# Patient Record
Sex: FEMALE | Race: BLACK OR AFRICAN AMERICAN | Employment: OTHER
[De-identification: names, ages, dates, MRNs, and addresses within clinical notes are randomized per-mention and may not be internally consistent; named-entity substitution may affect disease eponyms.]

---

## 2017-01-10 ENCOUNTER — TELEPHONE (OUTPATIENT)
Dept: FAMILY MEDICINE CLINIC | Facility: CLINIC | Age: 62
End: 2017-01-10

## 2017-01-30 ENCOUNTER — OFFICE VISIT (OUTPATIENT)
Dept: OBGYN | Facility: CLINIC | Age: 62
End: 2017-01-30

## 2017-01-30 VITALS
BODY MASS INDEX: 20.06 KG/M2 | HEIGHT: 59 IN | WEIGHT: 99.5 LBS | HEART RATE: 80 BPM | SYSTOLIC BLOOD PRESSURE: 126 MMHG | DIASTOLIC BLOOD PRESSURE: 79 MMHG | TEMPERATURE: 100 F

## 2017-01-30 DIAGNOSIS — B97.7 HPV (HUMAN PAPILLOMA VIRUS) INFECTION: Primary | ICD-10-CM

## 2017-01-30 PROCEDURE — 99213 OFFICE O/P EST LOW 20 MIN: CPT | Performed by: OBSTETRICS & GYNECOLOGY

## 2017-01-30 RX ORDER — LISINOPRIL 2.5 MG/1
2.5 TABLET ORAL DAILY
Status: ON HOLD | COMMUNITY
Start: 2017-01-17 | End: 2018-08-12 | Stop reason: HOSPADM

## 2017-01-30 RX ORDER — BENZONATATE 100 MG/1
CAPSULE ORAL
COMMUNITY
Start: 2017-01-17 | End: 2018-01-08

## 2017-01-30 RX ORDER — HYDROXYZINE HYDROCHLORIDE 25 MG/1
TABLET, FILM COATED ORAL
COMMUNITY
Start: 2017-01-10 | End: 2018-01-08

## 2017-01-30 RX ORDER — GABAPENTIN 300 MG/1
CAPSULE ORAL
COMMUNITY
Start: 2016-12-21 | End: 2018-01-08

## 2017-01-30 RX ORDER — OMEPRAZOLE 40 MG/1
CAPSULE, DELAYED RELEASE ORAL
COMMUNITY
Start: 2017-01-17 | End: 2018-01-08

## 2017-01-30 RX ORDER — BUPRENORPHINE HYDROCHLORIDE, NALOXONE HYDROCHLORIDE 8; 2 MG/1; MG/1
FILM, SOLUBLE BUCCAL; SUBLINGUAL
Status: ON HOLD | COMMUNITY
Start: 2017-01-19 | End: 2018-09-13 | Stop reason: HOSPADM

## 2017-01-30 RX ORDER — METOPROLOL SUCCINATE 25 MG/1
25 TABLET, EXTENDED RELEASE ORAL DAILY
Status: ON HOLD | COMMUNITY
Start: 2017-01-17 | End: 2018-08-12

## 2017-01-30 RX ORDER — BUPROPION HYDROCHLORIDE 150 MG/1
TABLET ORAL
COMMUNITY
Start: 2017-01-10 | End: 2018-01-08

## 2017-01-30 RX ORDER — AMLODIPINE BESYLATE 5 MG/1
5 TABLET ORAL DAILY
Status: ON HOLD | COMMUNITY
Start: 2017-01-17 | End: 2018-08-12

## 2017-01-30 RX ORDER — MIRTAZAPINE 30 MG/1
TABLET, FILM COATED ORAL
COMMUNITY
Start: 2017-01-10 | End: 2018-01-08

## 2017-01-30 RX ORDER — CLONAZEPAM 0.5 MG/1
TABLET ORAL
COMMUNITY
Start: 2016-11-15 | End: 2018-01-08

## 2017-02-18 ENCOUNTER — APPOINTMENT (OUTPATIENT)
Dept: CT IMAGING | Age: 62
End: 2017-02-18
Payer: COMMERCIAL

## 2017-02-18 ENCOUNTER — HOSPITAL ENCOUNTER (EMERGENCY)
Age: 62
Discharge: HOME | End: 2017-02-18
Attending: EMERGENCY MEDICINE
Payer: COMMERCIAL

## 2017-02-18 VITALS
TEMPERATURE: 98.2 F | HEART RATE: 80 BPM | SYSTOLIC BLOOD PRESSURE: 144 MMHG | RESPIRATION RATE: 18 BRPM | OXYGEN SATURATION: 98 % | DIASTOLIC BLOOD PRESSURE: 94 MMHG

## 2017-02-18 DIAGNOSIS — R42 DIZZY: Primary | ICD-10-CM

## 2017-02-18 LAB
ABSOLUTE EOS #: 0.1 K/UL (ref 0–0.4)
ABSOLUTE LYMPH #: 2.1 K/UL (ref 1–4.8)
ABSOLUTE MONO #: 0.6 K/UL (ref 0.1–1.2)
ANION GAP SERPL CALCULATED.3IONS-SCNC: 12 MMOL/L (ref 9–17)
BASOPHILS # BLD: 1 % (ref 0–2)
BASOPHILS ABSOLUTE: 0 K/UL (ref 0–0.2)
BUN BLDV-MCNC: 8 MG/DL (ref 8–23)
BUN/CREAT BLD: ABNORMAL (ref 9–20)
CALCIUM SERPL-MCNC: 9.6 MG/DL (ref 8.6–10.4)
CHLORIDE BLD-SCNC: 97 MMOL/L (ref 98–107)
CO2: 26 MMOL/L (ref 20–31)
CREAT SERPL-MCNC: 0.59 MG/DL (ref 0.5–0.9)
DIFFERENTIAL TYPE: NORMAL
EOSINOPHILS RELATIVE PERCENT: 1 % (ref 1–4)
GFR AFRICAN AMERICAN: >60 ML/MIN
GFR NON-AFRICAN AMERICAN: >60 ML/MIN
GFR SERPL CREATININE-BSD FRML MDRD: ABNORMAL ML/MIN/{1.73_M2}
GFR SERPL CREATININE-BSD FRML MDRD: ABNORMAL ML/MIN/{1.73_M2}
GLUCOSE BLD-MCNC: 94 MG/DL (ref 70–99)
HCT VFR BLD CALC: 46 % (ref 36–46)
HEMOGLOBIN: 15.5 G/DL (ref 12–16)
LYMPHOCYTES # BLD: 33 % (ref 24–44)
MCH RBC QN AUTO: 32 PG (ref 26–34)
MCHC RBC AUTO-ENTMCNC: 33.7 G/DL (ref 31–37)
MCV RBC AUTO: 95.2 FL (ref 80–100)
MONOCYTES # BLD: 9 % (ref 2–11)
PDW BLD-RTO: 13.8 % (ref 12.5–15.4)
PLATELET # BLD: 329 K/UL (ref 140–450)
PLATELET ESTIMATE: NORMAL
PMV BLD AUTO: 7.9 FL (ref 6–12)
POTASSIUM SERPL-SCNC: 4.6 MMOL/L (ref 3.7–5.3)
RBC # BLD: 4.84 M/UL (ref 4–5.2)
RBC # BLD: NORMAL 10*6/UL
SEG NEUTROPHILS: 56 % (ref 36–66)
SEGMENTED NEUTROPHILS ABSOLUTE COUNT: 3.7 K/UL (ref 1.8–7.7)
SODIUM BLD-SCNC: 135 MMOL/L (ref 135–144)
WBC # BLD: 6.5 K/UL (ref 3.5–11)
WBC # BLD: NORMAL 10*3/UL

## 2017-02-18 PROCEDURE — 85025 COMPLETE CBC W/AUTO DIFF WBC: CPT

## 2017-02-18 PROCEDURE — 70450 CT HEAD/BRAIN W/O DYE: CPT

## 2017-02-18 PROCEDURE — 80048 BASIC METABOLIC PNL TOTAL CA: CPT

## 2017-02-18 PROCEDURE — 6370000000 HC RX 637 (ALT 250 FOR IP): Performed by: EMERGENCY MEDICINE

## 2017-02-18 PROCEDURE — 96360 HYDRATION IV INFUSION INIT: CPT

## 2017-02-18 PROCEDURE — 70450 CT HEAD/BRAIN W/O DYE: CPT | Performed by: RADIOLOGY

## 2017-02-18 PROCEDURE — 2580000003 HC RX 258: Performed by: EMERGENCY MEDICINE

## 2017-02-18 PROCEDURE — 96361 HYDRATE IV INFUSION ADD-ON: CPT

## 2017-02-18 PROCEDURE — 99284 EMERGENCY DEPT VISIT MOD MDM: CPT

## 2017-02-18 RX ORDER — CETIRIZINE HYDROCHLORIDE 10 MG/1
10 TABLET ORAL DAILY
Qty: 30 TABLET | Refills: 0 | Status: SHIPPED | OUTPATIENT
Start: 2017-02-18 | End: 2018-01-08

## 2017-02-18 RX ORDER — MECLIZINE HCL 12.5 MG/1
25 TABLET ORAL ONCE
Status: COMPLETED | OUTPATIENT
Start: 2017-02-18 | End: 2017-02-18

## 2017-02-18 RX ORDER — ACETAMINOPHEN 325 MG/1
650 TABLET ORAL ONCE
Status: COMPLETED | OUTPATIENT
Start: 2017-02-18 | End: 2017-02-18

## 2017-02-18 RX ORDER — MECLIZINE HYDROCHLORIDE CHEWABLE TABLETS 25 MG/1
25 TABLET, CHEWABLE ORAL 3 TIMES DAILY PRN
Qty: 90 TABLET | Refills: 0 | Status: SHIPPED | OUTPATIENT
Start: 2017-02-18 | End: 2018-01-08

## 2017-02-18 RX ORDER — 0.9 % SODIUM CHLORIDE 0.9 %
1000 INTRAVENOUS SOLUTION INTRAVENOUS ONCE
Status: COMPLETED | OUTPATIENT
Start: 2017-02-18 | End: 2017-02-18

## 2017-02-18 RX ADMIN — SODIUM CHLORIDE 1000 ML: 9 INJECTION, SOLUTION INTRAVENOUS at 15:52

## 2017-02-18 RX ADMIN — ACETAMINOPHEN 650 MG: 325 TABLET ORAL at 14:16

## 2017-02-18 RX ADMIN — MECLIZINE HCL 25 MG: 12.5 TABLET ORAL at 14:16

## 2017-02-18 ASSESSMENT — PAIN DESCRIPTION - LOCATION: LOCATION: HEAD

## 2017-02-18 ASSESSMENT — PAIN SCALES - GENERAL
PAINLEVEL_OUTOF10: 4
PAINLEVEL_OUTOF10: 3

## 2017-02-18 ASSESSMENT — PAIN SCALES - WONG BAKER: WONGBAKER_NUMERICALRESPONSE: 4

## 2017-02-18 ASSESSMENT — PAIN DESCRIPTION - PAIN TYPE: TYPE: ACUTE PAIN

## 2017-02-18 ASSESSMENT — PAIN DESCRIPTION - DESCRIPTORS: DESCRIPTORS: ACHING

## 2017-03-27 ENCOUNTER — APPOINTMENT (OUTPATIENT)
Dept: CT IMAGING | Age: 62
DRG: 225 | End: 2017-03-27
Payer: COMMERCIAL

## 2017-03-27 ENCOUNTER — APPOINTMENT (OUTPATIENT)
Dept: GENERAL RADIOLOGY | Age: 62
DRG: 225 | End: 2017-03-27
Payer: COMMERCIAL

## 2017-03-27 ENCOUNTER — ANESTHESIA EVENT (OUTPATIENT)
Dept: OPERATING ROOM | Age: 62
DRG: 225 | End: 2017-03-27
Payer: COMMERCIAL

## 2017-03-27 ENCOUNTER — HOSPITAL ENCOUNTER (INPATIENT)
Age: 62
LOS: 4 days | Discharge: HOME | DRG: 225 | End: 2017-03-31
Attending: EMERGENCY MEDICINE | Admitting: SURGERY
Payer: COMMERCIAL

## 2017-03-27 ENCOUNTER — ANESTHESIA (OUTPATIENT)
Dept: OPERATING ROOM | Age: 62
DRG: 225 | End: 2017-03-27
Payer: COMMERCIAL

## 2017-03-27 VITALS — DIASTOLIC BLOOD PRESSURE: 75 MMHG | OXYGEN SATURATION: 100 % | TEMPERATURE: 98.7 F | SYSTOLIC BLOOD PRESSURE: 149 MMHG

## 2017-03-27 DIAGNOSIS — K35.30 ACUTE APPENDICITIS WITH LOCALIZED PERITONITIS: Primary | ICD-10-CM

## 2017-03-27 PROBLEM — K37 APPENDICITIS: Status: ACTIVE | Noted: 2017-03-27

## 2017-03-27 LAB
ABO/RH: NORMAL
ABSOLUTE EOS #: 0.2 K/UL (ref 0–0.4)
ABSOLUTE LYMPH #: 1.8 K/UL (ref 1–4.8)
ABSOLUTE MONO #: 0.9 K/UL (ref 0.1–1.2)
ALBUMIN SERPL-MCNC: 4.1 G/DL (ref 3.5–5.2)
ALBUMIN/GLOBULIN RATIO: 1.1 (ref 1–2.5)
ALP BLD-CCNC: 100 U/L (ref 35–104)
ALT SERPL-CCNC: 24 U/L (ref 5–33)
ANION GAP SERPL CALCULATED.3IONS-SCNC: 18 MMOL/L (ref 9–17)
ANTIBODY SCREEN: NEGATIVE
ARM BAND NUMBER: NORMAL
AST SERPL-CCNC: 30 U/L
BASOPHILS # BLD: 0 % (ref 0–2)
BASOPHILS ABSOLUTE: 0 K/UL (ref 0–0.2)
BILIRUB SERPL-MCNC: 0.64 MG/DL (ref 0.3–1.2)
BUN BLDV-MCNC: 12 MG/DL (ref 8–23)
BUN/CREAT BLD: ABNORMAL (ref 9–20)
CALCIUM SERPL-MCNC: 9.1 MG/DL (ref 8.6–10.4)
CHLORIDE BLD-SCNC: 101 MMOL/L (ref 98–107)
CO2: 21 MMOL/L (ref 20–31)
CREAT SERPL-MCNC: 0.53 MG/DL (ref 0.5–0.9)
DIFFERENTIAL TYPE: ABNORMAL
EOSINOPHILS RELATIVE PERCENT: 2 % (ref 1–4)
EXPIRATION DATE: NORMAL
GFR AFRICAN AMERICAN: >60 ML/MIN
GFR NON-AFRICAN AMERICAN: >60 ML/MIN
GFR SERPL CREATININE-BSD FRML MDRD: ABNORMAL ML/MIN/{1.73_M2}
GFR SERPL CREATININE-BSD FRML MDRD: ABNORMAL ML/MIN/{1.73_M2}
GLUCOSE BLD-MCNC: 102 MG/DL (ref 70–99)
HCT VFR BLD CALC: 44.8 % (ref 36–46)
HEMOGLOBIN: 14.6 G/DL (ref 12–16)
LIPASE: 32 U/L (ref 13–60)
LYMPHOCYTES # BLD: 18 % (ref 24–44)
MCH RBC QN AUTO: 31.6 PG (ref 26–34)
MCHC RBC AUTO-ENTMCNC: 32.7 G/DL (ref 31–37)
MCV RBC AUTO: 96.6 FL (ref 80–100)
MONOCYTES # BLD: 9 % (ref 2–11)
MORPHOLOGY: NORMAL
PDW BLD-RTO: 13.5 % (ref 12.5–15.4)
PLATELET # BLD: ABNORMAL K/UL (ref 140–450)
PLATELET ESTIMATE: ABNORMAL
PMV BLD AUTO: ABNORMAL FL (ref 6–12)
POC TROPONIN I: 0 NG/ML (ref 0–0.1)
POC TROPONIN INTERP: NORMAL
POTASSIUM SERPL-SCNC: 4.3 MMOL/L (ref 3.7–5.3)
RBC # BLD: 4.64 M/UL (ref 4–5.2)
RBC # BLD: ABNORMAL 10*6/UL
SEG NEUTROPHILS: 71 % (ref 36–66)
SEGMENTED NEUTROPHILS ABSOLUTE COUNT: 7.1 K/UL (ref 1.8–7.7)
SODIUM BLD-SCNC: 140 MMOL/L (ref 135–144)
TOTAL PROTEIN: 7.9 G/DL (ref 6.4–8.3)
TROPONIN INTERP: NORMAL
TROPONIN T: <0.03 NG/ML
WBC # BLD: 10 K/UL (ref 3.5–11)
WBC # BLD: ABNORMAL 10*3/UL

## 2017-03-27 PROCEDURE — 0DTJ4ZZ RESECTION OF APPENDIX, PERCUTANEOUS ENDOSCOPIC APPROACH: ICD-10-PCS | Performed by: SURGERY

## 2017-03-27 PROCEDURE — 2720000003 HC MISC SUTURE/STAPLES/RELOADS/ETC: Performed by: SURGERY

## 2017-03-27 PROCEDURE — 7100000001 HC PACU RECOVERY - ADDTL 15 MIN: Performed by: SURGERY

## 2017-03-27 PROCEDURE — 2500000003 HC RX 250 WO HCPCS: Performed by: EMERGENCY MEDICINE

## 2017-03-27 PROCEDURE — 6360000002 HC RX W HCPCS: Performed by: STUDENT IN AN ORGANIZED HEALTH CARE EDUCATION/TRAINING PROGRAM

## 2017-03-27 PROCEDURE — 2500000003 HC RX 250 WO HCPCS: Performed by: STUDENT IN AN ORGANIZED HEALTH CARE EDUCATION/TRAINING PROGRAM

## 2017-03-27 PROCEDURE — 86850 RBC ANTIBODY SCREEN: CPT

## 2017-03-27 PROCEDURE — 85025 COMPLETE CBC W/AUTO DIFF WBC: CPT

## 2017-03-27 PROCEDURE — S0028 INJECTION, FAMOTIDINE, 20 MG: HCPCS | Performed by: EMERGENCY MEDICINE

## 2017-03-27 PROCEDURE — 6370000000 HC RX 637 (ALT 250 FOR IP): Performed by: EMERGENCY MEDICINE

## 2017-03-27 PROCEDURE — 3600000014 HC SURGERY LEVEL 4 ADDTL 15MIN: Performed by: SURGERY

## 2017-03-27 PROCEDURE — 99285 EMERGENCY DEPT VISIT HI MDM: CPT

## 2017-03-27 PROCEDURE — 6360000004 HC RX CONTRAST MEDICATION: Performed by: EMERGENCY MEDICINE

## 2017-03-27 PROCEDURE — 84484 ASSAY OF TROPONIN QUANT: CPT

## 2017-03-27 PROCEDURE — 6360000002 HC RX W HCPCS: Performed by: EMERGENCY MEDICINE

## 2017-03-27 PROCEDURE — 3700000000 HC ANESTHESIA ATTENDED CARE: Performed by: SURGERY

## 2017-03-27 PROCEDURE — 2580000003 HC RX 258: Performed by: STUDENT IN AN ORGANIZED HEALTH CARE EDUCATION/TRAINING PROGRAM

## 2017-03-27 PROCEDURE — 74177 CT ABD & PELVIS W/CONTRAST: CPT

## 2017-03-27 PROCEDURE — 6360000002 HC RX W HCPCS: Performed by: ANESTHESIOLOGY

## 2017-03-27 PROCEDURE — 80053 COMPREHEN METABOLIC PANEL: CPT

## 2017-03-27 PROCEDURE — 3600000004 HC SURGERY LEVEL 4 BASE: Performed by: SURGERY

## 2017-03-27 PROCEDURE — 71020 XR CHEST STANDARD TWO VW: CPT

## 2017-03-27 PROCEDURE — 88304 TISSUE EXAM BY PATHOLOGIST: CPT

## 2017-03-27 PROCEDURE — 86900 BLOOD TYPING SEROLOGIC ABO: CPT

## 2017-03-27 PROCEDURE — 2500000003 HC RX 250 WO HCPCS: Performed by: SURGERY

## 2017-03-27 PROCEDURE — 2580000003 HC RX 258: Performed by: NURSE ANESTHETIST, CERTIFIED REGISTERED

## 2017-03-27 PROCEDURE — 2500000003 HC RX 250 WO HCPCS: Performed by: NURSE ANESTHETIST, CERTIFIED REGISTERED

## 2017-03-27 PROCEDURE — 2580000003 HC RX 258: Performed by: EMERGENCY MEDICINE

## 2017-03-27 PROCEDURE — 93005 ELECTROCARDIOGRAM TRACING: CPT

## 2017-03-27 PROCEDURE — 6360000002 HC RX W HCPCS: Performed by: NURSE ANESTHETIST, CERTIFIED REGISTERED

## 2017-03-27 PROCEDURE — 6370000000 HC RX 637 (ALT 250 FOR IP): Performed by: STUDENT IN AN ORGANIZED HEALTH CARE EDUCATION/TRAINING PROGRAM

## 2017-03-27 PROCEDURE — 83690 ASSAY OF LIPASE: CPT

## 2017-03-27 PROCEDURE — 3700000001 HC ADD 15 MINUTES (ANESTHESIA): Performed by: SURGERY

## 2017-03-27 PROCEDURE — 7100000000 HC PACU RECOVERY - FIRST 15 MIN: Performed by: SURGERY

## 2017-03-27 PROCEDURE — 1200000000 HC SEMI PRIVATE

## 2017-03-27 PROCEDURE — 86901 BLOOD TYPING SEROLOGIC RH(D): CPT

## 2017-03-27 RX ORDER — OXYCODONE HYDROCHLORIDE AND ACETAMINOPHEN 5; 325 MG/1; MG/1
1 TABLET ORAL EVERY 4 HOURS PRN
Status: DISCONTINUED | OUTPATIENT
Start: 2017-03-27 | End: 2017-03-27 | Stop reason: HOSPADM

## 2017-03-27 RX ORDER — FENTANYL CITRATE 50 UG/ML
25 INJECTION, SOLUTION INTRAMUSCULAR; INTRAVENOUS EVERY 5 MIN PRN
Status: DISCONTINUED | OUTPATIENT
Start: 2017-03-27 | End: 2017-03-27 | Stop reason: HOSPADM

## 2017-03-27 RX ORDER — SODIUM CHLORIDE 0.9 % (FLUSH) 0.9 %
10 SYRINGE (ML) INJECTION PRN
Status: DISCONTINUED | OUTPATIENT
Start: 2017-03-27 | End: 2017-03-31 | Stop reason: HOSPADM

## 2017-03-27 RX ORDER — ONDANSETRON 2 MG/ML
4 INJECTION INTRAMUSCULAR; INTRAVENOUS ONCE
Status: COMPLETED | OUTPATIENT
Start: 2017-03-27 | End: 2017-03-27

## 2017-03-27 RX ORDER — LABETALOL HYDROCHLORIDE 5 MG/ML
5 INJECTION, SOLUTION INTRAVENOUS EVERY 10 MIN PRN
Status: DISCONTINUED | OUTPATIENT
Start: 2017-03-27 | End: 2017-03-27 | Stop reason: HOSPADM

## 2017-03-27 RX ORDER — SODIUM CHLORIDE, SODIUM LACTATE, POTASSIUM CHLORIDE, CALCIUM CHLORIDE 600; 310; 30; 20 MG/100ML; MG/100ML; MG/100ML; MG/100ML
INJECTION, SOLUTION INTRAVENOUS CONTINUOUS PRN
Status: DISCONTINUED | OUTPATIENT
Start: 2017-03-27 | End: 2017-03-27 | Stop reason: SDUPTHER

## 2017-03-27 RX ORDER — MORPHINE SULFATE 4 MG/ML
4 INJECTION, SOLUTION INTRAMUSCULAR; INTRAVENOUS ONCE
Status: COMPLETED | OUTPATIENT
Start: 2017-03-27 | End: 2017-03-27

## 2017-03-27 RX ORDER — BUPIVACAINE HYDROCHLORIDE 2.5 MG/ML
INJECTION, SOLUTION INFILTRATION; PERINEURAL PRN
Status: DISCONTINUED | OUTPATIENT
Start: 2017-03-27 | End: 2017-03-27 | Stop reason: HOSPADM

## 2017-03-27 RX ORDER — HYDRALAZINE HYDROCHLORIDE 20 MG/ML
5 INJECTION INTRAMUSCULAR; INTRAVENOUS EVERY 10 MIN PRN
Status: DISCONTINUED | OUTPATIENT
Start: 2017-03-27 | End: 2017-03-27 | Stop reason: HOSPADM

## 2017-03-27 RX ORDER — MAGNESIUM HYDROXIDE/ALUMINUM HYDROXICE/SIMETHICONE 120; 1200; 1200 MG/30ML; MG/30ML; MG/30ML
30 SUSPENSION ORAL
Status: COMPLETED | OUTPATIENT
Start: 2017-03-27 | End: 2017-03-27

## 2017-03-27 RX ORDER — OXYCODONE HYDROCHLORIDE 5 MG/1
5 TABLET ORAL EVERY 4 HOURS PRN
Status: DISCONTINUED | OUTPATIENT
Start: 2017-03-27 | End: 2017-03-31 | Stop reason: HOSPADM

## 2017-03-27 RX ORDER — ONDANSETRON 2 MG/ML
4 INJECTION INTRAMUSCULAR; INTRAVENOUS
Status: DISCONTINUED | OUTPATIENT
Start: 2017-03-27 | End: 2017-03-27 | Stop reason: HOSPADM

## 2017-03-27 RX ORDER — ONDANSETRON 2 MG/ML
4 INJECTION INTRAMUSCULAR; INTRAVENOUS EVERY 6 HOURS PRN
Status: DISCONTINUED | OUTPATIENT
Start: 2017-03-27 | End: 2017-03-31 | Stop reason: HOSPADM

## 2017-03-27 RX ORDER — 0.9 % SODIUM CHLORIDE 0.9 %
1000 INTRAVENOUS SOLUTION INTRAVENOUS ONCE
Status: COMPLETED | OUTPATIENT
Start: 2017-03-27 | End: 2017-03-27

## 2017-03-27 RX ORDER — OXYCODONE HYDROCHLORIDE 5 MG/1
10 TABLET ORAL EVERY 4 HOURS PRN
Status: DISCONTINUED | OUTPATIENT
Start: 2017-03-27 | End: 2017-03-31 | Stop reason: HOSPADM

## 2017-03-27 RX ORDER — SODIUM CHLORIDE 0.9 % (FLUSH) 0.9 %
10 SYRINGE (ML) INJECTION EVERY 12 HOURS SCHEDULED
Status: DISCONTINUED | OUTPATIENT
Start: 2017-03-27 | End: 2017-03-31 | Stop reason: HOSPADM

## 2017-03-27 RX ORDER — FENTANYL CITRATE 50 UG/ML
50 INJECTION, SOLUTION INTRAMUSCULAR; INTRAVENOUS EVERY 5 MIN PRN
Status: DISCONTINUED | OUTPATIENT
Start: 2017-03-27 | End: 2017-03-27 | Stop reason: HOSPADM

## 2017-03-27 RX ORDER — PROPOFOL 10 MG/ML
INJECTION, EMULSION INTRAVENOUS PRN
Status: DISCONTINUED | OUTPATIENT
Start: 2017-03-27 | End: 2017-03-27 | Stop reason: SDUPTHER

## 2017-03-27 RX ORDER — ACETAMINOPHEN 325 MG/1
650 TABLET ORAL EVERY 4 HOURS PRN
Status: DISCONTINUED | OUTPATIENT
Start: 2017-03-27 | End: 2017-03-31 | Stop reason: HOSPADM

## 2017-03-27 RX ORDER — ROCURONIUM BROMIDE 10 MG/ML
INJECTION, SOLUTION INTRAVENOUS PRN
Status: DISCONTINUED | OUTPATIENT
Start: 2017-03-27 | End: 2017-03-27 | Stop reason: SDUPTHER

## 2017-03-27 RX ORDER — ONDANSETRON 2 MG/ML
INJECTION INTRAMUSCULAR; INTRAVENOUS PRN
Status: DISCONTINUED | OUTPATIENT
Start: 2017-03-27 | End: 2017-03-27 | Stop reason: SDUPTHER

## 2017-03-27 RX ORDER — LIDOCAINE HYDROCHLORIDE 10 MG/ML
INJECTION, SOLUTION INFILTRATION; PERINEURAL PRN
Status: DISCONTINUED | OUTPATIENT
Start: 2017-03-27 | End: 2017-03-27 | Stop reason: SDUPTHER

## 2017-03-27 RX ORDER — FENTANYL CITRATE 50 UG/ML
50 INJECTION, SOLUTION INTRAMUSCULAR; INTRAVENOUS
Status: DISCONTINUED | OUTPATIENT
Start: 2017-03-27 | End: 2017-03-31 | Stop reason: HOSPADM

## 2017-03-27 RX ORDER — FENTANYL CITRATE 50 UG/ML
INJECTION, SOLUTION INTRAMUSCULAR; INTRAVENOUS PRN
Status: DISCONTINUED | OUTPATIENT
Start: 2017-03-27 | End: 2017-03-27 | Stop reason: SDUPTHER

## 2017-03-27 RX ORDER — SODIUM CHLORIDE 9 MG/ML
INJECTION, SOLUTION INTRAVENOUS CONTINUOUS
Status: DISCONTINUED | OUTPATIENT
Start: 2017-03-27 | End: 2017-03-28

## 2017-03-27 RX ORDER — DIPHENHYDRAMINE HYDROCHLORIDE 50 MG/ML
12.5 INJECTION INTRAMUSCULAR; INTRAVENOUS
Status: DISCONTINUED | OUTPATIENT
Start: 2017-03-27 | End: 2017-03-27 | Stop reason: HOSPADM

## 2017-03-27 RX ORDER — GLYCOPYRROLATE 0.2 MG/ML
INJECTION INTRAMUSCULAR; INTRAVENOUS PRN
Status: DISCONTINUED | OUTPATIENT
Start: 2017-03-27 | End: 2017-03-27 | Stop reason: SDUPTHER

## 2017-03-27 RX ORDER — DEXAMETHASONE SODIUM PHOSPHATE 10 MG/ML
INJECTION INTRAMUSCULAR; INTRAVENOUS PRN
Status: DISCONTINUED | OUTPATIENT
Start: 2017-03-27 | End: 2017-03-27 | Stop reason: SDUPTHER

## 2017-03-27 RX ADMIN — NEOSTIGMINE METHYLSULFATE 3 MG: 1 INJECTION, SOLUTION INTRAMUSCULAR; INTRAVENOUS; SUBCUTANEOUS at 14:43

## 2017-03-27 RX ADMIN — MORPHINE SULFATE 4 MG: 4 INJECTION, SOLUTION INTRAMUSCULAR; INTRAVENOUS at 10:50

## 2017-03-27 RX ADMIN — FENTANYL CITRATE 50 MCG: 50 INJECTION, SOLUTION INTRAMUSCULAR; INTRAVENOUS at 16:15

## 2017-03-27 RX ADMIN — ROCURONIUM BROMIDE 40 MG: 10 INJECTION INTRAVENOUS at 13:35

## 2017-03-27 RX ADMIN — LIDOCAINE HYDROCHLORIDE 50 MG: 10 INJECTION, SOLUTION INFILTRATION; PERINEURAL at 13:35

## 2017-03-27 RX ADMIN — PROPOFOL 50 MG: 10 INJECTION, EMULSION INTRAVENOUS at 14:55

## 2017-03-27 RX ADMIN — SODIUM CHLORIDE: 9 INJECTION, SOLUTION INTRAVENOUS at 16:16

## 2017-03-27 RX ADMIN — FAMOTIDINE 20 MG: 10 INJECTION, SOLUTION INTRAVENOUS at 10:48

## 2017-03-27 RX ADMIN — SODIUM CHLORIDE 1000 ML: 9 INJECTION, SOLUTION INTRAVENOUS at 10:46

## 2017-03-27 RX ADMIN — LIDOCAINE HYDROCHLORIDE 15 ML: 20 SOLUTION ORAL; TOPICAL at 10:13

## 2017-03-27 RX ADMIN — HYDROMORPHONE HYDROCHLORIDE 0.5 MG: 1 INJECTION, SOLUTION INTRAMUSCULAR; INTRAVENOUS; SUBCUTANEOUS at 15:25

## 2017-03-27 RX ADMIN — ONDANSETRON 4 MG: 2 INJECTION, SOLUTION INTRAMUSCULAR; INTRAVENOUS at 14:42

## 2017-03-27 RX ADMIN — FENTANYL CITRATE 50 MCG: 50 INJECTION, SOLUTION INTRAMUSCULAR; INTRAVENOUS at 20:16

## 2017-03-27 RX ADMIN — IOHEXOL 130 ML: 350 INJECTION, SOLUTION INTRAVENOUS at 11:28

## 2017-03-27 RX ADMIN — FENTANYL CITRATE 50 MCG: 50 INJECTION, SOLUTION INTRAMUSCULAR; INTRAVENOUS at 18:13

## 2017-03-27 RX ADMIN — FENTANYL CITRATE 50 MCG: 50 INJECTION INTRAMUSCULAR; INTRAVENOUS at 15:03

## 2017-03-27 RX ADMIN — DEXTROSE MONOHYDRATE 900 MG: 50 INJECTION, SOLUTION INTRAVENOUS at 13:49

## 2017-03-27 RX ADMIN — HYDROMORPHONE HYDROCHLORIDE 0.5 MG: 1 INJECTION, SOLUTION INTRAMUSCULAR; INTRAVENOUS; SUBCUTANEOUS at 15:20

## 2017-03-27 RX ADMIN — DEXAMETHASONE SODIUM PHOSPHATE 10 MG: 10 INJECTION INTRAMUSCULAR; INTRAVENOUS at 13:45

## 2017-03-27 RX ADMIN — MORPHINE SULFATE 4 MG: 4 INJECTION, SOLUTION INTRAMUSCULAR; INTRAVENOUS at 12:53

## 2017-03-27 RX ADMIN — ONDANSETRON 4 MG: 2 INJECTION, SOLUTION INTRAMUSCULAR; INTRAVENOUS at 10:48

## 2017-03-27 RX ADMIN — PROPOFOL 150 MG: 10 INJECTION, EMULSION INTRAVENOUS at 13:35

## 2017-03-27 RX ADMIN — GLYCOPYRROLATE 0.6 MG: 0.2 INJECTION, SOLUTION INTRAMUSCULAR; INTRAVENOUS at 14:43

## 2017-03-27 RX ADMIN — FENTANYL CITRATE 100 MCG: 50 INJECTION INTRAMUSCULAR; INTRAVENOUS at 13:35

## 2017-03-27 RX ADMIN — OXYCODONE HYDROCHLORIDE 10 MG: 5 TABLET ORAL at 22:36

## 2017-03-27 RX ADMIN — SODIUM CHLORIDE, POTASSIUM CHLORIDE, SODIUM LACTATE AND CALCIUM CHLORIDE: 600; 310; 30; 20 INJECTION, SOLUTION INTRAVENOUS at 13:29

## 2017-03-27 RX ADMIN — FENTANYL CITRATE 50 MCG: 50 INJECTION, SOLUTION INTRAMUSCULAR; INTRAVENOUS at 22:36

## 2017-03-27 RX ADMIN — SODIUM CHLORIDE 1.5 G: 900 INJECTION INTRAVENOUS at 12:25

## 2017-03-27 RX ADMIN — ACETAMINOPHEN 650 MG: 325 TABLET ORAL at 22:32

## 2017-03-27 RX ADMIN — PHENYLEPHRINE HYDROCHLORIDE 100 MCG: 10 INJECTION INTRAMUSCULAR; INTRAVENOUS; SUBCUTANEOUS at 14:18

## 2017-03-27 RX ADMIN — ALUMINUM HYDROXIDE, MAGNESIUM HYDROXIDE, AND SIMETHICONE 30 ML: 200; 200; 20 SUSPENSION ORAL at 10:13

## 2017-03-27 ASSESSMENT — PAIN SCALES - GENERAL
PAINLEVEL_OUTOF10: 10
PAINLEVEL_OUTOF10: 8
PAINLEVEL_OUTOF10: 10
PAINLEVEL_OUTOF10: 9
PAINLEVEL_OUTOF10: 10
PAINLEVEL_OUTOF10: 8
PAINLEVEL_OUTOF10: 10

## 2017-03-27 ASSESSMENT — ENCOUNTER SYMPTOMS
RHINORRHEA: 0
APNEA: 0
STRIDOR: 0
EYE PAIN: 0
CHOKING: 0
TROUBLE SWALLOWING: 0
WHEEZING: 0
BLOOD IN STOOL: 0
EYE DISCHARGE: 0
COLOR CHANGE: 0
VOMITING: 1
SORE THROAT: 0
EYE REDNESS: 0
NAUSEA: 1
COUGH: 0
DIARRHEA: 0
SHORTNESS OF BREATH: 0
VOICE CHANGE: 0
BACK PAIN: 0
ABDOMINAL PAIN: 1
ABDOMINAL DISTENTION: 0
CONSTIPATION: 0
PHOTOPHOBIA: 0
CHEST TIGHTNESS: 0

## 2017-03-27 ASSESSMENT — PAIN SCALES - WONG BAKER
WONGBAKER_NUMERICALRESPONSE: 0

## 2017-03-27 ASSESSMENT — PAIN DESCRIPTION - FREQUENCY
FREQUENCY: CONTINUOUS
FREQUENCY: CONTINUOUS

## 2017-03-27 ASSESSMENT — PAIN DESCRIPTION - PROGRESSION
CLINICAL_PROGRESSION: GRADUALLY WORSENING
CLINICAL_PROGRESSION: NOT CHANGED

## 2017-03-27 ASSESSMENT — PAIN DESCRIPTION - LOCATION
LOCATION: ABDOMEN
LOCATION: ABDOMEN

## 2017-03-27 ASSESSMENT — PAIN DESCRIPTION - ONSET
ONSET: ON-GOING
ONSET: AWAKENED FROM SLEEP

## 2017-03-27 ASSESSMENT — ACTIVITIES OF DAILY LIVING (ADL): EFFECT OF PAIN ON DAILY ACTIVITIES: DECREASED MOBILITY

## 2017-03-27 ASSESSMENT — PAIN DESCRIPTION - PAIN TYPE
TYPE: ACUTE PAIN
TYPE: ACUTE PAIN;SURGICAL PAIN

## 2017-03-27 ASSESSMENT — PAIN DESCRIPTION - ORIENTATION: ORIENTATION: LEFT;UPPER

## 2017-03-28 LAB
ANION GAP SERPL CALCULATED.3IONS-SCNC: 15 MMOL/L (ref 9–17)
BUN BLDV-MCNC: 10 MG/DL (ref 8–23)
BUN/CREAT BLD: ABNORMAL (ref 9–20)
CALCIUM SERPL-MCNC: 8.6 MG/DL (ref 8.6–10.4)
CHLORIDE BLD-SCNC: 99 MMOL/L (ref 98–107)
CO2: 22 MMOL/L (ref 20–31)
CREAT SERPL-MCNC: 0.68 MG/DL (ref 0.5–0.9)
GFR AFRICAN AMERICAN: >60 ML/MIN
GFR NON-AFRICAN AMERICAN: >60 ML/MIN
GFR SERPL CREATININE-BSD FRML MDRD: ABNORMAL ML/MIN/{1.73_M2}
GFR SERPL CREATININE-BSD FRML MDRD: ABNORMAL ML/MIN/{1.73_M2}
GLUCOSE BLD-MCNC: 136 MG/DL (ref 70–99)
POTASSIUM SERPL-SCNC: 3.9 MMOL/L (ref 3.7–5.3)
SODIUM BLD-SCNC: 136 MMOL/L (ref 135–144)

## 2017-03-28 PROCEDURE — 6370000000 HC RX 637 (ALT 250 FOR IP): Performed by: STUDENT IN AN ORGANIZED HEALTH CARE EDUCATION/TRAINING PROGRAM

## 2017-03-28 PROCEDURE — 2500000003 HC RX 250 WO HCPCS: Performed by: STUDENT IN AN ORGANIZED HEALTH CARE EDUCATION/TRAINING PROGRAM

## 2017-03-28 PROCEDURE — 6360000002 HC RX W HCPCS: Performed by: EMERGENCY MEDICINE

## 2017-03-28 PROCEDURE — 2580000003 HC RX 258: Performed by: STUDENT IN AN ORGANIZED HEALTH CARE EDUCATION/TRAINING PROGRAM

## 2017-03-28 PROCEDURE — 80048 BASIC METABOLIC PNL TOTAL CA: CPT

## 2017-03-28 PROCEDURE — 36415 COLL VENOUS BLD VENIPUNCTURE: CPT

## 2017-03-28 PROCEDURE — 1200000000 HC SEMI PRIVATE

## 2017-03-28 PROCEDURE — 6360000002 HC RX W HCPCS: Performed by: STUDENT IN AN ORGANIZED HEALTH CARE EDUCATION/TRAINING PROGRAM

## 2017-03-28 RX ORDER — CLINDAMYCIN HYDROCHLORIDE 150 MG/1
300 CAPSULE ORAL EVERY 8 HOURS SCHEDULED
Status: COMPLETED | OUTPATIENT
Start: 2017-03-28 | End: 2017-03-29

## 2017-03-28 RX ORDER — DIPHENHYDRAMINE HYDROCHLORIDE 50 MG/ML
25 INJECTION INTRAMUSCULAR; INTRAVENOUS ONCE
Status: COMPLETED | OUTPATIENT
Start: 2017-03-28 | End: 2017-03-28

## 2017-03-28 RX ADMIN — Medication 10 ML: at 20:44

## 2017-03-28 RX ADMIN — OXYCODONE HYDROCHLORIDE 10 MG: 5 TABLET ORAL at 16:43

## 2017-03-28 RX ADMIN — OXYCODONE HYDROCHLORIDE 10 MG: 5 TABLET ORAL at 20:44

## 2017-03-28 RX ADMIN — ENOXAPARIN SODIUM 40 MG: 40 INJECTION SUBCUTANEOUS at 09:07

## 2017-03-28 RX ADMIN — OXYCODONE HYDROCHLORIDE 10 MG: 5 TABLET ORAL at 02:52

## 2017-03-28 RX ADMIN — DEXTROSE MONOHYDRATE 900 MG: 50 INJECTION, SOLUTION INTRAVENOUS at 00:38

## 2017-03-28 RX ADMIN — ACETAMINOPHEN 650 MG: 325 TABLET ORAL at 03:04

## 2017-03-28 RX ADMIN — OXYCODONE HYDROCHLORIDE 10 MG: 5 TABLET ORAL at 06:51

## 2017-03-28 RX ADMIN — CLINDAMYCIN HYDROCHLORIDE 300 MG: 150 CAPSULE ORAL at 22:13

## 2017-03-28 RX ADMIN — DEXTROSE MONOHYDRATE 900 MG: 50 INJECTION, SOLUTION INTRAVENOUS at 09:07

## 2017-03-28 RX ADMIN — SODIUM CHLORIDE: 9 INJECTION, SOLUTION INTRAVENOUS at 03:00

## 2017-03-28 RX ADMIN — DIPHENHYDRAMINE HYDROCHLORIDE 25 MG: 50 INJECTION, SOLUTION INTRAMUSCULAR; INTRAVENOUS at 22:49

## 2017-03-28 RX ADMIN — FENTANYL CITRATE 50 MCG: 50 INJECTION, SOLUTION INTRAMUSCULAR; INTRAVENOUS at 05:09

## 2017-03-28 RX ADMIN — OXYCODONE HYDROCHLORIDE 10 MG: 5 TABLET ORAL at 12:06

## 2017-03-28 RX ADMIN — CLINDAMYCIN HYDROCHLORIDE 300 MG: 150 CAPSULE ORAL at 16:43

## 2017-03-28 ASSESSMENT — PAIN SCALES - GENERAL
PAINLEVEL_OUTOF10: 2
PAINLEVEL_OUTOF10: 10
PAINLEVEL_OUTOF10: 4
PAINLEVEL_OUTOF10: 7
PAINLEVEL_OUTOF10: 7
PAINLEVEL_OUTOF10: 8
PAINLEVEL_OUTOF10: 9
PAINLEVEL_OUTOF10: 10
PAINLEVEL_OUTOF10: 3
PAINLEVEL_OUTOF10: 6
PAINLEVEL_OUTOF10: 3

## 2017-03-28 ASSESSMENT — PAIN DESCRIPTION - PAIN TYPE
TYPE: ACUTE PAIN;SURGICAL PAIN

## 2017-03-28 ASSESSMENT — PAIN DESCRIPTION - DESCRIPTORS
DESCRIPTORS: ACHING;CONSTANT;DISCOMFORT

## 2017-03-28 ASSESSMENT — PAIN DESCRIPTION - LOCATION
LOCATION: ABDOMEN

## 2017-03-29 LAB
ABSOLUTE EOS #: 0 K/UL (ref 0–0.4)
ABSOLUTE LYMPH #: 1.89 K/UL (ref 1–4.8)
ABSOLUTE MONO #: 0.84 K/UL (ref 0.1–0.8)
BASOPHILS # BLD: 0 % (ref 0–2)
BASOPHILS ABSOLUTE: 0 K/UL (ref 0–0.2)
DIFFERENTIAL TYPE: ABNORMAL
EOSINOPHILS RELATIVE PERCENT: 0 % (ref 1–4)
HCT VFR BLD CALC: 37.5 % (ref 36–46)
HEMOGLOBIN: 12.9 G/DL (ref 12–16)
LYMPHOCYTES # BLD: 9 % (ref 24–44)
MCH RBC QN AUTO: 32.1 PG (ref 26–34)
MCHC RBC AUTO-ENTMCNC: 34.4 G/DL (ref 31–37)
MCV RBC AUTO: 93.5 FL (ref 80–100)
MONOCYTES # BLD: 4 % (ref 1–7)
MORPHOLOGY: ABNORMAL
PDW BLD-RTO: 13.6 % (ref 12.5–15.4)
PLATELET # BLD: 262 K/UL (ref 140–450)
PLATELET ESTIMATE: ABNORMAL
PMV BLD AUTO: 8.6 FL (ref 6–12)
RBC # BLD: 4.01 M/UL (ref 4–5.2)
RBC # BLD: ABNORMAL 10*6/UL
SEG NEUTROPHILS: 87 % (ref 36–66)
SEGMENTED NEUTROPHILS ABSOLUTE COUNT: 18.27 K/UL (ref 1.8–7.7)
SURGICAL PATHOLOGY REPORT: NORMAL
WBC # BLD: 21 K/UL (ref 3.5–11)
WBC # BLD: ABNORMAL 10*3/UL

## 2017-03-29 PROCEDURE — 6370000000 HC RX 637 (ALT 250 FOR IP): Performed by: EMERGENCY MEDICINE

## 2017-03-29 PROCEDURE — 6360000002 HC RX W HCPCS: Performed by: STUDENT IN AN ORGANIZED HEALTH CARE EDUCATION/TRAINING PROGRAM

## 2017-03-29 PROCEDURE — 85025 COMPLETE CBC W/AUTO DIFF WBC: CPT

## 2017-03-29 PROCEDURE — 36415 COLL VENOUS BLD VENIPUNCTURE: CPT

## 2017-03-29 PROCEDURE — 6370000000 HC RX 637 (ALT 250 FOR IP): Performed by: STUDENT IN AN ORGANIZED HEALTH CARE EDUCATION/TRAINING PROGRAM

## 2017-03-29 PROCEDURE — 1200000000 HC SEMI PRIVATE

## 2017-03-29 PROCEDURE — 6360000002 HC RX W HCPCS: Performed by: EMERGENCY MEDICINE

## 2017-03-29 PROCEDURE — 2580000003 HC RX 258: Performed by: STUDENT IN AN ORGANIZED HEALTH CARE EDUCATION/TRAINING PROGRAM

## 2017-03-29 RX ORDER — CIPROFLOXACIN 500 MG/1
500 TABLET, FILM COATED ORAL EVERY 12 HOURS SCHEDULED
Qty: 20 TABLET | Refills: 0 | Status: SHIPPED | OUTPATIENT
Start: 2017-03-29 | End: 2017-03-31

## 2017-03-29 RX ORDER — OXYCODONE HYDROCHLORIDE 5 MG/1
TABLET ORAL
Qty: 30 TABLET | Refills: 0 | Status: SHIPPED | OUTPATIENT
Start: 2017-03-29 | End: 2017-03-31

## 2017-03-29 RX ORDER — METRONIDAZOLE 500 MG/1
500 TABLET ORAL EVERY 8 HOURS SCHEDULED
Qty: 30 TABLET | Refills: 0 | Status: SHIPPED | OUTPATIENT
Start: 2017-03-29 | End: 2017-03-31

## 2017-03-29 RX ORDER — FAMOTIDINE 20 MG/1
20 TABLET, FILM COATED ORAL 2 TIMES DAILY
Status: DISCONTINUED | OUTPATIENT
Start: 2017-03-29 | End: 2017-03-30

## 2017-03-29 RX ORDER — CIPROFLOXACIN 500 MG/1
500 TABLET, FILM COATED ORAL EVERY 12 HOURS SCHEDULED
Status: DISCONTINUED | OUTPATIENT
Start: 2017-03-29 | End: 2017-03-31 | Stop reason: HOSPADM

## 2017-03-29 RX ORDER — DOCUSATE SODIUM 100 MG/1
100 CAPSULE, LIQUID FILLED ORAL 2 TIMES DAILY PRN
Qty: 60 CAPSULE | Refills: 0 | Status: SHIPPED | OUTPATIENT
Start: 2017-03-29 | End: 2018-01-08

## 2017-03-29 RX ORDER — METRONIDAZOLE 500 MG/1
500 TABLET ORAL EVERY 8 HOURS SCHEDULED
Status: DISCONTINUED | OUTPATIENT
Start: 2017-03-29 | End: 2017-03-31 | Stop reason: HOSPADM

## 2017-03-29 RX ORDER — LORAZEPAM 2 MG/ML
1 INJECTION INTRAMUSCULAR ONCE
Status: COMPLETED | OUTPATIENT
Start: 2017-03-29 | End: 2017-03-29

## 2017-03-29 RX ADMIN — ONDANSETRON 4 MG: 2 INJECTION, SOLUTION INTRAMUSCULAR; INTRAVENOUS at 21:31

## 2017-03-29 RX ADMIN — CIPROFLOXACIN 500 MG: 500 TABLET, FILM COATED ORAL at 09:03

## 2017-03-29 RX ADMIN — LORAZEPAM 1 MG: 2 INJECTION INTRAMUSCULAR; INTRAVENOUS at 22:17

## 2017-03-29 RX ADMIN — OXYCODONE HYDROCHLORIDE 10 MG: 5 TABLET ORAL at 21:23

## 2017-03-29 RX ADMIN — FAMOTIDINE 20 MG: 20 TABLET, FILM COATED ORAL at 22:17

## 2017-03-29 RX ADMIN — ACETAMINOPHEN 650 MG: 325 TABLET ORAL at 02:32

## 2017-03-29 RX ADMIN — ONDANSETRON 4 MG: 2 INJECTION, SOLUTION INTRAMUSCULAR; INTRAVENOUS at 02:32

## 2017-03-29 RX ADMIN — OXYCODONE HYDROCHLORIDE 10 MG: 5 TABLET ORAL at 16:47

## 2017-03-29 RX ADMIN — OXYCODONE HYDROCHLORIDE 10 MG: 5 TABLET ORAL at 00:09

## 2017-03-29 RX ADMIN — OXYCODONE HYDROCHLORIDE 10 MG: 5 TABLET ORAL at 08:24

## 2017-03-29 RX ADMIN — OXYCODONE HYDROCHLORIDE 10 MG: 5 TABLET ORAL at 04:27

## 2017-03-29 RX ADMIN — METRONIDAZOLE 500 MG: 500 TABLET, FILM COATED ORAL at 22:17

## 2017-03-29 RX ADMIN — CLINDAMYCIN HYDROCHLORIDE 300 MG: 150 CAPSULE ORAL at 06:17

## 2017-03-29 RX ADMIN — OXYCODONE HYDROCHLORIDE 10 MG: 5 TABLET ORAL at 13:11

## 2017-03-29 RX ADMIN — METRONIDAZOLE 500 MG: 500 TABLET, FILM COATED ORAL at 09:03

## 2017-03-29 RX ADMIN — METRONIDAZOLE 500 MG: 500 TABLET, FILM COATED ORAL at 16:47

## 2017-03-29 RX ADMIN — CIPROFLOXACIN 500 MG: 500 TABLET, FILM COATED ORAL at 21:24

## 2017-03-29 RX ADMIN — Medication 10 ML: at 21:38

## 2017-03-29 ASSESSMENT — PAIN SCALES - GENERAL
PAINLEVEL_OUTOF10: 3
PAINLEVEL_OUTOF10: 8
PAINLEVEL_OUTOF10: 8
PAINLEVEL_OUTOF10: 7
PAINLEVEL_OUTOF10: 4
PAINLEVEL_OUTOF10: 7
PAINLEVEL_OUTOF10: 7
PAINLEVEL_OUTOF10: 8
PAINLEVEL_OUTOF10: 2
PAINLEVEL_OUTOF10: 4
PAINLEVEL_OUTOF10: 10

## 2017-03-29 ASSESSMENT — PAIN DESCRIPTION - FREQUENCY: FREQUENCY: CONTINUOUS

## 2017-03-29 ASSESSMENT — PAIN DESCRIPTION - LOCATION: LOCATION: ABDOMEN

## 2017-03-29 ASSESSMENT — PAIN DESCRIPTION - ORIENTATION: ORIENTATION: MID

## 2017-03-29 ASSESSMENT — PAIN DESCRIPTION - DESCRIPTORS: DESCRIPTORS: ACHING;DULL

## 2017-03-29 ASSESSMENT — PAIN DESCRIPTION - PROGRESSION: CLINICAL_PROGRESSION: NOT CHANGED

## 2017-03-29 ASSESSMENT — PAIN DESCRIPTION - PAIN TYPE: TYPE: ACUTE PAIN;SURGICAL PAIN

## 2017-03-30 ENCOUNTER — APPOINTMENT (OUTPATIENT)
Dept: GENERAL RADIOLOGY | Age: 62
DRG: 225 | End: 2017-03-30
Payer: COMMERCIAL

## 2017-03-30 LAB
ABSOLUTE EOS #: 0 K/UL (ref 0–0.4)
ABSOLUTE LYMPH #: 1.14 K/UL (ref 1–4.8)
ABSOLUTE MONO #: 0.91 K/UL (ref 0.1–0.8)
ANION GAP SERPL CALCULATED.3IONS-SCNC: 22 MMOL/L (ref 9–17)
BASOPHILS # BLD: 0 % (ref 0–2)
BASOPHILS ABSOLUTE: 0 K/UL (ref 0–0.2)
BUN BLDV-MCNC: 13 MG/DL (ref 8–23)
BUN/CREAT BLD: ABNORMAL (ref 9–20)
CALCIUM SERPL-MCNC: 9.3 MG/DL (ref 8.6–10.4)
CHLORIDE BLD-SCNC: 95 MMOL/L (ref 98–107)
CO2: 18 MMOL/L (ref 20–31)
CREAT SERPL-MCNC: 0.63 MG/DL (ref 0.5–0.9)
DIFFERENTIAL TYPE: ABNORMAL
EKG ATRIAL RATE: 128 BPM
EKG P AXIS: 67 DEGREES
EKG P-R INTERVAL: 152 MS
EKG Q-T INTERVAL: 306 MS
EKG QRS DURATION: 68 MS
EKG QTC CALCULATION (BAZETT): 446 MS
EKG R AXIS: 60 DEGREES
EKG T AXIS: 71 DEGREES
EKG VENTRICULAR RATE: 128 BPM
EOSINOPHILS RELATIVE PERCENT: 0 % (ref 1–4)
GFR AFRICAN AMERICAN: >60 ML/MIN
GFR NON-AFRICAN AMERICAN: >60 ML/MIN
GFR SERPL CREATININE-BSD FRML MDRD: ABNORMAL ML/MIN/{1.73_M2}
GFR SERPL CREATININE-BSD FRML MDRD: ABNORMAL ML/MIN/{1.73_M2}
GLUCOSE BLD-MCNC: 88 MG/DL (ref 70–99)
HCT VFR BLD CALC: 38 % (ref 36–46)
HEMOGLOBIN: 12.6 G/DL (ref 12–16)
LYMPHOCYTES # BLD: 5 % (ref 24–44)
MCH RBC QN AUTO: 31.4 PG (ref 26–34)
MCHC RBC AUTO-ENTMCNC: 33.2 G/DL (ref 31–37)
MCV RBC AUTO: 94.6 FL (ref 80–100)
MONOCYTES # BLD: 4 % (ref 1–7)
MORPHOLOGY: ABNORMAL
PDW BLD-RTO: 13.8 % (ref 12.5–15.4)
PLATELET # BLD: 302 K/UL (ref 140–450)
PLATELET ESTIMATE: ABNORMAL
PMV BLD AUTO: 8.3 FL (ref 6–12)
POTASSIUM SERPL-SCNC: 4.6 MMOL/L (ref 3.7–5.3)
RBC # BLD: 4.02 M/UL (ref 4–5.2)
RBC # BLD: ABNORMAL 10*6/UL
SEG NEUTROPHILS: 91 % (ref 36–66)
SEGMENTED NEUTROPHILS ABSOLUTE COUNT: 20.75 K/UL (ref 1.8–7.7)
SODIUM BLD-SCNC: 135 MMOL/L (ref 135–144)
TROPONIN INTERP: NORMAL
TROPONIN T: <0.03 NG/ML
WBC # BLD: 22.8 K/UL (ref 3.5–11)
WBC # BLD: ABNORMAL 10*3/UL

## 2017-03-30 PROCEDURE — 6360000002 HC RX W HCPCS: Performed by: EMERGENCY MEDICINE

## 2017-03-30 PROCEDURE — 85025 COMPLETE CBC W/AUTO DIFF WBC: CPT

## 2017-03-30 PROCEDURE — C9113 INJ PANTOPRAZOLE SODIUM, VIA: HCPCS | Performed by: EMERGENCY MEDICINE

## 2017-03-30 PROCEDURE — 2580000003 HC RX 258: Performed by: EMERGENCY MEDICINE

## 2017-03-30 PROCEDURE — 6370000000 HC RX 637 (ALT 250 FOR IP): Performed by: STUDENT IN AN ORGANIZED HEALTH CARE EDUCATION/TRAINING PROGRAM

## 2017-03-30 PROCEDURE — 93005 ELECTROCARDIOGRAM TRACING: CPT

## 2017-03-30 PROCEDURE — 36415 COLL VENOUS BLD VENIPUNCTURE: CPT

## 2017-03-30 PROCEDURE — 1200000000 HC SEMI PRIVATE

## 2017-03-30 PROCEDURE — 6370000000 HC RX 637 (ALT 250 FOR IP): Performed by: EMERGENCY MEDICINE

## 2017-03-30 PROCEDURE — 6360000002 HC RX W HCPCS: Performed by: STUDENT IN AN ORGANIZED HEALTH CARE EDUCATION/TRAINING PROGRAM

## 2017-03-30 PROCEDURE — 2500000003 HC RX 250 WO HCPCS: Performed by: STUDENT IN AN ORGANIZED HEALTH CARE EDUCATION/TRAINING PROGRAM

## 2017-03-30 PROCEDURE — 2580000003 HC RX 258: Performed by: STUDENT IN AN ORGANIZED HEALTH CARE EDUCATION/TRAINING PROGRAM

## 2017-03-30 PROCEDURE — 36416 COLLJ CAPILLARY BLOOD SPEC: CPT

## 2017-03-30 PROCEDURE — 84484 ASSAY OF TROPONIN QUANT: CPT

## 2017-03-30 PROCEDURE — 80048 BASIC METABOLIC PNL TOTAL CA: CPT

## 2017-03-30 PROCEDURE — 71010 XR CHEST PORTABLE: CPT

## 2017-03-30 RX ORDER — DOCUSATE SODIUM 100 MG/1
100 CAPSULE, LIQUID FILLED ORAL 2 TIMES DAILY
Status: DISCONTINUED | OUTPATIENT
Start: 2017-03-30 | End: 2017-03-31 | Stop reason: HOSPADM

## 2017-03-30 RX ORDER — LISINOPRIL 2.5 MG/1
2.5 TABLET ORAL DAILY
Status: DISCONTINUED | OUTPATIENT
Start: 2017-03-30 | End: 2017-03-31 | Stop reason: HOSPADM

## 2017-03-30 RX ORDER — PANTOPRAZOLE SODIUM 40 MG/1
40 TABLET, DELAYED RELEASE ORAL
Status: DISCONTINUED | OUTPATIENT
Start: 2017-03-30 | End: 2017-03-31 | Stop reason: HOSPADM

## 2017-03-30 RX ORDER — CLONAZEPAM 0.5 MG/1
0.5 TABLET ORAL DAILY
Status: DISCONTINUED | OUTPATIENT
Start: 2017-03-30 | End: 2017-03-31 | Stop reason: HOSPADM

## 2017-03-30 RX ORDER — PANTOPRAZOLE SODIUM 20 MG/1
20 TABLET, DELAYED RELEASE ORAL ONCE
Status: DISCONTINUED | OUTPATIENT
Start: 2017-03-30 | End: 2017-03-30

## 2017-03-30 RX ORDER — METOPROLOL SUCCINATE 25 MG/1
25 TABLET, EXTENDED RELEASE ORAL DAILY
Status: DISCONTINUED | OUTPATIENT
Start: 2017-03-30 | End: 2017-03-31 | Stop reason: HOSPADM

## 2017-03-30 RX ORDER — AMLODIPINE BESYLATE 5 MG/1
5 TABLET ORAL NIGHTLY
Status: DISCONTINUED | OUTPATIENT
Start: 2017-03-30 | End: 2017-03-31 | Stop reason: HOSPADM

## 2017-03-30 RX ORDER — MIRTAZAPINE 30 MG/1
30 TABLET, FILM COATED ORAL NIGHTLY
Status: DISCONTINUED | OUTPATIENT
Start: 2017-03-30 | End: 2017-03-31 | Stop reason: HOSPADM

## 2017-03-30 RX ORDER — POLYETHYLENE GLYCOL 3350 17 G/17G
17 POWDER, FOR SOLUTION ORAL DAILY
Status: DISCONTINUED | OUTPATIENT
Start: 2017-03-30 | End: 2017-03-31 | Stop reason: HOSPADM

## 2017-03-30 RX ORDER — BUPROPION HYDROCHLORIDE 150 MG/1
150 TABLET ORAL DAILY
Status: DISCONTINUED | OUTPATIENT
Start: 2017-03-30 | End: 2017-03-31 | Stop reason: HOSPADM

## 2017-03-30 RX ADMIN — Medication 10 ML: at 21:07

## 2017-03-30 RX ADMIN — Medication 10 ML: at 09:00

## 2017-03-30 RX ADMIN — OXYCODONE HYDROCHLORIDE 5 MG: 5 TABLET ORAL at 10:53

## 2017-03-30 RX ADMIN — DOCUSATE SODIUM 100 MG: 100 CAPSULE, LIQUID FILLED ORAL at 21:06

## 2017-03-30 RX ADMIN — METOPROLOL TARTRATE 5 MG: 5 INJECTION INTRAVENOUS at 04:05

## 2017-03-30 RX ADMIN — SODIUM CHLORIDE 40 MG: 9 INJECTION, SOLUTION INTRAVENOUS at 02:20

## 2017-03-30 RX ADMIN — METRONIDAZOLE 500 MG: 500 TABLET, FILM COATED ORAL at 16:08

## 2017-03-30 RX ADMIN — OXYCODONE HYDROCHLORIDE 10 MG: 5 TABLET ORAL at 21:06

## 2017-03-30 RX ADMIN — BUPROPION HYDROCHLORIDE 150 MG: 150 TABLET, FILM COATED, EXTENDED RELEASE ORAL at 10:53

## 2017-03-30 RX ADMIN — CIPROFLOXACIN 500 MG: 500 TABLET, FILM COATED ORAL at 21:00

## 2017-03-30 RX ADMIN — OXYCODONE HYDROCHLORIDE 10 MG: 5 TABLET ORAL at 06:02

## 2017-03-30 RX ADMIN — LISINOPRIL 2.5 MG: 2.5 TABLET ORAL at 10:53

## 2017-03-30 RX ADMIN — DOCUSATE SODIUM 100 MG: 100 CAPSULE, LIQUID FILLED ORAL at 10:53

## 2017-03-30 RX ADMIN — METRONIDAZOLE 500 MG: 500 TABLET, FILM COATED ORAL at 22:30

## 2017-03-30 RX ADMIN — POLYETHYLENE GLYCOL 3350 17 G: 17 POWDER, FOR SOLUTION ORAL at 10:52

## 2017-03-30 RX ADMIN — MIRTAZAPINE 30 MG: 30 TABLET, FILM COATED ORAL at 21:00

## 2017-03-30 RX ADMIN — METRONIDAZOLE 500 MG: 500 TABLET, FILM COATED ORAL at 06:02

## 2017-03-30 RX ADMIN — OXYCODONE HYDROCHLORIDE 5 MG: 5 TABLET ORAL at 10:48

## 2017-03-30 RX ADMIN — ONDANSETRON 4 MG: 2 INJECTION, SOLUTION INTRAMUSCULAR; INTRAVENOUS at 21:06

## 2017-03-30 RX ADMIN — ENOXAPARIN SODIUM 40 MG: 40 INJECTION SUBCUTANEOUS at 10:52

## 2017-03-30 RX ADMIN — METOPROLOL SUCCINATE 25 MG: 25 TABLET, FILM COATED, EXTENDED RELEASE ORAL at 10:52

## 2017-03-30 RX ADMIN — AMLODIPINE BESYLATE 5 MG: 5 TABLET ORAL at 21:00

## 2017-03-30 RX ADMIN — OXYCODONE HYDROCHLORIDE 10 MG: 5 TABLET ORAL at 01:55

## 2017-03-30 RX ADMIN — CIPROFLOXACIN 500 MG: 500 TABLET, FILM COATED ORAL at 10:53

## 2017-03-30 RX ADMIN — CLONAZEPAM 0.5 MG: 0.5 TABLET ORAL at 10:58

## 2017-03-30 RX ADMIN — PANTOPRAZOLE SODIUM 40 MG: 40 TABLET, DELAYED RELEASE ORAL at 10:52

## 2017-03-30 RX ADMIN — OXYCODONE HYDROCHLORIDE 10 MG: 5 TABLET ORAL at 16:08

## 2017-03-30 RX ADMIN — ONDANSETRON 4 MG: 2 INJECTION, SOLUTION INTRAMUSCULAR; INTRAVENOUS at 04:05

## 2017-03-30 ASSESSMENT — PAIN DESCRIPTION - LOCATION
LOCATION: ABDOMEN
LOCATION: ABDOMEN

## 2017-03-30 ASSESSMENT — PAIN SCALES - GENERAL
PAINLEVEL_OUTOF10: 4
PAINLEVEL_OUTOF10: 8
PAINLEVEL_OUTOF10: 8
PAINLEVEL_OUTOF10: 0
PAINLEVEL_OUTOF10: 1
PAINLEVEL_OUTOF10: 8
PAINLEVEL_OUTOF10: 5
PAINLEVEL_OUTOF10: 8
PAINLEVEL_OUTOF10: 8

## 2017-03-30 ASSESSMENT — PAIN DESCRIPTION - PROGRESSION: CLINICAL_PROGRESSION: NOT CHANGED

## 2017-03-30 ASSESSMENT — PAIN DESCRIPTION - FREQUENCY: FREQUENCY: INTERMITTENT

## 2017-03-30 ASSESSMENT — PAIN DESCRIPTION - DESCRIPTORS: DESCRIPTORS: SHARP

## 2017-03-30 ASSESSMENT — PAIN DESCRIPTION - PAIN TYPE: TYPE: SURGICAL PAIN

## 2017-03-30 ASSESSMENT — PAIN DESCRIPTION - ONSET: ONSET: ON-GOING

## 2017-03-31 VITALS
OXYGEN SATURATION: 93 % | BODY MASS INDEX: 20.16 KG/M2 | TEMPERATURE: 100.4 F | WEIGHT: 100 LBS | HEIGHT: 59 IN | RESPIRATION RATE: 18 BRPM | HEART RATE: 103 BPM | SYSTOLIC BLOOD PRESSURE: 143 MMHG | DIASTOLIC BLOOD PRESSURE: 86 MMHG

## 2017-03-31 LAB
ABSOLUTE EOS #: 0.16 K/UL (ref 0–0.4)
ABSOLUTE LYMPH #: 1.27 K/UL (ref 1–4.8)
ABSOLUTE MONO #: 3.5 K/UL (ref 0.1–0.8)
ANION GAP SERPL CALCULATED.3IONS-SCNC: 24 MMOL/L (ref 9–17)
BASOPHILS # BLD: 0 % (ref 0–2)
BASOPHILS ABSOLUTE: 0 K/UL (ref 0–0.2)
BUN BLDV-MCNC: 14 MG/DL (ref 8–23)
BUN/CREAT BLD: ABNORMAL (ref 9–20)
CALCIUM SERPL-MCNC: 9.3 MG/DL (ref 8.6–10.4)
CHLORIDE BLD-SCNC: 94 MMOL/L (ref 98–107)
CO2: 16 MMOL/L (ref 20–31)
CREAT SERPL-MCNC: 0.48 MG/DL (ref 0.5–0.9)
DIFFERENTIAL TYPE: ABNORMAL
EOSINOPHILS RELATIVE PERCENT: 1 % (ref 1–4)
GFR AFRICAN AMERICAN: >60 ML/MIN
GFR NON-AFRICAN AMERICAN: >60 ML/MIN
GFR SERPL CREATININE-BSD FRML MDRD: ABNORMAL ML/MIN/{1.73_M2}
GFR SERPL CREATININE-BSD FRML MDRD: ABNORMAL ML/MIN/{1.73_M2}
GLUCOSE BLD-MCNC: 99 MG/DL (ref 70–99)
HCT VFR BLD CALC: 41.5 % (ref 36–46)
HEMOGLOBIN: 13.4 G/DL (ref 12–16)
LYMPHOCYTES # BLD: 8 % (ref 24–44)
MCH RBC QN AUTO: 31.4 PG (ref 26–34)
MCHC RBC AUTO-ENTMCNC: 32.4 G/DL (ref 31–37)
MCV RBC AUTO: 96.9 FL (ref 80–100)
MONOCYTES # BLD: 22 % (ref 1–7)
MORPHOLOGY: NORMAL
PDW BLD-RTO: 14.4 % (ref 12.5–15.4)
PLATELET # BLD: 346 K/UL (ref 140–450)
PLATELET ESTIMATE: ABNORMAL
PMV BLD AUTO: 8.4 FL (ref 6–12)
POTASSIUM SERPL-SCNC: 3.7 MMOL/L (ref 3.7–5.3)
RBC # BLD: 4.28 M/UL (ref 4–5.2)
RBC # BLD: ABNORMAL 10*6/UL
SEG NEUTROPHILS: 69 % (ref 36–66)
SEGMENTED NEUTROPHILS ABSOLUTE COUNT: 10.97 K/UL (ref 1.8–7.7)
SODIUM BLD-SCNC: 134 MMOL/L (ref 135–144)
TROPONIN INTERP: NORMAL
TROPONIN INTERP: NORMAL
TROPONIN T: <0.03 NG/ML
TROPONIN T: <0.03 NG/ML
WBC # BLD: 15.9 K/UL (ref 3.5–11)
WBC # BLD: ABNORMAL 10*3/UL

## 2017-03-31 PROCEDURE — 85025 COMPLETE CBC W/AUTO DIFF WBC: CPT

## 2017-03-31 PROCEDURE — 36415 COLL VENOUS BLD VENIPUNCTURE: CPT

## 2017-03-31 PROCEDURE — 80048 BASIC METABOLIC PNL TOTAL CA: CPT

## 2017-03-31 PROCEDURE — 6370000000 HC RX 637 (ALT 250 FOR IP): Performed by: STUDENT IN AN ORGANIZED HEALTH CARE EDUCATION/TRAINING PROGRAM

## 2017-03-31 PROCEDURE — 6360000002 HC RX W HCPCS: Performed by: STUDENT IN AN ORGANIZED HEALTH CARE EDUCATION/TRAINING PROGRAM

## 2017-03-31 PROCEDURE — 2580000003 HC RX 258: Performed by: STUDENT IN AN ORGANIZED HEALTH CARE EDUCATION/TRAINING PROGRAM

## 2017-03-31 PROCEDURE — 6370000000 HC RX 637 (ALT 250 FOR IP): Performed by: EMERGENCY MEDICINE

## 2017-03-31 PROCEDURE — 84484 ASSAY OF TROPONIN QUANT: CPT

## 2017-03-31 RX ORDER — OXYCODONE HYDROCHLORIDE 5 MG/1
TABLET ORAL
Qty: 30 TABLET | Refills: 0 | Status: SHIPPED | OUTPATIENT
Start: 2017-03-31 | End: 2018-01-08

## 2017-03-31 RX ORDER — CIPROFLOXACIN 500 MG/1
500 TABLET, FILM COATED ORAL EVERY 12 HOURS SCHEDULED
Qty: 12 TABLET | Refills: 0 | Status: SHIPPED | OUTPATIENT
Start: 2017-03-31 | End: 2017-04-06

## 2017-03-31 RX ORDER — METRONIDAZOLE 500 MG/1
500 TABLET ORAL EVERY 8 HOURS SCHEDULED
Qty: 18 TABLET | Refills: 0 | Status: SHIPPED | OUTPATIENT
Start: 2017-03-31 | End: 2017-04-06

## 2017-03-31 RX ADMIN — FENTANYL CITRATE 50 MCG: 50 INJECTION, SOLUTION INTRAMUSCULAR; INTRAVENOUS at 09:17

## 2017-03-31 RX ADMIN — CIPROFLOXACIN 500 MG: 500 TABLET, FILM COATED ORAL at 09:16

## 2017-03-31 RX ADMIN — CLONAZEPAM 0.5 MG: 0.5 TABLET ORAL at 09:16

## 2017-03-31 RX ADMIN — DOCUSATE SODIUM 100 MG: 100 CAPSULE, LIQUID FILLED ORAL at 09:16

## 2017-03-31 RX ADMIN — OXYCODONE HYDROCHLORIDE 10 MG: 5 TABLET ORAL at 05:39

## 2017-03-31 RX ADMIN — OXYCODONE HYDROCHLORIDE 10 MG: 5 TABLET ORAL at 01:10

## 2017-03-31 RX ADMIN — Medication 10 ML: at 09:15

## 2017-03-31 RX ADMIN — POLYETHYLENE GLYCOL 3350 17 G: 17 POWDER, FOR SOLUTION ORAL at 09:16

## 2017-03-31 RX ADMIN — OXYCODONE HYDROCHLORIDE 10 MG: 5 TABLET ORAL at 09:37

## 2017-03-31 RX ADMIN — LISINOPRIL 2.5 MG: 2.5 TABLET ORAL at 09:17

## 2017-03-31 RX ADMIN — PANTOPRAZOLE SODIUM 40 MG: 40 TABLET, DELAYED RELEASE ORAL at 06:52

## 2017-03-31 RX ADMIN — ACETAMINOPHEN 650 MG: 325 TABLET ORAL at 09:17

## 2017-03-31 RX ADMIN — BUPROPION HYDROCHLORIDE 150 MG: 150 TABLET, FILM COATED, EXTENDED RELEASE ORAL at 09:16

## 2017-03-31 RX ADMIN — METOPROLOL SUCCINATE 25 MG: 25 TABLET, FILM COATED, EXTENDED RELEASE ORAL at 09:16

## 2017-03-31 RX ADMIN — METRONIDAZOLE 500 MG: 500 TABLET, FILM COATED ORAL at 06:52

## 2017-03-31 RX ADMIN — ENOXAPARIN SODIUM 40 MG: 40 INJECTION SUBCUTANEOUS at 09:16

## 2017-03-31 RX ADMIN — OXYCODONE HYDROCHLORIDE 10 MG: 5 TABLET ORAL at 13:57

## 2017-03-31 ASSESSMENT — PAIN DESCRIPTION - ONSET
ONSET: ON-GOING
ONSET: ON-GOING

## 2017-03-31 ASSESSMENT — PAIN SCALES - GENERAL
PAINLEVEL_OUTOF10: 10
PAINLEVEL_OUTOF10: 10
PAINLEVEL_OUTOF10: 0
PAINLEVEL_OUTOF10: 10
PAINLEVEL_OUTOF10: 9
PAINLEVEL_OUTOF10: 3
PAINLEVEL_OUTOF10: 0
PAINLEVEL_OUTOF10: 7

## 2017-03-31 ASSESSMENT — PAIN DESCRIPTION - FREQUENCY
FREQUENCY: INTERMITTENT
FREQUENCY: INTERMITTENT

## 2017-03-31 ASSESSMENT — PAIN DESCRIPTION - PAIN TYPE
TYPE: ACUTE PAIN
TYPE: SURGICAL PAIN

## 2017-03-31 ASSESSMENT — PAIN DESCRIPTION - LOCATION
LOCATION: ABDOMEN
LOCATION: BREAST

## 2017-03-31 ASSESSMENT — PAIN DESCRIPTION - PROGRESSION
CLINICAL_PROGRESSION: GRADUALLY WORSENING
CLINICAL_PROGRESSION: NOT CHANGED

## 2017-03-31 ASSESSMENT — PAIN DESCRIPTION - DESCRIPTORS
DESCRIPTORS: SHARP
DESCRIPTORS: ACHING

## 2017-03-31 ASSESSMENT — PAIN DESCRIPTION - ORIENTATION: ORIENTATION: RIGHT

## 2017-04-03 LAB
EKG ATRIAL RATE: 77 BPM
EKG P AXIS: 72 DEGREES
EKG P-R INTERVAL: 190 MS
EKG Q-T INTERVAL: 392 MS
EKG QRS DURATION: 70 MS
EKG QTC CALCULATION (BAZETT): 443 MS
EKG R AXIS: 49 DEGREES
EKG T AXIS: 62 DEGREES
EKG VENTRICULAR RATE: 77 BPM

## 2017-04-13 ENCOUNTER — OFFICE VISIT (OUTPATIENT)
Dept: SURGERY | Age: 62
End: 2017-04-13
Payer: COMMERCIAL

## 2017-04-13 VITALS
HEART RATE: 86 BPM | WEIGHT: 94 LBS | TEMPERATURE: 99.6 F | DIASTOLIC BLOOD PRESSURE: 89 MMHG | HEIGHT: 59 IN | SYSTOLIC BLOOD PRESSURE: 143 MMHG | BODY MASS INDEX: 18.95 KG/M2

## 2017-04-13 DIAGNOSIS — R53.83 OTHER FATIGUE: ICD-10-CM

## 2017-04-13 DIAGNOSIS — Z51.89 VISIT FOR WOUND CHECK: Primary | ICD-10-CM

## 2017-04-13 PROCEDURE — 99024 POSTOP FOLLOW-UP VISIT: CPT | Performed by: SPECIALIST

## 2017-06-02 ENCOUNTER — HOSPITAL ENCOUNTER (OUTPATIENT)
Age: 62
Discharge: HOME | End: 2017-06-02
Payer: COMMERCIAL

## 2017-06-02 ENCOUNTER — HOSPITAL ENCOUNTER (OUTPATIENT)
Dept: GENERAL RADIOLOGY | Age: 62
Discharge: HOME | End: 2017-06-02
Payer: COMMERCIAL

## 2017-06-02 DIAGNOSIS — R13.19 OTHER DYSPHAGIA: ICD-10-CM

## 2017-06-02 DIAGNOSIS — M25.511 RIGHT SHOULDER PAIN, UNSPECIFIED CHRONICITY: ICD-10-CM

## 2017-06-02 PROCEDURE — G8996 SWALLOW CURRENT STATUS: HCPCS

## 2017-06-02 PROCEDURE — 92611 MOTION FLUOROSCOPY/SWALLOW: CPT

## 2017-06-02 PROCEDURE — G8998 SWALLOW D/C STATUS: HCPCS

## 2017-06-02 PROCEDURE — 74230 X-RAY XM SWLNG FUNCJ C+: CPT

## 2017-06-02 PROCEDURE — G8997 SWALLOW GOAL STATUS: HCPCS

## 2017-06-02 PROCEDURE — 73030 X-RAY EXAM OF SHOULDER: CPT

## 2017-06-13 ENCOUNTER — HOSPITAL ENCOUNTER (EMERGENCY)
Age: 62
Discharge: HOME | End: 2017-06-13
Attending: EMERGENCY MEDICINE
Payer: COMMERCIAL

## 2017-06-13 VITALS
HEIGHT: 59 IN | RESPIRATION RATE: 18 BRPM | HEART RATE: 63 BPM | SYSTOLIC BLOOD PRESSURE: 134 MMHG | OXYGEN SATURATION: 97 % | BODY MASS INDEX: 18.14 KG/M2 | WEIGHT: 90 LBS | TEMPERATURE: 98.8 F | DIASTOLIC BLOOD PRESSURE: 93 MMHG

## 2017-06-13 DIAGNOSIS — R10.13 EPIGASTRIC PAIN: Primary | ICD-10-CM

## 2017-06-13 PROCEDURE — 99283 EMERGENCY DEPT VISIT LOW MDM: CPT

## 2017-06-13 RX ORDER — HYDROCODONE BITARTRATE AND ACETAMINOPHEN 5; 325 MG/1; MG/1
1 TABLET ORAL EVERY 6 HOURS PRN
Qty: 6 TABLET | Refills: 0 | Status: SHIPPED | OUTPATIENT
Start: 2017-06-13 | End: 2017-06-20

## 2017-06-13 ASSESSMENT — ENCOUNTER SYMPTOMS
SORE THROAT: 0
ABDOMINAL PAIN: 1
CHEST TIGHTNESS: 0
DIARRHEA: 0
NAUSEA: 0
SINUS PRESSURE: 0
SHORTNESS OF BREATH: 0
CONSTIPATION: 0
EYE DISCHARGE: 0

## 2017-06-14 ENCOUNTER — APPOINTMENT (OUTPATIENT)
Dept: CT IMAGING | Age: 62
End: 2017-06-14
Payer: COMMERCIAL

## 2017-06-14 ENCOUNTER — HOSPITAL ENCOUNTER (EMERGENCY)
Age: 62
Discharge: HOME | End: 2017-06-14
Attending: EMERGENCY MEDICINE
Payer: COMMERCIAL

## 2017-06-14 VITALS
TEMPERATURE: 97.3 F | HEART RATE: 74 BPM | WEIGHT: 90 LBS | HEIGHT: 59 IN | SYSTOLIC BLOOD PRESSURE: 124 MMHG | BODY MASS INDEX: 18.14 KG/M2 | RESPIRATION RATE: 16 BRPM | OXYGEN SATURATION: 100 % | DIASTOLIC BLOOD PRESSURE: 91 MMHG

## 2017-06-14 DIAGNOSIS — T63.481A INSECT STING, ACCIDENTAL OR UNINTENTIONAL, INITIAL ENCOUNTER: ICD-10-CM

## 2017-06-14 DIAGNOSIS — R10.33 PERIUMBILICAL ABDOMINAL PAIN: Primary | ICD-10-CM

## 2017-06-14 PROCEDURE — 74176 CT ABD & PELVIS W/O CONTRAST: CPT

## 2017-06-14 PROCEDURE — 99284 EMERGENCY DEPT VISIT MOD MDM: CPT

## 2017-06-14 PROCEDURE — 6370000000 HC RX 637 (ALT 250 FOR IP): Performed by: EMERGENCY MEDICINE

## 2017-06-14 RX ORDER — HYDROCODONE BITARTRATE AND ACETAMINOPHEN 5; 325 MG/1; MG/1
1 TABLET ORAL ONCE
Status: COMPLETED | OUTPATIENT
Start: 2017-06-14 | End: 2017-06-14

## 2017-06-14 RX ORDER — FENTANYL CITRATE 50 UG/ML
50 INJECTION, SOLUTION INTRAMUSCULAR; INTRAVENOUS ONCE
Status: DISCONTINUED | OUTPATIENT
Start: 2017-06-14 | End: 2017-06-14

## 2017-06-14 RX ORDER — ALBUTEROL SULFATE 90 UG/1
1-2 AEROSOL, METERED RESPIRATORY (INHALATION) EVERY 4 HOURS PRN
Qty: 1 INHALER | Refills: 0 | Status: ON HOLD | OUTPATIENT
Start: 2017-06-14 | End: 2018-08-12

## 2017-06-14 RX ORDER — ACETAMINOPHEN 325 MG/1
650 TABLET ORAL EVERY 6 HOURS PRN
Qty: 60 TABLET | Refills: 0 | Status: SHIPPED | OUTPATIENT
Start: 2017-06-14 | End: 2017-08-04

## 2017-06-14 RX ORDER — DIPHENHYDRAMINE HCL 25 MG
25 TABLET ORAL ONCE
Status: COMPLETED | OUTPATIENT
Start: 2017-06-14 | End: 2017-06-14

## 2017-06-14 RX ORDER — DIPHENHYDRAMINE HCL 25 MG
25 CAPSULE ORAL EVERY 6 HOURS PRN
Qty: 20 CAPSULE | Refills: 0 | Status: SHIPPED | OUTPATIENT
Start: 2017-06-14 | End: 2017-06-24

## 2017-06-14 RX ORDER — DIPHENHYDRAMINE HYDROCHLORIDE 50 MG/ML
25 INJECTION INTRAMUSCULAR; INTRAVENOUS ONCE
Status: DISCONTINUED | OUTPATIENT
Start: 2017-06-14 | End: 2017-06-14

## 2017-06-14 RX ADMIN — DIPHENHYDRAMINE HCL 25 MG: 25 TABLET ORAL at 17:30

## 2017-06-14 RX ADMIN — HYDROCODONE BITARTRATE AND ACETAMINOPHEN 1 TABLET: 5; 325 TABLET ORAL at 17:30

## 2017-06-14 ASSESSMENT — ENCOUNTER SYMPTOMS
VOMITING: 0
NAUSEA: 0
WHEEZING: 0
SHORTNESS OF BREATH: 0
ABDOMINAL PAIN: 1

## 2017-06-14 ASSESSMENT — PAIN SCALES - GENERAL: PAINLEVEL_OUTOF10: 3

## 2017-06-23 ENCOUNTER — HOSPITAL ENCOUNTER (OUTPATIENT)
Dept: MAMMOGRAPHY | Age: 62
Discharge: HOME | End: 2017-06-23
Payer: COMMERCIAL

## 2017-06-23 ENCOUNTER — HOSPITAL ENCOUNTER (OUTPATIENT)
Age: 62
Discharge: HOME | End: 2017-06-23
Payer: COMMERCIAL

## 2017-06-23 DIAGNOSIS — Z12.31 ENCOUNTER FOR SCREENING MAMMOGRAM FOR MALIGNANT NEOPLASM OF BREAST: ICD-10-CM

## 2017-06-23 LAB
ALBUMIN SERPL-MCNC: 4.2 G/DL (ref 3.5–5.2)
ALBUMIN/GLOBULIN RATIO: 1.1 (ref 1–2.5)
ALP BLD-CCNC: 91 U/L (ref 35–104)
ALT SERPL-CCNC: 22 U/L (ref 5–33)
ANION GAP SERPL CALCULATED.3IONS-SCNC: 13 MMOL/L (ref 9–17)
AST SERPL-CCNC: 30 U/L
BILIRUB SERPL-MCNC: 0.51 MG/DL (ref 0.3–1.2)
BUN BLDV-MCNC: 6 MG/DL (ref 8–23)
BUN/CREAT BLD: ABNORMAL (ref 9–20)
CALCIUM SERPL-MCNC: 9.4 MG/DL (ref 8.6–10.4)
CHLORIDE BLD-SCNC: 99 MMOL/L (ref 98–107)
CO2: 25 MMOL/L (ref 20–31)
CREAT SERPL-MCNC: 0.53 MG/DL (ref 0.5–0.9)
GFR AFRICAN AMERICAN: >60 ML/MIN
GFR NON-AFRICAN AMERICAN: >60 ML/MIN
GFR SERPL CREATININE-BSD FRML MDRD: ABNORMAL ML/MIN/{1.73_M2}
GFR SERPL CREATININE-BSD FRML MDRD: ABNORMAL ML/MIN/{1.73_M2}
GLUCOSE BLD-MCNC: 84 MG/DL (ref 70–99)
HCT VFR BLD CALC: 44.4 % (ref 36–46)
HEMOGLOBIN: 14.5 G/DL (ref 12–16)
HEPATITIS B SURFACE ANTIGEN: NONREACTIVE
MCH RBC QN AUTO: 31 PG (ref 26–34)
MCHC RBC AUTO-ENTMCNC: 32.7 G/DL (ref 31–37)
MCV RBC AUTO: 95.1 FL (ref 80–100)
PDW BLD-RTO: 14.4 % (ref 12.5–15.4)
PLATELET # BLD: 350 K/UL (ref 140–450)
PMV BLD AUTO: 7.7 FL (ref 6–12)
POTASSIUM SERPL-SCNC: 4 MMOL/L (ref 3.7–5.3)
RBC # BLD: 4.66 M/UL (ref 4–5.2)
SODIUM BLD-SCNC: 137 MMOL/L (ref 135–144)
TOTAL PROTEIN: 8.2 G/DL (ref 6.4–8.3)
WBC # BLD: 4.8 K/UL (ref 3.5–11)

## 2017-06-23 PROCEDURE — 82977 ASSAY OF GGT: CPT

## 2017-06-23 PROCEDURE — 85027 COMPLETE CBC AUTOMATED: CPT

## 2017-06-23 PROCEDURE — 87340 HEPATITIS B SURFACE AG IA: CPT

## 2017-06-23 PROCEDURE — 84460 ALANINE AMINO (ALT) (SGPT): CPT

## 2017-06-23 PROCEDURE — 84450 TRANSFERASE (AST) (SGOT): CPT

## 2017-06-23 PROCEDURE — G0202 SCR MAMMO BI INCL CAD: HCPCS

## 2017-06-23 PROCEDURE — 36415 COLL VENOUS BLD VENIPUNCTURE: CPT

## 2017-06-23 PROCEDURE — 87522 HEPATITIS C REVRS TRNSCRPJ: CPT

## 2017-06-23 PROCEDURE — 80053 COMPREHEN METABOLIC PANEL: CPT

## 2017-06-23 PROCEDURE — 84520 ASSAY OF UREA NITROGEN: CPT

## 2017-06-23 PROCEDURE — 83883 ASSAY NEPHELOMETRY NOT SPEC: CPT

## 2017-06-23 PROCEDURE — 87902 NFCT AGT GNTYP ALYS HEP C: CPT

## 2017-06-26 LAB
ALANINE AMINOTRANSFERASE, FIBROMETER: 27 U/L (ref 5–40)
ALPHA-2-MACROGLOBULIN, FIBROMETER: 460 MG/DL (ref 131–293)
ASPARTATE AMINOTRANSFERASE, FIBROMETER: 35 U/L (ref 9–40)
CIRRHOMETER PATIENT SCORE: 0.01
EER FIBROMETER REPORT: ABNORMAL
FIBROMETER INTERPRETATION: ABNORMAL
FIBROMETER PATIENT SCORE: 0.58
FIBROMETER PLATELET COUNT: 350
FIBROMETER PROTHROMBIN INDEX: 96 % (ref 90–120)
FIBROSIS METAVIR CLASSIFICATION: ABNORMAL
GAMMA GLUTAMYL TRANSFERASE, FIBROMETER: 160 U/L (ref 7–33)
INFLAMETER METAVIR CLASSIFICATION: ABNORMAL
INFLAMETER PATIENT SCORE: 0.43
UREA NITROGEN, FIBROMETER: 6 MG/DL (ref 7–20)

## 2017-06-28 LAB
DIRECT EXAM: ABNORMAL
Lab: ABNORMAL
SPECIMEN DESCRIPTION: ABNORMAL
STATUS: ABNORMAL

## 2017-06-30 LAB
SEND OUT REPORT: NORMAL
TEST NAME: NORMAL

## 2017-08-04 ENCOUNTER — APPOINTMENT (OUTPATIENT)
Dept: GENERAL RADIOLOGY | Age: 62
End: 2017-08-04
Payer: COMMERCIAL

## 2017-08-04 ENCOUNTER — HOSPITAL ENCOUNTER (EMERGENCY)
Age: 62
Discharge: HOME | End: 2017-08-04
Attending: EMERGENCY MEDICINE
Payer: COMMERCIAL

## 2017-08-04 VITALS
OXYGEN SATURATION: 96 % | TEMPERATURE: 97.3 F | BODY MASS INDEX: 19.19 KG/M2 | WEIGHT: 95 LBS | HEART RATE: 73 BPM | RESPIRATION RATE: 21 BRPM | SYSTOLIC BLOOD PRESSURE: 154 MMHG | DIASTOLIC BLOOD PRESSURE: 85 MMHG

## 2017-08-04 DIAGNOSIS — R07.89 MUSCULOSKELETAL CHEST PAIN: Primary | ICD-10-CM

## 2017-08-04 DIAGNOSIS — R07.9 CHEST PAIN, UNSPECIFIED TYPE: ICD-10-CM

## 2017-08-04 LAB
ABSOLUTE EOS #: 0.1 K/UL (ref 0–0.4)
ABSOLUTE LYMPH #: 2.6 K/UL (ref 1–4.8)
ABSOLUTE MONO #: 0.5 K/UL (ref 0.1–1.2)
ANION GAP SERPL CALCULATED.3IONS-SCNC: 14 MMOL/L (ref 9–17)
BASOPHILS # BLD: 2 %
BASOPHILS ABSOLUTE: 0.1 K/UL (ref 0–0.2)
BUN BLDV-MCNC: 7 MG/DL (ref 8–23)
BUN/CREAT BLD: ABNORMAL (ref 9–20)
CALCIUM SERPL-MCNC: 8.8 MG/DL (ref 8.6–10.4)
CHLORIDE BLD-SCNC: 103 MMOL/L (ref 98–107)
CO2: 27 MMOL/L (ref 20–31)
CREAT SERPL-MCNC: 0.42 MG/DL (ref 0.5–0.9)
DIFFERENTIAL TYPE: ABNORMAL
EOSINOPHILS RELATIVE PERCENT: 2 %
GFR AFRICAN AMERICAN: >60 ML/MIN
GFR NON-AFRICAN AMERICAN: >60 ML/MIN
GFR SERPL CREATININE-BSD FRML MDRD: ABNORMAL ML/MIN/{1.73_M2}
GFR SERPL CREATININE-BSD FRML MDRD: ABNORMAL ML/MIN/{1.73_M2}
GLUCOSE BLD-MCNC: 84 MG/DL (ref 70–99)
HCT VFR BLD CALC: 46.1 % (ref 36–46)
HEMOGLOBIN: 15.2 G/DL (ref 12–16)
LYMPHOCYTES # BLD: 41 %
MCH RBC QN AUTO: 31.4 PG (ref 26–34)
MCHC RBC AUTO-ENTMCNC: 33 G/DL (ref 31–37)
MCV RBC AUTO: 95 FL (ref 80–100)
MONOCYTES # BLD: 8 %
PDW BLD-RTO: 14.5 % (ref 12.5–15.4)
PLATELET # BLD: 299 K/UL (ref 140–450)
PLATELET ESTIMATE: ABNORMAL
PMV BLD AUTO: 8.4 FL (ref 6–12)
POC TROPONIN I: 0 NG/ML (ref 0–0.1)
POC TROPONIN I: 0 NG/ML (ref 0–0.1)
POC TROPONIN INTERP: NORMAL
POC TROPONIN INTERP: NORMAL
POTASSIUM SERPL-SCNC: 4.6 MMOL/L (ref 3.7–5.3)
RBC # BLD: 4.85 M/UL (ref 4–5.2)
RBC # BLD: ABNORMAL 10*6/UL
SEG NEUTROPHILS: 47 %
SEGMENTED NEUTROPHILS ABSOLUTE COUNT: 2.9 K/UL (ref 1.8–7.7)
SODIUM BLD-SCNC: 144 MMOL/L (ref 135–144)
WBC # BLD: 6.3 K/UL (ref 3.5–11)
WBC # BLD: ABNORMAL 10*3/UL

## 2017-08-04 PROCEDURE — 99285 EMERGENCY DEPT VISIT HI MDM: CPT

## 2017-08-04 PROCEDURE — 71020 XR CHEST STANDARD TWO VW: CPT

## 2017-08-04 PROCEDURE — 80048 BASIC METABOLIC PNL TOTAL CA: CPT

## 2017-08-04 PROCEDURE — 84484 ASSAY OF TROPONIN QUANT: CPT

## 2017-08-04 PROCEDURE — 93005 ELECTROCARDIOGRAM TRACING: CPT

## 2017-08-04 PROCEDURE — 85025 COMPLETE CBC W/AUTO DIFF WBC: CPT

## 2017-08-04 RX ORDER — CYCLOBENZAPRINE HCL 10 MG
10 TABLET ORAL 3 TIMES DAILY PRN
Qty: 10 TABLET | Refills: 0 | Status: SHIPPED | OUTPATIENT
Start: 2017-08-04 | End: 2018-01-08

## 2017-08-04 RX ORDER — ACETAMINOPHEN 500 MG
1000 TABLET ORAL EVERY 8 HOURS PRN
Qty: 60 TABLET | Refills: 0 | Status: ON HOLD | OUTPATIENT
Start: 2017-08-04 | End: 2018-08-12

## 2017-08-04 ASSESSMENT — PAIN DESCRIPTION - PAIN TYPE: TYPE: ACUTE PAIN

## 2017-08-04 ASSESSMENT — PAIN SCALES - GENERAL: PAINLEVEL_OUTOF10: 8

## 2017-08-04 ASSESSMENT — PAIN DESCRIPTION - DESCRIPTORS: DESCRIPTORS: PRESSURE

## 2017-08-04 ASSESSMENT — PAIN DESCRIPTION - LOCATION: LOCATION: CHEST

## 2017-08-04 ASSESSMENT — PAIN DESCRIPTION - FREQUENCY: FREQUENCY: CONTINUOUS

## 2017-08-04 ASSESSMENT — PAIN DESCRIPTION - ORIENTATION: ORIENTATION: RIGHT

## 2017-08-05 ASSESSMENT — ENCOUNTER SYMPTOMS
RHINORRHEA: 0
COUGH: 0
DIARRHEA: 0
SHORTNESS OF BREATH: 0
NAUSEA: 0
VOMITING: 0
EYE DISCHARGE: 0
ABDOMINAL PAIN: 0
CHEST TIGHTNESS: 1
BLOOD IN STOOL: 0
EYE REDNESS: 0
SORE THROAT: 0

## 2017-08-10 LAB
EKG ATRIAL RATE: 80 BPM
EKG P AXIS: 76 DEGREES
EKG P-R INTERVAL: 150 MS
EKG Q-T INTERVAL: 366 MS
EKG QRS DURATION: 72 MS
EKG QTC CALCULATION (BAZETT): 422 MS
EKG R AXIS: 47 DEGREES
EKG T AXIS: 39 DEGREES
EKG VENTRICULAR RATE: 80 BPM

## 2017-10-09 ENCOUNTER — HOSPITAL ENCOUNTER (OUTPATIENT)
Age: 62
Discharge: HOME | End: 2017-10-09
Payer: COMMERCIAL

## 2017-10-09 LAB
AMPHETAMINE SCREEN URINE: NEGATIVE
BARBITURATE SCREEN URINE: POSITIVE
BENZODIAZEPINE SCREEN, URINE: NEGATIVE
BUPRENORPHINE URINE: ABNORMAL
CANNABINOID SCREEN URINE: NEGATIVE
COCAINE METABOLITE, URINE: NEGATIVE
HEPATITIS C ANTIBODY: REACTIVE
MDMA URINE: ABNORMAL
METHADONE SCREEN, URINE: NEGATIVE
METHAMPHETAMINE, URINE: ABNORMAL
OPIATES, URINE: POSITIVE
OXYCODONE SCREEN URINE: NEGATIVE
PHENCYCLIDINE, URINE: NEGATIVE
PROPOXYPHENE, URINE: ABNORMAL
TEST INFORMATION: ABNORMAL
TRICYCLIC ANTIDEPRESSANTS, UR: ABNORMAL

## 2017-10-09 PROCEDURE — 80307 DRUG TEST PRSMV CHEM ANLYZR: CPT

## 2017-10-09 PROCEDURE — 36415 COLL VENOUS BLD VENIPUNCTURE: CPT

## 2017-10-09 PROCEDURE — 86803 HEPATITIS C AB TEST: CPT

## 2017-10-25 ENCOUNTER — HOSPITAL ENCOUNTER (EMERGENCY)
Age: 62
Discharge: HOME | End: 2017-10-25
Attending: EMERGENCY MEDICINE
Payer: COMMERCIAL

## 2017-10-25 VITALS
BODY MASS INDEX: 19.76 KG/M2 | HEART RATE: 95 BPM | RESPIRATION RATE: 16 BRPM | DIASTOLIC BLOOD PRESSURE: 95 MMHG | OXYGEN SATURATION: 98 % | WEIGHT: 98 LBS | TEMPERATURE: 97.5 F | SYSTOLIC BLOOD PRESSURE: 141 MMHG | HEIGHT: 59 IN

## 2017-10-25 DIAGNOSIS — Z76.0 ENCOUNTER FOR MEDICATION REFILL: Primary | ICD-10-CM

## 2017-10-25 PROCEDURE — 99283 EMERGENCY DEPT VISIT LOW MDM: CPT

## 2017-10-25 ASSESSMENT — ENCOUNTER SYMPTOMS
SINUS PRESSURE: 0
SORE THROAT: 0
NAUSEA: 0
EYE DISCHARGE: 0
CONSTIPATION: 0
CHEST TIGHTNESS: 0
ABDOMINAL PAIN: 0
SHORTNESS OF BREATH: 0
DIARRHEA: 0

## 2017-10-25 NOTE — ED PROVIDER NOTES
[facility]  eMERGENCY dEPARTMENT eNCOUnter      Pt Name: Tarik Miles  MRN: [id number]  Laverne 1955  Date of evaluation: 10/25/2017  PCP:    Donte Mirza [id number]       Chief Complaint   Patient presents with    Muscle Pain     pt states pulled a muscle in right shoulder into back. Pt states out of her muscle relaxer          HISTORY OF PRESENT ILLNESS    Emi Daniels is a 57 y.o. female who presents With complaints of eating out of her muscle relaxant. Patient states that she does have a manual  and occasionally needs Flexeril. It appears the patient has done this quite a bit lately as she is out of her Flexeril. Patient has no other complaints. Symptoms are similar to previous. Location/Symptom:    Timing/Onset:    Context/Setting:    Quality: Aching pain. Duration:  Several days. Chronic pain. Modifying Factors: Movement  Severity: Mild      REVIEW OF SYSTEMS       Review of Systems   Constitutional: Negative for activity change and fatigue. HENT: Negative for sinus pressure and sore throat. Eyes: Negative for discharge and visual disturbance. Respiratory: Negative for chest tightness and shortness of breath. Gastrointestinal: Negative for abdominal pain, constipation, diarrhea and nausea. Genitourinary: Negative for dysuria, frequency, vaginal bleeding and vaginal discharge. Musculoskeletal: Negative for myalgias and neck pain. Neurological: Negative for dizziness and headaches. Psychiatric/Behavioral: Negative for dysphoric mood. The patient is not nervous/anxious. PAST MEDICAL HISTORY    has a past medical history of Chronic rhinitis; Cigarette smoker; Depression; GERD (gastroesophageal reflux disease); Hepatitis C, chronic ([address] Hypertension; Migraine; Multiple transfusions; Osteoarthritis; Scoliosis; and Substance abuse.     SURGICAL HISTORY      has a past surgical history that includes  section; LEEP (2002); Upper gastrointestinal endoscopy (2008); Gastric bypass surgery; Appendectomy (03/27/2017); and laparoscopic appendectomy (N/A, 3/27/2017). CURRENT MEDICATIONS       Previous Medications    ACETAMINOPHEN (APAP EXTRA STRENGTH) 500 MG TABLET    Take 2 tablets by mouth every 8 hours as needed for Pain    ALBUTEROL SULFATE HFA (PROVENTIL HFA) 108 (90 BASE) MCG/ACT INHALER    Inhale 1-2 puffs into the lungs every 4 hours as needed for Wheezing or Shortness of Breath (Space out to every 6 hours as symptoms improve) Space out to every 6 hours as symptoms improve. AMLODIPINE (NORVASC) 5 MG TABLET        BENZONATATE (TESSALON) 100 MG CAPSULE        BUPROPION (WELLBUTRIN XL) 150 MG EXTENDED RELEASE TABLET        CETIRIZINE (ZYRTEC ALLERGY) 10 MG TABLET    Take 1 tablet by mouth daily    CLONAZEPAM (KLONOPIN) 0.5 MG TABLET        CYCLOBENZAPRINE (FLEXERIL) 10 MG TABLET    Take 1 tablet by mouth 3 times daily as needed for Muscle spasms    DOCUSATE SODIUM (COLACE) 100 MG CAPSULE    Take 1 capsule by mouth 2 times daily as needed for Constipation    GABAPENTIN (NEURONTIN) 300 MG CAPSULE        HYDROXYZINE (ATARAX) 25 MG TABLET        LISINOPRIL (PRINIVIL;ZESTRIL) 2.5 MG TABLET        MECLIZINE (ANTIVERT) 25 MG CHEW    Take 1 tablet by mouth 3 times daily as needed (dizzyness)    METOPROLOL SUCCINATE (TOPROL XL) 25 MG EXTENDED RELEASE TABLET        MIRTAZAPINE (REMERON) 30 MG TABLET        OMEPRAZOLE (PRILOSEC) 40 MG DELAYED RELEASE CAPSULE        OXYCODONE (ROXICODONE) 5 MG IMMEDIATE RELEASE TABLET    Take 1-2 tablets PO q 4-6hr prn pain. SUBOXONE 8-2 MG FILM           ALLERGIES     is allergic to aspirin; keflet [cephalexin]; and motrin [ibuprofen]. FAMILY HISTORY     has no family status information on file. family history is not on file. SOCIAL HISTORY      reports that she has quit smoking. Her smoking use included Cigarettes. She smoked 0.25 packs per day.  She has never used smokeless tobacco. She reports that she does not drink alcohol or use drugs. PHYSICAL EXAM     INITIAL VITALS:  height is 4' 11\" (1.499 m) and weight is 98 lb (44.5 kg). Her oral temperature is 97.5 °F (36.4 °C). Her blood pressure is 141/95 (abnormal) and her pulse is 95. Her respiration is 16 and oxygen saturation is 98%. Physical Exam   Constitutional: She is oriented to person, place, and time. She appears well-developed and well-nourished. HENT:   Head: Normocephalic and atraumatic. Right Ear: External ear normal.   Left Ear: External ear normal.   Nose: Nose normal.   Eyes: Pupils are equal, round, and reactive to light. Right eye exhibits no discharge. Left eye exhibits no discharge. No scleral icterus. Neck: Normal range of motion. Neck supple. No tracheal deviation present. Pulmonary/Chest: Effort normal. No stridor. Musculoskeletal: She exhibits no edema or tenderness. Lymphadenopathy:     She has no cervical adenopathy. Neurological: She is alert and oriented to person, place, and time. Coordination normal.   Skin: Skin is warm and dry. No rash noted. She is not diaphoretic. Psychiatric: She has a normal mood and affect.  Her behavior is normal.       DIFFERENTIAL DIAGNOSIS/ MDM:     Patient is effectively here for medication refill    DIAGNOSTIC RESULTS     EKG: All EKG's are interpreted by the Emergency Department Physician who either signs or Co-signs this chart in the absence of a cardiologist.         RADIOLOGY:   I directly visualized the following  images and reviewed the radiologist interpretations:  No orders to display             ED BEDSIDE ULTRASOUND:        LABS:  Labs Reviewed - No data to display         EMERGENCY DEPARTMENT COURSE:   Vitals:    Vitals:    10/25/17 1041   BP: (!) 141/95   Pulse: 95   Resp: 16   Temp: 97.5 °F (36.4 °C)   TempSrc: Oral   SpO2: 98%   Weight: 98 lb (44.5 kg)   Height: 4' 11\" (1.499 m)     -------------------------  BP: (!) 141/95, Temp: 97.5 °F

## 2018-01-08 ENCOUNTER — APPOINTMENT (OUTPATIENT)
Dept: GENERAL RADIOLOGY | Age: 63
End: 2018-01-08
Payer: MEDICARE

## 2018-01-08 ENCOUNTER — HOSPITAL ENCOUNTER (EMERGENCY)
Age: 63
Discharge: HOME | End: 2018-01-08
Attending: EMERGENCY MEDICINE
Payer: MEDICARE

## 2018-01-08 VITALS
RESPIRATION RATE: 16 BRPM | SYSTOLIC BLOOD PRESSURE: 123 MMHG | DIASTOLIC BLOOD PRESSURE: 81 MMHG | OXYGEN SATURATION: 99 % | TEMPERATURE: 97.2 F | HEART RATE: 96 BPM | HEIGHT: 59 IN | WEIGHT: 98 LBS | BODY MASS INDEX: 19.76 KG/M2

## 2018-01-08 DIAGNOSIS — J06.9 ACUTE UPPER RESPIRATORY INFECTION: Primary | ICD-10-CM

## 2018-01-08 PROCEDURE — 99283 EMERGENCY DEPT VISIT LOW MDM: CPT

## 2018-01-08 PROCEDURE — 6370000000 HC RX 637 (ALT 250 FOR IP): Performed by: EMERGENCY MEDICINE

## 2018-01-08 PROCEDURE — 71046 X-RAY EXAM CHEST 2 VIEWS: CPT

## 2018-01-08 RX ORDER — GUAIFENESIN 600 MG/1
1200 TABLET, EXTENDED RELEASE ORAL 2 TIMES DAILY
Qty: 14 TABLET | Refills: 0 | Status: SHIPPED | OUTPATIENT
Start: 2018-01-08 | End: 2018-01-15

## 2018-01-08 RX ORDER — BENZONATATE 100 MG/1
100 CAPSULE ORAL 3 TIMES DAILY PRN
Qty: 30 CAPSULE | Refills: 0 | Status: SHIPPED | OUTPATIENT
Start: 2018-01-08 | End: 2018-01-15

## 2018-01-08 RX ORDER — BENZONATATE 100 MG/1
100 CAPSULE ORAL ONCE
Status: COMPLETED | OUTPATIENT
Start: 2018-01-08 | End: 2018-01-08

## 2018-01-08 RX ORDER — PSEUDOEPHEDRINE HCL 120 MG/1
120 TABLET, FILM COATED, EXTENDED RELEASE ORAL ONCE
Status: COMPLETED | OUTPATIENT
Start: 2018-01-08 | End: 2018-01-08

## 2018-01-08 RX ADMIN — BENZONATATE 100 MG: 100 CAPSULE ORAL at 13:28

## 2018-01-08 RX ADMIN — PSEUDOEPHEDRINE HYDROCHLORIDE 120 MG: 120 TABLET, FILM COATED, EXTENDED RELEASE ORAL at 13:53

## 2018-01-08 ASSESSMENT — PAIN SCALES - GENERAL: PAINLEVEL_OUTOF10: 5

## 2018-01-08 NOTE — ED PROVIDER NOTES
[facility]     Emergency Department     Faculty Attestation    I performed a history and physical examination of the patient and discussed management with the resident. I have reviewed and agree with the residents findings including all diagnostic interpretations, and treatment plans as written. Any areas of disagreement are noted on the chart. I was personally present for the key portions of any procedures. I have documented in the chart those procedures where I was not present during the key portions. I have reviewed the emergency nurses triage note. I agree with the chief complaint, past medical history, past surgical history, allergies, medications, social and family history as documented unless otherwise noted below. Documentation of the HPI, Physical Exam and Medical Decision Making performed by scribmio is based on my personal performance of the HPI, PE and MDM. For Physician Assistant/ Nurse Practitioner cases/documentation I have personally evaluated this patient and have completed at least one if not all key elements of the E/M (history, physical exam, and MDM). Additional findings are as noted. Primary Care Physician: Alm Seip, CNP    History: This is a 57 y.o. female who presents to the Emergency Department with complaint of Cough, nasal congestion. This been ongoing for last week. The patient does have a productive yellow sputum. The patient denies any shortness of breath or chest pain. The patient has no nausea vomiting or diarrhea. The patient denies any recent long travel or pain or swelling in her legs. Physical:   height is 4' 11\" (1.499 m) and weight is 98 lb (44.5 kg). Her oral temperature is 97.2 °F (36.2 °C). Her blood pressure is 123/81 and her pulse is 112. Her respiration is 16 and oxygen saturation is 99%.   Lungs are clear to auscultation bilaterally, heart tachycardic with a regular rhythm, abdomen is soft
Type of Exam: Initial FINDINGS: The cardiac silhouette is normal in size. Mediastinal contours are normal. The lungs are hyperinflated suggesting COPD. There is no focal lung infiltrate. No pleural effusion or pneumothorax. There is a dextrocurvature in the lower thoracic spine. Surgical clips are noted in the supraclavicular regions. 1. Hyperinflated lungs suggesting underlying emphysema. 2. No acute cardiopulmonary disease. EKG      All EKG's are interpreted by the Emergency Department Physician who either signs or Co-signs this chart in the absence of a cardiologist.    EMERGENCY DEPARTMENT COURSE:  57 y.o. female with 1 week of yellow productive cough. Chest x-ray shows no pneumonia. Patient is smoker but has normal vital signs, no temperature, lungs clear to auscultation, do not feel the patient warrants antibiotics at this time. Will prescribe expectorant, cough suppressant and informed to follow up with PCP. Patient given albuterol inhaler for home. Okay to be discharged. PLAN/MEDS:  Orders Placed This Encounter   Procedures    XR CHEST STANDARD (2 VW)    MDI treatment       Orders Placed This Encounter   Medications    pseudoephedrine (SUDAFED 12 HR) extended release tablet 120 mg    benzonatate (TESSALON) capsule 100 mg    benzonatate (TESSALON PERLES) 100 MG capsule     Sig: Take 1 capsule by mouth 3 times daily as needed for Cough     Dispense:  30 capsule     Refill:  0    guaiFENesin (MUCINEX) 600 MG extended release tablet     Sig: Take 2 tablets by mouth 2 times daily for 7 days     Dispense:  14 tablet     Refill:  0         PROCEDURES:  None    CONSULTS:  None    CRITICAL CARE:  None    FINAL IMPRESSION      1.  Acute upper respiratory infection          DISPOSITION / PLAN     DISPOSITION Decision To Discharge 01/08/2018 02:16:29 PM      PATIENT REFERRED TO:  Nasima Mak [address] [facility]  [phone]    Call in 1 day  for

## 2018-02-12 ENCOUNTER — HOSPITAL ENCOUNTER (OUTPATIENT)
Age: 63
Discharge: HOME | End: 2018-02-12
Payer: MEDICARE

## 2018-02-12 LAB
C-REACTIVE PROTEIN: 2.4 MG/L (ref 0–5)
MAGNESIUM: 2 MG/DL (ref 1.6–2.6)
SEDIMENTATION RATE, ERYTHROCYTE: 13 MM (ref 0–20)
TOTAL CK: 52 U/L (ref 26–192)
TSH SERPL DL<=0.05 MIU/L-ACNC: 2.11 MIU/L (ref 0.3–5)
VITAMIN B-12: 629 PG/ML (ref 232–1245)

## 2018-02-12 PROCEDURE — 84443 ASSAY THYROID STIM HORMONE: CPT

## 2018-02-12 PROCEDURE — 82607 VITAMIN B-12: CPT

## 2018-02-12 PROCEDURE — 82550 ASSAY OF CK (CPK): CPT

## 2018-02-12 PROCEDURE — 85651 RBC SED RATE NONAUTOMATED: CPT

## 2018-02-12 PROCEDURE — 86140 C-REACTIVE PROTEIN: CPT

## 2018-02-12 PROCEDURE — 86038 ANTINUCLEAR ANTIBODIES: CPT

## 2018-02-12 PROCEDURE — 83735 ASSAY OF MAGNESIUM: CPT

## 2018-02-13 LAB — ANTI-NUCLEAR ANTIBODY (ANA): NEGATIVE

## 2018-02-27 ENCOUNTER — HOSPITAL ENCOUNTER (EMERGENCY)
Age: 63
Discharge: LEFT BEFORE DISCHARGE | End: 2018-02-27
Attending: EMERGENCY MEDICINE
Payer: MEDICARE

## 2018-02-27 ENCOUNTER — APPOINTMENT (OUTPATIENT)
Dept: ULTRASOUND IMAGING | Age: 63
End: 2018-02-27
Payer: MEDICARE

## 2018-02-27 VITALS
BODY MASS INDEX: 18.35 KG/M2 | HEIGHT: 59 IN | DIASTOLIC BLOOD PRESSURE: 102 MMHG | OXYGEN SATURATION: 100 % | HEART RATE: 89 BPM | TEMPERATURE: 97.9 F | SYSTOLIC BLOOD PRESSURE: 163 MMHG | RESPIRATION RATE: 18 BRPM | WEIGHT: 91 LBS

## 2018-02-27 DIAGNOSIS — R10.11 RIGHT UPPER QUADRANT ABDOMINAL PAIN: Primary | ICD-10-CM

## 2018-02-27 LAB
-: NORMAL
AMORPHOUS: NORMAL
BACTERIA: NORMAL
BILIRUBIN URINE: NEGATIVE
CASTS UA: NORMAL /LPF (ref 0–8)
COLOR: YELLOW
COMMENT UA: ABNORMAL
CRYSTALS, UA: NORMAL /HPF
EPITHELIAL CELLS UA: NORMAL /HPF (ref 0–5)
GLUCOSE URINE: NEGATIVE
KETONES, URINE: NEGATIVE
LEUKOCYTE ESTERASE, URINE: ABNORMAL
MUCUS: NORMAL
NITRITE, URINE: NEGATIVE
OTHER OBSERVATIONS UA: NORMAL
PH UA: 8.5 (ref 5–8)
PROTEIN UA: NEGATIVE
RBC UA: NORMAL /HPF (ref 0–4)
RENAL EPITHELIAL, UA: NORMAL /HPF
SPECIFIC GRAVITY UA: 1.01 (ref 1–1.03)
TRICHOMONAS: NORMAL
TURBIDITY: CLEAR
URINE HGB: NEGATIVE
UROBILINOGEN, URINE: NORMAL
WBC UA: NORMAL /HPF (ref 0–5)
YEAST: NORMAL

## 2018-02-27 PROCEDURE — 81001 URINALYSIS AUTO W/SCOPE: CPT

## 2018-02-27 PROCEDURE — 80053 COMPREHEN METABOLIC PANEL: CPT

## 2018-02-27 PROCEDURE — 6370000000 HC RX 637 (ALT 250 FOR IP): Performed by: STUDENT IN AN ORGANIZED HEALTH CARE EDUCATION/TRAINING PROGRAM

## 2018-02-27 PROCEDURE — 83690 ASSAY OF LIPASE: CPT

## 2018-02-27 PROCEDURE — 99284 EMERGENCY DEPT VISIT MOD MDM: CPT

## 2018-02-27 PROCEDURE — 6360000002 HC RX W HCPCS: Performed by: STUDENT IN AN ORGANIZED HEALTH CARE EDUCATION/TRAINING PROGRAM

## 2018-02-27 PROCEDURE — 76705 ECHO EXAM OF ABDOMEN: CPT

## 2018-02-27 RX ORDER — ONDANSETRON 4 MG/1
4 TABLET, FILM COATED ORAL ONCE
Status: COMPLETED | OUTPATIENT
Start: 2018-02-27 | End: 2018-02-27

## 2018-02-27 RX ORDER — FAMOTIDINE 20 MG/1
20 TABLET, FILM COATED ORAL ONCE
Status: COMPLETED | OUTPATIENT
Start: 2018-02-27 | End: 2018-02-27

## 2018-02-27 RX ADMIN — ONDANSETRON HYDROCHLORIDE 4 MG: 4 TABLET, FILM COATED ORAL at 11:31

## 2018-02-27 RX ADMIN — FAMOTIDINE 20 MG: 20 TABLET, FILM COATED ORAL at 11:31

## 2018-02-27 ASSESSMENT — ENCOUNTER SYMPTOMS
VOMITING: 0
SHORTNESS OF BREATH: 0
SINUS PAIN: 0
PHOTOPHOBIA: 0
COUGH: 0
NAUSEA: 0
DIARRHEA: 0
ABDOMINAL PAIN: 1
BLOOD IN STOOL: 0
ABDOMINAL DISTENTION: 0
ANAL BLEEDING: 0

## 2018-02-27 NOTE — ED PROVIDER NOTES
[facility]  ED  Emergency Department Encounter  Emergency Medicine Resident     Pt Name: Hernandez Rosario  MRN: [id number]  Adonaygfsurinder 1955  Date of evaluation: 18  PCP:  Pete Carlisle       Chief Complaint   Patient presents with    Abdominal Pain     pt reports right side abd pain intermittent, pt denies pain currently, pt reports this is an ongoing pain, pt reports intermittent nasuea. HISTORY OF PRESENT ILLNESS  (Location/Symptom, Timing/Onset, Context/Setting, Quality, Duration, Modifying Factors, Severity.)      Emi Daniels is a 57 y.o. female who presents with chronic abdominal pain. Patient reports was seen at her family doctor last week and had a outpatient CT scheduled. Patient states she has not been able to get that performed yet. States she has right upper quadrant pain, it is sharp and stabbing. Intermittent. Patient reports it is not related to food. Denies nausea, vomiting, fevers, chills. Reports had C in the past, but states that she \"does not have it anymore. \" Hx of appendicitis s/p removal.    PAST MEDICAL / SURGICAL / SOCIAL / FAMILY HISTORY      has a past medical history of Chronic rhinitis; Cigarette smoker; Depression; GERD (gastroesophageal reflux disease); Hepatitis C, chronic ([facility] [address] Hypertension; Migraine; Multiple transfusions; Osteoarthritis; Scoliosis; and Substance abuse.      has a past surgical history that includes  section; LEEP (); Upper gastrointestinal endoscopy (); Gastric bypass surgery; Appendectomy (2017); and laparoscopic appendectomy (N/A, 3/27/2017). Social History     Social History    Marital status: Single     Spouse name: N/A    Number of children: N/A    Years of education: N/A     Occupational History    Not on file.      Social History Main Topics    Smoking status: Former Smoker     Packs/day: 0.25     Types: Cigarettes    Smokeless tobacco: Never Used    Alcohol use No Comment: past heavy drinker    Drug use: No      Comment: past history    Sexual activity: Not on file     Other Topics Concern    Not on file     Social History Narrative    No narrative on file       No family history on file. Allergies:  Aspirin; Keflet [cephalexin]; and Motrin [ibuprofen]    Home Medications:  Prior to Admission medications    Medication Sig Start Date End Date Taking? Authorizing Provider   acetaminophen (APAP EXTRA STRENGTH) 500 MG tablet Take 2 tablets by mouth every 8 hours as needed for Pain 8/4/17   María Kessler,    albuterol sulfate HFA (PROVENTIL HFA) 108 (90 Base) MCG/ACT inhaler Inhale 1-2 puffs into the lungs every 4 hours as needed for Wheezing or Shortness of Breath (Space out to every 6 hours as symptoms improve) Space out to every 6 hours as symptoms improve. 6/14/17   Bill Reyna MD   amLODIPine (NORVASC) 5 MG tablet  1/17/17   Historical Provider, MD   SUBOXONE 8-2 MG FILM  1/19/17   Historical Provider, MD   lisinopril (PRINIVIL;ZESTRIL) 2.5 MG tablet  1/17/17   Historical Provider, MD   metoprolol succinate (TOPROL XL) 25 MG extended release tablet  1/17/17   Historical Provider, MD       REVIEW OF SYSTEMS    (2-9 systems for level 4, 10 or more for level 5)      Review of Systems   Constitutional: Negative for activity change, appetite change, chills and fatigue. HENT: Negative for congestion and sinus pain. Eyes: Negative for photophobia and visual disturbance. Respiratory: Negative for cough and shortness of breath. Cardiovascular: Negative for chest pain. Gastrointestinal: Positive for abdominal pain. Negative for abdominal distention, anal bleeding, blood in stool, diarrhea, nausea and vomiting. Musculoskeletal: Negative for gait problem, joint swelling, myalgias, neck pain and neck stiffness. Skin: Negative for rash and wound.        PHYSICAL EXAM   (up to 7 for level 4, 8 or more for level 5)      INITIAL VITALS:   BP (!) 163/102

## 2018-02-27 NOTE — ED NOTES
Patient informed a urine sample is needed, pt reports does not have to urinate at this time but informed to urinate in a specimen cup when have to go. Pt verbalized understanding.  Will continue to monitor      Minna Pemberton RN  02/27/18 6925

## 2018-02-28 ASSESSMENT — ENCOUNTER SYMPTOMS
NAUSEA: 0
ABDOMINAL PAIN: 1
VOMITING: 0

## 2018-03-01 ENCOUNTER — HOSPITAL ENCOUNTER (OUTPATIENT)
Age: 63
Setting detail: SPECIMEN
Discharge: HOME | End: 2018-03-01
Payer: MEDICARE

## 2018-03-05 LAB
HPV SAMPLE: ABNORMAL
HPV SOURCE: ABNORMAL
HPV, GENOTYPE 16: NOT DETECTED
HPV, GENOTYPE 18: NOT DETECTED
HPV, HIGH RISK OTHER: DETECTED
HPV, INTERPRETATION: ABNORMAL

## 2018-03-12 LAB — CYTOLOGY REPORT: NORMAL

## 2018-03-19 ENCOUNTER — HOSPITAL ENCOUNTER (OUTPATIENT)
Age: 63
Discharge: HOME | End: 2018-03-19
Payer: MEDICARE

## 2018-03-19 ENCOUNTER — HOSPITAL ENCOUNTER (OUTPATIENT)
Dept: CT IMAGING | Age: 63
Discharge: HOME | End: 2018-03-21
Payer: MEDICARE

## 2018-03-19 DIAGNOSIS — R10.84 GENERALIZED ABDOMINAL PAIN: ICD-10-CM

## 2018-03-19 LAB
BUN BLDV-MCNC: 8 MG/DL (ref 8–23)
CREAT SERPL-MCNC: 0.51 MG/DL (ref 0.5–0.9)
GFR AFRICAN AMERICAN: >60 ML/MIN
GFR NON-AFRICAN AMERICAN: >60 ML/MIN
GFR SERPL CREATININE-BSD FRML MDRD: NORMAL ML/MIN/{1.73_M2}
GFR SERPL CREATININE-BSD FRML MDRD: NORMAL ML/MIN/{1.73_M2}

## 2018-03-19 PROCEDURE — 82565 ASSAY OF CREATININE: CPT

## 2018-03-19 PROCEDURE — 84520 ASSAY OF UREA NITROGEN: CPT

## 2018-03-26 ENCOUNTER — HOSPITAL ENCOUNTER (OUTPATIENT)
Dept: CT IMAGING | Age: 63
Discharge: HOME | End: 2018-03-28
Payer: MEDICARE

## 2018-03-26 DIAGNOSIS — R93.5 ABNORMAL ULTRASOUND OF ABDOMEN: ICD-10-CM

## 2018-03-26 DIAGNOSIS — R10.13 EPIGASTRIC PAIN: ICD-10-CM

## 2018-03-26 PROCEDURE — 6360000004 HC RX CONTRAST MEDICATION: Performed by: REGISTERED NURSE

## 2018-03-26 PROCEDURE — 74170 CT ABD WO CNTRST FLWD CNTRST: CPT

## 2018-03-26 PROCEDURE — 76937 US GUIDE VASCULAR ACCESS: CPT

## 2018-03-26 RX ADMIN — IOPAMIDOL 75 ML: 755 INJECTION, SOLUTION INTRAVENOUS at 09:56

## 2018-05-12 ENCOUNTER — APPOINTMENT (OUTPATIENT)
Dept: GENERAL RADIOLOGY | Age: 63
End: 2018-05-12
Payer: MEDICARE

## 2018-05-12 ENCOUNTER — HOSPITAL ENCOUNTER (EMERGENCY)
Age: 63
Discharge: HOME | End: 2018-05-12
Attending: EMERGENCY MEDICINE
Payer: MEDICARE

## 2018-05-12 VITALS
WEIGHT: 103 LBS | RESPIRATION RATE: 18 BRPM | BODY MASS INDEX: 20.76 KG/M2 | DIASTOLIC BLOOD PRESSURE: 103 MMHG | OXYGEN SATURATION: 98 % | HEIGHT: 59 IN | TEMPERATURE: 97.7 F | SYSTOLIC BLOOD PRESSURE: 160 MMHG | HEART RATE: 89 BPM

## 2018-05-12 DIAGNOSIS — R07.9 CHEST PAIN, UNSPECIFIED TYPE: Primary | ICD-10-CM

## 2018-05-12 LAB
ABSOLUTE EOS #: 0.34 K/UL (ref 0–0.44)
ABSOLUTE IMMATURE GRANULOCYTE: 0.03 K/UL (ref 0–0.3)
ABSOLUTE LYMPH #: 2.25 K/UL (ref 1.1–3.7)
ABSOLUTE MONO #: 0.93 K/UL (ref 0.1–1.2)
ANION GAP SERPL CALCULATED.3IONS-SCNC: 10 MMOL/L (ref 9–17)
BASOPHILS # BLD: 0 % (ref 0–2)
BASOPHILS ABSOLUTE: <0.03 K/UL (ref 0–0.2)
BUN BLDV-MCNC: 5 MG/DL (ref 8–23)
BUN/CREAT BLD: ABNORMAL (ref 9–20)
CALCIUM SERPL-MCNC: 9 MG/DL (ref 8.6–10.4)
CHLORIDE BLD-SCNC: 101 MMOL/L (ref 98–107)
CO2: 27 MMOL/L (ref 20–31)
CREAT SERPL-MCNC: 0.51 MG/DL (ref 0.5–0.9)
DIFFERENTIAL TYPE: ABNORMAL
EKG ATRIAL RATE: 100 BPM
EKG P AXIS: 54 DEGREES
EKG P-R INTERVAL: 182 MS
EKG Q-T INTERVAL: 362 MS
EKG QRS DURATION: 70 MS
EKG QTC CALCULATION (BAZETT): 466 MS
EKG R AXIS: 20 DEGREES
EKG T AXIS: 53 DEGREES
EKG VENTRICULAR RATE: 100 BPM
EOSINOPHILS RELATIVE PERCENT: 5 % (ref 1–4)
GFR AFRICAN AMERICAN: >60 ML/MIN
GFR NON-AFRICAN AMERICAN: >60 ML/MIN
GFR SERPL CREATININE-BSD FRML MDRD: ABNORMAL ML/MIN/{1.73_M2}
GFR SERPL CREATININE-BSD FRML MDRD: ABNORMAL ML/MIN/{1.73_M2}
GLUCOSE BLD-MCNC: 89 MG/DL (ref 70–99)
HCT VFR BLD CALC: 47.2 % (ref 36.3–47.1)
HEMOGLOBIN: 14.5 G/DL (ref 11.9–15.1)
IMMATURE GRANULOCYTES: 1 %
LYMPHOCYTES # BLD: 35 % (ref 24–43)
MCH RBC QN AUTO: 30.7 PG (ref 25.2–33.5)
MCHC RBC AUTO-ENTMCNC: 30.7 G/DL (ref 28.4–34.8)
MCV RBC AUTO: 100 FL (ref 82.6–102.9)
MONOCYTES # BLD: 14 % (ref 3–12)
NRBC AUTOMATED: 0 PER 100 WBC
PDW BLD-RTO: 14 % (ref 11.8–14.4)
PLATELET # BLD: 304 K/UL (ref 138–453)
PLATELET ESTIMATE: ABNORMAL
PMV BLD AUTO: 9.5 FL (ref 8.1–13.5)
POC TROPONIN I: 0 NG/ML (ref 0–0.1)
POC TROPONIN INTERP: NORMAL
POTASSIUM SERPL-SCNC: 3.9 MMOL/L (ref 3.7–5.3)
RBC # BLD: 4.72 M/UL (ref 3.95–5.11)
RBC # BLD: ABNORMAL 10*6/UL
SEG NEUTROPHILS: 45 % (ref 36–65)
SEGMENTED NEUTROPHILS ABSOLUTE COUNT: 2.91 K/UL (ref 1.5–8.1)
SODIUM BLD-SCNC: 138 MMOL/L (ref 135–144)
WBC # BLD: 6.5 K/UL (ref 3.5–11.3)
WBC # BLD: ABNORMAL 10*3/UL

## 2018-05-12 PROCEDURE — 99284 EMERGENCY DEPT VISIT MOD MDM: CPT

## 2018-05-12 PROCEDURE — 6370000000 HC RX 637 (ALT 250 FOR IP): Performed by: EMERGENCY MEDICINE

## 2018-05-12 PROCEDURE — 71046 X-RAY EXAM CHEST 2 VIEWS: CPT

## 2018-05-12 PROCEDURE — 84484 ASSAY OF TROPONIN QUANT: CPT

## 2018-05-12 PROCEDURE — 80048 BASIC METABOLIC PNL TOTAL CA: CPT

## 2018-05-12 PROCEDURE — 85025 COMPLETE CBC W/AUTO DIFF WBC: CPT

## 2018-05-12 PROCEDURE — 93005 ELECTROCARDIOGRAM TRACING: CPT

## 2018-05-12 RX ORDER — METOPROLOL SUCCINATE 25 MG/1
25 TABLET, EXTENDED RELEASE ORAL DAILY
Status: DISCONTINUED | OUTPATIENT
Start: 2018-05-12 | End: 2018-05-12 | Stop reason: HOSPADM

## 2018-05-12 RX ORDER — ACETAMINOPHEN 325 MG/1
650 TABLET ORAL ONCE
Status: DISCONTINUED | OUTPATIENT
Start: 2018-05-12 | End: 2018-05-12

## 2018-05-12 RX ORDER — LISINOPRIL 2.5 MG/1
2.5 TABLET ORAL DAILY
Status: DISCONTINUED | OUTPATIENT
Start: 2018-05-12 | End: 2018-05-12 | Stop reason: HOSPADM

## 2018-05-12 RX ORDER — ASPIRIN 81 MG/1
324 TABLET, CHEWABLE ORAL ONCE
Status: COMPLETED | OUTPATIENT
Start: 2018-05-12 | End: 2018-05-12

## 2018-05-12 RX ADMIN — ASPIRIN 81 MG 324 MG: 81 TABLET ORAL at 12:53

## 2018-05-12 ASSESSMENT — ENCOUNTER SYMPTOMS
CONSTIPATION: 0
EYE PAIN: 0
RHINORRHEA: 0
ABDOMINAL PAIN: 0
COUGH: 0
SHORTNESS OF BREATH: 0
DIARRHEA: 0
NAUSEA: 0
VOMITING: 0

## 2018-05-12 ASSESSMENT — PAIN DESCRIPTION - LOCATION: LOCATION: SHOULDER

## 2018-05-12 ASSESSMENT — PAIN DESCRIPTION - DESCRIPTORS: DESCRIPTORS: ACHING;PRESSURE

## 2018-05-12 ASSESSMENT — PAIN DESCRIPTION - ORIENTATION: ORIENTATION: RIGHT

## 2018-05-12 ASSESSMENT — PAIN SCALES - GENERAL: PAINLEVEL_OUTOF10: 4

## 2018-05-23 ENCOUNTER — HOSPITAL ENCOUNTER (OUTPATIENT)
Dept: PHYSICAL THERAPY | Age: 63
Setting detail: THERAPIES SERIES
Discharge: HOME | End: 2018-05-23
Payer: MEDICARE

## 2018-05-23 PROCEDURE — G8978 MOBILITY CURRENT STATUS: HCPCS

## 2018-05-23 PROCEDURE — G8979 MOBILITY GOAL STATUS: HCPCS

## 2018-05-23 PROCEDURE — 97161 PT EVAL LOW COMPLEX 20 MIN: CPT

## 2018-05-30 ENCOUNTER — HOSPITAL ENCOUNTER (OUTPATIENT)
Dept: PHYSICAL THERAPY | Age: 63
Setting detail: THERAPIES SERIES
Discharge: HOME | End: 2018-05-30
Payer: MEDICARE

## 2018-06-05 ENCOUNTER — HOSPITAL ENCOUNTER (EMERGENCY)
Age: 63
Discharge: HOME | End: 2018-06-05
Attending: EMERGENCY MEDICINE
Payer: MEDICARE

## 2018-06-05 VITALS
RESPIRATION RATE: 14 BRPM | HEART RATE: 112 BPM | BODY MASS INDEX: 21.41 KG/M2 | OXYGEN SATURATION: 95 % | SYSTOLIC BLOOD PRESSURE: 145 MMHG | DIASTOLIC BLOOD PRESSURE: 88 MMHG | WEIGHT: 106 LBS | TEMPERATURE: 98.6 F

## 2018-06-05 DIAGNOSIS — R14.0 ABDOMINAL DISTENSION: Primary | ICD-10-CM

## 2018-06-05 LAB
ABSOLUTE EOS #: 0.12 K/UL (ref 0–0.44)
ABSOLUTE IMMATURE GRANULOCYTE: <0.03 K/UL (ref 0–0.3)
ABSOLUTE LYMPH #: 2.62 K/UL (ref 1.1–3.7)
ABSOLUTE MONO #: 0.64 K/UL (ref 0.1–1.2)
ALBUMIN SERPL-MCNC: 4.2 G/DL (ref 3.5–5.2)
ALBUMIN/GLOBULIN RATIO: 1 (ref 1–2.5)
ALP BLD-CCNC: 95 U/L (ref 35–104)
ALT SERPL-CCNC: 25 U/L (ref 5–33)
ANION GAP SERPL CALCULATED.3IONS-SCNC: 11 MMOL/L (ref 9–17)
AST SERPL-CCNC: 49 U/L
BASOPHILS # BLD: 0 % (ref 0–2)
BASOPHILS ABSOLUTE: <0.03 K/UL (ref 0–0.2)
BILIRUB SERPL-MCNC: 0.52 MG/DL (ref 0.3–1.2)
BILIRUBIN DIRECT: 0.23 MG/DL
BILIRUBIN, INDIRECT: 0.29 MG/DL (ref 0–1)
BUN BLDV-MCNC: 5 MG/DL (ref 8–23)
BUN/CREAT BLD: ABNORMAL (ref 9–20)
CALCIUM SERPL-MCNC: 8.3 MG/DL (ref 8.6–10.4)
CHLORIDE BLD-SCNC: 105 MMOL/L (ref 98–107)
CO2: 25 MMOL/L (ref 20–31)
CREAT SERPL-MCNC: 0.54 MG/DL (ref 0.5–0.9)
DIFFERENTIAL TYPE: ABNORMAL
EOSINOPHILS RELATIVE PERCENT: 2 % (ref 1–4)
GFR AFRICAN AMERICAN: >60 ML/MIN
GFR NON-AFRICAN AMERICAN: >60 ML/MIN
GFR SERPL CREATININE-BSD FRML MDRD: ABNORMAL ML/MIN/{1.73_M2}
GFR SERPL CREATININE-BSD FRML MDRD: ABNORMAL ML/MIN/{1.73_M2}
GLOBULIN: ABNORMAL G/DL (ref 1.5–3.8)
GLUCOSE BLD-MCNC: 98 MG/DL (ref 70–99)
HCT VFR BLD CALC: 48.6 % (ref 36.3–47.1)
HEMOGLOBIN: 14.9 G/DL (ref 11.9–15.1)
IMMATURE GRANULOCYTES: 0 %
INR BLD: 0.9
LIPASE: 37 U/L (ref 13–60)
LYMPHOCYTES # BLD: 52 % (ref 24–43)
MCH RBC QN AUTO: 30.7 PG (ref 25.2–33.5)
MCHC RBC AUTO-ENTMCNC: 30.7 G/DL (ref 28.4–34.8)
MCV RBC AUTO: 100 FL (ref 82.6–102.9)
MONOCYTES # BLD: 13 % (ref 3–12)
NRBC AUTOMATED: 0 PER 100 WBC
PARTIAL THROMBOPLASTIN TIME: 27.2 SEC (ref 20.5–30.5)
PDW BLD-RTO: 13.3 % (ref 11.8–14.4)
PLATELET # BLD: 331 K/UL (ref 138–453)
PLATELET ESTIMATE: ABNORMAL
PMV BLD AUTO: 9.4 FL (ref 8.1–13.5)
POTASSIUM SERPL-SCNC: 3.7 MMOL/L (ref 3.7–5.3)
PROTHROMBIN TIME: 10.1 SEC (ref 9–12)
RBC # BLD: 4.86 M/UL (ref 3.95–5.11)
RBC # BLD: ABNORMAL 10*6/UL
SEG NEUTROPHILS: 33 % (ref 36–65)
SEGMENTED NEUTROPHILS ABSOLUTE COUNT: 1.7 K/UL (ref 1.5–8.1)
SODIUM BLD-SCNC: 141 MMOL/L (ref 135–144)
TOTAL PROTEIN: 8.4 G/DL (ref 6.4–8.3)
WBC # BLD: 5.1 K/UL (ref 3.5–11.3)
WBC # BLD: ABNORMAL 10*3/UL

## 2018-06-05 PROCEDURE — 80048 BASIC METABOLIC PNL TOTAL CA: CPT

## 2018-06-05 PROCEDURE — 85025 COMPLETE CBC W/AUTO DIFF WBC: CPT

## 2018-06-05 PROCEDURE — 85610 PROTHROMBIN TIME: CPT

## 2018-06-05 PROCEDURE — 80076 HEPATIC FUNCTION PANEL: CPT

## 2018-06-05 PROCEDURE — 83690 ASSAY OF LIPASE: CPT

## 2018-06-05 PROCEDURE — 85730 THROMBOPLASTIN TIME PARTIAL: CPT

## 2018-06-05 PROCEDURE — 99284 EMERGENCY DEPT VISIT MOD MDM: CPT

## 2018-06-05 ASSESSMENT — ENCOUNTER SYMPTOMS
BLOOD IN STOOL: 0
ABDOMINAL DISTENTION: 1
COUGH: 0
NAUSEA: 0
SORE THROAT: 0
CONSTIPATION: 0
ABDOMINAL PAIN: 0
SHORTNESS OF BREATH: 0
ANAL BLEEDING: 0
VOMITING: 0
EYE DISCHARGE: 0
DIARRHEA: 0

## 2018-06-06 ENCOUNTER — HOSPITAL ENCOUNTER (OUTPATIENT)
Dept: PHYSICAL THERAPY | Age: 63
Setting detail: THERAPIES SERIES
Discharge: HOME | End: 2018-06-06
Payer: MEDICARE

## 2018-06-06 PROCEDURE — 97110 THERAPEUTIC EXERCISES: CPT

## 2018-06-12 ENCOUNTER — HOSPITAL ENCOUNTER (OUTPATIENT)
Dept: PHYSICAL THERAPY | Age: 63
Setting detail: THERAPIES SERIES
Discharge: HOME | End: 2018-06-12
Payer: MEDICARE

## 2018-06-12 PROCEDURE — 97110 THERAPEUTIC EXERCISES: CPT

## 2018-06-15 ENCOUNTER — HOSPITAL ENCOUNTER (OUTPATIENT)
Dept: PHYSICAL THERAPY | Age: 63
Setting detail: THERAPIES SERIES
Discharge: HOME | End: 2018-06-15
Payer: MEDICARE

## 2018-06-15 PROCEDURE — 97110 THERAPEUTIC EXERCISES: CPT

## 2018-06-18 ENCOUNTER — HOSPITAL ENCOUNTER (OUTPATIENT)
Dept: PHYSICAL THERAPY | Age: 63
Setting detail: THERAPIES SERIES
Discharge: HOME | End: 2018-06-18
Payer: MEDICARE

## 2018-06-18 PROCEDURE — 97110 THERAPEUTIC EXERCISES: CPT

## 2018-06-22 ENCOUNTER — HOSPITAL ENCOUNTER (OUTPATIENT)
Dept: PHYSICAL THERAPY | Age: 63
Setting detail: THERAPIES SERIES
Discharge: HOME | End: 2018-06-22
Payer: MEDICARE

## 2018-06-22 PROCEDURE — 97110 THERAPEUTIC EXERCISES: CPT

## 2018-06-25 ENCOUNTER — APPOINTMENT (OUTPATIENT)
Dept: PHYSICAL THERAPY | Age: 63
End: 2018-06-25
Payer: MEDICARE

## 2018-06-28 ENCOUNTER — HOSPITAL ENCOUNTER (OUTPATIENT)
Dept: PHYSICAL THERAPY | Age: 63
Setting detail: THERAPIES SERIES
Discharge: HOME | End: 2018-06-28
Payer: MEDICARE

## 2018-06-28 PROCEDURE — 97110 THERAPEUTIC EXERCISES: CPT

## 2018-07-02 ENCOUNTER — APPOINTMENT (OUTPATIENT)
Dept: PHYSICAL THERAPY | Age: 63
End: 2018-07-02
Payer: MEDICARE

## 2018-07-05 ENCOUNTER — HOSPITAL ENCOUNTER (OUTPATIENT)
Dept: PHYSICAL THERAPY | Age: 63
Setting detail: THERAPIES SERIES
Discharge: HOME | End: 2018-07-05
Payer: MEDICARE

## 2018-07-05 PROCEDURE — 97110 THERAPEUTIC EXERCISES: CPT

## 2018-07-11 ENCOUNTER — HOSPITAL ENCOUNTER (OUTPATIENT)
Dept: PHYSICAL THERAPY | Age: 63
Setting detail: THERAPIES SERIES
Discharge: HOME | End: 2018-07-11
Payer: MEDICARE

## 2018-07-11 PROCEDURE — 97110 THERAPEUTIC EXERCISES: CPT

## 2018-07-11 NOTE — FLOWSHEET NOTE
Patient treated and note written by Song Grady, Student Physical Therapist Assistant under supervision of , Summer Conroy PTA.

## 2018-07-12 ENCOUNTER — HOSPITAL ENCOUNTER (OUTPATIENT)
Dept: PHYSICAL THERAPY | Age: 63
Setting detail: THERAPIES SERIES
Discharge: HOME | End: 2018-07-12
Payer: MEDICARE

## 2018-07-12 PROCEDURE — G8979 MOBILITY GOAL STATUS: HCPCS

## 2018-07-12 PROCEDURE — G8978 MOBILITY CURRENT STATUS: HCPCS

## 2018-07-12 PROCEDURE — 97110 THERAPEUTIC EXERCISES: CPT

## 2018-07-12 NOTE — FLOWSHEET NOTE
6 treatments)  1. ? Pain: MET 06/22/18  2. ? ROM:core 60 ° rotation bilateral and 70 ° of forward flexion MET 06/22/18  3. ? Strength:core 5-/5 strength of the abdominal muscles and 4+/5 strength of the low back NOT MET 06/22/18  (ABS 3-/5, BACK 4-/5)  4. ? Function:patient will have an Oswestry score of 40% or less impairment NOT MET 06/22/18 (52% impairment)  5. Independent with Home Exercise Programs MET 06/22/18  6. Demonstrate Knowledge of fall prevention MET 06/22/18    Long term goals to be met by visit  12  1. Patient will be able to demonstrate good body mechanics going from supine to sit and sit to supine (goal met)   2. Patient will meet her Oswestry goal listed above (goal met)   3. Patient will meet her core strength values noted above (progress made, but patient will need more time to complete this goal)  Long term goal to be met by visit 18:  1. Patient will report increased ability to stand with an Oswestry score of 3 or less (current score is to stand for 10 minutes)  2. Patientw ill report that she can wlk for 1 mile without increasing pain (a score of 2 or less on the Oswestry)     Patient goals:to get better - to be able to walk further - patient states she does not feel that she can comfortable walk 4 blocks--goal met states she can  walk about 4 blocks. - patient states that she would like to walk about a mile so she can get to and from the store     G-CODE     Functional Limitation: Basic Mobiity  Functional Assessment Used: Oswestry low back Pain Scale (34% impairment)  Current Status Modifier: cj  Goal Status Modifier: ci  Discharge Status Modifier:      Pt. Education:  [x] Yes  [] No  [x] Reviewed Prior home exercise program/education/instructions  Method of Education: [x] Verbal  [x] Demo  [x] Written  Comprehension of Education:  [] Verbalizes understanding. [] Demonstrates understanding. [x] Needs review.   [] Demonstrates/verbalizes home exercise program/education/instructions

## 2018-07-18 ENCOUNTER — APPOINTMENT (OUTPATIENT)
Dept: PHYSICAL THERAPY | Age: 63
End: 2018-07-18
Payer: MEDICARE

## 2018-07-19 ENCOUNTER — HOSPITAL ENCOUNTER (OUTPATIENT)
Dept: PHYSICAL THERAPY | Age: 63
Setting detail: THERAPIES SERIES
Discharge: HOME | End: 2018-07-19
Payer: MEDICARE

## 2018-07-19 PROCEDURE — 97110 THERAPEUTIC EXERCISES: CPT

## 2018-07-19 NOTE — FLOWSHEET NOTE
lizzie  [x] Caity Phi       Outpatient Physical        Therapy       [address]       Phone: [phone]       Fax: [phone] [] [facility] at [address]       Phone: [phone]       Fax: [phone] [] [facility]  [address] [place] [address] [facility]  [address] [place] [address]   Phone: [phone]   Fax:  [phone]     Physical Therapy Daily Treatment Note    Date:  2018  Patient Name:  Sarahy Gloria    :  1955  MRN: [id number]   Physician: Denise Argueta MD                             Insurance: [employer]  Medical Diagnosis: scoliosis(M41.9)                        Rehab Codes: acquired scoliosis (M41.9); low back pain (M54.5); muscle weakness (M62.81); abnormal posture (R29.3); spasms of muscle (M62.830)  Onset Date: ~2016               Next 's appt.: Cynthia 2 months from 2018)   Visit# / total visits: 12(additional 12 visits(6 weeks) referred from Dr. Ashok Pizano)  Cancels/No Shows: 3/0  G-codes at visit 20     Subjective:    Pain:  [] Yes  [x] No Location:  LB Pain Rating: (0-10 scale) 0/10  Pain altered Tx:  [x] No  [] Yes  Action:   Comments:  patient reports that she can walk three to four blocks, states she walks for [facility] [place]  Objective:  Modalities: HP as needed   Modalities:   Precautions:patient has osteoporosis with a Z score of -2.0  Exercises:  Exercise Reps/ Time Weight/ Level Comments   sci fit   L2          Supine      HS stretch suzanne   w/belt    LTR 5x5\"   with contralateral arm stretch    iso abdom home       iso abom with  reps       iso abdom w/ alt UE 15 reps 2 bs UE Reviewed with patient; minimal verbal cues      iso abdom w/ alt same UE/LE  2 lbs UE Reviewed with patient; tactile cueing during exercise held   iso abdom w/ alt opp UE/LE  2 lbs UE Reviewed with patient; tactile cueing during exercise held   Hip add     w/ball     Clamshells yellow     bridges 15x3\"  w/ball iso hip add     SLR  15 reps ea  suzanne     SL thoracic stretch over bolster. education for home,          quadraped      Prayer stretch 3x30\" [address] and to R   Cat camel             Heel slides bilateral with orange slide tube 10 reps      L LE LTR stretch w/ contra lateral UE overhead 5 x 15 seconds  Supine , LTR w/ L knee bent and hip flexed to R side w/ R overhead  arm stretch   Standing      R Side bend L UE overhead torso stretch 3x30\"   Reviewed with patient; verbal cues needed throughout   FW  L shldr flexion  10 reps AROM Reviewed with patient     Hip abd, hip ext suzanne  10 reps each 1 lbs     3 way hip tband suzanne    yellow    shldr ext  yellow    tband rows   20 reps yellow  Reviewed with patient; .demo prior to first repetition   tband ER bilateral  20 reps each  yellow Reviewed with patient; demo prior to first repetition     R shldr punch   20 reps yellow Reviewed with patient; demo prior to first repetition   Hip Hike  AROM R side,Reviewed with patient    shoulder flexion 20 reps  AROM Reviewed with patient             Other:   Oswestry score 34% impairment (competed 7/12/2018)  Specific Instructions for next treatment   Strengthening for the spine most especially the low back with consideration given to the upper back where patient has scoliosis with right thoracic primary curve. Progress stretching as tolerated. Treatment Charges: Mins Units   []  Modalities     [x]  Ther Exercise 43 3   []  Manual Therapy     []  Ther Activities     []  Aquatics     []  Vasocompression     []  Other     Total Treatment time 43 3       Assessment: [x] Progressing toward goals: Moderate cueing for all ex's for recall and technique. Tolerated stretches fairly w/ some cramping in L thigh and pain in LB w/ incr ROM. Several exercises held today to focus on educating pt on proper technique and importance of stretching correctly.  Continued need for skilled therapy to manage pain, incr understanding. [x] Needs review. [] Demonstrates/verbalizes home exercise program/education/instructions previously given. 6/6/18: iso abdom, Iso abom w/ march. SLR. Standing hip abd/ext. Tband Yellow band issued  ER, rows. 6/15/18: tband chest press. 06/18/18:  Stressed the importance of following HEP daily. Re-issued written copy: Upper back streches, HS stretch, SL thoracic stretch, torso twist, and punches w/ band. 06/22/18: Stressed importance of HEP and fall prevention tips. 07/11/18: Instructed pt on proper way to position towel roll while sidelying and completing stretches. SL thoracic stretch daily and before bed. Plan: [x] Continue per plan of care. [] Other:      Time In: 0901    Time Out: 8019    Electronically signed by:   Cherylene Champion, PT

## 2018-07-23 ENCOUNTER — APPOINTMENT (OUTPATIENT)
Dept: PHYSICAL THERAPY | Age: 63
End: 2018-07-23
Payer: MEDICARE

## 2018-07-25 ENCOUNTER — HOSPITAL ENCOUNTER (OUTPATIENT)
Dept: PHYSICAL THERAPY | Age: 63
Setting detail: THERAPIES SERIES
Discharge: HOME | End: 2018-07-25
Payer: MEDICARE

## 2018-07-25 PROCEDURE — 97110 THERAPEUTIC EXERCISES: CPT

## 2018-07-25 NOTE — FLOWSHEET NOTE
treatments)  1. ? Pain: MET 06/22/18  2. ? ROM:core 60 ° rotation bilateral and 70 ° of forward flexion MET 06/22/18  3. ? Strength:core 5-/5 strength of the abdominal muscles and 4+/5 strength of the low back NOT MET 06/22/18  (ABS 3-/5, BACK 4-/5)  4. ? Function:patient will have an Oswestry score of 40% or less impairment NOT MET 06/22/18 (52% impairment)  5. Independent with Home Exercise Programs MET 06/22/18  6. Demonstrate Knowledge of fall prevention MET 06/22/18    Long term goals to be met by visit  12  1. Patient will be able to demonstrate good body mechanics going from supine to sit and sit to supine (goal met)   2. Patient will meet her Oswestry goal listed above (goal met)   3. Patient will meet her core strength values noted above (progress made, but patient will need more time to complete this goal)  Long term goal to be met by visit 18:  1. Patient will report increased ability to stand with an Oswestry score of 3 or less (current score is to stand for 10 minutes)  2. Patient will report that she can walk for 1 mile without increasing pain (a score of 2 or less on the Oswestry) (progress made)     Patient goals:to get better - to be able to walk further - patient states she does not feel that she can comfortable walk 4 blocks--goal met states she can  walk about 4 blocks. - patient states that she would like to walk about a mile so she can get to and from the store     G-CODE     Functional Limitation: Basic Mobiity  Functional Assessment Used: Oswestry low back Pain Scale (34% impairment)  Current Status Modifier: cj  Goal Status Modifier: ci  Discharge Status Modifier:      Pt. Education:  [x] Yes  [] No  [x] Reviewed Prior home exercise program/education/instructions  Method of Education: [x] Verbal  [x] Demo  [x] Written  Comprehension of Education:  [] Verbalizes understanding. [] Demonstrates understanding. [x] Needs review.   [] Demonstrates/verbalizes home exercise program/education/instructions previously given. 6/6/18: iso abdom, Iso abom w/ march. SLR. Standing hip abd/ext. Tband Yellow band issued  ER, rows. 6/15/18: tband chest press. 06/18/18:  Stressed the importance of following HEP daily. Re-issued written copy: Upper back streches, HS stretch, SL thoracic stretch, torso twist, and punches w/ band. 06/22/18: Stressed importance of HEP and fall prevention tips. 07/11/18: Instructed pt on proper way to position towel roll while sidelying and completing stretches. SL thoracic stretch daily and before bed. Plan: [x] Continue per plan of care.    [] Other:      Time In: 4406    Time Out: 5051    Electronically signed by:  Lucrecia Kelley PTA

## 2018-07-27 ENCOUNTER — HOSPITAL ENCOUNTER (OUTPATIENT)
Dept: MAMMOGRAPHY | Age: 63
Discharge: HOME | End: 2018-07-29
Payer: MEDICARE

## 2018-07-27 ENCOUNTER — HOSPITAL ENCOUNTER (OUTPATIENT)
Dept: PHYSICAL THERAPY | Age: 63
Setting detail: THERAPIES SERIES
Discharge: HOME | End: 2018-07-27
Payer: MEDICARE

## 2018-07-27 DIAGNOSIS — Z12.39 SCREENING BREAST EXAMINATION: ICD-10-CM

## 2018-07-27 PROCEDURE — 77067 SCR MAMMO BI INCL CAD: CPT

## 2018-07-27 PROCEDURE — 97110 THERAPEUTIC EXERCISES: CPT

## 2018-07-27 NOTE — FLOWSHEET NOTE
alt opp UE/LE 15x 2 lbs UE Reviewed with patient; tactile cueing during exercise held x   Hip add     w/ball   x   Clamshells   yellow      bridges 15x3\"  w/ball iso hip add   x   SLR  15 reps ea  suzanne      SL thoracic stretch over bolster. education for home,            quadraped       Prayer stretch 3x30\" Fitjabraut 10 and to R x   Cat camel 10x              Heel slides bilateral with orange slide tube 10 reps       L LE LTR stretch w/ contra lateral UE overhead 5 x 15 seconds  Supine , LTR w/ L knee bent and hip flexed to R side w/ R overhead  arm stretch    Standing       R Side bend L UE overhead torso stretch 3x30\"   Reviewed with patient; verbal cues needed throughout x   FW  L shldr flexion  10 reps AROM Reviewed with patient      Hip abd, hip ext suzanne  10 reps each 1 lbs   x   3 way hip tband suzanne  10 yellow  x   shldr ext 15 reps red Increased band, decreased reps 7/25 x   tband rows   15 reps red  \" x   tband ER bilateral  15 reps each  red \" x    R shldr punch   15 reps red  x   Hip Hike  AROM R side,Reviewed with patient     shoulder flexion 20 reps  AROM Reviewed with patient x              Other:   Oswestry score 34% impairment (competed 7/12/2018)  Specific Instructions for next treatment   Strengthening for the spine most especially the low back with consideration given to the upper back where patient has scoliosis with right thoracic primary curve. Progress stretching as tolerated. Treatment Charges: Mins Units   []  Modalities     [x]  Ther Exercise 41 3   []  Manual Therapy     []  Ther Activities     []  Aquatics     []  Vasocompression     []  Other     Total Treatment time 41 3       Assessment: [x] Progressing toward goals: Patient had fair tolerance for treatment this date with lack of energy due to pain from her migraine. Able to complete all reps of exercises asked of her. Minimal verbal cues needed for upright posture during 3 way hip and proper technique for stretches performed.       [] Demonstrates/verbalizes home exercise program/education/instructions previously given. 6/6/18: iso abdom, Iso abom w/ march. SLR. Standing hip abd/ext. Tband Yellow band issued  ER, rows. 6/15/18: tband chest press. 06/18/18:  Stressed the importance of following HEP daily. Re-issued written copy: Upper back streches, HS stretch, SL thoracic stretch, torso twist, and punches w/ band. 06/22/18: Stressed importance of HEP and fall prevention tips. 07/11/18: Instructed pt on proper way to position towel roll while sidelying and completing stretches. SL thoracic stretch daily and before bed. Plan: [x] Continue per plan of care.    [] Other:      Time In: 860 AM   Time Out: 423 AM     Electronically signed by:  Marva Felix PTA

## 2018-08-01 ENCOUNTER — HOSPITAL ENCOUNTER (OUTPATIENT)
Dept: PHYSICAL THERAPY | Age: 63
Setting detail: THERAPIES SERIES
Discharge: HOME | End: 2018-08-01
Payer: MEDICARE

## 2018-08-01 PROCEDURE — 97110 THERAPEUTIC EXERCISES: CPT

## 2018-08-01 NOTE — FLOWSHEET NOTE
Patient tolerated exercises fairly well. No complaints after treatment. [] No change. [] Other:     STG: (to be met in 6 treatments)  1. ? Pain: MET 06/22/18  2. ? ROM:core 60 ° rotation bilateral and 70 ° of forward flexion MET 06/22/18  3. ? Strength:core 5-/5 strength of the abdominal muscles and 4+/5 strength of the low back NOT MET 06/22/18  (ABS 3-/5, BACK 4-/5)  4. ? Function:patient will have an Oswestry score of 40% or less impairment NOT MET 06/22/18 (52% impairment)  5. Independent with Home Exercise Programs MET 06/22/18  6. Demonstrate Knowledge of fall prevention MET 06/22/18    Long term goals to be met by visit  12  1. Patient will be able to demonstrate good body mechanics going from supine to sit and sit to supine (goal met)   2. Patient will meet her Oswestry goal listed above (goal met)   3. Patient will meet her core strength values noted above (progress made, but patient will need more time to complete this goal)  Long term goal to be met by visit 18:  1. Patient will report increased ability to stand with an Oswestry score of 3 or less (current score is to stand for 10 minutes)  2. Patient will report that she can walk for 1 mile without increasing pain (a score of 2 or less on the Oswestry) (progress made)     Patient goals:to get better - to be able to walk further - patient states she does not feel that she can comfortable walk 4 blocks--goal met states she can  walk about 4 blocks.  - patient states that she would like to walk about a mile so she can get to and from the store     G-CODE     Functional Limitation: Basic Mobiity  Functional Assessment Used: Oswestry low back Pain Scale (34% impairment)  Current Status Modifier: cj  Goal Status Modifier: ci  Discharge Status Modifier:      Pt. Education:  [x] Yes  [] No  [x] Reviewed Prior home exercise program/education/instructions  Method of Education: [x] Verbal  [x] Demo  [x] Written  Comprehension of Education:  [] [employer]

## 2018-08-02 ENCOUNTER — APPOINTMENT (OUTPATIENT)
Dept: PHYSICAL THERAPY | Age: 63
End: 2018-08-02
Payer: MEDICARE

## 2018-08-03 ENCOUNTER — HOSPITAL ENCOUNTER (OUTPATIENT)
Dept: PHYSICAL THERAPY | Age: 63
Setting detail: THERAPIES SERIES
Discharge: HOME | End: 2018-08-03
Payer: MEDICARE

## 2018-08-03 PROCEDURE — 97110 THERAPEUTIC EXERCISES: CPT

## 2018-08-03 NOTE — FLOWSHEET NOTE
lizzie  [x] Pacheco Walsh       Outpatient Physical        Therapy       [address]       Phone: [phone]       Fax: [phone] [] [facility] at [address]       Phone: [phone]       Fax: [phone] [] Laura. [address] [place] [address] [facility]  [address] [place] [address]   Phone: [phone]   Fax:  [phone]     Physical Therapy Daily Treatment Note    Date:  8/3/2018  Patient Name:  Omi Bachelor    :  1955  MRN: [id number]   Physician: Lyric Cash MD                             Insurance: [employer]  Medical Diagnosis: scoliosis(M41.9)                        Rehab Codes: acquired scoliosis (M41.9); low back pain (M54.5); muscle weakness (M62.81); abnormal posture (R29.3); spasms of muscle (M62.830)  Onset Date: ~2016               Next 's appt.: Cynthia 2 months from 2018)   Visit# / total visits: (additional 12 visits(6 weeks) referred from Dr. Margie Plascencia -- added 12 addt'l visits to count on )  Cancels/No Shows: 3/0  G-codes at visit 20     Subjective:    Pain:  [] Yes  [x] No Location:  LB Pain Rating: (0-10 scale) 0/10  Pain altered Tx:  [x] No  [] Yes  Action:   Comments: Pt reports concern of increased abdominal swelling with noticeable distention of abdomen. Pt states that she is seeing a physician regarding this problem.   Objective:   Modalities: HP as needed   Modalities:   Precautions:patient has osteoporosis with a Z score of -2.0  Exercises:  Exercise Reps/ Time Weight/ Level Comments    sci fit  6 min L2  X          Supine       HS stretch suzanne 3x20\"  Bilat on Stool X   LTR 5x5\"   with contralateral arm stretch X    iso abdom home       Core Stab w/ LE March x15ea     X   iso abdom w/ alt UE 15 reps 2 lbs UE Reviewed with patient; minimal verbal cues    X   iso abdom w/ alt same UE/LE 15x 2 lbs UE Reviewed with patient; tactile cueing during exercise held X   iso abdom w/ alt opp UE/LE 15x 2 lbs UE Reviewed with patient; tactile cueing during exercise held X   Hip add x20    w/ball  2\" hold X   Clamshells   Red Tband      Bridges 20x3\"  w/ball iso hip add  --increased reps X   SLR  15 reps ea  Verbal cueing for core stability    SL thoracic stretch over bolster. 3mins  education for home,  x          quadraped       Prayer stretch 3x30\" Fitjabraut 10 and to R X   Cat camel 10x              Heel slides bilateral with orange slide tube 10 reps       L LE LTR stretch w/ contra lateral UE overhead 5 x 15 seconds  Supine , LTR w/ L knee bent and hip flexed to R side w/ R overhead  arm stretch    Standing       R Side bend L UE overhead torso stretch 3x30\"   Reviewed with patient; verbal cues needed throughout X   FW  L shldr flexion  10 reps AROM Reviewed with patient      Hip abd, hip ext suzanne  10 reps each 1 lbs   X   3 way hip tband suzanne  15 yellow Increased reps X   shldr ext x25 Green  X   tband rows  x25 Green  X   tband ER bilateral  x20 red  X    R shldr punch   15 reps Red     Hip Hike  AROM R side x    shoulder flexion 20 reps  AROM  X              Manual: Therapist assisted L innominate glides with slight overpressure- 08/03/18    Specific Instructions for next treatment   Strengthening for the spine most especially the low back with consideration given to the upper back where patient has scoliosis with right thoracic primary curve. Progress stretching as tolerated. Treatment Charges: Mins Units   []  Modalities     [x]  Ther Exercise 40 3   [x]  Manual Therapy 5 -   []  Ther Activities     []  Aquatics     []  Vasocompression     []  Other     Total Treatment time 45 3       Assessment: [x] Progressing toward goals: remain consistent with repetitions and resistance this treatment. Incorporated manual L pelvis glides with slight overpressure into ex prog for appropriate postural alignment.  Pt required min Vc for correction of technique and tactile cueing for TA understanding. [] Demonstrates understanding. [x] Needs review. [] Demonstrates/verbalizes home exercise program/education/instructions previously given. 6/6/18: iso abdom, Iso abom w/ march. SLR. Standing hip abd/ext. Tband Yellow band issued  ER, rows. 6/15/18: tband chest press. 06/18/18:  Stressed the importance of following HEP daily. Re-issued written copy: Upper back streches, HS stretch, SL thoracic stretch, torso twist, and punches w/ band. 06/22/18: Stressed importance of HEP and fall prevention tips. 07/11/18: Instructed pt on proper way to position towel roll while sidelying and completing stretches. SL thoracic stretch daily and before bed. Plan: [x] Continue per plan of care.    [] Other:      Time In: 1000  Time Out: 6815     Electronically signed by:  Adriana Tillman PTA

## 2018-08-04 ENCOUNTER — HOSPITAL ENCOUNTER (EMERGENCY)
Age: 63
Discharge: HOME | End: 2018-08-04
Attending: EMERGENCY MEDICINE
Payer: MEDICARE

## 2018-08-04 ENCOUNTER — APPOINTMENT (OUTPATIENT)
Dept: GENERAL RADIOLOGY | Age: 63
End: 2018-08-04
Payer: MEDICARE

## 2018-08-04 ENCOUNTER — HOSPITAL ENCOUNTER (OUTPATIENT)
Age: 63
Setting detail: OBSERVATION
Discharge: HOME | End: 2018-08-06
Attending: EMERGENCY MEDICINE | Admitting: EMERGENCY MEDICINE
Payer: MEDICARE

## 2018-08-04 VITALS
DIASTOLIC BLOOD PRESSURE: 78 MMHG | BODY MASS INDEX: 23.39 KG/M2 | HEART RATE: 90 BPM | TEMPERATURE: 99.1 F | WEIGHT: 116 LBS | OXYGEN SATURATION: 96 % | SYSTOLIC BLOOD PRESSURE: 164 MMHG | RESPIRATION RATE: 17 BRPM | HEIGHT: 59 IN

## 2018-08-04 DIAGNOSIS — R07.9 CHEST PAIN, UNSPECIFIED TYPE: ICD-10-CM

## 2018-08-04 DIAGNOSIS — R07.9 CHEST PAIN, UNSPECIFIED TYPE: Primary | ICD-10-CM

## 2018-08-04 DIAGNOSIS — R06.00 DYSPNEA, UNSPECIFIED TYPE: Primary | ICD-10-CM

## 2018-08-04 LAB
ABSOLUTE EOS #: 0.2 K/UL (ref 0–0.44)
ABSOLUTE IMMATURE GRANULOCYTE: 0.24 K/UL (ref 0–0.3)
ABSOLUTE LYMPH #: 3.03 K/UL (ref 1.1–3.7)
ABSOLUTE MONO #: 1.41 K/UL (ref 0.1–1.2)
ALBUMIN SERPL-MCNC: 3.6 G/DL (ref 3.5–5.2)
ALBUMIN/GLOBULIN RATIO: 1 (ref 1–2.5)
ALP BLD-CCNC: 55 U/L (ref 35–104)
ALT SERPL-CCNC: 18 U/L (ref 5–33)
ANION GAP SERPL CALCULATED.3IONS-SCNC: 15 MMOL/L (ref 9–17)
AST SERPL-CCNC: 24 U/L
BASOPHILS # BLD: 0 % (ref 0–2)
BASOPHILS ABSOLUTE: 0.04 K/UL (ref 0–0.2)
BILIRUB SERPL-MCNC: 0.46 MG/DL (ref 0.3–1.2)
BUN BLDV-MCNC: 8 MG/DL (ref 8–23)
BUN/CREAT BLD: ABNORMAL (ref 9–20)
CALCIUM SERPL-MCNC: 9 MG/DL (ref 8.6–10.4)
CHLORIDE BLD-SCNC: 102 MMOL/L (ref 98–107)
CO2: 24 MMOL/L (ref 20–31)
CREAT SERPL-MCNC: 0.49 MG/DL (ref 0.5–0.9)
DIFFERENTIAL TYPE: ABNORMAL
EOSINOPHILS RELATIVE PERCENT: 2 % (ref 1–4)
GFR AFRICAN AMERICAN: >60 ML/MIN
GFR NON-AFRICAN AMERICAN: >60 ML/MIN
GFR SERPL CREATININE-BSD FRML MDRD: ABNORMAL ML/MIN/{1.73_M2}
GFR SERPL CREATININE-BSD FRML MDRD: ABNORMAL ML/MIN/{1.73_M2}
GLUCOSE BLD-MCNC: 129 MG/DL (ref 70–99)
HCT VFR BLD CALC: 42.9 % (ref 36.3–47.1)
HEMOGLOBIN: 13.4 G/DL (ref 11.9–15.1)
IMMATURE GRANULOCYTES: 2 %
LYMPHOCYTES # BLD: 24 % (ref 24–43)
MCH RBC QN AUTO: 31.2 PG (ref 25.2–33.5)
MCHC RBC AUTO-ENTMCNC: 31.2 G/DL (ref 28.4–34.8)
MCV RBC AUTO: 100 FL (ref 82.6–102.9)
MONOCYTES # BLD: 11 % (ref 3–12)
NRBC AUTOMATED: 0.2 PER 100 WBC
PDW BLD-RTO: 15 % (ref 11.8–14.4)
PLATELET # BLD: 391 K/UL (ref 138–453)
PLATELET ESTIMATE: ABNORMAL
PMV BLD AUTO: 9 FL (ref 8.1–13.5)
POC TROPONIN I: 0 NG/ML (ref 0–0.1)
POC TROPONIN I: 0 NG/ML (ref 0–0.1)
POC TROPONIN INTERP: NORMAL
POC TROPONIN INTERP: NORMAL
POTASSIUM SERPL-SCNC: 4 MMOL/L (ref 3.7–5.3)
RBC # BLD: 4.29 M/UL (ref 3.95–5.11)
RBC # BLD: ABNORMAL 10*6/UL
SEG NEUTROPHILS: 61 % (ref 36–65)
SEGMENTED NEUTROPHILS ABSOLUTE COUNT: 7.96 K/UL (ref 1.5–8.1)
SODIUM BLD-SCNC: 141 MMOL/L (ref 135–144)
TOTAL PROTEIN: 7.1 G/DL (ref 6.4–8.3)
TROPONIN INTERP: NORMAL
TROPONIN T: <0.03 NG/ML
WBC # BLD: 12.9 K/UL (ref 3.5–11.3)
WBC # BLD: ABNORMAL 10*3/UL

## 2018-08-04 PROCEDURE — 80053 COMPREHEN METABOLIC PANEL: CPT

## 2018-08-04 PROCEDURE — G0378 HOSPITAL OBSERVATION PER HR: HCPCS

## 2018-08-04 PROCEDURE — 84484 ASSAY OF TROPONIN QUANT: CPT

## 2018-08-04 PROCEDURE — 6370000000 HC RX 637 (ALT 250 FOR IP): Performed by: EMERGENCY MEDICINE

## 2018-08-04 PROCEDURE — G0384 LEV 5 HOSP TYPE B ED VISIT: HCPCS

## 2018-08-04 PROCEDURE — 99285 EMERGENCY DEPT VISIT HI MDM: CPT

## 2018-08-04 PROCEDURE — 93005 ELECTROCARDIOGRAM TRACING: CPT

## 2018-08-04 PROCEDURE — 36415 COLL VENOUS BLD VENIPUNCTURE: CPT

## 2018-08-04 PROCEDURE — 71046 X-RAY EXAM CHEST 2 VIEWS: CPT

## 2018-08-04 PROCEDURE — 6370000000 HC RX 637 (ALT 250 FOR IP): Performed by: STUDENT IN AN ORGANIZED HEALTH CARE EDUCATION/TRAINING PROGRAM

## 2018-08-04 PROCEDURE — 85025 COMPLETE CBC W/AUTO DIFF WBC: CPT

## 2018-08-04 RX ORDER — METOPROLOL SUCCINATE 25 MG/1
25 TABLET, EXTENDED RELEASE ORAL DAILY
Status: DISCONTINUED | OUTPATIENT
Start: 2018-08-04 | End: 2018-08-06 | Stop reason: HOSPADM

## 2018-08-04 RX ORDER — GABAPENTIN 300 MG/1
300 CAPSULE ORAL 3 TIMES DAILY
Status: DISCONTINUED | OUTPATIENT
Start: 2018-08-04 | End: 2018-08-06 | Stop reason: HOSPADM

## 2018-08-04 RX ORDER — ACETAMINOPHEN 325 MG/1
650 TABLET ORAL EVERY 6 HOURS PRN
Status: DISCONTINUED | OUTPATIENT
Start: 2018-08-04 | End: 2018-10-23 | Stop reason: SDUPTHER

## 2018-08-04 RX ORDER — SIMETHICONE 80 MG
160 TABLET,CHEWABLE ORAL 4 TIMES DAILY PRN
Status: DISCONTINUED | OUTPATIENT
Start: 2018-08-04 | End: 2018-08-06 | Stop reason: HOSPADM

## 2018-08-04 RX ORDER — SODIUM CHLORIDE 0.9 % (FLUSH) 0.9 %
10 SYRINGE (ML) INJECTION EVERY 12 HOURS SCHEDULED
Status: DISCONTINUED | OUTPATIENT
Start: 2018-08-04 | End: 2018-08-06 | Stop reason: HOSPADM

## 2018-08-04 RX ORDER — ONDANSETRON 2 MG/ML
4 INJECTION INTRAMUSCULAR; INTRAVENOUS EVERY 6 HOURS PRN
Status: DISCONTINUED | OUTPATIENT
Start: 2018-08-04 | End: 2018-08-06 | Stop reason: HOSPADM

## 2018-08-04 RX ORDER — LISINOPRIL 2.5 MG/1
2.5 TABLET ORAL DAILY
Status: DISCONTINUED | OUTPATIENT
Start: 2018-08-04 | End: 2018-08-06 | Stop reason: HOSPADM

## 2018-08-04 RX ORDER — ACETAMINOPHEN 325 MG/1
650 TABLET ORAL EVERY 4 HOURS PRN
Status: DISCONTINUED | OUTPATIENT
Start: 2018-08-04 | End: 2018-08-06 | Stop reason: HOSPADM

## 2018-08-04 RX ORDER — ONDANSETRON 4 MG/1
4 TABLET, ORALLY DISINTEGRATING ORAL EVERY 8 HOURS PRN
Status: DISCONTINUED | OUTPATIENT
Start: 2018-08-04 | End: 2018-08-06 | Stop reason: HOSPADM

## 2018-08-04 RX ORDER — M-VIT,TX,IRON,MINS/CALC/FOLIC 27MG-0.4MG
1 TABLET ORAL DAILY
Status: ON HOLD | COMMUNITY
End: 2018-08-12

## 2018-08-04 RX ORDER — SODIUM CHLORIDE 0.9 % (FLUSH) 0.9 %
10 SYRINGE (ML) INJECTION PRN
Status: DISCONTINUED | OUTPATIENT
Start: 2018-08-04 | End: 2018-08-05

## 2018-08-04 RX ORDER — ALBUTEROL SULFATE 90 UG/1
2 AEROSOL, METERED RESPIRATORY (INHALATION) EVERY 4 HOURS PRN
Status: DISCONTINUED | OUTPATIENT
Start: 2018-08-04 | End: 2018-08-06 | Stop reason: HOSPADM

## 2018-08-04 RX ORDER — AMLODIPINE BESYLATE 5 MG/1
5 TABLET ORAL NIGHTLY
Status: DISCONTINUED | OUTPATIENT
Start: 2018-08-04 | End: 2018-08-06 | Stop reason: HOSPADM

## 2018-08-04 RX ORDER — CYCLOBENZAPRINE HCL 5 MG
5 TABLET ORAL 2 TIMES DAILY PRN
Status: ON HOLD | COMMUNITY
End: 2018-09-13 | Stop reason: HOSPADM

## 2018-08-04 RX ORDER — MIRTAZAPINE 15 MG/1
15 TABLET, FILM COATED ORAL NIGHTLY
Status: DISCONTINUED | OUTPATIENT
Start: 2018-08-04 | End: 2018-08-06 | Stop reason: HOSPADM

## 2018-08-04 RX ORDER — GABAPENTIN 300 MG/1
300 CAPSULE ORAL 3 TIMES DAILY
Status: ON HOLD | COMMUNITY
End: 2018-08-12

## 2018-08-04 RX ORDER — SODIUM CHLORIDE 9 MG/ML
INJECTION, SOLUTION INTRAVENOUS CONTINUOUS
Status: DISCONTINUED | OUTPATIENT
Start: 2018-08-05 | End: 2018-08-06 | Stop reason: HOSPADM

## 2018-08-04 RX ADMIN — AMLODIPINE BESYLATE 5 MG: 5 TABLET ORAL at 22:37

## 2018-08-04 RX ADMIN — GABAPENTIN 300 MG: 300 CAPSULE ORAL at 17:56

## 2018-08-04 RX ADMIN — LISINOPRIL 2.5 MG: 2.5 TABLET ORAL at 17:57

## 2018-08-04 RX ADMIN — GABAPENTIN 300 MG: 300 CAPSULE ORAL at 22:37

## 2018-08-04 RX ADMIN — MIRTAZAPINE 15 MG: 15 TABLET, FILM COATED ORAL at 22:37

## 2018-08-04 RX ADMIN — METOPROLOL SUCCINATE 25 MG: 25 TABLET, FILM COATED, EXTENDED RELEASE ORAL at 17:57

## 2018-08-04 RX ADMIN — SIMETHICONE 160 MG: 80 TABLET, CHEWABLE ORAL at 17:56

## 2018-08-04 RX ADMIN — ACETAMINOPHEN 650 MG: 325 TABLET ORAL at 16:58

## 2018-08-04 ASSESSMENT — ENCOUNTER SYMPTOMS
COUGH: 0
ABDOMINAL DISTENTION: 1
DIARRHEA: 0
ABDOMINAL PAIN: 0
CONSTIPATION: 0
VOMITING: 0
NAUSEA: 0
NAUSEA: 0
VOMITING: 0
ABDOMINAL PAIN: 0
SHORTNESS OF BREATH: 1
SHORTNESS OF BREATH: 1
ABDOMINAL DISTENTION: 1
COUGH: 0

## 2018-08-04 ASSESSMENT — PAIN DESCRIPTION - ORIENTATION: ORIENTATION: MID

## 2018-08-04 ASSESSMENT — PAIN DESCRIPTION - PROGRESSION: CLINICAL_PROGRESSION: NOT CHANGED

## 2018-08-04 ASSESSMENT — PAIN DESCRIPTION - PAIN TYPE: TYPE: ACUTE PAIN

## 2018-08-04 ASSESSMENT — HEART SCORE
ECG: 1

## 2018-08-04 ASSESSMENT — PAIN SCALES - GENERAL
PAINLEVEL_OUTOF10: 8
PAINLEVEL_OUTOF10: 8

## 2018-08-04 ASSESSMENT — PAIN DESCRIPTION - LOCATION: LOCATION: CHEST

## 2018-08-04 NOTE — ED PROVIDER NOTES
[facility] ED  eMERGENCY dEPARTMENT eNCOUnter   Attending Attestation     Pt Name: Eva Vincent  MRN: [id number]  [place] 1955  Date of evaluation: 8/4/18       Emi Reinoso is a 70 y.o. female who presents with Chest Pain (Pt reports midsternal chest pain with SOB, states that they wanted to keep her and she wanted to leave. ) and Shortness of Breath      History: patient presents with chest pain. Patient says that she left after being offered admission and then returned because she had worsening chest pain walking. Patient has no other complaints. Patient is here for admission. Exam: heart rhythm are regular. Lungs are clear to auscultation bilaterally. Abdomen is soft, nontender. Patient appears comfortable. Plan for repeat EKG and admission to observation for further evaluation of her chest pain and rule out of ACS. Patient continues to have T-wave inversions in the inferior lateral leads. I performed a history and physical examination of the patient and discussed management with the resident. I reviewed the residents note and agree with the documented findings and plan of care. Any areas of disagreement are noted on the chart. I was personally present for the key portions of any procedures. I have documented in the chart those procedures where I was not present during the key portions. I have personally reviewed all images and agree with the resident's interpretation. I have reviewed the emergency nurses triage note. I agree with the chief complaint, past medical history, past surgical history, allergies, medications, social and family history as documented unless otherwise noted below. Documentation of the HPI, Physical Exam and Medical Decision Making performed by medical students or scribes is based on my personal performance of the HPI, PE and MDM.  For Phys Assistant/ Nurse Practitioner cases/documentation I have had a face to face evaluation of this patient and

## 2018-08-04 NOTE — ED NOTES
Pt placed on cardiac monitor, BP cuff, and pulse ox. Alarms set.       Michelle Hill RN  08/04/18 9100

## 2018-08-04 NOTE — ED NOTES
Pt resting on cot, alert, oriented, no distress noted, rr even and non labored.  Pt updated on plan of care and duration, pt denies needs, will continue to monitor     Xavier Vazquez RN  08/04/18 4108

## 2018-08-04 NOTE — ED PROVIDER NOTES
Cigarettes    Smokeless tobacco: Never Used    Alcohol use No      Comment: past heavy drinker    Drug use: No      Comment: past history    Sexual activity: Not on file     Other Topics Concern    Not on file     Social History Narrative    No narrative on file       History reviewed. No pertinent family history. Allergies:  Aspirin; Keflet [cephalexin]; and Motrin [ibuprofen]    Home Medications:  Prior to Admission medications    Medication Sig Start Date End Date Taking? Authorizing Provider   acetaminophen (APAP EXTRA STRENGTH) 500 MG tablet Take 2 tablets by mouth every 8 hours as needed for Pain 8/4/17   Jaclyn Tilley DO   albuterol sulfate HFA (PROVENTIL HFA) 108 (90 Base) MCG/ACT inhaler Inhale 1-2 puffs into the lungs every 4 hours as needed for Wheezing or Shortness of Breath (Space out to every 6 hours as symptoms improve) Space out to every 6 hours as symptoms improve. 6/14/17   Shahana Jade MD   amLODIPine (NORVASC) 5 MG tablet  1/17/17   Historical Provider, MD   SUBOXONE 8-2 MG FILM  1/19/17   Historical Provider, MD   lisinopril (PRINIVIL;ZESTRIL) 2.5 MG tablet  1/17/17   Historical Provider, MD   metoprolol succinate (TOPROL XL) 25 MG extended release tablet  1/17/17   Historical Provider, MD       REVIEW OF SYSTEMS    (2-9 systems for level 4, 10 or more for level 5)      Review of Systems   Constitutional: Negative for chills and fever. HENT: Negative. Respiratory: Positive for shortness of breath. Negative for cough. Cardiovascular: Positive for chest pain. Negative for palpitations and leg swelling. Gastrointestinal: Positive for abdominal distention. Negative for abdominal pain, constipation, diarrhea, nausea and vomiting. Genitourinary: Negative for dysuria and hematuria. Musculoskeletal: Negative for neck pain and neck stiffness. Skin: Negative for rash and wound. Neurological: Negative for weakness, numbness and headaches.    Psychiatric/Behavioral: IMPRESSION: 76-year-old female presents with chest pain shortness of breath. Vital signs are stable. Lungs clear to auscultation bilaterally, abdomen distended but soft no tenderness on palpation. No edema bilateral lower joints. RADIOLOGY:  Xr Chest Standard (2 Vw)    Result Date: 8/4/2018  EXAMINATION: TWO VIEWS OF THE CHEST 8/4/2018 9:40 am COMPARISON: May 12, 2018. March 30, 2017. HISTORY: ORDERING SYSTEM PROVIDED HISTORY: shortness of breath TECHNOLOGIST PROVIDED HISTORY: Reason for exam:->shortness of breath FINDINGS: Frontal view of the chest demonstrates no lines or tubes. Normal cardiomediastinal silhouette. The lungs show basilar atelectasis. No pleural effusions. Mild pulmonary edema. No suspicious pulmonary nodules. No acute osseous abnormality. Linear presumed foreign bodies project over the bilateral lung apices of unclear etiology. Findings have been stable since March 2017. 1. Mild pulmonary edema with basilar atelectasis.        EKG  EKG Interpretation    Interpreted by me    Rhythm: normal sinus   Rate: normal  Axis: normal  Ectopy: none  Conduction: normal  ST Segments: no acute change  T Waves: T wave inversions  Q Waves: none    Clinical Impression: New T-wave inversions    All EKG's are interpreted by the Emergency Department Physician who either signs or Co-signs this chart in the absence of a cardiologist.    EMERGENCY DEPARTMENT COURSE:  Heart Score:   Heart Score for chest pain patients  History: Slightly Suspicious  ECG: Non-Specifc repolarization disturbance/LBTB/PM  Patient Age: > 65 years  *Risk factors for Atherosclerotic disease: Cigarette smoking, Hypertension  Risk Factors: 1 or 2 risk factors  Troponin: < 1X normal limit  Heart Score Total: 4    Score 0-3: 2.5% MACE over next 6 weeks = Discharge Home  Score 4-6: 20.3% MACE over next 6 weeks = Admit for Clinical Observation  Score 7-10: 72.7% MACE over next 6 weeks = Early Invasive Strategies   Chest Pain in

## 2018-08-04 NOTE — ED PROVIDER NOTES
Types: Cigarettes    Smokeless tobacco: Never Used    Alcohol use No      Comment: past heavy drinker    Drug use: No      Comment: past history    Sexual activity: Not on file     Other Topics Concern    Not on file     Social History Narrative    No narrative on file       History reviewed. No pertinent family history. Allergies:  Aspirin; Keflet [cephalexin]; and Motrin [ibuprofen]    Home Medications:  Prior to Admission medications    Medication Sig Start Date End Date Taking? Authorizing Provider   gabapentin (NEURONTIN) 300 MG capsule Take 300 mg by mouth 3 times daily. .   Yes Historical Provider, MD   Multiple Vitamins-Minerals (THERAPEUTIC MULTIVITAMIN-MINERALS) tablet Take 1 tablet by mouth daily   Yes Historical Provider, MD   cyclobenzaprine (FLEXERIL) 5 MG tablet Take 5 mg by mouth 2 times daily as needed for Muscle spasms   Yes Historical Provider, MD   acetaminophen (APAP EXTRA STRENGTH) 500 MG tablet Take 2 tablets by mouth every 8 hours as needed for Pain 8/4/17   Sariah Aly DO   albuterol sulfate HFA (PROVENTIL HFA) 108 (90 Base) MCG/ACT inhaler Inhale 1-2 puffs into the lungs every 4 hours as needed for Wheezing or Shortness of Breath (Space out to every 6 hours as symptoms improve) Space out to every 6 hours as symptoms improve. 6/14/17   Courtney Wilson MD   amLODIPine (NORVASC) 5 MG tablet  1/17/17   Historical Provider, MD   SUBOXONE 8-2 MG FILM  1/19/17   Historical Provider, MD   lisinopril (PRINIVIL;ZESTRIL) 2.5 MG tablet  1/17/17   Historical Provider, MD   metoprolol succinate (TOPROL XL) 25 MG extended release tablet  1/17/17   Historical Provider, MD       REVIEW OF SYSTEMS    (2-9 systems for level 4, 10 or more for level 5)      Review of Systems   Constitutional: Negative for chills and fever. HENT: Negative. Eyes: Negative for visual disturbance. Respiratory: Positive for shortness of breath. Negative for cough. Cardiovascular: Positive for chest pain. that time. Patient requesting admission now. Return with worsening right-sided chest pain. Vital stable. Patient with heart score for, with new EKG changes. Plan for observation admission for cardiac consultation. RADIOLOGY:  None    EKG  EKG Interpretation    Interpreted by me    Rhythm: normal sinus   Rate: normal  Axis: normal  Ectopy: none  Conduction: normal  ST Segments: no acute change  T Waves: T wave inversions  Q Waves: none    Clinical Impression: no acute changes    All EKG's are interpreted by the Emergency Department Physician who either signs or Co-signs this chart in the absence of a cardiologist.    EMERGENCY DEPARTMENT COURSE:  Heart Score:   Heart Score for chest pain patients  History: Slightly Suspicious  ECG: Non-Specifc repolarization disturbance/LBTB/PM  Patient Age: > 65 years  Risk Factors: 1 or 2 risk factors  Troponin: < 1X normal limit  Heart Score Total: 4    Score 0-3: 2.5% MACE over next 6 weeks = Discharge Home  Score 4-6: 20.3% MACE over next 6 weeks = Admit for Clinical Observation  Score 7-10: 72.7% MACE over next 6 weeks = Early Invasive Strategies   Chest Pain in the Emergency Room: A Multicenter Validation of the HEART Score. Ubaldo BE, Aashish AJ, Mary Lou ALEXANDER, Mast TP, [employer], Mast EG, Yolie SH, [facility]. Crit Pathw Cardiol. 2010 Sep; 9(3): 164-169. \"A prospective validation of the HEART score for chest pain patients at the emergency department. \" Int J Cardiol. 2013 Oct 3;168(3):2153-8. doi: 10.1016/j.ijcard. 2013.01.255. Epub 2013 Mar 7.    2:08 PM The patient to be admitted observation unit for cardiology consultation. Patient with a heart score of 4 with EKG changes. PROCEDURES:  None    CONSULTS:  IP CONSULT TO CARDIOLOGY  IP CONSULT TO CARDIOLOGY    CRITICAL CARE:  None    FINAL IMPRESSION      1.  Chest pain, unspecified type          DISPOSITION / PLAN     DISPOSITION Admitted 08/04/2018 02:36:06 PM      PATIENT REFERRED Eun Damico  [address]  [phone]            DISCHARGE MEDICATIONS:  Current Discharge Medication List          Suzan Dickerson DO  Emergency Medicine Resident    (Please note that portions of this note were completed with a voice recognition program.  Efforts were made to edit the dictations but occasionally words are mis-transcribed.)        Suzan Dickerson DO  08/04/18 5147

## 2018-08-05 PROBLEM — J44.9 COPD (CHRONIC OBSTRUCTIVE PULMONARY DISEASE) (HCC): Status: ACTIVE | Noted: 2018-08-05

## 2018-08-05 LAB
BNP INTERPRETATION: NORMAL
D-DIMER QUANTITATIVE: 0.67 MG/L FEU
PRO-BNP: 183 PG/ML
TROPONIN INTERP: NORMAL
TROPONIN INTERP: NORMAL
TROPONIN T: <0.03 NG/ML
TROPONIN T: <0.03 NG/ML

## 2018-08-05 PROCEDURE — 83880 ASSAY OF NATRIURETIC PEPTIDE: CPT

## 2018-08-05 PROCEDURE — 94762 N-INVAS EAR/PLS OXIMTRY CONT: CPT

## 2018-08-05 PROCEDURE — G0378 HOSPITAL OBSERVATION PER HR: HCPCS

## 2018-08-05 PROCEDURE — 36416 COLLJ CAPILLARY BLOOD SPEC: CPT

## 2018-08-05 PROCEDURE — 6370000000 HC RX 637 (ALT 250 FOR IP): Performed by: STUDENT IN AN ORGANIZED HEALTH CARE EDUCATION/TRAINING PROGRAM

## 2018-08-05 PROCEDURE — 36415 COLL VENOUS BLD VENIPUNCTURE: CPT

## 2018-08-05 PROCEDURE — 6370000000 HC RX 637 (ALT 250 FOR IP): Performed by: EMERGENCY MEDICINE

## 2018-08-05 PROCEDURE — 93005 ELECTROCARDIOGRAM TRACING: CPT

## 2018-08-05 PROCEDURE — 83036 HEMOGLOBIN GLYCOSYLATED A1C: CPT

## 2018-08-05 PROCEDURE — 2580000003 HC RX 258: Performed by: EMERGENCY MEDICINE

## 2018-08-05 PROCEDURE — 99219 PR INITIAL OBSERVATION CARE/DAY 50 MINUTES: CPT | Performed by: INTERNAL MEDICINE

## 2018-08-05 PROCEDURE — 85379 FIBRIN DEGRADATION QUANT: CPT

## 2018-08-05 PROCEDURE — 84484 ASSAY OF TROPONIN QUANT: CPT

## 2018-08-05 RX ORDER — IPRATROPIUM BROMIDE AND ALBUTEROL SULFATE 2.5; .5 MG/3ML; MG/3ML
1 SOLUTION RESPIRATORY (INHALATION) EVERY 4 HOURS PRN
Status: DISCONTINUED | OUTPATIENT
Start: 2018-08-05 | End: 2018-08-06 | Stop reason: HOSPADM

## 2018-08-05 RX ORDER — PREDNISONE 20 MG/1
40 TABLET ORAL DAILY
Status: DISCONTINUED | OUTPATIENT
Start: 2018-08-05 | End: 2018-08-06 | Stop reason: HOSPADM

## 2018-08-05 RX ADMIN — Medication 10 ML: at 12:25

## 2018-08-05 RX ADMIN — MIRTAZAPINE 15 MG: 15 TABLET, FILM COATED ORAL at 20:55

## 2018-08-05 RX ADMIN — AMLODIPINE BESYLATE 5 MG: 5 TABLET ORAL at 20:55

## 2018-08-05 RX ADMIN — SIMETHICONE 160 MG: 80 TABLET, CHEWABLE ORAL at 17:07

## 2018-08-05 RX ADMIN — ACETAMINOPHEN 650 MG: 325 TABLET ORAL at 04:16

## 2018-08-05 RX ADMIN — SIMETHICONE 160 MG: 80 TABLET, CHEWABLE ORAL at 12:23

## 2018-08-05 RX ADMIN — METOPROLOL SUCCINATE 25 MG: 25 TABLET, FILM COATED, EXTENDED RELEASE ORAL at 12:22

## 2018-08-05 RX ADMIN — PREDNISONE 40 MG: 20 TABLET ORAL at 17:05

## 2018-08-05 RX ADMIN — GABAPENTIN 300 MG: 300 CAPSULE ORAL at 12:23

## 2018-08-05 RX ADMIN — GABAPENTIN 300 MG: 300 CAPSULE ORAL at 20:55

## 2018-08-05 RX ADMIN — LISINOPRIL 2.5 MG: 2.5 TABLET ORAL at 12:23

## 2018-08-05 RX ADMIN — GABAPENTIN 300 MG: 300 CAPSULE ORAL at 17:06

## 2018-08-05 ASSESSMENT — PAIN SCALES - GENERAL: PAINLEVEL_OUTOF10: 8

## 2018-08-05 NOTE — H&P
either signs or Co-signs this chart in the absence of a cardiologist.    EKG Interpretation    Interpreted by observation physician    Rhythm: normal sinus   Rate: normal  Axis: right  Ectopy: none  Conduction: Possible atrial enlargement  ST Segments: no acute change  T Waves: inversion in  v3, v4, v5 and v6  Q Waves: none    Clinical Impression: non-specific EKG    Joleen Paul DO    RADIOLOGY:   I directly visualized the following  images and reviewed the radiologist interpretations:    Xr Chest Standard (2 Vw)    Result Date: 8/4/2018  EXAMINATION: TWO VIEWS OF THE CHEST 8/4/2018 9:40 am COMPARISON: May 12, 2018. March 30, 2017. HISTORY: ORDERING SYSTEM PROVIDED HISTORY: shortness of breath TECHNOLOGIST PROVIDED HISTORY: Reason for exam:->shortness of breath FINDINGS: Frontal view of the chest demonstrates no lines or tubes. Normal cardiomediastinal silhouette. The lungs show basilar atelectasis. No pleural effusions. Mild pulmonary edema. No suspicious pulmonary nodules. No acute osseous abnormality. Linear presumed foreign bodies project over the bilateral lung apices of unclear etiology. Findings have been stable since March 2017. 1. Mild pulmonary edema with basilar atelectasis. LABS:  I have reviewed and interpreted all available lab results.   Labs Reviewed   TROPONIN   TROPONIN   TROPONIN   URINE DRUG SCREEN   D-DIMER, QUANTITATIVE       SCREENING TOOLS:    HEART Risk Score for Chest Pain Patients   History and Physical Exam Suspicion Level  (Nausea, Vomiting, Diaphoresis, Radiation, Exertion)   Slightly Suspicious (0 pts)   Moderately Suspicious (1 pt)   Highly Suspicious (2 pts)   EKG Interpretation   Normal (0 pts)   Non-Specific Repolarization Disturbance (1 pt)   Significant ST-Depression (2 pts)   Age of Patient (in years)   = 45 (0 pts)   46-64 (1 pt)   = 65 (2 pts)   Risk Factors   No Risk Factors (0 pts)   1-2 Risk Factors (1 pt)   = 3 Risk Factors (2 pts)   Risk Factors Include:   Hypercholesterolemia   Hypertension   Diabetes Mellitus   Cigarette smoking   Positive family history   Obesity   CAD   (SLE, CKDz, HIV, Cocaine abuse)   Troponin Levels   = Normal Limit (0 pts)   1-3 Times Normal Limit (1 pt)   > 3 Times Normal Limit (2 pts)  TOTAL:3    Percent Risk for Major Adverse Cardiac Event (MACE)  0-3 pts indicates low risk for MACE   2.5% (DISCHARGE)   4-7 pts indicates moderate risk for MACE  20.3% (OBS)  8-10 pts indicates high risk for MACE  72.7% (EARLY INVASIVE TX)    Wells Criteria PE    1. Symptoms of DVT (3pts): 0  2. No alternative diagnosis better explains illness (3pts):  0  3. Tachycardia >100 (1.5pts): 0  4. Immobilization >= 3 days or surgery in last 4 weeks (1.5pts): 0  5. Hemoptysis (1.5pts): 0  6. Malignancy (1.5pts): 0    >6 pts High Probability  2 to <6 pts Moderate Probability  <2 pts Low Probability    PERC Rules for PE  1. Age > 50 years: Yes  2. HR > 100: No  3. Room air O2 sat <95%: No  4. Prior History of Venous Thromboembolism: No  5. Trauma or Surgery Within Last 4 weeks: No  6. Hemoptysis: No  7. Exogenous Estrogen: No  8. Unilateral Leg Swelling: No    If no criteria are positive and clinicians pre-test probability is <15%, PERC Rule criteria are satisfied. Probability of VTE <2%. J Thromb Haemost. 2004 Aug;2(8):1247-55. Liss Arriaga. 2008 May;6(5):772-80     CDU IMPRESSION / PLAN      Emi Meehan is a 70 y.o. female who presents with     Acute onset of shortness of breath accompanied by her palpitations, etiology unknown, cardiology and pulmonology consulted  -Patient had cardiac workup in the ED which showed negative troponins. Chest x-ray showed mild pulmonary edema.   EKG showed right axis deviation, atrial enlargement, T wave inversion in leads V3 through V6  -Patient had a cardiac stress test on 8/26/14 which was negative   -D-dimer was ordered  -BNP was ordered  -Urine drug screen ordered as patient has a past history of substance

## 2018-08-05 NOTE — PROGRESS NOTES
Smoking Cessation - topics covered   []  Health Risks  []  Benefits of Quitting   []  Smoking Cessation  []  Patient has no history of tobacco use  [x]  Patient is former smoker. [x]  No need for tobacco cessation education. []  Booklet given  []  Patient verbalizes understanding. []  Patient denies need for tobacco cessation education.   Delphine Lloyd  11:05 AM

## 2018-08-05 NOTE — PROGRESS NOTES
CDU Daily Progress Note  Attending Physician       Pt Name: Emily Devlin  MRN: [id number]  [place] 1955  Date of evaluation: 8/5/18    I performed a history and physical examination of the patient and discussed management with the resident. I reviewed the residents note and agree with the documented findings and plan of care. Any areas of disagreement are noted on the chart. I was personally present for the key portions of any procedures. I have documented in the chart those procedures where I was not present during the key portions. I have reviewed the emergency nurses triage note. I agree with the chief complaint, past medical history, past surgical history, allergies, medications, social and family history as documented unless otherwise noted below. Documentation of the HPI, Physical Exam and Medical Decision Making performed by medical students or scribes is based on my personal performance of the HPI, PE and MDM. For Physician Assistant/ Nurse Practitioner cases/documentation I have personally evaluated this patient and have completed at least one if not all key elements of the E/M (history, physical exam, and MDM). Additional findings are as noted. The Family History, Social History and Review of Systems are unchanged from the previous day. No significant events overnight. R sided chest pain sharp, with SOB x few days. Denies diaphoresis, N/V, bounce back patient. PMHx: HTN, smoker. PMHx: GERD, Hep C, HTN, OA, substance abuse. Gastric bypass surgery. ECG nondiagnostic, trops neg, CXR mild pulm edema. Cards consulted. Lexiscan ordered    Patient smokes 20 cigarettes per day for 44 years. Smoking cessation counseling for 3 minutes. Discussed the effects of smoking in regards to heart disease, lung disease, stroke and cancer. I explained how this negatively effects their condition, will contribute to increased recovery time, and possible lead to further health problems in the future.           Chantal Roberts Boris Ram MD  Attending Physician  Critical Decision Unit

## 2018-08-05 NOTE — FLOWSHEET NOTE
visited pt during rounding. Pt was glad to see the . Pt talked about what she was going through. Pt wants to get home.  offered words of comfort and support.  provided spiritual and emotional support.  ask if prayer was meaningful and pt accepted.  prayed and pt thanked him for coming.

## 2018-08-05 NOTE — CONSULTS
is not history of TB or TB exposure. There is not asbestos or silica dust exposure. The patient reports does not have coal, foundry, quarry or [employer] exposure. Travel history reveals nothing of significance. There is not  history of recreational or IV drug use. There is not hot tube exposure. The patient does not have pets, dogs, cats turtles or exotic birds. REVIEW OF SYSTEMS:    Review of Systems -   General ROS: Completed and except as mentioned above were negative   Psychological ROS:  Completed and except as mentioned above were negative  Ophthalmic ROS:  Completed and except as mentioned above were negative  ENT ROS:  Completed and except as mentioned above were negative  Allergy and Immunology ROS:  Completed and except as mentioned above were negative  Hematological and Lymphatic ROS:  Completed and except as mentioned above were negative  Endocrine ROS: Completed and except as mentioned above were negative  Breast ROS:  Completed and except as mentioned above were negative  Respiratory ROS:  Completed and except as mentioned above were negative  Cardiovascular ROS:  Completed and except as mentioned above were negative  Gastrointestinal ROS: Completed and except as mentioned above were negative  Genito-Urinary ROS:  Completed and except as mentioned above were negative  Musculoskeletal ROS:  Completed and except as mentioned above were negative  Neurological ROS:  Completed and except as mentioned above were negative  Dermatological ROS:  Completed and except as mentioned above were negative          Physical Exam:    Vitals: BP (!) 145/94   Pulse 108   Temp 99.1 °F (37.3 °C)   Resp 18   Ht 4' 11\" (1.499 m)   Wt 117 lb 8 oz (53.3 kg)   LMP 03/18/2015   SpO2 94%   BMI 23.73 kg/m²     Last Body weight:   Wt Readings from Last 3 Encounters:   08/04/18 117 lb 8 oz (53.3 kg)   08/04/18 116 lb (52.6 kg)   06/05/18 106 lb (48.1 kg)       Body Mass Index : Body mass index is 23.73 kg/m². pain  Palpitations     Plan:  PFT tomorrow   Prednisone 40mg for 5 days  Recommend flu vaccination in the fall annually. Recommendations given regarding pneumococcal vaccinations. Recommend/continue smoking cessation. Ce June MD PGY 3  Internal Medicine Resident    [facility]   1:38 PM  8/5/18  Attending Physician Statement  I have discussed the care of Emi Daniels, including pertinent history and exam findings,  with the resident. I have seen and examined the patient and the key elements of all parts of the encounter have been performed by me. I agree with the assessment, plan and orders as documented by the resident with additions . Copd severity to be determined   Smoker   Plan   Albuterol inhaled q 6   pft thien   Based on results start LAMA   Smoking cessation   Out pt LCSCT     Treatment plan Discussed with nursing staff in detail , all questions answered . Electronically signed by Lissy Chaudhari MD on   8/5/18 at 3:17 PM    Please note that this chart was generated using voice recognition Dragon dictation software. Although every effort was made to ensure the accuracy of this automated transcription, some errors in transcription may have occurred.

## 2018-08-06 ENCOUNTER — APPOINTMENT (OUTPATIENT)
Dept: NUCLEAR MEDICINE | Age: 63
End: 2018-08-06
Payer: MEDICARE

## 2018-08-06 ENCOUNTER — APPOINTMENT (OUTPATIENT)
Dept: CT IMAGING | Age: 63
End: 2018-08-06
Payer: MEDICARE

## 2018-08-06 VITALS
WEIGHT: 117.5 LBS | TEMPERATURE: 100.2 F | OXYGEN SATURATION: 97 % | HEIGHT: 59 IN | SYSTOLIC BLOOD PRESSURE: 143 MMHG | BODY MASS INDEX: 23.69 KG/M2 | HEART RATE: 89 BPM | DIASTOLIC BLOOD PRESSURE: 92 MMHG | RESPIRATION RATE: 18 BRPM

## 2018-08-06 LAB
AMPHETAMINE SCREEN URINE: NEGATIVE
BARBITURATE SCREEN URINE: NEGATIVE
BENZODIAZEPINE SCREEN, URINE: NEGATIVE
BUPRENORPHINE URINE: NORMAL
CANNABINOID SCREEN URINE: NEGATIVE
COCAINE METABOLITE, URINE: NEGATIVE
EKG ATRIAL RATE: 76 BPM
EKG ATRIAL RATE: 84 BPM
EKG ATRIAL RATE: 86 BPM
EKG ATRIAL RATE: 95 BPM
EKG P AXIS: 35 DEGREES
EKG P AXIS: 47 DEGREES
EKG P AXIS: 53 DEGREES
EKG P-R INTERVAL: 144 MS
EKG P-R INTERVAL: 148 MS
EKG Q-T INTERVAL: 362 MS
EKG Q-T INTERVAL: 378 MS
EKG Q-T INTERVAL: 382 MS
EKG Q-T INTERVAL: 386 MS
EKG QRS DURATION: 60 MS
EKG QRS DURATION: 68 MS
EKG QRS DURATION: 68 MS
EKG QRS DURATION: 70 MS
EKG QTC CALCULATION (BAZETT): 425 MS
EKG QTC CALCULATION (BAZETT): 451 MS
EKG QTC CALCULATION (BAZETT): 454 MS
EKG QTC CALCULATION (BAZETT): 461 MS
EKG R AXIS: 112 DEGREES
EKG R AXIS: 36 DEGREES
EKG R AXIS: 49 DEGREES
EKG R AXIS: 52 DEGREES
EKG T AXIS: -44 DEGREES
EKG T AXIS: 1 DEGREES
EKG T AXIS: 164 DEGREES
EKG T AXIS: 29 DEGREES
EKG VENTRICULAR RATE: 76 BPM
EKG VENTRICULAR RATE: 84 BPM
EKG VENTRICULAR RATE: 86 BPM
EKG VENTRICULAR RATE: 95 BPM
ESTIMATED AVERAGE GLUCOSE: 108 MG/DL
HBA1C MFR BLD: 5.4 % (ref 4–6)
LV EF: 70 %
LVEF MODALITY: NORMAL
MDMA URINE: NORMAL
METHADONE SCREEN, URINE: NEGATIVE
METHAMPHETAMINE, URINE: NORMAL
OPIATES, URINE: NEGATIVE
OXYCODONE SCREEN URINE: NEGATIVE
PHENCYCLIDINE, URINE: NEGATIVE
PROPOXYPHENE, URINE: NORMAL
TEST INFORMATION: NORMAL
TRICYCLIC ANTIDEPRESSANTS, UR: NORMAL
TROPONIN INTERP: NORMAL
TROPONIN INTERP: NORMAL
TROPONIN T: <0.03 NG/ML
TROPONIN T: <0.03 NG/ML

## 2018-08-06 PROCEDURE — 93017 CV STRESS TEST TRACING ONLY: CPT | Performed by: NURSE PRACTITIONER

## 2018-08-06 PROCEDURE — 99226 PR SBSQ OBSERVATION CARE/DAY 35 MINUTES: CPT | Performed by: INTERNAL MEDICINE

## 2018-08-06 PROCEDURE — 36415 COLL VENOUS BLD VENIPUNCTURE: CPT

## 2018-08-06 PROCEDURE — 6360000002 HC RX W HCPCS: Performed by: INTERNAL MEDICINE

## 2018-08-06 PROCEDURE — 94729 DIFFUSING CAPACITY: CPT

## 2018-08-06 PROCEDURE — G0378 HOSPITAL OBSERVATION PER HR: HCPCS

## 2018-08-06 PROCEDURE — 78452 HT MUSCLE IMAGE SPECT MULT: CPT

## 2018-08-06 PROCEDURE — 6370000000 HC RX 637 (ALT 250 FOR IP): Performed by: STUDENT IN AN ORGANIZED HEALTH CARE EDUCATION/TRAINING PROGRAM

## 2018-08-06 PROCEDURE — 6360000004 HC RX CONTRAST MEDICATION: Performed by: EMERGENCY MEDICINE

## 2018-08-06 PROCEDURE — 3430000000 HC RX DIAGNOSTIC RADIOPHARMACEUTICAL: Performed by: INTERNAL MEDICINE

## 2018-08-06 PROCEDURE — A9500 TC99M SESTAMIBI: HCPCS | Performed by: INTERNAL MEDICINE

## 2018-08-06 PROCEDURE — 80307 DRUG TEST PRSMV CHEM ANLYZR: CPT

## 2018-08-06 PROCEDURE — 6370000000 HC RX 637 (ALT 250 FOR IP): Performed by: EMERGENCY MEDICINE

## 2018-08-06 PROCEDURE — 94640 AIRWAY INHALATION TREATMENT: CPT

## 2018-08-06 PROCEDURE — 94060 EVALUATION OF WHEEZING: CPT

## 2018-08-06 PROCEDURE — 94664 DEMO&/EVAL PT USE INHALER: CPT

## 2018-08-06 PROCEDURE — 94727 GAS DIL/WSHOT DETER LNG VOL: CPT

## 2018-08-06 PROCEDURE — 2580000003 HC RX 258: Performed by: INTERNAL MEDICINE

## 2018-08-06 PROCEDURE — 76937 US GUIDE VASCULAR ACCESS: CPT

## 2018-08-06 PROCEDURE — 94726 PLETHYSMOGRAPHY LUNG VOLUMES: CPT

## 2018-08-06 PROCEDURE — 88738 HGB QUANT TRANSCUTANEOUS: CPT

## 2018-08-06 PROCEDURE — 84484 ASSAY OF TROPONIN QUANT: CPT

## 2018-08-06 PROCEDURE — 71260 CT THORAX DX C+: CPT

## 2018-08-06 PROCEDURE — 6360000002 HC RX W HCPCS: Performed by: EMERGENCY MEDICINE

## 2018-08-06 RX ORDER — SODIUM CHLORIDE 0.9 % (FLUSH) 0.9 %
10 SYRINGE (ML) INJECTION PRN
Status: DISCONTINUED | OUTPATIENT
Start: 2018-08-06 | End: 2018-08-06

## 2018-08-06 RX ORDER — SODIUM CHLORIDE 0.9 % (FLUSH) 0.9 %
10 SYRINGE (ML) INJECTION PRN
Status: DISCONTINUED | OUTPATIENT
Start: 2018-08-06 | End: 2018-08-06 | Stop reason: HOSPADM

## 2018-08-06 RX ORDER — SODIUM CHLORIDE 9 MG/ML
INJECTION, SOLUTION INTRAVENOUS ONCE
Status: COMPLETED | OUTPATIENT
Start: 2018-08-06 | End: 2018-08-06

## 2018-08-06 RX ORDER — PREDNISONE 50 MG/1
50 TABLET ORAL DAILY
Qty: 5 TABLET | Refills: 0 | Status: ON HOLD | OUTPATIENT
Start: 2018-08-06 | End: 2018-08-12

## 2018-08-06 RX ORDER — NITROGLYCERIN 0.4 MG/1
0.4 TABLET SUBLINGUAL EVERY 5 MIN PRN
Status: DISCONTINUED | OUTPATIENT
Start: 2018-08-06 | End: 2018-08-06

## 2018-08-06 RX ORDER — METOPROLOL TARTRATE 5 MG/5ML
2.5 INJECTION INTRAVENOUS PRN
Status: DISCONTINUED | OUTPATIENT
Start: 2018-08-06 | End: 2018-08-06

## 2018-08-06 RX ORDER — AMINOPHYLLINE DIHYDRATE 25 MG/ML
100 INJECTION, SOLUTION INTRAVENOUS
Status: DISCONTINUED | OUTPATIENT
Start: 2018-08-06 | End: 2018-08-06 | Stop reason: RX

## 2018-08-06 RX ORDER — BUPRENORPHINE HYDROCHLORIDE AND NALOXONE HYDROCHLORIDE DIHYDRATE 8; 2 MG/1; MG/1
1 TABLET SUBLINGUAL DAILY
Status: DISCONTINUED | OUTPATIENT
Start: 2018-08-06 | End: 2018-08-06 | Stop reason: HOSPADM

## 2018-08-06 RX ADMIN — METOPROLOL SUCCINATE 25 MG: 25 TABLET, FILM COATED, EXTENDED RELEASE ORAL at 13:05

## 2018-08-06 RX ADMIN — LISINOPRIL 2.5 MG: 2.5 TABLET ORAL at 13:05

## 2018-08-06 RX ADMIN — TETRAKIS(2-METHOXYISOBUTYLISOCYANIDE)COPPER(I) TETRAFLUOROBORATE 15.8 MILLICURIE: 1 INJECTION, POWDER, LYOPHILIZED, FOR SOLUTION INTRAVENOUS at 09:06

## 2018-08-06 RX ADMIN — GABAPENTIN 300 MG: 300 CAPSULE ORAL at 13:05

## 2018-08-06 RX ADMIN — SODIUM CHLORIDE, PRESERVATIVE FREE 10 ML: 5 INJECTION INTRAVENOUS at 10:41

## 2018-08-06 RX ADMIN — SODIUM CHLORIDE: 9 INJECTION, SOLUTION INTRAVENOUS at 10:31

## 2018-08-06 RX ADMIN — GABAPENTIN 300 MG: 300 CAPSULE ORAL at 08:11

## 2018-08-06 RX ADMIN — REGADENOSON 0.4 MG: 0.08 INJECTION, SOLUTION INTRAVENOUS at 10:40

## 2018-08-06 RX ADMIN — Medication 10 ML: at 10:30

## 2018-08-06 RX ADMIN — IOPAMIDOL 75 ML: 755 INJECTION, SOLUTION INTRAVENOUS at 17:47

## 2018-08-06 RX ADMIN — SODIUM CHLORIDE, PRESERVATIVE FREE 10 ML: 5 INJECTION INTRAVENOUS at 09:06

## 2018-08-06 RX ADMIN — BUPRENORPHINE AND NALOXONE 1 TABLET: 8; 2 TABLET SUBLINGUAL at 13:05

## 2018-08-06 RX ADMIN — PREDNISONE 40 MG: 20 TABLET ORAL at 08:11

## 2018-08-06 RX ADMIN — TETRAKIS(2-METHOXYISOBUTYLISOCYANIDE)COPPER(I) TETRAFLUOROBORATE 41 MILLICURIE: 1 INJECTION, POWDER, LYOPHILIZED, FOR SOLUTION INTRAVENOUS at 10:41

## 2018-08-06 ASSESSMENT — PAIN SCALES - GENERAL: PAINLEVEL_OUTOF10: 8

## 2018-08-06 NOTE — PROGRESS NOTES
Attending Physician Statement  I have discussed the care of Emi Daniels, including pertinent history and exam findings with the resident. I have reviewed the key elements of all parts of the encounter with the resident. I have seen and examined the patient with the resident. I agree with the assessment and plan and status of the problem list as documented. I seen the patient during round this morning and I have reviewed the chart, cardiology note seen. She currently denies shortness of breath she claimed her main complaints was palpitation and chest discomfort she has mild chronic cough no sputum production no yellowish or purulent sputum and she is afebrile she has bilateral breath sound slightly distant bilaterally no expiratory wheezing and no rhonchi and no crackles on exam.  She denies currently chest pain or pedal edema. Her blood pressure still elevated and her oxygen saturation was 94-96% on room air  Because of elevated d-dimer CT chest for PE protocol was ordered by primary/observation team  Continue with DuoNeb and she is on short course of prednisone. On discharge she can be switched to Spiriva once daily and albuterol to be used as needed. If she is not able to do pulmonary function test before discharge, it can be done as an outpatient. As CT scan chest was ordered will wait for the results and a CT is negative for pulmonary embolism she can be discharged  Stress test was ordered by cardiology and pending results    Teresa Dubose MD  8/6/2018 1:30 PM    Please note that this chart was generated using voice recognition Dragon dictation software. Although every effort was made to ensure the accuracy of this automated transcription, some errors in transcription may have occurred.

## 2018-08-06 NOTE — PROGRESS NOTES
OBS/CDU   RESIDENT NOTE      Patients PCP is:  Edwin Velazquez        SUBJECTIVE      No acute events overnight. Patient states that her shortness of breath has improved and she has not had any heart palpitations since she has not smoked cigarettes since being in the hospital.  She states that she \"wants to get her test done this morning and then get up on out of here\". Has been able to tolerate a full diet without nausea or vomiting. The patient is urinating on his own and is passing flatus. Denies fever, chills, nausea, vomiting, chest pain, shortness of breath, abdominal pain, focal weakness, numbness, tingling, urinary/bowel symptoms, vision changes, visual hallucinations, or headache. PHYSICAL EXAM      General: NAD, AO X 3  Heent: EMOI, PERRL  Neck: SUPPLE, NO JVD  Cardiovascular: RRR, S1S2  Pulmonary: CTAB, NO SOB  Abdomen: SOFT, NTTP, ND, +BS  Extremities: +2/4 PULSES DISTAL, NO SWELLING  Neuro / Psych: NO NUMBNESS OR TINGLING, MENTATION AT BASELINE    PERTINENT TEST /EXAMS      I have reviewed all available laboratory results.     MEDICATIONS CURRENT     regadenoson (LEXISCAN) injection 0.4 mg Once PRN   0.9 % sodium chloride infusion Once   sodium chloride flush 0.9 % injection 10 mL PRN   metoprolol (LOPRESSOR) injection 2.5 mg PRN   nitroGLYCERIN (NITROSTAT) SL tablet 0.4 mg Q5 Min PRN   buprenorphine-naloxone (SUBOXONE) 2-0.5 MG SL tablet 4 tablet Daily   ipratropium-albuterol (DUONEB) nebulizer solution 1 ampule Q4H PRN   predniSONE (DELTASONE) tablet 40 mg Daily   albuterol sulfate  (90 Base) MCG/ACT inhaler 2 puff Q4H PRN   amLODIPine (NORVASC) tablet 5 mg Nightly   lisinopril (PRINIVIL;ZESTRIL) tablet 2.5 mg Daily   metoprolol succinate (TOPROL XL) extended release tablet 25 mg Daily   sodium chloride flush 0.9 % injection 10 mL 2 times per day   magnesium hydroxide (MILK OF MAGNESIA) 400 MG/5ML suspension 30 mL Daily PRN   ondansetron (ZOFRAN) injection 4 mg Q6H PRN   sodium chloride

## 2018-08-06 NOTE — PROGRESS NOTES
chest was ordered will wait for the results and a CT is negative for pulmonary embolism she can be discharged  Stress test was ordered by cardiology and pending results     Miguel Gama MD  8/6/2018 1:30 PM     Please note that this chart was generated using voice recognition Dragon dictation software. Although every effort was made to ensure the accuracy of this automated transcription, some errors in transcription may have occurred.

## 2018-08-07 NOTE — DISCHARGE SUMMARY
CDU Discharge Summary        Patient:  Sierra Olguin  YOB: 1955    MRN: [id number]   Acct: [de-identified]    Primary Care Physician: Leopold Bolder    Admit date:  2018  1:19 PM  Discharge date: 2018  6:41 PM    Discharge Diagnoses:     Acute onset of shortness of breath accompanied by her palpitations, etiology unknown, cardiology and pulmonology consulted    Acute onset of leukocytosis, etiology unknown, likely reactive     Chronic nicotine dependence secondary cigarette smoking, tobacco cessation education provided patient    Follow-up:  Call today/tomorrow for a follow up appointment with Leopold Bolder , pulmonology or return to the Emergency Room with worsening symptoms    Stressed to patient the importance of following up with primary care doctor for further workup/management of symptoms. Pt verbalizes understanding and agrees with plan. Discharge Medications:  Changes to medications: Prednisone 40mg PO x5 days, started on Spiriva Inhaler 1 puff daily        Emi Daniels Medication Instructions [facility]    Printed on:18   Medication Information                      acetaminophen (APAP EXTRA STRENGTH) 500 MG tablet  Take 2 tablets by mouth every 8 hours as needed for Pain             albuterol sulfate HFA (PROVENTIL HFA) 108 (90 Base) MCG/ACT inhaler  Inhale 1-2 puffs into the lungs every 4 hours as needed for Wheezing or Shortness of Breath (Space out to every 6 hours as symptoms improve) Space out to every 6 hours as symptoms improve. amLODIPine (NORVASC) 5 MG tablet  Take 5 mg by mouth daily              cyclobenzaprine (FLEXERIL) 5 MG tablet  Take 5 mg by mouth 2 times daily as needed for Muscle spasms             gabapentin (NEURONTIN) 300 MG capsule  Take 300 mg by mouth 3 times daily. Richie Counter              lisinopril (PRINIVIL;ZESTRIL) 2.5 MG tablet  Take 2.5 mg by mouth daily              metoprolol succinate (TOPROL XL) 25 MG extended release tablet  Take 25 mg by mouth daily              Multiple Vitamins-Minerals (THERAPEUTIC MULTIVITAMIN-MINERALS) tablet  Take 1 tablet by mouth daily             predniSONE (DELTASONE) 50 MG tablet  Take 1 tablet by mouth daily for 5 days             SUBOXONE 8-2 MG FILM               tiotropium (SPIRIVA HANDIHALER) 18 MCG inhalation capsule  Inhale 1 capsule into the lungs daily                 Diet:    , Advance as tolerated     Activity:  As tolerated    Consultants: IP CONSULT TO CARDIOLOGY  IP CONSULT TO PULMONOLOGY  IP CONSULT TO IV TEAM    Procedures:  Cardiac stress test    Diagnostic Test:   Results for orders placed or performed during the hospital encounter of 08/04/18   Troponin   Result Value Ref Range    Troponin T <0.03 <0.03 ng/mL    Troponin Interp         Troponin   Result Value Ref Range    Troponin T <0.03 <0.03 ng/mL    Troponin Interp         DRUG SCREEN MULTI URINE   Result Value Ref Range    Amphetamine Screen, Ur NEGATIVE NEG    Barbiturate Screen, Ur NEGATIVE NEG    Benzodiazepine Screen, Urine NEGATIVE NEG    Cocaine Metabolite, Urine NEGATIVE NEG    Methadone Screen, Urine NEGATIVE NEG    Opiates, Urine NEGATIVE NEG    Phencyclidine, Urine NEGATIVE NEG    Propoxyphene, Urine NOT REPORTED NEG    Cannabinoid Scrn, Ur NEGATIVE NEG    Oxycodone Screen, Ur NEGATIVE NEG    Methamphetamine, Urine NOT REPORTED NEG    Tricyclic Antidepressants, Urine NOT REPORTED NEG    MDMA, Urine NOT REPORTED NEG    Buprenorphine Urine NOT REPORTED NEG    Test Information       Assay provides medical screening only.   The absence of expected drug(s) and/or   D-Dimer, Quantitative   Result Value Ref Range    D-Dimer, Quant 0.67 mg/L FEU   HEMOGLOBIN A1C   Result Value Ref Range    Hemoglobin A1C 5.4 4.0 - 6.0 %    Estimated Avg Glucose 108 mg/dL   Brain Natriuretic Peptide   Result Value Ref Range    Pro- <300 pg/mL    BNP Interpretation         Troponin   Result Value Ref Range    Troponin T <0.03 <0.03 ng/mL Troponin Interp         Troponin   Result Value Ref Range    Troponin T <0.03 <0.03 ng/mL    Troponin Interp         Troponin   Result Value Ref Range    Troponin T <0.03 <0.03 ng/mL    Troponin Interp         EKG 12 Lead   Result Value Ref Range    Ventricular Rate 86 BPM    Atrial Rate 86 BPM    P-R Interval 148 ms    QRS Duration 68 ms    Q-T Interval 386 ms    QTc Calculation (Bazett) 461 ms    P Axis 47 degrees    R Axis 49 degrees    T Axis 29 degrees   EKG 12 lead   Result Value Ref Range    Ventricular Rate 76 BPM    Atrial Rate 76 BPM    P-R Interval 148 ms    QRS Duration 60 ms    Q-T Interval 378 ms    QTc Calculation (Bazett) 425 ms    R Axis 112 degrees    T Axis 164 degrees     Xr Chest Standard (2 Vw)    Result Date: 8/4/2018  EXAMINATION: TWO VIEWS OF THE CHEST 8/4/2018 9:40 am COMPARISON: May 12, 2018. March 30, 2017. HISTORY: ORDERING SYSTEM PROVIDED HISTORY: shortness of breath TECHNOLOGIST PROVIDED HISTORY: Reason for exam:->shortness of breath FINDINGS: Frontal view of the chest demonstrates no lines or tubes. Normal cardiomediastinal silhouette. The lungs show basilar atelectasis. No pleural effusions. Mild pulmonary edema. No suspicious pulmonary nodules. No acute osseous abnormality. Linear presumed foreign bodies project over the bilateral lung apices of unclear etiology. Findings have been stable since March 2017. 1. Mild pulmonary edema with basilar atelectasis. Ct Chest Pulmonary Embolism W Contrast    Result Date: 8/6/2018  EXAMINATION: CTA OF THE CHEST 8/6/2018 5:49 pm TECHNIQUE: CTA of the chest was performed after the administration of intravenous contrast.  Multiplanar reformatted images are provided for review. MIP images are provided for review. Dose modulation, iterative reconstruction, and/or weight based adjustment of the mA/kV was utilized to reduce the radiation dose to as low as reasonably achievable.  COMPARISON: CT abdomen pelvis 03/26/2018 ; chest September 2017; 14 September 2015 HISTORY: Screening. Positive family history of breast cancer ; patient's mother diagnosed with breast cancer at 59. Negative history of hormonal replacement therapy. No prior breast interventions. FINDINGS: Breast parenchyma is heterogeneously dense which somewhat lowers the sensitivity of the study and may obscure small masses. Area of asymmetry is present in the lower half of the left breast posterior 1/3 on MLO projection only with additional mammographic workup advised. Full workup may require ultrasonography. No skin thickening, nipple contour changes or malignant type microcalcifications are noted. Asymmetry inferior aspect of the left breast posterior 1/3 seen on MLO projection with additional mammographic workup advised. BREAST DENSITY SUMMARY C: The breasts are heterogeneously dense which may obscure small masses. Your mammogram demonstrates that you have dense breast tissue, which could hide abnormalities. Dense breast tissue in and of itself is a relatively common condition. Therefore, this information is not provided to cause undue concern, rather, it is to raise your awareness and promote discussion with your health care provider regarding the presence of dense breast tissue in addition to other risk factors. BIRADS: BIRADS - CATEGORY 0 Additional imaging is recommended at this time. OVERALL ASSESSMENT - INCOMPLETE:NEED ADDITIONAL IMAGING EVALUATION. Nm Myocardial Spect Rest Exercise Or Rx    Result Date: 8/6/2018  EXAMINATION: MYOCARDIAL PERFUSION IMAGING 8/6/2018 12:15 pm TECHNIQUE: Rest dose:  15.8 mCi Tc-99m sestamibi intravenously Stress dose:  41  mCi Tc-99m sestamibi intravenously Under cardiology supervision, 0.4 mg Lexiscan was infused intravenously prior to injection of the stress dose. SPECT imaging was acquired following injection of the sestamibi. ECG gating was obtained following the stress acquisition.  COMPARISON: 04/25/2014 HISTORY: ORDERING SYSTEM PROVIDED HISTORY: CHEST PAIN, ACUTE CORONARY SYNDROME SUSPECT TECHNOLOGIST PROVIDED HISTORY: Ordering Physician Provided Reason for Exam: chest pain, acute coronary syndrome suspect, Shortness of Breath Additional signs and symptoms:  T wave inversions Relevant Medical/Surgical History: COPD, GERD, Cigarette smoker 55-year-old female with chest pain ; suspected acute coronary syndrome FINDINGS: The patient achieved a maximum heart rate of 127 beats per minute, 81 % of the maximum age predicted heart rate of 155 beats per minute. Perfusion: There is no scintigraphic evidence for a reversible or fixed perfusion defect to suggest reversible ischemia or infarct. Function: The gated SPECT data demonstrates normal left ventricular size and normal wall motion. Left ventricular ejection fraction:  Greater than 70% TID score:  0.93  (Threshold value of 1.39 is used for Lexiscan stress with Tc-99m). There is no stress-induced cavitary dilatation to suggest compensated triple vessel disease. End diastolic volume:  67AR Scores are visually adjusted to account for potential artifact. Summed stress score:  0 Summed rest score:  0 Summed reversibility score:  0     1. No definitive scintigraphic evidence for reversible ischemia or infarct. 2. Left ventricular ejection fraction of greater than 70%. 3.  Please see report for EKG portion of the examination which will be performed separately by physician from cardiology. Risk stratification:  Low risk Note:  Risk stratification incorporates both clinical history and test results. Final risk determination is the responsibility of the ordering provider as history and other test results may increase or decrease the risk stratification reported for this examination. Risk stratification criteria are adapted from \"Noninvasive Risk Stratification\" criteria from Matthieu Ruiz.   [place], ACC/AATS/AHA/ASE/ASNC/SCAI/SCCT/STS 2017 Appropriate Use Criteria For Coronary Revascularization in Patients With Stable Ischemic Heart Disease [facility] Volume 69, Issue 17, May 2017 High risk (>3% annual death or MI) 1. Severe resting LV dysfunction (LVEF >35%) not readily explained by non coronary causes 2. Resting perfusion abnormalities greater than 10% of the myocardium in patients without prior history or evidence of MI 3. Stress-induced perfusion abnormalities encumbering greater than or equal to 10% myocardium or stress segmental scores indicating multiple vascular territories with abnormalities 4. Stress-induced LV dilatation (TID ratio greater than 1.19 for exercise and greater than 1.39 for regadenoson) Intermediate risk (1% to 3% annual death or MI) 1. Mild/moderate resting LV dysfunction (LVEF 35% to 49%) not readily explained by non coronary causes. 2.  Resting perfusion abnormalities in 5%-9.9% of the myocardium in patients without a history or prior evidence of MI 3. Stress-induced perfusion abnormality encumbering 5%-9.9% of the myocardium or stress segmental scores indicating 1 vascular territory with abnormalities but without LV dilation 4. Small wall motion abnormality involving 1-2 segments and only 1 coronary bed. Low Risk (Less than 1% annual death or MI) 1. Normal or small myocardial perfusion defect at rest or with stress encumbering less than 5% of the myocardium. Physical Exam:    General appearance - NAD, AOx 3  Lungs -CTAB, no R/R/R  Heart - RRR, no M/R/G  Abdomen - Soft, NT/ND  Neurological:  MAEx4, No focal motor deficit, sensory loss  Extremities - Cap refil <2 sec in all ext., no edema  Skin -warm, dry      Hospital Course:  Clinical course has improved, labs and imaging reviewed. Emi Daniels originally presented to the hospital on 8/4/2018  1:19 PM. with acute onset of SOB and heart palpitations. At that time it was determined that She required further observation and evaluation by cardiology and pulmonology.  She was admitted and labs and imaging were followed

## 2018-08-10 ENCOUNTER — HOSPITAL ENCOUNTER (OUTPATIENT)
Dept: PHYSICAL THERAPY | Age: 63
Setting detail: THERAPIES SERIES
Discharge: HOME | End: 2018-08-10
Payer: MEDICARE

## 2018-08-10 NOTE — PROCEDURES
[facility]                                PULMONARY FUNCTION    PATIENT NAME: Jennifer Muniz                     :        1955  MED REC NO:   [id number]                             ROOM:       0263  ACCOUNT NO:   [de-identified]                           ADMIT DATE: 2018  PROVIDER:     Mark Morgan    DATE OF PROCEDURE:  2018The patient's flow volume loop is  restrictive. FEV1 at 53% predicted, FVC 55% predicted. No bronchodilator  change. FEV1/FVC ratio is 76. [id number] is 40% predicted. Lung volumes  show % predicted, TLC 94% predicted. Diffusion capacity is decreased  at 49% predicted. FINAL IMPRESSION:  The study shows a restrictive ventilatory dysfunction of  moderate severity , air trapping noted, and diffusion capacity is  severely reduced. This raises the concern of an underlying obstructive  process in addition to a restrictive lung disease. Clinical correlation is  advised.         Modesto Harper    D: 2018 21:48:16       T: 2018 23:25:16     [email or web address]  Job#: [id number]     Doc#: [id number]    CC:

## 2018-08-11 ENCOUNTER — APPOINTMENT (OUTPATIENT)
Dept: GENERAL RADIOLOGY | Age: 63
DRG: 140 | End: 2018-08-11
Payer: MEDICARE

## 2018-08-11 ENCOUNTER — APPOINTMENT (OUTPATIENT)
Dept: CT IMAGING | Age: 63
DRG: 140 | End: 2018-08-11
Payer: MEDICARE

## 2018-08-11 ENCOUNTER — HOSPITAL ENCOUNTER (INPATIENT)
Age: 63
LOS: 1 days | Discharge: HOME | DRG: 140 | End: 2018-08-12
Attending: EMERGENCY MEDICINE | Admitting: INTERNAL MEDICINE
Payer: MEDICARE

## 2018-08-11 DIAGNOSIS — J44.1 COPD EXACERBATION (HCC): Primary | ICD-10-CM

## 2018-08-11 PROBLEM — E87.20 LACTIC ACIDOSIS: Status: ACTIVE | Noted: 2018-08-11

## 2018-08-11 PROBLEM — R73.9 HYPERGLYCEMIA: Status: ACTIVE | Noted: 2018-08-11

## 2018-08-11 PROBLEM — I10 ESSENTIAL HYPERTENSION: Status: ACTIVE | Noted: 2018-08-11

## 2018-08-11 LAB
ABSOLUTE EOS #: <0.03 K/UL (ref 0–0.44)
ABSOLUTE IMMATURE GRANULOCYTE: 0.47 K/UL (ref 0–0.3)
ABSOLUTE LYMPH #: 1.1 K/UL (ref 1.1–3.7)
ABSOLUTE MONO #: 0.58 K/UL (ref 0.1–1.2)
ALBUMIN SERPL-MCNC: 4 G/DL (ref 3.5–5.2)
ALBUMIN/GLOBULIN RATIO: 1 (ref 1–2.5)
ALLEN TEST: ABNORMAL
ALLEN TEST: ABNORMAL
ALP BLD-CCNC: 56 U/L (ref 35–104)
ALT SERPL-CCNC: 18 U/L (ref 5–33)
ANION GAP SERPL CALCULATED.3IONS-SCNC: 18 MMOL/L (ref 9–17)
ANION GAP: 17 MMOL/L (ref 7–16)
AST SERPL-CCNC: 40 U/L
BASOPHILS # BLD: 1 % (ref 0–2)
BASOPHILS ABSOLUTE: 0.07 K/UL (ref 0–0.2)
BETA-HYDROXYBUTYRATE: 0.25 MMOL/L (ref 0.02–0.27)
BILIRUB SERPL-MCNC: 0.34 MG/DL (ref 0.3–1.2)
BNP INTERPRETATION: ABNORMAL
BUN BLDV-MCNC: 11 MG/DL (ref 8–23)
BUN/CREAT BLD: ABNORMAL (ref 9–20)
CALCIUM SERPL-MCNC: 9.2 MG/DL (ref 8.6–10.4)
CARBOXYHEMOGLOBIN: 2.4 % (ref 0–5)
CHLORIDE BLD-SCNC: 100 MMOL/L (ref 98–107)
CO2: 18 MMOL/L (ref 20–31)
CREAT SERPL-MCNC: 0.9 MG/DL (ref 0.5–0.9)
DIFFERENTIAL TYPE: ABNORMAL
DIRECT EXAM: NORMAL
EOSINOPHILS RELATIVE PERCENT: 0 % (ref 1–4)
FIO2: ABNORMAL
FIO2: ABNORMAL
GFR AFRICAN AMERICAN: >60 ML/MIN
GFR NON-AFRICAN AMERICAN: >60 ML/MIN
GFR NON-AFRICAN AMERICAN: >60 ML/MIN
GFR SERPL CREATININE-BSD FRML MDRD: >60 ML/MIN
GFR SERPL CREATININE-BSD FRML MDRD: ABNORMAL ML/MIN/{1.73_M2}
GFR SERPL CREATININE-BSD FRML MDRD: ABNORMAL ML/MIN/{1.73_M2}
GFR SERPL CREATININE-BSD FRML MDRD: NORMAL ML/MIN/{1.73_M2}
GLUCOSE BLD-MCNC: 185 MG/DL (ref 65–105)
GLUCOSE BLD-MCNC: 390 MG/DL (ref 74–100)
GLUCOSE BLD-MCNC: 478 MG/DL (ref 70–99)
HCO3 VENOUS: 20.8 MMOL/L (ref 24–30)
HCO3 VENOUS: 22.4 MMOL/L (ref 22–29)
HCT VFR BLD CALC: 47.2 % (ref 36.3–47.1)
HEMOGLOBIN: 14.6 G/DL (ref 11.9–15.1)
IMMATURE GRANULOCYTES: 4 %
LACTIC ACID, SEPSIS WHOLE BLOOD: 8.8 MMOL/L (ref 0.5–1.9)
LACTIC ACID, SEPSIS: ABNORMAL MMOL/L (ref 0.5–1.9)
LACTIC ACID, WHOLE BLOOD: 5.6 MMOL/L (ref 0.7–2.1)
LACTIC ACID, WHOLE BLOOD: 6.1 MMOL/L (ref 0.7–2.1)
LACTIC ACID: ABNORMAL MMOL/L
LYMPHOCYTES # BLD: 10 % (ref 24–43)
Lab: NORMAL
MCH RBC QN AUTO: 32.2 PG (ref 25.2–33.5)
MCHC RBC AUTO-ENTMCNC: 30.9 G/DL (ref 28.4–34.8)
MCV RBC AUTO: 104.2 FL (ref 82.6–102.9)
METHEMOGLOBIN: ABNORMAL % (ref 0–1.5)
MODE: ABNORMAL
MODE: ABNORMAL
MONOCYTES # BLD: 5 % (ref 3–12)
NEGATIVE BASE EXCESS, VEN: 3 (ref 0–2)
NEGATIVE BASE EXCESS, VEN: 3.8 MMOL/L (ref 0–2)
NOTIFICATION TIME: ABNORMAL
NOTIFICATION: ABNORMAL
NRBC AUTOMATED: 0 PER 100 WBC
O2 DEVICE/FLOW/%: ABNORMAL
O2 DEVICE/FLOW/%: ABNORMAL
O2 SAT, VEN: 76 % (ref 60–85)
O2 SAT, VEN: 92.3 % (ref 60–85)
OXYHEMOGLOBIN: ABNORMAL % (ref 95–98)
PATIENT TEMP: 37
PATIENT TEMP: ABNORMAL
PCO2, VEN, TEMP ADJ: ABNORMAL MMHG (ref 39–55)
PCO2, VEN: 38.6 (ref 39–55)
PCO2, VEN: 41.7 MM HG (ref 41–51)
PDW BLD-RTO: 14.6 % (ref 11.8–14.4)
PEEP/CPAP: ABNORMAL
PH VENOUS: 7.34 (ref 7.32–7.43)
PH VENOUS: 7.35 (ref 7.32–7.42)
PH, VEN, TEMP ADJ: ABNORMAL (ref 7.32–7.42)
PLATELET # BLD: 418 K/UL (ref 138–453)
PLATELET ESTIMATE: ABNORMAL
PMV BLD AUTO: 9.4 FL (ref 8.1–13.5)
PO2, VEN, TEMP ADJ: ABNORMAL MMHG (ref 30–50)
PO2, VEN: 42.9 MM HG (ref 30–50)
PO2, VEN: 56.5 (ref 30–50)
POC CHLORIDE: 106 MMOL/L (ref 98–107)
POC CREATININE: 0.75 MG/DL (ref 0.51–1.19)
POC HEMATOCRIT: 48 % (ref 36–46)
POC HEMOGLOBIN: 16.5 G/DL (ref 12–16)
POC IONIZED CALCIUM: 1.29 MMOL/L (ref 1.15–1.33)
POC LACTIC ACID: 7.47 MMOL/L (ref 0.56–1.39)
POC PCO2 TEMP: ABNORMAL MM HG
POC PH TEMP: ABNORMAL
POC PO2 TEMP: ABNORMAL MM HG
POC POTASSIUM: 3.8 MMOL/L (ref 3.5–4.5)
POC SODIUM: 145 MMOL/L (ref 138–146)
POSITIVE BASE EXCESS, VEN: ABNORMAL (ref 0–3)
POSITIVE BASE EXCESS, VEN: ABNORMAL MMOL/L (ref 0–2)
POTASSIUM SERPL-SCNC: 4.1 MMOL/L (ref 3.7–5.3)
POTASSIUM SERPL-SCNC: 5.5 MMOL/L (ref 3.7–5.3)
PRO-BNP: 633 PG/ML
PSV: ABNORMAL
PT. POSITION: ABNORMAL
RBC # BLD: 4.53 M/UL (ref 3.95–5.11)
RBC # BLD: ABNORMAL 10*6/UL
RESPIRATORY RATE: ABNORMAL
SAMPLE SITE: ABNORMAL
SAMPLE SITE: ABNORMAL
SEG NEUTROPHILS: 81 % (ref 36–65)
SEGMENTED NEUTROPHILS ABSOLUTE COUNT: 9.16 K/UL (ref 1.5–8.1)
SET RATE: ABNORMAL
SODIUM BLD-SCNC: 136 MMOL/L (ref 135–144)
SPECIMEN DESCRIPTION: NORMAL
STATUS: NORMAL
TEXT FOR RESPIRATORY: ABNORMAL
TOTAL CO2, VENOUS: 24 MMOL/L (ref 23–30)
TOTAL HB: ABNORMAL G/DL (ref 12–16)
TOTAL PROTEIN: 8.2 G/DL (ref 6.4–8.3)
TOTAL RATE: ABNORMAL
TROPONIN INTERP: NORMAL
TROPONIN INTERP: NORMAL
TROPONIN T: <0.03 NG/ML
TROPONIN T: <0.03 NG/ML
VT: ABNORMAL
WBC # BLD: 11.4 K/UL (ref 3.5–11.3)
WBC # BLD: ABNORMAL 10*3/UL

## 2018-08-11 PROCEDURE — 83036 HEMOGLOBIN GLYCOSYLATED A1C: CPT

## 2018-08-11 PROCEDURE — 85025 COMPLETE CBC W/AUTO DIFF WBC: CPT

## 2018-08-11 PROCEDURE — 71046 X-RAY EXAM CHEST 2 VIEWS: CPT

## 2018-08-11 PROCEDURE — 1200000000 HC SEMI PRIVATE

## 2018-08-11 PROCEDURE — 84132 ASSAY OF SERUM POTASSIUM: CPT

## 2018-08-11 PROCEDURE — 93005 ELECTROCARDIOGRAM TRACING: CPT

## 2018-08-11 PROCEDURE — 71260 CT THORAX DX C+: CPT

## 2018-08-11 PROCEDURE — 83880 ASSAY OF NATRIURETIC PEPTIDE: CPT

## 2018-08-11 PROCEDURE — 82565 ASSAY OF CREATININE: CPT

## 2018-08-11 PROCEDURE — 6370000000 HC RX 637 (ALT 250 FOR IP): Performed by: STUDENT IN AN ORGANIZED HEALTH CARE EDUCATION/TRAINING PROGRAM

## 2018-08-11 PROCEDURE — 82947 ASSAY GLUCOSE BLOOD QUANT: CPT

## 2018-08-11 PROCEDURE — 6360000002 HC RX W HCPCS: Performed by: STUDENT IN AN ORGANIZED HEALTH CARE EDUCATION/TRAINING PROGRAM

## 2018-08-11 PROCEDURE — 2580000003 HC RX 258: Performed by: STUDENT IN AN ORGANIZED HEALTH CARE EDUCATION/TRAINING PROGRAM

## 2018-08-11 PROCEDURE — 82010 KETONE BODYS QUAN: CPT

## 2018-08-11 PROCEDURE — 6360000004 HC RX CONTRAST MEDICATION: Performed by: STUDENT IN AN ORGANIZED HEALTH CARE EDUCATION/TRAINING PROGRAM

## 2018-08-11 PROCEDURE — 94664 DEMO&/EVAL PT USE INHALER: CPT

## 2018-08-11 PROCEDURE — 82435 ASSAY OF BLOOD CHLORIDE: CPT

## 2018-08-11 PROCEDURE — 83605 ASSAY OF LACTIC ACID: CPT

## 2018-08-11 PROCEDURE — 85014 HEMATOCRIT: CPT

## 2018-08-11 PROCEDURE — 84484 ASSAY OF TROPONIN QUANT: CPT

## 2018-08-11 PROCEDURE — 82803 BLOOD GASES ANY COMBINATION: CPT

## 2018-08-11 PROCEDURE — 80053 COMPREHEN METABOLIC PANEL: CPT

## 2018-08-11 PROCEDURE — 84295 ASSAY OF SERUM SODIUM: CPT

## 2018-08-11 PROCEDURE — 36415 COLL VENOUS BLD VENIPUNCTURE: CPT

## 2018-08-11 PROCEDURE — 82330 ASSAY OF CALCIUM: CPT

## 2018-08-11 PROCEDURE — 99285 EMERGENCY DEPT VISIT HI MDM: CPT

## 2018-08-11 PROCEDURE — 94640 AIRWAY INHALATION TREATMENT: CPT

## 2018-08-11 PROCEDURE — 82805 BLOOD GASES W/O2 SATURATION: CPT

## 2018-08-11 PROCEDURE — 87804 INFLUENZA ASSAY W/OPTIC: CPT

## 2018-08-11 PROCEDURE — 87040 BLOOD CULTURE FOR BACTERIA: CPT

## 2018-08-11 RX ORDER — ALBUTEROL SULFATE 90 UG/1
2 AEROSOL, METERED RESPIRATORY (INHALATION)
Status: DISCONTINUED | OUTPATIENT
Start: 2018-08-11 | End: 2018-08-11

## 2018-08-11 RX ORDER — GABAPENTIN 300 MG/1
300 CAPSULE ORAL 3 TIMES DAILY
Status: DISCONTINUED | OUTPATIENT
Start: 2018-08-11 | End: 2018-08-12 | Stop reason: HOSPADM

## 2018-08-11 RX ORDER — LISINOPRIL 2.5 MG/1
2.5 TABLET ORAL DAILY
Status: DISCONTINUED | OUTPATIENT
Start: 2018-08-11 | End: 2018-08-12

## 2018-08-11 RX ORDER — ALBUTEROL SULFATE 2.5 MG/3ML
5 SOLUTION RESPIRATORY (INHALATION)
Status: DISCONTINUED | OUTPATIENT
Start: 2018-08-11 | End: 2018-08-11

## 2018-08-11 RX ORDER — 0.9 % SODIUM CHLORIDE 0.9 %
1000 INTRAVENOUS SOLUTION INTRAVENOUS ONCE
Status: COMPLETED | OUTPATIENT
Start: 2018-08-11 | End: 2018-08-11

## 2018-08-11 RX ORDER — ONDANSETRON 2 MG/ML
4 INJECTION INTRAMUSCULAR; INTRAVENOUS EVERY 6 HOURS PRN
Status: DISCONTINUED | OUTPATIENT
Start: 2018-08-11 | End: 2018-08-12 | Stop reason: HOSPADM

## 2018-08-11 RX ORDER — ALBUTEROL SULFATE 90 UG/1
1 AEROSOL, METERED RESPIRATORY (INHALATION) EVERY 4 HOURS PRN
Status: DISCONTINUED | OUTPATIENT
Start: 2018-08-11 | End: 2018-08-12 | Stop reason: HOSPADM

## 2018-08-11 RX ORDER — DEXTROSE MONOHYDRATE 25 G/50ML
12.5 INJECTION, SOLUTION INTRAVENOUS PRN
Status: DISCONTINUED | OUTPATIENT
Start: 2018-08-11 | End: 2018-08-12 | Stop reason: HOSPADM

## 2018-08-11 RX ORDER — SODIUM CHLORIDE 9 MG/ML
INJECTION, SOLUTION INTRAVENOUS CONTINUOUS
Status: DISCONTINUED | OUTPATIENT
Start: 2018-08-11 | End: 2018-08-12 | Stop reason: HOSPADM

## 2018-08-11 RX ORDER — AMLODIPINE BESYLATE 5 MG/1
5 TABLET ORAL DAILY
Status: DISCONTINUED | OUTPATIENT
Start: 2018-08-11 | End: 2018-08-12 | Stop reason: HOSPADM

## 2018-08-11 RX ORDER — SODIUM CHLORIDE 0.9 % (FLUSH) 0.9 %
10 SYRINGE (ML) INJECTION EVERY 12 HOURS SCHEDULED
Status: DISCONTINUED | OUTPATIENT
Start: 2018-08-11 | End: 2018-08-12 | Stop reason: HOSPADM

## 2018-08-11 RX ORDER — LEVOFLOXACIN 5 MG/ML
750 INJECTION, SOLUTION INTRAVENOUS ONCE
Status: DISCONTINUED | OUTPATIENT
Start: 2018-08-11 | End: 2018-08-12

## 2018-08-11 RX ORDER — IPRATROPIUM BROMIDE AND ALBUTEROL SULFATE 2.5; .5 MG/3ML; MG/3ML
1 SOLUTION RESPIRATORY (INHALATION)
Status: DISCONTINUED | OUTPATIENT
Start: 2018-08-11 | End: 2018-08-12 | Stop reason: HOSPADM

## 2018-08-11 RX ORDER — SODIUM CHLORIDE 0.9 % (FLUSH) 0.9 %
10 SYRINGE (ML) INJECTION PRN
Status: DISCONTINUED | OUTPATIENT
Start: 2018-08-11 | End: 2018-08-12 | Stop reason: HOSPADM

## 2018-08-11 RX ORDER — NICOTINE 21 MG/24HR
1 PATCH, TRANSDERMAL 24 HOURS TRANSDERMAL DAILY
Status: DISCONTINUED | OUTPATIENT
Start: 2018-08-11 | End: 2018-08-12 | Stop reason: HOSPADM

## 2018-08-11 RX ORDER — METOPROLOL SUCCINATE 25 MG/1
25 TABLET, EXTENDED RELEASE ORAL DAILY
Status: DISCONTINUED | OUTPATIENT
Start: 2018-08-11 | End: 2018-08-12 | Stop reason: HOSPADM

## 2018-08-11 RX ORDER — FAMOTIDINE 20 MG/1
20 TABLET, FILM COATED ORAL 2 TIMES DAILY
Status: DISCONTINUED | OUTPATIENT
Start: 2018-08-11 | End: 2018-08-12 | Stop reason: HOSPADM

## 2018-08-11 RX ORDER — METHYLPREDNISOLONE SODIUM SUCCINATE 40 MG/ML
40 INJECTION, POWDER, LYOPHILIZED, FOR SOLUTION INTRAMUSCULAR; INTRAVENOUS EVERY 8 HOURS
Status: DISCONTINUED | OUTPATIENT
Start: 2018-08-11 | End: 2018-08-12 | Stop reason: HOSPADM

## 2018-08-11 RX ORDER — IPRATROPIUM BROMIDE AND ALBUTEROL SULFATE 2.5; .5 MG/3ML; MG/3ML
1 SOLUTION RESPIRATORY (INHALATION)
Status: DISCONTINUED | OUTPATIENT
Start: 2018-08-11 | End: 2018-08-11

## 2018-08-11 RX ORDER — LEVOFLOXACIN 5 MG/ML
500 INJECTION, SOLUTION INTRAVENOUS EVERY 24 HOURS
Status: DISCONTINUED | OUTPATIENT
Start: 2018-08-05 | End: 2018-08-11

## 2018-08-11 RX ORDER — M-VIT,TX,IRON,MINS/CALC/FOLIC 27MG-0.4MG
1 TABLET ORAL DAILY
Status: DISCONTINUED | OUTPATIENT
Start: 2018-08-12 | End: 2018-08-12 | Stop reason: HOSPADM

## 2018-08-11 RX ORDER — ACETAMINOPHEN 325 MG/1
650 TABLET ORAL EVERY 4 HOURS PRN
Status: DISCONTINUED | OUTPATIENT
Start: 2018-08-11 | End: 2018-08-12 | Stop reason: HOSPADM

## 2018-08-11 RX ORDER — DEXTROSE MONOHYDRATE 50 MG/ML
100 INJECTION, SOLUTION INTRAVENOUS PRN
Status: DISCONTINUED | OUTPATIENT
Start: 2018-08-11 | End: 2018-08-12 | Stop reason: HOSPADM

## 2018-08-11 RX ORDER — 0.9 % SODIUM CHLORIDE 0.9 %
1000 INTRAVENOUS SOLUTION INTRAVENOUS ONCE
Status: COMPLETED | OUTPATIENT
Start: 2018-08-11 | End: 2018-08-12

## 2018-08-11 RX ORDER — NICOTINE POLACRILEX 4 MG
15 LOZENGE BUCCAL PRN
Status: DISCONTINUED | OUTPATIENT
Start: 2018-08-11 | End: 2018-08-12 | Stop reason: HOSPADM

## 2018-08-11 RX ORDER — CYCLOBENZAPRINE HCL 5 MG
5 TABLET ORAL 2 TIMES DAILY PRN
Status: DISCONTINUED | OUTPATIENT
Start: 2018-08-11 | End: 2018-08-12 | Stop reason: HOSPADM

## 2018-08-11 RX ORDER — LEVOFLOXACIN 5 MG/ML
500 INJECTION, SOLUTION INTRAVENOUS EVERY 24 HOURS
Status: DISCONTINUED | OUTPATIENT
Start: 2018-08-12 | End: 2018-08-12

## 2018-08-11 RX ADMIN — ALBUTEROL SULFATE 5 MG: 5 SOLUTION RESPIRATORY (INHALATION) at 14:35

## 2018-08-11 RX ADMIN — SODIUM CHLORIDE 1000 ML: 9 INJECTION, SOLUTION INTRAVENOUS at 22:35

## 2018-08-11 RX ADMIN — IOPAMIDOL 75 ML: 755 INJECTION, SOLUTION INTRAVENOUS at 18:42

## 2018-08-11 RX ADMIN — IPRATROPIUM BROMIDE 0.5 MG: 0.5 SOLUTION RESPIRATORY (INHALATION) at 14:35

## 2018-08-11 RX ADMIN — INSULIN HUMAN 10 UNITS: 100 INJECTION, SOLUTION PARENTERAL at 17:37

## 2018-08-11 RX ADMIN — GABAPENTIN 300 MG: 300 CAPSULE ORAL at 21:14

## 2018-08-11 RX ADMIN — FAMOTIDINE 20 MG: 20 TABLET, FILM COATED ORAL at 21:14

## 2018-08-11 RX ADMIN — SODIUM CHLORIDE 1000 ML: 9 INJECTION, SOLUTION INTRAVENOUS at 17:36

## 2018-08-11 RX ADMIN — AMLODIPINE BESYLATE 5 MG: 5 TABLET ORAL at 21:14

## 2018-08-11 RX ADMIN — MOMETASONE FUROATE AND FORMOTEROL FUMARATE DIHYDRATE 2 PUFF: 200; 5 AEROSOL RESPIRATORY (INHALATION) at 21:04

## 2018-08-11 RX ADMIN — IPRATROPIUM BROMIDE AND ALBUTEROL SULFATE 1 AMPULE: .5; 3 SOLUTION RESPIRATORY (INHALATION) at 21:04

## 2018-08-11 RX ADMIN — METOPROLOL SUCCINATE 25 MG: 25 TABLET, FILM COATED, EXTENDED RELEASE ORAL at 21:14

## 2018-08-11 RX ADMIN — LISINOPRIL 2.5 MG: 2.5 TABLET ORAL at 21:14

## 2018-08-11 RX ADMIN — SODIUM CHLORIDE: 9 INJECTION, SOLUTION INTRAVENOUS at 21:18

## 2018-08-11 RX ADMIN — METHYLPREDNISOLONE SODIUM SUCCINATE 40 MG: 40 INJECTION, POWDER, FOR SOLUTION INTRAMUSCULAR; INTRAVENOUS at 21:14

## 2018-08-11 NOTE — H&P
a few years back    Review of Systems   Constitutional: Negative for chills and fever. HENT: Negative. Respiratory: Positive for cough, sputum production, shortness of breath and wheezing. Negative for hemoptysis. Cardiovascular: Positive for palpitations, orthopnea and PND. Negative for leg swelling. No JVP. HEPATOJUGULAR REFLEX NEGATIVE     Gastrointestinal: Negative for nausea and vomiting. Bloated abdomen   Genitourinary: Negative. Skin: Negative. PMH significant for  COPD  Hep C  Depression  Osteoarthritis  Essential hypertension--patient on lisinopril 2.5 mg, Norvasc 5 mg, Toprol xl 25 mg  Hyperglycemia  Dextroscoliosispatient comes to [facility] for physical therapy  GERD  DRUG ABUSE IN PAST- follow [address]    Past Medical History:        Diagnosis Date    Chronic rhinitis     Cigarette smoker     COPD (chronic obstructive pulmonary disease) ([facility] [address] 2018    Depression     GERD (gastroesophageal reflux disease)     Hepatitis C, chronic (HCC)     Hypertension     Migraine     Multiple transfusions     Osteoarthritis     hands    Scoliosis     Substance abuse     ivda and cocaine abuse in past       Past Surgical History:        Procedure Laterality Date    APPENDECTOMY  2017     SECTION      GASTRIC BYPASS SURGERY      LAPAROSCOPIC APPENDECTOMY N/A 3/27/2017    APPENDECTOMY LAPAROSCOPIC performed by Ernie Dodd MD at [address] [place] [address]      hgsil    UPPER GASTROINTESTINAL ENDOSCOPY      oesophagitis, candidiasis       Allergies: Allergies   Allergen Reactions    Aspirin Itching    Keflet [Cephalexin]     Motrin [Ibuprofen]          Home Meds:   Prior to Admission medications    Medication Sig Start Date End Date Taking?  Authorizing Provider   predniSONE (DELTASONE) 50 MG tablet Take 1 tablet by mouth daily for 5 days 18  Joleen Paul,    tiotropium (SPIRIVA HANDIHALER) 18 MCG inhalation capsule Inhale 1 capsule into the lungs daily 8/6/18   Joleen Paul,    gabapentin (NEURONTIN) 300 MG capsule Take 300 mg by mouth 3 times daily. Farheen Davila Historical Provider, MD   Multiple Vitamins-Minerals (THERAPEUTIC MULTIVITAMIN-MINERALS) tablet Take 1 tablet by mouth daily    Historical Provider, MD   cyclobenzaprine (FLEXERIL) 5 MG tablet Take 5 mg by mouth 2 times daily as needed for Muscle spasms    Historical Provider, MD   acetaminophen (APAP EXTRA STRENGTH) 500 MG tablet Take 2 tablets by mouth every 8 hours as needed for Pain 8/4/17   Sunni Flores DO   albuterol sulfate HFA (PROVENTIL HFA) 108 (90 Base) MCG/ACT inhaler Inhale 1-2 puffs into the lungs every 4 hours as needed for Wheezing or Shortness of Breath (Space out to every 6 hours as symptoms improve) Space out to every 6 hours as symptoms improve. 6/14/17   Debbra Paget, MD   amLODIPine (NORVASC) 5 MG tablet Take 5 mg by mouth daily  1/17/17   Historical Provider, MD   SUBOXONE 8-2 MG FILM  1/19/17   Historical Provider, MD   lisinopril (PRINIVIL;ZESTRIL) 2.5 MG tablet Take 2.5 mg by mouth daily  1/17/17   Historical Provider, MD   metoprolol succinate (TOPROL XL) 25 MG extended release tablet Take 25 mg by mouth daily  1/17/17   Historical Provider, MD       Social History:   TOBACCO:   reports that she has been smoking Cigarettes. She has a 40.00 pack-year smoking history. She has never used smokeless tobacco.  ETOH:   reports that she does not drink alcohol. DRUGS:  reports that she does not use drugs. OCCUPATION:      Family History:   History reviewed. No pertinent family history. Physical Exam:    Vitals: BP (!) 155/126   Pulse 114   Temp 99 °F (37.2 °C) (Oral)   Resp 16   LMP 03/18/2015   SpO2 95%     Last Body weight:   Wt Readings from Last 3 Encounters:   08/04/18 117 lb 8 oz (53.3 kg)   08/04/18 116 lb (52.6 kg)   06/05/18 106 lb (48.1 kg)       Body Mass Index : There is no height or weight on file to calculate BMI. PHYSICAL EXAMINATION :    General appearance - alert, well appearing,Some distress  Mental status - alert, oriented to person, place, and time  Head- atraumatic, normocephalic  Eyes - pupils equal and reactive, extraocular eye movements intact, sclera anicteric  Ears - hearing grossly normal bilaterally  Mouth - mucous membranes moist, pharynx normal without lesions  Neck - supple, no significant adenopathy, carotids upstroke normal bilaterally, no bruits  Chest - positive for wheezing  Heart - normal rate, regular rhythm, normal S1, S2, no murmurs, rubs, clicks or gallops, no JVD, tachycardic  Abdomen - soft, +BS, nontender, + distended, no masses or organomegaly   Neurological - alert, oriented, normal speech, no focal findings or movement disorder noted, cranial nerves II-XII grossly intact  Extremities - peripheral pulses normal, no pedal edema, no clubbing or cyanosis,  Skin - normal coloration and turgor, no rashes, no suspicious skin lesions noted     Any additional physical findings: Positive for scoliosis    Laboratory findings:-    CBC:   Recent Labs      08/11/18   1516   WBC  11.4*   HGB  14.6   PLT  418     BMP:    Recent Labs      08/11/18   1516  08/11/18   1732   NA  136   --    K  5.5*   --    CL  100   --    CO2  18*   --    BUN  11   --    CREATININE  0.90  0.75   GLUCOSE  478*   --      S. Calcium:  Recent Labs      08/11/18   1516   CALCIUM  9.2     S. Ionized Calcium:No results for input(s): IONCA in the last 72 hours. S. Magnesium:No results for input(s): MG in the last 72 hours. S. Phosphorus:No results for input(s): PHOS in the last 72 hours. S. Glucose:  Recent Labs      08/11/18   1732   POCGLU  390*     Glycosylated hemoglobin A1C: No results for input(s): LABA1C in the last 72 hours. INR: No results for input(s): INR in the last 72 hours.   Hepatic functions:   Recent Labs      08/11/18   1516   ALKPHOS  56   ALT  18   AST  40*   PROT  8.2   BILITOT  0.34   LABALBU  4.0 PM

## 2018-08-11 NOTE — ED PROVIDER NOTES
[facility]  ED  Emergency Department Encounter  Emergency Medicine Resident     Pt Name: Freddy Sanches  MRN: [id number]  [place] 1955  Date of evaluation: 18  PCP:  Renetta Diaz    Chief Complaint     Chief Complaint   Patient presents with    Shortness of Breath     Recently diagnosed with COPD. HISTORY OF PRESENT ILLNESS     Emi Daniels is a 70 y.o. female who presented to the emergency department with concern for Respiratory distress for the last 3 days. Patient has an underlying history of COPD. The patient denies nausea, vomiting and diarrhea. The patient is working to breath and has shallow respirations. The patient reports using her albuterol prior to arrival with minimal response. REVIEW OF SYSTEMS       Constitutional: Denies recent fever, chills. Eyes: No visual changes. Neck: No midline neck pain but does complain of paraspinal muscle pain. Respiratory: + shortness of breath and dyspnea. Cardiac:  Denies recent chest pain. GI: denies any recent abdominal pain nausea or vomiting. Denies Blood in the stool or black tarry stools. Musculoskeletal: Denies focal weakness. Neurologic: denies headache or focal weakness. Skin:  Denies any rash. PAST MEDICAL/SURGICAL/FAMILY HISTORY     PMH:  Patient has no medical problems  has a past medical history of Chronic rhinitis; Cigarette smoker; COPD (chronic obstructive pulmonary disease) ([address] Depression; GERD (gastroesophageal reflux disease); Hepatitis C, chronic ([address] Hypertension; Migraine; Multiple transfusions; Osteoarthritis; Scoliosis; and Substance abuse. Surgical History: Patient has had no surgeries  has a past surgical history that includes  section; LEEP (); Upper gastrointestinal endoscopy (); Gastric bypass surgery; Appendectomy (2017); and laparoscopic appendectomy (N/A, 3/27/2017).   Social History:  Denies tobacco use; occasional alcohol use   Social History bandlike areas of atelectasis and/ or scarring in the lower lungs. Dependent secretions within the distal trachea and left mainstem bronchus. Upper Abdomen: Limited images of the upper abdomen are unremarkable. Soft Tissues/Bones: No acute bone or soft tissue abnormality. S-shaped curvature of the thoracic spine. Heterogeneous thyroid gland with right thyroid lobe calcifications. Negative for acute pulmonary embolism. Diffuse bronchial wall thickening suggests airway inflammation, such as that seen with asthma, bronchitis, smoking or aspiration. Consideration should be given to the latter given dependent secretions within the distal trachea and left mainstem bronchus as well as esophageal features suggesting dysmotility and reflux. Patchy ground-glass opacities in the upper lungs could also reflect aspiration sequela versus asymmetric edema, atelectasis or other infectious/ inflammatory process. Dada Digital Screen Self Referral W Or Wo Cad Bilateral    Result Date: 7/27/2018  EXAMINATION: BILATERAL DIGITAL SCREENING MAMMOGRAM, 7/27/2018 TECHNIQUE: CC and MLO views of the left and right breasts were obtained. Computer aided detection was utilized in the interpretation of this exam. COMPARISON: 23 September 2017; 14 September 2015 HISTORY: Screening. Positive family history of breast cancer ; patient's mother diagnosed with breast cancer at 59. Negative history of hormonal replacement therapy. No prior breast interventions. FINDINGS: Breast parenchyma is heterogeneously dense which somewhat lowers the sensitivity of the study and may obscure small masses. Area of asymmetry is present in the lower half of the left breast posterior 1/3 on MLO projection only with additional mammographic workup advised. Full workup may require ultrasonography. No skin thickening, nipple contour changes or malignant type microcalcifications are noted.      Asymmetry inferior aspect of the left breast posterior 1/3 seen on normal left ventricular size and normal wall motion. Left ventricular ejection fraction:  Greater than 70% TID score:  0.93  (Threshold value of 1.39 is used for Lexiscan stress with Tc-99m). There is no stress-induced cavitary dilatation to suggest compensated triple vessel disease. End diastolic volume:  16PW Scores are visually adjusted to account for potential artifact. Summed stress score:  0 Summed rest score:  0 Summed reversibility score:  0     1. No definitive scintigraphic evidence for reversible ischemia or infarct. 2. Left ventricular ejection fraction of greater than 70%. 3.  Please see report for EKG portion of the examination which will be performed separately by physician from cardiology. Risk stratification:  Low risk Note:  Risk stratification incorporates both clinical history and test results. Final risk determination is the responsibility of the ordering provider as history and other test results may increase or decrease the risk stratification reported for this examination. Risk stratification criteria are adapted from \"Noninvasive Risk Stratification\" criteria from [facility]. Al, ACC/AATS/AHA/ASE/ASNC/SCAI/SCCT/STS 2017 Appropriate Use Criteria For Coronary Revascularization in Patients With Stable Ischemic Heart Disease [facility] Volume 69, Issue 17, May 2017 High risk (>3% annual death or MI) 1. Severe resting LV dysfunction (LVEF >35%) not readily explained by non coronary causes 2. Resting perfusion abnormalities greater than 10% of the myocardium in patients without prior history or evidence of MI 3. Stress-induced perfusion abnormalities encumbering greater than or equal to 10% myocardium or stress segmental scores indicating multiple vascular territories with abnormalities 4. Stress-induced LV dilatation (TID ratio greater than 1.19 for exercise and greater than 1.39 for regadenoson) Intermediate risk (1% to 3% annual death or MI) 1.   Mild/moderate resting LV dysfunction (LVEF 35% to Disposition: Admitted    PATIENT REFERRED TO:  Renetta Diaz  [address] [place] [address] [id number] [phone]            DISCHARGE MEDICATIONS:  New Prescriptions    No medications on file         Jerald Kaba MD  Emergency Medicine Resident    (Please note that portions of this note were completed with a voice recognition program.  Efforts were made to edit the dictations but occasionally words are mis-transcribed.)           Tanis Phalen, MD  Resident  08/11/18 [id number]

## 2018-08-11 NOTE — CARE COORDINATION
Case Management Initial Discharge Plan  Emi Daniels,         Readmission Risk              Risk of Unplanned Readmission:        17               Met with:patient to discuss discharge plans. Information verified: address, contacts, phone number, , insurance Yes  PCP: Gregorio Chandler  Date of last visit: has an appointment schedule for next week,    Insurance Provider: [employer]    Discharge Planning    Living Arrangements:      Support Systems:       Home: independent  Patient able to perform ADL's:Independent    Current Services (outpatient & in home) none  DME equipment:   DME provider:     [facility]: [facility], medications are delivered to pt's home   Potential Assistance Purchasing Medications:     Does patient want to participate in local refill/ meds to beds program?       Potential Assistance Needed:       Patient agreeable to home care: No  Ubly of choice provided:  no    Prior SNF/Rehab Placement and Facility:   Agreeable to SNF/Rehab: No  Ubly of choice provided: n/a   Evaluation: no    Expected Discharge date:     Patient expects to be discharged to:      Follow Up Appointment: Best Day/ Time:      Transportation provider: medical cab  Transportation arrangements needed for discharge: Yes    Discharge Plan: return to home, declines skilled needs at present time        Electronically signed by Belén Willis RN on 18 at 4:57 PM

## 2018-08-11 NOTE — PROGRESS NOTES
SENIOR NOTE:  42-year-old female with past medical history of COPD, hepatitis C, depression, cigarette smoking, presented with chief complaint of shortness of breath worsening since morning. She continues to smoke 1 pack per day of cigarettes. She was recently seen in the ER 2 or 3 days back for the COPD exacerbation and was discharged on antibiotics and steroids which she stated that she was taking. But her shortness of breath continued to worsen. She also complained of cough with some phlegm does not know which color it was. She had some chills but denied any fevers or rigors. Patient also complained of some orthopnea and paroxysmal nocturnal dyspnea.     CT PE was ordered in the ER results pending    Potassium was elevated to 5.5    Her lactic acid in the ER was elevated to 8.8    She recently had stress test on 6 August which was negative    Exam:  Diffuse bilateral wheezing on examination  Tachycardic    Plan:  Lactic acidosisgiven fluid bolus in the ER, we will continue IV normal saline at 125 ML one of her, trend lactic acid  COPD exacerbationalbuterol when necessary DuoNeb and Dulera, IV methylprednisolone every 8 hours daily, levofloxacin 500 daily IV, smoking cessation, nicotine patch if needed  Hyperglycemia-medium dose insulin sliding scale, glucose checks before meals and at bedtime, hemoglobin A1c  History of substance abuseWill confirm dose of Suboxone with the pharmacy before administering

## 2018-08-11 NOTE — ED NOTES
Pt to ER with c/o SOB and productive cough, recently diagnosed with COPD this month. Denies chest pain. Audible crackles heard throughout. Placed on full cardiac monitor, pt tachycardic and tachypneic. Dr. Ciarra Martinez at bedside to evaluate the pt. Will continue to monitor.      Artie Rodriguez RN  08/11/18 [id number]

## 2018-08-11 NOTE — ED PROVIDER NOTES
[facility]  ED  eMERGENCY dEPARTMENT eNCOUnter   Attending Attestation     Pt Name: Carlos Enrique Garcia  MRN: [id number]  [place] 1955  Date of evaluation: 8/11/18       Emi Read is a 70 y.o. female who presents with Shortness of Breath (Recently diagnosed with COPD. )      History: Patient presents with shortness of breath. Patient also says that she can hear herself breathing. Patient says that she is short of breath on exertion. Patient recently admitted for similar symptoms. Patient has no other complaints at this time. Exam: Heart rate is elevated in the 120s. Lungs sound wet, Abdomen is soft, nontender. Patient with exertional dyspnea and elevated heart rate. Patient has sinus tachycardia. Patient recently admitted. Concern for PE versus COPD exacerbation versus pneumonia. Plan for admission for further evaluation and PE study. Patient recently had PE study but patient has had change in her clinical picture and I feel it is necessary to get another CT to rule out PE. I performed a history and physical examination of the patient and discussed management with the resident. I reviewed the residents note and agree with the documented findings and plan of care. Any areas of disagreement are noted on the chart. I was personally present for the key portions of any procedures. I have documented in the chart those procedures where I was not present during the key portions. I have personally reviewed all images and agree with the resident's interpretation. I have reviewed the emergency nurses triage note. I agree with the chief complaint, past medical history, past surgical history, allergies, medications, social and family history as documented unless otherwise noted below. Documentation of the HPI, Physical Exam and Medical Decision Making performed by medical students or scribes is based on my personal performance of the HPI, PE and MDM.  For Phys Assistant/ Nurse Practitioner cases/documentation I have had a face to face evaluation of this patient and have completed at least one if not all key elements of the E/M (history, physical exam, and MDM). Additional findings are as noted. For APC cases I have personally evaluated and examined the patient in conjunction with the APC and agree with the treatment plan and disposition of the patient as recorded by the APC.     Lopez Rodrigez MD  Attending Emergency  Physician       Jones Carter MD  08/11/18 Huber Julien

## 2018-08-11 NOTE — ED NOTES
Sw went to see patient to address patients questions regarding nutritional drinks. Patient reports that [facility] is already in process of assistance with script for patient. Patient denies any other needs @ this time.

## 2018-08-12 VITALS
DIASTOLIC BLOOD PRESSURE: 104 MMHG | SYSTOLIC BLOOD PRESSURE: 167 MMHG | BODY MASS INDEX: 25.92 KG/M2 | OXYGEN SATURATION: 96 % | HEIGHT: 59 IN | WEIGHT: 128.6 LBS | HEART RATE: 115 BPM | TEMPERATURE: 99.3 F | RESPIRATION RATE: 16 BRPM

## 2018-08-12 LAB
ABSOLUTE EOS #: 0 K/UL (ref 0–0.4)
ABSOLUTE IMMATURE GRANULOCYTE: 0.31 K/UL (ref 0–0.3)
ABSOLUTE LYMPH #: 1.72 K/UL (ref 1–4.8)
ABSOLUTE MONO #: 0.78 K/UL (ref 0.1–0.8)
ANION GAP SERPL CALCULATED.3IONS-SCNC: 17 MMOL/L (ref 9–17)
BASOPHILS # BLD: 0 % (ref 0–2)
BASOPHILS ABSOLUTE: 0 K/UL (ref 0–0.2)
BUN BLDV-MCNC: 10 MG/DL (ref 8–23)
BUN/CREAT BLD: ABNORMAL (ref 9–20)
CALCIUM SERPL-MCNC: 8.8 MG/DL (ref 8.6–10.4)
CHLORIDE BLD-SCNC: 105 MMOL/L (ref 98–107)
CO2: 21 MMOL/L (ref 20–31)
CREAT SERPL-MCNC: 0.6 MG/DL (ref 0.5–0.9)
DIFFERENTIAL TYPE: ABNORMAL
EOSINOPHILS RELATIVE PERCENT: 0 % (ref 1–4)
ESTIMATED AVERAGE GLUCOSE: 111 MG/DL
GFR AFRICAN AMERICAN: >60 ML/MIN
GFR NON-AFRICAN AMERICAN: >60 ML/MIN
GFR SERPL CREATININE-BSD FRML MDRD: ABNORMAL ML/MIN/{1.73_M2}
GFR SERPL CREATININE-BSD FRML MDRD: ABNORMAL ML/MIN/{1.73_M2}
GLUCOSE BLD-MCNC: 164 MG/DL (ref 65–105)
GLUCOSE BLD-MCNC: 214 MG/DL (ref 70–99)
HBA1C MFR BLD: 5.5 % (ref 4–6)
HCT VFR BLD CALC: 46.9 % (ref 36.3–47.1)
HEMOGLOBIN: 14.4 G/DL (ref 11.9–15.1)
IMMATURE GRANULOCYTES: 2 %
LACTIC ACID, WHOLE BLOOD: 4.3 MMOL/L (ref 0.7–2.1)
LACTIC ACID: ABNORMAL MMOL/L
LYMPHOCYTES # BLD: 11 % (ref 24–44)
MCH RBC QN AUTO: 32 PG (ref 25.2–33.5)
MCHC RBC AUTO-ENTMCNC: 30.7 G/DL (ref 28.4–34.8)
MCV RBC AUTO: 104.2 FL (ref 82.6–102.9)
MONOCYTES # BLD: 5 % (ref 1–7)
MORPHOLOGY: ABNORMAL
NRBC AUTOMATED: 0.1 PER 100 WBC
PDW BLD-RTO: 14.6 % (ref 11.8–14.4)
PLATELET # BLD: 417 K/UL (ref 138–453)
PLATELET ESTIMATE: ABNORMAL
PMV BLD AUTO: 9.3 FL (ref 8.1–13.5)
POTASSIUM SERPL-SCNC: 3.8 MMOL/L (ref 3.7–5.3)
RBC # BLD: 4.5 M/UL (ref 3.95–5.11)
RBC # BLD: ABNORMAL 10*6/UL
SEG NEUTROPHILS: 82 % (ref 36–66)
SEGMENTED NEUTROPHILS ABSOLUTE COUNT: 12.79 K/UL (ref 1.8–7.7)
SODIUM BLD-SCNC: 143 MMOL/L (ref 135–144)
TROPONIN INTERP: NORMAL
TROPONIN T: <0.03 NG/ML
WBC # BLD: 15.6 K/UL (ref 3.5–11.3)
WBC # BLD: ABNORMAL 10*3/UL

## 2018-08-12 PROCEDURE — 83605 ASSAY OF LACTIC ACID: CPT

## 2018-08-12 PROCEDURE — 36415 COLL VENOUS BLD VENIPUNCTURE: CPT

## 2018-08-12 PROCEDURE — 6370000000 HC RX 637 (ALT 250 FOR IP): Performed by: STUDENT IN AN ORGANIZED HEALTH CARE EDUCATION/TRAINING PROGRAM

## 2018-08-12 PROCEDURE — 85025 COMPLETE CBC W/AUTO DIFF WBC: CPT

## 2018-08-12 PROCEDURE — 94640 AIRWAY INHALATION TREATMENT: CPT

## 2018-08-12 PROCEDURE — 84484 ASSAY OF TROPONIN QUANT: CPT

## 2018-08-12 PROCEDURE — 99223 1ST HOSP IP/OBS HIGH 75: CPT | Performed by: INTERNAL MEDICINE

## 2018-08-12 PROCEDURE — 82947 ASSAY GLUCOSE BLOOD QUANT: CPT

## 2018-08-12 PROCEDURE — 6360000002 HC RX W HCPCS: Performed by: STUDENT IN AN ORGANIZED HEALTH CARE EDUCATION/TRAINING PROGRAM

## 2018-08-12 PROCEDURE — 80048 BASIC METABOLIC PNL TOTAL CA: CPT

## 2018-08-12 PROCEDURE — 2580000003 HC RX 258: Performed by: STUDENT IN AN ORGANIZED HEALTH CARE EDUCATION/TRAINING PROGRAM

## 2018-08-12 PROCEDURE — 6370000000 HC RX 637 (ALT 250 FOR IP): Performed by: HOSPITALIST

## 2018-08-12 RX ORDER — ACETAMINOPHEN 500 MG
1000 TABLET ORAL EVERY 8 HOURS PRN
Qty: 60 TABLET | Refills: 0 | Status: ON HOLD | OUTPATIENT
Start: 2018-08-12 | End: 2019-06-19 | Stop reason: ALTCHOICE

## 2018-08-12 RX ORDER — LISINOPRIL 10 MG/1
10 TABLET ORAL DAILY
Status: DISCONTINUED | OUTPATIENT
Start: 2018-08-12 | End: 2018-08-12

## 2018-08-12 RX ORDER — ALBUTEROL SULFATE 90 UG/1
1-2 AEROSOL, METERED RESPIRATORY (INHALATION) EVERY 4 HOURS PRN
Qty: 1 INHALER | Refills: 0 | Status: SHIPPED | OUTPATIENT
Start: 2018-08-12 | End: 2018-08-31 | Stop reason: SDUPTHER

## 2018-08-12 RX ORDER — LISINOPRIL 10 MG/1
10 TABLET ORAL DAILY
Qty: 30 TABLET | Refills: 3 | Status: ON HOLD | OUTPATIENT
Start: 2018-08-12 | End: 2018-11-13 | Stop reason: HOSPADM

## 2018-08-12 RX ORDER — LISINOPRIL 10 MG/1
10 TABLET ORAL DAILY
Status: DISCONTINUED | OUTPATIENT
Start: 2018-08-12 | End: 2018-08-12 | Stop reason: HOSPADM

## 2018-08-12 RX ORDER — LEVOFLOXACIN 500 MG/1
500 TABLET, FILM COATED ORAL DAILY
Qty: 5 TABLET | Refills: 0 | Status: SHIPPED | OUTPATIENT
Start: 2018-08-12 | End: 2018-08-17

## 2018-08-12 RX ORDER — IPRATROPIUM BROMIDE AND ALBUTEROL SULFATE 2.5; .5 MG/3ML; MG/3ML
3 SOLUTION RESPIRATORY (INHALATION) EVERY 4 HOURS PRN
Qty: 360 ML | Refills: 2 | Status: ON HOLD | OUTPATIENT
Start: 2018-08-12 | End: 2018-10-25 | Stop reason: HOSPADM

## 2018-08-12 RX ORDER — LISINOPRIL 10 MG/1
10 TABLET ORAL DAILY
Qty: 30 TABLET | Refills: 3 | Status: SHIPPED | OUTPATIENT
Start: 2018-08-12 | End: 2018-08-12

## 2018-08-12 RX ORDER — LEVOFLOXACIN 5 MG/ML
500 INJECTION, SOLUTION INTRAVENOUS EVERY 24 HOURS
Status: DISCONTINUED | OUTPATIENT
Start: 2018-08-12 | End: 2018-08-12 | Stop reason: HOSPADM

## 2018-08-12 RX ORDER — LEVOFLOXACIN 5 MG/ML
750 INJECTION, SOLUTION INTRAVENOUS EVERY 24 HOURS
Status: DISCONTINUED | OUTPATIENT
Start: 2018-08-12 | End: 2018-08-12

## 2018-08-12 RX ORDER — AMLODIPINE BESYLATE 5 MG/1
5 TABLET ORAL DAILY
Qty: 30 TABLET | Refills: 3 | Status: ON HOLD | OUTPATIENT
Start: 2018-08-12 | End: 2018-09-07 | Stop reason: HOSPADM

## 2018-08-12 RX ORDER — LABETALOL HYDROCHLORIDE 5 MG/ML
20 INJECTION, SOLUTION INTRAVENOUS EVERY 4 HOURS PRN
Status: DISCONTINUED | OUTPATIENT
Start: 2018-08-12 | End: 2018-08-12 | Stop reason: HOSPADM

## 2018-08-12 RX ORDER — LEVOFLOXACIN 500 MG/1
500 TABLET, FILM COATED ORAL DAILY
Qty: 5 TABLET | Refills: 0 | Status: SHIPPED | OUTPATIENT
Start: 2018-08-12 | End: 2018-08-12

## 2018-08-12 RX ORDER — PREDNISONE 20 MG/1
40 TABLET ORAL DAILY
Qty: 8 TABLET | Refills: 0 | Status: SHIPPED | OUTPATIENT
Start: 2018-08-12 | End: 2018-08-16

## 2018-08-12 RX ORDER — PREDNISONE 20 MG/1
40 TABLET ORAL DAILY
Qty: 5 TABLET | Refills: 0 | Status: SHIPPED | OUTPATIENT
Start: 2018-08-12 | End: 2018-08-12

## 2018-08-12 RX ORDER — IPRATROPIUM BROMIDE AND ALBUTEROL SULFATE 2.5; .5 MG/3ML; MG/3ML
3 SOLUTION RESPIRATORY (INHALATION)
Qty: 360 ML | Refills: 2 | Status: SHIPPED | OUTPATIENT
Start: 2018-08-12 | End: 2018-08-12

## 2018-08-12 RX ORDER — M-VIT,TX,IRON,MINS/CALC/FOLIC 27MG-0.4MG
1 TABLET ORAL DAILY
Qty: 30 TABLET | Refills: 3 | Status: SHIPPED | OUTPATIENT
Start: 2018-08-12

## 2018-08-12 RX ORDER — METOPROLOL SUCCINATE 25 MG/1
25 TABLET, EXTENDED RELEASE ORAL DAILY
Qty: 30 TABLET | Refills: 3 | Status: ON HOLD | OUTPATIENT
Start: 2018-08-12 | End: 2018-09-13 | Stop reason: HOSPADM

## 2018-08-12 RX ORDER — 0.9 % SODIUM CHLORIDE 0.9 %
500 INTRAVENOUS SOLUTION INTRAVENOUS ONCE
Status: COMPLETED | OUTPATIENT
Start: 2018-08-12 | End: 2018-08-12

## 2018-08-12 RX ORDER — GABAPENTIN 300 MG/1
300 CAPSULE ORAL 3 TIMES DAILY
Qty: 90 CAPSULE | Refills: 3 | Status: ON HOLD | OUTPATIENT
Start: 2018-08-12 | End: 2018-09-13 | Stop reason: HOSPADM

## 2018-08-12 RX ADMIN — LISINOPRIL 10 MG: 10 TABLET ORAL at 12:58

## 2018-08-12 RX ADMIN — MULTIPLE VITAMINS W/ MINERALS TAB 1 TABLET: TAB at 07:50

## 2018-08-12 RX ADMIN — METHYLPREDNISOLONE SODIUM SUCCINATE 40 MG: 40 INJECTION, POWDER, FOR SOLUTION INTRAMUSCULAR; INTRAVENOUS at 03:33

## 2018-08-12 RX ADMIN — ACETAMINOPHEN 650 MG: 325 TABLET ORAL at 09:40

## 2018-08-12 RX ADMIN — METHYLPREDNISOLONE SODIUM SUCCINATE 40 MG: 40 INJECTION, POWDER, FOR SOLUTION INTRAMUSCULAR; INTRAVENOUS at 12:30

## 2018-08-12 RX ADMIN — GABAPENTIN 300 MG: 300 CAPSULE ORAL at 13:28

## 2018-08-12 RX ADMIN — GABAPENTIN 300 MG: 300 CAPSULE ORAL at 07:50

## 2018-08-12 RX ADMIN — INSULIN LISPRO 2 UNITS: 100 INJECTION, SOLUTION INTRAVENOUS; SUBCUTANEOUS at 07:49

## 2018-08-12 RX ADMIN — FAMOTIDINE 20 MG: 20 TABLET, FILM COATED ORAL at 07:50

## 2018-08-12 RX ADMIN — MOMETASONE FUROATE AND FORMOTEROL FUMARATE DIHYDRATE 2 PUFF: 200; 5 AEROSOL RESPIRATORY (INHALATION) at 09:08

## 2018-08-12 RX ADMIN — ALBUTEROL SULFATE 1 PUFF: 90 AEROSOL, METERED RESPIRATORY (INHALATION) at 06:10

## 2018-08-12 RX ADMIN — IPRATROPIUM BROMIDE AND ALBUTEROL SULFATE 1 AMPULE: .5; 3 SOLUTION RESPIRATORY (INHALATION) at 09:08

## 2018-08-12 RX ADMIN — IPRATROPIUM BROMIDE AND ALBUTEROL SULFATE 1 AMPULE: .5; 3 SOLUTION RESPIRATORY (INHALATION) at 12:01

## 2018-08-12 RX ADMIN — SODIUM CHLORIDE 500 ML: 9 INJECTION, SOLUTION INTRAVENOUS at 06:48

## 2018-08-12 ASSESSMENT — PAIN SCALES - GENERAL
PAINLEVEL_OUTOF10: 7
PAINLEVEL_OUTOF10: 5

## 2018-08-12 ASSESSMENT — ENCOUNTER SYMPTOMS
SHORTNESS OF BREATH: 1
NAUSEA: 0
COUGH: 1
VOMITING: 0
GASTROINTESTINAL NEGATIVE: 1
ORTHOPNEA: 1
SPUTUM PRODUCTION: 1
HEMOPTYSIS: 0
WHEEZING: 1

## 2018-08-12 NOTE — PROGRESS NOTES
Physical Therapy  DATE: 2018    NAME: Cris Vuong  MRN: [id number]   : 1955    Patient not seen this date for Physical Therapy due to:  [] Blood transfusion in progress  [] Hemodialysis  []  Patient Declined  [] Spine Precautions   [] Strict Bedrest  [] Surgery/ Procedure  [] Testing      [] Other        [x] PT being discontinued at this time. Patient independent. No further needs. Per RN pt is independent, will defer evaluation. [] PT being discontinued at this time as the patient has been transferred to palliative care. No further needs.     Mabel Maciel, PT

## 2018-08-12 NOTE — PROGRESS NOTES
PATIENT REFUSES TO WEAR BIPAP     [x] Risks and benefits explained to patient   [x] Patient refuses to wear Bipap stating \"not right now. \" but is willing to try it if she feels the need throughout the night. [x] Patient verbalizes understanding of information presented. RN informed and aware.

## 2018-08-12 NOTE — PROGRESS NOTES
CLINICAL PHARMACY NOTE: MEDS TO [address] [place] [address] Drive Select Patient?: Yes  Total # of Prescriptions Filled: 6   The following medications were delivered to the patient:  · LISINPRIL  · PREDNISONE  · IPRATROPIUM  · LEVOQUIN  · VENTOLIN  · TYLENOL  Total # of Interventions Completed: 0  Time Spent (min): 45    Additional Documentation: HAD TO PERFECT SERVE DOCTOR TO GET A MEDICATION QUANTITY CHANGED

## 2018-08-12 NOTE — PROGRESS NOTES
[address] [place] [address]     Department of Internal Medicine - Staff Internal Medicine Service     DAILY PROGRESS NOTE       Patient:  bAbe Chacko  YOB: 1955  MRN: [id number]     Acct: [de-identified]     Admit date: 8/11/2018  Admitting Diagnosis: COPD with acute exacerbation ([address]    Subjective:   Patient seen and examined at bedside  No acute events overnight  Breathing, sob improved  Wheezing improvd    Review of Systems   Constitutional: Negative for chills and fever. HENT: Negative. Respiratory: Positive for cough, sputum production and shortness of breath. Cardiovascular: Positive for palpitations, leg swelling and PND. Gastrointestinal: Negative. Genitourinary: Negative.              Objective:   BP (!) 178/89   Pulse 109   Temp 99.3 °F (37.4 °C)   Resp 18   Ht 4' 11\" (1.499 m)   Wt 128 lb 9.6 oz (58.3 kg)   LMP 03/18/2015   SpO2 95%   BMI 25.97 kg/m²       General appearance - alert, well appearing,Some distress  Mental status - alert, oriented to person, place, and time  Head- atraumatic, normocephalic  Eyes - pupils equal and reactive, extraocular eye movements intact, sclera anicteric  Ears - hearing grossly normal bilaterally  Mouth - mucous membranes moist, pharynx normal without lesions  Neck - supple, no significant adenopathy, carotids upstroke normal bilaterally, no bruits  Chest - positive for wheezing  Heart - normal rate, regular rhythm, normal S1, S2, no murmurs, rubs, clicks or gallops, no JVD, tachycardic  Abdomen - soft, +BS, nontender, + distended, no masses or organomegaly   Neurological - alert, oriented, normal speech, no focal findings or movement disorder noted, cranial nerves II-XII grossly intact  Extremities - peripheral pulses normal, + pedal edema, no clubbing or cyanosis,  Skin - normal coloration and turgor, no rashes, no suspicious skin lesions noted        Intake/Output Summary (Last 24 hours) at 08/12/18 6396  Last data filed at Main pulmonary artery is normal in caliber. Mediastinum: No evidence of mediastinal lymphadenopathy. The heart and pericardium demonstrate no acute abnormality. There is no acute abnormality of the thoracic aorta. Segmental gaseous distention of the esophagus with scattered air-fluid levels. Lungs/pleura: No pneumothorax or pleural effusion. Moderate diffuse bronchial wall thickening. Patchy ground-glass opacities in the upper lungs. Linear and bandlike areas of atelectasis and/ or scarring in the lower lungs. Dependent secretions within the distal trachea and left mainstem bronchus. Upper Abdomen: Limited images of the upper abdomen are unremarkable. Soft Tissues/Bones: No acute bone or soft tissue abnormality. S-shaped curvature of the thoracic spine. Heterogeneous thyroid gland with right thyroid lobe calcifications. Negative for acute pulmonary embolism. Diffuse bronchial wall thickening suggests airway inflammation, such as that seen with asthma, bronchitis, smoking or aspiration. Consideration should be given to the latter given dependent secretions within the distal trachea and left mainstem bronchus as well as esophageal features suggesting dysmotility and reflux. Patchy ground-glass opacities in the upper lungs could also reflect aspiration sequela versus asymmetric edema, atelectasis or other infectious/ inflammatory process. [facility] Digital Screen Self Referral W Or Wo Cad Bilateral    Result Date: 7/27/2018  EXAMINATION: BILATERAL DIGITAL SCREENING MAMMOGRAM, 7/27/2018 TECHNIQUE: CC and MLO views of the left and right breasts were obtained. Computer aided detection was utilized in the interpretation of this exam. COMPARISON: 23 September 2017; 14 September 2015 HISTORY: Screening. Positive family history of breast cancer ; patient's mother diagnosed with breast cancer at 59. Negative history of hormonal replacement therapy. No prior breast interventions.  FINDINGS: Breast parenchyma is lisinopril to 10mg     Leukocytosis  Wbc 15.6  Neutrophilia 81%,      Hyperglycemia  478 glucose at presentation  Received 10 units of Regular Insulin  Medium sliding scale insulin   Glucose checks AC,  poc glucose  214 am  hBA1C OF 5.4     Smoking cessation  NICODERM- patch     Neurontin 300 mg 3 times a day     History of hep C -- chronic    Follow up on ECHO  Stress test negative 1 wk back     Lovenox 40 mg for DVT prophylaxis     Pepcid 20 mg twice a day for GI prophylaxis     History of substance abuse, taking Suboxone, will confirm from pharmacy before continuing     PT/OT/SW    DR Sheryle Isles, MD  [address], PGY-1  [address] [place], [place]  8/12/2018,10:39 AM

## 2018-08-13 ENCOUNTER — HOSPITAL ENCOUNTER (OUTPATIENT)
Dept: ULTRASOUND IMAGING | Age: 63
Discharge: HOME | End: 2018-08-15
Payer: MEDICARE

## 2018-08-13 ENCOUNTER — HOSPITAL ENCOUNTER (OUTPATIENT)
Dept: MAMMOGRAPHY | Age: 63
Discharge: HOME | End: 2018-08-15
Payer: MEDICARE

## 2018-08-13 DIAGNOSIS — R92.8 ABNORMAL MAMMOGRAPHY: ICD-10-CM

## 2018-08-13 DIAGNOSIS — R92.8 ABNORMAL MAMMOGRAM: ICD-10-CM

## 2018-08-13 LAB
EKG ATRIAL RATE: 126 BPM
EKG P AXIS: 69 DEGREES
EKG P-R INTERVAL: 126 MS
EKG Q-T INTERVAL: 322 MS
EKG QRS DURATION: 72 MS
EKG QTC CALCULATION (BAZETT): 466 MS
EKG R AXIS: 47 DEGREES
EKG T AXIS: 28 DEGREES
EKG VENTRICULAR RATE: 126 BPM

## 2018-08-13 PROCEDURE — 76642 ULTRASOUND BREAST LIMITED: CPT

## 2018-08-13 PROCEDURE — G0279 TOMOSYNTHESIS, MAMMO: HCPCS

## 2018-08-15 ENCOUNTER — HOSPITAL ENCOUNTER (OUTPATIENT)
Dept: GENERAL RADIOLOGY | Age: 63
Discharge: HOME | End: 2018-08-17
Payer: MEDICARE

## 2018-08-15 ENCOUNTER — HOSPITAL ENCOUNTER (OUTPATIENT)
Age: 63
Discharge: HOME | End: 2018-08-17
Payer: MEDICARE

## 2018-08-15 DIAGNOSIS — K59.00 CONSTIPATION, UNSPECIFIED CONSTIPATION TYPE: ICD-10-CM

## 2018-08-15 PROCEDURE — 74018 RADEX ABDOMEN 1 VIEW: CPT

## 2018-08-15 NOTE — DISCHARGE SUMMARY
[address] [place] [address]     Department of Internal Medicine - Staff Internal Medicine Service    INPATIENT DISCHARGE SUMMARY        Patient Identification:  Sowmya Dubois is a 70 y.o. female. :  1955  MRN: [id number]     Acct: [de-identified]   Admit Date:  2018  Discharge date and time: 2018  4:12 PM   Attending Provider: No att. providers found                                     Admission Diagnoses:   COPD with acute exacerbation ([facility] [J44.1]    Discharge Diagnoses:   Principal Problem:    COPD with acute exacerbation ([facility]  Active Problems:    Cigarette smoker    Lactic acidosis    Hyperglycemia    Essential hypertension    COPD exacerbation ([facility]  Resolved Problems:    * No resolved hospital problems. *       Consults:   pulmonary/intensive care    Brief Inpatient course:Emi Dubois is a 70 y.o. Who came to the ED with history of shortness of breath since yesterday and wheezing. Patient states that she was short of breath since yesterday, had dyspnea on exertion and also experienced shortness of breath on speaking. Patient says that she could hear her own breathing. She also experienced cough since morning, and brings up little amount of clear sputum. Patient was started on breathing treatments with DuoNeb and dulera. Levaquin and steroid started. Lactic acidosis resolved. Wheezing got better.   Pt improved and discharged home the next day    Procedures:  none    Any Hospital Acquired Infections: none    Discharge Functional Status:  stable    Disposition: home    Patient Instructions:   Discharge Medication List as of 2018  1:58 PM      CONTINUE these medications which have CHANGED    Details   acetaminophen (APAP EXTRA STRENGTH) 500 MG tablet Take 2 tablets by mouth every 8 hours as needed for Pain, Disp-60 tablet, R-0Normal      albuterol sulfate HFA (PROVENTIL HFA) 108 (90 Base) MCG/ACT inhaler Inhale 1-2 puffs into the lungs every 4 hours as needed for Wheezing or Shortness of Breath (Space out to every 6 hours as symptoms improve) Space out to every 6 hours as symptoms improve., Disp-1 Inhaler, R-0Print      ipratropium-albuterol (DUONEB) 0.5-2.5 (3) MG/3ML SOLN nebulizer solution Inhale 3 mLs into the lungs every 4 hours as needed for Shortness of Breath, Disp-360 mL, R-2Normal      gabapentin (NEURONTIN) 300 MG capsule Take 1 capsule by mouth 3 times daily for 120 days. ., Disp-90 capsule, R-3Normal      lisinopril (PRINIVIL;ZESTRIL) 10 MG tablet Take 1 tablet by mouth daily, Disp-30 tablet, R-3Normal      metoprolol succinate (TOPROL XL) 25 MG extended release tablet Take 1 tablet by mouth daily, Disp-30 tablet, R-3Normal      amLODIPine (NORVASC) 5 MG tablet Take 1 tablet by mouth daily, Disp-30 tablet, R-3Normal      levofloxacin (LEVAQUIN) 500 MG tablet Take 1 tablet by mouth daily for 5 days, Disp-5 tablet, R-0Normal      Multiple Vitamins-Minerals (THERAPEUTIC MULTIVITAMIN-MINERALS) tablet Take 1 tablet by mouth daily, Disp-30 tablet, R-3Normal      predniSONE (DELTASONE) 20 MG tablet Take 2 tablets by mouth daily for 4 days, Disp-5 tablet, R-0Normal         CONTINUE these medications which have NOT CHANGED    Details   cyclobenzaprine (FLEXERIL) 5 MG tablet Take 5 mg by mouth 2 times daily as needed for Muscle spasmsHistorical Med      SUBOXONE 8-2 MG FILM ELIO         STOP taking these medications       tiotropium (SPIRIVA HANDIHALER) 18 MCG inhalation capsule Comments:   Reason for Stopping:               Activity: activity as tolerated    Diet: regular diet    Follow-up:    Kia Castro  [address] [place] [phone]    Schedule an appointment as soon as possible for a visit in 1 week        Follow up labs: none    Follow up imaging: none    Note that over 30 minutes was spent in preparing discharge papers, discussing discharge with patient, medication review, etc.      Keyon Ruiz MD         [facility]. [address]

## 2018-08-17 LAB
CULTURE: NORMAL
CULTURE: NORMAL
Lab: NORMAL
Lab: NORMAL
SPECIMEN DESCRIPTION: NORMAL
SPECIMEN DESCRIPTION: NORMAL
STATUS: NORMAL
STATUS: NORMAL

## 2018-08-31 ENCOUNTER — OFFICE VISIT (OUTPATIENT)
Dept: PULMONOLOGY | Age: 63
End: 2018-08-31
Payer: MEDICARE

## 2018-08-31 VITALS
HEART RATE: 113 BPM | DIASTOLIC BLOOD PRESSURE: 80 MMHG | WEIGHT: 130 LBS | BODY MASS INDEX: 26.21 KG/M2 | SYSTOLIC BLOOD PRESSURE: 130 MMHG | HEIGHT: 59 IN | OXYGEN SATURATION: 90 %

## 2018-08-31 DIAGNOSIS — F19.10 SUBSTANCE ABUSE (HCC): ICD-10-CM

## 2018-08-31 DIAGNOSIS — K21.9 GASTROESOPHAGEAL REFLUX DISEASE WITHOUT ESOPHAGITIS: ICD-10-CM

## 2018-08-31 DIAGNOSIS — J44.9 COPD, MODERATE (HCC): Primary | ICD-10-CM

## 2018-08-31 DIAGNOSIS — Z87.891 PERSONAL HISTORY OF TOBACCO USE: ICD-10-CM

## 2018-08-31 PROCEDURE — G8926 SPIRO NO PERF OR DOC: HCPCS | Performed by: NURSE PRACTITIONER

## 2018-08-31 PROCEDURE — 1101F PT FALLS ASSESS-DOCD LE1/YR: CPT | Performed by: NURSE PRACTITIONER

## 2018-08-31 PROCEDURE — 1123F ACP DISCUSS/DSCN MKR DOCD: CPT | Performed by: NURSE PRACTITIONER

## 2018-08-31 PROCEDURE — 1111F DSCHRG MED/CURRENT MED MERGE: CPT | Performed by: NURSE PRACTITIONER

## 2018-08-31 PROCEDURE — 1090F PRES/ABSN URINE INCON ASSESS: CPT | Performed by: NURSE PRACTITIONER

## 2018-08-31 PROCEDURE — 4040F PNEUMOC VAC/ADMIN/RCVD: CPT | Performed by: NURSE PRACTITIONER

## 2018-08-31 PROCEDURE — G8419 CALC BMI OUT NRM PARAM NOF/U: HCPCS | Performed by: NURSE PRACTITIONER

## 2018-08-31 PROCEDURE — 99214 OFFICE O/P EST MOD 30 MIN: CPT | Performed by: NURSE PRACTITIONER

## 2018-08-31 PROCEDURE — 4004F PT TOBACCO SCREEN RCVD TLK: CPT | Performed by: NURSE PRACTITIONER

## 2018-08-31 PROCEDURE — 3023F SPIROM DOC REV: CPT | Performed by: NURSE PRACTITIONER

## 2018-08-31 PROCEDURE — G8427 DOCREV CUR MEDS BY ELIG CLIN: HCPCS | Performed by: NURSE PRACTITIONER

## 2018-08-31 PROCEDURE — 3017F COLORECTAL CA SCREEN DOC REV: CPT | Performed by: NURSE PRACTITIONER

## 2018-08-31 PROCEDURE — G8400 PT W/DXA NO RESULTS DOC: HCPCS | Performed by: NURSE PRACTITIONER

## 2018-08-31 RX ORDER — ALBUTEROL SULFATE 90 UG/1
1-2 AEROSOL, METERED RESPIRATORY (INHALATION) EVERY 4 HOURS PRN
Qty: 1 INHALER | Refills: 5 | Status: SHIPPED | OUTPATIENT
Start: 2018-08-31 | End: 2018-12-05 | Stop reason: SDUPTHER

## 2018-08-31 NOTE — PATIENT INSTRUCTIONS
nicotine. · Be prepared to keep trying. Most people are not successful the first few times they try to quit. Do not get mad at yourself if you smoke again. Make a list of things you learned and think about when you want to try again, such as next week, next month, or next year. Where can you learn more? Go to [email or web address] elena.A vida Ã© feita de [employer]. org and sign in to your [employer] account. Enter Y416 in the [employer] box to learn more about \"Stopping Smoking: Care Instructions. \"     If you do not have an account, please click on the \"Sign Up Now\" link. Current as of: November 29, 2017  Content Version: 11.7  © 0409-0358 [employer]. Care instructions adapted under license by [facility] ([facility]). If you have questions about a medical condition or this instruction, always ask your healthcare professional. [address] any warranty or liability for your use of this information.

## 2018-08-31 NOTE — PROGRESS NOTES
PULMONARY OP  PROGRESS NOTE      Patient:  Michael Cabrera  YOB: 1955    MRN: [id number]     Acct:        Pt seen and Chart reviewed. Mrs. Nicole Rodriguez is here in followup for    Diagnosis Orders   1. COPD, moderate (HCC)  albuterol sulfate HFA (PROVENTIL HFA) 108 (90 Base) MCG/ACT inhaler   2. Personal history of tobacco use     3. Gastroesophageal reflux disease without esophagitis     4. Substance abuse         Pt was recently hospitalized at [place] [address] for acute exacerbation of COPD, seen by our group and treated with steroids and BD. She had no prior history of COPD and did not follow with a PCP. Medical history is significant for tobacco use, 1 ppd x30 years and heroin abuse currently on suboxone therapy. PFTs were performed in the hospital which revealed restrictive ventilatory dysfunction of moderate severity with air trapping and diffusion capacity severely reduced; FEV1 53% predicted with no bronchodilator change. She was discharged home on albuterol q6h prn SOB/wheezing and duonebs QID prn SOB/Wheezing. It was recommended per Dr. Gigi Van she start LAMA; however patient refused on discharge. On exam she denies worsening SOB, TSAI; she denies wheezing, cough, sputum production, chest tightness, fever/chills. She states she uses her albuterol rescue inhaler in the morning and then again in the evening and does duonebs \"at least 3 times a day. \" Education was provided at length regarding severity of her disease and recommended treatment options, including long acting BD (LABA/LAMA). Pt continues to refuse further treatment stating, \"what I am doing now works fine. \" She continues to smoke 1 ppd and is not motivated to quit. Subjective:   Review of Systems -   Constitutional ROS: negative  ENT ROS: negative  Allergy and Immunology ROS: negative  Respiratory ROS: negative  Cardiovascular ROS: negative  Gastrointestinal ROS: negative  Musculoskeletal ROS: negative    Allergies:   Allergies supple with midline trachea. Neck: Supple, symmetrical, trachea midline, no adenopathy, no goiter, no jvd skin normal  Respiratory: clear to auscultation, no wheezes or rales and unlabored breathing. No intercostal tenderness  Cardiovascular: regular rate and rhythm, normal S1, S2, no murmur noted  Abdomen: soft, nontender, nondistended, no masses or organomegaly  Extremities:  no pedal edema, no clubbing or cyanosis    Pulmonary Functions Testing Results:    DATE OF PROCEDURE:  08/06/2018The patient's flow volume loop is  restrictive. FEV1 at 53% predicted, FVC 55% predicted. No bronchodilator  change. FEV1/FVC ratio is 76. XNQ45-66 is 40% predicted. Lung volumes  show % predicted, TLC 94% predicted. Diffusion capacity is decreased  at 49% predicted.     FINAL IMPRESSION:  The study shows a restrictive ventilatory dysfunction of  moderate severity , air trapping noted, and diffusion capacity is  severely reduced. This raises the concern of an underlying obstructive  process in addition to a restrictive lung disease. Clinical correlation is  advised.             Assessment:     Diagnosis Orders   1. COPD, moderate (HCC)  albuterol sulfate HFA (PROVENTIL HFA) 108 (90 Base) MCG/ACT inhaler   2. Personal history of tobacco use     3. Gastroesophageal reflux disease without esophagitis     4. Substance abuse           PLAN:    Chart/Labs/Imaging from hospitalization reviewed. Recommend LABA or LAMA - pt refusing at this time  Continue BD - albuterol rescue inhaler q6h prn SOB/Wheezing; duonebs QID prn SOB/Wheezing   Smoking cessation strongly encouraged - pt unmotivated to quit at this time. Refills were provided - albuterol   Educated and clarified the medication use. Recommend flu vaccination in the fall annually. Recommendations given regarding pneumococcal vaccinations. Patient is up-to-date with vaccinations from pulmonary perspective. Maintain an active lifestyle.   Questions patient had were

## 2018-09-01 ENCOUNTER — HOSPITAL ENCOUNTER (EMERGENCY)
Age: 63
Discharge: HOME | End: 2018-09-01
Attending: EMERGENCY MEDICINE
Payer: MEDICARE

## 2018-09-01 ENCOUNTER — APPOINTMENT (OUTPATIENT)
Dept: GENERAL RADIOLOGY | Age: 63
End: 2018-09-01
Payer: MEDICARE

## 2018-09-01 VITALS
HEIGHT: 59 IN | WEIGHT: 126 LBS | TEMPERATURE: 99 F | BODY MASS INDEX: 25.4 KG/M2 | DIASTOLIC BLOOD PRESSURE: 95 MMHG | OXYGEN SATURATION: 93 % | RESPIRATION RATE: 18 BRPM | SYSTOLIC BLOOD PRESSURE: 157 MMHG | HEART RATE: 96 BPM

## 2018-09-01 DIAGNOSIS — R60.0 LEG EDEMA: Primary | ICD-10-CM

## 2018-09-01 LAB
ABSOLUTE EOS #: 0 K/UL (ref 0–0.4)
ABSOLUTE IMMATURE GRANULOCYTE: 1.46 K/UL (ref 0–0.3)
ABSOLUTE LYMPH #: 2 K/UL (ref 1–4.8)
ABSOLUTE MONO #: 1.06 K/UL (ref 0.1–0.8)
ALBUMIN SERPL-MCNC: 3.9 G/DL (ref 3.5–5.2)
ALBUMIN/GLOBULIN RATIO: 1 (ref 1–2.5)
ALP BLD-CCNC: 55 U/L (ref 35–104)
ALT SERPL-CCNC: 15 U/L (ref 5–33)
ANION GAP SERPL CALCULATED.3IONS-SCNC: 14 MMOL/L (ref 9–17)
AST SERPL-CCNC: 23 U/L
BASOPHILS # BLD: 0 % (ref 0–2)
BASOPHILS ABSOLUTE: 0 K/UL (ref 0–0.2)
BILIRUB SERPL-MCNC: 0.52 MG/DL (ref 0.3–1.2)
BNP INTERPRETATION: ABNORMAL
BUN BLDV-MCNC: 13 MG/DL (ref 8–23)
BUN/CREAT BLD: ABNORMAL (ref 9–20)
CALCIUM SERPL-MCNC: 9.4 MG/DL (ref 8.6–10.4)
CHLORIDE BLD-SCNC: 97 MMOL/L (ref 98–107)
CO2: 26 MMOL/L (ref 20–31)
CREAT SERPL-MCNC: 0.78 MG/DL (ref 0.5–0.9)
DIFFERENTIAL TYPE: ABNORMAL
EKG ATRIAL RATE: 106 BPM
EKG P AXIS: 58 DEGREES
EKG P-R INTERVAL: 148 MS
EKG Q-T INTERVAL: 308 MS
EKG QRS DURATION: 74 MS
EKG QTC CALCULATION (BAZETT): 409 MS
EKG R AXIS: 36 DEGREES
EKG T AXIS: 38 DEGREES
EKG VENTRICULAR RATE: 106 BPM
EOSINOPHILS RELATIVE PERCENT: 0 % (ref 1–4)
GFR AFRICAN AMERICAN: >60 ML/MIN
GFR NON-AFRICAN AMERICAN: >60 ML/MIN
GFR SERPL CREATININE-BSD FRML MDRD: ABNORMAL ML/MIN/{1.73_M2}
GFR SERPL CREATININE-BSD FRML MDRD: ABNORMAL ML/MIN/{1.73_M2}
GLUCOSE BLD-MCNC: 99 MG/DL (ref 70–99)
HCT VFR BLD CALC: 44.7 % (ref 36.3–47.1)
HEMOGLOBIN: 13.8 G/DL (ref 11.9–15.1)
IMMATURE GRANULOCYTES: 11 %
LYMPHOCYTES # BLD: 15 % (ref 24–44)
MCH RBC QN AUTO: 33.5 PG (ref 25.2–33.5)
MCHC RBC AUTO-ENTMCNC: 30.9 G/DL (ref 28.4–34.8)
MCV RBC AUTO: 108.5 FL (ref 82.6–102.9)
MONOCYTES # BLD: 8 % (ref 1–7)
MORPHOLOGY: ABNORMAL
NRBC AUTOMATED: 1.1 PER 100 WBC
NUCLEATED RED BLOOD CELLS: 1 PER 100 WBC
PDW BLD-RTO: 14.6 % (ref 11.8–14.4)
PLATELET # BLD: 489 K/UL (ref 138–453)
PLATELET ESTIMATE: ABNORMAL
PMV BLD AUTO: 8.8 FL (ref 8.1–13.5)
POC TROPONIN I: 0 NG/ML (ref 0–0.1)
POC TROPONIN I: 0.01 NG/ML (ref 0–0.1)
POC TROPONIN INTERP: NORMAL
POC TROPONIN INTERP: NORMAL
POTASSIUM SERPL-SCNC: 4.2 MMOL/L (ref 3.7–5.3)
PRO-BNP: 332 PG/ML
RBC # BLD: 4.12 M/UL (ref 3.95–5.11)
RBC # BLD: ABNORMAL 10*6/UL
SEG NEUTROPHILS: 66 % (ref 36–66)
SEGMENTED NEUTROPHILS ABSOLUTE COUNT: 8.78 K/UL (ref 1.8–7.7)
SODIUM BLD-SCNC: 137 MMOL/L (ref 135–144)
TOTAL PROTEIN: 7.8 G/DL (ref 6.4–8.3)
WBC # BLD: 13.3 K/UL (ref 3.5–11.3)
WBC # BLD: ABNORMAL 10*3/UL

## 2018-09-01 PROCEDURE — 84484 ASSAY OF TROPONIN QUANT: CPT

## 2018-09-01 PROCEDURE — 93005 ELECTROCARDIOGRAM TRACING: CPT

## 2018-09-01 PROCEDURE — 85025 COMPLETE CBC W/AUTO DIFF WBC: CPT

## 2018-09-01 PROCEDURE — 80053 COMPREHEN METABOLIC PANEL: CPT

## 2018-09-01 PROCEDURE — 99284 EMERGENCY DEPT VISIT MOD MDM: CPT

## 2018-09-01 PROCEDURE — 83880 ASSAY OF NATRIURETIC PEPTIDE: CPT

## 2018-09-01 PROCEDURE — 71046 X-RAY EXAM CHEST 2 VIEWS: CPT

## 2018-09-01 RX ORDER — HYDROCHLOROTHIAZIDE 12.5 MG/1
12.5 CAPSULE, GELATIN COATED ORAL EVERY MORNING
Status: ON HOLD | COMMUNITY
End: 2018-11-13 | Stop reason: HOSPADM

## 2018-09-01 ASSESSMENT — ENCOUNTER SYMPTOMS
NAUSEA: 0
COLOR CHANGE: 0
ABDOMINAL PAIN: 0
COUGH: 0
DIARRHEA: 0
SHORTNESS OF BREATH: 0
BACK PAIN: 0
VOMITING: 0

## 2018-09-01 NOTE — ED PROVIDER NOTES
chronic ([facility]     Hypertension     Migraine     Multiple transfusions     Osteoarthritis     hands    Scoliosis     Substance abuse     ivda and cocaine abuse in past       SURGICAL HISTORY           Procedure Laterality Date    APPENDECTOMY  2017     SECTION      GASTRIC BYPASS SURGERY      LAPAROSCOPIC APPENDECTOMY N/A 3/27/2017    APPENDECTOMY LAPAROSCOPIC performed by Jesús Jarrett MD at [address] [place] [address]      hgsil    UPPER GASTROINTESTINAL ENDOSCOPY      oesophagitis, candidiasis       CURRENT MEDICATIONS       Current Discharge Medication List      CONTINUE these medications which have NOT CHANGED    Details   hydrochlorothiazide (MICROZIDE) 12.5 MG capsule Take 12.5 mg by mouth every morning      albuterol sulfate HFA (PROVENTIL HFA) 108 (90 Base) MCG/ACT inhaler Inhale 1-2 puffs into the lungs every 4 hours as needed for Wheezing or Shortness of Breath (Space out to every 6 hours as symptoms improve)  Qty: 1 Inhaler, Refills: 5    Associated Diagnoses: COPD, moderate (HCC)      acetaminophen (APAP EXTRA STRENGTH) 500 MG tablet Take 2 tablets by mouth every 8 hours as needed for Pain  Qty: 60 tablet, Refills: 0      ipratropium-albuterol (DUONEB) 0.5-2.5 (3) MG/3ML SOLN nebulizer solution Inhale 3 mLs into the lungs every 4 hours as needed for Shortness of Breath  Qty: 360 mL, Refills: 2      gabapentin (NEURONTIN) 300 MG capsule Take 1 capsule by mouth 3 times daily for 120 days. Mickey Plazaerin capsule, Refills: 3      lisinopril (PRINIVIL;ZESTRIL) 10 MG tablet Take 1 tablet by mouth daily  Qty: 30 tablet, Refills: 3      metoprolol succinate (TOPROL XL) 25 MG extended release tablet Take 1 tablet by mouth daily  Qty: 30 tablet, Refills: 3      amLODIPine (NORVASC) 5 MG tablet Take 1 tablet by mouth daily  Qty: 30 tablet, Refills: 3      Multiple Vitamins-Minerals (THERAPEUTIC MULTIVITAMIN-MINERALS) tablet Take 1 tablet by mouth daily  Qty: 30 tablet, Refills: 3 cyclobenzaprine (FLEXERIL) 5 MG tablet Take 5 mg by mouth 2 times daily as needed for Muscle spasms      SUBOXONE 8-2 MG FILM              ALLERGIES     Aspirin; Keflet [cephalexin]; and Motrin [ibuprofen]  Reviewed  FAMILY HISTORY     No family history on file. No family status information on file. SOCIAL HISTORY      reports that she has been smoking Cigarettes. She has a 40.00 pack-year smoking history. She has never used smokeless tobacco. She reports that she does not drink alcohol or use drugs. PHYSICAL EXAM    (up to 7 for level 4, 8 or more for level 5)     Vitals:    09/01/18 1113 09/01/18 1331 09/01/18 1334 09/01/18 1509   BP: (!) 168/93 (!) 157/95     Pulse: 108   96   Resp:   18    Temp: 99 °F (37.2 °C)      TempSrc: Oral      SpO2: 96% 93%     Weight: 126 lb (57.2 kg)      Height: 4' 11\" (1.499 m)          Physical Exam   Constitutional: She is oriented to person, place, and time. She appears well-developed and well-nourished. No distress. HENT:   Head: Normocephalic and atraumatic. Patient speaking in full sentences. Handling her secretions well. No trismus. No airway discrepancies. Eyes: Pupils are equal, round, and reactive to light. Conjunctivae and EOM are normal.   Neck: Normal range of motion. Neck supple. Cardiovascular: Normal rate, regular rhythm, normal heart sounds and intact distal pulses. Exam reveals no gallop and no friction rub. No murmur heard. Pulmonary/Chest: Effort normal. No tachypnea. No respiratory distress. She exhibits no tenderness. Bilateral rhonchi. Abdominal: Soft. Bowel sounds are normal. She exhibits no distension and no mass. There is no tenderness. There is no rebound and no guarding. Musculoskeletal: Normal range of motion. Right foot: There is swelling. There is normal range of motion, no tenderness, no bony tenderness, normal capillary refill, no crepitus, no deformity and no laceration. Left foot: There is swelling. There is normal range of motion, no tenderness, no bony tenderness, normal capillary refill, no crepitus, no deformity and no laceration. Nonpitting edema noted to bilateral feet. No calf pain. No palpable cords. Cap refills less than 2 seconds in all extremities. Light sensation is intact. Proprioception is within normal limits. Patient is ambulatory. Motor function is 5 out of 5 bilaterally in all extremities. All compartments are soft and compressible. No septic joints noted. Distal pulses and deep tendon reflexes are within normal limits. Neurological: She is alert and oriented to person, place, and time. She has normal reflexes. No cranial nerve deficit. Skin: Skin is warm and dry. She is not diaphoretic. Psychiatric: She has a normal mood and affect. Her behavior is normal. Judgment and thought content normal.         DIAGNOSTIC RESULTS     EKG: All EKG's are interpreted by Khurram Ram PA-C in the absence of a cardiologist.    Reviewed by attending physician. RADIOLOGY:   Non-plain film images such as CT, Ultrasound and MRI are read by the radiologist. Plain radiographic images are visualized and preliminarily interpreted by Khurram Ram PA-C with the below findings:    See below. Interpretation per the Radiologist below, if available at the time of this note:    XR CHEST STANDARD (2 VW)   Final Result   1. Cardiomegaly with mild vascular congestion.                LABS:  Labs Reviewed   CBC WITH AUTO DIFFERENTIAL - Abnormal; Notable for the following:        Result Value    WBC 13.3 (*)     .5 (*)     RDW 14.6 (*)     Platelets 476 (*)     NRBC Automated 1.1 (*)     Immature Granulocytes 11 (*)     Lymphocytes 15 (*)     Monocytes 8 (*)     Eosinophils % 0 (*)     nRBC 1 (*)     Absolute Immature Granulocyte 1.46 (*)     Segs Absolute 8.78 (*)     Absolute Mono # 1.06 (*)     All other components within normal limits   BRAIN NATRIURETIC PEPTIDE - Abnormal; Notable for the following: Pro- (*)     All other components within normal limits   COMPREHENSIVE METABOLIC PANEL - Abnormal; Notable for the following:     Chloride 97 (*)     All other components within normal limits   POCT TROPONIN   POCT TROPONIN   POCT TROPONIN   POCT TROPONIN           EMERGENCY DEPARTMENT COURSE and DIFFERENTIAL DIAGNOSIS/MDM:   Vitals:    Vitals:    09/01/18 1113 09/01/18 1331 09/01/18 1334 09/01/18 1509   BP: (!) 168/93 (!) 157/95     Pulse: 108   96   Resp:   18    Temp: 99 °F (37.2 °C)      TempSrc: Oral      SpO2: 96% 93%     Weight: 126 lb (57.2 kg)      Height: 4' 11\" (1.499 m)          This is a 40-year-old female presenting to the emergency department with bilateral feet swelling. There are bilateral rhonchi noted in the lungs and patient has significant history of COPD so we will perform a cardiac workup at this time including a BNP. BNP is improved and lab work is unremarkable at this time. Troponin is 0. We will discharge the patient home in stable condition and nontoxic-appearing. Patient was told to follow-up with her primary care physician in 3 days. Patient understood and will comply. Patient is satisfied. All questions answered. Attending physician, myself, and patient agree no further workup is necessary at this time. Patient was given very strict return protocols and is completely agreeable with this plan. She was told to continue to take the hydrochlorothiazide as prescribed and her family physician may need to increase the hydrochlorothiazide. This patient was seen by the attending physician and they agreed with the assessment and plan. CONSULTS:  None    PROCEDURES:  Procedures    FINAL IMPRESSION      1.  Leg edema          DISPOSITION/PLAN   DISPOSITION        PATIENT REFERRED TO:  Lawrence Arredondo  [address] [facility]  [phone]    Schedule an appointment as soon as possible for a visit in 3 days  For [facility] ED  [address] [place] [address]

## 2018-09-01 NOTE — ED PROVIDER NOTES
[address] [facility]     Emergency Department     Faculty Attestation    I performed a history and physical examination of the patient and discussed management with the resident. I reviewed the residents note and agree with the documented findings and plan of care. Any areas of disagreement are noted on the chart. I was personally present for the key portions of any procedures. I have documented in the chart those procedures where I was not present during the key portions. I have reviewed the emergency nurses triage note. I agree with the chief complaint, past medical history, past surgical history, allergies, medications, social and family history as documented unless otherwise noted below. For Physician Assistant/ Nurse Practitioner cases/documentation I have personally evaluated this patient and have completed at least one if not all key elements of the E/M (history, physical exam, and MDM). Additional findings are as noted. Patient with bilateral lower extremity swelling for the last week. Was admitted to the hospital order this month for dyspnea but she says that was COPD not CHF. No chest pain or shortness of breath. On exam resting comfortably. Bilateral rales but more rhonchi no wheezing but no respiratory distress. Speaking in full sentences. Heart regular. 2+ pulses radial and pedal bilaterally. Does have 2+ pitting edema up to the knee but no calf tenderness no asymmetry. We'll check labs EKG chest x-ray, then reevaluate    EKG interpretation:sinus tachycardia 106 normal intervals. Normal axis. No acute ST or T changes.   Similar morphologies to 8/11/18      Critical Care     None    Mag Lopez MD, [facility]  Attending Emergency  Physician             Mag Lopez MD  09/01/18 8673

## 2018-09-04 ENCOUNTER — TELEPHONE (OUTPATIENT)
Dept: OTHER | Facility: CLINIC | Age: 63
End: 2018-09-04

## 2018-09-04 NOTE — TELEPHONE ENCOUNTER
Attempted 4 times to schedule appt  With Dr. Morelia Yuan and call keeps getting disconnected.     They took my number and will call us back

## 2018-09-04 NOTE — TELEPHONE ENCOUNTER
Have not heard back from doctors office about ER f/u appt. Called office again, was transferred to Dr [employer]  and was disconnected again.

## 2018-09-05 ENCOUNTER — TELEPHONE (OUTPATIENT)
Dept: OTHER | Facility: CLINIC | Age: 63
End: 2018-09-05

## 2018-09-05 ENCOUNTER — APPOINTMENT (OUTPATIENT)
Dept: CT IMAGING | Age: 63
DRG: 139 | End: 2018-09-05
Payer: MEDICARE

## 2018-09-05 ENCOUNTER — HOSPITAL ENCOUNTER (INPATIENT)
Age: 63
LOS: 2 days | Discharge: HOME | DRG: 139 | End: 2018-09-07
Attending: EMERGENCY MEDICINE | Admitting: INTERNAL MEDICINE
Payer: MEDICARE

## 2018-09-05 ENCOUNTER — APPOINTMENT (OUTPATIENT)
Dept: GENERAL RADIOLOGY | Age: 63
DRG: 139 | End: 2018-09-05
Payer: MEDICARE

## 2018-09-05 DIAGNOSIS — I50.9 ACUTE ON CHRONIC CONGESTIVE HEART FAILURE, UNSPECIFIED HEART FAILURE TYPE (HCC): Primary | ICD-10-CM

## 2018-09-05 DIAGNOSIS — J18.9 MULTIFOCAL PNEUMONIA: ICD-10-CM

## 2018-09-05 LAB
-: ABNORMAL
ABSOLUTE EOS #: 0 K/UL (ref 0–0.4)
ABSOLUTE IMMATURE GRANULOCYTE: 1.73 K/UL (ref 0–0.3)
ABSOLUTE LYMPH #: 2.51 K/UL (ref 1–4.8)
ABSOLUTE MONO #: 0.79 K/UL (ref 0.1–0.8)
ALBUMIN SERPL-MCNC: 3.3 G/DL (ref 3.5–5.2)
ALBUMIN/GLOBULIN RATIO: 0.9 (ref 1–2.5)
ALP BLD-CCNC: 46 U/L (ref 35–104)
ALT SERPL-CCNC: 11 U/L (ref 5–33)
AMORPHOUS: ABNORMAL
ANION GAP SERPL CALCULATED.3IONS-SCNC: 14 MMOL/L (ref 9–17)
AST SERPL-CCNC: 15 U/L
BACTERIA: ABNORMAL
BASOPHILS # BLD: 0 % (ref 0–2)
BASOPHILS ABSOLUTE: 0 K/UL (ref 0–0.2)
BILIRUB SERPL-MCNC: 0.39 MG/DL (ref 0.3–1.2)
BILIRUBIN URINE: NEGATIVE
BNP INTERPRETATION: NORMAL
BUN BLDV-MCNC: 19 MG/DL (ref 8–23)
BUN/CREAT BLD: ABNORMAL (ref 9–20)
CALCIUM SERPL-MCNC: 9 MG/DL (ref 8.6–10.4)
CASTS UA: ABNORMAL /LPF (ref 0–8)
CHLORIDE BLD-SCNC: 107 MMOL/L (ref 98–107)
CO2: 23 MMOL/L (ref 20–31)
COLOR: YELLOW
CREAT SERPL-MCNC: 0.68 MG/DL (ref 0.5–0.9)
CRYSTALS, UA: ABNORMAL /HPF
DIFFERENTIAL TYPE: ABNORMAL
EKG ATRIAL RATE: 105 BPM
EKG P AXIS: 60 DEGREES
EKG P-R INTERVAL: 138 MS
EKG Q-T INTERVAL: 320 MS
EKG QRS DURATION: 68 MS
EKG QTC CALCULATION (BAZETT): 422 MS
EKG R AXIS: 33 DEGREES
EKG T AXIS: 60 DEGREES
EKG VENTRICULAR RATE: 105 BPM
EOSINOPHILS RELATIVE PERCENT: 0 % (ref 1–4)
EPITHELIAL CELLS UA: ABNORMAL /HPF (ref 0–5)
GFR AFRICAN AMERICAN: >60 ML/MIN
GFR NON-AFRICAN AMERICAN: >60 ML/MIN
GFR SERPL CREATININE-BSD FRML MDRD: ABNORMAL ML/MIN/{1.73_M2}
GFR SERPL CREATININE-BSD FRML MDRD: ABNORMAL ML/MIN/{1.73_M2}
GLUCOSE BLD-MCNC: 105 MG/DL (ref 70–99)
GLUCOSE URINE: NEGATIVE
HCT VFR BLD CALC: 37.5 % (ref 36.3–47.1)
HEMOGLOBIN: 11.6 G/DL (ref 11.9–15.1)
IMMATURE GRANULOCYTES: 11 %
KETONES, URINE: NEGATIVE
LACTIC ACID, SEPSIS WHOLE BLOOD: 2 MMOL/L (ref 0.5–1.9)
LACTIC ACID, SEPSIS: ABNORMAL MMOL/L (ref 0.5–1.9)
LEUKOCYTE ESTERASE, URINE: NEGATIVE
LYMPHOCYTES # BLD: 16 % (ref 24–44)
MAGNESIUM: 1.7 MG/DL (ref 1.6–2.6)
MCH RBC QN AUTO: 33.4 PG (ref 25.2–33.5)
MCHC RBC AUTO-ENTMCNC: 30.9 G/DL (ref 28.4–34.8)
MCV RBC AUTO: 108.1 FL (ref 82.6–102.9)
MONOCYTES # BLD: 5 % (ref 1–7)
MORPHOLOGY: ABNORMAL
MUCUS: ABNORMAL
NITRITE, URINE: NEGATIVE
NRBC AUTOMATED: 0.6 PER 100 WBC
NUCLEATED RED BLOOD CELLS: 1 PER 100 WBC
OTHER OBSERVATIONS UA: ABNORMAL
PDW BLD-RTO: 15.2 % (ref 11.8–14.4)
PH UA: 8 (ref 5–8)
PLATELET # BLD: 486 K/UL (ref 138–453)
PLATELET ESTIMATE: ABNORMAL
PMV BLD AUTO: 8.6 FL (ref 8.1–13.5)
POTASSIUM SERPL-SCNC: 4.3 MMOL/L (ref 3.7–5.3)
PRO-BNP: 216 PG/ML
PROTEIN UA: NEGATIVE
RBC # BLD: 3.47 M/UL (ref 3.95–5.11)
RBC # BLD: ABNORMAL 10*6/UL
RBC UA: ABNORMAL /HPF (ref 0–4)
RENAL EPITHELIAL, UA: ABNORMAL /HPF
SEG NEUTROPHILS: 68 % (ref 36–66)
SEGMENTED NEUTROPHILS ABSOLUTE COUNT: 10.67 K/UL (ref 1.8–7.7)
SODIUM BLD-SCNC: 144 MMOL/L (ref 135–144)
SPECIFIC GRAVITY UA: 1.04 (ref 1–1.03)
THYROXINE, FREE: 1.03 NG/DL (ref 0.93–1.7)
TOTAL PROTEIN: 6.9 G/DL (ref 6.4–8.3)
TRICHOMONAS: ABNORMAL
TROPONIN INTERP: NORMAL
TROPONIN INTERP: NORMAL
TROPONIN T: <0.03 NG/ML
TROPONIN T: <0.03 NG/ML
TSH SERPL DL<=0.05 MIU/L-ACNC: 0.23 MIU/L (ref 0.3–5)
TURBIDITY: CLEAR
URINE HGB: NEGATIVE
UROBILINOGEN, URINE: NORMAL
WBC # BLD: 15.7 K/UL (ref 3.5–11.3)
WBC # BLD: ABNORMAL 10*3/UL
WBC UA: ABNORMAL /HPF (ref 0–5)
YEAST: ABNORMAL

## 2018-09-05 PROCEDURE — 84439 ASSAY OF FREE THYROXINE: CPT

## 2018-09-05 PROCEDURE — 83880 ASSAY OF NATRIURETIC PEPTIDE: CPT

## 2018-09-05 PROCEDURE — 93005 ELECTROCARDIOGRAM TRACING: CPT

## 2018-09-05 PROCEDURE — 84484 ASSAY OF TROPONIN QUANT: CPT

## 2018-09-05 PROCEDURE — 85025 COMPLETE CBC W/AUTO DIFF WBC: CPT

## 2018-09-05 PROCEDURE — 1200000000 HC SEMI PRIVATE

## 2018-09-05 PROCEDURE — 6360000002 HC RX W HCPCS: Performed by: STUDENT IN AN ORGANIZED HEALTH CARE EDUCATION/TRAINING PROGRAM

## 2018-09-05 PROCEDURE — 6360000002 HC RX W HCPCS: Performed by: EMERGENCY MEDICINE

## 2018-09-05 PROCEDURE — 80053 COMPREHEN METABOLIC PANEL: CPT

## 2018-09-05 PROCEDURE — 83605 ASSAY OF LACTIC ACID: CPT

## 2018-09-05 PROCEDURE — 71260 CT THORAX DX C+: CPT

## 2018-09-05 PROCEDURE — 2580000003 HC RX 258: Performed by: INTERNAL MEDICINE

## 2018-09-05 PROCEDURE — 2580000003 HC RX 258: Performed by: EMERGENCY MEDICINE

## 2018-09-05 PROCEDURE — 71045 X-RAY EXAM CHEST 1 VIEW: CPT

## 2018-09-05 PROCEDURE — 83735 ASSAY OF MAGNESIUM: CPT

## 2018-09-05 PROCEDURE — 6360000004 HC RX CONTRAST MEDICATION: Performed by: EMERGENCY MEDICINE

## 2018-09-05 PROCEDURE — 99285 EMERGENCY DEPT VISIT HI MDM: CPT

## 2018-09-05 PROCEDURE — 87040 BLOOD CULTURE FOR BACTERIA: CPT

## 2018-09-05 PROCEDURE — 36415 COLL VENOUS BLD VENIPUNCTURE: CPT

## 2018-09-05 PROCEDURE — 6370000000 HC RX 637 (ALT 250 FOR IP): Performed by: STUDENT IN AN ORGANIZED HEALTH CARE EDUCATION/TRAINING PROGRAM

## 2018-09-05 PROCEDURE — 84443 ASSAY THYROID STIM HORMONE: CPT

## 2018-09-05 PROCEDURE — 6370000000 HC RX 637 (ALT 250 FOR IP): Performed by: EMERGENCY MEDICINE

## 2018-09-05 PROCEDURE — 81001 URINALYSIS AUTO W/SCOPE: CPT

## 2018-09-05 RX ORDER — POTASSIUM CHLORIDE 7.45 MG/ML
10 INJECTION INTRAVENOUS PRN
Status: DISCONTINUED | OUTPATIENT
Start: 2018-09-05 | End: 2018-09-07 | Stop reason: HOSPADM

## 2018-09-05 RX ORDER — HYDROCHLOROTHIAZIDE 12.5 MG/1
12.5 CAPSULE, GELATIN COATED ORAL EVERY MORNING
Status: DISCONTINUED | OUTPATIENT
Start: 2018-09-06 | End: 2018-09-05

## 2018-09-05 RX ORDER — SODIUM CHLORIDE 0.9 % (FLUSH) 0.9 %
10 SYRINGE (ML) INJECTION PRN
Status: DISCONTINUED | OUTPATIENT
Start: 2018-09-05 | End: 2018-09-07 | Stop reason: HOSPADM

## 2018-09-05 RX ORDER — PANTOPRAZOLE SODIUM 40 MG/1
40 TABLET, DELAYED RELEASE ORAL
Status: DISCONTINUED | OUTPATIENT
Start: 2018-09-06 | End: 2018-09-07 | Stop reason: HOSPADM

## 2018-09-05 RX ORDER — ALBUTEROL SULFATE 90 UG/1
2 AEROSOL, METERED RESPIRATORY (INHALATION) EVERY 4 HOURS PRN
Status: DISCONTINUED | OUTPATIENT
Start: 2018-09-05 | End: 2018-09-06

## 2018-09-05 RX ORDER — METOPROLOL SUCCINATE 25 MG/1
25 TABLET, EXTENDED RELEASE ORAL DAILY
Status: DISCONTINUED | OUTPATIENT
Start: 2018-09-05 | End: 2018-09-05

## 2018-09-05 RX ORDER — M-VIT,TX,IRON,MINS/CALC/FOLIC 27MG-0.4MG
1 TABLET ORAL DAILY
Status: DISCONTINUED | OUTPATIENT
Start: 2018-09-05 | End: 2018-09-07 | Stop reason: HOSPADM

## 2018-09-05 RX ORDER — HYDROCHLOROTHIAZIDE 12.5 MG/1
25 CAPSULE, GELATIN COATED ORAL EVERY MORNING
Status: DISCONTINUED | OUTPATIENT
Start: 2018-09-06 | End: 2018-09-07 | Stop reason: HOSPADM

## 2018-09-05 RX ORDER — POTASSIUM CHLORIDE 20MEQ/15ML
40 LIQUID (ML) ORAL PRN
Status: DISCONTINUED | OUTPATIENT
Start: 2018-09-05 | End: 2018-09-07 | Stop reason: HOSPADM

## 2018-09-05 RX ORDER — FUROSEMIDE 10 MG/ML
20 INJECTION INTRAMUSCULAR; INTRAVENOUS ONCE
Status: COMPLETED | OUTPATIENT
Start: 2018-09-05 | End: 2018-09-05

## 2018-09-05 RX ORDER — AMLODIPINE BESYLATE 5 MG/1
5 TABLET ORAL DAILY
Status: DISCONTINUED | OUTPATIENT
Start: 2018-09-05 | End: 2018-09-06

## 2018-09-05 RX ORDER — SODIUM CHLORIDE 0.9 % (FLUSH) 0.9 %
10 SYRINGE (ML) INJECTION EVERY 12 HOURS SCHEDULED
Status: DISCONTINUED | OUTPATIENT
Start: 2018-09-05 | End: 2018-09-05

## 2018-09-05 RX ORDER — ONDANSETRON 2 MG/ML
4 INJECTION INTRAMUSCULAR; INTRAVENOUS EVERY 6 HOURS PRN
Status: DISCONTINUED | OUTPATIENT
Start: 2018-09-05 | End: 2018-09-07 | Stop reason: HOSPADM

## 2018-09-05 RX ORDER — GABAPENTIN 300 MG/1
300 CAPSULE ORAL 3 TIMES DAILY
Status: DISCONTINUED | OUTPATIENT
Start: 2018-09-05 | End: 2018-09-07 | Stop reason: HOSPADM

## 2018-09-05 RX ORDER — ACETAMINOPHEN 325 MG/1
650 TABLET ORAL EVERY 4 HOURS PRN
Status: DISCONTINUED | OUTPATIENT
Start: 2018-09-05 | End: 2018-09-05

## 2018-09-05 RX ORDER — LISINOPRIL 10 MG/1
10 TABLET ORAL DAILY
Status: DISCONTINUED | OUTPATIENT
Start: 2018-09-05 | End: 2018-09-07 | Stop reason: HOSPADM

## 2018-09-05 RX ORDER — BUPRENORPHINE HYDROCHLORIDE AND NALOXONE HYDROCHLORIDE DIHYDRATE 8; 2 MG/1; MG/1
1 TABLET SUBLINGUAL DAILY
Status: DISCONTINUED | OUTPATIENT
Start: 2018-09-05 | End: 2018-09-07 | Stop reason: HOSPADM

## 2018-09-05 RX ORDER — SODIUM CHLORIDE 0.9 % (FLUSH) 0.9 %
10 SYRINGE (ML) INJECTION EVERY 12 HOURS SCHEDULED
Status: DISCONTINUED | OUTPATIENT
Start: 2018-09-05 | End: 2018-09-07 | Stop reason: HOSPADM

## 2018-09-05 RX ORDER — IPRATROPIUM BROMIDE AND ALBUTEROL SULFATE 2.5; .5 MG/3ML; MG/3ML
3 SOLUTION RESPIRATORY (INHALATION) EVERY 4 HOURS PRN
Status: DISCONTINUED | OUTPATIENT
Start: 2018-09-05 | End: 2018-09-07 | Stop reason: HOSPADM

## 2018-09-05 RX ORDER — LISINOPRIL AND HYDROCHLOROTHIAZIDE 12.5; 1 MG/1; MG/1
1 TABLET ORAL DAILY
Status: DISCONTINUED | OUTPATIENT
Start: 2018-09-05 | End: 2018-09-05

## 2018-09-05 RX ORDER — POTASSIUM CHLORIDE 20 MEQ/1
40 TABLET, EXTENDED RELEASE ORAL PRN
Status: DISCONTINUED | OUTPATIENT
Start: 2018-09-05 | End: 2018-09-07 | Stop reason: HOSPADM

## 2018-09-05 RX ORDER — ACETAMINOPHEN 325 MG/1
650 TABLET ORAL EVERY 8 HOURS PRN
Status: DISCONTINUED | OUTPATIENT
Start: 2018-09-05 | End: 2018-09-07 | Stop reason: HOSPADM

## 2018-09-05 RX ORDER — CYCLOBENZAPRINE HCL 5 MG
5 TABLET ORAL 2 TIMES DAILY PRN
Status: DISCONTINUED | OUTPATIENT
Start: 2018-09-05 | End: 2018-09-07 | Stop reason: HOSPADM

## 2018-09-05 RX ORDER — FUROSEMIDE 10 MG/ML
40 INJECTION INTRAMUSCULAR; INTRAVENOUS ONCE
Status: COMPLETED | OUTPATIENT
Start: 2018-09-06 | End: 2018-09-06

## 2018-09-05 RX ORDER — GUAIFENESIN 600 MG/1
600 TABLET, EXTENDED RELEASE ORAL 2 TIMES DAILY
Status: DISCONTINUED | OUTPATIENT
Start: 2018-09-05 | End: 2018-09-07 | Stop reason: HOSPADM

## 2018-09-05 RX ORDER — METOPROLOL SUCCINATE 25 MG/1
25 TABLET, EXTENDED RELEASE ORAL DAILY
Status: DISCONTINUED | OUTPATIENT
Start: 2018-09-05 | End: 2018-09-07 | Stop reason: HOSPADM

## 2018-09-05 RX ORDER — LEVOFLOXACIN 5 MG/ML
500 INJECTION, SOLUTION INTRAVENOUS EVERY 24 HOURS
Status: DISCONTINUED | OUTPATIENT
Start: 2018-09-05 | End: 2018-09-06 | Stop reason: ALTCHOICE

## 2018-09-05 RX ORDER — MAGNESIUM SULFATE 1 G/100ML
1 INJECTION INTRAVENOUS PRN
Status: DISCONTINUED | OUTPATIENT
Start: 2018-09-05 | End: 2018-09-07 | Stop reason: HOSPADM

## 2018-09-05 RX ADMIN — LEVOFLOXACIN 500 MG: 5 INJECTION, SOLUTION INTRAVENOUS at 18:32

## 2018-09-05 RX ADMIN — AMLODIPINE BESYLATE 5 MG: 5 TABLET ORAL at 21:09

## 2018-09-05 RX ADMIN — Medication 10 ML: at 21:10

## 2018-09-05 RX ADMIN — MULTIPLE VITAMINS W/ MINERALS TAB 1 TABLET: TAB at 21:09

## 2018-09-05 RX ADMIN — LISINOPRIL 10 MG: 10 TABLET ORAL at 21:09

## 2018-09-05 RX ADMIN — METOPROLOL SUCCINATE 25 MG: 25 TABLET, FILM COATED, EXTENDED RELEASE ORAL at 11:47

## 2018-09-05 RX ADMIN — ENOXAPARIN SODIUM 40 MG: 40 INJECTION SUBCUTANEOUS at 21:09

## 2018-09-05 RX ADMIN — VANCOMYCIN HYDROCHLORIDE 750 MG: 10 INJECTION, POWDER, LYOPHILIZED, FOR SOLUTION INTRAVENOUS at 14:29

## 2018-09-05 RX ADMIN — GABAPENTIN 300 MG: 300 CAPSULE ORAL at 21:09

## 2018-09-05 RX ADMIN — IOPAMIDOL 75 ML: 755 INJECTION, SOLUTION INTRAVENOUS at 13:42

## 2018-09-05 RX ADMIN — LISINOPRIL AND HYDROCHLOROTHIAZIDE 1 TABLET: 12.5; 1 TABLET ORAL at 11:47

## 2018-09-05 RX ADMIN — BUPRENORPHINE AND NALOXONE 1 TABLET: 8; 2 TABLET SUBLINGUAL at 21:09

## 2018-09-05 RX ADMIN — FUROSEMIDE 20 MG: 10 INJECTION, SOLUTION INTRAMUSCULAR; INTRAVENOUS at 14:29

## 2018-09-05 ASSESSMENT — PAIN SCALES - GENERAL
PAINLEVEL_OUTOF10: 0
PAINLEVEL_OUTOF10: 5
PAINLEVEL_OUTOF10: 4

## 2018-09-05 NOTE — ED NOTES
Blood specimen labeled correctly and sent to [facility].      Lizet [facility]  09/05/18 6790

## 2018-09-05 NOTE — ED PROVIDER NOTES
Floyd Hernandez  ED  Emergency Department  Emergency Medicine Resident Sign-out     Care of Emi Daniels was assumed from Dr. Jesus Dozier and is being seen for Respiratory Distress and Foot Swelling  . The patient's initial evaluation and plan have been discussed with the prior provider who initially evaluated the patient.      EMERGENCY DEPARTMENT COURSE / MEDICAL DECISION MAKING:       MEDICATIONS GIVEN:  Orders Placed This Encounter   Medications    sodium chloride flush 0.9 % injection 10 mL    sodium chloride flush 0.9 % injection 10 mL    metoprolol succinate (TOPROL XL) extended release tablet 25 mg    lisinopril-hydrochlorothiazide (PRINZIDE;ZESTORETIC) 10-12.5 MG per tablet 1 tablet    furosemide (LASIX) injection 20 mg    iopamidol (ISOVUE-370) 76 % injection 75 mL    vancomycin (VANCOCIN) 750 mg in dextrose 5 % 250 mL IVPB    levofloxacin (LEVAQUIN) 500 MG/100ML infusion 500 mg       LABS / RADIOLOGY:     Labs Reviewed   CBC WITH AUTO DIFFERENTIAL - Abnormal; Notable for the following:        Result Value    WBC 15.7 (*)     RBC 3.47 (*)     Hemoglobin 11.6 (*)     .1 (*)     RDW 15.2 (*)     Platelets 957 (*)     Immature Granulocytes 11 (*)     Seg Neutrophils 68 (*)     Lymphocytes 16 (*)     Eosinophils % 0 (*)     nRBC 1 (*)     Absolute Immature Granulocyte 1.73 (*)     Segs Absolute 10.67 (*)     All other components within normal limits   COMPREHENSIVE METABOLIC PANEL - Abnormal; Notable for the following:     Glucose 105 (*)     Alb 3.3 (*)     Albumin/Globulin Ratio 0.9 (*)     All other components within normal limits   LACTATE, SEPSIS - Abnormal; Notable for the following:     Lactic Acid, Sepsis, Whole Blood 2.0 (*)     All other components within normal limits   URINALYSIS WITH MICROSCOPIC - Abnormal; Notable for the following:     Specific Gravity, UA 1.041 (*)     All other components within normal limits   TSH WITH REFLEX - Abnormal; Notable for the following: 9/5/2018  EXAMINATION: SINGLE XRAY VIEW OF THE CHEST 9/5/2018 11:03 am COMPARISON: 09/01/2018 HISTORY: ORDERING SYSTEM PROVIDED HISTORY: Dyspnea TECHNOLOGIST PROVIDED HISTORY: Dyspnea FINDINGS: Mild cardiomegaly with vascular congestion. Linear retrocardiac opacity felt to represent atelectasis, increased since prior study. No effusion or pneumothorax. Osseous structures are intact. Cardiomegaly with mild vascular congestion. Ct Chest Pulmonary Embolism W Contrast    Result Date: 9/5/2018  EXAMINATION: CTA OF THE CHEST 9/5/2018 1:42 pm TECHNIQUE: CTA of the chest was performed after the administration of intravenous contrast.  Multiplanar reformatted images are provided for review. MIP images are provided for review. Dose modulation, iterative reconstruction, and/or weight based adjustment of the mA/kV was utilized to reduce the radiation dose to as low as reasonably achievable. COMPARISON: 08/11/2018 HISTORY: ORDERING SYSTEM PROVIDED HISTORY: r/o PE vs PNA - thanks, Bryan Frame FINDINGS: Pulmonary Arteries: Pulmonary arteries are adequately opacified for evaluation. No evidence of intraluminal filling defect to suggest pulmonary embolism. Main pulmonary artery is normal in caliber. Mediastinum: No evidence of mediastinal lymphadenopathy. The heart and pericardium demonstrate no acute abnormality. There is no acute abnormality of the thoracic aorta. Lungs/pleura: Previously described ground-glass infiltrates in the upper lobes bilaterally persist and appear to be more extensive. More focal consolidation seen in the right upper lobe inferiorly. New airspace opacification seen in the lingula and the right middle lobe. Scarring seen at the left lung base. No pleural effusion. No endobronchial lesion. Upper Abdomen: Limited images of the upper abdomen are unremarkable. Soft Tissues/Bones: No acute bone or soft tissue abnormality. 1. Negative for acute pulmonary embolism 2.  More extensive ground-glass considerations. 2. Minimal left basilar subsegmental atelectasis versus scar. 3. No pulmonary embolism. 4. Mild cardiomegaly. Ct Chest Pulmonary Embolism W Contrast    Result Date: 8/6/2018  EXAMINATION: CTA OF THE CHEST 8/6/2018 5:49 pm TECHNIQUE: CTA of the chest was performed after the administration of intravenous contrast.  Multiplanar reformatted images are provided for review. MIP images are provided for review. Dose modulation, iterative reconstruction, and/or weight based adjustment of the mA/kV was utilized to reduce the radiation dose to as low as reasonably achievable. COMPARISON: CT abdomen pelvis 03/26/2018 ; chest radiograph 08/04/2018 HISTORY: ORDERING SYSTEM PROVIDED HISTORY: Chest pain, elevated d-dimer FINDINGS: Pulmonary Arteries: Pulmonary arteries are adequately opacified for evaluation. No evidence of intraluminal filling defect to suggest pulmonary embolism. Main pulmonary artery is normal in caliber. Mediastinum: No evidence of mediastinal lymphadenopathy. The heart and pericardium demonstrate no acute abnormality. There is no acute abnormality of the thoracic aorta. Segmental gaseous distention of the esophagus with scattered air-fluid levels. Lungs/pleura: No pneumothorax or pleural effusion. Moderate diffuse bronchial wall thickening. Patchy ground-glass opacities in the upper lungs. Linear and bandlike areas of atelectasis and/ or scarring in the lower lungs. Dependent secretions within the distal trachea and left mainstem bronchus. Upper Abdomen: Limited images of the upper abdomen are unremarkable. Soft Tissues/Bones: No acute bone or soft tissue abnormality. S-shaped curvature of the thoracic spine. Heterogeneous thyroid gland with right thyroid lobe calcifications. Negative for acute pulmonary embolism. Diffuse bronchial wall thickening suggests airway inflammation, such as that seen with asthma, bronchitis, smoking or aspiration.   Consideration should be given to mass lesions. Asymmetry in the lower half the left breast persists on spot compression views but with suggestively present in 2011 and has no ultrasound corollary. Six-month mammographic follow-up of the left breast is advised. BIRADS: BIRADS - CATEGORY 3 Findings are probably benign. A short interval follow-up is recommended in 6 months. OVERALL ASSESSMENT -PROBABLY BENIGN. A letter of notification will be sent to the patient regarding the results. Us Breast Limited Left    Result Date: 8/13/2018  EXAMINATION: DIAGNOSTIC DIGITAL LEFT BREAST MAMMOGRAM; TARGETED ULTRASOUND OF THE LEFT BREAST 8/13/2018 11:38 am; 8/13/2018 11:47 am TECHNIQUE: Diagnostic mammography of the left breast was performed. Computer aided detection was utilized in the interpretation of this exam.; Target ultrasound of the left breast was performed. Views: MLO, 90 degree lateral and craniocaudal views of the left breast with 3D tomosynthesis; spot compression view of the left breast inferior aspect and MLO projection. COMPARISON: 2D image of the left breast of 27 July 2018.; 22 September 2011 HISTORY: ORDERING SYSTEM PROVIDED HISTORY: Abnormal mammography; ORDERING SYSTEM PROVIDED HISTORY: Abnormal mammogram Asymmetry inferior aspect of the left breast posterior 1/3. Positive family history of breast cancer; patient's mother diagnosed with breast cancer at age 59. Negative history of hormonal replacement therapy. No prior breast interventions. FINDINGS: Mammogram: The left breast is heterogeneously dense which somewhat lowers the sensitivity of the study and may obscure small masses. Focal asymmetry persists on spot compression view lower half of the left breast. Tomosynthesis suggests that this is in the lower inner portion of the left breast.  Although asymmetry is suggested on prior study of 2011, is slightly more prominent on today's study, and ultrasound lower inner left breast posterior 1/3 is advised.  Ultrasound: Targeted

## 2018-09-05 NOTE — ED PROVIDER NOTES
[facility]  eMERGENCY dEPARTMENT eNCOUnter      Pt Name: Eva Vincent  MRN: [id number]  [place] 1955  Date of evaluation: 9/5/2018  Provider: Moon Elmore MD    CHIEF COMPLAINT       Chief Complaint   Patient presents with    Respiratory Distress    Foot Swelling         HISTORY OF PRESENT ILLNESS   (Location/Symptom, Timing/Onset, Context/Setting, Quality, Duration, Modifying Factors, Severity)  Note limiting factors. Eva Vincent is a 70 y.o. female who presents to the emergency department      HPI    2 wks SOB  Admitted recently  3 days of foot swelling  No cough, feels could cough something up  Tachy and dyspneic    Still smoking  Forgot daily meds including metop and diuretic        Nursing Notes were reviewed. REVIEW OF SYSTEMS    (2-9 systems for level 4, 10 or more for level 5)     Review of Systems   Constitutional: Negative for appetite change, chills, fatigue and fever. HENT: Negative for drooling, facial swelling, mouth sores and voice change. Eyes: Negative for visual disturbance. Respiratory: Positive for cough, shortness of breath and wheezing. Cardiovascular: Positive for leg swelling. Negative for chest pain. Gastrointestinal: Negative for abdominal pain, diarrhea, nausea and vomiting. Genitourinary: Negative for difficulty urinating and dysuria. Musculoskeletal: Negative for neck stiffness. Skin: Negative for rash. Neurological: Negative for weakness and numbness. Psychiatric/Behavioral: Negative for agitation. All other systems reviewed and are negative. Except as noted above the remainder of the review of systems was reviewed and negative.        PAST MEDICAL HISTORY     Past Medical History:   Diagnosis Date    Chronic rhinitis     Cigarette smoker     COPD (chronic obstructive pulmonary disease) ([facility] 8/5/2018    Depression     Dysphagia     GERD (gastroesophageal reflux disease)     Hepatitis C, chronic (HCC)     Hypertension place, and time. Skin: Skin is warm and dry. Psychiatric: She has a normal mood and affect. DIAGNOSTIC RESULTS     EKG: All EKG's are interpreted by the Emergency Department Physician who either signs or Co-signs this chart in the absence of a cardiologist.      RADIOLOGY:   Non-plain film images such as CT, Ultrasound and MRI are read by the radiologist. Plain radiographic images are visualized and preliminarily interpreted by the emergency physician with the below findings:      Interpretation per the Radiologist below, if available at the time of this note:    [address] [place] [address]   Final Result   No ascites identified sonographically. CT CHEST PULMONARY EMBOLISM W CONTRAST   Final Result   1. Negative for acute pulmonary embolism   2. More extensive ground-glass opacification noted in the upper lobes   bilaterally with new airspace opacifications seen in the lingula of the left   upper lobe and in the right middle lobe. Appearance suggests a multifocal   pneumonia. 3. Stable scarring in the left lower lobe         XR CHEST PORTABLE   Final Result   Cardiomegaly with mild vascular congestion.                ED BEDSIDE ULTRASOUND:   Performed by ED Physician - none    LABS:  Labs Reviewed   CBC WITH AUTO DIFFERENTIAL - Abnormal; Notable for the following:        Result Value    WBC 15.7 (*)     RBC 3.47 (*)     Hemoglobin 11.6 (*)     .1 (*)     RDW 15.2 (*)     Platelets 001 (*)     Immature Granulocytes 11 (*)     Seg Neutrophils 68 (*)     Lymphocytes 16 (*)     Eosinophils % 0 (*)     nRBC 1 (*)     Absolute Immature Granulocyte 1.73 (*)     Segs Absolute 10.67 (*)     All other components within normal limits   COMPREHENSIVE METABOLIC PANEL - Abnormal; Notable for the following:     Glucose 105 (*)     Alb 3.3 (*)     Albumin/Globulin Ratio 0.9 (*)     All other components within normal limits   LACTATE, SEPSIS - Abnormal; Notable for the following:     Lactic Acid, Sepsis, Whole multivitamin-minerals 1 tablet (1 tablet Oral Given 9/6/18 0940)   buprenorphine-naloxone (SUBOXONE) 8-2 MG SL tablet 1 tablet (1 tablet Sublingual Given 9/6/18 0942)   sodium chloride flush 0.9 % injection 10 mL (not administered)   magnesium hydroxide (MILK OF MAGNESIA) 400 MG/5ML suspension 30 mL (not administered)   ondansetron (ZOFRAN) injection 4 mg (not administered)   enoxaparin (LOVENOX) injection 40 mg (40 mg Subcutaneous Given 9/6/18 0941)   potassium chloride (KLOR-CON M) extended release tablet 40 mEq (not administered)     Or   potassium chloride 20 MEQ/15ML (10%) oral solution 40 mEq (not administered)     Or   potassium chloride 10 mEq/100 mL IVPB (Peripheral Line) (not administered)   potassium chloride 10 mEq/100 mL IVPB (Peripheral Line) (not administered)   magnesium sulfate 1 g in dextrose 5% 100 mL IVPB (not administered)   guaiFENesin (MUCINEX) extended release tablet 600 mg (600 mg Oral Given 9/6/18 2124)   hydrochlorothiazide (MICROZIDE) capsule 25 mg (25 mg Oral Given 9/6/18 0940)   pantoprazole (PROTONIX) tablet 40 mg (40 mg Oral Given 9/6/18 0558)   albuterol sulfate  (90 Base) MCG/ACT inhaler 2 puff (2 puffs Inhalation Given 9/6/18 2125)   levofloxacin (LEVAQUIN) tablet 500 mg (500 mg Oral Given 9/6/18 1410)   furosemide (LASIX) injection 20 mg (20 mg Intravenous Given 9/5/18 1429)   iopamidol (ISOVUE-370) 76 % injection 75 mL (75 mLs Intravenous Given 9/5/18 1342)   vancomycin (VANCOCIN) 750 mg in dextrose 5 % 250 mL IVPB (0 mg Intravenous Stopped 9/5/18 1720)   furosemide (LASIX) injection 40 mg (40 mg Intravenous Given 9/6/18 0559)       New Prescriptions from this visit:    Current Discharge Medication List          Follow-up:  Grace Alvarenga  [address] [place] [id number] [phone]              Final Impression:   1. Acute on chronic congestive heart failure, unspecified heart failure type ([facility]    2.  Multifocal pneumonia               (Please note that portions of

## 2018-09-05 NOTE — TELEPHONE ENCOUNTER
Writer contacted  Cira Dutton , Dr. Paulina Null, ED provider to inform of 30 day readmission risk. ED provider informed writer of readmission.

## 2018-09-05 NOTE — DISCHARGE SUMMARY
[x] [facility]        Outpatient Physical                Therapy       [address]       Phone: [phone]       Fax: [phone]        Physical Therapy Discharge Note    Date: 2018      Patient: Chasity Alexis  : 1955  MRN: [id number]   Physician: Africa Watkins MD                             Insurance: [employer]  Medical Diagnosis: scoliosis(M41.9)                        Rehab Codes: acquired scoliosis (M41.9); low back pain (M54.5); muscle weakness (M62.81); abnormal posture (R29.3); spasms of muscle (M62.830)  Onset Date: ~2016                Next 's appt.: Cynthia 2 months from 2018)   Visit# / total visits: (additional 12 visits(6 weeks) referred from Dr. Nadine Lee -- added 12 addt'l visits to count on )             Cancels/No Shows: 3/0  G-codes at visit 20        Date of initial visit: 18               Date of final visit:       Subjective:  Pain:  [] Yes  [x] No  Location: low back   Pain Rating: (0-10 scale) 0/10  Pain altered Tx:  [x] No  [] Yes  Action:  Comments: 8/3/18 Pt reports concern of increased abdominal swelling with noticeable distention of abdomen. Pt states that she is seeing a physician regarding this problem    Objective:  Test Measurements: 18 (ABS 3-/5, BACK 4-/5)  Function: 18 (52% impairment)    Assessment:   STG: (to be met in 6 treatments)  1. ? Pain: MET 18  2. ? ROM:core 60 ° rotation bilateral and 70 ° of forward flexion MET 18  3. ? Strength:core 5-/5 strength of the abdominal muscles and 4+/5 strength of the low back NOT MET 18  (ABS 3-/5, BACK 4-/5)  4. ? Function:patient will have an Oswestry score of 40% or less impairment NOT MET 18 (52% impairment)  5. Independent with Home Exercise Programs MET 18  6. Demonstrate Knowledge of fall prevention MET 18     Long term goals to be met by visit  12  1.  Patient will be able to demonstrate good body mechanics going returned phone call from 8/29 PT clinic call. (Pt was called 8/9 and 8/29 to check on release)          Electronically signed by Marcia Rose PTA on 9/5/2018 at 8:47 AM      If you have any questions or concerns, please don't hesitate to call.   Thank you for your referral.

## 2018-09-06 ENCOUNTER — APPOINTMENT (OUTPATIENT)
Dept: ULTRASOUND IMAGING | Age: 63
DRG: 139 | End: 2018-09-06
Payer: MEDICARE

## 2018-09-06 PROBLEM — J13 PNEUMONIA DUE TO STREPTOCOCCUS PNEUMONIAE (HCC): Status: ACTIVE | Noted: 2018-09-05

## 2018-09-06 LAB
ABSOLUTE EOS #: 0.16 K/UL (ref 0–0.4)
ABSOLUTE IMMATURE GRANULOCYTE: 1.93 K/UL (ref 0–0.3)
ABSOLUTE LYMPH #: 2.25 K/UL (ref 1–4.8)
ABSOLUTE MONO #: 1.45 K/UL (ref 0.1–0.8)
ANION GAP SERPL CALCULATED.3IONS-SCNC: 13 MMOL/L (ref 9–17)
BASOPHILS # BLD: 0 % (ref 0–2)
BASOPHILS ABSOLUTE: 0 K/UL (ref 0–0.2)
BUN BLDV-MCNC: 17 MG/DL (ref 8–23)
BUN/CREAT BLD: ABNORMAL (ref 9–20)
CALCIUM SERPL-MCNC: 9.6 MG/DL (ref 8.6–10.4)
CHLORIDE BLD-SCNC: 103 MMOL/L (ref 98–107)
CHOLESTEROL/HDL RATIO: 1.8
CHOLESTEROL: 141 MG/DL
CO2: 24 MMOL/L (ref 20–31)
CREAT SERPL-MCNC: 0.71 MG/DL (ref 0.5–0.9)
DIFFERENTIAL TYPE: ABNORMAL
EOSINOPHILS RELATIVE PERCENT: 1 % (ref 1–4)
GFR AFRICAN AMERICAN: >60 ML/MIN
GFR NON-AFRICAN AMERICAN: >60 ML/MIN
GFR SERPL CREATININE-BSD FRML MDRD: ABNORMAL ML/MIN/{1.73_M2}
GFR SERPL CREATININE-BSD FRML MDRD: ABNORMAL ML/MIN/{1.73_M2}
GLUCOSE BLD-MCNC: 106 MG/DL (ref 70–99)
HCT VFR BLD CALC: 42.1 % (ref 36.3–47.1)
HDLC SERPL-MCNC: 80 MG/DL
HEMOGLOBIN: 12.6 G/DL (ref 11.9–15.1)
IMMATURE GRANULOCYTES: 12 %
INR BLD: 0.9
LDL CHOLESTEROL: 41 MG/DL (ref 0–130)
LYMPHOCYTES # BLD: 14 % (ref 24–44)
MCH RBC QN AUTO: 32.9 PG (ref 25.2–33.5)
MCHC RBC AUTO-ENTMCNC: 29.9 G/DL (ref 28.4–34.8)
MCV RBC AUTO: 109.9 FL (ref 82.6–102.9)
MONOCYTES # BLD: 9 % (ref 1–7)
MORPHOLOGY: ABNORMAL
NRBC AUTOMATED: 0.9 PER 100 WBC
PDW BLD-RTO: 15 % (ref 11.8–14.4)
PLATELET # BLD: 499 K/UL (ref 138–453)
PLATELET ESTIMATE: ABNORMAL
PMV BLD AUTO: 8.8 FL (ref 8.1–13.5)
POTASSIUM SERPL-SCNC: 4.6 MMOL/L (ref 3.7–5.3)
PROTHROMBIN TIME: 9.7 SEC (ref 9–12)
RBC # BLD: 3.83 M/UL (ref 3.95–5.11)
RBC # BLD: ABNORMAL 10*6/UL
SEG NEUTROPHILS: 64 % (ref 36–66)
SEGMENTED NEUTROPHILS ABSOLUTE COUNT: 10.31 K/UL (ref 1.8–7.7)
SODIUM BLD-SCNC: 140 MMOL/L (ref 135–144)
TRIGL SERPL-MCNC: 102 MG/DL
VLDLC SERPL CALC-MCNC: NORMAL MG/DL (ref 1–30)
WBC # BLD: 16.1 K/UL (ref 3.5–11.3)
WBC # BLD: ABNORMAL 10*3/UL

## 2018-09-06 PROCEDURE — 94640 AIRWAY INHALATION TREATMENT: CPT

## 2018-09-06 PROCEDURE — 85025 COMPLETE CBC W/AUTO DIFF WBC: CPT

## 2018-09-06 PROCEDURE — 97530 THERAPEUTIC ACTIVITIES: CPT

## 2018-09-06 PROCEDURE — 94762 N-INVAS EAR/PLS OXIMTRY CONT: CPT

## 2018-09-06 PROCEDURE — 6370000000 HC RX 637 (ALT 250 FOR IP): Performed by: STUDENT IN AN ORGANIZED HEALTH CARE EDUCATION/TRAINING PROGRAM

## 2018-09-06 PROCEDURE — 6360000002 HC RX W HCPCS: Performed by: STUDENT IN AN ORGANIZED HEALTH CARE EDUCATION/TRAINING PROGRAM

## 2018-09-06 PROCEDURE — 97165 OT EVAL LOW COMPLEX 30 MIN: CPT

## 2018-09-06 PROCEDURE — 6370000000 HC RX 637 (ALT 250 FOR IP): Performed by: EMERGENCY MEDICINE

## 2018-09-06 PROCEDURE — G8988 SELF CARE GOAL STATUS: HCPCS

## 2018-09-06 PROCEDURE — 97535 SELF CARE MNGMENT TRAINING: CPT

## 2018-09-06 PROCEDURE — 99406 BEHAV CHNG SMOKING 3-10 MIN: CPT

## 2018-09-06 PROCEDURE — 2580000003 HC RX 258: Performed by: INTERNAL MEDICINE

## 2018-09-06 PROCEDURE — S9441 ASTHMA EDUCATION: HCPCS

## 2018-09-06 PROCEDURE — G8979 MOBILITY GOAL STATUS: HCPCS

## 2018-09-06 PROCEDURE — G8989 SELF CARE D/C STATUS: HCPCS

## 2018-09-06 PROCEDURE — 76705 ECHO EXAM OF ABDOMEN: CPT

## 2018-09-06 PROCEDURE — 80048 BASIC METABOLIC PNL TOTAL CA: CPT

## 2018-09-06 PROCEDURE — 97162 PT EVAL MOD COMPLEX 30 MIN: CPT

## 2018-09-06 PROCEDURE — 99223 1ST HOSP IP/OBS HIGH 75: CPT | Performed by: INTERNAL MEDICINE

## 2018-09-06 PROCEDURE — 1200000000 HC SEMI PRIVATE

## 2018-09-06 PROCEDURE — G8978 MOBILITY CURRENT STATUS: HCPCS

## 2018-09-06 PROCEDURE — G8980 MOBILITY D/C STATUS: HCPCS

## 2018-09-06 PROCEDURE — G8987 SELF CARE CURRENT STATUS: HCPCS

## 2018-09-06 PROCEDURE — 85610 PROTHROMBIN TIME: CPT

## 2018-09-06 PROCEDURE — 97166 OT EVAL MOD COMPLEX 45 MIN: CPT

## 2018-09-06 PROCEDURE — 80061 LIPID PANEL: CPT

## 2018-09-06 PROCEDURE — 36415 COLL VENOUS BLD VENIPUNCTURE: CPT

## 2018-09-06 RX ORDER — ALBUTEROL SULFATE 90 UG/1
2 AEROSOL, METERED RESPIRATORY (INHALATION) 4 TIMES DAILY
Status: DISCONTINUED | OUTPATIENT
Start: 2018-09-06 | End: 2018-09-07 | Stop reason: HOSPADM

## 2018-09-06 RX ORDER — LEVOFLOXACIN 500 MG/1
500 TABLET, FILM COATED ORAL DAILY
Status: DISCONTINUED | OUTPATIENT
Start: 2018-09-06 | End: 2018-09-07 | Stop reason: HOSPADM

## 2018-09-06 RX ADMIN — LEVOFLOXACIN 500 MG: 500 TABLET, FILM COATED ORAL at 14:10

## 2018-09-06 RX ADMIN — ALBUTEROL SULFATE 2 PUFF: 90 AEROSOL, METERED RESPIRATORY (INHALATION) at 16:51

## 2018-09-06 RX ADMIN — GUAIFENESIN 600 MG: 600 TABLET, EXTENDED RELEASE ORAL at 09:42

## 2018-09-06 RX ADMIN — Medication 10 ML: at 21:13

## 2018-09-06 RX ADMIN — GUAIFENESIN 600 MG: 600 TABLET, EXTENDED RELEASE ORAL at 00:19

## 2018-09-06 RX ADMIN — GUAIFENESIN 600 MG: 600 TABLET, EXTENDED RELEASE ORAL at 21:24

## 2018-09-06 RX ADMIN — LISINOPRIL 10 MG: 10 TABLET ORAL at 09:40

## 2018-09-06 RX ADMIN — AMLODIPINE BESYLATE 5 MG: 5 TABLET ORAL at 09:40

## 2018-09-06 RX ADMIN — MULTIPLE VITAMINS W/ MINERALS TAB 1 TABLET: TAB at 09:40

## 2018-09-06 RX ADMIN — HYDROCHLOROTHIAZIDE 25 MG: 12.5 CAPSULE ORAL at 09:40

## 2018-09-06 RX ADMIN — ALBUTEROL SULFATE 2 PUFF: 90 AEROSOL, METERED RESPIRATORY (INHALATION) at 14:23

## 2018-09-06 RX ADMIN — ALBUTEROL SULFATE 2 PUFF: 90 AEROSOL, METERED RESPIRATORY (INHALATION) at 09:56

## 2018-09-06 RX ADMIN — METOPROLOL SUCCINATE 25 MG: 25 TABLET, FILM COATED, EXTENDED RELEASE ORAL at 09:41

## 2018-09-06 RX ADMIN — ALBUTEROL SULFATE 2 PUFF: 90 AEROSOL, METERED RESPIRATORY (INHALATION) at 00:35

## 2018-09-06 RX ADMIN — ALBUTEROL SULFATE 2 PUFF: 90 AEROSOL, METERED RESPIRATORY (INHALATION) at 21:25

## 2018-09-06 RX ADMIN — CYCLOBENZAPRINE HYDROCHLORIDE 5 MG: 5 TABLET, FILM COATED ORAL at 21:24

## 2018-09-06 RX ADMIN — PANTOPRAZOLE SODIUM 40 MG: 40 TABLET, DELAYED RELEASE ORAL at 05:58

## 2018-09-06 RX ADMIN — GABAPENTIN 300 MG: 300 CAPSULE ORAL at 09:40

## 2018-09-06 RX ADMIN — GABAPENTIN 300 MG: 300 CAPSULE ORAL at 21:24

## 2018-09-06 RX ADMIN — BUPRENORPHINE AND NALOXONE 1 TABLET: 8; 2 TABLET SUBLINGUAL at 09:42

## 2018-09-06 RX ADMIN — Medication 10 ML: at 09:41

## 2018-09-06 RX ADMIN — GABAPENTIN 300 MG: 300 CAPSULE ORAL at 14:11

## 2018-09-06 RX ADMIN — ACETAMINOPHEN 650 MG: 325 TABLET ORAL at 21:24

## 2018-09-06 RX ADMIN — ENOXAPARIN SODIUM 40 MG: 40 INJECTION SUBCUTANEOUS at 09:41

## 2018-09-06 RX ADMIN — FUROSEMIDE 40 MG: 10 INJECTION, SOLUTION INTRAMUSCULAR; INTRAVENOUS at 05:59

## 2018-09-06 ASSESSMENT — ENCOUNTER SYMPTOMS
WHEEZING: 1
VOMITING: 0
FACIAL SWELLING: 0
DIARRHEA: 0
VOICE CHANGE: 0
ABDOMINAL PAIN: 0
COUGH: 1
SHORTNESS OF BREATH: 1
NAUSEA: 0

## 2018-09-06 ASSESSMENT — PAIN SCALES - GENERAL
PAINLEVEL_OUTOF10: 4
PAINLEVEL_OUTOF10: 4
PAINLEVEL_OUTOF10: 1
PAINLEVEL_OUTOF10: 5
PAINLEVEL_OUTOF10: 3

## 2018-09-06 ASSESSMENT — PAIN DESCRIPTION - ORIENTATION: ORIENTATION: MID

## 2018-09-06 ASSESSMENT — PAIN DESCRIPTION - PAIN TYPE: TYPE: CHRONIC PAIN

## 2018-09-06 ASSESSMENT — PAIN DESCRIPTION - LOCATION: LOCATION: BACK

## 2018-09-06 ASSESSMENT — PAIN DESCRIPTION - PROGRESSION: CLINICAL_PROGRESSION: NOT CHANGED

## 2018-09-06 NOTE — PROGRESS NOTES
Physical Therapy    Facility/Department: [facility]MED SURG  Initial Assessment    NAME: Emi Daniels  : 1955  MRN: [id number]    Date of Service: 2018    Discharge Recommendations:  Home with assist PRN (help from family and neighbors in apartment building)        Patient Diagnosis(es): The primary encounter diagnosis was Acute on chronic congestive heart failure, unspecified heart failure type ([facility] [address] A diagnosis of Multifocal pneumonia was also pertinent to this visit. has a past medical history of Chronic rhinitis; Cigarette smoker; COPD (chronic obstructive pulmonary disease) ([facility] [facility] Depression; Dysphagia; GERD (gastroesophageal reflux disease); Hepatitis C, chronic ([facility] Hypertension; Migraine; Multiple transfusions; Osteoarthritis; Scoliosis; and Substance abuse.   has a past surgical history that includes  section; LEEP (); Upper gastrointestinal endoscopy (); Gastric bypass surgery; Appendectomy (2017); and laparoscopic appendectomy (N/A, 3/27/2017). Restrictions  Restrictions/Precautions  Restrictions/Precautions: Fall Risk, General Precautions  Required Braces or Orthoses?: No  Position Activity Restriction  Other position/activity restrictions: up as tolerated  Vision/Hearing  Vision: Impaired  Vision Exceptions: Wears glasses at all times  Hearing: Within functional limits     Subjective  General  Patient assessed for rehabilitation services?: Yes  Response To Previous Treatment: Not applicable  Family / Caregiver Present: No  Follows Commands: Within Functional Limits  Subjective  Subjective: Pt and RN agreeable to PT.  Pt alert at EOB eating upon arrival.  Pain Screening  Patient Currently in Pain: Denies  Vital Signs  Patient Currently in Pain: Denies  Pre Treatment Pain Screening  Intervention List: Patient able to continue with treatment    Orientation  Orientation  Overall Orientation Status: Within Functional Limits    Social/Functional 2  Surface - 2: level tile  Device 2: No device  Assistance 2: Stand by assistance  Quality of Gait 2: Grossly steady  Distance: 125'  Comments: Pt required one standing rest break ~15sec d/t SoB. States she gets SoB when walking and talking. Stairs/Curb  Stairs?: No     Balance  Posture: Good  Sitting - Static: Good  Sitting - Dynamic: Good  Standing - Static: Good  Standing - Dynamic: Good;-        Assessment   Assessment: Pt amb 15' CGA, 125' SBA, independent with transfers. Amb was grossly steady requiring ~15sec break during 125' amb d/t SoB from talking during amb. Pt would no longer benefit from acute PT. Prognosis: Good  Decision Making: Medium Complexity  No Skilled PT: At baseline function  REQUIRES PT FOLLOW UP: No  Activity Tolerance  Activity Tolerance: Patient Tolerated treatment well;Patient limited by endurance  Activity Tolerance: Pt reports having no concerns with safety and feeling at baseline with mobility. Plan   Safety Devices  Type of devices: Call light within reach, Gait belt, Left in bed  Restraints  Initially in place: No    G-Code  PT G-Codes  Functional Assessment Tool Used: [facility]  Score: 24/24  Functional Limitation: Mobility: Walking and moving around  Mobility: Walking and Moving Around Current Status (): 0 percent impaired, limited or restricted  Mobility: Walking and Moving Around Goal Status (): 0 percent impaired, limited or restricted  Mobility: Walking and Moving Around Discharge Status (): 0 percent impaired, limited or restricted    AM-PAC Score  AM-PAC Inpatient Mobility Raw Score : 24  AM-PAC Inpatient T-Scale Score : 61.14  Mobility Inpatient CMS 0-100% Score: 0  Mobility Inpatient CMS G-Code Modifier : CH          Therapy Time   Individual Concurrent Group Co-treatment   Time In 0920         Time Out 0940         Minutes 20               Physical therapy to sign off at this time.   Barber Cruz    This treatment/evaluation completed by signing SPT. Signing PT agrees with treatment and documentation.

## 2018-09-06 NOTE — PROGRESS NOTES
new glasses soon)  Hearing: Within functional limits    Orientation  Overall Orientation Status: Within Functional Limits     Balance  Sitting Balance: Independent  Standing Balance: Supervision  Standing Balance  Time: ~10 minutes  Activity: dynamic standing to fix sheets on bed; funct mob for household distances  Sit to stand: Supervision  Stand to sit: Independent    Functional Mobility  Functional - Mobility Device: No device  Activity: To/from bathroom; Other  Assist Level: Supervision  Functional Mobility Comments: Pt reports SOB when walking and talking at the same time. Pt reported SOB after funct mob    ADL  Feeding: Independent  Grooming: Independent; Increased time to complete  UE Bathing: Independent; Increased time to complete  LE Bathing: Independent; Increased time to complete  UE Dressing: Independent; Increased time to complete  LE Dressing: Independent; Increased time to complete (pt donned/doffed shoes sitting EOB)  Toileting: Independent    Tone RUE  RUE Tone: Normotonic  Tone LUE  LUE Tone: Normotonic    Coordination  Movements Are Fluid And Coordinated: No  Coordination and Movement description: Decreased speed     Bed mobility  Supine to Sit: Unable to assess  Sit to Supine: Unable to assess  Scooting: Independent  Comment: pt sat at EOB upon entrance/exit of writer  Transfers  Sit to stand: Supervision  Stand to sit:  Independent     Cognition  Overall Cognitive Status: WFL        Sensation  Overall Sensation Status: Impaired (pt reports N/T in toes that is not new since admission)    LUE AROM : WFL  RUE AROM : WFL  LUE Strength  Gross LUE Strength: WFL  L Hand Grasp: 5/5  L Hand Release: 5/5  RUE Strength  Gross RUE Strength: WFL  R Hand Grasp: 5/5  R Hand Release: 5/5     Assessment   Performance deficits / Impairments: Decreased endurance  Prognosis: Good  Decision Making: Low Complexity  Patient Education: OT POC, discharge recs, pursed lip breathing, safety awareness, EC/WS strategies, good return. No Skilled OT: Independent with functional mobility; Independent with ADL's;At baseline function; Safe to return home  REQUIRES OT FOLLOW UP: No  Activity Tolerance  Activity Tolerance: Patient Tolerated treatment well  Safety Devices  Safety Devices in place: Yes  Type of devices: Left in bed;Call light within reach;Nurse notified  Restraints  Initially in place: No         G-Code  OT G-codes  Functional Assessment Tool Used: [facility]  Score: 24/24  Functional Limitation: Self care  Self Care Current Status (): 0 percent impaired, limited or restricted  Self Care Goal Status (): 0 percent impaired, limited or restricted  Self Care Discharge Status (): 0 percent impaired, limited or restricted    How much help from another person does the pt currently need? Unable A Lot A Little None   1. Putting on and taking off regular lower body clothing? 1      2      3       4   2. Bathing (including washing, rinsing, drying)? 1      2      3      4   3. Toileting, which includes using toilet, bedpan, or urinal?      1      2        3      4   4. Putting on and taking off regular upper body clothing? 1      2      3       4   5. Taking care of personal grooming such as brushing teeth? 1      2      3      4   6. Eating meals? 1      2       3       4     1. Unable = Total/Dependent Assist  2. A Lot = Maximum/Moderate Assist  3. A Little = Minimum/Contact Guard Assist/Supervision  4.  None= Modified [place]/Independent    Raw Score Scale Score Scale Score Standard Error Approximate Degree of Functional Impairment     6 17.07 3.74 100%   7 20.13 3.68 92%   8 22.86 3.43 86%   9 25.33 3.17 80%   10 27.31 2.96 75%   11 29.04 2.79 70%   12 30.60 2.68 67%   13 32.03 2.62 63%   14 33.39 2.61 60%   15 34.69 2.65 56%   16 35.96 2.71 53%   17 37.26 2.82 50%   18 38.66 2.97 47%   19 40.22 3.20 43%   20 42.03 3.55 38%   21 44.27 4.08 33%   22 47.10 4.81 26%   23 51.12 5.88 16%

## 2018-09-06 NOTE — H&P
[facility]     Department of Internal Medicine - Staff Internal Medicine Service          ADMISSION NOTE/HISTORY AND PHYSICAL EXAMINATION   ______________________________________________________________________    HISTORY OBTAIN FROM:  patient    CHIEF COMPLAINT:  Leg swelling      HISTORY OF PRESENT ILLNESS:      The patient is a pleasant 70 y.o. female with significant past medical history of copd , hepatitis c, GERD presented with bilateral leg swelling from past few days. She states that she  first noticed swelling in her belly she is unable to recall the duration. Then she noticed swelling in her neck and face, now she has been noticing swelling in her feet and legs from past few  Days. She is also complaining of shortness of breath which is her baseline, wheezing. She is not complaining of shortness of breath on lying flat or PND. She states that her shortness of breath worsens on exertion. She is complaining of dry cough yesterday no she's complaining of productive cough which is greenish, thick non fowl smelling. She states that she got her water pill few days ago from her primary care physician for leg swelling. Patient states that she sometimes wakes up in the middle of the night because of choking sensation of chest.  She lives all alone so she hasn't noticed any snoring. She is not complaining of any fever, chills. PAST MEDICAL HISTORY:        Diagnosis Date    Chronic rhinitis     Cigarette smoker     COPD (chronic obstructive pulmonary disease) ([facility] [address] 8/5/2018    Depression     Dysphagia     GERD (gastroesophageal reflux disease)     Hepatitis C, chronic (HCC)     Hypertension     Migraine     Multiple transfusions     Osteoarthritis     hands    Scoliosis     Substance abuse     ivda and cocaine abuse in past     Patient has a past medical history of drug abuse she states that she she snorts Drugs. She never used needles for drug abuse.   PAST SURGICAL HISTORY:        Procedure Laterality Date    APPENDECTOMY  2017     SECTION      GASTRIC BYPASS SURGERY      LAPAROSCOPIC APPENDECTOMY N/A 3/27/2017    APPENDECTOMY LAPAROSCOPIC performed by Margret Andrade MD at [address] [place] [address]      hgsil    UPPER GASTROINTESTINAL ENDOSCOPY      oesophagitis, candidiasis       MEDICATION PRIOR TO ADMISSION:  Prescriptions Prior to Admission: hydrochlorothiazide (MICROZIDE) 12.5 MG capsule, Take 12.5 mg by mouth every morning  albuterol sulfate HFA (PROVENTIL HFA) 108 (90 Base) MCG/ACT inhaler, Inhale 1-2 puffs into the lungs every 4 hours as needed for Wheezing or Shortness of Breath (Space out to every 6 hours as symptoms improve)  acetaminophen (APAP EXTRA STRENGTH) 500 MG tablet, Take 2 tablets by mouth every 8 hours as needed for Pain  ipratropium-albuterol (DUONEB) 0.5-2.5 (3) MG/3ML SOLN nebulizer solution, Inhale 3 mLs into the lungs every 4 hours as needed for Shortness of Breath  gabapentin (NEURONTIN) 300 MG capsule, Take 1 capsule by mouth 3 times daily for 120 days. Junius El lisinopril (PRINIVIL;ZESTRIL) 10 MG tablet, Take 1 tablet by mouth daily  metoprolol succinate (TOPROL XL) 25 MG extended release tablet, Take 1 tablet by mouth daily  amLODIPine (NORVASC) 5 MG tablet, Take 1 tablet by mouth daily  Multiple Vitamins-Minerals (THERAPEUTIC MULTIVITAMIN-MINERALS) tablet, Take 1 tablet by mouth daily  cyclobenzaprine (FLEXERIL) 5 MG tablet, Take 5 mg by mouth 2 times daily as needed for Muscle spasms  SUBOXONE 8-2 MG FILM,     Allergies:  Aspirin; Keflet [cephalexin]; and Motrin [ibuprofen]    SOCIAL HISTORY:   TOBACCO:   reports that she has been smoking Cigarettes. She has a 40.00 pack-year smoking history. She has never used smokeless tobacco.   ETOH:   reports that she does not drink alcohol. DRUGS:  reports that she does not use drugs. OCCUPATION:  not on file    FAMILY HISTORY:   History reviewed.  No pertinent family

## 2018-09-06 NOTE — CARE COORDINATION
Case Management Initial Discharge Plan  Emi Daniels,         Readmission Risk              Risk of Unplanned Readmission:        33               Met with:patient to discuss discharge plans.    Information verified: address, contacts, phone number, , insurance Yes  PCP: Bruce Hinkle  Date of last visit: past month    Insurance Provider: Alexa    Discharge Planning    Living Arrangements:  Alone   Support Systems:  Children, Family Members    Home has 1 stories  0 stairs to climb to get into front door, 0stairs to climb to reach second floor has elevator  Location of bedroom/bathroom in home main    Patient able to perform ADL's:Independent    Current Services (outpatient & in home) kathy   DME equipment: none   DME provider: n/a    Pharmacy: [facility]   Potential Assistance Purchasing Medications:  No  Does patient want to participate in local refill/ meds to beds program?  Yes    Potential Assistance Needed:  N/A    Patient agreeable to home care: No  [place] of choice provided:  n/a    Prior SNF/Rehab Placement and Facility:   Agreeable to SNF/Rehab: No  [place] of choice provided: n/a   Evaluation: no    Expected Discharge date:  18  Patient expects to be discharged to:  home  Follow Up Appointment: Best Day/ Time: Monday AM    Transportation provider: medical cab/son  Transportation arrangements needed for discharge: yes    Discharge Plan: home independently        Electronically signed by Woo RN on 18 at 10:35 AM

## 2018-09-07 VITALS
TEMPERATURE: 98.6 F | SYSTOLIC BLOOD PRESSURE: 146 MMHG | WEIGHT: 128.53 LBS | DIASTOLIC BLOOD PRESSURE: 84 MMHG | OXYGEN SATURATION: 92 % | HEART RATE: 103 BPM | BODY MASS INDEX: 25.91 KG/M2 | HEIGHT: 59 IN | RESPIRATION RATE: 16 BRPM

## 2018-09-07 LAB
ANION GAP SERPL CALCULATED.3IONS-SCNC: 16 MMOL/L (ref 9–17)
BUN BLDV-MCNC: 20 MG/DL (ref 8–23)
BUN/CREAT BLD: ABNORMAL (ref 9–20)
CALCIUM SERPL-MCNC: 9.2 MG/DL (ref 8.6–10.4)
CHLORIDE BLD-SCNC: 99 MMOL/L (ref 98–107)
CO2: 24 MMOL/L (ref 20–31)
CREAT SERPL-MCNC: 0.69 MG/DL (ref 0.5–0.9)
GFR AFRICAN AMERICAN: >60 ML/MIN
GFR NON-AFRICAN AMERICAN: >60 ML/MIN
GFR SERPL CREATININE-BSD FRML MDRD: ABNORMAL ML/MIN/{1.73_M2}
GFR SERPL CREATININE-BSD FRML MDRD: ABNORMAL ML/MIN/{1.73_M2}
GLUCOSE BLD-MCNC: 131 MG/DL (ref 70–99)
POTASSIUM SERPL-SCNC: 4.7 MMOL/L (ref 3.7–5.3)
SODIUM BLD-SCNC: 139 MMOL/L (ref 135–144)

## 2018-09-07 PROCEDURE — 6370000000 HC RX 637 (ALT 250 FOR IP): Performed by: STUDENT IN AN ORGANIZED HEALTH CARE EDUCATION/TRAINING PROGRAM

## 2018-09-07 PROCEDURE — 6370000000 HC RX 637 (ALT 250 FOR IP): Performed by: EMERGENCY MEDICINE

## 2018-09-07 PROCEDURE — 6370000000 HC RX 637 (ALT 250 FOR IP): Performed by: INTERNAL MEDICINE

## 2018-09-07 PROCEDURE — 36415 COLL VENOUS BLD VENIPUNCTURE: CPT

## 2018-09-07 PROCEDURE — 2580000003 HC RX 258: Performed by: EMERGENCY MEDICINE

## 2018-09-07 PROCEDURE — 99238 HOSP IP/OBS DSCHRG MGMT 30/<: CPT | Performed by: INTERNAL MEDICINE

## 2018-09-07 PROCEDURE — 93306 TTE W/DOPPLER COMPLETE: CPT

## 2018-09-07 PROCEDURE — 2580000003 HC RX 258: Performed by: INTERNAL MEDICINE

## 2018-09-07 PROCEDURE — 94762 N-INVAS EAR/PLS OXIMTRY CONT: CPT

## 2018-09-07 PROCEDURE — 94640 AIRWAY INHALATION TREATMENT: CPT

## 2018-09-07 PROCEDURE — 6360000002 HC RX W HCPCS: Performed by: STUDENT IN AN ORGANIZED HEALTH CARE EDUCATION/TRAINING PROGRAM

## 2018-09-07 PROCEDURE — 80048 BASIC METABOLIC PNL TOTAL CA: CPT

## 2018-09-07 RX ORDER — LEVOFLOXACIN 500 MG/1
500 TABLET, FILM COATED ORAL DAILY
Qty: 5 TABLET | Refills: 0 | Status: ON HOLD | OUTPATIENT
Start: 2018-09-08 | End: 2018-09-13 | Stop reason: HOSPADM

## 2018-09-07 RX ORDER — CALCIUM CARBONATE 200(500)MG
500 TABLET,CHEWABLE ORAL
Status: COMPLETED | OUTPATIENT
Start: 2018-09-07 | End: 2018-09-07

## 2018-09-07 RX ORDER — GUAIFENESIN 600 MG/1
600 TABLET, EXTENDED RELEASE ORAL 2 TIMES DAILY
Qty: 20 TABLET | Refills: 0 | Status: ON HOLD | OUTPATIENT
Start: 2018-09-07 | End: 2018-09-13 | Stop reason: HOSPADM

## 2018-09-07 RX ADMIN — MULTIPLE VITAMINS W/ MINERALS TAB 1 TABLET: TAB at 08:15

## 2018-09-07 RX ADMIN — LEVOFLOXACIN 500 MG: 500 TABLET, FILM COATED ORAL at 08:16

## 2018-09-07 RX ADMIN — HYDROCHLOROTHIAZIDE 25 MG: 12.5 CAPSULE ORAL at 08:16

## 2018-09-07 RX ADMIN — PANTOPRAZOLE SODIUM 40 MG: 40 TABLET, DELAYED RELEASE ORAL at 07:02

## 2018-09-07 RX ADMIN — LISINOPRIL 10 MG: 10 TABLET ORAL at 08:16

## 2018-09-07 RX ADMIN — GABAPENTIN 300 MG: 300 CAPSULE ORAL at 13:57

## 2018-09-07 RX ADMIN — GUAIFENESIN 600 MG: 600 TABLET, EXTENDED RELEASE ORAL at 08:16

## 2018-09-07 RX ADMIN — ENOXAPARIN SODIUM 40 MG: 40 INJECTION SUBCUTANEOUS at 08:16

## 2018-09-07 RX ADMIN — IPRATROPIUM BROMIDE AND ALBUTEROL SULFATE 3 ML: .5; 3 SOLUTION RESPIRATORY (INHALATION) at 05:37

## 2018-09-07 RX ADMIN — METOPROLOL SUCCINATE 25 MG: 25 TABLET, FILM COATED, EXTENDED RELEASE ORAL at 08:15

## 2018-09-07 RX ADMIN — BUPRENORPHINE AND NALOXONE 1 TABLET: 8; 2 TABLET SUBLINGUAL at 08:16

## 2018-09-07 RX ADMIN — ACETAMINOPHEN 325 MG: 325 TABLET ORAL at 07:06

## 2018-09-07 RX ADMIN — GABAPENTIN 300 MG: 300 CAPSULE ORAL at 08:16

## 2018-09-07 RX ADMIN — Medication 10 ML: at 08:16

## 2018-09-07 RX ADMIN — ALBUTEROL SULFATE 2 PUFF: 90 AEROSOL, METERED RESPIRATORY (INHALATION) at 09:10

## 2018-09-07 RX ADMIN — ACETAMINOPHEN 350 MG: 325 TABLET ORAL at 07:02

## 2018-09-07 RX ADMIN — Medication 10 ML: at 08:17

## 2018-09-07 RX ADMIN — ALBUTEROL SULFATE 2 PUFF: 90 AEROSOL, METERED RESPIRATORY (INHALATION) at 13:34

## 2018-09-07 RX ADMIN — ANTACID TABLETS 500 MG: 500 TABLET, CHEWABLE ORAL at 02:16

## 2018-09-07 ASSESSMENT — PAIN SCALES - GENERAL
PAINLEVEL_OUTOF10: 5
PAINLEVEL_OUTOF10: 5
PAINLEVEL_OUTOF10: 4
PAINLEVEL_OUTOF10: 4

## 2018-09-07 ASSESSMENT — PAIN DESCRIPTION - LOCATION: LOCATION: CHEST

## 2018-09-07 NOTE — PROGRESS NOTES
[facility]  Internal Medicine Residency Program  Inpatient Daily Progress Note  ______________________________________________________________________________    Patient: Emily Devlin  YOB: 1955   MRN: [id number]    Acct: [de-identified]     Admit date: 9/5/2018  Today's date: 09/07/18  Number of days in the hospital: 2  Expected Discharge Date: 09/07/18    Admitting Diagnosis: Pneumonia due to Streptococcus pneumoniae [facility])    Subjective:   Pt seen and Chart reviewed.   No overnight events  Echo normal       Objective:   Vital Sign:  BP (!) 146/84   Pulse 103   Temp 98.6 °F (37 °C) (Oral)   Resp 16   Ht 4' 11\" (1.499 m)   Wt 128 lb 8.5 oz (58.3 kg)   LMP 03/18/2015   SpO2 92%   BMI 25.96 kg/m²       Physical Exam:  General appearance:   alert, well appearing, and in no distress  Mental Status: alert, oriented to person, place, and time  Neurologic:  alert, oriented, normal speech, no focal findings or movement disorder noted  Lungs:  Bronchial breath sounds  In left lower lobe auscultation, scattered wheezes, rales or rhonchi, decreased air entry  Heart[de-identified] normal rate, regular rhythm, normal S1, S2, no murmurs, rubs, clicks or gallops  Abdomen:  soft, nontender, nondistended, no masses or organomegaly  Extremities: peripheral pulses normal, no pedal edema, no clubbing or cyanosis   Skin: normal coloration and turgor, no rashes, no suspicious skin lesions noted    Medications:  Scheduled Medications   albuterol sulfate HFA  2 puff Inhalation 4x Daily    levofloxacin  500 mg Oral Daily    sodium chloride flush  10 mL Intravenous 2 times per day    metoprolol succinate  25 mg Oral Daily    sodium chloride flush  10 mL Intravenous 2 times per day    gabapentin  300 mg Oral TID    lisinopril  10 mg Oral Daily    therapeutic multivitamin-minerals  1 tablet Oral Daily    buprenorphine-naloxone  1 tablet Sublingual Daily    enoxaparin  40 mg Subcutaneous Daily    guaiFENesin  600 mg Oral BID    hydrochlorothiazide  25 mg Oral QAM    pantoprazole  40 mg Oral QAM AC       PRN Medications    sodium chloride flush 10 mL PRN   sodium chloride flush 10 mL PRN   acetaminophen 650 mg Q8H PRN   cyclobenzaprine 5 mg BID PRN   ipratropium-albuterol 3 mL Q4H PRN   sodium chloride flush 10 mL PRN   magnesium hydroxide 30 mL Daily PRN   ondansetron 4 mg Q6H PRN   potassium chloride 40 mEq PRN   Or     potassium chloride 40 mEq PRN   Or     potassium chloride 10 mEq PRN   potassium chloride 10 mEq PRN   magnesium sulfate 1 g PRN       Diagnostic Labs and Imaging:  CBC:  Recent Labs      09/05/18   1126  09/06/18   0616   WBC  15.7*  16.1*   HGB  11.6*  12.6   PLT  486*  499*     BMP:   Recent Labs      09/05/18   1126  09/06/18   0616  09/07/18   0638   NA  144  140  139   K  4.3  4.6  4.7   CL  107  103  99   CO2  23  24  24   BUN  19  17  20   CREATININE  0.68  0.71  0.69   GLUCOSE  105*  106*  131*     Hepatic:   Recent Labs      09/05/18   1126   AST  15   ALT  11   BILITOT  0.39   ALKPHOS  46      USG of abdomen :     Impression:     No ascites identified sonographically. Assessment and Plan:   1. Atypical pneumonia. Levofloxacin 500 MG daily. 2. Lower extremity edema may be multifactorial including Norvasc and venous insufficiency. 3. Essential hypertension. On hydrochlorothiazide to 25 mg daily. Continue lisinopril 10 mg daily and Lopressor Metoprolol 25 MG BD.    4. Macrocytic anemia:  serum folate and B12 serum with serum methylmalonic acid   5. COPD : Albuterol QID. Spiriva AND duoneb on discharge .     Okay for discharge     Concha Harris MD      Department of Internal Medicine  [facility]         9/7/2018, 1:55 PM      Attending Physician Statement  I have discussed the case, including pertinent history and exam findings with the resident and the team.  I have seen and examined the patient and the key

## 2018-09-07 NOTE — PROGRESS NOTES
Patient given all discharge instructions and updated on her scripts being down in the pharmacy. Patient states she isn't waiting on her scripts and is going to the outpatient pharmacy to get them herself. Patients IV discontinued and patient showed where the outpatient pharmacy is located.

## 2018-09-12 ENCOUNTER — HOSPITAL ENCOUNTER (INPATIENT)
Age: 63
LOS: 2 days | Discharge: HOME | DRG: 243 | End: 2018-09-14
Attending: EMERGENCY MEDICINE | Admitting: INTERNAL MEDICINE
Payer: MEDICARE

## 2018-09-12 ENCOUNTER — APPOINTMENT (OUTPATIENT)
Dept: GENERAL RADIOLOGY | Age: 63
DRG: 243 | End: 2018-09-12
Payer: MEDICARE

## 2018-09-12 ENCOUNTER — TELEPHONE (OUTPATIENT)
Dept: OTHER | Facility: CLINIC | Age: 63
End: 2018-09-12

## 2018-09-12 DIAGNOSIS — R06.02 SHORTNESS OF BREATH: ICD-10-CM

## 2018-09-12 DIAGNOSIS — R07.9 CHEST PAIN, UNSPECIFIED TYPE: Primary | ICD-10-CM

## 2018-09-12 LAB
ALBUMIN SERPL-MCNC: 3.4 G/DL (ref 3.5–5.2)
ALBUMIN/GLOBULIN RATIO: 1.1 (ref 1–2.5)
ALP BLD-CCNC: 44 U/L (ref 35–104)
ALT SERPL-CCNC: 17 U/L (ref 5–33)
ANION GAP SERPL CALCULATED.3IONS-SCNC: 13 MMOL/L (ref 9–17)
AST SERPL-CCNC: 20 U/L
BILIRUB SERPL-MCNC: 0.31 MG/DL (ref 0.3–1.2)
BILIRUBIN DIRECT: 0.15 MG/DL
BILIRUBIN, INDIRECT: 0.16 MG/DL (ref 0–1)
BNP INTERPRETATION: NORMAL
BUN BLDV-MCNC: 15 MG/DL (ref 8–23)
BUN/CREAT BLD: ABNORMAL (ref 9–20)
CALCIUM SERPL-MCNC: 8.6 MG/DL (ref 8.6–10.4)
CHLORIDE BLD-SCNC: 98 MMOL/L (ref 98–107)
CO2: 35 MMOL/L (ref 20–31)
CREAT SERPL-MCNC: 0.85 MG/DL (ref 0.5–0.9)
EKG ATRIAL RATE: 101 BPM
EKG ATRIAL RATE: 87 BPM
EKG P AXIS: 68 DEGREES
EKG P AXIS: 70 DEGREES
EKG P-R INTERVAL: 148 MS
EKG P-R INTERVAL: 160 MS
EKG Q-T INTERVAL: 328 MS
EKG Q-T INTERVAL: 362 MS
EKG QRS DURATION: 70 MS
EKG QRS DURATION: 70 MS
EKG QTC CALCULATION (BAZETT): 394 MS
EKG QTC CALCULATION (BAZETT): 469 MS
EKG R AXIS: 45 DEGREES
EKG R AXIS: 47 DEGREES
EKG T AXIS: 32 DEGREES
EKG T AXIS: 51 DEGREES
EKG VENTRICULAR RATE: 101 BPM
EKG VENTRICULAR RATE: 87 BPM
GFR AFRICAN AMERICAN: >60 ML/MIN
GFR NON-AFRICAN AMERICAN: >60 ML/MIN
GFR SERPL CREATININE-BSD FRML MDRD: ABNORMAL ML/MIN/{1.73_M2}
GFR SERPL CREATININE-BSD FRML MDRD: ABNORMAL ML/MIN/{1.73_M2}
GLOBULIN: ABNORMAL G/DL (ref 1.5–3.8)
GLUCOSE BLD-MCNC: 221 MG/DL (ref 70–99)
HCT VFR BLD CALC: 38.4 % (ref 36.3–47.1)
HEMOGLOBIN: 11.6 G/DL (ref 11.9–15.1)
MCH RBC QN AUTO: 32.9 PG (ref 25.2–33.5)
MCHC RBC AUTO-ENTMCNC: 30.2 G/DL (ref 28.4–34.8)
MCV RBC AUTO: 108.8 FL (ref 82.6–102.9)
NRBC AUTOMATED: 0.9 PER 100 WBC
PDW BLD-RTO: 14.8 % (ref 11.8–14.4)
PLATELET # BLD: 557 K/UL (ref 138–453)
PMV BLD AUTO: 8.7 FL (ref 8.1–13.5)
POC TROPONIN I: 0.01 NG/ML (ref 0–0.1)
POC TROPONIN I: 0.01 NG/ML (ref 0–0.1)
POC TROPONIN INTERP: NORMAL
POC TROPONIN INTERP: NORMAL
POTASSIUM SERPL-SCNC: 3.7 MMOL/L (ref 3.7–5.3)
PRO-BNP: 96 PG/ML
RBC # BLD: 3.53 M/UL (ref 3.95–5.11)
SODIUM BLD-SCNC: 146 MMOL/L (ref 135–144)
TOTAL PROTEIN: 6.5 G/DL (ref 6.4–8.3)
TROPONIN INTERP: NORMAL
TROPONIN T: <0.03 NG/ML
WBC # BLD: 11.8 K/UL (ref 3.5–11.3)

## 2018-09-12 PROCEDURE — 99222 1ST HOSP IP/OBS MODERATE 55: CPT | Performed by: INTERNAL MEDICINE

## 2018-09-12 PROCEDURE — 83880 ASSAY OF NATRIURETIC PEPTIDE: CPT

## 2018-09-12 PROCEDURE — 1200000000 HC SEMI PRIVATE

## 2018-09-12 PROCEDURE — 71046 X-RAY EXAM CHEST 2 VIEWS: CPT

## 2018-09-12 PROCEDURE — 80076 HEPATIC FUNCTION PANEL: CPT

## 2018-09-12 PROCEDURE — G0378 HOSPITAL OBSERVATION PER HR: HCPCS

## 2018-09-12 PROCEDURE — 6360000002 HC RX W HCPCS: Performed by: EMERGENCY MEDICINE

## 2018-09-12 PROCEDURE — 85027 COMPLETE CBC AUTOMATED: CPT

## 2018-09-12 PROCEDURE — 99285 EMERGENCY DEPT VISIT HI MDM: CPT

## 2018-09-12 PROCEDURE — 2580000003 HC RX 258: Performed by: STUDENT IN AN ORGANIZED HEALTH CARE EDUCATION/TRAINING PROGRAM

## 2018-09-12 PROCEDURE — 6370000000 HC RX 637 (ALT 250 FOR IP): Performed by: STUDENT IN AN ORGANIZED HEALTH CARE EDUCATION/TRAINING PROGRAM

## 2018-09-12 PROCEDURE — 6360000002 HC RX W HCPCS: Performed by: STUDENT IN AN ORGANIZED HEALTH CARE EDUCATION/TRAINING PROGRAM

## 2018-09-12 PROCEDURE — 94640 AIRWAY INHALATION TREATMENT: CPT

## 2018-09-12 PROCEDURE — 93005 ELECTROCARDIOGRAM TRACING: CPT

## 2018-09-12 PROCEDURE — 80048 BASIC METABOLIC PNL TOTAL CA: CPT

## 2018-09-12 PROCEDURE — 36415 COLL VENOUS BLD VENIPUNCTURE: CPT

## 2018-09-12 PROCEDURE — 84484 ASSAY OF TROPONIN QUANT: CPT

## 2018-09-12 RX ORDER — ALBUTEROL SULFATE 2.5 MG/3ML
2.5 SOLUTION RESPIRATORY (INHALATION)
Status: DISCONTINUED | OUTPATIENT
Start: 2018-09-12 | End: 2018-09-13

## 2018-09-12 RX ORDER — PANTOPRAZOLE SODIUM 40 MG/1
40 TABLET, DELAYED RELEASE ORAL
Status: DISCONTINUED | OUTPATIENT
Start: 2018-09-13 | End: 2018-09-14 | Stop reason: HOSPADM

## 2018-09-12 RX ORDER — SODIUM CHLORIDE 0.9 % (FLUSH) 0.9 %
10 SYRINGE (ML) INJECTION EVERY 12 HOURS SCHEDULED
Status: DISCONTINUED | OUTPATIENT
Start: 2018-09-12 | End: 2018-09-14 | Stop reason: HOSPADM

## 2018-09-12 RX ORDER — IPRATROPIUM BROMIDE AND ALBUTEROL SULFATE 2.5; .5 MG/3ML; MG/3ML
1 SOLUTION RESPIRATORY (INHALATION)
Status: DISCONTINUED | OUTPATIENT
Start: 2018-09-12 | End: 2018-09-12

## 2018-09-12 RX ORDER — ALBUTEROL SULFATE 90 UG/1
2 AEROSOL, METERED RESPIRATORY (INHALATION) EVERY 4 HOURS PRN
Status: DISCONTINUED | OUTPATIENT
Start: 2018-09-12 | End: 2018-09-12

## 2018-09-12 RX ORDER — ALBUTEROL SULFATE 2.5 MG/3ML
5 SOLUTION RESPIRATORY (INHALATION)
Status: DISCONTINUED | OUTPATIENT
Start: 2018-09-12 | End: 2018-09-12

## 2018-09-12 RX ORDER — SODIUM CHLORIDE 0.9 % (FLUSH) 0.9 %
10 SYRINGE (ML) INJECTION PRN
Status: DISCONTINUED | OUTPATIENT
Start: 2018-09-12 | End: 2018-09-14 | Stop reason: HOSPADM

## 2018-09-12 RX ORDER — BUPRENORPHINE HYDROCHLORIDE AND NALOXONE HYDROCHLORIDE DIHYDRATE 8; 2 MG/1; MG/1
1 TABLET SUBLINGUAL ONCE
Status: DISCONTINUED | OUTPATIENT
Start: 2018-09-12 | End: 2018-09-14 | Stop reason: HOSPADM

## 2018-09-12 RX ORDER — ONDANSETRON 2 MG/ML
4 INJECTION INTRAMUSCULAR; INTRAVENOUS EVERY 6 HOURS PRN
Status: DISCONTINUED | OUTPATIENT
Start: 2018-09-12 | End: 2018-09-14 | Stop reason: HOSPADM

## 2018-09-12 RX ORDER — GABAPENTIN 300 MG/1
300 CAPSULE ORAL 3 TIMES DAILY
Status: DISCONTINUED | OUTPATIENT
Start: 2018-09-12 | End: 2018-09-14 | Stop reason: HOSPADM

## 2018-09-12 RX ORDER — METOPROLOL SUCCINATE 25 MG/1
25 TABLET, EXTENDED RELEASE ORAL DAILY
Status: DISCONTINUED | OUTPATIENT
Start: 2018-09-13 | End: 2018-09-13

## 2018-09-12 RX ORDER — GUAIFENESIN 600 MG/1
600 TABLET, EXTENDED RELEASE ORAL 2 TIMES DAILY
Status: DISCONTINUED | OUTPATIENT
Start: 2018-09-12 | End: 2018-09-14 | Stop reason: HOSPADM

## 2018-09-12 RX ORDER — BUPRENORPHINE HYDROCHLORIDE AND NALOXONE HYDROCHLORIDE DIHYDRATE 8; 2 MG/1; MG/1
1 TABLET SUBLINGUAL ONCE
Status: COMPLETED | OUTPATIENT
Start: 2018-09-12 | End: 2018-09-12

## 2018-09-12 RX ORDER — ALBUTEROL SULFATE 90 UG/1
2 AEROSOL, METERED RESPIRATORY (INHALATION)
Status: DISCONTINUED | OUTPATIENT
Start: 2018-09-12 | End: 2018-09-12

## 2018-09-12 RX ORDER — IPRATROPIUM BROMIDE AND ALBUTEROL SULFATE 2.5; .5 MG/3ML; MG/3ML
3 SOLUTION RESPIRATORY (INHALATION) EVERY 4 HOURS PRN
Status: DISCONTINUED | OUTPATIENT
Start: 2018-09-12 | End: 2018-09-12

## 2018-09-12 RX ORDER — HYDROCHLOROTHIAZIDE 12.5 MG/1
12.5 CAPSULE, GELATIN COATED ORAL EVERY MORNING
Status: DISCONTINUED | OUTPATIENT
Start: 2018-09-13 | End: 2018-09-14 | Stop reason: HOSPADM

## 2018-09-12 RX ORDER — ALBUTEROL SULFATE 2.5 MG/3ML
2.5 SOLUTION RESPIRATORY (INHALATION)
Status: DISCONTINUED | OUTPATIENT
Start: 2018-09-12 | End: 2018-09-14 | Stop reason: HOSPADM

## 2018-09-12 RX ADMIN — GABAPENTIN 300 MG: 300 CAPSULE ORAL at 18:40

## 2018-09-12 RX ADMIN — ALBUTEROL SULFATE 2.5 MG: 2.5 SOLUTION RESPIRATORY (INHALATION) at 22:15

## 2018-09-12 RX ADMIN — ALBUTEROL SULFATE 5 MG: 5 SOLUTION RESPIRATORY (INHALATION) at 12:32

## 2018-09-12 RX ADMIN — Medication 10 ML: at 22:09

## 2018-09-12 RX ADMIN — GABAPENTIN 300 MG: 300 CAPSULE ORAL at 22:08

## 2018-09-12 RX ADMIN — GUAIFENESIN 600 MG: 600 TABLET, EXTENDED RELEASE ORAL at 22:08

## 2018-09-12 RX ADMIN — BUPRENORPHINE AND NALOXONE 1 TABLET: 8; 2 TABLET SUBLINGUAL at 19:14

## 2018-09-12 ASSESSMENT — ENCOUNTER SYMPTOMS
VOMITING: 0
CONSTIPATION: 0
SHORTNESS OF BREATH: 1
SORE THROAT: 0
COUGH: 1
NAUSEA: 0
ABDOMINAL DISTENTION: 0
RHINORRHEA: 0
DIARRHEA: 0
WHEEZING: 0

## 2018-09-12 ASSESSMENT — PAIN SCALES - GENERAL: PAINLEVEL_OUTOF10: 8

## 2018-09-12 ASSESSMENT — PAIN DESCRIPTION - FREQUENCY: FREQUENCY: CONTINUOUS

## 2018-09-12 NOTE — H&P
Mannie Vargas MD at [address] [place] [address]  2002    hgsil    UPPER GASTROINTESTINAL ENDOSCOPY  2008    oesophagitis, candidiasis       MEDICATION PRIOR TO ADMISSION:  Not in a hospital admission. Allergies:  Aspirin; Keflet [cephalexin]; and Motrin [ibuprofen]    SOCIAL HISTORY:   TOBACCO:   reports that she has been smoking Cigarettes. She has a 40.00 pack-year smoking history. She has never used smokeless tobacco.  ETOH:   reports that she does not drink alcohol. DRUGS:   reports that she does not use drugs. FAMILY HISTORY:   History reviewed. No pertinent family history. REVIEW OF SYSTEMS:  CONSTITUTIONAL:  negative  EYES:  negative  HEENT:  negative  RESPIRATORY:  negative  CARDIOVASCULAR:  negative  GASTROINTESTINAL:  negative  HEMATOLOGIC/LYMPHATIC:  negative  ALLERGIC/IMMUNOLOGIC:  negative  ENDOCRINE:  negative  MUSCULOSKELETAL:  negative  NEUROLOGICAL:  negative    PHYSICAL EXAM:  /69   Pulse 97   Temp 98.8 °F (37.1 °C) (Oral)   Resp 15   Ht 4' 11\" (1.499 m)   Wt 127 lb (57.6 kg)   LMP 03/18/2015   SpO2 92%   BMI 25.65 kg/m²   CONSTITUTIONAL:  awake, alert, cooperative, no apparent distress, and appears stated age  EYES:  Lids and lashes normal, pupils equal, round and reactive to light, extra ocular muscles intact, sclera clear, conjunctiva normal  ENT:  Normocephalic, without obvious abnormality, atraumatic, sinuses nontender on palpation, external ears without lesions, oral pharynx with moist mucus membranes, tonsils without erythema or exudates, gums normal and good dentition.   NECK:  Supple, symmetrical, trachea midline, no adenopathy, thyroid symmetric, not enlarged and no tenderness, skin normal  HEMATOLOGIC/LYMPHATICS:  no cervical lymphadenopathy and no supraclavicular lymphadenopathy  BACK:  Symmetric, no curvature, spinous processes are non-tender on palpation, paraspinous muscles are non-tender on palpation, no costal vertebral tenderness  LUNGS:  No increased work of breathing, good air exchange, B/L rhonchi noted. CARDIOVASCULAR:  Normal apical impulse, regular rate and rhythm, normal S1 and S2, no S3 or S4, and no murmur noted  ABDOMEN:  No scars, normal bowel sounds, soft, non-distended, non-tender, no masses palpated, no hepatosplenomegally  MUSCULOSKELETAL:  There is no redness, warmth, or swelling of the joints. Full range of motion noted. Motor strength is 5 out of 5 all extremities bilaterally. Tone is normal.  NEUROLOGIC:  Awake, alert, oriented to name, place and time. Cranial nerves II-XII are grossly intact. Motor is 5 out of 5 bilaterally. Cerebellar finger to nose, heel to shin intact. Sensory is intact. Babinski down going, Romberg negative, and gait is normal.  SKIN:  no bruising or bleeding and normal skin color, texture, turgor    IMPRESSION :     This is a 70 y.o. female who presented with chest pain and wheezing and found to have B/L rhonchi on exam likely due to inability to clear secretions. Patient requires placement admit to inpatient status med/surg because of concern for COPD exacerbation. ASSESSMENT/PLAN:    1. Chest pain : likely secondary to GERD, unlike to be of cardiac origin : CXR : normal ; troponin x2, EKG; Protonix   2. Increased bronchial secretions : Acapella, continue Mucinex. 3. COPD : not in exacerbation : continue albuterol, duoneb, dulera  4. Hypertension : monitor BP, restart HCTZ 12.5 mg OD , Toprol XL 25  Mg OD. 5. Restart Sub oxone , once dose confirmed by Pharmacy. 6. DVT prophylaxis : Lovenox   7. Discharge planning : case management. Leticia Danielson MD      Department of Internal Medicine  [facility]         9/12/2018, 3:40 PM      Attending Physician Statement  I have discussed the case, including pertinent history and exam findings with the resident and the team.  I have seen and examined the patient and the key elements of the encounter have been performed by me.   I agree with the assessment, plan and orders as documented by the resident. Admitted with noncardiac chest pain. Workup has been negative.   Done last month was normal.  We'll observe patient today and discharge in the morning if patient is able to tolerate oral.     Jenelle Garzon MD, ADDIE, Estefania Padilla  Attending Physician, Internal Medicine Service    Internal Medicine Residency Program  9/12/2018, 4:46 PM

## 2018-09-12 NOTE — ED NOTES
Report attempted, RN on phone taking report for another ER pt. Will call back in 20 min. Pt in NIRAV Her Began, RN  09/12/18 8412

## 2018-09-12 NOTE — ED NOTES
Bed: 17  Expected date:   Expected time:   Means of arrival:   Comments:     [address], RN  09/12/18 1110

## 2018-09-12 NOTE — ED PROVIDER NOTES
Ct Chest Pulmonary Embolism W Contrast    Result Date: 9/5/2018  EXAMINATION: CTA OF THE CHEST 9/5/2018 1:42 pm TECHNIQUE: CTA of the chest was performed after the administration of intravenous contrast.  Multiplanar reformatted images are provided for review. MIP images are provided for review. Dose modulation, iterative reconstruction, and/or weight based adjustment of the mA/kV was utilized to reduce the radiation dose to as low as reasonably achievable. COMPARISON: 08/11/2018 HISTORY: ORDERING SYSTEM PROVIDED HISTORY: r/o PE vs PNA - светлана, Eulalio Watkins FINDINGS: Pulmonary Arteries: Pulmonary arteries are adequately opacified for evaluation. No evidence of intraluminal filling defect to suggest pulmonary embolism. Main pulmonary artery is normal in caliber. Mediastinum: No evidence of mediastinal lymphadenopathy. The heart and pericardium demonstrate no acute abnormality. There is no acute abnormality of the thoracic aorta. Lungs/pleura: Previously described ground-glass infiltrates in the upper lobes bilaterally persist and appear to be more extensive. More focal consolidation seen in the right upper lobe inferiorly. New airspace opacification seen in the lingula and the right middle lobe. Scarring seen at the left lung base. No pleural effusion. No endobronchial lesion. Upper Abdomen: Limited images of the upper abdomen are unremarkable. Soft Tissues/Bones: No acute bone or soft tissue abnormality. 1. Negative for acute pulmonary embolism 2. More extensive ground-glass opacification noted in the upper lobes bilaterally with new airspace opacifications seen in the lingula of the left upper lobe and in the right middle lobe. Appearance suggests a multifocal pneumonia.  3. Stable scarring in the left lower lobe     Us Abdomen Limited    Result Date: 9/6/2018  EXAMINATION: 4 QUADRANT ULTRASOUND 9/6/2018 8:31 am COMPARISON: CT scan of the abdomen and pelvis from 03/26/2018 HISTORY: [address] Mar PROVIDED HISTORY: abdominal distension - r/o ascitis TECHNOLOGIST PROVIDED HISTORY: abdominal distension - r/o ascitis FINDINGS: OTHER: No evidence of significant quadrant ascites. No ascites identified sonographically. RECENT VITALS:     Temp: 98.8 °F (37.1 °C),  Pulse: 97, Resp: 15, BP: 137/69, SpO2: 92 %    This patient is a 70 y.o. Female with chest pain with history of multifocal pneumonia. Was recently discharged. Troponin is normal, EKG unchanged. Readmitted to internal medicine for aggressive pulmonary toilet, pulmonary consultation. OUTSTANDING TASKS / RECOMMENDATIONS:    1. Admitted to IM  2. Pending transfer to floor     FINAL IMPRESSION:     1. Chest pain, unspecified type    2.  Shortness of breath        DISPOSITION:         DISPOSITION:  []  Discharge   []  Transfer -    [x]  Admission -   IM   []  Against Medical Advice   []  Eloped   FOLLOW-UP: Aydee Rosado  [address]  [phone]           DISCHARGE MEDICATIONS: New Prescriptions    No medications on file           [facility]  Emergency Medicine Resident  [place], [place]  Resident  09/13/18 Baljinder Mckeon

## 2018-09-12 NOTE — ED NOTES
2nd trop drawn and running. Pt resting in bed w/ no complaints, rr even and unlabored. Will continue to monitor.      Mainor Alcantara RN  09/12/18 0465

## 2018-09-12 NOTE — ED PROVIDER NOTES
history. Allergies:  Aspirin; Keflet [cephalexin]; and Motrin [ibuprofen]    Home Medications:  Prior to Admission medications    Medication Sig Start Date End Date Taking? Authorizing Provider   guaiFENesin (MUCINEX) 600 MG extended release tablet Take 1 tablet by mouth 2 times daily for 10 days 9/7/18 9/17/18  Nicole Perez MD   levofloxacin (LEVAQUIN) 500 MG tablet Take 1 tablet by mouth daily for 5 days 9/8/18 9/13/18  Nicole Perez MD   hydrochlorothiazide (MICROZIDE) 12.5 MG capsule Take 12.5 mg by mouth every morning    Historical Provider, MD   albuterol sulfate HFA (PROVENTIL HFA) 108 (90 Base) MCG/ACT inhaler Inhale 1-2 puffs into the lungs every 4 hours as needed for Wheezing or Shortness of Breath (Space out to every 6 hours as symptoms improve) 8/31/18   JAYDON Fitzpatrick - CNP   acetaminophen (APAP EXTRA STRENGTH) 500 MG tablet Take 2 tablets by mouth every 8 hours as needed for Pain 8/12/18   Nicci Tiwari MD   ipratropium-albuterol (DUONEB) 0.5-2.5 (3) MG/3ML SOLN nebulizer solution Inhale 3 mLs into the lungs every 4 hours as needed for Shortness of Breath 8/12/18   Nicci Tiwari MD   gabapentin (NEURONTIN) 300 MG capsule Take 1 capsule by mouth 3 times daily for 120 days. . 8/12/18 12/10/18  Nicci Tiwari MD   lisinopril (PRINIVIL;ZESTRIL) 10 MG tablet Take 1 tablet by mouth daily 8/12/18   Nicci Tiwari MD   metoprolol succinate (TOPROL XL) 25 MG extended release tablet Take 1 tablet by mouth daily 8/12/18   Nicci Tiwari MD   Multiple Vitamins-Minerals (THERAPEUTIC MULTIVITAMIN-MINERALS) tablet Take 1 tablet by mouth daily 8/12/18   Nicci Tiwari MD   cyclobenzaprine (FLEXERIL) 5 MG tablet Take 5 mg by mouth 2 times daily as needed for Muscle spasms    Historical Provider, MD   SUBOXONE 8-2 MG FILM  1/19/17   Historical Provider, MD       REVIEW OF SYSTEMS    (2-9 systems for level 4, 10 or more for level 5)      Review of Systems   Constitutional: Negative for activity change, appetite change, fatigue and fever. HENT: Negative for congestion, rhinorrhea and sore throat. Respiratory: Positive for cough and shortness of breath. Negative for wheezing. Cardiovascular: Positive for chest pain. Negative for palpitations and leg swelling. Gastrointestinal: Negative for abdominal distention, constipation, diarrhea, nausea and vomiting. Genitourinary: Negative for decreased urine volume and dysuria. Skin: Negative for rash. Neurological: Negative for dizziness, weakness, light-headedness, numbness and headaches. PHYSICAL EXAM   (up to 7 for level 4, 8 or more for level 5)      INITIAL VITALS:   /69   Pulse 97   Temp 98.8 °F (37.1 °C) (Oral)   Resp 15   Ht 4' 11\" (1.499 m)   Wt 127 lb (57.6 kg)   LMP 03/18/2015   SpO2 92%   BMI 25.65 kg/m²     Physical Exam   Constitutional: She is oriented to person, place, and time. Patient is speaking in full sentences in no respiratory distress. HENT:   Head: Normocephalic and atraumatic. Eyes: Conjunctivae are normal. Right eye exhibits no discharge. Left eye exhibits no discharge. Cardiovascular: Normal rate, regular rhythm and normal heart sounds. Exam reveals no gallop and no friction rub. No murmur heard. Pulmonary/Chest: No respiratory distress. She has wheezes. She has no rales. She exhibits no tenderness. Patient does have rhonchi noted more prominent in the left upper lobe, no accessory muscle usage, additional rhonchi present in the right lower lobe. Abdominal: Soft. She exhibits no distension and no mass. There is no tenderness. There is no rebound and no guarding. Neurological: She is alert and oriented to person, place, and time. Skin: Skin is warm and dry. No rash noted. Nursing note and vitals reviewed. DIFFERENTIAL  DIAGNOSIS       Patient was slightly tachycardic upon entry, diffusely rhonchorous.   Does have a history of smoking, and recent hospitalization was concern for possible PE however patient does have a recent PE study that was done 7 days ago. Will not repeat at this time. Patient is not complaining of leg pains or calf cramping or calf pains no lower family swelling. Her cardiac workup. She does have a negative stress test that was reviewed from August 6, 2018. We'll treat rhonchi, do breathing treatments, additional steroids, and continue to monitor    PLAN (LABS / IMAGING / EKG):  Orders Placed This Encounter   Procedures    XR CHEST STANDARD (2 VW)    CBC    BASIC METABOLIC PANEL    Brain Natriuretic Peptide    HEPATIC FUNCTION PANEL    Cardiac output monitoring    Telemetry monitoring    Inpatient consult to Internal Medicine    Initiate ED Aerosol Protocol    Respiratory care evaluation only    HHN Treatment    HHN Treatment    HHN Treatment    MDI Treatment    MDI Treatment    HHN Treatment    POCT troponin    POCT troponin    POCT troponin    Insert peripheral IV    PATIENT STATUS (FROM ED OR OR/PROCEDURAL) Inpatient       MEDICATIONS ORDERED:  Orders Placed This Encounter   Medications    albuterol (PROVENTIL) nebulizer solution 5 mg    ipratropium-albuterol (DUONEB) nebulizer solution 1 ampule    albuterol (PROVENTIL) nebulizer solution 5 mg    albuterol sulfate  (90 Base) MCG/ACT inhaler 2 puff    AND Linked Order Group     albuterol sulfate  (90 Base) MCG/ACT inhaler 2 puff     ipratropium (ATROVENT HFA) 17 MCG/ACT inhaler 2 puff      Order Specific Question:   Please select a reason the therapeutic interchange was not accepted:       Answer:   Desiree Pozo for Pharmacy to Substitute    ipratropium (ATROVENT) 0.02 % nebulizer solution 0.5 mg       DIAGNOSTIC RESULTS / EMERGENCY DEPARTMENT COURSE / MDM     LABS:  Results for orders placed or performed during the hospital encounter of 09/12/18   CBC   Result Value Ref Range    WBC 11.8 (H) 3.5 - 11.3 k/uL    RBC 3.53 (L) 3.95 - 5.11 m/uL    Hemoglobin 11.6 (L) 11.9 - 15.1 g/dL

## 2018-09-12 NOTE — CARE COORDINATION
Case Management Initial Discharge Plan  Emi Daniels,         Readmission Risk              Risk of Unplanned Readmission:        0               Met with:patient to discuss discharge plans. Information verified: address, contacts, phone number, , insurance Yes  PCP: Tammy Bryson  Date of last visit: 2 weeks ago    Insurance Provider: [employer]    Discharge Planning    Living Arrangements:      Support Systems:       Home has 1 stories  0  stairs to climb to get into front door. Patient able to perform ADL's:Independent    Current Services (outpatient & in home) none. Discussed skilled nursing, she will decide, preferred provider is [facility]  DME equipment: Requesting a shower bench  DME provider:     Pharmacy: [facility], medications are delivered   Potential Assistance Purchasing Medications:     Does patient want to participate in local refill/ meds to beds program?       Potential Assistance Needed:       Patient agreeable to home care: will decide,   Freedom of choice provided:  Yes, informed preferred provider is [facility]    Prior SNF/Rehab Placement and Facility:   Agreeable to SNF/Rehab: No  [place]eman of choice provided: n/a   Evaluation: no    Expected Discharge date:     Patient expects to be discharged to:      Follow Up Appointment: Best Day/ Time:      Transportation provider: medical cab  Transportation arrangements needed for discharge: Yes    Discharge Plan: return to home, she will decide re: skilled nursing        Electronically signed by Reid Lobato RN on 18 at 4:43 PM

## 2018-09-13 LAB
ABSOLUTE EOS #: 0.13 K/UL (ref 0–0.4)
ABSOLUTE IMMATURE GRANULOCYTE: 1.27 K/UL (ref 0–0.3)
ABSOLUTE LYMPH #: 2.41 K/UL (ref 1–4.8)
ABSOLUTE MONO #: 1.02 K/UL (ref 0.1–0.8)
ALBUMIN SERPL-MCNC: 3.4 G/DL (ref 3.5–5.2)
ALBUMIN/GLOBULIN RATIO: 1.1 (ref 1–2.5)
ALP BLD-CCNC: 45 U/L (ref 35–104)
ALT SERPL-CCNC: 16 U/L (ref 5–33)
ANION GAP SERPL CALCULATED.3IONS-SCNC: 14 MMOL/L (ref 9–17)
AST SERPL-CCNC: 21 U/L
BASOPHILS # BLD: 0 % (ref 0–2)
BASOPHILS ABSOLUTE: 0 K/UL (ref 0–0.2)
BILIRUB SERPL-MCNC: 0.29 MG/DL (ref 0.3–1.2)
BUN BLDV-MCNC: 13 MG/DL (ref 8–23)
BUN/CREAT BLD: ABNORMAL (ref 9–20)
CALCIUM SERPL-MCNC: 9.3 MG/DL (ref 8.6–10.4)
CHLORIDE BLD-SCNC: 99 MMOL/L (ref 98–107)
CO2: 27 MMOL/L (ref 20–31)
CREAT SERPL-MCNC: 0.66 MG/DL (ref 0.5–0.9)
DIFFERENTIAL TYPE: ABNORMAL
EKG ATRIAL RATE: 93 BPM
EKG P AXIS: 62 DEGREES
EKG P-R INTERVAL: 150 MS
EKG Q-T INTERVAL: 358 MS
EKG QRS DURATION: 70 MS
EKG QTC CALCULATION (BAZETT): 445 MS
EKG R AXIS: 53 DEGREES
EKG T AXIS: 20 DEGREES
EKG VENTRICULAR RATE: 93 BPM
EOSINOPHILS RELATIVE PERCENT: 1 % (ref 1–4)
FOLATE: 19.5 NG/ML
GFR AFRICAN AMERICAN: >60 ML/MIN
GFR NON-AFRICAN AMERICAN: >60 ML/MIN
GFR SERPL CREATININE-BSD FRML MDRD: ABNORMAL ML/MIN/{1.73_M2}
GFR SERPL CREATININE-BSD FRML MDRD: ABNORMAL ML/MIN/{1.73_M2}
GLUCOSE BLD-MCNC: 250 MG/DL (ref 70–99)
HCT VFR BLD CALC: 37.8 % (ref 36.3–47.1)
HEMOGLOBIN: 11.8 G/DL (ref 11.9–15.1)
IMMATURE GRANULOCYTES: 10 %
LYMPHOCYTES # BLD: 19 % (ref 24–44)
MCH RBC QN AUTO: 33.3 PG (ref 25.2–33.5)
MCHC RBC AUTO-ENTMCNC: 31.2 G/DL (ref 28.4–34.8)
MCV RBC AUTO: 106.8 FL (ref 82.6–102.9)
MONOCYTES # BLD: 8 % (ref 1–7)
MORPHOLOGY: ABNORMAL
NRBC AUTOMATED: 0.8 PER 100 WBC
PDW BLD-RTO: 15 % (ref 11.8–14.4)
PLATELET # BLD: 475 K/UL (ref 138–453)
PLATELET ESTIMATE: ABNORMAL
PMV BLD AUTO: 9 FL (ref 8.1–13.5)
POTASSIUM SERPL-SCNC: 4.3 MMOL/L (ref 3.7–5.3)
RBC # BLD: 3.54 M/UL (ref 3.95–5.11)
RBC # BLD: ABNORMAL 10*6/UL
SEG NEUTROPHILS: 62 % (ref 36–66)
SEGMENTED NEUTROPHILS ABSOLUTE COUNT: 7.87 K/UL (ref 1.8–7.7)
SODIUM BLD-SCNC: 140 MMOL/L (ref 135–144)
TOTAL PROTEIN: 6.5 G/DL (ref 6.4–8.3)
TROPONIN INTERP: NORMAL
TROPONIN T: <0.03 NG/ML
VITAMIN B-12: 478 PG/ML (ref 232–1245)
WBC # BLD: 12.7 K/UL (ref 3.5–11.3)
WBC # BLD: ABNORMAL 10*3/UL

## 2018-09-13 PROCEDURE — 6370000000 HC RX 637 (ALT 250 FOR IP): Performed by: STUDENT IN AN ORGANIZED HEALTH CARE EDUCATION/TRAINING PROGRAM

## 2018-09-13 PROCEDURE — 2580000003 HC RX 258: Performed by: STUDENT IN AN ORGANIZED HEALTH CARE EDUCATION/TRAINING PROGRAM

## 2018-09-13 PROCEDURE — 82607 VITAMIN B-12: CPT

## 2018-09-13 PROCEDURE — 82746 ASSAY OF FOLIC ACID SERUM: CPT

## 2018-09-13 PROCEDURE — 1200000000 HC SEMI PRIVATE

## 2018-09-13 PROCEDURE — 85025 COMPLETE CBC W/AUTO DIFF WBC: CPT

## 2018-09-13 PROCEDURE — 6360000002 HC RX W HCPCS: Performed by: INTERNAL MEDICINE

## 2018-09-13 PROCEDURE — 94762 N-INVAS EAR/PLS OXIMTRY CONT: CPT

## 2018-09-13 PROCEDURE — G0378 HOSPITAL OBSERVATION PER HR: HCPCS

## 2018-09-13 PROCEDURE — 36415 COLL VENOUS BLD VENIPUNCTURE: CPT

## 2018-09-13 PROCEDURE — 99238 HOSP IP/OBS DSCHRG MGMT 30/<: CPT | Performed by: INTERNAL MEDICINE

## 2018-09-13 PROCEDURE — 80053 COMPREHEN METABOLIC PANEL: CPT

## 2018-09-13 PROCEDURE — 76937 US GUIDE VASCULAR ACCESS: CPT

## 2018-09-13 PROCEDURE — 94640 AIRWAY INHALATION TREATMENT: CPT

## 2018-09-13 PROCEDURE — 6360000002 HC RX W HCPCS: Performed by: STUDENT IN AN ORGANIZED HEALTH CARE EDUCATION/TRAINING PROGRAM

## 2018-09-13 RX ORDER — METOPROLOL SUCCINATE 50 MG/1
50 TABLET, EXTENDED RELEASE ORAL DAILY
Status: DISCONTINUED | OUTPATIENT
Start: 2018-09-14 | End: 2018-09-14 | Stop reason: HOSPADM

## 2018-09-13 RX ORDER — ACETAMINOPHEN 325 MG/1
650 TABLET ORAL EVERY 8 HOURS PRN
Status: DISCONTINUED | OUTPATIENT
Start: 2018-09-13 | End: 2018-09-14 | Stop reason: HOSPADM

## 2018-09-13 RX ORDER — GUAIFENESIN 600 MG/1
600 TABLET, EXTENDED RELEASE ORAL 2 TIMES DAILY
Qty: 20 TABLET | Refills: 0 | Status: ON HOLD | OUTPATIENT
Start: 2018-09-13 | End: 2019-08-10

## 2018-09-13 RX ORDER — METOPROLOL SUCCINATE 50 MG/1
50 TABLET, EXTENDED RELEASE ORAL DAILY
Qty: 30 TABLET | Refills: 3 | Status: ON HOLD | OUTPATIENT
Start: 2018-09-14 | End: 2018-10-25 | Stop reason: HOSPADM

## 2018-09-13 RX ORDER — ALBUTEROL SULFATE 2.5 MG/3ML
2.5 SOLUTION RESPIRATORY (INHALATION) 3 TIMES DAILY
Status: DISCONTINUED | OUTPATIENT
Start: 2018-09-13 | End: 2018-09-14 | Stop reason: HOSPADM

## 2018-09-13 RX ORDER — PANTOPRAZOLE SODIUM 40 MG/1
40 TABLET, DELAYED RELEASE ORAL
Qty: 30 TABLET | Refills: 3 | Status: ON HOLD | OUTPATIENT
Start: 2018-09-14 | End: 2018-11-13 | Stop reason: HOSPADM

## 2018-09-13 RX ADMIN — PANTOPRAZOLE SODIUM 40 MG: 40 TABLET, DELAYED RELEASE ORAL at 08:04

## 2018-09-13 RX ADMIN — GABAPENTIN 300 MG: 300 CAPSULE ORAL at 20:49

## 2018-09-13 RX ADMIN — GABAPENTIN 300 MG: 300 CAPSULE ORAL at 14:29

## 2018-09-13 RX ADMIN — GUAIFENESIN 600 MG: 600 TABLET, EXTENDED RELEASE ORAL at 20:49

## 2018-09-13 RX ADMIN — ACETAMINOPHEN 650 MG: 325 TABLET ORAL at 20:47

## 2018-09-13 RX ADMIN — ALBUTEROL SULFATE 2.5 MG: 2.5 SOLUTION RESPIRATORY (INHALATION) at 09:33

## 2018-09-13 RX ADMIN — METOPROLOL SUCCINATE 25 MG: 25 TABLET, FILM COATED, EXTENDED RELEASE ORAL at 08:04

## 2018-09-13 RX ADMIN — GUAIFENESIN 600 MG: 600 TABLET, EXTENDED RELEASE ORAL at 08:04

## 2018-09-13 RX ADMIN — GABAPENTIN 300 MG: 300 CAPSULE ORAL at 08:04

## 2018-09-13 RX ADMIN — ACETAMINOPHEN 650 MG: 325 TABLET ORAL at 09:49

## 2018-09-13 RX ADMIN — HYDROCHLOROTHIAZIDE 12.5 MG: 12.5 CAPSULE ORAL at 08:04

## 2018-09-13 RX ADMIN — ALBUTEROL SULFATE 2.5 MG: 2.5 SOLUTION RESPIRATORY (INHALATION) at 21:28

## 2018-09-13 RX ADMIN — Medication 10 ML: at 20:49

## 2018-09-13 ASSESSMENT — PAIN SCALES - GENERAL
PAINLEVEL_OUTOF10: 3
PAINLEVEL_OUTOF10: 2

## 2018-09-13 NOTE — DISCHARGE SUMMARY
[address] [facility]   Department of Internal Medicine - Staff Internal Medicine Service    INPATIENT DISCHARGE SUMMARY      PATIENT IDENTIFICATION:  NAME: Rogelio Daniels : 1955 MRN: [id number]  ACCT: [de-identified]     Admit Date: 2018  Discharge date: 2018  3:51 PM     Attending Provider: Arlene att. providers found                                     Dictating Provider: Donna Andrews MD  ______________________________________________________________________________    REASON FOR HOSPITALIZATION:     Principal Problem:    Pneumonia due to Streptococcus pneumoniae ([facility] [address]  Active Problems:    Hepatitis C, chronic ([facility] [address]    Cigarette smoker    COPD (chronic obstructive pulmonary disease) ([facility] [address]    Essential hypertension    CHF (congestive heart failure) ([facility] [address]  Resolved Problems:    * No resolved hospital problems. *        HOSPITAL COURSE AND TREATMENT:  59-year-old female with past medical history of COPD  came to the ED complaining of leg swelling from past few days. She states that she noticed distention of her belly, neck and face from past few days, shortness of breath, productive cough. Chest CT showed atypical pneumonia and patient was treated on levofloxacin for 5 days.  We discontinued amlodipine which might be causing  Her leg swelling and her copd was managed with inhalers albuterol, ipratropium and patient was discharged in a stable condition.        Consults: none    Procedures:  none    Any Hospital Acquired Infections: none      PATIENT'S DISCHARGE CONDITION:      stable      DISCHARGE MEDICATIONS    Discharge Medication List as of 2018  3:22 PM      START taking these medications    Details   guaiFENesin (MUCINEX) 600 MG extended release tablet Take 1 tablet by mouth 2 times daily for 10 days, Disp-20 tablet, R-0Normal      levofloxacin (LEVAQUIN) 500 MG tablet Take 1 tablet by mouth daily for 5 days, Disp-5 tablet, R-0Normal         CONTINUE these medications

## 2018-09-13 NOTE — PLAN OF CARE
Problem: RESPIRATORY  Intervention: Respiratory assessment   BRONCHOSPASM/BRONCHOCONSTRICTION     [x]         IMPROVE AERATION/BREATH SOUNDS  [x]   ADMINISTER BRONCHODILATOR THERAPY AS APPROPRIATE  [x]   ASSESS BREATH SOUNDS  [x]   IMPLEMENT AEROSOL/MDI PROTOCOL  [x]   PATIENT EDUCATION AS NEEDED

## 2018-09-13 NOTE — FLOWSHEET NOTE
visited patient per rounding. Patient was conversant and calm, but expressed dealing with loneliness and trouble between her adult  son and his wife. We explored her feelings, and she vented a great deal about how to fill her time and how to be supportive to her son. Patient occasionally goes to Religion with her sister, but finds a great deal of comfort and relaxation with painting. She and I discussed her life here in [facility], and her eagerness to get home. I encouraged her to keep up with her sister and continue painting. Patient was informed during our visit that she will be sent home later tonight, which seemed to cheer her up further. Patient accepted offer of prayer, and was grateful for the visit. Patient coping appropriately. -  will remain available for spiritual and emotional support as needed. 09/13/18 1332   Encounter Summary   Services provided to: Patient   Referral/Consult From: [facility] Family members   Continue Visiting (9/13/18)   Complexity of Encounter Low   Length of Encounter 30 minutes   Spiritual Assessment Completed Yes   Spiritual/Mosque   Type Spiritual support   Assessment Approachable; Loneliness;Coping   Intervention Active listening;Explored feelings, thoughts, concerns;Explored coping resources;Prayer   Outcome Expressed gratitude;Engaged in conversation; Shared life review;Coping; Shared reminiscences

## 2018-09-14 VITALS
DIASTOLIC BLOOD PRESSURE: 96 MMHG | HEART RATE: 94 BPM | HEIGHT: 59 IN | RESPIRATION RATE: 21 BRPM | OXYGEN SATURATION: 96 % | BODY MASS INDEX: 27.46 KG/M2 | SYSTOLIC BLOOD PRESSURE: 162 MMHG | TEMPERATURE: 98.4 F | WEIGHT: 136.2 LBS

## 2018-09-14 PROCEDURE — 94640 AIRWAY INHALATION TREATMENT: CPT

## 2018-09-14 PROCEDURE — 96372 THER/PROPH/DIAG INJ SC/IM: CPT

## 2018-09-14 PROCEDURE — 6360000002 HC RX W HCPCS: Performed by: INTERNAL MEDICINE

## 2018-09-14 PROCEDURE — G0378 HOSPITAL OBSERVATION PER HR: HCPCS

## 2018-09-14 PROCEDURE — 6370000000 HC RX 637 (ALT 250 FOR IP): Performed by: STUDENT IN AN ORGANIZED HEALTH CARE EDUCATION/TRAINING PROGRAM

## 2018-09-14 PROCEDURE — 2580000003 HC RX 258: Performed by: STUDENT IN AN ORGANIZED HEALTH CARE EDUCATION/TRAINING PROGRAM

## 2018-09-14 PROCEDURE — 6360000002 HC RX W HCPCS: Performed by: STUDENT IN AN ORGANIZED HEALTH CARE EDUCATION/TRAINING PROGRAM

## 2018-09-14 RX ADMIN — GABAPENTIN 300 MG: 300 CAPSULE ORAL at 08:02

## 2018-09-14 RX ADMIN — PANTOPRAZOLE SODIUM 40 MG: 40 TABLET, DELAYED RELEASE ORAL at 08:02

## 2018-09-14 RX ADMIN — ENOXAPARIN SODIUM 40 MG: 40 INJECTION SUBCUTANEOUS at 08:03

## 2018-09-14 RX ADMIN — ALBUTEROL SULFATE 2.5 MG: 2.5 SOLUTION RESPIRATORY (INHALATION) at 08:29

## 2018-09-14 RX ADMIN — METOPROLOL SUCCINATE 50 MG: 50 TABLET, FILM COATED, EXTENDED RELEASE ORAL at 08:01

## 2018-09-14 RX ADMIN — Medication 10 ML: at 08:02

## 2018-09-14 RX ADMIN — HYDROCHLOROTHIAZIDE 12.5 MG: 12.5 CAPSULE ORAL at 08:02

## 2018-09-14 RX ADMIN — GUAIFENESIN 600 MG: 600 TABLET, EXTENDED RELEASE ORAL at 08:01

## 2018-09-14 NOTE — PROGRESS NOTES
BRONCHOSPASM/BRONCHOCONSTRICTION     [x]         IMPROVE AERATION/BREATH SOUNDS  [x]   ADMINISTER BRONCHODILATOR THERAPY AS APPROPRIATE  [x]   ASSESS BREATH SOUNDS  [x]   IMPLEMENT AEROSOL/MDI PROTOCOL  [x]   PATIENT EDUCATION AS NEEDED
FRANCISCA LONG, [facility]. Shortness of breath [R06.02] . Vitals:    09/13/18 0718   BP: (!) 154/77   Pulse: 87   Resp: 15   Temp: 98.1 °F (36.7 °C)   SpO2: 99%   . Patients home meds are   Prior to Admission medications    Medication Sig Start Date End Date Taking? Authorizing Provider   guaiFENesin (MUCINEX) 600 MG extended release tablet Take 1 tablet by mouth 2 times daily for 10 days 9/7/18 9/17/18  Yvonne Carrillo MD   levofloxacin (LEVAQUIN) 500 MG tablet Take 1 tablet by mouth daily for 5 days 9/8/18 9/13/18  Yvonne Carrillo MD   hydrochlorothiazide (MICROZIDE) 12.5 MG capsule Take 12.5 mg by mouth every morning    Historical Provider, MD   albuterol sulfate HFA (PROVENTIL HFA) 108 (90 Base) MCG/ACT inhaler Inhale 1-2 puffs into the lungs every 4 hours as needed for Wheezing or Shortness of Breath (Space out to every 6 hours as symptoms improve) 8/31/18   JAYDON Kimble - CNP   acetaminophen (APAP EXTRA STRENGTH) 500 MG tablet Take 2 tablets by mouth every 8 hours as needed for Pain 8/12/18   Kevin Martinez MD   ipratropium-albuterol (DUONEB) 0.5-2.5 (3) MG/3ML SOLN nebulizer solution Inhale 3 mLs into the lungs every 4 hours as needed for Shortness of Breath 8/12/18   Kevin Martinez MD   gabapentin (NEURONTIN) 300 MG capsule Take 1 capsule by mouth 3 times daily for 120 days. . 8/12/18 12/10/18  Kevin Martinez MD   lisinopril (PRINIVIL;ZESTRIL) 10 MG tablet Take 1 tablet by mouth daily 8/12/18   Kevin Martinez MD   metoprolol succinate (TOPROL XL) 25 MG extended release tablet Take 1 tablet by mouth daily 8/12/18   Kevin Martinez MD   Multiple Vitamins-Minerals (THERAPEUTIC MULTIVITAMIN-MINERALS) tablet Take 1 tablet by mouth daily 8/12/18   Kevin Martinez MD   cyclobenzaprine (FLEXERIL) 5 MG tablet Take 5 mg by mouth 2 times daily as needed for Muscle spasms    Historical Provider, MD   SUBOXONE 8-2 MG FILM  1/19/17   Historical Provider, MD   .  Recent Surgical History:
[facility]. Shortness of breath [R06.02] . Vitals:    09/12/18 1640   BP: (!) 151/85   Pulse: 92   Resp: 18   Temp: 98.7 °F (37.1 °C)   SpO2: 98%   . Patients home meds are   Prior to Admission medications    Medication Sig Start Date End Date Taking? Authorizing Provider   guaiFENesin (MUCINEX) 600 MG extended release tablet Take 1 tablet by mouth 2 times daily for 10 days 9/7/18 9/17/18  Mary Horowitz MD   levofloxacin (LEVAQUIN) 500 MG tablet Take 1 tablet by mouth daily for 5 days 9/8/18 9/13/18  Mary Horowitz MD   hydrochlorothiazide (MICROZIDE) 12.5 MG capsule Take 12.5 mg by mouth every morning    Historical Provider, MD   albuterol sulfate HFA (PROVENTIL HFA) 108 (90 Base) MCG/ACT inhaler Inhale 1-2 puffs into the lungs every 4 hours as needed for Wheezing or Shortness of Breath (Space out to every 6 hours as symptoms improve) 8/31/18   Leeta Opitz, APRN - CNP   acetaminophen (APAP EXTRA STRENGTH) 500 MG tablet Take 2 tablets by mouth every 8 hours as needed for Pain 8/12/18   Linda Wiseman MD   ipratropium-albuterol (DUONEB) 0.5-2.5 (3) MG/3ML SOLN nebulizer solution Inhale 3 mLs into the lungs every 4 hours as needed for Shortness of Breath 8/12/18   Linda Wiseman MD   gabapentin (NEURONTIN) 300 MG capsule Take 1 capsule by mouth 3 times daily for 120 days. . 8/12/18 12/10/18  Linda Wiseman MD   lisinopril (PRINIVIL;ZESTRIL) 10 MG tablet Take 1 tablet by mouth daily 8/12/18   Linda Wiseman MD   metoprolol succinate (TOPROL XL) 25 MG extended release tablet Take 1 tablet by mouth daily 8/12/18   Linda Wiseman MD   Multiple Vitamins-Minerals (THERAPEUTIC MULTIVITAMIN-MINERALS) tablet Take 1 tablet by mouth daily 8/12/18   Linda Wiseman MD   cyclobenzaprine (FLEXERIL) 5 MG tablet Take 5 mg by mouth 2 times daily as needed for Muscle spasms    Historical Provider, MD   SUBOXONE 8-2 MG FILM  1/19/17   Historical Provider, MD   .  Recent Surgical History: None = 0
Physical Therapy  DATE: 2018    NAME: Arlene Cordon  MRN: [id number]   : 1955    Patient not seen this date for Physical Therapy due to:  [] Blood transfusion in progress  [] Hemodialysis  [x]  Patient Declined: pt trying to rest. She states she has not been able to sleep much and declines PT at this time. Check back in pm as time allows or . [] Spine Precautions   [] Strict Bedrest  [] Surgery/ Procedure  [] Testing      [] Other        [] PT being discontinued at this time. Patient independent. No further needs. [] PT being discontinued at this time as the patient has been transferred to palliative care. No further needs. BETY Rios  Evaluation/treatment performed by Student PT under the supervision of co-signing PT who agrees with all evaluation/treatment and documentation.
Physical Therapy  DATE: 2018    NAME: Dulce Parekh  MRN: [id number]   : 1955    Patient not seen this date for Physical Therapy due to:  [] Blood transfusion in progress  [] Hemodialysis  []  Patient Declined  [] Spine Precautions   [] Strict Bedrest  [] Surgery/ Procedure  [] Testing      [] Other        [x] PT being discontinued at this time. Patient independent. No further needs. Pt verbalized agreement and understanding, plans to d/c home this date. [] PT being discontinued at this time as the patient has been transferred to palliative care. No further needs.     Patricia Browne, PT
Writer received discharge for patient but patient doesn't have a ride home tonight.  Patient will need to go home tomorrow 9/14/18
Q6H PRN   albuterol 2.5 mg As Directed RT PRN       Diagnostic Labs and Imaging:  CBC:  Recent Labs      09/12/18   1246  09/13/18   0547   WBC  11.8*  12.7*   HGB  11.6*  11.8*   PLT  557*  475*     BMP: Recent Labs      09/12/18   1246  09/13/18   0547   NA  146*  140   K  3.7  4.3   CL  98  99   CO2  35*  27   BUN  15  13   CREATININE  0.85  0.66   GLUCOSE  221*  250*     Hepatic: Recent Labs      09/12/18   1246  09/13/18   0547   AST  20  21   ALT  17  16   BILITOT  0.31  0.29*   ALKPHOS  44  45     Assessment and Plan:    Chest pain : likely due to atelectasis. Tylenol PRN for pain. 1. Increased bronchial secretions : Acapella, continue Mucinex. 2. COPD : not in exacerbation : continue albuterol, duoneb, dulera  3. Hypertension : uncontrolled. restart HCTZ 12.5 mg OD , increased Toprol XL 25 mg to 50 mg once. 4. Restart Sub oxone , once dose confirmed by Pharmacy. Urine toxicology . 5. DVT prophylaxis : Lovenox   6. Discharge planning : case management . Mamta Tenorio,   Internal Medicine Resident,  [facility]. Attending Physician Statement  I have discussed the case, including pertinent history and exam findings with the resident and the team.  I have seen and examined the patient and the key elements of the encounter have been performed by me. I agree with the assessment, plan and orders as documented by the resident.       Camiu Lorne Clement MD, ADDIE, [address]  Attending Physician, Internal Medicine Service    Internal Medicine Residency Program  9/13/2018, 2:37 PM

## 2018-09-14 NOTE — DISCHARGE SUMMARY
[facility]   Department of Internal Medicine - Staff Internal Medicine Service    INPATIENT DISCHARGE SUMMARY      PATIENT IDENTIFICATION:  NAME: Evelyn Dhillon : 1955 MRN: [id number]  ACCT: [de-identified]     Admit Date: 2018  Discharge date: No discharge date for patient encounter. Attending Provider: Santina Crigler, MD                                     Dictating Provider: Mu Villagran MD  ______________________________________________________________________________    REASON FOR HOSPITALIZATION:     Principal Problem:    Chest pain  Active Problems:    Hepatitis C, chronic (HCC)    GERD (gastroesophageal reflux disease)    COPD (chronic obstructive pulmonary disease) (HCC)    Hyperglycemia    Essential hypertension    Shortness of breath  Resolved Problems:    * No resolved hospital problems. *        HOSPITAL COURSE AND TREATMENT:  55-year-old female with past medical history of COPD  came to the ED complaining of leg swelling from past few days. She states that she noticed distention of her belly, neck and face from past few days, shortness of breath, productive cough. Chest CT showed atypical pneumonia and patient was treated on levofloxacin for 5 days. We discontinued amlodipine which might be causing  Her leg swelling and her copd was managed with inhalers albuterol, ipratropium and patient was discharged in a stable condition.      Consults: none    Procedures:  none    Any Hospital Acquired Infections: none      PATIENT'S DISCHARGE CONDITION:      stable      DISCHARGE MEDICATIONS    Current Discharge Medication List      CONTINUE these medications which have NOT CHANGED    Details   guaiFENesin (MUCINEX) 600 MG extended release tablet Take 1 tablet by mouth 2 times daily for 10 days  Qty: 20 tablet, Refills: 0      levofloxacin (LEVAQUIN) 500 MG tablet Take 1 tablet by mouth daily for 5 days  Qty: 5 tablet, Refills: 0      hydrochlorothiazide
these medications which have NOT CHANGED    Details   hydrochlorothiazide (MICROZIDE) 12.5 MG capsule Take 12.5 mg by mouth every morning      albuterol sulfate HFA (PROVENTIL HFA) 108 (90 Base) MCG/ACT inhaler Inhale 1-2 puffs into the lungs every 4 hours as needed for Wheezing or Shortness of Breath (Space out to every 6 hours as symptoms improve)  Qty: 1 Inhaler, Refills: 5    Associated Diagnoses: COPD, moderate (HCC)      acetaminophen (APAP EXTRA STRENGTH) 500 MG tablet Take 2 tablets by mouth every 8 hours as needed for Pain  Qty: 60 tablet, Refills: 0      ipratropium-albuterol (DUONEB) 0.5-2.5 (3) MG/3ML SOLN nebulizer solution Inhale 3 mLs into the lungs every 4 hours as needed for Shortness of Breath  Qty: 360 mL, Refills: 2      lisinopril (PRINIVIL;ZESTRIL) 10 MG tablet Take 1 tablet by mouth daily  Qty: 30 tablet, Refills: 3      Multiple Vitamins-Minerals (THERAPEUTIC MULTIVITAMIN-MINERALS) tablet Take 1 tablet by mouth daily  Qty: 30 tablet, Refills: 3         STOP taking these medications       levofloxacin (LEVAQUIN) 500 MG tablet Comments:   Reason for Stopping:         gabapentin (NEURONTIN) 300 MG capsule Comments:   Reason for Stopping:         cyclobenzaprine (FLEXERIL) 5 MG tablet Comments:   Reason for Stopping:         SUBOXONE 8-2 MG FILM Comments:   Reason for Stopping:                 DISCHARGE INSTRUCTIONS:     Activity: activity as tolerated    Diet: diabetic diet    Disposition: home      FOLLOW UP INSTRUCTIONS:    Labs: none    Imaging: none    Post hospital office visit:  none

## 2018-10-10 ENCOUNTER — HOSPITAL ENCOUNTER (INPATIENT)
Age: 63
LOS: 2 days | Discharge: HOME | DRG: 194 | End: 2018-10-12
Attending: EMERGENCY MEDICINE | Admitting: INTERNAL MEDICINE
Payer: MEDICARE

## 2018-10-10 ENCOUNTER — APPOINTMENT (OUTPATIENT)
Dept: GENERAL RADIOLOGY | Age: 63
DRG: 194 | End: 2018-10-10
Payer: MEDICARE

## 2018-10-10 DIAGNOSIS — B18.2 CHRONIC HEPATITIS C WITHOUT HEPATIC COMA (HCC): ICD-10-CM

## 2018-10-10 DIAGNOSIS — R06.03 RESPIRATORY DISTRESS: Primary | ICD-10-CM

## 2018-10-10 DIAGNOSIS — R14.0 ABDOMINAL DISTENSION: ICD-10-CM

## 2018-10-10 DIAGNOSIS — I50.9 CONGESTIVE HEART FAILURE, UNSPECIFIED HF CHRONICITY, UNSPECIFIED HEART FAILURE TYPE (HCC): ICD-10-CM

## 2018-10-10 PROBLEM — I50.33 HEART FAILURE, DIASTOLIC, WITH ACUTE DECOMPENSATION (HCC): Status: ACTIVE | Noted: 2018-09-05

## 2018-10-10 LAB
-: NORMAL
ALLEN TEST: ABNORMAL
ANION GAP SERPL CALCULATED.3IONS-SCNC: 11 MMOL/L (ref 9–17)
ANION GAP: 3 MMOL/L (ref 7–16)
BNP INTERPRETATION: NORMAL
BUN BLDV-MCNC: 17 MG/DL (ref 8–23)
BUN/CREAT BLD: ABNORMAL (ref 9–20)
C-REACTIVE PROTEIN: 36.9 MG/L (ref 0–5)
CALCIUM SERPL-MCNC: 8.8 MG/DL (ref 8.6–10.4)
CHLORIDE BLD-SCNC: 98 MMOL/L (ref 98–107)
CO2: 28 MMOL/L (ref 20–31)
CREAT SERPL-MCNC: 0.93 MG/DL (ref 0.5–0.9)
D-DIMER QUANTITATIVE: 1.29 MG/L FEU
FIO2: ABNORMAL
GFR AFRICAN AMERICAN: >60 ML/MIN
GFR NON-AFRICAN AMERICAN: >60 ML/MIN
GFR NON-AFRICAN AMERICAN: >60 ML/MIN
GFR SERPL CREATININE-BSD FRML MDRD: >60 ML/MIN
GFR SERPL CREATININE-BSD FRML MDRD: ABNORMAL ML/MIN/{1.73_M2}
GFR SERPL CREATININE-BSD FRML MDRD: ABNORMAL ML/MIN/{1.73_M2}
GFR SERPL CREATININE-BSD FRML MDRD: NORMAL ML/MIN/{1.73_M2}
GLUCOSE BLD-MCNC: 129 MG/DL (ref 65–105)
GLUCOSE BLD-MCNC: 158 MG/DL (ref 65–105)
GLUCOSE BLD-MCNC: 260 MG/DL (ref 70–99)
GLUCOSE BLD-MCNC: 265 MG/DL (ref 74–100)
HCO3 VENOUS: 35.6 MMOL/L (ref 22–29)
HCT VFR BLD CALC: 42.6 % (ref 36.3–47.1)
HEMOGLOBIN: 13 G/DL (ref 11.9–15.1)
MCH RBC QN AUTO: 33.8 PG (ref 25.2–33.5)
MCHC RBC AUTO-ENTMCNC: 30.5 G/DL (ref 28.4–34.8)
MCV RBC AUTO: 110.6 FL (ref 82.6–102.9)
MODE: ABNORMAL
NEGATIVE BASE EXCESS, VEN: ABNORMAL (ref 0–2)
NRBC AUTOMATED: 0.3 PER 100 WBC
O2 DEVICE/FLOW/%: ABNORMAL
O2 SAT, VEN: 81 % (ref 60–85)
PATIENT TEMP: ABNORMAL
PCO2, VEN: 57.5 MM HG (ref 41–51)
PDW BLD-RTO: 15.3 % (ref 11.8–14.4)
PH VENOUS: 7.4 (ref 7.32–7.43)
PLATELET # BLD: 455 K/UL (ref 138–453)
PMV BLD AUTO: 9.1 FL (ref 8.1–13.5)
PO2, VEN: 47.2 MM HG (ref 30–50)
POC CHLORIDE: 103 MMOL/L (ref 98–107)
POC CREATININE: 0.88 MG/DL (ref 0.51–1.19)
POC HEMATOCRIT: 47 % (ref 36–46)
POC HEMOGLOBIN: 16.1 G/DL (ref 12–16)
POC IONIZED CALCIUM: 1.14 MMOL/L (ref 1.15–1.33)
POC LACTIC ACID: 2.13 MMOL/L (ref 0.56–1.39)
POC PCO2 TEMP: ABNORMAL MM HG
POC PH TEMP: ABNORMAL
POC PO2 TEMP: ABNORMAL MM HG
POC POTASSIUM: 4.4 MMOL/L (ref 3.5–4.5)
POC SODIUM: 142 MMOL/L (ref 138–146)
POC TROPONIN I: 0.01 NG/ML (ref 0–0.1)
POC TROPONIN INTERP: NORMAL
POSITIVE BASE EXCESS, VEN: 8 (ref 0–3)
POTASSIUM SERPL-SCNC: 4.4 MMOL/L (ref 3.7–5.3)
PRO-BNP: 102 PG/ML
PROCALCITONIN: 0.14 NG/ML
RBC # BLD: 3.85 M/UL (ref 3.95–5.11)
REASON FOR REJECTION: NORMAL
SAMPLE SITE: ABNORMAL
SEDIMENTATION RATE, ERYTHROCYTE: 20 MM (ref 0–20)
SODIUM BLD-SCNC: 137 MMOL/L (ref 135–144)
T3 TOTAL: 126 NG/DL (ref 80–200)
T4 TOTAL: 7.4 UG/DL (ref 4.5–12)
TOTAL CO2, VENOUS: 37 MMOL/L (ref 23–30)
TROPONIN INTERP: NORMAL
TROPONIN INTERP: NORMAL
TROPONIN T: <0.03 NG/ML
TROPONIN T: <0.03 NG/ML
TSH SERPL DL<=0.05 MIU/L-ACNC: 0.37 MIU/L (ref 0.3–5)
WBC # BLD: 13.2 K/UL (ref 3.5–11.3)
ZZ NTE CLEAN UP: ORDERED TEST: NORMAL
ZZ NTE WITH NAME CLEAN UP: SPECIMEN SOURCE: NORMAL

## 2018-10-10 PROCEDURE — 84443 ASSAY THYROID STIM HORMONE: CPT

## 2018-10-10 PROCEDURE — 94660 CPAP INITIATION&MGMT: CPT

## 2018-10-10 PROCEDURE — 84480 ASSAY TRIIODOTHYRONINE (T3): CPT

## 2018-10-10 PROCEDURE — 83605 ASSAY OF LACTIC ACID: CPT

## 2018-10-10 PROCEDURE — 94640 AIRWAY INHALATION TREATMENT: CPT

## 2018-10-10 PROCEDURE — 85651 RBC SED RATE NONAUTOMATED: CPT

## 2018-10-10 PROCEDURE — 96374 THER/PROPH/DIAG INJ IV PUSH: CPT

## 2018-10-10 PROCEDURE — 2580000003 HC RX 258: Performed by: STUDENT IN AN ORGANIZED HEALTH CARE EDUCATION/TRAINING PROGRAM

## 2018-10-10 PROCEDURE — 82435 ASSAY OF BLOOD CHLORIDE: CPT

## 2018-10-10 PROCEDURE — 87449 NOS EACH ORGANISM AG IA: CPT

## 2018-10-10 PROCEDURE — 85027 COMPLETE CBC AUTOMATED: CPT

## 2018-10-10 PROCEDURE — 82947 ASSAY GLUCOSE BLOOD QUANT: CPT

## 2018-10-10 PROCEDURE — 2060000000 HC ICU INTERMEDIATE R&B

## 2018-10-10 PROCEDURE — 99285 EMERGENCY DEPT VISIT HI MDM: CPT

## 2018-10-10 PROCEDURE — 94762 N-INVAS EAR/PLS OXIMTRY CONT: CPT

## 2018-10-10 PROCEDURE — 6360000002 HC RX W HCPCS: Performed by: STUDENT IN AN ORGANIZED HEALTH CARE EDUCATION/TRAINING PROGRAM

## 2018-10-10 PROCEDURE — 6360000002 HC RX W HCPCS: Performed by: EMERGENCY MEDICINE

## 2018-10-10 PROCEDURE — 85379 FIBRIN DEGRADATION QUANT: CPT

## 2018-10-10 PROCEDURE — 82607 VITAMIN B-12: CPT

## 2018-10-10 PROCEDURE — 84132 ASSAY OF SERUM POTASSIUM: CPT

## 2018-10-10 PROCEDURE — 6370000000 HC RX 637 (ALT 250 FOR IP): Performed by: STUDENT IN AN ORGANIZED HEALTH CARE EDUCATION/TRAINING PROGRAM

## 2018-10-10 PROCEDURE — 82330 ASSAY OF CALCIUM: CPT

## 2018-10-10 PROCEDURE — 85014 HEMATOCRIT: CPT

## 2018-10-10 PROCEDURE — 87804 INFLUENZA ASSAY W/OPTIC: CPT

## 2018-10-10 PROCEDURE — 84484 ASSAY OF TROPONIN QUANT: CPT

## 2018-10-10 PROCEDURE — 6370000000 HC RX 637 (ALT 250 FOR IP): Performed by: EMERGENCY MEDICINE

## 2018-10-10 PROCEDURE — 36415 COLL VENOUS BLD VENIPUNCTURE: CPT

## 2018-10-10 PROCEDURE — 82803 BLOOD GASES ANY COMBINATION: CPT

## 2018-10-10 PROCEDURE — 82565 ASSAY OF CREATININE: CPT

## 2018-10-10 PROCEDURE — 93005 ELECTROCARDIOGRAM TRACING: CPT

## 2018-10-10 PROCEDURE — 83880 ASSAY OF NATRIURETIC PEPTIDE: CPT

## 2018-10-10 PROCEDURE — 87899 AGENT NOS ASSAY W/OPTIC: CPT

## 2018-10-10 PROCEDURE — 84436 ASSAY OF TOTAL THYROXINE: CPT

## 2018-10-10 PROCEDURE — 82746 ASSAY OF FOLIC ACID SERUM: CPT

## 2018-10-10 PROCEDURE — 71045 X-RAY EXAM CHEST 1 VIEW: CPT

## 2018-10-10 PROCEDURE — 84295 ASSAY OF SERUM SODIUM: CPT

## 2018-10-10 PROCEDURE — 84145 PROCALCITONIN (PCT): CPT

## 2018-10-10 PROCEDURE — 80048 BASIC METABOLIC PNL TOTAL CA: CPT

## 2018-10-10 PROCEDURE — 86140 C-REACTIVE PROTEIN: CPT

## 2018-10-10 RX ORDER — ACETAMINOPHEN 500 MG
1000 TABLET ORAL ONCE
Status: COMPLETED | OUTPATIENT
Start: 2018-10-10 | End: 2018-10-10

## 2018-10-10 RX ORDER — GABAPENTIN 300 MG/1
300 CAPSULE ORAL 3 TIMES DAILY
COMMUNITY
End: 2020-12-08 | Stop reason: SDUPTHER

## 2018-10-10 RX ORDER — INSULIN GLARGINE 100 [IU]/ML
10 INJECTION, SOLUTION SUBCUTANEOUS NIGHTLY
Status: DISCONTINUED | OUTPATIENT
Start: 2018-10-10 | End: 2018-10-11

## 2018-10-10 RX ORDER — SODIUM CHLORIDE 0.9 % (FLUSH) 0.9 %
10 SYRINGE (ML) INJECTION PRN
Status: DISCONTINUED | OUTPATIENT
Start: 2018-10-10 | End: 2018-10-12 | Stop reason: HOSPADM

## 2018-10-10 RX ORDER — FUROSEMIDE 10 MG/ML
40 INJECTION INTRAMUSCULAR; INTRAVENOUS ONCE
Status: COMPLETED | OUTPATIENT
Start: 2018-10-10 | End: 2018-10-10

## 2018-10-10 RX ORDER — ONDANSETRON 2 MG/ML
4 INJECTION INTRAMUSCULAR; INTRAVENOUS EVERY 6 HOURS PRN
Status: DISCONTINUED | OUTPATIENT
Start: 2018-10-10 | End: 2018-10-12 | Stop reason: HOSPADM

## 2018-10-10 RX ORDER — LABETALOL HYDROCHLORIDE 5 MG/ML
10 INJECTION, SOLUTION INTRAVENOUS EVERY 6 HOURS PRN
Status: DISCONTINUED | OUTPATIENT
Start: 2018-10-10 | End: 2018-10-12 | Stop reason: HOSPADM

## 2018-10-10 RX ORDER — PREDNISONE 20 MG/1
40 TABLET ORAL DAILY
Status: DISCONTINUED | OUTPATIENT
Start: 2018-10-10 | End: 2018-10-11

## 2018-10-10 RX ORDER — GABAPENTIN 300 MG/1
300 CAPSULE ORAL 3 TIMES DAILY
Status: DISCONTINUED | OUTPATIENT
Start: 2018-10-10 | End: 2018-10-12 | Stop reason: HOSPADM

## 2018-10-10 RX ORDER — ALBUTEROL SULFATE 2.5 MG/3ML
2.5 SOLUTION RESPIRATORY (INHALATION)
Status: DISCONTINUED | OUTPATIENT
Start: 2018-10-10 | End: 2018-10-11

## 2018-10-10 RX ORDER — LEVOFLOXACIN 5 MG/ML
500 INJECTION, SOLUTION INTRAVENOUS EVERY 24 HOURS
Status: DISCONTINUED | OUTPATIENT
Start: 2018-10-10 | End: 2018-10-11

## 2018-10-10 RX ORDER — LISINOPRIL 5 MG/1
5 TABLET ORAL DAILY
Status: DISCONTINUED | OUTPATIENT
Start: 2018-10-10 | End: 2018-10-11

## 2018-10-10 RX ORDER — FUROSEMIDE 10 MG/ML
40 INJECTION INTRAMUSCULAR; INTRAVENOUS 2 TIMES DAILY
Status: DISCONTINUED | OUTPATIENT
Start: 2018-10-10 | End: 2018-10-11

## 2018-10-10 RX ORDER — SODIUM CHLORIDE 0.9 % (FLUSH) 0.9 %
10 SYRINGE (ML) INJECTION EVERY 12 HOURS SCHEDULED
Status: DISCONTINUED | OUTPATIENT
Start: 2018-10-10 | End: 2018-10-12 | Stop reason: HOSPADM

## 2018-10-10 RX ORDER — LEVOFLOXACIN 500 MG/1
500 TABLET, FILM COATED ORAL ONCE
Status: COMPLETED | OUTPATIENT
Start: 2018-10-10 | End: 2018-10-10

## 2018-10-10 RX ORDER — BUPRENORPHINE AND NALOXONE 12; 3 MG/1; MG/1
1 FILM, SOLUBLE BUCCAL; SUBLINGUAL DAILY
Status: ON HOLD | COMMUNITY
End: 2019-08-10

## 2018-10-10 RX ADMIN — ACETAMINOPHEN 1000 MG: 500 TABLET ORAL at 13:47

## 2018-10-10 RX ADMIN — GABAPENTIN 300 MG: 300 CAPSULE ORAL at 21:02

## 2018-10-10 RX ADMIN — PREDNISONE 40 MG: 20 TABLET ORAL at 20:47

## 2018-10-10 RX ADMIN — FUROSEMIDE 40 MG: 10 INJECTION, SOLUTION INTRAMUSCULAR; INTRAVENOUS at 20:47

## 2018-10-10 RX ADMIN — ALBUTEROL SULFATE 2.5 MG: 2.5 SOLUTION RESPIRATORY (INHALATION) at 20:09

## 2018-10-10 RX ADMIN — LEVOFLOXACIN 500 MG: 500 TABLET, FILM COATED ORAL at 23:55

## 2018-10-10 RX ADMIN — FUROSEMIDE 40 MG: 10 INJECTION, SOLUTION INTRAMUSCULAR; INTRAVENOUS at 11:28

## 2018-10-10 RX ADMIN — Medication 10 ML: at 20:49

## 2018-10-10 ASSESSMENT — PAIN SCALES - GENERAL
PAINLEVEL_OUTOF10: 9
PAINLEVEL_OUTOF10: 8

## 2018-10-10 ASSESSMENT — PAIN DESCRIPTION - PAIN TYPE: TYPE: CHRONIC PAIN

## 2018-10-10 ASSESSMENT — PAIN DESCRIPTION - LOCATION: LOCATION: FOOT

## 2018-10-10 NOTE — ED NOTES
Report received from [facility]. Pt sitting on side of bed with meal tray. Pt provided with warm blankets and updated on poc and awaiting bed placement. Pt denies further needs at this time. Call light within reach, will continue to monitor.       Eduardo Bone RN  10/10/18 1458

## 2018-10-10 NOTE — ED PROVIDER NOTES
[facility] ED  Emergency Department  Emergency Medicine Resident Sign-out     Care of Emi Daniels was assumed from Dr. Purvi Ortiz and is being seen for Respiratory Distress  . The patient's initial evaluation and plan have been discussed with the prior provider who initially evaluated the patient. EMERGENCY DEPARTMENT COURSE / MEDICAL DECISION MAKING:       MEDICATIONS GIVEN:  Orders Placed This Encounter   Medications    furosemide (LASIX) injection 40 mg    acetaminophen (TYLENOL) tablet 1,000 mg       LABS / RADIOLOGY:     Labs Reviewed   CBC - Abnormal; Notable for the following:        Result Value    WBC 13.2 (*)     RBC 3.85 (*)     .6 (*)     MCH 33.8 (*)     RDW 15.3 (*)     Platelets 576 (*)     NRBC Automated 0.3 (*)     All other components within normal limits   BASIC METABOLIC PANEL - Abnormal; Notable for the following:     Glucose 260 (*)     CREATININE 0.93 (*)     All other components within normal limits   HGB/HCT - Abnormal; Notable for the following:     POC Hemoglobin 16.1 (*)     POC Hematocrit 47 (*)     All other components within normal limits   CALCIUM, IONIC (POC) - Abnormal; Notable for the following:     POC Ionized Calcium 1.14 (*)     All other components within normal limits   VENOUS BLOOD GAS, POINT OF CARE - Abnormal; Notable for the following:     pCO2, Praveen 57.5 (*)     HCO3, Venous 35.6 (*)     Total CO2, Venous 37 (*)     Positive Base Excess, Praveen 8 (*)     All other components within normal limits   LACTIC ACID,POINT OF CARE - Abnormal; Notable for the following:     POC Lactic Acid 2.13 (*)     All other components within normal limits   POCT GLUCOSE - Abnormal; Notable for the following:     POC Glucose 265 (*)     All other components within normal limits   ANION GAP (CALC) POC - Abnormal; Notable for the following:      Anion Gap 3 (*)     All other components within normal limits   BRAIN NATRIURETIC PEPTIDE   SODIUM (POC)   POTASSIUM (POC)

## 2018-10-10 NOTE — CARE COORDINATION
Patient currently on Bipap, no family at bedside. Initial discharge planning deferred secondary to patient SOB.

## 2018-10-10 NOTE — ED NOTES
Patient resting on stretcher and denies any concerns at this time. Patient remains on Bipap. Will continue to monitor.          Meena López RN  10/10/18 5715

## 2018-10-10 NOTE — ED PROVIDER NOTES
Peptide    Hemoglobin and hematocrit, blood    SODIUM (POC)    POTASSIUM (POC)    CHLORIDE (POC)    CALCIUM, IONIC (POC)    Telemetry monitoring    Inpatient consult to Internal Medicine    BIPAP    POCT troponin    POC CHEMISTRY (NA,K,ICA,GLU,CALC HCT/HGB,LACTATE,CREA,CL)    POCT troponin    Venous Blood Gas, POC    Creatinine W/GFR Point of Care    Lactic Acid, POC    POCT Glucose    Anion Gap (Calc) POC    EKG 12 Lead    Insert peripheral IV    PATIENT STATUS (FROM ED OR OR/PROCEDURAL) Inpatient       MEDICATIONS ORDERED:  Orders Placed This Encounter   Medications    furosemide (LASIX) injection 40 mg    acetaminophen (TYLENOL) tablet 1,000 mg       DIAGNOSTIC RESULTS / EMERGENCY DEPARTMENT COURSE / MDM     LABS:  Results for orders placed or performed during the hospital encounter of 10/10/18   CBC   Result Value Ref Range    WBC 13.2 (H) 3.5 - 11.3 k/uL    RBC 3.85 (L) 3.95 - 5.11 m/uL    Hemoglobin 13.0 11.9 - 15.1 g/dL    Hematocrit 42.6 36.3 - 47.1 %    .6 (H) 82.6 - 102.9 fL    MCH 33.8 (H) 25.2 - 33.5 pg    MCHC 30.5 28.4 - 34.8 g/dL    RDW 15.3 (H) 11.8 - 14.4 %    Platelets 177 (H) 950 - 453 k/uL    MPV 9.1 8.1 - 13.5 fL    NRBC Automated 0.3 (H) 0.0 per 100 WBC   BASIC METABOLIC PANEL   Result Value Ref Range    Glucose 260 (H) 70 - 99 mg/dL    BUN 17 8 - 23 mg/dL    CREATININE 0.93 (H) 0.50 - 0.90 mg/dL    Bun/Cre Ratio NOT REPORTED 9 - 20    Calcium 8.8 8.6 - 10.4 mg/dL    Sodium 137 135 - 144 mmol/L    Potassium 4.4 3.7 - 5.3 mmol/L    Chloride 98 98 - 107 mmol/L    CO2 28 20 - 31 mmol/L    Anion Gap 11 9 - 17 mmol/L    GFR Non-African American >60 >60 mL/min    GFR African American >60 >60 mL/min    GFR Comment          GFR Staging NOT REPORTED    Brain Natriuretic Peptide   Result Value Ref Range    Pro- <300 pg/mL    BNP Interpretation         Hemoglobin and hematocrit, blood   Result Value Ref Range    POC Hemoglobin 16.1 (H) 12.0 - 16.0 g/dL    POC Hematocrit 47 (H) 36 - 46 %   SODIUM (POC)   Result Value Ref Range    POC Sodium 142 138 - 146 mmol/L   POTASSIUM (POC)   Result Value Ref Range    POC Potassium 4.4 3.5 - 4.5 mmol/L   CHLORIDE (POC)   Result Value Ref Range    POC Chloride 103 98 - 107 mmol/L   CALCIUM, IONIC (POC)   Result Value Ref Range    POC Ionized Calcium 1.14 (L) 1.15 - 1.33 mmol/L   POCT troponin   Result Value Ref Range    POC Troponin I 0.01 0.00 - 0.10 ng/mL    POC Troponin Interp       The Troponin-I (POC) results cannot be compared to the Troponin-T results.    Venous Blood Gas, POC   Result Value Ref Range    pH, Praveen 7.400 7.320 - 7.430    pCO2, Praveen 57.5 (H) 41.0 - 51.0 mm Hg    pO2, Praveen 47.2 30.0 - 50.0 mm Hg    HCO3, Venous 35.6 (H) 22.0 - 29.0 mmol/L    Total CO2, Venous 37 (H) 23.0 - 30.0 mmol/L    Negative Base Excess, Praveen NOT REPORTED 0.0 - 2.0    Positive Base Excess, Praveen 8 (H) 0.0 - 3.0    O2 Sat, Praveen 81 60.0 - 85.0 %    O2 Device/Flow/% NOT REPORTED     Ruslan Test NOT REPORTED     Sample Site NOT REPORTED     Mode NOT REPORTED     FIO2 NOT REPORTED     Pt Temp NOT REPORTED     POC pH Temp NOT REPORTED     POC pCO2 Temp NOT REPORTED mm Hg    POC pO2 Temp NOT REPORTED mm Hg   Creatinine W/GFR Point of Care   Result Value Ref Range    POC Creatinine 0.88 0.51 - 1.19 mg/dL    GFR Comment >60 >60 mL/min    GFR Non-African American >60 >60 mL/min    GFR Comment         Lactic Acid, POC   Result Value Ref Range    POC Lactic Acid 2.13 (H) 0.56 - 1.39 mmol/L   POCT Glucose   Result Value Ref Range    POC Glucose 265 (H) 74 - 100 mg/dL   Anion Gap (Calc) POC   Result Value Ref Range    Anion Gap 3 (L) 7 - 16 mmol/L       IMPRESSION: CHF    RADIOLOGY:  Xr Chest Standard (2 Vw)    Result Date: 9/12/2018  EXAMINATION: TWO VIEWS OF THE CHEST 9/12/2018 12:03 pm COMPARISON: Radiographs from September 5, 2018 HISTORY: ORDERING SYSTEM PROVIDED HISTORY: chest pain, rhonchi to [employer] TECHNOLOGIST PROVIDED HISTORY: chest pain, rhonchi to MEKA FINDINGS: Stable

## 2018-10-10 NOTE — ED NOTES
Patient resting on stretcher and denies any concerns at this time. Will continue to monitor. Meal tray ordered.        Samantha Arora RN  10/10/18 8137

## 2018-10-10 NOTE — PLAN OF CARE
Problem: RESPIRATORY  Intervention: Respiratory assessment  NON INVASIVE VENTILATION  PROVIDE OPTIMAL VENTILATION/ACCEPTABLE SP02  IMPLEMENT NON INVASIVE VENTILATION PROTOCOL  ASSESSMENT SKIN INTEGRITY  PATIENT EDUCATION AS NEEDED  BIPAP AS NEEDED

## 2018-10-11 ENCOUNTER — APPOINTMENT (OUTPATIENT)
Dept: GENERAL RADIOLOGY | Age: 63
DRG: 194 | End: 2018-10-11
Payer: MEDICARE

## 2018-10-11 PROBLEM — B19.20 HEPATITIS C: Status: ACTIVE | Noted: 2018-10-11

## 2018-10-11 PROBLEM — F19.11 HISTORY OF SUBSTANCE ABUSE (HCC): Status: ACTIVE | Noted: 2018-10-11

## 2018-10-11 PROBLEM — I10 HYPERTENSION: Status: ACTIVE | Noted: 2018-10-11

## 2018-10-11 PROBLEM — E87.20 LACTIC ACIDOSIS: Status: RESOLVED | Noted: 2018-08-11 | Resolved: 2018-10-11

## 2018-10-11 LAB
-: NORMAL
ABSOLUTE EOS #: 0.12 K/UL (ref 0–0.4)
ABSOLUTE IMMATURE GRANULOCYTE: 0.94 K/UL (ref 0–0.3)
ABSOLUTE LYMPH #: 0.59 K/UL (ref 1–4.8)
ABSOLUTE MONO #: 0.35 K/UL (ref 0.1–0.8)
ANION GAP SERPL CALCULATED.3IONS-SCNC: 15 MMOL/L (ref 9–17)
ANION GAP SERPL CALCULATED.3IONS-SCNC: 17 MMOL/L (ref 9–17)
BASOPHILS # BLD: 0 % (ref 0–2)
BASOPHILS ABSOLUTE: 0 K/UL (ref 0–0.2)
BNP INTERPRETATION: NORMAL
BUN BLDV-MCNC: 24 MG/DL (ref 8–23)
BUN BLDV-MCNC: 30 MG/DL (ref 8–23)
BUN/CREAT BLD: ABNORMAL (ref 9–20)
BUN/CREAT BLD: ABNORMAL (ref 9–20)
CALCIUM SERPL-MCNC: 9.1 MG/DL (ref 8.6–10.4)
CALCIUM SERPL-MCNC: 9.2 MG/DL (ref 8.6–10.4)
CHLORIDE BLD-SCNC: 95 MMOL/L (ref 98–107)
CHLORIDE BLD-SCNC: 95 MMOL/L (ref 98–107)
CO2: 26 MMOL/L (ref 20–31)
CO2: 26 MMOL/L (ref 20–31)
CREAT SERPL-MCNC: 1.13 MG/DL (ref 0.5–0.9)
CREAT SERPL-MCNC: 1.26 MG/DL (ref 0.5–0.9)
DIFFERENTIAL TYPE: ABNORMAL
DIRECT EXAM: NORMAL
EKG ATRIAL RATE: 116 BPM
EKG ATRIAL RATE: 129 BPM
EKG P AXIS: 59 DEGREES
EKG P AXIS: 63 DEGREES
EKG P-R INTERVAL: 126 MS
EKG P-R INTERVAL: 138 MS
EKG Q-T INTERVAL: 310 MS
EKG Q-T INTERVAL: 334 MS
EKG QRS DURATION: 66 MS
EKG QRS DURATION: 68 MS
EKG QTC CALCULATION (BAZETT): 454 MS
EKG QTC CALCULATION (BAZETT): 464 MS
EKG R AXIS: 37 DEGREES
EKG R AXIS: 39 DEGREES
EKG T AXIS: 31 DEGREES
EKG T AXIS: 70 DEGREES
EKG VENTRICULAR RATE: 116 BPM
EKG VENTRICULAR RATE: 129 BPM
EOSINOPHILS RELATIVE PERCENT: 1 % (ref 1–4)
GFR AFRICAN AMERICAN: 52 ML/MIN
GFR AFRICAN AMERICAN: 59 ML/MIN
GFR NON-AFRICAN AMERICAN: 43 ML/MIN
GFR NON-AFRICAN AMERICAN: 48 ML/MIN
GFR SERPL CREATININE-BSD FRML MDRD: ABNORMAL ML/MIN/{1.73_M2}
GLUCOSE BLD-MCNC: 169 MG/DL (ref 65–105)
GLUCOSE BLD-MCNC: 224 MG/DL (ref 65–105)
GLUCOSE BLD-MCNC: 329 MG/DL (ref 70–99)
GLUCOSE BLD-MCNC: 398 MG/DL (ref 65–105)
GLUCOSE BLD-MCNC: 401 MG/DL (ref 70–99)
GLUCOSE BLD-MCNC: 437 MG/DL (ref 65–105)
HCT VFR BLD CALC: 41.2 % (ref 36.3–47.1)
HEMOGLOBIN: 13.3 G/DL (ref 11.9–15.1)
IMMATURE GRANULOCYTES: 8 %
LYMPHOCYTES # BLD: 5 % (ref 24–44)
Lab: NORMAL
MAGNESIUM: 1.9 MG/DL (ref 1.6–2.6)
MCH RBC QN AUTO: 34.5 PG (ref 25.2–33.5)
MCHC RBC AUTO-ENTMCNC: 32.3 G/DL (ref 28.4–34.8)
MCV RBC AUTO: 106.7 FL (ref 82.6–102.9)
MONOCYTES # BLD: 3 % (ref 1–7)
MORPHOLOGY: ABNORMAL
NRBC AUTOMATED: 0.2 PER 100 WBC
PDW BLD-RTO: 15 % (ref 11.8–14.4)
PLATELET # BLD: 458 K/UL (ref 138–453)
PLATELET ESTIMATE: ABNORMAL
PMV BLD AUTO: 9 FL (ref 8.1–13.5)
POTASSIUM SERPL-SCNC: 5.2 MMOL/L (ref 3.7–5.3)
POTASSIUM SERPL-SCNC: 5.8 MMOL/L (ref 3.7–5.3)
PRO-BNP: 105 PG/ML
RBC # BLD: 3.86 M/UL (ref 3.95–5.11)
RBC # BLD: ABNORMAL 10*6/UL
REASON FOR REJECTION: NORMAL
SEG NEUTROPHILS: 83 % (ref 36–66)
SEGMENTED NEUTROPHILS ABSOLUTE COUNT: 9.7 K/UL (ref 1.8–7.7)
SODIUM BLD-SCNC: 136 MMOL/L (ref 135–144)
SODIUM BLD-SCNC: 138 MMOL/L (ref 135–144)
SPECIMEN DESCRIPTION: NORMAL
STATUS: NORMAL
WBC # BLD: 11.7 K/UL (ref 3.5–11.3)
WBC # BLD: ABNORMAL 10*3/UL
ZZ NTE CLEAN UP: ORDERED TEST: NORMAL
ZZ NTE WITH NAME CLEAN UP: SPECIMEN SOURCE: NORMAL

## 2018-10-11 PROCEDURE — 76937 US GUIDE VASCULAR ACCESS: CPT

## 2018-10-11 PROCEDURE — 6370000000 HC RX 637 (ALT 250 FOR IP): Performed by: STUDENT IN AN ORGANIZED HEALTH CARE EDUCATION/TRAINING PROGRAM

## 2018-10-11 PROCEDURE — 6360000002 HC RX W HCPCS: Performed by: STUDENT IN AN ORGANIZED HEALTH CARE EDUCATION/TRAINING PROGRAM

## 2018-10-11 PROCEDURE — 80048 BASIC METABOLIC PNL TOTAL CA: CPT

## 2018-10-11 PROCEDURE — 87205 SMEAR GRAM STAIN: CPT

## 2018-10-11 PROCEDURE — 2700000000 HC OXYGEN THERAPY PER DAY

## 2018-10-11 PROCEDURE — 2580000003 HC RX 258: Performed by: STUDENT IN AN ORGANIZED HEALTH CARE EDUCATION/TRAINING PROGRAM

## 2018-10-11 PROCEDURE — 74019 RADEX ABDOMEN 2 VIEWS: CPT

## 2018-10-11 PROCEDURE — 85025 COMPLETE CBC W/AUTO DIFF WBC: CPT

## 2018-10-11 PROCEDURE — 36415 COLL VENOUS BLD VENIPUNCTURE: CPT

## 2018-10-11 PROCEDURE — 2060000000 HC ICU INTERMEDIATE R&B

## 2018-10-11 PROCEDURE — 82947 ASSAY GLUCOSE BLOOD QUANT: CPT

## 2018-10-11 PROCEDURE — 99223 1ST HOSP IP/OBS HIGH 75: CPT | Performed by: INTERNAL MEDICINE

## 2018-10-11 PROCEDURE — 94640 AIRWAY INHALATION TREATMENT: CPT

## 2018-10-11 PROCEDURE — 83735 ASSAY OF MAGNESIUM: CPT

## 2018-10-11 PROCEDURE — 93005 ELECTROCARDIOGRAM TRACING: CPT

## 2018-10-11 PROCEDURE — 6370000000 HC RX 637 (ALT 250 FOR IP): Performed by: INTERNAL MEDICINE

## 2018-10-11 PROCEDURE — 94762 N-INVAS EAR/PLS OXIMTRY CONT: CPT

## 2018-10-11 PROCEDURE — 87077 CULTURE AEROBIC IDENTIFY: CPT

## 2018-10-11 PROCEDURE — 87070 CULTURE OTHR SPECIMN AEROBIC: CPT

## 2018-10-11 PROCEDURE — 83880 ASSAY OF NATRIURETIC PEPTIDE: CPT

## 2018-10-11 RX ORDER — ACETAMINOPHEN 325 MG/1
650 TABLET ORAL ONCE
Status: COMPLETED | OUTPATIENT
Start: 2018-10-11 | End: 2018-10-11

## 2018-10-11 RX ORDER — DEXTROSE MONOHYDRATE 50 MG/ML
100 INJECTION, SOLUTION INTRAVENOUS PRN
Status: DISCONTINUED | OUTPATIENT
Start: 2018-10-11 | End: 2018-10-12 | Stop reason: HOSPADM

## 2018-10-11 RX ORDER — DEXTROSE MONOHYDRATE 25 G/50ML
12.5 INJECTION, SOLUTION INTRAVENOUS PRN
Status: DISCONTINUED | OUTPATIENT
Start: 2018-10-11 | End: 2018-10-12 | Stop reason: HOSPADM

## 2018-10-11 RX ORDER — NICOTINE POLACRILEX 4 MG
15 LOZENGE BUCCAL PRN
Status: DISCONTINUED | OUTPATIENT
Start: 2018-10-11 | End: 2018-10-12 | Stop reason: HOSPADM

## 2018-10-11 RX ORDER — INSULIN GLARGINE 100 [IU]/ML
20 INJECTION, SOLUTION SUBCUTANEOUS NIGHTLY
Status: DISCONTINUED | OUTPATIENT
Start: 2018-10-11 | End: 2018-10-12 | Stop reason: HOSPADM

## 2018-10-11 RX ORDER — ALBUTEROL SULFATE 2.5 MG/3ML
2.5 SOLUTION RESPIRATORY (INHALATION) EVERY 6 HOURS PRN
Status: DISCONTINUED | OUTPATIENT
Start: 2018-10-11 | End: 2018-10-12 | Stop reason: HOSPADM

## 2018-10-11 RX ORDER — FUROSEMIDE 10 MG/ML
40 INJECTION INTRAMUSCULAR; INTRAVENOUS DAILY
Status: DISCONTINUED | OUTPATIENT
Start: 2018-10-12 | End: 2018-10-12

## 2018-10-11 RX ORDER — BUPRENORPHINE HYDROCHLORIDE AND NALOXONE HYDROCHLORIDE DIHYDRATE 8; 2 MG/1; MG/1
1.5 TABLET SUBLINGUAL DAILY
Status: DISCONTINUED | OUTPATIENT
Start: 2018-10-11 | End: 2018-10-12 | Stop reason: HOSPADM

## 2018-10-11 RX ORDER — DEXTROSE MONOHYDRATE 25 G/50ML
25 INJECTION, SOLUTION INTRAVENOUS ONCE
Status: COMPLETED | OUTPATIENT
Start: 2018-10-11 | End: 2018-10-11

## 2018-10-11 RX ORDER — PREDNISONE 20 MG/1
20 TABLET ORAL DAILY
Status: DISCONTINUED | OUTPATIENT
Start: 2018-10-12 | End: 2018-10-12 | Stop reason: HOSPADM

## 2018-10-11 RX ORDER — IPRATROPIUM BROMIDE AND ALBUTEROL SULFATE 2.5; .5 MG/3ML; MG/3ML
1 SOLUTION RESPIRATORY (INHALATION)
Status: DISCONTINUED | OUTPATIENT
Start: 2018-10-11 | End: 2018-10-12 | Stop reason: HOSPADM

## 2018-10-11 RX ORDER — LEVOFLOXACIN 500 MG/1
500 TABLET, FILM COATED ORAL DAILY
Status: DISCONTINUED | OUTPATIENT
Start: 2018-10-11 | End: 2018-10-12

## 2018-10-11 RX ORDER — METOPROLOL SUCCINATE 50 MG/1
50 TABLET, EXTENDED RELEASE ORAL DAILY
Status: DISCONTINUED | OUTPATIENT
Start: 2018-10-11 | End: 2018-10-12 | Stop reason: HOSPADM

## 2018-10-11 RX ORDER — ALBUTEROL SULFATE 2.5 MG/3ML
2.5 SOLUTION RESPIRATORY (INHALATION) EVERY 4 HOURS PRN
Status: DISCONTINUED | OUTPATIENT
Start: 2018-10-11 | End: 2018-10-11

## 2018-10-11 RX ADMIN — ENOXAPARIN SODIUM 40 MG: 40 INJECTION SUBCUTANEOUS at 09:10

## 2018-10-11 RX ADMIN — METOPROLOL SUCCINATE 50 MG: 50 TABLET, FILM COATED, EXTENDED RELEASE ORAL at 11:05

## 2018-10-11 RX ADMIN — INSULIN LISPRO 18 UNITS: 100 INJECTION, SOLUTION INTRAVENOUS; SUBCUTANEOUS at 16:37

## 2018-10-11 RX ADMIN — IPRATROPIUM BROMIDE AND ALBUTEROL SULFATE 1 AMPULE: .5; 3 SOLUTION RESPIRATORY (INHALATION) at 15:33

## 2018-10-11 RX ADMIN — PREDNISONE 40 MG: 20 TABLET ORAL at 09:08

## 2018-10-11 RX ADMIN — INSULIN GLARGINE 20 UNITS: 100 INJECTION, SOLUTION SUBCUTANEOUS at 21:16

## 2018-10-11 RX ADMIN — GABAPENTIN 300 MG: 300 CAPSULE ORAL at 14:17

## 2018-10-11 RX ADMIN — INSULIN LISPRO 3 UNITS: 100 INJECTION, SOLUTION INTRAVENOUS; SUBCUTANEOUS at 11:05

## 2018-10-11 RX ADMIN — FUROSEMIDE 40 MG: 10 INJECTION, SOLUTION INTRAMUSCULAR; INTRAVENOUS at 09:07

## 2018-10-11 RX ADMIN — ACETAMINOPHEN 650 MG: 325 TABLET ORAL at 00:55

## 2018-10-11 RX ADMIN — MOMETASONE FUROATE AND FORMOTEROL FUMARATE DIHYDRATE 2 PUFF: 200; 5 AEROSOL RESPIRATORY (INHALATION) at 19:50

## 2018-10-11 RX ADMIN — DEXTROSE MONOHYDRATE 25 G: 25 INJECTION, SOLUTION INTRAVENOUS at 09:07

## 2018-10-11 RX ADMIN — ALBUTEROL SULFATE 2.5 MG: 2.5 SOLUTION RESPIRATORY (INHALATION) at 07:44

## 2018-10-11 RX ADMIN — INSULIN HUMAN 10 UNITS: 100 INJECTION, SOLUTION PARENTERAL at 09:08

## 2018-10-11 RX ADMIN — BUPRENORPHINE AND NALOXONE 1.5 TABLET: 8; 2 TABLET SUBLINGUAL at 09:07

## 2018-10-11 RX ADMIN — IPRATROPIUM BROMIDE AND ALBUTEROL SULFATE 1 AMPULE: .5; 3 SOLUTION RESPIRATORY (INHALATION) at 11:23

## 2018-10-11 RX ADMIN — GABAPENTIN 300 MG: 300 CAPSULE ORAL at 09:07

## 2018-10-11 RX ADMIN — LEVOFLOXACIN 500 MG: 500 TABLET, FILM COATED ORAL at 09:07

## 2018-10-11 RX ADMIN — INSULIN LISPRO 7 UNITS: 100 INJECTION, SOLUTION INTRAVENOUS; SUBCUTANEOUS at 20:32

## 2018-10-11 RX ADMIN — MOMETASONE FUROATE AND FORMOTEROL FUMARATE DIHYDRATE 2 PUFF: 200; 5 AEROSOL RESPIRATORY (INHALATION) at 11:21

## 2018-10-11 RX ADMIN — Medication 10 ML: at 20:33

## 2018-10-11 RX ADMIN — GABAPENTIN 300 MG: 300 CAPSULE ORAL at 20:33

## 2018-10-11 RX ADMIN — IPRATROPIUM BROMIDE AND ALBUTEROL SULFATE 1 AMPULE: .5; 3 SOLUTION RESPIRATORY (INHALATION) at 19:49

## 2018-10-11 ASSESSMENT — PAIN SCALES - GENERAL
PAINLEVEL_OUTOF10: 0
PAINLEVEL_OUTOF10: 6
PAINLEVEL_OUTOF10: 5

## 2018-10-11 NOTE — PROGRESS NOTES
Smoking Cessation - topics covered   []  Health Risks  []  Benefits of Quitting   []  Smoking Cessation  []  Patient has no history of tobacco use  []  Patient is former smoker. []  No need for tobacco cessation education. []  Booklet given  [x]  Patient verbalizes understanding. [x]  Patient denies need for tobacco cessation education.   Tracie Speak  1:11 PM

## 2018-10-12 VITALS
OXYGEN SATURATION: 99 % | HEIGHT: 59 IN | WEIGHT: 153.88 LBS | BODY MASS INDEX: 31.02 KG/M2 | TEMPERATURE: 98.2 F | HEART RATE: 100 BPM | SYSTOLIC BLOOD PRESSURE: 145 MMHG | DIASTOLIC BLOOD PRESSURE: 83 MMHG | RESPIRATION RATE: 18 BRPM

## 2018-10-12 PROBLEM — R73.9 HYPERGLYCEMIA: Status: RESOLVED | Noted: 2018-08-11 | Resolved: 2018-10-12

## 2018-10-12 PROBLEM — J96.01 ACUTE RESPIRATORY FAILURE WITH HYPOXIA (HCC): Status: ACTIVE | Noted: 2018-10-12

## 2018-10-12 LAB
ABSOLUTE EOS #: 0 K/UL (ref 0–0.4)
ABSOLUTE IMMATURE GRANULOCYTE: 0.6 K/UL (ref 0–0.3)
ABSOLUTE LYMPH #: 1.8 K/UL (ref 1–4.8)
ABSOLUTE MONO #: 1.35 K/UL (ref 0.1–0.8)
ANION GAP SERPL CALCULATED.3IONS-SCNC: 13 MMOL/L (ref 9–17)
BASOPHILS # BLD: 0 % (ref 0–2)
BASOPHILS ABSOLUTE: 0 K/UL (ref 0–0.2)
BUN BLDV-MCNC: 27 MG/DL (ref 8–23)
BUN/CREAT BLD: ABNORMAL (ref 9–20)
CALCIUM SERPL-MCNC: 9.7 MG/DL (ref 8.6–10.4)
CHLORIDE BLD-SCNC: 95 MMOL/L (ref 98–107)
CO2: 32 MMOL/L (ref 20–31)
CREAT SERPL-MCNC: 0.89 MG/DL (ref 0.5–0.9)
DIFFERENTIAL TYPE: ABNORMAL
EKG ATRIAL RATE: 115 BPM
EKG P AXIS: 68 DEGREES
EKG P-R INTERVAL: 140 MS
EKG Q-T INTERVAL: 328 MS
EKG QRS DURATION: 68 MS
EKG QTC CALCULATION (BAZETT): 453 MS
EKG R AXIS: 54 DEGREES
EKG T AXIS: 56 DEGREES
EKG VENTRICULAR RATE: 115 BPM
EOSINOPHILS RELATIVE PERCENT: 0 % (ref 1–4)
FOLATE: 19.4 NG/ML
GFR AFRICAN AMERICAN: >60 ML/MIN
GFR NON-AFRICAN AMERICAN: >60 ML/MIN
GFR SERPL CREATININE-BSD FRML MDRD: ABNORMAL ML/MIN/{1.73_M2}
GFR SERPL CREATININE-BSD FRML MDRD: ABNORMAL ML/MIN/{1.73_M2}
GLUCOSE BLD-MCNC: 134 MG/DL (ref 70–99)
GLUCOSE BLD-MCNC: 153 MG/DL (ref 65–105)
GLUCOSE BLD-MCNC: 164 MG/DL (ref 65–105)
GLUCOSE BLD-MCNC: 249 MG/DL (ref 65–105)
HCT VFR BLD CALC: 40.5 % (ref 36.3–47.1)
HEMOGLOBIN: 12.9 G/DL (ref 11.9–15.1)
IMMATURE GRANULOCYTES: 4 %
LYMPHOCYTES # BLD: 12 % (ref 24–44)
MAGNESIUM: 1.9 MG/DL (ref 1.6–2.6)
MCH RBC QN AUTO: 33.8 PG (ref 25.2–33.5)
MCHC RBC AUTO-ENTMCNC: 31.9 G/DL (ref 28.4–34.8)
MCV RBC AUTO: 106 FL (ref 82.6–102.9)
MONOCYTES # BLD: 9 % (ref 1–7)
MORPHOLOGY: ABNORMAL
NRBC AUTOMATED: 0.1 PER 100 WBC
PDW BLD-RTO: 14.9 % (ref 11.8–14.4)
PLATELET # BLD: 468 K/UL (ref 138–453)
PLATELET ESTIMATE: ABNORMAL
PMV BLD AUTO: 9.4 FL (ref 8.1–13.5)
POTASSIUM SERPL-SCNC: 4.5 MMOL/L (ref 3.7–5.3)
RBC # BLD: 3.82 M/UL (ref 3.95–5.11)
RBC # BLD: ABNORMAL 10*6/UL
SEG NEUTROPHILS: 75 % (ref 36–66)
SEGMENTED NEUTROPHILS ABSOLUTE COUNT: 11.25 K/UL (ref 1.8–7.7)
SODIUM BLD-SCNC: 140 MMOL/L (ref 135–144)
TROPONIN INTERP: NORMAL
TROPONIN INTERP: NORMAL
TROPONIN T: <0.03 NG/ML
TROPONIN T: <0.03 NG/ML
VITAMIN B-12: 452 PG/ML (ref 232–1245)
WBC # BLD: 15 K/UL (ref 3.5–11.3)
WBC # BLD: ABNORMAL 10*3/UL

## 2018-10-12 PROCEDURE — 6370000000 HC RX 637 (ALT 250 FOR IP): Performed by: INTERNAL MEDICINE

## 2018-10-12 PROCEDURE — 82947 ASSAY GLUCOSE BLOOD QUANT: CPT

## 2018-10-12 PROCEDURE — 80048 BASIC METABOLIC PNL TOTAL CA: CPT

## 2018-10-12 PROCEDURE — 84484 ASSAY OF TROPONIN QUANT: CPT

## 2018-10-12 PROCEDURE — 94761 N-INVAS EAR/PLS OXIMETRY MLT: CPT

## 2018-10-12 PROCEDURE — 94640 AIRWAY INHALATION TREATMENT: CPT

## 2018-10-12 PROCEDURE — 99239 HOSP IP/OBS DSCHRG MGMT >30: CPT | Performed by: INTERNAL MEDICINE

## 2018-10-12 PROCEDURE — 93005 ELECTROCARDIOGRAM TRACING: CPT

## 2018-10-12 PROCEDURE — 6360000002 HC RX W HCPCS: Performed by: INTERNAL MEDICINE

## 2018-10-12 PROCEDURE — 6370000000 HC RX 637 (ALT 250 FOR IP): Performed by: STUDENT IN AN ORGANIZED HEALTH CARE EDUCATION/TRAINING PROGRAM

## 2018-10-12 PROCEDURE — 83735 ASSAY OF MAGNESIUM: CPT

## 2018-10-12 PROCEDURE — 2700000000 HC OXYGEN THERAPY PER DAY

## 2018-10-12 PROCEDURE — 36415 COLL VENOUS BLD VENIPUNCTURE: CPT

## 2018-10-12 PROCEDURE — 6360000002 HC RX W HCPCS: Performed by: STUDENT IN AN ORGANIZED HEALTH CARE EDUCATION/TRAINING PROGRAM

## 2018-10-12 PROCEDURE — 85025 COMPLETE CBC W/AUTO DIFF WBC: CPT

## 2018-10-12 RX ORDER — CHOLECALCIFEROL (VITAMIN D3) 125 MCG
250 CAPSULE ORAL DAILY
Status: DISCONTINUED | OUTPATIENT
Start: 2018-10-12 | End: 2018-10-12 | Stop reason: HOSPADM

## 2018-10-12 RX ORDER — LEVOFLOXACIN 500 MG/1
500 TABLET, FILM COATED ORAL DAILY
Status: DISCONTINUED | OUTPATIENT
Start: 2018-10-12 | End: 2018-10-12 | Stop reason: HOSPADM

## 2018-10-12 RX ORDER — THIAMINE MONONITRATE (VIT B1) 100 MG
100 TABLET ORAL DAILY
Status: DISCONTINUED | OUTPATIENT
Start: 2018-10-12 | End: 2018-10-12 | Stop reason: HOSPADM

## 2018-10-12 RX ORDER — DOCUSATE SODIUM 100 MG/1
100 CAPSULE, LIQUID FILLED ORAL 2 TIMES DAILY
Qty: 60 CAPSULE | Refills: 2 | Status: ON HOLD | OUTPATIENT
Start: 2018-10-12 | End: 2019-06-19 | Stop reason: ALTCHOICE

## 2018-10-12 RX ORDER — PREDNISONE 20 MG/1
20 TABLET ORAL DAILY
Qty: 10 TABLET | Refills: 0 | Status: ON HOLD | OUTPATIENT
Start: 2018-10-13 | End: 2018-10-25 | Stop reason: HOSPADM

## 2018-10-12 RX ORDER — FOLIC ACID 1 MG/1
1 TABLET ORAL DAILY
Qty: 30 TABLET | Refills: 3 | Status: SHIPPED | OUTPATIENT
Start: 2018-10-13 | End: 2021-08-18

## 2018-10-12 RX ORDER — FUROSEMIDE 40 MG/1
40 TABLET ORAL DAILY
Status: DISCONTINUED | OUTPATIENT
Start: 2018-10-12 | End: 2018-10-12 | Stop reason: HOSPADM

## 2018-10-12 RX ORDER — FUROSEMIDE 40 MG/1
40 TABLET ORAL DAILY
Qty: 60 TABLET | Refills: 3 | Status: ON HOLD | OUTPATIENT
Start: 2018-10-12 | End: 2018-11-13 | Stop reason: HOSPADM

## 2018-10-12 RX ORDER — MULTIVITAMIN WITH FOLIC ACID 400 MCG
1 TABLET ORAL DAILY
Status: DISCONTINUED | OUTPATIENT
Start: 2018-10-12 | End: 2018-10-12 | Stop reason: HOSPADM

## 2018-10-12 RX ORDER — LANOLIN ALCOHOL/MO/W.PET/CERES
100 CREAM (GRAM) TOPICAL DAILY
Qty: 30 TABLET | Refills: 3 | Status: SHIPPED | OUTPATIENT
Start: 2018-10-13

## 2018-10-12 RX ORDER — FOLIC ACID 1 MG/1
1 TABLET ORAL DAILY
Status: DISCONTINUED | OUTPATIENT
Start: 2018-10-12 | End: 2018-10-12 | Stop reason: HOSPADM

## 2018-10-12 RX ORDER — ACETAMINOPHEN 325 MG/1
650 TABLET ORAL EVERY 4 HOURS PRN
Status: DISCONTINUED | OUTPATIENT
Start: 2018-10-12 | End: 2018-10-12 | Stop reason: HOSPADM

## 2018-10-12 RX ADMIN — IPRATROPIUM BROMIDE AND ALBUTEROL SULFATE 1 AMPULE: .5; 3 SOLUTION RESPIRATORY (INHALATION) at 08:55

## 2018-10-12 RX ADMIN — THERA TABS 1 TABLET: TAB at 09:34

## 2018-10-12 RX ADMIN — FOLIC ACID 1 MG: 1 TABLET ORAL at 09:34

## 2018-10-12 RX ADMIN — MOMETASONE FUROATE AND FORMOTEROL FUMARATE DIHYDRATE 2 PUFF: 200; 5 AEROSOL RESPIRATORY (INHALATION) at 08:57

## 2018-10-12 RX ADMIN — ENOXAPARIN SODIUM 40 MG: 40 INJECTION SUBCUTANEOUS at 08:11

## 2018-10-12 RX ADMIN — ACETAMINOPHEN 650 MG: 325 TABLET ORAL at 05:39

## 2018-10-12 RX ADMIN — GABAPENTIN 300 MG: 300 CAPSULE ORAL at 08:11

## 2018-10-12 RX ADMIN — PREDNISONE 20 MG: 20 TABLET ORAL at 08:11

## 2018-10-12 RX ADMIN — IPRATROPIUM BROMIDE AND ALBUTEROL SULFATE 1 AMPULE: .5; 3 SOLUTION RESPIRATORY (INHALATION) at 11:31

## 2018-10-12 RX ADMIN — METOPROLOL SUCCINATE 50 MG: 50 TABLET, FILM COATED, EXTENDED RELEASE ORAL at 08:11

## 2018-10-12 RX ADMIN — BUPRENORPHINE AND NALOXONE 1.5 TABLET: 8; 2 TABLET SUBLINGUAL at 08:12

## 2018-10-12 RX ADMIN — FUROSEMIDE 40 MG: 10 INJECTION, SOLUTION INTRAMUSCULAR; INTRAVENOUS at 08:11

## 2018-10-12 RX ADMIN — LEVOFLOXACIN 500 MG: 500 TABLET, FILM COATED ORAL at 08:17

## 2018-10-12 RX ADMIN — Medication 100 MG: at 09:34

## 2018-10-12 RX ADMIN — INSULIN LISPRO 6 UNITS: 100 INJECTION, SOLUTION INTRAVENOUS; SUBCUTANEOUS at 11:55

## 2018-10-12 RX ADMIN — INSULIN LISPRO 3 UNITS: 100 INJECTION, SOLUTION INTRAVENOUS; SUBCUTANEOUS at 08:11

## 2018-10-12 RX ADMIN — Medication 250 MCG: at 09:34

## 2018-10-12 ASSESSMENT — PAIN SCALES - GENERAL
PAINLEVEL_OUTOF10: 3
PAINLEVEL_OUTOF10: 6
PAINLEVEL_OUTOF10: 0

## 2018-10-12 NOTE — PROGRESS NOTES
Home Oxygen Evaluation    Home Oxygen Evaluation completed. Patient is on 2 liters per minute via nasal cannula.   Resting SpO2 = 93%  Resting SpO2 on room air = 88%    SpO2 on room air with exercise = n/a  SpO2 on oxygen as above with exercise = n/a        Miles Del Angel  12:05 PM

## 2018-10-12 NOTE — PROGRESS NOTES
CLINICAL PHARMACY NOTE: MEDS TO [address] Select Patient?: No  Total # of Prescriptions Filled: 5   The following medications were delivered to the patient:  · FOLIC ACID 1MG  · FUROSEMIDE 40MG  · PREDNISONE 20MG  · DOK 100MG  · VITAMIN B-1 100MG  Total # of Interventions Completed: 1  Time Spent (min): 75    Additional Documentation:I TRIED TO DELIVER MEDICATIONS TO PATIENTS ROOM BUT PATIENT WAS ALREADY GONE AND THE NURSE SAID THE PATIENT DID NOT WANT TO WAIT AND THAT SHE WAS GOING TO PICK THEM UP AT THE PHARMACY. SHE ALSO HAD 1 THAT WE HAVE TO ORDER IN FOR Monday. VIT B-12. I TRIED TO CALL THE PATIENT TO SEE IF WE SHOULD DELIVER THEM BUT THE NUMBER WE HAVE IS NOT ACTIVELY WORKING. MEDS ARE HANGING IN  AREA [facility].

## 2018-10-12 NOTE — PROGRESS NOTES
PATIENT REFUSES TO WEAR BIPAP     [x] Risks and benefits explained to patient   [x] Patient refuses to wear Bipap stating I really dont want to  [x] Patient verbalizes understanding of information presented.

## 2018-10-14 LAB
CULTURE: ABNORMAL
DIRECT EXAM: ABNORMAL
Lab: ABNORMAL
SPECIMEN DESCRIPTION: ABNORMAL
STATUS: ABNORMAL

## 2018-10-15 NOTE — DISCHARGE SUMMARY
10:50am , Hospital follow up appt    Thomas Rothman  [address]  [phone]            Follow up labs: none    Follow up imaging: none    Note that over 30 minutes was spent in preparing discharge papers, discussing discharge with patient, medication review, etc.      Deepa Baeza MD   PGY 3 Internal Medicine Resident   [facility]

## 2018-10-23 ENCOUNTER — TELEPHONE (OUTPATIENT)
Dept: OTHER | Facility: CLINIC | Age: 63
End: 2018-10-23

## 2018-10-23 ENCOUNTER — HOSPITAL ENCOUNTER (INPATIENT)
Age: 63
LOS: 2 days | Discharge: HOME | DRG: 140 | End: 2018-10-25
Attending: EMERGENCY MEDICINE | Admitting: INTERNAL MEDICINE
Payer: MEDICARE

## 2018-10-23 ENCOUNTER — APPOINTMENT (OUTPATIENT)
Dept: GENERAL RADIOLOGY | Age: 63
DRG: 140 | End: 2018-10-23
Payer: MEDICARE

## 2018-10-23 DIAGNOSIS — R07.9 CHEST PAIN, UNSPECIFIED TYPE: ICD-10-CM

## 2018-10-23 DIAGNOSIS — I50.9 ACUTE ON CHRONIC CONGESTIVE HEART FAILURE, UNSPECIFIED HEART FAILURE TYPE (HCC): ICD-10-CM

## 2018-10-23 DIAGNOSIS — I50.42 CHRONIC HEART FAILURE WITH REDUCED EJECTION FRACTION AND DIASTOLIC DYSFUNCTION (HCC): Primary | ICD-10-CM

## 2018-10-23 LAB
ABSOLUTE EOS #: 0.13 K/UL (ref 0–0.44)
ABSOLUTE IMMATURE GRANULOCYTE: 0.27 K/UL (ref 0–0.3)
ABSOLUTE LYMPH #: 2.01 K/UL (ref 1.1–3.7)
ABSOLUTE MONO #: 1.11 K/UL (ref 0.1–1.2)
ALBUMIN SERPL-MCNC: 3.4 G/DL (ref 3.5–5.2)
ALBUMIN/GLOBULIN RATIO: 0.9 (ref 1–2.5)
ALLEN TEST: ABNORMAL
ALP BLD-CCNC: 97 U/L (ref 35–104)
ALT SERPL-CCNC: 26 U/L (ref 5–33)
ANION GAP SERPL CALCULATED.3IONS-SCNC: 12 MMOL/L (ref 9–17)
ANION GAP: 8 MMOL/L (ref 7–16)
AST SERPL-CCNC: 61 U/L
BASOPHILS # BLD: 0 % (ref 0–2)
BASOPHILS ABSOLUTE: 0.05 K/UL (ref 0–0.2)
BILIRUB SERPL-MCNC: 0.58 MG/DL (ref 0.3–1.2)
BNP INTERPRETATION: ABNORMAL
BUN BLDV-MCNC: 13 MG/DL (ref 8–23)
BUN/CREAT BLD: ABNORMAL (ref 9–20)
CALCIUM IONIZED: 1.12 MMOL/L (ref 1.13–1.33)
CALCIUM SERPL-MCNC: 9.1 MG/DL (ref 8.6–10.4)
CHLORIDE BLD-SCNC: 97 MMOL/L (ref 98–107)
CO2: 29 MMOL/L (ref 20–31)
CREAT SERPL-MCNC: 0.89 MG/DL (ref 0.5–0.9)
DIFFERENTIAL TYPE: ABNORMAL
EOSINOPHILS RELATIVE PERCENT: 1 % (ref 1–4)
FIO2: ABNORMAL
GFR AFRICAN AMERICAN: >60 ML/MIN
GFR NON-AFRICAN AMERICAN: 58 ML/MIN
GFR NON-AFRICAN AMERICAN: >60 ML/MIN
GFR SERPL CREATININE-BSD FRML MDRD: >60 ML/MIN
GFR SERPL CREATININE-BSD FRML MDRD: ABNORMAL ML/MIN/{1.73_M2}
GLUCOSE BLD-MCNC: 135 MG/DL (ref 70–99)
GLUCOSE BLD-MCNC: 137 MG/DL (ref 74–100)
HCO3 VENOUS: 27.4 MMOL/L (ref 22–29)
HCT VFR BLD CALC: 42 % (ref 36.3–47.1)
HEMOGLOBIN: 13.2 G/DL (ref 11.9–15.1)
IMMATURE GRANULOCYTES: 2 %
INR BLD: 0.9
LACTIC ACID, WHOLE BLOOD: 1.4 MMOL/L (ref 0.7–2.1)
LYMPHOCYTES # BLD: 16 % (ref 24–43)
MAGNESIUM: 1.8 MG/DL (ref 1.6–2.6)
MCH RBC QN AUTO: 33.9 PG (ref 25.2–33.5)
MCHC RBC AUTO-ENTMCNC: 31.4 G/DL (ref 28.4–34.8)
MCV RBC AUTO: 108 FL (ref 82.6–102.9)
MODE: ABNORMAL
MONOCYTES # BLD: 9 % (ref 3–12)
NEGATIVE BASE EXCESS, VEN: ABNORMAL (ref 0–2)
NRBC AUTOMATED: 0.6 PER 100 WBC
O2 DEVICE/FLOW/%: ABNORMAL
O2 SAT, VEN: 79 % (ref 60–85)
PARTIAL THROMBOPLASTIN TIME: 21.5 SEC (ref 20.5–30.5)
PATIENT TEMP: ABNORMAL
PCO2, VEN: 39.6 MM HG (ref 41–51)
PDW BLD-RTO: 14.8 % (ref 11.8–14.4)
PH VENOUS: 7.45 (ref 7.32–7.43)
PLATELET # BLD: 503 K/UL (ref 138–453)
PLATELET ESTIMATE: ABNORMAL
PMV BLD AUTO: 9.2 FL (ref 8.1–13.5)
PO2, VEN: 41.1 MM HG (ref 30–50)
POC CHLORIDE: 105 MMOL/L (ref 98–107)
POC CREATININE: 0.96 MG/DL (ref 0.51–1.19)
POC HEMATOCRIT: 34 % (ref 36–46)
POC HEMOGLOBIN: 11.6 G/DL (ref 12–16)
POC IONIZED CALCIUM: 1.02 MMOL/L (ref 1.15–1.33)
POC LACTIC ACID: 1.17 MMOL/L (ref 0.56–1.39)
POC PCO2 TEMP: ABNORMAL MM HG
POC PH TEMP: ABNORMAL
POC PO2 TEMP: ABNORMAL MM HG
POC POTASSIUM: 3.5 MMOL/L (ref 3.5–4.5)
POC SODIUM: 140 MMOL/L (ref 138–146)
POC TROPONIN I: 0 NG/ML (ref 0–0.1)
POC TROPONIN I: 0 NG/ML (ref 0–0.1)
POC TROPONIN INTERP: NORMAL
POC TROPONIN INTERP: NORMAL
POSITIVE BASE EXCESS, VEN: 3 (ref 0–3)
POTASSIUM SERPL-SCNC: 4.4 MMOL/L (ref 3.7–5.3)
PRO-BNP: 330 PG/ML
PROTHROMBIN TIME: 9.4 SEC (ref 9–12)
RBC # BLD: 3.89 M/UL (ref 3.95–5.11)
RBC # BLD: ABNORMAL 10*6/UL
SAMPLE SITE: ABNORMAL
SEG NEUTROPHILS: 72 % (ref 36–65)
SEGMENTED NEUTROPHILS ABSOLUTE COUNT: 8.89 K/UL (ref 1.5–8.1)
SODIUM BLD-SCNC: 138 MMOL/L (ref 135–144)
TOTAL CO2, VENOUS: 29 MMOL/L (ref 23–30)
TOTAL PROTEIN: 7.2 G/DL (ref 6.4–8.3)
WBC # BLD: 12.5 K/UL (ref 3.5–11.3)
WBC # BLD: ABNORMAL 10*3/UL

## 2018-10-23 PROCEDURE — 85025 COMPLETE CBC W/AUTO DIFF WBC: CPT

## 2018-10-23 PROCEDURE — 99285 EMERGENCY DEPT VISIT HI MDM: CPT

## 2018-10-23 PROCEDURE — 82803 BLOOD GASES ANY COMBINATION: CPT

## 2018-10-23 PROCEDURE — 82565 ASSAY OF CREATININE: CPT

## 2018-10-23 PROCEDURE — 82947 ASSAY GLUCOSE BLOOD QUANT: CPT

## 2018-10-23 PROCEDURE — 83880 ASSAY OF NATRIURETIC PEPTIDE: CPT

## 2018-10-23 PROCEDURE — 2580000003 HC RX 258: Performed by: STUDENT IN AN ORGANIZED HEALTH CARE EDUCATION/TRAINING PROGRAM

## 2018-10-23 PROCEDURE — 85014 HEMATOCRIT: CPT

## 2018-10-23 PROCEDURE — 96376 TX/PRO/DX INJ SAME DRUG ADON: CPT

## 2018-10-23 PROCEDURE — 83605 ASSAY OF LACTIC ACID: CPT

## 2018-10-23 PROCEDURE — 83735 ASSAY OF MAGNESIUM: CPT

## 2018-10-23 PROCEDURE — 84132 ASSAY OF SERUM POTASSIUM: CPT

## 2018-10-23 PROCEDURE — 1200000000 HC SEMI PRIVATE

## 2018-10-23 PROCEDURE — 99223 1ST HOSP IP/OBS HIGH 75: CPT | Performed by: INTERNAL MEDICINE

## 2018-10-23 PROCEDURE — 96374 THER/PROPH/DIAG INJ IV PUSH: CPT

## 2018-10-23 PROCEDURE — 82330 ASSAY OF CALCIUM: CPT

## 2018-10-23 PROCEDURE — 6360000002 HC RX W HCPCS: Performed by: STUDENT IN AN ORGANIZED HEALTH CARE EDUCATION/TRAINING PROGRAM

## 2018-10-23 PROCEDURE — 71045 X-RAY EXAM CHEST 1 VIEW: CPT

## 2018-10-23 PROCEDURE — 84295 ASSAY OF SERUM SODIUM: CPT

## 2018-10-23 PROCEDURE — 96375 TX/PRO/DX INJ NEW DRUG ADDON: CPT

## 2018-10-23 PROCEDURE — 85610 PROTHROMBIN TIME: CPT

## 2018-10-23 PROCEDURE — 6370000000 HC RX 637 (ALT 250 FOR IP): Performed by: STUDENT IN AN ORGANIZED HEALTH CARE EDUCATION/TRAINING PROGRAM

## 2018-10-23 PROCEDURE — 93005 ELECTROCARDIOGRAM TRACING: CPT

## 2018-10-23 PROCEDURE — 85730 THROMBOPLASTIN TIME PARTIAL: CPT

## 2018-10-23 PROCEDURE — 80053 COMPREHEN METABOLIC PANEL: CPT

## 2018-10-23 PROCEDURE — 84484 ASSAY OF TROPONIN QUANT: CPT

## 2018-10-23 PROCEDURE — 82435 ASSAY OF BLOOD CHLORIDE: CPT

## 2018-10-23 RX ORDER — PANTOPRAZOLE SODIUM 40 MG/1
40 TABLET, DELAYED RELEASE ORAL
Status: DISCONTINUED | OUTPATIENT
Start: 2018-10-24 | End: 2018-10-25 | Stop reason: HOSPADM

## 2018-10-23 RX ORDER — M-VIT,TX,IRON,MINS/CALC/FOLIC 27MG-0.4MG
1 TABLET ORAL DAILY
Status: DISCONTINUED | OUTPATIENT
Start: 2018-10-24 | End: 2018-10-25 | Stop reason: HOSPADM

## 2018-10-23 RX ORDER — LISINOPRIL 10 MG/1
10 TABLET ORAL DAILY
Status: DISCONTINUED | OUTPATIENT
Start: 2018-10-24 | End: 2018-10-25 | Stop reason: HOSPADM

## 2018-10-23 RX ORDER — GUAIFENESIN 600 MG/1
600 TABLET, EXTENDED RELEASE ORAL 2 TIMES DAILY
Status: DISCONTINUED | OUTPATIENT
Start: 2018-10-23 | End: 2018-10-25 | Stop reason: HOSPADM

## 2018-10-23 RX ORDER — SODIUM CHLORIDE 0.9 % (FLUSH) 0.9 %
10 SYRINGE (ML) INJECTION EVERY 12 HOURS SCHEDULED
Status: DISCONTINUED | OUTPATIENT
Start: 2018-10-23 | End: 2018-10-25 | Stop reason: HOSPADM

## 2018-10-23 RX ORDER — METHYLPREDNISOLONE SODIUM SUCCINATE 40 MG/ML
40 INJECTION, POWDER, LYOPHILIZED, FOR SOLUTION INTRAMUSCULAR; INTRAVENOUS DAILY
Status: DISCONTINUED | OUTPATIENT
Start: 2018-10-24 | End: 2018-10-24

## 2018-10-23 RX ORDER — ACETAMINOPHEN 500 MG
500 TABLET ORAL EVERY 8 HOURS PRN
Status: DISCONTINUED | OUTPATIENT
Start: 2018-10-23 | End: 2018-10-23 | Stop reason: SDUPTHER

## 2018-10-23 RX ORDER — HYDROCHLOROTHIAZIDE 12.5 MG/1
12.5 CAPSULE, GELATIN COATED ORAL EVERY MORNING
Status: DISCONTINUED | OUTPATIENT
Start: 2018-10-24 | End: 2018-10-25 | Stop reason: HOSPADM

## 2018-10-23 RX ORDER — PROMETHAZINE HYDROCHLORIDE 25 MG/ML
12.5 INJECTION, SOLUTION INTRAMUSCULAR; INTRAVENOUS ONCE
Status: COMPLETED | OUTPATIENT
Start: 2018-10-23 | End: 2018-10-23

## 2018-10-23 RX ORDER — SODIUM CHLORIDE 0.9 % (FLUSH) 0.9 %
10 SYRINGE (ML) INJECTION PRN
Status: DISCONTINUED | OUTPATIENT
Start: 2018-10-23 | End: 2018-10-25 | Stop reason: HOSPADM

## 2018-10-23 RX ORDER — THIAMINE MONONITRATE (VIT B1) 100 MG
100 TABLET ORAL DAILY
Status: DISCONTINUED | OUTPATIENT
Start: 2018-10-24 | End: 2018-10-25 | Stop reason: HOSPADM

## 2018-10-23 RX ORDER — FENTANYL CITRATE 50 UG/ML
50 INJECTION, SOLUTION INTRAMUSCULAR; INTRAVENOUS ONCE
Status: COMPLETED | OUTPATIENT
Start: 2018-10-23 | End: 2018-10-23

## 2018-10-23 RX ORDER — BUPRENORPHINE HYDROCHLORIDE AND NALOXONE HYDROCHLORIDE DIHYDRATE 8; 2 MG/1; MG/1
1.5 TABLET SUBLINGUAL DAILY
Status: DISCONTINUED | OUTPATIENT
Start: 2018-10-24 | End: 2018-10-25 | Stop reason: HOSPADM

## 2018-10-23 RX ORDER — FUROSEMIDE 40 MG/1
40 TABLET ORAL DAILY
Status: DISCONTINUED | OUTPATIENT
Start: 2018-10-24 | End: 2018-10-24

## 2018-10-23 RX ORDER — ONDANSETRON 2 MG/ML
4 INJECTION INTRAMUSCULAR; INTRAVENOUS ONCE
Status: COMPLETED | OUTPATIENT
Start: 2018-10-23 | End: 2018-10-23

## 2018-10-23 RX ORDER — NITROGLYCERIN 0.4 MG/1
0.4 TABLET SUBLINGUAL EVERY 5 MIN PRN
Status: DISCONTINUED | OUTPATIENT
Start: 2018-10-23 | End: 2018-10-23

## 2018-10-23 RX ORDER — ONDANSETRON 2 MG/ML
4 INJECTION INTRAMUSCULAR; INTRAVENOUS EVERY 6 HOURS PRN
Status: DISCONTINUED | OUTPATIENT
Start: 2018-10-23 | End: 2018-10-25 | Stop reason: HOSPADM

## 2018-10-23 RX ORDER — LEVOFLOXACIN 5 MG/ML
750 INJECTION, SOLUTION INTRAVENOUS EVERY 24 HOURS
Status: DISCONTINUED | OUTPATIENT
Start: 2018-10-23 | End: 2018-10-24

## 2018-10-23 RX ORDER — FOLIC ACID 1 MG/1
1 TABLET ORAL DAILY
Status: DISCONTINUED | OUTPATIENT
Start: 2018-10-24 | End: 2018-10-25 | Stop reason: HOSPADM

## 2018-10-23 RX ORDER — MORPHINE SULFATE 4 MG/ML
4 INJECTION, SOLUTION INTRAMUSCULAR; INTRAVENOUS ONCE
Status: COMPLETED | OUTPATIENT
Start: 2018-10-23 | End: 2018-10-23

## 2018-10-23 RX ORDER — DOCUSATE SODIUM 100 MG/1
100 CAPSULE, LIQUID FILLED ORAL 2 TIMES DAILY
Status: DISCONTINUED | OUTPATIENT
Start: 2018-10-23 | End: 2018-10-25 | Stop reason: HOSPADM

## 2018-10-23 RX ORDER — METOPROLOL SUCCINATE 50 MG/1
50 TABLET, EXTENDED RELEASE ORAL DAILY
Status: DISCONTINUED | OUTPATIENT
Start: 2018-10-24 | End: 2018-10-24

## 2018-10-23 RX ORDER — ONDANSETRON 4 MG/1
4 TABLET, ORALLY DISINTEGRATING ORAL EVERY 8 HOURS PRN
Status: DISCONTINUED | OUTPATIENT
Start: 2018-10-23 | End: 2018-10-25 | Stop reason: HOSPADM

## 2018-10-23 RX ORDER — CHOLECALCIFEROL (VITAMIN D3) 125 MCG
250 CAPSULE ORAL DAILY
Status: DISCONTINUED | OUTPATIENT
Start: 2018-10-24 | End: 2018-10-25 | Stop reason: HOSPADM

## 2018-10-23 RX ORDER — GABAPENTIN 300 MG/1
300 CAPSULE ORAL 3 TIMES DAILY
Status: DISCONTINUED | OUTPATIENT
Start: 2018-10-23 | End: 2018-10-25 | Stop reason: HOSPADM

## 2018-10-23 RX ORDER — ACETAMINOPHEN 325 MG/1
650 TABLET ORAL EVERY 4 HOURS PRN
Status: DISCONTINUED | OUTPATIENT
Start: 2018-10-23 | End: 2018-10-25 | Stop reason: HOSPADM

## 2018-10-23 RX ORDER — IPRATROPIUM BROMIDE AND ALBUTEROL SULFATE 2.5; .5 MG/3ML; MG/3ML
1 SOLUTION RESPIRATORY (INHALATION)
Status: DISCONTINUED | OUTPATIENT
Start: 2018-10-24 | End: 2018-10-25 | Stop reason: HOSPADM

## 2018-10-23 RX ADMIN — PROMETHAZINE HYDROCHLORIDE 12.5 MG: 25 INJECTION INTRAMUSCULAR; INTRAVENOUS at 13:24

## 2018-10-23 RX ADMIN — ONDANSETRON 4 MG: 2 INJECTION INTRAMUSCULAR; INTRAVENOUS at 15:44

## 2018-10-23 RX ADMIN — PROMETHAZINE HYDROCHLORIDE 12.5 MG: 25 INJECTION, SOLUTION INTRAMUSCULAR; INTRAVENOUS at 19:34

## 2018-10-23 RX ADMIN — Medication 10 ML: at 23:30

## 2018-10-23 RX ADMIN — NITROGLYCERIN 0.4 MG: 0.4 TABLET SUBLINGUAL at 14:16

## 2018-10-23 RX ADMIN — MORPHINE SULFATE 4 MG: 4 INJECTION, SOLUTION INTRAMUSCULAR; INTRAVENOUS at 19:34

## 2018-10-23 RX ADMIN — FENTANYL CITRATE 50 MCG: 50 INJECTION INTRAMUSCULAR; INTRAVENOUS at 13:24

## 2018-10-23 ASSESSMENT — PAIN DESCRIPTION - LOCATION
LOCATION: HEAD;BACK
LOCATION: CHEST
LOCATION: BACK

## 2018-10-23 ASSESSMENT — PAIN DESCRIPTION - ORIENTATION: ORIENTATION: RIGHT

## 2018-10-23 ASSESSMENT — ENCOUNTER SYMPTOMS
ABDOMINAL PAIN: 0
SINUS PAIN: 0
SORE THROAT: 0
BLURRED VISION: 0
SPUTUM PRODUCTION: 1
VOMITING: 0
SHORTNESS OF BREATH: 1
BLOOD IN STOOL: 0
NAUSEA: 0
ORTHOPNEA: 1
WHEEZING: 0
PHOTOPHOBIA: 0
COUGH: 1
HEARTBURN: 0

## 2018-10-23 ASSESSMENT — PAIN SCALES - GENERAL
PAINLEVEL_OUTOF10: 7
PAINLEVEL_OUTOF10: 7
PAINLEVEL_OUTOF10: 0
PAINLEVEL_OUTOF10: 8
PAINLEVEL_OUTOF10: 5
PAINLEVEL_OUTOF10: 7
PAINLEVEL_OUTOF10: 8

## 2018-10-23 ASSESSMENT — PAIN DESCRIPTION - DESCRIPTORS: DESCRIPTORS: PRESSURE

## 2018-10-23 ASSESSMENT — HEART SCORE: ECG: 0

## 2018-10-23 ASSESSMENT — PAIN DESCRIPTION - PAIN TYPE
TYPE: CHRONIC PAIN
TYPE: ACUTE PAIN

## 2018-10-23 NOTE — ED NOTES
Pt resting on cot, on monitor, updated on plan of care, requesting water and nausea meds. Resident notified.       Patricio Clark RN  10/23/18 012

## 2018-10-23 NOTE — ED PROVIDER NOTES
Social History Main Topics    Smoking status: Former Smoker     Packs/day: 1.00     Years: 40.00     Types: Cigarettes    Smokeless tobacco: Never Used    Alcohol use No    Drug use: No      Comment: past history    Sexual activity: Not on file     Other Topics Concern    Not on file     Social History Narrative    No narrative on file     Family History History reviewed. No pertinent family history. Allergies: Aspirin; Keflet [cephalexin]; and Motrin [ibuprofen]    Home Medications:  Prior to Admission medications    Medication Sig Start Date End Date Taking? Authorizing Provider   folic acid (FOLVITE) 1 MG tablet Take 1 tablet by mouth daily 10/13/18   Jeri Mcmillan MD   furosemide (LASIX) 40 MG tablet Take 1 tablet by mouth daily 10/12/18   Jeri Mcmillan MD   predniSONE (DELTASONE) 20 MG tablet Take 1 tablet by mouth daily for 10 days 10/13/18 10/23/18  Jeri Mcmillan MD   vitamin B-1 100 MG tablet Take 1 tablet by mouth daily 10/13/18   Jeri Mcmillan MD   vitamin B-12 250 MCG tablet Take 1 tablet by mouth daily 10/13/18   Jeri Mcmillan MD   docusate sodium (COLACE) 100 MG capsule Take 1 capsule by mouth 2 times daily 10/12/18   Jeri Mcmillan MD   gabapentin (NEURONTIN) 300 MG capsule Take 300 mg by mouth 3 times daily. Isiah Leonard Historical Provider, MD   buprenorphine-naloxone (SUBOXONE) 12-3 MG sublingual film Place 1 Film under the tongue daily. Isiah Leonard     Historical Provider, MD   pantoprazole (PROTONIX) 40 MG tablet Take 1 tablet by mouth every morning (before breakfast) 9/14/18   Shazia Whitehead MD   guaiFENesin (MUCINEX) 600 MG extended release tablet Take 1 tablet by mouth 2 times daily 9/13/18   Shazia Whitehead MD   metoprolol succinate (TOPROL XL) 50 MG extended release tablet Take 1 tablet by mouth daily 9/14/18   Shazia Whitehead MD   hydrochlorothiazide (MICROZIDE) 12.5 MG capsule Take 12.5 mg by mouth every morning    Historical Provider, MD   albuterol low risk for spontaneous coronary artery dissection in women between 40 and 55 as the patient does not have connective tissue disorders, inflammatory disorders, or recent pregnancy. Hypertension DDX:   HTN evaluate (acute EOD -- brain, renal, thoracic aorta, lung, kidney, eyes)   (renal dx, vascular lesion, drugs (MAO inhibitor + tyramine, steroids, cocaine, amphetamines)    Lightheaded/ Dizzy DDX:   Vertigo: Peripheral (BPPV, labyrinthitis, Meniere's) Central: (MS, Acoustic neuroma, carotid artery dissection). Lightheaded: Serious (cardiac valve/ arrhytmia, anemia, low glucose, infection), Common (orthostatic, nonspecific)     Shortness of Breath DDX:   sob/wheezing/cough evaluate for COPD exacerbation (home O2, PNA, hospitalization), asthma (past intubation, hospitalization, tobacco history), Pneumothorax, anaphylaxis, anxiety, PE (FH, OCP, tobacco use, BMI, immobilization, recent surgery, cancer, past DVT/ PE), pericardial effusion, CHF, ACS/MI, atelectasis, lower airway obstruction, aspiration, sudden onset, fever, cough, leg swelling. Syncope/ Pre-syncope DDX:   cardiac arrhythmia/ valvular, low glucose, anemia, SAH, dissection, PE, AAA rupture, ectopic pregnancy rupture, seizure, vasovagal, orthostatic    Sinus Tachycardia DDX:  Fever, hypovolemia, hypotension with shock, sepsis, anemia, hypoxia, PE, ACS, AMI, pain, anxiety, pheochromocytoma, hyperthyroidism, decompensated heart failure, pulmonary disease, sympathomimetics, anticholinergics, illicit drug use, beta blocker withdrawal, alcohol withdrawl    Diagnostic Results     LABS:  No results found for this visit on 10/23/18.     RADIOLOGY:  XR CHEST PORTABLE    (Results Pending)       EKG Interpretation  Rate: tachycardia 122 beats per minute  Rhythm: sinus tachycardia  Axis: normal  Ectopy: none  Intervals: normal with good R-wave progression  Enlargements:  no enlargements are seen  ST Segments: no acute change  T Waves: no acute change  Q Waves:

## 2018-10-23 NOTE — ED NOTES
Bed: 34  Expected date:   Expected time:   Means of arrival:   Comments:  [id number], RN  10/23/18 0710

## 2018-10-23 NOTE — H&P
Results   Component Value Date     10/23/2018    K 4.4 10/23/2018    CL 97 10/23/2018    CO2 29 10/23/2018    BUN 13 10/23/2018    LABALBU 3.4 10/23/2018    LABALBU 4.4 11/14/2011    CREATININE 0.96 10/23/2018    CREATININE 0.89 10/23/2018    CALCIUM 9.1 10/23/2018    GFRAA >60 10/23/2018    LABGLOM 58 10/23/2018    GLUCOSE 135 10/23/2018    GLUCOSE 76 05/04/2012       IMPRESSION  This is a 70 y.o. female who presented with shortness of breath, productive cough and found to have COPD exacerberation. Patient requires admit to inpatient status because of need for in-hospital treatment of COPD. ASSESSMENT/PLAN:  1. COPD Exacerberation : IV Steroids, RT and breathing treatments. Dulera and Duoneb  2. Chronic diastolic Heart failure: , Lasix 40 mg OD IV. Fluid restriction < 1800mL. I/O and weights  3. Hypertension, controlled: Resume Lisinopril 10, HCT 12.5, Metoprolol 50 mg.  4. Narcotic Dependence: Sub-oxone SL tablets daily  5. Chronic Hep-C:  ?Ascites, LFTs normal, Hepatitis panel in 2017 NR except for Hep-C. F/U USG Abdomen    Cardiac diet, Salt < 2 g and 1800 fluid restriction  Up as tolerated with PT/OT  Lovenox 40 mg SC daily for DVT prophylaxis     Saumya Triplett MD  PGY-1 Internal Medicine Resident  [address] [place] [address]  10/23/2018 10:18 PM      IM Attending    Pt seen and examined 10/24  I have discussed the care of pt , including pertinent history and exam findings,  with the resident. I have reviewed the key elements of all parts of the encounter with the resident. I agree with the assessment, plan and orders as documented by the resident.     Electronically signed by Gracie Rivera MD

## 2018-10-23 NOTE — ED PROVIDER NOTES
[facility] ED     Emergency Department     Faculty Attestation    I performed a history and physical examination of the patient and discussed management with the resident. I reviewed the residents note and agree with the documented findings and plan of care. Any areas of disagreement are noted on the chart. I was personally present for the key portions of any procedures. I have documented in the chart those procedures where I was not present during the key portions. I have reviewed the emergency nurses triage note. I agree with the chief complaint, past medical history, past surgical history, allergies, medications, social and family history as documented unless otherwise noted below. For Physician Assistant/ Nurse Practitioner cases/documentation I have personally evaluated this patient and have completed at least one if not all key elements of the E/M (history, physical exam, and MDM). Additional findings are as noted. Patient here with respiratory distress shortness of breath. History of CHF and COPD. Admits to medication noncompliance with her Lasix is because a friend \"told may be I shouldn't take it. \"  On exam much improved with supplemental oxygen here resting comfortably in the bed now distress has resolved. Lungs diffuse rales throughout but speaking in full sentence his. Heart regular but tachycardic in the 110s. Equal pulses. 2+ edema bilaterally no asymmetry. We'll check labs EKG chest x-ray, probable admission. EKG interpretation: Sinus tachycardia 122. Normal intervals. Normal axis. No acute ST or T changes.   Normal EKG except for rate      Critical Care     none    Fermin Louise MD, Tom Fernandez  Attending Emergency  Physician             Fermin Louise MD  10/23/18 0556

## 2018-10-24 ENCOUNTER — APPOINTMENT (OUTPATIENT)
Dept: ULTRASOUND IMAGING | Age: 63
DRG: 140 | End: 2018-10-24
Payer: MEDICARE

## 2018-10-24 PROBLEM — I50.42 CHRONIC HEART FAILURE WITH REDUCED EJECTION FRACTION AND DIASTOLIC DYSFUNCTION (HCC): Status: ACTIVE | Noted: 2018-10-23

## 2018-10-24 PROBLEM — G93.40 ACUTE ENCEPHALOPATHY: Status: ACTIVE | Noted: 2018-10-24

## 2018-10-24 PROBLEM — J44.1 DECOMPENSATED COPD WITH EXACERBATION (CHRONIC OBSTRUCTIVE PULMONARY DISEASE) (HCC): Status: ACTIVE | Noted: 2018-10-24

## 2018-10-24 LAB
-: ABNORMAL
ABSOLUTE EOS #: 0.16 K/UL (ref 0–0.44)
ABSOLUTE IMMATURE GRANULOCYTE: 0.2 K/UL (ref 0–0.3)
ABSOLUTE LYMPH #: 1.35 K/UL (ref 1.1–3.7)
ABSOLUTE MONO #: 1.19 K/UL (ref 0.1–1.2)
ALLEN TEST: ABNORMAL
AMORPHOUS: ABNORMAL
AMPHETAMINE SCREEN URINE: NEGATIVE
ANION GAP SERPL CALCULATED.3IONS-SCNC: 11 MMOL/L (ref 9–17)
BACTERIA: ABNORMAL
BARBITURATE SCREEN URINE: NEGATIVE
BASOPHILS # BLD: 0 % (ref 0–2)
BASOPHILS ABSOLUTE: 0.03 K/UL (ref 0–0.2)
BENZODIAZEPINE SCREEN, URINE: NEGATIVE
BILIRUBIN URINE: ABNORMAL
BUN BLDV-MCNC: 12 MG/DL (ref 8–23)
BUN/CREAT BLD: ABNORMAL (ref 9–20)
BUPRENORPHINE URINE: ABNORMAL
CALCIUM SERPL-MCNC: 8.8 MG/DL (ref 8.6–10.4)
CANNABINOID SCREEN URINE: NEGATIVE
CASTS UA: ABNORMAL /LPF (ref 0–8)
CHLORIDE BLD-SCNC: 99 MMOL/L (ref 98–107)
CO2: 25 MMOL/L (ref 20–31)
COCAINE METABOLITE, URINE: NEGATIVE
COLOR: ABNORMAL
CREAT SERPL-MCNC: 0.77 MG/DL (ref 0.5–0.9)
CRYSTALS, UA: ABNORMAL /HPF
DIFFERENTIAL TYPE: ABNORMAL
DIRECT EXAM: NORMAL
EKG ATRIAL RATE: 122 BPM
EKG P AXIS: 66 DEGREES
EKG P-R INTERVAL: 142 MS
EKG Q-T INTERVAL: 328 MS
EKG QRS DURATION: 66 MS
EKG QTC CALCULATION (BAZETT): 467 MS
EKG R AXIS: 46 DEGREES
EKG T AXIS: 54 DEGREES
EKG VENTRICULAR RATE: 122 BPM
EOSINOPHILS RELATIVE PERCENT: 1 % (ref 1–4)
EPITHELIAL CELLS UA: ABNORMAL /HPF (ref 0–5)
FIO2: 3.5
GFR AFRICAN AMERICAN: >60 ML/MIN
GFR NON-AFRICAN AMERICAN: >60 ML/MIN
GFR SERPL CREATININE-BSD FRML MDRD: ABNORMAL ML/MIN/{1.73_M2}
GFR SERPL CREATININE-BSD FRML MDRD: ABNORMAL ML/MIN/{1.73_M2}
GLUCOSE BLD-MCNC: 106 MG/DL (ref 70–99)
GLUCOSE URINE: NEGATIVE
HCT VFR BLD CALC: 40.8 % (ref 36.3–47.1)
HEMOGLOBIN: 13 G/DL (ref 11.9–15.1)
IMMATURE GRANULOCYTES: 2 %
KETONES, URINE: ABNORMAL
LEUKOCYTE ESTERASE, URINE: ABNORMAL
LYMPHOCYTES # BLD: 11 % (ref 24–43)
Lab: NORMAL
MCH RBC QN AUTO: 33.8 PG (ref 25.2–33.5)
MCHC RBC AUTO-ENTMCNC: 31.9 G/DL (ref 28.4–34.8)
MCV RBC AUTO: 106 FL (ref 82.6–102.9)
MDMA URINE: ABNORMAL
METHADONE SCREEN, URINE: NEGATIVE
METHAMPHETAMINE, URINE: ABNORMAL
MODE: ABNORMAL
MONOCYTES # BLD: 10 % (ref 3–12)
MUCUS: ABNORMAL
NEGATIVE BASE EXCESS, ART: ABNORMAL (ref 0–2)
NITRITE, URINE: NEGATIVE
NRBC AUTOMATED: 0.5 PER 100 WBC
O2 DEVICE/FLOW/%: ABNORMAL
OPIATES, URINE: POSITIVE
OTHER OBSERVATIONS UA: ABNORMAL
OXYCODONE SCREEN URINE: NEGATIVE
PATIENT TEMP: ABNORMAL
PDW BLD-RTO: 14.8 % (ref 11.8–14.4)
PH UA: 8 (ref 5–8)
PHENCYCLIDINE, URINE: NEGATIVE
PLATELET # BLD: 448 K/UL (ref 138–453)
PLATELET ESTIMATE: ABNORMAL
PMV BLD AUTO: 9.2 FL (ref 8.1–13.5)
POC HCO3: 28.5 MMOL/L (ref 21–28)
POC O2 SATURATION: 95 % (ref 94–98)
POC PCO2 TEMP: ABNORMAL MM HG
POC PCO2: 49.8 MM HG (ref 35–48)
POC PH TEMP: ABNORMAL
POC PH: 7.37 (ref 7.35–7.45)
POC PO2 TEMP: ABNORMAL MM HG
POC PO2: 77.2 MM HG (ref 83–108)
POSITIVE BASE EXCESS, ART: 2 (ref 0–3)
POTASSIUM SERPL-SCNC: 4.3 MMOL/L (ref 3.7–5.3)
PROPOXYPHENE, URINE: ABNORMAL
PROTEIN UA: ABNORMAL
RBC # BLD: 3.85 M/UL (ref 3.95–5.11)
RBC # BLD: ABNORMAL 10*6/UL
RBC UA: ABNORMAL /HPF (ref 0–4)
RENAL EPITHELIAL, UA: ABNORMAL /HPF
SAMPLE SITE: ABNORMAL
SEG NEUTROPHILS: 76 % (ref 36–65)
SEGMENTED NEUTROPHILS ABSOLUTE COUNT: 9.58 K/UL (ref 1.5–8.1)
SODIUM BLD-SCNC: 135 MMOL/L (ref 135–144)
SPECIFIC GRAVITY UA: 1.02 (ref 1–1.03)
SPECIMEN DESCRIPTION: NORMAL
STATUS: NORMAL
TCO2 (CALC), ART: 30 MMOL/L (ref 22–29)
TEST INFORMATION: ABNORMAL
TRICHOMONAS: ABNORMAL
TRICYCLIC ANTIDEPRESSANTS, UR: ABNORMAL
TURBIDITY: CLEAR
URINE HGB: NEGATIVE
UROBILINOGEN, URINE: ABNORMAL
WBC # BLD: 12.5 K/UL (ref 3.5–11.3)
WBC # BLD: ABNORMAL 10*3/UL
WBC UA: ABNORMAL /HPF (ref 0–5)
YEAST: ABNORMAL

## 2018-10-24 PROCEDURE — 6360000002 HC RX W HCPCS: Performed by: STUDENT IN AN ORGANIZED HEALTH CARE EDUCATION/TRAINING PROGRAM

## 2018-10-24 PROCEDURE — 80307 DRUG TEST PRSMV CHEM ANLYZR: CPT

## 2018-10-24 PROCEDURE — 1200000000 HC SEMI PRIVATE

## 2018-10-24 PROCEDURE — 85025 COMPLETE CBC W/AUTO DIFF WBC: CPT

## 2018-10-24 PROCEDURE — 2700000000 HC OXYGEN THERAPY PER DAY

## 2018-10-24 PROCEDURE — 6370000000 HC RX 637 (ALT 250 FOR IP): Performed by: STUDENT IN AN ORGANIZED HEALTH CARE EDUCATION/TRAINING PROGRAM

## 2018-10-24 PROCEDURE — 82803 BLOOD GASES ANY COMBINATION: CPT

## 2018-10-24 PROCEDURE — 76705 ECHO EXAM OF ABDOMEN: CPT

## 2018-10-24 PROCEDURE — 87070 CULTURE OTHR SPECIMN AEROBIC: CPT

## 2018-10-24 PROCEDURE — 36415 COLL VENOUS BLD VENIPUNCTURE: CPT

## 2018-10-24 PROCEDURE — 87205 SMEAR GRAM STAIN: CPT

## 2018-10-24 PROCEDURE — 87086 URINE CULTURE/COLONY COUNT: CPT

## 2018-10-24 PROCEDURE — 76937 US GUIDE VASCULAR ACCESS: CPT

## 2018-10-24 PROCEDURE — 81001 URINALYSIS AUTO W/SCOPE: CPT

## 2018-10-24 PROCEDURE — 2580000003 HC RX 258: Performed by: STUDENT IN AN ORGANIZED HEALTH CARE EDUCATION/TRAINING PROGRAM

## 2018-10-24 PROCEDURE — 80048 BASIC METABOLIC PNL TOTAL CA: CPT

## 2018-10-24 PROCEDURE — 36600 WITHDRAWAL OF ARTERIAL BLOOD: CPT

## 2018-10-24 PROCEDURE — 94640 AIRWAY INHALATION TREATMENT: CPT

## 2018-10-24 PROCEDURE — 87077 CULTURE AEROBIC IDENTIFY: CPT

## 2018-10-24 PROCEDURE — 90792 PSYCH DIAG EVAL W/MED SRVCS: CPT | Performed by: NURSE PRACTITIONER

## 2018-10-24 RX ORDER — METHYLPREDNISOLONE SODIUM SUCCINATE 40 MG/ML
40 INJECTION, POWDER, LYOPHILIZED, FOR SOLUTION INTRAMUSCULAR; INTRAVENOUS DAILY
Status: DISCONTINUED | OUTPATIENT
Start: 2018-10-25 | End: 2018-10-25

## 2018-10-24 RX ORDER — KETOROLAC TROMETHAMINE 30 MG/ML
30 INJECTION, SOLUTION INTRAMUSCULAR; INTRAVENOUS ONCE
Status: DISCONTINUED | OUTPATIENT
Start: 2018-10-24 | End: 2018-10-25 | Stop reason: HOSPADM

## 2018-10-24 RX ORDER — METHYLPREDNISOLONE SODIUM SUCCINATE 40 MG/ML
40 INJECTION, POWDER, LYOPHILIZED, FOR SOLUTION INTRAMUSCULAR; INTRAVENOUS EVERY 12 HOURS
Status: DISCONTINUED | OUTPATIENT
Start: 2018-10-24 | End: 2018-10-24

## 2018-10-24 RX ORDER — QUETIAPINE FUMARATE 25 MG/1
25 TABLET, FILM COATED ORAL ONCE
Status: DISCONTINUED | OUTPATIENT
Start: 2018-10-24 | End: 2018-10-24

## 2018-10-24 RX ORDER — MAGNESIUM SULFATE 1 G/100ML
1 INJECTION INTRAVENOUS
Status: DISPENSED | OUTPATIENT
Start: 2018-10-24 | End: 2018-10-24

## 2018-10-24 RX ORDER — ALBUTEROL SULFATE 2.5 MG/3ML
2.5 SOLUTION RESPIRATORY (INHALATION)
Status: DISCONTINUED | OUTPATIENT
Start: 2018-10-24 | End: 2018-10-25 | Stop reason: HOSPADM

## 2018-10-24 RX ORDER — MAGNESIUM SULFATE 1 G/100ML
2 INJECTION INTRAVENOUS
Status: ACTIVE | OUTPATIENT
Start: 2018-10-24 | End: 2018-10-24

## 2018-10-24 RX ORDER — FUROSEMIDE 10 MG/ML
40 INJECTION INTRAMUSCULAR; INTRAVENOUS DAILY
Status: DISCONTINUED | OUTPATIENT
Start: 2018-10-24 | End: 2018-10-25 | Stop reason: HOSPADM

## 2018-10-24 RX ORDER — CARVEDILOL 12.5 MG/1
12.5 TABLET ORAL 2 TIMES DAILY WITH MEALS
Status: DISCONTINUED | OUTPATIENT
Start: 2018-10-24 | End: 2018-10-25 | Stop reason: HOSPADM

## 2018-10-24 RX ORDER — OXYCODONE HYDROCHLORIDE AND ACETAMINOPHEN 5; 325 MG/1; MG/1
1 TABLET ORAL EVERY 4 HOURS PRN
Status: DISCONTINUED | OUTPATIENT
Start: 2018-10-24 | End: 2018-10-24

## 2018-10-24 RX ORDER — FUROSEMIDE 10 MG/ML
40 INJECTION INTRAMUSCULAR; INTRAVENOUS DAILY
Status: DISCONTINUED | OUTPATIENT
Start: 2018-10-24 | End: 2018-10-24

## 2018-10-24 RX ORDER — FUROSEMIDE 40 MG/1
40 TABLET ORAL DAILY
Status: DISCONTINUED | OUTPATIENT
Start: 2018-10-24 | End: 2018-10-24

## 2018-10-24 RX ORDER — HALOPERIDOL 5 MG/ML
2 INJECTION INTRAMUSCULAR EVERY 6 HOURS PRN
Status: DISCONTINUED | OUTPATIENT
Start: 2018-10-24 | End: 2018-10-25 | Stop reason: HOSPADM

## 2018-10-24 RX ORDER — HALOPERIDOL 5 MG/ML
2 INJECTION INTRAMUSCULAR ONCE
Status: COMPLETED | OUTPATIENT
Start: 2018-10-24 | End: 2018-10-24

## 2018-10-24 RX ORDER — MORPHINE SULFATE 4 MG/ML
2 INJECTION, SOLUTION INTRAMUSCULAR; INTRAVENOUS ONCE
Status: DISCONTINUED | OUTPATIENT
Start: 2018-10-24 | End: 2018-10-24

## 2018-10-24 RX ORDER — LEVOFLOXACIN 5 MG/ML
500 INJECTION, SOLUTION INTRAVENOUS EVERY 24 HOURS
Status: DISCONTINUED | OUTPATIENT
Start: 2018-10-25 | End: 2018-10-25 | Stop reason: CLARIF

## 2018-10-24 RX ADMIN — Medication 10 ML: at 08:58

## 2018-10-24 RX ADMIN — GUAIFENESIN 600 MG: 600 TABLET, EXTENDED RELEASE ORAL at 08:57

## 2018-10-24 RX ADMIN — LISINOPRIL 10 MG: 10 TABLET ORAL at 08:57

## 2018-10-24 RX ADMIN — MOMETASONE FUROATE AND FORMOTEROL FUMARATE DIHYDRATE 2 PUFF: 100; 5 AEROSOL RESPIRATORY (INHALATION) at 08:11

## 2018-10-24 RX ADMIN — MAGNESIUM SULFATE HEPTAHYDRATE 1 G: 1 INJECTION, SOLUTION INTRAVENOUS at 15:23

## 2018-10-24 RX ADMIN — MULTIPLE VITAMINS W/ MINERALS TAB 1 TABLET: TAB at 08:58

## 2018-10-24 RX ADMIN — HALOPERIDOL LACTATE 2 MG: 5 INJECTION, SOLUTION INTRAMUSCULAR at 06:42

## 2018-10-24 RX ADMIN — GABAPENTIN 300 MG: 300 CAPSULE ORAL at 14:06

## 2018-10-24 RX ADMIN — CARVEDILOL 12.5 MG: 12.5 TABLET, FILM COATED ORAL at 14:28

## 2018-10-24 RX ADMIN — IPRATROPIUM BROMIDE AND ALBUTEROL SULFATE 1 AMPULE: .5; 3 SOLUTION RESPIRATORY (INHALATION) at 16:51

## 2018-10-24 RX ADMIN — LEVOFLOXACIN 750 MG: 5 INJECTION, SOLUTION INTRAVENOUS at 00:27

## 2018-10-24 RX ADMIN — FUROSEMIDE 40 MG: 10 INJECTION, SOLUTION INTRAMUSCULAR; INTRAVENOUS at 11:17

## 2018-10-24 RX ADMIN — HYDROCHLOROTHIAZIDE 12.5 MG: 12.5 CAPSULE ORAL at 08:56

## 2018-10-24 RX ADMIN — FOLIC ACID 1 MG: 1 TABLET ORAL at 08:57

## 2018-10-24 RX ADMIN — BUPRENORPHINE AND NALOXONE 1.5 TABLET: 8; 2 TABLET SUBLINGUAL at 09:06

## 2018-10-24 RX ADMIN — Medication 250 MCG: at 08:57

## 2018-10-24 RX ADMIN — IPRATROPIUM BROMIDE AND ALBUTEROL SULFATE 1 AMPULE: .5; 3 SOLUTION RESPIRATORY (INHALATION) at 08:11

## 2018-10-24 RX ADMIN — METHYLPREDNISOLONE SODIUM SUCCINATE 40 MG: 40 INJECTION, POWDER, FOR SOLUTION INTRAMUSCULAR; INTRAVENOUS at 11:17

## 2018-10-24 RX ADMIN — DOCUSATE SODIUM 100 MG: 100 CAPSULE, LIQUID FILLED ORAL at 08:57

## 2018-10-24 RX ADMIN — GABAPENTIN 300 MG: 300 CAPSULE ORAL at 08:57

## 2018-10-24 RX ADMIN — GABAPENTIN 300 MG: 300 CAPSULE ORAL at 00:27

## 2018-10-24 RX ADMIN — MAGNESIUM SULFATE HEPTAHYDRATE 1 G: 1 INJECTION, SOLUTION INTRAVENOUS at 12:42

## 2018-10-24 RX ADMIN — PANTOPRAZOLE SODIUM 40 MG: 40 TABLET, DELAYED RELEASE ORAL at 08:57

## 2018-10-24 RX ADMIN — ENOXAPARIN SODIUM 40 MG: 40 INJECTION SUBCUTANEOUS at 08:58

## 2018-10-24 RX ADMIN — METOPROLOL SUCCINATE 50 MG: 50 TABLET, FILM COATED, EXTENDED RELEASE ORAL at 08:57

## 2018-10-24 RX ADMIN — Medication 100 MG: at 09:00

## 2018-10-24 RX ADMIN — GUAIFENESIN 600 MG: 600 TABLET, EXTENDED RELEASE ORAL at 00:27

## 2018-10-24 RX ADMIN — IPRATROPIUM BROMIDE AND ALBUTEROL SULFATE 1 AMPULE: .5; 3 SOLUTION RESPIRATORY (INHALATION) at 11:27

## 2018-10-24 ASSESSMENT — PAIN SCALES - GENERAL: PAINLEVEL_OUTOF10: 3
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
## 2018-10-24 NOTE — VIRTUAL HEALTH
enlargement    Current Outpatient Psychiatric Medications:  None    Medications:    Current Facility-Administered Medications: ketorolac (TORADOL) injection 30 mg, 30 mg, Intravenous, Oncealbuterol (PROVENTIL) nebulizer solution 2.5 mg, 2.5 mg, Nebulization, As Directed RT PRN(START ON 10/25/2018) pneumococcal polyvalent (PNEUMOVAX 23) inj 0.5 mL, 0.5 mL, Intramuscular, Once(START ON 10/25/2018) influenza quadrivalent split vaccine (FLUZONE;FLUARIX;FLULAVAL;AFLURIA) injection 0.5 mL, 0.5 mL, Intramuscular, Once(START ON 10/25/2018) levofloxacin (LEVAQUIN) 500 MG/100ML infusion 500 mg, 500 mg, Intravenous, B43Lkotobfmgus (COREG) tablet 12.5 mg, 12.5 mg, Oral, BID WCfurosemide (LASIX) injection 40 mg, 40 mg, Intravenous, Dailyhaloperidol lactate (HALDOL) injection 2 mg, 2 mg, Intramuscular, Q6H PRNmagnesium sulfate 1 g in dextrose 5% 100 mL IVPB, 2 g, Intravenous, Q2H(START ON 10/25/2018) methylPREDNISolone sodium (SOLU-MEDROL) injection 40 mg, 40 mg, Intravenous, Dailysodium chloride flush 0.9 % injection 10 mL, 10 mL, Intravenous, 2 times per day sodium chloride flush 0.9 % injection 10 mL, 10 mL, Intravenous, PRNacetaminophen (TYLENOL) tablet 650 mg, 650 mg, Oral, Q4H PRNenoxaparin (LOVENOX) injection 40 mg, 40 mg, Subcutaneous, Dailyondansetron (ZOFRAN-ODT) disintegrating tablet 4 mg, 4 mg, Oral, Q8H PRNbuprenorphine-naloxone (SUBOXONE) 8-2 MG SL tablet 1.5 tablet, 1.5 tablet, Sublingual, Dailydocusate sodium (COLACE) capsule 100 mg, 855 mg, Oral, BIDfolic acid (FOLVITE) tablet 1 mg, 1 mg, Oral, Dailygabapentin (NEURONTIN) capsule 300 mg, 300 mg, Oral, TIDguaiFENesin (MUCINEX) extended release tablet 600 mg, 600 mg, Oral, BIDhydrochlorothiazide (MICROZIDE) capsule 12.5 mg, 12.5 mg, Oral, QAMlisinopril (PRINIVIL;ZESTRIL) tablet 10 mg, 10 mg, Oral, Dailytherapeutic multivitamin-minerals 1 tablet, 1 tablet, Oral, Dailypantoprazole (PROTONIX) tablet 40 mg, 40 mg, Oral, QAM ACvitamin B-1 (THIAMINE) tablet 100 mg, 100

## 2018-10-24 NOTE — PROGRESS NOTES
improve) 8/31/18   Bella Vo, APRN - CNP   ipratropium-albuterol (DUONEB) 0.5-2.5 (3) MG/3ML SOLN nebulizer solution Inhale 3 mLs into the lungs every 4 hours as needed for Shortness of Breath 8/12/18   Julio Arreguin MD   Multiple Vitamins-Minerals (THERAPEUTIC MULTIVITAMIN-MINERALS) tablet Take 1 tablet by mouth daily 8/12/18   Julio Arreguin MD       ALLERGIES      Aspirin; Charyl Rosnebaum [cephalexin]; and Motrin [ibuprofen]    SOCIAL HISTORY       reports that she has quit smoking. Her smoking use included Cigarettes. She has a 40.00 pack-year smoking history. She has never used smokeless tobacco. She reports that she does not drink alcohol or use drugs. FAMILY HISTORY      family history is not on file. REVIEW OF SYSTEMS      · Constitutional: fever and chills  · Eyes: Negative for visual changes, diplopia, scleral icterus. · ENT: Negative for Headaches, hearing loss, vertigo, mouth sores, sore throat. · Cardiovascular: Negative for lightheadedness/orthostatic symptoms ,chest pain, dyspnea on exertion, palpitations or loss of consciousness. · Respiratory: cough, nasal congestion and dyspnea  · Gastrointestinal: Negative for nausea/vomiting, change in bowel habits, abdominal pain, dysphagia/appetite loss, hematemesis, blood in stools. · Genitourinary:Negative for change in bladder habits, dysuria, trouble voiding, hematuria. · Musculoskeletal: Negative for gait disturbance, weakness, joint complaints. · Integumentary: Negative for rash, pruritis. · Neurological: Negative for headache, dizziness, change in muscle strength, numbness/tingling, change in gait, balance, coordination, .     PHYSICAL EXAM      BP (!) 158/93   Pulse 114   Temp 97.9 °F (36.6 °C) (Oral)   Resp 24   Ht 4' 11\" (1.499 m)   Wt 140 lb 14.4 oz (63.9 kg)   LMP 03/18/2015   SpO2 97%   BMI 28.46 kg/m²      General appearance - alert, well appearing, and in no distress  Eyes - pupils equal and reactive, extraocular eye movements intact   Mouth - mucous membranes moist, pharynx normal without lesions   Neck - supple, no significant adenopathy   Chest -mildly decreased breath sounds throughout both lung fields and occasional scattered expiratory wheezes  Heart - normal rate, regular rhythm, normal S1, S2, no murmurs, rubs, clicks or gallops   Abdomen - soft, nontender, nondistended, no masses or organomegaly   Neurological - alert, oriented, normal speech, no focal findings or movement disorder noted   Extremities - peripheral pulses normal, no pedal edema, no clubbing or cyanosis   Skin - normal coloration and turgor, no rashes, no suspicious skin lesions noted         DIAGNOSTICS      Laboratory Testing:  CBC:   Recent Labs      10/24/18   0416   WBC  12.5*   HGB  13.0   PLT  448     BMP:    Recent Labs      10/23/18   1340  10/23/18   1350  10/24/18   0416   NA  138   --   135   K  4.4   --   4.3   CL  97*   --   99   CO2  29   --   25   BUN  13   --   12   CREATININE  0.89  0.96  0.77   GLUCOSE  135*   --   106*     S. Calcium:  Recent Labs      10/24/18   0416   CALCIUM  8.8     S. Ionized Calcium:No results for input(s): IONCA in the last 72 hours. S. Magnesium:  Recent Labs      10/23/18   1340   MG  1.8     S. Phosphorus:No results for input(s): PHOS in the last 72 hours. S. Glucose:  Recent Labs      10/23/18   1350   POCGLU  137*     Glycosylated hemoglobin A1C: No results for input(s): LABA1C in the last 72 hours. INR:   Recent Labs      10/23/18   1340   INR  0.9     Hepatic functions:   Recent Labs      10/23/18   1340   ALKPHOS  97   ALT  26   AST  61*   PROT  7.2   BILITOT  0.58   LABALBU  3.4*     Pancreatic functions:No results for input(s): LACTA, AMYLASE in the last 72 hours. S. Lactic Acid: No results for input(s): LACTA in the last 72 hours. Cardiac enzymes:  Recent Labs      10/23/18   1336  10/23/18   1551   TROPONINI  0.00  0.00     BNP:No results for input(s): BNP in the last 72 hours.   Lipid profile: No results encephalopathy  Resolved Problems:    * No resolved hospital problems. *      PLAN     Decompensated COPD (chronic obstructive pulmonary disease)  Begin with pulse oximetry, oxygen therapy to target PaO2 < 55 or SaO2 <88%  Sputum gram stain   Decrease Solu-Medrol 40 mg IV q24 for agitation of undetermined origin   Albuterol/Duoneb 4 times daily. Levaquin 500 mg daily. Rule out influenza with rapid test  Get Pneumococcal and influenza vaccine     Chronic Heart failure, diastolic-compnesated  Daily weight/Strict Is and Low sodium diet (<2g daily)  Fluid restriction to 2L/day  Furosemide 40 mg po IV today will switch to PO tomorrow  Switch Toporol to Coreg for better rate control         Acute Encephalopathy. Cause not apperent. May be steroid induced. Haldol 2mg PRN, psych consult. Get EKG to follow up QTC, notify physician if QTC>475  Up with assist PT/OT. Bedside sitter    Lovenox 40 mg SC daily for DVT prophylaxis    Discharge: 1-2 days Pending resolution of dyspnea and improved exercise tolerance and psych evaluation for  Behavioral changes and aggitation. Switch to po tapering prednisone before discharge. Kofi Garber MD   PGY 3 Internal Medicine Resident   [facility] Attending    Pt seen and examined   I have discussed the care of pt , including pertinent history and exam findings,  with the resident. I have reviewed the key elements of all parts of the encounter with the resident. I agree with the assessment, plan and orders as documented by the resident.     Electronically signed by Srini Regalado MD

## 2018-10-24 NOTE — PROGRESS NOTES
Smoking Cessation - topics covered   []  Health Risks  []  Benefits of Quitting   []  Smoking Cessation  []  Patient has no history of tobacco use  [x]  Patient is former smoker. [x]  No need for tobacco cessation education. []  Booklet given  []  Patient verbalizes understanding. []  Patient denies need for tobacco cessation education.   Jermaine Cellar  10:07 AM

## 2018-10-24 NOTE — PROGRESS NOTES
Patient very argumentative yelling at [address] and family . Family arguing with patient because she is being argumentative and refusing care. Refusing oxygen, telemetry ,flu swab. Arguing with family  Escalating, security called and visited. Dr. Bulmaro burrows. Patient also wanting to leave AMA. Some confusion and forgetfullness continued. Patient desats 87 when off oxygen

## 2018-10-25 VITALS
BODY MASS INDEX: 28.4 KG/M2 | HEIGHT: 59 IN | DIASTOLIC BLOOD PRESSURE: 53 MMHG | SYSTOLIC BLOOD PRESSURE: 95 MMHG | TEMPERATURE: 98.2 F | RESPIRATION RATE: 20 BRPM | WEIGHT: 140.9 LBS | OXYGEN SATURATION: 96 % | HEART RATE: 103 BPM

## 2018-10-25 PROBLEM — G93.40 ACUTE ENCEPHALOPATHY: Status: RESOLVED | Noted: 2018-10-24 | Resolved: 2018-10-25

## 2018-10-25 PROBLEM — J96.01 ACUTE RESPIRATORY FAILURE WITH HYPOXIA (HCC): Status: RESOLVED | Noted: 2018-10-12 | Resolved: 2018-10-25

## 2018-10-25 LAB
-: NORMAL
ABSOLUTE EOS #: 0.13 K/UL (ref 0–0.4)
ABSOLUTE IMMATURE GRANULOCYTE: 0.13 K/UL (ref 0–0.3)
ABSOLUTE LYMPH #: 1.6 K/UL (ref 1–4.8)
ABSOLUTE MONO #: 1.2 K/UL (ref 0.1–0.8)
BASOPHILS # BLD: 0 % (ref 0–2)
BASOPHILS ABSOLUTE: 0 K/UL (ref 0–0.2)
CULTURE: NO GROWTH
DIFFERENTIAL TYPE: ABNORMAL
EKG ATRIAL RATE: 102 BPM
EKG P AXIS: 44 DEGREES
EKG P-R INTERVAL: 156 MS
EKG Q-T INTERVAL: 352 MS
EKG QRS DURATION: 70 MS
EKG QTC CALCULATION (BAZETT): 458 MS
EKG R AXIS: 58 DEGREES
EKG T AXIS: 51 DEGREES
EKG VENTRICULAR RATE: 102 BPM
EOSINOPHILS RELATIVE PERCENT: 1 % (ref 1–4)
HCT VFR BLD CALC: 40.3 % (ref 36.3–47.1)
HEMOGLOBIN: 12.6 G/DL (ref 11.9–15.1)
IMMATURE GRANULOCYTES: 1 %
LYMPHOCYTES # BLD: 12 % (ref 24–44)
Lab: NORMAL
MCH RBC QN AUTO: 33.2 PG (ref 25.2–33.5)
MCHC RBC AUTO-ENTMCNC: 31.3 G/DL (ref 28.4–34.8)
MCV RBC AUTO: 106.1 FL (ref 82.6–102.9)
MONOCYTES # BLD: 9 % (ref 1–7)
MORPHOLOGY: ABNORMAL
NRBC AUTOMATED: 0.4 PER 100 WBC
PDW BLD-RTO: 14.7 % (ref 11.8–14.4)
PLATELET # BLD: ABNORMAL K/UL (ref 138–453)
PLATELET ESTIMATE: ABNORMAL
PLATELET, FLUORESCENCE: NORMAL K/UL (ref 138–453)
PLATELET, IMMATURE FRACTION: NORMAL % (ref 1.1–10.3)
PMV BLD AUTO: ABNORMAL FL (ref 8.1–13.5)
RBC # BLD: 3.8 M/UL (ref 3.95–5.11)
RBC # BLD: ABNORMAL 10*6/UL
REASON FOR REJECTION: NORMAL
SEG NEUTROPHILS: 77 % (ref 36–66)
SEGMENTED NEUTROPHILS ABSOLUTE COUNT: 10.24 K/UL (ref 1.8–7.7)
SPECIMEN DESCRIPTION: NORMAL
STATUS: NORMAL
WBC # BLD: 13.3 K/UL (ref 3.5–11.3)
WBC # BLD: ABNORMAL 10*3/UL
ZZ NTE CLEAN UP: ORDERED TEST: NORMAL
ZZ NTE WITH NAME CLEAN UP: SPECIMEN SOURCE: NORMAL

## 2018-10-25 PROCEDURE — 36415 COLL VENOUS BLD VENIPUNCTURE: CPT

## 2018-10-25 PROCEDURE — 6370000000 HC RX 637 (ALT 250 FOR IP): Performed by: STUDENT IN AN ORGANIZED HEALTH CARE EDUCATION/TRAINING PROGRAM

## 2018-10-25 PROCEDURE — 94640 AIRWAY INHALATION TREATMENT: CPT

## 2018-10-25 PROCEDURE — 6370000000 HC RX 637 (ALT 250 FOR IP): Performed by: INTERNAL MEDICINE

## 2018-10-25 PROCEDURE — 2580000003 HC RX 258: Performed by: STUDENT IN AN ORGANIZED HEALTH CARE EDUCATION/TRAINING PROGRAM

## 2018-10-25 PROCEDURE — 6360000002 HC RX W HCPCS: Performed by: STUDENT IN AN ORGANIZED HEALTH CARE EDUCATION/TRAINING PROGRAM

## 2018-10-25 PROCEDURE — 85025 COMPLETE CBC W/AUTO DIFF WBC: CPT

## 2018-10-25 PROCEDURE — 93005 ELECTROCARDIOGRAM TRACING: CPT

## 2018-10-25 PROCEDURE — 85055 RETICULATED PLATELET ASSAY: CPT

## 2018-10-25 PROCEDURE — 99239 HOSP IP/OBS DSCHRG MGMT >30: CPT | Performed by: INTERNAL MEDICINE

## 2018-10-25 RX ORDER — CARVEDILOL 12.5 MG/1
12.5 TABLET ORAL 2 TIMES DAILY WITH MEALS
Qty: 60 TABLET | Refills: 3 | Status: ON HOLD | OUTPATIENT
Start: 2018-10-25 | End: 2018-11-13 | Stop reason: HOSPADM

## 2018-10-25 RX ORDER — PREDNISONE 10 MG/1
20 TABLET ORAL DAILY
Qty: 6 TABLET | Refills: 0 | Status: ON HOLD | OUTPATIENT
Start: 2018-11-01 | End: 2018-11-13 | Stop reason: HOSPADM

## 2018-10-25 RX ORDER — IPRATROPIUM BROMIDE AND ALBUTEROL SULFATE 2.5; .5 MG/3ML; MG/3ML
3 SOLUTION RESPIRATORY (INHALATION) EVERY 4 HOURS
Qty: 1 VIAL | Refills: 2 | Status: SHIPPED | OUTPATIENT
Start: 2018-10-25 | End: 2018-12-05 | Stop reason: SDUPTHER

## 2018-10-25 RX ORDER — LEVOFLOXACIN 500 MG/1
500 TABLET, FILM COATED ORAL DAILY
Qty: 3 TABLET | Refills: 0 | Status: SHIPPED | OUTPATIENT
Start: 2018-10-26 | End: 2018-10-29

## 2018-10-25 RX ORDER — PREDNISONE 10 MG/1
10 TABLET ORAL DAILY
Qty: 3 TABLET | Refills: 0 | Status: ON HOLD | OUTPATIENT
Start: 2018-11-04 | End: 2018-11-13 | Stop reason: HOSPADM

## 2018-10-25 RX ORDER — CARVEDILOL 12.5 MG/1
12.5 TABLET ORAL 2 TIMES DAILY WITH MEALS
Qty: 60 TABLET | Refills: 0 | Status: SHIPPED | OUTPATIENT
Start: 2018-10-25 | End: 2018-10-25

## 2018-10-25 RX ORDER — LEVOFLOXACIN 500 MG/1
500 TABLET, FILM COATED ORAL DAILY
Qty: 5 TABLET | Refills: 0 | Status: CANCELLED | OUTPATIENT
Start: 2018-10-25 | End: 2018-10-30

## 2018-10-25 RX ORDER — PREDNISONE 10 MG/1
TABLET ORAL
Qty: 26 TABLET | Refills: 0 | Status: CANCELLED | OUTPATIENT
Start: 2018-10-25

## 2018-10-25 RX ORDER — LEVOFLOXACIN 500 MG/1
500 TABLET, FILM COATED ORAL DAILY
Status: DISCONTINUED | OUTPATIENT
Start: 2018-10-26 | End: 2018-10-25 | Stop reason: HOSPADM

## 2018-10-25 RX ORDER — PREDNISONE 20 MG/1
40 TABLET ORAL DAILY
Status: DISCONTINUED | OUTPATIENT
Start: 2018-10-25 | End: 2018-10-25 | Stop reason: HOSPADM

## 2018-10-25 RX ORDER — PREDNISONE 10 MG/1
40 TABLET ORAL DAILY
Qty: 12 TABLET | Refills: 0 | Status: SHIPPED | OUTPATIENT
Start: 2018-10-26 | End: 2018-10-29

## 2018-10-25 RX ORDER — PREDNISONE 10 MG/1
30 TABLET ORAL DAILY
Qty: 9 TABLET | Refills: 0 | Status: SHIPPED | OUTPATIENT
Start: 2018-10-29 | End: 2018-11-01

## 2018-10-25 RX ADMIN — GABAPENTIN 300 MG: 300 CAPSULE ORAL at 14:54

## 2018-10-25 RX ADMIN — PANTOPRAZOLE SODIUM 40 MG: 40 TABLET, DELAYED RELEASE ORAL at 08:53

## 2018-10-25 RX ADMIN — FOLIC ACID 1 MG: 1 TABLET ORAL at 08:53

## 2018-10-25 RX ADMIN — HYDROCHLOROTHIAZIDE 12.5 MG: 12.5 CAPSULE ORAL at 08:53

## 2018-10-25 RX ADMIN — BUPRENORPHINE AND NALOXONE 1.5 TABLET: 8; 2 TABLET SUBLINGUAL at 08:51

## 2018-10-25 RX ADMIN — GUAIFENESIN 600 MG: 600 TABLET, EXTENDED RELEASE ORAL at 08:52

## 2018-10-25 RX ADMIN — MULTIPLE VITAMINS W/ MINERALS TAB 1 TABLET: TAB at 08:53

## 2018-10-25 RX ADMIN — GABAPENTIN 300 MG: 300 CAPSULE ORAL at 08:53

## 2018-10-25 RX ADMIN — PREDNISONE 40 MG: 20 TABLET ORAL at 11:20

## 2018-10-25 RX ADMIN — LEVOFLOXACIN 500 MG: 5 INJECTION, SOLUTION INTRAVENOUS at 02:39

## 2018-10-25 RX ADMIN — Medication 100 MG: at 08:53

## 2018-10-25 RX ADMIN — CARVEDILOL 12.5 MG: 12.5 TABLET, FILM COATED ORAL at 08:52

## 2018-10-25 RX ADMIN — Medication 10 ML: at 08:54

## 2018-10-25 RX ADMIN — LISINOPRIL 10 MG: 10 TABLET ORAL at 08:53

## 2018-10-25 RX ADMIN — IPRATROPIUM BROMIDE AND ALBUTEROL SULFATE 1 AMPULE: .5; 3 SOLUTION RESPIRATORY (INHALATION) at 09:36

## 2018-10-25 RX ADMIN — Medication 250 MCG: at 08:53

## 2018-10-25 RX ADMIN — FUROSEMIDE 40 MG: 10 INJECTION, SOLUTION INTRAMUSCULAR; INTRAVENOUS at 08:56

## 2018-10-25 RX ADMIN — MOMETASONE FUROATE AND FORMOTEROL FUMARATE DIHYDRATE 2 PUFF: 100; 5 AEROSOL RESPIRATORY (INHALATION) at 09:36

## 2018-10-25 ASSESSMENT — PAIN SCALES - GENERAL: PAINLEVEL_OUTOF10: 7

## 2018-10-25 NOTE — PROGRESS NOTES
[address] [facility].    PROGRESS NOTE             Date:   10/25/2018  Patient name:  Omega Ceja  Date of admission:  10/23/2018  1:01 PM  MRN:   [id number]  YOB: 1955    CHIEF COMPLAINT     Chief Complaint   Patient presents with    Respiratory Distress       HISTORY OF PRESENT ILLNESS       69-year-old lady with PMH: Diastolic CHF, Moderate COPD and HTN     Presented with acute chronic dry cough, occasional wheezing, and shortness of breath associated with frequent stops to catch his breath when walking one to two blocks on level ground.      She underwent inpatient treatment 2 weeks ago for decompensated COPD. sHe is a former smoker with a 40-pack-year smoking history.     On physical examination, she is tachycardiac. Examination of the lungs shows mildly decreased breath sounds throughout both lung fields and occasional scattered expiratory wheezes. The remainder of the physical examination is normal.      CXR showed chronic congestion, no evidence of acute process     Patient is being admitted for management of  decompensated COPD    Current evaluation 10/25/2018  Patient seen and examined. No acute events overnight. Maintaining saturations on room air this morning. Claims SOB and cough much improved. Denies chest pain, fever or chills. PAST MEDICAL HISTORY       has a past medical history of Appendicitis; CHF (congestive heart failure) ([address] Chronic rhinitis; Cigarette smoker; COPD (chronic obstructive pulmonary disease) ([address] Depression; Dysphagia; Essential hypertension; GERD (gastroesophageal reflux disease); Heart failure, diastolic, with acute decompensation ([address] Hepatitis C, chronic ([address] Hyperlipidemia; Hypertension; Migraine; Moderate COPD (chronic obstructive pulmonary disease) ([address] Multiple transfusions; Osteoarthritis; Pneumonia; Scoliosis; and Substance abuse ([address]     PAST SURGICAL HISTORY has a past surgical history that includes  section; LEEP (); Upper gastrointestinal endoscopy (); Gastric bypass surgery; Appendectomy (2017); and laparoscopic appendectomy (N/A, 3/27/2017). HOME MEDICATIONS        Prior to Admission medications    Medication Sig Start Date End Date Taking? Authorizing Provider   folic acid (FOLVITE) 1 MG tablet Take 1 tablet by mouth daily 10/13/18  Yes Oni Tejada MD   furosemide (LASIX) 40 MG tablet Take 1 tablet by mouth daily 10/12/18  Yes Oni Tejada MD   vitamin B-1 100 MG tablet Take 1 tablet by mouth daily 10/13/18  Yes Oni Tejada MD   vitamin B-12 250 MCG tablet Take 1 tablet by mouth daily 10/13/18  Yes Oni Tejada MD   gabapentin (NEURONTIN) 300 MG capsule Take 300 mg by mouth 3 times daily. .   Yes Historical Provider, MD   buprenorphine-naloxone (SUBOXONE) 12-3 MG sublingual film Place 1 Film under the tongue daily. .   Yes Historical Provider, MD   guaiFENesin (MUCINEX) 600 MG extended release tablet Take 1 tablet by mouth 2 times daily 18  Yes Marleni Edmondson MD   metoprolol succinate (TOPROL XL) 50 MG extended release tablet Take 1 tablet by mouth daily 18  Yes Marleni Edmondson MD   acetaminophen (APAP EXTRA STRENGTH) 500 MG tablet Take 2 tablets by mouth every 8 hours as needed for Pain 18  Yes Lavern Neal MD   lisinopril (PRINIVIL;ZESTRIL) 10 MG tablet Take 1 tablet by mouth daily 18  Yes Lavern Neal MD   docusate sodium (COLACE) 100 MG capsule Take 1 capsule by mouth 2 times daily 10/12/18   Oni Tejada MD   pantoprazole (PROTONIX) 40 MG tablet Take 1 tablet by mouth every morning (before breakfast) 18   Marleni Edmondson MD   hydrochlorothiazide (MICROZIDE) 12.5 MG capsule Take 12.5 mg by mouth every morning    Historical Provider, MD   albuterol sulfate HFA (PROVENTIL HFA) 108 (90 Base) MCG/ACT inhaler Inhale 1-2 puffs into the lungs every 4 hours as needed

## 2018-10-25 NOTE — PLAN OF CARE
Problem: Falls - Risk of:  Goal: Will remain free from falls  Will remain free from falls   Outcome: Met This Shift  Free of injury  Continue to monitor  Continue plan of care

## 2018-10-25 NOTE — DISCHARGE SUMMARY
INPATIENT DISCHARGE SUMMARY      PATIENT IDENTIFICATION:  NAME:  Sarabjit Daniels   :   1955  MRN:    [id number]     Acct:    [de-identified]   Admit Date:  10/23/2018  Discharge date:  10/25/2018  Attending Provider: Felicitas Tenorio MD                                     REASON FOR HOSPITALIZATION:   Chandrika Mccord is a 70 y.o. female who presented with dyspnea=>Bounce back from previous 12 days    DIAGNOSES:  Primary:   Acute decompensated heart failure ([address] [I50.9]  Decompensated COPD with exacerbation (chronic obstructive pulmonary disease) ([address] [J44.1]    Secondary: Active Hospital Problems    Diagnosis Date Noted    COPD exacerbation ([address] [J44.1] 2018     Priority: High    Decompensated COPD with exacerbation (chronic obstructive pulmonary disease) (HCC) [J44.1] 10/24/2018    Acute on chronic congestive heart failure (HCC) [I50.9]     Chronic heart failure with reduced ejection fraction and diastolic dysfunction ([address] [I50.42] 10/23/2018    History of substance abuse [Z87.898] 10/11/2018    Hepatitis C [B19.20] 10/11/2018    Hypertension [I10] 10/11/2018       TREATMENT:  Brief Inpatient Course:     CXR-mild vascular congestion  Sputum mixed respiratory brooks  Patient admitted for decompensated COPD.     Received IV steroid and Lasix  Has abdominal distension, abdomina ultrasound showed No sonographic evidence of ascites  Discharged on tapering steroid and Levaquin   Toprol discontinued as patient was switched to Coreg for better rate control   Declined flu/pneumonia vaccines  Continued on home oxygen upon discharge     Consults:   Psychiatry-for AMS-declined inpatient BHI as recommended     Procedures:  None     Any Hospital Acquired Infections: none    PATIENT'S DISCHARGE CONDITION:  Stable     PATIENT/FAMILY INSTRUCTIONS:      Medication List      START taking these medications    carvedilol 12.5 MG tablet  Commonly known as:  COREG  Take 1 tablet by mouth 2 times daily (with meals)     levofloxacin 500 MG tablet  Commonly known as:  LEVAQUIN  Take 1 tablet by mouth daily for 3 days  Start taking on:  10/26/2018     mometasone-formoterol 100-5 MCG/ACT inhaler  Commonly known as:  DULERA  Inhale 2 puffs into the lungs 2 times daily        CHANGE how you take these medications    ipratropium-albuterol 0.5-2.5 (3) MG/3ML Soln nebulizer solution  Commonly known as:  DUONEB  Inhale 3 mLs into the lungs every 4 hours  What changed:  · when to take this  · reasons to take this     * predniSONE 10 MG tablet  Commonly known as:  DELTASONE  Take 4 tablets by mouth daily for 3 doses  Start taking on:  10/26/2018  What changed:  · medication strength  · how much to take     * predniSONE 10 MG tablet  Commonly known as:  DELTASONE  Take 3 tablets by mouth daily for 3 doses  Start taking on:  10/29/2018  What changed: You were already taking a medication with the same name, and this prescription was added. Make sure you understand how and when to take each. * predniSONE 10 MG tablet  Commonly known as:  DELTASONE  Take 2 tablets by mouth daily for 3 days  Start taking on:  11/1/2018  What changed: You were already taking a medication with the same name, and this prescription was added. Make sure you understand how and when to take each. * predniSONE 10 MG tablet  Commonly known as:  DELTASONE  Take 1 tablet by mouth daily for 3 doses  Start taking on:  11/4/2018  What changed: You were already taking a medication with the same name, and this prescription was added. Make sure you understand how and when to take each. * This list has 4 medication(s) that are the same as other medications prescribed for you. Read the directions carefully, and ask your doctor or other care provider to review them with you.             CONTINUE taking these medications    acetaminophen 500 MG tablet  Commonly known as:  APAP EXTRA STRENGTH  Take 2 tablets by mouth every 8 hours as needed for Pain albuterol sulfate  (90 Base) MCG/ACT inhaler  Commonly known as:  PROVENTIL HFA  Inhale 1-2 puffs into the lungs every 4 hours as needed for Wheezing or Shortness of Breath (Space out to every 6 hours as symptoms improve)     cyanocobalamin 250 MCG tablet  Take 1 tablet by mouth daily     docusate sodium 100 MG capsule  Commonly known as:  COLACE  Take 1 capsule by mouth 2 times daily     folic acid 1 MG tablet  Commonly known as:  FOLVITE  Take 1 tablet by mouth daily     furosemide 40 MG tablet  Commonly known as:  LASIX  Take 1 tablet by mouth daily     gabapentin 300 MG capsule  Commonly known as:  NEURONTIN     guaiFENesin 600 MG extended release tablet  Commonly known as:  MUCINEX  Take 1 tablet by mouth 2 times daily     hydrochlorothiazide 12.5 MG capsule  Commonly known as:  MICROZIDE     lisinopril 10 MG tablet  Commonly known as:  PRINIVIL;ZESTRIL  Take 1 tablet by mouth daily     pantoprazole 40 MG tablet  Commonly known as:  PROTONIX  Take 1 tablet by mouth every morning (before breakfast)     SUBOXONE 12-3 MG sublingual film  Generic drug:  buprenorphine-naloxone     therapeutic multivitamin-minerals tablet  Take 1 tablet by mouth daily     thiamine 100 MG tablet  Take 1 tablet by mouth daily        STOP taking these medications    metoprolol succinate 50 MG extended release tablet  Commonly known as:  TOPROL XL  Discontinued as patient was switched to Coreg for better rate control          Where to Get Your Medications      These medications were sent to [address] [place] [address], [address] [facility]    Phone:  [phone]   · carvedilol 12.5 MG tablet  · ipratropium-albuterol 0.5-2.5 (3) MG/3ML Soln nebulizer solution  · levofloxacin 500 MG tablet  · mometasone-formoterol 100-5 MCG/ACT inhaler  · predniSONE 10 MG tablet  · predniSONE 10 MG tablet  · predniSONE 10 MG tablet  · predniSONE 10 MG tablet         Activity:

## 2018-10-25 NOTE — PROGRESS NOTES
Instructions given to patient verbalized understanding but stated im going to check with my own doctor before I do all of this.

## 2018-10-27 LAB
CULTURE: ABNORMAL
CULTURE: ABNORMAL
DIRECT EXAM: ABNORMAL
Lab: ABNORMAL
SPECIMEN DESCRIPTION: ABNORMAL
STATUS: ABNORMAL

## 2018-11-04 ENCOUNTER — APPOINTMENT (OUTPATIENT)
Dept: GENERAL RADIOLOGY | Age: 63
DRG: 241 | End: 2018-11-04
Payer: MEDICARE

## 2018-11-04 ENCOUNTER — HOSPITAL ENCOUNTER (INPATIENT)
Age: 63
LOS: 9 days | Discharge: HOME HEALTH | DRG: 241 | End: 2018-11-13
Attending: EMERGENCY MEDICINE | Admitting: INTERNAL MEDICINE
Payer: MEDICARE

## 2018-11-04 ENCOUNTER — APPOINTMENT (OUTPATIENT)
Dept: CT IMAGING | Age: 63
DRG: 241 | End: 2018-11-04
Payer: MEDICARE

## 2018-11-04 DIAGNOSIS — K92.2 UPPER GI BLEED: Primary | ICD-10-CM

## 2018-11-04 PROBLEM — J44.9 COPD (CHRONIC OBSTRUCTIVE PULMONARY DISEASE) (HCC): Status: ACTIVE | Noted: 2018-11-04

## 2018-11-04 LAB
-: ABNORMAL
ABSOLUTE EOS #: 0.31 K/UL (ref 0–0.4)
ABSOLUTE IMMATURE GRANULOCYTE: 0.62 K/UL (ref 0–0.3)
ABSOLUTE LYMPH #: 5.15 K/UL (ref 1–4.8)
ABSOLUTE MONO #: 0.78 K/UL (ref 0.1–0.8)
ALBUMIN SERPL-MCNC: 3.5 G/DL (ref 3.5–5.2)
ALBUMIN/GLOBULIN RATIO: 1.3 (ref 1–2.5)
ALP BLD-CCNC: 57 U/L (ref 35–104)
ALT SERPL-CCNC: 15 U/L (ref 5–33)
AMORPHOUS: ABNORMAL
ANION GAP SERPL CALCULATED.3IONS-SCNC: 12 MMOL/L (ref 9–17)
AST SERPL-CCNC: 30 U/L
BACTERIA: ABNORMAL
BASOPHILS # BLD: 0 % (ref 0–2)
BASOPHILS ABSOLUTE: 0 K/UL (ref 0–0.2)
BILIRUB SERPL-MCNC: 0.49 MG/DL (ref 0.3–1.2)
BILIRUBIN URINE: NEGATIVE
BNP INTERPRETATION: NORMAL
BUN BLDV-MCNC: 50 MG/DL (ref 8–23)
BUN/CREAT BLD: ABNORMAL (ref 9–20)
CALCIUM SERPL-MCNC: 8.5 MG/DL (ref 8.6–10.4)
CASTS UA: ABNORMAL /LPF (ref 0–2)
CHLORIDE BLD-SCNC: 96 MMOL/L (ref 98–107)
CO2: 25 MMOL/L (ref 20–31)
COLOR: YELLOW
CREAT SERPL-MCNC: 1.2 MG/DL (ref 0.5–0.9)
CRYSTALS, UA: ABNORMAL /HPF
DIFFERENTIAL TYPE: ABNORMAL
EOSINOPHILS RELATIVE PERCENT: 2 % (ref 1–4)
EPITHELIAL CELLS UA: ABNORMAL /HPF (ref 0–5)
GFR AFRICAN AMERICAN: 55 ML/MIN
GFR NON-AFRICAN AMERICAN: 45 ML/MIN
GFR SERPL CREATININE-BSD FRML MDRD: ABNORMAL ML/MIN/{1.73_M2}
GFR SERPL CREATININE-BSD FRML MDRD: ABNORMAL ML/MIN/{1.73_M2}
GLUCOSE BLD-MCNC: 158 MG/DL (ref 70–99)
GLUCOSE URINE: NEGATIVE
HCT VFR BLD CALC: 25.7 % (ref 36.3–47.1)
HCT VFR BLD CALC: 26.6 % (ref 36.3–47.1)
HCT VFR BLD CALC: 34.3 % (ref 36.3–47.1)
HEMOGLOBIN: 10.3 G/DL (ref 11.9–15.1)
HEMOGLOBIN: 7.8 G/DL (ref 11.9–15.1)
HEMOGLOBIN: 8.6 G/DL (ref 11.9–15.1)
IMMATURE GRANULOCYTES: 4 %
INR BLD: 1
KETONES, URINE: NEGATIVE
LACTIC ACID, WHOLE BLOOD: 2.7 MMOL/L (ref 0.7–2.1)
LEUKOCYTE ESTERASE, URINE: NEGATIVE
LIPASE: 79 U/L (ref 13–60)
LYMPHOCYTES # BLD: 33 % (ref 24–44)
MCH RBC QN AUTO: 32.1 PG (ref 25.2–33.5)
MCHC RBC AUTO-ENTMCNC: 30 G/DL (ref 28.4–34.8)
MCV RBC AUTO: 106.9 FL (ref 82.6–102.9)
MONOCYTES # BLD: 5 % (ref 1–7)
MORPHOLOGY: ABNORMAL
MUCUS: ABNORMAL
NITRITE, URINE: NEGATIVE
NRBC AUTOMATED: 0.3 PER 100 WBC
OTHER OBSERVATIONS UA: ABNORMAL
PARTIAL THROMBOPLASTIN TIME: 19.3 SEC (ref 20.5–30.5)
PDW BLD-RTO: 14.6 % (ref 11.8–14.4)
PH UA: 7 (ref 5–8)
PLATELET # BLD: 637 K/UL (ref 138–453)
PLATELET ESTIMATE: ABNORMAL
PMV BLD AUTO: 9.1 FL (ref 8.1–13.5)
POC TROPONIN I: 0 NG/ML (ref 0–0.1)
POC TROPONIN I: 0 NG/ML (ref 0–0.1)
POC TROPONIN INTERP: NORMAL
POC TROPONIN INTERP: NORMAL
POTASSIUM SERPL-SCNC: 4 MMOL/L (ref 3.7–5.3)
PRO-BNP: 83 PG/ML
PROTEIN UA: NEGATIVE
PROTHROMBIN TIME: 10.3 SEC (ref 9–12)
RBC # BLD: 3.21 M/UL (ref 3.95–5.11)
RBC # BLD: ABNORMAL 10*6/UL
RBC UA: ABNORMAL /HPF (ref 0–2)
RENAL EPITHELIAL, UA: ABNORMAL /HPF
SEG NEUTROPHILS: 56 % (ref 36–66)
SEGMENTED NEUTROPHILS ABSOLUTE COUNT: 8.74 K/UL (ref 1.8–7.7)
SODIUM BLD-SCNC: 133 MMOL/L (ref 135–144)
SPECIFIC GRAVITY UA: 1.03 (ref 1–1.03)
TOTAL PROTEIN: 6.3 G/DL (ref 6.4–8.3)
TRICHOMONAS: ABNORMAL
TURBIDITY: CLEAR
UREA NITROGEN, UR: 505 MG/DL
URINE HGB: ABNORMAL
UROBILINOGEN, URINE: NORMAL
WBC # BLD: 15.6 K/UL (ref 3.5–11.3)
WBC # BLD: ABNORMAL 10*3/UL
WBC UA: ABNORMAL /HPF (ref 0–5)
YEAST: ABNORMAL

## 2018-11-04 PROCEDURE — 99285 EMERGENCY DEPT VISIT HI MDM: CPT

## 2018-11-04 PROCEDURE — 85025 COMPLETE CBC W/AUTO DIFF WBC: CPT

## 2018-11-04 PROCEDURE — 96365 THER/PROPH/DIAG IV INF INIT: CPT

## 2018-11-04 PROCEDURE — 36460 INTRAUTERINE TRANSFUSION FTL: CPT

## 2018-11-04 PROCEDURE — 85730 THROMBOPLASTIN TIME PARTIAL: CPT

## 2018-11-04 PROCEDURE — 2580000003 HC RX 258: Performed by: STUDENT IN AN ORGANIZED HEALTH CARE EDUCATION/TRAINING PROGRAM

## 2018-11-04 PROCEDURE — 6360000002 HC RX W HCPCS: Performed by: STUDENT IN AN ORGANIZED HEALTH CARE EDUCATION/TRAINING PROGRAM

## 2018-11-04 PROCEDURE — 85610 PROTHROMBIN TIME: CPT

## 2018-11-04 PROCEDURE — 6370000000 HC RX 637 (ALT 250 FOR IP): Performed by: STUDENT IN AN ORGANIZED HEALTH CARE EDUCATION/TRAINING PROGRAM

## 2018-11-04 PROCEDURE — 86900 BLOOD TYPING SEROLOGIC ABO: CPT

## 2018-11-04 PROCEDURE — 80053 COMPREHEN METABOLIC PANEL: CPT

## 2018-11-04 PROCEDURE — 85018 HEMOGLOBIN: CPT

## 2018-11-04 PROCEDURE — 6360000004 HC RX CONTRAST MEDICATION: Performed by: STUDENT IN AN ORGANIZED HEALTH CARE EDUCATION/TRAINING PROGRAM

## 2018-11-04 PROCEDURE — 2060000000 HC ICU INTERMEDIATE R&B

## 2018-11-04 PROCEDURE — 83880 ASSAY OF NATRIURETIC PEPTIDE: CPT

## 2018-11-04 PROCEDURE — 36415 COLL VENOUS BLD VENIPUNCTURE: CPT

## 2018-11-04 PROCEDURE — 74177 CT ABD & PELVIS W/CONTRAST: CPT

## 2018-11-04 PROCEDURE — 81001 URINALYSIS AUTO W/SCOPE: CPT

## 2018-11-04 PROCEDURE — 86901 BLOOD TYPING SEROLOGIC RH(D): CPT

## 2018-11-04 PROCEDURE — 86850 RBC ANTIBODY SCREEN: CPT

## 2018-11-04 PROCEDURE — 83690 ASSAY OF LIPASE: CPT

## 2018-11-04 PROCEDURE — 85014 HEMATOCRIT: CPT

## 2018-11-04 PROCEDURE — P9016 RBC LEUKOCYTES REDUCED: HCPCS

## 2018-11-04 PROCEDURE — C9113 INJ PANTOPRAZOLE SODIUM, VIA: HCPCS | Performed by: STUDENT IN AN ORGANIZED HEALTH CARE EDUCATION/TRAINING PROGRAM

## 2018-11-04 PROCEDURE — 84484 ASSAY OF TROPONIN QUANT: CPT

## 2018-11-04 PROCEDURE — 93005 ELECTROCARDIOGRAM TRACING: CPT

## 2018-11-04 PROCEDURE — 71045 X-RAY EXAM CHEST 1 VIEW: CPT

## 2018-11-04 PROCEDURE — 87086 URINE CULTURE/COLONY COUNT: CPT

## 2018-11-04 PROCEDURE — 83605 ASSAY OF LACTIC ACID: CPT

## 2018-11-04 PROCEDURE — 94640 AIRWAY INHALATION TREATMENT: CPT

## 2018-11-04 PROCEDURE — 86920 COMPATIBILITY TEST SPIN: CPT

## 2018-11-04 PROCEDURE — 84540 ASSAY OF URINE/UREA-N: CPT

## 2018-11-04 RX ORDER — 0.9 % SODIUM CHLORIDE 0.9 %
1000 INTRAVENOUS SOLUTION INTRAVENOUS ONCE
Status: DISCONTINUED | OUTPATIENT
Start: 2018-11-04 | End: 2018-11-04

## 2018-11-04 RX ORDER — CHOLECALCIFEROL (VITAMIN D3) 125 MCG
250 CAPSULE ORAL DAILY
Status: DISCONTINUED | OUTPATIENT
Start: 2018-11-05 | End: 2018-11-07

## 2018-11-04 RX ORDER — M-VIT,TX,IRON,MINS/CALC/FOLIC 27MG-0.4MG
1 TABLET ORAL DAILY
Status: DISCONTINUED | OUTPATIENT
Start: 2018-11-05 | End: 2018-11-07

## 2018-11-04 RX ORDER — ACETAMINOPHEN 325 MG/1
650 TABLET ORAL EVERY 4 HOURS PRN
Status: CANCELLED | OUTPATIENT
Start: 2018-11-04

## 2018-11-04 RX ORDER — POTASSIUM CHLORIDE 20 MEQ/1
40 TABLET, EXTENDED RELEASE ORAL PRN
Status: DISCONTINUED | OUTPATIENT
Start: 2018-11-04 | End: 2018-11-13 | Stop reason: HOSPADM

## 2018-11-04 RX ORDER — ALBUTEROL SULFATE 2.5 MG/3ML
2.5 SOLUTION RESPIRATORY (INHALATION)
Status: DISCONTINUED | OUTPATIENT
Start: 2018-11-04 | End: 2018-11-04

## 2018-11-04 RX ORDER — 0.9 % SODIUM CHLORIDE 0.9 %
1000 INTRAVENOUS SOLUTION INTRAVENOUS ONCE
Status: COMPLETED | OUTPATIENT
Start: 2018-11-04 | End: 2018-11-04

## 2018-11-04 RX ORDER — ONDANSETRON 2 MG/ML
4 INJECTION INTRAMUSCULAR; INTRAVENOUS EVERY 6 HOURS PRN
Status: DISCONTINUED | OUTPATIENT
Start: 2018-11-04 | End: 2018-11-13 | Stop reason: HOSPADM

## 2018-11-04 RX ORDER — ALBUTEROL SULFATE 2.5 MG/3ML
2.5 SOLUTION RESPIRATORY (INHALATION) EVERY 6 HOURS PRN
Status: DISCONTINUED | OUTPATIENT
Start: 2018-11-04 | End: 2018-11-13 | Stop reason: HOSPADM

## 2018-11-04 RX ORDER — SODIUM CHLORIDE 0.9 % (FLUSH) 0.9 %
10 SYRINGE (ML) INJECTION EVERY 12 HOURS SCHEDULED
Status: DISCONTINUED | OUTPATIENT
Start: 2018-11-04 | End: 2018-11-13 | Stop reason: HOSPADM

## 2018-11-04 RX ORDER — SODIUM CHLORIDE 9 MG/ML
INJECTION, SOLUTION INTRAVENOUS CONTINUOUS
Status: DISCONTINUED | OUTPATIENT
Start: 2018-11-04 | End: 2018-11-06

## 2018-11-04 RX ORDER — SODIUM CHLORIDE 0.9 % (FLUSH) 0.9 %
10 SYRINGE (ML) INJECTION PRN
Status: DISCONTINUED | OUTPATIENT
Start: 2018-11-04 | End: 2018-11-06

## 2018-11-04 RX ORDER — IPRATROPIUM BROMIDE AND ALBUTEROL SULFATE 2.5; .5 MG/3ML; MG/3ML
3 SOLUTION RESPIRATORY (INHALATION) EVERY 4 HOURS
Status: DISCONTINUED | OUTPATIENT
Start: 2018-11-04 | End: 2018-11-04

## 2018-11-04 RX ORDER — SODIUM CHLORIDE 0.9 % (FLUSH) 0.9 %
10 SYRINGE (ML) INJECTION EVERY 12 HOURS SCHEDULED
Status: CANCELLED | OUTPATIENT
Start: 2018-11-04

## 2018-11-04 RX ORDER — NICOTINE 21 MG/24HR
1 PATCH, TRANSDERMAL 24 HOURS TRANSDERMAL PRN
Status: DISCONTINUED | OUTPATIENT
Start: 2018-11-04 | End: 2018-11-13 | Stop reason: HOSPADM

## 2018-11-04 RX ORDER — IPRATROPIUM BROMIDE AND ALBUTEROL SULFATE 2.5; .5 MG/3ML; MG/3ML
3 SOLUTION RESPIRATORY (INHALATION) 4 TIMES DAILY
Status: DISCONTINUED | OUTPATIENT
Start: 2018-11-05 | End: 2018-11-10

## 2018-11-04 RX ORDER — POTASSIUM CHLORIDE 20MEQ/15ML
40 LIQUID (ML) ORAL PRN
Status: DISCONTINUED | OUTPATIENT
Start: 2018-11-04 | End: 2018-11-13 | Stop reason: HOSPADM

## 2018-11-04 RX ORDER — GUAIFENESIN 600 MG/1
600 TABLET, EXTENDED RELEASE ORAL 2 TIMES DAILY
Status: DISCONTINUED | OUTPATIENT
Start: 2018-11-04 | End: 2018-11-07

## 2018-11-04 RX ORDER — GABAPENTIN 300 MG/1
300 CAPSULE ORAL 3 TIMES DAILY
Status: DISCONTINUED | OUTPATIENT
Start: 2018-11-04 | End: 2018-11-07

## 2018-11-04 RX ORDER — THIAMINE MONONITRATE (VIT B1) 100 MG
100 TABLET ORAL DAILY
Status: DISCONTINUED | OUTPATIENT
Start: 2018-11-05 | End: 2018-11-05

## 2018-11-04 RX ORDER — SODIUM CHLORIDE 0.9 % (FLUSH) 0.9 %
10 SYRINGE (ML) INJECTION PRN
Status: CANCELLED | OUTPATIENT
Start: 2018-11-04

## 2018-11-04 RX ORDER — ALBUTEROL SULFATE 90 UG/1
2 AEROSOL, METERED RESPIRATORY (INHALATION) EVERY 6 HOURS PRN
Status: DISCONTINUED | OUTPATIENT
Start: 2018-11-04 | End: 2018-11-04

## 2018-11-04 RX ORDER — POTASSIUM CHLORIDE 7.45 MG/ML
10 INJECTION INTRAVENOUS PRN
Status: DISCONTINUED | OUTPATIENT
Start: 2018-11-04 | End: 2018-11-13 | Stop reason: HOSPADM

## 2018-11-04 RX ORDER — 0.9 % SODIUM CHLORIDE 0.9 %
250 INTRAVENOUS SOLUTION INTRAVENOUS ONCE
Status: COMPLETED | OUTPATIENT
Start: 2018-11-04 | End: 2018-11-04

## 2018-11-04 RX ORDER — ACETAMINOPHEN 500 MG
1000 TABLET ORAL ONCE
Status: COMPLETED | OUTPATIENT
Start: 2018-11-04 | End: 2018-11-04

## 2018-11-04 RX ADMIN — MOMETASONE FUROATE AND FORMOTEROL FUMARATE DIHYDRATE 2 PUFF: 100; 5 AEROSOL RESPIRATORY (INHALATION) at 21:44

## 2018-11-04 RX ADMIN — IOPAMIDOL 75 ML: 755 INJECTION, SOLUTION INTRAVENOUS at 15:57

## 2018-11-04 RX ADMIN — IPRATROPIUM BROMIDE AND ALBUTEROL SULFATE 3 ML: .5; 3 SOLUTION RESPIRATORY (INHALATION) at 21:44

## 2018-11-04 RX ADMIN — SODIUM CHLORIDE 1000 ML: 9 INJECTION, SOLUTION INTRAVENOUS at 22:49

## 2018-11-04 RX ADMIN — SODIUM CHLORIDE 8 MG/HR: 9 INJECTION, SOLUTION INTRAVENOUS at 15:19

## 2018-11-04 RX ADMIN — SODIUM CHLORIDE 250 ML: 0.9 INJECTION, SOLUTION INTRAVENOUS at 18:30

## 2018-11-04 RX ADMIN — HYDROCORTISONE SODIUM SUCCINATE 100 MG: 100 INJECTION, POWDER, FOR SOLUTION INTRAMUSCULAR; INTRAVENOUS at 23:52

## 2018-11-04 RX ADMIN — SODIUM CHLORIDE: 9 INJECTION, SOLUTION INTRAVENOUS at 22:00

## 2018-11-04 RX ADMIN — GUAIFENESIN 600 MG: 600 TABLET, EXTENDED RELEASE ORAL at 23:52

## 2018-11-04 RX ADMIN — ACETAMINOPHEN 1000 MG: 500 TABLET ORAL at 14:42

## 2018-11-04 RX ADMIN — SODIUM CHLORIDE 80 MG: 9 INJECTION, SOLUTION INTRAVENOUS at 14:48

## 2018-11-04 RX ADMIN — GABAPENTIN 300 MG: 300 CAPSULE ORAL at 23:52

## 2018-11-04 RX ADMIN — SODIUM CHLORIDE 1000 ML: 9 INJECTION, SOLUTION INTRAVENOUS at 14:26

## 2018-11-04 ASSESSMENT — ENCOUNTER SYMPTOMS
ABDOMINAL PAIN: 1
SHORTNESS OF BREATH: 1
EYE PAIN: 0
RHINORRHEA: 0
APNEA: 0
EYE REDNESS: 0
SINUS PAIN: 0
CONSTIPATION: 0
ABDOMINAL DISTENTION: 1
BLOOD IN STOOL: 1
WHEEZING: 0
COUGH: 0
VOMITING: 0
NAUSEA: 1
DIARRHEA: 1

## 2018-11-04 ASSESSMENT — PAIN DESCRIPTION - PAIN TYPE: TYPE: ACUTE PAIN

## 2018-11-04 ASSESSMENT — PAIN DESCRIPTION - DESCRIPTORS: DESCRIPTORS: DISCOMFORT

## 2018-11-04 ASSESSMENT — PAIN SCALES - GENERAL
PAINLEVEL_OUTOF10: 8
PAINLEVEL_OUTOF10: 8

## 2018-11-04 ASSESSMENT — PAIN DESCRIPTION - ORIENTATION: ORIENTATION: MID

## 2018-11-04 ASSESSMENT — PAIN DESCRIPTION - LOCATION: LOCATION: ABDOMEN

## 2018-11-04 NOTE — ED PROVIDER NOTES
distension and no mass. There is tenderness (Mild generalized). Genitourinary:   Genitourinary Comments: Guaiac-positive stool covering her entire perineum, vagina, groin, and buttocks   Musculoskeletal: Normal range of motion. Neurological: She is alert and oriented to person, place, and time. She exhibits normal muscle tone. Coordination normal.   Skin: Skin is warm and dry. No rash noted. She is not diaphoretic. DIFFERENTIAL  DIAGNOSIS     PLAN (LABS / IMAGING / EKG):  Orders Placed This Encounter   Procedures    CT ABDOMEN PELVIS W IV CONTRAST    CBC Auto Differential    COMPREHENSIVE METABOLIC PANEL    LIPASE    APTT    Protime-INR    Brain Natriuretic Peptide    Urinalysis with Microscopic    POCT troponin    EKG 12 Lead    TYPE AND SCREEN       MEDICATIONS ORDERED:  ED Medication Orders     Start Ordered     Status Ordering Provider    11/04/18 1430 11/04/18 1415  pantoprazole (PROTONIX) 80 mg in sodium chloride 0.9 % 100 mL infusion  CONTINUOUS      Ordered SELINA WAY    11/04/18 1430 11/04/18 1417  0.9 % sodium chloride bolus  ONCE      Ordered SELINA WAY    11/04/18 1415 11/04/18 1415  pantoprazole (PROTONIX) 80 mg in sodium chloride 0.9 % 50 mL bolus  ONCE      Ordered SELINA WAY          DDX: Upper gi bleed, gastritis, duodenal ulcer, variceal bleed    DIAGNOSTIC RESULTS / EMERGENCY DEPARTMENT COURSE / MDM     IMPRESSION:  64y/o female with hx of CHF, COPD, GERD, dysphagia, HepC, HTN, who presents to ED with dark loose stools and abdominal pain. Patient had soiled herself at home and has very melanotic stool with consistent odor. Guaiac positive. Hypotensive on arrival. IV access established and fluids started. Cardiac RRR, no murmurs, rubs, gallops, Lungs are CTA-B, no wheezes, rales, rhonchi, Abd soft, diffusely tender, nondistended. We will check basic labs, start protonix drip, fluids, type and screen. Will speak with GI and admit to medicine.        LABS:  Results for orders placed or performed during the hospital encounter of 11/04/18   CBC Auto Differential   Result Value Ref Range    WBC 15.6 (H) 3.5 - 11.3 k/uL    RBC 3.21 (L) 3.95 - 5.11 m/uL    Hemoglobin 10.3 (L) 11.9 - 15.1 g/dL    Hematocrit 34.3 (L) 36.3 - 47.1 %    .9 (H) 82.6 - 102.9 fL    MCH 32.1 25.2 - 33.5 pg    MCHC 30.0 28.4 - 34.8 g/dL    RDW 14.6 (H) 11.8 - 14.4 %    Platelets 934 (H) 442 - 453 k/uL    MPV 9.1 8.1 - 13.5 fL    NRBC Automated 0.3 (H) 0.0 per 100 WBC    Differential Type NOT REPORTED     WBC Morphology NOT REPORTED     RBC Morphology NOT REPORTED     Platelet Estimate NOT REPORTED     Immature Granulocytes 4 (H) 0 %    Seg Neutrophils 56 36 - 66 %    Lymphocytes 33 24 - 44 %    Monocytes 5 1 - 7 %    Eosinophils % 2 1 - 4 %    Basophils 0 0 - 2 %    Absolute Immature Granulocyte 0.62 (H) 0.00 - 0.30 k/uL    Segs Absolute 8.74 (H) 1.8 - 7.7 k/uL    Absolute Lymph # 5.15 (H) 1.0 - 4.8 k/uL    Absolute Mono # 0.78 0.1 - 0.8 k/uL    Absolute Eos # 0.31 0.0 - 0.4 k/uL    Basophils # 0.00 0.0 - 0.2 k/uL    Morphology ANISOCYTOSIS PRESENT    COMPREHENSIVE METABOLIC PANEL   Result Value Ref Range    Glucose 158 (H) 70 - 99 mg/dL    BUN 50 (H) 8 - 23 mg/dL    CREATININE 1.20 (H) 0.50 - 0.90 mg/dL    Bun/Cre Ratio NOT REPORTED 9 - 20    Calcium 8.5 (L) 8.6 - 10.4 mg/dL    Sodium 133 (L) 135 - 144 mmol/L    Potassium 4.0 3.7 - 5.3 mmol/L    Chloride 96 (L) 98 - 107 mmol/L    CO2 25 20 - 31 mmol/L    Anion Gap 12 9 - 17 mmol/L    Alkaline Phosphatase 57 35 - 104 U/L    ALT 15 5 - 33 U/L    AST 30 <32 U/L    Total Bilirubin 0.49 0.3 - 1.2 mg/dL    Total Protein 6.3 (L) 6.4 - 8.3 g/dL    Alb 3.5 3.5 - 5.2 g/dL    Albumin/Globulin Ratio 1.3 1.0 - 2.5    GFR Non- 45 (L) >60 mL/min    GFR  55 (L) >60 mL/min    GFR Comment          GFR Staging NOT REPORTED    LIPASE   Result Value Ref Range    Lipase 79 (H) 13 - 60 U/L   Brain Natriuretic Peptide   Result Value Ref Range Pro-BNP 83 <300 pg/mL    BNP Interpretation         Urinalysis with Microscopic   Result Value Ref Range    Color, UA YELLOW YEL    Turbidity UA CLEAR CLEAR    Glucose, Ur NEGATIVE NEG    Bilirubin Urine NEGATIVE NEG    Ketones, Urine NEGATIVE NEG    Specific Gravity, UA 1.031 (H) 1.005 - 1.030    Urine Hgb LARGE (A) NEG    pH, UA 7.0 5.0 - 8.0    Protein, UA NEGATIVE NEG    Urobilinogen, Urine Normal NORM    Nitrite, Urine NEGATIVE NEG    Leukocyte Esterase, Urine NEGATIVE NEG    -          WBC, UA None 0 - 5 /HPF    RBC, UA 2 TO 5 0 - 2 /HPF    Casts UA NOT REPORTED 0 - 2 /LPF    Crystals UA NOT REPORTED NONE /HPF    Epithelial Cells UA 0 TO 2 0 - 5 /HPF    Renal Epithelial, Urine NOT REPORTED 0 /HPF    Bacteria, UA FEW (A) NONE    Mucus, UA NOT REPORTED NONE    Trichomonas, UA NOT REPORTED NONE    Amorphous, UA NOT REPORTED NONE    Other Observations UA NOT REPORTED NREQ    Yeast, UA NOT REPORTED NONE   LACTIC ACID, WHOLE BLOOD   Result Value Ref Range    Lactic Acid, Whole Blood 2.7 (H) 0.7 - 2.1 mmol/L   Protime-INR   Result Value Ref Range    Protime 10.3 9.0 - 12.0 sec    INR 1.0    APTT   Result Value Ref Range    PTT 19.3 (L) 20.5 - 30.5 sec   HEMOGLOBIN AND HEMATOCRIT, BLOOD   Result Value Ref Range    Hemoglobin 7.8 (L) 11.9 - 15.1 g/dL    Hematocrit 25.7 (L) 36.3 - 47.1 %   Urea nitrogen, urine   Result Value Ref Range    Urea Nitrogen, Ur 505 mg/dL   POCT troponin   Result Value Ref Range    POC Troponin I 0.00 0.00 - 0.10 ng/mL    POC Troponin Interp       The Troponin-I (POC) results cannot be compared to the Troponin-T results. POCT troponin   Result Value Ref Range    POC Troponin I 0.00 0.00 - 0.10 ng/mL    POC Troponin Interp       The Troponin-I (POC) results cannot be compared to the Troponin-T results.    TYPE AND SCREEN   Result Value Ref Range    Expiration Date 11/07/2018     Arm Band Number [id number]     ABO/Rh A POSITIVE     Antibody Screen NEGATIVE     Unit Number [id number] Product Code Leukocyte Reduced Red Cell     Unit Divison 0     Dispense Status ISSUED     Transfusion Status OK TO TRANSFUSE     Crossmatch Result COMPATIBLE        RADIOLOGY:  CT ABDOMEN PELVIS W IV CONTRAST   Preliminary Result   No acute abnormality. XR CHEST PORTABLE   Final Result   No evidence of acute cardiopulmonary disease. EKG  None     All EKG's are interpreted by the Emergency Department Physician who either signsor Co-signs this chart in the absence of a cardiologist.    BEDSIDE ULTRASOUND:  None     EMERGENCY DEPARTMENT COURSE/MDM:    ED Course as of Nov 04 1603   Sun Nov 04, 2018   1554 Spoke with GI who recommended H&H monitoring q6-8hours, protonix drip, and endoscopy tomorrow. Internal medicine consulted for admission.  [SM]      ED Course User Index  [SM] Song Ayon MD    Patient was admitted to Internal medicine for further management. Patient's hemoglobin dropped to 7.8 and blood was crossmatched and transfused. Patient admitted to internal medicine in stable condition. PROCEDURES:  None     CONSULTS:  None    CRITICAL CARE:  None     FINAL IMPRESSION      1. Upper GI bleed          DISPOSITION / PLAN     DISPOSITION    Admitted to Internal Medicine Service     PATIENT REFERRED TO:  No follow-up provider specified.     DISCHARGE MEDICATIONS:  New Prescriptions    No medications on file       Song Ayon MD  Emergency Medicine Resident    (Please note that portions of this note were completed witha voice recognition program.  Efforts were made to edit the dictations butoccasionally words are mis-transcribed.)        Song Ayon MD  Resident  11/04/18 1984

## 2018-11-05 ENCOUNTER — ANESTHESIA (OUTPATIENT)
Dept: OPERATING ROOM | Age: 63
DRG: 241 | End: 2018-11-05
Payer: MEDICARE

## 2018-11-05 ENCOUNTER — ANESTHESIA EVENT (OUTPATIENT)
Dept: OPERATING ROOM | Age: 63
DRG: 241 | End: 2018-11-05
Payer: MEDICARE

## 2018-11-05 VITALS — DIASTOLIC BLOOD PRESSURE: 81 MMHG | SYSTOLIC BLOOD PRESSURE: 115 MMHG | OXYGEN SATURATION: 99 %

## 2018-11-05 LAB
-: NORMAL
ABSOLUTE EOS #: 0.19 K/UL (ref 0–0.4)
ABSOLUTE IMMATURE GRANULOCYTE: 0.38 K/UL (ref 0–0.3)
ABSOLUTE LYMPH #: 1.72 K/UL (ref 1–4.8)
ABSOLUTE MONO #: 0.57 K/UL (ref 0.1–0.8)
AMMONIA: 53 UMOL/L (ref 11–51)
ANION GAP SERPL CALCULATED.3IONS-SCNC: 10 MMOL/L (ref 9–17)
BASOPHILS # BLD: 0 % (ref 0–2)
BASOPHILS ABSOLUTE: 0 K/UL (ref 0–0.2)
BUN BLDV-MCNC: 32 MG/DL (ref 8–23)
BUN/CREAT BLD: ABNORMAL (ref 9–20)
CALCIUM SERPL-MCNC: 7.9 MG/DL (ref 8.6–10.4)
CHLORIDE BLD-SCNC: 109 MMOL/L (ref 98–107)
CO2: 20 MMOL/L (ref 20–31)
CREAT SERPL-MCNC: 0.78 MG/DL (ref 0.5–0.9)
CULTURE: NORMAL
DIFFERENTIAL TYPE: ABNORMAL
EOSINOPHILS RELATIVE PERCENT: 1 % (ref 1–4)
FOLATE: >20 NG/ML
GFR AFRICAN AMERICAN: >60 ML/MIN
GFR NON-AFRICAN AMERICAN: >60 ML/MIN
GFR SERPL CREATININE-BSD FRML MDRD: ABNORMAL ML/MIN/{1.73_M2}
GFR SERPL CREATININE-BSD FRML MDRD: ABNORMAL ML/MIN/{1.73_M2}
GLUCOSE BLD-MCNC: 128 MG/DL (ref 70–99)
HCT VFR BLD CALC: 28.8 % (ref 36.3–47.1)
HEMOGLOBIN: 9.5 G/DL (ref 11.9–15.1)
IMMATURE GRANULOCYTES: 2 %
LYMPHOCYTES # BLD: 9 % (ref 24–44)
Lab: NORMAL
MCH RBC QN AUTO: 31.9 PG (ref 25.2–33.5)
MCHC RBC AUTO-ENTMCNC: 33 G/DL (ref 28.4–34.8)
MCV RBC AUTO: 96.6 FL (ref 82.6–102.9)
MONOCYTES # BLD: 3 % (ref 1–7)
MORPHOLOGY: ABNORMAL
NRBC AUTOMATED: 0.3 PER 100 WBC
NUCLEATED RED BLOOD CELLS: 1 PER 100 WBC
PDW BLD-RTO: 20.9 % (ref 11.8–14.4)
PLATELET # BLD: ABNORMAL K/UL (ref 138–453)
PLATELET ESTIMATE: ABNORMAL
PLATELET, FLUORESCENCE: NORMAL K/UL (ref 138–453)
PLATELET, IMMATURE FRACTION: NORMAL % (ref 1.1–10.3)
PMV BLD AUTO: ABNORMAL FL (ref 8.1–13.5)
POTASSIUM SERPL-SCNC: 5 MMOL/L (ref 3.7–5.3)
RBC # BLD: 2.98 M/UL (ref 3.95–5.11)
RBC # BLD: ABNORMAL 10*6/UL
REASON FOR REJECTION: NORMAL
SEG NEUTROPHILS: 85 % (ref 36–66)
SEGMENTED NEUTROPHILS ABSOLUTE COUNT: 16.24 K/UL (ref 1.8–7.7)
SODIUM BLD-SCNC: 139 MMOL/L (ref 135–144)
SPECIMEN DESCRIPTION: NORMAL
STATUS: NORMAL
VITAMIN B-12: 473 PG/ML (ref 232–1245)
WBC # BLD: 19.1 K/UL (ref 3.5–11.3)
WBC # BLD: ABNORMAL 10*3/UL
ZZ NTE CLEAN UP: ORDERED TEST: NORMAL
ZZ NTE WITH NAME CLEAN UP: SPECIMEN SOURCE: NORMAL

## 2018-11-05 PROCEDURE — 82607 VITAMIN B-12: CPT

## 2018-11-05 PROCEDURE — 82140 ASSAY OF AMMONIA: CPT

## 2018-11-05 PROCEDURE — 2000000000 HC ICU R&B

## 2018-11-05 PROCEDURE — 2580000003 HC RX 258: Performed by: STUDENT IN AN ORGANIZED HEALTH CARE EDUCATION/TRAINING PROGRAM

## 2018-11-05 PROCEDURE — 6360000002 HC RX W HCPCS: Performed by: STUDENT IN AN ORGANIZED HEALTH CARE EDUCATION/TRAINING PROGRAM

## 2018-11-05 PROCEDURE — 2580000003 HC RX 258: Performed by: INTERNAL MEDICINE

## 2018-11-05 PROCEDURE — G8979 MOBILITY GOAL STATUS: HCPCS

## 2018-11-05 PROCEDURE — 6360000002 HC RX W HCPCS: Performed by: EMERGENCY MEDICINE

## 2018-11-05 PROCEDURE — 97162 PT EVAL MOD COMPLEX 30 MIN: CPT

## 2018-11-05 PROCEDURE — 76937 US GUIDE VASCULAR ACCESS: CPT

## 2018-11-05 PROCEDURE — 2580000003 HC RX 258: Performed by: NURSE ANESTHETIST, CERTIFIED REGISTERED

## 2018-11-05 PROCEDURE — 80307 DRUG TEST PRSMV CHEM ANLYZR: CPT

## 2018-11-05 PROCEDURE — 51798 US URINE CAPACITY MEASURE: CPT

## 2018-11-05 PROCEDURE — G8988 SELF CARE GOAL STATUS: HCPCS

## 2018-11-05 PROCEDURE — 0W3P8ZZ CONTROL BLEEDING IN GASTROINTESTINAL TRACT, VIA NATURAL OR ARTIFICIAL OPENING ENDOSCOPIC: ICD-10-PCS | Performed by: INTERNAL MEDICINE

## 2018-11-05 PROCEDURE — 87641 MR-STAPH DNA AMP PROBE: CPT

## 2018-11-05 PROCEDURE — G8978 MOBILITY CURRENT STATUS: HCPCS

## 2018-11-05 PROCEDURE — 7100000001 HC PACU RECOVERY - ADDTL 15 MIN: Performed by: INTERNAL MEDICINE

## 2018-11-05 PROCEDURE — 3609013000 HC EGD TRANSORAL CONTROL BLEEDING ANY METHOD: Performed by: INTERNAL MEDICINE

## 2018-11-05 PROCEDURE — 36415 COLL VENOUS BLD VENIPUNCTURE: CPT

## 2018-11-05 PROCEDURE — 7100000000 HC PACU RECOVERY - FIRST 15 MIN: Performed by: INTERNAL MEDICINE

## 2018-11-05 PROCEDURE — 3700000000 HC ANESTHESIA ATTENDED CARE: Performed by: INTERNAL MEDICINE

## 2018-11-05 PROCEDURE — 3700000001 HC ADD 15 MINUTES (ANESTHESIA): Performed by: INTERNAL MEDICINE

## 2018-11-05 PROCEDURE — 82570 ASSAY OF URINE CREATININE: CPT

## 2018-11-05 PROCEDURE — 85025 COMPLETE CBC W/AUTO DIFF WBC: CPT

## 2018-11-05 PROCEDURE — 6360000002 HC RX W HCPCS: Performed by: INTERNAL MEDICINE

## 2018-11-05 PROCEDURE — C9113 INJ PANTOPRAZOLE SODIUM, VIA: HCPCS | Performed by: STUDENT IN AN ORGANIZED HEALTH CARE EDUCATION/TRAINING PROGRAM

## 2018-11-05 PROCEDURE — 97535 SELF CARE MNGMENT TRAINING: CPT

## 2018-11-05 PROCEDURE — 2720000010 HC SURG SUPPLY STERILE: Performed by: INTERNAL MEDICINE

## 2018-11-05 PROCEDURE — 2580000003 HC RX 258: Performed by: EMERGENCY MEDICINE

## 2018-11-05 PROCEDURE — 83605 ASSAY OF LACTIC ACID: CPT

## 2018-11-05 PROCEDURE — 85055 RETICULATED PLATELET ASSAY: CPT

## 2018-11-05 PROCEDURE — 85018 HEMOGLOBIN: CPT

## 2018-11-05 PROCEDURE — 51701 INSERT BLADDER CATHETER: CPT

## 2018-11-05 PROCEDURE — 99223 1ST HOSP IP/OBS HIGH 75: CPT | Performed by: INTERNAL MEDICINE

## 2018-11-05 PROCEDURE — 6360000002 HC RX W HCPCS: Performed by: NURSE ANESTHETIST, CERTIFIED REGISTERED

## 2018-11-05 PROCEDURE — 97530 THERAPEUTIC ACTIVITIES: CPT

## 2018-11-05 PROCEDURE — 85014 HEMATOCRIT: CPT

## 2018-11-05 PROCEDURE — 82746 ASSAY OF FOLIC ACID SERUM: CPT

## 2018-11-05 PROCEDURE — 6370000000 HC RX 637 (ALT 250 FOR IP): Performed by: INTERNAL MEDICINE

## 2018-11-05 PROCEDURE — 94640 AIRWAY INHALATION TREATMENT: CPT

## 2018-11-05 PROCEDURE — G8987 SELF CARE CURRENT STATUS: HCPCS

## 2018-11-05 PROCEDURE — 6360000002 HC RX W HCPCS

## 2018-11-05 PROCEDURE — C9113 INJ PANTOPRAZOLE SODIUM, VIA: HCPCS | Performed by: INTERNAL MEDICINE

## 2018-11-05 PROCEDURE — 87040 BLOOD CULTURE FOR BACTERIA: CPT

## 2018-11-05 PROCEDURE — 94760 N-INVAS EAR/PLS OXIMETRY 1: CPT

## 2018-11-05 PROCEDURE — 2700000000 HC OXYGEN THERAPY PER DAY

## 2018-11-05 PROCEDURE — 99255 IP/OBS CONSLTJ NEW/EST HI 80: CPT | Performed by: INTERNAL MEDICINE

## 2018-11-05 PROCEDURE — 0DJ08ZZ INSPECTION OF UPPER INTESTINAL TRACT, VIA NATURAL OR ARTIFICIAL OPENING ENDOSCOPIC: ICD-10-PCS | Performed by: INTERNAL MEDICINE

## 2018-11-05 PROCEDURE — 80048 BASIC METABOLIC PNL TOTAL CA: CPT

## 2018-11-05 PROCEDURE — 97166 OT EVAL MOD COMPLEX 45 MIN: CPT

## 2018-11-05 RX ORDER — LORAZEPAM 2 MG/ML
3 INJECTION INTRAMUSCULAR
Status: DISCONTINUED | OUTPATIENT
Start: 2018-11-05 | End: 2018-11-06

## 2018-11-05 RX ORDER — EPINEPHRINE 1 MG/ML
INJECTION, SOLUTION, CONCENTRATE INTRAVENOUS PRN
Status: DISCONTINUED | OUTPATIENT
Start: 2018-11-05 | End: 2018-11-05 | Stop reason: HOSPADM

## 2018-11-05 RX ORDER — THIAMINE HYDROCHLORIDE 100 MG/ML
100 INJECTION, SOLUTION INTRAMUSCULAR; INTRAVENOUS DAILY
Status: DISCONTINUED | OUTPATIENT
Start: 2018-11-06 | End: 2018-11-06

## 2018-11-05 RX ORDER — FUROSEMIDE 10 MG/ML
20 INJECTION INTRAMUSCULAR; INTRAVENOUS ONCE
Status: COMPLETED | OUTPATIENT
Start: 2018-11-05 | End: 2018-11-05

## 2018-11-05 RX ORDER — LORAZEPAM 2 MG/1
4 TABLET ORAL
Status: DISCONTINUED | OUTPATIENT
Start: 2018-11-05 | End: 2018-11-06

## 2018-11-05 RX ORDER — LORAZEPAM 2 MG/ML
0.5 INJECTION INTRAMUSCULAR ONCE
Status: COMPLETED | OUTPATIENT
Start: 2018-11-05 | End: 2018-11-05

## 2018-11-05 RX ORDER — LORAZEPAM 2 MG/ML
2 INJECTION INTRAMUSCULAR
Status: DISCONTINUED | OUTPATIENT
Start: 2018-11-05 | End: 2018-11-06

## 2018-11-05 RX ORDER — LORAZEPAM 2 MG/ML
1 INJECTION INTRAMUSCULAR
Status: DISCONTINUED | OUTPATIENT
Start: 2018-11-05 | End: 2018-11-06

## 2018-11-05 RX ORDER — LORAZEPAM 2 MG/ML
INJECTION INTRAMUSCULAR
Status: COMPLETED
Start: 2018-11-05 | End: 2018-11-05

## 2018-11-05 RX ORDER — PROPOFOL 10 MG/ML
INJECTION, EMULSION INTRAVENOUS PRN
Status: DISCONTINUED | OUTPATIENT
Start: 2018-11-05 | End: 2018-11-05 | Stop reason: SDUPTHER

## 2018-11-05 RX ORDER — LACTULOSE 10 G/15ML
10 SOLUTION ORAL 3 TIMES DAILY
Status: DISCONTINUED | OUTPATIENT
Start: 2018-11-05 | End: 2018-11-07

## 2018-11-05 RX ORDER — LORAZEPAM 2 MG/ML
4 INJECTION INTRAMUSCULAR
Status: DISCONTINUED | OUTPATIENT
Start: 2018-11-05 | End: 2018-11-06

## 2018-11-05 RX ORDER — LORAZEPAM 2 MG/1
2 TABLET ORAL
Status: DISCONTINUED | OUTPATIENT
Start: 2018-11-05 | End: 2018-11-06

## 2018-11-05 RX ORDER — SODIUM CHLORIDE 0.9 % (FLUSH) 0.9 %
10 SYRINGE (ML) INJECTION PRN
Status: DISCONTINUED | OUTPATIENT
Start: 2018-11-05 | End: 2018-11-13 | Stop reason: HOSPADM

## 2018-11-05 RX ORDER — CIPROFLOXACIN 2 MG/ML
400 INJECTION, SOLUTION INTRAVENOUS EVERY 12 HOURS
Status: DISCONTINUED | OUTPATIENT
Start: 2018-11-05 | End: 2018-11-06

## 2018-11-05 RX ORDER — FOLIC ACID 5 MG/ML
1 INJECTION, SOLUTION INTRAMUSCULAR; INTRAVENOUS; SUBCUTANEOUS DAILY
Status: DISCONTINUED | OUTPATIENT
Start: 2018-11-06 | End: 2018-11-05

## 2018-11-05 RX ORDER — SODIUM CHLORIDE 0.9 % (FLUSH) 0.9 %
10 SYRINGE (ML) INJECTION EVERY 12 HOURS SCHEDULED
Status: DISCONTINUED | OUTPATIENT
Start: 2018-11-05 | End: 2018-11-13 | Stop reason: HOSPADM

## 2018-11-05 RX ORDER — LACTULOSE 10 G/15ML
20 SOLUTION ORAL 3 TIMES DAILY
Status: DISCONTINUED | OUTPATIENT
Start: 2018-11-05 | End: 2018-11-05

## 2018-11-05 RX ORDER — LORAZEPAM 1 MG/1
1 TABLET ORAL
Status: DISCONTINUED | OUTPATIENT
Start: 2018-11-05 | End: 2018-11-06

## 2018-11-05 RX ORDER — SODIUM CHLORIDE 9 MG/ML
INJECTION, SOLUTION INTRAVENOUS CONTINUOUS PRN
Status: DISCONTINUED | OUTPATIENT
Start: 2018-11-05 | End: 2018-11-05 | Stop reason: SDUPTHER

## 2018-11-05 RX ADMIN — OCTREOTIDE ACETATE 50 MCG/HR: 500 INJECTION, SOLUTION INTRAVENOUS; SUBCUTANEOUS at 09:32

## 2018-11-05 RX ADMIN — SODIUM CHLORIDE: 9 INJECTION, SOLUTION INTRAVENOUS at 17:39

## 2018-11-05 RX ADMIN — Medication 10 ML: at 21:22

## 2018-11-05 RX ADMIN — LORAZEPAM 0.5 MG: 2 INJECTION INTRAMUSCULAR at 16:42

## 2018-11-05 RX ADMIN — CIPROFLOXACIN 400 MG: 2 INJECTION, SOLUTION INTRAVENOUS at 13:00

## 2018-11-05 RX ADMIN — PROPOFOL 50 MG: 10 INJECTION, EMULSION INTRAVENOUS at 17:43

## 2018-11-05 RX ADMIN — IPRATROPIUM BROMIDE AND ALBUTEROL SULFATE 3 ML: .5; 3 SOLUTION RESPIRATORY (INHALATION) at 15:18

## 2018-11-05 RX ADMIN — SODIUM CHLORIDE 8 MG/HR: 9 INJECTION, SOLUTION INTRAVENOUS at 09:32

## 2018-11-05 RX ADMIN — LORAZEPAM 1 MG: 2 INJECTION INTRAMUSCULAR; INTRAVENOUS at 22:31

## 2018-11-05 RX ADMIN — SODIUM CHLORIDE: 9 INJECTION, SOLUTION INTRAVENOUS at 09:33

## 2018-11-05 RX ADMIN — SODIUM CHLORIDE 8 MG/HR: 9 INJECTION, SOLUTION INTRAVENOUS at 20:35

## 2018-11-05 RX ADMIN — SODIUM CHLORIDE: 9 INJECTION, SOLUTION INTRAVENOUS at 21:23

## 2018-11-05 RX ADMIN — LORAZEPAM 3 MG: 2 INJECTION INTRAMUSCULAR; INTRAVENOUS at 23:23

## 2018-11-05 RX ADMIN — IPRATROPIUM BROMIDE AND ALBUTEROL SULFATE 3 ML: .5; 3 SOLUTION RESPIRATORY (INHALATION) at 11:38

## 2018-11-05 RX ADMIN — FUROSEMIDE 20 MG: 10 INJECTION, SOLUTION INTRAMUSCULAR; INTRAVENOUS at 10:56

## 2018-11-05 RX ADMIN — HYDROCORTISONE SODIUM SUCCINATE 100 MG: 100 INJECTION, POWDER, FOR SOLUTION INTRAMUSCULAR; INTRAVENOUS at 05:34

## 2018-11-05 RX ADMIN — IPRATROPIUM BROMIDE AND ALBUTEROL SULFATE 3 ML: .5; 3 SOLUTION RESPIRATORY (INHALATION) at 07:57

## 2018-11-05 RX ADMIN — OCTREOTIDE ACETATE 50 MCG/HR: 500 INJECTION, SOLUTION INTRAVENOUS; SUBCUTANEOUS at 10:56

## 2018-11-05 RX ADMIN — PHENYLEPHRINE HYDROCHLORIDE 100 MCG: 10 INJECTION INTRAVENOUS at 17:48

## 2018-11-05 RX ADMIN — PROPOFOL 50 MG: 10 INJECTION, EMULSION INTRAVENOUS at 17:53

## 2018-11-05 RX ADMIN — HYDROCORTISONE SODIUM SUCCINATE 100 MG: 100 INJECTION, POWDER, FOR SOLUTION INTRAMUSCULAR; INTRAVENOUS at 13:01

## 2018-11-05 RX ADMIN — MOMETASONE FUROATE AND FORMOTEROL FUMARATE DIHYDRATE 2 PUFF: 100; 5 AEROSOL RESPIRATORY (INHALATION) at 07:57

## 2018-11-05 RX ADMIN — LORAZEPAM 0.5 MG: 2 INJECTION INTRAMUSCULAR; INTRAVENOUS at 16:42

## 2018-11-05 RX ADMIN — ONDANSETRON 4 MG: 2 INJECTION INTRAMUSCULAR; INTRAVENOUS at 05:31

## 2018-11-05 RX ADMIN — SODIUM CHLORIDE 8 MG/HR: 9 INJECTION, SOLUTION INTRAVENOUS at 10:55

## 2018-11-05 RX ADMIN — ONDANSETRON 4 MG: 2 INJECTION INTRAMUSCULAR; INTRAVENOUS at 16:11

## 2018-11-05 ASSESSMENT — PULMONARY FUNCTION TESTS
PIF_VALUE: 0
PIF_VALUE: 2
PIF_VALUE: 0
PIF_VALUE: 2
PIF_VALUE: 2
PIF_VALUE: 0
PIF_VALUE: 0
PIF_VALUE: 2
PIF_VALUE: 0
PIF_VALUE: 1

## 2018-11-05 ASSESSMENT — ENCOUNTER SYMPTOMS: SHORTNESS OF BREATH: 0

## 2018-11-05 NOTE — PROGRESS NOTES
buckling noted during functional mobility   Strength LLE  Strength LLE: WFL  Comment: Strength not formally assessed- pt refuses formal strength testing, active movement noted at all joints against gravity, no buckling noted during functional mobility   Strength RUE  Comment: See OT assessment- PT/OT coeval  Strength LUE  Comment: See OT assessment- PT/OT coeval  Tone RLE  RLE Tone: Normotonic  Tone LLE  LLE Tone: Normotonic  Motor Control  Gross Motor?: WFL  Sensation  Overall Sensation Status: WFL  Bed mobility  Supine to Sit: Contact guard assistance  Sit to Supine: Contact guard assistance  Scooting: Contact guard assistance  Comment: CGA provided for safety. Pt is impulsive and demos slight decrease in safety awareness   Transfers  Sit to Stand: Contact guard assistance  Stand to sit: Contact guard assistance  Comment: CGA provided for safety, pt impulsive during transfers and demo decreased safety awareness  Ambulation  Ambulation?: Yes  Ambulation 1  Surface: level tile  Device: Rolling Walker  Other Apparatus: O2 (2L)  Assistance: Contact guard assistance  Quality of Gait: Short step length, mild lateral sway, narrow NERY  Distance: 5ft  Comments: Mod vc's needed for sequencing and safety-fair return demo. Amb distance limited d/t pts willingness to amb further distances. Pt demos impulsiveness during amb   Stairs/Curb  Stairs?: No     Balance  Posture: Fair  Sitting - Static: Good;-  Sitting - Dynamic: Good;-  Standing - Static: Fair;+  Standing - Dynamic: Fair;+  Comments: Pt sat EOB ~5 min CGA for safety, standing balance assessed w/ RW        Assessment   Body structures, Functions, Activity limitations: Decreased functional mobility ; Decreased ROM; Decreased strength;Decreased safe awareness;Decreased endurance;Decreased balance  Assessment: Pt amb 5ft w/ RW CGA, amb assessment limited d/t pts refusal to walk further; Recommending pt return home to son's house w/ 24hr assist. If family is unable to provide 24hr support, recommending SNF placement to maximize pt safety and functional mobility   Prognosis: Fair  Decision Making: Medium Complexity  Patient Education: purpose of PT eval; POC; importance of mobility   REQUIRES PT FOLLOW UP: Yes  Activity Tolerance  Activity Tolerance: Patient limited by fatigue;Patient limited by endurance; Patient limited by cognitive status         Plan   Plan  Times per week: 5-6x  Current Treatment Recommendations: Strengthening, Balance Training, Functional Mobility Training, Transfer Training, Neuromuscular Re-education, Endurance Training, Gait Training, Home Exercise Program, Safety Education & Training, Patient/Caregiver Education & Training  Safety Devices  Type of devices: Bed alarm in place, Call light within reach, Gait belt, Patient at risk for falls, Left in bed, Nurse notified  Restraints  Initially in place: No    G-Code  PT G-Codes  Functional Limitation: Mobility: Walking and moving around  Mobility: Walking and Moving Around Current Status (): At least 40 percent but less than 60 percent impaired, limited or restricted  Mobility: Walking and Moving Around Goal Status ():  At least 1 percent but less than 20 percent impaired, limited or restricted  OutComes Score                                           AM-PAC Score     AM-PAC Inpatient Mobility without Stair Climbing Raw Score : 15  AM-PAC Inpatient without Stair Climbing T-Scale Score : 43.03  Mobility Inpatient CMS 0-100% Score: 47.43  Mobility Inpatient without Stair CMS G-Code Modifier : CK       Goals  Short term goals  Time Frame for Short term goals: 15  Short term goal 1: Pt to demo bed mobility w/ supervision  Short term goal 2: Pt to perform functional transfers w/ supervision  Short term goal 3: Pt to amb 300ft w/ least restricitive AD w/ supervision  Short term goal 4: Tolerate 45 minutes of therapy to demo increased endurance  Patient Goals   Patient goals : DId not state       Therapy

## 2018-11-05 NOTE — PROGRESS NOTES
Perfect serve received for patient going for EGD. Was agitated before going, received one time dose of ativan. Per charts pt h/o substance abuse. Altered.     A/P  Urine Drug screen      Sima Aguirre MD  PGY-2, Internal Medicine  [facility]

## 2018-11-05 NOTE — OP NOTE
EPI 1/[id number] 4 cc, then placed 2 endo clips , hemostasis accomplished     Fresh blood in stomach cleaned , no other bleeding source    distal esophageal thick stricture, no esophageal varices   no gastric varices     The scope was removed and the patient tolerated the procedure well. Recommendations/Plan:   1. ICU monitoring  2. PPI GGT  3. Call if re bleeding   4. ETOH withdrawal prophylaxis  5.  consider ct brain  6.  sepsis w/u  7.  antibiotics coverage fo now   8. F/U In Office in 3-4 weeks  9.  Discussed with the family    Electronically signed by Jared Resendez MD  on 11/5/2018 at 6:01 PM

## 2018-11-05 NOTE — PROGRESS NOTES
[place] [facility] Upper [facility] [K92.2] . Vitals:    11/04/18 2130   BP: (!) 89/53   Pulse: 84   Resp:    Temp:    SpO2:    . Patients home meds are   Prior to Admission medications    Medication Sig Start Date End Date Taking? Authorizing Provider   ipratropium-albuterol (DUONEB) 0.5-2.5 (3) MG/3ML SOLN nebulizer solution Inhale 3 mLs into the lungs every 4 hours 10/25/18   Lauren Brown MD   mometasone-formoterol (DULERA) 100-5 MCG/ACT inhaler Inhale 2 puffs into the lungs 2 times daily 10/25/18   Lauren Brown MD   predniSONE (DELTASONE) 10 MG tablet Take 2 tablets by mouth daily for 3 days 11/1/18 11/4/18  Lauren Brown MD   predniSONE (DELTASONE) 10 MG tablet Take 1 tablet by mouth daily for 3 doses 11/4/18 11/7/18  Lauren Brown MD   carvedilol (COREG) 12.5 MG tablet Take 1 tablet by mouth 2 times daily (with meals) 10/25/18   Lauren Brown MD   folic acid (FOLVITE) 1 MG tablet Take 1 tablet by mouth daily 10/13/18   Lauren Brown MD   furosemide (LASIX) 40 MG tablet Take 1 tablet by mouth daily 10/12/18   Lauren Brown MD   vitamin B-1 100 MG tablet Take 1 tablet by mouth daily 10/13/18   Lauren Brown MD   vitamin B-12 250 MCG tablet Take 1 tablet by mouth daily 10/13/18   Lauren Brown MD   docusate sodium (COLACE) 100 MG capsule Take 1 capsule by mouth 2 times daily 10/12/18   Lauren Brown MD   gabapentin (NEURONTIN) 300 MG capsule Take 300 mg by mouth 3 times daily. Kendra Duarte Historical Provider, MD   buprenorphine-naloxone (SUBOXONE) 12-3 MG sublingual film Place 1 Film under the tongue daily. Kendra Duarte     Historical MD Marnie   pantoprazole (PROTONIX) 40 MG tablet Take 1 tablet by mouth every morning (before breakfast) 9/14/18   Dom Shipley MD   guaiFENesin (MUCINEX) 600 MG extended release tablet Take 1 tablet by mouth 2 times daily 9/13/18   Dom Shipley MD   hydrochlorothiazide (MICROZIDE) 12.5 MG capsule Take

## 2018-11-05 NOTE — ANESTHESIA PRE PROCEDURE
Department of Anesthesiology  Preprocedure Note       Name:  Therisa Essex   Age:  70 y.o.  :  1955                                          MRN:  [id number]         Date:  2018      Surgeon: Fiona Sabillon):  Eduard Beltran MD    Procedure: EGD ESOPHAGOGASTRODUODENOSCOPY (N/A )    Medications prior to admission:   Prior to Admission medications    Medication Sig Start Date End Date Taking? Authorizing Provider   ipratropium-albuterol (DUONEB) 0.5-2.5 (3) MG/3ML SOLN nebulizer solution Inhale 3 mLs into the lungs every 4 hours 10/25/18   Joleen Hernandez MD   mometasone-formoterol (DULERA) 100-5 MCG/ACT inhaler Inhale 2 puffs into the lungs 2 times daily 10/25/18   Joleen Hernandez MD   predniSONE (DELTASONE) 10 MG tablet Take 1 tablet by mouth daily for 3 doses 18  Joleen Hernandez MD   carvedilol (COREG) 12.5 MG tablet Take 1 tablet by mouth 2 times daily (with meals) 10/25/18   Joleen Hernandez MD   folic acid (FOLVITE) 1 MG tablet Take 1 tablet by mouth daily 10/13/18   Joleen Hernandez MD   furosemide (LASIX) 40 MG tablet Take 1 tablet by mouth daily 10/12/18   Joleen Hernandez MD   vitamin B-1 100 MG tablet Take 1 tablet by mouth daily 10/13/18   Joleen Hernandez MD   vitamin B-12 250 MCG tablet Take 1 tablet by mouth daily 10/13/18   Joleen Hernandez MD   docusate sodium (COLACE) 100 MG capsule Take 1 capsule by mouth 2 times daily 10/12/18   Joleen Hernandez MD   gabapentin (NEURONTIN) 300 MG capsule Take 300 mg by mouth 3 times daily. Emily Dye MD   buprenorphine-naloxone (SUBOXONE) 12-3 MG sublingual film Place 1 Film under the tongue daily. Emily Dye MD   pantoprazole (PROTONIX) 40 MG tablet Take 1 tablet by mouth every morning (before breakfast) 18   Foreign Flores MD   guaiFENesin (MUCINEX) 600 MG extended release tablet Take 1 tablet by mouth 2 times daily 18   Foreign Flores MD   hydrochlorothiazide (MICROZIDE) 12.5 MG capsule Take 12.5 mg by mouth every morning    Historical Provider, MD   albuterol sulfate HFA (PROVENTIL HFA) 108 (90 Base) MCG/ACT inhaler Inhale 1-2 puffs into the lungs every 4 hours as needed for Wheezing or Shortness of Breath (Space out to every 6 hours as symptoms improve) 8/31/18   JAYDON Henriquez CNP   acetaminophen (APAP EXTRA STRENGTH) 500 MG tablet Take 2 tablets by mouth every 8 hours as needed for Pain 8/12/18   Chelsey Mancia MD   lisinopril (PRINIVIL;ZESTRIL) 10 MG tablet Take 1 tablet by mouth daily 8/12/18   Chelsey Mancia MD   Multiple Vitamins-Minerals (THERAPEUTIC MULTIVITAMIN-MINERALS) tablet Take 1 tablet by mouth daily 8/12/18   Chelsey Mancia MD       Current medications:    Current Facility-Administered Medications   Medication Dose Route Frequency Provider Last Rate Last Dose    octreotide (SANDOSTATIN) 500 mcg in sodium chloride 0.9 % 100 mL infusion  50 mcg/hr Intravenous Continuous JAYDON Luna CNP 10 mL/hr at 11/05/18 1056 50 mcg/hr at 11/05/18 1056    ciprofloxacin (CIPRO) IVPB 400 mg  400 mg Intravenous Q12H Florence Waldron MD   Stopped at 11/05/18 1400    lactulose (CHRONULAC) 10 GM/15ML solution 10 g  10 g Oral TID Lorena Perry MD   Stopped at 11/05/18 1611    pantoprazole (PROTONIX) 80 mg in sodium chloride 0.9 % 100 mL infusion  8 mg/hr Intravenous Continuous Yael Rashid MD 10 mL/hr at 11/05/18 1055 8 mg/hr at 11/05/18 1055    gabapentin (NEURONTIN) capsule 300 mg  300 mg Oral TID Florence Waldron MD   Stopped at 11/05/18 0945    guaiFENesin (MUCINEX) extended release tablet 600 mg  600 mg Oral BID Florence Waldron MD   Stopped at 11/05/18 0945    mometasone-formoterol (DULERA) 100-5 MCG/ACT inhaler 2 puff  2 puff Inhalation BID Florence Waldron MD   2 puff at 11/05/18 0757    therapeutic multivitamin-minerals 1 tablet  1 tablet Oral Daily Florence Waldron MD        vitamin B-1 (THIAMINE) tablet 100 mg  100 mg Oral Daily Florence Waldron MD   Stopped at 11/05/18 0946    vitamin B-12 (CYANOCOBALAMIN) tablet 250 mcg  250 mcg Oral Daily Sulaiman Gresham MD   Stopped at 11/05/18 0946    sodium chloride flush 0.9 % injection 10 mL  10 mL Intravenous 2 times per day Sulaiman Gresham MD        sodium chloride flush 0.9 % injection 10 mL  10 mL Intravenous PRN Sulaiman Gresham MD        magnesium hydroxide (MILK OF MAGNESIA) 400 MG/5ML suspension 30 mL  30 mL Oral Daily PRN Sulaiman Gresham MD        ondansetron [facility]) injection 4 mg  4 mg Intravenous Q6H PRN Sulaiman Gresham MD   4 mg at 11/05/18 1611    potassium chloride (KLOR-CON M) extended release tablet 40 mEq  40 mEq Oral PRN Sulaiman Gresham MD        Or    potassium chloride 20 MEQ/15ML (10%) oral solution 40 mEq  40 mEq Oral PRN Sulaiman Gresham MD        Or    potassium chloride 10 mEq/100 mL IVPB (Peripheral Line)  10 mEq Intravenous PRN Sulaiman Gresham MD        nicotine (NICODERM CQ) 14 MG/24HR 1 patch  1 patch Transdermal PRN Sulaiman Gresham MD        hydrocortisone sodium succinate PF (SOLU-CORTEF) injection 100 mg  100 mg Intravenous Q8H Sulaiman Gresham MD   100 mg at 11/05/18 1301    0.9 % sodium chloride infusion   Intravenous Continuous Sulaiman Gresham MD   Stopped at 11/05/18 0952    ipratropium-albuterol (DUONEB) nebulizer solution 3 mL  3 mL Inhalation 4x daily Ismael Johns MD   3 mL at 11/05/18 1518    albuterol (PROVENTIL) nebulizer solution 2.5 mg  2.5 mg Nebulization Q6H PRN Ismael Johns MD           Allergies:     Allergies   Allergen Reactions    Aspirin Itching    Keflet [Cephalexin]     Motrin [Ibuprofen]        Problem List:    Patient Active Problem List   Diagnosis Code    COPD exacerbation ([facility] J44.1    Hypertension I10    Hepatitis C B19.20    History of substance abuse Z87.898    Chronic heart failure with reduced ejection fraction and diastolic dysfunction (HCC) I50.42    Decompensated COPD with exacerbation (chronic obstructive pulmonary disease) ([facility]

## 2018-11-05 NOTE — PROGRESS NOTES
Therapy  O2 Device: Nasal cannula  O2 Flow Rate (L/min): 3 L/min     Social/Functional History  Social/Functional History  Lives With: Alone  Type of Home: Apartment  Home Layout: Multi-level (3rd floor apartment)  Home Access: Level entry  Bathroom Shower/Tub: Tub/Shower unit  Bathroom Equipment: Grab bars in shower  Home Equipment:  (pull cord in bathroom)  Receives Help From: Family  ADL Assistance: Independent  Homemaking Assistance: Independent  Homemaking Responsibilities: Yes  Ambulation Assistance: Independent  Transfer Assistance: Independent  Active : No  Mode of Transportation: Family, Cab  Occupation: On disability  Additional Comments: above information obtained from pt previous visit. Pt reported has been staying with son since last admission. Pt questionable historian due to slight confusion throughout session. Objective   Vision: Within Functional Limits  Hearing: Within functional limits    Orientation  Overall Orientation Status: Within Functional Limits     Balance  Sitting Balance: Supervision (~10 minutes on EOB )  Standing Balance: Contact guard assistance (with RW )  Standing Balance  Time: ~2 minutes  Activity: pt stood next to bed and attempted to walk aware from bed without assistance. Pt reported was going to go get water. Pt prompted to sit back on bed due to slight unsteadiness and impulsivity   Sit to stand: Contact guard assistance  Stand to sit: Contact guard assistance     ADL  Feeding: Modified independent   Grooming: Supervision;Verbal cueing  UE Bathing: Minimal assistance;Verbal cueing  LE Bathing: Moderate assistance;Verbal cueing  UE Dressing: Minimal assistance;Verbal cueing  LE Dressing: Moderate assistance;Verbal cueing  Toileting:  Moderate assistance    RUE Tone: Normotonic  LUE Tone: Normotonic    Coordination  Movements Are Fluid And Coordinated: Yes     Bed mobility  Supine to Sit: Minimal assistance  Sit to Supine: Minimal assistance (for leg movement )  Scooting: Contact guard assistance  Transfers  Sit to stand: Contact guard assistance  Stand to sit: Contact guard assistance  Transfer Comments: pt slightly impulsive and stood up without warning. Cognition  Overall Cognitive Status: Exceptions  Following Commands: Follows one step commands with increased time  Attention Span: Attends with cues to redirect (very focused on wanting water througout session)  Safety Judgement: Decreased awareness of need for assistance;Decreased awareness of need for safety (pt stood without warning and tried to walk away from bed without assistance. Pt reminded of need for assistance and was returned to sitting on EOB for safety )  Initiation: Requires cues for some     Sensation  Overall Sensation Status: WFL      LUE AROM : WFL  LUE General AROM: observed through functional movements     Left Hand AROM: WFL    RUE AROM : WFL  RUE General AROM: observed through functional movements     Right Hand AROM: WFL    LUE Strength  Gross LUE Strength: Exceptions to [facility]  L Hand Grasp: 3/5  LUE Strength Comment: overall muscle strength 3/5. unsure if pt unstood direction of formal MMT but able to move UE against gravity   RUE Strength  Gross RUE Strength: Exceptions to [facility]  R Hand Grasp: 3/5  RUE Strength Comment: overall muscle strength 3/5. unsure if pt unstood direction of formal MMT but able to move UE against gravity      Assessment   Performance deficits / Impairments: Decreased functional mobility ; Decreased balance;Decreased safe awareness;Decreased high-level IADLs;Decreased endurance;Decreased ADL status  Treatment Diagnosis: GI bleed   Prognosis: Good  Decision Making: Medium Complexity  Patient Education: pt ed on POC, safety during functional transfers/functional mobility, need for assistance and purpose of therapy.  fair return   REQUIRES OT FOLLOW UP: Yes  Activity Tolerance  Activity Tolerance: Treatment limited secondary to decreased cognition  Safety Devices  Safety

## 2018-11-05 NOTE — PROGRESS NOTES
Advise Dr Carin Tadeo the following: Her most recent BP was 110/62, hemoglobin 8.6, she urinated and it is a clear red with a few blood clots. She also just had a bowel movement that was dark and loose. Writer will continue to monitor patient.

## 2018-11-05 NOTE — PROGRESS NOTES
Family members present at bedside state that the patient seems slightly altered from baseline mentation. Will obtain Ammonia level and continue to monitor for signs of alcohol withdrawal. UA negative for infection and CXR unremarkable. GI consulted and started octreotide for possible variceal bleed. No signs of cirrhosis on lab work or imaging at this time. However, will start antimicrobials for possible variceal bleed. Discussed plan with family members present at bedside. Jeny Fletcher MD  Internal Medicine Resident, PGY-1  [address], [place]  11/5/2018, 11:35 AM

## 2018-11-06 LAB
-: NORMAL
ABSOLUTE EOS #: 0 K/UL (ref 0–0.4)
ABSOLUTE EOS #: 0 K/UL (ref 0–0.4)
ABSOLUTE IMMATURE GRANULOCYTE: 0.24 K/UL (ref 0–0.3)
ABSOLUTE IMMATURE GRANULOCYTE: 0.56 K/UL (ref 0–0.3)
ABSOLUTE LYMPH #: 1.11 K/UL (ref 1–4.8)
ABSOLUTE LYMPH #: 2.2 K/UL (ref 1–4.8)
ABSOLUTE MONO #: 0.98 K/UL (ref 0.1–0.8)
ABSOLUTE MONO #: 1.11 K/UL (ref 0.1–0.8)
AMPHETAMINE SCREEN URINE: NEGATIVE
ANION GAP SERPL CALCULATED.3IONS-SCNC: 6 MMOL/L (ref 9–17)
ANION GAP SERPL CALCULATED.3IONS-SCNC: 7 MMOL/L (ref 9–17)
BARBITURATE SCREEN URINE: NEGATIVE
BASOPHILS # BLD: 0 % (ref 0–2)
BASOPHILS # BLD: 0 % (ref 0–2)
BASOPHILS ABSOLUTE: 0 K/UL (ref 0–0.2)
BASOPHILS ABSOLUTE: 0 K/UL (ref 0–0.2)
BENZODIAZEPINE SCREEN, URINE: NEGATIVE
BUN BLDV-MCNC: 24 MG/DL (ref 8–23)
BUN BLDV-MCNC: 31 MG/DL (ref 8–23)
BUN/CREAT BLD: ABNORMAL (ref 9–20)
BUN/CREAT BLD: ABNORMAL (ref 9–20)
BUPRENORPHINE URINE: NORMAL
CALCIUM SERPL-MCNC: 7.1 MG/DL (ref 8.6–10.4)
CALCIUM SERPL-MCNC: 7.2 MG/DL (ref 8.6–10.4)
CANNABINOID SCREEN URINE: NEGATIVE
CHLORIDE BLD-SCNC: 111 MMOL/L (ref 98–107)
CHLORIDE BLD-SCNC: 115 MMOL/L (ref 98–107)
CO2: 21 MMOL/L (ref 20–31)
CO2: 23 MMOL/L (ref 20–31)
COCAINE METABOLITE, URINE: NEGATIVE
CREAT SERPL-MCNC: 0.76 MG/DL (ref 0.5–0.9)
CREAT SERPL-MCNC: 0.92 MG/DL (ref 0.5–0.9)
CREATININE URINE: 66.9 MG/DL (ref 28–217)
DIFFERENTIAL TYPE: ABNORMAL
DIFFERENTIAL TYPE: ABNORMAL
EOSINOPHILS RELATIVE PERCENT: 0 % (ref 1–4)
EOSINOPHILS RELATIVE PERCENT: 0 % (ref 1–4)
GFR AFRICAN AMERICAN: >60 ML/MIN
GFR AFRICAN AMERICAN: >60 ML/MIN
GFR NON-AFRICAN AMERICAN: >60 ML/MIN
GFR NON-AFRICAN AMERICAN: >60 ML/MIN
GFR SERPL CREATININE-BSD FRML MDRD: ABNORMAL ML/MIN/{1.73_M2}
GLUCOSE BLD-MCNC: 172 MG/DL (ref 70–99)
GLUCOSE BLD-MCNC: 176 MG/DL (ref 70–99)
HCT VFR BLD CALC: 12.8 % (ref 36.3–47.1)
HCT VFR BLD CALC: 22.2 % (ref 36.3–47.1)
HCT VFR BLD CALC: 23.3 % (ref 36.3–47.1)
HCT VFR BLD CALC: 24.1 % (ref 36.3–47.1)
HEMOGLOBIN: 3.9 G/DL (ref 11.9–15.1)
HEMOGLOBIN: 7.4 G/DL (ref 11.9–15.1)
HEMOGLOBIN: 7.6 G/DL (ref 11.9–15.1)
HEMOGLOBIN: 7.8 G/DL (ref 11.9–15.1)
IMMATURE GRANULOCYTES: 1 %
IMMATURE GRANULOCYTES: 3 %
LACTIC ACID, SEPSIS WHOLE BLOOD: 2.4 MMOL/L (ref 0.5–1.9)
LACTIC ACID, SEPSIS: ABNORMAL MMOL/L (ref 0.5–1.9)
LYMPHOCYTES # BLD: 6 % (ref 24–44)
LYMPHOCYTES # BLD: 9 % (ref 24–44)
MCH RBC QN AUTO: 30.8 PG (ref 25.2–33.5)
MCH RBC QN AUTO: 32 PG (ref 25.2–33.5)
MCHC RBC AUTO-ENTMCNC: 30.5 G/DL (ref 28.4–34.8)
MCHC RBC AUTO-ENTMCNC: 32.4 G/DL (ref 28.4–34.8)
MCV RBC AUTO: 104.9 FL (ref 82.6–102.9)
MCV RBC AUTO: 95.3 FL (ref 82.6–102.9)
MDMA URINE: NORMAL
METHADONE SCREEN, URINE: NEGATIVE
METHAMPHETAMINE, URINE: NORMAL
MONOCYTES # BLD: 4 % (ref 1–7)
MONOCYTES # BLD: 6 % (ref 1–7)
MORPHOLOGY: ABNORMAL
MORPHOLOGY: ABNORMAL
MRSA, DNA, NASAL: NORMAL
NRBC AUTOMATED: 0.5 PER 100 WBC
NRBC AUTOMATED: 1 PER 100 WBC
NUCLEATED RED BLOOD CELLS: 1 PER 100 WBC
OPIATES, URINE: NEGATIVE
OXYCODONE SCREEN URINE: NEGATIVE
PDW BLD-RTO: 17.8 % (ref 11.8–14.4)
PDW BLD-RTO: 21.4 % (ref 11.8–14.4)
PHENCYCLIDINE, URINE: NEGATIVE
PLATELET # BLD: 270 K/UL (ref 138–453)
PLATELET # BLD: 276 K/UL (ref 138–453)
PLATELET ESTIMATE: ABNORMAL
PLATELET ESTIMATE: ABNORMAL
PMV BLD AUTO: 9.4 FL (ref 8.1–13.5)
PMV BLD AUTO: 9.5 FL (ref 8.1–13.5)
POTASSIUM SERPL-SCNC: 4.4 MMOL/L (ref 3.7–5.3)
POTASSIUM SERPL-SCNC: 4.4 MMOL/L (ref 3.7–5.3)
PROPOXYPHENE, URINE: NORMAL
RBC # BLD: 1.22 M/UL (ref 3.95–5.11)
RBC # BLD: 2.53 M/UL (ref 3.95–5.11)
RBC # BLD: ABNORMAL 10*6/UL
RBC # BLD: ABNORMAL 10*6/UL
REASON FOR REJECTION: NORMAL
SEG NEUTROPHILS: 85 % (ref 36–66)
SEG NEUTROPHILS: 86 % (ref 36–66)
SEGMENTED NEUTROPHILS ABSOLUTE COUNT: 15.72 K/UL (ref 1.8–7.7)
SEGMENTED NEUTROPHILS ABSOLUTE COUNT: 20.98 K/UL (ref 1.8–7.7)
SODIUM BLD-SCNC: 139 MMOL/L (ref 135–144)
SODIUM BLD-SCNC: 144 MMOL/L (ref 135–144)
SPECIMEN DESCRIPTION: NORMAL
TEST INFORMATION: NORMAL
TRICYCLIC ANTIDEPRESSANTS, UR: NORMAL
WBC # BLD: 18.5 K/UL (ref 3.5–11.3)
WBC # BLD: 24.4 K/UL (ref 3.5–11.3)
WBC # BLD: ABNORMAL 10*3/UL
WBC # BLD: ABNORMAL 10*3/UL
ZZ NTE CLEAN UP: ORDERED TEST: NORMAL
ZZ NTE WITH NAME CLEAN UP: SPECIMEN SOURCE: NORMAL

## 2018-11-06 PROCEDURE — 94762 N-INVAS EAR/PLS OXIMTRY CONT: CPT

## 2018-11-06 PROCEDURE — 6360000002 HC RX W HCPCS: Performed by: EMERGENCY MEDICINE

## 2018-11-06 PROCEDURE — P9016 RBC LEUKOCYTES REDUCED: HCPCS

## 2018-11-06 PROCEDURE — 2500000003 HC RX 250 WO HCPCS: Performed by: EMERGENCY MEDICINE

## 2018-11-06 PROCEDURE — 99291 CRITICAL CARE FIRST HOUR: CPT | Performed by: INTERNAL MEDICINE

## 2018-11-06 PROCEDURE — 85014 HEMATOCRIT: CPT

## 2018-11-06 PROCEDURE — 2580000003 HC RX 258: Performed by: INTERNAL MEDICINE

## 2018-11-06 PROCEDURE — C9113 INJ PANTOPRAZOLE SODIUM, VIA: HCPCS | Performed by: INTERNAL MEDICINE

## 2018-11-06 PROCEDURE — 80048 BASIC METABOLIC PNL TOTAL CA: CPT

## 2018-11-06 PROCEDURE — 51701 INSERT BLADDER CATHETER: CPT

## 2018-11-06 PROCEDURE — 86900 BLOOD TYPING SEROLOGIC ABO: CPT

## 2018-11-06 PROCEDURE — 51798 US URINE CAPACITY MEASURE: CPT

## 2018-11-06 PROCEDURE — 36430 TRANSFUSION BLD/BLD COMPNT: CPT

## 2018-11-06 PROCEDURE — 76937 US GUIDE VASCULAR ACCESS: CPT

## 2018-11-06 PROCEDURE — 2580000003 HC RX 258: Performed by: EMERGENCY MEDICINE

## 2018-11-06 PROCEDURE — 2000000000 HC ICU R&B

## 2018-11-06 PROCEDURE — 94640 AIRWAY INHALATION TREATMENT: CPT

## 2018-11-06 PROCEDURE — 6360000002 HC RX W HCPCS: Performed by: INTERNAL MEDICINE

## 2018-11-06 PROCEDURE — 6360000002 HC RX W HCPCS: Performed by: STUDENT IN AN ORGANIZED HEALTH CARE EDUCATION/TRAINING PROGRAM

## 2018-11-06 PROCEDURE — 99232 SBSQ HOSP IP/OBS MODERATE 35: CPT | Performed by: INTERNAL MEDICINE

## 2018-11-06 PROCEDURE — 2580000003 HC RX 258: Performed by: STUDENT IN AN ORGANIZED HEALTH CARE EDUCATION/TRAINING PROGRAM

## 2018-11-06 PROCEDURE — 85025 COMPLETE CBC W/AUTO DIFF WBC: CPT

## 2018-11-06 PROCEDURE — 36620 INSERTION CATHETER ARTERY: CPT

## 2018-11-06 PROCEDURE — 2700000000 HC OXYGEN THERAPY PER DAY

## 2018-11-06 PROCEDURE — 85018 HEMOGLOBIN: CPT

## 2018-11-06 PROCEDURE — 6370000000 HC RX 637 (ALT 250 FOR IP): Performed by: INTERNAL MEDICINE

## 2018-11-06 RX ORDER — MORPHINE SULFATE 2 MG/ML
2 INJECTION, SOLUTION INTRAMUSCULAR; INTRAVENOUS EVERY 6 HOURS PRN
Status: DISCONTINUED | OUTPATIENT
Start: 2018-11-06 | End: 2018-11-06

## 2018-11-06 RX ORDER — SODIUM CHLORIDE 450 MG/100ML
INJECTION, SOLUTION INTRAVENOUS CONTINUOUS
Status: DISCONTINUED | OUTPATIENT
Start: 2018-11-06 | End: 2018-11-13 | Stop reason: HOSPADM

## 2018-11-06 RX ORDER — BUPRENORPHINE HYDROCHLORIDE AND NALOXONE HYDROCHLORIDE DIHYDRATE 2; .5 MG/1; MG/1
3 TABLET SUBLINGUAL DAILY
Status: DISCONTINUED | OUTPATIENT
Start: 2018-11-06 | End: 2018-11-08

## 2018-11-06 RX ORDER — CALCIUM GLUCONATE 94 MG/ML
2 INJECTION, SOLUTION INTRAVENOUS ONCE
Status: DISCONTINUED | OUTPATIENT
Start: 2018-11-06 | End: 2018-11-06

## 2018-11-06 RX ORDER — OLANZAPINE 10 MG/1
5 INJECTION, POWDER, LYOPHILIZED, FOR SOLUTION INTRAMUSCULAR EVERY 12 HOURS PRN
Status: DISCONTINUED | OUTPATIENT
Start: 2018-11-06 | End: 2018-11-13 | Stop reason: HOSPADM

## 2018-11-06 RX ORDER — 0.9 % SODIUM CHLORIDE 0.9 %
500 INTRAVENOUS SOLUTION INTRAVENOUS ONCE
Status: COMPLETED | OUTPATIENT
Start: 2018-11-06 | End: 2018-11-06

## 2018-11-06 RX ORDER — MORPHINE SULFATE 2 MG/ML
1 INJECTION, SOLUTION INTRAMUSCULAR; INTRAVENOUS EVERY 6 HOURS PRN
Status: DISCONTINUED | OUTPATIENT
Start: 2018-11-06 | End: 2018-11-06

## 2018-11-06 RX ADMIN — HYDROCORTISONE SODIUM SUCCINATE 100 MG: 100 INJECTION, POWDER, FOR SOLUTION INTRAMUSCULAR; INTRAVENOUS at 06:02

## 2018-11-06 RX ADMIN — LORAZEPAM 3 MG: 2 INJECTION INTRAMUSCULAR; INTRAVENOUS at 03:44

## 2018-11-06 RX ADMIN — CIPROFLOXACIN 400 MG: 2 INJECTION, SOLUTION INTRAVENOUS at 00:28

## 2018-11-06 RX ADMIN — HYDROCORTISONE SODIUM SUCCINATE 100 MG: 100 INJECTION, POWDER, FOR SOLUTION INTRAMUSCULAR; INTRAVENOUS at 14:28

## 2018-11-06 RX ADMIN — Medication 10 ML: at 21:06

## 2018-11-06 RX ADMIN — SODIUM CHLORIDE 500 ML: 9 INJECTION, SOLUTION INTRAVENOUS at 02:20

## 2018-11-06 RX ADMIN — IPRATROPIUM BROMIDE AND ALBUTEROL SULFATE 3 ML: .5; 3 SOLUTION RESPIRATORY (INHALATION) at 08:08

## 2018-11-06 RX ADMIN — IPRATROPIUM BROMIDE AND ALBUTEROL SULFATE 3 ML: .5; 3 SOLUTION RESPIRATORY (INHALATION) at 11:30

## 2018-11-06 RX ADMIN — FOLIC ACID: 5 INJECTION, SOLUTION INTRAMUSCULAR; INTRAVENOUS; SUBCUTANEOUS at 14:08

## 2018-11-06 RX ADMIN — IPRATROPIUM BROMIDE AND ALBUTEROL SULFATE 3 ML: .5; 3 SOLUTION RESPIRATORY (INHALATION) at 21:00

## 2018-11-06 RX ADMIN — SODIUM CHLORIDE 8 MG/HR: 9 INJECTION, SOLUTION INTRAVENOUS at 03:40

## 2018-11-06 RX ADMIN — IPRATROPIUM BROMIDE AND ALBUTEROL SULFATE 3 ML: .5; 3 SOLUTION RESPIRATORY (INHALATION) at 15:26

## 2018-11-06 RX ADMIN — CALCIUM GLUCONATE 2 G: 98 INJECTION, SOLUTION INTRAVENOUS at 10:17

## 2018-11-06 RX ADMIN — Medication 10 ML: at 09:51

## 2018-11-06 RX ADMIN — LORAZEPAM 3 MG: 2 INJECTION INTRAMUSCULAR; INTRAVENOUS at 01:27

## 2018-11-06 RX ADMIN — HYDROCORTISONE SODIUM SUCCINATE 100 MG: 100 INJECTION, POWDER, FOR SOLUTION INTRAMUSCULAR; INTRAVENOUS at 21:07

## 2018-11-06 RX ADMIN — BUPRENORPHINE AND NALOXONE 3 TABLET: 2; .5 TABLET SUBLINGUAL at 12:56

## 2018-11-06 RX ADMIN — SODIUM CHLORIDE: 4.5 INJECTION, SOLUTION INTRAVENOUS at 10:23

## 2018-11-06 NOTE — H&P
SURGERY      LAPAROSCOPIC APPENDECTOMY N/A 3/27/2017    APPENDECTOMY LAPAROSCOPIC performed by Taylor Garcia MD at [address] [place] [address]  2002    hgsil    UPPER GASTROINTESTINAL ENDOSCOPY  2008    oesophagitis, candidiasis    UPPER GASTROINTESTINAL ENDOSCOPY  11/05/2018       Allergies: Allergies   Allergen Reactions    Aspirin Itching    Keflet [Cephalexin]     Motrin [Ibuprofen]          Home Meds:   Prior to Admission medications    Medication Sig Start Date End Date Taking? Authorizing Provider   ipratropium-albuterol (DUONEB) 0.5-2.5 (3) MG/3ML SOLN nebulizer solution Inhale 3 mLs into the lungs every 4 hours 10/25/18   Christy Velazco MD   mometasone-formoterol (DULERA) 100-5 MCG/ACT inhaler Inhale 2 puffs into the lungs 2 times daily 10/25/18   Christy Velazco MD   predniSONE (DELTASONE) 10 MG tablet Take 1 tablet by mouth daily for 3 doses 11/4/18 11/7/18  Christy Velazco MD   carvedilol (COREG) 12.5 MG tablet Take 1 tablet by mouth 2 times daily (with meals) 10/25/18   Christy Velazco MD   folic acid (FOLVITE) 1 MG tablet Take 1 tablet by mouth daily 10/13/18   Christy Velazco MD   furosemide (LASIX) 40 MG tablet Take 1 tablet by mouth daily 10/12/18   Christy Velazco MD   vitamin B-1 100 MG tablet Take 1 tablet by mouth daily 10/13/18   Christy Velazco MD   vitamin B-12 250 MCG tablet Take 1 tablet by mouth daily 10/13/18   Christy Velazco MD   docusate sodium (COLACE) 100 MG capsule Take 1 capsule by mouth 2 times daily 10/12/18   Christy Velazco MD   gabapentin (NEURONTIN) 300 MG capsule Take 300 mg by mouth 3 times daily. David Orr Historical Provider, MD   buprenorphine-naloxone (SUBOXONE) 12-3 MG sublingual film Place 1 Film under the tongue daily. David Orr     Historical Provider, MD   pantoprazole (PROTONIX) 40 MG tablet Take 1 tablet by mouth every morning (before breakfast) 9/14/18   Giovanni Mcmanus MD   guaiFENesin (MUCINEX) 600 MG extended release tablet Take 1 tablet by symmetric, no jvd skin normal  Respiratory: clear to auscultation, no wheezes or rales and unlabored breathing. No intercostal tenderness  Cardiovascular: tachycardic and normal rhythm, normal S1, S2, no murmur noted and 2+ pulses throughout  Abdomen: soft, nontender, nondistended, no masses or organomegaly, fluid wave  Extremities:  peripheral pulses normal, no pedal edema, no clubbing or cyanosis    Any additional physical findings:    VENT SETTINGS (Comprehensive) (if applicable): Additional Respiratory  Assessments  Pulse: 112  Resp: 27  SpO2: 100 %    Laboratory findings:-    CBC:   Recent Labs      11/05/18   0626   WBC  19.1*   HGB  9.5*   PLT  See Reflexed IPF Result     BMP:    Recent Labs      11/04/18   1428  11/05/18   0626   NA  133*  139   K  4.0  5.0   CL  96*  109*   CO2  25  20   BUN  50*  32*   CREATININE  1.20*  0.78   GLUCOSE  158*  128*     S. Calcium:  Recent Labs      11/05/18 0626   CALCIUM  7.9*     S. Ionized Calcium:No results for input(s): IONCA in the last 72 hours. S. Magnesium:No results for input(s): MG in the last 72 hours. S. Phosphorus:No results for input(s): PHOS in the last 72 hours. S. Glucose:No results for input(s): POCGLU in the last 72 hours. Glycosylated hemoglobin A1C: No results for input(s): LABA1C in the last 72 hours. INR:   Recent Labs      11/04/18   1438   INR  1.0     Hepatic functions:   Recent Labs      11/04/18   1428   ALKPHOS  57   ALT  15   AST  30   PROT  6.3*   BILITOT  0.49   LABALBU  3.5     Pancreatic functions:No results for input(s): LACTA, AMYLASE in the last 72 hours. S. Lactic Acid: No results for input(s): LACTA in the last 72 hours. Cardiac enzymes:  Recent Labs      11/04/18   1434  11/04/18   1631   TROPONINI  0.00  0.00     BNP:No results for input(s): BNP in the last 72 hours. Lipid profile: No results for input(s): CHOL, TRIG, HDL, LDLCALC in the last 72 hours.     Invalid input(s): LDL  Blood Gases: No results found for: PH,

## 2018-11-06 NOTE — PROGRESS NOTES
Perfect serve received. Pt going to ICU per Dr Kellen Davidson, after the EGD. Please review periop note for the procedure details.     Annita Braden MD  PGY-2, Internal Medicine  [address] [facility]

## 2018-11-06 NOTE — PROGRESS NOTES
Yes    CONSULTATION NEEDED:  [x] No  [] Yes    FAMILY UPDATED:    [] No  [x] Yes    TRANSFER OUT OF ICU:   [x] No  [] Yes      GI Bleed- Duodenal Ulcer  -Hgb 12.6 (10/25) 10.3->7.8 --> 8.6 (after 1 unit PRBC)--> 9.5 (11/5)-->EGD--> 3.8 (transfused 2 U PRBC)-->7.8 this morning  -Monitor H&H every 6 hours.   -Protonix gtt  -GI accomplished hemostasis of duodenal ulcer     Hypotension  -Improved, now [id number] systolic, continue fluids and transfuse Hgb<7  -Cortef 100 mg Q8. (patient previously on prednisone from previous admission)     Anemia-Macrocytosis. -Will transfuse as needed for acute drop.   -As above plan. -Continue Folate & B12.      Acute Kidney Injury- Likely secondary to Anemia and Dehydration. (resolved)  -Creatinine 1.20 this admission. Baseline 0.7-0.8  -Creatinine 0.78 this morning.   -UA & urine culture negative  -Hold Lisinopril and nephrotoxic agents.         History of COPD  -No acute exacerbation currently.   -Continue Albuterol, Douneb as needed. Continue Dulera. -RT aerosol per protocol.      History of Chronic CHF  -BNP 83. Trop negative x 2.   -Hold Coreg & Lasix due to hypotension.      Hepatitis C  -Hep C reactive 10/9/2017  -LFT normal   -CT abd negative 11/4/18  -US abd 10/24 showed no ascites.      HTN  -Hold Coreg, Hydrochlorothiazide, & Lisinopril due to hypotension.   -Monitor vitals.      Substance abuse  -Verify suboxone dosage with pharmacy.      Encephaolopathy  -Multifactorial, hx substance abuse, has not received Suboxone for 2 days, will obtain blood cultures, UA neg, ammonia only 53. Non focal exam, no hx of head injury, no indication for head CT or stroke workup. Cipro was initiated for possible variceal bleed, will d/c. Pt initially on CIWA protocol but family states she has been sober for a few years, will d/c ativan, use zyprexa as needed.     DVT ppx: Mechanical boots.    GI ppx: Protonix Drip       Marimar Valenzuela DO, PGY-3, Emergency Medicine  [address]

## 2018-11-06 NOTE — PROGRESS NOTES
11/05/18 0626 11/06/18   0227  11/06/18   0813   WBC  19.1*  24.4*  18.5*   HGB  9.5*  3.9*  7.8*   HCT  28.8*  12.8*  24.1*   MCV  96.6  104.9*  95.3   MCH  31.9  32.0  30.8   MCHC  33.0  30.5  32.4   PLT  See Reflexed IPF Result  270  276       BMP  Recent Labs      11/05/18   0626  11/06/18   0009  11/06/18   0813   NA  139  139  144   K  5.0  4.4  4.4   CL  109*  111*  115*   CO2  20  21  23   BUN  32*  31*  24*   CREATININE  0.78  0.92*  0.76   GLUCOSE  128*  176*  172*   CALCIUM  7.9*  7.1*  7.2*       LFTS  Recent Labs      11/04/18   1428   ALKPHOS  57   ALT  15   AST  30   PROT  6.3*   BILITOT  0.49   LABALBU  3.5       AMYLASE/LIPASE/AMMONIA  Recent Labs      11/04/18   1428  11/05/18   1344   LIPASE  79*   --    AMMONIA   --   53*       PT/INR  Recent Labs      11/04/18   1438   PROTIME  10.3   INR  1.0       ANEMIA STUDIES  Recent Labs      11/05/18   1344   [id number]  473   FOLATE  >20.0       IMAGING  Ct Abdomen Pelvis W Iv Contrast    Result Date: 11/4/2018  EXAMINATION: CT OF THE ABDOMEN AND PELVIS WITH CONTRAST 11/4/2018 3:57 pm TECHNIQUE: CT of the abdomen and pelvis was performed with the administration of intravenous contrast. Multiplanar reformatted images are provided for review. Dose modulation, iterative reconstruction, and/or weight based adjustment of the mA/kV was utilized to reduce the radiation dose to as low as reasonably achievable. COMPARISON: CT scans of the abdomen dating back to 2007. HISTORY: ORDERING SYSTEM PROVIDED HISTORY: Abd pain TECHNOLOGIST PROVIDED HISTORY: IV Only Contrast FINDINGS: Lower Chest:  Visualized portion of the lower chest demonstrates no acute abnormality. Organs: The gallbladder wall is slightly hyperenhancing. However, the gallbladder is not distended and there is no pericholecystic fluid or inflammation to suggest acute cholecystitis. There is no acute abnormality of the liver, pancreas, spleen, adrenals, or kidneys. GI/Bowel:  Bowel caliber is normal.

## 2018-11-06 NOTE — CARE COORDINATION
Pt with confusion, agitation. Suboxone started today. Will see how pt progresses. Plan home with Foy Seed . Await PT/OT evals when appropriate.

## 2018-11-07 ENCOUNTER — APPOINTMENT (OUTPATIENT)
Dept: CT IMAGING | Age: 63
DRG: 241 | End: 2018-11-07
Payer: MEDICARE

## 2018-11-07 LAB
ABSOLUTE EOS #: 0 K/UL (ref 0–0.4)
ABSOLUTE IMMATURE GRANULOCYTE: 0 K/UL (ref 0–0.3)
ABSOLUTE LYMPH #: 3.16 K/UL (ref 1–4.8)
ABSOLUTE MONO #: 1.22 K/UL (ref 0.1–0.8)
AMMONIA: 36 UMOL/L (ref 11–51)
ANION GAP SERPL CALCULATED.3IONS-SCNC: 9 MMOL/L (ref 9–17)
BASOPHILS # BLD: 0 % (ref 0–2)
BASOPHILS ABSOLUTE: 0 K/UL (ref 0–0.2)
BUN BLDV-MCNC: 13 MG/DL (ref 8–23)
BUN/CREAT BLD: ABNORMAL (ref 9–20)
CALCIUM SERPL-MCNC: 8.1 MG/DL (ref 8.6–10.4)
CHLORIDE BLD-SCNC: 108 MMOL/L (ref 98–107)
CO2: 23 MMOL/L (ref 20–31)
CREAT SERPL-MCNC: 0.69 MG/DL (ref 0.5–0.9)
DIFFERENTIAL TYPE: ABNORMAL
EKG ATRIAL RATE: 94 BPM
EKG P AXIS: 45 DEGREES
EKG P-R INTERVAL: 138 MS
EKG Q-T INTERVAL: 354 MS
EKG QRS DURATION: 76 MS
EKG QTC CALCULATION (BAZETT): 442 MS
EKG R AXIS: 64 DEGREES
EKG T AXIS: 75 DEGREES
EKG VENTRICULAR RATE: 94 BPM
EOSINOPHILS RELATIVE PERCENT: 0 % (ref 1–4)
GFR AFRICAN AMERICAN: >60 ML/MIN
GFR NON-AFRICAN AMERICAN: >60 ML/MIN
GFR SERPL CREATININE-BSD FRML MDRD: ABNORMAL ML/MIN/{1.73_M2}
GFR SERPL CREATININE-BSD FRML MDRD: ABNORMAL ML/MIN/{1.73_M2}
GLUCOSE BLD-MCNC: 138 MG/DL (ref 70–99)
HCT VFR BLD CALC: 20.2 % (ref 36.3–47.1)
HCT VFR BLD CALC: 22.6 % (ref 36.3–47.1)
HCT VFR BLD CALC: 22.9 % (ref 36.3–47.1)
HCT VFR BLD CALC: 23.3 % (ref 36.3–47.1)
HEMOGLOBIN: 6.8 G/DL (ref 11.9–15.1)
HEMOGLOBIN: 7.2 G/DL (ref 11.9–15.1)
HEMOGLOBIN: 7.6 G/DL (ref 11.9–15.1)
HEMOGLOBIN: 8.1 G/DL (ref 11.9–15.1)
IMMATURE GRANULOCYTES: 0 %
LYMPHOCYTES # BLD: 13 % (ref 24–44)
MCH RBC QN AUTO: 30.8 PG (ref 25.2–33.5)
MCHC RBC AUTO-ENTMCNC: 31.9 G/DL (ref 28.4–34.8)
MCV RBC AUTO: 96.6 FL (ref 82.6–102.9)
MONOCYTES # BLD: 5 % (ref 1–7)
MORPHOLOGY: ABNORMAL
NRBC AUTOMATED: 2.1 PER 100 WBC
NUCLEATED RED BLOOD CELLS: 1 PER 100 WBC
PDW BLD-RTO: 20.5 % (ref 11.8–14.4)
PLATELET # BLD: 279 K/UL (ref 138–453)
PLATELET ESTIMATE: ABNORMAL
PMV BLD AUTO: 9.5 FL (ref 8.1–13.5)
POTASSIUM SERPL-SCNC: 3.8 MMOL/L (ref 3.7–5.3)
RBC # BLD: 2.34 M/UL (ref 3.95–5.11)
RBC # BLD: ABNORMAL 10*6/UL
SEG NEUTROPHILS: 82 % (ref 36–66)
SEGMENTED NEUTROPHILS ABSOLUTE COUNT: 19.92 K/UL (ref 1.8–7.7)
SODIUM BLD-SCNC: 140 MMOL/L (ref 135–144)
WBC # BLD: 24.3 K/UL (ref 3.5–11.3)
WBC # BLD: ABNORMAL 10*3/UL

## 2018-11-07 PROCEDURE — 2700000000 HC OXYGEN THERAPY PER DAY

## 2018-11-07 PROCEDURE — 6370000000 HC RX 637 (ALT 250 FOR IP)

## 2018-11-07 PROCEDURE — 86900 BLOOD TYPING SEROLOGIC ABO: CPT

## 2018-11-07 PROCEDURE — 2500000003 HC RX 250 WO HCPCS: Performed by: EMERGENCY MEDICINE

## 2018-11-07 PROCEDURE — 99232 SBSQ HOSP IP/OBS MODERATE 35: CPT | Performed by: INTERNAL MEDICINE

## 2018-11-07 PROCEDURE — 6370000000 HC RX 637 (ALT 250 FOR IP): Performed by: INTERNAL MEDICINE

## 2018-11-07 PROCEDURE — 2580000003 HC RX 258: Performed by: STUDENT IN AN ORGANIZED HEALTH CARE EDUCATION/TRAINING PROGRAM

## 2018-11-07 PROCEDURE — 36430 TRANSFUSION BLD/BLD COMPNT: CPT

## 2018-11-07 PROCEDURE — 82140 ASSAY OF AMMONIA: CPT

## 2018-11-07 PROCEDURE — 94640 AIRWAY INHALATION TREATMENT: CPT

## 2018-11-07 PROCEDURE — 2500000003 HC RX 250 WO HCPCS: Performed by: STUDENT IN AN ORGANIZED HEALTH CARE EDUCATION/TRAINING PROGRAM

## 2018-11-07 PROCEDURE — 36415 COLL VENOUS BLD VENIPUNCTURE: CPT

## 2018-11-07 PROCEDURE — 6360000002 HC RX W HCPCS: Performed by: EMERGENCY MEDICINE

## 2018-11-07 PROCEDURE — 2000000000 HC ICU R&B

## 2018-11-07 PROCEDURE — 70450 CT HEAD/BRAIN W/O DYE: CPT

## 2018-11-07 PROCEDURE — 85018 HEMOGLOBIN: CPT

## 2018-11-07 PROCEDURE — 6360000002 HC RX W HCPCS: Performed by: INTERNAL MEDICINE

## 2018-11-07 PROCEDURE — 94762 N-INVAS EAR/PLS OXIMTRY CONT: CPT

## 2018-11-07 PROCEDURE — 6360000002 HC RX W HCPCS: Performed by: STUDENT IN AN ORGANIZED HEALTH CARE EDUCATION/TRAINING PROGRAM

## 2018-11-07 PROCEDURE — 85025 COMPLETE CBC W/AUTO DIFF WBC: CPT

## 2018-11-07 PROCEDURE — 2580000003 HC RX 258: Performed by: EMERGENCY MEDICINE

## 2018-11-07 PROCEDURE — 85014 HEMATOCRIT: CPT

## 2018-11-07 PROCEDURE — 80048 BASIC METABOLIC PNL TOTAL CA: CPT

## 2018-11-07 PROCEDURE — C9113 INJ PANTOPRAZOLE SODIUM, VIA: HCPCS | Performed by: INTERNAL MEDICINE

## 2018-11-07 PROCEDURE — 2580000003 HC RX 258: Performed by: INTERNAL MEDICINE

## 2018-11-07 PROCEDURE — 99291 CRITICAL CARE FIRST HOUR: CPT | Performed by: INTERNAL MEDICINE

## 2018-11-07 PROCEDURE — P9016 RBC LEUKOCYTES REDUCED: HCPCS

## 2018-11-07 RX ORDER — HYDRALAZINE HYDROCHLORIDE 20 MG/ML
5 INJECTION INTRAMUSCULAR; INTRAVENOUS EVERY 6 HOURS PRN
Status: DISCONTINUED | OUTPATIENT
Start: 2018-11-07 | End: 2018-11-08

## 2018-11-07 RX ORDER — ACETAMINOPHEN 650 MG/1
SUPPOSITORY RECTAL
Status: COMPLETED
Start: 2018-11-07 | End: 2018-11-07

## 2018-11-07 RX ORDER — ACETAMINOPHEN 650 MG/1
650 SUPPOSITORY RECTAL ONCE
Status: DISCONTINUED | OUTPATIENT
Start: 2018-11-07 | End: 2018-11-12

## 2018-11-07 RX ORDER — FENTANYL CITRATE 50 UG/ML
25 INJECTION, SOLUTION INTRAMUSCULAR; INTRAVENOUS ONCE
Status: DISCONTINUED | OUTPATIENT
Start: 2018-11-07 | End: 2018-11-07 | Stop reason: ALTCHOICE

## 2018-11-07 RX ORDER — METOPROLOL TARTRATE 5 MG/5ML
5 INJECTION INTRAVENOUS EVERY 6 HOURS PRN
Status: DISCONTINUED | OUTPATIENT
Start: 2018-11-07 | End: 2018-11-08

## 2018-11-07 RX ORDER — 0.9 % SODIUM CHLORIDE 0.9 %
250 INTRAVENOUS SOLUTION INTRAVENOUS ONCE
Status: COMPLETED | OUTPATIENT
Start: 2018-11-07 | End: 2018-11-08

## 2018-11-07 RX ORDER — GABAPENTIN 100 MG/1
100 CAPSULE ORAL 3 TIMES DAILY
Status: DISCONTINUED | OUTPATIENT
Start: 2018-11-07 | End: 2018-11-10

## 2018-11-07 RX ADMIN — METOPROLOL TARTRATE 5 MG: 5 INJECTION, SOLUTION INTRAVENOUS at 17:46

## 2018-11-07 RX ADMIN — FOLIC ACID: 5 INJECTION, SOLUTION INTRAMUSCULAR; INTRAVENOUS; SUBCUTANEOUS at 08:45

## 2018-11-07 RX ADMIN — MOMETASONE FUROATE AND FORMOTEROL FUMARATE DIHYDRATE 2 PUFF: 100; 5 AEROSOL RESPIRATORY (INHALATION) at 08:04

## 2018-11-07 RX ADMIN — SODIUM CHLORIDE 8 MG/HR: 9 INJECTION, SOLUTION INTRAVENOUS at 23:13

## 2018-11-07 RX ADMIN — WATER 2.1 ML: 1 INJECTION INTRAMUSCULAR; INTRAVENOUS; SUBCUTANEOUS at 03:36

## 2018-11-07 RX ADMIN — METOPROLOL TARTRATE 5 MG: 5 INJECTION, SOLUTION INTRAVENOUS at 11:08

## 2018-11-07 RX ADMIN — IPRATROPIUM BROMIDE AND ALBUTEROL SULFATE 3 ML: .5; 3 SOLUTION RESPIRATORY (INHALATION) at 16:30

## 2018-11-07 RX ADMIN — ACETAMINOPHEN 650 MG: 650 SUPPOSITORY RECTAL at 17:47

## 2018-11-07 RX ADMIN — SODIUM CHLORIDE: 4.5 INJECTION, SOLUTION INTRAVENOUS at 18:19

## 2018-11-07 RX ADMIN — IPRATROPIUM BROMIDE AND ALBUTEROL SULFATE 3 ML: .5; 3 SOLUTION RESPIRATORY (INHALATION) at 08:04

## 2018-11-07 RX ADMIN — SODIUM CHLORIDE 8 MG/HR: 9 INJECTION, SOLUTION INTRAVENOUS at 11:05

## 2018-11-07 RX ADMIN — OLANZAPINE 5 MG: 10 INJECTION, POWDER, FOR SOLUTION INTRAMUSCULAR at 03:34

## 2018-11-07 RX ADMIN — SODIUM CHLORIDE 8 MG/HR: 9 INJECTION, SOLUTION INTRAVENOUS at 01:47

## 2018-11-07 RX ADMIN — HYDROCORTISONE SODIUM SUCCINATE 100 MG: 100 INJECTION, POWDER, FOR SOLUTION INTRAMUSCULAR; INTRAVENOUS at 05:28

## 2018-11-07 RX ADMIN — Medication 10 ML: at 20:14

## 2018-11-07 RX ADMIN — Medication 10 ML: at 08:47

## 2018-11-07 RX ADMIN — HYDRALAZINE HYDROCHLORIDE 5 MG: 20 INJECTION INTRAMUSCULAR; INTRAVENOUS at 05:03

## 2018-11-07 RX ADMIN — SODIUM CHLORIDE: 4.5 INJECTION, SOLUTION INTRAVENOUS at 01:46

## 2018-11-07 RX ADMIN — IPRATROPIUM BROMIDE AND ALBUTEROL SULFATE 3 ML: .5; 3 SOLUTION RESPIRATORY (INHALATION) at 11:25

## 2018-11-07 RX ADMIN — SODIUM CHLORIDE 100 ML: 9 INJECTION, SOLUTION INTRAVENOUS at 15:50

## 2018-11-07 RX ADMIN — IPRATROPIUM BROMIDE AND ALBUTEROL SULFATE 3 ML: .5; 3 SOLUTION RESPIRATORY (INHALATION) at 19:26

## 2018-11-07 RX ADMIN — BUPRENORPHINE AND NALOXONE 3 TABLET: 2; .5 TABLET SUBLINGUAL at 10:16

## 2018-11-07 RX ADMIN — HYDROCORTISONE SODIUM SUCCINATE 100 MG: 100 INJECTION, POWDER, FOR SOLUTION INTRAMUSCULAR; INTRAVENOUS at 13:07

## 2018-11-07 RX ADMIN — OLANZAPINE 5 MG: 10 INJECTION, POWDER, FOR SOLUTION INTRAMUSCULAR at 21:23

## 2018-11-07 NOTE — PROGRESS NOTES
 guaiFENesin  600 mg Oral BID    mometasone-formoterol  2 puff Inhalation BID    therapeutic multivitamin-minerals  1 tablet Oral Daily    cyanocobalamin  250 mcg Oral Daily    sodium chloride flush  10 mL Intravenous 2 times per day    hydrocortisone sodium succinate PF  100 mg Intravenous Q8H    ipratropium-albuterol  3 mL Inhalation 4x daily     Continuous Infusions:   sodium chloride 75 mL/hr at 11/07/18 0146    pantoprozole (PROTONIX) infusion 8 mg/hr (11/07/18 0147)     PRN Meds:     hydrALAZINE 5 mg Q6H PRN   metoprolol 5 mg Q6H PRN   OLANZapine 5 mg Q12H PRN   And     sterile water 2.1 mL Q12H PRN   sodium chloride flush 10 mL PRN   magnesium hydroxide 30 mL Daily PRN   ondansetron 4 mg Q6H PRN   potassium chloride 40 mEq PRN   Or     potassium chloride 40 mEq PRN   Or     potassium chloride 10 mEq PRN   nicotine 1 patch PRN   albuterol 2.5 mg Q6H PRN     NC 2lpm    DATA:  Complete Blood Count: Recent Labs      11/06/18   0227  11/06/18 0813 11/06/18   1840 11/07/18   0006  11/07/18   0638   WBC  24.4*  18.5*   --    --    --   24.3*   RBC  1.22*  2.53*   --    --    --   2.34*   HGB  3.9*  7.8*   < >  7.6*  7.6*  7.2*   HCT  12.8*  24.1*   < >  23.3*  22.9*  22.6*   MCV  104.9*  95.3   --    --    --   96.6   MCH  32.0  30.8   --    --    --   30.8   MCHC  30.5  32.4   --    --    --   31.9   RDW  21.4*  17.8*   --    --    --   20.5*   PLT  270  276   --    --    --   279   MPV  9.4  9.5   --    --    --   9.5    < > = values in this interval not displayed.         Last 3 Blood Glucose:   Recent Labs      11/04/18   1428  11/05/18   0626  11/06/18   0009  11/06/18   0813 11/07/18   0638   GLUCOSE  158*  128*  176*  172*  138*        PT/INR:    Lab Results   Component Value Date    PROTIME 10.3 11/04/2018    INR 1.0 11/04/2018     PTT:    Lab Results   Component Value Date    APTT 19.3 11/04/2018       Comprehensive Metabolic Profile:   Recent Labs      11/04/18   1428   11/06/18   0009 negative 11/4/18  -US abd 10/24 showed no ascites.      HTN  -Restart HTN meds when taking PO.   -Monitor vitals.      Substance abuse  -Restarted on Suboxone yesterday      Encephaolopathy  -Multifactorial, acute illness, not sleeping well, hx substance abuse, did not receive Suboxone for 2 days, now restarted, Cx w/ NGTD, ammonia only 53. Will CT head to r/o acute abnormality, recheck ammonia. Cipro was initiated for possible variceal bleed, will d/c. Pt initially on CIWA protocol but family states she has been sober for a few years, will d/c ativan, use zyprexa as needed, which helped last night.     DVT ppx: Mechanical boots. GI ppx: Protonix Drzully Abdalla DO, PGY-3, Emergency Medicine  [facility]  11/7/2018 9:31 AM  Attending Physician Statement  I have discussed the care of Emi Daniels, including pertinent history and exam findings,  with the resident. I have seen and examined the patient and the key elements of all parts of the encounter have been performed by me. I agree with the assessment, plan and orders as documented by the resident with additions . Improved mentation   Transfuse I unit prbc   Oral antihypertensives once ok for po        Total critical care time caring for this patient with life threatening, unstable organ failure, including direct patient contact, management of life support systems, review of data including imaging and labs, discussions with other team members and physicians at least 27   Min so far today, excluding procedures. Son and ex  updated     Treatment plan Discussed with nursing staff in detail , all questions answered . Electronically signed by Laura Winkler MD on   11/7/18 at 9:43 PM    Please note that this chart was generated using voice recognition Dragon dictation software.   Although every effort was made to ensure the accuracy of this automated transcription, some errors in transcription may have occurred.

## 2018-11-08 LAB
-: NORMAL
ABO/RH: NORMAL
ABSOLUTE EOS #: 0.21 K/UL (ref 0–0.4)
ABSOLUTE IMMATURE GRANULOCYTE: 0.42 K/UL (ref 0–0.3)
ABSOLUTE LYMPH #: 2.09 K/UL (ref 1–4.8)
ABSOLUTE MONO #: 1.05 K/UL (ref 0.1–0.8)
ANION GAP SERPL CALCULATED.3IONS-SCNC: 7 MMOL/L (ref 9–17)
ANTIBODY SCREEN: NEGATIVE
ARM BAND NUMBER: NORMAL
BASOPHILS # BLD: 0 % (ref 0–2)
BASOPHILS ABSOLUTE: 0 K/UL (ref 0–0.2)
BLD PROD TYP BPU: NORMAL
BUN BLDV-MCNC: 11 MG/DL (ref 8–23)
BUN/CREAT BLD: ABNORMAL (ref 9–20)
CALCIUM SERPL-MCNC: 7.6 MG/DL (ref 8.6–10.4)
CHLORIDE BLD-SCNC: 108 MMOL/L (ref 98–107)
CO2: 23 MMOL/L (ref 20–31)
CREAT SERPL-MCNC: 0.68 MG/DL (ref 0.5–0.9)
CROSSMATCH RESULT: NORMAL
DIFFERENTIAL TYPE: ABNORMAL
DISPENSE STATUS BLOOD BANK: NORMAL
EOSINOPHILS RELATIVE PERCENT: 1 % (ref 1–4)
EXPIRATION DATE: NORMAL
GFR AFRICAN AMERICAN: >60 ML/MIN
GFR NON-AFRICAN AMERICAN: >60 ML/MIN
GFR SERPL CREATININE-BSD FRML MDRD: ABNORMAL ML/MIN/{1.73_M2}
GFR SERPL CREATININE-BSD FRML MDRD: ABNORMAL ML/MIN/{1.73_M2}
GLUCOSE BLD-MCNC: 88 MG/DL (ref 70–99)
HCT VFR BLD CALC: 21.1 % (ref 36.3–47.1)
HCT VFR BLD CALC: 24.3 % (ref 36.3–47.1)
HCT VFR BLD CALC: 26.1 % (ref 36.3–47.1)
HEMOGLOBIN: 7.5 G/DL (ref 11.9–15.1)
HEMOGLOBIN: 7.7 G/DL (ref 11.9–15.1)
HEMOGLOBIN: 8.2 G/DL (ref 11.9–15.1)
IMMATURE GRANULOCYTES: 2 %
LYMPHOCYTES # BLD: 10 % (ref 24–44)
MCH RBC QN AUTO: 31.1 PG (ref 25.2–33.5)
MCHC RBC AUTO-ENTMCNC: 35.5 G/DL (ref 28.4–34.8)
MCV RBC AUTO: 87.6 FL (ref 82.6–102.9)
MONOCYTES # BLD: 5 % (ref 1–7)
MORPHOLOGY: ABNORMAL
NRBC AUTOMATED: 4 PER 100 WBC
NUCLEATED RED BLOOD CELLS: 3 PER 100 WBC
PDW BLD-RTO: 19.9 % (ref 11.8–14.4)
PLATELET # BLD: 302 K/UL (ref 138–453)
PLATELET # BLD: ABNORMAL K/UL (ref 138–453)
PLATELET ESTIMATE: ABNORMAL
PLATELET, FLUORESCENCE: NORMAL K/UL (ref 138–453)
PLATELET, IMMATURE FRACTION: NORMAL % (ref 1.1–10.3)
PMV BLD AUTO: ABNORMAL FL (ref 8.1–13.5)
POTASSIUM SERPL-SCNC: 3.6 MMOL/L (ref 3.7–5.3)
RBC # BLD: 2.41 M/UL (ref 3.95–5.11)
RBC # BLD: ABNORMAL 10*6/UL
REASON FOR REJECTION: NORMAL
SEG NEUTROPHILS: 82 % (ref 36–66)
SEGMENTED NEUTROPHILS ABSOLUTE COUNT: 17.13 K/UL (ref 1.8–7.7)
SODIUM BLD-SCNC: 138 MMOL/L (ref 135–144)
TRANSFUSION STATUS: NORMAL
UNIT DIVISION: 0
UNIT NUMBER: NORMAL
WBC # BLD: 20.9 K/UL (ref 3.5–11.3)
WBC # BLD: ABNORMAL 10*3/UL
ZZ NTE CLEAN UP: ORDERED TEST: NORMAL
ZZ NTE WITH NAME CLEAN UP: SPECIMEN SOURCE: NORMAL

## 2018-11-08 PROCEDURE — 2580000003 HC RX 258: Performed by: EMERGENCY MEDICINE

## 2018-11-08 PROCEDURE — 6370000000 HC RX 637 (ALT 250 FOR IP): Performed by: EMERGENCY MEDICINE

## 2018-11-08 PROCEDURE — 6370000000 HC RX 637 (ALT 250 FOR IP): Performed by: INTERNAL MEDICINE

## 2018-11-08 PROCEDURE — 97535 SELF CARE MNGMENT TRAINING: CPT | Performed by: OCCUPATIONAL THERAPIST

## 2018-11-08 PROCEDURE — 6360000002 HC RX W HCPCS: Performed by: INTERNAL MEDICINE

## 2018-11-08 PROCEDURE — 85049 AUTOMATED PLATELET COUNT: CPT

## 2018-11-08 PROCEDURE — 6360000002 HC RX W HCPCS: Performed by: EMERGENCY MEDICINE

## 2018-11-08 PROCEDURE — 94640 AIRWAY INHALATION TREATMENT: CPT

## 2018-11-08 PROCEDURE — 6360000002 HC RX W HCPCS: Performed by: STUDENT IN AN ORGANIZED HEALTH CARE EDUCATION/TRAINING PROGRAM

## 2018-11-08 PROCEDURE — 85025 COMPLETE CBC W/AUTO DIFF WBC: CPT

## 2018-11-08 PROCEDURE — 36416 COLLJ CAPILLARY BLOOD SPEC: CPT

## 2018-11-08 PROCEDURE — 36415 COLL VENOUS BLD VENIPUNCTURE: CPT

## 2018-11-08 PROCEDURE — 85055 RETICULATED PLATELET ASSAY: CPT

## 2018-11-08 PROCEDURE — 2000000000 HC ICU R&B

## 2018-11-08 PROCEDURE — 99232 SBSQ HOSP IP/OBS MODERATE 35: CPT | Performed by: INTERNAL MEDICINE

## 2018-11-08 PROCEDURE — 2580000003 HC RX 258: Performed by: STUDENT IN AN ORGANIZED HEALTH CARE EDUCATION/TRAINING PROGRAM

## 2018-11-08 PROCEDURE — 2700000000 HC OXYGEN THERAPY PER DAY

## 2018-11-08 PROCEDURE — 94762 N-INVAS EAR/PLS OXIMTRY CONT: CPT

## 2018-11-08 PROCEDURE — 2500000003 HC RX 250 WO HCPCS: Performed by: EMERGENCY MEDICINE

## 2018-11-08 PROCEDURE — 99291 CRITICAL CARE FIRST HOUR: CPT | Performed by: INTERNAL MEDICINE

## 2018-11-08 PROCEDURE — 85014 HEMATOCRIT: CPT

## 2018-11-08 PROCEDURE — 97110 THERAPEUTIC EXERCISES: CPT

## 2018-11-08 PROCEDURE — 85018 HEMOGLOBIN: CPT

## 2018-11-08 PROCEDURE — C9113 INJ PANTOPRAZOLE SODIUM, VIA: HCPCS | Performed by: INTERNAL MEDICINE

## 2018-11-08 PROCEDURE — 2580000003 HC RX 258: Performed by: INTERNAL MEDICINE

## 2018-11-08 PROCEDURE — 80048 BASIC METABOLIC PNL TOTAL CA: CPT

## 2018-11-08 RX ORDER — LISINOPRIL 5 MG/1
5 TABLET ORAL ONCE
Status: COMPLETED | OUTPATIENT
Start: 2018-11-08 | End: 2018-11-08

## 2018-11-08 RX ORDER — BUPRENORPHINE HYDROCHLORIDE AND NALOXONE HYDROCHLORIDE DIHYDRATE 2; .5 MG/1; MG/1
6 TABLET SUBLINGUAL DAILY
Status: DISCONTINUED | OUTPATIENT
Start: 2018-11-09 | End: 2018-11-13 | Stop reason: HOSPADM

## 2018-11-08 RX ORDER — CARVEDILOL 12.5 MG/1
12.5 TABLET ORAL 2 TIMES DAILY WITH MEALS
Status: DISCONTINUED | OUTPATIENT
Start: 2018-11-08 | End: 2018-11-09

## 2018-11-08 RX ORDER — CALCIUM GLUCONATE 94 MG/ML
1 INJECTION, SOLUTION INTRAVENOUS ONCE
Status: DISCONTINUED | OUTPATIENT
Start: 2018-11-08 | End: 2018-11-08

## 2018-11-08 RX ORDER — FENTANYL CITRATE 50 UG/ML
25 INJECTION, SOLUTION INTRAMUSCULAR; INTRAVENOUS ONCE
Status: COMPLETED | OUTPATIENT
Start: 2018-11-08 | End: 2018-11-08

## 2018-11-08 RX ORDER — METOPROLOL TARTRATE 5 MG/5ML
5 INJECTION INTRAVENOUS EVERY 6 HOURS PRN
Status: DISCONTINUED | OUTPATIENT
Start: 2018-11-08 | End: 2018-11-13 | Stop reason: HOSPADM

## 2018-11-08 RX ADMIN — GABAPENTIN 100 MG: 100 CAPSULE ORAL at 14:00

## 2018-11-08 RX ADMIN — IPRATROPIUM BROMIDE AND ALBUTEROL SULFATE 3 ML: .5; 3 SOLUTION RESPIRATORY (INHALATION) at 11:29

## 2018-11-08 RX ADMIN — SODIUM CHLORIDE 8 MG/HR: 9 INJECTION, SOLUTION INTRAVENOUS at 21:30

## 2018-11-08 RX ADMIN — Medication 10 ML: at 08:52

## 2018-11-08 RX ADMIN — CARVEDILOL 12.5 MG: 12.5 TABLET, FILM COATED ORAL at 16:17

## 2018-11-08 RX ADMIN — SODIUM CHLORIDE: 4.5 INJECTION, SOLUTION INTRAVENOUS at 07:26

## 2018-11-08 RX ADMIN — METOPROLOL TARTRATE 5 MG: 5 INJECTION, SOLUTION INTRAVENOUS at 11:48

## 2018-11-08 RX ADMIN — IPRATROPIUM BROMIDE AND ALBUTEROL SULFATE 3 ML: .5; 3 SOLUTION RESPIRATORY (INHALATION) at 15:19

## 2018-11-08 RX ADMIN — SODIUM CHLORIDE 8 MG/HR: 9 INJECTION, SOLUTION INTRAVENOUS at 08:51

## 2018-11-08 RX ADMIN — IPRATROPIUM BROMIDE AND ALBUTEROL SULFATE 3 ML: .5; 3 SOLUTION RESPIRATORY (INHALATION) at 20:20

## 2018-11-08 RX ADMIN — GABAPENTIN 100 MG: 100 CAPSULE ORAL at 20:36

## 2018-11-08 RX ADMIN — Medication 10 ML: at 20:37

## 2018-11-08 RX ADMIN — IPRATROPIUM BROMIDE AND ALBUTEROL SULFATE 3 ML: .5; 3 SOLUTION RESPIRATORY (INHALATION) at 07:51

## 2018-11-08 RX ADMIN — HYDRALAZINE HYDROCHLORIDE 5 MG: 20 INJECTION INTRAMUSCULAR; INTRAVENOUS at 03:17

## 2018-11-08 RX ADMIN — FOLIC ACID: 5 INJECTION, SOLUTION INTRAMUSCULAR; INTRAVENOUS; SUBCUTANEOUS at 08:51

## 2018-11-08 RX ADMIN — FENTANYL CITRATE 25 MCG: 50 INJECTION, SOLUTION INTRAMUSCULAR; INTRAVENOUS at 03:31

## 2018-11-08 RX ADMIN — CALCIUM GLUCONATE 1 G: 98 INJECTION, SOLUTION INTRAVENOUS at 08:51

## 2018-11-08 RX ADMIN — Medication 10 ML: at 20:36

## 2018-11-08 RX ADMIN — LISINOPRIL 5 MG: 5 TABLET ORAL at 16:17

## 2018-11-08 RX ADMIN — BUPRENORPHINE AND NALOXONE 3 TABLET: 2; .5 TABLET SUBLINGUAL at 09:22

## 2018-11-08 RX ADMIN — GABAPENTIN 100 MG: 100 CAPSULE ORAL at 08:51

## 2018-11-08 ASSESSMENT — PAIN SCALES - GENERAL
PAINLEVEL_OUTOF10: 6
PAINLEVEL_OUTOF10: 6
PAINLEVEL_OUTOF10: 0

## 2018-11-08 NOTE — PROGRESS NOTES
Neuromuscular Re-education, Endurance Training, Gait Training, Home Exercise Program, Safety Education & Training, Patient/Caregiver Education & Training  Safety Devices  Type of devices: Call light within reach, Chair alarm in place, Gait belt, Left in chair, Nurse notified  Restraints  Initially in place: No (B soft wrist restraints on but not secured (at start and end of treatment))     Therapy Time   Individual Concurrent Group Co-treatment   Time In 1130         Time Out 1155         Minutes [address] [place] [address]

## 2018-11-08 NOTE — PROGRESS NOTES
3 tablet Sublingual Daily    sodium chloride flush  10 mL Intravenous 2 times per day    sodium chloride flush  10 mL Intravenous 2 times per day    ipratropium-albuterol  3 mL Inhalation 4x daily     Continuous Infusions:   sodium chloride 75 mL/hr at 11/08/18 0726    pantoprozole (PROTONIX) infusion 8 mg/hr (11/07/18 2313)     PRN Meds:     hydrALAZINE 5 mg Q6H PRN   metoprolol 5 mg Q6H PRN   OLANZapine 5 mg Q12H PRN   And     sterile water 2.1 mL Q12H PRN   sodium chloride flush 10 mL PRN   magnesium hydroxide 30 mL Daily PRN   ondansetron 4 mg Q6H PRN   potassium chloride 40 mEq PRN   Or     potassium chloride 40 mEq PRN   Or     potassium chloride 10 mEq PRN   nicotine 1 patch PRN   albuterol 2.5 mg Q6H PRN         DATA:  Complete Blood Count: Recent Labs      11/06/18   0813 11/07/18   0638  11/07/18   1414  11/07/18 2232  11/08/18   0511   WBC  18.5*   --   24.3*   --    --   20.9*   RBC  2.53*   --   2.34*   --    --   2.41*   HGB  7.8*   < >  7.2*  6.8*  8.1*  7.5*   HCT  24.1*   < >  22.6*  20.2*  23.3*  21.1*   MCV  95.3   --   96.6   --    --   87.6   MCH  30.8   --   30.8   --    --   31.1   MCHC  32.4   --   31.9   --    --   35.5*   RDW  17.8*   --   20.5*   --    --   19.9*   PLT  276   --   279   --    --   See Reflexed IPF Result   MPV  9.5   --   9.5   --    --   NOT REPORTED    < > = values in this interval not displayed.         Last 3 Blood Glucose:   Recent Labs      11/06/18   0009  11/06/18 0813 11/07/18 0638  11/08/18   0511   GLUCOSE  176*  172*  138*  88        PT/INR:    Lab Results   Component Value Date    PROTIME 10.3 11/04/2018    INR 1.0 11/04/2018     PTT:    Lab Results   Component Value Date    APTT 19.3 11/04/2018       Comprehensive Metabolic Profile:   Recent Labs      11/06/18   0813  11/07/18   0638  11/08/18   0511   NA  144  140  138   K  4.4  3.8  3.6*   CL  115*  108*  108*   CO2  23  23  23   BUN  24*  13  11   CREATININE  0.76  0.69  0.68   GLUCOSE  172*

## 2018-11-08 NOTE — PROGRESS NOTES
2. 23 2.42 2.61 2.81 3.02 3.23 66 - 69 2.04 2.26 2.48 2.71 2.95 3.19 3.44 3.70   70+ 1.82 1.99 2.17 2.36 2.55 2.75 2.95 3.16 70+ 1.97 2.19 2.41 2.64 2.87 3.12 3.37 3.62             Predicted Peak Expiratory Flow Rate                                       Height (in)  Female       Height (in) Male           Age 64 63 56 57 57 79 76 71 Age            19 344 357 372 387 402 417 432 446  60 62 64 66 68 70 72 74 76   25 337 352 366 381 396 411 426 441 25 447 476 [phone] [phone]   30 329 344 359 374 389 404 419 434 30 437 466 494 523 [phone] 667   35 322 337 351 366 381 396 411 426 35 426 455 484 512 541 [phone]   40 314 329 344 359 374 389 404 419 40 416 445 473 502 531 559 588 617 647   45 307 322 336 351 366 381 396 411 45 405 434 463 491 520 549 577 606 636   50 299 314 329 344 359 374 389 404 50 395 424 452 481 510 538 567 596 625   55 292 307 321 336 351 366 381 396 55 384 413 442 470 499 528 556 585 615   60 284 299 314 329 344 359 374 389 60 374 403 431 460 489 517 546 575 605   65 277 292 306 321 336 351 366 381 65 363 392 421 449 478 507 535 564 594   70 269 284 299 314 329 344 359 374 70 353 382 410 439 [phone] 554 583   75 261 274 289 305 319 334 348 364 75 344 372 400 429 458 487 515 544 573   80 253 266 282 296 312 327 342 356 80 335 362 390 419 448 476 [phone]

## 2018-11-08 NOTE — PROGRESS NOTES
Occupational Therapy  [address]  Daily Treatment Note  NAME: Bhavik Daniels  : 1955  MRN: [id number]    Date of Service: 2018    Discharge Recommendations:  [address]     Patient Diagnosis(es): The encounter diagnosis was Upper GI bleed. has a past medical history of Appendicitis; CHF (congestive heart failure) ([facility] Chronic rhinitis; Cigarette smoker; COPD (chronic obstructive pulmonary disease) ([address] Depression; Dysphagia; Essential hypertension; GERD (gastroesophageal reflux disease); Heart failure, diastolic, with acute decompensation ([address] Hepatitis C, chronic ([facility] [address] [facility] Hyperlipidemia; Hypertension; Migraine; Moderate COPD (chronic obstructive pulmonary disease) ([address] Multiple transfusions; Osteoarthritis; Pneumonia; Scoliosis; and Substance abuse ([facility] [address] has a past surgical history that includes  section; LEEP (); Upper gastrointestinal endoscopy (); Gastric bypass surgery; Appendectomy (2017); laparoscopic appendectomy (N/A, 3/27/2017); Upper gastrointestinal endoscopy (2018); and Upper gastrointestinal endoscopy (N/A, 2018). Restrictions  Restrictions/Precautions  Restrictions/Precautions: Fall Risk  Required Braces or Orthoses?: No  Position Activity Restriction  Other position/activity restrictions: up with assist  Subjective   General  Chart Reviewed: No  Patient assessed for rehabilitation services?: Yes  Response to previous treatment: Patient with no complaints from previous session  Family / Caregiver Present: No  Diagnosis: GI bleed   General Comment  Comments: RN ok'd for therapy this morning. Pt agreeable to participate in session.   Pain Assessment  Patient Currently in Pain: Denies  Vital Signs  Patient Currently in Pain: Denies   Orientation  Orientation  Overall Orientation Status: Within Functional Limits  Objective    ADL  Grooming: Setup;Supervision (Pt washed face with wash cloth

## 2018-11-08 NOTE — PROGRESS NOTES
CL  115*  108*  108*   CO2  23  23  23   BUN  24*  13  11   CREATININE  0.76  0.69  0.68   GLUCOSE  172*  138*  88   CALCIUM  7.2*  8.1*  7.6*       LFTS  No results for input(s): ALKPHOS, ALT, AST, BILITOT, BILIDIR, LABALBU in the last 72 hours. AMYLASE/LIPASE/AMMONIA  Recent Labs      11/07/18   1413   AMMONIA  36       PT/INR  No results for input(s): PROTIME, INR in the last 72 hours. CEA:  No results for input(s): CEA in the last 72 hours. Ca 125:  No results for input(s):  in the last 72 hours. Ca 19-9:   Invalid input(s):   AFP: No results for input(s): AFP in the last 72 hours. Lactic acid:Invalid input(s): LACTIC ACID   Radiology Review:        ASSESSMENT and Plan:  1. Duodenal ulcer with fresh bleeding and pulsating visible vessel treated with Epi and endoclips s/sp EGD 11/5/2018 per Dr. Froy Casey. Pt did have 3 episodes of dark stools/?melena overnight  -Cont with PPI drip  -Monitor and trend hemoglobin q6 hours-transfuse to maintain level >7  -Will discuss with Dr. Froy Casey possible repeating endoscopy tomorrow if pt continues with episodes of dark stools and decrease in hemoglobin    2. H/O Alcoholism-last drink stated was 5 yrs ago but pt has AMS? ?? Etiology unclear at this time.  -Rec [facility] protocol  -Cont banana bag daily    3. Leukocytosis-steroid induced? WBC's slightly improved today    4. H/O Hep C-unsure if pt has received treatment  -Pt to f/u with previous GI specialist at [facility]    Thank you for allowing me to participate in the care of your patient. Please feel free to contact me with any questions or concerns.      Jessica Carbajal, [address]  Gastroenterology  [phone]

## 2018-11-09 LAB
-: NORMAL
-: NORMAL
ABSOLUTE EOS #: 0.16 K/UL (ref 0–0.4)
ABSOLUTE EOS #: 0.19 K/UL (ref 0–0.4)
ABSOLUTE IMMATURE GRANULOCYTE: 0.56 K/UL (ref 0–0.3)
ABSOLUTE IMMATURE GRANULOCYTE: 1.43 K/UL (ref 0–0.3)
ABSOLUTE LYMPH #: 1.91 K/UL (ref 1–4.8)
ABSOLUTE LYMPH #: 3.76 K/UL (ref 1–4.8)
ABSOLUTE MONO #: 1.11 K/UL (ref 0.1–0.8)
ABSOLUTE MONO #: 2.07 K/UL (ref 0.1–0.8)
ANION GAP SERPL CALCULATED.3IONS-SCNC: 11 MMOL/L (ref 9–17)
BASOPHILS # BLD: 0 % (ref 0–2)
BASOPHILS # BLD: 0 % (ref 0–2)
BASOPHILS ABSOLUTE: 0 K/UL (ref 0–0.2)
BASOPHILS ABSOLUTE: 0 K/UL (ref 0–0.2)
BLOOD BANK SPECIMEN: NORMAL
BUN BLDV-MCNC: 24 MG/DL (ref 8–23)
BUN/CREAT BLD: ABNORMAL (ref 9–20)
CALCIUM SERPL-MCNC: 7.3 MG/DL (ref 8.6–10.4)
CHLORIDE BLD-SCNC: 106 MMOL/L (ref 98–107)
CO2: 22 MMOL/L (ref 20–31)
CREAT SERPL-MCNC: 0.78 MG/DL (ref 0.5–0.9)
DIFFERENTIAL TYPE: ABNORMAL
DIFFERENTIAL TYPE: ABNORMAL
EOSINOPHILS RELATIVE PERCENT: 1 % (ref 1–4)
EOSINOPHILS RELATIVE PERCENT: 1 % (ref 1–4)
GFR AFRICAN AMERICAN: >60 ML/MIN
GFR NON-AFRICAN AMERICAN: >60 ML/MIN
GFR SERPL CREATININE-BSD FRML MDRD: ABNORMAL ML/MIN/{1.73_M2}
GFR SERPL CREATININE-BSD FRML MDRD: ABNORMAL ML/MIN/{1.73_M2}
GLUCOSE BLD-MCNC: 113 MG/DL (ref 70–99)
HAPTOGLOBIN: 125 MG/DL (ref 30–200)
HCT VFR BLD CALC: 17.4 % (ref 36.3–47.1)
HCT VFR BLD CALC: 17.7 % (ref 36.3–47.1)
HCT VFR BLD CALC: 18.5 % (ref 36.3–47.1)
HCT VFR BLD CALC: 29.3 % (ref 36.3–47.1)
HCT VFR BLD CALC: 30.3 % (ref 36.3–47.1)
HEMOGLOBIN: 10.2 G/DL (ref 11.9–15.1)
HEMOGLOBIN: 5.6 G/DL (ref 11.9–15.1)
HEMOGLOBIN: 5.8 G/DL (ref 11.9–15.1)
HEMOGLOBIN: 6 G/DL (ref 11.9–15.1)
HEMOGLOBIN: 9.5 G/DL (ref 11.9–15.1)
IMMATURE GRANULOCYTES: 3 %
IMMATURE GRANULOCYTES: 9 %
LACTATE DEHYDROGENASE: 333 U/L (ref 135–214)
LYMPHOCYTES # BLD: 12 % (ref 24–44)
LYMPHOCYTES # BLD: 20 % (ref 24–44)
MAGNESIUM: 1.4 MG/DL (ref 1.6–2.6)
MCH RBC QN AUTO: 30.8 PG (ref 25.2–33.5)
MCH RBC QN AUTO: 30.9 PG (ref 25.2–33.5)
MCHC RBC AUTO-ENTMCNC: 31.6 G/DL (ref 28.4–34.8)
MCHC RBC AUTO-ENTMCNC: 32.4 G/DL (ref 28.4–34.8)
MCV RBC AUTO: 94.9 FL (ref 82.6–102.9)
MCV RBC AUTO: 97.8 FL (ref 82.6–102.9)
MONOCYTES # BLD: 11 % (ref 1–7)
MONOCYTES # BLD: 7 % (ref 1–7)
MORPHOLOGY: ABNORMAL
MORPHOLOGY: ABNORMAL
NRBC AUTOMATED: 12.6 PER 100 WBC
NRBC AUTOMATED: 14 PER 100 WBC
NUCLEATED RED BLOOD CELLS: 14 PER 100 WBC
NUCLEATED RED BLOOD CELLS: 18 PER 100 WBC
PDW BLD-RTO: 21.6 % (ref 11.8–14.4)
PDW BLD-RTO: 21.9 % (ref 11.8–14.4)
PLATELET # BLD: 268 K/UL (ref 138–453)
PLATELET # BLD: 271 K/UL (ref 138–453)
PLATELET ESTIMATE: ABNORMAL
PLATELET ESTIMATE: ABNORMAL
PMV BLD AUTO: 9.6 FL (ref 8.1–13.5)
PMV BLD AUTO: 9.6 FL (ref 8.1–13.5)
POTASSIUM SERPL-SCNC: 3.1 MMOL/L (ref 3.7–5.3)
RBC # BLD: 1.81 M/UL (ref 3.95–5.11)
RBC # BLD: 1.95 M/UL (ref 3.95–5.11)
RBC # BLD: ABNORMAL 10*6/UL
RBC # BLD: ABNORMAL 10*6/UL
REASON FOR REJECTION: NORMAL
REASON FOR REJECTION: NORMAL
SEG NEUTROPHILS: 65 % (ref 36–66)
SEG NEUTROPHILS: 71 % (ref 36–66)
SEGMENTED NEUTROPHILS ABSOLUTE COUNT: 11.29 K/UL (ref 1.8–7.7)
SEGMENTED NEUTROPHILS ABSOLUTE COUNT: 12.22 K/UL (ref 1.8–7.7)
SODIUM BLD-SCNC: 139 MMOL/L (ref 135–144)
TROPONIN INTERP: NORMAL
TROPONIN T: <0.03 NG/ML
WBC # BLD: 15.9 K/UL (ref 3.5–11.3)
WBC # BLD: 18.8 K/UL (ref 3.5–11.3)
WBC # BLD: ABNORMAL 10*3/UL
WBC # BLD: ABNORMAL 10*3/UL
ZZ NTE CLEAN UP: ORDERED TEST: NORMAL
ZZ NTE CLEAN UP: ORDERED TEST: NORMAL
ZZ NTE WITH NAME CLEAN UP: SPECIMEN SOURCE: NORMAL
ZZ NTE WITH NAME CLEAN UP: SPECIMEN SOURCE: NORMAL

## 2018-11-09 PROCEDURE — 83010 ASSAY OF HAPTOGLOBIN QUANT: CPT

## 2018-11-09 PROCEDURE — C9113 INJ PANTOPRAZOLE SODIUM, VIA: HCPCS | Performed by: INTERNAL MEDICINE

## 2018-11-09 PROCEDURE — 2000000000 HC ICU R&B

## 2018-11-09 PROCEDURE — 86901 BLOOD TYPING SEROLOGIC RH(D): CPT

## 2018-11-09 PROCEDURE — 2580000003 HC RX 258: Performed by: EMERGENCY MEDICINE

## 2018-11-09 PROCEDURE — 93005 ELECTROCARDIOGRAM TRACING: CPT

## 2018-11-09 PROCEDURE — 6370000000 HC RX 637 (ALT 250 FOR IP): Performed by: EMERGENCY MEDICINE

## 2018-11-09 PROCEDURE — 2580000003 HC RX 258: Performed by: STUDENT IN AN ORGANIZED HEALTH CARE EDUCATION/TRAINING PROGRAM

## 2018-11-09 PROCEDURE — 83735 ASSAY OF MAGNESIUM: CPT

## 2018-11-09 PROCEDURE — 0DJ08ZZ INSPECTION OF UPPER INTESTINAL TRACT, VIA NATURAL OR ARTIFICIAL OPENING ENDOSCOPIC: ICD-10-PCS | Performed by: INTERNAL MEDICINE

## 2018-11-09 PROCEDURE — 6370000000 HC RX 637 (ALT 250 FOR IP): Performed by: INTERNAL MEDICINE

## 2018-11-09 PROCEDURE — 6360000002 HC RX W HCPCS: Performed by: INTERNAL MEDICINE

## 2018-11-09 PROCEDURE — 85025 COMPLETE CBC W/AUTO DIFF WBC: CPT

## 2018-11-09 PROCEDURE — 6360000002 HC RX W HCPCS: Performed by: EMERGENCY MEDICINE

## 2018-11-09 PROCEDURE — 94640 AIRWAY INHALATION TREATMENT: CPT

## 2018-11-09 PROCEDURE — 80048 BASIC METABOLIC PNL TOTAL CA: CPT

## 2018-11-09 PROCEDURE — 6360000002 HC RX W HCPCS

## 2018-11-09 PROCEDURE — 94762 N-INVAS EAR/PLS OXIMTRY CONT: CPT

## 2018-11-09 PROCEDURE — 6360000002 HC RX W HCPCS: Performed by: STUDENT IN AN ORGANIZED HEALTH CARE EDUCATION/TRAINING PROGRAM

## 2018-11-09 PROCEDURE — 86920 COMPATIBILITY TEST SPIN: CPT

## 2018-11-09 PROCEDURE — 2500000003 HC RX 250 WO HCPCS: Performed by: STUDENT IN AN ORGANIZED HEALTH CARE EDUCATION/TRAINING PROGRAM

## 2018-11-09 PROCEDURE — 99291 CRITICAL CARE FIRST HOUR: CPT | Performed by: INTERNAL MEDICINE

## 2018-11-09 PROCEDURE — 86850 RBC ANTIBODY SCREEN: CPT

## 2018-11-09 PROCEDURE — P9016 RBC LEUKOCYTES REDUCED: HCPCS

## 2018-11-09 PROCEDURE — 86900 BLOOD TYPING SEROLOGIC ABO: CPT

## 2018-11-09 PROCEDURE — 36430 TRANSFUSION BLD/BLD COMPNT: CPT

## 2018-11-09 PROCEDURE — 86078 PHYS BLOOD BANK SERV REACTJ: CPT

## 2018-11-09 PROCEDURE — 36415 COLL VENOUS BLD VENIPUNCTURE: CPT

## 2018-11-09 PROCEDURE — 85014 HEMATOCRIT: CPT

## 2018-11-09 PROCEDURE — 43235 EGD DIAGNOSTIC BRUSH WASH: CPT | Performed by: INTERNAL MEDICINE

## 2018-11-09 PROCEDURE — 84484 ASSAY OF TROPONIN QUANT: CPT

## 2018-11-09 PROCEDURE — 86880 COOMBS TEST DIRECT: CPT

## 2018-11-09 PROCEDURE — 2500000003 HC RX 250 WO HCPCS: Performed by: EMERGENCY MEDICINE

## 2018-11-09 PROCEDURE — 83615 LACTATE (LD) (LDH) ENZYME: CPT

## 2018-11-09 PROCEDURE — 76937 US GUIDE VASCULAR ACCESS: CPT

## 2018-11-09 PROCEDURE — 6370000000 HC RX 637 (ALT 250 FOR IP): Performed by: NURSE PRACTITIONER

## 2018-11-09 PROCEDURE — 2580000003 HC RX 258: Performed by: INTERNAL MEDICINE

## 2018-11-09 PROCEDURE — 85018 HEMOGLOBIN: CPT

## 2018-11-09 PROCEDURE — 3609017100 HC EGD: Performed by: INTERNAL MEDICINE

## 2018-11-09 RX ORDER — LORAZEPAM 2 MG/ML
INJECTION INTRAMUSCULAR
Status: DISPENSED
Start: 2018-11-09 | End: 2018-11-09

## 2018-11-09 RX ORDER — FENTANYL CITRATE 50 UG/ML
INJECTION, SOLUTION INTRAMUSCULAR; INTRAVENOUS
Status: COMPLETED
Start: 2018-11-09 | End: 2018-11-09

## 2018-11-09 RX ORDER — LORAZEPAM 2 MG/ML
1 INJECTION INTRAMUSCULAR ONCE
Status: COMPLETED | OUTPATIENT
Start: 2018-11-09 | End: 2018-11-09

## 2018-11-09 RX ORDER — POTASSIUM CHLORIDE 20MEQ/15ML
40 LIQUID (ML) ORAL ONCE
Status: COMPLETED | OUTPATIENT
Start: 2018-11-09 | End: 2018-11-09

## 2018-11-09 RX ORDER — 0.9 % SODIUM CHLORIDE 0.9 %
250 INTRAVENOUS SOLUTION INTRAVENOUS ONCE
Status: COMPLETED | OUTPATIENT
Start: 2018-11-09 | End: 2018-11-10

## 2018-11-09 RX ORDER — DIPHENHYDRAMINE HYDROCHLORIDE 50 MG/ML
INJECTION INTRAMUSCULAR; INTRAVENOUS
Status: COMPLETED
Start: 2018-11-09 | End: 2018-11-09

## 2018-11-09 RX ORDER — 0.9 % SODIUM CHLORIDE 0.9 %
1000 INTRAVENOUS SOLUTION INTRAVENOUS ONCE
Status: COMPLETED | OUTPATIENT
Start: 2018-11-09 | End: 2018-11-09

## 2018-11-09 RX ORDER — 0.9 % SODIUM CHLORIDE 0.9 %
250 INTRAVENOUS SOLUTION INTRAVENOUS ONCE
Status: COMPLETED | OUTPATIENT
Start: 2018-11-09 | End: 2018-11-09

## 2018-11-09 RX ORDER — LORAZEPAM 2 MG/ML
0.5 INJECTION INTRAMUSCULAR ONCE
Status: COMPLETED | OUTPATIENT
Start: 2018-11-09 | End: 2018-11-09

## 2018-11-09 RX ORDER — MIDAZOLAM HYDROCHLORIDE 1 MG/ML
2 INJECTION INTRAMUSCULAR; INTRAVENOUS ONCE
Status: COMPLETED | OUTPATIENT
Start: 2018-11-09 | End: 2018-11-09

## 2018-11-09 RX ORDER — FENTANYL CITRATE 50 UG/ML
100 INJECTION, SOLUTION INTRAMUSCULAR; INTRAVENOUS ONCE
Status: COMPLETED | OUTPATIENT
Start: 2018-11-09 | End: 2018-11-09

## 2018-11-09 RX ORDER — DIPHENHYDRAMINE HYDROCHLORIDE 50 MG/ML
25 INJECTION INTRAMUSCULAR; INTRAVENOUS ONCE
Status: COMPLETED | OUTPATIENT
Start: 2018-11-09 | End: 2018-11-09

## 2018-11-09 RX ORDER — MIDAZOLAM HYDROCHLORIDE 1 MG/ML
INJECTION INTRAMUSCULAR; INTRAVENOUS
Status: COMPLETED
Start: 2018-11-09 | End: 2018-11-09

## 2018-11-09 RX ORDER — MAGNESIUM SULFATE 1 G/100ML
1 INJECTION INTRAVENOUS
Status: COMPLETED | OUTPATIENT
Start: 2018-11-09 | End: 2018-11-09

## 2018-11-09 RX ORDER — MIDAZOLAM HYDROCHLORIDE 1 MG/ML
4 INJECTION INTRAMUSCULAR; INTRAVENOUS ONCE
Status: COMPLETED | OUTPATIENT
Start: 2018-11-09 | End: 2018-11-09

## 2018-11-09 RX ADMIN — LORAZEPAM 0.5 MG: 2 INJECTION INTRAMUSCULAR; INTRAVENOUS at 04:56

## 2018-11-09 RX ADMIN — OLANZAPINE 5 MG: 10 INJECTION, POWDER, FOR SOLUTION INTRAMUSCULAR at 00:15

## 2018-11-09 RX ADMIN — IPRATROPIUM BROMIDE AND ALBUTEROL SULFATE 3 ML: .5; 3 SOLUTION RESPIRATORY (INHALATION) at 19:41

## 2018-11-09 RX ADMIN — DIPHENHYDRAMINE HYDROCHLORIDE 25 MG: 50 INJECTION INTRAMUSCULAR; INTRAVENOUS at 17:21

## 2018-11-09 RX ADMIN — BISACODYL 20 MG: 5 TABLET, DELAYED RELEASE ORAL at 16:47

## 2018-11-09 RX ADMIN — Medication 10 ML: at 21:26

## 2018-11-09 RX ADMIN — IPRATROPIUM BROMIDE AND ALBUTEROL SULFATE 3 ML: .5; 3 SOLUTION RESPIRATORY (INHALATION) at 12:04

## 2018-11-09 RX ADMIN — IPRATROPIUM BROMIDE AND ALBUTEROL SULFATE 3 ML: .5; 3 SOLUTION RESPIRATORY (INHALATION) at 08:05

## 2018-11-09 RX ADMIN — IPRATROPIUM BROMIDE AND ALBUTEROL SULFATE 3 ML: .5; 3 SOLUTION RESPIRATORY (INHALATION) at 15:24

## 2018-11-09 RX ADMIN — WATER 2.1 ML: 1 INJECTION INTRAMUSCULAR; INTRAVENOUS; SUBCUTANEOUS at 00:15

## 2018-11-09 RX ADMIN — Medication 10 ML: at 07:42

## 2018-11-09 RX ADMIN — POLYETHYLENE GLYCOL 3350, SODIUM SULFATE ANHYDROUS, SODIUM BICARBONATE, SODIUM CHLORIDE, POTASSIUM CHLORIDE 4000 ML: 236; 22.74; 6.74; 5.86; 2.97 POWDER, FOR SOLUTION ORAL at 16:46

## 2018-11-09 RX ADMIN — CARVEDILOL 12.5 MG: 12.5 TABLET, FILM COATED ORAL at 07:41

## 2018-11-09 RX ADMIN — FENTANYL CITRATE 100 MCG: 50 INJECTION, SOLUTION INTRAMUSCULAR; INTRAVENOUS at 11:14

## 2018-11-09 RX ADMIN — MAGNESIUM SULFATE HEPTAHYDRATE 1 G: 1 INJECTION, SOLUTION INTRAVENOUS at 07:48

## 2018-11-09 RX ADMIN — GABAPENTIN 100 MG: 100 CAPSULE ORAL at 07:41

## 2018-11-09 RX ADMIN — MIDAZOLAM HYDROCHLORIDE 4 MG: 1 INJECTION INTRAMUSCULAR; INTRAVENOUS at 11:15

## 2018-11-09 RX ADMIN — LORAZEPAM 1 MG: 2 INJECTION INTRAMUSCULAR; INTRAVENOUS at 01:24

## 2018-11-09 RX ADMIN — BUPRENORPHINE AND NALOXONE 6 TABLET: 2; .5 TABLET SUBLINGUAL at 09:48

## 2018-11-09 RX ADMIN — MAGNESIUM SULFATE HEPTAHYDRATE 1 G: 1 INJECTION, SOLUTION INTRAVENOUS at 09:08

## 2018-11-09 RX ADMIN — MIDAZOLAM HYDROCHLORIDE 2 MG: 1 INJECTION, SOLUTION INTRAMUSCULAR; INTRAVENOUS at 16:57

## 2018-11-09 RX ADMIN — GABAPENTIN 100 MG: 100 CAPSULE ORAL at 13:35

## 2018-11-09 RX ADMIN — SODIUM CHLORIDE 250 ML: 9 INJECTION, SOLUTION INTRAVENOUS at 11:12

## 2018-11-09 RX ADMIN — SODIUM CHLORIDE 8 MG/HR: 9 INJECTION, SOLUTION INTRAVENOUS at 07:50

## 2018-11-09 RX ADMIN — SODIUM CHLORIDE: 4.5 INJECTION, SOLUTION INTRAVENOUS at 07:39

## 2018-11-09 RX ADMIN — CALCIUM GLUCONATE 2 G: 98 INJECTION, SOLUTION INTRAVENOUS at 09:10

## 2018-11-09 RX ADMIN — SODIUM CHLORIDE 1000 ML: 9 INJECTION, SOLUTION INTRAVENOUS at 09:25

## 2018-11-09 RX ADMIN — METOPROLOL TARTRATE 5 MG: 5 INJECTION, SOLUTION INTRAVENOUS at 04:22

## 2018-11-09 RX ADMIN — SODIUM CHLORIDE 8 MG/HR: 9 INJECTION, SOLUTION INTRAVENOUS at 19:16

## 2018-11-09 RX ADMIN — SODIUM CHLORIDE 250 ML: 9 INJECTION, SOLUTION INTRAVENOUS at 07:57

## 2018-11-09 RX ADMIN — MIDAZOLAM HYDROCHLORIDE 4 MG: 1 INJECTION, SOLUTION INTRAMUSCULAR; INTRAVENOUS at 11:15

## 2018-11-09 RX ADMIN — DIPHENHYDRAMINE HYDROCHLORIDE 25 MG: 50 INJECTION, SOLUTION INTRAMUSCULAR; INTRAVENOUS at 17:21

## 2018-11-09 RX ADMIN — POTASSIUM CHLORIDE 40 MEQ: 40 SOLUTION ORAL at 07:48

## 2018-11-09 RX ADMIN — SODIUM CHLORIDE 250 ML: 9 INJECTION, SOLUTION INTRAVENOUS at 07:40

## 2018-11-09 ASSESSMENT — PAIN SCALES - GENERAL
PAINLEVEL_OUTOF10: 0

## 2018-11-09 NOTE — PROGRESS NOTES
INTENSIVE CARE UNIT  Resident Physician Progress Note    Patient - Emi Daniels  Date of Admission -  2018  1:57 PM  Date of Evaluation -  2018  Room and Bed Number -  0117/0117-01   Hospital Day - 5  CC- GI BLEED     SUBJECTIVE:     OVERNIGHT EVENTS:   Pt with some agitation overnight, received Zyprexa and ativan.  ~7 dark red BMs overnight. Good UOP.     TODAY:      AWAKE & FOLLOWING COMMANDS:  [] No   [x] Yes BUT CONFUSED     SECRETIONS Amount:  [] Small [] Moderate  [] Large  [x] None  Color:     [] White [] Colored  [] Bloody    SEDATION:  RAAS Score:  [] Propofol gtt  [] Versed gtt  [] Ativan gtt   [x] No Sedation    PARALYZED:  [x] No    [] Yes    VASOPRESSORS:  [x] No    [] Yes  [] Levophed [] Dopamine [] Vasopressin  [] Dobutamine [] Phenylephrine [] Epinephrine      OBJECTIVE:     VITAL SIGNS:  BP (!) 145/60   Pulse 123   Temp 97.4 °F (36.3 °C) (Oral)   Resp 21   Ht 4' 11\" (1.499 m)   Wt 139 lb 1.8 oz (63.1 kg)   LMP 2015   SpO2 99%   BMI 28.10 kg/m²   Tmax over 24 hours:  Temp (24hrs), Av.5 °F (36.9 °C), Min:97.4 °F (36.3 °C), Max:99 °F (37.2 °C)      Patient Vitals for the past 8 hrs:   BP Temp Temp src Pulse Resp SpO2 Weight   18 0753 (!) 145/60 97.4 °F (36.3 °C) Oral 123 21 99 % -   18 0600 (!) 111/14 - - 117 22 98 % -   18 0500 103/87 - - 112 19 98 % -   18 0400 - 99 °F (37.2 °C) Axillary 124 24 96 % 139 lb 1.8 oz (63.1 kg)   18 0300 - - - 121 21 96 % -   18 0200 - - - 119 23 94 % -   18 0100 - - - 117 19 97 % -         Intake/Output Summary (Last 24 hours) at 18 0804  Last data filed at 18 0400   Gross per 24 hour   Intake             1474 ml   Output              250 ml   Net             1224 ml       Date 18 0000 - 18 235   Shift 3333-7884 8777-4277 0116-3681 24 Hour Total   I  N  T  A  K  E   I.V.  (mL/kg) 431  (6.8)   431  (6.8)    Shift Total  (mL/kg) 431  (6.8)   431  (6.8)   O  U  T  P  U  T   Shift UROBILINOGEN Normal 11/04/2018    BILIRUBINUR NEGATIVE 11/04/2018    BILIRUBINUR 1+ 04/02/2012    GLUCOSEU NEGATIVE 11/04/2018    GLUCOSEU NEGATIVE 04/02/2012    KETUA NEGATIVE 11/04/2018    AMORPHOUS NOT REPORTED 11/04/2018       HgBA1c:    Lab Results   Component Value Date    LABA1C 5.5 08/11/2018     TSH:    Lab Results   Component Value Date    TSH 0.37 10/10/2018     Lactic Acid:   Lab Results   Component Value Date    LACTA NOT REPORTED 08/12/2018    LACTA NOT REPORTED 08/11/2018      Troponin: No results for input(s): TROPONINI in the last 72 hours. Microbiology:  Blood Cx 11/5 NGTD  Urine Cx 11/4 NGTD    ASSESSMENT:     Patient Active Problem List    Diagnosis Date Noted    Alcoholic cirrhosis of liver without ascites ([facility]     Upper GI bleed 11/04/2018    COPD (chronic obstructive pulmonary disease) ([facility] 11/04/2018    Decompensated COPD with exacerbation (chronic obstructive pulmonary disease) ([facility] 10/24/2018    Acute on chronic congestive heart failure ([facility]     Chronic heart failure with reduced ejection fraction and diastolic dysfunction ([facility] 10/23/2018    Hypertension 10/11/2018    Hepatitis C 10/11/2018    History of substance abuse 10/11/2018    COPD exacerbation ([facility] 08/11/2018          PLAN:     WEAN PER PROTOCOL:  [] No   [] Yes  [x] N/A    ICU PROPHYLAXIS:  Stress ulcer:  [x] PPI Agent  [] [id number] [] Sucralfate  [] Other:  VTE:   [] Enoxaparin  [] Unfract.  Heparin Subcut  [x] EPC Cuffs    NUTRITION:  [x] NPO [] Tube Feeding (Specify: ) [] TPN  [] PO    HOME MEDS RECONCILED: [] No  [x] Yes    CONSULTATION NEEDED:  [x] No  [] Yes    FAMILY UPDATED:    [] No  [x] Yes    TRANSFER OUT OF ICU:   [x] No  [] Yes      GI Bleed- Duodenal Ulcer  -Hgb 12.6 (10/25) 10.3->7.8 --> 8.6 (after 1 unit PRBC)--> 9.5 (11/5)-->EGD--> 3.8 transfused 2 U PRBC on 11/6, 6.8 and transfused 1 U PRBC on 11/7--> 7.5-->6.0-->5.6, will transfuse 2 units, GI to scope again this morning  ~7 dark red BMs of data including imaging and labs, discussions with other team members and physicians at least 27   Min so far today, excluding procedures. Treatment plan Discussed with nursing staff in detail , all questions answered . Electronically signed by Salvador Velez MD on   11/9/18 at 7:25 PM    Please note that this chart was generated using voice recognition Dragon dictation software. Although every effort was made to ensure the accuracy of this automated transcription, some errors in transcription may have occurred.

## 2018-11-10 LAB
ABO/RH: NORMAL
ABSOLUTE EOS #: 0.38 K/UL (ref 0–0.4)
ABSOLUTE IMMATURE GRANULOCYTE: 0.75 K/UL (ref 0–0.3)
ABSOLUTE LYMPH #: 1.5 K/UL (ref 1–4.8)
ABSOLUTE MONO #: 0.63 K/UL (ref 0.1–0.8)
ANION GAP SERPL CALCULATED.3IONS-SCNC: 9 MMOL/L (ref 9–17)
ANTIBODY SCREEN: NEGATIVE
ARM BAND NUMBER: NORMAL
BASOPHILS # BLD: 0 % (ref 0–2)
BASOPHILS ABSOLUTE: 0 K/UL (ref 0–0.2)
BLD PROD TYP BPU: NORMAL
BUN BLDV-MCNC: 9 MG/DL (ref 8–23)
BUN/CREAT BLD: ABNORMAL (ref 9–20)
CALCIUM SERPL-MCNC: 7.5 MG/DL (ref 8.6–10.4)
CHLORIDE BLD-SCNC: 106 MMOL/L (ref 98–107)
CO2: 23 MMOL/L (ref 20–31)
CREAT SERPL-MCNC: 0.62 MG/DL (ref 0.5–0.9)
CROSSMATCH RESULT: NORMAL
DAT IGG: NEGATIVE
DIFFERENTIAL TYPE: ABNORMAL
DISPENSE STATUS BLOOD BANK: NORMAL
EKG ATRIAL RATE: 127 BPM
EKG P AXIS: 75 DEGREES
EKG P-R INTERVAL: 124 MS
EKG Q-T INTERVAL: 292 MS
EKG QRS DURATION: 66 MS
EKG QTC CALCULATION (BAZETT): 424 MS
EKG R AXIS: 72 DEGREES
EKG T AXIS: 75 DEGREES
EKG VENTRICULAR RATE: 127 BPM
EOSINOPHILS RELATIVE PERCENT: 3 % (ref 1–4)
EXPIRATION DATE: NORMAL
GFR AFRICAN AMERICAN: >60 ML/MIN
GFR NON-AFRICAN AMERICAN: >60 ML/MIN
GFR SERPL CREATININE-BSD FRML MDRD: ABNORMAL ML/MIN/{1.73_M2}
GFR SERPL CREATININE-BSD FRML MDRD: ABNORMAL ML/MIN/{1.73_M2}
GLUCOSE BLD-MCNC: 77 MG/DL (ref 70–99)
HCT VFR BLD CALC: 27 % (ref 36.3–47.1)
HCT VFR BLD CALC: 28.3 % (ref 36.3–47.1)
HEMOGLOBIN: 9.2 G/DL (ref 11.9–15.1)
HEMOGLOBIN: 9.8 G/DL (ref 11.9–15.1)
IMMATURE GRANULOCYTES: 6 %
LYMPHOCYTES # BLD: 12 % (ref 24–44)
MCH RBC QN AUTO: 30.8 PG (ref 25.2–33.5)
MCHC RBC AUTO-ENTMCNC: 34.6 G/DL (ref 28.4–34.8)
MCV RBC AUTO: 89 FL (ref 82.6–102.9)
MONOCYTES # BLD: 5 % (ref 1–7)
MORPHOLOGY: ABNORMAL
NRBC AUTOMATED: 6.9 PER 100 WBC
NUCLEATED RED BLOOD CELLS: 9 PER 100 WBC
PDW BLD-RTO: 19.1 % (ref 11.8–14.4)
PLATELET # BLD: ABNORMAL K/UL (ref 138–453)
PLATELET ESTIMATE: ABNORMAL
PLATELET, FLUORESCENCE: NORMAL K/UL (ref 138–453)
PLATELET, IMMATURE FRACTION: NORMAL % (ref 1.1–10.3)
PMV BLD AUTO: ABNORMAL FL (ref 8.1–13.5)
POTASSIUM SERPL-SCNC: 4.2 MMOL/L (ref 3.7–5.3)
RBC # BLD: 3.18 M/UL (ref 3.95–5.11)
RBC # BLD: ABNORMAL 10*6/UL
SEG NEUTROPHILS: 74 % (ref 36–66)
SEGMENTED NEUTROPHILS ABSOLUTE COUNT: 9.24 K/UL (ref 1.8–7.7)
SODIUM BLD-SCNC: 138 MMOL/L (ref 135–144)
TRANSFUSION STATUS: NORMAL
UNIT DIVISION: 0
UNIT NUMBER: NORMAL
WBC # BLD: 12.5 K/UL (ref 3.5–11.3)
WBC # BLD: ABNORMAL 10*3/UL

## 2018-11-10 PROCEDURE — 85025 COMPLETE CBC W/AUTO DIFF WBC: CPT

## 2018-11-10 PROCEDURE — 45380 COLONOSCOPY AND BIOPSY: CPT | Performed by: INTERNAL MEDICINE

## 2018-11-10 PROCEDURE — 3609010300 HC COLONOSCOPY W/BIOPSY SINGLE/MULTIPLE: Performed by: INTERNAL MEDICINE

## 2018-11-10 PROCEDURE — 6360000002 HC RX W HCPCS: Performed by: EMERGENCY MEDICINE

## 2018-11-10 PROCEDURE — 2580000003 HC RX 258: Performed by: EMERGENCY MEDICINE

## 2018-11-10 PROCEDURE — 6360000002 HC RX W HCPCS: Performed by: INTERNAL MEDICINE

## 2018-11-10 PROCEDURE — 80048 BASIC METABOLIC PNL TOTAL CA: CPT

## 2018-11-10 PROCEDURE — C9113 INJ PANTOPRAZOLE SODIUM, VIA: HCPCS | Performed by: INTERNAL MEDICINE

## 2018-11-10 PROCEDURE — 2580000003 HC RX 258: Performed by: INTERNAL MEDICINE

## 2018-11-10 PROCEDURE — 94762 N-INVAS EAR/PLS OXIMTRY CONT: CPT

## 2018-11-10 PROCEDURE — 85018 HEMOGLOBIN: CPT

## 2018-11-10 PROCEDURE — 6370000000 HC RX 637 (ALT 250 FOR IP): Performed by: INTERNAL MEDICINE

## 2018-11-10 PROCEDURE — 94640 AIRWAY INHALATION TREATMENT: CPT

## 2018-11-10 PROCEDURE — 6370000000 HC RX 637 (ALT 250 FOR IP): Performed by: EMERGENCY MEDICINE

## 2018-11-10 PROCEDURE — 0DBC8ZX EXCISION OF ILEOCECAL VALVE, VIA NATURAL OR ARTIFICIAL OPENING ENDOSCOPIC, DIAGNOSTIC: ICD-10-PCS | Performed by: INTERNAL MEDICINE

## 2018-11-10 PROCEDURE — 2000000000 HC ICU R&B

## 2018-11-10 PROCEDURE — 2700000000 HC OXYGEN THERAPY PER DAY

## 2018-11-10 PROCEDURE — 99291 CRITICAL CARE FIRST HOUR: CPT | Performed by: INTERNAL MEDICINE

## 2018-11-10 PROCEDURE — 85055 RETICULATED PLATELET ASSAY: CPT

## 2018-11-10 PROCEDURE — 85014 HEMATOCRIT: CPT

## 2018-11-10 PROCEDURE — 2709999900 HC NON-CHARGEABLE SUPPLY: Performed by: INTERNAL MEDICINE

## 2018-11-10 PROCEDURE — 36415 COLL VENOUS BLD VENIPUNCTURE: CPT

## 2018-11-10 PROCEDURE — 2580000003 HC RX 258: Performed by: STUDENT IN AN ORGANIZED HEALTH CARE EDUCATION/TRAINING PROGRAM

## 2018-11-10 PROCEDURE — 82803 BLOOD GASES ANY COMBINATION: CPT

## 2018-11-10 PROCEDURE — 88305 TISSUE EXAM BY PATHOLOGIST: CPT

## 2018-11-10 RX ORDER — FENTANYL CITRATE 50 UG/ML
INJECTION, SOLUTION INTRAMUSCULAR; INTRAVENOUS
Status: DISPENSED
Start: 2018-11-10 | End: 2018-11-10

## 2018-11-10 RX ORDER — MIDAZOLAM HYDROCHLORIDE 1 MG/ML
4 INJECTION INTRAMUSCULAR; INTRAVENOUS ONCE
Status: COMPLETED | OUTPATIENT
Start: 2018-11-10 | End: 2018-11-10

## 2018-11-10 RX ORDER — IPRATROPIUM BROMIDE AND ALBUTEROL SULFATE 2.5; .5 MG/3ML; MG/3ML
1 SOLUTION RESPIRATORY (INHALATION) 4 TIMES DAILY
Status: DISCONTINUED | OUTPATIENT
Start: 2018-11-10 | End: 2018-11-13 | Stop reason: HOSPADM

## 2018-11-10 RX ORDER — FENTANYL CITRATE 50 UG/ML
75 INJECTION, SOLUTION INTRAMUSCULAR; INTRAVENOUS ONCE
Status: COMPLETED | OUTPATIENT
Start: 2018-11-10 | End: 2018-11-10

## 2018-11-10 RX ORDER — LORAZEPAM 2 MG/ML
1 INJECTION INTRAMUSCULAR ONCE
Status: COMPLETED | OUTPATIENT
Start: 2018-11-10 | End: 2018-11-10

## 2018-11-10 RX ORDER — MIDAZOLAM HYDROCHLORIDE 1 MG/ML
INJECTION INTRAMUSCULAR; INTRAVENOUS
Status: DISPENSED
Start: 2018-11-10 | End: 2018-11-10

## 2018-11-10 RX ORDER — GABAPENTIN 100 MG/1
200 CAPSULE ORAL 3 TIMES DAILY
Status: DISCONTINUED | OUTPATIENT
Start: 2018-11-10 | End: 2018-11-13 | Stop reason: HOSPADM

## 2018-11-10 RX ADMIN — GABAPENTIN 100 MG: 100 CAPSULE ORAL at 08:40

## 2018-11-10 RX ADMIN — IPRATROPIUM BROMIDE AND ALBUTEROL SULFATE 1 AMPULE: .5; 3 SOLUTION RESPIRATORY (INHALATION) at 19:53

## 2018-11-10 RX ADMIN — Medication 10 ML: at 20:39

## 2018-11-10 RX ADMIN — IPRATROPIUM BROMIDE AND ALBUTEROL SULFATE 1 AMPULE: .5; 3 SOLUTION RESPIRATORY (INHALATION) at 15:43

## 2018-11-10 RX ADMIN — SODIUM CHLORIDE 8 MG/HR: 9 INJECTION, SOLUTION INTRAVENOUS at 19:08

## 2018-11-10 RX ADMIN — IPRATROPIUM BROMIDE AND ALBUTEROL SULFATE 1 AMPULE: .5; 3 SOLUTION RESPIRATORY (INHALATION) at 12:00

## 2018-11-10 RX ADMIN — GABAPENTIN 100 MG: 100 CAPSULE ORAL at 14:04

## 2018-11-10 RX ADMIN — SODIUM CHLORIDE: 4.5 INJECTION, SOLUTION INTRAVENOUS at 02:25

## 2018-11-10 RX ADMIN — GABAPENTIN 200 MG: 100 CAPSULE ORAL at 20:38

## 2018-11-10 RX ADMIN — MIDAZOLAM HYDROCHLORIDE 4 MG: 1 INJECTION, SOLUTION INTRAMUSCULAR; INTRAVENOUS at 12:20

## 2018-11-10 RX ADMIN — BUPRENORPHINE AND NALOXONE 6 TABLET: 2; .5 TABLET SUBLINGUAL at 09:07

## 2018-11-10 RX ADMIN — Medication 10 ML: at 08:34

## 2018-11-10 RX ADMIN — SODIUM CHLORIDE 8 MG/HR: 9 INJECTION, SOLUTION INTRAVENOUS at 08:07

## 2018-11-10 RX ADMIN — IPRATROPIUM BROMIDE AND ALBUTEROL SULFATE 1 AMPULE: .5; 3 SOLUTION RESPIRATORY (INHALATION) at 08:50

## 2018-11-10 RX ADMIN — LORAZEPAM 1 MG: 2 INJECTION INTRAMUSCULAR; INTRAVENOUS at 01:26

## 2018-11-10 RX ADMIN — FENTANYL CITRATE 75 MCG: 50 INJECTION, SOLUTION INTRAMUSCULAR; INTRAVENOUS at 11:09

## 2018-11-10 RX ADMIN — SODIUM CHLORIDE: 4.5 INJECTION, SOLUTION INTRAVENOUS at 22:56

## 2018-11-10 ASSESSMENT — PAIN SCALES - GENERAL
PAINLEVEL_OUTOF10: 8
PAINLEVEL_OUTOF10: 7
PAINLEVEL_OUTOF10: 0
PAINLEVEL_OUTOF10: 0

## 2018-11-10 NOTE — PROGRESS NOTES
INTENSIVE CARE UNIT  Resident Physician Progress Note    Patient - Michelle Daniels  Date of Admission -  2018  1:57 PM  Date of Evaluation -  11/10/2018  Room and Bed Number -  0117/0117-01   Hospital Day - 6  CC- GI BLEED     SUBJECTIVE:     OVERNIGHT EVENTS:   Patient seen and examined. Continues to have maroon colored stools. Hgb at 9.8 this am. Somnolent, follows commands. Claims she had a headache and abdominal pain this morning.      AWAKE & FOLLOWING COMMANDS:  [] No   [x] Yes BUT CONFUSED     SECRETIONS Amount:  [] Small [] Moderate  [] Large  [x] None  Color:     [] White [] Colored  [] Bloody    SEDATION:  RAAS Score:  [] Propofol gtt  [] Versed gtt  [] Ativan gtt   [x] No Sedation    PARALYZED:  [x] No    [] Yes    VASOPRESSORS:  [x] No    [] Yes  [] Levophed [] Dopamine [] Vasopressin  [] Dobutamine [] Phenylephrine [] Epinephrine      OBJECTIVE:     VITAL SIGNS:  /65   Pulse 100   Temp 98.2 °F (36.8 °C)   Resp 21   Ht 4' 11\" (1.499 m)   Wt 139 lb 1.8 oz (63.1 kg)   LMP 2015   SpO2 98%   BMI 28.10 kg/m²   Tmax over 24 hours:  Temp (24hrs), Av.7 °F (37.1 °C), Min:98 °F (36.7 °C), Max:100.2 °F (37.9 °C)      Patient Vitals for the past 8 hrs:   BP Temp src Pulse Resp SpO2   11/10/18 0700 130/65 - 100 21 -   11/10/18 0600 (!) 157/77 - 96 19 -   11/10/18 0400 (!) 161/61 Oral 97 19 98 %   11/10/18 0300 (!) 164/77 - 98 19 96 %         Intake/Output Summary (Last 24 hours) at 11/10/18 0805  Last data filed at 11/10/18 0400   Gross per 24 hour   Intake             5176 ml   Output                0 ml   Net             5176 ml       Date 11/10/18 0000 - 11/10/18 2359   Shift 1182-4453 4927-9164 7945-2742 24 Hour Total   I  N  T  A  K  E   I.V.  (mL/kg) 390  (6.2)   390  (6.2)    NG/GT  (mL/kg) 1350  (21.4)   1350  (21.4)    Shift Total  (mL/kg) 1740  (27.6)   1740  (27.6)   O  U  T  P  U  T   Shift Total  (mL/kg)       Weight (kg) 63.1 63.1 63.1 63.1     Wt Readings from Last 3 no ascites.      Substance abuse  -Continue Suboxone     Renal  Ca, Mg, K replace PRN     Encephaolopathy  -Multifactorial, acute illness, not sleeping well, hx substance abuse, did not receive Suboxone for 2 days, restarted yesterday, Cx w/ NGTD, ammonia WNL. CT head unremarkable. -Zyprexa as needed at night  Overall, mentation improving     DVT ppx: Mechanical boots. GI ppx: Protonix Drip     Neli Tejada DO  General Surgery Resident, PGY 2  [facility]  11/10/2018 8:05 AM   Attending Physician Statement  I have discussed the care of Emi Daniels, including pertinent history and exam findings,  with the resident. I have seen and examined the patient and the key elements of all parts of the encounter have been performed by me. I agree with the assessment, plan and orders as documented by the resident with additions . Colonoscopy noted   If hb stable transfer to floor   Dc ng   Once transferred cc sign off       Total critical care time caring for this patient with life threatening, unstable organ failure, including direct patient contact, management of life support systems, review of data including imaging and labs, discussions with other team members and physicians at least 27   Min so far today, excluding procedures. Treatment plan Discussed with nursing staff in detail , all questions answered . Electronically signed by Marylee Peto, MD on   11/10/18 at 9:16 PM    Please note that this chart was generated using voice recognition Dragon dictation software. Although every effort was made to ensure the accuracy of this automated transcription, some errors in transcription may have occurred.

## 2018-11-11 PROBLEM — K92.2 UPPER GI BLEED: Status: RESOLVED | Noted: 2018-11-04 | Resolved: 2018-11-11

## 2018-11-11 LAB
ABSOLUTE EOS #: 0.36 K/UL (ref 0–0.44)
ABSOLUTE IMMATURE GRANULOCYTE: 0.24 K/UL (ref 0–0.3)
ABSOLUTE LYMPH #: 2.25 K/UL (ref 1.1–3.7)
ABSOLUTE MONO #: 0.62 K/UL (ref 0.1–1.2)
ANION GAP SERPL CALCULATED.3IONS-SCNC: 11 MMOL/L (ref 9–17)
BASOPHILS # BLD: 0 % (ref 0–2)
BASOPHILS ABSOLUTE: 0.03 K/UL (ref 0–0.2)
BUN BLDV-MCNC: 4 MG/DL (ref 8–23)
BUN/CREAT BLD: ABNORMAL (ref 9–20)
CALCIUM IONIZED: 1.17 MMOL/L (ref 1.13–1.33)
CALCIUM SERPL-MCNC: 7.7 MG/DL (ref 8.6–10.4)
CHLORIDE BLD-SCNC: 102 MMOL/L (ref 98–107)
CO2: 24 MMOL/L (ref 20–31)
CREAT SERPL-MCNC: 0.63 MG/DL (ref 0.5–0.9)
DIFFERENTIAL TYPE: ABNORMAL
EOSINOPHILS RELATIVE PERCENT: 4 % (ref 1–4)
GFR AFRICAN AMERICAN: >60 ML/MIN
GFR NON-AFRICAN AMERICAN: >60 ML/MIN
GFR SERPL CREATININE-BSD FRML MDRD: ABNORMAL ML/MIN/{1.73_M2}
GFR SERPL CREATININE-BSD FRML MDRD: ABNORMAL ML/MIN/{1.73_M2}
GLUCOSE BLD-MCNC: 78 MG/DL (ref 70–99)
HCT VFR BLD CALC: 29.3 % (ref 36.3–47.1)
HEMOGLOBIN: 9.7 G/DL (ref 11.9–15.1)
IMMATURE GRANULOCYTES: 3 %
LYMPHOCYTES # BLD: 24 % (ref 24–43)
MAGNESIUM: 1.5 MG/DL (ref 1.6–2.6)
MCH RBC QN AUTO: 30.1 PG (ref 25.2–33.5)
MCHC RBC AUTO-ENTMCNC: 33.1 G/DL (ref 28.4–34.8)
MCV RBC AUTO: 91 FL (ref 82.6–102.9)
MONOCYTES # BLD: 7 % (ref 3–12)
NRBC AUTOMATED: 1.5 PER 100 WBC
PDW BLD-RTO: 18.7 % (ref 11.8–14.4)
PLATELET # BLD: ABNORMAL K/UL (ref 138–453)
PLATELET ESTIMATE: ABNORMAL
PLATELET, FLUORESCENCE: NORMAL K/UL (ref 138–453)
PLATELET, IMMATURE FRACTION: NORMAL % (ref 1.1–10.3)
PMV BLD AUTO: ABNORMAL FL (ref 8.1–13.5)
POTASSIUM SERPL-SCNC: 3.4 MMOL/L (ref 3.7–5.3)
RBC # BLD: 3.22 M/UL (ref 3.95–5.11)
RBC # BLD: ABNORMAL 10*6/UL
SEG NEUTROPHILS: 63 % (ref 36–65)
SEGMENTED NEUTROPHILS ABSOLUTE COUNT: 5.95 K/UL (ref 1.5–8.1)
SODIUM BLD-SCNC: 137 MMOL/L (ref 135–144)
WBC # BLD: 9.5 K/UL (ref 3.5–11.3)
WBC # BLD: ABNORMAL 10*3/UL

## 2018-11-11 PROCEDURE — 36415 COLL VENOUS BLD VENIPUNCTURE: CPT

## 2018-11-11 PROCEDURE — 80048 BASIC METABOLIC PNL TOTAL CA: CPT

## 2018-11-11 PROCEDURE — 6370000000 HC RX 637 (ALT 250 FOR IP): Performed by: INTERNAL MEDICINE

## 2018-11-11 PROCEDURE — 2060000000 HC ICU INTERMEDIATE R&B

## 2018-11-11 PROCEDURE — 2580000003 HC RX 258: Performed by: STUDENT IN AN ORGANIZED HEALTH CARE EDUCATION/TRAINING PROGRAM

## 2018-11-11 PROCEDURE — 2580000003 HC RX 258: Performed by: INTERNAL MEDICINE

## 2018-11-11 PROCEDURE — 85025 COMPLETE CBC W/AUTO DIFF WBC: CPT

## 2018-11-11 PROCEDURE — 6360000002 HC RX W HCPCS: Performed by: INTERNAL MEDICINE

## 2018-11-11 PROCEDURE — 99233 SBSQ HOSP IP/OBS HIGH 50: CPT | Performed by: INTERNAL MEDICINE

## 2018-11-11 PROCEDURE — 6370000000 HC RX 637 (ALT 250 FOR IP): Performed by: STUDENT IN AN ORGANIZED HEALTH CARE EDUCATION/TRAINING PROGRAM

## 2018-11-11 PROCEDURE — 6360000002 HC RX W HCPCS: Performed by: EMERGENCY MEDICINE

## 2018-11-11 PROCEDURE — 83735 ASSAY OF MAGNESIUM: CPT

## 2018-11-11 PROCEDURE — 94640 AIRWAY INHALATION TREATMENT: CPT

## 2018-11-11 PROCEDURE — 6360000002 HC RX W HCPCS: Performed by: STUDENT IN AN ORGANIZED HEALTH CARE EDUCATION/TRAINING PROGRAM

## 2018-11-11 PROCEDURE — C9113 INJ PANTOPRAZOLE SODIUM, VIA: HCPCS | Performed by: INTERNAL MEDICINE

## 2018-11-11 PROCEDURE — 85055 RETICULATED PLATELET ASSAY: CPT

## 2018-11-11 PROCEDURE — 2580000003 HC RX 258: Performed by: EMERGENCY MEDICINE

## 2018-11-11 PROCEDURE — 94762 N-INVAS EAR/PLS OXIMTRY CONT: CPT

## 2018-11-11 PROCEDURE — 82330 ASSAY OF CALCIUM: CPT

## 2018-11-11 RX ORDER — CARVEDILOL 3.12 MG/1
3.12 TABLET ORAL 2 TIMES DAILY WITH MEALS
Status: DISCONTINUED | OUTPATIENT
Start: 2018-11-11 | End: 2018-11-12

## 2018-11-11 RX ORDER — POTASSIUM CHLORIDE 20 MEQ/1
40 TABLET, EXTENDED RELEASE ORAL ONCE
Status: COMPLETED | OUTPATIENT
Start: 2018-11-11 | End: 2018-11-11

## 2018-11-11 RX ORDER — PANTOPRAZOLE SODIUM 40 MG/1
40 TABLET, DELAYED RELEASE ORAL
Status: DISCONTINUED | OUTPATIENT
Start: 2018-11-11 | End: 2018-11-13 | Stop reason: HOSPADM

## 2018-11-11 RX ORDER — MAGNESIUM SULFATE 1 G/100ML
1 INJECTION INTRAVENOUS ONCE
Status: COMPLETED | OUTPATIENT
Start: 2018-11-11 | End: 2018-11-11

## 2018-11-11 RX ORDER — HALOPERIDOL 5 MG/ML
2 INJECTION INTRAMUSCULAR EVERY 6 HOURS PRN
Status: DISCONTINUED | OUTPATIENT
Start: 2018-11-11 | End: 2018-11-12

## 2018-11-11 RX ADMIN — CARVEDILOL 3.12 MG: 3.12 TABLET, FILM COATED ORAL at 11:08

## 2018-11-11 RX ADMIN — GABAPENTIN 200 MG: 100 CAPSULE ORAL at 14:39

## 2018-11-11 RX ADMIN — MAGNESIUM SULFATE HEPTAHYDRATE 1 G: 1 INJECTION, SOLUTION INTRAVENOUS at 09:27

## 2018-11-11 RX ADMIN — BUPRENORPHINE AND NALOXONE 6 TABLET: 2; .5 TABLET SUBLINGUAL at 08:47

## 2018-11-11 RX ADMIN — IPRATROPIUM BROMIDE AND ALBUTEROL SULFATE 1 AMPULE: .5; 3 SOLUTION RESPIRATORY (INHALATION) at 08:31

## 2018-11-11 RX ADMIN — SODIUM CHLORIDE 8 MG/HR: 9 INJECTION, SOLUTION INTRAVENOUS at 06:24

## 2018-11-11 RX ADMIN — IPRATROPIUM BROMIDE AND ALBUTEROL SULFATE 1 AMPULE: .5; 3 SOLUTION RESPIRATORY (INHALATION) at 20:06

## 2018-11-11 RX ADMIN — PANTOPRAZOLE SODIUM 40 MG: 40 TABLET, DELAYED RELEASE ORAL at 08:46

## 2018-11-11 RX ADMIN — IPRATROPIUM BROMIDE AND ALBUTEROL SULFATE 1 AMPULE: .5; 3 SOLUTION RESPIRATORY (INHALATION) at 15:20

## 2018-11-11 RX ADMIN — GABAPENTIN 200 MG: 100 CAPSULE ORAL at 08:46

## 2018-11-11 RX ADMIN — IPRATROPIUM BROMIDE AND ALBUTEROL SULFATE 1 AMPULE: .5; 3 SOLUTION RESPIRATORY (INHALATION) at 11:40

## 2018-11-11 RX ADMIN — CARVEDILOL 3.12 MG: 3.12 TABLET, FILM COATED ORAL at 16:23

## 2018-11-11 RX ADMIN — PANTOPRAZOLE SODIUM 40 MG: 40 TABLET, DELAYED RELEASE ORAL at 15:30

## 2018-11-11 RX ADMIN — POTASSIUM CHLORIDE 40 MEQ: 20 TABLET, EXTENDED RELEASE ORAL at 08:47

## 2018-11-11 RX ADMIN — Medication 10 ML: at 08:51

## 2018-11-11 RX ADMIN — GABAPENTIN 200 MG: 100 CAPSULE ORAL at 21:27

## 2018-11-11 RX ADMIN — CALCIUM GLUCONATE 1 G: 98 INJECTION, SOLUTION INTRAVENOUS at 08:23

## 2018-11-11 ASSESSMENT — PAIN SCALES - GENERAL
PAINLEVEL_OUTOF10: 0
PAINLEVEL_OUTOF10: 2
PAINLEVEL_OUTOF10: 8
PAINLEVEL_OUTOF10: 3
PAINLEVEL_OUTOF10: 9
PAINLEVEL_OUTOF10: 0

## 2018-11-11 ASSESSMENT — PAIN DESCRIPTION - PAIN TYPE
TYPE: ACUTE PAIN
TYPE: CHRONIC PAIN

## 2018-11-11 ASSESSMENT — PAIN DESCRIPTION - LOCATION: LOCATION: CHEST

## 2018-11-11 ASSESSMENT — PAIN DESCRIPTION - ORIENTATION: ORIENTATION: ANTERIOR;MID

## 2018-11-11 NOTE — PROGRESS NOTES
negative 11/4/18  -US abd 10/24 showed no ascites.      Substance abuse  -Continue Suboxone      Renal  Ca, Mg, K replace PRN     Encephaolopathy  -Multifactorial, acute illness, not sleeping well, hx substance abuse, did not receive Suboxone for 2 days, restarted yesterday, Cx w/ NGTD, ammonia WNL. CT head unremarkable. -Zyprexa as needed at night  Overall, mentation improving     DVT ppx: EPC cuffs  GI ppx: Protonix BID      Above mentioned assessment and plan was discussed by me with the admitting medicine resident. The medicine team assigned to the patient by medicine admitting resident will be following up the patient from now onwards on the floor.      Roxana Grewal M.D.  PGY - 2  Department of Internal Medicine/ Critical care  [facility])             11/11/2018, 1:22 PM

## 2018-11-11 NOTE — PROGRESS NOTES
THE [facility] Progress Note    Emi Delgado is a 70 y.o. female patient. Hospitalization Day:7      Chief consult reason:   GIB-melena, abdominal pain, h/o Hep C   Subjective:  Pt seen and examined. Pt sitting up in chair, talking with staff and family. No s/s of active bleeding overnight. VSS    Pt had bedside colonoscopy done yesterday indicating:  IMPRESSION:   1) Area of linear ulcerations adjacent to the IC valve with surrounding erythema involving the inferior portion of the valve. No signs of active bleeding, suspect recent hemorrhage. No bleeding vessels identified. Findings could represent isolated ischemic colonic ischemic (IRCI) or NSAID induced colopathy (will await path results). 2) Diverticulosis, L>R, mild disease  3) Small internal hemorrhoids  4) No residual blood identified. No active bleeding. 5) TI intubated and colonoscope advanced approximately 15 cm, no sources identified. VITALS:  BP (!) 114/53   Pulse 124   Temp 99 °F (37.2 °C) (Oral)   Resp 29   Ht 4' 11\" (1.499 m)   Wt 139 lb 1.8 oz (63.1 kg)   LMP 2015   SpO2 95%   BMI 28.10 kg/m²   TEMPERATURE:  Current - Temp: 99 °F (37.2 °C);  Max - Temp  Av.4 °F (36.9 °C)  Min: 97.9 °F (36.6 °C)  Max: 99.5 °F (37.5 °C)    Physical Assessment:  General appearance:  Awake, cooperative  Mental Status:  Oriented x2  Lungs:  clear to auscultation bilaterally, normal effort  Heart:  regular rate and rhythm, no murmur  Abdomen:  soft, nontender, nondistended, normal bowel sounds, no masses, hepatomegaly, splenomegaly  Extremities:  no edema, redness, tenderness in the calves  Skin:  no gross lesions, rashes, induration    Data Review:  LABS and IMAGING:     CBC  Recent Labs      18   1507   18   0504  18   1006   18   1554  18   2233  11/10/18   0513  11/10/18   1403  18   0454   WBC   --    --   18.8*  15.9*   --    --    --   12.5*   --   9.5   HGB  8.2*   < >  6.0*  5.6*   < >

## 2018-11-11 NOTE — PROGRESS NOTES
27.0*  29.3*   MCV  97.8   --   89.0   --   91.0   MCH  30.9   --   30.8   --   30.1   MCHC  31.6   --   34.6   --   33.1   RDW  21.9*   --   19.1*   --   18.7*   PLT  268   --   See Reflexed IPF Result   --   See Reflexed IPF Result   MPV  9.6   --   NOT REPORTED   --   NOT REPORTED    < > = values in this interval not displayed.         Last 3 Blood Glucose:   Recent Labs      11/09/18   0504  11/10/18   0513  11/11/18   0454   GLUCOSE  113*  77  78        PT/INR:    Lab Results   Component Value Date    PROTIME 10.3 11/04/2018    INR 1.0 11/04/2018     PTT:    Lab Results   Component Value Date    APTT 19.3 11/04/2018       Comprehensive Metabolic Profile:   Recent Labs      11/09/18   0504  11/10/18   0513  11/11/18   0454   NA  139  138  137   K  3.1*  4.2  3.4*   CL  106  106  102   CO2  22  23  24   BUN  24*  9  4*   CREATININE  0.78  0.62  0.63   GLUCOSE  113*  77  78   CALCIUM  7.3*  7.5*  7.7*      Magnesium:   Lab Results   Component Value Date    MG 1.5 11/11/2018    MG 1.4 11/09/2018    MG 1.8 10/23/2018     Phosphorus: No results found for: PHOS  Ionized Calcium:   Lab Results   Component Value Date    CAION 1.17 11/11/2018    CAION 1.12 10/23/2018        Urinalysis:   Lab Results   Component Value Date    NITRU NEGATIVE 11/04/2018    COLORU YELLOW 11/04/2018    PHUR 7.0 11/04/2018    WBCUA None 11/04/2018    RBCUA 2 TO 5 11/04/2018    MUCUS NOT REPORTED 11/04/2018    TRICHOMONAS NOT REPORTED 11/04/2018    YEAST NOT REPORTED 11/04/2018    BACTERIA FEW 11/04/2018    SPECGRAV 1.031 11/04/2018    LEUKOCYTESUR NEGATIVE 11/04/2018    UROBILINOGEN Normal 11/04/2018    BILIRUBINUR NEGATIVE 11/04/2018    BILIRUBINUR 1+ 04/02/2012    GLUCOSEU NEGATIVE 11/04/2018    GLUCOSEU NEGATIVE 04/02/2012    KETUA NEGATIVE 11/04/2018    AMORPHOUS NOT REPORTED 11/04/2018       HgBA1c:    Lab Results   Component Value Date    LABA1C 5.5 08/11/2018     TSH:    Lab Results   Component Value Date    TSH 0.37 10/10/2018 inferior portion of the valve. No signs of active bleeding, suspect recent hemorrhage. No bleeding vessels identified. Findings could represent isolated ischemic colonic ischemic (IRCI) or NSAID induced colopathy (will await path results). Diverticulosis, L>R, mild disease  -Protonix PO BID  -advance diet as tolerated  -If H/H Q8H stable can check Hgb less frequently    Hypertension  -Coreg restarted at lower dose  -monitor BP     Acute Kidney Injury- Likely secondary to Anemia and Dehydration. (resolved)  -Creatinine 0.62 this morning.   -UA & urine culture negative      History of COPD  -No acute exacerbation currently.   -Continue Albuterol, Douneb as needed. Continue Dulera. -RT aerosol per protocol.      History of Chronic CHF  -BNP 83. Trop negative x 2.       Hepatitis C  -Hep C reactive 10/9/2017  -LFT normal   -CT abd negative 11/4/18  -US abd 10/24 showed no ascites.      Substance abuse  -Continue Suboxone     Renal  Ca, Mg, K replace PRN     Encephaolopathy  -Multifactorial, acute illness, not sleeping well, hx substance abuse, did not receive Suboxone for 2 days, restarted yesterday, Cx w/ NGTD, ammonia WNL. CT head unremarkable. -Zyprexa as needed at night  Overall, mentation improving     DVT ppx: Mechanical boots. GI ppx: Protonix BID    Baldo Otero MD, PGY-2  Internal Medicine  [address] [place] [address]  11/11/18  1:17 PM    Attending Physician Statement  I have discussed the care of Emi Daniels, including pertinent history and exam findings,  with the resident. I have seen and examined the patient and the key elements of all parts of the encounter have been performed by me. I agree with the assessment, plan and orders as documented by the resident with additions . Treatment plan Discussed with nursing staff in detail , all questions answered .    Electronically signed by Josiane Reece MD on   11/11/18 at 8:51 PM    Please note that this chart was generated

## 2018-11-11 NOTE — PROGRESS NOTES
as symptoms improve) 8/31/18   JAYDON Marvin CNP   acetaminophen (APAP EXTRA STRENGTH) 500 MG tablet Take 2 tablets by mouth every 8 hours as needed for Pain 8/12/18   Case Brown MD   lisinopril (PRINIVIL;ZESTRIL) 10 MG tablet Take 1 tablet by mouth daily 8/12/18   Case Brown MD   Multiple Vitamins-Minerals (THERAPEUTIC MULTIVITAMIN-MINERALS) tablet Take 1 tablet by mouth daily 8/12/18   Case Brown MD   .  Recent Surgical History: na     Assessment     Peak Flow (asthma only)    Predicted: na  Personal Best: na  PEF na  % Predicted na  Peak Flow : not applicable = 0    UML0/HRQ    FEV1 Predicted na      FEV1 na    FEV1 % Predicted na  FVC na  IS volume na  IBW na    RR 20  Breath Sounds: clear and diminished      · Bronchodilator assessment at level  3  · Hyperinflation assessment at level   · Secretion Management assessment at level    ·   · []    Bronchodilator Assessment  BRONCHODILATOR ASSESSMENT SCORE  Score 0 1 2 3 4 5   Breath Sounds   []  Patient Baseline []  No Wheeze good aeration []  Faint, scattered wheezing, good aeration [x]  Expiratory Wheezing and or moderately diminished []  Insp/Exp wheeze and/or very diminished []  Insp/Exp and/ or marked distress   Respiratory Rate   []  Patient Baseline []  Less than 20 []  Less than 20 [x]  20-25 []  Greater than 25 []  Greater than 25   Peak flow % of Pred or PB []  NA   []  Greater than 90%  []  81-90% []  71-80% []  Less than or equal to 70%  or unable to perform []  Unable due to Respiratory Distress   Dyspnea re []  Patient Baseline []  No SOB []  No SOB [x]  SOB on exertion []  SOB min activity []  At rest/acute   e FEV% Predicted       []  NA []  Above 69%  []  Unable []  Above 60-69%  []  Unable []  Above 50-59%  []  Unable []  Above 35-49%  []  Unable []  Less than 35%  []  Unable                 []  Hyperinflation Assessment  Score 1 2 3   CXR and Breath Sounds   []  Clear []  No atelectasis  Basilar aeration []  Atelectasis or absent basilar breath sounds   Incentive Spirometry Volume  (Per IBW)   []  Greater than or equal to 15ml/Kg []  less than 15ml/Kg []  less than 15ml/Kg   Surgery within last 2 weeks []  None or general   []  Abdominal or thoracic surgery  []  Abdominal or thoracic   Chronic Pulmonary Historyre []  No []  Yes []  Yes     []  Secretion Management Assessment  Score 1 2 3   Bilateral Breath Sounds   []  Occasional Rhonchi []  Scattered Rhonchi []  Course Rhonchi and/or poor aeration   Sputum    []  Small amount of thin secretions []  Moderate amount of viscous secretions []  Copius, Viscious Yellow/ Secretions   CXR as reported by physician []  clear  []  Unavailable []  Infiltrates and/or consolidation  []  Unavailable []  Mucus Plugging and or lobar consolidation  []  Unavailable   Cough []  Strong, productive cough []  Weak productive cough []  No cough or weak non-productive cough   GEETA STEWART  8:44 AM                            FEMALE                                  MALE                            FEV1 Predicted Normal Values                        FEV1 Predicted Normal Values          Age                                     Height in Feet and Inches       Age                                     Height in Feet and Inches       4' 11\" 5' 1\" 5' 3\" 5' 5\" 5' 7\" 5' 9\" 5' 11\" 6' 1\"  4' 11\" 5' 1\" 5' 3\" 5' 5\" 5' 7\" 5' 9\" 5' 11\" 6' 1\"   42 - 45 2.49 2.66 2.84 3.03 3.22 3.42 3.62 3.83 42 - 45 2.82 3.03 3.26 3.49 3.72 3.96 4.22 4.47   46 - 49 2.40 2.57 2.76 2.94 3.14 3.33 3.54 3.75 46 - 49 2.70 2.92 3.14 3.37 3.61 3.85 4.10 4.36   50 - 53 2.31 2.48 2.66 2.85 3.04 3.24 3.45 3.66 50 - 53 2.58 2.80 3.02 3.25 3.49 3.73 3.98 4.24   54 - 57 2.21 2.38 2.57 2.75 2.95 3.14 3.35 3.56 54 - 57 2.46 2.67 2.89 3.12 3.36 3.60 3.85 4.11   58 - 61 2.10 2.28 2.46 2.65 2.84 3.04 3.24 3.45 58 - 61 2.32 2.54 2.76 2.99 3.23 3.47 3.72 3.98   62 - 65 1.99 2.17 2.35 2.54 2.73 2.93 3.13 3.34 62 - 65 2.19 2.40 2.62 2.85 3.09 3.33 3.58 3.84   66 - 69 1.88

## 2018-11-12 PROBLEM — D50.0 ANEMIA DUE TO GASTROINTESTINAL BLOOD LOSS: Status: ACTIVE | Noted: 2018-11-12

## 2018-11-12 PROBLEM — K26.4 DUODENAL ULCER WITH HEMORRHAGE: Status: ACTIVE | Noted: 2018-11-12

## 2018-11-12 PROBLEM — K92.2 ACUTE GI HEMORRHAGE: Status: ACTIVE | Noted: 2018-11-12

## 2018-11-12 PROBLEM — F29 PSYCHOSIS (HCC): Status: ACTIVE | Noted: 2018-11-12

## 2018-11-12 PROBLEM — K27.9 PUD (PEPTIC ULCER DISEASE): Status: ACTIVE | Noted: 2018-11-12

## 2018-11-12 LAB
ABSOLUTE EOS #: 0.61 K/UL (ref 0–0.44)
ABSOLUTE IMMATURE GRANULOCYTE: 0.12 K/UL (ref 0–0.3)
ABSOLUTE LYMPH #: 1.53 K/UL (ref 1.1–3.7)
ABSOLUTE MONO #: 1.16 K/UL (ref 0.1–1.2)
ANION GAP SERPL CALCULATED.3IONS-SCNC: 9 MMOL/L (ref 9–17)
BASOPHILS # BLD: 0 % (ref 0–2)
BASOPHILS ABSOLUTE: 0.03 K/UL (ref 0–0.2)
BUN BLDV-MCNC: 3 MG/DL (ref 8–23)
BUN/CREAT BLD: ABNORMAL (ref 9–20)
CALCIUM SERPL-MCNC: 7.8 MG/DL (ref 8.6–10.4)
CHLORIDE BLD-SCNC: 107 MMOL/L (ref 98–107)
CO2: 24 MMOL/L (ref 20–31)
CREAT SERPL-MCNC: 0.59 MG/DL (ref 0.5–0.9)
CULTURE: NORMAL
DIFFERENTIAL TYPE: ABNORMAL
EOSINOPHILS RELATIVE PERCENT: 4 % (ref 1–4)
GFR AFRICAN AMERICAN: >60 ML/MIN
GFR NON-AFRICAN AMERICAN: >60 ML/MIN
GFR SERPL CREATININE-BSD FRML MDRD: ABNORMAL ML/MIN/{1.73_M2}
GFR SERPL CREATININE-BSD FRML MDRD: ABNORMAL ML/MIN/{1.73_M2}
GLUCOSE BLD-MCNC: 100 MG/DL (ref 70–99)
HCT VFR BLD CALC: 26.7 % (ref 36.3–47.1)
HEMOGLOBIN: 8.4 G/DL (ref 11.9–15.1)
IMMATURE GRANULOCYTES: 1 %
LYMPHOCYTES # BLD: 10 % (ref 24–43)
Lab: NORMAL
MCH RBC QN AUTO: 29.9 PG (ref 25.2–33.5)
MCHC RBC AUTO-ENTMCNC: 31.5 G/DL (ref 28.4–34.8)
MCV RBC AUTO: 95 FL (ref 82.6–102.9)
MONOCYTES # BLD: 8 % (ref 3–12)
NRBC AUTOMATED: 0.1 PER 100 WBC
PDW BLD-RTO: 18.4 % (ref 11.8–14.4)
PLATELET # BLD: 351 K/UL (ref 138–453)
PLATELET ESTIMATE: ABNORMAL
PMV BLD AUTO: 9.1 FL (ref 8.1–13.5)
POTASSIUM SERPL-SCNC: 3.7 MMOL/L (ref 3.7–5.3)
RBC # BLD: 2.81 M/UL (ref 3.95–5.11)
RBC # BLD: ABNORMAL 10*6/UL
SEG NEUTROPHILS: 77 % (ref 36–65)
SEGMENTED NEUTROPHILS ABSOLUTE COUNT: 11.81 K/UL (ref 1.5–8.1)
SODIUM BLD-SCNC: 140 MMOL/L (ref 135–144)
SPECIMEN DESCRIPTION: NORMAL
STATUS: NORMAL
WBC # BLD: 15.3 K/UL (ref 3.5–11.3)
WBC # BLD: ABNORMAL 10*3/UL

## 2018-11-12 PROCEDURE — 94762 N-INVAS EAR/PLS OXIMTRY CONT: CPT

## 2018-11-12 PROCEDURE — 90792 PSYCH DIAG EVAL W/MED SRVCS: CPT | Performed by: NURSE PRACTITIONER

## 2018-11-12 PROCEDURE — 6370000000 HC RX 637 (ALT 250 FOR IP): Performed by: STUDENT IN AN ORGANIZED HEALTH CARE EDUCATION/TRAINING PROGRAM

## 2018-11-12 PROCEDURE — 85025 COMPLETE CBC W/AUTO DIFF WBC: CPT

## 2018-11-12 PROCEDURE — 2060000000 HC ICU INTERMEDIATE R&B

## 2018-11-12 PROCEDURE — 6370000000 HC RX 637 (ALT 250 FOR IP): Performed by: INTERNAL MEDICINE

## 2018-11-12 PROCEDURE — 97110 THERAPEUTIC EXERCISES: CPT

## 2018-11-12 PROCEDURE — 6360000002 HC RX W HCPCS: Performed by: STUDENT IN AN ORGANIZED HEALTH CARE EDUCATION/TRAINING PROGRAM

## 2018-11-12 PROCEDURE — 6360000002 HC RX W HCPCS: Performed by: EMERGENCY MEDICINE

## 2018-11-12 PROCEDURE — 36415 COLL VENOUS BLD VENIPUNCTURE: CPT

## 2018-11-12 PROCEDURE — 97530 THERAPEUTIC ACTIVITIES: CPT

## 2018-11-12 PROCEDURE — 2580000003 HC RX 258: Performed by: EMERGENCY MEDICINE

## 2018-11-12 PROCEDURE — 2700000000 HC OXYGEN THERAPY PER DAY

## 2018-11-12 PROCEDURE — 99233 SBSQ HOSP IP/OBS HIGH 50: CPT | Performed by: INTERNAL MEDICINE

## 2018-11-12 PROCEDURE — 94640 AIRWAY INHALATION TREATMENT: CPT

## 2018-11-12 PROCEDURE — 80048 BASIC METABOLIC PNL TOTAL CA: CPT

## 2018-11-12 PROCEDURE — 2580000003 HC RX 258: Performed by: STUDENT IN AN ORGANIZED HEALTH CARE EDUCATION/TRAINING PROGRAM

## 2018-11-12 PROCEDURE — 97116 GAIT TRAINING THERAPY: CPT

## 2018-11-12 RX ORDER — CARVEDILOL 3.12 MG/1
3.12 TABLET ORAL 2 TIMES DAILY WITH MEALS
Status: DISCONTINUED | OUTPATIENT
Start: 2018-11-12 | End: 2018-11-13 | Stop reason: HOSPADM

## 2018-11-12 RX ORDER — GUAIFENESIN 600 MG/1
600 TABLET, EXTENDED RELEASE ORAL ONCE
Status: COMPLETED | OUTPATIENT
Start: 2018-11-12 | End: 2018-11-12

## 2018-11-12 RX ORDER — ACETAMINOPHEN 325 MG/1
650 TABLET ORAL EVERY 4 HOURS PRN
Status: DISCONTINUED | OUTPATIENT
Start: 2018-11-12 | End: 2018-11-13 | Stop reason: HOSPADM

## 2018-11-12 RX ORDER — MAGNESIUM SULFATE 1 G/100ML
1 INJECTION INTRAVENOUS
Status: COMPLETED | OUTPATIENT
Start: 2018-11-12 | End: 2018-11-12

## 2018-11-12 RX ADMIN — GABAPENTIN 200 MG: 100 CAPSULE ORAL at 21:09

## 2018-11-12 RX ADMIN — IPRATROPIUM BROMIDE AND ALBUTEROL SULFATE 1 AMPULE: .5; 3 SOLUTION RESPIRATORY (INHALATION) at 07:49

## 2018-11-12 RX ADMIN — BUPRENORPHINE AND NALOXONE 6 TABLET: 2; .5 TABLET SUBLINGUAL at 11:23

## 2018-11-12 RX ADMIN — IPRATROPIUM BROMIDE AND ALBUTEROL SULFATE 1 AMPULE: .5; 3 SOLUTION RESPIRATORY (INHALATION) at 20:20

## 2018-11-12 RX ADMIN — IPRATROPIUM BROMIDE AND ALBUTEROL SULFATE 1 AMPULE: .5; 3 SOLUTION RESPIRATORY (INHALATION) at 16:18

## 2018-11-12 RX ADMIN — MAGNESIUM SULFATE HEPTAHYDRATE 1 G: 1 INJECTION, SOLUTION INTRAVENOUS at 12:45

## 2018-11-12 RX ADMIN — IPRATROPIUM BROMIDE AND ALBUTEROL SULFATE 1 AMPULE: .5; 3 SOLUTION RESPIRATORY (INHALATION) at 12:34

## 2018-11-12 RX ADMIN — MAGNESIUM SULFATE HEPTAHYDRATE 1 G: 1 INJECTION, SOLUTION INTRAVENOUS at 11:12

## 2018-11-12 RX ADMIN — GUAIFENESIN 600 MG: 600 TABLET, EXTENDED RELEASE ORAL at 22:35

## 2018-11-12 RX ADMIN — CARVEDILOL 3.12 MG: 3.12 TABLET, FILM COATED ORAL at 16:39

## 2018-11-12 RX ADMIN — PANTOPRAZOLE SODIUM 40 MG: 40 TABLET, DELAYED RELEASE ORAL at 06:09

## 2018-11-12 RX ADMIN — CARVEDILOL 3.12 MG: 3.12 TABLET, FILM COATED ORAL at 09:09

## 2018-11-12 RX ADMIN — Medication 10 ML: at 09:09

## 2018-11-12 RX ADMIN — PANTOPRAZOLE SODIUM 40 MG: 40 TABLET, DELAYED RELEASE ORAL at 16:43

## 2018-11-12 RX ADMIN — Medication 10 ML: at 09:08

## 2018-11-12 RX ADMIN — GABAPENTIN 200 MG: 100 CAPSULE ORAL at 14:06

## 2018-11-12 RX ADMIN — Medication 10 ML: at 21:09

## 2018-11-12 RX ADMIN — GABAPENTIN 200 MG: 100 CAPSULE ORAL at 09:09

## 2018-11-12 ASSESSMENT — PAIN SCALES - GENERAL
PAINLEVEL_OUTOF10: 0
PAINLEVEL_OUTOF10: 0
PAINLEVEL_OUTOF10: 8
PAINLEVEL_OUTOF10: 3
PAINLEVEL_OUTOF10: 2
PAINLEVEL_OUTOF10: 0

## 2018-11-12 NOTE — PROGRESS NOTES
Physical Therapy  Facility/Department: [facility] 4A STEPDOWN  Daily Treatment Note  NAME: Emi Daniels  : 1955  MRN: [id number]    Date of Service: 2018    Discharge Recommendations:  [address]        Patient Diagnosis(es): The encounter diagnosis was Upper GI bleed. has a past medical history of Appendicitis; CHF (congestive heart failure) ([address] Chronic rhinitis; Cigarette smoker; COPD (chronic obstructive pulmonary disease) ([address] Depression; Dysphagia; Essential hypertension; GERD (gastroesophageal reflux disease); Heart failure, diastolic, with acute decompensation ([address] Hepatitis C, chronic ([facility] Hyperlipidemia; Hypertension; Migraine; Moderate COPD (chronic obstructive pulmonary disease) ([address] Multiple transfusions; Osteoarthritis; Pneumonia; Scoliosis; and Substance abuse ([facility] has a past surgical history that includes  section; LEEP (); Upper gastrointestinal endoscopy (); Gastric bypass surgery; Appendectomy (2017); laparoscopic appendectomy (N/A, 3/27/2017); Upper gastrointestinal endoscopy (2018); Upper gastrointestinal endoscopy (N/A, 2018); pr colonoscopy w/biopsy single/multiple (N/A, 11/10/2018); and pr egd transoral biopsy single/multiple (N/A, 2018). Restrictions  Restrictions/Precautions  Restrictions/Precautions: Fall Risk  Required Braces or Orthoses?: No  Position Activity Restriction  Other position/activity restrictions: up with assist  Subjective   General  Chart Reviewed: Yes  Response To Previous Treatment: Patient with no complaints from previous session. Family / Caregiver Present: Yes (DIL)  Subjective  Subjective: Per RN, patient okay for PT. Patient agreeable to gait train in hallway. Feeling better than previous days.            Orientation  Orientation  Overall Orientation Status: Within Normal Limits  Orientation Level: Oriented X4  Cognition      Objective   Bed mobility  Supine to Sit: Minimal

## 2018-11-12 NOTE — PROGRESS NOTES
THE [facility] AT [facility] Progress Note    Emi Ruiz is a 70 y.o. female patient. Hospitalization Day:8      Chief consult reason:   GIB-melena, abdominal pain, h/o Hep C   Subjective:  Pt seen and examined. Pt sitting in bed asking for regular diet, talking with staff and family. No s/s of active bleeding overnight. VSS Hgb did drop from 9.7 to 8.4. No bm overnight  Awaiting path results from colonoscopy bx    VITALS:  BP (!) 123/56   Pulse 91   Temp 98.6 °F (37 °C) (Oral)   Resp 16   Ht 4' 11\" (1.499 m)   Wt 145 lb 15.1 oz (66.2 kg) Comment: removed bedding down to 1 pillow and 1 blanket  LMP 2015   SpO2 99%   BMI 29.48 kg/m²   TEMPERATURE:  Current - Temp: 98.6 °F (37 °C); Max - Temp  Av.7 °F (37.1 °C)  Min: 98.6 °F (37 °C)  Max: 98.7 °F (37.1 °C)    Physical Assessment:  General appearance:  Awake, cooperative  Mental Status:  Oriented x3  Lungs:  clear to auscultation bilaterally, normal effort  Heart:  regular rate and rhythm, no murmur  Abdomen:  soft, nontender, nondistended, normal bowel sounds, no masses, hepatomegaly, splenomegaly  Extremities:  no edema, redness, tenderness in the calves  Skin:  no gross lesions, rashes, induration    Data Review:  LABS and IMAGING:     CBC  Recent Labs      18   2233  11/10/18   0513  11/10/18   1403  18   0454  18   0644   WBC   --   12.5*   --   9.5  15.3*   HGB  10.2*  9.8*  9.2*  9.7*  8.4*   MCV   --   89.0   --   91.0  95.0   RDW   --   19.1*   --   18.7*  18.4*   PLT   --   See Reflexed IPF Result   --   See Reflexed IPF Result  351       ANEMIA STUDIES  No results for input(s): LABIRON, TIBC, FERRITIN, [id number], FOLATE, OCCULTBLD in the last 72 hours.     BMP  Recent Labs      11/10/18   0513  18   0454  18   0644   NA  138  137  140   K  4.2  3.4*  3.7   CL  106  102  107   CO2  23  24  24   BUN  9  4*  3*   CREATININE  0.62  0.63  0.59   GLUCOSE  77  78  100*   CALCIUM  7.5*  7.7*  7.8*       LFTS  No results for input(s): ALKPHOS, ALT, AST, BILITOT, BILIDIR, LABALBU in the last 72 hours. AMYLASE/LIPASE/AMMONIA  No results for input(s): AMYLASE, LIPASE, AMMONIA in the last 72 hours. PT/INR  No results for input(s): PROTIME, INR in the last 72 hours. CEA:  No results for input(s): CEA in the last 72 hours. Ca 125:  No results for input(s):  in the last 72 hours. Ca 19-9:   Invalid input(s):   AFP: No results for input(s): AFP in the last 72 hours. Lactic acid:Invalid input(s): LACTIC ACID   Radiology Review:        ASSESSMENT and Plan:  1. Duodenal ulcer with fresh bleeding and pulsating visible vessel treated with Epi and endoclips s/sp EGD 11/5/2018 per Dr. John Ann.   -Protonix 40 mg po daily  -Monitor and trend hemoglobin q6 hours-transfuse to maintain level >7  -No bx taken    2. Linear ulcerations at the IC valve identified per colonoscopy 11/10/2018. POC to follow etiology identified through bx.   -no active bleeding noted  -F/U bx taken-still pending    2. H/O Alcoholism-last drink stated was 5 yrs ago but pt has AMS? ?? Etiology unclear at this time.  -Rec CIWA protocol  -Rec MV, folate, thiamine    3. H/O Hep C-unsure if pt has received treatment  -Pt to f/u with previous GI specialist at [facility]    Thank you for allowing me to participate in the care of your patient. Please feel free to contact me with any questions or concerns.      Terell Cortez, [address] [facility]  [phone]

## 2018-11-12 NOTE — PROGRESS NOTES
disease) ([facility]    Alcoholic cirrhosis of liver without ascites ([facility]         Plan:  GI bleed 2/2 duodenal ulcer  · Bloody BM, large hgb drop, hypotensive, EGD showed bleeds, clipped with epi injection, hemostasis achieved, second EGD showed no more active bleeding, colonoscopy done also showed no bleeds  · protonix PO BID   · 6U PRBC transfused, monitor hgb daily currently 8.4    Hepatitis C: reactive 10/9/2017, LFT normal, CT abd -ve 11/4/2018, abd u/s 10/24/018 show no ascites   Substance abuse: on suboxone  COPD: continue albuterol, duoneb, dulerla, RT aerosol protocol  HTN:on coreg 3.125, BP holding well  CRYSTAL: resolved  Chronic CHF: diastolic, BNP 83, recent 29/87/1549 EF >75%    Will observe for one more day per GI and d/c tmrw if hgb stable. Hadley Spaulding MD             11/12/2018, 8:25 AM     Attending Physician Statement  I have discussed the care of Emi Daniels, including pertinent history and exam findings with the resident. I have reviewed the key elements of all parts of the encounter with the resident. I have seen and examined the patient with the resident and the key elements of all parts of the encounter have been performed by me. 70year old lady who was initially admitted to the floor for GI bleed but was later transferred to the ICU because she was hemodynamically unstable  PMH, vitals, labs, medications reviewed  Patient underwent EGD that showed active bleeding duodenal ulcer with a visible vessel is injected with epinephrine and clips were placed. Repeat EGD did not show active bleeding. Patient received multiple units of PRBC. Hemoglobin is now stable. She is on PPI. Also had altered mental status which is much improved this morning. Blood pressure is now stable, she only received Coreg. We will monitor for today.   Possible discharge tomorrow

## 2018-11-13 VITALS
OXYGEN SATURATION: 100 % | TEMPERATURE: 97.7 F | DIASTOLIC BLOOD PRESSURE: 62 MMHG | WEIGHT: 154.1 LBS | HEART RATE: 82 BPM | BODY MASS INDEX: 31.07 KG/M2 | SYSTOLIC BLOOD PRESSURE: 126 MMHG | HEIGHT: 59 IN | RESPIRATION RATE: 13 BRPM

## 2018-11-13 LAB
ABO/RH: NORMAL
ABSOLUTE EOS #: 0.62 K/UL (ref 0–0.44)
ABSOLUTE IMMATURE GRANULOCYTE: 0.28 K/UL (ref 0–0.3)
ABSOLUTE LYMPH #: 2.11 K/UL (ref 1.1–3.7)
ABSOLUTE MONO #: 1.08 K/UL (ref 0.1–1.2)
ANION GAP SERPL CALCULATED.3IONS-SCNC: 12 MMOL/L (ref 9–17)
BASOPHILS # BLD: 0 % (ref 0–2)
BASOPHILS ABSOLUTE: 0.05 K/UL (ref 0–0.2)
BUN BLDV-MCNC: 4 MG/DL (ref 8–23)
BUN/CREAT BLD: ABNORMAL (ref 9–20)
CALCIUM SERPL-MCNC: 7.7 MG/DL (ref 8.6–10.4)
CHLORIDE BLD-SCNC: 114 MMOL/L (ref 98–107)
CO2: 20 MMOL/L (ref 20–31)
CREAT SERPL-MCNC: 0.61 MG/DL (ref 0.5–0.9)
DAT, POLYSPECIFIC: NEGATIVE
DIFFERENTIAL TYPE: ABNORMAL
EOSINOPHILS RELATIVE PERCENT: 4 % (ref 1–4)
GFR AFRICAN AMERICAN: >60 ML/MIN
GFR NON-AFRICAN AMERICAN: >60 ML/MIN
GFR SERPL CREATININE-BSD FRML MDRD: ABNORMAL ML/MIN/{1.73_M2}
GFR SERPL CREATININE-BSD FRML MDRD: ABNORMAL ML/MIN/{1.73_M2}
GLUCOSE BLD-MCNC: 94 MG/DL (ref 70–99)
HCT VFR BLD CALC: 26.4 % (ref 36.3–47.1)
HEMOGLOBIN: 8.3 G/DL (ref 11.9–15.1)
IMMATURE GRANULOCYTES: 2 %
LYMPHOCYTES # BLD: 15 % (ref 24–43)
MCH RBC QN AUTO: 29.2 PG (ref 25.2–33.5)
MCHC RBC AUTO-ENTMCNC: 31.4 G/DL (ref 28.4–34.8)
MCV RBC AUTO: 93 FL (ref 82.6–102.9)
MONOCYTES # BLD: 8 % (ref 3–12)
NRBC AUTOMATED: 0.1 PER 100 WBC
PATHOLOGIST: NORMAL
PDW BLD-RTO: 17.3 % (ref 11.8–14.4)
PLATELET # BLD: ABNORMAL K/UL (ref 138–453)
PLATELET ESTIMATE: ABNORMAL
PLATELET, FLUORESCENCE: NORMAL K/UL (ref 138–453)
PLATELET, IMMATURE FRACTION: NORMAL % (ref 1.1–10.3)
PMV BLD AUTO: ABNORMAL FL (ref 8.1–13.5)
POTASSIUM SERPL-SCNC: 4.2 MMOL/L (ref 3.7–5.3)
RBC # BLD: 2.84 M/UL (ref 3.95–5.11)
RBC # BLD: ABNORMAL 10*6/UL
SEG NEUTROPHILS: 71 % (ref 36–65)
SEGMENTED NEUTROPHILS ABSOLUTE COUNT: 10.14 K/UL (ref 1.5–8.1)
SEROLOGICAL REPORT: NORMAL
SODIUM BLD-SCNC: 146 MMOL/L (ref 135–144)
SURGICAL PATHOLOGY REPORT: NORMAL
TRANSFUSION REACTION REPORT: NORMAL
WBC # BLD: 14.3 K/UL (ref 3.5–11.3)
WBC # BLD: ABNORMAL 10*3/UL

## 2018-11-13 PROCEDURE — 2580000003 HC RX 258: Performed by: EMERGENCY MEDICINE

## 2018-11-13 PROCEDURE — 6360000002 HC RX W HCPCS: Performed by: EMERGENCY MEDICINE

## 2018-11-13 PROCEDURE — 6370000000 HC RX 637 (ALT 250 FOR IP): Performed by: STUDENT IN AN ORGANIZED HEALTH CARE EDUCATION/TRAINING PROGRAM

## 2018-11-13 PROCEDURE — 97530 THERAPEUTIC ACTIVITIES: CPT

## 2018-11-13 PROCEDURE — 94640 AIRWAY INHALATION TREATMENT: CPT

## 2018-11-13 PROCEDURE — 6370000000 HC RX 637 (ALT 250 FOR IP): Performed by: INTERNAL MEDICINE

## 2018-11-13 PROCEDURE — 97110 THERAPEUTIC EXERCISES: CPT

## 2018-11-13 PROCEDURE — 2700000000 HC OXYGEN THERAPY PER DAY

## 2018-11-13 PROCEDURE — 85055 RETICULATED PLATELET ASSAY: CPT

## 2018-11-13 PROCEDURE — 85025 COMPLETE CBC W/AUTO DIFF WBC: CPT

## 2018-11-13 PROCEDURE — 80048 BASIC METABOLIC PNL TOTAL CA: CPT

## 2018-11-13 PROCEDURE — 6360000002 HC RX W HCPCS: Performed by: INTERNAL MEDICINE

## 2018-11-13 PROCEDURE — 36415 COLL VENOUS BLD VENIPUNCTURE: CPT

## 2018-11-13 PROCEDURE — 94762 N-INVAS EAR/PLS OXIMTRY CONT: CPT

## 2018-11-13 PROCEDURE — 99239 HOSP IP/OBS DSCHRG MGMT >30: CPT | Performed by: INTERNAL MEDICINE

## 2018-11-13 PROCEDURE — 99233 SBSQ HOSP IP/OBS HIGH 50: CPT | Performed by: INTERNAL MEDICINE

## 2018-11-13 RX ORDER — PANTOPRAZOLE SODIUM 40 MG/1
40 TABLET, DELAYED RELEASE ORAL
Qty: 30 TABLET | Refills: 3 | Status: ON HOLD | OUTPATIENT
Start: 2018-11-13 | End: 2021-04-06 | Stop reason: HOSPADM

## 2018-11-13 RX ORDER — CARVEDILOL 3.12 MG/1
3.12 TABLET ORAL 2 TIMES DAILY WITH MEALS
Qty: 60 TABLET | Refills: 3 | Status: ON HOLD | OUTPATIENT
Start: 2018-11-13 | End: 2019-02-07 | Stop reason: HOSPADM

## 2018-11-13 RX ADMIN — GABAPENTIN 200 MG: 100 CAPSULE ORAL at 15:10

## 2018-11-13 RX ADMIN — GABAPENTIN 200 MG: 100 CAPSULE ORAL at 08:16

## 2018-11-13 RX ADMIN — BUPRENORPHINE AND NALOXONE 6 TABLET: 2; .5 TABLET SUBLINGUAL at 09:25

## 2018-11-13 RX ADMIN — CARVEDILOL 3.12 MG: 3.12 TABLET, FILM COATED ORAL at 08:16

## 2018-11-13 RX ADMIN — PANTOPRAZOLE SODIUM 40 MG: 40 TABLET, DELAYED RELEASE ORAL at 06:38

## 2018-11-13 RX ADMIN — ALBUTEROL SULFATE 2.5 MG: 2.5 SOLUTION RESPIRATORY (INHALATION) at 01:12

## 2018-11-13 RX ADMIN — ACETAMINOPHEN 650 MG: 325 TABLET ORAL at 03:18

## 2018-11-13 RX ADMIN — IPRATROPIUM BROMIDE AND ALBUTEROL SULFATE 1 AMPULE: .5; 3 SOLUTION RESPIRATORY (INHALATION) at 12:07

## 2018-11-13 RX ADMIN — SODIUM CHLORIDE: 4.5 INJECTION, SOLUTION INTRAVENOUS at 00:14

## 2018-11-13 RX ADMIN — IPRATROPIUM BROMIDE AND ALBUTEROL SULFATE 1 AMPULE: .5; 3 SOLUTION RESPIRATORY (INHALATION) at 08:31

## 2018-11-13 ASSESSMENT — PAIN SCALES - GENERAL
PAINLEVEL_OUTOF10: 3

## 2018-11-13 ASSESSMENT — PAIN DESCRIPTION - LOCATION: LOCATION: HEAD

## 2018-11-13 ASSESSMENT — PAIN DESCRIPTION - DESCRIPTORS: DESCRIPTORS: ACHING

## 2018-11-13 ASSESSMENT — PAIN DESCRIPTION - PAIN TYPE: TYPE: ACUTE PAIN

## 2018-11-13 NOTE — PROGRESS NOTES
ADDENDUM:    acute leukocytosis not fully explained, would recommend medical evaluation for this prior to considering disposition.

## 2018-11-13 NOTE — PLAN OF CARE
Problem: Falls - Risk of:  Goal: Absence of physical injury  Absence of physical injury   Outcome: Ongoing      Problem: Pain:  Goal: Pain level will decrease  Pain level will decrease   Outcome: Ongoing    Goal: Control of acute pain  Control of acute pain   Outcome: Ongoing
Problem: Falls - Risk of:  Goal: Will remain free from falls  Will remain free from falls   Outcome: Ongoing    Goal: Absence of physical injury  Absence of physical injury   Outcome: Ongoing      Problem: Risk for Impaired Skin Integrity  Goal: Tissue integrity - skin and mucous membranes  Structural intactness and normal physiological function of skin and  mucous membranes.    Outcome: Ongoing      Problem: Pain:  Goal: Control of acute pain  Control of acute pain   Outcome: Ongoing
Problem: Falls - Risk of:  Goal: Will remain free from falls  Will remain free from falls   Outcome: Ongoing  Patient has call light within reach and knows she needs assistance getting up
Problem: Respiratory  Intervention: Respiratory assessment  BRONCHOSPASM/BRONCHOCONSTRICTION     [x]         IMPROVE AERATION/BREATH SOUNDS  [x]   ADMINISTER BRONCHODILATOR THERAPY AS APPROPRIATE  [x]   ASSESS BREATH SOUNDS  []   IMPLEMENT AEROSOL/MDI PROTOCOL  [x]   PATIENT EDUCATION AS NEEDED
Problem: Respiratory  Intervention: Respiratory assessment  BRONCHOSPASM/BRONCHOCONSTRICTION     [x]         IMPROVE AERATION/BREATH SOUNDS  [x]   ADMINISTER BRONCHODILATOR THERAPY AS APPROPRIATE  [x]   ASSESS BREATH SOUNDS  [x]   IMPLEMENT AEROSOL/MDI PROTOCOL  [x]   PATIENT EDUCATION AS NEEDED
Problem: Respiratory  Intervention: Respiratory assessment  BRONCHOSPASM/BRONCHOCONSTRICTION     [x]         IMPROVE AERATION/BREATH SOUNDS  [x]   ADMINISTER BRONCHODILATOR THERAPY AS APPROPRIATE  [x]   ASSESS BREATH SOUNDS  [x]   IMPLEMENT AEROSOL/MDI PROTOCOL  [x]   PATIENT EDUCATION AS NEEDED    PROVIDE ADEQUATE OXYGENATION WITH ACCEPTABLE SP02/ABG'S    [x]  IDENTIFY APPROPRIATE OXYGEN THERAPY  [x]   MONITOR SP02/ABG'S AS NEEDED   [x]   PATIENT EDUCATION AS NEEDED
Problem: Respiratory  Intervention: Respiratory assessment  BRONCHOSPASM/BRONCHOCONSTRICTION     [x]         IMPROVE AERATION/BREATH SOUNDS  [x]   ADMINISTER BRONCHODILATOR THERAPY AS APPROPRIATE  [x]   ASSESS BREATH SOUNDS  [x]   IMPLEMENT AEROSOL/MDI PROTOCOL  [x]   PATIENT EDUCATION AS NEEDED    emma pressley, [facility]. Upper GI bleed [K92.2] . Vitals:    11/07/18 1620   BP:    Pulse: 98   Resp: 14   Temp: 99.5 °F (37.5 °C)   SpO2: 99%   . Patients home meds are   Prior to Admission medications    Medication Sig Start Date End Date Taking? Authorizing Provider   ipratropium-albuterol (DUONEB) 0.5-2.5 (3) MG/3ML SOLN nebulizer solution Inhale 3 mLs into the lungs every 4 hours 10/25/18   Joshua Gloria MD   mometasone-formoterol (DULERA) 100-5 MCG/ACT inhaler Inhale 2 puffs into the lungs 2 times daily 10/25/18   Joshua Gloria MD   predniSONE (DELTASONE) 10 MG tablet Take 1 tablet by mouth daily for 3 doses 11/4/18 11/7/18  Joshua Gloria MD   carvedilol (COREG) 12.5 MG tablet Take 1 tablet by mouth 2 times daily (with meals) 10/25/18   Joshua Gloria MD   folic acid (FOLVITE) 1 MG tablet Take 1 tablet by mouth daily 10/13/18   Joshua Gloria MD   furosemide (LASIX) 40 MG tablet Take 1 tablet by mouth daily 10/12/18   Joshua Gloria MD   vitamin B-1 100 MG tablet Take 1 tablet by mouth daily 10/13/18   Joshua Gloria MD   vitamin B-12 250 MCG tablet Take 1 tablet by mouth daily 10/13/18   Joshua Gloria MD   docusate sodium (COLACE) 100 MG capsule Take 1 capsule by mouth 2 times daily 10/12/18   Joshua Gloria MD   gabapentin (NEURONTIN) 300 MG capsule Take 300 mg by mouth 3 times daily. Ninfa Coombs Historical Provider, MD   buprenorphine-naloxone (SUBOXONE) 12-3 MG sublingual film Place 1 Film under the tongue daily. Ninfa Coombs     Historical Provider, MD   pantoprazole (PROTONIX) 40 MG tablet Take 1 tablet by mouth every morning (before breakfast) 9/14/18   Mireille Masden
35%  []  Unable                 []  Hyperinflation Assessment  Score 1 2 3   CXR and Breath Sounds   []  Clear []  No atelectasis  Basilar aeration []  Atelectasis or absent basilar breath sounds   Incentive Spirometry Volume  (Per IBW)   []  Greater than or equal to 15ml/Kg []  less than 15ml/Kg []  less than 15ml/Kg   Surgery within last 2 weeks []  None or general   []  Abdominal or thoracic surgery  []  Abdominal or thoracic   Chronic Pulmonary Historyre []  No []  Yes []  Yes     []  Secretion Management Assessment  Score 1 2 3   Bilateral Breath Sounds   []  Occasional Rhonchi []  Scattered Rhonchi []  Course Rhonchi and/or poor aeration   Sputum    []  Small amount of thin secretions []  Moderate amount of viscous secretions []  Copius, Viscious Yellow/ Secretions   CXR as reported by physician []  clear  []  Unavailable []  Infiltrates and/or consolidation  []  Unavailable []  Mucus Plugging and or lobar consolidation  []  Unavailable   Cough []  Strong, productive cough []  Weak productive cough []  No cough or weak non-productive cough   Jodi Candelario  9:26 AM                            FEMALE                                  MALE                            FEV1 Predicted Normal Values                        FEV1 Predicted Normal Values          Age                                     Height in Feet and Inches       Age                                     Height in Feet and Inches       4' 11\" 5' 1\" 5' 3\" 5' 5\" 5' 7\" 5' 9\" 5' 11\" 6' 1\"  4' 11\" 5' 1\" 5' 3\" 5' 5\" 5' 7\" 5' 9\" 5' 11\" 6' 1\"   42 - 45 2.49 2.66 2.84 3.03 3.22 3.42 3.62 3.83 42 - 45 2.82 3.03 3.26 3.49 3.72 3.96 4.22 4.47   46 - 49 2.40 2.57 2.76 2.94 3.14 3.33 3.54 3.75 46 - 49 2.70 2.92 3.14 3.37 3.61 3.85 4.10 4.36   50 - 53 2.31 2.48 2.66 2.85 3.04 3.24 3.45 3.66 50 - 53 2.58 2.80 3.02 3.25 3.49 3.73 3.98 4.24   54 - 57 2.21 2.38 2.57 2.75 2.95 3.14 3.35 3.56 54 - 57 2.46 2.67 2.89 3.12 3.36 3.60 3.85 4.11   58 - 61 2.10 2.28 2.46 2.65

## 2018-11-13 NOTE — PROGRESS NOTES
Inpatient    Scheduled Meds:   carvedilol  3.125 mg Oral BID WC    pantoprazole  40 mg Oral BID AC    ipratropium-albuterol  1 ampule Inhalation 4x daily    gabapentin  200 mg Oral TID    buprenorphine-naloxone  6 tablet Sublingual Daily    sodium chloride flush  10 mL Intravenous 2 times per day    sodium chloride flush  10 mL Intravenous 2 times per day     Continuous Infusions:   sodium chloride 50 mL/hr at 11/13/18 0014     PRN Meds:acetaminophen, metoprolol, OLANZapine **AND** sterile water, sodium chloride flush, magnesium hydroxide, ondansetron, potassium chloride **OR** potassium chloride **OR** potassium chloride, nicotine, albuterol    Objective:    Physical Exam:  Vitals: BP (!) 139/57   Pulse 98   Temp 99.6 °F (37.6 °C) (Temporal)   Resp 17   Ht 4' 11\" (1.499 m)   Wt 154 lb 1.6 oz (69.9 kg)   LMP 03/18/2015   SpO2 99%   BMI 31.12 kg/m²   24 hour intake/output:  Intake/Output Summary (Last 24 hours) at 11/13/18 0811  Last data filed at 11/13/18 0607   Gross per 24 hour   Intake          3015.21 ml   Output             1050 ml   Net          1965.21 ml     Last 3 weights: Wt Readings from Last 3 Encounters:   11/13/18 154 lb 1.6 oz (69.9 kg)   10/24/18 140 lb 14.4 oz (63.9 kg)   10/11/18 153 lb 14.1 oz (69.8 kg)       Physical Examination:   General appearance: alert and cooperative with exam  HEENT: Head: Normocephalic, no lesions, without obvious abnormality.   Neck: no adenopathy, no carotid bruit, no JVD, supple, symmetrical, trachea midline and thyroid not enlarged, symmetric, no tenderness/mass/nodules  Lungs: clear to auscultation bilaterally  Heart: regular rate and rhythm, S1, S2 normal, no murmur, click, rub or gallop  Abdomen: soft, non-tender; bowel sounds normal; no masses,  no organomegaly  Extremities: extremities normal, atraumatic, no cyanosis or edema  Neurologic: Mental status: Alert, oriented, thought content appropriate       Labs:-    CBC:   Recent Labs      11/11/18 0454  11/12/18   0644  11/13/18   0625   WBC  9.5  15.3*  14.3*   HGB  9.7*  8.4*  8.3*   PLT  See Reflexed IPF Result  351  See Reflexed IPF Result     BMP:  Recent Labs      11/11/18 0454  11/12/18   0644  11/13/18   0625   NA  137  140  146*   K  3.4*  3.7  4.2   CL  102  107  114*   CO2  24  24  20   BUN  4*  3*  4*   CREATININE  0.63  0.59  0.61   GLUCOSE  78  100*  94     Calcium:  Recent Labs      11/13/18 0625   CALCIUM  7.7*     Ionized Calcium:No results for input(s): IONCA in the last 72 hours. Magnesium:  Recent Labs      11/11/18 0454   MG  1.5*     Phosphorus:No results for input(s): PHOS in the last 72 hours. BNP:No results for input(s): BNP in the last 72 hours. Glucose:No results for input(s): POCGLU in the last 72 hours. HgbA1C: No results for input(s): LABA1C in the last 72 hours. INR: No results for input(s): INR in the last 72 hours. Hepatic: No results for input(s): ALKPHOS, ALT, AST, PROT, BILITOT, BILIDIR, LABALBU in the last 72 hours. Amylase and Lipase:No results for input(s): LACTA, AMYLASE in the last 72 hours. Lactic Acid: No results for input(s): LACTA in the last 72 hours. CARDIAC ENZYMES:No results for input(s): CKTOTAL, CKMB, CKMBINDEX, TROPONINI in the last 72 hours. BNP: No results for input(s): BNP in the last 72 hours. Lipids: No results for input(s): CHOL, TRIG, HDL, LDLCALC in the last 72 hours.     Invalid input(s): LDL  ABGs: No results found for: PH, PCO2, PO2, HCO3, O2SAT  Thyroid:   Lab Results   Component Value Date    TSH 0.37 10/10/2018      Urinalysis: Color, UA   Date Value Ref Range Status   11/04/2018 YELLOW YEL Final     pH, UA   Date Value Ref Range Status   11/04/2018 7.0 5.0 - 8.0 Final     Specific Gravity, UA   Date Value Ref Range Status   11/04/2018 1.031 (H) 1.005 - 1.030 Final     Protein, UA   Date Value Ref Range Status   11/04/2018 NEGATIVE NEG Final     RBC, UA   Date Value Ref Range Status   11/04/2018 2 TO 5 0 - 2 /HPF Final

## 2018-11-13 NOTE — DISCHARGE INSTR - COC
Continuity of Care Form    Patient Name: Julian Roth   :  1955  MRN:  [id number]    Admit date:  2018  Discharge date:  2018    Code Status Order: Full Code   Advance Directives:   [facility] Documentation     Date/Time Healthcare Directive Type of Healthcare Directive Copy in [facility] [address] Agent's Name Healthcare Agent's Phone Number    18 [phone]  No, patient does not have an advance directive for healthcare treatment -- -- -- -- --          Admitting Physician:  Fritz Leija MD  PCP: Elba Brewer    Discharging Nurse: [facility] Unit/Room#: 7534/2683-47  Discharging Unit Phone Number: [phone]    Emergency Contact:   Extended Emergency Contact Information  Primary Emergency Contact: Conor [address] Phone: [phone]  Work Phone: [phone]  Mobile Phone: [phone]  Relation: Child  Secondary Emergency Contact: Jadene [place]  Mobile Phone: [phone]  Relation: Other    Past Surgical History:  Past Surgical History:   Procedure Laterality Date    APPENDECTOMY  2017     SECTION      GASTRIC BYPASS SURGERY      LAPAROSCOPIC APPENDECTOMY N/A 3/27/2017    APPENDECTOMY LAPAROSCOPIC performed by Marlon Caputo MD at [facility]      hgsil    UT COLONOSCOPY W/BIOPSY SINGLE/MULTIPLE N/A 11/10/2018    COLONOSCOPY WITH BIOPSY performed by Colonel Hitesh MD at [facility]    UT EGD TRANSORAL BIOPSY SINGLE/MULTIPLE N/A 2018    EGD ESOPHAGOGASTRODUODENOSCOPY performed by Dayna Ramirez MD at [address] [place] [address]      oesophagitis, candidiasis    UPPER GASTROINTESTINAL ENDOSCOPY  2018    UPPER GASTROINTESTINAL ENDOSCOPY N/A 2018    EGD CONTROL HEMORRHAGE performed by Dayna Ramirez MD at [address] [place] [address] History:    There is no immunization history for the selected administration

## 2018-11-16 ENCOUNTER — TELEPHONE (OUTPATIENT)
Dept: GASTROENTEROLOGY | Age: 63
End: 2018-11-16

## 2018-12-05 ENCOUNTER — OFFICE VISIT (OUTPATIENT)
Dept: PULMONOLOGY | Age: 63
End: 2018-12-05
Payer: MEDICARE

## 2018-12-05 VITALS
HEIGHT: 59 IN | RESPIRATION RATE: 14 BRPM | WEIGHT: 130 LBS | OXYGEN SATURATION: 90 % | SYSTOLIC BLOOD PRESSURE: 168 MMHG | HEART RATE: 107 BPM | DIASTOLIC BLOOD PRESSURE: 100 MMHG | BODY MASS INDEX: 26.21 KG/M2

## 2018-12-05 DIAGNOSIS — J96.11 CHRONIC RESPIRATORY FAILURE WITH HYPOXIA AND HYPERCAPNIA (HCC): ICD-10-CM

## 2018-12-05 DIAGNOSIS — Z87.891 HISTORY OF SMOKING AT LEAST 1 PACK PER DAY FOR AT LEAST 30 YEARS: ICD-10-CM

## 2018-12-05 DIAGNOSIS — J44.9 CHRONIC OBSTRUCTIVE PULMONARY DISEASE, UNSPECIFIED COPD TYPE (HCC): Primary | ICD-10-CM

## 2018-12-05 DIAGNOSIS — J96.12 CHRONIC RESPIRATORY FAILURE WITH HYPOXIA AND HYPERCAPNIA (HCC): ICD-10-CM

## 2018-12-05 PROCEDURE — 1123F ACP DISCUSS/DSCN MKR DOCD: CPT | Performed by: INTERNAL MEDICINE

## 2018-12-05 PROCEDURE — 1090F PRES/ABSN URINE INCON ASSESS: CPT | Performed by: INTERNAL MEDICINE

## 2018-12-05 PROCEDURE — 4040F PNEUMOC VAC/ADMIN/RCVD: CPT | Performed by: INTERNAL MEDICINE

## 2018-12-05 PROCEDURE — 1036F TOBACCO NON-USER: CPT | Performed by: INTERNAL MEDICINE

## 2018-12-05 PROCEDURE — 1101F PT FALLS ASSESS-DOCD LE1/YR: CPT | Performed by: INTERNAL MEDICINE

## 2018-12-05 PROCEDURE — G8926 SPIRO NO PERF OR DOC: HCPCS | Performed by: INTERNAL MEDICINE

## 2018-12-05 PROCEDURE — G8400 PT W/DXA NO RESULTS DOC: HCPCS | Performed by: INTERNAL MEDICINE

## 2018-12-05 PROCEDURE — 3017F COLORECTAL CA SCREEN DOC REV: CPT | Performed by: INTERNAL MEDICINE

## 2018-12-05 PROCEDURE — 1111F DSCHRG MED/CURRENT MED MERGE: CPT | Performed by: INTERNAL MEDICINE

## 2018-12-05 PROCEDURE — G8419 CALC BMI OUT NRM PARAM NOF/U: HCPCS | Performed by: INTERNAL MEDICINE

## 2018-12-05 PROCEDURE — 3023F SPIROM DOC REV: CPT | Performed by: INTERNAL MEDICINE

## 2018-12-05 PROCEDURE — G8484 FLU IMMUNIZE NO ADMIN: HCPCS | Performed by: INTERNAL MEDICINE

## 2018-12-05 PROCEDURE — 99215 OFFICE O/P EST HI 40 MIN: CPT | Performed by: INTERNAL MEDICINE

## 2018-12-05 PROCEDURE — G8427 DOCREV CUR MEDS BY ELIG CLIN: HCPCS | Performed by: INTERNAL MEDICINE

## 2018-12-05 RX ORDER — ALBUTEROL SULFATE 90 UG/1
1-2 AEROSOL, METERED RESPIRATORY (INHALATION) EVERY 4 HOURS PRN
Qty: 1 INHALER | Refills: 5 | Status: SHIPPED | OUTPATIENT
Start: 2018-12-05 | End: 2020-01-09 | Stop reason: SDUPTHER

## 2018-12-05 RX ORDER — IPRATROPIUM BROMIDE AND ALBUTEROL SULFATE 2.5; .5 MG/3ML; MG/3ML
3 SOLUTION RESPIRATORY (INHALATION) EVERY 4 HOURS
Qty: 1 VIAL | Refills: 5 | Status: ON HOLD | OUTPATIENT
Start: 2018-12-05 | End: 2019-01-11 | Stop reason: HOSPADM

## 2018-12-05 NOTE — PROGRESS NOTES
Final     Hemoglobin   Date Value Ref Range Status   11/13/2018 8.3 (L) 11.9 - 15.1 g/dL Final   11/12/2018 8.4 (L) 11.9 - 15.1 g/dL Final   11/11/2018 9.7 (L) 11.9 - 15.1 g/dL Final     Platelet Count   Date Value Ref Range Status   05/04/2012 316 140 - 450 k/uL Final   04/02/2012 308 130 - 400 k/uL Final     Platelets   Date Value Ref Range Status   11/13/2018 See Reflexed IPF Result 138 - 453 k/uL Final   11/12/2018 351 138 - 453 k/uL Final   11/11/2018 See Reflexed IPF Result 138 - 453 k/uL Final     BMP:   Sodium   Date Value Ref Range Status   11/13/2018 146 (H) 135 - 144 mmol/L Final   11/12/2018 140 135 - 144 mmol/L Final   11/11/2018 137 135 - 144 mmol/L Final     Potassium   Date Value Ref Range Status   11/13/2018 4.2 3.7 - 5.3 mmol/L Final   11/12/2018 3.7 3.7 - 5.3 mmol/L Final   11/11/2018 3.4 (L) 3.7 - 5.3 mmol/L Final     Chloride   Date Value Ref Range Status   11/13/2018 114 (H) 98 - 107 mmol/L Final   11/12/2018 107 98 - 107 mmol/L Final   11/11/2018 102 98 - 107 mmol/L Final     CO2   Date Value Ref Range Status   11/13/2018 20 20 - 31 mmol/L Final   11/12/2018 24 20 - 31 mmol/L Final   11/11/2018 24 20 - 31 mmol/L Final     BUN   Date Value Ref Range Status   11/13/2018 4 (L) 8 - 23 mg/dL Final   11/12/2018 3 (L) 8 - 23 mg/dL Final   11/11/2018 4 (L) 8 - 23 mg/dL Final     CREATININE   Date Value Ref Range Status   11/13/2018 0.61 0.50 - 0.90 mg/dL Final   11/12/2018 0.59 0.50 - 0.90 mg/dL Final   11/11/2018 0.63 0.50 - 0.90 mg/dL Final     Glucose   Date Value Ref Range Status   11/13/2018 94 70 - 99 mg/dL Final   11/12/2018 100 (H) 70 - 99 mg/dL Final   11/11/2018 78 70 - 99 mg/dL Final   05/04/2012 76 74 - 106 mg/dL Final   04/02/2012 83 74 - 106 mg/dL Final     Hepatic:   AST   Date Value Ref Range Status   11/04/2018 30 <32 U/L Final   10/23/2018 61 (H) <32 U/L Final   09/13/2018 21 <32 U/L Final     ALT   Date Value Ref Range Status   11/04/2018 15 5 - 33 U/L Final   10/23/2018 26 5 - 33

## 2018-12-28 ENCOUNTER — HOSPITAL ENCOUNTER (OUTPATIENT)
Dept: OTHER | Age: 63
Discharge: HOME | End: 2018-12-28
Payer: MEDICARE

## 2018-12-28 VITALS
HEART RATE: 90 BPM | SYSTOLIC BLOOD PRESSURE: 105 MMHG | RESPIRATION RATE: 16 BRPM | BODY MASS INDEX: 25.77 KG/M2 | OXYGEN SATURATION: 97 % | DIASTOLIC BLOOD PRESSURE: 67 MMHG | WEIGHT: 127.6 LBS

## 2018-12-28 PROCEDURE — 99211 OFF/OP EST MAY X REQ PHY/QHP: CPT

## 2018-12-28 NOTE — PROGRESS NOTES
Date:  2018  Time:  12:27 PM    CHF Clinic at [facility]    Office: [phone] Fax: [phone]    Re:  Emi Daniels   Patient : 1955    Vital Signs: /67   Pulse 90   Resp 16   Wt 127 lb 9.6 oz (57.9 kg)   LMP 2015   SpO2 97%   BMI 25.77 kg/m²                       O2 Device: None (Room air)                           No results for input(s): CBC, HGB, HCT, WBC, PLATELET, NA, K, CL, CO2, BUN, CREATININE, GLUCOSE, BNP, INR in the last 72 hours. Respiratory:    Assessment  Charting Type: Admission    Breath Sounds  Right Upper Lobe: Clear  Right Middle Lobe: Clear  Right Lower Lobe: Clear  Left Upper Lobe: Clear  Left Lower Lobe: Clear    Cough/Sputum  Cough: Productive  Frequency: Occasional  Sputum Amount: Small  Sputum Color: Clear         Peripheral Vascular  RLE Edema: None  LLE Edema: None      Complaints: No complaints at this time    Physician Orders None    Comment : Patient here for scheduled visit. Weight and vital signs obtained and assessment completed. Patient states she has dyspnea with minimal exertion, but it is no worse than usual. She denies any chest pain at this time. She does have a distended abdomen, and issues with her stomach for a long time. She denies any abdominal pain at this time, but states she was supposed to follow up with a GI doctor after discharge from the hospital but missed her appointment. Encouraged patient to call the office and reschedule her appt. And she verbalized understanding. Teaching initiated on importance of weighing herself daily, following a low sodium diet, fluid restrictions of 2L/24 hours, S/S CHF and disease process. Hand-outs on low sodium diet given to patient to take home with her. Medication list reviewed and updated. Patient did not bring a current list with her today, encouraged patient to bring actual pill bottles or updated medication list to her next appointment. Next CHF Clinic appt.  In 4

## 2019-01-07 ENCOUNTER — APPOINTMENT (OUTPATIENT)
Dept: CT IMAGING | Age: 64
DRG: 720 | End: 2019-01-07
Payer: MEDICARE

## 2019-01-07 ENCOUNTER — APPOINTMENT (OUTPATIENT)
Dept: GENERAL RADIOLOGY | Age: 64
DRG: 720 | End: 2019-01-07
Payer: MEDICARE

## 2019-01-07 ENCOUNTER — HOSPITAL ENCOUNTER (INPATIENT)
Age: 64
LOS: 4 days | Discharge: HOME HEALTH | DRG: 720 | End: 2019-01-11
Attending: EMERGENCY MEDICINE | Admitting: INTERNAL MEDICINE
Payer: MEDICARE

## 2019-01-07 DIAGNOSIS — A41.9 SEVERE SEPSIS (HCC): Primary | ICD-10-CM

## 2019-01-07 DIAGNOSIS — R65.20 SEVERE SEPSIS (HCC): Primary | ICD-10-CM

## 2019-01-07 LAB
-: NORMAL
ABSOLUTE EOS #: 0 K/UL (ref 0–0.4)
ABSOLUTE IMMATURE GRANULOCYTE: 0.32 K/UL (ref 0–0.3)
ABSOLUTE LYMPH #: 1.75 K/UL (ref 1–4.8)
ABSOLUTE MONO #: 1.75 K/UL (ref 0.1–0.8)
ALBUMIN SERPL-MCNC: 3.4 G/DL (ref 3.5–5.2)
ALBUMIN/GLOBULIN RATIO: 1 (ref 1–2.5)
ALLEN TEST: ABNORMAL
ALP BLD-CCNC: 40 U/L (ref 35–104)
ALT SERPL-CCNC: 13 U/L (ref 5–33)
AMMONIA: 29 UMOL/L (ref 11–51)
AMORPHOUS: NORMAL
ANION GAP SERPL CALCULATED.3IONS-SCNC: 5 MMOL/L (ref 9–17)
AST SERPL-CCNC: 19 U/L
BACTERIA: NORMAL
BASOPHILS # BLD: 0 % (ref 0–2)
BASOPHILS ABSOLUTE: 0 K/UL (ref 0–0.2)
BILIRUB SERPL-MCNC: 0.31 MG/DL (ref 0.3–1.2)
BILIRUBIN URINE: NEGATIVE
BNP INTERPRETATION: NORMAL
BUN BLDV-MCNC: 23 MG/DL (ref 8–23)
BUN/CREAT BLD: ABNORMAL (ref 9–20)
CALCIUM SERPL-MCNC: 8.4 MG/DL (ref 8.6–10.4)
CASTS UA: NORMAL /LPF (ref 0–8)
CHLORIDE BLD-SCNC: 101 MMOL/L (ref 98–107)
CO2: 26 MMOL/L (ref 20–31)
COLOR: YELLOW
COMMENT UA: ABNORMAL
CREAT SERPL-MCNC: 0.89 MG/DL (ref 0.5–0.9)
CRYSTALS, UA: NORMAL /HPF
DIFFERENTIAL TYPE: ABNORMAL
DIRECT EXAM: NORMAL
EOSINOPHILS RELATIVE PERCENT: 0 % (ref 1–4)
EPITHELIAL CELLS UA: NORMAL /HPF (ref 0–5)
FIO2: ABNORMAL
GFR AFRICAN AMERICAN: >60 ML/MIN
GFR NON-AFRICAN AMERICAN: >60 ML/MIN
GFR SERPL CREATININE-BSD FRML MDRD: ABNORMAL ML/MIN/{1.73_M2}
GFR SERPL CREATININE-BSD FRML MDRD: ABNORMAL ML/MIN/{1.73_M2}
GLUCOSE BLD-MCNC: 97 MG/DL (ref 70–99)
GLUCOSE URINE: NEGATIVE
HCO3 VENOUS: 26.4 MMOL/L (ref 22–29)
HCT VFR BLD CALC: 31.6 % (ref 36.3–47.1)
HEMOGLOBIN: 9.6 G/DL (ref 11.9–15.1)
IMMATURE GRANULOCYTES: 2 %
INR BLD: 0.9
KETONES, URINE: NEGATIVE
LACTIC ACID, WHOLE BLOOD: 1.2 MMOL/L (ref 0.7–2.1)
LEUKOCYTE ESTERASE, URINE: NEGATIVE
LIPASE: 17 U/L (ref 13–60)
LYMPHOCYTES # BLD: 11 % (ref 24–44)
Lab: NORMAL
MCH RBC QN AUTO: 25.6 PG (ref 25.2–33.5)
MCHC RBC AUTO-ENTMCNC: 30.4 G/DL (ref 28.4–34.8)
MCV RBC AUTO: 84.3 FL (ref 82.6–102.9)
MODE: ABNORMAL
MONOCYTES # BLD: 11 % (ref 1–7)
MORPHOLOGY: ABNORMAL
MUCUS: NORMAL
NEGATIVE BASE EXCESS, VEN: ABNORMAL (ref 0–2)
NITRITE, URINE: NEGATIVE
NRBC AUTOMATED: 1.6 PER 100 WBC
NUCLEATED RED BLOOD CELLS: 1 PER 100 WBC
O2 DEVICE/FLOW/%: ABNORMAL
O2 SAT, VEN: 94 % (ref 60–85)
OTHER OBSERVATIONS UA: NORMAL
PARTIAL THROMBOPLASTIN TIME: 20.5 SEC (ref 20.5–30.5)
PATIENT TEMP: ABNORMAL
PCO2, VEN: 47.7 MM HG (ref 41–51)
PDW BLD-RTO: 21.1 % (ref 11.8–14.4)
PH UA: 5.5 (ref 5–8)
PH VENOUS: 7.35 (ref 7.32–7.43)
PLATELET # BLD: 486 K/UL (ref 138–453)
PLATELET ESTIMATE: ABNORMAL
PMV BLD AUTO: 8.6 FL (ref 8.1–13.5)
PO2, VEN: 77.3 MM HG (ref 30–50)
POC PCO2 TEMP: ABNORMAL MM HG
POC PH TEMP: ABNORMAL
POC PO2 TEMP: ABNORMAL MM HG
POSITIVE BASE EXCESS, VEN: 0 (ref 0–3)
POTASSIUM SERPL-SCNC: 5.2 MMOL/L (ref 3.7–5.3)
PRO-BNP: 201 PG/ML
PROTEIN UA: NEGATIVE
PROTHROMBIN TIME: 10.2 SEC (ref 9–12)
RBC # BLD: 3.75 M/UL (ref 3.95–5.11)
RBC # BLD: ABNORMAL 10*6/UL
RBC UA: NORMAL /HPF (ref 0–4)
RENAL EPITHELIAL, UA: NORMAL /HPF
SAMPLE SITE: ABNORMAL
SEG NEUTROPHILS: 76 % (ref 36–66)
SEGMENTED NEUTROPHILS ABSOLUTE COUNT: 12.08 K/UL (ref 1.8–7.7)
SODIUM BLD-SCNC: 132 MMOL/L (ref 135–144)
SPECIFIC GRAVITY UA: 1.03 (ref 1–1.03)
SPECIMEN DESCRIPTION: NORMAL
STATUS: NORMAL
TOTAL CO2, VENOUS: 28 MMOL/L (ref 23–30)
TOTAL PROTEIN: 6.9 G/DL (ref 6.4–8.3)
TRICHOMONAS: NORMAL
TROPONIN INTERP: ABNORMAL
TROPONIN INTERP: ABNORMAL
TROPONIN T: ABNORMAL NG/ML
TROPONIN T: ABNORMAL NG/ML
TROPONIN, HIGH SENSITIVITY: 27 NG/L (ref 0–14)
TROPONIN, HIGH SENSITIVITY: 34 NG/L (ref 0–14)
TURBIDITY: CLEAR
URINE HGB: ABNORMAL
UROBILINOGEN, URINE: NORMAL
WBC # BLD: 15.9 K/UL (ref 3.5–11.3)
WBC # BLD: ABNORMAL 10*3/UL
WBC UA: NORMAL /HPF (ref 0–5)
YEAST: NORMAL

## 2019-01-07 PROCEDURE — 87070 CULTURE OTHR SPECIMN AEROBIC: CPT

## 2019-01-07 PROCEDURE — 74177 CT ABD & PELVIS W/CONTRAST: CPT

## 2019-01-07 PROCEDURE — 80053 COMPREHEN METABOLIC PANEL: CPT

## 2019-01-07 PROCEDURE — 2000000000 HC ICU R&B

## 2019-01-07 PROCEDURE — 85730 THROMBOPLASTIN TIME PARTIAL: CPT

## 2019-01-07 PROCEDURE — 82803 BLOOD GASES ANY COMBINATION: CPT

## 2019-01-07 PROCEDURE — 84484 ASSAY OF TROPONIN QUANT: CPT

## 2019-01-07 PROCEDURE — 81001 URINALYSIS AUTO W/SCOPE: CPT

## 2019-01-07 PROCEDURE — 87205 SMEAR GRAM STAIN: CPT

## 2019-01-07 PROCEDURE — 93005 ELECTROCARDIOGRAM TRACING: CPT

## 2019-01-07 PROCEDURE — 71260 CT THORAX DX C+: CPT

## 2019-01-07 PROCEDURE — 87040 BLOOD CULTURE FOR BACTERIA: CPT

## 2019-01-07 PROCEDURE — 94660 CPAP INITIATION&MGMT: CPT

## 2019-01-07 PROCEDURE — 86403 PARTICLE AGGLUT ANTBDY SCRN: CPT

## 2019-01-07 PROCEDURE — 83605 ASSAY OF LACTIC ACID: CPT

## 2019-01-07 PROCEDURE — 94762 N-INVAS EAR/PLS OXIMTRY CONT: CPT

## 2019-01-07 PROCEDURE — 6360000002 HC RX W HCPCS: Performed by: EMERGENCY MEDICINE

## 2019-01-07 PROCEDURE — 85025 COMPLETE CBC W/AUTO DIFF WBC: CPT

## 2019-01-07 PROCEDURE — 99285 EMERGENCY DEPT VISIT HI MDM: CPT

## 2019-01-07 PROCEDURE — 82140 ASSAY OF AMMONIA: CPT

## 2019-01-07 PROCEDURE — 83690 ASSAY OF LIPASE: CPT

## 2019-01-07 PROCEDURE — 85610 PROTHROMBIN TIME: CPT

## 2019-01-07 PROCEDURE — 6360000004 HC RX CONTRAST MEDICATION: Performed by: EMERGENCY MEDICINE

## 2019-01-07 PROCEDURE — 83880 ASSAY OF NATRIURETIC PEPTIDE: CPT

## 2019-01-07 PROCEDURE — 2580000003 HC RX 258: Performed by: EMERGENCY MEDICINE

## 2019-01-07 PROCEDURE — 2500000003 HC RX 250 WO HCPCS: Performed by: EMERGENCY MEDICINE

## 2019-01-07 PROCEDURE — 96365 THER/PROPH/DIAG IV INF INIT: CPT

## 2019-01-07 PROCEDURE — 87804 INFLUENZA ASSAY W/OPTIC: CPT

## 2019-01-07 PROCEDURE — 6370000000 HC RX 637 (ALT 250 FOR IP): Performed by: EMERGENCY MEDICINE

## 2019-01-07 PROCEDURE — 82805 BLOOD GASES W/O2 SATURATION: CPT

## 2019-01-07 PROCEDURE — 36415 COLL VENOUS BLD VENIPUNCTURE: CPT

## 2019-01-07 PROCEDURE — 94640 AIRWAY INHALATION TREATMENT: CPT

## 2019-01-07 PROCEDURE — 71045 X-RAY EXAM CHEST 1 VIEW: CPT

## 2019-01-07 RX ORDER — ACETAMINOPHEN 325 MG/1
650 TABLET ORAL EVERY 4 HOURS PRN
Status: DISCONTINUED | OUTPATIENT
Start: 2019-01-07 | End: 2019-01-11 | Stop reason: HOSPADM

## 2019-01-07 RX ORDER — SODIUM CHLORIDE, SODIUM LACTATE, POTASSIUM CHLORIDE, AND CALCIUM CHLORIDE .6; .31; .03; .02 G/100ML; G/100ML; G/100ML; G/100ML
1200 INJECTION, SOLUTION INTRAVENOUS ONCE
Status: COMPLETED | OUTPATIENT
Start: 2019-01-07 | End: 2019-01-07

## 2019-01-07 RX ORDER — ALBUTEROL SULFATE 90 UG/1
2 AEROSOL, METERED RESPIRATORY (INHALATION)
Status: DISCONTINUED | OUTPATIENT
Start: 2019-01-07 | End: 2019-01-08

## 2019-01-07 RX ORDER — SODIUM CHLORIDE 0.9 % (FLUSH) 0.9 %
10 SYRINGE (ML) INJECTION PRN
Status: DISCONTINUED | OUTPATIENT
Start: 2019-01-07 | End: 2019-01-08

## 2019-01-07 RX ORDER — ALBUTEROL SULFATE 2.5 MG/3ML
5 SOLUTION RESPIRATORY (INHALATION)
Status: DISCONTINUED | OUTPATIENT
Start: 2019-01-07 | End: 2019-01-08

## 2019-01-07 RX ORDER — ANALGESIC BALM 1.74; 4.06 G/29G; G/29G
OINTMENT TOPICAL ONCE
Status: COMPLETED | OUTPATIENT
Start: 2019-01-08 | End: 2019-01-08

## 2019-01-07 RX ORDER — IPRATROPIUM BROMIDE AND ALBUTEROL SULFATE 2.5; .5 MG/3ML; MG/3ML
1 SOLUTION RESPIRATORY (INHALATION)
Status: DISCONTINUED | OUTPATIENT
Start: 2019-01-07 | End: 2019-01-08

## 2019-01-07 RX ORDER — SODIUM CHLORIDE 0.9 % (FLUSH) 0.9 %
10 SYRINGE (ML) INJECTION PRN
Status: DISCONTINUED | OUTPATIENT
Start: 2019-01-07 | End: 2019-01-11 | Stop reason: HOSPADM

## 2019-01-07 RX ORDER — ACETAMINOPHEN 325 MG/1
650 TABLET ORAL ONCE
Status: COMPLETED | OUTPATIENT
Start: 2019-01-07 | End: 2019-01-07

## 2019-01-07 RX ORDER — ONDANSETRON 2 MG/ML
4 INJECTION INTRAMUSCULAR; INTRAVENOUS EVERY 6 HOURS PRN
Status: DISCONTINUED | OUTPATIENT
Start: 2019-01-07 | End: 2019-01-11 | Stop reason: HOSPADM

## 2019-01-07 RX ORDER — SODIUM CHLORIDE 0.9 % (FLUSH) 0.9 %
10 SYRINGE (ML) INJECTION EVERY 12 HOURS SCHEDULED
Status: DISCONTINUED | OUTPATIENT
Start: 2019-01-08 | End: 2019-01-11 | Stop reason: HOSPADM

## 2019-01-07 RX ORDER — ONDANSETRON 4 MG/1
4 TABLET, ORALLY DISINTEGRATING ORAL EVERY 8 HOURS PRN
Status: DISCONTINUED | OUTPATIENT
Start: 2019-01-07 | End: 2019-01-11 | Stop reason: HOSPADM

## 2019-01-07 RX ORDER — VANCOMYCIN HYDROCHLORIDE 1 G/200ML
1000 INJECTION, SOLUTION INTRAVENOUS ONCE
Status: COMPLETED | OUTPATIENT
Start: 2019-01-07 | End: 2019-01-07

## 2019-01-07 RX ORDER — IPRATROPIUM BROMIDE AND ALBUTEROL SULFATE 2.5; .5 MG/3ML; MG/3ML
1 SOLUTION RESPIRATORY (INHALATION) 4 TIMES DAILY
Status: DISCONTINUED | OUTPATIENT
Start: 2019-01-08 | End: 2019-01-11 | Stop reason: HOSPADM

## 2019-01-07 RX ADMIN — VANCOMYCIN HYDROCHLORIDE 1000 MG: 1 INJECTION, SOLUTION INTRAVENOUS at 21:04

## 2019-01-07 RX ADMIN — DEXTROSE MONOHYDRATE 2 G: 5 INJECTION INTRAVENOUS at 19:46

## 2019-01-07 RX ADMIN — ALBUTEROL SULFATE 5 MG: 5 SOLUTION RESPIRATORY (INHALATION) at 16:27

## 2019-01-07 RX ADMIN — ACETAMINOPHEN 650 MG: 325 TABLET ORAL at 21:01

## 2019-01-07 RX ADMIN — IOPAMIDOL 75 ML: 755 INJECTION, SOLUTION INTRAVENOUS at 19:11

## 2019-01-07 RX ADMIN — SODIUM CHLORIDE, POTASSIUM CHLORIDE, SODIUM LACTATE AND CALCIUM CHLORIDE 1200 ML: 600; 310; 30; 20 INJECTION, SOLUTION INTRAVENOUS at 18:34

## 2019-01-07 RX ADMIN — IPRATROPIUM BROMIDE 0.5 MG: 0.5 SOLUTION RESPIRATORY (INHALATION) at 16:27

## 2019-01-07 ASSESSMENT — ENCOUNTER SYMPTOMS
BACK PAIN: 0
COUGH: 1
ABDOMINAL DISTENTION: 1
ABDOMINAL PAIN: 1
SORE THROAT: 0
SHORTNESS OF BREATH: 1

## 2019-01-07 ASSESSMENT — PAIN DESCRIPTION - DESCRIPTORS: DESCRIPTORS: ACHING

## 2019-01-07 ASSESSMENT — PAIN DESCRIPTION - PROGRESSION: CLINICAL_PROGRESSION: NOT CHANGED

## 2019-01-07 ASSESSMENT — PAIN SCALES - GENERAL
PAINLEVEL_OUTOF10: 9
PAINLEVEL_OUTOF10: 5

## 2019-01-07 ASSESSMENT — PAIN DESCRIPTION - FREQUENCY: FREQUENCY: CONTINUOUS

## 2019-01-07 ASSESSMENT — PAIN DESCRIPTION - LOCATION: LOCATION: GENERALIZED

## 2019-01-07 ASSESSMENT — PAIN DESCRIPTION - PAIN TYPE: TYPE: ACUTE PAIN

## 2019-01-07 ASSESSMENT — PAIN DESCRIPTION - ONSET: ONSET: GRADUAL

## 2019-01-08 ENCOUNTER — APPOINTMENT (OUTPATIENT)
Dept: GENERAL RADIOLOGY | Age: 64
DRG: 720 | End: 2019-01-08
Payer: MEDICARE

## 2019-01-08 PROBLEM — R65.20 SEVERE SEPSIS (HCC): Status: ACTIVE | Noted: 2019-01-07

## 2019-01-08 LAB
ALLEN TEST: POSITIVE
AMPHETAMINE SCREEN URINE: NEGATIVE
ANION GAP SERPL CALCULATED.3IONS-SCNC: 9 MMOL/L (ref 9–17)
BARBITURATE SCREEN URINE: NEGATIVE
BENZODIAZEPINE SCREEN, URINE: NEGATIVE
BUN BLDV-MCNC: 18 MG/DL (ref 8–23)
BUN/CREAT BLD: ABNORMAL (ref 9–20)
BUPRENORPHINE URINE: NORMAL
CALCIUM SERPL-MCNC: 8.7 MG/DL (ref 8.6–10.4)
CANNABINOID SCREEN URINE: NEGATIVE
CHLORIDE BLD-SCNC: 103 MMOL/L (ref 98–107)
CO2: 25 MMOL/L (ref 20–31)
COCAINE METABOLITE, URINE: NEGATIVE
CREAT SERPL-MCNC: 0.87 MG/DL (ref 0.5–0.9)
EKG ATRIAL RATE: 113 BPM
EKG P AXIS: 62 DEGREES
EKG P-R INTERVAL: 128 MS
EKG Q-T INTERVAL: 296 MS
EKG QRS DURATION: 68 MS
EKG QTC CALCULATION (BAZETT): 406 MS
EKG R AXIS: 64 DEGREES
EKG T AXIS: 68 DEGREES
EKG VENTRICULAR RATE: 113 BPM
FIO2: 3
GFR AFRICAN AMERICAN: >60 ML/MIN
GFR NON-AFRICAN AMERICAN: >60 ML/MIN
GFR SERPL CREATININE-BSD FRML MDRD: ABNORMAL ML/MIN/{1.73_M2}
GFR SERPL CREATININE-BSD FRML MDRD: ABNORMAL ML/MIN/{1.73_M2}
GLUCOSE BLD-MCNC: 104 MG/DL (ref 70–99)
GLUCOSE BLD-MCNC: 97 MG/DL (ref 74–100)
LACTIC ACID, WHOLE BLOOD: 1.3 MMOL/L (ref 0.7–2.1)
MDMA URINE: NORMAL
METHADONE SCREEN, URINE: NEGATIVE
METHAMPHETAMINE, URINE: NORMAL
MODE: ABNORMAL
MRSA, DNA, NASAL: NORMAL
NEGATIVE BASE EXCESS, ART: ABNORMAL (ref 0–2)
O2 DEVICE/FLOW/%: ABNORMAL
OPIATES, URINE: NEGATIVE
OXYCODONE SCREEN URINE: NEGATIVE
PARTIAL THROMBOPLASTIN TIME: 44.1 SEC (ref 20.5–30.5)
PARTIAL THROMBOPLASTIN TIME: 81.6 SEC (ref 20.5–30.5)
PATIENT TEMP: ABNORMAL
PHENCYCLIDINE, URINE: NEGATIVE
POC HCO3: 29.2 MMOL/L (ref 21–28)
POC O2 SATURATION: 92 % (ref 94–98)
POC PCO2 TEMP: ABNORMAL MM HG
POC PCO2: 51 MM HG (ref 35–48)
POC PH TEMP: ABNORMAL
POC PH: 7.37 (ref 7.35–7.45)
POC PO2 TEMP: ABNORMAL MM HG
POC PO2: 65.9 MM HG (ref 83–108)
POSITIVE BASE EXCESS, ART: 3 (ref 0–3)
POTASSIUM SERPL-SCNC: 4.9 MMOL/L (ref 3.7–5.3)
PROPOXYPHENE, URINE: NORMAL
SAMPLE SITE: ABNORMAL
SODIUM BLD-SCNC: 137 MMOL/L (ref 135–144)
SPECIMEN DESCRIPTION: NORMAL
TCO2 (CALC), ART: 31 MMOL/L (ref 22–29)
TEST INFORMATION: NORMAL
TRICYCLIC ANTIDEPRESSANTS, UR: NORMAL
TROPONIN INTERP: ABNORMAL
TROPONIN T: ABNORMAL NG/ML
TROPONIN, HIGH SENSITIVITY: 26 NG/L (ref 0–14)

## 2019-01-08 PROCEDURE — 99291 CRITICAL CARE FIRST HOUR: CPT | Performed by: INTERNAL MEDICINE

## 2019-01-08 PROCEDURE — 84484 ASSAY OF TROPONIN QUANT: CPT

## 2019-01-08 PROCEDURE — 6360000002 HC RX W HCPCS: Performed by: EMERGENCY MEDICINE

## 2019-01-08 PROCEDURE — 6370000000 HC RX 637 (ALT 250 FOR IP): Performed by: EMERGENCY MEDICINE

## 2019-01-08 PROCEDURE — 87798 DETECT AGENT NOS DNA AMP: CPT

## 2019-01-08 PROCEDURE — 87015 SPECIMEN INFECT AGNT CONCNTJ: CPT

## 2019-01-08 PROCEDURE — 2500000003 HC RX 250 WO HCPCS: Performed by: EMERGENCY MEDICINE

## 2019-01-08 PROCEDURE — S0028 INJECTION, FAMOTIDINE, 20 MG: HCPCS | Performed by: STUDENT IN AN ORGANIZED HEALTH CARE EDUCATION/TRAINING PROGRAM

## 2019-01-08 PROCEDURE — 71045 X-RAY EXAM CHEST 1 VIEW: CPT

## 2019-01-08 PROCEDURE — 83605 ASSAY OF LACTIC ACID: CPT

## 2019-01-08 PROCEDURE — 82803 BLOOD GASES ANY COMBINATION: CPT

## 2019-01-08 PROCEDURE — 87486 CHLMYD PNEUM DNA AMP PROBE: CPT

## 2019-01-08 PROCEDURE — 2580000003 HC RX 258: Performed by: EMERGENCY MEDICINE

## 2019-01-08 PROCEDURE — 87633 RESP VIRUS 12-25 TARGETS: CPT

## 2019-01-08 PROCEDURE — 87300 AG DETECTION POLYVAL IF: CPT

## 2019-01-08 PROCEDURE — 36415 COLL VENOUS BLD VENIPUNCTURE: CPT

## 2019-01-08 PROCEDURE — 87581 M.PNEUMON DNA AMP PROBE: CPT

## 2019-01-08 PROCEDURE — 94640 AIRWAY INHALATION TREATMENT: CPT

## 2019-01-08 PROCEDURE — 36600 WITHDRAWAL OF ARTERIAL BLOOD: CPT

## 2019-01-08 PROCEDURE — 87140 CULTURE TYPE IMMUNOFLUORESC: CPT

## 2019-01-08 PROCEDURE — 94761 N-INVAS EAR/PLS OXIMETRY MLT: CPT

## 2019-01-08 PROCEDURE — 87252 VIRUS INOCULATION TISSUE: CPT

## 2019-01-08 PROCEDURE — 82947 ASSAY GLUCOSE BLOOD QUANT: CPT

## 2019-01-08 PROCEDURE — 80048 BASIC METABOLIC PNL TOTAL CA: CPT

## 2019-01-08 PROCEDURE — 2700000000 HC OXYGEN THERAPY PER DAY

## 2019-01-08 PROCEDURE — 87641 MR-STAPH DNA AMP PROBE: CPT

## 2019-01-08 PROCEDURE — 80307 DRUG TEST PRSMV CHEM ANLYZR: CPT

## 2019-01-08 PROCEDURE — 6360000002 HC RX W HCPCS: Performed by: STUDENT IN AN ORGANIZED HEALTH CARE EDUCATION/TRAINING PROGRAM

## 2019-01-08 PROCEDURE — 2000000000 HC ICU R&B

## 2019-01-08 PROCEDURE — 85730 THROMBOPLASTIN TIME PARTIAL: CPT

## 2019-01-08 PROCEDURE — 87255 GENET VIRUS ISOLATE HSV: CPT

## 2019-01-08 RX ORDER — 0.9 % SODIUM CHLORIDE 0.9 %
1000 INTRAVENOUS SOLUTION INTRAVENOUS ONCE
Status: COMPLETED | OUTPATIENT
Start: 2019-01-08 | End: 2019-01-08

## 2019-01-08 RX ORDER — GABAPENTIN 300 MG/1
300 CAPSULE ORAL 3 TIMES DAILY
Status: DISCONTINUED | OUTPATIENT
Start: 2019-01-08 | End: 2019-01-08

## 2019-01-08 RX ORDER — SODIUM CHLORIDE 9 MG/ML
INJECTION, SOLUTION INTRAVENOUS CONTINUOUS
Status: DISCONTINUED | OUTPATIENT
Start: 2019-01-08 | End: 2019-01-11 | Stop reason: HOSPADM

## 2019-01-08 RX ORDER — HEPARIN SODIUM 1000 [USP'U]/ML
30 INJECTION, SOLUTION INTRAVENOUS; SUBCUTANEOUS PRN
Status: DISCONTINUED | OUTPATIENT
Start: 2019-01-08 | End: 2019-01-08

## 2019-01-08 RX ORDER — HEPARIN SODIUM 1000 [USP'U]/ML
60 INJECTION, SOLUTION INTRAVENOUS; SUBCUTANEOUS PRN
Status: DISCONTINUED | OUTPATIENT
Start: 2019-01-08 | End: 2019-01-08

## 2019-01-08 RX ORDER — HEPARIN SODIUM 1000 [USP'U]/ML
60 INJECTION, SOLUTION INTRAVENOUS; SUBCUTANEOUS ONCE
Status: COMPLETED | OUTPATIENT
Start: 2019-01-08 | End: 2019-01-08

## 2019-01-08 RX ORDER — HEPARIN SODIUM 10000 [USP'U]/100ML
12 INJECTION, SOLUTION INTRAVENOUS CONTINUOUS
Status: DISCONTINUED | OUTPATIENT
Start: 2019-01-08 | End: 2019-01-08

## 2019-01-08 RX ORDER — VANCOMYCIN HYDROCHLORIDE 1 G/200ML
1000 INJECTION, SOLUTION INTRAVENOUS EVERY 24 HOURS
Status: DISCONTINUED | OUTPATIENT
Start: 2019-01-08 | End: 2019-01-09

## 2019-01-08 RX ORDER — BUPRENORPHINE HYDROCHLORIDE AND NALOXONE HYDROCHLORIDE DIHYDRATE 8; 2 MG/1; MG/1
1.5 TABLET SUBLINGUAL DAILY
Status: DISCONTINUED | OUTPATIENT
Start: 2019-01-08 | End: 2019-01-08

## 2019-01-08 RX ORDER — GABAPENTIN 100 MG/1
200 CAPSULE ORAL 3 TIMES DAILY
Status: DISCONTINUED | OUTPATIENT
Start: 2019-01-08 | End: 2019-01-11 | Stop reason: HOSPADM

## 2019-01-08 RX ORDER — ALBUTEROL SULFATE 2.5 MG/3ML
2.5 SOLUTION RESPIRATORY (INHALATION) EVERY 4 HOURS PRN
Status: DISCONTINUED | OUTPATIENT
Start: 2019-01-08 | End: 2019-01-11 | Stop reason: HOSPADM

## 2019-01-08 RX ORDER — BUPRENORPHINE HYDROCHLORIDE AND NALOXONE HYDROCHLORIDE DIHYDRATE 2; .5 MG/1; MG/1
2 TABLET SUBLINGUAL DAILY
Status: DISCONTINUED | OUTPATIENT
Start: 2019-01-08 | End: 2019-01-11 | Stop reason: HOSPADM

## 2019-01-08 RX ADMIN — IPRATROPIUM BROMIDE AND ALBUTEROL SULFATE 1 AMPULE: .5; 3 SOLUTION RESPIRATORY (INHALATION) at 00:10

## 2019-01-08 RX ADMIN — HEPARIN SODIUM AND DEXTROSE 12 UNITS/KG/HR: 10000; 5 INJECTION INTRAVENOUS at 01:42

## 2019-01-08 RX ADMIN — GABAPENTIN 200 MG: 100 CAPSULE ORAL at 21:30

## 2019-01-08 RX ADMIN — AZTREONAM 2 G: 2 INJECTION, POWDER, LYOPHILIZED, FOR SOLUTION INTRAMUSCULAR; INTRAVENOUS at 17:29

## 2019-01-08 RX ADMIN — FAMOTIDINE 20 MG: 10 INJECTION, SOLUTION INTRAVENOUS at 21:30

## 2019-01-08 RX ADMIN — ANALGESIC BALM: 1.74; 4.06 OINTMENT TOPICAL at 02:00

## 2019-01-08 RX ADMIN — ACETAMINOPHEN 650 MG: 325 TABLET ORAL at 17:35

## 2019-01-08 RX ADMIN — HEPARIN SODIUM: 1000 INJECTION, SOLUTION INTRAVENOUS; SUBCUTANEOUS at 01:15

## 2019-01-08 RX ADMIN — AZTREONAM 2 G: 2 INJECTION, POWDER, LYOPHILIZED, FOR SOLUTION INTRAMUSCULAR; INTRAVENOUS at 05:24

## 2019-01-08 RX ADMIN — IPRATROPIUM BROMIDE AND ALBUTEROL SULFATE 1 AMPULE: .5; 3 SOLUTION RESPIRATORY (INHALATION) at 17:48

## 2019-01-08 RX ADMIN — BENZOCAINE, MENTHOL 1 LOZENGE: 15; 3.6 LOZENGE ORAL at 06:24

## 2019-01-08 RX ADMIN — BUPRENORPHINE AND NALOXONE 2 TABLET: 2; .5 TABLET SUBLINGUAL at 11:39

## 2019-01-08 RX ADMIN — SODIUM CHLORIDE 1000 ML: 9 INJECTION, SOLUTION INTRAVENOUS at 11:14

## 2019-01-08 RX ADMIN — GABAPENTIN 200 MG: 100 CAPSULE ORAL at 17:29

## 2019-01-08 RX ADMIN — IPRATROPIUM BROMIDE AND ALBUTEROL SULFATE 1 AMPULE: .5; 3 SOLUTION RESPIRATORY (INHALATION) at 12:00

## 2019-01-08 RX ADMIN — IPRATROPIUM BROMIDE AND ALBUTEROL SULFATE 1 AMPULE: .5; 3 SOLUTION RESPIRATORY (INHALATION) at 09:25

## 2019-01-08 RX ADMIN — VANCOMYCIN HYDROCHLORIDE 1000 MG: 1 INJECTION, SOLUTION INTRAVENOUS at 06:54

## 2019-01-08 RX ADMIN — SODIUM CHLORIDE: 9 INJECTION, SOLUTION INTRAVENOUS at 01:42

## 2019-01-08 RX ADMIN — ACETAMINOPHEN 650 MG: 325 TABLET ORAL at 06:52

## 2019-01-08 RX ADMIN — IPRATROPIUM BROMIDE AND ALBUTEROL SULFATE 1 AMPULE: .5; 3 SOLUTION RESPIRATORY (INHALATION) at 19:45

## 2019-01-08 ASSESSMENT — PAIN DESCRIPTION - PROGRESSION

## 2019-01-08 ASSESSMENT — PAIN DESCRIPTION - FREQUENCY: FREQUENCY: CONTINUOUS

## 2019-01-08 ASSESSMENT — PAIN DESCRIPTION - PAIN TYPE: TYPE: CHRONIC PAIN

## 2019-01-08 ASSESSMENT — PAIN SCALES - GENERAL
PAINLEVEL_OUTOF10: 10
PAINLEVEL_OUTOF10: 7
PAINLEVEL_OUTOF10: 10
PAINLEVEL_OUTOF10: 10

## 2019-01-08 ASSESSMENT — PAIN DESCRIPTION - ONSET: ONSET: AWAKENED FROM SLEEP

## 2019-01-08 ASSESSMENT — PAIN DESCRIPTION - LOCATION: LOCATION: KNEE

## 2019-01-08 ASSESSMENT — PAIN DESCRIPTION - DESCRIPTORS: DESCRIPTORS: ACHING

## 2019-01-09 ENCOUNTER — APPOINTMENT (OUTPATIENT)
Dept: GENERAL RADIOLOGY | Age: 64
DRG: 720 | End: 2019-01-09
Payer: MEDICARE

## 2019-01-09 LAB
ANION GAP SERPL CALCULATED.3IONS-SCNC: 11 MMOL/L (ref 9–17)
BUN BLDV-MCNC: 10 MG/DL (ref 8–23)
BUN/CREAT BLD: ABNORMAL (ref 9–20)
CALCIUM SERPL-MCNC: 8.8 MG/DL (ref 8.6–10.4)
CHLORIDE BLD-SCNC: 106 MMOL/L (ref 98–107)
CO2: 21 MMOL/L (ref 20–31)
CREAT SERPL-MCNC: 0.67 MG/DL (ref 0.5–0.9)
GFR AFRICAN AMERICAN: >60 ML/MIN
GFR NON-AFRICAN AMERICAN: >60 ML/MIN
GFR SERPL CREATININE-BSD FRML MDRD: ABNORMAL ML/MIN/{1.73_M2}
GFR SERPL CREATININE-BSD FRML MDRD: ABNORMAL ML/MIN/{1.73_M2}
GLUCOSE BLD-MCNC: 104 MG/DL (ref 70–99)
GLUCOSE BLD-MCNC: 279 MG/DL (ref 65–105)
HCT VFR BLD CALC: 28.2 % (ref 36.3–47.1)
HEMOGLOBIN: 8.1 G/DL (ref 11.9–15.1)
MCH RBC QN AUTO: 25.2 PG (ref 25.2–33.5)
MCHC RBC AUTO-ENTMCNC: 28.7 G/DL (ref 28.4–34.8)
MCV RBC AUTO: 87.9 FL (ref 82.6–102.9)
NRBC AUTOMATED: 0.4 PER 100 WBC
PDW BLD-RTO: 20.8 % (ref 11.8–14.4)
PLATELET # BLD: 429 K/UL (ref 138–453)
PMV BLD AUTO: 8.9 FL (ref 8.1–13.5)
POTASSIUM SERPL-SCNC: 5 MMOL/L (ref 3.7–5.3)
RBC # BLD: 3.21 M/UL (ref 3.95–5.11)
SODIUM BLD-SCNC: 138 MMOL/L (ref 135–144)
VANCOMYCIN TROUGH DATE LAST DOSE: ABNORMAL
VANCOMYCIN TROUGH DOSE AMOUNT: ABNORMAL
VANCOMYCIN TROUGH TIME LAST DOSE: ABNORMAL
VANCOMYCIN TROUGH: 6.5 UG/ML (ref 10–20)
WBC # BLD: 12.9 K/UL (ref 3.5–11.3)

## 2019-01-09 PROCEDURE — 82947 ASSAY GLUCOSE BLOOD QUANT: CPT

## 2019-01-09 PROCEDURE — 6370000000 HC RX 637 (ALT 250 FOR IP): Performed by: STUDENT IN AN ORGANIZED HEALTH CARE EDUCATION/TRAINING PROGRAM

## 2019-01-09 PROCEDURE — 94762 N-INVAS EAR/PLS OXIMTRY CONT: CPT

## 2019-01-09 PROCEDURE — 71045 X-RAY EXAM CHEST 1 VIEW: CPT

## 2019-01-09 PROCEDURE — 6360000002 HC RX W HCPCS: Performed by: STUDENT IN AN ORGANIZED HEALTH CARE EDUCATION/TRAINING PROGRAM

## 2019-01-09 PROCEDURE — 6360000002 HC RX W HCPCS: Performed by: EMERGENCY MEDICINE

## 2019-01-09 PROCEDURE — S0028 INJECTION, FAMOTIDINE, 20 MG: HCPCS | Performed by: STUDENT IN AN ORGANIZED HEALTH CARE EDUCATION/TRAINING PROGRAM

## 2019-01-09 PROCEDURE — 82803 BLOOD GASES ANY COMBINATION: CPT

## 2019-01-09 PROCEDURE — 99233 SBSQ HOSP IP/OBS HIGH 50: CPT | Performed by: INTERNAL MEDICINE

## 2019-01-09 PROCEDURE — 2500000003 HC RX 250 WO HCPCS: Performed by: EMERGENCY MEDICINE

## 2019-01-09 PROCEDURE — 6370000000 HC RX 637 (ALT 250 FOR IP): Performed by: EMERGENCY MEDICINE

## 2019-01-09 PROCEDURE — 2580000003 HC RX 258: Performed by: STUDENT IN AN ORGANIZED HEALTH CARE EDUCATION/TRAINING PROGRAM

## 2019-01-09 PROCEDURE — 85027 COMPLETE CBC AUTOMATED: CPT

## 2019-01-09 PROCEDURE — 80048 BASIC METABOLIC PNL TOTAL CA: CPT

## 2019-01-09 PROCEDURE — 2580000003 HC RX 258: Performed by: EMERGENCY MEDICINE

## 2019-01-09 PROCEDURE — 2060000000 HC ICU INTERMEDIATE R&B

## 2019-01-09 PROCEDURE — 2700000000 HC OXYGEN THERAPY PER DAY

## 2019-01-09 PROCEDURE — 94640 AIRWAY INHALATION TREATMENT: CPT

## 2019-01-09 PROCEDURE — 36415 COLL VENOUS BLD VENIPUNCTURE: CPT

## 2019-01-09 PROCEDURE — 99254 IP/OBS CNSLTJ NEW/EST MOD 60: CPT | Performed by: INTERNAL MEDICINE

## 2019-01-09 PROCEDURE — 36600 WITHDRAWAL OF ARTERIAL BLOOD: CPT

## 2019-01-09 PROCEDURE — 80202 ASSAY OF VANCOMYCIN: CPT

## 2019-01-09 RX ORDER — ANALGESIC BALM 1.74; 4.06 G/29G; G/29G
OINTMENT TOPICAL PRN
Status: DISCONTINUED | OUTPATIENT
Start: 2019-01-09 | End: 2019-01-11 | Stop reason: HOSPADM

## 2019-01-09 RX ORDER — VANCOMYCIN HYDROCHLORIDE 1 G/200ML
1000 INJECTION, SOLUTION INTRAVENOUS EVERY 12 HOURS
Status: DISCONTINUED | OUTPATIENT
Start: 2019-01-09 | End: 2019-01-10

## 2019-01-09 RX ADMIN — GABAPENTIN 200 MG: 100 CAPSULE ORAL at 08:34

## 2019-01-09 RX ADMIN — ANALGESIC BALM: 1.74; 4.06 OINTMENT TOPICAL at 16:22

## 2019-01-09 RX ADMIN — SODIUM CHLORIDE: 9 INJECTION, SOLUTION INTRAVENOUS at 16:34

## 2019-01-09 RX ADMIN — ACETAMINOPHEN 650 MG: 325 TABLET ORAL at 22:32

## 2019-01-09 RX ADMIN — AZTREONAM 2 G: 2 INJECTION, POWDER, LYOPHILIZED, FOR SOLUTION INTRAMUSCULAR; INTRAVENOUS at 05:36

## 2019-01-09 RX ADMIN — ACETAMINOPHEN 650 MG: 325 TABLET ORAL at 10:05

## 2019-01-09 RX ADMIN — GABAPENTIN 200 MG: 100 CAPSULE ORAL at 21:09

## 2019-01-09 RX ADMIN — FAMOTIDINE 20 MG: 10 INJECTION, SOLUTION INTRAVENOUS at 08:34

## 2019-01-09 RX ADMIN — IPRATROPIUM BROMIDE AND ALBUTEROL SULFATE 1 AMPULE: .5; 3 SOLUTION RESPIRATORY (INHALATION) at 17:08

## 2019-01-09 RX ADMIN — Medication 10 ML: at 21:09

## 2019-01-09 RX ADMIN — IPRATROPIUM BROMIDE AND ALBUTEROL SULFATE 1 AMPULE: .5; 3 SOLUTION RESPIRATORY (INHALATION) at 12:33

## 2019-01-09 RX ADMIN — IPRATROPIUM BROMIDE AND ALBUTEROL SULFATE 1 AMPULE: .5; 3 SOLUTION RESPIRATORY (INHALATION) at 08:59

## 2019-01-09 RX ADMIN — FAMOTIDINE 20 MG: 10 INJECTION, SOLUTION INTRAVENOUS at 21:09

## 2019-01-09 RX ADMIN — MOMETASONE FUROATE AND FORMOTEROL FUMARATE DIHYDRATE 2 PUFF: 100; 5 AEROSOL RESPIRATORY (INHALATION) at 19:47

## 2019-01-09 RX ADMIN — PREDNISONE 30 MG: 20 TABLET ORAL at 14:56

## 2019-01-09 RX ADMIN — ENOXAPARIN SODIUM 40 MG: 40 INJECTION SUBCUTANEOUS at 08:34

## 2019-01-09 RX ADMIN — ANALGESIC BALM: 1.74; 4.06 OINTMENT TOPICAL at 21:11

## 2019-01-09 RX ADMIN — BUPRENORPHINE AND NALOXONE 2 TABLET: 2; .5 TABLET SUBLINGUAL at 08:35

## 2019-01-09 RX ADMIN — VANCOMYCIN HYDROCHLORIDE 1000 MG: 1 INJECTION, SOLUTION INTRAVENOUS at 08:35

## 2019-01-09 RX ADMIN — GABAPENTIN 200 MG: 100 CAPSULE ORAL at 13:51

## 2019-01-09 RX ADMIN — Medication 10 ML: at 08:35

## 2019-01-09 RX ADMIN — ANALGESIC BALM: 1.74; 4.06 OINTMENT TOPICAL at 10:43

## 2019-01-09 RX ADMIN — VANCOMYCIN HYDROCHLORIDE 1000 MG: 1 INJECTION, SOLUTION INTRAVENOUS at 22:21

## 2019-01-09 RX ADMIN — IPRATROPIUM BROMIDE AND ALBUTEROL SULFATE 1 AMPULE: .5; 3 SOLUTION RESPIRATORY (INHALATION) at 19:47

## 2019-01-09 RX ADMIN — ACETAMINOPHEN 650 MG: 325 TABLET ORAL at 00:34

## 2019-01-09 ASSESSMENT — PAIN DESCRIPTION - PROGRESSION
CLINICAL_PROGRESSION: NOT CHANGED
CLINICAL_PROGRESSION: NOT CHANGED

## 2019-01-09 ASSESSMENT — PAIN SCALES - GENERAL
PAINLEVEL_OUTOF10: 3
PAINLEVEL_OUTOF10: 7
PAINLEVEL_OUTOF10: 8
PAINLEVEL_OUTOF10: 8

## 2019-01-10 PROBLEM — S36.200A: Status: ACTIVE | Noted: 2019-01-10

## 2019-01-10 PROBLEM — J10.1 INFLUENZA A: Status: ACTIVE | Noted: 2019-01-10

## 2019-01-10 LAB
ADENOVIRUS PCR: NOT DETECTED
ANION GAP SERPL CALCULATED.3IONS-SCNC: 9 MMOL/L (ref 9–17)
BORDETELLA PERTUSSIS PCR: NOT DETECTED
BUN BLDV-MCNC: 8 MG/DL (ref 8–23)
BUN/CREAT BLD: ABNORMAL (ref 9–20)
CALCIUM SERPL-MCNC: 8.6 MG/DL (ref 8.6–10.4)
CHLAMYDIA PNEUMONIAE BY PCR: NOT DETECTED
CHLORIDE BLD-SCNC: 108 MMOL/L (ref 98–107)
CO2: 21 MMOL/L (ref 20–31)
CORONAVIRUS 229E PCR: NOT DETECTED
CORONAVIRUS HKU1 PCR: NOT DETECTED
CORONAVIRUS NL63 PCR: NOT DETECTED
CORONAVIRUS OC43 PCR: NOT DETECTED
CREAT SERPL-MCNC: 0.56 MG/DL (ref 0.5–0.9)
GFR AFRICAN AMERICAN: >60 ML/MIN
GFR NON-AFRICAN AMERICAN: >60 ML/MIN
GFR SERPL CREATININE-BSD FRML MDRD: ABNORMAL ML/MIN/{1.73_M2}
GFR SERPL CREATININE-BSD FRML MDRD: ABNORMAL ML/MIN/{1.73_M2}
GLUCOSE BLD-MCNC: 157 MG/DL (ref 70–99)
HCT VFR BLD CALC: 31.4 % (ref 36.3–47.1)
HEMOGLOBIN: 8.5 G/DL (ref 11.9–15.1)
HUMAN METAPNEUMOVIRUS PCR: NOT DETECTED
INFLUENZA A BY PCR: DETECTED
INFLUENZA A H1 (2009) PCR: DETECTED
INFLUENZA A H1 PCR: NOT DETECTED
INFLUENZA A H3 PCR: NOT DETECTED
INFLUENZA B BY PCR: NOT DETECTED
MCH RBC QN AUTO: 24.6 PG (ref 25.2–33.5)
MCHC RBC AUTO-ENTMCNC: 27.1 G/DL (ref 28.4–34.8)
MCV RBC AUTO: 91 FL (ref 82.6–102.9)
MYCOPLASMA PNEUMONIAE PCR: NOT DETECTED
NRBC AUTOMATED: 0.5 PER 100 WBC
PARAINFLUENZA 1 PCR: NOT DETECTED
PARAINFLUENZA 2 PCR: NOT DETECTED
PARAINFLUENZA 3 PCR: NOT DETECTED
PARAINFLUENZA 4 PCR: NOT DETECTED
PDW BLD-RTO: 21 % (ref 11.8–14.4)
PLATELET # BLD: 457 K/UL (ref 138–453)
PMV BLD AUTO: 8.9 FL (ref 8.1–13.5)
POTASSIUM SERPL-SCNC: 4.5 MMOL/L (ref 3.7–5.3)
RBC # BLD: 3.45 M/UL (ref 3.95–5.11)
RESP SYNCYTIAL VIRUS PCR: NOT DETECTED
RHINO/ENTEROVIRUS PCR: NOT DETECTED
SODIUM BLD-SCNC: 138 MMOL/L (ref 135–144)
SOURCE: ABNORMAL
WBC # BLD: 10.3 K/UL (ref 3.5–11.3)

## 2019-01-10 PROCEDURE — 87633 RESP VIRUS 12-25 TARGETS: CPT

## 2019-01-10 PROCEDURE — S0028 INJECTION, FAMOTIDINE, 20 MG: HCPCS | Performed by: STUDENT IN AN ORGANIZED HEALTH CARE EDUCATION/TRAINING PROGRAM

## 2019-01-10 PROCEDURE — G8979 MOBILITY GOAL STATUS: HCPCS

## 2019-01-10 PROCEDURE — 2580000003 HC RX 258: Performed by: EMERGENCY MEDICINE

## 2019-01-10 PROCEDURE — 97162 PT EVAL MOD COMPLEX 30 MIN: CPT

## 2019-01-10 PROCEDURE — 99232 SBSQ HOSP IP/OBS MODERATE 35: CPT | Performed by: INTERNAL MEDICINE

## 2019-01-10 PROCEDURE — 6370000000 HC RX 637 (ALT 250 FOR IP): Performed by: EMERGENCY MEDICINE

## 2019-01-10 PROCEDURE — 94640 AIRWAY INHALATION TREATMENT: CPT

## 2019-01-10 PROCEDURE — 80048 BASIC METABOLIC PNL TOTAL CA: CPT

## 2019-01-10 PROCEDURE — 6370000000 HC RX 637 (ALT 250 FOR IP): Performed by: STUDENT IN AN ORGANIZED HEALTH CARE EDUCATION/TRAINING PROGRAM

## 2019-01-10 PROCEDURE — 2700000000 HC OXYGEN THERAPY PER DAY

## 2019-01-10 PROCEDURE — 36415 COLL VENOUS BLD VENIPUNCTURE: CPT

## 2019-01-10 PROCEDURE — 85027 COMPLETE CBC AUTOMATED: CPT

## 2019-01-10 PROCEDURE — 87581 M.PNEUMON DNA AMP PROBE: CPT

## 2019-01-10 PROCEDURE — 2060000000 HC ICU INTERMEDIATE R&B

## 2019-01-10 PROCEDURE — 6360000002 HC RX W HCPCS: Performed by: STUDENT IN AN ORGANIZED HEALTH CARE EDUCATION/TRAINING PROGRAM

## 2019-01-10 PROCEDURE — G8978 MOBILITY CURRENT STATUS: HCPCS

## 2019-01-10 PROCEDURE — 6370000000 HC RX 637 (ALT 250 FOR IP): Performed by: INTERNAL MEDICINE

## 2019-01-10 PROCEDURE — 87486 CHLMYD PNEUM DNA AMP PROBE: CPT

## 2019-01-10 PROCEDURE — 97530 THERAPEUTIC ACTIVITIES: CPT

## 2019-01-10 PROCEDURE — 99233 SBSQ HOSP IP/OBS HIGH 50: CPT | Performed by: INTERNAL MEDICINE

## 2019-01-10 PROCEDURE — 6360000002 HC RX W HCPCS: Performed by: EMERGENCY MEDICINE

## 2019-01-10 PROCEDURE — 87798 DETECT AGENT NOS DNA AMP: CPT

## 2019-01-10 PROCEDURE — 93308 TTE F-UP OR LMTD: CPT

## 2019-01-10 RX ORDER — THIAMINE MONONITRATE (VIT B1) 100 MG
100 TABLET ORAL DAILY
Status: DISCONTINUED | OUTPATIENT
Start: 2019-01-10 | End: 2019-01-11 | Stop reason: HOSPADM

## 2019-01-10 RX ORDER — UREA 10 %
1 LOTION (ML) TOPICAL NIGHTLY PRN
Status: DISCONTINUED | OUTPATIENT
Start: 2019-01-11 | End: 2019-01-10

## 2019-01-10 RX ORDER — LEVOFLOXACIN 750 MG/1
750 TABLET ORAL DAILY
Status: DISCONTINUED | OUTPATIENT
Start: 2019-01-10 | End: 2019-01-10

## 2019-01-10 RX ORDER — PANTOPRAZOLE SODIUM 40 MG/1
40 TABLET, DELAYED RELEASE ORAL
Status: DISCONTINUED | OUTPATIENT
Start: 2019-01-11 | End: 2019-01-11 | Stop reason: HOSPADM

## 2019-01-10 RX ORDER — CARVEDILOL 3.12 MG/1
3.12 TABLET ORAL 2 TIMES DAILY WITH MEALS
Status: DISCONTINUED | OUTPATIENT
Start: 2019-01-10 | End: 2019-01-11 | Stop reason: HOSPADM

## 2019-01-10 RX ORDER — LEVOFLOXACIN 750 MG/1
750 TABLET ORAL DAILY
Status: DISCONTINUED | OUTPATIENT
Start: 2019-01-10 | End: 2019-01-11

## 2019-01-10 RX ORDER — OSELTAMIVIR PHOSPHATE 75 MG/1
75 CAPSULE ORAL 2 TIMES DAILY
Status: DISCONTINUED | OUTPATIENT
Start: 2019-01-10 | End: 2019-01-11 | Stop reason: HOSPADM

## 2019-01-10 RX ORDER — M-VIT,TX,IRON,MINS/CALC/FOLIC 27MG-0.4MG
1 TABLET ORAL DAILY
Status: DISCONTINUED | OUTPATIENT
Start: 2019-01-10 | End: 2019-01-11 | Stop reason: HOSPADM

## 2019-01-10 RX ORDER — FOLIC ACID 1 MG/1
1 TABLET ORAL DAILY
Status: DISCONTINUED | OUTPATIENT
Start: 2019-01-10 | End: 2019-01-11 | Stop reason: HOSPADM

## 2019-01-10 RX ORDER — CHOLECALCIFEROL (VITAMIN D3) 125 MCG
250 CAPSULE ORAL DAILY
Status: DISCONTINUED | OUTPATIENT
Start: 2019-01-10 | End: 2019-01-11 | Stop reason: HOSPADM

## 2019-01-10 RX ORDER — UREA 10 %
1 LOTION (ML) TOPICAL NIGHTLY PRN
Status: DISCONTINUED | OUTPATIENT
Start: 2019-01-10 | End: 2019-01-11 | Stop reason: HOSPADM

## 2019-01-10 RX ADMIN — OSELTAMIVIR PHOSPHATE 75 MG: 75 CAPSULE ORAL at 20:28

## 2019-01-10 RX ADMIN — BUPRENORPHINE AND NALOXONE 2 TABLET: 2; .5 TABLET SUBLINGUAL at 10:44

## 2019-01-10 RX ADMIN — IPRATROPIUM BROMIDE AND ALBUTEROL SULFATE 1 AMPULE: .5; 3 SOLUTION RESPIRATORY (INHALATION) at 07:57

## 2019-01-10 RX ADMIN — Medication 250 MCG: at 14:32

## 2019-01-10 RX ADMIN — ENOXAPARIN SODIUM 40 MG: 40 INJECTION SUBCUTANEOUS at 09:48

## 2019-01-10 RX ADMIN — VANCOMYCIN HYDROCHLORIDE 1000 MG: 1 INJECTION, SOLUTION INTRAVENOUS at 10:21

## 2019-01-10 RX ADMIN — FAMOTIDINE 20 MG: 10 INJECTION, SOLUTION INTRAVENOUS at 09:48

## 2019-01-10 RX ADMIN — IPRATROPIUM BROMIDE AND ALBUTEROL SULFATE 1 AMPULE: .5; 3 SOLUTION RESPIRATORY (INHALATION) at 16:52

## 2019-01-10 RX ADMIN — Medication 1 MG: at 21:28

## 2019-01-10 RX ADMIN — PREDNISONE 30 MG: 20 TABLET ORAL at 09:48

## 2019-01-10 RX ADMIN — ACETAMINOPHEN 650 MG: 325 TABLET ORAL at 22:56

## 2019-01-10 RX ADMIN — Medication 10 ML: at 20:29

## 2019-01-10 RX ADMIN — MOMETASONE FUROATE AND FORMOTEROL FUMARATE DIHYDRATE 2 PUFF: 100; 5 AEROSOL RESPIRATORY (INHALATION) at 19:49

## 2019-01-10 RX ADMIN — Medication 10 ML: at 09:49

## 2019-01-10 RX ADMIN — Medication 10 ML: at 09:48

## 2019-01-10 RX ADMIN — MOMETASONE FUROATE AND FORMOTEROL FUMARATE DIHYDRATE 2 PUFF: 100; 5 AEROSOL RESPIRATORY (INHALATION) at 07:56

## 2019-01-10 RX ADMIN — CARVEDILOL 3.12 MG: 3.12 TABLET, FILM COATED ORAL at 18:12

## 2019-01-10 RX ADMIN — OSELTAMIVIR PHOSPHATE 75 MG: 75 CAPSULE ORAL at 14:29

## 2019-01-10 RX ADMIN — Medication 100 MG: at 14:30

## 2019-01-10 RX ADMIN — GABAPENTIN 200 MG: 100 CAPSULE ORAL at 14:31

## 2019-01-10 RX ADMIN — MULTIPLE VITAMINS W/ MINERALS TAB 1 TABLET: TAB at 14:29

## 2019-01-10 RX ADMIN — GABAPENTIN 200 MG: 100 CAPSULE ORAL at 20:28

## 2019-01-10 RX ADMIN — IPRATROPIUM BROMIDE AND ALBUTEROL SULFATE 1 AMPULE: .5; 3 SOLUTION RESPIRATORY (INHALATION) at 19:49

## 2019-01-10 RX ADMIN — FOLIC ACID 1 MG: 1 TABLET ORAL at 14:30

## 2019-01-10 RX ADMIN — IPRATROPIUM BROMIDE AND ALBUTEROL SULFATE 1 AMPULE: .5; 3 SOLUTION RESPIRATORY (INHALATION) at 11:52

## 2019-01-10 RX ADMIN — LEVOFLOXACIN 750 MG: 750 TABLET, FILM COATED ORAL at 15:53

## 2019-01-10 RX ADMIN — GABAPENTIN 200 MG: 100 CAPSULE ORAL at 09:48

## 2019-01-10 ASSESSMENT — PAIN SCALES - GENERAL
PAINLEVEL_OUTOF10: 3
PAINLEVEL_OUTOF10: 7
PAINLEVEL_OUTOF10: 3

## 2019-01-11 VITALS
TEMPERATURE: 99.9 F | BODY MASS INDEX: 27.92 KG/M2 | WEIGHT: 142.2 LBS | OXYGEN SATURATION: 98 % | SYSTOLIC BLOOD PRESSURE: 132 MMHG | HEIGHT: 60 IN | HEART RATE: 101 BPM | DIASTOLIC BLOOD PRESSURE: 69 MMHG | RESPIRATION RATE: 20 BRPM

## 2019-01-11 LAB
ANION GAP SERPL CALCULATED.3IONS-SCNC: 11 MMOL/L (ref 9–17)
BUN BLDV-MCNC: 7 MG/DL (ref 8–23)
BUN/CREAT BLD: ABNORMAL (ref 9–20)
CALCIUM SERPL-MCNC: 9 MG/DL (ref 8.6–10.4)
CHLORIDE BLD-SCNC: 103 MMOL/L (ref 98–107)
CO2: 23 MMOL/L (ref 20–31)
CREAT SERPL-MCNC: 0.51 MG/DL (ref 0.5–0.9)
CULTURE: ABNORMAL
GFR AFRICAN AMERICAN: >60 ML/MIN
GFR NON-AFRICAN AMERICAN: >60 ML/MIN
GFR SERPL CREATININE-BSD FRML MDRD: ABNORMAL ML/MIN/{1.73_M2}
GFR SERPL CREATININE-BSD FRML MDRD: ABNORMAL ML/MIN/{1.73_M2}
GLUCOSE BLD-MCNC: 81 MG/DL (ref 70–99)
HCT VFR BLD CALC: 30.5 % (ref 36.3–47.1)
HEMOGLOBIN: 8.6 G/DL (ref 11.9–15.1)
Lab: ABNORMAL
MCH RBC QN AUTO: 24.6 PG (ref 25.2–33.5)
MCHC RBC AUTO-ENTMCNC: 28.2 G/DL (ref 28.4–34.8)
MCV RBC AUTO: 87.1 FL (ref 82.6–102.9)
NRBC AUTOMATED: 0.7 PER 100 WBC
PDW BLD-RTO: 20.8 % (ref 11.8–14.4)
PLATELET # BLD: 463 K/UL (ref 138–453)
PMV BLD AUTO: 9.9 FL (ref 8.1–13.5)
POTASSIUM SERPL-SCNC: 4.4 MMOL/L (ref 3.7–5.3)
RBC # BLD: 3.5 M/UL (ref 3.95–5.11)
SODIUM BLD-SCNC: 137 MMOL/L (ref 135–144)
SPECIMEN DESCRIPTION: ABNORMAL
STATUS: ABNORMAL
WBC # BLD: 9 K/UL (ref 3.5–11.3)

## 2019-01-11 PROCEDURE — 36415 COLL VENOUS BLD VENIPUNCTURE: CPT

## 2019-01-11 PROCEDURE — 99239 HOSP IP/OBS DSCHRG MGMT >30: CPT | Performed by: INTERNAL MEDICINE

## 2019-01-11 PROCEDURE — 99232 SBSQ HOSP IP/OBS MODERATE 35: CPT | Performed by: INTERNAL MEDICINE

## 2019-01-11 PROCEDURE — 6370000000 HC RX 637 (ALT 250 FOR IP): Performed by: STUDENT IN AN ORGANIZED HEALTH CARE EDUCATION/TRAINING PROGRAM

## 2019-01-11 PROCEDURE — 94762 N-INVAS EAR/PLS OXIMTRY CONT: CPT

## 2019-01-11 PROCEDURE — 6370000000 HC RX 637 (ALT 250 FOR IP): Performed by: EMERGENCY MEDICINE

## 2019-01-11 PROCEDURE — 80048 BASIC METABOLIC PNL TOTAL CA: CPT

## 2019-01-11 PROCEDURE — 2700000000 HC OXYGEN THERAPY PER DAY

## 2019-01-11 PROCEDURE — 94640 AIRWAY INHALATION TREATMENT: CPT

## 2019-01-11 PROCEDURE — 6360000002 HC RX W HCPCS: Performed by: EMERGENCY MEDICINE

## 2019-01-11 PROCEDURE — 97530 THERAPEUTIC ACTIVITIES: CPT

## 2019-01-11 PROCEDURE — 85027 COMPLETE CBC AUTOMATED: CPT

## 2019-01-11 PROCEDURE — 6370000000 HC RX 637 (ALT 250 FOR IP): Performed by: INTERNAL MEDICINE

## 2019-01-11 RX ORDER — PREDNISONE 1 MG/1
10 TABLET ORAL DAILY
Qty: 6 TABLET | Refills: 0 | Status: SHIPPED | OUTPATIENT
Start: 2019-01-19 | End: 2019-01-11

## 2019-01-11 RX ORDER — ONDANSETRON 4 MG/1
4 TABLET, ORALLY DISINTEGRATING ORAL EVERY 8 HOURS PRN
Qty: 10 TABLET | Refills: 0 | Status: ON HOLD | OUTPATIENT
Start: 2019-01-11 | End: 2019-05-12 | Stop reason: ALTCHOICE

## 2019-01-11 RX ORDER — OSELTAMIVIR PHOSPHATE 75 MG/1
75 CAPSULE ORAL 2 TIMES DAILY
Qty: 7 CAPSULE | Refills: 0 | Status: SHIPPED | OUTPATIENT
Start: 2019-01-11 | End: 2019-01-15

## 2019-01-11 RX ORDER — PREDNISONE 10 MG/1
10 TABLET ORAL DAILY
Qty: 3 TABLET | Refills: 0 | Status: SHIPPED | OUTPATIENT
Start: 2019-01-19 | End: 2019-01-22

## 2019-01-11 RX ORDER — IPRATROPIUM BROMIDE AND ALBUTEROL SULFATE 2.5; .5 MG/3ML; MG/3ML
1 SOLUTION RESPIRATORY (INHALATION) EVERY 4 HOURS PRN
Qty: 360 ML | Refills: 5 | Status: SHIPPED | OUTPATIENT
Start: 2019-01-11

## 2019-01-11 RX ORDER — PREDNISONE 10 MG/1
20 TABLET ORAL DAILY
Qty: 6 TABLET | Refills: 0 | Status: SHIPPED | OUTPATIENT
Start: 2019-01-15 | End: 2019-01-18

## 2019-01-11 RX ORDER — PREDNISONE 10 MG/1
30 TABLET ORAL DAILY
Qty: 9 TABLET | Refills: 0 | Status: SHIPPED | OUTPATIENT
Start: 2019-01-12 | End: 2019-01-15

## 2019-01-11 RX ORDER — UREA 10 %
1 LOTION (ML) TOPICAL NIGHTLY PRN
Qty: 20 TABLET | Refills: 0 | Status: SHIPPED | OUTPATIENT
Start: 2019-01-11 | End: 2021-04-21

## 2019-01-11 RX ORDER — LEVOFLOXACIN 750 MG/1
750 TABLET ORAL DAILY
Qty: 3 TABLET | Refills: 0 | Status: SHIPPED | OUTPATIENT
Start: 2019-01-12 | End: 2019-01-15

## 2019-01-11 RX ADMIN — Medication 250 MCG: at 09:02

## 2019-01-11 RX ADMIN — OSELTAMIVIR PHOSPHATE 75 MG: 75 CAPSULE ORAL at 09:02

## 2019-01-11 RX ADMIN — IPRATROPIUM BROMIDE AND ALBUTEROL SULFATE 1 AMPULE: .5; 3 SOLUTION RESPIRATORY (INHALATION) at 12:21

## 2019-01-11 RX ADMIN — MULTIPLE VITAMINS W/ MINERALS TAB 1 TABLET: TAB at 09:02

## 2019-01-11 RX ADMIN — PREDNISONE 30 MG: 20 TABLET ORAL at 09:02

## 2019-01-11 RX ADMIN — GABAPENTIN 200 MG: 100 CAPSULE ORAL at 09:02

## 2019-01-11 RX ADMIN — CARVEDILOL 3.12 MG: 3.12 TABLET, FILM COATED ORAL at 09:02

## 2019-01-11 RX ADMIN — IPRATROPIUM BROMIDE AND ALBUTEROL SULFATE 1 AMPULE: .5; 3 SOLUTION RESPIRATORY (INHALATION) at 08:10

## 2019-01-11 RX ADMIN — LEVOFLOXACIN 750 MG: 750 TABLET, FILM COATED ORAL at 09:02

## 2019-01-11 RX ADMIN — FOLIC ACID 1 MG: 1 TABLET ORAL at 09:02

## 2019-01-11 RX ADMIN — PANTOPRAZOLE SODIUM 40 MG: 40 TABLET, DELAYED RELEASE ORAL at 09:03

## 2019-01-11 RX ADMIN — BENZOCAINE, MENTHOL 1 LOZENGE: 15; 3.6 LOZENGE ORAL at 01:32

## 2019-01-11 RX ADMIN — MOMETASONE FUROATE AND FORMOTEROL FUMARATE DIHYDRATE 2 PUFF: 100; 5 AEROSOL RESPIRATORY (INHALATION) at 08:11

## 2019-01-11 RX ADMIN — BUPRENORPHINE AND NALOXONE 2 TABLET: 2; .5 TABLET SUBLINGUAL at 09:44

## 2019-01-11 RX ADMIN — Medication 100 MG: at 09:02

## 2019-01-11 RX ADMIN — IPRATROPIUM BROMIDE AND ALBUTEROL SULFATE 1 AMPULE: .5; 3 SOLUTION RESPIRATORY (INHALATION) at 16:03

## 2019-01-11 ASSESSMENT — PAIN SCALES - GENERAL: PAINLEVEL_OUTOF10: 2

## 2019-01-13 LAB
CULTURE: ABNORMAL
CULTURE: NORMAL
Lab: ABNORMAL
Lab: NORMAL
SPECIMEN DESCRIPTION: ABNORMAL
SPECIMEN DESCRIPTION: NORMAL
STATUS: ABNORMAL
STATUS: NORMAL

## 2019-01-14 PROBLEM — J10.1 INFLUENZA A: Status: RESOLVED | Noted: 2019-01-10 | Resolved: 2019-01-14

## 2019-02-04 ENCOUNTER — HOSPITAL ENCOUNTER (INPATIENT)
Age: 64
LOS: 3 days | Discharge: HOME | DRG: 140 | End: 2019-02-07
Attending: EMERGENCY MEDICINE | Admitting: INTERNAL MEDICINE
Payer: MEDICARE

## 2019-02-04 ENCOUNTER — APPOINTMENT (OUTPATIENT)
Dept: GENERAL RADIOLOGY | Age: 64
DRG: 140 | End: 2019-02-04
Payer: MEDICARE

## 2019-02-04 DIAGNOSIS — A41.9 SEPSIS, DUE TO UNSPECIFIED ORGANISM: Primary | ICD-10-CM

## 2019-02-04 LAB
ABSOLUTE EOS #: 0 K/UL (ref 0–0.4)
ABSOLUTE IMMATURE GRANULOCYTE: 0.45 K/UL (ref 0–0.3)
ABSOLUTE LYMPH #: 5.2 K/UL (ref 1–4.8)
ABSOLUTE MONO #: 1.36 K/UL (ref 0.1–0.8)
ALBUMIN SERPL-MCNC: 3.4 G/DL (ref 3.5–5.2)
ALBUMIN/GLOBULIN RATIO: 0.9 (ref 1–2.5)
ALLEN TEST: ABNORMAL
ALP BLD-CCNC: 88 U/L (ref 35–104)
ALT SERPL-CCNC: 17 U/L (ref 5–33)
AMMONIA: 49 UMOL/L (ref 11–51)
ANION GAP SERPL CALCULATED.3IONS-SCNC: 17 MMOL/L (ref 9–17)
ANION GAP: 11 MMOL/L (ref 7–16)
AST SERPL-CCNC: 31 U/L
BASOPHILS # BLD: 0 % (ref 0–2)
BASOPHILS ABSOLUTE: 0 K/UL (ref 0–0.2)
BILIRUB SERPL-MCNC: 0.55 MG/DL (ref 0.3–1.2)
BUN BLDV-MCNC: 10 MG/DL (ref 8–23)
BUN/CREAT BLD: ABNORMAL (ref 9–20)
CALCIUM SERPL-MCNC: 8.9 MG/DL (ref 8.6–10.4)
CHLORIDE BLD-SCNC: 104 MMOL/L (ref 98–107)
CO2: 14 MMOL/L (ref 20–31)
CREAT SERPL-MCNC: 0.85 MG/DL (ref 0.5–0.9)
DIFFERENTIAL TYPE: ABNORMAL
EKG ATRIAL RATE: 123 BPM
EKG P-R INTERVAL: 176 MS
EKG Q-T INTERVAL: 294 MS
EKG QRS DURATION: 62 MS
EKG QTC CALCULATION (BAZETT): 420 MS
EKG R AXIS: 144 DEGREES
EKG T AXIS: 146 DEGREES
EKG VENTRICULAR RATE: 123 BPM
EOSINOPHILS RELATIVE PERCENT: 0 % (ref 1–4)
FERRITIN: 127 UG/L (ref 13–150)
FIO2: ABNORMAL
GFR AFRICAN AMERICAN: >60 ML/MIN
GFR NON-AFRICAN AMERICAN: >60 ML/MIN
GFR NON-AFRICAN AMERICAN: >60 ML/MIN
GFR SERPL CREATININE-BSD FRML MDRD: >60 ML/MIN
GFR SERPL CREATININE-BSD FRML MDRD: ABNORMAL ML/MIN/{1.73_M2}
GFR SERPL CREATININE-BSD FRML MDRD: ABNORMAL ML/MIN/{1.73_M2}
GFR SERPL CREATININE-BSD FRML MDRD: NORMAL ML/MIN/{1.73_M2}
GLUCOSE BLD-MCNC: 120 MG/DL (ref 70–99)
GLUCOSE BLD-MCNC: 126 MG/DL (ref 74–100)
HCO3 VENOUS: 20.2 MMOL/L (ref 22–29)
HCT VFR BLD CALC: 33.8 % (ref 36.3–47.1)
HEMOGLOBIN: 9.8 G/DL (ref 11.9–15.1)
IMMATURE GRANULOCYTES: 2 %
INR BLD: 1
INR BLD: >16.9
IRON SATURATION: 6 % (ref 20–55)
IRON: 19 UG/DL (ref 37–145)
LACTIC ACID, SEPSIS WHOLE BLOOD: 0.7 MMOL/L (ref 0.5–1.9)
LACTIC ACID, SEPSIS WHOLE BLOOD: 4.3 MMOL/L (ref 0.5–1.9)
LACTIC ACID, SEPSIS: ABNORMAL MMOL/L (ref 0.5–1.9)
LACTIC ACID, SEPSIS: NORMAL MMOL/L (ref 0.5–1.9)
LYMPHOCYTES # BLD: 23 % (ref 24–44)
MAGNESIUM: 2.2 MG/DL (ref 1.6–2.6)
MCH RBC QN AUTO: 24.9 PG (ref 25.2–33.5)
MCHC RBC AUTO-ENTMCNC: 29 G/DL (ref 28.4–34.8)
MCV RBC AUTO: 85.8 FL (ref 82.6–102.9)
MODE: ABNORMAL
MONOCYTES # BLD: 6 % (ref 1–7)
MORPHOLOGY: ABNORMAL
NEGATIVE BASE EXCESS, VEN: 6 (ref 0–2)
NRBC AUTOMATED: 1.3 PER 100 WBC
O2 DEVICE/FLOW/%: ABNORMAL
O2 SAT, VEN: 98 % (ref 60–85)
PATIENT TEMP: ABNORMAL
PCO2, VEN: 39.3 MM HG (ref 41–51)
PDW BLD-RTO: 22.2 % (ref 11.8–14.4)
PH VENOUS: 7.32 (ref 7.32–7.43)
PLATELET # BLD: 526 K/UL (ref 138–453)
PLATELET ESTIMATE: ABNORMAL
PMV BLD AUTO: 8.9 FL (ref 8.1–13.5)
PO2, VEN: 124.6 MM HG (ref 30–50)
POC CHLORIDE: 107 MMOL/L (ref 98–107)
POC CREATININE: 0.87 MG/DL (ref 0.51–1.19)
POC HEMATOCRIT: 36 % (ref 36–46)
POC HEMOGLOBIN: 12.3 G/DL (ref 12–16)
POC IONIZED CALCIUM: 1.21 MMOL/L (ref 1.15–1.33)
POC LACTIC ACID: 4.29 MMOL/L (ref 0.56–1.39)
POC PCO2 TEMP: ABNORMAL MM HG
POC PH TEMP: ABNORMAL
POC PO2 TEMP: ABNORMAL MM HG
POC POTASSIUM: 5 MMOL/L (ref 3.5–4.5)
POC SODIUM: 138 MMOL/L (ref 138–146)
POSITIVE BASE EXCESS, VEN: ABNORMAL (ref 0–3)
POTASSIUM SERPL-SCNC: 5.2 MMOL/L (ref 3.7–5.3)
PROTHROMBIN TIME: 10.7 SEC (ref 9–12)
PROTHROMBIN TIME: >150 SEC (ref 9–12)
RBC # BLD: 3.94 M/UL (ref 3.95–5.11)
RBC # BLD: ABNORMAL 10*6/UL
SAMPLE SITE: ABNORMAL
SEG NEUTROPHILS: 69 % (ref 36–66)
SEGMENTED NEUTROPHILS ABSOLUTE COUNT: 15.59 K/UL (ref 1.8–7.7)
SODIUM BLD-SCNC: 135 MMOL/L (ref 135–144)
TOTAL CO2, VENOUS: 21 MMOL/L (ref 23–30)
TOTAL IRON BINDING CAPACITY: 306 UG/DL (ref 250–450)
TOTAL PROTEIN: 7.4 G/DL (ref 6.4–8.3)
TROPONIN INTERP: ABNORMAL
TROPONIN T: ABNORMAL NG/ML
TROPONIN, HIGH SENSITIVITY: 27 NG/L (ref 0–14)
TROPONIN, HIGH SENSITIVITY: 30 NG/L (ref 0–14)
TROPONIN, HIGH SENSITIVITY: 32 NG/L (ref 0–14)
UNSATURATED IRON BINDING CAPACITY: 287 UG/DL (ref 112–347)
WBC # BLD: 22.6 K/UL (ref 3.5–11.3)
WBC # BLD: ABNORMAL 10*3/UL

## 2019-02-04 PROCEDURE — 80053 COMPREHEN METABOLIC PANEL: CPT

## 2019-02-04 PROCEDURE — 82607 VITAMIN B-12: CPT

## 2019-02-04 PROCEDURE — 82565 ASSAY OF CREATININE: CPT

## 2019-02-04 PROCEDURE — 94762 N-INVAS EAR/PLS OXIMTRY CONT: CPT

## 2019-02-04 PROCEDURE — 87804 INFLUENZA ASSAY W/OPTIC: CPT

## 2019-02-04 PROCEDURE — 84295 ASSAY OF SERUM SODIUM: CPT

## 2019-02-04 PROCEDURE — 83605 ASSAY OF LACTIC ACID: CPT

## 2019-02-04 PROCEDURE — 87040 BLOOD CULTURE FOR BACTERIA: CPT

## 2019-02-04 PROCEDURE — 85610 PROTHROMBIN TIME: CPT

## 2019-02-04 PROCEDURE — 6360000002 HC RX W HCPCS: Performed by: STUDENT IN AN ORGANIZED HEALTH CARE EDUCATION/TRAINING PROGRAM

## 2019-02-04 PROCEDURE — 85025 COMPLETE CBC W/AUTO DIFF WBC: CPT

## 2019-02-04 PROCEDURE — 6360000002 HC RX W HCPCS: Performed by: EMERGENCY MEDICINE

## 2019-02-04 PROCEDURE — 2580000003 HC RX 258: Performed by: STUDENT IN AN ORGANIZED HEALTH CARE EDUCATION/TRAINING PROGRAM

## 2019-02-04 PROCEDURE — 82435 ASSAY OF BLOOD CHLORIDE: CPT

## 2019-02-04 PROCEDURE — 82330 ASSAY OF CALCIUM: CPT

## 2019-02-04 PROCEDURE — 2700000000 HC OXYGEN THERAPY PER DAY

## 2019-02-04 PROCEDURE — 83540 ASSAY OF IRON: CPT

## 2019-02-04 PROCEDURE — 36556 INSERT NON-TUNNEL CV CATH: CPT

## 2019-02-04 PROCEDURE — 82728 ASSAY OF FERRITIN: CPT

## 2019-02-04 PROCEDURE — 84132 ASSAY OF SERUM POTASSIUM: CPT

## 2019-02-04 PROCEDURE — 82140 ASSAY OF AMMONIA: CPT

## 2019-02-04 PROCEDURE — 93005 ELECTROCARDIOGRAM TRACING: CPT

## 2019-02-04 PROCEDURE — 2580000003 HC RX 258: Performed by: EMERGENCY MEDICINE

## 2019-02-04 PROCEDURE — 82947 ASSAY GLUCOSE BLOOD QUANT: CPT

## 2019-02-04 PROCEDURE — 83735 ASSAY OF MAGNESIUM: CPT

## 2019-02-04 PROCEDURE — 96365 THER/PROPH/DIAG IV INF INIT: CPT

## 2019-02-04 PROCEDURE — 71045 X-RAY EXAM CHEST 1 VIEW: CPT

## 2019-02-04 PROCEDURE — 2060000000 HC ICU INTERMEDIATE R&B

## 2019-02-04 PROCEDURE — 36415 COLL VENOUS BLD VENIPUNCTURE: CPT

## 2019-02-04 PROCEDURE — 86738 MYCOPLASMA ANTIBODY: CPT

## 2019-02-04 PROCEDURE — 85014 HEMATOCRIT: CPT

## 2019-02-04 PROCEDURE — 82803 BLOOD GASES ANY COMBINATION: CPT

## 2019-02-04 PROCEDURE — 83550 IRON BINDING TEST: CPT

## 2019-02-04 PROCEDURE — 80307 DRUG TEST PRSMV CHEM ANLYZR: CPT

## 2019-02-04 PROCEDURE — 84484 ASSAY OF TROPONIN QUANT: CPT

## 2019-02-04 PROCEDURE — 99285 EMERGENCY DEPT VISIT HI MDM: CPT

## 2019-02-04 PROCEDURE — 84145 PROCALCITONIN (PCT): CPT

## 2019-02-04 PROCEDURE — 82746 ASSAY OF FOLIC ACID SERUM: CPT

## 2019-02-04 PROCEDURE — 6370000000 HC RX 637 (ALT 250 FOR IP): Performed by: STUDENT IN AN ORGANIZED HEALTH CARE EDUCATION/TRAINING PROGRAM

## 2019-02-04 RX ORDER — VANCOMYCIN HYDROCHLORIDE 1 G/200ML
1000 INJECTION, SOLUTION INTRAVENOUS EVERY 24 HOURS
Status: DISCONTINUED | OUTPATIENT
Start: 2019-02-05 | End: 2019-02-06

## 2019-02-04 RX ORDER — IPRATROPIUM BROMIDE AND ALBUTEROL SULFATE 2.5; .5 MG/3ML; MG/3ML
1 SOLUTION RESPIRATORY (INHALATION) EVERY 4 HOURS PRN
Status: DISCONTINUED | OUTPATIENT
Start: 2019-02-04 | End: 2019-02-07 | Stop reason: HOSPADM

## 2019-02-04 RX ORDER — SODIUM CHLORIDE 0.9 % (FLUSH) 0.9 %
10 SYRINGE (ML) INJECTION EVERY 12 HOURS SCHEDULED
Status: DISCONTINUED | OUTPATIENT
Start: 2019-02-04 | End: 2019-02-07 | Stop reason: HOSPADM

## 2019-02-04 RX ORDER — LEVOFLOXACIN 5 MG/ML
500 INJECTION, SOLUTION INTRAVENOUS EVERY 24 HOURS
Status: DISCONTINUED | OUTPATIENT
Start: 2019-02-05 | End: 2019-02-06

## 2019-02-04 RX ORDER — DOCUSATE SODIUM 100 MG/1
100 CAPSULE, LIQUID FILLED ORAL 2 TIMES DAILY
Status: DISCONTINUED | OUTPATIENT
Start: 2019-02-04 | End: 2019-02-07 | Stop reason: HOSPADM

## 2019-02-04 RX ORDER — THIAMINE MONONITRATE (VIT B1) 100 MG
100 TABLET ORAL DAILY
Status: DISCONTINUED | OUTPATIENT
Start: 2019-02-05 | End: 2019-02-07 | Stop reason: HOSPADM

## 2019-02-04 RX ORDER — VANCOMYCIN HYDROCHLORIDE 1 G/200ML
15 INJECTION, SOLUTION INTRAVENOUS ONCE
Status: COMPLETED | OUTPATIENT
Start: 2019-02-04 | End: 2019-02-04

## 2019-02-04 RX ORDER — ALBUTEROL SULFATE 2.5 MG/3ML
2.5 SOLUTION RESPIRATORY (INHALATION)
Status: DISCONTINUED | OUTPATIENT
Start: 2019-02-05 | End: 2019-02-05

## 2019-02-04 RX ORDER — LEVOFLOXACIN 5 MG/ML
750 INJECTION, SOLUTION INTRAVENOUS ONCE
Status: COMPLETED | OUTPATIENT
Start: 2019-02-04 | End: 2019-02-04

## 2019-02-04 RX ORDER — SODIUM CHLORIDE 0.9 % (FLUSH) 0.9 %
10 SYRINGE (ML) INJECTION EVERY 12 HOURS SCHEDULED
Status: CANCELLED | OUTPATIENT
Start: 2019-02-04

## 2019-02-04 RX ORDER — SODIUM CHLORIDE 0.9 % (FLUSH) 0.9 %
10 SYRINGE (ML) INJECTION PRN
Status: CANCELLED | OUTPATIENT
Start: 2019-02-04

## 2019-02-04 RX ORDER — ACETAMINOPHEN 325 MG/1
650 TABLET ORAL EVERY 4 HOURS PRN
Status: CANCELLED | OUTPATIENT
Start: 2019-02-04

## 2019-02-04 RX ORDER — GUAIFENESIN 600 MG/1
600 TABLET, EXTENDED RELEASE ORAL 2 TIMES DAILY
Status: DISCONTINUED | OUTPATIENT
Start: 2019-02-04 | End: 2019-02-07 | Stop reason: HOSPADM

## 2019-02-04 RX ORDER — FOLIC ACID 1 MG/1
1 TABLET ORAL DAILY
Status: DISCONTINUED | OUTPATIENT
Start: 2019-02-05 | End: 2019-02-07 | Stop reason: HOSPADM

## 2019-02-04 RX ORDER — GABAPENTIN 300 MG/1
300 CAPSULE ORAL 3 TIMES DAILY
Status: DISCONTINUED | OUTPATIENT
Start: 2019-02-04 | End: 2019-02-07 | Stop reason: HOSPADM

## 2019-02-04 RX ORDER — SODIUM CHLORIDE 0.9 % (FLUSH) 0.9 %
10 SYRINGE (ML) INJECTION PRN
Status: DISCONTINUED | OUTPATIENT
Start: 2019-02-04 | End: 2019-02-07 | Stop reason: HOSPADM

## 2019-02-04 RX ORDER — IPRATROPIUM BROMIDE AND ALBUTEROL SULFATE 2.5; .5 MG/3ML; MG/3ML
1 SOLUTION RESPIRATORY (INHALATION)
Status: DISCONTINUED | OUTPATIENT
Start: 2019-02-05 | End: 2019-02-05

## 2019-02-04 RX ORDER — CHOLECALCIFEROL (VITAMIN D3) 125 MCG
250 CAPSULE ORAL DAILY
Status: DISCONTINUED | OUTPATIENT
Start: 2019-02-05 | End: 2019-02-07 | Stop reason: HOSPADM

## 2019-02-04 RX ORDER — UREA 10 %
1 LOTION (ML) TOPICAL NIGHTLY PRN
Status: DISCONTINUED | OUTPATIENT
Start: 2019-02-04 | End: 2019-02-07 | Stop reason: HOSPADM

## 2019-02-04 RX ORDER — PANTOPRAZOLE SODIUM 40 MG/1
40 TABLET, DELAYED RELEASE ORAL
Status: DISCONTINUED | OUTPATIENT
Start: 2019-02-05 | End: 2019-02-07 | Stop reason: HOSPADM

## 2019-02-04 RX ORDER — BUPRENORPHINE HYDROCHLORIDE AND NALOXONE HYDROCHLORIDE DIHYDRATE 8; 2 MG/1; MG/1
1 TABLET SUBLINGUAL DAILY
Status: DISCONTINUED | OUTPATIENT
Start: 2019-02-05 | End: 2019-02-07 | Stop reason: HOSPADM

## 2019-02-04 RX ORDER — METHYLPREDNISOLONE SODIUM SUCCINATE 40 MG/ML
40 INJECTION, POWDER, LYOPHILIZED, FOR SOLUTION INTRAMUSCULAR; INTRAVENOUS EVERY 8 HOURS
Status: DISCONTINUED | OUTPATIENT
Start: 2019-02-04 | End: 2019-02-06

## 2019-02-04 RX ORDER — 0.9 % SODIUM CHLORIDE 0.9 %
30 INTRAVENOUS SOLUTION INTRAVENOUS ONCE
Status: COMPLETED | OUTPATIENT
Start: 2019-02-04 | End: 2019-02-04

## 2019-02-04 RX ORDER — M-VIT,TX,IRON,MINS/CALC/FOLIC 27MG-0.4MG
1 TABLET ORAL DAILY
Status: DISCONTINUED | OUTPATIENT
Start: 2019-02-05 | End: 2019-02-07 | Stop reason: HOSPADM

## 2019-02-04 RX ORDER — ACETAMINOPHEN 325 MG/1
650 TABLET ORAL EVERY 4 HOURS PRN
Status: DISCONTINUED | OUTPATIENT
Start: 2019-02-04 | End: 2019-02-07 | Stop reason: HOSPADM

## 2019-02-04 RX ORDER — SODIUM CHLORIDE 9 MG/ML
INJECTION, SOLUTION INTRAVENOUS CONTINUOUS
Status: DISCONTINUED | OUTPATIENT
Start: 2019-02-04 | End: 2019-02-07 | Stop reason: HOSPADM

## 2019-02-04 RX ORDER — ONDANSETRON 2 MG/ML
4 INJECTION INTRAMUSCULAR; INTRAVENOUS EVERY 6 HOURS PRN
Status: DISCONTINUED | OUTPATIENT
Start: 2019-02-04 | End: 2019-02-07 | Stop reason: HOSPADM

## 2019-02-04 RX ORDER — ALBUTEROL SULFATE 90 UG/1
2 AEROSOL, METERED RESPIRATORY (INHALATION) EVERY 4 HOURS PRN
Status: DISCONTINUED | OUTPATIENT
Start: 2019-02-04 | End: 2019-02-05

## 2019-02-04 RX ORDER — SODIUM CHLORIDE 9 MG/ML
INJECTION, SOLUTION INTRAVENOUS CONTINUOUS
Status: CANCELLED | OUTPATIENT
Start: 2019-02-04

## 2019-02-04 RX ADMIN — VANCOMYCIN HYDROCHLORIDE 1000 MG: 1 INJECTION, SOLUTION INTRAVENOUS at 15:45

## 2019-02-04 RX ADMIN — SODIUM CHLORIDE: 9 INJECTION, SOLUTION INTRAVENOUS at 22:45

## 2019-02-04 RX ADMIN — LEVOFLOXACIN 750 MG: 5 INJECTION, SOLUTION INTRAVENOUS at 16:48

## 2019-02-04 RX ADMIN — GABAPENTIN 300 MG: 300 CAPSULE ORAL at 22:47

## 2019-02-04 RX ADMIN — SODIUM CHLORIDE 1935 ML: 9 INJECTION, SOLUTION INTRAVENOUS at 15:42

## 2019-02-04 RX ADMIN — Medication 10 ML: at 22:48

## 2019-02-04 RX ADMIN — METHYLPREDNISOLONE SODIUM SUCCINATE 40 MG: 40 INJECTION, POWDER, FOR SOLUTION INTRAMUSCULAR; INTRAVENOUS at 22:47

## 2019-02-04 RX ADMIN — Medication 1 MG: at 22:47

## 2019-02-05 LAB
-: NORMAL
ABSOLUTE EOS #: 0 K/UL (ref 0–0.4)
ABSOLUTE IMMATURE GRANULOCYTE: 0 K/UL (ref 0–0.3)
ABSOLUTE LYMPH #: 0.55 K/UL (ref 1–4.8)
ABSOLUTE MONO #: 0.37 K/UL (ref 0.1–0.8)
ANION GAP SERPL CALCULATED.3IONS-SCNC: 11 MMOL/L (ref 9–17)
BASOPHILS # BLD: 0 % (ref 0–2)
BASOPHILS ABSOLUTE: 0 K/UL (ref 0–0.2)
BUN BLDV-MCNC: 8 MG/DL (ref 8–23)
BUN/CREAT BLD: ABNORMAL (ref 9–20)
CALCIUM SERPL-MCNC: 8.1 MG/DL (ref 8.6–10.4)
CHLORIDE BLD-SCNC: 108 MMOL/L (ref 98–107)
CO2: 18 MMOL/L (ref 20–31)
CREAT SERPL-MCNC: 0.65 MG/DL (ref 0.5–0.9)
DIFFERENTIAL TYPE: ABNORMAL
EOSINOPHILS RELATIVE PERCENT: 0 % (ref 1–4)
FOLATE: >20 NG/ML
GFR AFRICAN AMERICAN: >60 ML/MIN
GFR NON-AFRICAN AMERICAN: >60 ML/MIN
GFR SERPL CREATININE-BSD FRML MDRD: ABNORMAL ML/MIN/{1.73_M2}
GFR SERPL CREATININE-BSD FRML MDRD: ABNORMAL ML/MIN/{1.73_M2}
GLUCOSE BLD-MCNC: 117 MG/DL (ref 70–99)
HCT VFR BLD CALC: 32.2 % (ref 36.3–47.1)
HEMOGLOBIN: 9.6 G/DL (ref 11.9–15.1)
IMMATURE GRANULOCYTES: 0 %
LYMPHOCYTES # BLD: 3 % (ref 24–44)
MCH RBC QN AUTO: 25.5 PG (ref 25.2–33.5)
MCHC RBC AUTO-ENTMCNC: 29.8 G/DL (ref 28.4–34.8)
MCV RBC AUTO: 85.4 FL (ref 82.6–102.9)
MONOCYTES # BLD: 2 % (ref 1–7)
MORPHOLOGY: ABNORMAL
NRBC AUTOMATED: 0.2 PER 100 WBC
PDW BLD-RTO: 21.6 % (ref 11.8–14.4)
PLATELET # BLD: 425 K/UL (ref 138–453)
PLATELET ESTIMATE: ABNORMAL
PMV BLD AUTO: 8.9 FL (ref 8.1–13.5)
POTASSIUM SERPL-SCNC: 5.1 MMOL/L (ref 3.7–5.3)
PROCALCITONIN: 0.2 NG/ML
RBC # BLD: 3.77 M/UL (ref 3.95–5.11)
RBC # BLD: ABNORMAL 10*6/UL
REASON FOR REJECTION: NORMAL
SEG NEUTROPHILS: 95 % (ref 36–66)
SEGMENTED NEUTROPHILS ABSOLUTE COUNT: 17.38 K/UL (ref 1.8–7.7)
SODIUM BLD-SCNC: 137 MMOL/L (ref 135–144)
TROPONIN INTERP: ABNORMAL
TROPONIN INTERP: ABNORMAL
TROPONIN T: ABNORMAL NG/ML
TROPONIN T: ABNORMAL NG/ML
TROPONIN, HIGH SENSITIVITY: 15 NG/L (ref 0–14)
TROPONIN, HIGH SENSITIVITY: 19 NG/L (ref 0–14)
VITAMIN B-12: 469 PG/ML (ref 232–1245)
WBC # BLD: 18.3 K/UL (ref 3.5–11.3)
WBC # BLD: ABNORMAL 10*3/UL
ZZ NTE CLEAN UP: ORDERED TEST: NORMAL
ZZ NTE WITH NAME CLEAN UP: SPECIMEN SOURCE: NORMAL

## 2019-02-05 PROCEDURE — 2700000000 HC OXYGEN THERAPY PER DAY

## 2019-02-05 PROCEDURE — 6370000000 HC RX 637 (ALT 250 FOR IP): Performed by: STUDENT IN AN ORGANIZED HEALTH CARE EDUCATION/TRAINING PROGRAM

## 2019-02-05 PROCEDURE — 85027 COMPLETE CBC AUTOMATED: CPT

## 2019-02-05 PROCEDURE — 94762 N-INVAS EAR/PLS OXIMTRY CONT: CPT

## 2019-02-05 PROCEDURE — 86738 MYCOPLASMA ANTIBODY: CPT

## 2019-02-05 PROCEDURE — 36415 COLL VENOUS BLD VENIPUNCTURE: CPT

## 2019-02-05 PROCEDURE — 94640 AIRWAY INHALATION TREATMENT: CPT

## 2019-02-05 PROCEDURE — 84484 ASSAY OF TROPONIN QUANT: CPT

## 2019-02-05 PROCEDURE — 2060000000 HC ICU INTERMEDIATE R&B

## 2019-02-05 PROCEDURE — 82746 ASSAY OF FOLIC ACID SERUM: CPT

## 2019-02-05 PROCEDURE — 76937 US GUIDE VASCULAR ACCESS: CPT

## 2019-02-05 PROCEDURE — 99223 1ST HOSP IP/OBS HIGH 75: CPT | Performed by: INTERNAL MEDICINE

## 2019-02-05 PROCEDURE — 6360000002 HC RX W HCPCS: Performed by: STUDENT IN AN ORGANIZED HEALTH CARE EDUCATION/TRAINING PROGRAM

## 2019-02-05 PROCEDURE — 82607 VITAMIN B-12: CPT

## 2019-02-05 PROCEDURE — 2580000003 HC RX 258: Performed by: STUDENT IN AN ORGANIZED HEALTH CARE EDUCATION/TRAINING PROGRAM

## 2019-02-05 PROCEDURE — 80048 BASIC METABOLIC PNL TOTAL CA: CPT

## 2019-02-05 RX ORDER — ALBUTEROL SULFATE 2.5 MG/3ML
2.5 SOLUTION RESPIRATORY (INHALATION) EVERY 6 HOURS PRN
Status: DISCONTINUED | OUTPATIENT
Start: 2019-02-05 | End: 2019-02-07 | Stop reason: HOSPADM

## 2019-02-05 RX ORDER — ALBUTEROL SULFATE 2.5 MG/3ML
2.5 SOLUTION RESPIRATORY (INHALATION)
Status: DISCONTINUED | OUTPATIENT
Start: 2019-02-05 | End: 2019-02-05

## 2019-02-05 RX ORDER — CARVEDILOL 3.12 MG/1
3.12 TABLET ORAL 2 TIMES DAILY WITH MEALS
Status: DISCONTINUED | OUTPATIENT
Start: 2019-02-05 | End: 2019-02-07

## 2019-02-05 RX ADMIN — VANCOMYCIN HYDROCHLORIDE 1000 MG: 1 INJECTION, SOLUTION INTRAVENOUS at 17:43

## 2019-02-05 RX ADMIN — GUAIFENESIN 600 MG: 600 TABLET, EXTENDED RELEASE ORAL at 20:11

## 2019-02-05 RX ADMIN — METHYLPREDNISOLONE SODIUM SUCCINATE 40 MG: 40 INJECTION, POWDER, FOR SOLUTION INTRAMUSCULAR; INTRAVENOUS at 05:14

## 2019-02-05 RX ADMIN — IPRATROPIUM BROMIDE AND ALBUTEROL SULFATE 1 AMPULE: .5; 3 SOLUTION RESPIRATORY (INHALATION) at 07:53

## 2019-02-05 RX ADMIN — GUAIFENESIN 600 MG: 600 TABLET, EXTENDED RELEASE ORAL at 09:02

## 2019-02-05 RX ADMIN — GABAPENTIN 300 MG: 300 CAPSULE ORAL at 13:54

## 2019-02-05 RX ADMIN — CARVEDILOL 3.12 MG: 3.12 TABLET, FILM COATED ORAL at 17:43

## 2019-02-05 RX ADMIN — GABAPENTIN 300 MG: 300 CAPSULE ORAL at 20:11

## 2019-02-05 RX ADMIN — METHYLPREDNISOLONE SODIUM SUCCINATE 40 MG: 40 INJECTION, POWDER, FOR SOLUTION INTRAMUSCULAR; INTRAVENOUS at 13:54

## 2019-02-05 RX ADMIN — BUPRENORPHINE AND NALOXONE 1 TABLET: 8; 2 TABLET SUBLINGUAL at 09:01

## 2019-02-05 RX ADMIN — SODIUM CHLORIDE: 9 INJECTION, SOLUTION INTRAVENOUS at 21:47

## 2019-02-05 RX ADMIN — Medication 10 ML: at 20:15

## 2019-02-05 RX ADMIN — LEVOFLOXACIN 500 MG: 5 INJECTION, SOLUTION INTRAVENOUS at 15:02

## 2019-02-05 RX ADMIN — GABAPENTIN 300 MG: 300 CAPSULE ORAL at 09:02

## 2019-02-05 RX ADMIN — MOMETASONE FUROATE AND FORMOTEROL FUMARATE DIHYDRATE 2 PUFF: 100; 5 AEROSOL RESPIRATORY (INHALATION) at 20:42

## 2019-02-05 RX ADMIN — IPRATROPIUM BROMIDE AND ALBUTEROL SULFATE 1 AMPULE: .5; 3 SOLUTION RESPIRATORY (INHALATION) at 11:49

## 2019-02-05 RX ADMIN — MOMETASONE FUROATE AND FORMOTEROL FUMARATE DIHYDRATE 2 PUFF: 100; 5 AEROSOL RESPIRATORY (INHALATION) at 07:53

## 2019-02-05 RX ADMIN — Medication 1 MG: at 20:11

## 2019-02-05 RX ADMIN — DOCUSATE SODIUM 100 MG: 100 CAPSULE, LIQUID FILLED ORAL at 20:15

## 2019-02-05 RX ADMIN — DOCUSATE SODIUM 100 MG: 100 CAPSULE, LIQUID FILLED ORAL at 09:01

## 2019-02-05 RX ADMIN — METHYLPREDNISOLONE SODIUM SUCCINATE 40 MG: 40 INJECTION, POWDER, FOR SOLUTION INTRAMUSCULAR; INTRAVENOUS at 21:45

## 2019-02-05 ASSESSMENT — PAIN SCALES - GENERAL
PAINLEVEL_OUTOF10: 8
PAINLEVEL_OUTOF10: 5
PAINLEVEL_OUTOF10: 8

## 2019-02-06 LAB
-: NORMAL
ABSOLUTE EOS #: <0.03 K/UL (ref 0–0.44)
ABSOLUTE IMMATURE GRANULOCYTE: 0.26 K/UL (ref 0–0.3)
ABSOLUTE LYMPH #: 0.6 K/UL (ref 1.1–3.7)
ABSOLUTE MONO #: 0.69 K/UL (ref 0.1–1.2)
AMORPHOUS: NORMAL
AMPHETAMINE SCREEN URINE: NEGATIVE
ANION GAP SERPL CALCULATED.3IONS-SCNC: 9 MMOL/L (ref 9–17)
BACTERIA: NORMAL
BARBITURATE SCREEN URINE: NEGATIVE
BASOPHILS # BLD: 0 % (ref 0–2)
BASOPHILS ABSOLUTE: 0.03 K/UL (ref 0–0.2)
BENZODIAZEPINE SCREEN, URINE: NEGATIVE
BILIRUBIN URINE: NEGATIVE
BUN BLDV-MCNC: 10 MG/DL (ref 8–23)
BUN/CREAT BLD: ABNORMAL (ref 9–20)
BUPRENORPHINE URINE: NORMAL
CALCIUM SERPL-MCNC: 8.8 MG/DL (ref 8.6–10.4)
CANNABINOID SCREEN URINE: NEGATIVE
CASTS UA: NORMAL /LPF (ref 0–8)
CHLORIDE BLD-SCNC: 109 MMOL/L (ref 98–107)
CO2: 19 MMOL/L (ref 20–31)
COCAINE METABOLITE, URINE: NEGATIVE
COLOR: YELLOW
COMMENT UA: ABNORMAL
CREAT SERPL-MCNC: 0.61 MG/DL (ref 0.5–0.9)
CRYSTALS, UA: NORMAL /HPF
DIFFERENTIAL TYPE: ABNORMAL
DIRECT EXAM: NORMAL
EOSINOPHILS RELATIVE PERCENT: 0 % (ref 1–4)
EPITHELIAL CELLS UA: NORMAL /HPF (ref 0–5)
GFR AFRICAN AMERICAN: >60 ML/MIN
GFR NON-AFRICAN AMERICAN: >60 ML/MIN
GFR SERPL CREATININE-BSD FRML MDRD: ABNORMAL ML/MIN/{1.73_M2}
GFR SERPL CREATININE-BSD FRML MDRD: ABNORMAL ML/MIN/{1.73_M2}
GLUCOSE BLD-MCNC: 270 MG/DL (ref 70–99)
GLUCOSE URINE: ABNORMAL
HCT VFR BLD CALC: 27.6 % (ref 36.3–47.1)
HEMOGLOBIN: 8.2 G/DL (ref 11.9–15.1)
IMMATURE GRANULOCYTES: 1 %
KETONES, URINE: NEGATIVE
LEUKOCYTE ESTERASE, URINE: ABNORMAL
LYMPHOCYTES # BLD: 3 % (ref 24–43)
Lab: NORMAL
MCH RBC QN AUTO: 24.3 PG (ref 25.2–33.5)
MCHC RBC AUTO-ENTMCNC: 29.7 G/DL (ref 28.4–34.8)
MCV RBC AUTO: 81.9 FL (ref 82.6–102.9)
MDMA URINE: NORMAL
METHADONE SCREEN, URINE: NEGATIVE
METHAMPHETAMINE, URINE: NORMAL
MONOCYTES # BLD: 4 % (ref 3–12)
MUCUS: NORMAL
MYCOPLASMA PNEUMONIAE IGM: 0.34
NITRITE, URINE: NEGATIVE
NRBC AUTOMATED: 0.3 PER 100 WBC
OPIATES, URINE: NEGATIVE
OTHER OBSERVATIONS UA: NORMAL
OXYCODONE SCREEN URINE: NEGATIVE
PDW BLD-RTO: 21.1 % (ref 11.8–14.4)
PH UA: 6.5 (ref 5–8)
PHENCYCLIDINE, URINE: NEGATIVE
PLATELET # BLD: 385 K/UL (ref 138–453)
PLATELET ESTIMATE: ABNORMAL
PMV BLD AUTO: 9.1 FL (ref 8.1–13.5)
POTASSIUM SERPL-SCNC: 4.2 MMOL/L (ref 3.7–5.3)
PROPOXYPHENE, URINE: NORMAL
PROTEIN UA: NEGATIVE
RBC # BLD: 3.37 M/UL (ref 3.95–5.11)
RBC # BLD: ABNORMAL 10*6/UL
RBC UA: NORMAL /HPF (ref 0–4)
RENAL EPITHELIAL, UA: NORMAL /HPF
SEG NEUTROPHILS: 92 % (ref 36–65)
SEGMENTED NEUTROPHILS ABSOLUTE COUNT: 17.46 K/UL (ref 1.5–8.1)
SODIUM BLD-SCNC: 137 MMOL/L (ref 135–144)
SPECIFIC GRAVITY UA: 1.01 (ref 1–1.03)
SPECIMEN DESCRIPTION: NORMAL
STATUS: NORMAL
TEST INFORMATION: NORMAL
TRICHOMONAS: NORMAL
TRICYCLIC ANTIDEPRESSANTS, UR: NORMAL
TURBIDITY: CLEAR
URINE HGB: NEGATIVE
UROBILINOGEN, URINE: NORMAL
WBC # BLD: 19 K/UL (ref 3.5–11.3)
WBC # BLD: ABNORMAL 10*3/UL
WBC UA: NORMAL /HPF (ref 0–5)
YEAST: NORMAL

## 2019-02-06 PROCEDURE — 6360000002 HC RX W HCPCS: Performed by: STUDENT IN AN ORGANIZED HEALTH CARE EDUCATION/TRAINING PROGRAM

## 2019-02-06 PROCEDURE — 97530 THERAPEUTIC ACTIVITIES: CPT

## 2019-02-06 PROCEDURE — 97166 OT EVAL MOD COMPLEX 45 MIN: CPT

## 2019-02-06 PROCEDURE — 81001 URINALYSIS AUTO W/SCOPE: CPT

## 2019-02-06 PROCEDURE — 97535 SELF CARE MNGMENT TRAINING: CPT

## 2019-02-06 PROCEDURE — 87086 URINE CULTURE/COLONY COUNT: CPT

## 2019-02-06 PROCEDURE — 6370000000 HC RX 637 (ALT 250 FOR IP): Performed by: STUDENT IN AN ORGANIZED HEALTH CARE EDUCATION/TRAINING PROGRAM

## 2019-02-06 PROCEDURE — 80048 BASIC METABOLIC PNL TOTAL CA: CPT

## 2019-02-06 PROCEDURE — 94640 AIRWAY INHALATION TREATMENT: CPT

## 2019-02-06 PROCEDURE — 2060000000 HC ICU INTERMEDIATE R&B

## 2019-02-06 PROCEDURE — 97162 PT EVAL MOD COMPLEX 30 MIN: CPT

## 2019-02-06 PROCEDURE — 85025 COMPLETE CBC W/AUTO DIFF WBC: CPT

## 2019-02-06 PROCEDURE — 2580000003 HC RX 258: Performed by: STUDENT IN AN ORGANIZED HEALTH CARE EDUCATION/TRAINING PROGRAM

## 2019-02-06 PROCEDURE — 99233 SBSQ HOSP IP/OBS HIGH 50: CPT | Performed by: INTERNAL MEDICINE

## 2019-02-06 PROCEDURE — 94762 N-INVAS EAR/PLS OXIMTRY CONT: CPT

## 2019-02-06 PROCEDURE — 2700000000 HC OXYGEN THERAPY PER DAY

## 2019-02-06 RX ORDER — LEVOFLOXACIN 500 MG/1
500 TABLET, FILM COATED ORAL DAILY
Status: DISCONTINUED | OUTPATIENT
Start: 2019-02-06 | End: 2019-02-07 | Stop reason: HOSPADM

## 2019-02-06 RX ORDER — PREDNISONE 20 MG/1
40 TABLET ORAL DAILY
Status: DISCONTINUED | OUTPATIENT
Start: 2019-02-06 | End: 2019-02-07 | Stop reason: HOSPADM

## 2019-02-06 RX ADMIN — CARVEDILOL 3.12 MG: 3.12 TABLET, FILM COATED ORAL at 09:00

## 2019-02-06 RX ADMIN — Medication 10 ML: at 20:25

## 2019-02-06 RX ADMIN — Medication 250 MCG: at 09:00

## 2019-02-06 RX ADMIN — MOMETASONE FUROATE AND FORMOTEROL FUMARATE DIHYDRATE 2 PUFF: 100; 5 AEROSOL RESPIRATORY (INHALATION) at 20:59

## 2019-02-06 RX ADMIN — IRON SUCROSE 200 MG: 20 INJECTION, SOLUTION INTRAVENOUS at 11:24

## 2019-02-06 RX ADMIN — BENZOCAINE, MENTHOL 1 LOZENGE: 15; 3.6 LOZENGE ORAL at 20:28

## 2019-02-06 RX ADMIN — METHYLPREDNISOLONE SODIUM SUCCINATE 40 MG: 40 INJECTION, POWDER, FOR SOLUTION INTRAMUSCULAR; INTRAVENOUS at 06:25

## 2019-02-06 RX ADMIN — ALBUTEROL SULFATE 2.5 MG: 2.5 SOLUTION RESPIRATORY (INHALATION) at 18:02

## 2019-02-06 RX ADMIN — MOMETASONE FUROATE AND FORMOTEROL FUMARATE DIHYDRATE 2 PUFF: 100; 5 AEROSOL RESPIRATORY (INHALATION) at 09:04

## 2019-02-06 RX ADMIN — BENZOCAINE, MENTHOL 1 LOZENGE: 15; 3.6 LOZENGE ORAL at 17:57

## 2019-02-06 RX ADMIN — LEVOFLOXACIN 500 MG: 500 TABLET, FILM COATED ORAL at 12:13

## 2019-02-06 RX ADMIN — IPRATROPIUM BROMIDE AND ALBUTEROL SULFATE 3 ML: .5; 3 SOLUTION RESPIRATORY (INHALATION) at 05:48

## 2019-02-06 RX ADMIN — GUAIFENESIN 600 MG: 600 TABLET, EXTENDED RELEASE ORAL at 20:22

## 2019-02-06 RX ADMIN — GABAPENTIN 300 MG: 300 CAPSULE ORAL at 20:21

## 2019-02-06 RX ADMIN — GUAIFENESIN 600 MG: 600 TABLET, EXTENDED RELEASE ORAL at 09:00

## 2019-02-06 RX ADMIN — MULTIPLE VITAMINS W/ MINERALS TAB 1 TABLET: TAB at 09:00

## 2019-02-06 RX ADMIN — GABAPENTIN 300 MG: 300 CAPSULE ORAL at 15:57

## 2019-02-06 RX ADMIN — DOCUSATE SODIUM 100 MG: 100 CAPSULE, LIQUID FILLED ORAL at 09:00

## 2019-02-06 RX ADMIN — GABAPENTIN 300 MG: 300 CAPSULE ORAL at 09:01

## 2019-02-06 RX ADMIN — DOCUSATE SODIUM 100 MG: 100 CAPSULE, LIQUID FILLED ORAL at 20:21

## 2019-02-06 RX ADMIN — PREDNISONE 40 MG: 20 TABLET ORAL at 12:13

## 2019-02-06 RX ADMIN — BUPRENORPHINE AND NALOXONE 1 TABLET: 8; 2 TABLET SUBLINGUAL at 09:04

## 2019-02-06 RX ADMIN — Medication 10 ML: at 09:00

## 2019-02-06 RX ADMIN — FOLIC ACID 1 MG: 1 TABLET ORAL at 09:00

## 2019-02-06 RX ADMIN — Medication 100 MG: at 09:00

## 2019-02-06 RX ADMIN — ACETAMINOPHEN 650 MG: 325 TABLET ORAL at 08:59

## 2019-02-06 RX ADMIN — CARVEDILOL 3.12 MG: 3.12 TABLET, FILM COATED ORAL at 16:43

## 2019-02-06 ASSESSMENT — PAIN DESCRIPTION - LOCATION
LOCATION: BACK
LOCATION: BACK

## 2019-02-06 ASSESSMENT — PAIN SCALES - GENERAL
PAINLEVEL_OUTOF10: 3
PAINLEVEL_OUTOF10: 7
PAINLEVEL_OUTOF10: 3
PAINLEVEL_OUTOF10: 7

## 2019-02-06 ASSESSMENT — PAIN DESCRIPTION - PAIN TYPE: TYPE: ACUTE PAIN

## 2019-02-06 ASSESSMENT — PAIN DESCRIPTION - DESCRIPTORS: DESCRIPTORS: ACHING

## 2019-02-06 ASSESSMENT — PAIN DESCRIPTION - FREQUENCY: FREQUENCY: CONTINUOUS

## 2019-02-07 VITALS
SYSTOLIC BLOOD PRESSURE: 154 MMHG | OXYGEN SATURATION: 99 % | HEIGHT: 60 IN | BODY MASS INDEX: 25.97 KG/M2 | TEMPERATURE: 98 F | HEART RATE: 90 BPM | DIASTOLIC BLOOD PRESSURE: 83 MMHG | WEIGHT: 132.28 LBS | RESPIRATION RATE: 20 BRPM

## 2019-02-07 LAB
ABSOLUTE EOS #: 0 K/UL (ref 0–0.4)
ABSOLUTE IMMATURE GRANULOCYTE: 0 K/UL (ref 0–0.3)
ABSOLUTE LYMPH #: 1.52 K/UL (ref 1–4.8)
ABSOLUTE MONO #: 0.91 K/UL (ref 0.1–0.8)
ANION GAP SERPL CALCULATED.3IONS-SCNC: 11 MMOL/L (ref 9–17)
BASOPHILS # BLD: 0 % (ref 0–2)
BASOPHILS ABSOLUTE: 0 K/UL (ref 0–0.2)
BUN BLDV-MCNC: 6 MG/DL (ref 8–23)
BUN/CREAT BLD: ABNORMAL (ref 9–20)
CALCIUM SERPL-MCNC: 8.8 MG/DL (ref 8.6–10.4)
CHLORIDE BLD-SCNC: 104 MMOL/L (ref 98–107)
CO2: 24 MMOL/L (ref 20–31)
CREAT SERPL-MCNC: 0.49 MG/DL (ref 0.5–0.9)
CULTURE: NORMAL
DIFFERENTIAL TYPE: ABNORMAL
EOSINOPHILS RELATIVE PERCENT: 0 % (ref 1–4)
GFR AFRICAN AMERICAN: >60 ML/MIN
GFR NON-AFRICAN AMERICAN: >60 ML/MIN
GFR SERPL CREATININE-BSD FRML MDRD: ABNORMAL ML/MIN/{1.73_M2}
GFR SERPL CREATININE-BSD FRML MDRD: ABNORMAL ML/MIN/{1.73_M2}
GLUCOSE BLD-MCNC: 172 MG/DL (ref 70–99)
HCT VFR BLD CALC: 29.2 % (ref 36.3–47.1)
HEMOGLOBIN: 8.4 G/DL (ref 11.9–15.1)
IMMATURE GRANULOCYTES: 0 %
LYMPHOCYTES # BLD: 10 % (ref 24–44)
Lab: NORMAL
MCH RBC QN AUTO: 25.3 PG (ref 25.2–33.5)
MCHC RBC AUTO-ENTMCNC: 28.8 G/DL (ref 28.4–34.8)
MCV RBC AUTO: 88 FL (ref 82.6–102.9)
MONOCYTES # BLD: 6 % (ref 1–7)
MORPHOLOGY: ABNORMAL
NRBC AUTOMATED: 1.1 PER 100 WBC
NUCLEATED RED BLOOD CELLS: 2 PER 100 WBC
PDW BLD-RTO: 21.6 % (ref 11.8–14.4)
PLATELET # BLD: 493 K/UL (ref 138–453)
PLATELET ESTIMATE: ABNORMAL
PMV BLD AUTO: 8.9 FL (ref 8.1–13.5)
POTASSIUM SERPL-SCNC: 3.6 MMOL/L (ref 3.7–5.3)
RBC # BLD: 3.32 M/UL (ref 3.95–5.11)
RBC # BLD: ABNORMAL 10*6/UL
SEG NEUTROPHILS: 84 % (ref 36–66)
SEGMENTED NEUTROPHILS ABSOLUTE COUNT: 12.77 K/UL (ref 1.8–7.7)
SODIUM BLD-SCNC: 139 MMOL/L (ref 135–144)
SPECIMEN DESCRIPTION: NORMAL
STATUS: NORMAL
WBC # BLD: 15.2 K/UL (ref 3.5–11.3)
WBC # BLD: ABNORMAL 10*3/UL

## 2019-02-07 PROCEDURE — 36415 COLL VENOUS BLD VENIPUNCTURE: CPT

## 2019-02-07 PROCEDURE — 6370000000 HC RX 637 (ALT 250 FOR IP): Performed by: STUDENT IN AN ORGANIZED HEALTH CARE EDUCATION/TRAINING PROGRAM

## 2019-02-07 PROCEDURE — 99239 HOSP IP/OBS DSCHRG MGMT >30: CPT | Performed by: INTERNAL MEDICINE

## 2019-02-07 PROCEDURE — 85025 COMPLETE CBC W/AUTO DIFF WBC: CPT

## 2019-02-07 PROCEDURE — 2580000003 HC RX 258: Performed by: STUDENT IN AN ORGANIZED HEALTH CARE EDUCATION/TRAINING PROGRAM

## 2019-02-07 PROCEDURE — 94640 AIRWAY INHALATION TREATMENT: CPT

## 2019-02-07 PROCEDURE — 2500000003 HC RX 250 WO HCPCS: Performed by: STUDENT IN AN ORGANIZED HEALTH CARE EDUCATION/TRAINING PROGRAM

## 2019-02-07 PROCEDURE — 80048 BASIC METABOLIC PNL TOTAL CA: CPT

## 2019-02-07 PROCEDURE — 6360000002 HC RX W HCPCS: Performed by: STUDENT IN AN ORGANIZED HEALTH CARE EDUCATION/TRAINING PROGRAM

## 2019-02-07 PROCEDURE — 97116 GAIT TRAINING THERAPY: CPT

## 2019-02-07 PROCEDURE — 97110 THERAPEUTIC EXERCISES: CPT

## 2019-02-07 RX ORDER — POTASSIUM CHLORIDE 20 MEQ/1
40 TABLET, EXTENDED RELEASE ORAL PRN
Status: DISCONTINUED | OUTPATIENT
Start: 2019-02-07 | End: 2019-02-07 | Stop reason: HOSPADM

## 2019-02-07 RX ORDER — PREDNISONE 20 MG/1
40 TABLET ORAL DAILY
Qty: 10 TABLET | Refills: 0 | Status: SHIPPED | OUTPATIENT
Start: 2019-02-08 | End: 2019-02-13

## 2019-02-07 RX ORDER — POTASSIUM CHLORIDE 7.45 MG/ML
10 INJECTION INTRAVENOUS PRN
Status: DISCONTINUED | OUTPATIENT
Start: 2019-02-07 | End: 2019-02-07 | Stop reason: HOSPADM

## 2019-02-07 RX ORDER — POTASSIUM CHLORIDE 20MEQ/15ML
40 LIQUID (ML) ORAL PRN
Status: DISCONTINUED | OUTPATIENT
Start: 2019-02-07 | End: 2019-02-07 | Stop reason: HOSPADM

## 2019-02-07 RX ORDER — METOPROLOL TARTRATE 5 MG/5ML
5 INJECTION INTRAVENOUS ONCE
Status: COMPLETED | OUTPATIENT
Start: 2019-02-07 | End: 2019-02-07

## 2019-02-07 RX ORDER — CARVEDILOL 6.25 MG/1
6.25 TABLET ORAL 2 TIMES DAILY WITH MEALS
Status: DISCONTINUED | OUTPATIENT
Start: 2019-02-07 | End: 2019-02-07 | Stop reason: HOSPADM

## 2019-02-07 RX ORDER — LEVOFLOXACIN 500 MG/1
500 TABLET, FILM COATED ORAL DAILY
Qty: 5 TABLET | Refills: 0 | Status: SHIPPED | OUTPATIENT
Start: 2019-02-08 | End: 2019-02-13

## 2019-02-07 RX ORDER — FERROUS SULFATE 325(65) MG
325 TABLET ORAL DAILY
Qty: 30 TABLET | Refills: 0 | Status: ON HOLD | OUTPATIENT
Start: 2019-02-07 | End: 2021-04-06 | Stop reason: SDUPTHER

## 2019-02-07 RX ORDER — CARVEDILOL 6.25 MG/1
6.25 TABLET ORAL 2 TIMES DAILY WITH MEALS
Qty: 60 TABLET | Refills: 3 | Status: ON HOLD | OUTPATIENT
Start: 2019-02-07 | End: 2021-04-06 | Stop reason: SDUPTHER

## 2019-02-07 RX ADMIN — Medication 10 ML: at 09:10

## 2019-02-07 RX ADMIN — METOPROLOL TARTRATE 5 MG: 5 INJECTION INTRAVENOUS at 12:22

## 2019-02-07 RX ADMIN — CARVEDILOL 6.25 MG: 6.25 TABLET, FILM COATED ORAL at 17:30

## 2019-02-07 RX ADMIN — BUPRENORPHINE AND NALOXONE 1 TABLET: 8; 2 TABLET SUBLINGUAL at 09:13

## 2019-02-07 RX ADMIN — GABAPENTIN 300 MG: 300 CAPSULE ORAL at 09:10

## 2019-02-07 RX ADMIN — PANTOPRAZOLE SODIUM 40 MG: 40 TABLET, DELAYED RELEASE ORAL at 06:02

## 2019-02-07 RX ADMIN — Medication 100 MG: at 09:10

## 2019-02-07 RX ADMIN — GUAIFENESIN 600 MG: 600 TABLET, EXTENDED RELEASE ORAL at 09:10

## 2019-02-07 RX ADMIN — GABAPENTIN 300 MG: 300 CAPSULE ORAL at 14:36

## 2019-02-07 RX ADMIN — ACETAMINOPHEN 650 MG: 325 TABLET ORAL at 17:30

## 2019-02-07 RX ADMIN — FOLIC ACID 1 MG: 1 TABLET ORAL at 09:10

## 2019-02-07 RX ADMIN — DOCUSATE SODIUM 100 MG: 100 CAPSULE, LIQUID FILLED ORAL at 09:10

## 2019-02-07 RX ADMIN — MOMETASONE FUROATE AND FORMOTEROL FUMARATE DIHYDRATE 2 PUFF: 100; 5 AEROSOL RESPIRATORY (INHALATION) at 07:55

## 2019-02-07 RX ADMIN — PREDNISONE 40 MG: 20 TABLET ORAL at 09:10

## 2019-02-07 RX ADMIN — LEVOFLOXACIN 500 MG: 500 TABLET, FILM COATED ORAL at 09:10

## 2019-02-07 RX ADMIN — Medication 250 MCG: at 09:10

## 2019-02-07 ASSESSMENT — PAIN SCALES - GENERAL
PAINLEVEL_OUTOF10: 3
PAINLEVEL_OUTOF10: 0
PAINLEVEL_OUTOF10: 4

## 2019-03-11 ENCOUNTER — APPOINTMENT (OUTPATIENT)
Dept: GENERAL RADIOLOGY | Age: 64
End: 2019-03-11
Payer: MEDICARE

## 2019-03-11 ENCOUNTER — HOSPITAL ENCOUNTER (EMERGENCY)
Age: 64
Discharge: HOME | End: 2019-03-11
Attending: EMERGENCY MEDICINE
Payer: MEDICARE

## 2019-03-11 VITALS
BODY MASS INDEX: 27.48 KG/M2 | SYSTOLIC BLOOD PRESSURE: 141 MMHG | OXYGEN SATURATION: 93 % | HEIGHT: 60 IN | HEART RATE: 95 BPM | WEIGHT: 140 LBS | RESPIRATION RATE: 15 BRPM | DIASTOLIC BLOOD PRESSURE: 78 MMHG | TEMPERATURE: 98.3 F

## 2019-03-11 DIAGNOSIS — B34.9 VIRAL SYNDROME: Primary | ICD-10-CM

## 2019-03-11 LAB
ABSOLUTE EOS #: 0.1 K/UL (ref 0–0.44)
ABSOLUTE IMMATURE GRANULOCYTE: 0.05 K/UL (ref 0–0.3)
ABSOLUTE LYMPH #: 1.18 K/UL (ref 1.1–3.7)
ABSOLUTE MONO #: 0.59 K/UL (ref 0.1–1.2)
ANION GAP SERPL CALCULATED.3IONS-SCNC: 16 MMOL/L (ref 9–17)
BASOPHILS # BLD: 1 % (ref 0–2)
BASOPHILS ABSOLUTE: 0.05 K/UL (ref 0–0.2)
BUN BLDV-MCNC: 7 MG/DL (ref 8–23)
BUN/CREAT BLD: ABNORMAL (ref 9–20)
CALCIUM SERPL-MCNC: 9.2 MG/DL (ref 8.6–10.4)
CHLORIDE BLD-SCNC: 100 MMOL/L (ref 98–107)
CO2: 20 MMOL/L (ref 20–31)
CREAT SERPL-MCNC: 0.56 MG/DL (ref 0.5–0.9)
DIFFERENTIAL TYPE: ABNORMAL
DIRECT EXAM: NORMAL
EOSINOPHILS RELATIVE PERCENT: 2 % (ref 1–4)
GFR AFRICAN AMERICAN: >60 ML/MIN
GFR NON-AFRICAN AMERICAN: >60 ML/MIN
GFR SERPL CREATININE-BSD FRML MDRD: ABNORMAL ML/MIN/{1.73_M2}
GFR SERPL CREATININE-BSD FRML MDRD: ABNORMAL ML/MIN/{1.73_M2}
GLUCOSE BLD-MCNC: 72 MG/DL (ref 70–99)
HCT VFR BLD CALC: 43.8 % (ref 36.3–47.1)
HEMOGLOBIN: 12.8 G/DL (ref 11.9–15.1)
IMMATURE GRANULOCYTES: 1 %
LYMPHOCYTES # BLD: 24 % (ref 24–43)
Lab: NORMAL
MCH RBC QN AUTO: 25.1 PG (ref 25.2–33.5)
MCHC RBC AUTO-ENTMCNC: 29.2 G/DL (ref 28.4–34.8)
MCV RBC AUTO: 86.1 FL (ref 82.6–102.9)
MONOCYTES # BLD: 12 % (ref 3–12)
MORPHOLOGY: ABNORMAL
NRBC AUTOMATED: 0 PER 100 WBC
PDW BLD-RTO: 21 % (ref 11.8–14.4)
PLATELET # BLD: 637 K/UL (ref 138–453)
PLATELET ESTIMATE: ABNORMAL
PMV BLD AUTO: 8.8 FL (ref 8.1–13.5)
POTASSIUM SERPL-SCNC: 4.6 MMOL/L (ref 3.7–5.3)
RBC # BLD: 5.09 M/UL (ref 3.95–5.11)
RBC # BLD: ABNORMAL 10*6/UL
SEG NEUTROPHILS: 60 % (ref 36–65)
SEGMENTED NEUTROPHILS ABSOLUTE COUNT: 2.93 K/UL (ref 1.5–8.1)
SODIUM BLD-SCNC: 136 MMOL/L (ref 135–144)
SPECIMEN DESCRIPTION: NORMAL
TROPONIN INTERP: NORMAL
TROPONIN T: NORMAL NG/ML
TROPONIN, HIGH SENSITIVITY: 13 NG/L (ref 0–14)
WBC # BLD: 4.9 K/UL (ref 3.5–11.3)
WBC # BLD: ABNORMAL 10*3/UL

## 2019-03-11 PROCEDURE — 99285 EMERGENCY DEPT VISIT HI MDM: CPT

## 2019-03-11 PROCEDURE — 6370000000 HC RX 637 (ALT 250 FOR IP): Performed by: EMERGENCY MEDICINE

## 2019-03-11 PROCEDURE — 87502 INFLUENZA DNA AMP PROBE: CPT

## 2019-03-11 PROCEDURE — 85025 COMPLETE CBC W/AUTO DIFF WBC: CPT

## 2019-03-11 PROCEDURE — 80048 BASIC METABOLIC PNL TOTAL CA: CPT

## 2019-03-11 PROCEDURE — 87804 INFLUENZA ASSAY W/OPTIC: CPT

## 2019-03-11 PROCEDURE — 84484 ASSAY OF TROPONIN QUANT: CPT

## 2019-03-11 PROCEDURE — 71046 X-RAY EXAM CHEST 2 VIEWS: CPT

## 2019-03-11 PROCEDURE — 93005 ELECTROCARDIOGRAM TRACING: CPT

## 2019-03-11 RX ORDER — ACETAMINOPHEN 325 MG/1
650 TABLET ORAL ONCE
Status: COMPLETED | OUTPATIENT
Start: 2019-03-11 | End: 2019-03-11

## 2019-03-11 RX ORDER — BENZONATATE 100 MG/1
100 CAPSULE ORAL 3 TIMES DAILY PRN
Qty: 10 CAPSULE | Refills: 0 | Status: SHIPPED | OUTPATIENT
Start: 2019-03-11 | End: 2021-03-28

## 2019-03-11 RX ADMIN — ACETAMINOPHEN 650 MG: 325 TABLET ORAL at 17:18

## 2019-03-11 RX ADMIN — ACETAMINOPHEN 650 MG: 325 TABLET ORAL at 17:44

## 2019-03-11 ASSESSMENT — PAIN SCALES - GENERAL
PAINLEVEL_OUTOF10: 8
PAINLEVEL_OUTOF10: 8

## 2019-03-12 LAB
EKG ATRIAL RATE: 94 BPM
EKG P AXIS: 68 DEGREES
EKG P-R INTERVAL: 160 MS
EKG Q-T INTERVAL: 360 MS
EKG QRS DURATION: 66 MS
EKG QTC CALCULATION (BAZETT): 450 MS
EKG R AXIS: 42 DEGREES
EKG T AXIS: 77 DEGREES
EKG VENTRICULAR RATE: 94 BPM
INFLUENZA A: NEGATIVE
INFLUENZA A: NORMAL
INFLUENZA B: NEGATIVE
INFLUENZA B: NORMAL
SPECIMEN DESCRIPTION: NORMAL

## 2019-03-12 ASSESSMENT — ENCOUNTER SYMPTOMS
VOMITING: 0
DIARRHEA: 0
ABDOMINAL PAIN: 0
NAUSEA: 0
SHORTNESS OF BREATH: 0
CHEST TIGHTNESS: 0
BACK PAIN: 1
COUGH: 1

## 2019-03-12 ASSESSMENT — HEART SCORE: ECG: 0

## 2019-04-20 ENCOUNTER — APPOINTMENT (OUTPATIENT)
Dept: CT IMAGING | Age: 64
DRG: 469 | End: 2019-04-20
Payer: MEDICARE

## 2019-04-20 ENCOUNTER — HOSPITAL ENCOUNTER (INPATIENT)
Age: 64
LOS: 2 days | Discharge: LEFT BEFORE DISCHARGE | DRG: 469 | End: 2019-04-22
Attending: EMERGENCY MEDICINE | Admitting: INTERNAL MEDICINE
Payer: MEDICARE

## 2019-04-20 ENCOUNTER — APPOINTMENT (OUTPATIENT)
Dept: GENERAL RADIOLOGY | Age: 64
DRG: 469 | End: 2019-04-20
Payer: MEDICARE

## 2019-04-20 DIAGNOSIS — E87.6 HYPOKALEMIA: ICD-10-CM

## 2019-04-20 DIAGNOSIS — I95.89 HYPOTENSION DUE TO HYPOVOLEMIA: ICD-10-CM

## 2019-04-20 DIAGNOSIS — N17.9 ACUTE KIDNEY INJURY (HCC): Primary | ICD-10-CM

## 2019-04-20 DIAGNOSIS — E86.1 HYPOTENSION DUE TO HYPOVOLEMIA: ICD-10-CM

## 2019-04-20 DIAGNOSIS — I63.9 CEREBROVASCULAR ACCIDENT (CVA), UNSPECIFIED MECHANISM (HCC): ICD-10-CM

## 2019-04-20 LAB
% CKMB: 1.4 % (ref 0–3)
ABSOLUTE EOS #: 0.08 K/UL (ref 0–0.4)
ABSOLUTE IMMATURE GRANULOCYTE: 0.08 K/UL (ref 0–0.3)
ABSOLUTE LYMPH #: 1.6 K/UL (ref 1–4.8)
ABSOLUTE MONO #: 0.48 K/UL (ref 0.1–0.8)
ANION GAP SERPL CALCULATED.3IONS-SCNC: 15 MMOL/L (ref 9–17)
ATYPICAL LYMPHOCYTE ABSOLUTE COUNT: 0.16 K/UL
ATYPICAL LYMPHOCYTES: 2 %
BASOPHILS # BLD: 0 % (ref 0–2)
BASOPHILS ABSOLUTE: 0 K/UL (ref 0–0.2)
BUN BLDV-MCNC: 20 MG/DL (ref 8–23)
BUN/CREAT BLD: ABNORMAL (ref 9–20)
CALCIUM SERPL-MCNC: 9.4 MG/DL (ref 8.6–10.4)
CHLORIDE BLD-SCNC: 100 MMOL/L (ref 98–107)
CK MB: 1.1 NG/ML
CKMB INTERPRETATION: ABNORMAL
CO2: 20 MMOL/L (ref 20–31)
CREAT SERPL-MCNC: 2.27 MG/DL (ref 0.5–0.9)
DIFFERENTIAL TYPE: ABNORMAL
EOSINOPHILS RELATIVE PERCENT: 1 % (ref 1–4)
GFR AFRICAN AMERICAN: 26 ML/MIN
GFR NON-AFRICAN AMERICAN: 22 ML/MIN
GFR SERPL CREATININE-BSD FRML MDRD: ABNORMAL ML/MIN/{1.73_M2}
GFR SERPL CREATININE-BSD FRML MDRD: ABNORMAL ML/MIN/{1.73_M2}
GLUCOSE BLD-MCNC: 88 MG/DL (ref 70–99)
HCT VFR BLD CALC: 40.1 % (ref 36.3–47.1)
HEMOGLOBIN: 11.8 G/DL (ref 11.9–15.1)
IMMATURE GRANULOCYTES: 1 %
INR BLD: 1.1
LYMPHOCYTES # BLD: 20 % (ref 24–44)
MCH RBC QN AUTO: 25.7 PG (ref 25.2–33.5)
MCHC RBC AUTO-ENTMCNC: 29.4 G/DL (ref 28.4–34.8)
MCV RBC AUTO: 87.4 FL (ref 82.6–102.9)
MONOCYTES # BLD: 6 % (ref 1–7)
MORPHOLOGY: ABNORMAL
MYOGLOBIN: 86 NG/ML (ref 25–58)
NRBC AUTOMATED: 0 PER 100 WBC
PARTIAL THROMBOPLASTIN TIME: 22.7 SEC (ref 20.5–30.5)
PDW BLD-RTO: 20.4 % (ref 11.8–14.4)
PLATELET # BLD: 479 K/UL (ref 138–453)
PLATELET ESTIMATE: ABNORMAL
PMV BLD AUTO: 9.4 FL (ref 8.1–13.5)
POTASSIUM SERPL-SCNC: 3.2 MMOL/L (ref 3.7–5.3)
PROTHROMBIN TIME: 11.1 SEC (ref 9–12)
RBC # BLD: 4.59 M/UL (ref 3.95–5.11)
RBC # BLD: ABNORMAL 10*6/UL
SEG NEUTROPHILS: 70 % (ref 36–66)
SEGMENTED NEUTROPHILS ABSOLUTE COUNT: 5.6 K/UL (ref 1.8–7.7)
SODIUM BLD-SCNC: 135 MMOL/L (ref 135–144)
TOTAL CK: 76 U/L (ref 26–192)
TROPONIN INTERP: ABNORMAL
TROPONIN INTERP: ABNORMAL
TROPONIN T: ABNORMAL NG/ML
TROPONIN T: ABNORMAL NG/ML
TROPONIN, HIGH SENSITIVITY: 21 NG/L (ref 0–14)
TROPONIN, HIGH SENSITIVITY: 23 NG/L (ref 0–14)
WBC # BLD: 8 K/UL (ref 3.5–11.3)
WBC # BLD: ABNORMAL 10*3/UL

## 2019-04-20 PROCEDURE — 96361 HYDRATE IV INFUSION ADD-ON: CPT

## 2019-04-20 PROCEDURE — 99223 1ST HOSP IP/OBS HIGH 75: CPT | Performed by: PSYCHIATRY & NEUROLOGY

## 2019-04-20 PROCEDURE — 6370000000 HC RX 637 (ALT 250 FOR IP): Performed by: STUDENT IN AN ORGANIZED HEALTH CARE EDUCATION/TRAINING PROGRAM

## 2019-04-20 PROCEDURE — 6360000002 HC RX W HCPCS: Performed by: STUDENT IN AN ORGANIZED HEALTH CARE EDUCATION/TRAINING PROGRAM

## 2019-04-20 PROCEDURE — 82550 ASSAY OF CK (CPK): CPT

## 2019-04-20 PROCEDURE — 82607 VITAMIN B-12: CPT

## 2019-04-20 PROCEDURE — 80048 BASIC METABOLIC PNL TOTAL CA: CPT

## 2019-04-20 PROCEDURE — 84484 ASSAY OF TROPONIN QUANT: CPT

## 2019-04-20 PROCEDURE — 2580000003 HC RX 258: Performed by: STUDENT IN AN ORGANIZED HEALTH CARE EDUCATION/TRAINING PROGRAM

## 2019-04-20 PROCEDURE — 96360 HYDRATION IV INFUSION INIT: CPT

## 2019-04-20 PROCEDURE — 70496 CT ANGIOGRAPHY HEAD: CPT

## 2019-04-20 PROCEDURE — 94640 AIRWAY INHALATION TREATMENT: CPT

## 2019-04-20 PROCEDURE — 85025 COMPLETE CBC W/AUTO DIFF WBC: CPT

## 2019-04-20 PROCEDURE — 84165 PROTEIN E-PHORESIS SERUM: CPT

## 2019-04-20 PROCEDURE — 70498 CT ANGIOGRAPHY NECK: CPT

## 2019-04-20 PROCEDURE — 85610 PROTHROMBIN TIME: CPT

## 2019-04-20 PROCEDURE — 83605 ASSAY OF LACTIC ACID: CPT

## 2019-04-20 PROCEDURE — 83036 HEMOGLOBIN GLYCOSYLATED A1C: CPT

## 2019-04-20 PROCEDURE — APPNB45 APP NON BILLABLE 31-45 MINUTES: Performed by: NURSE PRACTITIONER

## 2019-04-20 PROCEDURE — 83874 ASSAY OF MYOGLOBIN: CPT

## 2019-04-20 PROCEDURE — 71045 X-RAY EXAM CHEST 1 VIEW: CPT

## 2019-04-20 PROCEDURE — 82746 ASSAY OF FOLIC ACID SERUM: CPT

## 2019-04-20 PROCEDURE — 82140 ASSAY OF AMMONIA: CPT

## 2019-04-20 PROCEDURE — 1200000000 HC SEMI PRIVATE

## 2019-04-20 PROCEDURE — 70450 CT HEAD/BRAIN W/O DYE: CPT

## 2019-04-20 PROCEDURE — 84443 ASSAY THYROID STIM HORMONE: CPT

## 2019-04-20 PROCEDURE — 93005 ELECTROCARDIOGRAM TRACING: CPT

## 2019-04-20 PROCEDURE — 99285 EMERGENCY DEPT VISIT HI MDM: CPT

## 2019-04-20 PROCEDURE — 84155 ASSAY OF PROTEIN SERUM: CPT

## 2019-04-20 PROCEDURE — 82553 CREATINE MB FRACTION: CPT

## 2019-04-20 PROCEDURE — 85730 THROMBOPLASTIN TIME PARTIAL: CPT

## 2019-04-20 PROCEDURE — 6360000004 HC RX CONTRAST MEDICATION: Performed by: NURSE PRACTITIONER

## 2019-04-20 RX ORDER — 0.9 % SODIUM CHLORIDE 0.9 %
1000 INTRAVENOUS SOLUTION INTRAVENOUS ONCE
Status: COMPLETED | OUTPATIENT
Start: 2019-04-20 | End: 2019-04-20

## 2019-04-20 RX ORDER — CLOPIDOGREL BISULFATE 75 MG/1
300 TABLET ORAL ONCE
Status: COMPLETED | OUTPATIENT
Start: 2019-04-20 | End: 2019-04-20

## 2019-04-20 RX ORDER — FOLIC ACID 1 MG/1
1 TABLET ORAL DAILY
Status: DISCONTINUED | OUTPATIENT
Start: 2019-04-20 | End: 2019-04-22 | Stop reason: HOSPADM

## 2019-04-20 RX ORDER — SODIUM CHLORIDE 0.9 % (FLUSH) 0.9 %
10 SYRINGE (ML) INJECTION EVERY 12 HOURS SCHEDULED
Status: DISCONTINUED | OUTPATIENT
Start: 2019-04-20 | End: 2019-04-22 | Stop reason: HOSPADM

## 2019-04-20 RX ORDER — BUPRENORPHINE AND NALOXONE 12; 3 MG/1; MG/1
1 FILM, SOLUBLE BUCCAL; SUBLINGUAL DAILY
Status: DISCONTINUED | OUTPATIENT
Start: 2019-04-20 | End: 2019-04-20

## 2019-04-20 RX ORDER — ACETAMINOPHEN 325 MG/1
650 TABLET ORAL EVERY 4 HOURS PRN
Status: DISCONTINUED | OUTPATIENT
Start: 2019-04-20 | End: 2019-04-22 | Stop reason: HOSPADM

## 2019-04-20 RX ORDER — ACETAMINOPHEN 325 MG/1
650 TABLET ORAL EVERY 4 HOURS PRN
Status: DISCONTINUED | OUTPATIENT
Start: 2019-04-20 | End: 2019-04-20

## 2019-04-20 RX ORDER — IPRATROPIUM BROMIDE AND ALBUTEROL SULFATE 2.5; .5 MG/3ML; MG/3ML
1 SOLUTION RESPIRATORY (INHALATION) EVERY 4 HOURS PRN
Status: DISCONTINUED | OUTPATIENT
Start: 2019-04-20 | End: 2019-04-22 | Stop reason: HOSPADM

## 2019-04-20 RX ORDER — POTASSIUM CHLORIDE 7.45 MG/ML
20 INJECTION INTRAVENOUS ONCE
Status: COMPLETED | OUTPATIENT
Start: 2019-04-20 | End: 2019-04-20

## 2019-04-20 RX ORDER — CLOPIDOGREL BISULFATE 75 MG/1
75 TABLET ORAL DAILY
Status: DISCONTINUED | OUTPATIENT
Start: 2019-04-21 | End: 2019-04-22 | Stop reason: HOSPADM

## 2019-04-20 RX ORDER — ALBUTEROL SULFATE 2.5 MG/3ML
2.5 SOLUTION RESPIRATORY (INHALATION) EVERY 6 HOURS PRN
Status: DISCONTINUED | OUTPATIENT
Start: 2019-04-20 | End: 2019-04-22 | Stop reason: HOSPADM

## 2019-04-20 RX ORDER — HEPARIN SODIUM 5000 [USP'U]/ML
5000 INJECTION, SOLUTION INTRAVENOUS; SUBCUTANEOUS EVERY 8 HOURS SCHEDULED
Status: DISCONTINUED | OUTPATIENT
Start: 2019-04-20 | End: 2019-04-22 | Stop reason: HOSPADM

## 2019-04-20 RX ORDER — BUPRENORPHINE HYDROCHLORIDE AND NALOXONE HYDROCHLORIDE DIHYDRATE 2; .5 MG/1; MG/1
1 TABLET SUBLINGUAL DAILY
Status: DISCONTINUED | OUTPATIENT
Start: 2019-04-21 | End: 2019-04-22 | Stop reason: HOSPADM

## 2019-04-20 RX ORDER — ATORVASTATIN CALCIUM 80 MG/1
80 TABLET, FILM COATED ORAL NIGHTLY
Status: DISCONTINUED | OUTPATIENT
Start: 2019-04-20 | End: 2019-04-20

## 2019-04-20 RX ORDER — ONDANSETRON 2 MG/ML
4 INJECTION INTRAMUSCULAR; INTRAVENOUS EVERY 6 HOURS PRN
Status: DISCONTINUED | OUTPATIENT
Start: 2019-04-20 | End: 2019-04-22 | Stop reason: HOSPADM

## 2019-04-20 RX ORDER — MAGNESIUM SULFATE 1 G/100ML
1 INJECTION INTRAVENOUS PRN
Status: DISCONTINUED | OUTPATIENT
Start: 2019-04-20 | End: 2019-04-22 | Stop reason: HOSPADM

## 2019-04-20 RX ORDER — SODIUM CHLORIDE 9 MG/ML
INJECTION, SOLUTION INTRAVENOUS CONTINUOUS
Status: DISCONTINUED | OUTPATIENT
Start: 2019-04-20 | End: 2019-04-22 | Stop reason: HOSPADM

## 2019-04-20 RX ORDER — ALBUTEROL SULFATE 2.5 MG/3ML
2.5 SOLUTION RESPIRATORY (INHALATION)
Status: DISCONTINUED | OUTPATIENT
Start: 2019-04-20 | End: 2019-04-20

## 2019-04-20 RX ORDER — ATORVASTATIN CALCIUM 40 MG/1
40 TABLET, FILM COATED ORAL NIGHTLY
Status: DISCONTINUED | OUTPATIENT
Start: 2019-04-20 | End: 2019-04-22 | Stop reason: HOSPADM

## 2019-04-20 RX ORDER — SODIUM CHLORIDE 0.9 % (FLUSH) 0.9 %
10 SYRINGE (ML) INJECTION PRN
Status: DISCONTINUED | OUTPATIENT
Start: 2019-04-20 | End: 2019-04-22 | Stop reason: HOSPADM

## 2019-04-20 RX ORDER — PANTOPRAZOLE SODIUM 40 MG/1
40 TABLET, DELAYED RELEASE ORAL DAILY
Status: DISCONTINUED | OUTPATIENT
Start: 2019-04-20 | End: 2019-04-22 | Stop reason: HOSPADM

## 2019-04-20 RX ORDER — POTASSIUM CHLORIDE 7.45 MG/ML
10 INJECTION INTRAVENOUS PRN
Status: DISCONTINUED | OUTPATIENT
Start: 2019-04-20 | End: 2019-04-20

## 2019-04-20 RX ORDER — ATORVASTATIN CALCIUM 40 MG/1
40 TABLET, FILM COATED ORAL NIGHTLY
Status: DISCONTINUED | OUTPATIENT
Start: 2019-04-20 | End: 2019-04-20

## 2019-04-20 RX ORDER — ALBUTEROL SULFATE 90 UG/1
2 AEROSOL, METERED RESPIRATORY (INHALATION) EVERY 4 HOURS PRN
Status: DISCONTINUED | OUTPATIENT
Start: 2019-04-20 | End: 2019-04-22 | Stop reason: HOSPADM

## 2019-04-20 RX ORDER — POTASSIUM CHLORIDE 20 MEQ/1
40 TABLET, EXTENDED RELEASE ORAL PRN
Status: DISCONTINUED | OUTPATIENT
Start: 2019-04-20 | End: 2019-04-20

## 2019-04-20 RX ORDER — BENZONATATE 100 MG/1
100 CAPSULE ORAL 3 TIMES DAILY PRN
Status: DISCONTINUED | OUTPATIENT
Start: 2019-04-20 | End: 2019-04-22 | Stop reason: HOSPADM

## 2019-04-20 RX ADMIN — HEPARIN SODIUM 5000 UNITS: 5000 INJECTION INTRAVENOUS; SUBCUTANEOUS at 22:44

## 2019-04-20 RX ADMIN — MOMETASONE FUROATE AND FORMOTEROL FUMARATE DIHYDRATE 2 PUFF: 100; 5 AEROSOL RESPIRATORY (INHALATION) at 20:30

## 2019-04-20 RX ADMIN — FOLIC ACID 1 MG: 1 TABLET ORAL at 19:06

## 2019-04-20 RX ADMIN — POTASSIUM CHLORIDE 10 MEQ: 7.46 INJECTION, SOLUTION INTRAVENOUS at 16:57

## 2019-04-20 RX ADMIN — SODIUM CHLORIDE 1000 ML: 9 INJECTION, SOLUTION INTRAVENOUS at 14:05

## 2019-04-20 RX ADMIN — CLOPIDOGREL 300 MG: 75 TABLET, FILM COATED ORAL at 19:04

## 2019-04-20 RX ADMIN — IOHEXOL 90 ML: 350 INJECTION, SOLUTION INTRAVENOUS at 15:37

## 2019-04-20 RX ADMIN — DESMOPRESSIN ACETATE 40 MG: 0.2 TABLET ORAL at 22:36

## 2019-04-20 RX ADMIN — PANTOPRAZOLE SODIUM 40 MG: 40 TABLET, DELAYED RELEASE ORAL at 19:03

## 2019-04-20 RX ADMIN — SODIUM CHLORIDE: 9 INJECTION, SOLUTION INTRAVENOUS at 19:03

## 2019-04-20 RX ADMIN — Medication 10 ML: at 22:33

## 2019-04-20 ASSESSMENT — PAIN SCALES - GENERAL
PAINLEVEL_OUTOF10: 0

## 2019-04-20 ASSESSMENT — ENCOUNTER SYMPTOMS
SHORTNESS OF BREATH: 0
VOMITING: 0
BACK PAIN: 0
ABDOMINAL PAIN: 0
DIARRHEA: 0

## 2019-04-20 NOTE — ED NOTES
Pt refusing straight cath. Pt states she just had lunch and she'll drink some water and be able to void on the floor. Pt state she doesn't want pain of cath. Resident alerted and has warned pt of risks of delaying ATB. Pt states she understands.  Pt in NAD w/ rr even and unlabore.d     Nicolina Boas, RN  04/20/19 3450

## 2019-04-20 NOTE — ED NOTES
Pt arrived to ER via EMS in [facility]. EMS states she was found down unconscious in the hallway of her residence. EMS states blood pressure in the 70's. EMS started an IV and a NaCl bolus. When pt arrived to ER her pressure was 87/52. Pt was alert and oriented X4 when arrived. Pt speech was delayed and it was hard for pt to put together sentences. Pt states she was never unconscious. Pt denies any pain. Pt denies chest pain or SOB. Pt states she thinks this is all from taking a medication this morning. Pt denies any substance abuse. Pt is resting in bed in NAD with even and unlabored rr.  Will continue to monitor       Neelam Morrison RN  04/20/19 1165

## 2019-04-20 NOTE — ED NOTES
Arya Chambers paramedic, unable to obtain IV access w/ ultrasound after multiple attempts. Writer attempted IV access as well and unable to obtain. Resident alerted. Pt to CT via stretcher, in NAD. Will continue to monitor.      Radha Rahman RN  04/20/19 0499

## 2019-04-20 NOTE — CONSULTS
Endovascular Neurosurgery Note  Stroke Priority paged @ [facility] Room # 14  Arrival to patient bedside @ [phone]  4/20/2019 3:16 PM    Pt Name: Kareen Parker  MRN: [id number]  YOB: 1955  Date of evaluation: 4/20/2019  Primary Care Physician: Juan Francisco Sean Daniels is a 75 y.o. female with history of HTN, HLD, COPD, and migraines who presents with altered mental status. Patient's last known well was around midnight. Per records and patient, she was having some back pain and took a Flexeril around 11am and then went to a neighbor's house for a birthday party. The neighbor called 911 because they were concerned she was altered and having difficulty speaking. Patient was found to be hypertensive with SBP in the 70s. On exam, the patient is alert and oriented. She has some mild expressive aphasia and mild left facial droop. She is also having left lower extremity weakness which she reports as chronic. NIH 3. She denies taking any antiplatelets or anticoagulation. She denies any headache, numbness/tingling, weakness, chest pain, or shortness of breath. Allergies  is allergic to aspirin; keflet [cephalexin]; and motrin [ibuprofen]. Medications  Prior to Admission medications    Medication Sig Start Date End Date Taking?  Authorizing Provider   benzonatate (TESSALON PERLES) 100 MG capsule Take 1 capsule by mouth 3 times daily as needed for Cough 3/11/19   Harvinder Lopez DO   carvedilol (COREG) 6.25 MG tablet Take 1 tablet by mouth 2 times daily (with meals) 2/7/19   Nam Dorado MD   ferrous sulfate (MELISSA-HAO) 325 (65 Fe) MG tablet Take 1 tablet by mouth daily 2/7/19   Nam Dorado MD   ondansetron (ZOFRAN ODT) 4 MG disintegrating tablet Take 1 tablet by mouth every 8 hours as needed for Nausea or Vomiting 1/11/19   Emory Davis MD   melatonin 1 MG tablet Take 1 tablet by mouth nightly as needed for Sleep 1/11/19   Emory Davis MD   ipratropium-albuterol (DUONEB) 0.5-2.5 (3) MG/3ML SOLN nebulizer solution Inhale 3 mLs into the lungs every 4 hours as needed for Shortness of Breath 1/11/19   Alin Cherry MD   benzocaine-menthol (CEPACOL SORE THROAT) 15-3.6 MG lozenge Take 1 lozenge by mouth every 2 hours as needed for Sore Throat 1/11/19   Alin Cherry MD   mometasone-formoterol [facility]) 100-5 MCG/ACT inhaler Inhale 2 puffs into the lungs 2 times daily 12/5/18   Zulema Tillman MD   albuterol sulfate HFA (PROVENTIL HFA) 108 (90 Base) MCG/ACT inhaler Inhale 1-2 puffs into the lungs every 4 hours as needed for Wheezing or Shortness of Breath (Space out to every 6 hours as symptoms improve) 12/5/18   Zulema Tillman MD   pantoprazole (PROTONIX) 40 MG tablet Take 1 tablet by mouth 2 times daily (before meals) 11/13/18   Wei Davila MD   folic acid (FOLVITE) 1 MG tablet Take 1 tablet by mouth daily 10/13/18   Guido Fox MD   vitamin B-1 100 MG tablet Take 1 tablet by mouth daily 10/13/18   Guido Fox MD   vitamin B-12 250 MCG tablet Take 1 tablet by mouth daily 10/13/18   Guido Fox MD   docusate sodium (COLACE) 100 MG capsule Take 1 capsule by mouth 2 times daily 10/12/18   Guido Fox MD   gabapentin (NEURONTIN) 300 MG capsule Take 300 mg by mouth 3 times daily. Yosi العلي Historical Provider, MD   buprenorphine-naloxone (SUBOXONE) 12-3 MG sublingual film Place 1 Film under the tongue daily. Yosi العلي     Historical Provider, MD   guaiFENesin (MUCINEX) 600 MG extended release tablet Take 1 tablet by mouth 2 times daily 9/13/18   Maria Fernanda Steve MD   acetaminophen (APAP EXTRA STRENGTH) 500 MG tablet Take 2 tablets by mouth every 8 hours as needed for Pain 8/12/18   Ken Barnes MD   Multiple Vitamins-Minerals (THERAPEUTIC MULTIVITAMIN-MINERALS) tablet Take 1 tablet by mouth daily 8/12/18   Ken Barnes MD    Scheduled Meds:  Continuous Infusions:  PRN Meds:.iohexol    Past Medical History   has a past medical history of Appendicitis, CHF (congestive heart failure) ([facility] [address] Chronic respiratory failure with hypoxia and hypercapnia (HCC), Chronic rhinitis, Cigarette smoker, COPD (chronic obstructive pulmonary disease) ([facility] [address] Depression, Dysphagia, Essential hypertension, GERD (gastroesophageal reflux disease), Heart failure, diastolic, with acute decompensation ([facility] [address] Hep[facility] C, chronic [facility] History of smoking at least 1 pack per day for at least 30 years, Hyperlipidemia, Hypertension, Migraine, Moderate COPD (chronic obstructive pulmonary disease) ([facility] [address] Multiple transfusions, Osteoarthritis, Pneumonia, Scoliosis, and Substance abuse ([facility] OBJECTIVE  BP (!) 107/58   Pulse 88   Temp 97.9 °F (36.6 °C)   Resp 14   LMP 03/18/2015   SpO2 97%     ROS  CONSTITUTIONAL: negative for fatigue and malaise   EYES: negative for double vision and photophobia    HEENT: negative for tinnitus and sore throat   RESPIRATORY: negative for cough, shortness of breath   CARDIOVASCULAR: negative for chest pain, palpitations   GASTROINTESTINAL: negative for nausea, vomiting   GENITOURINARY: negative for incontinence   MUSCULOSKELETAL: negative for neck or back pain   NEUROLOGICAL: negative for seizures; positive for aphasia   PSYCHIATRIC: negative for agitated     Review of systems otherwise negative. EXAM:    CONSTITUTIONAL:  Well developed, well nourished, alert and oriented x 3, in no acute distress. GCS 15. Nontoxic. No dysarthria. Mild expressive aphasia.    HEAD:  normocephalic, atraumatic    EYES:  PERRLA, EOMI.   ENT:  moist mucous membranes   NECK:  supple, symmetric   LUNGS:  Equal air entry bilaterally   CARDIOVASCULAR:  normal s1 / s2   ABDOMEN:  Soft, no rigidity   NEUROLOGIC:  Mental Status:  A & O x3,awake             Cranial Nerves:    II: Visual fields:  normal  III: Pupils:  equal, round, reactive to light  III,IV,VI: Extra Ocular Movements: intact  V: Facial sensation:  intact  VII: Facial strength: abnormal mild left nasolabial fold flattening that corrects with smiling    Motor Exam:    Drift:  present - right lower extremity  Tone:  normal    Motor exam is 5 out of 5 all extremities with the exception of 3 out of 5 right lower    Sensory:    Touch:    Right Upper Extremity:  normal  Left Upper Extremity:  normal  Right Lower Extremity:  normal  Left Lower Extremity:  normal     SKIN:  no rash        INITIAL NIH STROKE SCALE    Time Performed:  8874 PM    Administer stroke scale items in the order listed. Record performance in each category after each subscale exam. Do not go back and change scores. Follow directions provided for each exam technique. Scores should reflect what the patient does, not what the clinician thinks the patient can do. The clinician should record answers while administering the exam and work quickly. Except where indicated, the patient should not be coached (i.e., repeated requests to patient to make a special effort). 1a.  Level of consciousness:  0 - alert; keenly responsive  1b. Level of consciousness questions:  0 - answers both questions correctly  1c. Level of consciousness questions:  0 - performs both tasks correctly  2. Best Gaze:  0 - normal  3. Visual:  0 - no visual loss  4. Facial Palsy:  0 - normal symmetric movement  5a. Motor left arm:  0 - no drift, limb holds 90 (or 45) degrees for full 10 seconds  5b. Motor right arm:  0 - no drift, limb holds 90 (or 45) degrees for full 10 seconds  6a. Motor left le - no drift; leg holds 30 degree position for full 5 seconds  6b. Motor right le - some effort against gravity; leg falls to bed by 5 seconds but has some effort against gravity  7. Limb Ataxia:  0 - absent  8. Sensory:  0 - normal; no sensory loss  9. Best Language:  1 - mild to moderate aphasia; some obvious loss of fluency or facility of comprehension without significant limitation on ideas expressed or form of expression.   Reduction of speech and/or comprehension, however, makes conversation about provided

## 2019-04-20 NOTE — ED NOTES
Pt returned from CT. Pt resting in bed in NAD with even and unlabored rr.  Will continue to monitor     Darylene Medin, RN  04/20/19 2070

## 2019-04-20 NOTE — ED NOTES
Labs obtained and sent to lab for testing. Morgan Oh, paramedic, able to obtain IV access. Pt to CT for further scanning. Pt in NAD w/ rr evne and unlabored. Will continue to monitor.      Niyah Clinton RN  04/20/19 5624

## 2019-04-20 NOTE — ED NOTES
Bed: 14  Expected date:   Expected time:   Means of arrival:   Comments:     Walter Tucker RN  04/20/19 0849

## 2019-04-20 NOTE — ED NOTES
Pt pt on bedside commode. Pt could not urinate. Pt now resting bed in NAD with even and unlabored rr.  Will continue to monitor     Sanket Pal RN  04/20/19 9947

## 2019-04-20 NOTE — FLOWSHEET NOTE
[facility] CARE DEPARTMENT - [address]     Emergency/Trauma Note    PATIENT NAME: Emi Daniels    Shift date: 4/20/19  Shift day: Saturday   Shift # 1    Room # 14/14   Name: Regina Daniels            Age: 75 y.o. Gender: female          Restorationist: Non-Protestant   Place of Judaism:     Trauma/Incident type: Stroke Alert  Admit Date & Time: 4/20/2019  1:34 PM  TRAUMA NAME:        PATIENT/EVENT DESCRIPTION:  Emi Daniels is a 75 y.o. female who arrived via [employer]. Call paged as a Stroke Alert. Pt to be admitted to 14/14. SPIRITUAL ASSESSMENT/INTERVENTION:  Emi was a little upset that her friend had called the rescue squad on her birthday. Emi was very accepting of all that was being done to help her. Patient stated her friend did the right thing after talking about the situation. Patient asked  to call her son Miguelangel Abad at [phone].  spoke to [facility] who said he would be coming to the hospital.   provided active, compassionate and empathetic listening. Patient openly share life review and engaged  in conversation. Patient expressed gratitude to  for prayer and support. Chaplains remain available for spiritual and emotional support as needed and may be paged for a specific request at any time. PATIENT BELONGINGS:  With patient    ANY BELONGINGS OF SIGNIFICANT VALUE NOTED:  None noted    REGISTRATION STAFF NOTIFIED? Yes      WHAT IS YOUR SPIRITUAL CARE PLAN FOR THIS PATIENT?:   Chaplains remain available for spiritual and emotional support as needed.       Electronically signed by Sarah Suarez on 4/20/2019 at 2:41 PM.  Star Kingsley  [phone]

## 2019-04-20 NOTE — ED PROVIDER NOTES
[facility] ED     Emergency Department     Faculty Attestation        I performed a history and physical examination of the patient and discussed management with the resident. I reviewed the residents note and agree with the documented findings and plan of care. Any areas of disagreement are noted on the chart. I was personally present for the key portions of any procedures. I have documented in the chart those procedures where I was not present during the key portions. I have reviewed the emergency nurses triage note. I agree with the chief complaint, past medical history, past surgical history, allergies, medications, social and family history as documented unless otherwise noted below. For Physician Assistant/ Nurse Practitioner cases/documentation I have personally evaluated this patient and have completed at least one if not all key elements of the E/M (history, physical exam, and MDM). Additional findings are as noted. Vital Signs: BP: (!) 87/52  Pulse: 93  Resp: 16  Temp: 97.9 °F (36.6 °C) SpO2: 98 %  PCP:  Laurel Medrano    Pertinent Comments:     Patient is a 77-year-old female with history of COPD as well as hypertension was last normal last night at midnight when she went to bed. She states that she woke up this morning with some difficulty finding words and what appears to be nasolabial flattening on the left face. Denies any weakness or numbness/tingling in the extremities. Denies chest pain/back pain/shortness of breath/abdominal pain. Physical examination: Clear to auscultation bilaterally, heart regular rate and rhythm, abdomen soft/nontender. Neurologic examination has 5/5 strength in bilateral upper and lower extremities with sensation light touch intact and Babinski's downgoing bilaterally. Finger to nose is intact bilaterally.    Cranial nerves do show left nasolabial flattening that can be overcome by smile but also what appears to be a possible mild expressive aphasia and difficulty finding some words. Assessment/plan: Clearly not a TPA candidate with onset of symptoms at least 14 hours ago. But given less than 24 hours will have stroke priority called. We will give IV fluids judiciously but appears mildly dehydrated on mucous membranes and clearly no fluid overload at this time. IV tPA: 1. The patient arrived within two (2) hours of their last known well time: No  2. IV-tPA therapy was initiated within three (3) hours of their last known well time?: No because other clinical reason for not providing IV-tPA was outside of lytic window. EKG Interpretation    Interpreted by emergency department physician    Rhythm: normal sinus   Rate: normal at 96 bpm  Axis: normal  Conduction: normal  ST Segments: no acute change  T Waves: no acute change  Q Waves: no acute change    Clinical Impression:  nonspecific EKG. CRITICAL CARE: There was a high probability of clinically significant/life threatening deterioration in this patient's condition which required my urgent intervention. Total critical care time was 10 minutes. This excludes any time for separately reportable procedures. (Please note that portions of this note were completed with a voice recognition program. Efforts were made to edit the dictations but occasionally words are mis-transcribed.  Whenever words are used in this note in any gender, they shall be construed as though they were used in the gender appropriate to the circumstances; and whenever words are used in this note in the singular or plural form, they shall be construed as though they were used in the form appropriate to the circumstances.)    Sofiya Cotton MD [facility]  Attending Emergency Medicine Physician            Nelly Rice MD  04/20/19 [id number] [facility] MD [place]  04/20/19 [id number]

## 2019-04-20 NOTE — H&P
[facility]     Department of Internal Medicine - Staff Internal Medicine Service          ADMISSION NOTE/HISTORY AND PHYSICAL EXAMINATION       CHIEF COMPLAINT:    Chief Complaint   Patient presents with    Altered Mental Status       HISTORY OBTAIN FROM:  Patient     HISTORY OF PRESENT ILLNESS:      The patient is a pleasant 75 y.o. female who was brought to the emergency department after being found down by her friend. Patient stated that she did not know how she was found down and she was brought to the emergency department. Patient stated that she did not hit her head and she was lying on her bed. Patient stated that she had no episode of bowel or bladder incontinence, no frothing, no aura, no blackening in front of the eyes. Patient is a poor historian. Patient stated that she had poor oral intake and fluid intake since the last couple of days and was unsteady on her feet. However she stated that she was able to walk with the help of walker. Patient stated that she also had episodes of loose stools and vomiting in the last couple of days. Patient stated that she did not have any fever or chills. Patient denied any history of sick contacts. Patient denied any history of recent travels. Patient also had a presentation of difficulty articulating her words. On evaluation she had left-sided facial droop, expressive aphasia. In the emergency department, patient was given fluid bolus as her blood pressure was running low. Because of her left-sided facial droop and expressive aphasia, stroke priority was called and stroke workup was ordered, CT head was ordered. Creatinine levels elevated at 2.27. Potassium levels 3.2. Myoglobin 86 CK 76. Troponins 21. Hemoglobin 11.8.     Medical History:   Past Medical History:   Diagnosis Date    Appendicitis 3/27/2017    CHF (congestive heart failure) (HCC)     Chronic respiratory failure with hypoxia and hypercapnia (HCC)     Chronic rhinitis     Cigarette smoker     COPD (chronic obstructive pulmonary disease) ([facility] [address] 2018    Depression     Dysphagia     Essential hypertension 2018    GERD (gastroesophageal reflux disease)     Heart failure, diastolic, with acute decompensation ([facility] [address] 2018    Hepatitis C, chronic (HCC)     History of smoking at least 1 pack per day for at least 30 years     Hyperlipidemia     Hypertension     Migraine     Moderate COPD (chronic obstructive pulmonary disease) (HCC)     Multiple transfusions     Osteoarthritis     hands    Pneumonia     Scoliosis     Substance abuse ([facility] [address]     ivda and cocaine abuse in past       SURGICAL HISTORY:  Past Surgical History:   Procedure Laterality Date    APPENDECTOMY  2017     SECTION      GASTRIC BYPASS SURGERY      LAPAROSCOPIC APPENDECTOMY N/A 3/27/2017    APPENDECTOMY LAPAROSCOPIC performed by Ladonna Choi MD at [address] [place] [address]      [email or web address]    CA COLONOSCOPY W/BIOPSY SINGLE/MULTIPLE N/A 11/10/2018    COLONOSCOPY WITH BIOPSY performed by Teresa Mullins MD at [facility]    CA EGD TRANSORAL BIOPSY SINGLE/MULTIPLE N/A 2018    EGD ESOPHAGOGASTRODUODENOSCOPY performed by Maryann Gasca MD at [address] [place] [address]      oesophagitis, candidiasis    UPPER GASTROINTESTINAL ENDOSCOPY  2018    UPPER GASTROINTESTINAL ENDOSCOPY N/A 2018    EGD CONTROL HEMORRHAGE performed by Maryann Gasca MD at [address] [place] [address]:  Prior to Admission medications    Medication Sig Start Date End Date Taking?  Authorizing Provider   ipratropium-albuterol (DUONEB) 0.5-2.5 (3) MG/3ML SOLN nebulizer solution Inhale 3 mLs into the lungs every 4 hours as needed for Shortness of Breath 19  Yes Jos Barger MD   albuterol sulfate HFA (PROVENTIL HFA) 108 (90 Base) MCG/ACT inhaler Inhale 1-2 puffs into the lungs every 4 hours as needed for Wheezing or Shortness of Breath (Space out to every 6 hours as symptoms improve) 12/5/18  Yes Miriam Fernandez MD   acetaminophen (APAP EXTRA STRENGTH) 500 MG tablet Take 2 tablets by mouth every 8 hours as needed for Pain 8/12/18  Yes Madeleine Bethea MD   benzonatate (TESSALON PERLES) 100 MG capsule Take 1 capsule by mouth 3 times daily as needed for Cough 3/11/19   Danay Lopez DO   carvedilol (COREG) 6.25 MG tablet Take 1 tablet by mouth 2 times daily (with meals) 2/7/19   Constance Chairez MD   ferrous sulfate (MELISSA-HAO) 325 (65 Fe) MG tablet Take 1 tablet by mouth daily 2/7/19   Constance Chairez MD   ondansetron (ZOFRAN ODT) 4 MG disintegrating tablet Take 1 tablet by mouth every 8 hours as needed for Nausea or Vomiting 1/11/19   Daly Al MD   melatonin 1 MG tablet Take 1 tablet by mouth nightly as needed for Sleep 1/11/19   Daly Al MD   benzocaine-menthol (CEPACOL SORE THROAT) 15-3.6 MG lozenge Take 1 lozenge by mouth every 2 hours as needed for Sore Throat 1/11/19   Daly Al MD   mometasone-formoterol [facility]) 100-5 MCG/ACT inhaler Inhale 2 puffs into the lungs 2 times daily 12/5/18   Miriam Fernandez MD   pantoprazole (PROTONIX) 40 MG tablet Take 1 tablet by mouth 2 times daily (before meals) 11/13/18   Wilma Irene MD   folic acid (FOLVITE) 1 MG tablet Take 1 tablet by mouth daily 10/13/18   Marco Antonio Hernandez MD   vitamin B-1 100 MG tablet Take 1 tablet by mouth daily 10/13/18   Marco Antonio Hernandez MD   vitamin B-12 250 MCG tablet Take 1 tablet by mouth daily 10/13/18   Marco Antonio Hernandez MD   docusate sodium (COLACE) 100 MG capsule Take 1 capsule by mouth 2 times daily 10/12/18   Marco Antonio Hernandez MD   gabapentin (NEURONTIN) 300 MG capsule Take 300 mg by mouth 3 times daily. Matt Dye MD   buprenorphine-naloxone (SUBOXONE) 12-3 MG sublingual film Place 1 Film under the tongue daily. Matt Graf     Historical Provider, MD   guaiFENesin (MUCINEX) 600 MG extended release tablet Take 1 tablet by mouth 2 times daily 9/13/18   Dash Sibley MD Multiple Vitamins-Minerals (THERAPEUTIC MULTIVITAMIN-MINERALS) tablet Take 1 tablet by mouth daily 18   Camryn Bhagat MD     Scheduled Meds:   sodium chloride flush  10 mL Intravenous 2 times per day    buprenorphine-naloxone  1 Film Sublingual Daily    folic acid  1 mg Oral Daily    mometasone-formoterol  2 puff Inhalation BID    pantoprazole  40 mg Oral Daily    heparin (porcine)  5,000 Units Subcutaneous 3 times per day    sodium chloride flush  10 mL Intravenous 2 times per day    clopidogrel  300 mg Oral Once    [START ON 2019] clopidogrel  75 mg Oral Daily    atorvastatin  80 mg Oral Nightly     Continuous Infusions:   sodium chloride       PRN Meds:sodium chloride flush, albuterol sulfate HFA, benzonatate, ipratropium-albuterol, sodium chloride flush, magnesium sulfate, magnesium hydroxide, ondansetron, acetaminophen, albuterol    Allergies   Allergen Reactions    Aspirin Itching    Keflet [Cephalexin]      Tolerated penicillin V , tolerates cephalexin     Motrin [Ibuprofen]        SOCIAL HISTORY:   Social History     Socioeconomic History    Marital status: Single     Spouse name: Not on file    Number of children: Not on file    Years of education: Not on file    Highest education level: Not on file   Occupational History    Not on file   Social Needs    Financial resource strain: Not on file    Food insecurity:     Worry: Not on file     Inability: Not on file    Transportation needs:     Medical: Not on file     Non-medical: Not on file   Tobacco Use    Smoking status: Former Smoker     Packs/day: 1.00     Years: 40.00     Pack years: 40.00     Types: Cigarettes     Start date:      Last attempt to quit: 2018     Years since quittin.4    Smokeless tobacco: Never Used   Substance and Sexual Activity    Alcohol use: No     Alcohol/week: 0.0 oz    Drug use: No     Types: IV, Cocaine     Comment: past history    Sexual activity: Not on file   Lifestyle  Physical activity:     Days per week: Not on file     Minutes per session: Not on file    Stress: Not on file   Relationships    Social connections:     Talks on phone: Not on file     Gets together: Not on file     Attends Sikhism service: Not on file     Active member of club or organization: Not on file     Attends meetings of clubs or organizations: Not on file     Relationship status: Not on file    Intimate partner violence:     Fear of current or ex partner: Not on file     Emotionally abused: Not on file     Physically abused: Not on file     Forced sexual activity: Not on file   Other Topics Concern    Not on file   Social History Narrative    Not on file       FAMILY HISTORY:   History reviewed. No pertinent family history. REVIEW OF SYSTEMS:  · Constitutional: Negative for Fever, chills  · Eyes: Negative for visual changes, diplopia  · ENT: Negative for mouth sores, sore throat. · Cardiovascular: Negative for lightheadedness ,chest pain, palpitations   · Respiratory:Negative for Shortness of breath,cough or wheezing. · Gastrointestinal: Negative for nausea/vomiting, change in bowel habits, abdominal pain  · Genitourinary:Negative for change in bladder habits, dysuria, hematuria. · Musculoskeletal: Negative for joint pain   · Neurological: Negative for headache, change in muscle strength numbness/tingling  · Psychiatric: negative for change in mood, affect      PHYSICAL EXAM:  /63   Pulse 95   Temp 97.8 °F (36.6 °C) (Oral)   Resp 16   Ht 5' (1.524 m)   Wt 111 lb 5.3 oz (50.5 kg)   LMP 03/18/2015   SpO2 96%   BMI 21.74 kg/m²    Wt Readings from Last 3 Encounters:   04/20/19 111 lb 5.3 oz (50.5 kg)   03/11/19 140 lb (63.5 kg)   02/07/19 132 lb 4.4 oz (60 kg)       Physical Exam   Constitutional: She is oriented to person, place, and time. She appears well-developed and well-nourished. She appears distressed. HENT:   Head: Normocephalic.    Slight facial droop on the left side, expressive aphasia   Eyes: Pupils are equal, round, and reactive to light. Neck: Normal range of motion. No thyromegaly present. Cardiovascular: Normal rate, regular rhythm, normal heart sounds and intact distal pulses. Exam reveals no gallop and no friction rub. No murmur heard. Pulmonary/Chest: Effort normal and breath sounds normal. No respiratory distress. She has no wheezes. Abdominal: Soft. Bowel sounds are normal. She exhibits no distension. There is no tenderness. There is no rebound. No hernia. Musculoskeletal: Normal range of motion. She exhibits no edema. Weakness bilaterally in the lower extremities   Lymphadenopathy:     She has no cervical adenopathy. Neurological: She is alert and oriented to person, place, and time. Skin: Skin is warm. Capillary refill takes less than 2 seconds. She is not diaphoretic. Psychiatric: She has a normal mood and affect. Her behavior is normal. Judgment and thought content normal.       LABS:  CBC:   Recent Labs     04/20/19  1526   WBC 8.0   RBC 4.59   HGB 11.8*   HCT 40.1   MCV 87.4   RDW 20.4*   *     BMP:   Recent Labs     04/20/19  1526      K 3.2*      CO2 20   BUN 20   CREATININE 2.27*     PT/INR:   Recent Labs     04/20/19  1526   PROTIME 11.1   INR 1.1     APTT:   Recent Labs     04/20/19  1526   APTT 22.7     CARDIAC ENZYMES:   Recent Labs     04/20/19  1526   CKMB 1.1     FASTING LIPID PANEL:  Lab Results   Component Value Date    CHOL 141 09/06/2018    HDL 80 09/06/2018    TRIG 102 09/06/2018           ASSESSMENT:     Active Problems:    Hepatitis C    History of substance abuse    COPD (chronic obstructive pulmonary disease) (HCC)    Acute kidney injury ([facility] [address]  Resolved Problems:    * No resolved hospital problems. *        PLAN:     1. Concern for ischemic stroke. Etiology unknown as of now. Left-sided facial droop and expressive aphasia. Bilateral upper extremity strength normal.  Lower extremity strength 3/5.   CT unremarkable. Neurology on board . Obtain echo. Plavix 300 mg now and then 75 mg daily. No aspirin as the patient has aspirin allergy. Folate 1 mg twice a day. Lipitor 40. Obtain fasting lipid panel, TSH, HbA1c, B12, PT OT, speech eval.  Hydration with IV fluids at 125 mL per hour normal saline. Neuro checks per protocol. 2. CRYSTAL. Likely due to dehydration. Patient was given contrast to obtain head and neck CT imaging in the ED. urinalysis with microscopic, urinalysis reflex to culture, Obtain Fe-Na. Urine protein creatinine ratio. Urine eosinophils. Renal ultrasound. Continue hydration with IV fluids at 125 ML per hour normal saline. Follow with CBC and BMP daily. 3. Concern for multiple myeloma. Multiple small lucent lesions noted throughout cervical spine on imaging. Could be due to or osseous metastasis/multiple myeloma/ severe osteopenia. Obtain SPEP, UPEP. CBC and BMP daily. 4.  Elevated troponin. 23-->21. Trend troponins. Could be due to demand ischemia. Obtain EKG. 5. History of COPD. Continue breathing treatment. DuoNeb. Dulera. RT eval and treat. RT aerosol protocol. 6. History of polysubstance abuse. Obtain drug screen. 7. History of nicotine dependence. Smoking cessation counseling. 8.  Thyroid nodules. Heterogeneously enlarged thyroid gland with multiple thyroid nodules noted on CT neck. Obtain TSH. 9.  History of hypertension. Patient on Coreg 6.25 twice a day at home. Blood pressures on the lower side for now. Resume as needed. BP checks per protocol. 10.  History of hepatitis C.        DVT prophylaxis: heparin  'Diet: general  PT/OT evaluation  Disposition: med-surg/ Step down    Richie Apgar, M.D.   Internal Medicine Resident , PGY-1  [facility]  19 7:00 PM        Attending Physician Statement  I have discussed the care of Emi Daniels, including pertinent history and exam findings with the resident. I have reviewed the key elements of all parts of the encounter with the resident. I have seen and examined the patient with the resident and the key elements of all parts of the encounter have been performed by me.         Gabe Guerrero MD  Attending and Faculty Internal Medicine  [address] [place] [address]   4/21/19

## 2019-04-20 NOTE — PROGRESS NOTES
Patient arrived to the unit from ER. Writer told patient hadn't voided yet. Writer bladder scanned for 400 ml. Patient refusing straight cath and states she will void on her own. Patient voids 100 ml and  ml. Patient still continues to refuse catheterization. Will continue to monitor.

## 2019-04-20 NOTE — ED PROVIDER NOTES
[facility]  Emergency Department  Emergency Medicine Resident Sign-out     Care of Emi Daniels was assumed from Dr. Sonido Hannon and is being seen for Altered Mental Status  . The patient's initial evaluation and plan have been discussed with the prior provider who initially evaluated the patient.      EMERGENCY DEPARTMENT COURSE / MEDICAL DECISION MAKING:       MEDICATIONS GIVEN:  ED Medication Orders (From admission, onward)    Start Ordered     Status Ordering Provider    04/20/19 1630 04/20/19 1626  potassium chloride 10 mEq/100 mL IVPB (Peripheral Line)  ONCE      Last MAR action:  New Bag - by Joe Isabel on 04/20/19 at [address]    04/20/19 1444 04/20/19 1444  iohexol (OMNIPAQUE 350) solution 90 mL  IMG ONCE PRN      Last MAR action:  Given - by Ladonna Archuleta on 04/20/19 at [facility]    04/20/19 1415 04/20/19 1400  0.9 % sodium chloride bolus  ONCE      Last MAR action:  New Bag - by Joe Isabel on 04/20/19 at [facility]          LABS / RADIOLOGY:     Labs Reviewed   STROKE PANEL - Abnormal; Notable for the following components:       Result Value    Hemoglobin 11.8 (*)     RDW 20.4 (*)     Platelets 265 (*)     Immature Granulocytes 1 (*)     Seg Neutrophils 70 (*)     Lymphocytes 20 (*)     Myoglobin 86 (*)     Troponin, High Sensitivity 23 (*)     CREATININE 2.27 (*)     Potassium 3.2 (*)     GFR Non- 22 (*)     GFR  26 (*)     All other components within normal limits   TROPONIN - Abnormal; Notable for the following components:    Troponin, High Sensitivity 21 (*)     All other components within normal limits   URINE RT REFLEX TO CULTURE   PREVIOUS SPECIMEN       Ct Head Wo Contrast    Result Date: 4/20/2019  EXAMINATION: CT OF THE HEAD WITHOUT CONTRAST  4/20/2019 2:07 pm TECHNIQUE: CT of the head was performed without the administration of intravenous contrast. Dose modulation, iterative reconstruction, and/or weight based adjustment of the mA/kV was utilized to reduce the radiation dose to as low as reasonably achievable. COMPARISON: November 7, 2018 HISTORY: ORDERING SYSTEM PROVIDED HISTORY: AMS, facial droop, expressive aphasia TECHNOLOGIST PROVIDED HISTORY: FINDINGS: BRAIN/VENTRICLES: There is no acute intracranial hemorrhage, mass effect or midline shift. No abnormal extra-axial fluid collection. The gray-white differentiation is maintained without evidence of an acute infarct. There is prominence of the ventricles and sulci due to global parenchymal volume loss. There are nonspecific areas of hypoattenuation within the periventricular and subcortical white matter, which likely represent chronic microvascular ischemic change. ORBITS: The visualized portion of the orbits demonstrate no acute abnormality. SINUSES: Air-fluid levels within the bilateral maxillary sinuses. The mastoid air cells are clear. Chronic appearing fracture involving the right medial orbital wall. SOFT TISSUES/SKULL: No acute abnormality of the visualized skull or soft tissues. No acute intracranial abnormality. Air-fluid levels within the bilateral maxillary sinuses. Correlate with symptoms of sinusitis. Critical results were called by Dr. Emory Rico MD to Dr. El Helton On 4/20/2019 at 15:01. Xr Chest Portable    Result Date: 4/20/2019  EXAMINATION: SINGLE XRAY VIEW OF THE CHEST 4/20/2019 2:11 pm COMPARISON: Chest radiograph dated 03/11/2019 and 11/04/2018. Chest CT dated 01/07/2019 HISTORY: ORDERING SYSTEM PROVIDED HISTORY: AMS TECHNOLOGIST PROVIDED HISTORY: AMS FINDINGS: Cardiac and mediastinal shadows are normal.  No infiltrates. No effusions. No pneumothorax. No free air below the diaphragm. There are multiple linear foreign bodies within the soft tissues above bilateral clavicular heads similar to multiple prior exams.      No acute findings in the chest. Multiple linear foreign bodies within the soft tissues above bilateral clavicular heads similar to multiple prior exams. Cta Neck W Contrast    Result Date: 4/20/2019  EXAMINATION: CTA OF THE HEAD WITH CONTRAST; CTA OF THE NECK 4/20/2019 2:44 pm: TECHNIQUE: CTA of the head/brain was performed with the administration of intravenous contrast. Multiplanar reformatted images are provided for review. MIP images are provided for review. Dose modulation, iterative reconstruction, and/or weight based adjustment of the mA/kV was utilized to reduce the radiation dose to as low as reasonably achievable.; CTA of the neck was performed with the administration of intravenous contrast. Multiplanar reformatted images are provided for review. MIP images are provided for review. Stenosis of the internal carotid arteries measured using NASCET criteria. Dose modulation, iterative reconstruction, and/or weight based adjustment of the mA/kV was utilized to reduce the radiation dose to as low as reasonably achievable. COMPARISON: None. HISTORY: ORDERING SYSTEM PROVIDED HISTORY: expressive aphasia TECHNOLOGIST PROVIDED HISTORY: Ordering Physician Provided Reason for Exam: r/o stroke Acuity: Acute; ORDERING SYSTEM PROVIDED HISTORY: expressive aphasia TECHNOLOGIST PROVIDED HISTORY: FINDINGS: CTA NECK: AORTIC ARCH/ARCH VESSELS: No significant stenosis is seen of the innominate artery or subclavian arteries. CAROTID ARTERIES: The common carotid arteries are normal in appearance without evidence of a flow limiting stenosis. The internal carotid arteries are normal in appearance without evidence of a flow limiting stenosis by NASCET criteria. No dissection or arterial injury is seen. VERTEBRAL ARTERIES: The vertebral arteries both arise from the subclavian arteries and are normal in caliber without evidence of flow limiting stenosis or dissection. SOFT TISSUES: Emphysematous changes at the lung apices. No evidence of superior mediastinal lymphadenopathy.   Thyroid gland is heterogeneously enlarged with multiple thyroid nodules. The nasopharynx, oral cavity, oropharynx, hypopharynx, and laryngeal structures are grossly unremarkable. The parotid glands and submandibular glands are unremarkable. Multiple left supraclavicular lymph nodes are not enlarged by individual size criteria but are conspicuous in numbers. There are left level V lymph nodes which are also conspicuous in numbers. No evidence of right cervical lymphadenopathy. No evidence of retropharyngeal fluid or abscess. BONES: Multilevel degenerative changes of the cervical spine. Grade 1 anterolisthesis of C2 in relation to C3. Heterogeneous appearance of the osseous marrow. Multiple small lucent lesions throughout the cervical spine. CTA HEAD: ANTERIOR CIRCULATION: Mild atherosclerotic disease involving the intracranial internal carotid arteries. No flow-limiting stenosis. The anterior cerebral and middle cerebral arteries demonstrate no focal stenosis. POSTERIOR CIRCULATION: The posterior cerebral arteries demonstrate no focal stenosis. The vertebral and basilar arteries appear unremarkable. BRAIN: No mass effect or midline shift. No abnormal extra-axial fluid collection. The gray-white differentiation appears grossly maintained. No high-grade stenosis or focal occlusion involving the intracranial or cervical vasculature. No evidence of acute dissection. No evidence of aneurysm. Heterogeneous appearance of the osseous structures with multiple tiny lucent lesions. While this could be secondary to severe osteopenia, possibility of osseous metastasis or multiple myeloma should be excluded. Recommend nonemergent nuclear medicine bone scan. Multiple nonenlarged but conspicuous left supraclavicular and left level V lymph nodes. These are asymmetric compared to the right. While this may be reactive or inflammatory, consider short interval follow-up to document resolution. Heterogeneously enlarged thyroid gland with multiple thyroid nodules. Recommend nonemergent thyroid ultrasound. Emphysema. Cta Head W Contrast    Result Date: 4/20/2019  EXAMINATION: CTA OF THE HEAD WITH CONTRAST; CTA OF THE NECK 4/20/2019 2:44 pm: TECHNIQUE: CTA of the head/brain was performed with the administration of intravenous contrast. Multiplanar reformatted images are provided for review. MIP images are provided for review. Dose modulation, iterative reconstruction, and/or weight based adjustment of the mA/kV was utilized to reduce the radiation dose to as low as reasonably achievable.; CTA of the neck was performed with the administration of intravenous contrast. Multiplanar reformatted images are provided for review. MIP images are provided for review. Stenosis of the internal carotid arteries measured using NASCET criteria. Dose modulation, iterative reconstruction, and/or weight based adjustment of the mA/kV was utilized to reduce the radiation dose to as low as reasonably achievable. COMPARISON: None. HISTORY: ORDERING SYSTEM PROVIDED HISTORY: expressive aphasia TECHNOLOGIST PROVIDED HISTORY: Ordering Physician Provided Reason for Exam: r/o stroke Acuity: Acute; ORDERING SYSTEM PROVIDED HISTORY: expressive aphasia TECHNOLOGIST PROVIDED HISTORY: FINDINGS: CTA NECK: AORTIC ARCH/ARCH VESSELS: No significant stenosis is seen of the innominate artery or subclavian arteries. CAROTID ARTERIES: The common carotid arteries are normal in appearance without evidence of a flow limiting stenosis. The internal carotid arteries are normal in appearance without evidence of a flow limiting stenosis by NASCET criteria. No dissection or arterial injury is seen. VERTEBRAL ARTERIES: The vertebral arteries both arise from the subclavian arteries and are normal in caliber without evidence of flow limiting stenosis or dissection. SOFT TISSUES: Emphysematous changes at the lung apices. No evidence of superior mediastinal lymphadenopathy.   Thyroid gland is heterogeneously enlarged with Recommend nonemergent thyroid ultrasound. Emphysema. RECENT VITALS:     Temp: 97.9 °F (36.6 °C),  Pulse: 89, Resp: 10, BP: 118/65, SpO2: 94 %    This patient is a 75 y.o. Female with altered mental status, LKW 0000 when going to bed. Expressive aphasia and mild left sided facial droop. Unclear history. Found by neighbor. Brought in by EMS. Stroke priority. CTA head/neck w/ questionable lucency in skull. No CVA on imaging. Clinical dehydration. CRYSTAL. Awaiting urine      OUTSTANDING TASKS / RECOMMENDATIONS:    1. Urine. 2. Bed placement     I discussed with patient that we needed urine. I recommended we give straight cath. However patient stated that she would like to wait until she gets upstairs to try to urinate. I did discuss with her that if she doesn't be UTI, the longer we weight without treatment the higher risk of side effects including bacteremia, pyelonephritis, and altered mental status. She understands this and would like to wait until the floor. Therefore hold off on straight cath at this time. FINAL IMPRESSION:     1. Acute kidney injury ([address]    2. Cerebrovascular accident (CVA), unspecified mechanism ([address]    3. Hypokalemia    4.  Hypotension due to hypovolemia        DISPOSITION:         DISPOSITION:  []  Discharge   []  Transfer -    [x]  Admission -  Internal Medicine    []  Against Medical Advice   []  Eloped   FOLLOW-UP: Clinton Angelucci  [address] [facility]  [phone]           DISCHARGE MEDICATIONS: New Prescriptions    No medications on file          Trevor Cavanaugh MD  Emergency Medicine Resident  [facility]        Trevor Cavanaugh MD  Resident  04/21/19 [id number]

## 2019-04-20 NOTE — ED PROVIDER NOTES
Care assumed from Dr. Shanti Lowe,     Pt with left facial droop. And hx of slurred speech. Neuro consulted. CT negative. Pt wants to go home. Resident will discuss risks benefits and encourage admission. Will sign AMA if she does not want to stay.       Yocasta Luciano MD  04/20/19 2981

## 2019-04-20 NOTE — ED NOTES
Pt has intermittent confusion. Pt is alert and oriented X4 but can't make full and complete sentences.  Pt will make random and confusion sentences here and there        Harry Duarte RN  04/20/19 [id number]

## 2019-04-20 NOTE — ED PROVIDER NOTES
Floyd [facility]  ED  Emergency Department Encounter  EmergencyMedicine Resident     Pt Name:Emi Whaley  MRN: [id number]  [place] 1955  Date of evaluation: 4/20/19  PCP:  Pete Carlisle       Chief Complaint   Patient presents with    Altered Mental Status       HISTORY OF PRESENT ILLNESS  (Location/Symptom, Timing/Onset, Context/Setting, Quality, Duration, Modifying Factors, Severity.)      Emi Daniels is a 75 y.o. female who presents with altered mental status, found unconscious by a neighbor prior to evaluation and presentation. .  Patient was brought in by EMS, awake alert oriented time of arrival.  Concern for mild left-sided facial droop, expressive aphasia no other focal neurologic deficit. Patient mildly hypotensive at time of arrival the patient denying any complaints, denies headaches, vision change, neck pain or stiffness, chest pain, shortness of breath, abdominal pain, nausea or vomiting. When specifically asked about her speech patient now admits to having speech difficulty which she believes started this morning. Patient's last known well was 12 AM when she went to bed. Patient states that she did take a Flexeril earlier in the day, states that she simply does not take these, patient changes her story and is unable to provide exact reason why she did this medication.     PAST MEDICAL / SURGICAL / SOCIAL / FAMILY HISTORY      has a past medical history of Appendicitis, CHF (congestive heart failure) (HCC), Chronic respiratory failure with hypoxia and hypercapnia (HCC), Chronic rhinitis, Cigarette smoker, COPD (chronic obstructive pulmonary disease) ([address] Depression, Dysphagia, Essential hypertension, GERD (gastroesophageal reflux disease), Heart failure, diastolic, with acute decompensation ([address] Hepatitis C, chronic ([address] History of smoking at least 1 pack per day for at least 30 years, Hyperlipidemia, Hypertension, Migraine, Moderate COPD (chronic obstructive pulmonary disease) ([facility] Multiple transfusions, Osteoarthritis, Pneumonia, Scoliosis, and Substance abuse ([facility] has a past surgical history that includes  section; LEEP (); Upper gastrointestinal endoscopy (); Gastric bypass surgery; Appendectomy (2017); laparoscopic appendectomy (N/A, 3/27/2017); Upper gastrointestinal endoscopy (2018); Upper gastrointestinal endoscopy (N/A, 2018); pr colonoscopy w/biopsy single/multiple (N/A, 11/10/2018); and pr egd transoral biopsy single/multiple (N/A, 2018).     Social History     Socioeconomic History    Marital status: Single     Spouse name: Not on file    Number of children: Not on file    Years of education: Not on file    Highest education level: Not on file   Occupational History    Not on file   Social Needs    Financial resource strain: Not on file    Food insecurity:     Worry: Not on file     Inability: Not on file    Transportation needs:     Medical: Not on file     Non-medical: Not on file   Tobacco Use    Smoking status: Former Smoker     Packs/day: 1.00     Years: 40.00     Pack years: 40.00     Types: Cigarettes     Start date:      Last attempt to quit: 2018     Years since quittin.4    Smokeless tobacco: Never Used   Substance and Sexual Activity    Alcohol use: No     Alcohol/week: 0.0 oz    Drug use: No     Types: IV, Cocaine     Comment: past history    Sexual activity: Not on file   Lifestyle    Physical activity:     Days per week: Not on file     Minutes per session: Not on file    Stress: Not on file   Relationships    Social connections:     Talks on phone: Not on file     Gets together: Not on file     Attends [employer] service: Not on file     Active member of club or organization: Not on file     Attends meetings of clubs or organizations: Not on file     Relationship status: Not on file    Intimate partner violence:     Fear of current or ex partner: Not on file Emotionally abused: Not on file     Physically abused: Not on file     Forced sexual activity: Not on file   Other Topics Concern    Not on file   Social History Narrative    Not on file       History reviewed. No pertinent family history. Allergies:  Aspirin; Keflet [cephalexin]; and Motrin [ibuprofen]    Home Medications:  Prior to Admission medications    Medication Sig Start Date End Date Taking?  Authorizing Provider   benzonatate (TESSALON PERLES) 100 MG capsule Take 1 capsule by mouth 3 times daily as needed for Cough 3/11/19   Kadie Lopez DO   carvedilol (COREG) 6.25 MG tablet Take 1 tablet by mouth 2 times daily (with meals) 2/7/19   Gregorio Hernandez MD   ferrous sulfate (MELISSA-HAO) 325 (65 Fe) MG tablet Take 1 tablet by mouth daily 2/7/19   Gregorio Hernandez MD   ondansetron (ZOFRAN ODT) 4 MG disintegrating tablet Take 1 tablet by mouth every 8 hours as needed for Nausea or Vomiting 1/11/19   Violeta Betancourt MD   melatonin 1 MG tablet Take 1 tablet by mouth nightly as needed for Sleep 1/11/19   Violeta Betancourt MD   ipratropium-albuterol (DUONEB) 0.5-2.5 (3) MG/3ML SOLN nebulizer solution Inhale 3 mLs into the lungs every 4 hours as needed for Shortness of Breath 1/11/19   Violeta Betancourt MD   benzocaine-menthol (CEPACOL SORE THROAT) 15-3.6 MG lozenge Take 1 lozenge by mouth every 2 hours as needed for Sore Throat 1/11/19   Violeta Betancourt MD   mometasone-formoterol [facility]) 100-5 MCG/ACT inhaler Inhale 2 puffs into the lungs 2 times daily 12/5/18   Wale Yarbrough MD   albuterol sulfate HFA (PROVENTIL HFA) 108 (90 Base) MCG/ACT inhaler Inhale 1-2 puffs into the lungs every 4 hours as needed for Wheezing or Shortness of Breath (Space out to every 6 hours as symptoms improve) 12/5/18   Wale Yarbrough MD   pantoprazole (PROTONIX) 40 MG tablet Take 1 tablet by mouth 2 times daily (before meals) 11/13/18   Bartlett Gowers, MD   folic acid (FOLVITE) 1 MG tablet Take 1 tablet by mouth daily 10/13/18   Radha Joyner Sherlyn Kilgore MD   vitamin B-1 100 MG tablet Take 1 tablet by mouth daily 10/13/18   Sonia Chiu MD   vitamin B-12 250 MCG tablet Take 1 tablet by mouth daily 10/13/18   Sonia Chiu MD   docusate sodium (COLACE) 100 MG capsule Take 1 capsule by mouth 2 times daily 10/12/18   Sonia Chiu MD   gabapentin (NEURONTIN) 300 MG capsule Take 300 mg by mouth 3 times daily. Tara Irwin Historical Provider, MD   buprenorphine-naloxone (SUBOXONE) 12-3 MG sublingual film Place 1 Film under the tongue daily. Tara Irwin Historical Provider, MD   guaiFENesin (MUCINEX) 600 MG extended release tablet Take 1 tablet by mouth 2 times daily 9/13/18   Laura Munguia MD   acetaminophen (APAP EXTRA STRENGTH) 500 MG tablet Take 2 tablets by mouth every 8 hours as needed for Pain 8/12/18   Candis Auguste MD   Multiple Vitamins-Minerals (THERAPEUTIC MULTIVITAMIN-MINERALS) tablet Take 1 tablet by mouth daily 8/12/18   Candis Auguste MD       REVIEW OF SYSTEMS    (2-9 systems for level 4, 10 or more for level 5)      Review of Systems   Constitutional: Negative for chills and fever. HENT: Negative. Eyes: Negative for visual disturbance. Respiratory: Negative for shortness of breath. Cardiovascular: Negative for chest pain. Gastrointestinal: Negative for abdominal pain, diarrhea and vomiting. Musculoskeletal: Negative for back pain and neck pain. Skin: Negative for rash. Neurological: Positive for facial asymmetry and speech difficulty. Psychiatric/Behavioral: Negative for confusion. PHYSICAL EXAM   (up to 7 for level 4, 8 or more for level 5)      INITIAL VITALS:   BP (!) 107/58   Pulse 88   Temp 97.9 °F (36.6 °C)   Resp 14   LMP 03/18/2015   SpO2 97%     Physical Exam   Constitutional: She is oriented to person, place, and time. No distress. HENT:   Head: Normocephalic and atraumatic. Oropharynx dry   Eyes: Pupils are equal, round, and reactive to light. EOM are normal.   Neck: Normal range of motion.  Neck supple. Cardiovascular: Normal rate and regular rhythm. Pulmonary/Chest: Effort normal. No respiratory distress. Abdominal: Soft. She exhibits no distension. There is no tenderness. Musculoskeletal: Normal range of motion. Neurological: She is alert and oriented to person, place, and time. Left sided facial droop and expressive aphasia   Skin: She is not diaphoretic.        DIFFERENTIAL  DIAGNOSIS     PLAN (LABS / IMAGING / EKG):  Orders Placed This Encounter   Procedures    CT HEAD WO CONTRAST    XR CHEST PORTABLE    CTA HEAD W CONTRAST    CTA NECK W CONTRAST    STROKE PANEL    Urinalysis Reflex to Culture    PREVIOUS SPECIMEN    Troponin    Inpatient consult to Neurology    Inpatient consult to Internal Medicine    EKG 12 Lead    PATIENT STATUS (FROM ED OR OR/PROCEDURAL) Inpatient       MEDICATIONS ORDERED:  Orders Placed This Encounter   Medications    0.9 % sodium chloride bolus    iohexol (OMNIPAQUE 350) solution 90 mL    potassium chloride 10 mEq/100 mL IVPB (Peripheral Line)       DDX: Stroke, UTI, electrolyte abnormality,    DIAGNOSTIC RESULTS / EMERGENCY DEPARTMENT COURSE / MDM     LABS:  Results for orders placed or performed during the hospital encounter of 04/20/19   STROKE PANEL   Result Value Ref Range    WBC 8.0 3.5 - 11.3 k/uL    RBC 4.59 3.95 - 5.11 m/uL    Hemoglobin 11.8 (L) 11.9 - 15.1 g/dL    Hematocrit 40.1 36.3 - 47.1 %    MCV 87.4 82.6 - 102.9 fL    MCH 25.7 25.2 - 33.5 pg    MCHC 29.4 28.4 - 34.8 g/dL    RDW 20.4 (H) 11.8 - 14.4 %    Platelets 113 (H) 657 - 453 k/uL    MPV 9.4 8.1 - 13.5 fL    NRBC Automated 0.0 0.0 per 100 WBC    Differential Type NOT REPORTED     WBC Morphology NOT REPORTED     RBC Morphology NOT REPORTED     Platelet Estimate NOT REPORTED     Immature Granulocytes 1 (H) 0 %    Seg Neutrophils 70 (H) 36 - 66 %    Lymphocytes 20 (L) 24 - 44 %    Atypical Lymphocytes 2 %    Monocytes 6 1 - 7 %    Eosinophils % 1 1 - 4 %    Basophils 0 0 - 2 % Absolute Immature Granulocyte 0.08 0.00 - 0.30 k/uL    Segs Absolute 5.60 1.8 - 7.7 k/uL    Absolute Lymph # 1.60 1.0 - 4.8 k/uL    Atypical Lymphocytes Absolute 0.16 k/uL    Absolute Mono # 0.48 0.1 - 0.8 k/uL    Absolute Eos # 0.08 0.0 - 0.4 k/uL    Basophils # 0.00 0.0 - 0.2 k/uL    Morphology ANISOCYTOSIS PRESENT     Protime 11.1 9.0 - 12.0 sec    INR 1.1     PTT 22.7 20.5 - 30.5 sec    Myoglobin 86 (H) 25 - 58 ng/mL    Troponin, High Sensitivity 23 (H) 0 - 14 ng/L    Troponin T NOT REPORTED <0.03 ng/mL    Troponin Interp NOT REPORTED     Glucose 88 70 - 99 mg/dL    BUN 20 8 - 23 mg/dL    CREATININE 2.27 (H) 0.50 - 0.90 mg/dL    Bun/Cre Ratio NOT REPORTED 9 - 20    Calcium 9.4 8.6 - 10.4 mg/dL    Sodium 135 135 - 144 mmol/L    Potassium 3.2 (L) 3.7 - 5.3 mmol/L    Chloride 100 98 - 107 mmol/L    CO2 20 20 - 31 mmol/L    Anion Gap 15 9 - 17 mmol/L    GFR Non-African American 22 (L) >60 mL/min    GFR  26 (L) >60 mL/min    GFR Comment          GFR Staging NOT REPORTED     Total CK 76 26 - 192 U/L    CK-MB 1.1 <5.4 ng/mL    % CKMB 1.4 0.0 - 3.0 %    CKMB Interpretation NORMAL ISOENZYME PATTERN          RADIOLOGY:  Ct Head Wo Contrast    Result Date: 4/20/2019  EXAMINATION: CT OF THE HEAD WITHOUT CONTRAST  4/20/2019 2:07 pm TECHNIQUE: CT of the head was performed without the administration of intravenous contrast. Dose modulation, iterative reconstruction, and/or weight based adjustment of the mA/kV was utilized to reduce the radiation dose to as low as reasonably achievable. COMPARISON: November 7, 2018 HISTORY: ORDERING SYSTEM PROVIDED HISTORY: AMS, facial droop, expressive aphasia TECHNOLOGIST PROVIDED HISTORY: FINDINGS: BRAIN/VENTRICLES: There is no acute intracranial hemorrhage, mass effect or midline shift. No abnormal extra-axial fluid collection. The gray-white differentiation is maintained without evidence of an acute infarct.  There is prominence of the ventricles and sulci due to global was performed with the administration of intravenous contrast. Multiplanar reformatted images are provided for review. MIP images are provided for review. Stenosis of the internal carotid arteries measured using NASCET criteria. Dose modulation, iterative reconstruction, and/or weight based adjustment of the mA/kV was utilized to reduce the radiation dose to as low as reasonably achievable. COMPARISON: None. HISTORY: ORDERING SYSTEM PROVIDED HISTORY: expressive aphasia TECHNOLOGIST PROVIDED HISTORY: Ordering Physician Provided Reason for Exam: r/o stroke Acuity: Acute; ORDERING SYSTEM PROVIDED HISTORY: expressive aphasia TECHNOLOGIST PROVIDED HISTORY: FINDINGS: CTA NECK: AORTIC ARCH/ARCH VESSELS: No significant stenosis is seen of the innominate artery or subclavian arteries. CAROTID ARTERIES: The common carotid arteries are normal in appearance without evidence of a flow limiting stenosis. The internal carotid arteries are normal in appearance without evidence of a flow limiting stenosis by NASCET criteria. No dissection or arterial injury is seen. VERTEBRAL ARTERIES: The vertebral arteries both arise from the subclavian arteries and are normal in caliber without evidence of flow limiting stenosis or dissection. SOFT TISSUES: Emphysematous changes at the lung apices. No evidence of superior mediastinal lymphadenopathy. Thyroid gland is heterogeneously enlarged with multiple thyroid nodules. The nasopharynx, oral cavity, oropharynx, hypopharynx, and laryngeal structures are grossly unremarkable. The parotid glands and submandibular glands are unremarkable. Multiple left supraclavicular lymph nodes are not enlarged by individual size criteria but are conspicuous in numbers. There are left level V lymph nodes which are also conspicuous in numbers. No evidence of right cervical lymphadenopathy. No evidence of retropharyngeal fluid or abscess. BONES: Multilevel degenerative changes of the cervical spine.   Grade 1 anterolisthesis of C2 in relation to C3. Heterogeneous appearance of the osseous marrow. Multiple small lucent lesions throughout the cervical spine. CTA HEAD: ANTERIOR CIRCULATION: Mild atherosclerotic disease involving the intracranial internal carotid arteries. No flow-limiting stenosis. The anterior cerebral and middle cerebral arteries demonstrate no focal stenosis. POSTERIOR CIRCULATION: The posterior cerebral arteries demonstrate no focal stenosis. The vertebral and basilar arteries appear unremarkable. BRAIN: No mass effect or midline shift. No abnormal extra-axial fluid collection. The gray-white differentiation appears grossly maintained. No high-grade stenosis or focal occlusion involving the intracranial or cervical vasculature. No evidence of acute dissection. No evidence of aneurysm. Heterogeneous appearance of the osseous structures with multiple tiny lucent lesions. While this could be secondary to severe osteopenia, possibility of osseous metastasis or multiple myeloma should be excluded. Recommend nonemergent nuclear medicine bone scan. Multiple nonenlarged but conspicuous left supraclavicular and left level V lymph nodes. These are asymmetric compared to the right. While this may be reactive or inflammatory, consider short interval follow-up to document resolution. Heterogeneously enlarged thyroid gland with multiple thyroid nodules. Recommend nonemergent thyroid ultrasound. Emphysema. Cta Head W Contrast    Result Date: 4/20/2019  EXAMINATION: CTA OF THE HEAD WITH CONTRAST; CTA OF THE NECK 4/20/2019 2:44 pm: TECHNIQUE: CTA of the head/brain was performed with the administration of intravenous contrast. Multiplanar reformatted images are provided for review. MIP images are provided for review.  Dose modulation, iterative reconstruction, and/or weight based adjustment of the mA/kV was utilized to reduce the radiation dose to as low as reasonably achievable.; CTA of the neck was performed with the administration of intravenous contrast. Multiplanar reformatted images are provided for review. MIP images are provided for review. Stenosis of the internal carotid arteries measured using NASCET criteria. Dose modulation, iterative reconstruction, and/or weight based adjustment of the mA/kV was utilized to reduce the radiation dose to as low as reasonably achievable. COMPARISON: None. HISTORY: ORDERING SYSTEM PROVIDED HISTORY: expressive aphasia TECHNOLOGIST PROVIDED HISTORY: Ordering Physician Provided Reason for Exam: r/o stroke Acuity: Acute; ORDERING SYSTEM PROVIDED HISTORY: expressive aphasia TECHNOLOGIST PROVIDED HISTORY: FINDINGS: CTA NECK: AORTIC ARCH/ARCH VESSELS: No significant stenosis is seen of the innominate artery or subclavian arteries. CAROTID ARTERIES: The common carotid arteries are normal in appearance without evidence of a flow limiting stenosis. The internal carotid arteries are normal in appearance without evidence of a flow limiting stenosis by NASCET criteria. No dissection or arterial injury is seen. VERTEBRAL ARTERIES: The vertebral arteries both arise from the subclavian arteries and are normal in caliber without evidence of flow limiting stenosis or dissection. SOFT TISSUES: Emphysematous changes at the lung apices. No evidence of superior mediastinal lymphadenopathy. Thyroid gland is heterogeneously enlarged with multiple thyroid nodules. The nasopharynx, oral cavity, oropharynx, hypopharynx, and laryngeal structures are grossly unremarkable. The parotid glands and submandibular glands are unremarkable. Multiple left supraclavicular lymph nodes are not enlarged by individual size criteria but are conspicuous in numbers. There are left level V lymph nodes which are also conspicuous in numbers. No evidence of right cervical lymphadenopathy. No evidence of retropharyngeal fluid or abscess. BONES: Multilevel degenerative changes of the cervical spine.   Grade 1 possibly unconscious. Patient arrives awake alert oriented, patient poor historian and unable to provide details surrounding event. Patient did mention taking Flexeril however patient was found to have left-sided facial droop, expressive aphasia. Stroke priority called, stroke workup, CT head ordered. Stroking him evaluated patient and recommends CTA head and neck. Initial CT head negative for acute cranial pathology. Stroke team recommended medicine admission with neurology consult and further stroke workup.    4:41 PM patient was reevaluated, discussed patient plan for admission as she has acute kidney injury, likely secondary to dehydration. Mildly elevated troponin at 23, will repeat, no acute change on EKG. CTA head and neck no acute severe stenosis of the neck and head. Discussed case with internal medicine resident plan for admission. PROCEDURES:  None    CONSULTS:  IP CONSULT TO NEUROLOGY  IP CONSULT TO INTERNAL MEDICINE    CRITICAL CARE:  None    FINAL IMPRESSION      1. Acute kidney injury ([address]    2. Cerebrovascular accident (CVA), unspecified mechanism ([address]    3. Hypokalemia    4.  Hypotension due to hypovolemia          DISPOSITION / PLAN     DISPOSITION Admitted 04/20/2019 04:35:09 PM      PATIENT REFERRED TO:  Phuong Perez Luis Alberto [address]  [phone]            DISCHARGE MEDICATIONS:  New Prescriptions    No medications on file       Jennifer Carbajal DO  Emergency Medicine Resident    (Please note that portions of thisnote were completed with a voice recognition program.  Efforts were made to edit the dictations but occasionally words are mis-transcribed.)        Jennifer Carbajal DO  04/20/19 [id number]

## 2019-04-21 ENCOUNTER — APPOINTMENT (OUTPATIENT)
Dept: GENERAL RADIOLOGY | Age: 64
DRG: 469 | End: 2019-04-21
Payer: MEDICARE

## 2019-04-21 ENCOUNTER — APPOINTMENT (OUTPATIENT)
Dept: CT IMAGING | Age: 64
DRG: 469 | End: 2019-04-21
Payer: MEDICARE

## 2019-04-21 LAB
-: ABNORMAL
ABSOLUTE EOS #: 0.07 K/UL (ref 0–0.44)
ABSOLUTE IMMATURE GRANULOCYTE: 0.07 K/UL (ref 0–0.3)
ABSOLUTE LYMPH #: 1.66 K/UL (ref 1.1–3.7)
ABSOLUTE MONO #: 0.86 K/UL (ref 0.1–1.2)
AMMONIA: 54 UMOL/L (ref 11–51)
AMORPHOUS: ABNORMAL
AMPHETAMINE SCREEN URINE: NEGATIVE
ANION GAP SERPL CALCULATED.3IONS-SCNC: 14 MMOL/L (ref 9–17)
BACTERIA: ABNORMAL
BARBITURATE SCREEN URINE: NEGATIVE
BASOPHILS # BLD: 0 % (ref 0–2)
BASOPHILS ABSOLUTE: 0 K/UL (ref 0–0.2)
BENZODIAZEPINE SCREEN, URINE: NEGATIVE
BILIRUBIN URINE: NEGATIVE
BUN BLDV-MCNC: 15 MG/DL (ref 8–23)
BUN/CREAT BLD: ABNORMAL (ref 9–20)
BUPRENORPHINE URINE: NORMAL
CALCIUM SERPL-MCNC: 8.7 MG/DL (ref 8.6–10.4)
CANNABINOID SCREEN URINE: NEGATIVE
CASTS UA: ABNORMAL /LPF (ref 0–8)
CHLORIDE BLD-SCNC: 107 MMOL/L (ref 98–107)
CHOLESTEROL/HDL RATIO: 3.6
CHOLESTEROL: 119 MG/DL
CO2: 19 MMOL/L (ref 20–31)
COCAINE METABOLITE, URINE: NEGATIVE
COLOR: YELLOW
CREAT SERPL-MCNC: 1.01 MG/DL (ref 0.5–0.9)
CREATININE URINE: 114.5 MG/DL (ref 28–217)
CREATININE URINE: 115.9 MG/DL (ref 28–217)
CRYSTALS, UA: ABNORMAL /HPF
DIFFERENTIAL TYPE: ABNORMAL
EOSINOPHIL,URINE: NORMAL
EOSINOPHILS RELATIVE PERCENT: 1 % (ref 1–4)
EPITHELIAL CELLS UA: ABNORMAL /HPF (ref 0–5)
ESTIMATED AVERAGE GLUCOSE: 91 MG/DL
FOLATE: 14.3 NG/ML
GFR AFRICAN AMERICAN: >60 ML/MIN
GFR NON-AFRICAN AMERICAN: 55 ML/MIN
GFR SERPL CREATININE-BSD FRML MDRD: ABNORMAL ML/MIN/{1.73_M2}
GFR SERPL CREATININE-BSD FRML MDRD: ABNORMAL ML/MIN/{1.73_M2}
GLUCOSE BLD-MCNC: 128 MG/DL (ref 70–99)
GLUCOSE URINE: NEGATIVE
HBA1C MFR BLD: 4.8 % (ref 4–6)
HCT VFR BLD CALC: 35 % (ref 36.3–47.1)
HDLC SERPL-MCNC: 33 MG/DL
HEMOGLOBIN: 10.3 G/DL (ref 11.9–15.1)
IMMATURE GRANULOCYTES: 1 %
KETONES, URINE: NEGATIVE
LACTIC ACID, WHOLE BLOOD: 1.7 MMOL/L (ref 0.7–2.1)
LACTIC ACID, WHOLE BLOOD: 2.1 MMOL/L (ref 0.7–2.1)
LACTIC ACID, WHOLE BLOOD: 2.5 MMOL/L (ref 0.7–2.1)
LDL CHOLESTEROL: 61 MG/DL (ref 0–130)
LEUKOCYTE ESTERASE, URINE: ABNORMAL
LYMPHOCYTES # BLD: 23 % (ref 24–43)
MAGNESIUM: 1.7 MG/DL (ref 1.6–2.6)
MCH RBC QN AUTO: 25.8 PG (ref 25.2–33.5)
MCHC RBC AUTO-ENTMCNC: 29.4 G/DL (ref 28.4–34.8)
MCV RBC AUTO: 87.7 FL (ref 82.6–102.9)
MDMA URINE: NORMAL
METHADONE SCREEN, URINE: NEGATIVE
METHAMPHETAMINE, URINE: NORMAL
MONOCYTES # BLD: 12 % (ref 3–12)
MORPHOLOGY: ABNORMAL
MUCUS: ABNORMAL
NITRITE, URINE: NEGATIVE
NRBC AUTOMATED: 0 PER 100 WBC
OPIATES, URINE: NEGATIVE
OTHER OBSERVATIONS UA: ABNORMAL
OXYCODONE SCREEN URINE: NEGATIVE
PDW BLD-RTO: 20.5 % (ref 11.8–14.4)
PH UA: 5.5 (ref 5–8)
PHENCYCLIDINE, URINE: NEGATIVE
PLATELET # BLD: 456 K/UL (ref 138–453)
PLATELET ESTIMATE: ABNORMAL
PMV BLD AUTO: 10.4 FL (ref 8.1–13.5)
POTASSIUM SERPL-SCNC: 3 MMOL/L (ref 3.7–5.3)
PROPOXYPHENE, URINE: NORMAL
PROTEIN UA: ABNORMAL
RBC # BLD: 3.99 M/UL (ref 3.95–5.11)
RBC # BLD: ABNORMAL 10*6/UL
RBC UA: ABNORMAL /HPF (ref 0–4)
RENAL EPITHELIAL, UA: ABNORMAL /HPF
SEG NEUTROPHILS: 63 % (ref 36–65)
SEGMENTED NEUTROPHILS ABSOLUTE COUNT: 4.54 K/UL (ref 1.5–8.1)
SODIUM BLD-SCNC: 140 MMOL/L (ref 135–144)
SODIUM,UR: 23 MMOL/L
SPECIFIC GRAVITY UA: 1.04 (ref 1–1.03)
TEST INFORMATION: NORMAL
THYROXINE, FREE: 1.26 NG/DL (ref 0.93–1.7)
TOTAL PROTEIN, URINE: 30 MG/DL
TRICHOMONAS: ABNORMAL
TRICYCLIC ANTIDEPRESSANTS, UR: NORMAL
TRIGL SERPL-MCNC: 125 MG/DL
TROPONIN INTERP: NORMAL
TROPONIN T: NORMAL NG/ML
TROPONIN, HIGH SENSITIVITY: 13 NG/L (ref 0–14)
TSH SERPL DL<=0.05 MIU/L-ACNC: 0.22 MIU/L (ref 0.3–5)
TURBIDITY: CLEAR
URINE HGB: NEGATIVE
URINE TOTAL PROTEIN CREATININE RATIO: 0.26 (ref 0–0.2)
UROBILINOGEN, URINE: NORMAL
VITAMIN B-12: 633 PG/ML (ref 232–1245)
VLDLC SERPL CALC-MCNC: ABNORMAL MG/DL (ref 1–30)
WBC # BLD: 7.2 K/UL (ref 3.5–11.3)
WBC # BLD: ABNORMAL 10*3/UL
WBC UA: ABNORMAL /HPF (ref 0–5)
YEAST: ABNORMAL

## 2019-04-21 PROCEDURE — 94640 AIRWAY INHALATION TREATMENT: CPT

## 2019-04-21 PROCEDURE — 83605 ASSAY OF LACTIC ACID: CPT

## 2019-04-21 PROCEDURE — 73502 X-RAY EXAM HIP UNI 2-3 VIEWS: CPT

## 2019-04-21 PROCEDURE — 84166 PROTEIN E-PHORESIS/URINE/CSF: CPT

## 2019-04-21 PROCEDURE — 36415 COLL VENOUS BLD VENIPUNCTURE: CPT

## 2019-04-21 PROCEDURE — 6360000002 HC RX W HCPCS: Performed by: STUDENT IN AN ORGANIZED HEALTH CARE EDUCATION/TRAINING PROGRAM

## 2019-04-21 PROCEDURE — 85025 COMPLETE CBC W/AUTO DIFF WBC: CPT

## 2019-04-21 PROCEDURE — 2580000003 HC RX 258: Performed by: STUDENT IN AN ORGANIZED HEALTH CARE EDUCATION/TRAINING PROGRAM

## 2019-04-21 PROCEDURE — 99223 1ST HOSP IP/OBS HIGH 75: CPT | Performed by: STUDENT IN AN ORGANIZED HEALTH CARE EDUCATION/TRAINING PROGRAM

## 2019-04-21 PROCEDURE — 71250 CT THORAX DX C-: CPT

## 2019-04-21 PROCEDURE — 84300 ASSAY OF URINE SODIUM: CPT

## 2019-04-21 PROCEDURE — 94664 DEMO&/EVAL PT USE INHALER: CPT

## 2019-04-21 PROCEDURE — 6370000000 HC RX 637 (ALT 250 FOR IP): Performed by: STUDENT IN AN ORGANIZED HEALTH CARE EDUCATION/TRAINING PROGRAM

## 2019-04-21 PROCEDURE — 84439 ASSAY OF FREE THYROXINE: CPT

## 2019-04-21 PROCEDURE — 82570 ASSAY OF URINE CREATININE: CPT

## 2019-04-21 PROCEDURE — 80048 BASIC METABOLIC PNL TOTAL CA: CPT

## 2019-04-21 PROCEDURE — 84156 ASSAY OF PROTEIN URINE: CPT

## 2019-04-21 PROCEDURE — 83735 ASSAY OF MAGNESIUM: CPT

## 2019-04-21 PROCEDURE — 87205 SMEAR GRAM STAIN: CPT

## 2019-04-21 PROCEDURE — 80307 DRUG TEST PRSMV CHEM ANLYZR: CPT

## 2019-04-21 PROCEDURE — 80061 LIPID PANEL: CPT

## 2019-04-21 PROCEDURE — 99223 1ST HOSP IP/OBS HIGH 75: CPT | Performed by: INTERNAL MEDICINE

## 2019-04-21 PROCEDURE — 81001 URINALYSIS AUTO W/SCOPE: CPT

## 2019-04-21 PROCEDURE — 1200000000 HC SEMI PRIVATE

## 2019-04-21 RX ORDER — POTASSIUM CHLORIDE 20 MEQ/1
40 TABLET, EXTENDED RELEASE ORAL ONCE
Status: COMPLETED | OUTPATIENT
Start: 2019-04-21 | End: 2019-04-21

## 2019-04-21 RX ORDER — MAGNESIUM SULFATE 1 G/100ML
1 INJECTION INTRAVENOUS ONCE
Status: COMPLETED | OUTPATIENT
Start: 2019-04-21 | End: 2019-04-21

## 2019-04-21 RX ADMIN — ACETAMINOPHEN 650 MG: 325 TABLET ORAL at 20:00

## 2019-04-21 RX ADMIN — MOMETASONE FUROATE AND FORMOTEROL FUMARATE DIHYDRATE 2 PUFF: 100; 5 AEROSOL RESPIRATORY (INHALATION) at 19:44

## 2019-04-21 RX ADMIN — PANTOPRAZOLE SODIUM 40 MG: 40 TABLET, DELAYED RELEASE ORAL at 08:06

## 2019-04-21 RX ADMIN — CLOPIDOGREL 75 MG: 75 TABLET, FILM COATED ORAL at 08:06

## 2019-04-21 RX ADMIN — BUPRENORPHINE AND NALOXONE 1 TABLET: 2; .5 TABLET SUBLINGUAL at 08:06

## 2019-04-21 RX ADMIN — MAGNESIUM SULFATE HEPTAHYDRATE 1 G: 1 INJECTION, SOLUTION INTRAVENOUS at 08:07

## 2019-04-21 RX ADMIN — HEPARIN SODIUM 5000 UNITS: 5000 INJECTION INTRAVENOUS; SUBCUTANEOUS at 08:07

## 2019-04-21 RX ADMIN — POTASSIUM CHLORIDE 40 MEQ: 20 TABLET, EXTENDED RELEASE ORAL at 08:06

## 2019-04-21 RX ADMIN — HEPARIN SODIUM 5000 UNITS: 5000 INJECTION INTRAVENOUS; SUBCUTANEOUS at 14:33

## 2019-04-21 RX ADMIN — HEPARIN SODIUM 5000 UNITS: 5000 INJECTION INTRAVENOUS; SUBCUTANEOUS at 21:37

## 2019-04-21 RX ADMIN — FOLIC ACID 1 MG: 1 TABLET ORAL at 08:06

## 2019-04-21 RX ADMIN — Medication 10 ML: at 21:34

## 2019-04-21 RX ADMIN — SODIUM CHLORIDE: 9 INJECTION, SOLUTION INTRAVENOUS at 02:44

## 2019-04-21 RX ADMIN — DESMOPRESSIN ACETATE 40 MG: 0.2 TABLET ORAL at 21:33

## 2019-04-21 RX ADMIN — MOMETASONE FUROATE AND FORMOTEROL FUMARATE DIHYDRATE 2 PUFF: 100; 5 AEROSOL RESPIRATORY (INHALATION) at 12:02

## 2019-04-21 RX ADMIN — SODIUM CHLORIDE: 9 INJECTION, SOLUTION INTRAVENOUS at 20:00

## 2019-04-21 ASSESSMENT — ENCOUNTER SYMPTOMS
NAUSEA: 1
VOMITING: 0
DIARRHEA: 1
EYES NEGATIVE: 1
RESPIRATORY NEGATIVE: 1
BACK PAIN: 0

## 2019-04-21 ASSESSMENT — PAIN SCALES - GENERAL
PAINLEVEL_OUTOF10: 10
PAINLEVEL_OUTOF10: 9
PAINLEVEL_OUTOF10: 8

## 2019-04-21 NOTE — CONSULTS
Neurology Consult Note          CHIEF COMPLAINT:  Altered mental status    History Obtained From:  patient, electronic medical record       HISTORY OF PRESENT ILLNESS:              The patient is a 75 y.o. female with significant past medical history of CHF, hep C, hypertension, hyperlipidemia,  who presents with  altered mental status. Initial NIH of 3 account of mild expressive aphasia, mild left facial droop. She had left lower extremity weakness which she reported to be chronic. The ED, blood pressure 78/52, pulse 93, RR 16, SPO2 98% on RA, T 97.9. Over the last 24 hours, she has been admitted to the hospital and mental status has been improving. CRYSTAL is resolving. Lactic acid is elevated at 2.5 and turned down to 2. Lipid panel is within acceptable range.     Past Medical History:        Diagnosis Date    Appendicitis 3/27/2017    CHF (congestive heart failure) (HCC)     Chronic respiratory failure with hypoxia and hypercapnia (HCC)     Chronic rhinitis     Cigarette smoker     COPD (chronic obstructive pulmonary disease) ([address] 2018    Depression     Dysphagia     Essential hypertension 2018    GERD (gastroesophageal reflux disease)     Heart failure, diastolic, with acute decompensation ([address] 2018    Hepatitis C, chronic (HCC)     History of smoking at least 1 pack per day for at least 30 years     Hyperlipidemia     Hypertension     Migraine     Moderate COPD (chronic obstructive pulmonary disease) (HCC)     Multiple transfusions     Osteoarthritis     hands    Pneumonia     Scoliosis     Substance abuse ([address]     ivda and cocaine abuse in past     Past Surgical History:        Procedure Laterality Date    APPENDECTOMY  2017     SECTION      GASTRIC BYPASS SURGERY      LAPAROSCOPIC APPENDECTOMY N/A 3/27/2017    APPENDECTOMY LAPAROSCOPIC performed by Toby Smith MD at [address] [place] [address]      hgsil    MT COLONOSCOPY W/BIOPSY SINGLE/MULTIPLE N/A 11/10/2018    COLONOSCOPY WITH BIOPSY performed by Vanessa Roberts MD at [address] [facility] EGD TRANSORAL BIOPSY SINGLE/MULTIPLE N/A 11/9/2018    EGD ESOPHAGOGASTRODUODENOSCOPY performed by Srinivasan Barry MD at [facility]  2008    oesophagitis, candidiasis    UPPER GASTROINTESTINAL ENDOSCOPY  11/05/2018    UPPER GASTROINTESTINAL ENDOSCOPY N/A 11/5/2018    EGD CONTROL HEMORRHAGE performed by Srinivasan Barry MD at [place] [address]     Current Medications:   Current Facility-Administered Medications: sodium chloride flush 0.9 % injection 10 mL, 10 mL, Intravenous, 2 times per day  sodium chloride flush 0.9 % injection 10 mL, 10 mL, Intravenous, PRN  albuterol sulfate  (90 Base) MCG/ACT inhaler 2 puff, 2 puff, Inhalation, Q4H PRN  benzonatate (TESSALON) capsule 100 mg, 100 mg, Oral, TID PRN  folic acid (FOLVITE) tablet 1 mg, 1 mg, Oral, Daily  ipratropium-albuterol (DUONEB) nebulizer solution 3 mL, 1 vial, Inhalation, Q4H PRN  mometasone-formoterol (DULERA) 100-5 MCG/ACT inhaler 2 puff, 2 puff, Inhalation, BID  pantoprazole (PROTONIX) tablet 40 mg, 40 mg, Oral, Daily  heparin (porcine) injection 5,000 Units, 5,000 Units, Subcutaneous, 3 times per day  sodium chloride flush 0.9 % injection 10 mL, 10 mL, Intravenous, 2 times per day  sodium chloride flush 0.9 % injection 10 mL, 10 mL, Intravenous, PRN  magnesium sulfate 1 g in dextrose 5% 100 mL IVPB, 1 g, Intravenous, PRN  magnesium hydroxide (MILK OF MAGNESIA) 400 MG/5ML suspension 30 mL, 30 mL, Oral, Daily PRN  ondansetron (ZOFRAN) injection 4 mg, 4 mg, Intravenous, Q6H PRN  acetaminophen (TYLENOL) tablet 650 mg, 650 mg, Oral, Q4H PRN  0.9 % sodium chloride infusion, , Intravenous, Continuous  clopidogrel (PLAVIX) tablet 75 mg, 75 mg, Oral, Daily  atorvastatin (LIPITOR) tablet 40 mg, 40 mg, Oral, Nightly  albuterol (PROVENTIL) nebulizer solution 2.5 mg, 2.5 mg, Nebulization, Q6H PRN  buprenorphine-naloxone (SUBOXONE) 2-0.5 MG SL tablet 1 tablet, 1 tablet, Sublingual, Daily  Allergies:  Aspirin; Keflet [cephalexin]; and Motrin [ibuprofen]    Social History:  TOBACCO:   reports that she quit smoking about 5 months ago. Her smoking use included cigarettes. She started smoking about 49 years ago. She has a 40.00 pack-year smoking history. She has never used smokeless tobacco.  ETOH:   reports that she does not drink alcohol. DRUGS:   reports that she does not use drugs. ACTIVITIES OF DAILY LIVING:    INSTRUMENTAL ACTIVITIES OF DAILY LIVING:  Patient is able to perform all instrumental activities of daily living. Family History:   History reviewed. No pertinent family history. REVIEW OF SYSTEMS:  Review of Systems   Constitutional: Negative. HENT: Negative. Eyes: Negative. Respiratory: Negative. Cardiovascular: Negative. Gastrointestinal: Positive for diarrhea and nausea. Negative for vomiting. Endocrine: Negative. Genitourinary: Negative. Musculoskeletal: Positive for arthralgias. Negative for back pain, gait problem, joint swelling, myalgias, neck pain and neck stiffness. Neurological: Negative for dizziness, tremors, seizures, syncope, facial asymmetry, speech difficulty, weakness, light-headedness, numbness and headaches. PHYSICAL EXAM:  Vitals:  /62   Pulse 54   Temp 98.2 °F (36.8 °C)   Resp 15   Ht 5' (1.524 m)   Wt 111 lb 5.3 oz (50.5 kg)   LMP 03/18/2015   SpO2 92%   BMI 21.74 kg/m²    Physical Exam   Constitutional: She is oriented to person, place, and time. She appears well-developed and well-nourished. No distress. HENT:   Head: Normocephalic and atraumatic. Eyes: Pupils are equal, round, and reactive to light. Conjunctivae and EOM are normal.   Neck: Normal range of motion. Neck supple. Cardiovascular: Normal rate, regular rhythm, normal heart sounds and intact distal pulses. Exam reveals no gallop and no friction rub.    No murmur heard.  Pulmonary/Chest: Effort normal and breath sounds normal. No stridor. No respiratory distress. She has no wheezes. She has no rales. She exhibits no tenderness. Abdominal: Soft. Bowel sounds are normal.   Musculoskeletal: Normal range of motion. She exhibits tenderness (Over right inguinal area). She exhibits no edema or deformity. Neurological: She is alert and oriented to person, place, and time. She has normal strength. She displays no atrophy, no tremor and normal reflexes. No cranial nerve deficit or sensory deficit. She exhibits normal muscle tone. She displays no seizure activity. Coordination normal. GCS eye subscore is 4. GCS verbal subscore is 5. GCS motor subscore is 6. Reflex Scores:       Tricep reflexes are 2+ on the right side and 2+ on the left side. Bicep reflexes are 2+ on the right side and 2+ on the left side. Brachioradialis reflexes are 2+ on the right side and 2+ on the left side. Patellar reflexes are 2+ on the right side and 2+ on the left side. Achilles reflexes are 2+ on the right side and 2+ on the left side. Skin: Skin is warm and dry. No rash noted. She is not diaphoretic. No erythema. No pallor.         DATA  Recent Results (from the past 24 hour(s))   STROKE PANEL    Collection Time: 04/20/19  3:26 PM   Result Value Ref Range    WBC 8.0 3.5 - 11.3 k/uL    RBC 4.59 3.95 - 5.11 m/uL    Hemoglobin 11.8 (L) 11.9 - 15.1 g/dL    Hematocrit 40.1 36.3 - 47.1 %    MCV 87.4 82.6 - 102.9 fL    MCH 25.7 25.2 - 33.5 pg    MCHC 29.4 28.4 - 34.8 g/dL    RDW 20.4 (H) 11.8 - 14.4 %    Platelets 533 (H) 380 - 453 k/uL    MPV 9.4 8.1 - 13.5 fL    NRBC Automated 0.0 0.0 per 100 WBC    Differential Type NOT REPORTED     WBC Morphology NOT REPORTED     RBC Morphology NOT REPORTED     Platelet Estimate NOT REPORTED     Immature Granulocytes 1 (H) 0 %    Seg Neutrophils 70 (H) 36 - 66 %    Lymphocytes 20 (L) 24 - 44 %    Atypical Lymphocytes 2 %    Monocytes 6 1 - 7 % Eosinophils % 1 1 - 4 %    Basophils 0 0 - 2 %    Absolute Immature Granulocyte 0.08 0.00 - 0.30 k/uL    Segs Absolute 5.60 1.8 - 7.7 k/uL    Absolute Lymph # 1.60 1.0 - 4.8 k/uL    Atypical Lymphocytes Absolute 0.16 k/uL    Absolute Mono # 0.48 0.1 - 0.8 k/uL    Absolute Eos # 0.08 0.0 - 0.4 k/uL    Basophils # 0.00 0.0 - 0.2 k/uL    Morphology ANISOCYTOSIS PRESENT     Protime 11.1 9.0 - 12.0 sec    INR 1.1     PTT 22.7 20.5 - 30.5 sec    Myoglobin 86 (H) 25 - 58 ng/mL    Troponin, High Sensitivity 23 (H) 0 - 14 ng/L    Troponin T NOT REPORTED <0.03 ng/mL    Troponin Interp NOT REPORTED     Glucose 88 70 - 99 mg/dL    BUN 20 8 - 23 mg/dL    CREATININE 2.27 (H) 0.50 - 0.90 mg/dL    Bun/Cre Ratio NOT REPORTED 9 - 20    Calcium 9.4 8.6 - 10.4 mg/dL    Sodium 135 135 - 144 mmol/L    Potassium 3.2 (L) 3.7 - 5.3 mmol/L    Chloride 100 98 - 107 mmol/L    CO2 20 20 - 31 mmol/L    Anion Gap 15 9 - 17 mmol/L    GFR Non-African American 22 (L) >60 mL/min    GFR  26 (L) >60 mL/min    GFR Comment          GFR Staging NOT REPORTED     Total CK 76 26 - 192 U/L    CK-MB 1.1 <5.4 ng/mL    % CKMB 1.4 0.0 - 3.0 %    CKMB Interpretation NORMAL ISOENZYME PATTERN    Troponin    Collection Time: 04/20/19  4:28 PM   Result Value Ref Range    Troponin, High Sensitivity 21 (H) 0 - 14 ng/L    Troponin T NOT REPORTED <0.03 ng/mL    Troponin Interp NOT REPORTED    EKG 12 Lead    Collection Time: 04/20/19  6:41 PM   Result Value Ref Range    Ventricular Rate 91 BPM    Atrial Rate 91 BPM    P-R Interval 168 ms    QRS Duration 70 ms    Q-T Interval 344 ms    QTc Calculation (Bazett) 423 ms    P Axis 75 degrees    R Axis 54 degrees    T Axis 64 degrees   Ammonia    Collection Time: 04/21/19  1:41 AM   Result Value Ref Range    Ammonia 54 (H) 11 - 51 umol/L   Vitamin B12 & Folate    Collection Time: 04/21/19  1:41 AM   Result Value Ref Range    Vitamin B-12 633 232 - 1245 pg/mL    Folate 14.3 >4.8 ng/mL   Hemoglobin A1C Collection Time: 04/21/19  1:41 AM   Result Value Ref Range    Hemoglobin A1C 4.8 4.0 - 6.0 %    Estimated Avg Glucose 91 mg/dL   Electrophoresis Protein, Serum without Reflex to Immunofixation    Collection Time: 04/21/19  1:41 AM   Result Value Ref Range    Total Protein 6.2 (L) 6.4 - 8.3 g/dL    Albumin (calculated) PENDING g/dL    Albumin % PENDING %    Alpha-1-Globulin PENDING g/dL    Alpha 1 % PENDING %    Alpha-2-Globulin PENDING g/dL    Alpha 2 % PENDING %    Beta Globulin PENDING g/dL    Beta Percent PENDING %    Gamma Globulin PENDING g/dL    Gamma Globulin % PENDING %    Total Prot. Sum PENDING g/dL    Total Prot.  Sum,% PENDING %    Protein Electrophoresis, Serum PENDING     Pathologist PENDING    TSH with Reflex    Collection Time: 04/21/19  1:41 AM   Result Value Ref Range    TSH 0.22 (L) 0.30 - 5.00 mIU/L   Troponin    Collection Time: 04/21/19  1:41 AM   Result Value Ref Range    Troponin, High Sensitivity 13 0 - 14 ng/L    Troponin T NOT REPORTED <0.03 ng/mL    Troponin Interp NOT REPORTED    Lactic Acid, Whole Blood    Collection Time: 04/21/19  1:41 AM   Result Value Ref Range    Lactic Acid, Whole Blood 2.5 (H) 0.7 - 2.1 mmol/L   T4, Free    Collection Time: 04/21/19  1:41 AM   Result Value Ref Range    Thyroxine, Free 1.26 0.93 - 1.70 ng/dL   Lipid Panel    Collection Time: 04/21/19  3:45 AM   Result Value Ref Range    Cholesterol 119 <200 mg/dL    HDL 33 (L) >40 mg/dL    LDL Cholesterol 61 0 - 130 mg/dL    Chol/HDL Ratio 3.6 <5    Triglycerides 125 <150 mg/dL    VLDL NOT REPORTED 1 - 30 mg/dL   Basic Metabolic Panel w/ Reflex to MG    Collection Time: 04/21/19  3:45 AM   Result Value Ref Range    Glucose 128 (H) 70 - 99 mg/dL    BUN 15 8 - 23 mg/dL    CREATININE 1.01 (H) 0.50 - 0.90 mg/dL    Bun/Cre Ratio NOT REPORTED 9 - 20    Calcium 8.7 8.6 - 10.4 mg/dL    Sodium 140 135 - 144 mmol/L    Potassium 3.0 (L) 3.7 - 5.3 mmol/L    Chloride 107 98 - 107 mmol/L    CO2 19 (L) 20 - 31 mmol/L    Anion REPORTED NEGATIVE    Buprenorphine Urine NOT REPORTED NEGATIVE    Test Information       Assay provides medical screening only. The absence of expected drug(s) and/or metabolite(s) may indicate diluted or adulterated urine, limitations of testing or timing of collection. URINALYSIS WITH MICROSCOPIC    Collection Time: 04/21/19 11:34 AM   Result Value Ref Range    Color, UA YELLOW YELLOW    Turbidity UA CLEAR CLEAR    Glucose, Ur NEGATIVE NEGATIVE    Bilirubin Urine NEGATIVE NEGATIVE    Ketones, Urine NEGATIVE NEGATIVE    Specific Gravity, UA 1.043 (H) 1.005 - 1.030    Urine Hgb NEGATIVE NEGATIVE    pH, UA 5.5 5.0 - 8.0    Protein, UA TRACE (A) NEGATIVE    Urobilinogen, Urine Normal Normal    Nitrite, Urine NEGATIVE NEGATIVE    Leukocyte Esterase, Urine TRACE (A) NEGATIVE    -          WBC, UA 2 TO 5 0 - 5 /HPF    RBC, UA 0 TO 2 0 - 4 /HPF    Casts UA  0 - 8 /LPF     2 TO 5 HYALINE Reference range defined for non-centrifuged specimen. Crystals UA NOT REPORTED None /HPF    Epithelial Cells UA 0 TO 2 0 - 5 /HPF    Renal Epithelial, Urine NOT REPORTED 0 /HPF    Bacteria, UA NOT REPORTED None    Mucus, UA NOT REPORTED None    Trichomonas, UA NOT REPORTED None    Amorphous, UA NOT REPORTED None    Other Observations UA NOT REPORTED NOT REQ.     Yeast, UA NOT REPORTED None   Sodium, urine, random    Collection Time: 04/21/19 11:34 AM   Result Value Ref Range    Sodium,Ur 23 mmol/L   Creatinine, Random Urine    Collection Time: 04/21/19 11:34 AM   Result Value Ref Range    Creatinine, Ur 115.9 28.0 - 217.0 mg/dL   Protein / creatinine ratio, urine    Collection Time: 04/21/19 11:36 AM   Result Value Ref Range    Total Protein, Urine 30 mg/dL    Creatinine, Ur 114.5 28.0 - 217.0 mg/dL    Urine Total Protein Creatinine Ratio 0.26 (H) 0.00 - 0.20   Lactic Acid, Whole Blood    Collection Time: 04/21/19 12:03 PM   Result Value Ref Range    Lactic Acid, Whole Blood 2.1 0.7 - 2.1 mmol/L       IMAGING    CT Head  No acute

## 2019-04-21 NOTE — PLAN OF CARE
Timothy Saliva, RCPPatient Assessment complete. Acute kidney injury ([facility] [N17.9] . Vitals:    04/20/19 1945   BP: 109/68   Pulse: 93   Resp: 18   Temp: 98.3 °F (36.8 °C)   SpO2: 92%   . Patients home meds are   Prior to Admission medications    Medication Sig Start Date End Date Taking?  Authorizing Provider   ipratropium-albuterol (DUONEB) 0.5-2.5 (3) MG/3ML SOLN nebulizer solution Inhale 3 mLs into the lungs every 4 hours as needed for Shortness of Breath 1/11/19  Yes Pedro Urena MD   albuterol sulfate HFA (PROVENTIL HFA) 108 (90 Base) MCG/ACT inhaler Inhale 1-2 puffs into the lungs every 4 hours as needed for Wheezing or Shortness of Breath (Space out to every 6 hours as symptoms improve) 12/5/18  Yes Hussein Willoughby MD   acetaminophen (APAP EXTRA STRENGTH) 500 MG tablet Take 2 tablets by mouth every 8 hours as needed for Pain 8/12/18  Yes Geno Schwab, MD   benzonatate (TESSALON PERLES) 100 MG capsule Take 1 capsule by mouth 3 times daily as needed for Cough 3/11/19   Andre Lopez DO   carvedilol (COREG) 6.25 MG tablet Take 1 tablet by mouth 2 times daily (with meals) 2/7/19   Sheldon Guerra MD   ferrous sulfate (MELISSA-HAO) 325 (65 Fe) MG tablet Take 1 tablet by mouth daily 2/7/19   Sheldon Guerra MD   ondansetron (ZOFRAN ODT) 4 MG disintegrating tablet Take 1 tablet by mouth every 8 hours as needed for Nausea or Vomiting 1/11/19   Pedro Urena MD   melatonin 1 MG tablet Take 1 tablet by mouth nightly as needed for Sleep 1/11/19   Pedro Urena MD   benzocaine-menthol (CEPACOL SORE THROAT) 15-3.6 MG lozenge Take 1 lozenge by mouth every 2 hours as needed for Sore Throat 1/11/19   Pedro Urena MD   mometasone-formoterol [facility]) 100-5 MCG/ACT inhaler Inhale 2 puffs into the lungs 2 times daily 12/5/18   Hussein Willoughby MD   pantoprazole (PROTONIX) 40 MG tablet Take 1 tablet by mouth 2 times daily (before meals) 11/13/18   Lisa Ortiz MD   folic acid (FOLVITE) 1 MG tablet Take 1 tablet by mouth daily 10/13/18   Nicky Bishop MD   vitamin B-1 100 MG tablet Take 1 tablet by mouth daily 10/13/18   Nicky Bishop MD   vitamin B-12 250 MCG tablet Take 1 tablet by mouth daily 10/13/18   Nicky Bishop MD   docusate sodium (COLACE) 100 MG capsule Take 1 capsule by mouth 2 times daily 10/12/18   Nicky Bishop MD   gabapentin (NEURONTIN) 300 MG capsule Take 300 mg by mouth 3 times daily. Carroll Wiley Historical Provider, MD   buprenorphine-naloxone (SUBOXONE) 12-3 MG sublingual film Place 1 Film under the tongue daily. Carroll Wiley     Historical Provider, MD   guaiFENesin (MUCINEX) 600 MG extended release tablet Take 1 tablet by mouth 2 times daily 9/13/18   Ny Mcdonald MD   Multiple Vitamins-Minerals (THERAPEUTIC MULTIVITAMIN-MINERALS) tablet Take 1 tablet by mouth daily 8/12/18   Jaki Jason MD      Assessment Pt has a hx of COPD, home respiratory meds include Dulera BID, and Albuterol PRN    RR 16  Breath Sounds: clear/dim      · Bronchodilator assessment at level  1     · [x]    Bronchodilator Assessment  BRONCHODILATOR ASSESSMENT SCORE  Score 0 1 2 3 4 5   Breath Sounds   []  Patient Baseline [x]  No Wheeze good aeration []  Faint, scattered wheezing, good aeration []  Expiratory Wheezing and or moderately diminished []  Insp/Exp wheeze and/or very diminished []  Insp/Exp and/ or marked distress   Respiratory Rate   []  Patient Baseline [x]  Less than 20 []  Less than 20 []  20-25 []  Greater than 25 []  Greater than 25   Peak flow % of Pred or PB [x]  NA   []  Greater than 90%  []  81-90% []  71-80% []  Less than or equal to 70%  or unable to perform []  Unable due to Respiratory Distress   Dyspnea re []  Patient Baseline [x]  No SOB []  No SOB []  SOB on exertion []  SOB min activity []  At rest/acute   e FEV% Predicted       [x]  NA []  Above 69%  []  Unable []  Above 60-69%  []  Unable []  Above 50-59%  []  Unable []  Above 35-49%  []  Unable []  Less than 35%  []  Unable              Libby Marin 8:37 PM

## 2019-04-21 NOTE — DISCHARGE INSTR - COC
Continuity of Care Form    Patient Name: Tatianna Briseno   :  1955  MRN:  [id number]    Admit date:  2019  Discharge date:  ***    Code Status Order: Full Code   Advance Directives:   Advance Care Flowsheet Documentation     Date/Time Healthcare Directive Type of Healthcare Directive Copy in [address] Agent's Name Healthcare Agent's Phone Number    19 1800  Yes, patient has an advance directive for healthcare treatment  --  --  --  --  --          Admitting Physician:  Jaquelin Shanks MD  PCP: Sheldon Winkler    Discharging Nurse: [facility] Unit/Room#: 1826/9426-25  Discharging Unit Phone Number: ***    Emergency Contact:   Extended Emergency Contact Information  Primary Emergency Contact: Jose Eduardo Roberts [address] Phone: [phone]  Relation: Child  Secondary Emergency Contact: Yumi Madison [address] Phone: [phone]  Mobile Phone: [phone]  Relation: Brother/Sister    Past Surgical History:  Past Surgical History:   Procedure Laterality Date    APPENDECTOMY  2017     SECTION      GASTRIC BYPASS SURGERY      LAPAROSCOPIC APPENDECTOMY N/A 3/27/2017    APPENDECTOMY LAPAROSCOPIC performed by Cindy Lyn MD at [address] [place] [address]      hgsil    CO COLONOSCOPY W/BIOPSY SINGLE/MULTIPLE N/A 11/10/2018    [facility]    CO EGD TRANSORAL BIOPSY SINGLE/MULTIPLE N/A 2018    EGD ESOPHAGOGASTRODUODENOSCOPY performed by Jane Villalba MD at [address] [place] [address]      oesophagitis, candidiasis    UPPER GASTROINTESTINAL ENDOSCOPY  2018    UPPER GASTROINTESTINAL ENDOSCOPY N/A 2018    EGD CONTROL HEMORRHAGE performed by Jane Villalba MD at [address] [place] [address] History: There is no immunization history for the selected administration types on file for this patient.     Active Problems:  Patient Active Problem List   Diagnosis Code    Hypertension I10    Hepatitis C B19.20    History of substance abuse Z87.898    Chronic heart failure with reduced ejection fraction and diastolic dysfunction (HCC) I50.42    COPD (chronic obstructive pulmonary disease) (HCC) S05.3    Alcoholic cirrhosis of liver without ascites (HCC) K70.30    Acute GI hemorrhage K92.2    PUD (peptic ulcer disease) K27.9    Anemia due to gastrointestinal blood loss D50.0    Duodenal ulcer with hemorrhage K26.4    Psychosis, intermittent requring antipsychotic meds F29    Opiate withdrawal (HCC) F11.23    Severe sepsis (HCC) A41.9, R65.20    Pancreas head lesion S36.200A    Bacteremia R78.81    Sepsis (HCC) A41.9    Acute kidney injury ([address] N17.9    Ischemic stroke (HCC) I63.9    Stroke-like symptoms R29.90       Isolation/Infection:   Isolation          No Isolation            Nurse Assessment:  Last Vital Signs: /62   Pulse 54   Temp 98.2 °F (36.8 °C)   Resp 15   Ht 5' (1.524 m)   Wt 111 lb 5.3 oz (50.5 kg)   LMP 2015   SpO2 92%   BMI 21.74 kg/m²     Last documented pain score (0-10 scale): Pain Level: 8  Last Weight:   Wt Readings from Last 1 Encounters:   19 111 lb 5.3 oz (50.5 kg)     Mental Status:  {IP PT MENTAL STATUS:52118}    IV Access:  { SKINNY IV ACCESS:914539119}    Nursing Mobility/ADLs:  Walking   {P DME FTYI:901964861}  Transfer  {P DME RPPK:829949803}  Bathing  {P DME XSF}  Dressing  {CHP DME VOWN:878419086}  Toileting  {P DME KUOO:465867411}  Feeding  {P DME FBIE:521704010}  Med Admin  {P DME RHVH:360037863}  Med Delivery   { SKINNY MED Delivery:467747706}    Wound Care Documentation and Therapy:        Elimination:  Continence:   · Bowel: {YES / LB:60077}  · Bladder: {YES / IX:82531}  Urinary Catheter: {Urinary Catheter:376130884}   Colostomy/Ileostomy/Ileal Conduit: {YES / KP:82119}       Date of Last BM: ***    Intake/Output Summary (Last 24 hours) at 2019 1637  Last data filed at 2019 1100  Gross per 24 hour   Intake 1100 ml   Output 650 ml   Net 450 ml     I/O last 3 completed shifts:   In: 1100 [IV Piggyback:1100]  Out: 650 [Urine:650]    Safety Concerns:     [address] BAB[address] Safety Concerns:594419353}    Impairments/Disabilities:      [address] BABB Impairments/Disabilities:154928283}    Nutrition Therapy:  Current Nutrition Therapy:   [address] SKINNY Diet List:335215943}    Routes of Feeding: {CHP DME Other Feedings:879111052}  Liquids: {Slp liquid thickness:03127}  Daily Fluid Restriction: {CHP DME Yes amt example:412469398}  Last Modified Barium Swallow with Video (Video Swallowing Test): {Done Not Done MGHD:641104262}    Treatments at the Time of Hospital Discharge:   Respiratory Treatments: ***  Oxygen Therapy:  {Therapy; copd oxygen:84706}  Ventilator:    {[facility] Vent UJBT:843374913}    Rehab Therapies: {THERAPEUTIC INTERVENTION:6217376795}  Weight Bearing Status/Restrictions: {[facility] Weight Bearin}  Other Medical Equipment (for information only, NOT a DME order):  {EQUIPMENT:477617434}  Other Treatments: ***    Patient's personal belongings (please select all that are sent with patient):  {[facility] DME Belongings:824885640}    RN SIGNATURE:  {Esignature:394907925}    CASE MANAGEMENT/SOCIAL WORK SECTION    Inpatient Status Date: 2019    Readmission Risk Assessment Score:  Readmission Risk              Risk of Unplanned Readmission:        51           Discharging to Facility/ Agency   · Name: [address] [place] [address]  · Address:  · Phone: [phone]  · Fax:    Dialysis Facility (if applicable)   · Name:  · Address:  · Dialysis Schedule:  · Phone:  · Fax:    / signature: {Esignature:325354808}    PHYSICIAN SECTION    Prognosis: {Prognosis:6069245299}    Condition at Discharge: [address] Patient Condition:362703583}    Rehab Potential (if transferring to Rehab): {Prognosis:1355933401}    Recommended Labs or Other Treatments After Discharge: ***    Physician Certification: I certify the above information and transfer of Emi Daniels  is necessary for the continuing treatment of the diagnosis listed and that she requires {Admit to Appropriate Level of Care:94391} for {GREATER/LESS:149795733} 30 days.      Update Admission H&P: {CHP DME Changes in LBLBQ:837131993}    PHYSICIAN SIGNATURE:  {Esignature:870889265}

## 2019-04-21 NOTE — PROGRESS NOTES
Nutrition Assessment    Type and Reason for Visit: Initial, Positive Nutrition Screen(Weight Loss, Poor Appetite)    Nutrition Recommendations:   - Continue current diet. Encourage/monitor intakes as tolerated. - Will provide Ensure Enlive ONS with all meals. Nutrition Assessment: Pt moderately malnourished AEB poor oral intakes and weight loss of 12.3% x 3 months. Pt reports eating very poorly recently. Oral intakes improving since admission - consumed most of breakfast tray. Labs reviewed. Will provide Ensure ONS with all meals. Malnutrition Assessment:  · Malnutrition Status: Meets the criteria for moderate malnutrition  · Context: (Acute on Chronic)  · Findings of the 6 clinical characteristics of malnutrition (Minimum of 2 out of 6 clinical characteristics is required to make the diagnosis of moderate or severe Protein Calorie Malnutrition based on AND/ASPEN Guidelines):  1. Energy Intake-Less than or equal to 75% of estimated energy requirement, Greater than or equal to 5 days    2. Weight Loss-10% loss or greater, in 3 months  3. Fat Loss-No significant subcutaneous fat loss,    4. Muscle Loss-Mild muscle mass loss, Interosseous, Clavicles (pectoralis and deltoids)  5. Fluid Accumulation-No significant fluid accumulation,      Nutrition Risk Level: Moderate    Nutrient Needs:  · Estimated Daily Total Kcal: 7586-9453 kcal/day  · Estimated Daily Protein (g): 50-70 gm pro/day    Nutrition Diagnosis:   · Problem: Unintended weight loss  · Etiology: related to Insufficient energy/nutrient consumption     Signs and symptoms:  as evidenced by Weight loss greater than or equal to 7.5% in 3 months, need for oral supplements    Objective Information:  · Nutrition-Focused Physical Findings: Skin very dry.   · Wound Type: None  · Current Nutrition Therapies:  · Oral Diet Orders: General   · Oral Diet intake: 51-75%of breakfast of joyce, turkey sausage, egss, toast, and fruit,  · Oral Nutrition Supplement (ONS) Orders: None  · Anthropometric Measures:  · Ht: 5' (152.4 cm)   · Current Body Wt: 111 lb 5.3 oz (50.5 kg)  · Admission Body Wt: 111 lb 5.3 oz (50.5 kg)  · % Weight Change:  ,  12.6% x 3 months  · Ideal Body Wt: 100 lb (45.4 kg), % Ideal Body 111%  · BMI Classification: BMI 18.5 - 24.9 Normal Weight    Nutrition Interventions:   Continue current diet, Start ONS  Continued Inpatient Monitoring, Education Not Indicated    Nutrition Evaluation:   · Evaluation: Goals set   · Goals: Oral intakes to meet % of estimated nutrition needs.     · Monitoring: Meal Intake, Supplement Intake, Diet Tolerance, Skin Integrity, I&O, Weight, Mental Status/Confusion, Pertinent Labs    Electronically signed by Clarke Mcfadden RD, LD on 4/21/19 at 3:30 PM    Contact Number: [phone]

## 2019-04-21 NOTE — CONSULTS
Adarsh Light      CC/Reason for consult:  Right hip pain    HPI:      The patient is a 75 y.o. female who is a poor historian with a two-week history of right hip pain. Patient states that the pain came out of nowhere. She does not recall any falls or trauma. Patient denies any hip pain prior to 2 weeks ago. Pain is located diffusely throughout the right anterior compartment of the thigh into the right groin. Pain is poorly localized. Patient was able to ambulate on the hip prior to her admission. Per chart review patient took a Flexeril yesterday and was unable to assess for birthday party. At which time patient was acting altered and having difficulty speaking and therefore the neighbor called 911. Patient denies any recent fevers, chills, chest pain, nausea, vomiting, diarrhea.     Past Medical History:    Past Medical History:   Diagnosis Date    Appendicitis 3/27/2017    CHF (congestive heart failure) (HCC)     Chronic respiratory failure with hypoxia and hypercapnia (HCC)     Chronic rhinitis     Cigarette smoker     COPD (chronic obstructive pulmonary disease) ([address] 2018    Depression     Dysphagia     Essential hypertension 2018    GERD (gastroesophageal reflux disease)     Heart failure, diastolic, with acute decompensation ([address] 2018    Hepatitis C, chronic (HCC)     History of smoking at least 1 pack per day for at least 30 years     Hyperlipidemia     Hypertension     Migraine     Moderate COPD (chronic obstructive pulmonary disease) (HCC)     Multiple transfusions     Osteoarthritis     hands    Pneumonia     Scoliosis     Substance abuse ([address]     ivda and cocaine abuse in past       Past Surgical History:    Past Surgical History:   Procedure Laterality Date    APPENDECTOMY  2017     SECTION      GASTRIC BYPASS SURGERY      LAPAROSCOPIC APPENDECTOMY N/A 3/27/2017    APPENDECTOMY LAPAROSCOPIC performed by Dong Jean-Baptiste MD Leslie at [facility]  2002    hgsil    OK COLONOSCOPY W/BIOPSY SINGLE/MULTIPLE N/A 11/10/2018    COLONOSCOPY WITH BIOPSY performed by Giovanni Montemayor MD at [facility] EGD TRANSORAL BIOPSY SINGLE/MULTIPLE N/A 11/9/2018    EGD ESOPHAGOGASTRODUODENOSCOPY performed by Misael Wolf MD at [facility]  2008    oesophagitis, candidiasis    UPPER GASTROINTESTINAL ENDOSCOPY  11/05/2018    UPPER GASTROINTESTINAL ENDOSCOPY N/A 11/5/2018    EGD CONTROL HEMORRHAGE performed by Misael Wolf MD at [place] [address]       Medications Prior to Admission:   Prior to Admission medications    Medication Sig Start Date End Date Taking?  Authorizing Provider   ipratropium-albuterol (DUONEB) 0.5-2.5 (3) MG/3ML SOLN nebulizer solution Inhale 3 mLs into the lungs every 4 hours as needed for Shortness of Breath 1/11/19  Yes Kaushal Carrizales MD   albuterol sulfate HFA (PROVENTIL HFA) 108 (90 Base) MCG/ACT inhaler Inhale 1-2 puffs into the lungs every 4 hours as needed for Wheezing or Shortness of Breath (Space out to every 6 hours as symptoms improve) 12/5/18  Yes [address] LESLY MD   acetaminophen (APAP EXTRA STRENGTH) 500 MG tablet Take 2 tablets by mouth every 8 hours as needed for Pain 8/12/18  Yes Jazlyn Yun MD   benzonatate (TESSALON PERLES) 100 MG capsule Take 1 capsule by mouth 3 times daily as needed for Cough 3/11/19   Chuck Lopez, DO   carvedilol (COREG) 6.25 MG tablet Take 1 tablet by mouth 2 times daily (with meals) 2/7/19   Pooja Virgen MD   ferrous sulfate (MELISSA-HAO) 325 (65 Fe) MG tablet Take 1 tablet by mouth daily 2/7/19   Pooja Virgen MD   ondansetron (ZOFRAN ODT) 4 MG disintegrating tablet Take 1 tablet by mouth every 8 hours as needed for Nausea or Vomiting 1/11/19   Kaushal Carrizales MD   melatonin 1 MG tablet Take 1 tablet by mouth nightly as needed for Sleep 1/11/19   Kaushal Carrizales MD   benzocaine-menthol (CEPACOL SORE THROAT) 15-3.6 MG lozenge Take 1 lozenge by mouth every 2 hours as needed for Sore Throat 19   Kaushal Carrizales MD   mometasone-formoterol [facility]) 100-5 MCG/ACT inhaler Inhale 2 puffs into the lungs 2 times daily 18   Guanaco Cunningham MD   pantoprazole (PROTONIX) 40 MG tablet Take 1 tablet by mouth 2 times daily (before meals) 18   Tito Nation MD   folic acid (FOLVITE) 1 MG tablet Take 1 tablet by mouth daily 10/13/18   Lizet Marin MD   vitamin B-1 100 MG tablet Take 1 tablet by mouth daily 10/13/18   Lizet Marin MD   vitamin B-12 250 MCG tablet Take 1 tablet by mouth daily 10/13/18   Lizet Marin MD   docusate sodium (COLACE) 100 MG capsule Take 1 capsule by mouth 2 times daily 10/12/18   Lizet Marin MD   gabapentin (NEURONTIN) 300 MG capsule Take 300 mg by mouth 3 times daily. Shakeel Price Historical Provider, MD   buprenorphine-naloxone (SUBOXONE) 12-3 MG sublingual film Place 1 Film under the tongue daily. Shakeel Price     Historical Provider, MD   guaiFENesin (MUCINEX) 600 MG extended release tablet Take 1 tablet by mouth 2 times daily 18   Sharita Gallardo MD   Multiple Vitamins-Minerals (THERAPEUTIC MULTIVITAMIN-MINERALS) tablet Take 1 tablet by mouth daily 18   Jazlyn Yun MD       Allergies:    Aspirin; Brittany Manteo [cephalexin]; and Motrin [ibuprofen]    Social History:   Social History     Socioeconomic History    Marital status: Single     Spouse name: None    Number of children: None    Years of education: None    Highest education level: None   Occupational History    None   Social Needs    Financial resource strain: None    Food insecurity:     Worry: None     Inability: None    Transportation needs:     Medical: None     Non-medical: None   Tobacco Use    Smoking status: Former Smoker     Packs/day: 1.00     Years: 40.00     Pack years: 40.00     Types: Cigarettes     Start date:      Last attempt to quit: 2018     Years since quittin.4    Smokeless tobacco: Never Used   Substance and Sexual Activity    Alcohol use: No     Alcohol/week: 0.0 oz    Drug use: No     Types: IV, Cocaine     Comment: past history    Sexual activity: None   Lifestyle    Physical activity:     Days per week: None     Minutes per session: None    Stress: None   Relationships    Social connections:     Talks on phone: None     Gets together: None     Attends Latter-day service: None     Active member of club or organization: None     Attends meetings of clubs or organizations: None     Relationship status: None    Intimate partner violence:     Fear of current or ex partner: None     Emotionally abused: None     Physically abused: None     Forced sexual activity: None   Other Topics Concern    None   Social History Narrative    None       Family History:  History reviewed. No pertinent family history. REVIEW OF SYSTEMS:   Review of Systems   Constitutional: Negative for fever and chills. HENT: Negative for congestion. Eyes: Negative for blurred vision and double vision. Respiratory: Negative for cough, shortness of breath and wheezing. Cardiovascular: Negative for chest pain and palpitations. Gastrointestinal: Negative for nausea. Negative for vomiting. Musculoskeletal: Positive for right leg pain. Skin: Negative for itching and rash. Neurological: Negative for dizziness, sensory change and headaches. Psychiatric/Behavioral: Negative for depression and suicidal ideas. PHYSICAL EXAM:  Blood pressure 127/62, pulse 54, temperature 98.2 °F (36.8 °C), resp. rate 15, height 5' (1.524 m), weight 111 lb 5.3 oz (50.5 kg), last menstrual period 03/18/2015, SpO2 92 %. Gen: alert and oriented, NAD, cooperative  Head: normocephalic atraumatic   Neck: supple  Chest: Symmetric chest excursion, non labored breathing. Heart: Regular rate, no edema, distal pulses 2+   Abdomen: non distended  RLE: No ecchymoses, abrasions, deformity, erythema or lacerations. Skin intact.   Tender to palpation over the anterior aspect of the hip as well as diffusely down the anterior aspect of the thigh and medial aspect of the knee. No TTP or crepitus to tibia/fibula, ankle or foot. Pain with log roll. Unable to perform straight leg raise today diffuse weakness. Range of motion from 0-90° of hip flexion however painful throughout range of motion. . Compartments soft and compressible. Foot is warm and perfused. EHL/FHL/TA/GS complex motor intact. Sural, saphenous, superificial/deep peroneal, and plantar nerve distribution SILT. Dorsalis pedis/posterior tibial pulses 2+ with BCR. LABS:  Recent Labs     04/20/19  1526 04/21/19  0345   WBC 8.0 7.2   HGB 11.8* 10.3*   HCT 40.1 35.0*   * 456*   INR 1.1  --     140   K 3.2* 3.0*   BUN 20 15   CREATININE 2.27* 1.01*   GLUCOSE 88 128*        Radiology:   AP/lateral of a right hip demonstrating flattening of the right femoral head consistent with AVN. Joint space narrowing with osteophytosis noted off of the dorsal aspect of the humeral head. No evidence of acute fractures or dislocations. A/P: 75 y.o. female being seen for right hip AVN. - No urgent/emergent orthopedic intervention indicated at this time. - Weightbearing as tolerated RLE  - Pain control and medical management per primary  - MRI ordered per medicine team. We'll follow up results. - Ice (20 minutes on and off 1 hour) as needed for swelling/pain  - DVT ppx: EPC & hep. Management per primary.   Berenice Ortiz from ortho perspective  - Will discuss case with Dr. Breanna Thompson  -If MRI negative for acute fracture, Berenice Ortiz to DC from ortho perspective and follow up with Dr. Breanna Thompson in 7-10 days.   - Please page DO Ortho with any questions or concerns    Devi Rios DO  1:20 PM 4/21/2019

## 2019-04-21 NOTE — CONSULTS
Endovascular Neurosurgery Note for  Stroke Priority @ 14:05  4/20/2019 10:39 PM  Pt Name: Marquis Keane  MRN: [id number]  YOB: 1955  Date of evaluation: 4/20/2019  Primary Care Physician: Ruslan Lomelilucien Estrada is a 75 y.o. female with history of hld, htn, copd and prior migraines with altered mental status lkw midnight.  + flexeril for back pain. neightbor noted ams and called 911 hypotensive in the sbp on initial exam.  nih 3 including chronic right lle weakness. No iv tpa as out of window    Allergies  is allergic to aspirin; keflet [cephalexin]; and motrin [ibuprofen]. Medications  Prior to Admission medications    Medication Sig Start Date End Date Taking?  Authorizing Provider   ipratropium-albuterol (DUONEB) 0.5-2.5 (3) MG/3ML SOLN nebulizer solution Inhale 3 mLs into the lungs every 4 hours as needed for Shortness of Breath 1/11/19  Yes Daly Al MD   albuterol sulfate HFA (PROVENTIL HFA) 108 (90 Base) MCG/ACT inhaler Inhale 1-2 puffs into the lungs every 4 hours as needed for Wheezing or Shortness of Breath (Space out to every 6 hours as symptoms improve) 12/5/18  Yes Miriam Fernandez MD   acetaminophen (APAP EXTRA STRENGTH) 500 MG tablet Take 2 tablets by mouth every 8 hours as needed for Pain 8/12/18  Yes Madeleine Bethea MD   benzonatate (TESSALON PERLES) 100 MG capsule Take 1 capsule by mouth 3 times daily as needed for Cough 3/11/19   Danay Lopez DO   carvedilol (COREG) 6.25 MG tablet Take 1 tablet by mouth 2 times daily (with meals) 2/7/19   Constance Chairez MD   ferrous sulfate (MELISSA-HAO) 325 (65 Fe) MG tablet Take 1 tablet by mouth daily 2/7/19   Constance Chairez MD   ondansetron (ZOFRAN ODT) 4 MG disintegrating tablet Take 1 tablet by mouth every 8 hours as needed for Nausea or Vomiting 1/11/19   Daly Al MD   melatonin 1 MG tablet Take 1 tablet by mouth nightly as needed for Sleep 1/11/19   Daly Al MD   benzocaine-menthol (CEPACOL SORE THROAT) 15-3.6 MG lozenge Take 1 lozenge by mouth every 2 hours as needed for Sore Throat 1/11/19   Violeta Betancourt MD   mometasone-formoterol [facility]) 100-5 MCG/ACT inhaler Inhale 2 puffs into the lungs 2 times daily 12/5/18   Wale Yarbrough MD   pantoprazole (PROTONIX) 40 MG tablet Take 1 tablet by mouth 2 times daily (before meals) 11/13/18   Bartlett Gowers, MD   folic acid (FOLVITE) 1 MG tablet Take 1 tablet by mouth daily 10/13/18   Araceli Olmos MD   vitamin B-1 100 MG tablet Take 1 tablet by mouth daily 10/13/18   Araceli Olmos MD   vitamin B-12 250 MCG tablet Take 1 tablet by mouth daily 10/13/18   Araceli Olmos MD   docusate sodium (COLACE) 100 MG capsule Take 1 capsule by mouth 2 times daily 10/12/18   Araceli Olmos MD   gabapentin (NEURONTIN) 300 MG capsule Take 300 mg by mouth 3 times daily. Tammy Payne Historical Provider, MD   buprenorphine-naloxone (SUBOXONE) 12-3 MG sublingual film Place 1 Film under the tongue daily. Tammy Payne     Historical Provider, MD   guaiFENesin (MUCINEX) 600 MG extended release tablet Take 1 tablet by mouth 2 times daily 9/13/18   Jamarcus Green MD   Multiple Vitamins-Minerals (THERAPEUTIC MULTIVITAMIN-MINERALS) tablet Take 1 tablet by mouth daily 8/12/18   Viky Paredes MD    Scheduled Meds:   sodium chloride flush  10 mL Intravenous 2 times per day    folic acid  1 mg Oral Daily    mometasone-formoterol  2 puff Inhalation BID    pantoprazole  40 mg Oral Daily    heparin (porcine)  5,000 Units Subcutaneous 3 times per day    sodium chloride flush  10 mL Intravenous 2 times per day    [START ON 4/21/2019] clopidogrel  75 mg Oral Daily    atorvastatin  40 mg Oral Nightly    [START ON 4/21/2019] buprenorphine-naloxone  1 tablet Sublingual Daily     Continuous Infusions:   sodium chloride 125 mL/hr at 04/20/19 1903     PRN Meds:.sodium chloride flush, albuterol sulfate HFA, benzonatate, ipratropium-albuterol, sodium chloride flush, magnesium sulfate, magnesium hydroxide, ondansetron, acetaminophen, albuterol  Past Medical History   has a past medical history of Appendicitis, CHF (congestive heart failure) (HCC), Chronic respiratory failure with hypoxia and hypercapnia (HCC), Chronic rhinitis, Cigarette smoker, COPD (chronic obstructive pulmonary disease) ([facility] Depression, Dysphagia, Essential hypertension, GERD (gastroesophageal reflux disease), Heart failure, diastolic, with acute decompensation ([facility] Hepatitis C, chronic ([email or web address] History of smoking at least 1 pack per day for at least 30 years, Hyperlipidemia, Hypertension, Migraine, Moderate COPD (chronic obstructive pulmonary disease) ([facility] Multiple transfusions, Osteoarthritis, Pneumonia, Scoliosis, and Substance abuse ([email or web address]   Social History  Social History     Socioeconomic History    Marital status: Single     Spouse name: Not on file    Number of children: Not on file    Years of education: Not on file    Highest education level: Not on file   Occupational History    Not on file   Social Needs    Financial resource strain: Not on file    Food insecurity:     Worry: Not on file     Inability: Not on file    Transportation needs:     Medical: Not on file     Non-medical: Not on file   Tobacco Use    Smoking status: Former Smoker     Packs/day: 1.00     Years: 40.00     Pack years: 40.00     Types: Cigarettes     Start date:      Last attempt to quit: 2018     Years since quittin.4    Smokeless tobacco: Never Used   Substance and Sexual Activity    Alcohol use: No     Alcohol/week: 0.0 oz    Drug use: No     Types: IV, Cocaine     Comment: past history    Sexual activity: Not on file   Lifestyle    Physical activity:     Days per week: Not on file     Minutes per session: Not on file    Stress: Not on file   Relationships    Social connections:     Talks on phone: Not on file     Gets together: Not on file     Attends Cheondoism service: Not on file     Active member of club or organization: Not on file greater than 50% of time spent face to face, counseling, coordinating care, examining patient, reviewing images and labs personally, and speaking with team.      Taras Collet, MD Pager: [phone]  Stroke, [facility]  [address] [place]  Electronically signed 4/20/2019 at 10:39 PM

## 2019-04-21 NOTE — CARE COORDINATION
Case Management Initial Discharge Plan  Emi Daniels,             Met with:patient to discuss discharge plans. Information verified: address, contacts, phone number, , insurance Yes  PCP: Tino Encinas  Date of last visit: last Thursday    Insurance Provider: [employer]    Discharge Planning    Living Arrangements:  Children, Family Members   Support Systems:  Family Members, /, Home Care Staff    Home has multi stories apt building  no stairs to climb to get into front door, uses elevator, no stairs to climb to reach second floor  Location of bedroom/bathroom in home main level once inside. Patient able to perform ADL's:Independent    Current Services (outpatient & in home) 1 Joan Bullard believes with [address] also has a care manager from MD's office  DME equipment: Urmila Bhavana @[facility] [place] [facility]  DME provider: [address]: [address] Medications:  No  Does patient want to participate in local refill/ meds to beds program?  No    Potential Assistance Needed:  Home Care    Patient agreeable to home care: Yes  Freedom of choice provided:  yes    Prior SNF/Rehab Placement and Facility: No  Agreeable to SNF/Rehab: No  [place] of choice provided: n/a   Evaluation: no    Expected Discharge date:  19  Patient expects to be discharged to:  Home  Follow Up Appointment: Best Day/ Time: Monday AM    Transportation provider: Pt's son  Transportation arrangements needed for discharge: No Pt's son is available    Readmission Risk              Risk of Unplanned Readmission:        51             Does patient have a readmission risk score greater than 14?: Yes  If yes, follow-up appointment must be made within 7 days of discharge. Discharge Plan: Home independently with family support. Pt reports she has home care with [address]. She would like to resume services.   Son to provide transportation  Needs PCP appt. Spoke with Cecilia Hawk at [facility], recently d/c from services in January.   E-referral to [facility]          Electronically signed by Esteban Gil RN on 4/21/19 at 4:30 PM

## 2019-04-21 NOTE — PROGRESS NOTES
[address] [place] [address]     Department of Internal Medicine - Staff Internal Medicine Service     DAILY PROGRESS NOTE - TEAM       Patient:  Leandro Nuñez  YOB: 1955  MRN: [id number]     Acct: [de-identified]     Admit date: 4/20/2019  Admitting Diagnosis: Metabolic encephalopathy likely due to ischemic stroke    Subjective:   Pt seen and Chart reviewed. VS stable, Afebrile  Patient's mentation is much better. Blood pressure improving slowly. Acute kidney injury resulting. Patient refused urinary catheter. Patient states that she wants to urinate on her own. Urine output adequate as of now. Lactate levels elevated at 2.5 we will trend lactate every 6. Lipid panel within normal limit except HDL which is low. Ammonia levels  53. Total protein at 6.2. Vitamin B12 and folate within normal limit    Intake/Output Summary (Last 24 hours) at 4/21/2019 0800  Last data filed at 4/20/2019 1737  Gross per 24 hour   Intake 1100 ml   Output --   Net 1100 ml         REVIEW OF SYSTEMS:  · Constitutional: Negative for Fever, chills  · Eyes: Negative for visual changes, diplopia  · ENT: Negative for mouth sores, sore throat. · Cardiovascular: Negative for lightheadedness ,chest pain, palpitations   · Respiratory:Negative for Shortness of breath,cough or wheezing. · Gastrointestinal: Negative for nausea/vomiting, change in bowel habits, abdominal pain  · Genitourinary:Negative for change in bladder habits, dysuria, hematuria.   · Musculoskeletal: Negative for joint pain   · Neurological: Negative for headache, change in muscle strength numbness/tingling  Objective:   /68   Pulse 93   Temp 98.3 °F (36.8 °C)   Resp 18   Ht 5' (1.524 m)   Wt 111 lb 5.3 oz (50.5 kg)   LMP 03/18/2015   SpO2 92%   BMI 21.74 kg/m²       Intake/Output Summary (Last 24 hours) at 4/21/2019 0800  Last data filed at 4/20/2019 1737  Gross per 24 hour   Intake 1100 ml   Output --   Net 1100 ml         Physical Exam Constitutional: She is oriented to person, place, and time. She appears well-developed and well-nourished. She appears distressed. HENT:   Head: Normocephalic. Slight facial droop on the left side, expressive aphasia  which is improving. Eyes: Pupils are equal, round, and reactive to light. Neck: Normal range of motion. No thyromegaly present. Cardiovascular: Normal rate, regular rhythm, normal heart sounds and intact distal pulses. Exam reveals no gallop and no friction rub. No murmur heard. Pulmonary/Chest: Effort normal and breath sounds normal. No respiratory distress. She has no wheezes. Abdominal: Soft. Bowel sounds are normal. She exhibits no distension. There is no tenderness. There is no rebound. No hernia. Musculoskeletal: Normal range of motion. She exhibits no edema. Weakness bilaterally in the lower extremities , improving . Lymphadenopathy:     She has no cervical adenopathy. Neurological: She is alert and oriented to person, place, and time. Skin: Skin is warm. Capillary refill takes less than 2 seconds. She is not diaphoretic. Psychiatric: She has a normal mood and affect. Her behavior is normal. Judgment and thought content normal.     Medications:   MEDICATIONS:  Prior to Admission medications    Medication Sig Start Date End Date Taking?  Authorizing Provider   ipratropium-albuterol (DUONEB) 0.5-2.5 (3) MG/3ML SOLN nebulizer solution Inhale 3 mLs into the lungs every 4 hours as needed for Shortness of Breath 1/11/19  Yes Keshawn Lucio MD   albuterol sulfate HFA (PROVENTIL HFA) 108 (90 Base) MCG/ACT inhaler Inhale 1-2 puffs into the lungs every 4 hours as needed for Wheezing or Shortness of Breath (Space out to every 6 hours as symptoms improve) 12/5/18  Yes Adriana Jay MD   acetaminophen (APAP EXTRA STRENGTH) 500 MG tablet Take 2 tablets by mouth every 8 hours as needed for Pain 8/12/18  Yes Hill Zheng MD   benzonatate (TESSALON PERLES) 100 MG capsule Take 1 capsule by mouth 3 times daily as needed for Cough 3/11/19   Mahad Lopez DO   carvedilol (COREG) 6.25 MG tablet Take 1 tablet by mouth 2 times daily (with meals) 2/7/19   Clemencia Cash MD   ferrous sulfate (MELISSA-HAO) 325 (65 Fe) MG tablet Take 1 tablet by mouth daily 2/7/19   Clemencia Cash MD   ondansetron (ZOFRAN ODT) 4 MG disintegrating tablet Take 1 tablet by mouth every 8 hours as needed for Nausea or Vomiting 1/11/19   Vincente Olszewski, MD   melatonin 1 MG tablet Take 1 tablet by mouth nightly as needed for Sleep 1/11/19   Vincente Olszewski, MD   benzocaine-menthol (CEPACOL SORE THROAT) 15-3.6 MG lozenge Take 1 lozenge by mouth every 2 hours as needed for Sore Throat 1/11/19   Vincente Olszewski, MD   mometasone-formoterol [facility]) 100-5 MCG/ACT inhaler Inhale 2 puffs into the lungs 2 times daily 12/5/18   Roya Jones MD   pantoprazole (PROTONIX) 40 MG tablet Take 1 tablet by mouth 2 times daily (before meals) 11/13/18   Jessica Bedoya MD   folic acid (FOLVITE) 1 MG tablet Take 1 tablet by mouth daily 10/13/18   Mikayla Otoole MD   vitamin B-1 100 MG tablet Take 1 tablet by mouth daily 10/13/18   Mikayla Otoole MD   vitamin B-12 250 MCG tablet Take 1 tablet by mouth daily 10/13/18   Mikayla Otoole MD   docusate sodium (COLACE) 100 MG capsule Take 1 capsule by mouth 2 times daily 10/12/18   Mikayla Otoole MD   gabapentin (NEURONTIN) 300 MG capsule Take 300 mg by mouth 3 times daily. Greg Dye MD   buprenorphine-naloxone (SUBOXONE) 12-3 MG sublingual film Place 1 Film under the tongue daily. Greg Dye MD   guaiFENesin (MUCINEX) 600 MG extended release tablet Take 1 tablet by mouth 2 times daily 9/13/18   Cyndi Velazquez MD   Multiple Vitamins-Minerals (THERAPEUTIC MULTIVITAMIN-MINERALS) tablet Take 1 tablet by mouth daily 8/12/18   Luis Miguel Martinez MD     Scheduled Meds:   potassium chloride  40 mEq Oral Once    magnesium sulfate  1 g Intravenous Once    sodium chloride Metabolic encephalopathy. Concern for ischemic stroke. Etiology unknown as of now. Left-sided facial droop and expressive aphasia. Bilateral upper extremity strength normal.  Lower extremity strength 3/5. CT unremarkable. Neurology on board . Echo pending. Loaded with Plavix 300 mg. Continue Plavix 75 mg daily. No aspirin as the patient has aspirin allergy. Folate 1 mg twice a day. Lipitor 40. Low HDL but normal cholesterol, TSH slightly low. Pending  HbA1c, B12 WNL,  working with  PT OT. Hydration with IV fluids at 125 mL per hour normal saline. Neuro checks per protocol.     2. CRYSTAL. Resolving. Likely due to dehydration. Pending Urinalysis with microscopic, urinalysis reflex to culture, Obtain Fe-Na. Urine protein creatinine ratio,  Urine eosinophils. Renal ultrasound. Continue hydration with IV fluids at 125 ML per hour normal saline. Follow with CBC and BMP daily.     3. Concern for multiple myeloma. Multiple small lucent lesions noted throughout cervical spine on imaging. Could be due to or osseous metastasis/multiple myeloma/ severe osteopenia. Pending  SPEP, UPEP. CBC and BMP daily.     4.  Elevated troponin. 23-->21. Trend down. Could be due to demand ischemia. EKG unremarkable.     5. History of COPD. Continue breathing treatment. DuoNeb. Dulera. RT eval and treat. RT aerosol protocol.       6. History of polysubstance abuse. Obtain drug screen.     7. History of nicotine dependence. Smoking cessation counseling.     8. Thyroid nodules. Heterogeneously enlarged thyroid gland with multiple thyroid nodules noted on CT neck. Obtain TSH.     9. History of hypertension. Patient on Coreg 6.25 twice a day at home. Blood pressures on the lower side for now. Resume as needed. BP checks per protocol.     10. History of hepatitis C.    11.  Hypokalemia. Replace potassium per protocol. Replace magnesium. Potassium more than 4 magnesium more than 2.    12.  Lactic acidosis.   Likely due to dehydration. Trend lactate every 6 and continue with hydration.        DVT prophylaxis: heparin  'Diet: general  PT/OT evaluation  Disposition: med-surg/ Step down    Rina Garcia M.D. Internal Medicine Resident , PGY-1  [address] [place] [address]  04/21/19 8:00 AM      Attending Physician Statement  I have discussed the care of Emi Daniels, including pertinent history and exam findings with the resident. I have reviewed the key elements of all parts of the encounter with the resident. I have seen and examined the patient with the resident and the key elements of all parts of the encounter have been performed by me.         Jaclyn Rai MD  Attending and Faculty Internal Medicine  [address] [facility]  Internal Medicine [address] [place] [address]   4/21/19

## 2019-04-22 ENCOUNTER — APPOINTMENT (OUTPATIENT)
Dept: MRI IMAGING | Age: 64
DRG: 469 | End: 2019-04-22
Payer: MEDICARE

## 2019-04-22 VITALS
RESPIRATION RATE: 20 BRPM | BODY MASS INDEX: 21.86 KG/M2 | HEART RATE: 100 BPM | HEIGHT: 60 IN | SYSTOLIC BLOOD PRESSURE: 173 MMHG | OXYGEN SATURATION: 97 % | WEIGHT: 111.33 LBS | TEMPERATURE: 98.3 F | DIASTOLIC BLOOD PRESSURE: 88 MMHG

## 2019-04-22 LAB
ABSOLUTE EOS #: 0.11 K/UL (ref 0–0.44)
ABSOLUTE IMMATURE GRANULOCYTE: 0.05 K/UL (ref 0–0.3)
ABSOLUTE LYMPH #: 1.89 K/UL (ref 1.1–3.7)
ABSOLUTE MONO #: 0.7 K/UL (ref 0.1–1.2)
ANION GAP SERPL CALCULATED.3IONS-SCNC: 13 MMOL/L (ref 9–17)
BASOPHILS # BLD: 0 % (ref 0–2)
BASOPHILS ABSOLUTE: 0 K/UL (ref 0–0.2)
BUN BLDV-MCNC: 5 MG/DL (ref 8–23)
BUN/CREAT BLD: ABNORMAL (ref 9–20)
CALCIUM SERPL-MCNC: 8.8 MG/DL (ref 8.6–10.4)
CHLORIDE BLD-SCNC: 110 MMOL/L (ref 98–107)
CO2: 19 MMOL/L (ref 20–31)
CREAT SERPL-MCNC: 0.52 MG/DL (ref 0.5–0.9)
DIFFERENTIAL TYPE: ABNORMAL
EKG ATRIAL RATE: 91 BPM
EKG ATRIAL RATE: 96 BPM
EKG P AXIS: 73 DEGREES
EKG P AXIS: 75 DEGREES
EKG P-R INTERVAL: 154 MS
EKG P-R INTERVAL: 168 MS
EKG Q-T INTERVAL: 342 MS
EKG Q-T INTERVAL: 344 MS
EKG QRS DURATION: 70 MS
EKG QRS DURATION: 72 MS
EKG QTC CALCULATION (BAZETT): 423 MS
EKG QTC CALCULATION (BAZETT): 432 MS
EKG R AXIS: 38 DEGREES
EKG R AXIS: 54 DEGREES
EKG T AXIS: 61 DEGREES
EKG T AXIS: 64 DEGREES
EKG VENTRICULAR RATE: 91 BPM
EKG VENTRICULAR RATE: 96 BPM
EOSINOPHILS RELATIVE PERCENT: 2 % (ref 1–4)
GFR AFRICAN AMERICAN: >60 ML/MIN
GFR NON-AFRICAN AMERICAN: >60 ML/MIN
GFR SERPL CREATININE-BSD FRML MDRD: ABNORMAL ML/MIN/{1.73_M2}
GFR SERPL CREATININE-BSD FRML MDRD: ABNORMAL ML/MIN/{1.73_M2}
GLUCOSE BLD-MCNC: 95 MG/DL (ref 70–99)
HCT VFR BLD CALC: 37.8 % (ref 36.3–47.1)
HEMOGLOBIN: 11.7 G/DL (ref 11.9–15.1)
IMMATURE GRANULOCYTES: 1 %
LACTIC ACID, WHOLE BLOOD: 1.7 MMOL/L (ref 0.7–2.1)
LACTIC ACID, WHOLE BLOOD: 1.8 MMOL/L (ref 0.7–2.1)
LYMPHOCYTES # BLD: 35 % (ref 24–43)
MCH RBC QN AUTO: 25.8 PG (ref 25.2–33.5)
MCHC RBC AUTO-ENTMCNC: 31 G/DL (ref 28.4–34.8)
MCV RBC AUTO: 83.3 FL (ref 82.6–102.9)
MONOCYTES # BLD: 13 % (ref 3–12)
MORPHOLOGY: ABNORMAL
NRBC AUTOMATED: 0 PER 100 WBC
PDW BLD-RTO: 20.7 % (ref 11.8–14.4)
PLATELET # BLD: ABNORMAL K/UL (ref 138–453)
PLATELET ESTIMATE: ABNORMAL
PLATELET, FLUORESCENCE: NORMAL K/UL (ref 138–453)
PLATELET, IMMATURE FRACTION: NORMAL % (ref 1.1–10.3)
PMV BLD AUTO: ABNORMAL FL (ref 8.1–13.5)
POTASSIUM SERPL-SCNC: 4.2 MMOL/L (ref 3.7–5.3)
RBC # BLD: 4.54 M/UL (ref 3.95–5.11)
RBC # BLD: ABNORMAL 10*6/UL
SEG NEUTROPHILS: 49 % (ref 36–65)
SEGMENTED NEUTROPHILS ABSOLUTE COUNT: 2.65 K/UL (ref 1.5–8.1)
SODIUM BLD-SCNC: 142 MMOL/L (ref 135–144)
WBC # BLD: 5.4 K/UL (ref 3.5–11.3)
WBC # BLD: ABNORMAL 10*3/UL

## 2019-04-22 PROCEDURE — 6360000002 HC RX W HCPCS: Performed by: STUDENT IN AN ORGANIZED HEALTH CARE EDUCATION/TRAINING PROGRAM

## 2019-04-22 PROCEDURE — 2580000003 HC RX 258: Performed by: STUDENT IN AN ORGANIZED HEALTH CARE EDUCATION/TRAINING PROGRAM

## 2019-04-22 PROCEDURE — 85025 COMPLETE CBC W/AUTO DIFF WBC: CPT

## 2019-04-22 PROCEDURE — 6370000000 HC RX 637 (ALT 250 FOR IP): Performed by: STUDENT IN AN ORGANIZED HEALTH CARE EDUCATION/TRAINING PROGRAM

## 2019-04-22 PROCEDURE — 36415 COLL VENOUS BLD VENIPUNCTURE: CPT

## 2019-04-22 PROCEDURE — 70551 MRI BRAIN STEM W/O DYE: CPT

## 2019-04-22 PROCEDURE — 83605 ASSAY OF LACTIC ACID: CPT

## 2019-04-22 PROCEDURE — 99232 SBSQ HOSP IP/OBS MODERATE 35: CPT | Performed by: NURSE PRACTITIONER

## 2019-04-22 PROCEDURE — 94640 AIRWAY INHALATION TREATMENT: CPT

## 2019-04-22 PROCEDURE — 80048 BASIC METABOLIC PNL TOTAL CA: CPT

## 2019-04-22 PROCEDURE — 99233 SBSQ HOSP IP/OBS HIGH 50: CPT | Performed by: INTERNAL MEDICINE

## 2019-04-22 PROCEDURE — 85055 RETICULATED PLATELET ASSAY: CPT

## 2019-04-22 RX ORDER — CARVEDILOL 6.25 MG/1
6.25 TABLET ORAL 2 TIMES DAILY WITH MEALS
Status: DISCONTINUED | OUTPATIENT
Start: 2019-04-22 | End: 2019-04-22 | Stop reason: HOSPADM

## 2019-04-22 RX ORDER — LORAZEPAM 2 MG/ML
1 INJECTION INTRAMUSCULAR
Status: COMPLETED | OUTPATIENT
Start: 2019-04-22 | End: 2019-04-22

## 2019-04-22 RX ADMIN — Medication 10 ML: at 07:56

## 2019-04-22 RX ADMIN — PANTOPRAZOLE SODIUM 40 MG: 40 TABLET, DELAYED RELEASE ORAL at 07:55

## 2019-04-22 RX ADMIN — IPRATROPIUM BROMIDE AND ALBUTEROL SULFATE 3 ML: .5; 3 SOLUTION RESPIRATORY (INHALATION) at 01:04

## 2019-04-22 RX ADMIN — CLOPIDOGREL 75 MG: 75 TABLET, FILM COATED ORAL at 07:55

## 2019-04-22 RX ADMIN — LORAZEPAM 1 MG: 2 INJECTION INTRAMUSCULAR; INTRAVENOUS at 08:12

## 2019-04-22 RX ADMIN — LORAZEPAM 1 MG: 2 INJECTION INTRAMUSCULAR; INTRAVENOUS at 11:34

## 2019-04-22 RX ADMIN — BUPRENORPHINE AND NALOXONE 1 TABLET: 2; .5 TABLET SUBLINGUAL at 07:53

## 2019-04-22 RX ADMIN — MOMETASONE FUROATE AND FORMOTEROL FUMARATE DIHYDRATE 2 PUFF: 100; 5 AEROSOL RESPIRATORY (INHALATION) at 08:07

## 2019-04-22 RX ADMIN — FOLIC ACID 1 MG: 1 TABLET ORAL at 07:55

## 2019-04-22 RX ADMIN — CARVEDILOL 6.25 MG: 6.25 TABLET, FILM COATED ORAL at 09:44

## 2019-04-22 ASSESSMENT — PAIN SCALES - GENERAL: PAINLEVEL_OUTOF10: 9

## 2019-04-22 NOTE — PROGRESS NOTES
Patient informed early this morning that she would be taken down for MRI testing of her brain and hip. Patient verbally abusive and not wanting to go down because she doesn't like the MRI machine. 1mg of Ativan ordered for patient to have prior to going down. Transport arrived and patient still refused. Transport cancelled and left unit. Afterwards, patient agreed to go down and have the testing done.

## 2019-04-22 NOTE — PROGRESS NOTES
Smoking Cessation - topics covered   []  Health Risks  []  Benefits of Quitting   []  Smoking Cessation  []  Patient has no history of tobacco use  [x]  Patient is former smoker. Patient quit in 2018. [x]  No need for tobacco cessation education. []  Booklet given  []  Patient verbalizes understanding. []  Patient denies need for tobacco cessation education. []  Unable to meet with patient today. Will follow up as able.   Dane Ibarra  8:37 AM

## 2019-04-22 NOTE — PROGRESS NOTES
she just has hip arthritis & she will see a doctor herself; she wanted to go back home & that her sister was downstairs waiting to pick her up

## 2019-04-22 NOTE — PROGRESS NOTES
Patient informed that she needs to have Echo, EEG and MRI done. Patient refused all testing and stated that she \"is going home. \" Paged medicine to come and talk to her. Patient called sister to come and get her. Dr. Sandra Covarrubias came to speak with her and was told she wants to leave AMA. IVs removed, AMA paper signed. Patient left AMA. Left via wheelchair.

## 2019-04-22 NOTE — PROGRESS NOTES
Patient was brought to ULTRASOUND via transport. I asked the patient if she was willing to do the RENAL US. Patient kept saying the Doctor wanted her leg checked. I explained she was going to get a RENAL US and she said just take me back to where my clothes are at so I can leave. Patient was sent back to her room.

## 2019-04-22 NOTE — PLAN OF CARE
Problem: Falls - Risk of:  Goal: Will remain free from falls  Description  Will remain free from falls  Outcome: Ongoing  Goal: Absence of physical injury  Description  Absence of physical injury  Outcome: Ongoing     Problem: Nutrition  Goal: Optimal nutrition therapy  4/22/2019 0109 by Candance Pavlov, RN  Outcome: Ongoing  4/21/2019 1532 by Dat Munguia RD, LD  Outcome: Ongoing

## 2019-04-22 NOTE — CARE COORDINATION
Discharge [address] Case Management Department  Written by: Jim Reyes RN    Patient Name: Michelle Sorenson  Attending Provider: No att. providers found  Admit Date: 2019  1:34 PM  MRN: [id number]  Account: [de-identified]                     : 1955  Discharge Date: 2019      Disposition: patient left 810 Gladys Oquendo RN

## 2019-04-22 NOTE — PROGRESS NOTES
Patient on MRI table for a short period of time and unable to hold still for Brain exam. Patient moving and yelling are we done yet. Patient refused any further scanning. Sent patient back to room via transport. RN notified.

## 2019-04-22 NOTE — PROGRESS NOTES
[address] [place] [address]     Department of Internal Medicine - Staff Internal Medicine Service     DAILY PROGRESS NOTE - TEAM       Patient:  Aly Higgins  YOB: 1955  MRN: [id number]     Acct: [de-identified]     Admit date: 4/20/2019  Admitting Diagnosis: Metabolic encephalopathy likely due to ischemic stroke    Subjective:   Pt seen and Chart reviewed. VS stable, Afebrile  Patient's mentation is better. Blood pressure managed with blood pressure medication. Acute kidney injury resolved. Patient had good urinary output of 2750 mL overnight. Refusing IV fluids this a.m. Patient tolerating oral well. We will discontinue IV fluids. Patient to get an MRI of the right hip as her pain is persisting. Orthopedics is on board. Echo pending. EEG pending. REVIEW OF SYSTEMS:  · Constitutional: Negative for Fever, chills  · Eyes: Negative for visual changes, diplopia  · ENT: Negative for mouth sores, sore throat. · Cardiovascular: Negative for lightheadedness ,chest pain, palpitations   · Respiratory:Negative for Shortness of breath,cough or wheezing. · Gastrointestinal: Negative for nausea/vomiting, change in bowel habits, abdominal pain  · Genitourinary:Negative for change in bladder habits, dysuria, hematuria. · Musculoskeletal: Pain in the right hip  · Neurological: Negative for headache, change in muscle strength numbness/tingling  Objective:   BP (!) 155/96   Pulse 102   Temp 98 °F (36.7 °C) (Oral)   Resp 18   Ht 5' (1.524 m)   Wt 111 lb 5.3 oz (50.5 kg)   LMP 03/18/2015   SpO2 97%   BMI 21.74 kg/m²       Intake/Output Summary (Last 24 hours) at 4/22/2019 0826  Last data filed at 4/22/2019 0543  Gross per 24 hour   Intake 4167 ml   Output 3600 ml   Net 567 ml         Physical Exam   Constitutional: She is oriented to person, place, and time. She appears well-developed and well-nourished. She appears distressed. HENT:   Head: Normocephalic.    Slight facial droop on the left side, expressive aphasia  which is improving. Eyes: Pupils are equal, round, and reactive to light. Neck: Normal range of motion. No thyromegaly present. Cardiovascular: Normal rate, regular rhythm, normal heart sounds and intact distal pulses. Exam reveals no gallop and no friction rub. No murmur heard. Pulmonary/Chest: Effort normal and breath sounds normal. No respiratory distress. She has no wheezes. Abdominal: Soft. Bowel sounds are normal. She exhibits no distension. There is no tenderness. There is no rebound. No hernia. Musculoskeletal: Normal range of motion. She exhibits no edema. Weakness bilaterally in the lower extremities , improving . pain in the right hip. Restricted range of motion in the right hip. Lymphadenopathy:     She has no cervical adenopathy. Neurological: She is alert and oriented to person, place, and time. Skin: Skin is warm. Capillary refill takes less than 2 seconds. She is not diaphoretic. Psychiatric: She has a normal mood and affect. Her behavior is normal. Judgment and thought content normal.     Medications:   MEDICATIONS:  Prior to Admission medications    Medication Sig Start Date End Date Taking?  Authorizing Provider   ipratropium-albuterol (DUONEB) 0.5-2.5 (3) MG/3ML SOLN nebulizer solution Inhale 3 mLs into the lungs every 4 hours as needed for Shortness of Breath 1/11/19  Yes Gil Good MD   albuterol sulfate HFA (PROVENTIL HFA) 108 (90 Base) MCG/ACT inhaler Inhale 1-2 puffs into the lungs every 4 hours as needed for Wheezing or Shortness of Breath (Space out to every 6 hours as symptoms improve) 12/5/18  Yes Corry Choi MD   acetaminophen (APAP EXTRA STRENGTH) 500 MG tablet Take 2 tablets by mouth every 8 hours as needed for Pain 8/12/18  Yes Librado Osborne MD   benzonatate (TESSALON PERLES) 100 MG capsule Take 1 capsule by mouth 3 times daily as needed for Cough 3/11/19   Angel Lopez DO   carvedilol (COREG) 6.25 MG tablet Take 1 tablet ischemic stroke. Etiology unknown as of now. Left-sided facial droop and expressive aphasia. Improving . Bilateral upper extremity strength normal.   CT unremarkable. Neurology on board . EEG pending. Echo pending. Continue Plavix 75 mg daily. No aspirin as the patient has aspirin allergy. Folate 1 mg twice a day. Lipitor 40. working with  PT OT. Patient refusing iv fluids this am but tolerating oral well. Neuro checks per protocol.     2. CRYSTAL. Resolved. Follow with CBC and BMP daily. 3.  Arthralgia right hip. Etiology unknown as of now. Status post x-ray hip right side yesterday. No acute traumatic displaced fracture. Remote trauma versus remote infection versus AVN with moderate right hip osteoarthritis. Orthopedics consulted. Weightbearing as tolerated. Continue pain control. MRI today. If MRI negative for acute fracture okay to discharge from orthopedics perspective and follow up with Dr. Otis Alonso in 7-10 days.      3. Concern for multiple myeloma. Multiple small lucent lesions noted throughout cervical spine on imaging. Could be due to or osseous metastasis/multiple myeloma/ severe osteopenia. Pending results for SPEP, UPEP. CBC and BMP daily.     4.  Elevated troponin. due to demand ischemia. EKG unremarkable.     5. History of COPD. Continue breathing treatment. DuoNeb. Dulera. RT eval and treat. RT aerosol protocol.       6. History of polysubstance abuse. drug screen negative. Counseling provided.     7. History of nicotine dependence. Smoking cessation counseling.     8. Thyroid nodules. Heterogeneously enlarged thyroid gland with multiple thyroid nodules noted on CT neck. TSH 0.22.      9. History of hypertension. Patient on Coreg 6.25 twice a day at home. Will adjust medication as needed   BP checks per protocol.     10. History of hepatitis C.    11.  Hypokalemia. Resolved. 12.  Lactic acidosis.   Resolved      DVT prophylaxis: heparin  'Diet: general  PT/OT evaluation  Disposition: med-surg/ Step down    Melissa Luis M.D. Internal Medicine Resident , PGY-1  [address] [facility]  04/22/19 8:26 AM    Attending Physician Statement  I have discussed the care of Emi Daniels, including pertinent history and exam findings with the resident. I have reviewed the key elements of all parts of the encounter with the resident. I have seen and examined the patient with the resident and the key elements of all parts of the encounter have been performed by me.         Kaila Yu MD  Attending and Faculty Internal Medicine  [facility]  Internal Medicine [address] [place] [address], [facility]   4/22/19

## 2019-04-22 NOTE — PLAN OF CARE
Sobia Ventura, PPatient Assessment complete. Acute kidney injury ([address] [N17.9] . Vitals:    04/22/19 0800   BP: (!) 173/88   Pulse: 100   Resp: 20   Temp: 98.3 °F (36.8 °C)   SpO2: 97%   . Patients home meds are   Prior to Admission medications    Medication Sig Start Date End Date Taking?  Authorizing Provider   ipratropium-albuterol (DUONEB) 0.5-2.5 (3) MG/3ML SOLN nebulizer solution Inhale 3 mLs into the lungs every 4 hours as needed for Shortness of Breath 1/11/19  Yes Kaushal Carrizales MD   albuterol sulfate HFA (PROVENTIL HFA) 108 (90 Base) MCG/ACT inhaler Inhale 1-2 puffs into the lungs every 4 hours as needed for Wheezing or Shortness of Breath (Space out to every 6 hours as symptoms improve) 12/5/18  Yes Guanaco Cunningham MD   acetaminophen (APAP EXTRA STRENGTH) 500 MG tablet Take 2 tablets by mouth every 8 hours as needed for Pain 8/12/18  Yes Jazlyn Yun MD   benzonatate (TESSALON PERLES) 100 MG capsule Take 1 capsule by mouth 3 times daily as needed for Cough 3/11/19   Chuck Lopez DO   carvedilol (COREG) 6.25 MG tablet Take 1 tablet by mouth 2 times daily (with meals) 2/7/19   Pooja Virgen MD   ferrous sulfate (MELISSA-HAO) 325 (65 Fe) MG tablet Take 1 tablet by mouth daily 2/7/19   Pooja Virgen MD   ondansetron (ZOFRAN ODT) 4 MG disintegrating tablet Take 1 tablet by mouth every 8 hours as needed for Nausea or Vomiting 1/11/19   Kaushal Carrizales MD   melatonin 1 MG tablet Take 1 tablet by mouth nightly as needed for Sleep 1/11/19   Kaushal Carrizales MD   benzocaine-menthol (CEPACOL SORE THROAT) 15-3.6 MG lozenge Take 1 lozenge by mouth every 2 hours as needed for Sore Throat 1/11/19   Kaushal Carrizales MD   mometasone-formoterol [facility]) 100-5 MCG/ACT inhaler Inhale 2 puffs into the lungs 2 times daily 12/5/18   Guanaco Cunningham MD   pantoprazole (PROTONIX) 40 MG tablet Take 1 tablet by mouth 2 times daily (before meals) 11/13/18   Tito Nation MD   folic acid (FOLVITE) 1 MG tablet Take 1 tablet by mouth daily 10/13/18   Bethany Cade MD   vitamin B-1 100 MG tablet Take 1 tablet by mouth daily 10/13/18   Bethany Cade MD   vitamin B-12 250 MCG tablet Take 1 tablet by mouth daily 10/13/18   Bethany Cade MD   docusate sodium (COLACE) 100 MG capsule Take 1 capsule by mouth 2 times daily 10/12/18   Bethany Cade MD   gabapentin (NEURONTIN) 300 MG capsule Take 300 mg by mouth 3 times daily. Eunice Oseguera Historical Provider, MD   buprenorphine-naloxone (SUBOXONE) 12-3 MG sublingual film Place 1 Film under the tongue daily. Eunice Oseguera     Historical Provider, MD   guaiFENesin (MUCINEX) 600 MG extended release tablet Take 1 tablet by mouth 2 times daily 9/13/18   Annita Plata MD   Multiple Vitamins-Minerals (THERAPEUTIC MULTIVITAMIN-MINERALS) tablet Take 1 tablet by mouth daily 8/12/18   Howard Hawley MD       RR 16  Breath Sounds: Clear      · Bronchodilator assessment at level  0 Home meds  · Hyperinflation assessment at level   · Secretion Management assessment at level    ·   · [x]    Bronchodilator Assessment  BRONCHODILATOR ASSESSMENT SCORE  Score 0 1 2 3 4 5   Breath Sounds   [x]  Patient Baseline [x]  No Wheeze good aeration []  Faint, scattered wheezing, good aeration []  Expiratory Wheezing and or moderately diminished []  Insp/Exp wheeze and/or very diminished []  Insp/Exp and/ or marked distress   Respiratory Rate   [x]  Patient Baseline [x]  Less than 20 []  Less than 20 []  20-25 []  Greater than 25 []  Greater than 25   Peak flow % of Pred or PB [x]  NA   []  Greater than 90%  []  81-90% []  71-80% []  Less than or equal to 70%  or unable to perform []  Unable due to Respiratory Distress   Dyspnea re [x]  Patient Baseline [x]  No SOB []  No SOB []  SOB on exertion []  SOB min activity []  At rest/acute   e FEV% Predicted       [x]  NA []  Above 69%  []  Unable []  Above 60-69%  []  Unable []  Above 50-59%  []  Unable []  Above 35-49%  []  Unable []  Less than 35%  []  Unable                 [] Hyperinflation Assessment  Score 1 2 3   CXR and Breath Sounds   []  Clear []  No atelectasis  Basilar aeration []  Atelectasis or absent basilar breath sounds   Incentive Spirometry Volume  (Per IBW)   []  Greater than or equal to 15ml/Kg []  less than 15ml/Kg []  less than 15ml/Kg   Surgery within last 2 weeks []  None or general   []  Abdominal or thoracic surgery  []  Abdominal or thoracic   Chronic Pulmonary Historyre []  No []  Yes []  Yes     []  Secretion Management Assessment  Score 1 2 3   Bilateral Breath Sounds   []  Occasional Rhonchi []  Scattered Rhonchi []  Course Rhonchi and/or poor aeration   Sputum    []  Small amount of thin secretions []  Moderate amount of viscous secretions []  Copius, Viscious Yellow/ Secretions   CXR as reported by physician []  clear  []  Unavailable []  Infiltrates and/or consolidation  []  Unavailable []  Mucus Plugging and or lobar consolidation  []  Unavailable   Cough []  Strong, productive cough []  Weak productive cough []  No cough or weak non-productive cough   Bradley Washington A Hernandez  10:31 AM                            FEMALE                                  MALE                            FEV1 Predicted Normal Values                        FEV1 Predicted Normal Values          Age                                     Height in Feet and Inches       Age                                     Height in Feet and Inches       4' 11\" 5' 1\" 5' 3\" 5' 5\" 5' 7\" 5' 9\" 5' 11\" 6' 1\"  4' 11\" 5' 1\" 5' 3\" 5' 5\" 5' 7\" 5' 9\" 5' 11\" 6' 1\"   42 - 45 2.49 2.66 2.84 3.03 3.22 3.42 3.62 3.83 42 - 45 2.82 3.03 3.26 3.49 3.72 3.96 4.22 4.47   46 - 49 2.40 2.57 2.76 2.94 3.14 3.33 3.54 3.75 46 - 49 2.70 2.92 3.14 3.37 3.61 3.85 4.10 4.36   50 - 53 2.31 2.48 2.66 2.85 3.04 3.24 3.45 3.66 50 - 53 2.58 2.80 3.02 3.25 3.49 3.73 3.98 4.24   54 - 57 2.21 2.38 2.57 2.75 2.95 3.14 3.35 3.56 54 - 57 2.46 2.67 2.89 3.12 3.36 3.60 3.85 4.11   58 - 61 2.10 2.28 2.46 2.65 2.84 3.04 3.24 3.45 58 - 61 2.32 2.54 2. 76 2.99 3.23 3.47 3.72 3.98   62 - 65 1.99 2.17 2.35 2.54 2.73 2.93 3.13 3.34 62 - 65 2.19 2.40 2.62 2.85 3.09 3.33 3.58 3.84   66 - 69 1.88 2.05 2.23 2.42 2.61 2.81 3.02 3.23 66 - 69 2.04 2.26 2.48 2.71 2.95 3.19 3.44 3.70   70+ 1.82 1.99 2.17 2.36 2.55 2.75 2.95 3.16 70+ 1.97 2.19 2.41 2.64 2.87 3.12 3.37 3.62             Predicted Peak Expiratory Flow Rate                                       Height (in)  Female       Height (in) Male           Age 64 62 64 63 57 71 78 74 Age            21 344 357 372 387 402 417 432 446  60 62 64 66 68 70 72 74 76   25 337 352 366 381 396 411 426 441 25 447 476 505 533 562 591 619 648 677   30 329 344 359 374 389 404 419 434 30 437 466 494 523 552 580 609 638 667   35 322 337 351 366 381 396 411 426 35 426 455 484 512 541 570 598 627 657   40 314 329 344 359 374 389 404 419 40 416 445 473 502 531 559 588 617 647   45 307 322 336 351 366 381 396 411 45 405 434 463 491 520 549 577 606 636   50 299 314 329 344 359 374 389 404 50 395 424 452 481 510 538 567 596 625   55 292 307 321 336 351 366 381 396 55 384 413 442 470 499 528 556 585 615   60 284 299 314 329 344 359 374 389 60 374 403 431 460 489 517 546 575 605   65 277 292 306 321 336 351 366 381 65 363 392 421 449 478 507 535 564 594   70 269 284 299 314 329 344 359 374 70 353 382 410 439 468 496 525 554 583   75 261 274 289 305 319 334 348 364 75 344 372 400 429 458 487 515 544 573   80 253 266 282 296 312 327 342 356 80 335 362 390 419 448 [phone] 562

## 2019-04-23 LAB
ALBUMIN (CALCULATED): 3.3 G/DL (ref 3.2–5.2)
ALBUMIN PERCENT: 53 % (ref 45–65)
ALPHA 1 PERCENT: 4 % (ref 3–6)
ALPHA 2 PERCENT: 13 % (ref 6–13)
ALPHA-1-GLOBULIN: 0.2 G/DL (ref 0.1–0.4)
ALPHA-2-GLOBULIN: 0.8 G/DL (ref 0.5–0.9)
BETA GLOBULIN: 0.7 G/DL (ref 0.5–1.1)
BETA PERCENT: 11 % (ref 11–19)
GAMMA GLOBULIN %: 20 % (ref 9–20)
GAMMA GLOBULIN: 1.3 G/DL (ref 0.5–1.5)
P E INTERPRETATION, U: NORMAL
PATHOLOGIST: ABNORMAL
PATHOLOGIST: NORMAL
PROTEIN ELECTROPHORESIS, SERUM: ABNORMAL
SPECIMEN TYPE: NORMAL
TOTAL PROT. SUM,%: 101 % (ref 98–102)
TOTAL PROT. SUM: 6.3 G/DL (ref 6.3–8.2)
TOTAL PROTEIN: 6.2 G/DL (ref 6.4–8.3)
URINE TOTAL PROTEIN: 30 MG/DL

## 2019-04-24 NOTE — DISCHARGE SUMMARY
[address] [place] [address]     Department of Internal Medicine - Staff Internal Medicine Service    INPATIENT DISCHARGE SUMMARY      PATIENT IDENTIFICATION:  NAME:  Julito Daniels   :   1955  MRN:    [id number]     Acct:    [de-identified]   Admit Date:  2019  Discharge date:  2019  Attending Provider: Dr. Minesh Torres:   Howard Pineda is a 75 y.o. female who presented with   Chief Complaint   Patient presents with    Altered Mental Status         DIAGNOSES:  Primary:   Acute kidney injury ([facility] [N17.9]    Secondary: Active Hospital Problems    Diagnosis Date Noted    Acute kidney injury ([facility] [N17.9] 2019    Cerebrovascular accident (CVA) ([facility] [I63.9] 2019    Stroke-like symptoms [R29.90]     COPD (chronic obstructive pulmonary disease) ([facility] [J44.9] 2018    Hepatitis C [B19.20] 10/11/2018    History of substance abuse [Z87.898] 10/11/2018       TREATMENT:  Brief Inpatient Course: Patient presented with left sided facial droop and expressive aphasia. Concern for ischemic stroke. Neurology consulted. Patient got MRI  brain done which was unremarkable for any new changes. Lower extremity strength decreased as compared to upper. CT unremarkable. Had aspirin allergy. Given Plavix. Had CRYSTAL secondary to dehydration. Treated with fluids. There was concern for Multiple myeloma as there were multiple small lucent lesions noted throughout cervical spine on imaging. SPEP and UPEP were ordered. Patient complained of hip pain and hip X-ray was ordered which showed changes either for remote infarction or AVN. MRI was suggested further but patient declined. Patient also declined ECHO to rule out any cardioembolic source of stroke. Patient was not interested in getting treated further. Patient did not want to stay further for work up of Multiple myeloma also. Patient left AMA.         Consults:   neurology and tablet, R-3Normal      vitamin B-1 100 MG tablet Take 1 tablet by mouth daily, Disp-30 tablet, R-3Normal      vitamin B-12 250 MCG tablet Take 1 tablet by mouth daily, Disp-30 tablet, R-3Normal      docusate sodium (COLACE) 100 MG capsule Take 1 capsule by mouth 2 times daily, Disp-60 capsule, R-2Normal      gabapentin (NEURONTIN) 300 MG capsule Take 300 mg by mouth 3 times daily. Jonathan Fox Historical Med      buprenorphine-naloxone (SUBOXONE) 12-3 MG sublingual film Place 1 Film under the tongue daily. Jonathan Fox Historical Med      guaiFENesin (MUCINEX) 600 MG extended release tablet Take 1 tablet by mouth 2 times daily, Disp-20 tablet, R-0Normal      Multiple Vitamins-Minerals (THERAPEUTIC MULTIVITAMIN-MINERALS) tablet Take 1 tablet by mouth daily, Disp-30 tablet, R-3Normal           Activity: activity as tolerated    Diet: regular diet encourage liquids. Disposition: left AMA    Follow-up:  in the next few days with Fatmata Velazquez  [address] [place] [id number]  [phone]            Follow up labs: as per PCP    Follow up imaging: as per PCP    Time Spent on discharge is more than 45 minutes in the examination, evaluation, counseling and review of medications and discharge plan. Jos Barger M.D.   Internal Medicine Resident , PGY-1  [facility]

## 2019-05-11 ENCOUNTER — APPOINTMENT (OUTPATIENT)
Dept: GENERAL RADIOLOGY | Age: 64
End: 2019-05-11
Payer: MEDICARE

## 2019-05-11 ENCOUNTER — HOSPITAL ENCOUNTER (OUTPATIENT)
Age: 64
Setting detail: OBSERVATION
Discharge: HOME | End: 2019-05-12
Attending: EMERGENCY MEDICINE | Admitting: EMERGENCY MEDICINE
Payer: MEDICARE

## 2019-05-11 DIAGNOSIS — R07.9 CHEST PAIN, UNSPECIFIED TYPE: Primary | ICD-10-CM

## 2019-05-11 DIAGNOSIS — R05.9 COUGH: ICD-10-CM

## 2019-05-11 PROCEDURE — 93005 ELECTROCARDIOGRAM TRACING: CPT

## 2019-05-11 PROCEDURE — 99285 EMERGENCY DEPT VISIT HI MDM: CPT

## 2019-05-11 PROCEDURE — 71046 X-RAY EXAM CHEST 2 VIEWS: CPT

## 2019-05-11 RX ORDER — DIPHENHYDRAMINE HCL 25 MG
25 TABLET ORAL ONCE
Status: COMPLETED | OUTPATIENT
Start: 2019-05-11 | End: 2019-05-12

## 2019-05-11 RX ORDER — BENZONATATE 100 MG/1
100 CAPSULE ORAL ONCE
Status: COMPLETED | OUTPATIENT
Start: 2019-05-11 | End: 2019-05-12

## 2019-05-11 RX ORDER — 0.9 % SODIUM CHLORIDE 0.9 %
1000 INTRAVENOUS SOLUTION INTRAVENOUS ONCE
Status: COMPLETED | OUTPATIENT
Start: 2019-05-11 | End: 2019-05-12

## 2019-05-11 RX ORDER — PROCHLORPERAZINE EDISYLATE 5 MG/ML
10 INJECTION INTRAMUSCULAR; INTRAVENOUS ONCE
Status: COMPLETED | OUTPATIENT
Start: 2019-05-11 | End: 2019-05-12

## 2019-05-12 ENCOUNTER — APPOINTMENT (OUTPATIENT)
Dept: GENERAL RADIOLOGY | Age: 64
End: 2019-05-12
Payer: MEDICARE

## 2019-05-12 VITALS
OXYGEN SATURATION: 96 % | RESPIRATION RATE: 17 BRPM | HEART RATE: 70 BPM | DIASTOLIC BLOOD PRESSURE: 87 MMHG | SYSTOLIC BLOOD PRESSURE: 126 MMHG | TEMPERATURE: 97.8 F | BODY MASS INDEX: 23.56 KG/M2 | HEIGHT: 60 IN | WEIGHT: 120 LBS

## 2019-05-12 PROBLEM — R07.9 CHEST PAIN: Status: ACTIVE | Noted: 2019-05-12

## 2019-05-12 LAB
-: NORMAL
ABSOLUTE EOS #: 0.09 K/UL (ref 0–0.4)
ABSOLUTE IMMATURE GRANULOCYTE: 0 K/UL (ref 0–0.3)
ABSOLUTE LYMPH #: 3 K/UL (ref 1–4.8)
ABSOLUTE MONO #: 0.55 K/UL (ref 0.1–0.8)
AMORPHOUS: NORMAL
ANION GAP SERPL CALCULATED.3IONS-SCNC: 11 MMOL/L (ref 9–17)
ANION GAP SERPL CALCULATED.3IONS-SCNC: 13 MMOL/L (ref 9–17)
BACTERIA: NORMAL
BASOPHILS # BLD: 0 % (ref 0–2)
BASOPHILS ABSOLUTE: 0 K/UL (ref 0–0.2)
BILIRUBIN URINE: ABNORMAL
BNP INTERPRETATION: NORMAL
BUN BLDV-MCNC: 12 MG/DL (ref 8–23)
BUN BLDV-MCNC: 9 MG/DL (ref 8–23)
BUN/CREAT BLD: ABNORMAL (ref 9–20)
BUN/CREAT BLD: ABNORMAL (ref 9–20)
CALCIUM SERPL-MCNC: 6.8 MG/DL (ref 8.6–10.4)
CALCIUM SERPL-MCNC: 9.9 MG/DL (ref 8.6–10.4)
CASTS UA: NORMAL /LPF (ref 0–8)
CHLORIDE BLD-SCNC: 102 MMOL/L (ref 98–107)
CHLORIDE BLD-SCNC: 113 MMOL/L (ref 98–107)
CO2: 17 MMOL/L (ref 20–31)
CO2: 22 MMOL/L (ref 20–31)
COLOR: ABNORMAL
COMMENT UA: ABNORMAL
CREAT SERPL-MCNC: 0.39 MG/DL (ref 0.5–0.9)
CREAT SERPL-MCNC: 0.54 MG/DL (ref 0.5–0.9)
CRYSTALS, UA: NORMAL /HPF
DIFFERENTIAL TYPE: ABNORMAL
EOSINOPHILS RELATIVE PERCENT: 1 % (ref 1–4)
EPITHELIAL CELLS UA: NORMAL /HPF (ref 0–5)
GFR AFRICAN AMERICAN: >60 ML/MIN
GFR AFRICAN AMERICAN: >60 ML/MIN
GFR NON-AFRICAN AMERICAN: >60 ML/MIN
GFR NON-AFRICAN AMERICAN: >60 ML/MIN
GFR SERPL CREATININE-BSD FRML MDRD: ABNORMAL ML/MIN/{1.73_M2}
GLUCOSE BLD-MCNC: 110 MG/DL (ref 70–99)
GLUCOSE BLD-MCNC: 83 MG/DL (ref 70–99)
GLUCOSE URINE: NEGATIVE
HCT VFR BLD CALC: 45.6 % (ref 36.3–47.1)
HEMOGLOBIN: 13.7 G/DL (ref 11.9–15.1)
IMMATURE GRANULOCYTES: 0 %
KETONES, URINE: ABNORMAL
LEUKOCYTE ESTERASE, URINE: ABNORMAL
LYMPHOCYTES # BLD: 33 % (ref 24–44)
MCH RBC QN AUTO: 27.3 PG (ref 25.2–33.5)
MCHC RBC AUTO-ENTMCNC: 30 G/DL (ref 28.4–34.8)
MCV RBC AUTO: 90.8 FL (ref 82.6–102.9)
MONOCYTES # BLD: 6 % (ref 1–7)
MORPHOLOGY: ABNORMAL
MUCUS: NORMAL
NITRITE, URINE: NEGATIVE
NRBC AUTOMATED: 0 PER 100 WBC
OTHER OBSERVATIONS UA: NORMAL
PDW BLD-RTO: 23.6 % (ref 11.8–14.4)
PH UA: 6 (ref 5–8)
PLATELET # BLD: 673 K/UL (ref 138–453)
PLATELET ESTIMATE: ABNORMAL
PMV BLD AUTO: 8.6 FL (ref 8.1–13.5)
POTASSIUM SERPL-SCNC: 3.3 MMOL/L (ref 3.7–5.3)
POTASSIUM SERPL-SCNC: 4.4 MMOL/L (ref 3.7–5.3)
PRO-BNP: 206 PG/ML
PROTEIN UA: NEGATIVE
RBC # BLD: 5.02 M/UL (ref 3.95–5.11)
RBC # BLD: ABNORMAL 10*6/UL
RBC UA: NORMAL /HPF (ref 0–4)
RENAL EPITHELIAL, UA: NORMAL /HPF
SEG NEUTROPHILS: 60 % (ref 36–66)
SEGMENTED NEUTROPHILS ABSOLUTE COUNT: 5.46 K/UL (ref 1.8–7.7)
SODIUM BLD-SCNC: 137 MMOL/L (ref 135–144)
SODIUM BLD-SCNC: 141 MMOL/L (ref 135–144)
SPECIFIC GRAVITY UA: 1.02 (ref 1–1.03)
TRICHOMONAS: NORMAL
TROPONIN INTERP: NORMAL
TROPONIN T: NORMAL NG/ML
TROPONIN, HIGH SENSITIVITY: 12 NG/L (ref 0–14)
TURBIDITY: CLEAR
URINE HGB: NEGATIVE
UROBILINOGEN, URINE: ABNORMAL
WBC # BLD: 9.1 K/UL (ref 3.5–11.3)
WBC # BLD: ABNORMAL 10*3/UL
WBC UA: NORMAL /HPF (ref 0–5)
YEAST: NORMAL

## 2019-05-12 PROCEDURE — 6360000002 HC RX W HCPCS: Performed by: EMERGENCY MEDICINE

## 2019-05-12 PROCEDURE — 81001 URINALYSIS AUTO W/SCOPE: CPT

## 2019-05-12 PROCEDURE — 85025 COMPLETE CBC W/AUTO DIFF WBC: CPT

## 2019-05-12 PROCEDURE — 84484 ASSAY OF TROPONIN QUANT: CPT

## 2019-05-12 PROCEDURE — G0378 HOSPITAL OBSERVATION PER HR: HCPCS

## 2019-05-12 PROCEDURE — 80048 BASIC METABOLIC PNL TOTAL CA: CPT

## 2019-05-12 PROCEDURE — 96372 THER/PROPH/DIAG INJ SC/IM: CPT

## 2019-05-12 PROCEDURE — 6370000000 HC RX 637 (ALT 250 FOR IP): Performed by: EMERGENCY MEDICINE

## 2019-05-12 PROCEDURE — 71045 X-RAY EXAM CHEST 1 VIEW: CPT

## 2019-05-12 PROCEDURE — 2580000003 HC RX 258: Performed by: EMERGENCY MEDICINE

## 2019-05-12 PROCEDURE — 83880 ASSAY OF NATRIURETIC PEPTIDE: CPT

## 2019-05-12 PROCEDURE — 87086 URINE CULTURE/COLONY COUNT: CPT

## 2019-05-12 RX ORDER — ACETAMINOPHEN 325 MG/1
650 TABLET ORAL EVERY 4 HOURS PRN
Status: DISCONTINUED | OUTPATIENT
Start: 2019-05-12 | End: 2019-05-12 | Stop reason: HOSPADM

## 2019-05-12 RX ORDER — CARVEDILOL 12.5 MG/1
6.25 TABLET ORAL ONCE
Status: COMPLETED | OUTPATIENT
Start: 2019-05-12 | End: 2019-05-12

## 2019-05-12 RX ORDER — AZITHROMYCIN 250 MG/1
TABLET, FILM COATED ORAL
Qty: 1 PACKET | Refills: 0 | Status: SHIPPED | OUTPATIENT
Start: 2019-05-12 | End: 2019-05-22

## 2019-05-12 RX ORDER — CARVEDILOL 6.25 MG/1
6.25 TABLET ORAL 2 TIMES DAILY
Status: DISCONTINUED | OUTPATIENT
Start: 2019-05-12 | End: 2019-05-12 | Stop reason: HOSPADM

## 2019-05-12 RX ORDER — SODIUM CHLORIDE 0.9 % (FLUSH) 0.9 %
10 SYRINGE (ML) INJECTION PRN
Status: DISCONTINUED | OUTPATIENT
Start: 2019-05-12 | End: 2019-05-12 | Stop reason: HOSPADM

## 2019-05-12 RX ORDER — SODIUM CHLORIDE 9 MG/ML
INJECTION, SOLUTION INTRAVENOUS CONTINUOUS
Status: DISCONTINUED | OUTPATIENT
Start: 2019-05-12 | End: 2019-05-12 | Stop reason: HOSPADM

## 2019-05-12 RX ORDER — SODIUM CHLORIDE 0.9 % (FLUSH) 0.9 %
10 SYRINGE (ML) INJECTION EVERY 12 HOURS SCHEDULED
Status: DISCONTINUED | OUTPATIENT
Start: 2019-05-12 | End: 2019-05-12 | Stop reason: HOSPADM

## 2019-05-12 RX ADMIN — CARVEDILOL 6.25 MG: 12.5 TABLET, FILM COATED ORAL at 01:01

## 2019-05-12 RX ADMIN — DIPHENHYDRAMINE HCL 25 MG: 25 TABLET ORAL at 01:01

## 2019-05-12 RX ADMIN — SODIUM CHLORIDE: 9 INJECTION, SOLUTION INTRAVENOUS at 03:28

## 2019-05-12 RX ADMIN — PROCHLORPERAZINE EDISYLATE 10 MG: 5 INJECTION INTRAMUSCULAR; INTRAVENOUS at 01:02

## 2019-05-12 RX ADMIN — SODIUM CHLORIDE 1000 ML: 9 INJECTION, SOLUTION INTRAVENOUS at 01:01

## 2019-05-12 RX ADMIN — ACETAMINOPHEN 650 MG: 325 TABLET ORAL at 10:27

## 2019-05-12 RX ADMIN — BENZONATATE 100 MG: 100 CAPSULE ORAL at 01:01

## 2019-05-12 ASSESSMENT — HEART SCORE: ECG: 1

## 2019-05-12 ASSESSMENT — ENCOUNTER SYMPTOMS
NAUSEA: 0
ABDOMINAL PAIN: 0
SHORTNESS OF BREATH: 1
COUGH: 1
VOMITING: 0
EYE PAIN: 0
DIARRHEA: 0
RHINORRHEA: 0
CONSTIPATION: 0

## 2019-05-12 ASSESSMENT — PAIN SCALES - GENERAL
PAINLEVEL_OUTOF10: 8
PAINLEVEL_OUTOF10: 0
PAINLEVEL_OUTOF10: 0

## 2019-05-12 NOTE — PROGRESS NOTES
[facility]  CDU / OBSERVATION eNCOUnter  Attending NOte       I performed a history and physical examination of the patient and discussed management with the resident. I reviewed the residents note and agree with the documented findings and plan of care. Any areas of disagreement are noted on the chart. I was personally present for the key portions of any procedures. I have documented in the chart those procedures where I was not present during the key portions. I have reviewed the nurses notes. I agree with the chief complaint, past medical history, past surgical history, allergies, medications, social and family history as documented unless otherwise noted below. The Family history, social history, and ROS are effectively unchanged since admission unless noted elsewhere in the chart. 53-year-old female admitted with chest pain. Also complains of a productive cough and shortness of breath and a migraine headache. Known COPD. Breath sounds with a few scattered ronchi on exam, no wheezing. Cardiac exam with a normal rate, regular rhythm. Troponin negative. Cardiology recommends no further cardiac workup. Discharge planning. Patient smokes 1-2 cigarettes per day for 30 years. Smoking cessation counseling for 3 minutes. Discussed the effects of smoking in regards to cardiac disease, lung disease. I explained how this negatively effects their condition, will contribute to increased recovery time, and possible lead to further health problems in the future.       Vale Tinsley MD  Attending Emergency  Physician

## 2019-05-12 NOTE — H&P
[facility]  CDU / OBSERVATION eNCOUnter  Resident Note     Pt Name: Renald Mcburney  MRN: [id number]  [place] 1955  Date of evaluation: 5/12/19  Patient's PCP is : No primary care provider on file. CHIEF COMPLAINT       Chief Complaint   Patient presents with    Shortness of Breath     pt c/o cough, CP, SOB, and frontal headache. HISTORY OF PRESENT ILLNESS    Emi Daniels is a 75 y.o. female who presents acute shortness of breath, productive cough, sharp midsternal chest pain without radiation, and frontal headache. He states that the symptoms have been going on for the past 5 days. He states that the chest pain worsens with cough. She denies fevers or chills. Patient has a history of COPD, smoker. Location/Symptom: midsternal chest pain, frontal headache  Timing/Onset: 5 days  Provocation: coughing  Quality: sharp  Radiation: none  Severity: mild to moderate  Timing/Duration: constant  Modifying Factors: coughing    REVIEW OF SYSTEMS       General ROS - No fevers, No malaise   Ophthalmic ROS - No discharge, No changes in vision  ENT ROS -  No sore throat, No rhinorrhea,   Respiratory ROS - shortness of breath, cough  Cardiovascular ROS - chest pain, no dyspnea on exertion  Gastrointestinal ROS - No abdominal pain, no nausea or vomiting, no change in bowel habits, no black or bloody stools  Genito-Urinary ROS - No dysuria, trouble voiding, or hematuria  Musculoskeletal ROS - No myalgias, No arthalgias  Neurological ROS - headache, no dizziness/lightheadedness, No focal weakness, no loss of sensation  Dermatological ROS - No lesions, No rash     (PQRS) Advance directives on face sheet per hospital policy.  No change unless specifically mentioned in chart    PAST MEDICAL HISTORY    has a past medical history of Appendicitis, CHF (congestive heart failure) ([address] Chronic respiratory failure with hypoxia and hypercapnia ([address] Chronic rhinitis, Cigarette smoker, COPD (chronic obstructive pulmonary disease) ([address] Depression, Dysphagia, Essential hypertension, GERD (gastroesophageal reflux disease), Heart failure, diastolic, with acute decompensation ([address] Hepatitis C, chronic ([address] History of smoking at least 1 pack per day for at least 30 years, Hyperlipidemia, Hypertension, Migraine, Moderate COPD (chronic obstructive pulmonary disease) ([address] Multiple transfusions, Osteoarthritis, Pneumonia, Scoliosis, and Substance abuse ([address] I have reviewed the past medical history with the patient and it is pertinent to this complaint. SURGICAL HISTORY      has a past surgical history that includes  section; LEEP (); Upper gastrointestinal endoscopy (); Gastric bypass surgery; Appendectomy (2017); laparoscopic appendectomy (N/A, 3/27/2017); Upper gastrointestinal endoscopy (2018); Upper gastrointestinal endoscopy (N/A, 2018); pr colonoscopy w/biopsy single/multiple (N/A, 11/10/2018); and pr egd transoral biopsy single/multiple (N/A, 2018). I have reviewed and agree with Surgical History entered and it is pertinent to this complaint. CURRENT MEDICATIONS       sodium chloride flush 0.9 % injection 10 mL 2 times per day   sodium chloride flush 0.9 % injection 10 mL PRN   acetaminophen (TYLENOL) tablet 650 mg Q4H PRN   0.9 % sodium chloride infusion Continuous   carvedilol (COREG) tablet 6.25 mg BID       All medication charted and reviewed. ALLERGIES     is allergic to aspirin; keflet [cephalexin]; and motrin [ibuprofen]. FAMILY HISTORY     has no family status information on file. family history is not on file. The patient denies any pertinent family history. I have reviewed and agree with the family history entered. I have reviewed the Family History and it is not significant to the case    SOCIAL HISTORY      reports that she quit smoking about 6 months ago. Her smoking use included cigarettes.  She started smoking about 49 years ago. She has a 40.00 pack-year smoking history. She has never used smokeless tobacco. She reports that she does not drink alcohol or use drugs. I have reviewed and agree with all Social.  There are concerns for substance abuse/use. PHYSICAL EXAM     INITIAL VITALS:  height is 5' (1.524 m) and weight is 120 lb (54.4 kg). Her oral temperature is 97.8 °F (36.6 °C). Her blood pressure is 126/87 and her pulse is 70. Her respiration is 17 and oxygen saturation is 96%. CONSTITUTIONAL: AOx4, no apparent distress, appears stated age    HEAD: normocephalic, atraumatic   EYES: PERRLA, EOMI    ENT: moist mucous membranes, uvula midline   NECK: supple, symmetric   BACK: symmetric   LUNGS: Auscultation reveals scattered rhonchi with exam, no wheeze.   Good air movement bilaterally    CARDIOVASCULAR: regular rate and rhythm, no murmurs, rubs or gallops   ABDOMEN: soft, non-tender, non-distended with normal active bowel sounds   NEUROLOGIC:  MAEx4, no focal sensory or motor deficits   MUSCULOSKELETAL: no clubbing, cyanosis or edema   SKIN: no rash or wounds       DIFFERENTIAL DIAGNOSIS/MDM:   Headache:  DDX: SAH, subdural hematoma, epidural, intracerebral, meningitis, encephalitis, migraine, tension, cluster, sinusitis, dental, stroke, encephalitis, abscess, CNS mass, increased ICP, venous thrombosis, carbon monoxide poisoning, acute angle closure glaucoma, temporal arteritis, idiopathic intracranial hypertension    Cough:  DDX: pneumonia, sinusitis, foreign body, URI, viral illness, asthma/ COPD, allergic rhinitis, GERD, ACE- inhibitor use, HIV (TB, PCP)    Chest Pain:  DDX: Emergent: ACS/NSTEMI/STEMI/angina, arrhythmia, trauma, aortic dissection,  PE, PNA, pneumothroax, esophageal rupture, tamponade, Cocaine use  Nonemergent: pneumonia, pericarditis, GERD, MSK, Endocarditis, anxiety  Evaluated for: diaphoresis, present chest pain, tachypnea, BP both arms, heart sounds, JVD, tender chest wall, wheezing      DIAGNOSTIC RESULTS     EKG: All EKG's are interpreted by the Observation Physician who either signs or Co-signs this chart in the absence of a cardiologist.    EKG Interpretation    Interpreted by me    Rhythm: normal sinus   Rate: mild tachycardia  Axis: normal  Ectopy: none  Conduction: PRWP, suspect lead placement  ST Segments: no acute change  T Waves: nonspecific changes  Q Waves: none    Clinical Impression: nonspecifric EKG    Verdell Mohs, DO        RADIOLOGY:   I directly visualized the following  images and reviewed the radiologist interpretations:    Xr Chest Standard (2 Vw)    Result Date: 5/12/2019  EXAMINATION: TWO XRAY VIEWS OF THE CHEST 5/12/2019 12:39 am COMPARISON: 04/20/2019 HISTORY: ORDERING SYSTEM PROVIDED HISTORY: cough TECHNOLOGIST PROVIDED HISTORY: cough Ordering Physician Provided Reason for Exam: cough Acuity: Unknown Type of Exam: Unknown FINDINGS: The cardiac silhouette is within normal limits for size. The pulmonary vasculature is within normal limits. There is no focal consolidation, pleural effusion or pneumothorax. The visualized osseous structures demonstrate no acute abnormality. No acute cardiopulmonary abnormality. Xr Chest Portable    Result Date: 5/12/2019  EXAMINATION: ONE XRAY VIEW OF THE CHEST 5/12/2019 10:23 am COMPARISON: May 11, 2019, April 20, 2018 HISTORY: ORDERING SYSTEM PROVIDED HISTORY: cough TECHNOLOGIST PROVIDED HISTORY: cough Ordering Physician Provided Reason for Exam: COUGH Acuity: Unknown Type of Exam: Unknown FINDINGS: The cardiomediastinal silhouette is unchanged in appearance. There is no consolidation, pneumothorax, or evidence of edema. No effusion is appreciated. The osseous structures are unchanged in appearance. IV site in the mid left upper arm. Unchanged appearance of the chest without acute airspace disease identified. LABS:  I have reviewed and interpreted all available lab results.   Labs Reviewed   CBC WITH AUTO DIFFERENTIAL - Abnormal; Notable for the following components:       Result Value    RDW 23.6 (*)     Platelets 056 (*)     All other components within normal limits   BASIC METABOLIC PANEL - Abnormal; Notable for the following components:    Glucose 110 (*)     All other components within normal limits   URINE RT REFLEX TO CULTURE - Abnormal; Notable for the following components:    Color, UA DARK YELLOW (*)     Bilirubin Urine NEGATIVE  Verified by ictotest. (*)     Ketones, Urine TRACE (*)     Urobilinogen, Urine ELEVATED (*)     Leukocyte Esterase, Urine TRACE (*)     All other components within normal limits   BASIC METABOLIC PANEL - Abnormal; Notable for the following components:    CREATININE 0.39 (*)     Calcium 6.8 (*)     Potassium 3.3 (*)     Chloride 113 (*)     CO2 17 (*)     All other components within normal limits   URINE CULTURE CLEAN CATCH   TROPONIN   BRAIN NATRIURETIC PEPTIDE   MICROSCOPIC URINALYSIS   URINE RT REFLEX TO CULTURE       SCREENING TOOLS:    HEART Risk Score for Chest Pain Patients   History and Physical Exam Suspicion Level  (Nausea, Vomiting, Diaphoresis, Radiation, Exertion)   Slightly Suspicious (0 pts)   Moderately Suspicious (1 pt)   Highly Suspicious (2 pts)   EKG Interpretation   Normal (0 pts)   Non-Specific Repolarization Disturbance (1 pt)   Significant ST-Depression (2 pts)   Age of Patient (in years)   = 39 (0 pts)   46-64 (1 pt)   = 65 (2 pts)   Risk Factors   No Risk Factors (0 pts)   1-2 Risk Factors (1 pt)   = 3 Risk Factors (2 pts)   Risk Factors Include:   Hypercholesterolemia   Hypertension   Diabetes Mellitus   Cigarette smoking   Positive family history   Obesity   CAD   (SLE, CKDz, HIV, Cocaine abuse)   Troponin Levels   = Normal Limit (0 pts)   1-3 Times Normal Limit (1 pt)   > 3 Times Normal Limit (2 pts)  TOTAL:5    Percent Risk for Major Adverse Cardiac Event (MACE)  0-3 pts indicates low risk for MACE   2.5% (DISCHARGE)   4-7 pts indicates moderate risk for MACE  20.3% (OBS)  8-10 pts indicates high risk for MACE  72.7% (EARLY INVASIVE [place])    CDU IMPRESSION / PLAN      Emi Daniels is a 75 y.o. female who presents with acute midsternal nonradiating chest pain, nonexertional shortness of breath, productive cough, and a dull headache ×5 days. Constellation of symptoms related to bronchitis    Acute on chronic frontal migraine. Very low suspicion for subarachnoid hemorrhages patient states this is her typical migraine and this is not the worst headache of her life. No focal neurological deficits    · ACS workup negative for ACS in the ED. Initially in the ED, considered pulmonary embolism is a diagnosis however low suspicion, the treating tachycardia at that time to physical signs of dehydration. After patient received IV fluids, pulse normalized. Patient was never tachypneic and never required oxygen supplementation to maintain appropriate oxygenation. On my exam this morning, scattered rhonchi continue to be present, obtained repeat chest x-ray after administration of IV fluids to evaluate for the possibility of a pneumonia that was not visualized on initial CXR secondary to dehydration. Repeat chest x-ray stable not showing infiltrate. Clinically, patient presenting with bronchitis. Will prescribe Z-Brandon at discharge, given co morbidities including COPD. · Cardiology consultation given mildly elevated heart score. Recent echocardiogram in January showing EF 60-65%. After cardiology consultation, it was determined by cardiology team that she does not need further cardiology workup at this time. · Continued home medications and pain control  · Monitor vitals, labs, and imaging  · DISPO: DC toda    CONSULTS:    IP CONSULT TO CARDIOLOGY    PROCEDURES:  Not indicated       PATIENT REFERRED TO:    No follow-up provider specified. --  Chitra Marquez DO   Emergency Medicine Resident     This dictation was generated by voice recognition computer software.

## 2019-05-12 NOTE — DISCHARGE INSTR - COC
Continuity of Care Form    Patient Name: Trav Gutierrez   :  1955  MRN:  [id number]    Admit date:  2019  Discharge date:  [id number]    Code Status Order: Full Code   Advance Directives:   [facility] Documentation     Date/Time Healthcare Directive Type of Healthcare Directive Copy in [facility] [address] Agent's Name Healthcare Agent's Phone Number    19 0320  No, patient does not have an advance directive for healthcare treatment -- -- -- -- --          Admitting Physician:  Beatrice Brown MD  PCP: No primary care provider on file. Discharging Nurse: [facility] Unit/Room#: 4105/6570-16  Discharging Unit Phone Number: [phone]    Emergency Contact:   Extended Emergency Contact Information  Primary Emergency Contact: [address] Phone: [phone]  Mobile Phone: [phone]  Relation: Brother/Sister    Past Surgical History:  Past Surgical History:   Procedure Laterality Date    APPENDECTOMY  2017     SECTION      GASTRIC BYPASS SURGERY      LAPAROSCOPIC APPENDECTOMY N/A 3/27/2017    APPENDECTOMY LAPAROSCOPIC performed by Tiffany Whitfield MD at [address] [place] [address]      hgsil    MO COLONOSCOPY W/BIOPSY SINGLE/MULTIPLE N/A 11/10/2018    COLONOSCOPY WITH BIOPSY performed by Jimmy Kidd MD at [facility]    MO EGD TRANSORAL BIOPSY SINGLE/MULTIPLE N/A 2018    EGD ESOPHAGOGASTRODUODENOSCOPY performed by Amandeep Valente MD at [address] [place] [address]      oesophagitis, candidiasis    UPPER GASTROINTESTINAL ENDOSCOPY  2018    UPPER GASTROINTESTINAL ENDOSCOPY N/A 2018    EGD CONTROL HEMORRHAGE performed by Amandeep Valente MD at [address] [place] [address] History: There is no immunization history for the selected administration types on file for this patient.     Active Problems:  Patient Active Problem List   Diagnosis Code    Hypertension I10    Hepatitis C B19.20    History of substance abuse Z87.898    Chronic heart failure with reduced ejection fraction and diastolic dysfunction (HCC) I50.42    COPD (chronic obstructive pulmonary disease) (HCC) W50.9    Alcoholic cirrhosis of liver without ascites (HCC) K70.30    Acute GI hemorrhage K92.2    PUD (peptic ulcer disease) K27.9    Anemia due to gastrointestinal blood loss D50.0    Duodenal ulcer with hemorrhage K26.4    Psychosis, intermittent requring antipsychotic meds F29    Opiate withdrawal (HCC) F11.23    Severe sepsis (HCC) A41.9, R65.20    Pancreas head lesion S36.200A    Bacteremia R78.81    Sepsis (HCC) A41.9    Acute kidney injury ([address] N17.9    Cerebrovascular accident (CVA) (HCC) I63.9    Stroke-like symptoms R29.90    Chest pain R07.9       Isolation/Infection:   Isolation          No Isolation            Nurse Assessment:  Last Vital Signs: BP (!) 158/83   Pulse 96   Temp 99.3 °F (37.4 °C) (Oral)   Resp 18   Ht 5' (1.524 m)   Wt 120 lb (54.4 kg)   LMP 03/18/2015   SpO2 99%   BMI 23.44 kg/m²     Last documented pain score (0-10 scale): Pain Level: 8  Last Weight:   Wt Readings from Last 1 Encounters:   05/11/19 120 lb (54.4 kg)     Mental Status:  oriented, alert and coherent    IV Access:  - None    Nursing Mobility/ADLs:  Walking   Independent  Transfer  Independent  Bathing  Independent  Dressing  Independent  Toileting  Independent  Feeding  Independent  Med Admin  Independent  Med Delivery   none and whole    Wound Care Documentation and Therapy:        Elimination:  Continence:   · Bowel: No  · Bladder: No  Urinary Catheter: None   Colostomy/Ileostomy/Ileal Conduit: {YES / OW:[id number]}       Date of Last BM: ***  No intake or output data in the 24 hours ending 05/12/19 1150  No intake/output data recorded. Safety Concerns:      At Risk for Falls    Impairments/Disabilities:      Speech and Vision    Nutrition Therapy:  Current Nutrition Therapy:   - Oral Diet: General    Routes of Feeding: Oral  Liquids: Thin Liquids  Daily Fluid Restriction: no  Last Modified Barium Swallow with Video (Video Swallowing Test): not done    Treatments at the Time of Hospital Discharge:   Respiratory Treatments: ***  Oxygen Therapy:  is not on home oxygen therapy. Ventilator:    - No ventilator support    Rehab Therapies: {THERAPEUTIC INTERVENTION:[id number]}  Weight Bearing Status/Restrictions: No weight bearing restirctions  Other Medical Equipment (for information only, NOT a DME order):  walker  Other Treatments: ***    Patient's personal belongings (please select all that are sent with patient):  Glasses    RN SIGNATURE:  Electronically signed by Neville Braden RN on 5/12/19 at 12:48 PM    CASE MANAGEMENT/SOCIAL WORK SECTION    Inpatient Status Date: ***    Readmission Risk Assessment Score:  Readmission Risk              Risk of Unplanned Readmission:        38           Discharging to Facility/ Agency   · Name: [address] [place] [address]   · Address:  · Phone:[phone]  · Fax:[phone]    Dialysis Facility (if applicable)   · Name:  · Address:  · Dialysis Schedule:  · Phone:  · Fax:    / signature: Electronically signed by Vikash Sher RN on 5/13/19 at 11:22 AM    PHYSICIAN SECTION    Prognosis: Good    Condition at Discharge: Stable    Rehab Potential (if transferring to Rehab): Good    Recommended Labs or Other Treatments After Discharge:     Physician Certification: I certify the above information and transfer of Emi Daniels  is necessary for the continuing treatment of the diagnosis listed and that she requires Home Care for greater 30 days.      Update Admission H&P: No change in H&P    PHYSICIAN SIGNATURE:  Electronically signed by Shae Oconnor MD on 5/12/19 at 11:50 AM

## 2019-05-12 NOTE — ED PROVIDER NOTES
[facility]  Emergency Department Encounter  EmergencyMedicine Resident     Pt Name:Emi Washington  MRN: [id number]  Birthdate 1955  Date of evaluation: 5/11/19  PCP:  No primary care provider on file. CHIEF COMPLAINT       Chief Complaint   Patient presents with    Shortness of Breath     pt c/o cough, CP, SOB, and frontal headache. HISTORY OF PRESENT ILLNESS  (Location/Symptom, Timing/Onset, Context/Setting, Quality, Duration, Modifying Factors, Severity.)      Emi Daniels is a 75 y.o. female who presents with multiple complaints. Patient states she has had 5 days of symptoms. Started with dry cough. Patient then states she started to get chest pain and shortness of breath. Patient also complaining of headache behind her eyes. Does state that she has had similar headaches with her migraines in the past and had been on Imitrex before. Denies this being the worse headache of her life. Patient is poor historian and unable to give me much information. States it hurts in the front of her chest but unable to describe it and states it goes to her left shoulder. Worse with coughing. Does admit to associated shortness of breath but no nausea or vomiting. States that her urine has been darker. Denies any weakness or numbness.     PAST MEDICAL / SURGICAL / SOCIAL / FAMILY HISTORY      has a past medical history of Appendicitis, CHF (congestive heart failure) (HCC), Chronic respiratory failure with hypoxia and hypercapnia (HCC), Chronic rhinitis, Cigarette smoker, COPD (chronic obstructive pulmonary disease) ([address] Depression, Dysphagia, Essential hypertension, GERD (gastroesophageal reflux disease), Heart failure, diastolic, with acute decompensation ([address] Hepatitis C, chronic ([address] History of smoking at least 1 pack per day for at least 30 years, Hyperlipidemia, Hypertension, Migraine, Moderate COPD (chronic obstructive pulmonary disease) ([address] Multiple transfusions, Osteoarthritis, Pneumonia, Scoliosis, and Substance abuse ([facility] [address] has a past surgical history that includes  section; LEEP (); Upper gastrointestinal endoscopy (); Gastric bypass surgery; Appendectomy (2017); laparoscopic appendectomy (N/A, 3/27/2017); Upper gastrointestinal endoscopy (2018); Upper gastrointestinal endoscopy (N/A, 2018); pr colonoscopy w/biopsy single/multiple (N/A, 11/10/2018); and pr egd transoral biopsy single/multiple (N/A, 2018).     Social History     Socioeconomic History    Marital status: Single     Spouse name: Not on file    Number of children: Not on file    Years of education: Not on file    Highest education level: Not on file   Occupational History    Not on file   Social Needs    Financial resource strain: Not on file    Food insecurity:     Worry: Not on file     Inability: Not on file    Transportation needs:     Medical: Not on file     Non-medical: Not on file   Tobacco Use    Smoking status: Former Smoker     Packs/day: 1.00     Years: 40.00     Pack years: 40.00     Types: Cigarettes     Start date:      Last attempt to quit: 2018     Years since quittin.5    Smokeless tobacco: Never Used   Substance and Sexual Activity    Alcohol use: No     Alcohol/week: 0.0 oz    Drug use: No     Types: IV, Cocaine     Comment: past history    Sexual activity: Not on file   Lifestyle    Physical activity:     Days per week: Not on file     Minutes per session: Not on file    Stress: Not on file   Relationships    Social connections:     Talks on phone: Not on file     Gets together: Not on file     Attends [employer] service: Not on file     Active member of club or organization: Not on file     Attends meetings of clubs or organizations: Not on file     Relationship status: Not on file    Intimate partner violence:     Fear of current or ex partner: Not on file     Emotionally abused: Not on file     Physically abused: Not on file Forced sexual activity: Not on file   Other Topics Concern    Not on file   Social History Narrative    Not on file       No family history on file. Allergies:  Aspirin; Keflet [cephalexin]; and Motrin [ibuprofen]    Home Medications:  Prior to Admission medications    Medication Sig Start Date End Date Taking?  Authorizing Provider   benzonatate (TESSALON PERLES) 100 MG capsule Take 1 capsule by mouth 3 times daily as needed for Cough 3/11/19   Shelbie Boas Wurst, DO   carvedilol (COREG) 6.25 MG tablet Take 1 tablet by mouth 2 times daily (with meals) 2/7/19   Laly Ferrera MD   ferrous sulfate (MELISSA-HAO) 325 (65 Fe) MG tablet Take 1 tablet by mouth daily 2/7/19   Laly Ferrera MD   ondansetron (ZOFRAN ODT) 4 MG disintegrating tablet Take 1 tablet by mouth every 8 hours as needed for Nausea or Vomiting 1/11/19   Mumtaz Narvaez MD   melatonin 1 MG tablet Take 1 tablet by mouth nightly as needed for Sleep 1/11/19   Mumtaz Narvaez MD   ipratropium-albuterol (DUONEB) 0.5-2.5 (3) MG/3ML SOLN nebulizer solution Inhale 3 mLs into the lungs every 4 hours as needed for Shortness of Breath 1/11/19   Mumtaz Narvaez MD   benzocaine-menthol (CEPACOL SORE THROAT) 15-3.6 MG lozenge Take 1 lozenge by mouth every 2 hours as needed for Sore Throat 1/11/19   Mumtaz Narvaez MD   mometasone-formoterol [facility]) 100-5 MCG/ACT inhaler Inhale 2 puffs into the lungs 2 times daily 12/5/18   Quita St MD   albuterol sulfate HFA (PROVENTIL HFA) 108 (90 Base) MCG/ACT inhaler Inhale 1-2 puffs into the lungs every 4 hours as needed for Wheezing or Shortness of Breath (Space out to every 6 hours as symptoms improve) 12/5/18   [address]a [address] MD   pantoprazole (PROTONIX) 40 MG tablet Take 1 tablet by mouth 2 times daily (before meals) 11/13/18   Jennifer Edge MD   folic acid (FOLVITE) 1 MG tablet Take 1 tablet by mouth daily 10/13/18   Geoff Carlos MD   vitamin B-1 100 MG tablet Take 1 tablet by mouth daily 10/13/18   Nunu Julien Marianela Santana MD   vitamin B-12 250 MCG tablet Take 1 tablet by mouth daily 10/13/18   Bethany Cade MD   docusate sodium (COLACE) 100 MG capsule Take 1 capsule by mouth 2 times daily 10/12/18   Bethany Cade MD   gabapentin (NEURONTIN) 300 MG capsule Take 300 mg by mouth 3 times daily. Eunice Oseguera Historical Provider, MD   buprenorphine-naloxone (SUBOXONE) 12-3 MG sublingual film Place 1 Film under the tongue daily. Eunice Oseguera Historical Provider, MD   guaiFENesin (MUCINEX) 600 MG extended release tablet Take 1 tablet by mouth 2 times daily 9/13/18   Annita Plata MD   acetaminophen (APAP EXTRA STRENGTH) 500 MG tablet Take 2 tablets by mouth every 8 hours as needed for Pain 8/12/18   Howard Hawley MD   Multiple Vitamins-Minerals (THERAPEUTIC MULTIVITAMIN-MINERALS) tablet Take 1 tablet by mouth daily 8/12/18   Howard Hawley MD       REVIEW OF SYSTEMS    (2-9 systems for level 4, 10 or more for level 5)      Review of Systems   Constitutional: Negative for chills and fever. HENT: Negative for congestion and rhinorrhea. Eyes: Negative for pain and visual disturbance. Respiratory: Positive for cough and shortness of breath. Cardiovascular: Positive for chest pain. Negative for palpitations. Gastrointestinal: Negative for abdominal pain, constipation, diarrhea, nausea and vomiting. Genitourinary: Negative for difficulty urinating and dysuria. Musculoskeletal: Negative for gait problem and neck pain. Skin: Negative for rash and wound. Neurological: Positive for headaches. Negative for weakness and numbness. PHYSICAL EXAM   (up to 7 for level 4, 8 or more for level 5)      INITIAL VITALS:   BP (!) 154/80   Pulse 90   Temp 98 °F (36.7 °C) (Oral)   Resp 18   Ht 5' (1.524 m)   Wt 120 lb (54.4 kg)   LMP 03/18/2015   SpO2 99%   BMI 23.44 kg/m²     Physical Exam   Constitutional: She is oriented to person, place, and time. She appears well-developed and well-nourished. No distress.    HENT:   Head: Normocephalic and atraumatic. Mouth/Throat: Oropharynx is clear and moist.   Eyes: Pupils are equal, round, and reactive to light. Conjunctivae and EOM are normal.   Neck: Neck supple. No tracheal deviation present. Cardiovascular: Normal rate, regular rhythm, normal heart sounds and intact distal pulses. Exam reveals no gallop and no friction rub. No murmur heard. Pulmonary/Chest: Effort normal and breath sounds normal. No stridor. No respiratory distress. She has no wheezes. She has no rales. Mild crackles left lower base   Abdominal: Soft. She exhibits no distension and no mass. There is no tenderness. There is no rebound and no guarding. Musculoskeletal: She exhibits no edema, tenderness or deformity. Neurological: She is alert and oriented to person, place, and time. Skin: Skin is warm and dry. No rash noted. She is not diaphoretic.        DIFFERENTIAL  DIAGNOSIS     PLAN (LABS / IMAGING / EKG):  Orders Placed This Encounter   Procedures    XR CHEST STANDARD (2 VW)    CBC WITH AUTO DIFFERENTIAL    BASIC METABOLIC PANEL    Troponin    Urinalysis Reflex to Culture    Brain Natriuretic Peptide    Urinalysis Reflex to Culture    EKG 12 Lead    Insert peripheral IV    PATIENT STATUS (FROM ED OR OR/PROCEDURAL) Observation       MEDICATIONS ORDERED:  Orders Placed This Encounter   Medications    0.9 % sodium chloride bolus    prochlorperazine (COMPAZINE) injection 10 mg    diphenhydrAMINE (BENADRYL) tablet 25 mg    benzonatate (TESSALON) capsule 100 mg    carvedilol (COREG) tablet 6.25 mg       DDX: Viral illness, pneumonia, bronchitis, chest pain, ACS/MI, pneumothorax, PE, headache, migraine, tension, subarachnoid hemorrhage    DIAGNOSTIC RESULTS / EMERGENCY DEPARTMENT COURSE / MDM     LABS:  Results for orders placed or performed during the hospital encounter of 05/11/19   CBC WITH AUTO DIFFERENTIAL   Result Value Ref Range    WBC 9.1 3.5 - 11.3 k/uL    RBC 5.02 3.95 - 5.11 m/uL Hemoglobin 13.7 11.9 - 15.1 g/dL    Hematocrit 45.6 36.3 - 47.1 %    MCV 90.8 82.6 - 102.9 fL    MCH 27.3 25.2 - 33.5 pg    MCHC 30.0 28.4 - 34.8 g/dL    RDW 23.6 (H) 11.8 - 14.4 %    Platelets 384 (H) 217 - 453 k/uL    MPV 8.6 8.1 - 13.5 fL    NRBC Automated 0.0 0.0 per 100 WBC    Differential Type NOT REPORTED     WBC Morphology NOT REPORTED     RBC Morphology NOT REPORTED     Platelet Estimate NOT REPORTED     Immature Granulocytes 0 0 %    Seg Neutrophils 60 36 - 66 %    Lymphocytes 33 24 - 44 %    Monocytes 6 1 - 7 %    Eosinophils % 1 1 - 4 %    Basophils 0 0 - 2 %    Absolute Immature Granulocyte 0.00 0.00 - 0.30 k/uL    Segs Absolute 5.46 1.8 - 7.7 k/uL    Absolute Lymph # 3.00 1.0 - 4.8 k/uL    Absolute Mono # 0.55 0.1 - 0.8 k/uL    Absolute Eos # 0.09 0.0 - 0.4 k/uL    Basophils # 0.00 0.0 - 0.2 k/uL    Morphology ANISOCYTOSIS PRESENT    BASIC METABOLIC PANEL   Result Value Ref Range    Glucose 110 (H) 70 - 99 mg/dL    BUN 12 8 - 23 mg/dL    CREATININE 0.54 0.50 - 0.90 mg/dL    Bun/Cre Ratio NOT REPORTED 9 - 20    Calcium 9.9 8.6 - 10.4 mg/dL    Sodium 137 135 - 144 mmol/L    Potassium 4.4 3.7 - 5.3 mmol/L    Chloride 102 98 - 107 mmol/L    CO2 22 20 - 31 mmol/L    Anion Gap 13 9 - 17 mmol/L    GFR Non-African American >60 >60 mL/min    GFR African American >60 >60 mL/min    GFR Comment          GFR Staging NOT REPORTED    Troponin   Result Value Ref Range    Troponin, High Sensitivity 12 0 - 14 ng/L    Troponin T NOT REPORTED <0.03 ng/mL    Troponin Interp NOT REPORTED        IMPRESSION: Patient evaluated by myself and the attending physician. Patient appears in no acute distress. Slightly tachycardic on arrival during my assessment patient with heart rate 104. Lungs with mild crackles left side. No tachypnea and no accessory muscle usage. Patient no respiratory distress. Abdomen soft, nontender, nondistended.   No focal neurologic deficits on exam.  We'll continue with cardiac workup given her chest pain, chest x-ray, and symptomatic management. Tachycardia likely due to patient being slightly dry. We'll reassess the patient is still tachycardic will have to rule out PE. Low concern for PE given patient is not to Neck and in no respiratory distress. No asymmetric leg swelling. Low concern for subarachnoid hemorrhages patient has had similar headaches in the past and describes this as her typical migraine. Blood pressure was elevated on arrival documented at 154/110 but patient states she did not take her carvedilol today. Carvedilol ordered. RADIOLOGY:  Xr Chest Standard (2 Vw)    Result Date: 5/12/2019  EXAMINATION: TWO XRAY VIEWS OF THE CHEST 5/12/2019 12:39 am COMPARISON: 04/20/2019 HISTORY: ORDERING SYSTEM PROVIDED HISTORY: cough TECHNOLOGIST PROVIDED HISTORY: cough Ordering Physician Provided Reason for Exam: cough Acuity: Unknown Type of Exam: Unknown FINDINGS: The cardiac silhouette is within normal limits for size. The pulmonary vasculature is within normal limits. There is no focal consolidation, pleural effusion or pneumothorax. The visualized osseous structures demonstrate no acute abnormality. No acute cardiopulmonary abnormality. EKG  EKG Interpretation    Interpreted by emergency department physician    Rhythm: sinus tachycardia  Rate: 108  Axis: normal  Ectopy: none  Conduction: normal, poor r wave progression likely due to lead placement error  ST Segments: noisy baseline.  no acute change  T Waves: non specific changes  Q Waves: none    Clinical Impression: non-specific EKG    Burak Galvan      All EKG's are interpreted by the Emergency Department Physician who either signs or Co-signs this chart in the absence of a cardiologist.    Heart Score    Heart Score for chest pain patients  History: Slightly Suspicious  ECG: Non-Specifc repolarization disturbance/LBTB/PM  Patient Age: > 65 years  Risk Factors: > 3 Risk factors or history of atherosclerotic disease*  Troponin: < 1X normal limit  Heart Score Total: 5    Score 0 - 3 = 2.5%  MACE over next 6 wks = Discharge home  Score 4 - 6 = 20.3%  MACE over next 6 wks = Obs admit  Score 7 - 10 = 72.7%  MACE over next 6 wks = Early invasive Rx      EMERGENCY DEPARTMENT COURSE:  Patient reassessed. Patient was resting comfortably in bed. Still states she is having some symptoms. Patient poor historian and unable give me much information as she discussed sleep. Did complain of chest pain but difficult to assess as patient poor historian. Does state it radiated to her left shoulder. Did have mild crackles on exam left lower lung but no overt signs of fluid overload, lower extremity edema. Patient appeared more dry andis given a bolus of fluid. Patient with heart score of 5. Will admit to ETU for symptomatic management and cardiology consult. Patient agrees with plan. PROCEDURES:  None    CONSULTS:  IP CONSULT TO CARDIOLOGY    CRITICAL CARE:  None    FINAL IMPRESSION      1. Chest pain, unspecified type    2. Cough          DISPOSITION / PLAN     DISPOSITION Admitted 05/12/2019 02:37:30 AM      PATIENT REFERRED TO:  No follow-up provider specified.     DISCHARGE MEDICATIONS:  Current Discharge Medication List          Yumiko Ge DO  Emergency Medicine Resident    (Please note that portions of thisnote were completed with a voice recognition program.  Efforts were made to edit the dictations but occasionally words are mis-transcribed.)        Yumiko Ge DO  05/12/19 [id number]

## 2019-05-12 NOTE — ED NOTES
Handoff report given to [facility]. Transfer of care given.      Osbaldo Shanks RN  05/12/19 [id number]

## 2019-05-12 NOTE — DISCHARGE SUMMARY
CDU Discharge Summary        Patient:  Debbie Wynne  YOB: 1955    MRN: [id number]   Acct: [de-identified]    Primary Care Physician: No primary care provider on file. Admit date:  5/11/2019 11:08 PM  Discharge date: 5/12/2019  1:02 PM     Discharge Diagnoses:     Acute sharp midsternal chest pain, nonexertional shortness of breath, productive cough, and acute on chronic frontal headache due to cute bronchitis and chronic migraines, respectively. Improved with IM Compazine ×1, PO Benadryl ×1, by mouth Tessalon Perles, PO Tylenol, IVF. Patient started on by PO Z-Brandon for bronchitis upon discharge    Follow-up:  Call today/tomorrow for a follow up appointment with No primary care provider on file. , or return to the Emergency Room with worsening symptoms    Stressed to patient the importance of following up with primary care doctor for further workup/management of symptoms. Pt verbalizes understanding and agrees with plan.     Discharge Medications:  Changes to medications          [facility] Medication Instructions [id number]    Printed on:05/12/19 1319   Medication Information                      acetaminophen (APAP EXTRA STRENGTH) 500 MG tablet  Take 2 tablets by mouth every 8 hours as needed for Pain             albuterol sulfate HFA (PROVENTIL HFA) 108 (90 Base) MCG/ACT inhaler  Inhale 1-2 puffs into the lungs every 4 hours as needed for Wheezing or Shortness of Breath (Space out to every 6 hours as symptoms improve)             azithromycin (ZITHROMAX) 250 MG tablet  Take 2 tablets (500 mg) on Day 1, followed by 1 tablet (250 mg) once daily on Days 2 through 5.             benzocaine-menthol (CEPACOL SORE THROAT) 15-3.6 MG lozenge  Take 1 lozenge by mouth every 2 hours as needed for Sore Throat             benzonatate (TESSALON PERLES) 100 MG capsule  Take 1 capsule by mouth 3 times daily as needed for Cough             buprenorphine-naloxone (SUBOXONE) 12-3 MG sublingual film  Place 1 Film under the tongue daily. .             carvedilol (COREG) 6.25 MG tablet  Take 1 tablet by mouth 2 times daily (with meals)             docusate sodium (COLACE) 100 MG capsule  Take 1 capsule by mouth 2 times daily             ferrous sulfate (MELISSA-HAO) 325 (65 Fe) MG tablet  Take 1 tablet by mouth daily             folic acid (FOLVITE) 1 MG tablet  Take 1 tablet by mouth daily             gabapentin (NEURONTIN) 300 MG capsule  Take 300 mg by mouth 3 times daily. [facility]              guaiFENesin (MUCINEX) 600 MG extended release tablet  Take 1 tablet by mouth 2 times daily             ipratropium-albuterol (DUONEB) 0.5-2.5 (3) MG/3ML SOLN nebulizer solution  Inhale 3 mLs into the lungs every 4 hours as needed for Shortness of Breath             melatonin 1 MG tablet  Take 1 tablet by mouth nightly as needed for Sleep             mometasone-formoterol (DULERA) 100-5 MCG/ACT inhaler  Inhale 2 puffs into the lungs 2 times daily             Multiple Vitamins-Minerals (THERAPEUTIC MULTIVITAMIN-MINERALS) tablet  Take 1 tablet by mouth daily             pantoprazole (PROTONIX) 40 MG tablet  Take 1 tablet by mouth 2 times daily (before meals)             vitamin B-1 100 MG tablet  Take 1 tablet by mouth daily             vitamin B-12 250 MCG tablet  Take 1 tablet by mouth daily                 Diet:  DIET GENERAL; , Advance as tolerated     Activity:  As tolerated    Consultants: IP CONSULT TO CARDIOLOGY    Procedures:  Not indicated     Diagnostic Test:   Results for orders placed or performed during the hospital encounter of 05/11/19   CBC WITH AUTO DIFFERENTIAL   Result Value Ref Range    WBC 9.1 3.5 - 11.3 k/uL    RBC 5.02 3.95 - 5.11 m/uL    Hemoglobin 13.7 11.9 - 15.1 g/dL    Hematocrit 45.6 36.3 - 47.1 %    MCV 90.8 82.6 - 102.9 fL    MCH 27.3 25.2 - 33.5 pg    MCHC 30.0 28.4 - 34.8 g/dL    RDW 23.6 (H) 11.8 - 14.4 %    Platelets 838 (H) 393 - 453 k/uL    MPV 8.6 8.1 - 13.5 fL    NRBC Automated 0.0 0.0 per 100 WBC    Differential Type NOT REPORTED     WBC Morphology NOT REPORTED     RBC Morphology NOT REPORTED     Platelet Estimate NOT REPORTED     Immature Granulocytes 0 0 %    Seg Neutrophils 60 36 - 66 %    Lymphocytes 33 24 - 44 %    Monocytes 6 1 - 7 %    Eosinophils % 1 1 - 4 %    Basophils 0 0 - 2 %    Absolute Immature Granulocyte 0.00 0.00 - 0.30 k/uL    Segs Absolute 5.46 1.8 - 7.7 k/uL    Absolute Lymph # 3.00 1.0 - 4.8 k/uL    Absolute Mono # 0.55 0.1 - 0.8 k/uL    Absolute Eos # 0.09 0.0 - 0.4 k/uL    Basophils # 0.00 0.0 - 0.2 k/uL    Morphology ANISOCYTOSIS PRESENT    BASIC METABOLIC PANEL   Result Value Ref Range    Glucose 110 (H) 70 - 99 mg/dL    BUN 12 8 - 23 mg/dL    CREATININE 0.54 0.50 - 0.90 mg/dL    Bun/Cre Ratio NOT REPORTED 9 - 20    Calcium 9.9 8.6 - 10.4 mg/dL    Sodium 137 135 - 144 mmol/L    Potassium 4.4 3.7 - 5.3 mmol/L    Chloride 102 98 - 107 mmol/L    CO2 22 20 - 31 mmol/L    Anion Gap 13 9 - 17 mmol/L    GFR Non-African American >60 >60 mL/min    GFR African American >60 >60 mL/min    GFR Comment          GFR Staging NOT REPORTED    Troponin   Result Value Ref Range    Troponin, High Sensitivity 12 0 - 14 ng/L    Troponin T NOT REPORTED <0.03 ng/mL    Troponin Interp NOT REPORTED    Urinalysis Reflex to Culture   Result Value Ref Range    Color, UA DARK YELLOW (A) YELLOW    Turbidity UA CLEAR CLEAR    Glucose, Ur NEGATIVE NEGATIVE    Bilirubin Urine NEGATIVE  Verified by ictotest. (A) NEGATIVE    Ketones, Urine TRACE (A) NEGATIVE    Specific Gravity, UA 1.022 1.005 - 1.030    Urine Hgb NEGATIVE NEGATIVE    pH, UA 6.0 5.0 - 8.0    Protein, UA NEGATIVE NEGATIVE    Urobilinogen, Urine ELEVATED (A) Normal    Nitrite, Urine NEGATIVE NEGATIVE    Leukocyte Esterase, Urine TRACE (A) NEGATIVE    Urinalysis Comments NOT REPORTED    Basic Metabolic Panel   Result Value Ref Range    Glucose 83 70 - 99 mg/dL    BUN 9 8 - 23 mg/dL    CREATININE 0.39 (L) 0.50 - 0.90 mg/dL    Bun/Cre Ratio  9 - 20 Calcium 6.8 (L) 8.6 - 10.4 mg/dL    Sodium 141 135 - 144 mmol/L    Potassium 3.3 (L) 3.7 - 5.3 mmol/L    Chloride 113 (H) 98 - 107 mmol/L    CO2 17 (L) 20 - 31 mmol/L    Anion Gap 11 9 - 17 mmol/L    GFR Non-African American >60 >60 mL/min    GFR African American >60 >60 mL/min    GFR Comment          GFR Staging     Brain Natriuretic Peptide   Result Value Ref Range    Pro- <300 pg/mL    BNP Interpretation Pro-BNP Reference Range:    Microscopic Urinalysis   Result Value Ref Range    -          WBC, UA 2 TO 5 0 - 5 /HPF    RBC, UA 2 TO 5 0 - 4 /HPF    Casts UA  0 - 8 /LPF     10 TO 20 HYALINE Reference range defined for non-centrifuged specimen. Crystals UA NOT REPORTED None /HPF    Epithelial Cells UA 5 TO 10 0 - 5 /HPF    Renal Epithelial, Urine NOT REPORTED 0 /HPF    Bacteria, UA NOT REPORTED None    Mucus, UA NOT REPORTED None    Trichomonas, UA NOT REPORTED None    Amorphous, UA NOT REPORTED None    Other Observations UA NOT REPORTED NOT REQ. Yeast, UA NOT REPORTED None     Xr Chest Standard (2 Vw)    Result Date: 5/12/2019  EXAMINATION: TWO XRAY VIEWS OF THE CHEST 5/12/2019 12:39 am COMPARISON: 04/20/2019 HISTORY: ORDERING SYSTEM PROVIDED HISTORY: cough TECHNOLOGIST PROVIDED HISTORY: cough Ordering Physician Provided Reason for Exam: cough Acuity: Unknown Type of Exam: Unknown FINDINGS: The cardiac silhouette is within normal limits for size. The pulmonary vasculature is within normal limits. There is no focal consolidation, pleural effusion or pneumothorax. The visualized osseous structures demonstrate no acute abnormality. No acute cardiopulmonary abnormality.      Xr Hip Right (2-3 Views)    Result Date: 4/21/2019  EXAMINATION: 2 XRAY VIEWS OF THE RIGHT HIP 4/21/2019 10:18 am COMPARISON: None HISTORY: ORDERING SYSTEM PROVIDED HISTORY: hip pan likely due to fall TECHNOLOGIST PROVIDED HISTORY: hip pan likely due to fall FINDINGS: Subtle lucency within the femoral head may indicate a nondisplaced fracture. Flattening of the femoral head with sclerosis suggests avascular necrosis. No hip dislocation. Mild right hip joint space narrowing. Nonspecific area of sclerosis within the intertrochanteric region. Acute fracture of the right femoral head is suspected superimposed upon avascular necrosis. MRI can be obtained if indicated to confirm. Nonspecific area of sclerosis within the right intertrochanteric region may represent a bone island in the absence of risk factors for osseous metastatic disease. Ct Head Wo Contrast    Result Date: 4/21/2019  EXAMINATION: CT OF THE HEAD WITHOUT CONTRAST  4/20/2019 2:07 pm TECHNIQUE: CT of the head was performed without the administration of intravenous contrast. Dose modulation, iterative reconstruction, and/or weight based adjustment of the mA/kV was utilized to reduce the radiation dose to as low as reasonably achievable. COMPARISON: November 7, 2018 HISTORY: ORDERING SYSTEM PROVIDED HISTORY: AMS, facial droop, expressive aphasia TECHNOLOGIST PROVIDED HISTORY: FINDINGS: BRAIN/VENTRICLES: There is no acute intracranial hemorrhage, mass effect or midline shift. No abnormal extra-axial fluid collection. The gray-white differentiation is maintained without evidence of an acute infarct. There is prominence of the ventricles and sulci due to global parenchymal volume loss. There are nonspecific areas of hypoattenuation within the periventricular and subcortical white matter, which likely represent chronic microvascular ischemic change. ORBITS: The visualized portion of the orbits demonstrate no acute abnormality. SINUSES: Air-fluid levels within the bilateral maxillary sinuses. The mastoid air cells are clear. Chronic appearing fracture involving the right medial orbital wall. SOFT TISSUES/SKULL: No acute abnormality of the visualized skull or soft tissues. 1. No acute intracranial abnormality.  2. Air-fluid levels within the bilateral maxillary sinuses. Correlate with symptoms of sinusitis. Critical results were called by Dr. Matheus Holbrook MD to Dr. Deepak Martinez On 4/20/2019 at 15:01. Ct Chest Wo Contrast    Result Date: 4/21/2019  EXAMINATION: CT OF THE CHEST WITHOUT CONTRAST 4/21/2019 8:29 am TECHNIQUE: CT of the chest was performed without the administration of intravenous contrast. Multiplanar reformatted images are provided for review. Dose modulation, iterative reconstruction, and/or weight based adjustment of the mA/kV was utilized to reduce the radiation dose to as low as reasonably achievable. COMPARISON: January 7, 2019 HISTORY: ORDERING SYSTEM PROVIDED HISTORY: ENLARGED LYMPH NODES, CHEST NECK AXILLA TECHNOLOGIST PROVIDED HISTORY: Ordering Physician Provided Reason for Exam: diffuse opacities. recurrent right lower lobe opacity and effusion, rule out obstruction FINDINGS: Mediastinum: No cardiomegaly. No pericardial effusion. Coronary artery calcifications. No mediastinal or hilar adenopathy. Lungs/pleura: No focal consolidation. Bilateral scarring. Small nonspecific dependent left lower lobe opacity. Previously seen nodular opacities within the right lower lobe have resolved. Upper Abdomen: No adrenal nodule. Soft Tissues/Bones: No axillary adenopathy. No acute osseous abnormality. Multiple subtle lucencies are again seen and appear similar to the previous study. Small nonspecific left lower lobe opacity may represent atelectasis with aspiration or pneumonia not excluded. No axillary adenopathy. Xr Chest Portable    Result Date: 5/12/2019  EXAMINATION: ONE XRAY VIEW OF THE CHEST 5/12/2019 10:23 am COMPARISON: May 11, 2019, April 20, 2018 HISTORY: ORDERING SYSTEM PROVIDED HISTORY: cough TECHNOLOGIST PROVIDED HISTORY: cough Ordering Physician Provided Reason for Exam: COUGH Acuity: Unknown Type of Exam: Unknown FINDINGS: The cardiomediastinal silhouette is unchanged in appearance.  There is no consolidation, pneumothorax, or evidence of edema. No effusion is appreciated. The osseous structures are unchanged in appearance. IV site in the mid left upper arm. Unchanged appearance of the chest without acute airspace disease identified. Xr Chest Portable    Result Date: 4/20/2019  EXAMINATION: SINGLE XRAY VIEW OF THE CHEST 4/20/2019 2:11 pm COMPARISON: Chest radiograph dated 03/11/2019 and 11/04/2018. Chest CT dated 01/07/2019 HISTORY: ORDERING SYSTEM PROVIDED HISTORY: AMS TECHNOLOGIST PROVIDED HISTORY: AMS FINDINGS: Cardiac and mediastinal shadows are normal.  No infiltrates. No effusions. No pneumothorax. No free air below the diaphragm. There are multiple linear foreign bodies within the soft tissues above bilateral clavicular heads similar to multiple prior exams. No acute findings in the chest. Multiple linear foreign bodies within the soft tissues above bilateral clavicular heads similar to multiple prior exams. Cta Neck W Contrast    Result Date: 4/21/2019  EXAMINATION: CTA OF THE HEAD WITH CONTRAST; CTA OF THE NECK 4/20/2019 2:44 pm: TECHNIQUE: CTA of the head/brain was performed with the administration of intravenous contrast. Multiplanar reformatted images are provided for review. MIP images are provided for review. Dose modulation, iterative reconstruction, and/or weight based adjustment of the mA/kV was utilized to reduce the radiation dose to as low as reasonably achievable.; CTA of the neck was performed with the administration of intravenous contrast. Multiplanar reformatted images are provided for review. MIP images are provided for review. Stenosis of the internal carotid arteries measured using NASCET criteria. Dose modulation, iterative reconstruction, and/or weight based adjustment of the mA/kV was utilized to reduce the radiation dose to as low as reasonably achievable. 3D reconstructed images were performed on a separate workstation and provided for review. COMPARISON: None.  HISTORY: ORDERING SYSTEM PROVIDED HISTORY: expressive aphasia TECHNOLOGIST PROVIDED HISTORY: Ordering Physician Provided Reason for Exam: r/o stroke Acuity: Acute; ORDERING SYSTEM PROVIDED HISTORY: expressive aphasia TECHNOLOGIST PROVIDED HISTORY: FINDINGS: CTA NECK: AORTIC ARCH/ARCH VESSELS: No significant stenosis is seen of the innominate artery or subclavian arteries. CAROTID ARTERIES: The common carotid arteries are normal in appearance without evidence of a flow limiting stenosis. The internal carotid arteries are normal in appearance without evidence of a flow limiting stenosis by NASCET criteria. No dissection or arterial injury is seen. VERTEBRAL ARTERIES: The vertebral arteries both arise from the subclavian arteries and are normal in caliber without evidence of flow limiting stenosis or dissection. SOFT TISSUES: Emphysematous changes at the lung apices. Thyroid gland is heterogeneously enlarged with multiple thyroid nodules. Multiple left supraclavicular lymph nodes are not enlarged by individual size criteria but are conspicuous in numbers. There are left level V lymph nodes which are also conspicuous in numbers. BONES: Multilevel degenerative changes of the cervical spine. Grade 1 anterolisthesis of C2 in relation to C3. Heterogeneous appearance of the osseous marrow. Multiple small lucent lesions throughout the cervical spine. CTA HEAD: ANTERIOR CIRCULATION: Mild atherosclerotic disease involving the intracranial internal carotid arteries. No flow-limiting stenosis. The anterior cerebral and middle cerebral arteries demonstrate no focal stenosis. POSTERIOR CIRCULATION: The posterior cerebral arteries demonstrate no focal stenosis. The vertebral and basilar arteries appear unremarkable. BRAIN: Please see separate CT head report. 1. No high-grade stenosis or focal occlusion involving the intracranial or cervical vasculature. No evidence of acute dissection. No evidence of aneurysm.  2. Heterogeneous appearance of the osseous structures with multiple tiny lucent lesions. While this could be secondary to severe osteopenia and degenerative changes, possibility of osseous metastasis or multiple myeloma should be excluded. Recommend nonemergent nuclear medicine bone scan. 3. Multiple nonenlarged but conspicuous left supraclavicular and left level V lymph nodes. These are asymmetric compared to right. While this may be reactive or inflammatory, consider short interval follow-up to document resolution and exclude neoplastic process. 4. Heterogeneously enlarged thyroid gland with multiple thyroid nodules. Recommend nonemergent thyroid ultrasound. 5. Emphysema. Cta Head W Contrast    Result Date: 4/21/2019  EXAMINATION: CTA OF THE HEAD WITH CONTRAST; CTA OF THE NECK 4/20/2019 2:44 pm: TECHNIQUE: CTA of the head/brain was performed with the administration of intravenous contrast. Multiplanar reformatted images are provided for review. MIP images are provided for review. Dose modulation, iterative reconstruction, and/or weight based adjustment of the mA/kV was utilized to reduce the radiation dose to as low as reasonably achievable.; CTA of the neck was performed with the administration of intravenous contrast. Multiplanar reformatted images are provided for review. MIP images are provided for review. Stenosis of the internal carotid arteries measured using NASCET criteria. Dose modulation, iterative reconstruction, and/or weight based adjustment of the mA/kV was utilized to reduce the radiation dose to as low as reasonably achievable. 3D reconstructed images were performed on a separate workstation and provided for review. COMPARISON: None.  HISTORY: ORDERING SYSTEM PROVIDED HISTORY: expressive aphasia TECHNOLOGIST PROVIDED HISTORY: Ordering Physician Provided Reason for Exam: r/o stroke Acuity: Acute; ORDERING SYSTEM PROVIDED HISTORY: expressive aphasia TECHNOLOGIST PROVIDED HISTORY: FINDINGS: CTA NECK: AORTIC ARCH/ARCH VESSELS: No significant stenosis is seen of the innominate artery or subclavian arteries. CAROTID ARTERIES: The common carotid arteries are normal in appearance without evidence of a flow limiting stenosis. The internal carotid arteries are normal in appearance without evidence of a flow limiting stenosis by NASCET criteria. No dissection or arterial injury is seen. VERTEBRAL ARTERIES: The vertebral arteries both arise from the subclavian arteries and are normal in caliber without evidence of flow limiting stenosis or dissection. SOFT TISSUES: Emphysematous changes at the lung apices. Thyroid gland is heterogeneously enlarged with multiple thyroid nodules. Multiple left supraclavicular lymph nodes are not enlarged by individual size criteria but are conspicuous in numbers. There are left level V lymph nodes which are also conspicuous in numbers. BONES: Multilevel degenerative changes of the cervical spine. Grade 1 anterolisthesis of C2 in relation to C3. Heterogeneous appearance of the osseous marrow. Multiple small lucent lesions throughout the cervical spine. CTA HEAD: ANTERIOR CIRCULATION: Mild atherosclerotic disease involving the intracranial internal carotid arteries. No flow-limiting stenosis. The anterior cerebral and middle cerebral arteries demonstrate no focal stenosis. POSTERIOR CIRCULATION: The posterior cerebral arteries demonstrate no focal stenosis. The vertebral and basilar arteries appear unremarkable. BRAIN: Please see separate CT head report. 1. No high-grade stenosis or focal occlusion involving the intracranial or cervical vasculature. No evidence of acute dissection. No evidence of aneurysm. 2. Heterogeneous appearance of the osseous structures with multiple tiny lucent lesions. While this could be secondary to severe osteopenia and degenerative changes, possibility of osseous metastasis or multiple myeloma should be excluded. Recommend nonemergent nuclear medicine bone scan.  3. Multiple nonenlarged but conspicuous left supraclavicular and left level V lymph nodes. These are asymmetric compared to right. While this may be reactive or inflammatory, consider short interval follow-up to document resolution and exclude neoplastic process. 4. Heterogeneously enlarged thyroid gland with multiple thyroid nodules. Recommend nonemergent thyroid ultrasound. 5. Emphysema. Mri Brain Wo Contrast    Result Date: 4/22/2019  EXAMINATION: MRI OF THE BRAIN WITHOUT CONTRAST,  4/22/2019 11:56 am TECHNIQUE: Multiplanar multisequence MRI of the brain was performed without the administration of intravenous contrast. COMPARISON: 04/20/2019. HISTORY: ORDERING SYSTEM PROVIDED HISTORY: Stroke Facial droop and slurred speech. Initial examination. FINDINGS: INTRACRANIAL STRUCTURES/VENTRICLES: There are no areas of restricted diffusion to suggest an acute infarct. The cerebral and cerebellar parenchyma demonstrate volume loss. There are a few scattered areas of increased T2 signal noted supratentorially which are compatible with moderate chronic microvascular white matter ischemic disease. There is a chronic right paramidline pontine lacunar infarct. No abnormal extra-axial fluid collections. The ventricles are normal in size. Normal major intracranial flow voids are noted. ORBITS: The visualized portion of the orbits demonstrate no acute abnormality. SINUSES: Air-fluid levels are noted in the maxillary sinuses. Mucosal thickening is noted in the left ethmoid air cells. The mastoid air cells are clear. BONES/SOFT TISSUES: The bone marrow signal intensity appears normal. The soft tissues demonstrate no acute abnormality. 1. No evidence of an acute infarct. 2. Chronic right paramidline pontine infarct. 3. Moderate chronic microvascular white matter ischemic disease noted both supra and infratentorially. 4. Air-fluid levels in the maxillary sinuses, correlate with signs of acute sinusitis.      Xr Hip 2-3 Vw W Pelvis Right    Result Date: 4/21/2019  EXAMINATION: SINGLE XRAY VIEW OF THE PELVIS AND 2 XRAY VIEWS RIGHT HIP 4/21/2019 1:55 pm COMPARISON: Earlier same day HISTORY: ORDERING SYSTEM PROVIDED HISTORY: pain TECHNOLOGIST PROVIDED HISTORY: Cross table lateral and AP pelvis pain Right hip pain status post fall. Initial encounter. FINDINGS: There is flattening of the right femoral head which may be secondary to remote changes of AVN or remote trauma. Right hip osteoarthritis is noted with marginal osteophytes. No evidence of a discrete fracture line. However, given symptoms of acute right hip pain, cross-sectional imaging may be helpful for further evaluation. Flattening of the right femoral head possibly secondary to remote trauma, remote infection or AVN. Moderate right hip osteoarthritis of marginal/ring osteophytes. No acute traumatic displaced fracture. If there is clinical suspicion for occult fracture, cross-sectional imaging with MRI is recommended. Physical Exam:    General appearance - NAD, AOx 3   Lungs -scattered rhonchi bilaterally with good air exchange  Heart - RRR, no M/R/G  Abdomen - Soft, NT/ND  Neurological:  MAEx4, No focal motor deficit, sensory loss  Extremities - Cap refil <2 sec in all ext., no edema  Skin -warm, dry      Hospital Course:  Clinical course has improved, labs and imaging reviewed. Emi Daniels originally presented to the hospital on 5/11/2019 11:08 PM. with acute on chronic frontal headache and addition to acute nonexertional chest pain, shortness of breath, and productive cough. At that time it was determined that She required further observation and cardiology evaluation. She was admitted and labs and imaging were followed daily. Imaging results as above. She is medically stable to be discharged.   PO Z-Brandon upon discharge for treatment of bronchitis      Disposition: Home    Patient stated that they will not drive themselves home from the hospital if they have gotten pain killers/ narcotics earlier that day and that they will arrange for transportation on their own or work with the  for a ride. Patient counseled NOT to drive while under the influence of narcotics/ pain killers. Condition: Good    Patient stable and ready for discharge home. I have discussed plan of care with patient and they are in understanding. They were instructed to read discharge paperwork. All of their questions and concerns were addressed. Time Spent: 0 day      --  Lin Lazaro,   Emergency Medicine Resident Physician    This dictation was generated by voice recognition computer software. Although all attempts are made to edit the dictation for accuracy, there may be errors in the transcription that are not intended.

## 2019-05-12 NOTE — CARE COORDINATION
Case Management Initial Discharge Plan  Emi Daniels,             Met with:patient to discuss discharge plans. Information verified: address, contacts, phone number, , insurance Yes  PCP: No primary care provider on file. Date of last visit: [facility] one week ago     Insurance Provider: paramount     Discharge Planning    Living Arrangements:  Family Members   Support Systems:  Family Members    Home has 1 stories  none stairs to climb to get into front door, elevator stairs to climb to reach second floor  Location of bedroom/bathroom in home main     Patient able to perform ADL's:Assisted    Current Services (outpatient & in home) [facility]   DME equipment: walker, oxygen   DME provider: pharmacy counter- will not need portable for home transport     Pharmacy: [address] [place] [address] Medications:  No  Does patient want to participate in local refill/ meds to beds program?  No    Potential Assistance Needed:  Home Care    Patient agreeable to home care: Yes  Freedom of choice provided:  yes    Prior SNF/Rehab Placement and Facility: none  Agreeable to SNF/Rehab: No  [place] of choice provided: no   Evaluation: n/a    Expected Discharge date:     Patient expects to be discharged to:  home  Follow Up Appointment: Best Day/ Time:      Transportation provider: son vs cab   Transportation arrangements needed for discharge: No    Readmission Risk              Risk of Unplanned Readmission:        38             Does patient have a readmission risk score greater than 14?: Yes  If yes, follow-up appointment must be made within 7 days of discharge. Discharge Plan: Plan to discharge to home with current services- [facility], face sheet faxed. Has established pcp. Does not need oxygen for discharge transport.            Electronically signed by Karey Royal RN on 19 at 9:58 AM

## 2019-05-12 NOTE — CONSULTS
hypertension, GERD (gastroesophageal reflux disease), Heart failure, diastolic, with acute decompensation ([facility] Hepatitis C, chronic (HCC), History of smoking at least 1 pack per day for at least 30 years, Hyperlipidemia, Hypertension, Migraine, Moderate COPD (chronic obstructive pulmonary disease) ([facility] Multiple transfusions, Osteoarthritis, Pneumonia, Scoliosis, and Substance abuse ([facility] Past Surgical History:   has a past surgical history that includes  section; LEEP (); Upper gastrointestinal endoscopy (); Gastric bypass surgery; Appendectomy (2017); laparoscopic appendectomy (N/A, 3/27/2017); Upper gastrointestinal endoscopy (2018); Upper gastrointestinal endoscopy (N/A, 2018); pr colonoscopy w/biopsy single/multiple (N/A, 11/10/2018); and pr egd transoral biopsy single/multiple (N/A, 2018). Home Medications:    Prior to Admission medications    Medication Sig Start Date End Date Taking?  Authorizing Provider   carvedilol (COREG) 6.25 MG tablet Take 1 tablet by mouth 2 times daily (with meals) 19  Yes Suri Azevedo MD   melatonin 1 MG tablet Take 1 tablet by mouth nightly as needed for Sleep 19  Yes Rayo Malone MD   ipratropium-albuterol (DUONEB) 0.5-2.5 (3) MG/3ML SOLN nebulizer solution Inhale 3 mLs into the lungs every 4 hours as needed for Shortness of Breath 19  Yes Rayo Malone MD   mometasone-formoterol [facility]) 100-5 MCG/ACT inhaler Inhale 2 puffs into the lungs 2 times daily 18  Yes Ravi Ram MD   albuterol sulfate HFA (PROVENTIL HFA) 108 (90 Base) MCG/ACT inhaler Inhale 1-2 puffs into the lungs every 4 hours as needed for Wheezing or Shortness of Breath (Space out to every 6 hours as symptoms improve) 18  Yes Ravi Ram MD   pantoprazole (PROTONIX) 40 MG tablet Take 1 tablet by mouth 2 times daily (before meals) 18  Yes Vikas Caban MD   folic acid (FOLVITE) 1 MG tablet Take 1 tablet by mouth daily 10/13/18 Yes Sonia Chiu MD   vitamin B-1 100 MG tablet Take 1 tablet by mouth daily 10/13/18  Yes Sonia Chiu MD   vitamin B-12 250 MCG tablet Take 1 tablet by mouth daily 10/13/18  Yes Sonia Chiu MD   docusate sodium (COLACE) 100 MG capsule Take 1 capsule by mouth 2 times daily 10/12/18  Yes Sonia Chiu MD   gabapentin (NEURONTIN) 300 MG capsule Take 300 mg by mouth 3 times daily. .   Yes Historical Provider, MD   buprenorphine-naloxone (SUBOXONE) 12-3 MG sublingual film Place 1 Film under the tongue daily. .   Yes Historical Provider, MD   acetaminophen (APAP EXTRA STRENGTH) 500 MG tablet Take 2 tablets by mouth every 8 hours as needed for Pain 8/12/18  Yes Candis Auguste MD   Multiple Vitamins-Minerals (THERAPEUTIC MULTIVITAMIN-MINERALS) tablet Take 1 tablet by mouth daily 8/12/18  Yes Candis Auguste MD   benzonatate (TESSALON PERLES) 100 MG capsule Take 1 capsule by mouth 3 times daily as needed for Cough 3/11/19   Ronnie Lopez DO   ferrous sulfate (MELISSA-HAO) 325 (65 Fe) MG tablet Take 1 tablet by mouth daily 2/7/19   Obed Walters MD   benzocaine-menthol (CEPACOL SORE THROAT) 15-3.6 MG lozenge Take 1 lozenge by mouth every 2 hours as needed for Sore Throat 1/11/19   Geovany Villatoro MD   guaiFENesin (MUCINEX) 600 MG extended release tablet Take 1 tablet by mouth 2 times daily 9/13/18   Laura Munguia MD      Current Facility-Administered Medications: sodium chloride flush 0.9 % injection 10 mL, 10 mL, Intravenous, 2 times per day  sodium chloride flush 0.9 % injection 10 mL, 10 mL, Intravenous, PRN  acetaminophen (TYLENOL) tablet 650 mg, 650 mg, Oral, Q4H PRN  0.9 % sodium chloride infusion, , Intravenous, Continuous    Allergies:  Aspirin; Keflet [cephalexin]; and Motrin [ibuprofen]    Social History:   reports that she quit smoking about 6 months ago. Her smoking use included cigarettes. She started smoking about 49 years ago. She has a 40.00 pack-year smoking history.  She has never used smokeless tobacco. She reports that she does not drink alcohol or use drugs. Family History: family history is not on file. No h/o sudden cardiac death. No for premature CAD    REVIEW OF SYSTEMS:    · Constitutional: tired and sleepy   · Eyes: No visual changes or diplopia. No scleral icterus. · ENT: No Headaches  · Cardiovascular:mentioned above  · Respiratory: No previous pulmonary problems, No cough  · Gastrointestinal: No abdominal pain. No change in bowel or bladder habits. · Genitourinary: No dysuria, trouble voiding, or hematuria. · Musculoskeletal:  No gait disturbance, No weakness or joint complaints. · Integumentary: No rash or pruritis. · Neurological: No headache, diplopia, change in muscle strength, numbness or tingling. No change in gait, balance, coordination, mood, affect, memory, mentation, behavior. · Psychiatric: No anxiety, or depression. · Endocrine: No temperature intolerance. No excessive thirst, fluid intake, or urination. No tremor. · Hematologic/Lymphatic: No abnormal bruising or bleeding, blood clots or swollen lymph nodes. · Allergic/Immunologic: No nasal congestion or hives. PHYSICAL EXAM:      BP (!) 158/83   Pulse 96   Temp 99.3 °F (37.4 °C) (Oral)   Resp 18   Ht 5' (1.524 m)   Wt 120 lb (54.4 kg)   LMP 03/18/2015   SpO2 99%   BMI 23.44 kg/m²    Constitutional and General Appearance: alert, cooperative, no distress and appears stated age  [de-identified]: PERRL, no cervical lymphadenopathy. No masses palpable. Normal oral mucosa  Respiratory:  · Normal excursion and expansion without use of accessory muscles  · Resp Auscultation: Good respiratory effort. No for increased work of breathing.  On auscultation: clear to auscultation bilaterally  Cardiovascular:  · The apical impulse is not displaced  · Heart tones are crisp and normal. regular S1 and S2.  · Jugular venous pulsation Normal  · The carotid upstroke is normal in amplitude and contour without delay or bruit  · Peripheral pulses are symmetrical and full   Abdomen:   · No masses or tenderness  · Bowel sounds present  Extremities:  ·  No Cyanosis or Clubbing  ·  Lower extremity edema: No  ·  Skin: Warm and dry  Neurological:  · Alert and oriented. · Moves all extremities well  · No abnormalities of mood, affect, memory, mentation, or behavior are noted    DATA:    Diagnostics:    EKG: normal EKG, normal sinus rhythm, unchanged from previous tracings. Labs:     CBC:   Recent Labs     05/12/19  0037   WBC 9.1   HGB 13.7   HCT 45.6   *     BMP:   Recent Labs     05/12/19  0037 05/12/19  0249    141   K 4.4 3.3*   CO2 22 17*   BUN 12 9   CREATININE 0.54 0.39*   LABGLOM >60 >60   GLUCOSE 110* 83     BNP: No results for input(s): BNP in the last 72 hours. PT/INR: No results for input(s): PROTIME, INR in the last 72 hours. APTT:No results for input(s): APTT in the last 72 hours. CARDIAC ENZYMES:No results for input(s): CKTOTAL, CKMB, CKMBINDEX, TROPONINI in the last 72 hours. FASTING LIPID PANEL:  Lab Results   Component Value Date    HDL 33 04/21/2019    TRIG 125 04/21/2019     LIVER PROFILE:No results for input(s): AST, ALT, LABALBU in the last 72 hours.     IMPRESSION:    Patient Active Problem List   Diagnosis    Hypertension    Hepatitis C    History of substance abuse    Chronic heart failure with reduced ejection fraction and diastolic dysfunction (HCC)    COPD (chronic obstructive pulmonary disease) (HCC)    Alcoholic cirrhosis of liver without ascites (HCC)    Acute GI hemorrhage    PUD (peptic ulcer disease)    Anemia due to gastrointestinal blood loss    Duodenal ulcer with hemorrhage    Psychosis, intermittent requring antipsychotic meds    Opiate withdrawal ([address]    Severe sepsis ([address]    Pancreas head lesion    Bacteremia    Sepsis ([address]    Acute kidney injury ([address]    Cerebrovascular accident (CVA) ([address]    Stroke-like symptoms    Chest pain     1 URI  2 Chest pain sec to cough  3 Migraine  4 Hypertension  5 H/O Polysubstance abuse    RECOMMENDATIONS:  1. Will recommend medical management per primary team.  2. Does not need ischemia work up. Will sign off on patient  3. Coreg 6.25 mg BID. Discussed with patient and Nurse.     Electronically signed by Richard Rivera MD on 5/12/2019 at 9:14 AM    Fellow cardiology

## 2019-05-13 LAB
CULTURE: NO GROWTH
Lab: NORMAL
SPECIMEN DESCRIPTION: NORMAL

## 2019-05-13 NOTE — CARE COORDINATION
Discharge [address] [facility]  Clinical Case Management Department  Written by: Chriss Costa RN    Patient Name: Kareen Parker  Attending Provider: No att. providers found  Admit Date: 2019 11:08 PM  MRN: [id number]  Account: [de-identified]                     : 1955  Discharge Date: 2019      Disposition: home with [facility], All DC papers faxed to office,     Chriss Costa RN

## 2019-05-14 LAB
EKG ATRIAL RATE: 108 BPM
EKG P AXIS: 60 DEGREES
EKG P-R INTERVAL: 140 MS
EKG Q-T INTERVAL: 330 MS
EKG QRS DURATION: 64 MS
EKG QTC CALCULATION (BAZETT): 442 MS
EKG R AXIS: 61 DEGREES
EKG T AXIS: 71 DEGREES
EKG VENTRICULAR RATE: 108 BPM

## 2019-06-19 ENCOUNTER — HOSPITAL ENCOUNTER (OUTPATIENT)
Age: 64
Setting detail: OBSERVATION
Discharge: HOME | End: 2019-06-20
Attending: EMERGENCY MEDICINE | Admitting: EMERGENCY MEDICINE
Payer: MEDICARE

## 2019-06-19 ENCOUNTER — APPOINTMENT (OUTPATIENT)
Dept: CT IMAGING | Age: 64
End: 2019-06-19
Payer: MEDICARE

## 2019-06-19 DIAGNOSIS — S72.051A CLOSED FRACTURE OF HEAD OF RIGHT FEMUR, INITIAL ENCOUNTER (HCC): Primary | ICD-10-CM

## 2019-06-19 DIAGNOSIS — K83.8 COMMON BILE DUCT DILATATION: ICD-10-CM

## 2019-06-19 PROBLEM — R10.9 ABDOMINAL PAIN: Status: ACTIVE | Noted: 2019-06-19

## 2019-06-19 LAB
-: NORMAL
-: NORMAL
ABSOLUTE EOS #: 0.12 K/UL (ref 0–0.44)
ABSOLUTE IMMATURE GRANULOCYTE: 0.06 K/UL (ref 0–0.3)
ABSOLUTE LYMPH #: 2.05 K/UL (ref 1.1–3.7)
ABSOLUTE MONO #: 0.39 K/UL (ref 0.1–1.2)
ALBUMIN SERPL-MCNC: 4.6 G/DL (ref 3.5–5.2)
ALBUMIN/GLOBULIN RATIO: 1 (ref 1–2.5)
ALP BLD-CCNC: 80 U/L (ref 35–104)
ALT SERPL-CCNC: 8 U/L (ref 5–33)
AMORPHOUS: NORMAL
ANION GAP SERPL CALCULATED.3IONS-SCNC: 20 MMOL/L (ref 9–17)
AST SERPL-CCNC: 29 U/L
BACTERIA: NORMAL
BASOPHILS # BLD: 0 % (ref 0–2)
BASOPHILS ABSOLUTE: <0.03 K/UL (ref 0–0.2)
BILIRUB SERPL-MCNC: 0.58 MG/DL (ref 0.3–1.2)
BILIRUBIN URINE: NEGATIVE
BUN BLDV-MCNC: 8 MG/DL (ref 8–23)
BUN/CREAT BLD: ABNORMAL (ref 9–20)
CALCIUM SERPL-MCNC: 10.4 MG/DL (ref 8.6–10.4)
CASTS UA: NORMAL /LPF (ref 0–8)
CHLORIDE BLD-SCNC: 100 MMOL/L (ref 98–107)
CO2: 14 MMOL/L (ref 20–31)
COLOR: YELLOW
COMMENT UA: ABNORMAL
CREAT SERPL-MCNC: 0.67 MG/DL (ref 0.5–0.9)
CRYSTALS, UA: NORMAL /HPF
DIFFERENTIAL TYPE: ABNORMAL
EOSINOPHILS RELATIVE PERCENT: 2 % (ref 1–4)
EPITHELIAL CELLS UA: NORMAL /HPF (ref 0–5)
GFR AFRICAN AMERICAN: >60 ML/MIN
GFR NON-AFRICAN AMERICAN: >60 ML/MIN
GFR SERPL CREATININE-BSD FRML MDRD: ABNORMAL ML/MIN/{1.73_M2}
GFR SERPL CREATININE-BSD FRML MDRD: ABNORMAL ML/MIN/{1.73_M2}
GLUCOSE BLD-MCNC: 74 MG/DL (ref 70–99)
GLUCOSE URINE: NEGATIVE
HCT VFR BLD CALC: 38.5 % (ref 36.3–47.1)
HEMOGLOBIN: 11.7 G/DL (ref 11.9–15.1)
IMMATURE GRANULOCYTES: 1 %
INR BLD: 1
KETONES, URINE: ABNORMAL
LACTIC ACID, WHOLE BLOOD: 0.9 MMOL/L (ref 0.7–2.1)
LEUKOCYTE ESTERASE, URINE: ABNORMAL
LIPASE: 40 U/L (ref 13–60)
LYMPHOCYTES # BLD: 31 % (ref 24–43)
MCH RBC QN AUTO: 30.7 PG (ref 25.2–33.5)
MCHC RBC AUTO-ENTMCNC: 30.4 G/DL (ref 28.4–34.8)
MCV RBC AUTO: 101 FL (ref 82.6–102.9)
MONOCYTES # BLD: 6 % (ref 3–12)
MUCUS: NORMAL
NITRITE, URINE: NEGATIVE
NRBC AUTOMATED: 0 PER 100 WBC
OTHER OBSERVATIONS UA: NORMAL
PARTIAL THROMBOPLASTIN TIME: 22.9 SEC (ref 20.5–30.5)
PDW BLD-RTO: 18.5 % (ref 11.8–14.4)
PH UA: 5 (ref 5–8)
PLATELET # BLD: ABNORMAL K/UL (ref 138–453)
PLATELET ESTIMATE: ABNORMAL
PLATELET, FLUORESCENCE: NORMAL K/UL (ref 138–453)
PMV BLD AUTO: ABNORMAL FL (ref 8.1–13.5)
POTASSIUM SERPL-SCNC: 5.2 MMOL/L (ref 3.7–5.3)
PROTEIN UA: NEGATIVE
PROTHROMBIN TIME: 10.4 SEC (ref 9–12)
RBC # BLD: 3.81 M/UL (ref 3.95–5.11)
RBC # BLD: ABNORMAL 10*6/UL
RBC UA: NORMAL /HPF (ref 0–4)
REASON FOR REJECTION: NORMAL
RENAL EPITHELIAL, UA: NORMAL /HPF
SEG NEUTROPHILS: 61 % (ref 36–65)
SEGMENTED NEUTROPHILS ABSOLUTE COUNT: 4.07 K/UL (ref 1.5–8.1)
SODIUM BLD-SCNC: 134 MMOL/L (ref 135–144)
SPECIFIC GRAVITY UA: 1.05 (ref 1–1.03)
TOTAL PROTEIN: 9.3 G/DL (ref 6.4–8.3)
TRICHOMONAS: NORMAL
TURBIDITY: CLEAR
URINE HGB: NEGATIVE
UROBILINOGEN, URINE: NORMAL
WBC # BLD: 6.7 K/UL (ref 3.5–11.3)
WBC # BLD: ABNORMAL 10*3/UL
WBC UA: NORMAL /HPF (ref 0–5)
YEAST: NORMAL
ZZ NTE CLEAN UP: ORDERED TEST: NORMAL
ZZ NTE WITH NAME CLEAN UP: SPECIMEN SOURCE: NORMAL

## 2019-06-19 PROCEDURE — 6360000002 HC RX W HCPCS: Performed by: EMERGENCY MEDICINE

## 2019-06-19 PROCEDURE — 74177 CT ABD & PELVIS W/CONTRAST: CPT

## 2019-06-19 PROCEDURE — 96361 HYDRATE IV INFUSION ADD-ON: CPT

## 2019-06-19 PROCEDURE — 87040 BLOOD CULTURE FOR BACTERIA: CPT

## 2019-06-19 PROCEDURE — 2580000003 HC RX 258: Performed by: EMERGENCY MEDICINE

## 2019-06-19 PROCEDURE — 96374 THER/PROPH/DIAG INJ IV PUSH: CPT

## 2019-06-19 PROCEDURE — G0378 HOSPITAL OBSERVATION PER HR: HCPCS

## 2019-06-19 PROCEDURE — 85055 RETICULATED PLATELET ASSAY: CPT

## 2019-06-19 PROCEDURE — 96375 TX/PRO/DX INJ NEW DRUG ADDON: CPT

## 2019-06-19 PROCEDURE — 81001 URINALYSIS AUTO W/SCOPE: CPT

## 2019-06-19 PROCEDURE — 85025 COMPLETE CBC W/AUTO DIFF WBC: CPT

## 2019-06-19 PROCEDURE — 85730 THROMBOPLASTIN TIME PARTIAL: CPT

## 2019-06-19 PROCEDURE — 83605 ASSAY OF LACTIC ACID: CPT

## 2019-06-19 PROCEDURE — 6360000004 HC RX CONTRAST MEDICATION: Performed by: EMERGENCY MEDICINE

## 2019-06-19 PROCEDURE — 80053 COMPREHEN METABOLIC PANEL: CPT

## 2019-06-19 PROCEDURE — 99285 EMERGENCY DEPT VISIT HI MDM: CPT

## 2019-06-19 PROCEDURE — 83690 ASSAY OF LIPASE: CPT

## 2019-06-19 PROCEDURE — 85610 PROTHROMBIN TIME: CPT

## 2019-06-19 PROCEDURE — 87086 URINE CULTURE/COLONY COUNT: CPT

## 2019-06-19 RX ORDER — 0.9 % SODIUM CHLORIDE 0.9 %
1000 INTRAVENOUS SOLUTION INTRAVENOUS ONCE
Status: DISCONTINUED | OUTPATIENT
Start: 2019-06-19 | End: 2019-06-20 | Stop reason: HOSPADM

## 2019-06-19 RX ORDER — DOCUSATE SODIUM 100 MG/1
100 CAPSULE, LIQUID FILLED ORAL 2 TIMES DAILY
Status: DISCONTINUED | OUTPATIENT
Start: 2019-06-19 | End: 2019-06-20 | Stop reason: HOSPADM

## 2019-06-19 RX ORDER — SODIUM CHLORIDE 0.9 % (FLUSH) 0.9 %
10 SYRINGE (ML) INJECTION EVERY 12 HOURS SCHEDULED
Status: DISCONTINUED | OUTPATIENT
Start: 2019-06-19 | End: 2019-06-20 | Stop reason: HOSPADM

## 2019-06-19 RX ORDER — GABAPENTIN 300 MG/1
300 CAPSULE ORAL 3 TIMES DAILY
Status: DISCONTINUED | OUTPATIENT
Start: 2019-06-19 | End: 2019-06-20 | Stop reason: HOSPADM

## 2019-06-19 RX ORDER — FENTANYL CITRATE 50 UG/ML
25 INJECTION, SOLUTION INTRAMUSCULAR; INTRAVENOUS
Status: DISCONTINUED | OUTPATIENT
Start: 2019-06-19 | End: 2019-06-20

## 2019-06-19 RX ORDER — ONDANSETRON 2 MG/ML
4 INJECTION INTRAMUSCULAR; INTRAVENOUS ONCE
Status: COMPLETED | OUTPATIENT
Start: 2019-06-19 | End: 2019-06-19

## 2019-06-19 RX ORDER — ACETAMINOPHEN 325 MG/1
650 TABLET ORAL EVERY 4 HOURS PRN
Status: DISCONTINUED | OUTPATIENT
Start: 2019-06-19 | End: 2019-06-20 | Stop reason: HOSPADM

## 2019-06-19 RX ORDER — FENTANYL CITRATE 50 UG/ML
50 INJECTION, SOLUTION INTRAMUSCULAR; INTRAVENOUS
Status: DISCONTINUED | OUTPATIENT
Start: 2019-06-19 | End: 2019-06-20

## 2019-06-19 RX ORDER — CARVEDILOL 6.25 MG/1
6.25 TABLET ORAL 2 TIMES DAILY WITH MEALS
Status: DISCONTINUED | OUTPATIENT
Start: 2019-06-20 | End: 2019-06-20 | Stop reason: HOSPADM

## 2019-06-19 RX ORDER — SODIUM CHLORIDE 0.9 % (FLUSH) 0.9 %
10 SYRINGE (ML) INJECTION PRN
Status: DISCONTINUED | OUTPATIENT
Start: 2019-06-19 | End: 2019-06-20 | Stop reason: HOSPADM

## 2019-06-19 RX ORDER — PANTOPRAZOLE SODIUM 40 MG/1
40 TABLET, DELAYED RELEASE ORAL
Status: DISCONTINUED | OUTPATIENT
Start: 2019-06-20 | End: 2019-06-20 | Stop reason: HOSPADM

## 2019-06-19 RX ORDER — SODIUM CHLORIDE 9 MG/ML
INJECTION, SOLUTION INTRAVENOUS CONTINUOUS
Status: DISCONTINUED | OUTPATIENT
Start: 2019-06-19 | End: 2019-06-20 | Stop reason: HOSPADM

## 2019-06-19 RX ORDER — MORPHINE SULFATE 4 MG/ML
4 INJECTION, SOLUTION INTRAMUSCULAR; INTRAVENOUS ONCE
Status: COMPLETED | OUTPATIENT
Start: 2019-06-19 | End: 2019-06-19

## 2019-06-19 RX ORDER — 0.9 % SODIUM CHLORIDE 0.9 %
1000 INTRAVENOUS SOLUTION INTRAVENOUS ONCE
Status: COMPLETED | OUTPATIENT
Start: 2019-06-19 | End: 2019-06-19

## 2019-06-19 RX ORDER — UREA 10 %
1 LOTION (ML) TOPICAL NIGHTLY PRN
Status: DISCONTINUED | OUTPATIENT
Start: 2019-06-20 | End: 2019-06-20 | Stop reason: HOSPADM

## 2019-06-19 RX ADMIN — FENTANYL CITRATE 25 MCG: 50 INJECTION INTRAMUSCULAR; INTRAVENOUS at 23:26

## 2019-06-19 RX ADMIN — IOPAMIDOL 75 ML: 755 INJECTION, SOLUTION INTRAVENOUS at 21:53

## 2019-06-19 RX ADMIN — SODIUM CHLORIDE 1000 ML: 9 INJECTION, SOLUTION INTRAVENOUS at 21:26

## 2019-06-19 RX ADMIN — Medication 10 ML: at 23:22

## 2019-06-19 RX ADMIN — SODIUM CHLORIDE: 9 INJECTION, SOLUTION INTRAVENOUS at 23:21

## 2019-06-19 RX ADMIN — ONDANSETRON 4 MG: 2 INJECTION INTRAMUSCULAR; INTRAVENOUS at 21:26

## 2019-06-19 RX ADMIN — MORPHINE SULFATE 4 MG: 4 INJECTION INTRAVENOUS at 21:26

## 2019-06-19 ASSESSMENT — ENCOUNTER SYMPTOMS
COUGH: 0
BACK PAIN: 0
EYE REDNESS: 0
VOMITING: 0
WHEEZING: 0
DIARRHEA: 0
SORE THROAT: 0
NAUSEA: 1
TROUBLE SWALLOWING: 0
ABDOMINAL PAIN: 1
PHOTOPHOBIA: 0
SHORTNESS OF BREATH: 0
CHEST TIGHTNESS: 0
BLOOD IN STOOL: 0

## 2019-06-19 ASSESSMENT — PAIN DESCRIPTION - LOCATION
LOCATION: ABDOMEN;HIP
LOCATION: ABDOMEN

## 2019-06-19 ASSESSMENT — PAIN DESCRIPTION - FREQUENCY: FREQUENCY: CONTINUOUS

## 2019-06-19 ASSESSMENT — PAIN SCALES - GENERAL
PAINLEVEL_OUTOF10: 7
PAINLEVEL_OUTOF10: 7
PAINLEVEL_OUTOF10: 9
PAINLEVEL_OUTOF10: 8

## 2019-06-19 ASSESSMENT — PAIN DESCRIPTION - ORIENTATION
ORIENTATION: RIGHT
ORIENTATION: LOWER;RIGHT

## 2019-06-19 ASSESSMENT — PAIN DESCRIPTION - PAIN TYPE: TYPE: ACUTE PAIN

## 2019-06-19 ASSESSMENT — PAIN DESCRIPTION - PROGRESSION: CLINICAL_PROGRESSION: GRADUALLY WORSENING

## 2019-06-19 ASSESSMENT — PAIN - FUNCTIONAL ASSESSMENT: PAIN_FUNCTIONAL_ASSESSMENT: PREVENTS OR INTERFERES SOME ACTIVE ACTIVITIES AND ADLS

## 2019-06-19 ASSESSMENT — PAIN DESCRIPTION - ONSET: ONSET: ON-GOING

## 2019-06-19 ASSESSMENT — PAIN DESCRIPTION - DESCRIPTORS: DESCRIPTORS: DISCOMFORT;SHARP;SHOOTING

## 2019-06-20 ENCOUNTER — APPOINTMENT (OUTPATIENT)
Dept: GENERAL RADIOLOGY | Age: 64
End: 2019-06-20
Payer: MEDICARE

## 2019-06-20 ENCOUNTER — APPOINTMENT (OUTPATIENT)
Dept: MRI IMAGING | Age: 64
End: 2019-06-20
Payer: MEDICARE

## 2019-06-20 VITALS
HEART RATE: 92 BPM | BODY MASS INDEX: 20.65 KG/M2 | HEIGHT: 60 IN | OXYGEN SATURATION: 94 % | SYSTOLIC BLOOD PRESSURE: 131 MMHG | WEIGHT: 105.2 LBS | DIASTOLIC BLOOD PRESSURE: 79 MMHG | TEMPERATURE: 98.6 F | RESPIRATION RATE: 18 BRPM

## 2019-06-20 LAB
ANION GAP SERPL CALCULATED.3IONS-SCNC: 14 MMOL/L (ref 9–17)
BUN BLDV-MCNC: 6 MG/DL (ref 8–23)
BUN/CREAT BLD: ABNORMAL (ref 9–20)
CALCIUM SERPL-MCNC: 8.5 MG/DL (ref 8.6–10.4)
CHLORIDE BLD-SCNC: 111 MMOL/L (ref 98–107)
CO2: 17 MMOL/L (ref 20–31)
CREAT SERPL-MCNC: 0.68 MG/DL (ref 0.5–0.9)
GFR AFRICAN AMERICAN: >60 ML/MIN
GFR NON-AFRICAN AMERICAN: >60 ML/MIN
GFR SERPL CREATININE-BSD FRML MDRD: ABNORMAL ML/MIN/{1.73_M2}
GFR SERPL CREATININE-BSD FRML MDRD: ABNORMAL ML/MIN/{1.73_M2}
GLUCOSE BLD-MCNC: 74 MG/DL (ref 70–99)
POTASSIUM SERPL-SCNC: 4 MMOL/L (ref 3.7–5.3)
SODIUM BLD-SCNC: 142 MMOL/L (ref 135–144)
TROPONIN INTERP: NORMAL
TROPONIN T: NORMAL NG/ML
TROPONIN, HIGH SENSITIVITY: 11 NG/L (ref 0–14)

## 2019-06-20 PROCEDURE — 80048 BASIC METABOLIC PNL TOTAL CA: CPT

## 2019-06-20 PROCEDURE — 96375 TX/PRO/DX INJ NEW DRUG ADDON: CPT

## 2019-06-20 PROCEDURE — 73502 X-RAY EXAM HIP UNI 2-3 VIEWS: CPT

## 2019-06-20 PROCEDURE — G0378 HOSPITAL OBSERVATION PER HR: HCPCS

## 2019-06-20 PROCEDURE — 74181 MRI ABDOMEN W/O CONTRAST: CPT

## 2019-06-20 PROCEDURE — 94640 AIRWAY INHALATION TREATMENT: CPT

## 2019-06-20 PROCEDURE — 6370000000 HC RX 637 (ALT 250 FOR IP): Performed by: EMERGENCY MEDICINE

## 2019-06-20 PROCEDURE — 99253 IP/OBS CNSLTJ NEW/EST LOW 45: CPT | Performed by: INTERNAL MEDICINE

## 2019-06-20 PROCEDURE — 6360000002 HC RX W HCPCS: Performed by: STUDENT IN AN ORGANIZED HEALTH CARE EDUCATION/TRAINING PROGRAM

## 2019-06-20 PROCEDURE — 96361 HYDRATE IV INFUSION ADD-ON: CPT

## 2019-06-20 PROCEDURE — 71045 X-RAY EXAM CHEST 1 VIEW: CPT

## 2019-06-20 PROCEDURE — 6360000002 HC RX W HCPCS: Performed by: EMERGENCY MEDICINE

## 2019-06-20 PROCEDURE — 96376 TX/PRO/DX INJ SAME DRUG ADON: CPT

## 2019-06-20 PROCEDURE — 97166 OT EVAL MOD COMPLEX 45 MIN: CPT

## 2019-06-20 PROCEDURE — 36415 COLL VENOUS BLD VENIPUNCTURE: CPT

## 2019-06-20 PROCEDURE — 84484 ASSAY OF TROPONIN QUANT: CPT

## 2019-06-20 PROCEDURE — 97530 THERAPEUTIC ACTIVITIES: CPT

## 2019-06-20 PROCEDURE — 97162 PT EVAL MOD COMPLEX 30 MIN: CPT

## 2019-06-20 PROCEDURE — 6370000000 HC RX 637 (ALT 250 FOR IP): Performed by: STUDENT IN AN ORGANIZED HEALTH CARE EDUCATION/TRAINING PROGRAM

## 2019-06-20 PROCEDURE — 93005 ELECTROCARDIOGRAM TRACING: CPT | Performed by: STUDENT IN AN ORGANIZED HEALTH CARE EDUCATION/TRAINING PROGRAM

## 2019-06-20 PROCEDURE — 2580000003 HC RX 258: Performed by: EMERGENCY MEDICINE

## 2019-06-20 RX ORDER — OXYCODONE HYDROCHLORIDE 5 MG/1
5 TABLET ORAL EVERY 4 HOURS PRN
Status: DISCONTINUED | OUTPATIENT
Start: 2019-06-20 | End: 2019-06-20 | Stop reason: HOSPADM

## 2019-06-20 RX ORDER — LORAZEPAM 2 MG/ML
1 INJECTION INTRAMUSCULAR ONCE
Status: COMPLETED | OUTPATIENT
Start: 2019-06-20 | End: 2019-06-20

## 2019-06-20 RX ADMIN — Medication 10 ML: at 06:50

## 2019-06-20 RX ADMIN — DOCUSATE SODIUM 100 MG: 100 CAPSULE, LIQUID FILLED ORAL at 09:36

## 2019-06-20 RX ADMIN — MOMETASONE FUROATE AND FORMOTEROL FUMARATE DIHYDRATE 2 PUFF: 100; 5 AEROSOL RESPIRATORY (INHALATION) at 09:00

## 2019-06-20 RX ADMIN — FENTANYL CITRATE 50 MCG: 50 INJECTION INTRAMUSCULAR; INTRAVENOUS at 06:50

## 2019-06-20 RX ADMIN — FENTANYL CITRATE 50 MCG: 50 INJECTION INTRAMUSCULAR; INTRAVENOUS at 09:36

## 2019-06-20 RX ADMIN — Medication 10 ML: at 02:38

## 2019-06-20 RX ADMIN — GABAPENTIN 300 MG: 300 CAPSULE ORAL at 14:57

## 2019-06-20 RX ADMIN — Medication 10 ML: at 09:36

## 2019-06-20 RX ADMIN — CARVEDILOL 6.25 MG: 6.25 TABLET, FILM COATED ORAL at 09:36

## 2019-06-20 RX ADMIN — GABAPENTIN 300 MG: 300 CAPSULE ORAL at 09:36

## 2019-06-20 RX ADMIN — FENTANYL CITRATE 25 MCG: 50 INJECTION INTRAMUSCULAR; INTRAVENOUS at 02:38

## 2019-06-20 RX ADMIN — LORAZEPAM 1 MG: 2 INJECTION INTRAMUSCULAR; INTRAVENOUS at 15:21

## 2019-06-20 RX ADMIN — OXYCODONE HYDROCHLORIDE 5 MG: 5 TABLET ORAL at 14:57

## 2019-06-20 RX ADMIN — PANTOPRAZOLE SODIUM 40 MG: 40 TABLET, DELAYED RELEASE ORAL at 09:36

## 2019-06-20 RX ADMIN — GABAPENTIN 300 MG: 300 CAPSULE ORAL at 00:38

## 2019-06-20 ASSESSMENT — PAIN DESCRIPTION - LOCATION: LOCATION: HIP

## 2019-06-20 ASSESSMENT — PAIN SCALES - GENERAL
PAINLEVEL_OUTOF10: 7
PAINLEVEL_OUTOF10: 7
PAINLEVEL_OUTOF10: 0
PAINLEVEL_OUTOF10: 7
PAINLEVEL_OUTOF10: 8
PAINLEVEL_OUTOF10: 7

## 2019-06-20 ASSESSMENT — PAIN DESCRIPTION - ORIENTATION: ORIENTATION: RIGHT

## 2019-06-20 NOTE — CONSULTS
COLONOSCOPY WITH BIOPSY performed by Liset Morgan MD at [facility]    MD EGD TRANSORAL BIOPSY SINGLE/MULTIPLE N/A 11/9/2018    EGD ESOPHAGOGASTRODUODENOSCOPY performed by Bailey Faith MD at [address] [place] [address]  2008    oesophagitis, candidiasis    UPPER GASTROINTESTINAL ENDOSCOPY  11/05/2018    UPPER GASTROINTESTINAL ENDOSCOPY N/A 11/5/2018    EGD CONTROL HEMORRHAGE performed by Bailey Faith MD at [place] [address]     Medications Prior to Admission:   Prior to Admission medications    Medication Sig Start Date End Date Taking? Authorizing Provider   benzonatate (TESSALON PERLES) 100 MG capsule Take 1 capsule by mouth 3 times daily as needed for Cough 3/11/19  Yes Ramin Lopez DO   carvedilol (COREG) 6.25 MG tablet Take 1 tablet by mouth 2 times daily (with meals) 2/7/19  Yes Ingrid Carrasquillo MD   ferrous sulfate (MELISSA-HAO) 325 (65 Fe) MG tablet Take 1 tablet by mouth daily 2/7/19  Yes Ingrid Carrasquillo MD   pantoprazole (PROTONIX) 40 MG tablet Take 1 tablet by mouth 2 times daily (before meals) 11/13/18  Yes Anya Jefferson MD   folic acid (FOLVITE) 1 MG tablet Take 1 tablet by mouth daily 10/13/18  Yes Andreina Lopez MD   vitamin B-12 250 MCG tablet Take 1 tablet by mouth daily 10/13/18  Yes Andreina Lopez MD   gabapentin (NEURONTIN) 300 MG capsule Take 300 mg by mouth 3 times daily. .   Yes Historical Provider, MD   buprenorphine-naloxone (SUBOXONE) 12-3 MG sublingual film Place 1 Film under the tongue daily. .   Yes Historical Provider, MD   guaiFENesin (MUCINEX) 600 MG extended release tablet Take 1 tablet by mouth 2 times daily 9/13/18  Yes Hunter Villarreal MD   melatonin 1 MG tablet Take 1 tablet by mouth nightly as needed for Sleep 1/11/19   Fannie Muhammad MD   ipratropium-albuterol (DUONEB) 0.5-2.5 (3) MG/3ML SOLN nebulizer solution Inhale 3 mLs into the lungs every 4 hours as needed for Shortness of Breath 1/11/19   Fannie Muhammad MD   benzocaine-menthol (CEPACOL SORE THROAT) 15-3.6 MG lozenge Take 1 lozenge by mouth every 2 hours as needed for Sore Throat 19   Richie Apgar, MD   mometasone-formoterol [facility]) 100-5 MCG/ACT inhaler Inhale 2 puffs into the lungs 2 times daily 18   Ezekiel Limon MD   albuterol sulfate HFA (PROVENTIL HFA) 108 (90 Base) MCG/ACT inhaler Inhale 1-2 puffs into the lungs every 4 hours as needed for Wheezing or Shortness of Breath (Space out to every 6 hours as symptoms improve) 18   Ezekiel Limon MD   vitamin B-1 100 MG tablet Take 1 tablet by mouth daily 10/13/18   Krissy Armstrong MD   Multiple Vitamins-Minerals (THERAPEUTIC MULTIVITAMIN-MINERALS) tablet Take 1 tablet by mouth daily 18   Sesar Richard MD     Allergies:    Aspirin; María Elena Moron [cephalexin]; and Motrin [ibuprofen]    Social History:   Social History     Socioeconomic History    Marital status: Single     Spouse name: Not on file    Number of children: Not on file    Years of education: Not on file    Highest education level: Not on file   Occupational History    Not on file   Social Needs    Financial resource strain: Not on file    Food insecurity:     Worry: Not on file     Inability: Not on file    Transportation needs:     Medical: Not on file     Non-medical: Not on file   Tobacco Use    Smoking status: Former Smoker     Packs/day: 1.00     Years: 40.00     Pack years: 40.00     Types: Cigarettes     Start date:      Last attempt to quit: 2018     Years since quittin.6    Smokeless tobacco: Never Used   Substance and Sexual Activity    Alcohol use: No     Alcohol/week: 0.0 oz    Drug use: No     Types: IV, Cocaine     Comment: past history    Sexual activity: Not on file   Lifestyle    Physical activity:     Days per week: Not on file     Minutes per session: Not on file    Stress: Not on file   Relationships    Social connections:     Talks on phone: Not on file     Gets together: Not on file     Attends Yazidi service: Not on file     Active member of club or organization: Not on file     Attends meetings of clubs or organizations: Not on file     Relationship status: Not on file    Intimate partner violence:     Fear of current or ex partner: Not on file     Emotionally abused: Not on file     Physically abused: Not on file     Forced sexual activity: Not on file   Other Topics Concern    Not on file   Social History Narrative    Not on file     Family History:  No family history on file. REVIEW OF SYSTEMS:   Constitutional: Negative for fever and chills. HENT: Negative for congestion. Eyes: Negative for blurred vision and double vision. Respiratory: Negative for cough, shortness of breath and wheezing. Cardiovascular: Negative for chest pain and palpitations. Gastrointestinal: Negative for nausea. Negative for vomiting. Musculoskeletal: Positive for myalgias and joint pain right hip. Skin: Negative for itching and rash. Neurological: Negative for dizziness, sensory change and headaches. Psychiatric/Behavioral: Negative for depression and suicidal ideas. PHYSICAL EXAM:  Blood pressure (!) 140/89, pulse 88, temperature 97.8 °F (36.6 °C), temperature source Oral, resp. rate 18, height 5' (1.524 m), weight 105 lb 3.2 oz (47.7 kg), last menstrual period 03/18/2015, SpO2 96 %. Gen: alert and oriented, NAD, cooperative    Head: normocephalic atraumatic     Neck: supple    Chest: Non labored breathing. Cardiovascular: Regular rate, no dependent edema, distal pulses 2+    Respiratory: Chest symmetric, no accessory muscle use, normal respirations    RLE: No ecchymoses, abrasions, deformity, or lacerations. Skin intact. Non tender to palpation. Compartments soft and compressible. EHL/FHL/TA/GS complex motor intact. Sural, saphenous, superificial/deep peroneal, and plantar nerve distribution SILT. Dorsalis pedis pulse 2+ with BCR. Log roll positive for pain in the right groin.  Patient cannot perform a

## 2019-06-20 NOTE — PROGRESS NOTES
[facility]  CDU / OBSERVATION eNCOUnter  Attending NOte       I performed a history and physical examination of the patient and discussed management with the resident. I reviewed the residents note and agree with the documented findings and plan of care. Any areas of disagreement are noted on the chart. I was personally present for the key portions of any procedures. I have documented in the chart those procedures where I was not present during the key portions. I have reviewed the nurses notes. I agree with the chief complaint, past medical history, past surgical history, allergies, medications, social and family history as documented unless otherwise noted below. The Family history, social history, and ROS are effectively unchanged since admission unless noted elsewhere in the chart. Pt admitted for avascular necrosis of hip and dilation of the common bile duct. Ortho saw pt and has no plans for intervention at this time. PT to see pt. GI also consulted and awaiting their recommendations.     Alexandra John MD  Attending Emergency  Physician

## 2019-06-20 NOTE — ED NOTES
Cara Middleton, paramedic at bedside with ultrasound to get IV       Sujatha Allen RN  06/19/19 [id number]

## 2019-06-20 NOTE — H&P
[address] [facility]  CDU / OBSERVATION eNCOUnter  Resident Note     Pt Name: Fadumo Romo  MRN: [id number]  [place] 1955  Date of evaluation: 19  Patient's PCP is : No primary care provider on file. CHIEF COMPLAINT       Chief Complaint   Patient presents with    Abdominal Pain         HISTORY OF PRESENT ILLNESS    Emi Daniels is a 75 y.o. female who presents with complaints of abdominal pain. Patient describes it as located in bilateral lower quadrants, nonradiating, sharp, with no associated nausea, vomiting, diarrhea, fever, chills, dysuria, hematuria, vaginal bleeding, vaginal discharge. Patient has a past surgical history of appendectomy and prior . This morning on evaluation patient describes some right upper quadrant abdominal pain, which is nonradiating, intermittent and has been ongoing for the last week. Patient also states that she has had a productive cough with yellow sputum. She denies shortness of breath, fever, chills, lower extremity swelling, calf pain, history of DVT/PE, recent surgery, immobilization, hormone replacement, hemoptysis, active malignancy. The abdomen pelvis in the emergency department showed collapse of right femoral head, possibly due to avascular necrosis and associated fracture collapse. Nonspecific distention of the common bile duct without a stone and minimal intrahepatic duct dilation. Ortho was consulted and x-ray of the pelvis and hip redemonstrated right proximal femoral avascular necrosis with mildly progressed femoral head collapse compared to prior done in 2019.  Patient was admitted to the observation unit for Ortho, GI, and PT/OT evaluation      REVIEW OF SYSTEMS       General ROS - No fevers, No malaise   Ophthalmic ROS - No discharge, No changes in vision  ENT ROS -  No sore throat, No rhinorrhea,   Respiratory ROS - no shortness of breath, cough, no  wheezing  Cardiovascular ROS - No chest pain, no dyspnea on exertion  Gastrointestinal ROS - abdominal pain, no nausea or vomiting, no change in bowel habits, no black or bloody stools  Genito-Urinary ROS - No dysuria, trouble voiding, or hematuria  Musculoskeletal ROS - No myalgias, No arthalgias  Neurological ROS - No headache, no dizziness/lightheadedness, No focal weakness, no loss of sensation  Dermatological ROS - No lesions, No rash     (PQRS) Advance directives on face sheet per hospital policy. No change unless specifically mentioned in chart    [address]    has a past medical history of Appendicitis, CHF (congestive heart failure) ([address] Chronic respiratory failure with hypoxia and hypercapnia ([address] Chronic rhinitis, Cigarette smoker, COPD (chronic obstructive pulmonary disease) ([address] Depression, Dysphagia, Essential hypertension, GERD (gastroesophageal reflux disease), Heart failure, diastolic, with acute decompensation ([address] Hepatitis C, chronic ([address] History of smoking at least 1 pack per day for at least 30 years, Hyperlipidemia, Hypertension, Migraine, Moderate COPD (chronic obstructive pulmonary disease) ([address] Multiple transfusions, Osteoarthritis, Pneumonia, Scoliosis, and Substance abuse ([address] I have reviewed the past medical history with the patient and it is pertinent to this complaint. SURGICAL HISTORY      has a past surgical history that includes  section; LEEP (); Upper gastrointestinal endoscopy (); Gastric bypass surgery; Appendectomy (2017); laparoscopic appendectomy (N/A, 3/27/2017); Upper gastrointestinal endoscopy (2018); Upper gastrointestinal endoscopy (N/A, 2018); pr colonoscopy w/biopsy single/multiple (N/A, 11/10/2018); and pr egd transoral biopsy single/multiple (N/A, 2018). I have reviewed and agree with Surgical History entered and it is pertinent to this complaint.      CURRENT MEDICATIONS       0.9 % sodium chloride bolus Once   carvedilol (COREG) tablet 6.25 mg BID WC minimally tender to palpation in right upper quadrant, right lower quadrant and left lower quadrant, non-distended with normal active bowel sounds, negative Tompkins sign, no guarding, rigidity, rebound tenderness   NEUROLOGIC:  MAEx4, no focal sensory or motor deficits   MUSCULOSKELETAL: no clubbing, cyanosis or edema   SKIN: no rash or wounds       DIFFERENTIAL DIAGNOSIS/MDM:   Avascular necrosis, fracture, malignancy, ACS    DIAGNOSTIC RESULTS     EKG: All EKG's are interpreted by the Observation Physician who either signs or Co-signs this chart in the absence of a cardiologist.    EKG Interpretation    Interpreted by observation physician    Rhythm: normal sinus   Rate: normal  Axis: normal  Ectopy: none  Conduction: normal  ST Segments: no acute change  T Waves: inversion in  v1 and v2  Q Waves: none    Clinical Impression: non-specific EKG, wave inversions in V1 and V2 similar when compared to EKG on 5/11/2019    Cesar Larson DO        RADIOLOGY:   I directly visualized the following  images and reviewed the radiologist interpretations:    Ct Abdomen Pelvis W Iv Contrast Additional Contrast? None    Result Date: 6/19/2019  EXAMINATION: CT OF THE ABDOMEN AND PELVIS WITH CONTRAST 6/19/2019 9:46 pm TECHNIQUE: CT of the abdomen and pelvis was performed with the administration of intravenous contrast. Multiplanar reformatted images are provided for review. Dose modulation, iterative reconstruction, and/or weight based adjustment of the mA/kV was utilized to reduce the radiation dose to as low as reasonably achievable. COMPARISON: January 7. HISTORY: ORDERING SYSTEM PROVIDED HISTORY: abdominal pain TECHNOLOGIST PROVIDED HISTORY: Ordering Physician Provided Reason for Exam: ABD PAIN Acuity: Unknown Type of Exam: Unknown Relevant Medical/Surgical History: HTN,COPD,CHF,APPY,,GASTRIC BYPASS FINDINGS: Lower Chest: Small pericardial lymph nodes are again noted. Dependent atelectasis is noted bilaterally.  Organs: Low-density in the anterior aspect of the liver near the falciform ligament is a normal variant in liver attenuation/enhancement. There is a small amount of questionable low density adjacent to the gallbladder fossa. Developing fatty infiltration is favored. Intrahepatic ductal dilatation is noted. There is no gallstone. Mild common duct prominence is noted. There is no stone in the duct. Spleen is small without focal lesion. Pancreatic ductal prominence is noted. There is no focal pancreatic lesion. Adrenal glands are normal.  Multiple high density foci are noted in both kidneys most likely contrast.  Small calculi cannot be excluded. There is no hydronephrosis. Renal scarring in the upper pole on the left is noted. GI/Bowel: Stool is noted in the colon. Colonic redundancy is noted. There is no adjacent inflammatory change. No disproportionate small-bowel distention is noted. There is no pneumatosis. Pelvis: Mild heterogeneity of the uterus is noted. This raises the question of fibroids. No focal abnormality of the urinary bladder is noted. Redundancy of the distal colon is noted. Proximal sigmoid colon is incompletely distended. Redundancy of the sigmoid colon is noted. Appendix is at the upper limits of normal in caliber measuring approximately 7 mm transverse. There is no adjacent inflammatory change. Peritoneum/Retroperitoneum: Vascular calcifications are noted. No adenopathy is noted. Mesenteric vessels are patent. Bones/Soft Tissues: New heterogeneous low-density is noted adjacent to the margins of the right hip joint. No enlarged inguinal lymph nodes are noted. Canal detail in the lumbar region is limited. A few small disc bulges are noted in the lower lumbar spine. Sclerotic density in the left femoral head raises the question of avascular necrosis. There is new collapse involving the right femoral head.   There is heterogeneous sclerosis in the proximal femur at the level of the lesser trochanter, unchanged. Scattered small round lucent foci are noted in the lumbar spine. These are similar compared to the prior. New collapse of the right femoral head with adjacent heterogeneous low-density in the tissues. This may be due to avascular necrosis and associated fracture collapse. Septic joint would be difficult to exclude. Findings in the left hip raise the question of avascular necrosis without collapse. There may be a tiny curvilinear subcapital fracture on coronal images 49 and 50. Nonspecific distention of the common duct without stone. Minimal intrahepatic ductal dilatation is noted. Incomplete sigmoid distention. This may be due to simple underdistention. Mild colitis would be difficult to exclude although there is no inflammatory change Heterogeneity of the uterus could be due to timing of the bolus or fibroid. Xr Hip 2-3 Vw W Pelvis Right    Result Date: 6/20/2019  EXAMINATION: ONE XRAY VIEW OF THE PELVIS AND TWO XRAY VIEWS RIGHT HIP 6/20/2019 2:42 am COMPARISON: April 21, 2019. HISTORY: ORDERING SYSTEM PROVIDED HISTORY: Right hip AVN TECHNOLOGIST PROVIDED HISTORY: AP and cross-table lateral of the hip please, thank you Right hip AVN Ordering Physician Provided Reason for Exam: Right hip pain. Acuity: Acute Type of Exam: Unknown FINDINGS: Frontal view of the pelvis as well as frontal and cross-table lateral views of the right hip. Redemonstration of findings of right proximal femoral avascular necrosis with mildly progressed femoral head collapse compared to radiographs done April 21, 2019. Similar appearing osseous sclerosis in the left femoral head without significant collapse. Stable patchy sclerosis in the right proximal femoral intertrochanteric region. Right greater than left hip degenerative osseous changes. Excreted contrast material in the urinary bladder. Osteopenia. Normal alignment of the symphysis pubis and bilateral sacroiliac joints.   Incompletely characterized lumbar spondylosis. Redemonstration of findings of right proximal femoral avascular necrosis with mildly progressed femoral head collapse compared to radiographs done April 21, 2019. Similar-appearing osseous sclerosis in the left femoral head without significant collapse. LABS:  I have reviewed and interpreted all available lab results.   Labs Reviewed   CBC WITH AUTO DIFFERENTIAL - Abnormal; Notable for the following components:       Result Value    RBC 3.81 (*)     Hemoglobin 11.7 (*)     RDW 18.5 (*)     Immature Granulocytes 1 (*)     All other components within normal limits   COMPREHENSIVE METABOLIC PANEL - Abnormal; Notable for the following components:    Sodium 134 (*)     CO2 14 (*)     Anion Gap 20 (*)     Total Protein 9.3 (*)     All other components within normal limits   URINE RT REFLEX TO CULTURE - Abnormal; Notable for the following components:    Ketones, Urine MODERATE (*)     Specific Gravity, UA 1.046 (*)     Leukocyte Esterase, Urine SMALL (*)     All other components within normal limits   CULTURE BLOOD #1   CULTURE BLOOD #1   URINE CULTURE   LIPASE   PROTIME-INR   APTT   IMMATURE PLATELET FRACTION   SPECIMEN REJECTION   LACTIC ACID, WHOLE BLOOD   MICROSCOPIC URINALYSIS   PREVIOUS SPECIMEN   PREVIOUS SPECIMEN   TROPONIN       SCREENING TOOLS:    HEART Risk Score for Chest Pain Patients   History and Physical Exam Suspicion Level  (Nausea, Vomiting, Diaphoresis, Radiation, Exertion)   Slightly Suspicious (0 pts)   Moderately Suspicious (1 pt)   Highly Suspicious (2 pts)   EKG Interpretation   Normal (0 pts)   Non-Specific Repolarization Disturbance (1 pt)   Significant ST-Depression (2 pts)   Age of Patient (in years)   = 39 (0 pts)   46-64 (1 pt)   = 65 (2 pts)   Risk Factors   No Risk Factors (0 pts)   1-2 Risk Factors (1 pt)   = 3 Risk Factors (2 pts)   Risk Factors Include:   Hypercholesterolemia   Hypertension   Diabetes Mellitus   Cigarette smoking   Positive errors in the transcription that are not intended.

## 2019-06-20 NOTE — ED NOTES
Pt came into ER in NAD. Pt reports abdominal pain x1 week. Pt states she vomited once last week. Pt states nausea right now. Pt reports right \"bone\" pain. Pt states the right \"bone\" pain is making her unable to walk. Pt reports pain is 9/10. Pt resting in bed in NAD with even and unlabored rr.  Will continue to assess      Mary Ann Leach RN  06/19/19 2025

## 2019-06-20 NOTE — CONSULTS
with acute decompensation ([facility] 2018    Hepatitis C, chronic (HCC)     History of smoking at least 1 pack per day for at least 30 years     Hyperlipidemia     Hypertension     Migraine     Moderate COPD (chronic obstructive pulmonary disease) ([facility]     Multiple transfusions     Osteoarthritis     hands    Pneumonia     Scoliosis     Substance abuse ([facility]     ivda and cocaine abuse in past     Past Surgical History:   Procedure Laterality Date    APPENDECTOMY  2017     SECTION      GASTRIC BYPASS SURGERY      LAPAROSCOPIC APPENDECTOMY N/A 3/27/2017    APPENDECTOMY LAPAROSCOPIC performed by Melanie Sarmiento MD at [address] [place] [address]      hgsil    MO COLONOSCOPY W/BIOPSY SINGLE/MULTIPLE N/A 11/10/2018    COLONOSCOPY WITH BIOPSY performed by Otoniel Perry MD at [facility]    MO EGD TRANSORAL BIOPSY SINGLE/MULTIPLE N/A 2018    EGD ESOPHAGOGASTRODUODENOSCOPY performed by Lashaun Werner MD at [address] [facility]      oesophagitis, candidiasis    UPPER GASTROINTESTINAL ENDOSCOPY  2018    UPPER GASTROINTESTINAL ENDOSCOPY N/A 2018    EGD CONTROL HEMORRHAGE performed by Lashaun Werner MD at [address]:  Allergies   Allergen Reactions    Aspirin Itching    Keflet [Cephalexin]      Tolerated penicillin V , tolerates cephalexin     Motrin [Ibuprofen]        HOME MEDICATIONS:  Prior to Admission medications    Medication Sig Start Date End Date Taking?  Authorizing Provider   benzonatate (TESSALON PERLES) 100 MG capsule Take 1 capsule by mouth 3 times daily as needed for Cough 3/11/19  Yes Jodi Lopez,    carvedilol (COREG) 6.25 MG tablet Take 1 tablet by mouth 2 times daily (with meals) 19  Yes Eliza Rodriguez MD   ferrous sulfate (MELISSA-HAO) 325 (65 Fe) MG tablet Take 1 tablet by mouth daily 19  Yes Eliza Rodriguez MD   pantoprazole (PROTONIX) 40 MG tablet Take 1 tablet by mouth 2 times daily (before meals) 11/13/18  Yes Mildred Chance MD   folic acid (FOLVITE) 1 MG tablet Take 1 tablet by mouth daily 10/13/18  Yes Hallie Rodriguez MD   vitamin B-12 250 MCG tablet Take 1 tablet by mouth daily 10/13/18  Yes Hallie Rodriguez MD   gabapentin (NEURONTIN) 300 MG capsule Take 300 mg by mouth 3 times daily. .   Yes Historical Provider, MD   buprenorphine-naloxone (SUBOXONE) 12-3 MG sublingual film Place 1 Film under the tongue daily. .   Yes Historical Provider, MD   guaiFENesin (MUCINEX) 600 MG extended release tablet Take 1 tablet by mouth 2 times daily 9/13/18  Yes Mariela Mallory MD   melatonin 1 MG tablet Take 1 tablet by mouth nightly as needed for Sleep 1/11/19   Michael Aguirre MD   ipratropium-albuterol (DUONEB) 0.5-2.5 (3) MG/3ML SOLN nebulizer solution Inhale 3 mLs into the lungs every 4 hours as needed for Shortness of Breath 1/11/19   Michael Aguirre MD   benzocaine-menthol (CEPACOL SORE THROAT) 15-3.6 MG lozenge Take 1 lozenge by mouth every 2 hours as needed for Sore Throat 1/11/19   Michael Aguirre MD   mometasone-formoterol [facility]) 100-5 MCG/ACT inhaler Inhale 2 puffs into the lungs 2 times daily 12/5/18   Maria Luisa Shipley MD   albuterol sulfate HFA (PROVENTIL HFA) 108 (90 Base) MCG/ACT inhaler Inhale 1-2 puffs into the lungs every 4 hours as needed for Wheezing or Shortness of Breath (Space out to every 6 hours as symptoms improve) 12/5/18   Maria Luisa Shipley MD   vitamin B-1 100 MG tablet Take 1 tablet by mouth daily 10/13/18   Hallie Rodriguez MD   Multiple Vitamins-Minerals (THERAPEUTIC MULTIVITAMIN-MINERALS) tablet Take 1 tablet by mouth daily 8/12/18   Joesph Valles MD       CURRENT MEDICATIONS:  Scheduled Meds:   sodium chloride  1,000 mL Intravenous Once    carvedilol  6.25 mg Oral BID WC    docusate sodium  100 mg Oral BID    gabapentin  300 mg Oral TID    mometasone-formoterol  2 puff Inhalation BID    pantoprazole  40 mg Oral BID AC    sodium chloride flush  10 mL Intravenous 2 times per day     Continuous Infusions:   sodium chloride Stopped (06/20/19 1726)     PRN Meds:oxyCODONE, melatonin, sodium chloride flush, acetaminophen    SOCIAL HISTORY:     Tobacco:   reports that she quit smoking about 7 months ago. Her smoking use included cigarettes. She started smoking about 49 years ago. She has a 40.00 pack-year smoking history. She has never used smokeless tobacco.  Alcohol:   reports that she does not drink alcohol. Illicit drugs:  reports that she does not use drugs. FAMILY HISTORY:     No family history on file. REVIEW OF SYSTEMS:    Constitutional: No fever, no chills, no lethargy, no weakness. HEENT:  No headache, otalgia, itchy eyes, nasal discharge or sore throat. Cardiac:  No chest pain, dyspnea, orthopnea or PND. Chest:   No cough, phlegm or wheezing. Abdomen:  + lower abdominal pain, no nausea or vomiting. Neuro:  No focal weakness, abnormal movements or seizure like activity. Skin:   No rashes, no itching. :   No hematuria, no pyuria, no dysuria, no flank pain. Extremities:  No swelling or joint pains. ROS was otherwise negative except as mentioned in the [address] PHYSICAL EXAM:    /79   Pulse 92   Temp 98.6 °F (37 °C) (Oral)   Resp 18   Ht 5' (1.524 m)   Wt 105 lb 3.2 oz (47.7 kg)   LMP 03/18/2015   SpO2 94%   BMI 20.55 kg/m²     GENERAL:   Well developed, Well nourished, No apparent distress  HEAD:   Normocephalic, Atraumatic  EENT:   EOMI, Sclera not icteric, Oropharynx moist   NECK:   Supple, Trachea midline  LUNGS:  CTA Bilaterally  HEART:  RRR, No murmur  ABDOMEN:   Soft, lower tenderness, Nondistended, BS WNL  EXT:   No clubbing. No cyanosis. No edema. SKIN:   No rashes. No jaundice. No stigmata of liver disease.     MUSC/SKEL:   Adequate muscle bulk for patient's age, No significant synovitis, No deformities  NEURO:  A&O x Three, but poor historian, CN II- XII grossly intact      LABS AND IMAGING:     CBC  Recent Labs 06/19/19 2043   WBC 6.7   HGB 11.7*   HCT 38.5   .0   MCH 30.7   MCHC 30.4   PLT See Reflexed IPF Result       BMP  Recent Labs     06/19/19 2043 06/20/19  0922   * 142   K 5.2 4.0    111*   CO2 14* 17*   BUN 8 6*   CREATININE 0.67 0.68   GLUCOSE 74 74   CALCIUM 10.4 8.5*       LFTS  Recent Labs     06/19/19 2043   ALKPHOS 80   ALT 8   AST 29   PROT 9.3*   BILITOT 0.58   LABALBU 4.6       AMYLASE/LIPASE/AMMONIA  Recent Labs     06/19/19 2043   LIPASE 40       PT/INR  Recent Labs     06/19/19 2118   PROTIME 10.4   INR 1.0       ANEMIA STUDIES  No results for input(s): IRON, LABIRON, TIBC, UIBC, FERRITIN, [id number], FOLATE, OCCULTBLD in the last 72 hours. IMAGING  Xr Chest Decubitus Right    Result Date: 6/20/2019  EXAMINATION: DECUBITUS XRAY VIEW(S) OF THE CHEST 6/20/2019 10:36 am COMPARISON: Chest x-ray done earlier same day. HISTORY: ORDERING SYSTEM PROVIDED HISTORY: R/o possible pneumothorax seen on portable CXR TECHNOLOGIST PROVIDED HISTORY: Right side up please R/o possible pneumothorax seen on portable CXR FINDINGS: Left side down decubitus chest is submitted. There is no evidence of right-sided pneumothorax. The previously noted linear finding in the right chest represents a skin fold. No evidence of right-sided pneumothorax. Ct Abdomen Pelvis W Iv Contrast Additional Contrast? None    Result Date: 6/19/2019  EXAMINATION: CT OF THE ABDOMEN AND PELVIS WITH CONTRAST 6/19/2019 9:46 pm TECHNIQUE: CT of the abdomen and pelvis was performed with the administration of intravenous contrast. Multiplanar reformatted images are provided for review. Dose modulation, iterative reconstruction, and/or weight based adjustment of the mA/kV was utilized to reduce the radiation dose to as low as reasonably achievable. COMPARISON: January 7.  HISTORY: ORDERING SYSTEM PROVIDED HISTORY: abdominal pain TECHNOLOGIST PROVIDED HISTORY: Ordering Physician Provided Reason for Exam: ABD PAIN Acuity: Unknown Type of Exam: Unknown Relevant Medical/Surgical History: HTN,COPD,CHF,APPY,,GASTRIC BYPASS FINDINGS: Lower Chest: Small pericardial lymph nodes are again noted. Dependent atelectasis is noted bilaterally. Organs: Low-density in the anterior aspect of the liver near the falciform ligament is a normal variant in liver attenuation/enhancement. There is a small amount of questionable low density adjacent to the gallbladder fossa. Developing fatty infiltration is favored. Intrahepatic ductal dilatation is noted. There is no gallstone. Mild common duct prominence is noted. There is no stone in the duct. Spleen is small without focal lesion. Pancreatic ductal prominence is noted. There is no focal pancreatic lesion. Adrenal glands are normal.  Multiple high density foci are noted in both kidneys most likely contrast.  Small calculi cannot be excluded. There is no hydronephrosis. Renal scarring in the upper pole on the left is noted. GI/Bowel: Stool is noted in the colon. Colonic redundancy is noted. There is no adjacent inflammatory change. No disproportionate small-bowel distention is noted. There is no pneumatosis. Pelvis: Mild heterogeneity of the uterus is noted. This raises the question of fibroids. No focal abnormality of the urinary bladder is noted. Redundancy of the distal colon is noted. Proximal sigmoid colon is incompletely distended. Redundancy of the sigmoid colon is noted. Appendix is at the upper limits of normal in caliber measuring approximately 7 mm transverse. There is no adjacent inflammatory change. Peritoneum/Retroperitoneum: Vascular calcifications are noted. No adenopathy is noted. Mesenteric vessels are patent. Bones/Soft Tissues: New heterogeneous low-density is noted adjacent to the margins of the right hip joint. No enlarged inguinal lymph nodes are noted. Canal detail in the lumbar region is limited. A few small disc bulges are noted in the lower lumbar spine. Sclerotic density in the left femoral head raises the question of avascular necrosis. There is new collapse involving the right femoral head. There is heterogeneous sclerosis in the proximal femur at the level of the lesser trochanter, unchanged. Scattered small round lucent foci are noted in the lumbar spine. These are similar compared to the prior. New collapse of the right femoral head with adjacent heterogeneous low-density in the tissues. This may be due to avascular necrosis and associated fracture collapse. Septic joint would be difficult to exclude. Findings in the left hip raise the question of avascular necrosis without collapse. There may be a tiny curvilinear subcapital fracture on coronal images 49 and 50. Nonspecific distention of the common duct without stone. Minimal intrahepatic ductal dilatation is noted. Incomplete sigmoid distention. This may be due to simple underdistention. Mild colitis would be difficult to exclude although there is no inflammatory change Heterogeneity of the uterus could be due to timing of the bolus or fibroid. Xr Chest Portable    Result Date: 6/20/2019  EXAMINATION: ONE XRAY VIEW OF THE CHEST 6/20/2019 9:33 am COMPARISON: 06/19/2019 and 05/12/2019 HISTORY: ORDERING SYSTEM PROVIDED HISTORY: Productive cough TECHNOLOGIST PROVIDED HISTORY: Productive cough FINDINGS: Heart is not enlarged. Chronic pulmonary changes without focal airspace consolidation or pleural effusion. Cysts in the right upper chest, a curvilinear line through the upper lung parenchyma is felt represent a skin fold given aeration of lung beyond this line; however, a small or loculated pneumothorax is not entirely excluded. Consider decubitus views or short interval follow-up. Curvilinear lucency along the right upper lung mass most likely skin fold but small pneumothorax is not excluded. Decubitus views or short interval follow-up is advised.  The findings were sent to the Radiology Right    Result Date: 6/20/2019  EXAMINATION: ONE XRAY VIEW OF THE PELVIS AND TWO XRAY VIEWS RIGHT HIP 6/20/2019 2:42 am COMPARISON: April 21, 2019. HISTORY: ORDERING SYSTEM PROVIDED HISTORY: Right hip AVN TECHNOLOGIST PROVIDED HISTORY: AP and cross-table lateral of the hip please, thank you Right hip AVN Ordering Physician Provided Reason for Exam: Right hip pain. Acuity: Acute Type of Exam: Unknown FINDINGS: Frontal view of the pelvis as well as frontal and cross-table lateral views of the right hip. Redemonstration of findings of right proximal femoral avascular necrosis with mildly progressed femoral head collapse compared to radiographs done April 21, 2019. Similar appearing osseous sclerosis in the left femoral head without significant collapse. Stable patchy sclerosis in the right proximal femoral intertrochanteric region. Right greater than left hip degenerative osseous changes. Excreted contrast material in the urinary bladder. Osteopenia. Normal alignment of the symphysis pubis and bilateral sacroiliac joints. Incompletely characterized lumbar spondylosis. Redemonstration of findings of right proximal femoral avascular necrosis with mildly progressed femoral head collapse compared to radiographs done April 21, 2019. Similar-appearing osseous sclerosis in the left femoral head without significant collapse. ENDOSCOPY     Colonoscopy 11/10/2018     IMPRESSION:   1) Area of linear ulcerations adjacent to the IC valve with surrounding erythema involving the inferior portion of the valve. No signs of active bleeding, suspect recent hemorrhage. No bleeding vessels identified. Findings could represent isolated ischemic colonic ischemic (IRCI) or NSAID induced colopathy (will await path results). 2) Diverticulosis, L>R, mild disease  3) Small internal hemorrhoids  4) No residual blood identified. No active bleeding.    5) TI intubated and colonoscope advanced approximately 15 cm, no sources identified.      RECOMMENDATIONS:   1) Can place patient on CLD today, advance tomorrow as tolerated  2) Continue patient on Protonix IV today, can switch to BID PO 40mg tomorrow  3) Await path results from Bx (look for ischemic changes vs viral inclusions, consider CMV related ulcerations given history of chronic steroids and relative immune suppression)  4) GI to follow, please call for any questions.         Peri Jung MD   Gastroenterology       EGD 11/09/2018  PREOPERATIVE DIAGNOSIS:   Has bleeding ulcer with vessel we banded 4 days ago, now having another bleeding with BBPR   and drop in HGB , this is to r/o re bleeding in upper GI tract      Diagnosis:  No blood old or new in the upper GI tract , very clean blood    the area in the duodenal bulb which we clipped , is healed , two clips still there , but no bleeding at all. Clean bile no blood   Ulcers in the duodenal bulb . clean base in the other side , no bleeding       Recommendations/Plan:   1. If continue to bleed , will prep and do colonoscopy in am   2. H/h monitoring  3. ICU monitoring         Electronically signed by Mireille Lee MD  on 11/9/2018 at 11:15 AM       EGD 11/05/2018  Findings:     Retropharyngeal area was grossly normal appearing     1 cm ulcer in the duodenum at the bulb/2nd portion junction , on the medical aspect with fresh bleeding , pulsating Visible vessel . Injected EPI 1/[id number] 4 cc, then placed 2 endo clips , hemostasis accomplished      Fresh blood in stomach cleaned , no other bleeding source    distal esophageal thick stricture, no esophageal varices   no gastric varices      The scope was removed and the patient tolerated the procedure well.      Recommendations/Plan:   4. ICU monitoring  5. PPI GGT  6. Call if re bleeding   7. ETOH withdrawal prophylaxis  8.  consider ct brain  9.  sepsis w/u  10.  antibiotics coverage fo now   11. F/U In Office in 3-4 weeks  12.  Discussed with the family     Electronically signed by Prashant Cano MD  on 11/5/2018 at 6:01 PM          IMPRESSION:     8year-old female with a history of CHF, tobacco abuse, COPD, Hep C, PUD, and GERD presented to ED with c/o lateral lower abdominal pain. And found to have vascular necrosis of the hips. Incidental finding showed dilated CBD and hepatic duct dilation. MRI/MRCP was ordered to rule out CBD stone, periampullary mass, stricture, or artifact      Old records, labs and imaging reviewed. PLAN   1. MRI/MRCP - completed - CBD upper limits or normal. No sign of CBD stone or mass  2. Patient to follow up with GI in O/P setting to follow up on ? Iron depositis on live as seen on MRI  3. Ok to be discharged from GI perspective     This plan was formulated in collaboration with Dr. Manan Dozier . Thank you for allowing us to participate in the care of your patient. Electronically signed by: Sukhdev MENG on 6/20/2019 at 7:07 PM   Attending Physician Statement  I have discussed the care of Emi Daniels and   I have examined the patient myselft and taken ros and hpi , including pertinent history and exam findings,  with the author of this note . I have reviewed the key elements of all parts of the encounter with the nurse practitioner/resident. I agree with the assessment, plan and orders as documented by the above health care provider       Electronically signed by Prashant Cano MD     Please note that this note was generated using a voice recognition dictation software. Although every effort was made to ensure the accuracy of this automated transcription, some errors in transcription may have occurred.

## 2019-06-20 NOTE — PROGRESS NOTES
Physical Therapy    Facility/Department: [address] MED SURG  Initial Assessment    NAME: Emi Daniels  : 1955  MRN: [id number]    Date of Service: 2019    Discharge Recommendations:    No further therapy required at discharge. Assessment   Body structures, Functions, Activity limitations: Decreased functional mobility ; Decreased endurance;Decreased strength  Assessment: Moved slowly with mobilization but good effort throughout. Prognosis: Good  Decision Making: Medium Complexity  Patient Education: PT  POC, Goals  REQUIRES PT FOLLOW UP: Yes  Activity Tolerance  Activity Tolerance: Patient limited by fatigue       Patient Diagnosis(es): The primary encounter diagnosis was Closed fracture of head of right femur, initial encounter ([facility] A diagnosis of Common bile duct dilatation was also pertinent to this visit. has a past medical history of Appendicitis, CHF (congestive heart failure) (HCC), Chronic respiratory failure with hypoxia and hypercapnia (HCC), Chronic rhinitis, Cigarette smoker, COPD (chronic obstructive pulmonary disease) ([address] Depression, Dysphagia, Essential hypertension, GERD (gastroesophageal reflux disease), Heart failure, diastolic, with acute decompensation ([address] Hepatitis C, chronic ([address] History of smoking at least 1 pack per day for at least 30 years, Hyperlipidemia, Hypertension, Migraine, Moderate COPD (chronic obstructive pulmonary disease) ([address] Multiple transfusions, Osteoarthritis, Pneumonia, Scoliosis, and Substance abuse ([address] has a past surgical history that includes  section; LEEP (); Upper gastrointestinal endoscopy (); Gastric bypass surgery; Appendectomy (2017); laparoscopic appendectomy (N/A, 3/27/2017); Upper gastrointestinal endoscopy (2018);  Upper gastrointestinal endoscopy (N/A, 2018); pr colonoscopy w/biopsy single/multiple (N/A, 11/10/2018); and pr egd transoral biopsy single/multiple (N/A, 11/9/2018). Restrictions  Restrictions/Precautions  Restrictions/Precautions: Up as Tolerated  Vision/Hearing  Vision: Impaired  Vision Exceptions: Wears glasses at all times  Hearing: Within functional limits     Subjective  General  Patient assessed for rehabilitation services?: Yes  Response To Previous Treatment: Not applicable  Family / Caregiver Present: No  Follows Commands: Within Functional Limits  Pain Screening  Patient Currently in Pain: No  Pain Assessment  Pain Assessment: 0-10  Pain Level: 0  Vital Signs  Patient Currently in Pain: No       Orientation  Orientation  Overall Orientation Status: Within Normal Limits  Social/Functional History  Social/Functional History  Lives With: Alone  Type of Home: Apartment(3rd fl apt)  Home Layout: One level  Home Access: Elevator, Stairs to enter with rails  Entrance Stairs - Number of Steps: 2  Entrance Stairs - Rails: Left  Bathroom Shower/Tub: Tub/Shower unit  Bathroom Toilet: Standard  Bathroom Equipment: Grab bars in shower, Shower chair  Home Equipment: 4 wheeled walker  Receives Help From: Family(Son supportive)  ADL Assistance: Independent  Homemaking Assistance: Independent  Homemaking Responsibilities: Yes  Ambulation Assistance: Independent  Transfer Assistance: Independent  Active : No  Patient's  Info:  Son Drives  Occupation: Retired  Type of occupation: Retired from housekeeping  Cognition      Objective     AROM RLE (degrees)  RLE AROM: WNL  AROM LLE (degrees)  LLE AROM : WNL  AROM RUE (degrees)  RUE AROM : WNL  AROM LUE (degrees)  LUE AROM : WNL  Strength RLE  Strength RLE: WFL  Strength LLE  Strength LLE: WFL  Strength RUE  Strength RUE: WFL  Strength LUE  Strength LUE: WFL     Sensation  Overall Sensation Status: WFL  Bed mobility  Rolling to Left: Contact guard assistance  Rolling to Right: Contact guard assistance  Supine to Sit: Contact guard assistance  Sit to Supine: Contact guard assistance  Scooting: Contact guard assistance  Transfers  Sit to Stand: Contact guard assistance  Stand to sit: Contact guard assistance  Ambulation  Ambulation?: Yes  Ambulation 1  Surface: level tile  Device: Rollator  Assistance: Contact guard assistance  Distance: amb 50 ft with a rollator x CGA     Balance  Posture: Good  Sitting - Static: Good  Sitting - Dynamic: Fair  Standing - Static: Fair  Standing - Dynamic: [address]  Times per week: 5-6x wk  Current Treatment Recommendations: Strengthening, Transfer Training, Endurance Training, Gait Training, Functional Mobility Training, Stair training, Safety Education & Training  Safety Devices  Type of devices: Call light within reach, Left in bed, Nurse notified    G-Code     OutComes Score         AM-PAC Score  AM-PAC Inpatient Mobility Raw Score : 19 (06/20/19 1029)  AM-PAC Inpatient T-Scale Score : 45.44 (06/20/19 1029)  Mobility Inpatient CMS 0-100% Score: 41.77 (06/20/19 1029)  Mobility Inpatient CMS G-Code Modifier : CK (06/20/19 1029)     Goals  Short term goals  Time Frame for Short term goals: 10 visits  Short term goal 1: transfers with SBA  Short term goal 2: amb 125 ft with a RW x SBA  Short term goal 3: ascend/descend 4 steps with SBA  Short term goal 4: exercise program with SBA  Patient Goals   Patient goals : Return home       Therapy Time   Individual Concurrent Group Co-treatment   Time In 0800         Time Out 0900         Minutes 25             1 of [facility],

## 2019-06-20 NOTE — CARE COORDINATION
Case Management Initial Discharge Plan  Emi Daniels,             Met with:patient to discuss discharge plans. Information verified: address, contacts, phone number, , insurance Yes  PCP: No primary care provider on file. Date of last visit: states her PCP is on [place], \"appointment was not too long ago\"    Insurance Provider: [employer]    Discharge Planning    Living Arrangements:  Alone   Support Systems:  Family Members, Children    Home: apartment, building has an elevator. Patient able to perform ADL's:Independent    Current Services (outpatient & in home) [address] [facility]  DME equipment: rollator, concentrator, shower chair,  DME provider:     Pharmacy: [facility], medications are delivered to pt's home   Potential Assistance Purchasing Medications:  No  Does patient want to participate in local refill/ meds to beds program?  No    Potential Assistance Needed:  Durable Medical Equipment    Patient agreeable to home care: Yes, current with [facility]  [place] of choice provided:  n/a    Prior SNF/Rehab Placement and Facility:   Agreeable to SNF/Rehab: No  [place] of choice provided: n/a   Evaluation: no    Expected Discharge date:  19  Patient expects to be discharged to:  home  Follow Up Appointment: Best Day/ Time: Monday AM    Transportation provider: medical cab/family  Transportation arrangements needed for discharge: No, family will transport. Readmission Risk              Risk of Unplanned Readmission:        44             Does patient have a readmission risk score greater than 14?: Yes  If yes, follow-up appointment must be made within 7 days of discharge. Discharge Plan: return to home, with [facility]. 12:32PM, faxed face sheet to [facility] [phone].             Electronically signed by El Mckeon RN on 19 at 10:57 AM

## 2019-06-20 NOTE — ED PROVIDER NOTES
[facility]  Emergency Department Encounter  EmergencyMedicine Resident     Pt Name:Emi Ross  MRN: [id number]  Birthdate 1955  Date of evaluation: 19  PCP:  No primary care provider on file. CHIEF COMPLAINT       Chief Complaint   Patient presents with    Abdominal Pain       HISTORY OF PRESENT ILLNESS  (Location/Symptom, Timing/Onset, Context/Setting, Quality, Duration, Modifying Factors, Severity.)      Emi Daniels is a 75 y.o. female who presents with abdominal pain. Patient states she had lower abdominal pain for the last month is not been able to get a ride to the emergency room. She states is located in bilateral lower quadrants. She states sharp and nonradiating. She denies any nausea, vomiting, fever chills. Denies any dysuria hematuria diarrhea or constipation. She states her past surgical history is a history of an appendectomy and prior  section. PAST MEDICAL / SURGICAL / SOCIAL / FAMILY HISTORY      has a past medical history of Appendicitis, CHF (congestive heart failure) (HCC), Chronic respiratory failure with hypoxia and hypercapnia (HCC), Chronic rhinitis, Cigarette smoker, COPD (chronic obstructive pulmonary disease) ([address] Depression, Dysphagia, Essential hypertension, GERD (gastroesophageal reflux disease), Heart failure, diastolic, with acute decompensation ([address] Hepatitis C, chronic ([address] History of smoking at least 1 pack per day for at least 30 years, Hyperlipidemia, Hypertension, Migraine, Moderate COPD (chronic obstructive pulmonary disease) ([address] Multiple transfusions, Osteoarthritis, Pneumonia, Scoliosis, and Substance abuse ([address] has a past surgical history that includes  section; LEEP (); Upper gastrointestinal endoscopy (); Gastric bypass surgery; Appendectomy (2017); laparoscopic appendectomy (N/A, 3/27/2017); Upper gastrointestinal endoscopy (2018);  Upper gastrointestinal endoscopy (N/A, 2018); pr colonoscopy w/biopsy single/multiple (N/A, 11/10/2018); and pr egd transoral biopsy single/multiple (N/A, 2018). Social History     Socioeconomic History    Marital status: Single     Spouse name: Not on file    Number of children: Not on file    Years of education: Not on file    Highest education level: Not on file   Occupational History    Not on file   Social Needs    Financial resource strain: Not on file    Food insecurity:     Worry: Not on file     Inability: Not on file    Transportation needs:     Medical: Not on file     Non-medical: Not on file   Tobacco Use    Smoking status: Former Smoker     Packs/day: 1.00     Years: 40.00     Pack years: 40.00     Types: Cigarettes     Start date:      Last attempt to quit: 2018     Years since quittin.6    Smokeless tobacco: Never Used   Substance and Sexual Activity    Alcohol use: No     Alcohol/week: 0.0 oz    Drug use: No     Types: IV, Cocaine     Comment: past history    Sexual activity: Not on file   Lifestyle    Physical activity:     Days per week: Not on file     Minutes per session: Not on file    Stress: Not on file   Relationships    Social connections:     Talks on phone: Not on file     Gets together: Not on file     Attends Confucianism service: Not on file     Active member of club or organization: Not on file     Attends meetings of clubs or organizations: Not on file     Relationship status: Not on file    Intimate partner violence:     Fear of current or ex partner: Not on file     Emotionally abused: Not on file     Physically abused: Not on file     Forced sexual activity: Not on file   Other Topics Concern    Not on file   Social History Narrative    Not on file       No family history on file. Allergies:  Aspirin; Keflet [cephalexin]; and Motrin [ibuprofen]    Home Medications:  Prior to Admission medications    Medication Sig Start Date End Date Taking?  Authorizing Provider   benzonatate (TESSALON PERLES) 100 MG capsule Take 1 capsule by mouth 3 times daily as needed for Cough 3/11/19  Yes Andre Lopez DO   carvedilol (COREG) 6.25 MG tablet Take 1 tablet by mouth 2 times daily (with meals) 2/7/19  Yes Sheldon Guerra MD   ferrous sulfate (MELISSA-HAO) 325 (65 Fe) MG tablet Take 1 tablet by mouth daily 2/7/19  Yes Sheldon Guerra MD   pantoprazole (PROTONIX) 40 MG tablet Take 1 tablet by mouth 2 times daily (before meals) 11/13/18  Yes Lisa Ortiz MD   folic acid (FOLVITE) 1 MG tablet Take 1 tablet by mouth daily 10/13/18  Yes Jose Antonio Zapata MD   vitamin B-12 250 MCG tablet Take 1 tablet by mouth daily 10/13/18  Yes Jose Antonio Zapata MD   gabapentin (NEURONTIN) 300 MG capsule Take 300 mg by mouth 3 times daily. .   Yes Historical Provider, MD   buprenorphine-naloxone (SUBOXONE) 12-3 MG sublingual film Place 1 Film under the tongue daily. .   Yes Historical Provider, MD   guaiFENesin (MUCINEX) 600 MG extended release tablet Take 1 tablet by mouth 2 times daily 9/13/18  Yes Kathi Bond MD   melatonin 1 MG tablet Take 1 tablet by mouth nightly as needed for Sleep 1/11/19   Pedro Urena MD   ipratropium-albuterol (DUONEB) 0.5-2.5 (3) MG/3ML SOLN nebulizer solution Inhale 3 mLs into the lungs every 4 hours as needed for Shortness of Breath 1/11/19   Pedro Urena MD   benzocaine-menthol (CEPACOL SORE THROAT) 15-3.6 MG lozenge Take 1 lozenge by mouth every 2 hours as needed for Sore Throat 1/11/19   Pedro Urena MD   mometasone-formoterol [facility]) 100-5 MCG/ACT inhaler Inhale 2 puffs into the lungs 2 times daily 12/5/18   Hussein Willoughby MD   albuterol sulfate HFA (PROVENTIL HFA) 108 (90 Base) MCG/ACT inhaler Inhale 1-2 puffs into the lungs every 4 hours as needed for Wheezing or Shortness of Breath (Space out to every 6 hours as symptoms improve) 12/5/18   Hussein Willoughby MD   vitamin B-1 100 MG tablet Take 1 tablet by mouth daily 10/13/18   Jose Antonio Zapata MD   Multiple Vitamins-Minerals Breath sounds normal. No stridor. No respiratory distress. She has no wheezes. She has no rales. She exhibits no tenderness. Abdominal: Soft. Bowel sounds are normal. There is tenderness. There is guarding. There is no rebound. Musculoskeletal: Normal range of motion. She exhibits no edema or tenderness. Lymphadenopathy:     She has no cervical adenopathy. Neurological: She is alert and oriented to person, place, and time. She has normal reflexes. She displays normal reflexes. No cranial nerve deficit. She exhibits normal muscle tone. Coordination normal.   Skin: Skin is warm and dry. No rash noted. She is not diaphoretic. No erythema. No pallor. Psychiatric: She has a normal mood and affect. Her behavior is normal. Judgment and thought content normal.   Nursing note and vitals reviewed.       DIFFERENTIAL  DIAGNOSIS     PLAN (LABS / IMAGING / EKG):  Orders Placed This Encounter   Procedures    Culture Blood #1    Culture Blood #1    Urine Culture    CT ABDOMEN PELVIS W IV CONTRAST Additional Contrast? None    CBC WITH AUTO DIFFERENTIAL    Comprehensive Metabolic Panel    LIPASE    Urinalysis Reflex to Culture    Protime-INR    APTT    PREVIOUS SPECIMEN    Immature Platelet Fraction    SPECIMEN REJECTION    Lactic Acid, Whole Blood    PREVIOUS SPECIMEN    Microscopic Urinalysis    DIET GENERAL;    Diet NPO, After Midnight    Vital signs per unit routine    Notify physician    Notify physician    Up as tolerated    Place intermittent pneumatic compression device    Full Code    Inpatient consult to Orthopedic Surgery    Inpatient consult to GI    PATIENT STATUS (FROM ED OR OR/PROCEDURAL) Observation       MEDICATIONS ORDERED:  Orders Placed This Encounter   Medications    0.9 % sodium chloride bolus    0.9 % sodium chloride bolus    ondansetron (ZOFRAN) injection 4 mg    morphine injection 4 mg    iopamidol (ISOVUE-370) 76 % injection 75 mL    carvedilol (COREG) tablet 6.25 mg    docusate sodium (COLACE) capsule 100 mg    gabapentin (NEURONTIN) capsule 300 mg    melatonin tablet 1 mg    mometasone-formoterol (DULERA) 100-5 MCG/ACT inhaler 2 puff    pantoprazole (PROTONIX) tablet 40 mg    sodium chloride flush 0.9 % injection 10 mL    sodium chloride flush 0.9 % injection 10 mL    acetaminophen (TYLENOL) tablet 650 mg    0.9 % sodium chloride infusion    OR Linked Order Group     fentaNYL (SUBLIMAZE) injection 25 mcg     fentaNYL (SUBLIMAZE) injection 50 mcg       DDX: Colitis, hernia, UTI, sepsis, diverticulitis    DIAGNOSTIC RESULTS / EMERGENCY DEPARTMENT COURSE / MDM     LABS:  Results for orders placed or performed during the hospital encounter of 06/19/19   CBC WITH AUTO DIFFERENTIAL   Result Value Ref Range    WBC 6.7 3.5 - 11.3 k/uL    RBC 3.81 (L) 3.95 - 5.11 m/uL    Hemoglobin 11.7 (L) 11.9 - 15.1 g/dL    Hematocrit 38.5 36.3 - 47.1 %    .0 82.6 - 102.9 fL    MCH 30.7 25.2 - 33.5 pg    MCHC 30.4 28.4 - 34.8 g/dL    RDW 18.5 (H) 11.8 - 14.4 %    Platelets See Reflexed IPF Result 138 - 453 k/uL    MPV NOT REPORTED 8.1 - 13.5 fL    NRBC Automated 0.0 0.0 per 100 WBC    Differential Type NOT REPORTED     WBC Morphology NOT REPORTED     RBC Morphology ANISOCYTOSIS PRESENT     Platelet Estimate NOT REPORTED     Seg Neutrophils 61 36 - 65 %    Lymphocytes 31 24 - 43 %    Monocytes 6 3 - 12 %    Eosinophils % 2 1 - 4 %    Basophils 0 0 - 2 %    Immature Granulocytes 1 (H) 0 %    Segs Absolute 4.07 1.50 - 8.10 k/uL    Absolute Lymph # 2.05 1.10 - 3.70 k/uL    Absolute Mono # 0.39 0.10 - 1.20 k/uL    Absolute Eos # 0.12 0.00 - 0.44 k/uL    Basophils # <0.03 0.00 - 0.20 k/uL    Absolute Immature Granulocyte 0.06 0.00 - 0.30 k/uL   Comprehensive Metabolic Panel   Result Value Ref Range    Glucose 74 70 - 99 mg/dL    BUN 8 8 - 23 mg/dL    CREATININE 0.67 0.50 - 0.90 mg/dL    Bun/Cre Ratio NOT REPORTED 9 - 20    Calcium 10.4 8.6 - 10.4 mg/dL    Sodium 134 (L) 135 - 144 mmol/L    Potassium 5.2 3.7 - 5.3 mmol/L    Chloride 100 98 - 107 mmol/L    CO2 14 (L) 20 - 31 mmol/L    Anion Gap 20 (H) 9 - 17 mmol/L    Alkaline Phosphatase 80 35 - 104 U/L    ALT 8 5 - 33 U/L    AST 29 <32 U/L    Total Bilirubin 0.58 0.3 - 1.2 mg/dL    Total Protein 9.3 (H) 6.4 - 8.3 g/dL    Alb 4.6 3.5 - 5.2 g/dL    Albumin/Globulin Ratio 1.0 1.0 - 2.5    GFR Non-African American >60 >60 mL/min    GFR African American >60 >60 mL/min    GFR Comment          GFR Staging NOT REPORTED    LIPASE   Result Value Ref Range    Lipase 40 13 - 60 U/L   Urinalysis Reflex to Culture   Result Value Ref Range    Color, UA YELLOW YELLOW    Turbidity UA CLEAR CLEAR    Glucose, Ur NEGATIVE NEGATIVE    Bilirubin Urine NEGATIVE NEGATIVE    Ketones, Urine MODERATE (A) NEGATIVE    Specific Gravity, UA 1.046 (H) 1.005 - 1.030    Urine Hgb NEGATIVE NEGATIVE    pH, UA 5.0 5.0 - 8.0    Protein, UA NEGATIVE NEGATIVE    Urobilinogen, Urine Normal Normal    Nitrite, Urine NEGATIVE NEGATIVE    Leukocyte Esterase, Urine SMALL (A) NEGATIVE    Urinalysis Comments NOT REPORTED    Protime-INR   Result Value Ref Range    Protime 10.4 9.0 - 12.0 sec    INR 1.0    APTT   Result Value Ref Range    PTT 22.9 20.5 - 30.5 sec   Immature Platelet Fraction   Result Value Ref Range    Platelet, Fluorescence Platelet clumps present, count appears adequate. 138 - 453 k/uL   SPECIMEN REJECTION   Result Value Ref Range    Specimen Source . BLOOD     Ordered Test LACWB     Reason for Rejection       Unable to perform testing: Specimen quantity not sufficient.    - NOT REPORTED    Lactic Acid, Whole Blood   Result Value Ref Range    Lactic Acid, Whole Blood 0.9 0.7 - 2.1 mmol/L   Microscopic Urinalysis   Result Value Ref Range    -          WBC, UA 5 TO 10 0 - 5 /HPF    RBC, UA 0 TO 2 0 - 4 /HPF    Casts UA  0 - 8 /LPF     2 TO 5 HYALINE Reference range defined for non-centrifuged specimen.     Crystals UA NOT REPORTED None /HPF    Epithelial Cells UA 5 TO 10 0 - 5 /HPF    Renal Epithelial, Urine NOT REPORTED 0 /HPF    Bacteria, UA NOT REPORTED None    Mucus, UA NOT REPORTED None    Trichomonas, UA NOT REPORTED None    Amorphous, UA NOT REPORTED None    Other Observations UA NOT REPORTED NOT REQ. Yeast, UA NOT REPORTED None       IMPRESSION: Patient resents with lower abdominal pain. On exam she is tender, tachycardic in her lower quadrants. No pulsatile mass. CT scan in January revealed no AAA. No other rashes or signs of infection however plan is CT scan, full septic labs urinalysis fluids. Will reassess after CT scan and lab results. Unclear etiology of patient's symptoms but consider cystitis, diverticulitis, mesenteric ischemia, however less likely. RADIOLOGY:  Ct Abdomen Pelvis W Iv Contrast Additional Contrast? None    Result Date: 6/19/2019  EXAMINATION: CT OF THE ABDOMEN AND PELVIS WITH CONTRAST 6/19/2019 9:46 pm TECHNIQUE: CT of the abdomen and pelvis was performed with the administration of intravenous contrast. Multiplanar reformatted images are provided for review. Dose modulation, iterative reconstruction, and/or weight based adjustment of the mA/kV was utilized to reduce the radiation dose to as low as reasonably achievable. COMPARISON: January 7. HISTORY: ORDERING SYSTEM PROVIDED HISTORY: abdominal pain TECHNOLOGIST PROVIDED HISTORY: Ordering Physician Provided Reason for Exam: ABD PAIN Acuity: Unknown Type of Exam: Unknown Relevant Medical/Surgical History: HTN,COPD,CHF,APPY,,GASTRIC BYPASS FINDINGS: Lower Chest: Small pericardial lymph nodes are again noted. Dependent atelectasis is noted bilaterally. Organs: Low-density in the anterior aspect of the liver near the falciform ligament is a normal variant in liver attenuation/enhancement. There is a small amount of questionable low density adjacent to the gallbladder fossa. Developing fatty infiltration is favored. Intrahepatic ductal dilatation is noted. There is no gallstone.   Mild common duct prominence is noted. There is no stone in the duct. Spleen is small without focal lesion. Pancreatic ductal prominence is noted. There is no focal pancreatic lesion. Adrenal glands are normal.  Multiple high density foci are noted in both kidneys most likely contrast.  Small calculi cannot be excluded. There is no hydronephrosis. Renal scarring in the upper pole on the left is noted. GI/Bowel: Stool is noted in the colon. Colonic redundancy is noted. There is no adjacent inflammatory change. No disproportionate small-bowel distention is noted. There is no pneumatosis. Pelvis: Mild heterogeneity of the uterus is noted. This raises the question of fibroids. No focal abnormality of the urinary bladder is noted. Redundancy of the distal colon is noted. Proximal sigmoid colon is incompletely distended. Redundancy of the sigmoid colon is noted. Appendix is at the upper limits of normal in caliber measuring approximately 7 mm transverse. There is no adjacent inflammatory change. Peritoneum/Retroperitoneum: Vascular calcifications are noted. No adenopathy is noted. Mesenteric vessels are patent. Bones/Soft Tissues: New heterogeneous low-density is noted adjacent to the margins of the right hip joint. No enlarged inguinal lymph nodes are noted. Canal detail in the lumbar region is limited. A few small disc bulges are noted in the lower lumbar spine. Sclerotic density in the left femoral head raises the question of avascular necrosis. There is new collapse involving the right femoral head. There is heterogeneous sclerosis in the proximal femur at the level of the lesser trochanter, unchanged. Scattered small round lucent foci are noted in the lumbar spine. These are similar compared to the prior. New collapse of the right femoral head with adjacent heterogeneous low-density in the tissues. This may be due to avascular necrosis and associated fracture collapse.   Septic joint would be difficult to exclude. Findings in the left hip raise the question of avascular necrosis without collapse. There may be a tiny curvilinear subcapital fracture on coronal images 49 and 50. Nonspecific distention of the common duct without stone. Minimal intrahepatic ductal dilatation is noted. Incomplete sigmoid distention. This may be due to simple underdistention. Mild colitis would be difficult to exclude although there is no inflammatory change Heterogeneity of the uterus could be due to timing of the bolus or fibroid. EKG  None    All EKG's are interpreted by the Emergency Department Physician who either signs or Co-signs this chart in the absence of a cardiologist.    EMERGENCY DEPARTMENT COURSE:  CT showing possible new fracture of the femoral head versus avascular necrosis. We will have orthopedic surgery evaluate. Also showing common bile duct dilation. Patient is having right upper quadrant pain. There is no leukocytosis, LFT elevation or bilirubin elevation however this raises concerns. We will have GI evaluate in observation unit. Will admit for IV fluids and pain control. Patient agreeable to this. Urine is still pending. We will follow-up on results and treat with antibiotics if necessary. PROCEDURES:  None    CONSULTS:  IP CONSULT TO ORTHOPEDIC SURGERY  IP CONSULT TO GI    CRITICAL CARE:  None    FINAL IMPRESSION      1. Closed fracture of head of right femur, initial encounter ([facility] [address] [facility]    2. Common bile duct dilatation          DISPOSITION / PLAN     DISPOSITION  Admit      PATIENT REFERRED TO:  No follow-up provider specified. DISCHARGE MEDICATIONS:  Current Discharge Medication List          Davin Pruett DO  Emergency Medicine Resident    (Please note that portions of thisnote were completed with a voice recognition program.  Efforts were made to edit the dictations but occasionally words are mis-transcribed. )        Davin Pruett DO  Resident  06/19/19 [id number]

## 2019-06-20 NOTE — DISCHARGE INSTR - COC
Concerns:     History of Falls (last 30 days)    Impairments/Disabilities:      None    Nutrition Therapy:  Current Nutrition Therapy:   - Oral Diet:  General    Routes of Feeding: Oral  Liquids: No Restrictions  Daily Fluid Restriction: no  Last Modified Barium Swallow with Video (Video Swallowing Test): not done    Treatments at the Time of Hospital Discharge:   Respiratory Treatments: ***  Oxygen Therapy:  is not on home oxygen therapy. Ventilator:    - No ventilator support    Rehab Therapies: Physical Therapy  Weight Bearing Status/Restrictions: No weight bearing restirctions  Other Medical Equipment (for information only, NOT a DME order):  walker  Other Treatments: strength training. Patient's personal belongings (please select all that are sent with patient):  None    RN SIGNATURE:  Electronically signed by Saint Crumble on 6/20/19 at 5:35 PM    CASE MANAGEMENT/SOCIAL WORK SECTION    Inpatient Status Date: ***    Readmission Risk Assessment Score:  Readmission Risk              Risk of Unplanned Readmission:        40           Discharging to Facility/ Agency   · Name: [facility]  ·   Address:  [address] [place] [address]        Phone: [phone]       Fax: [phone]        ·   · Phone:  · Fax:    Dialysis Facility (if applicable)   · Name:  · Address:  · Dialysis Schedule:  · Phone:  · Fax:    / signature: Electronically signed by Darin Juan RN on 6/20/19 at 12:26 PM    PHYSICIAN SECTION    Prognosis: Fair    Condition at Discharge: Stable    Rehab Potential (if transferring to Rehab): Fair    Recommended Labs or Other Treatments After Discharge: none    Physician Certification: I certify the above information and transfer of Emi Daniels  is necessary for the continuing treatment of the diagnosis listed and that she requires Home Care for greater 30 days.      Update Admission H&P: No change in H&P    PHYSICIAN SIGNATURE:  Electronically signed by

## 2019-06-20 NOTE — PROGRESS NOTES
Occupational Therapy   Occupational Therapy Initial Assessment  Date: 2019   Patient Name: Tabby Gallagher  MRN: [id number]     : 1955    Date of Service: 2019    Discharge Recommendations:    Further therapy recommended at discharge. OT Equipment Recommendations  Equipment Needed: Yes  Mobility Devices: ADL Assistive Devices  ADL Assistive Devices: Shower Chair with back; Toileting - 3-in-1 Commode    Assessment   Performance deficits / Impairments: Decreased functional mobility ; Decreased safe awareness;Decreased balance;Decreased ADL status; Decreased high-level IADLs;Decreased endurance;Decreased strength  Assessment: Patient demonstrates decreased ability to engage in functional activities that involve standing/walking to complete due to patient denial. Patient is limited due to pain in R hip impacting participation and overall performance in self-care tasks. Plan to need assistance due to hip pain, waiting to hear if patient to be getting replaced per patient. Patient expected to need skilled OT services at this time for a safe return home. Prognosis: Fair  Decision Making: Medium Complexity  Patient Education: Patient educated on role of therapy, OT POC, importance of moving - fair/poor return  REQUIRES OT FOLLOW UP: Yes  Activity Tolerance  Activity Tolerance: Patient limited by pain  Safety Devices  Safety Devices in place: Yes  Type of devices: Nurse notified;Call light within reach; Patient at risk for falls; Left in bed           Patient Diagnosis(es): The primary encounter diagnosis was Closed fracture of head of right femur, initial encounter ([facility] [address] A diagnosis of Common bile duct dilatation was also pertinent to this visit.      has a past medical history of Appendicitis, CHF (congestive heart failure) ([facility]), Chronic respiratory failure with hypoxia and hypercapnia ([address] Chronic rhinitis, Cigarette smoker, COPD (chronic obstructive pulmonary disease) ([facility] [address] Depression, Dysphagia, Essential hypertension, GERD (gastroesophageal reflux disease), Heart failure, diastolic, with acute decompensation ([facility] [address] Hepatitis C, chronic (HCC), History of smoking at least 1 pack per day for at least 30 years, Hyperlipidemia, Hypertension, Migraine, Moderate COPD (chronic obstructive pulmonary disease) ([facility] [address] Multiple transfusions, Osteoarthritis, Pneumonia, Scoliosis, and Substance abuse ([facility] [address] has a past surgical history that includes  section; LEEP (); Upper gastrointestinal endoscopy (); Gastric bypass surgery; Appendectomy (2017); laparoscopic appendectomy (N/A, 3/27/2017); Upper gastrointestinal endoscopy (2018); Upper gastrointestinal endoscopy (N/A, 2018); pr colonoscopy w/biopsy single/multiple (N/A, 11/10/2018); and pr egd transoral biopsy single/multiple (N/A, 2018). Restrictions  Restrictions/Precautions  Restrictions/Precautions: Up as Tolerated  Required Braces or Orthoses?: No    Subjective   General  Chart Reviewed: No  Patient assessed for rehabilitation services?: Yes  Family / Caregiver Present: No  General Comment  Comments: RN ok'd patient for therapy services.  Patient declined getting up and walking due to R hip being \"broken'  Pain Assessment  Pain Assessment: 0-10  Pain Level: 7  Pain Location: Hip  Pain Orientation: Right  Non-Pharmaceutical Pain Intervention(s): Therapeutic presence;Distraction  Response to Pain Intervention: Patient Satisfied  Social/Functional History  Social/Functional History  Lives With: Alone  Type of Home: Apartment(3rd floor)  Home Layout: One level  Home Access: Elevator, Stairs to enter with rails  Entrance Stairs - Number of Steps: 2  Entrance Stairs - Rails: Left  Bathroom Shower/Tub: Tub/Shower unit  Bathroom Toilet: Standard  Bathroom Equipment: Grab bars in shower, Shower chair  Bathroom Accessibility: Accessible  Home Equipment: 4 wheeled walker  Receives Help From: Family(states son and son's gf General: 4-/5  RUE Strength  R Hand General: 4-/5              Plan   Plan  Times per week: 3-4x/wk  Current Treatment Recommendations: Strengthening, Endurance Training, Patient/Caregiver Education & Training, Self-Care / ADL, Home Management Training, Safety Education & Training    AM-PAC Score        AM-PAC Inpatient Daily Activity Raw Score: 21 (06/20/19 1110)  AM-PAC Inpatient ADL T-Scale Score : 44.27 (06/20/19 1110)  ADL Inpatient CMS 0-100% Score: 32.79 (06/20/19 1110)  ADL Inpatient CMS G-Code Modifier : Tom Rash (06/20/19 1110)    Goals  Short term goals  Time Frame for Short term goals: Patient will, by discharge  Short term goal 1: demonstrate UB self-cares at Mod I   Short term goal 2: demonstrate LB self-cares at mod I   Short term goal 3: demonstrate tolieting transfer and task at Mod I with AE PRN   Short term goal 4: demonstrate ~20 minutes of functional activity tolerance  Short term goal 5: demonstrate grooming tasks at Mod I in with AE PRN        Therapy Time   Individual Concurrent Group Co-treatment   Time In 0932         Time Out 0943         Minutes 11         Timed Code Treatment Minutes: [address] OTR/L

## 2019-06-20 NOTE — ED PROVIDER NOTES
[facility]     Emergency Department     Faculty Attestation    I performed a history and physical examination of the patient and discussed management with the resident. I reviewed the resident´s note and agree with the documented findings and plan of care. Any areas of disagreement are noted on the chart. I was personally present for the key portions of any procedures. I have documented in the chart those procedures where I was not present during the key portions. I have reviewed the emergency nurses triage note. I agree with the chief complaint, past medical history, past surgical history, allergies, medications, social and family history as documented unless otherwise noted below. For Physician Assistant/ Nurse Practitioner cases/documentation I have personally evaluated this patient and have completed at least one if not all key elements of the E/M (history, physical exam, and MDM). Additional findings are as noted. Bilateral lower abdominal pain slightly worse on the left, voluntary guarding, no rebounding, also pain bilateral inguinal creases. ` History of appendectomy.      Mundo Carlisle MD  06/19/19 1213

## 2019-06-21 LAB
CULTURE: NORMAL
EKG ATRIAL RATE: 89 BPM
EKG P AXIS: 49 DEGREES
EKG P-R INTERVAL: 174 MS
EKG Q-T INTERVAL: 344 MS
EKG QRS DURATION: 68 MS
EKG QTC CALCULATION (BAZETT): 418 MS
EKG R AXIS: 35 DEGREES
EKG T AXIS: 46 DEGREES
EKG VENTRICULAR RATE: 89 BPM
Lab: NORMAL
SPECIMEN DESCRIPTION: NORMAL

## 2019-06-21 PROCEDURE — 93010 ELECTROCARDIOGRAM REPORT: CPT | Performed by: INTERNAL MEDICINE

## 2019-06-22 ENCOUNTER — HOSPITAL ENCOUNTER (EMERGENCY)
Age: 64
Discharge: HOME | End: 2019-06-22
Attending: EMERGENCY MEDICINE
Payer: MEDICARE

## 2019-06-22 VITALS
TEMPERATURE: 98.8 F | SYSTOLIC BLOOD PRESSURE: 166 MMHG | OXYGEN SATURATION: 100 % | HEART RATE: 82 BPM | RESPIRATION RATE: 18 BRPM | DIASTOLIC BLOOD PRESSURE: 92 MMHG

## 2019-06-22 DIAGNOSIS — S72.001A CLOSED FRACTURE OF RIGHT HIP, INITIAL ENCOUNTER (HCC): Primary | ICD-10-CM

## 2019-06-22 PROCEDURE — 99283 EMERGENCY DEPT VISIT LOW MDM: CPT

## 2019-06-22 PROCEDURE — 6370000000 HC RX 637 (ALT 250 FOR IP): Performed by: EMERGENCY MEDICINE

## 2019-06-22 RX ORDER — ACETAMINOPHEN 325 MG/1
325 TABLET ORAL EVERY 6 HOURS PRN
Qty: 10 TABLET | Refills: 0 | Status: SHIPPED | OUTPATIENT
Start: 2019-06-22 | End: 2020-07-19

## 2019-06-22 RX ORDER — OXYCODONE HYDROCHLORIDE AND ACETAMINOPHEN 5; 325 MG/1; MG/1
1 TABLET ORAL ONCE
Status: COMPLETED | OUTPATIENT
Start: 2019-06-22 | End: 2019-06-22

## 2019-06-22 RX ADMIN — OXYCODONE HYDROCHLORIDE AND ACETAMINOPHEN 1 TABLET: 5; 325 TABLET ORAL at 22:04

## 2019-06-22 ASSESSMENT — PAIN SCALES - GENERAL
PAINLEVEL_OUTOF10: 8
PAINLEVEL_OUTOF10: 8

## 2019-06-22 ASSESSMENT — PAIN DESCRIPTION - FREQUENCY: FREQUENCY: CONTINUOUS

## 2019-06-22 ASSESSMENT — PAIN DESCRIPTION - ORIENTATION: ORIENTATION: RIGHT

## 2019-06-22 ASSESSMENT — PAIN DESCRIPTION - DESCRIPTORS: DESCRIPTORS: SHARP;THROBBING

## 2019-06-22 ASSESSMENT — PAIN DESCRIPTION - LOCATION: LOCATION: HIP

## 2019-06-23 ASSESSMENT — ENCOUNTER SYMPTOMS
NAUSEA: 0
DIARRHEA: 0
VOMITING: 0
SHORTNESS OF BREATH: 0
ABDOMINAL PAIN: 1

## 2019-06-23 NOTE — ED PROVIDER NOTES
[facility]  ED  Emergency Department Encounter  Emergency Medicine Resident     Patient Name: Tabby Gallagher  MRN: [id number]  Adonaygfurt: 1955  Date of evaluation: 6/22/19  PCP:  No primary care provider on file. CHIEF COMPLAINT       Chief Complaint   Patient presents with    Hip Pain     Right hip pain. Pt stated she was here 2 days ago and was told its broken. Pt stated she is in pain.  Abdominal Pain     pt stated she has not had a bowel movement in 2 days        HISTORY OF PRESENT ILLNESS  (Location/Symptom, Timing/Onset, Context/Setting, Quality, Duration, Modifying Factors, Severity.)      Emi Daniels is a 75 y.o. female who presents with a chief complaint of right hip pain and generalized abdominal pain. Patient was recently discharged from the observation unit for the same complaints. Patient reports that her pain is unchanged compared to when she was discharged. Patient is requesting pain medication and to be discharged. Patient has a known right hip avascular necrosis with collapse. Patient was encouraged to follow-up with orthopedic surgery, however she has not yet made an appointment to do so. Patient was also evaluate by gastroenterology prior to discharge, and they recommended outpatient follow-up, which she has not yet arranged for. Patient is passing stool. No nausea, vomiting, diarrhea. Patient reports she is still able to ambulate with a walker. Patient denies any new falls or traumas.     PAST MEDICAL / SURGICAL / SOCIAL / FAMILY HISTORY      has a past medical history of Appendicitis, CHF (congestive heart failure) ([address] Chronic respiratory failure with hypoxia and hypercapnia (HCC), Chronic rhinitis, Cigarette smoker, COPD (chronic obstructive pulmonary disease) ([address] Depression, Dysphagia, Essential hypertension, GERD (gastroesophageal reflux disease), Heart failure, diastolic, with acute decompensation ([address] Hepatitis C, chronic ([address] History of is no tenderness. There is no rebound and no guarding. Musculoskeletal: Normal range of motion. She exhibits no deformity. Pain with palpation of right greater trochanter   Neurological: She is alert and oriented to person, place, and time. PINTO   Skin: Skin is warm and dry. No rash (on exposed skin) noted. She is not diaphoretic. Psychiatric: She has a normal mood and affect. Her behavior is normal.   Nursing note and vitals reviewed. WORK-UP     PLAN (LABS / IMAGING /EKG):  No orders of the defined types were placed in this encounter. MEDICATIONS ORDERED:  Orders Placed This Encounter   Medications    oxyCODONE-acetaminophen (PERCOCET) 5-325 MG per tablet 1 tablet    acetaminophen (TYLENOL) 325 MG tablet     Sig: Take 1 tablet by mouth every 6 hours as needed for Pain     Dispense:  10 tablet     Refill:  0       DIAGNOSTIC RESULTS / EMERGENCY DEPARTMENT COURSE /MDM / DIFFERENTIAL DIAGNOSIS     LABS:  No results found for this visit on 06/22/19. RADIOLOGY:  None    EKG  None    All EKG's are interpreted by the Emergency Department Physician who either signs or Co-signs this chart in the absence of a cardiologist.    DIFFERENTIAL DIAGNOSIS:  Known hip fracture, new trauma, appendicitis, cholecystitis    EMERGENCY DEPARTMENT COURSE & MDM:  75 y.o. female presents with a chief complaint of abdominal pain and right hip pain. Vitals are stable. Patient is well-appearing and in no acute distress. Patient is asking for pain medication and then to be discharged. Patient has a known hip fracture and has not followed up with orthopedic surgery as recommended. Patient reports her abdominal pain is unchanged, and given that her abdomen is completely soft and nontender, my clinical suspicion for acute life-threatening pathology including but not limited to appendicitis and cholecystitis is very low.  Given recent admission and unchanged pain and reassuring abdominal exam, will not get any additional

## 2019-06-23 NOTE — DISCHARGE SUMMARY
tablet  Take 1 tablet by mouth 2 times daily             ipratropium-albuterol (DUONEB) 0.5-2.5 (3) MG/3ML SOLN nebulizer solution  Inhale 3 mLs into the lungs every 4 hours as needed for Shortness of Breath             melatonin 1 MG tablet  Take 1 tablet by mouth nightly as needed for Sleep             mometasone-formoterol (DULERA) 100-5 MCG/ACT inhaler  Inhale 2 puffs into the lungs 2 times daily             Multiple Vitamins-Minerals (THERAPEUTIC MULTIVITAMIN-MINERALS) tablet  Take 1 tablet by mouth daily             pantoprazole (PROTONIX) 40 MG tablet  Take 1 tablet by mouth 2 times daily (before meals)             vitamin B-1 100 MG tablet  Take 1 tablet by mouth daily             vitamin B-12 250 MCG tablet  Take 1 tablet by mouth daily                 Diet:  No diet orders on file , Advance as tolerated     Activity:  As tolerated    Consultants: IP CONSULT TO ORTHOPEDIC SURGERY  IP CONSULT TO GI  IP CONSULT TO HOME CARE NEEDS    Procedures:  Not indicated     Diagnostic Test:   Results for orders placed or performed during the hospital encounter of 06/19/19   Culture Blood #1   Result Value Ref Range    Specimen Description . BLOOD     Special Requests  LT FA 10 ML     Culture NO GROWTH 4 DAYS    Culture Blood #1   Result Value Ref Range    Specimen Description . BLOOD     Special Requests  LT AC 4 ML     Culture NO GROWTH 4 DAYS    Urine Culture   Result Value Ref Range    Specimen Description . URINE     Special Requests NOT REPORTED     Culture NO SIGNIFICANT GROWTH    CBC WITH AUTO DIFFERENTIAL   Result Value Ref Range    WBC 6.7 3.5 - 11.3 k/uL    RBC 3.81 (L) 3.95 - 5.11 m/uL    Hemoglobin 11.7 (L) 11.9 - 15.1 g/dL    Hematocrit 38.5 36.3 - 47.1 %    .0 82.6 - 102.9 fL    MCH 30.7 25.2 - 33.5 pg    MCHC 30.4 28.4 - 34.8 g/dL    RDW 18.5 (H) 11.8 - 14.4 %    Platelets See Reflexed IPF Result 138 - 453 k/uL    MPV NOT REPORTED 8.1 - 13.5 fL    NRBC Automated 0.0 0.0 per 100 WBC    Differential Type NOT REPORTED     WBC Morphology NOT REPORTED     RBC Morphology ANISOCYTOSIS PRESENT     Platelet Estimate NOT REPORTED     Seg Neutrophils 61 36 - 65 %    Lymphocytes 31 24 - 43 %    Monocytes 6 3 - 12 %    Eosinophils % 2 1 - 4 %    Basophils 0 0 - 2 %    Immature Granulocytes 1 (H) 0 %    Segs Absolute 4.07 1.50 - 8.10 k/uL    Absolute Lymph # 2.05 1.10 - 3.70 k/uL    Absolute Mono # 0.39 0.10 - 1.20 k/uL    Absolute Eos # 0.12 0.00 - 0.44 k/uL    Basophils # <0.03 0.00 - 0.20 k/uL    Absolute Immature Granulocyte 0.06 0.00 - 0.30 k/uL   Comprehensive Metabolic Panel   Result Value Ref Range    Glucose 74 70 - 99 mg/dL    BUN 8 8 - 23 mg/dL    CREATININE 0.67 0.50 - 0.90 mg/dL    Bun/Cre Ratio NOT REPORTED 9 - 20    Calcium 10.4 8.6 - 10.4 mg/dL    Sodium 134 (L) 135 - 144 mmol/L    Potassium 5.2 3.7 - 5.3 mmol/L    Chloride 100 98 - 107 mmol/L    CO2 14 (L) 20 - 31 mmol/L    Anion Gap 20 (H) 9 - 17 mmol/L    Alkaline Phosphatase 80 35 - 104 U/L    ALT 8 5 - 33 U/L    AST 29 <32 U/L    Total Bilirubin 0.58 0.3 - 1.2 mg/dL    Total Protein 9.3 (H) 6.4 - 8.3 g/dL    Alb 4.6 3.5 - 5.2 g/dL    Albumin/Globulin Ratio 1.0 1.0 - 2.5    GFR Non-African American >60 >60 mL/min    GFR African American >60 >60 mL/min    GFR Comment          GFR Staging NOT REPORTED    LIPASE   Result Value Ref Range    Lipase 40 13 - 60 U/L   Urinalysis Reflex to Culture   Result Value Ref Range    Color, UA YELLOW YELLOW    Turbidity UA CLEAR CLEAR    Glucose, Ur NEGATIVE NEGATIVE    Bilirubin Urine NEGATIVE NEGATIVE    Ketones, Urine MODERATE (A) NEGATIVE    Specific Gravity, UA 1.046 (H) 1.005 - 1.030    Urine Hgb NEGATIVE NEGATIVE    pH, UA 5.0 5.0 - 8.0    Protein, UA NEGATIVE NEGATIVE    Urobilinogen, Urine Normal Normal    Nitrite, Urine NEGATIVE NEGATIVE    Leukocyte Esterase, Urine SMALL (A) NEGATIVE    Urinalysis Comments NOT REPORTED    Protime-INR   Result Value Ref Range    Protime 10.4 9.0 - 12.0 sec    INR 1.0    APTT Result Value Ref Range    PTT 22.9 20.5 - 30.5 sec   Immature Platelet Fraction   Result Value Ref Range    Platelet, Fluorescence Platelet clumps present, count appears adequate. 138 - 453 k/uL   SPECIMEN REJECTION   Result Value Ref Range    Specimen Source . BLOOD     Ordered Test LACWB     Reason for Rejection       Unable to perform testing: Specimen quantity not sufficient.    - NOT REPORTED    Lactic Acid, Whole Blood   Result Value Ref Range    Lactic Acid, Whole Blood 0.9 0.7 - 2.1 mmol/L   Microscopic Urinalysis   Result Value Ref Range    -          WBC, UA 5 TO 10 0 - 5 /HPF    RBC, UA 0 TO 2 0 - 4 /HPF    Casts UA  0 - 8 /LPF     2 TO 5 HYALINE Reference range defined for non-centrifuged specimen. Crystals UA NOT REPORTED None /HPF    Epithelial Cells UA 5 TO 10 0 - 5 /HPF    Renal Epithelial, Urine NOT REPORTED 0 /HPF    Bacteria, UA NOT REPORTED None    Mucus, UA NOT REPORTED None    Trichomonas, UA NOT REPORTED None    Amorphous, UA NOT REPORTED None    Other Observations UA NOT REPORTED NOT REQ.     Yeast, UA NOT REPORTED None   Troponin   Result Value Ref Range    Troponin, High Sensitivity 11 0 - 14 ng/L    Troponin T NOT REPORTED <0.03 ng/mL    Troponin Interp NOT REPORTED    Basic Metabolic Panel   Result Value Ref Range    Glucose 74 70 - 99 mg/dL    BUN 6 (L) 8 - 23 mg/dL    CREATININE 0.68 0.50 - 0.90 mg/dL    Bun/Cre Ratio NOT REPORTED 9 - 20    Calcium 8.5 (L) 8.6 - 10.4 mg/dL    Sodium 142 135 - 144 mmol/L    Potassium 4.0 3.7 - 5.3 mmol/L    Chloride 111 (H) 98 - 107 mmol/L    CO2 17 (L) 20 - 31 mmol/L    Anion Gap 14 9 - 17 mmol/L    GFR Non-African American >60 >60 mL/min    GFR African American >60 >60 mL/min    GFR Comment          GFR Staging NOT REPORTED    EKG 12 Lead   Result Value Ref Range    Ventricular Rate 89 BPM    Atrial Rate 89 BPM    P-R Interval 174 ms    QRS Duration 68 ms    Q-T Interval 344 ms    QTc Calculation (Bazett) 418 ms    P Axis 49 degrees    R Axis 35 degrees    T Axis 46 degrees     Xr Chest Decubitus Right    Result Date: 6/20/2019  EXAMINATION: DECUBITUS XRAY VIEW(S) OF THE CHEST 6/20/2019 10:36 am COMPARISON: Chest x-ray done earlier same day. HISTORY: ORDERING SYSTEM PROVIDED HISTORY: R/o possible pneumothorax seen on portable CXR TECHNOLOGIST PROVIDED HISTORY: Right side up please R/o possible pneumothorax seen on portable CXR FINDINGS: Left side down decubitus chest is submitted. There is no evidence of right-sided pneumothorax. The previously noted linear finding in the right chest represents a skin fold. No evidence of right-sided pneumothorax. Ct Abdomen Pelvis W Iv Contrast Additional Contrast? None    Result Date: 6/19/2019  EXAMINATION: CT OF THE ABDOMEN AND PELVIS WITH CONTRAST 6/19/2019 9:46 pm TECHNIQUE: CT of the abdomen and pelvis was performed with the administration of intravenous contrast. Multiplanar reformatted images are provided for review. Dose modulation, iterative reconstruction, and/or weight based adjustment of the mA/kV was utilized to reduce the radiation dose to as low as reasonably achievable. COMPARISON: January 7. HISTORY: ORDERING SYSTEM PROVIDED HISTORY: abdominal pain TECHNOLOGIST PROVIDED HISTORY: Ordering Physician Provided Reason for Exam: ABD PAIN Acuity: Unknown Type of Exam: Unknown Relevant Medical/Surgical History: HTN,COPD,CHF,APPY,,GASTRIC BYPASS FINDINGS: Lower Chest: Small pericardial lymph nodes are again noted. Dependent atelectasis is noted bilaterally. Organs: Low-density in the anterior aspect of the liver near the falciform ligament is a normal variant in liver attenuation/enhancement. There is a small amount of questionable low density adjacent to the gallbladder fossa. Developing fatty infiltration is favored. Intrahepatic ductal dilatation is noted. There is no gallstone. Mild common duct prominence is noted. There is no stone in the duct. Spleen is small without focal lesion. Pancreatic ductal prominence is noted. There is no focal pancreatic lesion. Adrenal glands are normal.  Multiple high density foci are noted in both kidneys most likely contrast.  Small calculi cannot be excluded. There is no hydronephrosis. Renal scarring in the upper pole on the left is noted. GI/Bowel: Stool is noted in the colon. Colonic redundancy is noted. There is no adjacent inflammatory change. No disproportionate small-bowel distention is noted. There is no pneumatosis. Pelvis: Mild heterogeneity of the uterus is noted. This raises the question of fibroids. No focal abnormality of the urinary bladder is noted. Redundancy of the distal colon is noted. Proximal sigmoid colon is incompletely distended. Redundancy of the sigmoid colon is noted. Appendix is at the upper limits of normal in caliber measuring approximately 7 mm transverse. There is no adjacent inflammatory change. Peritoneum/Retroperitoneum: Vascular calcifications are noted. No adenopathy is noted. Mesenteric vessels are patent. Bones/Soft Tissues: New heterogeneous low-density is noted adjacent to the margins of the right hip joint. No enlarged inguinal lymph nodes are noted. Canal detail in the lumbar region is limited. A few small disc bulges are noted in the lower lumbar spine. Sclerotic density in the left femoral head raises the question of avascular necrosis. There is new collapse involving the right femoral head. There is heterogeneous sclerosis in the proximal femur at the level of the lesser trochanter, unchanged. Scattered small round lucent foci are noted in the lumbar spine. These are similar compared to the prior. New collapse of the right femoral head with adjacent heterogeneous low-density in the tissues. This may be due to avascular necrosis and associated fracture collapse. Septic joint would be difficult to exclude. Findings in the left hip raise the question of avascular necrosis without collapse. There may be a tiny curvilinear subcapital fracture on coronal images 49 and 50. Nonspecific distention of the common duct without stone. Minimal intrahepatic ductal dilatation is noted. Incomplete sigmoid distention. This may be due to simple underdistention. Mild colitis would be difficult to exclude although there is no inflammatory change Heterogeneity of the uterus could be due to timing of the bolus or fibroid. Xr Chest Portable    Result Date: 6/20/2019  EXAMINATION: ONE XRAY VIEW OF THE CHEST 6/20/2019 9:33 am COMPARISON: 06/19/2019 and 05/12/2019 HISTORY: ORDERING SYSTEM PROVIDED HISTORY: Productive cough TECHNOLOGIST PROVIDED HISTORY: Productive cough FINDINGS: Heart is not enlarged. Chronic pulmonary changes without focal airspace consolidation or pleural effusion. Cysts in the right upper chest, a curvilinear line through the upper lung parenchyma is felt represent a skin fold given aeration of lung beyond this line; however, a small or loculated pneumothorax is not entirely excluded. Consider decubitus views or short interval follow-up. Curvilinear lucency along the right upper lung mass most likely skin fold but small pneumothorax is not excluded. Decubitus views or short interval follow-up is advised. The findings were sent to the Radiology Results [address] at 9:52 am on 6/20/2019to be communicated to a licensed caregiver. Mri Abdomen Wo Contrast Mrcp    Result Date: 6/20/2019  EXAMINATION: MRI OF THE ABDOMEN WITHOUT CONTRAST AND MRCP 6/20/2019 3:34 pm TECHNIQUE: Multiplanar multisequence MRI of the abdomen was performed without the administration of intravenous contrast.  After initial T2 axial and coronal images, thick slab, thin slab and 3D coronal MRCP sequences were obtained without the administration of intravenous contrast.  MIP images are provided for review. COMPARISON: CT abdomen and pelvis 06/19/2019.   Abdominal MRI November 21, 2011 HISTORY: [address] PROVIDED HISTORY: Abnormal CT - Dilated CBD and intrahepatic ductal dilation FINDINGS: ABDOMEN: Findings consistent with dependent subsegmental atelectasis in the posterior left lower lobe. Signal characteristics in the liver are noted suggestive of iron deposition. No evidence for iron deposition in the spleen, pancreas or bone marrow. No cirrhotic features identified. No focal liver lesion identified. The pancreas, spleen, adrenals and kidneys reveal no signal abnormality. No ascites. No lymphadenopathy. The aorta is normal in caliber. The visualized bowel is normal in caliber. No signal abnormality appreciated. Flattening of the right femoral head is partially visualized. There is no marrow edema or soft tissue edema appreciated on this limited evaluation of this area. MRCP: Gallbladder: Unremarkable. Bile Ducts: The common duct is mildly prominent measuring 8 mm proximally and tapering at the ampulla. Overall this appearance is unchanged since 2011. No choledocholithiasis. Pancreatic Duct: No pancreatic duct dilatation. Common bile duct at the upper limits of normal.  No cholelithiasis, choledocholithiasis, evidence for pancreatic mass or pancreatic duct dilatation. Findings suggestive of mild iron deposition in the liver. Xr Hip 2-3 Vw W Pelvis Right    Result Date: 6/20/2019  EXAMINATION: ONE XRAY VIEW OF THE PELVIS AND TWO XRAY VIEWS RIGHT HIP 6/20/2019 2:42 am COMPARISON: April 21, 2019. HISTORY: ORDERING SYSTEM PROVIDED HISTORY: Right hip AVN TECHNOLOGIST PROVIDED HISTORY: AP and cross-table lateral of the hip please, thank you Right hip AVN Ordering Physician Provided Reason for Exam: Right hip pain. Acuity: Acute Type of Exam: Unknown FINDINGS: Frontal view of the pelvis as well as frontal and cross-table lateral views of the right hip.  Redemonstration of findings of right proximal femoral avascular necrosis with mildly progressed femoral head collapse compared to radiographs done April 21, 2019.  Similar appearing osseous sclerosis in the left femoral head without significant collapse. Stable patchy sclerosis in the right proximal femoral intertrochanteric region. Right greater than left hip degenerative osseous changes. Excreted contrast material in the urinary bladder. Osteopenia. Normal alignment of the symphysis pubis and bilateral sacroiliac joints. Incompletely characterized lumbar spondylosis. Redemonstration of findings of right proximal femoral avascular necrosis with mildly progressed femoral head collapse compared to radiographs done April 21, 2019. Similar-appearing osseous sclerosis in the left femoral head without significant collapse. Physical Exam:       CONSTITUTIONAL: AOx4, no apparent distress, appears stated age    HEAD: normocephalic, atraumatic   EYES: PERRLA, EOMI, no scleral icterus   ENT: moist mucous membranes, uvula midline   NECK: supple, symmetric   BACK: symmetric   LUNGS: clear to auscultation bilaterally   CARDIOVASCULAR: regular rate and rhythm, no murmurs, rubs or gallops   ABDOMEN: soft, minimally tender to palpation in right upper quadrant, right lower quadrant and left lower quadrant, non-distended with normal active bowel sounds, negative Tompkins sign, no guarding, rigidity, rebound tenderness   NEUROLOGIC:  MAEx4, no focal sensory or motor deficits   MUSCULOSKELETAL: no clubbing, cyanosis or edema   SKIN: no rash or wounds        Hospital Course:  Clinical course has improved, labs and imaging reviewed. Emi Daniels originally presented to the hospital on 6/19/2019  8:10 PM. with acute right lower quadrant and left lower quadrant abdominal pain due to unknown etiology versus avascular necrosis of right hip. At that time it was determined that She required further observation and orthopedic surgery and GI consult. She was admitted and labs and imaging were followed daily. Imaging results as above.   She is medically stable to be discharged. Disposition: Home    Patient stated that they will not drive themselves home from the hospital if they have gotten pain killers/ narcotics earlier that day and that they will arrange for transportation on their own or work with the  for a ride. Patient counseled NOT to drive while under the influence of narcotics/ pain killers. Condition: Good    Patient stable and ready for discharge home. I have discussed plan of care with patient and they are in understanding. They were instructed to read discharge paperwork. All of their questions and concerns were addressed. Time Spent: 0 day      --  Amina Weber DO  Emergency Medicine Resident Physician    This dictation was generated by voice recognition computer software. Although all attempts are made to edit the dictation for accuracy, there may be errors in the transcription that are not intended.

## 2019-06-23 NOTE — ED PROVIDER NOTES
[facility] ED     Emergency Department     Faculty Attestation    I performed a history and physical examination of the patient and discussed management with the resident. I reviewed the residents note and agree with the documented findings and plan of care. Any areas of disagreement are noted on the chart. I was personally present for the key portions of any procedures. I have documented in the chart those procedures where I was not present during the key portions. I have reviewed the emergency nurses triage note. I agree with the chief complaint, past medical history, past surgical history, allergies, medications, social and family history as documented unless otherwise noted below. For Physician Assistant/ Nurse Practitioner cases/documentation I have personally evaluated this patient and have completed at least one if not all key elements of the E/M (history, physical exam, and MDM). Additional findings are as noted. Patient with recent admission for avascular necrosis of the right hip as well as abdominal pain presents complaining of continued hip and abdominal pain. Patient was worked up by both orthopedic surgery and GI. Patient says she is here today because she continues to have pain and does not have anything to take for it at home. Denies any falls or new injuries. Patient says she just wants some pain medication and then to go home. On exam, patient is resting comfortably in the bed. Lungs are clear to auscultation bilaterally heart sounds are normal.  Abdomen is soft and nontender. No rebound or guarding is present. There is no deformity, erythema, warmth, or swelling to the right hip. I do not feel that any further work-up is indicated at this time. Will treat patient's pain.       Soledad Jaimes MD  Attending Emergency  Physician              Juma Hernández MD  06/22/19 [id number]

## 2019-06-25 LAB
CULTURE: NORMAL
CULTURE: NORMAL
Lab: NORMAL
Lab: NORMAL
SPECIMEN DESCRIPTION: NORMAL
SPECIMEN DESCRIPTION: NORMAL

## 2019-07-08 ENCOUNTER — OFFICE VISIT (OUTPATIENT)
Dept: ORTHOPEDIC SURGERY | Age: 64
End: 2019-07-08
Payer: MEDICARE

## 2019-07-08 VITALS — HEIGHT: 60 IN | BODY MASS INDEX: 20.65 KG/M2 | WEIGHT: 105.16 LBS

## 2019-07-08 DIAGNOSIS — M25.551 RIGHT HIP PAIN: Primary | ICD-10-CM

## 2019-07-08 DIAGNOSIS — M87.051 AVASCULAR NECROSIS OF BONE OF RIGHT HIP (HCC): Primary | ICD-10-CM

## 2019-07-08 PROCEDURE — 99203 OFFICE O/P NEW LOW 30 MIN: CPT | Performed by: STUDENT IN AN ORGANIZED HEALTH CARE EDUCATION/TRAINING PROGRAM

## 2019-07-08 RX ORDER — TRAMADOL HYDROCHLORIDE 50 MG/1
50 TABLET ORAL EVERY 4 HOURS PRN
Qty: 30 TABLET | Refills: 0 | Status: SHIPPED | OUTPATIENT
Start: 2019-07-08 | End: 2019-07-13

## 2019-07-08 NOTE — PROGRESS NOTES
hypertension 2018    GERD (gastroesophageal reflux disease)     Heart failure, diastolic, with acute decompensation ([facility] 2018    Hepatitis C, chronic (HCC)     History of smoking at least 1 pack per day for at least 30 years     Hyperlipidemia     Hypertension     Migraine     Moderate COPD (chronic obstructive pulmonary disease) (HCC)     Multiple transfusions     Osteoarthritis     hands    Pneumonia     Scoliosis     Substance abuse ([facility]     ivda and cocaine abuse in past       Past Surgical History:    Past Surgical History:   Procedure Laterality Date    APPENDECTOMY  2017     SECTION      GASTRIC BYPASS SURGERY      LAPAROSCOPIC APPENDECTOMY N/A 3/27/2017    APPENDECTOMY LAPAROSCOPIC performed by Genie Barahona MD at [address] [place] [address]  2002    hgsil    VT COLONOSCOPY W/BIOPSY SINGLE/MULTIPLE N/A 11/10/2018    COLONOSCOPY WITH BIOPSY performed by Manda Oneill MD at [facility]    VT EGD TRANSORAL BIOPSY SINGLE/MULTIPLE N/A 2018    EGD ESOPHAGOGASTRODUODENOSCOPY performed by Rick Sharpe MD at [address] [place] [address]      oesophagitis, candidiasis    UPPER GASTROINTESTINAL ENDOSCOPY  2018    UPPER GASTROINTESTINAL ENDOSCOPY N/A 2018    EGD CONTROL HEMORRHAGE performed by Rick Sharpe MD at [facility] OR       Current Medications:   Current Outpatient Medications   Medication Sig Dispense Refill    traMADol (ULTRAM) 50 MG tablet Take 1 tablet by mouth every 4 hours as needed for Pain for up to 5 days. Intended supply: 5 days.  Take lowest dose possible to manage pain 30 tablet 0    acetaminophen (TYLENOL) 325 MG tablet Take 1 tablet by mouth every 6 hours as needed for Pain 10 tablet 0    benzonatate (TESSALON PERLES) 100 MG capsule Take 1 capsule by mouth 3 times daily as needed for Cough 10 capsule 0    carvedilol (COREG) 6.25 MG tablet Take 1 tablet by mouth 2 times daily (with meals) 60 tablet 3    ferrous intact  Mouth: Oral mucosa moist. No perioral lesions  Pulm: Respirations unlabored and regular. Skin: warm, well perfused  Psych:   Patient has good fund of knowledge and displays understanging of exam, diagnosis, and plan. MSK: Right hip: Tenderness palpation on the groin. Positive logroll. Pain with FADIR/NICOL. Unable to bear weight on her right leg    Radiology:   History:   R hip pain    Findings:   Views: AP pelvis/frog-leg lateral right hip  Findings: Demonstrates severe femoral head collapse from avascular necrosis. This appears worse from previous images on 20 June 2019. Also notable encapsulated area of calcification in the peritrochanteric region with central lucency, likely enchondroma. No other notable fractures. Significant osteopenia noted throughout the pelvis and femur  Previous comparison films: 20 Jun 2019    Impression:  1. R hip with severe osteonecrosis  2. Enchondroma       ASSESSMENT:     1. Avascular necrosis of bone of right hip (HCC)         PLAN:  Discussed etiologies and natural histories of R hip AVN. At this point her AVN is so progressed, we feel she will not benefit from injections. We feel she could benefit from a right total hip arthroplasty, though due to her complex medical history she will require medical clearance prior to moving forward with surgery. We will at this time prescribe Ultram for pain control. Patient is to follow-up when she obtains dental, cardiac, medical clearance. We will plan for total hip at that time. Discussed with patient and she is agreeable. Call the office immediately with any problems. Orders Placed This Encounter   Medications    traMADol (ULTRAM) 50 MG tablet     Sig: Take 1 tablet by mouth every 4 hours as needed for Pain for up to 5 days. Intended supply: 5 days.  Take lowest dose possible to manage pain     Dispense:  30 tablet     Refill:  0     Reduce doses taken as pain becomes manageable       No orders of the

## 2019-07-08 NOTE — LETTER
[facility] [id number]  Phone: [phone]  Fax: [phone]        July 8, 2019     Patient: Fracnes Baer   YOB: 1955   Date of Visit: 7/8/2019       To Whom It May Concern:    Emi Daniels will need the following prior to moving forward for a right total hip replacement:    Medical clearance per primary care physician  Cardiology clearance  Dental clearance      If you have any questions or concerns, please don't hesitate to call.     Sincerely,          Alondra Breaux DO

## 2019-07-20 ENCOUNTER — HOSPITAL ENCOUNTER (INPATIENT)
Age: 64
LOS: 2 days | Discharge: HOME | DRG: 469 | End: 2019-07-22
Attending: EMERGENCY MEDICINE | Admitting: INTERNAL MEDICINE
Payer: MEDICARE

## 2019-07-20 ENCOUNTER — APPOINTMENT (OUTPATIENT)
Dept: GENERAL RADIOLOGY | Age: 64
DRG: 469 | End: 2019-07-20
Payer: MEDICARE

## 2019-07-20 ENCOUNTER — APPOINTMENT (OUTPATIENT)
Dept: CT IMAGING | Age: 64
DRG: 469 | End: 2019-07-20
Payer: MEDICARE

## 2019-07-20 DIAGNOSIS — N17.9 ACUTE KIDNEY INJURY (HCC): ICD-10-CM

## 2019-07-20 DIAGNOSIS — R55 SYNCOPE AND COLLAPSE: Primary | ICD-10-CM

## 2019-07-20 LAB
-: NORMAL
ABSOLUTE EOS #: 0.08 K/UL (ref 0–0.44)
ABSOLUTE IMMATURE GRANULOCYTE: 0.07 K/UL (ref 0–0.3)
ABSOLUTE LYMPH #: 1.4 K/UL (ref 1.1–3.7)
ABSOLUTE MONO #: 0.87 K/UL (ref 0.1–1.2)
ALBUMIN SERPL-MCNC: 4.1 G/DL (ref 3.5–5.2)
ALBUMIN/GLOBULIN RATIO: 1.1 (ref 1–2.5)
ALP BLD-CCNC: 64 U/L (ref 35–104)
ALT SERPL-CCNC: 7 U/L (ref 5–33)
AMORPHOUS: NORMAL
ANION GAP SERPL CALCULATED.3IONS-SCNC: 19 MMOL/L (ref 9–17)
AST SERPL-CCNC: 22 U/L
BACTERIA: NORMAL
BASOPHILS # BLD: 0 % (ref 0–2)
BASOPHILS ABSOLUTE: <0.03 K/UL (ref 0–0.2)
BILIRUB SERPL-MCNC: 0.48 MG/DL (ref 0.3–1.2)
BILIRUBIN URINE: NEGATIVE
BUN BLDV-MCNC: 11 MG/DL (ref 8–23)
BUN/CREAT BLD: ABNORMAL (ref 9–20)
CALCIUM SERPL-MCNC: 9.7 MG/DL (ref 8.6–10.4)
CASTS UA: NORMAL /LPF (ref 0–2)
CASTS UA: NORMAL /LPF (ref 0–2)
CHLORIDE BLD-SCNC: 103 MMOL/L (ref 98–107)
CO2: 19 MMOL/L (ref 20–31)
COLOR: ABNORMAL
COMMENT UA: ABNORMAL
CREAT SERPL-MCNC: 1.35 MG/DL (ref 0.5–0.9)
CREATININE URINE: 298.6 MG/DL (ref 28–217)
CRYSTALS, UA: NORMAL /HPF
CULTURE: NORMAL
DIFFERENTIAL TYPE: ABNORMAL
EOSINOPHILS RELATIVE PERCENT: 1 % (ref 1–4)
EPITHELIAL CELLS UA: NORMAL /HPF (ref 0–5)
GFR AFRICAN AMERICAN: 48 ML/MIN
GFR NON-AFRICAN AMERICAN: 39 ML/MIN
GFR SERPL CREATININE-BSD FRML MDRD: ABNORMAL ML/MIN/{1.73_M2}
GFR SERPL CREATININE-BSD FRML MDRD: ABNORMAL ML/MIN/{1.73_M2}
GLUCOSE BLD-MCNC: 223 MG/DL (ref 70–99)
GLUCOSE URINE: NEGATIVE
HCT VFR BLD CALC: 38.8 % (ref 36.3–47.1)
HEMOGLOBIN: 11.8 G/DL (ref 11.9–15.1)
IMMATURE GRANULOCYTES: 1 %
KETONES, URINE: ABNORMAL
LEUKOCYTE ESTERASE, URINE: ABNORMAL
LYMPHOCYTES # BLD: 21 % (ref 24–43)
Lab: NORMAL
MCH RBC QN AUTO: 28.5 PG (ref 25.2–33.5)
MCHC RBC AUTO-ENTMCNC: 30.4 G/DL (ref 28.4–34.8)
MCV RBC AUTO: 93.7 FL (ref 82.6–102.9)
MONOCYTES # BLD: 13 % (ref 3–12)
MUCUS: NORMAL
MYOGLOBIN: 214 NG/ML (ref 25–58)
NITRITE, URINE: NEGATIVE
NRBC AUTOMATED: 0 PER 100 WBC
OSMOLALITY URINE: 525 MOSM/KG (ref 80–1300)
OTHER OBSERVATIONS UA: NORMAL
PDW BLD-RTO: 17.9 % (ref 11.8–14.4)
PH UA: 5.5 (ref 5–8)
PLATELET # BLD: 470 K/UL (ref 138–453)
PLATELET ESTIMATE: ABNORMAL
PMV BLD AUTO: 10.3 FL (ref 8.1–13.5)
POTASSIUM SERPL-SCNC: 3.9 MMOL/L (ref 3.7–5.3)
PROTEIN UA: ABNORMAL
RBC # BLD: 4.14 M/UL (ref 3.95–5.11)
RBC # BLD: ABNORMAL 10*6/UL
RBC UA: NORMAL /HPF (ref 0–2)
RENAL EPITHELIAL, UA: NORMAL /HPF
SEG NEUTROPHILS: 64 % (ref 36–65)
SEGMENTED NEUTROPHILS ABSOLUTE COUNT: 4.16 K/UL (ref 1.5–8.1)
SODIUM BLD-SCNC: 141 MMOL/L (ref 135–144)
SODIUM,UR: 24 MMOL/L
SPECIFIC GRAVITY UA: 1.02 (ref 1–1.03)
SPECIMEN DESCRIPTION: NORMAL
TOTAL CK: 44 U/L (ref 26–192)
TOTAL PROTEIN, URINE: 57 MG/DL
TOTAL PROTEIN: 7.9 G/DL (ref 6.4–8.3)
TRICHOMONAS: NORMAL
TROPONIN INTERP: ABNORMAL
TROPONIN T: ABNORMAL NG/ML
TROPONIN, HIGH SENSITIVITY: 15 NG/L (ref 0–14)
TROPONIN, HIGH SENSITIVITY: 17 NG/L (ref 0–14)
TROPONIN, HIGH SENSITIVITY: 20 NG/L (ref 0–14)
TURBIDITY: ABNORMAL
URINE HGB: NEGATIVE
URINE TOTAL PROTEIN CREATININE RATIO: 0.19 (ref 0–0.2)
UROBILINOGEN, URINE: NORMAL
WBC # BLD: 6.6 K/UL (ref 3.5–11.3)
WBC # BLD: ABNORMAL 10*3/UL
WBC UA: NORMAL /HPF (ref 0–5)
YEAST: NORMAL

## 2019-07-20 PROCEDURE — 2700000000 HC OXYGEN THERAPY PER DAY

## 2019-07-20 PROCEDURE — 36415 COLL VENOUS BLD VENIPUNCTURE: CPT

## 2019-07-20 PROCEDURE — 85025 COMPLETE CBC W/AUTO DIFF WBC: CPT

## 2019-07-20 PROCEDURE — 83036 HEMOGLOBIN GLYCOSYLATED A1C: CPT

## 2019-07-20 PROCEDURE — 71045 X-RAY EXAM CHEST 1 VIEW: CPT

## 2019-07-20 PROCEDURE — 84156 ASSAY OF PROTEIN URINE: CPT

## 2019-07-20 PROCEDURE — 80307 DRUG TEST PRSMV CHEM ANLYZR: CPT

## 2019-07-20 PROCEDURE — 6360000002 HC RX W HCPCS: Performed by: STUDENT IN AN ORGANIZED HEALTH CARE EDUCATION/TRAINING PROGRAM

## 2019-07-20 PROCEDURE — 2580000003 HC RX 258: Performed by: STUDENT IN AN ORGANIZED HEALTH CARE EDUCATION/TRAINING PROGRAM

## 2019-07-20 PROCEDURE — 96365 THER/PROPH/DIAG IV INF INIT: CPT

## 2019-07-20 PROCEDURE — 82570 ASSAY OF URINE CREATININE: CPT

## 2019-07-20 PROCEDURE — 81015 MICROSCOPIC EXAM OF URINE: CPT

## 2019-07-20 PROCEDURE — 83935 ASSAY OF URINE OSMOLALITY: CPT

## 2019-07-20 PROCEDURE — 94761 N-INVAS EAR/PLS OXIMETRY MLT: CPT

## 2019-07-20 PROCEDURE — 84484 ASSAY OF TROPONIN QUANT: CPT

## 2019-07-20 PROCEDURE — 1200000000 HC SEMI PRIVATE

## 2019-07-20 PROCEDURE — 70450 CT HEAD/BRAIN W/O DYE: CPT

## 2019-07-20 PROCEDURE — 99284 EMERGENCY DEPT VISIT MOD MDM: CPT

## 2019-07-20 PROCEDURE — 82550 ASSAY OF CK (CPK): CPT

## 2019-07-20 PROCEDURE — 6370000000 HC RX 637 (ALT 250 FOR IP): Performed by: STUDENT IN AN ORGANIZED HEALTH CARE EDUCATION/TRAINING PROGRAM

## 2019-07-20 PROCEDURE — 93005 ELECTROCARDIOGRAM TRACING: CPT

## 2019-07-20 PROCEDURE — 96366 THER/PROPH/DIAG IV INF ADDON: CPT

## 2019-07-20 PROCEDURE — 84300 ASSAY OF URINE SODIUM: CPT

## 2019-07-20 PROCEDURE — 94640 AIRWAY INHALATION TREATMENT: CPT

## 2019-07-20 PROCEDURE — 83874 ASSAY OF MYOGLOBIN: CPT

## 2019-07-20 PROCEDURE — 87086 URINE CULTURE/COLONY COUNT: CPT

## 2019-07-20 PROCEDURE — 80053 COMPREHEN METABOLIC PANEL: CPT

## 2019-07-20 PROCEDURE — 99223 1ST HOSP IP/OBS HIGH 75: CPT | Performed by: INTERNAL MEDICINE

## 2019-07-20 RX ORDER — ACETAMINOPHEN 325 MG/1
650 TABLET ORAL EVERY 4 HOURS PRN
Status: DISCONTINUED | OUTPATIENT
Start: 2019-07-20 | End: 2019-07-22 | Stop reason: HOSPADM

## 2019-07-20 RX ORDER — BUPRENORPHINE HYDROCHLORIDE AND NALOXONE HYDROCHLORIDE DIHYDRATE 8; 2 MG/1; MG/1
1 TABLET SUBLINGUAL DAILY
Status: DISCONTINUED | OUTPATIENT
Start: 2019-07-20 | End: 2019-07-22 | Stop reason: HOSPADM

## 2019-07-20 RX ORDER — GABAPENTIN 100 MG/1
100 CAPSULE ORAL 3 TIMES DAILY
Status: DISCONTINUED | OUTPATIENT
Start: 2019-07-20 | End: 2019-07-22 | Stop reason: HOSPADM

## 2019-07-20 RX ORDER — ONDANSETRON 2 MG/ML
4 INJECTION INTRAMUSCULAR; INTRAVENOUS EVERY 6 HOURS PRN
Status: DISCONTINUED | OUTPATIENT
Start: 2019-07-20 | End: 2019-07-22 | Stop reason: HOSPADM

## 2019-07-20 RX ORDER — SODIUM CHLORIDE, SODIUM LACTATE, POTASSIUM CHLORIDE, CALCIUM CHLORIDE 600; 310; 30; 20 MG/100ML; MG/100ML; MG/100ML; MG/100ML
INJECTION, SOLUTION INTRAVENOUS CONTINUOUS
Status: DISCONTINUED | OUTPATIENT
Start: 2019-07-20 | End: 2019-07-22

## 2019-07-20 RX ORDER — SODIUM CHLORIDE 0.9 % (FLUSH) 0.9 %
10 SYRINGE (ML) INJECTION EVERY 12 HOURS SCHEDULED
Status: DISCONTINUED | OUTPATIENT
Start: 2019-07-20 | End: 2019-07-22

## 2019-07-20 RX ORDER — DOCUSATE SODIUM 100 MG/1
100 CAPSULE, LIQUID FILLED ORAL 2 TIMES DAILY
Status: DISCONTINUED | OUTPATIENT
Start: 2019-07-20 | End: 2019-07-22 | Stop reason: HOSPADM

## 2019-07-20 RX ORDER — SODIUM CHLORIDE 0.9 % (FLUSH) 0.9 %
10 SYRINGE (ML) INJECTION PRN
Status: DISCONTINUED | OUTPATIENT
Start: 2019-07-20 | End: 2019-07-22 | Stop reason: HOSPADM

## 2019-07-20 RX ORDER — BUPRENORPHINE AND NALOXONE 12; 3 MG/1; MG/1
1 FILM, SOLUBLE BUCCAL; SUBLINGUAL DAILY
Status: DISCONTINUED | OUTPATIENT
Start: 2019-07-20 | End: 2019-07-20

## 2019-07-20 RX ORDER — FOLIC ACID 1 MG/1
1 TABLET ORAL DAILY
Status: DISCONTINUED | OUTPATIENT
Start: 2019-07-20 | End: 2019-07-22 | Stop reason: HOSPADM

## 2019-07-20 RX ORDER — IPRATROPIUM BROMIDE AND ALBUTEROL SULFATE 2.5; .5 MG/3ML; MG/3ML
1 SOLUTION RESPIRATORY (INHALATION) 2 TIMES DAILY
Status: DISCONTINUED | OUTPATIENT
Start: 2019-07-20 | End: 2019-07-22 | Stop reason: HOSPADM

## 2019-07-20 RX ORDER — HEPARIN SODIUM 5000 [USP'U]/ML
5000 INJECTION, SOLUTION INTRAVENOUS; SUBCUTANEOUS EVERY 8 HOURS SCHEDULED
Status: DISCONTINUED | OUTPATIENT
Start: 2019-07-20 | End: 2019-07-20 | Stop reason: SDUPTHER

## 2019-07-20 RX ORDER — HEPARIN SODIUM 5000 [USP'U]/ML
5000 INJECTION, SOLUTION INTRAVENOUS; SUBCUTANEOUS EVERY 8 HOURS SCHEDULED
Status: DISCONTINUED | OUTPATIENT
Start: 2019-07-20 | End: 2019-07-22 | Stop reason: HOSPADM

## 2019-07-20 RX ORDER — M-VIT,TX,IRON,MINS/CALC/FOLIC 27MG-0.4MG
1 TABLET ORAL DAILY
Status: DISCONTINUED | OUTPATIENT
Start: 2019-07-21 | End: 2019-07-22 | Stop reason: HOSPADM

## 2019-07-20 RX ORDER — SODIUM CHLORIDE, SODIUM LACTATE, POTASSIUM CHLORIDE, AND CALCIUM CHLORIDE .6; .31; .03; .02 G/100ML; G/100ML; G/100ML; G/100ML
1000 INJECTION, SOLUTION INTRAVENOUS ONCE
Status: COMPLETED | OUTPATIENT
Start: 2019-07-20 | End: 2019-07-20

## 2019-07-20 RX ORDER — BUPRENORPHINE HYDROCHLORIDE AND NALOXONE HYDROCHLORIDE DIHYDRATE 2; .5 MG/1; MG/1
2 TABLET SUBLINGUAL DAILY
Status: DISCONTINUED | OUTPATIENT
Start: 2019-07-20 | End: 2019-07-22 | Stop reason: HOSPADM

## 2019-07-20 RX ORDER — SODIUM CHLORIDE 0.9 % (FLUSH) 0.9 %
10 SYRINGE (ML) INJECTION EVERY 12 HOURS SCHEDULED
Status: DISCONTINUED | OUTPATIENT
Start: 2019-07-20 | End: 2019-07-22 | Stop reason: HOSPADM

## 2019-07-20 RX ORDER — THIAMINE MONONITRATE (VIT B1) 100 MG
100 TABLET ORAL DAILY
Status: DISCONTINUED | OUTPATIENT
Start: 2019-07-21 | End: 2019-07-22 | Stop reason: HOSPADM

## 2019-07-20 RX ORDER — MORPHINE SULFATE 4 MG/ML
4 INJECTION, SOLUTION INTRAMUSCULAR; INTRAVENOUS ONCE
Status: COMPLETED | OUTPATIENT
Start: 2019-07-20 | End: 2019-07-20

## 2019-07-20 RX ORDER — UREA 10 %
1 LOTION (ML) TOPICAL NIGHTLY PRN
Status: DISCONTINUED | OUTPATIENT
Start: 2019-07-20 | End: 2019-07-22 | Stop reason: HOSPADM

## 2019-07-20 RX ORDER — ALBUTEROL SULFATE 90 UG/1
1 AEROSOL, METERED RESPIRATORY (INHALATION) EVERY 6 HOURS PRN
Status: DISCONTINUED | OUTPATIENT
Start: 2019-07-20 | End: 2019-07-22 | Stop reason: HOSPADM

## 2019-07-20 RX ADMIN — IPRATROPIUM BROMIDE AND ALBUTEROL SULFATE 3 ML: .5; 3 SOLUTION RESPIRATORY (INHALATION) at 21:23

## 2019-07-20 RX ADMIN — DOCUSATE SODIUM 100 MG: 100 CAPSULE, LIQUID FILLED ORAL at 21:14

## 2019-07-20 RX ADMIN — SODIUM CHLORIDE, POTASSIUM CHLORIDE, SODIUM LACTATE AND CALCIUM CHLORIDE 1000 ML: 600; 310; 30; 20 INJECTION, SOLUTION INTRAVENOUS at 14:13

## 2019-07-20 RX ADMIN — HEPARIN SODIUM 5000 UNITS: 5000 INJECTION INTRAVENOUS; SUBCUTANEOUS at 21:14

## 2019-07-20 RX ADMIN — GABAPENTIN 100 MG: 100 CAPSULE ORAL at 17:13

## 2019-07-20 RX ADMIN — FOLIC ACID 1 MG: 1 TABLET ORAL at 17:13

## 2019-07-20 RX ADMIN — HEPARIN SODIUM 5000 UNITS: 5000 INJECTION INTRAVENOUS; SUBCUTANEOUS at 17:13

## 2019-07-20 RX ADMIN — BUPRENORPHINE AND NALOXONE 2 TABLET: 2; .5 TABLET SUBLINGUAL at 17:13

## 2019-07-20 RX ADMIN — MORPHINE SULFATE 4 MG: 4 INJECTION INTRAVENOUS at 14:06

## 2019-07-20 RX ADMIN — BUPRENORPHINE AND NALOXONE 1 TABLET: 8; 2 TABLET SUBLINGUAL at 17:13

## 2019-07-20 RX ADMIN — SODIUM CHLORIDE, POTASSIUM CHLORIDE, SODIUM LACTATE AND CALCIUM CHLORIDE 1000 ML: 600; 310; 30; 20 INJECTION, SOLUTION INTRAVENOUS at 12:32

## 2019-07-20 RX ADMIN — GABAPENTIN 100 MG: 100 CAPSULE ORAL at 21:14

## 2019-07-20 RX ADMIN — SODIUM CHLORIDE, POTASSIUM CHLORIDE, SODIUM LACTATE AND CALCIUM CHLORIDE: 600; 310; 30; 20 INJECTION, SOLUTION INTRAVENOUS at 16:29

## 2019-07-20 ASSESSMENT — PAIN DESCRIPTION - FREQUENCY: FREQUENCY: CONTINUOUS

## 2019-07-20 ASSESSMENT — ENCOUNTER SYMPTOMS
BLOOD IN STOOL: 0
EYE REDNESS: 0
ABDOMINAL PAIN: 0
WHEEZING: 0
DIARRHEA: 0
APNEA: 0
EYE PAIN: 0
SINUS PAIN: 0
NAUSEA: 0
COUGH: 0
SHORTNESS OF BREATH: 0
VOMITING: 0
RHINORRHEA: 0

## 2019-07-20 ASSESSMENT — PAIN DESCRIPTION - PAIN TYPE: TYPE: ACUTE PAIN

## 2019-07-20 ASSESSMENT — PAIN DESCRIPTION - LOCATION: LOCATION: HIP

## 2019-07-20 ASSESSMENT — PAIN DESCRIPTION - PROGRESSION: CLINICAL_PROGRESSION: GRADUALLY IMPROVING

## 2019-07-20 ASSESSMENT — PAIN SCALES - GENERAL
PAINLEVEL_OUTOF10: 7
PAINLEVEL_OUTOF10: 7
PAINLEVEL_OUTOF10: 9

## 2019-07-20 ASSESSMENT — PAIN - FUNCTIONAL ASSESSMENT: PAIN_FUNCTIONAL_ASSESSMENT: PREVENTS OR INTERFERES SOME ACTIVE ACTIVITIES AND ADLS

## 2019-07-20 ASSESSMENT — PAIN DESCRIPTION - ORIENTATION: ORIENTATION: RIGHT

## 2019-07-20 ASSESSMENT — PAIN DESCRIPTION - DESCRIPTORS: DESCRIPTORS: DISCOMFORT

## 2019-07-20 ASSESSMENT — PAIN DESCRIPTION - ONSET: ONSET: ON-GOING

## 2019-07-20 NOTE — ED NOTES
Bed: 26  Expected date:   Expected time:   Means of arrival:   Comments:  LS8     II Valdemar Lyn RN  07/20/19 1020

## 2019-07-20 NOTE — ED PROVIDER NOTES
Osmolality, Urine    Sodium, urine, random    DRUG SCREEN MULTI URINE    Hemoglobin A1C    DIET CARDIAC; Carb Control: 3 carb choices (45 gms)/meal    Telemetry monitoring    Vital signs per unit routine    Notify physician    Notify physician    Up with assistance    Orthostatic blood pressure and pulse    Vital signs per unit routine    Tobacco cessation education    Up with assistance    Daily weights    Intake and output    Neurovascular checks    Telemetry monitoring    Orthostatic blood pressure    Notify physician    Full Code    Inpatient consult to Internal Medicine    Inpatient consult to Case Management    OT eval and treat    PT evaluation and treat    Initiate Oxygen Therapy Protocol    POCT glucose    EKG 12 Lead    PATIENT STATUS (FROM ED OR OR/PROCEDURAL) Inpatient       MEDICATIONS ORDERED:  ED Medication Orders (From admission, onward)    Start Ordered     Status Ordering Provider    07/21/19 0900 07/20/19 1559  therapeutic multivitamin-minerals 1 tablet  DAILY      Acknowledged GINO GEORGE    07/21/19 0900 07/20/19 1559  vitamin B-1 (THIAMINE) tablet 100 mg  DAILY      Acknowledged GINO GEORGE    07/21/19 0900 07/21/19 0457  famotidine (PEPCID) injection 20 mg  DAILY      Acknowledged YANA DANIELS    07/21/19 0224 07/21/19 0224  calcium carbonate (TUMS) chewable tablet 500 mg  3 TIMES DAILY PRN      Last MAR action:  Given - by Shahida Rodriguez on 07/21/19 at 0637 GINO GEORGE    07/20/19 2345 07/20/19 2324  cefTRIAXone (ROCEPHIN) 1 g IVPB in 50 mL D5W minibag  EVERY 24 HOURS      Last MAR action:  Stopped - by Shahida Rodriguez on 07/21/19 at [facility], [address] [place] [address]    07/20/19 2100 07/20/19 1559  sodium chloride flush 0.9 % injection 10 mL  2 times per day      Last MAR action:  Not Given - by Shahida Rodriguez on 07/20/19 at 2030 [facility]    07/20/19 2100 07/20/19 1559  ipratropium-albuterol (DUONEB) nebulizer solution 3 mL  2 TIMES DAILY      Last MAR action:  Given Range    Color, UA DARK YELLOW (A) YELLOW    Turbidity UA CLOUDY (A) CLEAR    Glucose, Ur NEGATIVE NEGATIVE    Bilirubin Urine NEGATIVE NEGATIVE    Ketones, Urine SMALL (A) NEGATIVE    Specific Gravity, UA 1.022 1.005 - 1.030    Urine Hgb NEGATIVE NEGATIVE    pH, UA 5.5 5.0 - 8.0    Protein, UA 1+ (A) NEGATIVE    Urobilinogen, Urine Normal Normal    Nitrite, Urine NEGATIVE NEGATIVE    Leukocyte Esterase, Urine MODERATE (A) NEGATIVE    Urinalysis Comments Culture ordered based on defined criteria. URINALYSIS, MICRO   Result Value Ref Range    -          WBC, UA 10 TO 20 0 - 5 /HPF    RBC, UA 2 TO 5 0 - 2 /HPF    Casts UA HYALINE 0 - 2 /LPF    Casts UA 10 TO 20 0 - 2 /LPF    Crystals UA NOT REPORTED None /HPF    Epithelial Cells UA 5 TO 10 0 - 5 /HPF    Renal Epithelial, Urine NOT REPORTED 0 /HPF    Bacteria, UA NOT REPORTED None    Mucus, UA NOT REPORTED None    Trichomonas, UA NOT REPORTED None    Amorphous, UA NOT REPORTED None    Other Observations UA NOT REPORTED NOT REQ. Yeast, UA NOT REPORTED None   Osmolality, Urine   Result Value Ref Range    Osmolality, Ur 525 80 - 1300 mOsm/kg   Sodium, urine, random   Result Value Ref Range    Sodium,Ur 24 mmol/L   DRUG SCREEN MULTI URINE   Result Value Ref Range    Amphetamine Screen, Ur NEGATIVE NEGATIVE    Barbiturate Screen, Ur NEGATIVE NEGATIVE    Benzodiazepine Screen, Urine NEGATIVE NEGATIVE    Cocaine Metabolite, Urine POSITIVE (A) NEGATIVE    Methadone Screen, Urine NEGATIVE NEGATIVE    Opiates, Urine POSITIVE (A) NEGATIVE    Phencyclidine, Urine NEGATIVE NEGATIVE    Propoxyphene, Urine NOT REPORTED NEGATIVE    Cannabinoid Scrn, Ur NEGATIVE NEGATIVE    Oxycodone Screen, Ur NEGATIVE NEGATIVE    Methamphetamine, Urine NOT REPORTED NEGATIVE    Tricyclic Antidepressants, Urine NOT REPORTED NEGATIVE    MDMA, Urine NOT REPORTED NEGATIVE    Buprenorphine Urine NOT REPORTED NEGATIVE    Test Information       Assay provides medical screening only.   The absence of

## 2019-07-20 NOTE — ED PROVIDER NOTES
bilaterally.     Impression: Syncope, unclear etiology    Plan: labs, CT head, CXR, urine testing, fluids    EKG Interpretation  EKG Interpretation    Interpreted by emergency department physician    Rhythm: normal sinus   Rate: normal  Axis: normal  Ectopy: premature atrial contraction  Conduction: normal  ST Segments: no acute change  T Waves: no acute change  Q Waves: none    EKG  Impression: non-specific EKG    Ena Greer MD      Interpreted by estelita Ramirez MD  Attending Emergency Physician        Ena Greer MD  07/20/19 [id number] [id number]

## 2019-07-21 LAB
ABSOLUTE EOS #: 0.19 K/UL (ref 0–0.44)
ABSOLUTE IMMATURE GRANULOCYTE: 0.06 K/UL (ref 0–0.3)
ABSOLUTE LYMPH #: 2.33 K/UL (ref 1.1–3.7)
ABSOLUTE MONO #: 0.75 K/UL (ref 0.1–1.2)
AMPHETAMINE SCREEN URINE: NEGATIVE
ANION GAP SERPL CALCULATED.3IONS-SCNC: 14 MMOL/L (ref 9–17)
ANION GAP SERPL CALCULATED.3IONS-SCNC: 15 MMOL/L (ref 9–17)
BARBITURATE SCREEN URINE: NEGATIVE
BASOPHILS # BLD: 0 % (ref 0–2)
BASOPHILS ABSOLUTE: <0.03 K/UL (ref 0–0.2)
BENZODIAZEPINE SCREEN, URINE: NEGATIVE
BUN BLDV-MCNC: 6 MG/DL (ref 8–23)
BUN BLDV-MCNC: 8 MG/DL (ref 8–23)
BUN/CREAT BLD: ABNORMAL (ref 9–20)
BUN/CREAT BLD: ABNORMAL (ref 9–20)
BUPRENORPHINE URINE: ABNORMAL
CALCIUM SERPL-MCNC: 8.5 MG/DL (ref 8.6–10.4)
CALCIUM SERPL-MCNC: 8.8 MG/DL (ref 8.6–10.4)
CANNABINOID SCREEN URINE: NEGATIVE
CHLORIDE BLD-SCNC: 104 MMOL/L (ref 98–107)
CHLORIDE BLD-SCNC: 107 MMOL/L (ref 98–107)
CO2: 19 MMOL/L (ref 20–31)
CO2: 24 MMOL/L (ref 20–31)
COCAINE METABOLITE, URINE: POSITIVE
CREAT SERPL-MCNC: 0.71 MG/DL (ref 0.5–0.9)
CREAT SERPL-MCNC: 0.85 MG/DL (ref 0.5–0.9)
CULTURE: NORMAL
DIFFERENTIAL TYPE: ABNORMAL
EOSINOPHILS RELATIVE PERCENT: 4 % (ref 1–4)
ESTIMATED AVERAGE GLUCOSE: 74 MG/DL
GFR AFRICAN AMERICAN: >60 ML/MIN
GFR AFRICAN AMERICAN: >60 ML/MIN
GFR NON-AFRICAN AMERICAN: >60 ML/MIN
GFR NON-AFRICAN AMERICAN: >60 ML/MIN
GFR SERPL CREATININE-BSD FRML MDRD: ABNORMAL ML/MIN/{1.73_M2}
GLUCOSE BLD-MCNC: 110 MG/DL (ref 70–99)
GLUCOSE BLD-MCNC: 114 MG/DL (ref 65–105)
GLUCOSE BLD-MCNC: 123 MG/DL (ref 70–99)
HBA1C MFR BLD: 4.2 % (ref 4–6)
HCT VFR BLD CALC: 29 % (ref 36.3–47.1)
HCT VFR BLD CALC: 33 % (ref 36.3–47.1)
HEMOGLOBIN: 8.6 G/DL (ref 11.9–15.1)
HEMOGLOBIN: 9.7 G/DL (ref 11.9–15.1)
IMMATURE GRANULOCYTES: 1 %
LYMPHOCYTES # BLD: 46 % (ref 24–43)
Lab: NORMAL
MCH RBC QN AUTO: 28.1 PG (ref 25.2–33.5)
MCHC RBC AUTO-ENTMCNC: 29.7 G/DL (ref 28.4–34.8)
MCV RBC AUTO: 94.8 FL (ref 82.6–102.9)
MDMA URINE: ABNORMAL
METHADONE SCREEN, URINE: NEGATIVE
METHAMPHETAMINE, URINE: ABNORMAL
MONOCYTES # BLD: 15 % (ref 3–12)
NRBC AUTOMATED: 0 PER 100 WBC
OPIATES, URINE: POSITIVE
OXYCODONE SCREEN URINE: NEGATIVE
PDW BLD-RTO: 18.1 % (ref 11.8–14.4)
PHENCYCLIDINE, URINE: NEGATIVE
PLATELET # BLD: 348 K/UL (ref 138–453)
PLATELET ESTIMATE: ABNORMAL
PMV BLD AUTO: 10.4 FL (ref 8.1–13.5)
POTASSIUM SERPL-SCNC: 2.8 MMOL/L (ref 3.7–5.3)
POTASSIUM SERPL-SCNC: 3.8 MMOL/L (ref 3.7–5.3)
PROPOXYPHENE, URINE: ABNORMAL
RBC # BLD: 3.06 M/UL (ref 3.95–5.11)
RBC # BLD: ABNORMAL 10*6/UL
SEG NEUTROPHILS: 33 % (ref 36–65)
SEGMENTED NEUTROPHILS ABSOLUTE COUNT: 1.68 K/UL (ref 1.5–8.1)
SODIUM BLD-SCNC: 140 MMOL/L (ref 135–144)
SODIUM BLD-SCNC: 143 MMOL/L (ref 135–144)
SPECIMEN DESCRIPTION: NORMAL
TEST INFORMATION: ABNORMAL
TRICYCLIC ANTIDEPRESSANTS, UR: ABNORMAL
WBC # BLD: 5 K/UL (ref 3.5–11.3)
WBC # BLD: ABNORMAL 10*3/UL

## 2019-07-21 PROCEDURE — 80048 BASIC METABOLIC PNL TOTAL CA: CPT

## 2019-07-21 PROCEDURE — 82947 ASSAY GLUCOSE BLOOD QUANT: CPT

## 2019-07-21 PROCEDURE — 99232 SBSQ HOSP IP/OBS MODERATE 35: CPT | Performed by: INTERNAL MEDICINE

## 2019-07-21 PROCEDURE — 6370000000 HC RX 637 (ALT 250 FOR IP): Performed by: STUDENT IN AN ORGANIZED HEALTH CARE EDUCATION/TRAINING PROGRAM

## 2019-07-21 PROCEDURE — 85014 HEMATOCRIT: CPT

## 2019-07-21 PROCEDURE — 2700000000 HC OXYGEN THERAPY PER DAY

## 2019-07-21 PROCEDURE — 2500000003 HC RX 250 WO HCPCS: Performed by: STUDENT IN AN ORGANIZED HEALTH CARE EDUCATION/TRAINING PROGRAM

## 2019-07-21 PROCEDURE — 1200000000 HC SEMI PRIVATE

## 2019-07-21 PROCEDURE — 2580000003 HC RX 258: Performed by: STUDENT IN AN ORGANIZED HEALTH CARE EDUCATION/TRAINING PROGRAM

## 2019-07-21 PROCEDURE — 6360000002 HC RX W HCPCS: Performed by: STUDENT IN AN ORGANIZED HEALTH CARE EDUCATION/TRAINING PROGRAM

## 2019-07-21 PROCEDURE — 51798 US URINE CAPACITY MEASURE: CPT

## 2019-07-21 PROCEDURE — 94640 AIRWAY INHALATION TREATMENT: CPT

## 2019-07-21 PROCEDURE — 85025 COMPLETE CBC W/AUTO DIFF WBC: CPT

## 2019-07-21 PROCEDURE — 36415 COLL VENOUS BLD VENIPUNCTURE: CPT

## 2019-07-21 PROCEDURE — 85018 HEMOGLOBIN: CPT

## 2019-07-21 RX ORDER — LABETALOL HYDROCHLORIDE 5 MG/ML
10 INJECTION, SOLUTION INTRAVENOUS
Status: DISCONTINUED | OUTPATIENT
Start: 2019-07-21 | End: 2019-07-22 | Stop reason: HOSPADM

## 2019-07-21 RX ORDER — POTASSIUM CHLORIDE 20 MEQ/1
40 TABLET, EXTENDED RELEASE ORAL ONCE
Status: COMPLETED | OUTPATIENT
Start: 2019-07-21 | End: 2019-07-21

## 2019-07-21 RX ORDER — POTASSIUM CHLORIDE 7.45 MG/ML
10 INJECTION INTRAVENOUS
Status: COMPLETED | OUTPATIENT
Start: 2019-07-21 | End: 2019-07-21

## 2019-07-21 RX ORDER — HALOPERIDOL 5 MG/ML
5 INJECTION INTRAMUSCULAR ONCE
Status: COMPLETED | OUTPATIENT
Start: 2019-07-21 | End: 2019-07-21

## 2019-07-21 RX ORDER — POTASSIUM CHLORIDE 7.45 MG/ML
10 INJECTION INTRAVENOUS ONCE
Status: DISCONTINUED | OUTPATIENT
Start: 2019-07-21 | End: 2019-07-21

## 2019-07-21 RX ORDER — CALCIUM CARBONATE 200(500)MG
500 TABLET,CHEWABLE ORAL 3 TIMES DAILY PRN
Status: DISCONTINUED | OUTPATIENT
Start: 2019-07-21 | End: 2019-07-22 | Stop reason: HOSPADM

## 2019-07-21 RX ADMIN — BUPRENORPHINE AND NALOXONE 2 TABLET: 2; .5 TABLET SUBLINGUAL at 15:37

## 2019-07-21 RX ADMIN — HEPARIN SODIUM 5000 UNITS: 5000 INJECTION INTRAVENOUS; SUBCUTANEOUS at 20:13

## 2019-07-21 RX ADMIN — DOCUSATE SODIUM 100 MG: 100 CAPSULE, LIQUID FILLED ORAL at 20:13

## 2019-07-21 RX ADMIN — GABAPENTIN 100 MG: 100 CAPSULE ORAL at 08:44

## 2019-07-21 RX ADMIN — ANTACID TABLETS 500 MG: 500 TABLET, CHEWABLE ORAL at 10:02

## 2019-07-21 RX ADMIN — FOLIC ACID 1 MG: 1 TABLET ORAL at 08:44

## 2019-07-21 RX ADMIN — POTASSIUM CHLORIDE 40 MEQ: 20 TABLET, EXTENDED RELEASE ORAL at 12:19

## 2019-07-21 RX ADMIN — BUPRENORPHINE AND NALOXONE 1 TABLET: 8; 2 TABLET SUBLINGUAL at 15:37

## 2019-07-21 RX ADMIN — POTASSIUM CHLORIDE 10 MEQ: 7.46 INJECTION, SOLUTION INTRAVENOUS at 11:16

## 2019-07-21 RX ADMIN — HEPARIN SODIUM 5000 UNITS: 5000 INJECTION INTRAVENOUS; SUBCUTANEOUS at 15:37

## 2019-07-21 RX ADMIN — MULTIPLE VITAMINS W/ MINERALS TAB 1 TABLET: TAB at 08:44

## 2019-07-21 RX ADMIN — Medication 10 MG: at 18:19

## 2019-07-21 RX ADMIN — GABAPENTIN 100 MG: 100 CAPSULE ORAL at 20:13

## 2019-07-21 RX ADMIN — CEFTRIAXONE SODIUM 1 G: 1 INJECTION, POWDER, FOR SOLUTION INTRAMUSCULAR; INTRAVENOUS at 22:40

## 2019-07-21 RX ADMIN — Medication 100 MG: at 09:00

## 2019-07-21 RX ADMIN — FAMOTIDINE 20 MG: 10 INJECTION, SOLUTION INTRAVENOUS at 08:44

## 2019-07-21 RX ADMIN — IPRATROPIUM BROMIDE AND ALBUTEROL SULFATE 3 ML: .5; 3 SOLUTION RESPIRATORY (INHALATION) at 20:39

## 2019-07-21 RX ADMIN — POTASSIUM CHLORIDE 10 MEQ: 7.46 INJECTION, SOLUTION INTRAVENOUS at 08:49

## 2019-07-21 RX ADMIN — POTASSIUM CHLORIDE 10 MEQ: 7.46 INJECTION, SOLUTION INTRAVENOUS at 12:17

## 2019-07-21 RX ADMIN — DOCUSATE SODIUM 100 MG: 100 CAPSULE, LIQUID FILLED ORAL at 08:44

## 2019-07-21 RX ADMIN — HEPARIN SODIUM 5000 UNITS: 5000 INJECTION INTRAVENOUS; SUBCUTANEOUS at 06:37

## 2019-07-21 RX ADMIN — CEFTRIAXONE SODIUM 1 G: 1 INJECTION, POWDER, FOR SOLUTION INTRAMUSCULAR; INTRAVENOUS at 00:51

## 2019-07-21 RX ADMIN — HALOPERIDOL LACTATE 5 MG: 5 INJECTION INTRAMUSCULAR at 20:13

## 2019-07-21 RX ADMIN — ANTACID TABLETS 500 MG: 500 TABLET, CHEWABLE ORAL at 06:37

## 2019-07-21 RX ADMIN — POTASSIUM CHLORIDE 10 MEQ: 7.46 INJECTION, SOLUTION INTRAVENOUS at 10:02

## 2019-07-21 RX ADMIN — IPRATROPIUM BROMIDE AND ALBUTEROL SULFATE 3 ML: .5; 3 SOLUTION RESPIRATORY (INHALATION) at 07:59

## 2019-07-21 RX ADMIN — GABAPENTIN 100 MG: 100 CAPSULE ORAL at 15:37

## 2019-07-21 ASSESSMENT — PAIN SCALES - GENERAL
PAINLEVEL_OUTOF10: 7
PAINLEVEL_OUTOF10: 6
PAINLEVEL_OUTOF10: 7

## 2019-07-21 ASSESSMENT — PAIN DESCRIPTION - FREQUENCY
FREQUENCY: CONTINUOUS
FREQUENCY: CONTINUOUS

## 2019-07-21 ASSESSMENT — PAIN DESCRIPTION - PROGRESSION

## 2019-07-21 ASSESSMENT — PAIN DESCRIPTION - LOCATION
LOCATION: HIP
LOCATION: HIP

## 2019-07-21 ASSESSMENT — PAIN DESCRIPTION - ONSET
ONSET: ON-GOING
ONSET: ON-GOING

## 2019-07-21 ASSESSMENT — PAIN DESCRIPTION - DESCRIPTORS
DESCRIPTORS: DISCOMFORT
DESCRIPTORS: DISCOMFORT;CONSTANT

## 2019-07-21 ASSESSMENT — PAIN DESCRIPTION - PAIN TYPE
TYPE: ACUTE PAIN
TYPE: ACUTE PAIN

## 2019-07-21 ASSESSMENT — PAIN - FUNCTIONAL ASSESSMENT
PAIN_FUNCTIONAL_ASSESSMENT: PREVENTS OR INTERFERES SOME ACTIVE ACTIVITIES AND ADLS
PAIN_FUNCTIONAL_ASSESSMENT: PREVENTS OR INTERFERES SOME ACTIVE ACTIVITIES AND ADLS

## 2019-07-21 ASSESSMENT — PAIN DESCRIPTION - ORIENTATION
ORIENTATION: RIGHT
ORIENTATION: RIGHT

## 2019-07-21 NOTE — FLOWSHEET NOTE
Assessment: Patient is a 75 y.o. female who was admitted to the hospital due to \"syncope and collapse. \" Patient was sitting up in hospital bed, watching TV, when  visited. Patient expressed that she has been \"in and out of sleep. \" Patient seemed somewhat confused when  visited initially, however, upon 's return to room with reading glasses for patient, she was oriented. Per nurse, patient does \"move quickly from one subject to another. \"     Intervention:  visited patient per initial rounding visits and 30-day Readmit visits.  introduced herself to patient and provided comfort and support to patient at bedside.  provided hospitality and encouragement during visit. Outcomes: Patient thanked  for visit. Recommendation: Chaplains can make follow-up visit per request. Mark Neely can be reached 24/7 via [employer]. Keny Fisher     07/20/19 1892   Encounter Summary   Services provided to: Patient   Referral/Consult From: [facility] Family members   Place of [facility] Non-[place]anda[place]   Continue Visiting   (7/20/2019)   Complexity of Encounter Low   Length of Encounter 15 minutes   Spiritual Assessment Completed Yes   Routine   Type Initial   Spiritual/Episcopalian   Type Spiritual support   Assessment Approachable;Coping;Helplessness   Intervention Active listening;Explored feelings, thoughts, concerns;Explored coping resources;Nurtured hope;Sustaining presence/ Ministry of presence; Discussed illness/injury and it's impact   Outcome Expressed gratitude;Receptive

## 2019-07-22 VITALS
OXYGEN SATURATION: 96 % | SYSTOLIC BLOOD PRESSURE: 141 MMHG | WEIGHT: 95.4 LBS | HEART RATE: 105 BPM | BODY MASS INDEX: 18.73 KG/M2 | DIASTOLIC BLOOD PRESSURE: 85 MMHG | TEMPERATURE: 98.1 F | RESPIRATION RATE: 16 BRPM | HEIGHT: 60 IN

## 2019-07-22 LAB
ABSOLUTE EOS #: 0.17 K/UL (ref 0–0.44)
ABSOLUTE IMMATURE GRANULOCYTE: 0.1 K/UL (ref 0–0.3)
ABSOLUTE LYMPH #: 1.71 K/UL (ref 1.1–3.7)
ABSOLUTE MONO #: 0.79 K/UL (ref 0.1–1.2)
ANION GAP SERPL CALCULATED.3IONS-SCNC: 14 MMOL/L (ref 9–17)
BASOPHILS # BLD: 0 % (ref 0–2)
BASOPHILS ABSOLUTE: <0.03 K/UL (ref 0–0.2)
BUN BLDV-MCNC: 4 MG/DL (ref 8–23)
BUN/CREAT BLD: ABNORMAL (ref 9–20)
CALCIUM SERPL-MCNC: 9.5 MG/DL (ref 8.6–10.4)
CHLORIDE BLD-SCNC: 105 MMOL/L (ref 98–107)
CO2: 23 MMOL/L (ref 20–31)
CREAT SERPL-MCNC: 0.57 MG/DL (ref 0.5–0.9)
DIFFERENTIAL TYPE: ABNORMAL
EKG ATRIAL RATE: 97 BPM
EKG P AXIS: 58 DEGREES
EKG P-R INTERVAL: 154 MS
EKG Q-T INTERVAL: 380 MS
EKG QRS DURATION: 72 MS
EKG QTC CALCULATION (BAZETT): 482 MS
EKG R AXIS: 70 DEGREES
EKG T AXIS: 68 DEGREES
EKG VENTRICULAR RATE: 97 BPM
EOSINOPHILS RELATIVE PERCENT: 4 % (ref 1–4)
GFR AFRICAN AMERICAN: >60 ML/MIN
GFR NON-AFRICAN AMERICAN: >60 ML/MIN
GFR SERPL CREATININE-BSD FRML MDRD: ABNORMAL ML/MIN/{1.73_M2}
GFR SERPL CREATININE-BSD FRML MDRD: ABNORMAL ML/MIN/{1.73_M2}
GLUCOSE BLD-MCNC: 94 MG/DL (ref 70–99)
HCT VFR BLD CALC: 35.3 % (ref 36.3–47.1)
HEMOGLOBIN: 11.1 G/DL (ref 11.9–15.1)
IMMATURE GRANULOCYTES: 2 %
LYMPHOCYTES # BLD: 37 % (ref 24–43)
MCH RBC QN AUTO: 29.1 PG (ref 25.2–33.5)
MCHC RBC AUTO-ENTMCNC: 31.4 G/DL (ref 28.4–34.8)
MCV RBC AUTO: 92.7 FL (ref 82.6–102.9)
MONOCYTES # BLD: 17 % (ref 3–12)
NRBC AUTOMATED: 0 PER 100 WBC
PDW BLD-RTO: 17.8 % (ref 11.8–14.4)
PLATELET # BLD: 284 K/UL (ref 138–453)
PLATELET ESTIMATE: ABNORMAL
PMV BLD AUTO: 11.1 FL (ref 8.1–13.5)
POTASSIUM SERPL-SCNC: 3.5 MMOL/L (ref 3.7–5.3)
RBC # BLD: 3.81 M/UL (ref 3.95–5.11)
RBC # BLD: ABNORMAL 10*6/UL
SEG NEUTROPHILS: 40 % (ref 36–65)
SEGMENTED NEUTROPHILS ABSOLUTE COUNT: 1.89 K/UL (ref 1.5–8.1)
SODIUM BLD-SCNC: 142 MMOL/L (ref 135–144)
WBC # BLD: 4.7 K/UL (ref 3.5–11.3)
WBC # BLD: ABNORMAL 10*3/UL

## 2019-07-22 PROCEDURE — 2580000003 HC RX 258: Performed by: STUDENT IN AN ORGANIZED HEALTH CARE EDUCATION/TRAINING PROGRAM

## 2019-07-22 PROCEDURE — 6370000000 HC RX 637 (ALT 250 FOR IP): Performed by: STUDENT IN AN ORGANIZED HEALTH CARE EDUCATION/TRAINING PROGRAM

## 2019-07-22 PROCEDURE — 6360000002 HC RX W HCPCS: Performed by: STUDENT IN AN ORGANIZED HEALTH CARE EDUCATION/TRAINING PROGRAM

## 2019-07-22 PROCEDURE — 94640 AIRWAY INHALATION TREATMENT: CPT

## 2019-07-22 PROCEDURE — 36415 COLL VENOUS BLD VENIPUNCTURE: CPT

## 2019-07-22 PROCEDURE — 94761 N-INVAS EAR/PLS OXIMETRY MLT: CPT

## 2019-07-22 PROCEDURE — 99238 HOSP IP/OBS DSCHRG MGMT 30/<: CPT | Performed by: INTERNAL MEDICINE

## 2019-07-22 PROCEDURE — 2500000003 HC RX 250 WO HCPCS: Performed by: STUDENT IN AN ORGANIZED HEALTH CARE EDUCATION/TRAINING PROGRAM

## 2019-07-22 PROCEDURE — 80048 BASIC METABOLIC PNL TOTAL CA: CPT

## 2019-07-22 PROCEDURE — 93010 ELECTROCARDIOGRAM REPORT: CPT | Performed by: INTERNAL MEDICINE

## 2019-07-22 PROCEDURE — 93880 EXTRACRANIAL BILAT STUDY: CPT

## 2019-07-22 PROCEDURE — 85025 COMPLETE CBC W/AUTO DIFF WBC: CPT

## 2019-07-22 RX ORDER — POTASSIUM CHLORIDE 20 MEQ/1
40 TABLET, EXTENDED RELEASE ORAL ONCE
Status: COMPLETED | OUTPATIENT
Start: 2019-07-22 | End: 2019-07-22

## 2019-07-22 RX ORDER — CARVEDILOL 6.25 MG/1
6.25 TABLET ORAL 2 TIMES DAILY WITH MEALS
Status: DISCONTINUED | OUTPATIENT
Start: 2019-07-22 | End: 2019-07-22 | Stop reason: HOSPADM

## 2019-07-22 RX ADMIN — FAMOTIDINE 20 MG: 10 INJECTION, SOLUTION INTRAVENOUS at 09:35

## 2019-07-22 RX ADMIN — CARVEDILOL 6.25 MG: 6.25 TABLET, FILM COATED ORAL at 16:32

## 2019-07-22 RX ADMIN — BUPRENORPHINE AND NALOXONE 2 TABLET: 2; .5 TABLET SUBLINGUAL at 16:32

## 2019-07-22 RX ADMIN — DOCUSATE SODIUM 100 MG: 100 CAPSULE, LIQUID FILLED ORAL at 09:34

## 2019-07-22 RX ADMIN — IPRATROPIUM BROMIDE AND ALBUTEROL SULFATE 3 ML: .5; 3 SOLUTION RESPIRATORY (INHALATION) at 09:25

## 2019-07-22 RX ADMIN — Medication 10 ML: at 09:35

## 2019-07-22 RX ADMIN — GABAPENTIN 100 MG: 100 CAPSULE ORAL at 09:34

## 2019-07-22 RX ADMIN — FOLIC ACID 1 MG: 1 TABLET ORAL at 09:34

## 2019-07-22 RX ADMIN — GABAPENTIN 100 MG: 100 CAPSULE ORAL at 14:24

## 2019-07-22 RX ADMIN — MULTIPLE VITAMINS W/ MINERALS TAB 1 TABLET: TAB at 14:24

## 2019-07-22 RX ADMIN — POTASSIUM CHLORIDE 40 MEQ: 20 TABLET, EXTENDED RELEASE ORAL at 10:39

## 2019-07-22 RX ADMIN — CARVEDILOL 6.25 MG: 6.25 TABLET, FILM COATED ORAL at 09:34

## 2019-07-22 RX ADMIN — Medication 100 MG: at 09:34

## 2019-07-22 ASSESSMENT — PAIN SCALES - GENERAL: PAINLEVEL_OUTOF10: 7

## 2019-07-22 NOTE — PLAN OF CARE
Problem: Falls - Risk of:  Goal: Will remain free from falls  Description  Will remain free from falls  7/21/2019 2044 by Nj Quintero RN  Outcome: Ongoing     Problem: Falls - Risk of:  Goal: Absence of physical injury  Description  Absence of physical injury  7/21/2019 2044 by Nj Quintero RN  Outcome: Ongoing     Problem: Pain:  Goal: Pain level will decrease  Description  Pain level will decrease  7/21/2019 2044 by Nj Quintero RN  Outcome: Ongoing     Problem: Pain:  Goal: Control of acute pain  Description  Control of acute pain  7/21/2019 2044 by Nj Quintero RN  Outcome: Ongoing     Problem: Pain:  Goal: Control of chronic pain  Description  Control of chronic pain  Outcome: Ongoing     Problem: Nutrition  Goal: Optimal nutrition therapy  7/21/2019 2044 by Nj Quintero RN  Outcome: Ongoing

## 2019-07-22 NOTE — CARE COORDINATION
Discharge [facility] Case Management Department  Written by: Bassem Augustin RN    Patient Name: Huong Kiran  Attending Provider: No att. providers found  Admit Date: 2019 10:27 AM  MRN: [id number]  Account: [de-identified]                     : 1955  Discharge Date: 2019      Disposition: home indrpendently    Bassem Augustin RN

## 2019-07-22 NOTE — PROGRESS NOTES
Patient with increased confusion in an attempt to leave unit. Security notified and at the bedside awaiting Kallie Huerta to come to bedside to assess patient. Patient disoriented to place,time,situation, and alert to self only. Shadi Wilder aide at bedside for safety as we await an assigned sitter per order.
Weight    Nutrition Interventions:   Continue current diet, Start ONS  Continued Inpatient Monitoring, Education Not Indicated    Nutrition Evaluation:   · Evaluation: Goals set   · Goals: Meet at least 50% of estimated nutrient needs with PO    · Monitoring: Meal Intake, Supplement Intake, Diet Tolerance, Pertinent Labs, Weight      Electronically signed by Delberta Gottron, MS, RD, LD on 7/21/19 at 1:57 PM    Contact Number: [phone]
demonstrate no acute abnormality. SOFT TISSUES/SKULL:  No acute abnormality of the visualized skull or soft tissues. No acute intracranial abnormality. Xr Chest Portable    Result Date: 7/20/2019  EXAMINATION: ONE XRAY VIEW OF THE CHEST 7/20/2019 11:21 am COMPARISON: 06/20/2019 HISTORY: ORDERING SYSTEM PROVIDED HISTORY: Syncope FINDINGS: The lungs are without acute focal process. No effusion or pneumothorax. Normal heart size. Faint calcification aortic knob. Stable thoracic scoliosis. No acute process. Xr Hip 2-3 Vw W Pelvis Right    Result Date: 7/14/2019  History: R hip pain   Findings: Views: AP pelvis/frog-leg lateral right hip Findings: Demonstrates severe femoral head collapse from avascular necrosis. This appears worse from previous images on 20 June 2019. Also notable encapsulated area of calcification in the peritrochanteric region with central lucency, likely enchondroma. No other notable fractures. Significant osteopenia noted throughout the pelvis and femur Previous comparison films: 20 Jun 2019   Impression: 1. 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 Enchondroma        Assessment and Plan:     Principal Problem:    Acute kidney injury ([address]  Active Problems:    Hepatitis C    History of substance abuse    COPD (chronic obstructive pulmonary disease) (HCC)    Alcoholic cirrhosis of liver without ascites (HCC)    PUD (peptic ulcer disease)    Opiate withdrawal (HCC)    Pancreas head lesion    Polysubstance abuse ([address]    Syncope and collapse  Resolved Problems:    * No resolved hospital problems. *      Assessment/ Plan:  1. Syncope and collapse. Possibility due to CRYSTAL with creatinine of 1.35. Dehydration and poor oral entry are the probable causes of CRYSTAL. Gently resuscitate the patient with lactated Ringer's bolus will check renal function tomorrow  2. Hypotension. Fluid boluses given to improve hypotension. Continue to monitor vitals.   3. Orthostasis ruled out
today.  2. Hypertension. Resolved. Continue to monitor vitals. And if needed fluid boluses can be given. 3. Echo was done in January with normal LV function. No need to repeat it. Additionally syncope can be ruled out. 4. Avascular necrosis of the hip. Patient is currently taking Suboxone for pain. Ortho was consulted and they recommended to follow-up with her outpatient for surgery. 5. UTI. Rocephin 1 g daily started. DVT ppx : Heparin  GI ppx: PPI    PT/OT: Ongoing  Discharge Planning / SW: Ongoing    Spencer Lockett MD  PGY- 1   Internal Medicine Resident  [facility]  Attending Physician Statement  I have discussed the care of Emi Daniels, including pertinent history and exam findings,  with the resident. I have seen and examined the patient and the key elements of all parts of the encounter have been performed by me. I agree with the assessment, plan and orders as documented by the resident with additions . CC syncope  Stable Can be d/c. Treatment plan Discussed with nursing staff in detail , all questions answered . Electronically signed by Whit Garcia MD on   7/22/19 at 11:05 PM    Please note that this chart was generated using voice recognition Dragon dictation software. Although every effort was made to ensure the accuracy of this automated transcription, some errors in transcription may have occurred.

## 2019-07-26 NOTE — DISCHARGE SUMMARY
daily. [facility]      Multiple Vitamins-Minerals (THERAPEUTIC MULTIVITAMIN-MINERALS) tablet Take 1 tablet by mouth daily, Disp-30 tablet, R-3Normal      acetaminophen (TYLENOL) 325 MG tablet Take 1 tablet by mouth every 6 hours as needed for Pain, Disp-10 tablet, R-0Print      benzonatate (TESSALON PERLES) 100 MG capsule Take 1 capsule by mouth 3 times daily as needed for Cough, Disp-10 capsule, R-0Print      ferrous sulfate (MELISSA-HAO) 325 (65 Fe) MG tablet Take 1 tablet by mouth daily, Disp-30 tablet, R-0Normal      melatonin 1 MG tablet Take 1 tablet by mouth nightly as needed for Sleep, Disp-20 tablet, R-0Normal      ipratropium-albuterol (DUONEB) 0.5-2.5 (3) MG/3ML SOLN nebulizer solution Inhale 3 mLs into the lungs every 4 hours as needed for Shortness of Breath, Disp-360 mL, R-5Normal      benzocaine-menthol (CEPACOL SORE THROAT) 15-3.6 MG lozenge Take 1 lozenge by mouth every 2 hours as needed for Sore Throat, Disp-168 lozenge, R-0Normal      mometasone-formoterol (DULERA) 100-5 MCG/ACT inhaler Inhale 2 puffs into the lungs 2 times daily, Disp-1 Inhaler, R-5Normal      albuterol sulfate HFA (PROVENTIL HFA) 108 (90 Base) MCG/ACT inhaler Inhale 1-2 puffs into the lungs every 4 hours as needed for Wheezing or Shortness of Breath (Space out to every 6 hours as symptoms improve), Disp-1 Inhaler, R-5Normal      pantoprazole (PROTONIX) 40 MG tablet Take 1 tablet by mouth 2 times daily (before meals), Disp-30 tablet, Q-3WEDLVN      folic acid (FOLVITE) 1 MG tablet Take 1 tablet by mouth daily, Disp-30 tablet, R-3Normal      guaiFENesin (MUCINEX) 600 MG extended release tablet Take 1 tablet by mouth 2 times daily, Disp-20 tablet, R-0Normal             Activity: activity as tolerated    Diet: cardiac diet and renal diet    Follow-up:    Gavino Saleh MD  [address] [facility] [address]    Schedule an appointment as soon as possible for a visit in 3 days  New patient.  Hospital follow for

## 2019-08-05 ENCOUNTER — HOSPITAL ENCOUNTER (EMERGENCY)
Age: 64
Discharge: HOME | End: 2019-08-05
Attending: EMERGENCY MEDICINE
Payer: MEDICARE

## 2019-08-05 VITALS
HEIGHT: 60 IN | WEIGHT: 95 LBS | SYSTOLIC BLOOD PRESSURE: 156 MMHG | RESPIRATION RATE: 16 BRPM | HEART RATE: 94 BPM | OXYGEN SATURATION: 96 % | BODY MASS INDEX: 18.65 KG/M2 | TEMPERATURE: 99.5 F | DIASTOLIC BLOOD PRESSURE: 92 MMHG

## 2019-08-05 DIAGNOSIS — M87.00 AVN (AVASCULAR NECROSIS OF BONE) (HCC): Primary | ICD-10-CM

## 2019-08-05 DIAGNOSIS — M25.551 RIGHT HIP PAIN: ICD-10-CM

## 2019-08-05 DIAGNOSIS — R21 RASH: ICD-10-CM

## 2019-08-05 PROCEDURE — 99282 EMERGENCY DEPT VISIT SF MDM: CPT

## 2019-08-05 PROCEDURE — 6370000000 HC RX 637 (ALT 250 FOR IP): Performed by: PHYSICIAN ASSISTANT

## 2019-08-05 RX ORDER — HYDROXYZINE HYDROCHLORIDE 25 MG/1
25 TABLET, FILM COATED ORAL ONCE
Status: COMPLETED | OUTPATIENT
Start: 2019-08-05 | End: 2019-08-05

## 2019-08-05 RX ORDER — DIAPER,BRIEF,INFANT-TODD,DISP
EACH MISCELLANEOUS
Qty: 1 TUBE | Refills: 0 | Status: ON HOLD | OUTPATIENT
Start: 2019-08-05 | End: 2019-08-16 | Stop reason: HOSPADM

## 2019-08-05 RX ORDER — ACETAMINOPHEN AND CODEINE PHOSPHATE 300; 30 MG/1; MG/1
1 TABLET ORAL ONCE
Status: DISCONTINUED | OUTPATIENT
Start: 2019-08-05 | End: 2019-08-05 | Stop reason: HOSPADM

## 2019-08-05 RX ORDER — ACETAMINOPHEN AND CODEINE PHOSPHATE 300; 30 MG/1; MG/1
1 TABLET ORAL EVERY 6 HOURS PRN
Qty: 12 TABLET | Refills: 0 | Status: SHIPPED | OUTPATIENT
Start: 2019-08-05 | End: 2019-08-08

## 2019-08-05 RX ORDER — HYDROXYZINE HYDROCHLORIDE 25 MG/1
25 TABLET, FILM COATED ORAL EVERY 8 HOURS PRN
Qty: 12 TABLET | Refills: 0 | Status: SHIPPED | OUTPATIENT
Start: 2019-08-05 | End: 2021-03-28 | Stop reason: ALTCHOICE

## 2019-08-05 RX ORDER — WHEELCHAIR
1 EACH MISCELLANEOUS ONCE
Qty: 1 EACH | Refills: 0 | Status: SHIPPED | OUTPATIENT
Start: 2019-08-05 | End: 2021-06-06

## 2019-08-05 RX ADMIN — HYDROXYZINE HYDROCHLORIDE 25 MG: 25 TABLET ORAL at 12:38

## 2019-08-05 ASSESSMENT — ENCOUNTER SYMPTOMS
SORE THROAT: 0
VOMITING: 0
RHINORRHEA: 0
NAUSEA: 0
BACK PAIN: 0
COLOR CHANGE: 0
COUGH: 0
EYE DISCHARGE: 0
WHEEZING: 0
EYE ITCHING: 0
EYE PAIN: 0

## 2019-08-05 ASSESSMENT — PAIN DESCRIPTION - DESCRIPTORS: DESCRIPTORS: CONSTANT;SHOOTING

## 2019-08-05 ASSESSMENT — PAIN DESCRIPTION - PAIN TYPE: TYPE: ACUTE PAIN

## 2019-08-05 ASSESSMENT — PAIN DESCRIPTION - FREQUENCY: FREQUENCY: CONTINUOUS

## 2019-08-05 ASSESSMENT — PAIN SCALES - GENERAL: PAINLEVEL_OUTOF10: 8

## 2019-08-05 ASSESSMENT — PAIN DESCRIPTION - LOCATION: LOCATION: HIP

## 2019-08-05 ASSESSMENT — PAIN DESCRIPTION - ORIENTATION: ORIENTATION: LEFT

## 2019-08-05 NOTE — ED PROVIDER NOTES
[facility]     Emergency Department     Faculty Attestation    I performed a history and physical examination of the patient and discussed management with the resident. I reviewed the residents note and agree with the documented findings and plan of care. Any areas of disagreement are noted on the chart. I was personally present for the key portions of any procedures. I have documented in the chart those procedures where I was not present during the key portions. I have reviewed the emergency nurses triage note. I agree with the chief complaint, past medical history, past surgical history, allergies, medications, social and family history as documented unless otherwise noted below. Documentation of the HPI, Physical Exam and Medical Decision Making performed by medical students or scribes is based on my personal performance of the HPI, PE and MDM. For Physician Assistant/ Nurse Practitioner cases/documentation I have personally evaluated this patient and have completed at least one if not all key elements of the E/M (history, physical exam, and MDM). Additional findings are as noted. Vital signs:   Vitals:    08/05/19 1125   BP: (!) 156/92   Pulse: 94   Resp: 16   Temp: 99.5 °F (37.5 °C)   SpO2: 80      76-year-old female presents complaining of generalized itching and left hip pain. She has a known fracture of her left hip, and is currently under the care of orthopedic surgery. She states the fracture was just discovered about 2 months ago. She is ambulatory with a walker. On physical exam, she does have crusted over lesions that look like insect bites. We will treat her itching. Referral back to orthopedic surgery for her hip pain.             Nilesh Acharya M.D,  Attending Emergency  Physician            Comfort Tan MD  08/05/19 6037

## 2019-08-05 NOTE — ED PROVIDER NOTES
Tylenol 3 as this is the pain medication that the patient is requesting and have her follow-up with orthopedics for any further pain management. Is also requesting a wheelchair. I did have a face-to-face conversation with the patient about the wheelchair usage. She states that her apartment is set up as handicapped accessible and there is an elevator to get up to her apartment on the third floor. RADIOLOGY:   I directly visualized (with the attending physician) the following  imagesand reviewed the radiologist interpretations:  Ct Head Wo Contrast    Result Date: 7/20/2019  EXAMINATION: CT OF THE HEAD WITHOUT CONTRAST  7/20/2019 12:19 pm TECHNIQUE: CT of the head was performed without the administration of intravenous contrast. Dose modulation, iterative reconstruction, and/or weight based adjustment of the mA/kV was utilized to reduce the radiation dose to as low as reasonably achievable. COMPARISON: Brain MRI from April 22, 2019 HISTORY: ORDERING SYSTEM PROVIDED HISTORY: Syncope, fall from chair, eval head trauma TECHNOLOGIST PROVIDED HISTORY: FINDINGS: BRAIN/VENTRICLES: There is no acute intracranial hemorrhage, mass effect or midline shift. No abnormal extra-axial fluid collection. The gray-white differentiation is maintained without evidence of an acute infarct. There is no evidence of hydrocephalus. Mild age-appropriate atrophy and small-vessel disease ischemic changes. ORBITS: The visualized portion of the orbits demonstrate no acute abnormality. SINUSES: The visualized paranasal sinuses and mastoid air cells demonstrate no acute abnormality. SOFT TISSUES/SKULL:  No acute abnormality of the visualized skull or soft tissues. No acute intracranial abnormality. Xr Chest Portable    Result Date: 7/20/2019  EXAMINATION: ONE XRAY VIEW OF THE CHEST 7/20/2019 11:21 am COMPARISON: 06/20/2019 HISTORY: ORDERING SYSTEM PROVIDED HISTORY: Syncope FINDINGS: The lungs are without acute focal process.   No +------------+-------+-------+--------+-------+------------+---------------+ ! Dist ICA    !78.4   !27.4   !0       !0.65   !            !               ! +------------+-------+-------+--------+-------+------------+---------------+ ! Prox ECA    !74.2   !12.7   !60      !0.83   !            !               ! +------------+-------+-------+--------+-------+------------+---------------+ ! Vertebral   !96.9   !27.3   ! 60      !0.72   !            !               ! +------------+-------+-------+--------+-------+------------+---------------+   - There is antegrade vertebral flow noted on the left side. - Additional Measurements:ICAPSV/CCAPSV 1.16. ICAEDV/CCAEDV 2.32. Xr Hip 2-3 Vw W Pelvis Right    Result Date: 7/14/2019  History: R hip pain   Findings: Views: AP pelvis/frog-leg lateral right hip Findings: Demonstrates severe femoral head collapse from avascular necrosis. This appears worse from previous images on 20 June 2019. Also notable encapsulated area of calcification in the peritrochanteric region with central lucency, likely enchondroma. No other notable fractures. Significant osteopenia noted throughout the pelvis and femur Previous comparison films: 20 Jun 2019   Impression: 1. 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 Enchondroma      No orders to display       LABS:  No results found for this visit on 08/05/19. CONSULTS:  None    PROCEDURES:  None    FINAL IMPRESSION      1. AVN (avascular necrosis of bone) ([facility] [address] [facility]    2. Right hip pain    3. Rash          DISPOSITION / PLAN     DISPOSITION Decision To Discharge    PATIENT REFERRED TO:  Rona Aguayo, DO  [address] [facility] [address]  [facility], [place] [address] [facility]  [phone]    Schedule an appointment as soon as possible for a visit         DISCHARGE MEDICATIONS:  New Prescriptions    ACETAMINOPHEN-CODEINE (TYLENOL/CODEINE #3) 300-30 MG PER TABLET    Take 1 tablet by mouth every 6 hours as needed for Pain for up to 3 days.  Intended supply: 3 days. Take lowest dose possible to manage pain    HYDROCORTISONE 1 % CREAM    Apply topically 2 -3 times daily for 5 - 7 days. HYDROXYZINE (ATARAX) 25 MG TABLET    Take 1 tablet by mouth every 8 hours as needed for Itching    MISC.  DEVICES [facility]) MISC    1 Device by Does not apply route once for 1 dose       Sarah Hall PA-C   Emergency Medicine Physician Assistant    (Please note that portions of this note were completed with a voice recognition program.  Efforts were made to edit thedictations but occasionally words are mis-transcribed.)        Sarah Hall PA-C  08/05/19 0019

## 2019-08-05 NOTE — ED NOTES
Pt reports to the ED via triage with c/o Pruritis x4 days and Lt hip pain rated 8/10. Pt states that she has pruritus all over her body that has not gone away, Pt denies any new meds or any new creams. Pts Lt hip was fractured x2 months ago and is having pain in that hip. Pt is ambulotory with walker, A&Ox4, call light in reach.      Gala Duarte RN  08/05/19 [id number]

## 2019-08-10 ENCOUNTER — APPOINTMENT (OUTPATIENT)
Dept: GENERAL RADIOLOGY | Age: 64
DRG: 301 | End: 2019-08-10
Payer: MEDICARE

## 2019-08-10 ENCOUNTER — HOSPITAL ENCOUNTER (INPATIENT)
Age: 64
LOS: 12 days | Discharge: NURSING, REHAB OR LONG-TERM CARE | DRG: 301 | End: 2019-08-22
Attending: EMERGENCY MEDICINE | Admitting: INTERNAL MEDICINE
Payer: MEDICARE

## 2019-08-10 DIAGNOSIS — M87.00 AVASCULAR NECROSIS (HCC): Primary | ICD-10-CM

## 2019-08-10 DIAGNOSIS — Z96.641 HISTORY OF TOTAL RIGHT HIP REPLACEMENT: ICD-10-CM

## 2019-08-10 DIAGNOSIS — S72.051D CLOSED FRACTURE OF HEAD OF RIGHT FEMUR WITH ROUTINE HEALING, SUBSEQUENT ENCOUNTER: ICD-10-CM

## 2019-08-10 PROBLEM — M25.559 HIP PAIN: Status: ACTIVE | Noted: 2019-08-10

## 2019-08-10 LAB
-: ABNORMAL
ABSOLUTE EOS #: 0.05 K/UL (ref 0–0.44)
ABSOLUTE IMMATURE GRANULOCYTE: 0.04 K/UL (ref 0–0.3)
ABSOLUTE LYMPH #: 1.21 K/UL (ref 1.1–3.7)
ABSOLUTE MONO #: 0.38 K/UL (ref 0.1–1.2)
ALBUMIN SERPL-MCNC: 4.1 G/DL (ref 3.5–5.2)
ALBUMIN/GLOBULIN RATIO: 0.8 (ref 1–2.5)
ALP BLD-CCNC: 78 U/L (ref 35–104)
ALT SERPL-CCNC: 9 U/L (ref 5–33)
AMORPHOUS: ABNORMAL
ANION GAP SERPL CALCULATED.3IONS-SCNC: 21 MMOL/L (ref 9–17)
AST SERPL-CCNC: 32 U/L
BACTERIA: ABNORMAL
BASOPHILS # BLD: 0 % (ref 0–2)
BASOPHILS ABSOLUTE: <0.03 K/UL (ref 0–0.2)
BILIRUB SERPL-MCNC: 0.64 MG/DL (ref 0.3–1.2)
BILIRUBIN URINE: NEGATIVE
BUN BLDV-MCNC: 8 MG/DL (ref 8–23)
BUN/CREAT BLD: ABNORMAL (ref 9–20)
CALCIUM SERPL-MCNC: 10.1 MG/DL (ref 8.6–10.4)
CASTS UA: ABNORMAL /LPF (ref 0–2)
CHLORIDE BLD-SCNC: 96 MMOL/L (ref 98–107)
CO2: 18 MMOL/L (ref 20–31)
COLOR: ABNORMAL
COMMENT UA: ABNORMAL
CREAT SERPL-MCNC: 0.59 MG/DL (ref 0.5–0.9)
CRYSTALS, UA: ABNORMAL /HPF
DIFFERENTIAL TYPE: ABNORMAL
EOSINOPHILS RELATIVE PERCENT: 1 % (ref 1–4)
EPITHELIAL CELLS UA: ABNORMAL /HPF (ref 0–5)
GFR AFRICAN AMERICAN: >60 ML/MIN
GFR NON-AFRICAN AMERICAN: >60 ML/MIN
GFR SERPL CREATININE-BSD FRML MDRD: ABNORMAL ML/MIN/{1.73_M2}
GFR SERPL CREATININE-BSD FRML MDRD: ABNORMAL ML/MIN/{1.73_M2}
GLUCOSE BLD-MCNC: 67 MG/DL (ref 70–99)
GLUCOSE URINE: NEGATIVE
HCT VFR BLD CALC: 41.8 % (ref 36.3–47.1)
HEMOGLOBIN: 11.9 G/DL (ref 11.9–15.1)
IMMATURE GRANULOCYTES: 1 %
INR BLD: 1
KETONES, URINE: ABNORMAL
LEUKOCYTE ESTERASE, URINE: ABNORMAL
LYMPHOCYTES # BLD: 22 % (ref 24–43)
MCH RBC QN AUTO: 26.4 PG (ref 25.2–33.5)
MCHC RBC AUTO-ENTMCNC: 28.5 G/DL (ref 28.4–34.8)
MCV RBC AUTO: 92.7 FL (ref 82.6–102.9)
MONOCYTES # BLD: 7 % (ref 3–12)
MUCUS: ABNORMAL
NITRITE, URINE: NEGATIVE
NRBC AUTOMATED: 0 PER 100 WBC
OTHER OBSERVATIONS UA: ABNORMAL
PARTIAL THROMBOPLASTIN TIME: 19.4 SEC (ref 20.5–30.5)
PDW BLD-RTO: 19 % (ref 11.8–14.4)
PH UA: 6 (ref 5–8)
PLATELET # BLD: ABNORMAL K/UL (ref 138–453)
PLATELET ESTIMATE: ABNORMAL
PLATELET, FLUORESCENCE: NORMAL K/UL (ref 138–453)
PLATELET, IMMATURE FRACTION: NORMAL % (ref 1.1–10.3)
PMV BLD AUTO: ABNORMAL FL (ref 8.1–13.5)
POTASSIUM SERPL-SCNC: 4.2 MMOL/L (ref 3.7–5.3)
PROTEIN UA: ABNORMAL
PROTHROMBIN TIME: 10.2 SEC (ref 9–12)
RBC # BLD: 4.51 M/UL (ref 3.95–5.11)
RBC # BLD: ABNORMAL 10*6/UL
RBC UA: ABNORMAL /HPF (ref 0–2)
RENAL EPITHELIAL, UA: ABNORMAL /HPF
SEG NEUTROPHILS: 69 % (ref 36–65)
SEGMENTED NEUTROPHILS ABSOLUTE COUNT: 3.8 K/UL (ref 1.5–8.1)
SODIUM BLD-SCNC: 135 MMOL/L (ref 135–144)
SPECIFIC GRAVITY UA: 1.02 (ref 1–1.03)
TOTAL PROTEIN: 9.2 G/DL (ref 6.4–8.3)
TRICHOMONAS: ABNORMAL
TURBIDITY: CLEAR
URINE HGB: NEGATIVE
UROBILINOGEN, URINE: NORMAL
WBC # BLD: 5.5 K/UL (ref 3.5–11.3)
WBC # BLD: ABNORMAL 10*3/UL
WBC UA: ABNORMAL /HPF (ref 0–5)
YEAST: ABNORMAL

## 2019-08-10 PROCEDURE — 6370000000 HC RX 637 (ALT 250 FOR IP): Performed by: INTERNAL MEDICINE

## 2019-08-10 PROCEDURE — 85610 PROTHROMBIN TIME: CPT

## 2019-08-10 PROCEDURE — 93005 ELECTROCARDIOGRAM TRACING: CPT | Performed by: STUDENT IN AN ORGANIZED HEALTH CARE EDUCATION/TRAINING PROGRAM

## 2019-08-10 PROCEDURE — 99285 EMERGENCY DEPT VISIT HI MDM: CPT

## 2019-08-10 PROCEDURE — 1200000000 HC SEMI PRIVATE

## 2019-08-10 PROCEDURE — 85025 COMPLETE CBC W/AUTO DIFF WBC: CPT

## 2019-08-10 PROCEDURE — 85055 RETICULATED PLATELET ASSAY: CPT

## 2019-08-10 PROCEDURE — 71045 X-RAY EXAM CHEST 1 VIEW: CPT

## 2019-08-10 PROCEDURE — 85730 THROMBOPLASTIN TIME PARTIAL: CPT

## 2019-08-10 PROCEDURE — 6370000000 HC RX 637 (ALT 250 FOR IP): Performed by: NURSE PRACTITIONER

## 2019-08-10 PROCEDURE — 6360000002 HC RX W HCPCS: Performed by: INTERNAL MEDICINE

## 2019-08-10 PROCEDURE — 6360000002 HC RX W HCPCS: Performed by: STUDENT IN AN ORGANIZED HEALTH CARE EDUCATION/TRAINING PROGRAM

## 2019-08-10 PROCEDURE — 81001 URINALYSIS AUTO W/SCOPE: CPT

## 2019-08-10 PROCEDURE — 6360000002 HC RX W HCPCS: Performed by: NURSE PRACTITIONER

## 2019-08-10 PROCEDURE — 2580000003 HC RX 258: Performed by: INTERNAL MEDICINE

## 2019-08-10 PROCEDURE — 73502 X-RAY EXAM HIP UNI 2-3 VIEWS: CPT

## 2019-08-10 PROCEDURE — 80053 COMPREHEN METABOLIC PANEL: CPT

## 2019-08-10 PROCEDURE — 6370000000 HC RX 637 (ALT 250 FOR IP): Performed by: STUDENT IN AN ORGANIZED HEALTH CARE EDUCATION/TRAINING PROGRAM

## 2019-08-10 RX ORDER — SODIUM CHLORIDE 0.9 % (FLUSH) 0.9 %
10 SYRINGE (ML) INJECTION EVERY 12 HOURS SCHEDULED
Status: DISCONTINUED | OUTPATIENT
Start: 2019-08-10 | End: 2019-08-20

## 2019-08-10 RX ORDER — POTASSIUM CHLORIDE 7.45 MG/ML
10 INJECTION INTRAVENOUS PRN
Status: DISCONTINUED | OUTPATIENT
Start: 2019-08-10 | End: 2019-08-22 | Stop reason: HOSPADM

## 2019-08-10 RX ORDER — HYDROXYZINE HYDROCHLORIDE 25 MG/1
25 TABLET, FILM COATED ORAL EVERY 8 HOURS PRN
Status: DISCONTINUED | OUTPATIENT
Start: 2019-08-10 | End: 2019-08-22 | Stop reason: HOSPADM

## 2019-08-10 RX ORDER — HYDROCODONE BITARTRATE AND ACETAMINOPHEN 5; 325 MG/1; MG/1
1 TABLET ORAL ONCE
Status: COMPLETED | OUTPATIENT
Start: 2019-08-10 | End: 2019-08-10

## 2019-08-10 RX ORDER — ALBUTEROL SULFATE 90 UG/1
2 AEROSOL, METERED RESPIRATORY (INHALATION) EVERY 4 HOURS PRN
Status: DISCONTINUED | OUTPATIENT
Start: 2019-08-10 | End: 2019-08-22 | Stop reason: HOSPADM

## 2019-08-10 RX ORDER — KETOROLAC TROMETHAMINE 15 MG/ML
15 INJECTION, SOLUTION INTRAMUSCULAR; INTRAVENOUS ONCE
Status: COMPLETED | OUTPATIENT
Start: 2019-08-10 | End: 2019-08-10

## 2019-08-10 RX ORDER — MAGNESIUM SULFATE 1 G/100ML
1 INJECTION INTRAVENOUS PRN
Status: DISCONTINUED | OUTPATIENT
Start: 2019-08-10 | End: 2019-08-22 | Stop reason: HOSPADM

## 2019-08-10 RX ORDER — NICOTINE 21 MG/24HR
1 PATCH, TRANSDERMAL 24 HOURS TRANSDERMAL DAILY PRN
Status: DISCONTINUED | OUTPATIENT
Start: 2019-08-10 | End: 2019-08-22 | Stop reason: HOSPADM

## 2019-08-10 RX ORDER — CARVEDILOL 6.25 MG/1
6.25 TABLET ORAL 2 TIMES DAILY WITH MEALS
Status: DISCONTINUED | OUTPATIENT
Start: 2019-08-10 | End: 2019-08-22 | Stop reason: HOSPADM

## 2019-08-10 RX ORDER — LANOLIN ALCOHOL/MO/W.PET/CERES
325 CREAM (GRAM) TOPICAL DAILY
Status: DISCONTINUED | OUTPATIENT
Start: 2019-08-10 | End: 2019-08-22 | Stop reason: HOSPADM

## 2019-08-10 RX ORDER — GABAPENTIN 300 MG/1
300 CAPSULE ORAL 3 TIMES DAILY
Status: DISCONTINUED | OUTPATIENT
Start: 2019-08-10 | End: 2019-08-22 | Stop reason: HOSPADM

## 2019-08-10 RX ORDER — FOLIC ACID 1 MG/1
1 TABLET ORAL DAILY
Status: DISCONTINUED | OUTPATIENT
Start: 2019-08-10 | End: 2019-08-22 | Stop reason: HOSPADM

## 2019-08-10 RX ORDER — IPRATROPIUM BROMIDE AND ALBUTEROL SULFATE 2.5; .5 MG/3ML; MG/3ML
1 SOLUTION RESPIRATORY (INHALATION) EVERY 4 HOURS PRN
Status: DISCONTINUED | OUTPATIENT
Start: 2019-08-10 | End: 2019-08-22 | Stop reason: HOSPADM

## 2019-08-10 RX ORDER — ONDANSETRON 2 MG/ML
4 INJECTION INTRAMUSCULAR; INTRAVENOUS EVERY 6 HOURS PRN
Status: DISCONTINUED | OUTPATIENT
Start: 2019-08-10 | End: 2019-08-22 | Stop reason: HOSPADM

## 2019-08-10 RX ORDER — HYDROCODONE BITARTRATE AND ACETAMINOPHEN 5; 325 MG/1; MG/1
2 TABLET ORAL EVERY 4 HOURS PRN
Status: DISCONTINUED | OUTPATIENT
Start: 2019-08-10 | End: 2019-08-16

## 2019-08-10 RX ORDER — ACETAMINOPHEN 325 MG/1
650 TABLET ORAL EVERY 4 HOURS PRN
Status: DISCONTINUED | OUTPATIENT
Start: 2019-08-10 | End: 2019-08-16

## 2019-08-10 RX ORDER — THIAMINE MONONITRATE (VIT B1) 100 MG
100 TABLET ORAL DAILY
Status: DISCONTINUED | OUTPATIENT
Start: 2019-08-10 | End: 2019-08-22 | Stop reason: HOSPADM

## 2019-08-10 RX ORDER — MORPHINE SULFATE 4 MG/ML
4 INJECTION, SOLUTION INTRAMUSCULAR; INTRAVENOUS ONCE
Status: COMPLETED | OUTPATIENT
Start: 2019-08-10 | End: 2019-08-10

## 2019-08-10 RX ORDER — ACETAMINOPHEN 325 MG/1
650 TABLET ORAL EVERY 4 HOURS PRN
Status: DISCONTINUED | OUTPATIENT
Start: 2019-08-10 | End: 2019-08-10 | Stop reason: SDUPTHER

## 2019-08-10 RX ORDER — M-VIT,TX,IRON,MINS/CALC/FOLIC 27MG-0.4MG
1 TABLET ORAL DAILY
Status: DISCONTINUED | OUTPATIENT
Start: 2019-08-10 | End: 2019-08-22 | Stop reason: HOSPADM

## 2019-08-10 RX ORDER — SODIUM CHLORIDE 0.9 % (FLUSH) 0.9 %
10 SYRINGE (ML) INJECTION PRN
Status: DISCONTINUED | OUTPATIENT
Start: 2019-08-10 | End: 2019-08-22 | Stop reason: HOSPADM

## 2019-08-10 RX ORDER — GUAIFENESIN 600 MG/1
600 TABLET, EXTENDED RELEASE ORAL 2 TIMES DAILY
Status: DISCONTINUED | OUTPATIENT
Start: 2019-08-10 | End: 2019-08-22 | Stop reason: HOSPADM

## 2019-08-10 RX ORDER — BUPRENORPHINE AND NALOXONE 12; 3 MG/1; MG/1
1 FILM, SOLUBLE BUCCAL; SUBLINGUAL DAILY
Status: DISCONTINUED | OUTPATIENT
Start: 2019-08-10 | End: 2019-08-10

## 2019-08-10 RX ORDER — POTASSIUM CHLORIDE 20 MEQ/1
40 TABLET, EXTENDED RELEASE ORAL PRN
Status: DISCONTINUED | OUTPATIENT
Start: 2019-08-10 | End: 2019-08-22 | Stop reason: HOSPADM

## 2019-08-10 RX ORDER — CLINDAMYCIN PHOSPHATE 900 MG/50ML
900 INJECTION INTRAVENOUS
Status: DISPENSED | OUTPATIENT
Start: 2019-08-11 | End: 2019-08-11

## 2019-08-10 RX ORDER — CHOLECALCIFEROL (VITAMIN D3) 125 MCG
250 CAPSULE ORAL DAILY
Status: DISCONTINUED | OUTPATIENT
Start: 2019-08-10 | End: 2019-08-22 | Stop reason: HOSPADM

## 2019-08-10 RX ORDER — HYDROCODONE BITARTRATE AND ACETAMINOPHEN 5; 325 MG/1; MG/1
1 TABLET ORAL EVERY 4 HOURS PRN
Status: DISCONTINUED | OUTPATIENT
Start: 2019-08-10 | End: 2019-08-16

## 2019-08-10 RX ORDER — PANTOPRAZOLE SODIUM 40 MG/1
40 TABLET, DELAYED RELEASE ORAL
Status: DISCONTINUED | OUTPATIENT
Start: 2019-08-11 | End: 2019-08-22 | Stop reason: HOSPADM

## 2019-08-10 RX ORDER — SODIUM CHLORIDE 9 MG/ML
INJECTION, SOLUTION INTRAVENOUS CONTINUOUS
Status: DISCONTINUED | OUTPATIENT
Start: 2019-08-10 | End: 2019-08-18

## 2019-08-10 RX ORDER — ACETAMINOPHEN 325 MG/1
325 TABLET ORAL EVERY 6 HOURS PRN
Status: DISCONTINUED | OUTPATIENT
Start: 2019-08-10 | End: 2019-08-10 | Stop reason: SDUPTHER

## 2019-08-10 RX ORDER — BENZONATATE 100 MG/1
100 CAPSULE ORAL 3 TIMES DAILY PRN
Status: DISCONTINUED | OUTPATIENT
Start: 2019-08-10 | End: 2019-08-22 | Stop reason: HOSPADM

## 2019-08-10 RX ORDER — UREA 10 %
1 LOTION (ML) TOPICAL NIGHTLY PRN
Status: DISCONTINUED | OUTPATIENT
Start: 2019-08-10 | End: 2019-08-22 | Stop reason: HOSPADM

## 2019-08-10 RX ADMIN — HYDROCODONE BITARTRATE AND ACETAMINOPHEN 1 TABLET: 5; 325 TABLET ORAL at 10:57

## 2019-08-10 RX ADMIN — HYDROCODONE BITARTRATE AND ACETAMINOPHEN 2 TABLET: 5; 325 TABLET ORAL at 23:27

## 2019-08-10 RX ADMIN — CARVEDILOL 6.25 MG: 6.25 TABLET, FILM COATED ORAL at 19:12

## 2019-08-10 RX ADMIN — HYDROCODONE BITARTRATE AND ACETAMINOPHEN 2 TABLET: 5; 325 TABLET ORAL at 19:25

## 2019-08-10 RX ADMIN — GABAPENTIN 300 MG: 300 CAPSULE ORAL at 22:03

## 2019-08-10 RX ADMIN — ACETAMINOPHEN 650 MG: 325 TABLET ORAL at 19:22

## 2019-08-10 RX ADMIN — SODIUM CHLORIDE: 9 INJECTION, SOLUTION INTRAVENOUS at 18:26

## 2019-08-10 RX ADMIN — ENOXAPARIN SODIUM 40 MG: 40 INJECTION SUBCUTANEOUS at 19:10

## 2019-08-10 RX ADMIN — Medication 10 ML: at 22:03

## 2019-08-10 RX ADMIN — HYDROCODONE BITARTRATE AND ACETAMINOPHEN 1 TABLET: 5; 325 TABLET ORAL at 16:37

## 2019-08-10 RX ADMIN — MORPHINE SULFATE 4 MG: 4 INJECTION INTRAVENOUS at 22:03

## 2019-08-10 RX ADMIN — KETOROLAC TROMETHAMINE 15 MG: 15 INJECTION, SOLUTION INTRAMUSCULAR; INTRAVENOUS at 10:57

## 2019-08-10 ASSESSMENT — ENCOUNTER SYMPTOMS
ABDOMINAL PAIN: 0
BACK PAIN: 0
SHORTNESS OF BREATH: 0
PHOTOPHOBIA: 0
TROUBLE SWALLOWING: 0
VOMITING: 0
COUGH: 0
SORE THROAT: 0
CHEST TIGHTNESS: 0
NAUSEA: 0
WHEEZING: 0

## 2019-08-10 ASSESSMENT — PAIN DESCRIPTION - FREQUENCY
FREQUENCY: CONTINUOUS
FREQUENCY: CONTINUOUS

## 2019-08-10 ASSESSMENT — PAIN SCALES - GENERAL
PAINLEVEL_OUTOF10: 6
PAINLEVEL_OUTOF10: 10
PAINLEVEL_OUTOF10: 7
PAINLEVEL_OUTOF10: 10
PAINLEVEL_OUTOF10: 10
PAINLEVEL_OUTOF10: 8
PAINLEVEL_OUTOF10: 8
PAINLEVEL_OUTOF10: 10
PAINLEVEL_OUTOF10: 10
PAINLEVEL_OUTOF10: 6
PAINLEVEL_OUTOF10: 6

## 2019-08-10 ASSESSMENT — PAIN DESCRIPTION - ORIENTATION
ORIENTATION: RIGHT
ORIENTATION: RIGHT

## 2019-08-10 ASSESSMENT — PAIN DESCRIPTION - PAIN TYPE
TYPE: ACUTE PAIN
TYPE: ACUTE PAIN

## 2019-08-10 ASSESSMENT — PAIN DESCRIPTION - LOCATION
LOCATION: HIP
LOCATION: HIP

## 2019-08-10 ASSESSMENT — PAIN DESCRIPTION - ONSET: ONSET: ON-GOING

## 2019-08-10 ASSESSMENT — PAIN DESCRIPTION - DESCRIPTORS
DESCRIPTORS: ACHING;CONSTANT
DESCRIPTORS: ACHING

## 2019-08-10 ASSESSMENT — PAIN DESCRIPTION - PROGRESSION: CLINICAL_PROGRESSION: NOT CHANGED

## 2019-08-10 NOTE — CARE COORDINATION
Case Management Initial Discharge Plan  Emi Daniels,             Met with:patient to discuss discharge plans. Information verified: address, contacts, phone number, , insurance Yes  PCP: No primary care provider on file. Date of last visit: she [facility] to [facility] at [facility] [phone]. Can't remember who her doctor is-they keep changing. Insurance Provider: theodore adv    Discharge Planning    Living Arrangements:  Alone   Support Systems:  Family Members    Home has 1 stories  3 stairs to climb to get into front door, elevator to 3rd floor apt.stairs to climb to reach second floor  Location of bedroom/bathroom in home main    Patient able to perform ADL's:Independent    Current Services (outpatient & in home) [facility]  DME equipment: rollator  DME provider:     Pharmacy: [facility] with delivery   Potential Assistance Purchasing Medications:  No  Does patient want to participate in local refill/ meds to beds program?  Not Assessed    Potential Assistance Needed:  Home Care, Other (Comment)    Patient agreeable to home care: Yes  Freedom of choice provided:  yes    Prior SNF/Rehab Placement and Facility: no  Agreeable to SNF/Rehab: Yes  [place] of choice provided: yes   Evaluation: n/a    Expected Discharge date:  19  Patient expects to be discharged to:  home vs rehab  Follow Up Appointment: Best Day/ Time:      Transportation provider: brother or cab  Transportation arrangements needed for discharge: Yes    Readmission Risk              Risk of Unplanned Readmission:        45             Does patient have a readmission risk score greater than 14?: Yes  If yes, follow-up appointment must be made within 7 days of discharge. Discharge Plan: Pt lives alone in 3rd story apt. She states she has [facility]. Plan for OR right hip. She states she wants to go home with [facility].   She does not want to go to a SNF but would consider an inpatient rehab if needed. Her sister was at one, not sure which one. [facility] yet. Await PT/OT evals post op.           Electronically signed by Ray Nicolas RN on 8/10/19 at 6:11 PM

## 2019-08-10 NOTE — ED PROVIDER NOTES
Tere Gonzalez MD   vitamin B-12 250 MCG tablet Take 1 tablet by mouth daily 10/13/18   Tere Gonzalez MD   gabapentin (NEURONTIN) 300 MG capsule Take 300 mg by mouth 3 times daily. Tavia Grigsby Historical Provider, MD   Multiple Vitamins-Minerals (THERAPEUTIC MULTIVITAMIN-MINERALS) tablet Take 1 tablet by mouth daily 8/12/18   Brandy Case MD       REVIEW OF SYSTEMS    (2-9 systems for level 4, 10 or more for level 5)      Review of Systems   Constitutional: Negative for chills and fever. HENT: Negative for sore throat and trouble swallowing. Eyes: Negative for photophobia. Respiratory: Negative for cough, chest tightness, shortness of breath and wheezing. Cardiovascular: Negative for chest pain and palpitations. Gastrointestinal: Negative for abdominal pain, nausea and vomiting. Endocrine: Negative for polyuria. Genitourinary: Negative for dysuria and flank pain. Musculoskeletal: Negative for back pain and neck pain. Right hip pain with inability to walk. Skin: Negative for rash and wound. Neurological: Negative for syncope, weakness, light-headedness and headaches. Psychiatric/Behavioral: Negative for agitation and confusion. PHYSICAL EXAM   (up to 7 for level 4, 8 or more for level 5)      INITIAL VITALS:   BP (!) 146/80   Pulse 91   Temp 98.6 °F (37 °C) (Oral)   Resp 16   Ht 5' (1.524 m)   Wt 95 lb (43.1 kg)   LMP 03/18/2015   SpO2 96%   BMI 18.55 kg/m²     Physical Exam   Constitutional: She is oriented to person, place, and time. She appears well-developed and well-nourished. HENT:   Head: Normocephalic and atraumatic. Nose: Nose normal.   Mouth/Throat: Oropharynx is clear and moist.   Eyes: Pupils are equal, round, and reactive to light. EOM are normal.   Neck: Normal range of motion. Neck supple. Cardiovascular: Normal rate, regular rhythm, normal heart sounds and intact distal pulses. Pulmonary/Chest: Breath sounds normal. No respiratory distress.  She has CMP, coags ordered to expedite medical clearance for surgery. Significant hip degeneration/collapse of the hip joint on x-ray. Patient stable otherwise. Patient admitted to Intermed service for medical clearance and medical management. Orthopedic surgery agreeable to plan and will plan for surgery. PROCEDURES:  None    CONSULTS:  IP CONSULT TO ORTHOPEDIC SURGERY  IP CONSULT TO INTERNAL MEDICINE    CRITICAL CARE:  None    FINAL IMPRESSION      1. Avascular necrosis ([address]          DISPOSITION / PLAN     DISPOSITION Admitted 08/10/2019 02:10:12 PM      PATIENT REFERRED TO:  No follow-up provider specified.     DISCHARGE MEDICATIONS:  Current Discharge Medication List          Roland Solomon MD  Emergency Medicine Resident    (Please note that portions of thisnote were completed with a voice recognition program.  Efforts were made to edit the dictations but occasionally words are mis-transcribed.)       Roland oSlomon MD  08/10/19 0220

## 2019-08-10 NOTE — ED NOTES
Patient resting on stretcher and denies any concerns at this time. Will continue to monitor.          Kristy Dennis RN  08/10/19 9025

## 2019-08-11 PROBLEM — M87.051 AVASCULAR NECROSIS OF BONE OF RIGHT HIP (HCC): Status: ACTIVE | Noted: 2019-08-10

## 2019-08-11 PROBLEM — E87.6 HYPOKALEMIA: Status: ACTIVE | Noted: 2019-08-11

## 2019-08-11 PROBLEM — S72.051A CLOSED FRACTURE OF HEAD OF RIGHT FEMUR (HCC): Status: ACTIVE | Noted: 2019-08-11

## 2019-08-11 PROBLEM — M87.059 AVASCULAR NECROSIS OF HIP (HCC): Status: ACTIVE | Noted: 2019-08-10

## 2019-08-11 PROBLEM — J44.9 COPD WITHOUT EXACERBATION (HCC): Status: ACTIVE | Noted: 2019-08-11

## 2019-08-11 PROBLEM — Z86.19 HISTORY OF HEPATITIS C: Status: ACTIVE | Noted: 2019-08-11

## 2019-08-11 PROBLEM — A41.9 SEPSIS (HCC): Status: RESOLVED | Noted: 2019-02-04 | Resolved: 2019-08-11

## 2019-08-11 PROBLEM — N17.9 ACUTE KIDNEY INJURY (HCC): Status: RESOLVED | Noted: 2019-04-20 | Resolved: 2019-08-11

## 2019-08-11 PROBLEM — K92.2 ACUTE GI HEMORRHAGE: Status: RESOLVED | Noted: 2018-11-12 | Resolved: 2019-08-11

## 2019-08-11 LAB
ANION GAP SERPL CALCULATED.3IONS-SCNC: 14 MMOL/L (ref 9–17)
BUN BLDV-MCNC: 11 MG/DL (ref 8–23)
BUN/CREAT BLD: ABNORMAL (ref 9–20)
CALCIUM SERPL-MCNC: 8.4 MG/DL (ref 8.6–10.4)
CHLORIDE BLD-SCNC: 101 MMOL/L (ref 98–107)
CO2: 21 MMOL/L (ref 20–31)
CREAT SERPL-MCNC: 0.68 MG/DL (ref 0.5–0.9)
GFR AFRICAN AMERICAN: >60 ML/MIN
GFR NON-AFRICAN AMERICAN: >60 ML/MIN
GFR SERPL CREATININE-BSD FRML MDRD: ABNORMAL ML/MIN/{1.73_M2}
GFR SERPL CREATININE-BSD FRML MDRD: ABNORMAL ML/MIN/{1.73_M2}
GLUCOSE BLD-MCNC: 105 MG/DL (ref 70–99)
HCT VFR BLD CALC: 31.2 % (ref 36.3–47.1)
HEMOGLOBIN: 9 G/DL (ref 11.9–15.1)
INR BLD: 1
MAGNESIUM: 1.7 MG/DL (ref 1.6–2.6)
MCH RBC QN AUTO: 26.8 PG (ref 25.2–33.5)
MCHC RBC AUTO-ENTMCNC: 28.8 G/DL (ref 28.4–34.8)
MCV RBC AUTO: 92.9 FL (ref 82.6–102.9)
NRBC AUTOMATED: 0 PER 100 WBC
PDW BLD-RTO: 19.1 % (ref 11.8–14.4)
PLATELET # BLD: 528 K/UL (ref 138–453)
PMV BLD AUTO: 9.2 FL (ref 8.1–13.5)
POTASSIUM SERPL-SCNC: 3.2 MMOL/L (ref 3.7–5.3)
PROTHROMBIN TIME: 10.8 SEC (ref 9–12)
RBC # BLD: 3.36 M/UL (ref 3.95–5.11)
SODIUM BLD-SCNC: 136 MMOL/L (ref 135–144)
WBC # BLD: 5 K/UL (ref 3.5–11.3)

## 2019-08-11 PROCEDURE — 36415 COLL VENOUS BLD VENIPUNCTURE: CPT

## 2019-08-11 PROCEDURE — 99222 1ST HOSP IP/OBS MODERATE 55: CPT | Performed by: INTERNAL MEDICINE

## 2019-08-11 PROCEDURE — 83735 ASSAY OF MAGNESIUM: CPT

## 2019-08-11 PROCEDURE — 6370000000 HC RX 637 (ALT 250 FOR IP): Performed by: INTERNAL MEDICINE

## 2019-08-11 PROCEDURE — 85027 COMPLETE CBC AUTOMATED: CPT

## 2019-08-11 PROCEDURE — 94640 AIRWAY INHALATION TREATMENT: CPT

## 2019-08-11 PROCEDURE — 6370000000 HC RX 637 (ALT 250 FOR IP): Performed by: NURSE PRACTITIONER

## 2019-08-11 PROCEDURE — 1200000000 HC SEMI PRIVATE

## 2019-08-11 PROCEDURE — 80048 BASIC METABOLIC PNL TOTAL CA: CPT

## 2019-08-11 PROCEDURE — 85610 PROTHROMBIN TIME: CPT

## 2019-08-11 RX ADMIN — CARVEDILOL 6.25 MG: 6.25 TABLET, FILM COATED ORAL at 09:10

## 2019-08-11 RX ADMIN — MOMETASONE FUROATE AND FORMOTEROL FUMARATE DIHYDRATE 2 PUFF: 100; 5 AEROSOL RESPIRATORY (INHALATION) at 09:05

## 2019-08-11 RX ADMIN — HYDROCODONE BITARTRATE AND ACETAMINOPHEN 2 TABLET: 5; 325 TABLET ORAL at 17:04

## 2019-08-11 RX ADMIN — GUAIFENESIN 600 MG: 600 TABLET, EXTENDED RELEASE ORAL at 09:10

## 2019-08-11 RX ADMIN — CARVEDILOL 6.25 MG: 6.25 TABLET, FILM COATED ORAL at 17:05

## 2019-08-11 RX ADMIN — GABAPENTIN 300 MG: 300 CAPSULE ORAL at 21:34

## 2019-08-11 RX ADMIN — PANTOPRAZOLE SODIUM 40 MG: 40 TABLET, DELAYED RELEASE ORAL at 07:37

## 2019-08-11 RX ADMIN — HYDROCODONE BITARTRATE AND ACETAMINOPHEN 2 TABLET: 5; 325 TABLET ORAL at 12:15

## 2019-08-11 RX ADMIN — HYDROCODONE BITARTRATE AND ACETAMINOPHEN 2 TABLET: 5; 325 TABLET ORAL at 21:34

## 2019-08-11 RX ADMIN — GABAPENTIN 300 MG: 300 CAPSULE ORAL at 15:36

## 2019-08-11 RX ADMIN — Medication 250 MCG: at 09:10

## 2019-08-11 RX ADMIN — POTASSIUM CHLORIDE 40 MEQ: 20 TABLET, EXTENDED RELEASE ORAL at 07:32

## 2019-08-11 RX ADMIN — HYDROCODONE BITARTRATE AND ACETAMINOPHEN 2 TABLET: 5; 325 TABLET ORAL at 07:32

## 2019-08-11 RX ADMIN — HYDROCODONE BITARTRATE AND ACETAMINOPHEN 2 TABLET: 5; 325 TABLET ORAL at 03:37

## 2019-08-11 RX ADMIN — GABAPENTIN 300 MG: 300 CAPSULE ORAL at 09:11

## 2019-08-11 RX ADMIN — PANTOPRAZOLE SODIUM 40 MG: 40 TABLET, DELAYED RELEASE ORAL at 15:37

## 2019-08-11 ASSESSMENT — PAIN SCALES - GENERAL
PAINLEVEL_OUTOF10: 7
PAINLEVEL_OUTOF10: 5
PAINLEVEL_OUTOF10: 7
PAINLEVEL_OUTOF10: 8
PAINLEVEL_OUTOF10: 7
PAINLEVEL_OUTOF10: 7
PAINLEVEL_OUTOF10: 5

## 2019-08-11 ASSESSMENT — PAIN DESCRIPTION - ORIENTATION: ORIENTATION: RIGHT

## 2019-08-11 ASSESSMENT — PAIN DESCRIPTION - PAIN TYPE
TYPE: ACUTE PAIN

## 2019-08-11 ASSESSMENT — PAIN DESCRIPTION - LOCATION
LOCATION: HIP

## 2019-08-11 NOTE — PROGRESS NOTES
Progress Note    Patient:  Emi Daniels  YOB: 1955     75 y.o. female    Subjective:  Patient seen and examined at bedside this morning. No complaints or concerns per patient this morning. Complaining of continued R hip pain, at baseline. Improves with pain medications  No acute issues overnight per nursing. Pain well-controlled. Denies: fever/chills, HA, CP, SOB, N/V, dysuria, or numbness/tingling in extremities. PT: Top evaluate and treat post operatively      Objective:   Vitals:    08/10/19 2030   BP: (!) 112/94   Pulse: 96   Resp: 14   Temp: 99 °F (37.2 °C)   SpO2: 96%     Gen: NAD, cooperative  MSK: TTP around proximal hip. + log roll. Compartments soft. 2+ DP pulse. TA/EHL/FHL/GS motor intact. Deep and Superficial Peroneal/Saphenous/Sural/Plantar SILT.         Recent Labs     08/11/19  0446   WBC 5.0   HGB 9.0*   HCT 31.2*   *   INR 1.0      K 3.2*   BUN 11   CREATININE 0.68   GLUCOSE 105*       Meds: Lovenox  See rec for complete list    Assessment/Plan : 75 y.o. female with R hip AVN and impaired functional mobiltiy    -Plan for OR 8/12 pending surgical clearance per medicine team. Please document in chart.  -Consent signed and in chart  -Weight Bearing: WBAT RLE  -Clindamycin OCTOR  -Pain control: Per primary team  -Tolerating PO intake well  -Ice (20 min, 1 hour off) and elevation for edema/pain control  -Encourage IS and deep breathing  -DVT ppx: Recommend chemical AC, hold morning of 8/12 for OR.  EPC's  -PT/OT to evaluate and treat  -Please page ortho with any questions    Mitul Banda DO,  PGY-3 Orthopedic Surgery  8:29 AM 8/11/2019

## 2019-08-11 NOTE — H&P
100 MG capsule Take 1 capsule by mouth 3 times daily as needed for Cough 3/11/19   Libby Lopez DO   carvedilol (COREG) 6.25 MG tablet Take 1 tablet by mouth 2 times daily (with meals) 2/7/19   Omi Edmondson MD   ferrous sulfate (MELISSA-HAO) 325 (65 Fe) MG tablet Take 1 tablet by mouth daily 2/7/19   Omi Edmondson MD   melatonin 1 MG tablet Take 1 tablet by mouth nightly as needed for Sleep 1/11/19   Kyle Camara MD   ipratropium-albuterol (DUONEB) 0.5-2.5 (3) MG/3ML SOLN nebulizer solution Inhale 3 mLs into the lungs every 4 hours as needed for Shortness of Breath 1/11/19   Kyle Camara MD   mometasone-formoterol [facility]) 100-5 MCG/ACT inhaler Inhale 2 puffs into the lungs 2 times daily 12/5/18   Shagufta Rogers MD   albuterol sulfate HFA (PROVENTIL HFA) 108 (90 Base) MCG/ACT inhaler Inhale 1-2 puffs into the lungs every 4 hours as needed for Wheezing or Shortness of Breath (Space out to every 6 hours as symptoms improve) 12/5/18   Shagufta Rogers MD   pantoprazole (PROTONIX) 40 MG tablet Take 1 tablet by mouth 2 times daily (before meals) 11/13/18   Sarahy Garcia MD   folic acid (FOLVITE) 1 MG tablet Take 1 tablet by mouth daily 10/13/18   Jose Alejandro Wharton MD   vitamin B-1 100 MG tablet Take 1 tablet by mouth daily 10/13/18   Jose Alejandro Wharton MD   vitamin B-12 250 MCG tablet Take 1 tablet by mouth daily 10/13/18   Jose Alejandro Wharton MD   gabapentin (NEURONTIN) 300 MG capsule Take 300 mg by mouth 3 times daily. Malachi Chisholm Historical Provider, MD   Multiple Vitamins-Minerals (THERAPEUTIC MULTIVITAMIN-MINERALS) tablet Take 1 tablet by mouth daily 8/12/18   Mu Wu MD        Allergies:     Aspirin and Porter Papito [cephalexin]    Social History:     Tobacco:    reports that she quit smoking about 9 months ago. Her smoking use included cigarettes. She started smoking about 49 years ago. She has a 40.00 pack-year smoking history.  She has never used smokeless tobacco.  Alcohol:      reports that she does not drink extraocular eye movements intact, conjunctiva clear  Ear: normal external ear, no discharge, hearing intact  Nose:  no drainage noted  Mouth: mucous membranes moist  Neck: supple, no carotid bruits, thyroid not palpable  Lungs: Bilateral equal air entry, clear to ausculation, no wheezing, rales or rhonchi, normal effort  Cardiovascular: normal rate, regular rhythm, no murmur, gallop, rub.   Abdomen: Soft, nontender, nondistended, normal bowel sounds, no hepatomegaly or splenomegaly  Neurologic: There are no new focal motor or sensory deficits, normal muscle tone and bulk, no abnormal sensation, normal speech, cranial nerves II through XII grossly intact  Skin: No gross lesions, rashes, bruising or bleeding on exposed skin area  Extremities:  peripheral pulses palpable, no pedal edema or calf pain with palpation  Psych: normal affect    Investigations:      Laboratory Testing:  Recent Results (from the past 24 hour(s))   URINALYSIS    Collection Time: 08/10/19  4:42 PM   Result Value Ref Range    Color, UA MARCO ANTONIO (A) YELLOW    Turbidity UA CLEAR (A) CLEAR    Glucose, Ur NEGATIVE NEGATIVE    Bilirubin Urine NEGATIVE NEGATIVE    Ketones, Urine LARGE (A) NEGATIVE    Specific Gravity, UA 1.022 1.005 - 1.030    Urine Hgb NEGATIVE NEGATIVE    pH, UA 6.0 5.0 - 8.0    Protein, UA TRACE (A) NEGATIVE    Urobilinogen, Urine Normal Normal    Nitrite, Urine NEGATIVE NEGATIVE    Leukocyte Esterase, Urine TRACE (A) NEGATIVE    Urinalysis Comments NOT REPORTED    Microscopic Urinalysis    Collection Time: 08/10/19  4:42 PM   Result Value Ref Range    -          WBC, UA 2 TO 5 0 - 5 /HPF    RBC, UA 0 TO 2 0 - 2 /HPF    Casts UA 2 TO 5 0 - 2 /LPF    Crystals UA NOT REPORTED None /HPF    Epithelial Cells UA 10 TO 20 0 - 5 /HPF    Renal Epithelial, Urine NOT REPORTED 0 /HPF    Bacteria, UA NOT REPORTED None    Mucus, UA 1+ (A) None    Trichomonas, UA NOT REPORTED None    Amorphous, UA NOT REPORTED None    Other Observations UA NOT REPORTED NOT REQ. Yeast, UA NOT REPORTED None   Basic Metabolic Panel w/ Reflex to MG    Collection Time: 08/11/19  4:46 AM   Result Value Ref Range    Glucose 105 (H) 70 - 99 mg/dL    BUN 11 8 - 23 mg/dL    CREATININE 0.68 0.50 - 0.90 mg/dL    Bun/Cre Ratio NOT REPORTED 9 - 20    Calcium 8.4 (L) 8.6 - 10.4 mg/dL    Sodium 136 135 - 144 mmol/L    Potassium 3.2 (L) 3.7 - 5.3 mmol/L    Chloride 101 98 - 107 mmol/L    CO2 21 20 - 31 mmol/L    Anion Gap 14 9 - 17 mmol/L    GFR Non-African American >60 >60 mL/min    GFR African American >60 >60 mL/min    GFR Comment          GFR Staging NOT REPORTED    CBC    Collection Time: 08/11/19  4:46 AM   Result Value Ref Range    WBC 5.0 3.5 - 11.3 k/uL    RBC 3.36 (L) 3.95 - 5.11 m/uL    Hemoglobin 9.0 (L) 11.9 - 15.1 g/dL    Hematocrit 31.2 (L) 36.3 - 47.1 %    MCV 92.9 82.6 - 102.9 fL    MCH 26.8 25.2 - 33.5 pg    MCHC 28.8 28.4 - 34.8 g/dL    RDW 19.1 (H) 11.8 - 14.4 %    Platelets 040 (H) 992 - 453 k/uL    MPV 9.2 8.1 - 13.5 fL    NRBC Automated 0.0 0.0 per 100 WBC   Protime-INR    Collection Time: 08/11/19  4:46 AM   Result Value Ref Range    Protime 10.8 9.0 - 12.0 sec    INR 1.0    Magnesium    Collection Time: 08/11/19  4:46 AM   Result Value Ref Range    Magnesium 1.7 1.6 - 2.6 mg/dL       Imaging/Diagnostics:  Xr Chest Portable    Result Date: 8/10/2019  No acute cardiopulmonary process. Xr Hip 2-3 Vw W Pelvis Right    Result Date: 8/10/2019  Stable right hip appearance. There is evidence of severe avascular necrosis with collapse of the right femoral head. Arthritic changes present in the right hip, with appearance of the right hip relatively stable since prior study. Microfracture along the articulating surface of the remaining femoral head is noted which could account for the patient's pain.        Assessment :      Hospital Problems           Last Modified POA    * (Principal) Avascular necrosis of bone of right hip ([address] 8/11/2019 Yes    Essential

## 2019-08-12 ENCOUNTER — APPOINTMENT (OUTPATIENT)
Dept: CT IMAGING | Age: 64
DRG: 301 | End: 2019-08-12
Payer: MEDICARE

## 2019-08-12 LAB
EKG ATRIAL RATE: 96 BPM
EKG P AXIS: 79 DEGREES
EKG P-R INTERVAL: 160 MS
EKG Q-T INTERVAL: 340 MS
EKG QRS DURATION: 68 MS
EKG QTC CALCULATION (BAZETT): 429 MS
EKG R AXIS: 67 DEGREES
EKG T AXIS: 80 DEGREES
EKG VENTRICULAR RATE: 96 BPM

## 2019-08-12 PROCEDURE — 6370000000 HC RX 637 (ALT 250 FOR IP): Performed by: INTERNAL MEDICINE

## 2019-08-12 PROCEDURE — 99232 SBSQ HOSP IP/OBS MODERATE 35: CPT | Performed by: PHYSICIAN ASSISTANT

## 2019-08-12 PROCEDURE — 1200000000 HC SEMI PRIVATE

## 2019-08-12 PROCEDURE — 72192 CT PELVIS W/O DYE: CPT

## 2019-08-12 PROCEDURE — 93010 ELECTROCARDIOGRAM REPORT: CPT | Performed by: INTERNAL MEDICINE

## 2019-08-12 PROCEDURE — 2580000003 HC RX 258: Performed by: INTERNAL MEDICINE

## 2019-08-12 PROCEDURE — 94640 AIRWAY INHALATION TREATMENT: CPT

## 2019-08-12 PROCEDURE — 76376 3D RENDER W/INTRP POSTPROCES: CPT

## 2019-08-12 PROCEDURE — 76937 US GUIDE VASCULAR ACCESS: CPT

## 2019-08-12 PROCEDURE — 6370000000 HC RX 637 (ALT 250 FOR IP): Performed by: NURSE PRACTITIONER

## 2019-08-12 RX ORDER — CLINDAMYCIN PHOSPHATE 900 MG/50ML
900 INJECTION INTRAVENOUS
Status: COMPLETED | OUTPATIENT
Start: 2019-08-13 | End: 2019-08-13

## 2019-08-12 RX ADMIN — CARVEDILOL 6.25 MG: 6.25 TABLET, FILM COATED ORAL at 09:41

## 2019-08-12 RX ADMIN — Medication 250 MCG: at 09:42

## 2019-08-12 RX ADMIN — GABAPENTIN 300 MG: 300 CAPSULE ORAL at 09:41

## 2019-08-12 RX ADMIN — GABAPENTIN 300 MG: 300 CAPSULE ORAL at 14:57

## 2019-08-12 RX ADMIN — HYDROCODONE BITARTRATE AND ACETAMINOPHEN 2 TABLET: 5; 325 TABLET ORAL at 05:02

## 2019-08-12 RX ADMIN — HYDROCODONE BITARTRATE AND ACETAMINOPHEN 2 TABLET: 5; 325 TABLET ORAL at 22:57

## 2019-08-12 RX ADMIN — GABAPENTIN 300 MG: 300 CAPSULE ORAL at 21:54

## 2019-08-12 RX ADMIN — GUAIFENESIN 600 MG: 600 TABLET, EXTENDED RELEASE ORAL at 21:54

## 2019-08-12 RX ADMIN — HYDROCODONE BITARTRATE AND ACETAMINOPHEN 2 TABLET: 5; 325 TABLET ORAL at 14:02

## 2019-08-12 RX ADMIN — SODIUM CHLORIDE: 9 INJECTION, SOLUTION INTRAVENOUS at 23:54

## 2019-08-12 RX ADMIN — MOMETASONE FUROATE AND FORMOTEROL FUMARATE DIHYDRATE 2 PUFF: 100; 5 AEROSOL RESPIRATORY (INHALATION) at 09:13

## 2019-08-12 RX ADMIN — HYDROCODONE BITARTRATE AND ACETAMINOPHEN 2 TABLET: 5; 325 TABLET ORAL at 01:09

## 2019-08-12 RX ADMIN — CARVEDILOL 6.25 MG: 6.25 TABLET, FILM COATED ORAL at 18:09

## 2019-08-12 RX ADMIN — HYDROCODONE BITARTRATE AND ACETAMINOPHEN 2 TABLET: 5; 325 TABLET ORAL at 18:11

## 2019-08-12 RX ADMIN — HYDROCODONE BITARTRATE AND ACETAMINOPHEN 2 TABLET: 5; 325 TABLET ORAL at 09:52

## 2019-08-12 RX ADMIN — PANTOPRAZOLE SODIUM 40 MG: 40 TABLET, DELAYED RELEASE ORAL at 18:09

## 2019-08-12 ASSESSMENT — PAIN DESCRIPTION - ORIENTATION: ORIENTATION: RIGHT

## 2019-08-12 ASSESSMENT — PAIN SCALES - GENERAL
PAINLEVEL_OUTOF10: 5
PAINLEVEL_OUTOF10: 6
PAINLEVEL_OUTOF10: 7
PAINLEVEL_OUTOF10: 7
PAINLEVEL_OUTOF10: 5
PAINLEVEL_OUTOF10: 7
PAINLEVEL_OUTOF10: 5
PAINLEVEL_OUTOF10: 6
PAINLEVEL_OUTOF10: 7

## 2019-08-12 ASSESSMENT — PAIN DESCRIPTION - LOCATION: LOCATION: HIP

## 2019-08-12 ASSESSMENT — ENCOUNTER SYMPTOMS
VOMITING: 0
CONSTIPATION: 0
ABDOMINAL DISTENTION: 0
COUGH: 0
DIARRHEA: 0
CHEST TIGHTNESS: 0
SHORTNESS OF BREATH: 0
NAUSEA: 0
ABDOMINAL PAIN: 0

## 2019-08-12 ASSESSMENT — PAIN DESCRIPTION - DESCRIPTORS: DESCRIPTORS: ACHING

## 2019-08-12 ASSESSMENT — PAIN DESCRIPTION - ONSET: ONSET: ON-GOING

## 2019-08-12 ASSESSMENT — PAIN DESCRIPTION - FREQUENCY: FREQUENCY: CONTINUOUS

## 2019-08-12 ASSESSMENT — PAIN DESCRIPTION - PAIN TYPE: TYPE: ACUTE PAIN

## 2019-08-12 NOTE — PROGRESS NOTES
Smoking Cessation - topics covered   []  Health Risks  []  Benefits of Quitting   []  Smoking Cessation  []  Patient has no history of tobacco use  [x]  Patient is former smoker. Patient quit in 2018. [x]  No need for tobacco cessation education. []  Booklet given  []  Patient verbalizes understanding. []  Patient denies need for tobacco cessation education. []  Unable to meet with patient today. Will follow up as able.   Johanna Coppola  9:03 AM

## 2019-08-12 NOTE — PROGRESS NOTES
least 1 pack per day for at least 30 years, Hyperlipidemia, Hypertension, Migraine, Moderate COPD (chronic obstructive pulmonary disease) ([facility]), Multiple transfusions, Osteoarthritis, Pneumonia, Scoliosis, and Substance abuse ([address] Social History:   reports that she quit smoking about 9 months ago. Her smoking use included cigarettes. She started smoking about 49 years ago. She has a 40.00 pack-year smoking history. She has never used smokeless tobacco. She reports that she does not drink alcohol or use drugs. Family History:   Family History   Problem Relation Age of Onset    Breast Cancer Mother     Heart Disease Father        Vitals:  /79   Pulse 81   Temp 98.6 °F (37 °C) (Oral)   Resp 16   Ht 5' (1.524 m)   Wt 95 lb (43.1 kg)   LMP 2015   SpO2 98%   BMI 18.55 kg/m²   Temp (24hrs), Av.4 °F (36.9 °C), Min:98 °F (36.7 °C), Max:98.7 °F (37.1 °C)    No results for input(s): POCGLU in the last 72 hours. I/O (24Hr):     Intake/Output Summary (Last 24 hours) at 2019 1104  Last data filed at 2019 0907  Gross per 24 hour   Intake 2800 ml   Output --   Net 2800 ml       Labs:  Hematology:  Recent Labs     08/10/19  1147 08/10/19  1201 19  0446   WBC 5.5  --  5.0   RBC 4.51  --  3.36*   HGB 11.9  --  9.0*   HCT 41.8  --  31.2*   MCV 92.7  --  92.9   MCH 26.4  --  26.8   MCHC 28.5  --  28.8   RDW 19.0*  --  19.1*   PLT See Reflexed IPF Result  --  528*   MPV NOT REPORTED  --  9.2   INR  --  1.0 1.0     Chemistry:  Recent Labs     08/10/19  1147 19  0446    136   K 4.2 3.2*   CL 96* 101   CO2 18* 21   GLUCOSE 67* 105*   BUN 8 11   CREATININE 0.59 0.68   MG  --  1.7   ANIONGAP 21* 14   LABGLOM >60 >60   GFRAA >60 >60   CALCIUM 10.1 8.4*     Recent Labs     08/10/19  1147   PROT 9.2*   LABALBU 4.1   AST 32*   ALT 9   ALKPHOS 78   BILITOT 0.64     ABG:  Lab Results   Component Value Date    POCPH 7.366 2019    POCPCO2 51.0 2019    POCPO2 65.9 2019

## 2019-08-13 ENCOUNTER — APPOINTMENT (OUTPATIENT)
Dept: GENERAL RADIOLOGY | Age: 64
DRG: 301 | End: 2019-08-13
Payer: MEDICARE

## 2019-08-13 ENCOUNTER — ANESTHESIA EVENT (OUTPATIENT)
Dept: OPERATING ROOM | Age: 64
DRG: 301 | End: 2019-08-13
Payer: MEDICARE

## 2019-08-13 ENCOUNTER — ANESTHESIA (OUTPATIENT)
Dept: OPERATING ROOM | Age: 64
DRG: 301 | End: 2019-08-13
Payer: MEDICARE

## 2019-08-13 VITALS — DIASTOLIC BLOOD PRESSURE: 77 MMHG | SYSTOLIC BLOOD PRESSURE: 126 MMHG | OXYGEN SATURATION: 100 %

## 2019-08-13 PROBLEM — D75.839 THROMBOCYTOSIS: Status: ACTIVE | Noted: 2019-08-13

## 2019-08-13 PROBLEM — D64.9 ANEMIA: Status: ACTIVE | Noted: 2018-11-12

## 2019-08-13 LAB
ANION GAP SERPL CALCULATED.3IONS-SCNC: 15 MMOL/L (ref 9–17)
BLOOD BANK SPECIMEN: NORMAL
BUN BLDV-MCNC: 6 MG/DL (ref 8–23)
BUN/CREAT BLD: ABNORMAL (ref 9–20)
CALCIUM SERPL-MCNC: 8.9 MG/DL (ref 8.6–10.4)
CHLORIDE BLD-SCNC: 102 MMOL/L (ref 98–107)
CO2: 22 MMOL/L (ref 20–31)
CREAT SERPL-MCNC: 0.53 MG/DL (ref 0.5–0.9)
GFR AFRICAN AMERICAN: >60 ML/MIN
GFR NON-AFRICAN AMERICAN: >60 ML/MIN
GFR SERPL CREATININE-BSD FRML MDRD: ABNORMAL ML/MIN/{1.73_M2}
GFR SERPL CREATININE-BSD FRML MDRD: ABNORMAL ML/MIN/{1.73_M2}
GLUCOSE BLD-MCNC: 81 MG/DL (ref 70–99)
HCT VFR BLD CALC: 34.8 % (ref 36.3–47.1)
HEMOGLOBIN: 10.6 G/DL (ref 11.9–15.1)
MCH RBC QN AUTO: 27.6 PG (ref 25.2–33.5)
MCHC RBC AUTO-ENTMCNC: 30.5 G/DL (ref 28.4–34.8)
MCV RBC AUTO: 90.6 FL (ref 82.6–102.9)
NRBC AUTOMATED: 0 PER 100 WBC
PDW BLD-RTO: 19.4 % (ref 11.8–14.4)
PLATELET # BLD: 414 K/UL (ref 138–453)
PMV BLD AUTO: 9.4 FL (ref 8.1–13.5)
POTASSIUM SERPL-SCNC: 3.6 MMOL/L (ref 3.7–5.3)
RBC # BLD: 3.84 M/UL (ref 3.95–5.11)
SODIUM BLD-SCNC: 139 MMOL/L (ref 135–144)
WBC # BLD: 4.5 K/UL (ref 3.5–11.3)

## 2019-08-13 PROCEDURE — 2720000010 HC SURG SUPPLY STERILE: Performed by: ORTHOPAEDIC SURGERY

## 2019-08-13 PROCEDURE — C1776 JOINT DEVICE (IMPLANTABLE): HCPCS | Performed by: ORTHOPAEDIC SURGERY

## 2019-08-13 PROCEDURE — 2580000003 HC RX 258: Performed by: NURSE ANESTHETIST, CERTIFIED REGISTERED

## 2019-08-13 PROCEDURE — 73502 X-RAY EXAM HIP UNI 2-3 VIEWS: CPT

## 2019-08-13 PROCEDURE — 3600000014 HC SURGERY LEVEL 4 ADDTL 15MIN: Performed by: ORTHOPAEDIC SURGERY

## 2019-08-13 PROCEDURE — 6360000002 HC RX W HCPCS: Performed by: NURSE ANESTHETIST, CERTIFIED REGISTERED

## 2019-08-13 PROCEDURE — 86850 RBC ANTIBODY SCREEN: CPT

## 2019-08-13 PROCEDURE — 7100000001 HC PACU RECOVERY - ADDTL 15 MIN: Performed by: ORTHOPAEDIC SURGERY

## 2019-08-13 PROCEDURE — 0SR90JA REPLACEMENT OF RIGHT HIP JOINT WITH SYNTHETIC SUBSTITUTE, UNCEMENTED, OPEN APPROACH: ICD-10-PCS | Performed by: ORTHOPAEDIC SURGERY

## 2019-08-13 PROCEDURE — 36415 COLL VENOUS BLD VENIPUNCTURE: CPT

## 2019-08-13 PROCEDURE — 88304 TISSUE EXAM BY PATHOLOGIST: CPT

## 2019-08-13 PROCEDURE — 85027 COMPLETE CBC AUTOMATED: CPT

## 2019-08-13 PROCEDURE — 94640 AIRWAY INHALATION TREATMENT: CPT

## 2019-08-13 PROCEDURE — 1200000000 HC SEMI PRIVATE

## 2019-08-13 PROCEDURE — 2500000003 HC RX 250 WO HCPCS: Performed by: NURSE ANESTHETIST, CERTIFIED REGISTERED

## 2019-08-13 PROCEDURE — 6370000000 HC RX 637 (ALT 250 FOR IP): Performed by: STUDENT IN AN ORGANIZED HEALTH CARE EDUCATION/TRAINING PROGRAM

## 2019-08-13 PROCEDURE — 3600000004 HC SURGERY LEVEL 4 BASE: Performed by: ORTHOPAEDIC SURGERY

## 2019-08-13 PROCEDURE — 6360000002 HC RX W HCPCS: Performed by: INTERNAL MEDICINE

## 2019-08-13 PROCEDURE — 2500000003 HC RX 250 WO HCPCS: Performed by: STUDENT IN AN ORGANIZED HEALTH CARE EDUCATION/TRAINING PROGRAM

## 2019-08-13 PROCEDURE — 88311 DECALCIFY TISSUE: CPT

## 2019-08-13 PROCEDURE — 2709999900 HC NON-CHARGEABLE SUPPLY: Performed by: ORTHOPAEDIC SURGERY

## 2019-08-13 PROCEDURE — 6370000000 HC RX 637 (ALT 250 FOR IP): Performed by: INTERNAL MEDICINE

## 2019-08-13 PROCEDURE — 86900 BLOOD TYPING SEROLOGIC ABO: CPT

## 2019-08-13 PROCEDURE — 80048 BASIC METABOLIC PNL TOTAL CA: CPT

## 2019-08-13 PROCEDURE — 27130 TOTAL HIP ARTHROPLASTY: CPT | Performed by: ORTHOPAEDIC SURGERY

## 2019-08-13 PROCEDURE — 3700000000 HC ANESTHESIA ATTENDED CARE: Performed by: ORTHOPAEDIC SURGERY

## 2019-08-13 PROCEDURE — 3700000001 HC ADD 15 MINUTES (ANESTHESIA): Performed by: ORTHOPAEDIC SURGERY

## 2019-08-13 PROCEDURE — 86901 BLOOD TYPING SEROLOGIC RH(D): CPT

## 2019-08-13 PROCEDURE — 99232 SBSQ HOSP IP/OBS MODERATE 35: CPT | Performed by: PHYSICIAN ASSISTANT

## 2019-08-13 PROCEDURE — 6370000000 HC RX 637 (ALT 250 FOR IP): Performed by: NURSE PRACTITIONER

## 2019-08-13 PROCEDURE — 2580000003 HC RX 258: Performed by: ORTHOPAEDIC SURGERY

## 2019-08-13 PROCEDURE — 7100000000 HC PACU RECOVERY - FIRST 15 MIN: Performed by: ORTHOPAEDIC SURGERY

## 2019-08-13 PROCEDURE — 2580000003 HC RX 258: Performed by: STUDENT IN AN ORGANIZED HEALTH CARE EDUCATION/TRAINING PROGRAM

## 2019-08-13 PROCEDURE — 86920 COMPATIBILITY TEST SPIN: CPT

## 2019-08-13 DEVICE — MASTERLOC CEMENTLESS TI COATED STD STEM # 1
Type: IMPLANTABLE DEVICE | Status: FUNCTIONAL
Brand: MASTERLOC FEMORAL STEMS

## 2019-08-13 DEVICE — FEMORAL HEAD Ø 28 SIZE S
Type: IMPLANTABLE DEVICE | Site: HIP | Status: FUNCTIONAL
Brand: COCR FEMORAL BALL HEAD

## 2019-08-13 DEVICE — DOUBLE MOBILITY ACETABULAR SHELL Ø48
Type: IMPLANTABLE DEVICE | Site: HIP | Status: FUNCTIONAL
Brand: MPACT DOUBLE MOBILITY SHELLS

## 2019-08-13 DEVICE — COMPONENT TOT HIP CAPPED ADV LNR MTL CERM: Type: IMPLANTABLE DEVICE | Site: HIP | Status: FUNCTIONAL

## 2019-08-13 DEVICE — HC PE LINER 28 / DMD
Type: IMPLANTABLE DEVICE | Site: HIP | Status: FUNCTIONAL
Brand: DOUBLE MOBILITY LINER

## 2019-08-13 RX ORDER — OXYCODONE HYDROCHLORIDE AND ACETAMINOPHEN 5; 325 MG/1; MG/1
1 TABLET ORAL EVERY 4 HOURS PRN
Status: DISCONTINUED | OUTPATIENT
Start: 2019-08-13 | End: 2019-08-13 | Stop reason: HOSPADM

## 2019-08-13 RX ORDER — HYDRALAZINE HYDROCHLORIDE 20 MG/ML
5 INJECTION INTRAMUSCULAR; INTRAVENOUS EVERY 10 MIN PRN
Status: DISCONTINUED | OUTPATIENT
Start: 2019-08-13 | End: 2019-08-13 | Stop reason: HOSPADM

## 2019-08-13 RX ORDER — KETAMINE HYDROCHLORIDE 10 MG/ML
INJECTION, SOLUTION INTRAMUSCULAR; INTRAVENOUS PRN
Status: DISCONTINUED | OUTPATIENT
Start: 2019-08-13 | End: 2019-08-13 | Stop reason: SDUPTHER

## 2019-08-13 RX ORDER — PROMETHAZINE HYDROCHLORIDE 25 MG/ML
6.25 INJECTION, SOLUTION INTRAMUSCULAR; INTRAVENOUS
Status: DISCONTINUED | OUTPATIENT
Start: 2019-08-13 | End: 2019-08-13 | Stop reason: HOSPADM

## 2019-08-13 RX ORDER — SODIUM CHLORIDE, SODIUM LACTATE, POTASSIUM CHLORIDE, CALCIUM CHLORIDE 600; 310; 30; 20 MG/100ML; MG/100ML; MG/100ML; MG/100ML
INJECTION, SOLUTION INTRAVENOUS CONTINUOUS PRN
Status: DISCONTINUED | OUTPATIENT
Start: 2019-08-13 | End: 2019-08-13 | Stop reason: SDUPTHER

## 2019-08-13 RX ORDER — FENTANYL CITRATE 50 UG/ML
25 INJECTION, SOLUTION INTRAMUSCULAR; INTRAVENOUS EVERY 5 MIN PRN
Status: DISCONTINUED | OUTPATIENT
Start: 2019-08-13 | End: 2019-08-13 | Stop reason: HOSPADM

## 2019-08-13 RX ORDER — CLINDAMYCIN PHOSPHATE 900 MG/50ML
900 INJECTION INTRAVENOUS EVERY 8 HOURS
Status: COMPLETED | OUTPATIENT
Start: 2019-08-13 | End: 2019-08-14

## 2019-08-13 RX ORDER — LABETALOL HYDROCHLORIDE 5 MG/ML
5 INJECTION, SOLUTION INTRAVENOUS EVERY 10 MIN PRN
Status: DISCONTINUED | OUTPATIENT
Start: 2019-08-13 | End: 2019-08-13 | Stop reason: HOSPADM

## 2019-08-13 RX ORDER — PROPOFOL 10 MG/ML
INJECTION, EMULSION INTRAVENOUS CONTINUOUS PRN
Status: DISCONTINUED | OUTPATIENT
Start: 2019-08-13 | End: 2019-08-13 | Stop reason: SDUPTHER

## 2019-08-13 RX ORDER — DIPHENHYDRAMINE HYDROCHLORIDE 50 MG/ML
12.5 INJECTION INTRAMUSCULAR; INTRAVENOUS
Status: DISCONTINUED | OUTPATIENT
Start: 2019-08-13 | End: 2019-08-13 | Stop reason: HOSPADM

## 2019-08-13 RX ORDER — 0.9 % SODIUM CHLORIDE 0.9 %
500 INTRAVENOUS SOLUTION INTRAVENOUS
Status: DISCONTINUED | OUTPATIENT
Start: 2019-08-13 | End: 2019-08-13 | Stop reason: HOSPADM

## 2019-08-13 RX ORDER — ONDANSETRON 2 MG/ML
INJECTION INTRAMUSCULAR; INTRAVENOUS PRN
Status: DISCONTINUED | OUTPATIENT
Start: 2019-08-13 | End: 2019-08-13 | Stop reason: SDUPTHER

## 2019-08-13 RX ORDER — BUPIVACAINE HYDROCHLORIDE 7.5 MG/ML
INJECTION, SOLUTION INTRASPINAL PRN
Status: DISCONTINUED | OUTPATIENT
Start: 2019-08-13 | End: 2019-08-13 | Stop reason: SDUPTHER

## 2019-08-13 RX ORDER — MAGNESIUM HYDROXIDE 1200 MG/15ML
LIQUID ORAL CONTINUOUS PRN
Status: COMPLETED | OUTPATIENT
Start: 2019-08-13 | End: 2019-08-13

## 2019-08-13 RX ORDER — PHENYLEPHRINE HYDROCHLORIDE 10 MG/ML
INJECTION INTRAVENOUS PRN
Status: DISCONTINUED | OUTPATIENT
Start: 2019-08-13 | End: 2019-08-13 | Stop reason: SDUPTHER

## 2019-08-13 RX ORDER — ONDANSETRON 2 MG/ML
4 INJECTION INTRAMUSCULAR; INTRAVENOUS
Status: DISCONTINUED | OUTPATIENT
Start: 2019-08-13 | End: 2019-08-13 | Stop reason: HOSPADM

## 2019-08-13 RX ADMIN — HYDROCODONE BITARTRATE AND ACETAMINOPHEN 2 TABLET: 5; 325 TABLET ORAL at 03:43

## 2019-08-13 RX ADMIN — HYDROCODONE BITARTRATE AND ACETAMINOPHEN 2 TABLET: 5; 325 TABLET ORAL at 21:21

## 2019-08-13 RX ADMIN — GUAIFENESIN 600 MG: 600 TABLET, EXTENDED RELEASE ORAL at 22:48

## 2019-08-13 RX ADMIN — SODIUM CHLORIDE, POTASSIUM CHLORIDE, SODIUM LACTATE AND CALCIUM CHLORIDE: 600; 310; 30; 20 INJECTION, SOLUTION INTRAVENOUS at 18:21

## 2019-08-13 RX ADMIN — ONDANSETRON 4 MG: 2 INJECTION, SOLUTION INTRAMUSCULAR; INTRAVENOUS at 18:26

## 2019-08-13 RX ADMIN — GABAPENTIN 300 MG: 300 CAPSULE ORAL at 13:38

## 2019-08-13 RX ADMIN — HYDROCODONE BITARTRATE AND ACETAMINOPHEN 2 TABLET: 5; 325 TABLET ORAL at 07:39

## 2019-08-13 RX ADMIN — Medication 250 MCG: at 02:50

## 2019-08-13 RX ADMIN — SODIUM CHLORIDE, POTASSIUM CHLORIDE, SODIUM LACTATE AND CALCIUM CHLORIDE: 600; 310; 30; 20 INJECTION, SOLUTION INTRAVENOUS at 15:25

## 2019-08-13 RX ADMIN — MOMETASONE FUROATE AND FORMOTEROL FUMARATE DIHYDRATE 2 PUFF: 100; 5 AEROSOL RESPIRATORY (INHALATION) at 08:39

## 2019-08-13 RX ADMIN — CLINDAMYCIN IN 5 PERCENT DEXTROSE 900 MG: 18 INJECTION, SOLUTION INTRAVENOUS at 00:47

## 2019-08-13 RX ADMIN — TRANEXAMIC ACID 1 G: 100 INJECTION, SOLUTION INTRAVENOUS at 18:41

## 2019-08-13 RX ADMIN — BUPIVACAINE HYDROCHLORIDE IN DEXTROSE 2 ML: 7.5 INJECTION, SOLUTION SUBARACHNOID at 17:13

## 2019-08-13 RX ADMIN — SODIUM CHLORIDE: 9 INJECTION, SOLUTION INTRAVENOUS at 22:43

## 2019-08-13 RX ADMIN — PHENYLEPHRINE HYDROCHLORIDE 100 MCG: 10 INJECTION INTRAVENOUS at 18:35

## 2019-08-13 RX ADMIN — TRANEXAMIC ACID 1 G: 100 INJECTION, SOLUTION INTRAVENOUS at 17:20

## 2019-08-13 RX ADMIN — ONDANSETRON 4 MG: 2 INJECTION INTRAMUSCULAR; INTRAVENOUS at 07:51

## 2019-08-13 RX ADMIN — Medication 2 G: at 17:16

## 2019-08-13 RX ADMIN — GABAPENTIN 300 MG: 300 CAPSULE ORAL at 07:45

## 2019-08-13 RX ADMIN — HYDROCODONE BITARTRATE AND ACETAMINOPHEN 2 TABLET: 5; 325 TABLET ORAL at 13:34

## 2019-08-13 RX ADMIN — CLINDAMYCIN IN 5 PERCENT DEXTROSE 900 MG: 18 INJECTION, SOLUTION INTRAVENOUS at 22:39

## 2019-08-13 RX ADMIN — CARVEDILOL 6.25 MG: 6.25 TABLET, FILM COATED ORAL at 07:45

## 2019-08-13 RX ADMIN — PROPOFOL 100 MCG/KG/MIN: 10 INJECTION, EMULSION INTRAVENOUS at 17:16

## 2019-08-13 RX ADMIN — KETAMINE HYDROCHLORIDE 100 MG: 10 INJECTION INTRAMUSCULAR; INTRAVENOUS at 17:16

## 2019-08-13 ASSESSMENT — PULMONARY FUNCTION TESTS
PIF_VALUE: 1
PIF_VALUE: 0
PIF_VALUE: 1
PIF_VALUE: 0
PIF_VALUE: 1
PIF_VALUE: 0
PIF_VALUE: 1
PIF_VALUE: 0
PIF_VALUE: 1
PIF_VALUE: 0
PIF_VALUE: 1
PIF_VALUE: 1
PIF_VALUE: 0
PIF_VALUE: 1
PIF_VALUE: 1
PIF_VALUE: 0
PIF_VALUE: 1
PIF_VALUE: 0
PIF_VALUE: 0
PIF_VALUE: 1

## 2019-08-13 ASSESSMENT — ENCOUNTER SYMPTOMS
CONSTIPATION: 0
SHORTNESS OF BREATH: 0
VOMITING: 0
COUGH: 0
CHEST TIGHTNESS: 0
DIARRHEA: 0
NAUSEA: 0
ABDOMINAL PAIN: 0
ABDOMINAL DISTENTION: 0

## 2019-08-13 ASSESSMENT — PAIN SCALES - GENERAL
PAINLEVEL_OUTOF10: 7
PAINLEVEL_OUTOF10: 7
PAINLEVEL_OUTOF10: 10
PAINLEVEL_OUTOF10: 8
PAINLEVEL_OUTOF10: 8
PAINLEVEL_OUTOF10: 7
PAINLEVEL_OUTOF10: 7

## 2019-08-13 ASSESSMENT — PAIN - FUNCTIONAL ASSESSMENT: PAIN_FUNCTIONAL_ASSESSMENT: 0-10

## 2019-08-13 NOTE — ANESTHESIA PRE PROCEDURE
JOSE Orlop, DO   300 mg at 08/13/19 1338    [MAR Hold] guaiFENesin (MUCINEX) extended release tablet 600 mg  600 mg Oral BID Rojean Ball Orlop, DO   600 mg at 08/12/19 2154    [MAR Hold] hydrOXYzine (ATARAX) tablet 25 mg  25 mg Oral Q8H PRN Rojean Ball Orlop, DO        [MAR Hold] ipratropium-albuterol (DUONEB) nebulizer solution 3 mL  1 vial Inhalation Q4H PRN Rojean Ball Orlop, DO        [MAR Hold] melatonin tablet 1 mg  1 mg Oral Nightly PRN Rojean Ball Orlop, DO        [MAR Hold] mometasone-formoterol (DULERA) 100-5 MCG/ACT inhaler 2 puff  2 puff Inhalation BID Rojean Ball Orlop, DO   2 puff at 08/13/19 0839    [MAR Hold] therapeutic multivitamin-minerals 1 tablet  1 tablet Oral Daily Sudarshan GARCIA Orlop, DO        [MAR Hold] pantoprazole (PROTONIX) tablet 40 mg  40 mg Oral BID AC Rojean Ball Orlop, DO   Stopped at 08/13/19 0553    [MAR Hold] vitamin B-1 (THIAMINE) tablet 100 mg  100 mg Oral Daily Rojean Ball Orlop, DO        [MAR Hold] vitamin B-12 (CYANOCOBALAMIN) tablet 250 mcg  250 mcg Oral Daily Rojean Ball Orlop, DO   250 mcg at 08/13/19 0250    [MAR Hold] sodium chloride flush 0.9 % injection 10 mL  10 mL Intravenous 2 times per day Erma Pressley, DO   10 mL at 08/10/19 2203    [MAR Hold] sodium chloride flush 0.9 % injection 10 mL  10 mL Intravenous PRN Rojean Ball Orlop, DO        [facility] Hold] potassium chloride (KLOR-CON M) extended release tablet 40 mEq  40 mEq Oral PRN Rojean Ball Orlop, DO   40 mEq at 08/11/19 0732    Or    [MAR Hold] potassium bicarb-citric acid (EFFER-K) effervescent tablet 40 mEq  40 mEq Oral PRN Rojean Ball Orlop, DO        Or    [facility] Hold] potassium chloride 10 mEq/100 mL IVPB (Peripheral Line)  10 mEq Intravenous PRN Souleymane Jolop, DO        [facility] Hold] magnesium sulfate 1 g in dextrose 5% 100 mL IVPB  1 g Intravenous PRN Souleymane Perry, DO        [MAR Hold] magnesium hydroxide (MILK OF MAGNESIA) 400 MG/5ML suspension 30 mL  30 mL Oral Daily PRN Souleymane Rasheedp, DO        [facility] Hold] ondansetron (ZOFRAN) injection 4 mg  4 mg Intravenous Q6H PRN Sim Orange Orlop, DO   4 mg at 08/13/19 0751    [MAR Hold] nicotine (NICODERM CQ) 21 MG/24HR 1 patch  1 patch Transdermal Daily PRN Sim Benoit Orlop, DO        [MAR Hold] HYDROcodone-acetaminophen (NORCO) 5-325 MG per tablet 1 tablet  1 tablet Oral Q4H PRN Mayra Shown, APRN - CNP        Or    [MAR Hold] HYDROcodone-acetaminophen (NORCO) 5-325 MG per tablet 2 tablet  2 tablet Oral Q4H PRN Mayra Shown, APRN - CNP   2 tablet at 08/13/19 1334       Allergies:     Allergies   Allergen Reactions    Aspirin Itching    Keflet [Cephalexin]      Tolerated penicillin V 12/13, tolerates cephalexin 6/14       Problem List:    Patient Active Problem List   Diagnosis Code    Essential hypertension I10    Hepatitis C B19.20    History of substance abuse Z87.898    Chronic heart failure with reduced ejection fraction and diastolic dysfunction (HCC) I50.42    COPD (chronic obstructive pulmonary disease) (HCC) O26.1    Alcoholic cirrhosis of liver without ascites ([facility] K70.30    PUD (peptic ulcer disease) K27.9    Anemia D64.9    Duodenal ulcer with hemorrhage K26.4    Psychosis, intermittent requring antipsychotic meds F29    Opiate withdrawal ([facility] F11.23    Severe sepsis ([facility] A41.9, R65.20    Pancreas head lesion S36.200A    Bacteremia R78.81    Polysubstance abuse ([address] F19.10    Cerebrovascular accident (CVA) ([facility] I63.9    Stroke-like symptoms R29.90    Chest pain R07.9    Abdominal pain R10.9    Syncope and collapse R55    Avascular necrosis of bone of right hip (HCC) M87.051    Closed fracture of head of right femur ([address] S72.051A    COPD without exacerbation (HCC) J44.9    Hypokalemia E87.6    History of hepatitis C Z86.19    Thrombocytosis (HCC) D47.3       Past Medical History:        Diagnosis Date    Appendicitis 3/27/2017    CHF (congestive heart failure) (HCC)     Chronic respiratory failure with hypoxia (If Applicable):  No results found for: LABABO, LABRH    Anesthesia Evaluation    Airway: Mallampati: II  TM distance: >3 FB   Neck ROM: full  Mouth opening: > = 3 FB Dental:      Comment: Missing upper and lower teeth  ? loose    Pulmonary:   (+) COPD:  decreased breath sounds,                             Cardiovascular:    (+) hypertension:, CHF:,         Rhythm: regular  Rate: normal                    Neuro/Psych:   (+) CVA:, headaches:, psychiatric history:            GI/Hepatic/Renal:   (+) GERD:, hepatitis: C, liver disease:,           Endo/Other:    (+) : arthritis: OA., .                  ROS comment: Avascular necrosis Abdominal:           Vascular: negative vascular ROS. echo  CONCLUSIONS    Summary  Limited Echo for LVEF,  Global left ventricular systolic function is normal. Estimated ejection  fraction is 60-65 % . No wall motion abnormality seen. Normal right ventricular systolic function. No pericardial effusion seen. Anesthesia Plan      spinal and general     ASA 4     (COPD  CHF HEP c  )      MIPS: Postoperative opioids intended and Prophylactic antiemetics administered. Anesthetic plan and risks discussed with patient. Use of blood products discussed with patient whom. Plan discussed with CRNA.                   Esther Melissa MD   8/13/2019

## 2019-08-13 NOTE — PLAN OF CARE
Problem: SAFETY  Goal: Free from accidental physical injury  Outcome: Ongoing     Problem: PAIN  Goal: Patient's pain/discomfort is manageable  Outcome: Ongoing     Problem: Falls - Risk of:  Goal: Will remain free from falls  Description  Will remain free from falls  Outcome: Ongoing  Goal: Absence of physical injury  Description  Absence of physical injury  Outcome: Ongoing     Problem: Pain:  Goal: Pain level will decrease  Description  Pain level will decrease  Outcome: Ongoing  Goal: Control of acute pain  Description  Control of acute pain  Outcome: Ongoing  Goal: Control of chronic pain  Description  Control of chronic pain  Outcome: Ongoing

## 2019-08-13 NOTE — PROGRESS NOTES
[facility]    Progress Note    8/13/2019    8:59 AM    Name:   Radha Brunner  MRN:     [id number]     Acct:      [de-identified]   Room:   [address] [place] [address] Day:  3  Admit Date:  8/10/2019 10:35 AM    PCP:   No primary care provider on file. Code Status:  Full Code    Subjective:     C/C:   Chief Complaint   Patient presents with    Hip Pain     right hip pain, awaiting surgery for hip fx per pt     Interval History Status: not changed. Lying in bed comfortably. No new complaints. Surgery scheduled for 1730. Pain well controlled. Defecating and urinating appropriately. Brief History:     The patient is a 75 y.o. Non-/non  female who presents with Hip Pain (right hip pain, awaiting surgery for hip fx per pt)   and she is admitted to the hospital for the management of right hip pain with avascular necrosis and possible new microfracture. This is a 68-year-old Afro-American female who presents with worsening right hip pain and inability to ambulate. She has known history of avascular necrosis and over the last several days has had increasing pain with decreased activity tolerance. Her pain is severe when trying to ambulate at 10 out of 10. Pain diminishes with rest and medications here. X-ray imaging has showed severe avascular necrosis and possible new microfracture of the femoral head. She is admitted for pain control and orthopedic evaluation. She denies any history of chest pain, exertional dyspnea or other complaints. She had a stress test in August 2018 which has been reviewed without evidence of ischemia. Review of Systems:     Review of Systems   Constitutional: Negative for chills, diaphoresis, fatigue and fever. Respiratory: Negative for cough, chest tightness and shortness of breath. Gastrointestinal: Negative for abdominal distention, abdominal pain, constipation, diarrhea, nausea and vomiting. NBEA NOT REPORTED 01/08/2019    PBEA 3 01/08/2019    [id number] 31 01/08/2019    NIAJ6TNT 92 01/08/2019    FIO2 NOT REPORTED 02/04/2019     Lab Results   Component Value Date/Time    SPECIAL NOT REPORTED 07/20/2019 08:32 PM     Lab Results   Component Value Date/Time    CULTURE NO SIGNIFICANT GROWTH 07/20/2019 08:32 PM       Radiology:  Xr Chest Portable    Result Date: 8/10/2019  No acute cardiopulmonary process. Xr Hip 2-3 Vw W Pelvis Right    Result Date: 8/10/2019  Stable right hip appearance. There is evidence of severe avascular necrosis with collapse of the right femoral head. Arthritic changes present in the right hip, with appearance of the right hip relatively stable since prior study. Microfracture along the articulating surface of the remaining femoral head is noted which could account for the patient's pain. Physical Examination:        General Appearance:  alert, well appearing, and in no acute distress  Mental status: oriented to person, place, and time with normal affect  Head:  normocephalic, atraumatic. Eye: no icterus, redness, conjunctiva clear  Mouth: mucous membranes moist  Lungs: Bilateral equal air entry, clear to ausculation, no wheezing, rales or rhonchi, normal effort  Cardiovascular: normal rate, regular rhythm, no murmur, gallop, rub.   Abdomen: Soft, nontender, nondistended, normal bowel sounds, no hepatomegaly or splenomegaly  Neurologic: There are no new focal motor or sensory deficits  Skin: No gross lesions, rashes, bruising or bleeding on exposed skin area  Extremities:  peripheral pulses palpable, no pedal edema or calf pain with palpation  Psych: normal affect    Assessment:        Hospital Problems           Last Modified POA    * (Principal) Avascular necrosis of bone of right hip ([address] 8/11/2019 Yes    Essential hypertension 8/11/2019 Yes    Closed fracture of head of right femur ([address] 8/11/2019 Yes    COPD without exacerbation ([address] 8/11/2019 Yes    Hypokalemia

## 2019-08-13 NOTE — ANESTHESIA PROCEDURE NOTES
Spinal Block    Patient location during procedure: OR  Start time: 8/13/2019 5:13 PM  Reason for block: primary anesthetic and at surgeon's request  Staffing  Anesthesiologist: Frannie Stockton MD  Resident/CRNA: JAYDON Szymanski CRNA  Performed: resident/CRNA   Preanesthetic Checklist  Completed: patient identified, site marked, surgical consent, pre-op evaluation, timeout performed, IV checked, risks and benefits discussed, monitors and equipment checked, anesthesia consent given, oxygen available and patient being monitored  Spinal Block  Patient position: sitting  Prep: Betadine  Patient monitoring: frequent blood pressure checks and continuous pulse ox  Approach: midline  Location: L3/L4  Provider prep: sterile gloves and mask  Local infiltration: lidocaine  Dose: 0.2  Agent: bupivacaine  Adjuvant: duramorph  Dose: 2  Dose: 2  Needle  Needle type: Quincke   Needle gauge: 22 G  Needle length: 3.5 in  Needle insertion depth: 4 cm  Assessment  Sensory level: T4  Swirl obtained: Yes  CSF: clear  Attempts: 1  Hemodynamics: stable

## 2019-08-14 PROBLEM — D75.839 THROMBOCYTOSIS: Status: RESOLVED | Noted: 2019-08-13 | Resolved: 2019-08-14

## 2019-08-14 PROBLEM — D62 POSTOPERATIVE ANEMIA DUE TO ACUTE BLOOD LOSS: Status: ACTIVE | Noted: 2019-08-14

## 2019-08-14 LAB
ANION GAP SERPL CALCULATED.3IONS-SCNC: 14 MMOL/L (ref 9–17)
BUN BLDV-MCNC: 9 MG/DL (ref 8–23)
BUN/CREAT BLD: ABNORMAL (ref 9–20)
CALCIUM SERPL-MCNC: 7.7 MG/DL (ref 8.6–10.4)
CHLORIDE BLD-SCNC: 101 MMOL/L (ref 98–107)
CO2: 21 MMOL/L (ref 20–31)
CREAT SERPL-MCNC: 0.76 MG/DL (ref 0.5–0.9)
GFR AFRICAN AMERICAN: >60 ML/MIN
GFR NON-AFRICAN AMERICAN: >60 ML/MIN
GFR SERPL CREATININE-BSD FRML MDRD: ABNORMAL ML/MIN/{1.73_M2}
GFR SERPL CREATININE-BSD FRML MDRD: ABNORMAL ML/MIN/{1.73_M2}
GLUCOSE BLD-MCNC: 138 MG/DL (ref 70–99)
HCT VFR BLD CALC: 21.1 % (ref 36.3–47.1)
HCT VFR BLD CALC: 30.6 % (ref 36.3–47.1)
HEMOGLOBIN: 6.3 G/DL (ref 11.9–15.1)
HEMOGLOBIN: 9.6 G/DL (ref 11.9–15.1)
MCH RBC QN AUTO: 27.2 PG (ref 25.2–33.5)
MCHC RBC AUTO-ENTMCNC: 29.9 G/DL (ref 28.4–34.8)
MCV RBC AUTO: 90.9 FL (ref 82.6–102.9)
NRBC AUTOMATED: 0 PER 100 WBC
PDW BLD-RTO: 20.5 % (ref 11.8–14.4)
PLATELET # BLD: 273 K/UL (ref 138–453)
PMV BLD AUTO: 9.8 FL (ref 8.1–13.5)
POTASSIUM SERPL-SCNC: 3.5 MMOL/L (ref 3.7–5.3)
RBC # BLD: 2.32 M/UL (ref 3.95–5.11)
SODIUM BLD-SCNC: 136 MMOL/L (ref 135–144)
WBC # BLD: 4.5 K/UL (ref 3.5–11.3)

## 2019-08-14 PROCEDURE — 2500000003 HC RX 250 WO HCPCS: Performed by: STUDENT IN AN ORGANIZED HEALTH CARE EDUCATION/TRAINING PROGRAM

## 2019-08-14 PROCEDURE — P9016 RBC LEUKOCYTES REDUCED: HCPCS

## 2019-08-14 PROCEDURE — 6360000002 HC RX W HCPCS: Performed by: PHYSICIAN ASSISTANT

## 2019-08-14 PROCEDURE — 85014 HEMATOCRIT: CPT

## 2019-08-14 PROCEDURE — 2580000003 HC RX 258: Performed by: STUDENT IN AN ORGANIZED HEALTH CARE EDUCATION/TRAINING PROGRAM

## 2019-08-14 PROCEDURE — 85018 HEMOGLOBIN: CPT

## 2019-08-14 PROCEDURE — 97162 PT EVAL MOD COMPLEX 30 MIN: CPT

## 2019-08-14 PROCEDURE — 97530 THERAPEUTIC ACTIVITIES: CPT

## 2019-08-14 PROCEDURE — 6370000000 HC RX 637 (ALT 250 FOR IP): Performed by: STUDENT IN AN ORGANIZED HEALTH CARE EDUCATION/TRAINING PROGRAM

## 2019-08-14 PROCEDURE — 36430 TRANSFUSION BLD/BLD COMPNT: CPT

## 2019-08-14 PROCEDURE — 1200000000 HC SEMI PRIVATE

## 2019-08-14 PROCEDURE — 36415 COLL VENOUS BLD VENIPUNCTURE: CPT

## 2019-08-14 PROCEDURE — 6370000000 HC RX 637 (ALT 250 FOR IP): Performed by: NURSE PRACTITIONER

## 2019-08-14 PROCEDURE — 85027 COMPLETE CBC AUTOMATED: CPT

## 2019-08-14 PROCEDURE — 94640 AIRWAY INHALATION TREATMENT: CPT

## 2019-08-14 PROCEDURE — 80048 BASIC METABOLIC PNL TOTAL CA: CPT

## 2019-08-14 PROCEDURE — 86900 BLOOD TYPING SEROLOGIC ABO: CPT

## 2019-08-14 PROCEDURE — 99232 SBSQ HOSP IP/OBS MODERATE 35: CPT | Performed by: PHYSICIAN ASSISTANT

## 2019-08-14 RX ORDER — SUMATRIPTAN 50 MG/1
50 TABLET, FILM COATED ORAL ONCE
Status: DISCONTINUED | OUTPATIENT
Start: 2019-08-14 | End: 2019-08-22 | Stop reason: HOSPADM

## 2019-08-14 RX ORDER — 0.9 % SODIUM CHLORIDE 0.9 %
250 INTRAVENOUS SOLUTION INTRAVENOUS ONCE
Status: DISCONTINUED | OUTPATIENT
Start: 2019-08-14 | End: 2019-08-20

## 2019-08-14 RX ORDER — 0.9 % SODIUM CHLORIDE 0.9 %
500 INTRAVENOUS SOLUTION INTRAVENOUS ONCE
Status: DISCONTINUED | OUTPATIENT
Start: 2019-08-14 | End: 2019-08-20

## 2019-08-14 RX ORDER — MORPHINE SULFATE 2 MG/ML
2 INJECTION, SOLUTION INTRAMUSCULAR; INTRAVENOUS ONCE
Status: COMPLETED | OUTPATIENT
Start: 2019-08-14 | End: 2019-08-14

## 2019-08-14 RX ORDER — LIDOCAINE 4 G/G
1 PATCH TOPICAL DAILY
Status: DISCONTINUED | OUTPATIENT
Start: 2019-08-14 | End: 2019-08-22 | Stop reason: HOSPADM

## 2019-08-14 RX ADMIN — GABAPENTIN 300 MG: 300 CAPSULE ORAL at 08:11

## 2019-08-14 RX ADMIN — HYDROCODONE BITARTRATE AND ACETAMINOPHEN 2 TABLET: 5; 325 TABLET ORAL at 06:20

## 2019-08-14 RX ADMIN — HYDROCODONE BITARTRATE AND ACETAMINOPHEN 2 TABLET: 5; 325 TABLET ORAL at 19:13

## 2019-08-14 RX ADMIN — CLINDAMYCIN IN 5 PERCENT DEXTROSE 900 MG: 18 INJECTION, SOLUTION INTRAVENOUS at 06:20

## 2019-08-14 RX ADMIN — Medication 250 MCG: at 08:12

## 2019-08-14 RX ADMIN — GABAPENTIN 300 MG: 300 CAPSULE ORAL at 22:45

## 2019-08-14 RX ADMIN — SODIUM CHLORIDE: 9 INJECTION, SOLUTION INTRAVENOUS at 22:48

## 2019-08-14 RX ADMIN — MORPHINE SULFATE 2 MG: 2 INJECTION, SOLUTION INTRAMUSCULAR; INTRAVENOUS at 09:34

## 2019-08-14 RX ADMIN — HYDROCODONE BITARTRATE AND ACETAMINOPHEN 2 TABLET: 5; 325 TABLET ORAL at 14:57

## 2019-08-14 RX ADMIN — ENOXAPARIN SODIUM 40 MG: 40 INJECTION SUBCUTANEOUS at 08:12

## 2019-08-14 RX ADMIN — HYDROCODONE BITARTRATE AND ACETAMINOPHEN 2 TABLET: 5; 325 TABLET ORAL at 23:22

## 2019-08-14 RX ADMIN — HYDROCODONE BITARTRATE AND ACETAMINOPHEN 2 TABLET: 5; 325 TABLET ORAL at 01:25

## 2019-08-14 RX ADMIN — MOMETASONE FUROATE AND FORMOTEROL FUMARATE DIHYDRATE 2 PUFF: 100; 5 AEROSOL RESPIRATORY (INHALATION) at 08:43

## 2019-08-14 RX ADMIN — PANTOPRAZOLE SODIUM 40 MG: 40 TABLET, DELAYED RELEASE ORAL at 17:30

## 2019-08-14 RX ADMIN — GABAPENTIN 300 MG: 300 CAPSULE ORAL at 14:57

## 2019-08-14 ASSESSMENT — ENCOUNTER SYMPTOMS
VOMITING: 0
DIARRHEA: 0
COUGH: 0
ABDOMINAL PAIN: 0
CONSTIPATION: 0
SHORTNESS OF BREATH: 0
ABDOMINAL DISTENTION: 0
CHEST TIGHTNESS: 0
NAUSEA: 0

## 2019-08-14 ASSESSMENT — PAIN DESCRIPTION - PAIN TYPE
TYPE: ACUTE PAIN;SURGICAL PAIN
TYPE: ACUTE PAIN

## 2019-08-14 ASSESSMENT — PAIN SCALES - GENERAL
PAINLEVEL_OUTOF10: 6
PAINLEVEL_OUTOF10: 8
PAINLEVEL_OUTOF10: 7
PAINLEVEL_OUTOF10: 7
PAINLEVEL_OUTOF10: 8
PAINLEVEL_OUTOF10: 10
PAINLEVEL_OUTOF10: 7
PAINLEVEL_OUTOF10: 8

## 2019-08-14 ASSESSMENT — PAIN DESCRIPTION - ORIENTATION
ORIENTATION: RIGHT
ORIENTATION: RIGHT

## 2019-08-14 ASSESSMENT — PAIN DESCRIPTION - LOCATION
LOCATION: HIP
LOCATION: HIP;HEAD

## 2019-08-14 ASSESSMENT — PAIN DESCRIPTION - DESCRIPTORS: DESCRIPTORS: ACHING

## 2019-08-14 ASSESSMENT — PAIN DESCRIPTION - FREQUENCY: FREQUENCY: CONTINUOUS

## 2019-08-14 NOTE — OP NOTE
addition, the patient was made  aware that physical therapy would be a large part of the recovery  and it would be necessary. The patient notes understanding its  instrumental to the therapy protocol. Furthermore because of the  patient's intractable pain, the normal route for total hip  arthroplasty is not being followed. As far as dental clearance and  making sure that the patient has significant preoperative workup  _____. She notes understanding and still wishes to proceed. She was identified preoperatively, where consent was obtained,  signed, placed on the chart. The procedure was described, the  questions were answered. The risks of the procedure were described  to include, but not limited to, risk of anesthesia; infection;  bleeding; need for further surgery in the future; numbness,  tingling, weakness, or pain to the right lower extremity;  leg-length inequality; hip dislocation; fracture; DVT, and death. She notes understanding of these risks and states she wishes to  proceed. She was administered IV antibiotics perioperatively. She was taken  to the operative suite and placed upon the operative table. Spinal  anesthesia was affected. The patient's foot and ankle were well  padded and placed in a traction foot cabrera [facility]. The left lower extremity was placed in a well-padded Porter stand. The right lower extremity was then prepped and draped in a sterile  fashion. After an appropriate surgical time-out, incision was made  over the extensor fascia kathleen. it should be noted that prior to  incision, 1 gm of IV tranexamic acid was administered  preoperatively and then a second 1 gm IV tranexamic acid was  administered just after closure of incision. Once the incision was  made through the skin and subcutaneous tissue, full-thickness skin  flaps were raised over the fascia of the fascia kathleen which was  incised sharply in line with the skin incision.   Subfascial  dissection was carried out anteriorly over the extensor fascia kathleen  exposing the tensor sartorial interval where the circumflex vessels  were identified and ligated. Retractors were then placed over the  superior and inferior femoral neck and a blunt dissection with a  Rich elevator was made just deep to the rectus femoris and the  iliocapsularis reflecting them from the anterior capsule. Anterior  capsulectomy was then affected. It was at this point noted that  there was significant collapse obtained preoperatively of the  femoral head. Femoral neck cut was then made at the appropriate  level of recess and femoral head was removed from the acetabulum. Significant avascular necrosis was appreciated. The acetabular labrum and pulvinar were then excised. Sequential  reaming of the acetabulum was affected to the appropriately sized  reamer and a dual-mobility monoblock cup from [address] [place] [address] was then  impacted and appropriate theta-angle inversion. There was noted  to be good purchase of the cup. Attention was then drawn to mobilization of the proximal femur  which was done by release of the pubofemoral and ischiofemoral  ligaments. The femur was then externally rotated extending and  adducted with a bump underneath the posterior aspect of the femur  elevating the proximal femur into the incision. Sequential box  osteotome canal finding reamer and broaching of the femoral canal  was then made. The femoral canal was noted to be quite tight and a  #1 broach could be fitted. Trials were made over the #1 broach  with a standard and not a lateral lock test as well as a negative  3.5-mm head and that hip plate was noted to be appropriate as was  offset when superimposed images were utilized for evaluation  intraoperatively. The hip was reduced, put through range of  motion, was found to be stable throughout arc of range of motion. Leg lengths were noted to be approximate.   The hip was then gently  dislocated and trial

## 2019-08-14 NOTE — PROGRESS NOTES
fever.   Respiratory: Negative for cough, chest tightness and shortness of breath. Gastrointestinal: Negative for abdominal distention, abdominal pain, constipation, diarrhea, nausea and vomiting. Genitourinary: Negative for dysuria and hematuria. Musculoskeletal: Positive for arthralgias. Neurological: Negative for dizziness, weakness, light-headedness, numbness and headaches. Medications: Allergies:     Allergies   Allergen Reactions    Aspirin Itching    Keflet [Cephalexin]      Tolerated penicillin V 12/13, tolerates cephalexin 6/14       Current Meds:   Scheduled Meds:    lidocaine  1 patch Transdermal Daily    enoxaparin  40 mg Subcutaneous Daily    sodium chloride  500 mL Intravenous Once    sodium chloride  250 mL Intravenous Once    morphine  2 mg Intravenous Once    sodium chloride flush  10 mL Intravenous 2 times per day    carvedilol  6.25 mg Oral BID WC    ferrous sulfate  325 mg Oral Daily    folic acid  1 mg Oral Daily    gabapentin  300 mg Oral TID    guaiFENesin  600 mg Oral BID    mometasone-formoterol  2 puff Inhalation BID    therapeutic multivitamin-minerals  1 tablet Oral Daily    pantoprazole  40 mg Oral BID AC    thiamine  100 mg Oral Daily    cyanocobalamin  250 mcg Oral Daily    sodium chloride flush  10 mL Intravenous 2 times per day     Continuous Infusions:    sodium chloride 75 mL/hr at 08/13/19 2243     PRN Meds: sodium chloride flush, acetaminophen, albuterol sulfate HFA, benzocaine-menthol, benzonatate, hydrOXYzine, ipratropium-albuterol, melatonin, sodium chloride flush, potassium chloride **OR** potassium alternative oral replacement **OR** potassium chloride, magnesium sulfate, magnesium hydroxide, ondansetron, nicotine, HYDROcodone 5 mg - acetaminophen **OR** HYDROcodone 5 mg - acetaminophen    Data:     Past Medical History:   has a past medical history of Appendicitis, CHF (congestive heart failure) ([facility] [address] Chronic respiratory failure with PROT, LABALBU, LABA1C, T5PQOCD, C3AFVVE, FT4, TSH, AST, ALT, LDH, GGT, ALKPHOS, LABGGT, BILITOT, BILIDIR, AMMONIA, AMYLASE, LIPASE, LACTATE, CHOL, HDL, LDLCHOLESTEROL, CHOLHDLRATIO, TRIG, VLDL, PYE80MY, PHENYTOIN, PHENYF, URICACID, POCGLU in the last 72 hours. ABG:  Lab Results   Component Value Date    POCPH 7.366 01/08/2019    POCPCO2 51.0 01/08/2019    POCPO2 65.9 01/08/2019    POCHCO3 29.2 01/08/2019    NBEA NOT REPORTED 01/08/2019    PBEA 3 01/08/2019    CMW8NOJ 31 01/08/2019    OACR2KUY 92 01/08/2019    FIO2 NOT REPORTED 02/04/2019     Lab Results   Component Value Date/Time    SPECIAL NOT REPORTED 07/20/2019 08:32 PM     Lab Results   Component Value Date/Time    CULTURE NO SIGNIFICANT GROWTH 07/20/2019 08:32 PM       Radiology:  Xr Chest Portable    Result Date: 8/10/2019  No acute cardiopulmonary process. Xr Hip 2-3 Vw W Pelvis Right    Result Date: 8/10/2019  Stable right hip appearance. There is evidence of severe avascular necrosis with collapse of the right femoral head. Arthritic changes present in the right hip, with appearance of the right hip relatively stable since prior study. Microfracture along the articulating surface of the remaining femoral head is noted which could account for the patient's pain. Physical Examination:        General Appearance:  alert, well appearing, and in no acute distress  Mental status: oriented to person, place, and time with normal affect  Head:  normocephalic, atraumatic. Eye: no icterus, redness, conjunctiva clear  Mouth: mucous membranes moist  Lungs: Bilateral equal air entry, clear to ausculation, no wheezing, rales or rhonchi, normal effort  Cardiovascular: normal rate, regular rhythm, no murmur, gallop, rub.   Abdomen: Soft, nontender, nondistended, normal bowel sounds, no hepatomegaly or splenomegaly  Neurologic: There are no new focal motor or sensory deficits  Skin: No gross lesions, rashes, bruising or bleeding on exposed skin

## 2019-08-14 NOTE — PROGRESS NOTES
Occupational Therapy Not Seen Note    DATE: 2019  Name: Juju Lindsey  : 1955  MRN: [id number]    Patient not available for Occupational Therapy due to:     Other: pt with low HGB 6.3 and low BP     Next Scheduled Treatment: check back 8/15/19    Electronically signed by NILDA Fuller on 2019 at 8:00 AM

## 2019-08-14 NOTE — PROGRESS NOTES
WFL  Strength RUE  Strength RUE: WFL  Strength LUE  Strength LUE: WFL     Sensation  Overall Sensation Status: WFL  Bed mobility  Rolling to Left: Moderate assistance  Rolling to Right: Moderate assistance  Supine to Sit: Moderate assistance  Sit to Supine: Moderate assistance  Scooting:  Moderate assistance  Transfers  Sit to Stand: Minimal Assistance  Stand to sit: Minimal Assistance  Ambulation  Ambulation?: Yes  Ambulation 1  Surface: level tile  Device: Rolling Walker  Assistance: Minimal assistance  Distance: amb 15 ft with a RW x min assist with WBAT R LE     Balance  Posture: Good  Sitting - Static: Good  Sitting - Dynamic: Fair  Standing - Static: Fair  Standing - Dynamic: Fair  Exercises  Hamstring Sets: x 10 R LE  Quad Sets: x 10 R LE  Heelslides: x 10 R LE  Gluteal Sets: x 10  Knee Long Arc Quad: x 10 R LE  Ankle Pumps: x 10 R LE     Plan   Plan  Times per week: BID  Current Treatment Recommendations: Strengthening, Transfer Training, Endurance Training, Gait Training, Functional Mobility Training, Stair training, Safety Education & Training  Safety Devices  Type of devices: Call light within reach, Patient at risk for falls, Left in bed, Nurse notified    G-Code       OutComes Score     AM-PAC Score  AM-PAC Inpatient Mobility Raw Score : 16 (08/14/19 1501)  AM-PAC Inpatient T-Scale Score : 40.78 (08/14/19 1501)  Mobility Inpatient CMS 0-100% Score: 54.16 (08/14/19 1501)  Mobility Inpatient CMS G-Code Modifier : CK (08/14/19 1501)     Goals  Short term goals  Time Frame for Short term goals: 10 visits  Short term goal 1: transfers with SBA  Short term goal 2: amb 200 ft with a RW x SBA with WBAT R LE  Short term goal 3: ascend/descend 4 steps with SBA  Short term goal 4: total hip exercises x SBA  Patient Goals   Patient goals : Return home     Therapy Time   Individual Concurrent Group Co-treatment   Time In 1299         Time Out 8113         Minutes 25             1 of [facility], PT

## 2019-08-14 NOTE — DISCHARGE INSTR - COC
OR       Immunization History: There is no immunization history for the selected administration types on file for this patient.     Active Problems:  Patient Active Problem List   Diagnosis Code    Essential hypertension I10    Hepatitis C B19.20    History of substance abuse Z87.898    Chronic heart failure with reduced ejection fraction and diastolic dysfunction (HCC) I50.42    COPD (chronic obstructive pulmonary disease) (HCC) Y21.2    Alcoholic cirrhosis of liver without ascites ([facility] [address] K70.30    PUD (peptic ulcer disease) K27.9    Anemia D64.9    Duodenal ulcer with hemorrhage K26.4    Psychosis, intermittent requring antipsychotic meds F29    Opiate withdrawal ([facility] [address] F11.23    Severe sepsis ([facility] [address] A41.9, R65.20    Pancreas head lesion S36.200A    Bacteremia R78.81    Polysubstance abuse ([facility] [address] F19.10    Cerebrovascular accident (CVA) ([facility] [address] I63.9    Stroke-like symptoms R29.90    Chest pain R07.9    Abdominal pain R10.9    Syncope and collapse R55    Avascular necrosis of bone of right hip (HCC) M87.051    Closed fracture of head of right femur (HCC) S72.051A    COPD without exacerbation (HCC) J44.9    Hypokalemia E87.6    History of hepatitis C Z86.19    Postoperative anemia due to acute blood loss D62       Isolation/Infection:   Isolation          No Isolation            Nurse Assessment:  Last Vital Signs: BP (!) 81/43   Pulse 70   Temp 97.4 °F (36.3 °C) (Oral)   Resp 17   Ht 5' (1.524 m)   Wt 95 lb (43.1 kg)   LMP 03/18/2015   SpO2 95%   BMI 18.55 kg/m²     Last documented pain score (0-10 scale): Pain Level: 8  Last Weight:   Wt Readings from Last 1 Encounters:   08/10/19 95 lb (43.1 kg)     Mental Status:  oriented, alert, coherent, logical, thought processes intact and able to concentrate and follow conversation    IV Access:  Extended Dwell Peripherial inserted 9/45/39 L Basilic     Nursing Mobility/ADLs:  Walking   Assisted  Transfer  Assisted  Bathing Independent  Dressing  Assisted  Toileting  assissted  Feeding  Independent  Med Admin  Independent  Med Delivery   whole    Wound Care Documentation and Therapy:        Elimination:  Continence:   · Bowel: Yes  Bladder: YES   Urinary Catheter: None   Colostomy/Ileostomy/Ileal Conduit: No       Date of Last BM: 8/21/19    Intake/Output Summary (Last 24 hours) at 8/14/2019 1120  Last data filed at 8/14/2019 1044  Gross per 24 hour   Intake 2800 ml   Output 800 ml   Net 2000 ml     I/O last 3 completed shifts: In: 2200 [P.O.:100; I.V.:2100]  Out: 800 [Urine:600; Blood:200]    Safety Concerns: At Risk for Falls    Impairments/Disabilities:      None    Nutrition Therapy:  Current Nutrition Therapy:   - Oral Diet:  General    Routes of Feeding: Oral  Liquids: Thin Liquids  Daily Fluid Restriction: no  Last Modified Barium Swallow with Video (Video Swallowing Test): not done    Treatments at the Time of Hospital Discharge:   Respiratory Treatments: ***  Oxygen Therapy:  is not on home oxygen therapy. Ventilator:    - No ventilator support    Rehab Therapies: Physical Therapy and Occupational Therapy  Weight Bearing Status/Restrictions: No weight bearing restirctions  Other Medical Equipment (for information only, NOT a DME order):   Walker, bedside commode  Other Treatments: ***    Patient's personal belongings (please select all that are sent with patient):  Glasses    RN SIGNATURE:  Electronically signed by Lazaro Bautista RN on 8/16/19 at 8:01 AM    CASE MANAGEMENT/SOCIAL WORK SECTION    Inpatient Status Date: ***    Readmission Risk Assessment Score:  Readmission Risk              Risk of Unplanned Readmission:        66           Discharging to Facility/ Agency   · Name: [facility] [address] [facility], [address] [facility] [address]         Phone: [phone]       Fax: [phone]        Dialysis Facility (if applicable)   · Name:  · Address:  · Dialysis Schedule:  · Phone:  · Fax:    Case Manager/ signature: Electronically signed by Shantel Lyle RN on 8/22/19 at 1:21 PM    PHYSICIAN SECTION    Prognosis: Fair    Condition at Discharge: Stable    Rehab Potential (if transferring to Rehab): Fair    Recommended Labs or Other Treatments After Discharge: PT and OT eval and treat  Follow with Dr Jermaine Ko orthopedics in 7 days  CBC on 8/24/19 and 8/25/19, and 8/27/19 results to SNF doctor  Comprehensive metabolic panel on 5/27/18, results to rehab doctor  DVT prophylaxis  Monitor vitals    Physician Certification: I certify the above information and transfer of Emi Daniels  is necessary for the continuing treatment of the diagnosis listed and that she requires [facility] for less 30 days.      Update Admission H&P: Changes in H&P as follows - see discharge summary    PHYSICIAN SIGNATURE:  Electronically signed by Jesse Myles MD on 8/21/19 at 2:45 PM

## 2019-08-15 LAB
ABO/RH: NORMAL
ANION GAP SERPL CALCULATED.3IONS-SCNC: 12 MMOL/L (ref 9–17)
ANTIBODY SCREEN: NEGATIVE
ARM BAND NUMBER: NORMAL
BLD PROD TYP BPU: NORMAL
BLD PROD TYP BPU: NORMAL
BUN BLDV-MCNC: 7 MG/DL (ref 8–23)
BUN/CREAT BLD: ABNORMAL (ref 9–20)
CALCIUM SERPL-MCNC: 7.2 MG/DL (ref 8.6–10.4)
CHLORIDE BLD-SCNC: 106 MMOL/L (ref 98–107)
CO2: 21 MMOL/L (ref 20–31)
CREAT SERPL-MCNC: 0.41 MG/DL (ref 0.5–0.9)
CROSSMATCH RESULT: NORMAL
CROSSMATCH RESULT: NORMAL
DISPENSE STATUS BLOOD BANK: NORMAL
DISPENSE STATUS BLOOD BANK: NORMAL
EXPIRATION DATE: NORMAL
GFR AFRICAN AMERICAN: >60 ML/MIN
GFR NON-AFRICAN AMERICAN: >60 ML/MIN
GFR SERPL CREATININE-BSD FRML MDRD: ABNORMAL ML/MIN/{1.73_M2}
GFR SERPL CREATININE-BSD FRML MDRD: ABNORMAL ML/MIN/{1.73_M2}
GLUCOSE BLD-MCNC: 193 MG/DL (ref 70–99)
HCT VFR BLD CALC: 29.7 % (ref 36.3–47.1)
HEMOGLOBIN: 9.3 G/DL (ref 11.9–15.1)
MCH RBC QN AUTO: 29 PG (ref 25.2–33.5)
MCHC RBC AUTO-ENTMCNC: 31.3 G/DL (ref 28.4–34.8)
MCV RBC AUTO: 92.5 FL (ref 82.6–102.9)
NRBC AUTOMATED: 0.4 PER 100 WBC
PDW BLD-RTO: 19.6 % (ref 11.8–14.4)
PLATELET # BLD: 352 K/UL (ref 138–453)
PMV BLD AUTO: 10.7 FL (ref 8.1–13.5)
POTASSIUM SERPL-SCNC: 3.7 MMOL/L (ref 3.7–5.3)
RBC # BLD: 3.21 M/UL (ref 3.95–5.11)
SODIUM BLD-SCNC: 139 MMOL/L (ref 135–144)
SURGICAL PATHOLOGY REPORT: NORMAL
TRANSFUSION STATUS: NORMAL
TRANSFUSION STATUS: NORMAL
UNIT DIVISION: 0
UNIT DIVISION: 0
UNIT NUMBER: NORMAL
UNIT NUMBER: NORMAL
WBC # BLD: 5.1 K/UL (ref 3.5–11.3)

## 2019-08-15 PROCEDURE — 6370000000 HC RX 637 (ALT 250 FOR IP): Performed by: STUDENT IN AN ORGANIZED HEALTH CARE EDUCATION/TRAINING PROGRAM

## 2019-08-15 PROCEDURE — 85027 COMPLETE CBC AUTOMATED: CPT

## 2019-08-15 PROCEDURE — 97116 GAIT TRAINING THERAPY: CPT

## 2019-08-15 PROCEDURE — 36591 DRAW BLOOD OFF VENOUS DEVICE: CPT

## 2019-08-15 PROCEDURE — 80048 BASIC METABOLIC PNL TOTAL CA: CPT

## 2019-08-15 PROCEDURE — 37799 UNLISTED PX VASCULAR SURGERY: CPT

## 2019-08-15 PROCEDURE — 97110 THERAPEUTIC EXERCISES: CPT

## 2019-08-15 PROCEDURE — 6370000000 HC RX 637 (ALT 250 FOR IP): Performed by: NURSE PRACTITIONER

## 2019-08-15 PROCEDURE — 1200000000 HC SEMI PRIVATE

## 2019-08-15 PROCEDURE — 6360000002 HC RX W HCPCS: Performed by: PHYSICIAN ASSISTANT

## 2019-08-15 PROCEDURE — 99232 SBSQ HOSP IP/OBS MODERATE 35: CPT | Performed by: INTERNAL MEDICINE

## 2019-08-15 PROCEDURE — 94640 AIRWAY INHALATION TREATMENT: CPT

## 2019-08-15 PROCEDURE — 36592 COLLECT BLOOD FROM PICC: CPT

## 2019-08-15 PROCEDURE — 36415 COLL VENOUS BLD VENIPUNCTURE: CPT

## 2019-08-15 PROCEDURE — 2580000003 HC RX 258: Performed by: STUDENT IN AN ORGANIZED HEALTH CARE EDUCATION/TRAINING PROGRAM

## 2019-08-15 RX ADMIN — MULTIPLE VITAMINS W/ MINERALS TAB 1 TABLET: TAB at 07:59

## 2019-08-15 RX ADMIN — HYDROCODONE BITARTRATE AND ACETAMINOPHEN 2 TABLET: 5; 325 TABLET ORAL at 11:40

## 2019-08-15 RX ADMIN — GUAIFENESIN 600 MG: 600 TABLET, EXTENDED RELEASE ORAL at 07:59

## 2019-08-15 RX ADMIN — PANTOPRAZOLE SODIUM 40 MG: 40 TABLET, DELAYED RELEASE ORAL at 07:59

## 2019-08-15 RX ADMIN — MOMETASONE FUROATE AND FORMOTEROL FUMARATE DIHYDRATE 2 PUFF: 100; 5 AEROSOL RESPIRATORY (INHALATION) at 21:21

## 2019-08-15 RX ADMIN — FOLIC ACID 1 MG: 1 TABLET ORAL at 07:59

## 2019-08-15 RX ADMIN — ENOXAPARIN SODIUM 40 MG: 40 INJECTION SUBCUTANEOUS at 07:59

## 2019-08-15 RX ADMIN — HYDROCODONE BITARTRATE AND ACETAMINOPHEN 2 TABLET: 5; 325 TABLET ORAL at 16:17

## 2019-08-15 RX ADMIN — HYDROCODONE BITARTRATE AND ACETAMINOPHEN 2 TABLET: 5; 325 TABLET ORAL at 21:25

## 2019-08-15 RX ADMIN — FERROUS SULFATE TAB EC 325 MG (65 MG FE EQUIVALENT) 325 MG: 325 (65 FE) TABLET DELAYED RESPONSE at 07:59

## 2019-08-15 RX ADMIN — Medication 1 MG: at 22:58

## 2019-08-15 RX ADMIN — Medication 100 MG: at 07:59

## 2019-08-15 RX ADMIN — SODIUM CHLORIDE: 9 INJECTION, SOLUTION INTRAVENOUS at 11:44

## 2019-08-15 RX ADMIN — GABAPENTIN 300 MG: 300 CAPSULE ORAL at 21:41

## 2019-08-15 RX ADMIN — GABAPENTIN 300 MG: 300 CAPSULE ORAL at 09:21

## 2019-08-15 RX ADMIN — GABAPENTIN 300 MG: 300 CAPSULE ORAL at 14:18

## 2019-08-15 RX ADMIN — Medication 250 MCG: at 09:26

## 2019-08-15 RX ADMIN — CARVEDILOL 6.25 MG: 6.25 TABLET, FILM COATED ORAL at 16:17

## 2019-08-15 RX ADMIN — CARVEDILOL 6.25 MG: 6.25 TABLET, FILM COATED ORAL at 07:59

## 2019-08-15 RX ADMIN — HYDROCODONE BITARTRATE AND ACETAMINOPHEN 2 TABLET: 5; 325 TABLET ORAL at 03:27

## 2019-08-15 RX ADMIN — HYDROCODONE BITARTRATE AND ACETAMINOPHEN 2 TABLET: 5; 325 TABLET ORAL at 07:39

## 2019-08-15 RX ADMIN — PANTOPRAZOLE SODIUM 40 MG: 40 TABLET, DELAYED RELEASE ORAL at 16:17

## 2019-08-15 RX ADMIN — MOMETASONE FUROATE AND FORMOTEROL FUMARATE DIHYDRATE 2 PUFF: 100; 5 AEROSOL RESPIRATORY (INHALATION) at 08:02

## 2019-08-15 RX ADMIN — Medication 10 ML: at 08:00

## 2019-08-15 ASSESSMENT — PAIN DESCRIPTION - ONSET: ONSET: ON-GOING

## 2019-08-15 ASSESSMENT — PAIN SCALES - GENERAL
PAINLEVEL_OUTOF10: 7
PAINLEVEL_OUTOF10: 6
PAINLEVEL_OUTOF10: 8
PAINLEVEL_OUTOF10: 5
PAINLEVEL_OUTOF10: 7
PAINLEVEL_OUTOF10: 8
PAINLEVEL_OUTOF10: 8
PAINLEVEL_OUTOF10: 7

## 2019-08-15 ASSESSMENT — PAIN DESCRIPTION - FREQUENCY: FREQUENCY: CONTINUOUS

## 2019-08-15 ASSESSMENT — ENCOUNTER SYMPTOMS
NAUSEA: 0
CHEST TIGHTNESS: 0
SHORTNESS OF BREATH: 0
ABDOMINAL PAIN: 0
ABDOMINAL DISTENTION: 0
COUGH: 0
CONSTIPATION: 0
VOMITING: 0
DIARRHEA: 0

## 2019-08-15 ASSESSMENT — PAIN DESCRIPTION - LOCATION
LOCATION: HIP
LOCATION: HIP

## 2019-08-15 ASSESSMENT — PAIN DESCRIPTION - PAIN TYPE
TYPE: SURGICAL PAIN
TYPE: ACUTE PAIN

## 2019-08-15 ASSESSMENT — PAIN DESCRIPTION - ORIENTATION
ORIENTATION: RIGHT
ORIENTATION: RIGHT

## 2019-08-15 ASSESSMENT — PAIN DESCRIPTION - PROGRESSION: CLINICAL_PROGRESSION: NOT CHANGED

## 2019-08-15 ASSESSMENT — PAIN DESCRIPTION - DESCRIPTORS: DESCRIPTORS: ACHING;SORE

## 2019-08-15 NOTE — PROGRESS NOTES
Removed bilat teds as requested per Dr Barrett Ovalle. Denies any numbness or tingling in extremities. Reports right heel pain.

## 2019-08-15 NOTE — PROGRESS NOTES
Physical Therapy  Facility/Department: [address] ORTHO/MED SURG  Daily Treatment Note  NAME: Emi Daniels  : 1955  MRN: [id number]    Date of Service: 8/15/2019    Discharge Recommendations:  Patient would benefit from continued therapy after discharge   PT Equipment Recommendations  Equipment Needed: Yes  Mobility Devices: Margaux Grimm: Rolling    Assessment   Body structures, Functions, Activity limitations: Decreased functional mobility ; Decreased endurance;Decreased strength  Assessment: Pt unsteady with ambulation 5ft bed to chair, required encouragement for mobility. Pt fearful of pain with WBing, self limiting ambulation distance. Would benefit from continued skilled PT services to address deficits  Prognosis: Good  PT Education: Precautions;Transfer Training;General Safety;Gait Training;Functional Mobility Training  REQUIRES PT FOLLOW UP: Yes  Activity Tolerance  Activity Tolerance: Patient limited by endurance; Patient limited by fatigue;Patient limited by pain     Patient Diagnosis(es): The primary encounter diagnosis was Avascular necrosis ([address] A diagnosis of History of total right hip replacement was also pertinent to this visit. has a past medical history of Appendicitis, CHF (congestive heart failure) (HCC), Chronic respiratory failure with hypoxia and hypercapnia (HCC), Chronic rhinitis, Cigarette smoker, COPD (chronic obstructive pulmonary disease) ([address] Depression, Dysphagia, Essential hypertension, GERD (gastroesophageal reflux disease), Heart failure, diastolic, with acute decompensation ([address] Hepatitis C, chronic ([address] History of smoking at least 1 pack per day for at least 30 years, Hyperlipidemia, Hypertension, Migraine, Moderate COPD (chronic obstructive pulmonary disease) ([address] Multiple transfusions, Osteoarthritis, Pneumonia, Scoliosis, and Substance abuse ([address] has a past surgical history that includes  section; MORIAH (2002);  Upper gastrointestinal endoscopy (2008); Gastric bypass surgery; Appendectomy (03/27/2017); laparoscopic appendectomy (N/A, 3/27/2017); Upper gastrointestinal endoscopy (11/05/2018); Upper gastrointestinal endoscopy (N/A, 11/5/2018); pr colonoscopy w/biopsy single/multiple (N/A, 11/10/2018); pr egd transoral biopsy single/multiple (N/A, 11/9/2018); hip surgery (Right, 08/13/2019); and Total hip arthroplasty (Right, 8/13/2019). Restrictions  Restrictions/Precautions  Restrictions/Precautions: Weight Bearing  Lower Extremity Weight Bearing Restrictions  Right Lower Extremity Weight Bearing: Weight Bearing As Tolerated  Position Activity Restriction  Other position/activity restrictions: Anterior hip precautions   No SLR's  Subjective   General  Chart Reviewed: Yes  Response To Previous Treatment: Patient reporting fatigue but able to participate. Family / Caregiver Present: No  Subjective  Subjective: Pt and RN agreeable to PT. Pt c/o pain in R hip \"I just can't get comfortable\". Reluctantly agreeable to mobility with encouragement  General Comment  Comments: Pt left in chair with call light within reach, alarm activated  Pain Screening  Patient Currently in Pain: Yes  Pain Assessment  Pain Assessment: 0-10  Pain Level: 8  Pain Type: Surgical pain  Pain Location: Hip  Pain Orientation: Right  Pain Descriptors: Aching; Sore  Pain Frequency: Continuous  Pain Onset: On-going  Clinical Progression: Not changed  Non-Pharmaceutical Pain Intervention(s): Ambulation/Increased Activity; Therapeutic presence  Vital Signs  Patient Currently in Pain: Yes       Orientation  Orientation  Overall Orientation Status: Within Functional Limits  Cognition      Objective   Bed mobility  Rolling to Left: Minimal assistance  Rolling to Right: Minimal assistance  Supine to Sit: Minimal assistance  Sit to Supine: (pt left seated in chair)  Scooting: Contact guard assistance  Comment: vc's for progression with fair return demonstration  Transfers  Sit to Stand: Minimal

## 2019-08-16 PROBLEM — E87.6 HYPOKALEMIA: Status: RESOLVED | Noted: 2019-08-11 | Resolved: 2019-08-16

## 2019-08-16 LAB
HCT VFR BLD CALC: 26.8 % (ref 36.3–47.1)
HEMOGLOBIN: 8.8 G/DL (ref 11.9–15.1)

## 2019-08-16 PROCEDURE — 97530 THERAPEUTIC ACTIVITIES: CPT

## 2019-08-16 PROCEDURE — 6370000000 HC RX 637 (ALT 250 FOR IP): Performed by: STUDENT IN AN ORGANIZED HEALTH CARE EDUCATION/TRAINING PROGRAM

## 2019-08-16 PROCEDURE — 36415 COLL VENOUS BLD VENIPUNCTURE: CPT

## 2019-08-16 PROCEDURE — 97535 SELF CARE MNGMENT TRAINING: CPT

## 2019-08-16 PROCEDURE — 94640 AIRWAY INHALATION TREATMENT: CPT

## 2019-08-16 PROCEDURE — 2580000003 HC RX 258: Performed by: STUDENT IN AN ORGANIZED HEALTH CARE EDUCATION/TRAINING PROGRAM

## 2019-08-16 PROCEDURE — 85018 HEMOGLOBIN: CPT

## 2019-08-16 PROCEDURE — 97110 THERAPEUTIC EXERCISES: CPT

## 2019-08-16 PROCEDURE — 85014 HEMATOCRIT: CPT

## 2019-08-16 PROCEDURE — 6370000000 HC RX 637 (ALT 250 FOR IP): Performed by: NURSE PRACTITIONER

## 2019-08-16 PROCEDURE — 99253 IP/OBS CNSLTJ NEW/EST LOW 45: CPT | Performed by: PHYSICAL MEDICINE & REHABILITATION

## 2019-08-16 PROCEDURE — 99232 SBSQ HOSP IP/OBS MODERATE 35: CPT | Performed by: INTERNAL MEDICINE

## 2019-08-16 PROCEDURE — 97166 OT EVAL MOD COMPLEX 45 MIN: CPT

## 2019-08-16 PROCEDURE — 1200000000 HC SEMI PRIVATE

## 2019-08-16 PROCEDURE — 97116 GAIT TRAINING THERAPY: CPT

## 2019-08-16 RX ORDER — OXYCODONE HYDROCHLORIDE AND ACETAMINOPHEN 5; 325 MG/1; MG/1
1 TABLET ORAL EVERY 4 HOURS PRN
Status: DISCONTINUED | OUTPATIENT
Start: 2019-08-16 | End: 2019-08-16

## 2019-08-16 RX ORDER — OXYCODONE HYDROCHLORIDE AND ACETAMINOPHEN 5; 325 MG/1; MG/1
2 TABLET ORAL EVERY 4 HOURS PRN
Status: DISCONTINUED | OUTPATIENT
Start: 2019-08-16 | End: 2019-08-16

## 2019-08-16 RX ORDER — OXYCODONE HYDROCHLORIDE 5 MG/1
5 TABLET ORAL EVERY 4 HOURS PRN
Status: DISCONTINUED | OUTPATIENT
Start: 2019-08-16 | End: 2019-08-22 | Stop reason: HOSPADM

## 2019-08-16 RX ORDER — OXYCODONE HYDROCHLORIDE 5 MG/1
10 TABLET ORAL EVERY 4 HOURS PRN
Status: DISCONTINUED | OUTPATIENT
Start: 2019-08-16 | End: 2019-08-22 | Stop reason: HOSPADM

## 2019-08-16 RX ADMIN — MOMETASONE FUROATE AND FORMOTEROL FUMARATE DIHYDRATE 2 PUFF: 100; 5 AEROSOL RESPIRATORY (INHALATION) at 07:41

## 2019-08-16 RX ADMIN — Medication 250 MCG: at 09:01

## 2019-08-16 RX ADMIN — CARVEDILOL 6.25 MG: 6.25 TABLET, FILM COATED ORAL at 17:47

## 2019-08-16 RX ADMIN — OXYCODONE HYDROCHLORIDE 10 MG: 5 TABLET ORAL at 21:39

## 2019-08-16 RX ADMIN — MOMETASONE FUROATE AND FORMOTEROL FUMARATE DIHYDRATE 2 PUFF: 100; 5 AEROSOL RESPIRATORY (INHALATION) at 19:38

## 2019-08-16 RX ADMIN — SODIUM CHLORIDE, PRESERVATIVE FREE 10 ML: 5 INJECTION INTRAVENOUS at 20:07

## 2019-08-16 RX ADMIN — HYDROCODONE BITARTRATE AND ACETAMINOPHEN 1 TABLET: 5; 325 TABLET ORAL at 13:34

## 2019-08-16 RX ADMIN — HYDROCODONE BITARTRATE AND ACETAMINOPHEN 2 TABLET: 5; 325 TABLET ORAL at 09:34

## 2019-08-16 RX ADMIN — GABAPENTIN 300 MG: 300 CAPSULE ORAL at 13:34

## 2019-08-16 RX ADMIN — HYDROCODONE BITARTRATE AND ACETAMINOPHEN 1 TABLET: 5; 325 TABLET ORAL at 13:35

## 2019-08-16 RX ADMIN — HYDROCODONE BITARTRATE AND ACETAMINOPHEN 2 TABLET: 5; 325 TABLET ORAL at 05:32

## 2019-08-16 RX ADMIN — HYDROCODONE BITARTRATE AND ACETAMINOPHEN 2 TABLET: 5; 325 TABLET ORAL at 17:46

## 2019-08-16 RX ADMIN — HYDROCODONE BITARTRATE AND ACETAMINOPHEN 2 TABLET: 5; 325 TABLET ORAL at 01:22

## 2019-08-16 RX ADMIN — GABAPENTIN 300 MG: 300 CAPSULE ORAL at 20:07

## 2019-08-16 RX ADMIN — CARVEDILOL 6.25 MG: 6.25 TABLET, FILM COATED ORAL at 09:02

## 2019-08-16 RX ADMIN — PANTOPRAZOLE SODIUM 40 MG: 40 TABLET, DELAYED RELEASE ORAL at 07:33

## 2019-08-16 RX ADMIN — GABAPENTIN 300 MG: 300 CAPSULE ORAL at 09:04

## 2019-08-16 ASSESSMENT — PAIN SCALES - GENERAL
PAINLEVEL_OUTOF10: 7
PAINLEVEL_OUTOF10: 4
PAINLEVEL_OUTOF10: 7
PAINLEVEL_OUTOF10: 8
PAINLEVEL_OUTOF10: 7
PAINLEVEL_OUTOF10: 4
PAINLEVEL_OUTOF10: 7
PAINLEVEL_OUTOF10: 4

## 2019-08-16 ASSESSMENT — ENCOUNTER SYMPTOMS
NAUSEA: 0
DIARRHEA: 0
CONSTIPATION: 0
CHEST TIGHTNESS: 0
VOMITING: 0
COUGH: 0
ABDOMINAL DISTENTION: 0
SHORTNESS OF BREATH: 0
ABDOMINAL PAIN: 0

## 2019-08-16 ASSESSMENT — PAIN DESCRIPTION - LOCATION
LOCATION: HIP

## 2019-08-16 ASSESSMENT — PAIN DESCRIPTION - ORIENTATION
ORIENTATION: RIGHT
ORIENTATION: RIGHT

## 2019-08-16 ASSESSMENT — PAIN DESCRIPTION - PAIN TYPE: TYPE: ACUTE PAIN

## 2019-08-16 NOTE — FLOWSHEET NOTE
Assessment:    Patient has had hip surgery and coping well. Has family support and welcomes spiritual care. Patient was happy to receive prayer. Patient states that she has lots of family and sometimes it is too much. Feels that she has been well taken care of by the hospital and very hopeful regarding her recovery. Used to attend [employer] but does not drive and has to rely on others so not going as often. Intervention:     provided space for patient to express feelings, thoughts, and concerns. Has family support system. Prayed with patient. Outcome:     Patient was receptive to spiritual care and values prayer. Hoping to be discharged soon. 08/15/19 2100   Encounter Summary   Services provided to: Patient   Referral/Consult From: [facility] Family members   Continue Visiting   ([phone])   Complexity of Encounter Moderate   Length of Encounter 15 minutes   Spiritual Assessment Completed Yes   Routine   Type Initial   Assessment Calm; Approachable; Hopeful;Coping   Intervention Active listening;Explored feelings, thoughts, concerns;Explored coping resources; Discussed illness/injury and it's impact; Discussed belief system/Jewish practices/malcolm;Discussed relationship with God;Prayer   Outcome Acceptance;Comfort;Expressed gratitude;Engaged in conversation;Encouraged; Hopeful     Electronically signed by Clyde Cunha on 8/15/2019 at 11:57 PM

## 2019-08-16 NOTE — CONSULTS
Inpatient consult to PM&R - Physiatry  Consult performed by: Dali García MD  Consult ordered by: Sirisha Stephen        Physical Medicine & Rehabilitation  Consult Note      Admitting Physician: Belinda Vickers MD    Primary Care Provider: No primary care provider on file. Reason for Consult:  Acute Inpatient Rehabilitation    Chief Complaint: Hip pain    History of Present Illness:  Referring Provider is requesting an evaluation for appropriate placement upon discharge from acute care. Ms. Rosalina Garcia is a 75 y.o. right handed male who was admitted to [facility] on 8/10/2019 with Hip Pain (right hip pain, awaiting surgery for hip fx per pt)    25-year-old female with history of CHF, COPD, substance abuse, chronic respiratory failure, hypertension, hyperlipidemia and hepatitis C presented with incapacitating right hip pain. Do not have history of avascular necrosis right hip. Pain progressive for last 2 weeks. No new trauma.     Orthopedics-unable to place weight on right lower extremity, underwent right hip hemiarthroplasty on 8/13/2019 by Dr. Sadi Coffman, weightbearing as tolerated right lower extremity, on Eliquis 2.5 for DVT prophylaxis-recommended for 6 weeks follow-up 10 to 14 days      Review of Systems:  Constitutional: negative for anorexia, chills, fatigue, fevers, sweats and weight loss  Eyes: negative for redness and visual disturbance  Ears, nose, mouth, throat, and face: negative for earaches, sore throat and tinnitus  Respiratory: negative for cough and shortness of breath  Cardiovascular: negative for chest pain, dyspnea and palpitations  Gastrointestinal: negative for abdominal pain, change in bowel habits, constipation, nausea and vomiting  Genitourinary:negative for dysuria, frequency, hesitancy and urinary incontinence  Integument/breast: negative for pruritus and rash  Musculoskeletal:negative for muscle weakness and stiff joints  Neurological: negative for dizziness, headaches and Once  0.9 % sodium chloride bolus, 250 mL, Intravenous, Once  SUMAtriptan (IMITREX) tablet 50 mg, 50 mg, Oral, Once  sodium chloride flush 0.9 % injection 10 mL, 10 mL, Intravenous, 2 times per day  sodium chloride flush 0.9 % injection 10 mL, 10 mL, Intravenous, PRN  acetaminophen (TYLENOL) tablet 650 mg, 650 mg, Oral, Q4H PRN  0.9 % sodium chloride infusion, , Intravenous, Continuous  albuterol sulfate  (90 Base) MCG/ACT inhaler 2 puff, 2 puff, Inhalation, Q4H PRN  benzocaine-menthol (CEPACOL SORE THROAT) lozenge 1 lozenge, 1 lozenge, Oral, Q2H PRN  benzonatate (TESSALON) capsule 100 mg, 100 mg, Oral, TID PRN  carvedilol (COREG) tablet 6.25 mg, 6.25 mg, Oral, BID WC  ferrous sulfate EC tablet 325 mg, 325 mg, Oral, Daily  folic acid (FOLVITE) tablet 1 mg, 1 mg, Oral, Daily  gabapentin (NEURONTIN) capsule 300 mg, 300 mg, Oral, TID  guaiFENesin (MUCINEX) extended release tablet 600 mg, 600 mg, Oral, BID  hydrOXYzine (ATARAX) tablet 25 mg, 25 mg, Oral, Q8H PRN  ipratropium-albuterol (DUONEB) nebulizer solution 3 mL, 1 vial, Inhalation, Q4H PRN  melatonin tablet 1 mg, 1 mg, Oral, Nightly PRN  mometasone-formoterol (DULERA) 100-5 MCG/ACT inhaler 2 puff, 2 puff, Inhalation, BID  therapeutic multivitamin-minerals 1 tablet, 1 tablet, Oral, Daily  pantoprazole (PROTONIX) tablet 40 mg, 40 mg, Oral, BID AC  vitamin B-1 (THIAMINE) tablet 100 mg, 100 mg, Oral, Daily  vitamin B-12 (CYANOCOBALAMIN) tablet 250 mcg, 250 mcg, Oral, Daily  sodium chloride flush 0.9 % injection 10 mL, 10 mL, Intravenous, 2 times per day  sodium chloride flush 0.9 % injection 10 mL, 10 mL, Intravenous, PRN  potassium chloride (KLOR-CON M) extended release tablet 40 mEq, 40 mEq, Oral, PRN **OR** potassium bicarb-citric acid (EFFER-K) effervescent tablet 40 mEq, 40 mEq, Oral, PRN **OR** potassium chloride 10 mEq/100 mL IVPB (Peripheral Line), 10 mEq, Intravenous, PRN  magnesium sulfate 1 g in dextrose 5% 100 mL IVPB, 1 g, Intravenous, trend down, monitor  3. COPD albuterol, Fenesin, DuoNeb, Dulera  4. Hypertension/hyperlipidemia CHF history-Coreg  5. Hepatitis C/alcoholic cirrhosis  6. Pain-Norco, Neurontin Lidoderm patch-consider avoid Tylenol due to history of hepatitis/alcohol cirrhosis  7. Smoker-NicoDerm patch     Recommendations:  1. Diagnosis: Right total hip arthroplasty  2. Therapy: Currently has PT needs, await OT evaluation- await updated notes  3. Medical  Necessity: As above, currently stable medically  4. Support: Clarify  5. Rehab recommendation: currently has PT needs, suspect will have OT needs - await eval, Has med necessity as above, Would rec acute inpt rehab, discussed with  - [place] insurance   6. DVT proph: Lovenox, however noted plan for Eliquis    It was my pleasure to evaluate Emi CHAUDHRY Jeremiah today. Please call with questions. Kinsey Potts. Sabi Turk MD          This note is created with the assistance of a speech recognition program.  While intending to generate a document that actually reflects the content of the visit, the document can still have some errors including those of syntax and sound a like substitutions which may escape proof reading.   In such instances, actual meaning can be extrapolated by contextual diversion

## 2019-08-16 NOTE — PROGRESS NOTES
Progress Note    Patient:  Emi Daniels  YOB: 1955     75 y.o. female    Subjective:  Patient seen and examined at bedside this morning. No complaints or concerns per patient this morning. Hip pain continues to improve. No acute issues overnight per nursing. Denies: fever/chills, HA, CP, SOB, N/V, dysuria, or numbness/tingling in extremities. PT: Ambulated 5 feet with rolling walker yesterday    Objective:   Vitals:    08/15/19 1945   BP: 129/74   Pulse: 89   Resp: 18   Temp: 97.5 °F (36.4 °C)   SpO2: 98%     Gen: NAD, cooperative  MSK: TTP around hip incision as expected. Optifoam in place, c/d/i. Compartments soft. 2+ DP pulse. TA/EHL/FHL/GS motor intact. Deep and Superficial Peroneal/Saphenous/Sural/Plantar SILT. Recent Labs     08/15/19  0520   WBC 5.1   HGB 9.3*   HCT 29.7*         K 3.7   BUN 7*   CREATININE 0.41*   GLUCOSE 193*       Meds: Lovenox  See rec for complete list    Assessment/Plan : 75 y.o. female w/ R AVN  s/p R BASILIO, POD#3    -Continue PT assessment. - Hb stable 9.3 yesterday. -Maintain optifoam dressing to anterior thigh  -Weight Bearing: As tolerated RLE  -Completed post op clindamycin  -IM on for medical management and pain control as primary team  -Tolerating PO intake well  -Ice (20 min, 1 hour off) and elevation for edema/pain control  -Encourage IS and deep breathing  -DVT ppx: EPC's. Lovenox Ok to restart chemical AC this morning, recommend switching to Eliquis 2.5 mg BID if medically able. DC home on six weeks chemical AC.   -Ok for DC from orthopedic perspective.   -Follow up with Dr. Jonn Torres in 10-14d post operatively.  Case management on for assistance  -Please page ortho with any questions    Mila Connors,   PGY-3 Orthopedic Surgery  5:19 AM 8/16/2019

## 2019-08-16 NOTE — PROGRESS NOTES
RW         Goals  Short term goals  Time Frame for Short term goals: 10 visits  Short term goal 1: transfers with SBA  Short term goal 2: amb 200 ft with a RW x SBA with WBAT R LE  Short term goal 3: ascend/descend 4 steps with SBA  Short term goal 4: total hip exercises x SBA  Patient Goals   Patient goals : Return home    Plan    Plan  Times per week: BID  Times per day: Daily  Current Treatment Recommendations: Strengthening, Transfer Training, Endurance Training, Gait Training, Functional Mobility Training, Stair training, Safety Education & Training  Safety Devices  Type of devices: Patient at risk for falls, Nurse notified, All fall risk precautions in place, Gait belt, Left in bed, Bed alarm in place  Restraints  Initially in place: No     Therapy Time   Individual Concurrent Group Co-treatment   Time In 1416         Time Out 1436         Minutes [address] [place], [place]

## 2019-08-16 NOTE — PROGRESS NOTES
Physical Therapy  Facility/Department: [address] ORTHO/MED SURG  Daily Treatment Note  NAME: Emi Daniels  : 1955  MRN: [id number]    Date of Service: 2019    Discharge Recommendations:  Patient would benefit from continued therapy after discharge      Assessment   Body structures, Functions, Activity limitations: Decreased functional mobility ; Decreased endurance;Decreased strength  Assessment: Improved ability to ambulate going 25 ft with a RW x min assist requiring some verbal cues for proper use of the RW. Prognosis: Good  Decision Making: Medium Complexity  PT Education: Precautions;Transfer Training;General Safety;Gait Training;Functional Mobility Training  REQUIRES PT FOLLOW UP: Yes  Activity Tolerance  Activity Tolerance: Patient limited by endurance; Patient limited by fatigue;Patient limited by pain     Patient Diagnosis(es): The primary encounter diagnosis was Avascular necrosis ([address] A diagnosis of History of total right hip replacement was also pertinent to this visit. has a past medical history of Appendicitis, CHF (congestive heart failure) (HCC), Chronic respiratory failure with hypoxia and hypercapnia (HCC), Chronic rhinitis, Cigarette smoker, COPD (chronic obstructive pulmonary disease) ([address] Depression, Dysphagia, Essential hypertension, GERD (gastroesophageal reflux disease), Heart failure, diastolic, with acute decompensation ([address] Hepatitis C, chronic ([address] History of smoking at least 1 pack per day for at least 30 years, Hyperlipidemia, Hypertension, Migraine, Moderate COPD (chronic obstructive pulmonary disease) ([address] Multiple transfusions, Osteoarthritis, Pneumonia, Scoliosis, and Substance abuse ([address] has a past surgical history that includes  section; LEEP (2002); Upper gastrointestinal endoscopy (); Gastric bypass surgery; Appendectomy (2017); laparoscopic appendectomy (N/A, 3/27/2017); Upper gastrointestinal endoscopy (2018);  Upper gastrointestinal endoscopy (N/A, 11/5/2018); pr colonoscopy w/biopsy single/multiple (N/A, 11/10/2018); pr egd transoral biopsy single/multiple (N/A, 11/9/2018); hip surgery (Right, 08/13/2019); and Total hip arthroplasty (Right, 8/13/2019). Restrictions  Restrictions/Precautions  Restrictions/Precautions: Weight Bearing  Lower Extremity Weight Bearing Restrictions  Right Lower Extremity Weight Bearing: Weight Bearing As Tolerated  Position Activity Restriction  Other position/activity restrictions: Anterior hip precautions   No SLR's  Subjective   General  Chart Reviewed: Yes  Response To Previous Treatment: Patient reporting fatigue but able to participate. Family / Caregiver Present: No  Subjective  Subjective: Pt and RN agreeable to PT.    Pain Screening  Patient Currently in Pain: Yes  Pain Assessment  Pain Assessment: 0-10  Pain Level: 4  Pain Location: Hip  Pain Orientation: Right  Vital Signs  Patient Currently in Pain: Yes       Orientation  Orientation  Overall Orientation Status: Within Functional Limits  Cognition      Objective   Bed mobility  Rolling to Right: Minimal assistance  Supine to Sit: Minimal assistance  Scooting: Contact guard assistance  Transfers  Sit to Stand: Minimal Assistance  Stand to sit: Minimal Assistance  Ambulation  Ambulation?: Yes  Ambulation 1  Surface: level tile  Device: Rolling Walker  Assistance: Minimal assistance  Quality of Gait: antalgic, unsteady, step to gait  Gait Deviations: Slow Geovanna;Decreased step length;Deviated path  Distance: amb 25 ft with a RW x min assist with WBAT R LE     Balance  Posture: Good  Sitting - Static: Good  Sitting - Dynamic: Good  Standing - Static: Fair  Standing - Dynamic: Fair;-  Exercises  Hamstring Sets: x 15 R LE  Quad Sets: x 15 R LE  Heelslides: x 15 R LE  Gluteal Sets: x20  Knee Long Arc Quad: x 15 R LE        G-Code     OutComes Score     AM-PAC Score  AM-PAC Inpatient Mobility Raw Score : 16 (08/14/19 1501)  AM-PAC Inpatient

## 2019-08-16 NOTE — PROGRESS NOTES
Occupational Therapy   Occupational Therapy Initial Assessment  Date: 2019   Patient Name: Mariposa Argueta  MRN: [id number]     : 1955    Date of Service: 2019    Discharge Recommendations:    Further therapy recommended at discharge. OT Equipment Recommendations  Equipment Needed: Yes  Mobility Devices: ADL Assistive Devices  ADL Assistive Devices: Reacher;Sock-Aid Hard;Dressing Stick    Assessment   Performance deficits / Impairments: Decreased functional mobility ; Decreased strength;Decreased endurance;Decreased ADL status; Decreased safe awareness;Decreased balance;Decreased high-level IADLs  Assessment: Patient demonstrates decreased endurance and strength impacting performance in ADL/IADL tasks. Patient is limited by needing education for AE education to complete ADL tasks while maintianing BASILIO precautions to complete. Patient is expected to need skilled OT services at this time to address functional limitations. Prognosis: Good  Patient Education: Patient educated on role of OT, OT POC, safety awareness with functional mobility/transfer tasks, hip precautions when completing ADL/IADL tasks -good return   REQUIRES OT FOLLOW UP: Yes  Activity Tolerance  Activity Tolerance: Patient Tolerated treatment well;Patient limited by pain  Safety Devices  Safety Devices in place: Yes  Type of devices: Left in bed;Nurse notified;Gait belt;Patient at risk for falls;Call light within reach; All fall risk precautions in place           Patient Diagnosis(es): The primary encounter diagnosis was Avascular necrosis ([facility] [address] A diagnosis of History of total right hip replacement was also pertinent to this visit.      has a past medical history of Appendicitis, CHF (congestive heart failure) (formerly [facility]), Chronic respiratory failure with hypoxia and hypercapnia ([address] Chronic rhinitis, Cigarette smoker, COPD (chronic obstructive pulmonary disease) ([address] Depression, Dysphagia, Essential hypertension, GERD (gastroesophageal

## 2019-08-16 NOTE — PROGRESS NOTES
Therapies:  · Oral Diet Orders: General   · Oral Diet intake: %  · Oral Nutrition Supplement (ONS) Orders: None  · Anthropometric Measures:  · Ht: 5' (152.4 cm)   · Current Body Wt: 95 lb (43.1 kg)  · Admission Body Wt: 95 lb (43.1 kg)  · Usual Body Wt: (95-97 lbs per pt )  · % Weight Change:  ,  38% wt reduction x 10 mo per EMR - d/t steroids per pt   · Ideal Body Wt: 100 lb (45.4 kg), % Ideal Body 95% (adm/ideal)  · BMI Classification: BMI 18.5 - 24.9 Normal Weight    Nutrition Interventions:   Continue current diet, Start ONS  Continued Inpatient Monitoring, Education Not Indicated    Nutrition Evaluation:   · Evaluation: Goals set   · Goals: Pt to consume >75% of meals/supplements     · Monitoring: Nutrition Progression, Meal Intake, Supplement Intake, Diet Tolerance, Skin Integrity, Wound Healing, I&O, Pertinent Labs, Weight, Monitor Bowel Function      Electronically signed by Mary Mcqueen RD, LD on 8/16/19 at 11:45 AM    Contact Number: [phone]

## 2019-08-17 PROCEDURE — 2580000003 HC RX 258: Performed by: STUDENT IN AN ORGANIZED HEALTH CARE EDUCATION/TRAINING PROGRAM

## 2019-08-17 PROCEDURE — 97116 GAIT TRAINING THERAPY: CPT

## 2019-08-17 PROCEDURE — 1200000000 HC SEMI PRIVATE

## 2019-08-17 PROCEDURE — 6360000002 HC RX W HCPCS: Performed by: STUDENT IN AN ORGANIZED HEALTH CARE EDUCATION/TRAINING PROGRAM

## 2019-08-17 PROCEDURE — 6370000000 HC RX 637 (ALT 250 FOR IP): Performed by: STUDENT IN AN ORGANIZED HEALTH CARE EDUCATION/TRAINING PROGRAM

## 2019-08-17 PROCEDURE — 6370000000 HC RX 637 (ALT 250 FOR IP): Performed by: NURSE PRACTITIONER

## 2019-08-17 PROCEDURE — 97535 SELF CARE MNGMENT TRAINING: CPT

## 2019-08-17 PROCEDURE — 94640 AIRWAY INHALATION TREATMENT: CPT

## 2019-08-17 PROCEDURE — 99231 SBSQ HOSP IP/OBS SF/LOW 25: CPT | Performed by: INTERNAL MEDICINE

## 2019-08-17 PROCEDURE — 6360000002 HC RX W HCPCS: Performed by: PHYSICIAN ASSISTANT

## 2019-08-17 PROCEDURE — 97110 THERAPEUTIC EXERCISES: CPT

## 2019-08-17 PROCEDURE — 97530 THERAPEUTIC ACTIVITIES: CPT

## 2019-08-17 RX ADMIN — OXYCODONE HYDROCHLORIDE 10 MG: 5 TABLET ORAL at 20:20

## 2019-08-17 RX ADMIN — GABAPENTIN 300 MG: 300 CAPSULE ORAL at 08:35

## 2019-08-17 RX ADMIN — PANTOPRAZOLE SODIUM 40 MG: 40 TABLET, DELAYED RELEASE ORAL at 06:04

## 2019-08-17 RX ADMIN — MOMETASONE FUROATE AND FORMOTEROL FUMARATE DIHYDRATE 2 PUFF: 100; 5 AEROSOL RESPIRATORY (INHALATION) at 07:46

## 2019-08-17 RX ADMIN — GABAPENTIN 300 MG: 300 CAPSULE ORAL at 20:21

## 2019-08-17 RX ADMIN — Medication 10 ML: at 20:21

## 2019-08-17 RX ADMIN — ENOXAPARIN SODIUM 40 MG: 40 INJECTION SUBCUTANEOUS at 08:35

## 2019-08-17 RX ADMIN — GABAPENTIN 300 MG: 300 CAPSULE ORAL at 12:58

## 2019-08-17 RX ADMIN — Medication 10 ML: at 08:03

## 2019-08-17 RX ADMIN — OXYCODONE HYDROCHLORIDE 10 MG: 5 TABLET ORAL at 09:58

## 2019-08-17 RX ADMIN — SODIUM CHLORIDE, PRESERVATIVE FREE 10 ML: 5 INJECTION INTRAVENOUS at 08:37

## 2019-08-17 RX ADMIN — ONDANSETRON 4 MG: 2 INJECTION INTRAMUSCULAR; INTRAVENOUS at 07:59

## 2019-08-17 RX ADMIN — OXYCODONE HYDROCHLORIDE 10 MG: 5 TABLET ORAL at 06:04

## 2019-08-17 RX ADMIN — Medication 100 MG: at 08:35

## 2019-08-17 RX ADMIN — Medication 250 MCG: at 08:34

## 2019-08-17 RX ADMIN — OXYCODONE HYDROCHLORIDE 10 MG: 5 TABLET ORAL at 01:30

## 2019-08-17 RX ADMIN — CARVEDILOL 6.25 MG: 6.25 TABLET, FILM COATED ORAL at 17:08

## 2019-08-17 RX ADMIN — MOMETASONE FUROATE AND FORMOTEROL FUMARATE DIHYDRATE 2 PUFF: 100; 5 AEROSOL RESPIRATORY (INHALATION) at 20:40

## 2019-08-17 RX ADMIN — GUAIFENESIN 600 MG: 600 TABLET, EXTENDED RELEASE ORAL at 20:21

## 2019-08-17 RX ADMIN — CARVEDILOL 6.25 MG: 6.25 TABLET, FILM COATED ORAL at 08:34

## 2019-08-17 ASSESSMENT — PAIN DESCRIPTION - ORIENTATION
ORIENTATION: RIGHT
ORIENTATION: RIGHT

## 2019-08-17 ASSESSMENT — PAIN SCALES - GENERAL
PAINLEVEL_OUTOF10: 4
PAINLEVEL_OUTOF10: 8
PAINLEVEL_OUTOF10: 7
PAINLEVEL_OUTOF10: 8
PAINLEVEL_OUTOF10: 5
PAINLEVEL_OUTOF10: 7
PAINLEVEL_OUTOF10: 6
PAINLEVEL_OUTOF10: 7
PAINLEVEL_OUTOF10: 8

## 2019-08-17 ASSESSMENT — ENCOUNTER SYMPTOMS
NAUSEA: 0
VOMITING: 0
ABDOMINAL PAIN: 0
SHORTNESS OF BREATH: 0
CONSTIPATION: 0
CHEST TIGHTNESS: 0
COUGH: 0
DIARRHEA: 0
ABDOMINAL DISTENTION: 0

## 2019-08-17 ASSESSMENT — PAIN DESCRIPTION - FREQUENCY: FREQUENCY: CONTINUOUS

## 2019-08-17 ASSESSMENT — PAIN DESCRIPTION - PAIN TYPE
TYPE: ACUTE PAIN
TYPE: ACUTE PAIN

## 2019-08-17 ASSESSMENT — PAIN DESCRIPTION - LOCATION
LOCATION: HIP
LOCATION: HIP

## 2019-08-17 ASSESSMENT — PAIN DESCRIPTION - DESCRIPTORS: DESCRIPTORS: ACHING;SORE

## 2019-08-17 NOTE — PROGRESS NOTES
[facility]), History of smoking at least 1 pack per day for at least 30 years, Hyperlipidemia, Hypertension, Migraine, Moderate COPD (chronic obstructive pulmonary disease) ([facility] [address] [facility] Multiple transfusions, Osteoarthritis, Pneumonia, Scoliosis, and Substance abuse ([facility] [address] Social History:   reports that she quit smoking about 9 months ago. Her smoking use included cigarettes. She started smoking about 49 years ago. She has a 40.00 pack-year smoking history. She has never used smokeless tobacco. She reports that she does not drink alcohol or use drugs. Family History:   Family History   Problem Relation Age of Onset    Breast Cancer Mother     Heart Disease Father        Vitals:  /64   Pulse 89   Temp 98.3 °F (36.8 °C) (Oral)   Resp 16   Ht 5' (1.524 m)   Wt 96 lb (43.5 kg)   LMP 2015   SpO2 99%   BMI 18.75 kg/m²   Temp (24hrs), Av.4 °F (36.9 °C), Min:98.3 °F (36.8 °C), Max:98.5 °F (36.9 °C)    No results for input(s): POCGLU in the last 72 hours. I/O (24Hr): Intake/Output Summary (Last 24 hours) at 2019 1307  Last data filed at 2019 1227  Gross per 24 hour   Intake 720 ml   Output 200 ml   Net 520 ml       Labs:  Hematology:  Recent Labs     19  1813 08/15/19  0520 19  0816   WBC  --  5.1  --    RBC  --  3.21*  --    HGB 9.6* 9.3* 8.8*   HCT 30.6* 29.7* 26.8*   MCV  --  92.5  --    MCH  --  29.0  --    MCHC  --  31.3  --    RDW  --  19.6*  --    PLT  --  352  --    MPV  --  10.7  --      Chemistry:  Recent Labs     08/15/19  0520      K 3.7      CO2 21   GLUCOSE 193*   BUN 7*   CREATININE 0.41*   ANIONGAP 12   LABGLOM >60   GFRAA >60   CALCIUM 7.2*     No results for input(s): PROT, LABALBU, LABA1C, J0QDDKZ, D1ZCBHC, FT4, TSH, AST, ALT, LDH, GGT, ALKPHOS, LABGGT, BILITOT, BILIDIR, AMMONIA, AMYLASE, LIPASE, LACTATE, CHOL, HDL, LDLCHOLESTEROL, CHOLHDLRATIO, TRIG, VLDL, DYC84CE, PHENYTOIN, PHENYF, URICACID, POCGLU in the last 72 hours.   ABG:  Lab Results

## 2019-08-17 NOTE — PROGRESS NOTES
Previous Treatment: Patient with no complaints from previous session. Family / Caregiver Present: No  Subjective  Subjective: Pt and RN agreeable to PT.  Needs encouragement  Pain Screening  Patient Currently in Pain: Yes  Vital Signs  Patient Currently in Pain: Yes       Orientation  Orientation  Overall Orientation Status: Within Normal Limits  Cognition      Objective   Bed mobility  Scooting: Contact guard assistance  Transfers  Sit to Stand: Contact guard assistance;Minimal Assistance  Stand to sit: Contact guard assistance;Minimal Assistance  Bed to Chair: Contact guard assistance  Ambulation  Ambulation?: Yes  Ambulation 1  Surface: level tile  Device: Rolling Walker  Assistance: Minimal assistance  Quality of Gait: antalgic, unsteady, step to gait  Gait Deviations: Slow Geovanna;Decreased step length  Distance: 3 ft pivot bed-commode-recliner  Stairs/Curb  Stairs?: No     Balance  Posture: Good  Sitting - Static: Good  Sitting - Dynamic: Good  Standing - Static: Fair  Comments: standing balance assessed with RW  Exercises  Comments: RLE AAROM heel slides x12, BLE partial bridges 2x10      Comment: pt toileted on commode during tx, educated pt on glute squeezes and ankle pumpos when in bed to faciiltate circulation     Goals  Short term goals  Time Frame for Short term goals: 10 visits  Short term goal 1: transfers with SBA  Short term goal 2: amb 200 ft with a RW x SBA with WBAT R LE  Short term goal 3: ascend/descend 4 steps with SBA  Short term goal 4: total hip exercises x SBA  Patient Goals   Patient goals : Return home      Therapy Time   Individual Concurrent Group Co-treatment   Time In  1000         Time Out  1030         Minutes  30                 KENA GONZALEZ, PTA

## 2019-08-17 NOTE — PLAN OF CARE
Problem: SAFETY  Goal: Free from accidental physical injury  Outcome: Met This Shift     Problem: Falls - Risk of:  Goal: Will remain free from falls  Description  Will remain free from falls  Outcome: Met This Shift  Goal: Absence of physical injury  Description  Absence of physical injury  Outcome: Met This Shift     Problem: PAIN  Goal: Patient's pain/discomfort is manageable  Outcome: Ongoing     Problem: Pain:  Goal: Pain level will decrease  Description  Pain level will decrease  Outcome: Ongoing  Goal: Control of acute pain  Description  Control of acute pain  Outcome: Ongoing  Goal: Control of chronic pain  Description  Control of chronic pain  Outcome: Ongoing     Problem: Discharge Planning:  Goal: Discharged to appropriate level of care  Description  Discharged to appropriate level of care  Outcome: Ongoing

## 2019-08-18 LAB
-: NORMAL
ABSOLUTE EOS #: 0.06 K/UL (ref 0–0.44)
ABSOLUTE IMMATURE GRANULOCYTE: 0.13 K/UL (ref 0–0.3)
ABSOLUTE LYMPH #: 1.83 K/UL (ref 1.1–3.7)
ABSOLUTE MONO #: 1.2 K/UL (ref 0.1–1.2)
AMORPHOUS: NORMAL
ANION GAP SERPL CALCULATED.3IONS-SCNC: 13 MMOL/L (ref 9–17)
BACTERIA: NORMAL
BASOPHILS # BLD: 0 % (ref 0–2)
BASOPHILS ABSOLUTE: 0 K/UL (ref 0–0.2)
BILIRUBIN URINE: ABNORMAL
BUN BLDV-MCNC: 8 MG/DL (ref 8–23)
BUN/CREAT BLD: ABNORMAL (ref 9–20)
CALCIUM SERPL-MCNC: 8.1 MG/DL (ref 8.6–10.4)
CASTS UA: NORMAL /LPF (ref 0–8)
CHLORIDE BLD-SCNC: 101 MMOL/L (ref 98–107)
CO2: 24 MMOL/L (ref 20–31)
COLOR: ABNORMAL
COMMENT UA: ABNORMAL
CREAT SERPL-MCNC: 0.45 MG/DL (ref 0.5–0.9)
CRYSTALS, UA: NORMAL /HPF
DIFFERENTIAL TYPE: ABNORMAL
EOSINOPHILS RELATIVE PERCENT: 1 % (ref 1–4)
EPITHELIAL CELLS UA: NORMAL /HPF (ref 0–5)
GFR AFRICAN AMERICAN: >60 ML/MIN
GFR NON-AFRICAN AMERICAN: >60 ML/MIN
GFR SERPL CREATININE-BSD FRML MDRD: ABNORMAL ML/MIN/{1.73_M2}
GFR SERPL CREATININE-BSD FRML MDRD: ABNORMAL ML/MIN/{1.73_M2}
GLUCOSE BLD-MCNC: 155 MG/DL (ref 70–99)
GLUCOSE URINE: NEGATIVE
HCT VFR BLD CALC: 27.8 % (ref 36.3–47.1)
HCT VFR BLD CALC: 29.1 % (ref 36.3–47.1)
HEMOGLOBIN: 8.6 G/DL (ref 11.9–15.1)
HEMOGLOBIN: 9 G/DL (ref 11.9–15.1)
IMMATURE GRANULOCYTES: 2 %
KETONES, URINE: ABNORMAL
LEUKOCYTE ESTERASE, URINE: ABNORMAL
LYMPHOCYTES # BLD: 29 % (ref 24–43)
MCH RBC QN AUTO: 28.7 PG (ref 25.2–33.5)
MCHC RBC AUTO-ENTMCNC: 30.9 G/DL (ref 28.4–34.8)
MCV RBC AUTO: 92.7 FL (ref 82.6–102.9)
MONOCYTES # BLD: 19 % (ref 3–12)
MORPHOLOGY: ABNORMAL
MORPHOLOGY: ABNORMAL
MUCUS: NORMAL
NITRITE, URINE: POSITIVE
NRBC AUTOMATED: 0.6 PER 100 WBC
OTHER OBSERVATIONS UA: NORMAL
PDW BLD-RTO: 21.9 % (ref 11.8–14.4)
PH UA: 6.5 (ref 5–8)
PLATELET # BLD: 363 K/UL (ref 138–453)
PLATELET ESTIMATE: ABNORMAL
PMV BLD AUTO: 10.7 FL (ref 8.1–13.5)
POTASSIUM SERPL-SCNC: 3.7 MMOL/L (ref 3.7–5.3)
PROTEIN UA: NEGATIVE
RBC # BLD: 3 M/UL (ref 3.95–5.11)
RBC # BLD: ABNORMAL 10*6/UL
RBC UA: NORMAL /HPF (ref 0–4)
RENAL EPITHELIAL, UA: NORMAL /HPF
SEG NEUTROPHILS: 49 % (ref 36–65)
SEGMENTED NEUTROPHILS ABSOLUTE COUNT: 3.08 K/UL (ref 1.5–8.1)
SODIUM BLD-SCNC: 138 MMOL/L (ref 135–144)
SPECIFIC GRAVITY UA: 1.02 (ref 1–1.03)
TRICHOMONAS: NORMAL
TURBIDITY: CLEAR
URINE HGB: NEGATIVE
UROBILINOGEN, URINE: ABNORMAL
WBC # BLD: 6.3 K/UL (ref 3.5–11.3)
WBC # BLD: ABNORMAL 10*3/UL
WBC UA: NORMAL /HPF (ref 0–5)
YEAST: NORMAL

## 2019-08-18 PROCEDURE — 6370000000 HC RX 637 (ALT 250 FOR IP): Performed by: INTERNAL MEDICINE

## 2019-08-18 PROCEDURE — 94640 AIRWAY INHALATION TREATMENT: CPT

## 2019-08-18 PROCEDURE — 1200000000 HC SEMI PRIVATE

## 2019-08-18 PROCEDURE — 36415 COLL VENOUS BLD VENIPUNCTURE: CPT

## 2019-08-18 PROCEDURE — 99232 SBSQ HOSP IP/OBS MODERATE 35: CPT | Performed by: INTERNAL MEDICINE

## 2019-08-18 PROCEDURE — 85018 HEMOGLOBIN: CPT

## 2019-08-18 PROCEDURE — 97116 GAIT TRAINING THERAPY: CPT

## 2019-08-18 PROCEDURE — 6370000000 HC RX 637 (ALT 250 FOR IP): Performed by: STUDENT IN AN ORGANIZED HEALTH CARE EDUCATION/TRAINING PROGRAM

## 2019-08-18 PROCEDURE — 97110 THERAPEUTIC EXERCISES: CPT

## 2019-08-18 PROCEDURE — 97530 THERAPEUTIC ACTIVITIES: CPT

## 2019-08-18 PROCEDURE — 85014 HEMATOCRIT: CPT

## 2019-08-18 PROCEDURE — 6370000000 HC RX 637 (ALT 250 FOR IP): Performed by: NURSE PRACTITIONER

## 2019-08-18 PROCEDURE — 81001 URINALYSIS AUTO W/SCOPE: CPT

## 2019-08-18 PROCEDURE — 2580000003 HC RX 258: Performed by: STUDENT IN AN ORGANIZED HEALTH CARE EDUCATION/TRAINING PROGRAM

## 2019-08-18 PROCEDURE — 87086 URINE CULTURE/COLONY COUNT: CPT

## 2019-08-18 PROCEDURE — 85025 COMPLETE CBC W/AUTO DIFF WBC: CPT

## 2019-08-18 PROCEDURE — 80048 BASIC METABOLIC PNL TOTAL CA: CPT

## 2019-08-18 PROCEDURE — 94760 N-INVAS EAR/PLS OXIMETRY 1: CPT

## 2019-08-18 RX ORDER — DOCUSATE SODIUM 100 MG/1
100 CAPSULE, LIQUID FILLED ORAL 2 TIMES DAILY
Status: DISCONTINUED | OUTPATIENT
Start: 2019-08-18 | End: 2019-08-22 | Stop reason: HOSPADM

## 2019-08-18 RX ORDER — LEVOFLOXACIN 500 MG/1
500 TABLET, FILM COATED ORAL DAILY
Status: DISCONTINUED | OUTPATIENT
Start: 2019-08-18 | End: 2019-08-20

## 2019-08-18 RX ADMIN — OXYCODONE HYDROCHLORIDE 10 MG: 5 TABLET ORAL at 06:21

## 2019-08-18 RX ADMIN — PANTOPRAZOLE SODIUM 40 MG: 40 TABLET, DELAYED RELEASE ORAL at 06:22

## 2019-08-18 RX ADMIN — MOMETASONE FUROATE AND FORMOTEROL FUMARATE DIHYDRATE 2 PUFF: 100; 5 AEROSOL RESPIRATORY (INHALATION) at 21:43

## 2019-08-18 RX ADMIN — LEVOFLOXACIN 500 MG: 500 TABLET, FILM COATED ORAL at 16:56

## 2019-08-18 RX ADMIN — Medication 10 ML: at 08:32

## 2019-08-18 RX ADMIN — OXYCODONE HYDROCHLORIDE 10 MG: 5 TABLET ORAL at 00:43

## 2019-08-18 RX ADMIN — PANTOPRAZOLE SODIUM 40 MG: 40 TABLET, DELAYED RELEASE ORAL at 15:14

## 2019-08-18 RX ADMIN — APIXABAN 2.5 MG: 2.5 TABLET, FILM COATED ORAL at 08:28

## 2019-08-18 RX ADMIN — CARVEDILOL 6.25 MG: 6.25 TABLET, FILM COATED ORAL at 08:29

## 2019-08-18 RX ADMIN — GABAPENTIN 300 MG: 300 CAPSULE ORAL at 08:28

## 2019-08-18 RX ADMIN — MOMETASONE FUROATE AND FORMOTEROL FUMARATE DIHYDRATE 2 PUFF: 100; 5 AEROSOL RESPIRATORY (INHALATION) at 08:04

## 2019-08-18 RX ADMIN — GABAPENTIN 300 MG: 300 CAPSULE ORAL at 20:46

## 2019-08-18 RX ADMIN — APIXABAN 2.5 MG: 2.5 TABLET, FILM COATED ORAL at 20:46

## 2019-08-18 RX ADMIN — GABAPENTIN 300 MG: 300 CAPSULE ORAL at 15:14

## 2019-08-18 RX ADMIN — Medication 250 MCG: at 08:29

## 2019-08-18 RX ADMIN — OXYCODONE HYDROCHLORIDE 10 MG: 5 TABLET ORAL at 20:45

## 2019-08-18 RX ADMIN — CARVEDILOL 6.25 MG: 6.25 TABLET, FILM COATED ORAL at 16:54

## 2019-08-18 ASSESSMENT — PAIN SCALES - GENERAL
PAINLEVEL_OUTOF10: 6
PAINLEVEL_OUTOF10: 5
PAINLEVEL_OUTOF10: 7
PAINLEVEL_OUTOF10: 8
PAINLEVEL_OUTOF10: 6
PAINLEVEL_OUTOF10: 7
PAINLEVEL_OUTOF10: 8
PAINLEVEL_OUTOF10: 7
PAINLEVEL_OUTOF10: 7

## 2019-08-18 ASSESSMENT — ENCOUNTER SYMPTOMS
COUGH: 0
ABDOMINAL DISTENTION: 0
ABDOMINAL PAIN: 0
VOMITING: 0
CHEST TIGHTNESS: 0
NAUSEA: 0
SHORTNESS OF BREATH: 0
DIARRHEA: 0
CONSTIPATION: 0

## 2019-08-18 NOTE — PROGRESS NOTES
This nurse was advised by dietary patient was seen taking medication from her purse. I asked pt about medication in her purse, she showed me two bottles and would not let me see the other. I advised Niece of this and she will look into pt purse to see what she has. Pt refuses to allow nurse to look, stating Jessica Strange is just nosey\" to niece.

## 2019-08-18 NOTE — PLAN OF CARE
Problem: SAFETY  Goal: Free from accidental physical injury  Outcome: Ongoing     Problem: PAIN  Goal: Patient's pain/discomfort is manageable  Outcome: Ongoing     Problem: Falls - Risk of:  Goal: Will remain free from falls  Description  Will remain free from falls  Outcome: Ongoing  Goal: Absence of physical injury  Description  Absence of physical injury  Outcome: Ongoing     Problem: Pain:  Goal: Pain level will decrease  Description  Pain level will decrease  Outcome: Ongoing  Goal: Control of acute pain  Description  Control of acute pain  Outcome: Ongoing  Goal: Control of chronic pain  Description  Control of chronic pain  Outcome: Ongoing     Problem: Musculor/Skeletal Functional Status  Goal: Highest potential functional level  Outcome: Ongoing     Problem: Discharge Planning:  Goal: Discharged to appropriate level of care  Description  Discharged to appropriate level of care  Outcome: Ongoing     Problem: Nutrition  Goal: Optimal nutrition therapy  Outcome: Ongoing

## 2019-08-18 NOTE — PROGRESS NOTES
Usama Baker 17    Progress Note    8/18/2019    1:36 PM    Name:   Shyanne Denis  MRN:     [id number]     Acct:      [de-identified]   Room:   [facility] Day:  8  Admit Date:  8/10/2019 10:35 AM    PCP:   No primary care provider on file. Code Status:  Full Code    Subjective:     C/C:   Chief Complaint   Patient presents with    Hip Pain     right hip pain, awaiting surgery for hip fx per pt     Interval History Status: not changed. Pt stable had one low grade fever , but says she was wearing lots of blankets    Brief History:     The patient is a 75 y.o. Non-/non  female who presents with Hip Pain (right hip pain, awaiting surgery for hip fx per pt)   and she is admitted to the hospital for the management of right hip pain with avascular necrosis and possible new microfracture. This is a 57-year-old Afro-American female who presents with worsening right hip pain and inability to ambulate. She has known history of avascular necrosis and over the last several days has had increasing pain with decreased activity tolerance. Her pain is severe when trying to ambulate at 10 out of 10. Pain diminishes with rest and medications here. X-ray imaging has showed severe avascular necrosis and possible new microfracture of the femoral head. She is admitted for pain control and orthopedic evaluation. She denies any history of chest pain, exertional dyspnea or other complaints. She had a stress test in August 2018 which has been reviewed without evidence of ischemia. Review of Systems:     Review of Systems   Constitutional: Negative for chills, diaphoresis, fatigue and fever. Respiratory: Negative for cough, chest tightness and shortness of breath. Gastrointestinal: Negative for abdominal distention, abdominal pain, constipation, diarrhea, nausea and vomiting. Genitourinary: Negative for dysuria and hematuria. Musculoskeletal: Positive for arthralgias. Neurological: Negative for dizziness, weakness, light-headedness, numbness and headaches. Medications: Allergies:     Allergies   Allergen Reactions    Aspirin Itching    Keflet [Cephalexin]      Tolerated penicillin V 12/13, tolerates cephalexin 6/14       Current Meds:   Scheduled Meds:    docusate sodium  100 mg Oral BID    apixaban  2.5 mg Oral BID    lidocaine  1 patch Transdermal Daily    sodium chloride  500 mL Intravenous Once    sodium chloride  250 mL Intravenous Once    SUMAtriptan  50 mg Oral Once    sodium chloride flush  10 mL Intravenous 2 times per day    carvedilol  6.25 mg Oral BID WC    ferrous sulfate  325 mg Oral Daily    folic acid  1 mg Oral Daily    gabapentin  300 mg Oral TID    guaiFENesin  600 mg Oral BID    mometasone-formoterol  2 puff Inhalation BID    therapeutic multivitamin-minerals  1 tablet Oral Daily    pantoprazole  40 mg Oral BID AC    thiamine  100 mg Oral Daily    cyanocobalamin  250 mcg Oral Daily    sodium chloride flush  10 mL Intravenous 2 times per day     Continuous Infusions:     PRN Meds: oxyCODONE **OR** oxyCODONE, sodium chloride flush, albuterol sulfate HFA, benzocaine-menthol, benzonatate, hydrOXYzine, ipratropium-albuterol, melatonin, sodium chloride flush, potassium chloride **OR** potassium alternative oral replacement **OR** potassium chloride, magnesium sulfate, magnesium hydroxide, ondansetron, nicotine    Data:     Past Medical History:   has a past medical history of Appendicitis, CHF (congestive heart failure) ([facility]), Chronic respiratory failure with hypoxia and hypercapnia ([facility]), Chronic rhinitis, Cigarette smoker, COPD (chronic obstructive pulmonary disease) ([facility] Depression, Dysphagia, Essential hypertension, GERD (gastroesophageal reflux disease), Heart failure, diastolic, with acute decompensation ([facility] Hepatitis C, chronic ([facility] History of smoking at least 1 pack per day Closed fracture of head of right femur ([facility] 8/11/2019 Yes    COPD without exacerbation ([facility] [facility] 8/11/2019 Yes    History of hepatitis C 8/11/2019 Yes    Overview Signed 8/11/2019  2:24 PM by Jayme Henry DO     Previously treated at [facility]         Postoperative anemia due to acute blood loss 8/14/2019 Yes          Plan:            1.  Orthopedics recommended Eliquis, change DVT prophylaxis to Eliquis  2.   Monitor vitals   Blood pressure is better  Check urinalysis as patient is complaining of urinary burning  Otherwise no leukocytosis, monitor closely  Physical therapy and occupational therapy evaluation  Stable for discharge once arrangements are made  Case management to work on discharge [id number] [address] Oni Ragland MD  8/18/2019  1:36 PM

## 2019-08-18 NOTE — PROGRESS NOTES
Physical Therapy  Facility/Department: [address] ORTHO/MED SURG  Daily Treatment Note  NAME: Emi Daniels  : 1955  MRN: [id number]    Date of Service: 2019    Discharge Recommendations:  Patient would benefit from continued therapy after discharge      Assessment      Activity Tolerance  Activity Tolerance: Patient limited by endurance; Patient limited by fatigue;Patient limited by pain     Patient Diagnosis(es): The primary encounter diagnosis was Avascular necrosis ([address] A diagnosis of History of total right hip replacement was also pertinent to this visit. has a past medical history of Appendicitis, CHF (congestive heart failure) (HCC), Chronic respiratory failure with hypoxia and hypercapnia (HCC), Chronic rhinitis, Cigarette smoker, COPD (chronic obstructive pulmonary disease) ([address] Depression, Dysphagia, Essential hypertension, GERD (gastroesophageal reflux disease), Heart failure, diastolic, with acute decompensation ([address] Hepatitis C, chronic ([address] History of smoking at least 1 pack per day for at least 30 years, Hyperlipidemia, Hypertension, Migraine, Moderate COPD (chronic obstructive pulmonary disease) ([address] Multiple transfusions, Osteoarthritis, Pneumonia, Scoliosis, and Substance abuse ([address] has a past surgical history that includes  section; LEEP (); Upper gastrointestinal endoscopy (); Gastric bypass surgery; Appendectomy (2017); laparoscopic appendectomy (N/A, 3/27/2017); Upper gastrointestinal endoscopy (2018); Upper gastrointestinal endoscopy (N/A, 2018); pr colonoscopy w/biopsy single/multiple (N/A, 11/10/2018); pr egd transoral biopsy single/multiple (N/A, 2018); hip surgery (Right, 2019); and Total hip arthroplasty (Right, 2019).     Restrictions  Restrictions/Precautions: Weight Bearing  Lower Extremity Weight Bearing Restrictions  Right Lower Extremity Weight Bearing: Weight Bearing As Tolerated  Position Activity Concurrent Group Co-treatment   Time In  1035         Time Out  1100         Minutes  25                 KENA Bowman, PTA

## 2019-08-19 PROBLEM — N30.00 ACUTE CYSTITIS WITHOUT HEMATURIA: Status: ACTIVE | Noted: 2019-08-19

## 2019-08-19 LAB
ABSOLUTE EOS #: 0.07 K/UL (ref 0–0.44)
ABSOLUTE IMMATURE GRANULOCYTE: 0.2 K/UL (ref 0–0.3)
ABSOLUTE LYMPH #: 1.63 K/UL (ref 1.1–3.7)
ABSOLUTE MONO #: 0.82 K/UL (ref 0.1–1.2)
BASOPHILS # BLD: 0 % (ref 0–2)
BASOPHILS ABSOLUTE: 0 K/UL (ref 0–0.2)
CULTURE: NORMAL
DIFFERENTIAL TYPE: ABNORMAL
EOSINOPHILS RELATIVE PERCENT: 1 % (ref 1–4)
HCT VFR BLD CALC: 28.1 % (ref 36.3–47.1)
HEMOGLOBIN: 8.2 G/DL (ref 11.9–15.1)
IMMATURE GRANULOCYTES: 3 %
LACTIC ACID, SEPSIS WHOLE BLOOD: 2.3 MMOL/L (ref 0.5–1.9)
LACTIC ACID, SEPSIS: ABNORMAL MMOL/L (ref 0.5–1.9)
LACTIC ACID, WHOLE BLOOD: 2.1 MMOL/L (ref 0.7–2.1)
LYMPHOCYTES # BLD: 24 % (ref 24–43)
Lab: NORMAL
MCH RBC QN AUTO: 28.3 PG (ref 25.2–33.5)
MCHC RBC AUTO-ENTMCNC: 29.2 G/DL (ref 28.4–34.8)
MCV RBC AUTO: 96.9 FL (ref 82.6–102.9)
MONOCYTES # BLD: 12 % (ref 3–12)
MORPHOLOGY: ABNORMAL
MORPHOLOGY: ABNORMAL
NRBC AUTOMATED: 0.4 PER 100 WBC
PDW BLD-RTO: 23 % (ref 11.8–14.4)
PLATELET # BLD: 413 K/UL (ref 138–453)
PLATELET ESTIMATE: ABNORMAL
PMV BLD AUTO: 10.8 FL (ref 8.1–13.5)
RBC # BLD: 2.9 M/UL (ref 3.95–5.11)
RBC # BLD: ABNORMAL 10*6/UL
SEG NEUTROPHILS: 60 % (ref 36–65)
SEGMENTED NEUTROPHILS ABSOLUTE COUNT: 4.08 K/UL (ref 1.5–8.1)
SPECIMEN DESCRIPTION: NORMAL
WBC # BLD: 6.8 K/UL (ref 3.5–11.3)
WBC # BLD: ABNORMAL 10*3/UL

## 2019-08-19 PROCEDURE — 6370000000 HC RX 637 (ALT 250 FOR IP): Performed by: INTERNAL MEDICINE

## 2019-08-19 PROCEDURE — 1200000000 HC SEMI PRIVATE

## 2019-08-19 PROCEDURE — 97110 THERAPEUTIC EXERCISES: CPT

## 2019-08-19 PROCEDURE — 76937 US GUIDE VASCULAR ACCESS: CPT

## 2019-08-19 PROCEDURE — 85025 COMPLETE CBC W/AUTO DIFF WBC: CPT

## 2019-08-19 PROCEDURE — 6370000000 HC RX 637 (ALT 250 FOR IP): Performed by: STUDENT IN AN ORGANIZED HEALTH CARE EDUCATION/TRAINING PROGRAM

## 2019-08-19 PROCEDURE — 97116 GAIT TRAINING THERAPY: CPT

## 2019-08-19 PROCEDURE — 99232 SBSQ HOSP IP/OBS MODERATE 35: CPT | Performed by: PHYSICAL MEDICINE & REHABILITATION

## 2019-08-19 PROCEDURE — 87040 BLOOD CULTURE FOR BACTERIA: CPT

## 2019-08-19 PROCEDURE — 83605 ASSAY OF LACTIC ACID: CPT

## 2019-08-19 PROCEDURE — 94640 AIRWAY INHALATION TREATMENT: CPT

## 2019-08-19 PROCEDURE — 2580000003 HC RX 258: Performed by: INTERNAL MEDICINE

## 2019-08-19 PROCEDURE — 2580000003 HC RX 258: Performed by: NURSE PRACTITIONER

## 2019-08-19 PROCEDURE — 6370000000 HC RX 637 (ALT 250 FOR IP): Performed by: NURSE PRACTITIONER

## 2019-08-19 PROCEDURE — 36415 COLL VENOUS BLD VENIPUNCTURE: CPT

## 2019-08-19 PROCEDURE — 97535 SELF CARE MNGMENT TRAINING: CPT

## 2019-08-19 PROCEDURE — 99232 SBSQ HOSP IP/OBS MODERATE 35: CPT | Performed by: INTERNAL MEDICINE

## 2019-08-19 RX ORDER — SODIUM CHLORIDE 9 MG/ML
INJECTION, SOLUTION INTRAVENOUS CONTINUOUS
Status: DISCONTINUED | OUTPATIENT
Start: 2019-08-19 | End: 2019-08-22 | Stop reason: HOSPADM

## 2019-08-19 RX ORDER — SODIUM CHLORIDE 0.9 % (FLUSH) 0.9 %
10 SYRINGE (ML) INJECTION PRN
Status: DISCONTINUED | OUTPATIENT
Start: 2019-08-19 | End: 2019-08-22 | Stop reason: HOSPADM

## 2019-08-19 RX ORDER — ACETAMINOPHEN 325 MG/1
650 TABLET ORAL EVERY 4 HOURS PRN
Status: DISCONTINUED | OUTPATIENT
Start: 2019-08-19 | End: 2019-08-22 | Stop reason: HOSPADM

## 2019-08-19 RX ORDER — TRAMADOL HYDROCHLORIDE 50 MG/1
50 TABLET ORAL EVERY 4 HOURS PRN
Status: DISCONTINUED | OUTPATIENT
Start: 2019-08-19 | End: 2019-08-22 | Stop reason: HOSPADM

## 2019-08-19 RX ORDER — SODIUM CHLORIDE 0.9 % (FLUSH) 0.9 %
10 SYRINGE (ML) INJECTION EVERY 12 HOURS SCHEDULED
Status: DISCONTINUED | OUTPATIENT
Start: 2019-08-19 | End: 2019-08-22 | Stop reason: HOSPADM

## 2019-08-19 RX ADMIN — Medication 100 MG: at 08:47

## 2019-08-19 RX ADMIN — LEVOFLOXACIN 500 MG: 500 TABLET, FILM COATED ORAL at 08:47

## 2019-08-19 RX ADMIN — OXYCODONE HYDROCHLORIDE 10 MG: 5 TABLET ORAL at 09:13

## 2019-08-19 RX ADMIN — MOMETASONE FUROATE AND FORMOTEROL FUMARATE DIHYDRATE 2 PUFF: 100; 5 AEROSOL RESPIRATORY (INHALATION) at 19:57

## 2019-08-19 RX ADMIN — Medication 10 ML: at 08:48

## 2019-08-19 RX ADMIN — PANTOPRAZOLE SODIUM 40 MG: 40 TABLET, DELAYED RELEASE ORAL at 15:55

## 2019-08-19 RX ADMIN — OXYCODONE HYDROCHLORIDE 10 MG: 5 TABLET ORAL at 13:31

## 2019-08-19 RX ADMIN — SODIUM CHLORIDE: 9 INJECTION, SOLUTION INTRAVENOUS at 19:04

## 2019-08-19 RX ADMIN — Medication 250 MCG: at 08:47

## 2019-08-19 RX ADMIN — MOMETASONE FUROATE AND FORMOTEROL FUMARATE DIHYDRATE 2 PUFF: 100; 5 AEROSOL RESPIRATORY (INHALATION) at 09:13

## 2019-08-19 RX ADMIN — PANTOPRAZOLE SODIUM 40 MG: 40 TABLET, DELAYED RELEASE ORAL at 06:40

## 2019-08-19 RX ADMIN — OXYCODONE HYDROCHLORIDE 10 MG: 5 TABLET ORAL at 17:55

## 2019-08-19 RX ADMIN — APIXABAN 2.5 MG: 2.5 TABLET, FILM COATED ORAL at 22:15

## 2019-08-19 RX ADMIN — DOCUSATE SODIUM 100 MG: 100 CAPSULE, LIQUID FILLED ORAL at 22:15

## 2019-08-19 RX ADMIN — OXYCODONE HYDROCHLORIDE 10 MG: 5 TABLET ORAL at 00:20

## 2019-08-19 RX ADMIN — GABAPENTIN 300 MG: 300 CAPSULE ORAL at 22:15

## 2019-08-19 RX ADMIN — CARVEDILOL 6.25 MG: 6.25 TABLET, FILM COATED ORAL at 15:55

## 2019-08-19 RX ADMIN — GABAPENTIN 300 MG: 300 CAPSULE ORAL at 08:47

## 2019-08-19 RX ADMIN — GUAIFENESIN 600 MG: 600 TABLET, EXTENDED RELEASE ORAL at 22:14

## 2019-08-19 RX ADMIN — OXYCODONE HYDROCHLORIDE 10 MG: 5 TABLET ORAL at 04:56

## 2019-08-19 RX ADMIN — DOCUSATE SODIUM 100 MG: 100 CAPSULE, LIQUID FILLED ORAL at 08:47

## 2019-08-19 RX ADMIN — OXYCODONE HYDROCHLORIDE 10 MG: 5 TABLET ORAL at 22:14

## 2019-08-19 RX ADMIN — MULTIPLE VITAMINS W/ MINERALS TAB 1 TABLET: TAB at 08:47

## 2019-08-19 RX ADMIN — SODIUM CHLORIDE: 9 INJECTION, SOLUTION INTRAVENOUS at 08:53

## 2019-08-19 RX ADMIN — GABAPENTIN 300 MG: 300 CAPSULE ORAL at 15:55

## 2019-08-19 RX ADMIN — APIXABAN 2.5 MG: 2.5 TABLET, FILM COATED ORAL at 08:54

## 2019-08-19 ASSESSMENT — PAIN DESCRIPTION - PROGRESSION
CLINICAL_PROGRESSION: NOT CHANGED

## 2019-08-19 ASSESSMENT — PAIN DESCRIPTION - ORIENTATION
ORIENTATION: RIGHT

## 2019-08-19 ASSESSMENT — PAIN DESCRIPTION - DESCRIPTORS
DESCRIPTORS: ACHING
DESCRIPTORS: ACHING;SORE
DESCRIPTORS: ACHING;SORE

## 2019-08-19 ASSESSMENT — ENCOUNTER SYMPTOMS
NAUSEA: 0
COUGH: 0
ABDOMINAL DISTENTION: 0
CHEST TIGHTNESS: 0
SHORTNESS OF BREATH: 0
CONSTIPATION: 0
VOMITING: 0
ABDOMINAL PAIN: 0
DIARRHEA: 0

## 2019-08-19 ASSESSMENT — PAIN DESCRIPTION - FREQUENCY
FREQUENCY: CONTINUOUS

## 2019-08-19 ASSESSMENT — PAIN DESCRIPTION - LOCATION
LOCATION: HIP

## 2019-08-19 ASSESSMENT — PAIN SCALES - GENERAL
PAINLEVEL_OUTOF10: 9
PAINLEVEL_OUTOF10: 8
PAINLEVEL_OUTOF10: 7
PAINLEVEL_OUTOF10: 6
PAINLEVEL_OUTOF10: 7
PAINLEVEL_OUTOF10: 7
PAINLEVEL_OUTOF10: 8
PAINLEVEL_OUTOF10: 7
PAINLEVEL_OUTOF10: 9
PAINLEVEL_OUTOF10: 7
PAINLEVEL_OUTOF10: 8

## 2019-08-19 ASSESSMENT — PAIN DESCRIPTION - PAIN TYPE
TYPE: ACUTE PAIN
TYPE: ACUTE PAIN;SURGICAL PAIN

## 2019-08-19 ASSESSMENT — PAIN DESCRIPTION - ONSET
ONSET: ON-GOING

## 2019-08-19 NOTE — PROGRESS NOTES
Physical Therapy  Facility/Department: [address] ORTHO/MED SURG  Daily Treatment Note  NAME: Emi Daniels  : 1955  MRN: [id number]    Date of Service: 2019    Discharge Recommendations:  Patient would benefit from continued therapy after discharge   PT Equipment Recommendations  Equipment Needed: No    Assessment   Body structures, Functions, Activity limitations: Decreased functional mobility ; Decreased endurance;Decreased strength  Assessment: pt required CGA for amb 70ft with RW, demo's decreased safety awareness and decreased endurance. Pt would benefit from continued skilled PT services to address deficits and maximize safety and independence  Prognosis: Good  PT Education: Precautions;Transfer Training;General Safety;Gait Training;Functional Mobility Training;Home Exercise Program  REQUIRES PT FOLLOW UP: Yes  Activity Tolerance  Activity Tolerance: Patient limited by endurance; Patient limited by fatigue;Patient limited by pain     Patient Diagnosis(es): The primary encounter diagnosis was Avascular necrosis ([address] A diagnosis of History of total right hip replacement was also pertinent to this visit. has a past medical history of Appendicitis, CHF (congestive heart failure) (HCC), Chronic respiratory failure with hypoxia and hypercapnia (HCC), Chronic rhinitis, Cigarette smoker, COPD (chronic obstructive pulmonary disease) ([address] Depression, Dysphagia, Essential hypertension, GERD (gastroesophageal reflux disease), Heart failure, diastolic, with acute decompensation ([address] Hepatitis C, chronic ([address] History of smoking at least 1 pack per day for at least 30 years, Hyperlipidemia, Hypertension, Migraine, Moderate COPD (chronic obstructive pulmonary disease) ([address] Multiple transfusions, Osteoarthritis, Pneumonia, Scoliosis, and Substance abuse ([address] has a past surgical history that includes  section; LEEP (); Upper gastrointestinal endoscopy ();  Gastric bypass surgery;

## 2019-08-19 NOTE — PROGRESS NOTES
Negative for dysuria and hematuria. Musculoskeletal: Positive for arthralgias. Neurological: Negative for dizziness, weakness, light-headedness, numbness and headaches. Medications: Allergies:     Allergies   Allergen Reactions    Aspirin Itching    Keflet [Cephalexin]      Tolerated penicillin V 12/13, tolerates cephalexin 6/14       Current Meds:   Scheduled Meds:    sodium chloride flush  10 mL Intravenous 2 times per day    docusate sodium  100 mg Oral BID    levofloxacin  500 mg Oral Daily    apixaban  2.5 mg Oral BID    lidocaine  1 patch Transdermal Daily    sodium chloride  500 mL Intravenous Once    sodium chloride  250 mL Intravenous Once    SUMAtriptan  50 mg Oral Once    sodium chloride flush  10 mL Intravenous 2 times per day    carvedilol  6.25 mg Oral BID WC    ferrous sulfate  325 mg Oral Daily    folic acid  1 mg Oral Daily    gabapentin  300 mg Oral TID    guaiFENesin  600 mg Oral BID    mometasone-formoterol  2 puff Inhalation BID    therapeutic multivitamin-minerals  1 tablet Oral Daily    pantoprazole  40 mg Oral BID AC    thiamine  100 mg Oral Daily    cyanocobalamin  250 mcg Oral Daily    sodium chloride flush  10 mL Intravenous 2 times per day     Continuous Infusions:    sodium chloride 100 mL/hr at 08/19/19 0853     PRN Meds: sodium chloride flush, acetaminophen, traMADol, oxyCODONE **OR** oxyCODONE, sodium chloride flush, albuterol sulfate HFA, benzocaine-menthol, benzonatate, hydrOXYzine, ipratropium-albuterol, melatonin, sodium chloride flush, potassium chloride **OR** potassium alternative oral replacement **OR** potassium chloride, magnesium sulfate, magnesium hydroxide, ondansetron, nicotine    Data:     Past Medical History:   has a past medical history of Appendicitis, CHF (congestive heart failure) ([facility]), Chronic respiratory failure with hypoxia and hypercapnia ([facility]), Chronic rhinitis, Cigarette smoker, COPD (chronic obstructive pulmonary disease) [facility] Depression, Dysphagia, Essential hypertension, GERD (gastroesophageal reflux disease), Heart failure, diastolic, with acute decompensation ([facility] Hepatitis C, chronic ([facility] History of smoking at least 1 pack per day for at least 30 years, Hyperlipidemia, Hypertension, Migraine, Moderate COPD (chronic obstructive pulmonary disease) ([facility] Multiple transfusions, Osteoarthritis, Pneumonia, Scoliosis, and Substance abuse ([facility] Social History:   reports that she quit smoking about 9 months ago. Her smoking use included cigarettes. She started smoking about 49 years ago. She has a 40.00 pack-year smoking history. She has never used smokeless tobacco. She reports that she does not drink alcohol or use drugs. Family History:   Family History   Problem Relation Age of Onset    Breast Cancer Mother     Heart Disease Father        Vitals:  BP (!) 125/57   Pulse 101   Temp 99.2 °F (37.3 °C)   Resp 18   Ht 5' (1.524 m)   Wt 96 lb (43.5 kg)   LMP 2015   SpO2 96%   BMI 18.75 kg/m²   Temp (24hrs), Av.9 °F (37.7 °C), Min:98.6 °F (37 °C), Max:101.4 °F (38.6 °C)    No results for input(s): POCGLU in the last 72 hours. I/O (24Hr):     Intake/Output Summary (Last 24 hours) at 2019 1246  Last data filed at 2019 1243  Gross per 24 hour   Intake 360 ml   Output 200 ml   Net 160 ml       Labs:  Hematology:  Recent Labs     19  0616 19  1144 19  0716   WBC  --  6.3 6.8   RBC  --  3.00* 2.90*   HGB 9.0* 8.6* 8.2*   HCT 29.1* 27.8* 28.1*   MCV  --  92.7 96.9   MCH  --  28.7 28.3   MCHC  --  30.9 29.2   RDW  --  21.9* 23.0*   PLT  --  363 413   MPV  --  10.7 10.8     Chemistry:  Recent Labs     19  1144      K 3.7      CO2 24   GLUCOSE 155*   BUN 8   CREATININE 0.45*   ANIONGAP 13   LABGLOM >60   GFRAA >60   CALCIUM 8.1*     No results for input(s): PROT, LABALBU, LABA1C, B6ENZDD, A1HBLLC, FT4, TSH, AST, ALT, LDH, GGT, ALKPHOS, LABGGT, BILITOT, BILIDIR, AMMONIA, AMYLASE, LIPASE, LACTATE, CHOL, HDL, LDLCHOLESTEROL, CHOLHDLRATIO, TRIG, VLDL, JAO59ZN, PHENYTOIN, PHENYF, URICACID, POCGLU in the last 72 hours. ABG:  Lab Results   Component Value Date    POCPH 7.366 01/08/2019    POCPCO2 51.0 01/08/2019    POCPO2 65.9 01/08/2019    POCHCO3 29.2 01/08/2019    NBEA NOT REPORTED 01/08/2019    PBEA 3 01/08/2019    TSI6ODK 31 01/08/2019    HAGZ2PHH 92 01/08/2019    FIO2 NOT REPORTED 02/04/2019     Lab Results   Component Value Date/Time    SPECIAL LT ARM 3MLS 08/19/2019 01:51 AM     Lab Results   Component Value Date/Time    CULTURE NO GROWTH 8 HOURS 08/19/2019 01:51 AM       Radiology:  Xr Chest Portable    Result Date: 8/10/2019  No acute cardiopulmonary process. Xr Hip 2-3 Vw W Pelvis Right    Result Date: 8/10/2019  Stable right hip appearance. There is evidence of severe avascular necrosis with collapse of the right femoral head. Arthritic changes present in the right hip, with appearance of the right hip relatively stable since prior study. Microfracture along the articulating surface of the remaining femoral head is noted which could account for the patient's pain. Physical Examination:        General Appearance:  alert, well appearing, and in no acute distress  Mental status: oriented to person, place, and time with normal affect  Head:  normocephalic, atraumatic. Eye: no icterus, redness, conjunctiva clear  Mouth: mucous membranes moist  Lungs: Bilateral equal air entry, clear to ausculation, no wheezing, rales or rhonchi, normal effort  Cardiovascular: normal rate, regular rhythm, no murmur, gallop, rub. Abdomen: Soft, mild tender in the suprapubic area, no rebound, no guarding neurologic: There are no new focal motor or sensory deficits  Skin: No gross lesions, rashes, bruising or bleeding on exposed skin area  Extremities:  peripheral pulses palpable, no pedal edema or calf pain with palpation. CDI dressing overlying right hip.   Psych: normal affect    Assessment:        Hospital Problems           Last Modified POA    * (Principal) Avascular necrosis of bone of right hip ([facility] 8/11/2019 Yes    Essential hypertension 8/11/2019 Yes    Anemia 8/13/2019 Yes    Closed fracture of head of right femur ([facility] 8/11/2019 Yes    COPD without exacerbation ([facility] 8/11/2019 Yes    History of hepatitis C 8/11/2019 Yes    Overview Signed 8/11/2019  2:24 PM by Cely Odell DO     Previously treated at [facility]         Postoperative anemia due to acute blood loss 8/14/2019 Yes    Acute cystitis without hematuria 8/19/2019 Yes          Plan:            1.  Urinalysis positive for UTI, continue levofloxacin and follow-up on sensitivities, repeat lactic acid and give IV fluids  Discussed with RN, orthopedic to be informed to come and evaluate her right hip to rule out any abnormality over there   orthopedics recommended Eliquis, change DVT prophylaxis to Eliquis  2.   Monitor vitals     Case management to work on discharge [id number] Liliana Tirado MD  8/19/2019  12:46 PM

## 2019-08-19 NOTE — PROGRESS NOTES
acetaminophen, traMADol, oxyCODONE **OR** oxyCODONE, sodium chloride flush, albuterol sulfate HFA, benzocaine-menthol, benzonatate, hydrOXYzine, ipratropium-albuterol, melatonin, sodium chloride flush, potassium chloride **OR** potassium alternative oral replacement **OR** potassium chloride, magnesium sulfate, magnesium hydroxide, ondansetron, nicotine     Diagnostics:     CBC:   Recent Labs     08/18/19  0616 08/18/19  1144 08/19/19  0716   WBC  --  6.3 6.8   RBC  --  3.00* 2.90*   HGB 9.0* 8.6* 8.2*   HCT 29.1* 27.8* 28.1*   MCV  --  92.7 96.9   RDW  --  21.9* 23.0*   PLT  --  363 413     BMP:   Recent Labs     08/18/19  1144      K 3.7      CO2 24   BUN 8   CREATININE 0.45*     BNP: No results for input(s): BNP in the last 72 hours. PT/INR: No results for input(s): PROTIME, INR in the last 72 hours. APTT: No results for input(s): APTT in the last 72 hours. CARDIAC ENZYMES: No results for input(s): CKMB, CKMBINDEX, TROPONINT in the last 72 hours. Invalid input(s): CKTOTAL;3  FASTING LIPID PANEL:  Lab Results   Component Value Date    CHOL 119 04/21/2019    HDL 33 (L) 04/21/2019    TRIG 125 04/21/2019     LIVER PROFILE: No results for input(s): AST, ALT, ALB, BILIDIR, BILITOT, ALKPHOS in the last 72 hours. I/O (24Hr): Intake/Output Summary (Last 24 hours) at 8/19/2019 1327  Last data filed at 8/19/2019 1243  Gross per 24 hour   Intake 120 ml   Output 200 ml   Net -80 ml       Glu last 24 hour  No results for input(s): POCGLU in the last 72 hours.     Recent Labs     08/18/19  1507   COLORU DARK YELLOW*   PHUR 6.5   SPECGRAV 1.020   PROTEINU NEGATIVE   RBCUA 0 TO 2   BACTERIA NOT REPORTED   NITRU POSITIVE*   WBCUA 5 TO 10   LEUKOCYTESUR SMALL*   YEAST NOT REPORTED   GLUCOSEU NEGATIVE   BILIRUBINUR MOD*         Impression: Ms. Marlen George is a 75 y.o. right handed female with a history of Avascular necrosis of bone of right hip ([facility]     1. Status post right total hip arthroplasty

## 2019-08-19 NOTE — PROGRESS NOTES
Hypertension, Migraine, Moderate COPD (chronic obstructive pulmonary disease) ([facility]), Multiple transfusions, Osteoarthritis, Pneumonia, Scoliosis, and Substance abuse ([facility] has a past surgical history that includes  section; LEEP (); Upper gastrointestinal endoscopy (); Gastric bypass surgery; Appendectomy (2017); laparoscopic appendectomy (N/A, 3/27/2017); Upper gastrointestinal endoscopy (2018); Upper gastrointestinal endoscopy (N/A, 2018); pr colonoscopy w/biopsy single/multiple (N/A, 11/10/2018); pr egd transoral biopsy single/multiple (N/A, 2018); hip surgery (Right, 2019); and Total hip arthroplasty (Right, 2019). Restrictions  Restrictions/Precautions  Restrictions/Precautions: Weight Bearing  Lower Extremity Weight Bearing Restrictions  Right Lower Extremity Weight Bearing: Weight Bearing As Tolerated  Position Activity Restriction  Other position/activity restrictions: Anterior hip precautions   No SLR's  Subjective   General  Patient assessed for rehabilitation services?: Yes  Family / Caregiver Present: No  Pain Assessment  Pain Assessment: 0-10  Pain Level: 7  Pain Type: Acute pain  Pain Location: Hip  Non-Pharmaceutical Pain Intervention(s): Distraction; Other (Comment)  Response to Pain Intervention: Patient Satisfied  Vital Signs  Patient Currently in Pain: Yes   Orientation  Orientation  Overall Orientation Status: Within Functional Limits  Objective    ADL  Feeding: Setup;Supervision(Laying supine in bed, HOB elevated. Pt requesting assistance in placing straw in cup d/t fatigue, otherwise pt requiring Supervision)  UE Bathing: Minimal assistance  LE Bathing: Moderate assistance  UE Dressing: Contact guard assistance  Additional Comments: Pt refusing to complete bathing ADLs at this time d/t fatigue and agitation. Pt agreeable to demo bathing task, however, stating \"I'm not getting out of bed, I'll show you laying here in bed. \" Pt refusing to get

## 2019-08-19 NOTE — PROGRESS NOTES
laparoscopic appendectomy (N/A, 3/27/2017); Upper gastrointestinal endoscopy (11/05/2018); Upper gastrointestinal endoscopy (N/A, 11/5/2018); pr colonoscopy w/biopsy single/multiple (N/A, 11/10/2018); pr egd transoral biopsy single/multiple (N/A, 11/9/2018); hip surgery (Right, 08/13/2019); and Total hip arthroplasty (Right, 8/13/2019). Restrictions  Restrictions/Precautions  Restrictions/Precautions: Weight Bearing  Lower Extremity Weight Bearing Restrictions  Right Lower Extremity Weight Bearing: Weight Bearing As Tolerated  Position Activity Restriction  Other position/activity restrictions: Anterior hip precautions   No SLR's  Subjective   General  Chart Reviewed: Yes  Response To Previous Treatment: Patient with no complaints from previous session. Family / Caregiver Present: No  Subjective  Subjective: Pt and RN agreeaable to PT. Pt slighlty agitated upon arrival, c/o lack of sleep and pain in R hip. Mod encouragement required for ambulation  General Comment  Comments: Pt left in bed with call light within reach, RN present  Pain Screening  Patient Currently in Pain: Yes  Pain Assessment  Pain Assessment: 0-10  Pain Level: 8  Pain Type: Acute pain  Pain Location: Hip  Pain Orientation: Right  Pain Descriptors: Aching; Sore  Pain Frequency: Continuous  Pain Onset: On-going  Clinical Progression: Not changed  Non-Pharmaceutical Pain Intervention(s): Ambulation/Increased Activity; Therapeutic presence  Vital Signs  Patient Currently in Pain: Yes       Orientation  Orientation  Overall Orientation Status: Within Normal Limits  Cognition      Objective   Bed mobility  Bridging: Contact guard assistance  Scooting: Contact guard assistance  Comment: pt required asistance with progression of R LE to and from the bed  Transfers  Sit to Stand: Contact guard assistance  Stand to sit: Contact guard assistance  Comment: CGA from EOB  Ambulation  Ambulation?: Yes  Ambulation 1  Surface: level tile  Device: Rolling

## 2019-08-20 ENCOUNTER — APPOINTMENT (OUTPATIENT)
Dept: GENERAL RADIOLOGY | Age: 64
DRG: 301 | End: 2019-08-20
Payer: MEDICARE

## 2019-08-20 ENCOUNTER — APPOINTMENT (OUTPATIENT)
Dept: CT IMAGING | Age: 64
DRG: 301 | End: 2019-08-20
Payer: MEDICARE

## 2019-08-20 LAB
ABSOLUTE EOS #: 0 K/UL (ref 0–0.4)
ABSOLUTE IMMATURE GRANULOCYTE: 0.11 K/UL (ref 0–0.3)
ABSOLUTE LYMPH #: 1.53 K/UL (ref 1–4.8)
ABSOLUTE MONO #: 2.29 K/UL (ref 0.1–0.8)
BASOPHILS # BLD: 0 % (ref 0–2)
BASOPHILS ABSOLUTE: 0 K/UL (ref 0–0.2)
DIFFERENTIAL TYPE: ABNORMAL
EOSINOPHILS RELATIVE PERCENT: 0 % (ref 1–4)
HCT VFR BLD CALC: 25.3 % (ref 36.3–47.1)
HEMOGLOBIN: 7.7 G/DL (ref 11.9–15.1)
IMMATURE GRANULOCYTES: 1 %
LYMPHOCYTES # BLD: 14 % (ref 24–44)
MCH RBC QN AUTO: 28.8 PG (ref 25.2–33.5)
MCHC RBC AUTO-ENTMCNC: 30.4 G/DL (ref 28.4–34.8)
MCV RBC AUTO: 94.8 FL (ref 82.6–102.9)
MONOCYTES # BLD: 21 % (ref 1–7)
MORPHOLOGY: ABNORMAL
MORPHOLOGY: ABNORMAL
NRBC AUTOMATED: 0.2 PER 100 WBC
PDW BLD-RTO: 23.9 % (ref 11.8–14.4)
PLATELET # BLD: 446 K/UL (ref 138–453)
PLATELET ESTIMATE: ABNORMAL
PMV BLD AUTO: 10.8 FL (ref 8.1–13.5)
PROCALCITONIN: 6.65 NG/ML
RBC # BLD: 2.67 M/UL (ref 3.95–5.11)
RBC # BLD: ABNORMAL 10*6/UL
SEDIMENTATION RATE, ERYTHROCYTE: 90 MM (ref 0–20)
SEG NEUTROPHILS: 64 % (ref 36–66)
SEGMENTED NEUTROPHILS ABSOLUTE COUNT: 6.97 K/UL (ref 1.8–7.7)
WBC # BLD: 10.9 K/UL (ref 3.5–11.3)
WBC # BLD: ABNORMAL 10*3/UL

## 2019-08-20 PROCEDURE — 85651 RBC SED RATE NONAUTOMATED: CPT

## 2019-08-20 PROCEDURE — 87040 BLOOD CULTURE FOR BACTERIA: CPT

## 2019-08-20 PROCEDURE — 6370000000 HC RX 637 (ALT 250 FOR IP): Performed by: INTERNAL MEDICINE

## 2019-08-20 PROCEDURE — 6360000002 HC RX W HCPCS: Performed by: STUDENT IN AN ORGANIZED HEALTH CARE EDUCATION/TRAINING PROGRAM

## 2019-08-20 PROCEDURE — 6370000000 HC RX 637 (ALT 250 FOR IP): Performed by: STUDENT IN AN ORGANIZED HEALTH CARE EDUCATION/TRAINING PROGRAM

## 2019-08-20 PROCEDURE — 99232 SBSQ HOSP IP/OBS MODERATE 35: CPT | Performed by: INTERNAL MEDICINE

## 2019-08-20 PROCEDURE — 85025 COMPLETE CBC W/AUTO DIFF WBC: CPT

## 2019-08-20 PROCEDURE — 6360000002 HC RX W HCPCS: Performed by: INTERNAL MEDICINE

## 2019-08-20 PROCEDURE — 6370000000 HC RX 637 (ALT 250 FOR IP): Performed by: NURSE PRACTITIONER

## 2019-08-20 PROCEDURE — 93971 EXTREMITY STUDY: CPT

## 2019-08-20 PROCEDURE — 71045 X-RAY EXAM CHEST 1 VIEW: CPT

## 2019-08-20 PROCEDURE — 94640 AIRWAY INHALATION TREATMENT: CPT

## 2019-08-20 PROCEDURE — 84145 PROCALCITONIN (PCT): CPT

## 2019-08-20 PROCEDURE — 2580000003 HC RX 258: Performed by: NURSE PRACTITIONER

## 2019-08-20 PROCEDURE — 36415 COLL VENOUS BLD VENIPUNCTURE: CPT

## 2019-08-20 PROCEDURE — 1200000000 HC SEMI PRIVATE

## 2019-08-20 PROCEDURE — 2580000003 HC RX 258: Performed by: INTERNAL MEDICINE

## 2019-08-20 PROCEDURE — 74176 CT ABD & PELVIS W/O CONTRAST: CPT

## 2019-08-20 RX ORDER — DIPHENHYDRAMINE HCL 25 MG
25 TABLET ORAL EVERY 6 HOURS PRN
Status: DISCONTINUED | OUTPATIENT
Start: 2019-08-20 | End: 2019-08-22 | Stop reason: HOSPADM

## 2019-08-20 RX ADMIN — PANTOPRAZOLE SODIUM 40 MG: 40 TABLET, DELAYED RELEASE ORAL at 16:54

## 2019-08-20 RX ADMIN — DOCUSATE SODIUM 100 MG: 100 CAPSULE, LIQUID FILLED ORAL at 21:58

## 2019-08-20 RX ADMIN — FOLIC ACID 1 MG: 1 TABLET ORAL at 07:51

## 2019-08-20 RX ADMIN — ACETAMINOPHEN 650 MG: 325 TABLET ORAL at 12:38

## 2019-08-20 RX ADMIN — GABAPENTIN 300 MG: 300 CAPSULE ORAL at 07:51

## 2019-08-20 RX ADMIN — MULTIPLE VITAMINS W/ MINERALS TAB 1 TABLET: TAB at 07:51

## 2019-08-20 RX ADMIN — DIPHENHYDRAMINE HCL 25 MG: 25 TABLET ORAL at 12:38

## 2019-08-20 RX ADMIN — APIXABAN 2.5 MG: 2.5 TABLET, FILM COATED ORAL at 21:58

## 2019-08-20 RX ADMIN — OXYCODONE HYDROCHLORIDE 10 MG: 5 TABLET ORAL at 08:00

## 2019-08-20 RX ADMIN — MOMETASONE FUROATE AND FORMOTEROL FUMARATE DIHYDRATE 2 PUFF: 100; 5 AEROSOL RESPIRATORY (INHALATION) at 20:56

## 2019-08-20 RX ADMIN — SODIUM CHLORIDE: 9 INJECTION, SOLUTION INTRAVENOUS at 07:43

## 2019-08-20 RX ADMIN — FERROUS SULFATE TAB EC 325 MG (65 MG FE EQUIVALENT) 325 MG: 325 (65 FE) TABLET DELAYED RESPONSE at 07:51

## 2019-08-20 RX ADMIN — ONDANSETRON 4 MG: 2 INJECTION INTRAMUSCULAR; INTRAVENOUS at 08:27

## 2019-08-20 RX ADMIN — GABAPENTIN 300 MG: 300 CAPSULE ORAL at 21:58

## 2019-08-20 RX ADMIN — OXYCODONE HYDROCHLORIDE 10 MG: 5 TABLET ORAL at 17:00

## 2019-08-20 RX ADMIN — APIXABAN 2.5 MG: 2.5 TABLET, FILM COATED ORAL at 07:52

## 2019-08-20 RX ADMIN — OXYCODONE HYDROCHLORIDE 10 MG: 5 TABLET ORAL at 12:40

## 2019-08-20 RX ADMIN — DIPHENHYDRAMINE HCL 25 MG: 25 TABLET ORAL at 21:58

## 2019-08-20 RX ADMIN — ACETAMINOPHEN 650 MG: 325 TABLET ORAL at 21:58

## 2019-08-20 RX ADMIN — PANTOPRAZOLE SODIUM 40 MG: 40 TABLET, DELAYED RELEASE ORAL at 07:51

## 2019-08-20 RX ADMIN — PIPERACILLIN AND TAZOBACTAM 3.38 G: 3; .375 INJECTION, POWDER, FOR SOLUTION INTRAVENOUS at 18:50

## 2019-08-20 RX ADMIN — Medication 250 MCG: at 07:51

## 2019-08-20 RX ADMIN — OXYCODONE HYDROCHLORIDE 10 MG: 5 TABLET ORAL at 21:58

## 2019-08-20 RX ADMIN — Medication 10 ML: at 07:55

## 2019-08-20 RX ADMIN — CARVEDILOL 6.25 MG: 6.25 TABLET, FILM COATED ORAL at 16:53

## 2019-08-20 RX ADMIN — CARVEDILOL 6.25 MG: 6.25 TABLET, FILM COATED ORAL at 07:52

## 2019-08-20 RX ADMIN — GABAPENTIN 300 MG: 300 CAPSULE ORAL at 16:51

## 2019-08-20 RX ADMIN — LEVOFLOXACIN 500 MG: 500 TABLET, FILM COATED ORAL at 07:51

## 2019-08-20 RX ADMIN — Medication 100 MG: at 07:51

## 2019-08-20 ASSESSMENT — PAIN DESCRIPTION - PAIN TYPE: TYPE: ACUTE PAIN

## 2019-08-20 ASSESSMENT — ENCOUNTER SYMPTOMS
SHORTNESS OF BREATH: 0
ABDOMINAL PAIN: 1
CHEST TIGHTNESS: 0
APNEA: 0
ABDOMINAL DISTENTION: 0
CONSTIPATION: 0
COUGH: 0
VOMITING: 0
ROS SKIN COMMENTS: JAUNDICE
DIARRHEA: 0
NAUSEA: 0
ABDOMINAL PAIN: 0
EYE DISCHARGE: 0

## 2019-08-20 ASSESSMENT — PAIN SCALES - GENERAL
PAINLEVEL_OUTOF10: 2
PAINLEVEL_OUTOF10: 8
PAINLEVEL_OUTOF10: 9
PAINLEVEL_OUTOF10: 7
PAINLEVEL_OUTOF10: 0
PAINLEVEL_OUTOF10: 9

## 2019-08-20 ASSESSMENT — PAIN DESCRIPTION - PROGRESSION

## 2019-08-20 ASSESSMENT — PAIN DESCRIPTION - LOCATION: LOCATION: ABDOMEN;FLANK;LEG

## 2019-08-20 NOTE — PROGRESS NOTES
Case management on for assistance  -Please page ortho with any questions       Annamarie Mejiasr,    Orthopedic Surgery Resident PGY-2  [address] [facility]

## 2019-08-20 NOTE — PROGRESS NOTES
[facility] -   Infectious diseases evaluation  admission date 8/10/2019    reason for consultation:   fever    Impression :   Current:  · Right hip osteonecrosis with fracture  · Post total arthroplasty right hip 8/13/19  · Fever 8/18 and 8/20  · Elevated procalcitonin  · Jaundice    Other:  · Hepatitis C  Discussion / summary of stay / plan of care   · Due to the elevated procalcitonin there is a concern for sepsis, and since the CT of the abdomen is negative would need to check for a deep vein thrombosis. · Jaundice noticed, seems to be new. Need to rule out acute cholecystitis  Recommendations   · Ultrasound of the gallbladder  · Venous Doppler right lower extremity look for DVT  · Stop Levaquin and Flagyl, day 3  · Start Zosyn pending work-up    Infection Control Recommendations   · Universal Precautions  ·     Antimicrobial Stewardship Recommendations   · Simplification of therapy  · Targeted therapy  · Per Kg dosing  ·     Coordination ofOutpatient Care:   · Estimated Length of IV antimicrobials:  · Patient will need Midline / picc Catheter Insertion:   · Patient will need SNF:  · Patient will need outpatient wound care:     History of Present Illness:   Initial history:  Emi Daniels is a 75y.o.-year-old female admitted for the management of right hip pain found to have right avascular necrosis and possible microfractures. Is having a right hip pain was not able to walk, progressive over the last many days, with a known history of avascular necrosis. Had a hip replacement    She developed a fever 8/18, repeated 8/20   Associated to the right abdominal pain right hip pain going down to the thigh  procalci 6-  , urinalysis on 8/18 was showing 5-10 WBCs urine culture and blood cultures are negative. Chest x-ray from 8/20 showed no pneumonia, stable foreign bodies over the supraclavicular region bilaterally. CT abdomen pelvis was negative. 8/20/19.     We are

## 2019-08-20 NOTE — PROGRESS NOTES
hypertension, GERD (gastroesophageal reflux disease), Heart failure, diastolic, with acute decompensation ([facility] Hepatitis C, chronic (HCC), History of smoking at least 1 pack per day for at least 30 years, Hyperlipidemia, Hypertension, Migraine, Moderate COPD (chronic obstructive pulmonary disease) ([facility] Multiple transfusions, Osteoarthritis, Pneumonia, Scoliosis, and Substance abuse ([facility] Social History:   reports that she quit smoking about 9 months ago. Her smoking use included cigarettes. She started smoking about 49 years ago. She has a 40.00 pack-year smoking history. She has never used smokeless tobacco. She reports that she does not drink alcohol or use drugs. Family History:   Family History   Problem Relation Age of Onset    Breast Cancer Mother     Heart Disease Father        Vitals:  /71   Pulse 94   Temp 101.3 °F (38.5 °C) (Oral)   Resp 18   Ht 5' (1.524 m)   Wt 96 lb (43.5 kg)   LMP 2015   SpO2 96%   BMI 18.75 kg/m²   Temp (24hrs), Av.5 °F (37.5 °C), Min:98.3 °F (36.8 °C), Max:101.6 °F (38.7 °C)    No results for input(s): POCGLU in the last 72 hours. I/O (24Hr):     Intake/Output Summary (Last 24 hours) at 2019 1222  Last data filed at 2019 1115  Gross per 24 hour   Intake --   Output 1250 ml   Net -1250 ml       Labs:  Hematology:  Recent Labs     19  1144 19  0716 19  1003   WBC 6.3 6.8 10.9   RBC 3.00* 2.90* 2.67*   HGB 8.6* 8.2* 7.7*   HCT 27.8* 28.1* 25.3*   MCV 92.7 96.9 94.8   MCH 28.7 28.3 28.8   MCHC 30.9 29.2 30.4   RDW 21.9* 23.0* 23.9*    413 446   MPV 10.7 10.8 10.8     Chemistry:  Recent Labs     19  1144 19  1727     --    K 3.7  --      --    CO2 24  --    GLUCOSE 155*  --    BUN 8  --    CREATININE 0.45*  --    ANIONGAP 13  --    LABGLOM >60  --    GFRAA >60  --    CALCIUM 8.1*  --    LACTACIDWB  --  2.1     No results for input(s): PROT, LABALBU, LABA1C, O6IEQJX, C1GGMOB, FT4, TSH, AST, ALT, LDH, GGT, ALKPHOS, LABGGT, BILITOT, BILIDIR, AMMONIA, AMYLASE, LIPASE, LACTATE, CHOL, HDL, LDLCHOLESTEROL, CHOLHDLRATIO, TRIG, VLDL, XWS42UH, PHENYTOIN, PHENYF, URICACID, POCGLU in the last 72 hours. ABG:  Lab Results   Component Value Date    POCPH 7.366 01/08/2019    POCPCO2 51.0 01/08/2019    POCPO2 65.9 01/08/2019    POCHCO3 29.2 01/08/2019    NBEA NOT REPORTED 01/08/2019    PBEA 3 01/08/2019    QTY2HWA 31 01/08/2019    PEWS0TEW 92 01/08/2019    FIO2 NOT REPORTED 02/04/2019     Lab Results   Component Value Date/Time    SPECIAL LWRIST 1ML 08/20/2019 10:20 AM     Lab Results   Component Value Date/Time    CULTURE NO GROWTH 1 HOUR 08/20/2019 10:20 AM       Radiology:  Xr Chest Portable    Result Date: 8/10/2019  No acute cardiopulmonary process. Xr Hip 2-3 Vw W Pelvis Right    Result Date: 8/10/2019  Stable right hip appearance. There is evidence of severe avascular necrosis with collapse of the right femoral head. Arthritic changes present in the right hip, with appearance of the right hip relatively stable since prior study. Microfracture along the articulating surface of the remaining femoral head is noted which could account for the patient's pain. Physical Examination:        General Appearance:  alert, well appearing, and in no acute distress  Mental status: oriented to person, place, and time with normal affect  Head:  normocephalic, atraumatic. Eye: no icterus, redness, conjunctiva clear  Mouth: mucous membranes moist  Lungs: Bilateral equal air entry, clear to ausculation, no wheezing, rales or rhonchi, normal effort  Cardiovascular: normal rate, regular rhythm, no murmur, gallop, rub. Abdomen: Soft, mild tender in the suprapubic area, no rebound, no guarding neurologic: There are no new focal motor or sensory deficits  Skin: No gross lesions, rashes, bruising or bleeding on exposed skin area  Extremities:  peripheral pulses palpable, no pedal edema or calf pain with palpation.  CDI dressing overlying right hip. Psych: normal affect    Assessment:        Hospital Problems           Last Modified POA    * (Principal) Avascular necrosis of bone of right hip ([facility] 8/11/2019 Yes    Essential hypertension 8/11/2019 Yes    Anemia 8/13/2019 Yes    Closed fracture of head of right femur ([facility] 8/11/2019 Yes    COPD without exacerbation ([facility] 8/11/2019 Yes    History of hepatitis C 8/11/2019 Yes    Overview Signed 8/11/2019  2:24 PM by Scot Khan DO     Previously treated at [facility]         Postoperative anemia due to acute blood loss 8/14/2019 Yes    Acute cystitis without hematuria 8/19/2019 Yes          Plan:            1. Patient is having fever with right-sided pain, we will check CT of the abdomen, check CBC, procalcitonin, cultures and urine culture again,   2. continue Levaquin for now   3. discussed with RN, orthopedic to be informed to come and evaluate her right hip to rule out any abnormality over there  4. ID evaluation  5. Check ESR and CRP  6.  Anemia most likely anemia of acute blood loss postoperative  Elie Jefferson MD  8/20/2019  12:22 PM

## 2019-08-21 ENCOUNTER — APPOINTMENT (OUTPATIENT)
Dept: ULTRASOUND IMAGING | Age: 64
DRG: 301 | End: 2019-08-21
Payer: MEDICARE

## 2019-08-21 LAB
ALBUMIN SERPL-MCNC: 2.5 G/DL (ref 3.5–5.2)
ALBUMIN/GLOBULIN RATIO: 0.7 (ref 1–2.5)
ALP BLD-CCNC: 154 U/L (ref 35–104)
ALT SERPL-CCNC: 67 U/L (ref 5–33)
AST SERPL-CCNC: 129 U/L
BILIRUB SERPL-MCNC: 1.47 MG/DL (ref 0.3–1.2)
BILIRUBIN DIRECT: 0.92 MG/DL
BILIRUBIN, INDIRECT: 0.55 MG/DL (ref 0–1)
C-REACTIVE PROTEIN: 46.1 MG/L (ref 0–5)
C-REACTIVE PROTEIN: 54.9 MG/L (ref 0–5)
GLOBULIN: ABNORMAL G/DL (ref 1.5–3.8)
HCT VFR BLD CALC: 23.6 % (ref 36.3–47.1)
HCT VFR BLD CALC: 24.8 % (ref 36.3–47.1)
HEMOGLOBIN: 7.1 G/DL (ref 11.9–15.1)
HEMOGLOBIN: 7.4 G/DL (ref 11.9–15.1)
INTERVENTION: NORMAL
TOTAL PROTEIN: 6.1 G/DL (ref 6.4–8.3)

## 2019-08-21 PROCEDURE — 6370000000 HC RX 637 (ALT 250 FOR IP): Performed by: INTERNAL MEDICINE

## 2019-08-21 PROCEDURE — 6370000000 HC RX 637 (ALT 250 FOR IP): Performed by: STUDENT IN AN ORGANIZED HEALTH CARE EDUCATION/TRAINING PROGRAM

## 2019-08-21 PROCEDURE — 6360000002 HC RX W HCPCS: Performed by: INTERNAL MEDICINE

## 2019-08-21 PROCEDURE — 87086 URINE CULTURE/COLONY COUNT: CPT

## 2019-08-21 PROCEDURE — 94640 AIRWAY INHALATION TREATMENT: CPT

## 2019-08-21 PROCEDURE — 1200000000 HC SEMI PRIVATE

## 2019-08-21 PROCEDURE — 80076 HEPATIC FUNCTION PANEL: CPT

## 2019-08-21 PROCEDURE — 76705 ECHO EXAM OF ABDOMEN: CPT

## 2019-08-21 PROCEDURE — 85014 HEMATOCRIT: CPT

## 2019-08-21 PROCEDURE — 6370000000 HC RX 637 (ALT 250 FOR IP): Performed by: NURSE PRACTITIONER

## 2019-08-21 PROCEDURE — 99232 SBSQ HOSP IP/OBS MODERATE 35: CPT | Performed by: INTERNAL MEDICINE

## 2019-08-21 PROCEDURE — 36415 COLL VENOUS BLD VENIPUNCTURE: CPT

## 2019-08-21 PROCEDURE — 2580000003 HC RX 258: Performed by: INTERNAL MEDICINE

## 2019-08-21 PROCEDURE — 85018 HEMOGLOBIN: CPT

## 2019-08-21 PROCEDURE — 86140 C-REACTIVE PROTEIN: CPT

## 2019-08-21 RX ORDER — POLYETHYLENE GLYCOL 3350 17 G/17G
17 POWDER, FOR SOLUTION ORAL DAILY
Status: DISCONTINUED | OUTPATIENT
Start: 2019-08-21 | End: 2019-08-22 | Stop reason: HOSPADM

## 2019-08-21 RX ORDER — PIPERACILLIN SODIUM, TAZOBACTAM SODIUM 3; .375 G/15ML; G/15ML
3.38 INJECTION, POWDER, LYOPHILIZED, FOR SOLUTION INTRAVENOUS EVERY 8 HOURS
Qty: 40.5 G | Refills: 0
Start: 2019-08-21 | End: 2019-08-25

## 2019-08-21 RX ORDER — BISACODYL 10 MG
10 SUPPOSITORY, RECTAL RECTAL ONCE
Status: DISCONTINUED | OUTPATIENT
Start: 2019-08-21 | End: 2019-08-22 | Stop reason: HOSPADM

## 2019-08-21 RX ORDER — BISACODYL 10 MG
10 SUPPOSITORY, RECTAL RECTAL DAILY PRN
Status: DISCONTINUED | OUTPATIENT
Start: 2019-08-21 | End: 2019-08-21

## 2019-08-21 RX ADMIN — MOMETASONE FUROATE AND FORMOTEROL FUMARATE DIHYDRATE 2 PUFF: 100; 5 AEROSOL RESPIRATORY (INHALATION) at 20:05

## 2019-08-21 RX ADMIN — PANTOPRAZOLE SODIUM 40 MG: 40 TABLET, DELAYED RELEASE ORAL at 16:09

## 2019-08-21 RX ADMIN — PIPERACILLIN AND TAZOBACTAM 3.38 G: 3; .375 INJECTION, POWDER, FOR SOLUTION INTRAVENOUS at 03:51

## 2019-08-21 RX ADMIN — DOCUSATE SODIUM 100 MG: 100 CAPSULE, LIQUID FILLED ORAL at 21:51

## 2019-08-21 RX ADMIN — FOLIC ACID 1 MG: 1 TABLET ORAL at 09:11

## 2019-08-21 RX ADMIN — GABAPENTIN 300 MG: 300 CAPSULE ORAL at 14:00

## 2019-08-21 RX ADMIN — APIXABAN 2.5 MG: 2.5 TABLET, FILM COATED ORAL at 21:51

## 2019-08-21 RX ADMIN — GABAPENTIN 300 MG: 300 CAPSULE ORAL at 21:51

## 2019-08-21 RX ADMIN — PIPERACILLIN AND TAZOBACTAM 3.38 G: 3; .375 INJECTION, POWDER, FOR SOLUTION INTRAVENOUS at 12:24

## 2019-08-21 RX ADMIN — CARVEDILOL 6.25 MG: 6.25 TABLET, FILM COATED ORAL at 16:09

## 2019-08-21 RX ADMIN — OXYCODONE HYDROCHLORIDE 10 MG: 5 TABLET ORAL at 14:00

## 2019-08-21 RX ADMIN — MOMETASONE FUROATE AND FORMOTEROL FUMARATE DIHYDRATE 2 PUFF: 100; 5 AEROSOL RESPIRATORY (INHALATION) at 09:15

## 2019-08-21 RX ADMIN — OXYCODONE HYDROCHLORIDE 10 MG: 5 TABLET ORAL at 05:19

## 2019-08-21 RX ADMIN — PANTOPRAZOLE SODIUM 40 MG: 40 TABLET, DELAYED RELEASE ORAL at 09:11

## 2019-08-21 RX ADMIN — POLYETHYLENE GLYCOL 3350 17 G: 17 POWDER, FOR SOLUTION ORAL at 12:22

## 2019-08-21 RX ADMIN — APIXABAN 2.5 MG: 2.5 TABLET, FILM COATED ORAL at 09:11

## 2019-08-21 RX ADMIN — OXYCODONE HYDROCHLORIDE 10 MG: 5 TABLET ORAL at 21:54

## 2019-08-21 RX ADMIN — Medication 250 MCG: at 09:10

## 2019-08-21 RX ADMIN — FERROUS SULFATE TAB EC 325 MG (65 MG FE EQUIVALENT) 325 MG: 325 (65 FE) TABLET DELAYED RESPONSE at 09:11

## 2019-08-21 RX ADMIN — OXYCODONE HYDROCHLORIDE 10 MG: 5 TABLET ORAL at 09:10

## 2019-08-21 RX ADMIN — GABAPENTIN 300 MG: 300 CAPSULE ORAL at 09:11

## 2019-08-21 RX ADMIN — OXYCODONE HYDROCHLORIDE 10 MG: 5 TABLET ORAL at 18:07

## 2019-08-21 RX ADMIN — DOCUSATE SODIUM 100 MG: 100 CAPSULE, LIQUID FILLED ORAL at 09:11

## 2019-08-21 RX ADMIN — Medication 100 MG: at 09:10

## 2019-08-21 RX ADMIN — CARVEDILOL 6.25 MG: 6.25 TABLET, FILM COATED ORAL at 09:11

## 2019-08-21 RX ADMIN — PIPERACILLIN AND TAZOBACTAM 3.38 G: 3; .375 INJECTION, POWDER, FOR SOLUTION INTRAVENOUS at 21:52

## 2019-08-21 ASSESSMENT — ENCOUNTER SYMPTOMS
CONSTIPATION: 0
DIARRHEA: 0
ABDOMINAL PAIN: 1
FACIAL SWELLING: 0
ABDOMINAL DISTENTION: 0
EYE ITCHING: 0
COUGH: 0
SHORTNESS OF BREATH: 0
CONSTIPATION: 1
APNEA: 0
EYE DISCHARGE: 0
EYE PAIN: 0
SORE THROAT: 0
ABDOMINAL PAIN: 0
CHEST TIGHTNESS: 0
COLOR CHANGE: 0
ROS SKIN COMMENTS: JAUNDICE
NAUSEA: 0
VOMITING: 0

## 2019-08-21 ASSESSMENT — PAIN DESCRIPTION - FREQUENCY
FREQUENCY: INTERMITTENT
FREQUENCY: INTERMITTENT

## 2019-08-21 ASSESSMENT — PAIN DESCRIPTION - PAIN TYPE
TYPE: SURGICAL PAIN
TYPE: SURGICAL PAIN

## 2019-08-21 ASSESSMENT — PAIN DESCRIPTION - ORIENTATION
ORIENTATION: RIGHT
ORIENTATION: RIGHT

## 2019-08-21 ASSESSMENT — PAIN SCALES - GENERAL
PAINLEVEL_OUTOF10: 8
PAINLEVEL_OUTOF10: 7
PAINLEVEL_OUTOF10: 7
PAINLEVEL_OUTOF10: 8
PAINLEVEL_OUTOF10: 8
PAINLEVEL_OUTOF10: 9
PAINLEVEL_OUTOF10: 8

## 2019-08-21 ASSESSMENT — PAIN DESCRIPTION - LOCATION
LOCATION: HIP
LOCATION: HIP

## 2019-08-21 ASSESSMENT — PAIN DESCRIPTION - DESCRIPTORS
DESCRIPTORS: ACHING
DESCRIPTORS: ACHING

## 2019-08-21 ASSESSMENT — PAIN - FUNCTIONAL ASSESSMENT: PAIN_FUNCTIONAL_ASSESSMENT: PREVENTS OR INTERFERES SOME ACTIVE ACTIVITIES AND ADLS

## 2019-08-21 ASSESSMENT — PAIN DESCRIPTION - PROGRESSION
CLINICAL_PROGRESSION: NOT CHANGED

## 2019-08-21 ASSESSMENT — PAIN DESCRIPTION - ONSET: ONSET: ON-GOING

## 2019-08-21 NOTE — PROGRESS NOTES
breath. Gastrointestinal: Negative for abdominal distention, abdominal pain, constipation, diarrhea, nausea and vomiting. Genitourinary: Negative for dysuria and hematuria. Musculoskeletal: Positive for arthralgias. Neurological: Negative for dizziness, weakness, light-headedness, numbness and headaches. Medications: Allergies:     Allergies   Allergen Reactions    Aspirin Itching    Keflet [Cephalexin]      Tolerated penicillin V 12/13, tolerates cephalexin 6/14       Current Meds:   Scheduled Meds:    polyethylene glycol  17 g Oral Daily    piperacillin-tazobactam  3.375 g Intravenous Q8H    sodium chloride flush  10 mL Intravenous 2 times per day    docusate sodium  100 mg Oral BID    apixaban  2.5 mg Oral BID    lidocaine  1 patch Transdermal Daily    SUMAtriptan  50 mg Oral Once    carvedilol  6.25 mg Oral BID WC    ferrous sulfate  325 mg Oral Daily    folic acid  1 mg Oral Daily    gabapentin  300 mg Oral TID    guaiFENesin  600 mg Oral BID    mometasone-formoterol  2 puff Inhalation BID    therapeutic multivitamin-minerals  1 tablet Oral Daily    pantoprazole  40 mg Oral BID AC    thiamine  100 mg Oral Daily    cyanocobalamin  250 mcg Oral Daily     Continuous Infusions:    sodium chloride 75 mL/hr at 08/20/19 0743     PRN Meds: diphenhydrAMINE, sodium chloride flush, acetaminophen, traMADol, oxyCODONE **OR** oxyCODONE, sodium chloride flush, albuterol sulfate HFA, benzocaine-menthol, benzonatate, hydrOXYzine, ipratropium-albuterol, melatonin, sodium chloride flush, potassium chloride **OR** potassium alternative oral replacement **OR** potassium chloride, magnesium sulfate, magnesium hydroxide, ondansetron, nicotine    Data:     Past Medical History:   has a past medical history of Appendicitis, CHF (congestive heart failure) ([facility]), Chronic respiratory failure with hypoxia and hypercapnia ([facility]), Chronic rhinitis, Cigarette smoker, COPD (chronic obstructive pulmonary disease) ([facility] Depression, Dysphagia, Essential hypertension, GERD (gastroesophageal reflux disease), Heart failure, diastolic, with acute decompensation ([facility] Hepatitis C, chronic ([facility] History of smoking at least 1 pack per day for at least 30 years, Hyperlipidemia, Hypertension, Migraine, Moderate COPD (chronic obstructive pulmonary disease) ([facility] Multiple transfusions, Osteoarthritis, Pneumonia, Scoliosis, and Substance abuse ([facility] Social History:   reports that she quit smoking about 9 months ago. Her smoking use included cigarettes. She started smoking about 49 years ago. She has a 40.00 pack-year smoking history. She has never used smokeless tobacco. She reports that she does not drink alcohol or use drugs. Family History:   Family History   Problem Relation Age of Onset    Breast Cancer Mother     Heart Disease Father        Vitals:  /67   Pulse 87   Temp 98 °F (36.7 °C) (Oral)   Resp 16   Ht 5' (1.524 m)   Wt 96 lb (43.5 kg)   LMP 2015   SpO2 94%   BMI 18.75 kg/m²   Temp (24hrs), Av.3 °F (37.4 °C), Min:98 °F (36.7 °C), Max:100.3 °F (37.9 °C)    No results for input(s): POCGLU in the last 72 hours. I/O (24Hr):     Intake/Output Summary (Last 24 hours) at 2019 1228  Last data filed at 2019 0147  Gross per 24 hour   Intake 900 ml   Output 450 ml   Net 450 ml       Labs:  Hematology:  Recent Labs     19  0716 19  1003 19  0541   WBC 6.8 10.9  --    RBC 2.90* 2.67*  --    HGB 8.2* 7.7* 7.1*   HCT 28.1* 25.3* 23.6*   MCV 96.9 94.8  --    MCH 28.3 28.8  --    MCHC 29.2 30.4  --    RDW 23.0* 23.9*  --     446  --    MPV 10.8 10.8  --    SEDRATE  --  90*  --    CRP  --   --  54.9*     Chemistry:  Recent Labs     19  1727   LACTACIDWB 2.1     No results for input(s): PROT, LABALBU, LABA1C, V7LLICO, B0XAKIE, FT4, TSH, AST, ALT, LDH, GGT, ALKPHOS, LABGGT, BILITOT, BILIDIR, AMMONIA, AMYLASE, LIPASE, LACTATE, CHOL, HDL, LDLCHOLESTEROL, Modified POA    * (Principal) Avascular necrosis of bone of right hip ([address] 8/11/2019 Yes    Essential hypertension 8/11/2019 Yes    Anemia 8/13/2019 Yes    Closed fracture of head of right femur ([address] 8/11/2019 Yes    COPD without exacerbation ([address] 8/11/2019 Yes    History of hepatitis C 8/11/2019 Yes    Overview Signed 8/11/2019  2:24 PM by Jayme Henry DO     Previously treated at [facility]         Postoperative anemia due to acute blood loss 8/14/2019 Yes    Acute cystitis without hematuria 8/19/2019 Yes    Avascular necrosis ([address] 8/20/2019 Yes    Fever and chills 8/20/2019 Yes    Elevated procalcitonin 8/20/2019 Yes          Plan:            1. Patient is having fever with right-sided pain, CT of the abdomen unremarkable, she does have elevated procalcitonin with fever, appreciate ID input, started on Zosyn  2. Continues to have pain and tenderness, we will get general surgery input  3. We will ask for constipation  4. Venous ultrasound and gallbladder ultrasound negative  5.  Anemia most likely anemia of acute blood loss postoperative, repeat hemoglobin  Burke Espinosa MD  8/21/2019  12:28 PM

## 2019-08-21 NOTE — PLAN OF CARE
Patient remained free from falls this shift. Bed locked and in lowest position, call light within reach, and bed alarm on. No new skin breakdown during this shift. Will continue to monitor. Patients acute pain controlled this shift.  See Mar.

## 2019-08-21 NOTE — CONSULTS
Consult - General Surgery    PATIENT NAME: Emi Daniels  AGE: 75 y.o. MEDICAL RECORD NO. [id number]  DATE: 8/21/2019  SURGEON: Dr. Cherylene [place]: No primary care provider on file. Patient evaluated at the request of  Dr. Dino Aguayo  Reason for evaluation: RLQ pain    Patient information was obtained from patient. History/Exam limitations: none. IMPRESSION:     Patient Active Problem List   Diagnosis    Essential hypertension    Hepatitis C    History of substance abuse    Chronic heart failure with reduced ejection fraction and diastolic dysfunction (HCC)    COPD (chronic obstructive pulmonary disease) (HCC)    Alcoholic cirrhosis of liver without ascites ([address]    PUD (peptic ulcer disease)    Anemia    Duodenal ulcer with hemorrhage    Psychosis, intermittent requring antipsychotic meds    Opiate withdrawal ([address]    Severe sepsis ([address]    Pancreas head lesion    Bacteremia    Polysubstance abuse ([address]    Cerebrovascular accident (CVA) ([address]    Stroke-like symptoms    Chest pain    Abdominal pain    Syncope and collapse    Avascular necrosis of bone of right hip (HCC)    Closed fracture of head of right femur (HCC)    COPD without exacerbation ([address]    History of hepatitis C    Postoperative anemia due to acute blood loss    Acute cystitis without hematuria    Avascular necrosis (HCC)    Fever and chills    Elevated procalcitonin   1. MEDICAL DECISION MAKING AND PLAN:   1. Abdominal pain secondary to chronic constipation  -will add bowel regimen of BID colace, miralax, and a one time dulcolax suppository that may be repeated as necessary  -imaging negative for intraabdominal process, heavy stool burden noted on CT abdomen and pelvis  -encourage increased water intake and counseled importance of fiber in her diet  2. Urinary Tract Infection  -zosyn started by infectious disease, on day 2  3.  No surgical intervention indicated at this time, recommend increased !          ! +------------------------------------+----------+---------------+----------+ ! Popliteal                           !Yes       ! Yes            ! None      ! +------------------------------------+----------+---------------+----------+ ! Sapheno Femoral Junction            ! Yes       ! Yes            ! None      ! +------------------------------------+----------+---------------+----------+ ! PTV                                 ! Yes       ! Yes            ! None      ! +------------------------------------+----------+---------------+----------+ ! Peroneal                            !Yes       ! Yes            ! None      ! +------------------------------------+----------+---------------+----------+ ! Gastroc                             ! Yes       ! Yes            ! None      ! +------------------------------------+----------+---------------+----------+ ! GSV Thigh                           ! Yes       ! Yes            ! None      ! +------------------------------------+----------+---------------+----------+ ! GSV Knee                            ! Yes       ! Yes            ! None      ! +------------------------------------+----------+---------------+----------+ ! GSV Ankle                           ! Yes       ! Yes            ! None      ! +------------------------------------+----------+---------------+----------+ ! SSV                                 ! Yes       ! Yes            ! None      ! +------------------------------------+----------+---------------+----------+ Right Doppler Measurements +---------------------------+------+------+--------------------------------+ ! Location                   ! Signal!Reflux! Reflux (msec)                   ! +---------------------------+------+------+--------------------------------+ ! Common Femoral             !Phasic!      !                                ! +---------------------------+------+------+--------------------------------+ ! Prox Femoral               !Phasic!      ! ! +---------------------------+------+------+--------------------------------+ ! Popliteal                  !Phasic!      !                                ! +---------------------------+------+------+--------------------------------+ Left Lower Extremities DVT Study Measurements Left Doppler Measurements +----------------------------+------+------+-------------------------------+ ! Location                    ! Signal!Reflux! Reflux (msec)                  ! +----------------------------+------+------+-------------------------------+ ! Common Femoral              !Phasic!      !                               ! +----------------------------+------+------+-------------------------------+    Thank you for the interesting evaluation. Further recommendations to follow.     Gaby Ron DO  8/21/19, 5:21 PM

## 2019-08-21 NOTE — PROGRESS NOTES
 SUMAtriptan  50 mg Oral Once    carvedilol  6.25 mg Oral BID WC    ferrous sulfate  325 mg Oral Daily    folic acid  1 mg Oral Daily    gabapentin  300 mg Oral TID    guaiFENesin  600 mg Oral BID    mometasone-formoterol  2 puff Inhalation BID    therapeutic multivitamin-minerals  1 tablet Oral Daily    pantoprazole  40 mg Oral BID AC    thiamine  100 mg Oral Daily    cyanocobalamin  250 mcg Oral Daily       Social History:     Social History     Socioeconomic History    Marital status: Single     Spouse name: Not on file    Number of children: Not on file    Years of education: Not on file    Highest education level: Not on file   Occupational History    Not on file   Social Needs    Financial resource strain: Not on file    Food insecurity:     Worry: Not on file     Inability: Not on file    Transportation needs:     Medical: Not on file     Non-medical: Not on file   Tobacco Use    Smoking status: Former Smoker     Packs/day: 1.00     Years: 40.00     Pack years: 40.00     Types: Cigarettes     Start date:      Last attempt to quit: 2018     Years since quittin.8    Smokeless tobacco: Never Used   Substance and Sexual Activity    Alcohol use: No     Alcohol/week: 0.0 standard drinks    Drug use: No     Types: IV, Cocaine     Comment: past history    Sexual activity: Not on file   Lifestyle    Physical activity:     Days per week: Not on file     Minutes per session: Not on file    Stress: Not on file   Relationships    Social connections:     Talks on phone: Not on file     Gets together: Not on file     Attends L[employer] service: Not on file     Active member of club or organization: Not on file     Attends meetings of clubs or organizations: Not on file     Relationship status: Not on file    Intimate partner violence:     Fear of current or ex partner: Not on file     Emotionally abused: Not on file     Physically abused: Not on file     Forced sexual activity: Not on

## 2019-08-22 ENCOUNTER — APPOINTMENT (OUTPATIENT)
Dept: CT IMAGING | Age: 64
DRG: 301 | End: 2019-08-22
Payer: MEDICARE

## 2019-08-22 VITALS
WEIGHT: 107.14 LBS | DIASTOLIC BLOOD PRESSURE: 89 MMHG | TEMPERATURE: 99.6 F | HEIGHT: 60 IN | RESPIRATION RATE: 15 BRPM | OXYGEN SATURATION: 100 % | BODY MASS INDEX: 21.04 KG/M2 | HEART RATE: 92 BPM | SYSTOLIC BLOOD PRESSURE: 139 MMHG

## 2019-08-22 LAB
ABSOLUTE EOS #: 0.1 K/UL (ref 0–0.44)
ABSOLUTE IMMATURE GRANULOCYTE: 0.1 K/UL (ref 0–0.3)
ABSOLUTE LYMPH #: 1.73 K/UL (ref 1.1–3.7)
ABSOLUTE MONO #: 2.35 K/UL (ref 0.1–1.2)
ALBUMIN SERPL-MCNC: 2.6 G/DL (ref 3.5–5.2)
ALBUMIN/GLOBULIN RATIO: 0.7 (ref 1–2.5)
ALP BLD-CCNC: 148 U/L (ref 35–104)
ALT SERPL-CCNC: 59 U/L (ref 5–33)
ANION GAP SERPL CALCULATED.3IONS-SCNC: 12 MMOL/L (ref 9–17)
AST SERPL-CCNC: 85 U/L
BASOPHILS # BLD: 0 % (ref 0–2)
BASOPHILS ABSOLUTE: 0 K/UL (ref 0–0.2)
BILIRUB SERPL-MCNC: 1.62 MG/DL (ref 0.3–1.2)
BILIRUBIN DIRECT: 1.01 MG/DL
BILIRUBIN, INDIRECT: 0.61 MG/DL (ref 0–1)
BUN BLDV-MCNC: 4 MG/DL (ref 8–23)
CALCIUM SERPL-MCNC: 8.5 MG/DL (ref 8.6–10.4)
CHLORIDE BLD-SCNC: 105 MMOL/L (ref 98–107)
CO2: 23 MMOL/L (ref 20–31)
CREAT SERPL-MCNC: 0.46 MG/DL (ref 0.5–0.9)
CULTURE: NO GROWTH
DIFFERENTIAL TYPE: ABNORMAL
EOSINOPHILS RELATIVE PERCENT: 1 % (ref 1–4)
GFR AFRICAN AMERICAN: >60 ML/MIN
GFR NON-AFRICAN AMERICAN: >60 ML/MIN
GFR SERPL CREATININE-BSD FRML MDRD: ABNORMAL ML/MIN/{1.73_M2}
GFR SERPL CREATININE-BSD FRML MDRD: ABNORMAL ML/MIN/{1.73_M2}
GLUCOSE BLD-MCNC: 97 MG/DL (ref 70–99)
HCT VFR BLD CALC: 26 % (ref 36.3–47.1)
HEMOGLOBIN: 7.9 G/DL (ref 11.9–15.1)
IMMATURE GRANULOCYTES: 1 %
LYMPHOCYTES # BLD: 17 % (ref 24–43)
Lab: NORMAL
MCH RBC QN AUTO: 28.4 PG (ref 25.2–33.5)
MCHC RBC AUTO-ENTMCNC: 30.4 G/DL (ref 28.4–34.8)
MCV RBC AUTO: 93.5 FL (ref 82.6–102.9)
MONOCYTES # BLD: 23 % (ref 3–12)
MORPHOLOGY: ABNORMAL
NRBC AUTOMATED: 0.2 PER 100 WBC
PDW BLD-RTO: 23.9 % (ref 11.8–14.4)
PLATELET # BLD: 592 K/UL (ref 138–453)
PLATELET ESTIMATE: ABNORMAL
PMV BLD AUTO: 10.6 FL (ref 8.1–13.5)
POTASSIUM SERPL-SCNC: 3.9 MMOL/L (ref 3.7–5.3)
RBC # BLD: 2.78 M/UL (ref 3.95–5.11)
RBC # BLD: ABNORMAL 10*6/UL
SEG NEUTROPHILS: 58 % (ref 36–65)
SEGMENTED NEUTROPHILS ABSOLUTE COUNT: 5.92 K/UL (ref 1.5–8.1)
SODIUM BLD-SCNC: 140 MMOL/L (ref 135–144)
SPECIMEN DESCRIPTION: NORMAL
TOTAL PROTEIN: 6.2 G/DL (ref 6.4–8.3)
WBC # BLD: 10.2 K/UL (ref 3.5–11.3)
WBC # BLD: ABNORMAL 10*3/UL

## 2019-08-22 PROCEDURE — C1751 CATH, INF, PER/CENT/MIDLINE: HCPCS

## 2019-08-22 PROCEDURE — 74177 CT ABD & PELVIS W/CONTRAST: CPT

## 2019-08-22 PROCEDURE — 6360000002 HC RX W HCPCS: Performed by: STUDENT IN AN ORGANIZED HEALTH CARE EDUCATION/TRAINING PROGRAM

## 2019-08-22 PROCEDURE — 6360000002 HC RX W HCPCS: Performed by: INTERNAL MEDICINE

## 2019-08-22 PROCEDURE — 6370000000 HC RX 637 (ALT 250 FOR IP): Performed by: NURSE PRACTITIONER

## 2019-08-22 PROCEDURE — 76937 US GUIDE VASCULAR ACCESS: CPT

## 2019-08-22 PROCEDURE — 6370000000 HC RX 637 (ALT 250 FOR IP): Performed by: STUDENT IN AN ORGANIZED HEALTH CARE EDUCATION/TRAINING PROGRAM

## 2019-08-22 PROCEDURE — 85025 COMPLETE CBC W/AUTO DIFF WBC: CPT

## 2019-08-22 PROCEDURE — 82248 BILIRUBIN DIRECT: CPT

## 2019-08-22 PROCEDURE — 2580000003 HC RX 258: Performed by: INTERNAL MEDICINE

## 2019-08-22 PROCEDURE — 99239 HOSP IP/OBS DSCHRG MGMT >30: CPT | Performed by: INTERNAL MEDICINE

## 2019-08-22 PROCEDURE — 6370000000 HC RX 637 (ALT 250 FOR IP): Performed by: INTERNAL MEDICINE

## 2019-08-22 PROCEDURE — 36415 COLL VENOUS BLD VENIPUNCTURE: CPT

## 2019-08-22 PROCEDURE — 80053 COMPREHEN METABOLIC PANEL: CPT

## 2019-08-22 PROCEDURE — 6360000004 HC RX CONTRAST MEDICATION: Performed by: INTERNAL MEDICINE

## 2019-08-22 PROCEDURE — 94640 AIRWAY INHALATION TREATMENT: CPT

## 2019-08-22 PROCEDURE — 97110 THERAPEUTIC EXERCISES: CPT

## 2019-08-22 RX ORDER — POLYETHYLENE GLYCOL 3350 17 G/17G
17 POWDER, FOR SOLUTION ORAL DAILY PRN
DISCHARGE
Start: 2019-08-22 | End: 2019-09-21

## 2019-08-22 RX ORDER — PSEUDOEPHEDRINE HCL 30 MG
100 TABLET ORAL 2 TIMES DAILY
Status: ON HOLD | DISCHARGE
Start: 2019-08-22 | End: 2020-08-02 | Stop reason: HOSPADM

## 2019-08-22 RX ORDER — OXYCODONE HYDROCHLORIDE 5 MG/1
5 TABLET ORAL EVERY 6 HOURS PRN
Qty: 10 TABLET | Refills: 0 | Status: SHIPPED | OUTPATIENT
Start: 2019-08-22 | End: 2019-08-27

## 2019-08-22 RX ADMIN — MOMETASONE FUROATE AND FORMOTEROL FUMARATE DIHYDRATE 2 PUFF: 100; 5 AEROSOL RESPIRATORY (INHALATION) at 07:46

## 2019-08-22 RX ADMIN — PANTOPRAZOLE SODIUM 40 MG: 40 TABLET, DELAYED RELEASE ORAL at 08:13

## 2019-08-22 RX ADMIN — IOVERSOL 75 ML: 741 INJECTION INTRA-ARTERIAL; INTRAVENOUS at 11:12

## 2019-08-22 RX ADMIN — PANTOPRAZOLE SODIUM 40 MG: 40 TABLET, DELAYED RELEASE ORAL at 15:20

## 2019-08-22 RX ADMIN — OXYCODONE HYDROCHLORIDE 10 MG: 5 TABLET ORAL at 07:43

## 2019-08-22 RX ADMIN — ONDANSETRON 4 MG: 2 INJECTION INTRAMUSCULAR; INTRAVENOUS at 10:43

## 2019-08-22 RX ADMIN — GABAPENTIN 300 MG: 300 CAPSULE ORAL at 15:20

## 2019-08-22 RX ADMIN — OXYCODONE HYDROCHLORIDE 10 MG: 5 TABLET ORAL at 03:29

## 2019-08-22 RX ADMIN — PIPERACILLIN AND TAZOBACTAM 3.38 G: 3; .375 INJECTION, POWDER, FOR SOLUTION INTRAVENOUS at 04:50

## 2019-08-22 RX ADMIN — OXYCODONE HYDROCHLORIDE 10 MG: 5 TABLET ORAL at 15:52

## 2019-08-22 RX ADMIN — Medication 250 MCG: at 08:13

## 2019-08-22 RX ADMIN — OXYCODONE HYDROCHLORIDE 10 MG: 5 TABLET ORAL at 11:53

## 2019-08-22 RX ADMIN — MULTIPLE VITAMINS W/ MINERALS TAB 1 TABLET: TAB at 08:10

## 2019-08-22 RX ADMIN — CARVEDILOL 6.25 MG: 6.25 TABLET, FILM COATED ORAL at 08:09

## 2019-08-22 RX ADMIN — DOCUSATE SODIUM 100 MG: 100 CAPSULE, LIQUID FILLED ORAL at 08:10

## 2019-08-22 RX ADMIN — SODIUM CHLORIDE: 9 INJECTION, SOLUTION INTRAVENOUS at 03:42

## 2019-08-22 RX ADMIN — GABAPENTIN 300 MG: 300 CAPSULE ORAL at 08:10

## 2019-08-22 RX ADMIN — FERROUS SULFATE TAB EC 325 MG (65 MG FE EQUIVALENT) 325 MG: 325 (65 FE) TABLET DELAYED RESPONSE at 08:10

## 2019-08-22 RX ADMIN — PIPERACILLIN AND TAZOBACTAM 3.38 G: 3; .375 INJECTION, POWDER, FOR SOLUTION INTRAVENOUS at 11:53

## 2019-08-22 RX ADMIN — APIXABAN 2.5 MG: 2.5 TABLET, FILM COATED ORAL at 08:09

## 2019-08-22 ASSESSMENT — PAIN DESCRIPTION - PROGRESSION
CLINICAL_PROGRESSION: NOT CHANGED
CLINICAL_PROGRESSION: GRADUALLY IMPROVING
CLINICAL_PROGRESSION: NOT CHANGED

## 2019-08-22 ASSESSMENT — PAIN DESCRIPTION - ONSET: ONSET: PROGRESSIVE

## 2019-08-22 ASSESSMENT — PAIN SCALES - GENERAL
PAINLEVEL_OUTOF10: 7
PAINLEVEL_OUTOF10: 9
PAINLEVEL_OUTOF10: 8
PAINLEVEL_OUTOF10: 7
PAINLEVEL_OUTOF10: 8

## 2019-08-22 ASSESSMENT — ENCOUNTER SYMPTOMS
SHORTNESS OF BREATH: 0
COUGH: 0
EYE ITCHING: 0
EYE DISCHARGE: 0
APNEA: 0
CONSTIPATION: 0
EYE PAIN: 0
COLOR CHANGE: 0
FACIAL SWELLING: 0
STRIDOR: 0
CHEST TIGHTNESS: 0
VOMITING: 0
ABDOMINAL PAIN: 1
ROS SKIN COMMENTS: JAUNDICE
SORE THROAT: 0
ABDOMINAL DISTENTION: 0

## 2019-08-22 ASSESSMENT — PAIN DESCRIPTION - LOCATION
LOCATION: HIP

## 2019-08-22 ASSESSMENT — PAIN DESCRIPTION - PAIN TYPE
TYPE: SURGICAL PAIN
TYPE: SURGICAL PAIN
TYPE: ACUTE PAIN;SURGICAL PAIN

## 2019-08-22 ASSESSMENT — PAIN DESCRIPTION - ORIENTATION
ORIENTATION: RIGHT

## 2019-08-22 ASSESSMENT — PAIN DESCRIPTION - FREQUENCY: FREQUENCY: CONTINUOUS

## 2019-08-22 ASSESSMENT — PAIN DESCRIPTION - DIRECTION: RADIATING_TOWARDS: DOWN LEG TO KNEE

## 2019-08-22 ASSESSMENT — PAIN - FUNCTIONAL ASSESSMENT: PAIN_FUNCTIONAL_ASSESSMENT: ACTIVITIES ARE NOT PREVENTED

## 2019-08-22 ASSESSMENT — PAIN DESCRIPTION - DESCRIPTORS: DESCRIPTORS: SHARP

## 2019-08-22 NOTE — PLAN OF CARE
Problem: SAFETY  Goal: Free from accidental physical injury  8/22/2019 1523 by Priscilla Matthews RN  Outcome: Completed  8/22/2019 0642 by Lyn Beltran RN  Outcome: Met This Shift     Problem: PAIN  Goal: Patient's pain/discomfort is manageable  8/22/2019 1523 by Priscilla Matthews RN  Outcome: Completed  8/22/2019 0642 by Lyn Beltran RN  Outcome: Met This Shift     Problem: Falls - Risk of:  Goal: Will remain free from falls  Description  Will remain free from falls  8/22/2019 1523 by Priscilla Matthews RN  Outcome: Completed  8/22/2019 0642 by Lyn Beltran RN  Outcome: Met This Shift  Goal: Absence of physical injury  Description  Absence of physical injury  8/22/2019 1523 by Priscilla Matthews RN  Outcome: Completed  8/22/2019 0642 by Lyn Beltran RN  Outcome: Met This Shift     Problem: Pain:  Goal: Pain level will decrease  Description  Pain level will decrease  Outcome: Completed  Goal: Control of acute pain  Description  Control of acute pain  Outcome: Completed  Goal: Control of chronic pain  Description  Control of chronic pain  Outcome: Completed     Problem: Musculor/Skeletal Functional Status  Goal: Highest potential functional level  Outcome: Completed     Problem: Discharge Planning:  Goal: Discharged to appropriate level of care  Description  Discharged to appropriate level of care  Outcome: Completed     Problem: Nutrition  Goal: Optimal nutrition therapy  Outcome: Completed     Problem: Risk for Impaired Skin Integrity  Goal: Tissue integrity - skin and mucous membranes  Description  Structural intactness and normal physiological function of skin and  mucous membranes.   Outcome: Completed

## 2019-08-22 NOTE — DISCHARGE SUMMARY
Usama Baker 19    Discharge Summary     Patient ID: Spencer Kaba  :  1955   MRN: [id number]     ACCOUNT:  [de-identified]   Patient's PCP: No primary care provider on file. Admit Date: 8/10/2019   Discharge Date: 2019   Length of Stay: 12  Code Status:  Full Code  Admitting Physician: Corrina Hewitt DO  Discharge Physician: Nina Hayes MD     Active Discharge Diagnoses:     Hospital Problem Lists:  Principal Problem:    Avascular necrosis of bone of right hip [facility])  Active Problems:    Essential hypertension    Anemia    Closed fracture of head of right femur ([facility]    [facility] without exacerbation [facility]    History of hepatitis C    Postoperative anemia due to acute blood loss    Acute cystitis without hematuria    Avascular necrosis (HCC)    Fever and chills    Elevated procalcitonin    CRP elevated    Transaminitis    Hyperbilirubinemia  Resolved Problems:    Hypokalemia    Thrombocytosis ([facility]        Discharged Condition: stable    Hospital Stay:     Hospital Course: The patient is E 56 y.o.  Non-/non  female who presents with Hip Pain (right hip pain, awaiting surgery for hip fx per pt)   and she is admitted to the hospital for the management of right hip pain with avascular necrosis and possible new microfracture.  This is a 57-year-old Afro-American female who presents with worsening right hip pain and inability to ambulate. Kacie Elise has known history of avascular necrosis and over the last several days has had increasing pain with decreased activity tolerance.    Patient underwent right total hip arthroplasty by Dr. Love Peralta. Postoperatively patient had anemia of acute blood loss. It was stabilized. Patient then was waiting for placement. During that time she developed fevers with right-sided pain in the right lower abdomen in the right upper abdomen and in the right leg.   She was evaluated by orthopedic surgery Roosevelt Rincon MD  [address] [place] [address] [phone]    In 2 weeks         Requiring Further Evaluation/Follow Up POST HOSPITALIZATION/Incidental Findings: CBC and compressive metabolic panel by rehab physician  Monitor vitals    Diet: cardiac diet    Activity: per ortho      Discharge Medications:      Medication List      START taking these medications    apixaban 2.5 MG Tabs tablet  Commonly known as:  ELIQUIS  Take 1 tablet by mouth 2 times daily     docusate 100 MG Caps  Commonly known as:  COLACE, DULCOLAX  Take 100 mg by mouth 2 times daily     oxyCODONE 5 MG immediate release tablet  Commonly known as:  ROXICODONE  Take 1 tablet by mouth every 6 hours as needed for Pain for up to 5 days.      piperacillin-tazobactam 3.375 (3-0.375) g injection  Commonly known as:  ZOSYN  Inject 3.375 g into the muscle every 8 hours for 4 days Stop after 8/25/19 and pull the line     polyethylene glycol powder  Commonly known as:  GLYCOLAX  Take 17 g by mouth daily as needed (constipation)        CONTINUE taking these medications    acetaminophen 325 MG tablet  Commonly known as:  TYLENOL  Take 1 tablet by mouth every 6 hours as needed for Pain     albuterol sulfate  (90 Base) MCG/ACT inhaler  Inhale 1-2 puffs into the lungs every 4 hours as needed for Wheezing or Shortness of Breath (Space out to every 6 hours as symptoms improve)     benzonatate 100 MG capsule  Commonly known as:  TESSALON  Take 1 capsule by mouth 3 times daily as needed for Cough     carvedilol 6.25 MG tablet  Commonly known as:  COREG  Take 1 tablet by mouth 2 times daily (with meals)     cyanocobalamin 250 MCG tablet  Take 1 tablet by mouth daily     ferrous sulfate 325 (65 Fe) MG tablet  Take 1 tablet by mouth daily     folic acid 1 MG tablet  Commonly known as:  FOLVITE  Take 1 tablet by mouth daily     gabapentin 300 MG capsule  Commonly known as:  NEURONTIN     hydrOXYzine 25 MG tablet  Commonly known as:  ATARAX  Take 1 tablet

## 2019-08-22 NOTE — PROGRESS NOTES
well-developed and well-nourished. HENT:   Head: Normocephalic and atraumatic. Eyes: Pupils are equal, round, and reactive to light. Conjunctivae are normal. Scleral icterus is present. Neck: Normal range of motion. Neck supple. No tracheal deviation present. Cardiovascular: Normal rate, regular rhythm and normal heart sounds. Exam reveals no friction rub. No murmur heard. Pulmonary/Chest: Effort normal and breath sounds normal. No stridor. No respiratory distress. Abdominal: She exhibits distension. She exhibits no mass. There is tenderness. There is no rebound and no guarding. Genitourinary:   Genitourinary Comments: Urine is clear, no Sharpe   Musculoskeletal: Normal range of motion. She exhibits no edema or deformity. Neurological: She is alert and oriented to person, place, and time. No cranial nerve deficit or sensory deficit. Coordination normal.   Skin: Skin is warm and dry. No erythema. Psychiatric: She has a normal mood and affect. Her behavior is normal. Thought content normal.         Medical Decision Making:   I have independently reviewed/ordered the following labs:    CBC with Differential:   Recent Labs     08/20/19  1003  08/21/19  1644 08/22/19  0531   WBC 10.9  --   --  10.2   HGB 7.7*   < > 7.4* 7.9*   HCT 25.3*   < > 24.8* 26.0*     --   --  592*   LYMPHOPCT 14*  --   --  17*   MONOPCT 21*  --   --  23*    < > = values in this interval not displayed. BMP:  Recent Labs     08/22/19  0531      K 3.9      CO2 23   BUN 4*   CREATININE 0.46*     Hepatic Function Panel:   Recent Labs     08/21/19  1715 08/22/19  0531   PROT 6.1* 6.2*   LABALBU 2.5* 2.6*   BILIDIR 0.92* 1.01*   IBILI 0.55 0.61   BILITOT 1.47* 1.62*   ALKPHOS 154* 148*   ALT 67* 59*   * 85*     No results for input(s): RPR in the last 72 hours. No results for input(s): HIV in the last 72 hours. No results for input(s): BC in the last 72 hours.   Lab Results   Component Value Date

## 2019-08-22 NOTE — PROGRESS NOTES
diet, Modify current ONS  Continued Inpatient Monitoring, Education Not Indicated    Nutrition Evaluation:   · Evaluation: Progress towards goals declining   · Goals: Pt to consume >75% of meals/supplements     · Monitoring: Meal Intake, Supplement Intake      Electronically signed by Raven Neri RD, LD on 8/22/19 at 1:47 PM    Contact Number: [phone]

## 2019-08-22 NOTE — PROGRESS NOTES
3/27/2017); Upper gastrointestinal endoscopy (11/05/2018); Upper gastrointestinal endoscopy (N/A, 11/5/2018); pr colonoscopy w/biopsy single/multiple (N/A, 11/10/2018); pr egd transoral biopsy single/multiple (N/A, 11/9/2018); hip surgery (Right, 08/13/2019); and Total hip arthroplasty (Right, 8/13/2019). Restrictions  Restrictions/Precautions  Restrictions/Precautions: Weight Bearing  Lower Extremity Weight Bearing Restrictions  Right Lower Extremity Weight Bearing: Weight Bearing As Tolerated  Position Activity Restriction  Other position/activity restrictions: Anterior hip precautions   No SLR's  Subjective   General  Chart Reviewed: Yes  Response To Previous Treatment: Patient with no complaints from previous session. Family / Caregiver Present: No  Subjective  Subjective: Pt reluctantly agreeable to bed exercises, declined any OOB or ambulation adamently. Pt states she amb to bathroom with other staff. Pain Screening  Patient Currently in Pain: Yes  Pain Assessment  Pain Assessment: 0-10  Pain Level: 9  Pain Type: Acute pain;Surgical pain  Pain Location: Hip  Pain Orientation: Right  Non-Pharmaceutical Pain Intervention(s): Ambulation/Increased Activity;Repositioned; Emotional support  Response to Pain Intervention: Patient Satisfied  Vital Signs  Patient Currently in Pain: Yes  Pre Treatment Pain Screening  Intervention List: Patient able to continue with treatment    Orientation  Orientation  Overall Orientation Status: Within Functional Limits  Cognition   Cognition  Overall Cognitive Status: WFL  Objective   Bed mobility  Comment: Pt adamently declined                  AROM RLE (degrees)  RLE General AROM: poor tolerance   Exercises:  Supine Exercises:  Ankle Pumps, Gluteal sets, Heel Slides (AAROM RLE, only 2 minimal range d/t pain), Hip ABD/ADD (author maintained RLE off bed, pt did no SLR), Quad Sets, shld scaption, shld punches Reps: 12                  Goals  Short term goals  Time Frame for Short

## 2019-08-22 NOTE — PROGRESS NOTES
Occupational Therapy Not Seen Note    DATE: 2019  Name: Juju Lindsey  : 1955  MRN: [id number]    Patient not available for Occupational Therapy due to: Attempt this am at 9:56 but pt declined OT tx d/t pain and just finished working w. PT. Max encouragement given to participate but poor return.  Writer attempted to s/u a time to return later this afternoon but pt became agitated and wanted to rest.     Next Scheduled Treatment: 19    Electronically signed by VERONIQUE Villalba on 2019 at 9:59 AM

## 2019-08-22 NOTE — PROGRESS NOTES
Patient transported out via [facility] on a stretcher, all belongings accounted for.    Electronically signed by Gurjit Valdez RN on 8/22/2019 at 4:10 PM

## 2019-08-25 LAB
CULTURE: NORMAL
Lab: NORMAL
SPECIMEN DESCRIPTION: NORMAL

## 2019-08-28 ENCOUNTER — APPOINTMENT (OUTPATIENT)
Dept: GENERAL RADIOLOGY | Age: 64
End: 2019-08-28
Payer: MEDICARE

## 2019-08-28 ENCOUNTER — HOSPITAL ENCOUNTER (EMERGENCY)
Age: 64
Discharge: HOME | End: 2019-08-28
Attending: EMERGENCY MEDICINE
Payer: MEDICARE

## 2019-08-28 VITALS
HEART RATE: 85 BPM | DIASTOLIC BLOOD PRESSURE: 111 MMHG | TEMPERATURE: 99.9 F | RESPIRATION RATE: 14 BRPM | OXYGEN SATURATION: 96 % | SYSTOLIC BLOOD PRESSURE: 185 MMHG

## 2019-08-28 DIAGNOSIS — L76.34 POSTOPERATIVE SEROMA OF SUBCUTANEOUS TISSUE AFTER NON-DERMATOLOGIC PROCEDURE: Primary | ICD-10-CM

## 2019-08-28 LAB
ANION GAP SERPL CALCULATED.3IONS-SCNC: 16 MMOL/L (ref 9–17)
BUN BLDV-MCNC: 7 MG/DL (ref 8–23)
BUN/CREAT BLD: ABNORMAL (ref 9–20)
C-REACTIVE PROTEIN: 12.8 MG/L (ref 0–5)
CALCIUM SERPL-MCNC: 9.6 MG/DL (ref 8.6–10.4)
CHLORIDE BLD-SCNC: 101 MMOL/L (ref 98–107)
CO2: 20 MMOL/L (ref 20–31)
CREAT SERPL-MCNC: 0.42 MG/DL (ref 0.5–0.9)
GFR AFRICAN AMERICAN: >60 ML/MIN
GFR NON-AFRICAN AMERICAN: >60 ML/MIN
GFR SERPL CREATININE-BSD FRML MDRD: ABNORMAL ML/MIN/{1.73_M2}
GFR SERPL CREATININE-BSD FRML MDRD: ABNORMAL ML/MIN/{1.73_M2}
GLUCOSE BLD-MCNC: 73 MG/DL (ref 70–99)
HCT VFR BLD CALC: 31.2 % (ref 36.3–47.1)
HCT VFR BLD CALC: 33.3 % (ref 36.3–47.1)
HEMOGLOBIN: 9.1 G/DL (ref 11.9–15.1)
HEMOGLOBIN: 9.8 G/DL (ref 11.9–15.1)
MCH RBC QN AUTO: 28.3 PG (ref 25.2–33.5)
MCH RBC QN AUTO: 28.3 PG (ref 25.2–33.5)
MCHC RBC AUTO-ENTMCNC: 29.2 G/DL (ref 28.4–34.8)
MCHC RBC AUTO-ENTMCNC: 29.4 G/DL (ref 28.4–34.8)
MCV RBC AUTO: 96.2 FL (ref 82.6–102.9)
MCV RBC AUTO: 97.2 FL (ref 82.6–102.9)
NRBC AUTOMATED: 0 PER 100 WBC
NRBC AUTOMATED: 0 PER 100 WBC
PDW BLD-RTO: 22.2 % (ref 11.8–14.4)
PDW BLD-RTO: 22.2 % (ref 11.8–14.4)
PLATELET # BLD: ABNORMAL K/UL (ref 138–453)
PLATELET # BLD: ABNORMAL K/UL (ref 138–453)
PLATELET, FLUORESCENCE: 1426 K/UL (ref 138–453)
PLATELET, FLUORESCENCE: 1494 K/UL (ref 138–453)
PLATELET, IMMATURE FRACTION: 1.5 % (ref 1.1–10.3)
PLATELET, IMMATURE FRACTION: 1.6 % (ref 1.1–10.3)
PMV BLD AUTO: ABNORMAL FL (ref 8.1–13.5)
PMV BLD AUTO: ABNORMAL FL (ref 8.1–13.5)
POTASSIUM SERPL-SCNC: 3.4 MMOL/L (ref 3.7–5.3)
RBC # BLD: 3.21 M/UL (ref 3.95–5.11)
RBC # BLD: 3.46 M/UL (ref 3.95–5.11)
SEDIMENTATION RATE, ERYTHROCYTE: 56 MM (ref 0–20)
SODIUM BLD-SCNC: 137 MMOL/L (ref 135–144)
WBC # BLD: 5.9 K/UL (ref 3.5–11.3)
WBC # BLD: 6 K/UL (ref 3.5–11.3)

## 2019-08-28 PROCEDURE — 86140 C-REACTIVE PROTEIN: CPT

## 2019-08-28 PROCEDURE — 99284 EMERGENCY DEPT VISIT MOD MDM: CPT

## 2019-08-28 PROCEDURE — 85055 RETICULATED PLATELET ASSAY: CPT

## 2019-08-28 PROCEDURE — 80048 BASIC METABOLIC PNL TOTAL CA: CPT

## 2019-08-28 PROCEDURE — 85651 RBC SED RATE NONAUTOMATED: CPT

## 2019-08-28 PROCEDURE — 85027 COMPLETE CBC AUTOMATED: CPT

## 2019-08-28 PROCEDURE — 73502 X-RAY EXAM HIP UNI 2-3 VIEWS: CPT

## 2019-08-28 RX ORDER — DOXYCYCLINE HYCLATE 100 MG/1
100 CAPSULE ORAL 2 TIMES DAILY
Qty: 20 CAPSULE | Refills: 0 | Status: SHIPPED | OUTPATIENT
Start: 2019-08-28 | End: 2019-09-07

## 2019-08-28 ASSESSMENT — ENCOUNTER SYMPTOMS
SORE THROAT: 0
SHORTNESS OF BREATH: 0
NAUSEA: 0
COUGH: 0
WHEEZING: 0
RHINORRHEA: 0
DIARRHEA: 0
ABDOMINAL DISTENTION: 0
VOMITING: 0
CONSTIPATION: 0

## 2019-08-28 ASSESSMENT — PAIN DESCRIPTION - FREQUENCY: FREQUENCY: INTERMITTENT

## 2019-08-28 ASSESSMENT — PAIN DESCRIPTION - ORIENTATION: ORIENTATION: RIGHT

## 2019-08-28 ASSESSMENT — PAIN DESCRIPTION - LOCATION: LOCATION: HIP

## 2019-08-28 ASSESSMENT — PAIN SCALES - GENERAL: PAINLEVEL_OUTOF10: 6

## 2019-08-28 ASSESSMENT — PAIN DESCRIPTION - DESCRIPTORS: DESCRIPTORS: DISCOMFORT

## 2019-08-28 NOTE — ED NOTES
Patient does not want pop and wants to leave, resident notified.       Cely Marroquin, RN  08/28/19 5163

## 2019-08-28 NOTE — ED PROVIDER NOTES
[facility] ED  Emergency Department  Emergency Medicine Resident Sign-out     Care of Emi Daniels was assumed from Dr. Tri Dubose  and is being seen for Post-op Problem (right hip)  . The patient's initial evaluation and plan have been discussed with the prior provider who initially evaluated the patient.      EMERGENCY DEPARTMENT COURSE / MEDICAL DECISION MAKING:       MEDICATIONS GIVEN:  Orders Placed This Encounter   Medications    doxycycline hyclate (VIBRAMYCIN) 100 MG capsule     Sig: Take 1 capsule by mouth 2 times daily for 10 days     Dispense:  20 capsule     Refill:  0       LABS / RADIOLOGY:     Labs Reviewed   CBC - Abnormal; Notable for the following components:       Result Value    RBC 3.46 (*)     Hemoglobin 9.8 (*)     Hematocrit 33.3 (*)     RDW 22.2 (*)     All other components within normal limits   BASIC METABOLIC PANEL - Abnormal; Notable for the following components:    BUN 7 (*)     CREATININE 0.42 (*)     Potassium 3.4 (*)     All other components within normal limits   C-REACTIVE PROTEIN - Abnormal; Notable for the following components:    CRP 12.8 (*)     All other components within normal limits   SEDIMENTATION RATE - Abnormal; Notable for the following components:    Sed Rate 56 (*)     All other components within normal limits   IMMATURE PLATELET FRACTION - Abnormal; Notable for the following components:    Platelet, Fluorescence 1,494 (*)     All other components within normal limits   CBC - Abnormal; Notable for the following components:    RBC 3.21 (*)     Hemoglobin 9.1 (*)     Hematocrit 31.2 (*)     RDW 22.2 (*)     All other components within normal limits   IMMATURE PLATELET FRACTION - Abnormal; Notable for the following components:    Platelet, Fluorescence 1,426 (*)     All other components within normal limits       Xr Hip Right (2-3 Views)    Result Date: 8/28/2019  EXAMINATION: TWO XRAY VIEWS OF THE RIGHT HIP 8/28/2019 12:52 pm COMPARISON: Right hip
Authorizing Provider   doxycycline hyclate (VIBRAMYCIN) 100 MG capsule Take 1 capsule by mouth 2 times daily for 10 days 8/28/19 9/7/19 Yes Judy Sapp, DO   apixaban (ELIQUIS) 2.5 MG TABS tablet Take 1 tablet by mouth 2 times daily 8/22/19 10/3/19  Roxana Ortiz MD   docusate sodium (COLACE, DULCOLAX) 100 MG CAPS Take 100 mg by mouth 2 times daily 8/22/19   Roxana Ortiz MD   polyethylene glycol [facility]) powder Take 17 g by mouth daily as needed (constipation) 8/22/19 9/21/19  Roxana Ortiz MD   Mis.  Devices [facility]) MISC 1 Device by Does not apply route once for 1 dose 8/5/19 8/5/19  Sravan Bates PA-C   hydrOXYzine (ATARAX) 25 MG tablet Take 1 tablet by mouth every 8 hours as needed for Itching 8/5/19   Sravan Bates PA-C   acetaminophen (TYLENOL) 325 MG tablet Take 1 tablet by mouth every 6 hours as needed for Pain 6/22/19   Verna Orlando,    benzonatate (TESSALON PERLES) 100 MG capsule Take 1 capsule by mouth 3 times daily as needed for Cough 3/11/19   Marco Lopez,    carvedilol (COREG) 6.25 MG tablet Take 1 tablet by mouth 2 times daily (with meals) 2/7/19   Derrick Rowe MD   ferrous sulfate (MELISSA-HAO) 325 (65 Fe) MG tablet Take 1 tablet by mouth daily 2/7/19   Derrick Rowe MD   melatonin 1 MG tablet Take 1 tablet by mouth nightly as needed for Sleep 1/11/19   Julio Anderson MD   ipratropium-albuterol (DUONEB) 0.5-2.5 (3) MG/3ML SOLN nebulizer solution Inhale 3 mLs into the lungs every 4 hours as needed for Shortness of Breath 1/11/19   Julio Anderson MD   mometasone-formoterol [facility]) 100-5 MCG/ACT inhaler Inhale 2 puffs into the lungs 2 times daily 12/5/18   Judi Ontiveros MD   albuterol sulfate HFA (PROVENTIL HFA) 108 (90 Base) MCG/ACT inhaler Inhale 1-2 puffs into the lungs every 4 hours as needed for Wheezing or Shortness of Breath (Space out to every 6 hours as symptoms improve) 12/5/18   Judi Ontiveros MD   pantoprazole (PROTONIX) 40 MG tablet Take 1 tablet by mouth 2

## 2019-08-28 NOTE — CONSULTS
Orthopedic Surgery Consult  (Dr. Jodi Coronado)                   CC/Reason for consult: Status post right BASILIO post-operative surgical site drainage    HPI:    The patient is a 75 y.o. female who presents today with complaints of drainage from her surgical site incision. She is status post right total hip arthroplasty for right hip AVN, date of surgery 8/13/2019. Her surgical site is located anteriorly on the right hip. She states that she noticed today that her current dressing was starting to get saturated. She denies any onset of pain and states that she is able to ambulate well at home with therapy and her walker. She admits to some numbness only at her right heel but is unsure if this is something that is new or chronic. She is currently on Eliquis 2.5mg BID for DVT prophylaxis postoperatively. She denies any fevers, chest pains, shortness of breath, nausea or vomiting. She has no other complaints or concerns at this time and was singularly worried about the drainage from her wound.       Past Medical History:    Past Medical History:   Diagnosis Date    Appendicitis 3/27/2017    CHF (congestive heart failure) (HCC)     Chronic respiratory failure with hypoxia and hypercapnia (HCC)     Chronic rhinitis     Cigarette smoker     COPD (chronic obstructive pulmonary disease) ([address] 8/5/2018    Depression     Dysphagia     Essential hypertension 8/11/2018    GERD (gastroesophageal reflux disease)     Heart failure, diastolic, with acute decompensation ([address] 9/5/2018    Hepatitis C, chronic (HCC)     History of smoking at least 1 pack per day for at least 30 years     Hyperlipidemia     Hypertension     Migraine     Moderate COPD (chronic obstructive pulmonary disease) (HCC)     Multiple transfusions     Osteoarthritis     hands    Pneumonia     Scoliosis     Substance abuse ([address]     ivda and cocaine abuse in past       Past Surgical History:    Past Surgical History:   Procedure Laterality Date Cough 3/11/19   Danny Lopez DO   carvedilol (COREG) 6.25 MG tablet Take 1 tablet by mouth 2 times daily (with meals) 2/7/19   Santos French MD   ferrous sulfate (MELISSA-HAO) 325 (65 Fe) MG tablet Take 1 tablet by mouth daily 2/7/19   Santos French MD   melatonin 1 MG tablet Take 1 tablet by mouth nightly as needed for Sleep 1/11/19   Elaine Mitchell MD   ipratropium-albuterol (DUONEB) 0.5-2.5 (3) MG/3ML SOLN nebulizer solution Inhale 3 mLs into the lungs every 4 hours as needed for Shortness of Breath 1/11/19   Elaine Mitchell MD   mometasone-formoterol [facility]) 100-5 MCG/ACT inhaler Inhale 2 puffs into the lungs 2 times daily 12/5/18   Ernst Bonilla MD   albuterol sulfate HFA (PROVENTIL HFA) 108 (90 Base) MCG/ACT inhaler Inhale 1-2 puffs into the lungs every 4 hours as needed for Wheezing or Shortness of Breath (Space out to every 6 hours as symptoms improve) 12/5/18   Ernst Bonilla MD   pantoprazole (PROTONIX) 40 MG tablet Take 1 tablet by mouth 2 times daily (before meals) 11/13/18   Emanuel Hidalgo MD   folic acid (FOLVITE) 1 MG tablet Take 1 tablet by mouth daily 10/13/18   Ronak Josue MD   vitamin B-1 100 MG tablet Take 1 tablet by mouth daily 10/13/18   Ronak Josue MD   vitamin B-12 250 MCG tablet Take 1 tablet by mouth daily 10/13/18   Ronak Josue MD   gabapentin (NEURONTIN) 300 MG capsule Take 300 mg by mouth 3 times daily. Tamika Dye MD   Multiple Vitamins-Minerals (THERAPEUTIC MULTIVITAMIN-MINERALS) tablet Take 1 tablet by mouth daily 8/12/18   Aiden Marie MD       Allergies:    Aspirin and Shadia Douglas [cephalexin]    Social History:   Social History     Socioeconomic History    Marital status: Single     Spouse name: None    Number of children: None    Years of education: None    Highest education level: None   Occupational History    None   Social Needs    Financial resource strain: None    Food insecurity:     Worry: None     Inability: None    Transportation

## 2019-08-28 NOTE — ED NOTES
Pt c/o bleeding at incision from having her right hip replaced. Dressing did have some blood. Skin intact at incision site, pt reports she is able to get around since surgery. Denies chest pain and SOB.       Rodger Dior  08/28/19 7105

## 2019-08-29 ENCOUNTER — TELEPHONE (OUTPATIENT)
Dept: ONCOLOGY | Age: 64
End: 2019-08-29

## 2019-09-03 ENCOUNTER — TELEPHONE (OUTPATIENT)
Dept: CASE MANAGEMENT | Age: 64
End: 2019-09-03

## 2019-09-04 ENCOUNTER — HOSPITAL ENCOUNTER (EMERGENCY)
Age: 64
Discharge: HOME | End: 2019-09-04
Attending: EMERGENCY MEDICINE
Payer: MEDICARE

## 2019-09-04 ENCOUNTER — APPOINTMENT (OUTPATIENT)
Dept: GENERAL RADIOLOGY | Age: 64
End: 2019-09-04
Payer: MEDICARE

## 2019-09-04 VITALS
SYSTOLIC BLOOD PRESSURE: 147 MMHG | WEIGHT: 98 LBS | HEIGHT: 60 IN | HEART RATE: 106 BPM | OXYGEN SATURATION: 98 % | BODY MASS INDEX: 19.24 KG/M2 | DIASTOLIC BLOOD PRESSURE: 91 MMHG | TEMPERATURE: 99 F | RESPIRATION RATE: 18 BRPM

## 2019-09-04 DIAGNOSIS — R11.0 NAUSEA: ICD-10-CM

## 2019-09-04 DIAGNOSIS — R51.9 NONINTRACTABLE HEADACHE, UNSPECIFIED CHRONICITY PATTERN, UNSPECIFIED HEADACHE TYPE: Primary | ICD-10-CM

## 2019-09-04 LAB
ABSOLUTE EOS #: 0.22 K/UL (ref 0–0.4)
ABSOLUTE IMMATURE GRANULOCYTE: 0 K/UL (ref 0–0.3)
ABSOLUTE LYMPH #: 1.22 K/UL (ref 1–4.8)
ABSOLUTE MONO #: 0.58 K/UL (ref 0.1–0.8)
ACETAMINOPHEN LEVEL: <5 UG/ML (ref 10–30)
ALLEN TEST: ABNORMAL
ANION GAP SERPL CALCULATED.3IONS-SCNC: 19 MMOL/L (ref 9–17)
BASOPHILS # BLD: 0 % (ref 0–2)
BASOPHILS ABSOLUTE: 0 K/UL (ref 0–0.2)
BUN BLDV-MCNC: 9 MG/DL (ref 8–23)
BUN/CREAT BLD: ABNORMAL (ref 9–20)
CALCIUM SERPL-MCNC: 9.8 MG/DL (ref 8.6–10.4)
CHLORIDE BLD-SCNC: 99 MMOL/L (ref 98–107)
CO2: 23 MMOL/L (ref 20–31)
CREAT SERPL-MCNC: 0.47 MG/DL (ref 0.5–0.9)
DIFFERENTIAL TYPE: ABNORMAL
EOSINOPHILS RELATIVE PERCENT: 3 % (ref 1–4)
ETHANOL PERCENT: <0.01 %
ETHANOL: <10 MG/DL
FIO2: ABNORMAL
GFR AFRICAN AMERICAN: >60 ML/MIN
GFR NON-AFRICAN AMERICAN: >60 ML/MIN
GFR SERPL CREATININE-BSD FRML MDRD: ABNORMAL ML/MIN/{1.73_M2}
GFR SERPL CREATININE-BSD FRML MDRD: ABNORMAL ML/MIN/{1.73_M2}
GLUCOSE BLD-MCNC: 71 MG/DL (ref 70–99)
HCO3 VENOUS: 21.9 MMOL/L (ref 22–29)
HCT VFR BLD CALC: 39.7 % (ref 36.3–47.1)
HEMOGLOBIN: 11.6 G/DL (ref 11.9–15.1)
IMMATURE GRANULOCYTES: 0 %
LYMPHOCYTES # BLD: 17 % (ref 24–44)
MCH RBC QN AUTO: 27.1 PG (ref 25.2–33.5)
MCHC RBC AUTO-ENTMCNC: 29.2 G/DL (ref 28.4–34.8)
MCV RBC AUTO: 92.8 FL (ref 82.6–102.9)
MODE: ABNORMAL
MONOCYTES # BLD: 8 % (ref 1–7)
MORPHOLOGY: ABNORMAL
NEGATIVE BASE EXCESS, VEN: 3 (ref 0–2)
NRBC AUTOMATED: 0 PER 100 WBC
O2 DEVICE/FLOW/%: ABNORMAL
O2 SAT, VEN: 49 % (ref 60–85)
PATIENT TEMP: ABNORMAL
PCO2, VEN: 38.4 MM HG (ref 41–51)
PDW BLD-RTO: 20.7 % (ref 11.8–14.4)
PH VENOUS: 7.36 (ref 7.32–7.43)
PLATELET # BLD: 954 K/UL (ref 138–453)
PLATELET ESTIMATE: ABNORMAL
PMV BLD AUTO: 8.9 FL (ref 8.1–13.5)
PO2, VEN: 27.3 MM HG (ref 30–50)
POC PCO2 TEMP: ABNORMAL MM HG
POC PH TEMP: ABNORMAL
POC PO2 TEMP: ABNORMAL MM HG
POSITIVE BASE EXCESS, VEN: ABNORMAL (ref 0–3)
POTASSIUM SERPL-SCNC: 3.8 MMOL/L (ref 3.7–5.3)
RBC # BLD: 4.28 M/UL (ref 3.95–5.11)
RBC # BLD: ABNORMAL 10*6/UL
SALICYLATE LEVEL: <1 MG/DL (ref 3–10)
SAMPLE SITE: ABNORMAL
SEG NEUTROPHILS: 72 % (ref 36–66)
SEGMENTED NEUTROPHILS ABSOLUTE COUNT: 5.18 K/UL (ref 1.8–7.7)
SODIUM BLD-SCNC: 141 MMOL/L (ref 135–144)
TOTAL CO2, VENOUS: 23 MMOL/L (ref 23–30)
TOXIC TRICYCLIC SC,BLOOD: NEGATIVE
WBC # BLD: 7.2 K/UL (ref 3.5–11.3)
WBC # BLD: ABNORMAL 10*3/UL

## 2019-09-04 PROCEDURE — 73502 X-RAY EXAM HIP UNI 2-3 VIEWS: CPT

## 2019-09-04 PROCEDURE — 99284 EMERGENCY DEPT VISIT MOD MDM: CPT

## 2019-09-04 PROCEDURE — 85025 COMPLETE CBC W/AUTO DIFF WBC: CPT

## 2019-09-04 PROCEDURE — 96375 TX/PRO/DX INJ NEW DRUG ADDON: CPT

## 2019-09-04 PROCEDURE — 82803 BLOOD GASES ANY COMBINATION: CPT

## 2019-09-04 PROCEDURE — 6360000002 HC RX W HCPCS: Performed by: STUDENT IN AN ORGANIZED HEALTH CARE EDUCATION/TRAINING PROGRAM

## 2019-09-04 PROCEDURE — 80048 BASIC METABOLIC PNL TOTAL CA: CPT

## 2019-09-04 PROCEDURE — G0480 DRUG TEST DEF 1-7 CLASSES: HCPCS

## 2019-09-04 PROCEDURE — 96374 THER/PROPH/DIAG INJ IV PUSH: CPT

## 2019-09-04 PROCEDURE — 80307 DRUG TEST PRSMV CHEM ANLYZR: CPT

## 2019-09-04 RX ORDER — DIPHENHYDRAMINE HYDROCHLORIDE 50 MG/ML
25 INJECTION INTRAMUSCULAR; INTRAVENOUS ONCE
Status: COMPLETED | OUTPATIENT
Start: 2019-09-04 | End: 2019-09-04

## 2019-09-04 RX ORDER — PROCHLORPERAZINE EDISYLATE 5 MG/ML
10 INJECTION INTRAMUSCULAR; INTRAVENOUS ONCE
Status: COMPLETED | OUTPATIENT
Start: 2019-09-04 | End: 2019-09-04

## 2019-09-04 RX ADMIN — DIPHENHYDRAMINE HYDROCHLORIDE 25 MG: 50 INJECTION, SOLUTION INTRAMUSCULAR; INTRAVENOUS at 17:11

## 2019-09-04 RX ADMIN — PROCHLORPERAZINE EDISYLATE 10 MG: 5 INJECTION INTRAMUSCULAR; INTRAVENOUS at 17:11

## 2019-09-04 ASSESSMENT — ENCOUNTER SYMPTOMS
RHINORRHEA: 0
EYE ITCHING: 0
ABDOMINAL PAIN: 0
SHORTNESS OF BREATH: 0
VOMITING: 1
DIARRHEA: 0
COUGH: 0
BACK PAIN: 0
EYE REDNESS: 0
NAUSEA: 1

## 2019-09-04 ASSESSMENT — PAIN SCALES - GENERAL: PAINLEVEL_OUTOF10: 8

## 2019-09-04 ASSESSMENT — PAIN DESCRIPTION - DESCRIPTORS: DESCRIPTORS: ACHING;DISCOMFORT;HEADACHE

## 2019-09-04 ASSESSMENT — PAIN DESCRIPTION - LOCATION: LOCATION: HEAD

## 2019-09-04 NOTE — ED NOTES
Pt to room 11 with c/o headache pain and wound vac complications. Pt reports that she has diffuse headache pain. Pt reports that she is nauseous as well. Pt states that she had hip surgery and has a wound vac that \"keeps beeping and its driving me crazy\". Pt alert and oriented x4, talking in complete sentences, respirations even and unlabored. Pt acting age appropriate. White board updated, will continue to monitor.        Radha Weldon RN  09/04/19 0525

## 2019-09-04 NOTE — ED PROVIDER NOTES
Appendectomy (2017); laparoscopic appendectomy (N/A, 3/27/2017); Upper gastrointestinal endoscopy (2018); Upper gastrointestinal endoscopy (N/A, 2018); pr colonoscopy w/biopsy single/multiple (N/A, 11/10/2018); pr egd transoral biopsy single/multiple (N/A, 2018); hip surgery (Right, 2019); and Total hip arthroplasty (Right, 2019).     Social History     Socioeconomic History    Marital status: Single     Spouse name: Not on file    Number of children: Not on file    Years of education: Not on file    Highest education level: Not on file   Occupational History    Not on file   Social Needs    Financial resource strain: Not on file    Food insecurity:     Worry: Not on file     Inability: Not on file    Transportation needs:     Medical: Not on file     Non-medical: Not on file   Tobacco Use    Smoking status: Former Smoker     Packs/day: 1.00     Years: 40.00     Pack years: 40.00     Types: Cigarettes     Start date:      Last attempt to quit: 2018     Years since quittin.8    Smokeless tobacco: Never Used   Substance and Sexual Activity    Alcohol use: No     Alcohol/week: 0.0 standard drinks    Drug use: No     Types: IV, Cocaine     Comment: past history    Sexual activity: Not on file   Lifestyle    Physical activity:     Days per week: Not on file     Minutes per session: Not on file    Stress: Not on file   Relationships    Social connections:     Talks on phone: Not on file     Gets together: Not on file     Attends Christian service: Not on file     Active member of club or organization: Not on file     Attends meetings of clubs or organizations: Not on file     Relationship status: Not on file    Intimate partner violence:     Fear of current or ex partner: Not on file     Emotionally abused: Not on file     Physically abused: Not on file     Forced sexual activity: Not on file   Other Topics Concern    Not on file   Social History Narrative    Granulocyte 0.00 0.00 - 0.30 k/uL    Segs Absolute 5.18 1.8 - 7.7 k/uL    Absolute Lymph # 1.22 1.0 - 4.8 k/uL    Absolute Mono # 0.58 0.1 - 0.8 k/uL    Absolute Eos # 0.22 0.0 - 0.4 k/uL    Basophils Absolute 0.00 0.0 - 0.2 k/uL    Morphology ANISOCYTOSIS PRESENT    Basic Metabolic Panel   Result Value Ref Range    Glucose 71 70 - 99 mg/dL    BUN 9 8 - 23 mg/dL    CREATININE 0.47 (L) 0.50 - 0.90 mg/dL    Bun/Cre Ratio NOT REPORTED 9 - 20    Calcium 9.8 8.6 - 10.4 mg/dL    Sodium 141 135 - 144 mmol/L    Potassium 3.8 3.7 - 5.3 mmol/L    Chloride 99 98 - 107 mmol/L    CO2 23 20 - 31 mmol/L    Anion Gap 19 (H) 9 - 17 mmol/L    GFR Non-African American >60 >60 mL/min    GFR African American >60 >60 mL/min    GFR Comment          GFR Staging NOT REPORTED    TOX SCR, BLD, ED   Result Value Ref Range    Ethanol <10 <10 mg/dL    Ethanol percent <9.872 <9.986 %    Salicylate Lvl <1 (L) 3 - 10 mg/dL    Acetaminophen Level <5 (L) 10 - 30 ug/mL    Toxic Tricyclic Sc,Blood NEGATIVE NEGATIVE   Venous Blood Gas, POC   Result Value Ref Range    pH, Praveen 7.364 7.320 - 7.430    pCO2, Praveen 38.4 (L) 41.0 - 51.0 mm Hg    pO2, Praveen 27.3 (L) 30.0 - 50.0 mm Hg    HCO3, Venous 21.9 (L) 22.0 - 29.0 mmol/L    Total CO2, Venous 23 23.0 - 30.0 mmol/L    Negative Base Excess, Praveen 3 (H) 0.0 - 2.0    Positive Base Excess, Praveen NOT REPORTED 0.0 - 3.0    O2 Sat, Praveen 49 (L) 60.0 - 85.0 %    O2 Device/Flow/% NOT REPORTED     Ruslan Test NOT REPORTED     Sample Site NOT REPORTED     Mode NOT REPORTED     FIO2 NOT REPORTED     Pt Temp NOT REPORTED     POC pH Temp NOT REPORTED     POC pCO2 Temp NOT REPORTED mm Hg    POC pO2 Temp NOT REPORTED mm Hg       IMPRESSION:Emi Daniels is a 75 y.o. presenting with nausea vomiting epigastric pain and drainage from wound VAC. Will obtain orthopedic surgery consult for management of wound VAC given recent postop.   Patient is tachycardic slightly, appears well-hydrated on exam.  Other vitals within normal limits she is afebrile. Abdomen is nontender, not peritoneal.  No weakness or numbness of her lower extremities she is able to walk without pain. Low suspicion for meningitis or subarachnoid hemorrhage given patient's symptomatology regarding headache, will be treated with Compazine Benadryl fluids laboratory evaluation and reassess. RADIOLOGY:  None    EKG  None    All EKG's are interpreted by the Emergency Department Physician who either signs or Co-signs this chart in the absence of a cardiologist.    EMERGENCY DEPARTMENT COURSE:  Laboratory evaluation unremarkable. Patient feeling better after fluids and Compazine with Benadryl. With the surgery consulted switch wound VAC and will follow up with orthopedic surgery in 1 week. Patient discharged. PROCEDURES:  None    CONSULTS:  IP CONSULT TO ORTHOPEDIC SURGERY    CRITICAL CARE:  None    FINAL IMPRESSION      1. Nonintractable headache, unspecified chronicity pattern, unspecified headache type    2. Nausea          DISPOSITION / PLAN     DISPOSITION Decision To Discharge 09/04/2019 06:59:15 PM      PATIENT REFERRED TO:  No follow-up provider specified.     DISCHARGE MEDICATIONS:  Discharge Medication List as of 9/4/2019  6:59 PM          Jeff Blanca MD  Emergency Medicine Resident    (Please note that portions of thisnote were completed with a voice recognition program.  Efforts were made to edit the dictations but occasionally words are mis-transcribed.)        Jeff Blanca MD  09/04/19 1910

## 2019-09-04 NOTE — CONSULTS
Andreina Peters      CC/Reason for consult:  Right hip drainage. HPI:      The patient is a 75 y.o. female who presents to the ED with the complaint of headache and right hip wound VAC malfunction with drainage. Patient is a 47-year-old female who underwent a right total hip arthroplasty on 2019. Patient was most recently seen on 2019 at which time a Fernanda wound VAC was applied due to increased drainage at the incision site. Patient states that she has maintained the Fernanda wound VAC however she notes that it has been beeping for the past 3 to 4 days. Patient denies any recent fevers or chills. She has appropriate tenderness around the surgical site. She has no new falls or injuries.       Past Medical History:    Past Medical History:   Diagnosis Date    Appendicitis 3/27/2017    CHF (congestive heart failure) (HCC)     Chronic respiratory failure with hypoxia and hypercapnia (HCC)     Chronic rhinitis     Cigarette smoker     COPD (chronic obstructive pulmonary disease) ([address] 2018    Depression     Dysphagia     Essential hypertension 2018    GERD (gastroesophageal reflux disease)     Heart failure, diastolic, with acute decompensation ([address] 2018    Hepatitis C, chronic (HCC)     History of smoking at least 1 pack per day for at least 30 years     Hyperlipidemia     Hypertension     Migraine     Moderate COPD (chronic obstructive pulmonary disease) (HCC)     Multiple transfusions     Osteoarthritis     hands    Pneumonia     Scoliosis     Substance abuse ([address]     ivda and cocaine abuse in past       Past Surgical History:    Past Surgical History:   Procedure Laterality Date    APPENDECTOMY  2017     SECTION      GASTRIC BYPASS SURGERY      HIP SURGERY Right 2019    HIP TOTAL ARTHROPLASTY ANTERIOR APPROACH - [facility],  C-ARM, (Right )    LAPAROSCOPIC APPENDECTOMY N/A 3/27/2017    APPENDECTOMY LAPAROSCOPIC performed by Maren Boswell MD at [facility]  2002    hgsil    SC COLONOSCOPY W/BIOPSY SINGLE/MULTIPLE N/A 11/10/2018    COLONOSCOPY WITH BIOPSY performed by Micah Dos Santos MD at [facility]    SC EGD TRANSORAL BIOPSY SINGLE/MULTIPLE N/A 11/9/2018    EGD ESOPHAGOGASTRODUODENOSCOPY performed by Naida Rashid MD at [address] Right 8/13/2019    HIP TOTAL ARTHROPLASTY ANTERIOR APPROACH - [facility],  C-ARM, performed by Peter Webster DO at [address] [facility]  2008    oesophagitis, candidiasis    UPPER GASTROINTESTINAL ENDOSCOPY  11/05/2018    UPPER GASTROINTESTINAL ENDOSCOPY N/A 11/5/2018    EGD CONTROL HEMORRHAGE performed by Naida Rashid MD at [facility]       Medications Prior to Admission:   Prior to Admission medications    Medication Sig Start Date End Date Taking? Authorizing Provider   doxycycline hyclate (VIBRAMYCIN) 100 MG capsule Take 1 capsule by mouth 2 times daily for 10 days 8/28/19 9/7/19  Edna Zarco DO   apixaban (ELIQUIS) 2.5 MG TABS tablet Take 1 tablet by mouth 2 times daily 8/22/19 10/3/19  Doris García MD   docusate sodium (COLACE, DULCOLAX) 100 MG CAPS Take 100 mg by mouth 2 times daily 8/22/19   Doris García MD   polyethylene glycol [facility]) powder Take 17 g by mouth daily as needed (constipation) 8/22/19 9/21/19  Doris García MD   Misc.  Devices [facility]) MISC 1 Device by Does not apply route once for 1 dose 8/5/19 8/5/19  Dagoberto Duncan PA-C   hydrOXYzine (ATARAX) 25 MG tablet Take 1 tablet by mouth every 8 hours as needed for Itching 8/5/19   Dagoberto Duncan PA-C   acetaminophen (TYLENOL) 325 MG tablet Take 1 tablet by mouth every 6 hours as needed for Pain 6/22/19   Tasia Oleary DO   benzonatate (TESSALON PERLES) 100 MG capsule Take 1 capsule by mouth 3 times daily as needed for Cough 3/11/19   Dionna Lopez DO   carvedilol (COREG) 6.25 MG tablet Take 1 tablet by mouth 2 times daily (with meals)

## 2019-09-09 ENCOUNTER — HOSPITAL ENCOUNTER (EMERGENCY)
Age: 64
Discharge: HOME | End: 2019-09-09
Attending: EMERGENCY MEDICINE
Payer: MEDICARE

## 2019-09-09 VITALS
SYSTOLIC BLOOD PRESSURE: 181 MMHG | OXYGEN SATURATION: 99 % | TEMPERATURE: 98.6 F | DIASTOLIC BLOOD PRESSURE: 93 MMHG | RESPIRATION RATE: 18 BRPM | HEART RATE: 96 BPM

## 2019-09-09 DIAGNOSIS — R11.0 NAUSEA: ICD-10-CM

## 2019-09-09 DIAGNOSIS — R10.84 GENERALIZED ABDOMINAL PAIN: ICD-10-CM

## 2019-09-09 DIAGNOSIS — R19.7 DIARRHEA, UNSPECIFIED TYPE: ICD-10-CM

## 2019-09-09 DIAGNOSIS — R51.9 ACUTE NONINTRACTABLE HEADACHE, UNSPECIFIED HEADACHE TYPE: Primary | ICD-10-CM

## 2019-09-09 LAB
ABSOLUTE EOS #: 0.19 K/UL (ref 0–0.44)
ABSOLUTE IMMATURE GRANULOCYTE: 0.06 K/UL (ref 0–0.3)
ABSOLUTE LYMPH #: 1.18 K/UL (ref 1.1–3.7)
ABSOLUTE MONO #: 0.74 K/UL (ref 0.1–1.2)
ALBUMIN SERPL-MCNC: 4.6 G/DL (ref 3.5–5.2)
ALBUMIN/GLOBULIN RATIO: 0.9 (ref 1–2.5)
ALP BLD-CCNC: 139 U/L (ref 35–104)
ALT SERPL-CCNC: 15 U/L (ref 5–33)
ANION GAP SERPL CALCULATED.3IONS-SCNC: 20 MMOL/L (ref 9–17)
AST SERPL-CCNC: 44 U/L
BASOPHILS # BLD: 1 % (ref 0–2)
BASOPHILS ABSOLUTE: 0.06 K/UL (ref 0–0.2)
BILIRUB SERPL-MCNC: 1.05 MG/DL (ref 0.3–1.2)
BILIRUBIN DIRECT: 0.52 MG/DL
BILIRUBIN, INDIRECT: 0.53 MG/DL (ref 0–1)
BUN BLDV-MCNC: 3 MG/DL (ref 8–23)
BUN/CREAT BLD: ABNORMAL (ref 9–20)
CALCIUM SERPL-MCNC: 11 MG/DL (ref 8.6–10.4)
CHLORIDE BLD-SCNC: 100 MMOL/L (ref 98–107)
CO2: 23 MMOL/L (ref 20–31)
CREAT SERPL-MCNC: 0.51 MG/DL (ref 0.5–0.9)
DIFFERENTIAL TYPE: ABNORMAL
EOSINOPHILS RELATIVE PERCENT: 3 % (ref 1–4)
GFR AFRICAN AMERICAN: >60 ML/MIN
GFR NON-AFRICAN AMERICAN: >60 ML/MIN
GFR SERPL CREATININE-BSD FRML MDRD: ABNORMAL ML/MIN/{1.73_M2}
GFR SERPL CREATININE-BSD FRML MDRD: ABNORMAL ML/MIN/{1.73_M2}
GLOBULIN: ABNORMAL G/DL (ref 1.5–3.8)
GLUCOSE BLD-MCNC: 100 MG/DL (ref 70–99)
HCT VFR BLD CALC: 43.1 % (ref 36.3–47.1)
HEMOGLOBIN: 12.8 G/DL (ref 11.9–15.1)
IMMATURE GRANULOCYTES: 1 %
LACTIC ACID, SEPSIS WHOLE BLOOD: 1.4 MMOL/L (ref 0.5–1.9)
LACTIC ACID, SEPSIS: NORMAL MMOL/L (ref 0.5–1.9)
LIPASE: 36 U/L (ref 13–60)
LYMPHOCYTES # BLD: 19 % (ref 24–43)
MCH RBC QN AUTO: 26.6 PG (ref 25.2–33.5)
MCHC RBC AUTO-ENTMCNC: 29.7 G/DL (ref 28.4–34.8)
MCV RBC AUTO: 89.6 FL (ref 82.6–102.9)
MONOCYTES # BLD: 12 % (ref 3–12)
MORPHOLOGY: ABNORMAL
NRBC AUTOMATED: 0.3 PER 100 WBC
PDW BLD-RTO: 20.9 % (ref 11.8–14.4)
PLATELET # BLD: 620 K/UL (ref 138–453)
PLATELET ESTIMATE: ABNORMAL
PMV BLD AUTO: 9.8 FL (ref 8.1–13.5)
POTASSIUM SERPL-SCNC: 3.2 MMOL/L (ref 3.7–5.3)
RBC # BLD: 4.81 M/UL (ref 3.95–5.11)
RBC # BLD: ABNORMAL 10*6/UL
SEG NEUTROPHILS: 64 % (ref 36–65)
SEGMENTED NEUTROPHILS ABSOLUTE COUNT: 3.97 K/UL (ref 1.5–8.1)
SODIUM BLD-SCNC: 143 MMOL/L (ref 135–144)
TOTAL PROTEIN: 9.9 G/DL (ref 6.4–8.3)
WBC # BLD: 6.2 K/UL (ref 3.5–11.3)
WBC # BLD: ABNORMAL 10*3/UL

## 2019-09-09 PROCEDURE — 80048 BASIC METABOLIC PNL TOTAL CA: CPT

## 2019-09-09 PROCEDURE — 80076 HEPATIC FUNCTION PANEL: CPT

## 2019-09-09 PROCEDURE — 2580000003 HC RX 258: Performed by: PHYSICIAN ASSISTANT

## 2019-09-09 PROCEDURE — 6360000002 HC RX W HCPCS: Performed by: PHYSICIAN ASSISTANT

## 2019-09-09 PROCEDURE — 83605 ASSAY OF LACTIC ACID: CPT

## 2019-09-09 PROCEDURE — 83690 ASSAY OF LIPASE: CPT

## 2019-09-09 PROCEDURE — 6360000002 HC RX W HCPCS

## 2019-09-09 PROCEDURE — 85025 COMPLETE CBC W/AUTO DIFF WBC: CPT

## 2019-09-09 PROCEDURE — 96375 TX/PRO/DX INJ NEW DRUG ADDON: CPT

## 2019-09-09 PROCEDURE — 99283 EMERGENCY DEPT VISIT LOW MDM: CPT

## 2019-09-09 PROCEDURE — 2500000003 HC RX 250 WO HCPCS: Performed by: PHYSICIAN ASSISTANT

## 2019-09-09 PROCEDURE — 96374 THER/PROPH/DIAG INJ IV PUSH: CPT

## 2019-09-09 RX ORDER — ONDANSETRON 2 MG/ML
4 INJECTION INTRAMUSCULAR; INTRAVENOUS ONCE
Status: COMPLETED | OUTPATIENT
Start: 2019-09-09 | End: 2019-09-09

## 2019-09-09 RX ORDER — 0.9 % SODIUM CHLORIDE 0.9 %
1000 INTRAVENOUS SOLUTION INTRAVENOUS ONCE
Status: COMPLETED | OUTPATIENT
Start: 2019-09-09 | End: 2019-09-09

## 2019-09-09 RX ORDER — ONDANSETRON 4 MG/1
4 TABLET, ORALLY DISINTEGRATING ORAL EVERY 8 HOURS PRN
Qty: 6 TABLET | Refills: 0 | Status: SHIPPED | OUTPATIENT
Start: 2019-09-09 | End: 2021-03-28

## 2019-09-09 RX ORDER — PROCHLORPERAZINE EDISYLATE 5 MG/ML
10 INJECTION INTRAMUSCULAR; INTRAVENOUS ONCE
Status: COMPLETED | OUTPATIENT
Start: 2019-09-09 | End: 2019-09-09

## 2019-09-09 RX ORDER — ONDANSETRON 2 MG/ML
INJECTION INTRAMUSCULAR; INTRAVENOUS
Status: COMPLETED
Start: 2019-09-09 | End: 2019-09-09

## 2019-09-09 RX ORDER — DIPHENHYDRAMINE HYDROCHLORIDE 50 MG/ML
25 INJECTION INTRAMUSCULAR; INTRAVENOUS ONCE
Status: COMPLETED | OUTPATIENT
Start: 2019-09-09 | End: 2019-09-09

## 2019-09-09 RX ADMIN — ONDANSETRON HYDROCHLORIDE 4 MG: 2 INJECTION, SOLUTION INTRAMUSCULAR; INTRAVENOUS at 10:15

## 2019-09-09 RX ADMIN — PROCHLORPERAZINE EDISYLATE 10 MG: 5 INJECTION INTRAMUSCULAR; INTRAVENOUS at 10:15

## 2019-09-09 RX ADMIN — FAMOTIDINE 20 MG: 10 INJECTION, SOLUTION INTRAVENOUS at 09:45

## 2019-09-09 RX ADMIN — ONDANSETRON 4 MG: 2 INJECTION INTRAMUSCULAR; INTRAVENOUS at 10:15

## 2019-09-09 RX ADMIN — SODIUM CHLORIDE 1000 ML: 9 INJECTION, SOLUTION INTRAVENOUS at 09:45

## 2019-09-09 RX ADMIN — DIPHENHYDRAMINE HYDROCHLORIDE 25 MG: 50 INJECTION INTRAMUSCULAR; INTRAVENOUS at 09:45

## 2019-09-09 ASSESSMENT — ENCOUNTER SYMPTOMS
DIARRHEA: 1
SORE THROAT: 0
BACK PAIN: 0
VOMITING: 0
EYE DISCHARGE: 0
COUGH: 0
COLOR CHANGE: 0
NAUSEA: 1
ABDOMINAL PAIN: 1
RHINORRHEA: 0
WHEEZING: 0
EYE PAIN: 0
EYE ITCHING: 0

## 2019-09-09 NOTE — ED PROVIDER NOTES
mood and affect. Her behavior is normal.         PLAN (LABS / IMAGING / EKG):  Orders Placed This Encounter   Procedures    Urine Culture    Urinalysis    CBC Auto Differential    Lactate, Sepsis    Hepatic Function Panel    LIPASE    Basic Metabolic Panel    Vital signs    Insert peripheral IV       MEDICATIONS ORDERED:  Orders Placed This Encounter   Medications    famotidine (PEPCID) injection 20 mg    prochlorperazine (COMPAZINE) injection 10 mg    diphenhydrAMINE (BENADRYL) injection 25 mg    0.9 % sodium chloride bolus    ondansetron (ZOFRAN) 4 MG/2ML injection     LESLEE DUDLEY: cabinet override    ondansetron (ZOFRAN) injection 4 mg    ondansetron (ZOFRAN ODT) 4 MG disintegrating tablet     Sig: Take 1 tablet by mouth every 8 hours as needed for Nausea     Dispense:  6 tablet     Refill:  0       Controlled Substances Monitoring:      DIAGNOSTIC RESULTS / EMERGENCY DEPARTMENT COURSE / MDM     Patient with normal neurological examination. Reports that her headache is better after Compazine and Benadryl. Patient has Eliquis listed in her medication list but she states that she does not know if she is on it and according to the pharmacy they have not filled a prescription for. Patient has had headaches exactly like this in the past, it has gradually increased. Patient had good relief of her nausea with Zofran. Patient is stating that she feels better and is asking to be discharged.   I have encouraged patient to follow-up with orthopedics      ED Course as of Sep 09 1310   Mon Sep 09, 2019   2685 9:38 AM  Max Patrick states they have never filled Eliquis    [SARA]      ED Course User Index  [SARA] Richard Hill PA-C       RADIOLOGY:   I directly visualized (with the attending physician) the following  imagesand reviewed the radiologist interpretations:  Ct Abdomen Pelvis Wo Contrast Additional Contrast? None    Result Date: 8/20/2019  EXAMINATION: CT OF THE ABDOMEN AND PELVIS WITHOUT lesser trochanter FINDINGS: No acute fracture. Collapse and fragmentation of the right femoral head likely is related to avascular necrosis. Avascular necrosis within the left femoral head without collapse. No widening of the sacroiliac joints. Moderate left hip joint space narrowing. Stable lesion within the proximal femur. Stable lucencies within the lower lumbar spine and pelvis. No free fluid within the pelvis. No bladder calculus. Large chronic appearing right hip joint effusion. 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 Left femoral head avascular necrosis without significant collapse. Vl Dup Lower Extremity Venous Right    Result Date: 8/20/2019    [facility]  Vascular Lower Extremities DVT Study Procedure   Patient Name    ESTALEKS       Date of Study             08/20/2019                  EMRE CHAUDHRY   Date of Birth   1955  Gender                    Female   Age             75 year(s)  Race                      Black   Room Number     0236   Corporate ID #  [id number]   Patient Acct #  [de-identified]   MR #            [id number]     Sonographer               Beena Hazel RVT   Accession #     [id number]   Interpreting Physician    Mercedes Hernandez   Referring Nurse             Referring Physician       Amna Garces  Practitioner  Procedure Type of Study:   Veins: Lower Extremities DVT Study, Venous Scan Lower Right. Indications for Study:Pain. Patient Status: In Patient. Conclusions   Summary   Simultaneous real time imaging utilizing B-Mode, color doppler and  spectral waveform analysis was performed on the right lower extremity for  venous examination of the deep and superficial systems. Findings are:   Right:  No evidence of deep or superficial venous thrombosis.    Signature   ----------------------------------------------------------------  Electronically signed by Beena Hazel RVT(Sonographer) on  08/20/2019 06:03 PM ----------------------------------------------------------------   ----------------------------------------------------------------  Electronically signed by Thuan Villafuerte(Interpreting  physician) on 08/20/2019 06:16 PM  ----------------------------------------------------------------  Findings:   Right Impression:  The common femoral, femoral, popliteal and tibial veins demonstrate  normal compressibility and augmentation. Normal compressibility of the great saphenous vein. Normal compressibility of the small saphenous vein. Risk Factors History +------------------------------------------------------------+----+--------+ ! Diagnosis                                                   ! Date! Comments! +------------------------------------------------------------+----+--------+ ! Chronic lung disease->COPD                                  !    !        ! +------------------------------------------------------------+----+--------+ ! CHF                                                         !    !        ! +------------------------------------------------------------+----+--------+   - The patient's risk factor(s) include: dyslipidemia and arterial     hypertension.   - Current - Every day. Velocities are measured in cm/s ; Diameters are measured in cm Right Lower Extremities DVT Study Measurements Right 2D Measurements +------------------------------------+----------+---------------+----------+ ! Location                            ! Visualized! Compressibility! Thrombosis! +------------------------------------+----------+---------------+----------+ ! Common Femoral                      !Yes       ! Yes            ! None      ! +------------------------------------+----------+---------------+----------+ ! Prox Femoral                        !Yes       ! Yes            ! None      ! +------------------------------------+----------+---------------+----------+ ! Mid Femoral                         !Yes       ! Yes Morphology NOT REPORTED     Platelet Estimate NOT REPORTED     Immature Granulocytes 1 (H) 0 %    Seg Neutrophils 64 36 - 65 %    Lymphocytes 19 (L) 24 - 43 %    Monocytes 12 3 - 12 %    Eosinophils % 3 1 - 4 %    Basophils 1 0 - 2 %    Absolute Immature Granulocyte 0.06 0.00 - 0.30 k/uL    Segs Absolute 3.97 1.50 - 8.10 k/uL    Absolute Lymph # 1.18 1.10 - 3.70 k/uL    Absolute Mono # 0.74 0.10 - 1.20 k/uL    Absolute Eos # 0.19 0.00 - 0.44 k/uL    Basophils Absolute 0.06 0.00 - 0.20 k/uL    Morphology ANISOCYTOSIS PRESENT    Lactate, Sepsis   Result Value Ref Range    Lactic Acid, Sepsis NOT REPORTED 0.5 - 1.9 mmol/L    Lactic Acid, Sepsis, Whole Blood 1.4 0.5 - 1.9 mmol/L   Hepatic Function Panel   Result Value Ref Range    Alb 4.6 3.5 - 5.2 g/dL    Alkaline Phosphatase 139 (H) 35 - 104 U/L    ALT 15 5 - 33 U/L    AST 44 (H) <32 U/L    Total Bilirubin 1.05 0.3 - 1.2 mg/dL    Bilirubin, Direct 0.52 (H) <0.31 mg/dL    Bilirubin, Indirect 0.53 0.00 - 1.00 mg/dL    Total Protein 9.9 (H) 6.4 - 8.3 g/dL    Globulin NOT REPORTED 1.5 - 3.8 g/dL    Albumin/Globulin Ratio 0.9 (L) 1.0 - 2.5   LIPASE   Result Value Ref Range    Lipase 36 13 - 60 U/L   Basic Metabolic Panel   Result Value Ref Range    Glucose 100 (H) 70 - 99 mg/dL    BUN 3 (L) 8 - 23 mg/dL    CREATININE 0.51 0.50 - 0.90 mg/dL    Bun/Cre Ratio NOT REPORTED 9 - 20    Calcium 11.0 (H) 8.6 - 10.4 mg/dL    Sodium 143 135 - 144 mmol/L    Potassium 3.2 (L) 3.7 - 5.3 mmol/L    Chloride 100 98 - 107 mmol/L    CO2 23 20 - 31 mmol/L    Anion Gap 20 (H) 9 - 17 mmol/L    GFR Non-African American >60 >60 mL/min    GFR African American >60 >60 mL/min    GFR Comment          GFR Staging NOT REPORTED          CONSULTS:  None    PROCEDURES:  None    FINAL IMPRESSION      1. Acute nonintractable headache, unspecified headache type    2. Generalized abdominal pain    3. Nausea    4.  Diarrhea, unspecified type          DISPOSITION / PLAN     DISPOSITION Decision To

## 2019-09-12 ENCOUNTER — HOSPITAL ENCOUNTER (EMERGENCY)
Age: 64
Discharge: HOME | End: 2019-09-12
Attending: EMERGENCY MEDICINE
Payer: MEDICARE

## 2019-09-12 VITALS
TEMPERATURE: 98.2 F | RESPIRATION RATE: 16 BRPM | HEART RATE: 92 BPM | DIASTOLIC BLOOD PRESSURE: 94 MMHG | SYSTOLIC BLOOD PRESSURE: 152 MMHG | OXYGEN SATURATION: 100 %

## 2019-09-12 DIAGNOSIS — Z51.89 VISIT FOR WOUND CHECK: ICD-10-CM

## 2019-09-12 DIAGNOSIS — M87.051 AVASCULAR NECROSIS OF BONE OF RIGHT HIP (HCC): Primary | ICD-10-CM

## 2019-09-12 DIAGNOSIS — S72.051D CLOSED FRACTURE OF HEAD OF RIGHT FEMUR WITH ROUTINE HEALING, SUBSEQUENT ENCOUNTER: ICD-10-CM

## 2019-09-12 DIAGNOSIS — M25.551 RIGHT HIP PAIN: Primary | ICD-10-CM

## 2019-09-12 PROCEDURE — 99282 EMERGENCY DEPT VISIT SF MDM: CPT

## 2019-09-12 PROCEDURE — 6370000000 HC RX 637 (ALT 250 FOR IP): Performed by: EMERGENCY MEDICINE

## 2019-09-12 RX ORDER — ACETAMINOPHEN 500 MG
1000 TABLET ORAL ONCE
Status: DISCONTINUED | OUTPATIENT
Start: 2019-09-12 | End: 2019-09-12

## 2019-09-12 RX ORDER — IBUPROFEN 800 MG/1
800 TABLET ORAL ONCE
Status: COMPLETED | OUTPATIENT
Start: 2019-09-12 | End: 2019-09-12

## 2019-09-12 RX ADMIN — IBUPROFEN 800 MG: 800 TABLET, FILM COATED ORAL at 10:30

## 2019-09-12 ASSESSMENT — ENCOUNTER SYMPTOMS
NAUSEA: 0
COLOR CHANGE: 0
VOMITING: 0

## 2019-09-12 ASSESSMENT — PAIN SCALES - GENERAL
PAINLEVEL_OUTOF10: 6
PAINLEVEL_OUTOF10: 6

## 2019-09-12 ASSESSMENT — PAIN DESCRIPTION - PAIN TYPE: TYPE: ACUTE PAIN

## 2019-09-12 ASSESSMENT — PAIN DESCRIPTION - LOCATION: LOCATION: HIP

## 2019-09-12 ASSESSMENT — PAIN DESCRIPTION - ORIENTATION: ORIENTATION: RIGHT

## 2019-09-12 ASSESSMENT — PAIN DESCRIPTION - DESCRIPTORS: DESCRIPTORS: DISCOMFORT

## 2019-09-12 NOTE — CONSULTS
325 (65 Fe) MG tablet Take 1 tablet by mouth daily 2/7/19   Christi Ortega MD   melatonin 1 MG tablet Take 1 tablet by mouth nightly as needed for Sleep 1/11/19   Ary Medrano MD   ipratropium-albuterol (DUONEB) 0.5-2.5 (3) MG/3ML SOLN nebulizer solution Inhale 3 mLs into the lungs every 4 hours as needed for Shortness of Breath 1/11/19   Ary Medrano MD   mometasone-formoterol [facility]) 100-5 MCG/ACT inhaler Inhale 2 puffs into the lungs 2 times daily 12/5/18   Lorraine Lopez MD   albuterol sulfate HFA (PROVENTIL HFA) 108 (90 Base) MCG/ACT inhaler Inhale 1-2 puffs into the lungs every 4 hours as needed for Wheezing or Shortness of Breath (Space out to every 6 hours as symptoms improve) 12/5/18   Lorraine Lopez MD   pantoprazole (PROTONIX) 40 MG tablet Take 1 tablet by mouth 2 times daily (before meals) 11/13/18   Alferd Runner, MD   folic acid (FOLVITE) 1 MG tablet Take 1 tablet by mouth daily 10/13/18   Barney Bejarano MD   vitamin B-1 100 MG tablet Take 1 tablet by mouth daily 10/13/18   Barney Bejarano MD   vitamin B-12 250 MCG tablet Take 1 tablet by mouth daily 10/13/18   Barney Bejarano MD   gabapentin (NEURONTIN) 300 MG capsule Take 300 mg by mouth 3 times daily. Javier Stearns     Historical Provider, MD   Multiple Vitamins-Minerals (THERAPEUTIC MULTIVITAMIN-MINERALS) tablet Take 1 tablet by mouth daily 8/12/18   Lonnie Lemon MD       Allergies:    Aspirin and Keflet [cephalexin]    Social History:   Social History     Socioeconomic History    Marital status: Single     Spouse name: None    Number of children: None    Years of education: None    Highest education level: None   Occupational History    None   Social Needs    Financial resource strain: None    Food insecurity:     Worry: None     Inability: None    Transportation needs:     Medical: None     Non-medical: None   Tobacco Use    Smoking status: Former Smoker     Packs/day: 1.00     Years: 40.00     Pack years: 40.00     Types: Cigarettes

## 2019-09-12 NOTE — ED PROVIDER NOTES
MD  09/12/19 [id number] [facility] Trang Ho MD  09/12/19 9729
times daily 12/5/18   Earline Rodriguez MD   albuterol sulfate HFA (PROVENTIL HFA) 108 (90 Base) MCG/ACT inhaler Inhale 1-2 puffs into the lungs every 4 hours as needed for Wheezing or Shortness of Breath (Space out to every 6 hours as symptoms improve) 12/5/18   Earline Rodriguez MD   pantoprazole (PROTONIX) 40 MG tablet Take 1 tablet by mouth 2 times daily (before meals) 11/13/18   Shaun Montiel MD   folic acid (FOLVITE) 1 MG tablet Take 1 tablet by mouth daily 10/13/18   No Harris MD   vitamin B-1 100 MG tablet Take 1 tablet by mouth daily 10/13/18   No Harris MD   vitamin B-12 250 MCG tablet Take 1 tablet by mouth daily 10/13/18   No Harris MD   gabapentin (NEURONTIN) 300 MG capsule Take 300 mg by mouth 3 times daily. Gertrude Dye MD   Multiple Vitamins-Minerals (THERAPEUTIC MULTIVITAMIN-MINERALS) tablet Take 1 tablet by mouth daily 8/12/18   Rica Weaver MD       REVIEW OF SYSTEMS    (2-9 systems for level 4, 10 or more for level 5)    Review of Systems   Constitutional: Negative for chills, diaphoresis and fever. Gastrointestinal: Negative for nausea and vomiting. Musculoskeletal: Positive for arthralgias and myalgias. Skin: Negative for color change. PHYSICAL EXAM   (up to 7 for level 4, 8 or more for level 5)    VITALS:   Vitals:    09/12/19 1008   BP: (!) 152/94   Pulse: 92   Resp: 16   Temp: 98.2 °F (36.8 °C)   SpO2: 100%       Physical Exam   Constitutional: She appears well-developed and well-nourished. No distress. HENT:   Head: Normocephalic and atraumatic. Eyes: Conjunctivae are normal.   Neck: Normal range of motion. Cardiovascular: Normal rate and intact distal pulses. Pulmonary/Chest: Effort normal. No respiratory distress. Musculoskeletal: Normal range of motion. She exhibits no edema or tenderness. Right hip: She exhibits normal range of motion, no tenderness, no bony tenderness, no swelling and no crepitus.         Legs:  Neurological: She

## 2019-09-17 ENCOUNTER — TELEPHONE (OUTPATIENT)
Dept: ONCOLOGY | Age: 64
End: 2019-09-17

## 2019-09-18 ENCOUNTER — OFFICE VISIT (OUTPATIENT)
Dept: ORTHOPEDIC SURGERY | Age: 64
End: 2019-09-18

## 2019-09-18 VITALS — WEIGHT: 98.11 LBS | HEIGHT: 60 IN | BODY MASS INDEX: 19.26 KG/M2

## 2019-09-18 DIAGNOSIS — Z96.641 STATUS POST TOTAL REPLACEMENT OF RIGHT HIP: Primary | ICD-10-CM

## 2019-09-18 PROCEDURE — 99024 POSTOP FOLLOW-UP VISIT: CPT | Performed by: STUDENT IN AN ORGANIZED HEALTH CARE EDUCATION/TRAINING PROGRAM

## 2019-09-18 ASSESSMENT — ENCOUNTER SYMPTOMS
VOMITING: 0
NAUSEA: 0
SHORTNESS OF BREATH: 0

## 2019-09-19 ENCOUNTER — HOSPITAL ENCOUNTER (EMERGENCY)
Age: 64
Discharge: HOME | DRG: 282 | End: 2019-09-19
Attending: EMERGENCY MEDICINE
Payer: MEDICARE

## 2019-09-19 VITALS
RESPIRATION RATE: 20 BRPM | SYSTOLIC BLOOD PRESSURE: 161 MMHG | HEIGHT: 60 IN | DIASTOLIC BLOOD PRESSURE: 93 MMHG | WEIGHT: 89 LBS | HEART RATE: 105 BPM | OXYGEN SATURATION: 98 % | TEMPERATURE: 99.3 F | BODY MASS INDEX: 17.47 KG/M2

## 2019-09-19 DIAGNOSIS — G43.809 MIGRAINE VARIANTS, NOT INTRACTABLE: Primary | ICD-10-CM

## 2019-09-19 LAB
ABSOLUTE EOS #: 0.32 K/UL (ref 0–0.44)
ABSOLUTE IMMATURE GRANULOCYTE: 0.03 K/UL (ref 0–0.3)
ABSOLUTE LYMPH #: 1.62 K/UL (ref 1.1–3.7)
ABSOLUTE MONO #: 0.72 K/UL (ref 0.1–1.2)
ALBUMIN SERPL-MCNC: 4.1 G/DL (ref 3.5–5.2)
ALBUMIN/GLOBULIN RATIO: 1 (ref 1–2.5)
ALP BLD-CCNC: 101 U/L (ref 35–104)
ALT SERPL-CCNC: 13 U/L (ref 5–33)
ANION GAP SERPL CALCULATED.3IONS-SCNC: 16 MMOL/L (ref 9–17)
AST SERPL-CCNC: 26 U/L
BASOPHILS # BLD: 0 % (ref 0–2)
BASOPHILS ABSOLUTE: 0.03 K/UL (ref 0–0.2)
BILIRUB SERPL-MCNC: 0.51 MG/DL (ref 0.3–1.2)
BUN BLDV-MCNC: 9 MG/DL (ref 8–23)
BUN/CREAT BLD: ABNORMAL (ref 9–20)
CALCIUM SERPL-MCNC: 9.7 MG/DL (ref 8.6–10.4)
CHLORIDE BLD-SCNC: 105 MMOL/L (ref 98–107)
CO2: 23 MMOL/L (ref 20–31)
CREAT SERPL-MCNC: 0.52 MG/DL (ref 0.5–0.9)
DIFFERENTIAL TYPE: ABNORMAL
EOSINOPHILS RELATIVE PERCENT: 4 % (ref 1–4)
GFR AFRICAN AMERICAN: >60 ML/MIN
GFR NON-AFRICAN AMERICAN: >60 ML/MIN
GFR SERPL CREATININE-BSD FRML MDRD: ABNORMAL ML/MIN/{1.73_M2}
GFR SERPL CREATININE-BSD FRML MDRD: ABNORMAL ML/MIN/{1.73_M2}
GLUCOSE BLD-MCNC: 87 MG/DL (ref 70–99)
HCT VFR BLD CALC: 38.8 % (ref 36.3–47.1)
HEMOGLOBIN: 11.3 G/DL (ref 11.9–15.1)
IMMATURE GRANULOCYTES: 0 %
LIPASE: 102 U/L (ref 13–60)
LYMPHOCYTES # BLD: 20 % (ref 24–43)
MCH RBC QN AUTO: 26.4 PG (ref 25.2–33.5)
MCHC RBC AUTO-ENTMCNC: 29.1 G/DL (ref 28.4–34.8)
MCV RBC AUTO: 90.7 FL (ref 82.6–102.9)
MONOCYTES # BLD: 9 % (ref 3–12)
NRBC AUTOMATED: 0 PER 100 WBC
PDW BLD-RTO: 20.1 % (ref 11.8–14.4)
PLATELET # BLD: 609 K/UL (ref 138–453)
PLATELET ESTIMATE: ABNORMAL
PMV BLD AUTO: 9.3 FL (ref 8.1–13.5)
POTASSIUM SERPL-SCNC: 3.7 MMOL/L (ref 3.7–5.3)
RBC # BLD: 4.28 M/UL (ref 3.95–5.11)
RBC # BLD: ABNORMAL 10*6/UL
SEG NEUTROPHILS: 67 % (ref 36–65)
SEGMENTED NEUTROPHILS ABSOLUTE COUNT: 5.42 K/UL (ref 1.5–8.1)
SODIUM BLD-SCNC: 144 MMOL/L (ref 135–144)
TOTAL PROTEIN: 8.4 G/DL (ref 6.4–8.3)
WBC # BLD: 8.1 K/UL (ref 3.5–11.3)
WBC # BLD: ABNORMAL 10*3/UL

## 2019-09-19 PROCEDURE — 85025 COMPLETE CBC W/AUTO DIFF WBC: CPT

## 2019-09-19 PROCEDURE — 6370000000 HC RX 637 (ALT 250 FOR IP): Performed by: STUDENT IN AN ORGANIZED HEALTH CARE EDUCATION/TRAINING PROGRAM

## 2019-09-19 PROCEDURE — 80053 COMPREHEN METABOLIC PANEL: CPT

## 2019-09-19 PROCEDURE — 99284 EMERGENCY DEPT VISIT MOD MDM: CPT

## 2019-09-19 PROCEDURE — 96374 THER/PROPH/DIAG INJ IV PUSH: CPT

## 2019-09-19 PROCEDURE — 2580000003 HC RX 258: Performed by: STUDENT IN AN ORGANIZED HEALTH CARE EDUCATION/TRAINING PROGRAM

## 2019-09-19 PROCEDURE — 83690 ASSAY OF LIPASE: CPT

## 2019-09-19 PROCEDURE — 6360000002 HC RX W HCPCS: Performed by: STUDENT IN AN ORGANIZED HEALTH CARE EDUCATION/TRAINING PROGRAM

## 2019-09-19 RX ORDER — 0.9 % SODIUM CHLORIDE 0.9 %
500 INTRAVENOUS SOLUTION INTRAVENOUS ONCE
Status: COMPLETED | OUTPATIENT
Start: 2019-09-19 | End: 2019-09-19

## 2019-09-19 RX ORDER — PROCHLORPERAZINE EDISYLATE 5 MG/ML
10 INJECTION INTRAMUSCULAR; INTRAVENOUS ONCE
Status: COMPLETED | OUTPATIENT
Start: 2019-09-19 | End: 2019-09-19

## 2019-09-19 RX ORDER — DIPHENHYDRAMINE HCL 25 MG
25 TABLET ORAL ONCE
Status: COMPLETED | OUTPATIENT
Start: 2019-09-19 | End: 2019-09-19

## 2019-09-19 RX ADMIN — SODIUM CHLORIDE: 9 INJECTION, SOLUTION INTRAVENOUS at 13:08

## 2019-09-19 RX ADMIN — DIPHENHYDRAMINE HCL 25 MG: 25 TABLET ORAL at 13:08

## 2019-09-19 RX ADMIN — PROCHLORPERAZINE EDISYLATE 10 MG: 5 INJECTION INTRAMUSCULAR; INTRAVENOUS at 13:08

## 2019-09-19 ASSESSMENT — PAIN DESCRIPTION - FREQUENCY: FREQUENCY: CONTINUOUS

## 2019-09-19 ASSESSMENT — PAIN DESCRIPTION - PAIN TYPE: TYPE: ACUTE PAIN

## 2019-09-19 ASSESSMENT — PAIN DESCRIPTION - ONSET: ONSET: ON-GOING

## 2019-09-19 ASSESSMENT — PAIN DESCRIPTION - LOCATION: LOCATION: HEAD

## 2019-09-19 ASSESSMENT — PAIN DESCRIPTION - DESCRIPTORS: DESCRIPTORS: SHARP

## 2019-09-19 ASSESSMENT — PAIN SCALES - GENERAL: PAINLEVEL_OUTOF10: 8

## 2019-09-19 NOTE — ED PROVIDER NOTES
allergies, medications, social and family history as documented unless otherwise noted below. Documentation of the HPI, Physical Exam and Medical Decision Making performed by medical students or scribes is based on my personal performance of the HPI, PE and MDM. For Phys Assistant/ Nurse Practitioner cases/documentation I have had a face to face evaluation of this patient and have completed at least one if not all key elements of the E/M (history, physical exam, and MDM). Additional findings are as noted. For APC cases I have personally evaluated and examined the patient in conjunction with the APC and agree with the treatment plan and disposition of the patient as recorded by the APC.     Tanya Peters MD  Attending Emergency  Physician       Jacqueline Pedraza MD  09/19/19 0515

## 2019-09-19 NOTE — ED NOTES
Bed: 40  Expected date:   Expected time:   Means of arrival:   Comments:     Silvia Chapman RN  09/19/19 2135

## 2019-09-20 ASSESSMENT — ENCOUNTER SYMPTOMS
BLOOD IN STOOL: 0
NAUSEA: 1
VOMITING: 0
SORE THROAT: 0
PHOTOPHOBIA: 1
ABDOMINAL PAIN: 1
SHORTNESS OF BREATH: 0
BACK PAIN: 0

## 2019-09-20 NOTE — ED PROVIDER NOTES
endoscopy (N/A, 2018); pr colonoscopy w/biopsy single/multiple (N/A, 11/10/2018); pr egd transoral biopsy single/multiple (N/A, 2018); hip surgery (Right, 2019); and Total hip arthroplasty (Right, 2019).      Social History     Socioeconomic History    Marital status: Single     Spouse name: Not on file    Number of children: Not on file    Years of education: Not on file    Highest education level: Not on file   Occupational History    Not on file   Social Needs    Financial resource strain: Not on file    Food insecurity:     Worry: Not on file     Inability: Not on file    Transportation needs:     Medical: Not on file     Non-medical: Not on file   Tobacco Use    Smoking status: Former Smoker     Packs/day: 1.00     Years: 40.00     Pack years: 40.00     Types: Cigarettes     Start date:      Last attempt to quit: 2018     Years since quittin.8    Smokeless tobacco: Never Used   Substance and Sexual Activity    Alcohol use: No     Alcohol/week: 0.0 standard drinks    Drug use: No     Types: IV, Cocaine     Comment: past history    Sexual activity: Not on file   Lifestyle    Physical activity:     Days per week: Not on file     Minutes per session: Not on file    Stress: Not on file   Relationships    Social connections:     Talks on phone: Not on file     Gets together: Not on file     Attends Holiness service: Not on file     Active member of club or organization: Not on file     Attends meetings of clubs or organizations: Not on file     Relationship status: Not on file    Intimate partner violence:     Fear of current or ex partner: Not on file     Emotionally abused: Not on file     Physically abused: Not on file     Forced sexual activity: Not on file   Other Topics Concern    Not on file   Social History Narrative    Not on file       Family History   Problem Relation Age of Onset    Breast Cancer Mother     Heart Disease Father         Allergies: Aspirin and Keflet [cephalexin]    Home Medications:  Prior to Admission medications    Medication Sig Start Date End Date Taking? Authorizing Provider   ondansetron (ZOFRAN ODT) 4 MG disintegrating tablet Take 1 tablet by mouth every 8 hours as needed for Nausea 9/9/19   Romina Kahn PA-C   apixaban (ELIQUIS) 2.5 MG TABS tablet Take 1 tablet by mouth 2 times daily 8/22/19 10/3/19  Lazaro Flores MD   docusate sodium (COLACE, DULCOLAX) 100 MG CAPS Take 100 mg by mouth 2 times daily 8/22/19   Lazaro Flores MD   polyethylene glycol [facility]) powder Take 17 g by mouth daily as needed (constipation) 8/22/19 9/21/19  Lazaro Flores MD   [facility].  Devices [facility]) MISC 1 Device by Does not apply route once for 1 dose 8/5/19 8/5/19  Romina Kahn PA-C   hydrOXYzine (ATARAX) 25 MG tablet Take 1 tablet by mouth every 8 hours as needed for Itching 8/5/19   Romina Kahn PA-C   acetaminophen (TYLENOL) 325 MG tablet Take 1 tablet by mouth every 6 hours as needed for Pain 6/22/19   Basilia Rosenbaum DO   benzonatate (TESSALON PERLES) 100 MG capsule Take 1 capsule by mouth 3 times daily as needed for Cough 3/11/19   Ubaldo Lopez DO   carvedilol (COREG) 6.25 MG tablet Take 1 tablet by mouth 2 times daily (with meals) 2/7/19   Brittni Roth MD   ferrous sulfate (MELISSA-HAO) 325 (65 Fe) MG tablet Take 1 tablet by mouth daily 2/7/19   Brittni Roth MD   melatonin 1 MG tablet Take 1 tablet by mouth nightly as needed for Sleep 1/11/19   Valentino Bolden MD   ipratropium-albuterol (DUONEB) 0.5-2.5 (3) MG/3ML SOLN nebulizer solution Inhale 3 mLs into the lungs every 4 hours as needed for Shortness of Breath 1/11/19   Valentino Bolden MD   mometasone-formoterol [facility]) 100-5 MCG/ACT inhaler Inhale 2 puffs into the lungs 2 times daily 12/5/18 [address] LESLY MD   albuterol sulfate HFA (PROVENTIL HFA) 108 (90 Base) MCG/ACT inhaler Inhale 1-2 puffs into the lungs every 4 hours as needed for Wheezing or Shortness of Breath (Space out Result Value    Hemoglobin 11.3 (*)     RDW 20.1 (*)     Platelets 885 (*)     Seg Neutrophils 67 (*)     Lymphocytes 20 (*)     All other components within normal limits   COMPREHENSIVE METABOLIC PANEL - Abnormal; Notable for the following components: Total Protein 8.4 (*)     All other components within normal limits   LIPASE - Abnormal; Notable for the following components:    Lipase 102 (*)     All other components within normal limits         RADIOLOGY:  No results found. EKG      All EKG's are interpreted by the Emergency Department Physicianwho either signs or Co-signs this chart in the absence of a cardiologist.    EMERGENCY DEPARTMENT COURSE:      Patient was requesting to leave after receiving fluids, Compazine and Benadryl. She reported improvement in her headache. She was insistent on leaving. Patient was informed that the lab work was not back yet. Attending physician discussed risks and benefits of leaving. She left AMA. PROCEDURES:  None    CONSULTS:  None    CRITICAL CARE:  Please see attending note    FINAL IMPRESSION      1.  Migraine variants, not intractable          DISPOSITION / PLAN     DISPOSITION [place] 09/19/2019 01:43:27 PM      PATIENT REFERRED TO:  [facility] ED  [address] [facility] [address]  [phone]  Go to   If symptoms worsen      DISCHARGE MEDICATIONS:  Discharge Medication List as of 9/19/2019  1:38 PM          Philly Beckham MD  Emergency Medicine Resident    (Please note that portions of this note were completed with a voice recognition program.Efforts were made to edit the dictations but occasionally words are mis-transcribed.)        Philly Beckham MD  Resident  09/20/19 5351

## 2019-09-21 ENCOUNTER — APPOINTMENT (OUTPATIENT)
Dept: CT IMAGING | Age: 64
DRG: 282 | End: 2019-09-21
Payer: MEDICARE

## 2019-09-21 ENCOUNTER — HOSPITAL ENCOUNTER (INPATIENT)
Age: 64
LOS: 1 days | Discharge: LEFT BEFORE DISCHARGE | DRG: 282 | End: 2019-09-22
Attending: EMERGENCY MEDICINE | Admitting: INTERNAL MEDICINE
Payer: MEDICARE

## 2019-09-21 DIAGNOSIS — K85.90 ACUTE PANCREATITIS, UNSPECIFIED COMPLICATION STATUS, UNSPECIFIED PANCREATITIS TYPE: Primary | ICD-10-CM

## 2019-09-21 LAB
ABSOLUTE EOS #: 0.24 K/UL (ref 0–0.44)
ABSOLUTE IMMATURE GRANULOCYTE: 0.05 K/UL (ref 0–0.3)
ABSOLUTE LYMPH #: 1.89 K/UL (ref 1.1–3.7)
ABSOLUTE MONO #: 0.96 K/UL (ref 0.1–1.2)
ALBUMIN SERPL-MCNC: 3.9 G/DL (ref 3.5–5.2)
ALBUMIN/GLOBULIN RATIO: 1 (ref 1–2.5)
ALP BLD-CCNC: 93 U/L (ref 35–104)
ALT SERPL-CCNC: 15 U/L (ref 5–33)
ANION GAP SERPL CALCULATED.3IONS-SCNC: 15 MMOL/L (ref 9–17)
AST SERPL-CCNC: 29 U/L
BASOPHILS # BLD: 0 % (ref 0–2)
BASOPHILS ABSOLUTE: 0.03 K/UL (ref 0–0.2)
BILIRUB SERPL-MCNC: 0.54 MG/DL (ref 0.3–1.2)
BILIRUBIN URINE: NEGATIVE
BUN BLDV-MCNC: 12 MG/DL (ref 8–23)
BUN/CREAT BLD: ABNORMAL (ref 9–20)
CALCIUM SERPL-MCNC: 10.6 MG/DL (ref 8.6–10.4)
CHLORIDE BLD-SCNC: 100 MMOL/L (ref 98–107)
CO2: 23 MMOL/L (ref 20–31)
COLOR: YELLOW
COMMENT UA: NORMAL
CREAT SERPL-MCNC: 0.96 MG/DL (ref 0.5–0.9)
D-DIMER QUANTITATIVE: 2.66 MG/L FEU
DIFFERENTIAL TYPE: ABNORMAL
EOSINOPHILS RELATIVE PERCENT: 3 % (ref 1–4)
GFR AFRICAN AMERICAN: >60 ML/MIN
GFR NON-AFRICAN AMERICAN: 58 ML/MIN
GFR SERPL CREATININE-BSD FRML MDRD: ABNORMAL ML/MIN/{1.73_M2}
GFR SERPL CREATININE-BSD FRML MDRD: ABNORMAL ML/MIN/{1.73_M2}
GLUCOSE BLD-MCNC: 97 MG/DL (ref 70–99)
GLUCOSE URINE: NEGATIVE
HCT VFR BLD CALC: 37.5 % (ref 36.3–47.1)
HEMOGLOBIN: 11.2 G/DL (ref 11.9–15.1)
IMMATURE GRANULOCYTES: 1 %
KETONES, URINE: NEGATIVE
LACTIC ACID, SEPSIS WHOLE BLOOD: 1.2 MMOL/L (ref 0.5–1.9)
LACTIC ACID, SEPSIS: NORMAL MMOL/L (ref 0.5–1.9)
LEUKOCYTE ESTERASE, URINE: NEGATIVE
LIPASE: 185 U/L (ref 13–60)
LYMPHOCYTES # BLD: 24 % (ref 24–43)
MCH RBC QN AUTO: 26.6 PG (ref 25.2–33.5)
MCHC RBC AUTO-ENTMCNC: 29.9 G/DL (ref 28.4–34.8)
MCV RBC AUTO: 89.1 FL (ref 82.6–102.9)
MONOCYTES # BLD: 12 % (ref 3–12)
NITRITE, URINE: NEGATIVE
NRBC AUTOMATED: 0 PER 100 WBC
PDW BLD-RTO: 19.5 % (ref 11.8–14.4)
PH UA: 7 (ref 5–8)
PLATELET # BLD: 716 K/UL (ref 138–453)
PLATELET ESTIMATE: ABNORMAL
PMV BLD AUTO: 9.6 FL (ref 8.1–13.5)
POTASSIUM SERPL-SCNC: 3.5 MMOL/L (ref 3.7–5.3)
PROTEIN UA: NEGATIVE
RBC # BLD: 4.21 M/UL (ref 3.95–5.11)
RBC # BLD: ABNORMAL 10*6/UL
SEG NEUTROPHILS: 60 % (ref 36–65)
SEGMENTED NEUTROPHILS ABSOLUTE COUNT: 4.68 K/UL (ref 1.5–8.1)
SODIUM BLD-SCNC: 138 MMOL/L (ref 135–144)
SPECIFIC GRAVITY UA: 1.03 (ref 1–1.03)
TOTAL PROTEIN: 7.9 G/DL (ref 6.4–8.3)
TROPONIN INTERP: ABNORMAL
TROPONIN INTERP: ABNORMAL
TROPONIN T: ABNORMAL NG/ML
TROPONIN T: ABNORMAL NG/ML
TROPONIN, HIGH SENSITIVITY: 16 NG/L (ref 0–14)
TROPONIN, HIGH SENSITIVITY: 19 NG/L (ref 0–14)
TURBIDITY: CLEAR
URINE HGB: NEGATIVE
UROBILINOGEN, URINE: NORMAL
WBC # BLD: 7.9 K/UL (ref 3.5–11.3)
WBC # BLD: ABNORMAL 10*3/UL

## 2019-09-21 PROCEDURE — 2580000003 HC RX 258: Performed by: NURSE PRACTITIONER

## 2019-09-21 PROCEDURE — 6360000002 HC RX W HCPCS: Performed by: EMERGENCY MEDICINE

## 2019-09-21 PROCEDURE — 85025 COMPLETE CBC W/AUTO DIFF WBC: CPT

## 2019-09-21 PROCEDURE — 71260 CT THORAX DX C+: CPT

## 2019-09-21 PROCEDURE — 2580000003 HC RX 258: Performed by: EMERGENCY MEDICINE

## 2019-09-21 PROCEDURE — 6370000000 HC RX 637 (ALT 250 FOR IP): Performed by: NURSE PRACTITIONER

## 2019-09-21 PROCEDURE — 83690 ASSAY OF LIPASE: CPT

## 2019-09-21 PROCEDURE — 99222 1ST HOSP IP/OBS MODERATE 55: CPT | Performed by: NURSE PRACTITIONER

## 2019-09-21 PROCEDURE — 84484 ASSAY OF TROPONIN QUANT: CPT

## 2019-09-21 PROCEDURE — 74177 CT ABD & PELVIS W/CONTRAST: CPT

## 2019-09-21 PROCEDURE — 6360000004 HC RX CONTRAST MEDICATION: Performed by: EMERGENCY MEDICINE

## 2019-09-21 PROCEDURE — 85379 FIBRIN DEGRADATION QUANT: CPT

## 2019-09-21 PROCEDURE — 80053 COMPREHEN METABOLIC PANEL: CPT

## 2019-09-21 PROCEDURE — 6360000002 HC RX W HCPCS: Performed by: NURSE PRACTITIONER

## 2019-09-21 PROCEDURE — 96374 THER/PROPH/DIAG INJ IV PUSH: CPT

## 2019-09-21 PROCEDURE — 93005 ELECTROCARDIOGRAM TRACING: CPT | Performed by: EMERGENCY MEDICINE

## 2019-09-21 PROCEDURE — 2500000003 HC RX 250 WO HCPCS: Performed by: EMERGENCY MEDICINE

## 2019-09-21 PROCEDURE — 83605 ASSAY OF LACTIC ACID: CPT

## 2019-09-21 PROCEDURE — 81003 URINALYSIS AUTO W/O SCOPE: CPT

## 2019-09-21 PROCEDURE — 96375 TX/PRO/DX INJ NEW DRUG ADDON: CPT

## 2019-09-21 PROCEDURE — 1200000000 HC SEMI PRIVATE

## 2019-09-21 PROCEDURE — 99285 EMERGENCY DEPT VISIT HI MDM: CPT

## 2019-09-21 RX ORDER — LANOLIN ALCOHOL/MO/W.PET/CERES
325 CREAM (GRAM) TOPICAL DAILY
Status: DISCONTINUED | OUTPATIENT
Start: 2019-09-22 | End: 2019-09-22 | Stop reason: HOSPADM

## 2019-09-21 RX ORDER — GABAPENTIN 300 MG/1
300 CAPSULE ORAL 3 TIMES DAILY
Status: DISCONTINUED | OUTPATIENT
Start: 2019-09-21 | End: 2019-09-22 | Stop reason: HOSPADM

## 2019-09-21 RX ORDER — DOCUSATE SODIUM 100 MG/1
100 CAPSULE, LIQUID FILLED ORAL 2 TIMES DAILY
Status: DISCONTINUED | OUTPATIENT
Start: 2019-09-21 | End: 2019-09-22

## 2019-09-21 RX ORDER — CARVEDILOL 6.25 MG/1
6.25 TABLET ORAL 2 TIMES DAILY WITH MEALS
Status: DISCONTINUED | OUTPATIENT
Start: 2019-09-22 | End: 2019-09-22 | Stop reason: HOSPADM

## 2019-09-21 RX ORDER — ACETAMINOPHEN 325 MG/1
650 TABLET ORAL EVERY 4 HOURS PRN
Status: DISCONTINUED | OUTPATIENT
Start: 2019-09-21 | End: 2019-09-22 | Stop reason: HOSPADM

## 2019-09-21 RX ORDER — HYDROCODONE BITARTRATE AND ACETAMINOPHEN 5; 325 MG/1; MG/1
2 TABLET ORAL EVERY 4 HOURS PRN
Status: DISCONTINUED | OUTPATIENT
Start: 2019-09-21 | End: 2019-09-22 | Stop reason: HOSPADM

## 2019-09-21 RX ORDER — M-VIT,TX,IRON,MINS/CALC/FOLIC 27MG-0.4MG
1 TABLET ORAL DAILY
Status: DISCONTINUED | OUTPATIENT
Start: 2019-09-22 | End: 2019-09-22 | Stop reason: HOSPADM

## 2019-09-21 RX ORDER — FOLIC ACID 1 MG/1
1 TABLET ORAL DAILY
Status: DISCONTINUED | OUTPATIENT
Start: 2019-09-22 | End: 2019-09-22 | Stop reason: HOSPADM

## 2019-09-21 RX ORDER — POTASSIUM CHLORIDE 7.45 MG/ML
10 INJECTION INTRAVENOUS PRN
Status: DISCONTINUED | OUTPATIENT
Start: 2019-09-21 | End: 2019-09-22 | Stop reason: HOSPADM

## 2019-09-21 RX ORDER — 0.9 % SODIUM CHLORIDE 0.9 %
1000 INTRAVENOUS SOLUTION INTRAVENOUS ONCE
Status: COMPLETED | OUTPATIENT
Start: 2019-09-21 | End: 2019-09-21

## 2019-09-21 RX ORDER — PANTOPRAZOLE SODIUM 40 MG/1
40 TABLET, DELAYED RELEASE ORAL
Status: DISCONTINUED | OUTPATIENT
Start: 2019-09-22 | End: 2019-09-22 | Stop reason: HOSPADM

## 2019-09-21 RX ORDER — SODIUM CHLORIDE 9 MG/ML
INJECTION, SOLUTION INTRAVENOUS CONTINUOUS
Status: DISCONTINUED | OUTPATIENT
Start: 2019-09-21 | End: 2019-09-22 | Stop reason: HOSPADM

## 2019-09-21 RX ORDER — UREA 10 %
1 LOTION (ML) TOPICAL NIGHTLY PRN
Status: DISCONTINUED | OUTPATIENT
Start: 2019-09-22 | End: 2019-09-22 | Stop reason: HOSPADM

## 2019-09-21 RX ORDER — CHOLECALCIFEROL (VITAMIN D3) 125 MCG
250 CAPSULE ORAL DAILY
Status: DISCONTINUED | OUTPATIENT
Start: 2019-09-22 | End: 2019-09-22 | Stop reason: HOSPADM

## 2019-09-21 RX ORDER — SODIUM CHLORIDE 0.9 % (FLUSH) 0.9 %
10 SYRINGE (ML) INJECTION EVERY 12 HOURS SCHEDULED
Status: DISCONTINUED | OUTPATIENT
Start: 2019-09-21 | End: 2019-09-22 | Stop reason: HOSPADM

## 2019-09-21 RX ORDER — POLYETHYLENE GLYCOL 3350 17 G/17G
17 POWDER, FOR SOLUTION ORAL DAILY PRN
Status: DISCONTINUED | OUTPATIENT
Start: 2019-09-21 | End: 2019-09-22 | Stop reason: HOSPADM

## 2019-09-21 RX ORDER — SODIUM CHLORIDE 0.9 % (FLUSH) 0.9 %
10 SYRINGE (ML) INJECTION PRN
Status: DISCONTINUED | OUTPATIENT
Start: 2019-09-21 | End: 2019-09-22 | Stop reason: HOSPADM

## 2019-09-21 RX ORDER — MAGNESIUM SULFATE 1 G/100ML
1 INJECTION INTRAVENOUS PRN
Status: DISCONTINUED | OUTPATIENT
Start: 2019-09-21 | End: 2019-09-22 | Stop reason: HOSPADM

## 2019-09-21 RX ORDER — BENZONATATE 100 MG/1
100 CAPSULE ORAL 3 TIMES DAILY PRN
Status: DISCONTINUED | OUTPATIENT
Start: 2019-09-21 | End: 2019-09-22 | Stop reason: HOSPADM

## 2019-09-21 RX ORDER — HYDROXYZINE HYDROCHLORIDE 25 MG/1
25 TABLET, FILM COATED ORAL EVERY 8 HOURS PRN
Status: DISCONTINUED | OUTPATIENT
Start: 2019-09-21 | End: 2019-09-22 | Stop reason: HOSPADM

## 2019-09-21 RX ORDER — ALBUTEROL SULFATE 2.5 MG/3ML
2.5 SOLUTION RESPIRATORY (INHALATION) EVERY 4 HOURS PRN
Status: DISCONTINUED | OUTPATIENT
Start: 2019-09-21 | End: 2019-09-22 | Stop reason: HOSPADM

## 2019-09-21 RX ORDER — ALBUTEROL SULFATE 2.5 MG/3ML
2.5 SOLUTION RESPIRATORY (INHALATION)
Status: DISCONTINUED | OUTPATIENT
Start: 2019-09-21 | End: 2019-09-21

## 2019-09-21 RX ORDER — THIAMINE MONONITRATE (VIT B1) 100 MG
100 TABLET ORAL DAILY
Status: DISCONTINUED | OUTPATIENT
Start: 2019-09-22 | End: 2019-09-22 | Stop reason: HOSPADM

## 2019-09-21 RX ORDER — ONDANSETRON 2 MG/ML
4 INJECTION INTRAMUSCULAR; INTRAVENOUS EVERY 6 HOURS PRN
Status: DISCONTINUED | OUTPATIENT
Start: 2019-09-21 | End: 2019-09-22 | Stop reason: HOSPADM

## 2019-09-21 RX ORDER — HYDROCODONE BITARTRATE AND ACETAMINOPHEN 5; 325 MG/1; MG/1
1 TABLET ORAL EVERY 4 HOURS PRN
Status: DISCONTINUED | OUTPATIENT
Start: 2019-09-21 | End: 2019-09-22 | Stop reason: HOSPADM

## 2019-09-21 RX ORDER — ONDANSETRON 2 MG/ML
4 INJECTION INTRAMUSCULAR; INTRAVENOUS ONCE
Status: COMPLETED | OUTPATIENT
Start: 2019-09-21 | End: 2019-09-21

## 2019-09-21 RX ORDER — FENTANYL CITRATE 50 UG/ML
25 INJECTION, SOLUTION INTRAMUSCULAR; INTRAVENOUS ONCE
Status: COMPLETED | OUTPATIENT
Start: 2019-09-21 | End: 2019-09-21

## 2019-09-21 RX ADMIN — APIXABAN 2.5 MG: 2.5 TABLET, FILM COATED ORAL at 23:41

## 2019-09-21 RX ADMIN — ONDANSETRON 4 MG: 2 INJECTION INTRAMUSCULAR; INTRAVENOUS at 19:17

## 2019-09-21 RX ADMIN — IOVERSOL 75 ML: 741 INJECTION INTRA-ARTERIAL; INTRAVENOUS at 20:15

## 2019-09-21 RX ADMIN — HYDROXYZINE HYDROCHLORIDE 25 MG: 25 TABLET ORAL at 23:40

## 2019-09-21 RX ADMIN — FAMOTIDINE 20 MG: 10 INJECTION, SOLUTION INTRAVENOUS at 19:17

## 2019-09-21 RX ADMIN — SODIUM CHLORIDE: 9 INJECTION, SOLUTION INTRAVENOUS at 22:55

## 2019-09-21 RX ADMIN — FENTANYL CITRATE 25 MCG: 50 INJECTION, SOLUTION INTRAMUSCULAR; INTRAVENOUS at 19:18

## 2019-09-21 RX ADMIN — SODIUM CHLORIDE 1000 ML: 9 INJECTION, SOLUTION INTRAVENOUS at 19:17

## 2019-09-21 RX ADMIN — HYDROMORPHONE HYDROCHLORIDE 0.5 MG: 1 INJECTION, SOLUTION INTRAMUSCULAR; INTRAVENOUS; SUBCUTANEOUS at 22:55

## 2019-09-21 ASSESSMENT — PAIN DESCRIPTION - ORIENTATION: ORIENTATION: LEFT;RIGHT;MID;LOWER;UPPER

## 2019-09-21 ASSESSMENT — PAIN SCALES - GENERAL
PAINLEVEL_OUTOF10: 5
PAINLEVEL_OUTOF10: 8
PAINLEVEL_OUTOF10: 8
PAINLEVEL_OUTOF10: 10

## 2019-09-21 ASSESSMENT — PAIN DESCRIPTION - DESCRIPTORS: DESCRIPTORS: DISCOMFORT

## 2019-09-21 ASSESSMENT — ENCOUNTER SYMPTOMS
SHORTNESS OF BREATH: 1
ABDOMINAL DISTENTION: 0
BACK PAIN: 0
ABDOMINAL PAIN: 1
BLOOD IN STOOL: 0
SORE THROAT: 0
NAUSEA: 1
VOMITING: 0
DIARRHEA: 1

## 2019-09-21 ASSESSMENT — PAIN DESCRIPTION - LOCATION: LOCATION: ABDOMEN

## 2019-09-21 ASSESSMENT — PAIN DESCRIPTION - PAIN TYPE: TYPE: ACUTE PAIN

## 2019-09-21 NOTE — ED PROVIDER NOTES
Tobacco cessation education    Place intermittent pneumatic compression device    Full Code    Inpatient consult to Hospitalist    Consult to GI    OT eval and treat    PT evaluation and treat    Initiate Oxygen Therapy Protocol    Pulse Oximetry Spot Check    Initiate RT Protocol    Respiratory care evaluation only    HHN Treatment    EKG 12 Lead    PATIENT STATUS (FROM ED OR OR/PROCEDURAL) Inpatient       MEDICATIONS ORDERED:  Orders Placed This Encounter   Medications    ondansetron (ZOFRAN) injection 4 mg    0.9 % sodium chloride bolus    famotidine (PEPCID) injection 20 mg    fentaNYL (SUBLIMAZE) injection 25 mcg    ioversol (OPTIRAY) 74 % injection 75 mL    apixaban (ELIQUIS) tablet 2.5 mg    benzonatate (TESSALON) capsule 100 mg    carvedilol (COREG) tablet 6.25 mg    docusate sodium (COLACE) capsule 100 mg    ferrous sulfate EC tablet 632 mg    folic acid (FOLVITE) tablet 1 mg    gabapentin (NEURONTIN) capsule 300 mg    hydrOXYzine (ATARAX) tablet 25 mg    melatonin tablet 1 mg    mometasone-formoterol (DULERA) 100-5 MCG/ACT inhaler 2 puff    therapeutic multivitamin-minerals 1 tablet    pantoprazole (PROTONIX) tablet 40 mg    polyethylene glycol (GLYCOLAX) packet 17 g    vitamin B-1 (THIAMINE) tablet 100 mg    vitamin B-12 (CYANOCOBALAMIN) tablet 250 mcg    0.9 % sodium chloride infusion    sodium chloride flush 0.9 % injection 10 mL    sodium chloride flush 0.9 % injection 10 mL    potassium chloride 10 mEq/100 mL IVPB (Peripheral Line)    magnesium sulfate 1 g in dextrose 5% 100 mL IVPB    acetaminophen (TYLENOL) tablet 650 mg    OR Linked Order Group     HYDROcodone-acetaminophen (NORCO) 5-325 MG per tablet 1 tablet     HYDROcodone-acetaminophen (NORCO) 5-325 MG per tablet 2 tablet    OR Linked Order Group     HYDROmorphone (DILAUDID) injection 0.25 mg     HYDROmorphone (DILAUDID) injection 0.5 mg    ondansetron (ZOFRAN) injection 4 mg    albuterol (PROVENTIL) nebulizer solution 2.5 mg       DDX: Cholecystitis, pancreatitis, mesenteric ischemia, bowel obstruction, ACS    DIAGNOSTIC RESULTS / EMERGENCY DEPARTMENT COURSE / MDM     LABS:  Results for orders placed or performed during the hospital encounter of 09/21/19   CBC WITH AUTO DIFFERENTIAL   Result Value Ref Range    WBC 7.9 3.5 - 11.3 k/uL    RBC 4.21 3.95 - 5.11 m/uL    Hemoglobin 11.2 (L) 11.9 - 15.1 g/dL    Hematocrit 37.5 36.3 - 47.1 %    MCV 89.1 82.6 - 102.9 fL    MCH 26.6 25.2 - 33.5 pg    MCHC 29.9 28.4 - 34.8 g/dL    RDW 19.5 (H) 11.8 - 14.4 %    Platelets 944 (H) 969 - 453 k/uL    MPV 9.6 8.1 - 13.5 fL    NRBC Automated 0.0 0.0 per 100 WBC    Differential Type NOT REPORTED     Seg Neutrophils 60 36 - 65 %    Lymphocytes 24 24 - 43 %    Monocytes 12 3 - 12 %    Eosinophils % 3 1 - 4 %    Basophils 0 0 - 2 %    Immature Granulocytes 1 (H) 0 %    Segs Absolute 4.68 1.50 - 8.10 k/uL    Absolute Lymph # 1.89 1.10 - 3.70 k/uL    Absolute Mono # 0.96 0.10 - 1.20 k/uL    Absolute Eos # 0.24 0.00 - 0.44 k/uL    Basophils Absolute 0.03 0.00 - 0.20 k/uL    Absolute Immature Granulocyte 0.05 0.00 - 0.30 k/uL    WBC Morphology NOT REPORTED     RBC Morphology ANISOCYTOSIS PRESENT     Platelet Estimate NOT REPORTED    Comprehensive Metabolic Panel   Result Value Ref Range    Glucose 97 70 - 99 mg/dL    BUN 12 8 - 23 mg/dL    CREATININE 0.96 (H) 0.50 - 0.90 mg/dL    Bun/Cre Ratio NOT REPORTED 9 - 20    Calcium 10.6 (H) 8.6 - 10.4 mg/dL    Sodium 138 135 - 144 mmol/L    Potassium 3.5 (L) 3.7 - 5.3 mmol/L    Chloride 100 98 - 107 mmol/L    CO2 23 20 - 31 mmol/L    Anion Gap 15 9 - 17 mmol/L    Alkaline Phosphatase 93 35 - 104 U/L    ALT 15 5 - 33 U/L    AST 29 <32 U/L    Total Bilirubin 0.54 0.3 - 1.2 mg/dL    Total Protein 7.9 6.4 - 8.3 g/dL    Alb 3.9 3.5 - 5.2 g/dL    Albumin/Globulin Ratio 1.0 1.0 - 2.5    GFR Non- 58 (L) >60 mL/min    GFR African American >60 >60 mL/min    GFR Comment

## 2019-09-22 ENCOUNTER — APPOINTMENT (OUTPATIENT)
Dept: ULTRASOUND IMAGING | Age: 64
DRG: 282 | End: 2019-09-22
Payer: MEDICARE

## 2019-09-22 VITALS
RESPIRATION RATE: 16 BRPM | TEMPERATURE: 98.4 F | BODY MASS INDEX: 18.48 KG/M2 | DIASTOLIC BLOOD PRESSURE: 83 MMHG | HEIGHT: 59 IN | WEIGHT: 91.7 LBS | OXYGEN SATURATION: 96 % | SYSTOLIC BLOOD PRESSURE: 154 MMHG | HEART RATE: 80 BPM

## 2019-09-22 PROBLEM — E43 SEVERE MALNUTRITION (HCC): Status: ACTIVE | Noted: 2019-09-22

## 2019-09-22 PROBLEM — Z96.641 HISTORY OF TOTAL RIGHT HIP REPLACEMENT: Status: ACTIVE | Noted: 2019-09-22

## 2019-09-22 LAB
ALBUMIN SERPL-MCNC: 3 G/DL (ref 3.5–5.2)
ALBUMIN/GLOBULIN RATIO: 0.9 (ref 1–2.5)
ALP BLD-CCNC: 70 U/L (ref 35–104)
ALT SERPL-CCNC: 11 U/L (ref 5–33)
AMYLASE: 93 U/L (ref 28–100)
ANION GAP SERPL CALCULATED.3IONS-SCNC: 9 MMOL/L (ref 9–17)
AST SERPL-CCNC: 22 U/L
BILIRUB SERPL-MCNC: 0.37 MG/DL (ref 0.3–1.2)
BUN BLDV-MCNC: 9 MG/DL (ref 8–23)
BUN/CREAT BLD: ABNORMAL (ref 9–20)
CALCIUM IONIZED: 1.2 MMOL/L (ref 1.13–1.33)
CALCIUM SERPL-MCNC: 8.5 MG/DL (ref 8.6–10.4)
CHLORIDE BLD-SCNC: 110 MMOL/L (ref 98–107)
CO2: 18 MMOL/L (ref 20–31)
CREAT SERPL-MCNC: 0.59 MG/DL (ref 0.5–0.9)
FERRITIN: 69 UG/L (ref 13–150)
GFR AFRICAN AMERICAN: >60 ML/MIN
GFR NON-AFRICAN AMERICAN: >60 ML/MIN
GFR SERPL CREATININE-BSD FRML MDRD: ABNORMAL ML/MIN/{1.73_M2}
GFR SERPL CREATININE-BSD FRML MDRD: ABNORMAL ML/MIN/{1.73_M2}
GLUCOSE BLD-MCNC: 71 MG/DL (ref 70–99)
HCT VFR BLD CALC: 31 % (ref 36.3–47.1)
HEMOGLOBIN: 8.8 G/DL (ref 11.9–15.1)
INR BLD: 1
IRON SATURATION: 7 % (ref 20–55)
IRON: 23 UG/DL (ref 37–145)
LIPASE: 54 U/L (ref 13–60)
MAGNESIUM: 1.7 MG/DL (ref 1.6–2.6)
MCH RBC QN AUTO: 26.6 PG (ref 25.2–33.5)
MCHC RBC AUTO-ENTMCNC: 28.4 G/DL (ref 28.4–34.8)
MCV RBC AUTO: 93.7 FL (ref 82.6–102.9)
NRBC AUTOMATED: 0 PER 100 WBC
PDW BLD-RTO: 19.6 % (ref 11.8–14.4)
PHOSPHORUS: 3.7 MG/DL (ref 2.6–4.5)
PLATELET # BLD: 550 K/UL (ref 138–453)
PMV BLD AUTO: 9.6 FL (ref 8.1–13.5)
POTASSIUM SERPL-SCNC: 4.5 MMOL/L (ref 3.7–5.3)
PROTHROMBIN TIME: 10.7 SEC (ref 9–12)
RBC # BLD: 3.31 M/UL (ref 3.95–5.11)
SODIUM BLD-SCNC: 137 MMOL/L (ref 135–144)
TOTAL IRON BINDING CAPACITY: 318 UG/DL (ref 250–450)
TOTAL PROTEIN: 6.2 G/DL (ref 6.4–8.3)
TRIGL SERPL-MCNC: 163 MG/DL
UNSATURATED IRON BINDING CAPACITY: 295 UG/DL (ref 112–347)
WBC # BLD: 7.2 K/UL (ref 3.5–11.3)

## 2019-09-22 PROCEDURE — 2580000003 HC RX 258: Performed by: NURSE PRACTITIONER

## 2019-09-22 PROCEDURE — 82150 ASSAY OF AMYLASE: CPT

## 2019-09-22 PROCEDURE — 6370000000 HC RX 637 (ALT 250 FOR IP): Performed by: NURSE PRACTITIONER

## 2019-09-22 PROCEDURE — 76705 ECHO EXAM OF ABDOMEN: CPT

## 2019-09-22 PROCEDURE — 83550 IRON BINDING TEST: CPT

## 2019-09-22 PROCEDURE — 85027 COMPLETE CBC AUTOMATED: CPT

## 2019-09-22 PROCEDURE — 84478 ASSAY OF TRIGLYCERIDES: CPT

## 2019-09-22 PROCEDURE — 83690 ASSAY OF LIPASE: CPT

## 2019-09-22 PROCEDURE — 99232 SBSQ HOSP IP/OBS MODERATE 35: CPT | Performed by: INTERNAL MEDICINE

## 2019-09-22 PROCEDURE — 83540 ASSAY OF IRON: CPT

## 2019-09-22 PROCEDURE — 80053 COMPREHEN METABOLIC PANEL: CPT

## 2019-09-22 PROCEDURE — 99254 IP/OBS CNSLTJ NEW/EST MOD 60: CPT | Performed by: INTERNAL MEDICINE

## 2019-09-22 PROCEDURE — 85610 PROTHROMBIN TIME: CPT

## 2019-09-22 PROCEDURE — 84100 ASSAY OF PHOSPHORUS: CPT

## 2019-09-22 PROCEDURE — 6360000002 HC RX W HCPCS: Performed by: NURSE PRACTITIONER

## 2019-09-22 PROCEDURE — 94640 AIRWAY INHALATION TREATMENT: CPT

## 2019-09-22 PROCEDURE — 82728 ASSAY OF FERRITIN: CPT

## 2019-09-22 PROCEDURE — 83735 ASSAY OF MAGNESIUM: CPT

## 2019-09-22 PROCEDURE — 82330 ASSAY OF CALCIUM: CPT

## 2019-09-22 PROCEDURE — 36415 COLL VENOUS BLD VENIPUNCTURE: CPT

## 2019-09-22 RX ADMIN — GABAPENTIN 300 MG: 300 CAPSULE ORAL at 09:58

## 2019-09-22 RX ADMIN — FERROUS SULFATE TAB EC 325 MG (65 MG FE EQUIVALENT) 325 MG: 325 (65 FE) TABLET DELAYED RESPONSE at 09:56

## 2019-09-22 RX ADMIN — CARVEDILOL 6.25 MG: 6.25 TABLET, FILM COATED ORAL at 09:56

## 2019-09-22 RX ADMIN — APIXABAN 2.5 MG: 2.5 TABLET, FILM COATED ORAL at 09:57

## 2019-09-22 RX ADMIN — HYDROMORPHONE HYDROCHLORIDE 0.5 MG: 1 INJECTION, SOLUTION INTRAMUSCULAR; INTRAVENOUS; SUBCUTANEOUS at 06:22

## 2019-09-22 RX ADMIN — SODIUM CHLORIDE: 9 INJECTION, SOLUTION INTRAVENOUS at 07:18

## 2019-09-22 RX ADMIN — MOMETASONE FUROATE AND FORMOTEROL FUMARATE DIHYDRATE 2 PUFF: 100; 5 AEROSOL RESPIRATORY (INHALATION) at 08:04

## 2019-09-22 RX ADMIN — POTASSIUM CHLORIDE 10 MEQ: 7.46 INJECTION, SOLUTION INTRAVENOUS at 04:27

## 2019-09-22 RX ADMIN — POTASSIUM CHLORIDE 10 MEQ: 7.46 INJECTION, SOLUTION INTRAVENOUS at 05:14

## 2019-09-22 RX ADMIN — HYDROCODONE BITARTRATE AND ACETAMINOPHEN 2 TABLET: 5; 325 TABLET ORAL at 10:14

## 2019-09-22 RX ADMIN — HYDROMORPHONE HYDROCHLORIDE 0.5 MG: 1 INJECTION, SOLUTION INTRAMUSCULAR; INTRAVENOUS; SUBCUTANEOUS at 03:04

## 2019-09-22 RX ADMIN — Medication 100 MG: at 09:56

## 2019-09-22 RX ADMIN — MULTIPLE VITAMINS W/ MINERALS TAB 1 TABLET: TAB at 09:56

## 2019-09-22 RX ADMIN — POTASSIUM CHLORIDE 10 MEQ: 7.46 INJECTION, SOLUTION INTRAVENOUS at 04:26

## 2019-09-22 RX ADMIN — Medication 250 MCG: at 09:55

## 2019-09-22 RX ADMIN — POTASSIUM CHLORIDE 10 MEQ: 7.46 INJECTION, SOLUTION INTRAVENOUS at 02:39

## 2019-09-22 RX ADMIN — FOLIC ACID 1 MG: 1 TABLET ORAL at 09:56

## 2019-09-22 ASSESSMENT — PAIN SCALES - GENERAL
PAINLEVEL_OUTOF10: 7
PAINLEVEL_OUTOF10: 5
PAINLEVEL_OUTOF10: 7

## 2019-09-22 ASSESSMENT — PAIN DESCRIPTION - LOCATION: LOCATION: ABDOMEN

## 2019-09-22 NOTE — CONSULTS
history of gastric bypass. No mention of altered anatomy noted on EGD. Patient also states she was not on [facility] prior to admission. Patient denies any current alcohol, tobacco, or illicit drug use.     OBJECTIVE:     PAST MEDICAL/SURGICAL HISTORY  Past Medical History:   Diagnosis Date    Appendicitis 3/27/2017    CHF (congestive heart failure) (HCC)     Chronic respiratory failure with hypoxia and hypercapnia (HCC)     Chronic rhinitis     Cigarette smoker     COPD (chronic obstructive pulmonary disease) ([address] 2018    Depression     Dysphagia     Essential hypertension 2018    GERD (gastroesophageal reflux disease)     Heart failure, diastolic, with acute decompensation ([address] 2018    Hepatitis C, chronic (HCC)     History of smoking at least 1 pack per day for at least 30 years     Hyperlipidemia     Hypertension     Migraine     Moderate COPD (chronic obstructive pulmonary disease) (HCC)     Multiple transfusions     Osteoarthritis     hands    Pneumonia     Scoliosis     Substance abuse ([address]     ivda and cocaine abuse in past     Past Surgical History:   Procedure Laterality Date    APPENDECTOMY  2017     SECTION      GASTRIC BYPASS SURGERY      HIP SURGERY Right 2019    HIP TOTAL ARTHROPLASTY ANTERIOR APPROACH - [place],  C-ARM, (Right )    LAPAROSCOPIC APPENDECTOMY N/A 3/27/2017    APPENDECTOMY LAPAROSCOPIC performed by Lester Boothe MD at [address] [place] [address]      [email or web address]    [place] COLONOSCOPY W/BIOPSY SINGLE/MULTIPLE N/A 11/10/2018    COLONOSCOPY WITH BIOPSY performed by Maegan Vazquez MD at [facility]    [place] EGD TRANSORAL BIOPSY SINGLE/MULTIPLE N/A 2018    EGD ESOPHAGOGASTRODUODENOSCOPY performed by Carmelina Esquivel MD at [address] Right 2019    HIP TOTAL ARTHROPLASTY ANTERIOR APPROACH - [place]  C-ARM, performed by Kae Conrad DO at [address] [address] 12/5/18   Russ Silva MD   albuterol sulfate HFA (PROVENTIL HFA) 108 (90 Base) MCG/ACT inhaler Inhale 1-2 puffs into the lungs every 4 hours as needed for Wheezing or Shortness of Breath (Space out to every 6 hours as symptoms improve) 12/5/18   Nuvia Denny MD   pantoprazole (PROTONIX) 40 MG tablet Take 1 tablet by mouth 2 times daily (before meals) 11/13/18   Rosalie Napier MD   folic acid (FOLVITE) 1 MG tablet Take 1 tablet by mouth daily 10/13/18   Brayan Mesa MD   vitamin B-1 100 MG tablet Take 1 tablet by mouth daily 10/13/18   Brayan Mesa MD   vitamin B-12 250 MCG tablet Take 1 tablet by mouth daily 10/13/18   Brayan Mesa MD   gabapentin (NEURONTIN) 300 MG capsule Take 300 mg by mouth 3 times daily. Deepa Dye MD   Multiple Vitamins-Minerals (THERAPEUTIC MULTIVITAMIN-MINERALS) tablet Take 1 tablet by mouth daily 8/12/18   Natalya Graf MD       CURRENT MEDICATIONS:  Scheduled Meds:   apixaban  2.5 mg Oral BID    carvedilol  6.25 mg Oral BID WC    ferrous sulfate  325 mg Oral Daily    folic acid  1 mg Oral Daily    gabapentin  300 mg Oral TID    mometasone-formoterol  2 puff Inhalation BID    therapeutic multivitamin-minerals  1 tablet Oral Daily    pantoprazole  40 mg Oral BID AC    thiamine  100 mg Oral Daily    cyanocobalamin  250 mcg Oral Daily    sodium chloride flush  10 mL Intravenous 2 times per day     Continuous Infusions:   sodium chloride 150 mL/hr at 09/22/19 0718     PRN Meds:benzonatate, hydrOXYzine, melatonin, polyethylene glycol, sodium chloride flush, potassium chloride, magnesium sulfate, acetaminophen, HYDROcodone 5 mg - acetaminophen **OR** HYDROcodone 5 mg - acetaminophen, HYDROmorphone **OR** HYDROmorphone, ondansetron, albuterol    SOCIAL HISTORY:     Tobacco:   reports that she quit smoking about 10 months ago. Her smoking use included cigarettes. She started smoking about 49 years ago. She has a 40.00 pack-year smoking history.  She has never Date: 9/21/2019  EXAMINATION: CTA OF THE CHEST; CT OF THE ABDOMEN AND PELVIS WITH CONTRAST 9/21/2019 8:27 pm TECHNIQUE: CTA of the chest was performed after the administration of intravenous contrast.  Multiplanar reformatted images are provided for review. MIP images are provided for review. Dose modulation, iterative reconstruction, and/or weight based adjustment of the mA/kV was utilized to reduce the radiation dose to as low as reasonably achievable.; CT of the abdomen and pelvis was performed with the administration of intravenous contrast. Multiplanar reformatted images are provided for review. Dose modulation, iterative reconstruction, and/or weight based adjustment of the mA/kV was utilized to reduce the radiation dose to as low as reasonably achievable. COMPARISON: CT chest 04/21/2019, 01/07/2019. CT abdomen/pelvis 08/22/2019. HISTORY: ORDERING SYSTEM PROVIDED HISTORY: rule out pe; right lower rib pain s/p surgery 1 month ago with shortness of breath TECHNOLOGIST PROVIDED HISTORY: Reason for Exam: elevated d-dimer Acuity: Acute Type of Exam: Initial FINDINGS: CARDIOVASCULAR:  The pulmonary artery is borderline enlarged. Adequate opacification of the pulmonary arteries to the subsegmental level with no acute intraluminal filling defect. MEDIASTINUM:  Borderline enlarged heart size with no significant pericardial effusion. The esophagus appears unremarkable. Borderline enlarged right paratracheal and right hilar lymph nodes. LUNGS/AIRWAYS:  The trachea and bronchi are patent. Pulmonary hyperinflation and emphysematous changes. No effusion or pneumothorax. Mild atelectasis. No pulmonary mass or acute consolidation. ABDOMEN:  Abdominal aortic atherosclerosis with no aneurysm or dissection.  The hepatic veins, portal vasculature, gallbladder, common bile duct, pancreas, spleen, adrenals, and kidneys are normal.  The liver demonstrates focal fatty infiltration of the liver adjacent to the falciform internal hemorrhoids  4) No residual blood identified. No active bleeding. 5) TI intubated and colonoscope advanced approximately 15 cm, no sources identified.      RECOMMENDATIONS:   1) Can place patient on CLD today, advance tomorrow as tolerated  2) Continue patient on Protonix IV today, can switch to BID PO 40mg tomorrow  3) Await path results from Bx (look for ischemic changes vs viral inclusions, consider CMV related ulcerations given history of chronic steroids and relative immune suppression)  4) GI to follow, please call for any questions.         Génesis Yeung MD   Gastroenterology     -- Diagnosis --   ILEOCECAL VALVE, MARGIN, BIOPSIES:   - BENIGN ACTIVE ULCER.   - SEE COMMENT. -- Diagnosis Comment --   POTENTIAL ETIOLOGIES FOR THE ACTIVE ULCERATION INCLUDE ISCHEMIA AND   NSAID ASSOCIATED MUCOSAL INJURY. EGD   done at bed side in ICU  PREOPERATIVE DIAGNOSIS:   Has bleeding ulcer with vessel we banded 4 days ago, now having another bleeding with BBPR   and drop in HGB , this is to r/o re bleeding in upper GI tract      Diagnosis:  No blood old or new in the upper GI tract , very clean blood    the area in the duodenal bulb which we clipped , is healed , two clips still there , but no bleeding at all. Clean bile no blood   Ulcers in the duodenal bulb . clean base in the other side , no bleeding      Findings:     Retropharyngeal area was grossly normal appearing     Esophagus: abnormal: esophageal stricture      Stomach:    Fundus: normal    Body: normal    Antrum: normal  Very clean no blood      Duodenum:   No blood old or new in the upper GI tract , very clean blood    the area in the duodenal bulb which we clipped , is healed , two clips still there , but no bleeding at all. Clean bile no blood   Ulcers in the duodenal bulb . clean base in the other side , no bleeding   The scope was removed and the patient tolerated the procedure well.      Recommendations/Plan:   1.  If continue to bleed ,

## 2019-09-22 NOTE — PROGRESS NOTES
Usama Baker 17    Progress Note    9/22/2019    7:31 AM    Name:   Spencer Kaba  MRN:     [id number]     Acct:      [de-identified]   Room:   [facility] Day:  1  Admit Date:  9/21/2019  6:12 PM    PCP:   No primary care provider on file. Code Status:  Full Code    Subjective:     C/C:   Chief Complaint   Patient presents with    Abdominal Pain     10/10 generalized started 2 days ago    Diarrhea     started today     Interval History Status:   Denies any diarrhea today  Still has pain in upper abdomen radiating to back  Came back after gallbladder ultrasound, feeling very hungry  Denies nausea vomiting        Brief History:   Emi Daniels is a 75 y.o. Non-/non  female who presents with Abdominal Pain (10/10 generalized started 2 days ago) and Diarrhea (started today)   and is admitted to the hospital for the management of Acute pancreatitis without infection or necrosis.     Ms. Chacorta Choi is a 75 yr old female who presents with 3-4 day history of worsening RUQ ABD pain and 2 day history of diarrhea. Denies any bloody stools or episodes of vomiting. She reports ABD pain as 10/10. CT ABD done in ED, negative acute process, bedside GB ultrasound done with evidence of cholelithiasis. She was also found to have elevated Lipase and Elevated D-Dimer (PE negative).    She is 1 month s/p right hip total arthroplasty who was treated for AVN within the last month. She has had several presentations for persistent drainage of right hip incision and wound vac. Malfunctions-- however, Incision today is CDI without drainaing or fluctuance around the site. She had plans to start op PT/OT this week.     On PE, the patient is awake and alert and resting without distress. IVF infusing, reports prn pain medications effective for pain control. RLQ ABD pain on palpation, BS active.   Ms. Chacorta Choi is very thin, agrees she has had very poor appetite (gastroesophageal reflux disease), Heart failure, diastolic, with acute decompensation ([facility] Hepatitis C, chronic (HCC), History of smoking at least 1 pack per day for at least 30 years, Hyperlipidemia, Hypertension, Migraine, Moderate COPD (chronic obstructive pulmonary disease) [facility] Multiple transfusions, Osteoarthritis, Pneumonia, Scoliosis, and Substance abuse ([address] Social History:   reports that she quit smoking about 10 months ago. Her smoking use included cigarettes. She started smoking about 49 years ago. She has a 40.00 pack-year smoking history. She has never used smokeless tobacco. She reports that she does not drink alcohol or use drugs. Family History:   Family History   Problem Relation Age of Onset    Breast Cancer Mother     Heart Disease Father        Vitals:  /88   Pulse 82   Temp 98.7 °F (37.1 °C) (Oral)   Resp 18   Ht 4' 11\" (1.499 m)   Wt 91 lb 11.2 oz (41.6 kg)   LMP 2015   SpO2 95%   BMI 18.52 kg/m²   Temp (24hrs), Av °F (36.7 °C), Min:97.2 °F (36.2 °C), Max:98.7 °F (37.1 °C)    No results for input(s): POCGLU in the last 72 hours. I/O (24Hr):     Intake/Output Summary (Last 24 hours) at 2019 0731  Last data filed at 2019 5630  Gross per 24 hour   Intake 1071 ml   Output 125 ml   Net 946 ml       Labs:  Hematology:  Recent Labs     19  1305 19  1910 19  0618   WBC 8.1 7.9 7.2   RBC 4.28 4.21 3.31*   HGB 11.3* 11.2* 8.8*   HCT 38.8 37.5 31.0*   MCV 90.7 89.1 93.7   MCH 26.4 26.6 26.6   MCHC 29.1 29.9 28.4   RDW 20.1* 19.5* 19.6*   * 716* 550*   MPV 9.3 9.6 9.6   INR  --   --  1.0   DDIMER  --  2.66  --      Chemistry:  Recent Labs     19  1305 19  1910 198 19  0618    138  --  137   K 3.7 3.5*  --  4.5    100  --  110*   CO2 23 23  --  18*   GLUCOSE 87 97  --  71   BUN 9 12  --  9   CREATININE 0.52 0.96*  --  0.59   MG  --   --   --  1.7   ANIONGAP 16 15  --  9   LABGLOM >60 58*

## 2019-09-22 NOTE — PROGRESS NOTES
Nutrition Assessment    Type and Reason for Visit: Initial, Positive Nutrition Screen(Wt loss, poor appetite )    Nutrition Recommendations:   -Continue CL diet, advance as tolerated  -Recommend ensure clear supplements TID  -As diet advances, suggest modifying ensure clear supplements for ensure enlive supplements  -Will monitor po intake and weights     Nutrition Assessment:  Pt nutritionally compromised aeb severe malnutrition. Pt admitted w/ abdominal pain. Pt reported that she was very hungry and wanted real food. Pt did not want to answer any of writers questions at this time. Pt w/ 40.5% wt loss x 11 mo, severe. Will monitor for diet advancement and add supplements. Goal of wt stabilization/gain as medically able. Malnutrition Assessment:  · Malnutrition Status: Meets the criteria for severe malnutrition  · Context: Chronic illness  · Findings of the 6 clinical characteristics of malnutrition (Minimum of 2 out of 6 clinical characteristics is required to make the diagnosis of moderate or severe Protein Calorie Malnutrition based on AND/ASPEN Guidelines):  1. Energy Intake-Unable to assess, Unable to assess    2. Weight Loss-20% loss or greater, (x 11 mo)  3. Fat Loss-Severe subcutaneous fat loss, Orbital, Triceps  4. Muscle Loss-Severe muscle mass loss, Temples (temporalis muscle), Clavicles (pectoralis and deltoids)  5. Fluid Accumulation-No significant fluid accumulation,    6.   Strength-Not measured    Nutrition Risk Level: High    Nutrient Needs:  · Estimated Daily Total Kcal: 30-35 kcal/kg ~>9650-2700 kcals/d   · Estimated Daily Protein (g): 1.5 gm/kg ~>63 gms/d    Nutrition Diagnosis:   · Problem: Severe malnutrition, In context of chronic illness  · Etiology: related to Insufficient energy/nutrient consumption(medical condition)     Signs and symptoms:  as evidenced by Severe loss of subcutaneous fat, Severe muscle loss, Weight loss greater than or equal to 10% in 6 months(Need for ONS

## 2019-09-22 NOTE — PROGRESS NOTES
rest/acute   e FEV% Predicted       [x]  NA []  Above 69%  []  Unable []  Above 60-69%  []  Unable []  Above 50-59%  []  Unable []  Above 35-49%  []  Unable []  Less than 35%  []  Unable                 []  Hyperinflation Assessment  Score 1 2 3   CXR and Breath Sounds   []  Clear []  No atelectasis  Basilar aeration []  Atelectasis or absent basilar breath sounds   Incentive Spirometry Volume  (Per IBW)   []  Greater than or equal to 15ml/Kg []  less than 15ml/Kg []  less than 15ml/Kg   Surgery within last 2 weeks []  None or general   []  Abdominal or thoracic surgery  []  Abdominal or thoracic   Chronic Pulmonary Historyre []  No []  Yes []  Yes     []  Secretion Management Assessment  Score 1 2 3   Bilateral Breath Sounds   []  Occasional Rhonchi []  Scattered Rhonchi []  Course Rhonchi and/or poor aeration   Sputum    []  Small amount of thin secretions []  Moderate amount of viscous secretions []  Copius, Viscious Yellow/ Secretions   CXR as reported by physician []  clear  []  Unavailable []  Infiltrates and/or consolidation  []  Unavailable []  Mucus Plugging and or lobar consolidation  []  Unavailable   Cough []  Strong, productive cough []  Weak productive cough []  No cough or weak non-productive cough   Marti Riddle  11:05 PM                            FEMALE                                  MALE                            FEV1 Predicted Normal Values                        FEV1 Predicted Normal Values          Age                                     Height in Feet and Inches       Age                                     Height in Feet and Inches       4' 11\" 5' 1\" 5' 3\" 5' 5\" 5' 7\" 5' 9\" 5' 11\" 6' 1\"  4' 11\" 5' 1\" 5' 3\" 5' 5\" 5' 7\" 5' 9\" 5' 11\" 6' 1\"   42 - 45 2.49 2.66 2.84 3.03 3.22 3.42 3.62 3.83 42 - 45 2.82 3.03 3.26 3.49 3.72 3.96 4.22 4.47   46 - 49 2.40 2.57 2.76 2.94 3.14 3.33 3.54 3.75 46 - 49 2.70 2.92 3.14 3.37 3.61 3.85 4.10 4.36   50 - 53 2.31 2.48 2.66 2.85 3.04 3.24 3.45 3.66 50 - 53

## 2019-09-22 NOTE — DISCHARGE SUMMARY
without drainaing or fluctuance around the site.  She had plans to start op PT/OT this week.     On PE, the patient is awake and alert and resting without distress.  IVF infusing, reports prn pain medications effective for pain control.  RLQ ABD pain on palpation, BS active.  Ms. Daniels is very thin, agrees she has had very poor appetite over the last several months and has lost approx 20lbs.  She denies any CP, SOB, palpitations or additional complaints    Patient had ultrasound for gallbladder done which was negative for cholelithiasis or acute cholecystitis, GI agreed to put her on clears but wanted to advance slowly in morning due to drop in hemoglobin and risk of bleeding and recommended to continue IV PPIs however patient wanted to eat solid food and without informing anybody after GI rounds she left AMA    Significant therapeutic interventions: IV fluids, IV PPIs    Significant Diagnostic Studies:   Labs / Micro:  CBC:   Lab Results   Component Value Date    WBC 7.2 09/22/2019    RBC 3.31 09/22/2019    RBC 4.15 05/04/2012    HGB 8.8 09/22/2019    HCT 31.0 09/22/2019    MCV 93.7 09/22/2019    MCH 26.6 09/22/2019    MCHC 28.4 09/22/2019    RDW 19.6 09/22/2019     09/22/2019     05/04/2012     BMP:    Lab Results   Component Value Date    GLUCOSE 71 09/22/2019    GLUCOSE 76 05/04/2012     09/22/2019    K 4.5 09/22/2019     09/22/2019    CO2 18 09/22/2019    ANIONGAP 9 09/22/2019    BUN 9 09/22/2019    CREATININE 0.59 09/22/2019    BUNCRER NOT REPORTED 09/22/2019    CALCIUM 8.5 09/22/2019    LABGLOM >60 09/22/2019    GFRAA >60 09/22/2019    GFR      09/22/2019    GFR NOT REPORTED 09/22/2019     HFP:    Lab Results   Component Value Date    PROT 6.2 09/22/2019     CMP:    Lab Results   Component Value Date    GLUCOSE 71 09/22/2019    GLUCOSE 76 05/04/2012     09/22/2019    K 4.5 09/22/2019     09/22/2019    CO2 18 09/22/2019    BUN 9 09/22/2019    CREATININE 0.59 09/22/2019

## 2019-09-22 NOTE — H&P
[address]    HISTORY AND PHYSICAL EXAMINATION            Date:   9/22/2019  Patient name:  Dallin Gillespie  Date of admission:  9/21/2019  6:12 PM  MRN:   [id number]  Account:  [de-identified]  YOB: 1955  PCP:    No primary care provider on file. Room:   [address] [place] [address]  Code Status:    Full Code    Chief Complaint:     Chief Complaint   Patient presents with    Abdominal Pain     10/10 generalized started 2 days ago    Diarrhea     started today       History Obtained From:     patient, electronic medical record    History of Present Illness:     Emi Daniels is a 75 y.o. Non-/non  female who presents with Abdominal Pain (10/10 generalized started 2 days ago) and Diarrhea (started today)   and is admitted to the hospital for the management of Acute pancreatitis without infection or necrosis. Ms. Angelica Alvarado is a 75 yr old female who presents with 3-4 day history of worsening RUQ ABD pain and 2 day history of diarrhea. Denies any bloody stools or episodes of vomiting. She reports ABD pain as 10/10. CT ABD done in ED, negative acute process, bedside GB ultrasound done with evidence of cholelithiasis. She was also found to have elevated Lipase and Elevated D-Dimer (PE negative). She is 1 month s/p right hip total arthroplasty who was treated for AVN within the last month. She has had several presentations for persistent drainage of right hip incision and wound vac. Malfunctions-- however, Incision today is CDI without drainaing or fluctuance around the site. She had plans to start op PT/OT this week. On PE, the patient is awake and alert and resting without distress. IVF infusing, reports prn pain medications effective for pain control. RLQ ABD pain on palpation, BS active. Ms. Angelica Alvarado is very thin, agrees she has had very poor appetite over the last several months and has lost approx 20lbs.   She denies any CP, SOB, pg    MCHC 29.9 28.4 - 34.8 g/dL    RDW 19.5 (H) 11.8 - 14.4 %    Platelets 276 (H) 198 - 453 k/uL    MPV 9.6 8.1 - 13.5 fL    NRBC Automated 0.0 0.0 per 100 WBC    Differential Type NOT REPORTED     Seg Neutrophils 60 36 - 65 %    Lymphocytes 24 24 - 43 %    Monocytes 12 3 - 12 %    Eosinophils % 3 1 - 4 %    Basophils 0 0 - 2 %    Immature Granulocytes 1 (H) 0 %    Segs Absolute 4.68 1.50 - 8.10 k/uL    Absolute Lymph # 1.89 1.10 - 3.70 k/uL    Absolute Mono # 0.96 0.10 - 1.20 k/uL    Absolute Eos # 0.24 0.00 - 0.44 k/uL    Basophils Absolute 0.03 0.00 - 0.20 k/uL    Absolute Immature Granulocyte 0.05 0.00 - 0.30 k/uL    WBC Morphology NOT REPORTED     RBC Morphology ANISOCYTOSIS PRESENT     Platelet Estimate NOT REPORTED    Comprehensive Metabolic Panel    Collection Time: 09/21/19  7:10 PM   Result Value Ref Range    Glucose 97 70 - 99 mg/dL    BUN 12 8 - 23 mg/dL    CREATININE 0.96 (H) 0.50 - 0.90 mg/dL    Bun/Cre Ratio NOT REPORTED 9 - 20    Calcium 10.6 (H) 8.6 - 10.4 mg/dL    Sodium 138 135 - 144 mmol/L    Potassium 3.5 (L) 3.7 - 5.3 mmol/L    Chloride 100 98 - 107 mmol/L    CO2 23 20 - 31 mmol/L    Anion Gap 15 9 - 17 mmol/L    Alkaline Phosphatase 93 35 - 104 U/L    ALT 15 5 - 33 U/L    AST 29 <32 U/L    Total Bilirubin 0.54 0.3 - 1.2 mg/dL    Total Protein 7.9 6.4 - 8.3 g/dL    Alb 3.9 3.5 - 5.2 g/dL    Albumin/Globulin Ratio 1.0 1.0 - 2.5    GFR Non- 58 (L) >60 mL/min    GFR African American >60 >60 mL/min    GFR Comment          GFR Staging NOT REPORTED    LIPASE    Collection Time: 09/21/19  7:10 PM   Result Value Ref Range    Lipase 185 (H) 13 - 60 U/L   Lactate, Sepsis    Collection Time: 09/21/19  7:10 PM   Result Value Ref Range    Lactic Acid, Sepsis NOT REPORTED 0.5 - 1.9 mmol/L    Lactic Acid, Sepsis, Whole Blood 1.2 0.5 - 1.9 mmol/L   Troponin    Collection Time: 09/21/19  7:10 PM   Result Value Ref Range    Troponin, High Sensitivity 19 (H) 0 - 14 ng/L    Troponin T NOT

## 2019-09-23 ENCOUNTER — HOSPITAL ENCOUNTER (EMERGENCY)
Age: 64
Discharge: HOME | End: 2019-09-23
Attending: EMERGENCY MEDICINE
Payer: MEDICARE

## 2019-09-23 VITALS
HEIGHT: 60 IN | HEART RATE: 85 BPM | DIASTOLIC BLOOD PRESSURE: 95 MMHG | OXYGEN SATURATION: 98 % | BODY MASS INDEX: 18.65 KG/M2 | WEIGHT: 95 LBS | SYSTOLIC BLOOD PRESSURE: 180 MMHG | RESPIRATION RATE: 18 BRPM

## 2019-09-23 DIAGNOSIS — R10.9 CHRONIC ABDOMINAL PAIN: Primary | ICD-10-CM

## 2019-09-23 DIAGNOSIS — G89.29 CHRONIC ABDOMINAL PAIN: Primary | ICD-10-CM

## 2019-09-23 LAB
EKG ATRIAL RATE: 89 BPM
EKG P AXIS: 76 DEGREES
EKG P-R INTERVAL: 160 MS
EKG Q-T INTERVAL: 384 MS
EKG QRS DURATION: 70 MS
EKG QTC CALCULATION (BAZETT): 467 MS
EKG R AXIS: 69 DEGREES
EKG T AXIS: 73 DEGREES
EKG VENTRICULAR RATE: 89 BPM
TROPONIN INTERP: NORMAL
TROPONIN T: NORMAL NG/ML
TROPONIN, HIGH SENSITIVITY: 11 NG/L (ref 0–14)

## 2019-09-23 PROCEDURE — 99284 EMERGENCY DEPT VISIT MOD MDM: CPT

## 2019-09-23 PROCEDURE — 6370000000 HC RX 637 (ALT 250 FOR IP): Performed by: STUDENT IN AN ORGANIZED HEALTH CARE EDUCATION/TRAINING PROGRAM

## 2019-09-23 PROCEDURE — 93005 ELECTROCARDIOGRAM TRACING: CPT | Performed by: EMERGENCY MEDICINE

## 2019-09-23 PROCEDURE — 84484 ASSAY OF TROPONIN QUANT: CPT

## 2019-09-23 RX ORDER — IBUPROFEN 400 MG/1
400 TABLET ORAL ONCE
Status: DISCONTINUED | OUTPATIENT
Start: 2019-09-23 | End: 2019-09-23

## 2019-09-23 RX ORDER — CARVEDILOL 12.5 MG/1
6.25 TABLET ORAL ONCE
Status: COMPLETED | OUTPATIENT
Start: 2019-09-23 | End: 2019-09-23

## 2019-09-23 RX ADMIN — CARVEDILOL 6.25 MG: 12.5 TABLET, FILM COATED ORAL at 11:11

## 2019-09-23 ASSESSMENT — PAIN DESCRIPTION - LOCATION: LOCATION: ABDOMEN

## 2019-09-23 ASSESSMENT — PAIN DESCRIPTION - PAIN TYPE: TYPE: CHRONIC PAIN

## 2019-09-23 ASSESSMENT — ENCOUNTER SYMPTOMS
BACK PAIN: 0
SHORTNESS OF BREATH: 0
COLOR CHANGE: 0
ABDOMINAL DISTENTION: 0
CONSTIPATION: 0
FACIAL SWELLING: 0
CHEST TIGHTNESS: 0
BLOOD IN STOOL: 0
RHINORRHEA: 0
NAUSEA: 0
ABDOMINAL PAIN: 1
DIARRHEA: 1
VOMITING: 0

## 2019-09-23 ASSESSMENT — PAIN - FUNCTIONAL ASSESSMENT: PAIN_FUNCTIONAL_ASSESSMENT: ACTIVITIES ARE NOT PREVENTED

## 2019-09-23 ASSESSMENT — PAIN DESCRIPTION - PROGRESSION: CLINICAL_PROGRESSION: GRADUALLY WORSENING

## 2019-09-23 ASSESSMENT — PAIN DESCRIPTION - DESCRIPTORS: DESCRIPTORS: DISCOMFORT;SHARP

## 2019-09-23 ASSESSMENT — PAIN SCALES - GENERAL: PAINLEVEL_OUTOF10: 10

## 2019-09-23 ASSESSMENT — PAIN DESCRIPTION - ONSET: ONSET: ON-GOING

## 2019-09-23 ASSESSMENT — PAIN DESCRIPTION - ORIENTATION: ORIENTATION: RIGHT;MID

## 2019-09-23 ASSESSMENT — PAIN DESCRIPTION - FREQUENCY: FREQUENCY: CONTINUOUS

## 2019-09-23 NOTE — ED TRIAGE NOTES
Patient c/o mid and rt sided abdominal pain. Patient denies any N/V/D. Patient states that she did have a flutter feeling in her chest but it passed. Patient denies any SOB or CP currently.

## 2019-09-23 NOTE — H&P
lungs every 4 hours as needed for Wheezing or Shortness of Breath (Space out to every 6 hours as symptoms improve) 12/5/18  Yes Bernice Benton MD   pantoprazole (PROTONIX) 40 MG tablet Take 1 tablet by mouth 2 times daily (before meals) 11/13/18  Yes Frankey Horseman, MD   folic acid (FOLVITE) 1 MG tablet Take 1 tablet by mouth daily 10/13/18  Yes Laura Soto MD   vitamin B-1 100 MG tablet Take 1 tablet by mouth daily 10/13/18  Yes Laura Soto MD   vitamin B-12 250 MCG tablet Take 1 tablet by mouth daily 10/13/18  Yes Laura Soto MD   gabapentin (NEURONTIN) 300 MG capsule Take 300 mg by mouth 3 times daily. Lacie Hopkins Historical Provider, MD   Multiple Vitamins-Minerals (THERAPEUTIC MULTIVITAMIN-MINERALS) tablet Take 1 tablet by mouth daily 8/12/18  Yes Andres Reynolds MD   Misc. Devices [facility]) MISC 1 Device by Does not apply route once for 1 dose 8/5/19 8/5/19  Alex Tariq PA-C       REVIEW OFSYSTEMS    (2-9 systems for level 4, 10 or more for level 5)      Review of Systems   Constitutional: Positive for fatigue. Negative for activity change, chills and diaphoresis. HENT: Negative for facial swelling, hearing loss, rhinorrhea and tinnitus. Eyes: Negative for visual disturbance. Respiratory: Negative for chest tightness and shortness of breath. Cardiovascular: Positive for chest pain. Gastrointestinal: Positive for abdominal pain and diarrhea. Negative for abdominal distention, blood in stool, constipation, nausea and vomiting. Endocrine: Positive for polyuria. Genitourinary: Positive for frequency. Negative for difficulty urinating, dysuria, enuresis, flank pain and hematuria. Musculoskeletal: Negative for arthralgias and back pain. Skin: Negative for color change. Neurological: Positive for headaches. Psychiatric/Behavioral: Negative for agitation.        PHYSICAL EXAM   (up to 7 for level 4, 8 or more forlevel 5)      INITIAL VITALS:   ED Triage Vitals [09/23/19 1021]   BP chest/upper abdominal body wall abnormality. 4. No acute abdominal process. 5. Prior right hip arthroplasty with normal alignment and expected postsurgical change. No acute pelvic abnormality. Us Gallbladder Ruq    Result Date: 9/22/2019  Unremarkable ultrasound of the gallbladder. No evidence for cholelithiasis or acute cholecystitis. Ct Chest Pulmonary Embolism W Contrast    Result Date: 9/21/2019  1. No acute pulmonary emboli. 2. COPD. No acute pulmonary process. 3. No acute left chest/upper abdominal body wall abnormality. 4. No acute abdominal process. 5. Prior right hip arthroplasty with normal alignment and expected postsurgical change. No acute pelvic abnormality. EKG  Normal sinus     All EKG's are interpreted by the Emergency Department Physicianwho either signs or Co-signs this chart in the absence of a cardiologist.    EMERGENCY DEPARTMENT COURSE:  EKG done, trop drawn, abdominal exam mildly tender, no rebound, guarding, or signs of peritonitis. Patient left AMA on 9/22, the day before her arrival to the emergency department where she had previously had a work-up by GI, which showed no signs of acute cholelithiasis or cholecystitis, patient was also noted to have a normal abdominal CT exam at that time. Patient was offered Motrin for pain management and denied said treatment. When patient was not going to be given additional pain medication, patient said that she was going to leave against medical opinion. On the emergency department patient had an elevated blood pressure, patient was given her home dose of antihypertensives, but left AMA before a repeat blood pressure could be taken. PROCEDURES:  None    CONSULTS:  None    CRITICAL CARE:  Please see attending note    FINAL IMPRESSION      1.  Chronic abdominal pain    Patient had extensive GI work-up earlier this week (leaving the hospital the day before she arrived back in the emergency department) showed no acute abdominal pathology including cholelithiasis and cholecystitis. Patient chest pain was worked up with a normal EKG and negative troponins. She was advised to stay around until her blood pressure to be normalized, was told risk of death morbidity or mortality could occur if she left before her blood pressure was managed, and decided to leave against medical opinion. DISPOSITION / PLAN     DISPOSITION: Left AMA      PATIENT REFERRED TO:  No follow-up provider specified.     DISCHARGE MEDICATIONS:  Discharge Medication List as of 9/23/2019 11:53 AM          Pantera Paz MD  Emergency Medicine Resident    (Please note that portions of this note were completed with a voice recognition program.Efforts were made to edit the dictations but occasionally words are mis-transcribed.)

## 2019-09-23 NOTE — ED PROVIDER NOTES
I performed a history and physical examination of the patient and discussed management with the resident. I reviewed the residents note and agree with the documented findings and plan of care. Any areas of disagreement are noted on the chart. I was personally present for the key portions of any procedures. I have documented in the chart those procedures where I was not present during the key portions. I have reviewed the emergency nurses triage note. I agree with the chief complaint, past medical history, past surgical history, allergies, medications, social and family history as documented unless otherwise noted below. Documentation of the HPI, Physical Exam and Medical Decision Making performed by medical students or scribes is based on my personal performance of the HPI, PE and MDM. For Phys Assistant/ Nurse Practitioner cases/documentation I have personally evaluated this patient and have completed at least one if not all key elements of the E/M (history, physical exam, and MDM). I find the patient's history and physical exam are consistent with the NP/PA documentation. I agree with the care provided, treatment rendered, disposition and followup plan. Additional findings are as noted. Hay Manning. Ladonna Sandoval MD  Attending Emergency  Physician      PRESENTING WITH C/O ABD PAIN AND CHEST 'FLUTTERS'. HX OF CHRONIC ABD PAIN, HEPATITIS C, CHRONIC ETOH USE, HTN, COPD/ASTHMA. NAUSEA, NO EMESIS. NO COUGH, HEMOPTYSIS, SOB, DIARRHEA, MELENA, HEMATOCHEZIA, HEMATEMESIS. ? URINARY FREQUENCY? NO HEMATURIA, DYSURIA. DID NOT TAKE ANY OF HER MEDS TODAY. WAS ADMITTED HERE 2D. AGO WITH SAME C/O, NEG W/U WHILE ADMITTED, THEN SIGNED OUT AMA YEST. AWAKE, ALERT, COOP, RESP. LUNGS CLEAR BRII. NO RALES, RHONCHI, WHEEZES, STRIDOR, RETRACTIONS. CARDIAC-S1S2, RRR, NO MRG. ABD SOFT, NONDISTENDED, MILD RUQ TENDERNESS. NO GUARDING, REBOUND, ORGANOMEGALY, MASS, BRUIT. NORMAL BOWEL SOUNDS.  SCLERAE ANICTERIC.   LAB AND US RESULTS FROM YEST REVIEWED-NO DIAGNOSTIC FINDINGS. NO CLINICALLY SIGNIFICANT DEVIATION FROM PATIENT'S BASELINE VALUES. IMP-CHRONIC ABD PAIN, HEPATITIS C. CHEST 'FLUTTERS'. PLAN-ECG, TROP, UA. REASSESS. Martha Pate MD  09/23/19 1040    ADVISED THAT PATIENT IS LEAVING ED PRIOR TO COMPLETION OF EVALUATION. ATTEMPTED TO DISCUSS ISSUES WITH PATIENT BUT PATIENT LEFT PRIOR TO THAT DISCUSSION.        Martha Pate MD  09/25/19 1011

## 2019-09-24 ENCOUNTER — HOSPITAL ENCOUNTER (OUTPATIENT)
Dept: PHYSICAL THERAPY | Age: 64
Setting detail: THERAPIES SERIES
Discharge: HOME | End: 2019-09-24
Payer: MEDICARE

## 2019-09-24 LAB
EKG ATRIAL RATE: 82 BPM
EKG P AXIS: 80 DEGREES
EKG P-R INTERVAL: 174 MS
EKG Q-T INTERVAL: 382 MS
EKG QRS DURATION: 70 MS
EKG QTC CALCULATION (BAZETT): 446 MS
EKG R AXIS: 67 DEGREES
EKG T AXIS: 73 DEGREES
EKG VENTRICULAR RATE: 82 BPM

## 2019-09-25 NOTE — ED PROVIDER NOTES
Originally placed in error under H+P      Jadene Schaumann, MD   Resident   Emergency Medicine   H&P   Cosign Needed   Date of Service:  9/23/2019  9:54 AM               Cosign Needed        Expand All Collapse All      Show:Clear all  [x]Manual[x]Template[]Copied    Added by:  [x]Nelson Duarte MD    []Adamaris for details         [facility] ED  Emergency Department Encounter  Emergency Medicine Resident      Pt Name: Varinder Wharton  MRN: [id number]  [place] 1955  Date of evaluation: 9/23/19  PCP:  No primary care provider on file.     CHIEF COMPLAINT            Chief Complaint   Patient presents with    Abdominal Pain    Chest Pain         HISTORY OFPRESENT ILLNESS  (Location/Symptom, Timing/Onset, Context/Setting, Quality, Duration, Modifying Factors,Severity.)       Emi Daniels is a 75year old female who presents with intermittent right upper quadrant abdominal pain radiating to her back of 2 weeks duration. Patient was admitted on 9/21 for workup of similar symptoms, where an abdominal ultrasound showed no signs of cholelithiasis. Patient was set to slowly advance her diet due to risk of GI bleeding but patient signed out AMA and has been able to eat solid foods since she left the hospital. At this given juncture patient endorses right upper quadrant and midline abdominal pain of 10/10 intensity, denies exacerbating or relieving factors, denies nausea, endorses diarrhea of a week or so duration.   Patient does have a acute medical history including congestive heart failure, COPD, GERD, essential hypertension, hepatitis C  PAST MEDICAL / SURGICAL / SOCIAL / FAMILY HISTORY       has a past medical history of Appendicitis, CHF (congestive heart failure) ([facility]), Chronic respiratory failure with hypoxia and hypercapnia (HCC), Chronic rhinitis, Cigarette smoker, COPD (chronic obstructive pulmonary disease) ([facility] [address] Depression, Dysphagia, Essential hypertension, GERD (gastroesophageal reflux given additional pain medication, patient said that she was going to leave against medical opinion.     On the emergency department patient had an elevated blood pressure, patient was given her home dose of antihypertensives, but left AMA before a repeat blood pressure could be taken.        PROCEDURES:  None     CONSULTS:  None     CRITICAL CARE:  Please see attending note     FINAL IMPRESSION       1. Chronic abdominal pain    Patient had extensive GI work-up earlier this week (leaving the hospital the day before she arrived back in the emergency department) showed no acute abdominal pathology including cholelithiasis and cholecystitis. Patient chest pain was worked up with a normal EKG and negative troponins.   She was advised to stay around until her blood pressure to be normalized, was told risk of death morbidity or mortality could occur if she left before her blood pressure was managed, and decided to leave against medical opinion.     DISPOSITION / PLAN      DISPOSITION: Left AMA        PATIENT REFERRED TO:  No follow-up provider specified.     DISCHARGE MEDICATIONS:  Discharge Medication List as of 9/23/2019 11:53 AM             Sandy Spangler MD  Emergency Medicine Resident                   Sandy Spangler MD  Resident  09/25/19 0125

## 2019-11-04 ENCOUNTER — TELEPHONE (OUTPATIENT)
Dept: ONCOLOGY | Age: 64
End: 2019-11-04

## 2019-11-13 ENCOUNTER — APPOINTMENT (OUTPATIENT)
Dept: GENERAL RADIOLOGY | Age: 64
DRG: 140 | End: 2019-11-13
Payer: MEDICARE

## 2019-11-13 ENCOUNTER — HOSPITAL ENCOUNTER (INPATIENT)
Age: 64
LOS: 2 days | Discharge: HOME | DRG: 140 | End: 2019-11-15
Attending: EMERGENCY MEDICINE | Admitting: FAMILY MEDICINE
Payer: MEDICARE

## 2019-11-13 ENCOUNTER — APPOINTMENT (OUTPATIENT)
Dept: CT IMAGING | Age: 64
DRG: 140 | End: 2019-11-13
Payer: MEDICARE

## 2019-11-13 DIAGNOSIS — J44.1 COPD EXACERBATION (HCC): Primary | ICD-10-CM

## 2019-11-13 PROBLEM — J96.21 ACUTE ON CHRONIC RESPIRATORY FAILURE WITH HYPOXEMIA (HCC): Status: ACTIVE | Noted: 2019-11-13

## 2019-11-13 PROBLEM — R79.89 ELEVATED LACTIC ACID LEVEL: Status: ACTIVE | Noted: 2019-11-13

## 2019-11-13 PROBLEM — I50.9 CHRONIC CONGESTIVE HEART FAILURE (HCC): Status: ACTIVE | Noted: 2019-11-13

## 2019-11-13 PROBLEM — R79.89 ELEVATED BRAIN NATRIURETIC PEPTIDE (BNP) LEVEL: Status: ACTIVE | Noted: 2019-11-13

## 2019-11-13 LAB
-: NORMAL
ABSOLUTE EOS #: 0 K/UL (ref 0–0.4)
ABSOLUTE IMMATURE GRANULOCYTE: 0 K/UL (ref 0–0.3)
ABSOLUTE LYMPH #: 1.8 K/UL (ref 1–4.8)
ABSOLUTE MONO #: 1.35 K/UL (ref 0.1–0.8)
ALBUMIN SERPL-MCNC: 4 G/DL (ref 3.5–5.2)
ALBUMIN/GLOBULIN RATIO: 1.1 (ref 1–2.5)
ALP BLD-CCNC: 66 U/L (ref 35–104)
ALT SERPL-CCNC: 16 U/L (ref 5–33)
ANION GAP SERPL CALCULATED.3IONS-SCNC: 13 MMOL/L (ref 9–17)
ANION GAP SERPL CALCULATED.3IONS-SCNC: 18 MMOL/L (ref 9–17)
AST SERPL-CCNC: 28 U/L
BASOPHILS # BLD: 0 % (ref 0–2)
BASOPHILS ABSOLUTE: 0 K/UL (ref 0–0.2)
BILIRUB SERPL-MCNC: 0.23 MG/DL (ref 0.3–1.2)
BILIRUBIN DIRECT: 0.08 MG/DL
BILIRUBIN, INDIRECT: 0.15 MG/DL (ref 0–1)
BNP INTERPRETATION: ABNORMAL
BUN BLDV-MCNC: 24 MG/DL (ref 8–23)
BUN BLDV-MCNC: 31 MG/DL (ref 8–23)
BUN/CREAT BLD: ABNORMAL (ref 9–20)
BUN/CREAT BLD: ABNORMAL (ref 9–20)
C-REACTIVE PROTEIN: 4.1 MG/L (ref 0–5)
CALCIUM SERPL-MCNC: 8.3 MG/DL (ref 8.6–10.4)
CALCIUM SERPL-MCNC: 8.5 MG/DL (ref 8.6–10.4)
CHLORIDE BLD-SCNC: 109 MMOL/L (ref 98–107)
CHLORIDE BLD-SCNC: 112 MMOL/L (ref 98–107)
CO2: 15 MMOL/L (ref 20–31)
CO2: 20 MMOL/L (ref 20–31)
CREAT SERPL-MCNC: 0.62 MG/DL (ref 0.5–0.9)
CREAT SERPL-MCNC: 0.65 MG/DL (ref 0.5–0.9)
DIFFERENTIAL TYPE: ABNORMAL
DIRECT EXAM: NORMAL
EKG ATRIAL RATE: 129 BPM
EKG P AXIS: 72 DEGREES
EKG P-R INTERVAL: 128 MS
EKG Q-T INTERVAL: 318 MS
EKG QRS DURATION: 62 MS
EKG QTC CALCULATION (BAZETT): 465 MS
EKG R AXIS: 68 DEGREES
EKG T AXIS: 54 DEGREES
EKG VENTRICULAR RATE: 129 BPM
EOSINOPHILS RELATIVE PERCENT: 0 % (ref 1–4)
GFR AFRICAN AMERICAN: >60 ML/MIN
GFR AFRICAN AMERICAN: >60 ML/MIN
GFR NON-AFRICAN AMERICAN: >60 ML/MIN
GFR NON-AFRICAN AMERICAN: >60 ML/MIN
GFR SERPL CREATININE-BSD FRML MDRD: ABNORMAL ML/MIN/{1.73_M2}
GLOBULIN: ABNORMAL G/DL (ref 1.5–3.8)
GLUCOSE BLD-MCNC: 159 MG/DL (ref 70–99)
GLUCOSE BLD-MCNC: 314 MG/DL (ref 70–99)
GLUCOSE BLD-MCNC: 333 MG/DL (ref 65–105)
HCT VFR BLD CALC: 40.8 % (ref 36.3–47.1)
HEMOGLOBIN: 11.9 G/DL (ref 11.9–15.1)
IMMATURE GRANULOCYTES: 0 %
INR BLD: 0.9
LACTIC ACID, WHOLE BLOOD: 3.5 MMOL/L (ref 0.7–2.1)
LACTIC ACID, WHOLE BLOOD: 4.6 MMOL/L (ref 0.7–2.1)
LACTIC ACID, WHOLE BLOOD: 5.3 MMOL/L (ref 0.7–2.1)
LIPASE: 117 U/L (ref 13–60)
LYMPHOCYTES # BLD: 12 % (ref 24–44)
Lab: NORMAL
MCH RBC QN AUTO: 27 PG (ref 25.2–33.5)
MCHC RBC AUTO-ENTMCNC: 29.2 G/DL (ref 28.4–34.8)
MCV RBC AUTO: 92.7 FL (ref 82.6–102.9)
MONOCYTES # BLD: 9 % (ref 1–7)
MORPHOLOGY: ABNORMAL
MYOGLOBIN: <21 NG/ML (ref 25–58)
NRBC AUTOMATED: 0 PER 100 WBC
PDW BLD-RTO: 21.7 % (ref 11.8–14.4)
PLATELET # BLD: 509 K/UL (ref 138–453)
PLATELET ESTIMATE: ABNORMAL
PMV BLD AUTO: 9.9 FL (ref 8.1–13.5)
POTASSIUM SERPL-SCNC: 4.2 MMOL/L (ref 3.7–5.3)
POTASSIUM SERPL-SCNC: 5.7 MMOL/L (ref 3.7–5.3)
PRO-BNP: 580 PG/ML
PROCALCITONIN: 0.14 NG/ML
PROTHROMBIN TIME: 10 SEC (ref 9–12)
RBC # BLD: 4.4 M/UL (ref 3.95–5.11)
RBC # BLD: ABNORMAL 10*6/UL
REASON FOR REJECTION: NORMAL
SEG NEUTROPHILS: 79 % (ref 36–66)
SEGMENTED NEUTROPHILS ABSOLUTE COUNT: 11.85 K/UL (ref 1.8–7.7)
SODIUM BLD-SCNC: 142 MMOL/L (ref 135–144)
SODIUM BLD-SCNC: 145 MMOL/L (ref 135–144)
SPECIMEN DESCRIPTION: NORMAL
TOTAL PROTEIN: 7.7 G/DL (ref 6.4–8.3)
TROPONIN INTERP: ABNORMAL
TROPONIN T: ABNORMAL NG/ML
TROPONIN, HIGH SENSITIVITY: 13 NG/L (ref 0–14)
TROPONIN, HIGH SENSITIVITY: 13 NG/L (ref 0–14)
TROPONIN, HIGH SENSITIVITY: 17 NG/L (ref 0–14)
TROPONIN, HIGH SENSITIVITY: 24 NG/L (ref 0–14)
TROPONIN, HIGH SENSITIVITY: 25 NG/L (ref 0–14)
WBC # BLD: 15 K/UL (ref 3.5–11.3)
WBC # BLD: ABNORMAL 10*3/UL
ZZ NTE CLEAN UP: ORDERED TEST: NORMAL
ZZ NTE WITH NAME CLEAN UP: SPECIMEN SOURCE: NORMAL

## 2019-11-13 PROCEDURE — 6360000002 HC RX W HCPCS: Performed by: NURSE PRACTITIONER

## 2019-11-13 PROCEDURE — 6360000002 HC RX W HCPCS: Performed by: FAMILY MEDICINE

## 2019-11-13 PROCEDURE — 97166 OT EVAL MOD COMPLEX 45 MIN: CPT

## 2019-11-13 PROCEDURE — 84145 PROCALCITONIN (PCT): CPT

## 2019-11-13 PROCEDURE — 71045 X-RAY EXAM CHEST 1 VIEW: CPT

## 2019-11-13 PROCEDURE — 87070 CULTURE OTHR SPECIMN AEROBIC: CPT

## 2019-11-13 PROCEDURE — 87205 SMEAR GRAM STAIN: CPT

## 2019-11-13 PROCEDURE — 83036 HEMOGLOBIN GLYCOSYLATED A1C: CPT

## 2019-11-13 PROCEDURE — 96372 THER/PROPH/DIAG INJ SC/IM: CPT

## 2019-11-13 PROCEDURE — 85025 COMPLETE CBC W/AUTO DIFF WBC: CPT

## 2019-11-13 PROCEDURE — 6370000000 HC RX 637 (ALT 250 FOR IP): Performed by: NURSE PRACTITIONER

## 2019-11-13 PROCEDURE — 6370000000 HC RX 637 (ALT 250 FOR IP): Performed by: FAMILY MEDICINE

## 2019-11-13 PROCEDURE — 74018 RADEX ABDOMEN 1 VIEW: CPT

## 2019-11-13 PROCEDURE — 99285 EMERGENCY DEPT VISIT HI MDM: CPT

## 2019-11-13 PROCEDURE — 2060000000 HC ICU INTERMEDIATE R&B

## 2019-11-13 PROCEDURE — 93005 ELECTROCARDIOGRAM TRACING: CPT | Performed by: STUDENT IN AN ORGANIZED HEALTH CARE EDUCATION/TRAINING PROGRAM

## 2019-11-13 PROCEDURE — 2580000003 HC RX 258: Performed by: NURSE PRACTITIONER

## 2019-11-13 PROCEDURE — 2500000003 HC RX 250 WO HCPCS: Performed by: FAMILY MEDICINE

## 2019-11-13 PROCEDURE — 94761 N-INVAS EAR/PLS OXIMETRY MLT: CPT

## 2019-11-13 PROCEDURE — 6360000002 HC RX W HCPCS: Performed by: STUDENT IN AN ORGANIZED HEALTH CARE EDUCATION/TRAINING PROGRAM

## 2019-11-13 PROCEDURE — 83874 ASSAY OF MYOGLOBIN: CPT

## 2019-11-13 PROCEDURE — 36415 COLL VENOUS BLD VENIPUNCTURE: CPT

## 2019-11-13 PROCEDURE — 86140 C-REACTIVE PROTEIN: CPT

## 2019-11-13 PROCEDURE — 6360000004 HC RX CONTRAST MEDICATION: Performed by: FAMILY MEDICINE

## 2019-11-13 PROCEDURE — 94660 CPAP INITIATION&MGMT: CPT

## 2019-11-13 PROCEDURE — 83880 ASSAY OF NATRIURETIC PEPTIDE: CPT

## 2019-11-13 PROCEDURE — 80048 BASIC METABOLIC PNL TOTAL CA: CPT

## 2019-11-13 PROCEDURE — 82947 ASSAY GLUCOSE BLOOD QUANT: CPT

## 2019-11-13 PROCEDURE — 85610 PROTHROMBIN TIME: CPT

## 2019-11-13 PROCEDURE — 80076 HEPATIC FUNCTION PANEL: CPT

## 2019-11-13 PROCEDURE — 84484 ASSAY OF TROPONIN QUANT: CPT

## 2019-11-13 PROCEDURE — 2700000000 HC OXYGEN THERAPY PER DAY

## 2019-11-13 PROCEDURE — 96374 THER/PROPH/DIAG INJ IV PUSH: CPT

## 2019-11-13 PROCEDURE — 83605 ASSAY OF LACTIC ACID: CPT

## 2019-11-13 PROCEDURE — 71260 CT THORAX DX C+: CPT

## 2019-11-13 PROCEDURE — 97161 PT EVAL LOW COMPLEX 20 MIN: CPT

## 2019-11-13 PROCEDURE — 76937 US GUIDE VASCULAR ACCESS: CPT

## 2019-11-13 PROCEDURE — 6370000000 HC RX 637 (ALT 250 FOR IP): Performed by: STUDENT IN AN ORGANIZED HEALTH CARE EDUCATION/TRAINING PROGRAM

## 2019-11-13 PROCEDURE — 93010 ELECTROCARDIOGRAM REPORT: CPT | Performed by: INTERNAL MEDICINE

## 2019-11-13 PROCEDURE — 99223 1ST HOSP IP/OBS HIGH 75: CPT | Performed by: FAMILY MEDICINE

## 2019-11-13 PROCEDURE — 83690 ASSAY OF LIPASE: CPT

## 2019-11-13 PROCEDURE — 94640 AIRWAY INHALATION TREATMENT: CPT

## 2019-11-13 RX ORDER — IPRATROPIUM BROMIDE AND ALBUTEROL SULFATE 2.5; .5 MG/3ML; MG/3ML
1 SOLUTION RESPIRATORY (INHALATION)
Status: DISCONTINUED | OUTPATIENT
Start: 2019-11-13 | End: 2019-11-15 | Stop reason: HOSPADM

## 2019-11-13 RX ORDER — LEVOFLOXACIN 5 MG/ML
500 INJECTION, SOLUTION INTRAVENOUS EVERY 24 HOURS
Status: DISCONTINUED | OUTPATIENT
Start: 2019-11-13 | End: 2019-11-15 | Stop reason: HOSPADM

## 2019-11-13 RX ORDER — LANOLIN ALCOHOL/MO/W.PET/CERES
325 CREAM (GRAM) TOPICAL DAILY
Status: DISCONTINUED | OUTPATIENT
Start: 2019-11-13 | End: 2019-11-15 | Stop reason: HOSPADM

## 2019-11-13 RX ORDER — MAGNESIUM SULFATE 1 G/100ML
1 INJECTION INTRAVENOUS
Status: COMPLETED | OUTPATIENT
Start: 2019-11-13 | End: 2019-11-13

## 2019-11-13 RX ORDER — METHYLPREDNISOLONE SODIUM SUCCINATE 40 MG/ML
40 INJECTION, POWDER, LYOPHILIZED, FOR SOLUTION INTRAMUSCULAR; INTRAVENOUS EVERY 6 HOURS
Status: COMPLETED | OUTPATIENT
Start: 2019-11-13 | End: 2019-11-15

## 2019-11-13 RX ORDER — DEXTROSE MONOHYDRATE 50 MG/ML
100 INJECTION, SOLUTION INTRAVENOUS PRN
Status: DISCONTINUED | OUTPATIENT
Start: 2019-11-13 | End: 2019-11-15 | Stop reason: HOSPADM

## 2019-11-13 RX ORDER — KETOROLAC TROMETHAMINE 15 MG/ML
15 INJECTION, SOLUTION INTRAMUSCULAR; INTRAVENOUS ONCE
Status: DISCONTINUED | OUTPATIENT
Start: 2019-11-13 | End: 2019-11-13

## 2019-11-13 RX ORDER — NICOTINE POLACRILEX 4 MG
15 LOZENGE BUCCAL PRN
Status: DISCONTINUED | OUTPATIENT
Start: 2019-11-13 | End: 2019-11-15 | Stop reason: HOSPADM

## 2019-11-13 RX ORDER — DEXTROSE, SODIUM CHLORIDE, AND POTASSIUM CHLORIDE 5; .45; .15 G/100ML; G/100ML; G/100ML
INJECTION INTRAVENOUS CONTINUOUS
Status: DISCONTINUED | OUTPATIENT
Start: 2019-11-13 | End: 2019-11-13

## 2019-11-13 RX ORDER — ALBUTEROL SULFATE 2.5 MG/3ML
2.5 SOLUTION RESPIRATORY (INHALATION)
Status: DISCONTINUED | OUTPATIENT
Start: 2019-11-13 | End: 2019-11-13

## 2019-11-13 RX ORDER — NITROGLYCERIN 0.4 MG/1
0.4 TABLET SUBLINGUAL ONCE
Status: COMPLETED | OUTPATIENT
Start: 2019-11-13 | End: 2019-11-13

## 2019-11-13 RX ORDER — FOLIC ACID 1 MG/1
1 TABLET ORAL DAILY
Status: DISCONTINUED | OUTPATIENT
Start: 2019-11-13 | End: 2019-11-15 | Stop reason: HOSPADM

## 2019-11-13 RX ORDER — MIDAZOLAM HYDROCHLORIDE 1 MG/ML
1 INJECTION INTRAMUSCULAR; INTRAVENOUS ONCE
Status: COMPLETED | OUTPATIENT
Start: 2019-11-13 | End: 2019-11-13

## 2019-11-13 RX ORDER — CHOLECALCIFEROL (VITAMIN D3) 125 MCG
250 CAPSULE ORAL DAILY
Status: DISCONTINUED | OUTPATIENT
Start: 2019-11-13 | End: 2019-11-15 | Stop reason: HOSPADM

## 2019-11-13 RX ORDER — ALBUTEROL SULFATE 2.5 MG/3ML
10 SOLUTION RESPIRATORY (INHALATION) EVERY 4 HOURS PRN
Status: DISCONTINUED | OUTPATIENT
Start: 2019-11-13 | End: 2019-11-13

## 2019-11-13 RX ORDER — ONDANSETRON 2 MG/ML
4 INJECTION INTRAMUSCULAR; INTRAVENOUS EVERY 6 HOURS PRN
Status: DISCONTINUED | OUTPATIENT
Start: 2019-11-13 | End: 2019-11-15 | Stop reason: HOSPADM

## 2019-11-13 RX ORDER — CALCIUM CARBONATE 200(500)MG
1000 TABLET,CHEWABLE ORAL 3 TIMES DAILY PRN
Status: DISCONTINUED | OUTPATIENT
Start: 2019-11-13 | End: 2019-11-15 | Stop reason: HOSPADM

## 2019-11-13 RX ORDER — ALBUTEROL SULFATE 90 UG/1
2 AEROSOL, METERED RESPIRATORY (INHALATION)
Status: DISCONTINUED | OUTPATIENT
Start: 2019-11-13 | End: 2019-11-15 | Stop reason: HOSPADM

## 2019-11-13 RX ORDER — DEXTROSE MONOHYDRATE 25 G/50ML
12.5 INJECTION, SOLUTION INTRAVENOUS PRN
Status: DISCONTINUED | OUTPATIENT
Start: 2019-11-13 | End: 2019-11-15 | Stop reason: HOSPADM

## 2019-11-13 RX ORDER — THIAMINE MONONITRATE (VIT B1) 100 MG
100 TABLET ORAL DAILY
Status: DISCONTINUED | OUTPATIENT
Start: 2019-11-13 | End: 2019-11-15 | Stop reason: HOSPADM

## 2019-11-13 RX ORDER — IPRATROPIUM BROMIDE AND ALBUTEROL SULFATE 2.5; .5 MG/3ML; MG/3ML
1 SOLUTION RESPIRATORY (INHALATION)
Status: DISCONTINUED | OUTPATIENT
Start: 2019-11-13 | End: 2019-11-13

## 2019-11-13 RX ORDER — ACETAMINOPHEN 325 MG/1
650 TABLET ORAL EVERY 4 HOURS PRN
Status: DISCONTINUED | OUTPATIENT
Start: 2019-11-13 | End: 2019-11-15 | Stop reason: HOSPADM

## 2019-11-13 RX ORDER — NITROGLYCERIN 20 MG/100ML
50 INJECTION INTRAVENOUS CONTINUOUS
Status: DISCONTINUED | OUTPATIENT
Start: 2019-11-13 | End: 2019-11-13

## 2019-11-13 RX ORDER — METHYLPREDNISOLONE SODIUM SUCCINATE 125 MG/2ML
125 INJECTION, POWDER, LYOPHILIZED, FOR SOLUTION INTRAMUSCULAR; INTRAVENOUS ONCE
Status: DISCONTINUED | OUTPATIENT
Start: 2019-11-13 | End: 2019-11-13

## 2019-11-13 RX ORDER — PANTOPRAZOLE SODIUM 40 MG/1
40 TABLET, DELAYED RELEASE ORAL
Status: DISCONTINUED | OUTPATIENT
Start: 2019-11-13 | End: 2019-11-15 | Stop reason: HOSPADM

## 2019-11-13 RX ORDER — UREA 10 %
1 LOTION (ML) TOPICAL NIGHTLY PRN
Status: DISCONTINUED | OUTPATIENT
Start: 2019-11-13 | End: 2019-11-15 | Stop reason: HOSPADM

## 2019-11-13 RX ORDER — ALBUTEROL SULFATE 2.5 MG/3ML
2.5 SOLUTION RESPIRATORY (INHALATION) EVERY 6 HOURS PRN
Status: DISCONTINUED | OUTPATIENT
Start: 2019-11-13 | End: 2019-11-15 | Stop reason: HOSPADM

## 2019-11-13 RX ORDER — PREDNISONE 20 MG/1
40 TABLET ORAL DAILY
Status: DISCONTINUED | OUTPATIENT
Start: 2019-11-15 | End: 2019-11-15 | Stop reason: HOSPADM

## 2019-11-13 RX ORDER — SODIUM CHLORIDE 0.9 % (FLUSH) 0.9 %
10 SYRINGE (ML) INJECTION EVERY 12 HOURS SCHEDULED
Status: DISCONTINUED | OUTPATIENT
Start: 2019-11-13 | End: 2019-11-15 | Stop reason: HOSPADM

## 2019-11-13 RX ORDER — CARVEDILOL 6.25 MG/1
6.25 TABLET ORAL 2 TIMES DAILY WITH MEALS
Status: DISCONTINUED | OUTPATIENT
Start: 2019-11-13 | End: 2019-11-15 | Stop reason: HOSPADM

## 2019-11-13 RX ORDER — SODIUM CHLORIDE 450 MG/100ML
INJECTION, SOLUTION INTRAVENOUS CONTINUOUS
Status: DISCONTINUED | OUTPATIENT
Start: 2019-11-13 | End: 2019-11-14

## 2019-11-13 RX ORDER — BUTALBITAL, ACETAMINOPHEN AND CAFFEINE 50; 325; 40 MG/1; MG/1; MG/1
1 TABLET ORAL EVERY 4 HOURS PRN
Status: DISCONTINUED | OUTPATIENT
Start: 2019-11-13 | End: 2019-11-15 | Stop reason: HOSPADM

## 2019-11-13 RX ORDER — ALBUTEROL SULFATE 2.5 MG/3ML
5 SOLUTION RESPIRATORY (INHALATION)
Status: DISCONTINUED | OUTPATIENT
Start: 2019-11-13 | End: 2019-11-13

## 2019-11-13 RX ORDER — MIDAZOLAM HYDROCHLORIDE 1 MG/ML
1 INJECTION INTRAMUSCULAR; INTRAVENOUS ONCE
Status: DISCONTINUED | OUTPATIENT
Start: 2019-11-13 | End: 2019-11-13

## 2019-11-13 RX ORDER — SODIUM CHLORIDE 0.9 % (FLUSH) 0.9 %
10 SYRINGE (ML) INJECTION PRN
Status: DISCONTINUED | OUTPATIENT
Start: 2019-11-13 | End: 2019-11-15 | Stop reason: HOSPADM

## 2019-11-13 RX ORDER — GABAPENTIN 300 MG/1
300 CAPSULE ORAL 3 TIMES DAILY
Status: DISCONTINUED | OUTPATIENT
Start: 2019-11-13 | End: 2019-11-15 | Stop reason: HOSPADM

## 2019-11-13 RX ORDER — KETOROLAC TROMETHAMINE 15 MG/ML
15 INJECTION, SOLUTION INTRAMUSCULAR; INTRAVENOUS ONCE
Status: COMPLETED | OUTPATIENT
Start: 2019-11-13 | End: 2019-11-13

## 2019-11-13 RX ORDER — DIPHENHYDRAMINE HYDROCHLORIDE 50 MG/ML
25 INJECTION INTRAMUSCULAR; INTRAVENOUS EVERY 6 HOURS PRN
Status: DISCONTINUED | OUTPATIENT
Start: 2019-11-13 | End: 2019-11-15 | Stop reason: HOSPADM

## 2019-11-13 RX ORDER — SODIUM CHLORIDE 9 MG/ML
INJECTION, SOLUTION INTRAVENOUS CONTINUOUS
Status: DISCONTINUED | OUTPATIENT
Start: 2019-11-13 | End: 2019-11-13

## 2019-11-13 RX ORDER — METHYLPREDNISOLONE SODIUM SUCCINATE 125 MG/2ML
125 INJECTION, POWDER, LYOPHILIZED, FOR SOLUTION INTRAMUSCULAR; INTRAVENOUS ONCE
Status: COMPLETED | OUTPATIENT
Start: 2019-11-13 | End: 2019-11-13

## 2019-11-13 RX ORDER — BENZONATATE 100 MG/1
100 CAPSULE ORAL 3 TIMES DAILY PRN
Status: DISCONTINUED | OUTPATIENT
Start: 2019-11-13 | End: 2019-11-15 | Stop reason: HOSPADM

## 2019-11-13 RX ORDER — NICOTINE 21 MG/24HR
1 PATCH, TRANSDERMAL 24 HOURS TRANSDERMAL DAILY PRN
Status: DISCONTINUED | OUTPATIENT
Start: 2019-11-13 | End: 2019-11-15 | Stop reason: HOSPADM

## 2019-11-13 RX ORDER — ALBUTEROL SULFATE 90 UG/1
2 AEROSOL, METERED RESPIRATORY (INHALATION)
Status: DISCONTINUED | OUTPATIENT
Start: 2019-11-13 | End: 2019-11-13

## 2019-11-13 RX ADMIN — IPRATROPIUM BROMIDE AND ALBUTEROL SULFATE 1 AMPULE: .5; 3 SOLUTION RESPIRATORY (INHALATION) at 17:38

## 2019-11-13 RX ADMIN — BUTALBITAL, ACETAMINOPHEN, AND CAFFEINE 1 TABLET: 50; 325; 40 TABLET ORAL at 12:56

## 2019-11-13 RX ADMIN — NITROGLYCERIN 0.4 MG: 0.4 TABLET SUBLINGUAL at 00:55

## 2019-11-13 RX ADMIN — PANTOPRAZOLE SODIUM 40 MG: 40 TABLET, DELAYED RELEASE ORAL at 08:50

## 2019-11-13 RX ADMIN — IPRATROPIUM BROMIDE AND ALBUTEROL SULFATE 1 AMPULE: .5; 3 SOLUTION RESPIRATORY (INHALATION) at 08:01

## 2019-11-13 RX ADMIN — MOMETASONE FUROATE AND FORMOTEROL FUMARATE DIHYDRATE 2 PUFF: 200; 5 AEROSOL RESPIRATORY (INHALATION) at 20:10

## 2019-11-13 RX ADMIN — Medication 250 MCG: at 08:48

## 2019-11-13 RX ADMIN — Medication 10 ML: at 21:03

## 2019-11-13 RX ADMIN — Medication 10 ML: at 08:51

## 2019-11-13 RX ADMIN — PANTOPRAZOLE SODIUM 40 MG: 40 TABLET, DELAYED RELEASE ORAL at 16:41

## 2019-11-13 RX ADMIN — Medication 100 MG: at 08:48

## 2019-11-13 RX ADMIN — ALBUTEROL SULFATE 10 MG: 5 SOLUTION RESPIRATORY (INHALATION) at 03:00

## 2019-11-13 RX ADMIN — ANTACID TABLETS 1000 MG: 500 TABLET, CHEWABLE ORAL at 21:42

## 2019-11-13 RX ADMIN — GABAPENTIN 300 MG: 300 CAPSULE ORAL at 08:47

## 2019-11-13 RX ADMIN — BENZONATATE 100 MG: 100 CAPSULE ORAL at 23:07

## 2019-11-13 RX ADMIN — INSULIN LISPRO 4 UNITS: 100 INJECTION, SOLUTION INTRAVENOUS; SUBCUTANEOUS at 23:12

## 2019-11-13 RX ADMIN — ALBUTEROL SULFATE 5 MG: 5 SOLUTION RESPIRATORY (INHALATION) at 01:02

## 2019-11-13 RX ADMIN — IPRATROPIUM BROMIDE 0.5 MG: 0.5 SOLUTION RESPIRATORY (INHALATION) at 01:02

## 2019-11-13 RX ADMIN — MIDAZOLAM HYDROCHLORIDE 1 MG: 1 INJECTION, SOLUTION INTRAMUSCULAR; INTRAVENOUS at 02:38

## 2019-11-13 RX ADMIN — ACETAMINOPHEN 650 MG: 325 TABLET ORAL at 10:39

## 2019-11-13 RX ADMIN — MAGNESIUM SULFATE HEPTAHYDRATE 1 G: 1 INJECTION, SOLUTION INTRAVENOUS at 10:10

## 2019-11-13 RX ADMIN — METHYLPREDNISOLONE SODIUM SUCCINATE 125 MG: 125 INJECTION, POWDER, FOR SOLUTION INTRAMUSCULAR; INTRAVENOUS at 02:38

## 2019-11-13 RX ADMIN — BUTALBITAL, ACETAMINOPHEN, AND CAFFEINE 1 TABLET: 50; 325; 40 TABLET ORAL at 21:02

## 2019-11-13 RX ADMIN — SODIUM CHLORIDE: 4.5 INJECTION, SOLUTION INTRAVENOUS at 22:27

## 2019-11-13 RX ADMIN — POTASSIUM CHLORIDE, DEXTROSE MONOHYDRATE AND SODIUM CHLORIDE: 150; 5; 450 INJECTION, SOLUTION INTRAVENOUS at 10:10

## 2019-11-13 RX ADMIN — IOHEXOL 75 ML: 350 INJECTION, SOLUTION INTRAVENOUS at 11:37

## 2019-11-13 RX ADMIN — LEVOFLOXACIN 500 MG: 5 INJECTION, SOLUTION INTRAVENOUS at 10:10

## 2019-11-13 RX ADMIN — IPRATROPIUM BROMIDE AND ALBUTEROL SULFATE 1 AMPULE: .5; 3 SOLUTION RESPIRATORY (INHALATION) at 14:08

## 2019-11-13 RX ADMIN — GABAPENTIN 300 MG: 300 CAPSULE ORAL at 14:03

## 2019-11-13 RX ADMIN — ONDANSETRON 4 MG: 2 INJECTION INTRAMUSCULAR; INTRAVENOUS at 16:07

## 2019-11-13 RX ADMIN — METHYLPREDNISOLONE SODIUM SUCCINATE 40 MG: 40 INJECTION, POWDER, FOR SOLUTION INTRAMUSCULAR; INTRAVENOUS at 10:11

## 2019-11-13 RX ADMIN — METHYLPREDNISOLONE SODIUM SUCCINATE 40 MG: 40 INJECTION, POWDER, FOR SOLUTION INTRAMUSCULAR; INTRAVENOUS at 14:22

## 2019-11-13 RX ADMIN — KETOROLAC TROMETHAMINE 15 MG: 15 INJECTION, SOLUTION INTRAMUSCULAR; INTRAVENOUS at 02:39

## 2019-11-13 RX ADMIN — IPRATROPIUM BROMIDE AND ALBUTEROL SULFATE 1 AMPULE: .5; 3 SOLUTION RESPIRATORY (INHALATION) at 20:10

## 2019-11-13 RX ADMIN — MAGNESIUM SULFATE HEPTAHYDRATE 1 G: 1 INJECTION, SOLUTION INTRAVENOUS at 10:18

## 2019-11-13 RX ADMIN — CARVEDILOL 6.25 MG: 6.25 TABLET, FILM COATED ORAL at 08:47

## 2019-11-13 RX ADMIN — ALBUTEROL SULFATE 5 MG: 2.5 SOLUTION RESPIRATORY (INHALATION) at 01:02

## 2019-11-13 RX ADMIN — CARVEDILOL 6.25 MG: 6.25 TABLET, FILM COATED ORAL at 16:41

## 2019-11-13 RX ADMIN — GABAPENTIN 300 MG: 300 CAPSULE ORAL at 21:02

## 2019-11-13 RX ADMIN — METHYLPREDNISOLONE SODIUM SUCCINATE 40 MG: 40 INJECTION, POWDER, FOR SOLUTION INTRAMUSCULAR; INTRAVENOUS at 20:30

## 2019-11-13 ASSESSMENT — PAIN DESCRIPTION - PROGRESSION
CLINICAL_PROGRESSION: NOT CHANGED
CLINICAL_PROGRESSION: NOT CHANGED

## 2019-11-13 ASSESSMENT — PAIN DESCRIPTION - DESCRIPTORS
DESCRIPTORS: ACHING;CONSTANT;HEADACHE
DESCRIPTORS: ACHING;CONSTANT;HEADACHE
DESCRIPTORS: HEADACHE;ACHING
DESCRIPTORS: ACHING;DISCOMFORT
DESCRIPTORS: ACHING;CONSTANT;HEADACHE
DESCRIPTORS: THROBBING;SORE

## 2019-11-13 ASSESSMENT — ENCOUNTER SYMPTOMS
WHEEZING: 0
NAUSEA: 0
PHOTOPHOBIA: 0
RHINORRHEA: 0
COUGH: 1
SHORTNESS OF BREATH: 1
BACK PAIN: 0
ABDOMINAL PAIN: 0
VOMITING: 0

## 2019-11-13 ASSESSMENT — PAIN DESCRIPTION - PAIN TYPE
TYPE: ACUTE PAIN
TYPE: ACUTE PAIN;CHRONIC PAIN
TYPE: ACUTE PAIN
TYPE: ACUTE PAIN
TYPE: ACUTE PAIN;CHRONIC PAIN
TYPE: ACUTE PAIN
TYPE: ACUTE PAIN

## 2019-11-13 ASSESSMENT — PAIN DESCRIPTION - ONSET
ONSET: ON-GOING
ONSET: ON-GOING

## 2019-11-13 ASSESSMENT — PAIN SCALES - GENERAL
PAINLEVEL_OUTOF10: 8
PAINLEVEL_OUTOF10: 7
PAINLEVEL_OUTOF10: 10
PAINLEVEL_OUTOF10: 4
PAINLEVEL_OUTOF10: 7
PAINLEVEL_OUTOF10: 8
PAINLEVEL_OUTOF10: 7
PAINLEVEL_OUTOF10: 9
PAINLEVEL_OUTOF10: 7
PAINLEVEL_OUTOF10: 0
PAINLEVEL_OUTOF10: 7

## 2019-11-13 ASSESSMENT — PAIN DESCRIPTION - LOCATION
LOCATION: CHEST;HEAD
LOCATION: CHEST;HEAD
LOCATION: HEAD
LOCATION: CHEST

## 2019-11-13 ASSESSMENT — PAIN DESCRIPTION - FREQUENCY
FREQUENCY: INTERMITTENT
FREQUENCY: CONTINUOUS

## 2019-11-13 ASSESSMENT — PAIN - FUNCTIONAL ASSESSMENT: PAIN_FUNCTIONAL_ASSESSMENT: ACTIVITIES ARE NOT PREVENTED

## 2019-11-14 PROBLEM — I50.32 CHRONIC DIASTOLIC CONGESTIVE HEART FAILURE (HCC): Status: ACTIVE | Noted: 2019-11-13

## 2019-11-14 PROBLEM — R13.19 OTHER DYSPHAGIA: Status: ACTIVE | Noted: 2019-11-14

## 2019-11-14 LAB
ANION GAP SERPL CALCULATED.3IONS-SCNC: 9 MMOL/L (ref 9–17)
BUN BLDV-MCNC: 19 MG/DL (ref 8–23)
BUN/CREAT BLD: ABNORMAL (ref 9–20)
CALCIUM SERPL-MCNC: 8.5 MG/DL (ref 8.6–10.4)
CHLORIDE BLD-SCNC: 112 MMOL/L (ref 98–107)
CO2: 19 MMOL/L (ref 20–31)
CREAT SERPL-MCNC: 0.54 MG/DL (ref 0.5–0.9)
CULTURE: ABNORMAL
DIRECT EXAM: ABNORMAL
ESTIMATED AVERAGE GLUCOSE: 105 MG/DL
GFR AFRICAN AMERICAN: >60 ML/MIN
GFR NON-AFRICAN AMERICAN: >60 ML/MIN
GFR SERPL CREATININE-BSD FRML MDRD: ABNORMAL ML/MIN/{1.73_M2}
GFR SERPL CREATININE-BSD FRML MDRD: ABNORMAL ML/MIN/{1.73_M2}
GLUCOSE BLD-MCNC: 126 MG/DL (ref 65–105)
GLUCOSE BLD-MCNC: 142 MG/DL (ref 70–99)
GLUCOSE BLD-MCNC: 158 MG/DL (ref 65–105)
GLUCOSE BLD-MCNC: 209 MG/DL (ref 65–105)
HBA1C MFR BLD: 5.3 % (ref 4–6)
HCT VFR BLD CALC: 36.5 % (ref 36.3–47.1)
HEMOGLOBIN: 9.9 G/DL (ref 11.9–15.1)
LACTIC ACID, WHOLE BLOOD: 2.9 MMOL/L (ref 0.7–2.1)
LIPASE: 100 U/L (ref 13–60)
LV EF: 69 %
LVEF MODALITY: NORMAL
Lab: ABNORMAL
MCH RBC QN AUTO: 27.1 PG (ref 25.2–33.5)
MCHC RBC AUTO-ENTMCNC: 27.1 G/DL (ref 28.4–34.8)
MCV RBC AUTO: 100 FL (ref 82.6–102.9)
NRBC AUTOMATED: 0.1 PER 100 WBC
PDW BLD-RTO: 21.5 % (ref 11.8–14.4)
PLATELET # BLD: 385 K/UL (ref 138–453)
PMV BLD AUTO: 9.2 FL (ref 8.1–13.5)
POTASSIUM SERPL-SCNC: 5.2 MMOL/L (ref 3.7–5.3)
RBC # BLD: 3.65 M/UL (ref 3.95–5.11)
SODIUM BLD-SCNC: 140 MMOL/L (ref 135–144)
SPECIMEN DESCRIPTION: ABNORMAL
WBC # BLD: 14.4 K/UL (ref 3.5–11.3)

## 2019-11-14 PROCEDURE — 6370000000 HC RX 637 (ALT 250 FOR IP): Performed by: FAMILY MEDICINE

## 2019-11-14 PROCEDURE — 6370000000 HC RX 637 (ALT 250 FOR IP): Performed by: NURSE PRACTITIONER

## 2019-11-14 PROCEDURE — 83605 ASSAY OF LACTIC ACID: CPT

## 2019-11-14 PROCEDURE — 1200000000 HC SEMI PRIVATE

## 2019-11-14 PROCEDURE — 99232 SBSQ HOSP IP/OBS MODERATE 35: CPT | Performed by: FAMILY MEDICINE

## 2019-11-14 PROCEDURE — 6360000002 HC RX W HCPCS: Performed by: NURSE PRACTITIONER

## 2019-11-14 PROCEDURE — 94640 AIRWAY INHALATION TREATMENT: CPT

## 2019-11-14 PROCEDURE — 93306 TTE W/DOPPLER COMPLETE: CPT

## 2019-11-14 PROCEDURE — 6360000002 HC RX W HCPCS: Performed by: FAMILY MEDICINE

## 2019-11-14 PROCEDURE — 2700000000 HC OXYGEN THERAPY PER DAY

## 2019-11-14 PROCEDURE — 80048 BASIC METABOLIC PNL TOTAL CA: CPT

## 2019-11-14 PROCEDURE — 85027 COMPLETE CBC AUTOMATED: CPT

## 2019-11-14 PROCEDURE — 2580000003 HC RX 258: Performed by: FAMILY MEDICINE

## 2019-11-14 PROCEDURE — 82947 ASSAY GLUCOSE BLOOD QUANT: CPT

## 2019-11-14 PROCEDURE — 36415 COLL VENOUS BLD VENIPUNCTURE: CPT

## 2019-11-14 PROCEDURE — 83690 ASSAY OF LIPASE: CPT

## 2019-11-14 RX ORDER — INSULIN GLARGINE 100 [IU]/ML
20 INJECTION, SOLUTION SUBCUTANEOUS NIGHTLY
Status: DISCONTINUED | OUTPATIENT
Start: 2019-11-14 | End: 2019-11-15 | Stop reason: HOSPADM

## 2019-11-14 RX ORDER — SODIUM CHLORIDE, SODIUM LACTATE, POTASSIUM CHLORIDE, CALCIUM CHLORIDE 600; 310; 30; 20 MG/100ML; MG/100ML; MG/100ML; MG/100ML
INJECTION, SOLUTION INTRAVENOUS CONTINUOUS
Status: DISCONTINUED | OUTPATIENT
Start: 2019-11-14 | End: 2019-11-15 | Stop reason: HOSPADM

## 2019-11-14 RX ADMIN — GABAPENTIN 300 MG: 300 CAPSULE ORAL at 08:46

## 2019-11-14 RX ADMIN — LEVOFLOXACIN 500 MG: 5 INJECTION, SOLUTION INTRAVENOUS at 12:21

## 2019-11-14 RX ADMIN — BUTALBITAL, ACETAMINOPHEN, AND CAFFEINE 1 TABLET: 50; 325; 40 TABLET ORAL at 16:49

## 2019-11-14 RX ADMIN — METHYLPREDNISOLONE SODIUM SUCCINATE 40 MG: 40 INJECTION, POWDER, FOR SOLUTION INTRAMUSCULAR; INTRAVENOUS at 01:50

## 2019-11-14 RX ADMIN — SODIUM CHLORIDE, POTASSIUM CHLORIDE, SODIUM LACTATE AND CALCIUM CHLORIDE: 600; 310; 30; 20 INJECTION, SOLUTION INTRAVENOUS at 08:32

## 2019-11-14 RX ADMIN — PANTOPRAZOLE SODIUM 40 MG: 40 TABLET, DELAYED RELEASE ORAL at 06:32

## 2019-11-14 RX ADMIN — METHYLPREDNISOLONE SODIUM SUCCINATE 40 MG: 40 INJECTION, POWDER, FOR SOLUTION INTRAMUSCULAR; INTRAVENOUS at 22:34

## 2019-11-14 RX ADMIN — Medication 100 MG: at 08:47

## 2019-11-14 RX ADMIN — IPRATROPIUM BROMIDE AND ALBUTEROL SULFATE 1 AMPULE: .5; 3 SOLUTION RESPIRATORY (INHALATION) at 16:33

## 2019-11-14 RX ADMIN — INSULIN GLARGINE 20 UNITS: 100 INJECTION, SOLUTION SUBCUTANEOUS at 22:59

## 2019-11-14 RX ADMIN — BENZONATATE 100 MG: 100 CAPSULE ORAL at 08:47

## 2019-11-14 RX ADMIN — CARVEDILOL 6.25 MG: 6.25 TABLET, FILM COATED ORAL at 16:49

## 2019-11-14 RX ADMIN — METHYLPREDNISOLONE SODIUM SUCCINATE 40 MG: 40 INJECTION, POWDER, FOR SOLUTION INTRAMUSCULAR; INTRAVENOUS at 14:39

## 2019-11-14 RX ADMIN — BUTALBITAL, ACETAMINOPHEN, AND CAFFEINE 1 TABLET: 50; 325; 40 TABLET ORAL at 08:27

## 2019-11-14 RX ADMIN — BUTALBITAL, ACETAMINOPHEN, AND CAFFEINE 1 TABLET: 50; 325; 40 TABLET ORAL at 02:08

## 2019-11-14 RX ADMIN — ONDANSETRON 4 MG: 2 INJECTION INTRAMUSCULAR; INTRAVENOUS at 08:45

## 2019-11-14 RX ADMIN — INSULIN LISPRO 2 UNITS: 100 INJECTION, SOLUTION INTRAVENOUS; SUBCUTANEOUS at 22:35

## 2019-11-14 RX ADMIN — ONDANSETRON 4 MG: 2 INJECTION INTRAMUSCULAR; INTRAVENOUS at 14:39

## 2019-11-14 RX ADMIN — ANTACID TABLETS 1000 MG: 500 TABLET, CHEWABLE ORAL at 16:49

## 2019-11-14 RX ADMIN — PANTOPRAZOLE SODIUM 40 MG: 40 TABLET, DELAYED RELEASE ORAL at 16:49

## 2019-11-14 RX ADMIN — MOMETASONE FUROATE AND FORMOTEROL FUMARATE DIHYDRATE 2 PUFF: 200; 5 AEROSOL RESPIRATORY (INHALATION) at 07:52

## 2019-11-14 RX ADMIN — ANTACID TABLETS 1000 MG: 500 TABLET, CHEWABLE ORAL at 04:07

## 2019-11-14 RX ADMIN — GABAPENTIN 300 MG: 300 CAPSULE ORAL at 14:39

## 2019-11-14 RX ADMIN — INSULIN LISPRO 2 UNITS: 100 INJECTION, SOLUTION INTRAVENOUS; SUBCUTANEOUS at 08:55

## 2019-11-14 RX ADMIN — IPRATROPIUM BROMIDE AND ALBUTEROL SULFATE 1 AMPULE: .5; 3 SOLUTION RESPIRATORY (INHALATION) at 07:52

## 2019-11-14 RX ADMIN — Medication 250 MCG: at 08:46

## 2019-11-14 RX ADMIN — INSULIN LISPRO 2 UNITS: 100 INJECTION, SOLUTION INTRAVENOUS; SUBCUTANEOUS at 16:51

## 2019-11-14 RX ADMIN — BUTALBITAL, ACETAMINOPHEN, AND CAFFEINE 1 TABLET: 50; 325; 40 TABLET ORAL at 22:34

## 2019-11-14 RX ADMIN — METHYLPREDNISOLONE SODIUM SUCCINATE 40 MG: 40 INJECTION, POWDER, FOR SOLUTION INTRAMUSCULAR; INTRAVENOUS at 08:46

## 2019-11-14 RX ADMIN — IPRATROPIUM BROMIDE AND ALBUTEROL SULFATE 1 AMPULE: .5; 3 SOLUTION RESPIRATORY (INHALATION) at 11:19

## 2019-11-14 RX ADMIN — ANTACID TABLETS 1000 MG: 500 TABLET, CHEWABLE ORAL at 08:44

## 2019-11-14 RX ADMIN — GABAPENTIN 300 MG: 300 CAPSULE ORAL at 22:34

## 2019-11-14 RX ADMIN — CARVEDILOL 6.25 MG: 6.25 TABLET, FILM COATED ORAL at 08:46

## 2019-11-14 ASSESSMENT — PAIN SCALES - GENERAL
PAINLEVEL_OUTOF10: 8
PAINLEVEL_OUTOF10: 0
PAINLEVEL_OUTOF10: 0
PAINLEVEL_OUTOF10: 6
PAINLEVEL_OUTOF10: 3
PAINLEVEL_OUTOF10: 5
PAINLEVEL_OUTOF10: 7
PAINLEVEL_OUTOF10: 7

## 2019-11-14 ASSESSMENT — PAIN DESCRIPTION - PROGRESSION: CLINICAL_PROGRESSION: NOT CHANGED

## 2019-11-14 ASSESSMENT — PAIN DESCRIPTION - PAIN TYPE: TYPE: ACUTE PAIN;CHRONIC PAIN

## 2019-11-14 ASSESSMENT — PAIN DESCRIPTION - LOCATION: LOCATION: CHEST;HEAD

## 2019-11-14 ASSESSMENT — PAIN DESCRIPTION - ONSET: ONSET: ON-GOING

## 2019-11-14 ASSESSMENT — PAIN DESCRIPTION - DESCRIPTORS: DESCRIPTORS: BURNING;THROBBING

## 2019-11-14 ASSESSMENT — PAIN DESCRIPTION - FREQUENCY: FREQUENCY: INTERMITTENT

## 2019-11-14 ASSESSMENT — PAIN - FUNCTIONAL ASSESSMENT: PAIN_FUNCTIONAL_ASSESSMENT: ACTIVITIES ARE NOT PREVENTED

## 2019-11-15 VITALS
SYSTOLIC BLOOD PRESSURE: 168 MMHG | WEIGHT: 104.5 LBS | TEMPERATURE: 99 F | OXYGEN SATURATION: 92 % | HEIGHT: 60 IN | RESPIRATION RATE: 20 BRPM | BODY MASS INDEX: 20.52 KG/M2 | HEART RATE: 78 BPM | DIASTOLIC BLOOD PRESSURE: 88 MMHG

## 2019-11-15 LAB — GLUCOSE BLD-MCNC: 274 MG/DL (ref 65–105)

## 2019-11-15 PROCEDURE — 94640 AIRWAY INHALATION TREATMENT: CPT

## 2019-11-15 PROCEDURE — 6370000000 HC RX 637 (ALT 250 FOR IP): Performed by: NURSE PRACTITIONER

## 2019-11-15 PROCEDURE — 94761 N-INVAS EAR/PLS OXIMETRY MLT: CPT

## 2019-11-15 PROCEDURE — 6360000002 HC RX W HCPCS: Performed by: NURSE PRACTITIONER

## 2019-11-15 PROCEDURE — 82947 ASSAY GLUCOSE BLOOD QUANT: CPT

## 2019-11-15 RX ADMIN — PREDNISONE 40 MG: 20 TABLET ORAL at 08:58

## 2019-11-15 RX ADMIN — PANTOPRAZOLE SODIUM 40 MG: 40 TABLET, DELAYED RELEASE ORAL at 05:32

## 2019-11-15 RX ADMIN — INSULIN LISPRO 6 UNITS: 100 INJECTION, SOLUTION INTRAVENOUS; SUBCUTANEOUS at 09:03

## 2019-11-15 RX ADMIN — METHYLPREDNISOLONE SODIUM SUCCINATE 40 MG: 40 INJECTION, POWDER, FOR SOLUTION INTRAMUSCULAR; INTRAVENOUS at 02:53

## 2019-11-15 RX ADMIN — ENOXAPARIN SODIUM 40 MG: 40 INJECTION SUBCUTANEOUS at 08:59

## 2019-11-15 RX ADMIN — IPRATROPIUM BROMIDE AND ALBUTEROL SULFATE 1 AMPULE: .5; 3 SOLUTION RESPIRATORY (INHALATION) at 08:29

## 2019-11-15 RX ADMIN — GABAPENTIN 300 MG: 300 CAPSULE ORAL at 08:58

## 2019-11-15 RX ADMIN — MOMETASONE FUROATE AND FORMOTEROL FUMARATE DIHYDRATE 2 PUFF: 200; 5 AEROSOL RESPIRATORY (INHALATION) at 08:30

## 2019-11-15 RX ADMIN — Medication 250 MCG: at 08:59

## 2019-11-15 RX ADMIN — CARVEDILOL 6.25 MG: 6.25 TABLET, FILM COATED ORAL at 08:59

## 2019-11-20 ENCOUNTER — HOSPITAL ENCOUNTER (EMERGENCY)
Age: 64
Discharge: HOME | End: 2019-11-20
Attending: EMERGENCY MEDICINE
Payer: MEDICARE

## 2019-11-20 ENCOUNTER — APPOINTMENT (OUTPATIENT)
Dept: GENERAL RADIOLOGY | Age: 64
End: 2019-11-20
Payer: MEDICARE

## 2019-11-20 VITALS
OXYGEN SATURATION: 94 % | TEMPERATURE: 98.9 F | DIASTOLIC BLOOD PRESSURE: 89 MMHG | WEIGHT: 90 LBS | HEART RATE: 115 BPM | RESPIRATION RATE: 20 BRPM | HEIGHT: 60 IN | SYSTOLIC BLOOD PRESSURE: 165 MMHG | BODY MASS INDEX: 17.67 KG/M2

## 2019-11-20 DIAGNOSIS — J44.9 CHRONIC OBSTRUCTIVE PULMONARY DISEASE, UNSPECIFIED COPD TYPE (HCC): Primary | ICD-10-CM

## 2019-11-20 LAB
ABSOLUTE EOS #: 0.26 K/UL (ref 0–0.4)
ABSOLUTE IMMATURE GRANULOCYTE: 0.77 K/UL (ref 0–0.3)
ABSOLUTE LYMPH #: 3.35 K/UL (ref 1–4.8)
ABSOLUTE MONO #: 0.26 K/UL (ref 0.1–0.8)
ALBUMIN SERPL-MCNC: 3.8 G/DL (ref 3.5–5.2)
ALBUMIN/GLOBULIN RATIO: 1 (ref 1–2.5)
ALP BLD-CCNC: 65 U/L (ref 35–104)
ALT SERPL-CCNC: 18 U/L (ref 5–33)
ANION GAP SERPL CALCULATED.3IONS-SCNC: 13 MMOL/L (ref 9–17)
AST SERPL-CCNC: 19 U/L
BASOPHILS # BLD: 0 % (ref 0–2)
BASOPHILS ABSOLUTE: 0 K/UL (ref 0–0.2)
BILIRUB SERPL-MCNC: 0.28 MG/DL (ref 0.3–1.2)
BNP INTERPRETATION: ABNORMAL
BUN BLDV-MCNC: 30 MG/DL (ref 8–23)
BUN/CREAT BLD: ABNORMAL (ref 9–20)
CALCIUM SERPL-MCNC: 8.8 MG/DL (ref 8.6–10.4)
CHLORIDE BLD-SCNC: 111 MMOL/L (ref 98–107)
CO2: 20 MMOL/L (ref 20–31)
CREAT SERPL-MCNC: 0.49 MG/DL (ref 0.5–0.9)
DIFFERENTIAL TYPE: ABNORMAL
EOSINOPHILS RELATIVE PERCENT: 2 % (ref 1–4)
GFR AFRICAN AMERICAN: >60 ML/MIN
GFR NON-AFRICAN AMERICAN: >60 ML/MIN
GFR SERPL CREATININE-BSD FRML MDRD: ABNORMAL ML/MIN/{1.73_M2}
GFR SERPL CREATININE-BSD FRML MDRD: ABNORMAL ML/MIN/{1.73_M2}
GLUCOSE BLD-MCNC: 76 MG/DL (ref 70–99)
HCT VFR BLD CALC: 40.1 % (ref 36.3–47.1)
HEMOGLOBIN: 11.5 G/DL (ref 11.9–15.1)
IMMATURE GRANULOCYTES: 6 %
LYMPHOCYTES # BLD: 26 % (ref 24–44)
MCH RBC QN AUTO: 27 PG (ref 25.2–33.5)
MCHC RBC AUTO-ENTMCNC: 28.7 G/DL (ref 28.4–34.8)
MCV RBC AUTO: 94.1 FL (ref 82.6–102.9)
MONOCYTES # BLD: 2 % (ref 1–7)
MORPHOLOGY: ABNORMAL
MORPHOLOGY: ABNORMAL
NRBC AUTOMATED: 0.2 PER 100 WBC
PDW BLD-RTO: 21.5 % (ref 11.8–14.4)
PLATELET # BLD: 566 K/UL (ref 138–453)
PLATELET ESTIMATE: ABNORMAL
PMV BLD AUTO: 9.3 FL (ref 8.1–13.5)
POTASSIUM SERPL-SCNC: 4.2 MMOL/L (ref 3.7–5.3)
PRO-BNP: 768 PG/ML
RBC # BLD: 4.26 M/UL (ref 3.95–5.11)
RBC # BLD: ABNORMAL 10*6/UL
SEG NEUTROPHILS: 64 % (ref 36–66)
SEGMENTED NEUTROPHILS ABSOLUTE COUNT: 8.26 K/UL (ref 1.8–7.7)
SODIUM BLD-SCNC: 144 MMOL/L (ref 135–144)
TOTAL PROTEIN: 7.6 G/DL (ref 6.4–8.3)
TROPONIN INTERP: NORMAL
TROPONIN T: NORMAL NG/ML
TROPONIN, HIGH SENSITIVITY: 12 NG/L (ref 0–14)
WBC # BLD: 12.9 K/UL (ref 3.5–11.3)
WBC # BLD: ABNORMAL 10*3/UL

## 2019-11-20 PROCEDURE — 83880 ASSAY OF NATRIURETIC PEPTIDE: CPT

## 2019-11-20 PROCEDURE — 99285 EMERGENCY DEPT VISIT HI MDM: CPT

## 2019-11-20 PROCEDURE — 85025 COMPLETE CBC W/AUTO DIFF WBC: CPT

## 2019-11-20 PROCEDURE — 93005 ELECTROCARDIOGRAM TRACING: CPT | Performed by: EMERGENCY MEDICINE

## 2019-11-20 PROCEDURE — 71046 X-RAY EXAM CHEST 2 VIEWS: CPT

## 2019-11-20 PROCEDURE — 93005 ELECTROCARDIOGRAM TRACING: CPT | Performed by: STUDENT IN AN ORGANIZED HEALTH CARE EDUCATION/TRAINING PROGRAM

## 2019-11-20 PROCEDURE — 80053 COMPREHEN METABOLIC PANEL: CPT

## 2019-11-20 PROCEDURE — 84484 ASSAY OF TROPONIN QUANT: CPT

## 2019-11-20 RX ORDER — FUROSEMIDE 10 MG/ML
20 INJECTION INTRAMUSCULAR; INTRAVENOUS ONCE
Status: DISCONTINUED | OUTPATIENT
Start: 2019-11-20 | End: 2019-11-20 | Stop reason: HOSPADM

## 2019-11-20 RX ORDER — ALBUTEROL SULFATE 2.5 MG/3ML
5 SOLUTION RESPIRATORY (INHALATION)
Status: DISCONTINUED | OUTPATIENT
Start: 2019-11-20 | End: 2019-11-20 | Stop reason: HOSPADM

## 2019-11-20 RX ORDER — PROCHLORPERAZINE EDISYLATE 5 MG/ML
10 INJECTION INTRAMUSCULAR; INTRAVENOUS ONCE
Status: DISCONTINUED | OUTPATIENT
Start: 2019-11-20 | End: 2019-11-20 | Stop reason: HOSPADM

## 2019-11-20 RX ORDER — ALBUTEROL SULFATE 90 UG/1
2 AEROSOL, METERED RESPIRATORY (INHALATION)
Status: DISCONTINUED | OUTPATIENT
Start: 2019-11-20 | End: 2019-11-20 | Stop reason: HOSPADM

## 2019-11-20 RX ORDER — DIPHENHYDRAMINE HYDROCHLORIDE 50 MG/ML
25 INJECTION INTRAMUSCULAR; INTRAVENOUS ONCE
Status: DISCONTINUED | OUTPATIENT
Start: 2019-11-20 | End: 2019-11-20 | Stop reason: HOSPADM

## 2019-11-20 RX ORDER — IPRATROPIUM BROMIDE AND ALBUTEROL SULFATE 2.5; .5 MG/3ML; MG/3ML
1 SOLUTION RESPIRATORY (INHALATION)
Status: DISCONTINUED | OUTPATIENT
Start: 2019-11-20 | End: 2019-11-20 | Stop reason: HOSPADM

## 2019-11-20 ASSESSMENT — ENCOUNTER SYMPTOMS
PHOTOPHOBIA: 0
ABDOMINAL PAIN: 0
NAUSEA: 0
BACK PAIN: 0
COUGH: 1
VOMITING: 0
SHORTNESS OF BREATH: 1
RHINORRHEA: 0

## 2019-11-21 LAB
EKG ATRIAL RATE: 106 BPM
EKG ATRIAL RATE: 116 BPM
EKG ATRIAL RATE: 118 BPM
EKG P AXIS: 61 DEGREES
EKG P AXIS: 74 DEGREES
EKG P AXIS: 75 DEGREES
EKG P-R INTERVAL: 126 MS
EKG P-R INTERVAL: 126 MS
EKG P-R INTERVAL: 130 MS
EKG Q-T INTERVAL: 318 MS
EKG Q-T INTERVAL: 330 MS
EKG Q-T INTERVAL: 334 MS
EKG QRS DURATION: 66 MS
EKG QRS DURATION: 66 MS
EKG QRS DURATION: 68 MS
EKG QTC CALCULATION (BAZETT): 422 MS
EKG QTC CALCULATION (BAZETT): 462 MS
EKG QTC CALCULATION (BAZETT): 464 MS
EKG R AXIS: 73 DEGREES
EKG R AXIS: 77 DEGREES
EKG R AXIS: 79 DEGREES
EKG T AXIS: 10 DEGREES
EKG T AXIS: 12 DEGREES
EKG T AXIS: 27 DEGREES
EKG VENTRICULAR RATE: 106 BPM
EKG VENTRICULAR RATE: 116 BPM
EKG VENTRICULAR RATE: 118 BPM

## 2019-11-21 PROCEDURE — 93010 ELECTROCARDIOGRAM REPORT: CPT | Performed by: INTERNAL MEDICINE

## 2020-01-09 ENCOUNTER — HOSPITAL ENCOUNTER (EMERGENCY)
Age: 65
Discharge: HOME | End: 2020-01-09
Attending: EMERGENCY MEDICINE
Payer: MEDICARE

## 2020-01-09 ENCOUNTER — APPOINTMENT (OUTPATIENT)
Dept: GENERAL RADIOLOGY | Age: 65
End: 2020-01-09
Payer: MEDICARE

## 2020-01-09 VITALS
BODY MASS INDEX: 18.14 KG/M2 | HEIGHT: 59 IN | RESPIRATION RATE: 19 BRPM | TEMPERATURE: 98.5 F | HEART RATE: 93 BPM | SYSTOLIC BLOOD PRESSURE: 159 MMHG | OXYGEN SATURATION: 97 % | DIASTOLIC BLOOD PRESSURE: 96 MMHG | WEIGHT: 90 LBS

## 2020-01-09 LAB
ABSOLUTE EOS #: 0.05 K/UL (ref 0–0.44)
ABSOLUTE IMMATURE GRANULOCYTE: 0.05 K/UL (ref 0–0.3)
ABSOLUTE LYMPH #: 2.14 K/UL (ref 1.1–3.7)
ABSOLUTE MONO #: 0.79 K/UL (ref 0.1–1.2)
ALBUMIN SERPL-MCNC: 3.7 G/DL (ref 3.5–5.2)
ALBUMIN/GLOBULIN RATIO: 0.9 (ref 1–2.5)
ALP BLD-CCNC: 134 U/L (ref 35–104)
ALT SERPL-CCNC: 27 U/L (ref 5–33)
ANION GAP SERPL CALCULATED.3IONS-SCNC: 12 MMOL/L (ref 9–17)
AST SERPL-CCNC: 41 U/L
BASOPHILS # BLD: 0 % (ref 0–2)
BASOPHILS ABSOLUTE: <0.03 K/UL (ref 0–0.2)
BILIRUB SERPL-MCNC: 0.28 MG/DL (ref 0.3–1.2)
BNP INTERPRETATION: NORMAL
BUN BLDV-MCNC: 14 MG/DL (ref 8–23)
BUN/CREAT BLD: ABNORMAL (ref 9–20)
CALCIUM SERPL-MCNC: 8.6 MG/DL (ref 8.6–10.4)
CHLORIDE BLD-SCNC: 109 MMOL/L (ref 98–107)
CO2: 19 MMOL/L (ref 20–31)
CREAT SERPL-MCNC: 0.54 MG/DL (ref 0.5–0.9)
DIFFERENTIAL TYPE: ABNORMAL
DIRECT EXAM: NORMAL
EOSINOPHILS RELATIVE PERCENT: 1 % (ref 1–4)
GFR AFRICAN AMERICAN: >60 ML/MIN
GFR NON-AFRICAN AMERICAN: >60 ML/MIN
GFR SERPL CREATININE-BSD FRML MDRD: ABNORMAL ML/MIN/{1.73_M2}
GFR SERPL CREATININE-BSD FRML MDRD: ABNORMAL ML/MIN/{1.73_M2}
GLUCOSE BLD-MCNC: 89 MG/DL (ref 70–99)
HCT VFR BLD CALC: 38.7 % (ref 36.3–47.1)
HEMOGLOBIN: 11.4 G/DL (ref 11.9–15.1)
IMMATURE GRANULOCYTES: 1 %
LYMPHOCYTES # BLD: 42 % (ref 24–43)
Lab: NORMAL
MCH RBC QN AUTO: 26.9 PG (ref 25.2–33.5)
MCHC RBC AUTO-ENTMCNC: 29.5 G/DL (ref 28.4–34.8)
MCV RBC AUTO: 91.3 FL (ref 82.6–102.9)
MONOCYTES # BLD: 16 % (ref 3–12)
NRBC AUTOMATED: 0 PER 100 WBC
PDW BLD-RTO: 17.9 % (ref 11.8–14.4)
PLATELET # BLD: 533 K/UL (ref 138–453)
PLATELET ESTIMATE: ABNORMAL
PMV BLD AUTO: 8.9 FL (ref 8.1–13.5)
POTASSIUM SERPL-SCNC: 3.5 MMOL/L (ref 3.7–5.3)
PRO-BNP: 53 PG/ML
RBC # BLD: 4.24 M/UL (ref 3.95–5.11)
RBC # BLD: ABNORMAL 10*6/UL
SEG NEUTROPHILS: 40 % (ref 36–65)
SEGMENTED NEUTROPHILS ABSOLUTE COUNT: 2.04 K/UL (ref 1.5–8.1)
SODIUM BLD-SCNC: 140 MMOL/L (ref 135–144)
SPECIMEN DESCRIPTION: NORMAL
TOTAL PROTEIN: 7.7 G/DL (ref 6.4–8.3)
TROPONIN INTERP: NORMAL
TROPONIN INTERP: NORMAL
TROPONIN T: NORMAL NG/ML
TROPONIN T: NORMAL NG/ML
TROPONIN, HIGH SENSITIVITY: 8 NG/L (ref 0–14)
TROPONIN, HIGH SENSITIVITY: 9 NG/L (ref 0–14)
TSH SERPL DL<=0.05 MIU/L-ACNC: 0.65 MIU/L (ref 0.3–5)
WBC # BLD: 5.1 K/UL (ref 3.5–11.3)
WBC # BLD: ABNORMAL 10*3/UL

## 2020-01-09 PROCEDURE — 94664 DEMO&/EVAL PT USE INHALER: CPT

## 2020-01-09 PROCEDURE — 99285 EMERGENCY DEPT VISIT HI MDM: CPT

## 2020-01-09 PROCEDURE — 84484 ASSAY OF TROPONIN QUANT: CPT

## 2020-01-09 PROCEDURE — 84443 ASSAY THYROID STIM HORMONE: CPT

## 2020-01-09 PROCEDURE — 85025 COMPLETE CBC W/AUTO DIFF WBC: CPT

## 2020-01-09 PROCEDURE — 87804 INFLUENZA ASSAY W/OPTIC: CPT

## 2020-01-09 PROCEDURE — 6360000002 HC RX W HCPCS: Performed by: STUDENT IN AN ORGANIZED HEALTH CARE EDUCATION/TRAINING PROGRAM

## 2020-01-09 PROCEDURE — 94640 AIRWAY INHALATION TREATMENT: CPT

## 2020-01-09 PROCEDURE — 93005 ELECTROCARDIOGRAM TRACING: CPT | Performed by: STUDENT IN AN ORGANIZED HEALTH CARE EDUCATION/TRAINING PROGRAM

## 2020-01-09 PROCEDURE — 6370000000 HC RX 637 (ALT 250 FOR IP): Performed by: STUDENT IN AN ORGANIZED HEALTH CARE EDUCATION/TRAINING PROGRAM

## 2020-01-09 PROCEDURE — 80053 COMPREHEN METABOLIC PANEL: CPT

## 2020-01-09 PROCEDURE — 71046 X-RAY EXAM CHEST 2 VIEWS: CPT

## 2020-01-09 PROCEDURE — 83880 ASSAY OF NATRIURETIC PEPTIDE: CPT

## 2020-01-09 RX ORDER — PREDNISONE 10 MG/1
TABLET ORAL
Qty: 20 TABLET | Refills: 0 | Status: SHIPPED | OUTPATIENT
Start: 2020-01-09 | End: 2020-01-19

## 2020-01-09 RX ORDER — ALBUTEROL SULFATE 90 UG/1
1-2 AEROSOL, METERED RESPIRATORY (INHALATION) ONCE
Qty: 1 INHALER | Refills: 0 | Status: SHIPPED | OUTPATIENT
Start: 2020-01-09 | End: 2021-03-13 | Stop reason: SDUPTHER

## 2020-01-09 RX ORDER — ALBUTEROL SULFATE 90 UG/1
2 AEROSOL, METERED RESPIRATORY (INHALATION)
Status: DISCONTINUED | OUTPATIENT
Start: 2020-01-09 | End: 2020-01-09 | Stop reason: HOSPADM

## 2020-01-09 RX ORDER — PREDNISONE 20 MG/1
60 TABLET ORAL ONCE
Status: COMPLETED | OUTPATIENT
Start: 2020-01-09 | End: 2020-01-09

## 2020-01-09 RX ORDER — ACETAMINOPHEN 500 MG
1000 TABLET ORAL ONCE
Status: COMPLETED | OUTPATIENT
Start: 2020-01-09 | End: 2020-01-09

## 2020-01-09 RX ORDER — ALBUTEROL SULFATE 2.5 MG/3ML
5 SOLUTION RESPIRATORY (INHALATION)
Status: DISCONTINUED | OUTPATIENT
Start: 2020-01-09 | End: 2020-01-09 | Stop reason: HOSPADM

## 2020-01-09 RX ORDER — ALBUTEROL SULFATE 90 UG/1
2 AEROSOL, METERED RESPIRATORY (INHALATION) EVERY 6 HOURS PRN
Status: DISCONTINUED | OUTPATIENT
Start: 2020-01-09 | End: 2020-01-09 | Stop reason: HOSPADM

## 2020-01-09 RX ADMIN — PREDNISONE 60 MG: 20 TABLET ORAL at 11:44

## 2020-01-09 RX ADMIN — ALBUTEROL SULFATE 2.5 MG: 2.5 SOLUTION RESPIRATORY (INHALATION) at 11:30

## 2020-01-09 RX ADMIN — ACETAMINOPHEN 1000 MG: 500 TABLET ORAL at 11:44

## 2020-01-09 ASSESSMENT — PAIN DESCRIPTION - DESCRIPTORS: DESCRIPTORS: THROBBING

## 2020-01-09 ASSESSMENT — ENCOUNTER SYMPTOMS
SHORTNESS OF BREATH: 1
DIARRHEA: 0
COUGH: 1
VOMITING: 0
ABDOMINAL PAIN: 0
SORE THROAT: 0
NAUSEA: 0

## 2020-01-09 ASSESSMENT — PAIN SCALES - GENERAL
PAINLEVEL_OUTOF10: 8
PAINLEVEL_OUTOF10: 7

## 2020-01-09 ASSESSMENT — PAIN DESCRIPTION - FREQUENCY: FREQUENCY: INTERMITTENT

## 2020-01-09 ASSESSMENT — PAIN DESCRIPTION - LOCATION: LOCATION: HEAD

## 2020-01-09 ASSESSMENT — PAIN DESCRIPTION - PAIN TYPE: TYPE: CHRONIC PAIN

## 2020-01-09 NOTE — ED PROVIDER NOTES
nondistended. Calves nontender nonswollen.     Impression: Shortness of breath, suspect COPD exacerbation    Plan: Cardiac work-up, flu swab, symptomatic treatment, reassess    EKG Interpretation  EKG Interpretation    Interpreted by emergency department physician    Rhythm: sinus tachycardia  Rate: 100-110  Axis: normal  Ectopy: none  Conduction: normal  ST Segments: normal  T Waves: T wave inversion in V6 compared with prior EKG November 2019 there is improvement in T wave inversions seen in the lateral leads  Q Waves: none    EKG  Impression: sinus tachycardia    Jayne Aguirre MD      Interpreted by me    Kellie Green MD, [facility]  Attending Emergency Physician        Jayne Aguirre MD  01/09/20 2214

## 2020-01-09 NOTE — DISCHARGE INSTR - COC
Continuity of Care Form    Patient Name: Toan Brown   :  1955  MRN:  [id number]    Admit date:  2020  Discharge date:  2020  Code Status Order: Prior   Advance Directives:     Admitting Physician:  No admitting provider for patient encounter. PCP: No primary care provider on file. Discharging Nurse:   [address] Unit/Room#:   Discharging Unit Phone Number: [phone]    Emergency Contact:   Extended Emergency Contact Information  Primary Emergency Contact: Mikayla Bart [address] Phone: [phone]  Mobile Phone: [phone]  Relation: Brother/Sister   needed? No    Past Surgical History:  Past Surgical History:   Procedure Laterality Date    APPENDECTOMY  2017     SECTION      GASTRIC BYPASS SURGERY      HIP SURGERY Right 2019    HIP TOTAL ARTHROPLASTY ANTERIOR APPROACH - [facility],  C-ARM, (Right )    LAPAROSCOPIC APPENDECTOMY N/A 3/27/2017    APPENDECTOMY LAPAROSCOPIC performed by Linda Gonzalez MD at [address] [facility]      hgsil    AR COLONOSCOPY W/BIOPSY SINGLE/MULTIPLE N/A 11/10/2018    COLONOSCOPY WITH BIOPSY performed by Giacomo Zamorano MD at [facility]    AR EGD TRANSORAL BIOPSY SINGLE/MULTIPLE N/A 2018    EGD ESOPHAGOGASTRODUODENOSCOPY performed by Gurjit Angel MD at [facility] Right 2019    HIP TOTAL ARTHROPLASTY ANTERIOR APPROACH - MEDACTA,  C-ARM, performed by Elysia Villalpando DO at [facility]      oesophagitis, candidiasis    UPPER GASTROINTESTINAL ENDOSCOPY  2018    UPPER GASTROINTESTINAL ENDOSCOPY N/A 2018    EGD CONTROL HEMORRHAGE performed by Gurjit Angel MD at [place] [address]       Immunization History: There is no immunization history for the selected administration types on file for this patient.     Active Problems:  Patient Active Problem List   Diagnosis Code    Essential hypertension I10    Hepatitis C B19.20    History of substance abuse ([facility] [address] F19.11    Chronic heart failure with reduced ejection fraction and diastolic dysfunction (HCC) I50.42    COPD (chronic obstructive pulmonary disease) (HCC) X90.3    Alcoholic cirrhosis of liver without ascites (HCC) K70.30    PUD (peptic ulcer disease) K27.9    Anemia D64.9    Duodenal ulcer with hemorrhage K26.4    Psychosis, intermittent requring antipsychotic meds F29    Opiate withdrawal (HCC) F11.23    Severe sepsis (HCC) A41.9, R65.20    Pancreas head lesion S36.200A    Bacteremia R78.81    Polysubstance abuse (HCC) F19.10    Cerebrovascular accident (CVA) ([facility] [address] I63.9    Stroke-like symptoms R29.90    Chest pain R07.9    Abdominal pain R10.9    Syncope and collapse R55    Avascular necrosis of bone of right hip (HCC) M87.051    Closed fracture of head of right femur (HCC) S72.051A    COPD without exacerbation (HCC) J44.9    History of hepatitis C Z86.19    Thrombocytosis (HCC) D47.3    Postoperative anemia due to acute blood loss D62    Acute cystitis without hematuria N30.00    Avascular necrosis (HCC) M87.00    Fever and chills R50.9    Elevated procalcitonin R79.89    CRP elevated R79.82    Transaminitis R74.0    Hyperbilirubinemia E80.6    Acute pancreatitis without infection or necrosis K85.90    History of total right hip replacement Z96.641    Severe malnutrition (HCC) E43    COPD exacerbation (HCC) J44.1    Acute on chronic respiratory failure with hypoxemia (HCC) J96.21    Elevated lactic acid level R79.89    Chronic diastolic congestive heart failure (HCC) I50.32    Elevated brain natriuretic peptide (BNP) level R79.89    Sinus tachycardia R00.0    Other dysphagia R13.19       Isolation/Infection:   Isolation          No Isolation        Patient Infection Status     None to display          Nurse Assessment:  Last Vital Signs: BP (!) 159/96   Pulse 93   Temp 98.5 °F (36.9 °C) (Oral)   Resp 19   Ht 4' 11\" (1.499 m)   Wt 90 lb (40.8 kg)   LMP 03/18/2015   SpO2 97%   BMI 18.18 kg/m²     Last documented pain score (0-10 scale): Pain Level: 8  Last Weight:   Wt Readings from Last 1 Encounters:   01/09/20 90 lb (40.8 kg)     Mental Status:  {IP PT MENTAL STATUS:26030}    IV Access:  - None    Nursing Mobility/ADLs:  Walking   Independent, with rollator  Transfer  Independent  Bathing  Independent  Dressing  Independent  Toileting  Independent  Feeding  Independent  Med Admin  Assisted  Med Delivery       Wound Care Documentation and Therapy:        Elimination:  Continence:   · Bowel: Yes  · Bladder: Yes  Urinary Catheter: None   Colostomy/Ileostomy/Ileal Conduit:        Date of Last BM:   No intake or output data in the 24 hours ending 01/09/20 1234  No intake/output data recorded. Safety Concerns: At Risk for Falls, states she feels weak. Impairments/Disabilities:      None    Nutrition Therapy:  Current Nutrition Therapy:       Routes of Feeding: Oral  Liquids:   Daily Fluid Restriction:   Last Modified Barium Swallow with Video (Video Swallowing Test):     Treatments at the Time of Hospital Discharge:   Respiratory Treatments: ***  Oxygen Therapy:  {Therapy; copd oxygen:19371}  Ventilator:    - No ventilator support    Rehab Therapies: Physical Therapy  Weight Bearing Status/Restrictions: No weight bearing restirctions  Other Medical Equipment (for information only, NOT a DME order): Other Treatments:     Patient's personal belongings (please select all that are sent with patient):      RN SIGNATURE:  Electronically signed by Jack Estrada RN on 1/9/20 at 12:46 PM    CASE MANAGEMENT/SOCIAL WORK SECTION    Inpatient Status Date: ED 1-9-2020, discharged.     Readmission Risk Assessment Score:  Readmission Risk              Risk of Unplanned Readmission:        0           Discharging to Facility/ Agency   Name:   [address] [address]       Phone:

## 2020-01-09 NOTE — PROGRESS NOTES
MDI and spacer instruct done. Pt verbalizes understanding.      Elida Tesfaye, RRT    Trauma Respiratory   1/9/20 12:45 PM

## 2020-01-09 NOTE — ED NOTES
Bed: 07  Expected date:   Expected time:   Means of arrival:   Comments:     Nadeem Antunez RN  01/09/20 9372

## 2020-01-09 NOTE — CARE COORDINATION
Met with patient briefly to discuss discharge plan.   She states she plans to return home, and is agreeable to [facility], will call referral, and fax face sheet.:[phone]  Referral call to [facility] [phone]

## 2020-01-09 NOTE — PROGRESS NOTES
01/09/20 1013   Respiratory   Respiratory Pattern Regular   Respiratory Depth Normal;Shallow   Respiratory Quality/Effort Unlabored   Chest Assessment Chest expansion symmetrical;Trachea midline   L Breath Sounds Diminished   R Breath Sounds Diminished   Additional Respiratory  Assessments   Pulse 93   Resp 19   SpO2 95 %

## 2020-01-09 NOTE — ED PROVIDER NOTES
[address] [facility]  ED  Emergency Department Encounter  EmergencyMedicine Resident     Pt Name:Emi Montes De Oca  MRN: [id number]  Birthdate 1955  Date of evaluation: 1/9/20  PCP:  No primary care provider on file. CHIEF COMPLAINT       Chief Complaint   Patient presents with    Shortness of Breath     flu for 4 weeks, doesn't know how to use home O2    Fatigue       HISTORY OF PRESENT ILLNESS  (Location/Symptom, Timing/Onset, Context/Setting, Quality, Duration, Modifying Factors, Severity.)      Emi Daniels is a 75 y.o. female who presents with shortness of breath, cough, fatigue, and generalized weakness for the past month. Patient reports she has had a persistent nonproductive cough for the past 4 weeks. Increased shortness of breath, with difficulty ambulating without getting short of breath. No chest pain, abdominal pain, nausea or vomiting. She reports feeling generally tired and weak for the past several weeks. No other symptoms; no fevers or chills, headaches, nasal congestion, sore throat. No dysuria or hematuria, diarrhea, constipation, or blood in her stool. No focal weakness or any numbness or tingling. Patient has a history of COPD and CHF. Patient reports she doesn't know how to use her home oxygen, and has been out of her inhaler for the past month. Patient is a poor historian, vague about her symptoms and medical care, and seems uncertain about most of her medications.     PAST MEDICAL / SURGICAL / SOCIAL / FAMILY HISTORY      has a past medical history of Appendicitis, CHF (congestive heart failure) ([address] Chronic respiratory failure with hypoxia and hypercapnia (HCC), Chronic rhinitis, Cigarette smoker, COPD (chronic obstructive pulmonary disease) ([address] Depression, Dysphagia, Essential hypertension, GERD (gastroesophageal reflux disease), Heart failure, diastolic, with acute decompensation ([address] Hepatitis C, chronic ([address] History of smoking at least 1 pack per day Attends meetings of clubs or organizations: Not on file     Relationship status: Not on file    Intimate partner violence:     Fear of current or ex partner: Not on file     Emotionally abused: Not on file     Physically abused: Not on file     Forced sexual activity: Not on file   Other Topics Concern    Not on file   Social History Narrative    Not on file       Family History   Problem Relation Age of Onset    Breast Cancer Mother     Heart Disease Father        Allergies:  Aspirin and Marcina Ruth [cephalexin]    Home Medications:  Prior to Admission medications    Medication Sig Start Date End Date Taking? Authorizing Provider   predniSONE (DELTASONE) 10 MG tablet Take 4 tablets by mouth once daily for 5 days 1/9/20 1/19/20 Yes Ronald Bolden MD   albuterol sulfate HFA (PROVENTIL HFA) 108 (90 Base) MCG/ACT inhaler Inhale 1-2 puffs into the lungs once for 1 dose 1/9/20 1/9/20 Yes Ronald Bolden MD   ondansetron (ZOFRAN ODT) 4 MG disintegrating tablet Take 1 tablet by mouth every 8 hours as needed for Nausea 9/9/19   Laurence Hampton PA-C   docusate sodium (COLACE, DULCOLAX) 100 MG CAPS Take 100 mg by mouth 2 times daily 8/22/19   Chelsi De La Cruz MD   [facility] [place]  [facility]) MISC 1 Device by Does not apply route once for 1 dose 8/5/19 8/5/19  Laurence Hampton PA-C   hydrOXYzine (ATARAX) 25 MG tablet Take 1 tablet by mouth every 8 hours as needed for Itching 8/5/19   Laurence Hampton PA-C   acetaminophen (TYLENOL) 325 MG tablet Take 1 tablet by mouth every 6 hours as needed for Pain 6/22/19   Nirav Miners,    benzonatate (TESSALON PERLES) 100 MG capsule Take 1 capsule by mouth 3 times daily as needed for Cough 3/11/19   Almas Lopez,    carvedilol (COREG) 6.25 MG tablet Take 1 tablet by mouth 2 times daily (with meals) 2/7/19   Carolyn Lares MD   ferrous sulfate (MELISSA-HAO) 325 (65 Fe) MG tablet Take 1 tablet by mouth daily 2/7/19   Carolyn Lares MD   melatonin 1 MG tablet Take 1 tablet by mouth kg/m²     Physical Exam   Gen. Appearance: patient appears well, nondistressed. HEENT: head atraumatic, normocephalic. Pupils equal and reactive to light. Oropharynx clear and moist.  Neck: Supple, normal range of motion. No lymphadenopathy. Pulmonary: Normal respiratory effort, however speaking in short sentences. Slightly dyspneic. Frequent dry cough. Lungs diminished but clear to auscultation bilaterally. Equal air entry right left. Cardiovascular:  Heart sounds normal, no murmurs. Peripheral pulses strong, regular, equal.   Abdomen: Soft, nontender, nondistended. Bowel sounds normal. No palpable masses. Neurology: GCS 15. Oriented to person place and time. Normal tone and power in all 4 extremities. No sensory deficits.     Skin: Warm, dry, well perfused      DIFFERENTIAL  DIAGNOSIS     PLAN (LABS / Alberto Ket / EKG):  Orders Placed This Encounter   Procedures    RAPID INFLUENZA A/B ANTIGENS    XR CHEST STANDARD (2 VW)    CBC Auto Differential    COMPREHENSIVE METABOLIC PANEL    Troponin    TSH with Reflex    Brain Natriuretic Peptide    Troponin    Respiratory Care Evaluation and Treat    Initiate ED Aerosol Protocol    Respiratory care evaluation only    HHN Treatment    HHN Treatment    MDI Treatment    MDI Treatment    HHN Treatment    EKG 12 Lead       MEDICATIONS ORDERED:  Orders Placed This Encounter   Medications    albuterol (PROVENTIL) nebulizer solution 5 mg    albuterol (PROVENTIL) nebulizer solution 5 mg    albuterol sulfate  (90 Base) MCG/ACT inhaler 2 puff    AND Linked Order Group     albuterol sulfate  (90 Base) MCG/ACT inhaler 2 puff     ipratropium (ATROVENT HFA) 17 MCG/ACT inhaler 2 puff    ipratropium (ATROVENT) 0.02 % nebulizer solution 0.5 mg    acetaminophen (TYLENOL) tablet 1,000 mg    predniSONE (DELTASONE) tablet 60 mg    albuterol sulfate  (90 Base) MCG/ACT inhaler 2 puff    predniSONE (DELTASONE) 10 MG tablet     Sig: Take 4 tablets by mouth once daily for 5 days     Dispense:  20 tablet     Refill:  0    albuterol sulfate HFA (PROVENTIL HFA) 108 (90 Base) MCG/ACT inhaler     Sig: Inhale 1-2 puffs into the lungs once for 1 dose     Dispense:  1 Inhaler     Refill:  0       DDX: Pneumonia, COPD exacerbation, CHF, ACS, viral syndrome    DIAGNOSTIC RESULTS / EMERGENCY DEPARTMENT COURSE / MDM     LABS:  Results for orders placed or performed during the hospital encounter of 01/09/20   RAPID INFLUENZA A/B ANTIGENS   Result Value Ref Range    Specimen Description . NASOPHARYNGEAL SWAB     Special Requests NOT REPORTED     Direct Exam       PRESUMPTIVE NEGATIVE for Influenza A + B antigens. PCR testing to confirm this result is available upon request.  Specimen will be saved in the laboratory for 7 days. Please call [phone] if PCR testing is indicated.    CBC Auto Differential   Result Value Ref Range    WBC 5.1 3.5 - 11.3 k/uL    RBC 4.24 3.95 - 5.11 m/uL    Hemoglobin 11.4 (L) 11.9 - 15.1 g/dL    Hematocrit 38.7 36.3 - 47.1 %    MCV 91.3 82.6 - 102.9 fL    MCH 26.9 25.2 - 33.5 pg    MCHC 29.5 28.4 - 34.8 g/dL    RDW 17.9 (H) 11.8 - 14.4 %    Platelets 275 (H) 112 - 453 k/uL    MPV 8.9 8.1 - 13.5 fL    NRBC Automated 0.0 0.0 per 100 WBC    Differential Type NOT REPORTED     Seg Neutrophils 40 36 - 65 %    Lymphocytes 42 24 - 43 %    Monocytes 16 (H) 3 - 12 %    Eosinophils % 1 1 - 4 %    Basophils 0 0 - 2 %    Immature Granulocytes 1 (H) 0 %    Segs Absolute 2.04 1.50 - 8.10 k/uL    Absolute Lymph # 2.14 1.10 - 3.70 k/uL    Absolute Mono # 0.79 0.10 - 1.20 k/uL    Absolute Eos # 0.05 0.00 - 0.44 k/uL    Basophils Absolute <0.03 0.00 - 0.20 k/uL    Absolute Immature Granulocyte 0.05 0.00 - 0.30 k/uL    WBC Morphology NOT REPORTED     RBC Morphology ANISOCYTOSIS PRESENT     Platelet Estimate NOT REPORTED    COMPREHENSIVE METABOLIC PANEL   Result Value Ref Range    Glucose 89 70 - 99 mg/dL    BUN 14 8 - 23 mg/dL    CREATININE 0.54 0.50 - 0.90 mg/dL    Bun/Cre Ratio NOT REPORTED 9 - 20    Calcium 8.6 8.6 - 10.4 mg/dL    Sodium 140 135 - 144 mmol/L    Potassium 3.5 (L) 3.7 - 5.3 mmol/L    Chloride 109 (H) 98 - 107 mmol/L    CO2 19 (L) 20 - 31 mmol/L    Anion Gap 12 9 - 17 mmol/L    Alkaline Phosphatase 134 (H) 35 - 104 U/L    ALT 27 5 - 33 U/L    AST 41 (H) <32 U/L    Total Bilirubin 0.28 (L) 0.3 - 1.2 mg/dL    Total Protein 7.7 6.4 - 8.3 g/dL    Alb 3.7 3.5 - 5.2 g/dL    Albumin/Globulin Ratio 0.9 (L) 1.0 - 2.5    GFR Non-African American >60 >60 mL/min    GFR African American >60 >60 mL/min    GFR Comment          GFR Staging NOT REPORTED    Troponin   Result Value Ref Range    Troponin, High Sensitivity 9 0 - 14 ng/L    Troponin T NOT REPORTED <0.03 ng/mL    Troponin Interp NOT REPORTED    TSH with Reflex   Result Value Ref Range    TSH 0.65 0.30 - 5.00 mIU/L   Brain Natriuretic Peptide   Result Value Ref Range    Pro-BNP 53 <300 pg/mL    BNP Interpretation Pro-BNP Reference Range:    Troponin   Result Value Ref Range    Troponin, High Sensitivity 8 0 - 14 ng/L    Troponin T NOT REPORTED <0.03 ng/mL    Troponin Interp NOT REPORTED        IMPRESSION: 75year old patient presents with persistent cough and shortness of breath for the past 4 weeks, along with fatigue and generalized weakness. Patient is afebrile, hemodynamically stable, and in no acute distress. Appropriately interactive and cooperative with interview and examination. Dyspneic during conversation, speaking in short sentences. Otherwise in no acute distress. She has normal respiratory effort otherwise. Diminished breath sounds bilaterally; no significant wheeze and no coarse breath sounds. Heart sounds normal, abdomen benign, neurologically normal.  No lower extremity edema. No chest pain, hemoptysis, hypoxia, or any other symptoms or evidence on exam of pulmonary embolism. No clinical findings of CHF exacerbation.   Constellation of symptoms sound viral in nature, and with history of COPD and noncompliance with breathing treatments, I suspect COPD exacerbation. Plan for cardiac rule out, chest x-ray, breathing treatment, steroid. Reassess. RADIOLOGY:  See radiology report    EKG  EKG Interpretation    Interpreted by me    Rhythm: Sinus tachycardia  Rate: Tachycardic  Axis: normal  Ectopy: none  Conduction: normal  ST Segments: no acute change  T Waves: Inversion V6  Q Waves: none    Clinical Impression: no acute changes     All EKG's are interpreted by the Emergency Department Physician who either signs or Co-signs this chart in the absence of a cardiologist.    EMERGENCY DEPARTMENT COURSE:    Laboratory investigations with no clinically significant changes. Chest x-ray unremarkable. EKG at patient's baseline. Patient reassessed after respiratory treatment. She is comfortable, speaking in full sentences, normal SPO2 on room air, no respiratory distress whatsoever. She does have ongoing cough. Plan for prednisone, albuterol refill. Patient lives alone and seems uncertain about most of her medical problems and has been out of her breathing treatments at home, I have requested home health evaluation. Case management has met with patient and initiated this process. Patient was counseled to follow up with their Primary Care Provider as soon as possible for an ER follow-up visit. Patient counseled to return to the Emergency Department for any worsening symptoms or for any other cares or concerns. Patient verbalized understanding and agreement with plan. Discharged to home in stable condition and in no distress.     PROCEDURES:  None    CONSULTS:  None    CRITICAL CARE:  None    FINAL IMPRESSION      1. COPD exacerbation ([facility]          DISPOSITION / PLAN     DISPOSITION Decision To Discharge 01/09/2020 11:51:25 AM      PATIENT REFERRED TO:  Your Primary Care Provider    Schedule an appointment as soon as possible for a visit       [facility]

## 2020-01-10 LAB
EKG ATRIAL RATE: 103 BPM
EKG P AXIS: 77 DEGREES
EKG P-R INTERVAL: 140 MS
EKG Q-T INTERVAL: 320 MS
EKG QRS DURATION: 70 MS
EKG QTC CALCULATION (BAZETT): 419 MS
EKG R AXIS: 61 DEGREES
EKG T AXIS: 77 DEGREES
EKG VENTRICULAR RATE: 103 BPM

## 2020-01-26 ENCOUNTER — APPOINTMENT (OUTPATIENT)
Dept: CT IMAGING | Age: 65
DRG: 052 | End: 2020-01-26
Payer: MEDICARE

## 2020-01-26 ENCOUNTER — HOSPITAL ENCOUNTER (INPATIENT)
Age: 65
LOS: 1 days | Discharge: LEFT BEFORE DISCHARGE | DRG: 052 | End: 2020-01-27
Attending: EMERGENCY MEDICINE | Admitting: STUDENT IN AN ORGANIZED HEALTH CARE EDUCATION/TRAINING PROGRAM
Payer: MEDICARE

## 2020-01-26 ENCOUNTER — APPOINTMENT (OUTPATIENT)
Dept: GENERAL RADIOLOGY | Age: 65
DRG: 052 | End: 2020-01-26
Payer: MEDICARE

## 2020-01-26 PROBLEM — R53.1 RIGHT SIDED WEAKNESS: Status: ACTIVE | Noted: 2020-01-26

## 2020-01-26 LAB
-: NORMAL
ABSOLUTE EOS #: 0.09 K/UL (ref 0–0.44)
ABSOLUTE IMMATURE GRANULOCYTE: 0.09 K/UL (ref 0–0.3)
ABSOLUTE LYMPH #: 1.64 K/UL (ref 1.1–3.7)
ABSOLUTE MONO #: 0.82 K/UL (ref 0.1–1.2)
ALBUMIN SERPL-MCNC: 3.8 G/DL (ref 3.5–5.2)
ALBUMIN/GLOBULIN RATIO: 1 (ref 1–2.5)
ALP BLD-CCNC: 109 U/L (ref 35–104)
ALT SERPL-CCNC: 20 U/L (ref 5–33)
ANION GAP SERPL CALCULATED.3IONS-SCNC: 13 MMOL/L (ref 9–17)
AST SERPL-CCNC: 33 U/L
BASOPHILS # BLD: 0 % (ref 0–2)
BASOPHILS ABSOLUTE: <0.03 K/UL (ref 0–0.2)
BILIRUB SERPL-MCNC: 0.55 MG/DL (ref 0.3–1.2)
BUN BLDV-MCNC: 14 MG/DL (ref 8–23)
BUN/CREAT BLD: ABNORMAL (ref 9–20)
CALCIUM SERPL-MCNC: 9.1 MG/DL (ref 8.6–10.4)
CHLORIDE BLD-SCNC: 104 MMOL/L (ref 98–107)
CO2: 21 MMOL/L (ref 20–31)
CREAT SERPL-MCNC: 0.56 MG/DL (ref 0.5–0.9)
DIFFERENTIAL TYPE: ABNORMAL
EOSINOPHILS RELATIVE PERCENT: 1 % (ref 1–4)
ETHANOL PERCENT: <0.01 %
ETHANOL: <10 MG/DL
GFR AFRICAN AMERICAN: >60 ML/MIN
GFR NON-AFRICAN AMERICAN: >60 ML/MIN
GFR SERPL CREATININE-BSD FRML MDRD: ABNORMAL ML/MIN/{1.73_M2}
GFR SERPL CREATININE-BSD FRML MDRD: ABNORMAL ML/MIN/{1.73_M2}
GLUCOSE BLD-MCNC: 79 MG/DL (ref 70–99)
HCT VFR BLD CALC: 43.8 % (ref 36.3–47.1)
HEMOGLOBIN: 12.8 G/DL (ref 11.9–15.1)
IMMATURE GRANULOCYTES: 1 %
LYMPHOCYTES # BLD: 25 % (ref 24–43)
MCH RBC QN AUTO: 26.8 PG (ref 25.2–33.5)
MCHC RBC AUTO-ENTMCNC: 29.2 G/DL (ref 28.4–34.8)
MCV RBC AUTO: 91.6 FL (ref 82.6–102.9)
MONOCYTES # BLD: 13 % (ref 3–12)
NRBC AUTOMATED: 0 PER 100 WBC
PDW BLD-RTO: 19.2 % (ref 11.8–14.4)
PLATELET # BLD: 571 K/UL (ref 138–453)
PLATELET ESTIMATE: ABNORMAL
PMV BLD AUTO: 8.8 FL (ref 8.1–13.5)
POTASSIUM SERPL-SCNC: 4.1 MMOL/L (ref 3.7–5.3)
RBC # BLD: 4.78 M/UL (ref 3.95–5.11)
RBC # BLD: ABNORMAL 10*6/UL
REASON FOR REJECTION: NORMAL
SEG NEUTROPHILS: 60 % (ref 36–65)
SEGMENTED NEUTROPHILS ABSOLUTE COUNT: 3.83 K/UL (ref 1.5–8.1)
SODIUM BLD-SCNC: 138 MMOL/L (ref 135–144)
TOTAL PROTEIN: 7.7 G/DL (ref 6.4–8.3)
TROPONIN INTERP: NORMAL
TROPONIN T: NORMAL NG/ML
TROPONIN, HIGH SENSITIVITY: 13 NG/L (ref 0–14)
WBC # BLD: 6.5 K/UL (ref 3.5–11.3)
WBC # BLD: ABNORMAL 10*3/UL
ZZ NTE CLEAN UP: ORDERED TEST: NORMAL
ZZ NTE WITH NAME CLEAN UP: SPECIMEN SOURCE: NORMAL

## 2020-01-26 PROCEDURE — 70450 CT HEAD/BRAIN W/O DYE: CPT

## 2020-01-26 PROCEDURE — G0480 DRUG TEST DEF 1-7 CLASSES: HCPCS

## 2020-01-26 PROCEDURE — 99223 1ST HOSP IP/OBS HIGH 75: CPT | Performed by: PSYCHIATRY & NEUROLOGY

## 2020-01-26 PROCEDURE — 84484 ASSAY OF TROPONIN QUANT: CPT

## 2020-01-26 PROCEDURE — 93005 ELECTROCARDIOGRAM TRACING: CPT | Performed by: STUDENT IN AN ORGANIZED HEALTH CARE EDUCATION/TRAINING PROGRAM

## 2020-01-26 PROCEDURE — 99285 EMERGENCY DEPT VISIT HI MDM: CPT

## 2020-01-26 PROCEDURE — 85025 COMPLETE CBC W/AUTO DIFF WBC: CPT

## 2020-01-26 PROCEDURE — 2580000003 HC RX 258: Performed by: STUDENT IN AN ORGANIZED HEALTH CARE EDUCATION/TRAINING PROGRAM

## 2020-01-26 PROCEDURE — 80053 COMPREHEN METABOLIC PANEL: CPT

## 2020-01-26 PROCEDURE — 1200000000 HC SEMI PRIVATE

## 2020-01-26 PROCEDURE — 6370000000 HC RX 637 (ALT 250 FOR IP): Performed by: STUDENT IN AN ORGANIZED HEALTH CARE EDUCATION/TRAINING PROGRAM

## 2020-01-26 PROCEDURE — 71046 X-RAY EXAM CHEST 2 VIEWS: CPT

## 2020-01-26 RX ORDER — 0.9 % SODIUM CHLORIDE 0.9 %
500 INTRAVENOUS SOLUTION INTRAVENOUS ONCE
Status: COMPLETED | OUTPATIENT
Start: 2020-01-26 | End: 2020-01-26

## 2020-01-26 RX ORDER — ACETAMINOPHEN 325 MG/1
650 TABLET ORAL EVERY 4 HOURS PRN
Status: DISCONTINUED | OUTPATIENT
Start: 2020-01-26 | End: 2020-01-27

## 2020-01-26 RX ORDER — IPRATROPIUM BROMIDE AND ALBUTEROL SULFATE 2.5; .5 MG/3ML; MG/3ML
1 SOLUTION RESPIRATORY (INHALATION) EVERY 4 HOURS PRN
Status: DISCONTINUED | OUTPATIENT
Start: 2020-01-26 | End: 2020-01-27

## 2020-01-26 RX ORDER — GABAPENTIN 300 MG/1
300 CAPSULE ORAL 3 TIMES DAILY
Status: DISCONTINUED | OUTPATIENT
Start: 2020-01-26 | End: 2020-01-27 | Stop reason: HOSPADM

## 2020-01-26 RX ORDER — ATORVASTATIN CALCIUM 40 MG/1
40 TABLET, FILM COATED ORAL NIGHTLY
Status: DISCONTINUED | OUTPATIENT
Start: 2020-01-26 | End: 2020-01-27 | Stop reason: HOSPADM

## 2020-01-26 RX ORDER — CARVEDILOL 6.25 MG/1
6.25 TABLET ORAL 2 TIMES DAILY WITH MEALS
Status: DISCONTINUED | OUTPATIENT
Start: 2020-01-27 | End: 2020-01-27 | Stop reason: HOSPADM

## 2020-01-26 RX ORDER — SODIUM CHLORIDE 0.9 % (FLUSH) 0.9 %
10 SYRINGE (ML) INJECTION EVERY 12 HOURS SCHEDULED
Status: DISCONTINUED | OUTPATIENT
Start: 2020-01-26 | End: 2020-01-27 | Stop reason: HOSPADM

## 2020-01-26 RX ORDER — SODIUM CHLORIDE 0.9 % (FLUSH) 0.9 %
10 SYRINGE (ML) INJECTION PRN
Status: DISCONTINUED | OUTPATIENT
Start: 2020-01-26 | End: 2020-01-27 | Stop reason: HOSPADM

## 2020-01-26 RX ORDER — THIAMINE MONONITRATE (VIT B1) 100 MG
100 TABLET ORAL DAILY
Status: DISCONTINUED | OUTPATIENT
Start: 2020-01-27 | End: 2020-01-27 | Stop reason: HOSPADM

## 2020-01-26 RX ORDER — SODIUM CHLORIDE 0.9 % (FLUSH) 0.9 %
10 SYRINGE (ML) INJECTION PRN
Status: DISCONTINUED | OUTPATIENT
Start: 2020-01-26 | End: 2020-01-27

## 2020-01-26 RX ORDER — CLOPIDOGREL BISULFATE 75 MG/1
75 TABLET ORAL DAILY
Status: DISCONTINUED | OUTPATIENT
Start: 2020-01-27 | End: 2020-01-27 | Stop reason: HOSPADM

## 2020-01-26 RX ORDER — ONDANSETRON 2 MG/ML
4 INJECTION INTRAMUSCULAR; INTRAVENOUS EVERY 6 HOURS PRN
Status: DISCONTINUED | OUTPATIENT
Start: 2020-01-26 | End: 2020-01-27

## 2020-01-26 RX ORDER — ALBUTEROL SULFATE 2.5 MG/3ML
2.5 SOLUTION RESPIRATORY (INHALATION)
Status: DISCONTINUED | OUTPATIENT
Start: 2020-01-26 | End: 2020-01-27

## 2020-01-26 RX ORDER — FOLIC ACID 1 MG/1
1 TABLET ORAL DAILY
Status: DISCONTINUED | OUTPATIENT
Start: 2020-01-27 | End: 2020-01-27 | Stop reason: HOSPADM

## 2020-01-26 RX ORDER — SODIUM CHLORIDE 9 MG/ML
INJECTION, SOLUTION INTRAVENOUS CONTINUOUS
Status: DISCONTINUED | OUTPATIENT
Start: 2020-01-26 | End: 2020-01-27

## 2020-01-26 RX ORDER — M-VIT,TX,IRON,MINS/CALC/FOLIC 27MG-0.4MG
1 TABLET ORAL DAILY
Status: DISCONTINUED | OUTPATIENT
Start: 2020-01-27 | End: 2020-01-27 | Stop reason: HOSPADM

## 2020-01-26 RX ORDER — CHOLECALCIFEROL (VITAMIN D3) 125 MCG
250 CAPSULE ORAL DAILY
Status: DISCONTINUED | OUTPATIENT
Start: 2020-01-27 | End: 2020-01-27 | Stop reason: HOSPADM

## 2020-01-26 RX ORDER — LANOLIN ALCOHOL/MO/W.PET/CERES
325 CREAM (GRAM) TOPICAL DAILY
Status: DISCONTINUED | OUTPATIENT
Start: 2020-01-27 | End: 2020-01-27 | Stop reason: HOSPADM

## 2020-01-26 RX ADMIN — SODIUM CHLORIDE 500 ML: 9 INJECTION, SOLUTION INTRAVENOUS at 21:36

## 2020-01-26 RX ADMIN — GABAPENTIN 300 MG: 300 CAPSULE ORAL at 23:56

## 2020-01-26 RX ADMIN — DESMOPRESSIN ACETATE 40 MG: 0.2 TABLET ORAL at 23:56

## 2020-01-26 ASSESSMENT — ENCOUNTER SYMPTOMS
VOMITING: 0
RHINORRHEA: 0
NAUSEA: 0
BACK PAIN: 0
ABDOMINAL PAIN: 0
WHEEZING: 0
SORE THROAT: 0
COUGH: 1
SHORTNESS OF BREATH: 1

## 2020-01-26 ASSESSMENT — PAIN SCALES - GENERAL: PAINLEVEL_OUTOF10: 0

## 2020-01-27 ENCOUNTER — APPOINTMENT (OUTPATIENT)
Dept: MRI IMAGING | Age: 65
DRG: 052 | End: 2020-01-27
Payer: MEDICARE

## 2020-01-27 VITALS
WEIGHT: 90 LBS | DIASTOLIC BLOOD PRESSURE: 77 MMHG | TEMPERATURE: 99.8 F | HEART RATE: 100 BPM | BODY MASS INDEX: 18.14 KG/M2 | SYSTOLIC BLOOD PRESSURE: 156 MMHG | RESPIRATION RATE: 21 BRPM | OXYGEN SATURATION: 100 % | HEIGHT: 59 IN

## 2020-01-27 PROBLEM — R53.1 GENERALIZED WEAKNESS: Status: ACTIVE | Noted: 2020-01-27

## 2020-01-27 PROBLEM — R05.9 COUGH: Status: ACTIVE | Noted: 2020-01-27

## 2020-01-27 PROBLEM — R51.9 CHRONIC DAILY HEADACHE: Status: ACTIVE | Noted: 2020-01-27

## 2020-01-27 PROBLEM — R06.09 DYSPNEA ON EXERTION: Status: ACTIVE | Noted: 2020-01-27

## 2020-01-27 PROBLEM — R41.3 MEMORY CHANGES: Status: ACTIVE | Noted: 2020-01-27

## 2020-01-27 LAB
-: NORMAL
ABSOLUTE EOS #: 0.09 K/UL (ref 0–0.44)
ABSOLUTE IMMATURE GRANULOCYTE: 0.08 K/UL (ref 0–0.3)
ABSOLUTE LYMPH #: 1.53 K/UL (ref 1.1–3.7)
ABSOLUTE MONO #: 1.03 K/UL (ref 0.1–1.2)
AMORPHOUS: NORMAL
AMPHETAMINE SCREEN URINE: NEGATIVE
ANION GAP SERPL CALCULATED.3IONS-SCNC: 18 MMOL/L (ref 9–17)
BACTERIA: NORMAL
BARBITURATE SCREEN URINE: POSITIVE
BASOPHILS # BLD: 0 % (ref 0–2)
BASOPHILS ABSOLUTE: <0.03 K/UL (ref 0–0.2)
BENZODIAZEPINE SCREEN, URINE: NEGATIVE
BILIRUBIN URINE: NEGATIVE
BUN BLDV-MCNC: 16 MG/DL (ref 8–23)
BUN/CREAT BLD: ABNORMAL (ref 9–20)
BUPRENORPHINE URINE: ABNORMAL
CALCIUM SERPL-MCNC: 8.7 MG/DL (ref 8.6–10.4)
CANNABINOID SCREEN URINE: NEGATIVE
CASTS UA: NORMAL /LPF (ref 0–8)
CHLORIDE BLD-SCNC: 107 MMOL/L (ref 98–107)
CHOLESTEROL/HDL RATIO: 2.4
CHOLESTEROL: 182 MG/DL
CO2: 17 MMOL/L (ref 20–31)
COCAINE METABOLITE, URINE: NEGATIVE
COLOR: ABNORMAL
COMMENT UA: ABNORMAL
CREAT SERPL-MCNC: 0.55 MG/DL (ref 0.5–0.9)
CRYSTALS, UA: NORMAL /HPF
DIFFERENTIAL TYPE: ABNORMAL
EKG ATRIAL RATE: 88 BPM
EKG ATRIAL RATE: 92 BPM
EKG P AXIS: 71 DEGREES
EKG P AXIS: 79 DEGREES
EKG P-R INTERVAL: 138 MS
EKG P-R INTERVAL: 146 MS
EKG Q-T INTERVAL: 320 MS
EKG Q-T INTERVAL: 362 MS
EKG QRS DURATION: 58 MS
EKG QRS DURATION: 66 MS
EKG QTC CALCULATION (BAZETT): 395 MS
EKG QTC CALCULATION (BAZETT): 438 MS
EKG R AXIS: 44 DEGREES
EKG R AXIS: 65 DEGREES
EKG T AXIS: -96 DEGREES
EKG T AXIS: 39 DEGREES
EKG VENTRICULAR RATE: 88 BPM
EKG VENTRICULAR RATE: 92 BPM
EOSINOPHILS RELATIVE PERCENT: 2 % (ref 1–4)
EPITHELIAL CELLS UA: NORMAL /HPF (ref 0–5)
ESTIMATED AVERAGE GLUCOSE: 105 MG/DL
GFR AFRICAN AMERICAN: >60 ML/MIN
GFR NON-AFRICAN AMERICAN: >60 ML/MIN
GFR SERPL CREATININE-BSD FRML MDRD: ABNORMAL ML/MIN/{1.73_M2}
GFR SERPL CREATININE-BSD FRML MDRD: ABNORMAL ML/MIN/{1.73_M2}
GLUCOSE BLD-MCNC: 86 MG/DL (ref 70–99)
GLUCOSE URINE: NEGATIVE
HBA1C MFR BLD: 5.3 % (ref 4–6)
HCT VFR BLD CALC: 41.7 % (ref 36.3–47.1)
HDLC SERPL-MCNC: 77 MG/DL
HEMOGLOBIN: 12 G/DL (ref 11.9–15.1)
IMMATURE GRANULOCYTES: 1 %
IRON SATURATION: 9 % (ref 20–55)
IRON: 40 UG/DL (ref 37–145)
KETONES, URINE: NEGATIVE
LDL CHOLESTEROL: 81 MG/DL (ref 0–130)
LEUKOCYTE ESTERASE, URINE: ABNORMAL
LYMPHOCYTES # BLD: 25 % (ref 24–43)
MCH RBC QN AUTO: 26.4 PG (ref 25.2–33.5)
MCHC RBC AUTO-ENTMCNC: 28.8 G/DL (ref 28.4–34.8)
MCV RBC AUTO: 91.9 FL (ref 82.6–102.9)
MDMA URINE: ABNORMAL
METHADONE SCREEN, URINE: NEGATIVE
METHAMPHETAMINE, URINE: ABNORMAL
MONOCYTES # BLD: 17 % (ref 3–12)
MUCUS: NORMAL
NITRITE, URINE: NEGATIVE
NRBC AUTOMATED: 0 PER 100 WBC
OPIATES, URINE: NEGATIVE
OTHER OBSERVATIONS UA: NORMAL
OXYCODONE SCREEN URINE: NEGATIVE
PDW BLD-RTO: 19.4 % (ref 11.8–14.4)
PH UA: 6.5 (ref 5–8)
PHENCYCLIDINE, URINE: NEGATIVE
PLATELET # BLD: 574 K/UL (ref 138–453)
PLATELET ESTIMATE: ABNORMAL
PMV BLD AUTO: 9 FL (ref 8.1–13.5)
POTASSIUM SERPL-SCNC: 4.2 MMOL/L (ref 3.7–5.3)
PROPOXYPHENE, URINE: ABNORMAL
PROTEIN UA: ABNORMAL
RBC # BLD: 4.54 M/UL (ref 3.95–5.11)
RBC # BLD: ABNORMAL 10*6/UL
RBC UA: NORMAL /HPF (ref 0–4)
RENAL EPITHELIAL, UA: NORMAL /HPF
SEG NEUTROPHILS: 55 % (ref 36–65)
SEGMENTED NEUTROPHILS ABSOLUTE COUNT: 3.29 K/UL (ref 1.5–8.1)
SODIUM BLD-SCNC: 142 MMOL/L (ref 135–144)
SPECIFIC GRAVITY UA: 1.02 (ref 1–1.03)
TEST INFORMATION: ABNORMAL
TOTAL IRON BINDING CAPACITY: 456 UG/DL (ref 250–450)
TRICHOMONAS: NORMAL
TRICYCLIC ANTIDEPRESSANTS, UR: ABNORMAL
TRIGL SERPL-MCNC: 121 MG/DL
TROPONIN INTERP: NORMAL
TROPONIN T: NORMAL NG/ML
TROPONIN, HIGH SENSITIVITY: 13 NG/L (ref 0–14)
TSH SERPL DL<=0.05 MIU/L-ACNC: 1.17 MIU/L (ref 0.3–5)
TURBIDITY: CLEAR
UNSATURATED IRON BINDING CAPACITY: 416 UG/DL (ref 112–347)
URINE HGB: NEGATIVE
UROBILINOGEN, URINE: NORMAL
VITAMIN D 25-HYDROXY: 8.9 NG/ML (ref 30–100)
VLDLC SERPL CALC-MCNC: NORMAL MG/DL (ref 1–30)
WBC # BLD: 6 K/UL (ref 3.5–11.3)
WBC # BLD: ABNORMAL 10*3/UL
WBC UA: NORMAL /HPF (ref 0–5)
YEAST: NORMAL

## 2020-01-27 PROCEDURE — 82306 VITAMIN D 25 HYDROXY: CPT

## 2020-01-27 PROCEDURE — 83550 IRON BINDING TEST: CPT

## 2020-01-27 PROCEDURE — 92523 SPEECH SOUND LANG COMPREHEN: CPT

## 2020-01-27 PROCEDURE — 6370000000 HC RX 637 (ALT 250 FOR IP): Performed by: STUDENT IN AN ORGANIZED HEALTH CARE EDUCATION/TRAINING PROGRAM

## 2020-01-27 PROCEDURE — 93010 ELECTROCARDIOGRAM REPORT: CPT | Performed by: INTERNAL MEDICINE

## 2020-01-27 PROCEDURE — 70551 MRI BRAIN STEM W/O DYE: CPT

## 2020-01-27 PROCEDURE — 83540 ASSAY OF IRON: CPT

## 2020-01-27 PROCEDURE — 94761 N-INVAS EAR/PLS OXIMETRY MLT: CPT

## 2020-01-27 PROCEDURE — 84484 ASSAY OF TROPONIN QUANT: CPT

## 2020-01-27 PROCEDURE — 85025 COMPLETE CBC W/AUTO DIFF WBC: CPT

## 2020-01-27 PROCEDURE — 97535 SELF CARE MNGMENT TRAINING: CPT

## 2020-01-27 PROCEDURE — 94640 AIRWAY INHALATION TREATMENT: CPT

## 2020-01-27 PROCEDURE — 81001 URINALYSIS AUTO W/SCOPE: CPT

## 2020-01-27 PROCEDURE — 97530 THERAPEUTIC ACTIVITIES: CPT

## 2020-01-27 PROCEDURE — 76937 US GUIDE VASCULAR ACCESS: CPT

## 2020-01-27 PROCEDURE — 2580000003 HC RX 258: Performed by: STUDENT IN AN ORGANIZED HEALTH CARE EDUCATION/TRAINING PROGRAM

## 2020-01-27 PROCEDURE — 97166 OT EVAL MOD COMPLEX 45 MIN: CPT

## 2020-01-27 PROCEDURE — 99223 1ST HOSP IP/OBS HIGH 75: CPT | Performed by: PSYCHIATRY & NEUROLOGY

## 2020-01-27 PROCEDURE — 80048 BASIC METABOLIC PNL TOTAL CA: CPT

## 2020-01-27 PROCEDURE — 93005 ELECTROCARDIOGRAM TRACING: CPT | Performed by: STUDENT IN AN ORGANIZED HEALTH CARE EDUCATION/TRAINING PROGRAM

## 2020-01-27 PROCEDURE — 87086 URINE CULTURE/COLONY COUNT: CPT

## 2020-01-27 PROCEDURE — 80061 LIPID PANEL: CPT

## 2020-01-27 PROCEDURE — 36415 COLL VENOUS BLD VENIPUNCTURE: CPT

## 2020-01-27 PROCEDURE — 84443 ASSAY THYROID STIM HORMONE: CPT

## 2020-01-27 PROCEDURE — 80307 DRUG TEST PRSMV CHEM ANLYZR: CPT

## 2020-01-27 PROCEDURE — 83036 HEMOGLOBIN GLYCOSYLATED A1C: CPT

## 2020-01-27 PROCEDURE — 97162 PT EVAL MOD COMPLEX 30 MIN: CPT

## 2020-01-27 RX ORDER — POTASSIUM CHLORIDE 7.45 MG/ML
10 INJECTION INTRAVENOUS PRN
Status: DISCONTINUED | OUTPATIENT
Start: 2020-01-27 | End: 2020-01-27

## 2020-01-27 RX ORDER — ALBUTEROL SULFATE 2.5 MG/3ML
2.5 SOLUTION RESPIRATORY (INHALATION) EVERY 6 HOURS PRN
Status: DISCONTINUED | OUTPATIENT
Start: 2020-01-27 | End: 2020-01-27

## 2020-01-27 RX ORDER — BUTALBITAL, ACETAMINOPHEN AND CAFFEINE 50; 325; 40 MG/1; MG/1; MG/1
1 TABLET ORAL EVERY 6 HOURS PRN
Status: DISCONTINUED | OUTPATIENT
Start: 2020-01-27 | End: 2020-01-27 | Stop reason: HOSPADM

## 2020-01-27 RX ORDER — PREDNISONE 20 MG/1
40 TABLET ORAL DAILY
Status: DISCONTINUED | OUTPATIENT
Start: 2020-01-27 | End: 2020-01-27 | Stop reason: HOSPADM

## 2020-01-27 RX ORDER — IPRATROPIUM BROMIDE AND ALBUTEROL SULFATE 2.5; .5 MG/3ML; MG/3ML
1 SOLUTION RESPIRATORY (INHALATION) 3 TIMES DAILY
Status: DISCONTINUED | OUTPATIENT
Start: 2020-01-27 | End: 2020-01-27

## 2020-01-27 RX ORDER — MAGNESIUM SULFATE 1 G/100ML
1 INJECTION INTRAVENOUS PRN
Status: DISCONTINUED | OUTPATIENT
Start: 2020-01-27 | End: 2020-01-27 | Stop reason: HOSPADM

## 2020-01-27 RX ORDER — ALBUTEROL SULFATE 2.5 MG/3ML
2.5 SOLUTION RESPIRATORY (INHALATION)
Status: DISCONTINUED | OUTPATIENT
Start: 2020-01-27 | End: 2020-01-27

## 2020-01-27 RX ORDER — BUTALBITAL, ACETAMINOPHEN AND CAFFEINE 50; 325; 40 MG/1; MG/1; MG/1
1 TABLET ORAL EVERY 4 HOURS PRN
Status: DISCONTINUED | OUTPATIENT
Start: 2020-01-27 | End: 2020-01-27

## 2020-01-27 RX ORDER — ERGOCALCIFEROL 1.25 MG/1
50000 CAPSULE ORAL WEEKLY
Status: DISCONTINUED | OUTPATIENT
Start: 2020-01-27 | End: 2020-01-27 | Stop reason: HOSPADM

## 2020-01-27 RX ORDER — IPRATROPIUM BROMIDE AND ALBUTEROL SULFATE 2.5; .5 MG/3ML; MG/3ML
1 SOLUTION RESPIRATORY (INHALATION) EVERY 4 HOURS PRN
Status: DISCONTINUED | OUTPATIENT
Start: 2020-01-27 | End: 2020-01-27 | Stop reason: HOSPADM

## 2020-01-27 RX ORDER — ALBUTEROL SULFATE 2.5 MG/3ML
2.5 SOLUTION RESPIRATORY (INHALATION) 3 TIMES DAILY
Status: DISCONTINUED | OUTPATIENT
Start: 2020-01-27 | End: 2020-01-27 | Stop reason: HOSPADM

## 2020-01-27 RX ORDER — LEVOFLOXACIN 500 MG/1
500 TABLET, FILM COATED ORAL DAILY
Status: DISCONTINUED | OUTPATIENT
Start: 2020-01-27 | End: 2020-01-27 | Stop reason: HOSPADM

## 2020-01-27 RX ORDER — POTASSIUM CHLORIDE 20 MEQ/1
40 TABLET, EXTENDED RELEASE ORAL PRN
Status: DISCONTINUED | OUTPATIENT
Start: 2020-01-27 | End: 2020-01-27

## 2020-01-27 RX ADMIN — BUTALBITAL, ACETAMINOPHEN AND CAFFEINE 1 TABLET: 50; 325; 40 TABLET ORAL at 12:56

## 2020-01-27 RX ADMIN — IPRATROPIUM BROMIDE AND ALBUTEROL SULFATE 1 AMPULE: .5; 3 SOLUTION RESPIRATORY (INHALATION) at 14:04

## 2020-01-27 RX ADMIN — MOMETASONE FUROATE AND FORMOTEROL FUMARATE DIHYDRATE 2 PUFF: 100; 5 AEROSOL RESPIRATORY (INHALATION) at 09:54

## 2020-01-27 RX ADMIN — SODIUM CHLORIDE: 9 INJECTION, SOLUTION INTRAVENOUS at 09:06

## 2020-01-27 ASSESSMENT — PAIN SCALES - GENERAL
PAINLEVEL_OUTOF10: 8
PAINLEVEL_OUTOF10: 8
PAINLEVEL_OUTOF10: 0
PAINLEVEL_OUTOF10: 8

## 2020-01-27 ASSESSMENT — PAIN DESCRIPTION - LOCATION
LOCATION: HEAD
LOCATION: HEAD

## 2020-01-27 NOTE — PROGRESS NOTES
symmetric  XII - tongue midline   Motor function  Strength: grossly symmetric except weak left wrist           Effort dependant weakness b/l legs  No drift  Bulk: grossly normal no atrophy  Tone: symmetric b/l arms and legs  Abnormal movements: slight tremor    Sensory function Symmetric to touch and temperature in all extremities bilaterally   Cerebellar No dysmetria   Reflex function DTR:        2+ b/l symmetric in biceps, brachioradialis, patellar, calcaneal  Babinski b/l plantar downgoing   Gait                  Not assessed     DATA      Lab Results   Component Value Date    WBC 6.0 01/27/2020    HGB 12.0 01/27/2020    HCT 41.7 01/27/2020     (H) 01/27/2020    CHOL 182 01/27/2020    TRIG 121 01/27/2020    HDL 77 01/27/2020    ALT 20 01/26/2020    AST 33 (H) 01/26/2020     01/27/2020    K 4.2 01/27/2020     01/27/2020    CREATININE 0.55 01/27/2020    BUN 16 01/27/2020    CO2 17 (L) 01/27/2020    TSH 0.65 01/09/2020    INR 0.9 11/13/2019    LABA1C 5.3 11/13/2019         ASSESSMENT:  64 y.o. female h/o HTN, HLD, tobacco use, prior IVDA/cocaine use, Hep C, alcoholic cirrhosis. 4-week history of worsening memory, shaking hands, aphasia and right-sided drift, productive coughfound with     Acute on chronic encephalopathy, suspect metabolic 2/2 liver disease, possibly iatrogenic, possibly cognitive decline.      Memory concern per patient  Chronic worsening of TSAI and cough 1 month   Headache 1 month, likely assoc with respiratory illness, possibly migraine      PLAN:  MRI B w/o  plavix 75 mg daily, high intensity statin   U1X, LDL  Metabolic w/o thiamine, L89/YXSSVV, vdrl, isreal, tsh, esr, lipid panel, neurocog outpatient testing, RF repeat  Treat HA with fiorocet prn   PT, OT  dvt ppx lov, no need GI ppx, no need SBP ppx  Pending discharge recs  IM c/s for respiratory issues and LAD on CTA      Electronically signed by Kami Reyes DO on 1/27/2020 at 8:35 AM

## 2020-01-27 NOTE — PROGRESS NOTES
Physical Therapy    Facility/Department: [facility] NEURO  Initial Assessment    NAME: Emi Daniels  : 1955  MRN: [id number]    Date of Service: 2020    Discharge Recommendations:  Patient would benefit from continued therapy after discharge   Assessment   Body structures, Functions, Activity limitations: Decreased functional mobility ; Decreased endurance;Decreased strength  Assessment: Fatigued easily with ambulation but good effort given throughou the eval process. This pt could benefit from a continuation of PT following her DC. Prognosis: Good  Decision Making: Medium Complexity  PT Education: Goals;Plan of Care;PT Role  REQUIRES PT FOLLOW UP: Yes  Activity Tolerance  Activity Tolerance: Patient limited by fatigue   Patient Diagnosis(es): The primary encounter diagnosis was Unilateral weakness. A diagnosis of Cough was also pertinent to this visit. has a past medical history of Appendicitis, CHF (congestive heart failure) (HCC), Chronic respiratory failure with hypoxia and hypercapnia (HCC), Chronic rhinitis, Cigarette smoker, COPD (chronic obstructive pulmonary disease) ([address] Depression, Dysphagia, Essential hypertension, GERD (gastroesophageal reflux disease), Heart failure, diastolic, with acute decompensation ([address] Hepatitis C, chronic ([address] History of smoking at least 1 pack per day for at least 30 years, Hyperlipidemia, Hypertension, Migraine, Moderate COPD (chronic obstructive pulmonary disease) ([address] Multiple transfusions, Osteoarthritis, Pneumonia, Scoliosis, and Substance abuse ([address] has a past surgical history that includes  section; LEEP (); Upper gastrointestinal endoscopy (); Gastric bypass surgery; Appendectomy (2017); laparoscopic appendectomy (N/A, 3/27/2017); Upper gastrointestinal endoscopy (2018);  Upper gastrointestinal endoscopy (N/A, 2018); pr colonoscopy w/biopsy single/multiple (N/A, 11/10/2018); pr egd transoral biopsy single/multiple (N/A, 11/9/2018); hip surgery (Right, 08/13/2019); and Total hip arthroplasty (Right, 8/13/2019). Restrictions  Position Activity Restriction  Other position/activity restrictions:  Up with assist  Vision/Hearing  Vision: Impaired  Vision Exceptions: Wears glasses at all times  Hearing: Within functional limits     Subjective  General  Patient assessed for rehabilitation services?: Yes  Response To Previous Treatment: Not applicable  Family / Caregiver Present: No  Follows Commands: Within Functional Limits  Pain Screening  Patient Currently in Pain: Yes  Pain Assessment  Pain Assessment: 0-10  Pain Level: 8  Pain Location: Head  Vital Signs  Patient Currently in Pain: Yes     Orientation  Orientation  Overall Orientation Status: Within Functional Limits  Social/Functional History  Social/Functional History  Lives With: Alone  Type of Home: Apartment(senior living apartment )  Home Layout: Multi-level(pt reported living on 3rd floor)  Home Access: Elevator, Level entry  Bathroom Shower/Tub: Tub/Shower unit  Bathroom Toilet: Standard  Bathroom Equipment: Shower chair(pt reported shower chair too big for bath)  Home Equipment: 4 wheeled walker(pt reported using 4WW majority of time )  Receives Help From: Family  ADL Assistance: Independent  Homemaking Assistance: Needs assistance  Homemaking Responsibilities: Yes(pt reported sister assists with IADLs)  Meal Prep Responsibility: Secondary  Laundry Responsibility: Secondary  Cleaning Responsibility: Secondary  Ambulation Assistance: Independent  Transfer Assistance: Independent  Active : No  Patient's  Info: medical cab or sister drives  Occupation: On disability  Additional Comments: pt repored sister supportive and assists pt occasionally   Cognition      Objective     AROM RLE (degrees)  RLE AROM: WNL  AROM LLE (degrees)  LLE AROM : WNL  AROM RUE (degrees)  RUE AROM : WNL  AROM LUE (degrees)  LUE AROM : WNL  Strength RLE  St[facility]  Strength LLE  Strength LLE: WFL  Strength RUE  Strength RUE: WFL  Strength LUE  Strength LUE: WFL     Sensation  Overall Sensation Status: Impaired(Reports N/T in B hands)  Bed mobility  Supine to Sit: Minimal assistance  Sit to Supine: Minimal assistance  Scooting: Minimal assistance  Transfers  Sit to Stand: Minimal Assistance  Stand to sit: Minimal Assistance  Ambulation  Ambulation?: Yes  Ambulation 1  Surface: level tile  Device: Rolling Walker  Assistance: Contact guard assistance  Distance: amb 30 ft with a RW x CGA     Balance  Posture: Good  Sitting - Static: Good  Sitting - Dynamic: Fair  Standing - Static: Fair  Standing - Dynamic: [facility]  Times per week: 5-6x wk  Current Treatment Recommendations: Strengthening, Endurance Training, Gait Training, Functional Mobility Training, Stair training, Safety Education & Training  Safety Devices  Type of devices: Left in bed, Nurse notified, Call light within reach, Bed alarm in place, Patient at risk for falls    G-Code    OutComes Score     AM-PAC Score  AM-PAC Inpatient Mobility Raw Score : 18 (01/27/20 1206)  AM-PAC Inpatient T-Scale Score : 43.63 (01/27/20 1206)  Mobility Inpatient CMS 0-100% Score: 46.58 (01/27/20 1206)  Mobility Inpatient CMS G-Code Modifier : CK (01/27/20 1206)       Goals  Short term goals  Time Frame for Short term goals: 10 visits  Short term goal 1: transfers with SBA  Short term goal 2: amb 125 ft with a RW x SBA  Short term goal 3: ascend/descend 4 steps x SBA  Short term goal 4: exercise program x SBA  Patient Goals   Patient goals : Return home       Therapy Time   Individual Concurrent Group Co-treatment   Time In 8912         Time Out 0820         Minutes 25             1 of [facility], PT

## 2020-01-27 NOTE — PROGRESS NOTES
She was a poor historian. Mckenzie Porter also complained of a productive cough during this time.  She denied any headaches, dizziness, numbness or weakness, loss of consciousness.     In April 2019, she had an episode of a facial with left-sided facial droop.  CTA head and neck, CT head negative for acute infarct.  She left AMA at that time without complete work-up to rule out TIA versus stroke versus seizure versus complicated migraine.  She was started on Plavix and Lipitor.     History of GI bleed in 2018.  Found to have linear ulcerations adjacent to the IC valve, diverticulosis, small internal hemorrhoids.  Hep C positive, alcoholic cirrhosis. Pain:  Pain Assessment  Pain Assessment: 0-10  Pain Level: 8  Pain Location: Head    Assessment:            Pt presents with moderate to severe cognitive deficits characterized by impaired word associations, word generation, and immediate recall. Pt presents with mild receptive language deficits characterized by difficulty completing moderate to complex yes/no questions. Pt presents with mild expressive language deficits characterized by mild difficulty with sentence completion and answering wh- questions. Patient reports a concern with her memory and forgetting what to say. Pt. Presents no dysarthria, no O/M deficits at this time. ST to follow up and provide treatment to address noted deficits. Education provided. Recommendations:  Requires SLP Intervention: Yes  Duration/Frequency of Treatment: 3-5X Week  D/C Recommendations: Further therapy recommended at discharge. Plan:   Goals:  Short-term Goals  Goal 1: Patient will answer moderate to complex yes/no questions with 90% accuracy. Goal 2: Patient will complete sentence completion tasks with 90% accuracy. Goal 3: Patient will answer wh- questions with 90% accuracy. Goal 4: Patient will recall 3-5 units without distractions with 90% accuracy.   Goal 5: Patient will complete thought organizational

## 2020-01-27 NOTE — ED NOTES
Pt to room 2 by EMS with c/o cough. Per EMS, bystanders called EMS because pt was being disruptive. When EMS arrived, pt was slightly confused. EMS states that pt was c/o a cough, which she has had for 4 weeks and that she has been urinating more than usual. Upon arrival, pt disoriented to time. Pt reports cough and that she coughs a lot. No respiratory distress noted. Will continue to monitor.       Tonia Lemons RN  01/26/20 1926

## 2020-01-27 NOTE — PROGRESS NOTES
Pt not cooperating to do the NIH. Pt yelling and screaming at RN. Will chart to the best of my ability.

## 2020-01-27 NOTE — CONSULTS
(chronic obstructive pulmonary disease) ([facility] [address] [facility]     Multiple transfusions     Osteoarthritis     hands    Pneumonia     Scoliosis     Substance abuse ([facility] [address]     ivda and cocaine abuse in past       Past Surgical History:        Procedure Laterality Date    APPENDECTOMY  2017     SECTION      GASTRIC BYPASS SURGERY      HIP SURGERY Right 2019    HIP TOTAL ARTHROPLASTY ANTERIOR APPROACH - [facility],  C-ARM, (Right )    LAPAROSCOPIC APPENDECTOMY N/A 3/27/2017    APPENDECTOMY LAPAROSCOPIC performed by Zeferino Irwin MD at [facility]      hgsil    AL COLONOSCOPY W/BIOPSY SINGLE/MULTIPLE N/A 11/10/2018    COLONOSCOPY WITH BIOPSY performed by Nolen Hamman, MD at [facility]    AL EGD TRANSORAL BIOPSY SINGLE/MULTIPLE N/A 2018    EGD ESOPHAGOGASTRODUODENOSCOPY performed by Juani Pina MD at [address] Right 2019    HIP TOTAL ARTHROPLASTY ANTERIOR APPROACH - [facility],  C-ARM, performed by Will Clarke DO at [facility]      oesophagitis, candidiasis    UPPER GASTROINTESTINAL ENDOSCOPY  2018    UPPER GASTROINTESTINAL ENDOSCOPY N/A 2018    EGD CONTROL HEMORRHAGE performed by Juani Pina MD at [facility] History:   Social History     Socioeconomic History    Marital status: Single     Spouse name: Not on file    Number of children: Not on file    Years of education: Not on file    Highest education level: Not on file   Occupational History    Not on file   Social Needs    Financial resource strain: Not on file    Food insecurity:     Worry: Not on file     Inability: Not on file    Transportation needs:     Medical: Not on file     Non-medical: Not on file   Tobacco Use    Smoking status: Former Smoker     Packs/day: 1.00     Years: 40.00     Pack years: 40.00     Types: Cigarettes     Start date:      Last attempt to quit: 2018     Years since quittin.2  Smokeless tobacco: Never Used   Substance and Sexual Activity    Alcohol use: No     Alcohol/week: 0.0 standard drinks    Drug use: No     Types: IV, Cocaine     Comment: past history    Sexual activity: Not on file   Lifestyle    Physical activity:     Days per week: Not on file     Minutes per session: Not on file    Stress: Not on file   Relationships    Social connections:     Talks on phone: Not on file     Gets together: Not on file     Attends Confucianism service: Not on file     Active member of club or organization: Not on file     Attends meetings of clubs or organizations: Not on file     Relationship status: Not on file    Intimate partner violence:     Fear of current or ex partner: Not on file     Emotionally abused: Not on file     Physically abused: Not on file     Forced sexual activity: Not on file   Other Topics Concern    Not on file   Social History Narrative    Not on file       Family History:       Problem Relation Age of Onset    Breast Cancer Mother     Heart Disease Father        Allergies:  Aspirin and Roby Passey [cephalexin]    Home Medications:  Prior to Admission medications    Medication Sig Start Date End Date Taking? Authorizing Provider   albuterol sulfate HFA (PROVENTIL HFA) 108 (90 Base) MCG/ACT inhaler Inhale 1-2 puffs into the lungs once for 1 dose 1/9/20 1/9/20  Rj Gupta MD   ondansetron (ZOFRAN ODT) 4 MG disintegrating tablet Take 1 tablet by mouth every 8 hours as needed for Nausea 9/9/19   Constantino Correia PA-C   docusate sodium (COLACE, DULCOLAX) 100 MG CAPS Take 100 mg by mouth 2 times daily 8/22/19   Leslie Duke MD   [facility].  Devices [facility]) MISC 1 Device by Does not apply route once for 1 dose 8/5/19 8/5/19  Constantino Correia PA-C   hydrOXYzine (ATARAX) 25 MG tablet Take 1 tablet by mouth every 8 hours as needed for Itching 8/5/19   Constantino Correia PA-C   acetaminophen (TYLENOL) 325 MG tablet Take 1 tablet by mouth every 6 hours as needed for Pain 6/22/19   Tiago Barrett DO   benzonatate (TESSALON PERLES) 100 MG capsule Take 1 capsule by mouth 3 times daily as needed for Cough 3/11/19   Yandel Lopez DO   carvedilol (COREG) 6.25 MG tablet Take 1 tablet by mouth 2 times daily (with meals) 2/7/19   Elham Valverde MD   ferrous sulfate (MELISSA-HAO) 325 (65 Fe) MG tablet Take 1 tablet by mouth daily 2/7/19   Elham Valverde MD   melatonin 1 MG tablet Take 1 tablet by mouth nightly as needed for Sleep 1/11/19   Cinthia Barrett MD   ipratropium-albuterol (DUONEB) 0.5-2.5 (3) MG/3ML SOLN nebulizer solution Inhale 3 mLs into the lungs every 4 hours as needed for Shortness of Breath 1/11/19   Cinthia Barrett MD   mometasone-formoterol [facility]) 100-5 MCG/ACT inhaler Inhale 2 puffs into the lungs 2 times daily 12/5/18   Donta Lobato MD   pantoprazole (PROTONIX) 40 MG tablet Take 1 tablet by mouth 2 times daily (before meals) 11/13/18   Valentino Johnson MD   folic acid (FOLVITE) 1 MG tablet Take 1 tablet by mouth daily 10/13/18   Josefa Yeung MD   vitamin B-1 100 MG tablet Take 1 tablet by mouth daily 10/13/18   Josefa Yeung MD   vitamin B-12 250 MCG tablet Take 1 tablet by mouth daily 10/13/18   Josefa Yeung MD   gabapentin (NEURONTIN) 300 MG capsule Take 300 mg by mouth 3 times daily. Marcia Dye MD   Multiple Vitamins-Minerals (THERAPEUTIC MULTIVITAMIN-MINERALS) tablet Take 1 tablet by mouth daily 8/12/18   Quinton Bolivar MD       Current Medications:   No current facility-administered medications for this encounter.      REVIEW OF SYSTEMS:       CONSTITUTIONAL: negative for fatigue and malaise   EYES: negative for double vision and photophobia    HEENT: negative for tinnitus and sore throat   RESPIRATORY:  Positive for cough, shortness of breath   CARDIOVASCULAR: negative for chest pain, palpitations   GASTROINTESTINAL: negative for nausea, vomiting   GENITOURINARY: negative for incontinence   MUSCULOSKELETAL: negative for neck or back pain   NEUROLOGICAL: negative for seizures   PSYCHIATRIC: negative for fatigue     Review of systems otherwise negative. PHYSICAL EXAM:       BP (!) 160/97   Pulse 87   Temp 97.2 °F (36.2 °C) (Oral)   Resp 20   LMP 03/18/2015   SpO2 96%     CONSTITUTIONAL:  Well developed, well nourished, alert and oriented x 3, in no acute distress. GCS 15, nontoxic. No dysarthria, No aphasia. EOMI. HEAD:  normocephalic, atraumatic    EYES:  PERRLA, EOMI.   ENT:  moist mucous membranes   NECK:  supple, symmetric, no midline tenderness to palpation    BACK:  without midline tenderness, step-offs or deformities    LUNGS:  Equal air entry bilaterally   CARDIOVASCULAR:  normal s1 / s2   ABDOMEN:  Soft, no rigidity   NEUROLOGIC:  Mental Status:  A & O x3,awake             Cranial Nerves:    cranial nerves II-XII are grossly intact    Motor Exam:    Drift:  absent  Tone:  normal    Motor exam is 5 out of 5 all extremities with the exception of bilateral lower extremities    Sensory:    Touch:    Right Upper Extremity:  normal  Left Upper Extremity:  normal  Right Lower Extremity:  normal  Left Lower Extremity:  normal    Deep Tendon Reflexes:    Right Bicep:  2+  Left Bicep:  2+  Right Knee:  2+  Left Knee:  2+    Plantar Response:  Right:  downgoing  Left:  downgoing    Clonus:  absent  Fatima's:  N/A    Coordination/Dysmetria:  Heel to Shin:  Right:  abnormal -cannot perform  Left:  normal  Finger to Nose:   Right:  abnormal -difficulty hitting the target  Left:  abnormal -difficulty hitting the target  Dysdiadochokinesia:  N/A    Gait: Not performed    INITIAL NIH STROKE SCALE:    Time Performed:  8:15 PM     1a. Level of consciousness:  0 - alert; keenly responsive  1b. Level of consciousness questions:  0 - answers both questions correctly  1c. Level of consciousness questions:  0 - performs both tasks correctly  2. Best Gaze:  0 - normal  3. Visual:  0 - no visual loss  4.     Facial Palsy:  0 - normal symmetric movement  5a. Motor left arm:  0 - no drift, limb holds 90 (or 45) degrees for full 10 seconds  5b. Motor right arm:  0 - no drift, limb holds 90 (or 45) degrees for full 10 seconds  6a. Motor left le - no drift; leg holds 30 degree position for full 5 seconds  6b. Motor right le - no drift; leg holds 30 degree position for full 5 seconds  7. Limb Ataxia:  2 - present in two limbs  8. Sensory:  0 - normal; no sensory loss  9. Best Language:  1 - mild to moderate aphasia; some obvious loss of fluency or facility of comprehension without significant limitation on ideas expressed or form of expression. Reduction of speech and/or comprehension, however, makes conversation about provided materials difficult or impossible. For example, in conversation about provided materials, examiner can identify picture or naming card content from patient's response. 10.  Dysarthria:  0 - normal  11.   Extinction and Inattention:  0 - no abnormality    TOTAL:  3     SKIN:  no rash      LABS AND IMAGING:     CBC with Differential:    Lab Results   Component Value Date    WBC 5.1 2020    RBC 4.24 2020    RBC 4.15 2012    HGB 11.4 2020    HCT 38.7 2020     2020     2012    MCV 91.3 2020    MCH 26.9 2020    MCHC 29.5 2020    RDW 17.9 2020    NRBC 2 2019    LYMPHOPCT 42 2020    MONOPCT 16 2020    BASOPCT 0 2020    MONOSABS 0.79 2020    LYMPHSABS 2.14 2020    EOSABS 0.05 2020    BASOSABS <0.03 2020    DIFFTYPE NOT REPORTED 2020     BMP:    Lab Results   Component Value Date     2020    K 4.1 2020     2020    CO2 21 2020    BUN 14 2020    LABALBU 3.8 2020    LABALBU 4.4 2011    CREATININE 0.56 2020    CALCIUM 9.1 2020    GFRAA >60 2020    LABGLOM >60 2020    GLUCOSE 79 2020    GLUCOSE 76 2012 Elevated brain natriuretic peptide (BNP) level    Sinus tachycardia    Other dysphagia       75 y.o. female who presents with a 4-week history of worsening memory, hand tremors, drifting to the right side when walking. CT head showing mild atrophy, atherosclerotic calcifications, mild chronic small vessel ischemia. Given changes in her gait and positive finger-to-nose test, will get MRI to rule out acute intracranial process. Also suspect hint of dementia    - Discussed with Dr. Moore Dose   - MRI Brain WO   - ASA allergy. Will give Plavix 75mg daily. Follow-up H&H to monitor for bleeding given her history of GI bleeds. - Check thiamine level. - Folic acid, thiamine, multivitamin   - Zocor 80mg nightly    - Fasting Lipid panel, HbA1c   - PT, OT   - Telemetry    - Neuro checks per protocol  - We recommend SBP within normal limits    Additional recommendations may follow    Please contact [facility] with any changes in patients neurologic status. Thank you for your consult.           Anshu Savage MD  PGY-2, Internal medicine resident  [place], [place]  1/26/2020 8:42 PM

## 2020-01-27 NOTE — ED PROVIDER NOTES
[facility]     Emergency Department     Faculty Attestation    I performed a history and physical examination of the patient and discussed management with the resident. I reviewed the residents note and agree with the documented findings and plan of care. Any areas of disagreement are noted on the chart. I was personally present for the key portions of any procedures. I have documented in the chart those procedures where I was not present during the key portions. I have reviewed the emergency nurses triage note. I agree with the chief complaint, past medical history, past surgical history, allergies, medications, social and family history as documented unless otherwise noted below. For Physician Assistant/ Nurse Practitioner cases/documentation I have personally evaluated this patient and have completed at least one if not all key elements of the E/M (history, physical exam, and MDM). Additional findings are as noted. Patient here complaining of both cough as well as urinary frequency. She states both of these been ongoing for \"4 weeks. \"  She also states that she has a tremor that is new that is been going on for \"4 weeks. \"  No family here for additional information she does live at home alone. On exam patient does have a tremor right more than left. She is weak with drift in the right arm and right leg versus the left. Speech is mildly garbled but otherwise appropriate alert and oriented x4. Lungs clear and equal throughout no rhonchi. Heart regular. Abdomen soft nontender no pulsatile mass. Will call stroke consult given vague duration of symptoms, labs x-ray urine EKG, probable admission regardless    EKG interpretation: Sinus from 88. Normal intervals. Normal axis. There is T wave inversion in V5 V6, seen on prior EKG from 1/9/2020.   No acute findings    Critical Care     none    Humaira Bautista MD, Darryl Kingston  Attending Emergency  Physician             Martin Love

## 2020-01-27 NOTE — PLAN OF CARE
Problem: COMMUNICATION IMPAIRMENT  Goal: Ability to express needs and understand communication  Outcome: Ongoing     Problem: ACTIVITY INTOLERANCE/IMPAIRED MOBILITY  Goal: Mobility/activity is maintained at optimum level for patient  Outcome: Ongoing

## 2020-01-27 NOTE — PROGRESS NOTES
IV taken out, shantell TONY AMA paper work went over in full detail with pt. Pt states she fully understands the risk of leaving AMA. Pt getting dressed.

## 2020-01-27 NOTE — PROGRESS NOTES
Occupational Therapy   Occupational Therapy Initial Assessment  Date: 2020   Patient Name: Joann Owens  MRN: [id number]     : 1955    Date of Service: 2020    Discharge Recommendations:    Further therapy recommended at discharge. Assessment   Performance deficits / Impairments: Decreased functional mobility ; Decreased strength;Decreased endurance;Decreased ADL status; Decreased high-level IADLs  Treatment Diagnosis: weakness   Prognosis: Good  Decision Making: Medium Complexity  Patient Education: pt ed on POC, purpose of eval, importance of movement, benefits of therapy and safety during functional transfers/functional mobility. good return   REQUIRES OT FOLLOW UP: Yes  Activity Tolerance  Activity Tolerance: Patient Tolerated treatment well  Safety Devices  Safety Devices in place: Yes  Type of devices: Call light within reach; Left in bed;Bed alarm in place; Patient at risk for falls  Restraints  Initially in place: No         Patient Diagnosis(es): The primary encounter diagnosis was Unilateral weakness. A diagnosis of Cough was also pertinent to this visit. has a past medical history of Appendicitis, CHF (congestive heart failure) (HCC), Chronic respiratory failure with hypoxia and hypercapnia (HCC), Chronic rhinitis, Cigarette smoker, COPD (chronic obstructive pulmonary disease) ([address] Depression, Dysphagia, Essential hypertension, GERD (gastroesophageal reflux disease), Heart failure, diastolic, with acute decompensation ([address] Hepatitis C, chronic ([address] History of smoking at least 1 pack per day for at least 30 years, Hyperlipidemia, Hypertension, Migraine, Moderate COPD (chronic obstructive pulmonary disease) ([address] Multiple transfusions, Osteoarthritis, Pneumonia, Scoliosis, and Substance abuse ([address] has a past surgical history that includes  section; LEEP (); Upper gastrointestinal endoscopy (); Gastric bypass surgery;  Appendectomy (2017); laparoscopic appendectomy (N/A, 3/27/2017); Upper gastrointestinal endoscopy (11/05/2018); Upper gastrointestinal endoscopy (N/A, 11/5/2018); pr colonoscopy w/biopsy single/multiple (N/A, 11/10/2018); pr egd transoral biopsy single/multiple (N/A, 11/9/2018); hip surgery (Right, 08/13/2019); and Total hip arthroplasty (Right, 8/13/2019). Treatment Diagnosis: weakness       Restrictions  Restrictions/Precautions  Required Braces or Orthoses?: No  Position Activity Restriction  Other position/activity restrictions: Up with assist    Subjective   General  Patient assessed for rehabilitation services?: Yes  Family / Caregiver Present: No  Diagnosis: weakness   General Comment  Comments: RN ok'd for therapy this morning. Pt agreeable to participate in session and cooperative throughout   Patient Currently in Pain: Yes  Pain Assessment  Pain Assessment: 0-10  Pain Level: 8  Pain Location: Head  Non-Pharmaceutical Pain Intervention(s): Therapeutic presence;Distraction; Ambulation/Increased Activity  Response to Pain Intervention: Patient Satisfied    Oxygen Therapy  O2 Device: None (Room air)    Social/Functional History  Social/Functional History  Lives With: Alone  Type of Home: Apartment(senior living apartment )  Home Layout: Multi-level(pt reported living on 3rd floor)  Home Access: Elevator, Level entry  Bathroom Shower/Tub: Tub/Shower unit  Bathroom Toilet: Standard  Bathroom Equipment: Shower chair(pt reported shower chair too big for bath)  Home Equipment: 4 wheeled walker(pt reported using 4WW majority of time )  Receives Help From: Family  ADL Assistance: Independent  Homemaking Assistance: Needs assistance  Homemaking Responsibilities: Yes(pt reported sister assists with IADLs)  Meal Prep Responsibility: Secondary  Laundry Responsibility: Secondary  Cleaning Responsibility: Secondary  Ambulation Assistance: Independent  Transfer Assistance: Independent  Active : No  Patient's  Info: medical cab or sister drives  Occupation: On disability  Additional Comments: pt repored sister supportive and assists pt occasionally      Objective   Vision: Impaired  Vision Exceptions: Wears glasses at all times  Hearing: Within functional limits    Orientation  Overall Orientation Status: Within Functional Limits     Balance  Sitting Balance: Modified independent (~6 minutes on eOB )  Standing Balance: Contact guard assistance(with RW )  Standing Balance  Time: ~4 minutes   Activity: pt completed functional mobility to doorway and back to bed  Comment: pt with no LOB, butslightly SOB upon completion      ADL  Feeding: Modified independent   Grooming: Modified independent   UE Bathing: Stand by assistance  LE Bathing: Moderate assistance  UE Dressing: Stand by assistance  LE Dressing: Moderate assistance  Toileting: Minimal assistance  Tone RUE  RUE Tone: Normotonic  Tone LUE  LUE Tone: Normotonic  Coordination  Movements Are Fluid And Coordinated: Yes  Coordination and Movement description: Tremors; Left UE;Right UE     Bed mobility  Supine to Sit: Minimal assistance  Sit to Supine: Minimal assistance  Scooting: Minimal assistance  Transfers  Sit to stand: Minimal assistance  Stand to sit: Minimal assistance  Transfer Comments: with RW      Cognition  Overall Cognitive Status: WFL     Sensation  Overall Sensation Status: Impaired(pt reported N/T to B hands )      LUE AROM : Exceptions  L Shoulder Flexion 0-180: 0-90  L Elbow Flexion 0-145: WFL  L Wrist Flexion 0-80: WFL  L Wrist Extension 0-70: WFL  Left Hand AROM: WFL    RUE AROM : Exceptions  R Shoulder Flexion 0-180: 0-90  R Elbow Flexion 0-145: WFL  R Wrist Flexion 0-80: WFL  R Wrist Extension 0-70: WFL  Right Hand AROM: WFL    LUE Strength  Gross LUE Strength: Exceptions to [place] [place]  L Hand General: 4/5  RUE Strength  Gross RUE Strength: Exceptions to [facility] [place]  R Hand General: 4/5      Plan   Plan  Times per week: 3-4x/wk    AM-PAC Score   AM-PAC Inpatient Daily Activity Raw Score: 18 (01/27/20 1536)  AM-PAC Inpatient ADL T-Scale Score : 38.66 (01/27/20 1536)  ADL Inpatient CMS 0-100% Score: 46.65 (01/27/20 1536)  ADL Inpatient CMS G-Code Modifier : CK (01/27/20 1536)    Goals  Short term goals  Time Frame for Short term goals: pt will, by discharge  Short term goal 1: complete LB ADLS with min A, set up and AE, as needed  Short term goal 2: complete UB ADLs and grooming tasks with mod I and set up  Short term goal 3: dem ~8 minutes dynamic standing tolerance with supervision and LRD in order to complete functional tasks  Short term goal 4: dem supervision during functional transfers/functional mobility with LRD  Short term goal 5: increase activity tolerance to 20+ minutes in order to participate in daily tasks     Therapy Time   Individual Concurrent Group Co-treatment   Time In 0802         Time Out 0819         Minutes [facility], OTR/L

## 2020-01-27 NOTE — PLAN OF CARE
BRONCHOSPASM/BRONCHOCONSTRICTION     [x]         IMPROVE AERATION/BREATH SOUNDS  [x]   ADMINISTER BRONCHODILATOR THERAPY AS APPROPRIATE  [x]   ASSESS BREATH SOUNDS  [x]   IMPLEMENT AEROSOL/MDI PROTOCOL  [x]   PATIENT EDUCATION AS NEEDED   YULI agustinatient Assessment complete. Right sided weakness [R53.1] . Vitals:    01/27/20 0750   BP: (!) 153/84   Pulse: 90   Resp: 25   Temp: 98.7 °F (37.1 °C)   SpO2: 100%   . Patients home meds are   Prior to Admission medications    Medication Sig Start Date End Date Taking? Authorizing Provider   albuterol sulfate HFA (PROVENTIL HFA) 108 (90 Base) MCG/ACT inhaler Inhale 1-2 puffs into the lungs once for 1 dose 1/9/20 1/9/20  Basil Carlos MD   ondansetron (ZOFRAN ODT) 4 MG disintegrating tablet Take 1 tablet by mouth every 8 hours as needed for Nausea 9/9/19   Heber Mcdowell PA-C   docusate sodium (COLACE, DULCOLAX) 100 MG CAPS Take 100 mg by mouth 2 times daily 8/22/19   Hiro Arboleda MD   [facility] [place]  Devices [facility]) MISC 1 Device by Does not apply route once for 1 dose 8/5/19 8/5/19  Heber Mcdowell PA-C   hydrOXYzine (ATARAX) 25 MG tablet Take 1 tablet by mouth every 8 hours as needed for Itching 8/5/19   Heber Mcdowell PA-C   acetaminophen (TYLENOL) 325 MG tablet Take 1 tablet by mouth every 6 hours as needed for Pain 6/22/19   Tiago Barrett DO   benzonatate (TESSALON PERLES) 100 MG capsule Take 1 capsule by mouth 3 times daily as needed for Cough 3/11/19   Yandel Lopez DO   carvedilol (COREG) 6.25 MG tablet Take 1 tablet by mouth 2 times daily (with meals) 2/7/19   Elham Valverde MD   ferrous sulfate (MELISSA-HAO) 325 (65 Fe) MG tablet Take 1 tablet by mouth daily 2/7/19   Elham Valverde MD   melatonin 1 MG tablet Take 1 tablet by mouth nightly as needed for Sleep 1/11/19   Cinthia Barrett MD   ipratropium-albuterol (DUONEB) 0.5-2.5 (3) MG/3ML SOLN nebulizer solution Inhale 3 mLs into the lungs every 4 hours as needed for Shortness of Breath 1/11/19   George Thakkar MD   mometasone-formoterol [facility]) 100-5 MCG/ACT inhaler Inhale 2 puffs into the lungs 2 times daily 12/5/18   Fermin Islas MD   pantoprazole (PROTONIX) 40 MG tablet Take 1 tablet by mouth 2 times daily (before meals) 11/13/18   Jailyn Arciniega MD   folic acid (FOLVITE) 1 MG tablet Take 1 tablet by mouth daily 10/13/18   Jey Ko MD   vitamin B-1 100 MG tablet Take 1 tablet by mouth daily 10/13/18   Jey Ko MD   vitamin B-12 250 MCG tablet Take 1 tablet by mouth daily 10/13/18   Jey Ko MD   gabapentin (NEURONTIN) 300 MG capsule Take 300 mg by mouth 3 times daily. Paulo Cottrell     Historical Provider, MD   Multiple Vitamins-Minerals (THERAPEUTIC MULTIVITAMIN-MINERALS) tablet Take 1 tablet by mouth daily 8/12/18   Ky Millard MD   .  Recent Surgical History: None = 0     Assessment   Pt on room air  Pt states she takes aerosol txs at home PRN and takes an inhaler daily  Pt on room air, SPO2=99%  RR 18  Breath Sounds: decreased      Bronchodilator assessment at level  2  Hyperinflation assessment at level   Secretion Management assessment at level      [x]    Bronchodilator Assessment  BRONCHODILATOR ASSESSMENT SCORE  Score 0 1 2 3 4 5   Breath Sounds   []  Patient Baseline []  No Wheeze good aeration []  Faint, scattered wheezing, good aeration [x]  Expiratory Wheezing and or moderately diminished []  Insp/Exp wheeze and/or very diminished []  Insp/Exp and/ or marked distress   Respiratory Rate   []  Patient Baseline [x]  Less than 20 [x]  Less than 20 []  20-25 []  Greater than 25 []  Greater than 25   Peak flow % of Pred or PB [x]  NA   []  Greater than 90%  []  81-90% []  71-80% []  Less than or equal to 70%  or unable to perform []  Unable due to Respiratory Distress   Dyspnea re []  Patient Baseline [x]  No SOB [x]  No SOB []  SOB on exertion []  SOB min activity []  At rest/acute   e FEV% Predicted       [x]  NA []  Above 69%  []  Unable []  Above 60-69%  []  Unable []  Above 50-59%  []  Unable []  Above 35-49%  []  Unable []  Less than 35%  []  Unable                 []  Hyperinflation Assessment  Score 1 2 3   CXR and Breath Sounds   []  Clear []  No atelectasis  Basilar aeration []  Atelectasis or absent basilar breath sounds   Incentive Spirometry Volume  (Per IBW)   []  Greater than or equal to 15ml/Kg []  less than 15ml/Kg []  less than 15ml/Kg   Surgery within last 2 weeks []  None or general   []  Abdominal or thoracic surgery  []  Abdominal or thoracic   Chronic Pulmonary Historyre []  No []  Yes []  Yes     []  Secretion Management Assessment  Score 1 2 3   Bilateral Breath Sounds   []  Occasional Rhonchi []  Scattered Rhonchi []  Course Rhonchi and/or poor aeration   Sputum    []  Small amount of thin secretions []  Moderate amount of viscous secretions []  Copius, Viscious Yellow/ Secretions   CXR as reported by physician []  clear  []  Unavailable []  Infiltrates and/or consolidation  []  Unavailable []  Mucus Plugging and or lobar consolidation  []  Unavailable   Cough []  Strong, productive cough []  Weak productive cough []  No cough or weak non-productive cough   emma pressley  9:37 AM                            FEMALE                                  MALE                            FEV1 Predicted Normal Values                        FEV1 Predicted Normal Values          Age                                     Height in Feet and Inches       Age                                     Height in Feet and Inches       4' 11\" 5' 1\" 5' 3\" 5' 5\" 5' 7\" 5' 9\" 5' 11\" 6' 1\"  4' 11\" 5' 1\" 5' 3\" 5' 5\" 5' 7\" 5' 9\" 5' 11\" 6' 1\"   42 - 45 2.49 2.66 2.84 3.03 3.22 3.42 3.62 3.83 42 - 45 2.82 3.03 3.26 3.49 3.72 3.96 4.22 4.47   46 - 49 2.40 2.57 2.76 2.94 3.14 3.33 3.54 3.75 46 - 49 2.70 2.92 3.14 3.37 3.61 3.85 4.10 4.36   50 - 53 2.31 2.48 2.66 2.85 3.04 3.24 3.45 3.66 50 - 53 2.58 2.80 3.02 3.25 3.49 3.73 3.98 4.24   54 - 57 2.21 2.38 2.57 2.75 2.95 3.14 3.35 3.56 54 -

## 2020-01-27 NOTE — CONSULTS
[address]     Department of Internal Medicine - Staff Internal Medicine Teaching Service          ADMISSION NOTE/HISTORY AND PHYSICAL EXAMINATION   Date: 1/27/2020  Patient Name: Dylon Campuzano  Date of admission: 1/26/2020  6:58 PM  YOB: 1955  PCP: No primary care provider on file. History Obtained From:  patient, electronic medical record    Consult Reason:     Consult Reason: Dyspnea on exertion since 1 month, cough, lymphadenopathy - concern for multiple myeloma. HISTORY OF PRESENTING ILLNESS     The patient is a pleasant 75 y.o. female with past medical history of  - Benign essential hypertension  - Chronic diastolic congestive heart failure with grade 1 diastolic dysfunction  - COPD  - CVA  - Former smoker, smoked 1 PPD for 30 years, quit few days back. Patient was admitted to Neurology service with chief complaint of worsening memory, shakiness, aphasia and right-sided drift since 4 weeks. She was evaluated by neurology, CT head revealed no acute abnormality. MRI brain without contrast was done which revealed mild chronic sinusitis in maxillary sinuses with chronic microvascular disease with a focus of restricted diffusion in left posterior frontal lobe consistent with acute/ subacute ischemia. Internal Medicine was consulted due to c/o non productive cough since 4 weeks accompanying the above symptoms. Patient reports that she started having upper respiratory symptoms including nasal congestion and mild cough for weeks back, gradually worsened when she started having mild expectoration with inability to bring up the phlegm. She also reports associated mild difficulty in breathing, nonexertional, constant, unrelated to change in position, time of day or deep inspiration.   She denies fever, chills, chest pain, palpitations, postnasal drip, dizziness, lightheadedness, abdominal pain, altered bowel or bladder habits, sick contacts in the past, she did not get the flu shot.     Review of Systems:  General ROS: Completed and except as mentioned above were negative   HEENT ROS: Completed and except as mentioned above were negative   Allergy and Immunology ROS:  Completed and except as mentioned above were negative  Hematological and Lymphatic ROS:  Completed and except as mentioned above were negative  Respiratory ROS:  Completed and except as mentioned above were negative  Cardiovascular ROS:  Completed and except as mentioned above were negative  Gastrointestinal ROS: Completed and except as mentioned above were negative  Genito-Urinary ROS:  Completed and except as mentioned above were negative  Musculoskeletal ROS:  Completed and except as mentioned above were negative  Neurological ROS:  Completed and except as mentioned above were negative  Skin & Dermatological ROS:  Completed and except as mentioned above were negative  Psychological ROS:  Completed and except as mentioned above were negative    PAST MEDICAL HISTORY     Past Medical History:   Diagnosis Date    Appendicitis 3/27/2017    CHF (congestive heart failure) ([address]     Chronic respiratory failure with hypoxia and hypercapnia (HCC)     Chronic rhinitis     Cigarette smoker     COPD (chronic obstructive pulmonary disease) ([address] 2018    Depression     Dysphagia     Essential hypertension 2018    GERD (gastroesophageal reflux disease)     Heart failure, diastolic, with acute decompensation ([address] 2018    Hepatitis C, chronic ([address]     History of smoking at least 1 pack per day for at least 30 years     Hyperlipidemia     Hypertension     Migraine     Moderate COPD (chronic obstructive pulmonary disease) ([address]     Multiple transfusions     Osteoarthritis     hands    Pneumonia     Scoliosis     Substance abuse ([address]     ivda and cocaine abuse in past       PAST SURGICAL HISTORY     Past Surgical History:   Procedure Laterality Date    APPENDECTOMY  2017     SECTION      GASTRIC BYPASS SURGERY      HIP SURGERY Right 08/13/2019    HIP TOTAL ARTHROPLASTY ANTERIOR APPROACH - [place],  C-[facility], (Right )    LAPAROSCOPIC APPENDECTOMY N/A 3/27/2017    APPENDECTOMY LAPAROSCOPIC performed by Thierry Kim MD at [address] [facility]  2002    hgsil    MI COLONOSCOPY W/BIOPSY SINGLE/MULTIPLE N/A 11/10/2018    COLONOSCOPY WITH BIOPSY performed by Ivan Mobley MD at [facility]    MI EGD TRANSORAL BIOPSY SINGLE/MULTIPLE N/A 11/9/2018    EGD ESOPHAGOGASTRODUODENOSCOPY performed by Brooke Willett MD at [address] Right 8/13/2019    HIP TOTAL ARTHROPLASTY ANTERIOR APPROACH - [place],  C-ARM, performed by Mario Tyson DO at [place] [address]  2008    oesophagitis, candidiasis    UPPER GASTROINTESTINAL ENDOSCOPY  11/05/2018    UPPER GASTROINTESTINAL ENDOSCOPY N/A 11/5/2018    EGD CONTROL HEMORRHAGE performed by Brooke Willett MD at [address]     Aspirin and Keflet [cephalexin]    MEDICATIONS PRIOR TO ADMISSION     Prior to Admission medications    Medication Sig Start Date End Date Taking? Authorizing Provider   albuterol sulfate HFA (PROVENTIL HFA) 108 (90 Base) MCG/ACT inhaler Inhale 1-2 puffs into the lungs once for 1 dose 1/9/20 1/9/20  Diony James MD   ondansetron (ZOFRAN ODT) 4 MG disintegrating tablet Take 1 tablet by mouth every 8 hours as needed for Nausea 9/9/19   Roxana Anderson PA-C   docusate sodium (COLACE, DULCOLAX) 100 MG CAPS Take 100 mg by mouth 2 times daily 8/22/19   Turner Deleon MD   Misc.  Devices [facility]) MISC 1 Device by Does not apply route once for 1 dose 8/5/19 8/5/19  Roxana Anderson PA-C   hydrOXYzine (ATARAX) 25 MG tablet Take 1 tablet by mouth every 8 hours as needed for Itching 8/5/19   Roxana Anderson PA-C   acetaminophen (TYLENOL) 325 MG tablet Take 1 tablet by mouth every 6 hours as needed for Pain 6/22/19   Katelyn Edmond DO   benzonatate (TESSALON PERLES) 01/26/20  7:54 PM   Result Value Ref Range    Glucose 79 70 - 99 mg/dL    BUN 14 8 - 23 mg/dL    CREATININE 0.56 0.50 - 0.90 mg/dL    Bun/Cre Ratio NOT REPORTED 9 - 20    Calcium 9.1 8.6 - 10.4 mg/dL    Sodium 138 135 - 144 mmol/L    Potassium 4.1 3.7 - 5.3 mmol/L    Chloride 104 98 - 107 mmol/L    CO2 21 20 - 31 mmol/L    Anion Gap 13 9 - 17 mmol/L    Alkaline Phosphatase 109 (H) 35 - 104 U/L    ALT 20 5 - 33 U/L    AST 33 (H) <32 U/L    Total Bilirubin 0.55 0.3 - 1.2 mg/dL    Total Protein 7.7 6.4 - 8.3 g/dL    Alb 3.8 3.5 - 5.2 g/dL    Albumin/Globulin Ratio 1.0 1.0 - 2.5    GFR Non-African American >60 >60 mL/min    GFR African American >60 >60 mL/min    GFR Comment          GFR Staging NOT REPORTED    Troponin    Collection Time: 01/26/20  7:54 PM   Result Value Ref Range    Troponin, High Sensitivity 13 0 - 14 ng/L    Troponin T NOT REPORTED <0.03 ng/mL    Troponin Interp NOT REPORTED    Ethanol    Collection Time: 01/26/20  7:54 PM   Result Value Ref Range    Ethanol <10 <10 mg/dL    Ethanol percent <0.010 <0.010 %   SPECIMEN REJECTION    Collection Time: 01/26/20  8:53 PM   Result Value Ref Range    Specimen Source . BLOOD     Ordered Test CDP     Reason for Rejection Unable to perform testing: Specimen clotted.      - NOT REPORTED    CBC Auto Differential    Collection Time: 01/26/20  9:20 PM   Result Value Ref Range    WBC 6.5 3.5 - 11.3 k/uL    RBC 4.78 3.95 - 5.11 m/uL    Hemoglobin 12.8 11.9 - 15.1 g/dL    Hematocrit 43.8 36.3 - 47.1 %    MCV 91.6 82.6 - 102.9 fL    MCH 26.8 25.2 - 33.5 pg    MCHC 29.2 28.4 - 34.8 g/dL    RDW 19.2 (H) 11.8 - 14.4 %    Platelets 996 (H) 569 - 453 k/uL    MPV 8.8 8.1 - 13.5 fL    NRBC Automated 0.0 0.0 per 100 WBC    Differential Type NOT REPORTED     Seg Neutrophils 60 36 - 65 %    Lymphocytes 25 24 - 43 %    Monocytes 13 (H) 3 - 12 %    Eosinophils % 1 1 - 4 %    Basophils 0 0 - 2 %    Immature Granulocytes 1 (H) 0 %    Segs Absolute 3.83 1.50 - 8.10 k/uL    Absolute Lymph # 1.64 1.10 - 3.70 k/uL    Absolute Mono # 0.82 0.10 - 1.20 k/uL    Absolute Eos # 0.09 0.00 - 0.44 k/uL    Basophils Absolute <0.03 0.00 - 0.20 k/uL    Absolute Immature Granulocyte 0.09 0.00 - 0.30 k/uL    WBC Morphology NOT REPORTED     RBC Morphology ANISOCYTOSIS PRESENT     Platelet Estimate NOT REPORTED    Urinalysis, reflex to microscopic    Collection Time: 01/27/20 12:09 AM   Result Value Ref Range    Color, UA DARK YELLOW (A) YELLOW    Turbidity UA CLEAR CLEAR    Glucose, Ur NEGATIVE NEGATIVE    Bilirubin Urine NEGATIVE NEGATIVE    Ketones, Urine NEGATIVE NEGATIVE    Specific Gravity, UA 1.024 1.005 - 1.030    Urine Hgb NEGATIVE NEGATIVE    pH, UA 6.5 5.0 - 8.0    Protein, UA 1+ (A) NEGATIVE    Urobilinogen, Urine Normal Normal    Nitrite, Urine NEGATIVE NEGATIVE    Leukocyte Esterase, Urine TRACE (A) NEGATIVE    Urinalysis Comments NOT REPORTED    Microscopic Urinalysis    Collection Time: 01/27/20 12:09 AM   Result Value Ref Range    -          WBC, UA 0 TO 2 0 - 5 /HPF    RBC, UA 0 TO 2 0 - 4 /HPF    Casts UA  0 - 8 /LPF     5 TO 10 HYALINE Reference range defined for non-centrifuged specimen. Crystals UA NOT REPORTED None /HPF    Epithelial Cells UA 0 TO 2 0 - 5 /HPF    Renal Epithelial, Urine NOT REPORTED 0 /HPF    Bacteria, UA NOT REPORTED None    Mucus, UA NOT REPORTED None    Trichomonas, UA NOT REPORTED None    Amorphous, UA NOT REPORTED None    Other Observations UA NOT REPORTED NOT REQ.     Yeast, UA NOT REPORTED None   Urine Drug Screen    Collection Time: 01/27/20 12:09 AM   Result Value Ref Range    Amphetamine Screen, Ur NEGATIVE NEGATIVE    Barbiturate Screen, Ur POSITIVE (A) NEGATIVE    Benzodiazepine Screen, Urine NEGATIVE NEGATIVE    Cocaine Metabolite, Urine NEGATIVE NEGATIVE    Methadone Screen, Urine NEGATIVE NEGATIVE    Opiates, Urine NEGATIVE NEGATIVE    Phencyclidine, Urine NEGATIVE NEGATIVE    Propoxyphene, Urine NOT REPORTED NEGATIVE    Cannabinoid Scrn, Ur NEGATIVE NEGATIVE    Oxycodone Screen, Ur NEGATIVE NEGATIVE    Methamphetamine, Urine NOT REPORTED NEGATIVE    Tricyclic Antidepressants, Urine NOT REPORTED NEGATIVE    MDMA, Urine NOT REPORTED NEGATIVE    Buprenorphine Urine NOT REPORTED NEGATIVE    Test Information       Assay provides medical screening only. The absence of expected drug(s) and/or metabolite(s) may indicate diluted or adulterated urine, limitations of testing or timing of collection.    EKG 12 Lead    Collection Time: 01/27/20 12:11 AM   Result Value Ref Range    Ventricular Rate 92 BPM    Atrial Rate 92 BPM    P-R Interval 146 ms    QRS Duration 66 ms    Q-T Interval 320 ms    QTc Calculation (Bazett) 395 ms    P Axis 71 degrees    R Axis 65 degrees    T Axis -96 degrees   Troponin    Collection Time: 01/27/20 12:27 AM   Result Value Ref Range    Troponin, High Sensitivity 13 0 - 14 ng/L    Troponin T NOT REPORTED <0.03 ng/mL    Troponin Interp NOT REPORTED    Hemoglobin A1c    Collection Time: 01/27/20  4:48 AM   Result Value Ref Range    Hemoglobin A1C 5.3 4.0 - 6.0 %    Estimated Avg Glucose 105 mg/dL   Lipid panel - fasting    Collection Time: 01/27/20  4:48 AM   Result Value Ref Range    Cholesterol 182 <200 mg/dL    HDL 77 >40 mg/dL    LDL Cholesterol 81 0 - 130 mg/dL    Chol/HDL Ratio 2.4 <5    Triglycerides 121 <150 mg/dL    VLDL NOT REPORTED 1 - 30 mg/dL   Basic Metabolic Panel w/ Reflex to MG    Collection Time: 01/27/20  4:48 AM   Result Value Ref Range    Glucose 86 70 - 99 mg/dL    BUN 16 8 - 23 mg/dL    CREATININE 0.55 0.50 - 0.90 mg/dL    Bun/Cre Ratio NOT REPORTED 9 - 20    Calcium 8.7 8.6 - 10.4 mg/dL    Sodium 142 135 - 144 mmol/L    Potassium 4.2 3.7 - 5.3 mmol/L    Chloride 107 98 - 107 mmol/L    CO2 17 (L) 20 - 31 mmol/L    Anion Gap 18 (H) 9 - 17 mmol/L    GFR Non-African American >60 >60 mL/min    GFR African American >60 >60 mL/min    GFR Comment          GFR Staging NOT REPORTED    CBC auto differential    Collection Time: 01/27/20  4:48 AM   Result Value Ref Range    WBC 6.0 3.5 - 11.3 k/uL    RBC 4.54 3.95 - 5.11 m/uL    Hemoglobin 12.0 11.9 - 15.1 g/dL    Hematocrit 41.7 36.3 - 47.1 %    MCV 91.9 82.6 - 102.9 fL    MCH 26.4 25.2 - 33.5 pg    MCHC 28.8 28.4 - 34.8 g/dL    RDW 19.4 (H) 11.8 - 14.4 %    Platelets 787 (H) 891 - 453 k/uL    MPV 9.0 8.1 - 13.5 fL    NRBC Automated 0.0 0.0 per 100 WBC    Differential Type NOT REPORTED     Seg Neutrophils 55 36 - 65 %    Lymphocytes 25 24 - 43 %    Monocytes 17 (H) 3 - 12 %    Eosinophils % 2 1 - 4 %    Basophils 0 0 - 2 %    Immature Granulocytes 1 (H) 0 %    Segs Absolute 3.29 1.50 - 8.10 k/uL    Absolute Lymph # 1.53 1.10 - 3.70 k/uL    Absolute Mono # 1.03 0.10 - 1.20 k/uL    Absolute Eos # 0.09 0.00 - 0.44 k/uL    Basophils Absolute <0.03 0.00 - 0.20 k/uL    Absolute Immature Granulocyte 0.08 0.00 - 0.30 k/uL    WBC Morphology NOT REPORTED     RBC Morphology ANISOCYTOSIS PRESENT     Platelet Estimate NOT REPORTED    Iron and TIBC    Collection Time: 01/27/20  4:48 AM   Result Value Ref Range    Iron 40 37 - 145 ug/dL    TIBC 456 (H) 250 - 450 ug/dL    Iron Saturation 9 (L) 20 - 55 %    UIBC 416 (H) 112 - 347 ug/dL   TSH with Reflex    Collection Time: 01/27/20  4:48 AM   Result Value Ref Range    TSH 1.17 0.30 - 5.00 mIU/L   Vitamin D 25 Hydroxy    Collection Time: 01/27/20  4:48 AM   Result Value Ref Range    Vit D, 25-Hydroxy 8.9 (L) 30.0 - 100.0 ng/mL       Imaging:   Xr Chest Standard (2 Vw)    Result Date: 1/26/2020  No acute process. Ct Head Wo Contrast    Result Date: 1/26/2020  1. Mild paranasal sinus disease as above. 2. Mild atrophy. Atherosclerotic calcification of the vasculature. Mild chronic small vessel ischemic white matter disease, stable. If there is clinical concern for acute on chronic ischemia, an MRI with diffusion-weighted imaging would be a more sensitive exam assuming there are no contraindications to MRI.  3. No clear evidence for acute intracranial

## 2020-01-27 NOTE — ED NOTES
Patient transferred to [facility] by stretcher by transporter.   NAD, resting Tanner Flor, [facility]  01/26/20 [id number]

## 2020-01-27 NOTE — ED NOTES
3 unsuccessful PIV attempts by 2 RN's, Medic paged to place US PIV      Kelvin Hodge, ANTLEMO  01/26/20 9900

## 2020-01-27 NOTE — ED NOTES
Pt's PIV infiltrated, pt is a very difficult IV stick, Resident aware and says it's ok to leave IV out and push oral fluids.       Blanca Ahumada RN  01/26/20 [id number]

## 2020-01-27 NOTE — PROGRESS NOTES
folic acid (FOLVITE) 1 MG tablet Take 1 tablet by mouth daily 10/13/18   Naima Millard MD   vitamin B-1 100 MG tablet Take 1 tablet by mouth daily 10/13/18   Naima Millard MD   vitamin B-12 250 MCG tablet Take 1 tablet by mouth daily 10/13/18   Naima Millard MD   gabapentin (NEURONTIN) 300 MG capsule Take 300 mg by mouth 3 times daily. Hunter Noland     Historical Provider, MD   Multiple Vitamins-Minerals (THERAPEUTIC MULTIVITAMIN-MINERALS) tablet Take 1 tablet by mouth daily 8/12/18   Ivone Up MD   .  Recent Surgical History: None = 0     Assessment     Peak Flow (asthma only)    Predicted: 0  Personal Best: 0  PEF 0  % Predicted 0  Peak Flow : not applicable = 0    SFF0/XRU    FEV1 Predicted 0      FEV1 0    FEV1 % Predicted 0  FVC 0  IS volume 0  IBW 0    RR 20  Breath Sounds: Clear and dim bilaterally      · Bronchodilator assessment at level 1  · Hyperinflation assessment at level   · Secretion Management assessment at level    ·   · []    Bronchodilator Assessment  BRONCHODILATOR ASSESSMENT SCORE  Score 0 1 2 3 4 5   Breath Sounds   [x]  Patient Baseline []  No Wheeze good aeration []  Faint, scattered wheezing, good aeration []  Expiratory Wheezing and or moderately diminished []  Insp/Exp wheeze and/or very diminished []  Insp/Exp and/ or marked distress   Respiratory Rate   [x]  Patient Baseline []  Less than 20 []  Less than 20 []  20-25 []  Greater than 25 []  Greater than 25   Peak flow % of Pred or PB [x]  NA   []  Greater than 90%  []  81-90% []  71-80% []  Less than or equal to 70%  or unable to perform []  Unable due to Respiratory Distress   Dyspnea re [x]  Patient Baseline []  No SOB []  No SOB []  SOB on exertion []  SOB min activity []  At rest/acute   e FEV% Predicted       [x]  NA []  Above 69%  []  Unable []  Above 60-69%  []  Unable []  Above 50-59%  []  Unable []  Above 35-49%  []  Unable []  Less than 35%  []  Unable                 []  Hyperinflation Assessment  Score 1 2 3   CXR and Breath Sounds   []  Clear []  No atelectasis  Basilar aeration []  Atelectasis or absent basilar breath sounds   Incentive Spirometry Volume  (Per IBW)   []  Greater than or equal to 15ml/Kg []  less than 15ml/Kg []  less than 15ml/Kg   Surgery within last 2 weeks []  None or general   []  Abdominal or thoracic surgery  []  Abdominal or thoracic   Chronic Pulmonary Historyre []  No []  Yes []  Yes     []  Secretion Management Assessment  Score 1 2 3   Bilateral Breath Sounds   []  Occasional Rhonchi []  Scattered Rhonchi []  Course Rhonchi and/or poor aeration   Sputum    []  Small amount of thin secretions []  Moderate amount of viscous secretions []  Copius, Viscious Yellow/ Secretions   CXR as reported by physician []  clear  []  Unavailable []  Infiltrates and/or consolidation  []  Unavailable []  Mucus Plugging and or lobar consolidation  []  Unavailable   Cough []  Strong, productive cough []  Weak productive cough []  No cough or weak non-productive cough   [place] MO  11:42 PM                            FEMALE                                  MALE                            FEV1 Predicted Normal Values                        FEV1 Predicted Normal Values          Age                                     Height in Feet and Inches       Age                                     Height in Feet and Inches       4' 11\" 5' 1\" 5' 3\" 5' 5\" 5' 7\" 5' 9\" 5' 11\" 6' 1\"  4' 11\" 5' 1\" 5' 3\" 5' 5\" 5' 7\" 5' 9\" 5' 11\" 6' 1\"   42 - 45 2.49 2.66 2.84 3.03 3.22 3.42 3.62 3.83 42 - 45 2.82 3.03 3.26 3.49 3.72 3.96 4.22 4.47   46 - 49 2.40 2.57 2.76 2.94 3.14 3.33 3.54 3.75 46 - 49 2.70 2.92 3.14 3.37 3.61 3.85 4.10 4.36   50 - 53 2.31 2.48 2.66 2.85 3.04 3.24 3.45 3.66 50 - 53 2.58 2.80 3.02 3.25 3.49 3.73 3.98 4.24   54 - 57 2.21 2.38 2.57 2.75 2.95 3.14 3.35 3.56 54 - 57 2.46 2.67 2.89 3.12 3.36 3.60 3.85 4.11   58 - 61 2.10 2.28 2.46 2.65 2.84 3.04 3.24 3.45 58 - 61 2.32 2.54 2.76 2.99 3.23 3.47 3.72 3.98   62 - 65 1.99 2.17 2. 35 2.54 2.73 2.93 3.13 3.34 62 - 65 2.19 2.40 2.62 2.85 3.09 3.33 3.58 3.84   66 - 69 1.88 2.05 2.23 2.42 2.61 2.81 3.02 3.23 66 - 69 2.04 2.26 2.48 2.71 2.95 3.19 3.44 3.70   70+ 1.82 1.99 2.17 2.36 2.55 2.75 2.95 3.16 70+ 1.97 2.19 2.41 2.64 2.87 3.12 3.37 3.62             Predicted Peak Expiratory Flow Rate                                       Height (in)  Female       Height (in) Male           Age 64 62 64 63 57 71 78 74 Age            21 344 357 372 387 402 417 432 446  60 62 64 66 68 70 72 74 76   25 337 352 366 381 396 411 426 441 25 447 476 505 533 562 [phone]   30 329 344 359 374 389 404 419 434 30 437 466 494 523 552 580 [phone]   35 322 337 351 366 381 396 411 426 35 426 455 484 512 541 [phone]   40 314 329 344 359 374 389 404 419 40 416 445 473 502 531 [phone] 647   45 307 322 336 351 366 381 396 411 45 405 434 463 491 520 [phone] 636   50 299 314 329 344 359 374 389 404 50 395 424 452 481 510 [phone] 625   55 292 307 321 336 351 366 381 396 55 384 413 442 470 499 [phone] 615   60 284 299 314 329 344 359 374 389 60 374 403 431 460 489 517 546 575 605   65 277 292 306 321 336 351 366 381 65 363 392 421 449 478 507 535 564 594   70 269 284 299 314 329 344 359 374 70 353 382 410 439 468 [phone] 583   75 261 274 289 305 319 334 348 364 75 344 372 400 429 458 487 515 544 573   80 253 266 282 296 312 327 342 356 80 335 362 390 419 448 476 [phone]

## 2020-01-27 NOTE — ED PROVIDER NOTES
[facility]  Emergency Department Encounter  EmergencyMedicine Resident     Pt Name:Emi Saunders  MRN: [id number]  Birthdate 1955  Date of evaluation: 1/26/20  PCP:  No primary care provider on file. CHIEF COMPLAINT       Chief Complaint   Patient presents with    Cough    Urinary Frequency       HISTORY OF PRESENT ILLNESS  (Location/Symptom, Timing/Onset, Context/Setting, Quality, Duration, Modifying Factors, Severity.)      Emi Daniels is a 75 y.o. female who presents to the ED today via EMS for complaints of altered mental status and productive cough the past 4 weeks. Per EMS they were called by bystanders because patient was being disruptive and acting confused. In addition to productive cough patient is also complaining of urinary symptoms. She does report a history of COPD. States that her cough is been dry. Denies any chest pain, headache, dizziness, numbness or weakness. Of note patient was seen on 1/9 for complaints of shortness of breath, cough fatigue and generalized weakness. At that time patient was also complaining of cough for 4 weeks. Treated with prednisone given albuterol refill. Spoke with case management to discuss home health options. At this time patient continues to live herself. Patient is poor historian. Unable to recall most of her past medical problems or medications. States that she takes her medications when she has some but does not have some.     PAST MEDICAL / SURGICAL / SOCIAL / FAMILY HISTORY      has a past medical history of Appendicitis, CHF (congestive heart failure) ([address] Chronic respiratory failure with hypoxia and hypercapnia (HCC), Chronic rhinitis, Cigarette smoker, COPD (chronic obstructive pulmonary disease) ([address] Depression, Dysphagia, Essential hypertension, GERD (gastroesophageal reflux disease), Heart failure, diastolic, with acute decompensation ([address] Hepatitis C, chronic ([address] History of smoking at least 1 pack per Attends meetings of clubs or organizations: Not on file     Relationship status: Not on file    Intimate partner violence:     Fear of current or ex partner: Not on file     Emotionally abused: Not on file     Physically abused: Not on file     Forced sexual activity: Not on file   Other Topics Concern    Not on file   Social History Narrative    Not on file       Family History   Problem Relation Age of Onset    Breast Cancer Mother     Heart Disease Father        Allergies:  Aspirin and Henery Ball [cephalexin]    Home Medications:  Prior to Admission medications    Medication Sig Start Date End Date Taking? Authorizing Provider   albuterol sulfate HFA (PROVENTIL HFA) 108 (90 Base) MCG/ACT inhaler Inhale 1-2 puffs into the lungs once for 1 dose 1/9/20 1/9/20  Silvia Conley MD   ondansetron (ZOFRAN ODT) 4 MG disintegrating tablet Take 1 tablet by mouth every 8 hours as needed for Nausea 9/9/19   Krystina Pemberton PA-C   docusate sodium (COLACE, DULCOLAX) 100 MG CAPS Take 100 mg by mouth 2 times daily 8/22/19   Sam Ramírez MD   Mis.  Devices [facility]) MISC 1 Device by Does not apply route once for 1 dose 8/5/19 8/5/19  Krystina Pemberton PA-C   hydrOXYzine (ATARAX) 25 MG tablet Take 1 tablet by mouth every 8 hours as needed for Itching 8/5/19   Krystina Pemberton PA-C   acetaminophen (TYLENOL) 325 MG tablet Take 1 tablet by mouth every 6 hours as needed for Pain 6/22/19   Yung Leonard,    benzonatate (TESSALON PERLES) 100 MG capsule Take 1 capsule by mouth 3 times daily as needed for Cough 3/11/19   Shayne Lopez DO   carvedilol (COREG) 6.25 MG tablet Take 1 tablet by mouth 2 times daily (with meals) 2/7/19   Carla Chase MD   ferrous sulfate (MELISSA-HAO) 325 (65 Fe) MG tablet Take 1 tablet by mouth daily 2/7/19   Carla Chase MD   melatonin 1 MG tablet Take 1 tablet by mouth nightly as needed for Sleep 1/11/19   Concepcion Horner MD   ipratropium-albuterol (DUONEB) 0.5-2.5 (3) MG/3ML SOLN nebulizer acute distress. Appearance: Normal appearance. She is not ill-appearing or toxic-appearing. Comments: Disheveled   HENT:      Head: Normocephalic and atraumatic. Nose: Nose normal.      Mouth/Throat:      Mouth: Mucous membranes are dry. Pharynx: No oropharyngeal exudate or posterior oropharyngeal erythema. Eyes:      Extraocular Movements: Extraocular movements intact. Conjunctiva/sclera: Conjunctivae normal.      Pupils: Pupils are equal, round, and reactive to light. Neck:      Musculoskeletal: Normal range of motion. No muscular tenderness. Cardiovascular:      Rate and Rhythm: Normal rate and regular rhythm. Pulses: Normal pulses. Pulmonary:      Effort: Pulmonary effort is normal. No respiratory distress. Breath sounds: No stridor. No wheezing, rhonchi or rales. Comments: Coarse breath sounds  Abdominal:      General: There is no distension. Palpations: Abdomen is soft. Tenderness: There is no abdominal tenderness. Musculoskeletal: Normal range of motion. General: No swelling or deformity. Skin:     General: Skin is warm and dry. Findings: No rash. Neurological:      Mental Status: She is alert and oriented to person, place, and time. Sensory: No sensory deficit. Comments: Slightly slow to respond to questions. Tremor present in upper extremities and mild pronator drift on the right. Right UE and LE weakness.  LE > UE   Psychiatric:         Behavior: Behavior normal.         DIFFERENTIAL  DIAGNOSIS     PLAN (LABS / IMAGING / EKG):  Orders Placed This Encounter   Procedures    Urine Culture    XR CHEST STANDARD (2 VW)    CT Head WO Contrast    MRI brain without contrast    Comprehensive Metabolic Panel w/ Reflex to MG    Urinalysis, reflex to microscopic    PREVIOUS SPECIMEN    Ethanol    SPECIMEN REJECTION    CBC Auto Differential    PREVIOUS SPECIMEN    Hemoglobin A1c    Lipid panel - fasting    Basic Metabolic DIAGNOSTIC RESULTS / EMERGENCY DEPARTMENT COURSE / MDM     Results for orders placed or performed during the hospital encounter of 01/26/20   Comprehensive Metabolic Panel w/ Reflex to MG   Result Value Ref Range    Glucose 79 70 - 99 mg/dL    BUN 14 8 - 23 mg/dL    CREATININE 0.56 0.50 - 0.90 mg/dL    Bun/Cre Ratio NOT REPORTED 9 - 20    Calcium 9.1 8.6 - 10.4 mg/dL    Sodium 138 135 - 144 mmol/L    Potassium 4.1 3.7 - 5.3 mmol/L    Chloride 104 98 - 107 mmol/L    CO2 21 20 - 31 mmol/L    Anion Gap 13 9 - 17 mmol/L    Alkaline Phosphatase 109 (H) 35 - 104 U/L    ALT 20 5 - 33 U/L    AST 33 (H) <32 U/L    Total Bilirubin 0.55 0.3 - 1.2 mg/dL    Total Protein 7.7 6.4 - 8.3 g/dL    Alb 3.8 3.5 - 5.2 g/dL    Albumin/Globulin Ratio 1.0 1.0 - 2.5    GFR Non-African American >60 >60 mL/min    GFR African American >60 >60 mL/min    GFR Comment          GFR Staging NOT REPORTED    Troponin   Result Value Ref Range    Troponin, High Sensitivity 13 0 - 14 ng/L    Troponin T NOT REPORTED <0.03 ng/mL    Troponin Interp NOT REPORTED    Ethanol   Result Value Ref Range    Ethanol <10 <10 mg/dL    Ethanol percent <0.010 <0.010 %   SPECIMEN REJECTION   Result Value Ref Range    Specimen Source . BLOOD     Ordered Test CDP     Reason for Rejection Unable to perform testing: Specimen clotted.      - NOT REPORTED    CBC Auto Differential   Result Value Ref Range    WBC 6.5 3.5 - 11.3 k/uL    RBC 4.78 3.95 - 5.11 m/uL    Hemoglobin 12.8 11.9 - 15.1 g/dL    Hematocrit 43.8 36.3 - 47.1 %    MCV 91.6 82.6 - 102.9 fL    MCH 26.8 25.2 - 33.5 pg    MCHC 29.2 28.4 - 34.8 g/dL    RDW 19.2 (H) 11.8 - 14.4 %    Platelets 524 (H) 770 - 453 k/uL    MPV 8.8 8.1 - 13.5 fL    NRBC Automated 0.0 0.0 per 100 WBC    Differential Type NOT REPORTED     Seg Neutrophils 60 36 - 65 %    Lymphocytes 25 24 - 43 %    Monocytes 13 (H) 3 - 12 %    Eosinophils % 1 1 - 4 %    Basophils 0 0 - 2 %    Immature Granulocytes 1 (H) 0 %    Segs Absolute 3.83 1.50 - 8.10 k/uL    Absolute Lymph # 1.64 1.10 - 3.70 k/uL    Absolute Mono # 0.82 0.10 - 1.20 k/uL    Absolute Eos # 0.09 0.00 - 0.44 k/uL    Basophils Absolute <0.03 0.00 - 0.20 k/uL    Absolute Immature Granulocyte 0.09 0.00 - 0.30 k/uL    WBC Morphology NOT REPORTED     RBC Morphology ANISOCYTOSIS PRESENT     Platelet Estimate NOT REPORTED    EKG 12 Lead   Result Value Ref Range    Ventricular Rate 92 BPM    Atrial Rate 92 BPM    P-R Interval 146 ms    QRS Duration 66 ms    Q-T Interval 320 ms    QTc Calculation (Bazett) 395 ms    P Axis 71 degrees    R Axis 65 degrees    T Axis -96 degrees       RADIOLOGY:  CT Head WO Contrast   Preliminary Result   1. Mild paranasal sinus disease as above. 2. Mild atrophy. Atherosclerotic calcification of the vasculature. Mild   chronic small vessel ischemic white matter disease, stable. If there is   clinical concern for acute on chronic ischemia, an MRI with   diffusion-weighted imaging would be a more sensitive exam assuming there are   no contraindications to MRI. 3. No clear evidence for acute intracranial hemorrhage or midline shift. XR CHEST STANDARD (2 VW)   Final Result   No acute process. MRI brain without contrast    (Results Pending)        EKG  Normal sinus rhythm, rate of 92, normal axis, left atrial enlargement, Twaves inverted in inferior leads and V2-V6, no acute ST changes    All EKG's are interpreted by the Emergency Department Physician who either signs or Co-signs this chart in the absence of a cardiologist.    MDM/IMPRESSION/EMERGENCY DEPARTMENT COURSE:  Patient came to emergency department, HPI and physical exam were conducted. All nursing notes were reviewed. 75year old female presenting to the ED for altered mental status and cough. EMS called by bystanders bc pt being disruptive. Pt coming from home where she lives by herself. Per EMS pt confused. Complaining of cough for the past 4 weeks. Also having urinary complaints.  Admits 10:08:51 PM      PATIENT REFERRED TO:  No follow-up provider specified.     DISCHARGE MEDICATIONS:  Current Discharge Medication List          Shashank Levin DO  Emergency Medicine Resident    (Please note that portions of thisnote were completed with a voice recognition program.  Efforts were made to edit the dictations but occasionally words are mis-transcribed.)       Shashank Levin DO  Resident  01/26/20 [id number]

## 2020-01-27 NOTE — PROGRESS NOTES
Due to Spiriva being ordered the Duoneb was changed to prn and the Albuterol was ordered 3 times a day.

## 2020-01-28 LAB
CULTURE: NORMAL
Lab: NORMAL
SPECIMEN DESCRIPTION: NORMAL

## 2020-01-30 ENCOUNTER — APPOINTMENT (OUTPATIENT)
Dept: GENERAL RADIOLOGY | Age: 65
End: 2020-01-30
Payer: MEDICARE

## 2020-01-30 ENCOUNTER — APPOINTMENT (OUTPATIENT)
Dept: CT IMAGING | Age: 65
End: 2020-01-30
Payer: MEDICARE

## 2020-01-30 ENCOUNTER — HOSPITAL ENCOUNTER (OUTPATIENT)
Age: 65
Setting detail: OBSERVATION
Discharge: HOME | End: 2020-01-31
Attending: EMERGENCY MEDICINE | Admitting: PSYCHIATRY & NEUROLOGY
Payer: MEDICARE

## 2020-01-30 PROBLEM — I63.9 STROKE WITH CEREBRAL ISCHEMIA (HCC): Status: ACTIVE | Noted: 2020-01-30

## 2020-01-30 LAB
ABSOLUTE EOS #: 0.08 K/UL (ref 0–0.44)
ABSOLUTE IMMATURE GRANULOCYTE: 0.03 K/UL (ref 0–0.3)
ABSOLUTE LYMPH #: 1.62 K/UL (ref 1.1–3.7)
ABSOLUTE MONO #: 0.78 K/UL (ref 0.1–1.2)
ALBUMIN SERPL-MCNC: 3.8 G/DL (ref 3.5–5.2)
ALBUMIN/GLOBULIN RATIO: 1 (ref 1–2.5)
ALP BLD-CCNC: 121 U/L (ref 35–104)
ALT SERPL-CCNC: 18 U/L (ref 5–33)
ANION GAP SERPL CALCULATED.3IONS-SCNC: 11 MMOL/L (ref 9–17)
AST SERPL-CCNC: 36 U/L
BASOPHILS # BLD: 1 % (ref 0–2)
BASOPHILS ABSOLUTE: 0.03 K/UL (ref 0–0.2)
BILIRUB SERPL-MCNC: 0.47 MG/DL (ref 0.3–1.2)
BUN BLDV-MCNC: 9 MG/DL (ref 8–23)
BUN/CREAT BLD: ABNORMAL (ref 9–20)
CALCIUM SERPL-MCNC: 9.2 MG/DL (ref 8.6–10.4)
CHLORIDE BLD-SCNC: 104 MMOL/L (ref 98–107)
CO2: 23 MMOL/L (ref 20–31)
CREAT SERPL-MCNC: 0.57 MG/DL (ref 0.5–0.9)
DIFFERENTIAL TYPE: ABNORMAL
EOSINOPHILS RELATIVE PERCENT: 1 % (ref 1–4)
GFR AFRICAN AMERICAN: >60 ML/MIN
GFR NON-AFRICAN AMERICAN: >60 ML/MIN
GFR SERPL CREATININE-BSD FRML MDRD: ABNORMAL ML/MIN/{1.73_M2}
GFR SERPL CREATININE-BSD FRML MDRD: ABNORMAL ML/MIN/{1.73_M2}
GLUCOSE BLD-MCNC: 65 MG/DL (ref 65–105)
GLUCOSE BLD-MCNC: 81 MG/DL (ref 70–99)
HCT VFR BLD CALC: 42.7 % (ref 36.3–47.1)
HEMOGLOBIN: 12.2 G/DL (ref 11.9–15.1)
IMMATURE GRANULOCYTES: 1 %
LYMPHOCYTES # BLD: 29 % (ref 24–43)
MCH RBC QN AUTO: 26.3 PG (ref 25.2–33.5)
MCHC RBC AUTO-ENTMCNC: 28.6 G/DL (ref 28.4–34.8)
MCV RBC AUTO: 92 FL (ref 82.6–102.9)
MONOCYTES # BLD: 14 % (ref 3–12)
NRBC AUTOMATED: 0 PER 100 WBC
PDW BLD-RTO: 18.6 % (ref 11.8–14.4)
PLATELET # BLD: 524 K/UL (ref 138–453)
PLATELET ESTIMATE: ABNORMAL
PMV BLD AUTO: 9.2 FL (ref 8.1–13.5)
POTASSIUM SERPL-SCNC: 3.6 MMOL/L (ref 3.7–5.3)
RBC # BLD: 4.64 M/UL (ref 3.95–5.11)
RBC # BLD: ABNORMAL 10*6/UL
SEG NEUTROPHILS: 54 % (ref 36–65)
SEGMENTED NEUTROPHILS ABSOLUTE COUNT: 3.06 K/UL (ref 1.5–8.1)
SODIUM BLD-SCNC: 138 MMOL/L (ref 135–144)
TOTAL PROTEIN: 7.7 G/DL (ref 6.4–8.3)
WBC # BLD: 5.6 K/UL (ref 3.5–11.3)
WBC # BLD: ABNORMAL 10*3/UL

## 2020-01-30 PROCEDURE — 99285 EMERGENCY DEPT VISIT HI MDM: CPT

## 2020-01-30 PROCEDURE — 36415 COLL VENOUS BLD VENIPUNCTURE: CPT

## 2020-01-30 PROCEDURE — 85651 RBC SED RATE NONAUTOMATED: CPT

## 2020-01-30 PROCEDURE — 2580000003 HC RX 258: Performed by: STUDENT IN AN ORGANIZED HEALTH CARE EDUCATION/TRAINING PROGRAM

## 2020-01-30 PROCEDURE — 99223 1ST HOSP IP/OBS HIGH 75: CPT | Performed by: PSYCHIATRY & NEUROLOGY

## 2020-01-30 PROCEDURE — 85025 COMPLETE CBC W/AUTO DIFF WBC: CPT

## 2020-01-30 PROCEDURE — 82947 ASSAY GLUCOSE BLOOD QUANT: CPT

## 2020-01-30 PROCEDURE — 94640 AIRWAY INHALATION TREATMENT: CPT

## 2020-01-30 PROCEDURE — 6360000002 HC RX W HCPCS: Performed by: STUDENT IN AN ORGANIZED HEALTH CARE EDUCATION/TRAINING PROGRAM

## 2020-01-30 PROCEDURE — 6370000000 HC RX 637 (ALT 250 FOR IP): Performed by: STUDENT IN AN ORGANIZED HEALTH CARE EDUCATION/TRAINING PROGRAM

## 2020-01-30 PROCEDURE — 96375 TX/PRO/DX INJ NEW DRUG ADDON: CPT

## 2020-01-30 PROCEDURE — G0378 HOSPITAL OBSERVATION PER HR: HCPCS

## 2020-01-30 PROCEDURE — 96374 THER/PROPH/DIAG INJ IV PUSH: CPT

## 2020-01-30 PROCEDURE — 96372 THER/PROPH/DIAG INJ SC/IM: CPT

## 2020-01-30 PROCEDURE — 71046 X-RAY EXAM CHEST 2 VIEWS: CPT

## 2020-01-30 PROCEDURE — 1200000000 HC SEMI PRIVATE

## 2020-01-30 PROCEDURE — 70450 CT HEAD/BRAIN W/O DYE: CPT

## 2020-01-30 PROCEDURE — 80053 COMPREHEN METABOLIC PANEL: CPT

## 2020-01-30 RX ORDER — MAGNESIUM SULFATE 1 G/100ML
1 INJECTION INTRAVENOUS PRN
Status: DISCONTINUED | OUTPATIENT
Start: 2020-01-30 | End: 2020-01-31 | Stop reason: HOSPADM

## 2020-01-30 RX ORDER — THIAMINE MONONITRATE (VIT B1) 100 MG
100 TABLET ORAL DAILY
Status: DISCONTINUED | OUTPATIENT
Start: 2020-01-30 | End: 2020-01-31 | Stop reason: HOSPADM

## 2020-01-30 RX ORDER — SODIUM CHLORIDE 0.9 % (FLUSH) 0.9 %
10 SYRINGE (ML) INJECTION PRN
Status: DISCONTINUED | OUTPATIENT
Start: 2020-01-30 | End: 2020-01-31 | Stop reason: HOSPADM

## 2020-01-30 RX ORDER — FOLIC ACID 1 MG/1
1 TABLET ORAL DAILY
Status: DISCONTINUED | OUTPATIENT
Start: 2020-01-30 | End: 2020-01-31 | Stop reason: HOSPADM

## 2020-01-30 RX ORDER — LABETALOL 20 MG/4 ML (5 MG/ML) INTRAVENOUS SYRINGE
10 EVERY 10 MIN PRN
Status: DISCONTINUED | OUTPATIENT
Start: 2020-01-30 | End: 2020-01-31 | Stop reason: HOSPADM

## 2020-01-30 RX ORDER — CLOPIDOGREL BISULFATE 75 MG/1
75 TABLET ORAL DAILY
Status: DISCONTINUED | OUTPATIENT
Start: 2020-01-30 | End: 2020-01-31 | Stop reason: HOSPADM

## 2020-01-30 RX ORDER — PROCHLORPERAZINE EDISYLATE 5 MG/ML
10 INJECTION INTRAMUSCULAR; INTRAVENOUS ONCE
Status: COMPLETED | OUTPATIENT
Start: 2020-01-30 | End: 2020-01-30

## 2020-01-30 RX ORDER — BUTALBITAL, ACETAMINOPHEN AND CAFFEINE 50; 325; 40 MG/1; MG/1; MG/1
1 TABLET ORAL EVERY 4 HOURS PRN
Status: DISCONTINUED | OUTPATIENT
Start: 2020-01-30 | End: 2020-01-31 | Stop reason: HOSPADM

## 2020-01-30 RX ORDER — M-VIT,TX,IRON,MINS/CALC/FOLIC 27MG-0.4MG
1 TABLET ORAL DAILY
Status: DISCONTINUED | OUTPATIENT
Start: 2020-01-30 | End: 2020-01-31 | Stop reason: HOSPADM

## 2020-01-30 RX ORDER — GABAPENTIN 300 MG/1
300 CAPSULE ORAL 3 TIMES DAILY
Status: DISCONTINUED | OUTPATIENT
Start: 2020-01-30 | End: 2020-01-31 | Stop reason: HOSPADM

## 2020-01-30 RX ORDER — POTASSIUM CHLORIDE 7.45 MG/ML
10 INJECTION INTRAVENOUS PRN
Status: DISCONTINUED | OUTPATIENT
Start: 2020-01-30 | End: 2020-01-31 | Stop reason: HOSPADM

## 2020-01-30 RX ORDER — DIPHENHYDRAMINE HYDROCHLORIDE 50 MG/ML
25 INJECTION INTRAMUSCULAR; INTRAVENOUS ONCE
Status: COMPLETED | OUTPATIENT
Start: 2020-01-30 | End: 2020-01-30

## 2020-01-30 RX ORDER — ATORVASTATIN CALCIUM 40 MG/1
40 TABLET, FILM COATED ORAL NIGHTLY
Status: DISCONTINUED | OUTPATIENT
Start: 2020-01-30 | End: 2020-01-31 | Stop reason: HOSPADM

## 2020-01-30 RX ORDER — SODIUM CHLORIDE 9 MG/ML
INJECTION, SOLUTION INTRAVENOUS CONTINUOUS
Status: DISCONTINUED | OUTPATIENT
Start: 2020-01-30 | End: 2020-01-31 | Stop reason: HOSPADM

## 2020-01-30 RX ORDER — SODIUM CHLORIDE 0.9 % (FLUSH) 0.9 %
10 SYRINGE (ML) INJECTION EVERY 12 HOURS SCHEDULED
Status: DISCONTINUED | OUTPATIENT
Start: 2020-01-30 | End: 2020-01-31 | Stop reason: HOSPADM

## 2020-01-30 RX ORDER — LABETALOL HYDROCHLORIDE 5 MG/ML
5 INJECTION, SOLUTION INTRAVENOUS EVERY 4 HOURS PRN
Status: DISCONTINUED | OUTPATIENT
Start: 2020-01-30 | End: 2020-01-31 | Stop reason: HOSPADM

## 2020-01-30 RX ORDER — ONDANSETRON 2 MG/ML
4 INJECTION INTRAMUSCULAR; INTRAVENOUS EVERY 6 HOURS PRN
Status: DISCONTINUED | OUTPATIENT
Start: 2020-01-30 | End: 2020-01-31 | Stop reason: HOSPADM

## 2020-01-30 RX ORDER — LANOLIN ALCOHOL/MO/W.PET/CERES
325 CREAM (GRAM) TOPICAL DAILY
Status: DISCONTINUED | OUTPATIENT
Start: 2020-01-30 | End: 2020-01-31 | Stop reason: HOSPADM

## 2020-01-30 RX ORDER — POTASSIUM CHLORIDE 20 MEQ/1
40 TABLET, EXTENDED RELEASE ORAL PRN
Status: DISCONTINUED | OUTPATIENT
Start: 2020-01-30 | End: 2020-01-31 | Stop reason: HOSPADM

## 2020-01-30 RX ORDER — KETOROLAC TROMETHAMINE 15 MG/ML
15 INJECTION, SOLUTION INTRAMUSCULAR; INTRAVENOUS ONCE
Status: COMPLETED | OUTPATIENT
Start: 2020-01-30 | End: 2020-01-30

## 2020-01-30 RX ORDER — IPRATROPIUM BROMIDE AND ALBUTEROL SULFATE 2.5; .5 MG/3ML; MG/3ML
1 SOLUTION RESPIRATORY (INHALATION)
Status: DISCONTINUED | OUTPATIENT
Start: 2020-01-30 | End: 2020-01-31 | Stop reason: HOSPADM

## 2020-01-30 RX ORDER — SODIUM CHLORIDE, SODIUM LACTATE, POTASSIUM CHLORIDE, AND CALCIUM CHLORIDE .6; .31; .03; .02 G/100ML; G/100ML; G/100ML; G/100ML
1000 INJECTION, SOLUTION INTRAVENOUS ONCE
Status: COMPLETED | OUTPATIENT
Start: 2020-01-30 | End: 2020-01-30

## 2020-01-30 RX ORDER — CARVEDILOL 6.25 MG/1
6.25 TABLET ORAL 2 TIMES DAILY WITH MEALS
Status: DISCONTINUED | OUTPATIENT
Start: 2020-01-30 | End: 2020-01-31 | Stop reason: HOSPADM

## 2020-01-30 RX ORDER — ALBUTEROL SULFATE 2.5 MG/3ML
2.5 SOLUTION RESPIRATORY (INHALATION)
Status: DISCONTINUED | OUTPATIENT
Start: 2020-01-30 | End: 2020-01-31 | Stop reason: HOSPADM

## 2020-01-30 RX ADMIN — KETOROLAC TROMETHAMINE 15 MG: 15 INJECTION, SOLUTION INTRAMUSCULAR; INTRAVENOUS at 12:44

## 2020-01-30 RX ADMIN — CLOPIDOGREL 75 MG: 75 TABLET, FILM COATED ORAL at 20:26

## 2020-01-30 RX ADMIN — FERROUS SULFATE TAB EC 325 MG (65 MG FE EQUIVALENT) 325 MG: 325 (65 FE) TABLET DELAYED RESPONSE at 20:26

## 2020-01-30 RX ADMIN — DIPHENHYDRAMINE HYDROCHLORIDE 25 MG: 50 INJECTION, SOLUTION INTRAMUSCULAR; INTRAVENOUS at 12:43

## 2020-01-30 RX ADMIN — MULTIPLE VITAMINS W/ MINERALS TAB 1 TABLET: TAB at 20:26

## 2020-01-30 RX ADMIN — MOMETASONE FUROATE AND FORMOTEROL FUMARATE DIHYDRATE 2 PUFF: 200; 5 AEROSOL RESPIRATORY (INHALATION) at 19:55

## 2020-01-30 RX ADMIN — CARVEDILOL 6.25 MG: 6.25 TABLET, FILM COATED ORAL at 20:26

## 2020-01-30 RX ADMIN — DESMOPRESSIN ACETATE 40 MG: 0.2 TABLET ORAL at 20:26

## 2020-01-30 RX ADMIN — PROCHLORPERAZINE EDISYLATE 10 MG: 5 INJECTION INTRAMUSCULAR; INTRAVENOUS at 12:43

## 2020-01-30 RX ADMIN — ENOXAPARIN SODIUM 40 MG: 40 INJECTION SUBCUTANEOUS at 20:27

## 2020-01-30 RX ADMIN — BUTALBITAL, ACETAMINOPHEN AND CAFFEINE 1 TABLET: 50; 325; 40 TABLET ORAL at 20:26

## 2020-01-30 RX ADMIN — Medication 10 ML: at 20:27

## 2020-01-30 RX ADMIN — SODIUM CHLORIDE, POTASSIUM CHLORIDE, SODIUM LACTATE AND CALCIUM CHLORIDE 1000 ML: 600; 310; 30; 20 INJECTION, SOLUTION INTRAVENOUS at 12:43

## 2020-01-30 RX ADMIN — Medication 100 MG: at 20:26

## 2020-01-30 RX ADMIN — IPRATROPIUM BROMIDE AND ALBUTEROL SULFATE 1 AMPULE: .5; 3 SOLUTION RESPIRATORY (INHALATION) at 19:49

## 2020-01-30 RX ADMIN — GABAPENTIN 300 MG: 300 CAPSULE ORAL at 20:26

## 2020-01-30 RX ADMIN — FOLIC ACID 1 MG: 1 TABLET ORAL at 20:26

## 2020-01-30 ASSESSMENT — ENCOUNTER SYMPTOMS
DIARRHEA: 0
SORE THROAT: 0
SHORTNESS OF BREATH: 0
COUGH: 1
ABDOMINAL PAIN: 0
NAUSEA: 0
VOMITING: 0

## 2020-01-30 ASSESSMENT — PAIN - FUNCTIONAL ASSESSMENT
PAIN_FUNCTIONAL_ASSESSMENT: 0-10
PAIN_FUNCTIONAL_ASSESSMENT: 0-10

## 2020-01-30 ASSESSMENT — PAIN DESCRIPTION - DESCRIPTORS: DESCRIPTORS: THROBBING

## 2020-01-30 ASSESSMENT — PAIN SCALES - GENERAL
PAINLEVEL_OUTOF10: 8
PAINLEVEL_OUTOF10: 9

## 2020-01-30 NOTE — H&P
[facility]   [address] [place] [address]    HISTORY AND PHYSICAL EXAMINATION            Date:   1/30/2020  Patient name:  Otf Kevin  Date of admission:  1/30/2020 11:35 AM  MRN:   [id number]  Account:  [de-identified]  YOB: 1955  PCP:    No primary care provider on file. Room:   01/01  Code Status:    Prior    Chief Complaint:     Chief Complaint   Patient presents with    Headache     pt reports headache x 5 days;  frontal in location;  oriented to place, time, and person. History Obtained From:     patient, electronic medical record    History of Present Illness: The patient is a 64 y.o. female who was a bounce back to the hospital left AMA after the last admission). She initially presented with approximately 4-week history of worsening memory, shaking hands, aphasia and right-sided drift.  She was a poor historian. Kennedy Shin also complained of a productive cough during this time.  She denied any headaches, dizziness, numbness or weakness, loss of consciousness.     In April 2019, she had an episode of a facial with left-sided facial droop.  CTA head and neck, CT head negative for acute infarct.  She left [place] at that time without complete work-up to rule out TIA versus stroke versus seizure versus complicated migraine.  She was started on Plavix and Lipitor.     History of GI bleed in 2018.  Found to have linear ulcerations adjacent to the IC valve, diverticulosis, small internal hemorrhoids.  Hep C positive, alcoholic cirrhosis. During this admission, she was more somnolent, mumbled speech, confused. Not really interactive. No family was at her bedside.      Past Medical History:     Past Medical History:   Diagnosis Date    Appendicitis 3/27/2017    CHF (congestive heart failure) (HCC)     Chronic respiratory failure with hypoxia and hypercapnia (HCC)     Chronic rhinitis     Cigarette smoker     COPD (chronic obstructive pulmonary disease) ([address] 2018    Depression     Dysphagia     Essential hypertension 2018    GERD (gastroesophageal reflux disease)     Heart failure, diastolic, with acute decompensation [facility] 2018    Hepatitis C, chronic (HCC)     History of smoking at least 1 pack per day for at least 30 years     Hyperlipidemia     Hypertension     Migraine     Moderate COPD (chronic obstructive pulmonary disease) (HCC)     Multiple transfusions     Osteoarthritis     hands    Pneumonia     Scoliosis     Substance abuse ([facility]     ivda and cocaine abuse in past        Past Surgical History:     Past Surgical History:   Procedure Laterality Date    APPENDECTOMY  2017     SECTION      GASTRIC BYPASS SURGERY      HIP SURGERY Right 2019    HIP TOTAL ARTHROPLASTY ANTERIOR APPROACH - [facility],  C-ARM, (Right )    LAPAROSCOPIC APPENDECTOMY N/A 3/27/2017    APPENDECTOMY LAPAROSCOPIC performed by Rosi Santillan MD at [address] [place] [address]      hgsil    MN COLONOSCOPY W/BIOPSY SINGLE/MULTIPLE N/A 11/10/2018    COLONOSCOPY WITH BIOPSY performed by Doris Taylor MD at [facility]    MN EGD TRANSORAL BIOPSY SINGLE/MULTIPLE N/A 2018    EGD ESOPHAGOGASTRODUODENOSCOPY performed by Judi Sanchez MD at [address] [place] [address] Right 2019    HIP TOTAL ARTHROPLASTY ANTERIOR APPROACH - [facility],  C-ARM, performed by Rhona Carr DO at [address]      oesophagitis, candidiasis    UPPER GASTROINTESTINAL ENDOSCOPY  2018    UPPER GASTROINTESTINAL ENDOSCOPY N/A 2018    EGD CONTROL HEMORRHAGE performed by Judi Sanchez MD at [place] [address]        Medications Prior to Admission:     Prior to Admission medications    Medication Sig Start Date End Date Taking?  Authorizing Provider   albuterol sulfate HFA (PROVENTIL HFA) 108 (90 Base) MCG/ACT inhaler Inhale 1-2 puffs into the lungs once for 1 dose 20  Lauro Marin MD   ondansetron Take 1 tablet by mouth daily 18   Ti Jenkins MD        Allergies:     Aspirin and Keflet [cephalexin]    Social History:     Tobacco:    reports that she quit smoking about 14 months ago. Her smoking use included cigarettes. She started smoking about 50 years ago. She has a 40.00 pack-year smoking history. She has never used smokeless tobacco.  Alcohol:      reports no history of alcohol use. Drug Use:  reports no history of drug use. Family History:     Family History   Problem Relation Age of Onset    Breast Cancer Mother     Heart Disease Father        Review of Systems:     ROS:  CONSTITUTIONAL: negative for fatigue and malaise   EYES: negative for double vision and photophobia    HEENT: negative for tinnitus and sore throat   RESPIRATORY:  Positive for cough, shortness of breath   CARDIOVASCULAR: negative for chest pain, palpitations   GASTROINTESTINAL: negative for nausea, vomiting   GENITOURINARY: negative for incontinence   MUSCULOSKELETAL: negative for neck or back pain   NEUROLOGICAL: negative for seizures   PSYCHIATRIC: negative for fatigue       Physical Exam:   BP (!) 183/104   Pulse 79   Temp 97.2 °F (36.2 °C) (Oral)   Resp 18   Wt 108 lb (49 kg)   LMP 2015   SpO2 96%   BMI 21.81 kg/m²   Temp (24hrs), Av.2 °F (36.2 °C), Min:97.2 °F (36.2 °C), Max:97.2 °F (36.2 °C)    No results for input(s): POCGLU in the last 72 hours. No intake or output data in the 24 hours ending 20 1356    CONSTITUTIONAL:  Well developed, well nourished, alert and oriented x 3, in no acute distress. GCS 15, nontoxic. No dysarthria, No aphasia. EOMI.     HEAD:  normocephalic, atraumatic    EYES:  PERRLA, EOMI.   ENT:  moist mucous membranes   NECK:  supple, symmetric, no midline tenderness to palpation    BACK:  without midline tenderness, step-offs or deformities    LUNGS:  Equal air entry bilaterally   CARDIOVASCULAR:  normal s1 / s2   ABDOMEN:  Soft, no rigidity         NEUROLOGIC EXAMINATION  NEUROLOGIC:  Mental Status:  A & O x3,awake             Cranial Nerves:    cranial nerves II-XII are grossly intact     Motor Exam:    Drift:  absent  Tone:  normal     Motor exam is 4 out of 5 all extremities with the exception of bilateral lower extremities     Sensory:    Touch:    Right Upper Extremity:  normal  Left Upper Extremity:  normal  Right Lower Extremity:  normal  Left Lower Extremity:  normal     Deep Tendon Reflexes:    Right Bicep:  2+  Left Bicep:  2+  Right Knee:  2+  Left Knee:  2+     Plantar Response:  Right:  downgoing  Left:  downgoing     Clonus:  absent  Fatima's:  N/A     Coordination/Dysmetria:  Heel to Shin:  Right:  abnormal -cannot perform  Left:  normal  Finger to Nose:   Right:  abnormal -difficulty hitting the target  Left:  abnormal -difficulty hitting the target  Dysdiadochokinesia:  N/A     Gait: Not performed       Investigations:      Laboratory Testing:  Recent Results (from the past 24 hour(s))   CBC Auto Differential    Collection Time: 01/30/20 12:37 PM   Result Value Ref Range    WBC 5.6 3.5 - 11.3 k/uL    RBC 4.64 3.95 - 5.11 m/uL    Hemoglobin 12.2 11.9 - 15.1 g/dL    Hematocrit 42.7 36.3 - 47.1 %    MCV 92.0 82.6 - 102.9 fL    MCH 26.3 25.2 - 33.5 pg    MCHC 28.6 28.4 - 34.8 g/dL    RDW 18.6 (H) 11.8 - 14.4 %    Platelets 731 (H) 147 - 453 k/uL    MPV 9.2 8.1 - 13.5 fL    NRBC Automated 0.0 0.0 per 100 WBC    Differential Type NOT REPORTED     Seg Neutrophils 54 36 - 65 %    Lymphocytes 29 24 - 43 %    Monocytes 14 (H) 3 - 12 %    Eosinophils % 1 1 - 4 %    Basophils 1 0 - 2 %    Immature Granulocytes 1 (H) 0 %    Segs Absolute 3.06 1.50 - 8.10 k/uL    Absolute Lymph # 1.62 1.10 - 3.70 k/uL    Absolute Mono # 0.78 0.10 - 1.20 k/uL    Absolute Eos # 0.08 0.00 - 0.44 k/uL    Basophils Absolute 0.03 0.00 - 0.20 k/uL    Absolute Immature Granulocyte 0.03 0.00 - 0.30 k/uL    WBC Morphology NOT REPORTED     RBC Morphology ANISOCYTOSIS PRESENT     Platelet Does not tolerate aspirin  2. Speech and swallow evaluation  3. Will benefit from neuro cognitive evaluation outpatient  4. PT OT  5. Discharge planning with case management  6.  Consult to medicine for medical management       Reza Bolivar MD  PGY-2, Internal medicine resident  [place], [place]  1/30/2020 1:58 PM

## 2020-01-30 NOTE — ED PROVIDER NOTES
[address] [facility]  Emergency Department Encounter  EmergencyMedicine Resident     Pt Name:Emi Hernández  MRN: [id number]  Birthdate 1955  Date of evaluation: 1/30/20  PCP:  No primary care provider on file. CHIEF COMPLAINT       Chief Complaint   Patient presents with    Headache     pt reports headache x 5 days;  frontal in location;  oriented to place, time, and person. HISTORY OF PRESENT ILLNESS  (Location/Symptom, Timing/Onset, Context/Setting, Quality, Duration, Modifying Factors, Severity.)      Emi Daniels is a 75 y.o. female who presents complaining of her typical migraine headache for the past 4 days. Patient has multiple comorbidities as below and is a very poor historian. She left the hospital AMA 3 days ago after an admission for possible stroke. Originally presented to the emergency department with memory loss, aphasia, right-sided drift, and shakiness for the past 4 weeks; CT head was unremarkable, MRI brain showed chronic microvascular disease with a focus of restricted diffusion in the left posterior frontal lobe consistent with acute ischemia. Patient also had nonproductive cough for 4 weeks as well as dyspnea; internal medicine was consulted and planned for prednisone, Levaquin, breathing treatments. However, patient left AMA the next day as her sister was sick. She is a very poor historian and unable to adequately describe to me the timeline of events since then. She repeatedly states her only reason for coming today is her migraine and dissatisfaction with her Fioricet prescription which is a different formulary then her previous prescription. Patient reports a constant, throbbing frontal headache consistent with her previous migraine headaches. She denies any falls. She does not recall what she was doing when the headache began, but states it has steadily worsened over several days. She denies any vision changes or any new numbness, tingling, or focal weakness.   She denies any other new symptoms whatsoever. Multiple comorbidities as below. Patient lives at home alone and reports she has been having difficulty caring for herself. PAST MEDICAL / SURGICAL / SOCIAL / FAMILY HISTORY      has a past medical history of Appendicitis, CHF (congestive heart failure) (HCC), Chronic respiratory failure with hypoxia and hypercapnia (HCC), Chronic rhinitis, Cigarette smoker, COPD (chronic obstructive pulmonary disease) ([address] Depression, Dysphagia, Essential hypertension, GERD (gastroesophageal reflux disease), Heart failure, diastolic, with acute decompensation ([address] Hepatitis C, chronic ([address] History of smoking at least 1 pack per day for at least 30 years, Hyperlipidemia, Hypertension, Migraine, Moderate COPD (chronic obstructive pulmonary disease) ([address] Multiple transfusions, Osteoarthritis, Pneumonia, Scoliosis, and Substance abuse ([address] has a past surgical history that includes  section; LEEP (); Upper gastrointestinal endoscopy (); Gastric bypass surgery; Appendectomy (2017); laparoscopic appendectomy (N/A, 3/27/2017); Upper gastrointestinal endoscopy (2018); Upper gastrointestinal endoscopy (N/A, 2018); pr colonoscopy w/biopsy single/multiple (N/A, 11/10/2018); pr egd transoral biopsy single/multiple (N/A, 2018); hip surgery (Right, 2019); and Total hip arthroplasty (Right, 2019).     Social History     Socioeconomic History    Marital status: Single     Spouse name: Not on file    Number of children: Not on file    Years of education: Not on file    Highest education level: Not on file   Occupational History    Not on file   Social Needs    Financial resource strain: Not on file    Food insecurity:     Worry: Not on file     Inability: Not on file    Transportation needs:     Medical: Not on file     Non-medical: Not on file   Tobacco Use    Smoking status: Former Smoker     Packs/day: 1.00     Years: 40.00 XR CHEST STANDARD (2 VW)    CT HEAD WO CONTRAST    Vascular carotid duplex bilateral    CTA HEAD NECK W CONTRAST    CBC Auto Differential    COMPREHENSIVE METABOLIC PANEL    Basic Metabolic Panel w/ Reflex to MG    CBC auto differential    Sedimentation Rate    DIET GENERAL;    Vital signs    Telemetry monitoring    NIHSS    Tobacco cessation education    Swallow assessment by nursing before diet and oral medications started.     Stroke education    Place intermittent pneumatic compression device    Up with assistance    Full Code    Inpatient consult to Neurology    Consult to Hospitalist    Inpatient consult to Case Management    Inpatient consult to Hospitalist    OT eval and treat    PT evaluation and treat    Initiate Oxygen Therapy Protocol    MDI Treatment    HHN Treatment    Initiate RT Protocol    Respiratory care evaluation only    HHN Treatment    Speech Language Pathology (SLP) eval and treat    POC Glucose Fingerstick    PATIENT STATUS (FROM ED OR OR/PROCEDURAL) Inpatient       MEDICATIONS ORDERED:  Orders Placed This Encounter   Medications    lactated ringers bolus    prochlorperazine (COMPAZINE) injection 10 mg    diphenhydrAMINE (BENADRYL) injection 25 mg    ketorolac (TORADOL) injection 15 mg    carvedilol (COREG) tablet 6.25 mg    ferrous sulfate EC tablet 035 mg    folic acid (FOLVITE) tablet 1 mg    gabapentin (NEURONTIN) capsule 300 mg    therapeutic multivitamin-minerals 1 tablet    vitamin B-1 (THIAMINE) tablet 100 mg    magnesium hydroxide (MILK OF MAGNESIA) 400 MG/5ML suspension 30 mL    ondansetron (ZOFRAN) injection 4 mg    atorvastatin (LIPITOR) tablet 40 mg    enoxaparin (LOVENOX) injection 40 mg    0.9 % sodium chloride infusion    sodium chloride flush 0.9 % injection 10 mL    sodium chloride flush 0.9 % injection 10 mL    OR Linked Order Group     potassium chloride (KLOR-CON M) extended release tablet 40 mEq     potassium bicarb-citric acid (EFFER-K) effervescent tablet 40 mEq     potassium chloride 10 mEq/100 mL IVPB (Peripheral Line)    magnesium sulfate 1 g in dextrose 5% 100 mL IVPB    labetalol (NORMODYNE;TRANDATE) injection syringe 10 mg    mometasone-formoterol (DULERA) 200-5 MCG/ACT inhaler 2 puff    ipratropium-albuterol (DUONEB) nebulizer solution 1 ampule    albuterol (PROVENTIL) nebulizer solution 2.5 mg    butalbital-acetaminophen-caffeine (FIORICET, ESGIC) per tablet 1 tablet    labetalol (NORMODYNE;TRANDATE) injection 5 mg    clopidogrel (PLAVIX) tablet 75 mg       DDX: Stroke, hemorrhagic conversion, migraine, complex migraine, electrolyte derangement, glucose derangement, decreased ADLs    DIAGNOSTIC RESULTS / EMERGENCY DEPARTMENT COURSE / MDM     LABS:  Results for orders placed or performed during the hospital encounter of 01/30/20   CBC Auto Differential   Result Value Ref Range    WBC 5.6 3.5 - 11.3 k/uL    RBC 4.64 3.95 - 5.11 m/uL    Hemoglobin 12.2 11.9 - 15.1 g/dL    Hematocrit 42.7 36.3 - 47.1 %    MCV 92.0 82.6 - 102.9 fL    MCH 26.3 25.2 - 33.5 pg    MCHC 28.6 28.4 - 34.8 g/dL    RDW 18.6 (H) 11.8 - 14.4 %    Platelets 822 (H) 497 - 453 k/uL    MPV 9.2 8.1 - 13.5 fL    NRBC Automated 0.0 0.0 per 100 WBC    Differential Type NOT REPORTED     Seg Neutrophils 54 36 - 65 %    Lymphocytes 29 24 - 43 %    Monocytes 14 (H) 3 - 12 %    Eosinophils % 1 1 - 4 %    Basophils 1 0 - 2 %    Immature Granulocytes 1 (H) 0 %    Segs Absolute 3.06 1.50 - 8.10 k/uL    Absolute Lymph # 1.62 1.10 - 3.70 k/uL    Absolute Mono # 0.78 0.10 - 1.20 k/uL    Absolute Eos # 0.08 0.00 - 0.44 k/uL    Basophils Absolute 0.03 0.00 - 0.20 k/uL    Absolute Immature Granulocyte 0.03 0.00 - 0.30 k/uL    WBC Morphology NOT REPORTED     RBC Morphology ANISOCYTOSIS PRESENT     Platelet Estimate NOT REPORTED    COMPREHENSIVE METABOLIC PANEL   Result Value Ref Range    Glucose 81 70 - 99 mg/dL    BUN 9 8 - 23 mg/dL    CREATININE 0.57 0.50 - 0.90 mg/dL    Bun/Cre Ratio NOT REPORTED 9 - 20    Calcium 9.2 8.6 - 10.4 mg/dL    Sodium 138 135 - 144 mmol/L    Potassium 3.6 (L) 3.7 - 5.3 mmol/L    Chloride 104 98 - 107 mmol/L    CO2 23 20 - 31 mmol/L    Anion Gap 11 9 - 17 mmol/L    Alkaline Phosphatase 121 (H) 35 - 104 U/L    ALT 18 5 - 33 U/L    AST 36 (H) <32 U/L    Total Bilirubin 0.47 0.3 - 1.2 mg/dL    Total Protein 7.7 6.4 - 8.3 g/dL    Alb 3.8 3.5 - 5.2 g/dL    Albumin/Globulin Ratio 1.0 1.0 - 2.5    GFR Non-African American >60 >60 mL/min    GFR African American >60 >60 mL/min    GFR Comment          GFR Staging NOT REPORTED    POC Glucose Fingerstick   Result Value Ref Range    POC Glucose 65 65 - 105 mg/dL       IMPRESSION: 75year old patient presents with headache after leaving AMA 3 days ago during an admission for stroke. Patient is afebrile, hemodynamically stable, and in no acute distress. Patient is awake, cooperative, however she is a very poor historian and frequently responds with \"I do not know. \"  She is oriented to person and place only; not oriented to time and only partially oriented to situation. She does not seem to understand the seriousness of her recent admission to the hospital.  She repeatedly states that she wants a different prescription for Fioricet because she does not like how big the tablets are. Normal respiratory effort, lungs clear bilaterally, heart sounds normal, abdomen benign. No focal deficits on neurological examination. She does have a fine tremor. Plan for basic labs, chest x-ray given ongoing cough and slight tachypnea on exam, CT head. Symptomatic support with fluids, migraine cocktail. Neurology consultation, as they were admitting team 3 days ago.     RADIOLOGY:  She reports    EKG  None    All EKG's are interpreted by the Emergency Department Physician who either signs or Co-signs this chart in the absence of a cardiologist.    EMERGENCY DEPARTMENT COURSE:    Laboratory investigations are

## 2020-01-30 NOTE — ED NOTES
Pt to ED treatment area with reports of frontal headache;  Pt reports memory loss. Pt is able to answer basic questions appropriately. Oriented to person, place, time, situation.        Joyce Dyson RN  01/30/20 [id number]

## 2020-01-31 ENCOUNTER — APPOINTMENT (OUTPATIENT)
Dept: CT IMAGING | Age: 65
End: 2020-01-31
Payer: MEDICARE

## 2020-01-31 VITALS
TEMPERATURE: 97.6 F | SYSTOLIC BLOOD PRESSURE: 158 MMHG | DIASTOLIC BLOOD PRESSURE: 94 MMHG | BODY MASS INDEX: 21.81 KG/M2 | RESPIRATION RATE: 23 BRPM | HEART RATE: 86 BPM | WEIGHT: 108 LBS | OXYGEN SATURATION: 91 %

## 2020-01-31 PROBLEM — R79.89 ELEVATED LACTIC ACID LEVEL: Status: RESOLVED | Noted: 2019-11-13 | Resolved: 2020-01-31

## 2020-01-31 PROBLEM — D62 POSTOPERATIVE ANEMIA DUE TO ACUTE BLOOD LOSS: Status: RESOLVED | Noted: 2019-08-14 | Resolved: 2020-01-31

## 2020-01-31 PROBLEM — K85.90 ACUTE PANCREATITIS WITHOUT INFECTION OR NECROSIS: Status: RESOLVED | Noted: 2019-09-21 | Resolved: 2020-01-31

## 2020-01-31 PROBLEM — R07.9 CHEST PAIN: Status: RESOLVED | Noted: 2019-05-12 | Resolved: 2020-01-31

## 2020-01-31 PROBLEM — R55 SYNCOPE AND COLLAPSE: Status: RESOLVED | Noted: 2019-07-20 | Resolved: 2020-01-31

## 2020-01-31 PROBLEM — N30.00 ACUTE CYSTITIS WITHOUT HEMATURIA: Status: RESOLVED | Noted: 2019-08-19 | Resolved: 2020-01-31

## 2020-01-31 PROBLEM — R79.89 ELEVATED BRAIN NATRIURETIC PEPTIDE (BNP) LEVEL: Status: RESOLVED | Noted: 2019-11-13 | Resolved: 2020-01-31

## 2020-01-31 PROBLEM — R65.20 SEVERE SEPSIS (HCC): Status: RESOLVED | Noted: 2019-01-07 | Resolved: 2020-01-31

## 2020-01-31 PROBLEM — R10.9 ABDOMINAL PAIN: Status: RESOLVED | Noted: 2019-06-19 | Resolved: 2020-01-31

## 2020-01-31 PROBLEM — R06.09 DYSPNEA ON EXERTION: Status: RESOLVED | Noted: 2020-01-27 | Resolved: 2020-01-31

## 2020-01-31 PROBLEM — A41.9 SEVERE SEPSIS (HCC): Status: RESOLVED | Noted: 2019-01-07 | Resolved: 2020-01-31

## 2020-01-31 PROBLEM — J96.21 ACUTE ON CHRONIC RESPIRATORY FAILURE WITH HYPOXEMIA (HCC): Status: RESOLVED | Noted: 2019-11-13 | Resolved: 2020-01-31

## 2020-01-31 LAB
ABSOLUTE EOS #: 0.09 K/UL (ref 0–0.44)
ABSOLUTE IMMATURE GRANULOCYTE: 0.03 K/UL (ref 0–0.3)
ABSOLUTE LYMPH #: 1.73 K/UL (ref 1.1–3.7)
ABSOLUTE MONO #: 0.98 K/UL (ref 0.1–1.2)
ANION GAP SERPL CALCULATED.3IONS-SCNC: 15 MMOL/L (ref 9–17)
BASOPHILS # BLD: 0 % (ref 0–2)
BASOPHILS ABSOLUTE: <0.03 K/UL (ref 0–0.2)
BUN BLDV-MCNC: 11 MG/DL (ref 8–23)
BUN/CREAT BLD: ABNORMAL (ref 9–20)
CALCIUM SERPL-MCNC: 8.7 MG/DL (ref 8.6–10.4)
CHLORIDE BLD-SCNC: 103 MMOL/L (ref 98–107)
CO2: 19 MMOL/L (ref 20–31)
CREAT SERPL-MCNC: 0.47 MG/DL (ref 0.5–0.9)
DIFFERENTIAL TYPE: ABNORMAL
EOSINOPHILS RELATIVE PERCENT: 1 % (ref 1–4)
GFR AFRICAN AMERICAN: >60 ML/MIN
GFR NON-AFRICAN AMERICAN: >60 ML/MIN
GFR SERPL CREATININE-BSD FRML MDRD: ABNORMAL ML/MIN/{1.73_M2}
GFR SERPL CREATININE-BSD FRML MDRD: ABNORMAL ML/MIN/{1.73_M2}
GLUCOSE BLD-MCNC: 76 MG/DL (ref 70–99)
HCT VFR BLD CALC: 38.9 % (ref 36.3–47.1)
HEMOGLOBIN: 11.1 G/DL (ref 11.9–15.1)
IMMATURE GRANULOCYTES: 1 %
LYMPHOCYTES # BLD: 28 % (ref 24–43)
MCH RBC QN AUTO: 26.2 PG (ref 25.2–33.5)
MCHC RBC AUTO-ENTMCNC: 28.5 G/DL (ref 28.4–34.8)
MCV RBC AUTO: 91.7 FL (ref 82.6–102.9)
MONOCYTES # BLD: 16 % (ref 3–12)
NRBC AUTOMATED: 0 PER 100 WBC
PDW BLD-RTO: 18.6 % (ref 11.8–14.4)
PLATELET # BLD: 485 K/UL (ref 138–453)
PLATELET ESTIMATE: ABNORMAL
PMV BLD AUTO: 9.1 FL (ref 8.1–13.5)
POTASSIUM SERPL-SCNC: 3.9 MMOL/L (ref 3.7–5.3)
RBC # BLD: 4.24 M/UL (ref 3.95–5.11)
RBC # BLD: ABNORMAL 10*6/UL
SEDIMENTATION RATE, ERYTHROCYTE: 18 MM (ref 0–20)
SEG NEUTROPHILS: 54 % (ref 36–65)
SEGMENTED NEUTROPHILS ABSOLUTE COUNT: 3.36 K/UL (ref 1.5–8.1)
SODIUM BLD-SCNC: 137 MMOL/L (ref 135–144)
WBC # BLD: 6.2 K/UL (ref 3.5–11.3)
WBC # BLD: ABNORMAL 10*3/UL

## 2020-01-31 PROCEDURE — 85025 COMPLETE CBC W/AUTO DIFF WBC: CPT

## 2020-01-31 PROCEDURE — 76937 US GUIDE VASCULAR ACCESS: CPT

## 2020-01-31 PROCEDURE — 97166 OT EVAL MOD COMPLEX 45 MIN: CPT

## 2020-01-31 PROCEDURE — 97535 SELF CARE MNGMENT TRAINING: CPT | Performed by: OCCUPATIONAL THERAPIST

## 2020-01-31 PROCEDURE — G0378 HOSPITAL OBSERVATION PER HR: HCPCS

## 2020-01-31 PROCEDURE — 2580000003 HC RX 258: Performed by: STUDENT IN AN ORGANIZED HEALTH CARE EDUCATION/TRAINING PROGRAM

## 2020-01-31 PROCEDURE — 80048 BASIC METABOLIC PNL TOTAL CA: CPT

## 2020-01-31 PROCEDURE — 6370000000 HC RX 637 (ALT 250 FOR IP): Performed by: STUDENT IN AN ORGANIZED HEALTH CARE EDUCATION/TRAINING PROGRAM

## 2020-01-31 PROCEDURE — 99254 IP/OBS CNSLTJ NEW/EST MOD 60: CPT | Performed by: INTERNAL MEDICINE

## 2020-01-31 PROCEDURE — 36415 COLL VENOUS BLD VENIPUNCTURE: CPT

## 2020-01-31 PROCEDURE — 96372 THER/PROPH/DIAG INJ SC/IM: CPT

## 2020-01-31 PROCEDURE — 97162 PT EVAL MOD COMPLEX 30 MIN: CPT

## 2020-01-31 PROCEDURE — 6360000002 HC RX W HCPCS: Performed by: STUDENT IN AN ORGANIZED HEALTH CARE EDUCATION/TRAINING PROGRAM

## 2020-01-31 PROCEDURE — 99232 SBSQ HOSP IP/OBS MODERATE 35: CPT | Performed by: PSYCHIATRY & NEUROLOGY

## 2020-01-31 PROCEDURE — 94640 AIRWAY INHALATION TREATMENT: CPT

## 2020-01-31 PROCEDURE — 97166 OT EVAL MOD COMPLEX 45 MIN: CPT | Performed by: OCCUPATIONAL THERAPIST

## 2020-01-31 PROCEDURE — 6360000004 HC RX CONTRAST MEDICATION: Performed by: STUDENT IN AN ORGANIZED HEALTH CARE EDUCATION/TRAINING PROGRAM

## 2020-01-31 PROCEDURE — 70498 CT ANGIOGRAPHY NECK: CPT

## 2020-01-31 PROCEDURE — 97530 THERAPEUTIC ACTIVITIES: CPT

## 2020-01-31 RX ORDER — BUTALBITAL, ACETAMINOPHEN AND CAFFEINE 50; 325; 40 MG/1; MG/1; MG/1
1 TABLET ORAL EVERY 4 HOURS PRN
Qty: 180 TABLET | Refills: 3 | Status: SHIPPED | OUTPATIENT
Start: 2020-01-31 | End: 2021-03-02 | Stop reason: SDUPTHER

## 2020-01-31 RX ORDER — AMITRIPTYLINE HYDROCHLORIDE 10 MG/1
10 TABLET, FILM COATED ORAL NIGHTLY
Qty: 30 TABLET | Refills: 3 | Status: ON HOLD | OUTPATIENT
Start: 2020-01-31 | End: 2021-04-06 | Stop reason: SDUPTHER

## 2020-01-31 RX ORDER — ATORVASTATIN CALCIUM 40 MG/1
40 TABLET, FILM COATED ORAL NIGHTLY
Qty: 30 TABLET | Refills: 3 | Status: ON HOLD | OUTPATIENT
Start: 2020-01-31 | End: 2021-04-06 | Stop reason: SDUPTHER

## 2020-01-31 RX ORDER — CLOPIDOGREL BISULFATE 75 MG/1
75 TABLET ORAL DAILY
Qty: 30 TABLET | Refills: 3 | Status: ON HOLD | OUTPATIENT
Start: 2020-02-01 | End: 2021-04-06 | Stop reason: SDUPTHER

## 2020-01-31 RX ORDER — AMITRIPTYLINE HYDROCHLORIDE 10 MG/1
10 TABLET, FILM COATED ORAL NIGHTLY
Status: DISCONTINUED | OUTPATIENT
Start: 2020-01-31 | End: 2020-01-31 | Stop reason: HOSPADM

## 2020-01-31 RX ADMIN — Medication 10 ML: at 08:55

## 2020-01-31 RX ADMIN — CARVEDILOL 6.25 MG: 6.25 TABLET, FILM COATED ORAL at 08:55

## 2020-01-31 RX ADMIN — GABAPENTIN 300 MG: 300 CAPSULE ORAL at 08:55

## 2020-01-31 RX ADMIN — CLOPIDOGREL 75 MG: 75 TABLET, FILM COATED ORAL at 08:55

## 2020-01-31 RX ADMIN — FERROUS SULFATE TAB EC 325 MG (65 MG FE EQUIVALENT) 325 MG: 325 (65 FE) TABLET DELAYED RESPONSE at 08:55

## 2020-01-31 RX ADMIN — MULTIPLE VITAMINS W/ MINERALS TAB 1 TABLET: TAB at 08:55

## 2020-01-31 RX ADMIN — IPRATROPIUM BROMIDE AND ALBUTEROL SULFATE 1 AMPULE: .5; 3 SOLUTION RESPIRATORY (INHALATION) at 07:52

## 2020-01-31 RX ADMIN — FOLIC ACID 1 MG: 1 TABLET ORAL at 08:55

## 2020-01-31 RX ADMIN — MOMETASONE FUROATE AND FORMOTEROL FUMARATE DIHYDRATE 2 PUFF: 200; 5 AEROSOL RESPIRATORY (INHALATION) at 07:53

## 2020-01-31 RX ADMIN — GABAPENTIN 300 MG: 300 CAPSULE ORAL at 12:33

## 2020-01-31 RX ADMIN — Medication 100 MG: at 08:55

## 2020-01-31 RX ADMIN — ENOXAPARIN SODIUM 40 MG: 40 INJECTION SUBCUTANEOUS at 08:55

## 2020-01-31 RX ADMIN — BUTALBITAL, ACETAMINOPHEN AND CAFFEINE 1 TABLET: 50; 325; 40 TABLET ORAL at 12:33

## 2020-01-31 RX ADMIN — IPRATROPIUM BROMIDE AND ALBUTEROL SULFATE 1 AMPULE: .5; 3 SOLUTION RESPIRATORY (INHALATION) at 11:31

## 2020-01-31 RX ADMIN — IOHEXOL 90 ML: 350 INJECTION, SOLUTION INTRAVENOUS at 11:09

## 2020-01-31 RX ADMIN — BUTALBITAL, ACETAMINOPHEN AND CAFFEINE 1 TABLET: 50; 325; 40 TABLET ORAL at 04:47

## 2020-01-31 ASSESSMENT — PAIN SCALES - GENERAL
PAINLEVEL_OUTOF10: 7

## 2020-01-31 ASSESSMENT — PAIN DESCRIPTION - DESCRIPTORS
DESCRIPTORS: HEADACHE
DESCRIPTORS: THROBBING

## 2020-01-31 ASSESSMENT — PAIN DESCRIPTION - LOCATION
LOCATION: BACK
LOCATION: BACK
LOCATION: HEAD

## 2020-01-31 ASSESSMENT — PAIN - FUNCTIONAL ASSESSMENT
PAIN_FUNCTIONAL_ASSESSMENT: 0-10
PAIN_FUNCTIONAL_ASSESSMENT: 0-10

## 2020-01-31 ASSESSMENT — PAIN DESCRIPTION - PAIN TYPE
TYPE: ACUTE PAIN

## 2020-01-31 ASSESSMENT — PAIN DESCRIPTION - FREQUENCY: FREQUENCY: INTERMITTENT

## 2020-01-31 NOTE — PROGRESS NOTES
Active Problem Acute left basal ganglia infarction  . Chronic daily headaches . COPD exacerbation . Generalized weakness. The condition is she is walking 25 feet with rolling walker in PT independantly . Blood pressure ar running in 130-160 range. CTA head and neck no large vessel occlusion . She is alert and oriented with nonfocal exam with full motor strength. She reports mild frontal headache. ESR 18. She is on plavix 75 mg po qd with aspirin allergy also on  lipitor 40 mg po qd . Her breathing is fine with no dyspnea . She was admitted to [address] [place] [address] on January 26 with shortness of breath with increasing dyspnea and cough  over 4 weeks . There was also generalized weakness ambulating with walker having no falls  along with postural tremor and trouble with memory and forgetfulness . She has chronic daily headaches over frontal head of mild to moderate intensity . She was seen by pulmonary felt to have COPD exacerbation . Head CT with bilateral chronic periventricular small vessel ischemia . She was seen by neurology ordering MRI of Head showing acute left posterior basal ganglia infarction along with bilateral chronic periventricular small vessel ischemia  . She has history of hypertension  Patient had her sister who was ill going AMA on January 27 . She presents back to Hospital with ongoing disruptive bilateral pressure frontal headache grade 9 over 10 . She is allergic to aspirin . /104. She had episode of left face and and arm weakness in 2019  . Cardiac 2 D echo small hyperdynamic LV , EF 69 % . Moderate LVH . Basal septal hypertrophy . Grade 1 diastolic dysfunction .  Mild TR , November 2019 Toxicology screen negative , TSH normal . Cholesterol 182, LDL 81 ,  , Hga1c 5.3        Past Medical History:   Diagnosis Date    Appendicitis 3/27/2017    CHF (congestive heart failure) (HCC)     Chronic respiratory failure with hypoxia and hypercapnia (HCC)     Chronic rhinitis     Cigarette smoker     COPD [facility]chronic obstructive [facility] disease) ([facility] 2018    Depression     Dysphagia     Essential hypertension 2018    GERD (gastroesophageal reflux disease)     Heart failure, diastolic, with acute decompensation ([facility] 2018    Hepatitis C, chronic (HCC)     History of smoking at least 1 pack per day for at least 30 years     Hyperlipidemia     Hypertension     Migraine     Moderate COPD (chronic obstructive pulmonary disease) (HCC)     Multiple transfusions     Osteoarthritis     hands    Pneumonia     Scoliosis     Substance abuse ([facility]     ivda and cocaine abuse in past       Past Surgical History:   Procedure Laterality Date    APPENDECTOMY  2017     SECTION      GASTRIC BYPASS SURGERY      HIP SURGERY Right 2019    HIP TOTAL ARTHROPLASTY ANTERIOR APPROACH - [place],  [facility], (Right )    LAPAROSCOPIC APPENDECTOMY N/A 3/27/2017    APPENDECTOMY LAPAROSCOPIC performed by Sudie Bloch, MD at [address] [place] [address]  2002    hgsil    MO COLONOSCOPY W/BIOPSY SINGLE/MULTIPLE N/A 11/10/2018    COLONOSCOPY WITH BIOPSY performed by Eduardo Prajapati MD at [facility]    MO EGD TRANSORAL BIOPSY SINGLE/MULTIPLE N/A 2018    EGD ESOPHAGOGASTRODUODENOSCOPY performed by Jonathan Arreaga MD at [address] [place] [address] Right 2019    HIP TOTAL ARTHROPLASTY ANTERIOR APPROACH - [place],  [facility], performed by Trevon Reed DO at [address]      oesophagitis, candidiasis    UPPER GASTROINTESTINAL ENDOSCOPY  2018    UPPER GASTROINTESTINAL ENDOSCOPY N/A 2018    EGD CONTROL HEMORRHAGE performed by Jonathan Arreaga MD at [place] History   Problem Relation Age of Onset    Breast Cancer Mother     Heart Disease Father        Social History     Socioeconomic History    Marital status: Single     Spouse name: Not on file    Number of children: Not on file    Years of education: Not on file    Highest education level: Not on file   Occupational History    Not on file   Social Needs    Financial resource strain: Not on file    Food insecurity:     Worry: Not on file     Inability: Not on file    Transportation needs:     Medical: Not on file     Non-medical: Not on file   Tobacco Use    Smoking status: Former Smoker     Packs/day: 1.00     Years: 40.00     Pack years: 40.00     Types: Cigarettes     Start date:      Last attempt to quit: 2018     Years since quittin.2    Smokeless tobacco: Never Used   Substance and Sexual Activity    Alcohol use: No     Alcohol/week: 0.0 standard drinks    Drug use: No     Types: IV, Cocaine     Comment: past history    Sexual activity: Not on file   Lifestyle    Physical activity:     Days per week: Not on file     Minutes per session: Not on file    Stress: Not on file   Relationships    Social connections:     Talks on phone: Not on file     Gets together: Not on file     Attends Sabianism service: Not on file     Active member of club or organization: Not on file     Attends meetings of clubs or organizations: Not on file     Relationship status: Not on file    Intimate partner violence:     Fear of current or ex partner: Not on file     Emotionally abused: Not on file     Physically abused: Not on file     Forced sexual activity: Not on file   Other Topics Concern    Not on file   Social History Narrative    Not on file       Current Facility-Administered Medications   Medication Dose Route Frequency Provider Last Rate Last Dose    carvedilol (COREG) tablet 6.25 mg  6.25 mg Oral BID WC Brooke Booth MD   6.25 mg at 20 0855    ferrous sulfate EC tablet 325 mg  325 mg Oral Daily Brooke Booth MD   325 mg at 96/92/65 5924    folic acid (FOLVITE) tablet 1 mg  1 mg Oral Daily Brooke Booth MD   1 mg at 20 08    gabapentin (NEURONTIN) capsule 300 mg  300 mg Oral TID Brooke Booth MD   300 mg at 20 0855    therapeutic multivitamin-minerals 1 tablet  1 tablet Oral Daily Aime Alexander MD   1 tablet at 01/31/20 7717    vitamin B-1 (THIAMINE) tablet 100 mg  100 mg Oral Daily Aime Alexander MD   100 mg at 01/31/20 0855    magnesium hydroxide (MILK OF MAGNESIA) 400 MG/5ML suspension 30 mL  30 mL Oral Daily PRN Aime Alexander MD        [facility]dansetron [facility]) injection 4 mg  4 mg Intravenous Q6H PRN Aime Alexander MD        atorvastatin (LIPITOR) tablet 40 mg  40 mg Oral Nightly Aime Alexander MD   40 mg at 01/30/20 2026    enoxaparin (LOVENOX) injection 40 mg  40 mg Subcutaneous Daily Aime Alexander MD   40 mg at 01/31/20 0855    0.9 % sodium chloride infusion   Intravenous Continuous Aime Alexander MD        sodium chloride flush 0.9 % injection 10 mL  10 mL Intravenous 2 times per day Aime Alexander MD   10 mL at 01/31/20 0855    sodium chloride flush 0.9 % injection 10 mL  10 mL Intravenous PRN Aime Alexander MD        potassium chloride (KLOR-CON M) extended release tablet 40 mEq  40 mEq Oral PRN Aime Alexander MD        Or    potassium bicarb-citric acid (EFFER-K) effervescent tablet 40 mEq  40 mEq Oral PRN Aime Alexander MD        Or    potassium chloride 10 mEq/100 mL IVPB (Peripheral Line)  10 mEq Intravenous PRN Aime Alexander MD        magnesium sulfate 1 g in dextrose 5% 100 mL IVPB  1 g Intravenous PRN Aime Alexander MD        labetalol (NORMODYNE;TRANDATE) injection syringe 10 mg  10 mg Intravenous Q10 Min PRN Aime Alexander MD        mometasone-formoterol [facility]) 200-5 MCG/ACT inhaler 2 puff  2 puff Inhalation BID Aime Alexander MD   2 puff at 01/31/20 4703    ipratropium-albuterol (DUONEB) nebulizer solution 1 ampule  1 ampule Inhalation Q4H While awake Aime Alexander MD   1 ampule at 01/31/20 1131    albuterol (PROVENTIL) nebulizer solution 2.5 mg  2.5 mg Nebulization As Directed RT PRN Aime Alexander MD        butalbital-acetaminophen-caffeine (FIORICET, [facility]) per tablet 1 tablet  1 tablet Oral Q4H PRN Chantell George MD   1 tablet at 01/31/20 0447    labetalol (NORMODYNE;TRANDATE) injection 5 mg  5 mg Intravenous Q4H PRN Samer James Hernandez MD        clopidogrel (PLAVIX) tablet 75 mg  75 mg Oral Daily Samer James Hernandez MD   75 mg at 01/31/20 0220       Allergies   Allergen Reactions    Aspirin Itching    Keflet [Cephalexin]      Tolerated penicillin V 12/13, tolerates cephalexin 6/14       ROS:   Constitutional                  Negative for fever and chills   HEENT                            Negative for ear discharge, ear pain, nosebleed  Eyes                                Negative for photophobia, pain and discharge  Respiratory                      Positive for dyspnea  Cardiovascular                Negative for orthopnea, claudication and PND  Gastrointestinal               Negative for abdominal pain, diarrhea, blood in stool  Musculoskeletal               Negative for joint pain, negative for myalgia  Skin                                 Negative for rash or itching  Endo/heme/allergies       Negative for polydipsia, environmental allergy  Psychiatric                       Negative for suicidal ideation. Patient is not anxious    Vitals:    01/31/20 1149   BP: (!) 158/94   Pulse: 86   Resp: 23   Temp: 97.6 °F (36.4 °C)   SpO2: 91%     Admission weight: 108 lb (49 kg)    Neurological Examination  Constitutional .General exam well groomed   Head/ Ears /Nose/Throat/external ear . Normal exam  Neck and thyroid . Normal size. No bruits  Cardiovascular:  Auscultation of heart with regular rate and rhythm   Musculoskeletal. Muscle bulk and tone normal                                                           Muscle strength 5/5 strength throughout                                                                                No dysmetria or dysdiadokinesis  No tremor   Normal fine motor  Orientation Alert and oriented x 3   Attention and concentration normal  Short term memory normal  Language process and speech normal . No aphasia Cranial nerve 2 normal acuety and visual fields  Cranial nerve 3, 4 and 6 . Extraocular muscles are intact . Pupils are equal and reactive   Cranial nerve 5 . Intact corneal reflex. Normal facial sensation  Cranial nerve 7 normal exam   Cranial nerve 8. Grossly intact hearing   Cranial nerve 9 and 10. Symmetric palate elevation   Cranial nerve 11 , 5 out of 5 strength   Cranial Nerve 12 midline tongue . No atrophy  Sensation . Normal pinprick and light touch   Deep Tendon Reflexes normal  Plantar response flexor bilaterally    Assessment :    Acute left basal ganglia infarction small vessel for hypertension  . Chronic daily headaches . COPD exacerbation . Generalized weakness    Plan: Will place on elavil 10 mg po qhs as headache prophylactic . Okay to discharge if okay wit medicine on plavix 75 mg po qd  , lipitor 40 mg po qd , elavil 10 mg po qhs ,fioricet PRN .  ESR

## 2020-01-31 NOTE — DISCHARGE SUMMARY
[facility]     Department of Internal Medicine - Staff Internal Medicine Service    INPATIENT DISCHARGE SUMMARY        Patient Identification:  Emi Mcallister is a 75 y.o. female. :  1955  MRN: [id number]    Acct: [de-identified]   Admit Date:  2020  Discharge date and time: 2020  2:00 PM   Attending Provider: No att. providers found                                     Admission Diagnoses:   Stroke with cerebral ischemia [facility]) [I63.9]    Discharge Diagnoses: Active Problems:    Essential hypertension    COPD without exacerbation ([facility]    History of hepatitis C    Thrombocytosis (HCC)    COPD exacerbation (HCC)    Chronic daily headache    Stroke with cerebral ischemia ([facility]  Resolved Problems:    * No resolved hospital problems. *       Consults:   none    Brief Inpatient course:  75 y.o. female h/o HTN, HLD, tobacco use, prior IVDA/cocaine use, Hep C, alcoholic cirrhosis, migraines.     Presented initially  for  4-week history of worsening memory, shaking hands, aphasia and right-sided drift, productive cough.  She was a poor historian. Says weak when walking. Memory trouble is that she gets lost in conversation. Lives alone, cooks for self. She feels the tremor has been for a few weeks. Uses a walker at home. Daily headaches past 1 month, otherwise has h/o migraines takes medication for she says. Says sick with a cold 1 month. She left AMA prior to MRI, CUS, cardiac echo. (Also, in 2019,  Left facial droop.   She left AMA at that time without complete work-up to rule out TIA versus stroke versus seizure versus complicated migraine.  She was started on Plavix and Lipitor.   CTA head and neck from April no significant stenosis/occlusion, but  it was concerning for heterogeneous appearance of the bony structure with possible differentials of severe osteopenia versus osseous metastasis versus multiple myeloma.  Multiple lymphadenopathy.)       Presented back mouth 3 times daily as needed for Cough  Qty: 10 capsule, Refills: 0      carvedilol (COREG) 6.25 MG tablet Take 1 tablet by mouth 2 times daily (with meals)  Qty: 60 tablet, Refills: 3      ferrous sulfate (MELISSA-HAO) 325 (65 Fe) MG tablet Take 1 tablet by mouth daily  Qty: 30 tablet, Refills: 0      melatonin 1 MG tablet Take 1 tablet by mouth nightly as needed for Sleep  Qty: 20 tablet, Refills: 0      ipratropium-albuterol (DUONEB) 0.5-2.5 (3) MG/3ML SOLN nebulizer solution Inhale 3 mLs into the lungs every 4 hours as needed for Shortness of Breath  Qty: 360 mL, Refills: 5      mometasone-formoterol (DULERA) 100-5 MCG/ACT inhaler Inhale 2 puffs into the lungs 2 times daily  Qty: 1 Inhaler, Refills: 5      pantoprazole (PROTONIX) 40 MG tablet Take 1 tablet by mouth 2 times daily (before meals)  Qty: 30 tablet, Refills: 3      folic acid (FOLVITE) 1 MG tablet Take 1 tablet by mouth daily  Qty: 30 tablet, Refills: 3      vitamin B-1 100 MG tablet Take 1 tablet by mouth daily  Qty: 30 tablet, Refills: 3      vitamin B-12 250 MCG tablet Take 1 tablet by mouth daily  Qty: 30 tablet, Refills: 3      gabapentin (NEURONTIN) 300 MG capsule Take 300 mg by mouth 3 times daily. .      Multiple Vitamins-Minerals (THERAPEUTIC MULTIVITAMIN-MINERALS) tablet Take 1 tablet by mouth daily  Qty: 30 tablet, Refills: 3             Activity: activity as tolerated  Diet: regular diet  Follow-up:    No follow-up provider specified.   Follow up labs: none  Follow up imaging: none  Note that over 30 minutes was spent in preparing discharge papers, discussing discharge with patient, medication review, etc.    Yariel Angeles DO         Department of Internal Medicine  [address] [facility]         1/31/2020, 3:53 PM

## 2020-01-31 NOTE — CARE COORDINATION
Cab ordered for patient for transport home    Discharge [facility] Case Management Department  Written by: Carlos Sommer RN    Patient Name: Maria Esther Hennessy  Attending Provider: No att. providers found  Admit Date: 2020 11:35 AM  MRN: [id number]  Account: [de-identified]                     : 1955  Discharge Date: 2020      Disposition: home    Carlos Sommer RN

## 2020-01-31 NOTE — PROGRESS NOTES
Smoking Cessation - topics covered   []  Health Risks  []  Benefits of Quitting   []  Smoking Cessation  []  Patient has no history of tobacco use per note in significant history. [x]  Patient is former smoker per note in significant history. Patient quit in 2018. [x]  No need for tobacco cessation education. []  Booklet given  []  Patient verbalizes understanding. []  Patient denies need for tobacco cessation education. []  Unable to meet with patient today. Will follow up as able.   Maia Shone  9:34 AM

## 2020-01-31 NOTE — PLAN OF CARE
Problem: Pain:  Goal: Pain level will decrease  Description  Pain level will decrease  Outcome: Ongoing  Goal: Control of acute pain  Description  Control of acute pain  Outcome: Ongoing  Goal: Control of chronic pain  Description  Control of chronic pain  Outcome: Ongoing   Pain level assessment complete. Pt continues to complain of headache. Pt educated on pain scale and control interventions. PRN pain medication given per pt request. Pt instructed to call out with new onset of pain or unrelieved pain. Will continue to monitor.

## 2020-01-31 NOTE — PROGRESS NOTES
Physical Therapy    Facility/Department: [facility] NEURO  Initial Assessment    NAME: Emi Daniels  : 1955  MRN: [id number]    Date of Service: 2020  Chief Complaint   Patient presents with    Headache     pt reports headache x 5 days;  frontal in location;  oriented to place, time, and person. Discharge Recommendations:    Further therapy recommended at discharge. PT Equipment Recommendations  Equipment Needed: Yes  Other: has rollator at home - unsafe, broken breaks/break wires    Assessment   Body structures, Functions, Activity limitations: Decreased functional mobility ; Decreased ROM; Decreased strength;Decreased safe awareness;Decreased balance;Decreased endurance  Assessment: Pt Lissy to go supine->sit in bed, otherwise supervision for bed mobility. Pt grossly CGA for safety with transfers d/t impulsiveness. Amb x25 ft CGA using rollator, takes frequent rest breaks and c/o fatigue throughout actvity. Pt is impulsive with decreased safety awareness with all tasks. Pt would benefit from home PT services post d/c to address safety in home setting. At this time pt would benefit from acute PT services to address deficits. Prognosis: Fair  Decision Making: Medium Complexity  REQUIRES PT FOLLOW UP: Yes  Activity Tolerance  Activity Tolerance: Patient Tolerated treatment well;Patient limited by endurance; Patient limited by fatigue       Patient Diagnosis(es): The primary encounter diagnosis was Chronic intractable headache, unspecified headache type. Diagnoses of Generalized weakness and Decreased activities of daily living (ADL) were also pertinent to this visit.      has a past medical history of Appendicitis, CHF (congestive heart failure) ([address] Chronic respiratory failure with hypoxia and hypercapnia ([address] Chronic rhinitis, Cigarette smoker, COPD (chronic obstructive pulmonary disease) ([address] Depression, Dysphagia, Essential hypertension, GERD (gastroesophageal reflux disease), Heart failure, diastolic, with acute decompensation (HCC), Hepatitis C, chronic (HCC), History of smoking at least 1 pack per day for at least 30 years, Hyperlipidemia, Hypertension, Migraine, Moderate COPD (chronic obstructive pulmonary disease) ([facility] [facility] Multiple transfusions, Osteoarthritis, Pneumonia, Scoliosis, and Substance abuse ([facility] [address] has a past surgical history that includes  section; LEEP (); Upper gastrointestinal endoscopy (); Gastric bypass surgery; Appendectomy (2017); laparoscopic appendectomy (N/A, 3/27/2017); Upper gastrointestinal endoscopy (2018); Upper gastrointestinal endoscopy (N/A, 2018); pr colonoscopy w/biopsy single/multiple (N/A, 11/10/2018); pr egd transoral biopsy single/multiple (N/A, 2018); hip surgery (Right, 2019); and Total hip arthroplasty (Right, 2019). Restrictions  Restrictions/Precautions  Restrictions/Precautions: Fall Risk, General Precautions  Required Braces or Orthoses?: No  Position Activity Restriction  Other position/activity restrictions: up with assist  Vision/Hearing  Vision: Impaired  Vision Exceptions: Wears glasses for reading  Hearing: Within functional limits     Subjective  General  Chart Reviewed: Yes  Patient assessed for rehabilitation services?: Yes  Response To Previous Treatment: Not applicable  Family / Caregiver Present: No  Follows Commands: Within Functional Limits  General Comment  Comments: co-eval with OT  Subjective  Subjective: RN reports pt is okay for therapy. Pt is annoyed that people are continuoulsy coming into her room but agreeable to therapy, in bed finishing breakfast at arrival.   Pain Screening  Patient Currently in Pain: Yes  Pain Assessment  Pain Assessment: 0-10  Pain Level: 7  Pain Type: Acute pain  Pain Location: Back  Non-Pharmaceutical Pain Intervention(s): Ambulation/Increased Activity; Distraction  Response to Pain Intervention: Patient Satisfied  Vital Signs  Patient Currently in Pain: Yes  Pre Treatment Pain Screening  Intervention List: Patient able to continue with treatment    Orientation  Orientation  Overall Orientation Status: Impaired(unable to recall name of hospital and year)  Orientation Level: Disoriented to place; Disoriented to time  Social/Functional History  Social/Functional History  Lives With: Alone  Type of Home: Apartment(third floor)  Home Layout: One level  Home Access: Elevator, Level entry  Bathroom Shower/Tub: Tub/Shower unit(sits on floor of shower to bathe)  Bathroom Toilet: Standard  Bathroom Equipment: Grab bars in shower  Home Equipment: 4 wheeled walker  Receives Help From: Family  ADL Assistance: Independent  Homemaking Assistance: Needs assistance(sister assists as needed)  Homemaking Responsibilities: Yes  Ambulation Assistance: Independent  Transfer Assistance: Independent  Active : No  Patient's  Info: medical cab or sister drives  Cognition   Cognition  Overall Cognitive Status: Exceptions  Attention Span: Difficulty dividing attention  Memory: Decreased short term memory  Safety Judgement: Decreased awareness of need for assistance;Decreased awareness of need for safety    Objective          AROM RLE (degrees)  RLE AROM: WFL  AROM LLE (degrees)  LLE AROM : WFL  AROM RUE (degrees)  RUE AROM : WFL  AROM LUE (degrees)  LUE AROM : WFL  Strength RLE  Strength RLE: Exception  R Hip Flexion: 3-/5  R Knee Flexion: 4+/5  R Knee Extension: 4+/5  R Ankle Dorsiflexion: 3/5  R Ankle Plantar flexion: 2-/5  Strength LLE  Strength LLE: Exception  L Hip Flexion: 4/5  L Knee Flexion: 4+/5  L Knee Extension: 4-/5  L Ankle Dorsiflexion: 3+/5  L Ankle Plantar Flexion: 3+/5  Strength RUE  Strength RUE: Exception  Comment: grossly 3/5  Strength LUE  Strength LUE: Exception  Comment: grossly 3/5  Tone RLE  RLE Tone: Normotonic  Tone LLE  LLE Tone: Normotonic  Motor Control  Gross Motor?: WFL  Sensation  Overall Sensation Status: WFL  Bed mobility  Supine to Sit: Minimal assistance  Sit to Supine: Stand by assistance  Scooting: Stand by assistance  Comment: HOB elevated ~35 deg with use of bedrails  Transfers  Sit to Stand: Contact guard assistance  Stand to sit: Contact guard assistance  Comment: pt is quick moving and impulsive with transfers; completes with rollator from home which does not lock  Ambulation  Ambulation?: Yes  More Ambulation?: No  Ambulation 1  Surface: level tile  Device: Rolling Walker  Assistance: Contact guard assistance  Quality of Gait: narrow NERY, equal step length  Distance: 25ft  Comments: pt impulsive and unsafe with AD from home - wheels do not lock; poor posturing intermittently leaning on AD with forearms, frequent rest breaks  Stairs/Curb  Stairs?: No     Balance  Posture: Good  Sitting - Static: Good  Sitting - Dynamic: Good  Standing - Static: Good  Standing - Dynamic: [address]  Times per week: 4-5x/wk  Current Treatment Recommendations: Strengthening, Balance Training, Functional Mobility Training, Transfer Training, Safety Education & Training, Endurance Training, Home Exercise Program, Equipment Evaluation, Education, & procurement, Patient/Caregiver Education & Training, Gait Training  Safety Devices  Type of devices:  All fall risk precautions in place, Bed alarm in place, Gait belt, Left in bed, Nurse notified, Call light within reach  Restraints  Initially in place: No    AM-PAC Score     AM-PAC Inpatient Mobility without Stair Climbing Raw Score : 18 (01/31/20 0945)  AM-PAC Inpatient without Stair Climbing T-Scale Score : 51.97 (01/31/20 0945)  Mobility Inpatient CMS 0-100% Score: 23.26 (01/31/20 0945)  Mobility Inpatient without Stair CMS G-Code Modifier : Leonel Beverly (01/31/20 0945)       Goals  Short term goals  Time Frame for Short term goals: 10 visits  Short term goal 1: Amb x100 ft MOD I with rollator for safe return to home setting  Short term goal 2: Transfer MOD I with rollator for increased independence  Short term

## 2020-01-31 NOTE — PROGRESS NOTES
abuse ([facility] has a past surgical history that includes  section; LEEP (); Upper gastrointestinal endoscopy (); Gastric bypass surgery; Appendectomy (2017); laparoscopic appendectomy (N/A, 3/27/2017); Upper gastrointestinal endoscopy (2018); Upper gastrointestinal endoscopy (N/A, 2018); pr colonoscopy w/biopsy single/multiple (N/A, 11/10/2018); pr egd transoral biopsy single/multiple (N/A, 2018); hip surgery (Right, 2019); and Total hip arthroplasty (Right, 2019). Restrictions  Restrictions/Precautions  Restrictions/Precautions: Fall Risk, General Precautions  Required Braces or Orthoses?: No  Position Activity Restriction  Other position/activity restrictions: up with assist    Subjective   General  Patient assessed for rehabilitation services?: Yes  Family / Caregiver Present: No  Patient Currently in Pain: Yes  Pain Assessment  Pain Assessment: 0-10  Pain Level: 7  Pain Type: Acute pain  Pain Location: Back  Non-Pharmaceutical Pain Intervention(s): Ambulation/Increased Activity; Emotional support  Response to Pain Intervention: Patient Satisfied  Vital Signs  Level of Consciousness: Alert  Patient Currently in Pain: Yes     Social/Functional History  Social/Functional History  Lives With: Alone  Type of Home: Apartment(third floor)  Home Layout: One level  Home Access: Elevator, Level entry  Bathroom Shower/Tub: Tub/Shower unit(sits on floor of shower to bathe)  Bathroom Toilet: Standard  Bathroom Equipment: Grab bars in shower  Home Equipment: 4 wheeled walker  Receives Help From: Family  ADL Assistance: Independent  Homemaking Assistance: Needs assistance(sister assists as needed)  Homemaking Responsibilities: Yes  Ambulation Assistance: Independent  Transfer Assistance: Independent  Active : No  Patient's  Info: medical cab or sister drives     Objective   Vision: Impaired  Vision Exceptions: Wears glasses for reading  Hearing: Within functional limits          Balance  Sitting Balance: Supervision  Standing Balance: Contact guard assistance  Standing Balance  Time: ~5 minutes total  Activity: functional mobility   Functional Mobility  Functional - Mobility Device: 4-Wheeled Walker  Activity: Other  Assist Level: Contact guard assistance  Functional Mobility Comments: Pt frequently sitting during mobility  ADL  Feeding: Independent(i'ly eating upon arrival)  Grooming: Setup;Supervision  UE Bathing: Minimal assistance; Increased time to complete  LE Bathing: Moderate assistance; Increased time to complete  UE Dressing: Minimal assistance; Increased time to complete  LE Dressing: Moderate assistance; Increased time to complete  Toileting: Minimal assistance        Bed mobility  Supine to Sit: Minimal assistance  Sit to Supine: Stand by assistance  Transfers  Sit to stand: Contact guard assistance  Stand to sit: Contact guard assistance     Cognition  Overall Cognitive Status: Exceptions  Attention Span: Difficulty dividing attention  Memory: Decreased short term memory  Safety Judgement: Decreased awareness of need for assistance;Decreased awareness of need for safety        Sensation  Overall Sensation Status: WFL        LUE AROM (degrees)  LUE AROM : WFL  RUE AROM (degrees)  RUE AROM : WFL  LUE Strength  Gross LUE Strength: Exceptions to [facility]  L Shoulder Flex: 3/5  L Elbow Flex: 3+/5  L Hand General: 3/5  RUE Strength  Gross RUE Strength: Exceptions to [facility] [place]  R Shoulder Flex: 3/5  R Elbow Flex: 3+/5  R Hand General: 3/5      Plan   Plan  Times per week: 3-4x    AM-PAC Score   AM-[facility] Inpatient Daily Activity Raw Score: 18 (01/31/20 0951)  AM-PAC Inpatient ADL T-Scale Score : 38.66 (01/31/20 0951)  ADL Inpatient CMS 0-100% Score: 46.65 (01/31/20 0951)  ADL Inpatient CMS G-Code Modifier : CK (01/31/20 0951)    Goals  Short term goals  Time Frame for Short term goals: By discharge pt bennett Miguel term goal 1: demo independent transfers  Short term goal 2: demo bed mobility independently  Short term goal 3: demo 10 minutes standing tolerance to engage in functional tasks i'ly  Short term goal 4: complete UB/LB ADL tasks with set up and supervision       Therapy Time   Individual Concurrent Group Co-treatment   Time In  9:11         Time Out  9:35         Minutes  24      co eval with PT         Janine Cowan, OTR/L

## 2020-01-31 NOTE — DISCHARGE SUMMARY
[facility]     Neurology  INPATIENT DISCHARGE SUMMARY        Patient Identification:  Emi Collazo is a 75 y.o. female. :  1955  MRN: [id number]     Acct: [de-identified]   Admit Date:  2020  Discharge date and time: ama 2020  Attending Provider: No att. providers found                                     Admission Diagnoses:   Stroke with cerebral ischemia [facility]) [I63.9]    Discharge Diagnoses: Active Problems:    Essential hypertension    COPD without exacerbation ([facility] [address]    History of hepatitis C    Thrombocytosis (HCC)    COPD exacerbation (HCC)    Chronic daily headache    Stroke with cerebral ischemia ([facility] [address]  Resolved Problems:    * No resolved hospital problems. *       Consults:   none    Brief Inpatient course:    75 y.o. female h/o HTN, HLD, tobacco use, prior IVDA/cocaine use, Hep C, alcoholic cirrhosis, migraines.     Presented initially  for  4-week history of worsening memory, shaking hands, aphasia and right-sided drift, productive cough.  She was a poor historian. Says weak when walking.  Memory trouble is that she gets lost in conversation. Lives alone, cooks for self. She feels the tremor has been for a few weeks. Uses a walker at home.  Daily headaches past 1 month, otherwise has h/o migraines takes medication for she says. Says sick with a cold 1 month. MRI did show L lacunar infarction. She left AMA prior to CUS, cardiac echo. (Also, in 2019,  Left facial droop.   She left AMA at that time without complete work-up to rule out TIA versus stroke versus seizure versus complicated migraine. Rose Marie Hazard was started on Plavix and Lipitor.   CTA head and neck from April no significant stenosis/occlusion, but  it was concerning for heterogeneous appearance of the bony structure with possible differentials of severe osteopenia versus osseous metastasis versus multiple myeloma.  Multiple lymphadenopathy.)       Procedures:  none  Any Hospital Acquired needed for Sleep, Disp-20 tablet, R-0Normal      ipratropium-albuterol (DUONEB) 0.5-2.5 (3) MG/3ML SOLN nebulizer solution Inhale 3 mLs into the lungs every 4 hours as needed for Shortness of Breath, Disp-360 mL, R-5Normal      mometasone-formoterol (DULERA) 100-5 MCG/ACT inhaler Inhale 2 puffs into the lungs 2 times daily, Disp-1 Inhaler, R-5Normal      pantoprazole (PROTONIX) 40 MG tablet Take 1 tablet by mouth 2 times daily (before meals), Disp-30 tablet, [id number]      folic acid (FOLVITE) 1 MG tablet Take 1 tablet by mouth daily, Disp-30 tablet, R-3Normal      vitamin B-1 100 MG tablet Take 1 tablet by mouth daily, Disp-30 tablet, R-3Normal      vitamin B-12 250 MCG tablet Take 1 tablet by mouth daily, Disp-30 tablet, R-3Normal      gabapentin (NEURONTIN) 300 MG capsule Take 300 mg by mouth 3 times daily. Canda Nay Historical Med      Multiple Vitamins-Minerals (THERAPEUTIC MULTIVITAMIN-MINERALS) tablet Take 1 tablet by mouth daily, Disp-30 tablet, R-3Normal             Activity: activity as tolerated    Diet: regular diet    Follow-up:    No follow-up provider specified.     Follow up labs: none     Follow up imaging: all that was not done when in the hospital.     Note that over 30 minutes was spent in preparing discharge papers, discussing discharge with patient, medication review, etc.      Anay Rodriguez DO         Neurology, PGY2  [facility]         1/31/2020, 3:54 PM

## 2020-01-31 NOTE — PROGRESS NOTES
CLINICAL PHARMACY NOTE: MEDS TO [address] Select Patient?: No  Total # of Prescriptions Filled: 4   The following medications were delivered to the patient:  · Amitriptyline  · Atorvastatin  · Clopidogrel  · Fiorcet  Total # of Interventions Completed: 0  Time Spent (min): 0    Additional Documentation:  Patient picked up at counter.

## 2020-01-31 NOTE — CONSULTS
[address]     Department of Internal Medicine - Staff Internal Medicine Teaching Service          CONSULT  NOTE / HISTORY AND PHYSICAL EXAMINATION   Date: 1/31/2020  Patient Name: Tian Morgan  Date of admission: 1/30/2020 11:35 AM  YOB: 1955  PCP: No primary care provider on file. History Obtained From:  patient, electronic medical record    Consult Reason:     Consult Reason: Medical Management    HISTORY OF PRESENTING ILLNESS     The patient is a pleasant 75 y.o. female presents with a chief complaint of unilateral headaches not associated with nausea or vomiting or visual changes or and was found to have chronic daily headaches. Patient was admitted under neurology service with Dr. Georges Leonard. IM was consulted for Medical management. Patient was admitted on 1/27/2020 with generalized weakness and chronic headaches and was found to have left posterior frontal acute/subacute ischemia. Patient was given Plavix and Fioricet for headaches. She was also found to COPD exacerbation secondary to upper respiratory tract infection at that time and was started on prednisone, Levaquin and bronchodilators. However patient left AMA at that time. Patient came back with complaints of headache and was admitted under neurology, however he never concerned about mild COPD exacerbation. On my evaluation, patient was having low-grade fever with T-max of 99.9, hypertension blood pressure and 150s/ 90s. Patient is on room air and saturating well. CBC unremarkable except for thrombocytosis with platelet of 060. BMP on admission suggestive of hypokalemia rest within normal limits.   LFTs showed  and AST 36-everything else within normal limits    Changes in weight-none  Changes in appetite-none  Abdominal pain-none  Changes in bowel habits-none, Last bowel movement yesterday, blood none  Changes in bladder habits-none mild  Cough- mild, Productivity- non productive  Nausea/ Vomiting none    Last Echo  11/13/2019- EF 69%, small LV cavity, moderate LVH, basal septal hypertrophy, grade 1 left ventricular diastolic dysfunction, LA is mildly dilated. Mild TR. RVSP-35 mmHg.     Last lipid profile  1/27/2020-within normal limits    Last TSH   1/27/2028-1.17    Last HbA1C   1/27/2020-5.3    Review of Systems:  General ROS: Completed and except as mentioned above were negative   HEENT ROS: Completed and except as mentioned above were negative   Allergy and Immunology ROS:  Completed and except as mentioned above were negative  Hematological and Lymphatic ROS:  Completed and except as mentioned above were negative  Respiratory ROS:  Completed and except as mentioned above were negative  Cardiovascular ROS:  Completed and except as mentioned above were negative  Gastrointestinal ROS: Completed and except as mentioned above were negative  Genito-Urinary ROS:  Completed and except as mentioned above were negative  Musculoskeletal ROS:  Completed and except as mentioned above were negative  Neurological ROS:  Completed and except as mentioned above were negative  Skin & Dermatological ROS:  Completed and except as mentioned above were negative  Psychological ROS:  Completed and except as mentioned above were negative    PAST MEDICAL HISTORY     Past Medical History:   Diagnosis Date    Appendicitis 3/27/2017    CHF (congestive heart failure) ([address]     Chronic respiratory failure with hypoxia and hypercapnia (HCC)     Chronic rhinitis     Cigarette smoker     COPD (chronic obstructive pulmonary disease) ([address] 8/5/2018    Depression     Dysphagia     Essential hypertension 8/11/2018    GERD (gastroesophageal reflux disease)     Heart failure, diastolic, with acute decompensation ([address] 9/5/2018    Hepatitis C, chronic ([address]     History of smoking at least 1 pack per day for at least 30 years     Hyperlipidemia     Hypertension     Migraine     Moderate [facility] disease) [facility]     Multiple transfusions     Osteoarthritis     hands    Pneumonia     Scoliosis     Substance abuse ([facility]     ivda and cocaine abuse in past       PAST SURGICAL HISTORY     Past Surgical History:   Procedure Laterality Date    APPENDECTOMY  2017     SECTION      GASTRIC BYPASS SURGERY      HIP SURGERY Right 2019    HIP TOTAL ARTHROPLASTY ANTERIOR APPROACH - [facility],  C-ARM, (Right )    LAPAROSCOPIC APPENDECTOMY N/A 3/27/2017    APPENDECTOMY LAPAROSCOPIC performed by Sudie Bloch, MD at [address] [place]  2002    hgsil    [place] COLONOSCOPY W/BIOPSY SINGLE/MULTIPLE N/A 11/10/2018    COLONOSCOPY WITH BIOPSY performed by Eduardo Prajapati MD at [facility]    IL EGD TRANSORAL BIOPSY SINGLE/MULTIPLE N/A 2018    EGD ESOPHAGOGASTRODUODENOSCOPY performed by Jonathan Arreaga MD at [address] Right 2019    HIP TOTAL ARTHROPLASTY ANTERIOR APPROACH - [facility],  C-ARM, performed by Trevon Reed DO at [address]      oesophagitis, candidiasis    UPPER GASTROINTESTINAL ENDOSCOPY  2018    UPPER GASTROINTESTINAL ENDOSCOPY N/A 2018    EGD CONTROL HEMORRHAGE performed by Jonathan Arreaga MD at [address]     Aspirin and Keflet [cephalexin]    MEDICATIONS PRIOR TO ADMISSION     Prior to Admission medications    Medication Sig Start Date End Date Taking? Authorizing Provider   albuterol sulfate HFA (PROVENTIL HFA) 108 (90 Base) MCG/ACT inhaler Inhale 1-2 puffs into the lungs once for 1 dose 20  Micheline Jensen MD   ondansetron (ZOFRAN ODT) 4 MG disintegrating tablet Take 1 tablet by mouth every 8 hours as needed for Nausea 19   Alan Alicea PA-C   docusate sodium (COLACE, DULCOLAX) 100 MG CAPS Take 100 mg by mouth 2 times daily 19   Adarsh Zapata MD   [facility].  Devices [facility]) MISC 1 Device by Does not apply route once for 1 dose 8/5/19 8/5/19  Linda Baez PA-C   hydrOXYzine (ATARAX) 25 MG tablet Take 1 tablet by mouth every 8 hours as needed for Itching 8/5/19   Linda Baez PA-C   acetaminophen (TYLENOL) 325 MG tablet Take 1 tablet by mouth every 6 hours as needed for Pain 6/22/19   Keyonna Whitehead, DO   benzonatate (TESSALON PERLES) 100 MG capsule Take 1 capsule by mouth 3 times daily as needed for Cough 3/11/19   Dre Lopez, DO   carvedilol (COREG) 6.25 MG tablet Take 1 tablet by mouth 2 times daily (with meals) 2/7/19   Amy Merritt MD   ferrous sulfate (MELISSA-HAO) 325 (65 Fe) MG tablet Take 1 tablet by mouth daily 2/7/19   Amy Merritt MD   melatonin 1 MG tablet Take 1 tablet by mouth nightly as needed for Sleep 1/11/19   Rui Sloan MD   ipratropium-albuterol (DUONEB) 0.5-2.5 (3) MG/3ML SOLN nebulizer solution Inhale 3 mLs into the lungs every 4 hours as needed for Shortness of Breath 1/11/19   Rui Sloan MD   mometasone-formoterol [facility]) 100-5 MCG/ACT inhaler Inhale 2 puffs into the lungs 2 times daily 12/5/18   Ned Yeboah MD   pantoprazole (PROTONIX) 40 MG tablet Take 1 tablet by mouth 2 times daily (before meals) 11/13/18   Edi Gilmore MD   folic acid (FOLVITE) 1 MG tablet Take 1 tablet by mouth daily 10/13/18   Eryn Gonzalez MD   vitamin B-1 100 MG tablet Take 1 tablet by mouth daily 10/13/18   Eryn Gonzalez MD   vitamin B-12 250 MCG tablet Take 1 tablet by mouth daily 10/13/18   Eryn Gonzalez MD   gabapentin (NEURONTIN) 300 MG capsule Take 300 mg by mouth 3 times daily. Sinai Villarreal     Historical MD Marnie   Multiple Vitamins-Minerals (THERAPEUTIC MULTIVITAMIN-MINERALS) tablet Take 1 tablet by mouth daily 8/12/18   French Butler MD       SOCIAL HISTORY     Tobacco: Former smoker, smoker 1 PPD for 30+ years, quit a few days back  Alcohol: Denied  Illicits: Denied    FAMILY HISTORY     Family History   Problem Relation Age of Onset    Breast Cancer Mother     Heart Disease Father 0.0 0.0 per 100 WBC    Differential Type NOT REPORTED     Seg Neutrophils 54 36 - 65 %    Lymphocytes 29 24 - 43 %    Monocytes 14 (H) 3 - 12 %    Eosinophils % 1 1 - 4 %    Basophils 1 0 - 2 %    Immature Granulocytes 1 (H) 0 %    Segs Absolute 3.06 1.50 - 8.10 k/uL    Absolute Lymph # 1.62 1.10 - 3.70 k/uL    Absolute Mono # 0.78 0.10 - 1.20 k/uL    Absolute Eos # 0.08 0.00 - 0.44 k/uL    Basophils Absolute 0.03 0.00 - 0.20 k/uL    Absolute Immature Granulocyte 0.03 0.00 - 0.30 k/uL    WBC Morphology NOT REPORTED     RBC Morphology ANISOCYTOSIS PRESENT     Platelet Estimate NOT REPORTED    COMPREHENSIVE METABOLIC PANEL    Collection Time: 01/30/20 12:37 PM   Result Value Ref Range    Glucose 81 70 - 99 mg/dL    BUN 9 8 - 23 mg/dL    CREATININE 0.57 0.50 - 0.90 mg/dL    Bun/Cre Ratio NOT REPORTED 9 - 20    Calcium 9.2 8.6 - 10.4 mg/dL    Sodium 138 135 - 144 mmol/L    Potassium 3.6 (L) 3.7 - 5.3 mmol/L    Chloride 104 98 - 107 mmol/L    CO2 23 20 - 31 mmol/L    Anion Gap 11 9 - 17 mmol/L    Alkaline Phosphatase 121 (H) 35 - 104 U/L    ALT 18 5 - 33 U/L    AST 36 (H) <32 U/L    Total Bilirubin 0.47 0.3 - 1.2 mg/dL    Total Protein 7.7 6.4 - 8.3 g/dL    Alb 3.8 3.5 - 5.2 g/dL    Albumin/Globulin Ratio 1.0 1.0 - 2.5    GFR Non-African American >60 >60 mL/min    GFR African American >60 >60 mL/min    GFR Comment          GFR Staging NOT REPORTED    POC Glucose Fingerstick    Collection Time: 01/30/20  8:20 PM   Result Value Ref Range    POC Glucose 65 65 - 105 mg/dL   Sedimentation Rate    Collection Time: 01/30/20 10:45 PM   Result Value Ref Range    Sed Rate 18 0 - 20 mm   Basic Metabolic Panel w/ Reflex to MG    Collection Time: 01/31/20  4:45 AM   Result Value Ref Range    Glucose 76 70 - 99 mg/dL    BUN 11 8 - 23 mg/dL    CREATININE 0.47 (L) 0.50 - 0.90 mg/dL    Bun/Cre Ratio NOT REPORTED 9 - 20    Calcium 8.7 8.6 - 10.4 mg/dL    Sodium 137 135 - 144 mmol/L    Potassium 3.9 3.7 - 5.3 mmol/L    Chloride 103 98 -

## 2020-01-31 NOTE — PROGRESS NOTES
Morris Gimenez, PPatient Assessment complete. Stroke with cerebral ischemia ([address] [I63.9] . Vitals:    01/30/20 1959   BP:    Pulse:    Resp: 24   Temp:    SpO2: 99%   . Patients home meds are   Prior to Admission medications    Medication Sig Start Date End Date Taking? Authorizing Provider   albuterol sulfate HFA (PROVENTIL HFA) 108 (90 Base) MCG/ACT inhaler Inhale 1-2 puffs into the lungs once for 1 dose 1/9/20 1/9/20  Lauro Marin MD   ondansetron (ZOFRAN ODT) 4 MG disintegrating tablet Take 1 tablet by mouth every 8 hours as needed for Nausea 9/9/19   Sully Urban PA-C   docusate sodium (COLACE, DULCOLAX) 100 MG CAPS Take 100 mg by mouth 2 times daily 8/22/19   Drew Apple MD   [facility].  Devices [facility]) MISC 1 Device by Does not apply route once for 1 dose 8/5/19 8/5/19  Sully Urban PA-C   hydrOXYzine (ATARAX) 25 MG tablet Take 1 tablet by mouth every 8 hours as needed for Itching 8/5/19   Sully Urban PA-C   acetaminophen (TYLENOL) 325 MG tablet Take 1 tablet by mouth every 6 hours as needed for Pain 6/22/19   Kim Kimball,    benzonatate (TESSALON PERLES) 100 MG capsule Take 1 capsule by mouth 3 times daily as needed for Cough 3/11/19   Triny Lopez,    carvedilol (COREG) 6.25 MG tablet Take 1 tablet by mouth 2 times daily (with meals) 2/7/19   Jose Miguel Steve MD   ferrous sulfate (MELISSA-HAO) 325 (65 Fe) MG tablet Take 1 tablet by mouth daily 2/7/19   Jose Miguel Steve MD   melatonin 1 MG tablet Take 1 tablet by mouth nightly as needed for Sleep 1/11/19   Srinath Herrera MD   ipratropium-albuterol (DUONEB) 0.5-2.5 (3) MG/3ML SOLN nebulizer solution Inhale 3 mLs into the lungs every 4 hours as needed for Shortness of Breath 1/11/19   Srinath Herrera MD   mometasone-formoterol [facility]) 100-5 MCG/ACT inhaler Inhale 2 puffs into the lungs 2 times daily 12/5/18   Johnnie Tamayo MD   pantoprazole (PROTONIX) 40 MG tablet Take 1 tablet by mouth 2 times daily (before meals) 11/13/18 Adelaide Briseno MD   folic acid (FOLVITE) 1 MG tablet Take 1 tablet by mouth daily 10/13/18   Lyndon Sexton MD   vitamin B-1 100 MG tablet Take 1 tablet by mouth daily 10/13/18   Lyndon Sexton MD   vitamin B-12 250 MCG tablet Take 1 tablet by mouth daily 10/13/18   Lyndon Sexton MD   gabapentin (NEURONTIN) 300 MG capsule Take 300 mg by mouth 3 times daily. Aly Dye MD   Multiple Vitamins-Minerals (THERAPEUTIC MULTIVITAMIN-MINERALS) tablet Take 1 tablet by mouth daily 8/12/18   Mayur Self MD   .  Recent Surgical History: None = 0     Assessment: Patient states that she is short of breath on exertion. She states that she has a history of COPD and takes breathing treatments as needed at home. She states that she does not wear oxygen at home and does not want to wear a BIPAP while at the hospital. Breath sounds are clear bilaterally.  Patient is currently resting on 2 L of oxygen NC.         RR 24  Breath Sounds: Clear      · Bronchodilator assessment at level  3  · Hyperinflation assessment at level   · Secretion Management assessment at level    ·   · [x]    Bronchodilator Assessment  BRONCHODILATOR ASSESSMENT SCORE  Score 0 1 2 3 4 5   Breath Sounds   []  Patient Baseline []  No Wheeze good aeration [x]  Faint, scattered wheezing, good aeration []  Expiratory Wheezing and or moderately diminished []  Insp/Exp wheeze and/or very diminished []  Insp/Exp and/ or marked distress   Respiratory Rate   []  Patient Baseline []  Less than 20 []  Less than 20 [x]  20-25 []  Greater than 25 []  Greater than 25   Peak flow % of Pred or PB [x]  NA   []  Greater than 90%  []  81-90% []  71-80% []  Less than or equal to 70%  or unable to perform []  Unable due to Respiratory Distress   Dyspnea re []  Patient Baseline []  No SOB []  No SOB [x]  SOB on exertion []  SOB min activity []  At rest/acute   e FEV% Predicted       [x]  NA []  Above 69%  []  Unable []  Above 60-69%  []  Unable []  Above 50-59%  []  Unable []  Above 35-49%  []  Unable []  Less than 35%  []  Unable

## 2020-02-02 ENCOUNTER — HOSPITAL ENCOUNTER (EMERGENCY)
Age: 65
Discharge: HOME | End: 2020-02-02
Attending: EMERGENCY MEDICINE
Payer: MEDICARE

## 2020-02-02 VITALS
OXYGEN SATURATION: 93 % | HEART RATE: 100 BPM | TEMPERATURE: 98.6 F | WEIGHT: 110 LBS | HEIGHT: 60 IN | SYSTOLIC BLOOD PRESSURE: 152 MMHG | BODY MASS INDEX: 21.6 KG/M2 | RESPIRATION RATE: 16 BRPM | DIASTOLIC BLOOD PRESSURE: 89 MMHG

## 2020-02-02 PROCEDURE — 99283 EMERGENCY DEPT VISIT LOW MDM: CPT

## 2020-02-02 ASSESSMENT — PAIN DESCRIPTION - PAIN TYPE: TYPE: ACUTE PAIN

## 2020-02-02 ASSESSMENT — PAIN SCALES - GENERAL: PAINLEVEL_OUTOF10: 4

## 2020-02-02 NOTE — ED PROVIDER NOTES
[address] [place] [address] ED  EMERGENCY DEPARTMENT ENCOUNTER      Pt Name: Tian Morgan  MRN: [id number]  [place] 1955  Date of evaluation: 2/2/2020  Provider: Lu Ludwig MD    CHIEF COMPLAINT     No chief complaint on file. HISTORY OF PRESENT ILLNESS  (Location/Symptom, Timing/Onset, Context/Setting, Quality, Duration, Modifying Factors, Severity.)   Emi Daniels is a 75 y.o. female who presents to the emergency department for visual changes. This is been an ongoing issue and she broke her reading glasses. She has been using her sisters. She has an appointment with an eye doctor tomorrow. She was at [facility] recently and had a work-up and was seen by neurology and had a CAT scan. She also has questions about what her medications are for and these were answered. She complains of a headache which is frontal and not precipitated by trauma. Nursing Notes were reviewed.     ALLERGIES     Aspirin and Keflet [cephalexin]    CURRENT MEDICATIONS       Previous Medications    ACETAMINOPHEN (TYLENOL) 325 MG TABLET    Take 1 tablet by mouth every 6 hours as needed for Pain    ALBUTEROL SULFATE HFA (PROVENTIL HFA) 108 (90 BASE) MCG/ACT INHALER    Inhale 1-2 puffs into the lungs once for 1 dose    AMITRIPTYLINE (ELAVIL) 10 MG TABLET    Take 1 tablet by mouth nightly    ATORVASTATIN (LIPITOR) 40 MG TABLET    Take 1 tablet by mouth nightly    BENZONATATE (TESSALON PERLES) 100 MG CAPSULE    Take 1 capsule by mouth 3 times daily as needed for Cough    BUTALBITAL-ACETAMINOPHEN-CAFFEINE (FIORICET, ESGIC) -40 MG PER TABLET    Take 1 tablet by mouth every 4 hours as needed for Headaches    CARVEDILOL (COREG) 6.25 MG TABLET    Take 1 tablet by mouth 2 times daily (with meals)    CLOPIDOGREL (PLAVIX) 75 MG TABLET    Take 1 tablet by mouth daily    DOCUSATE SODIUM (COLACE, DULCOLAX) 100 MG CAPS    Take 100 mg by mouth 2 times daily    FERROUS SULFATE (MELISSA-HAO) 325 (65 FE) MG TABLET    Take 1 tablet by mouth abuse in past       SURGICAL HISTORY           Procedure Laterality Date    APPENDECTOMY  2017     SECTION      GASTRIC BYPASS SURGERY      HIP SURGERY Right 2019    HIP TOTAL [facility]THROPLASTY [facility]  [facility], (Right )    LAPAROSCOPIC APPENDECTOMY N/A 3/27/2017    APPENDECTOMY LAPAROSCOPIC performed by Nick Estrella MD at [address] [place] [address] Drive [place]      hgsil    OR COLONOSCOPY W/BIOPSY SINGLE/MULTIPLE N/A 11/10/2018    [facility]    OR EGD TRANSORAL BIOPSY SINGLE/MULTIPLE N/A 2018    EGD ESOPHAGOGASTRODUODENOSCOPY performed by Eloina Villa MD at [address] Right 2019    HIP TOTAL ARTHROPLASTY ANTERIOR APPROACH - [facility],  C-ARM, performed by Lauro Razo DO at [facility]      oesophagitis, candidiasis    UPPER GASTROINTESTINAL ENDOSCOPY  2018    UPPER GASTROINTESTINAL ENDOSCOPY N/A 2018    EGD CONTROL HEMORRHAGE performed by Eloina Villa MD at [address] [place] [address] HISTORY           Problem Relation Age of Onset    Breast Cancer Mother     Heart Disease Father      Family Status   Relation Name Status    Mother      Father          SOCIAL HISTORY      reports that she quit smoking about 15 months ago. Her smoking use included cigarettes. She started smoking about 50 years ago. She has a 40.00 pack-year smoking history. She has never used smokeless tobacco. She reports that she does not drink alcohol or use drugs. REVIEW OF SYSTEMS    (2-9 systems for level 4, 10 or more for level 5)     Review of Systems   Eyes: Positive for visual disturbance. Neurological: Positive for headaches. Except as noted above the remainder of the review of systems was reviewed and negative.      PHYSICAL EXAM    (up to 7 for level 4, 8 or more for level 5)     Vitals:    20 1258 20 1259   BP: (!) 152/89    Pulse: 100    Resp: 16    Temp: 98.6 °F (37 °C)    SpO2: 93%    Weight:  110 lb (49.9 kg)   Height:  5' (1.524 m)       Physical Exam  Constitutional:       General: She is not in acute distress. Appearance: She is well-developed. She is not diaphoretic. HENT:      Head: Normocephalic and atraumatic. Eyes:      General: No scleral icterus. Right eye: No discharge. Left eye: No discharge. Neck:      Musculoskeletal: Neck supple. Cardiovascular:      Rate and Rhythm: Normal rate and regular rhythm. Pulmonary:      Effort: Pulmonary effort is normal. No respiratory distress. Breath sounds: Normal breath sounds. No stridor. No wheezing or rales. Abdominal:      General: There is no distension. Palpations: Abdomen is soft. Tenderness: There is no abdominal tenderness. Musculoskeletal: Normal range of motion. Lymphadenopathy:      Cervical: No cervical adenopathy. Skin:     General: Skin is warm and dry. Findings: No erythema or rash. Neurological:      Mental Status: She is alert and oriented to person, place, and time. Psychiatric:         Behavior: Behavior normal.             DIAGNOSTIC RESULTS     EKG: All EKG's are interpreted by the Emergency Department Physician who either signs or Co-signs this chart in the absence of a cardiologist.    RADIOLOGY:   Non-plain film images such as CT, Ultrasound and MRI are read by the radiologist. Plain radiographic images are visualized and preliminarily interpreted by the emergency physician with the below findings:    Interpretation per the Radiologist below, if available at the time of this note:        LABS:  Labs Reviewed - No data to display    All other labs were within normal range or not returned as of this dictation.     EMERGENCY DEPARTMENT COURSE and DIFFERENTIAL DIAGNOSIS/MDM:   Vitals:    Vitals:    02/02/20 1258 02/02/20 1259   BP: (!) 152/89    Pulse: 100    Resp: 16    Temp: 98.6 °F (37 °C)

## 2020-02-02 NOTE — ED NOTES
Dr Malini Smith into evaluate patient and ok with patient following with eye  Was seen yesterday and had complete work up.      Mynor Bauer RN  02/02/20 [id number]

## 2020-02-20 ENCOUNTER — APPOINTMENT (OUTPATIENT)
Dept: CT IMAGING | Age: 65
End: 2020-02-20
Payer: MEDICARE

## 2020-02-20 ENCOUNTER — HOSPITAL ENCOUNTER (EMERGENCY)
Age: 65
Discharge: HOME | End: 2020-02-20
Attending: EMERGENCY MEDICINE
Payer: MEDICARE

## 2020-02-20 VITALS
OXYGEN SATURATION: 99 % | TEMPERATURE: 97.2 F | DIASTOLIC BLOOD PRESSURE: 91 MMHG | HEART RATE: 89 BPM | SYSTOLIC BLOOD PRESSURE: 147 MMHG | RESPIRATION RATE: 16 BRPM

## 2020-02-20 PROCEDURE — 6360000002 HC RX W HCPCS: Performed by: EMERGENCY MEDICINE

## 2020-02-20 PROCEDURE — 70450 CT HEAD/BRAIN W/O DYE: CPT

## 2020-02-20 PROCEDURE — 99285 EMERGENCY DEPT VISIT HI MDM: CPT

## 2020-02-20 PROCEDURE — 96374 THER/PROPH/DIAG INJ IV PUSH: CPT

## 2020-02-20 PROCEDURE — 96375 TX/PRO/DX INJ NEW DRUG ADDON: CPT

## 2020-02-20 RX ORDER — DIPHENHYDRAMINE HYDROCHLORIDE 50 MG/ML
12.5 INJECTION INTRAMUSCULAR; INTRAVENOUS ONCE
Status: COMPLETED | OUTPATIENT
Start: 2020-02-20 | End: 2020-02-20

## 2020-02-20 RX ORDER — PROCHLORPERAZINE EDISYLATE 5 MG/ML
10 INJECTION INTRAMUSCULAR; INTRAVENOUS ONCE
Status: COMPLETED | OUTPATIENT
Start: 2020-02-20 | End: 2020-02-20

## 2020-02-20 RX ADMIN — PROCHLORPERAZINE EDISYLATE 10 MG: 5 INJECTION INTRAMUSCULAR; INTRAVENOUS at 16:53

## 2020-02-20 RX ADMIN — DIPHENHYDRAMINE HYDROCHLORIDE 12.5 MG: 50 INJECTION, SOLUTION INTRAMUSCULAR; INTRAVENOUS at 16:54

## 2020-02-20 ASSESSMENT — ENCOUNTER SYMPTOMS
VOMITING: 0
WHEEZING: 0
BLOOD IN STOOL: 0
NAUSEA: 1
CHOKING: 0
CHEST TIGHTNESS: 0
ANAL BLEEDING: 0
DIARRHEA: 0
COUGH: 1
COLOR CHANGE: 0
CONSTIPATION: 0
SHORTNESS OF BREATH: 1
APNEA: 0
SORE THROAT: 1

## 2020-02-20 ASSESSMENT — PAIN DESCRIPTION - DESCRIPTORS: DESCRIPTORS: ACHING

## 2020-02-20 ASSESSMENT — PAIN DESCRIPTION - PAIN TYPE: TYPE: ACUTE PAIN

## 2020-02-20 ASSESSMENT — PAIN SCALES - GENERAL: PAINLEVEL_OUTOF10: 7

## 2020-02-20 NOTE — ED PROVIDER NOTES
[address] ED  EMERGENCY DEPARTMENT ENCOUNTER  RESIDENT    Pt Name: Sam Tillman  MRN: [id number]  [place] 1955  Date of evaluation: 2/20/2020  PCP:  No primary care provider on file. CHIEF COMPLAINT     No chief complaint on file. HISTORY OF PRESENT ILLNESS    Emi Borges is a 75 y.o. female who Patient presented today with nausea and headache she reported having this problem for few days, patient is a poor historian, she also reported low appetite, shortness of breath because of COPD she uses nebulizer and she has oxygen at home but she does not use it because she does not know how to use it,  Patient admitted on 1/31: For aphasia and right-sided drift, and patient left AMA before the work-up is done,  Patient takes clopidogrel and Lipitor    REVIEW OF SYSTEMS       Review of Systems   Constitutional: Positive for appetite change and fatigue. Negative for fever. HENT: Positive for sore throat. Negative for congestion. Respiratory: Positive for cough and shortness of breath. Negative for apnea, choking, chest tightness and wheezing. Cardiovascular: Negative for chest pain, palpitations and leg swelling. Gastrointestinal: Positive for nausea. Negative for anal bleeding, blood in stool, constipation, diarrhea and vomiting. Skin: Negative for color change, pallor, rash and wound.         PAST MEDICAL HISTORY    has a past medical history of Appendicitis, Cerebral artery occlusion with cerebral infarction ([address] CHF (congestive heart failure) ([address] Chronic respiratory failure with hypoxia and hypercapnia (HCC), Chronic rhinitis, Cigarette smoker, COPD (chronic obstructive pulmonary disease) ([address] Depression, Dysphagia, Essential hypertension, GERD (gastroesophageal reflux disease), Heart failure, diastolic, with acute decompensation ([address] Hepatitis C, chronic ([address] History of smoking at least 1 pack per day for at least 30 years, Hyperlipidemia, Hypertension, Migraine, Itching  Qty: 12 tablet, Refills: 0      acetaminophen (TYLENOL) 325 MG tablet Take 1 tablet by mouth every 6 hours as needed for Pain  Qty: 10 tablet, Refills: 0      benzonatate (TESSALON PERLES) 100 MG capsule Take 1 capsule by mouth 3 times daily as needed for Cough  Qty: 10 capsule, Refills: 0      carvedilol (COREG) 6.25 MG tablet Take 1 tablet by mouth 2 times daily (with meals)  Qty: 60 tablet, Refills: 3      ferrous sulfate (MELISSA-HAO) 325 (65 Fe) MG tablet Take 1 tablet by mouth daily  Qty: 30 tablet, Refills: 0      melatonin 1 MG tablet Take 1 tablet by mouth nightly as needed for Sleep  Qty: 20 tablet, Refills: 0      ipratropium-albuterol (DUONEB) 0.5-2.5 (3) MG/3ML SOLN nebulizer solution Inhale 3 mLs into the lungs every 4 hours as needed for Shortness of Breath  Qty: 360 mL, Refills: 5      mometasone-formoterol (DULERA) 100-5 MCG/ACT inhaler Inhale 2 puffs into the lungs 2 times daily  Qty: 1 Inhaler, Refills: 5      pantoprazole (PROTONIX) 40 MG tablet Take 1 tablet by mouth 2 times daily (before meals)  Qty: 30 tablet, Refills: 3      folic acid (FOLVITE) 1 MG tablet Take 1 tablet by mouth daily  Qty: 30 tablet, Refills: 3      vitamin B-1 100 MG tablet Take 1 tablet by mouth daily  Qty: 30 tablet, Refills: 3      vitamin B-12 250 MCG tablet Take 1 tablet by mouth daily  Qty: 30 tablet, Refills: 3      gabapentin (NEURONTIN) 300 MG capsule Take 300 mg by mouth 3 times daily. .      Multiple Vitamins-Minerals (THERAPEUTIC MULTIVITAMIN-MINERALS) tablet Take 1 tablet by mouth daily  Qty: 30 tablet, Refills: 3             ALLERGIES     is allergic to aspirin and keflet [cephalexin]. FAMILY HISTORY     She indicated that her mother is . She indicated that her father is . family history includes Breast Cancer in her mother; Heart Disease in her father. SOCIAL HISTORY      reports that she quit smoking about 15 months ago. Her smoking use included cigarettes.  She started smoking about 50 years ago. She has a 40.00 pack-year smoking history. She has never used smokeless tobacco. She reports that she does not drink alcohol or use drugs. PHYSICAL EXAM     INITIAL VITALS:  oral temperature is 97.2 °F (36.2 °C). Her blood pressure is 147/91 (abnormal) and her pulse is 89. Her respiration is 16 and oxygen saturation is 99%. Physical Exam  Constitutional:       Appearance: Normal appearance. Cardiovascular:      Rate and Rhythm: Normal rate and regular rhythm. Pulses: Normal pulses. Heart sounds: Normal heart sounds. No murmur. No friction rub. No gallop. Pulmonary:      Effort: Pulmonary effort is normal. No respiratory distress. Breath sounds: Normal breath sounds. No stridor. No wheezing, rhonchi or rales. Chest:      Chest wall: No tenderness. Abdominal:      General: Abdomen is flat. Bowel sounds are normal. There is no distension. Palpations: Abdomen is soft. There is no mass. Tenderness: There is no abdominal tenderness. There is no guarding or rebound. Hernia: No hernia is present. Neurological:      Mental Status: She is oriented to person, place, and time. DIFFERENTIAL DIAGNOSIS/MDM:     Headache     DIAGNOSTIC RESULTS     EKG: All EKG's are interpreted by the Emergency Department Physician who either signs or Co-signs this chart in the absence of a cardiologist.        RADIOLOGY:   I directly visualized the following  images and reviewed the radiologist interpretations:  CT HEAD WO CONTRAST   Final Result   No acute intracranial abnormality. Stable chronic small vessel white matter ischemic changes. Left basal   ganglia lacunar infarct.              LABS:  Labs Reviewed - No data to display        EMERGENCY DEPARTMENT COURSE:   Vitals:    Vitals:    02/20/20 1609   BP: (!) 147/91   Pulse: 89   Resp: 16   Temp: 97.2 °F (36.2 °C)   TempSrc: Oral   SpO2: 99%         CONSULTS:  None      PROCEDURES:  Procedures

## 2020-02-25 ENCOUNTER — APPOINTMENT (OUTPATIENT)
Dept: CT IMAGING | Age: 65
End: 2020-02-25
Payer: MEDICARE

## 2020-02-25 ENCOUNTER — HOSPITAL ENCOUNTER (EMERGENCY)
Age: 65
Discharge: HOME | End: 2020-02-25
Attending: EMERGENCY MEDICINE
Payer: MEDICARE

## 2020-02-25 ENCOUNTER — APPOINTMENT (OUTPATIENT)
Dept: GENERAL RADIOLOGY | Age: 65
End: 2020-02-25
Payer: MEDICARE

## 2020-02-25 VITALS
OXYGEN SATURATION: 95 % | WEIGHT: 101 LBS | HEIGHT: 59 IN | SYSTOLIC BLOOD PRESSURE: 169 MMHG | BODY MASS INDEX: 20.36 KG/M2 | RESPIRATION RATE: 19 BRPM | HEART RATE: 79 BPM | DIASTOLIC BLOOD PRESSURE: 96 MMHG | TEMPERATURE: 98 F

## 2020-02-25 LAB
ABSOLUTE EOS #: 0.04 K/UL (ref 0–0.44)
ABSOLUTE IMMATURE GRANULOCYTE: 0.05 K/UL (ref 0–0.3)
ABSOLUTE LYMPH #: 1.59 K/UL (ref 1.1–3.7)
ABSOLUTE MONO #: 1 K/UL (ref 0.1–1.2)
ANION GAP SERPL CALCULATED.3IONS-SCNC: 15 MMOL/L (ref 9–17)
BASOPHILS # BLD: 1 % (ref 0–2)
BASOPHILS ABSOLUTE: 0.03 K/UL (ref 0–0.2)
BNP INTERPRETATION: NORMAL
BUN BLDV-MCNC: 8 MG/DL (ref 8–23)
BUN/CREAT BLD: NORMAL (ref 9–20)
CALCIUM SERPL-MCNC: 9.9 MG/DL (ref 8.6–10.4)
CHLORIDE BLD-SCNC: 106 MMOL/L (ref 98–107)
CO2: 20 MMOL/L (ref 20–31)
CREAT SERPL-MCNC: 0.66 MG/DL (ref 0.5–0.9)
DIFFERENTIAL TYPE: ABNORMAL
EOSINOPHILS RELATIVE PERCENT: 1 % (ref 1–4)
GFR AFRICAN AMERICAN: >60 ML/MIN
GFR NON-AFRICAN AMERICAN: >60 ML/MIN
GFR SERPL CREATININE-BSD FRML MDRD: NORMAL ML/MIN/{1.73_M2}
GFR SERPL CREATININE-BSD FRML MDRD: NORMAL ML/MIN/{1.73_M2}
GLUCOSE BLD-MCNC: 73 MG/DL (ref 70–99)
HCT VFR BLD CALC: 46.6 % (ref 36.3–47.1)
HEMOGLOBIN: 13.8 G/DL (ref 11.9–15.1)
IMMATURE GRANULOCYTES: 1 %
LYMPHOCYTES # BLD: 28 % (ref 24–43)
MAGNESIUM: 1.9 MG/DL (ref 1.6–2.6)
MCH RBC QN AUTO: 27.4 PG (ref 25.2–33.5)
MCHC RBC AUTO-ENTMCNC: 29.6 G/DL (ref 28.4–34.8)
MCV RBC AUTO: 92.6 FL (ref 82.6–102.9)
MONOCYTES # BLD: 18 % (ref 3–12)
NRBC AUTOMATED: 0 PER 100 WBC
PDW BLD-RTO: 18.6 % (ref 11.8–14.4)
PLATELET # BLD: 424 K/UL (ref 138–453)
PLATELET ESTIMATE: ABNORMAL
PMV BLD AUTO: 9.4 FL (ref 8.1–13.5)
POTASSIUM SERPL-SCNC: 4.2 MMOL/L (ref 3.7–5.3)
PRO-BNP: 205 PG/ML
RBC # BLD: 5.03 M/UL (ref 3.95–5.11)
RBC # BLD: ABNORMAL 10*6/UL
SEG NEUTROPHILS: 51 % (ref 36–65)
SEGMENTED NEUTROPHILS ABSOLUTE COUNT: 2.95 K/UL (ref 1.5–8.1)
SODIUM BLD-SCNC: 141 MMOL/L (ref 135–144)
THYROXINE, FREE: 1.41 NG/DL (ref 0.93–1.7)
TROPONIN INTERP: NORMAL
TROPONIN T: NORMAL NG/ML
TROPONIN, HIGH SENSITIVITY: 11 NG/L (ref 0–14)
TSH SERPL DL<=0.05 MIU/L-ACNC: 1.56 MIU/L (ref 0.3–5)
WBC # BLD: 5.7 K/UL (ref 3.5–11.3)
WBC # BLD: ABNORMAL 10*3/UL

## 2020-02-25 PROCEDURE — 6360000002 HC RX W HCPCS: Performed by: EMERGENCY MEDICINE

## 2020-02-25 PROCEDURE — 99285 EMERGENCY DEPT VISIT HI MDM: CPT

## 2020-02-25 PROCEDURE — 83880 ASSAY OF NATRIURETIC PEPTIDE: CPT

## 2020-02-25 PROCEDURE — 83735 ASSAY OF MAGNESIUM: CPT

## 2020-02-25 PROCEDURE — 85025 COMPLETE CBC W/AUTO DIFF WBC: CPT

## 2020-02-25 PROCEDURE — 71046 X-RAY EXAM CHEST 2 VIEWS: CPT

## 2020-02-25 PROCEDURE — 84439 ASSAY OF FREE THYROXINE: CPT

## 2020-02-25 PROCEDURE — 80048 BASIC METABOLIC PNL TOTAL CA: CPT

## 2020-02-25 PROCEDURE — 93005 ELECTROCARDIOGRAM TRACING: CPT | Performed by: EMERGENCY MEDICINE

## 2020-02-25 PROCEDURE — 84443 ASSAY THYROID STIM HORMONE: CPT

## 2020-02-25 PROCEDURE — 96374 THER/PROPH/DIAG INJ IV PUSH: CPT

## 2020-02-25 PROCEDURE — 84484 ASSAY OF TROPONIN QUANT: CPT

## 2020-02-25 PROCEDURE — 70450 CT HEAD/BRAIN W/O DYE: CPT

## 2020-02-25 RX ORDER — PROCHLORPERAZINE EDISYLATE 5 MG/ML
10 INJECTION INTRAMUSCULAR; INTRAVENOUS ONCE
Status: COMPLETED | OUTPATIENT
Start: 2020-02-25 | End: 2020-02-25

## 2020-02-25 RX ORDER — DIPHENHYDRAMINE HYDROCHLORIDE 50 MG/ML
12.5 INJECTION INTRAMUSCULAR; INTRAVENOUS ONCE
Status: COMPLETED | OUTPATIENT
Start: 2020-02-25 | End: 2020-02-25

## 2020-02-25 RX ADMIN — PROCHLORPERAZINE EDISYLATE 10 MG: 5 INJECTION INTRAMUSCULAR; INTRAVENOUS at 05:10

## 2020-02-25 RX ADMIN — DIPHENHYDRAMINE HYDROCHLORIDE 12.5 MG: 50 INJECTION, SOLUTION INTRAMUSCULAR; INTRAVENOUS at 05:10

## 2020-02-25 ASSESSMENT — ENCOUNTER SYMPTOMS
VOMITING: 0
SHORTNESS OF BREATH: 1
WHEEZING: 0
EYE DISCHARGE: 0
BACK PAIN: 0
ABDOMINAL PAIN: 0
CONSTIPATION: 0
NAUSEA: 0
COLOR CHANGE: 0
CHEST TIGHTNESS: 1
STRIDOR: 0
COUGH: 0
DIARRHEA: 0

## 2020-02-25 NOTE — ED NOTES
Bed: 18  Expected date: 2/25/20  Expected time: 4:26 AM  Means of arrival: [facility]  Comments:  Medic 3  HA x1 week  TIA 1 week ago     Orquidea Rothman RN  02/25/20 3400 yes

## 2020-02-25 NOTE — ED PROVIDER NOTES
acute abnormality of the visualized skull or soft tissues. No acute intracranial abnormality. Chronic microvascular ischemic changes. Ct Head Wo Contrast    Result Date: 2/20/2020  EXAMINATION: CT OF THE HEAD WITHOUT CONTRAST  2/20/2020 5:18 pm TECHNIQUE: CT of the head was performed without the administration of intravenous contrast. Dose modulation, iterative reconstruction, and/or weight based adjustment of the mA/kV was utilized to reduce the radiation dose to as low as reasonably achievable. COMPARISON: 01/30/2020. HISTORY: ORDERING SYSTEM PROVIDED HISTORY: headache TECHNOLOGIST PROVIDED HISTORY: headache FINDINGS: BRAIN/VENTRICLES: There is no acute intracranial hemorrhage, mass effect or midline shift. No abnormal extra-axial fluid collection. The gray-white differentiation is maintained without evidence of an acute infarct. There is no evidence of hydrocephalus. Decreased attenuation in the periventricular and subcortical white matter consistent with small vessel ischemic changes. Stable lacunar infarct in the left basal ganglia. Intracranial atherosclerotic vascular calcification. ORBITS: The visualized portion of the orbits demonstrate no acute abnormality. SINUSES: The visualized paranasal sinuses and mastoid air cells demonstrate no acute abnormality. SOFT TISSUES/SKULL:  No acute abnormality of the visualized skull or soft tissues. No acute intracranial abnormality. Stable chronic small vessel white matter ischemic changes. Left basal ganglia lacunar infarct. Ct Head Wo Contrast    Result Date: 1/31/2020  EXAMINATION: CT OF THE HEAD WITHOUT CONTRAST  1/30/2020 3:10 pm TECHNIQUE: CT of the head was performed without the administration of intravenous contrast. Dose modulation, iterative reconstruction, and/or weight based adjustment of the mA/kV was utilized to reduce the radiation dose to as low as reasonably achievable.  COMPARISON: MRI brain 01/27/2020, CT 01/26/2020 HISTORY:

## 2020-02-25 NOTE — ED NOTES
Scheduled pt transportation home to listed address with [employer], TRIP #: [id number]. ETA unknown, 30 minutes to 3 hours per representative.       [facility]  02/25/20 1002

## 2020-02-25 NOTE — ED TRIAGE NOTES
Pt to room 18 per ems with reports of headache x 1-2 weeks with generalized weakness, pt was seen for TIA symptoms about 2 weeks ago, states that she has not been taking antihypertensives, pt states that she lives with sister, pt is photosensitive, denies nausea, states intermittent chest pain, denies shortness of breath, denies numbness or tingling, triage completed, Dr Aaron Trinidad at bedside Spoke with Alok in MRI. They will sent pt to inpatient room P382 when MRI complete since room is clean.     Summer Schroeder RN  08/14/19 9731

## 2020-02-25 NOTE — ED PROVIDER NOTES
[facility]  ED  Emergency Department Encounter  EmergencyMedicine Resident     Pt Name:Emi Zambrano  MRN: [id number]  Birthdate 1955  Date of evaluation: 20  PCP:  No primary care provider on file. CHIEF COMPLAINT       Chief Complaint   Patient presents with    Headache       HISTORY OF PRESENT ILLNESS  (Location/Symptom, Timing/Onset, Context/Setting, Quality, Duration, Modifying Factors, Severity.)      Emi Daniels is a 75 y.o. female who presents with headache, generalized weakness. Patient states that the headache is been persistent for 1 week. States that it is persistent, evaluated 4 days prior to arrival with similar symptoms. Compazine, Benadryl given. CT head at that time showed stable chronic small vessel ischemic changes in left basal ganglia lucunar infarct without acute intracranial abnormality. Subsequently discharged. Patient states symptoms unchanged from prior evaluation. Reported medication noncompliance with antihypertensives. No focal neurological deficits noted on recent exam 5 days prior to arrival.    PAST MEDICAL / SURGICAL / SOCIAL / FAMILY HISTORY      has a past medical history of Appendicitis, Cerebral artery occlusion with cerebral infarction ([address] CHF (congestive heart failure) (HCC), Chronic respiratory failure with hypoxia and hypercapnia (HCC), Chronic rhinitis, Cigarette smoker, COPD (chronic obstructive pulmonary disease) ([address] Depression, Dysphagia, Essential hypertension, GERD (gastroesophageal reflux disease), Heart failure, diastolic, with acute decompensation ([address] Hepatitis C, chronic (HCC), History of smoking at least 1 pack per day for at least 30 years, Hyperlipidemia, Hypertension, Migraine, Moderate COPD (chronic obstructive pulmonary disease) ([address] Multiple transfusions, Neurogenic dysphagia, Osteoarthritis, Pneumonia, Scoliosis, and Substance abuse ([address]      has a past surgical history that includes  section; LEEP (); Upper gastrointestinal endoscopy (); Gastric bypass surgery; Appendectomy (2017); laparoscopic appendectomy (N/A, 3/27/2017); Upper gastrointestinal endoscopy (2018); Upper gastrointestinal endoscopy (N/A, 2018); pr colonoscopy w/biopsy single/multiple (N/A, 11/10/2018); pr egd transoral biopsy single/multiple (N/A, 2018); hip surgery (Right, 2019); and Total hip arthroplasty (Right, 2019).     Social History     Socioeconomic History    Marital status: Single     Spouse name: Not on file    Number of children: Not on file    Years of education: Not on file    Highest education level: Not on file   Occupational History    Not on file   Social Needs    Financial resource strain: Not on file    Food insecurity:     Worry: Not on file     Inability: Not on file    Transportation needs:     Medical: Not on file     Non-medical: Not on file   Tobacco Use    Smoking status: Former Smoker     Packs/day: 1.00     Years: 40.00     Pack years: 40.00     Types: Cigarettes     Start date:      Last attempt to quit: 2018     Years since quittin.3    Smokeless tobacco: Never Used   Substance and Sexual Activity    Alcohol use: No     Alcohol/week: 0.0 standard drinks    Drug use: No     Types: IV, Cocaine     Comment: past history    Sexual activity: Not on file   Lifestyle    Physical activity:     Days per week: Not on file     Minutes per session: Not on file    Stress: Not on file   Relationships    Social connections:     Talks on phone: Not on file     Gets together: Not on file     Attends Hindu service: Not on file     Active member of club or organization: Not on file     Attends meetings of clubs or organizations: Not on file     Relationship status: Not on file    Intimate partner violence:     Fear of current or ex partner: Not on file     Emotionally abused: Not on file     Physically abused: Not on file     Forced sexual activity: Not on file   Other Topics Concern    Not on file   Social History Narrative    Not on file       Family History   Problem Relation Age of Onset    Breast Cancer Mother     Heart Disease Father        Allergies:  Aspirin and Keflet [cephalexin]    Home Medications:  Prior to Admission medications    Medication Sig Start Date End Date Taking? Authorizing Provider   amitriptyline (ELAVIL) 10 MG tablet Take 1 tablet by mouth nightly 1/31/20   Dena Foster DO   atorvastatin (LIPITOR) 40 MG tablet Take 1 tablet by mouth nightly 1/31/20   Dena Foster DO   butalbital-acetaminophen-caffeine (FIORICET, ESGIC) -40 MG per tablet Take 1 tablet by mouth every 4 hours as needed for Headaches 1/31/20   Dena Foster DO   clopidogrel (PLAVIX) 75 MG tablet Take 1 tablet by mouth daily 2/1/20   Annel Andino DO   albuterol sulfate HFA (PROVENTIL HFA) 108 (90 Base) MCG/ACT inhaler Inhale 1-2 puffs into the lungs once for 1 dose 1/9/20 1/9/20  Stephie Gomez MD   ondansetron (ZOFRAN ODT) 4 MG disintegrating tablet Take 1 tablet by mouth every 8 hours as needed for Nausea 9/9/19   Tila Chappell PA-C   docusate sodium (COLACE, DULCOLAX) 100 MG CAPS Take 100 mg by mouth 2 times daily 8/22/19   Jennifer Gonzalez MD   [facility].  Devices [facility]) MISC 1 Device by Does not apply route once for 1 dose 8/5/19 8/5/19  Tila Chappell PA-C   hydrOXYzine (ATARAX) 25 MG tablet Take 1 tablet by mouth every 8 hours as needed for Itching 8/5/19   Tila Chappell PA-C   acetaminophen (TYLENOL) 325 MG tablet Take 1 tablet by mouth every 6 hours as needed for Pain 6/22/19   Cee Oats, DO   benzonatate (TESSALON PERLES) 100 MG capsule Take 1 capsule by mouth 3 times daily as needed for Cough 3/11/19   Sweta Lopez,    carvedilol (COREG) 6.25 MG tablet Take 1 tablet by mouth 2 times daily (with meals) 2/7/19   Staci Garza MD   ferrous sulfate (MELISSA-HAO) 325 (65 Fe) MG tablet Take 1 tablet by mouth daily 2/7/19   Jayla Bowden MD Anurag   melatonin 1 MG tablet Take 1 tablet by mouth nightly as needed for Sleep 1/11/19   Cinthia Barrett MD   ipratropium-albuterol (DUONEB) 0.5-2.5 (3) MG/3ML SOLN nebulizer solution Inhale 3 mLs into the lungs every 4 hours as needed for Shortness of Breath 1/11/19   Cinthia Barrett MD   mometasone-formoterol [facility]) 100-5 MCG/ACT inhaler Inhale 2 puffs into the lungs 2 times daily 12/5/18   Donta Lobato MD   pantoprazole (PROTONIX) 40 MG tablet Take 1 tablet by mouth 2 times daily (before meals) 11/13/18   Valentino Johnson MD   folic acid (FOLVITE) 1 MG tablet Take 1 tablet by mouth daily 10/13/18   Josefa Yeung MD   vitamin B-1 100 MG tablet Take 1 tablet by mouth daily 10/13/18   Josefa Yeung MD   vitamin B-12 250 MCG tablet Take 1 tablet by mouth daily 10/13/18   Josefa Yeung MD   gabapentin (NEURONTIN) 300 MG capsule Take 300 mg by mouth 3 times daily. Marcia Fofana Historical MD Marnie   Multiple Vitamins-Minerals (THERAPEUTIC MULTIVITAMIN-MINERALS) tablet Take 1 tablet by mouth daily 8/12/18   Quinton Bolivar MD       REVIEW OF SYSTEMS    (2-9 systems for level 4, 10 or more for level 5)    ROS limited due to pt poor historian  Review of Systems   Constitutional: Positive for appetite change and fatigue. Eyes: Negative for discharge and visual disturbance. Respiratory: Positive for chest tightness and shortness of breath. Negative for cough, wheezing and stridor. Cardiovascular: Positive for chest pain. Negative for palpitations and leg swelling. Gastrointestinal: Negative for abdominal pain, constipation, diarrhea, nausea and vomiting. Endocrine: Negative for polyuria. Genitourinary: Negative for difficulty urinating and hematuria. Musculoskeletal: Positive for arthralgias, gait problem and myalgias. Negative for back pain, neck pain and neck stiffness. Skin: Negative for color change, rash and wound. Neurological: Positive for weakness and headaches.  Negative for dizziness and numbness. PHYSICAL EXAM   (up to 7 for level 4, 8 or more for level 5)      INITIAL VITALS:   BP (!) 169/96   Pulse 79   Temp 98 °F (36.7 °C) (Oral)   Resp 19   Ht 4' 11\" (1.499 m)   Wt 101 lb (45.8 kg)   LMP 03/18/2015   SpO2 95%   BMI 20.40 kg/m²     Physical Exam  Constitutional:       General: She is not in acute distress. Appearance: She is well-developed. She is not diaphoretic. HENT:      Head: Normocephalic and atraumatic. Right Ear: External ear normal.      Left Ear: External ear normal.      Nose: Nose normal.   Eyes:      General:         Right eye: No discharge. Left eye: No discharge. Conjunctiva/sclera: Conjunctivae normal.   Neck:      Musculoskeletal: Normal range of motion and neck supple. Vascular: No JVD. Cardiovascular:      Rate and Rhythm: Normal rate and regular rhythm. Heart sounds: Normal heart sounds. No murmur. No friction rub. No gallop. Pulmonary:      Effort: Pulmonary effort is normal. No respiratory distress. Breath sounds: No stridor. Decreased breath sounds present. No wheezing or rhonchi. Abdominal:      General: Bowel sounds are normal. There is no distension. Palpations: Abdomen is soft. Tenderness: There is no abdominal tenderness. Musculoskeletal: Normal range of motion. General: No tenderness or deformity. Skin:     General: Skin is warm and dry. Findings: No erythema or rash. Neurological:      Mental Status: She is alert and oriented to person, place, and time. Motor: No abnormal muscle tone. Comments: Awake, alert, oriented x3. No facial asymmetry. Sensation of face, upper, lower extremities intact and equal bilaterally. No cranial nerve deficits. No dysmetria.  strength, flexion/extension of upper extremities 5/5 equal bilaterally. Dorsi/plantarflexion, hip flexion 4/5 equal bilaterally. No pronator drift of upper extremities.          DIFFERENTIAL  DIAGNOSIS %    Monocytes 18 (H) 3 - 12 %    Eosinophils % 1 1 - 4 %    Basophils 1 0 - 2 %    Immature Granulocytes 1 (H) 0 %    Segs Absolute 2.95 1.50 - 8.10 k/uL    Absolute Lymph # 1.59 1.10 - 3.70 k/uL    Absolute Mono # 1.00 0.10 - 1.20 k/uL    Absolute Eos # 0.04 0.00 - 0.44 k/uL    Basophils Absolute 0.03 0.00 - 0.20 k/uL    Absolute Immature Granulocyte 0.05 0.00 - 0.30 k/uL    WBC Morphology NOT REPORTED     RBC Morphology ANISOCYTOSIS PRESENT     Platelet Estimate NOT REPORTED    T4, Free   Result Value Ref Range    Thyroxine, Free 1.41 0.93 - 1.70 ng/dL   TSH without Reflex   Result Value Ref Range    TSH 1.56 0.30 - 5.00 mIU/L   Troponin   Result Value Ref Range    Troponin, High Sensitivity 11 0 - 14 ng/L    Troponin T NOT REPORTED <0.03 ng/mL    Troponin Interp NOT REPORTED    Magnesium   Result Value Ref Range    Magnesium 1.9 1.6 - 2.6 mg/dL   EKG 12 Lead   Result Value Ref Range    Ventricular Rate 87 BPM    Atrial Rate 87 BPM    P-R Interval 152 ms    QRS Duration 70 ms    Q-T Interval 376 ms    QTc Calculation (Bazett) 452 ms    P Axis 67 degrees    R Axis 43 degrees    T Axis 83 degrees       IMPRESSION: 20-year-old female presents to ED with headache, fatigue, generalized weakness. Seen and evaluated this ED for similar symptoms. EKG without concerning abnormalities. Troponin negative. Lab work-up without concerning findings. Chest x-ray without acute cardiopulmonary abnormalities. CT head without evidence of acute bleed. Interval improvement in headache following medications provided. RADIOLOGY:    Xr Chest Standard (2 Vw)    Result Date: 2/25/2020  EXAMINATION: TWO XRAY VIEWS OF THE CHEST 2/25/2020 5:03 am COMPARISON: None. HISTORY: ORDERING SYSTEM PROVIDED HISTORY: SOB, chest pain TECHNOLOGIST PROVIDED HISTORY: SOB, chest pain Reason for Exam: chest pain Acuity: Unknown Type of Exam: Unknown FINDINGS: The cardiac silhouette is within normal limits for size.  The pulmonary vasculature is within normal limits. There is no focal consolidation, pleural effusion or pneumothorax. The visualized osseous structures demonstrate no acute abnormality. No acute cardiopulmonary abnormality. Ct Head Wo Contrast    Result Date: 2/25/2020  EXAMINATION: CT OF THE HEAD WITHOUT CONTRAST  2/25/2020 5:44 am TECHNIQUE: CT of the head was performed without the administration of intravenous contrast. Dose modulation, iterative reconstruction, and/or weight based adjustment of the mA/kV was utilized to reduce the radiation dose to as low as reasonably achievable. COMPARISON: None. HISTORY: ORDERING SYSTEM PROVIDED HISTORY: cephalgia TECHNOLOGIST PROVIDED HISTORY: cephalgia Reason for Exam: cephalgia Acuity: Unknown Type of Exam: Unknown FINDINGS: BRAIN/VENTRICLES: There is no acute intracranial hemorrhage, mass effect or midline shift. No abnormal extra-axial fluid collection. There are areas of hypoattenuation in the periventricular and subcortical white matter, which is nonspecific, but may represent chronic microvasvular ischemic change. Left basal ganglia lacunar infarct. The gray-white differentiation is maintained without evidence of an acute infarct. There is no evidence of hydrocephalus. ORBITS: The visualized portion of the orbits demonstrate no acute abnormality. SINUSES: The visualized paranasal sinuses and mastoid air cells demonstrate no acute abnormality. There is mucosal thickening in the floors of maxillary antrum. SOFT TISSUES/SKULL:  No acute abnormality of the visualized skull or soft tissues. No acute intracranial abnormality. Chronic microvascular ischemic changes.      EKG    EKG Interpretation    Interpreted by emergency department physician    Rhythm: normal sinus   Rate: normal  Axis: normal  Ectopy: none  Conduction: normal, poor R wave progression  ST Segments: no acute change  T Waves: non specific changes  Q Waves: nonspecific    Clinical Impression: non-specific EKG normal improvement of inverted T wave compared to 1/26/2020    Chelsey Bear      All EKG's are interpreted by the Emergency Department Physician who either signs or Co-signs this chart in the absence of a cardiologist.    EMERGENCY DEPARTMENT COURSE:    49-year-old female presents to ED with persistent generalized weakness, headache, patient poor historian and unable to provide timeline of onset or frequency of symptoms. Prior history of TIA, patient seen and evaluated this ED recently for similar complaints. Reported medication noncompliance. Vital signs on arrival remarkable for mildly elevated blood pressure. Patient afebrile. Patient awake, alert, oriented x3. Mild weakness of lower extremities. Sensation intact bilaterally. No focal neurological deficits. EKG, troponin ordered due to reported intermittent chest pain, chest x-ray ordered due to shortness of breath. CBC, BMP ordered. ED Course as of Feb 25 0848   Tue Feb 25, 2020   0546 Initial troponin 11. Thyroid labs within normal limits. BMP shows no evidence of acute kidney abnormality. No concerning electrolyte abnormalities. CBC negative for leukocytosis, anemia. Compazine, Benadryl given for migraine headache. Will proceed with CT head to rule out acute intracranial abnormality. Patient stable at this time. [RB]   0601 Patient reporting interval improvement in headache following medications provided. No new or worsening symptoms. Awaiting CT head results at this time. Chest x-ray negative for acute cardiopulmonary abnormalities. We will continue to monitor and treat. [RB]      ED Course User Index  [RB] Chelsey Bear MD     CT head negative. Patient awakes to verbal stimuli, however somnolent after receiving benadryl/compazine. Patient reporting no new or worsening symptoms. We will continue to monitor until patient has increased level of alertness. Stable at this time. Patient resting comfortably.   At the end of my shift

## 2020-02-26 PROBLEM — R05.9 COUGH: Status: RESOLVED | Noted: 2020-01-27 | Resolved: 2020-02-26

## 2020-02-26 LAB
EKG ATRIAL RATE: 87 BPM
EKG P AXIS: 67 DEGREES
EKG P-R INTERVAL: 152 MS
EKG Q-T INTERVAL: 376 MS
EKG QRS DURATION: 70 MS
EKG QTC CALCULATION (BAZETT): 452 MS
EKG R AXIS: 43 DEGREES
EKG T AXIS: 83 DEGREES
EKG VENTRICULAR RATE: 87 BPM

## 2020-02-26 PROCEDURE — 93010 ELECTROCARDIOGRAM REPORT: CPT | Performed by: INTERNAL MEDICINE

## 2020-04-01 ENCOUNTER — TELEPHONE (OUTPATIENT)
Dept: ORTHOPEDIC SURGERY | Age: 65
End: 2020-04-01

## 2020-04-01 NOTE — TELEPHONE ENCOUNTER
Patient called regarding her right hip. She had a right total hip arthroplasty on 8/13/2019. Patient said over the last week her  Right hip has increased in pain. Patient has to take a seat after only a couple steps due to the pain. Patient says the pain is over the right hip and does not effect the groin, thigh, or buttock. Patient denies and falls or twisting events. Patient ambulates with walker normally. Patient is not taking anything for pain. Is there any advice we can give her? Thanks.

## 2020-04-06 ENCOUNTER — OFFICE VISIT (OUTPATIENT)
Dept: ORTHOPEDIC SURGERY | Age: 65
End: 2020-04-06
Payer: MEDICARE

## 2020-04-06 VITALS — WEIGHT: 100.97 LBS | HEIGHT: 59 IN | BODY MASS INDEX: 20.36 KG/M2

## 2020-04-06 PROCEDURE — G8400 PT W/DXA NO RESULTS DOC: HCPCS | Performed by: STUDENT IN AN ORGANIZED HEALTH CARE EDUCATION/TRAINING PROGRAM

## 2020-04-06 PROCEDURE — G8420 CALC BMI NORM PARAMETERS: HCPCS | Performed by: STUDENT IN AN ORGANIZED HEALTH CARE EDUCATION/TRAINING PROGRAM

## 2020-04-06 PROCEDURE — 3017F COLORECTAL CA SCREEN DOC REV: CPT | Performed by: STUDENT IN AN ORGANIZED HEALTH CARE EDUCATION/TRAINING PROGRAM

## 2020-04-06 PROCEDURE — 99213 OFFICE O/P EST LOW 20 MIN: CPT | Performed by: STUDENT IN AN ORGANIZED HEALTH CARE EDUCATION/TRAINING PROGRAM

## 2020-04-06 PROCEDURE — 1036F TOBACCO NON-USER: CPT | Performed by: STUDENT IN AN ORGANIZED HEALTH CARE EDUCATION/TRAINING PROGRAM

## 2020-04-06 PROCEDURE — G8427 DOCREV CUR MEDS BY ELIG CLIN: HCPCS | Performed by: STUDENT IN AN ORGANIZED HEALTH CARE EDUCATION/TRAINING PROGRAM

## 2020-04-06 PROCEDURE — 1090F PRES/ABSN URINE INCON ASSESS: CPT | Performed by: STUDENT IN AN ORGANIZED HEALTH CARE EDUCATION/TRAINING PROGRAM

## 2020-04-06 PROCEDURE — 4040F PNEUMOC VAC/ADMIN/RCVD: CPT | Performed by: STUDENT IN AN ORGANIZED HEALTH CARE EDUCATION/TRAINING PROGRAM

## 2020-04-06 PROCEDURE — 1123F ACP DISCUSS/DSCN MKR DOCD: CPT | Performed by: STUDENT IN AN ORGANIZED HEALTH CARE EDUCATION/TRAINING PROGRAM

## 2020-04-06 RX ORDER — METHYLPREDNISOLONE 4 MG/1
TABLET ORAL
Qty: 1 KIT | Refills: 0 | Status: SHIPPED | OUTPATIENT
Start: 2020-04-06 | End: 2020-04-12

## 2020-04-06 NOTE — PROGRESS NOTES
[facility]  [facility]  [address] [place] [id number]  Dept: [phone]  Dept Fax: [phone]        Ambulatory Follow Up    Subjective:   Emi Dickinson is a 75y.o. year old female who presents to our office today for routine followup regarding her   1. Greater trochanteric bursitis of right hip    . Chief Complaint   Patient presents with    Hip Pain       HPI   64F presents for requested visit due to increasing right hip pain. Pt with hx of right BASILIO 8/13/19. Pt reports increased lateral right hip pain over the last week or so. Pt denies acute falls, trauma. Pain worse with ambulation, and at night. Pain worse when laying on her right side. Pt has not received any treatment so far, she is not taking NSAIDs. Pt is still using a rolling walker and feels week overall. Pt was instructed to start therapy when we last saw her 9/18/19, but she never went. Pt denies further complaints. Review of Systems  Negative except as seen in HPI    Objective :   General: Emi Daniels is a 75 y.o. female who is alert and oriented and sitting comfortably in our office. Ortho Exam  MS:  R hip: no swelling, ecchymosis, erythema. Significant TTP about greater trochanter. AROM flexion 90, IR 10 with pain to lateral hip, ER 45, adduction past midline with pain lateral hip. Negative log roll. Compartments soft. 2+ DP pulse. TA/EHL/FHL/GS motor intact. Deep and Superficial Peroneal/Saphenous/Sural SILT. Antalgic gait. Neuro: alert. oriented  Eyes: Extra-ocular muscles intact  Mouth: Oral mucosa moist. No perioral lesions  Pulm: Respirations unlabored and regular. Skin: warm, well perfused  Psych:   Patient has good fund of knowledge and displays understanging of exam, diagnosis, and plan.     Radiology:   History:   Right hip pain    Comparison:   9/18/19    Findings:   2 views of the right hip AP, lateral and AP pelvis demonstrates stable right total hip implant, no evidence of

## 2020-04-24 ENCOUNTER — APPOINTMENT (OUTPATIENT)
Dept: GENERAL RADIOLOGY | Age: 65
End: 2020-04-24
Payer: MEDICARE

## 2020-04-24 ENCOUNTER — HOSPITAL ENCOUNTER (EMERGENCY)
Age: 65
Discharge: LEFT BEFORE DISCHARGE | End: 2020-04-25
Attending: EMERGENCY MEDICINE | Admitting: FAMILY MEDICINE
Payer: MEDICARE

## 2020-04-24 VITALS
HEART RATE: 86 BPM | HEIGHT: 59 IN | BODY MASS INDEX: 22.38 KG/M2 | TEMPERATURE: 98.9 F | RESPIRATION RATE: 29 BRPM | DIASTOLIC BLOOD PRESSURE: 120 MMHG | SYSTOLIC BLOOD PRESSURE: 161 MMHG | OXYGEN SATURATION: 99 % | WEIGHT: 111 LBS

## 2020-04-24 LAB
ABSOLUTE EOS #: 0.14 K/UL (ref 0–0.44)
ABSOLUTE IMMATURE GRANULOCYTE: 0.14 K/UL (ref 0–0.3)
ABSOLUTE LYMPH #: 2.31 K/UL (ref 1.1–3.7)
ABSOLUTE MONO #: 0.71 K/UL (ref 0.1–1.2)
BASOPHILS # BLD: 0 % (ref 0–2)
BASOPHILS ABSOLUTE: 0.03 K/UL (ref 0–0.2)
DIFFERENTIAL TYPE: ABNORMAL
EOSINOPHILS RELATIVE PERCENT: 2 % (ref 1–4)
HCT VFR BLD CALC: 41.4 % (ref 36.3–47.1)
HEMOGLOBIN: 12.9 G/DL (ref 11.9–15.1)
IMMATURE GRANULOCYTES: 2 %
LYMPHOCYTES # BLD: 30 % (ref 24–43)
MCH RBC QN AUTO: 32.4 PG (ref 25.2–33.5)
MCHC RBC AUTO-ENTMCNC: 31.2 G/DL (ref 28.4–34.8)
MCV RBC AUTO: 104 FL (ref 82.6–102.9)
MONOCYTES # BLD: 9 % (ref 3–12)
NRBC AUTOMATED: 0 PER 100 WBC
PDW BLD-RTO: 19.1 % (ref 11.8–14.4)
PLATELET # BLD: 488 K/UL (ref 138–453)
PLATELET ESTIMATE: ABNORMAL
PMV BLD AUTO: 8.6 FL (ref 8.1–13.5)
RBC # BLD: 3.98 M/UL (ref 3.95–5.11)
RBC # BLD: ABNORMAL 10*6/UL
SEG NEUTROPHILS: 57 % (ref 36–65)
SEGMENTED NEUTROPHILS ABSOLUTE COUNT: 4.45 K/UL (ref 1.5–8.1)
WBC # BLD: 7.8 K/UL (ref 3.5–11.3)
WBC # BLD: ABNORMAL 10*3/UL

## 2020-04-24 PROCEDURE — 85025 COMPLETE CBC W/AUTO DIFF WBC: CPT

## 2020-04-24 PROCEDURE — 80048 BASIC METABOLIC PNL TOTAL CA: CPT

## 2020-04-24 PROCEDURE — 6370000000 HC RX 637 (ALT 250 FOR IP): Performed by: STUDENT IN AN ORGANIZED HEALTH CARE EDUCATION/TRAINING PROGRAM

## 2020-04-24 PROCEDURE — 93005 ELECTROCARDIOGRAM TRACING: CPT | Performed by: STUDENT IN AN ORGANIZED HEALTH CARE EDUCATION/TRAINING PROGRAM

## 2020-04-24 PROCEDURE — 71045 X-RAY EXAM CHEST 1 VIEW: CPT

## 2020-04-24 PROCEDURE — 99285 EMERGENCY DEPT VISIT HI MDM: CPT

## 2020-04-24 PROCEDURE — U0002 COVID-19 LAB TEST NON-CDC: HCPCS

## 2020-04-24 PROCEDURE — 84484 ASSAY OF TROPONIN QUANT: CPT

## 2020-04-24 PROCEDURE — 83880 ASSAY OF NATRIURETIC PEPTIDE: CPT

## 2020-04-24 RX ORDER — ASPIRIN 81 MG/1
324 TABLET, CHEWABLE ORAL ONCE
Status: DISCONTINUED | OUTPATIENT
Start: 2020-04-24 | End: 2020-04-24

## 2020-04-24 RX ORDER — LEVOFLOXACIN 750 MG/1
750 TABLET ORAL ONCE
Status: COMPLETED | OUTPATIENT
Start: 2020-04-24 | End: 2020-04-24

## 2020-04-24 RX ORDER — CLOPIDOGREL 300 MG/1
300 TABLET, FILM COATED ORAL ONCE
Status: COMPLETED | OUTPATIENT
Start: 2020-04-24 | End: 2020-04-24

## 2020-04-24 RX ADMIN — CLOPIDOGREL BISULFATE 300 MG: 300 TABLET, FILM COATED ORAL at 22:14

## 2020-04-24 RX ADMIN — LEVOFLOXACIN 750 MG: 750 TABLET, FILM COATED ORAL at 23:28

## 2020-04-24 ASSESSMENT — PAIN DESCRIPTION - PAIN TYPE: TYPE: ACUTE PAIN

## 2020-04-24 ASSESSMENT — PAIN DESCRIPTION - FREQUENCY: FREQUENCY: CONTINUOUS

## 2020-04-24 ASSESSMENT — PAIN DESCRIPTION - DESCRIPTORS: DESCRIPTORS: HEADACHE

## 2020-04-24 ASSESSMENT — PAIN SCALES - GENERAL: PAINLEVEL_OUTOF10: 8

## 2020-04-25 PROBLEM — R06.02 SOB (SHORTNESS OF BREATH): Status: ACTIVE | Noted: 2020-04-25

## 2020-04-25 LAB
ANION GAP SERPL CALCULATED.3IONS-SCNC: 14 MMOL/L (ref 9–17)
BNP INTERPRETATION: NORMAL
BUN BLDV-MCNC: 15 MG/DL (ref 8–23)
BUN/CREAT BLD: NORMAL (ref 9–20)
CALCIUM SERPL-MCNC: 9.1 MG/DL (ref 8.6–10.4)
CHLORIDE BLD-SCNC: 102 MMOL/L (ref 98–107)
CO2: 24 MMOL/L (ref 20–31)
CREAT SERPL-MCNC: 0.69 MG/DL (ref 0.5–0.9)
EKG ATRIAL RATE: 87 BPM
EKG P AXIS: 54 DEGREES
EKG P-R INTERVAL: 140 MS
EKG Q-T INTERVAL: 346 MS
EKG QRS DURATION: 58 MS
EKG QTC CALCULATION (BAZETT): 416 MS
EKG R AXIS: 43 DEGREES
EKG T AXIS: 82 DEGREES
EKG VENTRICULAR RATE: 87 BPM
GFR AFRICAN AMERICAN: >60 ML/MIN
GFR NON-AFRICAN AMERICAN: >60 ML/MIN
GFR SERPL CREATININE-BSD FRML MDRD: NORMAL ML/MIN/{1.73_M2}
GFR SERPL CREATININE-BSD FRML MDRD: NORMAL ML/MIN/{1.73_M2}
GLUCOSE BLD-MCNC: 83 MG/DL (ref 70–99)
POTASSIUM SERPL-SCNC: 4.3 MMOL/L (ref 3.7–5.3)
PRO-BNP: 297 PG/ML
SARS-COV-2, PCR: NORMAL
SARS-COV-2, RAPID: NOT DETECTED
SARS-COV-2: NOT DETECTED
SODIUM BLD-SCNC: 140 MMOL/L (ref 135–144)
SOURCE: NORMAL
TROPONIN INTERP: ABNORMAL
TROPONIN INTERP: ABNORMAL
TROPONIN T: ABNORMAL NG/ML
TROPONIN T: ABNORMAL NG/ML
TROPONIN, HIGH SENSITIVITY: 15 NG/L (ref 0–14)
TROPONIN, HIGH SENSITIVITY: 16 NG/L (ref 0–14)

## 2020-04-25 PROCEDURE — 84484 ASSAY OF TROPONIN QUANT: CPT

## 2020-04-25 RX ORDER — POTASSIUM CHLORIDE 7.45 MG/ML
10 INJECTION INTRAVENOUS PRN
Status: CANCELLED | OUTPATIENT
Start: 2020-04-25

## 2020-04-25 RX ORDER — ACETAMINOPHEN 650 MG/1
650 SUPPOSITORY RECTAL EVERY 6 HOURS PRN
Status: CANCELLED | OUTPATIENT
Start: 2020-04-25

## 2020-04-25 RX ORDER — CARVEDILOL 12.5 MG/1
6.25 TABLET ORAL 2 TIMES DAILY WITH MEALS
Status: CANCELLED | OUTPATIENT
Start: 2020-04-25

## 2020-04-25 RX ORDER — THIAMINE MONONITRATE (VIT B1) 100 MG
100 TABLET ORAL DAILY
Status: CANCELLED | OUTPATIENT
Start: 2020-04-25

## 2020-04-25 RX ORDER — CLOPIDOGREL BISULFATE 75 MG/1
75 TABLET ORAL DAILY
Status: CANCELLED | OUTPATIENT
Start: 2020-04-25

## 2020-04-25 RX ORDER — AMITRIPTYLINE HYDROCHLORIDE 10 MG/1
10 TABLET, FILM COATED ORAL NIGHTLY
Status: CANCELLED | OUTPATIENT
Start: 2020-04-25

## 2020-04-25 RX ORDER — FERROUS SULFATE 325(65) MG
325 TABLET ORAL DAILY
Status: CANCELLED | OUTPATIENT
Start: 2020-04-25

## 2020-04-25 RX ORDER — SODIUM CHLORIDE 0.9 % (FLUSH) 0.9 %
10 SYRINGE (ML) INJECTION EVERY 12 HOURS SCHEDULED
Status: CANCELLED | OUTPATIENT
Start: 2020-04-25

## 2020-04-25 RX ORDER — PROMETHAZINE HYDROCHLORIDE 25 MG/1
12.5 TABLET ORAL EVERY 6 HOURS PRN
Status: CANCELLED | OUTPATIENT
Start: 2020-04-25

## 2020-04-25 RX ORDER — PANTOPRAZOLE SODIUM 40 MG/1
40 TABLET, DELAYED RELEASE ORAL
Status: CANCELLED | OUTPATIENT
Start: 2020-04-25

## 2020-04-25 RX ORDER — HYDROXYZINE HYDROCHLORIDE 25 MG/1
25 TABLET, FILM COATED ORAL EVERY 8 HOURS PRN
Status: CANCELLED | OUTPATIENT
Start: 2020-04-25

## 2020-04-25 RX ORDER — ATORVASTATIN CALCIUM 80 MG/1
40 TABLET, FILM COATED ORAL NIGHTLY
Status: CANCELLED | OUTPATIENT
Start: 2020-04-25

## 2020-04-25 RX ORDER — MAGNESIUM SULFATE 1 G/100ML
1 INJECTION INTRAVENOUS PRN
Status: CANCELLED | OUTPATIENT
Start: 2020-04-25

## 2020-04-25 RX ORDER — BUDESONIDE AND FORMOTEROL FUMARATE DIHYDRATE 160; 4.5 UG/1; UG/1
2 AEROSOL RESPIRATORY (INHALATION) 2 TIMES DAILY
Status: CANCELLED | OUTPATIENT
Start: 2020-04-25

## 2020-04-25 RX ORDER — SODIUM CHLORIDE 0.9 % (FLUSH) 0.9 %
10 SYRINGE (ML) INJECTION PRN
Status: CANCELLED | OUTPATIENT
Start: 2020-04-25

## 2020-04-25 RX ORDER — FOLIC ACID 1 MG/1
1 TABLET ORAL DAILY
Status: CANCELLED | OUTPATIENT
Start: 2020-04-25

## 2020-04-25 RX ORDER — ONDANSETRON 2 MG/ML
4 INJECTION INTRAMUSCULAR; INTRAVENOUS EVERY 6 HOURS PRN
Status: CANCELLED | OUTPATIENT
Start: 2020-04-25

## 2020-04-25 RX ORDER — ACETAMINOPHEN 325 MG/1
650 TABLET ORAL EVERY 6 HOURS PRN
Status: CANCELLED | OUTPATIENT
Start: 2020-04-25

## 2020-04-25 RX ORDER — LEVOFLOXACIN 750 MG/1
750 TABLET ORAL DAILY
Qty: 5 TABLET | Refills: 0 | Status: SHIPPED | OUTPATIENT
Start: 2020-04-25 | End: 2020-04-30

## 2020-04-25 RX ORDER — PSEUDOEPHEDRINE HCL 30 MG
100 TABLET ORAL 2 TIMES DAILY
Status: CANCELLED | OUTPATIENT
Start: 2020-04-25

## 2020-04-25 RX ORDER — GABAPENTIN 300 MG/1
300 CAPSULE ORAL 3 TIMES DAILY
Status: CANCELLED | OUTPATIENT
Start: 2020-04-25

## 2020-04-25 RX ORDER — NITROGLYCERIN 0.4 MG/1
0.4 TABLET SUBLINGUAL EVERY 5 MIN PRN
Status: CANCELLED | OUTPATIENT
Start: 2020-04-25

## 2020-04-25 RX ORDER — CHOLECALCIFEROL (VITAMIN D3) 125 MCG
250 CAPSULE ORAL DAILY
Status: CANCELLED | OUTPATIENT
Start: 2020-04-25

## 2020-04-25 RX ORDER — POTASSIUM CHLORIDE 20 MEQ/1
40 TABLET, EXTENDED RELEASE ORAL PRN
Status: CANCELLED | OUTPATIENT
Start: 2020-04-25

## 2020-04-25 RX ORDER — UREA 10 %
1 LOTION (ML) TOPICAL NIGHTLY PRN
Status: CANCELLED | OUTPATIENT
Start: 2020-04-25

## 2020-04-25 ASSESSMENT — ENCOUNTER SYMPTOMS
ABDOMINAL PAIN: 1
TROUBLE SWALLOWING: 0
ABDOMINAL DISTENTION: 0
WHEEZING: 0
SORE THROAT: 0
COUGH: 1
NAUSEA: 0
SHORTNESS OF BREATH: 1
VOMITING: 0
BACK PAIN: 0

## 2020-04-25 NOTE — ED PROVIDER NOTES
Findings   may represent early interstitial edema, but atypical pneumonia (including   viral pneumonia) should also be considered.                LABS:  Labs Reviewed   CBC WITH AUTO DIFFERENTIAL - Abnormal; Notable for the following components:       Result Value    .0 (*)     RDW 19.1 (*)     Platelets 110 (*)     Immature Granulocytes 2 (*)     All other components within normal limits   TROPONIN - Abnormal; Notable for the following components:    Troponin, High Sensitivity 16 (*)     All other components within normal limits   TROPONIN - Abnormal; Notable for the following components:    Troponin, High Sensitivity 15 (*)     All other components within normal limits   BASIC METABOLIC PANEL W/ REFLEX TO MG FOR LOW K   BRAIN NATRIURETIC PEPTIDE   COVID-19         EMERGENCY DEPARTMENT COURSE:     -------------------------  BP: (!) 161/120, Temp: 98.9 °F (37.2 °C), Pulse: 86, Resp: 29      Comments    COPD, ACS  3 days of fatigue and SOB with chest discomfort    Denies infectious exposures    10:50 PM EDT  CXR clear  Just notified by staff that we've be waiting for IV access    Will proceed with cardiac workup    Sign out to oncoming juwaning    (Please note that portions of this note were completed with a voice recognition program.  Efforts were made to edit the dictations but occasionally words are mis-transcribed.)      Orozco MD  Attending Emergency Physician          Amanda Nunez MD  04/28/20 [id number]       Amanda Nunez MD  05/05/20 27 Jam Fernando MD  05/17/20 [id number]

## 2020-04-25 NOTE — ED PROVIDER NOTES
[facility]  Emergency Department Encounter  Emergency Medicine Resident     Pt Name: Rachel Leiva  MRN: [id number]  [place] 1955  Date of evaluation: 4/24/20  PCP:  No primary care provider on file. CHIEF COMPLAINT       Chief Complaint   Patient presents with    Fatigue    Shortness of Breath       HISTORY OF PRESENT ILLNESS  (Location/Symptom, Timing/Onset, Context/Setting, Quality, Duration, Modifying Factors, Severity.)    Emi Daniels is a 77 y.o. female who presents with complaint of fatigue, body aches, worsening shortness of breath for the past 3 days. Patient states that the shortness of breath began 3 days ago and has been steadily worsening. Cannot think of any inciting event but also endorses chest discomfort and some epigastric discomfort as well. Patient states that she has been staying home and has not been out or around other people. Denies any known sick contacts. Denies any recent travel, denies any hormone use. Denies any recent surgery. Denies any immobilization or splinting. Denies any calf pain. Denies any lower extremity swelling.     PPE Worn:  Gloves: Yes  Eye Protection: Goggles  Mask: P100 Respirator  Gown: YES    PAST MEDICAL / SURGICAL / SOCIAL / FAMILY HISTORY    has a past medical history of Appendicitis, Cerebral artery occlusion with cerebral infarction ([address] CHF (congestive heart failure) (HCC), Chronic respiratory failure with hypoxia and hypercapnia (HCC), Chronic rhinitis, Cigarette smoker, COPD (chronic obstructive pulmonary disease) ([address] Depression, Dysphagia, Essential hypertension, GERD (gastroesophageal reflux disease), Heart failure, diastolic, with acute decompensation ([address] Hepatitis C, chronic ([address] History of smoking at least 1 pack per day for at least 30 years, Hyperlipidemia, Hypertension, Migraine, Moderate COPD (chronic obstructive pulmonary disease) ([address] Multiple transfusions, Neurogenic dysphagia, Osteoarthritis, acute bony abnormalities are detected. Frontal, very fine linear metallic foreign bodies are seen superimposed over the lower neck, upper lung apices, unchanged. Increased interstitial opacity found throughout both lungs, more prominent when compared to previous exams, and somewhat greater on the left. Findings may represent early interstitial edema, but atypical pneumonia (including viral pneumonia) should also be considered. EKG    EKG Interpretation    Interpreted by me    Rhythm: normal sinus   Rate: normal  Axis: normal  Ectopy: none  Conduction: normal  ST Segments: no acute change  T Waves: Inversion in V5 and V6, flattening in multiple leads  Q Waves: none    Clinical Impression: Sinus rhythm rate of 87. No ST segment changes, no arrhythmia, no ectopy. Normal axis with poor R wave progression. T wave inversion in V5, V6. T wave flattening in the limb leads. T wave inversion in V5 V6 is new compared to most recent EKG dated 2/25/2020    All EKG's are interpreted by the Emergency Department Physician whoeither signs or Co-signs this chart in the absence of a cardiologist.    Impression:  Patient presents with worsening shortness of breath, fatigue, body aches, chest discomfort. History of COPD and coronary artery disease. Has not been around any ill contacts that she knows of. States she has been trying to self isolate. Rhonchi diffusely on lung exam.  No pain to lower extremities, no swelling, no unilateral swelling. Plan for cardiac work-up, concern for COVID-19 this patient as well. Patient does not appear hypoxic with pulse ox in the mid to high 90s. Respiratory rate however is in the mid to high 20s. EMERGENCY DEPARTMENT COURSE:  ED Course as of Apr 25 0150   Fri Apr 24, 2020   4665 Spoke with patient regarding covid testing. Patient is refusing covid testing.   Patient states that the paramedics told her that she does not have the coronavirus so she believes she is no chance

## 2020-04-25 NOTE — ED NOTES
Pt removed nasal canula and is refusing to put it back on      Elisa CollazoP[facility]  04/25/20 [id number]

## 2020-06-15 ENCOUNTER — APPOINTMENT (OUTPATIENT)
Dept: MRI IMAGING | Age: 65
End: 2020-06-15
Payer: MEDICARE

## 2020-06-15 ENCOUNTER — HOSPITAL ENCOUNTER (OUTPATIENT)
Age: 65
Setting detail: OBSERVATION
Discharge: HOME | End: 2020-06-16
Attending: EMERGENCY MEDICINE | Admitting: EMERGENCY MEDICINE
Payer: MEDICARE

## 2020-06-15 ENCOUNTER — APPOINTMENT (OUTPATIENT)
Dept: GENERAL RADIOLOGY | Age: 65
End: 2020-06-15
Payer: MEDICARE

## 2020-06-15 PROBLEM — M87.00 AVN (AVASCULAR NECROSIS OF BONE) (HCC): Status: ACTIVE | Noted: 2020-06-15

## 2020-06-15 PROCEDURE — 73721 MRI JNT OF LWR EXTRE W/O DYE: CPT

## 2020-06-15 PROCEDURE — 6370000000 HC RX 637 (ALT 250 FOR IP): Performed by: STUDENT IN AN ORGANIZED HEALTH CARE EDUCATION/TRAINING PROGRAM

## 2020-06-15 PROCEDURE — 73502 X-RAY EXAM HIP UNI 2-3 VIEWS: CPT

## 2020-06-15 PROCEDURE — 99284 EMERGENCY DEPT VISIT MOD MDM: CPT

## 2020-06-15 PROCEDURE — G0378 HOSPITAL OBSERVATION PER HR: HCPCS

## 2020-06-15 PROCEDURE — 94640 AIRWAY INHALATION TREATMENT: CPT

## 2020-06-15 RX ORDER — BUTALBITAL, ACETAMINOPHEN AND CAFFEINE 50; 325; 40 MG/1; MG/1; MG/1
1 TABLET ORAL EVERY 4 HOURS PRN
Status: DISCONTINUED | OUTPATIENT
Start: 2020-06-15 | End: 2020-06-16 | Stop reason: HOSPADM

## 2020-06-15 RX ORDER — IPRATROPIUM BROMIDE AND ALBUTEROL SULFATE 2.5; .5 MG/3ML; MG/3ML
1 SOLUTION RESPIRATORY (INHALATION) EVERY 4 HOURS PRN
Status: DISCONTINUED | OUTPATIENT
Start: 2020-06-15 | End: 2020-06-16 | Stop reason: HOSPADM

## 2020-06-15 RX ORDER — ONDANSETRON 4 MG/1
4 TABLET, ORALLY DISINTEGRATING ORAL EVERY 8 HOURS PRN
Status: DISCONTINUED | OUTPATIENT
Start: 2020-06-15 | End: 2020-06-16 | Stop reason: HOSPADM

## 2020-06-15 RX ORDER — OXYCODONE HYDROCHLORIDE 5 MG/1
5 TABLET ORAL EVERY 6 HOURS PRN
Status: DISCONTINUED | OUTPATIENT
Start: 2020-06-15 | End: 2020-06-16 | Stop reason: HOSPADM

## 2020-06-15 RX ORDER — LANOLIN ALCOHOL/MO/W.PET/CERES
325 CREAM (GRAM) TOPICAL DAILY
Status: DISCONTINUED | OUTPATIENT
Start: 2020-06-15 | End: 2020-06-16 | Stop reason: HOSPADM

## 2020-06-15 RX ORDER — SODIUM CHLORIDE 0.9 % (FLUSH) 0.9 %
10 SYRINGE (ML) INJECTION EVERY 12 HOURS SCHEDULED
Status: DISCONTINUED | OUTPATIENT
Start: 2020-06-15 | End: 2020-06-16 | Stop reason: HOSPADM

## 2020-06-15 RX ORDER — FOLIC ACID 1 MG/1
1 TABLET ORAL DAILY
Status: DISCONTINUED | OUTPATIENT
Start: 2020-06-15 | End: 2020-06-16 | Stop reason: HOSPADM

## 2020-06-15 RX ORDER — SODIUM CHLORIDE 0.9 % (FLUSH) 0.9 %
10 SYRINGE (ML) INJECTION PRN
Status: DISCONTINUED | OUTPATIENT
Start: 2020-06-15 | End: 2020-06-16 | Stop reason: HOSPADM

## 2020-06-15 RX ORDER — ATORVASTATIN CALCIUM 40 MG/1
40 TABLET, FILM COATED ORAL NIGHTLY
Status: DISCONTINUED | OUTPATIENT
Start: 2020-06-15 | End: 2020-06-16 | Stop reason: HOSPADM

## 2020-06-15 RX ORDER — HYDROXYZINE HYDROCHLORIDE 25 MG/1
25 TABLET, FILM COATED ORAL EVERY 8 HOURS PRN
Status: DISCONTINUED | OUTPATIENT
Start: 2020-06-15 | End: 2020-06-16 | Stop reason: HOSPADM

## 2020-06-15 RX ORDER — OXYCODONE HYDROCHLORIDE 5 MG/1
10 TABLET ORAL EVERY 6 HOURS PRN
Status: DISCONTINUED | OUTPATIENT
Start: 2020-06-15 | End: 2020-06-16 | Stop reason: HOSPADM

## 2020-06-15 RX ORDER — BENZONATATE 100 MG/1
100 CAPSULE ORAL 3 TIMES DAILY PRN
Status: DISCONTINUED | OUTPATIENT
Start: 2020-06-15 | End: 2020-06-16 | Stop reason: HOSPADM

## 2020-06-15 RX ORDER — CLOPIDOGREL BISULFATE 75 MG/1
75 TABLET ORAL DAILY
Status: DISCONTINUED | OUTPATIENT
Start: 2020-06-15 | End: 2020-06-16 | Stop reason: HOSPADM

## 2020-06-15 RX ORDER — PANTOPRAZOLE SODIUM 40 MG/1
40 TABLET, DELAYED RELEASE ORAL
Status: DISCONTINUED | OUTPATIENT
Start: 2020-06-15 | End: 2020-06-16 | Stop reason: HOSPADM

## 2020-06-15 RX ORDER — IBUPROFEN 400 MG/1
200 TABLET ORAL
Status: DISCONTINUED | OUTPATIENT
Start: 2020-06-15 | End: 2020-06-16 | Stop reason: HOSPADM

## 2020-06-15 RX ORDER — BUDESONIDE AND FORMOTEROL FUMARATE DIHYDRATE 80; 4.5 UG/1; UG/1
2 AEROSOL RESPIRATORY (INHALATION) 2 TIMES DAILY
Status: DISCONTINUED | OUTPATIENT
Start: 2020-06-15 | End: 2020-06-16 | Stop reason: HOSPADM

## 2020-06-15 RX ORDER — GABAPENTIN 300 MG/1
300 CAPSULE ORAL 3 TIMES DAILY
Status: DISCONTINUED | OUTPATIENT
Start: 2020-06-15 | End: 2020-06-16 | Stop reason: HOSPADM

## 2020-06-15 RX ORDER — DOCUSATE SODIUM 100 MG/1
100 CAPSULE, LIQUID FILLED ORAL 2 TIMES DAILY
Status: DISCONTINUED | OUTPATIENT
Start: 2020-06-15 | End: 2020-06-16 | Stop reason: HOSPADM

## 2020-06-15 RX ORDER — ACETAMINOPHEN 325 MG/1
650 TABLET ORAL EVERY 4 HOURS PRN
Status: DISCONTINUED | OUTPATIENT
Start: 2020-06-15 | End: 2020-06-16 | Stop reason: HOSPADM

## 2020-06-15 RX ORDER — CARVEDILOL 6.25 MG/1
6.25 TABLET ORAL 2 TIMES DAILY WITH MEALS
Status: DISCONTINUED | OUTPATIENT
Start: 2020-06-15 | End: 2020-06-16 | Stop reason: HOSPADM

## 2020-06-15 RX ORDER — AMITRIPTYLINE HYDROCHLORIDE 10 MG/1
10 TABLET, FILM COATED ORAL NIGHTLY
Status: DISCONTINUED | OUTPATIENT
Start: 2020-06-15 | End: 2020-06-16 | Stop reason: HOSPADM

## 2020-06-15 RX ADMIN — DESMOPRESSIN ACETATE 40 MG: 0.2 TABLET ORAL at 23:13

## 2020-06-15 RX ADMIN — PANTOPRAZOLE SODIUM 40 MG: 40 TABLET, DELAYED RELEASE ORAL at 20:12

## 2020-06-15 RX ADMIN — CARVEDILOL 6.25 MG: 25 TABLET, FILM COATED ORAL at 20:06

## 2020-06-15 RX ADMIN — GABAPENTIN 300 MG: 300 CAPSULE ORAL at 23:27

## 2020-06-15 RX ADMIN — FOLIC ACID 1 MG: 1 TABLET ORAL at 23:13

## 2020-06-15 RX ADMIN — BUDESONIDE AND FORMOTEROL FUMARATE DIHYDRATE 2 PUFF: 80; 4.5 AEROSOL RESPIRATORY (INHALATION) at 21:10

## 2020-06-15 RX ADMIN — IBUPROFEN 200 MG: 400 TABLET, FILM COATED ORAL at 20:11

## 2020-06-15 RX ADMIN — DOCUSATE SODIUM 100 MG: 100 CAPSULE, LIQUID FILLED ORAL at 20:06

## 2020-06-15 RX ADMIN — HYDROXYZINE HYDROCHLORIDE 25 MG: 25 TABLET ORAL at 20:06

## 2020-06-15 RX ADMIN — OXYCODONE HYDROCHLORIDE 10 MG: 5 TABLET ORAL at 17:17

## 2020-06-15 RX ADMIN — FERROUS SULFATE TAB EC 325 MG (65 MG FE EQUIVALENT) 325 MG: 325 (65 FE) TABLET DELAYED RESPONSE at 23:13

## 2020-06-15 ASSESSMENT — ENCOUNTER SYMPTOMS
ABDOMINAL DISTENTION: 0
CHOKING: 0
BLOOD IN STOOL: 0
NAUSEA: 0
STRIDOR: 0
SORE THROAT: 0
ANAL BLEEDING: 0
APNEA: 0
CHEST TIGHTNESS: 0
DIARRHEA: 0
ABDOMINAL PAIN: 0
COUGH: 0
WHEEZING: 0
COLOR CHANGE: 0
SHORTNESS OF BREATH: 0
CONSTIPATION: 0
VOMITING: 0

## 2020-06-15 ASSESSMENT — PAIN DESCRIPTION - LOCATION: LOCATION: HIP

## 2020-06-15 ASSESSMENT — PAIN DESCRIPTION - ORIENTATION: ORIENTATION: LEFT

## 2020-06-15 ASSESSMENT — PAIN SCALES - GENERAL
PAINLEVEL_OUTOF10: 0
PAINLEVEL_OUTOF10: 9

## 2020-06-15 ASSESSMENT — PAIN DESCRIPTION - PAIN TYPE: TYPE: ACUTE PAIN

## 2020-06-15 NOTE — ED PROVIDER NOTES
[facility]  ED  Emergency Department Encounter  Non Emergency Medicine Resident     Pt Name: Fabio Gaming  MRN: [id number]  Adonaygfsurinder 1955  Date of evaluation: 6/15/20  PCP:  No primary care provider on file. CHIEF COMPLAINT       Chief Complaint   Patient presents with    Hip Pain     left hip pain, worsening over the past 5 days        HISTORY OF PRESENT ILLNESS  (Location/Symptom, Timing/Onset, Context/Setting,Quality, Duration, Modifying Factors, Severity.)      Emi Daniels is a 77 y.o. female who presents with left hip pain, started two weeks ago after an incident of fall on the left side, pt has a history of right hip joint replacement after a fracture one year ago, pt came in today with 9/10 pain, radiating to the middle of the femur, no weakness or loss of sensation, per patient it is tender to touch, pt denied any SOB, fever, chills, sick contact, diarrhea, constipation, vomiting or nausea. PAST MEDICAL / SURGICAL /SOCIAL / FAMILY HISTORY      has a past medical history of Appendicitis, Cerebral artery occlusion with cerebral infarction ([address] CHF (congestive heart failure) (HCC), Chronic respiratory failure with hypoxia and hypercapnia (HCC), Chronic rhinitis, Cigarette smoker, COPD (chronic obstructive pulmonary disease) ([address] Depression, Dysphagia, Essential hypertension, GERD (gastroesophageal reflux disease), Heart failure, diastolic, with acute decompensation ([address] Hepatitis C, chronic (HCC), History of smoking at least 1 pack per day for at least 30 years, Hyperlipidemia, Hypertension, Migraine, Moderate COPD (chronic obstructive pulmonary disease) ([address] Multiple transfusions, Neurogenic dysphagia, Osteoarthritis, Pneumonia, Scoliosis, and Substance abuse ([address] has a past surgical history that includes  section; LEEP (); Upper gastrointestinal endoscopy (); Gastric bypass surgery;  Appendectomy (2017); laparoscopic appendectomy (N/A, tablet Take 1 tablet by mouth nightly as needed for Sleep 1/11/19   Marshal Juan MD   ipratropium-albuterol (DUONEB) 0.5-2.5 (3) MG/3ML SOLN nebulizer solution Inhale 3 mLs into the lungs every 4 hours as needed for Shortness of Breath 1/11/19   Marshal Juan MD   mometasone-formoterol [facility]) 100-5 MCG/ACT inhaler Inhale 2 puffs into the lungs 2 times daily 12/5/18   Bill Serrato MD   pantoprazole (PROTONIX) 40 MG tablet Take 1 tablet by mouth 2 times daily (before meals) 11/13/18   Shahriar Dumas MD   folic acid (FOLVITE) 1 MG tablet Take 1 tablet by mouth daily 10/13/18   Stewart Alpers, MD   vitamin B-1 100 MG tablet Take 1 tablet by mouth daily 10/13/18   Stewart Alpers, MD   vitamin B-12 250 MCG tablet Take 1 tablet by mouth daily 10/13/18   Stewart Alpers, MD   gabapentin (NEURONTIN) 300 MG capsule Take 300 mg by mouth 3 times daily. [place] Shahnaz Historical Provider, MD   Multiple Vitamins-Minerals (THERAPEUTIC MULTIVITAMIN-MINERALS) tablet Take 1 tablet by mouth daily 8/12/18   Mohini Beard MD       REVIEW OF SYSTEMS    (2-9 systems for level 4, 10 or more for level 5)      Review of Systems   Constitutional: Negative for chills, diaphoresis, fatigue and fever. HENT: Negative for congestion, ear pain, hearing loss, mouth sores, sore throat and tinnitus. Respiratory: Negative for apnea, cough, choking, chest tightness, shortness of breath, wheezing and stridor. Cardiovascular: Negative for chest pain, palpitations and leg swelling. Gastrointestinal: Negative for abdominal distention, abdominal pain, anal bleeding, blood in stool, constipation, diarrhea, nausea and vomiting. Genitourinary: Negative for vaginal pain. Musculoskeletal: Positive for gait problem. Left hip pain   Skin: Negative for color change, pallor, rash and wound. Neurological: Negative for seizures, syncope, weakness, numbness and headaches.    Psychiatric/Behavioral: Negative for agitation, behavioral problems, confusion, MRI HIP LEFT WO CONTRAST    Diet NPO Effective Now    Non weight bearing    Inpatient consult to Orthopedic Surgery    PATIENT STATUS (FROM ED OR OR/PROCEDURAL) Observation       MEDICATIONSORDERED:  No orders of the defined types were placed in this encounter. DDX: left hip fracture, septic arthritis, left hip strain,    DIAGNOSTIC RESULTS / EMERGENCY DEPARTMENT COURSE / MDM     LABS:  No results found for this visit on 06/15/20. IMPRESSION: AVN of the left femur     RADIOLOGY:  None    EKG  None    All EKG's are interpreted by the Emergency Department Physician who either signs or Co-signsthis chart in the absence of a cardiologist.    EMERGENCY DEPARTMENT COURSE:  Left hip x ray: showed left AVN in left hip, orthopedic consulted, pt will be admitted to the observation unit for PT/OT and pain management. PROCEDURES:  None    CONSULTS:  IP CONSULT TO ORTHOPEDIC SURGERY    CRITICAL CARE:  None    FINAL IMPRESSION      1. Avascular necrosis of left femur ([address]          DISPOSITION / PLAN     DISPOSITION  Admitted to the observation unit      PATIENT REFERRED TO:  No follow-up provider specified.     DISCHARGE MEDICATIONS:  New Prescriptions    No medications on file       Vale Cowden, MD  [id number] medicine resident     Vale Cowden, MD  Resident  06/15/20 [id number]       Vale Cowden, MD  Resident  06/15/20 [id number]

## 2020-06-15 NOTE — CARE COORDINATION
Case Management Initial Discharge Plan  Emi Daniels,             Met with:patient to discuss discharge plans. Information verified: address, contacts, phone number, , insurance Yes    Emergency Contact/Next of Kin name & number: Albin Ryan,  [phone]    PCP: No primary care provider on file. Date of last visit: patient states she sees several doctors at [facility] and was last seen about 2 months ago     Insurance Provider: [employer]     Discharge Planning    Living Arrangements:      Support Systems:  Family Members, Friends/Neighbors    Home has 1 stories  0 stairs to climb to get into front door, na stairs to climb to reach second floor  Location of bedroom/bathroom in home first floor     Patient able to perform ADL's: able to complete her own ADL's but needs assistance with IADL's from her sister and her son     Current Services (outpatient & in home) none   DME equipment: Borrowing a Wheeled walker from a friend   DME provider: na     Receiving oral anticoagulation therapy? No    If indicated:   Physician managing anticoagulation treatment: na   Where does patient obtain lab work for ATC treatment? Na       Potential Assistance Needed:  Home Care, Durable Medical Equipment    Patient agreeable to home care: No  [place] of choice provided:  n/a    Prior SNF/Rehab Placement and Facility: no  Agreeable to SNF/Rehab: No  [place] of choice provided: n/a     Evaluation: n/a    Expected Discharge date:       Patient expects to be discharged to:  Home  Follow Up Appointment: Best Day/ Time:      Transportation provider: medical cab   Transportation arrangements needed for discharge: Yes    Readmission Risk              Risk of Unplanned Readmission:        0             Does patient have a readmission risk score greater than 14?: No  If yes, follow-up appointment must be made within 7 days of discharge.      Goals of Care: to be able to walk easier       Discharge Plan: Home

## 2020-06-15 NOTE — ED NOTES
Pt resting on stretcher in NAD. Awaiting xray results.   Will continue to monitor      Adriana Parikh RN  06/15/20 [id number]

## 2020-06-15 NOTE — ED NOTES
Pt called out, RN at bedside.    Pt assisted with repositioning      Trish Nicolas RN  06/15/20 [id number]

## 2020-06-15 NOTE — ED NOTES
Pt returned from MRI.     Repeat vitals obtained      [facility], RN  06/15/20 [id number]

## 2020-06-15 NOTE — CONSULTS
Raheel Larson      CC/Reason for consult: Left hip pain    HPI:      The patient is a 77 y.o. female presenting to [place] [address] ED with left hip pain. Patient states approximately two weeks prior she fell on her buttocks while in her rolling wheelchair. She had mild hip pain at the time but was able to get around the house and stand to cook meals. Over the last 5 days, the patient has complained of increasing left hip pain with more difficult with weightbearing. Patient has a history of right total hip arthroplasty with Dr. Augustina Larson in 2019 for AVN. GCS 15. Vitals were reviewed. Patient does not complain of any other orthopedic issues.     Past Medical History:    Past Medical History:   Diagnosis Date    Appendicitis 3/27/2017    Cerebral artery occlusion with cerebral infarction ([address]     CHF (congestive heart failure) (HCC)     Chronic respiratory failure with hypoxia and hypercapnia (HCC)     Chronic rhinitis     Cigarette smoker     COPD (chronic obstructive pulmonary disease) ([address] 2018    Depression     Dysphagia     Essential hypertension 2018    GERD (gastroesophageal reflux disease)     Heart failure, diastolic, with acute decompensation ([address] 2018    Hepatitis C, chronic (HCC)     History of smoking at least 1 pack per day for at least 30 years     Hyperlipidemia     Hypertension     Migraine     Moderate COPD (chronic obstructive pulmonary disease) (HCC)     Multiple transfusions     Neurogenic dysphagia     Osteoarthritis     hands    Pneumonia     Scoliosis     Substance abuse ([address]     ivda and cocaine abuse in past     Past Surgical History:    Past Surgical History:   Procedure Laterality Date    APPENDECTOMY  2017     SECTION      GASTRIC BYPASS SURGERY      HIP SURGERY Right 2019    HIP TOTAL ARTHROPLASTY ANTERIOR APPROACH - MEDACTA,  C-ARM, (Right )    LAPAROSCOPIC APPENDECTOMY N/A 3/27/2017 APPENDECTOMY LAPAROSCOPIC performed by Cyndi Baird MD at [address] [place] [address]  2002    hgsil    NC COLONOSCOPY W/BIOPSY SINGLE/MULTIPLE N/A 11/10/2018    COLONOSCOPY WITH BIOPSY performed by German Michelle MD at [facility]    NC EGD TRANSORAL BIOPSY SINGLE/MULTIPLE N/A 11/9/2018    EGD ESOPHAGOGASTRODUODENOSCOPY performed by Bryanna Rodas MD at [facility] Right 8/13/2019    HIP TOTAL ARTHROPLASTY ANTERIOR APPROACH - [facility],  C-ARM, performed by Miah Sinclair DO at [facility]  2008    oesophagitis, candidiasis    UPPER GASTROINTESTINAL ENDOSCOPY  11/05/2018    UPPER GASTROINTESTINAL ENDOSCOPY N/A 11/5/2018    EGD CONTROL HEMORRHAGE performed by Bryanna Rodas MD at [place] [address]     Medications Prior to Admission:   Prior to Admission medications    Medication Sig Start Date End Date Taking? Authorizing Provider   diclofenac (VOLTAREN) 50 MG EC tablet Take 1 tablet by mouth 2 times daily 4/6/20   Renetta Tonny, DO   amitriptyline (ELAVIL) 10 MG tablet Take 1 tablet by mouth nightly 1/31/20   Annel Andino, DO   atorvastatin (LIPITOR) 40 MG tablet Take 1 tablet by mouth nightly 1/31/20   Mickiel Hollie, DO   butalbital-acetaminophen-caffeine (FIORICET, ESGIC) -40 MG per tablet Take 1 tablet by mouth every 4 hours as needed for Headaches 1/31/20   Prasanna Browne, DO   clopidogrel (PLAVIX) 75 MG tablet Take 1 tablet by mouth daily 2/1/20   Annel Andino, DO   albuterol sulfate HFA (PROVENTIL HFA) 108 (90 Base) MCG/ACT inhaler Inhale 1-2 puffs into the lungs once for 1 dose 1/9/20 1/9/20  Enriqueta Pena MD   ondansetron (ZOFRAN ODT) 4 MG disintegrating tablet Take 1 tablet by mouth every 8 hours as needed for Nausea 9/9/19   Nela Silverio PA-C   docusate sodium (COLACE, DULCOLAX) 100 MG CAPS Take 100 mg by mouth 2 times daily 8/22/19   Juani Greer MD   Mis.  Devices [facility]) MISC 1 Device by Does not apply route once for 1 dose 8/5/19 8/5/19  Yvonne Horn PA-C   hydrOXYzine (ATARAX) 25 MG tablet Take 1 tablet by mouth every 8 hours as needed for Itching 8/5/19   Yvonne Horn PA-C   acetaminophen (TYLENOL) 325 MG tablet Take 1 tablet by mouth every 6 hours as needed for Pain 6/22/19   Marco Antonio Ellsworth, DO   benzonatate (TESSALON PERLES) 100 MG capsule Take 1 capsule by mouth 3 times daily as needed for Cough 3/11/19   Trevor Lopez, DO   carvedilol (COREG) 6.25 MG tablet Take 1 tablet by mouth 2 times daily (with meals) 2/7/19   Hay Reid MD   ferrous sulfate (MELISSA-HAO) 325 (65 Fe) MG tablet Take 1 tablet by mouth daily 2/7/19   Hay Reid MD   melatonin 1 MG tablet Take 1 tablet by mouth nightly as needed for Sleep 1/11/19   Windy Esquivel MD   ipratropium-albuterol (DUONEB) 0.5-2.5 (3) MG/3ML SOLN nebulizer solution Inhale 3 mLs into the lungs every 4 hours as needed for Shortness of Breath 1/11/19   Windy Esquivel MD   mometasone-formoterol [facility]) 100-5 MCG/ACT inhaler Inhale 2 puffs into the lungs 2 times daily 12/5/18   Raisa España MD   pantoprazole (PROTONIX) 40 MG tablet Take 1 tablet by mouth 2 times daily (before meals) 11/13/18   Rizwana Felder MD   folic acid (FOLVITE) 1 MG tablet Take 1 tablet by mouth daily 10/13/18   Major Coates MD   vitamin B-1 100 MG tablet Take 1 tablet by mouth daily 10/13/18   Major Coates MD   vitamin B-12 250 MCG tablet Take 1 tablet by mouth daily 10/13/18   Major Coates MD   gabapentin (NEURONTIN) 300 MG capsule Take 300 mg by mouth 3 times daily. Cassie Dye MD   Multiple Vitamins-Minerals (THERAPEUTIC MULTIVITAMIN-MINERALS) tablet Take 1 tablet by mouth daily 8/12/18   Adriana Ng MD     Allergies:    Aspirin and Radha Penning [cephalexin]    Social History:   Social History     Socioeconomic History    Marital status: Single     Spouse name: None    Number of children: None    Years of education: None    Highest education level: None Occupational History    None   Social Needs    Financial resource strain: None    Food insecurity     Worry: None     Inability: None    Transportation needs     Medical: None     Non-medical: None   Tobacco Use    Smoking status: Former Smoker     Packs/day: 1.00     Years: 40.00     Pack years: 40.00     Types: Cigarettes     Start date:      Last attempt to quit: 2018     Years since quittin.6    Smokeless tobacco: Never Used   Substance and Sexual Activity    Alcohol use: No     Alcohol/week: 0.0 standard drinks    Drug use: No     Types: IV, Cocaine     Comment: past history    Sexual activity: None   Lifestyle    Physical activity     Days per week: None     Minutes per session: None    Stress: None   Relationships    Social connections     Talks on phone: None     Gets together: None     Attends Scientologist service: None     Active member of club or organization: None     Attends meetings of clubs or organizations: None     Relationship status: None    Intimate partner violence     Fear of current or ex partner: None     Emotionally abused: None     Physically abused: None     Forced sexual activity: None   Other Topics Concern    None   Social History Narrative    None     Family History:  Family History   Problem Relation Age of Onset    Breast Cancer Mother     Heart Disease Father      REVIEW OF SYSTEMS:   Constitutional: Negative for fever and chills. HENT: Negative for congestion. Eyes: Negative for blurred vision and double vision. Respiratory: Negative for cough, shortness of breath and wheezing. Cardiovascular: Negative for chest pain and palpitations. Gastrointestinal: Negative for nausea. Negative for vomiting. Musculoskeletal: Positive for myalgias and joint pain left hip. Skin: Negative for itching and rash. Neurological: Negative for dizziness, sensory change and headaches. Psychiatric/Behavioral: Negative for depression and suicidal ideas. PHYSICAL EXAM:  Blood pressure (!) 147/93, pulse 95, temperature 97.8 °F (36.6 °C), temperature source Oral, resp. rate 19, height 4' 11\" (1.499 m), weight 103 lb (46.7 kg), last menstrual period 03/18/2015, SpO2 97 %. Gen: alert and oriented, NAD, cooperative    Head: normocephalic atraumatic    Chest: Non labored breathing. Cardiovascular: Regular rate, no dependent edema, distal pulses 2+    Respiratory: Chest symmetric, no accessory muscle use, normal respirations     LLE: no significant shortening or deformity to the left lower extremity. TTP to the left groin and greater trochanter. No TTP to the femur, knee, tib/fib, ankle or foot. Patient unable to perform a straight leg raise due to pain. Hip flexion with FADIR and NICOL testing positive for pain in the left groin. Patient able to actively flex and extend her toes, plantar and dorsiflex the ankle. EHL/FHL/TA/GSC motor complex intact grossly. Sural/saphenous/SP/DP/Plantar sensory nerves SILT grossly. DP 2+. LABS:  No results for input(s): WBC, HGB, HCT, PLT, INR, PTT, NA, K, BUN, CREATININE, GLUCOSE, SEDRATE, CRP in the last 72 hours. Invalid input(s): PT     Radiology:   Radiographs of the pelvis and left hip demonstrate previous right total hip arthroplasty without interval orthopedic hardware complications. The left hip demonstrates no acute fracture or dislocation. There is significant joint space narrowing with sclerosis and cystic formation as well as femoral head collapse consistent with AVN of the left hip.     A/P: 77 y.o. female being seen for left hip AVN    -No urgent or immediate orthopedic surgical intervention at this time   -MRI ordered to evaluate for occult femoral neck fracture/intertrochanteric femur fracture, negative for occult fracture  -WBAT LLE  -Ok for general diet from Orthopedic surgery standpoint  -Patient to be admitted to the Observation team for pain control, PT and OT evaluation and treatment  -Pain control per

## 2020-06-16 VITALS
DIASTOLIC BLOOD PRESSURE: 80 MMHG | HEIGHT: 60 IN | RESPIRATION RATE: 16 BRPM | SYSTOLIC BLOOD PRESSURE: 127 MMHG | OXYGEN SATURATION: 97 % | BODY MASS INDEX: 20.22 KG/M2 | HEART RATE: 81 BPM | TEMPERATURE: 99 F | WEIGHT: 103 LBS

## 2020-06-16 PROBLEM — M87.052 AVASCULAR NECROSIS OF LEFT FEMUR (HCC): Status: ACTIVE | Noted: 2020-06-15

## 2020-06-16 PROCEDURE — 97166 OT EVAL MOD COMPLEX 45 MIN: CPT

## 2020-06-16 PROCEDURE — 6370000000 HC RX 637 (ALT 250 FOR IP): Performed by: STUDENT IN AN ORGANIZED HEALTH CARE EDUCATION/TRAINING PROGRAM

## 2020-06-16 PROCEDURE — G0378 HOSPITAL OBSERVATION PER HR: HCPCS

## 2020-06-16 PROCEDURE — 97535 SELF CARE MNGMENT TRAINING: CPT

## 2020-06-16 PROCEDURE — 97162 PT EVAL MOD COMPLEX 30 MIN: CPT

## 2020-06-16 PROCEDURE — 97530 THERAPEUTIC ACTIVITIES: CPT

## 2020-06-16 PROCEDURE — 99224 PR SBSQ OBSERVATION CARE/DAY 15 MINUTES: CPT | Performed by: ORTHOPAEDIC SURGERY

## 2020-06-16 PROCEDURE — 94640 AIRWAY INHALATION TREATMENT: CPT

## 2020-06-16 PROCEDURE — 94760 N-INVAS EAR/PLS OXIMETRY 1: CPT

## 2020-06-16 RX ORDER — OXYCODONE HYDROCHLORIDE 5 MG/1
5 TABLET ORAL EVERY 8 HOURS PRN
Qty: 12 TABLET | Refills: 0 | Status: SHIPPED | OUTPATIENT
Start: 2020-06-16 | End: 2020-06-20

## 2020-06-16 RX ADMIN — GABAPENTIN 300 MG: 300 CAPSULE ORAL at 09:06

## 2020-06-16 RX ADMIN — CLOPIDOGREL 75 MG: 75 TABLET, FILM COATED ORAL at 09:07

## 2020-06-16 RX ADMIN — PANTOPRAZOLE SODIUM 40 MG: 40 TABLET, DELAYED RELEASE ORAL at 09:04

## 2020-06-16 RX ADMIN — FOLIC ACID 1 MG: 1 TABLET ORAL at 09:06

## 2020-06-16 RX ADMIN — FERROUS SULFATE TAB EC 325 MG (65 MG FE EQUIVALENT) 325 MG: 325 (65 FE) TABLET DELAYED RESPONSE at 09:06

## 2020-06-16 RX ADMIN — DOCUSATE SODIUM 100 MG: 100 CAPSULE, LIQUID FILLED ORAL at 09:06

## 2020-06-16 RX ADMIN — CARVEDILOL 6.25 MG: 25 TABLET, FILM COATED ORAL at 09:07

## 2020-06-16 RX ADMIN — IBUPROFEN 200 MG: 400 TABLET, FILM COATED ORAL at 09:04

## 2020-06-16 RX ADMIN — BUDESONIDE AND FORMOTEROL FUMARATE DIHYDRATE 2 PUFF: 80; 4.5 AEROSOL RESPIRATORY (INHALATION) at 09:20

## 2020-06-16 ASSESSMENT — PAIN DESCRIPTION - ORIENTATION
ORIENTATION: LEFT
ORIENTATION: LEFT

## 2020-06-16 ASSESSMENT — PAIN SCALES - GENERAL
PAINLEVEL_OUTOF10: 7
PAINLEVEL_OUTOF10: 8
PAINLEVEL_OUTOF10: 8
PAINLEVEL_OUTOF10: 7

## 2020-06-16 ASSESSMENT — PAIN DESCRIPTION - LOCATION
LOCATION: HIP
LOCATION: HIP

## 2020-06-16 ASSESSMENT — PAIN DESCRIPTION - PAIN TYPE: TYPE: ACUTE PAIN

## 2020-06-16 NOTE — PROGRESS NOTES
[facility]  CDU / OBSERVATION eNCOUnter  Attending NOte       I performed a history and physical examination of the patient and discussed management with the resident. I reviewed the residents note and agree with the documented findings and plan of care. Any areas of disagreement are noted on the chart. I was personally present for the key portions of any procedures. I have documented in the chart those procedures where I was not present during the key portions. I have reviewed the nurses notes. I agree with the chief complaint, past medical history, past surgical history, allergies, medications, social and family history as documented unless otherwise noted below. The Family history, social history, and ROS are effectively unchanged since admission unless noted elsewhere in the chart. Pt offered SNF and declined. See notes from PT/OT/ortho. For outpt follow up. Home care appropriate. Emi Daniels was evaluated today and a DME order was entered for a wheeled walker because she requires this to successfully complete daily living tasks of ambulating. A wheeled walker is necessary due to the patient's unsteady gait, upper body weakness, and inability to  an ambulation device; and she can ambulate only by pushing a walker instead of a lesser assistive device such as a cane, crutch, or standard walker. The need for this equipment was discussed with the patient and she understands and is in agreement.     Brennan Burkett MD  Attending Emergency  Physician

## 2020-06-16 NOTE — PROGRESS NOTES
Occupational Therapy   Occupational Therapy Initial Assessment  Date: 2020   Patient Name: Jeff Lopez  MRN: [id number]     : 1955    Date of Service: 2020    Discharge Recommendations:  Patient would benefit from continued therapy after discharge  OT Equipment Recommendations  Equipment Needed: Yes  Mobility Devices: Luis Fernando Amend: Rolling  Copied from Emergency Medicine: Jeff Lopez is a 77 y.o. female who presents with left hip pain, started two weeks ago after an incident of fall on the left side, pt has a history of right hip joint replacement after a fracture one year ago, pt came in today with 9/10 pain, radiating to the middle of the femur, no weakness or loss of sensation, per patient it is tender to touch, pt denied any SOB, fever, chills, sick contact, diarrhea, constipation, vomiting or nausea  Assessment    Pt lying supine in bed upon entrance to room. Pt completed bed mobility with min assistance. Pt sat at eob 30 minutes with good unsupported sitting balance. Sit to stand with min assistance with r walker. Pt completed dynamic mob in room with min assistance. Pt completed toilet transfer, adls sink side. Please refer to below assist levels for adl completion. Pt calling out to use restroom, writer walking by room and reentered to assist pt. Pt ed on OT POC, safety awareness tech, proper hand placement for transfers, with fair return. Pt retired supine in bed with call light and phone in reach, bed alarm activated. All needs met upon exit. Performance deficits / Impairments: Decreased functional mobility ; Decreased endurance;Decreased ADL status; Decreased safe awareness;Decreased high-level IADLs;Decreased balance  Treatment Diagnosis: AVN L Hip  Prognosis: Fair  Decision Making: Medium Complexity  OT Education: OT Role;Plan of Care  Patient Education: Pt ed on OT POC, safety awareness tech, proper hand placement for transfers, with fair return.   REQUIRES OT FOLLOW UP: 1: Dem I with adl performance  Short term goal 2: Dem I with functional transfers  Short term goal 3: Dem good safety awareness tech for all transfers/mobility  Short term goal 4: Dem a 15 min dynamic task with SBA to increase activity tolerance  Short term goal 5: Dem 10  min static standing with sba for adl purposes       Therapy Time   Individual Concurrent Group Co-treatment   Time In 0950 1130       Time Out 1030 1152       Minutes 40 22       Timed Code Treatment Minutes: 38 Minutes   Co eval with PT    RAJENDRA BERNARDO OTR/L

## 2020-06-16 NOTE — H&P
headache, no dizziness/lightheadedness, No focal weakness, no loss of sensation  Dermatological ROS - No lesions, No rash     (PQRS) Advance directives on face sheet per hospital policy. No change unless specifically mentioned in chart    [address]    has a past medical history of Appendicitis, Cerebral artery occlusion with cerebral infarction ([address] CHF (congestive heart failure) ([address] Chronic respiratory failure with hypoxia and hypercapnia (HCC), Chronic rhinitis, Cigarette smoker, COPD (chronic obstructive pulmonary disease) ([address] Depression, Dysphagia, Essential hypertension, GERD (gastroesophageal reflux disease), Heart failure, diastolic, with acute decompensation ([address] Hepatitis C, chronic ([address] History of smoking at least 1 pack per day for at least 30 years, Hyperlipidemia, Hypertension, Migraine, Moderate COPD (chronic obstructive pulmonary disease) ([address] Multiple transfusions, Neurogenic dysphagia, Osteoarthritis, Pneumonia, Scoliosis, and Substance abuse ([address] I have reviewed the past medical history with the patient and it is is non-pertinent to this complaint. SURGICAL HISTORY      has a past surgical history that includes  section; LEEP (); Upper gastrointestinal endoscopy (); Gastric bypass surgery; Appendectomy (2017); laparoscopic appendectomy (N/A, 3/27/2017); Upper gastrointestinal endoscopy (2018); Upper gastrointestinal endoscopy (N/A, 2018); pr colonoscopy w/biopsy single/multiple (N/A, 11/10/2018); pr egd transoral biopsy single/multiple (N/A, 2018); hip surgery (Right, 2019); and Total hip arthroplasty (Right, 2019). I have reviewed and agree with Surgical History entered and it is pertinent to this complaint.      CURRENT MEDICATIONS     sodium chloride flush 0.9 % injection 10 mL, 2 times per day  sodium chloride flush 0.9 % injection 10 mL, PRN  acetaminophen (TYLENOL) tablet 650 mg, Q4H PRN  enoxaparin (LOVENOX) injection 40 mg, Daily  oxyCODONE (ROXICODONE) immediate release tablet 5 mg, Q6H PRN    Or  oxyCODONE (ROXICODONE) immediate release tablet 10 mg, Q6H PRN  pantoprazole (PROTONIX) tablet 40 mg, BID AC  ondansetron (ZOFRAN-ODT) disintegrating tablet 4 mg, Q8H PRN  budesonide-formoterol (SYMBICORT) 80-4.5 MCG/ACT inhaler 2 puff, BID  ipratropium-albuterol (DUONEB) nebulizer solution 3 mL, Q4H PRN  hydrOXYzine (ATARAX) tablet 25 mg, Q8H PRN  gabapentin (NEURONTIN) capsule 332 mg, TID  folic acid (FOLVITE) tablet 1 mg, Daily  ferrous sulfate (FE TABS 325) EC tablet 325 mg, Daily  docusate sodium (COLACE) capsule 100 mg, BID  ibuprofen (ADVIL;MOTRIN) tablet 200 mg, TID WC  clopidogrel (PLAVIX) tablet 75 mg, Daily  carvedilol (COREG) tablet 6.25 mg, BID WC  amitriptyline (ELAVIL) tablet 10 mg, Nightly  atorvastatin (LIPITOR) tablet 40 mg, Nightly  benzonatate (TESSALON) capsule 100 mg, TID PRN  butalbital-acetaminophen-caffeine (FIORICET, ESGIC) per tablet 1 tablet, Q4H PRN        All medication charted and reviewed. ALLERGIES     is allergic to aspirin and keflet [cephalexin]. FAMILY HISTORY     She indicated that her mother is . She indicated that her father is . family history includes Breast Cancer in her mother; Heart Disease in her father. The patient denies any pertinent family history. I have reviewed and agree with the family history entered. I have reviewed the Family History and it is not significant to the case    SOCIAL HISTORY      reports that she quit smoking about 19 months ago. Her smoking use included cigarettes. She started smoking about 50 years ago. She has a 40.00 pack-year smoking history. She has never used smokeless tobacco. She reports that she does not drink alcohol or use drugs. I have reviewed and agree with all Social.  There are no concerns for substance abuse/use.     PHYSICAL EXAM     INITIAL VITALS:  height is 4' 11\" (1.499 m) and weight is 103 lb (46.7 torn. BURSAE: No evidence for iliopsoas or trochanteric bursitis. SCIATIC NERVE:  The course of the sciatic nerve is normal and there is no abnormal mass seen impinging on it. MUSCLES / TENDONS: Tendinopathy and partial thickness tearing of the hamstring origin. INTRAPELVIC CONTENTS / SOFT TISSUES: Limited images of the intrapelvic contents demonstrate no acute abnormality. Avascular necrosis of the left femoral head with collapse. There is marrow edema within the collapsed portion of the femoral head. No superimposed hip fracture identified. Moderate hip joint effusion. Tendinopathy and partial thickness tearing of the hamstring origin. Xr Hip 2-3 Vw W Pelvis Left    Result Date: 6/15/2020  EXAMINATION: ONE XRAY VIEW OF THE PELVIS AND TWO XRAY VIEWS LEFT HIP 6/15/2020 12:51 pm COMPARISON: 09/18/2019. HISTORY: ORDERING SYSTEM PROVIDED HISTORY: trauma TECHNOLOGIST PROVIDED HISTORY: trauma Reason for Exam: Fall one week ago/  pain Acuity: Acute Type of Exam: Initial FINDINGS: Low AP pelvis and two views of the left hip are submitted. Status post right hip arthroplasty. No evidence of hardware complication or fracture. There is sclerosis involving the superior aspect of the left femoral head concerning for AVN. There is progressive narrowing of the joint space with lucency, likely indicative of subchondral collapse. The remainder the visualized pelvis is unremarkable. Sclerosis within the left femoral head most consistent with AVN. There is evidence of subchondral collapse with lucency in the superior aspect of the femoral head. Stable right hip post arthroplasty. LABS:  I have reviewed and interpreted all available lab results. Labs Reviewed - No data to display    CDU IMPRESSION / PLAN      Emi Daniels is a 77 y.o. female who presents with     1.)  Acute left hip pain secondary to vascular necrosis of the femur  -Ortho consulted  -MRI reveals aggressive aggressive left femur  -P.o. Percocet for pain control  -PT OT treatment and evaluate  · Diet: General   · Continue home medications and pain control  · Monitor vitals, labs, and imaging  · DISPO: pending consults and clinical improvement    CONSULTS:    IP CONSULT TO ORTHOPEDIC SURGERY    PROCEDURES:  Not indicated       PATIENT REFERRED TO:    No follow-up provider specified. --  Rena Walker DO   Emergency Medicine Resident     This dictation was generated by voice recognition computer software. Although all attempts are made to edit the dictation for accuracy, there may be errors in the transcription that are not intended.

## 2020-06-16 NOTE — DISCHARGE INSTR - COC
Continuity of Care Form    Patient Name: Sammy Sherwood   :  1955  MRN:  [id number]    Admit date:  6/15/2020  Discharge date:  2020    Code Status Order: Full Code   Advance Directives:   [facility] Documentation     Date/Time Healthcare Directive Type of Healthcare Directive Copy in [facility] [address] Agent's Name Healthcare Agent's Phone Number    06/15/20 1685  No, patient does not have an advance directive for healthcare treatment -- -- -- -- --          Admitting Physician:  Britney Oneill MD  PCP: No primary care provider on file. Discharging Nurse: [facility] Unit/Room#: 2797/6725-30  Discharging Unit Phone Number: [phone]    Emergency Contact:   Extended Emergency Contact Information  Primary Emergency Contact: Chanelle Perez [address] Phone: [phone]  Mobile Phone: [phone]  Relation: Brother/Sister   needed?  No    Past Surgical History:  Past Surgical History:   Procedure Laterality Date    APPENDECTOMY  2017     SECTION      GASTRIC BYPASS SURGERY      HIP SURGERY Right 2019    HIP TOTAL ARTHROPLASTY ANTERIOR APPROACH - [facility],  C-ARM, (Right )    LAPAROSCOPIC APPENDECTOMY N/A 3/27/2017    APPENDECTOMY LAPAROSCOPIC performed by Cyndi Baird MD at [address] [place] [address]      hgsil    MA COLONOSCOPY W/BIOPSY SINGLE/MULTIPLE N/A 11/10/2018    COLONOSCOPY WITH BIOPSY performed by German Michelle MD at [facility]    MA EGD TRANSORAL BIOPSY SINGLE/MULTIPLE N/A 2018    EGD ESOPHAGOGASTRODUODENOSCOPY performed by Bryanna Rodas MD at [address] [place] [address] Right 2019    HIP TOTAL ARTHROPLASTY ANTERIOR APPROACH - [facility],  C-ARM, performed by Miah Sinclair DO at [address]      oesophagitis, candidiasis    UPPER GASTROINTESTINAL ENDOSCOPY  2018    UPPER GASTROINTESTINAL ENDOSCOPY Nurse Assessment:  Last Vital Signs: /80   Pulse 81   Temp 99 °F (37.2 °C) (Oral)   Resp 16   Ht 5' (1.524 m)   Wt 103 lb (46.7 kg)   LMP 03/18/2015   SpO2 97%   BMI 20.12 kg/m²     Last documented pain score (0-10 scale): Pain Level: 8  Last Weight:   Wt Readings from Last 1 Encounters:   06/15/20 103 lb (46.7 kg)     Mental Status:  oriented    IV Access:  - None    Nursing Mobility/ADLs:  Walking   Assisted  Transfer  Independent  Bathing  Assisted  Dressing  Independent  Toileting  Assisted  Feeding  [address]  Independent  Med Delivery   none    Wound Care Documentation and Therapy:        Elimination:  Continence:   · Bowel: Yes  · Bladder: Yes  Urinary Catheter: None   Colostomy/Ileostomy/Ileal Conduit: No       Date of Last BM: independent  No intake or output data in the 24 hours ending 06/16/20 1254  No intake/output data recorded. Safety Concerns:     None    Impairments/Disabilities:      None    Nutrition Therapy:  Current Nutrition Therapy:   - Oral Diet:  General    Routes of Feeding: Oral  Liquids: No Restrictions  Daily Fluid Restriction: no  Last Modified Barium Swallow with Video (Video Swallowing Test): not done    Treatments at the Time of Hospital Discharge:   Respiratory Treatments: none  Oxygen Therapy:  is not on home oxygen therapy.   Ventilator:    - No ventilator support    Rehab Therapies: Physical Therapy  Weight Bearing Status/Restrictions: Non-weight bearing on left leg  Other Medical Equipment (for information only, NOT a DME order):  walker  Other Treatments: na    Patient's personal belongings (please select all that are sent with patient):  None    RN SIGNATURE:  Electronically signed by Ronny Kerns RN on 6/16/20 at 3:43 PM EDT    CASE MANAGEMENT/SOCIAL WORK SECTION    Inpatient Status Date:     Readmission Risk Assessment Score:  Readmission Risk              Risk of Unplanned Readmission:        0           Discharging to Facility/ Agency

## 2020-06-16 NOTE — PROGRESS NOTES
Physical Therapy    Facility/Department: [address] MED SURG  Initial Assessment    NAME: Emi Daniels  : 1955  MRN: [id number]    Date of Service: 2020    Discharge Recommendations:  Patient would benefit from continued therapy after discharge   Assessment   Body structures, Functions, Activity limitations: Decreased functional mobility ; Decreased endurance;Decreased strength  Assessment: The pt ambulated 20 ft with a RW x CGA with WBAT L LE. She demonstrated fatigue following ambulation and could benefit from a continuation of PT following her DC   Prognosis: Good  Decision Making: Medium Complexity  PT Education: Goals;PT Role;Plan of Care  REQUIRES PT FOLLOW UP: Yes  Activity Tolerance  Activity Tolerance: Patient limited by fatigue   Patient Diagnosis(es): The encounter diagnosis was Avascular necrosis of left femur ([address] has a past medical history of Appendicitis, Cerebral artery occlusion with cerebral infarction ([address] CHF (congestive heart failure) ([address] Chronic respiratory failure with hypoxia and hypercapnia (HCC), Chronic rhinitis, Cigarette smoker, COPD (chronic obstructive pulmonary disease) ([address] Depression, Dysphagia, Essential hypertension, GERD (gastroesophageal reflux disease), Heart failure, diastolic, with acute decompensation ([address] Hepatitis C, chronic ([address] History of smoking at least 1 pack per day for at least 30 years, Hyperlipidemia, Hypertension, Migraine, Moderate COPD (chronic obstructive pulmonary disease) ([address] Multiple transfusions, Neurogenic dysphagia, Osteoarthritis, Pneumonia, Scoliosis, and Substance abuse ([address] has a past surgical history that includes  section; LEEP (2002); Upper gastrointestinal endoscopy (); Gastric bypass surgery; Appendectomy (2017); laparoscopic appendectomy (N/A, 3/27/2017); Upper gastrointestinal endoscopy (2018);  Upper gastrointestinal endoscopy (N/A, 2018); pr colonoscopy w/biopsy single/multiple (N/A, Sensation Status: WFL  Bed mobility  Supine to Sit: Minimal assistance  Sit to Supine: Minimal assistance  Scooting: Minimal assistance  Transfers  Sit to Stand: Minimal Assistance  Stand to sit: Minimal Assistance  Ambulation  Ambulation?: Yes  Ambulation 1  Surface: level tile  Device: Rolling Walker  Distance: amb 20 ft with a RW x CGA with WBAT L LE     Balance  Posture: Good  Sitting - Static: Good  Sitting - Dynamic: Fair  Standing - Static: Fair  Standing - Dynamic: [address]  Times per week: 5-6x wk  Current Treatment Recommendations: Strengthening, Endurance Training, Gait Training, Functional Mobility Training, Stair training, Safety Education & Training  Safety Devices  Type of devices: Left in bed, Call light within reach, Nurse notified  G-Code  OutComes Score     AM-PAC Score  AM-PAC Inpatient Mobility Raw Score : 18 (06/16/20 1140)  AM-PAC Inpatient T-Scale Score : 43.63 (06/16/20 1140)  Mobility Inpatient CMS 0-100% Score: 46.58 (06/16/20 1140)  Mobility Inpatient CMS G-Code Modifier : CK (06/16/20 1140)     Goals  Short term goals  Time Frame for Short term goals: 10 visits  Short term goal 1: transfers with SBA  Short term goal 2: amb 150 ft with a RW x SBA with WBAT L LE  Short term goal 3: ascend/descend 4 steps with SBA  Short term goal 4: exercise program x SBA  Patient Goals   Patient goals : Return home     Therapy Time   Individual Concurrent Group Co-treatment   Time In 9009         Time Out 1015         Minutes 25             1 of [facility],

## 2020-06-16 NOTE — DISCHARGE SUMMARY
sulfate (MELISSA-HAO) 325 (65 Fe) MG tablet  Take 1 tablet by mouth daily             folic acid (FOLVITE) 1 MG tablet  Take 1 tablet by mouth daily             gabapentin (NEURONTIN) 300 MG capsule  Take 300 mg by mouth 3 times daily. .             hydrOXYzine (ATARAX) 25 MG tablet  Take 1 tablet by mouth every 8 hours as needed for Itching             ipratropium-albuterol (DUONEB) 0.5-2.5 (3) MG/3ML SOLN nebulizer solution  Inhale 3 mLs into the lungs every 4 hours as needed for Shortness of Breath             melatonin 1 MG tablet  Take 1 tablet by mouth nightly as needed for Sleep             Misc. Devices [facility]) MISC  1 Device by Does not apply route once for 1 dose             mometasone-formoterol (DULERA) 100-5 MCG/ACT inhaler  Inhale 2 puffs into the lungs 2 times daily             Multiple Vitamins-Minerals (THERAPEUTIC MULTIVITAMIN-MINERALS) tablet  Take 1 tablet by mouth daily             ondansetron (ZOFRAN ODT) 4 MG disintegrating tablet  Take 1 tablet by mouth every 8 hours as needed for Nausea             oxyCODONE (ROXICODONE) 5 MG immediate release tablet  Take 1 tablet by mouth every 8 hours as needed for Pain for up to 4 days. pantoprazole (PROTONIX) 40 MG tablet  Take 1 tablet by mouth 2 times daily (before meals)             vitamin B-1 100 MG tablet  Take 1 tablet by mouth daily             vitamin B-12 250 MCG tablet  Take 1 tablet by mouth daily                 Diet:  No diet orders on file , Advance as tolerated     Activity:  As tolerated    Consultants: IP CONSULT TO ORTHOPEDIC SURGERY  IP CONSULT TO SOCIAL WORK    Procedures:  Not indicated     Diagnostic Test:   No results found for this visit on 06/15/20. Mri Hip Left Wo Contrast    Result Date: 6/15/2020  EXAMINATION: MRI OF THE LEFT HIP WITHOUT CONTRAST, 6/15/2020 2:58 pm TECHNIQUE: Multiplanar multisequence MRI of the hip was performed without the administration of intravenous contrast. COMPARISON: None.  HISTORY: ORDERING SYSTEM PROVIDED HISTORY: R/o occult fracture TECHNOLOGIST PROVIDED HISTORY: R/o occult fracture FINDINGS: BONE MARROW: Status post right hip arthroplasty resulting in extent surrounding artifact. There is avascular necrosis of the left femoral head with superimposed collapse. There is marrow edema within the femoral head collapse. Patchy marrow edema within the femoral neck extending into the lesser trochanter, however there is no obvious superimposed fracture identified. HIP JOINT: Moderate joint effusion identified. LABRUM: Labrum is not clearly visualized and is likely chronically torn. BURSAE: No evidence for iliopsoas or trochanteric bursitis. SCIATIC NERVE:  The course of the sciatic nerve is normal and there is no abnormal mass seen impinging on it. MUSCLES / TENDONS: Tendinopathy and partial thickness tearing of the hamstring origin. INTRAPELVIC CONTENTS / SOFT TISSUES: Limited images of the intrapelvic contents demonstrate no acute abnormality. Avascular necrosis of the left femoral head with collapse. There is marrow edema within the collapsed portion of the femoral head. No superimposed hip fracture identified. Moderate hip joint effusion. Tendinopathy and partial thickness tearing of the hamstring origin. Xr Hip 2-3 Vw W Pelvis Left    Result Date: 6/15/2020  EXAMINATION: ONE XRAY VIEW OF THE PELVIS AND TWO XRAY VIEWS LEFT HIP 6/15/2020 12:51 pm COMPARISON: 09/18/2019. HISTORY: ORDERING SYSTEM PROVIDED HISTORY: trauma TECHNOLOGIST PROVIDED HISTORY: trauma Reason for Exam: Fall one week ago/  pain Acuity: Acute Type of Exam: Initial FINDINGS: Low AP pelvis and two views of the left hip are submitted. Status post right hip arthroplasty. No evidence of hardware complication or fracture. There is sclerosis involving the superior aspect of the left femoral head concerning for AVN.   There is progressive narrowing of the joint space with lucency, likely indicative of subchondral transcription that are not intended.

## 2020-07-16 ENCOUNTER — APPOINTMENT (OUTPATIENT)
Dept: GENERAL RADIOLOGY | Age: 65
End: 2020-07-16
Payer: MEDICARE

## 2020-07-16 ENCOUNTER — HOSPITAL ENCOUNTER (EMERGENCY)
Age: 65
Discharge: HOME | End: 2020-07-16
Attending: EMERGENCY MEDICINE
Payer: MEDICARE

## 2020-07-16 VITALS
HEART RATE: 112 BPM | SYSTOLIC BLOOD PRESSURE: 168 MMHG | RESPIRATION RATE: 18 BRPM | OXYGEN SATURATION: 97 % | TEMPERATURE: 98.5 F | DIASTOLIC BLOOD PRESSURE: 108 MMHG

## 2020-07-16 LAB
-: ABNORMAL
AMORPHOUS: ABNORMAL
BACTERIA: ABNORMAL
BILIRUBIN URINE: NEGATIVE
CASTS UA: ABNORMAL /LPF (ref 0–8)
COLOR: YELLOW
CRYSTALS, UA: ABNORMAL /HPF
EPITHELIAL CELLS UA: ABNORMAL /HPF (ref 0–5)
GLUCOSE URINE: NEGATIVE
KETONES, URINE: NEGATIVE
LEUKOCYTE ESTERASE, URINE: ABNORMAL
MUCUS: ABNORMAL
NITRITE, URINE: NEGATIVE
OTHER OBSERVATIONS UA: ABNORMAL
PH UA: 7 (ref 5–8)
PROTEIN UA: NEGATIVE
RBC UA: ABNORMAL /HPF (ref 0–4)
RENAL EPITHELIAL, UA: ABNORMAL /HPF
SPECIFIC GRAVITY UA: 1.02 (ref 1–1.03)
TRICHOMONAS: ABNORMAL
TURBIDITY: CLEAR
URINE HGB: NEGATIVE
UROBILINOGEN, URINE: NORMAL
WBC UA: ABNORMAL /HPF (ref 0–5)
YEAST: ABNORMAL

## 2020-07-16 PROCEDURE — 74018 RADEX ABDOMEN 1 VIEW: CPT

## 2020-07-16 PROCEDURE — 99283 EMERGENCY DEPT VISIT LOW MDM: CPT

## 2020-07-16 PROCEDURE — 6370000000 HC RX 637 (ALT 250 FOR IP): Performed by: STUDENT IN AN ORGANIZED HEALTH CARE EDUCATION/TRAINING PROGRAM

## 2020-07-16 PROCEDURE — 81001 URINALYSIS AUTO W/SCOPE: CPT

## 2020-07-16 PROCEDURE — 73502 X-RAY EXAM HIP UNI 2-3 VIEWS: CPT

## 2020-07-16 RX ORDER — SENNOSIDES 8.6 MG
1 TABLET ORAL DAILY
Qty: 14 TABLET | Refills: 0 | Status: ON HOLD | OUTPATIENT
Start: 2020-07-16 | End: 2020-08-02

## 2020-07-16 RX ORDER — IBUPROFEN 600 MG/1
600 TABLET ORAL 4 TIMES DAILY PRN
Qty: 56 TABLET | Refills: 0 | Status: SHIPPED | OUTPATIENT
Start: 2020-07-16 | End: 2020-10-01 | Stop reason: SDUPTHER

## 2020-07-16 RX ORDER — IBUPROFEN 400 MG/1
400 TABLET ORAL ONCE
Status: COMPLETED | OUTPATIENT
Start: 2020-07-16 | End: 2020-07-16

## 2020-07-16 RX ORDER — ACETAMINOPHEN 325 MG/1
650 TABLET ORAL ONCE
Status: COMPLETED | OUTPATIENT
Start: 2020-07-16 | End: 2020-07-16

## 2020-07-16 RX ORDER — DOCUSATE SODIUM 100 MG/1
100 CAPSULE, LIQUID FILLED ORAL 2 TIMES DAILY
Qty: 28 CAPSULE | Refills: 0 | Status: ON HOLD | OUTPATIENT
Start: 2020-07-16 | End: 2020-08-02

## 2020-07-16 RX ORDER — SENNOSIDES 8.6 MG
650 CAPSULE ORAL EVERY 8 HOURS PRN
Qty: 42 TABLET | Refills: 0 | Status: SHIPPED | OUTPATIENT
Start: 2020-07-16 | End: 2020-07-19

## 2020-07-16 RX ADMIN — IBUPROFEN 400 MG: 400 TABLET, FILM COATED ORAL at 11:16

## 2020-07-16 RX ADMIN — ACETAMINOPHEN 650 MG: 325 TABLET ORAL at 11:16

## 2020-07-16 ASSESSMENT — ENCOUNTER SYMPTOMS
VOMITING: 0
CONSTIPATION: 1
ABDOMINAL PAIN: 1
COUGH: 0
NAUSEA: 0
SHORTNESS OF BREATH: 0
SORE THROAT: 0

## 2020-07-16 ASSESSMENT — PAIN SCALES - GENERAL: PAINLEVEL_OUTOF10: 8

## 2020-07-16 NOTE — ED NOTES
Writer did a enema  Pt tolerated  Bed side commode in room for pt         Aniya Roy, RN  07/16/20 6695

## 2020-07-16 NOTE — ED NOTES
Pt came into ER in NAD, pt reports she fell 2 weeks ago off of her chair, pt denies loc, pt denies hitting head, pt reports left hit has been hurting more since, pt reports dysuria as well, pt states constipation, pt had tenderness when palpated mid lower abdomen, pt resting in bed in NAD with even and unlabored rr, will continue to assess      Tristen Melendez RN  07/16/20 [address] [place], RN  07/16/20 1132

## 2020-07-16 NOTE — ED PROVIDER NOTES
Not on file     Relationship status: Not on file    Intimate partner violence     Fear of current or ex partner: Not on file     Emotionally abused: Not on file     Physically abused: Not on file     Forced sexual activity: Not on file   Other Topics Concern    Not on file   Social History Narrative    Not on file       Family History   Problem Relation Age of Onset    Breast Cancer Mother     Heart Disease Father        Allergies:  Aspirin and Keflet [cephalexin]    Home Medications:  Prior to Admission medications    Medication Sig Start Date End Date Taking?  Authorizing Provider   docusate sodium (COLACE) 100 MG capsule Take 1 capsule by mouth 2 times daily for 14 days 7/16/20 7/30/20 Yes Jose Bryson DO   ibuprofen (ADVIL;MOTRIN) 600 MG tablet Take 1 tablet by mouth 4 times daily as needed for Pain 7/16/20 7/30/20 Yes Jose Bryson DO   acetaminophen (TYLENOL 8 HOUR) 650 MG extended release tablet Take 1 tablet by mouth every 8 hours as needed for Pain 7/16/20 7/30/20 Yes Jose Bryson DO   senna (SENOKOT) 8.6 MG TABS tablet Take 1 tablet by mouth daily for 14 days 7/16/20 7/30/20 Yes Rosa Flores,    diclofenac (VOLTAREN) 50 MG EC tablet Take 1 tablet by mouth 2 times daily 4/6/20   Kelly Cohn DO   amitriptyline (ELAVIL) 10 MG tablet Take 1 tablet by mouth nightly 1/31/20   Annel Andino DO   atorvastatin (LIPITOR) 40 MG tablet Take 1 tablet by mouth nightly 1/31/20   Feng Hunt DO   butalbital-acetaminophen-caffeine (FIORICET, ESGIC) -40 MG per tablet Take 1 tablet by mouth every 4 hours as needed for Headaches 1/31/20   Feng Hunt DO   clopidogrel (PLAVIX) 75 MG tablet Take 1 tablet by mouth daily 2/1/20   Annel Andino DO   albuterol sulfate HFA (PROVENTIL HFA) 108 (90 Base) MCG/ACT inhaler Inhale 1-2 puffs into the lungs once for 1 dose 1/9/20 6/15/20  Nicole Vallejo MD   ondansetron (ZOFRAN ODT) 4 MG disintegrating tablet Take 1 tablet by mouth every 8 hours as needed for Nausea 9/9/19   Aliya Griffin PA-C   docusate sodium (COLACE, DULCOLAX) 100 MG CAPS Take 100 mg by mouth 2 times daily 8/22/19   Yaneth Mckeon MD   [facility]) MISC 1 Device by Does not apply route once for 1 dose 8/5/19 8/5/19  Aliya Griffin PA-C   hydrOXYzine (ATARAX) 25 MG tablet Take 1 tablet by mouth every 8 hours as needed for Itching 8/5/19   Aliya Griffin PA-C   acetaminophen (TYLENOL) 325 MG tablet Take 1 tablet by mouth every 6 hours as needed for Pain 6/22/19   Mackenzie Miguel DO   benzonatate (TESSALON PERLES) 100 MG capsule Take 1 capsule by mouth 3 times daily as needed for Cough 3/11/19   Bradley Lopez DO   carvedilol (COREG) 6.25 MG tablet Take 1 tablet by mouth 2 times daily (with meals) 2/7/19   Leandro Brar MD   ferrous sulfate (MELISSA-HAO) 325 (65 Fe) MG tablet Take 1 tablet by mouth daily 2/7/19   Leandro Brar MD   melatonin 1 MG tablet Take 1 tablet by mouth nightly as needed for Sleep 1/11/19   Elliott Hooper MD   ipratropium-albuterol (DUONEB) 0.5-2.5 (3) MG/3ML SOLN nebulizer solution Inhale 3 mLs into the lungs every 4 hours as needed for Shortness of Breath 1/11/19   Elliott Hooper MD   mometasone-formoterol [facility]) 100-5 MCG/ACT inhaler Inhale 2 puffs into the lungs 2 times daily 12/5/18   Lane Monge MD   pantoprazole (PROTONIX) 40 MG tablet Take 1 tablet by mouth 2 times daily (before meals) 11/13/18   Juliana Davila MD   folic acid (FOLVITE) 1 MG tablet Take 1 tablet by mouth daily 10/13/18   Bridgette Homans, MD   vitamin B-1 100 MG tablet Take 1 tablet by mouth daily 10/13/18   Bridgette Homans, MD   vitamin B-12 250 MCG tablet Take 1 tablet by mouth daily 10/13/18   Bridgette Homans, MD   gabapentin (NEURONTIN) 300 MG capsule Take 300 mg by mouth 3 times daily. Yan Pickering     Historical Provider, MD   Multiple Vitamins-Minerals (THERAPEUTIC MULTIVITAMIN-MINERALS) tablet Take 1 tablet by mouth daily 8/12/18   Janet Garcia MD REVIEW OF SYSTEMS    (2-9 systems for level 4, 10 or more for level 5)      Review of Systems   Constitutional: Negative for chills and fever. HENT: Positive for congestion. Negative for sore throat. Respiratory: Negative for cough and shortness of breath. Cardiovascular: Negative for chest pain. Gastrointestinal: Positive for abdominal pain and constipation. Negative for nausea and vomiting. Genitourinary: Positive for dysuria and frequency. Musculoskeletal: Negative for neck stiffness. Skin: Negative for rash. Neurological: Negative for headaches. PHYSICAL EXAM   (up to 7 for level 4, 8 or more for level 5)      INITIAL VITALS:   BP (!) 168/108   Pulse 112   Temp 98.5 °F (36.9 °C)   Resp 18   LMP 03/18/2015   SpO2 97%     Physical Exam  Vitals signs reviewed. Constitutional:       General: She is not in acute distress. Appearance: Normal appearance. She is well-developed. She is not toxic-appearing. HENT:      Head: Normocephalic and atraumatic. Eyes:      Extraocular Movements: Extraocular movements intact. Pupils: Pupils are equal, round, and reactive to light. Cardiovascular:      Rate and Rhythm: Normal rate and regular rhythm. Pulmonary:      Effort: Pulmonary effort is normal.      Breath sounds: Wheezing (expiratory) present. Abdominal:      General: There is distension (mild). Palpations: Abdomen is soft. Tenderness: There is abdominal tenderness. Comments: Soft abdomen     Genitourinary:     Rectum: Normal.      Comments: No impacted stool  Musculoskeletal:         General: Tenderness (L hip pain) present. Comments: Decreased L hip ROM   Skin:     General: Skin is warm and dry. Neurological:      Mental Status: She is alert.        DIFFERENTIAL  DIAGNOSIS     PLAN (LABS / IMAGING / EKG):  Orders Placed This Encounter   Procedures    XR HIP LEFT (2-3 VIEWS)    XR ABDOMEN (KUB) (SINGLE AP VIEW)    Urinalysis with Microscopic  Misc nursing order (specify)       MEDICATIONS ORDERED:  Orders Placed This Encounter   Medications    DISCONTD: glycerin (ADULT) suppository 2 g    acetaminophen (TYLENOL) tablet 650 mg    ibuprofen (ADVIL;MOTRIN) tablet 400 mg    docusate sodium (COLACE) 100 MG capsule     Sig: Take 1 capsule by mouth 2 times daily for 14 days     Dispense:  28 capsule     Refill:  0    ibuprofen (ADVIL;MOTRIN) 600 MG tablet     Sig: Take 1 tablet by mouth 4 times daily as needed for Pain     Dispense:  56 tablet     Refill:  0    acetaminophen (TYLENOL 8 HOUR) 650 MG extended release tablet     Sig: Take 1 tablet by mouth every 8 hours as needed for Pain     Dispense:  42 tablet     Refill:  0    senna (SENOKOT) 8.6 MG TABS tablet     Sig: Take 1 tablet by mouth daily for 14 days     Dispense:  14 tablet     Refill:  0       DIAGNOSTIC RESULTS / EMERGENCY DEPARTMENT COURSE / MDM   LAB RESULTS:  Results for orders placed or performed during the hospital encounter of 07/16/20   Urinalysis with Microscopic   Result Value Ref Range    Color, UA YELLOW YELLOW    Turbidity UA CLEAR CLEAR    Glucose, Ur NEGATIVE NEGATIVE    Bilirubin Urine NEGATIVE NEGATIVE    Ketones, Urine NEGATIVE NEGATIVE    Specific Gravity, UA 1.016 1.005 - 1.030    Urine Hgb NEGATIVE NEGATIVE    pH, UA 7.0 5.0 - 8.0    Protein, UA NEGATIVE NEGATIVE    Urobilinogen, Urine Normal Normal    Nitrite, Urine NEGATIVE NEGATIVE    Leukocyte Esterase, Urine SMALL (A) NEGATIVE    -          WBC, UA 2 TO 5 0 - 5 /HPF    RBC, UA 0 TO 2 0 - 4 /HPF    Casts UA  0 - 8 /LPF     2 TO 5 HYALINE Reference range defined for non-centrifuged specimen. Crystals, UA NOT REPORTED None /HPF    Epithelial Cells UA 0 TO 2 0 - 5 /HPF    Renal Epithelial, UA NOT REPORTED 0 /HPF    Bacteria, UA NOT REPORTED None    Mucus, UA NOT REPORTED None    Trichomonas, UA NOT REPORTED None    Amorphous, UA NOT REPORTED None    Other Observations UA NOT REPORTED NOT REQ.     Yeast, UA NOT 7/20/2020  at 10:30 am, arrive 5 minutes early and please wear a mask      DISCHARGE MEDICATIONS:  Discharge Medication List as of 7/16/2020 12:56 PM      START taking these medications    Details   !! docusate sodium (COLACE) 100 MG capsule Take 1 capsule by mouth 2 times daily for 14 days, Disp-28 capsule,R-0Print      ibuprofen (ADVIL;MOTRIN) 600 MG tablet Take 1 tablet by mouth 4 times daily as needed for Pain, Disp-56 tablet,R-0Print      acetaminophen (TYLENOL 8 HOUR) 650 MG extended release tablet Take 1 tablet by mouth every 8 hours as needed for Pain, Disp-42 tablet,R-0Print       !! - Potential duplicate medications found. Please discuss with provider.           Cassandra Schmidt DO  Emergency Medicine Resident    (Please note that portions of thisnote were completed with a voice recognition program.  Efforts were made to edit the dictations but occasionally words are mis-transcribed.)       Cassandra Schmidt DO  Resident  07/16/20 2525

## 2020-07-16 NOTE — ED NOTES
Pt reports she feels better   Pt had 2 small stools in bedside commode   Pt states she wants to leave       Conception ANTELMO Wing  07/16/20 [id number]

## 2020-07-16 NOTE — ED PROVIDER NOTES
[facility]     Emergency Department     Faculty Attestation    I performed a history and physical examination of the patient and discussed management with the resident. I have reviewed and agree with the residents findings including all diagnostic interpretations, and treatment plans as written. Any areas of disagreement are noted on the chart. I was personally present for the key portions of any procedures. I have documented in the chart those procedures where I was not present during the key portions. I have reviewed the emergency nurses triage note. I agree with the chief complaint, past medical history, past surgical history, allergies, medications, social and family history as documented unless otherwise noted below. Documentation of the HPI, Physical Exam and Medical Decision Making performed by gladysibmio is based on my personal performance of the HPI, PE and MDM. For Physician Assistant/ Nurse Practitioner cases/documentation I have personally evaluated this patient and have completed at least one if not all key elements of the E/M (history, physical exam, and MDM). Additional findings are as noted. H/o left hip pain, ongoing, ambulates with walker, previous R hip arthoscopy. Pain continues  Also c/o urinary frequency, and dysuria. No nausea or vomiting      Patient with known Avascular necrosis of the L hip, seenon MRI in 6/2020, ortho evaluated her and plan for outpatient follow up with planning for hip arthroscopy. Discussed this with patient, and she reports she really doesn't want to have another surgery  But pain in hip is otherwise unchanged, and no new trauma to the area, however patient did tell RN triage that she did fall 2 weeks ago  Also uti symptoms, and plan for UA.   She does have lower abdominal pain on exam, non localizing to any quadrant  She also reports constipation, and having to manually disimpact herself to get poop out  Will plan on rectal exam. And bowel regimen    Martínez Chowdhury D.O, M.P.H  Attending Emergency Medicine Physician         Martínez Chowdhury, DO  07/16/20 [id number]

## 2020-07-17 ENCOUNTER — TELEPHONE (OUTPATIENT)
Dept: ORTHOPEDIC SURGERY | Age: 65
End: 2020-07-17

## 2020-07-19 ENCOUNTER — APPOINTMENT (OUTPATIENT)
Dept: GENERAL RADIOLOGY | Age: 65
End: 2020-07-19
Payer: MEDICARE

## 2020-07-19 ENCOUNTER — APPOINTMENT (OUTPATIENT)
Dept: CT IMAGING | Age: 65
End: 2020-07-19
Payer: MEDICARE

## 2020-07-19 ENCOUNTER — HOSPITAL ENCOUNTER (EMERGENCY)
Age: 65
Discharge: LEFT BEFORE DISCHARGE | End: 2020-07-19
Attending: EMERGENCY MEDICINE
Payer: MEDICARE

## 2020-07-19 VITALS
DIASTOLIC BLOOD PRESSURE: 64 MMHG | TEMPERATURE: 98.6 F | WEIGHT: 103 LBS | SYSTOLIC BLOOD PRESSURE: 95 MMHG | RESPIRATION RATE: 18 BRPM | HEART RATE: 96 BPM | OXYGEN SATURATION: 94 % | HEIGHT: 62 IN | BODY MASS INDEX: 18.95 KG/M2

## 2020-07-19 LAB
-: ABNORMAL
-: NORMAL
-: NORMAL
ABSOLUTE EOS #: 0.15 K/UL (ref 0–0.44)
ABSOLUTE IMMATURE GRANULOCYTE: 0.33 K/UL (ref 0–0.3)
ABSOLUTE LYMPH #: 1.22 K/UL (ref 1.1–3.7)
ABSOLUTE MONO #: 1.39 K/UL (ref 0.1–1.2)
AMORPHOUS: ABNORMAL
BACTERIA: ABNORMAL
BASOPHILS # BLD: 0 % (ref 0–2)
BASOPHILS ABSOLUTE: 0.05 K/UL (ref 0–0.2)
BILIRUBIN URINE: ABNORMAL
BNP INTERPRETATION: ABNORMAL
CASTS UA: ABNORMAL /LPF (ref 0–8)
COLOR: ABNORMAL
CRYSTALS, UA: ABNORMAL /HPF
D-DIMER QUANTITATIVE: 1.9 MG/L FEU
DIFFERENTIAL TYPE: ABNORMAL
EOSINOPHILS RELATIVE PERCENT: 1 % (ref 1–4)
EPITHELIAL CELLS UA: ABNORMAL /HPF (ref 0–5)
FIBRINOGEN: 577 MG/DL (ref 140–420)
GLUCOSE URINE: NEGATIVE
HCT VFR BLD CALC: 50.5 % (ref 36.3–47.1)
HEMOGLOBIN: 15 G/DL (ref 11.9–15.1)
IMMATURE GRANULOCYTES: 2 %
INR BLD: 0.9
KETONES, URINE: NEGATIVE
LEUKOCYTE ESTERASE, URINE: ABNORMAL
LYMPHOCYTES # BLD: 7 % (ref 24–43)
MCH RBC QN AUTO: 32.7 PG (ref 25.2–33.5)
MCHC RBC AUTO-ENTMCNC: 29.7 G/DL (ref 28.4–34.8)
MCV RBC AUTO: 110 FL (ref 82.6–102.9)
MONOCYTES # BLD: 8 % (ref 3–12)
MUCUS: ABNORMAL
NITRITE, URINE: NEGATIVE
NRBC AUTOMATED: 0 PER 100 WBC
OTHER OBSERVATIONS UA: ABNORMAL
PARTIAL THROMBOPLASTIN TIME: 19.5 SEC (ref 20.5–30.5)
PDW BLD-RTO: 17.6 % (ref 11.8–14.4)
PH UA: 6 (ref 5–8)
PLATELET # BLD: 336 K/UL (ref 138–453)
PLATELET ESTIMATE: ABNORMAL
PMV BLD AUTO: 9 FL (ref 8.1–13.5)
PRO-BNP: 695 PG/ML
PROTEIN UA: ABNORMAL
PROTHROMBIN TIME: 9.5 SEC (ref 9–12)
RBC # BLD: 4.59 M/UL (ref 3.95–5.11)
RBC # BLD: ABNORMAL 10*6/UL
RBC UA: ABNORMAL /HPF (ref 0–4)
REASON FOR REJECTION: NORMAL
REASON FOR REJECTION: NORMAL
RENAL EPITHELIAL, UA: ABNORMAL /HPF
SARS-COV-2, PCR: NORMAL
SARS-COV-2, RAPID: NOT DETECTED
SARS-COV-2: NORMAL
SEG NEUTROPHILS: 82 % (ref 36–65)
SEGMENTED NEUTROPHILS ABSOLUTE COUNT: 14.69 K/UL (ref 1.5–8.1)
SOURCE: NORMAL
SPECIFIC GRAVITY UA: 1.02 (ref 1–1.03)
TRICHOMONAS: ABNORMAL
TROPONIN INTERP: ABNORMAL
TROPONIN INTERP: ABNORMAL
TROPONIN T: ABNORMAL NG/ML
TROPONIN T: ABNORMAL NG/ML
TROPONIN, HIGH SENSITIVITY: 30 NG/L (ref 0–14)
TROPONIN, HIGH SENSITIVITY: 31 NG/L (ref 0–14)
TURBIDITY: CLEAR
URINE HGB: NEGATIVE
UROBILINOGEN, URINE: NORMAL
WBC # BLD: 17.8 K/UL (ref 3.5–11.3)
WBC # BLD: ABNORMAL 10*3/UL
WBC UA: ABNORMAL /HPF (ref 0–5)
YEAST: ABNORMAL
ZZ NTE CLEAN UP: ORDERED TEST: NORMAL
ZZ NTE CLEAN UP: ORDERED TEST: NORMAL
ZZ NTE WITH NAME CLEAN UP: SPECIMEN SOURCE: NORMAL
ZZ NTE WITH NAME CLEAN UP: SPECIMEN SOURCE: NORMAL

## 2020-07-19 PROCEDURE — 85379 FIBRIN DEGRADATION QUANT: CPT

## 2020-07-19 PROCEDURE — 81001 URINALYSIS AUTO W/SCOPE: CPT

## 2020-07-19 PROCEDURE — 83880 ASSAY OF NATRIURETIC PEPTIDE: CPT

## 2020-07-19 PROCEDURE — 74019 RADEX ABDOMEN 2 VIEWS: CPT

## 2020-07-19 PROCEDURE — 85610 PROTHROMBIN TIME: CPT

## 2020-07-19 PROCEDURE — 85384 FIBRINOGEN ACTIVITY: CPT

## 2020-07-19 PROCEDURE — 6360000002 HC RX W HCPCS: Performed by: STUDENT IN AN ORGANIZED HEALTH CARE EDUCATION/TRAINING PROGRAM

## 2020-07-19 PROCEDURE — 6370000000 HC RX 637 (ALT 250 FOR IP): Performed by: STUDENT IN AN ORGANIZED HEALTH CARE EDUCATION/TRAINING PROGRAM

## 2020-07-19 PROCEDURE — 99284 EMERGENCY DEPT VISIT MOD MDM: CPT

## 2020-07-19 PROCEDURE — 85730 THROMBOPLASTIN TIME PARTIAL: CPT

## 2020-07-19 PROCEDURE — U0002 COVID-19 LAB TEST NON-CDC: HCPCS

## 2020-07-19 PROCEDURE — 93005 ELECTROCARDIOGRAM TRACING: CPT | Performed by: STUDENT IN AN ORGANIZED HEALTH CARE EDUCATION/TRAINING PROGRAM

## 2020-07-19 PROCEDURE — 94640 AIRWAY INHALATION TREATMENT: CPT

## 2020-07-19 PROCEDURE — 96365 THER/PROPH/DIAG IV INF INIT: CPT

## 2020-07-19 PROCEDURE — 96366 THER/PROPH/DIAG IV INF ADDON: CPT

## 2020-07-19 PROCEDURE — 96375 TX/PRO/DX INJ NEW DRUG ADDON: CPT

## 2020-07-19 PROCEDURE — 85025 COMPLETE CBC W/AUTO DIFF WBC: CPT

## 2020-07-19 PROCEDURE — 71045 X-RAY EXAM CHEST 1 VIEW: CPT

## 2020-07-19 PROCEDURE — 84484 ASSAY OF TROPONIN QUANT: CPT

## 2020-07-19 RX ORDER — ACETAMINOPHEN 650 MG/1
650 SUPPOSITORY RECTAL EVERY 6 HOURS PRN
Status: DISCONTINUED | OUTPATIENT
Start: 2020-07-19 | End: 2020-07-19 | Stop reason: HOSPADM

## 2020-07-19 RX ORDER — HEPARIN SODIUM 10000 [USP'U]/100ML
12 INJECTION, SOLUTION INTRAVENOUS CONTINUOUS
Status: DISCONTINUED | OUTPATIENT
Start: 2020-07-19 | End: 2020-07-19 | Stop reason: HOSPADM

## 2020-07-19 RX ORDER — HEPARIN SODIUM 1000 [USP'U]/ML
30 INJECTION, SOLUTION INTRAVENOUS; SUBCUTANEOUS PRN
Status: DISCONTINUED | OUTPATIENT
Start: 2020-07-19 | End: 2020-07-19 | Stop reason: HOSPADM

## 2020-07-19 RX ORDER — HEPARIN SODIUM 1000 [USP'U]/ML
60 INJECTION, SOLUTION INTRAVENOUS; SUBCUTANEOUS PRN
Status: DISCONTINUED | OUTPATIENT
Start: 2020-07-19 | End: 2020-07-19 | Stop reason: HOSPADM

## 2020-07-19 RX ORDER — ACETAMINOPHEN 325 MG/1
650 TABLET ORAL EVERY 6 HOURS PRN
Status: DISCONTINUED | OUTPATIENT
Start: 2020-07-19 | End: 2020-07-19 | Stop reason: HOSPADM

## 2020-07-19 RX ORDER — ALBUTEROL SULFATE 90 UG/1
2 AEROSOL, METERED RESPIRATORY (INHALATION) EVERY 6 HOURS PRN
Status: DISCONTINUED | OUTPATIENT
Start: 2020-07-19 | End: 2020-07-19 | Stop reason: HOSPADM

## 2020-07-19 RX ORDER — HEPARIN SODIUM 1000 [USP'U]/ML
60 INJECTION, SOLUTION INTRAVENOUS; SUBCUTANEOUS ONCE
Status: COMPLETED | OUTPATIENT
Start: 2020-07-19 | End: 2020-07-19

## 2020-07-19 RX ORDER — FENTANYL CITRATE 50 UG/ML
50 INJECTION, SOLUTION INTRAMUSCULAR; INTRAVENOUS ONCE
Status: COMPLETED | OUTPATIENT
Start: 2020-07-19 | End: 2020-07-19

## 2020-07-19 RX ADMIN — HEPARIN SODIUM AND DEXTROSE 12 UNITS/KG/HR: 10000; 5 INJECTION INTRAVENOUS at 10:32

## 2020-07-19 RX ADMIN — ALBUTEROL SULFATE 2 PUFF: 90 AEROSOL, METERED RESPIRATORY (INHALATION) at 08:53

## 2020-07-19 RX ADMIN — FENTANYL CITRATE 50 MCG: 50 INJECTION, SOLUTION INTRAMUSCULAR; INTRAVENOUS at 09:31

## 2020-07-19 RX ADMIN — HEPARIN SODIUM 2800 UNITS: 1000 INJECTION, SOLUTION INTRAVENOUS; SUBCUTANEOUS at 10:32

## 2020-07-19 ASSESSMENT — ENCOUNTER SYMPTOMS
ABDOMINAL PAIN: 1
ABDOMINAL DISTENTION: 1
VOMITING: 0
SHORTNESS OF BREATH: 0
SORE THROAT: 0
NAUSEA: 0
BACK PAIN: 1
CONSTIPATION: 1
WHEEZING: 1
DIARRHEA: 0
BLOOD IN STOOL: 0
COUGH: 1

## 2020-07-19 ASSESSMENT — PAIN DESCRIPTION - LOCATION: LOCATION: BUTTOCKS;BACK

## 2020-07-19 ASSESSMENT — PAIN DESCRIPTION - DESCRIPTORS: DESCRIPTORS: ACHING

## 2020-07-19 ASSESSMENT — PAIN SCALES - GENERAL: PAINLEVEL_OUTOF10: 10

## 2020-07-19 NOTE — ED NOTES
Pt refusing cardiac monitoring leads. Pt on spo2 and BP. Pt informed on necessity for cardiac monitoring, continues to deny. \"I dont need that fuckin shit\". Will continue to monitor.      Timbo Contreras RN  07/19/20 [id number]

## 2020-07-19 NOTE — ED PROVIDER NOTES
complaints. Concern for possible constipation, will get abdominal series. Also get chest x-ray has crackles throughout. Does have a history of CHF. Concern for possible CHF versus CoVID-19. Patient is desaturating to 88%, will get a CoVID-19 swab plan for admission given the elevated troponins. EKG changes as noted below. Cardiology consult. Heparin drip    EKG:  EKG Interpretation    Interpreted by emergency department physician    Rhythm: normal sinus   Rate: normal  Axis: normal  Ectopy: none  Conduction: normal  ST Segments: nonspecific changes  T Waves: non specific changes  Q Waves: nonspecific    Clinical Impression: Normal sinus rhythm with nonstick ST-T wave findings. Has mild depressions in the inferior leads that are old when compared EKG on 4/24/2020. There is also new T wave inversions in V4 and V3, concerning for possible ischemia. Kameron Serrato    EKG Interpretation - repeat EKG    Interpreted by emergency department physician    Clinical Impression: Sinus rhythm with PVCs. Worsening depression in lead III, but no other elevations. There is continued inversions in the lateral leads with minor depression.     Kameron Serrato    CRITICAL CARE: None     Kameron Serrato MD  Attending Emergency Physician        Kameron Serrato MD  07/19/20 [id number]

## 2020-07-19 NOTE — ED NOTES
Patient in [facility], requesting assistance in setting up cab.   She plans to call [employer] for transportation home     Marylene Rasp, MSW, [facility]  07/19/20 2568

## 2020-07-19 NOTE — ED PROVIDER NOTES
Hyperlipidemia, Hypertension, Migraine, Moderate COPD (chronic obstructive pulmonary disease) (HCC), Multiple transfusions, Neurogenic dysphagia, Osteoarthritis, Pneumonia, Scoliosis, and Substance abuse ([facility] [place] [address] has a past surgical history that includes  section; LEEP (); Upper gastrointestinal endoscopy (); Gastric bypass surgery; Appendectomy (2017); laparoscopic appendectomy (N/A, 3/27/2017); Upper gastrointestinal endoscopy (2018); Upper gastrointestinal endoscopy (N/A, 2018); pr colonoscopy w/biopsy single/multiple (N/A, 11/10/2018); pr egd transoral biopsy single/multiple (N/A, 2018); hip surgery (Right, 2019); and Total hip arthroplasty (Right, 2019).     Social History     Socioeconomic History    Marital status: Single     Spouse name: Not on file    Number of children: Not on file    Years of education: Not on file    Highest education level: Not on file   Occupational History    Not on file   Social Needs    Financial resource strain: Not on file    Food insecurity     Worry: Not on file     Inability: Not on file    Transportation needs     Medical: Not on file     Non-medical: Not on file   Tobacco Use    Smoking status: Former Smoker     Packs/day: 1.00     Years: 40.00     Pack years: 40.00     Types: Cigarettes     Start date:      Last attempt to quit: 2018     Years since quittin.7    Smokeless tobacco: Never Used   Substance and Sexual Activity    Alcohol use: No     Alcohol/week: 0.0 standard drinks    Drug use: No     Types: IV, Cocaine     Comment: past history    Sexual activity: Not on file   Lifestyle    Physical activity     Days per week: Not on file     Minutes per session: Not on file    Stress: Not on file   Relationships    Social connections     Talks on phone: Not on file     Gets together: Not on file     Attends Islam service: Not on file     Active member of club or organization: Not on file 100 mg by mouth 2 times daily 8/22/19   Cesar Monge MD   [facility] [place]. Devices [facility]) MISC 1 Device by Does not apply route once for 1 dose 8/5/19 8/5/19  Desmond Councilman, PA-C   hydrOXYzine (ATARAX) 25 MG tablet Take 1 tablet by mouth every 8 hours as needed for Itching 8/5/19   Desmond Councilman, PA-C   benzonatate (TESSALON PERLES) 100 MG capsule Take 1 capsule by mouth 3 times daily as needed for Cough 3/11/19   Stan Lopez DO   carvedilol (COREG) 6.25 MG tablet Take 1 tablet by mouth 2 times daily (with meals) 2/7/19   Johnnie Luna MD   ferrous sulfate (MELISSA-HAO) 325 (65 Fe) MG tablet Take 1 tablet by mouth daily 2/7/19   Johnnie Luna MD   melatonin 1 MG tablet Take 1 tablet by mouth nightly as needed for Sleep 1/11/19   Alejandrina Flores MD   ipratropium-albuterol (DUONEB) 0.5-2.5 (3) MG/3ML SOLN nebulizer solution Inhale 3 mLs into the lungs every 4 hours as needed for Shortness of Breath 1/11/19   Alejandrina Flores MD   mometasone-formoterol [facility]) 100-5 MCG/ACT inhaler Inhale 2 puffs into the lungs 2 times daily 12/5/18   Santo Pool MD   pantoprazole (PROTONIX) 40 MG tablet Take 1 tablet by mouth 2 times daily (before meals) 11/13/18   Kimberly Epps MD   folic acid (FOLVITE) 1 MG tablet Take 1 tablet by mouth daily 10/13/18   Casey Jeffrey MD   vitamin B-1 100 MG tablet Take 1 tablet by mouth daily 10/13/18   Casey Jeffrey MD   vitamin B-12 250 MCG tablet Take 1 tablet by mouth daily 10/13/18   Casey Jeffrey MD   gabapentin (NEURONTIN) 300 MG capsule Take 300 mg by mouth 3 times daily. Cee Dye MD   Multiple Vitamins-Minerals (THERAPEUTIC MULTIVITAMIN-MINERALS) tablet Take 1 tablet by mouth daily 8/12/18   Harvis Oka, MD       REVIEW OF SYSTEMS    (2-9 systems for level 4, 10 or more for level 5)      Review of Systems   Constitutional: Negative for chills, diaphoresis, fatigue and fever. HENT: Negative for congestion and sore throat.     Eyes: Negative for visual disturbance. Respiratory: Positive for cough and wheezing. Negative for shortness of breath. Cardiovascular: Negative for chest pain, palpitations and leg swelling. Gastrointestinal: Positive for abdominal distention, abdominal pain and constipation. Negative for blood in stool, diarrhea, nausea and vomiting. Endocrine: Negative for polydipsia, polyphagia and polyuria. Genitourinary: Negative for difficulty urinating, dysuria, flank pain, frequency, hematuria, vaginal bleeding and vaginal discharge. Musculoskeletal: Positive for back pain (only with BM and urinating). Negative for arthralgias and myalgias. Skin: Negative for rash and wound. Neurological: Negative for weakness, light-headedness and numbness. PHYSICAL EXAM   (up to 7 for level 4, 8 or more for level 5)      INITIAL VITALS:   BP 95/64   Pulse 96   Temp 98.6 °F (37 °C) (Oral)   Resp 18   Ht 5' 2\" (1.575 m)   Wt 103 lb (46.7 kg)   LMP 03/18/2015   SpO2 90%   BMI 18.84 kg/m²     Physical Exam  Vitals signs and nursing note reviewed. Constitutional:       General: She is in acute distress. Appearance: Normal appearance. She is well-developed and normal weight. She is ill-appearing. She is not diaphoretic. HENT:      Head: Normocephalic and atraumatic. Right Ear: External ear normal.      Left Ear: External ear normal.      Nose: Nose normal.      Mouth/Throat:      Mouth: Mucous membranes are moist.   Eyes:      Extraocular Movements: Extraocular movements intact. Conjunctiva/sclera: Conjunctivae normal.   Neck:      Musculoskeletal: Normal range of motion and neck supple. No neck rigidity. Vascular: No JVD. Trachea: No tracheal deviation. Cardiovascular:      Rate and Rhythm: Normal rate and regular rhythm. Pulses: Normal pulses. Heart sounds: Normal heart sounds, S1 normal and S2 normal. No murmur. No friction rub. No gallop. Pulmonary:      Effort: Respiratory distress present. Breath sounds: Rhonchi (b/l upper lobes) and rales (b/l lower lobes) present. Abdominal:      General: There is distension. Palpations: Abdomen is soft. Tenderness: There is abdominal tenderness (diffuse mild tenderness). There is no guarding or rebound. Musculoskeletal: Normal range of motion. General: No swelling or tenderness (b/l calves are non-tender). Cervical back: She exhibits no tenderness, no bony tenderness and no deformity. Thoracic back: She exhibits no tenderness, no bony tenderness and no deformity. Lumbar back: She exhibits no tenderness, no bony tenderness and no deformity. Skin:     General: Skin is warm and dry. Capillary Refill: Capillary refill takes less than 2 seconds. Neurological:      Mental Status: She is alert and oriented to person, place, and time. Motor: No abnormal muscle tone.          DIFFERENTIAL  DIAGNOSIS     PLAN (LABS / IMAGING / EKG):  Orders Placed This Encounter   Procedures    XR CHEST PORTABLE    XR ABDOMEN (2 VIEWS)    CT CHEST ABDOMEN PELVIS WO CONTRAST    CBC Auto Differential    Troponin    Brain Natriuretic Peptide    Urinalysis with Microscopic    COVID-19    Protime-INR    APTT    Fibrinogen    D-Dimer, Quantitative    SPECIMEN REJECTION    CBC    CBC    APTT    SPECIMEN REJECTION    Basic Metabolic Panel w/ Reflex to MG    PREVIOUS SPECIMEN    Straight cath    PPE Instructions    Inpatient consult to Cardiology    Droplet Plus Isolation    EKG 12 Lead    EKG 12 Lead       MEDICATIONS ORDERED:  Orders Placed This Encounter   Medications    albuterol sulfate  (90 Base) MCG/ACT inhaler 2 puff    OR Linked Order Group     acetaminophen (TYLENOL) tablet 650 mg     acetaminophen (TYLENOL) suppository 650 mg    enoxaparin (LOVENOX) injection 30 mg    fentaNYL (SUBLIMAZE) injection 50 mcg    heparin (porcine) injection 2,800 Units    heparin (porcine) injection 2,800 Units    TECHNOLOGIST PROVIDED HISTORY: shortness of breath, crackles in lower lobes Reason for Exam: shortness of breath, crackles in lower lobes FINDINGS: Pulmonary vascular congestion and perihilar edema. No effusion or pneumothorax. The heart is enlarged. Findings suggestive of pulmonary edema. A superimposed infectious process is not excluded. Xr Abdomen (2 Views)    Result Date: 7/19/2020  EXAMINATION: TWO XRAY VIEWS OF THE ABDOMEN 7/19/2020 9:58 am COMPARISON: 07/16/2020 HISTORY: ORDERING SYSTEM PROVIDED HISTORY: abdominal distention TECHNOLOGIST PROVIDED HISTORY: abdominal distention Reason for Exam: abdominal distention FINDINGS: The bowel gas pattern is nonspecific. Moderate colonic stool diffusely. Loops of small bowel are not dilated. No suspicious calcifications. Probably avascular necrosis with articular collapse of the left femoral head. 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 Constipation       EKG  First EKG reveals normal sinus rhythm with ventricular rate of 81 bpm.  Normal axis. CT interval 148 ms, QRS duration 76 ms, and QT/QTC of 320/293 ms. Good R wave progression. No ST elevations or depressions. Diffuse inverted T waves in V3 V4 V5 V6 to 3 and aVF. No Q waves. Nonspecific EKG with new T wave inversions in V 4-5 from prior. Repeat EKG reveals normal sinus rhythm with a ventricular rate of 86 bpm.  Normal axis. CT interval 150 ms, cures duration of 70 mg, and QT/QTc 334/339 ms. There are progression. No ST elevations or depressions. Diffuse inverted T waves in V3, V4, V5 6, and inferior leads. Nonspecific EKG is unchanged from prior EKG taken earlier today.       Lormackenzie Dilip      All EKG's are interpreted by the Emergency Department Physician who either signs or Co-signs this chart in the absence of a cardiologist.    EMERGENCY DEPARTMENT COURSE:  Patient is a 24-year-old male presenting for abdominal pain, constipation and shortness of breath. Patient appears to be in acute respiratory distress and ill-appearing. Initial vitals were stable, the patient did have an SPO2 of 90% out in triage on room air. Patient has bilateral rales in the lower lobes and bilateral rhonchi in the upper lobes. Regular rate and rhythm normal S1-S2 heart sounds. Abdomen is massively distended diffusely tender to palpation with no guarding/rigidity/rebound tenderness. Refills less than 2 seconds. Concern for ACS/MI, COVID, PE, pneumonia, constipation, electrolyte abnormality, arrhythmia, acute CHF, and anemia. Will order abdominal series x-rays, chest pain labs, repeat troponin and IV fentanyl. ED Course as of Jul 19 1151   Sun Jul 19, 2020   0919 Troponin, High Sensitivity(!): 31 [CS]   0919 WBC(!): 17.8 [CS]   0952 Concern for CHF. As patient is blood pressure is under 888 systolic will not start the nitro drip. Pro-BNP(!): 695 [CS]   1033 SARS-CoV-2, Rapid: Not Detected [CS]   5654 Will treat for UTI. Leukocyte Esterase, Urine(!): SMALL [CS]   1125 Talked to cards who recommends no further work-up to keep the heparin drip for an STEMI.   [CS]   3278 Patient is now refusing any additional IV sticks or IV access. Do not currently have any IVs appropriate for CT PE IV contrast study. At this time patient was made aware of the risks, such as worsening heart function and death, but is still refusing additional IV access. We will continue the heparin drip for the confirmed NSTEMI.     [CS]   9319 X-ray of the abdomen reveals constipation, and the same noted left avascular necrosis. [CS]   1150 Patient has no need for that she will be eating until after cardiology evaluates her. Patient became quite irate did not give any more blood or have any scans done. Patient notes that she wants to leave and does not care if she is can have a heart attack or PE or have any life-threatening conditions and die because she cannot eat. In addition patient was told to take her atorvastatin as prescribed patient to buy over-the-counter MiraLAX for constipation. Should be noted patient became irate when she found that she would not receive any narcotic pain medications for her back pain that she was not currently having presentation as she was leaving AMA. [CS]      ED Course User Index  [CS] Collins Suarez DO         PROCEDURES:  None    CONSULTS:  IP CONSULT TO CARDIOLOGY    CRITICAL CARE:  None    FINAL IMPRESSION      1. Constipation, unspecified constipation type    2. NSTEMI (non-ST elevated myocardial infarction) ([facility] [address]    3. Elevated d-dimer          DISPOSITION / PLAN     DISPOSITION [employer] 07/19/2020 12:01:36 PM      PATIENT REFERRED TO:  No follow-up provider specified.     DISCHARGE MEDICATIONS:  Discharge Medication List as of 7/19/2020 12:01 PM          Collins Suarez DO  Emergency Medicine Resident    (Please note that portions of thisnote were completed with a voice recognition program.  Efforts were made to edit the dictations but occasionally words are mis-transcribed.)       Collins Suarez DO  Resident  07/19/20 0868

## 2020-07-19 NOTE — ED NOTES
Pt extremely uncooperative w/ care, continues to ask for meal. NAD w/ rr even and unlabored. Pt has removed all monitoring equipment. MD aware. Will continue to monitor.      David Ibanez RN  07/19/20 2454

## 2020-07-19 NOTE — ED NOTES
IV removed per pt request and demand. Pt states, \"ill just go home and eat then\". NAD w/ rr even and unlabored. Dr. Jessica Arora @ bedside and Debbi Light has witnessed him explain AMA to pt and alls risks. Pt agrees and verbally acknowledges understanding. Pt signs out AMA witnessed by dr Ubaldo Brunner and Debbi Light.      Jaycee Milligan RN  07/19/20 0971

## 2020-07-20 LAB
EKG ATRIAL RATE: 86 BPM
EKG ATRIAL RATE: 91 BPM
EKG P AXIS: 40 DEGREES
EKG P AXIS: 49 DEGREES
EKG P-R INTERVAL: 148 MS
EKG P-R INTERVAL: 150 MS
EKG Q-T INTERVAL: 320 MS
EKG Q-T INTERVAL: 334 MS
EKG QRS DURATION: 66 MS
EKG QRS DURATION: 72 MS
EKG QTC CALCULATION (BAZETT): 393 MS
EKG QTC CALCULATION (BAZETT): 399 MS
EKG R AXIS: 48 DEGREES
EKG R AXIS: 56 DEGREES
EKG T AXIS: -104 DEGREES
EKG T AXIS: -94 DEGREES
EKG VENTRICULAR RATE: 86 BPM
EKG VENTRICULAR RATE: 91 BPM

## 2020-07-20 PROCEDURE — 93010 ELECTROCARDIOGRAM REPORT: CPT | Performed by: INTERNAL MEDICINE

## 2020-07-24 ENCOUNTER — HOSPITAL ENCOUNTER (EMERGENCY)
Age: 65
Discharge: HOME | End: 2020-07-24
Attending: EMERGENCY MEDICINE
Payer: MEDICARE

## 2020-07-24 ENCOUNTER — APPOINTMENT (OUTPATIENT)
Dept: GENERAL RADIOLOGY | Age: 65
End: 2020-07-24
Payer: MEDICARE

## 2020-07-24 VITALS
HEIGHT: 59 IN | SYSTOLIC BLOOD PRESSURE: 171 MMHG | HEART RATE: 78 BPM | RESPIRATION RATE: 16 BRPM | OXYGEN SATURATION: 100 % | DIASTOLIC BLOOD PRESSURE: 106 MMHG | WEIGHT: 102 LBS | TEMPERATURE: 98.8 F | BODY MASS INDEX: 20.56 KG/M2

## 2020-07-24 PROCEDURE — 6370000000 HC RX 637 (ALT 250 FOR IP): Performed by: EMERGENCY MEDICINE

## 2020-07-24 PROCEDURE — 99284 EMERGENCY DEPT VISIT MOD MDM: CPT

## 2020-07-24 PROCEDURE — 71046 X-RAY EXAM CHEST 2 VIEWS: CPT

## 2020-07-24 PROCEDURE — 6370000000 HC RX 637 (ALT 250 FOR IP): Performed by: GENERAL PRACTICE

## 2020-07-24 PROCEDURE — 94640 AIRWAY INHALATION TREATMENT: CPT

## 2020-07-24 PROCEDURE — 94664 DEMO&/EVAL PT USE INHALER: CPT

## 2020-07-24 RX ORDER — DOXYCYCLINE 100 MG/1
100 TABLET ORAL 2 TIMES DAILY
Qty: 20 TABLET | Refills: 0 | Status: ON HOLD | OUTPATIENT
Start: 2020-07-24 | End: 2020-08-02 | Stop reason: HOSPADM

## 2020-07-24 RX ORDER — ALBUTEROL SULFATE 2.5 MG/3ML
15 SOLUTION RESPIRATORY (INHALATION)
Status: DISCONTINUED | OUTPATIENT
Start: 2020-07-24 | End: 2020-07-24

## 2020-07-24 RX ORDER — ALBUTEROL SULFATE 2.5 MG/3ML
2.5 SOLUTION RESPIRATORY (INHALATION)
Status: DISCONTINUED | OUTPATIENT
Start: 2020-07-24 | End: 2020-07-24

## 2020-07-24 RX ORDER — IPRATROPIUM BROMIDE AND ALBUTEROL SULFATE 2.5; .5 MG/3ML; MG/3ML
1 SOLUTION RESPIRATORY (INHALATION) EVERY 4 HOURS PRN
Status: DISCONTINUED | OUTPATIENT
Start: 2020-07-24 | End: 2020-07-24 | Stop reason: HOSPADM

## 2020-07-24 RX ORDER — ACETAMINOPHEN 500 MG
1000 TABLET ORAL ONCE
Status: COMPLETED | OUTPATIENT
Start: 2020-07-24 | End: 2020-07-24

## 2020-07-24 RX ADMIN — ACETAMINOPHEN 1000 MG: 500 TABLET ORAL at 15:47

## 2020-07-24 RX ADMIN — IPRATROPIUM BROMIDE AND ALBUTEROL SULFATE 1 AMPULE: .5; 3 SOLUTION RESPIRATORY (INHALATION) at 15:14

## 2020-07-24 ASSESSMENT — ENCOUNTER SYMPTOMS
COUGH: 1
EYES NEGATIVE: 1
SHORTNESS OF BREATH: 1
ABDOMINAL PAIN: 0

## 2020-07-24 ASSESSMENT — PAIN SCALES - GENERAL: PAINLEVEL_OUTOF10: 9

## 2020-07-24 NOTE — ED PROVIDER NOTES
section; LEEP (2002); Upper gastrointestinal endoscopy (); Gastric bypass surgery; Appendectomy (2017); laparoscopic appendectomy (N/A, 3/27/2017); Upper gastrointestinal endoscopy (2018); Upper gastrointestinal endoscopy (N/A, 2018); pr colonoscopy w/biopsy single/multiple (N/A, 11/10/2018); pr egd transoral biopsy single/multiple (N/A, 2018); hip surgery (Right, 2019); and Total hip arthroplasty (Right, 2019).       Social History     Socioeconomic History    Marital status: Single     Spouse name: Not on file    Number of children: Not on file    Years of education: Not on file    Highest education level: Not on file   Occupational History    Not on file   Social Needs    Financial resource strain: Not on file    Food insecurity     Worry: Not on file     Inability: Not on file    Transportation needs     Medical: Not on file     Non-medical: Not on file   Tobacco Use    Smoking status: Former Smoker     Packs/day: 1.00     Years: 40.00     Pack years: 40.00     Types: Cigarettes     Start date:      Last attempt to quit: 2018     Years since quittin.7    Smokeless tobacco: Never Used   Substance and Sexual Activity    Alcohol use: No     Alcohol/week: 0.0 standard drinks    Drug use: No     Types: IV, Cocaine     Comment: past history    Sexual activity: Not on file   Lifestyle    Physical activity     Days per week: Not on file     Minutes per session: Not on file    Stress: Not on file   Relationships    Social connections     Talks on phone: Not on file     Gets together: Not on file     Attends Congregation service: Not on file     Active member of club or organization: Not on file     Attends meetings of clubs or organizations: Not on file     Relationship status: Not on file    Intimate partner violence     Fear of current or ex partner: Not on file     Emotionally abused: Not on file     Physically abused: Not on file     Forced sexual activity: Not on file   Other Topics Concern    Not on file   Social History Narrative    Not on file       Family History   Problem Relation Age of Onset    Breast Cancer Mother     Heart Disease Father        Allergies:  Aspirin and Keflet [cephalexin]    Home Medications:  Prior to Admission medications    Medication Sig Start Date End Date Taking?  Authorizing Provider   doxycycline monohydrate (ADOXA) 100 MG tablet Take 1 tablet by mouth 2 times daily for 10 days 7/24/20 8/3/20 Yes Liana Sage,    docusate sodium (COLACE) 100 MG capsule Take 1 capsule by mouth 2 times daily for 14 days 7/16/20 7/30/20 Yes Randall Mosher, DO   ibuprofen (ADVIL;MOTRIN) 600 MG tablet Take 1 tablet by mouth 4 times daily as needed for Pain 7/16/20 7/30/20 Yes Randall Mosher, DO   senna (SENOKOT) 8.6 MG TABS tablet Take 1 tablet by mouth daily for 14 days 7/16/20 7/30/20 Yes Randall Mosher, DO   diclofenac (VOLTAREN) 50 MG EC tablet Take 1 tablet by mouth 2 times daily 4/6/20  Yes Chintan Gong, DO   amitriptyline (ELAVIL) 10 MG tablet Take 1 tablet by mouth nightly 1/31/20  Yes Ml Irons, DO   atorvastatin (LIPITOR) 40 MG tablet Take 1 tablet by mouth nightly 1/31/20  Yes Ml Irons, DO   butalbital-acetaminophen-caffeine (FIORICET, ESGIC) -40 MG per tablet Take 1 tablet by mouth every 4 hours as needed for Headaches 1/31/20  Yes Ml Irons, DO   clopidogrel (PLAVIX) 75 MG tablet Take 1 tablet by mouth daily 2/1/20  Yes Ml Irons, DO   ondansetron (ZOFRAN ODT) 4 MG disintegrating tablet Take 1 tablet by mouth every 8 hours as needed for Nausea 9/9/19  Yes Kathy Pascual PA-C   docusate sodium (COLACE, DULCOLAX) 100 MG CAPS Take 100 mg by mouth 2 times daily 8/22/19  Yes Yessenia Avila MD   hydrOXYzine (ATARAX) 25 MG tablet Take 1 tablet by mouth every 8 hours as needed for Itching 8/5/19  Yes Brooklyn Mccray PA-C   benzonatate (TESSALON PERLES) 100 MG capsule Take 1 capsule by mouth 3 times daily as needed for Cough 3/11/19  Yes Claudio Lopez DO   carvedilol (COREG) 6.25 MG tablet Take 1 tablet by mouth 2 times daily (with meals) 2/7/19  Yes Aide Duque MD   ferrous sulfate (MELISSA-HAO) 325 (65 Fe) MG tablet Take 1 tablet by mouth daily 2/7/19  Yes Aide Duque MD   melatonin 1 MG tablet Take 1 tablet by mouth nightly as needed for Sleep 1/11/19  Yes Edris Apley, MD   ipratropium-albuterol (DUONEB) 0.5-2.5 (3) MG/3ML SOLN nebulizer solution Inhale 3 mLs into the lungs every 4 hours as needed for Shortness of Breath 1/11/19  Yes Edris Apley, MD   mometasone-formoterol [facility]) 100-5 MCG/ACT inhaler Inhale 2 puffs into the lungs 2 times daily 12/5/18  Yes Roni Lorenzo MD   pantoprazole (PROTONIX) 40 MG tablet Take 1 tablet by mouth 2 times daily (before meals) 11/13/18  Yes Lyssa Felix MD   folic acid (FOLVITE) 1 MG tablet Take 1 tablet by mouth daily 10/13/18  Yes Macario Garcia MD   vitamin B-1 100 MG tablet Take 1 tablet by mouth daily 10/13/18  Yes Macario Garcia MD   vitamin B-12 250 MCG tablet Take 1 tablet by mouth daily 10/13/18  Yes Macario Garcia MD   gabapentin (NEURONTIN) 300 MG capsule Take 300 mg by mouth 3 times daily. .   Yes Historical Provider, MD   Multiple Vitamins-Minerals (THERAPEUTIC MULTIVITAMIN-MINERALS) tablet Take 1 tablet by mouth daily 8/12/18  Yes Juliann Waddell MD   albuterol sulfate HFA (PROVENTIL HFA) 108 (90 Base) MCG/ACT inhaler Inhale 1-2 puffs into the lungs once for 1 dose 1/9/20 6/15/20  James Duvall MD   Misc. Devices [facility]) MISC 1 Device by Does not apply route once for 1 dose 8/5/19 8/5/19  Manjula Gregg PA-C       REVIEW OF SYSTEMS    (2-9 systems for level 4, 10 or more for level 5)      Review of Systems   Constitutional: Negative. HENT: Negative. Eyes: Negative. Respiratory: Positive for cough and shortness of breath. Cardiovascular: Negative for chest pain. Gastrointestinal: Negative for abdominal pain. for 10 days       Jerry Carlisle DO  Emergency Medicine Resident    (Please note that portions of thisnote were completed with a voice recognition program.  Efforts were made to edit the dictations but occasionally words are mis-transcribed.)        Jerry Carlisle DO  Resident  07/24/20 0800

## 2020-07-24 NOTE — ED PROVIDER NOTES
[address] [facility]     Emergency Department     Faculty Attestation    I performed a history and physical examination of the patient and discussed management with the resident. I reviewed the residents note and agree with the documented findings and plan of care. Any areas of disagreement are noted on the chart. I was personally present for the key portions of any procedures. I have documented in the chart those procedures where I was not present during the key portions. I have reviewed the emergency nurses triage note. I agree with the chief complaint, past medical history, past surgical history, allergies, medications, social and family history as documented unless otherwise noted below. For Physician Assistant/ Nurse Practitioner cases/documentation I have personally evaluated this patient and have completed at least one if not all key elements of the E/M (history, physical exam, and MDM). Additional findings are as noted. I have personally seen and evaluated the patient. I find the patient's history and physical exam are consistent with the NP/PA documentation. I agree with the care provided, treatment rendered, disposition and follow-up plan. Patient is here complaining primarily of left hip pain which is chronic and recurrent without new trauma. In addition the patient is noted to have rhonchorous respirations and bilateral rhonchi on exam vitals are stable satting 100% will evaluate for potential pneumonia or other respiratory complaints      Critical Care     Jenny Campos M.D.   Attending Emergency  Physician              Georgi Amador MD  07/24/20 [id number]

## 2020-07-26 ENCOUNTER — HOSPITAL ENCOUNTER (EMERGENCY)
Age: 65
Discharge: HOME | End: 2020-07-26
Attending: EMERGENCY MEDICINE
Payer: MEDICARE

## 2020-07-26 VITALS
TEMPERATURE: 97.9 F | SYSTOLIC BLOOD PRESSURE: 145 MMHG | HEIGHT: 59 IN | DIASTOLIC BLOOD PRESSURE: 91 MMHG | HEART RATE: 88 BPM | RESPIRATION RATE: 14 BRPM | BODY MASS INDEX: 20.56 KG/M2 | OXYGEN SATURATION: 99 % | WEIGHT: 102 LBS

## 2020-07-26 PROCEDURE — 94640 AIRWAY INHALATION TREATMENT: CPT

## 2020-07-26 PROCEDURE — 6370000000 HC RX 637 (ALT 250 FOR IP): Performed by: STUDENT IN AN ORGANIZED HEALTH CARE EDUCATION/TRAINING PROGRAM

## 2020-07-26 PROCEDURE — 94664 DEMO&/EVAL PT USE INHALER: CPT

## 2020-07-26 PROCEDURE — 99284 EMERGENCY DEPT VISIT MOD MDM: CPT

## 2020-07-26 RX ORDER — BUTALBITAL, ACETAMINOPHEN AND CAFFEINE 50; 325; 40 MG/1; MG/1; MG/1
1 TABLET ORAL ONCE
Status: COMPLETED | OUTPATIENT
Start: 2020-07-26 | End: 2020-07-26

## 2020-07-26 RX ORDER — DOXYCYCLINE HYCLATE 100 MG
100 TABLET ORAL ONCE
Status: COMPLETED | OUTPATIENT
Start: 2020-07-26 | End: 2020-07-26

## 2020-07-26 RX ORDER — ACETAMINOPHEN 500 MG
1000 TABLET ORAL ONCE
Status: COMPLETED | OUTPATIENT
Start: 2020-07-26 | End: 2020-07-26

## 2020-07-26 RX ORDER — ALBUTEROL SULFATE 90 UG/1
2 AEROSOL, METERED RESPIRATORY (INHALATION) PRN
Status: DISCONTINUED | OUTPATIENT
Start: 2020-07-26 | End: 2020-07-26 | Stop reason: HOSPADM

## 2020-07-26 RX ADMIN — ALBUTEROL SULFATE 2 PUFF: 90 AEROSOL, METERED RESPIRATORY (INHALATION) at 10:26

## 2020-07-26 RX ADMIN — DOXYCYCLINE HYCLATE 100 MG: 100 TABLET, COATED ORAL at 10:46

## 2020-07-26 RX ADMIN — ACETAMINOPHEN 1000 MG: 500 TABLET ORAL at 10:13

## 2020-07-26 RX ADMIN — BUTALBITAL, ACETAMINOPHEN, AND CAFFEINE 1 TABLET: 50; 325; 40 TABLET ORAL at 11:00

## 2020-07-26 ASSESSMENT — PAIN DESCRIPTION - LOCATION: LOCATION: HEAD

## 2020-07-26 ASSESSMENT — PAIN SCALES - GENERAL
PAINLEVEL_OUTOF10: 9
PAINLEVEL_OUTOF10: 10
PAINLEVEL_OUTOF10: 9

## 2020-07-26 ASSESSMENT — PAIN DESCRIPTION - PAIN TYPE: TYPE: ACUTE PAIN

## 2020-07-26 ASSESSMENT — ENCOUNTER SYMPTOMS
NAUSEA: 0
VOMITING: 0
COUGH: 1
RHINORRHEA: 0
SHORTNESS OF BREATH: 0
ABDOMINAL PAIN: 0

## 2020-07-26 NOTE — ED PROVIDER NOTES
for the headache. Does have a history of COPD is not on home O2. States she was recently diagnosed with pneumonia but was unable to get the antibiotics because she could not get to the store. Well-appearing on exam no respiratory distress.   Normal neuro exam.  Will treat headache, breathing treatment, plan discharge      Critical Care     ivonne Patino MD, [place]  Attending Emergency  Physician             Davi Patino MD  07/26/20 [id number]

## 2020-07-26 NOTE — ED PROVIDER NOTES
Hepatitis C, chronic ([facility] History of smoking at least 1 pack per day for at least 30 years, Hyperlipidemia, Hypertension, Migraine, Moderate COPD (chronic obstructive pulmonary disease) ([facility] Multiple transfusions, Neurogenic dysphagia, Osteoarthritis, Pneumonia, Scoliosis, and Substance abuse ([facility] \   has a past surgical history that includes  section; LEEP (); Upper gastrointestinal endoscopy (); Gastric bypass surgery; Appendectomy (2017); laparoscopic appendectomy (N/A, 3/27/2017); Upper gastrointestinal endoscopy (2018); Upper gastrointestinal endoscopy (N/A, 2018); pr colonoscopy w/biopsy single/multiple (N/A, 11/10/2018); pr egd transoral biopsy single/multiple (N/A, 2018); hip surgery (Right, 2019); and Total hip arthroplasty (Right, 2019).     Social History     Socioeconomic History    Marital status: Single     Spouse name: Not on file    Number of children: Not on file    Years of education: Not on file    Highest education level: Not on file   Occupational History    Not on file   Social Needs    Financial resource strain: Not on file    Food insecurity     Worry: Not on file     Inability: Not on file    Transportation needs     Medical: Not on file     Non-medical: Not on file   Tobacco Use    Smoking status: Former Smoker     Packs/day: 1.00     Years: 40.00     Pack years: 40.00     Types: Cigarettes     Start date:      Last attempt to quit: 2018     Years since quittin.7    Smokeless tobacco: Never Used   Substance and Sexual Activity    Alcohol use: No     Alcohol/week: 0.0 standard drinks    Drug use: No     Types: IV, Cocaine     Comment: past history    Sexual activity: Not on file   Lifestyle    Physical activity     Days per week: Not on file     Minutes per session: Not on file    Stress: Not on file   Relationships    Social connections     Talks on phone: Not on file     Gets together: Not on file     Attends Zoroastrianism service: Not on file     Active member of club or organization: Not on file     Attends meetings of clubs or organizations: Not on file     Relationship status: Not on file    Intimate partner violence     Fear of current or ex partner: Not on file     Emotionally abused: Not on file     Physically abused: Not on file     Forced sexual activity: Not on file   Other Topics Concern    Not on file   Social History Narrative    Not on file       Family History   Problem Relation Age of Onset    Breast Cancer Mother     Heart Disease Father        Allergies:    Aspirin and Lorrene Demetrio [cephalexin]    Home Medications:  Prior to Admission medications    Medication Sig Start Date End Date Taking?  Authorizing Provider   doxycycline monohydrate (ADOXA) 100 MG tablet Take 1 tablet by mouth 2 times daily for 10 days 7/24/20 8/3/20  Yanci Conley,    docusate sodium (COLACE) 100 MG capsule Take 1 capsule by mouth 2 times daily for 14 days 7/16/20 7/30/20  Neyda Barrera,    ibuprofen (ADVIL;MOTRIN) 600 MG tablet Take 1 tablet by mouth 4 times daily as needed for Pain 7/16/20 7/30/20  Neyda Barrera, DO   senna (SENOKOT) 8.6 MG TABS tablet Take 1 tablet by mouth daily for 14 days 7/16/20 7/30/20  Neyda Barrera, DO   diclofenac (VOLTAREN) 50 MG EC tablet Take 1 tablet by mouth 2 times daily 4/6/20   Pola Morales,    amitriptyline (ELAVIL) 10 MG tablet Take 1 tablet by mouth nightly 1/31/20   Annel Andino DO   atorvastatin (LIPITOR) 40 MG tablet Take 1 tablet by mouth nightly 1/31/20   Rafael Caraballo DO   butalbital-acetaminophen-caffeine (FIORICET, ESGIC) -40 MG per tablet Take 1 tablet by mouth every 4 hours as needed for Headaches 1/31/20   Rafael Caraballo DO   clopidogrel (PLAVIX) 75 MG tablet Take 1 tablet by mouth daily 2/1/20   Annel Andino,    albuterol sulfate HFA (PROVENTIL HFA) 108 (90 Base) MCG/ACT inhaler Inhale 1-2 puffs into the lungs once for 1 dose 1/9/20 6/15/20  Abbey Gilbert MD   ondansetron (ZOFRAN ODT) 4 MG disintegrating tablet Take 1 tablet by mouth every 8 hours as needed for Nausea 9/9/19   Patrick Gusman PA-C   docusate sodium (COLACE, DULCOLAX) 100 MG CAPS Take 100 mg by mouth 2 times daily 8/22/19   Melania Urbina MD   [facility]) MISC 1 Device by Does not apply route once for 1 dose 8/5/19 8/5/19  Patrick Gusman PA-C   hydrOXYzine (ATARAX) 25 MG tablet Take 1 tablet by mouth every 8 hours as needed for Itching 8/5/19   Patrick Gusman PA-C   benzonatate (TESSALON PERLES) 100 MG capsule Take 1 capsule by mouth 3 times daily as needed for Cough 3/11/19   Johanna Lopez DO   carvedilol (COREG) 6.25 MG tablet Take 1 tablet by mouth 2 times daily (with meals) 2/7/19   Emiliana Chacko MD   ferrous sulfate (MELISSA-HAO) 325 (65 Fe) MG tablet Take 1 tablet by mouth daily 2/7/19   Emiliana Chacko MD   melatonin 1 MG tablet Take 1 tablet by mouth nightly as needed for Sleep 1/11/19   Lakia Han MD   ipratropium-albuterol (DUONEB) 0.5-2.5 (3) MG/3ML SOLN nebulizer solution Inhale 3 mLs into the lungs every 4 hours as needed for Shortness of Breath 1/11/19   Lakia Han MD   mometasone-formoterol [facility]) 100-5 MCG/ACT inhaler Inhale 2 puffs into the lungs 2 times daily 12/5/18   Garfield Buenrostro MD   pantoprazole (PROTONIX) 40 MG tablet Take 1 tablet by mouth 2 times daily (before meals) 11/13/18   Horace Dang MD   folic acid (FOLVITE) 1 MG tablet Take 1 tablet by mouth daily 10/13/18   Kishore Fernando MD   vitamin B-1 100 MG tablet Take 1 tablet by mouth daily 10/13/18   Kishore Fernando MD   vitamin B-12 250 MCG tablet Take 1 tablet by mouth daily 10/13/18   Kishore Fernando MD   gabapentin (NEURONTIN) 300 MG capsule Take 300 mg by mouth 3 times daily. [place]     Historical Provider, MD   Multiple Vitamins-Minerals (THERAPEUTIC MULTIVITAMIN-MINERALS) tablet Take 1 tablet by mouth daily 8/12/18   Radha Hill MD       REVIEW OF SYSTEMS Capillary refill takes less than 2 seconds. Neurological:      General: No focal deficit present. Mental Status: She is alert and oriented to person, place, and time. DIFFERENTIAL  DIAGNOSIS   PLAN (LABS / IMAGING / EKG):  No orders of the defined types were placed in this encounter. MEDICATIONS ORDERED:  Orders Placed This Encounter   Medications    acetaminophen (TYLENOL) tablet 1,000 mg    albuterol sulfate  (90 Base) MCG/ACT inhaler 2 puff    butalbital-acetaminophen-caffeine (FIORICET, ESGIC) per tablet 1 tablet    doxycycline hyclate (VIBRA-TABS) tablet 100 mg         DIAGNOSTIC RESULTS / EMERGENCYDEPARTMENT COURSE / MDM   LABS:  Labs Reviewed - No data to display    RADIOLOGY:  No results found. Impression:  Patient presents with headache, cough. Patient states that she was seen and evaluated here recently. States that she is a history of headaches and the current headache feels like when she is had previously. Also complaining of some left hip pain. Patient diagnosed with avascular necrosis of the left hip recently. Has not followed up with orthopedics yet. EMERGENCY DEPARTMENT COURSE:   Patient given Tylenol, Fioricet, doxycycline. Soon after the Fioricet, patient stated that she was feeling much better while she was sitting in her wheelchair fully dressed with the door. Also stated that her left hip feels better. I asked patient if she wanted to go home at this time and she said she did. I asked patient if she had the ability to get the doxycycline filled today and she said that she could take the bus to go get it filled. I also asked patient if she had the ability to get to her upcoming orthopedic appointment on Thursday patient states that she could call her nephew. Patient was given instructions regarding her upcoming orthopedic appointment on 7/30, and instructed the patient that she needed to go get her prescriptions filled for doxycycline. Patient voiced understanding of these instructions    MDM  Number of Diagnoses or Management Options  Acute nonintractable headache, unspecified headache type:   Community acquired pneumonia of left lower lobe of lung ([facility] [address]):      Amount and/or Complexity of Data Reviewed  Clinical lab tests: reviewed  Tests in the radiology section of CPT®: reviewed  Review and summarize past medical records: yes  Discuss the patient with other providers: yes    Risk of Complications, Morbidity, and/or Mortality  Presenting problems: low  Diagnostic procedures: minimal  Management options: low    Patient Progress  Patient progress: improved      PROCEDURES:  none    CONSULTS:  None    CRITICAL CARE:  Please see attending note    FINAL IMPRESSION     1. Acute nonintractable headache, unspecified headache type    2. Community acquired pneumonia of left lower lobe of lung ([facility]          DISPOSITION / PLAN   DISPOSITION Decision To Discharge 07/26/2020 11:17:30 AM      Evaluation and treatment course in the ED, and plan of care upon discharge was discussed in length with the patient. Patient had no further questions prior to being discharged and was instructed to return to the ED for new or worsening symptoms. Any changes to existing medications or new prescriptions were reviewed with patient and they expressed understanding of how to correctly take their medications and the possible side effects.     PATIENT REFERRED TO:  [facility] AT [address] [address]  [address] [place] [address]  [phone]          DISCHARGE MEDICATIONS:  New Prescriptions    No medications on file       Jovan Marroquin DO  Emergency Medicine Resident Physician, PGY-2    (Please note that portions of this note were completed with a voice recognition program.  Efforts were made to edit the dictations but occasionally words are mis-transcribed.)          Teresa Kelly MD  07/26/20 9079

## 2020-07-27 ENCOUNTER — HOSPITAL ENCOUNTER (EMERGENCY)
Age: 65
Discharge: HOME | End: 2020-07-27
Attending: EMERGENCY MEDICINE
Payer: MEDICARE

## 2020-07-27 ENCOUNTER — APPOINTMENT (OUTPATIENT)
Dept: GENERAL RADIOLOGY | Age: 65
End: 2020-07-27
Payer: MEDICARE

## 2020-07-27 ENCOUNTER — APPOINTMENT (OUTPATIENT)
Dept: CT IMAGING | Age: 65
End: 2020-07-27
Payer: MEDICARE

## 2020-07-27 ENCOUNTER — CARE COORDINATION (OUTPATIENT)
Dept: FAMILY MEDICINE CLINIC | Age: 65
End: 2020-07-27

## 2020-07-27 VITALS
OXYGEN SATURATION: 95 % | DIASTOLIC BLOOD PRESSURE: 90 MMHG | RESPIRATION RATE: 18 BRPM | SYSTOLIC BLOOD PRESSURE: 146 MMHG | TEMPERATURE: 98.1 F | HEART RATE: 94 BPM

## 2020-07-27 LAB
EKG ATRIAL RATE: 90 BPM
EKG P AXIS: 53 DEGREES
EKG P-R INTERVAL: 146 MS
EKG Q-T INTERVAL: 348 MS
EKG QRS DURATION: 70 MS
EKG QTC CALCULATION (BAZETT): 425 MS
EKG R AXIS: 43 DEGREES
EKG T AXIS: -70 DEGREES
EKG VENTRICULAR RATE: 90 BPM

## 2020-07-27 PROCEDURE — 6370000000 HC RX 637 (ALT 250 FOR IP): Performed by: STUDENT IN AN ORGANIZED HEALTH CARE EDUCATION/TRAINING PROGRAM

## 2020-07-27 PROCEDURE — 96375 TX/PRO/DX INJ NEW DRUG ADDON: CPT

## 2020-07-27 PROCEDURE — 99283 EMERGENCY DEPT VISIT LOW MDM: CPT

## 2020-07-27 PROCEDURE — 6360000002 HC RX W HCPCS: Performed by: STUDENT IN AN ORGANIZED HEALTH CARE EDUCATION/TRAINING PROGRAM

## 2020-07-27 PROCEDURE — 94640 AIRWAY INHALATION TREATMENT: CPT

## 2020-07-27 PROCEDURE — 71045 X-RAY EXAM CHEST 1 VIEW: CPT

## 2020-07-27 PROCEDURE — 93005 ELECTROCARDIOGRAM TRACING: CPT | Performed by: STUDENT IN AN ORGANIZED HEALTH CARE EDUCATION/TRAINING PROGRAM

## 2020-07-27 PROCEDURE — 96374 THER/PROPH/DIAG INJ IV PUSH: CPT

## 2020-07-27 RX ORDER — DEXAMETHASONE SODIUM PHOSPHATE 4 MG/ML
2 INJECTION, SOLUTION INTRA-ARTICULAR; INTRALESIONAL; INTRAMUSCULAR; INTRAVENOUS; SOFT TISSUE ONCE
Status: COMPLETED | OUTPATIENT
Start: 2020-07-27 | End: 2020-07-27

## 2020-07-27 RX ORDER — PROCHLORPERAZINE EDISYLATE 5 MG/ML
10 INJECTION INTRAMUSCULAR; INTRAVENOUS ONCE
Status: COMPLETED | OUTPATIENT
Start: 2020-07-27 | End: 2020-07-27

## 2020-07-27 RX ORDER — ALBUTEROL SULFATE 2.5 MG/3ML
2.5 SOLUTION RESPIRATORY (INHALATION) EVERY 6 HOURS PRN
Status: DISCONTINUED | OUTPATIENT
Start: 2020-07-27 | End: 2020-07-27

## 2020-07-27 RX ORDER — ALBUTEROL SULFATE 90 UG/1
2 AEROSOL, METERED RESPIRATORY (INHALATION) EVERY 6 HOURS PRN
Status: DISCONTINUED | OUTPATIENT
Start: 2020-07-27 | End: 2020-07-27 | Stop reason: HOSPADM

## 2020-07-27 RX ORDER — DIPHENHYDRAMINE HYDROCHLORIDE 50 MG/ML
25 INJECTION INTRAMUSCULAR; INTRAVENOUS ONCE
Status: COMPLETED | OUTPATIENT
Start: 2020-07-27 | End: 2020-07-27

## 2020-07-27 RX ADMIN — DIPHENHYDRAMINE HYDROCHLORIDE 25 MG: 50 INJECTION, SOLUTION INTRAMUSCULAR; INTRAVENOUS at 09:49

## 2020-07-27 RX ADMIN — DEXAMETHASONE SODIUM PHOSPHATE 2 MG: 4 INJECTION, SOLUTION INTRAMUSCULAR; INTRAVENOUS at 09:49

## 2020-07-27 RX ADMIN — ALBUTEROL SULFATE 2 PUFF: 90 AEROSOL, METERED RESPIRATORY (INHALATION) at 09:53

## 2020-07-27 RX ADMIN — PROCHLORPERAZINE EDISYLATE 10 MG: 5 INJECTION INTRAMUSCULAR; INTRAVENOUS at 09:49

## 2020-07-27 ASSESSMENT — PAIN SCALES - GENERAL: PAINLEVEL_OUTOF10: 9

## 2020-07-27 ASSESSMENT — ENCOUNTER SYMPTOMS
VOMITING: 1
NAUSEA: 1
ABDOMINAL PAIN: 1

## 2020-07-27 NOTE — CARE COORDINATION
ACM attempted outreach for ED follow up, migraine, COVID 19 Protocol    No answer and LVM to return call to [facility] at [phone] for updates with symptoms, and COVID 19 Protocol

## 2020-07-27 NOTE — ED NOTES
Patient in need of a ride home from the ED. SW set up her [employer] Cab through [employer] and [employer]. Ursula Perez.  Megan Saba  07/27/20 105

## 2020-07-27 NOTE — ED NOTES
Pt. To ER room 6 from triage via wheelchair, pt reports having a migraine and being out of medications for this, states she was here yesterday. Pt. Noted to have 4 wristbands on. Pt. Rates her migraine is a 9/10 on pain scale, denies all other medical concerns at this time. Pt. A/Ox4, RR even and unlabored, NAD. Vitals stable. Awaiting further orders.  Will continue to monitor and reassess     Paulette Lassiter RN  07/27/20 3069

## 2020-07-27 NOTE — ED PROVIDER NOTES
[facility] ED  EMERGENCY DEPARTMENT ENCOUNTER      Pt Name: Driss Wetzel  MRN: [id number]  [place] 1955  Date of evaluation: 7/27/2020  Provider: Monica Quiroz MD    CHIEF COMPLAINT       Chief Complaint   Patient presents with    Migraine         HISTORY OF PRESENT ILLNESS   (Location/Symptom, Timing/Onset, Context/Setting, Quality, Duration, Modifying Factors, Severity)  Note limiting factors. Driss Wetzel is a 77 y.o. female who presents to the emergency department with severe generalized headache that started a few days ago (9/10 in severity) exacerbated by loud noises, voices and by bright lights. 1 episode of vomiting yesterday. No fever. Bilateral flank pain reported. Nausea reported. HPI    Nursing Notes were reviewed. REVIEW OF SYSTEMS    (2-9 systems for level 4, 10 or more for level 5)     Review of Systems   Constitutional: Negative for chills, diaphoresis and fever. Cardiovascular: Negative. Gastrointestinal: Positive for abdominal pain, nausea and vomiting. Genitourinary: Negative. Neurological: Positive for headaches. Negative for seizures, syncope, weakness and numbness. Except as noted above the remainder of the review of systems was reviewed and negative.        PAST MEDICAL HISTORY     Past Medical History:   Diagnosis Date    Appendicitis 3/27/2017    Cerebral artery occlusion with cerebral infarction ([address]     CHF (congestive heart failure) (HCC)     Chronic respiratory failure with hypoxia and hypercapnia (HCC)     Chronic rhinitis     Cigarette smoker     COPD (chronic obstructive pulmonary disease) ([address] 8/5/2018    Depression     Dysphagia     Essential hypertension 8/11/2018    GERD (gastroesophageal reflux disease)     Heart failure, diastolic, with acute decompensation ([address] 9/5/2018    Hepatitis C, chronic (HCC)     History of smoking at least 1 pack per day for at least 30 years     Hyperlipidemia     Hypertension     Migraine     Moderate COPD (chronic obstructive pulmonary disease) (HCC)     Multiple transfusions     Neurogenic dysphagia     Osteoarthritis     hands    Pneumonia     Scoliosis     Substance abuse ([facility]     ivda and cocaine abuse in past         SURGICAL HISTORY       Past Surgical History:   Procedure Laterality Date    APPENDECTOMY  2017     SECTION      GASTRIC BYPASS SURGERY      HIP SURGERY Right 2019    HIP TOTAL ARTHROPLASTY ANTERIOR APPROACH - [facility],  C-ARM, (Right )    LAPAROSCOPIC APPENDECTOMY N/A 3/27/2017    APPENDECTOMY LAPAROSCOPIC performed by Lyric Pinzon MD at [address] [facility]      hgsil    VT COLONOSCOPY W/BIOPSY SINGLE/MULTIPLE N/A 11/10/2018    COLONOSCOPY WITH BIOPSY performed by Mariana Reynolds MD at [facility]    VT EGD TRANSORAL BIOPSY SINGLE/MULTIPLE N/A 2018    EGD ESOPHAGOGASTRODUODENOSCOPY performed by Katerine Barrett MD at [facility] Right 2019    [facility],  C-ARM, performed by Reynold Poole DO at [facility]      oesophagitis, candidiasis    UPPER GASTROINTESTINAL ENDOSCOPY  2018    UPPER GASTROINTESTINAL ENDOSCOPY N/A 2018    EGD CONTROL HEMORRHAGE performed by Katerine Barrett MD at [facility]       Previous Medications    ALBUTEROL SULFATE HFA (PROVENTIL HFA) 108 (90 BASE) MCG/ACT INHALER    Inhale 1-2 puffs into the lungs once for 1 dose    AMITRIPTYLINE (ELAVIL) 10 MG TABLET    Take 1 tablet by mouth nightly    ATORVASTATIN (LIPITOR) 40 MG TABLET    Take 1 tablet by mouth nightly    BENZONATATE (TESSALON PERLES) 100 MG CAPSULE    Take 1 capsule by mouth 3 times daily as needed for Cough    BUTALBITAL-ACETAMINOPHEN-CAFFEINE (FIORICET, ESGIC) -40 MG PER TABLET    Take 1 tablet by mouth every 4 hours as needed for Headaches    CARVEDILOL (COREG) 6.25 MG TABLET    Take 1 tablet by mouth 2 times daily (with meals)    CLOPIDOGREL (PLAVIX) 75 MG TABLET    Take 1 tablet by mouth daily    DICLOFENAC (VOLTAREN) 50 MG EC TABLET    Take 1 tablet by mouth 2 times daily    DOCUSATE SODIUM (COLACE) 100 MG CAPSULE    Take 1 capsule by mouth 2 times daily for 14 days    DOCUSATE SODIUM (COLACE, DULCOLAX) 100 MG CAPS    Take 100 mg by mouth 2 times daily    DOXYCYCLINE MONOHYDRATE (ADOXA) 100 MG TABLET    Take 1 tablet by mouth 2 times daily for 10 days    FERROUS SULFATE (MELISSA-HAO) 325 (65 FE) MG TABLET    Take 1 tablet by mouth daily    FOLIC ACID (FOLVITE) 1 MG TABLET    Take 1 tablet by mouth daily    GABAPENTIN (NEURONTIN) 300 MG CAPSULE    Take 300 mg by mouth 3 times daily. Tommas Baptise HYDROXYZINE (ATARAX) 25 MG TABLET    Take 1 tablet by mouth every 8 hours as needed for Itching    IBUPROFEN (ADVIL;MOTRIN) 600 MG TABLET    Take 1 tablet by mouth 4 times daily as needed for Pain    IPRATROPIUM-ALBUTEROL (DUONEB) 0.5-2.5 (3) MG/3ML SOLN NEBULIZER SOLUTION    Inhale 3 mLs into the lungs every 4 hours as needed for Shortness of Breath    MELATONIN 1 MG TABLET    Take 1 tablet by mouth nightly as needed for Sleep    MISC.  DEVICES (WHEELCHAIR) MISC    1 Device by Does not apply route once for 1 dose    MOMETASONE-FORMOTEROL (DULERA) 100-5 MCG/ACT INHALER    Inhale 2 puffs into the lungs 2 times daily    MULTIPLE VITAMINS-MINERALS (THERAPEUTIC MULTIVITAMIN-MINERALS) TABLET    Take 1 tablet by mouth daily    ONDANSETRON (ZOFRAN ODT) 4 MG DISINTEGRATING TABLET    Take 1 tablet by mouth every 8 hours as needed for Nausea    PANTOPRAZOLE (PROTONIX) 40 MG TABLET    Take 1 tablet by mouth 2 times daily (before meals)    SENNA (SENOKOT) 8.6 MG TABS TABLET    Take 1 tablet by mouth daily for 14 days    VITAMIN B-1 100 MG TABLET    Take 1 tablet by mouth daily    VITAMIN B-12 250 MCG TABLET    Take 1 tablet by mouth daily       ALLERGIES     Aspirin and Keflet [cephalexin]    FAMILY HISTORY       Family History Problem Relation Age of Onset    Breast Cancer Mother     Heart Disease Father           SOCIAL HISTORY       Social History     Socioeconomic History    Marital status: Single     Spouse name: None    Number of children: None    Years of education: None    Highest education level: None   Occupational History    None   Social Needs    Financial resource strain: None    Food insecurity     Worry: None     Inability: None    Transportation needs     Medical: None     Non-medical: None   Tobacco Use    Smoking status: Former Smoker     Packs/day: 1.00     Years: 40.00     Pack years: 40.00     Types: Cigarettes     Start date:      Last attempt to quit: 2018     Years since quittin.7    Smokeless tobacco: Never Used   Substance and Sexual Activity    Alcohol use: No     Alcohol/week: 0.0 standard drinks    Drug use: No     Types: IV, Cocaine     Comment: past history    Sexual activity: None   Lifestyle    Physical activity     Days per week: None     Minutes per session: None    Stress: None   Relationships    Social connections     Talks on phone: None     Gets together: None     Attends Bahai service: None     Active member of club or organization: None     Attends meetings of clubs or organizations: None     Relationship status: None    Intimate partner violence     Fear of current or ex partner: None     Emotionally abused: None     Physically abused: None     Forced sexual activity: None   Other Topics Concern    None   Social History Narrative    None       SCREENINGS        Roxann Coma Scale  Eye Opening: Spontaneous  Best Verbal Response: Oriented  Best Motor Response: Obeys commands  Roxann Coma Scale Score: 15               PHYSICAL EXAM    (up to 7 for level 4, 8 or more for level 5)     ED Triage Vitals [20 0846]   BP Temp Temp Source Pulse Resp SpO2 Height Weight   (!) 146/90 98.1 °F (36.7 °C) Oral 94 18 95 % -- --       Physical Exam  Pulmonary:      Effort: Tachypnea present. No accessory muscle usage, respiratory distress or retractions. Breath sounds: Wheezing present. Abdominal:      General: There is distension. Neurological:      Mental Status: She is alert. DIAGNOSTIC RESULTS     EKG: All EKG's are interpreted by the Emergency Department Physician who either signs or Co-signs this chart in the absence of a cardiologist.    RADIOLOGY:   Non-plain film images such as CT, Ultrasound and MRI are read by the radiologist. Plain radiographic images are visualized and preliminarily interpreted by the emergency physician with the below findings:      Interpretation per the Radiologist below, if available at the time of this note:    [address]    (Results Pending)   XR CHEST PORTABLE    (Results Pending)         ED BEDSIDE ULTRASOUND:   Performed by ED Physician - none    LABS: No labs drawn. EMERGENCY DEPARTMENT COURSE and DIFFERENTIAL DIAGNOSIS/MDM:   Vitals:    Vitals:    07/27/20 0846   BP: (!) 146/90   Pulse: 94   Resp: 18   Temp: 98.1 °F (36.7 °C)   TempSrc: Oral   SpO2: 95%         MDM  77years old female patient with severe headache. Vomiting yesterday and nausea. Bilateral flank pain reported. Decision to give migraine cocktail and reassess patient. Brain Ct scan without contrast, EKG and CXR portable to be ordered. REASSESSMENT     ED Course as of Jul 27 1117   Mon Jul 27, 2020   1047 XR CHEST PORTABLE [MK]   1049 XR CHEST PORTABLE [MK]   7397 Patient has left her room. No proper re-evaluation was made. I caught the patient just before she went into the cab. Patient seemed to be stable with no headache and feeling a lot better. CT scan of the brain was not done.       [MK]      ED Course User Index  [MK] Otis Diaz MD         CRITICAL CARE TIME     CONSULTS:  None    PROCEDURES:  Unless otherwise noted below, none     EKG 12 Lead    Date/Time: 7/27/2020 10:58 AM  Performed by: Otis Diaz MD  Authorized by: Yaritza Kaiser

## 2020-07-28 ENCOUNTER — CARE COORDINATION (OUTPATIENT)
Dept: FAMILY MEDICINE CLINIC | Age: 65
End: 2020-07-28

## 2020-07-28 NOTE — CARE COORDINATION
M attempted 2nd outreach for ED follow up, migraine, COVID 19 Protocol    No answer and LVM to return call to [facility] at [phone]    No further outreaches if no return call

## 2020-07-30 ENCOUNTER — APPOINTMENT (OUTPATIENT)
Dept: CT IMAGING | Age: 65
DRG: 134 | End: 2020-07-30
Payer: MEDICARE

## 2020-07-30 ENCOUNTER — HOSPITAL ENCOUNTER (INPATIENT)
Age: 65
LOS: 3 days | Discharge: HOME | DRG: 134 | End: 2020-08-02
Attending: EMERGENCY MEDICINE | Admitting: INTERNAL MEDICINE
Payer: MEDICARE

## 2020-07-30 ENCOUNTER — APPOINTMENT (OUTPATIENT)
Dept: GENERAL RADIOLOGY | Age: 65
DRG: 134 | End: 2020-07-30
Payer: MEDICARE

## 2020-07-30 PROBLEM — I26.99 PULMONARY EMBOLISM ON RIGHT (HCC): Status: ACTIVE | Noted: 2020-07-30

## 2020-07-30 LAB
ABSOLUTE EOS #: 0.13 K/UL (ref 0–0.4)
ABSOLUTE IMMATURE GRANULOCYTE: 0.63 K/UL (ref 0–0.3)
ABSOLUTE LYMPH #: 2.13 K/UL (ref 1–4.8)
ABSOLUTE MONO #: 0.88 K/UL (ref 0.1–0.8)
ALBUMIN SERPL-MCNC: 3.5 G/DL (ref 3.5–5.2)
ALBUMIN/GLOBULIN RATIO: 0.7 (ref 1–2.5)
ALP BLD-CCNC: 71 U/L (ref 35–104)
ALT SERPL-CCNC: 8 U/L (ref 5–33)
ANION GAP SERPL CALCULATED.3IONS-SCNC: 18 MMOL/L (ref 9–17)
AST SERPL-CCNC: 19 U/L
BASOPHILS # BLD: 0 % (ref 0–2)
BASOPHILS ABSOLUTE: 0 K/UL (ref 0–0.2)
BILIRUB SERPL-MCNC: 0.38 MG/DL (ref 0.3–1.2)
BNP INTERPRETATION: ABNORMAL
BUN BLDV-MCNC: 26 MG/DL (ref 8–23)
BUN/CREAT BLD: ABNORMAL (ref 9–20)
CALCIUM SERPL-MCNC: 9.9 MG/DL (ref 8.6–10.4)
CHLORIDE BLD-SCNC: 106 MMOL/L (ref 98–107)
CO2: 16 MMOL/L (ref 20–31)
CREAT SERPL-MCNC: 1.37 MG/DL (ref 0.5–0.9)
DIFFERENTIAL TYPE: ABNORMAL
EOSINOPHILS RELATIVE PERCENT: 1 % (ref 1–4)
GFR AFRICAN AMERICAN: 47 ML/MIN
GFR NON-AFRICAN AMERICAN: 38 ML/MIN
GFR SERPL CREATININE-BSD FRML MDRD: ABNORMAL ML/MIN/{1.73_M2}
GFR SERPL CREATININE-BSD FRML MDRD: ABNORMAL ML/MIN/{1.73_M2}
GLUCOSE BLD-MCNC: 94 MG/DL (ref 70–99)
HCT VFR BLD CALC: 42.9 % (ref 36.3–47.1)
HEMOGLOBIN: 13 G/DL (ref 11.9–15.1)
IMMATURE GRANULOCYTES: 5 %
INR BLD: 1
LACTIC ACID, WHOLE BLOOD: 1.1 MMOL/L (ref 0.7–2.1)
LYMPHOCYTES # BLD: 17 % (ref 24–44)
MCH RBC QN AUTO: 30.5 PG (ref 25.2–33.5)
MCHC RBC AUTO-ENTMCNC: 30.3 G/DL (ref 28.4–34.8)
MCV RBC AUTO: 100.7 FL (ref 82.6–102.9)
MONOCYTES # BLD: 7 % (ref 1–7)
MORPHOLOGY: ABNORMAL
NRBC AUTOMATED: 0 PER 100 WBC
PARTIAL THROMBOPLASTIN TIME: 23.7 SEC (ref 20.5–30.5)
PDW BLD-RTO: 17 % (ref 11.8–14.4)
PLATELET # BLD: 816 K/UL (ref 138–453)
PLATELET ESTIMATE: ABNORMAL
PMV BLD AUTO: 8.9 FL (ref 8.1–13.5)
POTASSIUM SERPL-SCNC: 4 MMOL/L (ref 3.7–5.3)
PRO-BNP: 1101 PG/ML
PROTHROMBIN TIME: 10.2 SEC (ref 9–12)
RBC # BLD: 4.26 M/UL (ref 3.95–5.11)
RBC # BLD: ABNORMAL 10*6/UL
SEG NEUTROPHILS: 70 % (ref 36–66)
SEGMENTED NEUTROPHILS ABSOLUTE COUNT: 8.73 K/UL (ref 1.8–7.7)
SODIUM BLD-SCNC: 140 MMOL/L (ref 135–144)
TOTAL PROTEIN: 8.4 G/DL (ref 6.4–8.3)
TROPONIN INTERP: ABNORMAL
TROPONIN T: ABNORMAL NG/ML
TROPONIN, HIGH SENSITIVITY: 19 NG/L (ref 0–14)
WBC # BLD: 12.5 K/UL (ref 3.5–11.3)
WBC # BLD: ABNORMAL 10*3/UL

## 2020-07-30 PROCEDURE — 6360000004 HC RX CONTRAST MEDICATION: Performed by: STUDENT IN AN ORGANIZED HEALTH CARE EDUCATION/TRAINING PROGRAM

## 2020-07-30 PROCEDURE — 2060000000 HC ICU INTERMEDIATE R&B

## 2020-07-30 PROCEDURE — 6360000002 HC RX W HCPCS: Performed by: STUDENT IN AN ORGANIZED HEALTH CARE EDUCATION/TRAINING PROGRAM

## 2020-07-30 PROCEDURE — 87040 BLOOD CULTURE FOR BACTERIA: CPT

## 2020-07-30 PROCEDURE — 83605 ASSAY OF LACTIC ACID: CPT

## 2020-07-30 PROCEDURE — 93005 ELECTROCARDIOGRAM TRACING: CPT | Performed by: STUDENT IN AN ORGANIZED HEALTH CARE EDUCATION/TRAINING PROGRAM

## 2020-07-30 PROCEDURE — 2580000003 HC RX 258: Performed by: STUDENT IN AN ORGANIZED HEALTH CARE EDUCATION/TRAINING PROGRAM

## 2020-07-30 PROCEDURE — 85730 THROMBOPLASTIN TIME PARTIAL: CPT

## 2020-07-30 PROCEDURE — 99223 1ST HOSP IP/OBS HIGH 75: CPT | Performed by: INTERNAL MEDICINE

## 2020-07-30 PROCEDURE — 71260 CT THORAX DX C+: CPT

## 2020-07-30 PROCEDURE — 36415 COLL VENOUS BLD VENIPUNCTURE: CPT

## 2020-07-30 PROCEDURE — 76937 US GUIDE VASCULAR ACCESS: CPT

## 2020-07-30 PROCEDURE — 80053 COMPREHEN METABOLIC PANEL: CPT

## 2020-07-30 PROCEDURE — 99285 EMERGENCY DEPT VISIT HI MDM: CPT

## 2020-07-30 PROCEDURE — 96374 THER/PROPH/DIAG INJ IV PUSH: CPT

## 2020-07-30 PROCEDURE — 6370000000 HC RX 637 (ALT 250 FOR IP): Performed by: STUDENT IN AN ORGANIZED HEALTH CARE EDUCATION/TRAINING PROGRAM

## 2020-07-30 PROCEDURE — 71045 X-RAY EXAM CHEST 1 VIEW: CPT

## 2020-07-30 PROCEDURE — 83880 ASSAY OF NATRIURETIC PEPTIDE: CPT

## 2020-07-30 PROCEDURE — 85025 COMPLETE CBC W/AUTO DIFF WBC: CPT

## 2020-07-30 PROCEDURE — 84484 ASSAY OF TROPONIN QUANT: CPT

## 2020-07-30 PROCEDURE — 85610 PROTHROMBIN TIME: CPT

## 2020-07-30 RX ORDER — NICOTINE 21 MG/24HR
1 PATCH, TRANSDERMAL 24 HOURS TRANSDERMAL DAILY
Status: DISCONTINUED | OUTPATIENT
Start: 2020-07-31 | End: 2020-08-02 | Stop reason: HOSPADM

## 2020-07-30 RX ORDER — SODIUM CHLORIDE 0.9 % (FLUSH) 0.9 %
10 SYRINGE (ML) INJECTION EVERY 12 HOURS SCHEDULED
Status: DISCONTINUED | OUTPATIENT
Start: 2020-07-30 | End: 2020-08-02 | Stop reason: HOSPADM

## 2020-07-30 RX ORDER — POTASSIUM CHLORIDE 7.45 MG/ML
10 INJECTION INTRAVENOUS PRN
Status: DISCONTINUED | OUTPATIENT
Start: 2020-07-30 | End: 2020-08-02 | Stop reason: HOSPADM

## 2020-07-30 RX ORDER — PANTOPRAZOLE SODIUM 40 MG/1
40 TABLET, DELAYED RELEASE ORAL
Status: DISCONTINUED | OUTPATIENT
Start: 2020-07-31 | End: 2020-08-01

## 2020-07-30 RX ORDER — PROMETHAZINE HYDROCHLORIDE 25 MG/1
12.5 TABLET ORAL EVERY 6 HOURS PRN
Status: DISCONTINUED | OUTPATIENT
Start: 2020-07-30 | End: 2020-08-02 | Stop reason: HOSPADM

## 2020-07-30 RX ORDER — SODIUM CHLORIDE 0.9 % (FLUSH) 0.9 %
10 SYRINGE (ML) INJECTION PRN
Status: DISCONTINUED | OUTPATIENT
Start: 2020-07-30 | End: 2020-08-02 | Stop reason: HOSPADM

## 2020-07-30 RX ORDER — HEPARIN SODIUM 1000 [USP'U]/ML
80 INJECTION, SOLUTION INTRAVENOUS; SUBCUTANEOUS ONCE
Status: COMPLETED | OUTPATIENT
Start: 2020-07-30 | End: 2020-07-30

## 2020-07-30 RX ORDER — HEPARIN SODIUM 10000 [USP'U]/100ML
18 INJECTION, SOLUTION INTRAVENOUS CONTINUOUS
Status: DISCONTINUED | OUTPATIENT
Start: 2020-07-30 | End: 2020-07-31 | Stop reason: ALTCHOICE

## 2020-07-30 RX ORDER — POTASSIUM CHLORIDE 20 MEQ/1
40 TABLET, EXTENDED RELEASE ORAL PRN
Status: DISCONTINUED | OUTPATIENT
Start: 2020-07-30 | End: 2020-08-02 | Stop reason: HOSPADM

## 2020-07-30 RX ORDER — ACETAMINOPHEN 325 MG/1
650 TABLET ORAL EVERY 4 HOURS PRN
Status: DISCONTINUED | OUTPATIENT
Start: 2020-07-30 | End: 2020-08-02 | Stop reason: HOSPADM

## 2020-07-30 RX ORDER — ALBUTEROL SULFATE 2.5 MG/3ML
2.5 SOLUTION RESPIRATORY (INHALATION)
Status: DISCONTINUED | OUTPATIENT
Start: 2020-07-30 | End: 2020-08-02 | Stop reason: HOSPADM

## 2020-07-30 RX ORDER — HEPARIN SODIUM 1000 [USP'U]/ML
40 INJECTION, SOLUTION INTRAVENOUS; SUBCUTANEOUS PRN
Status: DISCONTINUED | OUTPATIENT
Start: 2020-07-30 | End: 2020-07-31

## 2020-07-30 RX ORDER — ATORVASTATIN CALCIUM 40 MG/1
40 TABLET, FILM COATED ORAL NIGHTLY
Status: DISCONTINUED | OUTPATIENT
Start: 2020-07-30 | End: 2020-08-02 | Stop reason: HOSPADM

## 2020-07-30 RX ORDER — 0.9 % SODIUM CHLORIDE 0.9 %
500 INTRAVENOUS SOLUTION INTRAVENOUS ONCE
Status: COMPLETED | OUTPATIENT
Start: 2020-07-30 | End: 2020-07-30

## 2020-07-30 RX ORDER — SODIUM CHLORIDE 9 MG/ML
INJECTION, SOLUTION INTRAVENOUS CONTINUOUS
Status: DISCONTINUED | OUTPATIENT
Start: 2020-07-30 | End: 2020-08-01

## 2020-07-30 RX ORDER — OXYCODONE HYDROCHLORIDE AND ACETAMINOPHEN 5; 325 MG/1; MG/1
1 TABLET ORAL ONCE
Status: COMPLETED | OUTPATIENT
Start: 2020-07-30 | End: 2020-07-30

## 2020-07-30 RX ORDER — HEPARIN SODIUM 1000 [USP'U]/ML
80 INJECTION, SOLUTION INTRAVENOUS; SUBCUTANEOUS PRN
Status: DISCONTINUED | OUTPATIENT
Start: 2020-07-30 | End: 2020-07-31

## 2020-07-30 RX ORDER — BUTALBITAL, ACETAMINOPHEN AND CAFFEINE 50; 325; 40 MG/1; MG/1; MG/1
1 TABLET ORAL EVERY 4 HOURS PRN
Status: DISCONTINUED | OUTPATIENT
Start: 2020-07-30 | End: 2020-08-02 | Stop reason: HOSPADM

## 2020-07-30 RX ORDER — IPRATROPIUM BROMIDE AND ALBUTEROL SULFATE 2.5; .5 MG/3ML; MG/3ML
1 SOLUTION RESPIRATORY (INHALATION)
Status: DISCONTINUED | OUTPATIENT
Start: 2020-07-31 | End: 2020-07-31

## 2020-07-30 RX ORDER — THIAMINE MONONITRATE (VIT B1) 100 MG
100 TABLET ORAL DAILY
Status: DISCONTINUED | OUTPATIENT
Start: 2020-07-31 | End: 2020-08-02 | Stop reason: HOSPADM

## 2020-07-30 RX ORDER — CARVEDILOL 6.25 MG/1
6.25 TABLET ORAL 2 TIMES DAILY WITH MEALS
Status: DISCONTINUED | OUTPATIENT
Start: 2020-07-31 | End: 2020-08-01

## 2020-07-30 RX ORDER — ONDANSETRON 2 MG/ML
4 INJECTION INTRAMUSCULAR; INTRAVENOUS EVERY 6 HOURS PRN
Status: DISCONTINUED | OUTPATIENT
Start: 2020-07-30 | End: 2020-08-02 | Stop reason: HOSPADM

## 2020-07-30 RX ORDER — BUDESONIDE AND FORMOTEROL FUMARATE DIHYDRATE 80; 4.5 UG/1; UG/1
2 AEROSOL RESPIRATORY (INHALATION) 2 TIMES DAILY
Status: DISCONTINUED | OUTPATIENT
Start: 2020-07-31 | End: 2020-08-02 | Stop reason: HOSPADM

## 2020-07-30 RX ORDER — BENZONATATE 100 MG/1
100 CAPSULE ORAL 3 TIMES DAILY PRN
Status: DISCONTINUED | OUTPATIENT
Start: 2020-07-30 | End: 2020-08-02 | Stop reason: HOSPADM

## 2020-07-30 RX ORDER — ACETAMINOPHEN 325 MG/1
650 TABLET ORAL EVERY 6 HOURS PRN
Status: DISCONTINUED | OUTPATIENT
Start: 2020-07-30 | End: 2020-08-02 | Stop reason: HOSPADM

## 2020-07-30 RX ORDER — POLYETHYLENE GLYCOL 3350 17 G/17G
17 POWDER, FOR SOLUTION ORAL DAILY PRN
Status: DISCONTINUED | OUTPATIENT
Start: 2020-07-30 | End: 2020-08-02 | Stop reason: HOSPADM

## 2020-07-30 RX ORDER — CLOPIDOGREL BISULFATE 75 MG/1
75 TABLET ORAL DAILY
Status: DISCONTINUED | OUTPATIENT
Start: 2020-07-31 | End: 2020-08-02 | Stop reason: HOSPADM

## 2020-07-30 RX ORDER — ACETAMINOPHEN 650 MG/1
650 SUPPOSITORY RECTAL EVERY 6 HOURS PRN
Status: DISCONTINUED | OUTPATIENT
Start: 2020-07-30 | End: 2020-08-02 | Stop reason: HOSPADM

## 2020-07-30 RX ORDER — M-VIT,TX,IRON,MINS/CALC/FOLIC 27MG-0.4MG
1 TABLET ORAL DAILY
Status: DISCONTINUED | OUTPATIENT
Start: 2020-07-31 | End: 2020-08-02 | Stop reason: HOSPADM

## 2020-07-30 RX ORDER — 0.9 % SODIUM CHLORIDE 0.9 %
1000 INTRAVENOUS SOLUTION INTRAVENOUS ONCE
Status: DISCONTINUED | OUTPATIENT
Start: 2020-07-30 | End: 2020-07-30

## 2020-07-30 RX ORDER — UREA 10 %
1 LOTION (ML) TOPICAL NIGHTLY PRN
Status: DISCONTINUED | OUTPATIENT
Start: 2020-07-30 | End: 2020-08-02 | Stop reason: HOSPADM

## 2020-07-30 RX ADMIN — SODIUM CHLORIDE, PRESERVATIVE FREE 10 ML: 5 INJECTION INTRAVENOUS at 22:29

## 2020-07-30 RX ADMIN — OXYCODONE HYDROCHLORIDE AND ACETAMINOPHEN 1 TABLET: 5; 325 TABLET ORAL at 22:21

## 2020-07-30 RX ADMIN — HEPARIN SODIUM 3700 UNITS: 1000 INJECTION INTRAVENOUS; SUBCUTANEOUS at 20:11

## 2020-07-30 RX ADMIN — CEFTRIAXONE SODIUM 1 G: 1 INJECTION, POWDER, FOR SOLUTION INTRAMUSCULAR; INTRAVENOUS at 22:21

## 2020-07-30 RX ADMIN — Medication 500 MG: at 18:50

## 2020-07-30 RX ADMIN — Medication 18 UNITS/KG/HR: at 21:14

## 2020-07-30 RX ADMIN — SODIUM CHLORIDE 500 ML: 9 INJECTION, SOLUTION INTRAVENOUS at 17:19

## 2020-07-30 RX ADMIN — SODIUM CHLORIDE: 9 INJECTION, SOLUTION INTRAVENOUS at 21:14

## 2020-07-30 RX ADMIN — IOHEXOL 75 ML: 350 INJECTION, SOLUTION INTRAVENOUS at 18:09

## 2020-07-30 ASSESSMENT — PAIN DESCRIPTION - ORIENTATION: ORIENTATION: MID

## 2020-07-30 ASSESSMENT — ENCOUNTER SYMPTOMS
COUGH: 1
NAUSEA: 0
ABDOMINAL PAIN: 0
SHORTNESS OF BREATH: 1
SORE THROAT: 1
BACK PAIN: 0
ABDOMINAL PAIN: 1
VOMITING: 0

## 2020-07-30 ASSESSMENT — PAIN DESCRIPTION - FREQUENCY: FREQUENCY: CONTINUOUS

## 2020-07-30 ASSESSMENT — PAIN DESCRIPTION - ONSET: ONSET: ON-GOING

## 2020-07-30 ASSESSMENT — PAIN - FUNCTIONAL ASSESSMENT: PAIN_FUNCTIONAL_ASSESSMENT: PREVENTS OR INTERFERES SOME ACTIVE ACTIVITIES AND ADLS

## 2020-07-30 ASSESSMENT — PAIN DESCRIPTION - PAIN TYPE: TYPE: ACUTE PAIN

## 2020-07-30 ASSESSMENT — PAIN DESCRIPTION - DESCRIPTORS: DESCRIPTORS: ACHING;DISCOMFORT

## 2020-07-30 ASSESSMENT — PAIN SCALES - GENERAL
PAINLEVEL_OUTOF10: 8

## 2020-07-30 ASSESSMENT — PAIN DESCRIPTION - LOCATION: LOCATION: BACK;LEG

## 2020-07-30 NOTE — ED PROVIDER NOTES
Faculty Sign-Out Attestation  Handoff taken on the following patient from prior Attending Physician: Marii Braun    I was available and discussed any additional care issues that arose and coordinated the management plans with the resident(s) caring for the patient during my duty period. Any areas of disagreement with residents documentation of care or procedures are noted on the chart. I was personally present for the key portions of any/all procedures during my duty period. I have documented in the chart those procedures where I was not present during the key portions. Patient brought in with altered mental status that has resolved. However was hypoxic does not wear oxygen at home. Is still requiring supplemental oxygen. Abnormal chest x-ray, elevated BNP, CRYSTAL. Delay in IV access for CT chest, probable admit. CT CHEST PULMONARY EMBOLISM W CONTRAST   Final Result   Few scattered small segmental subsegmental PE identified involving right   lower lobe. Masslike opacity involving left lower lobe favor pneumonia. Follow-up to   assure resolution following medical treatment course recommended. Critical results were called by Dr. Christi Myers to [facility] on   7/30/2020 at 18:45. XR CHEST PORTABLE   Final Result   Left hilar masslike opacity with worsening atelectasis involving the left   lower lobe compared to the prior study from July 27th. Malignancy is   suspected with possible developing compression involving the left lower lobe   bronchus. Developing superimposed pneumonia cannot be excluded. Further   evaluation with CT is recommended.                Deana Mejia MD  Attending Physician       Deana Mejia MD  07/30/20 9048

## 2020-07-30 NOTE — ED NOTES
Pt back from [address] [place] [address], [facility]  07/30/20 1040

## 2020-07-30 NOTE — ED PROVIDER NOTES
[address] [facility]     Emergency Department     Faculty Attestation    I performed a history and physical examination of the patient and discussed management with the resident. I have reviewed and agree with the residents findings including all diagnostic interpretations, and treatment plans as written at the time of my review. Any areas of disagreement are noted on the chart. I was personally present for the key portions of any procedures. I have documented in the chart those procedures where I was not present during the key portions. For Physician Assistant/ Nurse Practitioner cases/documentation I have personally evaluated this patient and have completed at least one if not all key elements of the E/M (history, physical exam, and MDM). Additional findings are as noted. This patient was evaluated in the Emergency Department for symptoms described in the history of present illness. The patient was evaluated in the context of the global COVID-19 pandemic, which necessitated consideration that the patient might be at risk for infection with the SARS-CoV-2 virus that causes COVID-19. Institutional protocols and algorithms that pertain to the evaluation of patients at risk for COVID-19 are in a state of rapid change based on information released by regulatory bodies including the CDC and federal and state organizations. These policies and algorithms were followed during the patient's care in the ED. Primary Care Physician: No primary care provider on file. History: This is a 77 y.o. female who presents to the Emergency Department with complaint of for bilateral quad 7 from 1 to round. Patient denies any complaints. Although the triage note says altered mental status she is awake alert oriented x4. She says she does not want any additional help and would like to leave. Physical:   height is 4' 11\" (1.499 m) and weight is 102 lb (46.3 kg).  Her oral temperature is 98.3 °F (36.8 °C). Her blood pressure is 143/91 (abnormal) and her pulse is 100. Her respiration is 16 and oxygen saturation is 94%. Patient is awake alert oriented x3 moving all extremities nontoxic-appearing she is tachycardic      Plan: Patient will be discharged at this time. She is encouraged to follow-up for any concerns. She was told return to emergency department for any concerns. Part of the discharge patient states that now she is \"not feeling well\". She will not be more specific. Oxygen saturation did drop to 88% on room air. She is now agreeable to further work-up. EKG interpretation  I interpreted EKG  Normal sinus rhythm with a ventricular rate of 90, normal ME interval, normal QRS duration, possible left atrial lodgment, normal axis, T wave abnormality consider inferior lateral ischemia, normal QT corrected  Impression: Normal sinus rhythm, possible left atrial lodgment, T wave abnormality, consider inferolateral ischemia  Compared to EKG of July 27, 2020,            (Please note that portions of this note were completed with a voice recognition program.  Efforts were made to edit the dictations but occasionally words are mis-transcribed.)    Susa Colt P. Paulla Bence, MD, [facility]  Attending Emergency Medicine Physician           Remy Rowe MD  07/30/20 6029

## 2020-07-30 NOTE — ED NOTES
CXR first d/t spo2 88-92% on room air. Pt agreeable for xray.      Iris Villarreal RN  07/30/20 [id number]

## 2020-07-30 NOTE — ED PROVIDER NOTES
[address]  ED  Emergency Department Encounter  Emergency Medicine Resident     Pt Name: Jasvir Tovar  MRN: [id number]  [place] 1955  Date of evaluation: 7/30/20  PCP:  No primary care provider on file. CHIEF COMPLAINT       Chief Complaint   Patient presents with    Altered Mental Status     pt brought to ED via LS 7 stating that she was wandering around but is A&O x 4        HISTORY OFPRESENT ILLNESS  (Location/Symptom, Timing/Onset, Context/Setting, Quality, Duration, Modifying Factors,Severity.)      Emi Daniels is a 77 y. o.yo female who presents with headache. she is having a headache, was saturating 88 percentile on room air with a good waveform when I saw the patient, seems sleepy was given Benadryl and Compazine for her migraine when she was here in the emergency department in the morning, did elope before treatment was complete, had a chest x-ray that did show a hilar opacity which was there before, no pneumonia on the right lung, states that she is here in the emergency department because she has a headache still and would like to be treated. Says that she has subjective fevers and chills at home.     PAST MEDICAL / SURGICAL / SOCIAL / FAMILY HISTORY      has a past medical history of Appendicitis, Cerebral artery occlusion with cerebral infarction ([address] CHF (congestive heart failure) (HCC), Chronic respiratory failure with hypoxia and hypercapnia (HCC), Chronic rhinitis, Cigarette smoker, COPD (chronic obstructive pulmonary disease) ([address] Depression, Dysphagia, Essential hypertension, GERD (gastroesophageal reflux disease), Heart failure, diastolic, with acute decompensation ([address] Hepatitis C, chronic ([address] History of smoking at least 1 pack per day for at least 30 years, Hyperlipidemia, Hypertension, Migraine, Moderate COPD (chronic obstructive pulmonary disease) ([address] Multiple transfusions, Neurogenic dysphagia, Osteoarthritis, Pneumonia, Scoliosis, and Substance abuse ([facility] has a past surgical history that includes  section; LEEP (); Upper gastrointestinal endoscopy (); Gastric bypass surgery; Appendectomy (2017); laparoscopic appendectomy (N/A, 3/27/2017); Upper gastrointestinal endoscopy (2018); Upper gastrointestinal endoscopy (N/A, 2018); pr colonoscopy w/biopsy single/multiple (N/A, 11/10/2018); pr egd transoral biopsy single/multiple (N/A, 2018); hip surgery (Right, 2019); and Total hip arthroplasty (Right, 2019).      Social History     Socioeconomic History    Marital status: Single     Spouse name: Not on file    Number of children: Not on file    Years of education: Not on file    Highest education level: Not on file   Occupational History    Not on file   Social Needs    Financial resource strain: Not on file    Food insecurity     Worry: Not on file     Inability: Not on file    Transportation needs     Medical: Not on file     Non-medical: Not on file   Tobacco Use    Smoking status: Former Smoker     Packs/day: 1.00     Years: 40.00     Pack years: 40.00     Types: Cigarettes     Start date:      Last attempt to quit: 2018     Years since quittin.7    Smokeless tobacco: Never Used   Substance and Sexual Activity    Alcohol use: No     Alcohol/week: 0.0 standard drinks    Drug use: No     Types: IV, Cocaine     Comment: past history    Sexual activity: Not on file   Lifestyle    Physical activity     Days per week: Not on file     Minutes per session: Not on file    Stress: Not on file   Relationships    Social connections     Talks on phone: Not on file     Gets together: Not on file     Attends Jehovah's witness service: Not on file     Active member of club or organization: Not on file     Attends meetings of clubs or organizations: Not on file     Relationship status: Not on file    Intimate partner violence     Fear of current or ex partner: Not on file     Emotionally abused: Not on file     Physically abused: Not on file     Forced sexual activity: Not on file   Other Topics Concern    Not on file   Social History Narrative    Not on file       Family History   Problem Relation Age of Onset    Breast Cancer Mother     Heart Disease Father         Allergies:  Aspirin and Keflet [cephalexin]    Home Medications:  Prior to Admission medications    Medication Sig Start Date End Date Taking? Authorizing Provider   doxycycline monohydrate (ADOXA) 100 MG tablet Take 1 tablet by mouth 2 times daily for 10 days 7/24/20 8/3/20  Charlotte Bernard, DO   docusate sodium (COLACE) 100 MG capsule Take 1 capsule by mouth 2 times daily for 14 days 7/16/20 7/30/20  Boraalec Healy, DO   ibuprofen (ADVIL;MOTRIN) 600 MG tablet Take 1 tablet by mouth 4 times daily as needed for Pain 7/16/20 7/30/20  Boraalec Healy, DO   senna (SENOKOT) 8.6 MG TABS tablet Take 1 tablet by mouth daily for 14 days 7/16/20 7/30/20  Boraalec Healy, DO   diclofenac (VOLTAREN) 50 MG EC tablet Take 1 tablet by mouth 2 times daily 4/6/20   Allison Perez,    amitriptyline (ELAVIL) 10 MG tablet Take 1 tablet by mouth nightly 1/31/20   Annel Andino,    atorvastatin (LIPITOR) 40 MG tablet Take 1 tablet by mouth nightly 1/31/20   [place],    butalbital-acetaminophen-caffeine (FIORICET, ESGIC) -40 MG per tablet Take 1 tablet by mouth every 4 hours as needed for Headaches 1/31/20   [place],    clopidogrel (PLAVIX) 75 MG tablet Take 1 tablet by mouth daily 2/1/20   Annel Andino,    albuterol sulfate HFA (PROVENTIL HFA) 108 (90 Base) MCG/ACT inhaler Inhale 1-2 puffs into the lungs once for 1 dose 1/9/20 6/15/20  Kavita Gracia MD   ondansetron (ZOFRAN ODT) 4 MG disintegrating tablet Take 1 tablet by mouth every 8 hours as needed for Nausea 9/9/19   Carolina Angel PA-C   docusate sodium (COLACE, DULCOLAX) 100 MG CAPS Take 100 mg by mouth 2 times daily 8/22/19   Thais Reynolds MD   [facility].  Devices [facility]) MISC 1 Device by Does not apply route once for 1 dose 8/5/19 8/5/19  Carolina Angel PA-C   hydrOXYzine (ATARAX) 25 MG tablet Take 1 tablet by mouth every 8 hours as needed for Itching 8/5/19   Carolina Angel PA-C   benzonatate (TESSALON PERLES) 100 MG capsule Take 1 capsule by mouth 3 times daily as needed for Cough 3/11/19   Rodrigo Lopez DO   carvedilol (COREG) 6.25 MG tablet Take 1 tablet by mouth 2 times daily (with meals) 2/7/19   Cathy Morales MD   ferrous sulfate (MELISSA-HAO) 325 (65 Fe) MG tablet Take 1 tablet by mouth daily 2/7/19   Cathy Morales MD   melatonin 1 MG tablet Take 1 tablet by mouth nightly as needed for Sleep 1/11/19   Lyric Mckay MD   ipratropium-albuterol (DUONEB) 0.5-2.5 (3) MG/3ML SOLN nebulizer solution Inhale 3 mLs into the lungs every 4 hours as needed for Shortness of Breath 1/11/19   Lyric Mckay MD   mometasone-formoterol [facility]) 100-5 MCG/ACT inhaler Inhale 2 puffs into the lungs 2 times daily 12/5/18   Joshua Sheldon MD   pantoprazole (PROTONIX) 40 MG tablet Take 1 tablet by mouth 2 times daily (before meals) 11/13/18   Sandra Gutierrez MD   folic acid (FOLVITE) 1 MG tablet Take 1 tablet by mouth daily 10/13/18   Stephanie Marques MD   vitamin B-1 100 MG tablet Take 1 tablet by mouth daily 10/13/18   Stephanie Marques MD   vitamin B-12 250 MCG tablet Take 1 tablet by mouth daily 10/13/18   Stephanie Marques MD   gabapentin (NEURONTIN) 300 MG capsule Take 300 mg by mouth 3 times daily. Abdon Flood Historical Provider, MD   Multiple Vitamins-Minerals (THERAPEUTIC MULTIVITAMIN-MINERALS) tablet Take 1 tablet by mouth daily 8/12/18   Maggie Cerda MD       REVIEW OFSYSTEMS    (2-9 systems for level 4, 10 or more for level 5)      Review of Systems   Constitutional: Negative for diaphoresis and fever. HENT: Negative for congestion. Eyes: Negative for visual disturbance. Respiratory: Positive for cough and shortness of breath.     Cardiovascular: Negative for chest pain.   Gastrointestinal: Negative for abdominal pain, nausea and vomiting. Endocrine: Negative for polyuria. Genitourinary: Negative for dysuria. Musculoskeletal: Negative for back pain. Skin: Negative for wound. Neurological: Positive for headaches. Psychiatric/Behavioral: Negative for confusion. PHYSICAL EXAM   (up to 7 for level 4, 8 or more forlevel 5)      ED TRIAGE VITALS BP: (!) 143/91, Temp: 98.3 °F (36.8 °C), Pulse: 100, Resp: 16, SpO2: 94 %    Vitals:    07/30/20 1733 07/30/20 1735 07/30/20 1820 07/30/20 1858   BP:    (!) 141/88   Pulse:    93   Resp:    12   Temp:       TempSrc:       SpO2: 100% 99% 99% 99%   Weight:       Height:           Physical Exam  Constitutional:       General: She is not in acute distress. Appearance: She is well-developed. HENT:      Head: Normocephalic and atraumatic. Nose: Nose normal.   Eyes:      Pupils: Pupils are equal, round, and reactive to light. Neck:      Musculoskeletal: Normal range of motion and neck supple. Cardiovascular:      Rate and Rhythm: Normal rate and regular rhythm. Heart sounds: No murmur. Pulmonary:      Effort: Pulmonary effort is normal. No respiratory distress. Breath sounds: No stridor. No wheezing. Abdominal:      General: There is no distension. Palpations: Abdomen is soft. Tenderness: There is no abdominal tenderness. Musculoskeletal: Normal range of motion. General: No tenderness. Skin:     General: Skin is warm and dry. Capillary Refill: Capillary refill takes less than 2 seconds. Findings: No erythema or rash. Neurological:      Mental Status: She is alert and oriented to person, place, and time. Sensory: No sensory deficit.       Deep Tendon Reflexes: Reflexes normal.   Psychiatric:         Behavior: Behavior normal.         DIFFERENTIAL  DIAGNOSIS     PLAN (LABS / IMAGING / EKG):  Orders Placed This Encounter   Procedures    XR CHEST PORTABLE    CT CHEST Basophils 0 0 - 2 %    Absolute Immature Granulocyte 0.63 (H) 0.00 - 0.30 k/uL    Segs Absolute 8.73 (H) 1.8 - 7.7 k/uL    Absolute Lymph # 2.13 1.0 - 4.8 k/uL    Absolute Mono # 0.88 (H) 0.1 - 0.8 k/uL    Absolute Eos # 0.13 0.0 - 0.4 k/uL    Basophils Absolute 0.00 0.0 - 0.2 k/uL    Morphology ANISOCYTOSIS PRESENT    Comprehensive Metabolic Panel w/ Reflex to MG   Result Value Ref Range    Glucose 94 70 - 99 mg/dL    BUN 26 (H) 8 - 23 mg/dL    CREATININE 1.37 (H) 0.50 - 0.90 mg/dL    Bun/Cre Ratio NOT REPORTED 9 - 20    Calcium 9.9 8.6 - 10.4 mg/dL    Sodium 140 135 - 144 mmol/L    Potassium 4.0 3.7 - 5.3 mmol/L    Chloride 106 98 - 107 mmol/L    CO2 16 (L) 20 - 31 mmol/L    Anion Gap 18 (H) 9 - 17 mmol/L    Alkaline Phosphatase 71 35 - 104 U/L    ALT 8 5 - 33 U/L    AST 19 <32 U/L    Total Bilirubin 0.38 0.3 - 1.2 mg/dL    Total Protein 8.4 (H) 6.4 - 8.3 g/dL    Alb 3.5 3.5 - 5.2 g/dL    Albumin/Globulin Ratio 0.7 (L) 1.0 - 2.5    GFR Non- 38 (L) >60 mL/min    GFR  47 (L) >60 mL/min    GFR Comment          GFR Staging NOT REPORTED    Troponin   Result Value Ref Range    Troponin, High Sensitivity 19 (H) 0 - 14 ng/L    Troponin T NOT REPORTED <0.03 ng/mL    Troponin Interp NOT REPORTED    Brain Natriuretic Peptide   Result Value Ref Range    Pro-BNP 1,101 (H) <300 pg/mL    BNP Interpretation Pro-BNP Reference Range:        RADIOLOGY:  CT CHEST PULMONARY EMBOLISM W CONTRAST   Final Result   Few scattered small segmental subsegmental PE identified involving right   lower lobe. Masslike opacity involving left lower lobe favor pneumonia. Follow-up to   assure resolution following medical treatment course recommended. Critical results were called by Dr. Georgi Chauhan to [facility] on   7/30/2020 at 18:45.          XR CHEST PORTABLE   Final Result   Left hilar masslike opacity with worsening atelectasis involving the left   lower lobe compared to the prior study from July

## 2020-07-30 NOTE — PROGRESS NOTES
Patient well known to PICC team. PICC team able to insert 1 PIV in the rt [facility]. Bilateral upper and lower arms assessed for PIV placement and no other access points are visible on U/S. If patient needs more access would recommend a central line, IJ, or EJ. Continue to monitor.

## 2020-07-30 NOTE — ED NOTES
Pt O2 increased to 4 L via NC. Pt sat increased. No apparent distress. Will continue to monitor.       Benedicto Horne RN  07/30/20 [id number]

## 2020-07-30 NOTE — ED TRIAGE NOTES
Pt brought in by EMS for confusion, and for wandering around. Pt is A&Ox 4 but not cooporative in answering questions, and states \"she does not want to be here\". Pt is disheveled, poor hygiene, and refusing to talk to attending and resident. Pt is refusing to be treated and would like to leave. Pt has been placed on 3 L O2 via NC to improve saturation.

## 2020-07-30 NOTE — ED NOTES
Report received, pt asleep at this time, easily awakened, no resp distress at this time, azithromycin gtt infusing without signs of infiltration, heparin gtt to be started after antibiotic is completed as given in report from [facility], continue to monitor, pt updated with admission plans, bed being cleaned at this time     [address]  07/30/20 6685

## 2020-07-30 NOTE — ED NOTES
Report given to [facility] RN, all questions answered     Montez Oconnell RN  07/30/20 9485

## 2020-07-31 PROBLEM — E46 PROTEIN CALORIE MALNUTRITION (HCC): Status: ACTIVE | Noted: 2019-09-22

## 2020-07-31 LAB
-: NORMAL
ABSOLUTE EOS #: 0.14 K/UL (ref 0–0.44)
ABSOLUTE IMMATURE GRANULOCYTE: 0.38 K/UL (ref 0–0.3)
ABSOLUTE LYMPH #: 2.26 K/UL (ref 1.1–3.7)
ABSOLUTE MONO #: 0.71 K/UL (ref 0.1–1.2)
ABSOLUTE RETIC #: 0.06 M/UL (ref 0.03–0.08)
AMORPHOUS: NORMAL
AMPHETAMINE SCREEN URINE: NEGATIVE
ANION GAP SERPL CALCULATED.3IONS-SCNC: 20 MMOL/L (ref 9–17)
BACTERIA: NORMAL
BARBITURATE SCREEN URINE: POSITIVE
BASOPHILS # BLD: 1 % (ref 0–2)
BASOPHILS ABSOLUTE: 0.08 K/UL (ref 0–0.2)
BENZODIAZEPINE SCREEN, URINE: NEGATIVE
BILIRUBIN URINE: NEGATIVE
BUN BLDV-MCNC: 20 MG/DL (ref 8–23)
BUN/CREAT BLD: ABNORMAL (ref 9–20)
BUPRENORPHINE URINE: ABNORMAL
C-REACTIVE PROTEIN: 57.4 MG/L (ref 0–5)
CALCIUM SERPL-MCNC: 9.3 MG/DL (ref 8.6–10.4)
CANNABINOID SCREEN URINE: NEGATIVE
CASTS UA: NORMAL /LPF (ref 0–8)
CHLORIDE BLD-SCNC: 107 MMOL/L (ref 98–107)
CO2: 12 MMOL/L (ref 20–31)
COCAINE METABOLITE, URINE: NEGATIVE
COLOR: ABNORMAL
COMMENT UA: ABNORMAL
CREAT SERPL-MCNC: 0.8 MG/DL (ref 0.5–0.9)
CRYSTALS, UA: NORMAL /HPF
DIFFERENTIAL TYPE: ABNORMAL
EOSINOPHILS RELATIVE PERCENT: 1 % (ref 1–4)
EPITHELIAL CELLS UA: NORMAL /HPF (ref 0–5)
FERRITIN: 138 UG/L (ref 13–150)
FOLATE: 16.7 NG/ML
GFR AFRICAN AMERICAN: >60 ML/MIN
GFR NON-AFRICAN AMERICAN: >60 ML/MIN
GFR SERPL CREATININE-BSD FRML MDRD: ABNORMAL ML/MIN/{1.73_M2}
GFR SERPL CREATININE-BSD FRML MDRD: ABNORMAL ML/MIN/{1.73_M2}
GLUCOSE BLD-MCNC: 107 MG/DL (ref 65–105)
GLUCOSE BLD-MCNC: 48 MG/DL (ref 70–99)
GLUCOSE BLD-MCNC: 75 MG/DL (ref 65–105)
GLUCOSE BLD-MCNC: 98 MG/DL (ref 65–105)
GLUCOSE URINE: NEGATIVE
HCT VFR BLD CALC: 39.8 % (ref 36.3–47.1)
HEMOGLOBIN: 11.8 G/DL (ref 11.9–15.1)
IMMATURE GRANULOCYTES: 3 %
IMMATURE RETIC FRACT: 19 % (ref 2.7–18.3)
IRON SATURATION: 14 % (ref 20–55)
IRON: 36 UG/DL (ref 37–145)
KETONES, URINE: NEGATIVE
LEUKOCYTE ESTERASE, URINE: NEGATIVE
LYMPHOCYTES # BLD: 19 % (ref 24–43)
MCH RBC QN AUTO: 31.1 PG (ref 25.2–33.5)
MCHC RBC AUTO-ENTMCNC: 29.6 G/DL (ref 28.4–34.8)
MCV RBC AUTO: 104.7 FL (ref 82.6–102.9)
MDMA URINE: ABNORMAL
METHADONE SCREEN, URINE: NEGATIVE
METHAMPHETAMINE, URINE: ABNORMAL
MONOCYTES # BLD: 6 % (ref 3–12)
MUCUS: NORMAL
NITRITE, URINE: NEGATIVE
NRBC AUTOMATED: 0 PER 100 WBC
OPIATES, URINE: NEGATIVE
OTHER OBSERVATIONS UA: NORMAL
OXYCODONE SCREEN URINE: POSITIVE
PARTIAL THROMBOPLASTIN TIME: >120 SEC (ref 20.5–30.5)
PDW BLD-RTO: 17.2 % (ref 11.8–14.4)
PH UA: 6.5 (ref 5–8)
PHENCYCLIDINE, URINE: NEGATIVE
PLATELET # BLD: 734 K/UL (ref 138–453)
PLATELET ESTIMATE: ABNORMAL
PMV BLD AUTO: 9.2 FL (ref 8.1–13.5)
POTASSIUM SERPL-SCNC: 4.1 MMOL/L (ref 3.7–5.3)
PROPOXYPHENE, URINE: ABNORMAL
PROTEIN UA: ABNORMAL
RBC # BLD: 3.8 M/UL (ref 3.95–5.11)
RBC # BLD: ABNORMAL 10*6/UL
RBC UA: NORMAL /HPF (ref 0–4)
REASON FOR REJECTION: NORMAL
RENAL EPITHELIAL, UA: NORMAL /HPF
RETIC %: 1.6 % (ref 0.5–1.9)
RETIC HEMOGLOBIN: 31.4 PG (ref 28.2–35.7)
SEG NEUTROPHILS: 70 % (ref 36–65)
SEGMENTED NEUTROPHILS ABSOLUTE COUNT: 8.3 K/UL (ref 1.5–8.1)
SODIUM BLD-SCNC: 139 MMOL/L (ref 135–144)
SPECIFIC GRAVITY UA: 1.04 (ref 1–1.03)
TEST INFORMATION: ABNORMAL
TOTAL IRON BINDING CAPACITY: 249 UG/DL (ref 250–450)
TRANSFERRIN: 197 MG/DL (ref 200–360)
TRICHOMONAS: NORMAL
TRICYCLIC ANTIDEPRESSANTS, UR: ABNORMAL
TURBIDITY: CLEAR
UNSATURATED IRON BINDING CAPACITY: 213 UG/DL (ref 112–347)
URINE HGB: NEGATIVE
UROBILINOGEN, URINE: NORMAL
VITAMIN B-12: 379 PG/ML (ref 232–1245)
WBC # BLD: 11.9 K/UL (ref 3.5–11.3)
WBC # BLD: ABNORMAL 10*3/UL
WBC UA: NORMAL /HPF (ref 0–5)
YEAST: NORMAL
ZZ NTE CLEAN UP: ORDERED TEST: NORMAL
ZZ NTE WITH NAME CLEAN UP: SPECIMEN SOURCE: NORMAL

## 2020-07-31 PROCEDURE — 85025 COMPLETE CBC W/AUTO DIFF WBC: CPT

## 2020-07-31 PROCEDURE — 82728 ASSAY OF FERRITIN: CPT

## 2020-07-31 PROCEDURE — 82746 ASSAY OF FOLIC ACID SERUM: CPT

## 2020-07-31 PROCEDURE — 83540 ASSAY OF IRON: CPT

## 2020-07-31 PROCEDURE — 82607 VITAMIN B-12: CPT

## 2020-07-31 PROCEDURE — 2060000000 HC ICU INTERMEDIATE R&B

## 2020-07-31 PROCEDURE — 6360000002 HC RX W HCPCS: Performed by: EMERGENCY MEDICINE

## 2020-07-31 PROCEDURE — 99255 IP/OBS CONSLTJ NEW/EST HI 80: CPT | Performed by: INTERNAL MEDICINE

## 2020-07-31 PROCEDURE — 36415 COLL VENOUS BLD VENIPUNCTURE: CPT

## 2020-07-31 PROCEDURE — 36600 WITHDRAWAL OF ARTERIAL BLOOD: CPT

## 2020-07-31 PROCEDURE — 6370000000 HC RX 637 (ALT 250 FOR IP): Performed by: STUDENT IN AN ORGANIZED HEALTH CARE EDUCATION/TRAINING PROGRAM

## 2020-07-31 PROCEDURE — 2580000003 HC RX 258: Performed by: STUDENT IN AN ORGANIZED HEALTH CARE EDUCATION/TRAINING PROGRAM

## 2020-07-31 PROCEDURE — 85730 THROMBOPLASTIN TIME PARTIAL: CPT

## 2020-07-31 PROCEDURE — 82947 ASSAY GLUCOSE BLOOD QUANT: CPT

## 2020-07-31 PROCEDURE — 85045 AUTOMATED RETICULOCYTE COUNT: CPT

## 2020-07-31 PROCEDURE — 6360000002 HC RX W HCPCS: Performed by: STUDENT IN AN ORGANIZED HEALTH CARE EDUCATION/TRAINING PROGRAM

## 2020-07-31 PROCEDURE — 87641 MR-STAPH DNA AMP PROBE: CPT

## 2020-07-31 PROCEDURE — 94640 AIRWAY INHALATION TREATMENT: CPT

## 2020-07-31 PROCEDURE — 81001 URINALYSIS AUTO W/SCOPE: CPT

## 2020-07-31 PROCEDURE — 2700000000 HC OXYGEN THERAPY PER DAY

## 2020-07-31 PROCEDURE — 84466 ASSAY OF TRANSFERRIN: CPT

## 2020-07-31 PROCEDURE — 86140 C-REACTIVE PROTEIN: CPT

## 2020-07-31 PROCEDURE — 83550 IRON BINDING TEST: CPT

## 2020-07-31 PROCEDURE — 94760 N-INVAS EAR/PLS OXIMETRY 1: CPT

## 2020-07-31 PROCEDURE — 99232 SBSQ HOSP IP/OBS MODERATE 35: CPT | Performed by: INTERNAL MEDICINE

## 2020-07-31 PROCEDURE — 2580000003 HC RX 258: Performed by: EMERGENCY MEDICINE

## 2020-07-31 PROCEDURE — 80048 BASIC METABOLIC PNL TOTAL CA: CPT

## 2020-07-31 PROCEDURE — 80307 DRUG TEST PRSMV CHEM ANLYZR: CPT

## 2020-07-31 RX ORDER — DEXTROSE MONOHYDRATE 50 MG/ML
100 INJECTION, SOLUTION INTRAVENOUS PRN
Status: DISCONTINUED | OUTPATIENT
Start: 2020-07-31 | End: 2020-08-02 | Stop reason: HOSPADM

## 2020-07-31 RX ORDER — NICOTINE POLACRILEX 4 MG
15 LOZENGE BUCCAL PRN
Status: DISCONTINUED | OUTPATIENT
Start: 2020-07-31 | End: 2020-08-02 | Stop reason: HOSPADM

## 2020-07-31 RX ORDER — DEXTROSE MONOHYDRATE 25 G/50ML
12.5 INJECTION, SOLUTION INTRAVENOUS PRN
Status: DISCONTINUED | OUTPATIENT
Start: 2020-07-31 | End: 2020-08-02 | Stop reason: HOSPADM

## 2020-07-31 RX ORDER — HALOPERIDOL 5 MG/ML
2 INJECTION INTRAMUSCULAR ONCE
Status: COMPLETED | OUTPATIENT
Start: 2020-07-31 | End: 2020-07-31

## 2020-07-31 RX ORDER — LEVOFLOXACIN 750 MG/1
750 TABLET ORAL DAILY
Status: DISCONTINUED | OUTPATIENT
Start: 2020-08-01 | End: 2020-08-02 | Stop reason: HOSPADM

## 2020-07-31 RX ORDER — IPRATROPIUM BROMIDE AND ALBUTEROL SULFATE 2.5; .5 MG/3ML; MG/3ML
1 SOLUTION RESPIRATORY (INHALATION) 3 TIMES DAILY
Status: DISCONTINUED | OUTPATIENT
Start: 2020-07-31 | End: 2020-08-02 | Stop reason: HOSPADM

## 2020-07-31 RX ADMIN — PANTOPRAZOLE SODIUM 40 MG: 40 TABLET, DELAYED RELEASE ORAL at 16:45

## 2020-07-31 RX ADMIN — MULTIPLE VITAMINS W/ MINERALS TAB 1 TABLET: TAB at 08:25

## 2020-07-31 RX ADMIN — IPRATROPIUM BROMIDE AND ALBUTEROL SULFATE 1 AMPULE: .5; 3 SOLUTION RESPIRATORY (INHALATION) at 14:46

## 2020-07-31 RX ADMIN — CLOPIDOGREL 75 MG: 75 TABLET, FILM COATED ORAL at 08:25

## 2020-07-31 RX ADMIN — VANCOMYCIN HYDROCHLORIDE 750 MG: 10 INJECTION, POWDER, LYOPHILIZED, FOR SOLUTION INTRAVENOUS at 12:23

## 2020-07-31 RX ADMIN — BENZONATATE 100 MG: 100 CAPSULE ORAL at 08:25

## 2020-07-31 RX ADMIN — BUDESONIDE AND FORMOTEROL FUMARATE DIHYDRATE 2 PUFF: 80; 4.5 AEROSOL RESPIRATORY (INHALATION) at 08:09

## 2020-07-31 RX ADMIN — Medication 100 MG: at 08:25

## 2020-07-31 RX ADMIN — IPRATROPIUM BROMIDE AND ALBUTEROL SULFATE 1 AMPULE: .5; 3 SOLUTION RESPIRATORY (INHALATION) at 08:09

## 2020-07-31 RX ADMIN — AZITHROMYCIN MONOHYDRATE 500 MG: 500 INJECTION, POWDER, LYOPHILIZED, FOR SOLUTION INTRAVENOUS at 08:27

## 2020-07-31 RX ADMIN — CARVEDILOL 6.25 MG: 6.25 TABLET, FILM COATED ORAL at 08:24

## 2020-07-31 RX ADMIN — CARVEDILOL 6.25 MG: 6.25 TABLET, FILM COATED ORAL at 16:45

## 2020-07-31 RX ADMIN — ACETAMINOPHEN 650 MG: 325 TABLET ORAL at 08:25

## 2020-07-31 RX ADMIN — APIXABAN 10 MG: 5 TABLET, FILM COATED ORAL at 12:37

## 2020-07-31 RX ADMIN — HALOPERIDOL LACTATE 2 MG: 5 INJECTION, SOLUTION INTRAMUSCULAR at 21:19

## 2020-07-31 RX ADMIN — PANTOPRAZOLE SODIUM 40 MG: 40 TABLET, DELAYED RELEASE ORAL at 08:23

## 2020-07-31 ASSESSMENT — ENCOUNTER SYMPTOMS
COUGH: 1
SHORTNESS OF BREATH: 1
ABDOMINAL PAIN: 1

## 2020-07-31 ASSESSMENT — PAIN SCALES - GENERAL
PAINLEVEL_OUTOF10: 0
PAINLEVEL_OUTOF10: 5
PAINLEVEL_OUTOF10: 0
PAINLEVEL_OUTOF10: 0
PAINLEVEL_OUTOF10: 5

## 2020-07-31 NOTE — ED NOTES
Vicky Guaman RN updated with pt's status, no distress, pt to floor per stretcher     Max Glaser, ANTELMO  07/30/20 [id number]

## 2020-07-31 NOTE — PROGRESS NOTES
[facility]COSO[facility] Pulmonary embolism on right ([address] [I26.99] . Vitals:    07/31/20 0814   BP:    Pulse:    Resp: 16   Temp:    SpO2: 97%   . Patients home meds are   Prior to Admission medications    Medication Sig Start Date End Date Taking? Authorizing Provider   doxycycline monohydrate (ADOXA) 100 MG tablet Take 1 tablet by mouth 2 times daily for 10 days 7/24/20 8/3/20  Gabrielle Low, DO   ibuprofen (ADVIL;MOTRIN) 600 MG tablet Take 1 tablet by mouth 4 times daily as needed for Pain 7/16/20 7/30/20  Zainab Torres, DO   diclofenac (VOLTAREN) 50 MG EC tablet Take 1 tablet by mouth 2 times daily 4/6/20   Bradley Spangler, DO   amitriptyline (ELAVIL) 10 MG tablet Take 1 tablet by mouth nightly 1/31/20   Shaw Thornton, DO   atorvastatin (LIPITOR) 40 MG tablet Take 1 tablet by mouth nightly 1/31/20   Shaw Thornton, DO   butalbital-acetaminophen-caffeine (FIORICET, ESGIC) -40 MG per tablet Take 1 tablet by mouth every 4 hours as needed for Headaches 1/31/20   Shaw Thornton, DO   clopidogrel (PLAVIX) 75 MG tablet Take 1 tablet by mouth daily 2/1/20   Annel Andino,    albuterol sulfate HFA (PROVENTIL HFA) 108 (90 Base) MCG/ACT inhaler Inhale 1-2 puffs into the lungs once for 1 dose 1/9/20 6/15/20  Jenny Holloway MD   ondansetron (ZOFRAN ODT) 4 MG disintegrating tablet Take 1 tablet by mouth every 8 hours as needed for Nausea 9/9/19   Gary Wetzel PA-C   docusate sodium (COLACE, DULCOLAX) 100 MG CAPS Take 100 mg by mouth 2 times daily 8/22/19   Dong Gallardo MD   [facility].  Devices [facility]) MISC 1 Device by Does not apply route once for 1 dose 8/5/19 8/5/19  Gary Wetzel PA-C   hydrOXYzine (ATARAX) 25 MG tablet Take 1 tablet by mouth every 8 hours as needed for Itching 8/5/19   Gary Wetzel PA-C   benzonatate (TESSALON PERLES) 100 MG capsule Take 1 capsule by mouth 3 times daily as needed for Cough 3/11/19   Rhiannon Lopez DO   carvedilol (COREG) 6.25 MG tablet Take 1 tablet by mouth 2 times daily (with meals) 2/7/19   Kirk Almendarez MD   ferrous sulfate (MELISSA-HAO) 325 (65 Fe) MG tablet Take 1 tablet by mouth daily 2/7/19   Kirk Almendarez MD   melatonin 1 MG tablet Take 1 tablet by mouth nightly as needed for Sleep 1/11/19   Pool Bradley MD   ipratropium-albuterol (DUONEB) 0.5-2.5 (3) MG/3ML SOLN nebulizer solution Inhale 3 mLs into the lungs every 4 hours as needed for Shortness of Breath 1/11/19   Pool Bradley MD   mometasone-formoterol [facility]) 100-5 MCG/ACT inhaler Inhale 2 puffs into the lungs 2 times daily 12/5/18   Maranda Marquis MD   pantoprazole (PROTONIX) 40 MG tablet Take 1 tablet by mouth 2 times daily (before meals) 11/13/18   Jeb Beatty MD   folic acid (FOLVITE) 1 MG tablet Take 1 tablet by mouth daily 10/13/18   Kelly Goodrich MD   vitamin B-1 100 MG tablet Take 1 tablet by mouth daily 10/13/18   Kelly Goodrich MD   vitamin B-12 250 MCG tablet Take 1 tablet by mouth daily 10/13/18   Kelly Goodrich MD   gabapentin (NEURONTIN) 300 MG capsule Take 300 mg by mouth 3 times daily. Jose Dye MD   Multiple Vitamins-Minerals (THERAPEUTIC MULTIVITAMIN-MINERALS) tablet Take 1 tablet by mouth daily 8/12/18   Yee Fan MD   .  Recent Surgical History: None = 0     Assessment  Aerosols as needed at home .  advair bid        RR 16  Breath Sounds: clear, diminished      · Bronchodilator assessment at level  2  · Hyperinflation assessment at level   · Secretion Management assessment at level    ·   · []    Bronchodilator Assessment  BRONCHODILATOR ASSESSMENT SCORE  Score 0 1 2 3 4 5   Breath Sounds   []  Patient Baseline []  No Wheeze good aeration [x]  Faint, scattered wheezing, good aeration []  Expiratory Wheezing and or moderately diminished []  Insp/Exp wheeze and/or very diminished []  Insp/Exp and/ or marked distress   Respiratory Rate   []  Patient Baseline [x]  Less than 20 [x]  Less than 20 []  20-25 []  Greater than 25 [phone]

## 2020-07-31 NOTE — PROGRESS NOTES
Patient came down for testing and asked to be returned to room, unable to complete testing at this time.

## 2020-07-31 NOTE — PROGRESS NOTES
Pt was suppose to have a APTT drawn at 0215. phlebotomoist was contacted around 230. Ended up being drawn around 240- specimen was clotted off but I was never informed. Called phlebotomist several times, no answer. Ended up getting a hold of someone around 430 they stated they were sending someone up, waited about 30 minutes and called back. Phlebotomist came from [facility] to draw and was unable to get a specimen due to poor venous access. Respiratory contacted to do an arterial draw.

## 2020-07-31 NOTE — PROGRESS NOTES
Pharmacy Note  Vancomycin Consult    Emi Nunes is a 77 y.o. female started on Vancomycin for COPD exacerbation/pneumonia; consult received from Dr. Doris Beth to manage therapy. Also receiving the following antibiotics: azithromycin.     Patient Active Problem List   Diagnosis    Essential hypertension    Hepatitis C    History of substance abuse ([facility] [address]    Chronic heart failure with reduced ejection fraction and diastolic dysfunction (HCC)    COPD (chronic obstructive pulmonary disease) (HCC)    Alcoholic cirrhosis of liver without ascites (HCC)    PUD (peptic ulcer disease)    Anemia    Duodenal ulcer with hemorrhage    Psychosis, intermittent requring antipsychotic meds    Opiate withdrawal ([address]    Pneumonia    Pancreas head lesion    Polysubstance abuse ([address]    Cerebrovascular accident (CVA) ([address]    Avascular necrosis of bone of right hip (HCC)    Closed fracture of head of right femur ([address]    COPD without exacerbation ([address]    History of hepatitis C    Thrombocytosis (HCC)    Avascular necrosis ([address]    History of total right hip replacement    Severe malnutrition (HCC)    COPD exacerbation (HCC)    Chronic diastolic congestive heart failure ([address]    Other dysphagia    Right sided weakness    Chronic daily headache    Memory changes    Generalized weakness    Stroke with cerebral ischemia (HCC)    SOB (shortness of breath)    Avascular necrosis of left femur ([address]    Pulmonary embolism on right (HCC)       Allergies:  Aspirin and Keflet [cephalexin]     Temp max: 99.1    Recent Labs     07/30/20  1619 07/31/20  0340   BUN 26* 20       Recent Labs     07/30/20  1619 07/31/20  0340   CREATININE 1.37* 0.80       Recent Labs     07/30/20  1619 07/31/20  0340   WBC 12.5* 11.9*         Intake/Output Summary (Last 24 hours) at 7/31/2020 0941  Last data filed at 7/31/2020 0514  Gross per 24 hour   Intake 1456.4 ml   Output --   Net 1456.4 ml       Culture Date      Source Results  7/30/20                 blood                           pending  7/31/20                 nasal MRSA                ordered    Ht Readings from Last 1 Encounters:   07/30/20 4' 11\" (1.499 m)        Wt Readings from Last 1 Encounters:   07/31/20 97 lb 14.2 oz (44.4 kg)         Body mass index is 19.77 kg/m². CrCl cannot be calculated (Unknown ideal weight.). Goal Trough Level: 15-20 mcg/mL    Assessment/Plan:  Will initiate Vancomycin with a one time loading dose of 750 mg x1, followed by 500 mg IV every 12 hours. Timing of trough level will be determined based on culture results, renal function, and clinical response. Thank you for the consult. Will continue to follow. [id number] SARAH DENNIS, [facility]  7/31/2020  9:43 AM

## 2020-07-31 NOTE — PLAN OF CARE
Problem: Skin Integrity:  Goal: Absence of new skin breakdown  Description: Absence of new skin breakdown  Outcome: Met This Shift     Problem: Falls - Risk of:  Goal: Will remain free from falls  Description: Will remain free from falls  Outcome: Met This Shift     Problem: Falls - Risk of:  Goal: Absence of physical injury  Description: Absence of physical injury  Outcome: Met This Shift     Problem: Pain:  Goal: Pain level will decrease  Description: Pain level will decrease  Outcome: Ongoing     Problem: Pain:  Goal: Control of acute pain  Description: Control of acute pain  Outcome: Ongoing     Problem: Pain:  Goal: Control of chronic pain  Description: Control of chronic pain  Outcome: Ongoing

## 2020-07-31 NOTE — PROGRESS NOTES
Patient has been non compliant all day, she is accepting of some care however has refused her Echo, refused a bladder scan, refuses to get washed up. She also refuses for the nurse to assist to reposition. She has kick the writing nurse out of her room multiple time throughout the day.

## 2020-07-31 NOTE — CARE COORDINATION
Case Management Initial Discharge Plan  Emi Daniels,             Met with:patient to discuss discharge plans. Information verified: address, contacts, phone number, , insurance Yes    Emergency Contact/Next of Kin name & number: Yaima Rowe (sister) [phone]    PCP: No primary care provider on file. Date of last visit: \"Lou\" at [facility]? Insurance Provider: [employer]    Discharge Planning    Living Arrangements:  Alone   Support Systems:  Family Members    Home has 1 stories  0 stairs to climb to get into front door, n/a stairs to climb to reach second floor  Location of bedroom/bathroom in home main level    Patient able to perform ADL's:Independent    Current Services (outpatient & in home) DME  DME equipment:   DME provider:      Receiving oral anticoagulation therapy? Yes    If indicated:   Physician managing anticoagulation treatment: n/a  Where does patient obtain lab work for ATC treatment? n/a      Potential Assistance Needed:  Home Care    Patient agreeable to home care: TBD  [place] of choice provided:  yes    Prior SNF/Rehab Placement and Facility:    Agreeable to SNF/Rehab: No  [place] of choice provided: n/a     Evaluation: n/a    Expected Discharge date:  20    Patient expects to be discharged to:  home  Follow Up Appointment: Best Day/ Time:      Transportation provider: alex  Transportation arrangements needed for discharge: Yes     Readmission Risk              Risk of Unplanned Readmission:        52             Does patient have a readmission risk score greater than 14?: Yes  If yes, follow-up appointment must be made within 7 days of discharge. Goals of Care: sleep      Discharge Plan: Home independently vs [facility].  Many ER visits - referred to Shelia Bonilla  supposed to be working on electric scooter.       [facility] pharmacy: [facility]      Electronically signed by Chaim Sarmiento RN on 20 at 4:23 PM EDT

## 2020-07-31 NOTE — PROGRESS NOTES
[facility]  Internal Medicine Teaching Residency Program  Inpatient Daily Progress Note  ______________________________________________________________________________    Patient: Darin Wooten  YOB: 1955   BZM:[id number]    Acct: [de-identified]     Room: [address] [place] [address]  Admit date: 7/30/2020  Today's date: 07/31/20  Number of days in the hospital: 1    SUBJECTIVE   Admitting Diagnosis: Pulmonary embolism on right [facility])  CC: Headache, cough, shortness of breath  Pt examined at bedside. Chart & results reviewed. She is more alert today. Saturating 99% on 3L o2 nasal canula. Has suprapubic tenderness>will get post void bladder scan. On heparin drip fir segmental PE, and antibiotics for pneumonia  Vitals:stable  Labs: Blood cultures positive for gram positive cocci in clusters from both sites>will change rocephin to vancomycin    Review of Systems   Constitutional: Negative for fever. Respiratory: Positive for cough and shortness of breath. Cardiovascular: Negative for chest pain and leg swelling. Gastrointestinal: Positive for abdominal pain. Genitourinary: Positive for frequency. Neurological: Negative for dizziness. Psychiatric/Behavioral: Negative for confusion. BRIEF HISTORY     The patient is a pleasant 77 y.o. female with PMH of COPD, migraine, h/o CVA, chronic diastolic congestive heart failure, right hip replacement s/p avascular necrosis presents with a chief complaint of headache, cough and shortness of breath. She visited ED twice in the past week for the headache and was told that this is due to migraine and was asked to to follow-up outpatient. Her headache started again today and is worsening with retro-orbital pain. She was desaturating on presentation to the low 90s and has cough with SOB. She reports that this cough and SOB is there for a while now.   She was drowsy likely due to Benadryl and Compazine and was unable to provide proper history.     In the ER, she was hypoxic and requiring 3 L oxygen through nasal cannula. She was given Benadryl and compazine for headache. proBNP 1101, troponin 19, bicarb 16, anion gap 18, WBC 12.5. CXR shows left hilar masslike opacity with worsening atelectasis involving the left lower lobe. CT chest was recommended and it shows few scattered small segmental, subsegmental PE involving right lower lobe. Masslike opacity involving left lower lobe favor pneumonia. She was given a dose of azithromycin 500mg, dose of heparin and a 500 mL bolus of normal saline.      Past medical history significant for:     COPD> chronic smoker, currently 1 pack/day for 30 years. Currently on DuoNeb, albuterol, Dulera. PFTs 8/2018>restrictive. FEV1 at 53% predicted, FVC 55% predicted.  No bronchodilator change. FEV1/FVC ratio is 76.  VVG19-97 is 40% predicted. she was advised complete smoking cessation, oxygen use 24 hours a day at 2 L, get PFTs, and to get yearly CT scan screening. But she is noncompliant follow-up.     Diastolic heart failure> 08/22 ECHO> hyperdynamic EF 69%, mild grade 1 diastolic dysfunction, mild LVH-basal septal hypertrophy.     History of hepatitis C in the past with GI bleed> EGD on 11/05/2018 for melena>1 CM ulcer in duodenum at bulb/D2 with bleeding and pulsating visible vessel - tx with epi and endo clips. Currently on pantoprazole 40 mg twice daily       RIGHT HIP TOTAL ARTHROPLASTY 8/2019>s/p avascular necrosis of replaced hip. In the past weeks, she visited ED multiple times for hip pain with 1 admission and was advised to follow-up outpatient with orthopedics but she did not.     1/2020 Left posterior frontal lobe infarct on MRI> currently on Plavix 75 mg and Lipitor. Cannot tolerate aspirin.     OBJECTIVE     Vital Signs:  /88   Pulse 101   Temp 98.6 °F (37 °C) (Oral)   Resp 14   Ht 4' 11\" (1.499 m)   Wt 102 lb (46.3 kg)   LMP 03/18/2015   SpO2 97%   BMI 20.60 kg/m² Temp (24hrs), Av.4 °F (36.9 °C), Min:98.2 °F (36.8 °C), Max:98.6 °F (37 °C)    In: 500   Out: -     Physical Exam:  Constitutional: This is a well developed, well nourished, 18.5-24.9 - Normal 77y.o. year old female who is alert, oriented, cooperative and in no apparent distress. Head:normocephalic and atraumatic. EENT:  PERRLA. No conjunctival injections. Septum was midline, mucosa was without erythema, exudates or cobblestoning. No thrush was noted. Neck: Supple without thyromegaly. No elevated JVP. Trachea was midline. Respiratory: Chest was symmetrical without dullness to percussion. Breath sounds bilaterally were clear to auscultation. There were no wheezes, rhonchi or rales. There is no intercostal retraction or use of accessory muscles. No egophony noted. Cardiovascular: Regular without murmur, clicks, gallops or rubs. Abdomen: Slightly rounded and soft without organomegaly. No rebound, rigidity or guarding was appreciated. Lymphatic: No lymphadenopathy. Musculoskeletal: Normal curvature of the spine. No gross muscle weakness. Extremities:  No lower extremity edema, ulcerations, tenderness, varicosities or erythema. Muscle size, tone and strength are normal.  No involuntary movements are noted. Skin:  Warm and dry. Good color, turgor and pigmentation. No lesions or scars.   No cyanosis or clubbing  Neurological/Psychiatric: The patient's general behavior, level of consciousness, thought content and emotional status is normal.        Medications:  Scheduled Medications:    sodium chloride flush  10 mL Intravenous 2 times per day    atorvastatin  40 mg Oral Nightly    carvedilol  6.25 mg Oral BID WC    clopidogrel  75 mg Oral Daily    pantoprazole  40 mg Oral BID AC    thiamine  100 mg Oral Daily    ipratropium-albuterol  1 ampule Inhalation Q4H WA    sodium chloride flush  10 mL Intravenous 2 times per day    cefTRIAXone (ROCEPHIN) IV  1 g Intravenous Q24H    azithromycin  500 mg Intravenous Q24H    nicotine  1 patch Transdermal Daily    therapeutic multivitamin-minerals  1 tablet Oral Daily    budesonide-formoterol  2 puff Inhalation BID     Continuous Infusions:    heparin (porcine) 18 Units/kg/hr (07/30/20 2114)    sodium chloride 75 mL/hr at 07/30/20 2114     PRN Medicationsheparin (porcine), 80 Units/kg, PRN  heparin (porcine), 40 Units/kg, PRN  sodium chloride flush, 10 mL, PRN  acetaminophen, 650 mg, Q4H PRN  albuterol, 2.5 mg, As Directed RT PRN  benzonatate, 100 mg, TID PRN  butalbital-acetaminophen-caffeine, 1 tablet, Q4H PRN  melatonin, 1 mg, Nightly PRN  sodium chloride flush, 10 mL, PRN  acetaminophen, 650 mg, Q6H PRN    Or  acetaminophen, 650 mg, Q6H PRN  polyethylene glycol, 17 g, Daily PRN  promethazine, 12.5 mg, Q6H PRN    Or  ondansetron, 4 mg, Q6H PRN  potassium chloride, 40 mEq, PRN    Or  potassium alternative oral replacement, 40 mEq, PRN    Or  potassium chloride, 10 mEq, PRN        Diagnostic Labs:  CBC:   Recent Labs     07/30/20  1619   WBC 12.5*   RBC 4.26   HGB 13.0   HCT 42.9   .7   RDW 17.0*   *     BMP:   Recent Labs     07/30/20  1619      K 4.0      CO2 16*   BUN 26*   CREATININE 1.37*     BNP: No results for input(s): BNP in the last 72 hours. PT/INR:   Recent Labs     07/30/20 2016   PROTIME 10.2   INR 1.0     APTT:   Recent Labs     07/30/20 2016   APTT 23.7     CARDIAC ENZYMES: No results for input(s): CKMB, CKMBINDEX, TROPONINI in the last 72 hours.     Invalid input(s): CKTOTAL;3  FASTING LIPID PANEL:  Lab Results   Component Value Date    CHOL 182 01/27/2020    HDL 77 01/27/2020    TRIG 121 01/27/2020     LIVER PROFILE:   Recent Labs     07/30/20  1619   AST 19   ALT 8   BILITOT 0.38   ALKPHOS 71      MICROBIOLOGY:   Lab Results   Component Value Date/Time    CULTURE NO SIGNIFICANT GROWTH 01/27/2020 12:09 AM       Imaging:    Xr Chest (2 Vw)    Result Date: 7/24/2020  Interval development of left lower lobe parenchymal consolidation which could reflect pneumonia, atelectasis or combination thereof. Correlate clinically and follow-up chest radiographs in 6-8 weeks would be recommended after appropriate therapy has been administered to ensure resolution. There is additional lingular left lower lobe subsegmental atelectasis. Xr Chest Portable    Result Date: 7/30/2020  Left hilar masslike opacity with worsening atelectasis involving the left lower lobe compared to the prior study from July 27th. Malignancy is suspected with possible developing compression involving the left lower lobe bronchus. Developing superimposed pneumonia cannot be excluded. Further evaluation with CT is recommended. Xr Chest Portable    Result Date: 7/27/2020  Masslike left hilar opacity for which contrast-enhanced chest CT is recommended to evaluate the possibility of a neoplasm versus masslike consolidation. Ct Chest Pulmonary Embolism W Contrast    Result Date: 7/30/2020  Few scattered small segmental subsegmental PE identified involving right lower lobe. Masslike opacity involving left lower lobe favor pneumonia. Follow-up to assure resolution following medical treatment course recommended. Critical results were called by Dr. Perico Cortez to [facility] on 7/30/2020 at 18:45. ASSESSMENT & PLAN   Principal Problem:    Pulmonary embolism on right [facility])  Active Problems:    Essential hypertension    COPD (chronic obstructive pulmonary disease) (HCC)    Pneumonia  Resolved Problems:    * No resolved hospital problems. *      IMPRESSION  This is a 77 y.o. female who presented with headache, cough and shortness of breath. The CT chest shows right lower lobe subsegmental PE and masslike opacity involving the left lower lobe favoring pneumonia but could also be malignancy.        Left lower lobe pneumonia vs malignancy. Cough with shortness of breath. CXR> left hilar masslike opacity.   CT chest>few scattered small segmental, subsegmental PE involving right lower lobe & Masslike opacity involving left lower lobe favor pneumonia. Blood cultures positive for gram positive cocci in clusters from both sites>will change rocephin to vancomycin. Continue Gentle hydration at 75 mL/h. Mass could be malignancy>May need to repeat CT chest 4-6 weeks after resolution of pneumonia to see if there is any underlying mass. will work up for any colonic malignancy as outpatient. Acute on chronic hypoxic respiratory failure likely sec to Pneumonia on top of COPD.      Segmental, subsegmental lower lobe pulmonary embolism>CT chest>few scattered small segmental, subsegmental PE involving right lower lobe>PE could be due to malignancy vs immobility. On heparin drip. Consulting pulmonology  Will likely need to go on eliquis or xeralto for few months     Chronic diastolic heart EUDRAXS>41/42 ECHO> hyperdynamic EF 69%, mild grade 1 diastolic dysfunction, mild LVH-basal septal hypertrophy. proBNP 1101. currently not on any medication. Will get echo tomorrow morning     Suprapubic tenderness likely due to UTI> will get UA reflex to culture.     History of drug abuse> will get U tox     Headache> likely due to migraine. Tylenol as needed     COPD> stable. Resume DuoNeb, albuterol, Symbicort and oxygen titration for goal SaO2 88-92%     History of hepatitis C in the past with GI bleed> EGD on 11/05/2018 for melena>1 CM ulcer in duodenum at bulb/D2 with bleeding and pulsating visible vessel - tx with epi and endo clips. Continue Protonix 40 mg twice daily        DVT ppx: Heparin drip  GI ppx: Protonix     PT/OT/SW: Ongoing  Discharge Planning: Ongoing  Philipp Stephen MD  Internal Medicine Resident, PGY-1  [address], [place]  7/31/2020, 1:21 AM  Attending Physician Statement  I have discussed the care of Emi Daniels and I have examined the patient myselft and taken ros and hpi , including pertinent history and exam findings,  with the resident. I have reviewed the key elements of all parts of the encounter with the resident. I agree with the assessment, plan and orders as documented by the resident.       Electronically signed by Samy Prado MD

## 2020-07-31 NOTE — ED NOTES
Pharmacy called & heparin gtt to be send to floor, gtt not stocked in [facility] at this time     [facility]  07/30/20 2020

## 2020-07-31 NOTE — CONSULTS
Osteoarthritis     hands    Pneumonia     Scoliosis     Substance abuse ([facility] [address]     ivda and cocaine abuse in past     PAST SURGICAL HISTORY:        Procedure Laterality Date    APPENDECTOMY  2017     SECTION      GASTRIC BYPASS SURGERY      HIP SURGERY Right 2019    HIP TOTAL ARTHROPLASTY ANTERIOR APPROACH - [facility],  C-ARM, (Right )    LAPAROSCOPIC APPENDECTOMY N/A 3/27/2017    APPENDECTOMY LAPAROSCOPIC performed by Alisia Cosme MD at [address] [place] [address]  2002    hgsil    CT COLONOSCOPY W/BIOPSY SINGLE/MULTIPLE N/A 11/10/2018    COLONOSCOPY WITH BIOPSY performed by Janie Mi MD at [facility]    CT EGD TRANSORAL BIOPSY SINGLE/MULTIPLE N/A 2018    EGD ESOPHAGOGASTRODUODENOSCOPY performed by Tova Olguin MD at [address] Right 2019    HIP TOTAL ARTHROPLASTY ANTERIOR APPROACH - [facility],  C-ARM, performed by Ronel Sharpe DO at [facility]      oesophagitis, candidiasis    UPPER GASTROINTESTINAL ENDOSCOPY  2018    UPPER GASTROINTESTINAL ENDOSCOPY N/A 2018    EGD CONTROL HEMORRHAGE performed by Tova Olguin MD at [facility]:       Problem Relation Age of Onset    Breast Cancer Mother     Heart Disease Father      SOCIAL HISTORY:   TOBACCO:   reports that she quit smoking about 20 months ago. Her smoking use included cigarettes. She started smoking about 50 years ago. She has a 40.00 pack-year smoking history. She has never used smokeless tobacco.  ETOH:  reports no history of alcohol use. DRUGS: reports no history of drug use. ALLERGIES:    Allergies   Allergen Reactions    Aspirin Itching    Keflet [Cephalexin]      Tolerated penicillin V , tolerates cephalexin          HOME MEDICATIONS:  Prior to Admission medications    Medication Sig Start Date End Date Taking?  Authorizing Provider   apixaban (ELIQUIS) 5 MG TABS tablet Take 2 tablets by mouth 2 times daily for 7 days 7/31/20 8/7/20 Yes Xochilt Tirado MD   apixaban (ELIQUIS) 5 MG TABS tablet Take 1 tablet by mouth 2 times daily 8/7/20  Yes Xochilt Tirado MD   doxycycline monohydrate (ADOXA) 100 MG tablet Take 1 tablet by mouth 2 times daily for 10 days 7/24/20 8/3/20  Kylee Abdalla, DO   ibuprofen (ADVIL;MOTRIN) 600 MG tablet Take 1 tablet by mouth 4 times daily as needed for Pain 7/16/20 7/30/20  Kristel Mireles, DO   diclofenac (VOLTAREN) 50 MG EC tablet Take 1 tablet by mouth 2 times daily 4/6/20   [facility], DO   amitriptyline (ELAVIL) 10 MG tablet Take 1 tablet by mouth nightly 1/31/20   Gina Salvage, DO   atorvastatin (LIPITOR) 40 MG tablet Take 1 tablet by mouth nightly 1/31/20   Gina Salvage, DO   butalbital-acetaminophen-caffeine (FIORICET, ESGIC) -40 MG per tablet Take 1 tablet by mouth every 4 hours as needed for Headaches 1/31/20   Gina Salvage, DO   clopidogrel (PLAVIX) 75 MG tablet Take 1 tablet by mouth daily 2/1/20   Annel Andino, DO   albuterol sulfate HFA (PROVENTIL HFA) 108 (90 Base) MCG/ACT inhaler Inhale 1-2 puffs into the lungs once for 1 dose 1/9/20 6/15/20  Amy Alcocer MD   ondansetron (ZOFRAN ODT) 4 MG disintegrating tablet Take 1 tablet by mouth every 8 hours as needed for Nausea 9/9/19   Lisa Kimbrough PA-C   docusate sodium (COLACE, DULCOLAX) 100 MG CAPS Take 100 mg by mouth 2 times daily 8/22/19   Kimberly Martínez MD   [facility]  Devices [facility] [place]) MISC 1 Device by Does not apply route once for 1 dose 8/5/19 8/5/19  Lisa Kimbrough PA-C   hydrOXYzine (ATARAX) 25 MG tablet Take 1 tablet by mouth every 8 hours as needed for Itching 8/5/19   Lisa Kimbrough PA-C   benzonatate (TESSALON PERLES) 100 MG capsule Take 1 capsule by mouth 3 times daily as needed for Cough 3/11/19   Misbah Lopez DO   carvedilol (COREG) 6.25 MG tablet Take 1 tablet by mouth 2 times daily (with meals) 2/7/19   Chiara Roldan MD   ferrous sulfate (MELISSA-HAO) 325 (65 Fe) MG tablet Take 1 tablet by mouth daily 19   Zeferino Hernandez MD   melatonin 1 MG tablet Take 1 tablet by mouth nightly as needed for Sleep 19   Jb Hercules MD   ipratropium-albuterol (DUONEB) 0.5-2.5 (3) MG/3ML SOLN nebulizer solution Inhale 3 mLs into the lungs every 4 hours as needed for Shortness of Breath 19   Jb Hercules MD   mometasone-formoterol [facility]) 100-5 MCG/ACT inhaler Inhale 2 puffs into the lungs 2 times daily 18   Mevelyn Cranker, MD   pantoprazole (PROTONIX) 40 MG tablet Take 1 tablet by mouth 2 times daily (before meals) 18   Lucille Zarate MD   folic acid (FOLVITE) 1 MG tablet Take 1 tablet by mouth daily 10/13/18   Rissa Acosta MD   vitamin B-1 100 MG tablet Take 1 tablet by mouth daily 10/13/18   Rissa Acosta MD   vitamin B-12 250 MCG tablet Take 1 tablet by mouth daily 10/13/18   Rissa Acosta MD   gabapentin (NEURONTIN) 300 MG capsule Take 300 mg by mouth 3 times daily. Tania Jon Historical Provider, MD   Multiple Vitamins-Minerals (THERAPEUTIC MULTIVITAMIN-MINERALS) tablet Take 1 tablet by mouth daily 18   Nguyen Mckeon MD     IMMUNIZATIONS:  There is no immunization history for the selected administration types on file for this patient.     REVIEW OF SYSTEMS:  Unobtainable from patient due to sedation/mechanical ventilation    PHYSICAL EXAMINATION     VITAL SIGNS:   LAST-  /74   Pulse 97   Temp 99.1 °F (37.3 °C) (Temporal)   Resp 16   Ht 4' 11\" (1.499 m)   Wt 97 lb 14.2 oz (44.4 kg)   LMP 2015   SpO2 99%   BMI 19.77 kg/m²   8-24 HR RANGE-  TEMP Temp  Av.7 °F (37.1 °C)  Min: 98.2 °F (36.8 °C)  Max: 99.1 °F (89.7 °C)   BP Systolic (76ZBQ), GDJ:458 , Min:112 , DVM:414      Diastolic (40FAU), OML:20, Min:69, Max:95     PULSE Pulse  Av.3  Min: 86  Max: 111   RR Resp  Av.6  Min: 15  Max: 21   O2 SAT SpO2  Av.8 %  Min: 96 %  Max: 99 %   OXYGEN DELIVERY O2 Flow Rate (L/min)  Av.7 L/min  Min: 1 L/min  Max: 2 L/min     Ventilator Settings:  Vent Information  SpO2: 99 %    SYSTEMIC EXAMINATION:   General appearance - Mechanically ventilated, ill-appearing  Mental status - sedated  Eyes - pupils equal and reactive, sclera anicteric  Mouth - mucous membranes moist, pharynx normal without lesions  Neck - supple, no significant adenopathy, carotids upstroke normal bilaterally, no bruits  Chest - Breath sounds bilaterally were diminished to auscultation at bases. There were no wheezes, rhonchi or rales.   There is no intercostal recession or use of accessory muscles  Heart - normal rate, regular rhythm, normal S1, S2, no murmurs, rubs, clicks or gallops  Abdomen - soft, nontender, nondistended, no masses or organomegaly  Neurological - DTR's normal and symmetric, motor and sensory grossly normal bilaterally  Extremities - peripheral pulses normal, no pedal edema, no clubbing or cyanosis  Skin - normal coloration and turgor, no rashes, no suspicious skin lesions noted     DATA REVIEW     Medications: Current Inpatient  Scheduled Meds:   ipratropium-albuterol  1 ampule Inhalation TID    vancomycin (VANCOCIN) intermittent dosing (placeholder)   Other RX Placeholder    vancomycin  750 mg Intravenous Once    vancomycin  500 mg Intravenous Q12H    apixaban  10 mg Oral BID    Followed by   Christopher Deleon ON 8/7/2020] apixaban  5 mg Oral BID    sodium chloride flush  10 mL Intravenous 2 times per day    atorvastatin  40 mg Oral Nightly    carvedilol  6.25 mg Oral BID WC    clopidogrel  75 mg Oral Daily    pantoprazole  40 mg Oral BID AC    thiamine  100 mg Oral Daily    sodium chloride flush  10 mL Intravenous 2 times per day    azithromycin  500 mg Intravenous Q24H    nicotine  1 patch Transdermal Daily    therapeutic multivitamin-minerals  1 tablet Oral Daily    budesonide-formoterol  2 puff Inhalation BID     Continuous Infusions:   dextrose      sodium chloride 75 mL/hr at 07/30/20 2114     INPUT/OUTPUT:  In: 1656.4 [P.O.:420; I.V.:686.4]  Out: -   Date 07/31/20 0000 - 07/31/20 2359   Shift 5154-4524 4341-7902 5161-2749 24 Hour Total   INTAKE   P.O.(mL/kg/hr) 220(0.6) 200  420   I. V.(mL/kg) 686. 4(15.5)   686. 4(15.5)   IV Piggyback(mL/kg) 50(1.1)   50(1.1)   Shift Total(mL/kg) 956. 4(21.5) 200(4.5)  1156.4(26)   OUTPUT   Shift Total(mL/kg)       Weight (kg) 44.4 44.4 44.4 44.4       LABS:-  ABG:   No results for input(s): POCPH, POCPCO2, POCPO2, POCHCO3, DLWS8SLE in the last 72 hours. CBC:   Recent Labs     07/30/20  1619 07/31/20  0340   WBC 12.5* 11.9*   HGB 13.0 11.8*   HCT 42.9 39.8   .7 104.7*   * 734*   LYMPHOPCT 17* 19*   RBC 4.26 3.80*   MCH 30.5 31.1   MCHC 30.3 29.6   RDW 17.0* 17.2*     BMP:   Recent Labs     07/30/20  1619 07/31/20  0340    139   K 4.0 4.1    107   CO2 16* 12*   BUN 26* 20   CREATININE 1.37* 0.80   GLUCOSE 94 48*     Liver Function Test:   Recent Labs     07/30/20  1619   PROT 8.4*   LABALBU 3.5   ALT 8   AST 19   ALKPHOS 71   BILITOT 0.38     Amylase/Lipase:  No results for input(s): AMYLASE, LIPASE in the last 72 hours. Coagulation Profile:   Recent Labs     07/30/20 2016 07/31/20  0616   INR 1.0  --    PROTIME 10.2  --    APTT 23.7 >120.0*     Cardiac Enzymes:  No results for input(s): CKTOTAL, CKMB, CKMBINDEX, TROPONINI in the last 72 hours.   Lactic Acid:  Lab Results   Component Value Date    LACTA NOT REPORTED 08/12/2018    LACTA NOT REPORTED 08/11/2018     BNP:   No results found for: BNP  D-Dimer:  Lab Results   Component Value Date    DDIMER 1.90 07/19/2020     Others:   Lab Results   Component Value Date    TSH 1.56 02/25/2020    Q0ASASX 126 10/10/2018    J0ZEQAD 7.4 10/10/2018     Lab Results   Component Value Date    BARBARA NEGATIVE 02/12/2018    SEDRATE 18 01/30/2020    CRP 4.1 11/13/2019     Lab Results   Component Value Date    URICACID 3.5 11/16/2015     Lab Results   Component Value Date    IRON 40 01/27/2020    TIBC 456 (H) 01/27/2020    FERRITIN 69 09/22/2019     No results found for: SPEP, UPEP  Lab Results   Component Value Date     4 11/14/2011     Microbiology:    Pathology:    Radiology Reports:  CT CHEST PULMONARY EMBOLISM W CONTRAST   Final Result   Few scattered small segmental subsegmental PE identified involving right   lower lobe. Masslike opacity involving left lower lobe favor pneumonia. Follow-up to   assure resolution following medical treatment course recommended. Critical results were called by Dr. Paige Keyes to [facility] on   7/30/2020 at 18:45. XR CHEST PORTABLE   Final Result   Left hilar masslike opacity with worsening atelectasis involving the left   lower lobe compared to the prior study from July 27th. Malignancy is   suspected with possible developing compression involving the left lower lobe   bronchus. Developing superimposed pneumonia cannot be excluded. Further   evaluation with CT is recommended.          VL DUP LOWER EXTREMITY VENOUS BILATERAL    (Results Pending)       Pulmonary Function test:    Polysomnogram:    Echocardiogram:   Results for orders placed during the hospital encounter of 11/13/19   ECHO Complete 2D W Doppler W Color    Narrative Transthoracic Echocardiography Report (TTE)     Patient Name MATT CHAUDHRY  Date of Study             11/14/2019      Date of      1955      Gender                    Female   Birth      Age          75 year(s)      Race                      Black      Room Number  1006            Height:                   60 inch, 152.4 cm      Corporate ID [id number]        Weight:                   100 pounds, 45.4   #                                                      kg      Patient Acct [de-identified]       BSA:         1.39 m^2     BMI:     19.53   #                                                               kg/m^2      MR #         [id number]         [address]      Accession #  [id number]       Interpreting Physician [id number]      Fellow                       Referring Nurse                                Practitioner      Interpreting Phuong Reynosopeterson  Referring Physician       Maria Luisa Modi MD   Fellow     Type of Study      TTE procedure:2D Echocardiogram, M-Mode, Doppler, Color Doppler. Procedure Date  Date: 11/14/2019 Start: 09:29 AM    Study Location: [facility]    History / [place]. Comments:  Procedure explained to patient. ELEVATED BNP    Patient Status: Inpatient    Height: 60 inches Weight: 100 pounds BSA: 1.39 m^2 BMI: 19.53 kg/m^2    CONCLUSIONS    Summary  Small LV cavity. Hyperdynamic left ventricular function. Calculated EF via heart model method  is 69 %. Moderate left ventricular hypertrophy. Basal septal hypertrophy (\"sigmoid  septum\"). Grade I (mild) left ventricular diastolic dysfunction. Left atrium is mildly dilated. Mild tricuspid regurgitation. Estimated right ventricular systolic pressure  is 35 mmHg. Signature  ----------------------------------------------------------------------------   Electronically signed by Fabian Maier on 11/14/2019 10:11   AM  ----------------------------------------------------------------------------    ----------------------------------------------------------------------------   Electronically signed by Jesus Christian(Interpreting physician) on 11/14/2019   10:14 AM  ----------------------------------------------------------------------------  FINDINGS  Left Atrium  Left atrium is mildly dilated. Left Ventricle  Small LV cavity. Moderate left ventricular hypertrophy. Basal septal hypertrophy (\"sigmoid septum\"). Hyperdynamic left ventricular function. Calculated EF via heart model method is 69 %. Grade I (mild) left ventricular diastolic dysfunction. Right Atrium  Right atrium is normal in size. Right Ventricle  Normal right ventricular size and function. Mitral Valve  Normal mitral valve structure and function.   No evidence of mitral stenosis or mitral regurgitation. Aortic Valve  Normal aortic valve structure and function without stenosis or  regurgitation. Aortic valve is trileaflet. Tricuspid Valve  Normal tricuspid valve structure and function. Mild tricuspid regurgitation. Estimated right ventricular systolic pressure is 35 mmHg. Pulmonic Valve  Mild to moderate pulmonic insufficiency. Pericardial Effusion  No significant pericardial effusion is seen. Anterior echo free space suggestive of fat pad. Miscellaneous  Normal aortic root dimension. IVC normal diameter & inspiratory collapse indicating normal RA filling  pressure .   E/E' = 13 .    M-mode / 2D Measurements & Calculations:      LVIDd:3 cm(3.7 - 5.6 cm)         Diastolic BMZQYS:51 ml   GIVMR:4.0 cm(2.2 - 4.0 cm)       Systolic MCNJHL:71 ml   CSTY:6.0 cm(0.6 - 1.1 cm)        Aortic Root:2.9 cm(2.0 - 3.7 cm)   LVPWd:1.3 cm(0.6 - 1.1 cm)       LA Dimension: 4.1 cm(1.9 - 4.0 cm)   Fractional Shortenin.67 %   Calculated LVEF (%): 68.35 %     LVOT:2 cm      Mitral:                                 Aortic      Valve Area (P1/2-Time): 3.79 cm^2       Peak Velocity: 1.55 m/s   Peak E-Wave: 0.77 m/s                   Mean Velocity: 1.19 m/s   Peak A-Wave: 1.03 m/s                   Peak Gradient: 9.61 mmHg   E/A Ratio: 0.74                         Mean Gradient: 6 mmHg   Peak Gradient: 2.35 mmHg   Mean Gradient: 2 mmHg   Deceleration Time: 197 msec             Area (continuity): 2.43 cm^2   P1/2t: 58 msec                          AV VTI: 35.8 cm      Area (continuity): 3.58 cm^2   Mean Velocity: 0.67 m/s      Tricuspid:                              Pulmonic:      Estimated RVSP: 35 mmHg                 Peak Velocity: 1.05 m/s   Peak TR Velocity: 2.84 m/s              Peak Gradient: 4.41 mmHg   Peak TR Gradient: 32.2624 mmHg   Estimated RA Pressure: 3 mmHg           NC ED Velocity: 1.03 m/s                                              Estimated PASP: 35.26 mmHg Diastology / Tissue Doppler  Septal Wall E' velocity:0.05 m/s  Septal Wall E/E':14.1  Lateral Wall E' velocity:0.06 m/s  Lateral Wall E/E':12.1       Cardiac Catheterization:   No results found for this or any previous visit. ASSESSMENT AND PLAN     Assessment:    // Pneumonia  // Pulmonary embolism on right  // COPD  // Chronic diastolic CHF    Patient presenting with shortness of breath and hypoxia found to have subsegmental RLL PE with LLL pneumonia. Plan:  Currently on Eliquis 10 mg twice daily for 7 days and continue with 5 mg twice daily thereafter  Regarding pneumonia, okay to continue on vancomycin, recommend switching azithromycin to Zosyn  Continue Symbicort and DuoNeb, patient is breathing well on room air and saturating well            I personally interviewed/examined the patient; reviewed interval history, interpreted all available radiographic and laboratory data at the time of service. Jeannie Jacobs M.D. Pulmonary and Critical Care Medicine           7/31/2020, 11:54 AM    Please note that this chart was generated using voice recognition Dragon dictation software. Although every effort was made to ensure the accuracy of this automated transcription, some errors in transcription may have occurred.

## 2020-07-31 NOTE — H&P
bronchodilator change. FEV1/FVC ratio is 76.  NYT87-64 is 40% predicted. she was advised complete smoking cessation, oxygen use 24 hours a day at 2 L, get PFTs, and to get yearly CT scan screening. But she is noncompliant follow-up. Diastolic heart failure> 68/30 ECHO> hyperdynamic EF 69%, mild grade 1 diastolic dysfunction, mild LVH-basal septal hypertrophy. History of hepatitis C in the past with GI bleed> EGD on 11/05/2018 for melena>1 CM ulcer in duodenum at bulb/D2 with bleeding and pulsating visible vessel - tx with epi and endo clips. Currently on pantoprazole 40 mg twice daily      RIGHT HIP TOTAL ARTHROPLASTY 8/2019>s/p avascular necrosis of replaced hip. In the past weeks, she visited ED multiple times for hip pain with 1 admission and was advised to follow-up outpatient with orthopedics but she did not.    1/2020 Left posterior frontal lobe infarct on MRI> currently on Plavix 75 mg and Lipitor. Cannot tolerate aspirin. Review of Systems   Constitutional: Positive for chills and fatigue. Negative for fever. HENT: Positive for sore throat. Respiratory: Positive for cough and shortness of breath. Cardiovascular: Negative for chest pain, palpitations and leg swelling. Gastrointestinal: Positive for abdominal pain (suprapubic). Negative for vomiting. Genitourinary: Negative for dysuria. Musculoskeletal: Negative for myalgias. Neurological: Positive for weakness. Psychiatric/Behavioral: Positive for decreased concentration.          PAST MEDICAL HISTORY     Past Medical History:   Diagnosis Date    Appendicitis 3/27/2017    Cerebral artery occlusion with cerebral infarction [facility])     CHF (congestive heart failure) (HCC)     Chronic respiratory failure with hypoxia and hypercapnia (HCC)     Chronic rhinitis     Cigarette smoker     COPD (chronic obstructive pulmonary disease) ([facility] [address] 8/5/2018    Depression     Dysphagia     Essential hypertension 8/11/2018    GERD (gastroesophageal reflux disease)     Heart failure, diastolic, with acute decompensation ([facility] [address] 2018    Hepatitis C, chronic (HCC)     History of smoking at least 1 pack per day for at least 30 years     Hyperlipidemia     Hypertension     Migraine     Moderate COPD (chronic obstructive pulmonary disease) (HCC)     Multiple transfusions     Neurogenic dysphagia     Osteoarthritis     hands    Pneumonia     Scoliosis     Substance abuse ([facility] [address]     ivda and cocaine abuse in past       PAST SURGICAL HISTORY     Past Surgical History:   Procedure Laterality Date    APPENDECTOMY  2017     SECTION      GASTRIC BYPASS SURGERY      HIP SURGERY Right 2019    HIP TOTAL ARTHROPLASTY ANTERIOR APPROACH - [facility],  C-ARM, (Right )    LAPAROSCOPIC APPENDECTOMY N/A 3/27/2017    APPENDECTOMY LAPAROSCOPIC performed by Lyric Pinzon MD at [address] [facility]      hgsil    [place] COLONOSCOPY W/BIOPSY SINGLE/MULTIPLE N/A 11/10/2018    COLONOSCOPY WITH BIOPSY performed by Mariana Reynolds MD at [facility]    OH EGD TRANSORAL BIOPSY SINGLE/MULTIPLE N/A 2018    EGD ESOPHAGOGASTRODUODENOSCOPY performed by Katerine Barrett MD at [address] Right 2019    HIP TOTAL ARTHROPLASTY ANTERIOR APPROACH - [facility],  C-ARM, performed by Reynold Poole DO at [address]      oesophagitis, candidiasis    UPPER GASTROINTESTINAL ENDOSCOPY  2018    UPPER GASTROINTESTINAL ENDOSCOPY N/A 2018    EGD CONTROL HEMORRHAGE performed by Katerine Barrett MD at [address]     Aspirin and Keflet [cephalexin]    MEDICATIONS PRIOR TO ADMISSION     Prior to Admission medications    Medication Sig Start Date End Date Taking?  Authorizing Provider   doxycycline monohydrate (ADOXA) 100 MG tablet Take 1 tablet by mouth 2 times daily for 10 days 7/24/20 8/3/20  Melo Sage DO   docusate sodium (COLACE) 100 MG capsule Take 1 capsule by mouth 2 times daily for 14 days 7/16/20 7/30/20  Yessenia Damon DO   ibuprofen (ADVIL;MOTRIN) 600 MG tablet Take 1 tablet by mouth 4 times daily as needed for Pain 7/16/20 7/30/20  Yessenia Damon DO   senna (SENOKOT) 8.6 MG TABS tablet Take 1 tablet by mouth daily for 14 days 7/16/20 7/30/20  Yessenia Damon DO   diclofenac (VOLTAREN) 50 MG EC tablet Take 1 tablet by mouth 2 times daily 4/6/20   Alondra Davison DO   amitriptyline (ELAVIL) 10 MG tablet Take 1 tablet by mouth nightly 1/31/20   Drew Stokes DO   atorvastatin (LIPITOR) 40 MG tablet Take 1 tablet by mouth nightly 1/31/20   Drew Stokes DO   butalbital-acetaminophen-caffeine (FIORICET, ESGIC) -40 MG per tablet Take 1 tablet by mouth every 4 hours as needed for Headaches 1/31/20   Drew Stokes DO   clopidogrel (PLAVIX) 75 MG tablet Take 1 tablet by mouth daily 2/1/20   Annel Andino DO   albuterol sulfate HFA (PROVENTIL HFA) 108 (90 Base) MCG/ACT inhaler Inhale 1-2 puffs into the lungs once for 1 dose 1/9/20 6/15/20  Ron Spangler MD   ondansetron (ZOFRAN ODT) 4 MG disintegrating tablet Take 1 tablet by mouth every 8 hours as needed for Nausea 9/9/19   Liss Smith PA-C   docusate sodium (COLACE, DULCOLAX) 100 MG CAPS Take 100 mg by mouth 2 times daily 8/22/19   Kuldeep Joe MD   [facility]  Devices [facility]) MISC 1 Device by Does not apply route once for 1 dose 8/5/19 8/5/19  Liss Smith PA-C   hydrOXYzine (ATARAX) 25 MG tablet Take 1 tablet by mouth every 8 hours as needed for Itching 8/5/19   Liss Smith PA-C   benzonatate (TESSALON PERLES) 100 MG capsule Take 1 capsule by mouth 3 times daily as needed for Cough 3/11/19   Gregg Lopez DO   carvedilol (COREG) 6.25 MG tablet Take 1 tablet by mouth 2 times daily (with meals) 2/7/19   Christy Martinez MD   ferrous sulfate (MELISSA-HAO) 325 (65 Fe) MG tablet Take 1 tablet by mouth daily 2/7/19   Christy Martinez MD   melatonin 1 MG tablet Take 1 tablet by mouth nightly as needed for Sleep 19   Melany Stuart MD   ipratropium-albuterol (DUONEB) 0.5-2.5 (3) MG/3ML SOLN nebulizer solution Inhale 3 mLs into the lungs every 4 hours as needed for Shortness of Breath 19   Melany Stuart MD   mometasone-formoterol [facility]) 100-5 MCG/ACT inhaler Inhale 2 puffs into the lungs 2 times daily 18   Mario Kahn MD   pantoprazole (PROTONIX) 40 MG tablet Take 1 tablet by mouth 2 times daily (before meals) 18   Shanda Mixon MD   folic acid (FOLVITE) 1 MG tablet Take 1 tablet by mouth daily 10/13/18   Carrie Carlos MD   vitamin B-1 100 MG tablet Take 1 tablet by mouth daily 10/13/18   Carrie Carlos MD   vitamin B-12 250 MCG tablet Take 1 tablet by mouth daily 10/13/18   Carrie Carlos MD   gabapentin (NEURONTIN) 300 MG capsule Take 300 mg by mouth 3 times daily. Brandi Delgado Historical Provider, MD   Multiple Vitamins-Minerals (THERAPEUTIC MULTIVITAMIN-MINERALS) tablet Take 1 tablet by mouth daily 18   Velia Alston MD       SOCIAL HISTORY     Tobacco: Currently, 1 pack/day for 30 years  Alcohol: In the past but not current  Illicits: In the past    FAMILY HISTORY     Family History   Problem Relation Age of Onset    Breast Cancer Mother     Heart Disease Father        PHYSICAL EXAM     Vitals: BP (!) 132/95   Pulse 94   Temp 98.2 °F (36.8 °C) (Temporal)   Resp 16   Ht 4' 11\" (1.499 m)   Wt 102 lb (46.3 kg)   LMP 2015   SpO2 100%   BMI 20.60 kg/m²   Tmax: Temp (24hrs), Av.3 °F (36.8 °C), Min:98.2 °F (36.8 °C), Max:98.3 °F (36.8 °C)    Last Body weight:   Wt Readings from Last 3 Encounters:   20 102 lb (46.3 kg)   20 102 lb (46.3 kg)   20 102 lb (46.3 kg)     Body Mass Index : Body mass index is 20.6 kg/m². PHYSICAL EXAMINATION:  Constitutional: This is a well developed, well nourished, 18.5-24.9 - Normal 77y.o. year old female who is in apparent distress and not so alert.   Head:normocephalic and atraumatic. EENT:  PERRLA. No conjunctival injections. Septum was midline, mucosa was without erythema, exudates or cobblestoning. Tongue was dry  Neck: Supple without thyromegaly. No elevated JVP. Trachea was midline. Respiratory:  there are some crackles on on left lung base without wheezing. Cardiovascular: Regular without murmur, clicks, gallops or rubs. Abdomen: Suprapubic tenderness  Lymphatic: No lymphadenopathy. Musculoskeletal: Normal curvature of the spine. No gross muscle weakness. Extremities:  No lower extremity edema, ulcerations, tenderness, varicosities or erythema. Muscle size, tone and strength are normal.  No involuntary movements are noted.     Skin: Her extremities are cold  Neurological/Psychiatric: The patient's general behavior, level of consciousness, thought content and emotional status is normal.          INVESTIGATIONS     Laboratory Testing:     Recent Results (from the past 24 hour(s))   CBC Auto Differential    Collection Time: 07/30/20  4:19 PM   Result Value Ref Range    WBC 12.5 (H) 3.5 - 11.3 k/uL    RBC 4.26 3.95 - 5.11 m/uL    Hemoglobin 13.0 11.9 - 15.1 g/dL    Hematocrit 42.9 36.3 - 47.1 %    .7 82.6 - 102.9 fL    MCH 30.5 25.2 - 33.5 pg    MCHC 30.3 28.4 - 34.8 g/dL    RDW 17.0 (H) 11.8 - 14.4 %    Platelets 053 (H) 876 - 453 k/uL    MPV 8.9 8.1 - 13.5 fL    NRBC Automated 0.0 0.0 per 100 WBC    Differential Type NOT REPORTED     WBC Morphology NOT REPORTED     RBC Morphology NOT REPORTED     Platelet Estimate NOT REPORTED     Immature Granulocytes 5 (H) 0 %    Seg Neutrophils 70 (H) 36 - 66 %    Lymphocytes 17 (L) 24 - 44 %    Monocytes 7 1 - 7 %    Eosinophils % 1 1 - 4 %    Basophils 0 0 - 2 %    Absolute Immature Granulocyte 0.63 (H) 0.00 - 0.30 k/uL    Segs Absolute 8.73 (H) 1.8 - 7.7 k/uL    Absolute Lymph # 2.13 1.0 - 4.8 k/uL    Absolute Mono # 0.88 (H) 0.1 - 0.8 k/uL    Absolute Eos # 0.13 0.0 - 0.4 k/uL    Basophils Absolute 0.00 0.0 - 0.2 k/uL Morphology ANISOCYTOSIS PRESENT    Comprehensive Metabolic Panel w/ Reflex to MG    Collection Time: 07/30/20  4:19 PM   Result Value Ref Range    Glucose 94 70 - 99 mg/dL    BUN 26 (H) 8 - 23 mg/dL    CREATININE 1.37 (H) 0.50 - 0.90 mg/dL    Bun/Cre Ratio NOT REPORTED 9 - 20    Calcium 9.9 8.6 - 10.4 mg/dL    Sodium 140 135 - 144 mmol/L    Potassium 4.0 3.7 - 5.3 mmol/L    Chloride 106 98 - 107 mmol/L    CO2 16 (L) 20 - 31 mmol/L    Anion Gap 18 (H) 9 - 17 mmol/L    Alkaline Phosphatase 71 35 - 104 U/L    ALT 8 5 - 33 U/L    AST 19 <32 U/L    Total Bilirubin 0.38 0.3 - 1.2 mg/dL    Total Protein 8.4 (H) 6.4 - 8.3 g/dL    Alb 3.5 3.5 - 5.2 g/dL    Albumin/Globulin Ratio 0.7 (L) 1.0 - 2.5    GFR Non- 38 (L) >60 mL/min    GFR  47 (L) >60 mL/min    GFR Comment          GFR Staging NOT REPORTED    Troponin    Collection Time: 07/30/20  4:19 PM   Result Value Ref Range    Troponin, High Sensitivity 19 (H) 0 - 14 ng/L    Troponin T NOT REPORTED <0.03 ng/mL    Troponin Interp NOT REPORTED    Brain Natriuretic Peptide    Collection Time: 07/30/20  4:19 PM   Result Value Ref Range    Pro-BNP 1,101 (H) <300 pg/mL    BNP Interpretation Pro-BNP Reference Range:    Protime-INR    Collection Time: 07/30/20  8:16 PM   Result Value Ref Range    Protime 10.2 9.0 - 12.0 sec    INR 1.0    APTT    Collection Time: 07/30/20  8:16 PM   Result Value Ref Range    PTT 23.7 20.5 - 30.5 sec       Imaging:   Xr Chest (2 Vw)    Result Date: 7/24/2020  Interval development of left lower lobe parenchymal consolidation which could reflect pneumonia, atelectasis or combination thereof. Correlate clinically and follow-up chest radiographs in 6-8 weeks would be recommended after appropriate therapy has been administered to ensure resolution. There is additional lingular left lower lobe subsegmental atelectasis.      Xr Chest Portable    Result Date: 7/30/2020  Left hilar masslike opacity with worsening atelectasis involving the left lower lobe compared to the prior study from July 27th. Malignancy is suspected with possible developing compression involving the left lower lobe bronchus. Developing superimposed pneumonia cannot be excluded. Further evaluation with CT is recommended. Xr Chest Portable    Result Date: 7/27/2020  Masslike left hilar opacity for which contrast-enhanced chest CT is recommended to evaluate the possibility of a neoplasm versus masslike consolidation. Ct Chest Pulmonary Embolism W Contrast    Result Date: 7/30/2020  Few scattered small segmental subsegmental PE identified involving right lower lobe. Masslike opacity involving left lower lobe favor pneumonia. Follow-up to assure resolution following medical treatment course recommended. Critical results were called by Dr. Craig Mohs to [facility] on 7/30/2020 at 18:45. ASSESSMENT & PLAN     ASSESSMENT / PLAN:     IMPRESSION  This is a 77 y.o. female who presented with headache, cough and shortness of breath. The CT chest shows right lower lobe subsegmental PE and masslike opacity involving the left lower lobe favoring pneumonia. Left lower lobe pneumonia vs malignancy. Cough with shortness of breath. CXR> left hilar masslike opacity. CT chest>few scattered small segmental, subsegmental PE involving right lower lobe & Masslike opacity involving left lower lobe favor pneumonia. Start Rocephin 1 g every 24 and azithromycin 500 mg every 24. Gentle hydration at 75 mL/h. Will send blood cultures and lactic acid. Segmental, subsegmental lower lobe pulmonary embolism>CT chest>few scattered small segmental, subsegmental PE involving right lower lobe. Started on heparin drip. Consulting pulmonology    Chronic diastolic heart UTZALLY>64/76 ECHO> hyperdynamic EF 69%, mild grade 1 diastolic dysfunction, mild LVH-basal septal hypertrophy. proBNP 1101. currently not on any medication.   Will get echo tomorrow morning    Suprapubic tenderness likely due to UTI> will get UA reflex to culture. On Rocephin    History of drug abuse> will get U tox    Headache> likely due to migraine. Tylenol as needed    COPD> stable. Resume DuoNeb, albuterol, Symbicort and oxygen titration for goal SaO2 88-92%    History of hepatitis C in the past with GI bleed> EGD on 11/05/2018 for melena>1 CM ulcer in duodenum at bulb/D2 with bleeding and pulsating visible vessel - tx with epi and endo clips. Continue Protonix 40 mg twice daily      DVT ppx: Heparin drip  GI ppx: Protonix    PT/OT/SW: Ongoing  Discharge Planning: Ongoing    Elizabeth Henson MD  Internal Medicine Resident, PGY-1  [facility]; [place], [place]  7/30/2020, 9:41 PM  Attending Physician Statement  I have discussed the care of Emi Daniels and I have examined the patient myselft and taken ros and hpi , including pertinent history and exam findings,  with the resident. I have reviewed the key elements of all parts of the encounter with the resident. I agree with the assessment, plan and orders as documented by the resident. late note entry.       Electronically signed by Mirza Pierre MD

## 2020-07-31 NOTE — PROGRESS NOTES
[facility] Pulmonary embolism on right ([address] [I26.99] . Vitals:    07/30/20 2104   BP: (!) 132/95   Pulse: 94   Resp: 16   Temp: 98.2 °F (36.8 °C)   SpO2: 100%   . Patients home meds are   Prior to Admission medications    Medication Sig Start Date End Date Taking? Authorizing Provider   doxycycline monohydrate (ADOXA) 100 MG tablet Take 1 tablet by mouth 2 times daily for 10 days 7/24/20 8/3/20  Malena Max,    docusate sodium (COLACE) 100 MG capsule Take 1 capsule by mouth 2 times daily for 14 days 7/16/20 7/30/20  Miladis Pickens, DO   ibuprofen (ADVIL;MOTRIN) 600 MG tablet Take 1 tablet by mouth 4 times daily as needed for Pain 7/16/20 7/30/20  Miladis Pickens,    senna (SENOKOT) 8.6 MG TABS tablet Take 1 tablet by mouth daily for 14 days 7/16/20 7/30/20  Miladis Pickens DO   diclofenac (VOLTAREN) 50 MG EC tablet Take 1 tablet by mouth 2 times daily 4/6/20   Kassi Blum DO   amitriptyline (ELAVIL) 10 MG tablet Take 1 tablet by mouth nightly 1/31/20   Annel Andino DO   atorvastatin (LIPITOR) 40 MG tablet Take 1 tablet by mouth nightly 1/31/20   Gypsy Kruse DO   butalbital-acetaminophen-caffeine (FIORICET, ESGIC) -40 MG per tablet Take 1 tablet by mouth every 4 hours as needed for Headaches 1/31/20   Gypsy Kruse DO   clopidogrel (PLAVIX) 75 MG tablet Take 1 tablet by mouth daily 2/1/20   Annel Andino DO   albuterol sulfate HFA (PROVENTIL HFA) 108 (90 Base) MCG/ACT inhaler Inhale 1-2 puffs into the lungs once for 1 dose 1/9/20 6/15/20  Julianne Cantu MD   ondansetron (ZOFRAN ODT) 4 MG disintegrating tablet Take 1 tablet by mouth every 8 hours as needed for Nausea 9/9/19   Sarah Elgin, PA-C   docusate sodium (COLACE, DULCOLAX) 100 MG CAPS Take 100 mg by mouth 2 times daily 8/22/19   Doe Lewis MD   [facility]  Devices [facility]) MISC 1 Device by Does not apply route once for 1 dose 8/5/19 8/5/19  Sarah Wei PA-C hydrOXYzine (ATARAX) 25 MG tablet Take 1 tablet by mouth every 8 hours as needed for Itching 8/5/19   Jose A Manriquez PA-C   benzonatate (TESSALON PERLES) 100 MG capsule Take 1 capsule by mouth 3 times daily as needed for Cough 3/11/19   Howard Lopez DO   carvedilol (COREG) 6.25 MG tablet Take 1 tablet by mouth 2 times daily (with meals) 2/7/19   Jamila Romero MD   ferrous sulfate (MELISSA-HAO) 325 (65 Fe) MG tablet Take 1 tablet by mouth daily 2/7/19   Jamila Romero MD   melatonin 1 MG tablet Take 1 tablet by mouth nightly as needed for Sleep 1/11/19   Kentrell Baker MD   ipratropium-albuterol (DUONEB) 0.5-2.5 (3) MG/3ML SOLN nebulizer solution Inhale 3 mLs into the lungs every 4 hours as needed for Shortness of Breath 1/11/19   Kentrell Baker MD   mometasone-formoterol [facility]) 100-5 MCG/ACT inhaler Inhale 2 puffs into the lungs 2 times daily 12/5/18   Saba Ortiz MD   pantoprazole (PROTONIX) 40 MG tablet Take 1 tablet by mouth 2 times daily (before meals) 11/13/18   Alexx Roach MD   folic acid (FOLVITE) 1 MG tablet Take 1 tablet by mouth daily 10/13/18   Sowmya Watters MD   vitamin B-1 100 MG tablet Take 1 tablet by mouth daily 10/13/18   Sowmya Watters MD   vitamin B-12 250 MCG tablet Take 1 tablet by mouth daily 10/13/18   Sowmya Watters MD   gabapentin (NEURONTIN) 300 MG capsule Take 300 mg by mouth 3 times daily. Teodoro Gomez Historical MD Marnie   Multiple Vitamins-Minerals (THERAPEUTIC MULTIVITAMIN-MINERALS) tablet Take 1 tablet by mouth daily 8/12/18   Zulema Price MD     Assessment    Pt is in pain. Has COPD/smoking history. Is unsure about how breathing is currently.  SpO2 is 96% on 3L nc     RR 20  Breath Sounds: Clear/diminished    · Bronchodilator assessment at level  3  · [x]    Bronchodilator Assessment  BRONCHODILATOR ASSESSMENT SCORE  Score 0 1 2 3 4 5   Breath Sounds   []  Patient Baseline [x]  No Wheeze good aeration []  Faint, scattered wheezing, good aeration []  Expiratory Wheezing and or moderately diminished []  Insp/Exp wheeze and/or very diminished []  Insp/Exp and/ or marked distress   Respiratory Rate   []  Patient Baseline []  Less than 20 []  Less than 20 [x]  20-25 []  Greater than 25 []  Greater than 25   Peak flow % of Pred or PB [x]  NA   []  Greater than 90%  []  81-90% []  71-80% []  Less than or equal to 70%  or unable to perform []  Unable due to Respiratory Distress   Dyspnea re []  Patient Baseline []  No SOB []  No SOB [x]  SOB on exertion []  SOB min activity []  At rest/acute   e FEV% Predicted       [x]  NA []  Above 69%  []  Unable []  Above 60-69%  []  Unable []  Above 50-59%  []  Unable []  Above 35-49%  []  Unable []  Less than 35%  []  Unable          Verlean Sicard  10:00 PM

## 2020-07-31 NOTE — FLOWSHEET NOTE
Assessment: The patient stated she was feeling better then she had in the past. The patient was tired and trying to stay awake. The patient talked about malcolm in God and asked for prayer. Intervention:  engaged in active listening.  explored the patients thoughts and feeling surrounding their situation.  inquired about the patients support system; family, friends, and community groups. The  informed the patient that chaplains are available 24/7. Outcome: Patient was thankful for the visit and comforted by prayer. 07/31/20 1547   Encounter Summary   Services provided to: Patient   [facility]   Support System Unknown   Continue Visiting   (07/31/20)   Complexity of Encounter Moderate   Length of Encounter 15 minutes   Spiritual Assessment Completed Yes   Routine   Type Initial   Assessment Calm; Approachable   Intervention Active listening;Explored feelings, thoughts, concerns;Explored coping resources;Nurtured hope;Prayer   Outcome Expressed gratitude;Comfort

## 2020-07-31 NOTE — PROGRESS NOTES
Occupational Therapy Not Seen Note    DATE: 2020  Name: Buster Pearson  : 1955  MRN: [id number]    Patient not available for Occupational Therapy due to:     Other: pt with acute PE, heparin started  @    Next Scheduled Treatment: check back 2020    Electronically signed by NILDA Miranda on 2020 at 8:45 AM

## 2020-08-01 LAB
ABSOLUTE EOS #: 0.24 K/UL (ref 0–0.44)
ABSOLUTE IMMATURE GRANULOCYTE: 0.38 K/UL (ref 0–0.3)
ABSOLUTE LYMPH #: 1.62 K/UL (ref 1.1–3.7)
ABSOLUTE MONO #: 0.81 K/UL (ref 0.1–1.2)
ANION GAP SERPL CALCULATED.3IONS-SCNC: 9 MMOL/L (ref 9–17)
BASOPHILS # BLD: 1 % (ref 0–2)
BASOPHILS ABSOLUTE: 0.05 K/UL (ref 0–0.2)
BUN BLDV-MCNC: 12 MG/DL (ref 8–23)
BUN/CREAT BLD: ABNORMAL (ref 9–20)
CALCIUM SERPL-MCNC: 8.6 MG/DL (ref 8.6–10.4)
CHLORIDE BLD-SCNC: 113 MMOL/L (ref 98–107)
CO2: 16 MMOL/L (ref 20–31)
CREAT SERPL-MCNC: 0.62 MG/DL (ref 0.5–0.9)
DIFFERENTIAL TYPE: ABNORMAL
EKG ATRIAL RATE: 90 BPM
EKG P AXIS: 59 DEGREES
EKG P-R INTERVAL: 140 MS
EKG Q-T INTERVAL: 324 MS
EKG QRS DURATION: 70 MS
EKG QTC CALCULATION (BAZETT): 396 MS
EKG R AXIS: 48 DEGREES
EKG T AXIS: -67 DEGREES
EKG VENTRICULAR RATE: 90 BPM
EOSINOPHILS RELATIVE PERCENT: 2 % (ref 1–4)
GFR AFRICAN AMERICAN: >60 ML/MIN
GFR NON-AFRICAN AMERICAN: >60 ML/MIN
GFR SERPL CREATININE-BSD FRML MDRD: ABNORMAL ML/MIN/{1.73_M2}
GFR SERPL CREATININE-BSD FRML MDRD: ABNORMAL ML/MIN/{1.73_M2}
GLUCOSE BLD-MCNC: 86 MG/DL (ref 70–99)
HCT VFR BLD CALC: 29.5 % (ref 36.3–47.1)
HCT VFR BLD CALC: 32.2 % (ref 36.3–47.1)
HEMOGLOBIN: 9.6 G/DL (ref 11.9–15.1)
HEMOGLOBIN: 9.9 G/DL (ref 11.9–15.1)
IMMATURE GRANULOCYTES: 4 %
LYMPHOCYTES # BLD: 16 % (ref 24–43)
MAGNESIUM: 1.7 MG/DL (ref 1.6–2.6)
MCH RBC QN AUTO: 30.7 PG (ref 25.2–33.5)
MCHC RBC AUTO-ENTMCNC: 30.7 G/DL (ref 28.4–34.8)
MCV RBC AUTO: 99.7 FL (ref 82.6–102.9)
MONOCYTES # BLD: 8 % (ref 3–12)
MRSA, DNA, NASAL: NORMAL
NRBC AUTOMATED: 0 PER 100 WBC
PDW BLD-RTO: 16.8 % (ref 11.8–14.4)
PLATELET # BLD: 685 K/UL (ref 138–453)
PLATELET ESTIMATE: ABNORMAL
PMV BLD AUTO: 9 FL (ref 8.1–13.5)
POTASSIUM SERPL-SCNC: 3.4 MMOL/L (ref 3.7–5.3)
RBC # BLD: 3.23 M/UL (ref 3.95–5.11)
RBC # BLD: ABNORMAL 10*6/UL
SEG NEUTROPHILS: 69 % (ref 36–65)
SEGMENTED NEUTROPHILS ABSOLUTE COUNT: 7.01 K/UL (ref 1.5–8.1)
SODIUM BLD-SCNC: 138 MMOL/L (ref 135–144)
SPECIMEN DESCRIPTION: NORMAL
WBC # BLD: 10.1 K/UL (ref 3.5–11.3)
WBC # BLD: ABNORMAL 10*3/UL

## 2020-08-01 PROCEDURE — 80048 BASIC METABOLIC PNL TOTAL CA: CPT

## 2020-08-01 PROCEDURE — 6370000000 HC RX 637 (ALT 250 FOR IP): Performed by: STUDENT IN AN ORGANIZED HEALTH CARE EDUCATION/TRAINING PROGRAM

## 2020-08-01 PROCEDURE — 85018 HEMOGLOBIN: CPT

## 2020-08-01 PROCEDURE — 83735 ASSAY OF MAGNESIUM: CPT

## 2020-08-01 PROCEDURE — 2060000000 HC ICU INTERMEDIATE R&B

## 2020-08-01 PROCEDURE — 99239 HOSP IP/OBS DSCHRG MGMT >30: CPT | Performed by: INTERNAL MEDICINE

## 2020-08-01 PROCEDURE — 85025 COMPLETE CBC W/AUTO DIFF WBC: CPT

## 2020-08-01 PROCEDURE — 6360000002 HC RX W HCPCS: Performed by: STUDENT IN AN ORGANIZED HEALTH CARE EDUCATION/TRAINING PROGRAM

## 2020-08-01 PROCEDURE — 36415 COLL VENOUS BLD VENIPUNCTURE: CPT

## 2020-08-01 PROCEDURE — 85014 HEMATOCRIT: CPT

## 2020-08-01 PROCEDURE — 94640 AIRWAY INHALATION TREATMENT: CPT

## 2020-08-01 PROCEDURE — 99253 IP/OBS CNSLTJ NEW/EST LOW 45: CPT | Performed by: NURSE PRACTITIONER

## 2020-08-01 RX ORDER — CARVEDILOL 12.5 MG/1
12.5 TABLET ORAL 2 TIMES DAILY WITH MEALS
Status: DISCONTINUED | OUTPATIENT
Start: 2020-08-01 | End: 2020-08-02 | Stop reason: HOSPADM

## 2020-08-01 RX ORDER — SODIUM CHLORIDE, SODIUM LACTATE, POTASSIUM CHLORIDE, CALCIUM CHLORIDE 600; 310; 30; 20 MG/100ML; MG/100ML; MG/100ML; MG/100ML
INJECTION, SOLUTION INTRAVENOUS CONTINUOUS
Status: DISCONTINUED | OUTPATIENT
Start: 2020-08-01 | End: 2020-08-02

## 2020-08-01 RX ORDER — HALOPERIDOL 5 MG/ML
2 INJECTION INTRAMUSCULAR ONCE
Status: COMPLETED | OUTPATIENT
Start: 2020-08-01 | End: 2020-08-01

## 2020-08-01 RX ORDER — POTASSIUM CHLORIDE 20 MEQ/1
40 TABLET, EXTENDED RELEASE ORAL ONCE
Status: COMPLETED | OUTPATIENT
Start: 2020-08-01 | End: 2020-08-01

## 2020-08-01 RX ORDER — PANTOPRAZOLE SODIUM 40 MG/1
40 TABLET, DELAYED RELEASE ORAL
Status: DISCONTINUED | OUTPATIENT
Start: 2020-08-01 | End: 2020-08-02 | Stop reason: HOSPADM

## 2020-08-01 RX ORDER — PANTOPRAZOLE SODIUM 40 MG/10ML
40 INJECTION, POWDER, LYOPHILIZED, FOR SOLUTION INTRAVENOUS 2 TIMES DAILY
Status: DISCONTINUED | OUTPATIENT
Start: 2020-08-01 | End: 2020-08-02 | Stop reason: HOSPADM

## 2020-08-01 RX ORDER — SODIUM CHLORIDE 9 MG/ML
10 INJECTION INTRAVENOUS 2 TIMES DAILY
Status: DISCONTINUED | OUTPATIENT
Start: 2020-08-01 | End: 2020-08-02 | Stop reason: HOSPADM

## 2020-08-01 RX ADMIN — Medication 100 MG: at 09:28

## 2020-08-01 RX ADMIN — LEVOFLOXACIN 750 MG: 750 TABLET, FILM COATED ORAL at 09:27

## 2020-08-01 RX ADMIN — CLOPIDOGREL 75 MG: 75 TABLET, FILM COATED ORAL at 09:27

## 2020-08-01 RX ADMIN — MULTIPLE VITAMINS W/ MINERALS TAB 1 TABLET: TAB at 09:28

## 2020-08-01 RX ADMIN — POTASSIUM CHLORIDE 40 MEQ: 1500 TABLET, EXTENDED RELEASE ORAL at 09:00

## 2020-08-01 RX ADMIN — PANTOPRAZOLE SODIUM 40 MG: 40 TABLET, DELAYED RELEASE ORAL at 17:02

## 2020-08-01 RX ADMIN — CARVEDILOL 6.25 MG: 6.25 TABLET, FILM COATED ORAL at 09:27

## 2020-08-01 RX ADMIN — DESMOPRESSIN ACETATE 40 MG: 0.2 TABLET ORAL at 21:58

## 2020-08-01 RX ADMIN — HALOPERIDOL LACTATE 2 MG: 5 INJECTION, SOLUTION INTRAMUSCULAR at 13:45

## 2020-08-01 RX ADMIN — APIXABAN 10 MG: 5 TABLET, FILM COATED ORAL at 09:27

## 2020-08-01 RX ADMIN — APIXABAN 10 MG: 5 TABLET, FILM COATED ORAL at 21:58

## 2020-08-01 RX ADMIN — BUDESONIDE AND FORMOTEROL FUMARATE DIHYDRATE 2 PUFF: 80; 4.5 AEROSOL RESPIRATORY (INHALATION) at 20:15

## 2020-08-01 NOTE — PROGRESS NOTES
PULMONARY & CRITICAL CARE MEDICINE PROGRESS  NOTE     Patient:  Minnie Porter  MRN: [id number]  Admit date: 7/30/2020  CODE Status: Full Code     SUBJECTIVE     I personally interviewed/examined the patient, reviewed interval history and interpreted all available radiographic, laboratory data at the time of service. Chief Compliant/Reason for Initial Consult: cough, SOB, PE    Brief Hospital Course: The patient is a 77 y.o. female PMH COPD, past CVA, chronic diastolic CHF, avascular necrosis of right hip status post right hip placement presented with cough and shortness of breath. In the ED, found to be desaturating, cough present. Placed on 3 L NC, found to have elevated proBNP. Chest x-ray showed left hilar masslike opacity with worsening atelectasis of LLL.   CT PE was done showed few scattered small segmental and subsegmental PE involving RLL with confirmation of LLL masslike opacity favoring pneumonia    Interval History:  08/01/20  Patient seen and examined  Afebrile, slight tachycardia noted in 100s, RR in low 20s, however patient subjectively appears comfortable, on room air, SPO2 96%  Reported episode of confusion overnight, however appears appropriate this morning    Review of Systems  Review of Systems:  General: negative for chills, fatigue or fever  ENT: negative for headaches, nasal congestion, sore throat or visual changes  Allergy and Immunology: negative for postnasal drip or seasonal allergies  Hematological and Lymphatic: negative for bleeding problems, swollen lymph nodes  Respiratory: positive for shortness of breath negative for hemoptysis  Cardiovascular: negative for edema or palpitations  Gastrointestinal: negative for abdominal pain, change in bowel habits or nausea/vomiting  Genito-Urinary: negative for dysuria or urinary frequency/urgency  Musculoskeletal: negative for joint pain or joint swelling  Neurological: ON 8/7/2020] apixaban  5 mg Oral BID    levoFLOXacin  750 mg Oral Daily    sodium chloride flush  10 mL Intravenous 2 times per day    atorvastatin  40 mg Oral Nightly    carvedilol  6.25 mg Oral BID WC    clopidogrel  75 mg Oral Daily    thiamine  100 mg Oral Daily    sodium chloride flush  10 mL Intravenous 2 times per day    nicotine  1 patch Transdermal Daily    therapeutic multivitamin-minerals  1 tablet Oral Daily    budesonide-formoterol  2 puff Inhalation BID     Continuous Infusions:   lactated ringers      dextrose       LABS:-  ABG:   No results for input(s): POCPH, POCPCO2, POCPO2, POCHCO3, XWWH8RQJ in the last 72 hours. CBC:   Recent Labs     07/30/20  1619 07/31/20 0340 08/01/20  0605   WBC 12.5* 11.9* 10.1   HGB 13.0 11.8* 9.9*   HCT 42.9 39.8 32.2*   .7 104.7* 99.7   * 734* 685*   LYMPHOPCT 17* 19* 16*   RBC 4.26 3.80* 3.23*   MCH 30.5 31.1 30.7   MCHC 30.3 29.6 30.7   RDW 17.0* 17.2* 16.8*     BMP:   Recent Labs     07/30/20  1619 07/31/20 0340 08/01/20  0605    139 138   K 4.0 4.1 3.4*    107 113*   CO2 16* 12* 16*   BUN 26* 20 12   CREATININE 1.37* 0.80 0.62   GLUCOSE 94 48* 86     Liver Function Test:   Recent Labs     07/30/20  1619   PROT 8.4*   LABALBU 3.5   ALT 8   AST 19   ALKPHOS 71   BILITOT 0.38     Amylase/Lipase:  No results for input(s): AMYLASE, LIPASE in the last 72 hours. Coagulation Profile:   Recent Labs     07/30/20 2016 07/31/20  0616   INR 1.0  --    PROTIME 10.2  --    APTT 23.7 >120.0*     Cardiac Enzymes:  No results for input(s): CKTOTAL, CKMB, CKMBINDEX, TROPONINI in the last 72 hours.   Lactic Acid:  Lab Results   Component Value Date    LACTA NOT REPORTED 08/12/2018    LACTA NOT REPORTED 08/11/2018     BNP:   No results found for: BNP  D-Dimer:  Lab Results   Component Value Date    DDIMER 1.90 07/19/2020     Others:   Lab Results   Component Value Date    TSH 1.56 02/25/2020    N1TSTPT 126 10/10/2018    K8GDTWS 7.4 10/10/2018 Lab Results   Component Value Date    BARBARA NEGATIVE 02/12/2018    SEDRATE 18 01/30/2020    CRP 57.4 (H) 07/31/2020     Lab Results   Component Value Date    URICACID 3.5 11/16/2015     Lab Results   Component Value Date    IRON 36 (L) 07/31/2020    TIBC 249 (L) 07/31/2020    FERRITIN 138 07/31/2020     No results found for: SPEP, UPEP  Lab Results   Component Value Date     4 11/14/2011       Input/Output:    Intake/Output Summary (Last 24 hours) at 8/1/2020 0938  Last data filed at 8/1/2020 0539  Gross per 24 hour   Intake 2660.55 ml   Output --   Net 2660.55 ml       Microbiology:    Recent Labs     07/30/20  [address] . BLOOD   SPECIAL RT HAND 1.5ML   CULTURE NO GROWTH 1 DAY       Pathology:    Radiology images:    Radiology reports:  CT CHEST PULMONARY EMBOLISM W CONTRAST   Final Result   Few scattered small segmental subsegmental PE identified involving right   lower lobe. Masslike opacity involving left lower lobe favor pneumonia. Follow-up to   assure resolution following medical treatment course recommended. Critical results were called by Dr. Ryan Lomeli to [facility] on   7/30/2020 at 18:45. XR CHEST PORTABLE   Final Result   Left hilar masslike opacity with worsening atelectasis involving the left   lower lobe compared to the prior study from July 27th. Malignancy is   suspected with possible developing compression involving the left lower lobe   bronchus. Developing superimposed pneumonia cannot be excluded. Further   evaluation with CT is recommended.          VL DUP LOWER EXTREMITY VENOUS BILATERAL    (Results Pending)       Echocardiogram:   Results for orders placed during the hospital encounter of 11/13/19   ECHO Complete 2D W Doppler W Color    Narrative Transthoracic Echocardiography Report (TTE)     Patient Name MATT CHAUDHRY  Date of Study             11/14/2019      Date of      1955      Gender                    Female   Birth      Age          75 year(s)      Race                      Black      Room Number  1006            Height:                   60 inch, 152.4 cm      Corporate ID [id number]        Weight:                   100 pounds, 45.4   #                                                      kg      Patient Acct [de-identified]       BSA:         1.39 m^2     BMI:     19.53   #                                                               kg/m^2      MR #         [id number]         [address]      Accession #  [id number]       Interpreting Physician    [facility]      Fellow                       Referring Nurse                                Practitioner      Interpreting Phuong Noland  Referring Physician       Maria Luisa Modi MD   Fellow     Type of Study      TTE procedure:2D Echocardiogram, M-Mode, Doppler, Color Doppler. Procedure Date  Date: 11/14/2019 Start: 09:29 AM    Study Location: [facility] / [employer] [facility] [place]. Comments:  Procedure explained to patient. ELEVATED BNP    Patient Status: Inpatient    Height: 60 inches Weight: 100 pounds BSA: 1.39 m^2 BMI: 19.53 kg/m^2    CONCLUSIONS    Summary  Small LV cavity. Hyperdynamic left ventricular function. Calculated EF via heart model method  is 69 %. Moderate left ventricular hypertrophy. Basal septal hypertrophy (\"sigmoid  septum\"). Grade I (mild) left ventricular diastolic dysfunction. Left atrium is mildly dilated. Mild tricuspid regurgitation. Estimated right ventricular systolic pressure  is 35 mmHg.     Signature  ----------------------------------------------------------------------------   Electronically signed by Fabian Maier on 11/14/2019 10:11   AM  ----------------------------------------------------------------------------    ----------------------------------------------------------------------------   Electronically signed by Jesus Christian(Interpreting physician) on 11/14/2019   10:14 AM  ----------------------------------------------------------------------------  FINDINGS  Left Atrium  Left atrium is mildly dilated. Left Ventricle  Small LV cavity. Moderate left ventricular hypertrophy. Basal septal hypertrophy (\"sigmoid septum\"). Hyperdynamic left ventricular function. Calculated EF via heart model method is 69 %. Grade I (mild) left ventricular diastolic dysfunction. Right Atrium  Right atrium is normal in size. Right Ventricle  Normal right ventricular size and function. Mitral Valve  Normal mitral valve structure and function. No evidence of mitral stenosis or mitral regurgitation. Aortic Valve  Normal aortic valve structure and function without stenosis or  regurgitation. Aortic valve is trileaflet. Tricuspid Valve  Normal tricuspid valve structure and function. Mild tricuspid regurgitation. Estimated right ventricular systolic pressure is 35 mmHg. Pulmonic Valve  Mild to moderate pulmonic insufficiency. Pericardial Effusion  No significant pericardial effusion is seen. Anterior echo free space suggestive of fat pad. Miscellaneous  Normal aortic root dimension. IVC normal diameter & inspiratory collapse indicating normal RA filling  pressure .   E/E' = 13 .    M-mode / 2D Measurements & Calculations:      LVIDd:3 cm(3.7 - 5.6 cm)         Diastolic LJNDY ml   LVJNL:9.7 cm(2.2 - 4.0 cm)       Systolic WMGOZW:37 ml   ALDD:5.4 cm(0.6 - 1.1 cm)        Aortic Root:2.9 cm(2.0 - 3.7 cm)   LVPWd:1.3 cm(0.6 - 1.1 cm)       LA Dimension: 4.1 cm(1.9 - 4.0 cm)   Fractional Shortenin.67 %   Calculated LVEF (%): 68.35 %     LVOT:2 cm      Mitral:                                 Aortic      Valve Area (P1/2-Time): 3.79 cm^2       Peak Velocity: 1.55 m/s   Peak E-Wave: 0.77 m/s                   Mean Velocity: 1.19 m/s   Peak A-Wave: 1.03 m/s                   Peak Gradient: 9.61 mmHg   E/A Ratio: 0.74                         Mean Gradient: 6 mmHg   Peak Gradient: 2.35 mmHg Mean Gradient: 2 mmHg   Deceleration Time: 197 msec             Area (continuity): 2.43 cm^2   P1/2t: 58 msec                          AV VTI: 35.8 cm      Area (continuity): 3.58 cm^2   Mean Velocity: 0.67 m/s      Tricuspid:                              Pulmonic:      Estimated RVSP: 35 mmHg                 Peak Velocity: 1.05 m/s   Peak TR Velocity: 2.84 m/s              Peak Gradient: 4.41 mmHg   Peak TR Gradient: 32.2624 mmHg   Estimated RA Pressure: 3 mmHg           ID ED Velocity: 1.03 m/s                                              Estimated PASP: 35.26 mmHg     Diastology / Tissue Doppler  Septal Wall E' velocity:0.05 m/s  Septal Wall E/E':14.1  Lateral Wall E' velocity:0.06 m/s  Lateral Wall E/E':12.1       Cardiac Catheterization:   No results found for this or any previous visit. ASSESSMENT AND PLAN     Assessment:    // Pneumonia  // Pulmonary embolism on right  // COPD  // Chronic diastolic CHF    Plan:  Currently on Eliquis 10 mg twice daily for 7 days and continue with 5 mg twice daily thereafter  Respiratory failure likely secondary to PE and underlying COPD, recommend continuing Levaquin 750 p.o. daily  Continue Symbicort and DuoNeb, saturating well on RA    I updated the patient regarding the current clinical condition, provisional diagnosis and management plan. I addressed concerns and answered all questions to the best of my abilities. It was my pleasure to evaluate Emi Daniels today. We will continue to follow. I would like to thank you for allowing me to participate in the care of this patient. Please feel free to call with any further questions or concerns. Jeannie Jacobs M.D. Pulmonary and Critical Care Medicine           8/1/2020, 9:38 AM    Please note that this chart was generated using voice recognition Dragon dictation software. Although every effort was made to ensure the accuracy of this automated transcription, some errors in transcription may have occurred.

## 2020-08-01 NOTE — PLAN OF CARE
Problem: Falls - Risk of:  Goal: Will remain free from falls  Description: Will remain free from falls  Outcome: Met This Shift  Goal: Absence of physical injury  Description: Absence of physical injury  Outcome: Met This Shift     Problem: Pain:  Goal: Pain level will decrease  Description: Pain level will decrease  Outcome: Ongoing  Goal: Control of acute pain  Description: Control of acute pain  Outcome: Ongoing     Problem: Skin Integrity:  Goal: Will show no infection signs and symptoms  Description: Will show no infection signs and symptoms  Outcome: Ongoing     Problem: Restraint Use - Nonviolent/Non-Self-Destructive Behavior:  Goal: Absence of restraint indications  Description: Absence of restraint indications  Outcome: Ongoing  Goal: Absence of restraint-related injury  Description: Absence of restraint-related injury  Outcome: Ongoing

## 2020-08-01 NOTE — CONSULTS
[facility]  Consultation Note     . REASON FOR CONSULTATION:    Drop in Hgb from 13-9.9asymptomatic  H/O duodenal ulcer  Recently started on Eliquis for new PE    HISTORY OF PRESENT ILLNESS:   HPI is limited as pt is uncooperative with interview  77 yr old female with PMH COPD, migraine, h/o CVA, chronic diastolic congestive heart failure, right hip replacement s/p avascular necrosis, Hep C, PUD and Pancreatitis presents with a chief complaint of headache, cough and shortness of breath. Pt was found to have acute PE and was started on Eliquis. GI was consulted due to the fact that her Hgb acutely dropped from 11.8-9.9. No overt s/s of bleeding noted. Pt is asymptomatic. She was seen November 2018 for GI bleed and underwent EGD as well as colonoscopy. EGD showed duodenal ulcer treated with clips, colonoscopy showed near ulcerations at ileocecal valve, no signs of bleeding.     Based on patient's chart, she has a history of gastric bypass. Patient denies history of gastric bypass. No mention of altered anatomy noted on EGD. Pt refusing rectal exam, IV to be restarted and further questions. Pt has been uncooperative with staff and currently has a roll belt on in bed.      Review of meds show pt was taking Motrin at home and Plavix    Past Medical/Social/Family History:  Past Medical History:   Diagnosis Date    Appendicitis 3/27/2017    Cerebral artery occlusion with cerebral infarction ([address]     CHF (congestive heart failure) (HCC)     Chronic respiratory failure with hypoxia and hypercapnia (HCC)     Chronic rhinitis     Cigarette smoker     COPD (chronic obstructive pulmonary disease) ([address] 8/5/2018    Depression     Dysphagia     Essential hypertension 8/11/2018    GERD (gastroesophageal reflux disease)     Heart failure, diastolic, with acute decompensation ([address] 9/5/2018    Hepatitis C, chronic (HCC)     History of smoking at least 1 pack per day for at least 30 years     Hyperlipidemia     Hypertension     Migraine     Moderate COPD (chronic obstructive pulmonary disease) (HCC)     Multiple transfusions     Neurogenic dysphagia     Osteoarthritis     hands    Pneumonia     Scoliosis     Substance abuse ([facility]     ivda and cocaine abuse in past     Past Surgical History:   Procedure Laterality Date    APPENDECTOMY  2017     SECTION      GASTRIC BYPASS SURGERY      HIP SURGERY Right 2019    HIP TOTAL ARTHROPLASTY ANTERIOR APPROACH - [facility],  C-ARM, (Right )    LAPAROSCOPIC APPENDECTOMY N/A 3/27/2017    APPENDECTOMY LAPAROSCOPIC performed by Lyric Pinzon MD at [address] [facility]      hgsil    DE COLONOSCOPY W/BIOPSY SINGLE/MULTIPLE N/A 11/10/2018    COLONOSCOPY WITH BIOPSY performed by Mariana Reynolds MD at [facility]    DE EGD TRANSORAL BIOPSY SINGLE/MULTIPLE N/A 2018    EGD ESOPHAGOGASTRODUODENOSCOPY performed by Katerine Barrett MD at [address] [place] [address] Road Right 2019    HIP TOTAL ARTHROPLASTY ANTERIOR APPROACH - [facility],  C-ARM, performed by Reynold Poole DO at [place] [address]      oesophagitis, candidiasis    UPPER GASTROINTESTINAL ENDOSCOPY  2018    UPPER GASTROINTESTINAL ENDOSCOPY N/A 2018    EGD CONTROL HEMORRHAGE performed by Katerine Barrett MD at [facility] History   Problem Relation Age of Onset    Breast Cancer Mother     Heart Disease Father        Allergies: Allergies   Allergen Reactions    Aspirin Itching    Keflet [Cephalexin]      Tolerated penicillin V , tolerates cephalexin        Home medications:  Prior to Admission medications    Medication Sig Start Date End Date Taking?  Authorizing Provider   apixaban (ELIQUIS) 5 MG TABS tablet Take 2 tablets by mouth 2 times daily for 7 days 20 Yes Sanjay Campos MD   apixaban (ELIQUIS) 5 MG TABS tablet Take 1 tablet by mouth 2 times daily 20  Yes Anabell Mittal MD   doxycycline monohydrate (ADOXA) 100 MG tablet Take 1 tablet by mouth 2 times daily for 10 days 7/24/20 8/3/20  Baron Brenden DO   ibuprofen (ADVIL;MOTRIN) 600 MG tablet Take 1 tablet by mouth 4 times daily as needed for Pain 7/16/20 7/30/20  Darling Jang, DO   diclofenac (VOLTAREN) 50 MG EC tablet Take 1 tablet by mouth 2 times daily 4/6/20   Alyssia Lugo,    amitriptyline (ELAVIL) 10 MG tablet Take 1 tablet by mouth nightly 1/31/20   Kishore Wu, DO   atorvastatin (LIPITOR) 40 MG tablet Take 1 tablet by mouth nightly 1/31/20   Kishore Wu, DO   butalbital-acetaminophen-caffeine (FIORICET, ESGIC) -40 MG per tablet Take 1 tablet by mouth every 4 hours as needed for Headaches 1/31/20   Kishore Wu, DO   clopidogrel (PLAVIX) 75 MG tablet Take 1 tablet by mouth daily 2/1/20   Annel Andino, DO   albuterol sulfate HFA (PROVENTIL HFA) 108 (90 Base) MCG/ACT inhaler Inhale 1-2 puffs into the lungs once for 1 dose 1/9/20 6/15/20  Nathalie Freitas MD   ondansetron (ZOFRAN ODT) 4 MG disintegrating tablet Take 1 tablet by mouth every 8 hours as needed for Nausea 9/9/19   Lady Melissa PA-C   docusate sodium (COLACE, DULCOLAX) 100 MG CAPS Take 100 mg by mouth 2 times daily 8/22/19   Aristeo Handy MD   Misc.  Devices [facility]) MISC 1 Device by Does not apply route once for 1 dose 8/5/19 8/5/19  Lady Melissa PA-C   hydrOXYzine (ATARAX) 25 MG tablet Take 1 tablet by mouth every 8 hours as needed for Itching 8/5/19   Lady Melissa PA-C   benzonatate (TESSALON PERLES) 100 MG capsule Take 1 capsule by mouth 3 times daily as needed for Cough 3/11/19   Dane Lopez, DO   carvedilol (COREG) 6.25 MG tablet Take 1 tablet by mouth 2 times daily (with meals) 2/7/19   Miley Knowles MD   ferrous sulfate (MELISSA-HAO) 325 (65 Fe) MG tablet Take 1 tablet by mouth daily 2/7/19   Miley Knowles MD   melatonin 1 MG tablet Take 1 tablet by mouth nightly as needed for Sleep 1/11/19   Sury Mathis MD   ipratropium-albuterol (DUONEB) 0.5-2.5 (3) MG/3ML SOLN nebulizer solution Inhale 3 mLs into the lungs every 4 hours as needed for Shortness of Breath 1/11/19   Sury Mathis MD   mometasone-formoterol [facility]) 100-5 MCG/ACT inhaler Inhale 2 puffs into the lungs 2 times daily 12/5/18   Rose Marie Irizarry MD   pantoprazole (PROTONIX) 40 MG tablet Take 1 tablet by mouth 2 times daily (before meals) 11/13/18   Moises Jimenez MD   folic acid (FOLVITE) 1 MG tablet Take 1 tablet by mouth daily 10/13/18   Donna Sears MD   vitamin B-1 100 MG tablet Take 1 tablet by mouth daily 10/13/18   Donna Sears MD   vitamin B-12 250 MCG tablet Take 1 tablet by mouth daily 10/13/18   Donna Sears MD   gabapentin (NEURONTIN) 300 MG capsule Take 300 mg by mouth 3 times daily. Barney Beverly Historical Provider, MD   Multiple Vitamins-Minerals (THERAPEUTIC MULTIVITAMIN-MINERALS) tablet Take 1 tablet by mouth daily 8/12/18   Isa Rodas MD     .  Current Medications:  Scheduled Meds:   pantoprazole  40 mg Intravenous BID    And    sodium chloride (PF)  10 mL Intravenous BID    carvedilol  12.5 mg Oral BID WC    iron sucrose  200 mg Intravenous Q24H    haloperidol lactate  2 mg Intramuscular Once    ipratropium-albuterol  1 ampule Inhalation TID    apixaban  10 mg Oral BID    Followed by   Juan Quintanilla ON 8/7/2020] apixaban  5 mg Oral BID    levoFLOXacin  750 mg Oral Daily    sodium chloride flush  10 mL Intravenous 2 times per day    atorvastatin  40 mg Oral Nightly    clopidogrel  75 mg Oral Daily    thiamine  100 mg Oral Daily    sodium chloride flush  10 mL Intravenous 2 times per day    nicotine  1 patch Transdermal Daily    therapeutic multivitamin-minerals  1 tablet Oral Daily    budesonide-formoterol  2 puff Inhalation BID     . Continuous Infusions:   lactated ringers      dextrose       .   PRN Meds:glucose, dextrose, glucagon (rDNA), dextrose, sodium chloride flush, acetaminophen, albuterol, benzonatate, butalbital-acetaminophen-caffeine, melatonin, sodium chloride flush, acetaminophen **OR** acetaminophen, polyethylene glycol, promethazine **OR** ondansetron, potassium chloride **OR** potassium alternative oral replacement **OR** potassium chloride    REVIEW OF SYSTEMS:     Constitutional: No fever, no chills, no lethargy, no weakness, no weight loss  HEENT:  No headache, otalgia, itchy eyes, nasal discharge or sore throat. Cardiac:  No chest pain, dyspnea, orthopnea or PND. Chest:   No cough, phlegm or wheezing. Abdomen:  No abdominal pain, nausea, vomiting, constipation, diarrhea, hematochezia/melena  Neuro:  No focal weakness, abnormal movements or seizure like activity. Skin:   No rashes, no itching. :   No hematuria, no pyuria, no dysuria, no flank pain. Extremities:  No swelling or joint pains. ROS was otherwise negative except as mentioned in the [address] PHYSICAL EXAM:    BP (!) 162/84   Pulse 88   Temp 98.9 °F (37.2 °C) (Axillary)   Resp 23   Ht 4' 11\" (1.499 m)   Wt 97 lb 14.2 oz (44.4 kg)   LMP 03/18/2015   SpO2 96%   BMI 19.77 kg/m²   . TMAX[24]    General: uncooperative  Head:  Normocephalic, Atraumatic  EENT: EOMI, Sclera not icteric, Oropharynx moist  Neck:  Supple, Trachea midline  Lungs:CTA Bilaterally  Heart: RRR, No murmur, No rub, No gallop, PMI nondisplaced. Abdomen:Soft, Non tender, Not distended, BS WNL,  No masses. No hepatomegalia   Ext:No clubbing. No cyanosis. No edema. Skin: No rashes. No jaundice. No stigmata of liver disease. Neuro:  A&O x Two, No focal neurological deficits    Labs and Imaging:     Hemotological labs:    Anemia studies:  Recent Labs     07/31/20  1610   LABIRON 14*   TIBC 249*   FERRITIN 138   [id number] 379   FOLATE 16.7       CBC:  Recent Labs     07/30/20  1619 07/31/20  0340 08/01/20  0605   WBC 12.5* 11.9* 10.1   HGB 13.0 11.8* 9.9*   .7 104.7* 99.7   RDW 17.0* 17.2* 16.8*   * 734* 685* PT/INR:  Recent Labs     07/30/20  2016   PROTIME 10.2   INR 1.0       BMP:  Recent Labs     07/30/20  1619 07/31/20  0340 08/01/20  0605    139 138   K 4.0 4.1 3.4*    107 113*   CO2 16* 12* 16*   BUN 26* 20 12   CREATININE 1.37* 0.80 0.62   GLUCOSE 94 48* 86   CALCIUM 9.9 9.3 8.6       Liver work up:  Hepatitis Functional Panel:  Recent Labs     07/30/20  1619   ALKPHOS 71   ALT 8   AST 19   PROT 8.4*   BILITOT 0.38   LABALBU 3.5       Amylase/Lipase/Ammonia:  No results for input(s): AMYLASE, LIPASE, AMMONIA in the last 72 hours. Acute Hepatitis Panel:  Lab Results   Component Value Date    HEPBSAG NONREACTIVE 06/23/2017    HEPCAB REACTIVE 10/09/2017    HEPBIGM NONREACTIVE 09/14/2015    HEPAIGM NONREACTIVE 09/14/2015       Cancer Markers:  CEA:    No results for input(s): CEA in the last 72 hours. Ca 125:   No results for input(s):  in the last 72 hours. Ca 19-9:     Invalid input(s):   AFP: No results for input(s): AFP in the last 72 hours. Principal Problem:    Pulmonary embolism on right [facility])  Active Problems:    Essential hypertension    COPD (chronic obstructive pulmonary disease) (HCC)    Pneumonia    Protein calorie malnutrition (HCC)    Multiple subsegmental pulmonary emboli without acute cor pulmonale  Resolved Problems:    * No resolved hospital problems. *       Assessment and plan of care:   1. DAMIAN in the setting of [facility] [place] for acute PE. Pt was last seen 9/2019 and she refused endoscopies. At this time she is again refusing endoscopies and most nursing care stating she is going home tomorrow.     -We would recommend EGD/colonoscopy on Monday if pt is still in the hospital and willing to be cooperative with the prep and procedures.   -Rec iron supplements  -Trend daily labs  -Monitor for active bleeding  -Diet as tolerated  -Will follow in am    This plan was formulated in collaboration with Dr. Jayson Cavanaugh MD    Electronically signed by:  Royal Godfrey LAYNE, [facility]  [phone]  8/1/2020    2:51 PM
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
## 2020-08-01 NOTE — PROGRESS NOTES
[facility]  Internal Medicine Teaching Residency Program  Inpatient Daily Progress Note  ______________________________________________________________________________    Patient: Oni Reyna  YOB: 1955   CRN:[id number]    Acct: [de-identified]     Room: [facility]  Admit date: 7/30/2020  Today's date: 08/01/20  Number of days in the hospital: 2    SUBJECTIVE   Admitting Diagnosis: Pulmonary embolism on right [facility])  CC: Headache, Cough, Shortness of Breath  Pt examined at bedside. Chart & results reviewed. Episode of confusion overnight reported  Appears alert and oriented this morning  Pt is heme stable and afebrile  Comfortably sitting in bed  No signs of active bleeding    ROS:  Constitutional:  negative for chills, fevers, sweats  Respiratory:  negative for cough, dyspnea on exertion, hemoptysis, shortness of breath, wheezing  Cardiovascular:  negative for chest pain, chest pressure/discomfort, lower extremity edema, palpitations  Gastrointestinal:  negative for abdominal pain, constipation, diarrhea, nausea, vomiting  Neurological:  negative for dizziness, headache    BRIEF HISTORY     The patient is a pleasant 65 y.o. female with PMH of COPD, migraine, h/o CVA, chronic diastolic congestive heart failure, right hip replacement s/p avascular necrosis presents with a chief complaint of headache, cough and shortness of breath. She visited ED twice in the past week for the headache and was told that this is due to migraine and was asked to to follow-up outpatient. Her headache started again today and is worsening with retro-orbital pain.  She was desaturating on presentation to the low 90s and has cough with SOB.  She reports that this cough and SOB is there for a while now.  She was drowsy likely due to Benadryl and Compazine and was unable to provide proper history.     In the ER, she was hypoxic and requiring 3 L oxygen through nasal cannula.  She was given Benadryl and compazine for headache.  proBNP 1101, troponin 19, bicarb 16, anion gap 18, WBC 12.5. CXR shows left hilar masslike opacity with worsening atelectasis involving the left lower lobe.  CT chest was recommended and it shows few scattered small segmental, subsegmental PE involving right lower lobe.  Masslike opacity involving left lower lobe favor pneumonia.  She was given a dose of azithromycin 500mg, dose of heparin and a 500 mL bolus of normal saline.      Past medical history significant for:     COPD> chronic smoker, currently 1 pack/day for 30 years.  Currently on DuoNeb, albuterol, Dulera. PFTs 2018>restrictive. FEV1 at 53% predicted, FVC 55% predicted.  No bronchodilator change. FEV1/FVC ratio is 76.  [id number] is 40% predicted. she was advised complete smoking cessation, oxygen use 24 hours a day at 2 L, get PFTs, and to get yearly CT scan screening.  But she is noncompliant follow-up.     Diastolic heart KZHKLJO> 85/24 ECHO> hyperdynamic EF 69%, mild grade 1 diastolic dysfunction, mild LVH-basal septal hypertrophy.     History of hepatitis C in the past with GI bleed> EGD on 2018 for melena>1 CM ulcer in duodenum at bulb/D2 with bleeding and pulsating visible vessel - tx with epi and endo clips.  Currently on pantoprazole 40 mg twice daily       RIGHT HIP TOTAL ARTHROPLASTY 2019>s/p avascular necrosis of replaced hip.  In the past weeks, she visited ED multiple times for hip pain with 1 admission and was advised to follow-up outpatient with orthopedics but she did not.     2020 Left posterior frontal lobe infarct on MRI> currently on Plavix 75 mg and Lipitor.  Cannot tolerate aspirin.     OBJECTIVE     Vital Signs:  BP (!) 162/84   Pulse 88   Temp 98.9 °F (37.2 °C) (Axillary)   Resp 23   Ht 4' 11\" (1.499 m)   Wt 97 lb 14.2 oz (44.4 kg)   LMP 2015   SpO2 96%   BMI 19.77 kg/m²     Temp (24hrs), Av.5 °F (36.9 °C), Min:97.1 °F (36.2 °C), Max:99 °F (37.2 °C)    In: 2660.6   Out: -     Physical Exam:  Constitutional: This is a well developed, well nourished, 18.5-24.9 - Normal 77y.o. year old female who is alert, oriented, cooperative and in no apparent distress.     Respiratory: Breath sounds bilaterally were clear to auscultation  Cardiovascular: Regular without murmur  Abdomen: Soft and Nontender   Extremities:  No lower extremity edema  Neurological/Psychiatric: The patient's general behavior, level of consciousness, thought content and emotional status is normal.        Medications:  Scheduled Medications:    potassium chloride  40 mEq Oral Once    pantoprazole  40 mg Intravenous BID    And    sodium chloride (PF)  10 mL Intravenous BID    carvedilol  12.5 mg Oral BID WC    iron sucrose  200 mg Intravenous Q24H    ipratropium-albuterol  1 ampule Inhalation TID    apixaban  10 mg Oral BID    Followed by   Wayne Causey ON 8/7/2020] apixaban  5 mg Oral BID    levoFLOXacin  750 mg Oral Daily    sodium chloride flush  10 mL Intravenous 2 times per day    atorvastatin  40 mg Oral Nightly    clopidogrel  75 mg Oral Daily    thiamine  100 mg Oral Daily    sodium chloride flush  10 mL Intravenous 2 times per day    nicotine  1 patch Transdermal Daily    therapeutic multivitamin-minerals  1 tablet Oral Daily    budesonide-formoterol  2 puff Inhalation BID     Continuous Infusions:    lactated ringers      dextrose       PRN Medicationsglucose, 15 g, PRN  dextrose, 12.5 g, PRN  glucagon (rDNA), 1 mg, PRN  dextrose, 100 mL/hr, PRN  sodium chloride flush, 10 mL, PRN  acetaminophen, 650 mg, Q4H PRN  albuterol, 2.5 mg, As Directed RT PRN  benzonatate, 100 mg, TID PRN  butalbital-acetaminophen-caffeine, 1 tablet, Q4H PRN  melatonin, 1 mg, Nightly PRN  sodium chloride flush, 10 mL, PRN  acetaminophen, 650 mg, Q6H PRN    Or  acetaminophen, 650 mg, Q6H PRN  polyethylene glycol, 17 g, Daily PRN  promethazine, 12.5 mg, Q6H PRN    Or  ondansetron, 4 mg, Q6H PRN  potassium Loss Anemia - Hemoglobin 9.9 on 7/31 (13.0 and then 11.8 on 7/30/2020). Hemoglobin and Hematocrit ordered. Will continue to monitor. GI consulted. Protonix 40 mg twice daily     Segmental, subsegmental lower lobe pulmonary embolism - Recommendations from Pulmonology appreciated. Currently on Eliquis 10 mg BID for 7 days. Continue Eliquis 5 mg BID after. Respiratory Failure Secondary to PE and Underlying COPD - Resoled. 99% O2 Saturation on 2 liters Nasal Canula. Continue Levaquin 750 mg PO Daily. Recommendations from Pulmonology appreciated. Left lower lobe pneumonia vs malignancy - May need to repeat CT chest 4-6 weeks after resolution of pneumonia to see if there is any underlying mass. Will work up for any colonic malignancy as outpatient.      Chronic diastolic heart failure - EF 69% on echo from 11/19. Echo ordered and in process. COPD - Stable.  Resume DuoNeb, Albuterol, Symbicort and oxygen titration for goal SaO2 88-92%     History of drug abuse - Urine toxicology positive for Barbituates and Oxycodone     Headache - Resolved    DVT ppx : Eliquis  GI ppx: Protonix    PT/OT/SW:  On Board   Discharge Planning:  Ongoing pending GI Recommendations    Savage Lynne MD  Internal Medicine Resident, PGY-1  [address] [facility];  [place], [place]  8/1/2020, 12:47 PM

## 2020-08-02 VITALS
RESPIRATION RATE: 21 BRPM | SYSTOLIC BLOOD PRESSURE: 156 MMHG | WEIGHT: 97.88 LBS | HEART RATE: 107 BPM | TEMPERATURE: 98.2 F | HEIGHT: 59 IN | DIASTOLIC BLOOD PRESSURE: 85 MMHG | BODY MASS INDEX: 19.73 KG/M2 | OXYGEN SATURATION: 94 %

## 2020-08-02 PROBLEM — J44.1 COPD EXACERBATION (HCC): Status: RESOLVED | Noted: 2019-11-13 | Resolved: 2020-08-02

## 2020-08-02 PROBLEM — D62 ACUTE ON CHRONIC BLOOD LOSS ANEMIA: Chronic | Status: ACTIVE | Noted: 2020-08-02

## 2020-08-02 PROBLEM — J44.9 COPD (CHRONIC OBSTRUCTIVE PULMONARY DISEASE) (HCC): Chronic | Status: ACTIVE | Noted: 2018-11-04

## 2020-08-02 PROBLEM — R13.19 OTHER DYSPHAGIA: Status: RESOLVED | Noted: 2019-11-14 | Resolved: 2020-08-02

## 2020-08-02 PROBLEM — Z86.19 HISTORY OF HEPATITIS C: Chronic | Status: ACTIVE | Noted: 2019-08-11

## 2020-08-02 PROBLEM — D75.839 THROMBOCYTOSIS: Chronic | Status: ACTIVE | Noted: 2019-08-13

## 2020-08-02 LAB
ABSOLUTE EOS #: 0.32 K/UL (ref 0–0.4)
ABSOLUTE IMMATURE GRANULOCYTE: 0.53 K/UL (ref 0–0.3)
ABSOLUTE LYMPH #: 2.21 K/UL (ref 1–4.8)
ABSOLUTE MONO #: 0.74 K/UL (ref 0.1–0.8)
ANION GAP SERPL CALCULATED.3IONS-SCNC: 14 MMOL/L (ref 9–17)
BASOPHILS # BLD: 0 % (ref 0–2)
BASOPHILS ABSOLUTE: 0 K/UL (ref 0–0.2)
BUN BLDV-MCNC: 6 MG/DL (ref 8–23)
BUN/CREAT BLD: ABNORMAL (ref 9–20)
CALCIUM SERPL-MCNC: 8.9 MG/DL (ref 8.6–10.4)
CHLORIDE BLD-SCNC: 107 MMOL/L (ref 98–107)
CO2: 17 MMOL/L (ref 20–31)
CREAT SERPL-MCNC: 0.51 MG/DL (ref 0.5–0.9)
CULTURE: NORMAL
DIFFERENTIAL TYPE: ABNORMAL
EOSINOPHILS RELATIVE PERCENT: 3 % (ref 1–4)
GFR AFRICAN AMERICAN: >60 ML/MIN
GFR NON-AFRICAN AMERICAN: >60 ML/MIN
GFR SERPL CREATININE-BSD FRML MDRD: ABNORMAL ML/MIN/{1.73_M2}
GFR SERPL CREATININE-BSD FRML MDRD: ABNORMAL ML/MIN/{1.73_M2}
GLUCOSE BLD-MCNC: 165 MG/DL (ref 70–99)
HCT VFR BLD CALC: 40.1 % (ref 36.3–47.1)
HEMOGLOBIN: 11.9 G/DL (ref 11.9–15.1)
IMMATURE GRANULOCYTES: 5 %
LYMPHOCYTES # BLD: 21 % (ref 24–44)
Lab: NORMAL
MAGNESIUM: 1.6 MG/DL (ref 1.6–2.6)
MCH RBC QN AUTO: 30.7 PG (ref 25.2–33.5)
MCHC RBC AUTO-ENTMCNC: 29.7 G/DL (ref 28.4–34.8)
MCV RBC AUTO: 103.4 FL (ref 82.6–102.9)
MONOCYTES # BLD: 7 % (ref 1–7)
MORPHOLOGY: ABNORMAL
MORPHOLOGY: ABNORMAL
NRBC AUTOMATED: 0 PER 100 WBC
PDW BLD-RTO: 17 % (ref 11.8–14.4)
PLATELET # BLD: 607 K/UL (ref 138–453)
PLATELET ESTIMATE: ABNORMAL
PMV BLD AUTO: 8.4 FL (ref 8.1–13.5)
POTASSIUM SERPL-SCNC: 3.1 MMOL/L (ref 3.7–5.3)
RBC # BLD: 3.88 M/UL (ref 3.95–5.11)
RBC # BLD: ABNORMAL 10*6/UL
SEG NEUTROPHILS: 64 % (ref 36–66)
SEGMENTED NEUTROPHILS ABSOLUTE COUNT: 6.7 K/UL (ref 1.8–7.7)
SODIUM BLD-SCNC: 138 MMOL/L (ref 135–144)
SPECIMEN DESCRIPTION: NORMAL
WBC # BLD: 10.5 K/UL (ref 3.5–11.3)
WBC # BLD: ABNORMAL 10*3/UL

## 2020-08-02 PROCEDURE — 2580000003 HC RX 258: Performed by: STUDENT IN AN ORGANIZED HEALTH CARE EDUCATION/TRAINING PROGRAM

## 2020-08-02 PROCEDURE — 85025 COMPLETE CBC W/AUTO DIFF WBC: CPT

## 2020-08-02 PROCEDURE — 6370000000 HC RX 637 (ALT 250 FOR IP): Performed by: STUDENT IN AN ORGANIZED HEALTH CARE EDUCATION/TRAINING PROGRAM

## 2020-08-02 PROCEDURE — 83735 ASSAY OF MAGNESIUM: CPT

## 2020-08-02 PROCEDURE — 80048 BASIC METABOLIC PNL TOTAL CA: CPT

## 2020-08-02 PROCEDURE — 36415 COLL VENOUS BLD VENIPUNCTURE: CPT

## 2020-08-02 RX ORDER — AMITRIPTYLINE HYDROCHLORIDE 10 MG/1
10 TABLET, FILM COATED ORAL NIGHTLY
Status: DISCONTINUED | OUTPATIENT
Start: 2020-08-02 | End: 2020-08-02 | Stop reason: HOSPADM

## 2020-08-02 RX ORDER — LEVOFLOXACIN 750 MG/1
750 TABLET ORAL DAILY
Qty: 5 TABLET | Refills: 0 | Status: SHIPPED | OUTPATIENT
Start: 2020-08-03 | End: 2020-08-08

## 2020-08-02 RX ORDER — DOCUSATE SODIUM 100 MG/1
100 CAPSULE, LIQUID FILLED ORAL 2 TIMES DAILY
Qty: 28 CAPSULE | Refills: 0 | Status: SHIPPED | OUTPATIENT
Start: 2020-08-02 | End: 2020-08-16

## 2020-08-02 RX ORDER — SENNOSIDES 8.6 MG
1 TABLET ORAL DAILY
Qty: 14 TABLET | Refills: 0 | Status: SHIPPED | OUTPATIENT
Start: 2020-08-02 | End: 2020-08-16

## 2020-08-02 RX ADMIN — LEVOFLOXACIN 750 MG: 750 TABLET, FILM COATED ORAL at 08:40

## 2020-08-02 RX ADMIN — SODIUM CHLORIDE, PRESERVATIVE FREE 10 ML: 5 INJECTION INTRAVENOUS at 08:40

## 2020-08-02 RX ADMIN — CARVEDILOL 12.5 MG: 12.5 TABLET, FILM COATED ORAL at 08:40

## 2020-08-02 RX ADMIN — MULTIPLE VITAMINS W/ MINERALS TAB 1 TABLET: TAB at 08:40

## 2020-08-02 RX ADMIN — Medication 10 ML: at 08:40

## 2020-08-02 RX ADMIN — CLOPIDOGREL 75 MG: 75 TABLET, FILM COATED ORAL at 08:41

## 2020-08-02 RX ADMIN — APIXABAN 10 MG: 5 TABLET, FILM COATED ORAL at 08:40

## 2020-08-02 RX ADMIN — Medication 100 MG: at 08:40

## 2020-08-02 RX ADMIN — BENZONATATE 100 MG: 100 CAPSULE ORAL at 06:54

## 2020-08-02 RX ADMIN — BUTALBITAL, ACETAMINOPHEN AND CAFFEINE 1 TABLET: 50; 325; 40 TABLET ORAL at 10:22

## 2020-08-02 ASSESSMENT — PAIN SCALES - GENERAL: PAINLEVEL_OUTOF10: 0

## 2020-08-02 NOTE — PROGRESS NOTES
CLINICAL PHARMACY NOTE: MEDS TO [address] Select Patient?: No  Total # of Prescriptions Filled: 4   The following medications were delivered to the patient:  · Colace  · Senna  · Levofloxacin  · Eliquis  Total # of Interventions Completed: 0  Time Spent (min): 5    Additional Documentation: meds delivered to patient 8/2 at 11:14am

## 2020-08-02 NOTE — PROGRESS NOTES
Physical Therapy  DATE: 2020    NAME: Sachin Alcala  MRN: [id number]   : 1955    Patient not seen this date for Physical Therapy due to:  [] Blood transfusion in progress  [] Hemodialysis  []  Patient Declined  [] Spine Precautions   [] Strict Bedrest  [] Surgery/ Procedure  [] Testing      [x] Other RN cancel PT today due to pt being combative and scheduled to go home today. Recheck on 8. 3.20 if pt is still in hosp       [] PT being discontinued at this time. Patient independent. No further needs. [] PT being discontinued at this time as the patient has been transferred to palliative care. No further needs.     Tali Christianson, PT

## 2020-08-02 NOTE — PROGRESS NOTES
[facility]  Internal Medicine Teaching Residency Program  Inpatient Daily Progress Note  ______________________________________________________________________________    Patient: Trish Garcia  YOB: 1955   [facility]:[id number]    Acct: [de-identified]     Room: [facility]042-02  Admit date: 7/30/2020  Today's date: 08/02/20  Number of days in the hospital: 3    SUBJECTIVE   Admitting Diagnosis: Pulmonary embolism on right [facility])  CC: Headache, Cough, Shortness of Breath  Pt examined at bedside. Chart & results reviewed. Oriented x3, slept well, tolerating oral diet. Tmax of 100.6, HR 90s, /80 mmhg  WBC normal, Hb 11.9, thrombocytosis of 607K  Macrocytic anemia, low Iron. Peripheral smear pending'      ROS:  Constitutional:  negative for chills, fevers, sweats  Respiratory:  negative for cough, dyspnea on exertion, hemoptysis, shortness of breath, wheezing  Cardiovascular:  negative for chest pain, chest pressure/discomfort, lower extremity edema, palpitations  Gastrointestinal:  negative for abdominal pain, constipation, diarrhea, nausea, vomiting  Neurological:  negative for dizziness, headache    BRIEF HISTORY     The patient is a pleasant 65 y.o. female with PMH of COPD, migraine, h/o CVA, chronic diastolic congestive heart failure, right hip replacement s/p avascular necrosis presents with a chief complaint of headache, cough and shortness of breath. She visited ED twice in the past week for the headache and was told that this is due to migraine and was asked to to follow-up outpatient. Her headache started again today and is worsening with retro-orbital pain.  She was desaturating on presentation to the low 90s and has cough with SOB.  She reports that this cough and SOB is there for a while now.  She was drowsy likely due to Benadryl and Compazine and was unable to provide proper history.     In the ER, she was hypoxic and requiring 3 L oxygen through nasal cannula.  She was given Benadryl and compazine for headache.  proBNP 1101, troponin 19, bicarb 16, anion gap 18, WBC 12.5. CXR shows left hilar masslike opacity with worsening atelectasis involving the left lower lobe.  CT chest was recommended and it shows few scattered small segmental, subsegmental PE involving right lower lobe.  Masslike opacity involving left lower lobe favor pneumonia.  She was given a dose of azithromycin 500mg, dose of heparin and a 500 mL bolus of normal saline.      Past medical history significant for:     COPD> chronic smoker, currently 1 pack/day for 30 years.  Currently on DuoNeb, albuterol, Dulera. PFTs 2018>restrictive. FEV1 at 53% predicted, FVC 55% predicted.  No bronchodilator change. FEV1/FVC ratio is 76.  [id number] is 40% predicted. she was advised complete smoking cessation, oxygen use 24 hours a day at 2 L, get PFTs, and to get yearly CT scan screening.  But she is noncompliant follow-up.     Diastolic heart KWYFOOA>  ECHO> hyperdynamic EF 69%, mild grade 1 diastolic dysfunction, mild LVH-basal septal hypertrophy.     History of hepatitis C in the past with GI bleed> EGD on 2018 for melena>1 CM ulcer in duodenum at bulb/D2 with bleeding and pulsating visible vessel - tx with epi and endo clips.  Currently on pantoprazole 40 mg twice daily       RIGHT HIP TOTAL ARTHROPLASTY 2019>s/p avascular necrosis of replaced hip.  In the past weeks, she visited ED multiple times for hip pain with 1 admission and was advised to follow-up outpatient with orthopedics but she did not.     2020 Left posterior frontal lobe infarct on MRI> currently on Plavix 75 mg and Lipitor.  Cannot tolerate aspirin.     OBJECTIVE     Vital Signs:  BP (!) 153/90   Pulse 87   Temp 99.7 °F (37.6 °C) (Temporal)   Resp 19   Ht 4' 11\" (1.499 m)   Wt 97 lb 14.2 oz (44.4 kg)   LMP 2015   SpO2 92%   BMI 19.77 kg/m²     Temp (24hrs), Av.7 °F (37.6 °C), Min:98.9 °F (37.2 °C), Max:100.6 °F (38.1 °C)    In: 240   Out: -     Physical Exam:  Constitutional: This is a well developed, well nourished, 18.5-24.9 - Normal 77y.o. year old female who is alert, oriented, cooperative and in no apparent distress.     Respiratory: Breath sounds bilaterally were clear to auscultation  Cardiovascular: Regular without murmur  Abdomen: Soft and Nontender   Extremities:  No lower extremity edema  Neurological/Psychiatric: The patient's general behavior, level of consciousness, thought content and emotional status is normal.        Medications:  Scheduled Medications:    [Held by provider] pantoprazole  40 mg Intravenous BID    And    [Held by provider] sodium chloride (PF)  10 mL Intravenous BID    carvedilol  12.5 mg Oral BID WC    [Held by provider] iron sucrose  200 mg Intravenous Q24H    pantoprazole  40 mg Oral BID AC    ipratropium-albuterol  1 ampule Inhalation TID    apixaban  10 mg Oral BID    Followed by   Dane Curran ON 8/7/2020] apixaban  5 mg Oral BID    levoFLOXacin  750 mg Oral Daily    sodium chloride flush  10 mL Intravenous 2 times per day    atorvastatin  40 mg Oral Nightly    clopidogrel  75 mg Oral Daily    thiamine  100 mg Oral Daily    sodium chloride flush  10 mL Intravenous 2 times per day    nicotine  1 patch Transdermal Daily    therapeutic multivitamin-minerals  1 tablet Oral Daily    budesonide-formoterol  2 puff Inhalation BID     Continuous Infusions:    [Held by provider] lactated ringers      dextrose       PRN Medicationsglucose, 15 g, PRN  dextrose, 12.5 g, PRN  glucagon (rDNA), 1 mg, PRN  dextrose, 100 mL/hr, PRN  sodium chloride flush, 10 mL, PRN  acetaminophen, 650 mg, Q4H PRN  albuterol, 2.5 mg, As Directed RT PRN  benzonatate, 100 mg, TID PRN  butalbital-acetaminophen-caffeine, 1 tablet, Q4H PRN  melatonin, 1 mg, Nightly PRN  sodium chloride flush, 10 mL, PRN  acetaminophen, 650 mg, Q6H PRN    Or  acetaminophen, 650 mg, Q6H PRN  polyethylene glycol, 17 g, Daily PRN  promethazine, 12.5 mg, Q6H PRN    Or  ondansetron, 4 mg, Q6H PRN  potassium chloride, 40 mEq, PRN    Or  potassium alternative oral replacement, 40 mEq, PRN    Or  potassium chloride, 10 mEq, PRN      Diagnostic Labs:  CBC:   Recent Labs     07/31/20  0340 08/01/20  0605 08/01/20  1611 08/02/20  0723   WBC 11.9* 10.1  --  10.5   RBC 3.80* 3.23*  --  3.88*   HGB 11.8* 9.9* 9.6* 11.9   HCT 39.8 32.2* 29.5* 40.1   .7* 99.7  --  103.4*   RDW 17.2* 16.8*  --  17.0*   * 685*  --  607*     BMP:   Recent Labs     07/30/20  1619 07/31/20  0340 08/01/20  0605    139 138   K 4.0 4.1 3.4*    107 113*   CO2 16* 12* 16*   BUN 26* 20 12   CREATININE 1.37* 0.80 0.62     PT/INR:   Recent Labs     07/30/20 2016   PROTIME 10.2   INR 1.0     APTT:   Recent Labs     07/30/20 2016 07/31/20  0616   APTT 23.7 >120.0*     LIVER PROFILE:   Recent Labs     07/30/20  1619   AST 19   ALT 8   BILITOT 0.38   ALKPHOS 70      MICROBIOLOGY:   Lab Results   Component Value Date/Time    CULTURE NO GROWTH 2 DAYS 07/30/2020 11:11 PM     Imaging:    Xr Chest Portable    Result Date: 7/30/2020  Left hilar masslike opacity with worsening atelectasis involving the left lower lobe compared to the prior study from July 27th. Malignancy is suspected with possible developing compression involving the left lower lobe bronchus. Developing superimposed pneumonia cannot be excluded. Further evaluation with CT is recommended. Xr Chest Portable    Result Date: 7/27/2020  Masslike left hilar opacity for which contrast-enhanced chest CT is recommended to evaluate the possibility of a neoplasm versus masslike consolidation. Ct Chest Pulmonary Embolism W Contrast    Result Date: 7/30/2020  Few scattered small segmental subsegmental PE identified involving right lower lobe. Masslike opacity involving left lower lobe favor pneumonia. Follow-up to assure resolution following medical treatment course recommended.  Critical results were called by Dr. Cinda Price to [facility] on 7/30/2020 at 18:45. ASSESSMENT & PLAN     ASSESSMENT / PLAN:     Acute on chronic Blood Loss Anemia: DAMIAN and currently on anticoagulation. No signs of bleeding. Previous GI follow up and refused scoping. This admission, GI evaluated, refusing procedures as well. Oral PPI, oral diet.     Segmental, subsegmental Rt lower lobe pulmonary embolism - Recommendations from Pulmonology appreciated. Currently on Eliquis 10 mg BID for 7 days. Continue Eliquis 5 mg BID after. LE DVT scan negative. Acute Respiratory Failure Secondary to PE and Underlying COPD - Resoled. Currently on room air. Continue Levaquin 750 mg PO Daily for total of 7 days. Keep saturation 88-92 %  Wean of NC as able. Left lower lobe pneumonia vs malignancy -Need to repeat CT chest 4-6 weeks after resolution of pneumonia to see if there is any underlying mass.     Chronic CHFpEF: stable    COPD - Stable.  Resume DuoNeb, Albuterol, Symbicort and oxygen titration for goal SaO2 88-92%     History of drug abuse - Urine toxicology positive for Barbituates and Oxycodone       DVT ppx : Eliquis  GI ppx: Protonix    PT/OT/SW:  On Board   Discharge Planning:   Extensive discussion on GI work up importance, however patient adamantly refusing and wishing to go home. She verbalized understanding of following up with GI as outpatient and PCP in 1 week. Will discharge the pt. Alexsandra Garcia MD  Internal Medicine Resident, PGY-2  [address], [place]  8/2/2020, 7:58 AM

## 2020-08-02 NOTE — PLAN OF CARE
Problem: Pain:  Goal: Pain level will decrease  Description: Pain level will decrease  Outcome: Ongoing     Problem: Pain:  Goal: Control of acute pain  Description: Control of acute pain  Outcome: Ongoing     Problem: Pain:  Goal: Control of chronic pain  Description: Control of chronic pain  Outcome: Ongoing     Problem: Skin Integrity:  Goal: Will show no infection signs and symptoms  Description: Will show no infection signs and symptoms  Outcome: Ongoing     Problem: Skin Integrity:  Goal: Absence of new skin breakdown  Description: Absence of new skin breakdown  Outcome: Ongoing     Problem: Falls - Risk of:  Goal: Will remain free from falls  Description: Will remain free from falls  Outcome: Ongoing     Problem: Falls - Risk of:  Goal: Absence of physical injury  Description: Absence of physical injury  Outcome: Ongoing

## 2020-08-02 NOTE — CARE COORDINATION
Called [employer] spoke [facility], Transportation setup,  time will be set up and they will call nurses station with the  time.    Trip number # [id number]  Phone #[phone]

## 2020-08-02 NOTE — PLAN OF CARE
Problem: Pain:  Goal: Pain level will decrease  Description: Pain level will decrease  8/2/2020 1235 by Noa Guadalupe RN  Outcome: Completed  8/2/2020 0524 by Hailey Hawley RN  Outcome: Ongoing  Goal: Control of acute pain  Description: Control of acute pain  8/2/2020 1235 by Noa Guadalupe RN  Outcome: Completed  8/2/2020 0524 by Hailey Hawley RN  Outcome: Ongoing  Goal: Control of chronic pain  Description: Control of chronic pain  8/2/2020 1235 by Noa Guadalupe RN  Outcome: Completed  8/2/2020 0524 by Hailey Hawley RN  Outcome: Ongoing     Problem: Skin Integrity:  Goal: Will show no infection signs and symptoms  Description: Will show no infection signs and symptoms  8/2/2020 1235 by Noa Guadalupe RN  Outcome: Completed  8/2/2020 0524 by Hailey Hawley RN  Outcome: Ongoing  Goal: Absence of new skin breakdown  Description: Absence of new skin breakdown  8/2/2020 1235 by Noa Guadalupe RN  Outcome: Completed  8/2/2020 0524 by Hailey Hawley RN  Outcome: Ongoing     Problem: Falls - Risk of:  Goal: Will remain free from falls  Description: Will remain free from falls  8/2/2020 1235 by Noa Guadalupe RN  Outcome: Completed  8/2/2020 0524 by Hailey Hawley RN  Outcome: Ongoing  Goal: Absence of physical injury  Description: Absence of physical injury  8/2/2020 1235 by Noa Guadalupe RN  Outcome: Completed  8/2/2020 0524 by Hailey Hawley RN  Outcome: Ongoing     Problem: Restraint Use - Nonviolent/Non-Self-Destructive Behavior:  Goal: Absence of restraint indications  Description: Absence of restraint indications  Outcome: Completed  Goal: Absence of restraint-related injury  Description: Absence of restraint-related injury  Outcome: Completed     Problem: RESPIRATORY  Intervention: Respiratory assessment  8/2/2020 0102 by Francesca Grayson RCP  Note: BRONCHOSPASM/BRONCHOCONSTRICTION    IMPROVE  AERATION/BREATHSOUNDS  ADMINISTER BRONCHODILATOR THERAPY AS APPROPRIATE  ASSESS BREATH SOUNDS  PATIENT EDUCATION AS NEEDED
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
## 2020-08-03 ENCOUNTER — TELEPHONE (OUTPATIENT)
Dept: PULMONOLOGY | Age: 65
End: 2020-08-03

## 2020-08-03 LAB
PATHOLOGIST REVIEW: NORMAL
SURGICAL PATHOLOGY REPORT: NORMAL

## 2020-08-06 LAB
CULTURE: NORMAL
Lab: NORMAL
SPECIMEN DESCRIPTION: NORMAL

## 2020-08-06 NOTE — DISCHARGE SUMMARY
[address]     Department of Internal Medicine - Staff Internal Medicine Teaching Service    INPATIENT DISCHARGE SUMMARY      Patient Identification:  Sammie Arboleda is a 77 y.o. female. :  1955  MRN: [id number]     Acct: [de-identified]   PCP: No primary care provider on file. Admit Date:  2020  Discharge date and time: 2020 12:40 PM   Attending Provider: Dr. Divya Garcia Problem Lists:  Principal Problem:    Pulmonary embolism on right [facility])  Active Problems:    Essential hypertension    COPD (chronic obstructive pulmonary disease) (HCC)    Pneumonia    History of hepatitis C    Thrombocytosis (HCC)    Protein calorie malnutrition ([address]    Multiple subsegmental pulmonary emboli without acute cor pulmonale    Acute on chronic blood loss anemia  Resolved Problems:    * No resolved hospital problems. *      HOSPITAL STAY     Brief Inpatient course:   Sammie Arboleda is a 77 y.o. female who was admitted for the management of Pulmonary embolism on right (HCC),presented to the emergency department with worsening headache, cough, shortness of breath, and headache. Her headache has been present for the past 2 weeks and was located around the retro-orbital area; she even came to the Emergency Room last week for the headache and was told to follow up outpatient. During workup her oxygen saturation levels began to decline and patient was found to have a Segmental/subsegmental Right Lower Lobe Pulmonary embolism. Patient was started on Heparin drip and will be discharged on Eliquis for 3-6 months. Chest X-ray was also positive for a left hilar mass like opacity which could have been malignancy or a pneumonia; she was started on Rocephin and Azithromycin and Pulmonology was consulted. During her stay her hemoglobin levels began to drop from an initial level of 13 to a level of 9 around day 3. clopidogrel (PLAVIX) 75 MG tablet Take 1 tablet by mouth daily, Disp-30 tablet, R-3Normal      albuterol sulfate HFA (PROVENTIL HFA) 108 (90 Base) MCG/ACT inhaler Inhale 1-2 puffs into the lungs once for 1 dose, Disp-1 Inhaler, R-0Print      ondansetron (ZOFRAN ODT) 4 MG disintegrating tablet Take 1 tablet by mouth every 8 hours as needed for Nausea, Disp-6 tablet, R-0Normal      Misc. Devices [facility]) MISC ONCE Starting Mon 8/5/2019, For 1 dose, Disp-1 each, R-0, Print      hydrOXYzine (ATARAX) 25 MG tablet Take 1 tablet by mouth every 8 hours as needed for Itching, Disp-12 tablet, R-0Print      benzonatate (TESSALON PERLES) 100 MG capsule Take 1 capsule by mouth 3 times daily as needed for Cough, Disp-10 capsule, R-0Print      carvedilol (COREG) 6.25 MG tablet Take 1 tablet by mouth 2 times daily (with meals), Disp-60 tablet, R-3Normal      ferrous sulfate (MELISSA-HAO) 325 (65 Fe) MG tablet Take 1 tablet by mouth daily, Disp-30 tablet, R-0Normal      melatonin 1 MG tablet Take 1 tablet by mouth nightly as needed for Sleep, Disp-20 tablet, R-0Normal      ipratropium-albuterol (DUONEB) 0.5-2.5 (3) MG/3ML SOLN nebulizer solution Inhale 3 mLs into the lungs every 4 hours as needed for Shortness of Breath, Disp-360 mL, R-5Normal      mometasone-formoterol (DULERA) 100-5 MCG/ACT inhaler Inhale 2 puffs into the lungs 2 times daily, Disp-1 Inhaler, R-5Normal      pantoprazole (PROTONIX) 40 MG tablet Take 1 tablet by mouth 2 times daily (before meals), Disp-30 tablet, [id number]      folic acid (FOLVITE) 1 MG tablet Take 1 tablet by mouth daily, Disp-30 tablet, R-3Normal      vitamin B-1 100 MG tablet Take 1 tablet by mouth daily, Disp-30 tablet, R-3Normal      vitamin B-12 250 MCG tablet Take 1 tablet by mouth daily, Disp-30 tablet, R-3Normal      gabapentin (NEURONTIN) 300 MG capsule Take 300 mg by mouth 3 times daily. [facility] Med      Multiple Vitamins-Minerals (THERAPEUTIC MULTIVITAMIN-MINERALS) tablet Take 1 tablet by mouth daily, Disp-30 tablet, R-3Normal         STOP taking these medications       doxycycline monohydrate (ADOXA) 100 MG tablet Comments:   Reason for Stopping:               Activity: activity as tolerated    Diet: cardiac diet    Follow-up:    [facility]  [address] [facility]  [phone]    As needed, If symptoms worsen    [address] [facility]  [phone]  In 1 day        Patient Instructions: Please take all medications as prescribed. Please come to the Emergency Room if symptoms worsen. Please follow up with the Walk In [address] [place] [address],[address] tomorrow. Please follow up with Primary Care Provider. Note that over 30 minutes was spent in preparing discharge papers, discussing discharge with patient, medication review, etc.      Corrina Pace MD, MD  Internal Medicine Resident, PGY-1  [address], [place]  8/6/2020, 9:31 AM

## 2020-08-10 ENCOUNTER — APPOINTMENT (OUTPATIENT)
Dept: GENERAL RADIOLOGY | Age: 65
End: 2020-08-10
Payer: MEDICARE

## 2020-08-10 ENCOUNTER — HOSPITAL ENCOUNTER (EMERGENCY)
Age: 65
Discharge: HOME | End: 2020-08-10
Attending: EMERGENCY MEDICINE
Payer: MEDICARE

## 2020-08-10 ENCOUNTER — APPOINTMENT (OUTPATIENT)
Dept: CT IMAGING | Age: 65
End: 2020-08-10
Payer: MEDICARE

## 2020-08-10 VITALS
HEART RATE: 99 BPM | BODY MASS INDEX: 20.76 KG/M2 | DIASTOLIC BLOOD PRESSURE: 76 MMHG | SYSTOLIC BLOOD PRESSURE: 123 MMHG | OXYGEN SATURATION: 94 % | WEIGHT: 103 LBS | TEMPERATURE: 99.4 F | RESPIRATION RATE: 19 BRPM | HEIGHT: 59 IN

## 2020-08-10 LAB
ABSOLUTE EOS #: 0.12 K/UL (ref 0–0.44)
ABSOLUTE IMMATURE GRANULOCYTE: 0.12 K/UL (ref 0–0.3)
ABSOLUTE LYMPH #: 1.77 K/UL (ref 1.1–3.7)
ABSOLUTE MONO #: 0.95 K/UL (ref 0.1–1.2)
ANION GAP SERPL CALCULATED.3IONS-SCNC: 20 MMOL/L (ref 9–17)
BASOPHILS # BLD: 0 % (ref 0–2)
BASOPHILS ABSOLUTE: <0.03 K/UL (ref 0–0.2)
BUN BLDV-MCNC: 18 MG/DL (ref 8–23)
BUN/CREAT BLD: ABNORMAL (ref 9–20)
CALCIUM SERPL-MCNC: 9.5 MG/DL (ref 8.6–10.4)
CHLORIDE BLD-SCNC: 102 MMOL/L (ref 98–107)
CO2: 16 MMOL/L (ref 20–31)
CREAT SERPL-MCNC: 0.78 MG/DL (ref 0.5–0.9)
DIFFERENTIAL TYPE: ABNORMAL
EOSINOPHILS RELATIVE PERCENT: 2 % (ref 1–4)
GFR AFRICAN AMERICAN: >60 ML/MIN
GFR NON-AFRICAN AMERICAN: >60 ML/MIN
GFR SERPL CREATININE-BSD FRML MDRD: ABNORMAL ML/MIN/{1.73_M2}
GFR SERPL CREATININE-BSD FRML MDRD: ABNORMAL ML/MIN/{1.73_M2}
GLUCOSE BLD-MCNC: 84 MG/DL (ref 70–99)
HCT VFR BLD CALC: 42.3 % (ref 36.3–47.1)
HEMOGLOBIN: 13.4 G/DL (ref 11.9–15.1)
IMMATURE GRANULOCYTES: 2 %
LYMPHOCYTES # BLD: 22 % (ref 24–43)
MCH RBC QN AUTO: 30.9 PG (ref 25.2–33.5)
MCHC RBC AUTO-ENTMCNC: 31.7 G/DL (ref 28.4–34.8)
MCV RBC AUTO: 97.7 FL (ref 82.6–102.9)
MONOCYTES # BLD: 12 % (ref 3–12)
NRBC AUTOMATED: 0 PER 100 WBC
PDW BLD-RTO: 18.8 % (ref 11.8–14.4)
PLATELET # BLD: 778 K/UL (ref 138–453)
PLATELET ESTIMATE: ABNORMAL
PMV BLD AUTO: 8.7 FL (ref 8.1–13.5)
POTASSIUM SERPL-SCNC: 3.8 MMOL/L (ref 3.7–5.3)
RBC # BLD: 4.33 M/UL (ref 3.95–5.11)
RBC # BLD: ABNORMAL 10*6/UL
SEG NEUTROPHILS: 63 % (ref 36–65)
SEGMENTED NEUTROPHILS ABSOLUTE COUNT: 5.06 K/UL (ref 1.5–8.1)
SODIUM BLD-SCNC: 138 MMOL/L (ref 135–144)
TROPONIN INTERP: ABNORMAL
TROPONIN INTERP: ABNORMAL
TROPONIN T: ABNORMAL NG/ML
TROPONIN T: ABNORMAL NG/ML
TROPONIN, HIGH SENSITIVITY: 26 NG/L (ref 0–14)
TROPONIN, HIGH SENSITIVITY: 27 NG/L (ref 0–14)
TSH SERPL DL<=0.05 MIU/L-ACNC: 1.02 MIU/L (ref 0.3–5)
WBC # BLD: 8 K/UL (ref 3.5–11.3)
WBC # BLD: ABNORMAL 10*3/UL

## 2020-08-10 PROCEDURE — 6370000000 HC RX 637 (ALT 250 FOR IP): Performed by: EMERGENCY MEDICINE

## 2020-08-10 PROCEDURE — 96374 THER/PROPH/DIAG INJ IV PUSH: CPT

## 2020-08-10 PROCEDURE — 6360000002 HC RX W HCPCS: Performed by: EMERGENCY MEDICINE

## 2020-08-10 PROCEDURE — 71045 X-RAY EXAM CHEST 1 VIEW: CPT

## 2020-08-10 PROCEDURE — 70450 CT HEAD/BRAIN W/O DYE: CPT

## 2020-08-10 PROCEDURE — 96375 TX/PRO/DX INJ NEW DRUG ADDON: CPT

## 2020-08-10 PROCEDURE — 85025 COMPLETE CBC W/AUTO DIFF WBC: CPT

## 2020-08-10 PROCEDURE — 99284 EMERGENCY DEPT VISIT MOD MDM: CPT

## 2020-08-10 PROCEDURE — 84484 ASSAY OF TROPONIN QUANT: CPT

## 2020-08-10 PROCEDURE — 80048 BASIC METABOLIC PNL TOTAL CA: CPT

## 2020-08-10 PROCEDURE — 84443 ASSAY THYROID STIM HORMONE: CPT

## 2020-08-10 RX ORDER — DIPHENHYDRAMINE HYDROCHLORIDE 50 MG/ML
12.5 INJECTION INTRAMUSCULAR; INTRAVENOUS ONCE
Status: COMPLETED | OUTPATIENT
Start: 2020-08-10 | End: 2020-08-10

## 2020-08-10 RX ORDER — ACETAMINOPHEN 325 MG/1
650 TABLET ORAL ONCE
Status: COMPLETED | OUTPATIENT
Start: 2020-08-10 | End: 2020-08-10

## 2020-08-10 RX ORDER — PROCHLORPERAZINE EDISYLATE 5 MG/ML
10 INJECTION INTRAMUSCULAR; INTRAVENOUS ONCE
Status: COMPLETED | OUTPATIENT
Start: 2020-08-10 | End: 2020-08-10

## 2020-08-10 RX ADMIN — DIPHENHYDRAMINE HYDROCHLORIDE 12.5 MG: 50 INJECTION, SOLUTION INTRAMUSCULAR; INTRAVENOUS at 11:44

## 2020-08-10 RX ADMIN — PROCHLORPERAZINE EDISYLATE 10 MG: 5 INJECTION INTRAMUSCULAR; INTRAVENOUS at 11:43

## 2020-08-10 RX ADMIN — ACETAMINOPHEN 650 MG: 325 TABLET ORAL at 11:43

## 2020-08-10 ASSESSMENT — ENCOUNTER SYMPTOMS
APNEA: 0
PHOTOPHOBIA: 1
CONSTIPATION: 0
ABDOMINAL PAIN: 0
SHORTNESS OF BREATH: 1
BACK PAIN: 0
CHEST TIGHTNESS: 0
ABDOMINAL DISTENTION: 0
VOMITING: 0
COLOR CHANGE: 0
FACIAL SWELLING: 0
DIARRHEA: 0
NAUSEA: 0

## 2020-08-10 ASSESSMENT — PAIN DESCRIPTION - DESCRIPTORS: DESCRIPTORS: HEADACHE

## 2020-08-10 ASSESSMENT — PAIN DESCRIPTION - PAIN TYPE: TYPE: ACUTE PAIN

## 2020-08-10 ASSESSMENT — PAIN SCALES - GENERAL
PAINLEVEL_OUTOF10: 8
PAINLEVEL_OUTOF10: 8

## 2020-08-10 NOTE — ED NOTES
Writer at bedside to obtain repeat troponin. IV not patent at this time.  Zack Senior, EMT-P, at bedside to obtain additional IV access     Jl North RN  08/10/20 5121

## 2020-08-10 NOTE — ED NOTES
Pt called out, requesting something to eat.    Dr. Nell Rivas at bedside to reassess pt      Michelle Cho RN  08/10/20 5014

## 2020-08-10 NOTE — ED NOTES
Writer contacted APS to make a report, patient has an open and active case with APS. A message was left with  Jatinder Guzman at [phone] regarding current concerns and plan for patient to be discharged home.      DAMARIS Banks  08/10/20 2348

## 2020-08-10 NOTE — ED NOTES
Neelam Dawson, Son, phone number [phone].      Darnell Naavrro, [facility]  08/10/20 6122

## 2020-08-10 NOTE — ED NOTES
Writer unable to obtain IV acess.    ANTELMO Patel, at bedside with [address] [facility] to obtain IV access     George Mojica RN  08/10/20 2675

## 2020-08-10 NOTE — ED NOTES
Writer met with patient at bedside, she is sitting up in bed and engages in conversation. She reports that she does not like her son Chase Burrell is a crook. \"  She reports that at discharge she would like to return home to her own apartment. She specifically asks for writer not to speak with [facility] any further. Writer informed patient that she saw the video of patients home from [facility], she reports [facility] has no right to take that. \" Writer discussed linking patient with home health care for assistance with needs at home. Patient agreed to home health care and when discussed denied SNF placement. Writer assessed for substance use, patient denied use. She reports no further needs at this time.       Raffi Narayanan, [address]  08/10/20 3830

## 2020-08-10 NOTE — ED PROVIDER NOTES
[facility] ED  Emergency Department Encounter  EmergencyMedicine Resident     Pt Name:Emi Leigh  MRN: [id number]  Birthdate 1955  Date of evaluation: 8/10/20  PCP:  No primary care provider on file. CHIEF COMPLAINT       Chief Complaint   Patient presents with    Migraine     x 3 days. Hx of migraines, todays is similiar        HISTORY OF PRESENT ILLNESS  (Location/Symptom, Timing/Onset, Context/Setting, Quality, Duration, Modifying Factors, Severity.)      Emi Daniels is a 77 y.o. female who presents with frontal headache x3 days. She states that she has had a headache off and on. Described in the front of her head. She has associated photophobia and vision changes, with her vision she thinks that is due to her glasses not functioning correctly. She states that she has been forgetting some words periodically and forgetting things. She has not fallen or hit her head. Does not know she has been taking Eliquis as prescribed. She she also states that she has been having little bit of chest pain and some shortness of breath. She denies any nausea, vomiting, abdominal pain, fevers, chills. Her bowels were initially diarrhea previously a couple days ago now there more solid and she does elicit to some urinary frequency. Per social work her son states that she has been living in [facility], with frequent crack cocaine use, concerned that she is not able to take care of herself And making poor decisions.   PAST MEDICAL / SURGICAL / SOCIAL / FAMILY HISTORY      has a past medical history of Appendicitis, Cerebral artery occlusion with cerebral infarction ([facility] CHF (congestive heart failure) (HCC), Chronic respiratory failure with hypoxia and hypercapnia (HCC), Chronic rhinitis, Cigarette smoker, COPD (chronic obstructive pulmonary disease) ([address] Depression, Dysphagia, Essential hypertension, GERD (gastroesophageal reflux disease), Heart failure, diastolic, with acute decompensation ([facility] Hepatitis C, chronic ([facility] History of smoking at least 1 pack per day for at least 30 years, Hyperlipidemia, Hypertension, Migraine, Moderate COPD (chronic obstructive pulmonary disease) ([facility] Multiple transfusions, Neurogenic dysphagia, Osteoarthritis, Pneumonia, Scoliosis, and Substance abuse ([facility] No other further history to be evaluated     has a past surgical history that includes  section; LEEP (); Upper gastrointestinal endoscopy (); Gastric bypass surgery; Appendectomy (2017); laparoscopic appendectomy (N/A, 3/27/2017); Upper gastrointestinal endoscopy (2018); Upper gastrointestinal endoscopy (N/A, 2018); pr colonoscopy w/biopsy single/multiple (N/A, 11/10/2018); pr egd transoral biopsy single/multiple (N/A, 2018); hip surgery (Right, 2019); and Total hip arthroplasty (Right, 2019).   Other pertinent surgical history    Social History     Socioeconomic History    Marital status: Single     Spouse name: Not on file    Number of children: Not on file    Years of education: Not on file    Highest education level: Not on file   Occupational History    Not on file   Social Needs    Financial resource strain: Not on file    Food insecurity     Worry: Not on file     Inability: Not on file    Transportation needs     Medical: Not on file     Non-medical: Not on file   Tobacco Use    Smoking status: Former Smoker     Packs/day: 1.00     Years: 40.00     Pack years: 40.00     Types: Cigarettes     Start date:      Last attempt to quit: 2018     Years since quittin.7    Smokeless tobacco: Never Used   Substance and Sexual Activity    Alcohol use: No     Alcohol/week: 0.0 standard drinks    Drug use: No     Types: IV, Cocaine     Comment: past history    Sexual activity: Not on file   Lifestyle    Physical activity     Days per week: Not on file     Minutes per session: Not on file    Stress: Not on file   Relationships    Social connections     Talks on phone: Not on file     Gets together: Not on file     Attends Confucianist service: Not on file     Active member of club or organization: Not on file     Attends meetings of clubs or organizations: Not on file     Relationship status: Not on file    Intimate partner violence     Fear of current or ex partner: Not on file     Emotionally abused: Not on file     Physically abused: Not on file     Forced sexual activity: Not on file   Other Topics Concern    Not on file   Social History Narrative    Not on file       Family History   Problem Relation Age of Onset    Breast Cancer Mother     Heart Disease Father        Allergies:  Aspirin and Karen Musty [cephalexin]    Home Medications:  Prior to Admission medications    Medication Sig Start Date End Date Taking?  Authorizing Provider   docusate sodium (COLACE) 100 MG capsule Take 1 capsule by mouth 2 times daily for 14 days 8/2/20 8/16/20  Elizabeth Brenner MD   senna (SENOKOT) 8.6 MG TABS tablet Take 1 tablet by mouth daily for 14 days 8/2/20 8/16/20  Elizabeth Brenner MD   apixaban (ELIQUIS) 5 MG TABS tablet Take 1 tablet by mouth 2 times daily 8/7/20   Elizabeth Brenner MD   ibuprofen (ADVIL;MOTRIN) 600 MG tablet Take 1 tablet by mouth 4 times daily as needed for Pain 7/16/20 7/30/20  Juan Kim, DO   diclofenac (VOLTAREN) 50 MG EC tablet Take 1 tablet by mouth 2 times daily 4/6/20   Christi Woodard, DO   amitriptyline (ELAVIL) 10 MG tablet Take 1 tablet by mouth nightly 1/31/20   Chip Salt, DO   atorvastatin (LIPITOR) 40 MG tablet Take 1 tablet by mouth nightly 1/31/20   Chip Salt, DO   butalbital-acetaminophen-caffeine (FIORICET, ESGIC) -40 MG per tablet Take 1 tablet by mouth every 4 hours as needed for Headaches 1/31/20   Chip Salt, DO   clopidogrel (PLAVIX) 75 MG tablet Take 1 tablet by mouth daily 2/1/20   Annel Andino, DO   albuterol sulfate HFA (PROVENTIL HFA) 108 (90 Base) MCG/ACT inhaler Inhale 1-2 puffs into the lungs once for 1 dose 1/9/20 6/15/20  Suleman Sanches MD   ondansetron (ZOFRAN ODT) 4 MG disintegrating tablet Take 1 tablet by mouth every 8 hours as needed for Nausea 9/9/19   Sandra Wolf PA-C   Misc. Devices [facility]) MISC 1 Device by Does not apply route once for 1 dose 8/5/19 8/5/19  Sandra Wolf PA-C   hydrOXYzine (ATARAX) 25 MG tablet Take 1 tablet by mouth every 8 hours as needed for Itching 8/5/19   Sandra Wolf PA-C   benzonatate (TESSALON PERLES) 100 MG capsule Take 1 capsule by mouth 3 times daily as needed for Cough 3/11/19   Angi Lopez DO   carvedilol (COREG) 6.25 MG tablet Take 1 tablet by mouth 2 times daily (with meals) 2/7/19   Nikky Bishop MD   ferrous sulfate (MELISSA-HAO) 325 (65 Fe) MG tablet Take 1 tablet by mouth daily 2/7/19   Nikky Bishop MD   melatonin 1 MG tablet Take 1 tablet by mouth nightly as needed for Sleep 1/11/19   Annamarie Balderas MD   ipratropium-albuterol (DUONEB) 0.5-2.5 (3) MG/3ML SOLN nebulizer solution Inhale 3 mLs into the lungs every 4 hours as needed for Shortness of Breath 1/11/19   Annamarie Balderas MD   mometasone-formoterol [facility]) 100-5 MCG/ACT inhaler Inhale 2 puffs into the lungs 2 times daily 12/5/18   Gail Vences MD   pantoprazole (PROTONIX) 40 MG tablet Take 1 tablet by mouth 2 times daily (before meals) 11/13/18   Adarsh Felix MD   folic acid (FOLVITE) 1 MG tablet Take 1 tablet by mouth daily 10/13/18   Jayleen Grier MD   vitamin B-1 100 MG tablet Take 1 tablet by mouth daily 10/13/18   Jayleen Grier MD   vitamin B-12 250 MCG tablet Take 1 tablet by mouth daily 10/13/18   Jayleen Grier MD   gabapentin (NEURONTIN) 300 MG capsule Take 300 mg by mouth 3 times daily. Mary Morgan     Historical Provider, MD   Multiple Vitamins-Minerals (THERAPEUTIC MULTIVITAMIN-MINERALS) tablet Take 1 tablet by mouth daily 8/12/18   Zach Bhardwaj MD       REVIEW OF SYSTEMS    (2-9 systems for level 4, 10 or more for level 5) Review of Systems   Constitutional: Negative for chills, fatigue and fever. HENT: Negative for facial swelling and nosebleeds. Eyes: Positive for photophobia and visual disturbance. Respiratory: Positive for shortness of breath. Negative for apnea and chest tightness. Cardiovascular: Negative for chest pain and leg swelling. Gastrointestinal: Negative for abdominal distention, abdominal pain, constipation, diarrhea, nausea and vomiting. Genitourinary: Positive for frequency. Negative for difficulty urinating and dysuria. Musculoskeletal: Negative for back pain. Skin: Negative for color change. Neurological: Positive for headaches. Negative for dizziness, facial asymmetry, weakness, light-headedness and numbness. Psychiatric/Behavioral: Negative for agitation, behavioral problems, confusion and suicidal ideas. PHYSICAL EXAM   (up to 7 for level 4, 8 or more for level 5)      INITIAL VITALS:   /76   Pulse 99   Temp 99.4 °F (37.4 °C) (Oral)   Resp 19   Ht 4' 11\" (1.499 m)   Wt 103 lb (46.7 kg)   LMP 03/18/2015   SpO2 94%   BMI 20.80 kg/m²     Physical Exam  Vitals signs and nursing note reviewed. Constitutional:       Appearance: She is underweight. HENT:      Head: Normocephalic and atraumatic. Mouth/Throat:      Mouth: Mucous membranes are moist.      Pharynx: Oropharynx is clear. Eyes:      Extraocular Movements: Extraocular movements intact. Conjunctiva/sclera: Conjunctivae normal.      Pupils: Pupils are equal, round, and reactive to light. Comments: Decreased peripheral vision in all 4 quadrants. Neck:      Musculoskeletal: Normal range of motion and neck supple. Cardiovascular:      Rate and Rhythm: Normal rate and regular rhythm. Pulses: Normal pulses. Radial pulses are 2+ on the right side and 2+ on the left side. Heart sounds: Normal heart sounds. No murmur.    Pulmonary:      Effort: Pulmonary effort is normal. previous exam.  No new   evidence of an acute cardiopulmonary process. Continued radiographic   follow-up is recommended to ensure complete resolution. CT HEAD WO CONTRAST   Final Result   No acute intracranial abnormality. Possible mild bilateral maxillary and right sphenoidal sinusitis. EMERGENCY DEPARTMENT COURSE:      Patient seen and examined by myself and Dr. Radha Arora. History and physical were obtained. Patient stated having social needs at home. Patient's son expressed concerns for social issues to  and concerns for extensive crack cocaine use. Social work to get Adult Protective Services involved and to work on home health to ensure proper living condition. Patient's headache was worked up for CT scan. aCute coronary syndrome was work-up in addition shortness of breath was performed. Patient was given headache cocktail which eased her headache and made her slightly drowsy. Patient was given p.o. fluid and food, able to eat without difficulty. Patient was assessed for decision-making capacity, conversation was held patient about drug use which she adamantly denied in her living conditions which she stated was fine and that she was able to care for self. Patient was ANO x4 and able to show which he had appropriate decision-making capacity though her decisions may not be correct. Patient was discharged home with Adult Protective Services to investigate living condition and home health. See 's note for any questions. PROCEDURES:  No procedures indicated    CONSULTS:  IP CONSULT TO SOCIAL WORK    MEDICAL DECISION MAKING:  Negative head CT for intracranial hemorrhage or additional cerebral pathology. Labs within normal limits. Troponin was slightly elevated but at patient's past baseline. Chest x-ray showed improvement from previous scan.   Patient was assessed for decision-making capacity, showed appropriate decision-making capacity and was not altered in any way. Patient was discharged home and instructed to return if headache got worse or if she had any concerning signs or symptoms to return such as nausea, vomiting, shortness of breath, chest pain, abdominal pain, or worse headache of life. CRITICAL CARE:  Please see attending note    FINAL IMPRESSION      1.  Chronic migraine without aura without status migrainosus, not intractable          DISPOSITION / PLAN     DISPOSITION Decision To Discharge 08/10/2020 03:27:00 PM      PATIENT REFERRED TO:  [address] [place] [id number] [phone]  Schedule an appointment as soon as possible for a visit today        DISCHARGE MEDICATIONS:  New Prescriptions    No medications on file       Bharti Sawyer, [address], DO  Emergency Medicine Resident    (Please note that portions of thisnote were completed with a voice recognition program.  Efforts were made to edit the dictations but occasionally words are mis-transcribed.)       Jared Higgins DO  Resident  08/10/20 2459

## 2020-08-10 NOTE — DISCHARGE INSTR - COC
Continuity of Care Form    Patient Name: Driss Wetzel   :  1955  MRN:  [id number]    Admit date:  8/10/2020  Discharge date:  ***    Code Status Order: Prior   Advance Directives:     Admitting Physician:  No admitting provider for patient encounter. PCP: No primary care provider on file. Discharging Nurse: [facility] Unit/Room#:   Discharging Unit Phone Number: ***    Emergency Contact:   Extended Emergency Contact Information  Primary Emergency Contact: Yanna Garcia [address] Phone: [phone]  Mobile Phone: [phone]  Relation: Brother/Sister   needed? No    Past Surgical History:  Past Surgical History:   Procedure Laterality Date    APPENDECTOMY  2017     SECTION      GASTRIC BYPASS SURGERY      HIP SURGERY Right 2019    HIP TOTAL ARTHROPLASTY ANTERIOR APPROACH - [facility],  C-[facility], (Right )    LAPAROSCOPIC APPENDECTOMY N/A 3/27/2017    APPENDECTOMY LAPAROSCOPIC performed by Lyric Pinzon MD at [address] [place] [address]      hgsil    [place] COLONOSCOPY W/BIOPSY SINGLE/MULTIPLE N/A 11/10/2018    COLONOSCOPY WITH BIOPSY performed by Mariana Reynolds MD at [facility]    AZ EGD TRANSORAL BIOPSY SINGLE/MULTIPLE N/A 2018    EGD ESOPHAGOGASTRODUODENOSCOPY performed by Katerine Barrett MD at [address] [place] [address] Right 2019    HIP TOTAL ARTHROPLASTY ANTERIOR APPROACH - [facility],  C-ARM, performed by Reynold Poole DO at [facility]      oesophagitis, candidiasis    UPPER GASTROINTESTINAL ENDOSCOPY  2018    UPPER GASTROINTESTINAL ENDOSCOPY N/A 2018    EGD CONTROL HEMORRHAGE performed by Katerine Barrett MD at [place] [address]       Immunization History: There is no immunization history for the selected administration types on file for this patient.     Active Problems:  Patient Active Problem List   Diagnosis Code    Essential hypertension I10    Hepatitis C B19.20    History of substance abuse ([facility] F19.11    Chronic heart failure with reduced ejection fraction and diastolic dysfunction (HCC) I50.42    COPD (chronic obstructive pulmonary disease) (HCC) R12.8    Alcoholic cirrhosis of liver without ascites (HCC) K70.30    PUD (peptic ulcer disease) K27.9    Anemia D64.9    Duodenal ulcer with hemorrhage K26.4    Psychosis, intermittent requring antipsychotic meds F29    Opiate withdrawal (HCC) F11.23    Pneumonia J18.9    Pancreas head lesion S36.200A    Polysubstance abuse (HCC) F19.10    Cerebrovascular accident (CVA) (HCC) I63.9    Avascular necrosis of bone of right hip (HCC) M87.051    Closed fracture of head of right femur (HCC) S72.051A    COPD without exacerbation (HCC) J44.9    History of hepatitis C Z86.19    Thrombocytosis (HCC) D47.3    Avascular necrosis (HCC) M87.00    History of total right hip replacement Z96.641    Protein calorie malnutrition (HCC) E46    Chronic diastolic congestive heart failure (HCC) I50.32    Right sided weakness R53.1    Chronic daily headache R51    Memory changes R41.3    Generalized weakness R53.1    Stroke with cerebral ischemia (HCC) I63.9    SOB (shortness of breath) R06.02    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.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    Pulmonary embolism on right (HCC) I26.99    Multiple subsegmental pulmonary emboli without acute cor pulmonale I26.94    Acute on chronic blood loss anemia D62       Isolation/Infection:   Isolation          No Isolation        Patient Infection Status     Infection Onset Added Last Indicated Last Indicated By Review Planned Expiration Resolved Resolved By    None active    Resolved    COVID-19 Rule Out 07/19/20 07/19/20 07/19/20 COVID-19 (Ordered)   07/19/20 Rule-Out Test Resulted    COVID-19 Rule Out 04/24/20 04/24/20 04/24/20 COVID-19 (Ordered)   04/25/20 Rule-Out Test Resulted          Nurse Assessment:  Last Vital Signs: BP (!) 142/90   Pulse 102   Temp 99.4 °F (37.4 °C) (Oral)   Resp 19   Ht 4' 11\" (1.499 m)   Wt 103 lb (46.7 kg)   LMP 2015   SpO2 95%   BMI 20.80 kg/m²     Last documented pain score (0-10 scale): Pain Level: 8  Last Weight:   Wt Readings from Last 1 Encounters:   08/10/20 103 lb (46.7 kg)     Mental Status:  {IP PT MENTAL STATUS:51187}    IV Access:  { SKINNY IV ACCESS:066977516}    Nursing Mobility/ADLs:  Walking   {CHP DME XQWP:166260377}  Transfer  {CHP DME NMEZ:570206117}  Bathing  {CHP DME LSC}  Dressing  {CHP DME AEWD:076059663}  Toileting  {CHP DME GATL:506912457}  Feeding  {CHP DME YNQT:224048395}  Med Admin  {P DME BGDF:828766867}  Med Delivery   {[facility] MED Delivery:298772160}    Wound Care Documentation and Therapy:        Elimination:  Continence:   · Bowel: {YES / QS:11018}  · Bladder: {YES / XW:47220}  Urinary Catheter: {Urinary Catheter:030174251}   Colostomy/Ileostomy/Ileal Conduit: {YES / ZH:43013}       Date of Last BM: ***  No intake or output data in the 24 hours ending 08/10/20 1504  No intake/output data recorded.     Safety Concerns:     [address] Safety Concerns:285313762}    Impairments/Disabilities:      [address] Impairments/Disabilities:366547913}    Nutrition Therapy:  Current Nutrition Therapy:   [address] Diet List:107306384}    Routes of Feeding: {CHP DME Other Feedings:201964825}  Liquids: {Slp liquid thickness:33928}  Daily Fluid Restriction: {CHP DME Yes amt example:279247486}  Last Modified Barium Swallow with Video (Video Swallowing Test): {Done Not Done STLR:874941947}    Treatments at the Time of Hospital Discharge:   Respiratory Treatments: ***  Oxygen Therapy:  {Therapy; copd oxygen:79702}  Ventilator:    { CC Vent WFA}    Rehab Therapies: {THERAPEUTIC INTERVENTION:1404533976}  Weight Bearing Status/Restrictions: [address] MEDINA Weight Bearin}  Other Medical Equipment (for information only, NOT a DME order):  {EQUIPMENT:134971134}  Other Treatments: ***    Patient's personal belongings (please select all that are sent with patient):  {CHP DME Belongings:814443549}    RN SIGNATURE:  {Esignature:858806525}    CASE MANAGEMENT/SOCIAL WORK SECTION    Inpatient Status Date: ***    Readmission Risk Assessment Score:  Readmission Risk              Risk of Unplanned Readmission:        0           Discharging to Facility/ [address]           [address] [facility] [place] [address]       Phone: [phone]       Fax: [phone]        ·     Dialysis Facility (if applicable)   · Name:  · Address:  · Dialysis Schedule:  · Phone:  · Fax:    / signature: {Esignature:677063871}    PHYSICIAN SECTION    Prognosis: {Prognosis:6209265694}    Condition at Discharge: Erasmo Benitez Shai Patient Condition:782834690}    Rehab Potential (if transferring to Rehab): {Prognosis:4315825639}    Recommended Labs or Other Treatments After Discharge: ***    Physician Certification: I certify the above information and transfer of Emi Daniels  is necessary for the continuing treatment of the diagnosis listed and that she requires {Admit to Appropriate Level of Care:82371} for {GREATER/LESS:292794867} 30 days.      Update Admission H&P: {[facility]P DME [facility]626959772}    PHYSICIAN SIGNATURE:  {Esignature:954060853}

## 2020-08-10 NOTE — ED NOTES
Ana Maria Barrientos, EMT-P, at bedside and unable to obtain IV access. Dr. Hero Schmidt notified and per Dr. Hero Schmidt, no need for IV at this time.    Will continue to monitor      Loren Sanchez RN  08/10/20 7817

## 2020-08-10 NOTE — ED NOTES
Pt requesting discharge.  Dr. Prashant Monzon at bedside      L[place]raffi [place], [facility]  08/10/20 6680

## 2020-08-10 NOTE — ED NOTES
Pt to the ED c/o migraine. Pt states that she has been having migraine x 3 days. Pt reports associated nausea, light and sound sensitivity, and some worsened vision. Pt states that she has also has been having worsened memory, states \"I keep forgetting some stuff\". Pt reports hx of migraines, states that she has similar symptoms with her normal migraines. Pt states that she has been taking OTC medication, without relief. Pt also c/o SOB, states that she was seen here in the past and was prescribed inhalers which she has been taking at home.         George Mojica RN  08/10/20 0636

## 2020-08-10 NOTE — ED NOTES
Pt resting on stretcher with eyes closed, chest rise / fall noted. Pt arousable and oriented x 4. Pt in NAD, remains on BP and pulse ox monitoring with alarms set.    Will continue to monitor      Ashley Baca RN  08/10/20 4523

## 2020-09-15 ENCOUNTER — HOSPITAL ENCOUNTER (EMERGENCY)
Age: 65
Discharge: HOME | End: 2020-09-15
Attending: EMERGENCY MEDICINE
Payer: MEDICARE

## 2020-09-15 VITALS
WEIGHT: 80 LBS | SYSTOLIC BLOOD PRESSURE: 137 MMHG | OXYGEN SATURATION: 98 % | TEMPERATURE: 98.6 F | HEIGHT: 59 IN | HEART RATE: 83 BPM | BODY MASS INDEX: 16.13 KG/M2 | RESPIRATION RATE: 18 BRPM | DIASTOLIC BLOOD PRESSURE: 76 MMHG

## 2020-09-15 PROBLEM — N17.9 AKI (ACUTE KIDNEY INJURY) (HCC): Status: ACTIVE | Noted: 2020-09-15

## 2020-09-15 LAB
-: ABNORMAL
AMORPHOUS: ABNORMAL
ANION GAP SERPL CALCULATED.3IONS-SCNC: 11 MMOL/L (ref 9–17)
BACTERIA: ABNORMAL
BILIRUBIN URINE: NEGATIVE
BUN BLDV-MCNC: 14 MG/DL (ref 8–23)
BUN/CREAT BLD: ABNORMAL (ref 9–20)
CALCIUM SERPL-MCNC: 9.5 MG/DL (ref 8.6–10.4)
CASTS UA: ABNORMAL /LPF (ref 0–8)
CHLORIDE BLD-SCNC: 112 MMOL/L (ref 98–107)
CO2: 19 MMOL/L (ref 20–31)
COLOR: YELLOW
COMMENT UA: ABNORMAL
CREAT SERPL-MCNC: 1.26 MG/DL (ref 0.5–0.9)
CRYSTALS, UA: ABNORMAL /HPF
EPITHELIAL CELLS UA: ABNORMAL /HPF (ref 0–5)
GFR AFRICAN AMERICAN: 51 ML/MIN
GFR NON-AFRICAN AMERICAN: 42 ML/MIN
GFR SERPL CREATININE-BSD FRML MDRD: ABNORMAL ML/MIN/{1.73_M2}
GFR SERPL CREATININE-BSD FRML MDRD: ABNORMAL ML/MIN/{1.73_M2}
GLUCOSE BLD-MCNC: 84 MG/DL (ref 70–99)
GLUCOSE URINE: NEGATIVE
HCT VFR BLD CALC: 41.7 % (ref 36.3–47.1)
HEMOGLOBIN: 12.8 G/DL (ref 11.9–15.1)
KETONES, URINE: NEGATIVE
LEUKOCYTE ESTERASE, URINE: ABNORMAL
MCH RBC QN AUTO: 30.4 PG (ref 25.2–33.5)
MCHC RBC AUTO-ENTMCNC: 30.7 G/DL (ref 28.4–34.8)
MCV RBC AUTO: 99 FL (ref 82.6–102.9)
MUCUS: ABNORMAL
NITRITE, URINE: POSITIVE
NRBC AUTOMATED: 0 PER 100 WBC
OTHER OBSERVATIONS UA: ABNORMAL
PDW BLD-RTO: 15.5 % (ref 11.8–14.4)
PH UA: 6.5 (ref 5–8)
PLATELET # BLD: 516 K/UL (ref 138–453)
PMV BLD AUTO: 8.9 FL (ref 8.1–13.5)
POTASSIUM SERPL-SCNC: 3.6 MMOL/L (ref 3.7–5.3)
PROTEIN UA: ABNORMAL
RBC # BLD: 4.21 M/UL (ref 3.95–5.11)
RBC UA: ABNORMAL /HPF (ref 0–4)
RENAL EPITHELIAL, UA: ABNORMAL /HPF
SODIUM BLD-SCNC: 142 MMOL/L (ref 135–144)
SPECIFIC GRAVITY UA: 1.01 (ref 1–1.03)
TRICHOMONAS: ABNORMAL
TURBIDITY: ABNORMAL
URINE HGB: ABNORMAL
UROBILINOGEN, URINE: NORMAL
WBC # BLD: 4.3 K/UL (ref 3.5–11.3)
WBC UA: ABNORMAL /HPF (ref 0–5)
YEAST: ABNORMAL

## 2020-09-15 PROCEDURE — 87186 SC STD MICRODIL/AGAR DIL: CPT

## 2020-09-15 PROCEDURE — 96366 THER/PROPH/DIAG IV INF ADDON: CPT

## 2020-09-15 PROCEDURE — 87077 CULTURE AEROBIC IDENTIFY: CPT

## 2020-09-15 PROCEDURE — 85027 COMPLETE CBC AUTOMATED: CPT

## 2020-09-15 PROCEDURE — 96365 THER/PROPH/DIAG IV INF INIT: CPT

## 2020-09-15 PROCEDURE — 81001 URINALYSIS AUTO W/SCOPE: CPT

## 2020-09-15 PROCEDURE — 87086 URINE CULTURE/COLONY COUNT: CPT

## 2020-09-15 PROCEDURE — 80048 BASIC METABOLIC PNL TOTAL CA: CPT

## 2020-09-15 PROCEDURE — 2580000003 HC RX 258: Performed by: STUDENT IN AN ORGANIZED HEALTH CARE EDUCATION/TRAINING PROGRAM

## 2020-09-15 PROCEDURE — 99284 EMERGENCY DEPT VISIT MOD MDM: CPT

## 2020-09-15 PROCEDURE — 6360000002 HC RX W HCPCS: Performed by: STUDENT IN AN ORGANIZED HEALTH CARE EDUCATION/TRAINING PROGRAM

## 2020-09-15 PROCEDURE — 96375 TX/PRO/DX INJ NEW DRUG ADDON: CPT

## 2020-09-15 RX ORDER — 0.9 % SODIUM CHLORIDE 0.9 %
1000 INTRAVENOUS SOLUTION INTRAVENOUS ONCE
Status: COMPLETED | OUTPATIENT
Start: 2020-09-15 | End: 2020-09-15

## 2020-09-15 RX ORDER — KETOROLAC TROMETHAMINE 15 MG/ML
15 INJECTION, SOLUTION INTRAMUSCULAR; INTRAVENOUS ONCE
Status: COMPLETED | OUTPATIENT
Start: 2020-09-15 | End: 2020-09-15

## 2020-09-15 RX ADMIN — SODIUM CHLORIDE 1000 ML: 9 INJECTION, SOLUTION INTRAVENOUS at 13:36

## 2020-09-15 RX ADMIN — CEFTRIAXONE SODIUM 1 G: 1 INJECTION, POWDER, FOR SOLUTION INTRAMUSCULAR; INTRAVENOUS at 13:36

## 2020-09-15 RX ADMIN — KETOROLAC TROMETHAMINE 15 MG: 15 INJECTION, SOLUTION INTRAMUSCULAR; INTRAVENOUS at 12:04

## 2020-09-15 ASSESSMENT — PAIN SCALES - GENERAL
PAINLEVEL_OUTOF10: 9
PAINLEVEL_OUTOF10: 9

## 2020-09-15 ASSESSMENT — PAIN DESCRIPTION - PAIN TYPE: TYPE: ACUTE PAIN;CHRONIC PAIN

## 2020-09-15 NOTE — ED NOTES
Writer at bedside, pt demanding to leave AMA.    Writer updated Dr. Jo Gloria on this      Agustin Mohr RN  09/15/20 1681

## 2020-09-15 NOTE — ED NOTES
Pt provided carina hernández per request.  Pt denies additional needs at this time, will continue to monitor      Daron Isaac RN  09/15/20 7513

## 2020-09-15 NOTE — ED NOTES
Juli Bustillo RN, at bedside to hang IV antibiotics. IV infiltrated at this time.    Benja EMT-P, called to bedside to obtain additional IV access     Eusebia Donovan RN  09/15/20 8711

## 2020-09-15 NOTE — ED NOTES
Pt to the ED c/o dysuria and pain to an open sore on buttocks / hip. Pt reports dysuria x 1 weeks and states that she has been urinating more than normal.     Pt states that she has recently been having worsening pain to an open sore on her buttocks / hip. Pt states that she has been sitting around more and not getting around like she used to.      Pt denies abdominal pain, chest pain, SOB, or any other complaints at this time      Donn Serrato RN  09/15/20 6191

## 2020-09-15 NOTE — ED NOTES
Writer at bedside. Pt refusing to be placed in a gown and on telemetry monitoring at this time. IV antibiotics infusing, repeat vitals obtained.       Jacy Vega RN  09/15/20 0459

## 2020-09-15 NOTE — CARE COORDINATION
Voicemail message received from [facility], states the patient called and left a message that she was in the hospital, asking for a call back at [phone]. Call placed to [facility] at [phone], informed that patient left AMA.

## 2020-09-15 NOTE — ED PROVIDER NOTES
[facility]     Emergency Department     Faculty Attestation    I performed a history and physical examination of the patient and discussed management with the resident. I have reviewed and agree with the residents findings including all diagnostic interpretations, and treatment plans as written at the time of my review. Any areas of disagreement are noted on the chart. I was personally present for the key portions of any procedures. I have documented in the chart those procedures where I was not present during the key portions. For Physician Assistant/ Nurse Practitioner cases/documentation I have personally evaluated this patient and have completed at least one if not all key elements of the E/M (history, physical exam, and MDM). Additional findings are as noted. This patient was evaluated in the Emergency Department for symptoms described in the history of present illness. The patient was evaluated in the context of the global COVID-19 pandemic, which necessitated consideration that the patient might be at risk for infection with the SARS-CoV-2 virus that causes COVID-19. Institutional protocols and algorithms that pertain to the evaluation of patients at risk for COVID-19 are in a state of rapid change based on information released by regulatory bodies including the CDC and federal and state organizations. These policies and algorithms were followed during the patient's care in the ED. Primary Care Physician: No primary care provider on file. History: This is a 77 y.o. female who presents to the Emergency Department with complaint of dysuria. The patient presents emerged by complaint of dysuria this been ongoing for the last week. She does complain some lower abdominal discomfort. Patient denies any fever, chills or sweats. Physical:   height is 4' 11\" (1.499 m) and weight is 80 lb (36.3 kg). Her oral temperature is 98.6 °F (37 °C).  Her blood pressure is 162/97 (abnormal) and her pulse is 89. Her respiration is 19 and oxygen saturation is 94%. Tenderness to palpation in the suprapubic region,    Impression: Dysuria    Plan: Urinalysis    (Please note that portions of this note were completed with a voice recognition program.  Efforts were made to edit the dictations but occasionally words are mis-transcribed.)    Sonali Schofield.  Delmer Mojica MD, [facility]  Attending Emergency Medicine Physician        Dee Decker MD  09/15/20 2228

## 2020-09-15 NOTE — ED PROVIDER NOTES
[facility] ED  Emergency Department Encounter  Emergency Medicine Resident     Pt Name: Driss Wetzel  MRN: [id number]  [place] 1955  Date of evaluation: 9/15/20  PCP:  No primary care provider on file. CHIEF COMPLAINT       Chief Complaint   Patient presents with    Dysuria     PAIN WITH URINATION X 1 WEEK        HISTORY OFPRESENT ILLNESS  (Location/Symptom, Timing/Onset, Context/Setting, Quality, Duration, Modifying Chary Mocha.)      Emi Daniels is a 77 y.o. female who presents with pain with urination for 4 days duration. Patient states that she has not had many urinary tract infections in the past.  Patient denies any fever, chills, nausea, vomiting. Patient takes medication for high blood pressure. Patient denies any vaginal bleeding, vaginal discharge, blood in urine. Patient denies any abdominal pain. Patient also has a complaint of a sore on her buttocks, its on her left buttock side, denies any purulent drainage. Patient states is been going on for a few weeks. Patient rides around in a walker that turns into a seat and sits on this quite frequently. Patient states is where she sits down it.     PAST MEDICAL / SURGICAL / SOCIAL / FAMILY HISTORY      has a past medical history of Appendicitis, Cerebral artery occlusion with cerebral infarction ([address] CHF (congestive heart failure) (HCC), Chronic respiratory failure with hypoxia and hypercapnia (HCC), Chronic rhinitis, Cigarette smoker, COPD (chronic obstructive pulmonary disease) ([address] Depression, Dysphagia, Essential hypertension, GERD (gastroesophageal reflux disease), Heart failure, diastolic, with acute decompensation ([address] Hepatitis C, chronic ([address] History of smoking at least 1 pack per day for at least 30 years, Hyperlipidemia, Hypertension, Migraine, Moderate COPD (chronic obstructive pulmonary disease) ([address] Multiple transfusions, Neurogenic dysphagia, Osteoarthritis, Pneumonia, Scoliosis, and Substance abuse ([facility] has a past surgical history that includes  section; LEEP (); Upper gastrointestinal endoscopy (); Gastric bypass surgery; Appendectomy (2017); laparoscopic appendectomy (N/A, 3/27/2017); Upper gastrointestinal endoscopy (2018); Upper gastrointestinal endoscopy (N/A, 2018); pr colonoscopy w/biopsy single/multiple (N/A, 11/10/2018); pr egd transoral biopsy single/multiple (N/A, 2018); hip surgery (Right, 2019); and Total hip arthroplasty (Right, 2019).      Social History     Socioeconomic History    Marital status: Single     Spouse name: Not on file    Number of children: Not on file    Years of education: Not on file    Highest education level: Not on file   Occupational History    Not on file   Social Needs    Financial resource strain: Not on file    Food insecurity     Worry: Not on file     Inability: Not on file    Transportation needs     Medical: Not on file     Non-medical: Not on file   Tobacco Use    Smoking status: Former Smoker     Packs/day: 1.00     Years: 40.00     Pack years: 40.00     Types: Cigarettes     Start date:      Last attempt to quit: 2018     Years since quittin.8    Smokeless tobacco: Never Used   Substance and Sexual Activity    Alcohol use: No     Alcohol/week: 0.0 standard drinks    Drug use: No     Types: IV, Cocaine     Comment: past history    Sexual activity: Not on file   Lifestyle    Physical activity     Days per week: Not on file     Minutes per session: Not on file    Stress: Not on file   Relationships    Social connections     Talks on phone: Not on file     Gets together: Not on file     Attends Spiritism service: Not on file     Active member of club or organization: Not on file     Attends meetings of clubs or organizations: Not on file     Relationship status: Not on file    Intimate partner violence     Fear of current or ex partner: Not on file     Emotionally abused: Not on file     Physically abused: Not on file     Forced sexual activity: Not on file   Other Topics Concern    Not on file   Social History Narrative    Not on file       Family History   Problem Relation Age of Onset    Breast Cancer Mother     Heart Disease Father         Allergies:  Aspirin and Keflet [cephalexin]    Home Medications:  Prior to Admission medications    Medication Sig Start Date End Date Taking? Authorizing Provider   apixaban (ELIQUIS) 5 MG TABS tablet Take 1 tablet by mouth 2 times daily 8/7/20   Maggie Mock MD   ibuprofen (ADVIL;MOTRIN) 600 MG tablet Take 1 tablet by mouth 4 times daily as needed for Pain 7/16/20 7/30/20  Chick Rajni, DO   diclofenac (VOLTAREN) 50 MG EC tablet Take 1 tablet by mouth 2 times daily 4/6/20   Elyssa Vidalia, DO   amitriptyline (ELAVIL) 10 MG tablet Take 1 tablet by mouth nightly 1/31/20   Annel Andino, DO   atorvastatin (LIPITOR) 40 MG tablet Take 1 tablet by mouth nightly 1/31/20   Rachel Major, DO   butalbital-acetaminophen-caffeine (FIORICET, ESGIC) -40 MG per tablet Take 1 tablet by mouth every 4 hours as needed for Headaches 1/31/20   Rachel Major, DO   clopidogrel (PLAVIX) 75 MG tablet Take 1 tablet by mouth daily 2/1/20   Annel Andino,    albuterol sulfate HFA (PROVENTIL HFA) 108 (90 Base) MCG/ACT inhaler Inhale 1-2 puffs into the lungs once for 1 dose 1/9/20 6/15/20  Ho Perez MD   ondansetron (ZOFRAN ODT) 4 MG disintegrating tablet Take 1 tablet by mouth every 8 hours as needed for Nausea 9/9/19   Whitney Serrano PA-C   Misc.  Devices [facility]) MISC 1 Device by Does not apply route once for 1 dose 8/5/19 8/5/19  Whitney Serrano PA-C   hydrOXYzine (ATARAX) 25 MG tablet Take 1 tablet by mouth every 8 hours as needed for Itching 8/5/19   Whitney Serrano PA-C   benzonatate (TESSALON PERLES) 100 MG capsule Take 1 capsule by mouth 3 times daily as needed for Cough 3/11/19   Eligio Dickey, DO carvedilol (COREG) 6.25 MG tablet Take 1 tablet by mouth 2 times daily (with meals) 2/7/19   Astrid Banda MD   ferrous sulfate (MELISSA-HAO) 325 (65 Fe) MG tablet Take 1 tablet by mouth daily 2/7/19   Astrid Banda MD   melatonin 1 MG tablet Take 1 tablet by mouth nightly as needed for Sleep 1/11/19   Mervin Sandoval MD   ipratropium-albuterol (DUONEB) 0.5-2.5 (3) MG/3ML SOLN nebulizer solution Inhale 3 mLs into the lungs every 4 hours as needed for Shortness of Breath 1/11/19   Mervin Sandoval MD   mometasone-formoterol [facility]) 100-5 MCG/ACT inhaler Inhale 2 puffs into the lungs 2 times daily 12/5/18   Sp Cullen MD   pantoprazole (PROTONIX) 40 MG tablet Take 1 tablet by mouth 2 times daily (before meals) 11/13/18   Leta Galdamez MD   folic acid (FOLVITE) 1 MG tablet Take 1 tablet by mouth daily 10/13/18   Eva Brown MD   vitamin B-1 100 MG tablet Take 1 tablet by mouth daily 10/13/18   Eva Brown MD   vitamin B-12 250 MCG tablet Take 1 tablet by mouth daily 10/13/18   Eva Brown MD   gabapentin (NEURONTIN) 300 MG capsule Take 300 mg by mouth 3 times daily. Kizzy Chisholm     Historical MD Marnie   Multiple Vitamins-Minerals (THERAPEUTIC MULTIVITAMIN-MINERALS) tablet Take 1 tablet by mouth daily 8/12/18   Veena Forrester MD       REVIEW OFSYSTEMS    (2-9 systems for level 4, 10 or more for level 5)      Constitutional ROS - No recent fevers, No recent chills  Neurological ROS - No Headache, No Syncope  Opthalmologic ROS- No eye pain, No vision changes   ENT ROS - No sore throat, No congestion  Respiratory ROS - No cough, No shortness of breath  Cardiovascular ROS - No chest pain, No palpitations   Gastrointestinal ROS - No abdominal pain, No nausea, No vomiting  Genito-Urinary ROS - +dysuria, Nohematuria  Musculoskeletal ROS - No back pain, No neck pain  Dermatological ROS - No wound, No rash  PHYSICAL EXAM   (up to 7 for level 4, 8 or more forlevel 5)      INITIAL VITALS:   ED Triage Vitals [09/15/20 1045]   BP Temp Temp Source Pulse Resp SpO2 Height Weight   -- -- Oral -- -- -- 4' 11\" (1.499 m) 80 lb (36.3 kg)       Physical Exam  Constitutional:       Appearance: She is well-developed. HENT:      Head: Normocephalic and atraumatic. Eyes:      Pupils: Pupils are equal, round, and reactive to light. Neck:      Musculoskeletal: Normal range of motion and neck supple. Cardiovascular:      Rate and Rhythm: Normal rate and regular rhythm. Pulmonary:      Effort: Pulmonary effort is normal. No respiratory distress. Breath sounds: Normal breath sounds. No stridor. Abdominal:      General: Bowel sounds are normal. There is no distension. Palpations: Abdomen is soft. Musculoskeletal: Normal range of motion. General: No deformity. Comments: Scarring on anterior thigh left side   Skin:     General: Skin is warm and dry. Capillary Refill: Capillary refill takes less than 2 seconds. Comments: Left buttock shows area of darkened skin, no purulence, transcutaneous ultrasound shows no pocket of fluid, low suspicion for abscess versus source of infection, no skin breakdown. Neurological:      Mental Status: She is alert and oriented to person, place, and time.    Psychiatric:         Behavior: Behavior normal.         DIFFERENTIAL  DIAGNOSIS     PLAN (LABS / IMAGING / EKG):  Orders Placed This Encounter   Procedures    Culture, Urine    Urinalysis Reflex to Culture    CBC    BASIC METABOLIC PANEL    Microscopic Urinalysis    Inpatient consult to Internal Medicine    PATIENT STATUS (FROM ED OR OR/PROCEDURAL) Inpatient    PATIENT STATUS (FROM ED OR OR/PROCEDURAL) Inpatient       MEDICATIONS ORDERED:  Orders Placed This Encounter   Medications    ketorolac (TORADOL) injection 15 mg    cefTRIAXone (ROCEPHIN) 1 g IVPB in 50 mL D5W minibag    0.9 % sodium chloride bolus       DDX: Abscess, cellulitis, urinary tract fraction, cystitis, pyelonephritis, nephrolithiasis    Initial MDM/Plan: 77 y.o. female who presents with concern for multiple complaints, 1 is that she has had dysuria, painful urination and urinary frequency for 4 days duration, second complaint is left-sided buttocks pain. Patient states that she has had the buttocks pain for a few days, however no wound, fall or trauma has occurred. On examination appears to be an area of darkened skin color possible cellulitic change versus beginning of pressure sore. Patient has no obvious purulent drainage, signs of skin breakage. We will plan to get urinalysis, CBC and BMP as patient states that she has had a history of kidney stones in the past.  Patient is afebrile, has stable vital signs, does not meet sirs criteria. There is concern of urinary tract infection versus AK I versus pyelonephritis versus nephrolithiasis. DIAGNOSTIC RESULTS / EMERGENCYDEPARTMENT COURSE / MDM     LABS:  Labs Reviewed   URINE RT REFLEX TO CULTURE - Abnormal; Notable for the following components:       Result Value    Turbidity UA TURBID (*)     Urine Hgb MODERATE (*)     Protein, UA 2+ (*)     Nitrite, Urine POSITIVE (*)     Leukocyte Esterase, Urine LARGE (*)     All other components within normal limits   CBC - Abnormal; Notable for the following components:    RDW 15.5 (*)     Platelets 399 (*)     All other components within normal limits   BASIC METABOLIC PANEL - Abnormal; Notable for the following components:    CREATININE 1.26 (*)     Potassium 3.6 (*)     Chloride 112 (*)     CO2 19 (*)     GFR Non- 42 (*)     GFR  51 (*)     All other components within normal limits   MICROSCOPIC URINALYSIS - Abnormal; Notable for the following components:    Bacteria, UA MANY (*)     All other components within normal limits   CULTURE, URINE         RADIOLOGY:  No results found.       EKG  NA     All EKG's are interpreted by the Emergency Department Physicianwho either signs or Co-signs this chart in the absence of a cardiologist.    EMERGENCY DEPARTMENT COURSE:  ED Course as of Sep 15 1504   Tue Sep 15, 2020   1155 Transcutaneous ultrasound performed on the left gluteal area, no pocket of purulence or fluid collection present  concerning for abscess. [ALEXANDER]   1502 Patient's laboratory values have a creatinine of 1.26 this is an increase in baseline from 0.5. We will start 1 L normal saline fluid, start antibiotics and admit to dental medicine services for evaluation and treatment care plan. [ALEXANDER]      ED Course User Index  [ALEXANDER] Elsy Dubon DO           PROCEDURES:  None    CONSULTS:  IP CONSULT TO INTERNAL MEDICINE    CRITICAL CARE:  Please see attending note    FINAL IMPRESSION      1. Dysuria    2. Urinary tract infection with hematuria, site unspecified          DISPOSITION / PLAN     DISPOSITION Admitted 09/15/2020 01:51:15 PM      PATIENT REFERRED TO:  No follow-up provider specified.     DISCHARGE MEDICATIONS:  New Prescriptions    No medications on file       Richi Zuniga DO  Emergency Medicine Resident    (Please note that portions of this note were completed with a voice recognition program.Efforts were made to edit the dictations but occasionally words are mis-transcribed.)        Richi Zuniga DO  Resident  09/15/20 2259

## 2020-09-15 NOTE — ED NOTES
Pt assisted to restroom via wheelchair and educated on how to collect urine specimen      Rica Malone RN  09/15/20 7721

## 2020-09-16 LAB
CULTURE: ABNORMAL
Lab: ABNORMAL
SPECIMEN DESCRIPTION: ABNORMAL

## 2020-09-24 ENCOUNTER — HOSPITAL ENCOUNTER (OUTPATIENT)
Age: 65
Discharge: HOME | End: 2020-09-24
Payer: MEDICARE

## 2020-09-24 PROCEDURE — G0480 DRUG TEST DEF 1-7 CLASSES: HCPCS

## 2020-09-24 PROCEDURE — 80307 DRUG TEST PRSMV CHEM ANLYZR: CPT

## 2020-09-25 LAB
AMPHETAMINE SCREEN URINE: NEGATIVE
BARBITURATE SCREEN URINE: NEGATIVE
BENZODIAZEPINE SCREEN, URINE: NEGATIVE
BUPRENORPHINE URINE: ABNORMAL
CANNABINOID SCREEN URINE: NEGATIVE
COCAINE METABOLITE, URINE: NEGATIVE
MDMA URINE: ABNORMAL
METHADONE SCREEN, URINE: NEGATIVE
METHAMPHETAMINE, URINE: ABNORMAL
OPIATES, URINE: POSITIVE
OXYCODONE SCREEN URINE: NEGATIVE
PHENCYCLIDINE, URINE: NEGATIVE
PROPOXYPHENE, URINE: ABNORMAL
TEST INFORMATION: ABNORMAL
TRICYCLIC ANTIDEPRESSANTS, UR: ABNORMAL

## 2020-09-28 LAB
ODESMETHYLTRAMADOL, URINE: <25 NG/ML
TRAMADOL, URINE: <25 NG/ML

## 2020-09-30 LAB
CODEINE, URINE: >4000 NG/ML
HYDROCODONE, URINE CONFIRMATION: <20 NG/ML
HYDROMORPHONE, URINE CONFIRMATION: <20 NG/ML
MORPHINE, URINE CONFIRMATION: 148 NG/ML
NORHYDROCODONE, URINE: <20 NG/ML
NOROXYCODONE, URINE: <20 NG/ML
NOROXYMORPHONE, URINE: <20 NG/ML
OPIATE, 6-AM URINE: <10 NG/ML
OXYCODONE, URINE CONFIRMATION: <20 NG/ML
OXYMORPHONE, URINE CONFIRMATION: <20 NG/ML

## 2020-10-01 ENCOUNTER — APPOINTMENT (OUTPATIENT)
Dept: GENERAL RADIOLOGY | Age: 65
End: 2020-10-01
Payer: MEDICARE

## 2020-10-01 ENCOUNTER — HOSPITAL ENCOUNTER (EMERGENCY)
Age: 65
Discharge: HOME | End: 2020-10-01
Attending: EMERGENCY MEDICINE
Payer: MEDICARE

## 2020-10-01 VITALS
DIASTOLIC BLOOD PRESSURE: 99 MMHG | RESPIRATION RATE: 20 BRPM | BODY MASS INDEX: 16.16 KG/M2 | WEIGHT: 80 LBS | TEMPERATURE: 99.3 F | HEART RATE: 87 BPM | OXYGEN SATURATION: 100 % | SYSTOLIC BLOOD PRESSURE: 152 MMHG

## 2020-10-01 LAB — GABAPENTIN QNT URINE: <5 UG/ML

## 2020-10-01 PROCEDURE — 99283 EMERGENCY DEPT VISIT LOW MDM: CPT

## 2020-10-01 PROCEDURE — 73502 X-RAY EXAM HIP UNI 2-3 VIEWS: CPT

## 2020-10-01 PROCEDURE — 6370000000 HC RX 637 (ALT 250 FOR IP): Performed by: STUDENT IN AN ORGANIZED HEALTH CARE EDUCATION/TRAINING PROGRAM

## 2020-10-01 RX ORDER — IBUPROFEN 600 MG/1
600 TABLET ORAL 4 TIMES DAILY PRN
Qty: 56 TABLET | Refills: 0 | Status: SHIPPED | OUTPATIENT
Start: 2020-10-01 | End: 2020-11-09 | Stop reason: ALTCHOICE

## 2020-10-01 RX ORDER — ACETAMINOPHEN 500 MG
500 TABLET ORAL 3 TIMES DAILY PRN
Qty: 360 TABLET | Refills: 0 | Status: SHIPPED | OUTPATIENT
Start: 2020-10-01 | End: 2020-10-20 | Stop reason: SDUPTHER

## 2020-10-01 RX ORDER — ACETAMINOPHEN 325 MG/1
650 TABLET ORAL ONCE
Status: COMPLETED | OUTPATIENT
Start: 2020-10-01 | End: 2020-10-01

## 2020-10-01 RX ORDER — IBUPROFEN 400 MG/1
400 TABLET ORAL ONCE
Status: COMPLETED | OUTPATIENT
Start: 2020-10-01 | End: 2020-10-01

## 2020-10-01 RX ADMIN — ACETAMINOPHEN 650 MG: 325 TABLET ORAL at 14:58

## 2020-10-01 RX ADMIN — IBUPROFEN 400 MG: 400 TABLET, FILM COATED ORAL at 14:58

## 2020-10-01 ASSESSMENT — ENCOUNTER SYMPTOMS
FACIAL SWELLING: 0
EYES NEGATIVE: 1
RESPIRATORY NEGATIVE: 1
SINUS PRESSURE: 0
ALLERGIC/IMMUNOLOGIC NEGATIVE: 1
TROUBLE SWALLOWING: 0
SINUS PAIN: 0
GASTROINTESTINAL NEGATIVE: 1
VOICE CHANGE: 0

## 2020-10-01 ASSESSMENT — PAIN DESCRIPTION - ORIENTATION: ORIENTATION: LEFT

## 2020-10-01 ASSESSMENT — PAIN DESCRIPTION - LOCATION: LOCATION: HIP;LEG;KNEE

## 2020-10-01 ASSESSMENT — PAIN SCALES - GENERAL
PAINLEVEL_OUTOF10: 10
PAINLEVEL_OUTOF10: 9

## 2020-10-01 NOTE — ED PROVIDER NOTES
[facility]  ED  Emergency Department Encounter  Emergency Medicine Resident     Pt Name: Jared Rose  TAM:[id number]  [place] 1955  Date of evaluation: 10/1/20  PCP:  No primary care provider on file. CHIEF COMPLAINT       Left hip pain with radiation to the left knee    HISTORY OF PRESENT ILLNESS  (Location/Symptom, Timing/Onset, Context/Setting, Quality, Duration, ModifyingFactors, Severity.)      Emi Daniels is a 77 y.o. female with PMH of Right hip replacement, hypertension and avascular necrosis of the R hip comes to the emergency department complaining about left hip pain that radiates to the Left knee. Patient states that this is been a chronic problem for several months but over the past 5 days is been worsening. Patient states the only reason that she came in today is because she is of difficulty welting and that she ran out of her Motrin. Patient states the pain is 9 out of 10 and radiating down the left lateral side of her leg has been amenable to pain medications and rest relaxation. Patient desires pain control so that she can follow-up with her primary care and her orthopedic surgeon.     PAST MEDICAL / SURGICAL / SOCIAL /FAMILY HISTORY      has a past medical history of Appendicitis, Cerebral artery occlusion with cerebral infarction ([address] CHF (congestive heart failure) (HCC), Chronic respiratory failure with hypoxia and hypercapnia (HCC), Chronic rhinitis, Cigarette smoker, COPD (chronic obstructive pulmonary disease) ([address] Depression, Dysphagia, Essential hypertension, GERD (gastroesophageal reflux disease), Heart failure, diastolic, with acute decompensation ([address] Hepatitis C, chronic ([address] History of smoking at least 1 pack per day for at least 30 years, Hyperlipidemia, Hypertension, Migraine, Moderate COPD (chronic obstructive pulmonary disease) ([address] Multiple transfusions, Neurogenic dysphagia, Osteoarthritis, Pneumonia, Scoliosis, and Substance abuse ([facility] No other pertinent PMH on review with patient/guardian. has a past surgical history that includes  section; LEEP (); Upper gastrointestinal endoscopy (); Gastric bypass surgery; Appendectomy (2017); laparoscopic appendectomy (N/A, 3/27/2017); Upper gastrointestinal endoscopy (2018); Upper gastrointestinal endoscopy (N/A, 2018); pr colonoscopy w/biopsy single/multiple (N/A, 11/10/2018); pr egd transoral biopsy single/multiple (N/A, 2018); hip surgery (Right, 2019); and Total hip arthroplasty (Right, 2019). No other pertinent PSH on review with patient/guardian.   Social History     Socioeconomic History    Marital status: Single     Spouse name: Not on file    Number of children: Not on file    Years of education: Not on file    Highest education level: Not on file   Occupational History    Not on file   Social Needs    Financial resource strain: Not on file    Food insecurity     Worry: Not on file     Inability: Not on file    Transportation needs     Medical: Not on file     Non-medical: Not on file   Tobacco Use    Smoking status: Former Smoker     Packs/day: 1.00     Years: 40.00     Pack years: 40.00     Types: Cigarettes     Start date:      Last attempt to quit: 2018     Years since quittin.9    Smokeless tobacco: Never Used   Substance and Sexual Activity    Alcohol use: No     Alcohol/week: 0.0 standard drinks    Drug use: No     Types: IV, Cocaine     Comment: past history    Sexual activity: Not on file   Lifestyle    Physical activity     Days per week: Not on file     Minutes per session: Not on file    Stress: Not on file   Relationships    Social connections     Talks on phone: Not on file     Gets together: Not on file     Attends Zoroastrianism service: Not on file     Active member of club or organization: Not on file     Attends meetings of clubs or organizations: Not on file     Relationship status: Not on file   Suzy Intimate partner violence     Fear of current or ex partner: Not on file     Emotionally abused: Not on file     Physically abused: Not on file     Forced sexual activity: Not on file   Other Topics Concern    Not on file   Social History Narrative    Not on file       I counseled the patient against using tobacco products. Family History   Problem Relation Age of Onset    Breast Cancer Mother     Heart Disease Father      No other pertinent FamHx on review with patient/guardian. Allergies:  Aspirin and Keflet [cephalexin]    Home Medications:  Prior to Admission medications    Medication Sig Start Date End Date Taking?  Authorizing Provider   acetaminophen (TYLENOL) 500 MG tablet Take 1 tablet by mouth 3 times daily as needed for Pain 10/1/20  Yes Cheyenne Wilson MD   ibuprofen (ADVIL;MOTRIN) 600 MG tablet Take 1 tablet by mouth 4 times daily as needed for Pain 10/1/20 10/15/20 Yes Cheyenne Wilson MD   carvedilol (COREG) 6.25 MG tablet Take 1 tablet by mouth 2 times daily (with meals) 2/7/19  Yes Marialuisa Hill MD   apixaban (ELIQUIS) 5 MG TABS tablet Take 1 tablet by mouth 2 times daily 8/7/20   Ivy Salinas MD   diclofenac (VOLTAREN) 50 MG EC tablet Take 1 tablet by mouth 2 times daily 4/6/20   Gary Salazar DO   amitriptyline (ELAVIL) 10 MG tablet Take 1 tablet by mouth nightly 1/31/20   Savage Crews DO   atorvastatin (LIPITOR) 40 MG tablet Take 1 tablet by mouth nightly 1/31/20   Savage Crews DO   butalbital-acetaminophen-caffeine (FIORICET, ESGIC) -40 MG per tablet Take 1 tablet by mouth every 4 hours as needed for Headaches 1/31/20   Savage Crews DO   clopidogrel (PLAVIX) 75 MG tablet Take 1 tablet by mouth daily 2/1/20   Annel Andino DO   albuterol sulfate HFA (PROVENTIL HFA) 108 (90 Base) MCG/ACT inhaler Inhale 1-2 puffs into the lungs once for 1 dose 1/9/20 6/15/20  Barrington Jackson MD   ondansetron (ZOFRAN ODT) 4 MG disintegrating tablet Take 1 tablet by mouth every 8 hours as needed for Nausea 9/9/19   Teresa Poole PA-C   Mis. Devices [facility]) MISC 1 Device by Does not apply route once for 1 dose 8/5/19 8/5/19  Teresa Poole PA-C   hydrOXYzine (ATARAX) 25 MG tablet Take 1 tablet by mouth every 8 hours as needed for Itching 8/5/19   Teresa Poole PA-C   benzonatate (TESSALON PERLES) 100 MG capsule Take 1 capsule by mouth 3 times daily as needed for Cough 3/11/19   Memo Lopez DO   ferrous sulfate (MELISSA-HAO) 325 (65 Fe) MG tablet Take 1 tablet by mouth daily 2/7/19   Enedina Torres MD   melatonin 1 MG tablet Take 1 tablet by mouth nightly as needed for Sleep 1/11/19   Ivonne Cevallos MD   ipratropium-albuterol (DUONEB) 0.5-2.5 (3) MG/3ML SOLN nebulizer solution Inhale 3 mLs into the lungs every 4 hours as needed for Shortness of Breath 1/11/19   Ivonne Cevallos MD   mometasone-formoterol [facility]) 100-5 MCG/ACT inhaler Inhale 2 puffs into the lungs 2 times daily 12/5/18   Jene Simmonds, MD   pantoprazole (PROTONIX) 40 MG tablet Take 1 tablet by mouth 2 times daily (before meals) 11/13/18   Salvador Wooten MD   folic acid (FOLVITE) 1 MG tablet Take 1 tablet by mouth daily 10/13/18   Viry Bashir MD   vitamin B-1 100 MG tablet Take 1 tablet by mouth daily 10/13/18   Viry Bashir MD   vitamin B-12 250 MCG tablet Take 1 tablet by mouth daily 10/13/18   Viry Bashir MD   gabapentin (NEURONTIN) 300 MG capsule Take 300 mg by mouth 3 times daily. Lou Dye MD   Multiple Vitamins-Minerals (THERAPEUTIC MULTIVITAMIN-MINERALS) tablet Take 1 tablet by mouth daily 8/12/18   Franklin Hernandez MD       REVIEW OF SYSTEMS    (2-9 systems for level 4, 10 ormore for level 5)      Review of Systems   Constitutional: Positive for activity change and appetite change. Negative for chills, diaphoresis, fatigue, fever and unexpected weight change. HENT: Positive for dental problem.  Negative for congestion, drooling, ear discharge, ear pain, facial swelling, hearing loss, mouth sores, nosebleeds, sinus pressure, sinus pain, sneezing, tinnitus, trouble swallowing and voice change. Eyes: Negative. Respiratory: Negative. Cardiovascular: Negative. Gastrointestinal: Negative. Endocrine: Negative. Genitourinary: Negative for difficulty urinating, dysuria, enuresis, flank pain, urgency and vaginal bleeding. Musculoskeletal: Positive for arthralgias and myalgias. Negative for joint swelling, neck pain and neck stiffness. Skin: Negative. Allergic/Immunologic: Negative. Neurological: Negative. Hematological: Negative. Psychiatric/Behavioral: Negative. PHYSICAL EXAM   (up to 7 for level 4, 8 or more for level 5)      INITIAL VITALS:   BP (!) 152/99   Pulse 87   Temp 99.3 °F (37.4 °C) (Oral)   Resp 20   Wt 80 lb (36.3 kg)   LMP 03/18/2015   SpO2 100%   BMI 16.16 kg/m²     Physical Exam  Constitutional:       General: She is not in acute distress. Appearance: She is normal weight. HENT:      Head: Normocephalic and atraumatic. Right Ear: There is impacted cerumen. Left Ear: There is impacted cerumen. Nose: Nose normal.      Mouth/Throat:      Mouth: Mucous membranes are moist.      Pharynx: Oropharynx is clear. Eyes:      Extraocular Movements: Extraocular movements intact. Conjunctiva/sclera: Conjunctivae normal.      Pupils: Pupils are equal, round, and reactive to light. Neck:      Musculoskeletal: Normal range of motion and neck supple. Cardiovascular:      Rate and Rhythm: Normal rate and regular rhythm. Pulses: Normal pulses. Heart sounds: Normal heart sounds. Pulmonary:      Effort: Pulmonary effort is normal.      Breath sounds: Normal breath sounds. Abdominal:      General: Abdomen is flat. Bowel sounds are normal.      Palpations: Abdomen is soft. There is no mass. Tenderness: There is guarding. There is no rebound.       Comments: Patient is guarding just superior to her left hip   Musculoskeletal:      Left hip: She exhibits tenderness, bony tenderness and swelling. Skin:     General: Skin is warm and dry. Capillary Refill: Capillary refill takes less than 2 seconds. Neurological:      General: No focal deficit present. Mental Status: She is alert. Psychiatric:         Mood and Affect: Mood normal.         DIFFERENTIAL  DIAGNOSIS     PLAN (LABS / IMAGING / EKG):  Orders Placed This Encounter   Procedures    XR HIP LEFT (2-3 VIEWS)       MEDICATIONS ORDERED:  Orders Placed This Encounter   Medications    ibuprofen (ADVIL;MOTRIN) tablet 400 mg    acetaminophen (TYLENOL) tablet 650 mg    acetaminophen (TYLENOL) 500 MG tablet     Sig: Take 1 tablet by mouth 3 times daily as needed for Pain     Dispense:  360 tablet     Refill:  0    ibuprofen (ADVIL;MOTRIN) 600 MG tablet     Sig: Take 1 tablet by mouth 4 times daily as needed for Pain     Dispense:  56 tablet     Refill:  0           DIAGNOSTIC RESULTS / EMERGENCY DEPARTMENT COURSE / MDM     LABS:  No results found for this visit on 10/01/20. IMPRESSION/MDM/ED COURSE:  77 y.o. female presented with left hip pain. Standard left hip x-rays were obtained as well as pain control with Tylenol and Motrin given. X-rays showed avascular necrosis of the L femoral head discussion was had about the findings. Pubic surgery was contacted recommending follow-up in their office tomorrow for further work-up and possible surgical discussion. Patient stated that her pain was better but not all the way decreased on her current regimen. Patient was given a prescription for Motrin outpatient with instruction to follow-up with orthopedic surgeon and her primary care doctor. Patient was comfortable with this plan and requested discharge.   Patient was discharged      RADIOLOGY:  XR HIP LEFT (2-3 VIEWS)   Final Result   Severe degenerative changes of the left hip with avascular necrosis of the   left femoral head CONSULTS:  None        FINAL IMPRESSION      1. Avascular necrosis of femoral head, left ([address]    2. Left hip pain    3.  Left leg pain          DISPOSITION / PLAN     DISPOSITION      Home with follow up with Ortho     PATIENT REFERREDTO:  [facility]  [address] [place] [address]  [phone]    As needed, If symptoms worsen    Primary care physician  Please call [phone] to establish care with a primary care physician  Go to       Orthopedic surgeon  Please follow-up with your appointment with your orthopedic surgeon Dr. Hodan Gustafson tomorrow 10/2/2020          DISCHARGE MEDICATIONS:  New Prescriptions    ACETAMINOPHEN (TYLENOL) 500 MG TABLET    Take 1 tablet by mouth 3 times daily as needed for Pain       Aide Pabon MD  PGY 1  Resident Physician Emergency Medicine  10/01/20 3:35 PM        (Please note that portions of this note were completed with a voice recognition program.Efforts were made to edit the dictations but occasionally words are mis-transcribed.)       Aide Pabon MD  Resident  10/01/20 [id number]

## 2020-10-01 NOTE — ED NOTES
Patient states she had right hip surgery last year, states has been having pain to right lateral hip radiating to knee, states has been having on and off, states today it hurts so bad she can hardly walk. Patient states was taking ibuprofen but she ran out. Patient states has been using her walker and sometimes needs people to help her get to bathroom. Patient denies any falls or recent trauma to left hip and leg.       Chapito Majano, RN  10/01/20 [address] [facility] [address] RN  10/01/20 [id number]

## 2020-10-01 NOTE — ED NOTES
Patient reports increased pain from 9 to 10/10 s/p portable xray. Patient given tylenol and motrin. Will reevaluate. Xray results pending. Patient denies any other needs at this time.  Will continue to monitor     Alexsandra Obrien RN  10/01/20 [id number]

## 2020-10-01 NOTE — ED NOTES
Portable xray at bedside     [facility], [address] [place] [address]  10/01/20 1257

## 2020-10-02 ENCOUNTER — OFFICE VISIT (OUTPATIENT)
Dept: ORTHOPEDIC SURGERY | Age: 65
End: 2020-10-02
Payer: MEDICARE

## 2020-10-02 VITALS — BODY MASS INDEX: 16.13 KG/M2 | WEIGHT: 80 LBS | HEIGHT: 59 IN

## 2020-10-02 PROCEDURE — 3017F COLORECTAL CA SCREEN DOC REV: CPT | Performed by: PHYSICIAN ASSISTANT

## 2020-10-02 PROCEDURE — G8400 PT W/DXA NO RESULTS DOC: HCPCS | Performed by: PHYSICIAN ASSISTANT

## 2020-10-02 PROCEDURE — 1036F TOBACCO NON-USER: CPT | Performed by: PHYSICIAN ASSISTANT

## 2020-10-02 PROCEDURE — G8427 DOCREV CUR MEDS BY ELIG CLIN: HCPCS | Performed by: PHYSICIAN ASSISTANT

## 2020-10-02 PROCEDURE — 1123F ACP DISCUSS/DSCN MKR DOCD: CPT | Performed by: PHYSICIAN ASSISTANT

## 2020-10-02 PROCEDURE — 1090F PRES/ABSN URINE INCON ASSESS: CPT | Performed by: PHYSICIAN ASSISTANT

## 2020-10-02 PROCEDURE — 99213 OFFICE O/P EST LOW 20 MIN: CPT | Performed by: PHYSICIAN ASSISTANT

## 2020-10-02 PROCEDURE — G8484 FLU IMMUNIZE NO ADMIN: HCPCS | Performed by: PHYSICIAN ASSISTANT

## 2020-10-02 PROCEDURE — G8419 CALC BMI OUT NRM PARAM NOF/U: HCPCS | Performed by: PHYSICIAN ASSISTANT

## 2020-10-02 PROCEDURE — 4040F PNEUMOC VAC/ADMIN/RCVD: CPT | Performed by: PHYSICIAN ASSISTANT

## 2020-10-02 RX ORDER — TRAMADOL HYDROCHLORIDE 50 MG/1
50 TABLET ORAL EVERY 8 HOURS PRN
Qty: 21 TABLET | Refills: 0 | Status: SHIPPED | OUTPATIENT
Start: 2020-10-02 | End: 2020-10-09

## 2020-10-02 NOTE — PROGRESS NOTES
[facility]  [facility]  [address] [place] [id number]  Dept: [phone]  Dept Fax: [phone]        Ambulatory Follow Up      Subjective:   Emi Arce is a 77y.o. year old female who presents to our office today for routine followup regarding her   1. Arthritis of left hip    2. Avascular necrosis of bone of hip, left ([address]    3. Chronic left hip pain        Chief Complaint   Patient presents with    Hip Pain     F/U  left hip pain       HPI Emi Daniels  is a 77 y.o.  female who presents today in follow for left hip pain. Patient was evaluated at [facility] emergency department on 10/1/2020 for an increase in her left hip discomfort. Patient states that she is having significant discomfort with all range of motion, while seated, and while laying down. Patient had a previous right total hip arthroplasty done by Dr. Estela Marks DO on 8/13/2019 due to significant right hip arthritis and avascular courses of the femoral head. Patient was recently inpatient at [address] [place] [address] due to a pulmonary embolism on 8/2/2020. The patient also has a significant past medical history including COPD, hepatitis C, CVA, chronic migraines, chronic diastolic CHF, previous stroke. Patient states that she has lost a significant amount of weight over the last 6 months to 1 year and is now currently 80 pounds with a BMI of 16.16 today in office. Patient states that she has requested an appetite enhancer from her primary care physician recently that has mildly improved her appetite. Otherwise she only eats small amounts per day. Patient states that she is in immense pain within the left hip and would like to have her left hip replaced. The patient is also requesting pain medication for her left hip.   The patient states that she cannot tolerate NSAID medications due to GI discomfort and due to her compromised liver she cannot take Tylenol. Patient is accompanied by her son today in office who was sleeping on the exam room table during the entire visit. Review of Systems   Constitutional: Negative for activity change and fever. HENT: Negative for sneezing. Respiratory: Negative for cough and shortness of breath. Cardiovascular: Negative for chest pain. Gastrointestinal: Negative for vomiting. Musculoskeletal: Positive for arthralgias (left hip). Negative for joint swelling and myalgias. Skin: Negative for color change. Neurological: Positive for weakness (bilateral lower extremity). Negative for numbness. Psychiatric/Behavioral: Positive for sleep disturbance (d/t left hip pain). Objective :   Ht 4' 11\" (1.499 m)   Wt 80 lb (36.3 kg)   LMP 03/18/2015   BMI 16.16 kg/m²  Body mass index is 16.16 kg/m². General: Emi Daniels is a 77 y.o. female who is alert and oriented and sitting uncomfortably in our office. Patient appears very frail and under nourished. Ortho Exam  MS: Patient arrives seated in a wheelchair and would not stand for a proper evaluation of ambulation. Evaluation of the left hip reveals no erythema, skin warmth, skin lesions, or signs of infection present. Positive hip logroll and Stinchfield test is noted on the left. Significant lower extremity muscle atrophy noted bilaterally. The patient has full range of motion of the Left knee and ankle. Motor, sensory, and vascular examination to the Left lower extremity is intact without focal deficits. Neuro: alert and oriented to person and place. Eyes: Extra-ocular muscles intact  Mouth: Oral mucosa moist. No perioral lesions  Pulm: Respirations unlabored and regular. Symmetric chest excursion without outward deformity is noted. Skin: warm, well perfused  Psych:   Patient has good fund of knowledge and displays understanging of exam, diagnosis, and plan.     Radiology:   Xr Hip Left (2-3 Views)    Result Date: 10/1/2020  EXAMINATION: given today is a 1 week supply only. The patient is to follow-up in office when she is medically cleared for have a left Total Hip Arthroplasty. The patient is to call our office with any questions or concerns. Follow up:Return when cleared medically to have a left BASILIO. Orders Placed This Encounter   Medications    traMADol (ULTRAM) 50 MG tablet     Sig: Take 1 tablet by mouth every 8 hours as needed for Pain for up to 7 days.      Dispense:  21 tablet     Refill:  0     Reduce doses taken as pain becomes manageable         Orders Placed This Encounter   Procedures   Lucrecia Martinez MD, Pain Management, [place]     Referral Priority:   Routine     Referral Type:   Eval and Treat     Referral Reason:   Specialty Services Required     Referred to Provider:   Goyo Moffett MD     Requested Specialty:   Pain Management     Number of Visits Requested:   1       This note is created with the assistance of a speech recognition program.  While intending to generate a document that actually reflects the content of the visit, the document can still have some errors including those of syntax and sound a like substitutions which may escape proof reading.  In such instances, actual meaning can be extrapolated by contextual diversion      Electronically signed by Prashant Alston PA-C on 10/3/2020 at 11:26 AM

## 2020-10-03 ASSESSMENT — ENCOUNTER SYMPTOMS
SHORTNESS OF BREATH: 0
COUGH: 0
COLOR CHANGE: 0
VOMITING: 0

## 2020-10-12 ENCOUNTER — OFFICE VISIT (OUTPATIENT)
Dept: ORTHOPEDIC SURGERY | Age: 65
End: 2020-10-12
Payer: MEDICARE

## 2020-10-12 VITALS — BODY MASS INDEX: 18.35 KG/M2 | WEIGHT: 91 LBS | HEIGHT: 59 IN

## 2020-10-12 PROCEDURE — 1090F PRES/ABSN URINE INCON ASSESS: CPT | Performed by: PHYSICIAN ASSISTANT

## 2020-10-12 PROCEDURE — G8484 FLU IMMUNIZE NO ADMIN: HCPCS | Performed by: PHYSICIAN ASSISTANT

## 2020-10-12 PROCEDURE — 1123F ACP DISCUSS/DSCN MKR DOCD: CPT | Performed by: PHYSICIAN ASSISTANT

## 2020-10-12 PROCEDURE — G8419 CALC BMI OUT NRM PARAM NOF/U: HCPCS | Performed by: PHYSICIAN ASSISTANT

## 2020-10-12 PROCEDURE — 1036F TOBACCO NON-USER: CPT | Performed by: PHYSICIAN ASSISTANT

## 2020-10-12 PROCEDURE — 4040F PNEUMOC VAC/ADMIN/RCVD: CPT | Performed by: PHYSICIAN ASSISTANT

## 2020-10-12 PROCEDURE — 99213 OFFICE O/P EST LOW 20 MIN: CPT | Performed by: PHYSICIAN ASSISTANT

## 2020-10-12 PROCEDURE — 3017F COLORECTAL CA SCREEN DOC REV: CPT | Performed by: PHYSICIAN ASSISTANT

## 2020-10-12 PROCEDURE — G8400 PT W/DXA NO RESULTS DOC: HCPCS | Performed by: PHYSICIAN ASSISTANT

## 2020-10-12 PROCEDURE — G8427 DOCREV CUR MEDS BY ELIG CLIN: HCPCS | Performed by: PHYSICIAN ASSISTANT

## 2020-10-12 RX ORDER — TRAMADOL HYDROCHLORIDE 50 MG/1
50 TABLET ORAL EVERY 8 HOURS PRN
Qty: 12 TABLET | Refills: 0 | Status: SHIPPED | OUTPATIENT
Start: 2020-10-12 | End: 2020-10-16

## 2020-10-12 NOTE — PROGRESS NOTES
oriented and sitting comfortably in our office. Ortho Exam  MS: Patient ambulates independently with a significant antalgic gait noted to the left lower extremity. Patient arrived seated in the wheelchair today due to the fact that she cannot walk long distances. Evaluation of the left hip reveals no erythema, skin warmth, skin lesions, or signs of infection. Positive hip logroll and Stinchfield test is noted on the left. The patient has full range of motion of the Left knee and ankle. Motor, sensory, and vascular examination to the Left lower extremity is intact without focal deficits. Neuro: alert and oriented to person and place. Eyes: Extra-ocular muscles intact  Mouth: Oral mucosa moist. No perioral lesions  Pulm: Respirations unlabored and regular. Symmetric chest excursion without outward deformity is noted. Skin: warm, well perfused  Psych:   Patient has good fund of knowledge and displays understanging of exam, diagnosis, and plan. Radiology:     Xr Hip Left (2-3 Views)    Result Date: 10/1/2020  EXAMINATION: TWO XRAY VIEWS OF THE LEFT HIP 10/1/2020 2:49 pm COMPARISON: None. HISTORY: ORDERING SYSTEM PROVIDED HISTORY: left hip pain TECHNOLOGIST PROVIDED HISTORY: left hip pain Reason for Exam: pain FINDINGS: There are severe degenerative changes of the left hip joint with marked femoroacetabular joint space compromise, sclerosis/spurring with multiple subchondral cysts and increased density/subchondral fracture of the left femoral head. No acute fracture, dislocation or bony destructive lesion     Severe degenerative changes of the left hip with avascular necrosis of the left femoral head       Assessment:      1. Arthritis of left hip    2. Avascular necrosis of bone of hip, left ([address]    3. Chronic left hip pain       Plan: Today in office we discussed etiology and natural history of left hip AVN and arthritis.   At this time we are very aware of the patient's severe left hip pain but due to her significant medical comorbidities including recent pulmonary embolism, COPD, hepatitis C, CVA, chronic migraines, chronic diastolic CHF and previous stroke -we are unable to proceed with left total hip arthroplasty at this time. Patient has a previously scheduled appointment with pain management on 10/16/2020 where she can establish care and medication to help manage her chronic pain. Until then patient was given a prescription for tramadol 50 mg tab every 8 hours as needed for left hip pain for 4 days (12 tabs) - we will not be giving the patient another refill of this pain medication. The patient needs to proceed with her appointment with pain management on 10/16/2020. The patient should continue to modify her activity, use a walker for all ambulation and continue home exercise program for bilateral lower extremity strengthening. Patient was instructed to follow-up in our office when she is medically cleared to proceed with a left total hip arthroplasty. Patient was instructed to call our office with any questions or concerns. Follow up:Return if symptoms worsen or fail to improve. Orders Placed This Encounter   Medications    traMADol (ULTRAM) 50 MG tablet     Sig: Take 1 tablet by mouth every 8 hours as needed for Pain for up to 4 days. Dispense:  12 tablet     Refill:  0     Reduce doses taken as pain becomes manageable       No orders of the defined types were placed in this encounter.     This note is created with the assistance of a speech recognition program.  While intending to generate a document that actually reflects the content of the visit, the document can still have some errors including those of syntax and sound a like substitutions which may escape proof reading.  In such instances, actual meaning can be extrapolated by contextual diversion      Electronically signed by Thuy Carlos PA-C on 10/13/2020 at 8:58 PM

## 2020-10-13 ASSESSMENT — ENCOUNTER SYMPTOMS
SHORTNESS OF BREATH: 0
COLOR CHANGE: 0
COUGH: 0
VOMITING: 0

## 2020-10-20 ENCOUNTER — HOSPITAL ENCOUNTER (EMERGENCY)
Age: 65
Discharge: HOME | End: 2020-10-20
Attending: EMERGENCY MEDICINE
Payer: MEDICARE

## 2020-10-20 ENCOUNTER — APPOINTMENT (OUTPATIENT)
Dept: CT IMAGING | Age: 65
End: 2020-10-20
Payer: MEDICARE

## 2020-10-20 ENCOUNTER — APPOINTMENT (OUTPATIENT)
Dept: GENERAL RADIOLOGY | Age: 65
End: 2020-10-20
Payer: MEDICARE

## 2020-10-20 VITALS
HEART RATE: 98 BPM | SYSTOLIC BLOOD PRESSURE: 157 MMHG | TEMPERATURE: 97.2 F | DIASTOLIC BLOOD PRESSURE: 86 MMHG | OXYGEN SATURATION: 97 % | RESPIRATION RATE: 16 BRPM

## 2020-10-20 LAB
ABSOLUTE EOS #: 0.06 K/UL (ref 0–0.44)
ABSOLUTE IMMATURE GRANULOCYTE: 0.03 K/UL (ref 0–0.3)
ABSOLUTE LYMPH #: 1.65 K/UL (ref 1.1–3.7)
ABSOLUTE MONO #: 0.61 K/UL (ref 0.1–1.2)
ALBUMIN SERPL-MCNC: 3.9 G/DL (ref 3.5–5.2)
ALBUMIN/GLOBULIN RATIO: 1.1 (ref 1–2.5)
ALP BLD-CCNC: 69 U/L (ref 35–104)
ALT SERPL-CCNC: 11 U/L (ref 5–33)
ANION GAP SERPL CALCULATED.3IONS-SCNC: 16 MMOL/L (ref 9–17)
AST SERPL-CCNC: 26 U/L
BASOPHILS # BLD: 0 % (ref 0–2)
BASOPHILS ABSOLUTE: <0.03 K/UL (ref 0–0.2)
BILIRUB SERPL-MCNC: 0.47 MG/DL (ref 0.3–1.2)
BILIRUBIN URINE: NEGATIVE
BUN BLDV-MCNC: 20 MG/DL (ref 8–23)
BUN/CREAT BLD: ABNORMAL (ref 9–20)
CALCIUM SERPL-MCNC: 9.2 MG/DL (ref 8.6–10.4)
CHLORIDE BLD-SCNC: 111 MMOL/L (ref 98–107)
CO2: 17 MMOL/L (ref 20–31)
COLOR: YELLOW
COMMENT UA: ABNORMAL
CREAT SERPL-MCNC: 0.92 MG/DL (ref 0.5–0.9)
DIFFERENTIAL TYPE: ABNORMAL
EOSINOPHILS RELATIVE PERCENT: 1 % (ref 1–4)
GFR AFRICAN AMERICAN: >60 ML/MIN
GFR NON-AFRICAN AMERICAN: >60 ML/MIN
GFR SERPL CREATININE-BSD FRML MDRD: ABNORMAL ML/MIN/{1.73_M2}
GFR SERPL CREATININE-BSD FRML MDRD: ABNORMAL ML/MIN/{1.73_M2}
GLUCOSE BLD-MCNC: 127 MG/DL (ref 70–99)
GLUCOSE URINE: NEGATIVE
HCT VFR BLD CALC: 42.4 % (ref 36.3–47.1)
HEMOGLOBIN: 13.3 G/DL (ref 11.9–15.1)
IMMATURE GRANULOCYTES: 1 %
KETONES, URINE: NEGATIVE
LEUKOCYTE ESTERASE, URINE: NEGATIVE
LIPASE: 49 U/L (ref 13–60)
LYMPHOCYTES # BLD: 37 % (ref 24–43)
MCH RBC QN AUTO: 31.3 PG (ref 25.2–33.5)
MCHC RBC AUTO-ENTMCNC: 31.4 G/DL (ref 28.4–34.8)
MCV RBC AUTO: 99.8 FL (ref 82.6–102.9)
MONOCYTES # BLD: 14 % (ref 3–12)
NITRITE, URINE: NEGATIVE
NRBC AUTOMATED: 0 PER 100 WBC
PDW BLD-RTO: 15.5 % (ref 11.8–14.4)
PH UA: 7 (ref 5–8)
PLATELET # BLD: 421 K/UL (ref 138–453)
PLATELET ESTIMATE: ABNORMAL
PMV BLD AUTO: 8.9 FL (ref 8.1–13.5)
POTASSIUM SERPL-SCNC: 3.7 MMOL/L (ref 3.7–5.3)
PROTEIN UA: NEGATIVE
RBC # BLD: 4.25 M/UL (ref 3.95–5.11)
RBC # BLD: ABNORMAL 10*6/UL
SEG NEUTROPHILS: 47 % (ref 36–65)
SEGMENTED NEUTROPHILS ABSOLUTE COUNT: 2.06 K/UL (ref 1.5–8.1)
SODIUM BLD-SCNC: 144 MMOL/L (ref 135–144)
SPECIFIC GRAVITY UA: 1.05 (ref 1–1.03)
TOTAL PROTEIN: 7.4 G/DL (ref 6.4–8.3)
TROPONIN INTERP: ABNORMAL
TROPONIN T: ABNORMAL NG/ML
TROPONIN, HIGH SENSITIVITY: 15 NG/L (ref 0–14)
TURBIDITY: CLEAR
URINE HGB: NEGATIVE
UROBILINOGEN, URINE: NORMAL
WBC # BLD: 4.4 K/UL (ref 3.5–11.3)
WBC # BLD: ABNORMAL 10*3/UL

## 2020-10-20 PROCEDURE — 83690 ASSAY OF LIPASE: CPT

## 2020-10-20 PROCEDURE — 71045 X-RAY EXAM CHEST 1 VIEW: CPT

## 2020-10-20 PROCEDURE — 73502 X-RAY EXAM HIP UNI 2-3 VIEWS: CPT

## 2020-10-20 PROCEDURE — 96374 THER/PROPH/DIAG INJ IV PUSH: CPT

## 2020-10-20 PROCEDURE — 6370000000 HC RX 637 (ALT 250 FOR IP): Performed by: STUDENT IN AN ORGANIZED HEALTH CARE EDUCATION/TRAINING PROGRAM

## 2020-10-20 PROCEDURE — 99285 EMERGENCY DEPT VISIT HI MDM: CPT

## 2020-10-20 PROCEDURE — 74177 CT ABD & PELVIS W/CONTRAST: CPT

## 2020-10-20 PROCEDURE — 93005 ELECTROCARDIOGRAM TRACING: CPT | Performed by: STUDENT IN AN ORGANIZED HEALTH CARE EDUCATION/TRAINING PROGRAM

## 2020-10-20 PROCEDURE — 6360000002 HC RX W HCPCS: Performed by: STUDENT IN AN ORGANIZED HEALTH CARE EDUCATION/TRAINING PROGRAM

## 2020-10-20 PROCEDURE — 81003 URINALYSIS AUTO W/O SCOPE: CPT

## 2020-10-20 PROCEDURE — 84484 ASSAY OF TROPONIN QUANT: CPT

## 2020-10-20 PROCEDURE — 85025 COMPLETE CBC W/AUTO DIFF WBC: CPT

## 2020-10-20 PROCEDURE — 80053 COMPREHEN METABOLIC PANEL: CPT

## 2020-10-20 PROCEDURE — 6360000004 HC RX CONTRAST MEDICATION: Performed by: STUDENT IN AN ORGANIZED HEALTH CARE EDUCATION/TRAINING PROGRAM

## 2020-10-20 RX ORDER — ACETAMINOPHEN 500 MG
1000 TABLET ORAL ONCE
Status: COMPLETED | OUTPATIENT
Start: 2020-10-20 | End: 2020-10-20

## 2020-10-20 RX ORDER — TRAMADOL HYDROCHLORIDE 50 MG/1
50 TABLET ORAL ONCE
Status: COMPLETED | OUTPATIENT
Start: 2020-10-20 | End: 2020-10-20

## 2020-10-20 RX ORDER — ONDANSETRON 2 MG/ML
4 INJECTION INTRAMUSCULAR; INTRAVENOUS ONCE
Status: COMPLETED | OUTPATIENT
Start: 2020-10-20 | End: 2020-10-20

## 2020-10-20 RX ORDER — ACETAMINOPHEN 500 MG
500 TABLET ORAL 3 TIMES DAILY PRN
Qty: 360 TABLET | Refills: 0 | Status: SHIPPED | OUTPATIENT
Start: 2020-10-20 | End: 2021-03-13 | Stop reason: SDUPTHER

## 2020-10-20 RX ADMIN — IOPAMIDOL 75 ML: 755 INJECTION, SOLUTION INTRAVENOUS at 18:57

## 2020-10-20 RX ADMIN — ACETAMINOPHEN 1000 MG: 500 TABLET ORAL at 17:55

## 2020-10-20 RX ADMIN — ONDANSETRON 4 MG: 2 INJECTION INTRAMUSCULAR; INTRAVENOUS at 17:55

## 2020-10-20 RX ADMIN — TRAMADOL HYDROCHLORIDE 50 MG: 50 TABLET, FILM COATED ORAL at 20:02

## 2020-10-20 ASSESSMENT — ENCOUNTER SYMPTOMS
COUGH: 0
SHORTNESS OF BREATH: 0
DIARRHEA: 0
NAUSEA: 1
BACK PAIN: 0
ABDOMINAL PAIN: 1
WHEEZING: 0
BLOOD IN STOOL: 0
CONSTIPATION: 0
VOMITING: 0

## 2020-10-20 ASSESSMENT — PAIN SCALES - GENERAL
PAINLEVEL_OUTOF10: 7
PAINLEVEL_OUTOF10: 7

## 2020-10-20 NOTE — ED NOTES
Pt to room 18 with c/o bilateral chronic hip pain, right breast pain, and nausea. Pt reports all symptoms have been ongoing x \"a couple weeks. \" Pt has a hx of right sided hip replacement, and is not a surgical candidate. Pt reports she is to follow with a pain clinic, but has not been able to establish an appointment because the next open appointment is in November. Pt reports she has intermittent breast pain that feels like a pinching. Pt placed on continuous pulse ox, and BP. Pt alert and oriented x4, talking in complete sentences, respirations even and unlabored. Pt acting age appropriate. White board updated, will continue to monitor.        Livier Arora RN  10/20/20 [id number]

## 2020-10-20 NOTE — ED PROVIDER NOTES
[facility]     Emergency Department     Faculty Attestation    I performed a history and physical examination of the patient and discussed management with the resident. I reviewed the residents note and agree with the documented findings and plan of care. Any areas of disagreement are noted on the chart. I was personally present for the key portions of any procedures. I have documented in the chart those procedures where I was not present during the key portions. I have reviewed the emergency nurses triage note. I agree with the chief complaint, past medical history, past surgical history, allergies, medications, social and family history as documented unless otherwise noted below. For Physician Assistant/ Nurse Practitioner cases/documentation I have personally evaluated this patient and have completed at least one if not all key elements of the E/M (history, physical exam, and MDM). Additional findings are as noted. I have personally seen and evaluated the patient. I find the patient's history and physical exam are consistent with the NP/PA documentation. I agree with the care provided, treatment rendered, disposition and follow-up plan. The patient is reporting left hip pain which is apparently chronic in nature numerous multiple minor complaints as well however she is here primarily for pain control of her left hip      Critical Care     Elder Lazaro M.D.   Attending Emergency  Physician              King Katerina MD  10/20/20 5509

## 2020-10-20 NOTE — ED PROVIDER NOTES
[facility]  ED  Emergency Department Encounter  Emergency Medicine Resident     Pt Name: Carmina Gaming  MRN: [id number]  Adonaygfsurinder 1955  Date of evaluation: 10/20/20  PCP:  No primary care provider on file. CHIEF COMPLAINT       Chief Complaint   Patient presents with    Hip Pain     bilateral, chronic    Breast Pain     right breast    Nausea     reports nausea after taking medication       HISTORY OFPRESENT ILLNESS  (Location/Symptom, Timing/Onset, Context/Setting, Quality, Duration, Modifying Factors,Severity.)      Emi Daniels is a 78 yo female who presents with multiple complaints. Patient states that she has a history of chronic bilateral hip pain with a prosthetic right hip and a left hip that needs prosthesis however she is not a surgical candidate anymore. States that she is having worsening pain but denies any falls, fevers, chills, rashes. Patient also complaining of discomfort over her right chest which is intermittent with associated shortness of breath and nausea. Patient also complaining of diffuse abdominal discomfort and dysuria. PAST MEDICAL / SURGICAL / SOCIAL / FAMILY HISTORY      has a past medical history of Appendicitis, Cerebral artery occlusion with cerebral infarction ([address] CHF (congestive heart failure) (HCC), Chronic respiratory failure with hypoxia and hypercapnia (HCC), Chronic rhinitis, Cigarette smoker, COPD (chronic obstructive pulmonary disease) ([address] Depression, Dysphagia, Essential hypertension, GERD (gastroesophageal reflux disease), Heart failure, diastolic, with acute decompensation ([address] Hepatitis C, chronic ([address] History of smoking at least 1 pack per day for at least 30 years, Hyperlipidemia, Hypertension, Migraine, Moderate COPD (chronic obstructive pulmonary disease) ([address] Multiple transfusions, Neurogenic dysphagia, Osteoarthritis, Pneumonia, Scoliosis, and Substance abuse ([address]      has a past surgical history that includes  section; LEEP (); Upper gastrointestinal endoscopy (); Gastric bypass surgery; Appendectomy (2017); laparoscopic appendectomy (N/A, 3/27/2017); Upper gastrointestinal endoscopy (2018); Upper gastrointestinal endoscopy (N/A, 2018); pr colonoscopy w/biopsy single/multiple (N/A, 11/10/2018); pr egd transoral biopsy single/multiple (N/A, 2018); hip surgery (Right, 2019); and Total hip arthroplasty (Right, 2019).      Social History     Socioeconomic History    Marital status: Single     Spouse name: Not on file    Number of children: Not on file    Years of education: Not on file    Highest education level: Not on file   Occupational History    Not on file   Social Needs    Financial resource strain: Not on file    Food insecurity     Worry: Not on file     Inability: Not on file    Transportation needs     Medical: Not on file     Non-medical: Not on file   Tobacco Use    Smoking status: Former Smoker     Packs/day: 1.00     Years: 40.00     Pack years: 40.00     Types: Cigarettes     Start date:      Last attempt to quit: 2018     Years since quittin.9    Smokeless tobacco: Never Used   Substance and Sexual Activity    Alcohol use: No     Alcohol/week: 0.0 standard drinks    Drug use: No     Types: IV, Cocaine     Comment: past history    Sexual activity: Not on file   Lifestyle    Physical activity     Days per week: Not on file     Minutes per session: Not on file    Stress: Not on file   Relationships    Social connections     Talks on phone: Not on file     Gets together: Not on file     Attends [employer] service: Not on file     Active member of club or organization: Not on file     Attends meetings of clubs or organizations: Not on file     Relationship status: Not on file    Intimate partner violence     Fear of current or ex partner: Not on file     Emotionally abused: Not on file     Physically abused: Not on file     Forced sexual activity: Not on file   Other Topics Concern    Not on file   Social History Narrative    Not on file       Family History   Problem Relation Age of Onset    Breast Cancer Mother     Heart Disease Father         Allergies:  Aspirin and Keflet [cephalexin]    Home Medications:  Prior to Admission medications    Medication Sig Start Date End Date Taking? Authorizing Provider   acetaminophen (TYLENOL) 500 MG tablet Take 1 tablet by mouth 3 times daily as needed for Pain 10/20/20  Yes Zoran Russell,    ibuprofen (ADVIL;MOTRIN) 600 MG tablet Take 1 tablet by mouth 4 times daily as needed for Pain 10/1/20 10/15/20  Lonnie Agudelo MD   apixaban (ELIQUIS) 5 MG TABS tablet Take 1 tablet by mouth 2 times daily 8/7/20   Nathalie Villagomez MD   diclofenac (VOLTAREN) 50 MG EC tablet Take 1 tablet by mouth 2 times daily 4/6/20   Roman Leone, DO   amitriptyline (ELAVIL) 10 MG tablet Take 1 tablet by mouth nightly 1/31/20   Darrius Padgett, DO   atorvastatin (LIPITOR) 40 MG tablet Take 1 tablet by mouth nightly 1/31/20   Darrius Padgett,    butalbital-acetaminophen-caffeine (FIORICET, ESGIC) -40 MG per tablet Take 1 tablet by mouth every 4 hours as needed for Headaches 1/31/20   Darrius Padgett, DO   clopidogrel (PLAVIX) 75 MG tablet Take 1 tablet by mouth daily 2/1/20   Annel Andino DO   albuterol sulfate HFA (PROVENTIL HFA) 108 (90 Base) MCG/ACT inhaler Inhale 1-2 puffs into the lungs once for 1 dose 1/9/20 6/15/20  Terell Zuniga MD   ondansetron (ZOFRAN ODT) 4 MG disintegrating tablet Take 1 tablet by mouth every 8 hours as needed for Nausea 9/9/19   Dasia Avery PA-C   Misc.  Devices [facility]) MISC 1 Device by Does not apply route once for 1 dose 8/5/19 8/5/19  Dasia Avery PA-C   hydrOXYzine (ATARAX) 25 MG tablet Take 1 tablet by mouth every 8 hours as needed for Itching 8/5/19   Dasia Avery PA-C   benzonatate (TESSALON PERLES) 100 MG capsule Take 1 capsule by mouth 3 times daily as needed for Cough 3/11/19   Isai Lopez DO   carvedilol (COREG) 6.25 MG tablet Take 1 tablet by mouth 2 times daily (with meals) 2/7/19   Cipriano Gray MD   ferrous sulfate (MELISSA-HAO) 325 (65 Fe) MG tablet Take 1 tablet by mouth daily 2/7/19   Cipriano Gray MD   melatonin 1 MG tablet Take 1 tablet by mouth nightly as needed for Sleep 1/11/19   Josie Nazario MD   ipratropium-albuterol (DUONEB) 0.5-2.5 (3) MG/3ML SOLN nebulizer solution Inhale 3 mLs into the lungs every 4 hours as needed for Shortness of Breath 1/11/19   Josie Nazario MD   mometasone-formoterol [facility]) 100-5 MCG/ACT inhaler Inhale 2 puffs into the lungs 2 times daily 12/5/18   Katharine Santillan MD   pantoprazole (PROTONIX) 40 MG tablet Take 1 tablet by mouth 2 times daily (before meals) 11/13/18   Janelle Hines MD   folic acid (FOLVITE) 1 MG tablet Take 1 tablet by mouth daily 10/13/18   Suzi Hopkins MD   vitamin B-1 100 MG tablet Take 1 tablet by mouth daily 10/13/18   Suzi Hopkins MD   vitamin B-12 250 MCG tablet Take 1 tablet by mouth daily 10/13/18   Suzi Hopkins MD   gabapentin (NEURONTIN) 300 MG capsule Take 300 mg by mouth 3 times daily. Beny Beauchamp Historical MD Marnie   Multiple Vitamins-Minerals (THERAPEUTIC MULTIVITAMIN-MINERALS) tablet Take 1 tablet by mouth daily 8/12/18   Jaylen Izaguirre MD       REVIEW OFSYSTEMS    (2-9 systems for level 4, 10 or more for level 5)      Review of Systems   Constitutional: Negative for chills and fever. HENT: Negative. Eyes: Negative for visual disturbance. Respiratory: Negative for cough, shortness of breath and wheezing. Cardiovascular: Positive for chest pain. Negative for palpitations and leg swelling. Gastrointestinal: Positive for abdominal pain and nausea. Negative for blood in stool, constipation, diarrhea and vomiting. Genitourinary: Positive for dysuria. Negative for hematuria. Musculoskeletal: Negative for back pain and neck pain. Bilateral hip pain.    Skin: Negative for rash and wound. Neurological: Negative for dizziness, syncope, weakness, light-headedness, numbness and headaches. PHYSICAL EXAM   (up to 7 for level 4, 8 or more forlevel 5)      INITIAL VITALS:   Vitals:    10/20/20 1711 10/20/20 1734 10/20/20 1745 10/20/20 1801   BP:  (!) 157/86     Pulse:   98    Resp:    16   Temp: 97.2 °F (36.2 °C)      TempSrc: Skin      SpO2:  97%             Physical Exam  Vitals signs and nursing note reviewed. Constitutional:       General: She is not in acute distress. Appearance: Normal appearance. She is not ill-appearing, toxic-appearing or diaphoretic. Cardiovascular:      Rate and Rhythm: Normal rate and regular rhythm. Heart sounds: No murmur. No gallop. Pulmonary:      Effort: Pulmonary effort is normal. No respiratory distress. Breath sounds: Normal breath sounds. No stridor. No wheezing, rhonchi or rales. Abdominal:      General: There is no distension. Palpations: Abdomen is soft. There is no mass. Tenderness: There is abdominal tenderness. There is no guarding or rebound. Hernia: No hernia is present. Comments: Diffuse nonlocalized abdominal tenderness. Musculoskeletal:      Right lower leg: No edema. Left lower leg: No edema. Comments: Tenderness of her bilateral hips without any overlying erythema or warmth. Skin:     General: Skin is warm and dry. Neurological:      Mental Status: She is alert.          DIFFERENTIAL  DIAGNOSIS     PLAN (LABS / IMAGING / EKG):  Orders Placed This Encounter   Procedures    CT ABDOMEN PELVIS W IV CONTRAST Additional Contrast? None    XR CHEST PORTABLE    XR HIP 2-3 VW W PELVIS LEFT    Urinalysis Reflex to Culture    CBC WITH AUTO DIFFERENTIAL    Comprehensive Metabolic Panel w/ Reflex to MG    Troponin    LIPASE    Troponin    EKG 12 Lead       MEDICATIONS ORDERED:  Orders Placed This Encounter   Medications    ondansetron (ZOFRAN) injection 4 mg    acetaminophen (TYLENOL) tablet 1,000 mg    iopamidol (ISOVUE-370) 76 % injection 75 mL    traMADol (ULTRAM) tablet 50 mg    acetaminophen (TYLENOL) 500 MG tablet     Sig: Take 1 tablet by mouth 3 times daily as needed for Pain     Dispense:  360 tablet     Refill:  0       DDX: ACS, arthritis, fracture, UTI, mesenteric ischemia, bowel obstruction    Initial MDM/Plan/ED course: 77 y.o. female who presents with multiple complaints. On exam vitals normal patient is in no acute distress. Physical exam reveals tenderness of the bilateral hips but no overlying erythema or warmth or concerns for septic arthritis. Heart and lung sounds normal, abdomen is diffusely mildly tender throughout without peritoneal signs. Cardiac work-up, abdominal labs, plain films of the hips were obtained as well as urinalysis. Work-up unremarkable and patient discharged home. Symptoms likely secondary to chronic arthritis, she will follow-up with PCP and orthopedics.     DIAGNOSTIC RESULTS / EMERGENCY DEPARTMENT COURSE / MDM     LABS:  Labs Reviewed   URINE RT REFLEX TO CULTURE - Abnormal; Notable for the following components:       Result Value    Specific Gravity, UA 1.046 (*)     All other components within normal limits   CBC WITH AUTO DIFFERENTIAL - Abnormal; Notable for the following components:    RDW 15.5 (*)     Monocytes 14 (*)     Immature Granulocytes 1 (*)     All other components within normal limits   COMPREHENSIVE METABOLIC PANEL W/ REFLEX TO MG FOR LOW K - Abnormal; Notable for the following components:    Glucose 127 (*)     CREATININE 0.92 (*)     Chloride 111 (*)     CO2 17 (*)     All other components within normal limits   TROPONIN - Abnormal; Notable for the following components:    Troponin, High Sensitivity 15 (*)     All other components within normal limits   LIPASE   TROPONIN         RADIOLOGY:  Ct Abdomen Pelvis W Iv Contrast Additional Contrast? None    Result Date: 10/20/2020  EXAMINATION: CT OF THE ABDOMEN AND PELVIS WITH CONTRAST 10/20/2020 6:46 pm TECHNIQUE: CT of the abdomen and pelvis was performed with the administration of intravenous contrast. Multiplanar reformatted images are provided for review. Dose modulation, iterative reconstruction, and/or weight based adjustment of the mA/kV was utilized to reduce the radiation dose to as low as reasonably achievable. COMPARISON: 09/21/2019 and 01/17/2007 HISTORY: ORDERING SYSTEM PROVIDED HISTORY: elderly, diffuse abd tenderness TECHNOLOGIST PROVIDED HISTORY: elderly, diffuse abd tenderness Reason for Exam: diffuse abd pain Acuity: Acute Type of Exam: Initial FINDINGS: Lower Chest: Minimal left basilar dependent atelectasis. Organs: Unremarkable liver, spleen, pancreas, adrenals, bilateral kidneys. GI/Bowel: A stable 2.3 x 1.9 cm rim enhancing lesion with central hypodensity in the lesser sac abutting the superior aspect of the body of the pancreas, unchanged. Suggestion of prior appendectomy. Nonobstructive bowel loops. Pelvis: Grossly unremarkable UB incompletely distended urinary bladder. No adnexal mass. Peritoneum/Retroperitoneum: Mild vascular calcification. No abdominal aortic aneurysm. No free air or free fluid. No adenopathy. Bones/Soft Tissues: Status post right total hip arthroplasty. Avascular necrosis of the left femoral head. Advanced osteoarthritis of the left hip joint with small to moderate size joint effusion. .  Multilevel lumbar spondylosis with mild levoscoliosis. No acute finding in the abdomen and pelvis. Rim enhancing lesion in the lesser sac, unchanged compared to the CT scan dated back to 01/17/2007. This is benign.      Xr Chest Portable    Result Date: 10/20/2020  EXAMINATION: ONE XRAY VIEW OF THE CHEST 10/20/2020 5:23 pm COMPARISON: 08/10/2020 HISTORY: ORDERING SYSTEM PROVIDED HISTORY: CP TECHNOLOGIST PROVIDED HISTORY: CP Reason for Exam: upr FINDINGS: Mild to moderate dextro scoliosis of the thoracolumbar spine was Shila Worthington,   Resident  10/20/20 [id number]

## 2020-10-21 NOTE — ED NOTES
Labeled urine sample obtained and sent to lab via tube system.        [address], RN  10/20/20 2051

## 2020-10-21 NOTE — ED NOTES
Pt resting on stretcher, no respiratory distress noted, pt updated on plan of care, will continue to monitor, call light in reach.        [address], RN  10/20/20 2051

## 2020-10-22 LAB
EKG ATRIAL RATE: 92 BPM
EKG P AXIS: 77 DEGREES
EKG P-R INTERVAL: 156 MS
EKG Q-T INTERVAL: 346 MS
EKG QRS DURATION: 68 MS
EKG QTC CALCULATION (BAZETT): 427 MS
EKG R AXIS: 66 DEGREES
EKG T AXIS: -100 DEGREES
EKG VENTRICULAR RATE: 92 BPM

## 2020-10-22 PROCEDURE — 93010 ELECTROCARDIOGRAM REPORT: CPT | Performed by: INTERNAL MEDICINE

## 2020-11-03 PROBLEM — I63.9 CEREBROVASCULAR ACCIDENT (CVA) (HCC): Status: RESOLVED | Noted: 2019-04-20 | Resolved: 2020-11-03

## 2020-11-03 PROBLEM — J44.9 COPD (CHRONIC OBSTRUCTIVE PULMONARY DISEASE) (HCC): Status: RESOLVED | Noted: 2018-11-04 | Resolved: 2020-11-03

## 2020-11-09 RX ORDER — SENNOSIDES 8.6 MG
8.6 CAPSULE ORAL
COMMUNITY
Start: 2020-08-02 | End: 2021-08-18

## 2020-11-09 ASSESSMENT — ENCOUNTER SYMPTOMS
CONSTIPATION: 1
RESPIRATORY NEGATIVE: 1
EYES NEGATIVE: 1
ALLERGIC/IMMUNOLOGIC NEGATIVE: 1
ABDOMINAL PAIN: 1

## 2020-11-09 NOTE — PROGRESS NOTES
The patient is a 77 y. o. Non-/non  female. Chief Complaint   Patient presents with    Consultation    Knee Pain    Leg Pain        HPI  Requesting physician for the evaluation of Emi Daniels 1955:     Pain History  69-year-old woman with multiple major comorbidities including congestive heart failure, COPD, long-term anticoagulation and antiplatelet therapy, hepatitis C    History of chronic bilateral hip pain  Onset of symptoms many years ago  Pain is constant describes it as aching nagging throbbing pain sensation  Aggravated with standing walking and bending  No radiation of pain in legs  No associated numbness or paresthesia  No changes in bladder or bowel control  Tried conservative measures in past including physical therapy  History of right total hip replacement with some improvement in the right hip pain  Majority of the pain is now in the left hip  She is not considered a surgical candidate because of her comorbidities  She is not interested in any interventional procedure  She has been taking tramadol and find it helpful and want us to continue the medication to manage her pain  She has been out of the medication and reports very high pain score  She is ambulating with a walker  Pain score today  9  1. Location:knees and hip  2. Radiation:bilateral  3. Character:achy  5. Duration:months   6. Onset: month  7. Did an injury cause pain: no  8. Aggravating factors:standing,walking, sitting and bending   9. Alleviating factors:Ibuprofen, Tramadol  10. Associated symptoms (numbness / tingling / weakness):  yes  -Where at:feet  -Down into finger tips or toes (specify which finger or toes):toes  -constant or intermitting: intermitting  11. Red Flags: (weight loss / chills / loss of bladder or bowel control): none    Previous management history  1.  Previous diagnostic workup: (Imaging/EMG)   CT, MRI, or Xray: CT, MRI  What part of the body:chest on down   What facility did they have it at:[facility]  What year or specific date: 10/2020  EMG:  no    2. Previous non interventional treatments tried:  chiropractor or physical therapy: no  What part of the body:n/a  What facility was it done at: n/a  How long ago was it last tried:n/a  Did it work: No  Did they complete it:No    3. Previous Medications tried  NSAID's: yes, Advil   Neurontin: no  Lyrica: no  Trycyclic antidepressant (Ellavil / Pamelor ): no  Cymbalta: no  Opioids (Ultram / Vicodin / Percocet / Morphine / Dilaudid / Oramorph/ Fentanyl etc.):Tramadol  Last Pain medication taken (name of med and date):Tramadol    4. Previous Interventional pain procedures tried:  What kind of injection:no  Who did the injection: no  did the injection help: no  Last time injection was done:N/A    5.  Previous surgeries for pain  What part of the body did they have the surgery no   What physician did the surgery:na  What Facility did they have the surgery done:na  Date of Surgery:N/A    Social History:  Marital status:single  Employment History:retired  Working  No  Full time Or Part time: na  Disability  Yes and No   Legal Issues related to pain complaint: No     Pain Disability Index score : 8          Informant: patient        Past Medical History:   Diagnosis Date    Appendicitis 3/27/2017    [facility]erebral artery occlusion [facility] infarction [facility] [address]     CHF (congestive heart failure) (HCC)     Chronic respiratory failure with hypoxia and hypercapnia (HCC)     Chronic rhinitis     Cigarette smoker     COPD (chronic obstructive pulmonary disease) ([address] 8/5/2018    Depression     Dysphagia     Essential hypertension 8/11/2018    GERD (gastroesophageal reflux disease)     Heart failure, diastolic, with acute decompensation ([address] 9/5/2018    Hepatitis C, chronic ([address]     History of smoking at least 1 pack per day for at least 30 years     Hyperlipidemia     Hypertension     Migraine     Moderate COPD [facility]chronic obstructive pulmonary disease) [facility] [facility]     Multiple transfusions     Neurogenic dysphagia     Osteoarthritis     hands    Pneumonia     Scoliosis     Substance abuse ([facility] [address]     ivda and cocaine abuse in past      Past Surgical History:   Procedure Laterality Date    APPENDECTOMY  2017     SECTION      GASTRIC BYPASS SURGERY      HIP SURGERY Right 2019    HIP TOTAL ARTHROPLASTY ANTERIOR APPROACH - [facility],  C-ARM, (Right )    LAPAROSCOPIC APPENDECTOMY N/A 3/27/2017    APPENDECTOMY LAPAROSCOPIC performed by La Mata MD at [facility]      hgsil    [place] COLONOSCOPY W/BIOPSY SINGLE/MULTIPLE N/A 11/10/2018    COLONOSCOPY WITH BIOPSY performed by Alonso Potts MD at [facility]    [place] EGD TRANSORAL BIOPSY SINGLE/MULTIPLE N/A 2018    EGD ESOPHAGOGASTRODUODENOSCOPY performed by Jaydon Pickard MD at [address] [place] [address] Road Right 2019    HIP TOTAL ARTHROPLASTY ANTERIOR APPROACH - [facility],  C-ARM, performed by Kristina Kenny DO at [facility] [place]      oesophagitis, candidiasis    UPPER GASTROINTESTINAL ENDOSCOPY  2018    UPPER GASTROINTESTINAL ENDOSCOPY N/A 2018    EGD CONTROL HEMORRHAGE performed by Jaydon Pickard MD at [address] [place] [address] History     Socioeconomic History    Marital status: Single     Spouse name: None    Number of children: None    Years of education: None    Highest education level: None   Occupational History    None   Social Needs    Financial resource strain: None    Food insecurity     Worry: None     Inability: None    Transportation needs     Medical: None     Non-medical: None   Tobacco Use    Smoking status: Former Smoker     Packs/day: 1.00     Years: 40.00     Pack years: 40.00     Types: Cigarettes     Start date:      Last attempt to quit: 2018     Years since quittin.0    Smokeless tobacco: Never Used   Substance and Sexual Activity    Alcohol use: No     Alcohol/week: 0.0 standard drinks    Drug use: No     Types: IV, Cocaine     Comment: past history    Sexual activity: None   Lifestyle    Physical activity     Days per week: None     Minutes per session: None    Stress: None   Relationships    Social connections     Talks on phone: None     Gets together: None     Attends [employer] service: None     Active member of club or organization: None     Attends meetings of clubs or organizations: None     Relationship status: None    Intimate partner violence     Fear of current or ex partner: None     Emotionally abused: None     Physically abused: None     Forced sexual activity: None   Other Topics Concern    None   Social History Narrative    None     Family History   Problem Relation Age of Onset    Breast Cancer Mother     Heart Disease Father      Allergies   Allergen Reactions    Aspirin Itching    Keflet [Cephalexin]      Tolerated penicillin V 12/13, tolerates cephalexin 6/14     Aspirin and Keflet [cephalexin]   Vitals:    11/10/20 1101   BP: (!) 158/103   Pulse: 85   Temp: 97 °F (36.1 °C)   SpO2: 97%     Current Outpatient Medications   Medication Sig Dispense Refill    senna (SENOKOT) 8.6 MG tablet Take 1 tablet by mouth 2 times daily as needed for Constipation 60 tablet 0    traMADol (ULTRAM) 50 MG tablet Take 1 tablet by mouth 2 times daily as needed for Pain for up to 30 days.  60 tablet 0    apixaban (ELIQUIS) 5 MG TABS tablet Take 1 tablet by mouth 2 times daily 60 tablet 3    amitriptyline (ELAVIL) 10 MG tablet Take 1 tablet by mouth nightly 30 tablet 3    atorvastatin (LIPITOR) 40 MG tablet Take 1 tablet by mouth nightly 30 tablet 3    butalbital-acetaminophen-caffeine (FIORICET, ESGIC) -40 MG per tablet Take 1 tablet by mouth every 4 hours as needed for Headaches 180 tablet 3    albuterol sulfate HFA (PROVENTIL HFA) 108 (90 Base) MCG/ACT inhaler Inhale 1-2 puffs into the lungs once for 1 dose 1 Inhaler 0    hydrOXYzine (ATARAX) 25 MG tablet Take 1 tablet by mouth every 8 hours as needed for Itching 12 tablet 0    benzonatate (TESSALON PERLES) 100 MG capsule Take 1 capsule by mouth 3 times daily as needed for Cough 10 capsule 0    carvedilol (COREG) 6.25 MG tablet Take 1 tablet by mouth 2 times daily (with meals) 60 tablet 3    ferrous sulfate (MELISSA-HAO) 325 (65 Fe) MG tablet Take 1 tablet by mouth daily 30 tablet 0    ipratropium-albuterol (DUONEB) 0.5-2.5 (3) MG/3ML SOLN nebulizer solution Inhale 3 mLs into the lungs every 4 hours as needed for Shortness of Breath 360 mL 5    mometasone-formoterol (DULERA) 100-5 MCG/ACT inhaler Inhale 2 puffs into the lungs 2 times daily 1 Inhaler 5    pantoprazole (PROTONIX) 40 MG tablet Take 1 tablet by mouth 2 times daily (before meals) 30 tablet 3    vitamin B-1 100 MG tablet Take 1 tablet by mouth daily 30 tablet 3    vitamin B-12 250 MCG tablet Take 1 tablet by mouth daily 30 tablet 3    gabapentin (NEURONTIN) 300 MG capsule Take 300 mg by mouth 3 times daily. Rosy Hamilton Multiple Vitamins-Minerals (THERAPEUTIC MULTIVITAMIN-MINERALS) tablet Take 1 tablet by mouth daily 30 tablet 3    Sennosides (SENNA) 8.6 MG CAPS 8.6 capsules      acetaminophen (TYLENOL) 500 MG tablet Take 1 tablet by mouth 3 times daily as needed for Pain (Patient not taking: Reported on 11/9/2020) 360 tablet 0    diclofenac (VOLTAREN) 50 MG EC tablet Take 1 tablet by mouth 2 times daily (Patient not taking: Reported on 11/9/2020) 60 tablet 3    clopidogrel (PLAVIX) 75 MG tablet Take 1 tablet by mouth daily 30 tablet 3    ondansetron (ZOFRAN ODT) 4 MG disintegrating tablet Take 1 tablet by mouth every 8 hours as needed for Nausea (Patient not taking: Reported on 11/9/2020) 6 tablet 0    Misc.  Devices [facility]) MISC 1 Device by Does not apply route once for 1 dose 1 each 0    melatonin 1 MG tablet Take 1 tablet by mouth nightly as needed for Sleep 20 tablet 0    folic acid (FOLVITE) 1 MG tablet Take 1 tablet by mouth daily 30 pain sensation  Aggravated with standing walking and bending  No radiation of pain in legs  No associated numbness or paresthesia  No changes in bladder or bowel control  Tried conservative measures in past including physical therapy  History of right total hip replacement with some improvement in the right hip pain  Majority of the pain is now in the left hip  She is not considered a surgical candidate because of her comorbidities  She is not interested in any interventional procedure  She has been taking tramadol and find it helpful and want us to continue the medication to manage her pain  She has been out of the medication and reports very high pain score  She is ambulating with a walker  Pain score today  9    EXAMINATION: ONE XRAY VIEW OF THE PELVIS AND TWO XRAY VIEWS LEFT HIP  10/20/2020 5:23 pm   Impression Marked degenerative osteo arthritic changes involve the left hip joint. No acute fracture was identified. 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 Right hip arthroplasty with femoral and acetabular components in their expected positions.     Physical Therapy Discharge Note     Date: 2018                                                  Patient: Susy Donnelly                       : 1955                      MRN: [id number]   Physician: Annamarie Henley MD                             Insurance: [employer]  Medical Diagnosis: scoliosis(M41.9)                        Rehab Codes: acquired scoliosis (M41.9); low back pain (M54.5); muscle weakness (M62.81); abnormal posture (R29.3); spasms of muscle (M62.830)  Onset Date: ~2016                Next 's appt.: Cynthia 2 months from 2018)   Visit# / total visits: (additional 12 visits(6 weeks) referred from Dr. Sujata Keller -- added 12 addt'l visits to count on )             Cancels/No Shows: 3/0  G-codes at visit 20                     Date of initial visit: 18               Date of final visit: 0803/18    1. Primary osteoarthritis of left hip    2. History of total right hip replacement    3. Severe comorbid illness    4. Chronic idiopathic constipation    5. Chronic anticoagulation          I think she can benefit from left hip cortisone injection but she is not interested in any interventional procedures    We will continue her low-dose opioid therapy with tramadol 1 tablet twice a day  No history of illicit substance use  Automated prescription report reviewed  Safe use of medication discussed  We will collect urine for toxicology  Will sign opioid contract    Risk and responsibility associated with opioid use discussed with patient  Side effects and possible complications discussed  Follow-up in 4 weeks    Orders Placed This Encounter   Procedures    Urine Drug Screen, Comprehensive      Orders Placed This Encounter   Medications    senna (SENOKOT) 8.6 MG tablet     Sig: Take 1 tablet by mouth 2 times daily as needed for Constipation     Dispense:  60 tablet     Refill:  0    traMADol (ULTRAM) 50 MG tablet     Sig: Take 1 tablet by mouth 2 times daily as needed for Pain for up to 30 days. Dispense:  60 tablet     Refill:  0     Reduce doses taken as pain becomes manageable      Controlled Substance Monitoring:    Acute and Chronic Pain Monitoring:   RX Monitoring 11/10/2020   Attestation -   Periodic Controlled Substance Monitoring Possible medication side effects, risk of tolerance/dependence & alternative treatments discussed. ;No signs of potential drug abuse or diversion identified. ;Assessed functional status. ;Random urine drug screen sent today. Chronic Pain > 50 MEDD Obtained or confirmed \"Consent for Opioid Use\" on file.            Electronically signed by Acacia Michele MD on 11/10/2020 at 11:27 AM     Urine toxicology Tober 28th 2020  Validity testing satisfactory  Positive for tramadol which is a prescribed medication for her  Positive for codeine morphine and alcohol  Codeine

## 2020-11-10 ENCOUNTER — INITIAL CONSULT (OUTPATIENT)
Dept: PAIN MANAGEMENT | Age: 65
End: 2020-11-10
Payer: MEDICARE

## 2020-11-10 VITALS
WEIGHT: 91 LBS | DIASTOLIC BLOOD PRESSURE: 103 MMHG | HEART RATE: 85 BPM | TEMPERATURE: 97 F | HEIGHT: 59 IN | OXYGEN SATURATION: 97 % | BODY MASS INDEX: 18.35 KG/M2 | SYSTOLIC BLOOD PRESSURE: 158 MMHG

## 2020-11-10 PROBLEM — M16.12 PRIMARY OSTEOARTHRITIS OF LEFT HIP: Status: ACTIVE | Noted: 2020-11-10

## 2020-11-10 PROBLEM — K59.04 CHRONIC IDIOPATHIC CONSTIPATION: Status: ACTIVE | Noted: 2020-11-10

## 2020-11-10 PROBLEM — Z79.01 CHRONIC ANTICOAGULATION: Status: ACTIVE | Noted: 2020-11-10

## 2020-11-10 PROBLEM — R69 SEVERE COMORBID ILLNESS: Status: ACTIVE | Noted: 2020-11-10

## 2020-11-10 PROCEDURE — G8484 FLU IMMUNIZE NO ADMIN: HCPCS | Performed by: ANESTHESIOLOGY

## 2020-11-10 PROCEDURE — G8427 DOCREV CUR MEDS BY ELIG CLIN: HCPCS | Performed by: ANESTHESIOLOGY

## 2020-11-10 PROCEDURE — 99243 OFF/OP CNSLTJ NEW/EST LOW 30: CPT | Performed by: ANESTHESIOLOGY

## 2020-11-10 PROCEDURE — 1090F PRES/ABSN URINE INCON ASSESS: CPT | Performed by: ANESTHESIOLOGY

## 2020-11-10 PROCEDURE — G8419 CALC BMI OUT NRM PARAM NOF/U: HCPCS | Performed by: ANESTHESIOLOGY

## 2020-11-10 RX ORDER — TRAMADOL HYDROCHLORIDE 50 MG/1
50 TABLET ORAL 2 TIMES DAILY PRN
Qty: 60 TABLET | Refills: 0 | Status: SHIPPED | OUTPATIENT
Start: 2020-11-10 | End: 2020-12-08 | Stop reason: SDUPTHER

## 2020-11-10 RX ORDER — SENNA PLUS 8.6 MG/1
1 TABLET ORAL 2 TIMES DAILY PRN
Qty: 60 TABLET | Refills: 0 | Status: SHIPPED | OUTPATIENT
Start: 2020-11-10 | End: 2020-12-08 | Stop reason: SDUPTHER

## 2020-12-08 ENCOUNTER — OFFICE VISIT (OUTPATIENT)
Dept: PAIN MANAGEMENT | Age: 65
End: 2020-12-08
Payer: MEDICARE

## 2020-12-08 VITALS
HEART RATE: 105 BPM | OXYGEN SATURATION: 98 % | SYSTOLIC BLOOD PRESSURE: 150 MMHG | TEMPERATURE: 96.9 F | HEIGHT: 59 IN | BODY MASS INDEX: 17.54 KG/M2 | WEIGHT: 87 LBS | DIASTOLIC BLOOD PRESSURE: 95 MMHG

## 2020-12-08 PROCEDURE — G8419 CALC BMI OUT NRM PARAM NOF/U: HCPCS | Performed by: ANESTHESIOLOGY

## 2020-12-08 PROCEDURE — 1090F PRES/ABSN URINE INCON ASSESS: CPT | Performed by: ANESTHESIOLOGY

## 2020-12-08 PROCEDURE — G8400 PT W/DXA NO RESULTS DOC: HCPCS | Performed by: ANESTHESIOLOGY

## 2020-12-08 PROCEDURE — 4040F PNEUMOC VAC/ADMIN/RCVD: CPT | Performed by: ANESTHESIOLOGY

## 2020-12-08 PROCEDURE — 1123F ACP DISCUSS/DSCN MKR DOCD: CPT | Performed by: ANESTHESIOLOGY

## 2020-12-08 PROCEDURE — 99214 OFFICE O/P EST MOD 30 MIN: CPT | Performed by: ANESTHESIOLOGY

## 2020-12-08 PROCEDURE — G8484 FLU IMMUNIZE NO ADMIN: HCPCS | Performed by: ANESTHESIOLOGY

## 2020-12-08 PROCEDURE — G8427 DOCREV CUR MEDS BY ELIG CLIN: HCPCS | Performed by: ANESTHESIOLOGY

## 2020-12-08 PROCEDURE — 3017F COLORECTAL CA SCREEN DOC REV: CPT | Performed by: ANESTHESIOLOGY

## 2020-12-08 PROCEDURE — 1036F TOBACCO NON-USER: CPT | Performed by: ANESTHESIOLOGY

## 2020-12-08 RX ORDER — OLANZAPINE 10 MG/1
TABLET ORAL
COMMUNITY
Start: 2020-11-03 | End: 2021-03-28 | Stop reason: ALTCHOICE

## 2020-12-08 RX ORDER — TRAMADOL HYDROCHLORIDE 50 MG/1
50 TABLET ORAL 2 TIMES DAILY PRN
Qty: 60 TABLET | Refills: 1 | Status: SHIPPED | OUTPATIENT
Start: 2020-12-08 | End: 2021-02-10 | Stop reason: SDUPTHER

## 2020-12-08 RX ORDER — SENNA PLUS 8.6 MG/1
1 TABLET ORAL DAILY
Qty: 30 TABLET | Refills: 1 | Status: SHIPPED | OUTPATIENT
Start: 2020-12-08 | End: 2021-01-07

## 2020-12-08 RX ORDER — GABAPENTIN 300 MG/1
300 CAPSULE ORAL 3 TIMES DAILY
Qty: 90 CAPSULE | Refills: 1 | Status: SHIPPED | OUTPATIENT
Start: 2020-12-08 | End: 2021-02-10 | Stop reason: SDUPTHER

## 2020-12-08 RX ORDER — MIRTAZAPINE 30 MG/1
TABLET, FILM COATED ORAL
Status: ON HOLD | COMMUNITY
Start: 2020-11-03 | End: 2021-04-06 | Stop reason: SDUPTHER

## 2020-12-08 ASSESSMENT — ENCOUNTER SYMPTOMS
CHEST TIGHTNESS: 1
SHORTNESS OF BREATH: 1

## 2020-12-08 NOTE — PROGRESS NOTES
The patient is a 77 y. o. Non-/non  female. Chief Complaint   Patient presents with    Hip Pain    Medication Refill        HPI      64-year-old woman with history of chronic hip pain  History of right total hip replacement surgery  Continue to have right hip pain  Left hip is more painful than right  Pain aggravated with standing and walking  Located over the anterior hip and extends down the leg  She is currently taking tramadol twice a day  Finds medication helpful keeping her pain in control  Also takes gabapentin and amitriptyline    Past medical history significant for severe congestive heart failure  Urine toxicology collected on last visit on initial consultation was positive for codeine  Patient states that she tried her daughter's pain medication because she was out of her pain medication  Medication Refill: Tramadol     Pain score Today:  9  Adverse effects (Constipation / Nausea / Sedation / sexual Dysfunction / others) : none   Mood: good  Sleep pattern and quality: poor  Activity level: fair    Pill count Today 9 Tramadol per patient   Last dose taken  12/8/20  OARRS report reviewed today: yes  ER/Hospitalizations/PCP visit related to pain since last visit:no   Any legal problems e.g. DUI etc.:No  Satisfied with current management: Yes    Opioid Contract:11/10/20  Last Urine Dug screen dated:11/10/20      Past Medical History, Past Surgical History, Social History, Allergies and Medications reviewed and updated in EPIC as indicated    Family History reviewed and is noncontributory.         Past Medical History:   Diagnosis Date    Appendicitis 3/27/2017    Cerebral artery occlusion with cerebral infarction [facility])     CHF (congestive heart failure) (HCC)     Chronic respiratory failure with hypoxia and hypercapnia (HCC)     Chronic rhinitis     Cigarette smoker     COPD (chronic obstructive pulmonary disease) ([facility] [address] 8/5/2018    Depression     Dysphagia     Essential hypertension Non-medical: None   Tobacco Use    Smoking status: Former Smoker     Packs/day: 1.00     Years: 40.00     Pack years: 40.00     Types: Cigarettes     Start date:      Last attempt to quit: 2018     Years since quittin.1    Smokeless tobacco: Never Used   Substance and Sexual Activity    Alcohol use: No     Alcohol/week: 0.0 standard drinks    Drug use: No     Types: IV, Cocaine     Comment: past history    Sexual activity: None   Lifestyle    Physical activity     Days per week: None     Minutes per session: None    Stress: None   Relationships    Social connections     Talks on phone: None     Gets together: None     Attends Latter day service: None     Active member of club or organization: None     Attends meetings of clubs or organizations: None     Relationship status: None    Intimate partner violence     Fear of current or ex partner: None     Emotionally abused: None     Physically abused: None     Forced sexual activity: None   Other Topics Concern    None   Social History Narrative    None     Family History   Problem Relation Age of Onset    Breast Cancer Mother     Heart Disease Father      Allergies   Allergen Reactions    Aspirin Itching    Keflet [Cephalexin]      Tolerated penicillin V , tolerates cephalexin      Aspirin and Keflet [cephalexin]   Vitals:    20 1616   BP: (!) 150/95   Pulse: 105   Temp: 96.9 °F (36.1 °C)   SpO2: 98%     Current Outpatient Medications   Medication Sig Dispense Refill    traMADol (ULTRAM) 50 MG tablet Take 1 tablet by mouth 2 times daily as needed for Pain for up to 30 days. 60 tablet 1    mirtazapine (REMERON) 30 MG tablet       OLANZapine (ZYPREXA) 10 MG tablet       gabapentin (NEURONTIN) 300 MG capsule Take 1 capsule by mouth 3 times daily for 30 days.  90 capsule 1    senna (SENOKOT) 8.6 MG tablet Take 1 tablet by mouth daily 30 tablet 1    Sennosides (SENNA) 8.6 MG CAPS 8.6 capsules      apixaban (ELIQUIS) 5 MG TABS tablet Take 1 tablet by mouth 2 times daily 60 tablet 3    diclofenac (VOLTAREN) 50 MG EC tablet Take 1 tablet by mouth 2 times daily 60 tablet 3    amitriptyline (ELAVIL) 10 MG tablet Take 1 tablet by mouth nightly 30 tablet 3    atorvastatin (LIPITOR) 40 MG tablet Take 1 tablet by mouth nightly 30 tablet 3    butalbital-acetaminophen-caffeine (FIORICET, ESGIC) -40 MG per tablet Take 1 tablet by mouth every 4 hours as needed for Headaches 180 tablet 3    clopidogrel (PLAVIX) 75 MG tablet Take 1 tablet by mouth daily 30 tablet 3    albuterol sulfate HFA (PROVENTIL HFA) 108 (90 Base) MCG/ACT inhaler Inhale 1-2 puffs into the lungs once for 1 dose 1 Inhaler 0    benzonatate (TESSALON PERLES) 100 MG capsule Take 1 capsule by mouth 3 times daily as needed for Cough 10 capsule 0    carvedilol (COREG) 6.25 MG tablet Take 1 tablet by mouth 2 times daily (with meals) 60 tablet 3    ferrous sulfate (MELISSA-HAO) 325 (65 Fe) MG tablet Take 1 tablet by mouth daily 30 tablet 0    melatonin 1 MG tablet Take 1 tablet by mouth nightly as needed for Sleep 20 tablet 0    mometasone-formoterol (DULERA) 100-5 MCG/ACT inhaler Inhale 2 puffs into the lungs 2 times daily 1 Inhaler 5    folic acid (FOLVITE) 1 MG tablet Take 1 tablet by mouth daily 30 tablet 3    vitamin B-1 100 MG tablet Take 1 tablet by mouth daily 30 tablet 3    vitamin B-12 250 MCG tablet Take 1 tablet by mouth daily 30 tablet 3    Multiple Vitamins-Minerals (THERAPEUTIC MULTIVITAMIN-MINERALS) tablet Take 1 tablet by mouth daily 30 tablet 3    acetaminophen (TYLENOL) 500 MG tablet Take 1 tablet by mouth 3 times daily as needed for Pain (Patient not taking: Reported on 12/8/2020) 360 tablet 0    ondansetron (ZOFRAN ODT) 4 MG disintegrating tablet Take 1 tablet by mouth every 8 hours as needed for Nausea (Patient not taking: Reported on 12/8/2020) 6 tablet 0    Misc.  Devices [facility]) MISC 1 Device by Does not apply route once for 1 dose 1 each 0  hydrOXYzine (ATARAX) 25 MG tablet Take 1 tablet by mouth every 8 hours as needed for Itching (Patient not taking: Reported on 12/8/2020) 12 tablet 0    ipratropium-albuterol (DUONEB) 0.5-2.5 (3) MG/3ML SOLN nebulizer solution Inhale 3 mLs into the lungs every 4 hours as needed for Shortness of Breath (Patient not taking: Reported on 12/8/2020) 360 mL 5    pantoprazole (PROTONIX) 40 MG tablet Take 1 tablet by mouth 2 times daily (before meals) (Patient not taking: Reported on 12/8/2020) 30 tablet 3     No current facility-administered medications for this visit. Review of Systems   Constitutional: Positive for fatigue. Negative for fever. Respiratory: Positive for chest tightness and shortness of breath. Musculoskeletal: Positive for arthralgias. Hip pain    Neurological: Positive for numbness. Objective:  General Appearance:  Well-appearing and in no acute distress. Vital signs: (most recent): Blood pressure (!) 150/95, pulse 105, temperature 96.9 °F (36.1 °C), height 4' 11\" (1.499 m), weight 87 lb (39.5 kg), last menstrual period 03/18/2015, SpO2 98 %. Vital signs are normal.  No fever. Output: Producing urine and producing stool. HEENT: Normal HEENT exam.    Lungs:  Normal effort and normal respiratory rate. Breath sounds clear to auscultation. She is not in respiratory distress. Heart: Normal rate. Extremities: Normal range of motion. There is no deformity. Neurological: Patient is alert and oriented to person, place and time. Patient has normal coordination. Pupils:  Pupils are equal, round, and reactive to light. Pupils are equal.   Skin:  Warm and dry. No rash or cyanosis.         Assessment & Plan     49-year-old woman with history of chronic hip pain  History of right total hip replacement surgery  Continue to have right hip pain  Left hip is more painful than right  Pain aggravated with standing and walking  Located over the anterior hip and extends

## 2021-01-27 ENCOUNTER — TELEPHONE (OUTPATIENT)
Dept: ORTHOPEDIC SURGERY | Age: 66
End: 2021-01-27

## 2021-02-03 ENCOUNTER — TELEPHONE (OUTPATIENT)
Dept: ORTHOPEDIC SURGERY | Age: 66
End: 2021-02-03

## 2021-02-08 ENCOUNTER — TELEPHONE (OUTPATIENT)
Dept: PAIN MANAGEMENT | Age: 66
End: 2021-02-08

## 2021-02-09 ENCOUNTER — HOSPITAL ENCOUNTER (EMERGENCY)
Age: 66
Discharge: HOME | End: 2021-02-09
Attending: EMERGENCY MEDICINE
Payer: MEDICARE

## 2021-02-09 VITALS
DIASTOLIC BLOOD PRESSURE: 108 MMHG | BODY MASS INDEX: 17.17 KG/M2 | HEART RATE: 88 BPM | OXYGEN SATURATION: 100 % | SYSTOLIC BLOOD PRESSURE: 178 MMHG | WEIGHT: 85 LBS | RESPIRATION RATE: 16 BRPM | TEMPERATURE: 97.1 F

## 2021-02-09 DIAGNOSIS — Z76.0 ENCOUNTER FOR MEDICATION REFILL: ICD-10-CM

## 2021-02-09 DIAGNOSIS — M25.551 RIGHT HIP PAIN: Primary | ICD-10-CM

## 2021-02-09 LAB
-: NORMAL
AMORPHOUS: NORMAL
BACTERIA: NORMAL
BILIRUBIN URINE: NEGATIVE
CASTS UA: NORMAL /LPF (ref 0–8)
COLOR: YELLOW
CRYSTALS, UA: NORMAL /HPF
EPITHELIAL CELLS UA: NORMAL /HPF (ref 0–5)
GLUCOSE URINE: NEGATIVE
KETONES, URINE: NEGATIVE
LEUKOCYTE ESTERASE, URINE: NEGATIVE
MUCUS: NORMAL
NITRITE, URINE: NEGATIVE
OTHER OBSERVATIONS UA: NORMAL
PH UA: 7 (ref 5–8)
PROTEIN UA: NEGATIVE
RBC UA: NORMAL /HPF (ref 0–4)
RENAL EPITHELIAL, UA: NORMAL /HPF
SPECIFIC GRAVITY UA: 1.01 (ref 1–1.03)
TRICHOMONAS: NORMAL
TURBIDITY: CLEAR
URINE HGB: NEGATIVE
UROBILINOGEN, URINE: NORMAL
WBC UA: NORMAL /HPF (ref 0–5)
YEAST: NORMAL

## 2021-02-09 PROCEDURE — 99282 EMERGENCY DEPT VISIT SF MDM: CPT

## 2021-02-09 PROCEDURE — 81001 URINALYSIS AUTO W/SCOPE: CPT

## 2021-02-09 PROCEDURE — 6370000000 HC RX 637 (ALT 250 FOR IP): Performed by: STUDENT IN AN ORGANIZED HEALTH CARE EDUCATION/TRAINING PROGRAM

## 2021-02-09 RX ORDER — AMLODIPINE BESYLATE 10 MG/1
10 TABLET ORAL DAILY
Qty: 30 TABLET | Refills: 0 | Status: SHIPPED | OUTPATIENT
Start: 2021-02-09 | End: 2021-02-09 | Stop reason: CLARIF

## 2021-02-09 RX ORDER — TRAMADOL HYDROCHLORIDE 50 MG/1
50 TABLET ORAL ONCE
Status: COMPLETED | OUTPATIENT
Start: 2021-02-09 | End: 2021-02-09

## 2021-02-09 RX ORDER — LISINOPRIL 10 MG/1
10 TABLET ORAL DAILY
Qty: 30 TABLET | Refills: 0 | Status: ON HOLD | OUTPATIENT
Start: 2021-02-09 | End: 2021-04-06 | Stop reason: SDUPTHER

## 2021-02-09 RX ADMIN — TRAMADOL HYDROCHLORIDE 50 MG: 50 TABLET, FILM COATED ORAL at 15:46

## 2021-02-09 ASSESSMENT — PAIN DESCRIPTION - FREQUENCY: FREQUENCY: CONTINUOUS

## 2021-02-09 ASSESSMENT — PAIN DESCRIPTION - PAIN TYPE: TYPE: CHRONIC PAIN

## 2021-02-09 ASSESSMENT — PAIN DESCRIPTION - ONSET: ONSET: ON-GOING

## 2021-02-09 ASSESSMENT — ENCOUNTER SYMPTOMS
EYE PAIN: 0
VOMITING: 0
SORE THROAT: 0
COUGH: 0
NAUSEA: 0
ABDOMINAL PAIN: 0
SHORTNESS OF BREATH: 0
DIARRHEA: 0
BACK PAIN: 0
EYE DISCHARGE: 0
RHINORRHEA: 0

## 2021-02-09 ASSESSMENT — PAIN DESCRIPTION - ORIENTATION: ORIENTATION: LEFT

## 2021-02-09 ASSESSMENT — PAIN DESCRIPTION - DIRECTION: RADIATING_TOWARDS: KNEE

## 2021-02-09 ASSESSMENT — PAIN DESCRIPTION - DESCRIPTORS: DESCRIPTORS: ACHING;CONSTANT

## 2021-02-09 NOTE — ED NOTES
Patient into ER with c/o chronic LLE hip pain, radiating   Reports that she has ongoing issue and pains with hips, which she seen pain management for    Ambulatory to room with cane  Nondistressed  GCS=15  VS stable    Call light within reach  Updated on plan of care and processes  Denies complaints at this time  Will continue to monitor         Madan Mccallum RN  02/09/21 6549

## 2021-02-09 NOTE — ED PROVIDER NOTES
Handoff taken on the following patient from prior Attending Physician:    Pt Name: Collin Ibanez    PCP:  No primary care provider on file. Attestation    I was available and discussed any additional care issues that arose and coordinated the management plans with the resident(s) caring for the patient during my duty period. Any areas of disagreement with residents documentation of care or procedures are noted on the chart. I was personally present for the key portions of any/all procedures during my duty period. I have documented in the chart those procedures where I was not present during the key portions.           Karlo Healy DO  02/09/21 0555

## 2021-02-09 NOTE — ED PROVIDER NOTES
[facility] ED  Emergency Department Encounter  Emergency Medicine Resident     Pt Name: Mert Villavicencio  MRN: [id number]  [place] 1955  Date of evaluation: 2/9/21  PCP:  No primary care provider on file. CHIEF COMPLAINT       Chief Complaint   Patient presents with    Hip Pain     Left hip pain radiating to knee        HISTORY OFPRESENT ILLNESS  (Location/Symptom, Timing/Onset, Context/Setting, Quality, Duration, Modifying Factors,Severity.)      Emi Daniels is a 77 y. o.yo female who presents with chronic left hip pain that has been going on for years. She has had a right hip replacement several years ago, but is not a surgical candidate for a left hip replacement. She sees pain management with Dr. Rosangela García and has run out of her tramadol. She denies any recent falls or traumatic events, no new pain but just her chronic, typical pain. She reports that this pain also radiates to her knee which has been going on for several years but with no new injury. In addition, she complains of urinary frequency. Denies dysuria, hematuria, flank pain, nausea, vomiting, abdominal pain, fevers, chills, fatigue. Additionally, she reports that she has run out of her hypertension medication, but does not remember exactly which ones that she takes. She knows for sure that she takes lisinopril. She denies chest pain, dyspnea, palpitations, abdominal pain radiating to the back.     PAST MEDICAL / SURGICAL / SOCIAL / FAMILY HISTORY      has a past medical history of Appendicitis, Cerebral artery occlusion with cerebral infarction ([address] CHF (congestive heart failure) (HCC), Chronic respiratory failure with hypoxia and hypercapnia (HCC), Chronic rhinitis, Cigarette smoker, COPD (chronic obstructive pulmonary disease) ([address] Depression, Dysphagia, Essential hypertension, GERD (gastroesophageal reflux disease), Heart failure, diastolic, with acute decompensation ([address] Hepatitis C, chronic ([address] History of smoking at least 1 pack per day for at least 30 years, Hyperlipidemia, Hypertension, Migraine, Moderate COPD (chronic obstructive pulmonary disease) ([facility] Multiple transfusions, Neurogenic dysphagia, Osteoarthritis, Pneumonia, Scoliosis, and Substance abuse ([facility] has a past surgical history that includes  section; LEEP (); Upper gastrointestinal endoscopy (); Gastric bypass surgery; Appendectomy (2017); laparoscopic appendectomy (N/A, 3/27/2017); Upper gastrointestinal endoscopy (2018); Upper gastrointestinal endoscopy (N/A, 2018); pr colonoscopy w/biopsy single/multiple (N/A, 11/10/2018); pr egd transoral biopsy single/multiple (N/A, 2018); hip surgery (Right, 2019); and Total hip arthroplasty (Right, 2019). Social:  reports that she quit smoking about 2 years ago. Her smoking use included cigarettes. She started smoking about 51 years ago. She has a 40.00 pack-year smoking history. She has never used smokeless tobacco. She reports that she does not drink alcohol or use drugs. Family Hx:   Family History   Problem Relation Age of Onset    Breast Cancer Mother     Heart Disease Father         Allergies:  Aspirin and Keflet [cephalexin]    Home Medications:  Prior to Admission medications    Medication Sig Start Date End Date Taking? Authorizing Provider   lisinopril (PRINIVIL;ZESTRIL) 10 MG tablet Take 1 tablet by mouth daily 21  Yes Tian Contreras MD   amLODIPine (NORVASC) 10 MG tablet Take 1 tablet by mouth daily 21  Tian Contreras MD   mirtazapine (REMERON) 30 MG tablet  11/3/20   Historical Provider, MD   OLANZapine (ZYPREXA) 10 MG tablet  11/3/20   Historical Provider, MD   gabapentin (NEURONTIN) 300 MG capsule Take 1 capsule by mouth 3 times daily for 30 days.  20  Roselyn Mir MD   Sennosides (SENNA) 8.6 MG CAPS 8.6 capsules 20   Historical Provider, MD   acetaminophen (TYLENOL) 500 MG tablet Take 1 tablet by mouth 3 times daily as needed for Pain  Patient not taking: Reported on 12/8/2020 10/20/20   Vinh Echavarria, DO   apixaban (ELIQUIS) 5 MG TABS tablet Take 1 tablet by mouth 2 times daily 8/7/20   Ronnie Agee MD   diclofenac (VOLTAREN) 50 MG EC tablet Take 1 tablet by mouth 2 times daily 4/6/20   Donnaalec Collier, DO   amitriptyline (ELAVIL) 10 MG tablet Take 1 tablet by mouth nightly 1/31/20   Annel Andino,    atorvastatin (LIPITOR) 40 MG tablet Take 1 tablet by mouth nightly 1/31/20   Mitchel Lala DO   butalbital-acetaminophen-caffeine (FIORICET, ESGIC) -40 MG per tablet Take 1 tablet by mouth every 4 hours as needed for Headaches 1/31/20   Mitchel Lala DO   clopidogrel (PLAVIX) 75 MG tablet Take 1 tablet by mouth daily 2/1/20   Annel Andino DO   albuterol sulfate HFA (PROVENTIL HFA) 108 (90 Base) MCG/ACT inhaler Inhale 1-2 puffs into the lungs once for 1 dose 1/9/20 12/8/20  Vannessa Quinn MD   ondansetron (ZOFRAN ODT) 4 MG disintegrating tablet Take 1 tablet by mouth every 8 hours as needed for Nausea  Patient not taking: Reported on 12/8/2020 9/9/19   Cindy Stearns PA-C   Misc.  Devices [facility]) MISC 1 Device by Does not apply route once for 1 dose 8/5/19 8/5/19  Cindy Stearns PA-C   hydrOXYzine (ATARAX) 25 MG tablet Take 1 tablet by mouth every 8 hours as needed for Itching  Patient not taking: Reported on 12/8/2020 8/5/19   Cindy Stearns PA-C   benzonatate (TESSALON PERLES) 100 MG capsule Take 1 capsule by mouth 3 times daily as needed for Cough 3/11/19   Iris Ganser Wurst, DO   carvedilol (COREG) 6.25 MG tablet Take 1 tablet by mouth 2 times daily (with meals) 2/7/19   Tammy Briscoe MD   ferrous sulfate (MELISSA-HAO) 325 (65 Fe) MG tablet Take 1 tablet by mouth daily 2/7/19   Tammy Briscoe MD   melatonin 1 MG tablet Take 1 tablet by mouth nightly as needed for Sleep 1/11/19   Wilbert Medrano MD   ipratropium-albuterol (DUONEB) 0.5-2.5 (3) MG/3ML SOLN nebulizer solution Inhale 3 mLs into the lungs every 4 hours as needed for Shortness of Breath  Patient not taking: Reported on 12/8/2020 1/11/19   Prabha Velazquez MD   mometasone-formoterol [facility]) 100-5 MCG/ACT inhaler Inhale 2 puffs into the lungs 2 times daily 12/5/18   Nuvia Lyle MD   pantoprazole (PROTONIX) 40 MG tablet Take 1 tablet by mouth 2 times daily (before meals)  Patient not taking: Reported on 12/8/2020 11/13/18   Mikala Cunningham MD   folic acid (FOLVITE) 1 MG tablet Take 1 tablet by mouth daily 10/13/18   Lorena Montoya MD   vitamin B-1 100 MG tablet Take 1 tablet by mouth daily 10/13/18   Lorena Montoya MD   vitamin B-12 250 MCG tablet Take 1 tablet by mouth daily 10/13/18   Lorena Montoya MD   Multiple Vitamins-Minerals (THERAPEUTIC MULTIVITAMIN-MINERALS) tablet Take 1 tablet by mouth daily 8/12/18   Kera Marquez MD       REVIEW OFSYSTEMS    (2-9 systems for level 4, 10 or more for level 5)      Review of Systems   Constitutional: Negative for activity change, chills, fatigue and fever. HENT: Negative for congestion, rhinorrhea, sneezing and sore throat. Eyes: Negative for pain and discharge. Respiratory: Negative for cough and shortness of breath. Cardiovascular: Negative for chest pain. Gastrointestinal: Negative for abdominal pain, diarrhea, nausea and vomiting. Genitourinary: Positive for frequency. Negative for decreased urine volume, dysuria, flank pain and hematuria. Musculoskeletal: Positive for arthralgias. Negative for back pain, neck pain and neck stiffness. Skin: Negative for rash and wound. Neurological: Negative for dizziness, syncope, weakness, light-headedness, numbness and headaches. Psychiatric/Behavioral: Negative for dysphoric mood and suicidal ideas.        PHYSICAL EXAM   (up to 7 for level 4, 8 or more forlevel 5)      INITIAL VITALS:   Vitals:    02/09/21 1503   BP: (!) 178/108   Pulse: 88   Resp: 16   Temp:    SpO2: 100%    BP (!) 178/108 Pulse 88   Temp 97.1 °F (36.2 °C) (Tympanic)   Resp 16   Wt 85 lb (38.6 kg)   LMP 03/18/2015   SpO2 100%   BMI 17.17 kg/m²     Physical Exam  Vitals signs and nursing note reviewed. Constitutional:       General: She is not in acute distress. Appearance: Normal appearance. She is not ill-appearing or toxic-appearing. HENT:      Head: Normocephalic and atraumatic. Nose: Nose normal.      Mouth/Throat:      Mouth: Mucous membranes are moist.      Pharynx: Oropharynx is clear. Eyes:      Extraocular Movements: Extraocular movements intact. Conjunctiva/sclera: Conjunctivae normal.      Pupils: Pupils are equal, round, and reactive to light. Neck:      Musculoskeletal: Normal range of motion and neck supple. Cardiovascular:      Rate and Rhythm: Normal rate and regular rhythm. Pulses: Normal pulses. Heart sounds: Normal heart sounds. Pulmonary:      Effort: Pulmonary effort is normal.      Breath sounds: Normal breath sounds. Abdominal:      General: Bowel sounds are normal. There is no distension. Palpations: Abdomen is soft. Tenderness: There is no abdominal tenderness. There is no right CVA tenderness, left CVA tenderness, guarding or rebound. Musculoskeletal:      Left hip: She exhibits decreased range of motion, tenderness and bony tenderness. She exhibits no swelling and no deformity. Left knee: Normal.   Skin:     General: Skin is warm. Capillary Refill: Capillary refill takes less than 2 seconds. Neurological:      General: No focal deficit present. Mental Status: She is alert and oriented to person, place, and time. Psychiatric:         Mood and Affect: Mood normal.         Behavior: Behavior normal.         DIFFERENTIAL  DIAGNOSIS       Initial MDM/Plan: 77 y.o. female who presents with known arthritis of the left hip with chronic left hip pain. This has been going on for years. No recent falls or traumatic events.   She presents because she has run out of her tramadol. Also reports that she is run out of her antihypertensive but does not know which one she takes. Finally, she complains of urinary frequency but denies other urinary symptoms. Given that she has known chronic arthritis with no recent traumatic injuries, will defer hip x-ray. She is requesting a refill of her tramadol, antihypertensives and also complains of urinary frequency. Will call patient's pain management clinic to coordinate follow-up as an outpatient, attempt to figure out which antihypertensive she is taking, and will order urinalysis. Will give patient 1 tramadol here in the emergency department. DIAGNOSTIC RESULTS / EMERGENCYDEPARTMENT COURSE / MDM     LABS:  Labs Reviewed   URINALYSIS WITH MICROSCOPIC         RADIOLOGY:  No results found. EKG  N/A    EMERGENCY DEPARTMENT COURSE:  ED Course as of Feb 09 1633   Tue Feb 09, 2021   1628 Patient goes to several different pharmacies, has not had her lisinopril filled since September 2020. Patient reports that the name lisinopril sounds familiar to her. Unable to locate a note from a primary care provider that prescribed her this medication, but it does appear in her med list.  We will provide her with 1 month of lisinopril and clinic for establishment of primary care. Additionally, I spoke with pain management clinic and the patient has a follow-up appointment tomorrow afternoon. Will provide patient with 1 tramadol to take in the emergency department and she can receive a refill tomorrow at her follow-up appointment. Urinalysis did not show evidence of urinary tract infection. [JT]      ED Course User Index  [JT] Laureen Grier MD          PROCEDURES:  None    CONSULTS:  None      FINAL IMPRESSION      1. Right hip pain    2.  Encounter for medication refill          DISPOSITION / PLAN     DISPOSITION Decision To Discharge 02/09/2021 04:25:44 PM      PATIENT REFERRED TO:  [address] [place] [address] [facility]  [address] [facility] [address]  [phone]  Go to   If symptoms worsen    [address] [facility]  [facility], [address] [facility] [place] [facility] [place], [address]    Tel: [phone]  In 3 days  As needed      DISCHARGE MEDICATIONS:  Discharge Medication List as of 2/9/2021  4:21 PM      START taking these medications    Details   lisinopril (PRINIVIL;ZESTRIL) 10 MG tablet Take 1 tablet by mouth daily, Disp-30 tablet, R-0Normal             Marybel Zelaya MD  Emergency Medicine Resident    (Please note that portions of this note were completed with a voice recognition program.Efforts were made to edit the dictations but occasionally words are mis-transcribed.)      Marybel Zelaya MD  Resident  02/09/21 [id number]

## 2021-02-09 NOTE — ED PROVIDER NOTES
[facility]     Emergency Department     Faculty Attestation    I performed a history and physical examination of the patient and discussed management with the resident. I have reviewed and agree with the residents findings including all diagnostic interpretations, and treatment plans as written at the time of my review. Any areas of disagreement are noted on the chart. I was personally present for the key portions of any procedures. I have documented in the chart those procedures where I was not present during the key portions. For Physician Assistant/ Nurse Practitioner cases/documentation I have personally evaluated this patient and have completed at least one if not all key elements of the E/M (history, physical exam, and MDM). Additional findings are as noted. This patient was evaluated in the Emergency Department for symptoms described in the history of present illness. The patient was evaluated in the context of the global COVID-19 pandemic, which necessitated consideration that the patient might be at risk for infection with the SARS-CoV-2 virus that causes COVID-19. Institutional protocols and algorithms that pertain to the evaluation of patients at risk for COVID-19 are in a state of rapid change based on information released by regulatory bodies including the CDC and federal and state organizations. These policies and algorithms were followed during the patient's care in the ED. Primary Care Physician: No primary care provider on file. History: This is a 77 y.o. female who presents to the Emergency Department with complaint of chronic hip pain and increase the frequency of urination. Patient has known chronic pain in the left hip. She has run out of her tramadol. She has not had a fall recently. Patient also states she is out of her hypertensive medication as well.   Patient has an appointment tomorrow with her pain management. Physical:   weight is 85 lb (38.6 kg). Her tympanic temperature is 97.1 °F (36.2 °C). Her blood pressure is 178/108 (abnormal) and her pulse is 88. Her respiration is 16 and oxygen saturation is 100%. Patient is awake alert moves all extremities she has some tenderness to palpation along the left hip. Impression: Chronic hip pain, increased frequency of urination    Plan: 1 dose of tramadol, urinalysis    (Please note that portions of this note were completed with a voice recognition program.  Efforts were made to edit the dictations but occasionally words are mis-transcribed.)    Josselyn Wadsworth.  Flavia Koch MD, [address] [facility]  Attending Emergency Medicine Physician        Michael Almodovar MD  02/09/21 1056

## 2021-02-10 ENCOUNTER — OFFICE VISIT (OUTPATIENT)
Dept: PAIN MANAGEMENT | Age: 66
End: 2021-02-10
Payer: MEDICARE

## 2021-02-10 VITALS
DIASTOLIC BLOOD PRESSURE: 94 MMHG | SYSTOLIC BLOOD PRESSURE: 164 MMHG | HEIGHT: 59 IN | BODY MASS INDEX: 17.17 KG/M2 | OXYGEN SATURATION: 97 % | HEART RATE: 82 BPM | TEMPERATURE: 96.8 F

## 2021-02-10 DIAGNOSIS — M16.12 PRIMARY OSTEOARTHRITIS OF LEFT HIP: Primary | ICD-10-CM

## 2021-02-10 DIAGNOSIS — R69 SEVERE COMORBID ILLNESS: ICD-10-CM

## 2021-02-10 DIAGNOSIS — Z79.899 MEDICATION MANAGEMENT: ICD-10-CM

## 2021-02-10 DIAGNOSIS — Z96.641 HISTORY OF TOTAL RIGHT HIP REPLACEMENT: ICD-10-CM

## 2021-02-10 DIAGNOSIS — M51.36 DDD (DEGENERATIVE DISC DISEASE), LUMBAR: ICD-10-CM

## 2021-02-10 PROCEDURE — G8427 DOCREV CUR MEDS BY ELIG CLIN: HCPCS | Performed by: ANESTHESIOLOGY

## 2021-02-10 PROCEDURE — 4040F PNEUMOC VAC/ADMIN/RCVD: CPT | Performed by: ANESTHESIOLOGY

## 2021-02-10 PROCEDURE — 1036F TOBACCO NON-USER: CPT | Performed by: ANESTHESIOLOGY

## 2021-02-10 PROCEDURE — 99214 OFFICE O/P EST MOD 30 MIN: CPT | Performed by: ANESTHESIOLOGY

## 2021-02-10 PROCEDURE — G8400 PT W/DXA NO RESULTS DOC: HCPCS | Performed by: ANESTHESIOLOGY

## 2021-02-10 PROCEDURE — G8419 CALC BMI OUT NRM PARAM NOF/U: HCPCS | Performed by: ANESTHESIOLOGY

## 2021-02-10 PROCEDURE — 3017F COLORECTAL CA SCREEN DOC REV: CPT | Performed by: ANESTHESIOLOGY

## 2021-02-10 PROCEDURE — G8484 FLU IMMUNIZE NO ADMIN: HCPCS | Performed by: ANESTHESIOLOGY

## 2021-02-10 PROCEDURE — 1090F PRES/ABSN URINE INCON ASSESS: CPT | Performed by: ANESTHESIOLOGY

## 2021-02-10 PROCEDURE — 1123F ACP DISCUSS/DSCN MKR DOCD: CPT | Performed by: ANESTHESIOLOGY

## 2021-02-10 RX ORDER — TRAMADOL HYDROCHLORIDE 50 MG/1
50 TABLET ORAL 2 TIMES DAILY PRN
Qty: 60 TABLET | Refills: 1 | Status: SHIPPED | OUTPATIENT
Start: 2021-02-10 | End: 2021-03-12

## 2021-02-10 RX ORDER — GABAPENTIN 300 MG/1
300 CAPSULE ORAL 3 TIMES DAILY
Qty: 90 CAPSULE | Refills: 1 | Status: SHIPPED | OUTPATIENT
Start: 2021-02-10 | End: 2021-03-16 | Stop reason: SDUPTHER

## 2021-02-10 ASSESSMENT — ENCOUNTER SYMPTOMS: RESPIRATORY NEGATIVE: 1

## 2021-02-10 NOTE — PROGRESS NOTES
The patient is a 77 y. o. Non-/non  female. Chief Complaint   Patient presents with    Hip Pain    Medication Refill        HPI    89year-old woman with history of chronic low back and hip pain  Pain is located over both hip  Left hip is more painful than right  History of right total hip replacement surgery but she continued to have right hip pain after surgery  She is diagnosed with severe arthritis of left hip  Pain aggravated with any movement standing and walking  Pain is constant sharp  Currently taking tramadol and gabapentin find it somewhat helpful  Average pain score 8/10  No previous hip injection history  She is not a surgical candidate because of her comorbidities  No side effect from the medication  Medication Refill: Tramadol, Gabapentin    Pain score Today:  8  Adverse effects (Constipation / Nausea / Sedation / sexual Dysfunction / others) : none  Mood: fair  Sleep pattern and quality: poor  Activity level: fair    Pill count Today:0  Last dose taken  2/10 /21  OARRS report reviewed today: yes  ER/Hospitalizations/PCP visit related to pain since last visit:no   Any legal problems e.g. DUI etc.:No  Satisfied with current management: Yes    Opioid Contract:11/10/20  Last Urine Dug screen dated:10/20/20    Lab Results   Component Value Date    LABA1C 5.3 01/27/2020     Lab Results   Component Value Date     01/27/2020       Past Medical History, Past Surgical History, Social History, Allergies and Medications reviewed and updated in EPIC as indicated    Family History reviewed and is noncontributory.         Past Medical History:   Diagnosis Date    Appendicitis 3/27/2017    Cerebral artery occlusion with cerebral infarction [facility])     CHF (congestive heart failure) (HCC)     Chronic respiratory failure with hypoxia and hypercapnia (HCC)     Chronic rhinitis     Cigarette smoker     COPD (chronic obstructive pulmonary disease) ([facility] [address] 8/5/2018    Depression  Dysphagia     Essential hypertension 2018    GERD (gastroesophageal reflux disease)     Heart failure, diastolic, with acute decompensation ([facility] [address] 2018    Hepatitis C, chronic (HCC)     History of smoking at least 1 pack per day for at least 30 years     Hyperlipidemia     Hypertension     Migraine     Moderate COPD (chronic obstructive pulmonary disease) (HCC)     Multiple transfusions     Neurogenic dysphagia     Osteoarthritis     hands    Pneumonia     Scoliosis     Substance abuse ([facility] [address]     ivda and cocaine abuse in past      Past Surgical History:   Procedure Laterality Date    APPENDECTOMY  2017     SECTION      GASTRIC BYPASS SURGERY      HIP SURGERY Right 2019    HIP TOTAL ARTHROPLASTY ANTERIOR APPROACH - [facility],  [facility], (Right )    LAPAROSCOPIC APPENDECTOMY N/A 3/27/2017    APPENDECTOMY LAPAROSCOPIC performed by Nestor Lujan MD at [address] [place] [address]      hgsil    ID COLONOSCOPY W/BIOPSY SINGLE/MULTIPLE N/A 11/10/2018    COLONOSCOPY WITH BIOPSY performed by Rob Villafaan MD at [facility]    ID EGD TRANSORAL BIOPSY SINGLE/MULTIPLE N/A 2018    EGD ESOPHAGOGASTRODUODENOSCOPY performed by Yesica Samson MD at [address] [place] [address] Right 2019    HIP TOTAL ARTHROPLASTY ANTERIOR APPROACH - [facility],  C-ARM, performed by Kinsey Encinas DO at [address]      oesophagitis, candidiasis    UPPER GASTROINTESTINAL ENDOSCOPY  2018    UPPER GASTROINTESTINAL ENDOSCOPY N/A 2018    EGD CONTROL HEMORRHAGE performed by Yesica Samson MD at [address] [place] [address] History     Socioeconomic History    Marital status: Single     Spouse name: None    Number of children: None    Years of education: None    Highest education level: None   Occupational History    None   Social Needs    Financial resource strain: None    Food insecurity     Worry: None     Inability: None  Transportation needs     Medical: None     Non-medical: None   Tobacco Use    Smoking status: Former Smoker     Packs/day: 1.00     Years: 40.00     Pack years: 40.00     Types: Cigarettes     Start date: 5     Quit date: 2018     Years since quittin.2    Smokeless tobacco: Never Used   Substance and Sexual Activity    Alcohol use: No     Alcohol/week: 0.0 standard drinks    Drug use: No     Types: IV, Cocaine     Comment: past history    Sexual activity: None   Lifestyle    Physical activity     Days per week: None     Minutes per session: None    Stress: None   Relationships    Social connections     Talks on phone: None     Gets together: None     Attends Gnosticism service: None     Active member of club or organization: None     Attends meetings of clubs or organizations: None     Relationship status: None    Intimate partner violence     Fear of current or ex partner: None     Emotionally abused: None     Physically abused: None     Forced sexual activity: None   Other Topics Concern    None   Social History Narrative    None     Family History   Problem Relation Age of Onset    Breast Cancer Mother     Heart Disease Father      Allergies   Allergen Reactions    Aspirin Itching    Keflet [Cephalexin]      Tolerated penicillin V , tolerates cephalexin      Aspirin and Keflet [cephalexin]   Vitals:    02/10/21 1439   BP: (!) 164/94   Pulse:    Temp:    SpO2:      Current Outpatient Medications   Medication Sig Dispense Refill    gabapentin (NEURONTIN) 300 MG capsule Take 1 capsule by mouth 3 times daily for 30 days. 90 capsule 1    traMADol (ULTRAM) 50 MG tablet Take 1 tablet by mouth 2 times daily as needed for Pain for up to 30 days.  60 tablet 1    Elastic Bandages & Supports (LUMBAR BACK BRACE/SUPPORT PAD) MISC 1 Units by Does not apply route daily 1 each 0    lisinopril (PRINIVIL;ZESTRIL) 10 MG tablet Take 1 tablet by mouth daily 30 tablet 0  mirtazapine (REMERON) 30 MG tablet       OLANZapine (ZYPREXA) 10 MG tablet       Sennosides (SENNA) 8.6 MG CAPS 8.6 capsules      acetaminophen (TYLENOL) 500 MG tablet Take 1 tablet by mouth 3 times daily as needed for Pain (Patient not taking: Reported on 12/8/2020) 360 tablet 0    apixaban (ELIQUIS) 5 MG TABS tablet Take 1 tablet by mouth 2 times daily 60 tablet 3    diclofenac (VOLTAREN) 50 MG EC tablet Take 1 tablet by mouth 2 times daily 60 tablet 3    amitriptyline (ELAVIL) 10 MG tablet Take 1 tablet by mouth nightly 30 tablet 3    atorvastatin (LIPITOR) 40 MG tablet Take 1 tablet by mouth nightly 30 tablet 3    butalbital-acetaminophen-caffeine (FIORICET, ESGIC) -40 MG per tablet Take 1 tablet by mouth every 4 hours as needed for Headaches 180 tablet 3    clopidogrel (PLAVIX) 75 MG tablet Take 1 tablet by mouth daily 30 tablet 3    albuterol sulfate HFA (PROVENTIL HFA) 108 (90 Base) MCG/ACT inhaler Inhale 1-2 puffs into the lungs once for 1 dose 1 Inhaler 0    ondansetron (ZOFRAN ODT) 4 MG disintegrating tablet Take 1 tablet by mouth every 8 hours as needed for Nausea (Patient not taking: Reported on 12/8/2020) 6 tablet 0    Misc.  Devices [facility]) MISC 1 Device by Does not apply route once for 1 dose 1 each 0    hydrOXYzine (ATARAX) 25 MG tablet Take 1 tablet by mouth every 8 hours as needed for Itching (Patient not taking: Reported on 12/8/2020) 12 tablet 0    benzonatate (TESSALON PERLES) 100 MG capsule Take 1 capsule by mouth 3 times daily as needed for Cough 10 capsule 0    carvedilol (COREG) 6.25 MG tablet Take 1 tablet by mouth 2 times daily (with meals) 60 tablet 3    ferrous sulfate (MELISSA-HAO) 325 (65 Fe) MG tablet Take 1 tablet by mouth daily 30 tablet 0    melatonin 1 MG tablet Take 1 tablet by mouth nightly as needed for Sleep 20 tablet 0  ipratropium-albuterol (DUONEB) 0.5-2.5 (3) MG/3ML SOLN nebulizer solution Inhale 3 mLs into the lungs every 4 hours as needed for Shortness of Breath (Patient not taking: Reported on 12/8/2020) 360 mL 5    mometasone-formoterol (DULERA) 100-5 MCG/ACT inhaler Inhale 2 puffs into the lungs 2 times daily 1 Inhaler 5    pantoprazole (PROTONIX) 40 MG tablet Take 1 tablet by mouth 2 times daily (before meals) (Patient not taking: Reported on 12/8/2020) 30 tablet 3    folic acid (FOLVITE) 1 MG tablet Take 1 tablet by mouth daily 30 tablet 3    vitamin B-1 100 MG tablet Take 1 tablet by mouth daily 30 tablet 3    vitamin B-12 250 MCG tablet Take 1 tablet by mouth daily 30 tablet 3    Multiple Vitamins-Minerals (THERAPEUTIC MULTIVITAMIN-MINERALS) tablet Take 1 tablet by mouth daily 30 tablet 3     No current facility-administered medications for this visit. Review of Systems   Constitutional: Positive for fatigue. Negative for fever. Respiratory: Negative. Musculoskeletal: Positive for arthralgias, joint swelling and myalgias. Neurological: Positive for weakness. Objective:  General Appearance:  Uncomfortable and in pain. Vital signs: (most recent): Blood pressure (!) 164/94, pulse 82, temperature 96.8 °F (36 °C), temperature source Infrared, height 4' 11\" (1.499 m), last menstrual period 03/18/2015, SpO2 97 %. Vital signs are normal.  No fever. Output: Producing urine and producing stool. HEENT: Normal HEENT exam.    Lungs:  Normal effort and normal respiratory rate. Breath sounds clear to auscultation. She is not in respiratory distress. Heart: Normal rate. Regular rhythm. Extremities: Decreased range of motion. There is deformity. Neurological: Patient is alert and oriented to person, place and time. Patient has normal coordination. Pupils:  Pupils are equal, round, and reactive to light. Pupils are equal.   Skin:  Warm and dry. No rash or cyanosis.

## 2021-03-02 ENCOUNTER — APPOINTMENT (OUTPATIENT)
Dept: GENERAL RADIOLOGY | Age: 66
End: 2021-03-02
Payer: MEDICARE

## 2021-03-02 ENCOUNTER — HOSPITAL ENCOUNTER (EMERGENCY)
Age: 66
Discharge: HOME | End: 2021-03-02
Attending: EMERGENCY MEDICINE
Payer: MEDICARE

## 2021-03-02 VITALS
OXYGEN SATURATION: 98 % | HEART RATE: 99 BPM | DIASTOLIC BLOOD PRESSURE: 97 MMHG | RESPIRATION RATE: 18 BRPM | TEMPERATURE: 99.4 F | SYSTOLIC BLOOD PRESSURE: 147 MMHG | HEIGHT: 59 IN | BODY MASS INDEX: 17.17 KG/M2

## 2021-03-02 DIAGNOSIS — M87.052 AVASCULAR NECROSIS OF BONE OF LEFT HIP (HCC): ICD-10-CM

## 2021-03-02 DIAGNOSIS — G43.809 OTHER MIGRAINE WITHOUT STATUS MIGRAINOSUS, NOT INTRACTABLE: Primary | ICD-10-CM

## 2021-03-02 PROCEDURE — 99283 EMERGENCY DEPT VISIT LOW MDM: CPT

## 2021-03-02 PROCEDURE — 6370000000 HC RX 637 (ALT 250 FOR IP): Performed by: STUDENT IN AN ORGANIZED HEALTH CARE EDUCATION/TRAINING PROGRAM

## 2021-03-02 PROCEDURE — 73502 X-RAY EXAM HIP UNI 2-3 VIEWS: CPT

## 2021-03-02 RX ORDER — BUTALBITAL, ACETAMINOPHEN AND CAFFEINE 50; 325; 40 MG/1; MG/1; MG/1
1 TABLET ORAL EVERY 6 HOURS PRN
Qty: 6 TABLET | Refills: 0 | Status: SHIPPED | OUTPATIENT
Start: 2021-03-02 | End: 2021-03-18 | Stop reason: SDUPTHER

## 2021-03-02 RX ORDER — BUTALBITAL, ACETAMINOPHEN AND CAFFEINE 50; 325; 40 MG/1; MG/1; MG/1
1 TABLET ORAL ONCE
Status: COMPLETED | OUTPATIENT
Start: 2021-03-02 | End: 2021-03-02

## 2021-03-02 RX ORDER — BUTALBITAL, ACETAMINOPHEN AND CAFFEINE 50; 325; 40 MG/1; MG/1; MG/1
1 TABLET ORAL EVERY 6 HOURS PRN
Qty: 6 TABLET | Refills: 0 | Status: SHIPPED | OUTPATIENT
Start: 2021-03-02 | End: 2021-03-02 | Stop reason: SDUPTHER

## 2021-03-02 RX ADMIN — BUTALBITAL, ACETAMINOPHEN AND CAFFEINE 1 TABLET: 50; 325; 40 TABLET ORAL at 14:22

## 2021-03-02 NOTE — ED NOTES
SW took patient to the [place] [address] [facility], by wheelchair, to fill her script. Patient will then be taken to the [facility] for her ride home. Violeta Myers.  Delio Reynolds     [email or web address], [place]  03/02/21 7250

## 2021-03-02 NOTE — ED PROVIDER NOTES
[facility]     Emergency Department     Faculty Attestation        I performed a history and physical examination of the patient and discussed management with the resident. I reviewed the residents note and agree with the documented findings and plan of care. Any areas of disagreement are noted on the chart. I was personally present for the key portions of any procedures. I have documented in the chart those procedures where I was not present during the key portions. I have reviewed the emergency nurses triage note. I agree with the chief complaint, past medical history, past surgical history, allergies, medications, social and family history as documented unless otherwise noted below. For mid-level providers such as nurse practitioners as well as physicians assistants:    I have personally seen and evaluated the patient. I find the patient's history and physical exam are consistent with NP/PA documentation. I agree with the care provided, treatment rendered, disposition, & follow-up plan. Additional findings are as noted. Vital Signs: BP (!) 147/97   Pulse 99   Temp 99.4 °F (37.4 °C) (Oral)   Resp 18   Ht 4' 11\" (1.499 m)   LMP 03/18/2015   SpO2 98%   BMI 17.17 kg/m²   PCP:  No primary care provider on file. Pertinent Comments:     Patient presents to the emergency department for evaluation of migraine headache. Was gradual in onset and typical for by dreams. Not the worst headache of her life. No falls or trauma. No alcohol use. She does have Eliquis listed on her med list but states she is taking this. She has no chest pain cough or shortness of breath or any other complaint is left hip pain which is chronic. Again no falls. Able walk and ambulate with no assistance.   Afebrile nontoxic with no focal neurological deficits on exam sitting up watching TV      Critical Care  None          Khurram Foreman MD  Attending Emergency Medicine Physician              Beny Conteh MD  03/02/21 2318

## 2021-03-03 ASSESSMENT — ENCOUNTER SYMPTOMS
SHORTNESS OF BREATH: 0
RHINORRHEA: 0
COUGH: 0
ABDOMINAL PAIN: 0
VOMITING: 0
NAUSEA: 0

## 2021-03-03 NOTE — ED PROVIDER NOTES
[facility]  Emergency Department Encounter  Emergency Medicine Resident     Pt Name: Sammie Jeffery  MRN: [id number]  [place] 1955  Date of evaluation: 3/3/21  PCP:  No primary care provider on file. CHIEF COMPLAINT       Chief Complaint   Patient presents with    Migraine     3 day     Hip Pain     left       HISTORY OF PRESENT ILLNESS  (Location/Symptom, Timing/Onset, Context/Setting, Quality, Duration, Modifying Factors, Severity.)    Emi Daniels is a 77 y.o. female who presents with a migraine ongoing for the past 3 days as well as left hip pain. Patient states she has chronic left pain but notes that it got worse over the past day after she had to do a fair amount of walking per patient. States that she usually takes Fioricet for her migraines but ran out of some several weeks ago. States that this feels like her usual migraine, not thunderclap, gradual onset. States is not the worst headache of her life. States that she has been eating and drinking as normally. Denies any trauma to her left hip. She states that she supposed to have her left hip replaced but that her orthopedic surgeon believes she is not stable enough for surgery.     PPE Worn:  Gloves: Yes  Eye Protection: Goggles  Mask: N95  Gown: NO    PAST MEDICAL / SURGICAL / SOCIAL / FAMILY HISTORY    has a past medical history of Appendicitis, Cerebral artery occlusion with cerebral infarction ([address] CHF (congestive heart failure) ([address] Chronic respiratory failure with hypoxia and hypercapnia (HCC), Chronic rhinitis, Cigarette smoker, COPD (chronic obstructive pulmonary disease) ([address] Depression, Dysphagia, Essential hypertension, GERD (gastroesophageal reflux disease), Heart failure, diastolic, with acute decompensation ([address] Hepatitis C, chronic ([address] History of smoking at least 1 pack per day for at least 30 years, Hyperlipidemia, Hypertension, Migraine, Moderate COPD (chronic obstructive pulmonary disease) [facility]), Multiple transfusions, Neurogenic dysphagia, Osteoarthritis, Pneumonia, Scoliosis, and Substance abuse ([facility] [address] [facility] has a past surgical history that includes  section; LEEP (); Upper gastrointestinal endoscopy (); Gastric bypass surgery; Appendectomy (2017); laparoscopic appendectomy (N/A, 3/27/2017); Upper gastrointestinal endoscopy (2018); Upper gastrointestinal endoscopy (N/A, 2018); pr colonoscopy w/biopsy single/multiple (N/A, 11/10/2018); pr egd transoral biopsy single/multiple (N/A, 2018); hip surgery (Right, 2019); and Total hip arthroplasty (Right, 2019).     Social History     Socioeconomic History    Marital status: Single     Spouse name: Not on file    Number of children: Not on file    Years of education: Not on file    Highest education level: Not on file   Occupational History    Not on file   Social Needs    Financial resource strain: Not on file    Food insecurity     Worry: Not on file     Inability: Not on file    Transportation needs     Medical: Not on file     Non-medical: Not on file   Tobacco Use    Smoking status: Former Smoker     Packs/day: 1.00     Years: 40.00     Pack years: 40.00     Types: Cigarettes     Start date:      Quit date: 2018     Years since quittin.3    Smokeless tobacco: Never Used   Substance and Sexual Activity    Alcohol use: No     Alcohol/week: 0.0 standard drinks    Drug use: No     Types: IV, Cocaine     Comment: past history    Sexual activity: Not on file   Lifestyle    Physical activity     Days per week: Not on file     Minutes per session: Not on file    Stress: Not on file   Relationships    Social connections     Talks on phone: Not on file     Gets together: Not on file     Attends [employer] service: Not on file     Active member of club or organization: Not on file     Attends meetings of clubs or organizations: Not on file     Relationship status: Not on file    Intimate partner violence     Fear of current or ex partner: Not on file     Emotionally abused: Not on file     Physically abused: Not on file     Forced sexual activity: Not on file   Other Topics Concern    Not on file   Social History Narrative    Not on file       Family History   Problem Relation Age of Onset    Breast Cancer Mother     Heart Disease Father        Allergies:    Aspirin and Keflet [cephalexin]    Home Medications:  Prior to Admission medications    Medication Sig Start Date End Date Taking? Authorizing Provider   butalbital-acetaminophen-caffeine (FIORICET, ESGIC) -20 MG per tablet Take 1 tablet by mouth every 6 hours as needed for Headaches or Migraine 3/2/21  Yes Corina Coates MD   gabapentin (NEURONTIN) 300 MG capsule Take 1 capsule by mouth 3 times daily for 30 days. 2/10/21 3/12/21  Deangelo Hutton MD   traMADol (ULTRAM) 50 MG tablet Take 1 tablet by mouth 2 times daily as needed for Pain for up to 30 days.  2/10/21 3/12/21  Deangelo Hutton MD   Elastic Bandages & Supports (LUMBAR BACK BRACE/SUPPORT PAD) MISC 1 Units by Does not apply route daily 2/10/21 2/10/22  Deangelo Hutton MD   lisinopril (PRINIVIL;ZESTRIL) 10 MG tablet Take 1 tablet by mouth daily 2/9/21   Deirdre Jacobo MD   amLODIPine (NORVASC) 10 MG tablet Take 1 tablet by mouth daily 2/9/21 2/9/21  Deirdre Jacobo MD   mirtazapine (REMERON) 30 MG tablet  11/3/20   Historical Provider, MD   OLANZapine (ZYPREXA) 10 MG tablet  11/3/20   Historical Provider, MD   Sennosides (SENNA) 8.6 MG CAPS 8.6 capsules 8/2/20   Historical Provider, MD   acetaminophen (TYLENOL) 500 MG tablet Take 1 tablet by mouth 3 times daily as needed for Pain  Patient not taking: Reported on 12/8/2020 10/20/20   Grecia Herman DO   apixaban (ELIQUIS) 5 MG TABS tablet Take 1 tablet by mouth 2 times daily 8/7/20   Wendy Mtz MD   diclofenac (VOLTAREN) 50 MG EC tablet Take 1 tablet by mouth 2 times daily 4/6/20   Daly Barrett DO amitriptyline (ELAVIL) 10 MG tablet Take 1 tablet by mouth nightly 1/31/20   Saba Velasquez,    atorvastatin (LIPITOR) 40 MG tablet Take 1 tablet by mouth nightly 1/31/20   Saba Velasquez,    clopidogrel (PLAVIX) 75 MG tablet Take 1 tablet by mouth daily 2/1/20   Annel Andino DO   albuterol sulfate HFA (PROVENTIL HFA) 108 (90 Base) MCG/ACT inhaler Inhale 1-2 puffs into the lungs once for 1 dose 1/9/20 12/8/20  Bernardino Hill MD   ondansetron (ZOFRAN ODT) 4 MG disintegrating tablet Take 1 tablet by mouth every 8 hours as needed for Nausea  Patient not taking: Reported on 12/8/2020 9/9/19   Christinia Collet, PA-C   Misc.  Devices [facility]) MISC 1 Device by Does not apply route once for 1 dose 8/5/19 8/5/19  Christinia Collet, PA-C   hydrOXYzine (ATARAX) 25 MG tablet Take 1 tablet by mouth every 8 hours as needed for Itching  Patient not taking: Reported on 12/8/2020 8/5/19   Christinia Collet, PA-C   benzonatate (TESSALON PERLES) 100 MG capsule Take 1 capsule by mouth 3 times daily as needed for Cough 3/11/19   Reagan Lopez DO   carvedilol (COREG) 6.25 MG tablet Take 1 tablet by mouth 2 times daily (with meals) 2/7/19   Paulo Aguilar MD   ferrous sulfate (MELISSA-HAO) 325 (65 Fe) MG tablet Take 1 tablet by mouth daily 2/7/19   Paulo Aguilar MD   melatonin 1 MG tablet Take 1 tablet by mouth nightly as needed for Sleep 1/11/19   Nelia Fairbanks MD   ipratropium-albuterol (DUONEB) 0.5-2.5 (3) MG/3ML SOLN nebulizer solution Inhale 3 mLs into the lungs every 4 hours as needed for Shortness of Breath  Patient not taking: Reported on 12/8/2020 1/11/19   Nelia Fairbanks MD   mometasone-formoterol [facility]) 100-5 MCG/ACT inhaler Inhale 2 puffs into the lungs 2 times daily 12/5/18   Khurram Dinh MD   pantoprazole (PROTONIX) 40 MG tablet Take 1 tablet by mouth 2 times daily (before meals)  Patient not taking: Reported on 12/8/2020 11/13/18   Jaky Pineda MD   folic acid (FOLVITE) 1 MG tablet Take 1 tablet by mouth daily 10/13/18   Rd Church MD   vitamin B-1 100 MG tablet Take 1 tablet by mouth daily 10/13/18   Rd Church MD   vitamin B-12 250 MCG tablet Take 1 tablet by mouth daily 10/13/18   Rd Church MD   Multiple Vitamins-Minerals (THERAPEUTIC MULTIVITAMIN-MINERALS) tablet Take 1 tablet by mouth daily 8/12/18   Roni Art MD       REVIEW OF SYSTEMS    (2-9 systems for level 4, 10 or more for level 5)    Review of Systems   Constitutional: Negative for chills, fatigue and fever. HENT: Negative for congestion and rhinorrhea. Respiratory: Negative for cough and shortness of breath. Cardiovascular: Negative for chest pain. Gastrointestinal: Negative for abdominal pain, nausea and vomiting. Musculoskeletal: Negative for myalgias. Left hip pain   Neurological: Positive for headaches. All other systems reviewed and are negative. PHYSICAL EXAM   (up to 7 for level 4, 8 or more for level 5)    INITIAL VITALS:   ED Triage Vitals [03/02/21 1414]   BP Temp Temp Source Pulse Resp SpO2 Height Weight   (!) 147/97 99.4 °F (37.4 °C) Oral 99 18 98 % 4' 11\" (1.499 m) --       Physical Exam  Vitals signs and nursing note reviewed. Constitutional:       General: She is not in acute distress. Appearance: She is well-developed. She is not ill-appearing. Eyes:      General: No visual field deficit. Cardiovascular:      Rate and Rhythm: Normal rate and regular rhythm. Pulses:           Radial pulses are 2+ on the right side and 2+ on the left side. Heart sounds: Normal heart sounds. No murmur. Pulmonary:      Effort: Pulmonary effort is normal. No respiratory distress. Breath sounds: Normal breath sounds. No decreased breath sounds. Abdominal:      General: Bowel sounds are normal. There is no distension. Palpations: Abdomen is soft. Tenderness: There is no abdominal tenderness. Musculoskeletal:      Left hip: She exhibits tenderness.  She exhibits normal range of motion, normal strength, no crepitus and no deformity. Right lower leg: No edema. Left lower leg: No edema. Skin:     General: Skin is warm and dry. Capillary Refill: Capillary refill takes less than 2 seconds. Neurological:      General: No focal deficit present. Mental Status: She is alert and oriented to person, place, and time. GCS: GCS eye subscore is 4. GCS verbal subscore is 5. GCS motor subscore is 6. Cranial Nerves: Cranial nerves are intact. No dysarthria or facial asymmetry. Sensory: Sensation is intact. No sensory deficit. Motor: Motor function is intact. Coordination: Finger-Nose-Finger Test and Heel to [employer] normal.         DIFFERENTIAL  DIAGNOSIS   PLAN (LABS / IMAGING / EKG):  Orders Placed This Encounter   Procedures    XR HIP 2-3 VW W PELVIS LEFT       MEDICATIONS ORDERED:  Orders Placed This Encounter   Medications    butalbital-acetaminophen-caffeine (FIORICET, ESGIC) per tablet 1 tablet    DISCONTD: butalbital-acetaminophen-caffeine (FIORICET, ESGIC) -40 MG per tablet     Sig: Take 1 tablet by mouth every 6 hours as needed for Headaches or Migraine     Dispense:  6 tablet     Refill:  0    butalbital-acetaminophen-caffeine (FIORICET, ESGIC) -40 MG per tablet     Sig: Take 1 tablet by mouth every 6 hours as needed for Headaches or Migraine     Dispense:  6 tablet     Refill:  0         DIAGNOSTIC RESULTS / EMERGENCYDEPARTMENT COURSE / MDM   LABS:  Labs Reviewed - No data to display    RADIOLOGY:  No results found. Impression:  Patient presenting with migraine-like symptoms. Does have a history of migraines, usually treated with Fioricet. Patient states that she ran out of [employer] several weeks ago and has not had a migraine since that time. Also complaining of left hip pain, atraumatic, chronic in nature. Does have a history of avascular necrosis to the left hip.   We will x-ray the left hip to make sure the patient has not developed a fracture will otherwise treat with Fioricet and reevaluate. EMERGENCY DEPARTMENT COURSE:   Patient received 1 Fioricet and stated that her migraine was gone. X-ray continues to demonstrate a vascular process but no acute abnormality and no fracture. Patient states that she feels \"wonderful\". Patient to follow-up with her orthopedist for further evaluation of her left hip pain. Patient will be given a few Fioricet for discharge. Patient otherwise safe for discharge. MDM  Number of Diagnoses or Management Options  Avascular necrosis of bone of left hip ([address]): new, needed workup  Other migraine without status migrainosus, not intractable: minor     Amount and/or Complexity of Data Reviewed  Tests in the radiology section of CPT®: ordered and reviewed  Review and summarize past medical records: yes  Discuss the patient with other providers: yes  Independent visualization of images, tracings, or specimens: yes    Risk of Complications, Morbidity, and/or Mortality  Presenting problems: low  Diagnostic procedures: low  Management options: low    Patient Progress  Patient progress: stable      PROCEDURES:  none    CONSULTS:  None    CRITICAL CARE:  Please see attending note    FINAL IMPRESSION     1. Other migraine without status migrainosus, not intractable    2. Avascular necrosis of bone of left hip ([address]          DISPOSITION / PLAN   DISPOSITION Decision To Discharge 03/02/2021 03:00:38 PM      Evaluation and treatment course in the ED, and plan of care upon discharge was discussed in length with the patient. Patient had no further questions prior to being discharged and was instructed to return to the ED for new or worsening symptoms. Any changes to existing medications or new prescriptions were reviewed with patient and they expressed understanding of how to correctly take their medications and the possible side effects.     PATIENT REFERRED TO:  Salud Hughes MD  [address] [place] [id number] [phone]    Call   Call to get your pre-op forms    [facility]  [address] [place] [id number]  [address] [place] [address]  [phone]        [facility]  [place], [place]  [phone]          DISCHARGE MEDICATIONS:  Discharge Medication List as of 3/2/2021  3:15 PM          Bryanna Stephens DO  Emergency Medicine Resident Physician, PGY-3    (Please note that portions of this note were completed with a voice recognition program.  Efforts were made to edit the dictations but occasionally words are mis-transcribed.)          Bryanna Stephens MD  03/03/21 3014

## 2021-03-06 ENCOUNTER — TELEPHONE (OUTPATIENT)
Dept: PAIN MANAGEMENT | Age: 66
End: 2021-03-06

## 2021-03-06 NOTE — TELEPHONE ENCOUNTER
Called patient ksenia to let her know that her procedure was cancelled for 3/8/21 and that we would call back with more information next week

## 2021-03-08 ENCOUNTER — TELEPHONE (OUTPATIENT)
Dept: PAIN MANAGEMENT | Age: 66
End: 2021-03-08

## 2021-03-08 NOTE — TELEPHONE ENCOUNTER
Pt states she does not wish to reschedule procedure at this time. Pt requested office visit with [address].  Pt scheduled appointment on 3/16

## 2021-03-13 ENCOUNTER — HOSPITAL ENCOUNTER (EMERGENCY)
Age: 66
Discharge: HOME | End: 2021-03-13
Attending: EMERGENCY MEDICINE
Payer: MEDICARE

## 2021-03-13 VITALS
RESPIRATION RATE: 16 BRPM | OXYGEN SATURATION: 99 % | SYSTOLIC BLOOD PRESSURE: 140 MMHG | WEIGHT: 85 LBS | TEMPERATURE: 97.1 F | DIASTOLIC BLOOD PRESSURE: 97 MMHG | BODY MASS INDEX: 17.17 KG/M2

## 2021-03-13 DIAGNOSIS — M70.62 TROCHANTERIC BURSITIS OF LEFT HIP: ICD-10-CM

## 2021-03-13 DIAGNOSIS — M25.552 LEFT HIP PAIN: Primary | ICD-10-CM

## 2021-03-13 PROCEDURE — 99282 EMERGENCY DEPT VISIT SF MDM: CPT

## 2021-03-13 PROCEDURE — 6370000000 HC RX 637 (ALT 250 FOR IP): Performed by: STUDENT IN AN ORGANIZED HEALTH CARE EDUCATION/TRAINING PROGRAM

## 2021-03-13 RX ORDER — ALBUTEROL SULFATE 90 UG/1
1-2 AEROSOL, METERED RESPIRATORY (INHALATION) ONCE
Qty: 1 INHALER | Refills: 0 | Status: ON HOLD | OUTPATIENT
Start: 2021-03-13 | End: 2021-04-06

## 2021-03-13 RX ORDER — ACETAMINOPHEN 500 MG
1000 TABLET ORAL ONCE
Status: COMPLETED | OUTPATIENT
Start: 2021-03-13 | End: 2021-03-13

## 2021-03-13 RX ORDER — IBUPROFEN 800 MG/1
800 TABLET ORAL EVERY 6 HOURS PRN
Qty: 21 TABLET | Refills: 0 | Status: SHIPPED | OUTPATIENT
Start: 2021-03-13 | End: 2021-03-28 | Stop reason: ALTCHOICE

## 2021-03-13 RX ORDER — CYCLOBENZAPRINE HCL 10 MG
10 TABLET ORAL ONCE
Status: COMPLETED | OUTPATIENT
Start: 2021-03-13 | End: 2021-03-13

## 2021-03-13 RX ORDER — ACETAMINOPHEN 500 MG
500 TABLET ORAL 3 TIMES DAILY PRN
Qty: 360 TABLET | Refills: 0 | Status: SHIPPED | OUTPATIENT
Start: 2021-03-13 | End: 2021-03-28

## 2021-03-13 RX ORDER — IBUPROFEN 800 MG/1
800 TABLET ORAL ONCE
Status: COMPLETED | OUTPATIENT
Start: 2021-03-13 | End: 2021-03-13

## 2021-03-13 RX ADMIN — IBUPROFEN 800 MG: 800 TABLET, FILM COATED ORAL at 10:03

## 2021-03-13 RX ADMIN — CYCLOBENZAPRINE 10 MG: 10 TABLET, FILM COATED ORAL at 10:03

## 2021-03-13 RX ADMIN — ACETAMINOPHEN 1000 MG: 500 TABLET ORAL at 10:02

## 2021-03-13 ASSESSMENT — PAIN DESCRIPTION - ORIENTATION: ORIENTATION: LEFT

## 2021-03-13 ASSESSMENT — PAIN DESCRIPTION - PAIN TYPE: TYPE: ACUTE PAIN

## 2021-03-13 ASSESSMENT — PAIN SCALES - GENERAL: PAINLEVEL_OUTOF10: 9

## 2021-03-13 NOTE — ED PROVIDER NOTES
[facility] ED  Emergency Department Encounter  EmergencyMedicine Resident     Pt Name:Emi Cheatham  MRN: [id number]  Birthdate 1955  Date of evaluation: 3/13/21  PCP:  No primary care provider on file. CHIEF COMPLAINT       Chief Complaint   Patient presents with    Medication Refill     needs inhaler    Hip Pain     chronic pain. has appointment for steroid shot on monday. HISTORY OF PRESENT ILLNESS  (Location/Symptom, Timing/Onset, Context/Setting, Quality, Duration, Modifying Factors, Severity.)      Emi Daniels is a 77 y.o. female who presents with left chronic hip pain. Patient sees pain management doctor, is due to go for a hip injection today however appointment was canceled, patient has a repeat appointment in 3 days. Patient primarily here for hip pain that is unchanged for the past year, as well as running out of her prescription for inhaler. No fevers or chills, no new falls or trauma or injuries, she is on Eliquis for prior stroke she took no meds prior to arrival, surgical history of right arthroplasty. PAST MEDICAL / SURGICAL / SOCIAL / FAMILY HISTORY      has a past medical history of Appendicitis, Cerebral artery occlusion with cerebral infarction ([address] CHF (congestive heart failure) (HCC), Chronic respiratory failure with hypoxia and hypercapnia (HCC), Chronic rhinitis, Cigarette smoker, COPD (chronic obstructive pulmonary disease) ([address] Depression, Dysphagia, Essential hypertension, GERD (gastroesophageal reflux disease), Heart failure, diastolic, with acute decompensation ([address] Hepatitis C, chronic ([address] History of smoking at least 1 pack per day for at least 30 years, Hyperlipidemia, Hypertension, Migraine, Moderate COPD (chronic obstructive pulmonary disease) ([address] Multiple transfusions, Neurogenic dysphagia, Osteoarthritis, Pneumonia, Scoliosis, and Substance abuse ([address]        has a past surgical history that includes  section; LEEP (); Upper gastrointestinal endoscopy (); Gastric bypass surgery; Appendectomy (2017); laparoscopic appendectomy (N/A, 3/27/2017); Upper gastrointestinal endoscopy (2018); Upper gastrointestinal endoscopy (N/A, 2018); pr colonoscopy w/biopsy single/multiple (N/A, 11/10/2018); pr egd transoral biopsy single/multiple (N/A, 2018); hip surgery (Right, 2019); and Total hip arthroplasty (Right, 2019).     Social History     Socioeconomic History    Marital status: Single     Spouse name: Not on file    Number of children: Not on file    Years of education: Not on file    Highest education level: Not on file   Occupational History    Not on file   Social Needs    Financial resource strain: Not on file    Food insecurity     Worry: Not on file     Inability: Not on file    Transportation needs     Medical: Not on file     Non-medical: Not on file   Tobacco Use    Smoking status: Former Smoker     Packs/day: 1.00     Years: 40.00     Pack years: 40.00     Types: Cigarettes     Start date: 5     Quit date: 2018     Years since quittin.3    Smokeless tobacco: Never Used   Substance and Sexual Activity    Alcohol use: No     Alcohol/week: 0.0 standard drinks    Drug use: No     Types: IV, Cocaine     Comment: past history    Sexual activity: Not on file   Lifestyle    Physical activity     Days per week: Not on file     Minutes per session: Not on file    Stress: Not on file   Relationships    Social connections     Talks on phone: Not on file     Gets together: Not on file     Attends Anabaptism service: Not on file     Active member of club or organization: Not on file     Attends meetings of clubs or organizations: Not on file     Relationship status: Not on file    Intimate partner violence     Fear of current or ex partner: Not on file     Emotionally abused: Not on file     Physically abused: Not on file     Forced sexual activity: Not on file   Other Topics Concern    Not on file   Social History Narrative    Not on file       Family History   Problem Relation Age of Onset    Breast Cancer Mother     Heart Disease Father        Allergies:  Aspirin and Keflet [cephalexin]    Home Medications:  Prior to Admission medications    Medication Sig Start Date End Date Taking? Authorizing Provider   albuterol sulfate HFA (PROVENTIL HFA) 108 (90 Base) MCG/ACT inhaler Inhale 1-2 puffs into the lungs once for 1 dose 3/13/21 3/13/21 Yes Alberto Swift DO   acetaminophen (TYLENOL) 500 MG tablet Take 1 tablet by mouth 3 times daily as needed for Pain 3/13/21  Yes Berenice Swift DO   ibuprofen (IBU) 800 MG tablet Take 1 tablet by mouth every 6 hours as needed for Pain 3/13/21  Yes Berenice Swift DO   butalbital-acetaminophen-caffeine (FIORICET, ESGIC) -91 MG per tablet Take 1 tablet by mouth every 6 hours as needed for Headaches or Migraine 3/2/21   Liu Dinero MD   gabapentin (NEURONTIN) 300 MG capsule Take 1 capsule by mouth 3 times daily for 30 days.  2/10/21 3/12/21  Lala Mon MD   Elastic Bandages & Supports (LUMBAR BACK BRACE/SUPPORT PAD) MISC 1 Units by Does not apply route daily 2/10/21 2/10/22  Lala Mon MD   lisinopril (PRINIVIL;ZESTRIL) 10 MG tablet Take 1 tablet by mouth daily 2/9/21   Lakia Liang MD   amLODIPine (NORVASC) 10 MG tablet Take 1 tablet by mouth daily 2/9/21 2/9/21  Lakia Liang MD   mirtazapine (REMERON) 30 MG tablet  11/3/20   Historical Provider, MD   OLANZapine (ZYPREXA) 10 MG tablet  11/3/20   Historical Provider, MD   Sennosides (SENNA) 8.6 MG CAPS 8.6 capsules 8/2/20   Historical Provider, MD   apixaban (ELIQUIS) 5 MG TABS tablet Take 1 tablet by mouth 2 times daily 8/7/20   Jesika Thibodeaux MD   diclofenac (VOLTAREN) 50 MG EC tablet Take 1 tablet by mouth 2 times daily 4/6/20   Brett Miller DO   amitriptyline (ELAVIL) 10 MG tablet Take 1 tablet by mouth nightly 1/31/20   Josephine Mealing, DO (THERAPEUTIC MULTIVITAMIN-MINERALS) tablet Take 1 tablet by mouth daily 8/12/18   Anna Reynoso MD       REVIEW OF SYSTEMS    (2-9 systems for level 4, 10 or more for level 5)      General ROS - No fevers, No chills, no gradual weight loss, no night sweats  Ophthalmic ROS - No discharge, No changes in vision  ENT ROS -  No sore throat, No rhinorrhea,   Respiratory ROS - no shortness of breath, no cough, no  wheezing  Cardiovascular ROS - No chest pain, no dyspnea on exertion  Gastrointestinal ROS - No abdominal pain, no nausea, no vomiting, no change in bowel habits, no black or bloody stools  Genito-Urinary ROS - No dysuria, trouble voiding, or hematuria  Musculoskeletal ROS - No myalgias, positive arthalgias  Neurological ROS - No headache, no dizziness/lightheadedness, No focal weakness, no loss of sensation  Dermatological ROS - No lesions, No rash         PHYSICAL EXAM   (up to 7 for level 4, 8 or more for level 5)      INITIAL VITALS:   BP (!) 140/97   Temp 97.1 °F (36.2 °C) (Tympanic)   Resp 16   Wt 85 lb (38.6 kg)   LMP 03/18/2015   SpO2 99%   BMI 17.17 kg/m²     General Appearance: Well-appearing, in no acute distress  HEENT: Head: normocephalic/atraumatic eyes: PERRLA, EOMT, conjunctiva not injected, sclerae nonicteric ears: External canals patent nose: Nares patent, no rhinorrhea, throat:mucous membranes moist, oropharynx clear     Neck: Trachea midline, no JVD. Lungs: No evidence of increased work of breathing. CTA B/L, no wheezes/rhonchi     Cardiovascular: RRR, no murmur, 2+ peripheral pulses bilaterally. Cap refill less than 2 seconds. No lower extremity edema noted    Abdomen: Soft, nontender. No guarding or rebound tenderness. Neurologic: CARBONE  to person, place, time, and event. No sensation deficits. Moving all extremities    Extremities: Skin warm, dry and intact.     Unilateral leg swelling, there is tenderness over the left greater trochanter likely bursitis versus chronic, arthritis, able to range hip joint and near full range of motion without any passive pain, no redness bruising or lacerations to overlying skin      DIFFERENTIAL  DIAGNOSIS     PLAN (LABS / IMAGING / EKG):  No orders of the defined types were placed in this encounter. MEDICATIONS ORDERED:  Orders Placed This Encounter   Medications    ibuprofen (ADVIL;MOTRIN) tablet 800 mg    cyclobenzaprine (FLEXERIL) tablet 10 mg    acetaminophen (TYLENOL) tablet 1,000 mg    albuterol sulfate HFA (PROVENTIL HFA) 108 (90 Base) MCG/ACT inhaler     Sig: Inhale 1-2 puffs into the lungs once for 1 dose     Dispense:  1 Inhaler     Refill:  0    acetaminophen (TYLENOL) 500 MG tablet     Sig: Take 1 tablet by mouth 3 times daily as needed for Pain     Dispense:  360 tablet     Refill:  0    ibuprofen (IBU) 800 MG tablet     Sig: Take 1 tablet by mouth every 6 hours as needed for Pain     Dispense:  21 tablet     Refill:  0           DIAGNOSTIC RESULTS / EMERGENCY DEPARTMENT COURSE / MDM     LABS:  No results found for this visit on 03/13/21. RADIOLOGY:  Xr Hip 2-3 Vw W Pelvis Left    Result Date: 3/2/2021  EXAMINATION: ONE XRAY VIEW OF THE PELVIS AND TWO XRAY VIEWS LEFT HIP 3/2/2021 2:36 pm COMPARISON: 10/20/2020 HISTORY: ORDERING SYSTEM PROVIDED HISTORY: left hip pain TECHNOLOGIST PROVIDED HISTORY: left hip pain Reason for Exam: left hip pain FINDINGS: AP view the pelvis and AP/frog-lateral views of the left hip are submitted for review. Status post right total hip arthroplasty without evidence for hardware complication. Findings compatible with avascular necrosis and collapse of the left femoral head with secondary osteoarthrosis. There is loss of joint space height and marginal osteophyte formation. Generalized osteopenia. Avascular necrosis with femoral head collapse. Secondary osteoarthrosis and marginal osteophyte formation of the left hip.        EKG  None    All EKG's are interpreted by the Emergency Department Physician who either signs or Co-signs this chart in the absence of a cardiologist.    EMERGENCY DEPARTMENT COURSE/IMPRESSION:    Patient chronic lytic left hip pain  Pain management injection appointment today, here for unchanged pain as well as need for inhaler, no signs based on history and exam for septic joint or trauma, no need for imaging or lab work, will give Tylenol, Motrin Flexeril, refill inhaler prescription follow-up with her appointment Tuesday         PROCEDURES:  None    CONSULTS:  None    CRITICAL CARE:  None    FINAL IMPRESSION      1. Left hip pain    2. Trochanteric bursitis of left hip          DISPOSITION / PLAN     DISPOSITION Decision To Discharge 03/13/2021 09:57:54 AM      PATIENT REFERRED TO:  [facility] ED  [address] [place] [address]  [phone]  Go to   If symptoms worsen      DISCHARGE MEDICATIONS:  Discharge Medication List as of 3/13/2021 10:00 AM      START taking these medications    Details   ibuprofen (IBU) 800 MG tablet Take 1 tablet by mouth every 6 hours as needed for Pain, Disp-21 tablet, R-0Print             DO London Smith D.O.   Emergency Medicine Resident    (Please note that portions of this note were completed with a voice recognition program.  Efforts were made to edit the dictations but occasionally words aremis-transcribed.)       Parth Graves DO  Resident  03/13/21 1400

## 2021-03-13 NOTE — ED PROVIDER NOTES
words are mis-transcribed.  Whenever words are used in this note in any gender, they shall be construed as though they were used in the gender appropriate to the circumstances; and whenever words are used in this note in the singular or plural form, they shall be construed as though they were used in the form appropriate to the circumstances.)    MD Robina Nuñez  Attending Emergency Medicine Physician             All Varela MD  03/13/21 3236

## 2021-03-13 NOTE — ED NOTES
Pt presented to ED for chronic left hip pain. Pt was scheduled for steroid injection on 3/8 but the doctor reschedule for Monday. Pt denies new injury to hip. Pt also requesting RX for inhaler. Pt denies chest pain or shortness of breath. Pt ambulated to room with cane.        Brigido Connor RN  03/13/21 9531

## 2021-03-16 ENCOUNTER — OFFICE VISIT (OUTPATIENT)
Dept: PAIN MANAGEMENT | Age: 66
End: 2021-03-16
Payer: MEDICARE

## 2021-03-16 VITALS
TEMPERATURE: 98.8 F | BODY MASS INDEX: 17.14 KG/M2 | WEIGHT: 85 LBS | DIASTOLIC BLOOD PRESSURE: 90 MMHG | OXYGEN SATURATION: 90 % | SYSTOLIC BLOOD PRESSURE: 154 MMHG | HEIGHT: 59 IN | HEART RATE: 86 BPM

## 2021-03-16 DIAGNOSIS — M51.36 DDD (DEGENERATIVE DISC DISEASE), LUMBAR: ICD-10-CM

## 2021-03-16 DIAGNOSIS — R69 SEVERE COMORBID ILLNESS: ICD-10-CM

## 2021-03-16 DIAGNOSIS — Z79.899 MEDICATION MANAGEMENT: ICD-10-CM

## 2021-03-16 DIAGNOSIS — M16.12 PRIMARY OSTEOARTHRITIS OF LEFT HIP: Primary | ICD-10-CM

## 2021-03-16 PROCEDURE — 3017F COLORECTAL CA SCREEN DOC REV: CPT | Performed by: ANESTHESIOLOGY

## 2021-03-16 PROCEDURE — G8419 CALC BMI OUT NRM PARAM NOF/U: HCPCS | Performed by: ANESTHESIOLOGY

## 2021-03-16 PROCEDURE — 4040F PNEUMOC VAC/ADMIN/RCVD: CPT | Performed by: ANESTHESIOLOGY

## 2021-03-16 PROCEDURE — G8400 PT W/DXA NO RESULTS DOC: HCPCS | Performed by: ANESTHESIOLOGY

## 2021-03-16 PROCEDURE — 1036F TOBACCO NON-USER: CPT | Performed by: ANESTHESIOLOGY

## 2021-03-16 PROCEDURE — 99214 OFFICE O/P EST MOD 30 MIN: CPT | Performed by: ANESTHESIOLOGY

## 2021-03-16 PROCEDURE — 1090F PRES/ABSN URINE INCON ASSESS: CPT | Performed by: ANESTHESIOLOGY

## 2021-03-16 PROCEDURE — 1123F ACP DISCUSS/DSCN MKR DOCD: CPT | Performed by: ANESTHESIOLOGY

## 2021-03-16 PROCEDURE — G8427 DOCREV CUR MEDS BY ELIG CLIN: HCPCS | Performed by: ANESTHESIOLOGY

## 2021-03-16 PROCEDURE — G8484 FLU IMMUNIZE NO ADMIN: HCPCS | Performed by: ANESTHESIOLOGY

## 2021-03-16 RX ORDER — TRAMADOL HYDROCHLORIDE 50 MG/1
50 TABLET ORAL 2 TIMES DAILY PRN
Qty: 60 TABLET | Refills: 1 | Status: SHIPPED | OUTPATIENT
Start: 2021-03-16 | End: 2021-04-15

## 2021-03-16 RX ORDER — GABAPENTIN 300 MG/1
300 CAPSULE ORAL 3 TIMES DAILY
Qty: 90 CAPSULE | Refills: 1 | Status: ON HOLD | OUTPATIENT
Start: 2021-03-16 | End: 2021-04-06 | Stop reason: SDUPTHER

## 2021-03-16 ASSESSMENT — ENCOUNTER SYMPTOMS
RESPIRATORY NEGATIVE: 1
BACK PAIN: 1

## 2021-03-16 NOTE — PROGRESS NOTES
The patient is a 77 y. o. Non-/non  female.          HPI     Chronic low back and hip pain  History of right total hip arthroplasty  Ongoing severe left hip pain  Pain is constant aggravated with standing and walking  Describes it as sharp pain  Diagnosed with severe left hip arthritis  Unfortunately her procedure was canceled  Currently taking tramadol and gabapentin  Find it helpful  Report no side effect from the medication  Denies any illicit substance use  Average pain score 8/10        Past Medical History:   Diagnosis Date    Appendicitis 3/27/2017    Cerebral artery occlusion with cerebral infarction ([address]     CHF (congestive heart failure) (HCC)     Chronic respiratory failure with hypoxia and hypercapnia (HCC)     Chronic rhinitis     Cigarette smoker     COPD (chronic obstructive pulmonary disease) ([address] 2018    Depression     Dysphagia     Essential hypertension 2018    GERD (gastroesophageal reflux disease)     Heart failure, diastolic, with acute decompensation ([address] 2018    Hepatitis C, chronic (HCC)     History of smoking at least 1 pack per day for at least 30 years     Hyperlipidemia     Hypertension     Migraine     Moderate COPD (chronic obstructive pulmonary disease) (HCC)     Multiple transfusions     Neurogenic dysphagia     Osteoarthritis     hands    Pneumonia     Scoliosis     Substance abuse ([address]     ivda and cocaine abuse in past      Past Surgical History:   Procedure Laterality Date    APPENDECTOMY  2017     SECTION      GASTRIC BYPASS SURGERY      HIP SURGERY Right 2019    HIP TOTAL ARTHROPLASTY ANTERIOR APPROACH - [facility],  C-ARM, (Right )    LAPAROSCOPIC APPENDECTOMY N/A 3/27/2017    APPENDECTOMY LAPAROSCOPIC performed by Anthony Stephenson MD at [address] [place] [address]      hgsil    [place] COLONOSCOPY W/BIOPSY SINGLE/MULTIPLE N/A 11/10/2018    COLONOSCOPY WITH BIOPSY [facility]lo Loomis [facility]  [place] EGD TRANSORAL BIOPSY SINGLE/MULTIPLE N/A 2018    EGD ESOPHAGOGASTRODUODENOSCOPY performed by Carolyn Chisholm MD at [address] Right 2019    HIP TOTAL ARTHROPLASTY ANTERIOR APPROACH - [facility],  C-ARM, performed by Cristina Villaseñor DO at [address]      oesophagitis, candidiasis    UPPER GASTROINTESTINAL ENDOSCOPY  2018    UPPER GASTROINTESTINAL ENDOSCOPY N/A 2018    EGD CONTROL HEMORRHAGE performed by Carolyn Chisholm MD at [address] History     Socioeconomic History    Marital status: Single     Spouse name: None    Number of children: None    Years of education: None    Highest education level: None   Occupational History    None   Social Needs    Financial resource strain: None    Food insecurity     Worry: None     Inability: None    Transportation needs     Medical: None     Non-medical: None   Tobacco Use    Smoking status: Former Smoker     Packs/day: 1.00     Years: 40.00     Pack years: 40.00     Types: Cigarettes     Start date:      Quit date: 2018     Years since quittin.3    Smokeless tobacco: Never Used   Substance and Sexual Activity    Alcohol use: No     Alcohol/week: 0.0 standard drinks    Drug use: No     Types: IV, Cocaine     Comment: past history    Sexual activity: None   Lifestyle    Physical activity     Days per week: None     Minutes per session: None    Stress: None   Relationships    Social connections     Talks on phone: None     Gets together: None     Attends Anabaptism service: None     Active member of club or organization: None     Attends meetings of clubs or organizations: None     Relationship status: None    Intimate partner violence     Fear of current or ex partner: None     Emotionally abused: None     Physically abused: None     Forced sexual activity: None   Other Topics Concern    None   Social History Narrative    None     Family History Problem Relation Age of Onset    Breast Cancer Mother     Heart Disease Father      Allergies   Allergen Reactions    Aspirin Itching    Keflet [Cephalexin]      Tolerated penicillin V 12/13, tolerates cephalexin 6/14     Aspirin and Keflet [cephalexin]   Vitals:    03/16/21 1500   BP: (!) 154/90   Pulse: 86   Temp: 98.8 °F (37.1 °C)   SpO2: 90%     Current Outpatient Medications   Medication Sig Dispense Refill    gabapentin (NEURONTIN) 300 MG capsule Take 1 capsule by mouth 3 times daily for 30 days. 90 capsule 1    traMADol (ULTRAM) 50 MG tablet Take 1 tablet by mouth 2 times daily as needed for Pain for up to 30 days.  60 tablet 1    ibuprofen (IBU) 800 MG tablet Take 1 tablet by mouth every 6 hours as needed for Pain 21 tablet 0    butalbital-acetaminophen-caffeine (FIORICET, ESGIC) -40 MG per tablet Take 1 tablet by mouth every 6 hours as needed for Headaches or Migraine 6 tablet 0    Elastic Bandages & Supports (LUMBAR BACK BRACE/SUPPORT PAD) MISC 1 Units by Does not apply route daily 1 each 0    lisinopril (PRINIVIL;ZESTRIL) 10 MG tablet Take 1 tablet by mouth daily 30 tablet 0    mirtazapine (REMERON) 30 MG tablet       OLANZapine (ZYPREXA) 10 MG tablet       Sennosides (SENNA) 8.6 MG CAPS 8.6 capsules      apixaban (ELIQUIS) 5 MG TABS tablet Take 1 tablet by mouth 2 times daily 60 tablet 3    diclofenac (VOLTAREN) 50 MG EC tablet Take 1 tablet by mouth 2 times daily 60 tablet 3    amitriptyline (ELAVIL) 10 MG tablet Take 1 tablet by mouth nightly 30 tablet 3    atorvastatin (LIPITOR) 40 MG tablet Take 1 tablet by mouth nightly 30 tablet 3    clopidogrel (PLAVIX) 75 MG tablet Take 1 tablet by mouth daily 30 tablet 3    benzonatate (TESSALON PERLES) 100 MG capsule Take 1 capsule by mouth 3 times daily as needed for Cough 10 capsule 0    carvedilol (COREG) 6.25 MG tablet Take 1 tablet by mouth 2 times daily (with meals) 60 tablet 3    ferrous sulfate (MELISSA-HAO) 325 (65 Fe) MG tablet Take 1 tablet by mouth daily 30 tablet 0    melatonin 1 MG tablet Take 1 tablet by mouth nightly as needed for Sleep 20 tablet 0    ipratropium-albuterol (DUONEB) 0.5-2.5 (3) MG/3ML SOLN nebulizer solution Inhale 3 mLs into the lungs every 4 hours as needed for Shortness of Breath 360 mL 5    mometasone-formoterol (DULERA) 100-5 MCG/ACT inhaler Inhale 2 puffs into the lungs 2 times daily 1 Inhaler 5    folic acid (FOLVITE) 1 MG tablet Take 1 tablet by mouth daily 30 tablet 3    vitamin B-1 100 MG tablet Take 1 tablet by mouth daily 30 tablet 3    vitamin B-12 250 MCG tablet Take 1 tablet by mouth daily 30 tablet 3    Multiple Vitamins-Minerals (THERAPEUTIC MULTIVITAMIN-MINERALS) tablet Take 1 tablet by mouth daily 30 tablet 3    albuterol sulfate HFA (PROVENTIL HFA) 108 (90 Base) MCG/ACT inhaler Inhale 1-2 puffs into the lungs once for 1 dose 1 Inhaler 0    acetaminophen (TYLENOL) 500 MG tablet Take 1 tablet by mouth 3 times daily as needed for Pain (Patient not taking: Reported on 3/16/2021) 360 tablet 0    ondansetron (ZOFRAN ODT) 4 MG disintegrating tablet Take 1 tablet by mouth every 8 hours as needed for Nausea (Patient not taking: Reported on 12/8/2020) 6 tablet 0    Misc. Devices [facility]) MISC 1 Device by Does not apply route once for 1 dose 1 each 0    hydrOXYzine (ATARAX) 25 MG tablet Take 1 tablet by mouth every 8 hours as needed for Itching (Patient not taking: Reported on 12/8/2020) 12 tablet 0    pantoprazole (PROTONIX) 40 MG tablet Take 1 tablet by mouth 2 times daily (before meals) (Patient not taking: Reported on 12/8/2020) 30 tablet 3     No current facility-administered medications for this visit. Review of Systems   Constitutional: Positive for appetite change. Negative for fever. Eyes: Positive for visual disturbance. Respiratory: Negative. Musculoskeletal: Positive for arthralgias and back pain. Neurological: Positive for dizziness and headaches. Psychiatric/Behavioral: Positive for confusion. Objective:  General Appearance:  Uncomfortable and in pain. Vital signs: (most recent): Blood pressure (!) 154/90, pulse 86, temperature 98.8 °F (37.1 °C), height 4' 11\" (1.499 m), weight 85 lb (38.6 kg), last menstrual period 03/18/2015, SpO2 90 %. Vital signs are normal.  No fever. Output: Producing urine and producing stool. HEENT: Normal HEENT exam.    Lungs:  Normal effort and normal respiratory rate. Breath sounds clear to auscultation. She is not in respiratory distress. Heart: Normal rate. Extremities: Normal range of motion. There is no deformity. Neurological: Patient is alert and oriented to person, place and time. Patient has normal coordination. Pupils:  Pupils are equal, round, and reactive to light. Pupils are equal.   Skin:  Warm and dry. No rash or cyanosis. Assessment & Plan     1. Primary osteoarthritis of left hip    2. Severe comorbid illness    3. DDD (degenerative disc disease), lumbar    4. Medication management        Orders Placed This Encounter   Procedures    OR ARTHROCENTESIS ASPIR&/INJ MAJOR JT/BURSA W/O US      Orders Placed This Encounter   Medications    gabapentin (NEURONTIN) 300 MG capsule     Sig: Take 1 capsule by mouth 3 times daily for 30 days. Dispense:  90 capsule     Refill:  1    traMADol (ULTRAM) 50 MG tablet     Sig: Take 1 tablet by mouth 2 times daily as needed for Pain for up to 30 days. Dispense:  60 tablet     Refill:  1     Reduce doses taken as pain becomes manageable        Controlled Substance Monitoring:    Acute and Chronic Pain Monitoring:   RX Monitoring 3/16/2021   Attestation -   Periodic Controlled Substance Monitoring Possible medication side effects, risk of tolerance/dependence & alternative treatments discussed. ;No signs of potential drug abuse or diversion identified. ;Assessed functional status. ;Obtaining appropriate analgesic effect of treatment.

## 2021-03-18 ENCOUNTER — HOSPITAL ENCOUNTER (EMERGENCY)
Age: 66
Discharge: HOME | End: 2021-03-18
Attending: EMERGENCY MEDICINE
Payer: MEDICARE

## 2021-03-18 VITALS
TEMPERATURE: 96.6 F | BODY MASS INDEX: 17.14 KG/M2 | HEART RATE: 82 BPM | WEIGHT: 85 LBS | DIASTOLIC BLOOD PRESSURE: 82 MMHG | HEIGHT: 59 IN | RESPIRATION RATE: 16 BRPM | SYSTOLIC BLOOD PRESSURE: 149 MMHG | OXYGEN SATURATION: 100 %

## 2021-03-18 DIAGNOSIS — G43.909 MIGRAINE WITHOUT STATUS MIGRAINOSUS, NOT INTRACTABLE, UNSPECIFIED MIGRAINE TYPE: Primary | ICD-10-CM

## 2021-03-18 PROCEDURE — 99285 EMERGENCY DEPT VISIT HI MDM: CPT

## 2021-03-18 PROCEDURE — 6370000000 HC RX 637 (ALT 250 FOR IP): Performed by: STUDENT IN AN ORGANIZED HEALTH CARE EDUCATION/TRAINING PROGRAM

## 2021-03-18 RX ORDER — IBUPROFEN 400 MG/1
600 TABLET ORAL ONCE
Status: COMPLETED | OUTPATIENT
Start: 2021-03-18 | End: 2021-03-18

## 2021-03-18 RX ORDER — ONDANSETRON 4 MG/1
4 TABLET, ORALLY DISINTEGRATING ORAL ONCE
Status: COMPLETED | OUTPATIENT
Start: 2021-03-18 | End: 2021-03-18

## 2021-03-18 RX ORDER — BUTALBITAL, ACETAMINOPHEN AND CAFFEINE 50; 325; 40 MG/1; MG/1; MG/1
1 TABLET ORAL ONCE
Status: COMPLETED | OUTPATIENT
Start: 2021-03-18 | End: 2021-03-18

## 2021-03-18 RX ORDER — BUTALBITAL, ACETAMINOPHEN AND CAFFEINE 50; 325; 40 MG/1; MG/1; MG/1
1 TABLET ORAL EVERY 6 HOURS PRN
Qty: 10 TABLET | Refills: 0 | Status: SHIPPED | OUTPATIENT
Start: 2021-03-18 | End: 2021-03-28 | Stop reason: SDUPTHER

## 2021-03-18 RX ADMIN — IBUPROFEN 600 MG: 400 TABLET, FILM COATED ORAL at 11:36

## 2021-03-18 RX ADMIN — BUTALBITAL, ACETAMINOPHEN AND CAFFEINE 1 TABLET: 50; 325; 40 TABLET ORAL at 11:37

## 2021-03-18 RX ADMIN — ONDANSETRON 4 MG: 4 TABLET, ORALLY DISINTEGRATING ORAL at 11:37

## 2021-03-18 ASSESSMENT — PAIN DESCRIPTION - DESCRIPTORS: DESCRIPTORS: CONSTANT

## 2021-03-18 NOTE — ED PROVIDER NOTES
apixaban (ELIQUIS) 5 MG TABS tablet Take 1 tablet by mouth 2 times daily 8/7/20   Hazel Sainz MD   diclofenac (VOLTAREN) 50 MG EC tablet Take 1 tablet by mouth 2 times daily 4/6/20   Livier Zaragoza,    amitriptyline (ELAVIL) 10 MG tablet Take 1 tablet by mouth nightly 1/31/20   Annel Andino, DO   atorvastatin (LIPITOR) 40 MG tablet Take 1 tablet by mouth nightly 1/31/20   Casey Zavala DO   clopidogrel (PLAVIX) 75 MG tablet Take 1 tablet by mouth daily 2/1/20   Annel Andino,    ondansetron (ZOFRAN ODT) 4 MG disintegrating tablet Take 1 tablet by mouth every 8 hours as needed for Nausea  Patient not taking: Reported on 12/8/2020 9/9/19   Tasia Luis PA-C   Misc.  Devices [facility]) MISC 1 Device by Does not apply route once for 1 dose 8/5/19 8/5/19  Tasia Luis PA-C   hydrOXYzine (ATARAX) 25 MG tablet Take 1 tablet by mouth every 8 hours as needed for Itching  Patient not taking: Reported on 12/8/2020 8/5/19   Tasia Luis PA-C   benzonatate (TESSALON PERLES) 100 MG capsule Take 1 capsule by mouth 3 times daily as needed for Cough 3/11/19   Cassy Lopez DO   carvedilol (COREG) 6.25 MG tablet Take 1 tablet by mouth 2 times daily (with meals) 2/7/19   Sal Roche MD   ferrous sulfate (MELISSA-HAO) 325 (65 Fe) MG tablet Take 1 tablet by mouth daily 2/7/19   Sal Roche MD   melatonin 1 MG tablet Take 1 tablet by mouth nightly as needed for Sleep 1/11/19   Milena Davidson MD   ipratropium-albuterol (DUONEB) 0.5-2.5 (3) MG/3ML SOLN nebulizer solution Inhale 3 mLs into the lungs every 4 hours as needed for Shortness of Breath 1/11/19   Milena Davidson MD   mometasone-formoterol [facility]) 100-5 MCG/ACT inhaler Inhale 2 puffs into the lungs 2 times daily 12/5/18   Val Bell MD   pantoprazole (PROTONIX) 40 MG tablet Take 1 tablet by mouth 2 times daily (before meals)  Patient not taking: Reported on 12/8/2020 11/13/18   Ermelinda Rodriguez MD   folic acid (FOLVITE) 1 MG tablet Cardiovascular:      Rate and Rhythm: Normal rate and regular rhythm. Pulses: Normal pulses. Heart sounds: Normal heart sounds. No murmur. Pulmonary:      Effort: Pulmonary effort is normal.      Breath sounds: Normal breath sounds. No wheezing. Abdominal:      General: Abdomen is flat. Bowel sounds are normal.      Tenderness: There is no abdominal tenderness. Musculoskeletal:     Normal range of motion. General: No swelling or tenderness. No LE edema    Skin:     General: Skin is warm. Capillary Refill: Capillary refill takes less than 2 seconds. Coloration: Skin is not jaundiced. Neurological:      General: No focal deficit present. Mental Status: Alert and oriented to person, place, and time. Mental status is at baseline. Motor: No weakness. Baseline gait, ambulatory with cane. DIFFERENTIAL  DIAGNOSIS     PLAN (LABS / IMAGING / EKG):  No orders of the defined types were placed in this encounter. MEDICATIONS ORDERED:  Orders Placed This Encounter   Medications    ibuprofen (ADVIL;MOTRIN) tablet 600 mg    butalbital-acetaminophen-caffeine (FIORICET, ESGIC) per tablet 1 tablet    ondansetron (ZOFRAN-ODT) disintegrating tablet 4 mg    butalbital-acetaminophen-caffeine (FIORICET, ESGIC) -40 MG per tablet     Sig: Take 1 tablet by mouth every 6 hours as needed for Headaches or Migraine     Dispense:  10 tablet     Refill:  0           DIAGNOSTIC RESULTS / EMERGENCY DEPARTMENT COURSE / MDM     LABS:  No results found for this visit on 03/18/21. RADIOLOGY:  None    EKG  None    All EKG's are interpreted by the Emergency Department Physician who either signs or Co-signs this chart in the absence of a cardiologist.    EMERGENCY DEPARTMENT COURSE:  Patient breathing quietly and unlabored on room air. Speech is normal and speaking in full sentences without requiring to pause to take a breath.   Physical exam unremarkable cranial nerves II through XII grossly intact. Patient ambulatory with baseline gait, uses cane. Vital signs stable, afebrile. Patient requesting talk to  due to her homelessness. Says she has been using magnifying glasses as her primary eyeglasses, has not seen her optometrist in more than 1 year. Patient not having any emesis with emergency department. Physical exam unremarkable. Patient says this is typical to her migraine pattern associated with photophobia and phonophobia. Will treat with Fioricet, ibuprofen. Will have  speak to the patient and reevaluate. Patient says she feels much better, she is requesting discharge. We will discharge her with renewal for Fioricet prescription, she has paperwork from  for optometry and housing assistance. PROCEDURES:  None    CONSULTS:  None    CRITICAL CARE:  None    FINAL IMPRESSION      1.  Migraine without status migrainosus, not intractable, unspecified migraine type          DISPOSITION / PLAN     DISPOSITION Decision To Discharge 03/18/2021 01:17:10 PM      PATIENT REFERRED TO:  [address] [place], [place] [id number] [phone]  Schedule an appointment as soon as possible for a visit in 2 days        DISCHARGE MEDICATIONS:  Current Discharge Medication List          Natalia Lorenzana MD  Emergency Medicine Resident    (Please note that portions of thisnote were completed with a voice recognition program.  Efforts were made to edit the dictations but occasionally words are mis-transcribed.)       Natalia Lorenzana MD  Resident  03/18/21 [id number]

## 2021-03-18 NOTE — ED PROVIDER NOTES
[address] ED     Emergency Department     Faculty Attestation        I performed a history and physical examination of the patient and discussed management with the resident. I reviewed the residents note and agree with the documented findings and plan of care. Any areas of disagreement are noted on the chart. I was personally present for the key portions of any procedures. I have documented in the chart those procedures where I was not present during the key portions. I have reviewed the emergency nurses triage note. I agree with the chief complaint, past medical history, past surgical history, allergies, medications, social and family history as documented unless otherwise noted below. For Physician Assistant/ Nurse Practitioner cases/documentation I have personally evaluated this patient and have completed at least one if not all key elements of the E/M (history, physical exam, and MDM). Additional findings are as noted. Vital Signs: BP: (!) 149/82  Pulse: 82  Resp: 16  Temp: 96.6 °F (35.9 °C) SpO2: 100 %  PCP:  No primary care provider on file. Pertinent Comments:         Critical Care  None    This patient was evaluated in the Emergency Department for symptoms described in the history of present illness. He/she was evaluated in the context of the global COVID-19 pandemic, which necessitated consideration that the patient might be at risk for infection with the SARS-CoV-2 virus that causes COVID-19. Institutional protocols and algorithms that pertain to the evaluation of patients at risk for COVID-19 are in a state of rapid change based on information released by regulatory bodies including the CDC and federal and state organizations. These policies and algorithms were followed during the patient's care in the ED.     (Please note that portions of this note were completed with a voice recognition program. Efforts were made to edit the dictations but occasionally words are mis-transcribed.  Whenever words are used in this note in any gender, they shall be construed as though they were used in the gender appropriate to the circumstances; and whenever words are used in this note in the singular or plural form, they shall be construed as though they were used in the form appropriate to the circumstances.)    MD Radha Rock  Attending Emergency Medicine Physician           Osmel Perea MD  03/18/21 [id number]

## 2021-03-18 NOTE — ED NOTES
Writer met with patient at bedside regarding need for eye doctor and dentist.  Leobardo Madrid provided patient with a list of eye doctors and gave patient the number to the dental center. She reports working with her , Mary Huber, from the [facility] on housing and has placed some applications. Patient will need transportation back to the [facility] at discharge to catch her ride back to the shelter after dinner.        Ne Ramirez, [address]  03/18/21 4701

## 2021-03-22 ENCOUNTER — HOSPITAL ENCOUNTER (EMERGENCY)
Age: 66
Discharge: HOME | End: 2021-03-22
Attending: EMERGENCY MEDICINE
Payer: MEDICARE

## 2021-03-22 VITALS
BODY MASS INDEX: 17.17 KG/M2 | RESPIRATION RATE: 16 BRPM | SYSTOLIC BLOOD PRESSURE: 152 MMHG | TEMPERATURE: 97.7 F | WEIGHT: 85 LBS | DIASTOLIC BLOOD PRESSURE: 90 MMHG | HEART RATE: 88 BPM | OXYGEN SATURATION: 98 %

## 2021-03-22 DIAGNOSIS — M25.552 LEFT HIP PAIN: Primary | ICD-10-CM

## 2021-03-22 PROCEDURE — 6360000002 HC RX W HCPCS: Performed by: STUDENT IN AN ORGANIZED HEALTH CARE EDUCATION/TRAINING PROGRAM

## 2021-03-22 PROCEDURE — 96372 THER/PROPH/DIAG INJ SC/IM: CPT

## 2021-03-22 PROCEDURE — 6370000000 HC RX 637 (ALT 250 FOR IP): Performed by: STUDENT IN AN ORGANIZED HEALTH CARE EDUCATION/TRAINING PROGRAM

## 2021-03-22 PROCEDURE — 99282 EMERGENCY DEPT VISIT SF MDM: CPT

## 2021-03-22 RX ORDER — KETOROLAC TROMETHAMINE 30 MG/ML
30 INJECTION, SOLUTION INTRAMUSCULAR; INTRAVENOUS ONCE
Status: COMPLETED | OUTPATIENT
Start: 2021-03-22 | End: 2021-03-22

## 2021-03-22 RX ORDER — LIDOCAINE 4 G/G
1 PATCH TOPICAL ONCE
Status: DISCONTINUED | OUTPATIENT
Start: 2021-03-22 | End: 2021-03-22 | Stop reason: HOSPADM

## 2021-03-22 RX ORDER — ORPHENADRINE CITRATE 30 MG/ML
60 INJECTION INTRAMUSCULAR; INTRAVENOUS ONCE
Status: COMPLETED | OUTPATIENT
Start: 2021-03-22 | End: 2021-03-22

## 2021-03-22 RX ADMIN — ORPHENADRINE CITRATE 60 MG: 60 INJECTION INTRAMUSCULAR; INTRAVENOUS at 10:46

## 2021-03-22 RX ADMIN — KETOROLAC TROMETHAMINE 30 MG: 30 INJECTION, SOLUTION INTRAMUSCULAR at 10:46

## 2021-03-22 ASSESSMENT — ENCOUNTER SYMPTOMS
EYE ITCHING: 0
COUGH: 0
ABDOMINAL PAIN: 0
NAUSEA: 0
DIARRHEA: 0
EYE REDNESS: 0
SORE THROAT: 0
SHORTNESS OF BREATH: 0
RHINORRHEA: 0
VOMITING: 0

## 2021-03-22 ASSESSMENT — PAIN DESCRIPTION - PAIN TYPE: TYPE: CHRONIC PAIN

## 2021-03-22 ASSESSMENT — PAIN SCALES - GENERAL: PAINLEVEL_OUTOF10: 8

## 2021-03-22 NOTE — ED PROVIDER NOTES
[facility] ED  Emergency Department Encounter  Emergency Medicine Resident     Pt Name: Mitchel Muller  MRN: [id number]  [place] 1955  Date ofevaluation: 3/22/21  PCP:  No primary care provider on file. CHIEF COMPLAINT       Chief Complaint   Patient presents with    Hip Pain     HISTORY OF PRESENT ILLNESS  (Location/Symptom, Timing/Onset, Context/Setting, Quality, Duration, Modifying Factors, Severity, Associated signs/symptoms)     Emi Daniels is a 77 y.o. female who presents acute on chronic left hip pain. Patient reports that for last several years she has had pain in her left hip that is gradually getting worse. She is follow-up with orthopedic surgeon who told her that it would be a high risk for surgery for her to have her hip replaced. She has not seen her pain management physician as been taking the tramadol that she has been prescribed but she still rates her pain 9/10. No new trauma. No new weakness numbness or tingling. No pain elsewhere. Smokes occasionally, denies any drug or alcohol use. PAST MEDICAL / SURGICAL / SOCIAL / FAMILY HISTORY      has a past medical history of Appendicitis, Cerebral artery occlusion with cerebral infarction ([address] CHF (congestive heart failure) (HCC), Chronic respiratory failure with hypoxia and hypercapnia (HCC), Chronic rhinitis, Cigarette smoker, COPD (chronic obstructive pulmonary disease) ([address] Depression, Dysphagia, Essential hypertension, GERD (gastroesophageal reflux disease), Heart failure, diastolic, with acute decompensation ([address] Hepatitis C, chronic ([address] History of smoking at least 1 pack per day for at least 30 years, Hyperlipidemia, Hypertension, Migraine, Moderate COPD (chronic obstructive pulmonary disease) ([address] Multiple transfusions, Neurogenic dysphagia, Osteoarthritis, Pneumonia, Scoliosis, and Substance abuse ([address] has a past surgical history that includes  section; MORIAH (2002);  Upper gastrointestinal endoscopy (); Gastric bypass surgery; Appendectomy (2017); laparoscopic appendectomy (N/A, 3/27/2017); Upper gastrointestinal endoscopy (2018); Upper gastrointestinal endoscopy (N/A, 2018); pr colonoscopy w/biopsy single/multiple (N/A, 11/10/2018); pr egd transoral biopsy single/multiple (N/A, 2018); hip surgery (Right, 2019); and Total hip arthroplasty (Right, 2019).     Social History     Socioeconomic History    Marital status: Single     Spouse name: Not on file    Number of children: Not on file    Years of education: Not on file    Highest education level: Not on file   Occupational History    Not on file   Social Needs    Financial resource strain: Not on file    Food insecurity     Worry: Not on file     Inability: Not on file    Transportation needs     Medical: Not on file     Non-medical: Not on file   Tobacco Use    Smoking status: Former Smoker     Packs/day: 1.00     Years: 40.00     Pack years: 40.00     Types: Cigarettes     Start date: 5     Quit date: 2018     Years since quittin.3    Smokeless tobacco: Never Used   Substance and Sexual Activity    Alcohol use: No     Alcohol/week: 0.0 standard drinks    Drug use: No     Types: IV, Cocaine     Comment: past history    Sexual activity: Not on file   Lifestyle    Physical activity     Days per week: Not on file     Minutes per session: Not on file    Stress: Not on file   Relationships    Social connections     Talks on phone: Not on file     Gets together: Not on file     Attends Buddhist service: Not on file     Active member of club or organization: Not on file     Attends meetings of clubs or organizations: Not on file     Relationship status: Not on file    Intimate partner violence     Fear of current or ex partner: Not on file     Emotionally abused: Not on file     Physically abused: Not on file     Forced sexual activity: Not on file   Other Topics Concern    Not on file   Social History Narrative    Not on file       Family History   Problem Relation Age of Onset    Breast Cancer Mother     Heart Disease Father        Allergies:  Aspirin and Keflet [cephalexin]    Home Medications:  Prior to Admission medications    Medication Sig Start Date End Date Taking? Authorizing Provider   butalbital-acetaminophen-caffeine (FIORICET, ESGIC) -48 MG per tablet Take 1 tablet by mouth every 6 hours as needed for Headaches or Migraine 3/18/21   Oniel Lee MD   gabapentin (NEURONTIN) 300 MG capsule Take 1 capsule by mouth 3 times daily for 30 days. 3/16/21 4/15/21  Martin Cooper MD   traMADol (ULTRAM) 50 MG tablet Take 1 tablet by mouth 2 times daily as needed for Pain for up to 30 days.  3/16/21 4/15/21  Martin Cooper MD   albuterol sulfate HFA (PROVENTIL HFA) 108 (90 Base) MCG/ACT inhaler Inhale 1-2 puffs into the lungs once for 1 dose 3/13/21 3/13/21  Navi Horne DO   acetaminophen (TYLENOL) 500 MG tablet Take 1 tablet by mouth 3 times daily as needed for Pain  Patient not taking: Reported on 3/16/2021 3/13/21   Marcia Swift DO   ibuprofen (IBU) 800 MG tablet Take 1 tablet by mouth every 6 hours as needed for Pain 3/13/21   Navi Horne DO   Elastic Bandages & Supports (LUMBAR BACK BRACE/SUPPORT PAD) MISC 1 Units by Does not apply route daily 2/10/21 2/10/22  Martin Cooper MD   lisinopril (PRINIVIL;ZESTRIL) 10 MG tablet Take 1 tablet by mouth daily 2/9/21   Chantell Knight MD   amLODIPine (NORVASC) 10 MG tablet Take 1 tablet by mouth daily 2/9/21 2/9/21  Chantell Knight MD   mirtazapine (REMERON) 30 MG tablet  11/3/20   Historical Provider, MD   OLANZapine (ZYPREXA) 10 MG tablet  11/3/20   Historical Provider, MD   Sennosides (SENNA) 8.6 MG CAPS 8.6 capsules 8/2/20   Historical Provider, MD   apixaban (ELIQUIS) 5 MG TABS tablet Take 1 tablet by mouth 2 times daily 8/7/20   Minna Londono MD   diclofenac (VOLTAREN) 50 MG EC tablet Take 1 tablet by mouth 2 times daily 4/6/20   No Diaz, DO   amitriptyline (ELAVIL) 10 MG tablet Take 1 tablet by mouth nightly 1/31/20   Annel Andino, DO   atorvastatin (LIPITOR) 40 MG tablet Take 1 tablet by mouth nightly 1/31/20   Vickey Bowels, DO   clopidogrel (PLAVIX) 75 MG tablet Take 1 tablet by mouth daily 2/1/20   Annel Andino, DO   ondansetron (ZOFRAN ODT) 4 MG disintegrating tablet Take 1 tablet by mouth every 8 hours as needed for Nausea  Patient not taking: Reported on 12/8/2020 9/9/19   Arcelia Valdez PA-C   Misc.  Devices [facility]) MISC 1 Device by Does not apply route once for 1 dose 8/5/19 8/5/19  Arcelia Valdez PA-C   hydrOXYzine (ATARAX) 25 MG tablet Take 1 tablet by mouth every 8 hours as needed for Itching  Patient not taking: Reported on 12/8/2020 8/5/19   Arcelia Valdez PA-C   benzonatate (TESSALON PERLES) 100 MG capsule Take 1 capsule by mouth 3 times daily as needed for Cough 3/11/19   Alicia Lopez,    carvedilol (COREG) 6.25 MG tablet Take 1 tablet by mouth 2 times daily (with meals) 2/7/19   Dora Miller MD   ferrous sulfate (MELISSA-HAO) 325 (65 Fe) MG tablet Take 1 tablet by mouth daily 2/7/19   Dora Miller MD   melatonin 1 MG tablet Take 1 tablet by mouth nightly as needed for Sleep 1/11/19   Miguel Bowers MD   ipratropium-albuterol (DUONEB) 0.5-2.5 (3) MG/3ML SOLN nebulizer solution Inhale 3 mLs into the lungs every 4 hours as needed for Shortness of Breath 1/11/19   Miguel Bowers MD   mometasone-formoterol [facility]) 100-5 MCG/ACT inhaler Inhale 2 puffs into the lungs 2 times daily 12/5/18   Yesi Mixon MD   pantoprazole (PROTONIX) 40 MG tablet Take 1 tablet by mouth 2 times daily (before meals)  Patient not taking: Reported on 12/8/2020 11/13/18   Saleem Jackson MD   folic acid (FOLVITE) 1 MG tablet Take 1 tablet by mouth daily 10/13/18   Juan Stock MD   vitamin B-1 100 MG tablet Take 1 tablet by mouth daily 10/13/18   Juan Stock MD   vitamin B-12 250 MCG tablet Take 1 tablet by mouth daily 10/13/18   Juan Stock MD   Multiple Vitamins-Minerals (THERAPEUTIC MULTIVITAMIN-MINERALS) tablet Take 1 tablet by mouth daily 8/12/18   Dolores Devlin MD       REVIEW OF SYSTEMS    (2-9 systems for level 4, 10 or more for level 5)      Review of Systems   Constitutional: Negative for chills and fever. HENT: Negative for rhinorrhea and sore throat. Eyes: Negative for redness and itching. Respiratory: Negative for cough and shortness of breath. Cardiovascular: Negative for chest pain and leg swelling. Gastrointestinal: Negative for abdominal pain, diarrhea, nausea and vomiting. Genitourinary: Negative for dysuria, frequency and hematuria. Musculoskeletal: Positive for arthralgias and joint swelling (ankles). Skin: Negative for rash and wound. Allergic/Immunologic: Negative for environmental allergies and food allergies. Neurological: Negative for weakness and numbness. PHYSICAL EXAM   (up to 7 for level 4, 8 or more for level 5)      INITIAL VITALS:   BP (!) 152/90   Pulse 88   Temp 97.7 °F (36.5 °C)   Resp 16   Wt 85 lb (38.6 kg)   LMP 03/18/2015   SpO2 98%   BMI 17.17 kg/m²     Physical Exam  Vitals signs and nursing note reviewed. Constitutional:       General: She is not in acute distress. Appearance: Normal appearance. She is not ill-appearing, toxic-appearing or diaphoretic. HENT:      Head: Normocephalic and atraumatic. Eyes:      General: No scleral icterus. Neck:      Musculoskeletal: Neck supple. Cardiovascular:      Rate and Rhythm: Normal rate and regular rhythm. Pulses:           Dorsalis pedis pulses are 2+ on the right side and 2+ on the left side. Pulmonary:      Effort: Pulmonary effort is normal. No respiratory distress. Breath sounds: Normal breath sounds. No stridor. No wheezing, rhonchi or rales. Abdominal:      General: There is no distension. Palpations: Abdomen is soft.  There is neurovascularly intact with 2+ DP pulses as well as intact sensation. She is able to dorsi and plantarflex her foot without issue. No significant calf tenderness or edema. She reports ankle swelling there is none on exam.  She has mild tenderness to palpation over her left knee but again denies any trauma. Also tender on the left hip. She has pain at the very end of range of motion of her left hip but with no significant pain with passive range of motion to suggest a septic arthritis. Plan is for symptomatic treatment, reassess. Patient feels improved and ambulate out of the emergency department. Impression is acute on chronic left-sided hip pain. She does report some urinary frequency however she states that she has for months and had a recent urinalysis that was negative where she also complained of urinary frequency. Doubt urinalysis would be of value at this time. Discussed supportive care measures. Strict return precautions given. Patient instructed to follow with her primary care provider as soon as possible for follow up. Patient and/or family expressed understanding and agreement with this plan. PROCEDURES:  none    CONSULTS:  None    FINAL IMPRESSION      1.  Left hip pain          DISPOSITION / PLAN     DISPOSITION Decision To Discharge 03/22/2021 11:22:38 AM      PATIENT REFERRED TO:  [facility] ED  [address] [place] [address]  [phone]  Go to   If symptoms worsen      DISCHARGE MEDICATIONS:  Discharge Medication List as of 3/22/2021 11:22 AM          Ly Figueredo DO  Emergency Medicine Resident  [facility]    (Please note that portions of this note were completed with a voice recognition program.  Efforts were made to edit thedictations but occasionally words are mis-transcribed.)       Ly Figueredo DO  Resident  03/22/21 DO Valentín  Resident  03/22/21 1436

## 2021-03-22 NOTE — ED NOTES
Pt presented to ED for chronic left hip pain. Pt denies injury. Pt states she has appointment for pain management injection on Friday. Pt uses walker. Pt alert and oriented x 4. RR even and non labored.       Loan Suggs RN  03/22/21 Max Paulson RN  03/22/21 1798

## 2021-03-22 NOTE — ED PROVIDER NOTES
[address] [facility]     Emergency Department     Faculty Attestation        I performed a history and physical examination of the patient and discussed management with the resident. I reviewed the residents note and agree with the documented findings and plan of care. Any areas of disagreement are noted on the chart. I was personally present for the key portions of any procedures. I have documented in the chart those procedures where I was not present during the key portions. I have reviewed the emergency nurses triage note. I agree with the chief complaint, past medical history, past surgical history, allergies, medications, social and family history as documented unless otherwise noted below. For Physician Assistant/ Nurse Practitioner cases/documentation I have personally evaluated this patient and have completed at least one if not all key elements of the E/M (history, physical exam, and MDM). Additional findings are as noted. Vital Signs: BP: (!) 152/90  Pulse: 88  Resp: 16  Temp: 97.7 °F (36.5 °C) SpO2: 98 %  PCP:  No primary care provider on file. Pertinent Comments:         Critical Care  None    This patient was evaluated in the Emergency Department for symptoms described in the history of present illness. He/she was evaluated in the context of the global COVID-19 pandemic, which necessitated consideration that the patient might be at risk for infection with the SARS-CoV-2 virus that causes COVID-19. Institutional protocols and algorithms that pertain to the evaluation of patients at risk for COVID-19 are in a state of rapid change based on information released by regulatory bodies including the CDC and federal and state organizations. These policies and algorithms were followed during the patient's care in the ED.     (Please note that portions of this note were completed with a voice recognition program. Efforts were made to edit the dictations but occasionally words are mis-transcribed.  Whenever words are used in this note in any gender, they shall be construed as though they were used in the gender appropriate to the circumstances; and whenever words are used in this note in the singular or plural form, they shall be construed as though they were used in the form appropriate to the circumstances.)    MD Clemencia Velasquez  Attending Emergency Medicine Physician           Ana María Ga MD  03/22/21 103

## 2021-03-22 NOTE — ED NOTES
Bed: 49PED  Expected date:   Expected time:   Means of arrival:   Comments:     Catherine Watt RN  03/22/21 1016

## 2021-03-25 ENCOUNTER — APPOINTMENT (OUTPATIENT)
Dept: GENERAL RADIOLOGY | Age: 66
End: 2021-03-25
Attending: ANESTHESIOLOGY
Payer: MEDICARE

## 2021-03-25 ENCOUNTER — HOSPITAL ENCOUNTER (OUTPATIENT)
Age: 66
Setting detail: OUTPATIENT SURGERY
Discharge: HOME | End: 2021-03-25
Attending: ANESTHESIOLOGY | Admitting: ANESTHESIOLOGY
Payer: MEDICARE

## 2021-03-25 VITALS
TEMPERATURE: 96.8 F | HEART RATE: 75 BPM | RESPIRATION RATE: 16 BRPM | SYSTOLIC BLOOD PRESSURE: 139 MMHG | DIASTOLIC BLOOD PRESSURE: 82 MMHG | OXYGEN SATURATION: 98 %

## 2021-03-25 PROCEDURE — 77002 NEEDLE LOCALIZATION BY XRAY: CPT | Performed by: ANESTHESIOLOGY

## 2021-03-25 PROCEDURE — 3600000050 HC PAIN LEVEL 1 BASE: Performed by: ANESTHESIOLOGY

## 2021-03-25 PROCEDURE — 7100000011 HC PHASE II RECOVERY - ADDTL 15 MIN: Performed by: ANESTHESIOLOGY

## 2021-03-25 PROCEDURE — 2709999900 HC NON-CHARGEABLE SUPPLY: Performed by: ANESTHESIOLOGY

## 2021-03-25 PROCEDURE — 3209999900 FLUORO FOR SURGICAL PROCEDURES

## 2021-03-25 PROCEDURE — 20610 DRAIN/INJ JOINT/BURSA W/O US: CPT | Performed by: ANESTHESIOLOGY

## 2021-03-25 PROCEDURE — 7100000010 HC PHASE II RECOVERY - FIRST 15 MIN: Performed by: ANESTHESIOLOGY

## 2021-03-25 RX ORDER — SODIUM CHLORIDE 0.9 % (FLUSH) 0.9 %
10 SYRINGE (ML) INJECTION EVERY 12 HOURS SCHEDULED
Status: DISCONTINUED | OUTPATIENT
Start: 2021-03-25 | End: 2021-03-25 | Stop reason: HOSPADM

## 2021-03-25 RX ORDER — SODIUM CHLORIDE 0.9 % (FLUSH) 0.9 %
10 SYRINGE (ML) INJECTION PRN
Status: DISCONTINUED | OUTPATIENT
Start: 2021-03-25 | End: 2021-03-25 | Stop reason: HOSPADM

## 2021-03-25 NOTE — OP NOTE
Operative Note      Patient: Collin Ibanez  YOB: 1955  MRN: [id number]    Date of Procedure: 3/25/2021    Pre-Op Diagnosis: DX PRIMARY OA LEFT HIP    Post-Op Diagnosis: Same       Procedure(s):  STEROID INJECTION LEFT HIP  WITH FLUORO    Surgeon(s):  Alex Chavez MD    Assistant:   * No surgical staff found *    Anesthesia: Local    Estimated Blood Loss (mL): Minimal    Complications: None    Specimens:   * No specimens in log *    Implants:  * No implants in log *      Drains: * No LDAs found *    Findings: N/A    Detailed Description of Procedure:   PREOPERATIVE DIAGNOSIS:   Left hip osteoarthritis.     POSTOPERATIVE DIAGNOSIS:  Left hip osteoarthritis.     PROCEDURE PERFORMED:  Left hip steroid injection with fluoroscopy guidance.     ANESTHESIA:  Local skin infiltration.     BLOOD LOSS:  None.     COMPLICATION:  None.     OPERATIVE NOTE:  The patient was seen in the preoperative area. Chart was  reviewed. Informed consent was obtained. The patient was taken to the  procedure room and was placed in supine position. The area over the right hip  was prepped with ChloraPrep. A timeout was completed prior to the start of  the procedure. Standard monitors were connected and vital signs were  monitored throughout the procedure. The skin entry point was marked along the  greater trochanteric line. The skin and subcutaneous tissues were  anesthetized with 1% lidocaine. Then, a 25 gauge needle was advanced with an  oblique paramedian approach from greater trochanter towards the neck and head  of the femur. Once the bony contact was made, aspiration was positive for  thick yellow and pinkish tinged fluid. A total 3 mL of the treatment solution  containing 1 mL of dexamethasone 10 mg and 2 mL of 0.5% bupivacaine. The needle  was removed. The patient tolerated the procedure well.   There was no  complication.     Electronically signed by Alex Chavez MD on 3/25/2021 at 11:11 AM

## 2021-03-28 ENCOUNTER — HOSPITAL ENCOUNTER (EMERGENCY)
Age: 66
Discharge: HOME | End: 2021-03-28
Attending: EMERGENCY MEDICINE
Payer: MEDICARE

## 2021-03-28 VITALS
RESPIRATION RATE: 17 BRPM | HEART RATE: 99 BPM | DIASTOLIC BLOOD PRESSURE: 102 MMHG | TEMPERATURE: 97.2 F | SYSTOLIC BLOOD PRESSURE: 179 MMHG | OXYGEN SATURATION: 96 %

## 2021-03-28 DIAGNOSIS — G43.909 MIGRAINE WITHOUT STATUS MIGRAINOSUS, NOT INTRACTABLE, UNSPECIFIED MIGRAINE TYPE: Primary | ICD-10-CM

## 2021-03-28 LAB
-: NORMAL
AMORPHOUS: NORMAL
BACTERIA: NORMAL
BILIRUBIN URINE: NEGATIVE
CASTS UA: NORMAL /LPF (ref 0–8)
COLOR: YELLOW
CRYSTALS, UA: NORMAL /HPF
EPITHELIAL CELLS UA: NORMAL /HPF (ref 0–5)
GLUCOSE URINE: NEGATIVE
KETONES, URINE: NEGATIVE
LEUKOCYTE ESTERASE, URINE: NEGATIVE
MUCUS: NORMAL
NITRITE, URINE: NEGATIVE
OTHER OBSERVATIONS UA: NORMAL
PH UA: 6.5 (ref 5–8)
PROTEIN UA: NEGATIVE
RBC UA: NORMAL /HPF (ref 0–4)
RENAL EPITHELIAL, UA: NORMAL /HPF
SPECIFIC GRAVITY UA: 1.02 (ref 1–1.03)
TRICHOMONAS: NORMAL
TURBIDITY: CLEAR
URINE HGB: NEGATIVE
UROBILINOGEN, URINE: NORMAL
WBC UA: NORMAL /HPF (ref 0–5)
YEAST: NORMAL

## 2021-03-28 PROCEDURE — 87086 URINE CULTURE/COLONY COUNT: CPT

## 2021-03-28 PROCEDURE — 81001 URINALYSIS AUTO W/SCOPE: CPT

## 2021-03-28 PROCEDURE — 99283 EMERGENCY DEPT VISIT LOW MDM: CPT

## 2021-03-28 PROCEDURE — 6370000000 HC RX 637 (ALT 250 FOR IP): Performed by: STUDENT IN AN ORGANIZED HEALTH CARE EDUCATION/TRAINING PROGRAM

## 2021-03-28 RX ORDER — BUTALBITAL, ACETAMINOPHEN AND CAFFEINE 50; 325; 40 MG/1; MG/1; MG/1
1 TABLET ORAL EVERY 12 HOURS
Qty: 5 TABLET | Refills: 0 | Status: ON HOLD | OUTPATIENT
Start: 2021-03-28 | End: 2021-05-13 | Stop reason: HOSPADM

## 2021-03-28 RX ORDER — BUTALBITAL, ACETAMINOPHEN AND CAFFEINE 50; 325; 40 MG/1; MG/1; MG/1
1 TABLET ORAL ONCE
Status: COMPLETED | OUTPATIENT
Start: 2021-03-28 | End: 2021-03-28

## 2021-03-28 RX ADMIN — BUTALBITAL, ACETAMINOPHEN AND CAFFEINE 1 TABLET: 50; 325; 40 TABLET ORAL at 10:45

## 2021-03-28 ASSESSMENT — ENCOUNTER SYMPTOMS
NAUSEA: 0
SHORTNESS OF BREATH: 0
COUGH: 0
SINUS PRESSURE: 1
ABDOMINAL PAIN: 0
VOMITING: 0
RHINORRHEA: 1

## 2021-03-28 ASSESSMENT — PAIN DESCRIPTION - DESCRIPTORS: DESCRIPTORS: ACHING

## 2021-03-28 ASSESSMENT — PAIN DESCRIPTION - PAIN TYPE: TYPE: ACUTE PAIN

## 2021-03-28 NOTE — ED PROVIDER NOTES
[facility]  ED  eMERGENCY dEPARTMENT eNCOUnter   Attending Attestation     Pt Name: Svitlana Houston  MRN: [id number]  [place] 1955  Date of evaluation: 3/28/21       Emi Rodriguez is a 77 y.o. female who presents with Headache      History: Patient presents with headache. Patient also says she has frequent urination and low abdominal pain. Exam: Heart rate rhythm regular. Lungs are clear to auscultation bilaterally. Abdomen is soft, some tenderness in the suprapubic area. Plan for urinalysis and Fioricet for chronic recurrent unchanged migraine. Will treat urine if concerning. I performed a history and physical examination of the patient and discussed management with the resident. I reviewed the residents note and agree with the documented findings and plan of care. Any areas of disagreement are noted on the chart. I was personally present for the key portions of any procedures. I have documented in the chart those procedures where I was not present during the key portions. I have personally reviewed all images and agree with the resident's interpretation. I have reviewed the emergency nurses triage note. I agree with the chief complaint, past medical history, past surgical history, allergies, medications, social and family history as documented unless otherwise noted below. Documentation of the HPI, Physical Exam and Medical Decision Making performed by medical students or scribes is based on my personal performance of the HPI, PE and MDM. For Phys Assistant/ Nurse Practitioner cases/documentation I have had a face to face evaluation of this patient and have completed at least one if not all key elements of the E/M (history, physical exam, and MDM). Additional findings are as noted. For APC cases I have personally evaluated and examined the patient in conjunction with the APC and agree with the treatment plan and disposition of the patient as recorded by the APC.     Charity Jason, MD  Attending Emergency  Physician       Doron Momin MD  03/28/21 [id number]

## 2021-03-28 NOTE — ED PROVIDER NOTES
section; LEEP (2002); Upper gastrointestinal endoscopy (); Gastric bypass surgery; Appendectomy (2017); laparoscopic appendectomy (N/A, 3/27/2017); Upper gastrointestinal endoscopy (2018); Upper gastrointestinal endoscopy (N/A, 2018); pr colonoscopy w/biopsy single/multiple (N/A, 11/10/2018); pr egd transoral biopsy single/multiple (N/A, 2018); hip surgery (Right, 2019); Total hip arthroplasty (Right, 2019); and Medication Injection (Left, 3/25/2021).     Social History     Socioeconomic History    Marital status: Single     Spouse name: Not on file    Number of children: Not on file    Years of education: Not on file    Highest education level: Not on file   Occupational History    Not on file   Social Needs    Financial resource strain: Not on file    Food insecurity     Worry: Not on file     Inability: Not on file    Transportation needs     Medical: Not on file     Non-medical: Not on file   Tobacco Use    Smoking status: Former Smoker     Packs/day: 1.00     Years: 40.00     Pack years: 40.00     Types: Cigarettes     Start date:      Quit date: 2018     Years since quittin.4    Smokeless tobacco: Never Used   Substance and Sexual Activity    Alcohol use: No     Alcohol/week: 0.0 standard drinks    Drug use: No     Types: IV, Cocaine     Comment: past history    Sexual activity: Not on file   Lifestyle    Physical activity     Days per week: Not on file     Minutes per session: Not on file    Stress: Not on file   Relationships    Social connections     Talks on phone: Not on file     Gets together: Not on file     Attends Mu-ism service: Not on file     Active member of club or organization: Not on file     Attends meetings of clubs or organizations: Not on file     Relationship status: Not on file    Intimate partner violence     Fear of current or ex partner: Not on file     Emotionally abused: Not on file     Physically abused: Not on file Forced sexual activity: Not on file   Other Topics Concern    Not on file   Social History Narrative    Not on file       Family History   Problem Relation Age of Onset    Breast Cancer Mother     Heart Disease Father        Allergies:    Aspirin and Keflet [cephalexin]    Home Medications:  Prior to Admission medications    Medication Sig Start Date End Date Taking? Authorizing Provider   butalbital-acetaminophen-caffeine (FIORICET, ESGIC) -23 MG per tablet Take 1 tablet by mouth every 12 hours for 5 doses 3/28/21 3/31/21 Yes Adrianna Khan MD   gabapentin (NEURONTIN) 300 MG capsule Take 1 capsule by mouth 3 times daily for 30 days. 3/16/21 4/15/21  Andra Rios MD   traMADol (ULTRAM) 50 MG tablet Take 1 tablet by mouth 2 times daily as needed for Pain for up to 30 days. 3/16/21 4/15/21  Andra Rios MD   albuterol sulfate HFA (PROVENTIL HFA) 108 (90 Base) MCG/ACT inhaler Inhale 1-2 puffs into the lungs once for 1 dose 3/13/21 3/13/21  Susan Chopra,    Elastic Bandages & Supports (LUMBAR BACK BRACE/SUPPORT PAD) MISC 1 Units by Does not apply route daily 2/10/21 2/10/22  Andra Rios MD   lisinopril (PRINIVIL;ZESTRIL) 10 MG tablet Take 1 tablet by mouth daily 2/9/21   Jaky Smith MD   amLODIPine (NORVASC) 10 MG tablet Take 1 tablet by mouth daily 2/9/21 2/9/21  Jaky Smith MD   mirtazapine (REMERON) 30 MG tablet  11/3/20   Historical Provider, MD   Sennosides (SENNA) 8.6 MG CAPS 8.6 capsules 8/2/20   Historical Provider, MD   apixaban (ELIQUIS) 5 MG TABS tablet Take 1 tablet by mouth 2 times daily 8/7/20   Bary Dandy, MD   amitriptyline (ELAVIL) 10 MG tablet Take 1 tablet by mouth nightly 1/31/20   Claudia Rowe DO   atorvastatin (LIPITOR) 40 MG tablet Take 1 tablet by mouth nightly 1/31/20   Claudia Rowe DO   clopidogrel (PLAVIX) 75 MG tablet Take 1 tablet by mouth daily 2/1/20   Claudia Rowe, DO   Misc.  Devices [facility]) MISC 1 Device by Does not apply route once for 1 dose 8/5/19 8/5/19  Cindy Stearns PA-C   carvedilol (COREG) 6.25 MG tablet Take 1 tablet by mouth 2 times daily (with meals) 2/7/19   Tammy Briscoe MD   ferrous sulfate (MELISSA-HAO) 325 (65 Fe) MG tablet Take 1 tablet by mouth daily 2/7/19   Tammy Briscoe MD   melatonin 1 MG tablet Take 1 tablet by mouth nightly as needed for Sleep 1/11/19   Wilbert Medrano MD   ipratropium-albuterol (DUONEB) 0.5-2.5 (3) MG/3ML SOLN nebulizer solution Inhale 3 mLs into the lungs every 4 hours as needed for Shortness of Breath 1/11/19   Wilbert Medrano MD   mometasone-formoterol [facility]) 100-5 MCG/ACT inhaler Inhale 2 puffs into the lungs 2 times daily 12/5/18   Peter Jimenez MD   pantoprazole (PROTONIX) 40 MG tablet Take 1 tablet by mouth 2 times daily (before meals)  Patient not taking: Reported on 12/8/2020 11/13/18   Tommie Venegas MD   folic acid (FOLVITE) 1 MG tablet Take 1 tablet by mouth daily 10/13/18   Andre Burns MD   vitamin B-1 100 MG tablet Take 1 tablet by mouth daily 10/13/18   Andre Burns MD   vitamin B-12 250 MCG tablet Take 1 tablet by mouth daily 10/13/18   Andre Burns MD   Multiple Vitamins-Minerals (THERAPEUTIC MULTIVITAMIN-MINERALS) tablet Take 1 tablet by mouth daily 8/12/18   Anna Reynoso MD       REVIEW OF SYSTEMS    (2-9 systems for level 4, 10 or more for level 5)    Review of Systems   Constitutional: Negative for chills, fatigue and fever. HENT: Positive for rhinorrhea and sinus pressure. Negative for congestion. Clear rhinorrhea   Respiratory: Negative for cough and shortness of breath. Cardiovascular: Negative for chest pain. Gastrointestinal: Negative for abdominal pain, nausea and vomiting. Genitourinary: Negative for flank pain and frequency. Musculoskeletal: Negative for myalgias. Neurological: Positive for headaches. Negative for dizziness, light-headedness and numbness. All other systems reviewed and are negative.       PHYSICAL EXAM (up to 7 for level 4, 8 or more for level 5)    INITIAL VITALS:   ED Triage Vitals   BP Temp Temp Source Pulse Resp SpO2 Height Weight   03/28/21 0948 03/28/21 0940 03/28/21 0940 03/28/21 0948 03/28/21 0948 03/28/21 0948 -- --   (!) 179/102 97.2 °F (36.2 °C) Temporal 99 17 96 %         Physical Exam  Vitals signs and nursing note reviewed. Constitutional:       General: She is not in acute distress. Appearance: She is well-developed. HENT:      Head: Normocephalic and atraumatic. No raccoon eyes, Yee's sign, right periorbital erythema or left periorbital erythema. Nose:      Right Turbinates: Swollen. Left Turbinates: Swollen. Eyes:      General: No visual field deficit. Cardiovascular:      Rate and Rhythm: Normal rate and regular rhythm. Pulses:           Radial pulses are 2+ on the right side and 2+ on the left side. Heart sounds: Normal heart sounds. No murmur. Pulmonary:      Effort: Pulmonary effort is normal. No respiratory distress. Breath sounds: Normal breath sounds. No decreased breath sounds. Abdominal:      General: Bowel sounds are normal. There is no distension. Palpations: Abdomen is soft. Tenderness: There is no abdominal tenderness. Skin:     General: Skin is warm and dry. Capillary Refill: Capillary refill takes less than 2 seconds. Neurological:      General: No focal deficit present. Mental Status: She is alert and oriented to person, place, and time. Cranial Nerves: Cranial nerves are intact. No dysarthria or facial asymmetry. Sensory: Sensation is intact. No sensory deficit. Motor: Motor function is intact.          DIFFERENTIAL  DIAGNOSIS   PLAN (LABS / IMAGING / EKG):  Orders Placed This Encounter   Procedures    Culture, Urine    Urinalysis with microscopic       MEDICATIONS ORDERED:  Orders Placed This Encounter   Medications    butalbital-acetaminophen-caffeine (FIORICET, ESGIC) per tablet 1 tablet    butalbital-acetaminophen-caffeine (FIORICET, ESGIC) -40 MG per tablet     Sig: Take 1 tablet by mouth every 12 hours for 5 doses     Dispense:  5 tablet     Refill:  0         DIAGNOSTIC RESULTS / EMERGENCYDEPARTMENT COURSE / MDM   LABS:  Labs Reviewed   CULTURE, URINE   URINALYSIS WITH MICROSCOPIC       RADIOLOGY:  No results found. Impression:  Patient presents with concern for migraine. Ongoing for the past 2 days. Patient states is normally takes Fioricet but she is out of it. Did take her blood pressure medication lisinopril and carvedilol this morning her blood pressure still elevated. Upon my evaluation, patient was only complaining of a headache however for attending evaluation she stated that she is having some increased urinary frequency and some suprapubic tenderness. Patient is concerned that she has a UTI. Will check a urinalysis, give Fioricet and reassess. EMERGENCY DEPARTMENT COURSE:   Urinalysis was negative for infection. Headache improved dramatically after Fioricet. Instructed patient to follow-up with a primary care provider for ongoing management of her chronic medical problems. Patient safe for discharge. MDM  Number of Diagnoses or Management Options  Migraine without status migrainosus, not intractable, unspecified migraine type: new, needed workup     Amount and/or Complexity of Data Reviewed  Clinical lab tests: ordered and reviewed  Review and summarize past medical records: yes  Discuss the patient with other providers: yes    Risk of Complications, Morbidity, and/or Mortality  Presenting problems: low  Diagnostic procedures: low  Management options: low    Patient Progress  Patient progress: stable      PROCEDURES:  none    CONSULTS:  None    CRITICAL CARE:  Please see attending note    FINAL IMPRESSION     1.  Migraine without status migrainosus, not intractable, unspecified migraine type          DISPOSITION / PLAN   DISPOSITION Decision To Discharge

## 2021-03-29 ENCOUNTER — APPOINTMENT (OUTPATIENT)
Dept: GENERAL RADIOLOGY | Age: 66
End: 2021-03-29
Payer: MEDICARE

## 2021-03-29 ENCOUNTER — HOSPITAL ENCOUNTER (EMERGENCY)
Age: 66
Discharge: LEFT BEFORE DISCHARGE | End: 2021-03-29
Attending: EMERGENCY MEDICINE
Payer: MEDICARE

## 2021-03-29 VITALS
SYSTOLIC BLOOD PRESSURE: 154 MMHG | RESPIRATION RATE: 20 BRPM | DIASTOLIC BLOOD PRESSURE: 101 MMHG | OXYGEN SATURATION: 99 % | HEART RATE: 88 BPM | BODY MASS INDEX: 17.17 KG/M2 | WEIGHT: 85 LBS | TEMPERATURE: 97.3 F

## 2021-03-29 DIAGNOSIS — R06.00 DYSPNEA, UNSPECIFIED TYPE: Primary | ICD-10-CM

## 2021-03-29 LAB
CULTURE: NORMAL
Lab: NORMAL
SARS-COV-2, RAPID: NOT DETECTED
SPECIMEN DESCRIPTION: NORMAL
SPECIMEN DESCRIPTION: NORMAL

## 2021-03-29 PROCEDURE — 6370000000 HC RX 637 (ALT 250 FOR IP): Performed by: STUDENT IN AN ORGANIZED HEALTH CARE EDUCATION/TRAINING PROGRAM

## 2021-03-29 PROCEDURE — 93005 ELECTROCARDIOGRAM TRACING: CPT | Performed by: EMERGENCY MEDICINE

## 2021-03-29 PROCEDURE — 99285 EMERGENCY DEPT VISIT HI MDM: CPT

## 2021-03-29 PROCEDURE — 87635 SARS-COV-2 COVID-19 AMP PRB: CPT

## 2021-03-29 PROCEDURE — 74019 RADEX ABDOMEN 2 VIEWS: CPT

## 2021-03-29 PROCEDURE — 71045 X-RAY EXAM CHEST 1 VIEW: CPT

## 2021-03-29 RX ORDER — ACETAMINOPHEN 500 MG
1000 TABLET ORAL ONCE
Status: COMPLETED | OUTPATIENT
Start: 2021-03-29 | End: 2021-03-29

## 2021-03-29 RX ORDER — KETOROLAC TROMETHAMINE 15 MG/ML
15 INJECTION, SOLUTION INTRAMUSCULAR; INTRAVENOUS ONCE
Status: DISCONTINUED | OUTPATIENT
Start: 2021-03-29 | End: 2021-03-29 | Stop reason: HOSPADM

## 2021-03-29 RX ADMIN — ACETAMINOPHEN 1000 MG: 500 TABLET ORAL at 10:47

## 2021-03-29 ASSESSMENT — PAIN DESCRIPTION - PAIN TYPE: TYPE: CHRONIC PAIN

## 2021-03-29 ASSESSMENT — PAIN SCALES - GENERAL: PAINLEVEL_OUTOF10: 9

## 2021-03-29 NOTE — ED NOTES
Pt refusing IV access at this time. Pt states \"i'm ready to get out of here\". Dr. Robert Leblanc at bedside.      Charlene Witt RN  03/29/21 1040

## 2021-03-29 NOTE — ED NOTES
Bed: 18  Expected date:   Expected time:   Means of arrival:   Comments:     Rocky Tomsa, ANTELMO  03/29/21 9960

## 2021-03-29 NOTE — ED PROVIDER NOTES
[facility]     Emergency Department     Faculty Attestation    I performed a history and physical examination of the patient and discussed management with the resident. I reviewed the residents note and agree with the documented findings and plan of care. Any areas of disagreement are noted on the chart. I was personally present for the key portions of any procedures. I have documented in the chart those procedures where I was not present during the key portions. I have reviewed the emergency nurses triage note. I agree with the chief complaint, past medical history, past surgical history, allergies, medications, social, and family history as documented unless otherwise noted below. This is a 47-year-old female who presents to the emergency department for evaluation of multiple concerns  Patient does smoke  She reports that for the last several days she has had worsening shortness of breath  Remote history of home oxygen use  Notes a nonproductive cough  Notes intermittent chest pain yesterday  No chest pain today  No palpitations or near syncope  No fever  No HEENT complaints  No wheezing  Patient does report that she uses an albuterol inhaler without relief of her shortness of breath  Notes intermittent upper abdominal discomfort  No nausea/vomiting  No genitourinary stool complaints  No lower extremity edema or calf pain    On examination she appears no acute distress  Speaking in full sentences  Lungs show very mild coarseness of breath sounds bilaterally  Normal respiratory effort  No retractions  No wheezing  Heart regular in rate and rhythm  Abdomen soft with minimal upper abdominal discomfort  No rebound tenderness or guarding  Normal bowel sounds  Normal peripheral perfusion and skin turgor  No pretibial edema  No calf pain    EKG at 9:10 AM shows a sinus rhythm with a rate of 95 bpm.  Intervals within normal limits. Normal axis. Poor R wave progression.   T wave inversions in V5/V6. Non-specific ST segment changes inferiorly. No consistent ST segment elevation or evidence for acute infarction. EKG compared to October 20, 2020 shows improvement of inferior ST segment/T wave changes.     Benign chest and abdominal plain films  No pneumonia or obstruction    COVID negative    Refusing blood work  Patient refusing further observation, treatment, assessment, or intervention    Patient will be discharged [facility]  Attending Emergency Physician       Kim Francis DO  03/29/21 1275

## 2021-03-29 NOTE — ED NOTES
Pt to ED with complaints of shortness of breath. Pt states that she woke up this morning with difficulty breathing. Pt has hx of \"heart and lung issues\". Pt states that she has not been around anyone with Covid. Pt is speaking in full sentences at this time. Pt sats at 98% on RA. Pt requesting oxygen for comfort. Placed on 2L NC. Pt has some abdominal pain as well. No N/V/D at this time. Pt placed on full cardiac monitor. EKG obtained. Ultrasound IV needed. Dr. Jaspal Villatoro at bedside.      Susy Marie, RN  03/29/21 1038 05-Feb-2019 11:01

## 2021-03-31 LAB
EKG ATRIAL RATE: 95 BPM
EKG P AXIS: 61 DEGREES
EKG P-R INTERVAL: 142 MS
EKG Q-T INTERVAL: 374 MS
EKG QRS DURATION: 66 MS
EKG QTC CALCULATION (BAZETT): 469 MS
EKG R AXIS: 61 DEGREES
EKG T AXIS: 20 DEGREES
EKG VENTRICULAR RATE: 95 BPM

## 2021-03-31 PROCEDURE — 93010 ELECTROCARDIOGRAM REPORT: CPT | Performed by: INTERNAL MEDICINE

## 2021-03-31 NOTE — ED PROVIDER NOTES
[facility]  ED  Emergency Department Encounter  EmergencyMedicine Resident     Pt Name:Emi Navarrete  MRN: [id number]  Birthdate 1955  Date of evaluation: 3/31/21  PCP:  No primary care provider on file. CHIEF COMPLAINT       Chief Complaint   Patient presents with    Shortness of Breath       HISTORY OF PRESENT ILLNESS  (Location/Symptom, Timing/Onset, Context/Setting, Quality, Duration, Modifying Factors, Severity.)      Emi Daniels is a 77 y.o. female who presents with numerous chief complaints including shortness of breath that started yesterday episode of chest pain that happened yesterday with activity but has resolved today. Left hip pain 10/10 chronic ongoing for years. Patient wears 2 to liters nasal cannula at baseline but is homeless and has had a hard time getting her oxygen. She uses albuterol treatments daily for COPD asthma shortness of breath. Daily smoker pack a day. History of polysubstance drug abuse crack cocaine. Denying vomiting headache dizziness abdominal pain diarrhea fevers. PAST MEDICAL / SURGICAL / SOCIAL / FAMILY HISTORY      has a past medical history of Appendicitis, Cerebral artery occlusion with cerebral infarction ([address] CHF (congestive heart failure) (HCC), Chronic respiratory failure with hypoxia and hypercapnia (HCC), Chronic rhinitis, Cigarette smoker, COPD (chronic obstructive pulmonary disease) ([address] Depression, Dysphagia, Essential hypertension, GERD (gastroesophageal reflux disease), Heart failure, diastolic, with acute decompensation ([address] Hepatitis C, chronic ([address] History of smoking at least 1 pack per day for at least 30 years, Hyperlipidemia, Hypertension, Migraine, Moderate COPD (chronic obstructive pulmonary disease) ([address] Multiple transfusions, Neurogenic dysphagia, Osteoarthritis, Pneumonia, Scoliosis, and Substance abuse ([address] has a past surgical history that includes  section; MORIAH (2002);  Upper gastrointestinal endoscopy (); Gastric bypass surgery; Appendectomy (2017); laparoscopic appendectomy (N/A, 3/27/2017); Upper gastrointestinal endoscopy (2018); Upper gastrointestinal endoscopy (N/A, 2018); pr colonoscopy w/biopsy single/multiple (N/A, 11/10/2018); pr egd transoral biopsy single/multiple (N/A, 2018); hip surgery (Right, 2019); Total hip arthroplasty (Right, 2019); and Medication Injection (Left, 3/25/2021).     Social History     Socioeconomic History    Marital status: Single     Spouse name: Not on file    Number of children: Not on file    Years of education: Not on file    Highest education level: Not on file   Occupational History    Not on file   Social Needs    Financial resource strain: Not on file    Food insecurity     Worry: Not on file     Inability: Not on file    Transportation needs     Medical: Not on file     Non-medical: Not on file   Tobacco Use    Smoking status: Former Smoker     Packs/day: 1.00     Years: 40.00     Pack years: 40.00     Types: Cigarettes     Start date:      Quit date: 2018     Years since quittin.4    Smokeless tobacco: Never Used   Substance and Sexual Activity    Alcohol use: No     Alcohol/week: 0.0 standard drinks    Drug use: No     Types: IV, Cocaine     Comment: past history    Sexual activity: Not on file   Lifestyle    Physical activity     Days per week: Not on file     Minutes per session: Not on file    Stress: Not on file   Relationships    Social connections     Talks on phone: Not on file     Gets together: Not on file     Attends Jainism service: Not on file     Active member of club or organization: Not on file     Attends meetings of clubs or organizations: Not on file     Relationship status: Not on file    Intimate partner violence     Fear of current or ex partner: Not on file     Emotionally abused: Not on file     Physically abused: Not on file     Forced sexual activity: Not on file   Other Topics Concern    Not on file   Social History Narrative    Not on file       Family History   Problem Relation Age of Onset    Breast Cancer Mother     Heart Disease Father        Allergies:  Aspirin and Keflet [cephalexin]    Home Medications:  Prior to Admission medications    Medication Sig Start Date End Date Taking? Authorizing Provider   butalbital-acetaminophen-caffeine (FIORICET, ESGIC) -82 MG per tablet Take 1 tablet by mouth every 12 hours for 5 doses 3/28/21 3/31/21  Tasia Bernardo MD   gabapentin (NEURONTIN) 300 MG capsule Take 1 capsule by mouth 3 times daily for 30 days. 3/16/21 4/15/21  Alejandro Jimenez MD   traMADol (ULTRAM) 50 MG tablet Take 1 tablet by mouth 2 times daily as needed for Pain for up to 30 days. 3/16/21 4/15/21  Alejandro Jimenez MD   albuterol sulfate HFA (PROVENTIL HFA) 108 (90 Base) MCG/ACT inhaler Inhale 1-2 puffs into the lungs once for 1 dose 3/13/21 3/13/21  Francis Daughters, DO   Elastic Bandages & Supports (LUMBAR BACK BRACE/SUPPORT PAD) MISC 1 Units by Does not apply route daily 2/10/21 2/10/22  Alejandro Jimenez MD   lisinopril (PRINIVIL;ZESTRIL) 10 MG tablet Take 1 tablet by mouth daily 2/9/21   Carley Good MD   amLODIPine (NORVASC) 10 MG tablet Take 1 tablet by mouth daily 2/9/21 2/9/21  Carley Good MD   mirtazapine (REMERON) 30 MG tablet  11/3/20   Historical Provider, MD   Sennosides (SENNA) 8.6 MG CAPS 8.6 capsules 8/2/20   Historical Provider, MD   apixaban (ELIQUIS) 5 MG TABS tablet Take 1 tablet by mouth 2 times daily 8/7/20   Prabha Layton MD   amitriptyline (ELAVIL) 10 MG tablet Take 1 tablet by mouth nightly 1/31/20   Jonh Tavarez DO   atorvastatin (LIPITOR) 40 MG tablet Take 1 tablet by mouth nightly 1/31/20   Jonh Tavarez DO   clopidogrel (PLAVIX) 75 MG tablet Take 1 tablet by mouth daily 2/1/20   Jonh Tavarez, DO   Misc.  Devices [facility]) MISC 1 Device by Does not apply route once for 1 dose 8/5/19 8/5/19 EXAM   (up to 7 for level 4, 8 or more for level 5)      INITIAL VITALS:   BP (!) 154/101   Pulse 88   Temp 97.3 °F (36.3 °C) (Temporal)   Resp 20   Wt 85 lb (38.6 kg)   LMP 03/18/2015   SpO2 99%   BMI 17.17 kg/m²     Physical Exam  Constitutional:       General: Not in acute distress. Appearance: Normal appearance. Normal weight. Not toxic-appearing. HENT:      Head: Normocephalic and atraumatic. Nose: Nose normal.      Mouth/Throat: Mucous membranes are moist.  Uvula midline no oropharyngeal edema. Pharynx: Oropharynx is clear. Eyes:      Extraocular Movements: Extraocular movements intact. Conjunctiva/sclera: Conjunctivae normal.      Pupils: Pupils are equal, round, and reactive to light. Neck:      Musculoskeletal: Normal range of motion and neck supple. No neck rigidity. Cardiovascular:      Rate and Rhythm: Normal rate and regular rhythm. Pulses: Normal pulses. Heart sounds: Normal heart sounds. No murmur. Pulmonary:      Effort: Pulmonary effort is normal.      Breath sounds: Normal breath sounds. No wheezing. Abdominal:      General: Abdomen is flat. Bowel sounds are normal.      Tenderness: There is no abdominal tenderness. Musculoskeletal:     Normal range of motion. General: No swelling or tenderness. No LE edema    Skin:     General: Skin is warm. Capillary Refill: Capillary refill takes less than 2 seconds. Coloration: Skin is not jaundiced. Neurological:      General: No focal deficit present. Mental Status: Alert and oriented to person, place, and time. Mental status is at baseline. Motor: No weakness.        DIFFERENTIAL  DIAGNOSIS     PLAN (LABS / IMAGING / EKG):  Orders Placed This Encounter   Procedures    COVID-19, Rapid    XR CHEST PORTABLE    XR ABDOMEN (2 VIEWS)    EKG 12 Lead    EKG REPORT       MEDICATIONS ORDERED:  Orders Placed This Encounter   Medications    acetaminophen (TYLENOL) tablet 1,000 mg    DISCONTD: ketorolac (TORADOL) injection 15 mg           DIAGNOSTIC RESULTS / EMERGENCY DEPARTMENT COURSE / MDM     LABS:  Results for orders placed or performed during the hospital encounter of 03/29/21   COVID-19, Rapid    Specimen: Nasopharyngeal Swab   Result Value Ref Range    Specimen Description . NASOPHARYNGEAL SWAB     SARS-CoV-2, Rapid Not Detected Not Detected   EKG 12 Lead   Result Value Ref Range    Ventricular Rate 95 BPM    Atrial Rate 95 BPM    P-R Interval 142 ms    QRS Duration 66 ms    Q-T Interval 374 ms    QTc Calculation (Bazett) 469 ms    P Axis 61 degrees    R Axis 61 degrees    T Axis 20 degrees         RADIOLOGY:  None    EKG  EKG Interpretation    Interpreted by me    Rhythm: normal sinus   Rate: normal  Axis: normal  Ectopy: none  Conduction: normal  ST Segments: no acute change  T Waves: V1 to V4 T wave inversions  Q Waves: none    Clinical Impression: no acute changes and normal EKG    All EKG's are interpreted by the Emergency Department Physician who either signs or Co-signs this chart in the absence of a cardiologist.    EMERGENCY DEPARTMENT COURSE:  Patient breathing quietly and unlabored on room air. Speech is normal and speaking in full sentences without requiring to pause to take a breath. Clinically patient appears well SPO2 was 99% on room air vital signs stable, afebrile. Patient ambulatory with baseline gait. No adventitious heart or lung sounds. Given symptoms we will do chest x-ray with D-dimer MI ACS work-up. Patient refusing blood work would like to be discharged [address] Explained the consequences to the patient including death and attempted to arrange follow-up however she declined and wants to go home. Instructed to return emergency department if she would like to continue her treatment. PROCEDURES:  None    CONSULTS:  None    CRITICAL CARE:  None    FINAL IMPRESSION      1.  Dyspnea, unspecified type          DISPOSITION / PLAN DISPOSITION [place] 03/29/2021 10:51:50 AM      PATIENT REFERRED TO:  [address] [address] [phone]  Schedule an appointment as soon as possible for a visit today        DISCHARGE MEDICATIONS:  Discharge Medication List as of 3/29/2021 10:52 AM          Damita Runner, MD  Emergency Medicine Resident    (Please note that portions of thisnote were completed with a voice recognition program.  Efforts were made to edit the dictations but occasionally words are mis-transcribed.)       Damita Runner, MD  Resident  03/31/21 [id number]

## 2021-04-03 ENCOUNTER — APPOINTMENT (OUTPATIENT)
Dept: CT IMAGING | Age: 66
DRG: 249 | End: 2021-04-03
Payer: MEDICARE

## 2021-04-03 ENCOUNTER — HOSPITAL ENCOUNTER (INPATIENT)
Age: 66
LOS: 2 days | Discharge: NURSING, REHAB OR LONG-TERM CARE | DRG: 249 | End: 2021-04-06
Attending: EMERGENCY MEDICINE | Admitting: INTERNAL MEDICINE
Payer: MEDICARE

## 2021-04-03 DIAGNOSIS — K86.89 PANCREATIC MASS: ICD-10-CM

## 2021-04-03 DIAGNOSIS — K52.9 COLITIS: ICD-10-CM

## 2021-04-03 DIAGNOSIS — R10.9 RIGHT FLANK PAIN: Primary | ICD-10-CM

## 2021-04-03 DIAGNOSIS — M16.12 PRIMARY OSTEOARTHRITIS OF LEFT HIP: ICD-10-CM

## 2021-04-03 DIAGNOSIS — K29.00 ACUTE GASTRITIS WITHOUT HEMORRHAGE, UNSPECIFIED GASTRITIS TYPE: ICD-10-CM

## 2021-04-03 LAB
ABSOLUTE EOS #: 0.07 K/UL (ref 0–0.44)
ABSOLUTE IMMATURE GRANULOCYTE: 0.04 K/UL (ref 0–0.3)
ABSOLUTE LYMPH #: 1.82 K/UL (ref 1.1–3.7)
ABSOLUTE MONO #: 0.46 K/UL (ref 0.1–1.2)
ALBUMIN SERPL-MCNC: 5.1 G/DL (ref 3.5–5.2)
ALBUMIN/GLOBULIN RATIO: 1 (ref 1–2.5)
ALP BLD-CCNC: 111 U/L (ref 35–104)
ALT SERPL-CCNC: 51 U/L (ref 5–33)
ANION GAP SERPL CALCULATED.3IONS-SCNC: 19 MMOL/L (ref 9–17)
AST SERPL-CCNC: 66 U/L
BASOPHILS # BLD: 0 % (ref 0–2)
BASOPHILS ABSOLUTE: <0.03 K/UL (ref 0–0.2)
BILIRUB SERPL-MCNC: 0.84 MG/DL (ref 0.3–1.2)
BNP INTERPRETATION: ABNORMAL
BUN BLDV-MCNC: 12 MG/DL (ref 8–23)
BUN/CREAT BLD: ABNORMAL (ref 9–20)
CALCIUM SERPL-MCNC: 10.6 MG/DL (ref 8.6–10.4)
CHLORIDE BLD-SCNC: 96 MMOL/L (ref 98–107)
CO2: 18 MMOL/L (ref 20–31)
CREAT SERPL-MCNC: 0.56 MG/DL (ref 0.5–0.9)
DIFFERENTIAL TYPE: ABNORMAL
EOSINOPHILS RELATIVE PERCENT: 1 % (ref 1–4)
GFR AFRICAN AMERICAN: >60 ML/MIN
GFR NON-AFRICAN AMERICAN: >60 ML/MIN
GFR SERPL CREATININE-BSD FRML MDRD: ABNORMAL ML/MIN/{1.73_M2}
GFR SERPL CREATININE-BSD FRML MDRD: ABNORMAL ML/MIN/{1.73_M2}
GLUCOSE BLD-MCNC: 91 MG/DL (ref 70–99)
HCT VFR BLD CALC: 52.2 % (ref 36.3–47.1)
HEMOGLOBIN: 17.5 G/DL (ref 11.9–15.1)
IMMATURE GRANULOCYTES: 1 %
LIPASE: 44 U/L (ref 13–60)
LYMPHOCYTES # BLD: 26 % (ref 24–43)
MCH RBC QN AUTO: 32.6 PG (ref 25.2–33.5)
MCHC RBC AUTO-ENTMCNC: 33.5 G/DL (ref 28.4–34.8)
MCV RBC AUTO: 97.2 FL (ref 82.6–102.9)
MONOCYTES # BLD: 7 % (ref 3–12)
NRBC AUTOMATED: 0 PER 100 WBC
PDW BLD-RTO: 14.5 % (ref 11.8–14.4)
PLATELET # BLD: ABNORMAL K/UL (ref 138–453)
PLATELET ESTIMATE: ABNORMAL
PLATELET, FLUORESCENCE: 393 K/UL (ref 138–453)
PLATELET, IMMATURE FRACTION: 1.2 % (ref 1.1–10.3)
PMV BLD AUTO: ABNORMAL FL (ref 8.1–13.5)
POTASSIUM SERPL-SCNC: 4.8 MMOL/L (ref 3.7–5.3)
PRO-BNP: 742 PG/ML
RBC # BLD: 5.37 M/UL (ref 3.95–5.11)
RBC # BLD: ABNORMAL 10*6/UL
SEG NEUTROPHILS: 66 % (ref 36–65)
SEGMENTED NEUTROPHILS ABSOLUTE COUNT: 4.61 K/UL (ref 1.5–8.1)
SODIUM BLD-SCNC: 133 MMOL/L (ref 135–144)
TOTAL PROTEIN: 10.4 G/DL (ref 6.4–8.3)
TROPONIN INTERP: NORMAL
TROPONIN T: NORMAL NG/ML
TROPONIN, HIGH SENSITIVITY: 9 NG/L (ref 0–14)
WBC # BLD: 7 K/UL (ref 3.5–11.3)
WBC # BLD: ABNORMAL 10*3/UL

## 2021-04-03 PROCEDURE — 96374 THER/PROPH/DIAG INJ IV PUSH: CPT

## 2021-04-03 PROCEDURE — 85055 RETICULATED PLATELET ASSAY: CPT

## 2021-04-03 PROCEDURE — 6360000002 HC RX W HCPCS: Performed by: STUDENT IN AN ORGANIZED HEALTH CARE EDUCATION/TRAINING PROGRAM

## 2021-04-03 PROCEDURE — 99283 EMERGENCY DEPT VISIT LOW MDM: CPT

## 2021-04-03 PROCEDURE — 80053 COMPREHEN METABOLIC PANEL: CPT

## 2021-04-03 PROCEDURE — 6360000004 HC RX CONTRAST MEDICATION: Performed by: EMERGENCY MEDICINE

## 2021-04-03 PROCEDURE — 84484 ASSAY OF TROPONIN QUANT: CPT

## 2021-04-03 PROCEDURE — 83690 ASSAY OF LIPASE: CPT

## 2021-04-03 PROCEDURE — 96375 TX/PRO/DX INJ NEW DRUG ADDON: CPT

## 2021-04-03 PROCEDURE — 83880 ASSAY OF NATRIURETIC PEPTIDE: CPT

## 2021-04-03 PROCEDURE — 85025 COMPLETE CBC W/AUTO DIFF WBC: CPT

## 2021-04-03 PROCEDURE — 74177 CT ABD & PELVIS W/CONTRAST: CPT

## 2021-04-03 PROCEDURE — 93005 ELECTROCARDIOGRAM TRACING: CPT | Performed by: STUDENT IN AN ORGANIZED HEALTH CARE EDUCATION/TRAINING PROGRAM

## 2021-04-03 RX ORDER — KETOROLAC TROMETHAMINE 15 MG/ML
15 INJECTION, SOLUTION INTRAMUSCULAR; INTRAVENOUS ONCE
Status: COMPLETED | OUTPATIENT
Start: 2021-04-04 | End: 2021-04-04

## 2021-04-03 RX ORDER — ONDANSETRON 2 MG/ML
4 INJECTION INTRAMUSCULAR; INTRAVENOUS ONCE
Status: COMPLETED | OUTPATIENT
Start: 2021-04-04 | End: 2021-04-04

## 2021-04-03 RX ORDER — MORPHINE SULFATE 4 MG/ML
2 INJECTION, SOLUTION INTRAMUSCULAR; INTRAVENOUS ONCE
Status: COMPLETED | OUTPATIENT
Start: 2021-04-03 | End: 2021-04-03

## 2021-04-03 RX ORDER — ONDANSETRON 2 MG/ML
4 INJECTION INTRAMUSCULAR; INTRAVENOUS ONCE
Status: COMPLETED | OUTPATIENT
Start: 2021-04-03 | End: 2021-04-03

## 2021-04-03 RX ADMIN — IOPAMIDOL 75 ML: 755 INJECTION, SOLUTION INTRAVENOUS at 23:05

## 2021-04-03 RX ADMIN — MORPHINE SULFATE 2 MG: 4 INJECTION INTRAVENOUS at 20:47

## 2021-04-03 RX ADMIN — ONDANSETRON 4 MG: 2 INJECTION INTRAMUSCULAR; INTRAVENOUS at 20:48

## 2021-04-03 ASSESSMENT — ENCOUNTER SYMPTOMS
ABDOMINAL DISTENTION: 0
WHEEZING: 0
ABDOMINAL PAIN: 1
NAUSEA: 1
VOMITING: 1
DIARRHEA: 0
PHOTOPHOBIA: 0
SORE THROAT: 0
BACK PAIN: 0
SHORTNESS OF BREATH: 0
RHINORRHEA: 0
CHEST TIGHTNESS: 0
CONSTIPATION: 0
TROUBLE SWALLOWING: 0

## 2021-04-03 ASSESSMENT — PAIN DESCRIPTION - FREQUENCY: FREQUENCY: CONTINUOUS

## 2021-04-03 ASSESSMENT — PAIN SCALES - GENERAL: PAINLEVEL_OUTOF10: 8

## 2021-04-03 ASSESSMENT — PAIN DESCRIPTION - ORIENTATION: ORIENTATION: RIGHT

## 2021-04-03 ASSESSMENT — PAIN DESCRIPTION - LOCATION: LOCATION: FLANK

## 2021-04-03 NOTE — ED PROVIDER NOTES
[facility]  Emergency Department  Faculty Attestation     I performed a history and physical examination of the patient and discussed management with the resident. I reviewed the residents note and agree with the documented findings and plan of care. Any areas of disagreement are noted on the chart. I was personally present for the key portions of any procedures. I have documented in the chart those procedures where I was not present during the key portions. I have reviewed the emergency nurses triage note. I agree with the chief complaint, past medical history, past surgical history, allergies, medications, social and family history as documented unless otherwise noted below. For Physician Assistant/ Nurse Practitioner cases/documentation I have personally evaluated this patient and have completed at least one if not all key elements of the E/M (history, physical exam, and MDM). Additional findings are as noted. Primary Care Physician:  No primary care provider on file.     Screenings:  [unfilled]    CHIEF COMPLAINT       Chief Complaint   Patient presents with    Flank Pain     right side        RECENT VITALS:   Temp: 98.5 °F (36.9 °C),  Pulse: 90, Resp: 16,      LABS:  Labs Reviewed   CBC WITH AUTO DIFFERENTIAL - Abnormal; Notable for the following components:       Result Value    RBC 5.37 (*)     Hemoglobin 17.5 (*)     Hematocrit 52.2 (*)     RDW 14.5 (*)     Seg Neutrophils 66 (*)     Immature Granulocytes 1 (*)     All other components within normal limits   COMPREHENSIVE METABOLIC PANEL   TROPONIN   URINE RT REFLEX TO CULTURE   LIPASE   BRAIN NATRIURETIC PEPTIDE   IMMATURE PLATELET FRACTION       Radiology  CT ABDOMEN PELVIS W IV CONTRAST Additional Contrast? None    (Results Pending)           EKG:   EKG Interpretation    Interpreted by me    Rhythm: normal sinus   Rate: normal  Axis: normal  Ectopy: none  Conduction: Long QTc  ST Segments: Subtle depression inferolaterally  T Waves: Inversion aVL  Q Waves: none    Clinical Impression: Nonspecific    Attending Physician Additional  Notes    Patient has right upper quad abdominal pain rating to the right flank with nausea vomiting fevers and chills. Temperature not documented but she has sweats. No difficulty breathing or chest pain. No blood in her emesis. She has had urinary frequency but no dysuria. She has had a history of prior UTIs. No known history of gallstones. No lower abdominal pain. Patient also request to be admitted since she has no place to stay. She has been living with her brother and does not feel she can go back to his place tonight. On exam she is thin, borderline tachycardic, afebrile, anicteric. Lungs are clear. No CVA tenderness. There is moderate upper quadrant tenderness without Tompkins sign. No lower abdominal tenderness negative McBurney's point tenderness and Rovsing sign. Impression is abdominal pain, fevers and vomiting, consider biliary colic, cholecystitis, pyelonephritis. Plan is IV fluids, labs, urinalysis and culture, antiemetics, CT imaging, reassess. Anticipate admission. Gilford Reap. Mercer Mcmurray, MD, [facility]  Attending Emergency  Physician               Zena Vickers MD  04/03/21 [id number]

## 2021-04-03 NOTE — ED PROVIDER NOTES
[facility]  ED  Emergency Department Encounter  Emergency Medicine Resident     Pt Name: Debbie Jones  MRN: [id number]  Adonaygfsurinder 1955  Date of evaluation: 4/3/21  PCP:  No primary care provider on file. CHIEF COMPLAINT       Chief Complaint   Patient presents with    Flank Pain     right side        HISTORY OFPRESENT ILLNESS  (Location/Symptom, Timing/Onset, Context/Setting, Quality, Duration, Modifying Factors,Severity.)      Emi Daniels is a 77 y.o. female with history of thrombocytosis, hepatitis C, substance abuse, peptic ulcer disease, pancreas lesion, COPD, stroke, CRYSTAL who presents with right-sided flank pain. Patient states that pain has been persistent over the past 2 days but has had some worsening over the past 30 minutes prior to arrival, has had nausea with vomiting with the symptoms, but the pain is 8 out of intensity, states that the right upper quadrant rating to the back. Denies exacerbating or relieving factors, states that she has a history of pain similar to this before in the past.  Patient does have a history of multiple abdominal surgeries including , appendectomy, patient is still passing flatus, having bowel movements, has endorsed polyuria.   Plan is also to monitor his respiratory home with similar symptoms    PAST MEDICAL / SURGICAL / SOCIAL / FAMILY HISTORY      has a past medical history of Appendicitis, Cerebral artery occlusion with cerebral infarction ([address] CHF (congestive heart failure) (HCC), Chronic respiratory failure with hypoxia and hypercapnia (HCC), Chronic rhinitis, Cigarette smoker, COPD (chronic obstructive pulmonary disease) ([address] Depression, Dysphagia, Essential hypertension, GERD (gastroesophageal reflux disease), Heart failure, diastolic, with acute decompensation ([address] Hepatitis C, chronic ([address] History of smoking at least 1 pack per day for at least 30 years, Hyperlipidemia, Hypertension, Migraine, Moderate COPD (chronic tablet by mouth nightly 1/31/20   Umesh Jacobo DO   clopidogrel (PLAVIX) 75 MG tablet Take 1 tablet by mouth daily 2/1/20   Umesh Jacobo DO   Misc. Devices [facility]) MISC 1 Device by Does not apply route once for 1 dose 8/5/19 8/5/19  Velvet Holm PA-C   carvedilol (COREG) 6.25 MG tablet Take 1 tablet by mouth 2 times daily (with meals) 2/7/19   Johanna Perry MD   ferrous sulfate (MELISSA-HAO) 325 (65 Fe) MG tablet Take 1 tablet by mouth daily 2/7/19   Johanna Perry MD   melatonin 1 MG tablet Take 1 tablet by mouth nightly as needed for Sleep 1/11/19   Forrest Santos MD   ipratropium-albuterol (DUONEB) 0.5-2.5 (3) MG/3ML SOLN nebulizer solution Inhale 3 mLs into the lungs every 4 hours as needed for Shortness of Breath 1/11/19   Forrest Santos MD   mometasone-formoterol [facility]) 100-5 MCG/ACT inhaler Inhale 2 puffs into the lungs 2 times daily 12/5/18   Brian Bradley MD   pantoprazole (PROTONIX) 40 MG tablet Take 1 tablet by mouth 2 times daily (before meals)  Patient not taking: Reported on 12/8/2020 11/13/18   Negro Orlando MD   folic acid (FOLVITE) 1 MG tablet Take 1 tablet by mouth daily 10/13/18   Khadijah Zamudio MD   vitamin B-1 100 MG tablet Take 1 tablet by mouth daily 10/13/18   Khadijah Zamudio MD   vitamin B-12 250 MCG tablet Take 1 tablet by mouth daily 10/13/18   Khadijah Zamudio MD   Multiple Vitamins-Minerals (THERAPEUTIC MULTIVITAMIN-MINERALS) tablet Take 1 tablet by mouth daily 8/12/18   Hyman Apley, MD       REVIEW OFSYSTEMS    (2-9 systems for level 4, 10 or more for level 5)      Review of Systems   Constitutional: Positive for fatigue. Negative for chills and fever. HENT: Negative for hearing loss, rhinorrhea, sneezing, sore throat, tinnitus and trouble swallowing. Eyes: Negative for photophobia and visual disturbance. Respiratory: Negative for chest tightness, shortness of breath and wheezing. Cardiovascular: Negative for chest pain and palpitations. Mental status is at baseline. DIFFERENTIAL  DIAGNOSIS     PLAN (LABS / IMAGING / EKG):  Orders Placed This Encounter   Procedures    Culture, Urine    CT ABDOMEN PELVIS W IV CONTRAST Additional Contrast? None    CBC WITH AUTO DIFFERENTIAL    COMPREHENSIVE METABOLIC PANEL    Troponin    Urinalysis Reflex to Culture    LIPASE    Brain Natriuretic Peptide    Immature Platelet Fraction    Microscopic Urinalysis    Inpatient consult to Hospitalist    EKG 12 Lead    PATIENT STATUS (FROM ED OR OR/PROCEDURAL) Inpatient       MEDICATIONS ORDERED:  Orders Placed This Encounter   Medications    ondansetron (ZOFRAN) injection 4 mg    morphine injection 2 mg    iopamidol (ISOVUE-370) 76 % injection 75 mL    ketorolac (TORADOL) injection 15 mg    ondansetron (ZOFRAN) injection 4 mg    cefTRIAXone (ROCEPHIN) 1000 mg IVPB in 50 mL D5W minibag     Order Specific Question:   Antimicrobial Indications     Answer:   Urinary Tract Infection       DDX: Gastroenteritis, gastritis, transaminitis, cholelithiasis, cholecystitis, urinary tract infection, pyelonephritis, hepatitis, transaminitis    Initial MDM/Plan/ED COURSE:    77 y.o. female who presents with a history of hepatitis C comes in with right upper quadrant abdominal pain rating to the flank. Abdomen is soft however does have lateral tenderness to the right upper quadrant of the belly. Patient has nausea vomiting but still passing gas, having bowel movements. Plan to assess for transaminitis, elevated alkaline phosphatase as well as electrolyte abnormality with a CMP, plan to assess her elevated white count and anemia with a CBC, and treat patient pain and nausea with Zofran, morphine. Pending a CT scan secondary to patient's nausea, vomiting, overt tenderness and concern for comorbidity based on patient's age.   Plan to get a singular troponin as well as an EKG in order assess for acute coronary syndrome with atypical presentation in a 77year-old female with positive risk factors. ED Course as of Apr 04 0103   Sat Apr 03, 2021   1943 Normal sinus, normal axis, ventricular rate of 93, SD interval 144, QRS 68, QTc 492, left atrial lead revision lead II, prolonged QT, ST depression in V6, consistent with previous EKG, T wave inversions noted in aVL, nonspecific EKG    [GP]   Sun Apr 04, 2021   0004 UA is pending this time, patient still having nausea, vomiting, persistent right upper quadrant abdominal pain, CT scan shows ascending colitis, gastritis, concerns for pancreatic mass. Plan to admit patient for symptomatic management of gastritis, colitis as well as for further evaluation of pancreatic mass. [GP]   0014 Velocity, ALT, AST elevated from patient's previous baseline, with concerns for pancreatic mass plan admit patient for likely MRCP in the morning.    [GP]      ED Course User Index  [GP] Evelyn Peck MD   Patient's pain was treated with morphine, Zofran, concerns for pancreatic mass, gastritis, colitis based on CT scan. Patient agreeable to admission at this time.   Patient admitted to [facility]:     DIAGNOSTIC RESULTS / EMERGENCYDEPARTMENT COURSE / MDM     LABS:  Labs Reviewed   CBC WITH AUTO DIFFERENTIAL - Abnormal; Notable for the following components:       Result Value    RBC 5.37 (*)     Hemoglobin 17.5 (*)     Hematocrit 52.2 (*)     RDW 14.5 (*)     Seg Neutrophils 66 (*)     Immature Granulocytes 1 (*)     All other components within normal limits   COMPREHENSIVE METABOLIC PANEL - Abnormal; Notable for the following components:    Calcium 10.6 (*)     Sodium 133 (*)     Chloride 96 (*)     CO2 18 (*)     Anion Gap 19 (*)     Alkaline Phosphatase 111 (*)     ALT 51 (*)     AST 66 (*)     Total Protein 10.4 (*)     All other components within normal limits   URINE RT REFLEX TO CULTURE - Abnormal; Notable for the following components:    Color, UA DARK YELLOW (*)     Ketones, Urine TRACE (*)     Specific Gravity, UA 1.062 (*) Protein, UA 1+ (*)     Leukocyte Esterase, Urine MODERATE (*)     All other components within normal limits   BRAIN NATRIURETIC PEPTIDE - Abnormal; Notable for the following components:    Pro- (*)     All other components within normal limits   MICROSCOPIC URINALYSIS - Abnormal; Notable for the following components:    Mucus, UA 1+ (*)     All other components within normal limits   CULTURE, URINE   TROPONIN   LIPASE   IMMATURE PLATELET FRACTION           Ct Abdomen Pelvis W Iv Contrast Additional Contrast? None    Result Date: 4/3/2021  EXAMINATION: CT OF THE ABDOMEN AND PELVIS WITH CONTRAST 4/3/2021 10:29 pm TECHNIQUE: CT of the abdomen and pelvis was performed with the administration of intravenous contrast. Multiplanar reformatted images are provided for review. Dose modulation, iterative reconstruction, and/or weight based adjustment of the mA/kV was utilized to reduce the radiation dose to as low as reasonably achievable. COMPARISON: 10/20/2020 HISTORY: ORDERING SYSTEM PROVIDED HISTORY: right upper quadrant pain TECHNOLOGIST PROVIDED HISTORY: right upper quadrant pain Decision Support Exception->Emergency Medical Condition (MA) Reason for Exam: right upper quadrant pain Acuity: Acute Type of Exam: Initial FINDINGS: Lower Chest: The visualized part of lung bases are clear. Organs: 2.3 x 1.9 cm peripherally enhancing lesion appears to arise from the mid body of the pancreas. The peripheral aspect of this lesion has a more solid appearance in comparison to prior exam.  There is also associated mild dilatation of pancreatic duct. 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 There is unchanged focal area of fatty infiltration involving the anterior aspect of the liver near ligamentum teres. The liver, spleen, adrenal glands, gallbladder,  kidneys and ureters and pelvic organs including the urinary bladder appear unremarkable.  GI/Bowel:Apparent hyperenhancement of the mucosa of the stomach. There has been interval mild thickening of the wall of the ascending colon which was not present on prior examination. The finding may be suggestive of infectious/inflammatory colitis. Peritoneum/Retroperitoneum: No free air or free fluid is noted. No pathologically enlarged lymphadenopathy. The vasculature do not demonstrate acute abnormality. Bones/Soft Tissues: Mild levoscoliosis with tip at L3-L4. Severe degenerative changes of left hip joint. status post right hip arthroplasty. Mild disc degenerative disease at L4-L5. Thickening of the wall of the ascending colon with mild surrounding fatty stranding is likely suggestive of infectious/inflammatory colitis. Apparent hyperenhancement of the mucosa of the stomach. Finding is nonspecific but may be suggestive of gastritis. 2.3 x 1.9 cm peripherally enhancing lesion appears to arise from the mid body of the pancreas. The peripheral aspect of this lesion has a more solid appearance in comparison to prior exam. There is also associated mild dilatation of pancreatic duct. 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 For further evaluation of this finding contrast enhance MRCP examination is recommended. Severe degenerative changes of the left hip joint with bone-on-bone appearance. EKG  Normal sinus, normal axis, ventricular rate of 93, AK interval 144, QRS 68, QTc 492, left atrial lead revision lead II, prolonged QT, ST depression in V6, consistent with previous EKG, T wave inversions noted in aVL, nonspecific EKG       PROCEDURES:  None    CONSULTS:  IP CONSULT TO HOSPITALIST    CRITICAL CARE:  Please see attending note    FINAL IMPRESSION      1. Right flank pain    2. Acute gastritis without hemorrhage, unspecified gastritis type    3. Colitis    4.  Pancreatic mass          DISPOSITION / PLAN     DISPOSITION Admitted 04/04/2021 12:42:33 AM      PATIENT REFERRED TO:  No follow-up provider

## 2021-04-04 PROBLEM — K86.89 PANCREATIC MASS: Status: ACTIVE | Noted: 2021-04-04

## 2021-04-04 PROBLEM — K52.9 COLITIS: Status: ACTIVE | Noted: 2021-04-04

## 2021-04-04 PROBLEM — Z59.00 HOMELESSNESS: Status: ACTIVE | Noted: 2021-04-04

## 2021-04-04 LAB
-: ABNORMAL
ABSOLUTE EOS #: 0.04 K/UL (ref 0–0.44)
ABSOLUTE IMMATURE GRANULOCYTE: <0.03 K/UL (ref 0–0.3)
ABSOLUTE LYMPH #: 1.48 K/UL (ref 1.1–3.7)
ABSOLUTE MONO #: 0.63 K/UL (ref 0.1–1.2)
ALBUMIN SERPL-MCNC: 3.6 G/DL (ref 3.5–5.2)
ALBUMIN/GLOBULIN RATIO: 1.2 (ref 1–2.5)
ALP BLD-CCNC: 80 U/L (ref 35–104)
ALT SERPL-CCNC: 61 U/L (ref 5–33)
AMORPHOUS: ABNORMAL
ANION GAP SERPL CALCULATED.3IONS-SCNC: 12 MMOL/L (ref 9–17)
AST SERPL-CCNC: 92 U/L
BACTERIA: ABNORMAL
BASOPHILS # BLD: 0 % (ref 0–2)
BASOPHILS ABSOLUTE: <0.03 K/UL (ref 0–0.2)
BILIRUB SERPL-MCNC: 1.26 MG/DL (ref 0.3–1.2)
BILIRUBIN URINE: NEGATIVE
BUN BLDV-MCNC: 20 MG/DL (ref 8–23)
BUN/CREAT BLD: ABNORMAL (ref 9–20)
CALCIUM SERPL-MCNC: 8.4 MG/DL (ref 8.6–10.4)
CASTS UA: ABNORMAL /LPF (ref 0–2)
CASTS UA: ABNORMAL /LPF (ref 0–2)
CHLORIDE BLD-SCNC: 106 MMOL/L (ref 98–107)
CO2: 19 MMOL/L (ref 20–31)
COLOR: ABNORMAL
COMMENT UA: ABNORMAL
CREAT SERPL-MCNC: 0.64 MG/DL (ref 0.5–0.9)
CRYSTALS, UA: ABNORMAL /HPF
DIFFERENTIAL TYPE: ABNORMAL
EOSINOPHILS RELATIVE PERCENT: 1 % (ref 1–4)
EPITHELIAL CELLS UA: ABNORMAL /HPF (ref 0–5)
GFR AFRICAN AMERICAN: >60 ML/MIN
GFR NON-AFRICAN AMERICAN: >60 ML/MIN
GFR SERPL CREATININE-BSD FRML MDRD: ABNORMAL ML/MIN/{1.73_M2}
GFR SERPL CREATININE-BSD FRML MDRD: ABNORMAL ML/MIN/{1.73_M2}
GLUCOSE BLD-MCNC: 82 MG/DL (ref 70–99)
GLUCOSE URINE: NEGATIVE
HCT VFR BLD CALC: 35.5 % (ref 36.3–47.1)
HEMOGLOBIN: 11.9 G/DL (ref 11.9–15.1)
IMMATURE GRANULOCYTES: 0 %
KETONES, URINE: ABNORMAL
LEUKOCYTE ESTERASE, URINE: ABNORMAL
LYMPHOCYTES # BLD: 32 % (ref 24–43)
MAGNESIUM: 2.1 MG/DL (ref 1.6–2.6)
MCH RBC QN AUTO: 32.6 PG (ref 25.2–33.5)
MCHC RBC AUTO-ENTMCNC: 33.5 G/DL (ref 28.4–34.8)
MCV RBC AUTO: 97.3 FL (ref 82.6–102.9)
MONOCYTES # BLD: 14 % (ref 3–12)
MUCUS: ABNORMAL
NITRITE, URINE: NEGATIVE
NRBC AUTOMATED: 0 PER 100 WBC
OTHER OBSERVATIONS UA: ABNORMAL
PDW BLD-RTO: 14.2 % (ref 11.8–14.4)
PH UA: 7 (ref 5–8)
PLATELET # BLD: 410 K/UL (ref 138–453)
PLATELET ESTIMATE: ABNORMAL
PMV BLD AUTO: 9.2 FL (ref 8.1–13.5)
POTASSIUM SERPL-SCNC: 3.3 MMOL/L (ref 3.7–5.3)
PROTEIN UA: ABNORMAL
RBC # BLD: 3.65 M/UL (ref 3.95–5.11)
RBC # BLD: ABNORMAL 10*6/UL
RBC UA: ABNORMAL /HPF (ref 0–2)
RENAL EPITHELIAL, UA: ABNORMAL /HPF
SARS-COV-2: NORMAL
SARS-COV-2: NOT DETECTED
SEG NEUTROPHILS: 52 % (ref 36–65)
SEGMENTED NEUTROPHILS ABSOLUTE COUNT: 2.39 K/UL (ref 1.5–8.1)
SODIUM BLD-SCNC: 137 MMOL/L (ref 135–144)
SOURCE: NORMAL
SPECIFIC GRAVITY UA: 1.06 (ref 1–1.03)
TOTAL PROTEIN: 6.5 G/DL (ref 6.4–8.3)
TRICHOMONAS: ABNORMAL
TURBIDITY: CLEAR
URINE HGB: NEGATIVE
UROBILINOGEN, URINE: NORMAL
WBC # BLD: 4.6 K/UL (ref 3.5–11.3)
WBC # BLD: ABNORMAL 10*3/UL
WBC UA: ABNORMAL /HPF (ref 0–5)
YEAST: ABNORMAL

## 2021-04-04 PROCEDURE — 85025 COMPLETE CBC W/AUTO DIFF WBC: CPT

## 2021-04-04 PROCEDURE — 97530 THERAPEUTIC ACTIVITIES: CPT

## 2021-04-04 PROCEDURE — 99222 1ST HOSP IP/OBS MODERATE 55: CPT | Performed by: INTERNAL MEDICINE

## 2021-04-04 PROCEDURE — 6360000002 HC RX W HCPCS: Performed by: NURSE PRACTITIONER

## 2021-04-04 PROCEDURE — 2500000003 HC RX 250 WO HCPCS: Performed by: NURSE PRACTITIONER

## 2021-04-04 PROCEDURE — U0003 INFECTIOUS AGENT DETECTION BY NUCLEIC ACID (DNA OR RNA); SEVERE ACUTE RESPIRATORY SYNDROME CORONAVIRUS 2 (SARS-COV-2) (CORONAVIRUS DISEASE [COVID-19]), AMPLIFIED PROBE TECHNIQUE, MAKING USE OF HIGH THROUGHPUT TECHNOLOGIES AS DESCRIBED BY CMS-2020-01-R: HCPCS

## 2021-04-04 PROCEDURE — 81001 URINALYSIS AUTO W/SCOPE: CPT

## 2021-04-04 PROCEDURE — 2580000003 HC RX 258: Performed by: STUDENT IN AN ORGANIZED HEALTH CARE EDUCATION/TRAINING PROGRAM

## 2021-04-04 PROCEDURE — 80053 COMPREHEN METABOLIC PANEL: CPT

## 2021-04-04 PROCEDURE — 2580000003 HC RX 258: Performed by: NURSE PRACTITIONER

## 2021-04-04 PROCEDURE — 96376 TX/PRO/DX INJ SAME DRUG ADON: CPT

## 2021-04-04 PROCEDURE — 1200000000 HC SEMI PRIVATE

## 2021-04-04 PROCEDURE — U0005 INFEC AGEN DETEC AMPLI PROBE: HCPCS

## 2021-04-04 PROCEDURE — 97166 OT EVAL MOD COMPLEX 45 MIN: CPT

## 2021-04-04 PROCEDURE — APPSS45 APP SPLIT SHARED TIME 31-45 MINUTES: Performed by: NURSE PRACTITIONER

## 2021-04-04 PROCEDURE — 96375 TX/PRO/DX INJ NEW DRUG ADDON: CPT

## 2021-04-04 PROCEDURE — 87086 URINE CULTURE/COLONY COUNT: CPT

## 2021-04-04 PROCEDURE — 6360000002 HC RX W HCPCS: Performed by: STUDENT IN AN ORGANIZED HEALTH CARE EDUCATION/TRAINING PROGRAM

## 2021-04-04 PROCEDURE — 36415 COLL VENOUS BLD VENIPUNCTURE: CPT

## 2021-04-04 PROCEDURE — 99253 IP/OBS CNSLTJ NEW/EST LOW 45: CPT | Performed by: INTERNAL MEDICINE

## 2021-04-04 PROCEDURE — 83735 ASSAY OF MAGNESIUM: CPT

## 2021-04-04 PROCEDURE — 97161 PT EVAL LOW COMPLEX 20 MIN: CPT

## 2021-04-04 PROCEDURE — 2580000003 HC RX 258: Performed by: INTERNAL MEDICINE

## 2021-04-04 RX ORDER — CIPROFLOXACIN 2 MG/ML
400 INJECTION, SOLUTION INTRAVENOUS EVERY 12 HOURS
Status: DISCONTINUED | OUTPATIENT
Start: 2021-04-04 | End: 2021-04-05

## 2021-04-04 RX ORDER — MAGNESIUM SULFATE 1 G/100ML
1000 INJECTION INTRAVENOUS PRN
Status: DISCONTINUED | OUTPATIENT
Start: 2021-04-04 | End: 2021-04-06 | Stop reason: HOSPADM

## 2021-04-04 RX ORDER — LISINOPRIL 10 MG/1
10 TABLET ORAL DAILY
Status: DISCONTINUED | OUTPATIENT
Start: 2021-04-04 | End: 2021-04-06 | Stop reason: HOSPADM

## 2021-04-04 RX ORDER — ACETAMINOPHEN 325 MG/1
650 TABLET ORAL EVERY 4 HOURS PRN
Status: DISCONTINUED | OUTPATIENT
Start: 2021-04-04 | End: 2021-04-06 | Stop reason: HOSPADM

## 2021-04-04 RX ORDER — HYDROCODONE BITARTRATE AND ACETAMINOPHEN 5; 325 MG/1; MG/1
2 TABLET ORAL EVERY 4 HOURS PRN
Status: DISCONTINUED | OUTPATIENT
Start: 2021-04-04 | End: 2021-04-06 | Stop reason: HOSPADM

## 2021-04-04 RX ORDER — UREA 10 %
1 LOTION (ML) TOPICAL NIGHTLY PRN
Status: DISCONTINUED | OUTPATIENT
Start: 2021-04-04 | End: 2021-04-06 | Stop reason: HOSPADM

## 2021-04-04 RX ORDER — ATORVASTATIN CALCIUM 40 MG/1
40 TABLET, FILM COATED ORAL NIGHTLY
Status: DISCONTINUED | OUTPATIENT
Start: 2021-04-04 | End: 2021-04-06 | Stop reason: HOSPADM

## 2021-04-04 RX ORDER — HYDROCODONE BITARTRATE AND ACETAMINOPHEN 5; 325 MG/1; MG/1
1 TABLET ORAL EVERY 4 HOURS PRN
Status: DISCONTINUED | OUTPATIENT
Start: 2021-04-04 | End: 2021-04-06 | Stop reason: HOSPADM

## 2021-04-04 RX ORDER — SODIUM CHLORIDE, SODIUM LACTATE, POTASSIUM CHLORIDE, CALCIUM CHLORIDE 600; 310; 30; 20 MG/100ML; MG/100ML; MG/100ML; MG/100ML
INJECTION, SOLUTION INTRAVENOUS CONTINUOUS
Status: DISCONTINUED | OUTPATIENT
Start: 2021-04-04 | End: 2021-04-05

## 2021-04-04 RX ORDER — SODIUM CHLORIDE 0.9 % (FLUSH) 0.9 %
10 SYRINGE (ML) INJECTION EVERY 12 HOURS SCHEDULED
Status: DISCONTINUED | OUTPATIENT
Start: 2021-04-04 | End: 2021-04-05

## 2021-04-04 RX ORDER — LANOLIN ALCOHOL/MO/W.PET/CERES
325 CREAM (GRAM) TOPICAL DAILY
Status: DISCONTINUED | OUTPATIENT
Start: 2021-04-04 | End: 2021-04-06 | Stop reason: HOSPADM

## 2021-04-04 RX ORDER — GABAPENTIN 300 MG/1
300 CAPSULE ORAL 3 TIMES DAILY
Status: DISCONTINUED | OUTPATIENT
Start: 2021-04-04 | End: 2021-04-06 | Stop reason: HOSPADM

## 2021-04-04 RX ORDER — SODIUM CHLORIDE 9 MG/ML
INJECTION, SOLUTION INTRAVENOUS CONTINUOUS
Status: DISCONTINUED | OUTPATIENT
Start: 2021-04-04 | End: 2021-04-04

## 2021-04-04 RX ORDER — SODIUM CHLORIDE 0.9 % (FLUSH) 0.9 %
10 SYRINGE (ML) INJECTION PRN
Status: DISCONTINUED | OUTPATIENT
Start: 2021-04-04 | End: 2021-04-06 | Stop reason: HOSPADM

## 2021-04-04 RX ORDER — SODIUM CHLORIDE 9 MG/ML
25 INJECTION, SOLUTION INTRAVENOUS PRN
Status: DISCONTINUED | OUTPATIENT
Start: 2021-04-04 | End: 2021-04-06 | Stop reason: HOSPADM

## 2021-04-04 RX ORDER — AMITRIPTYLINE HYDROCHLORIDE 10 MG/1
10 TABLET, FILM COATED ORAL NIGHTLY
Status: DISCONTINUED | OUTPATIENT
Start: 2021-04-04 | End: 2021-04-06 | Stop reason: HOSPADM

## 2021-04-04 RX ORDER — LABETALOL HYDROCHLORIDE 5 MG/ML
20 INJECTION, SOLUTION INTRAVENOUS
Status: DISCONTINUED | OUTPATIENT
Start: 2021-04-04 | End: 2021-04-06 | Stop reason: HOSPADM

## 2021-04-04 RX ORDER — POTASSIUM CHLORIDE 7.45 MG/ML
10 INJECTION INTRAVENOUS PRN
Status: DISCONTINUED | OUTPATIENT
Start: 2021-04-04 | End: 2021-04-06 | Stop reason: HOSPADM

## 2021-04-04 RX ORDER — CLOPIDOGREL BISULFATE 75 MG/1
75 TABLET ORAL DAILY
Status: DISCONTINUED | OUTPATIENT
Start: 2021-04-04 | End: 2021-04-06 | Stop reason: HOSPADM

## 2021-04-04 RX ORDER — FOLIC ACID 1 MG/1
1 TABLET ORAL DAILY
Status: DISCONTINUED | OUTPATIENT
Start: 2021-04-04 | End: 2021-04-06 | Stop reason: HOSPADM

## 2021-04-04 RX ORDER — ONDANSETRON 2 MG/ML
4 INJECTION INTRAMUSCULAR; INTRAVENOUS EVERY 6 HOURS PRN
Status: DISCONTINUED | OUTPATIENT
Start: 2021-04-04 | End: 2021-04-06 | Stop reason: HOSPADM

## 2021-04-04 RX ORDER — PROMETHAZINE HYDROCHLORIDE 12.5 MG/1
12.5 TABLET ORAL EVERY 6 HOURS PRN
Status: DISCONTINUED | OUTPATIENT
Start: 2021-04-04 | End: 2021-04-06 | Stop reason: HOSPADM

## 2021-04-04 RX ORDER — CARVEDILOL 6.25 MG/1
6.25 TABLET ORAL 2 TIMES DAILY WITH MEALS
Status: DISCONTINUED | OUTPATIENT
Start: 2021-04-04 | End: 2021-04-06 | Stop reason: HOSPADM

## 2021-04-04 RX ADMIN — HYDROMORPHONE HYDROCHLORIDE 0.5 MG: 1 INJECTION, SOLUTION INTRAMUSCULAR; INTRAVENOUS; SUBCUTANEOUS at 03:03

## 2021-04-04 RX ADMIN — CIPROFLOXACIN 400 MG: 2 INJECTION, SOLUTION INTRAVENOUS at 03:07

## 2021-04-04 RX ADMIN — SODIUM CHLORIDE: 9 INJECTION, SOLUTION INTRAVENOUS at 02:32

## 2021-04-04 RX ADMIN — Medication 20 MG: at 08:34

## 2021-04-04 RX ADMIN — FAMOTIDINE 20 MG: 10 INJECTION INTRAVENOUS at 20:11

## 2021-04-04 RX ADMIN — HYDROMORPHONE HYDROCHLORIDE 0.5 MG: 1 INJECTION, SOLUTION INTRAMUSCULAR; INTRAVENOUS; SUBCUTANEOUS at 20:10

## 2021-04-04 RX ADMIN — FAMOTIDINE 20 MG: 10 INJECTION INTRAVENOUS at 08:27

## 2021-04-04 RX ADMIN — SODIUM CHLORIDE, PRESERVATIVE FREE 10 ML: 5 INJECTION INTRAVENOUS at 20:11

## 2021-04-04 RX ADMIN — HYDROMORPHONE HYDROCHLORIDE 0.5 MG: 1 INJECTION, SOLUTION INTRAMUSCULAR; INTRAVENOUS; SUBCUTANEOUS at 13:08

## 2021-04-04 RX ADMIN — HYDROMORPHONE HYDROCHLORIDE 0.5 MG: 1 INJECTION, SOLUTION INTRAMUSCULAR; INTRAVENOUS; SUBCUTANEOUS at 08:34

## 2021-04-04 RX ADMIN — ONDANSETRON 4 MG: 2 INJECTION INTRAMUSCULAR; INTRAVENOUS at 00:01

## 2021-04-04 RX ADMIN — KETOROLAC TROMETHAMINE 15 MG: 15 INJECTION, SOLUTION INTRAMUSCULAR; INTRAVENOUS at 00:01

## 2021-04-04 RX ADMIN — ENOXAPARIN SODIUM 30 MG: 30 INJECTION SUBCUTANEOUS at 08:27

## 2021-04-04 RX ADMIN — HYDROMORPHONE HYDROCHLORIDE 0.5 MG: 1 INJECTION, SOLUTION INTRAMUSCULAR; INTRAVENOUS; SUBCUTANEOUS at 17:10

## 2021-04-04 RX ADMIN — Medication 20 MG: at 03:06

## 2021-04-04 RX ADMIN — CIPROFLOXACIN 400 MG: 2 INJECTION, SOLUTION INTRAVENOUS at 14:14

## 2021-04-04 RX ADMIN — SODIUM CHLORIDE, POTASSIUM CHLORIDE, SODIUM LACTATE AND CALCIUM CHLORIDE: 600; 310; 30; 20 INJECTION, SOLUTION INTRAVENOUS at 18:58

## 2021-04-04 RX ADMIN — CEFTRIAXONE SODIUM 1000 MG: 1 INJECTION, POWDER, FOR SOLUTION INTRAMUSCULAR; INTRAVENOUS at 01:33

## 2021-04-04 RX ADMIN — FAMOTIDINE 20 MG: 10 INJECTION INTRAVENOUS at 03:06

## 2021-04-04 ASSESSMENT — PAIN SCALES - GENERAL
PAINLEVEL_OUTOF10: 8
PAINLEVEL_OUTOF10: 7
PAINLEVEL_OUTOF10: 7
PAINLEVEL_OUTOF10: 8
PAINLEVEL_OUTOF10: 7

## 2021-04-04 ASSESSMENT — PAIN DESCRIPTION - LOCATION: LOCATION: BACK;HEAD

## 2021-04-04 NOTE — ED PROVIDER NOTES
Faculty Sign-Out Attestation  Handoff taken on the following patient from prior Attending Physician: Philip Montero    I was available and discussed any additional care issues that arose and coordinated the management plans with the resident(s) caring for the patient during my duty period. Any areas of disagreement with residents documentation of care or procedures are noted on the chart. I was personally present for the key portions of any/all procedures during my duty period. I have documented in the chart those procedures where I was not present during the key portions.     Flank pain, colitis, mass, admitted    Karlee Merritt DO  Attending Physician     Karlee Merritt DO  04/04/21 0110

## 2021-04-04 NOTE — PLAN OF CARE
Problem: Falls - Risk of:  Goal: Will remain free from falls  Description: Will remain free from falls  4/4/2021 1815 by Evelyn Garland RN  Outcome: Ongoing  4/4/2021 0525 by Ezekiel Yousif RN  Outcome: Ongoing  Goal: Absence of physical injury  Description: Absence of physical injury  4/4/2021 1815 by Evelyn Garland RN  Outcome: Ongoing  4/4/2021 0525 by Ezekiel Yousif RN  Outcome: Ongoing     Problem: Pain:  Goal: Pain level will decrease  Description: Pain level will decrease  4/4/2021 1815 by Evelyn Garland RN  Outcome: Ongoing  4/4/2021 0525 by Ezekiel Yousif RN  Outcome: Ongoing  Goal: Control of acute pain  Description: Control of acute pain  4/4/2021 1815 by Evelyn Garland RN  Outcome: Ongoing  4/4/2021 0525 by Ezekiel Yousif RN  Outcome: Ongoing  Goal: Control of chronic pain  Description: Control of chronic pain  4/4/2021 1815 by Evelyn Garland RN  Outcome: Ongoing  4/4/2021 0525 by Ezekiel Yousif RN  Outcome: Ongoing     Problem: IP BALANCE  Goal: BALANCE EDUCATION  Description: Educate patients on maintaining dynamic/static standing/sitting balance, with/without upper extremity support.   Outcome: Ongoing     Problem: IP MOBILITY  Goal: LTG - patient will ambulate household distance  Outcome: Ongoing

## 2021-04-04 NOTE — H&P
[facility]  Office: [address], DO, Loren Knight, DO, Gracie Aguilar, DO, Marni Luong Blood, DO, Mirza Figueroa MD, Akash Moscoso MD, Bhavna Fuchs MD, Anselmo Riggs MD, Kimi Soriano MD, Efren Case MD, Bran Levy MD, Jennie Ambriz MD, Lety Felton, DO, Catherine Gilliam MD, Cyn Bautista, DO, Leland Marroquin MD,  Angeline Aoms DO, Angel Pratt MD, Meena Roach MD, Jennifer Aguilar MD, Ligia Hong MD, Alisa Butcher, [facility], [address], [facility], Isadora Oh, CNP, Michael Ramos, CNS, Roldan Green, CNP, Yuli Sanchez, CNP, Ofelia Birch, CNP, Andreia Long, CNP, Graham Marks, CNP, Timothy Bonilla PA-C, Tabatha Corea, [facility], Mariposa Zambrano, CNP, Lesa Collazo, [facility], Raymundo Ledezma, CNP, Richi Walker, CNP, Lopez Whaley, CNP, Bonny Mcnulty, [address]    HISTORY AND PHYSICAL EXAMINATION            Date:   4/4/2021  Patient name:  Emilia Pimentel  Date of admission:  4/3/2021  7:10 PM  MRN:   [id number]  Account:  [de-identified]  YOB: 1955  PCP:    No primary care provider on file. Room:   PAOLA/PAOLA  Code Status:    Prior    Chief Complaint:     Chief Complaint   Patient presents with    Flank Pain     right side        History Obtained From:     patient, electronic medical record    History of Present Illness:     Emi Daniels is a 77 y.o. Non-/non  female who presents with Flank Pain (right side )   and is admitted to the hospital for the management of Pancreatic mass. This is a 64-year-old female with underlying history of hepatitis C, COPD (not oxygen dependent), Hx: PE (on eliquis) ,AVN of the right hip s/p BASILIO, chronic diastolic heart failure, previous CVA, hypertension, and PUD who presents to the ER with complaints of right flank pain with associated nausea and vomiting over the last 2 days.   Abdominal pain with dull and acute onset, but has worsened in intensity over the last 24 hours. Patient endorses generalized abdominal discomfort, however, most significantly to the right upper quadrant and right flank. She endorses associated nausea and multiple episodes of emesis, without diarrhea. She denies any hematemesis or bloody stools. Patient also reports recent weight loss ~15 lbs over the last few months with associated loss of appetite. Of note, patient is recently homeless within the last 3 days and has not taken any of her medications during this time frame. She tells me she has not had a PCP for ~ one month, but does have a  through [facility]. She denies any current alcohol or drug use, but does have a remote history of alcoholism. She denies any chest pain, shortness of breath, headache/dizziness, or lightheadedness, denies cough and is afebrile. She denies any recent sick contacts or recent travel. Patient does voice concerns for discharge plans with immediate need for housing. Presenting labs: Sodium: 133, potassium: 4.8, chloride: 96, CO2: 18, anion gap: 19, proBNP: 742, troponin: 9, alk phos: 111, ALT: 51, AST: 66, WBC: 7.0, hemoglobin: 17.5, platelets: 644. CT abdomen: 2.3 x 1.9 cm pancreatic mass with pancreatic duct dilation concerning for neoplasm, ascending colonic wall thickness suggestive of inflammatory or infective colitis. In regards to the pancreatic mass, patient denies being aware of any previous pancreatic lesion or pancreatic mass. Patient was given 1 dose IV Rocephin in the emergency department and is admitted to [facility] for further management. My evaluation, patient is lying in bed and appears comfortable despite rating her pain is at a 6 out of 10. She does have increased discomfort on palpation of the right upper quadrant and right flank. Abdomen is slightly distended with active bowel sounds. She denies any chest pain or shortness of breath.   She is alert oriented and cooperative but does tell me she occasionally has issues with her memory. Patient is afebrile. States she has not had a meal in the last 3 days due to recently becoming homeless. She also tells me she has not had any of her home medications the last several days.     Past Medical History:     Past Medical History:   Diagnosis Date    Appendicitis 3/27/2017    Cerebral artery occlusion with cerebral infarction ([address]     CHF (congestive heart failure) (HCC)     Chronic respiratory failure with hypoxia and hypercapnia (HCC)     Chronic rhinitis     Cigarette smoker     COPD (chronic obstructive pulmonary disease) ([address] 2018    Depression     Dysphagia     Essential hypertension 2018    GERD (gastroesophageal reflux disease)     Heart failure, diastolic, with acute decompensation ([address] 2018    Hepatitis C, chronic (HCC)     History of smoking at least 1 pack per day for at least 30 years     Hyperlipidemia     Hypertension     Migraine     Moderate COPD (chronic obstructive pulmonary disease) (HCC)     Multiple transfusions     Neurogenic dysphagia     Osteoarthritis     hands    Pneumonia     Scoliosis     Substance abuse ([facility]     ivda and cocaine abuse in past        Past Surgical History:     Past Surgical History:   Procedure Laterality Date    APPENDECTOMY  2017     SECTION      GASTRIC BYPASS SURGERY      HIP SURGERY Right 2019    HIP TOTAL ARTHROPLASTY ANTERIOR APPROACH - MEDACTA,  C-ARM, (Right )    LAPAROSCOPIC APPENDECTOMY N/A 3/27/2017    APPENDECTOMY LAPAROSCOPIC performed by Jared Mitchell MD at [address] [place] [address]      hgsil    MEDICATION INJECTION Left 3/25/2021    STEROID INJECTION LEFT HIP  WITH FLUORO performed by Lauren Aponte MD at [address] [place] [address] W/BIOPSY SINGLE/MULTIPLE N/A 11/10/2018    COLONOSCOPY WITH BIOPSY performed by Tyler Grullon MD at [facility]    MT EGD TRANSORAL BIOPSY SINGLE/MULTIPLE N/A 2018    EGD ESOPHAGOGASTRODUODENOSCOPY performed by Meghann Moore MD at [address] Right 8/13/2019    HIP TOTAL ARTHROPLASTY ANTERIOR APPROACH - [facility],  C-ARM, [facility] S[facility] DO [facility]    oesophagitis, candidiasis    UPPER GASTROINTESTINAL ENDOSCOPY  11/05/2018    UPPER GASTROINTESTINAL ENDOSCOPY N/A 11/5/2018    EGD CONTROL HEMORRHAGE performed by Meghann Moore MD at [facility]        Medications Prior to Admission:     Prior to Admission medications    Medication Sig Start Date End Date Taking? Authorizing Provider   butalbital-acetaminophen-caffeine (FIORICET, ESGIC) -36 MG per tablet Take 1 tablet by mouth every 12 hours for 5 doses 3/28/21 3/31/21  Molly Rea MD   gabapentin (NEURONTIN) 300 MG capsule Take 1 capsule by mouth 3 times daily for 30 days. 3/16/21 4/15/21  Ivonne Uriarte MD   traMADol (ULTRAM) 50 MG tablet Take 1 tablet by mouth 2 times daily as needed for Pain for up to 30 days.  3/16/21 4/15/21  Ivonne Uriarte MD   albuterol sulfate HFA (PROVENTIL HFA) 108 (90 Base) MCG/ACT inhaler Inhale 1-2 puffs into the lungs once for 1 dose 3/13/21 3/13/21  Margarito Tomlinson DO   Elastic Bandages & Supports (LUMBAR BACK BRACE/SUPPORT PAD) MISC 1 Units by Does not apply route daily 2/10/21 2/10/22  Ivonne Uriarte MD   lisinopril (PRINIVIL;ZESTRIL) 10 MG tablet Take 1 tablet by mouth daily 2/9/21   Sapphire Thomas MD   amLODIPine (NORVASC) 10 MG tablet Take 1 tablet by mouth daily 2/9/21 2/9/21  Sapphire Thomas MD   mirtazapine (REMERON) 30 MG tablet  11/3/20   Historical Provider, MD   Sennosides (SENNA) 8.6 MG CAPS 8.6 capsules 8/2/20   Historical Provider, MD   apixaban (ELIQUIS) 5 MG TABS tablet Take 1 tablet by mouth 2 times daily 8/7/20   Sadi Christie MD   amitriptyline (ELAVIL) 10 MG tablet Take 1 tablet by mouth nightly 1/31/20   Sandy Dinero DO   atorvastatin (LIPITOR) 40 MG tablet Take 1 tablet by mouth nightly 1/31/20   Eli oMss DO Sina   clopidogrel (PLAVIX) 75 MG tablet Take 1 tablet by mouth daily 2/1/20   Benjie Alvarez DO   Misc. Devices [facility]) MISC 1 Device by Does not apply route once for 1 dose 8/5/19 8/5/19  Karrie Rodriguez PA-C   carvedilol (COREG) 6.25 MG tablet Take 1 tablet by mouth 2 times daily (with meals) 2/7/19   Delilah Argueta MD   ferrous sulfate (MELISSA-HAO) 325 (65 Fe) MG tablet Take 1 tablet by mouth daily 2/7/19   Delilah Argueta MD   melatonin 1 MG tablet Take 1 tablet by mouth nightly as needed for Sleep 1/11/19   Aiyana Ohara MD   ipratropium-albuterol (DUONEB) 0.5-2.5 (3) MG/3ML SOLN nebulizer solution Inhale 3 mLs into the lungs every 4 hours as needed for Shortness of Breath 1/11/19   Aiyana Ohara MD   mometasone-formoterol [facility]) 100-5 MCG/ACT inhaler Inhale 2 puffs into the lungs 2 times daily 12/5/18   Verna Carrington MD   pantoprazole (PROTONIX) 40 MG tablet Take 1 tablet by mouth 2 times daily (before meals)  Patient not taking: Reported on 12/8/2020 11/13/18   Kristine Piedra MD   folic acid (FOLVITE) 1 MG tablet Take 1 tablet by mouth daily 10/13/18   Christianne Greenwood MD   vitamin B-1 100 MG tablet Take 1 tablet by mouth daily 10/13/18   Christianne Greenwood MD   vitamin B-12 250 MCG tablet Take 1 tablet by mouth daily 10/13/18   Christianne Greenwood MD   Multiple Vitamins-Minerals (THERAPEUTIC MULTIVITAMIN-MINERALS) tablet Take 1 tablet by mouth daily 8/12/18   Ekta Torres MD        Allergies:     Aspirin and Aliya Luria [cephalexin]    Social History:     Tobacco:    reports that she quit smoking about 2 years ago. Her smoking use included cigarettes. She started smoking about 51 years ago. She has a 40.00 pack-year smoking history. She has never used smokeless tobacco.  Alcohol:      reports no history of alcohol use. Drug Use:  reports no history of drug use.     Family History:     Family History   Problem Relation Age of Onset    Breast Cancer Mother     Heart Disease Father        Review normal.      Pupils: Pupils are equal, round, and reactive to light. Neck:      Musculoskeletal: Normal range of motion and neck supple. No neck rigidity or muscular tenderness. Cardiovascular:      Rate and Rhythm: Normal rate and regular rhythm. Pulses: Normal pulses. Heart sounds: Normal heart sounds. No murmur. No friction rub. No gallop. Pulmonary:      Effort: Pulmonary effort is normal. No respiratory distress. Breath sounds: No wheezing, rhonchi or rales. Abdominal:      General: Bowel sounds are normal. There is distension. Tenderness: There is abdominal tenderness. There is guarding. Musculoskeletal: Normal range of motion. Right lower leg: No edema. Left lower leg: No edema. Skin:     General: Skin is warm and dry. Capillary Refill: Capillary refill takes less than 2 seconds. Coloration: Skin is not jaundiced. Findings: No erythema or rash. Neurological:      General: No focal deficit present. Mental Status: She is alert and oriented to person, place, and time. Mental status is at baseline. Cranial Nerves: No cranial nerve deficit. Sensory: No sensory deficit. Motor: No weakness. Psychiatric:         Mood and Affect: Mood normal.         Behavior: Behavior normal.         Thought Content:  Thought content normal.         Judgment: Judgment normal.       Investigations:      Laboratory Testing:  Recent Results (from the past 24 hour(s))   CBC WITH AUTO DIFFERENTIAL    Collection Time: 04/03/21  7:33 PM   Result Value Ref Range    WBC 7.0 3.5 - 11.3 k/uL    RBC 5.37 (H) 3.95 - 5.11 m/uL    Hemoglobin 17.5 (H) 11.9 - 15.1 g/dL    Hematocrit 52.2 (H) 36.3 - 47.1 %    MCV 97.2 82.6 - 102.9 fL    MCH 32.6 25.2 - 33.5 pg    MCHC 33.5 28.4 - 34.8 g/dL    RDW 14.5 (H) 11.8 - 14.4 %    Platelets See Reflexed IPF Result 138 - 453 k/uL    MPV NOT REPORTED 8.1 - 13.5 fL    NRBC Automated 0.0 0.0 per 100 WBC    Differential Type NOT REPORTED     WBC Morphology NOT REPORTED     RBC Morphology ANISOCYTOSIS PRESENT     Platelet Estimate NOT REPORTED     Seg Neutrophils 66 (H) 36 - 65 %    Lymphocytes 26 24 - 43 %    Monocytes 7 3 - 12 %    Eosinophils % 1 1 - 4 %    Basophils 0 0 - 2 %    Immature Granulocytes 1 (H) 0 %    Segs Absolute 4.61 1.50 - 8.10 k/uL    Absolute Lymph # 1.82 1.10 - 3.70 k/uL    Absolute Mono # 0.46 0.10 - 1.20 k/uL    Absolute Eos # 0.07 0.00 - 0.44 k/uL    Basophils Absolute <0.03 0.00 - 0.20 k/uL    Absolute Immature Granulocyte 0.04 0.00 - 0.30 k/uL   COMPREHENSIVE METABOLIC PANEL    Collection Time: 04/03/21  7:33 PM   Result Value Ref Range    Glucose 91 70 - 99 mg/dL    BUN 12 8 - 23 mg/dL    CREATININE 0.56 0.50 - 0.90 mg/dL    Bun/Cre Ratio NOT REPORTED 9 - 20    Calcium 10.6 (H) 8.6 - 10.4 mg/dL    Sodium 133 (L) 135 - 144 mmol/L    Potassium 4.8 3.7 - 5.3 mmol/L    Chloride 96 (L) 98 - 107 mmol/L    CO2 18 (L) 20 - 31 mmol/L    Anion Gap 19 (H) 9 - 17 mmol/L    Alkaline Phosphatase 111 (H) 35 - 104 U/L    ALT 51 (H) 5 - 33 U/L    AST 66 (H) <32 U/L    Total Bilirubin 0.84 0.3 - 1.2 mg/dL    Total Protein 10.4 (H) 6.4 - 8.3 g/dL    Albumin 5.1 3.5 - 5.2 g/dL    Albumin/Globulin Ratio 1.0 1.0 - 2.5    GFR Non-African American >60 >60 mL/min    GFR African American >60 >60 mL/min    GFR Comment          GFR Staging NOT REPORTED    Troponin    Collection Time: 04/03/21  7:33 PM   Result Value Ref Range    Troponin, High Sensitivity 9 0 - 14 ng/L    Troponin T NOT REPORTED <0.03 ng/mL    Troponin Interp NOT REPORTED    LIPASE    Collection Time: 04/03/21  7:33 PM   Result Value Ref Range    Lipase 44 13 - 60 U/L   Brain Natriuretic Peptide    Collection Time: 04/03/21  7:33 PM   Result Value Ref Range    Pro- (H) <300 pg/mL    BNP Interpretation Pro-BNP Reference Range:    Immature Platelet Fraction    Collection Time: 04/03/21  7:33 PM   Result Value Ref Range    Platelet, Immature Fraction 1.2 1.1 - 10.3 % Platelet, Fluorescence 393 138 - 453 k/uL   Urinalysis Reflex to Culture    Collection Time: 04/04/21 12:09 AM    Specimen: Urine, clean catch   Result Value Ref Range    Color, UA DARK YELLOW (A) YELLOW    Turbidity UA CLEAR CLEAR    Glucose, Ur NEGATIVE NEGATIVE    Bilirubin Urine NEGATIVE NEGATIVE    Ketones, Urine TRACE (A) NEGATIVE    Specific Gravity, UA 1.062 (H) 1.005 - 1.030    Urine Hgb NEGATIVE NEGATIVE    pH, UA 7.0 5.0 - 8.0    Protein, UA 1+ (A) NEGATIVE    Urobilinogen, Urine Normal Normal    Nitrite, Urine NEGATIVE NEGATIVE    Leukocyte Esterase, Urine MODERATE (A) NEGATIVE    Urinalysis Comments NOT REPORTED        Imaging/Diagnostics:  Ct Abdomen Pelvis W Iv Contrast Additional Contrast? None    Result Date: 4/3/2021  Thickening of the wall of the ascending colon with mild surrounding fatty stranding is likely suggestive of infectious/inflammatory colitis. Apparent hyperenhancement of the mucosa of the stomach. Finding is nonspecific but may be suggestive of gastritis. 2.3 x 1.9 cm peripherally enhancing lesion appears to arise from the mid body of the pancreas. The peripheral aspect of this lesion has a more solid appearance in comparison to prior exam. There is also associated mild dilatation of pancreatic duct. 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 For further evaluation of this finding contrast enhance MRCP examination is recommended. Severe degenerative changes of the left hip joint with bone-on-bone appearance. Xr Chest Portable    Result Date: 3/29/2021  No acute cardiopulmonary process. Xr Abdomen (2 Views)    Result Date: 3/29/2021  Nonspecific but nonobstructive bowel gas pattern.        Assessment :      Hospital Problems           Last Modified POA    * (Principal) Pancreatic mass 4/4/2021 Yes    Essential hypertension 4/4/2021 Yes    COPD without exacerbation ([facility] 4/4/2021 Yes    History of hepatitis C (Chronic) 4/4/2021 Yes    Overview Signed 8/11/2019  2:24 PM by Rupali Hall DO     Previously treated at [facility]         Chronic diastolic heart failure ([facility] [address] [address] 4/4/2021 Yes    Homelessness 4/4/2021 Yes    Colitis 4/4/2021 Yes          Plan:     Patient status inpatient in the Med/Surge    Pancreatic mass: Consider GI consult in a.m, MRCP ordered for am given CT recommendation  Colitis: continue IV fluids and IV antibiotics for now and keep NPO, prn antiemetics and pain control as needed  HTN: resume home dose lisinopril and coreg, will add prn control if needed  Chronic Diastolic Heart Failure: Last ECHO with EF: 68%, no evidence of acute exacerbation  COPD: non oxygen dependent, no acute exacerbation, prn albuterol   Hepatitis C: completed treatment at [facility]  PUD: previous duodenal ulcer s/p clipping and near ulcerations at the ileocecal valve, on PPI  Homelessness: social work consulted for assistance with discharge planning/need for housing  Routine labs, select home medications continued, NPO   DVT/PPI    Consultations:   IP CONSULT TO HOSPITALIST  IP CONSULT TO SOCIAL WORK     Patient is admitted as inpatient status because of co-morbidities listed above, severity of signs and symptoms as outlined, requirement for current medical therapies and most importantly because of direct risk to patient if care not provided in a hospital setting. Expected length of stay > 48 hours. JAYDON Manning NP  4/4/2021  2:17 AM    Copy sent to Dr. Melanie Bates primary care provider on file.

## 2021-04-04 NOTE — PROGRESS NOTES
Occupational Therapy   Occupational Therapy Initial Assessment  Date: 2021   Patient Name: Samson Nuñez  MRN: [id number]     : 1955  CHIEF COMPLAINT             Chief Complaint   Patient presents with    Flank Pain       right side          HISTORY OFPRESENT ILLNESS  (Location/Symptom, Timing/Onset, Context/Setting, Quality, Duration, Modifying Factors,Severity.)       Emi Daniels is a 77 y.o. female with history of thrombocytosis, hepatitis C, substance abuse, peptic ulcer disease, pancreas lesion, COPD, stroke, CRYSTAL who presents with right-sided flank pain. Patient states that pain has been persistent over the past 2 days but has had some worsening over the past 30 minutes prior to arrival, has had nausea with vomiting with the symptoms, but the pain is 8 out of intensity, states that the right upper quadrant rating to the back. Denies exacerbating or relieving factors, states that she has a history of pain similar to this before in the past.  Patient does have a history of multiple abdominal surgeries including , appendectomy, patient is still passing flatus, having bowel movements, has endorsed polyuria. Plan is also to monitor his respiratory home with similar symptoms    Date of Service: 2021    Discharge Recommendations:    Further therapy recommended at discharge. Assessment   Performance deficits / Impairments: Decreased functional mobility ; Decreased ADL status; Decreased high-level IADLs;Decreased endurance;Decreased strength;Decreased balance  Assessment: pt would benefit from further therapy at discharge in order to increase safety and independence with ADLS and functional transfers/mobility.   Treatment Diagnosis: pancreatic mass  Prognosis: Good  Decision Making: Medium Complexity  Patient Education: pt ed on POc, purpose of eval, importance of movement, safety during functional transfers/functional mobility, benefits of therapy. good/fair return  REQUIRES OT FOLLOW UP: Yes  Activity Tolerance  Activity Tolerance: Patient Tolerated treatment well;Patient limited by pain  Safety Devices  Safety Devices in place: Yes  Type of devices: Gait belt;Call light within reach; Left in bed;Bed alarm in place  Restraints  Initially in place: No           Patient Diagnosis(es): The primary encounter diagnosis was Right flank pain. Diagnoses of Acute gastritis without hemorrhage, unspecified gastritis type, Colitis, and Pancreatic mass were also pertinent to this visit. has a past medical history of Appendicitis, Cerebral artery occlusion with cerebral infarction ([address] CHF (congestive heart failure) ([address] Chronic respiratory failure with hypoxia and hypercapnia (HCC), Chronic rhinitis, Cigarette smoker, COPD (chronic obstructive pulmonary disease) ([address] Depression, Dysphagia, Essential hypertension, GERD (gastroesophageal reflux disease), Heart failure, diastolic, with acute decompensation ([address] Hepatitis C, chronic ([address] History of smoking at least 1 pack per day for at least 30 years, Hyperlipidemia, Hypertension, Migraine, Moderate COPD (chronic obstructive pulmonary disease) ([address] Multiple transfusions, Neurogenic dysphagia, Osteoarthritis, Pneumonia, Scoliosis, and Substance abuse ([address] has a past surgical history that includes  section; LEEP (); Upper gastrointestinal endoscopy (); Gastric bypass surgery; Appendectomy (2017); laparoscopic appendectomy (N/A, 3/27/2017); Upper gastrointestinal endoscopy (2018); Upper gastrointestinal endoscopy (N/A, 2018); pr colonoscopy w/biopsy single/multiple (N/A, 11/10/2018); pr egd transoral biopsy single/multiple (N/A, 2018); hip surgery (Right, 2019); Total hip arthroplasty (Right, 2019); and Medication Injection (Left, 3/25/2021).     Treatment Diagnosis: pancreatic mass      Restrictions  Restrictions/Precautions  Restrictions/Precautions: Fall Risk  Required Braces or Orthoses?: No  Position Activity Restriction  Other position/activity restrictions: up with assist    Subjective   General  Patient assessed for rehabilitation services?: Yes  Family / Caregiver Present: No  Diagnosis: pancreatic mass  Subjective  Subjective: pt became tearful during session due to recently becoming homeless and limited support. writer provided emotional support and active listening throughout  General Comment  Comments: RN ok'd for therapy this morning. Pt agreeable to participate in session following encouragement. Patient Currently in Pain: Yes  Pain Assessment  Pain Assessment: 0-10  Pain Level: 7  Pain Type: Chronic pain  Pain Location: Back;Head  Non-Pharmaceutical Pain Intervention(s): Emotional support;Distraction; Therapeutic presence  Response to Pain Intervention: Patient Satisfied    Oxygen Therapy  O2 Device: None (Room air)    Social/Functional History  Social/Functional History  Lives With: Other (comment)  Type of Home: Homeless  Home Equipment: Quad cane  ADL Assistance: Independent  Ambulation Assistance: Independent  Transfer Assistance: Independent  Additional Comments: pt reported being homeless for 4 days and has beenhaving increased difficulty with functional mobility       Objective   Vision: Impaired  Vision Exceptions: Wears glasses at all times  Hearing: Within functional limits    Orientation  Overall Orientation Status: Within Functional Limits  Observation/Palpation  Observation: Incision right anterior thigh. L scar distal anterior thigh.   Balance  Sitting Balance: Modified independent (~10 minutes on eOB)  Standing Balance: Contact guard assistance(with quadcane)  Standing Balance  Time: ~2 minutes  Activity: pt completed static standing at bedside to don pants over hips and took side steps toward [facility]  Comment: pt slightly unsteady on feet during side steps  ADL  Feeding: Modified independent   Grooming: Supervision  UE Bathing: Stand by assistance  LE Bathing: Minimal assistance  UE Dressing: Stand by assistance  LE Dressing: Minimal assistance  Toileting: Contact guard assistance  Additional Comments: OT facilitated pt in [facility] pants and B socks while sitting on eOB.  pt required increased assistance for socks due to pain with bending forward  Tone RUE  RUE Tone: Normotonic  Tone LUE  LUE Tone: Normotonic  Coordination  Movements Are Fluid And Coordinated: Yes     Bed mobility  Supine to Sit: Stand by assistance  Sit to Supine: Minimal assistance  Scooting: Contact guard assistance  Transfers  Sit to stand: Contact guard assistance  Stand to sit: Contact guard assistance     Cognition  Overall Cognitive Status: WFL        Sensation  Overall Sensation Status: Impaired(pt reported N/T to R UE occasionally)        LUE AROM (degrees)  LUE AROM : Exceptions  L Shoulder Flexion 0-180: 0-90  L Elbow Flexion 0-145: WFL  L Wrist Flexion 0-80: WFL  L Wrist Extension 0-70: WFL  Left Hand AROM (degrees)  Left Hand AROM: WFL  RUE AROM (degrees)  RUE AROM : Exceptions  R Shoulder Flexion 0-180: 0-90  R Elbow Flexion 0-145: WFL  R Wrist Flexion 0-80: WFL  R Wrist Extension 0-70: WFL  Right Hand AROM (degrees)  Right Hand AROM: WFL  LUE Strength  Gross LUE Strength: Exceptions to [facility]  L Hand General: 4-/5  RUE Strength  Gross RUE Strength: Exceptions to [facility]  R Hand General: 4-/5         Plan   Plan  Times per week: 3-4x/wk    AM-PAC Score        AM-[facility] Inpatient Daily Activity Raw Score: 19 (04/04/21 1317)  AM-PAC Inpatient ADL T-Scale Score : 40.22 (04/04/21 1317)  ADL Inpatient CMS 0-100% Score: 42.8 (04/04/21 1317)  ADL Inpatient CMS G-Code Modifier : CK (04/04/21 1317)    Goals  Short term goals  Time Frame for Short term goals: pt will, by discharge  Short term goal 1: complete LB ADLs and toileting tasks with SBA and set up  Short term goal 2: complete UB ADLs and grooming tasks with mod I  Short term goal 3: increase activity tolerane to 15+ minutes in order to participate in daily tasks  Short term goal 4: dem SBA during functional transfers/functional mobility with LRD ,as needed  Short term goal 5: dem ~5 minutes dynamic standing tolerance with SBA and LRd in order to complete functional tasks       Therapy Time   Individual Concurrent Group Co-treatment   Time In 1055         Time Out 1112         Minutes [address] [facility] [place], OTR/L

## 2021-04-04 NOTE — FLOWSHEET NOTE
Assessment: The patient appeared to be anxious and fearful. She shared her exhaution and her need for help. The patient shared that she is homeless, getting older and her health is failing her. Intervention:  engaged in active listening.  asked if they would like prayer and informed them chaplains are available 24/7.  did check and saw there is a consult in for social work. Outcome: They engaged in the conversation. Chaplains will remain available to offer spiritual and emotional support as needed. 04/04/21 1529   Encounter Summary   Services provided to: Patient   Referral/Consult From: [facility]   (04/04/21)   Complexity of Encounter High   Length of Encounter 30 minutes   Spiritual Assessment Completed Yes   Routine   Type Initial   Assessment Anxious; Fearful; Angry   Intervention Explored feelings, thoughts, concerns   Outcome Expressed feelings/needs/concerns;Expressed gratitude

## 2021-04-04 NOTE — CONSULTS
[facility]  Consultation Note     . REASON FOR CONSULTATION:  Pancreatic mass and colitis      HISTORY OF PRESENT ILLNESS:     This is a 77 y.o. female who presents with     PMH : GERD, HTN, Substance abuse, COPD, CHF, Hepatitis C, CVA. C/C of nausea and non bilious vomiting since the last 3 days along with acute  Onset epigastric and  right flank pain which has progressed in the last 2 days. Pt indicates she is constipated and hasn't eaten since the last 3 days because of pain, nausea and vomiting. She does have hx of pancreatic cyst in the past.    She denies any hematemesis , diarrhea and bloating. Pt denies any smoking, alcohol and drug intake. In the ED pt was hypertensive and had a CT scan which showed   2.3 x 1.9 cm peripherally enhancing lesion appears to arise from the mid body   of the pancreas     Cyst appearing mass hasnot not progressed from previous imaging    Pt has been admitted for concerned pancreatic mass for further work up. Previous GI history: Peptic ulcer disease, Pancreatic cyst,  Esophageal Candidiasis in 2008.      PAST MEDICAL HISTORY:  Past Medical History:   Diagnosis Date    Appendicitis 3/27/2017    Cerebral artery occlusion with cerebral infarction ([address]     CHF (congestive heart failure) (HCC)     Chronic respiratory failure with hypoxia and hypercapnia (HCC)     Chronic rhinitis     Cigarette smoker     COPD (chronic obstructive pulmonary disease) ([address] 8/5/2018    Depression     Dysphagia     Essential hypertension 8/11/2018    GERD (gastroesophageal reflux disease)     Heart failure, diastolic, with acute decompensation ([address] 9/5/2018    Hepatitis C, chronic (HCC)     History of smoking at least 1 pack per day for at least 30 years     Hyperlipidemia     Hypertension     Migraine     Moderate COPD (chronic obstructive pulmonary disease) (HCC)     Multiple transfusions     Neurogenic dysphagia     Osteoarthritis hands    Pneumonia     Scoliosis     Sub[facility] [address]     ivda and cocaine abuse in past     Past Surgical History:   Procedure Laterality Date    APPENDECTOMY  2017     SECTION      GASTRIC BYPASS SURGERY      HIP SURGERY Right 2019    HIP TOTAL ARTHROPLASTY ANTERIOR APPROACH - [facility],  C-ARM, (Right )    LAPAROSCOPIC APPENDECTOMY N/A 3/27/2017    APPENDECTOMY LAPAROSCOPIC performed by Trudy Vazquez MD at [address] [place]      hgsil    MEDICATION INJECTION Left 3/25/2021    STEROID INJECTION LEFT HIP  WITH FLUORO performed by Edna Pompa MD at [address] [facility] N/A 11/10/2018    COLONOSCOPY WITH BIOPSY performed by Fabian Pringle MD at [facility]    [place] EGD TRANSORAL BIOPSY SINGLE/MULTIPLE N/A 2018    EGD ESOPHAGOGASTRODUODENOSCOPY performed by Rebeca Painter MD at [address] [facility] Right 2019    HIP TOTAL ARTHROPLASTY ANTERIOR APPROACH - [facility],  C-ARM, performed by Marito Vines DO at [facility]      oesophagitis, candidiasis    UPPER GASTROINTESTINAL ENDOSCOPY  2018    UPPER GASTROINTESTINAL ENDOSCOPY N/A 2018    EGD CONTROL HEMORRHAGE performed by Rebeca Painter MD at [address]:  Allergies   Allergen Reactions    Aspirin Itching    Keflet [Cephalexin]      Tolerated penicillin V , tolerates cephalexin        HOME MEDICATIONS:  Prior to Admission medications    Medication Sig Start Date End Date Taking? Authorizing Provider   gabapentin (NEURONTIN) 300 MG capsule Take 1 capsule by mouth 3 times daily for 30 days. 3/16/21 4/15/21 Yes Edna Pompa MD   traMADol (ULTRAM) 50 MG tablet Take 1 tablet by mouth 2 times daily as needed for Pain for up to 30 days.  3/16/21 4/15/21 Yes Edna Pompa MD   lisinopril (PRINIVIL;ZESTRIL) 10 MG tablet Take 1 tablet by mouth daily 21  Yes Jeramy Glover MD   amitriptyline (ELAVIL) 10 MG tablet Take 1 tablet by mouth nightly 1/31/20  Yes Sandy Dinero,    carvedilol (COREG) 6.25 MG tablet Take 1 tablet by mouth 2 times daily (with meals) 2/7/19  Yes Nyla Lim MD   melatonin 1 MG tablet Take 1 tablet by mouth nightly as needed for Sleep 1/11/19  Yes Jyothi Silva MD   ipratropium-albuterol (DUONEB) 0.5-2.5 (3) MG/3ML SOLN nebulizer solution Inhale 3 mLs into the lungs every 4 hours as needed for Shortness of Breath 1/11/19  Yes Jyothi Silva MD   mometasone-formoterol [facility]) 100-5 MCG/ACT inhaler Inhale 2 puffs into the lungs 2 times daily 12/5/18  Yes Dallin Bae MD   butalbital-acetaminophen-caffeine (FIORICET, ESGIC) -40 MG per tablet Take 1 tablet by mouth every 12 hours for 5 doses 3/28/21 3/31/21  Molly Rea MD   albuterol sulfate HFA (PROVENTIL HFA) 108 (90 Base) MCG/ACT inhaler Inhale 1-2 puffs into the lungs once for 1 dose 3/13/21 3/13/21  Margarito Tomlinson DO   Elastic Bandages & Supports (LUMBAR BACK BRACE/SUPPORT PAD) MISC 1 Units by Does not apply route daily 2/10/21 2/10/22  Ivonne Uriarte MD   amLODIPine (NORVASC) 10 MG tablet Take 1 tablet by mouth daily 2/9/21 2/9/21  Sapphire Thomas MD   mirtazapine (REMERON) 30 MG tablet  11/3/20   Historical Provider, MD   Sennosides (SENNA) 8.6 MG CAPS 8.6 capsules 8/2/20   Historical Provider, MD   apixaban (ELIQUIS) 5 MG TABS tablet Take 1 tablet by mouth 2 times daily 8/7/20   Sadi Christie MD   atorvastatin (LIPITOR) 40 MG tablet Take 1 tablet by mouth nightly 1/31/20   Sandy Dinero DO   clopidogrel (PLAVIX) 75 MG tablet Take 1 tablet by mouth daily 2/1/20   Sandy Dinero DO   Misc.  Devices [facility]) MISC 1 Device by Does not apply route once for 1 dose 8/5/19 8/5/19  Suki Upton PA-C   ferrous sulfate (MELISSA-HAO) 325 (65 Fe) MG tablet Take 1 tablet by mouth daily 2/7/19   Nyla Lim MD   pantoprazole (PROTONIX) 40 MG tablet Take 1 tablet by mouth 2 times daily (before meals)  Patient not taking: Reported on 12/8/2020 11/13/18   Rush Bethea MD   folic acid (FOLVITE) 1 MG tablet Take 1 tablet by mouth daily 10/13/18   Evelyn Franco MD   vitamin B-1 100 MG tablet Take 1 tablet by mouth daily 10/13/18   Evelyn Franco MD   vitamin B-12 250 MCG tablet Take 1 tablet by mouth daily 10/13/18   Evelyn Franco MD   Multiple Vitamins-Minerals (THERAPEUTIC MULTIVITAMIN-MINERALS) tablet Take 1 tablet by mouth daily 8/12/18   Elba Van MD     .  CURRENT MEDICATIONS:  Scheduled Meds:  Nelma Fridge by provider] amitriptyline  10 mg Oral Nightly    [Held by provider] apixaban  5 mg Oral BID    [Held by provider] atorvastatin  40 mg Oral Nightly    [Held by provider] carvedilol  6.25 mg Oral BID WC    [Held by provider] clopidogrel  75 mg Oral Daily    [Held by provider] ferrous sulfate  325 mg Oral Daily    [Held by provider] folic acid  1 mg Oral Daily    [Held by provider] gabapentin  300 mg Oral TID    [Held by provider] lisinopril  10 mg Oral Daily    sodium chloride flush  10 mL Intravenous 2 times per day    famotidine (PEPCID) injection  20 mg Intravenous BID    ciprofloxacin  400 mg Intravenous Q12H    enoxaparin  30 mg Subcutaneous Daily     . Continuous Infusions:   sodium chloride 100 mL/hr at 04/04/21 0232    sodium chloride       . PRN Meds:melatonin, sodium chloride flush, sodium chloride, potassium chloride, magnesium sulfate, acetaminophen, HYDROcodone 5 mg - acetaminophen **OR** HYDROcodone 5 mg - acetaminophen, HYDROmorphone **OR** HYDROmorphone, promethazine **OR** ondansetron, labetalol  .   SOCIAL HISTORY:     Social History     Socioeconomic History    Marital status: Single     Spouse name: Not on file    Number of children: Not on file    Years of education: Not on file    Highest education level: Not on file   Occupational History    Not on file   Social Needs    Financial resource strain: Not on file    Food insecurity     Worry: Not on for: A1A  AMA:  No results found for: Zhang Burkett    ASMA:  No results found for: SMOOTHMUSCAB    ANCA:    BARBARA:  Lab Results   Component Value Date    BARBARA NEGATIVE 02/12/2018       Anti - Liver/Kidney Ab:  No results found for: LIVER-KIDNEYMICROSOMALAB    Ceruloplasmin:  No results found for: CERULOPLSM    Celiac panel:  No results found for: TISSTRNTIIGG, TTGIGA, IGA    Cancer Markers:  CEA:    No results for input(s): CEA in the last 72 hours. Ca 125:   No results for input(s):  in the last 72 hours. Ca 19-9:     Invalid input(s):   AFP: No results for input(s): AFP in the last 72 hours. ASSESSMENT and PLAN:       Pancreatic cyst/ mass  COPD  Hepatitis C  Essential Hypertension  Hx of peptic ulcer disease    Plan :      EGD tomorrow to assess for any active ulcer and assessment for pancreatic cyst  Will Keep her NPO  Cancel MRI  Hold Eliquis for EGD tomorrow  Continue acid suppression  Thank you for consult   Call GI for further questioners    This plan was formulated in collaboration with Dr. Keshawn Atkins    Electronically signed by:    Nina Markham MD  Internal Medicine Resident, [facility];  [place], [place]  4/4/2021, 10:44 AM

## 2021-04-05 ENCOUNTER — APPOINTMENT (OUTPATIENT)
Dept: ULTRASOUND IMAGING | Age: 66
DRG: 249 | End: 2021-04-05
Payer: MEDICARE

## 2021-04-05 ENCOUNTER — ANESTHESIA (OUTPATIENT)
Dept: ENDOSCOPY | Age: 66
DRG: 249 | End: 2021-04-05
Payer: MEDICARE

## 2021-04-05 ENCOUNTER — ANESTHESIA EVENT (OUTPATIENT)
Dept: ENDOSCOPY | Age: 66
DRG: 249 | End: 2021-04-05
Payer: MEDICARE

## 2021-04-05 VITALS — DIASTOLIC BLOOD PRESSURE: 71 MMHG | RESPIRATION RATE: 17 BRPM | SYSTOLIC BLOOD PRESSURE: 160 MMHG

## 2021-04-05 LAB
CALCIUM IONIZED: 1.11 MMOL/L (ref 1.13–1.33)
CASE NUMBER:: NORMAL
CULTURE: NORMAL
EKG ATRIAL RATE: 93 BPM
EKG P AXIS: 78 DEGREES
EKG P-R INTERVAL: 144 MS
EKG Q-T INTERVAL: 396 MS
EKG QRS DURATION: 68 MS
EKG QTC CALCULATION (BAZETT): 492 MS
EKG R AXIS: 61 DEGREES
EKG T AXIS: 84 DEGREES
EKG VENTRICULAR RATE: 93 BPM
INR BLD: 1
Lab: NORMAL
PROTHROMBIN TIME: 10.9 SEC (ref 9.1–12.3)
SPECIMEN DESCRIPTION: NORMAL
SPECIMEN DESCRIPTION: NORMAL

## 2021-04-05 PROCEDURE — 6370000000 HC RX 637 (ALT 250 FOR IP): Performed by: NURSE PRACTITIONER

## 2021-04-05 PROCEDURE — 84478 ASSAY OF TRIGLYCERIDES: CPT

## 2021-04-05 PROCEDURE — 7100000010 HC PHASE II RECOVERY - FIRST 15 MIN: Performed by: INTERNAL MEDICINE

## 2021-04-05 PROCEDURE — 83690 ASSAY OF LIPASE: CPT

## 2021-04-05 PROCEDURE — 1200000000 HC SEMI PRIVATE

## 2021-04-05 PROCEDURE — 3700000000 HC ANESTHESIA ATTENDED CARE: Performed by: INTERNAL MEDICINE

## 2021-04-05 PROCEDURE — 2500000003 HC RX 250 WO HCPCS: Performed by: INTERNAL MEDICINE

## 2021-04-05 PROCEDURE — 88305 TISSUE EXAM BY PATHOLOGIST: CPT

## 2021-04-05 PROCEDURE — 6370000000 HC RX 637 (ALT 250 FOR IP): Performed by: INTERNAL MEDICINE

## 2021-04-05 PROCEDURE — 43242 EGD US FINE NEEDLE BX/ASPIR: CPT | Performed by: INTERNAL MEDICINE

## 2021-04-05 PROCEDURE — 85610 PROTHROMBIN TIME: CPT

## 2021-04-05 PROCEDURE — 2720000010 HC SURG SUPPLY STERILE: Performed by: INTERNAL MEDICINE

## 2021-04-05 PROCEDURE — 2500000003 HC RX 250 WO HCPCS: Performed by: NURSE PRACTITIONER

## 2021-04-05 PROCEDURE — 93010 ELECTROCARDIOGRAM REPORT: CPT | Performed by: INTERNAL MEDICINE

## 2021-04-05 PROCEDURE — 2500000003 HC RX 250 WO HCPCS: Performed by: NURSE ANESTHETIST, CERTIFIED REGISTERED

## 2021-04-05 PROCEDURE — 6360000002 HC RX W HCPCS: Performed by: NURSE ANESTHETIST, CERTIFIED REGISTERED

## 2021-04-05 PROCEDURE — 2580000003 HC RX 258: Performed by: INTERNAL MEDICINE

## 2021-04-05 PROCEDURE — 88173 CYTOPATH EVAL FNA REPORT: CPT

## 2021-04-05 PROCEDURE — 36415 COLL VENOUS BLD VENIPUNCTURE: CPT

## 2021-04-05 PROCEDURE — 88177 CYTP FNA EVAL EA ADDL: CPT

## 2021-04-05 PROCEDURE — 88172 CYTP DX EVAL FNA 1ST EA SITE: CPT

## 2021-04-05 PROCEDURE — 6360000002 HC RX W HCPCS: Performed by: NURSE PRACTITIONER

## 2021-04-05 PROCEDURE — 76975 GI ENDOSCOPIC ULTRASOUND: CPT

## 2021-04-05 PROCEDURE — 6360000002 HC RX W HCPCS: Performed by: INTERNAL MEDICINE

## 2021-04-05 PROCEDURE — 3700000001 HC ADD 15 MINUTES (ANESTHESIA): Performed by: INTERNAL MEDICINE

## 2021-04-05 PROCEDURE — 0FDG8ZX EXTRACTION OF PANCREAS, VIA NATURAL OR ARTIFICIAL OPENING ENDOSCOPIC, DIAGNOSTIC: ICD-10-PCS | Performed by: INTERNAL MEDICINE

## 2021-04-05 PROCEDURE — 82150 ASSAY OF AMYLASE: CPT

## 2021-04-05 PROCEDURE — 88342 IMHCHEM/IMCYTCHM 1ST ANTB: CPT

## 2021-04-05 PROCEDURE — 88341 IMHCHEM/IMCYTCHM EA ADD ANTB: CPT

## 2021-04-05 PROCEDURE — 82330 ASSAY OF CALCIUM: CPT

## 2021-04-05 PROCEDURE — 7100000011 HC PHASE II RECOVERY - ADDTL 15 MIN: Performed by: INTERNAL MEDICINE

## 2021-04-05 PROCEDURE — 99232 SBSQ HOSP IP/OBS MODERATE 35: CPT | Performed by: INTERNAL MEDICINE

## 2021-04-05 PROCEDURE — 3609020800 HC EGD W/EUS FNA: Performed by: INTERNAL MEDICINE

## 2021-04-05 RX ORDER — PROPOFOL 10 MG/ML
INJECTION, EMULSION INTRAVENOUS PRN
Status: DISCONTINUED | OUTPATIENT
Start: 2021-04-05 | End: 2021-04-05 | Stop reason: SDUPTHER

## 2021-04-05 RX ORDER — LIDOCAINE HYDROCHLORIDE 10 MG/ML
INJECTION, SOLUTION EPIDURAL; INFILTRATION; INTRACAUDAL; PERINEURAL PRN
Status: DISCONTINUED | OUTPATIENT
Start: 2021-04-05 | End: 2021-04-05 | Stop reason: SDUPTHER

## 2021-04-05 RX ORDER — CIPROFLOXACIN 500 MG/1
500 TABLET, FILM COATED ORAL EVERY 12 HOURS SCHEDULED
Status: DISCONTINUED | OUTPATIENT
Start: 2021-04-05 | End: 2021-04-06 | Stop reason: HOSPADM

## 2021-04-05 RX ORDER — METRONIDAZOLE 500 MG/1
500 TABLET ORAL EVERY 8 HOURS SCHEDULED
Status: DISCONTINUED | OUTPATIENT
Start: 2021-04-05 | End: 2021-04-06 | Stop reason: HOSPADM

## 2021-04-05 RX ORDER — SODIUM CHLORIDE 9 MG/ML
INJECTION, SOLUTION INTRAVENOUS CONTINUOUS
Status: DISCONTINUED | OUTPATIENT
Start: 2021-04-05 | End: 2021-04-05

## 2021-04-05 RX ADMIN — HYDROCODONE BITARTRATE AND ACETAMINOPHEN 2 TABLET: 5; 325 TABLET ORAL at 20:45

## 2021-04-05 RX ADMIN — CIPROFLOXACIN 400 MG: 2 INJECTION, SOLUTION INTRAVENOUS at 02:31

## 2021-04-05 RX ADMIN — LIDOCAINE HYDROCHLORIDE 50 MG: 10 INJECTION, SOLUTION EPIDURAL; INFILTRATION; INTRACAUDAL; PERINEURAL at 12:08

## 2021-04-05 RX ADMIN — HYDROMORPHONE HYDROCHLORIDE 0.5 MG: 1 INJECTION, SOLUTION INTRAMUSCULAR; INTRAVENOUS; SUBCUTANEOUS at 18:12

## 2021-04-05 RX ADMIN — FAMOTIDINE 20 MG: 10 INJECTION INTRAVENOUS at 09:33

## 2021-04-05 RX ADMIN — HYDROMORPHONE HYDROCHLORIDE 0.5 MG: 1 INJECTION, SOLUTION INTRAMUSCULAR; INTRAVENOUS; SUBCUTANEOUS at 07:40

## 2021-04-05 RX ADMIN — SODIUM CHLORIDE, PRESERVATIVE FREE 10 ML: 5 INJECTION INTRAVENOUS at 20:45

## 2021-04-05 RX ADMIN — GABAPENTIN 300 MG: 300 CAPSULE ORAL at 20:45

## 2021-04-05 RX ADMIN — ONDANSETRON 4 MG: 2 INJECTION INTRAMUSCULAR; INTRAVENOUS at 04:44

## 2021-04-05 RX ADMIN — PROPOFOL 50 MG: 10 INJECTION, EMULSION INTRAVENOUS at 12:48

## 2021-04-05 RX ADMIN — FAMOTIDINE 20 MG: 10 INJECTION INTRAVENOUS at 20:45

## 2021-04-05 RX ADMIN — PROPOFOL 50 MG: 10 INJECTION, EMULSION INTRAVENOUS at 12:08

## 2021-04-05 RX ADMIN — PROPOFOL 50 MG: 10 INJECTION, EMULSION INTRAVENOUS at 12:34

## 2021-04-05 RX ADMIN — APIXABAN 5 MG: 5 TABLET, FILM COATED ORAL at 20:45

## 2021-04-05 RX ADMIN — PROPOFOL 50 MG: 10 INJECTION, EMULSION INTRAVENOUS at 12:22

## 2021-04-05 RX ADMIN — METRONIDAZOLE 500 MG: 500 TABLET, FILM COATED ORAL at 20:44

## 2021-04-05 RX ADMIN — HYDROCODONE BITARTRATE AND ACETAMINOPHEN 2 TABLET: 5; 325 TABLET ORAL at 09:42

## 2021-04-05 RX ADMIN — CIPROFLOXACIN 500 MG: 500 TABLET ORAL at 20:44

## 2021-04-05 RX ADMIN — Medication 20 MG: at 14:20

## 2021-04-05 RX ADMIN — HYDROMORPHONE HYDROCHLORIDE 0.5 MG: 1 INJECTION, SOLUTION INTRAMUSCULAR; INTRAVENOUS; SUBCUTANEOUS at 21:38

## 2021-04-05 RX ADMIN — PROPOFOL 50 MG: 10 INJECTION, EMULSION INTRAVENOUS at 12:27

## 2021-04-05 RX ADMIN — AMITRIPTYLINE HYDROCHLORIDE 10 MG: 10 TABLET, FILM COATED ORAL at 20:45

## 2021-04-05 RX ADMIN — PROPOFOL 50 MG: 10 INJECTION, EMULSION INTRAVENOUS at 12:15

## 2021-04-05 RX ADMIN — PROPOFOL 50 MG: 10 INJECTION, EMULSION INTRAVENOUS at 12:40

## 2021-04-05 RX ADMIN — HYDROMORPHONE HYDROCHLORIDE 0.5 MG: 1 INJECTION, SOLUTION INTRAMUSCULAR; INTRAVENOUS; SUBCUTANEOUS at 04:38

## 2021-04-05 RX ADMIN — SODIUM CHLORIDE: 9 INJECTION, SOLUTION INTRAVENOUS at 10:56

## 2021-04-05 RX ADMIN — METRONIDAZOLE 500 MG: 500 TABLET, FILM COATED ORAL at 16:25

## 2021-04-05 RX ADMIN — HYDROMORPHONE HYDROCHLORIDE 0.5 MG: 1 INJECTION, SOLUTION INTRAMUSCULAR; INTRAVENOUS; SUBCUTANEOUS at 14:08

## 2021-04-05 RX ADMIN — DESMOPRESSIN ACETATE 40 MG: 0.2 TABLET ORAL at 20:45

## 2021-04-05 RX ADMIN — CIPROFLOXACIN 400 MG: 2 INJECTION, SOLUTION INTRAVENOUS at 14:09

## 2021-04-05 RX ADMIN — HYDROCODONE BITARTRATE AND ACETAMINOPHEN 2 TABLET: 5; 325 TABLET ORAL at 16:25

## 2021-04-05 ASSESSMENT — PAIN DESCRIPTION - DESCRIPTORS: DESCRIPTORS: SHARP

## 2021-04-05 ASSESSMENT — PAIN SCALES - GENERAL
PAINLEVEL_OUTOF10: 8
PAINLEVEL_OUTOF10: 8
PAINLEVEL_OUTOF10: 7
PAINLEVEL_OUTOF10: 7
PAINLEVEL_OUTOF10: 8
PAINLEVEL_OUTOF10: 7

## 2021-04-05 ASSESSMENT — COPD QUESTIONNAIRES: CAT_SEVERITY: MODERATE

## 2021-04-05 ASSESSMENT — ENCOUNTER SYMPTOMS: SHORTNESS OF BREATH: 1

## 2021-04-05 ASSESSMENT — LIFESTYLE VARIABLES: SMOKING_STATUS: 1

## 2021-04-05 ASSESSMENT — PAIN DESCRIPTION - PAIN TYPE: TYPE: CHRONIC PAIN

## 2021-04-05 NOTE — PROGRESS NOTES
[facility]  Office: [address], DO, Darnell Kumarhardt, DO, Petey Morejon, DO, Ronny Real Blood, DO, Joe Villarreal MD, Maksim Pickard MD, Natalia Null MD, Herman Still MD, Stacie Duran MD, Brandy Brar MD, Mohan Coronado MD, Shira Pinto MD, Mary Whiting, DO, Mary Wood MD, Donald Puentes DO, Bc Medrano MD,  Tamie Chauhan, DO, Nohemi Merida MD, Jose Juan Foote MD, Latosha Dwyer MD, Emma Abreu MD, Matilde Sloan, Adithya Sinclair, CNP, Eddie Pool, CNP, Deja Shefifeld, CNS, Zafar Mallory, CNP, Thompson Lombard, CNP, Davin Montana, CNP, Tamiko Vanegas, CNP, Elissa Spicer, CNP, Robson Howe PA-C, Jacinto Cueva, DUNG, Dell Driscoll, CNP, Will Nobles, CNP, Pennie Hopkins, CNP, Ivonne Ku, CNP, Dong Scott, CNP, Alan Harris, [address] [place] [address]    Progress Note    4/5/2021    3:37 PM    Name:   Gerhardt Slain  MRN:     [id number]     Acct:      [de-identified]   Room:   010/1846-07   Day:  1  Admit Date:  4/3/2021  7:10 PM    PCP:   No primary care provider on file. Code Status:  Full Code    Subjective:     C/C:   Chief Complaint   Patient presents with    Flank Pain     right side      Interval History Status: improved. Patient seen and examined at bedside. Patient doing well. Admits to some abdominal pain but no nausea vomiting or diarrhea. Patient does have an appetite. Patient underwent EGD and EUS today and an FNA was done of the pancreatic mass. EGD did not show any abnormalities    Brief History: This is a 78-year-old female who initially presented for abdominal pain found to have a pancreatic mass that was evaluated by gastroenterology who did an EGD and EUS with biopsies of the sample. Patient was also treated for infectious colitis.   Patient's abdominal pain did improve and she was stable for discharge however due to being homeless, discharge was delayed until 4/6/2021    Review of Systems:     Constitutional:  negative for chills, fevers, sweats  Respiratory:  negative for cough, dyspnea on exertion, shortness of breath, wheezing  Cardiovascular:  negative for chest pain, chest pressure/discomfort, lower extremity edema, palpitations  Gastrointestinal: Midst of some abdominal discomfort no diarrhea or vomiting  Neurological:  negative for dizziness, headache    Medications: Allergies:     Allergies   Allergen Reactions    Aspirin Itching    Keflet [Cephalexin]      Tolerated penicillin V 12/13, tolerates cephalexin 6/14       Current Meds:   Scheduled Meds:    amitriptyline  10 mg Oral Nightly    apixaban  5 mg Oral BID    atorvastatin  40 mg Oral Nightly    carvedilol  6.25 mg Oral BID WC    clopidogrel  75 mg Oral Daily    ferrous sulfate  325 mg Oral Daily    folic acid  1 mg Oral Daily    gabapentin  300 mg Oral TID    lisinopril  10 mg Oral Daily    famotidine (PEPCID) injection  20 mg Intravenous BID     Continuous Infusions:    sodium chloride       PRN Meds: melatonin, sodium chloride flush, sodium chloride, potassium chloride, magnesium sulfate, acetaminophen, HYDROcodone 5 mg - acetaminophen **OR** HYDROcodone 5 mg - acetaminophen, HYDROmorphone **OR** HYDROmorphone, promethazine **OR** ondansetron, labetalol    Data:     Past Medical History:   has a past medical history of Appendicitis, Cerebral artery occlusion with cerebral infarction ([facility] [address] CHF (congestive heart failure) ([facility]), Chronic respiratory failure with hypoxia and hypercapnia (HCC), Chronic rhinitis, Cigarette smoker, COPD (chronic obstructive pulmonary disease) ([facility] [address] Depression, Dysphagia, Essential hypertension, GERD (gastroesophageal reflux disease), Heart failure, diastolic, with acute decompensation (HCC), Hepatitis C, chronic (HCC), History of smoking at least 1 pack per day for at least 30 years, Hyperlipidemia, Hypertension, Migraine, Moderate COPD (chronic obstructive pulmonary disease) ([facility] [address] Multiple transfusions, Neurogenic dysphagia, Osteoarthritis, Pneumonia, Scoliosis, and Substance abuse ([facility] [address] Social History:   reports that she quit smoking about 2 years ago. Her smoking use included cigarettes. She started smoking about 51 years ago. She has a 40.00 pack-year smoking history. She has never used smokeless tobacco. She reports that she does not drink alcohol or use drugs. Family History:   Family History   Problem Relation Age of Onset    Breast Cancer Mother     Heart Disease Father        Vitals:  BP (!) 170/79   Pulse 87   Temp 97.8 °F (36.6 °C) (Infrared)   Resp 17   Ht 4' 11\" (1.499 m)   Wt 85 lb (38.6 kg)   LMP 2015   SpO2 98%   BMI 17.17 kg/m²   Temp (24hrs), Av.4 °F (36.9 °C), Min:97.8 °F (36.6 °C), Max:98.8 °F (37.1 °C)    No results for input(s): POCGLU in the last 72 hours. I/O (24Hr):     Intake/Output Summary (Last 24 hours) at 2021 1537  Last data filed at 2021 1255  Gross per 24 hour   Intake 1485 ml   Output    Net 1485 ml       Labs:  Hematology:  Recent Labs     21  0632   WBC 7.0 4.6  --    RBC 5.37* 3.65*  --    HGB 17.5* 11.9  --    HCT 52.2* 35.5*  --    MCV 97.2 97.3  --    MCH 32.6 32.6  --    MCHC 33.5 33.5  --    RDW 14.5* 14.2  --    PLT See Reflexed IPF Result 410  --    MPV NOT REPORTED 9.2  --    INR  --   --  1.0     Chemistry:  Recent Labs     21  0632   * 137  --    K 4.8 3.3*  --    CL 96* 106  --    CO2 18* 19*  --    GLUCOSE 91 82  --    BUN 12 20  --    CREATININE 0.56 0.64  --    MG  --  2.1  --    ANIONGAP 19* 12  --    LABGLOM >60 >60  --    GFRAA >60 >60  --    CALCIUM 10.6* 8.4*  --    CAION  --   --  1.11*   PROBNP 742*  --   --    TROPHS 9  --   --      Recent Labs     21  1933 04/04/21  1906   PROT 10.4* 6.5   LABALBU 5.1 3.6   AST 66* 92*   ALT 51* 61*   ALKPHOS 111* 80   BILITOT 0.84 1.26*   LIPASE 44  -- ABG:  Lab Results   Component Value Date    POCPH 7.366 01/08/2019    POCPCO2 51.0 01/08/2019    POCPO2 65.9 01/08/2019    POCHCO3 29.2 01/08/2019    NBEA NOT REPORTED 01/08/2019    PBEA 3 01/08/2019    SRB0NMI 31 01/08/2019    GZVU7ZAS 92 01/08/2019    FIO2 NOT REPORTED 09/04/2019     Lab Results   Component Value Date/Time    SPECIAL NOT REPORTED 04/04/2021 12:09 AM     Lab Results   Component Value Date/Time    CULTURE NO SIGNIFICANT GROWTH 04/04/2021 12:09 AM       Radiology:  Ct Abdomen Pelvis W Iv Contrast Additional Contrast? None    Result Date: 4/3/2021  Thickening of the wall of the ascending colon with mild surrounding fatty stranding is likely suggestive of infectious/inflammatory colitis. Apparent hyperenhancement of the mucosa of the stomach. Finding is nonspecific but may be suggestive of gastritis. 2.3 x 1.9 cm peripherally enhancing lesion appears to arise from the mid body of the pancreas. The peripheral aspect of this lesion has a more solid appearance in comparison to prior exam. There is also associated mild dilatation of pancreatic duct. 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 For further evaluation of this finding contrast enhance MRCP examination is recommended. Severe degenerative changes of the left hip joint with bone-on-bone appearance.        Physical Examination:        General appearance:  alert, cooperative and no distress  Mental Status:  oriented to person, place and time and normal affect  Lungs:  clear to auscultation bilaterally, normal effort  Heart:  regular rate and rhythm, no murmur  Abdomen:  soft, nontender, nondistended, normal bowel sounds, no masses, hepatomegaly, splenomegaly  Extremities:  no edema, redness, tenderness in the calves  Skin:  no gross lesions, rashes, induration    Assessment:        Hospital Problems           Last Modified POA    * (Principal) Pancreatic mass 4/4/2021 Yes    Essential hypertension 4/4/2021 Yes Right flank pain 4/4/2021 Yes    COPD without exacerbation ([facility] [address] 4/4/2021 Yes    History of hepatitis C (Chronic) 4/4/2021 Yes    Overview Signed 8/11/2019  2:24 PM by Larissa Arreola DO     Previously treated at [facility]         Chronic diastolic heart failure ([facility] [address] 4/4/2021 Yes    Homelessness 4/4/2021 Yes    Colitis 4/4/2021 Yes          Plan:        Pancreatic mass: Underwent EGD and EUS for 4/5/21. EGD did not show any abnormalities. Pancreatic mass was biopsied which showed regular cellularity. Patient will need to follow-up for final pathologic results. Infectious colitis: Transition off of IV antibiotics. Continue Cipro/Flagyl for 3 days  Acute hypokalemia: Replace potassium  Homeless: Will live with her brother, social work consulted      Disposition: Patient medically stable for discharge today however she is homeless. She does have a brother that she  will be able to live with until more permanent housing is found.  She has been unable to get in contact with her brother today due to him being in dialysis so unfortunately discharge will be delayed      Marie Sloan DO  4/5/2021  3:37 PM

## 2021-04-05 NOTE — ANESTHESIA PRE PROCEDURE
Department of Anesthesiology  Preprocedure Note       Name:  Mikayla Vela   Age:  77 y.o.  :  1955                                          MRN:  [id number]         Date:  2021      Surgeon: Claudio Downs):  David Oseguera MD    Procedure: Procedure(s):  ENDOSCOPIC ULTRASOUND    Medications prior to admission:   Prior to Admission medications    Medication Sig Start Date End Date Taking? Authorizing Provider   gabapentin (NEURONTIN) 300 MG capsule Take 1 capsule by mouth 3 times daily for 30 days. 3/16/21 4/15/21 Yes Prerna Garcia MD   traMADol (ULTRAM) 50 MG tablet Take 1 tablet by mouth 2 times daily as needed for Pain for up to 30 days.  3/16/21 4/15/21 Yes Prerna Garcia MD   lisinopril (PRINIVIL;ZESTRIL) 10 MG tablet Take 1 tablet by mouth daily 21  Yes Sanjuana Maya MD   amitriptyline (ELAVIL) 10 MG tablet Take 1 tablet by mouth nightly 20  Yes Maria Esther Magallanes DO   carvedilol (COREG) 6.25 MG tablet Take 1 tablet by mouth 2 times daily (with meals) 19  Yes Kulwinder Mcclelland MD   melatonin 1 MG tablet Take 1 tablet by mouth nightly as needed for Sleep 19  Yes Chau Saldivar MD   ipratropium-albuterol (DUONEB) 0.5-2.5 (3) MG/3ML SOLN nebulizer solution Inhale 3 mLs into the lungs every 4 hours as needed for Shortness of Breath 19  Yes Chau Saldivar MD   mometasone-formoterol [facility]) 100-5 MCG/ACT inhaler Inhale 2 puffs into the lungs 2 times daily 18  Yes Ranee Castleman, MD   butalbital-acetaminophen-caffeine (FIORICET, ESGIC) -40 MG per tablet Take 1 tablet by mouth every 12 hours for 5 doses 3/28/21 3/31/21  Jalen Pal MD   albuterol sulfate HFA (PROVENTIL HFA) 108 (90 Base) MCG/ACT inhaler Inhale 1-2 puffs into the lungs once for 1 dose 3/13/21 3/13/21  Tess Clipper, DO   Elastic Bandages & Supports (LUMBAR BACK BRACE/SUPPORT PAD) MISC 1 Units by Does not apply route daily 2/10/21 2/10/22  Prerna Garcia MD   amLODIPine (NORVASC) 10 MG tablet Take 1 tablet by mouth daily 2/9/21 2/9/21  Jesus Obrien MD   mirtazapine (REMERON) 30 MG tablet  11/3/20   Historical Provider, MD   Sennosides (SENNA) 8.6 MG CAPS 8.6 capsules 8/2/20   Historical Provider, MD   apixaban (ELIQUIS) 5 MG TABS tablet Take 1 tablet by mouth 2 times daily 8/7/20   Sridhar Majano MD   atorvastatin (LIPITOR) 40 MG tablet Take 1 tablet by mouth nightly 1/31/20   Elise Boots, DO   clopidogrel (PLAVIX) 75 MG tablet Take 1 tablet by mouth daily 2/1/20   Elise Boots, DO   Misc.  Devices [facility]) MISC 1 Device by Does not apply route once for 1 dose 8/5/19 8/5/19  Dat Mack PA-C   ferrous sulfate (MELISSA-HAO) 325 (65 Fe) MG tablet Take 1 tablet by mouth daily 2/7/19   Serge Cooper MD   pantoprazole (PROTONIX) 40 MG tablet Take 1 tablet by mouth 2 times daily (before meals)  Patient not taking: Reported on 12/8/2020 11/13/18   Eusebia Latif MD   folic acid (FOLVITE) 1 MG tablet Take 1 tablet by mouth daily 10/13/18   Lazarus Hun, MD   vitamin B-1 100 MG tablet Take 1 tablet by mouth daily 10/13/18   Lazarus Hun, MD   vitamin B-12 250 MCG tablet Take 1 tablet by mouth daily 10/13/18   Lazarus Hun, MD   Multiple Vitamins-Minerals (THERAPEUTIC MULTIVITAMIN-MINERALS) tablet Take 1 tablet by mouth daily 8/12/18   Saint Dadds, MD       Current medications:    Current Facility-Administered Medications   Medication Dose Route Frequency Provider Last Rate Last Admin    0.9 % sodium chloride infusion   Intravenous Continuous Miroslava Yancey MD 20 mL/hr at 04/05/21 1056 New Bag at 04/05/21 1056    [Held by provider] amitriptyline (ELAVIL) tablet 10 mg  10 mg Oral Nightly Camillia Monday, APRN - CNP        [Held by provider] apixaban [facility]) tablet 5 mg  5 mg Oral BID Camillia Monday, APRN - CNP        [Held by provider] atorvastatin (LIPITOR) tablet 40 mg  40 mg Oral Nightly Camillia Monday, APRN - CNP        [Held by provider] carvedilol Carine Plants, APRN - CNP   0.5 mg at 04/05/21 0740    promethazine (PHENERGAN) tablet 12.5 mg  12.5 mg Oral Q6H PRN Carine Plants, APRN - CNP        Or    [facility]ron [facility] injection 4 mg  4 mg Intravenous Q6H PRN Carine Plants, APRN - CNP   4 mg at 04/05/21 0444    famotidine (PEPCID) injection 20 mg  20 mg Intravenous BID Carine Plants, APRN - CNP   20 mg at 04/05/21 0933    ciprofloxacin (CIPRO) IVPB 400 mg  400 mg Intravenous Q12H Carine Plants, APRN - CNP   Stopped at 04/05/21 0331    labetalol (NORMODYNE;TRANDATE) injection 20 mg  20 mg Intravenous Q2H PRN Gabrielle Hickey, APRN - CNP   20 mg at 04/04/21 0834    enoxaparin (LOVENOX) injection 30 mg  30 mg Subcutaneous Daily Gabrielle Hickey, APRN - CNP   30 mg at 04/04/21 0827    lactated ringers infusion   Intravenous Continuous Mohammad ANGELIC Bacon, DO 75 mL/hr at 04/04/21 1858 New Bag at 04/04/21 1858       Allergies:     Allergies   Allergen Reactions    Aspirin Itching    Keflet [Cephalexin]      Tolerated penicillin V 12/13, tolerates cephalexin 6/14       Problem List:    Patient Active Problem List   Diagnosis Code    Essential hypertension I10    Hepatitis C B19.20    History of substance abuse ([address] F19.11    Chronic heart failure with reduced ejection fraction and diastolic dysfunction (HCC) O94.84    Alcoholic cirrhosis of liver without ascites ([address] K70.30    PUD (peptic ulcer disease) K27.9    Anemia D64.9    Duodenal ulcer with hemorrhage K26.4    Psychosis, intermittent requring antipsychotic meds F29    Opiate withdrawal ([address] F11.23    Pneumonia J18.9    Pancreas head lesion S36.200A    Polysubstance abuse ([address] F19.10    Right flank pain R10.9    Avascular necrosis of bone of right hip (HCC) M87.051    Closed fracture of head of right femur ([address] S72.051A    COPD without exacerbation ([address] J44.9    History of hepatitis C Z86.19    Thrombocytosis (HCC) D47.3    APPENDECTOMY  2017     SECTION      GASTRIC BYPASS SURGERY      HIP SURGERY Right 2019    HIP TOTAL ARTHROPLASTY ANTERIOR APPROACH - MEDACTA,  C-ARM, (Right )    LAPAROSCOPIC APPENDECTOMY N/A 3/27/2017    APPENDECTOMY LAPAROSCOPIC performed by Grabiel Melissa MD at [address] [place] [address]      hgsil    MEDICATION INJECTION Left 3/25/2021    STEROID INJECTION LEFT HIP  WITH FLUORO performed by Karen Harris MD at [address] [facility] N/A 11/10/2018    COLONOSCOPY WITH BIOPSY performed by Kameron Kahn MD at [facility]    [place] EGD TRANSORAL BIOPSY SINGLE/MULTIPLE N/A 2018    EGD ESOPHAGOGASTRODUODENOSCOPY performed by Jose Alejandro Cottrell MD at [address] [facility] Right 2019    HIP TOTAL ARTHROPLASTY ANTERIOR APPROACH - MEDACTA,  C-ARM, performed by Ervin Smith DO at [place] [address]      oesophagitis, candidiasis    UPPER GASTROINTESTINAL ENDOSCOPY  2018    UPPER GASTROINTESTINAL ENDOSCOPY N/A 2018    EGD CONTROL HEMORRHAGE performed by Jose Alejandro Cottrell MD at [place] [address] History:    Social History     Tobacco Use    Smoking status: Former Smoker     Packs/day: 1.00     Years: 40.00     Pack years: 40.00     Types: Cigarettes     Start date:      Quit date: 2018     Years since quittin.4    Smokeless tobacco: Never Used   Substance Use Topics    Alcohol use: No     Alcohol/week: 0.0 standard drinks                                Counseling given: Not Answered      Vital Signs (Current):   Vitals:    21 2000 21 0530 21 0826 21 1048   BP: (!) 149/81 (!) 152/81 (!) 145/74 (!) 159/72   Pulse: 70 67 67 62   Resp: 16 16 17 12   Temp: 36.9 °C (98.4 °F) 37 °C (98.6 °F) 37.1 °C (98.8 °F) 37 °C (98.6 °F)   TempSrc: Oral Oral Oral Infrared   SpO2:   97% 98%   Weight:       Height:                                                  BP Readings from Last 3 Encounters:   04/05/21 (!) 159/72   03/29/21 (!) 154/101   03/28/21 (!) 179/102       NPO Status: Time of last liquid consumption: 2200                        Time of last solid consumption: 1300(patient states she vomited after eating)                        Date of last liquid consumption: 04/04/21                        Date of last solid food consumption: 04/02/21    BMI:   Wt Readings from Last 3 Encounters:   04/03/21 85 lb (38.6 kg)   03/29/21 85 lb (38.6 kg)   03/22/21 85 lb (38.6 kg)     Body mass index is 17.17 kg/m². CBC:   Lab Results   Component Value Date    WBC 4.6 04/04/2021    RBC 3.65 04/04/2021    RBC 4.15 05/04/2012    HGB 11.9 04/04/2021    HCT 35.5 04/04/2021    MCV 97.3 04/04/2021    RDW 14.2 04/04/2021     04/04/2021     05/04/2012       CMP:   Lab Results   Component Value Date     04/04/2021    K 3.3 04/04/2021     04/04/2021    CO2 19 04/04/2021    BUN 20 04/04/2021    CREATININE 0.64 04/04/2021    GFRAA >60 04/04/2021    LABGLOM >60 04/04/2021    GLUCOSE 82 04/04/2021    GLUCOSE 76 05/04/2012    PROT 6.5 04/04/2021    CALCIUM 8.4 04/04/2021    BILITOT 1.26 04/04/2021    ALKPHOS 80 04/04/2021    AST 92 04/04/2021    ALT 61 04/04/2021       POC Tests: No results for input(s): POCGLU, POCNA, POCK, POCCL, POCBUN, POCHEMO, POCHCT in the last 72 hours.     Coags:   Lab Results   Component Value Date    PROTIME 10.9 04/05/2021    INR 1.0 04/05/2021    APTT >120.0 07/31/2020       HCG (If Applicable): No results found for: PREGTESTUR, PREGSERUM, HCG, HCGQUANT     ABGs: No results found for: PHART, PO2ART, CEQ1FVX, NNE3BAZ, BEART, P1DQASLT     Type & Screen (If Applicable):  No results found for: LABABO, LABRH    Drug/Infectious Status (If Applicable):  Lab Results   Component Value Date    HEPCAB REACTIVE 10/09/2017       COVID-19 Screening (If Applicable):   Lab Results   Component Value Date    COVID19 Not Detected 04/04/2021           Anesthesia Evaluation  Patient summary reviewed and Nursing notes reviewed no history of anesthetic complications:   Airway: Mallampati: II  TM distance: >3 FB   Neck ROM: full  Mouth opening: > = 3 FB Dental: normal exam         Pulmonary:   (+) pneumonia: resolved,  COPD: moderate,  shortness of breath: chronic,  decreased breath sounds,  current smoker          Patient did not smoke on day of surgery. Cardiovascular:  Exercise tolerance: good (>4 METS),   (+) hypertension: moderate, CHF: diastolic, hyperlipidemia        Rhythm: regular  Rate: normal           Beta Blocker:  Dose within 24 Hrs         Neuro/Psych:   (+) CVA: residual symptoms, headaches: migraine headaches, psychiatric history: stable with treatmentdepression/anxiety             GI/Hepatic/Renal:   (+) GERD:, PUD, hepatitis: C, liver disease:, renal disease: ARF,           Endo/Other:    (+) blood dyscrasia: anemia, arthritis: OA., electrolyte abnormalities, . Pt had no PAT visit       Abdominal:       Abdomen: soft. Vascular: negative vascular ROS. Anesthesia Plan      MAC     ASA 3 - emergent       Induction: intravenous. MIPS: Postoperative opioids intended and Prophylactic antiemetics administered. Anesthetic plan and risks discussed with patient. Plan discussed with attending, surgical team and CRNA.     Attending anesthesiologist reviewed and agrees with Pre Eval content              JAYDON Mccord - CRNA   4/5/2021

## 2021-04-05 NOTE — CARE COORDINATION
Consult re: pt is recently homeless  Met with pt this date was alert and oriented  Pt states she has been staying with her brother for the past 2 days. Prior was living with different people that she knows . Also stayed at the [facility].  Pt is presently homeless. Pt states her casemgr  Olga Avilez at [facility]) has taken her to place applications at several apartment complexes and is awaiting their response. Pt also reports her casemgr is a assisting her in obtaining a new picture ID and copy of her birth certificate. Provided a subsidized housing list.  Pt plans to return to her brother home after d/c.   Insurance is [place] [employer]
get better housing          Electronically signed by Kai Clark RN on 4/5/21 at 3:22 PM EDT

## 2021-04-05 NOTE — PROGRESS NOTES
Occupational Therapy Not Seen Note    DATE: 2021  Name: Macy Scott  : 1955  MRN: [id number]    Patient not available for Occupational Therapy due to:    Patient Declined: Pt stated she was too hungry to participate in therapy this date. Pt's nurse is getting Pt a boxed lunch.      Next Scheduled Treatment: 21    Electronically signed by GARLAND Wilcox OTR/L on 2021 at 2:56 PM

## 2021-04-05 NOTE — PROGRESS NOTES
Physical Therapy    DATE: 2021    NAME: Adri Riddle  MRN: [id number]   : 1955      Patient not seen this date for Physical Therapy due to:    Testing: Pt off floor for EUS.  Will check back in the PM as time allows      Electronically signed by Mishel Landry PTA on 2021 at 10:57 AM

## 2021-04-05 NOTE — PROGRESS NOTES
Wounds:  None       Current Nutrition Therapies:    Diet NPO, After Midnight Exceptions are: Sips of Water with Meds    Anthropometric Measures:  · Height: 4' 11\" (149.9 cm)  · Current Body Weight: 85 lb (38.6 kg)   · Usual Body Weight: 97 lb 14.2 oz (44.4 kg)(7/30/20 bedscale per chart review)     · Ideal Body Weight: 95 lbs; % Ideal Body Weight 89.5 %   · BMI: 17.2  · BMI Categories: Underweight (BMI less than 18.5)       Nutrition Diagnosis:   · Inadequate oral intake related to (decreased appetite, current medical condition) as evidenced by NPO or clear liquid status due to medical condition, poor intake prior to admission, vomiting, nausea    Nutrition Interventions:   Food and/or Nutrient Delivery:  Continue NPO(Monitor for restart of oral diet. Will provide ONS with meals as able.)  Nutrition Education/Counseling:  No recommendation at this time   Coordination of Nutrition Care:  Continue to monitor while inpatient    Goals:  Oral intakes to meet at % of estimated nutrition needs.        Nutrition Monitoring and Evaluation:   Food/Nutrient Intake Outcomes:  Diet Advancement/Tolerance  Physical Signs/Symptoms Outcomes:  Biochemical Data, GI Status, Nausea or Vomiting, Nutrition Focused Physical Findings, Skin, Weight    Electronically signed by Anton Russell RD, LD on 4/5/21 at 1:14 PM EDT    Contact: [phone]

## 2021-04-05 NOTE — PROGRESS NOTES
Occupational Therapy Not Seen Note    DATE: 2021  Name: Chaim Hutton  : 1955  MRN: [id number]    Patient not available for Occupational Therapy due to:    Testing: Pt off floor for EUS. Next Scheduled Treatment: 21 or check back as able.     Electronically signed by GARLAND Monroe OTR/L on 2021 at 10:52 AM

## 2021-04-05 NOTE — OP NOTE
adequate cellularity. The rest of the pancreas otherwise appeared normal. Normal PD. No signs of chronic pancreatitis. CBD: Normal, no stones, sludge. CBD diameter: 5 mm. Left adrenal: Normal.   Ampulla: Normal.  Left lobe of liver: normal.   Lymphadenopathy: No pathologic lymphadenopathy seen in upper abdomen. Recommendations: Regular diet. Okay to discharge from GI standpoint with follow-up in GI office.     Electronically signed by Richie العلي MD, FACG  on 4/5/2021 at 1:00 PM

## 2021-04-06 VITALS
HEART RATE: 71 BPM | DIASTOLIC BLOOD PRESSURE: 85 MMHG | OXYGEN SATURATION: 95 % | SYSTOLIC BLOOD PRESSURE: 145 MMHG | TEMPERATURE: 98.7 F | BODY MASS INDEX: 19.05 KG/M2 | HEIGHT: 59 IN | WEIGHT: 94.5 LBS | RESPIRATION RATE: 16 BRPM

## 2021-04-06 PROBLEM — R10.13 EPIGASTRIC PAIN: Status: ACTIVE | Noted: 2021-04-06

## 2021-04-06 LAB
-: NORMAL
ALBUMIN SERPL-MCNC: 3.3 G/DL (ref 3.5–5.2)
ALBUMIN SERPL-MCNC: 3.3 G/DL (ref 3.5–5.2)
ALBUMIN/GLOBULIN RATIO: 1 (ref 1–2.5)
ALP BLD-CCNC: 69 U/L (ref 35–104)
ALT SERPL-CCNC: 37 U/L (ref 5–33)
ANION GAP SERPL CALCULATED.3IONS-SCNC: 9 MMOL/L (ref 9–17)
AST SERPL-CCNC: 52 U/L
BILIRUB SERPL-MCNC: 0.88 MG/DL (ref 0.3–1.2)
BILIRUBIN DIRECT: 0.1 MG/DL
BILIRUBIN, INDIRECT: 0.78 MG/DL (ref 0–1)
BUN BLDV-MCNC: 12 MG/DL (ref 8–23)
BUN/CREAT BLD: ABNORMAL (ref 9–20)
CALCIUM SERPL-MCNC: 8.7 MG/DL (ref 8.6–10.4)
CHLORIDE BLD-SCNC: 109 MMOL/L (ref 98–107)
CO2: 19 MMOL/L (ref 20–31)
CREAT SERPL-MCNC: 0.54 MG/DL (ref 0.5–0.9)
GFR AFRICAN AMERICAN: >60 ML/MIN
GFR NON-AFRICAN AMERICAN: >60 ML/MIN
GFR SERPL CREATININE-BSD FRML MDRD: ABNORMAL ML/MIN/{1.73_M2}
GFR SERPL CREATININE-BSD FRML MDRD: ABNORMAL ML/MIN/{1.73_M2}
GLOBULIN: ABNORMAL G/DL (ref 1.5–3.8)
GLUCOSE BLD-MCNC: 87 MG/DL (ref 70–99)
HCT VFR BLD CALC: 36.9 % (ref 36.3–47.1)
HEMOGLOBIN: 12 G/DL (ref 11.9–15.1)
MAGNESIUM: 1.8 MG/DL (ref 1.6–2.6)
MCH RBC QN AUTO: 32.3 PG (ref 25.2–33.5)
MCHC RBC AUTO-ENTMCNC: 32.5 G/DL (ref 28.4–34.8)
MCV RBC AUTO: 99.5 FL (ref 82.6–102.9)
NRBC AUTOMATED: 0 PER 100 WBC
PDW BLD-RTO: 13.8 % (ref 11.8–14.4)
PHOSPHORUS: 3 MG/DL (ref 2.6–4.5)
PLATELET # BLD: ABNORMAL K/UL (ref 138–453)
PMV BLD AUTO: ABNORMAL FL (ref 8.1–13.5)
POTASSIUM SERPL-SCNC: 4.5 MMOL/L (ref 3.7–5.3)
RBC # BLD: 3.71 M/UL (ref 3.95–5.11)
REASON FOR REJECTION: NORMAL
SODIUM BLD-SCNC: 137 MMOL/L (ref 135–144)
SURGICAL PATHOLOGY REPORT: NORMAL
TOTAL PROTEIN: 6.6 G/DL (ref 6.4–8.3)
WBC # BLD: 4.8 K/UL (ref 3.5–11.3)
ZZ NTE CLEAN UP: ORDERED TEST: NORMAL
ZZ NTE WITH NAME CLEAN UP: SPECIMEN SOURCE: NORMAL

## 2021-04-06 PROCEDURE — 6370000000 HC RX 637 (ALT 250 FOR IP): Performed by: INTERNAL MEDICINE

## 2021-04-06 PROCEDURE — 80076 HEPATIC FUNCTION PANEL: CPT

## 2021-04-06 PROCEDURE — 85027 COMPLETE CBC AUTOMATED: CPT

## 2021-04-06 PROCEDURE — 80069 RENAL FUNCTION PANEL: CPT

## 2021-04-06 PROCEDURE — 2580000003 HC RX 258: Performed by: INTERNAL MEDICINE

## 2021-04-06 PROCEDURE — 84100 ASSAY OF PHOSPHORUS: CPT

## 2021-04-06 PROCEDURE — 83735 ASSAY OF MAGNESIUM: CPT

## 2021-04-06 PROCEDURE — 6370000000 HC RX 637 (ALT 250 FOR IP): Performed by: NURSE PRACTITIONER

## 2021-04-06 PROCEDURE — 99239 HOSP IP/OBS DSCHRG MGMT >30: CPT | Performed by: INTERNAL MEDICINE

## 2021-04-06 PROCEDURE — 80053 COMPREHEN METABOLIC PANEL: CPT

## 2021-04-06 PROCEDURE — 82248 BILIRUBIN DIRECT: CPT

## 2021-04-06 PROCEDURE — 2500000003 HC RX 250 WO HCPCS: Performed by: INTERNAL MEDICINE

## 2021-04-06 PROCEDURE — 6360000002 HC RX W HCPCS: Performed by: INTERNAL MEDICINE

## 2021-04-06 PROCEDURE — 99232 SBSQ HOSP IP/OBS MODERATE 35: CPT | Performed by: INTERNAL MEDICINE

## 2021-04-06 PROCEDURE — 36415 COLL VENOUS BLD VENIPUNCTURE: CPT

## 2021-04-06 RX ORDER — FAMOTIDINE 20 MG/1
20 TABLET, FILM COATED ORAL 2 TIMES DAILY
Qty: 60 TABLET | Refills: 3 | Status: SHIPPED | OUTPATIENT
Start: 2021-04-06 | End: 2021-08-18

## 2021-04-06 RX ORDER — LISINOPRIL 10 MG/1
10 TABLET ORAL DAILY
Qty: 30 TABLET | Refills: 0 | Status: ON HOLD | OUTPATIENT
Start: 2021-04-06 | End: 2022-04-21

## 2021-04-06 RX ORDER — MIRTAZAPINE 30 MG/1
30 TABLET, FILM COATED ORAL NIGHTLY
Qty: 30 TABLET | Refills: 3 | Status: SHIPPED | OUTPATIENT
Start: 2021-04-06 | End: 2021-08-18

## 2021-04-06 RX ORDER — CLOPIDOGREL BISULFATE 75 MG/1
75 TABLET ORAL DAILY
Qty: 30 TABLET | Refills: 3 | Status: ON HOLD | OUTPATIENT
Start: 2021-04-06 | End: 2022-04-24 | Stop reason: HOSPADM

## 2021-04-06 RX ORDER — FAMOTIDINE 20 MG/1
20 TABLET, FILM COATED ORAL 2 TIMES DAILY
Status: DISCONTINUED | OUTPATIENT
Start: 2021-04-06 | End: 2021-04-06

## 2021-04-06 RX ORDER — METRONIDAZOLE 500 MG/1
500 TABLET ORAL EVERY 8 HOURS SCHEDULED
Qty: 30 TABLET | Refills: 0 | Status: SHIPPED | OUTPATIENT
Start: 2021-04-06 | End: 2021-04-16

## 2021-04-06 RX ORDER — FAMOTIDINE 20 MG/1
20 TABLET, FILM COATED ORAL 2 TIMES DAILY
Status: DISCONTINUED | OUTPATIENT
Start: 2021-04-06 | End: 2021-04-06 | Stop reason: HOSPADM

## 2021-04-06 RX ORDER — ALBUTEROL SULFATE 90 UG/1
1-2 AEROSOL, METERED RESPIRATORY (INHALATION) ONCE
Qty: 1 INHALER | Refills: 0 | Status: SHIPPED | OUTPATIENT
Start: 2021-04-06 | End: 2021-09-27

## 2021-04-06 RX ORDER — GABAPENTIN 300 MG/1
300 CAPSULE ORAL 3 TIMES DAILY
Qty: 90 CAPSULE | Refills: 0 | Status: SHIPPED | OUTPATIENT
Start: 2021-04-06 | End: 2021-08-18

## 2021-04-06 RX ORDER — FERROUS SULFATE 325(65) MG
325 TABLET ORAL DAILY
Qty: 30 TABLET | Refills: 0 | Status: ON HOLD | OUTPATIENT
Start: 2021-04-06 | End: 2021-07-12

## 2021-04-06 RX ORDER — ATORVASTATIN CALCIUM 40 MG/1
40 TABLET, FILM COATED ORAL NIGHTLY
Qty: 30 TABLET | Refills: 3 | Status: SHIPPED | OUTPATIENT
Start: 2021-04-06

## 2021-04-06 RX ORDER — CIPROFLOXACIN 500 MG/1
500 TABLET, FILM COATED ORAL EVERY 12 HOURS SCHEDULED
Qty: 20 TABLET | Refills: 0 | Status: SHIPPED | OUTPATIENT
Start: 2021-04-06 | End: 2021-04-16

## 2021-04-06 RX ORDER — AMITRIPTYLINE HYDROCHLORIDE 10 MG/1
10 TABLET, FILM COATED ORAL NIGHTLY
Qty: 30 TABLET | Refills: 3 | Status: SHIPPED | OUTPATIENT
Start: 2021-04-06

## 2021-04-06 RX ORDER — CARVEDILOL 6.25 MG/1
6.25 TABLET ORAL 2 TIMES DAILY WITH MEALS
Qty: 60 TABLET | Refills: 3 | Status: ON HOLD | OUTPATIENT
Start: 2021-04-06 | End: 2022-04-24 | Stop reason: HOSPADM

## 2021-04-06 RX ADMIN — HYDROCODONE BITARTRATE AND ACETAMINOPHEN 2 TABLET: 5; 325 TABLET ORAL at 13:11

## 2021-04-06 RX ADMIN — HYDROMORPHONE HYDROCHLORIDE 0.5 MG: 1 INJECTION, SOLUTION INTRAMUSCULAR; INTRAVENOUS; SUBCUTANEOUS at 09:25

## 2021-04-06 RX ADMIN — HYDROMORPHONE HYDROCHLORIDE 0.5 MG: 1 INJECTION, SOLUTION INTRAMUSCULAR; INTRAVENOUS; SUBCUTANEOUS at 15:05

## 2021-04-06 RX ADMIN — FERROUS SULFATE TAB EC 325 MG (65 MG FE EQUIVALENT) 325 MG: 325 (65 FE) TABLET DELAYED RESPONSE at 09:13

## 2021-04-06 RX ADMIN — HYDROCODONE BITARTRATE AND ACETAMINOPHEN 2 TABLET: 5; 325 TABLET ORAL at 06:08

## 2021-04-06 RX ADMIN — METRONIDAZOLE 500 MG: 500 TABLET, FILM COATED ORAL at 06:08

## 2021-04-06 RX ADMIN — FOLIC ACID 1 MG: 1 TABLET ORAL at 09:13

## 2021-04-06 RX ADMIN — SODIUM CHLORIDE, PRESERVATIVE FREE 10 ML: 5 INJECTION INTRAVENOUS at 15:06

## 2021-04-06 RX ADMIN — CIPROFLOXACIN 500 MG: 500 TABLET ORAL at 09:13

## 2021-04-06 RX ADMIN — METRONIDAZOLE 500 MG: 500 TABLET, FILM COATED ORAL at 13:08

## 2021-04-06 RX ADMIN — GABAPENTIN 300 MG: 300 CAPSULE ORAL at 13:08

## 2021-04-06 RX ADMIN — FAMOTIDINE 20 MG: 10 INJECTION INTRAVENOUS at 09:13

## 2021-04-06 RX ADMIN — SODIUM CHLORIDE, PRESERVATIVE FREE 10 ML: 5 INJECTION INTRAVENOUS at 09:15

## 2021-04-06 RX ADMIN — APIXABAN 5 MG: 5 TABLET, FILM COATED ORAL at 09:13

## 2021-04-06 RX ADMIN — GABAPENTIN 300 MG: 300 CAPSULE ORAL at 09:13

## 2021-04-06 RX ADMIN — CARVEDILOL 6.25 MG: 6.25 TABLET, FILM COATED ORAL at 09:13

## 2021-04-06 RX ADMIN — CLOPIDOGREL 75 MG: 75 TABLET, FILM COATED ORAL at 09:13

## 2021-04-06 RX ADMIN — LISINOPRIL 10 MG: 10 TABLET ORAL at 09:13

## 2021-04-06 ASSESSMENT — PAIN SCALES - GENERAL
PAINLEVEL_OUTOF10: 8
PAINLEVEL_OUTOF10: 7
PAINLEVEL_OUTOF10: 5

## 2021-04-06 NOTE — PLAN OF CARE
Problem: Falls - Risk of:  Goal: Will remain free from falls  Description: Will remain free from falls  4/6/2021 0246 by Kai Winchester RN  Outcome: Ongoing  4/5/2021 1900 by Les Call RN  Outcome: Ongoing  Goal: Absence of physical injury  Description: Absence of physical injury  4/5/2021 1900 by Les Call RN  Outcome: Ongoing     Problem: Pain:  Goal: Pain level will decrease  Description: Pain level will decrease  4/5/2021 1900 by Les Call RN  Outcome: Ongoing  Goal: Control of acute pain  Description: Control of acute pain  4/6/2021 0246 by Kai Winchester RN  Outcome: Ongoing  4/5/2021 1900 by Les Call RN  Outcome: Ongoing  Goal: Control of chronic pain  Description: Control of chronic pain  4/5/2021 1900 by Les Call RN  Outcome: Ongoing     Problem: IP BALANCE  Goal: BALANCE EDUCATION  Description: Educate patients on maintaining dynamic/static standing/sitting balance, with/without upper extremity support. 4/5/2021 1900 by Les Call RN  Outcome: Ongoing     Problem: IP MOBILITY  Goal: LTG - patient will ambulate household distance  4/5/2021 1900 by Les Call RN  Outcome: Ongoing     Problem: Nutrition  Goal: Optimal nutrition therapy  4/5/2021 1900 by Les Call RN  Outcome: Ongoing  4/5/2021 1316 by Rosangela Auguste RD, LD  Outcome: Ongoing  Note: Nutrition Problem #1: Inadequate oral intake  Intervention: Food and/or Nutrient Delivery: Continue NPO(Monitor for restart of oral diet. Will provide ONS with meals as able.)  Nutritional Goals: Oral intakes to meet at % of estimated nutrition needs.

## 2021-04-06 NOTE — DISCHARGE SUMMARY
[facility]  Office: [address], DO, Esther Ho, DO, Rhona De La Cruzrolando, DO, Stephan Bee, DO, Phan Horvath MD, Taylor Alcocer MD, Zaheer Nuno MD, Hi Marquez MD, Dania Mitchell MD, Jenny Saldivar MD, Devyn Fisher MD, Margaux Kinney MD, Fallon Polanco DO, Julio Lizarraga MD, Jason Yepez DO, Tray Lara MD,  Blessing Anderson DO, Mirella Doran MD, Hamzah Ceja MD, Monie Swanson MD, Yadiel Caputo MD, [facility] Jose, [facility], [facility], CNP, Suzy Navarrete, CNP, Mayur Izquierdo, CNS, Kathy Gastelum, CNP, Sahra Torres, CNP, Too Alcazar, CNP, Aaron Mary, CNP, Breanna Vasquez, CNP, Michael George PA-C, Eduardo Quintero, [facility], Kimberly Whipple, CNP, Carisa Mcclain, CNP, Irvin Butler, CNP, Veronika Naye, CNP, Alix Barrientos, [facility], [facility], [address] [place] [address]    Discharge Summary     Patient ID: Nikki Bowman  :  1955   MRN: [id number]     ACCOUNT:  [de-identified]   Patient's PCP: No primary care provider on file. Admit Date: 4/3/2021   Discharge Date: 2021     Length of Stay: 2  Code Status:  Full Code  Admitting Physician: Robert Salamanca DO  Discharge Physician: Jason Yepez DO     Active Discharge Diagnoses:     Hospital Problem Lists:  Principal Problem:    Pancreatic mass  Active Problems:    Essential hypertension    Right flank pain    COPD without exacerbation ([facility]    History of hepatitis C    Chronic diastolic heart failure ([facility]    Homelessness    Colitis  Resolved Problems:    * No resolved hospital problems. *      Admission Condition:  fair     Discharged Condition: fair    Hospital Stay:     Hospital Course:     This is a 69-year-old female who initially presented for abdominal pain found to have a pancreatic mass that was evaluated by gastroenterology who did an EGD and EUS with biopsies of the sample. Patient was also treated for infectious colitis.   Patient's abdominal pain did improve and she was stable for discharge however due to being homeless, discharge was delayed until 4/6/2021. We will follow-up with gastroenterology for pathology results, patient is homeless and was provided all her medications with meds to beds. Significant therapeutic interventions: EUS     Significant Diagnostic Studies:   Labs / Micro:  CBC:   Lab Results   Component Value Date    WBC 4.8 04/06/2021    RBC 3.71 04/06/2021    RBC 4.15 05/04/2012    HGB 12.0 04/06/2021    HCT 36.9 04/06/2021    MCV 99.5 04/06/2021    MCH 32.3 04/06/2021    MCHC 32.5 04/06/2021    RDW 13.8 04/06/2021    PLT Platelet clumps present, count appears adequate.  04/06/2021     05/04/2012     BMP:    Lab Results   Component Value Date    GLUCOSE 87 04/06/2021    GLUCOSE 76 05/04/2012     04/06/2021    K 4.5 04/06/2021     04/06/2021    CO2 19 04/06/2021    ANIONGAP 9 04/06/2021    BUN 12 04/06/2021    CREATININE 0.54 04/06/2021    BUNCRER NOT REPORTED 04/06/2021    CALCIUM 8.7 04/06/2021    LABGLOM >60 04/06/2021    GFRAA >60 04/06/2021    GFR      04/06/2021    GFR NOT REPORTED 04/06/2021     CMP:    Lab Results   Component Value Date    GLUCOSE 87 04/06/2021    GLUCOSE 76 05/04/2012     04/06/2021    K 4.5 04/06/2021     04/06/2021    CO2 19 04/06/2021    BUN 12 04/06/2021    CREATININE 0.54 04/06/2021    ANIONGAP 9 04/06/2021    ALKPHOS 69 04/06/2021    ALT 37 04/06/2021    AST 52 04/06/2021    BILITOT 0.88 04/06/2021    LABALBU 3.3 04/06/2021    LABALBU 3.3 04/06/2021    LABALBU 4.4 11/14/2011    ALBUMIN 1.0 04/06/2021    LABGLOM >60 04/06/2021    GFRAA >60 04/06/2021    GFR      04/06/2021    GFR NOT REPORTED 04/06/2021    PROT 6.6 04/06/2021    CALCIUM 8.7 04/06/2021      Radiology:  Ct Abdomen Pelvis W Iv Contrast Additional Contrast? None    Result Date: 4/3/2021  Thickening of the wall of the ascending colon with mild surrounding fatty stranding is likely suggestive of infectious/inflammatory colitis. Apparent hyperenhancement of the mucosa of the stomach. Finding is nonspecific but may be suggestive of gastritis. 2.3 x 1.9 cm peripherally enhancing lesion appears to arise from the mid body of the pancreas. The peripheral aspect of this lesion has a more solid appearance in comparison to prior exam. There is also associated mild dilatation of pancreatic duct. 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 For further evaluation of this finding contrast enhance MRCP examination is recommended. Severe degenerative changes of the left hip joint with bone-on-bone appearance. Consultations:    Consults:     Final Specialist Recommendations/Findings:   IP CONSULT TO HOSPITALIST  IP CONSULT TO SOCIAL WORK  IP CONSULT TO DIETITIAN  IP CONSULT TO GI      The patient was seen and examined on day of discharge and this discharge summary is in conjunction with any daily progress note from day of discharge.     Discharge plan:     Disposition: Home    Physician Follow Up:   Find PCP     Schedule an appointment as soon as possible for a visit in 1 week  hospital followup    [facility]  [address] [place] [address] Rd  [address] [address]  [phone]  Schedule an appointment as soon as possible for a visit in 1 week  hospital followup       Requiring Further Evaluation/Follow Up POST HOSPITALIZATION/Incidental Findings: none    Diet: regular diet    Activity: As tolerated    Instructions to Patient: See GI outpatient    Discharge Medications:      Medication List      START taking these medications    ciprofloxacin 500 MG tablet  Commonly known as: CIPRO  Take 1 tablet by mouth every 12 hours for 10 days     famotidine 20 MG tablet  Commonly known as: PEPCID  Take 1 tablet by mouth 2 times daily     metroNIDAZOLE 500 MG tablet  Commonly known as: FLAGYL  Take 1 tablet by mouth every 8 hours for 10 days        CHANGE how you take these medications    mirtazapine 30 MG tablet  Commonly known as: REMERON  Take 1 tablet by mouth nightly  What changed:   how much to take  how to take this  when to take this        CONTINUE taking these medications    albuterol sulfate  (90 Base) MCG/ACT inhaler  Commonly known as: Proventil HFA  Inhale 1-2 puffs into the lungs once for 1 dose     amitriptyline 10 MG tablet  Commonly known as: ELAVIL  Take 1 tablet by mouth nightly     apixaban 5 MG Tabs tablet  Commonly known as: ELIQUIS  Take 1 tablet by mouth 2 times daily     atorvastatin 40 MG tablet  Commonly known as: LIPITOR  Take 1 tablet by mouth nightly     butalbital-acetaminophen-caffeine -40 MG per tablet  Commonly known as: FIORICET, ESGIC  Take 1 tablet by mouth every 12 hours for 5 doses     carvedilol 6.25 MG tablet  Commonly known as: COREG  Take 1 tablet by mouth 2 times daily (with meals)     clopidogrel 75 MG tablet  Commonly known as: PLAVIX  Take 1 tablet by mouth daily     cyanocobalamin 250 MCG tablet  Take 1 tablet by mouth daily     ferrous sulfate 325 (65 Fe) MG tablet  Commonly known as: Willy-Dominik  Take 1 tablet by mouth daily     folic acid 1 MG tablet  Commonly known as: FOLVITE  Take 1 tablet by mouth daily     gabapentin 300 MG capsule  Commonly known as: NEURONTIN  Take 1 capsule by mouth 3 times daily for 30 days.      ipratropium-albuterol 0.5-2.5 (3) MG/3ML Soln nebulizer solution  Commonly known as: DUONEB  Inhale 3 mLs into the lungs every 4 hours as needed for Shortness of Breath     lisinopril 10 MG tablet  Commonly known as: PRINIVIL;ZESTRIL  Take 1 tablet by mouth daily     Lumbar Back Brace/Support Pad Misc  1 Units by Does not apply route daily     melatonin 1 MG tablet  Take 1 tablet by mouth nightly as needed for Sleep     mometasone-formoterol 100-5 MCG/ACT inhaler  Commonly known as: DULERA  Inhale 2 puffs into the lungs 2 times daily     Senna 8.6 MG Caps     therapeutic multivitamin-minerals tablet  Take 1 tablet by mouth daily     thiamine 100 MG tablet  Take 1 tablet by mouth daily     traMADol 50 MG tablet  Commonly known as: ULTRAM  Take 1 tablet by mouth 2 times daily as needed for Pain for up to 30 days. Wheelchair Misc  1 Device by Does not apply route once for 1 dose        STOP taking these medications    pantoprazole 40 MG tablet  Commonly known as: PROTONIX           Where to Get Your Medications      These medications were sent to [facility], [address] [place] [address]  [address] [facility], [facility] [address]    Phone: [phone]   albuterol sulfate  (90 Base) MCG/ACT inhaler  amitriptyline 10 MG tablet  apixaban 5 MG Tabs tablet  atorvastatin 40 MG tablet  carvedilol 6.25 MG tablet  ciprofloxacin 500 MG tablet  clopidogrel 75 MG tablet  famotidine 20 MG tablet  ferrous sulfate 325 (65 Fe) MG tablet  gabapentin 300 MG capsule  lisinopril 10 MG tablet  metroNIDAZOLE 500 MG tablet  mirtazapine 30 MG tablet  mometasone-formoterol 100-5 MCG/ACT inhaler         No discharge procedures on file. Time Spent on discharge is  36 mins in patient examination, evaluation, counseling as well as medication reconciliation, prescriptions for required medications, discharge plan and follow up. Electronically signed by   Maine Nina DO  4/6/2021  12:45 PM      Thank you Dr. Bekah Delgado primary care provider on file. for the opportunity to be involved in this patient's care.

## 2021-04-06 NOTE — PROGRESS NOTES
CLINICAL PHARMACY NOTE: MEDS TO [address] [place] [address] Drive Select Patient?: No  Total # of Prescriptions Filled: 13   The following medications were delivered to the patient:  · Ciprofloxacin 500 mg tab  · Famotidine 20 mg  · Metronidazole 500 mg tab  · dulera 100- 5 mcg inh  · Mirtazapine 30 mg tab  · Lisinopril 10 mg tab  · Gabapentin 300 mg cap  · Ferrous sulfate 325 mg tab  · Clopidogrel 75mg tab  · Atorvastatin 40 mg tab  · eliquis 5 mg tab  · Amitriptyline 10 mg tab  · Albuterol hfa inhaler  Total # of Interventions Completed: 1  Time Spent (min): 60    Additional Documentation:Medications delivered to the patient in her room 446.  Carvedilol was too soon to fill,

## 2021-04-06 NOTE — PLAN OF CARE
Problem: Falls - Risk of:  Goal: Will remain free from falls  Description: Will remain free from falls  4/6/2021 1214 by Sravan Gonzalez  Outcome: Ongoing  4/6/2021 0246 by Kadie Pablo RN  Outcome: Ongoing  Goal: Absence of physical injury  Description: Absence of physical injury  Outcome: Ongoing     Problem: Pain:  Goal: Pain level will decrease  Description: Pain level will decrease  Outcome: Ongoing  Goal: Control of acute pain  Description: Control of acute pain  4/6/2021 1214 by Sravan Gonzalez  Outcome: Ongoing  4/6/2021 0246 by Kadie Pablo RN  Outcome: Ongoing  Goal: Control of chronic pain  Description: Control of chronic pain  Outcome: Ongoing     Problem: IP BALANCE  Goal: BALANCE EDUCATION  Description: Educate patients on maintaining dynamic/static standing/sitting balance, with/without upper extremity support.   Outcome: Ongoing     Problem: IP MOBILITY  Goal: LTG - patient will ambulate household distance  Outcome: Ongoing     Problem: Nutrition  Goal: Optimal nutrition therapy  Outcome: Ongoing

## 2021-04-06 NOTE — PROGRESS NOTES
[facility]  Office: [address], DO, Marya Oglesby, DO, Val Jay, DO, Selwyn Stover Blood, DO, Amanda Hargrove MD, Lida Barcenas MD, Antonio Woods MD, Jair Aguila MD, Tran Manuel MD, Vern Miller MD, Sudeep Rangel MD, Malia Fermin MD, Luis M Seth DO, Ansley Lucia MD, Jaclyn Mosquera DO, Tavon Bragg MD,  Bianka Brar DO, Vannesa Hidalgo MD, Doc Rios MD, Carlos Fox MD, Em Martinez MD, Josep Noonan, Diana Lopez, CNP, Erik Patel, CNP, Cedric Spangler, CNS, Shemar Valdez, CNP, Jose Angel, CNP, Ramone Clay, CNP, Rupali Oh, CNP, Rush Harris, CNP, Mary Chung PA-C, Tita Azar, [facility], Loida Childress, CNP, Omar Barroso, CNP, Crystal Grajeda, CNP, Obdulia Kahn, CNP, Georgette Rivera, [facility], Mackenzie Rosario, [address] [place] [address]    Progress Note    4/6/2021    12:43 PM    Name:   Samson Nuñez  MRN:     [id number]     Acct:      [de-identified]   Room:   [address] [place] [address] Day:  2  Admit Date:  4/3/2021  7:10 PM    PCP:   No primary care provider on file. Code Status:  Full Code    Subjective:     C/C:   Chief Complaint   Patient presents with    Flank Pain     right side      Interval History Status: improved. With gastroenterology this morning, cleared for discharge. Patient still working with  regarding discharge but feels much better today, no complaints    Brief History: This is a 57-year-old female who initially presented for abdominal pain found to have a pancreatic mass that was evaluated by gastroenterology who did an EGD and EUS with biopsies of the sample. Patient was also treated for infectious colitis.   Patient's abdominal pain did improve and she was stable for discharge however due to being homeless, discharge was delayed until 4/6/2021    Review of Systems:     Constitutional:  negative for chills, fevers, sweats  Respiratory:  negative for cough, dyspnea on exertion, shortness of breath, wheezing  Cardiovascular:  negative for chest pain, chest pressure/discomfort, lower extremity edema, palpitations  Gastrointestinal: Midst of some abdominal discomfort no diarrhea or vomiting  Neurological:  negative for dizziness, headache    Medications: Allergies:     Allergies   Allergen Reactions    Aspirin Itching    Keflet [Cephalexin]      Tolerated penicillin V 12/13, tolerates cephalexin 6/14       Current Meds:   Scheduled Meds:    famotidine  20 mg Oral BID    ciprofloxacin  500 mg Oral 2 times per day    metroNIDAZOLE  500 mg Oral 3 times per day    amitriptyline  10 mg Oral Nightly    apixaban  5 mg Oral BID    atorvastatin  40 mg Oral Nightly    carvedilol  6.25 mg Oral BID WC    clopidogrel  75 mg Oral Daily    ferrous sulfate  325 mg Oral Daily    folic acid  1 mg Oral Daily    gabapentin  300 mg Oral TID    lisinopril  10 mg Oral Daily     Continuous Infusions:    sodium chloride       PRN Meds: melatonin, sodium chloride flush, sodium chloride, potassium chloride, magnesium sulfate, acetaminophen, HYDROcodone 5 mg - acetaminophen **OR** HYDROcodone 5 mg - acetaminophen, HYDROmorphone **OR** HYDROmorphone, promethazine **OR** ondansetron, labetalol    Data:     Past Medical History:   has a past medical history of Appendicitis, Cerebral artery occlusion with cerebral infarction ([facility] CHF (congestive heart failure) ([facility]), Chronic respiratory failure with hypoxia and hypercapnia ([facility]), Chronic rhinitis, Cigarette smoker, COPD (chronic obstructive pulmonary disease) ([facility] Depression, Dysphagia, Essential hypertension, GERD (gastroesophageal reflux disease), Heart failure, diastolic, with acute decompensation ([facility]), Hepatitis C, chronic ([facility] History of smoking at least 1 pack per day for at least 30 years, Hyperlipidemia, Hypertension, Migraine, Moderate COPD (chronic obstructive pulmonary disease) ([facility] Multiple transfusions, Neurogenic dysphagia, Osteoarthritis, Pneumonia, Scoliosis, and Substance abuse ([address] Social History:   reports that she quit smoking about 2 years ago. Her smoking use included cigarettes. She started smoking about 51 years ago. She has a 40.00 pack-year smoking history. She has never used smokeless tobacco. She reports that she does not drink alcohol or use drugs. Family History:   Family History   Problem Relation Age of Onset    Breast Cancer Mother     Heart Disease Father        Vitals:  BP (!) 145/85   Pulse 71   Temp 98.7 °F (37.1 °C) (Oral)   Resp 16   Ht 4' 11\" (1.499 m)   Wt 94 lb 8 oz (42.9 kg)   LMP 2015   SpO2 95%   BMI 19.09 kg/m²   Temp (24hrs), Av.2 °F (36.8 °C), Min:97.8 °F (36.6 °C), Max:98.7 °F (37.1 °C)    No results for input(s): POCGLU in the last 72 hours. I/O (24Hr): Intake/Output Summary (Last 24 hours) at 2021 1243  Last data filed at 2021 1255  Gross per 24 hour   Intake 500 ml   Output    Net 500 ml       Labs:  Hematology:  Recent Labs     21  0632 21  0837   WBC 7.0 4.6  --  4.8   RBC 5.37* 3.65*  --  3.71*   HGB 17.5* 11.9  --  12.0   HCT 52.2* 35.5*  --  36.9   MCV 97.2 97.3  --  99.5   MCH 32.6 32.6  --  32.3   MCHC 33.5 33.5  --  32.5   RDW 14.5* 14.2  --  13.8   PLT See Reflexed IPF Result 410  --  Platelet clumps present, count appears adequate.    MPV NOT REPORTED 9.2  --  NOT REPORTED   INR  --   --  1.0  --      Chemistry:  Recent Labs     21  0632 21  0837   * 137  --  137   K 4.8 3.3*  --  4.5   CL 96* 106  --  109*   CO2 18* 19*  --  19*   GLUCOSE 91 82  --  87   BUN 12 20  --  12   CREATININE 0.56 0.64  --  0.54   MG  --  2.1  --  1.8   ANIONGAP 19* 12  --  9   LABGLOM >60 >60  --  >60   GFRAA >60 >60  --  >60   CALCIUM 10.6* 8.4*  --  8.7   CAION  --   --  1.11*  --    PHOS  --   --   --  3.0   PROBNP 742*  --   --   --    TROPHS 9  --   -- --      Recent Labs     04/03/21  1933 04/04/21  1906 04/06/21  0837   PROT 10.4* 6.5 6.6   LABALBU 5.1 3.6 3.3*  3.3*   AST 66* 92* 52*   ALT 51* 61* 37*   ALKPHOS 111* 80 69   BILITOT 0.84 1.26* 0.88   BILIDIR  --   --  0.10   LIPASE 44  --   --      ABG:  Lab Results   Component Value Date    POCPH 7.366 01/08/2019    POCPCO2 51.0 01/08/2019    POCPO2 65.9 01/08/2019    POCHCO3 29.2 01/08/2019    NBEA NOT REPORTED 01/08/2019    PBEA 3 01/08/2019    MAU3DFF 31 01/08/2019    CNBU9COL 92 01/08/2019    FIO2 NOT REPORTED 09/04/2019     Lab Results   Component Value Date/Time    SPECIAL NOT REPORTED 04/04/2021 12:09 AM     Lab Results   Component Value Date/Time    CULTURE NO SIGNIFICANT GROWTH 04/04/2021 12:09 AM       Radiology:  Ct Abdomen Pelvis W Iv Contrast Additional Contrast? None    Result Date: 4/3/2021  Thickening of the wall of the ascending colon with mild surrounding fatty stranding is likely suggestive of infectious/inflammatory colitis. Apparent hyperenhancement of the mucosa of the stomach. Finding is nonspecific but may be suggestive of gastritis. 2.3 x 1.9 cm peripherally enhancing lesion appears to arise from the mid body of the pancreas. The peripheral aspect of this lesion has a more solid appearance in comparison to prior exam. There is also associated mild dilatation of pancreatic duct. 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 For further evaluation of this finding contrast enhance MRCP examination is recommended. Severe degenerative changes of the left hip joint with bone-on-bone appearance.        Physical Examination:        General appearance:  alert, cooperative and no distress  Mental Status:  oriented to person, place and time and normal affect  Lungs:  clear to auscultation bilaterally, normal effort  Heart:  regular rate and rhythm, no murmur  Abdomen:  soft, nontender, nondistended, normal bowel sounds, no masses, hepatomegaly,

## 2021-04-06 NOTE — PROGRESS NOTES
Occupational Therapy    Occupational Therapy Not Seen Note    DATE: 2021  Name: Ama Ko  : 1955  MRN: [id number]    Patient not available for Occupational Therapy due to:    Patient Declined: all ADLs noting already completed, pt to d/c this date    Next Scheduled Treatment: Attempt back if time allotted    Electronically signed by VERONIQUE Carcamo on 2021 at 3:34 PM

## 2021-04-07 ENCOUNTER — TELEPHONE (OUTPATIENT)
Dept: GASTROENTEROLOGY | Age: 66
End: 2021-04-07

## 2021-04-07 NOTE — TELEPHONE ENCOUNTER
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 Patient understands the diagnosis and need to follow-up with a GI as well as surgery team.  All questions were answered.

## 2021-04-12 ENCOUNTER — TELEPHONE (OUTPATIENT)
Dept: GASTROENTEROLOGY | Age: 66
End: 2021-04-12

## 2021-04-12 NOTE — TELEPHONE ENCOUNTER
Spoke with Ping, she stated the available appts she has for pt to be seen sooner are: 4/12 @ [facility] [address] [place] [address] office, 4/20 @ [facility]  office & 4/27 @ [facility]  office. Writer called and spoke with pt, appt was rs to 4/20 @ 845 am at the [place] office.

## 2021-04-12 NOTE — TELEPHONE ENCOUNTER
----- Message from Olaf Fairbanks MA sent at 4/8/2021  3:51 PM EDT -----  Regarding: RE: Can you call patient to set up OV with Dr. Jimmie Dodd. Darell Cici,  Her appointment is not until May 27th- Can you please ask Dr. Inocente Perea if the patient should be seen sooner?  ----- Message -----  From: Mally Brewer  Sent: 4/8/2021   1:55 PM EDT  To: Olaf Fairbanks MA, Ann Hughes, #  Subject: RE: Can you call patient to set up OV with D#    I reviewed patients chart and she has an appt already scheduled with Dr Inocente Perea.     ----- Message -----  From: Ann Hughes  Sent: 4/7/2021   2:17 PM EDT  To: Olaf Fairbanks MA, Marcelle Goodell, LPN, #  Subject: Can you call patient to set up OV with Dr. Bryan Sullivan      ----- Message -----  From: Romana Fujisawa, MD  Sent: 4/7/2021   1:59 PM EDT  To: Kristin Kussmaul, MD, Ivan Singleton,    Can you please have this patient follow-up with Dr. Jimmie Dodd for GIST tumor in 1 to 2 weeks. She may need a referral to surgery for possible resection. 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 Thank you.

## 2021-04-13 ENCOUNTER — TELEPHONE (OUTPATIENT)
Dept: PAIN MANAGEMENT | Age: 66
End: 2021-04-13

## 2021-04-13 NOTE — TELEPHONE ENCOUNTER
Pt need pain medication refill for pt hip, pt stats that is all out, please send medication to pharmacy on file, pt did not know the name of medication or dosage, pt has appt 5/18

## 2021-04-13 NOTE — TELEPHONE ENCOUNTER
Pt instructed to call pharmacy for refill on Tramadol. Rx was sent in on 3/15/212 for a 30 day supply with one refill.  Pt verbalized understanding

## 2021-04-15 ENCOUNTER — TELEPHONE (OUTPATIENT)
Dept: PAIN MANAGEMENT | Age: 66
End: 2021-04-15

## 2021-04-15 NOTE — TELEPHONE ENCOUNTER
Pt states she is having trouble filling refill on Tramadol at [facility]. Rx for 30 day supply with one refill was written on 3/16/21. Informed pt will contact [facility] on [address] and figure out the issue. Spoke to Julieth Mata from [facility] on [address] who stated that the pt has two different profiles and it wasn't caught before. Juliteh Mata states they filled the 3/16/21 Rx under an old profile from December but she will merge the profiles and make sure that the refill is filled for pt today.   [facility] that [facility] and with fill her Tramadol today

## 2021-04-21 ENCOUNTER — TELEPHONE (OUTPATIENT)
Dept: GASTROENTEROLOGY | Age: 66
End: 2021-04-21

## 2021-04-21 ENCOUNTER — HOSPITAL ENCOUNTER (INPATIENT)
Age: 66
LOS: 6 days | Discharge: NURSING, REHAB OR LONG-TERM CARE | DRG: 469 | End: 2021-04-27
Attending: EMERGENCY MEDICINE
Payer: MEDICARE

## 2021-04-21 ENCOUNTER — APPOINTMENT (OUTPATIENT)
Dept: CT IMAGING | Age: 66
DRG: 469 | End: 2021-04-21
Payer: MEDICARE

## 2021-04-21 DIAGNOSIS — C49.A0 GASTROINTESTINAL STROMAL TUMOR (GIST) (HCC): ICD-10-CM

## 2021-04-21 DIAGNOSIS — N17.9 AKI (ACUTE KIDNEY INJURY) (HCC): Primary | ICD-10-CM

## 2021-04-21 DIAGNOSIS — E16.2 HYPOGLYCEMIA: ICD-10-CM

## 2021-04-21 DIAGNOSIS — R10.11 RIGHT UPPER QUADRANT ABDOMINAL PAIN: ICD-10-CM

## 2021-04-21 PROBLEM — R07.89 RIGHT-SIDED CHEST WALL PAIN: Status: ACTIVE | Noted: 2021-04-21

## 2021-04-21 PROBLEM — Z85.09 H/O MALIGNANT GASTROINTESTINAL STROMAL TUMOR (GIST): Status: ACTIVE | Noted: 2021-04-21

## 2021-04-21 LAB
-: ABNORMAL
ALBUMIN SERPL-MCNC: 4.2 G/DL (ref 3.5–5.2)
ALBUMIN/GLOBULIN RATIO: 1 (ref 1–2.5)
ALP BLD-CCNC: 83 U/L (ref 35–104)
ALT SERPL-CCNC: 20 U/L (ref 5–33)
AMORPHOUS: ABNORMAL
ANION GAP SERPL CALCULATED.3IONS-SCNC: 20 MMOL/L (ref 9–17)
AST SERPL-CCNC: 39 U/L
BACTERIA: ABNORMAL
BILIRUB SERPL-MCNC: 0.46 MG/DL (ref 0.3–1.2)
BILIRUBIN URINE: NEGATIVE
BUN BLDV-MCNC: 23 MG/DL (ref 8–23)
BUN/CREAT BLD: ABNORMAL (ref 9–20)
CALCIUM SERPL-MCNC: 9.3 MG/DL (ref 8.6–10.4)
CASTS UA: ABNORMAL /LPF (ref 0–8)
CHLORIDE BLD-SCNC: 103 MMOL/L (ref 98–107)
CO2: 16 MMOL/L (ref 20–31)
COLOR: YELLOW
CREAT SERPL-MCNC: 1.46 MG/DL (ref 0.5–0.9)
CRYSTALS, UA: ABNORMAL /HPF
EPITHELIAL CELLS UA: ABNORMAL /HPF (ref 0–5)
GFR AFRICAN AMERICAN: 43 ML/MIN
GFR NON-AFRICAN AMERICAN: 36 ML/MIN
GFR SERPL CREATININE-BSD FRML MDRD: ABNORMAL ML/MIN/{1.73_M2}
GFR SERPL CREATININE-BSD FRML MDRD: ABNORMAL ML/MIN/{1.73_M2}
GLUCOSE BLD-MCNC: 171 MG/DL (ref 65–105)
GLUCOSE BLD-MCNC: 214 MG/DL (ref 65–105)
GLUCOSE BLD-MCNC: 37 MG/DL (ref 65–105)
GLUCOSE BLD-MCNC: 46 MG/DL (ref 70–99)
GLUCOSE URINE: NEGATIVE
HCT VFR BLD CALC: 42.2 % (ref 36.3–47.1)
HEMOGLOBIN: 13.7 G/DL (ref 11.9–15.1)
KETONES, URINE: NEGATIVE
LACTIC ACID, WHOLE BLOOD: 1.8 MMOL/L (ref 0.7–2.1)
LEUKOCYTE ESTERASE, URINE: ABNORMAL
LIPASE: 54 U/L (ref 13–60)
MCH RBC QN AUTO: 32.3 PG (ref 25.2–33.5)
MCHC RBC AUTO-ENTMCNC: 32.5 G/DL (ref 28.4–34.8)
MCV RBC AUTO: 99.5 FL (ref 82.6–102.9)
MUCUS: ABNORMAL
NITRITE, URINE: NEGATIVE
NRBC AUTOMATED: 0 PER 100 WBC
OTHER OBSERVATIONS UA: ABNORMAL
PARTIAL THROMBOPLASTIN TIME: 24 SEC (ref 20.5–30.5)
PDW BLD-RTO: 13.3 % (ref 11.8–14.4)
PH UA: 6.5 (ref 5–8)
PLATELET # BLD: 637 K/UL (ref 138–453)
PMV BLD AUTO: 9 FL (ref 8.1–13.5)
POTASSIUM SERPL-SCNC: 3.6 MMOL/L (ref 3.7–5.3)
PROTEIN UA: NEGATIVE
RBC # BLD: 4.24 M/UL (ref 3.95–5.11)
RBC UA: ABNORMAL /HPF (ref 0–4)
RENAL EPITHELIAL, UA: ABNORMAL /HPF
SODIUM BLD-SCNC: 139 MMOL/L (ref 135–144)
SPECIFIC GRAVITY UA: 1.05 (ref 1–1.03)
TOTAL PROTEIN: 8.4 G/DL (ref 6.4–8.3)
TRICHOMONAS: ABNORMAL
TROPONIN INTERP: ABNORMAL
TROPONIN T: ABNORMAL NG/ML
TROPONIN, HIGH SENSITIVITY: 19 NG/L (ref 0–14)
TURBIDITY: CLEAR
URINE HGB: NEGATIVE
UROBILINOGEN, URINE: NORMAL
WBC # BLD: 5.1 K/UL (ref 3.5–11.3)
WBC UA: ABNORMAL /HPF (ref 0–5)
YEAST: ABNORMAL

## 2021-04-21 PROCEDURE — 99285 EMERGENCY DEPT VISIT HI MDM: CPT

## 2021-04-21 PROCEDURE — 2580000003 HC RX 258: Performed by: EMERGENCY MEDICINE

## 2021-04-21 PROCEDURE — 6360000002 HC RX W HCPCS: Performed by: EMERGENCY MEDICINE

## 2021-04-21 PROCEDURE — 6360000004 HC RX CONTRAST MEDICATION: Performed by: EMERGENCY MEDICINE

## 2021-04-21 PROCEDURE — 2580000003 HC RX 258: Performed by: INTERNAL MEDICINE

## 2021-04-21 PROCEDURE — 85730 THROMBOPLASTIN TIME PARTIAL: CPT

## 2021-04-21 PROCEDURE — 6370000000 HC RX 637 (ALT 250 FOR IP): Performed by: NURSE PRACTITIONER

## 2021-04-21 PROCEDURE — 85027 COMPLETE CBC AUTOMATED: CPT

## 2021-04-21 PROCEDURE — 93005 ELECTROCARDIOGRAM TRACING: CPT | Performed by: INTERNAL MEDICINE

## 2021-04-21 PROCEDURE — 99222 1ST HOSP IP/OBS MODERATE 55: CPT | Performed by: INTERNAL MEDICINE

## 2021-04-21 PROCEDURE — 6360000002 HC RX W HCPCS: Performed by: INTERNAL MEDICINE

## 2021-04-21 PROCEDURE — 80053 COMPREHEN METABOLIC PANEL: CPT

## 2021-04-21 PROCEDURE — 96374 THER/PROPH/DIAG INJ IV PUSH: CPT

## 2021-04-21 PROCEDURE — 84484 ASSAY OF TROPONIN QUANT: CPT

## 2021-04-21 PROCEDURE — 74177 CT ABD & PELVIS W/CONTRAST: CPT

## 2021-04-21 PROCEDURE — 1200000000 HC SEMI PRIVATE

## 2021-04-21 PROCEDURE — 82947 ASSAY GLUCOSE BLOOD QUANT: CPT

## 2021-04-21 PROCEDURE — 83605 ASSAY OF LACTIC ACID: CPT

## 2021-04-21 PROCEDURE — 96375 TX/PRO/DX INJ NEW DRUG ADDON: CPT

## 2021-04-21 PROCEDURE — 81001 URINALYSIS AUTO W/SCOPE: CPT

## 2021-04-21 PROCEDURE — 83690 ASSAY OF LIPASE: CPT

## 2021-04-21 PROCEDURE — 96361 HYDRATE IV INFUSION ADD-ON: CPT

## 2021-04-21 PROCEDURE — 6370000000 HC RX 637 (ALT 250 FOR IP): Performed by: INTERNAL MEDICINE

## 2021-04-21 PROCEDURE — 94640 AIRWAY INHALATION TREATMENT: CPT

## 2021-04-21 RX ORDER — POTASSIUM CHLORIDE 20 MEQ/1
40 TABLET, EXTENDED RELEASE ORAL PRN
Status: DISCONTINUED | OUTPATIENT
Start: 2021-04-21 | End: 2021-04-27 | Stop reason: HOSPADM

## 2021-04-21 RX ORDER — GABAPENTIN 300 MG/1
300 CAPSULE ORAL 3 TIMES DAILY
Status: DISCONTINUED | OUTPATIENT
Start: 2021-04-21 | End: 2021-04-27 | Stop reason: HOSPADM

## 2021-04-21 RX ORDER — ACETAMINOPHEN 325 MG/1
650 TABLET ORAL EVERY 6 HOURS PRN
Status: DISCONTINUED | OUTPATIENT
Start: 2021-04-21 | End: 2021-04-27 | Stop reason: HOSPADM

## 2021-04-21 RX ORDER — IPRATROPIUM BROMIDE AND ALBUTEROL SULFATE 2.5; .5 MG/3ML; MG/3ML
1 SOLUTION RESPIRATORY (INHALATION) EVERY 4 HOURS PRN
Status: DISCONTINUED | OUTPATIENT
Start: 2021-04-21 | End: 2021-04-27 | Stop reason: HOSPADM

## 2021-04-21 RX ORDER — BUDESONIDE AND FORMOTEROL FUMARATE DIHYDRATE 160; 4.5 UG/1; UG/1
2 AEROSOL RESPIRATORY (INHALATION) 2 TIMES DAILY
Status: DISCONTINUED | OUTPATIENT
Start: 2021-04-21 | End: 2021-04-27 | Stop reason: HOSPADM

## 2021-04-21 RX ORDER — MORPHINE SULFATE 4 MG/ML
4 INJECTION, SOLUTION INTRAMUSCULAR; INTRAVENOUS ONCE
Status: COMPLETED | OUTPATIENT
Start: 2021-04-21 | End: 2021-04-21

## 2021-04-21 RX ORDER — HEPARIN SODIUM 1000 [USP'U]/ML
80 INJECTION, SOLUTION INTRAVENOUS; SUBCUTANEOUS PRN
Status: DISCONTINUED | OUTPATIENT
Start: 2021-04-21 | End: 2021-04-27 | Stop reason: HOSPADM

## 2021-04-21 RX ORDER — MAGNESIUM SULFATE 1 G/100ML
1000 INJECTION INTRAVENOUS PRN
Status: DISCONTINUED | OUTPATIENT
Start: 2021-04-21 | End: 2021-04-27 | Stop reason: HOSPADM

## 2021-04-21 RX ORDER — DEXTROSE MONOHYDRATE 25 G/50ML
12.5 INJECTION, SOLUTION INTRAVENOUS PRN
Status: DISCONTINUED | OUTPATIENT
Start: 2021-04-21 | End: 2021-04-27 | Stop reason: HOSPADM

## 2021-04-21 RX ORDER — HEPARIN SODIUM 1000 [USP'U]/ML
80 INJECTION, SOLUTION INTRAVENOUS; SUBCUTANEOUS ONCE
Status: COMPLETED | OUTPATIENT
Start: 2021-04-21 | End: 2021-04-21

## 2021-04-21 RX ORDER — CLOPIDOGREL BISULFATE 75 MG/1
75 TABLET ORAL DAILY
Status: DISCONTINUED | OUTPATIENT
Start: 2021-04-21 | End: 2021-04-27 | Stop reason: HOSPADM

## 2021-04-21 RX ORDER — ONDANSETRON 2 MG/ML
4 INJECTION INTRAMUSCULAR; INTRAVENOUS ONCE
Status: COMPLETED | OUTPATIENT
Start: 2021-04-21 | End: 2021-04-21

## 2021-04-21 RX ORDER — DEXTROSE MONOHYDRATE 25 G/50ML
25 INJECTION, SOLUTION INTRAVENOUS PRN
Status: DISCONTINUED | OUTPATIENT
Start: 2021-04-21 | End: 2021-04-21

## 2021-04-21 RX ORDER — POTASSIUM CHLORIDE 7.45 MG/ML
10 INJECTION INTRAVENOUS PRN
Status: DISCONTINUED | OUTPATIENT
Start: 2021-04-21 | End: 2021-04-27 | Stop reason: HOSPADM

## 2021-04-21 RX ORDER — FAMOTIDINE 20 MG/1
20 TABLET, FILM COATED ORAL 2 TIMES DAILY
Status: DISCONTINUED | OUTPATIENT
Start: 2021-04-21 | End: 2021-04-27 | Stop reason: HOSPADM

## 2021-04-21 RX ORDER — ONDANSETRON 2 MG/ML
4 INJECTION INTRAMUSCULAR; INTRAVENOUS EVERY 6 HOURS PRN
Status: DISCONTINUED | OUTPATIENT
Start: 2021-04-21 | End: 2021-04-27 | Stop reason: HOSPADM

## 2021-04-21 RX ORDER — FOLIC ACID 1 MG/1
1 TABLET ORAL DAILY
Status: DISCONTINUED | OUTPATIENT
Start: 2021-04-21 | End: 2021-04-27 | Stop reason: HOSPADM

## 2021-04-21 RX ORDER — SODIUM CHLORIDE 9 MG/ML
25 INJECTION, SOLUTION INTRAVENOUS PRN
Status: DISCONTINUED | OUTPATIENT
Start: 2021-04-21 | End: 2021-04-27 | Stop reason: HOSPADM

## 2021-04-21 RX ORDER — MIRTAZAPINE 30 MG/1
30 TABLET, FILM COATED ORAL NIGHTLY
Status: DISCONTINUED | OUTPATIENT
Start: 2021-04-21 | End: 2021-04-27 | Stop reason: HOSPADM

## 2021-04-21 RX ORDER — HYDROXYZINE HYDROCHLORIDE 25 MG/1
25 TABLET, FILM COATED ORAL 3 TIMES DAILY PRN
Status: DISCONTINUED | OUTPATIENT
Start: 2021-04-21 | End: 2021-04-27 | Stop reason: HOSPADM

## 2021-04-21 RX ORDER — LISINOPRIL 10 MG/1
10 TABLET ORAL DAILY
Status: CANCELLED | OUTPATIENT
Start: 2021-04-21

## 2021-04-21 RX ORDER — SODIUM CHLORIDE 0.9 % (FLUSH) 0.9 %
5-40 SYRINGE (ML) INJECTION EVERY 12 HOURS SCHEDULED
Status: DISCONTINUED | OUTPATIENT
Start: 2021-04-21 | End: 2021-04-27 | Stop reason: HOSPADM

## 2021-04-21 RX ORDER — SODIUM CHLORIDE 0.9 % (FLUSH) 0.9 %
10 SYRINGE (ML) INJECTION PRN
Status: DISCONTINUED | OUTPATIENT
Start: 2021-04-21 | End: 2021-04-27 | Stop reason: HOSPADM

## 2021-04-21 RX ORDER — HYDROCODONE BITARTRATE AND ACETAMINOPHEN 5; 325 MG/1; MG/1
1 TABLET ORAL EVERY 6 HOURS PRN
Status: DISCONTINUED | OUTPATIENT
Start: 2021-04-21 | End: 2021-04-22

## 2021-04-21 RX ORDER — DEXTROSE MONOHYDRATE 50 MG/ML
100 INJECTION, SOLUTION INTRAVENOUS PRN
Status: DISCONTINUED | OUTPATIENT
Start: 2021-04-21 | End: 2021-04-27 | Stop reason: HOSPADM

## 2021-04-21 RX ORDER — POLYETHYLENE GLYCOL 3350 17 G/17G
17 POWDER, FOR SOLUTION ORAL DAILY PRN
Status: DISCONTINUED | OUTPATIENT
Start: 2021-04-21 | End: 2021-04-27 | Stop reason: HOSPADM

## 2021-04-21 RX ORDER — NICOTINE POLACRILEX 4 MG
15 LOZENGE BUCCAL PRN
Status: DISCONTINUED | OUTPATIENT
Start: 2021-04-21 | End: 2021-04-27 | Stop reason: HOSPADM

## 2021-04-21 RX ORDER — HEPARIN SODIUM 10000 [USP'U]/100ML
5-30 INJECTION, SOLUTION INTRAVENOUS CONTINUOUS
Status: DISCONTINUED | OUTPATIENT
Start: 2021-04-21 | End: 2021-04-26

## 2021-04-21 RX ORDER — CARVEDILOL 12.5 MG/1
6.25 TABLET ORAL 2 TIMES DAILY WITH MEALS
Status: DISCONTINUED | OUTPATIENT
Start: 2021-04-21 | End: 2021-04-27 | Stop reason: HOSPADM

## 2021-04-21 RX ORDER — HEPARIN SODIUM 1000 [USP'U]/ML
40 INJECTION, SOLUTION INTRAVENOUS; SUBCUTANEOUS PRN
Status: DISCONTINUED | OUTPATIENT
Start: 2021-04-21 | End: 2021-04-27 | Stop reason: HOSPADM

## 2021-04-21 RX ORDER — PROMETHAZINE HYDROCHLORIDE 12.5 MG/1
12.5 TABLET ORAL EVERY 6 HOURS PRN
Status: DISCONTINUED | OUTPATIENT
Start: 2021-04-21 | End: 2021-04-27 | Stop reason: HOSPADM

## 2021-04-21 RX ORDER — 0.9 % SODIUM CHLORIDE 0.9 %
1000 INTRAVENOUS SOLUTION INTRAVENOUS ONCE
Status: COMPLETED | OUTPATIENT
Start: 2021-04-21 | End: 2021-04-21

## 2021-04-21 RX ORDER — ATORVASTATIN CALCIUM 80 MG/1
40 TABLET, FILM COATED ORAL NIGHTLY
Status: DISCONTINUED | OUTPATIENT
Start: 2021-04-21 | End: 2021-04-27 | Stop reason: HOSPADM

## 2021-04-21 RX ORDER — AMITRIPTYLINE HYDROCHLORIDE 10 MG/1
10 TABLET, FILM COATED ORAL NIGHTLY
Status: DISCONTINUED | OUTPATIENT
Start: 2021-04-21 | End: 2021-04-27 | Stop reason: HOSPADM

## 2021-04-21 RX ORDER — LANOLIN ALCOHOL/MO/W.PET/CERES
325 CREAM (GRAM) TOPICAL DAILY
Status: DISCONTINUED | OUTPATIENT
Start: 2021-04-21 | End: 2021-04-27 | Stop reason: HOSPADM

## 2021-04-21 RX ORDER — SODIUM CHLORIDE 9 MG/ML
INJECTION, SOLUTION INTRAVENOUS CONTINUOUS
Status: DISCONTINUED | OUTPATIENT
Start: 2021-04-21 | End: 2021-04-27 | Stop reason: HOSPADM

## 2021-04-21 RX ORDER — M-VIT,TX,IRON,MINS/CALC/FOLIC 27MG-0.4MG
1 TABLET ORAL DAILY
Status: DISCONTINUED | OUTPATIENT
Start: 2021-04-21 | End: 2021-04-27 | Stop reason: HOSPADM

## 2021-04-21 RX ORDER — ACETAMINOPHEN 650 MG/1
650 SUPPOSITORY RECTAL EVERY 6 HOURS PRN
Status: DISCONTINUED | OUTPATIENT
Start: 2021-04-21 | End: 2021-04-27 | Stop reason: HOSPADM

## 2021-04-21 RX ADMIN — SODIUM CHLORIDE 1000 ML: 9 INJECTION, SOLUTION INTRAVENOUS at 11:27

## 2021-04-21 RX ADMIN — MORPHINE SULFATE 4 MG: 4 INJECTION INTRAVENOUS at 14:07

## 2021-04-21 RX ADMIN — HYDROCODONE BITARTRATE AND ACETAMINOPHEN 1 TABLET: 5; 325 TABLET ORAL at 19:49

## 2021-04-21 RX ADMIN — GABAPENTIN 300 MG: 300 CAPSULE ORAL at 20:36

## 2021-04-21 RX ADMIN — MIRTAZAPINE 30 MG: 30 TABLET, FILM COATED ORAL at 20:36

## 2021-04-21 RX ADMIN — HEPARIN SODIUM AND DEXTROSE 18 UNITS/KG/HR: 10000; 5 INJECTION INTRAVENOUS at 18:36

## 2021-04-21 RX ADMIN — DEXTROSE MONOHYDRATE 25 G: 25 INJECTION, SOLUTION INTRAVENOUS at 12:18

## 2021-04-21 RX ADMIN — SODIUM CHLORIDE: 9 INJECTION, SOLUTION INTRAVENOUS at 16:35

## 2021-04-21 RX ADMIN — HEPARIN SODIUM 3430 UNITS: 1000 INJECTION INTRAVENOUS; SUBCUTANEOUS at 18:35

## 2021-04-21 RX ADMIN — IOPAMIDOL 75 ML: 755 INJECTION, SOLUTION INTRAVENOUS at 13:09

## 2021-04-21 RX ADMIN — ONDANSETRON 4 MG: 2 INJECTION INTRAMUSCULAR; INTRAVENOUS at 11:27

## 2021-04-21 RX ADMIN — MORPHINE SULFATE 4 MG: 4 INJECTION INTRAVENOUS at 11:27

## 2021-04-21 RX ADMIN — BUDESONIDE AND FORMOTEROL FUMARATE DIHYDRATE 2 PUFF: 160; 4.5 AEROSOL RESPIRATORY (INHALATION) at 19:46

## 2021-04-21 ASSESSMENT — ENCOUNTER SYMPTOMS
SORE THROAT: 0
NAUSEA: 1
ABDOMINAL PAIN: 1
RHINORRHEA: 0
ABDOMINAL DISTENTION: 0
WHEEZING: 0
SHORTNESS OF BREATH: 0
DIARRHEA: 1
VOMITING: 1
CONSTIPATION: 0
COUGH: 0

## 2021-04-21 ASSESSMENT — PAIN DESCRIPTION - LOCATION: LOCATION: ABDOMEN;CHEST

## 2021-04-21 ASSESSMENT — PAIN DESCRIPTION - PAIN TYPE
TYPE: ACUTE PAIN
TYPE: CHRONIC PAIN

## 2021-04-21 ASSESSMENT — PAIN SCALES - GENERAL: PAINLEVEL_OUTOF10: 9

## 2021-04-21 NOTE — TELEPHONE ENCOUNTER
Patient was a no show on 4/20/21. Called patient to reschedule and she will call back when she is feeling better. No show letter mailed.

## 2021-04-21 NOTE — H&P
[facility]  Office: [address], DO, Lourdes Zoran, DO, Hayley Chris, DO, Girma Hendrix Blood, DO, Ramiro Chaparro MD, Kyleigh Crenshaw MD, Florinda Connolly MD, Emmanuel Rodriguez MD, Jamila Krause MD, Flori Chirinos MD, Crow Mallory MD, Humphrey Reid MD, Rohit Darby, DO, Kelly Sotelo MD, Mk Alas DO, Chiqui Alejo MD,  Jared Avila DO, Evangelina York MD, Lizzie Crigler, MD, Raúl Rebolledo MD, Brady Campbell MD, Jmi Peters, [facility], [place] [address] [address], [facility], Molly Roque, CNP, Grabiel Hutton, [facility], Nayeli Camp, CNP, Tj Connors, CNP, Danielle Carrasco, CNP, Jaquelin Green, CNP, Greg Rodriguez, CNP, Kathy Medrano PA-C, Otto Hankins, [facility], Alli Nicholas, CNP, Manasa Keith, CNP, Brooks Paredes, CNP, Robin Hong, CNP, Mark Anthony Haile, CNP, Rebeca Mccoy, CNP         [address]    HISTORY AND PHYSICAL EXAMINATION            Date:   4/21/2021  Patient name:  Ama Ko  Date of admission:  4/21/2021 11:00 AM  MRN:   [id number]  Account:  [de-identified]  YOB: 1955  PCP:    No primary care provider on file. Room:   [facility]  Code Status:    Prior    Chief Complaint:     Chief Complaint   Patient presents with    Abdominal Pain    Chest Pain    Headache       History Obtained From:     patient    History of Present Illness:     Emi Daniels is a 74 y.o. female with past medical history for COPD, diastolic heart failure, depression, chronic hepatitis, homelessness presents to the emergency department for right-sided chest pain and anxiety. Patient was previously seen earlier this month for a pancreatic mass which was biopsied. 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 She is recommended follow-up with general surgery which was supposed to be today.   She states she has been so anxious about her diagnosis that she start developing chest pain and shortness of breath. She is unable to go to her appointment today. She states she has difficulty with follow-up due to scheduling issue, does not recall having a conversation with gastroenterology even though this conversation had been charted. During my examination she has reproducible chest wall pain, shortness of breath is improved. She had episode of hypoglycemia while in the emergency department and was given D50, she was also noted to have an CRYSTAL and was admitted for this. General surgery was consulted for recommendations regarding chest, patient is homeless, may not follow-up outpatient.       Past Medical History:     Past Medical History:   Diagnosis Date    Appendicitis 3/27/2017    Cerebral artery occlusion with cerebral infarction ([address]     CHF (congestive heart failure) (HCC)     Chronic respiratory failure with hypoxia and hypercapnia (HCC)     Chronic rhinitis     Cigarette smoker     COPD (chronic obstructive pulmonary disease) ([address] 2018    Depression     Dysphagia     Essential hypertension 2018    GERD (gastroesophageal reflux disease)     Heart failure, diastolic, with acute decompensation ([address] 2018    Hepatitis C, chronic (HCC)     History of smoking at least 1 pack per day for at least 30 years     Hyperlipidemia     Hypertension     Migraine     Moderate COPD (chronic obstructive pulmonary disease) (HCC)     Multiple transfusions     Neurogenic dysphagia     Osteoarthritis     hands    Pneumonia     Scoliosis     Substance abuse ([address]     ivda and cocaine abuse in past        Past Surgical History:     Past Surgical History:   Procedure Laterality Date    APPENDECTOMY  2017     SECTION      GASTRIC BYPASS SURGERY      HIP SURGERY Right 2019    HIP TOTAL ARTHROPLASTY ANTERIOR APPROACH - MED[facility],  C-ARM, (Right )    LAPAROSCOPIC APPENDECTOMY N/A 3/27/2017    APPENDECTOMY LAPAROSCOPIC performed by Jaydon Siegel MD at [address] [facility]  2002    hgsil    MEDICATION INJECTION Left 3/25/2021    STEROID INJECTION LEFT HIP  WITH FLUORO performed by Ashwini Lawrence MD at [address] N/A 11/10/2018    COLONOSCOPY WITH BIOPSY performed by Stewart Voss MD at [facility]    SD EGD TRANSORAL BIOPSY SINGLE/MULTIPLE N/A 11/9/2018    EGD ESOPHAGOGASTRODUODENOSCOPY performed by Ameya Glass MD at [address] [facility] Right 8/13/2019    HIP TOTAL ARTHROPLASTY ANTERIOR APPROACH - [facility],  C-ARM, performed by Fullertonalec Velazquez DO at [address] [facility]  2008    oesophagitis, candidiasis    UPPER GASTROINTESTINAL ENDOSCOPY  11/05/2018    UPPER GASTROINTESTINAL ENDOSCOPY N/A 11/5/2018    EGD CONTROL HEMORRHAGE performed by Ameya Glass MD at [address] [facility] 4/5/2021    EGD W/EUS FNA performed by Joyice Koyanagi, MD at [facility]        Medications Prior to Admission:     Prior to Admission medications    Medication Sig Start Date End Date Taking? Authorizing Provider   famotidine (PEPCID) 20 MG tablet Take 1 tablet by mouth 2 times daily 4/6/21   Brittany Acevedo DO   mometasone-formoterol [facility]) 100-5 MCG/ACT inhaler Inhale 2 puffs into the lungs 2 times daily 4/6/21   Brittany Acevedo DO   mirtazapine (REMERON) 30 MG tablet Take 1 tablet by mouth nightly 4/6/21   Brittany Acevedo DO   lisinopril (PRINIVIL;ZESTRIL) 10 MG tablet Take 1 tablet by mouth daily 4/6/21   Brittany Acevedo DO   gabapentin (NEURONTIN) 300 MG capsule Take 1 capsule by mouth 3 times daily for 30 days.  4/6/21 5/6/21  Brittany Acevedo DO   ferrous sulfate (MELISSA-HAO) 325 (65 Fe) MG tablet Take 1 tablet by mouth daily 4/6/21   Brittany Acevedo DO   clopidogrel (PLAVIX) 75 MG tablet Take 1 tablet by mouth daily 4/6/21   Brittany Acevedo DO   carvedilol (COREG) 6.25 MG tablet Take 1 Result Value Ref Range    Lipase 54 13 - 60 U/L   LACTIC ACID, WHOLE BLOOD    Collection Time: 04/21/21 11:21 AM   Result Value Ref Range    Lactic Acid, Whole Blood 1.8 0.7 - 2.1 mmol/L   POC Glucose Fingerstick    Collection Time: 04/21/21 12:15 PM   Result Value Ref Range    POC Glucose 37 (LL) 65 - 105 mg/dL   POC Glucose Fingerstick    Collection Time: 04/21/21 12:30 PM   Result Value Ref Range    POC Glucose 214 (H) 65 - 105 mg/dL   POC Glucose Fingerstick    Collection Time: 04/21/21  1:25 PM   Result Value Ref Range    POC Glucose 171 (H) 65 - 105 mg/dL   Troponin    Collection Time: 04/21/21  2:53 PM   Result Value Ref Range    Troponin, High Sensitivity 19 (H) 0 - 14 ng/L    Troponin T NOT REPORTED <0.03 ng/mL    Troponin Interp NOT REPORTED        Imaging/Diagnostics:  Ct Abdomen Pelvis W Iv Contrast Additional Contrast? None    Result Date: 4/21/2021  No acute abdominal or pelvic abnormality. Redemonstration of a peripherally enhancing mass in the pancreatic body that is not significantly changed compared to prior examination. Assessment :      Hospital Problems           Last Modified POA    * (Principal) Acute kidney injury ([address] 4/21/2021 Yes    Essential hypertension 4/21/2021 Yes    History of substance abuse ([address] 4/21/2021 Yes    Chronic heart failure with reduced ejection fraction and diastolic dysfunction ([address] 5/71/6349 Yes    Alcoholic cirrhosis of liver without ascites ([address] 4/21/2021 Yes    COPD without exacerbation ([address] 4/21/2021 Yes    History of hepatitis C (Chronic) 4/21/2021 Yes    Overview Signed 8/11/2019  2:24 PM by Manuel Galan DO     Previously treated at [facility] 4/21/2021 Yes    Right-sided chest wall pain 4/21/2021 Yes    H/O malignant gastrointestinal stromal tumor (GIST) 4/21/2021 Yes          Plan:     Patient status observation in the Med/Surge    1. Acute kidney Injury -creatinine 1.46 with a baseline 0.54,  start IV fluids, hold lisinopril.   CT abdomen pelvis showing no obstruction. Could be from hypotension versus ACE inhibitor  2. Right-sided chest pain -chronic issue, did not think ACS. Worsens with palpation and deep inspiration, pain management  3. Homelessness -patient readmitted after recent discharge, unable to follow-up with appointments. Consult social work for recommendations as patient seems unable to take care of herself  4. GIST -gastroenterology seems to have contacted her regarding results with patient have no recollection. Patient is homeless, unable to follow-up outpatient and seems to have poor memory. Will consult general surgery to see if resection could possibly be performed inpatient  5. Hypoglycemia -given amp of D50, continue to monitor. 6. Severe protein calorie malnutritionlikely related to her homelessness  7. COPD  8. CVA  9. Tobacco abuse  10. Essential hypertension  11. Chronic hepatitis C    Consultations:   IP CONSULT TO HOSPITALIST  IP CONSULT TO SOCIAL WORK  IP CONSULT TO GENERAL SURGERY     Patient is admitted as inpatient status because of co-morbidities listed above, severity of signs and symptoms as outlined, requirement for current medical therapies and most importantly because of direct risk to patient if care not provided in a hospital setting. Expected length of stay > 48 hours. Cyn Melena, DO  4/21/2021  4:26 PM    Copy sent to Dr. Rebecca Palma primary care provider on file.

## 2021-04-21 NOTE — ED NOTES
Per surgery resident and Dr Aly Mcadams, starting heparin possible surgery in the morning. Surgery at bedside at 1819.      Claribel Mckeon RN  04/21/21 3911

## 2021-04-21 NOTE — Clinical Note
Patient Class: Observation [104]   REQUIRED: Diagnosis: Acute kidney injury ([facility] [017302]   Estimated Length of Stay: Estimated stay of less than 2 midnights   Admitting Provider: Whit Rashid [[id number]]   Telemetry Bed Required?: Yes

## 2021-04-21 NOTE — ED NOTES
Leonel Barger [place], confirmed patient may need SNF placement. Writer to relay to case management.       DAMARIS Gomez  04/21/21 0262

## 2021-04-21 NOTE — ED PROVIDER NOTES
[facility] ED  Emergency Department        Pt Name: Mikayla Vela  MRN: [id number]  [place] 1955  Date of evaluation: 21    CHIEF COMPLAINT       Chief Complaint   Patient presents with    Abdominal Pain    Chest Pain    Headache       HISTORY OF PRESENT ILLNESS  (Location/Symptom, Timing/Onset, Context/Setting, Quality, Duration, ModifyingFactors, Severity.)      Emi Daniels is a 74 y.o. female who presents with abdominal pain, upper and epigastric, with radiation into her back. Was seen in hospital admitted, and had Biopsy of pancreatic mass on . Was supposed to follow up with doctor yesterday with doctor, biopsy showed GIST tumor. Was unable to get tide yesterday. She started with abdominal pain yesterday. She feels nauseated and has emesis today. Patient felt fever and chills. +diarrhea/   Patient also with headache, ongoing for a few days, hs h/o migraines and this is otherwise unchanged. PAST MEDICAL / SURGICAL / SOCIAL / FAMILY HISTORY      has a past medical history of Appendicitis, Cerebral artery occlusion with cerebral infarction ([address] CHF (congestive heart failure) (HCC), Chronic respiratory failure with hypoxia and hypercapnia (HCC), Chronic rhinitis, Cigarette smoker, COPD (chronic obstructive pulmonary disease) ([address] Depression, Dysphagia, Essential hypertension, GERD (gastroesophageal reflux disease), Heart failure, diastolic, with acute decompensation ([address] Hepatitis C, chronic ([address] History of smoking at least 1 pack per day for at least 30 years, Hyperlipidemia, Hypertension, Migraine, Moderate COPD (chronic obstructive pulmonary disease) ([address] Multiple transfusions, Neurogenic dysphagia, Osteoarthritis, Pneumonia, Scoliosis, and Substance abuse ([address] has a past surgical history that includes  section; LEEP (); Upper gastrointestinal endoscopy (); Gastric bypass surgery;  Appendectomy (2017); laparoscopic appendectomy (N/A, 3/27/2017); Upper gastrointestinal endoscopy (2018); Upper gastrointestinal endoscopy (N/A, 2018); pr colonoscopy w/biopsy single/multiple (N/A, 11/10/2018); pr egd transoral biopsy single/multiple (N/A, 2018); hip surgery (Right, 2019); Total hip arthroplasty (Right, 2019); Medication Injection (Left, 3/25/2021); and Upper gastrointestinal endoscopy (N/A, 2021).        Social History     Socioeconomic History    Marital status: Single     Spouse name: Not on file    Number of children: Not on file    Years of education: Not on file    Highest education level: Not on file   Occupational History    Not on file   Social Needs    Financial resource strain: Not on file    Food insecurity     Worry: Not on file     Inability: Not on file    Transportation needs     Medical: Not on file     Non-medical: Not on file   Tobacco Use    Smoking status: Former Smoker     Packs/day: 1.00     Years: 40.00     Pack years: 40.00     Types: Cigarettes     Start date:      Quit date: 2018     Years since quittin.4    Smokeless tobacco: Never Used   Substance and Sexual Activity    Alcohol use: No     Alcohol/week: 0.0 standard drinks    Drug use: No     Types: IV, Cocaine     Comment: past history    Sexual activity: Not on file   Lifestyle    Physical activity     Days per week: Not on file     Minutes per session: Not on file    Stress: Not on file   Relationships    Social connections     Talks on phone: Not on file     Gets together: Not on file     Attends [employer] service: Not on file     Active member of club or organization: Not on file     Attends meetings of clubs or organizations: Not on file     Relationship status: Not on file    Intimate partner violence     Fear of current or ex partner: Not on file     Emotionally abused: Not on file     Physically abused: Not on file     Forced sexual activity: Not on file   Other Topics Concern    Not on file   Social History Narrative    Not on file       Family History   Problem Relation Age of Onset    Breast Cancer Mother     Heart Disease Father        Allergies:  Aspirin and Keflet [cephalexin]    Home Medications:  Prior to Admission medications    Medication Sig Start Date End Date Taking? Authorizing Provider   famotidine (PEPCID) 20 MG tablet Take 1 tablet by mouth 2 times daily 4/6/21   Yelitza Sherwood, DO   mometasone-formoterol [facility]) 100-5 MCG/ACT inhaler Inhale 2 puffs into the lungs 2 times daily 4/6/21   Yelitza Sherwood, DO   mirtazapine (REMERON) 30 MG tablet Take 1 tablet by mouth nightly 4/6/21   Yelitza Sherwood, DO   lisinopril (PRINIVIL;ZESTRIL) 10 MG tablet Take 1 tablet by mouth daily 4/6/21   Yelitza Sherwood, DO   gabapentin (NEURONTIN) 300 MG capsule Take 1 capsule by mouth 3 times daily for 30 days.  4/6/21 5/6/21  Yelitza Sherwood, DO   ferrous sulfate (MELISSA-HAO) 325 (65 Fe) MG tablet Take 1 tablet by mouth daily 4/6/21   Yelitza Sherwood, DO   clopidogrel (PLAVIX) 75 MG tablet Take 1 tablet by mouth daily 4/6/21   Yelitza Sherwood, DO   carvedilol (COREG) 6.25 MG tablet Take 1 tablet by mouth 2 times daily (with meals) 4/6/21   Yelitza Sherwood, DO   atorvastatin (LIPITOR) 40 MG tablet Take 1 tablet by mouth nightly 4/6/21   Yelitza Sherwood, DO   apixaban (ELIQUIS) 5 MG TABS tablet Take 1 tablet by mouth 2 times daily 4/6/21   Yelitza Sherwood, DO   amitriptyline (ELAVIL) 10 MG tablet Take 1 tablet by mouth nightly 4/6/21   Yelitza Sherwood, DO   albuterol sulfate HFA (PROVENTIL HFA) 108 (90 Base) MCG/ACT inhaler Inhale 1-2 puffs into the lungs once for 1 dose 4/6/21 4/6/21  Yelitza Sherwood, DO   butalbital-acetaminophen-caffeine (FIORICET, ESGIC) -41 MG per tablet Take 1 tablet by mouth every 12 hours for 5 doses 3/28/21 3/31/21  Debbie Dc MD   Elastic Bandages & Supports (LUMBAR BACK BRACE/SUPPORT PAD) MISC 1 Units by Does not apply route daily 2/10/21 2/10/22  Edna Pompa MD   amLODIPine (NORVASC) 10 MG tablet Take 1 tablet by mouth daily 2/9/21 2/9/21  Heraclio Caldwell MD   Sennosides (SENNA) 8.6 MG CAPS 8.6 capsules 8/2/20   Historical Provider, MD   Misc. Devices [facility]) MISC 1 Device by Does not apply route once for 1 dose 8/5/19 8/5/19  Velvet Holm PA-C   melatonin 1 MG tablet Take 1 tablet by mouth nightly as needed for Sleep 1/11/19   Forrest Santos MD   ipratropium-albuterol (DUONEB) 0.5-2.5 (3) MG/3ML SOLN nebulizer solution Inhale 3 mLs into the lungs every 4 hours as needed for Shortness of Breath 1/11/19   Forrest Santos MD   folic acid (FOLVITE) 1 MG tablet Take 1 tablet by mouth daily 10/13/18   Khadijah Zamudio MD   vitamin B-1 100 MG tablet Take 1 tablet by mouth daily 10/13/18   Khadijah Zamudio MD   vitamin B-12 250 MCG tablet Take 1 tablet by mouth daily 10/13/18   Khadijah Zamudio MD   Multiple Vitamins-Minerals (THERAPEUTIC MULTIVITAMIN-MINERALS) tablet Take 1 tablet by mouth daily 8/12/18   Hyman Apley, MD       REVIEW OF SYSTEMS    (2-9 systems for level 4, 10 or more for level 5)      Review of Systems   Constitutional: Negative for activity change, appetite change, fatigue and fever. HENT: Negative for congestion, rhinorrhea and sore throat. Respiratory: Negative for cough, shortness of breath and wheezing. Cardiovascular: Negative for chest pain, palpitations and leg swelling. Gastrointestinal: Positive for abdominal pain, diarrhea, nausea and vomiting. Negative for abdominal distention and constipation. Genitourinary: Negative for decreased urine volume and dysuria. Skin: Negative for rash. Neurological: Positive for headaches. Negative for dizziness, weakness, light-headedness and numbness.        PHYSICAL EXAM   (up to 7 for level 4, 8 or more for level 5)     INITIAL VITALS:   BP (!) 160/81   Pulse 87   Temp 99.3 °F (37.4 °C) (Oral)   Resp (!) 35   Ht 4' 11\" (1.499 m)   Wt 94 lb 8 oz (42.9 kg)   LMP 03/18/2015   SpO2 97%   BMI 19.09 kg/m²     Physical Exam  Vitals signs and nursing note reviewed. Constitutional:       General: She is not in acute distress. Appearance: She is well-developed. Comments: Non toxic appearing, speaking in full sentences without signs of distress   HENT:      Head: Normocephalic and atraumatic. Eyes:      General:         Right eye: No discharge. Left eye: No discharge. Conjunctiva/sclera: Conjunctivae normal.   Cardiovascular:      Rate and Rhythm: Normal rate and regular rhythm. Heart sounds: Normal heart sounds. No murmur. No friction rub. No gallop. Pulmonary:      Effort: Pulmonary effort is normal. No respiratory distress. Breath sounds: Normal breath sounds. No wheezing or rales. Chest:      Chest wall: No tenderness. Abdominal:      General: There is no distension. Palpations: Abdomen is soft. There is no mass. Tenderness: There is abdominal tenderness in the right upper quadrant and epigastric area. There is no right CVA tenderness, left CVA tenderness, guarding or rebound. Negative signs include Tompkins's sign, Rovsing's sign, McBurney's sign, psoas sign and obturator sign. Skin:     General: Skin is warm and dry. Findings: No rash. Neurological:      Mental Status: She is alert and oriented to person, place, and time. DIFFERENTIAL  DIAGNOSIS     With right upper quadrant and epigastric abdominal tenderness on palpation dry heaving initially in the room. Plan for labs, check liver function test.  Patient with multiple comorbidities, known pancreatic mass and pathology showed a GIST tumor. Which patient was notified about but missed her follow-up yesterday. We will plan on further evaluation as well as pain control.     PLAN (LABS / IMAGING / EKG):  Orders Placed This Encounter   Procedures    CT ABDOMEN PELVIS W IV CONTRAST Additional Contrast? None    CBC    COMPREHENSIVE METABOLIC PANEL    LIPASE    LACTIC ACID, WHOLE BLOOD    Troponin    Urinalysis with microscopic    Telemetry occasionally words are mis-transcribed.)              Angel Job, DO  04/21/21 [id number]

## 2021-04-21 NOTE — CARE COORDINATION
Case Management Initial Discharge Plan  Emi Daniels,             Met with:patient to discuss discharge plans. Information verified: address, contacts, phone number, , insurance Yes    Emergency Contact/Next of Kin name & number: Mihir Delgado - son no phone    PCP: list given  Date of last visit:     Insurance Provider: [employer]    Discharge Planning    Living Arrangements:  Family Members   Support Systems:  Family Members    Home has 1 stories  0 stairs to climb to get into front door, elevatorstairs to climb to reach second floor  Location of bedroom/bathroom in home main    Patient able to perform ADL's:Independent    Current Services (outpatient & in home) none  DME equipment: none  DME provider: none    Receiving oral anticoagulation therapy? No    If indicated:   Physician managing anticoagulation treatment: n/a  Where does patient obtain lab work for ATC treatment? n/a      Potential Assistance Needed:  N/A    Patient agreeable to home care: No  [facility] of choice provided:  n/a    Prior SNF/Rehab Placement and Facility: none  Agreeable to SNF/Rehab: No  [facility] of choice provided: n/a     Evaluation: yes    Expected Discharge date:       Patient expects to be discharged to:  home  Follow Up Appointment: Best Day/ Time:      Transportation provider: alex  Transportation arrangements needed for discharge: Yes    Readmission Risk              Risk of Unplanned Readmission:        44             Does patient have a readmission risk score greater than 14?: Yes  If yes, follow-up appointment must be made within 7 days of discharge.      Goals of Care: get better      Discharge Plan: Pt states she is homeless however she is staying with brother currently, will need cab home          Electronically signed by Cyn Valladares RN on 21 at 8:00 PM EDT

## 2021-04-21 NOTE — ED NOTES
Lab called with a BS of 46, rechecked at [facility] BS was 37, gave 50mL IV 50% dextrose.      Indigo Ram RN  04/21/21 6629

## 2021-04-21 NOTE — ED PROVIDER NOTES
[facility] ED  Emergency Department  Emergency Medicine Resident Sign-out     Care of Emi Daniels was assumed from Dr. Ulises Lau and is being seen for Abdominal Pain, Chest Pain, and Headache  . The patient's initial evaluation and plan have been discussed with the prior provider who initially evaluated the patient. EMERGENCY DEPARTMENT COURSE / MEDICAL DECISION MAKING:       MEDICATIONS GIVEN:  Orders Placed This Encounter   Medications    0.9 % sodium chloride bolus    morphine injection 4 mg    ondansetron (ZOFRAN) injection 4 mg    dextrose 50 % IV solution    iopamidol (ISOVUE-370) 76 % injection 75 mL    morphine injection 4 mg       LABS / RADIOLOGY:     Labs Reviewed   CBC - Abnormal; Notable for the following components:       Result Value    Platelets 295 (*)     All other components within normal limits   COMPREHENSIVE METABOLIC PANEL - Abnormal; Notable for the following components:    Glucose 46 (*)     CREATININE 1.46 (*)     Potassium 3.6 (*)     CO2 16 (*)     Anion Gap 20 (*)     AST 39 (*)     Total Protein 8.4 (*)     GFR Non- 36 (*)     GFR  43 (*)     All other components within normal limits   POC GLUCOSE FINGERSTICK - Abnormal; Notable for the following components:    POC Glucose 37 (*)     All other components within normal limits   POC GLUCOSE FINGERSTICK - Abnormal; Notable for the following components:    POC Glucose 214 (*)     All other components within normal limits   POC GLUCOSE FINGERSTICK - Abnormal; Notable for the following components:    POC Glucose 171 (*)     All other components within normal limits   LIPASE   LACTIC ACID, WHOLE BLOOD   TROPONIN   URINALYSIS WITH MICROSCOPIC       Us Gi Endoscopic S&i    Result Date: 4/5/2021  Radiology exam is complete. No Radiologist dictation. Please follow up with ordering provider.      Ct Abdomen Pelvis W Iv Contrast Additional Contrast? None    Result Date: 4/21/2021 EXAMINATION: CT OF THE ABDOMEN AND PELVIS WITH CONTRAST 4/21/2021 12:58 pm TECHNIQUE: CT of the abdomen and pelvis was performed with the administration of intravenous contrast. Multiplanar reformatted images are provided for review. Dose modulation, iterative reconstruction, and/or weight based adjustment of the mA/kV was utilized to reduce the radiation dose to as low as reasonably achievable. COMPARISON: CT abdomen and pelvis performed 04/03/2021. HISTORY: ORDERING SYSTEM PROVIDED HISTORY: abdominal pain, recent FNA of pancreatic mass, on Anticoagulation TECHNOLOGIST PROVIDED HISTORY: abdominal pain, recent FNA of pancreatic mass, on Anticoagulation Decision Support Exception->Emergency Medical Condition (MA) Reason for Exam: abdominal pain, recent FNA of pancreatic mass, on Anticoagulation Acuity: Unknown Type of Exam: Unknown FINDINGS: Lower Chest: The lung bases are without consolidation or effusion. Visualized abdominal structures are unremarkable. Organs: The liver and spleen are normal size and overall attenuation. The gallbladder and adrenal glands are unremarkable. There is redemonstration of a peripherally enhancing mass in the pancreatic body that is not significantly changed compared to prior exam.  The kidneys are without obstructive uropathy. The ureters are not dilated. The urinary bladder is unremarkable. GI/Bowel: The stomach is fluid-filled and otherwise unremarkable loops of small bowel are normal in caliber without evidence for obstruction. The colon contains air and fecal residue and is otherwise unremarkable. There is no intraperitoneal free air or free fluid. Dorena Manoj Pelvis: Uterus appears age-appropriate. Peritoneum/Retroperitoneum: The psoas muscles are symmetric. The abdominal aorta is normal in caliber. The inferior vena cava is unremarkable. There is no retroperitoneal or mesenteric adenopathy. Bones/Soft Tissues: The extra-abdominal soft tissues are unremarkable.   There is no acute radiograph HISTORY: ORDERING SYSTEM PROVIDED HISTORY: upper abdominal pain TECHNOLOGIST PROVIDED HISTORY: upper abdominal pain Reason for Exam: upper abdominal pain FINDINGS: No intraperitoneal free air. Nonspecific but nonobstructive bowel gas pattern. No abnormal calcifications overlying the gallbladder urinary tract. No mass effect. Partially visualized right total hip arthroplasty appears intact. There is severe/advanced left hip osteoarthrosis with evidence for AVN to include flattening/deformity of the left femoral head. Nonspecific but nonobstructive bowel gas pattern. RECENT VITALS:     Temp: 99.3 °F (37.4 °C),  Pulse: 87, Resp: (!) 35, BP: (!) 160/81, SpO2: 97 %      This patient is a 74 y.o. Female with abdominal pain and history of pancreatic cancer coming in today with worsening symptoms as well as nausea vomiting. She found to have CRYSTAL hypoglycemia with sugar in the 30s no history of diabetes. CT abdomen pelvis to demonstrate stable pancreatic tumor. Admission to [facility] service for CRYSTAL hypoglycemia monitoring, pain control. The excepted admission at time of signout. ED Course as of Apr 21 1448   Wed Apr 21, 2021   1413 AKIHypoglycemiaPancreatic tumor  Pain control    [RB]   [id number] D/w [facility], has accepted    [RB]      ED Course User Index  [RB] Caleb Campbell DO       OUTSTANDING TASKS / RECOMMENDATIONS:    1. Awaiting admit orders     FINAL IMPRESSION:     1. CRYSTAL (acute kidney injury) ([facility] [address]    2. Hypoglycemia    3. Gastrointestinal stromal tumor (GIST) ([facility] [address]    4. Right upper quadrant abdominal pain        DISPOSITION:         DISPOSITION:  []  Discharge   []  Transfer -    [x]  Admission -     []  Against Medical Advice   []  Eloped   FOLLOW-UP: No follow-up provider specified.    DISCHARGE MEDICATIONS: New Prescriptions    No medications on file          DO Dr. Lexi Betancourt, [facility] Medicine Resident PGY - [id number] [facility] [id number] [facility] [address] [facility],   Resident  04/21/21 2652

## 2021-04-22 ENCOUNTER — APPOINTMENT (OUTPATIENT)
Dept: GENERAL RADIOLOGY | Age: 66
DRG: 469 | End: 2021-04-22
Payer: MEDICARE

## 2021-04-22 LAB
AFP: 3 UG/L
CA 19-9: <1 U/ML (ref 0–35)
CARCINOEMBRYONIC ANTIGEN: 3 NG/ML
GLUCOSE BLD-MCNC: 113 MG/DL (ref 65–105)
GLUCOSE BLD-MCNC: 127 MG/DL (ref 65–105)

## 2021-04-22 PROCEDURE — 2580000003 HC RX 258: Performed by: INTERNAL MEDICINE

## 2021-04-22 PROCEDURE — 6370000000 HC RX 637 (ALT 250 FOR IP): Performed by: NURSE PRACTITIONER

## 2021-04-22 PROCEDURE — 97530 THERAPEUTIC ACTIVITIES: CPT

## 2021-04-22 PROCEDURE — 93005 ELECTROCARDIOGRAM TRACING: CPT | Performed by: INTERNAL MEDICINE

## 2021-04-22 PROCEDURE — 86301 IMMUNOASSAY TUMOR CA 19-9: CPT

## 2021-04-22 PROCEDURE — 76937 US GUIDE VASCULAR ACCESS: CPT

## 2021-04-22 PROCEDURE — 94640 AIRWAY INHALATION TREATMENT: CPT

## 2021-04-22 PROCEDURE — 82947 ASSAY GLUCOSE BLOOD QUANT: CPT

## 2021-04-22 PROCEDURE — 6370000000 HC RX 637 (ALT 250 FOR IP): Performed by: INTERNAL MEDICINE

## 2021-04-22 PROCEDURE — 97162 PT EVAL MOD COMPLEX 30 MIN: CPT

## 2021-04-22 PROCEDURE — 1200000000 HC SEMI PRIVATE

## 2021-04-22 PROCEDURE — 82105 ALPHA-FETOPROTEIN SERUM: CPT

## 2021-04-22 PROCEDURE — 82378 CARCINOEMBRYONIC ANTIGEN: CPT

## 2021-04-22 PROCEDURE — 36415 COLL VENOUS BLD VENIPUNCTURE: CPT

## 2021-04-22 PROCEDURE — 99233 SBSQ HOSP IP/OBS HIGH 50: CPT | Performed by: INTERNAL MEDICINE

## 2021-04-22 PROCEDURE — 99255 IP/OBS CONSLTJ NEW/EST HI 80: CPT | Performed by: INTERNAL MEDICINE

## 2021-04-22 RX ORDER — SODIUM CHLORIDE 0.9 % (FLUSH) 0.9 %
5-40 SYRINGE (ML) INJECTION PRN
Status: DISCONTINUED | OUTPATIENT
Start: 2021-04-22 | End: 2021-04-27 | Stop reason: HOSPADM

## 2021-04-22 RX ORDER — SODIUM CHLORIDE 0.9 % (FLUSH) 0.9 %
5-40 SYRINGE (ML) INJECTION EVERY 12 HOURS SCHEDULED
Status: DISCONTINUED | OUTPATIENT
Start: 2021-04-22 | End: 2021-04-27 | Stop reason: HOSPADM

## 2021-04-22 RX ORDER — LIDOCAINE HYDROCHLORIDE 10 MG/ML
5 INJECTION, SOLUTION INFILTRATION; PERINEURAL ONCE
Status: DISCONTINUED | OUTPATIENT
Start: 2021-04-22 | End: 2021-04-27 | Stop reason: HOSPADM

## 2021-04-22 RX ORDER — HYDROCODONE BITARTRATE AND ACETAMINOPHEN 5; 325 MG/1; MG/1
1 TABLET ORAL EVERY 4 HOURS PRN
Status: DISCONTINUED | OUTPATIENT
Start: 2021-04-22 | End: 2021-04-27 | Stop reason: HOSPADM

## 2021-04-22 RX ORDER — SODIUM CHLORIDE 9 MG/ML
25 INJECTION, SOLUTION INTRAVENOUS PRN
Status: DISCONTINUED | OUTPATIENT
Start: 2021-04-22 | End: 2021-04-27 | Stop reason: HOSPADM

## 2021-04-22 RX ADMIN — BUDESONIDE AND FORMOTEROL FUMARATE DIHYDRATE 2 PUFF: 160; 4.5 AEROSOL RESPIRATORY (INHALATION) at 20:46

## 2021-04-22 RX ADMIN — HYDROCODONE BITARTRATE AND ACETAMINOPHEN 1 TABLET: 5; 325 TABLET ORAL at 05:08

## 2021-04-22 RX ADMIN — SODIUM CHLORIDE, PRESERVATIVE FREE 10 ML: 5 INJECTION INTRAVENOUS at 21:01

## 2021-04-22 RX ADMIN — MIRTAZAPINE 30 MG: 30 TABLET, FILM COATED ORAL at 21:32

## 2021-04-22 RX ADMIN — CLOPIDOGREL 75 MG: 75 TABLET, FILM COATED ORAL at 08:34

## 2021-04-22 RX ADMIN — BUDESONIDE AND FORMOTEROL FUMARATE DIHYDRATE 2 PUFF: 160; 4.5 AEROSOL RESPIRATORY (INHALATION) at 08:55

## 2021-04-22 RX ADMIN — HYDROCODONE BITARTRATE AND ACETAMINOPHEN 1 TABLET: 5; 325 TABLET ORAL at 11:22

## 2021-04-22 RX ADMIN — GABAPENTIN 300 MG: 300 CAPSULE ORAL at 20:59

## 2021-04-22 RX ADMIN — FERROUS SULFATE TAB EC 325 MG (65 MG FE EQUIVALENT) 325 MG: 325 (65 FE) TABLET DELAYED RESPONSE at 08:35

## 2021-04-22 RX ADMIN — FAMOTIDINE 20 MG: 20 TABLET, FILM COATED ORAL at 08:35

## 2021-04-22 RX ADMIN — SODIUM CHLORIDE: 9 INJECTION, SOLUTION INTRAVENOUS at 21:03

## 2021-04-22 RX ADMIN — HYDROCODONE BITARTRATE AND ACETAMINOPHEN 1 TABLET: 5; 325 TABLET ORAL at 16:42

## 2021-04-22 RX ADMIN — HYDROCODONE BITARTRATE AND ACETAMINOPHEN 1 TABLET: 5; 325 TABLET ORAL at 21:00

## 2021-04-22 RX ADMIN — CARVEDILOL 6.25 MG: 12.5 TABLET, FILM COATED ORAL at 16:45

## 2021-04-22 RX ADMIN — FAMOTIDINE 20 MG: 20 TABLET, FILM COATED ORAL at 21:00

## 2021-04-22 RX ADMIN — AMITRIPTYLINE HYDROCHLORIDE 10 MG: 10 TABLET, FILM COATED ORAL at 21:31

## 2021-04-22 RX ADMIN — HYDROXYZINE HYDROCHLORIDE 25 MG: 25 TABLET ORAL at 21:03

## 2021-04-22 RX ADMIN — Medication 1 TABLET: at 11:22

## 2021-04-22 RX ADMIN — ATORVASTATIN CALCIUM 40 MG: 80 TABLET, FILM COATED ORAL at 21:00

## 2021-04-22 RX ADMIN — CARVEDILOL 6.25 MG: 12.5 TABLET, FILM COATED ORAL at 08:34

## 2021-04-22 RX ADMIN — FOLIC ACID 1 MG: 1 TABLET ORAL at 08:35

## 2021-04-22 RX ADMIN — GABAPENTIN 300 MG: 300 CAPSULE ORAL at 11:21

## 2021-04-22 ASSESSMENT — PAIN SCALES - GENERAL
PAINLEVEL_OUTOF10: 10
PAINLEVEL_OUTOF10: 7
PAINLEVEL_OUTOF10: 4
PAINLEVEL_OUTOF10: 8

## 2021-04-22 ASSESSMENT — PAIN DESCRIPTION - LOCATION
LOCATION: ABDOMEN
LOCATION: ABDOMEN;CHEST

## 2021-04-22 ASSESSMENT — PAIN DESCRIPTION - ORIENTATION
ORIENTATION: RIGHT;UPPER
ORIENTATION: RIGHT

## 2021-04-22 ASSESSMENT — PAIN DESCRIPTION - PAIN TYPE
TYPE: CHRONIC PAIN
TYPE: CHRONIC PAIN

## 2021-04-22 ASSESSMENT — ENCOUNTER SYMPTOMS
SHORTNESS OF BREATH: 0
COUGH: 0
VOMITING: 0
NAUSEA: 0
ABDOMINAL PAIN: 0

## 2021-04-22 ASSESSMENT — PAIN DESCRIPTION - DESCRIPTORS: DESCRIPTORS: ACHING;CONSTANT;DISCOMFORT

## 2021-04-22 ASSESSMENT — PAIN DESCRIPTION - FREQUENCY: FREQUENCY: CONTINUOUS

## 2021-04-22 ASSESSMENT — PAIN - FUNCTIONAL ASSESSMENT: PAIN_FUNCTIONAL_ASSESSMENT: ACTIVITIES ARE NOT PREVENTED

## 2021-04-22 ASSESSMENT — PAIN DESCRIPTION - ONSET: ONSET: ON-GOING

## 2021-04-22 NOTE — PROGRESS NOTES
FOLLOW UP: Yes  Activity Tolerance  Activity Tolerance: Patient limited by endurance;Treatment limited secondary to agitation;Patient limited by fatigue  Activity Tolerance: Pt can easily become angry but then is pleasant. Patient Diagnosis(es): The primary encounter diagnosis was CRYSTAL (acute kidney injury) ([address] Diagnoses of Hypoglycemia, Gastrointestinal stromal tumor (GIST) ([address] and Right upper quadrant abdominal pain were also pertinent to this visit. has a past medical history of Appendicitis, Cerebral artery occlusion with cerebral infarction ([address] CHF (congestive heart failure) ([address] Chronic respiratory failure with hypoxia and hypercapnia (HCC), Chronic rhinitis, Cigarette smoker, COPD (chronic obstructive pulmonary disease) ([address] Depression, Dysphagia, Essential hypertension, GERD (gastroesophageal reflux disease), Heart failure, diastolic, with acute decompensation ([address] Hepatitis C, chronic ([address] History of smoking at least 1 pack per day for at least 30 years, Hyperlipidemia, Hypertension, Migraine, Moderate COPD (chronic obstructive pulmonary disease) ([address] Multiple transfusions, Neurogenic dysphagia, Osteoarthritis, Pneumonia, Scoliosis, and Substance abuse ([address] has a past surgical history that includes  section; LEEP (); Upper gastrointestinal endoscopy (); Gastric bypass surgery; Appendectomy (2017); laparoscopic appendectomy (N/A, 3/27/2017); Upper gastrointestinal endoscopy (2018); Upper gastrointestinal endoscopy (N/A, 2018); pr colonoscopy w/biopsy single/multiple (N/A, 11/10/2018); pr egd transoral biopsy single/multiple (N/A, 2018); hip surgery (Right, 2019); Total hip arthroplasty (Right, 2019); Medication Injection (Left, 3/25/2021); and Upper gastrointestinal endoscopy (N/A, 2021).     Restrictions  Restrictions/Precautions  Restrictions/Precautions: Up as Tolerated  Required Braces or Orthoses?: No  Vision/Hearing Vision: Impaired  Vision Exceptions: Wears glasses at all times  Hearing: Within functional limits     Subjective  General  Patient assessed for rehabilitation services?: Yes  Family / Caregiver Present: No  Follows Commands: Within Functional Limits  General Comment  Comments: Pt supine in bed. Subjective  Subjective: Pt states she is very tired. Pain Screening  Patient Currently in Pain: Yes  Pain Assessment  Pain Assessment: 0-10  Pain Level: 7  Pain Type: Chronic pain  Pain Location: Abdomen; Chest  Pain Orientation: Right  Vital Signs  Patient Currently in Pain: Yes       Orientation  Orientation  Overall Orientation Status: Within Functional Limits  Social/Functional History  Social/Functional History  Lives With: Family  Type of Home: Apartment  Home Layout: Multi-level, One level  Home Access: Elevator  Bathroom Shower/Tub: Tub/Shower unit  Bathroom Toilet: Standard  Bathroom Equipment: Grab bars in shower  Home Equipment: Rolling walker, Wheelchair-manual, Cane(Uses cane)  ADL Assistance: Independent  Ambulation Assistance: Independent  Transfer Assistance: Independent  Active : No  Patient's  Info: Mera Mendez Mode of Transportation: Cab, Walk  Cognition   Cognition  Overall Cognitive Status: Exceptions  Arousal/Alertness: Appropriate responses to stimuli  Following Commands: Follows all commands without difficulty  Attention Span: Attends with cues to redirect  Safety Judgement: Decreased awareness of need for assistance  Insights: Decreased awareness of deficits    Objective          AROM RLE (degrees)  RLE AROM: WFL  AROM LLE (degrees)  LLE AROM : WFL  AROM RUE (degrees)  RUE AROM : WFL  AROM LUE (degrees)  LUE AROM : WFL  Strength Other  Other: Moves all extremities antigravity. Did not MMT d/t patient can become very irritable and easily angry.   Motor Control  Gross Motor?: WFL  Sensation  Overall Sensation Status: WFL  Bed mobility  Rolling to Left: Stand by assistance  Supine to Sit: Stand by assistance  Sit to Supine: Stand by assistance  Scooting: Stand by assistance  Transfers  Sit to Stand: Stand by assistance  Stand to sit: Stand by assistance  Ambulation  Ambulation?: Yes  Ambulation 1  Surface: level tile  Device: Rolling Walker  Assistance: Stand by assistance  Gait Deviations: Slow Geovanna;Decreased step length  Distance: 25 ft  Comments: Pt did not want to be touched and refused to ambulate out in the hallway. Balance  Sitting - Static: Good  Sitting - Dynamic: Good  Standing - Static: Good  Standing - Dynamic: Good;-  Comments: Standing balance with rolling walker. Plan   Plan  Times per week: 5x/week  Current Treatment Recommendations: Strengthening, Transfer Training, Endurance Training, Functional Mobility Training, Gait Training, Balance Training, Safety Education & Training  Safety Devices  Type of devices: Call light within reach, Left in bed, Nurse notified    AM-PAC Score     AM-PAC Inpatient Mobility without Stair Climbing Raw Score : 20 (04/22/21 1435)  AM-PAC Inpatient without Stair Climbing T-Scale Score : 60.57 (04/22/21 1435)  Mobility Inpatient CMS 0-100% Score: 0 (04/22/21 1435)  Mobility Inpatient without Stair CMS G-Code Modifier : [address] (04/22/21 [facility])       Goals  Short term goals  Time Frame for Short term goals: 10 visits  Short term goal 1: Independent bed mobility  Short term goal 2: Independent transfers  Short term goal 3: Independent ambulation with cane. Short term goal 4: Pt to tolerate 30 minutes of activity to improve strength and endurance. Patient Goals   Patient goals : Move without pain.        Therapy Time   Individual Concurrent Group Co-treatment   Time In 1400         Time Out 1420         Minutes 20         Timed Code Treatment Minutes: 112 [facility]  IRVIN, PT

## 2021-04-22 NOTE — PROGRESS NOTES
General Surgery:  Daily Progress Note                 PATIENT NAME: Emi Daniels     TODAY'S DATE: 4/22/2021   CC:  Chest pain, abdominal pain     SUBJECTIVE:     Pt seen and examined at bedside. Refusing blood draws overnight. Regular rate, slight HTN. States she is having some abdominal pain on the right side this AM. Denies nausea or emesis and ate a sandwich last night. Says she believes she is passing some flatus but no recent bowel movement. OBJECTIVE:   VITALS:  BP (!) 147/87   Pulse 87   Temp 99.3 °F (37.4 °C) (Oral)   Resp 23   Ht 4' 11\" (1.499 m)   Wt 94 lb 8 oz (42.9 kg)   LMP 03/18/2015   SpO2 96%   BMI 19.09 kg/m²      INTAKE/OUTPUT:      Intake/Output Summary (Last 24 hours) at 4/22/2021 0801  Last data filed at 4/21/2021 1308  Gross per 24 hour   Intake 1000 ml   Output    Net 1000 ml       PHYSICAL EXAM:  General Appearance:  awake, alert, oriented, in no acute distress  HEENT:  Normocephalic, atraumatic, mucus membranes moist   Skin:  Skin color, texture, turgor normal. No rashes or lesions. Lungs:  Normal effort, no respiratory distress, no accessory muscle use   Heart:  Regular rate and rhythm   Abdomen: Soft, nondistended, right sided TTP without guarding or peritoneal signs   Extremities: Extremities warm to touch, pink, with no edema.       Data:  CBC:   Lab Results   Component Value Date    WBC 5.1 04/21/2021    RBC 4.24 04/21/2021    RBC 4.15 05/04/2012    HGB 13.7 04/21/2021    HCT 42.2 04/21/2021    MCV 99.5 04/21/2021    MCH 32.3 04/21/2021    MCHC 32.5 04/21/2021    RDW 13.3 04/21/2021     04/21/2021     05/04/2012    MPV 9.0 04/21/2021     BMP:    Lab Results   Component Value Date     04/21/2021    K 3.6 04/21/2021     04/21/2021    CO2 16 04/21/2021    BUN 23 04/21/2021    LABALBU 4.2 04/21/2021    LABALBU 4.4 11/14/2011    CREATININE 1.46 04/21/2021    CALCIUM 9.3 04/21/2021    GFRAA 43 04/21/2021    LABGLOM 36 04/21/2021    GLUCOSE 46 04/21/2021    GLUCOSE 76 05/04/2012       ASSESSMENT:  Active Hospital Problems    Diagnosis Date Noted    Right-sided chest wall pain [R07.89] 04/21/2021    Acute kidney injury ([facility] [N17.9] 04/21/2021    H/O malignant gastrointestinal stromal tumor (GIST) [Z85.09] 04/21/2021    Homeless [Z59.0] 04/04/2021    History of hepatitis C [Z86.19] 08/11/2019    COPD without exacerbation ([facility] [address] [J44.9] 46/12/0833    Alcoholic cirrhosis of liver without ascites ([facility] [address] [K70.30]     Chronic heart failure with reduced ejection fraction and diastolic dysfunction ([facility] [address] [I50.42] 10/23/2018    Essential hypertension [I10] 10/11/2018    History of substance abuse [facility]) [F19.11] 10/11/2018       74 y.o. female presents with chest pain. Found to have CRYSTAL. General surgery consulted due to recent diagnosis of GIST tumor      Plan:  1. Medical management per primary team  2. Patient with recent diagnosis of GIST tumor. Patient was able to tolerate a sandwich last night and states she is passing some flatus. Will dc SBFT as patient does not appear to be acutely obstructed. Ok for diet as tolerated. 3. Due to the pancreatic involvement of the tumor, the patient will need to follow-up with a hepatobiliary surgeon to discuss the possibility of resection.        Boris Rawls DO  General Surgery PGY-2      Attending Physician Statement  I have discussed the case with Dr Johanna Grissom, including pertinent history and exam findings with the resident. I have seen and examined the patient and the key elements of the encounter have been performed by me. I agree with the assessment, plan and orders as documented by the resident.       Electronically signed by David Farfan IV, DO  on 4/26/2021 at 9:30 AM

## 2021-04-22 NOTE — ED NOTES
Patient assisted to bathroom via w/c  Tolerated well  Denies needs at this time  Will continue to monitor     Didier Valdez RN  04/22/21 [id number]

## 2021-04-22 NOTE — PROGRESS NOTES
[facility]    Office: [address], DO, Dat Brown, DO, Janet Peter, DO, Robby Cuevas Blood, DO, Neelam Isaac MD, Marli Reyes MD, Arcelia Lehman MD, Abbie Jimenez MD, Ashok Rizvi MD, Ethan Sorenson MD, Danny Robles MD, Tony Rivas MD, Jenelle Valladares MD, Brain Morales DO, Carlos Ng MD, Mohan Alvarenga MD, Maine Nina DO, Cordelia Barthel, MD,  Mainor Perez DO, Carmen Fleming MD, Vickey Miller MD, Tori Whyte, [facility], [facility] Porras, CNP, Tra Lopez CNP, Ministerio Figueroa, CNS, Reyna Moe, CNP, Husam Cunningham, CNP, Jacklyn Ordaz, CNP, Matt Madrid, CNP, Becky Esparza, CNP, Susan Larson PA-C, Sandra Akers, [facility], Stephanie Calles, CNP, Tony Che, CNP, Ryley Ballard, CNP, Jayleen Leblanc, CNP, Christopher Ponce, [facility]   [address] [facility]    Progress Note    4/22/2021    2:45 PM    Name:   Abbi Song  MRN:     [id number]     Acct:      [de-identified]   Room:   [id number]   Day:  1  Admit Date:  4/21/2021 11:00 AM    PCP:   No primary care provider on file. Code Status:  Full Code    Subjective:     C/C:   Chief Complaint   Patient presents with    Abdominal Pain    Chest Pain    Headache       Interval History Status:  Still reporting right-sided rib pain  The pain is radiating to her thoracic region  Appetite improved  Blood sugars labile    Data Base Updates:  Troponin, High [id number]       Brief History:     As documented in the medical record:  \"Emi Daniels is a 74 y.o. female with past medical history for COPD, diastolic heart failure, depression, chronic hepatitis, homelessness presents to the emergency department for right-sided chest pain and anxiety. Patient was previously seen earlier this month for a pancreatic mass which was biopsied.   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 She is recommended follow-up with general surgery which was supposed to be today. She states she has been so anxious about her diagnosis that she start developing chest pain and shortness of breath. She is unable to go to her appointment today. She states she has difficulty with follow-up due to scheduling issue, does not recall having a conversation with gastroenterology even though this conversation had been charted. During my examination she has reproducible chest wall pain, shortness of breath is improved. She had episode of hypoglycemia while in the emergency department and was given D50, she was also noted to have an CRYSTAL and was admitted for this. General surgery was consulted for recommendations regarding chest, patient is homeless, may not follow-up outpatient. The initial assessment and plan included:  Acute kidney Injury -creatinine 1.46 with a baseline 0.54,  start IV fluids, hold lisinopril. CT abdomen pelvis showing no obstruction. Could be from hypotension versus ACE inhibitor    Right-sided chest pain -chronic issue, did not think ACS. Worsens with palpation and deep inspiration, pain management    Homelessness -patient readmitted after recent discharge, unable to follow-up with appointments. Consult social work for recommendations as patient seems unable to take care of herself    GIST -gastroenterology seems to have contacted her regarding results with patient have no recollection. Patient is homeless, unable to follow-up outpatient and seems to have poor memory. Will consult general surgery to see if resection could possibly be performed inpatient    Hypoglycemia -given amp of D50, continue to monitor.     Severe protein calorie malnutritionlikely related to her homelessness  COPD  CVA  Tobacco abuse  Essential hypertension  Chronic hepatitis C\"     Troponin, High [id number]     Results for Angel John (MRN [id number]) as of 4/22/2021 14:15   Ref. Range 10/20/2020 17:50 4/3/2021 19:33 4/4/2021 19:06 4/6/2021 08:37 4/21/2021 11:21   Creatinine Latest Ref Range: 0.50 - 0.90 mg/dL 0.92 (H) 0.56 0.64 0.54 1.46 (H)     MRI 1/2020  Chronic microvascular disease with a focus of restricted diffusion in the   left posterior frontal lobe consistent with acute/subacute ischemia.       Mild chronic sinusitis in the maxillary sinuses. CT abdomen 4/21/2021  No acute abdominal or pelvic abnormality.       Redemonstration of a peripherally enhancing mass in the pancreatic body that   is not significantly changed compared to prior examination. Medications: Allergies:     Allergies   Allergen Reactions    Aspirin Itching    Keflet [Cephalexin]      Tolerated penicillin V 12/13, tolerates cephalexin 6/14       Current Meds:   Scheduled Meds:    lidocaine 1 % injection  5 mL Intradermal Once    sodium chloride flush  5-40 mL Intravenous 2 times per day    amitriptyline  10 mg Oral Nightly    atorvastatin  40 mg Oral Nightly    carvedilol  6.25 mg Oral BID WC    clopidogrel  75 mg Oral Daily    famotidine  20 mg Oral BID    ferrous sulfate  325 mg Oral Daily    folic acid  1 mg Oral Daily    gabapentin  300 mg Oral TID    mirtazapine  30 mg Oral Nightly    budesonide-formoterol  2 puff Inhalation BID    therapeutic multivitamin-minerals  1 tablet Oral Daily    sodium chloride flush  5-40 mL Intravenous 2 times per day     Continuous Infusions:    sodium chloride      sodium chloride      dextrose      sodium chloride Stopped (04/22/21 1122)    heparin (PORCINE) Infusion Stopped (04/22/21 0050)     PRN Meds: sodium chloride flush, sodium chloride, ipratropium-albuterol, sodium chloride flush, sodium chloride, potassium chloride **OR** potassium alternative oral replacement **OR** potassium chloride, magnesium sulfate, promethazine **OR** ondansetron, polyethylene glycol, acetaminophen **OR** acetaminophen, hydrOXYzine, glucose, dextrose, glucagon (rDNA), dextrose, heparin (porcine), heparin (porcine), HYDROcodone 5 mg - acetaminophen    Data:     Past Medical History:   has a past medical history of Appendicitis, Cerebral artery occlusion with cerebral infarction ([address] CHF (congestive heart failure) ([address] Chronic respiratory failure with hypoxia and hypercapnia (HCC), Chronic rhinitis, Cigarette smoker, COPD (chronic obstructive pulmonary disease) ([address] Depression, Dysphagia, Essential hypertension, GERD (gastroesophageal reflux disease), Heart failure, diastolic, with acute decompensation ([address] Hepatitis C, chronic ([address] History of smoking at least 1 pack per day for at least 30 years, Hyperlipidemia, Hypertension, Migraine, Moderate COPD (chronic obstructive pulmonary disease) ([address] Multiple transfusions, Neurogenic dysphagia, Osteoarthritis, Pneumonia, Scoliosis, and Substance abuse ([address] Social History:   reports that she quit smoking about 2 years ago. Her smoking use included cigarettes. She started smoking about 51 years ago. She has a 40.00 pack-year smoking history. She has never used smokeless tobacco. She reports that she does not drink alcohol or use drugs. Family History:   Family History   Problem Relation Age of Onset    Breast Cancer Mother     Heart Disease Father        Review of Systems:     Review of Systems   Constitutional: Positive for activity change (Diminished) and appetite change (Decreased). Respiratory: Negative for cough and shortness of breath. Cardiovascular: Positive for chest pain (Right rib pain). Negative for palpitations. Gastrointestinal: Negative for abdominal pain, nausea and vomiting. Genitourinary: Negative for flank pain and hematuria. Musculoskeletal: Positive for arthralgias and myalgias. The patient has chronic arthralgias and myalgias        Physical Examination:        Physical Exam  Vitals signs reviewed.    Constitutional: General: She is not in acute distress. Appearance: She is not diaphoretic. HENT:      Head: Normocephalic. Nose: Nose normal.      Mouth/Throat:      Comments: Dentition poor  Eyes:      General: No scleral icterus. Conjunctiva/sclera: Conjunctivae normal.   Neck:      Musculoskeletal: Neck supple. Trachea: No tracheal deviation. Cardiovascular:      Rate and Rhythm: Normal rate and regular rhythm. Pulmonary:      Effort: Pulmonary effort is normal. No respiratory distress. Breath sounds: Normal breath sounds. No wheezing or rales. Chest:      Chest wall: No tenderness. Abdominal:      General: Bowel sounds are normal. There is no distension. Palpations: Abdomen is soft. Tenderness: There is no abdominal tenderness. Musculoskeletal:         General: No tenderness. Comments: Muscle mass decreased   Skin:     General: Skin is warm and dry. Neurological:      Mental Status: She is alert. Vitals:  BP (!) 141/66   Pulse 77   Temp 98.9 °F (37.2 °C) (Oral)   Resp 18   Ht 4' 11\" (1.499 m)   Wt 94 lb 12.8 oz (43 kg)   LMP 2015   SpO2 98%   BMI 19.15 kg/m²   Temp (24hrs), Av.9 °F (37.2 °C), Min:98.9 °F (37.2 °C), Max:98.9 °F (37.2 °C)    Recent Labs     21  1215 21  1230 21  1325 21  1159   POCGLU 37* 214* 171* 113*       I/O (24Hr):   No intake or output data in the 24 hours ending 21 1445    Labs:  Hematology:  Recent Labs     21  1121   WBC 5.1   RBC 4.24   HGB 13.7   HCT 42.2   MCV 99.5   MCH 32.3   MCHC 32.5   RDW 13.3   *   MPV 9.0     Chemistry:  Recent Labs     21  1121 21  1453     --    K 3.6*  --      --    CO2 16*  --    GLUCOSE 46*  --    BUN 23  --    CREATININE 1.46*  --    ANIONGAP 20*  --    LABGLOM 36*  --    GFRAA 43*  --    CALCIUM 9.3  --    TROPHS  --  19*   LACTACIDWB 1.8  --      Recent Labs     21  1121 21  1215 21  1230 21  1325 21 1159   PROT 8.4*  --   --   --   --    LABALBU 4.2  --   --   --   --    AST 39*  --   --   --   --    ALT 20  --   --   --   --    ALKPHOS 83  --   --   --   --    BILITOT 0.46  --   --   --   --    LIPASE 54  --   --   --   --    POCGLU  --  37* 214* 171* 113*     ABG:  Lab Results   Component Value Date    POCPH 7.366 01/08/2019    POCPCO2 51.0 01/08/2019    POCPO2 65.9 01/08/2019    POCHCO3 29.2 01/08/2019    NBEA NOT REPORTED 01/08/2019    PBEA 3 01/08/2019    TRJ2UEZ 31 01/08/2019    CDQP5CKR 92 01/08/2019    FIO2 NOT REPORTED 09/04/2019     Lab Results   Component Value Date/Time    SPECIAL NOT REPORTED 04/04/2021 12:09 AM     Lab Results   Component Value Date/Time    CULTURE NO SIGNIFICANT GROWTH 04/04/2021 12:09 AM       Radiology:  Ct Abdomen Pelvis W Iv Contrast Additional Contrast? None    Result Date: 4/21/2021  No acute abdominal or pelvic abnormality. Redemonstration of a peripherally enhancing mass in the pancreatic body that is not significantly changed compared to prior examination. Assessment:        Principal Problem:    Acute kidney injury ([facility]  Active Problems:    Essential hypertension    History of substance abuse ([facility] [facility]    Chronic heart failure with reduced ejection fraction and diastolic dysfunction (HCC)    Alcoholic cirrhosis of liver without ascites (HCC)    Pancreas head lesion    Avascular necrosis of bone of right hip (HCC)    COPD without exacerbation (HCC)    Hypokalemia    History of hepatitis C    Protein calorie malnutrition (HCC)    Avascular necrosis of left femur (HCC)    Multiple subsegmental pulmonary emboli without acute cor pulmonale (HCC)    Homeless    Right-sided chest wall pain    H/O malignant gastrointestinal stromal tumor (GIST)    Gastrointestinal stromal tumor (GIST) (HCC)    Hypoglycemia    History of pulmonary embolism  Resolved Problems:    * No resolved hospital problems.  *      Plan:        Admit  Check bun and creatinine  Lisinopril on hold  Pain control  Oncology evaluation - GIST, pancreatic mass, thrombocytosis  Check CEA  Check CA 199  Social service consult, the patient has been homeless  Blood Pressure - Monitor and control   Nutritional supplementation  Correct electrolyte abnormalities  Trend troponins, doubt non-ST elevation MI  Glycemic contol - monitor and control blood sugars   Hx of PE last July, Heparin initiated   Eliquis on hold    IP CONSULT TO HOSPITALIST  IP CONSULT TO SOCIAL WORK  IP CONSULT TO GENERAL SURGERY  IP CONSULT TO IV TEAM  IP CONSULT  [address], DO  4/22/2021  2:45 PM

## 2021-04-22 NOTE — FLOWSHEET NOTE
Assessment: The patient was calm and approachable. Intervention:  engaged in active listening.  asked if they would like prayer and informed them chaplains are available 24/7. Outcome: They engaged in the conversation. Chaplains will remain available to offer spiritual and emotional support as needed. 04/22/21 1314   Encounter Summary   Services provided to: Patient   Referral/Consult From: [facility]   (04/22/21)   Complexity of Encounter Moderate   Length of Encounter 15 minutes   Spiritual Assessment Completed Yes   Routine   Type Initial   Assessment Calm; Approachable   Intervention Active listening   Outcome Engaged in conversation

## 2021-04-22 NOTE — ED NOTES
Unsuccessful lab draw x 2 by Lakia Sero phlebotomist, pt refusing lab draws at this time, Navarro Zurita RN notified     [address] [place] [address]  04/22/21 0501

## 2021-04-22 NOTE — CONSULTS
Today's Date: 4/22/2021  Patient Name: Bennie Alexandre  Date of admission: 4/21/2021 11:00 AM  Patient's age: 74 y.o., 1955  Admission Dx: Right-sided chest wall pain [R07.89]  Acute kidney injury ([address] [N17.9]    Reason for Consult: management recommendations  Requesting Physician: Emma Zuniga DO    CHIEF COMPLAINT: Abdominal pain. Nausea vomiting. Weight loss. History Obtained From:  patient, electronic medical record    HISTORY OF PRESENT ILLNESS:      The patient is a 74 y.o.  female who presents with a emergency department with chief complaint of chest pain. Patient was recently diagnosed with just tumor on endoscopy performed on 4/5/2021. Patient was recently hospitalized with complaints of nausea and vomiting. Underwent CT scan which showed a 2.3 cm lesion arising from the mid pancreatic body. Patient underwent EGD with EUS EGD showed a small hiatal hernia otherwise was unremarkable. EUS showed lesion in the pancreatic body 2.3 x 1.8 mm well-defined borders. 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 Pathology report does not mention proliferative index. Patient again had a CT scan done in the ER which showed mildly distended stomach and duodenum. Did not show any obstruction. Patient does complain of weight loss over the last couple of months and recently had difficulties eating due to complaining of nausea. The tumor in the pancreas does not seem to cause any obstruction. Patient has been evaluated by surgery who are recommending low residue diet.   Patient's BMI is 19      Past Medical History:   has a past medical history of Appendicitis, Cerebral artery occlusion with cerebral infarction ([address] CHF (congestive heart failure) ([address] Chronic respiratory failure with hypoxia and hypercapnia (HCC), Chronic rhinitis, Cigarette smoker, COPD (chronic obstructive pulmonary disease) ([address] Depression, Dysphagia, Essential hypertension, GERD (gastroesophageal reflux disease), Heart failure, diastolic, with acute decompensation ([facility] Hepatitis C, chronic (HCC), History of smoking at least 1 pack per day for at least 30 years, Hyperlipidemia, Hypertension, Migraine, Moderate COPD (chronic obstructive pulmonary disease) ([facility] Multiple transfusions, Neurogenic dysphagia, Osteoarthritis, Pneumonia, Scoliosis, and Substance abuse ([facility] Past Surgical History:   has a past surgical history that includes  section; LEEP (); Upper gastrointestinal endoscopy (); Gastric bypass surgery; Appendectomy (2017); laparoscopic appendectomy (N/A, 3/27/2017); Upper gastrointestinal endoscopy (2018); Upper gastrointestinal endoscopy (N/A, 2018); pr colonoscopy w/biopsy single/multiple (N/A, 11/10/2018); pr egd transoral biopsy single/multiple (N/A, 2018); hip surgery (Right, 2019); Total hip arthroplasty (Right, 2019); Medication Injection (Left, 3/25/2021); and Upper gastrointestinal endoscopy (N/A, 2021). Medications:    Prior to Admission medications    Medication Sig Start Date End Date Taking? Authorizing Provider   famotidine (PEPCID) 20 MG tablet Take 1 tablet by mouth 2 times daily 21   Leonila Jackman DO   mometasone-formoterol [facility]) 100-5 MCG/ACT inhaler Inhale 2 puffs into the lungs 2 times daily 21   Leonila Jackman DO   mirtazapine (REMERON) 30 MG tablet Take 1 tablet by mouth nightly 21   Leonila Jackman DO   lisinopril (PRINIVIL;ZESTRIL) 10 MG tablet Take 1 tablet by mouth daily 21   Leonila Jackman DO   gabapentin (NEURONTIN) 300 MG capsule Take 1 capsule by mouth 3 times daily for 30 days.  21  Leonila Jackman DO   ferrous sulfate (MELISSA-HAO) 325 (65 Fe) MG tablet Take 1 tablet by mouth daily 21   Leonila Jackman DO   clopidogrel (PLAVIX) 75 MG tablet Take 1 tablet by mouth daily 21   Leonila Jackman DO   carvedilol (COREG) 6.25 MG tablet Take 1 tablet by mouth 2 times daily (with meals) 21   Leonila Jackman, DO Stopped at 04/22/21 0927    sodium chloride flush 0.9 % injection 5-40 mL  5-40 mL Intravenous PRN Lew Block, DO        0.9 % sodium chloride infusion  25 mL Intravenous PRN Lew Block, DO        HYDROcodone-acetaminophen [facility]) 5-325 MG per tablet 1 tablet  1 tablet Oral Q4H PRN Lew Block, DO   1 tablet at 04/22/21 1642    amitriptyline (ELAVIL) tablet 10 mg  10 mg Oral Nightly Chiki Garnett, APRN - NP        atorvastatin (LIPITOR) tablet 40 mg  40 mg Oral Nightly Arty Garnett, APRN - NP        carvedilol (COREG) tablet 6.25 mg  6.25 mg Oral BID WC Chiki Garnett, APRN - NP   6.25 mg at 04/22/21 1645    clopidogrel (PLAVIX) tablet 75 mg  75 mg Oral Daily Chiki Garnett, APRN - NP   75 mg at 04/22/21 0834    famotidine (PEPCID) tablet 20 mg  20 mg Oral BID Chiki Garnett, APRN - NP   20 mg at 04/22/21 0835    ferrous sulfate (FE TABS 325) EC tablet 325 mg  325 mg Oral Daily Chiki Garnett, APRN - NP   325 mg at 16/58/88 3275    folic acid (FOLVITE) tablet 1 mg  1 mg Oral Daily Chiki Garnett, APRN - NP   1 mg at 04/22/21 3824    gabapentin (NEURONTIN) capsule 300 mg  300 mg Oral TID Chiki Garnett APRN - NP   300 mg at 04/22/21 1121    ipratropium-albuterol (DUONEB) nebulizer solution 3 mL  1 vial Inhalation Q4H PRN Chiki Garnett, APRN - NP        mirtazapine (REMERON) tablet 30 mg  30 mg Oral Nightly Chiki Garnett, APRN - NP   30 mg at 04/21/21 2036    budesonide-formoterol (SYMBICORT) 160-4.5 MCG/ACT inhaler 2 puff  2 puff Inhalation BID Chiki Garnett APRN - NP   2 puff at 04/22/21 0855    therapeutic multivitamin-minerals 1 tablet  1 tablet Oral Daily Chiki Garnett APRN - NP   1 tablet at 04/22/21 1122    sodium chloride flush 0.9 % injection 5-40 mL  5-40 mL Intravenous 2 times per day JAYDON Pedro NP   Stopped at 04/22/21 0927    sodium chloride flush 0.9 % injection 10 mL  10 mL Intravenous PRN JAYDON Pedro - NP        0.9 % sodium chloride infusion  25 mL Intravenous PRN Theopolis Willett, APRN - NP        potassium chloride (KLOR-CON M) extended release tablet 40 mEq  40 mEq Oral PRN Theopolis Willett, APRN - NP        Or    potassium bicarb-citric acid (EFFER-K) effervescent tablet 40 mEq  40 mEq Oral PRN Thejohnis Willett, APRN - NP        Or   [facility] potassium chloride 10 mEq/100 mL IVPB (Peripheral Line)  10 mEq Intravenous PRN Thejohnis Willett, APRN - NP        magnesium sulfate 1000 mg in dextrose 5% 100 mL IVPB  1,000 mg Intravenous PRN Thejohnis Willett, APRN - NP        promethazine (PHENERGAN) tablet 12.5 mg  12.5 mg Oral Q6H PRN Belkysis Willett, APRN - NP        Or    ondansetron [facility]) injection 4 mg  4 mg Intravenous Q6H PRN Thejohnis Willett, APRN - NP        polyethylene glycol (GLYCOLAX) packet 17 g  17 g Oral Daily PRN Ankita Willett, APRN - NP        acetaminophen (TYLENOL) tablet 650 mg  650 mg Oral Q6H PRN [facility], APRN - NP        Or   [facility] acetaminophen (TYLENOL) suppository 650 mg  650 mg Rectal Q6H PRN Thejohnis Willett, APRN - NP        hydrOXYzine (ATARAX) tablet 25 mg  25 mg Oral TID PRN Thejohnis Willett, APRN - NP        glucose (GLUTOSE) 40 % oral gel 15 g  15 g Oral PRN Thejohnis Willett, APRN - NP        dextrose 50 % IV solution  12.5 g Intravenous PRN Thejohnis Willett, APRN - NP        glucagon (rDNA) injection 1 mg  1 mg Intramuscular PRN Thejohnis Willett, APRN - NP        dextrose 5 % solution  100 mL/hr Intravenous PRN Theopolis Willett, APRN - NP        0.9 % sodium chloride infusion   Intravenous Continuous Cyn Melena, DO   Stopped at 04/22/21 1122    heparin (porcine) injection 3,430 Units  80 Units/kg Intravenous PRN Cyn Melena, DO        heparin (porcine) injection 1,720 Units  40 Units/kg Intravenous PRN Cyn Melena, DO        heparin 25,000 units in dextrose 5% 250 mL (premix) infusion  5-30 Units/kg/hr Intravenous Continuous Cyn Melena, DO   Stopped at 04/22/21 0050       Allergies:  Aspirin and Keflet [cephalexin] Social History:   reports that she quit smoking about 2 years ago. Her smoking use included cigarettes. She started smoking about 51 years ago. She has a 40.00 pack-year smoking history. She has never used smokeless tobacco. She reports that she does not drink alcohol or use drugs. Family History: family history includes Breast Cancer in her mother; Heart Disease in her father. REVIEW OF SYSTEMS:      Constitutional: No fever or chills. No night sweats, positive weight loss. Eyes: No eye discharge, double vision, or eye pain   HEENT: negative for sore mouth, sore throat, hoarseness and voice change   Respiratory: negative for cough , sputum, dyspnea, wheezing, hemoptysis, chest pain   Cardiovascular: negative for chest pain, dyspnea, palpitations, orthopnea, PND   Gastrointestinal: Positive abdominal pain  Genitourinary: negative for frequency, dysuria, nocturia, urinary incontinence, and hematuria   Integument: negative for rash, skin lesions, bruises. Hematologic/Lymphatic: negative for easy bruising, bleeding, lymphadenopathy, or petechiae   Endocrine: negative for heat or cold intolerance,weight changes, change in bowel habits and hair loss   Musculoskeletal: negative for myalgias, arthralgias, pain, joint swelling,and bone pain   Neurological: negative for headaches, dizziness, seizures, weakness, numbness. Positive for memory.     PHYSICAL EXAM:        BP (!) 141/66   Pulse 77   Temp 98.9 °F (37.2 °C) (Oral)   Resp 18   Ht 4' 11\" (1.499 m)   Wt 94 lb 12.8 oz (43 kg)   LMP 2015   SpO2 98%   BMI 19.15 kg/m²    Temp (24hrs), Av.9 °F (37.2 °C), Min:98.9 °F (37.2 °C), Max:98.9 °F (37.2 °C)      General appearance -frail appearing, no in pain or distress   Mental status - alert and cooperative   Eyes - pupils equal and reactive, extraocular eye movements intact   Ears - bilateral TM's and external ear canals normal   Mouth - mucous membranes moist, pharynx normal without lesions   Neck - supple, no significant adenopathy   Lymphatics - no palpable lymphadenopathy, no hepatosplenomegaly   Chest - clear to auscultation, no wheezes, rales or rhonchi, symmetric air entry   Heart - normal rate, regular rhythm, normal S1, S2, no murmurs  Abdomen -soft. Nondistended.   Positive tender  Neurological - alert, oriented, normal speech, no focal findings or movement disorder noted   Musculoskeletal - no joint tenderness, deformity or swelling   Extremities - peripheral pulses normal, no pedal edema, no clubbing or cyanosis   Skin - normal coloration and turgor, no rashes, no suspicious skin lesions noted ,      DATA:      Labs:     Results for orders placed or performed during the hospital encounter of 04/21/21   CBC   Result Value Ref Range    WBC 5.1 3.5 - 11.3 k/uL    RBC 4.24 3.95 - 5.11 m/uL    Hemoglobin 13.7 11.9 - 15.1 g/dL    Hematocrit 42.2 36.3 - 47.1 %    MCV 99.5 82.6 - 102.9 fL    MCH 32.3 25.2 - 33.5 pg    MCHC 32.5 28.4 - 34.8 g/dL    RDW 13.3 11.8 - 14.4 %    Platelets 012 (H) 534 - 453 k/uL    MPV 9.0 8.1 - 13.5 fL    NRBC Automated 0.0 0.0 per 100 WBC   COMPREHENSIVE METABOLIC PANEL   Result Value Ref Range    Glucose 46 (L) 70 - 99 mg/dL    BUN 23 8 - 23 mg/dL    CREATININE 1.46 (H) 0.50 - 0.90 mg/dL    Bun/Cre Ratio NOT REPORTED 9 - 20    Calcium 9.3 8.6 - 10.4 mg/dL    Sodium 139 135 - 144 mmol/L    Potassium 3.6 (L) 3.7 - 5.3 mmol/L    Chloride 103 98 - 107 mmol/L    CO2 16 (L) 20 - 31 mmol/L    Anion Gap 20 (H) 9 - 17 mmol/L    Alkaline Phosphatase 83 35 - 104 U/L    ALT 20 5 - 33 U/L    AST 39 (H) <32 U/L    Total Bilirubin 0.46 0.3 - 1.2 mg/dL    Total Protein 8.4 (H) 6.4 - 8.3 g/dL    Albumin 4.2 3.5 - 5.2 g/dL    Albumin/Globulin Ratio 1.0 1.0 - 2.5    GFR Non- 36 (L) >60 mL/min    GFR  43 (L) >60 mL/min    GFR Comment          GFR Staging NOT REPORTED    LIPASE   Result Value Ref Range    Lipase 54 13 - 60 U/L   LACTIC ACID, WHOLE BLOOD   Result Value Ref Range    Lactic Acid, Whole Blood 1.8 0.7 - 2.1 mmol/L   Troponin   Result Value Ref Range    Troponin, High Sensitivity 19 (H) 0 - 14 ng/L    Troponin T NOT REPORTED <0.03 ng/mL    Troponin Interp NOT REPORTED    Urinalysis with microscopic   Result Value Ref Range    Color, UA YELLOW YELLOW    Turbidity UA CLEAR CLEAR    Glucose, Ur NEGATIVE NEGATIVE    Bilirubin Urine NEGATIVE NEGATIVE    Ketones, Urine NEGATIVE NEGATIVE    Specific Gravity, UA 1.047 (H) 1.005 - 1.030    Urine Hgb NEGATIVE NEGATIVE    pH, UA 6.5 5.0 - 8.0    Protein, UA NEGATIVE NEGATIVE    Urobilinogen, Urine Normal Normal    Nitrite, Urine NEGATIVE NEGATIVE    Leukocyte Esterase, Urine TRACE (A) NEGATIVE    -          WBC, UA 10 TO 20 0 - 5 /HPF    RBC, UA 2 TO 5 0 - 4 /HPF    Casts UA  0 - 8 /LPF     0 TO 2 HYALINE Reference range defined for non-centrifuged specimen. Crystals, UA NOT REPORTED None /HPF    Epithelial Cells UA 2 TO 5 0 - 5 /HPF    Renal Epithelial, UA NOT REPORTED 0 /HPF    Bacteria, UA NOT REPORTED None    Mucus, UA NOT REPORTED None    Trichomonas, UA NOT REPORTED None    Amorphous, UA NOT REPORTED None    Other Observations UA NOT REPORTED NOT REQ.     Yeast, UA NOT REPORTED None   APTT   Result Value Ref Range    PTT 24.0 20.5 - 30.5 sec   CEA   Result Value Ref Range    CEA 3.0 <3.9 ng/mL   CANCER ANTIGEN 19-9   Result Value Ref Range    CA 19-9 <1 0 - 35 U/mL   POC Glucose Fingerstick   Result Value Ref Range    POC Glucose 37 (LL) 65 - 105 mg/dL   POC Glucose Fingerstick   Result Value Ref Range    POC Glucose 214 (H) 65 - 105 mg/dL   POC Glucose Fingerstick   Result Value Ref Range    POC Glucose 171 (H) 65 - 105 mg/dL   POC Glucose Fingerstick   Result Value Ref Range    POC Glucose 113 (H) 65 - 105 mg/dL   EKG 12 Lead   Result Value Ref Range    Ventricular Rate 97 BPM    Atrial Rate 97 BPM    P-R Interval 144 ms    QRS Duration 68 ms    Q-T Interval 386 ms    QTc Calculation (Bazett) 490 ms    P Axis 99 degrees R Axis 68 degrees    T Axis 72 degrees         IMAGING DATA:    Us Gi Endoscopic S&i    Result Date: 4/5/2021  Radiology exam is complete. No Radiologist dictation. Please follow up with ordering provider. Ct Abdomen Pelvis W Iv Contrast Additional Contrast? None    Result Date: 4/21/2021  EXAMINATION: CT OF THE ABDOMEN AND PELVIS WITH CONTRAST 4/21/2021 12:58 pm TECHNIQUE: CT of the abdomen and pelvis was performed with the administration of intravenous contrast. Multiplanar reformatted images are provided for review. Dose modulation, iterative reconstruction, and/or weight based adjustment of the mA/kV was utilized to reduce the radiation dose to as low as reasonably achievable. COMPARISON: CT abdomen and pelvis performed 04/03/2021. HISTORY: ORDERING SYSTEM PROVIDED HISTORY: abdominal pain, recent FNA of pancreatic mass, on Anticoagulation TECHNOLOGIST PROVIDED HISTORY: abdominal pain, recent FNA of pancreatic mass, on Anticoagulation Decision Support Exception->Emergency Medical Condition (MA) Reason for Exam: abdominal pain, recent FNA of pancreatic mass, on Anticoagulation Acuity: Unknown Type of Exam: Unknown FINDINGS: Lower Chest: The lung bases are without consolidation or effusion. Visualized abdominal structures are unremarkable. Organs: The liver and spleen are normal size and overall attenuation. The gallbladder and adrenal glands are unremarkable. There is redemonstration of a peripherally enhancing mass in the pancreatic body that is not significantly changed compared to prior exam.  The kidneys are without obstructive uropathy. The ureters are not dilated. The urinary bladder is unremarkable. GI/Bowel: The stomach is fluid-filled and otherwise unremarkable loops of small bowel are normal in caliber without evidence for obstruction. The colon contains air and fecal residue and is otherwise unremarkable. There is no intraperitoneal free air or free fluid. Jair Bello  Pelvis: Uterus appears Fluoro For Surgical Procedures    Result Date: 3/25/2021  Radiology exam is complete. No Radiologist dictation. Please follow up with ordering provider. Xr Abdomen (2 Views)    Result Date: 3/29/2021  EXAMINATION: TWO XRAY VIEWS OF THE ABDOMEN 3/29/2021 10:12 am COMPARISON: July 19, 2020; same date chest radiograph HISTORY: ORDERING SYSTEM PROVIDED HISTORY: upper abdominal pain TECHNOLOGIST PROVIDED HISTORY: upper abdominal pain Reason for Exam: upper abdominal pain FINDINGS: No intraperitoneal free air. Nonspecific but nonobstructive bowel gas pattern. No abnormal calcifications overlying the gallbladder urinary tract. No mass effect. Partially visualized right total hip arthroplasty appears intact. There is severe/advanced left hip osteoarthrosis with evidence for AVN to include flattening/deformity of the left femoral head. Nonspecific but nonobstructive bowel gas pattern. Indication:   Pancreatic body lesion     Postprocedure diagnosis: Pancreatic body lesion-fine-needle biopsy performed     Anesthesia:  MAC     Complications: None     Specimen: Sent to the lab     Description of Procedure:  Informed consent was obtained from the patient after explanation of the procedure including indications, description of the procedure,  benefits and possible risks and complications of the procedure, and alternatives. Questions were answered. The patient's history was reviewed and a directed physical examination was performed prior to the procedure. Patient was monitored throughout the procedure with pulse oximetry and periodic assessment of vital signs. Patient was sedated as noted above. With the patient in the left lateral decubitus position, the Olympus videoendoscope followed by endoechoendoscope was placed in the patient's mouth and under direct visualization passed into the esophagus. The scope was passed to the 2nd portion of the duodenum.   The patient tolerated the procedure well and was taken to the recovery area in good condition. Estimated blood loss was none. EGD Findings[de-identified]   Esophagus: normal.   Stomach: Stomach had small sliding hiatal hernia. Stomach was otherwise normal.  Duodenum: normal     EUS findings:  Pancreas: There was a lesion near the pancreatic body measuring 23 mm x 18 mm. This had well-defined borders. There was a small calcifications within the lesion. This was solid-appearing. This was hypoechoic. Fine-needle biopsy of this lesion was performed via transgastric route with 22-gauge fine-needle biopsy needle. 4 passes were made. Preliminary cytology showed adequate cellularity. The rest of the pancreas otherwise appeared normal. Normal PD. No signs of chronic pancreatitis. CBD: Normal, no stones, sludge. CBD diameter: 5 mm. Left adrenal: Normal.   Ampulla: Normal.  Left lobe of liver: normal.   Lymphadenopathy: No pathologic lymphadenopathy seen in upper abdomen. IMPRESSION:   Primary Problem  Acute kidney injury [facility])    Active Hospital Problems    Diagnosis Date Noted    Gastrointestinal stromal tumor (GIST) ([address] Renato. A0]     Hypoglycemia [E16.2]     History of pulmonary embolism [Z86.711]     Right-sided chest wall pain [R07.89] 04/21/2021    Acute kidney injury ([address] [N17.9] 04/21/2021    H/O malignant gastrointestinal stromal tumor (GIST) [Z85.09] 04/21/2021    Homeless [Z59.0] 04/04/2021    Multiple subsegmental pulmonary emboli without acute cor pulmonale (HCC) [I26.94]     Avascular necrosis of left femur ([address] [M87.052] 06/15/2020    Protein calorie malnutrition ([address] [E46] 09/22/2019    History of hepatitis C [Z86.19] 08/11/2019    COPD without exacerbation ([address] [J44.9] 08/11/2019    Hypokalemia [E87.6] 08/11/2019    Avascular necrosis of bone of right hip ([address] [place] 08/10/2019    Pancreas head lesion [S36.200A] 29/90/1065    Alcoholic cirrhosis of liver without ascites ([address] [K70.30]     Chronic heart failure with reduced ejection fraction and diastolic dysfunction ([facility] [I50.42] 10/23/2018    Essential hypertension [I10] 10/11/2018    History of substance abuse ([facility] [F19.11] 10/11/2018         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 I personally reviewed results of lab work-up imaging studies and other relevant clinical data. 2. Discussed with Dr. Shanta Miguel from GI  3. Unclear etiology of patient abdominal pain. Continue symptom management  4. Discussed natural history of GIST tumor. Ideally we would recommend staging imaging studies preferably CT PET to rule out any distant disease. If no metastasis noted would recommend surgery or neoadjuvant imatinib followed by surgery. Depending upon risk of recurrence we would also recommend adjuvant imatinib  5. However patient social situation is very complicated. Per record patient is homeless. 6. Patient does not want surgery at this point  7. There is also issue with compliance with imatinib  8. Patient states that she does want treatment however compliance will be very challenging given patient's history  9. If patient is interested in further work-up we would set up outpatient CT PET  10. Patient has been off anticoagulation now started back on heparin. Okay to transition to oral anticoagulant from hematology oncology standpoint however compliance again is going to be an issue  11. Patient will need evaluation and treatment of hep C  12. We will check alpha-fetoprotein  13. Continue symptomatic supportive care      Discussed with family and Nurse. Thank you for asking us to see this patient.           Gera Nixon MD          This note is created with the assistance of a speech recognition program.  While intending to generate a document that actually reflects the content of the visit, the document can still have some errors including those of syntax and sound a like substitutions which may escape proof reading. It such instances, actual meaning can be extrapolated by contextual diversion.

## 2021-04-22 NOTE — ED NOTES
Pt refusing to let writer or Brenden RODRIGES draw labs to adjust heparin. Writer attempted to explain to pt importance of heparin drip and adjustment. Phlebotomy contacted to draw labs.      Kellen Escobedo RN  04/22/21 0052

## 2021-04-22 NOTE — ED NOTES
Patient resting on cart, quiet, calm  Breakfast tray arrives   Patient remains er boarder at this time    Denies needs or complaints at this time    Call light in reach     Justin Peña, [address] [facility]  04/22/21 7030

## 2021-04-22 NOTE — ED NOTES
IR calls, reports that PICC team is present and needs order put in for picc team and not IR    PICC order consult placed     Jolene Villanueva RN  04/22/21 6037

## 2021-04-22 NOTE — PROGRESS NOTES
This is a 74year old female pt, recently noted to be homeless, with multiple medical issues including COPD, CHF, hepatitis, and malnutrition who presented to [facility]'s ER due to complaints of chest pain and abdominal pain. Unfortunately patient has also been non-compliant, suspect due to issues acquiring transportation and questionable baseline mentation. She recently had a CT several weeks ago which demonstrated a cystic appearing lesion in the body of the pancreas. GI performed and EGD/EUS on 4/5/2021 which noted a [id number] lesion near the pancreatic body with well defined borders. 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 After discharge it was recommended she follow up with GI and per documentation she was unable to follow up. Yesterday upon admission she was hemodynamically stable and afebrile. Her abdomen was soft and non-distended on exam and she appeared to complain of tenderness along the right side of the abdomen although the exam was pretty inconsistent when distracting the patient. She did have a CT performed by the ER which demonstrated some mildly distended stomach and duodenum although there was no identifiable point of obstruction. The pt reports that recently she has lost a lot of weight over the last couple of months and that recently has had difficulties eating due to feeling nausea/early satiety. The GIST is visible on the CT in body of pancreas but this is not causing any obstruction visible on the scan. Overall, obtaining a history was extremely difficult as the patient was a very poor historian and seems to have questionable mentation/comprehension of any of these medical concerns explained to her. We did recommend a contrasted follow through study last night to see if there was any proximal small bowel obstruction; however patient has refused this. She was also refusing lab draws this morning.  Of note, she repeatedly asked for a sandwich during my evaluation and overnight was given a sandwich which she tolerated without any nausea, vomiting or abdominal pain. Surgery Plan:    · As she is tolerating a general diet and there is no identifiable obstruction on CT we will cancel any contrast study at this point. Would consider soft low residue diet only and supplemental ensure shakes TID. · Unclear if this is a GIST that arises in the stomach or in the pancreas but it does appear to be confined to the pancreatic body on CT. GIST arising in the pancreas would be extremely rare but has been reported. GI feels it is originating in the stomach which would seem more likely. Regardless, recommend following up with [facility] after discharge from the hospital as this tumor is directly involving pancreas and this resection would be out of our scope of practice. · Seems patient should be evaluated for placement in a nursing home as she has multiple medical issues, malnutrition and clearly cannot care for herself alone. This would also assist with making sure she makes it to follow up. · There is nothing acutely surgical from a general surgery to sign off at this time.        Electronically signed by Melany Sanders DO on 4/22/2021 at 9:49 AM

## 2021-04-22 NOTE — ED NOTES
Pt refusing blood draws at this time, being verbally aggressive with staff. Provider notified.      Danielle Doe RN  04/22/21 0079

## 2021-04-23 ENCOUNTER — APPOINTMENT (OUTPATIENT)
Dept: CT IMAGING | Age: 66
DRG: 469 | End: 2021-04-23
Payer: MEDICARE

## 2021-04-23 DIAGNOSIS — C49.A0 GASTROINTESTINAL STROMAL TUMOR (GIST) (HCC): Primary | ICD-10-CM

## 2021-04-23 PROBLEM — F14.90 CRACK COCAINE USE: Status: ACTIVE | Noted: 2021-04-23

## 2021-04-23 PROBLEM — Z91.199 NONCOMPLIANCE: Status: ACTIVE | Noted: 2021-04-23

## 2021-04-23 LAB
ALBUMIN SERPL-MCNC: 3.2 G/DL (ref 3.5–5.2)
ANION GAP SERPL CALCULATED.3IONS-SCNC: 8 MMOL/L (ref 9–17)
BUN BLDV-MCNC: 15 MG/DL (ref 8–23)
BUN/CREAT BLD: ABNORMAL (ref 9–20)
CALCIUM SERPL-MCNC: 7.8 MG/DL (ref 8.6–10.4)
CHLORIDE BLD-SCNC: 115 MMOL/L (ref 98–107)
CO2: 19 MMOL/L (ref 20–31)
CREAT SERPL-MCNC: 1.12 MG/DL (ref 0.5–0.9)
EKG ATRIAL RATE: 97 BPM
EKG P AXIS: 99 DEGREES
EKG P-R INTERVAL: 144 MS
EKG Q-T INTERVAL: 386 MS
EKG QRS DURATION: 68 MS
EKG QTC CALCULATION (BAZETT): 490 MS
EKG R AXIS: 68 DEGREES
EKG T AXIS: 72 DEGREES
EKG VENTRICULAR RATE: 97 BPM
GFR AFRICAN AMERICAN: 59 ML/MIN
GFR NON-AFRICAN AMERICAN: 48 ML/MIN
GFR SERPL CREATININE-BSD FRML MDRD: ABNORMAL ML/MIN/{1.73_M2}
GFR SERPL CREATININE-BSD FRML MDRD: ABNORMAL ML/MIN/{1.73_M2}
GLUCOSE BLD-MCNC: 102 MG/DL (ref 65–105)
GLUCOSE BLD-MCNC: 132 MG/DL (ref 65–105)
GLUCOSE BLD-MCNC: 136 MG/DL (ref 65–105)
GLUCOSE BLD-MCNC: 166 MG/DL (ref 65–105)
GLUCOSE BLD-MCNC: 96 MG/DL (ref 70–99)
HCT VFR BLD CALC: 30.9 % (ref 36.3–47.1)
HEMOGLOBIN: 9.9 G/DL (ref 11.9–15.1)
MAGNESIUM: 1.2 MG/DL (ref 1.6–2.6)
MCH RBC QN AUTO: 32.1 PG (ref 25.2–33.5)
MCHC RBC AUTO-ENTMCNC: 32 G/DL (ref 28.4–34.8)
MCV RBC AUTO: 100.3 FL (ref 82.6–102.9)
NRBC AUTOMATED: 0 PER 100 WBC
PDW BLD-RTO: 13.9 % (ref 11.8–14.4)
PHOSPHORUS: 2.4 MG/DL (ref 2.6–4.5)
PLATELET # BLD: 563 K/UL (ref 138–453)
PMV BLD AUTO: 9.1 FL (ref 8.1–13.5)
POTASSIUM SERPL-SCNC: 4 MMOL/L (ref 3.7–5.3)
RBC # BLD: 3.08 M/UL (ref 3.95–5.11)
SODIUM BLD-SCNC: 142 MMOL/L (ref 135–144)
WBC # BLD: 4.4 K/UL (ref 3.5–11.3)

## 2021-04-23 PROCEDURE — 6370000000 HC RX 637 (ALT 250 FOR IP): Performed by: INTERNAL MEDICINE

## 2021-04-23 PROCEDURE — 82947 ASSAY GLUCOSE BLOOD QUANT: CPT

## 2021-04-23 PROCEDURE — 99233 SBSQ HOSP IP/OBS HIGH 50: CPT | Performed by: INTERNAL MEDICINE

## 2021-04-23 PROCEDURE — 2580000003 HC RX 258: Performed by: INTERNAL MEDICINE

## 2021-04-23 PROCEDURE — 94640 AIRWAY INHALATION TREATMENT: CPT

## 2021-04-23 PROCEDURE — 99222 1ST HOSP IP/OBS MODERATE 55: CPT | Performed by: FAMILY MEDICINE

## 2021-04-23 PROCEDURE — 6370000000 HC RX 637 (ALT 250 FOR IP): Performed by: NURSE PRACTITIONER

## 2021-04-23 PROCEDURE — 97535 SELF CARE MNGMENT TRAINING: CPT | Performed by: OCCUPATIONAL THERAPIST

## 2021-04-23 PROCEDURE — 97165 OT EVAL LOW COMPLEX 30 MIN: CPT | Performed by: OCCUPATIONAL THERAPIST

## 2021-04-23 PROCEDURE — 6360000004 HC RX CONTRAST MEDICATION: Performed by: INTERNAL MEDICINE

## 2021-04-23 PROCEDURE — 80069 RENAL FUNCTION PANEL: CPT

## 2021-04-23 PROCEDURE — 99232 SBSQ HOSP IP/OBS MODERATE 35: CPT | Performed by: INTERNAL MEDICINE

## 2021-04-23 PROCEDURE — 83735 ASSAY OF MAGNESIUM: CPT

## 2021-04-23 PROCEDURE — 36415 COLL VENOUS BLD VENIPUNCTURE: CPT

## 2021-04-23 PROCEDURE — 85027 COMPLETE CBC AUTOMATED: CPT

## 2021-04-23 PROCEDURE — 1200000000 HC SEMI PRIVATE

## 2021-04-23 PROCEDURE — 94761 N-INVAS EAR/PLS OXIMETRY MLT: CPT

## 2021-04-23 PROCEDURE — 74160 CT ABDOMEN W/CONTRAST: CPT

## 2021-04-23 PROCEDURE — 6360000002 HC RX W HCPCS: Performed by: NURSE PRACTITIONER

## 2021-04-23 RX ORDER — BUTALBITAL, ACETAMINOPHEN AND CAFFEINE 50; 325; 40 MG/1; MG/1; MG/1
1 TABLET ORAL ONCE
Status: COMPLETED | OUTPATIENT
Start: 2021-04-23 | End: 2021-04-23

## 2021-04-23 RX ADMIN — CARVEDILOL 6.25 MG: 12.5 TABLET, FILM COATED ORAL at 09:23

## 2021-04-23 RX ADMIN — IOPAMIDOL 100 ML: 755 INJECTION, SOLUTION INTRAVENOUS at 17:30

## 2021-04-23 RX ADMIN — SODIUM CHLORIDE, PRESERVATIVE FREE 10 ML: 5 INJECTION INTRAVENOUS at 20:42

## 2021-04-23 RX ADMIN — ATORVASTATIN CALCIUM 40 MG: 80 TABLET, FILM COATED ORAL at 20:39

## 2021-04-23 RX ADMIN — ONDANSETRON 4 MG: 2 INJECTION INTRAMUSCULAR; INTRAVENOUS at 20:42

## 2021-04-23 RX ADMIN — BUTALBITAL, ACETAMINOPHEN AND CAFFEINE 1 TABLET: 50; 325; 40 TABLET ORAL at 11:44

## 2021-04-23 RX ADMIN — HYDROCODONE BITARTRATE AND ACETAMINOPHEN 1 TABLET: 5; 325 TABLET ORAL at 19:35

## 2021-04-23 RX ADMIN — MIRTAZAPINE 30 MG: 30 TABLET, FILM COATED ORAL at 20:40

## 2021-04-23 RX ADMIN — CARVEDILOL 6.25 MG: 12.5 TABLET, FILM COATED ORAL at 18:26

## 2021-04-23 RX ADMIN — HYDROCODONE BITARTRATE AND ACETAMINOPHEN 1 TABLET: 5; 325 TABLET ORAL at 15:32

## 2021-04-23 RX ADMIN — HYDROCODONE BITARTRATE AND ACETAMINOPHEN 1 TABLET: 5; 325 TABLET ORAL at 06:46

## 2021-04-23 RX ADMIN — GABAPENTIN 300 MG: 300 CAPSULE ORAL at 09:27

## 2021-04-23 RX ADMIN — Medication 1 TABLET: at 09:22

## 2021-04-23 RX ADMIN — GABAPENTIN 300 MG: 300 CAPSULE ORAL at 15:32

## 2021-04-23 RX ADMIN — AMITRIPTYLINE HYDROCHLORIDE 10 MG: 10 TABLET, FILM COATED ORAL at 20:40

## 2021-04-23 RX ADMIN — CLOPIDOGREL 75 MG: 75 TABLET, FILM COATED ORAL at 09:23

## 2021-04-23 RX ADMIN — GABAPENTIN 300 MG: 300 CAPSULE ORAL at 20:40

## 2021-04-23 RX ADMIN — BUDESONIDE AND FORMOTEROL FUMARATE DIHYDRATE 2 PUFF: 160; 4.5 AEROSOL RESPIRATORY (INHALATION) at 21:30

## 2021-04-23 RX ADMIN — BUDESONIDE AND FORMOTEROL FUMARATE DIHYDRATE 2 PUFF: 160; 4.5 AEROSOL RESPIRATORY (INHALATION) at 08:50

## 2021-04-23 RX ADMIN — FAMOTIDINE 20 MG: 20 TABLET, FILM COATED ORAL at 23:31

## 2021-04-23 RX ADMIN — FERROUS SULFATE TAB EC 325 MG (65 MG FE EQUIVALENT) 325 MG: 325 (65 FE) TABLET DELAYED RESPONSE at 09:27

## 2021-04-23 RX ADMIN — FOLIC ACID 1 MG: 1 TABLET ORAL at 09:23

## 2021-04-23 RX ADMIN — HYDROCODONE BITARTRATE AND ACETAMINOPHEN 1 TABLET: 5; 325 TABLET ORAL at 10:39

## 2021-04-23 RX ADMIN — HYDROCODONE BITARTRATE AND ACETAMINOPHEN 1 TABLET: 5; 325 TABLET ORAL at 23:32

## 2021-04-23 RX ADMIN — FAMOTIDINE 20 MG: 20 TABLET, FILM COATED ORAL at 09:23

## 2021-04-23 ASSESSMENT — ENCOUNTER SYMPTOMS
NAUSEA: 0
ABDOMINAL PAIN: 1
VOMITING: 0
COUGH: 0
SHORTNESS OF BREATH: 0

## 2021-04-23 ASSESSMENT — PAIN SCALES - GENERAL
PAINLEVEL_OUTOF10: 7
PAINLEVEL_OUTOF10: 4
PAINLEVEL_OUTOF10: 4
PAINLEVEL_OUTOF10: 8
PAINLEVEL_OUTOF10: 9
PAINLEVEL_OUTOF10: 7
PAINLEVEL_OUTOF10: 8
PAINLEVEL_OUTOF10: 8
PAINLEVEL_OUTOF10: 4

## 2021-04-23 ASSESSMENT — PAIN DESCRIPTION - LOCATION: LOCATION: ABDOMEN;HIP

## 2021-04-23 NOTE — PROGRESS NOTES
Scoliosis, and Substance abuse ([facility] [address] has a past surgical history that includes  section; LEEP (); Upper gastrointestinal endoscopy (); Gastric bypass surgery; Appendectomy (2017); laparoscopic appendectomy (N/A, 3/27/2017); Upper gastrointestinal endoscopy (2018); Upper gastrointestinal endoscopy (N/A, 2018); pr colonoscopy w/biopsy single/multiple (N/A, 11/10/2018); pr egd transoral biopsy single/multiple (N/A, 2018); hip surgery (Right, 2019); Total hip arthroplasty (Right, 2019); Medication Injection (Left, 3/25/2021); and Upper gastrointestinal endoscopy (N/A, 2021). Restrictions  Restrictions/Precautions  Restrictions/Precautions: Up as Tolerated  Required Braces or Orthoses?: No  Position Activity Restriction  Other position/activity restrictions: up with assist    Subjective   General  Patient assessed for rehabilitation services?: Yes  Family / Caregiver Present: No  Patient Currently in Pain: Yes  Pain Assessment  Pain Assessment: 0-10  Pain Level: 8  Pain Type: Chronic pain  Pain Location: Abdomen; Hip  Pain Orientation: Left  Non-Pharmaceutical Pain Intervention(s): Ambulation/Increased Activity  Response to Pain Intervention: Patient Satisfied  Vital Signs  Patient Currently in Pain: Yes     Social/Functional History  Social/Functional History  Lives With: Family  Type of Home: Apartment  Home Layout: Multi-level, One level  Home Access: Elevator  Bathroom Shower/Tub: Tub/Shower unit  Bathroom Toilet: Standard  Bathroom Equipment: Grab bars in shower  Home Equipment: Rolling walker, Wheelchair-manual, Cane(Uses cane)  ADL Assistance: Independent  Ambulation Assistance: Independent  Transfer Assistance: Independent  Active : No  Patient's  Info: Janee Rued   Mode of Transportation: Cab, Walk  Additional Comments: Pt reports she is homeless, staying at friends houses when she can     Objective   Vision: Impaired  Vision Exceptions: Wears glasses at all times  Hearing: Within functional limits    Orientation  Overall Orientation Status: Within Functional Limits     Balance  Sitting Balance: Independent  Standing Balance: Stand by assistance  Standing Balance  Time: ~8 minutes total  Activity: functional mobility, static standing  Comment: pt required 2x seated rest breaks while standing due to SOB and fatigue  Functional Mobility  Functional - Mobility Device: Standard Walker  Activity: Other  Assist Level: Stand by assistance  ADL  Feeding: Independent  Grooming: Independent  UE Bathing: Stand by assistance  LE Bathing: Stand by assistance  UE Dressing: Stand by assistance  LE Dressing: Stand by assistance(doffing/donning sock while seated EOB)  Toileting: Stand by assistance  Tone RUE  RUE Tone: Normotonic  Tone LUE  LUE Tone: Normotonic  Coordination  Movements Are Fluid And Coordinated: Yes     Bed mobility  Comment: Pt seated EOB upon arrival and exit  Transfers  Sit to stand: Stand by assistance  Stand to sit: Stand by assistance     Cognition  Overall Cognitive Status: WFL        Sensation  Overall Sensation Status: WFL      LUE AROM (degrees)  LUE AROM : WFL  Left Hand AROM (degrees)  Left Hand AROM: WFL  RUE AROM (degrees)  RUE AROM : WFL  Right Hand AROM (degrees)  Right Hand AROM: WFL  LUE Strength  Gross LUE Strength: Exceptions to [facility]  L Shoulder Flex: 4/5  L Elbow Flex: 4/5  L Hand General: 4/5  RUE Strength  Gross RUE Strength: Exceptions to [facility] [place]  R Shoulder Flex: 4/5  R Elbow Flex: 4/5  R Hand General: 4/5      Plan   Plan  Times per week: 3-4x    AM-PAC Score   AM-[facility] Inpatient Daily Activity Raw Score: 19 (04/23/21 1542)  AM-PAC Inpatient ADL T-Scale Score : 40.22 (04/23/21 1542)  ADL Inpatient CMS 0-100% Score: 42.8 (04/23/21 1542)  ADL Inpatient CMS G-Code Modifier : CK (04/23/21 1542)    Goals  Short term goals  Time Frame for Short term goals: By discharge pt will. Dez Grave term goal 1: demo functional transfers with mod I  Short term goal 2: demo functional mobility with mod I  Short term goal 3: tolerate 12+ minutes of standing balance to engage in functional tasks with mod I  Short term goal 4: demo UB/LB ADL tasks with mod I  Short term goal 5: tolerate 20+ minutes of activity to increase activity tolerance       Therapy Time   Individual Concurrent Group Co-treatment   Time In 1500         Time Out 1515         Minutes 15         Timed Code Treatment Minutes: 10 Minutes     Tevin Griffith, OTR/L

## 2021-04-23 NOTE — PLAN OF CARE
Problem: Falls - Risk of:  Goal: Will remain free from falls  Description: Will remain free from falls  4/23/2021 0605 by Tapan Pillai RN  Outcome: Met This Shift  4/22/2021 1858 by Herbert Weathers RN  Outcome: Ongoing  Goal: Absence of physical injury  Description: Absence of physical injury  4/23/2021 0605 by Tapan Pillai RN  Outcome: Met This Shift  4/22/2021 1858 by Herbert Weathers RN  Outcome: Ongoing     Problem: Pain:  Goal: Pain level will decrease  Description: Pain level will decrease  4/23/2021 0605 by Tapan Pillai RN  Outcome: Ongoing  4/22/2021 1858 by Herbert Weathers RN  Outcome: Ongoing  Goal: Control of acute pain  Description: Control of acute pain  4/23/2021 0605 by Tapan Pillai RN  Outcome: Ongoing  4/22/2021 1858 by Herbert Weathers RN  Outcome: Ongoing  Goal: Control of chronic pain  Description: Control of chronic pain  4/23/2021 0605 by Tapan Pillai RN  Outcome: Ongoing  4/22/2021 1858 by Herbert Weathers RN  Outcome: Ongoing     Problem: Health Behavior:  Goal: Ability to identify and utilize available support systems will improve  Description: Ability to identify and utilize available support systems will improve  Outcome: Ongoing  Goal: Compliance with therapeutic regimen will improve  Description: Compliance with therapeutic regimen will improve  Outcome: Ongoing  Goal: Ability to keep healthcare appointments will improve  Description: Ability to keep healthcare appointments will improve  Outcome: Ongoing

## 2021-04-23 NOTE — PROGRESS NOTES
Occupational Therapy Not Seen Note    DATE: 2021  Name: Juvencio Arboleda  : 1955  MRN: [id number]    Patient not available for Occupational Therapy due to:    Patient Declined: \"\"\" d/t abdominal and head pain and nausea.  OT will check back tomorrow    Next Scheduled Treatment: 2021    Electronically signed by NILDA Quiles on 2021 at 11:25 AM

## 2021-04-23 NOTE — PROGRESS NOTES
Physical Therapy    DATE: 2021    NAME: Meghann Powell  MRN: [id number]   : 1955      Patient not seen this date for Physical Therapy due to:    Patient Declined: Pt refused therapy at this time. c/o nausea  and increased anxiety. RN student present in room, will check back as time allows.       Electronically signed by Tony Spangler PTA on 2021 at 8:32 AM

## 2021-04-23 NOTE — FLOWSHEET NOTE
Patient refusing heparin gtt due to every 6 hrs PTT level blood draws. Discussed risks of non-compliance to medication. Patient continues to refuse. Ivelisse Crawford; NP notified.

## 2021-04-23 NOTE — CARE COORDINATION
Received social work consult. Met with pt this date. Pt states that she was living at [facility] prior to admission but she is not planning to return at discharge. Pt is agreeable to placement and referral has been made to [facility] [place].  Prior to living at [facility] pt did have an apartment at the [facility] but [place] were so bad that she moved out. Pt goes to the [facility] and is seen for depression and anxiety. Her  is [employer]. Pt asked writer to call Joo Og to update him on her plan to go to [facility] [place] at discharge. Writer called and left message for Joo Og informing him of plan for discharge to SNF. Pt states that she does have a hx of drug and alcohol use but has not used for months. Pt did not want to elaborate on her use and was not interested in resource list for treatment. Noted in chart that pt had a past open case with [facility]. Message left for [facility] to see if case is currently open, await return call.  awaiting pre-cert for SNF. Will follow.

## 2021-04-23 NOTE — CONSULTS
Palliative Care Inpatient Consult    NAME:  Susan Mitchell  MEDICAL RECORD NUMBER:  [id number]  AGE: 74 y.o. GENDER: female  : 1955  TODAY'S DATE:  2021    Reasons for Consultation:    Symptom and/or pain management  Provision of information regarding PC and/or hospice philosophies  Complex, time-intensive communication and interdisciplinary psychosocial support  Clarification of goals of care and/or assistance with difficult decision-making  Guidance in regards to resources and transition(s)    Members of PC team contributing to this consultation are :  Dr. Sera Pedraza palliative care attending  History of Present Illness     The patient is a 74 y.o. Non-/non  female who presents with Abdominal Pain, Chest Pain, and Headache      Referred to Palliative Care by   [x] Physician   [] Nursing  [] Family Request   [] Other:       She was admitted to the  Internal medicineservice for Right-sided chest wall pain [R07.89]  Acute kidney injury ([address] [N17.9]. Her hospital course has been associated with Acute kidney injury ([address] The patient has a complicated medical history and has been hospitalized since 2021 11:00 AM. The patient was admitted with the c/o chest pain. 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 Patient was instructed to follow-up with GI and surgery but has not been to these appointments yet and patient is homeless patient complained of difficulty with p.o. intake stating that she gets nausea when she eats, recently has had decreased frequency of bowel movements, weight loss. General surgery and oncology have been consulted. Palliative care consulted for goals of care, CODE STATUS discussion, family support and symptom management.     Active Hospital Problems    Diagnosis Date Noted    Noncompliance [Z91.19] 2021  Crack cocaine use [F14.90] 04/23/2021    Gastrointestinal stromal tumor (GIST) ([address] [C49. A0]     Hypoglycemia [E16.2]     History of pulmonary embolism [Z86.711]     Right-sided chest wall pain [R07.89] 04/21/2021    Acute kidney injury ([address] [N17.9] 04/21/2021    H/O malignant gastrointestinal stromal tumor (GIST) [Z85.09] 04/21/2021    Homeless [Z59.0] 04/04/2021    Pancreatic mass [K86.89] 04/04/2021    Multiple subsegmental pulmonary emboli without acute cor pulmonale (HCC) [I26.94]     Avascular necrosis of left femur ([address] [M87.052] 06/15/2020    Protein calorie malnutrition ([address] [E46] 09/22/2019    History of hepatitis C [Z86.19] 08/11/2019    COPD without exacerbation ([address] [J44.9] 08/11/2019    Hypokalemia [E87.6] 08/11/2019    Avascular necrosis of bone of right hip ([address] [M87.051] 08/10/2019    Polysubstance abuse ([address] [F19.10]     Pancreas head lesion [S36.200A] 01/10/2019    Psychosis, intermittent requring antipsychotic meds [F29] 48/18/4562    Alcoholic cirrhosis of liver without ascites ([address] [K70.30]     Chronic heart failure with reduced ejection fraction and diastolic dysfunction ([address] [I50.42] 10/23/2018    Essential hypertension [I10] 10/11/2018    History of substance abuse ([address] [F19.11] 10/11/2018       PAST MEDICAL HISTORY      Diagnosis Date    Appendicitis 3/27/2017    Cerebral artery occlusion with cerebral infarction ([address]     CHF (congestive heart failure) (HCC)     Chronic respiratory failure with hypoxia and hypercapnia (HCC)     Chronic rhinitis     Cigarette smoker     COPD (chronic obstructive pulmonary disease) ([address] 8/5/2018    Depression     Dysphagia     Essential hypertension 8/11/2018    GERD (gastroesophageal reflux disease)     Heart failure, diastolic, with acute decompensation ([facility] 9/5/2018    Hepatitis C, chronic (HCC)     History of smoking at least 1 pack per day for at least 30 years     Hyperlipidemia     Hypertension     Migraine     Moderate COPD (chronic obstructive pulmonary disease) (HCC)     Multiple transfusions     Neurogenic dysphagia     Osteoarthritis     hands    Pneumonia     Scoliosis     Substance abuse ([facility]     ivda and cocaine abuse in past       PAST SURGICAL HISTORY  Past Surgical History:   Procedure Laterality Date    APPENDECTOMY  2017     SECTION      GASTRIC BYPASS SURGERY      HIP SURGERY Right 2019    HIP TOTAL ARTHROPLASTY ANTERIOR APPROACH - [facility],  C-ARM, (Right )    LAPAROSCOPIC APPENDECTOMY N/A 3/27/2017    APPENDECTOMY LAPAROSCOPIC performed by Jared Mitchell MD at [facility]      hgsil    MEDICATION INJECTION Left 3/25/2021    STEROID INJECTION LEFT HIP  WITH FLUORO performed by Lauren Aponte MD at [address] [facility] N/A 11/10/2018    COLONOSCOPY WITH BIOPSY performed by Tyler Grullon MD at [facility]    CT EGD TRANSORAL BIOPSY SINGLE/MULTIPLE N/A 2018    EGD ESOPHAGOGASTRODUODENOSCOPY performed by Natalie Wang MD at [address] Right 2019    HIP TOTAL ARTHROPLASTY ANTERIOR APPROACH - [facility],  C-ARM, performed by Yumiko Machado DO at [address]      oesophagitis, candidiasis    UPPER GASTROINTESTINAL ENDOSCOPY  2018    UPPER GASTROINTESTINAL ENDOSCOPY N/A 2018    EGD CONTROL HEMORRHAGE performed by Natalie Wang MD at [address] N/A 2021    EGD W/EUS FNA performed by Juliana Vera MD at [facility]       SOCIAL HISTORY  Social History     Tobacco Use    Smoking status: Former Smoker     Packs/day: 1.00     Years: 40.00     Pack years: 40.00     Types: Cigarettes     Start date: 5     Quit date: 2018     Years since quittin.4    Smokeless tobacco: Never Used   Substance Use Topics    Alcohol use: No     Alcohol/week: 0.0 standard drinks    Drug use:  No Types: IV, Cocaine     Comment: past history       ALLERGIES  Allergies   Allergen Reactions    Aspirin Itching    Keflet [Cephalexin]      Tolerated penicillin V 12/13, tolerates cephalexin 6/14         MEDICATIONS  Current Medications    lidocaine 1 % injection  5 mL Intradermal Once    sodium chloride flush  5-40 mL Intravenous 2 times per day    amitriptyline  10 mg Oral Nightly    atorvastatin  40 mg Oral Nightly    carvedilol  6.25 mg Oral BID WC    clopidogrel  75 mg Oral Daily    famotidine  20 mg Oral BID    ferrous sulfate  325 mg Oral Daily    folic acid  1 mg Oral Daily    gabapentin  300 mg Oral TID    mirtazapine  30 mg Oral Nightly    budesonide-formoterol  2 puff Inhalation BID    therapeutic multivitamin-minerals  1 tablet Oral Daily    sodium chloride flush  5-40 mL Intravenous 2 times per day     sodium chloride flush, sodium chloride, HYDROcodone 5 mg - acetaminophen, ipratropium-albuterol, sodium chloride flush, sodium chloride, potassium chloride **OR** potassium alternative oral replacement **OR** potassium chloride, magnesium sulfate, promethazine **OR** ondansetron, polyethylene glycol, acetaminophen **OR** acetaminophen, hydrOXYzine, glucose, dextrose, glucagon (rDNA), dextrose, heparin (porcine), heparin (porcine)  IV Drips/Infusions   sodium chloride      sodium chloride      dextrose      sodium chloride 100 mL/hr at 04/22/21 2103    heparin (PORCINE) Infusion Stopped (04/22/21 0050)     Home Medications  No current facility-administered medications on file prior to encounter.       Current Outpatient Medications on File Prior to Encounter   Medication Sig Dispense Refill    famotidine (PEPCID) 20 MG tablet Take 1 tablet by mouth 2 times daily 60 tablet 3    mometasone-formoterol (DULERA) 100-5 MCG/ACT inhaler Inhale 2 puffs into the lungs 2 times daily 1 Inhaler 1    mirtazapine (REMERON) 30 MG tablet Take 1 tablet by mouth nightly 30 tablet 3    lisinopril (PRINIVIL;ZESTRIL) 10 MG tablet Take 1 tablet by mouth daily 30 tablet 0    gabapentin (NEURONTIN) 300 MG capsule Take 1 capsule by mouth 3 times daily for 30 days. 90 capsule 0    ferrous sulfate (MELISSA-HAO) 325 (65 Fe) MG tablet Take 1 tablet by mouth daily 30 tablet 0    clopidogrel (PLAVIX) 75 MG tablet Take 1 tablet by mouth daily 30 tablet 3    carvedilol (COREG) 6.25 MG tablet Take 1 tablet by mouth 2 times daily (with meals) 60 tablet 3    atorvastatin (LIPITOR) 40 MG tablet Take 1 tablet by mouth nightly 30 tablet 3    apixaban (ELIQUIS) 5 MG TABS tablet Take 1 tablet by mouth 2 times daily 60 tablet 3    amitriptyline (ELAVIL) 10 MG tablet Take 1 tablet by mouth nightly 30 tablet 3    albuterol sulfate HFA (PROVENTIL HFA) 108 (90 Base) MCG/ACT inhaler Inhale 1-2 puffs into the lungs once for 1 dose 1 Inhaler 0    butalbital-acetaminophen-caffeine (FIORICET, ESGIC) -40 MG per tablet Take 1 tablet by mouth every 12 hours for 5 doses 5 tablet 0    Elastic Bandages & Supports (LUMBAR BACK BRACE/SUPPORT PAD) MISC 1 Units by Does not apply route daily 1 each 0    [DISCONTINUED] amLODIPine (NORVASC) 10 MG tablet Take 1 tablet by mouth daily 30 tablet 0    Sennosides (SENNA) 8.6 MG CAPS 8.6 capsules      Misc.  Devices [facility]) MISC 1 Device by Does not apply route once for 1 dose 1 each 0    ipratropium-albuterol (DUONEB) 0.5-2.5 (3) MG/3ML SOLN nebulizer solution Inhale 3 mLs into the lungs every 4 hours as needed for Shortness of Breath 738 mL 5    folic acid (FOLVITE) 1 MG tablet Take 1 tablet by mouth daily 30 tablet 3    vitamin B-1 100 MG tablet Take 1 tablet by mouth daily 30 tablet 3    vitamin B-12 250 MCG tablet Take 1 tablet by mouth daily 30 tablet 3    Multiple Vitamins-Minerals (THERAPEUTIC MULTIVITAMIN-MINERALS) tablet Take 1 tablet by mouth daily 30 tablet 3       Data         BP (!) 153/88   Pulse 79   Temp 99.1 °F (37.3 °C) (Oral)   Resp 18   Ht 4' 11\" (1.499 m) Wt 94 lb 12.8 oz (43 kg)   LMP 2015   SpO2 97%   BMI 19.15 kg/m²     Wt Readings from Last 3 Encounters:   21 94 lb 12.8 oz (43 kg)   21 94 lb 8 oz (42.9 kg)   21 85 lb (38.6 kg)        Code Status: Full Code     ADVANCED CARE PLANNING:  Patient has capacity for medical decisions: yes  Health Care Power of : no  Living Will: no     Personal, Social, and Family History  Marital Status: single  Living situation: homeless  Importance of malcolm/Mandaeism/spiritual beliefs: [] Very [x] Somewhat [] Not   Psychological Distress: mild  Does patient understand diagnosis/treatment?  yes  Does caregiver understand diagnosis/treatment? not asked    Past Medical History:   Diagnosis Date    Appendicitis 3/27/2017    [facility]erebral [facility] occlusion [facility] infarction [facility]     CHF (congestive heart failure) (HCC)     Chronic respiratory failure with hypoxia and hypercapnia (HCC)     Chronic rhinitis     Cigarette smoker     COPD (chronic obstructive pulmonary disease) ([facility] 2018    Depression     Dysphagia     Essential hypertension 2018    GERD (gastroesophageal reflux disease)     Heart failure, diastolic, with acute decompensation ([facility] 2018    Hepatitis C, chronic (HCC)     History of smoking at least 1 pack per day for at least 30 years     Hyperlipidemia     Hypertension     Migraine     Moderate COPD (chronic obstructive pulmonary disease) (HCC)     Multiple transfusions     Neurogenic dysphagia     Osteoarthritis     hands    Pneumonia     Scoliosis     Substance abuse ([facility]     ivda and cocaine abuse in past         Family History   Problem Relation Age of Onset    Breast Cancer Mother     Heart Disease Father        Social History     Tobacco Use    Smoking status: Former Smoker     Packs/day: 1.00     Years: 40.00     Pack years: 40.00     Types: Cigarettes     Start date: 5     Quit date: 2018     Years since quittin.4    Smokeless tobacco: Never Used   Substance Use Topics    Alcohol use: No     Alcohol/week: 0.0 standard drinks    Drug use: No     Types: IV, Cocaine     Comment: past history           Assessment        REVIEW OF SYSTEMS  Constitutional: no fever, no chills, + weight loss,   Eyes: no eye pain or blurred vision  ENT: no hearing loss, congestion, or difficulty swallowing   Respiratory: no wheezing, chest tightness, or shortness of breath   Cardiovascular: no chest pain or pressure, no palpitations, no diaphoresis   Gastrointestinal: + nausea, vomiting, + abdominal pain, diarrhea or constipation, no melena   Genitourinary: no dysuria, frequency, hematuria, or nocturia   Musculoskeletal: no myalgias or arthralgias, no back pain   Skin: no rashes or sores   Neurological: no focal weakness, numbness, tingling or headache, no seizures    PHYSICAL ASSESSMENT:  Constitutional: Alert and oriented to person, place, and time, frail  Head: Normocephalic and atraumatic. Eyes: EOM are normal. Pupils are equal, round. Neck: Normal range of motion. Neck supple. No tracheal deviation present. Cardiovascular: Normal rate and regular rhythm, S1, S2, no murmur. Pulmonary/Chest: Effort normal and breath sounds normal. No rales or wheezes. Abdomen: Soft,+ tenderness, not distended, no ascites, no organomegaly. Musculoskeletal: Normal range of motion. No edema lower ext. Neurological: Alert, awake, oriented, following commands  Skin: Normal turgor, no bleeding, no bruising. Palliative Performance Scale:  __x_60%  Ambulation reduced; Significant disease; Can't do hobbies/housework; intake normal or reduced; occasional assist; LOC full/confusion  ___50%  Mainly sit/lie; Extensive disease; Can't do any work; Considerable assist; intake normal or reduced; LOC full/confusion  ___40%  Mainly in bed; Extensive disease; Mainly assist; intake normal or reduced; LOC full/confusion   ___30%  Bed Bound; Extensive disease;  Total care; intake reduced; LOC full/confusion  ___20%  Bed Bound; Extensive disease; Total care; intake minimal; Drowsy/coma  ___10%  Bed Bound; Extensive disease;  Total care; Mouth care only; Drowsy/coma  ___0       Death      Plan      Palliative Interaction:  The patient was seen today  Patient was lying comfortably alert, awake, oriented and following commands and in no acute distress  No family member was present in patient's room  I introduced myself to the patient and told her that palliative care is an extra layer of support and strength for her and family    Patient told that she is homeless and had gone to live with her brother but he told her that he cannot have her in his house and thus she has nowhere to go  Patient told that she is single and has only 1 living son named Margarita Rodríguez  I explained the significance of POA paperwork for health to the patient and patient does not have POA paperwork for health   I explained to the patient that since she has only 1 living son Margarita Rodríguez hence if anytime she is not able to make any decisions then he is her legal spokesperson and patient understands about it    I discussed patient's current medical conditions with her and patient stated that she knows that she has mass/tumor in her stomach  I asked the patient what her goals were and patient stated that she wants to get surgery for her tumor  I told the patient that there is a problem regarding her compliance with following up with her physicians  Patient told that  \" she is willing to be more compliant and does not want surgery so that she does not have to come here again and again and also wants her symptoms to be taken care off \"  I explained the different types of codes to the patient  I told the patient that I will be talking to her cancer doctor and discussing further management  I offered comfort and emotional support to the patient      I met with Dr. Cathryn Zimmerman patient's oncologist and discussed patient's current medical conditions with her  I informed Dr. Sherry Huynh that I had seen the patient and she is willing to be compliant and wants surgery to be done  Dr. Sherry Huynh discussed with me that he will be contacting surgery and GI team to discuss further management for the patient      Education/support to staff  Education/support to family  Education/support to patient  Discharge planning/helping to coordinate care  Communications with primary service  Caregiver support/education  Code status clarified: Full Code  Code status clarified: [facility]  Code status clarified: [facility]  Other major issues     Principle Problem/Diagnosis:  Acute kidney injury ([address]    Additional Assessments:   Principal Problem:    Acute kidney injury ([address]  Active Problems:    Essential hypertension    History of substance abuse ([address]    Chronic heart failure with reduced ejection fraction and diastolic dysfunction (HCC)    Alcoholic cirrhosis of liver without ascites ([address]    Psychosis, intermittent requring antipsychotic meds    Pancreas head lesion    Polysubstance abuse ([address]    Avascular necrosis of bone of right hip ([address]    COPD without exacerbation ([address]    Hypokalemia    History of hepatitis C    Protein calorie malnutrition ([address]    Avascular necrosis of left femur ([address]    Multiple subsegmental pulmonary emboli without acute cor pulmonale (HCC)    Pancreatic mass    Homeless    Right-sided chest wall pain    H/O malignant gastrointestinal stromal tumor (GIST)    Gastrointestinal stromal tumor (GIST) ([address]    Hypoglycemia    History of pulmonary embolism    Noncompliance    Crack cocaine use    1- Symptom management/ pain control     Pain Assessment:  Pain is controlled with current analgesics.   Medication(s) being used: acetaminophen, narcotic analgesics including narco.               Anxiety:  fatigue , shortness of breath                                                  Dyspnea:  acute dyspnea and chronic dyspnea                          Fatigue:  Tiredness and weakness We feel the patient symptoms are being controlled. her current regimen is reviewed by myself and discussed with the staff. We will follow-up with the patient to discuss further goals of care and CODE STATUS discussion  We will continue to provide comfort and support to the patient and family  ACP note has been completed by me    2- Goals of care evaluation   The patient goals of care are improve or maintain function/quality of life, accomplish a particular personal goal, spiritual needs, strengthening relationships, preserve independence/autonomy/control and support for family/caregiver   Goals of care discussed with:    [x] Patient independently    [] Patient and Family    [] Family or Healthcare DPOA independently    [] Unable to discuss with patient, family/DPOA not present    3- Code Status  Full Code    4- Other recommendations   - We will continue to provide comfort and support to the patient and the family  Please call with any palliative questions or concerns. Palliative Care Team is available via perfect serve or via phone. Palliative Care will continue to follow Ms. Daniels's care as needed. Thank you for allowing Palliative Care to participate in the care of Ms. Daniels . This note has been dictated by dragon, typing errors may be a possibility.     The total time I spent in seeing the patient, discussing goals of care, advanced directives, code status, greater than 50% time in counseling and other major issues was more than 60 minutes      Electronically signed by   Sean Goldsmith MD  Palliative Care Team  on 4/23/2021 at 2:22 PM    Palliative care office: [phone]

## 2021-04-23 NOTE — PROGRESS NOTES
11\" (149.9 cm)  · Current Body Weight: 94 lb 12.8 oz (43 kg)   · Admission Body Weight: 94 lb 8 oz (42.9 kg)    · Ideal Body Weight: 95 lbs; % Ideal Body Weight 99.8 %   · BMI: 19.1  · Adjusted Body Weight:  No Adjustment   · BMI Categories: Normal Weight (BMI 18.5-24. 9)       Nutrition Diagnosis:   · Inadequate oral intake related to altered GI function and poor appetite as evidenced by nausea, poor intake prior to admission, weight loss    · Moderate malnutrition, In context of chronic illness related to inadequate protein-energy intake as evidenced by poor intake prior to admission, mild loss of subcutaneous fat, mild muscle loss    Nutrition Interventions:   Food and/or Nutrient Delivery:  Continue Current Diet, Start Oral Nutrition Supplement  Nutrition Education/Counseling:  Education not indicated, No recommendation at this time   Coordination of Nutrition Care:  Continue to monitor while inpatient    Goals:  PO intake to meet >75% of estimated nutrition needs       Nutrition Monitoring and Evaluation:   Behavioral-Environmental Outcomes:  None Identified   Food/Nutrient Intake Outcomes:  Food and Nutrient Intake, Supplement Intake  Physical Signs/Symptoms Outcomes:  Biochemical Data, Nausea or Vomiting, Nutrition Focused Physical Findings, Weight, Skin     Discharge Planning:     Too soon to determine     Electronically signed by Helene Goltz, RD, LD on 4/23/21 at 12:03 PM EDT    Contact: [phone]

## 2021-04-23 NOTE — PLAN OF CARE
Nutrition Problem #1: Inadequate oral intake  Intervention: Food and/or Nutrient Delivery: Continue Current Diet, Start Oral Nutrition Supplement  Nutritional Goals: PO intake to meet >75% of estimated nutrition needs

## 2021-04-23 NOTE — PROGRESS NOTES
[facility]    Office: [address], DO, Suzette Johns, DO, Montez Lopez, DO, Marlo Kanner Cristal, DO, Amada Santoro MD, Lorene Comer MD, Srinivas Culp MD, Tammy Poole MD, Rosa Mathis MD, Domitila Conley MD, Gina Caban MD, Ammon Marquez MD, Jenelle Kelley MD, Hanh Hinkle DO, Hafsa Chandra MD, Shantanu Martinez MD, Mariya Tyson, DO, Rufus Narvaez MD,  Michelle Claudio DO, Kera Alcaraz MD, Jim Shepard MD, Roberto Hernandez [facility], [address], [facility], Maury Mcguire, CNP, Delmi Bowers, CNS, Davis Valenzuela, CNP, Montez Rubi, CNP, Opal Gutiérrez, CNP, Corbin Gusman, CNP, Gildardo Shirley, CNP, Eric Laureano PA-C, Rocky Riley, [facility], Thai Padilla, CNP, Kyle Marshall, CNP, Rosi Ruggiero, CNP, Price Davis, CNP, Krissy Padilla, [facility]   [address]    Progress Note    4/23/2021    2:31 PM    Name:   Debbie Jones  MRN:     [id number]     Acct:      [de-identified]   Room:   [address] [place] [address] Day:  2  Admit Date:  4/21/2021 11:00 AM    PCP:   No primary care provider on file. Code Status:  Full Code    Subjective:     C/C:   Chief Complaint   Patient presents with    Abdominal Pain    Chest Pain    Headache       Interval History Status:  Improved  Requesting Fioricet for headache  Abdominal pain diminished  Appetite still decreased    Data Base Updates:  WBC4.4k/uL RBC3.08Low m/uL Hemoglobin9.9Low     Jwrspoc01ye/dL     GPQ32ej/dL CREATININE1. 12High mg/dL   Bun/Cre RatioNOT REPORTED     Calcium7.8Low mg/dL   Albumin3. 2Low g/dL   Phosphorus2. 4Low   Magnesium1.2Low     Follow-up EKG:  Normal sinus rhythm   Minimal voltage criteria for LVH, may be normal variant   ST & T wave abnormality, consider inferior ischemia   ST & T wave abnormality, consider anterolateral ischemia   Abnormal ECG   When compared with ECG of 21-APR-2021 11:03,   T wave inversion now evident in Inferior leads   Inverted T waves have replaced nonspecific T wave abnormality in Anterior leads   QT has shortened     CEA3.0   CA 19-9<1   AFP (Alpha Fetoprotein)3.0     Brief History:     As documented in the medical record:  \"Emi Daniels is a 74 y.o. female with past medical history for COPD, diastolic heart failure, depression, chronic hepatitis, homelessness presents to the emergency department for right-sided chest pain and anxiety. Patient was previously seen earlier this month for a pancreatic mass which was biopsied. 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 She is recommended follow-up with general surgery which was supposed to be today. She states she has been so anxious about her diagnosis that she start developing chest pain and shortness of breath. She is unable to go to her appointment today. She states she has difficulty with follow-up due to scheduling issue, does not recall having a conversation with gastroenterology even though this conversation had been charted. During my examination she has reproducible chest wall pain, shortness of breath is improved. She had episode of hypoglycemia while in the emergency department and was given D50, she was also noted to have an CRYSTAL and was admitted for this. General surgery was consulted for recommendations regarding chest, patient is homeless, may not follow-up outpatient. The initial assessment and plan included:  Acute kidney Injury -creatinine 1.46 with a baseline 0.54,  start IV fluids, hold lisinopril. CT abdomen pelvis showing no obstruction. Could be from hypotension versus ACE inhibitor    Right-sided chest pain -chronic issue, did not think ACS. Worsens with palpation and deep inspiration, pain management    Homelessness -patient readmitted after recent discharge, unable to follow-up with appointments.   Consult social work for recommendations as patient seems unable to take care of herself    GIST -gastroenterology seems to have contacted her regarding results with patient have no recollection. Patient is homeless, unable to follow-up outpatient and seems to have poor memory. Will consult general surgery to see if resection could possibly be performed inpatient    She had EUS with Bx  Surgical Pathology Report 04/05/2021  1:17 PM [facility]   -- Diagnosis --   PANCREATIC LESION, EUS GUIDED FNB:          GASTROINTESTINAL STROMAL TUMOR (GIST). Hypoglycemia -given amp of D50, continue to monitor. Severe protein calorie malnutritionlikely related to her homelessness  COPD  CVA  Tobacco abuse  Essential hypertension  Chronic hepatitis C\"     Troponin, High [id number]     Results for Elester Blend (MRN [id number]) as of 4/22/2021 14:15   Ref. Range 10/20/2020 17:50 4/3/2021 19:33 4/4/2021 19:06 4/6/2021 08:37 4/21/2021 11:21   Creatinine Latest Ref Range: 0.50 - 0.90 mg/dL 0.92 (H) 0.56 0.64 0.54 1.46 (H)     MRI 1/2020  Chronic microvascular disease with a focus of restricted diffusion in the   left posterior frontal lobe consistent with acute/subacute ischemia.       Mild chronic sinusitis in the maxillary sinuses. CT abdomen 4/21/2021  No acute abdominal or pelvic abnormality.       Redemonstration of a peripherally enhancing mass in the pancreatic body that   is not significantly changed compared to prior examination. Medications: Allergies:     Allergies   Allergen Reactions    Aspirin Itching    Keflet [Cephalexin]      Tolerated penicillin V 12/13, tolerates cephalexin 6/14       Current Meds:   Scheduled Meds:    lidocaine 1 % injection  5 mL Intradermal Once    sodium chloride flush  5-40 mL Intravenous 2 times per day    amitriptyline  10 mg Oral Nightly    atorvastatin  40 mg Oral Nightly    carvedilol  6.25 mg Oral BID WC    clopidogrel  75 mg Oral Daily    famotidine  20 mg Oral BID    ferrous sulfate  325 mg Oral Daily    folic acid  1 mg Oral Daily    gabapentin  300 mg Oral TID    mirtazapine  30 mg Oral Nightly    budesonide-formoterol  2 puff Inhalation BID    therapeutic multivitamin-minerals  1 tablet Oral Daily    sodium chloride flush  5-40 mL Intravenous 2 times per day     Continuous Infusions:    sodium chloride      sodium chloride      dextrose      sodium chloride 100 mL/hr at 04/22/21 2103    heparin (PORCINE) Infusion Stopped (04/22/21 0050)     PRN Meds: sodium chloride flush, sodium chloride, HYDROcodone 5 mg - acetaminophen, ipratropium-albuterol, sodium chloride flush, sodium chloride, potassium chloride **OR** potassium alternative oral replacement **OR** potassium chloride, magnesium sulfate, promethazine **OR** ondansetron, polyethylene glycol, acetaminophen **OR** acetaminophen, hydrOXYzine, glucose, dextrose, glucagon (rDNA), dextrose, heparin (porcine), heparin (porcine)    Data:     Past Medical History:   has a past medical history of Appendicitis, Cerebral artery occlusion with cerebral infarction ([address] CHF (congestive heart failure) ([address] Chronic respiratory failure with hypoxia and hypercapnia ([address] Chronic rhinitis, Cigarette smoker, COPD (chronic obstructive pulmonary disease) ([address] Depression, Dysphagia, Essential hypertension, GERD (gastroesophageal reflux disease), Heart failure, diastolic, with acute decompensation ([address] Hepatitis C, chronic ([address] History of smoking at least 1 pack per day for at least 30 years, Hyperlipidemia, Hypertension, Migraine, Moderate COPD (chronic obstructive pulmonary disease) ([address] Multiple transfusions, Neurogenic dysphagia, Osteoarthritis, Pneumonia, Scoliosis, and Substance abuse ([address] Social History:   reports that she quit smoking about 2 years ago. Her smoking use included cigarettes. She started smoking about 51 years ago. She has a 40.00 pack-year smoking history.  She has never used smokeless tobacco. She reports that she does not drink alcohol or use drugs. Family History:   Family History   Problem Relation Age of Onset    Breast Cancer Mother     Heart Disease Father        Review of Systems:     Review of Systems   Constitutional: Positive for activity change (Diminished) and appetite change (Decreased). Respiratory: Negative for cough and shortness of breath. Cardiovascular: Positive for chest pain (Right rib pain). Negative for palpitations. Gastrointestinal: Positive for abdominal pain (having mild epigastric pain ). Negative for nausea and vomiting. Genitourinary: Negative for flank pain and hematuria. Musculoskeletal: Positive for arthralgias and myalgias. The patient has chronic arthralgias and myalgias        Physical Examination:        Physical Exam  Vitals signs reviewed. Constitutional:       General: She is not in acute distress. Appearance: She is not diaphoretic. HENT:      Head: Normocephalic. Nose: Nose normal.      Mouth/Throat:      Comments: Dentition poor  Eyes:      General: No scleral icterus. Conjunctiva/sclera: Conjunctivae normal.   Neck:      Musculoskeletal: Neck supple. Trachea: No tracheal deviation. Cardiovascular:      Rate and Rhythm: Normal rate and regular rhythm. Pulmonary:      Effort: Pulmonary effort is normal. No respiratory distress. Breath sounds: Normal breath sounds. No wheezing or rales. Chest:      Chest wall: No tenderness. Abdominal:      General: Bowel sounds are normal. There is no distension. Palpations: Abdomen is soft. Tenderness: There is no abdominal tenderness. There is no guarding or rebound. Musculoskeletal:         General: No tenderness. Comments: Muscle mass decreased   Skin:     General: Skin is warm and dry. Neurological:      Mental Status: She is alert.        Vitals:  BP (!) 153/88   Pulse 79   Temp 99.1 °F (37.3 °C) (Oral)   Resp 18   Ht 4' 11\" (1.499 m)   Wt 94 lb 12.8 oz (43 kg)   LMP 2015   SpO2 97%   BMI 19.15 kg/m²   Temp (24hrs), Av.6 °F (37 °C), Min:97.7 °F (36.5 °C), Max:99.1 °F (37.3 °C)    Recent Labs     21  1159 21  0723 21  1238   POCGLU 113* 127* 132* 102       I/O (24Hr): Intake/Output Summary (Last 24 hours) at 2021 1431  Last data filed at 2021 1000  Gross per 24 hour   Intake 2134 ml   Output 800 ml   Net 1334 ml       Labs:  Hematology:  Recent Labs     21  1121 21  0607   WBC 5.1 4.4   RBC 4.24 3.08*   HGB 13.7 9.9*   HCT 42.2 30.9*   MCV 99.5 100.3   MCH 32.3 32.1   MCHC 32.5 32.0   RDW 13.3 13.9   * 563*   MPV 9.0 9.1     Chemistry:  Recent Labs     21  1121 21  1453 21  0607     --  142   K 3.6*  --  4.0     --  115*   CO2 16*  --  19*   GLUCOSE 46*  --  96   BUN 23  --  15   CREATININE 1.46*  --  1.12*   MG  --   --  1.2*   ANIONGAP 20*  --  8*   LABGLOM 36*  --  48*   GFRAA 43*  --  59*   CALCIUM 9.3  --  7.8*   PHOS  --   --  2.4*   TROPHS  --  19*  --    LACTACIDWB 1.8  --   --      Recent Labs     21  1121 21  1121 21  1230 21  1325 21  1159 21  0607 21  0723 21  1238   PROT 8.4*  --   --   --   --   --   --   --   --    LABALBU 4.2  --   --   --   --   --  3.2*  --   --    AST 39*  --   --   --   --   --   --   --   --    ALT 20  --   --   --   --   --   --   --   --    ALKPHOS 83  --   --   --   --   --   --   --   --    BILITOT 0.46  --   --   --   --   --   --   --   --    LIPASE 54  --   --   --   --   --   --   --   --    POCGLU  --    < > 214* 171* 113* 127*  --  132* 102    < > = values in this interval not displayed.      ABG:  Lab Results   Component Value Date    POCPH 7.366 2019    POCPCO2 51.0 2019    POCPO2 65.9 2019    POCHCO3 29.2 2019    NBEA NOT REPORTED 2019    PBEA 3 2019    XIB4AUA 31 2019 DOPE9MBL 92 01/08/2019    FIO2 NOT REPORTED 09/04/2019     Lab Results   Component Value Date/Time    SPECIAL NOT REPORTED 04/04/2021 12:09 AM     Lab Results   Component Value Date/Time    CULTURE NO SIGNIFICANT GROWTH 04/04/2021 12:09 AM       Radiology:  Ct Abdomen Pelvis W Iv Contrast Additional Contrast? None    Result Date: 4/21/2021  No acute abdominal or pelvic abnormality. Redemonstration of a peripherally enhancing mass in the pancreatic body that is not significantly changed compared to prior examination. Assessment:        Principal Problem:    CRYSTAL (acute kidney injury) ([facility]  Active Problems:    Essential hypertension    History of substance abuse ([facility]    Chronic heart failure with reduced ejection fraction and diastolic dysfunction (HCC)    Alcoholic cirrhosis of liver without ascites (HCC)    Psychosis, intermittent requring antipsychotic meds    Pancreas head lesion    Polysubstance abuse ([facility]    Avascular necrosis of bone of right hip (HCC)    COPD without exacerbation (HCC)    Hypokalemia    History of hepatitis C    Protein calorie malnutrition (HCC)    Avascular necrosis of left femur (HCC)    Multiple subsegmental pulmonary emboli without acute cor pulmonale (HCC)    Pancreatic mass    Homeless    Right-sided chest wall pain    H/O malignant gastrointestinal stromal tumor (GIST)    Gastrointestinal stromal tumor (GIST) (HCC)    Hypoglycemia    History of pulmonary embolism    Noncompliance    Crack cocaine use  Resolved Problems:    * No resolved hospital problems.  *      Plan:        Check bun and creatinine  Lisinopril on hold  Pain control  Oncology evaluation - GIST, pancreatic mass, thrombocytosis in progress  Social service consult, the patient has been homeless  Blood Pressure - Monitor and control   Nutritional supplementation  Correct electrolyte abnormalities  Trend troponins, doubt non-ST elevation MI  Glycemic contol - monitor and control blood sugars   Hx of PE last July, Heparin initiated   Eliquis on hold  Status discussed with the patient's son Marquita Morales and Adina Jade  They report that the patient continues to use crack cocaine  The patient had hoped to return home with her brother but he will refused to take her because he \"does not want her to die in his house \"  They believe she is not in her \"right mind\" and would like a psych consult  (Has a hx of psychosis)   The patient is homeless  She was thrown out of the [facility] for drug use  She no longer has a PCP  Family reports chronic noncompliance  They are requesting placement  Informed family that if the patient agrees, that referral to a tertiary center for possible surgery on her GIST tumor is suggested (the patient if refusing)  Palliative care consulted    IP CONSULT TO HOSPITALIST  IP CONSULT TO SOCIAL WORK  IP CONSULT TO GENERAL SURGERY  IP CONSULT TO IV TEAM  IP CONSULT  Leana  IP CONSULT TO PSYCHIATRY  IP CONSULT TO [address] [place] [address]  4/23/2021  2:31 PM

## 2021-04-23 NOTE — CARE COORDINATION
Received call from pt's son, Zofia King, and sister. They stated pt is not able to return to brother, Gilbert's house. Met with pt to discuss. She is agreeable to SNF and interested in [facility]. Referral sent    1300 notified [place] at [facility] of referral. She will review    [phone] received call from [place]. Pt refused PT/OT today. She needs notes to start precert. Met with pt. She is agreeable to work with PT/OT. Notified Santana Foster and Matteo YATES update provided to [place] that OT worked with pt.  She will submit precert and submit updated PT/OT notes when available

## 2021-04-23 NOTE — ACP (ADVANCE CARE PLANNING)
Advance Care Planning     Advance Care Planning (ACP) Physician/NP/PA Conversation    Date of Conversation: 4/21/2021  Conducted with: Patient with Decision Making Capacity    Healthcare Decision Maker:    Patient's legal spokesperson is patient's only jose Baca ([phone]) as per [place] state laws    Care Preferences:    Hospitalization: \"If your health worsens and it becomes clear that your chance of recovery is unlikely, what would be your preference regarding hospitalization? \"  Currently hospitalized    Ventilation: \"If you were unable to breath on your own and your chance of recovery was unlikely, what would be your preference about the use of a ventilator (breathing machine) if it was available to you? \"  Full code as per patient's wishes    Resuscitation: \"In the event your heart stopped as a result of an underlying serious health condition, would you want attempts made to restart your heart, or would you prefer a natural death? \"  Full code as per patient's wishes    Conversation Outcomes / Follow-Up Plan:  Patient is single, has only 1 living jose Baca ([phone])  Patient has no POA paperwork for health  Patient's legal spokesperson is patient's only jose Baca as per Abbi Glez MD

## 2021-04-24 PROBLEM — F33.9 EPISODE OF RECURRENT MAJOR DEPRESSIVE DISORDER (HCC): Status: ACTIVE | Noted: 2021-04-24

## 2021-04-24 LAB
EKG ATRIAL RATE: 81 BPM
EKG P AXIS: 75 DEGREES
EKG P-R INTERVAL: 176 MS
EKG Q-T INTERVAL: 344 MS
EKG QRS DURATION: 82 MS
EKG QTC CALCULATION (BAZETT): 399 MS
EKG R AXIS: 56 DEGREES
EKG T AXIS: -32 DEGREES
EKG VENTRICULAR RATE: 81 BPM
GLUCOSE BLD-MCNC: 118 MG/DL (ref 65–105)
GLUCOSE BLD-MCNC: 122 MG/DL (ref 65–105)
GLUCOSE BLD-MCNC: 128 MG/DL (ref 65–105)
GLUCOSE BLD-MCNC: 220 MG/DL (ref 65–105)

## 2021-04-24 PROCEDURE — 2580000003 HC RX 258: Performed by: INTERNAL MEDICINE

## 2021-04-24 PROCEDURE — 6370000000 HC RX 637 (ALT 250 FOR IP): Performed by: INTERNAL MEDICINE

## 2021-04-24 PROCEDURE — 97530 THERAPEUTIC ACTIVITIES: CPT

## 2021-04-24 PROCEDURE — 82947 ASSAY GLUCOSE BLOOD QUANT: CPT

## 2021-04-24 PROCEDURE — 94640 AIRWAY INHALATION TREATMENT: CPT

## 2021-04-24 PROCEDURE — 93010 ELECTROCARDIOGRAM REPORT: CPT | Performed by: INTERNAL MEDICINE

## 2021-04-24 PROCEDURE — 99232 SBSQ HOSP IP/OBS MODERATE 35: CPT | Performed by: INTERNAL MEDICINE

## 2021-04-24 PROCEDURE — 6370000000 HC RX 637 (ALT 250 FOR IP): Performed by: NURSE PRACTITIONER

## 2021-04-24 PROCEDURE — 2580000003 HC RX 258: Performed by: NURSE PRACTITIONER

## 2021-04-24 PROCEDURE — 1200000000 HC SEMI PRIVATE

## 2021-04-24 PROCEDURE — 90792 PSYCH DIAG EVAL W/MED SRVCS: CPT | Performed by: NURSE PRACTITIONER

## 2021-04-24 RX ORDER — BUTALBITAL, ACETAMINOPHEN AND CAFFEINE 50; 325; 40 MG/1; MG/1; MG/1
1 TABLET ORAL EVERY 6 HOURS PRN
Status: DISCONTINUED | OUTPATIENT
Start: 2021-04-24 | End: 2021-04-27 | Stop reason: HOSPADM

## 2021-04-24 RX ADMIN — HYDROCODONE BITARTRATE AND ACETAMINOPHEN 1 TABLET: 5; 325 TABLET ORAL at 17:43

## 2021-04-24 RX ADMIN — SODIUM CHLORIDE, PRESERVATIVE FREE 5 ML: 5 INJECTION INTRAVENOUS at 09:51

## 2021-04-24 RX ADMIN — HYDROCODONE BITARTRATE AND ACETAMINOPHEN 1 TABLET: 5; 325 TABLET ORAL at 03:38

## 2021-04-24 RX ADMIN — BUDESONIDE AND FORMOTEROL FUMARATE DIHYDRATE 2 PUFF: 160; 4.5 AEROSOL RESPIRATORY (INHALATION) at 19:41

## 2021-04-24 RX ADMIN — AMITRIPTYLINE HYDROCHLORIDE 10 MG: 10 TABLET, FILM COATED ORAL at 21:04

## 2021-04-24 RX ADMIN — GABAPENTIN 300 MG: 300 CAPSULE ORAL at 21:04

## 2021-04-24 RX ADMIN — SODIUM CHLORIDE, PRESERVATIVE FREE 5 ML: 5 INJECTION INTRAVENOUS at 09:52

## 2021-04-24 RX ADMIN — PROMETHAZINE HYDROCHLORIDE 12.5 MG: 12.5 TABLET ORAL at 21:55

## 2021-04-24 RX ADMIN — ATORVASTATIN CALCIUM 40 MG: 80 TABLET, FILM COATED ORAL at 21:04

## 2021-04-24 RX ADMIN — FERROUS SULFATE TAB EC 325 MG (65 MG FE EQUIVALENT) 325 MG: 325 (65 FE) TABLET DELAYED RESPONSE at 09:51

## 2021-04-24 RX ADMIN — SODIUM CHLORIDE: 9 INJECTION, SOLUTION INTRAVENOUS at 17:06

## 2021-04-24 RX ADMIN — GABAPENTIN 300 MG: 300 CAPSULE ORAL at 09:51

## 2021-04-24 RX ADMIN — CLOPIDOGREL 75 MG: 75 TABLET, FILM COATED ORAL at 09:51

## 2021-04-24 RX ADMIN — HYDROCODONE BITARTRATE AND ACETAMINOPHEN 1 TABLET: 5; 325 TABLET ORAL at 13:48

## 2021-04-24 RX ADMIN — FOLIC ACID 1 MG: 1 TABLET ORAL at 09:50

## 2021-04-24 RX ADMIN — Medication 1 TABLET: at 09:51

## 2021-04-24 RX ADMIN — BUDESONIDE AND FORMOTEROL FUMARATE DIHYDRATE 2 PUFF: 160; 4.5 AEROSOL RESPIRATORY (INHALATION) at 09:37

## 2021-04-24 RX ADMIN — FAMOTIDINE 20 MG: 20 TABLET, FILM COATED ORAL at 21:04

## 2021-04-24 RX ADMIN — CARVEDILOL 6.25 MG: 12.5 TABLET, FILM COATED ORAL at 09:50

## 2021-04-24 RX ADMIN — MIRTAZAPINE 30 MG: 30 TABLET, FILM COATED ORAL at 21:04

## 2021-04-24 RX ADMIN — HYDROCODONE BITARTRATE AND ACETAMINOPHEN 1 TABLET: 5; 325 TABLET ORAL at 21:55

## 2021-04-24 RX ADMIN — HYDROCODONE BITARTRATE AND ACETAMINOPHEN 1 TABLET: 5; 325 TABLET ORAL at 09:52

## 2021-04-24 RX ADMIN — CARVEDILOL 6.25 MG: 12.5 TABLET, FILM COATED ORAL at 17:02

## 2021-04-24 RX ADMIN — GABAPENTIN 300 MG: 300 CAPSULE ORAL at 13:44

## 2021-04-24 RX ADMIN — FAMOTIDINE 20 MG: 20 TABLET, FILM COATED ORAL at 09:51

## 2021-04-24 RX ADMIN — BUTALBITAL, ACETAMINOPHEN AND CAFFEINE 1 TABLET: 50; 325; 40 TABLET ORAL at 09:49

## 2021-04-24 RX ADMIN — BUTALBITAL, ACETAMINOPHEN AND CAFFEINE 1 TABLET: 50; 325; 40 TABLET ORAL at 17:45

## 2021-04-24 ASSESSMENT — PAIN SCALES - GENERAL
PAINLEVEL_OUTOF10: 8
PAINLEVEL_OUTOF10: 7
PAINLEVEL_OUTOF10: 9
PAINLEVEL_OUTOF10: 7

## 2021-04-24 ASSESSMENT — PAIN DESCRIPTION - ORIENTATION: ORIENTATION: RIGHT

## 2021-04-24 ASSESSMENT — ENCOUNTER SYMPTOMS
VOMITING: 0
NAUSEA: 0
COUGH: 0
SHORTNESS OF BREATH: 0
ABDOMINAL PAIN: 1

## 2021-04-24 ASSESSMENT — PAIN DESCRIPTION - LOCATION: LOCATION: BACK

## 2021-04-24 ASSESSMENT — PAIN DESCRIPTION - FREQUENCY: FREQUENCY: CONTINUOUS

## 2021-04-24 ASSESSMENT — PAIN DESCRIPTION - PAIN TYPE: TYPE: CHRONIC PAIN

## 2021-04-24 NOTE — PLAN OF CARE
Problem: Falls - Risk of:  Goal: Will remain free from falls  Description: Will remain free from falls  Outcome: Ongoing  Goal: Absence of physical injury  Description: Absence of physical injury  Outcome: Ongoing     Problem: Pain:  Goal: Pain level will decrease  Description: Pain level will decrease  Outcome: Ongoing  Goal: Control of acute pain  Description: Control of acute pain  Outcome: Ongoing  Goal: Control of chronic pain  Description: Control of chronic pain  Outcome: Ongoing     Problem: Nutrition  Goal: Optimal nutrition therapy  Description: Nutrition Problem #1: Inadequate oral intake  Intervention: Food and/or Nutrient Delivery: Continue Current Diet, Start Oral Nutrition Supplement  Nutritional Goals: PO intake to meet >75% of estimated nutrition needs      Outcome: Ongoing     Problem: Health Behavior:  Goal: Ability to identify and utilize available support systems will improve  Description: Ability to identify and utilize available support systems will improve  Outcome: Ongoing  Goal: Compliance with therapeutic regimen will improve  Description: Compliance with therapeutic regimen will improve  Outcome: Ongoing  Goal: Ability to keep healthcare appointments will improve  Description: Ability to keep healthcare appointments will improve  Outcome: Ongoing

## 2021-04-24 NOTE — PROGRESS NOTES
[facility]    Office: [address], DO, Yaima Rivers, DO, Lynne Aquino, DO, Sam Diehl Blood, DO, Kesha Osorio MD, David Lugo MD, Lili Corado MD, Paola Sullivan MD, Teresa Muñiz MD, Melody Cruz MD, Rachell Hernandez MD, Lyndon Lutz MD, Camiu Hallie Edge MD, Denise Quiroz, DO, Deborah Main MD, Susan Rosas MD, Mennie Holter, DO, Demarcus Noyola MD,  Kevin Yousif, DO, Jacki Otoole MD, Soo Owens MD, Geovany Coffman, [facility], [address], [facility], Giovanni Herrera, [facility], Angella Phipps, CNS, Stephen Wood, CNP, Yuli Romero, CNP, Paulo Salinas, CNP, Chris Jennings, CNP, Feliberto Walker, CNP, EVELINA HarrisC, Neetu Bee, [facility], Maritza Jacob CNP, Lamont Schmitt, CNP, Kris Vieyra, CNP, Chelle Reeder CNP, Obdulio Vinson, [address]    Progress Note    4/24/2021    5:26 PM    Name:   Adri Riddle  MRN:     [id number]     Acct:      [de-identified]   Room:   [address] [place] [address] Day:  3  Admit Date:  4/21/2021 11:00 AM    PCP:   No primary care provider on file.   Code Status:  Full Code    Subjective:     C/C:   Chief Complaint   Patient presents with    Abdominal Pain    Chest Pain    Headache       Interval History Status:  Improved  Looks better  Feels better  Less agitated  Thoughts better organized    Data Base Updates:  EKG:  Normal sinus rhythm   Minimal voltage criteria for LVH, may be normal variant   ST & T wave abnormality, consider inferior ischemia   ST & T wave abnormality, consider anterolateral ischemia   Abnormal ECG   When compared with ECG of 21-APR-2021 11:03,   T wave inversion now evident in Inferior leads   Inverted T waves have replaced nonspecific T wave abnormality in Anterior leads   QT has shortened       Brief History:     As documented in the medical record:  \"Emi Daniels is a 74 y.o. female with past medical history for COPD, diastolic heart failure, depression, chronic hepatitis, homelessness presents to the emergency department for right-sided chest pain and anxiety. Patient was previously seen earlier this month for a pancreatic mass which was biopsied. 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 She is recommended follow-up with general surgery which was supposed to be today. She states she has been so anxious about her diagnosis that she start developing chest pain and shortness of breath. She is unable to go to her appointment today. She states she has difficulty with follow-up due to scheduling issue, does not recall having a conversation with gastroenterology even though this conversation had been charted. During my examination she has reproducible chest wall pain, shortness of breath is improved. She had episode of hypoglycemia while in the emergency department and was given D50, she was also noted to have an CRYSTAL and was admitted for this. General surgery was consulted for recommendations regarding chest, patient is homeless, may not follow-up outpatient. The initial assessment and plan included:  Acute kidney Injury -creatinine 1.46 with a baseline 0.54,  start IV fluids, hold lisinopril. CT abdomen pelvis showing no obstruction. Could be from hypotension versus ACE inhibitor    Right-sided chest pain -chronic issue, did not think ACS. Worsens with palpation and deep inspiration, pain management    Homelessness -patient readmitted after recent discharge, unable to follow-up with appointments. Consult social work for recommendations as patient seems unable to take care of herself    GIST -gastroenterology seems to have contacted her regarding results with patient have no recollection. Patient is homeless, unable to follow-up outpatient and seems to have poor memory.   Will consult general surgery to see if resection could possibly be performed inpatient    She had EUS with Bx  Surgical Pathology Report 04/05/2021  1:17  [place]    -- Diagnosis --   PANCREATIC LESION, EUS GUIDED FNB:          GASTROINTESTINAL STROMAL TUMOR (GIST). Hypoglycemia -given amp of D50, continue to monitor. Severe protein calorie malnutritionlikely related to her homelessness  COPD  CVA  Tobacco abuse  Essential hypertension  Chronic hepatitis C\"     Troponin, High [id number]     Results for Sarah Benavides (MRN [id number]) as of 4/22/2021 14:15   Ref. Range 10/20/2020 17:50 4/3/2021 19:33 4/4/2021 19:06 4/6/2021 08:37 4/21/2021 11:21   Creatinine Latest Ref Range: 0.50 - 0.90 mg/dL 0.92 (H) 0.56 0.64 0.54 1.46 (H)     MRI 1/2020  Chronic microvascular disease with a focus of restricted diffusion in the   left posterior frontal lobe consistent with acute/subacute ischemia.       Mild chronic sinusitis in the maxillary sinuses. CT abdomen 4/21/2021  No acute abdominal or pelvic abnormality.       Redemonstration of a peripherally enhancing mass in the pancreatic body that   is not significantly changed compared to prior examination. Medications: Allergies:     Allergies   Allergen Reactions    Aspirin Itching    Keflet [Cephalexin]      Tolerated penicillin V 12/13, tolerates cephalexin 6/14       Current Meds:   Scheduled Meds:    lidocaine 1 % injection  5 mL Intradermal Once    sodium chloride flush  5-40 mL Intravenous 2 times per day    amitriptyline  10 mg Oral Nightly    atorvastatin  40 mg Oral Nightly    carvedilol  6.25 mg Oral BID WC    clopidogrel  75 mg Oral Daily    famotidine  20 mg Oral BID    ferrous sulfate  325 mg Oral Daily    folic acid  1 mg Oral Daily    gabapentin  300 mg Oral TID    mirtazapine  30 mg Oral Nightly    budesonide-formoterol  2 puff Inhalation BID    therapeutic multivitamin-minerals  1 tablet Oral Daily    sodium chloride flush  5-40 mL Intravenous 2 times per day     Continuous Infusions:    sodium chloride      sodium chloride      dextrose      sodium chloride 100 mL/hr at 04/24/21 1706    heparin (PORCINE) Infusion Stopped (04/22/21 0050)     PRN Meds: butalbital-acetaminophen-caffeine, sodium chloride flush, sodium chloride, HYDROcodone 5 mg - acetaminophen, ipratropium-albuterol, sodium chloride flush, sodium chloride, potassium chloride **OR** potassium alternative oral replacement **OR** potassium chloride, magnesium sulfate, promethazine **OR** ondansetron, polyethylene glycol, acetaminophen **OR** acetaminophen, hydrOXYzine, glucose, dextrose, glucagon (rDNA), dextrose, heparin (porcine), heparin (porcine)    Data:     Past Medical History:   has a past medical history of Appendicitis, Cerebral artery occlusion with cerebral infarction ([address] CHF (congestive heart failure) ([address] Chronic respiratory failure with hypoxia and hypercapnia ([address] Chronic rhinitis, Cigarette smoker, COPD (chronic obstructive pulmonary disease) ([address] Depression, Dysphagia, Essential hypertension, GERD (gastroesophageal reflux disease), Heart failure, diastolic, with acute decompensation ([address] Hepatitis C, chronic ([address] History of smoking at least 1 pack per day for at least 30 years, Hyperlipidemia, Hypertension, Migraine, Moderate COPD (chronic obstructive pulmonary disease) ([address] Multiple transfusions, Neurogenic dysphagia, Osteoarthritis, Pneumonia, Scoliosis, and Substance abuse ([address] Social History:   reports that she quit smoking about 2 years ago. Her smoking use included cigarettes. She started smoking about 51 years ago. She has a 40.00 pack-year smoking history. She has never used smokeless tobacco. She reports that she does not drink alcohol or use drugs.      Family History:   Family History   Problem Relation Age of Onset    Breast Cancer Mother     Heart Disease Father        Review of Systems:     Review of Systems   Constitutional: Positive for activity change (Diminished) and appetite change (Decreased). Respiratory: Negative for cough and shortness of breath. Cardiovascular: Positive for chest pain (Right rib pain). Negative for palpitations. Gastrointestinal: Positive for abdominal pain (Chronic right-sided rib and abdominal pain). Negative for nausea and vomiting. Genitourinary: Negative for flank pain and hematuria. Musculoskeletal: Positive for arthralgias and myalgias. The patient has chronic arthralgias and myalgias    Psychiatric/Behavioral: Negative for agitation and confusion. Physical Examination:        Physical Exam  Vitals signs reviewed. Constitutional:       General: She is not in acute distress. Appearance: She is not diaphoretic. HENT:      Head: Normocephalic. Nose: Nose normal.      Mouth/Throat:      Comments: Dentition poor  Eyes:      General: No scleral icterus. Conjunctiva/sclera: Conjunctivae normal.   Neck:      Musculoskeletal: Neck supple. Trachea: No tracheal deviation. Cardiovascular:      Rate and Rhythm: Normal rate and regular rhythm. Pulmonary:      Effort: Pulmonary effort is normal. No respiratory distress. Breath sounds: Normal breath sounds. No wheezing or rales. Chest:      Chest wall: No tenderness. Abdominal:      General: Bowel sounds are normal. There is no distension. Palpations: Abdomen is soft. Tenderness: There is no abdominal tenderness. There is no guarding or rebound. Musculoskeletal:         General: No tenderness. Comments: Muscle mass decreased   Skin:     General: Skin is warm and dry. Neurological:      Mental Status: She is alert.    Psychiatric:      Comments: Less agitated  Thoughts better organized       Vitals:  BP (!) 153/72   Pulse 85   Temp 97.8 °F (36.6 °C) (Oral)   Resp 16   Ht 4' 11\" (1.499 m)   Wt 92 lb 8 oz (42 kg)   LMP 2015   SpO2 97%   BMI 18.68 kg/m²   Temp (24hrs), Av.4 °F (36.9 °C), Min:97.8 °F (36.6 °C), Max:99 °F (37.2 °C)    Recent Labs     04/23/21  2048 04/24/21  0826 04/24/21  1115 04/24/21  1651   POCGLU 136* 122* 128* 220*       I/O (24Hr): Intake/Output Summary (Last 24 hours) at 4/24/2021 1726  Last data filed at 4/24/2021 1706  Gross per 24 hour   Intake 3120 ml   Output 650 ml   Net 2470 ml       Labs:  Hematology:  Recent Labs     04/23/21  0607   WBC 4.4   RBC 3.08*   HGB 9.9*   HCT 30.9*   .3   MCH 32.1   MCHC 32.0   RDW 13.9   *   MPV 9.1     Chemistry:  Recent Labs     04/23/21  0607      K 4.0   *   CO2 19*   GLUCOSE 96   BUN 15   CREATININE 1.12*   MG 1.2*   ANIONGAP 8*   LABGLOM 48*   GFRAA 59*   CALCIUM 7.8*   PHOS 2.4*     Recent Labs     04/23/21  0607 04/23/21  0607 04/23/21  1238 04/23/21  1627 04/23/21  2048 04/24/21  0826 04/24/21  1115 04/24/21  1651   LABALBU 3.2*  --   --   --   --   --   --   --    POCGLU  --    < > 102 166* 136* 122* 128* 220*    < > = values in this interval not displayed. ABG:  Lab Results   Component Value Date    POCPH 7.366 01/08/2019    POCPCO2 51.0 01/08/2019    POCPO2 65.9 01/08/2019    POCHCO3 29.2 01/08/2019    NBEA NOT REPORTED 01/08/2019    PBEA 3 01/08/2019    VFH2QYD 31 01/08/2019    MNVU3NPJ 92 01/08/2019    FIO2 NOT REPORTED 09/04/2019     Lab Results   Component Value Date/Time    SPECIAL NOT REPORTED 04/04/2021 12:09 AM     Lab Results   Component Value Date/Time    CULTURE NO SIGNIFICANT GROWTH 04/04/2021 12:09 AM       Radiology:  Ct Abdomen Pelvis W Iv Contrast Additional Contrast? None    Result Date: 4/21/2021  No acute abdominal or pelvic abnormality. Redemonstration of a peripherally enhancing mass in the pancreatic body that is not significantly changed compared to prior examination.          Assessment:        Principal Problem:    CRYSTAL (acute kidney injury) ([address]  Active Problems:    Essential hypertension    History of substance abuse ([address]    Chronic heart failure with reduced ejection fraction and diastolic dysfunction (HCC)    Alcoholic cirrhosis of liver without ascites (HCC)    Psychosis, intermittent requring antipsychotic meds    Pancreas head lesion    Polysubstance abuse ([facility]    Avascular necrosis of bone of right hip (HCC)    COPD without exacerbation (HCC)    Hypokalemia    History of hepatitis C    Protein calorie malnutrition (HCC)    Avascular necrosis of left femur (HCC)    Multiple subsegmental pulmonary emboli without acute cor pulmonale (HCC)    Pancreatic mass    Homeless    Right-sided chest wall pain    H/O malignant gastrointestinal stromal tumor (GIST)    Gastrointestinal stromal tumor (GIST) (HCC)    Hypoglycemia    History of pulmonary embolism    Noncompliance    Crack cocaine use    Episode of recurrent major depressive disorder ([facility]  Resolved Problems:    * No resolved hospital problems. *      Plan:        Check bun and creatinine  Lisinopril on hold  Pain control  Oncology evaluation - GIST, pancreatic mass, thrombocytosis in progress  Social service consult, the patient has been homeless  Blood Pressure - Monitor and control   Nutritional supplementation  Correct electrolyte abnormalities  Trend troponins, doubt non-ST elevation MI  Glycemic contol - monitor and control blood sugars   Hx of PE last July, Heparin initiated   Eliquis on hold  The patient is homeless  She was thrown out of the [employer] for drug use  She no longer has a PCP  Family reports chronic noncompliance  Palliative care consulted  Service consult for placement  [facility] referral for surgical treatment of her GIST tumor  Discharge planning  Will discharge when arrangements complete and ok with other services. Follow-up with PCP in one week, No primary care provider on file.   Notify PCP of discharge     IP CONSULT TO HOSPITALIST  IP CONSULT TO SOCIAL WORK  IP CONSULT TO GENERAL SURGERY  IP CONSULT TO IV TEAM  IP CONSULT TO HEM/ONC  IP CONSULT TO PSYCHIATRY  IP CONSULT TO

## 2021-04-24 NOTE — PLAN OF CARE
Will continue home medication of norvasc  2/4  ARB Losartan discontinued   Add Hydralazine with Nitroglycerin    BRONCHOSPASM/BRONCHOCONSTRICTION     [x]         IMPROVE AERATION/BREATH SOUNDS  [x]   ADMINISTER BRONCHODILATOR THERAPY AS APPROPRIATE  [x]   ASSESS BREATH SOUNDS  []   IMPLEMENT AEROSOL/MDI PROTOCOL  [x]   PATIENT EDUCATION AS NEEDED

## 2021-04-24 NOTE — PROGRESS NOTES
Physical Therapy  Facility/Department: [facility]  Daily Treatment Note  NAME: Emi Daniels  : 1955  MRN: [id number]    Date of Service: 2021    Discharge Recommendations:  Patient would benefit from continued therapy after discharge        Assessment   Body structures, Functions, Activity limitations: Decreased functional mobility ; Decreased strength;Decreased endurance;Decreased balance  Assessment: pt cooper mobility. pt defer ther ex d/t having a headache & requesting to rest.        Patient Diagnosis(es): The primary encounter diagnosis was CRYSTAL (acute kidney injury) ([address] Diagnoses of Hypoglycemia, Gastrointestinal stromal tumor (GIST) ([address] and Right upper quadrant abdominal pain were also pertinent to this visit. has a past medical history of Appendicitis, Cerebral artery occlusion with cerebral infarction ([address] CHF (congestive heart failure) ([address] Chronic respiratory failure with hypoxia and hypercapnia (HCC), Chronic rhinitis, Cigarette smoker, COPD (chronic obstructive pulmonary disease) ([address] Depression, Dysphagia, Essential hypertension, GERD (gastroesophageal reflux disease), Heart failure, diastolic, with acute decompensation ([address] Hepatitis C, chronic ([address] History of smoking at least 1 pack per day for at least 30 years, Hyperlipidemia, Hypertension, Migraine, Moderate COPD (chronic obstructive pulmonary disease) ([address] Multiple transfusions, Neurogenic dysphagia, Osteoarthritis, Pneumonia, Scoliosis, and Substance abuse ([address] has a past surgical history that includes  section; LEEP (); Upper gastrointestinal endoscopy (); Gastric bypass surgery; Appendectomy (2017); laparoscopic appendectomy (N/A, 3/27/2017); Upper gastrointestinal endoscopy (2018);  Upper gastrointestinal endoscopy (N/A, 2018); pr colonoscopy w/biopsy single/multiple (N/A, 11/10/2018); pr egd transoral biopsy single/multiple (N/A, 2018); hip surgery (Right, 08/13/2019); Total hip arthroplasty (Right, 8/13/2019); Medication Injection (Left, 3/25/2021); and Upper gastrointestinal endoscopy (N/A, 4/5/2021). Restrictions  Restrictions/Precautions  Restrictions/Precautions: Up as Tolerated  Required Braces or Orthoses?: No  Position Activity Restriction  Other position/activity restrictions: up with assist  Subjective   General  Chart Reviewed: Yes  Subjective  Subjective: pt agreeable to mobility. pt with reports of having a headache. General Comment  Comments: Upon arrival pt in bed. Pain Screening  Patient Currently in Pain: Yes  Pain Assessment  Pain Level: 7(L hip pain, chronic)  Patient's Stated Pain Goal: (not stated)  Vital Signs  Patient Currently in Pain: Yes       Orientation  Orientation  Overall Orientation Status: Within Normal Limits  Cognition      Objective   Bed mobility  Supine to Sit: Stand by assistance(to get to R EOB, HOB elevated R bed rail used)  Transfers  Sit to Stand: Stand by assistance(from EOB, RW)  Stand to sit: Stand by assistance(from EOB, RW)  Bed to Chair: (pt defer d/t c/o having a headache)  Ambulation  Ambulation?: Yes  Ambulation 1  Surface: level tile  Device: Rolling Walker  Quality of Gait: fair minus, antalgic gait  Gait Deviations: Slow Geovanna  Distance: 15'  Comments: antalgic gait noted  Wheelchair Activities  Level of Assistance for pressure relief activities: Stand by assistance  Gait Deviations  Gait Deviations: Slow Geovanna;Decreased step length   transfer from bedside commode <-> bed: SBA    Goals  Short term goals  Time Frame for Short term goals: 10 visits  Short term goal 1: Independent bed mobility  Short term goal 2: Independent transfers  Short term goal 3: Independent ambulation with cane. Short term goal 4: Pt to tolerate 30 minutes of activity to improve strength and endurance. Patient Goals   Patient goals : Move without pain.     Plan    Plan  Times per week: 5x/week  Current Treatment Recommendations: Strengthening, Transfer Training, Endurance Training, Functional Mobility Training, Gait Training, Balance Training, Safety Education & Training  Safety Devices  Type of devices: Call light within reach, Left in bed, Nurse notified     Therapy Time   Individual Concurrent Group Co-treatment   Time In  8:56         Time Out  9:08         Minutes  15'                 [facility]

## 2021-04-24 NOTE — CARE COORDINATION
Transitional Planning:    [facility] to see if they heard anything yesterday regarding precert, they stated they have not heard back yet. 1142- transport packet prepared and placed on CM desk on 4c.

## 2021-04-24 NOTE — PROGRESS NOTES
hypercapnia ([facility] [address] Chronic rhinitis, Cigarette smoker, COPD (chronic obstructive pulmonary disease) ([address] Depression, Dysphagia, Essential hypertension, GERD (gastroesophageal reflux disease), Heart failure, diastolic, with acute decompensation ([address] Hepatitis C, chronic ([address] History of smoking at least 1 pack per day for at least 30 years, Hyperlipidemia, Hypertension, Migraine, Moderate COPD (chronic obstructive pulmonary disease) ([address] Multiple transfusions, Neurogenic dysphagia, Osteoarthritis, Pneumonia, Scoliosis, and Substance abuse ([address] Past Surgical History:   has a past surgical history that includes  section; LEEP (); Upper gastrointestinal endoscopy (); Gastric bypass surgery; Appendectomy (2017); laparoscopic appendectomy (N/A, 3/27/2017); Upper gastrointestinal endoscopy (2018); Upper gastrointestinal endoscopy (N/A, 2018); pr colonoscopy w/biopsy single/multiple (N/A, 11/10/2018); pr egd transoral biopsy single/multiple (N/A, 2018); hip surgery (Right, 2019); Total hip arthroplasty (Right, 2019); Medication Injection (Left, 3/25/2021); and Upper gastrointestinal endoscopy (N/A, 2021). Medications:    Prior to Admission medications    Medication Sig Start Date End Date Taking? Authorizing Provider   famotidine (PEPCID) 20 MG tablet Take 1 tablet by mouth 2 times daily 21   Juan A España, DO   mometasone-formoterol [facility]) 100-5 MCG/ACT inhaler Inhale 2 puffs into the lungs 2 times daily 21   Winda Patria, DO   mirtazapine (REMERON) 30 MG tablet Take 1 tablet by mouth nightly 21   Juan A España, DO   lisinopril (PRINIVIL;ZESTRIL) 10 MG tablet Take 1 tablet by mouth daily 21   Juan A España, DO   gabapentin (NEURONTIN) 300 MG capsule Take 1 capsule by mouth 3 times daily for 30 days.  21  Juan A España DO   ferrous sulfate (MELISSA-HAO) 325 (65 Fe) MG tablet Take 1 tablet by mouth daily 21   Juan A España DO   clopidogrel (PLAVIX) 75 MG tablet Take 1 tablet by mouth daily 4/6/21   Yelitza Sherwood, DO   carvedilol (COREG) 6.25 MG tablet Take 1 tablet by mouth 2 times daily (with meals) 4/6/21   Yelitza Sherwood, DO   atorvastatin (LIPITOR) 40 MG tablet Take 1 tablet by mouth nightly 4/6/21   Yelitza Sherwood, DO   apixaban (ELIQUIS) 5 MG TABS tablet Take 1 tablet by mouth 2 times daily 4/6/21   Yelitza Sherwood, DO   amitriptyline (ELAVIL) 10 MG tablet Take 1 tablet by mouth nightly 4/6/21   Yelitza Sherwood, DO   albuterol sulfate HFA (PROVENTIL HFA) 108 (90 Base) MCG/ACT inhaler Inhale 1-2 puffs into the lungs once for 1 dose 4/6/21 4/6/21  Yelitza Sherwood, DO   butalbital-acetaminophen-caffeine (FIORICET, ESGIC) -99 MG per tablet Take 1 tablet by mouth every 12 hours for 5 doses 3/28/21 3/31/21  Debbie Dc MD   Elastic Bandages & Supports (LUMBAR BACK BRACE/SUPPORT PAD) MISC 1 Units by Does not apply route daily 2/10/21 2/10/22  Edna Pompa MD   amLODIPine (NORVASC) 10 MG tablet Take 1 tablet by mouth daily 2/9/21 2/9/21  Jeramy Glover MD   Sennosides (SENNA) 8.6 MG CAPS 8.6 capsules 8/2/20   Historical MD Marnie   [facility].  Devices [facility]) MISC 1 Device by Does not apply route once for 1 dose 8/5/19 8/5/19  Genoveva Dudley PA-C   ipratropium-albuterol (DUONEB) 0.5-2.5 (3) MG/3ML SOLN nebulizer solution Inhale 3 mLs into the lungs every 4 hours as needed for Shortness of Breath 1/11/19   Burnadette Shone, MD   folic acid (FOLVITE) 1 MG tablet Take 1 tablet by mouth daily 10/13/18   Yessy Oleary MD   vitamin B-1 100 MG tablet Take 1 tablet by mouth daily 10/13/18   Yessy Oleary MD   vitamin B-12 250 MCG tablet Take 1 tablet by mouth daily 10/13/18   Yessy Oleary MD   Multiple Vitamins-Minerals (THERAPEUTIC MULTIVITAMIN-MINERALS) tablet Take 1 tablet by mouth daily 8/12/18   Marilee Menjivar MD     Current Facility-Administered Medications   Medication Dose Route Frequency Provider Last Rate Last Admin    lidocaine 1 % injection 5 mL 5 mL Intradermal Once Yelena Burundian, DO   Stopped at 04/22/21 9791    sodium chloride flush 0.9 % injection 5-40 mL  5-40 mL Intravenous 2 times per day Yelena Burundian, DO   10 mL at 04/23/21 2042    sodium chloride flush 0.9 % injection 5-40 mL  5-40 mL Intravenous PRN Yelena Burundian, DO        0.9 % sodium chloride infusion  25 mL Intravenous PRN Yelena Burundian, DO        HYDROcodone-acetaminophen [facility]) 5-325 MG per tablet 1 tablet  1 tablet Oral Q4H PRN Yelena Burundian, DO   1 tablet at 04/23/21 1935    amitriptyline (ELAVIL) tablet 10 mg  10 mg Oral Nightly Marlyne Cure, APRN - NP   10 mg at 04/23/21 2040    atorvastatin (LIPITOR) tablet 40 mg  40 mg Oral Nightly Marlyne Cure, APRN - NP   40 mg at 04/23/21 2039    carvedilol (COREG) tablet 6.25 mg  6.25 mg Oral BID WC Marlyne Cure, APRN - NP   6.25 mg at 04/23/21 1826    clopidogrel (PLAVIX) tablet 75 mg  75 mg Oral Daily Marlyne Cure, APRN - NP   75 mg at 04/23/21 0923    famotidine (PEPCID) tablet 20 mg  20 mg Oral BID Marlyne Cure, APRN - NP   20 mg at 04/23/21 8548    ferrous sulfate (FE TABS 325) EC tablet 325 mg  325 mg Oral Daily Marlyne Cure, APRN - NP   325 mg at 36/12/73 9321    folic acid (FOLVITE) tablet 1 mg  1 mg Oral Daily Marlyne Cure, APRN - NP   1 mg at 04/23/21 1972    gabapentin (NEURONTIN) capsule 300 mg  300 mg Oral TID Marlyne Cure, APRN - NP   300 mg at 04/23/21 2040    ipratropium-albuterol (DUONEB) nebulizer solution 3 mL  1 vial Inhalation Q4H PRN Marlyne Cure, APRN - NP        mirtazapine (REMERON) tablet 30 mg  30 mg Oral Nightly Marlyne Cure, APRN - NP   30 mg at 04/23/21 2040    budesonide-formoterol (SYMBICORT) 160-4.5 MCG/ACT inhaler 2 puff  2 puff Inhalation BID Marlyne Cure, APRN - NP   2 puff at 04/23/21 2130    therapeutic multivitamin-minerals 1 tablet  1 tablet Oral Daily Marlyne Cure, APRN - NP   1 tablet at 04/23/21 9178    sodium chloride flush 0.9 % injection 5-40 mL  5-40 mL Intravenous 2 times per day Moe Jansen, APRN - NP   Stopped at 04/22/21 2207    sodium chloride flush 0.9 % injection 10 mL  10 mL Intravenous PRN Moe Jansen, APRN - NP        0.9 % sodium chloride infusion  25 mL Intravenous PRN Moe Jansen, APRN - NP        potassium chloride (KLOR-CON M) extended release tablet 40 mEq  40 mEq Oral PRN Moe Jansen, APRN - NP        Or    potassium bicarb-citric acid (EFFER-K) effervescent tablet 40 mEq  40 mEq Oral PRN Moe Jansen, APRN - NP        Or   [facility] potassium chloride 10 mEq/100 mL IVPB (Peripheral Line)  10 mEq Intravenous PRN Moe Jansen, APRN - NP        magnesium sulfate 1000 mg in dextrose 5% 100 mL IVPB  1,000 mg Intravenous PRN Moe Jansen, APRN - NP        promethazine (PHENERGAN) tablet 12.5 mg  12.5 mg Oral Q6H PRN Moe Jansen, APRN - NP        Or    ondansetron [facility]) injection 4 mg  4 mg Intravenous Q6H PRN Moe Jansen, APRN - NP   4 mg at 04/23/21 2042    polyethylene glycol (GLYCOLAX) packet 17 g  17 g Oral Daily PRN Moe Jansen, APRN - NP        acetaminophen (TYLENOL) tablet 650 mg  650 mg Oral Q6H PRN Moe Jansen, APRN - NP        Or   [facility] acetaminophen (TYLENOL) suppository 650 mg  650 mg Rectal Q6H PRN Moe Jansen, APRN - NP        hydrOXYzine (ATARAX) tablet 25 mg  25 mg Oral TID PRN Moe Jansen, APRN - NP   25 mg at 04/22/21 2103    glucose (GLUTOSE) 40 % oral gel 15 g  15 g Oral PRN Moe Jansen, APRN - NP        dextrose 50 % IV solution  12.5 g Intravenous PRN Moe Jansen, APRN - NP        glucagon (rDNA) injection 1 mg  1 mg Intramuscular PRN Moe Jansen, APRN - NP        dextrose 5 % solution  100 mL/hr Intravenous PRN Moe Jansen, APRN - NP        0.9 % sodium chloride infusion   Intravenous Continuous Juan A España  mL/hr at 04/22/21 2103 New Bag at 04/22/21 2103    heparin (porcine) injection 3,430 Units  80 Units/kg Intravenous PRN Juan A España DO       [facility] heparin (porcine) injection 1,720 Units  40 Units/kg Intravenous PRN Jaclyn Mosquera DO        heparin 25,000 units in dextrose 5% 250 mL (premix) infusion  5-30 Units/kg/hr Intravenous Continuous Jaclyn Mosquera DO   Stopped at 21 0050       Allergies:  Aspirin and Keflet [cephalexin]    Social History:   reports that she quit smoking about 2 years ago. Her smoking use included cigarettes. She started smoking about 51 years ago. She has a 40.00 pack-year smoking history. She has never used smokeless tobacco. She reports that she does not drink alcohol or use drugs. Family History: family history includes Breast Cancer in her mother; Heart Disease in her father. REVIEW OF SYSTEMS:      Constitutional: No fever or chills. No night sweats, positive weight loss. Eyes: No eye discharge, double vision, or eye pain   HEENT: negative for sore mouth, sore throat, hoarseness and voice change   Respiratory: negative for cough , sputum, dyspnea, wheezing, hemoptysis, chest pain   Cardiovascular: negative for chest pain, dyspnea, palpitations, orthopnea, PND   Gastrointestinal: Positive abdominal pain  Genitourinary: negative for frequency, dysuria, nocturia, urinary incontinence, and hematuria   Integument: negative for rash, skin lesions, bruises. Hematologic/Lymphatic: negative for easy bruising, bleeding, lymphadenopathy, or petechiae   Endocrine: negative for heat or cold intolerance,weight changes, change in bowel habits and hair loss   Musculoskeletal: negative for myalgias, arthralgias, pain, joint swelling,and bone pain   Neurological: negative for headaches, dizziness, seizures, weakness, numbness. Positive for memory.     PHYSICAL EXAM:        BP (!) 156/88   Pulse 82   Temp 99 °F (37.2 °C) (Oral)   Resp 18   Ht 4' 11\" (1.499 m)   Wt 94 lb 12.8 oz (43 kg)   LMP 2015   SpO2 99%   BMI 19.15 kg/m²    Temp (24hrs), Av.6 °F (37 °C), Min:97.7 °F (36.5 °C), Max:99.1 °F (37.3 °C)      General appearance -frail appearing, no in pain or distress   Mental status - alert and cooperative   Eyes - pupils equal and reactive, extraocular eye movements intact   Ears - bilateral TM's and external ear canals normal   Mouth - mucous membranes moist, pharynx normal without lesions   Neck - supple, no significant adenopathy   Lymphatics - no palpable lymphadenopathy, no hepatosplenomegaly   Chest - clear to auscultation, no wheezes, rales or rhonchi, symmetric air entry   Heart - normal rate, regular rhythm, normal S1, S2, no murmurs  Abdomen -soft. Nondistended.   Positive tender  Neurological - alert, oriented, normal speech, no focal findings or movement disorder noted   Musculoskeletal - no joint tenderness, deformity or swelling   Extremities - peripheral pulses normal, no pedal edema, no clubbing or cyanosis   Skin - normal coloration and turgor, no rashes, no suspicious skin lesions noted ,      DATA:      Labs:     Results for orders placed or performed during the hospital encounter of 04/21/21   CBC   Result Value Ref Range    WBC 5.1 3.5 - 11.3 k/uL    RBC 4.24 3.95 - 5.11 m/uL    Hemoglobin 13.7 11.9 - 15.1 g/dL    Hematocrit 42.2 36.3 - 47.1 %    MCV 99.5 82.6 - 102.9 fL    MCH 32.3 25.2 - 33.5 pg    MCHC 32.5 28.4 - 34.8 g/dL    RDW 13.3 11.8 - 14.4 %    Platelets 687 (H) 099 - 453 k/uL    MPV 9.0 8.1 - 13.5 fL    NRBC Automated 0.0 0.0 per 100 WBC   COMPREHENSIVE METABOLIC PANEL   Result Value Ref Range    Glucose 46 (L) 70 - 99 mg/dL    BUN 23 8 - 23 mg/dL    CREATININE 1.46 (H) 0.50 - 0.90 mg/dL    Bun/Cre Ratio NOT REPORTED 9 - 20    Calcium 9.3 8.6 - 10.4 mg/dL    Sodium 139 135 - 144 mmol/L    Potassium 3.6 (L) 3.7 - 5.3 mmol/L    Chloride 103 98 - 107 mmol/L    CO2 16 (L) 20 - 31 mmol/L    Anion Gap 20 (H) 9 - 17 mmol/L    Alkaline Phosphatase 83 35 - 104 U/L    ALT 20 5 - 33 U/L    AST 39 (H) <32 U/L    Total Bilirubin 0.46 0.3 - 1.2 mg/dL    Total Protein 8.4 (H) 6.4 - 8.3 g/dL    Albumin 4.2 3.5 - 5.2 g/dL    Albumin/Globulin Ratio 1.0 1.0 - 2.5    GFR Non- 36 (L) >60 mL/min    GFR  43 (L) >60 mL/min    GFR Comment          GFR Staging NOT REPORTED    LIPASE   Result Value Ref Range    Lipase 54 13 - 60 U/L   LACTIC ACID, WHOLE BLOOD   Result Value Ref Range    Lactic Acid, Whole Blood 1.8 0.7 - 2.1 mmol/L   Troponin   Result Value Ref Range    Troponin, High Sensitivity 19 (H) 0 - 14 ng/L    Troponin T NOT REPORTED <0.03 ng/mL    Troponin Interp NOT REPORTED    Urinalysis with microscopic   Result Value Ref Range    Color, UA YELLOW YELLOW    Turbidity UA CLEAR CLEAR    Glucose, Ur NEGATIVE NEGATIVE    Bilirubin Urine NEGATIVE NEGATIVE    Ketones, Urine NEGATIVE NEGATIVE    Specific Gravity, UA 1.047 (H) 1.005 - 1.030    Urine Hgb NEGATIVE NEGATIVE    pH, UA 6.5 5.0 - 8.0    Protein, UA NEGATIVE NEGATIVE    Urobilinogen, Urine Normal Normal    Nitrite, Urine NEGATIVE NEGATIVE    Leukocyte Esterase, Urine TRACE (A) NEGATIVE    -          WBC, UA 10 TO 20 0 - 5 /HPF    RBC, UA 2 TO 5 0 - 4 /HPF    Casts UA  0 - 8 /LPF     0 TO 2 HYALINE Reference range defined for non-centrifuged specimen. Crystals, UA NOT REPORTED None /HPF    Epithelial Cells UA 2 TO 5 0 - 5 /HPF    Renal Epithelial, UA NOT REPORTED 0 /HPF    Bacteria, UA NOT REPORTED None    Mucus, UA NOT REPORTED None    Trichomonas, UA NOT REPORTED None    Amorphous, UA NOT REPORTED None    Other Observations UA NOT REPORTED NOT REQ.     Yeast, UA NOT REPORTED None   APTT   Result Value Ref Range    PTT 24.0 20.5 - 30.5 sec   CEA   Result Value Ref Range    CEA 3.0 <3.9 ng/mL   CANCER ANTIGEN 19-9   Result Value Ref Range    CA 19-9 <1 0 - 35 U/mL   CBC   Result Value Ref Range    WBC 4.4 3.5 - 11.3 k/uL    RBC 3.08 (L) 3.95 - 5.11 m/uL    Hemoglobin 9.9 (L) 11.9 - 15.1 g/dL    Hematocrit 30.9 (L) 36.3 - 47.1 %    .3 82.6 - 102.9 fL    MCH 32.1 25.2 - 33.5 pg    MCHC 32.0 28.4 - 34.8 g/dL    RDW 13.9 11.8 - 14.4 %    Platelets 277 (H) 146 - 453 k/uL    MPV 9.1 8.1 - 13.5 fL    NRBC Automated 0.0 0.0 per 100 WBC   Renal Function Panel   Result Value Ref Range    Glucose 96 70 - 99 mg/dL    BUN 15 8 - 23 mg/dL    CREATININE 1.12 (H) 0.50 - 0.90 mg/dL    Bun/Cre Ratio NOT REPORTED 9 - 20    Calcium 7.8 (L) 8.6 - 10.4 mg/dL    Albumin 3.2 (L) 3.5 - 5.2 g/dL    Phosphorus 2.4 (L) 2.6 - 4.5 mg/dL    Sodium 142 135 - 144 mmol/L    Potassium 4.0 3.7 - 5.3 mmol/L    Chloride 115 (H) 98 - 107 mmol/L    CO2 19 (L) 20 - 31 mmol/L    Anion Gap 8 (L) 9 - 17 mmol/L    GFR Non-African American 48 (L) >60 mL/min    GFR  59 (L) >60 mL/min    GFR Comment          GFR Staging NOT REPORTED    Magnesium   Result Value Ref Range    Magnesium 1.2 (L) 1.6 - 2.6 mg/dL   AFP Tumor Marker   Result Value Ref Range    AFP (Alpha Fetoprotein) 3.0 <8.4 ug/L   POC Glucose Fingerstick   Result Value Ref Range    POC Glucose 37 (LL) 65 - 105 mg/dL   POC Glucose Fingerstick   Result Value Ref Range    POC Glucose 214 (H) 65 - 105 mg/dL   POC Glucose Fingerstick   Result Value Ref Range    POC Glucose 171 (H) 65 - 105 mg/dL   POC Glucose Fingerstick   Result Value Ref Range    POC Glucose 113 (H) 65 - 105 mg/dL   POC Glucose Fingerstick   Result Value Ref Range    POC Glucose 127 (H) 65 - 105 mg/dL   POC Glucose Fingerstick   Result Value Ref Range    POC Glucose 132 (H) 65 - 105 mg/dL   POC Glucose Fingerstick   Result Value Ref Range    POC Glucose 102 65 - 105 mg/dL   POC Glucose Fingerstick   Result Value Ref Range    POC Glucose 166 (H) 65 - 105 mg/dL   POC Glucose Fingerstick   Result Value Ref Range    POC Glucose 136 (H) 65 - 105 mg/dL   EKG 12 Lead   Result Value Ref Range    Ventricular Rate 97 BPM    Atrial Rate 97 BPM    P-R Interval 144 ms    QRS Duration 68 ms    Q-T Interval 386 ms    QTc Calculation (Bazett) 490 ms    P Axis 99 degrees    R Axis 68 degrees    T Axis 72 degrees   EKG 12 Lead   Result Value Ref Range Ventricular Rate 81 BPM    Atrial Rate 81 BPM    P-R Interval 176 ms    QRS Duration 82 ms    Q-T Interval 344 ms    QTc Calculation (Bazett) 399 ms    P Axis 75 degrees    R Axis 56 degrees    T Axis -32 degrees         IMAGING DATA:    Us Gi Endoscopic S&i    Result Date: 4/5/2021  Radiology exam is complete. No Radiologist dictation. Please follow up with ordering provider. Ct Abdomen Pelvis W Iv Contrast Additional Contrast? None    Result Date: 4/21/2021  EXAMINATION: CT OF THE ABDOMEN AND PELVIS WITH CONTRAST 4/21/2021 12:58 pm TECHNIQUE: CT of the abdomen and pelvis was performed with the administration of intravenous contrast. Multiplanar reformatted images are provided for review. Dose modulation, iterative reconstruction, and/or weight based adjustment of the mA/kV was utilized to reduce the radiation dose to as low as reasonably achievable. COMPARISON: CT abdomen and pelvis performed 04/03/2021. HISTORY: ORDERING SYSTEM PROVIDED HISTORY: abdominal pain, recent FNA of pancreatic mass, on Anticoagulation TECHNOLOGIST PROVIDED HISTORY: abdominal pain, recent FNA of pancreatic mass, on Anticoagulation Decision Support Exception->Emergency Medical Condition ([place]) Reason for Exam: abdominal pain, recent FNA of pancreatic mass, on Anticoagulation Acuity: Unknown Type of Exam: Unknown FINDINGS: Lower Chest: The lung bases are without consolidation or effusion. Visualized abdominal structures are unremarkable. Organs: The liver and spleen are normal size and overall attenuation. The gallbladder and adrenal glands are unremarkable. There is redemonstration of a peripherally enhancing mass in the pancreatic body that is not significantly changed compared to prior exam.  The kidneys are without obstructive uropathy. The ureters are not dilated. The urinary bladder is unremarkable. GI/Bowel:  The stomach is fluid-filled and otherwise unremarkable loops of small bowel are normal in caliber without evidence for obstruction. The colon contains air and fecal residue and is otherwise unremarkable. There is no intraperitoneal free air or free fluid. Jasper Charli Pelvis: Uterus appears age-appropriate. Peritoneum/Retroperitoneum: The psoas muscles are symmetric. The abdominal aorta is normal in caliber. The inferior vena cava is unremarkable. There is no retroperitoneal or mesenteric adenopathy. Bones/Soft Tissues: The extra-abdominal soft tissues are unremarkable. There is no acute osseous abnormality. No acute abdominal or pelvic abnormality. Redemonstration of a peripherally enhancing mass in the pancreatic body that is not significantly changed compared to prior examination. Ct Abdomen Pelvis W Iv Contrast Additional Contrast? None    Result Date: 4/3/2021  EXAMINATION: CT OF THE ABDOMEN AND PELVIS WITH CONTRAST 4/3/2021 10:29 pm TECHNIQUE: CT of the abdomen and pelvis was performed with the administration of intravenous contrast. Multiplanar reformatted images are provided for review. Dose modulation, iterative reconstruction, and/or weight based adjustment of the mA/kV was utilized to reduce the radiation dose to as low as reasonably achievable. COMPARISON: 10/20/2020 HISTORY: ORDERING SYSTEM PROVIDED HISTORY: right upper quadrant pain TECHNOLOGIST PROVIDED HISTORY: right upper quadrant pain Decision Support Exception->Emergency Medical Condition ([place]) Reason for Exam: right upper quadrant pain Acuity: Acute Type of Exam: Initial FINDINGS: Lower Chest: The visualized part of lung bases are clear. Organs: 2.3 x 1.9 cm peripherally enhancing lesion appears to arise from the mid body of the pancreas. The peripheral aspect of this lesion has a more solid appearance in comparison to prior exam.  There is also associated mild dilatation of pancreatic duct. 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   There is unchanged focal area of fatty infiltration involving the anterior aspect of the liver near ligamentum teres. The liver, spleen, adrenal glands, gallbladder,  kidneys and ureters and pelvic organs including the urinary bladder appear unremarkable. GI/Bowel:Apparent hyperenhancement of the mucosa of the stomach. There has been interval mild thickening of the wall of the ascending colon which was not present on prior examination. The finding may be suggestive of infectious/inflammatory colitis. Peritoneum/Retroperitoneum: No free air or free fluid is noted. No pathologically enlarged lymphadenopathy. The vasculature do not demonstrate acute abnormality. Bones/Soft Tissues: Mild levoscoliosis with tip at L3-L4. Severe degenerative changes of left hip joint. status post right hip arthroplasty. Mild disc degenerative disease at L4-L5. Thickening of the wall of the ascending colon with mild surrounding fatty stranding is likely suggestive of infectious/inflammatory colitis. Apparent hyperenhancement of the mucosa of the stomach. Finding is nonspecific but may be suggestive of gastritis. 2.3 x 1.9 cm peripherally enhancing lesion appears to arise from the mid body of the pancreas. The peripheral aspect of this lesion has a more solid appearance in comparison to prior exam. There is also associated mild dilatation of pancreatic duct. 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 For further evaluation of this finding contrast enhance MRCP examination is recommended. Severe degenerative changes of the left hip joint with bone-on-bone appearance. Xr Chest Portable    Result Date: 3/29/2021  EXAMINATION: ONE XRAY VIEW OF THE CHEST 3/29/2021 9:30 am COMPARISON: Chest radiograph performed 10/20/2020. HISTORY: ORDERING SYSTEM PROVIDED HISTORY: chest pain sob TECHNOLOGIST PROVIDED HISTORY: chest pain sob Reason for Exam: short of breath FINDINGS: There is no acute consolidation or effusion. There is no pneumothorax. The mediastinal structures are unremarkable.   The upper abdomen is unremarkable. The extrathoracic soft tissues are unremarkable. There is no acute osseous abnormality. No acute cardiopulmonary process. Fluoro For Surgical Procedures    Result Date: 3/25/2021  Radiology exam is complete. No Radiologist dictation. Please follow up with ordering provider. Xr Abdomen (2 Views)    Result Date: 3/29/2021  EXAMINATION: TWO XRAY VIEWS OF THE ABDOMEN 3/29/2021 10:12 am COMPARISON: July 19, 2020; same date chest radiograph HISTORY: ORDERING SYSTEM PROVIDED HISTORY: upper abdominal pain TECHNOLOGIST PROVIDED HISTORY: upper abdominal pain Reason for Exam: upper abdominal pain FINDINGS: No intraperitoneal free air. Nonspecific but nonobstructive bowel gas pattern. No abnormal calcifications overlying the gallbladder urinary tract. No mass effect. Partially visualized right total hip arthroplasty appears intact. There is severe/advanced left hip osteoarthrosis with evidence for AVN to include flattening/deformity of the left femoral head. Nonspecific but nonobstructive bowel gas pattern. Indication:   Pancreatic body lesion     Postprocedure diagnosis: Pancreatic body lesion-fine-needle biopsy performed     Anesthesia:  MAC     Complications: None     Specimen: Sent to the lab     Description of Procedure:  Informed consent was obtained from the patient after explanation of the procedure including indications, description of the procedure,  benefits and possible risks and complications of the procedure, and alternatives. Questions were answered. The patient's history was reviewed and a directed physical examination was performed prior to the procedure. Patient was monitored throughout the procedure with pulse oximetry and periodic assessment of vital signs. Patient was sedated as noted above.  With the patient in the left lateral decubitus position, the Olympus videoendoscope followed by endoechoendoscope was placed in the patient's mouth and under direct visualization passed into the esophagus. The scope was passed to the 2nd portion of the duodenum. The patient tolerated the procedure well and was taken to the recovery area in good condition. Estimated blood loss was none. EGD Findings[de-identified]   Esophagus: normal.   Stomach: Stomach had small sliding hiatal hernia. Stomach was otherwise normal.  Duodenum: normal     EUS findings:  Pancreas: There was a lesion near the pancreatic body measuring 23 mm x 18 mm. This had well-defined borders. There was a small calcifications within the lesion. This was solid-appearing. This was hypoechoic. Fine-needle biopsy of this lesion was performed via transgastric route with 22-gauge fine-needle biopsy needle. 4 passes were made. Preliminary cytology showed adequate cellularity. The rest of the pancreas otherwise appeared normal. Normal PD. No signs of chronic pancreatitis. CBD: Normal, no stones, sludge. CBD diameter: 5 mm. Left adrenal: Normal.   Ampulla: Normal.  Left lobe of liver: normal.   Lymphadenopathy: No pathologic lymphadenopathy seen in upper abdomen. IMPRESSION:   Primary Problem  CRYSTAL (acute kidney injury) [facility])    Active Hospital Problems    Diagnosis Date Noted    Noncompliance [Z91.19] 04/23/2021    Crack cocaine use [F14.90] 04/23/2021    Gastrointestinal stromal tumor (GIST) ([facility] [C49. A0]     Hypoglycemia [E16.2]     History of pulmonary embolism [Z86.711]     Right-sided chest wall pain [R07.89] 04/21/2021    CRYSTAL (acute kidney injury) ([facility] [N17.9] 04/21/2021    H/O malignant gastrointestinal stromal tumor (GIST) [Z85.09] 04/21/2021    Homeless [Z59.0] 04/04/2021    Pancreatic mass [K86.89] 04/04/2021    Multiple subsegmental pulmonary emboli without acute cor pulmonale (HCC) [I26.94]     Avascular necrosis of left femur ([address] [M87.052] 06/15/2020    Protein calorie malnutrition ([address] [E46] 09/22/2019    History of hepatitis C [Z86.19] 08/11/2019    COPD without exacerbation ([facility] [J44.9] 08/11/2019    Hypokalemia [E87.6] 08/11/2019    Avascular necrosis of bone of right hip ([facility] [M87.051] 08/10/2019    Polysubstance abuse ([facility] [F19.10]     Pancreas head lesion [S36.200A] 01/10/2019    Psychosis, intermittent requring antipsychotic meds [F29] 95/00/5656    Alcoholic cirrhosis of liver without ascites ([facility] [K70.30]     Chronic heart failure with reduced ejection fraction and diastolic dysfunction ([facility] [I50.42] 10/23/2018    Essential hypertension [I10] 10/11/2018    History of substance abuse ([facility] [F19.11] 10/11/2018         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 I personally reviewed results of lab work-up imaging studies and other relevant clinical data. 2. Discussed with Dr. Felice Link from GI  3. Discussed with Dr. Marcello Miller from palliative care  4. Patient is now stating that she wants treatment for the GIST tumor. 5. Review of chart shows the tumor has been stable for multiple years suggestive of slow-growing nature of the disease  6. 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 7. Patient now expresses wishes to proceed with treatment of the tumor. Patient is having recurrent pain possibly due to the tumor  8. Given the stable nature of the disease this can be done as an outpatient as long as patient is able to follow-up  9. If patient proceeds with surgery will decide regarding adjuvant imatinib based on risk of recurrence however compliance will be an issue. 10. Patient has been off anticoagulation now started back on heparin.   Okay to transition to oral anticoagulant from hematology oncology standpoint however compliance again is going to be an issue  11. Patient will need evaluation and treatment of hep C  12. Continue symptomatic supportive care      Discussed with family and Nurse. Thank you for asking us to see this patient. Toney Nixon MD          This note is created with the assistance of a speech recognition program.  While intending to generate a document that actually reflects the content of the visit, the document can still have some errors including those of syntax and sound a like substitutions which may escape proof reading. It such instances, actual meaning can be extrapolated by contextual diversion.

## 2021-04-24 NOTE — CONSULTS
history of polysubstance use, but states she has not used any illicit substances or EtOH in many years. No recent urine toxicology or MONI to review. States she has no desire to use alcohol or illicit substances. Denies need for AOD treatment services. Patient feels that her psychotropic medications work well, and requests for medications to be left as they are. Patient denies perceptual disturbances or psychotic phenomena. She is organized in her thought process, with some delayed responses and irritable tone with continued questions. Attempted to perform SLUMS assessment, but patient was not cooperative. She is oriented to self, time, place and situation. Did have difficulty with immediate and delayed recall. Spoke with treatment team and reviewed progress notes.  is attempting to transition patient to [facility]. PSYCHIATRIC HISTORY:      · Outpatient psychiatric provider: Dr. Tyrell Ponce with [facility]  · Suicide attempts: Denies  · Inpatient psychiatric admissions: Denies    Past psychiatric medications includes:   Mirtazapine and amitriptyline    Adverse reactions from psychotropic medications:  Denies      Lifetime Psychiatric Review of Systems      ·    Obsessions and Compulsions: Denies    ·    Galina or Hypomania: Denies  ·    Hallucinations: Denies  ·    Panic Attacks:  Denies  ·    Delusions:  Denies  ·    Phobias:  Denies  ·    Trauma: Denies    Prior to Admission medications    Medication Sig Start Date End Date Taking?  Authorizing Provider   famotidine (PEPCID) 20 MG tablet Take 1 tablet by mouth 2 times daily 4/6/21   Brittany Acevedo, DO   mometasone-formoterol [facility]) 100-5 MCG/ACT inhaler Inhale 2 puffs into the lungs 2 times daily 4/6/21   Brittany Acevedo,    mirtazapine (REMERON) 30 MG tablet Take 1 tablet by mouth nightly 4/6/21   Brittany Acevedo, DO   lisinopril (PRINIVIL;ZESTRIL) 10 MG tablet Take 1 tablet by mouth daily 4/6/21   Brittany Acevedo, DO   gabapentin (NEURONTIN) 300 MG capsule Take 1 capsule by mouth 3 times daily for 30 days. 4/6/21 5/6/21  Carrillo Milo, DO   ferrous sulfate (MELISSA-HAO) 325 (65 Fe) MG tablet Take 1 tablet by mouth daily 4/6/21   Carrillo Milo, DO   clopidogrel (PLAVIX) 75 MG tablet Take 1 tablet by mouth daily 4/6/21   Carrillo [place], DO   carvedilol (COREG) 6.25 MG tablet Take 1 tablet by mouth 2 times daily (with meals) 4/6/21   [facility], DO   atorvastatin (LIPITOR) 40 MG tablet Take 1 tablet by mouth nightly 4/6/21   Carrillo Milo, DO   apixaban (ELIQUIS) 5 MG TABS tablet Take 1 tablet by mouth 2 times daily 4/6/21   Carrillo Milo, DO   amitriptyline (ELAVIL) 10 MG tablet Take 1 tablet by mouth nightly 4/6/21   Carrillo Milo, DO   albuterol sulfate HFA (PROVENTIL HFA) 108 (90 Base) MCG/ACT inhaler Inhale 1-2 puffs into the lungs once for 1 dose 4/6/21 4/6/21  Carrillo Milo, DO   butalbital-acetaminophen-caffeine (FIORICET, ESGIC) -63 MG per tablet Take 1 tablet by mouth every 12 hours for 5 doses 3/28/21 3/31/21  Jalen Pal MD   Elastic Bandages & Supports (LUMBAR BACK BRACE/SUPPORT PAD) MISC 1 Units by Does not apply route daily 2/10/21 2/10/22  Prerna Garcia MD   amLODIPine (NORVASC) 10 MG tablet Take 1 tablet by mouth daily 2/9/21 2/9/21  Sanjuana Maya MD   Sennosides (SENNA) 8.6 MG CAPS 8.6 capsules 8/2/20   Historical MD Josefa Dye.  Devices [facility]) MISC 1 Device by Does not apply route once for 1 dose 8/5/19 8/5/19  Mackenzie Santos PA-C   ipratropium-albuterol (DUONEB) 0.5-2.5 (3) MG/3ML SOLN nebulizer solution Inhale 3 mLs into the lungs every 4 hours as needed for Shortness of Breath 1/11/19   Chau Saldivar MD   folic acid (FOLVITE) 1 MG tablet Take 1 tablet by mouth daily 10/13/18   Yessenia Dunne MD   vitamin B-1 100 MG tablet Take 1 tablet by mouth daily 10/13/18   Yessenia Dunne MD   vitamin B-12 250 MCG tablet Take 1 tablet by mouth daily 10/13/18   Yessenia Dunne MD   Multiple Vitamins-Minerals (THERAPEUTIC MULTIVITAMIN-MINERALS) tablet Take 1 tablet by mouth daily 8/12/18   Vonne Harada, MD        Medications:    Current Facility-Administered Medications: butalbital-acetaminophen-caffeine (FIORICET, ESGIC) per tablet 1 tablet, 1 tablet, Oral, Q6H PRN  lidocaine 1 % injection 5 mL, 5 mL, Intradermal, Once  sodium chloride flush 0.9 % injection 5-40 mL, 5-40 mL, Intravenous, 2 times per day  sodium chloride flush 0.9 % injection 5-40 mL, 5-40 mL, Intravenous, PRN  0.9 % sodium chloride infusion, 25 mL, Intravenous, PRN  HYDROcodone-acetaminophen (NORCO) 5-325 MG per tablet 1 tablet, 1 tablet, Oral, Q4H PRN  amitriptyline (ELAVIL) tablet 10 mg, 10 mg, Oral, Nightly  atorvastatin (LIPITOR) tablet 40 mg, 40 mg, Oral, Nightly  carvedilol (COREG) tablet 6.25 mg, 6.25 mg, Oral, BID WC  clopidogrel (PLAVIX) tablet 75 mg, 75 mg, Oral, Daily  famotidine (PEPCID) tablet 20 mg, 20 mg, Oral, BID  ferrous sulfate (FE TABS 325) EC tablet 325 mg, 325 mg, Oral, Daily  folic acid (FOLVITE) tablet 1 mg, 1 mg, Oral, Daily  gabapentin (NEURONTIN) capsule 300 mg, 300 mg, Oral, TID  ipratropium-albuterol (DUONEB) nebulizer solution 3 mL, 1 vial, Inhalation, Q4H PRN  mirtazapine (REMERON) tablet 30 mg, 30 mg, Oral, Nightly  budesonide-formoterol (SYMBICORT) 160-4.5 MCG/ACT inhaler 2 puff, 2 puff, Inhalation, BID  therapeutic multivitamin-minerals 1 tablet, 1 tablet, Oral, Daily  sodium chloride flush 0.9 % injection 5-40 mL, 5-40 mL, Intravenous, 2 times per day  sodium chloride flush 0.9 % injection 10 mL, 10 mL, Intravenous, PRN  0.9 % sodium chloride infusion, 25 mL, Intravenous, PRN  potassium chloride (KLOR-CON M) extended release tablet 40 mEq, 40 mEq, Oral, PRN **OR** potassium bicarb-citric acid (EFFER-K) effervescent tablet 40 mEq, 40 mEq, Oral, PRN **OR** potassium chloride 10 mEq/100 mL IVPB (Peripheral Line), 10 mEq, Intravenous, PRN  magnesium sulfate 1000 mg in dextrose 5% 100 mL IVPB, 1,000 mg, Intravenous, PRN  promethazine SECTION      GASTRIC BYPASS SURGERY      HIP SURGERY Right 08/13/2019    HIP TOTAL ARTHROPLASTY ANTERIOR APPROACH - [place],  C-ARM, (Right )    LAPAROSCOPIC APPENDECTOMY N/A 3/27/2017    APPENDECTOMY LAPAROSCOPIC performed by Gwendolyn Saenz MD at [facility]  2002    hgsil    MEDICATION INJECTION Left 3/25/2021    STEROID INJECTION LEFT HIP  WITH FLUORO performed by Te Tineo MD at [id number] [facility] N/A 11/10/2018    COLONOSCOPY WITH BIOPSY performed by Lisa Haile MD at [facility]    [place] EGD TRANSORAL BIOPSY SINGLE/MULTIPLE N/A 11/9/2018    EGD ESOPHAGOGASTRODUODENOSCOPY performed by Emilio Peck MD at [address] Right 8/13/2019    HIP TOTAL ARTHROPLASTY ANTERIOR APPROACH - [place],  C-ARM, performed by Kristen Patel DO at [facility]  2008    oesophagitis, candidiasis    UPPER GASTROINTESTINAL ENDOSCOPY  11/05/2018    UPPER GASTROINTESTINAL ENDOSCOPY N/A 11/5/2018    EGD CONTROL HEMORRHAGE performed by Emilio Peck MD at [facility] N/A 4/5/2021    EGD W/EUS FNA performed by Donita Melvin MD at [facility]       Allergies: Aspirin and Keflet [cephalexin]      Social History:      RESIDENCE: Homeless, was living at [facility] [place] she does not wish to return there. Care coordination looking for ECF placement. : Denies   CHILDREN: States she has 1 adult son in the [facility] area she is estranged from  [address] [facility]: Not employed  EDUCATION: 11th grade, did not graduate    SUBSTANCE USE HISTORY:   Reports significant polysubstance use history. Denies use of any illicit substances in many years. No recent urine toxicology to review. Most recent U tox from 9/24/2020. Negative for all. No MONI to review.         Family Medical and Psychiatric History:   Denies pertinent psychiatric family history        Problem Relation Age of Onset   

## 2021-04-24 NOTE — PROGRESS NOTES
Today's Date: 4/24/2021  Patient Name: Chaim Hutton  Date of admission: 4/21/2021 11:00 AM  Patient's age: 74 y.o., 1955  Admission Dx: Right-sided chest wall pain [R07.89]  Acute kidney injury ([address] [N17.9]    Reason for Consult: management recommendations  Requesting Physician: Anthony Church DO    CHIEF COMPLAINT: Abdominal pain. Nausea vomiting. Weight loss. History Obtained From:  patient, electronic medical record    Interval history:    Patient seen and examined  Labs and vitals reviewed  Patient still complains of abdominal pain  Interested in surgery   No vomiting     HISTORY OF PRESENT ILLNESS:      The patient is a 74 y.o.  female who presents with a emergency department with chief complaint of chest pain. Patient was recently diagnosed with just tumor on endoscopy performed on 4/5/2021. Patient was recently hospitalized with complaints of nausea and vomiting. Underwent CT scan which showed a 2.3 cm lesion arising from the mid pancreatic body. Patient underwent EGD with EUS EGD showed a small hiatal hernia otherwise was unremarkable. EUS showed lesion in the pancreatic body 2.3 x 1.8 mm well-defined borders. 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 Pathology report does not mention proliferative index. Patient again had a CT scan done in the ER which showed mildly distended stomach and duodenum. Did not show any obstruction. Patient does complain of weight loss over the last couple of months and recently had difficulties eating due to complaining of nausea. The tumor in the pancreas does not seem to cause any obstruction. Patient has been evaluated by surgery who are recommending low residue diet.   Patient's BMI is 19      Past Medical History:   has a past medical history of Appendicitis, Cerebral artery occlusion with cerebral infarction [facility]), CHF (congestive heart failure) ([address] Chronic respiratory failure with hypoxia and hypercapnia ([address] Chronic tablet by mouth daily 4/6/21   [facility], DO   carvedilol (COREG) 6.25 MG tablet Take 1 tablet by mouth 2 times daily (with meals) 4/6/21   [facility], DO   atorvastatin (LIPITOR) 40 MG tablet Take 1 tablet by mouth nightly 4/6/21   [facility], DO   apixaban (ELIQUIS) 5 MG TABS tablet Take 1 tablet by mouth 2 times daily 4/6/21   [facility], DO   amitriptyline (ELAVIL) 10 MG tablet Take 1 tablet by mouth nightly 4/6/21   [facility], DO   albuterol sulfate HFA (PROVENTIL HFA) 108 (90 Base) MCG/ACT inhaler Inhale 1-2 puffs into the lungs once for 1 dose 4/6/21 4/6/21  [facility], DO   butalbital-acetaminophen-caffeine (FIORICET, ESGIC) -58 MG per tablet Take 1 tablet by mouth every 12 hours for 5 doses 3/28/21 3/31/21  Kirsty Monzon MD   Elastic Bandages & Supports (LUMBAR BACK BRACE/SUPPORT PAD) MISC 1 Units by Does not apply route daily 2/10/21 2/10/22  Ese Marroquin MD   amLODIPine (NORVASC) 10 MG tablet Take 1 tablet by mouth daily 2/9/21 2/9/21  Kael Rogers MD   Sennosides (SENNA) 8.6 MG CAPS 8.6 capsules 8/2/20   MD Josefa Lucas.  Devices [facility]) MISC 1 Device by Does not apply route once for 1 dose 8/5/19 8/5/19  Asif Marie PA-C   ipratropium-albuterol (DUONEB) 0.5-2.5 (3) MG/3ML SOLN nebulizer solution Inhale 3 mLs into the lungs every 4 hours as needed for Shortness of Breath 1/11/19   Moshe Chase MD   folic acid (FOLVITE) 1 MG tablet Take 1 tablet by mouth daily 10/13/18   Tiffany Hopkins MD   vitamin B-1 100 MG tablet Take 1 tablet by mouth daily 10/13/18   Tiffany Hopkins MD   vitamin B-12 250 MCG tablet Take 1 tablet by mouth daily 10/13/18   Tiffany Hopkins MD   Multiple Vitamins-Minerals (THERAPEUTIC MULTIVITAMIN-MINERALS) tablet Take 1 tablet by mouth daily 8/12/18   Alan Patton MD     Current Facility-Administered Medications   Medication Dose Route Frequency Provider Last Rate Last Admin    butalbital-acetaminophen-caffeine (FIORICET, ESGIC) per tablet  1 tablet Oral Daily Carolyn Villavicencio APRN - NP   1 tablet at 04/24/21 0951    sodium chloride flush 0.9 % injection 5-40 mL  5-40 mL Intravenous 2 times per day Carolyn Villavicencio, APRN - NP   5 mL at 04/24/21 0991    sodium chloride flush 0.9 % injection 10 mL  10 mL Intravenous PRN Carolyn Villavicencio, APRN - NP        0.9 % sodium chloride infusion  25 mL Intravenous PRN Carolyn Villavicencio, APRN - NP        potassium chloride (KLOR-CON M) extended release tablet 40 mEq  40 mEq Oral PRN Carolyn Villavicencio, APRN - NP        Or   [employer] potassium bicarb-citric acid (EFFER-K) effervescent tablet 40 mEq  40 mEq Oral PRN Carolyn Villavicencio, APRN - NP        Or   [employer] potassium chloride 10 mEq/100 mL IVPB (Peripheral Line)  10 mEq Intravenous PRN Carolyn Villavicencio, APRN - NP        magnesium sulfate 1000 mg in dextrose 5% 100 mL IVPB  1,000 mg Intravenous PRN Carolyn Villavicencio, APRN - NP        promethazine (PHENERGAN) tablet 12.5 mg  12.5 mg Oral Q6H PRN Carolyn Villavicencio, APRN - NP        Or    on[facility]etron [facility]) injection 4 mg  4 mg Intravenous Q6H PRN Carolyn Villavicencio, APRN - NP   4 mg at 04/23/21 2042    polyethylene glycol (GLYCOLAX) packet 17 g  17 g Oral Daily PRN Carolyn Villavicencio, APRN - NP        acetaminophen (TYLENOL) tablet 650 mg  650 mg Oral Q6H PRN Carolyn Villavicencio, APRN - NP        Or   [employer] acetaminophen (TYLENOL) suppository 650 mg  650 mg Rectal Q6H PRN Carolyn Villavicencio, APRN - NP        hydrOXYzine (ATARAX) tablet 25 mg  25 mg Oral TID PRN Carolyn Villavicencio, APRN - NP   25 mg at 04/22/21 2103    glucose (GLUTOSE) 40 % oral gel 15 g  15 g Oral PRN Carolyn Villavicencio, APRN - NP        dextrose 50 % IV solution  12.5 g Intravenous PRN Carolyn Villavicencio, APRN - NP        glucagon (rDNA) injection 1 mg  1 mg Intramuscular PRN aCrolyn Villavicencio, APRN - NP        dextrose 5 % solution  100 mL/hr Intravenous PRN JAYDON Lara - NP        0.9 % sodium chloride infusion   Intravenous Continuous Catha Anthony,  mL/hr at 04/22/21 2432 New Bag at 04/22/21 2103    heparin (porcine) injection 3,430 Units  80 Units/kg Intravenous PRN Ginger Ríos DO        heparin (porcine) injection 1,720 Units  40 Units/kg Intravenous PRN Ginger Ríos DO        heparin 25,000 units in dextrose 5% 250 mL (premix) infusion  5-30 Units/kg/hr Intravenous Continuous Ginger Ríos DO   Stopped at 04/22/21 0050       Allergies:  Aspirin and Keflet [cephalexin]    Social History:   reports that she quit smoking about 2 years ago. Her smoking use included cigarettes. She started smoking about 51 years ago. She has a 40.00 pack-year smoking history. She has never used smokeless tobacco. She reports that she does not drink alcohol or use drugs. Family History: family history includes Breast Cancer in her mother; Heart Disease in her father. REVIEW OF SYSTEMS:      Constitutional: No fever or chills. No night sweats, positive weight loss. Eyes: No eye discharge, double vision, or eye pain   HEENT: negative for sore mouth, sore throat, hoarseness and voice change   Respiratory: negative for cough , sputum, dyspnea, wheezing, hemoptysis, chest pain   Cardiovascular: negative for chest pain, dyspnea, palpitations, orthopnea, PND   Gastrointestinal: Positive abdominal pain  Genitourinary: negative for frequency, dysuria, nocturia, urinary incontinence, and hematuria   Integument: negative for rash, skin lesions, bruises. Hematologic/Lymphatic: negative for easy bruising, bleeding, lymphadenopathy, or petechiae   Endocrine: negative for heat or cold intolerance,weight changes, change in bowel habits and hair loss   Musculoskeletal: negative for myalgias, arthralgias, pain, joint swelling,and bone pain   Neurological: negative for headaches, dizziness, seizures, weakness, numbness. Positive for memory.     PHYSICAL EXAM:        BP (!) 153/72   Pulse 85   Temp 97.8 °F (36.6 °C) (Oral)   Resp 16   Ht 4' 11\" (1.499 m)   Wt 92 lb 8 oz (42 kg)   LMP 03/18/2015   SpO2 97% BMI 18.68 kg/m²    Temp (24hrs), Av.4 °F (36.9 °C), Min:97.8 °F (36.6 °C), Max:99 °F (37.2 °C)      General appearance -frail appearing, no in pain or distress   Mental status - alert and cooperative   Eyes - pupils equal and reactive, extraocular eye movements intact   Ears - bilateral TM's and external ear canals normal   Mouth - mucous membranes moist, pharynx normal without lesions   Neck - supple, no significant adenopathy   Lymphatics - no palpable lymphadenopathy, no hepatosplenomegaly   Chest - clear to auscultation, no wheezes, rales or rhonchi, symmetric air entry   Heart - normal rate, regular rhythm, normal S1, S2, no murmurs  Abdomen -soft. Nondistended.   Positive tender  Neurological - alert, oriented, normal speech, no focal findings or movement disorder noted   Musculoskeletal - no joint tenderness, deformity or swelling   Extremities - peripheral pulses normal, no pedal edema, no clubbing or cyanosis   Skin - normal coloration and turgor, no rashes, no suspicious skin lesions noted ,      DATA:      Labs:     Results for orders placed or performed during the hospital encounter of 21   CBC   Result Value Ref Range    WBC 5.1 3.5 - 11.3 k/uL    RBC 4.24 3.95 - 5.11 m/uL    Hemoglobin 13.7 11.9 - 15.1 g/dL    Hematocrit 42.2 36.3 - 47.1 %    MCV 99.5 82.6 - 102.9 fL    MCH 32.3 25.2 - 33.5 pg    MCHC 32.5 28.4 - 34.8 g/dL    RDW 13.3 11.8 - 14.4 %    Platelets 568 (H) 274 - 453 k/uL    MPV 9.0 8.1 - 13.5 fL    NRBC Automated 0.0 0.0 per 100 WBC   COMPREHENSIVE METABOLIC PANEL   Result Value Ref Range    Glucose 46 (L) 70 - 99 mg/dL    BUN 23 8 - 23 mg/dL    CREATININE 1.46 (H) 0.50 - 0.90 mg/dL    Bun/Cre Ratio NOT REPORTED 9 - 20    Calcium 9.3 8.6 - 10.4 mg/dL    Sodium 139 135 - 144 mmol/L    Potassium 3.6 (L) 3.7 - 5.3 mmol/L    Chloride 103 98 - 107 mmol/L    CO2 16 (L) 20 - 31 mmol/L    Anion Gap 20 (H) 9 - 17 mmol/L    Alkaline Phosphatase 83 35 - 104 U/L    ALT 20 5 - 33 U/L    AST 39 (H) <32 U/L    Total Bilirubin 0.46 0.3 - 1.2 mg/dL    Total Protein 8.4 (H) 6.4 - 8.3 g/dL    Albumin 4.2 3.5 - 5.2 g/dL    Albumin/Globulin Ratio 1.0 1.0 - 2.5    GFR Non- 36 (L) >60 mL/min    GFR  43 (L) >60 mL/min    GFR Comment          GFR Staging NOT REPORTED    LIPASE   Result Value Ref Range    Lipase 54 13 - 60 U/L   LACTIC ACID, WHOLE BLOOD   Result Value Ref Range    Lactic Acid, Whole Blood 1.8 0.7 - 2.1 mmol/L   Troponin   Result Value Ref Range    Troponin, High Sensitivity 19 (H) 0 - 14 ng/L    Troponin T NOT REPORTED <0.03 ng/mL    Troponin Interp NOT REPORTED    Urinalysis with microscopic   Result Value Ref Range    Color, UA YELLOW YELLOW    Turbidity UA CLEAR CLEAR    Glucose, Ur NEGATIVE NEGATIVE    Bilirubin Urine NEGATIVE NEGATIVE    Ketones, Urine NEGATIVE NEGATIVE    Specific Gravity, UA 1.047 (H) 1.005 - 1.030    Urine Hgb NEGATIVE NEGATIVE    pH, UA 6.5 5.0 - 8.0    Protein, UA NEGATIVE NEGATIVE    Urobilinogen, Urine Normal Normal    Nitrite, Urine NEGATIVE NEGATIVE    Leukocyte Esterase, Urine TRACE (A) NEGATIVE    -          WBC, UA 10 TO 20 0 - 5 /HPF    RBC, UA 2 TO 5 0 - 4 /HPF    Casts UA  0 - 8 /LPF     0 TO 2 HYALINE Reference range defined for non-centrifuged specimen. Crystals, UA NOT REPORTED None /HPF    Epithelial Cells UA 2 TO 5 0 - 5 /HPF    Renal Epithelial, UA NOT REPORTED 0 /HPF    Bacteria, UA NOT REPORTED None    Mucus, UA NOT REPORTED None    Trichomonas, UA NOT REPORTED None    Amorphous, UA NOT REPORTED None    Other Observations UA NOT REPORTED NOT REQ.     Yeast, UA NOT REPORTED None   APTT   Result Value Ref Range    PTT 24.0 20.5 - 30.5 sec   CEA   Result Value Ref Range    CEA 3.0 <3.9 ng/mL   CANCER ANTIGEN 19-9   Result Value Ref Range    CA 19-9 <1 0 - 35 U/mL   CBC   Result Value Ref Range    WBC 4.4 3.5 - 11.3 k/uL    RBC 3.08 (L) 3.95 - 5.11 m/uL    Hemoglobin 9.9 (L) 11.9 - 15.1 g/dL    Hematocrit 30.9 (L) 36.3 - 47.1 %    .3 82.6 - 102.9 fL    MCH 32.1 25.2 - 33.5 pg    MCHC 32.0 28.4 - 34.8 g/dL    RDW 13.9 11.8 - 14.4 %    Platelets 851 (H) 319 - 453 k/uL    MPV 9.1 8.1 - 13.5 fL    NRBC Automated 0.0 0.0 per 100 WBC   Renal Function Panel   Result Value Ref Range    Glucose 96 70 - 99 mg/dL    BUN 15 8 - 23 mg/dL    CREATININE 1.12 (H) 0.50 - 0.90 mg/dL    Bun/Cre Ratio NOT REPORTED 9 - 20    Calcium 7.8 (L) 8.6 - 10.4 mg/dL    Albumin 3.2 (L) 3.5 - 5.2 g/dL    Phosphorus 2.4 (L) 2.6 - 4.5 mg/dL    Sodium 142 135 - 144 mmol/L    Potassium 4.0 3.7 - 5.3 mmol/L    Chloride 115 (H) 98 - 107 mmol/L    CO2 19 (L) 20 - 31 mmol/L    Anion Gap 8 (L) 9 - 17 mmol/L    GFR Non-African American 48 (L) >60 mL/min    GFR  59 (L) >60 mL/min    GFR Comment          GFR Staging NOT REPORTED    Magnesium   Result Value Ref Range    Magnesium 1.2 (L) 1.6 - 2.6 mg/dL   AFP Tumor Marker   Result Value Ref Range    AFP (Alpha Fetoprotein) 3.0 <8.4 ug/L   POC Glucose Fingerstick   Result Value Ref Range    POC Glucose 37 (LL) 65 - 105 mg/dL   POC Glucose Fingerstick   Result Value Ref Range    POC Glucose 214 (H) 65 - 105 mg/dL   POC Glucose Fingerstick   Result Value Ref Range    POC Glucose 171 (H) 65 - 105 mg/dL   POC Glucose Fingerstick   Result Value Ref Range    POC Glucose 113 (H) 65 - 105 mg/dL   POC Glucose Fingerstick   Result Value Ref Range    POC Glucose 127 (H) 65 - 105 mg/dL   POC Glucose Fingerstick   Result Value Ref Range    POC Glucose 132 (H) 65 - 105 mg/dL   POC Glucose Fingerstick   Result Value Ref Range    POC Glucose 102 65 - 105 mg/dL   POC Glucose Fingerstick   Result Value Ref Range    POC Glucose 166 (H) 65 - 105 mg/dL   POC Glucose Fingerstick   Result Value Ref Range    POC Glucose 136 (H) 65 - 105 mg/dL   POC Glucose Fingerstick   Result Value Ref Range    POC Glucose 122 (H) 65 - 105 mg/dL   POC Glucose Fingerstick   Result Value Ref Range    POC Glucose 128 (H) 65 - 105 mg/dL   EKG 12 significantly changed compared to prior exam.  The kidneys are without obstructive uropathy. The ureters are not dilated. The urinary bladder is unremarkable. GI/Bowel: The stomach is fluid-filled and otherwise unremarkable loops of small bowel are normal in caliber without evidence for obstruction. The colon contains air and fecal residue and is otherwise unremarkable. There is no intraperitoneal free air or free fluid. Carmina Jones Pelvis: Uterus appears age-appropriate. Peritoneum/Retroperitoneum: The psoas muscles are symmetric. The abdominal aorta is normal in caliber. The inferior vena cava is unremarkable. There is no retroperitoneal or mesenteric adenopathy. Bones/Soft Tissues: The extra-abdominal soft tissues are unremarkable. There is no acute osseous abnormality. No acute abdominal or pelvic abnormality. Redemonstration of a peripherally enhancing mass in the pancreatic body that is not significantly changed compared to prior examination. Ct Abdomen Pelvis W Iv Contrast Additional Contrast? None    Result Date: 4/3/2021  EXAMINATION: CT OF THE ABDOMEN AND PELVIS WITH CONTRAST 4/3/2021 10:29 pm TECHNIQUE: CT of the abdomen and pelvis was performed with the administration of intravenous contrast. Multiplanar reformatted images are provided for review. Dose modulation, iterative reconstruction, and/or weight based adjustment of the mA/kV was utilized to reduce the radiation dose to as low as reasonably achievable. COMPARISON: 10/20/2020 HISTORY: ORDERING SYSTEM PROVIDED HISTORY: right upper quadrant pain TECHNOLOGIST PROVIDED HISTORY: right upper quadrant pain Decision Support Exception->Emergency Medical Condition (MA) Reason for Exam: right upper quadrant pain Acuity: Acute Type of Exam: Initial FINDINGS: Lower Chest: The visualized part of lung bases are clear. Organs: 2.3 x 1.9 cm peripherally enhancing lesion appears to arise from the mid body of the pancreas.   The peripheral aspect of this lesion has a more solid appearance in comparison to prior exam.  There is also associated mild dilatation of pancreatic duct. 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 There is unchanged focal area of fatty infiltration involving the anterior aspect of the liver near ligamentum teres. The liver, spleen, adrenal glands, gallbladder,  kidneys and ureters and pelvic organs including the urinary bladder appear unremarkable. GI/Bowel:Apparent hyperenhancement of the mucosa of the stomach. There has been interval mild thickening of the wall of the ascending colon which was not present on prior examination. The finding may be suggestive of infectious/inflammatory colitis. Peritoneum/Retroperitoneum: No free air or free fluid is noted. No pathologically enlarged lymphadenopathy. The vasculature do not demonstrate acute abnormality. Bones/Soft Tissues: Mild levoscoliosis with tip at L3-L4. Severe degenerative changes of left hip joint. status post right hip arthroplasty. Mild disc degenerative disease at L4-L5. Thickening of the wall of the ascending colon with mild surrounding fatty stranding is likely suggestive of infectious/inflammatory colitis. Apparent hyperenhancement of the mucosa of the stomach. Finding is nonspecific but may be suggestive of gastritis. 2.3 x 1.9 cm peripherally enhancing lesion appears to arise from the mid body of the pancreas. The peripheral aspect of this lesion has a more solid appearance in comparison to prior exam. There is also associated mild dilatation of pancreatic duct. 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 For further evaluation of this finding contrast enhance MRCP examination is recommended. Severe degenerative changes of the left hip joint with bone-on-bone appearance.      Xr Chest Portable    Result Date: 3/29/2021  EXAMINATION: ONE XRAY VIEW OF THE CHEST 3/29/2021 9:30 am COMPARISON: Chest radiograph performed 10/20/2020. HISTORY: ORDERING SYSTEM PROVIDED HISTORY: chest pain sob TECHNOLOGIST PROVIDED HISTORY: chest pain sob Reason for Exam: short of breath FINDINGS: There is no acute consolidation or effusion. There is no pneumothorax. The mediastinal structures are unremarkable. The upper abdomen is unremarkable. The extrathoracic soft tissues are unremarkable. There is no acute osseous abnormality. No acute cardiopulmonary process. Fluoro For Surgical Procedures    Result Date: 3/25/2021  Radiology exam is complete. No Radiologist dictation. Please follow up with ordering provider. Xr Abdomen (2 Views)    Result Date: 3/29/2021  EXAMINATION: TWO XRAY VIEWS OF THE ABDOMEN 3/29/2021 10:12 am COMPARISON: July 19, 2020; same date chest radiograph HISTORY: ORDERING SYSTEM PROVIDED HISTORY: upper abdominal pain TECHNOLOGIST PROVIDED HISTORY: upper abdominal pain Reason for Exam: upper abdominal pain FINDINGS: No intraperitoneal free air. Nonspecific but nonobstructive bowel gas pattern. No abnormal calcifications overlying the gallbladder urinary tract. No mass effect. Partially visualized right total hip arthroplasty appears intact. There is severe/advanced left hip osteoarthrosis with evidence for AVN to include flattening/deformity of the left femoral head. Nonspecific but nonobstructive bowel gas pattern. Indication:   Pancreatic body lesion     Postprocedure diagnosis: Pancreatic body lesion-fine-needle biopsy performed     Anesthesia:  MAC     Complications: None     Specimen: Sent to the lab     Description of Procedure:  Informed consent was obtained from the patient after explanation of the procedure including indications, description of the procedure,  benefits and possible risks and complications of the procedure, and alternatives. Questions were answered. The patient's history was reviewed and a directed physical examination was performed prior to the procedure.      Patient was monitored throughout the procedure with pulse oximetry and periodic assessment of vital signs. Patient was sedated as noted above. With the patient in the left lateral decubitus position, the Olympus videoendoscope followed by endoechoendoscope was placed in the patient's mouth and under direct visualization passed into the esophagus. The scope was passed to the 2nd portion of the duodenum. The patient tolerated the procedure well and was taken to the recovery area in good condition. Estimated blood loss was none. EGD Findings[de-identified]   Esophagus: normal.   Stomach: Stomach had small sliding hiatal hernia. Stomach was otherwise normal.  Duodenum: normal     EUS findings:  Pancreas: There was a lesion near the pancreatic body measuring 23 mm x 18 mm. This had well-defined borders. There was a small calcifications within the lesion. This was solid-appearing. This was hypoechoic. Fine-needle biopsy of this lesion was performed via transgastric route with 22-gauge fine-needle biopsy needle. 4 passes were made. Preliminary cytology showed adequate cellularity. The rest of the pancreas otherwise appeared normal. Normal PD. No signs of chronic pancreatitis. CBD: Normal, no stones, sludge. CBD diameter: 5 mm. Left adrenal: Normal.   Ampulla: Normal.  Left lobe of liver: normal.   Lymphadenopathy: No pathologic lymphadenopathy seen in upper abdomen. IMPRESSION:   Primary Problem  CRYSTAL (acute kidney injury) [facility])    Active Hospital Problems    Diagnosis Date Noted    Noncompliance [Z91.19] 04/23/2021    Crack cocaine use [F14.90] 04/23/2021    Gastrointestinal stromal tumor (GIST) ([facility] [C49. A0]     Hypoglycemia [E16.2]     History of pulmonary embolism [Z86.711]     Right-sided chest wall pain [R07.89] 04/21/2021    CRYSTAL (acute kidney injury) ([address] [N17.9] 04/21/2021    H/O malignant gastrointestinal stromal tumor (GIST) [Z85.09] 04/21/2021    Homeless [Z59.0] 04/04/2021    Pancreatic mass [K86.89] 04/04/2021    Multiple subsegmental pulmonary emboli without acute cor pulmonale (HCC) [I26.94]     Avascular necrosis of left femur ([address] [M87.052] 06/15/2020    Protein calorie malnutrition ([address] [E46] 09/22/2019    History of hepatitis C [Z86.19] 08/11/2019    COPD without exacerbation ([address] [J44.9] 08/11/2019    Hypokalemia [E87.6] 08/11/2019    Avascular necrosis of bone of right hip ([address] [M87.051] 08/10/2019    Polysubstance abuse ([address] [F19.10]     Pancreas head lesion [S36.200A] 01/10/2019    Psychosis, intermittent requring antipsychotic meds [F29] 98/98/4759    Alcoholic cirrhosis of liver without ascites ([address] [K70.30]     Chronic heart failure with reduced ejection fraction and diastolic dysfunction ([address] [I50.42] 10/23/2018    Essential hypertension [I10] 10/11/2018    History of substance abuse ([address] [F19.11] 10/11/2018         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 I personally reviewed results of lab work-up imaging studies and other relevant clinical data. 2. She has GIST , likely gastric, pt is interested in surgery   3. Will discuss with gen surgery. Likely to need hepatobiliary surgeon for resection  4. Plan placement, we will coordinate care as outpatient, if patient is compliant with outpatient appointments. Manju Pressley MD  Hematologist/Medical [id number] [facility] hematology oncology physicians            This note is created with the assistance of a speech recognition program.  While intending to generate a document that actually reflects the content of the visit, the document can still have some errors including those of syntax and sound a like substitutions which may escape proof reading.   It such instances, actual meaning can be extrapolated by contextual diversion.

## 2021-04-24 NOTE — PROGRESS NOTES
BRONCHOSPASM/BRONCHOCONSTRICTION   [x]  IMPROVE AERATION/BREATH SOUNDS  [x]  ADMINISTER BRONCHODILATOR THERAPY AS APPROPRIATE  [x]  ASSESS BREATH SOUNDS  []  IMPLEMENT AEROSOL/MDI PROTOCOL  [x]  PATIENT EDUCATION AS NEEDED

## 2021-04-25 ENCOUNTER — APPOINTMENT (OUTPATIENT)
Dept: GENERAL RADIOLOGY | Age: 66
DRG: 469 | End: 2021-04-25
Payer: MEDICARE

## 2021-04-25 PROBLEM — J98.11 ATELECTASIS: Status: ACTIVE | Noted: 2021-04-25

## 2021-04-25 LAB
BNP INTERPRETATION: ABNORMAL
GLUCOSE BLD-MCNC: 107 MG/DL (ref 65–105)
GLUCOSE BLD-MCNC: 123 MG/DL (ref 65–105)
GLUCOSE BLD-MCNC: 135 MG/DL (ref 65–105)
GLUCOSE BLD-MCNC: 141 MG/DL (ref 65–105)
HCT VFR BLD CALC: 31.7 % (ref 36.3–47.1)
HEMOGLOBIN: 9.6 G/DL (ref 11.9–15.1)
MCH RBC QN AUTO: 32.3 PG (ref 25.2–33.5)
MCHC RBC AUTO-ENTMCNC: 30.3 G/DL (ref 28.4–34.8)
MCV RBC AUTO: 106.7 FL (ref 82.6–102.9)
NRBC AUTOMATED: 0 PER 100 WBC
PDW BLD-RTO: 13.8 % (ref 11.8–14.4)
PLATELET # BLD: 417 K/UL (ref 138–453)
PMV BLD AUTO: 9.1 FL (ref 8.1–13.5)
PRO-BNP: 672 PG/ML
RBC # BLD: 2.97 M/UL (ref 3.95–5.11)
WBC # BLD: 5 K/UL (ref 3.5–11.3)

## 2021-04-25 PROCEDURE — 2580000003 HC RX 258: Performed by: NURSE PRACTITIONER

## 2021-04-25 PROCEDURE — 6370000000 HC RX 637 (ALT 250 FOR IP): Performed by: NURSE PRACTITIONER

## 2021-04-25 PROCEDURE — 36415 COLL VENOUS BLD VENIPUNCTURE: CPT

## 2021-04-25 PROCEDURE — 71045 X-RAY EXAM CHEST 1 VIEW: CPT

## 2021-04-25 PROCEDURE — 97535 SELF CARE MNGMENT TRAINING: CPT

## 2021-04-25 PROCEDURE — 99232 SBSQ HOSP IP/OBS MODERATE 35: CPT | Performed by: INTERNAL MEDICINE

## 2021-04-25 PROCEDURE — 6370000000 HC RX 637 (ALT 250 FOR IP): Performed by: INTERNAL MEDICINE

## 2021-04-25 PROCEDURE — 94640 AIRWAY INHALATION TREATMENT: CPT

## 2021-04-25 PROCEDURE — 85027 COMPLETE CBC AUTOMATED: CPT

## 2021-04-25 PROCEDURE — 1200000000 HC SEMI PRIVATE

## 2021-04-25 PROCEDURE — 82947 ASSAY GLUCOSE BLOOD QUANT: CPT

## 2021-04-25 PROCEDURE — 2580000003 HC RX 258: Performed by: INTERNAL MEDICINE

## 2021-04-25 PROCEDURE — 83880 ASSAY OF NATRIURETIC PEPTIDE: CPT

## 2021-04-25 RX ORDER — GUAIFENESIN DEXTROMETHORPHAN HYDROBROMIDE ORAL SOLUTION 10; 100 MG/5ML; MG/5ML
10 SOLUTION ORAL EVERY 4 HOURS PRN
Status: DISCONTINUED | OUTPATIENT
Start: 2021-04-25 | End: 2021-04-27 | Stop reason: HOSPADM

## 2021-04-25 RX ADMIN — CLOPIDOGREL 75 MG: 75 TABLET, FILM COATED ORAL at 08:18

## 2021-04-25 RX ADMIN — GABAPENTIN 300 MG: 300 CAPSULE ORAL at 14:06

## 2021-04-25 RX ADMIN — MIRTAZAPINE 30 MG: 30 TABLET, FILM COATED ORAL at 20:49

## 2021-04-25 RX ADMIN — BUTALBITAL, ACETAMINOPHEN AND CAFFEINE 1 TABLET: 50; 325; 40 TABLET ORAL at 16:44

## 2021-04-25 RX ADMIN — HYDROCODONE BITARTRATE AND ACETAMINOPHEN 1 TABLET: 5; 325 TABLET ORAL at 13:15

## 2021-04-25 RX ADMIN — ATORVASTATIN CALCIUM 40 MG: 80 TABLET, FILM COATED ORAL at 20:48

## 2021-04-25 RX ADMIN — FAMOTIDINE 20 MG: 20 TABLET, FILM COATED ORAL at 08:18

## 2021-04-25 RX ADMIN — SODIUM CHLORIDE, PRESERVATIVE FREE 10 ML: 5 INJECTION INTRAVENOUS at 08:22

## 2021-04-25 RX ADMIN — BUDESONIDE AND FORMOTEROL FUMARATE DIHYDRATE 2 PUFF: 160; 4.5 AEROSOL RESPIRATORY (INHALATION) at 07:55

## 2021-04-25 RX ADMIN — FOLIC ACID 1 MG: 1 TABLET ORAL at 08:18

## 2021-04-25 RX ADMIN — GABAPENTIN 300 MG: 300 CAPSULE ORAL at 20:49

## 2021-04-25 RX ADMIN — FERROUS SULFATE TAB EC 325 MG (65 MG FE EQUIVALENT) 325 MG: 325 (65 FE) TABLET DELAYED RESPONSE at 08:18

## 2021-04-25 RX ADMIN — HYDROCODONE BITARTRATE AND ACETAMINOPHEN 1 TABLET: 5; 325 TABLET ORAL at 03:58

## 2021-04-25 RX ADMIN — GABAPENTIN 300 MG: 300 CAPSULE ORAL at 08:18

## 2021-04-25 RX ADMIN — AMITRIPTYLINE HYDROCHLORIDE 10 MG: 10 TABLET, FILM COATED ORAL at 20:48

## 2021-04-25 RX ADMIN — HYDROCODONE BITARTRATE AND ACETAMINOPHEN 1 TABLET: 5; 325 TABLET ORAL at 20:49

## 2021-04-25 RX ADMIN — Medication 1 TABLET: at 08:19

## 2021-04-25 RX ADMIN — HYDROCODONE BITARTRATE AND ACETAMINOPHEN 1 TABLET: 5; 325 TABLET ORAL at 08:18

## 2021-04-25 RX ADMIN — BUDESONIDE AND FORMOTEROL FUMARATE DIHYDRATE 2 PUFF: 160; 4.5 AEROSOL RESPIRATORY (INHALATION) at 21:38

## 2021-04-25 RX ADMIN — CARVEDILOL 6.25 MG: 12.5 TABLET, FILM COATED ORAL at 16:44

## 2021-04-25 RX ADMIN — CARVEDILOL 6.25 MG: 12.5 TABLET, FILM COATED ORAL at 08:19

## 2021-04-25 RX ADMIN — BUTALBITAL, ACETAMINOPHEN AND CAFFEINE 1 TABLET: 50; 325; 40 TABLET ORAL at 08:18

## 2021-04-25 RX ADMIN — SODIUM CHLORIDE: 9 INJECTION, SOLUTION INTRAVENOUS at 20:57

## 2021-04-25 RX ADMIN — BUTALBITAL, ACETAMINOPHEN AND CAFFEINE 1 TABLET: 50; 325; 40 TABLET ORAL at 22:52

## 2021-04-25 RX ADMIN — FAMOTIDINE 20 MG: 20 TABLET, FILM COATED ORAL at 20:49

## 2021-04-25 ASSESSMENT — PAIN SCALES - GENERAL
PAINLEVEL_OUTOF10: 3
PAINLEVEL_OUTOF10: 8
PAINLEVEL_OUTOF10: 8
PAINLEVEL_OUTOF10: 7
PAINLEVEL_OUTOF10: 3
PAINLEVEL_OUTOF10: 7
PAINLEVEL_OUTOF10: 7
PAINLEVEL_OUTOF10: 5
PAINLEVEL_OUTOF10: 8

## 2021-04-25 ASSESSMENT — ENCOUNTER SYMPTOMS
NAUSEA: 0
COUGH: 0
SHORTNESS OF BREATH: 0
ABDOMINAL PAIN: 1
VOMITING: 0

## 2021-04-25 ASSESSMENT — PAIN DESCRIPTION - LOCATION
LOCATION: HIP
LOCATION: HEAD
LOCATION: HIP
LOCATION: CHEST

## 2021-04-25 ASSESSMENT — PAIN DESCRIPTION - PAIN TYPE
TYPE: CHRONIC PAIN

## 2021-04-25 ASSESSMENT — PAIN DESCRIPTION - PROGRESSION
CLINICAL_PROGRESSION: NOT CHANGED

## 2021-04-25 ASSESSMENT — PAIN DESCRIPTION - FREQUENCY: FREQUENCY: CONTINUOUS

## 2021-04-25 ASSESSMENT — PAIN DESCRIPTION - DESCRIPTORS: DESCRIPTORS: ACHING

## 2021-04-25 ASSESSMENT — PAIN DESCRIPTION - ORIENTATION: ORIENTATION: RIGHT

## 2021-04-25 NOTE — PROGRESS NOTES
Occupational Therapy  Facility/Department: [facility]  Daily Treatment Note  NAME: Emi Daniels  : 1955  MRN: [id number]    Date of Service: 2021    Discharge Recommendations:  Patient would benefit from continued therapy after discharge       Assessment   Performance deficits / Impairments: Decreased functional mobility ; Decreased ADL status; Decreased safe awareness;Decreased endurance;Decreased strength;Decreased high-level IADLs  Assessment: Pt will continue to benefit from skilled occupational therapy services to address the above deficits. Prognosis: Good  REQUIRES OT FOLLOW UP: Yes  Activity Tolerance  Activity Tolerance: Patient Tolerated treatment well;Patient limited by pain  Safety Devices  Safety Devices in place: Yes  Type of devices: Call light within reach; Patient at risk for falls; Left in chair;Nurse notified         Patient Diagnosis(es): The primary encounter diagnosis was CRYSTAL (acute kidney injury) ([address] Diagnoses of Hypoglycemia, Gastrointestinal stromal tumor (GIST) ([address] and Right upper quadrant abdominal pain were also pertinent to this visit. has a past medical history of Appendicitis, Cerebral artery occlusion with cerebral infarction ([address] CHF (congestive heart failure) ([address] Chronic respiratory failure with hypoxia and hypercapnia (HCC), Chronic rhinitis, Cigarette smoker, COPD (chronic obstructive pulmonary disease) ([address] Depression, Dysphagia, Essential hypertension, GERD (gastroesophageal reflux disease), Heart failure, diastolic, with acute decompensation ([address] Hepatitis C, chronic ([address] History of smoking at least 1 pack per day for at least 30 years, Hyperlipidemia, Hypertension, Migraine, Moderate COPD (chronic obstructive pulmonary disease) ([address] Multiple transfusions, Neurogenic dysphagia, Osteoarthritis, Pneumonia, Scoliosis, and Substance abuse ([address] has a past surgical history that includes  section; MORIAH (2002);  Upper hip hurts. I don't sit up in the chair at home\". Pt stated at start of tx that \"I'm homeless, I don't have anywhere to go\". Call light and phone in reach. RN notified. Balance  Sitting Balance: Independent  Standing Balance: Contact guard assistance(w/SW)  Standing Balance  Time: Pt stood for approx 5 min total for transfers, func mob and lu care  Comment: w/SW, no LOB  Functional Mobility  Functional - Mobility Device: Standard Walker  Assist Level: Contact guard assistance  Functional Mobility Comments: No LOB  Toilet Transfers  Toilet - Technique: Ambulating  Equipment Used: Standard bedside commode  Toilet Transfer: Stand by assistance  Bed mobility  Rolling to Left: Stand by assistance  Rolling to Right: Stand by assistance  Supine to Sit: Stand by assistance  Sit to Supine: Unable to assess(left up in chair)  Scooting: Stand by assistance  Transfers  Stand Step Transfers: Contact guard assistance  Sit to stand: Stand by assistance  Stand to sit: Stand by assistance    Plan   Plan  Times per week: 3-4x    Goals  Short term goals  Time Frame for Short term goals: By discharge pt will. Perico Nacho term goal 1: demo functional transfers with mod I  Short term goal 2: demo functional mobility with mod I  Short term goal 3: tolerate 12+ minutes of standing balance to engage in functional tasks with mod I  Short term goal 4: demo UB/LB ADL tasks with mod I  Short term goal 5: tolerate 20+ minutes of activity to increase activity tolerance     Therapy Time   Individual Concurrent Group Co-treatment   Time In  1500         Time Out 1516         Minutes  16 total tx time                 MEETA IBRAHIM/AASHISH

## 2021-04-25 NOTE — PROGRESS NOTES
[facility]    Office: [address], DO, Ingrid aissatou, DO, Charley Leger, DO, Buster Lam Blood, DO, Marimar Jimenez MD, Basil Alvarez MD, Alba Ramirez MD, Miguel Clinton MD, Mckenzie Turcios MD, Exie Ahumada, MD, Neil Veras MD, Iris Acosta MD, Jenelle Veras MD, Ulices Cook DO, Dali Lucia MD, Carmelita Manuel MD, Garfield Butts DO, Berenice Pimentel MD,  Fidel Rice DO, Daniel Centeno MD, Sydni Mejia MD, Lucille Hernandez [facility], [address], [facility], Alexey May, [facility], Suzy Wilson, CNS, Henok Pimentel, CNP, Dane Flavia, CNP, Iain Gonzalez, CNP, Aashish Caputo, CNP, Richard Curiel, CNP, SAMRA Mars-C, Misael Mayers, [facility], Jenn Wilson, CNP, Dannielle Underwood, CNP, Maura Rose, CNP, Emmanuel Henderson, CNP, Sung Snyder, [facility] [place]    Progress Note    4/25/2021    3:55 PM    Name:   Juvencio Arboleda  MRN:     [id number]     Acct:      [de-identified]   Room:   [address] [place] [address] Day:  4  Admit Date:  4/21/2021 11:00 AM    PCP:   No primary care provider on file. Code Status:  Full Code    Subjective:     C/C:   Chief Complaint   Patient presents with    Abdominal Pain    Chest Pain    Headache       Interval History Status:  Abd pain unchanged  Dry cough  Doing ok with diet  Awaiting placement     Data Base Updates:  [id number]     [id number]     WBC5.0k/uL RBC2. 97Low m/uL   Hemoglobin9.6Low g/dL   Zucooceipd73.7Low %   YAW779.7High       Brief History:     As documented in the medical record:  \"Emi Daniels is a 74 y.o. female with past medical history for COPD, diastolic heart failure, depression, chronic hepatitis, homelessness presents to the emergency department for right-sided chest pain and anxiety. Patient was previously seen earlier this month for a pancreatic mass which was biopsied.   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 She is recommended follow-up with general surgery which was supposed to be today. She states she has been so anxious about her diagnosis that she start developing chest pain and shortness of breath. She is unable to go to her appointment today. She states she has difficulty with follow-up due to scheduling issue, does not recall having a conversation with gastroenterology even though this conversation had been charted. During my examination she has reproducible chest wall pain, shortness of breath is improved. She had episode of hypoglycemia while in the emergency department and was given D50, she was also noted to have an CRYSTAL and was admitted for this. General surgery was consulted for recommendations regarding chest, patient is homeless, may not follow-up outpatient. The initial assessment and plan included:  Acute kidney Injury -creatinine 1.46 with a baseline 0.54,  start IV fluids, hold lisinopril. CT abdomen pelvis showing no obstruction. Could be from hypotension versus ACE inhibitor    Right-sided chest pain -chronic issue, did not think ACS. Worsens with palpation and deep inspiration, pain management    Homelessness -patient readmitted after recent discharge, unable to follow-up with appointments. Consult social work for recommendations as patient seems unable to take care of herself    GIST -gastroenterology seems to have contacted her regarding results with patient have no recollection. Patient is homeless, unable to follow-up outpatient and seems to have poor memory. Will consult general surgery to see if resection could possibly be performed inpatient    She had EUS with Bx  Surgical Pathology Report 04/05/2021  1:17 PM [facility]   -- Diagnosis --   PANCREATIC LESION, EUS GUIDED FNB:          GASTROINTESTINAL STROMAL TUMOR (GIST).       Hypoglycemia -given amp of D50, Gastrointestinal: Positive for abdominal pain (Chronic right-sided rib and abdominal pain). Negative for nausea and vomiting. Genitourinary: Negative for flank pain and hematuria. Musculoskeletal: Positive for arthralgias and myalgias. The patient has chronic arthralgias and myalgias    Psychiatric/Behavioral: Negative for agitation and confusion. Physical Examination:        Physical Exam  Vitals signs reviewed. Constitutional:       General: She is not in acute distress. Appearance: She is not diaphoretic. HENT:      Head: Normocephalic. Nose: Nose normal.      Mouth/Throat:      Comments: Dentition poor  Eyes:      General: No scleral icterus. Conjunctiva/sclera: Conjunctivae normal.   Neck:      Musculoskeletal: Neck supple. Trachea: No tracheal deviation. Cardiovascular:      Rate and Rhythm: Normal rate and regular rhythm. Pulmonary:      Effort: Pulmonary effort is normal. No respiratory distress. Breath sounds: Rhonchi present. No wheezing or rales. Chest:      Chest wall: No tenderness. Abdominal:      General: Bowel sounds are normal. There is no distension. Palpations: Abdomen is soft. Tenderness: There is no abdominal tenderness. There is no guarding or rebound. Musculoskeletal:         General: No tenderness. Comments: Muscle mass decreased   Skin:     General: Skin is warm and dry. Neurological:      Mental Status: She is alert. Psychiatric:      Comments: Less agitated  Thoughts better organized       Vitals:  BP (!) 158/80   Pulse 87   Temp 99 °F (37.2 °C) (Oral)   Resp 18   Ht 4' 11\" (1.499 m)   Wt 92 lb 3.2 oz (41.8 kg)   LMP 2015   SpO2 94%   BMI 18.62 kg/m²   Temp (24hrs), Av °F (37.2 °C), Min:98.9 °F (37.2 °C), Max:99 °F (37.2 °C)    Recent Labs     21  1651 21  1956 21  0752 21  1124   POCGLU 220* 118* 107* 135*       I/O (24Hr):     Intake/Output Summary (Last 24 hours) at 4/25/2021 1555  Last data filed at 4/24/2021 1706  Gross per 24 hour   Intake 1320 ml   Output 650 ml   Net 670 ml       Labs:  Hematology:  Recent Labs     04/23/21  0607 04/25/21  0651   WBC 4.4 5.0   RBC 3.08* 2.97*   HGB 9.9* 9.6*   HCT 30.9* 31.7*   .3 106.7*   MCH 32.1 32.3   MCHC 32.0 30.3   RDW 13.9 13.8   * 417   MPV 9.1 9.1     Chemistry:  Recent Labs     04/23/21  0607 04/25/21  0651     --    K 4.0  --    *  --    CO2 19*  --    GLUCOSE 96  --    BUN 15  --    CREATININE 1.12*  --    MG 1.2*  --    ANIONGAP 8*  --    LABGLOM 48*  --    GFRAA 59*  --    CALCIUM 7.8*  --    PHOS 2.4*  --    PROBNP  --  672*     Recent Labs     04/23/21  0607 04/23/21  0607 04/24/21  0826 04/24/21  1115 04/24/21  1651 04/24/21  1956 04/25/21  0752 04/25/21  1124   LABALBU 3.2*  --   --   --   --   --   --   --    POCGLU  --    < > 122* 128* 220* 118* 107* 135*    < > = values in this interval not displayed. ABG:  Lab Results   Component Value Date    POCPH 7.366 01/08/2019    POCPCO2 51.0 01/08/2019    POCPO2 65.9 01/08/2019    POCHCO3 29.2 01/08/2019    NBEA NOT REPORTED 01/08/2019    PBEA 3 01/08/2019    VYO4DMZ 31 01/08/2019    AAOY7EEJ 92 01/08/2019    FIO2 NOT REPORTED 09/04/2019     Lab Results   Component Value Date/Time    SPECIAL NOT REPORTED 04/04/2021 12:09 AM     Lab Results   Component Value Date/Time    CULTURE NO SIGNIFICANT GROWTH 04/04/2021 12:09 AM       Radiology:  Ct Abdomen Pelvis W Iv Contrast Additional Contrast? None    Result Date: 4/21/2021  No acute abdominal or pelvic abnormality. Redemonstration of a peripherally enhancing mass in the pancreatic body that is not significantly changed compared to prior examination.          Assessment:        Principal Problem:    CRYSTAL (acute kidney injury) ([address]  Active Problems:    Essential hypertension    History of substance abuse ([address]    Chronic heart failure with reduced ejection fraction and diastolic dysfunction ([address] Alcoholic cirrhosis of liver without ascites (HCC)    Psychosis, intermittent requring antipsychotic meds    Pancreas head lesion    Polysubstance abuse (HCC)    Avascular necrosis of bone of right hip (HCC)    COPD without exacerbation (HCC)    Hypokalemia    History of hepatitis C    Protein calorie malnutrition (HCC)    Avascular necrosis of left femur (HCC)    Multiple subsegmental pulmonary emboli without acute cor pulmonale (HCC)    Pancreatic mass    Homeless    Right-sided chest wall pain    H/O malignant gastrointestinal stromal tumor (GIST)    Gastrointestinal stromal tumor (GIST) (HCC)    Hypoglycemia    History of pulmonary embolism    Noncompliance    Crack cocaine use    Episode of recurrent major depressive disorder ([facility]    Atelectasis  Resolved Problems:    * No resolved hospital problems. *      Plan:        Robitussin for cough  Check chest x-ray  Incentive spirometry, atelectasis  Aerosols  Check bun and creatinine  Lisinopril on hold  Pain control  Oncology evaluation - GIST, pancreatic mass thrombocytosis in progress  Social service consult, the patient has been homeless  Blood Pressure - Monitor and control   Nutritional supplementation  Correct electrolyte abnormalities  Trend troponins, doubt non-ST elevation MI  Glycemic contol - monitor and control blood sugars   Hx of PE last July, Heparin initiated   Eliquis on hold  The patient is homeless  She was thrown out of the [employer] for drug use  She no longer has a PCP  Encourage compliance  Palliative care consulted  Service consult for placement  [facility] referral for surgical treatment of her GIST tumor  Discharge planning  Precertification in progress  Will discharge when arrangements complete and ok with other services.   Follow-up with PCP in one week   Notify PCP of discharge     IP CONSULT TO HOSPITALIST  IP CONSULT TO SOCIAL WORK  IP CONSULT TO GENERAL SURGERY  IP CONSULT TO IV TEAM  IP CONSULT TO HEM/ONC  IP CONSULT TO PSYCHIATRY IP CONSULT TO PALLIATIVE CARE  CHEMICAL DEPENDENCY REFERRAL FOR SOCIAL SERVICE CONSULT  IP CONSULT TO SOCIAL WORK    Raffi Sanchez DO  4/25/2021  3:55 PM

## 2021-04-25 NOTE — PROGRESS NOTES
Today's Date: 4/25/2021  Patient Name: Rika Case  Date of admission: 4/21/2021 11:00 AM  Patient's age: 74 y.o., 1955  Admission Dx: Right-sided chest wall pain [R07.89]  Acute kidney injury ([facility] [address] [N17.9]    Reason for Consult: management recommendations  Requesting Physician: Karrie Leonard DO    CHIEF COMPLAINT: Abdominal pain. Nausea vomiting. Weight loss. History Obtained From:  patient, electronic medical record    Interval history:    Patient seen and examined  Labs and vitals reviewed  Patient still complains of abdominal pain  Interested in surgery but wants it done locally. HISTORY OF PRESENT ILLNESS:      The patient is a 74 y.o.  female who presents with a emergency department with chief complaint of chest pain. Patient was recently diagnosed with just tumor on endoscopy performed on 4/5/2021. Patient was recently hospitalized with complaints of nausea and vomiting. Underwent CT scan which showed a 2.3 cm lesion arising from the mid pancreatic body. Patient underwent EGD with EUS EGD showed a small hiatal hernia otherwise was unremarkable. EUS showed lesion in the pancreatic body 2.3 x 1.8 mm well-defined borders. 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 Pathology report does not mention proliferative index. Patient again had a CT scan done in the ER which showed mildly distended stomach and duodenum. Did not show any obstruction. Patient does complain of weight loss over the last couple of months and recently had difficulties eating due to complaining of nausea. The tumor in the pancreas does not seem to cause any obstruction. Patient has been evaluated by surgery who are recommending low residue diet.   Patient's BMI is 19      Past Medical History:   has a past medical history of Appendicitis, Cerebral artery occlusion with cerebral infarction [facility]), CHF (congestive heart failure) ([facility] [address] Chronic respiratory failure with hypoxia and hypercapnia ([address] Chronic rhinitis, Cigarette smoker, COPD (chronic obstructive pulmonary disease) ([address] Depression, Dysphagia, Essential hypertension, GERD (gastroesophageal reflux disease), Heart failure, diastolic, with acute decompensation ([address] Hepatitis C, chronic ([address] History of smoking at least 1 pack per day for at least 30 years, Hyperlipidemia, Hypertension, Migraine, Moderate COPD (chronic obstructive pulmonary disease) ([address] Multiple transfusions, Neurogenic dysphagia, Osteoarthritis, Pneumonia, Scoliosis, and Substance abuse ([address] Past Surgical History:   has a past surgical history that includes  section; LEEP (); Upper gastrointestinal endoscopy (); Gastric bypass surgery; Appendectomy (2017); laparoscopic appendectomy (N/A, 3/27/2017); Upper gastrointestinal endoscopy (2018); Upper gastrointestinal endoscopy (N/A, 2018); pr colonoscopy w/biopsy single/multiple (N/A, 11/10/2018); pr egd transoral biopsy single/multiple (N/A, 2018); hip surgery (Right, 2019); Total hip arthroplasty (Right, 2019); Medication Injection (Left, 3/25/2021); and Upper gastrointestinal endoscopy (N/A, 2021). Medications:    Prior to Admission medications    Medication Sig Start Date End Date Taking? Authorizing Provider   famotidine (PEPCID) 20 MG tablet Take 1 tablet by mouth 2 times daily 21   Oscar Lloyd DO   mometasone-formoterol [facility]) 100-5 MCG/ACT inhaler Inhale 2 puffs into the lungs 2 times daily 21   Oscar Lloyd DO   mirtazapine (REMERON) 30 MG tablet Take 1 tablet by mouth nightly 21   Oscar Lloyd DO   lisinopril (PRINIVIL;ZESTRIL) 10 MG tablet Take 1 tablet by mouth daily 21   Oscar Lloyd DO   gabapentin (NEURONTIN) 300 MG capsule Take 1 capsule by mouth 3 times daily for 30 days.  21  Oscar Lloyd DO   ferrous sulfate (MELISSA-HAO) 325 (65 Fe) MG tablet Take 1 tablet by mouth daily 21   Oscar Lloyd DO   clopidogrel (PLAVIX) 75 MG tablet Take 1 tablet by mouth daily 4/6/21   Ginger Ríos,    carvedilol (COREG) 6.25 MG tablet Take 1 tablet by mouth 2 times daily (with meals) 4/6/21   Ginger Ríos DO   atorvastatin (LIPITOR) 40 MG tablet Take 1 tablet by mouth nightly 4/6/21   Ginger Ríos, DO   apixaban (ELIQUIS) 5 MG TABS tablet Take 1 tablet by mouth 2 times daily 4/6/21   Ginger Ríos, DO   amitriptyline (ELAVIL) 10 MG tablet Take 1 tablet by mouth nightly 4/6/21   Ginger Ríos, DO   albuterol sulfate HFA (PROVENTIL HFA) 108 (90 Base) MCG/ACT inhaler Inhale 1-2 puffs into the lungs once for 1 dose 4/6/21 4/6/21  Ginger Ríos DO   butalbital-acetaminophen-caffeine (FIORICET, ESGIC) -62 MG per tablet Take 1 tablet by mouth every 12 hours for 5 doses 3/28/21 3/31/21  Xu Peters MD   Elastic Bandages & Supports (LUMBAR BACK BRACE/SUPPORT PAD) MISC 1 Units by Does not apply route daily 2/10/21 2/10/22  Emerita Aponte MD   amLODIPine (NORVASC) 10 MG tablet Take 1 tablet by mouth daily 2/9/21 2/9/21  Salma Andrade MD   Sennosides (SENNA) 8.6 MG CAPS 8.6 capsules 8/2/20   Ramon Dye MD   [facility]  Devices [facility]) MISC 1 Device by Does not apply route once for 1 dose 8/5/19 8/5/19  Bruna Mcallister PA-C   ipratropium-albuterol (DUONEB) 0.5-2.5 (3) MG/3ML SOLN nebulizer solution Inhale 3 mLs into the lungs every 4 hours as needed for Shortness of Breath 1/11/19   Sariah Dimas MD   folic acid (FOLVITE) 1 MG tablet Take 1 tablet by mouth daily 10/13/18   Priscille Cushing, MD   vitamin B-1 100 MG tablet Take 1 tablet by mouth daily 10/13/18   Priscille Cushing, MD   vitamin B-12 250 MCG tablet Take 1 tablet by mouth daily 10/13/18   Priscille Cushing, MD   Multiple Vitamins-Minerals (THERAPEUTIC MULTIVITAMIN-MINERALS) tablet Take 1 tablet by mouth daily 8/12/18   Leda Jerry MD     Current Facility-Administered Medications   Medication Dose Route Frequency Provider Last Rate Last Admin    butalbital-acetaminophen-caffeine (FIORICET, [address]) per tablet 1 tablet  1 tablet Oral Q6H PRN Mylan Guppy, DO   1 tablet at 04/25/21 0818    lidocaine 1 % injection 5 mL  5 mL Intradermal Once Sharalyn Mateo, DO   Stopped at 04/22/21 5614    sodium chloride flush 0.9 % injection 5-40 mL  5-40 mL Intravenous 2 times per day Ramone Galindo, DO   5 mL at 04/24/21 7172    sodium chloride flush 0.9 % injection 5-40 mL  5-40 mL Intravenous PRN Ramone Guppy, DO        0.9 % sodium chloride infusion  25 mL Intravenous PRN Mylan Guppy, DO        HYDROcodone-acetaminophen [facility]) 5-325 MG per tablet 1 tablet  1 tablet Oral Q4H PRN Mylan Guppy, DO   1 tablet at 04/25/21 1315    amitriptyline (ELAVIL) tablet 10 mg  10 mg Oral Nightly Vesta Campos, APRN - NP   10 mg at 04/24/21 2104    atorvastatin (LIPITOR) tablet 40 mg  40 mg Oral Nightly Vesta Campos, APRN - NP   40 mg at 04/24/21 2104    carvedilol (COREG) tablet 6.25 mg  6.25 mg Oral BID WC Vesta Campos, APRN - NP   6.25 mg at 04/25/21 0819    clopidogrel (PLAVIX) tablet 75 mg  75 mg Oral Daily Vesta Campos, APRN - NP   75 mg at 04/25/21 0818    famotidine (PEPCID) tablet 20 mg  20 mg Oral BID Vesta Campos, APRN - NP   20 mg at 04/25/21 0818    ferrous sulfate (FE TABS 325) EC tablet 325 mg  325 mg Oral Daily Vesta Campos, APRN - NP   325 mg at 00/87/56 9503    folic acid (FOLVITE) tablet 1 mg  1 mg Oral Daily Vesta Campos, APRN - NP   1 mg at 04/25/21 0818    gabapentin (NEURONTIN) capsule 300 mg  300 mg Oral TID Vesta Campos, APRN - NP   300 mg at 04/25/21 1406    ipratropium-albuterol (DUONEB) nebulizer solution 3 mL  1 vial Inhalation Q4H PRN Vesta Campos, APRN - NP        mirtazapine (REMERON) tablet 30 mg  30 mg Oral Nightly JAYDON Longoria NP   30 mg at 04/24/21 5427    budesonide-formoterol (SYMBICORT) 160-4.5 MCG/ACT inhaler 2 puff  2 puff Inhalation BID JAYDON Longoria NP   2 puff at 04/25/21 8455    therapeutic multivitamin-minerals 1 tablet  1 tablet Oral Daily Fabiene Sailors, APRN - NP   1 tablet at 04/25/21 2795    sodium chloride flush 0.9 % injection 5-40 mL  5-40 mL Intravenous 2 times per day Fabiene Sailors, APRN - NP   10 mL at 04/25/21 9218    sodium chloride flush 0.9 % injection 10 mL  10 mL Intravenous PRN Fabiene Sailors, APRN - NP        0.9 % sodium chloride infusion  25 mL Intravenous PRN Fabiene Sailors, APRN - NP        potassium chloride (KLOR-CON M) extended release tablet 40 mEq  40 mEq Oral PRN Fabiene Sailors, APRN - NP        Or   Priscilla Nakai potassium bicarb-citric acid (EFFER-K) effervescent tablet 40 mEq  40 mEq Oral PRN Fabiene Sailors, APRN - NP        Or   Priscilla Nakai potassium chloride 10 mEq/100 mL IVPB (Peripheral Line)  10 mEq Intravenous PRN Fabiene Sailors, APRN - NP        magnesium sulfate 1000 mg in dextrose 5% 100 mL IVPB  1,000 mg Intravenous PRN Fabiene Sailors, APRN - NP        promethazine (PHENERGAN) tablet 12.5 mg  12.5 mg Oral Q6H PRN Fabiene Sailors, APRN - NP   12.5 mg at 04/24/21 2155    Or    ondansetron (ZOFRAN) injection 4 mg  4 mg Intravenous Q6H PRN Fabiene Sailors, APRN - NP   4 mg at 04/23/21 2042    polyethylene glycol (GLYCOLAX) packet 17 g  17 g Oral Daily PRN Fabiene Sailors, APRN - NP        acetaminophen (TYLENOL) tablet 650 mg  650 mg Oral Q6H PRN Fabiene Sailors, APRN - NP        Or   Priscilla Nakai acetaminophen (TYLENOL) suppository 650 mg  650 mg Rectal Q6H PRN Fabiene Sailors, APRN - NP        hydrOXYzine (ATARAX) tablet 25 mg  25 mg Oral TID PRN Fabiene Sailors, APRN - NP   25 mg at 04/22/21 2103    glucose (GLUTOSE) 40 % oral gel 15 g  15 g Oral PRN Fabiene Sailors, APRN - NP        dextrose 50 % IV solution  12.5 g Intravenous PRN Fabiene Sailors, APRN - NP        glucagon (rDNA) injection 1 mg  1 mg Intramuscular PRN Fabiene Sailors, APRN - NP        dextrose 5 % solution  100 mL/hr Intravenous PRN Fabiene Sailors, APRN - NP        0.9 % sodium chloride infusion   Intravenous Continuous Donald Puentes,  mL/hr at 04/24/21 1706 New Bag at 04/24/21 1706    heparin (porcine) injection 3,430 Units  80 Units/kg Intravenous PRN Cyn Melena, DO        heparin (porcine) injection 1,720 Units  40 Units/kg Intravenous PRN Cyn Melena, DO        heparin 25,000 units in dextrose 5% 250 mL (premix) infusion  5-30 Units/kg/hr Intravenous Continuous Cyn Melena, DO   Stopped at 04/22/21 0050       Allergies:  Aspirin and Keflet [cephalexin]    Social History:   reports that she quit smoking about 2 years ago. Her smoking use included cigarettes. She started smoking about 51 years ago. She has a 40.00 pack-year smoking history. She has never used smokeless tobacco. She reports that she does not drink alcohol or use drugs. Family History: family history includes Breast Cancer in her mother; Heart Disease in her father. REVIEW OF SYSTEMS:      Constitutional: No fever or chills. No night sweats, positive weight loss. Eyes: No eye discharge, double vision, or eye pain   HEENT: negative for sore mouth, sore throat, hoarseness and voice change   Respiratory: negative for cough , sputum, dyspnea, wheezing, hemoptysis, chest pain   Cardiovascular: negative for chest pain, dyspnea, palpitations, orthopnea, PND   Gastrointestinal: Positive abdominal pain  Genitourinary: negative for frequency, dysuria, nocturia, urinary incontinence, and hematuria   Integument: negative for rash, skin lesions, bruises. Hematologic/Lymphatic: negative for easy bruising, bleeding, lymphadenopathy, or petechiae   Endocrine: negative for heat or cold intolerance,weight changes, change in bowel habits and hair loss   Musculoskeletal: negative for myalgias, arthralgias, pain, joint swelling,and bone pain   Neurological: negative for headaches, dizziness, seizures, weakness, numbness. Positive for memory.     PHYSICAL EXAM:        BP (!) 158/80   Pulse 87   Temp 99 °F (37.2 °C) (Oral)   Resp 18   Ht 4' 11\" (1.499 m)   Wt 92 lb 3.2 oz (41.8 kg) LMP 2015   SpO2 94%   BMI 18.62 kg/m²    Temp (24hrs), Av °F (37.2 °C), Min:98.9 °F (37.2 °C), Max:99 °F (37.2 °C)      General appearance -frail appearing, no in pain or distress   Mental status - alert and cooperative   Eyes - pupils equal and reactive, extraocular eye movements intact   Ears - bilateral TM's and external ear canals normal   Mouth - mucous membranes moist, pharynx normal without lesions   Neck - supple, no significant adenopathy   Lymphatics - no palpable lymphadenopathy, no hepatosplenomegaly   Chest - clear to auscultation, no wheezes, rales or rhonchi, symmetric air entry   Heart - normal rate, regular rhythm, normal S1, S2, no murmurs  Abdomen -soft. Nondistended.   Positive tender  Neurological - alert, oriented, normal speech, no focal findings or movement disorder noted   Musculoskeletal - no joint tenderness, deformity or swelling   Extremities - peripheral pulses normal, no pedal edema, no clubbing or cyanosis   Skin - normal coloration and turgor, no rashes, no suspicious skin lesions noted ,      DATA:      Labs:     Results for orders placed or performed during the hospital encounter of 21   CBC   Result Value Ref Range    WBC 5.1 3.5 - 11.3 k/uL    RBC 4.24 3.95 - 5.11 m/uL    Hemoglobin 13.7 11.9 - 15.1 g/dL    Hematocrit 42.2 36.3 - 47.1 %    MCV 99.5 82.6 - 102.9 fL    MCH 32.3 25.2 - 33.5 pg    MCHC 32.5 28.4 - 34.8 g/dL    RDW 13.3 11.8 - 14.4 %    Platelets 233 (H) 135 - 453 k/uL    MPV 9.0 8.1 - 13.5 fL    NRBC Automated 0.0 0.0 per 100 WBC   COMPREHENSIVE METABOLIC PANEL   Result Value Ref Range    Glucose 46 (L) 70 - 99 mg/dL    BUN 23 8 - 23 mg/dL    CREATININE 1.46 (H) 0.50 - 0.90 mg/dL    Bun/Cre Ratio NOT REPORTED 9 - 20    Calcium 9.3 8.6 - 10.4 mg/dL    Sodium 139 135 - 144 mmol/L    Potassium 3.6 (L) 3.7 - 5.3 mmol/L    Chloride 103 98 - 107 mmol/L    CO2 16 (L) 20 - 31 mmol/L    Anion Gap 20 (H) 9 - 17 mmol/L    Alkaline Phosphatase 83 35 - 104 U/L ALT 20 5 - 33 U/L    AST 39 (H) <32 U/L    Total Bilirubin 0.46 0.3 - 1.2 mg/dL    Total Protein 8.4 (H) 6.4 - 8.3 g/dL    Albumin 4.2 3.5 - 5.2 g/dL    Albumin/Globulin Ratio 1.0 1.0 - 2.5    GFR Non- 36 (L) >60 mL/min    GFR  43 (L) >60 mL/min    GFR Comment          GFR Staging NOT REPORTED    LIPASE   Result Value Ref Range    Lipase 54 13 - 60 U/L   LACTIC ACID, WHOLE BLOOD   Result Value Ref Range    Lactic Acid, Whole Blood 1.8 0.7 - 2.1 mmol/L   Troponin   Result Value Ref Range    Troponin, High Sensitivity 19 (H) 0 - 14 ng/L    Troponin T NOT REPORTED <0.03 ng/mL    Troponin Interp NOT REPORTED    Urinalysis with microscopic   Result Value Ref Range    Color, UA YELLOW YELLOW    Turbidity UA CLEAR CLEAR    Glucose, Ur NEGATIVE NEGATIVE    Bilirubin Urine NEGATIVE NEGATIVE    Ketones, Urine NEGATIVE NEGATIVE    Specific Gravity, UA 1.047 (H) 1.005 - 1.030    Urine Hgb NEGATIVE NEGATIVE    pH, UA 6.5 5.0 - 8.0    Protein, UA NEGATIVE NEGATIVE    Urobilinogen, Urine Normal Normal    Nitrite, Urine NEGATIVE NEGATIVE    Leukocyte Esterase, Urine TRACE (A) NEGATIVE    -          WBC, UA 10 TO 20 0 - 5 /HPF    RBC, UA 2 TO 5 0 - 4 /HPF    Casts UA  0 - 8 /LPF     0 TO 2 HYALINE Reference range defined for non-centrifuged specimen. Crystals, UA NOT REPORTED None /HPF    Epithelial Cells UA 2 TO 5 0 - 5 /HPF    Renal Epithelial, UA NOT REPORTED 0 /HPF    Bacteria, UA NOT REPORTED None    Mucus, UA NOT REPORTED None    Trichomonas, UA NOT REPORTED None    Amorphous, UA NOT REPORTED None    Other Observations UA NOT REPORTED NOT REQ.     Yeast, UA NOT REPORTED None   APTT   Result Value Ref Range    PTT 24.0 20.5 - 30.5 sec   CEA   Result Value Ref Range    CEA 3.0 <3.9 ng/mL   CANCER ANTIGEN 19-9   Result Value Ref Range    CA 19-9 <1 0 - 35 U/mL   CBC   Result Value Ref Range    WBC 4.4 3.5 - 11.3 k/uL    RBC 3.08 (L) 3.95 - 5.11 m/uL    Hemoglobin 9.9 (L) 11.9 - 15.1 g/dL Hematocrit 30.9 (L) 36.3 - 47.1 %    .3 82.6 - 102.9 fL    MCH 32.1 25.2 - 33.5 pg    MCHC 32.0 28.4 - 34.8 g/dL    RDW 13.9 11.8 - 14.4 %    Platelets 875 (H) 332 - 453 k/uL    MPV 9.1 8.1 - 13.5 fL    NRBC Automated 0.0 0.0 per 100 WBC   Renal Function Panel   Result Value Ref Range    Glucose 96 70 - 99 mg/dL    BUN 15 8 - 23 mg/dL    CREATININE 1.12 (H) 0.50 - 0.90 mg/dL    Bun/Cre Ratio NOT REPORTED 9 - 20    Calcium 7.8 (L) 8.6 - 10.4 mg/dL    Albumin 3.2 (L) 3.5 - 5.2 g/dL    Phosphorus 2.4 (L) 2.6 - 4.5 mg/dL    Sodium 142 135 - 144 mmol/L    Potassium 4.0 3.7 - 5.3 mmol/L    Chloride 115 (H) 98 - 107 mmol/L    CO2 19 (L) 20 - 31 mmol/L    Anion Gap 8 (L) 9 - 17 mmol/L    GFR Non-African American 48 (L) >60 mL/min    GFR  59 (L) >60 mL/min    GFR Comment          GFR Staging NOT REPORTED    Magnesium   Result Value Ref Range    Magnesium 1.2 (L) 1.6 - 2.6 mg/dL   AFP Tumor Marker   Result Value Ref Range    AFP (Alpha Fetoprotein) 3.0 <8.4 ug/L   CBC   Result Value Ref Range    WBC 5.0 3.5 - 11.3 k/uL    RBC 2.97 (L) 3.95 - 5.11 m/uL    Hemoglobin 9.6 (L) 11.9 - 15.1 g/dL    Hematocrit 31.7 (L) 36.3 - 47.1 %    .7 (H) 82.6 - 102.9 fL    MCH 32.3 25.2 - 33.5 pg    MCHC 30.3 28.4 - 34.8 g/dL    RDW 13.8 11.8 - 14.4 %    Platelets 504 019 - 863 k/uL    MPV 9.1 8.1 - 13.5 fL    NRBC Automated 0.0 0.0 per 100 WBC   Brain Natriuretic Peptide   Result Value Ref Range    Pro- (H) <300 pg/mL    BNP Interpretation Pro-BNP Reference Range:    POC Glucose Fingerstick   Result Value Ref Range    POC Glucose 37 (LL) 65 - 105 mg/dL   POC Glucose Fingerstick   Result Value Ref Range    POC Glucose 214 (H) 65 - 105 mg/dL   POC Glucose Fingerstick   Result Value Ref Range    POC Glucose 171 (H) 65 - 105 mg/dL   POC Glucose Fingerstick   Result Value Ref Range    POC Glucose 113 (H) 65 - 105 mg/dL   POC Glucose Fingerstick   Result Value Ref Range    POC Glucose 127 (H) 65 - 105 mg/dL   POC Glucose Fingerstick   Result Value Ref Range    POC Glucose 132 (H) 65 - 105 mg/dL   POC Glucose Fingerstick   Result Value Ref Range    POC Glucose 102 65 - 105 mg/dL   POC Glucose Fingerstick   Result Value Ref Range    POC Glucose 166 (H) 65 - 105 mg/dL   POC Glucose Fingerstick   Result Value Ref Range    POC Glucose 136 (H) 65 - 105 mg/dL   POC Glucose Fingerstick   Result Value Ref Range    POC Glucose 122 (H) 65 - 105 mg/dL   POC Glucose Fingerstick   Result Value Ref Range    POC Glucose 128 (H) 65 - 105 mg/dL   POC Glucose Fingerstick   Result Value Ref Range    POC Glucose 220 (H) 65 - 105 mg/dL   POC Glucose Fingerstick   Result Value Ref Range    POC Glucose 118 (H) 65 - 105 mg/dL   POC Glucose Fingerstick   Result Value Ref Range    POC Glucose 107 (H) 65 - 105 mg/dL   POC Glucose Fingerstick   Result Value Ref Range    POC Glucose 135 (H) 65 - 105 mg/dL   EKG 12 Lead   Result Value Ref Range    Ventricular Rate 97 BPM    Atrial Rate 97 BPM    P-R Interval 144 ms    QRS Duration 68 ms    Q-T Interval 386 ms    QTc Calculation (Bazett) 490 ms    P Axis 99 degrees    R Axis 68 degrees    T Axis 72 degrees   EKG 12 Lead   Result Value Ref Range    Ventricular Rate 81 BPM    Atrial Rate 81 BPM    P-R Interval 176 ms    QRS Duration 82 ms    Q-T Interval 344 ms    QTc Calculation (Bazett) 399 ms    P Axis 75 degrees    R Axis 56 degrees    T Axis -32 degrees         IMAGING DATA:    Us Gi Endoscopic S&i    Result Date: 4/5/2021  Radiology exam is complete. No Radiologist dictation. Please follow up with ordering provider. Ct Abdomen Pelvis W Iv Contrast Additional Contrast? None    Result Date: 4/21/2021  EXAMINATION: CT OF THE ABDOMEN AND PELVIS WITH CONTRAST 4/21/2021 12:58 pm TECHNIQUE: CT of the abdomen and pelvis was performed with the administration of intravenous contrast. Multiplanar reformatted images are provided for review.  Dose modulation, iterative reconstruction, and/or weight based adjustment of the mA/kV was utilized to reduce the radiation dose to as low as reasonably achievable. COMPARISON: CT abdomen and pelvis performed 04/03/2021. HISTORY: ORDERING SYSTEM PROVIDED HISTORY: abdominal pain, recent FNA of pancreatic mass, on Anticoagulation TECHNOLOGIST PROVIDED HISTORY: abdominal pain, recent FNA of pancreatic mass, on Anticoagulation Decision Support Exception->Emergency Medical Condition ([place]) Reason for Exam: abdominal pain, recent FNA of pancreatic mass, on Anticoagulation Acuity: Unknown Type of Exam: Unknown FINDINGS: Lower Chest: The lung bases are without consolidation or effusion. Visualized abdominal structures are unremarkable. Organs: The liver and spleen are normal size and overall attenuation. The gallbladder and adrenal glands are unremarkable. There is redemonstration of a peripherally enhancing mass in the pancreatic body that is not significantly changed compared to prior exam.  The kidneys are without obstructive uropathy. The ureters are not dilated. The urinary bladder is unremarkable. GI/Bowel: The stomach is fluid-filled and otherwise unremarkable loops of small bowel are normal in caliber without evidence for obstruction. The colon contains air and fecal residue and is otherwise unremarkable. There is no intraperitoneal free air or free fluid. Dallin Cumber Pelvis: Uterus appears age-appropriate. Peritoneum/Retroperitoneum: The psoas muscles are symmetric. The abdominal aorta is normal in caliber. The inferior vena cava is unremarkable. There is no retroperitoneal or mesenteric adenopathy. Bones/Soft Tissues: The extra-abdominal soft tissues are unremarkable. There is no acute osseous abnormality. No acute abdominal or pelvic abnormality. Redemonstration of a peripherally enhancing mass in the pancreatic body that is not significantly changed compared to prior examination.      Ct Abdomen Pelvis W Iv Contrast Additional Contrast? None    Result Date: 4/3/2021 EXAMINATION: CT OF THE ABDOMEN AND PELVIS WITH CONTRAST 4/3/2021 10:29 pm TECHNIQUE: CT of the abdomen and pelvis was performed with the administration of intravenous contrast. Multiplanar reformatted images are provided for review. Dose modulation, iterative reconstruction, and/or weight based adjustment of the mA/kV was utilized to reduce the radiation dose to as low as reasonably achievable. COMPARISON: 10/20/2020 HISTORY: ORDERING SYSTEM PROVIDED HISTORY: right upper quadrant pain TECHNOLOGIST PROVIDED HISTORY: right upper quadrant pain Decision Support Exception->Emergency Medical Condition (MA) Reason for Exam: right upper quadrant pain Acuity: Acute Type of Exam: Initial FINDINGS: Lower Chest: The visualized part of lung bases are clear. Organs: 2.3 x 1.9 cm peripherally enhancing lesion appears to arise from the mid body of the pancreas. The peripheral aspect of this lesion has a more solid appearance in comparison to prior exam.  There is also associated mild dilatation of pancreatic duct. 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 There is unchanged focal area of fatty infiltration involving the anterior aspect of the liver near ligamentum teres. The liver, spleen, adrenal glands, gallbladder,  kidneys and ureters and pelvic organs including the urinary bladder appear unremarkable. GI/Bowel:Apparent hyperenhancement of the mucosa of the stomach. There has been interval mild thickening of the wall of the ascending colon which was not present on prior examination. The finding may be suggestive of infectious/inflammatory colitis. Peritoneum/Retroperitoneum: No free air or free fluid is noted. No pathologically enlarged lymphadenopathy. The vasculature do not demonstrate acute abnormality. Bones/Soft Tissues: Mild levoscoliosis with tip at L3-L4. Severe degenerative changes of left hip joint. status post right hip arthroplasty. Mild disc degenerative disease at L4-L5. Thickening of the wall of the ascending colon with mild surrounding fatty stranding is likely suggestive of infectious/inflammatory colitis. Apparent hyperenhancement of the mucosa of the stomach. Finding is nonspecific but may be suggestive of gastritis. 2.3 x 1.9 cm peripherally enhancing lesion appears to arise from the mid body of the pancreas. The peripheral aspect of this lesion has a more solid appearance in comparison to prior exam. There is also associated mild dilatation of pancreatic duct. 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 For further evaluation of this finding contrast enhance MRCP examination is recommended. Severe degenerative changes of the left hip joint with bone-on-bone appearance. Xr Chest Portable    Result Date: 3/29/2021  EXAMINATION: ONE XRAY VIEW OF THE CHEST 3/29/2021 9:30 am COMPARISON: Chest radiograph performed 10/20/2020. HISTORY: ORDERING SYSTEM PROVIDED HISTORY: chest pain sob TECHNOLOGIST PROVIDED HISTORY: chest pain sob Reason for Exam: short of breath FINDINGS: There is no acute consolidation or effusion. There is no pneumothorax. The mediastinal structures are unremarkable. The upper abdomen is unremarkable. The extrathoracic soft tissues are unremarkable. There is no acute osseous abnormality. No acute cardiopulmonary process. Fluoro For Surgical Procedures    Result Date: 3/25/2021  Radiology exam is complete. No Radiologist dictation. Please follow up with ordering provider. Xr Abdomen (2 Views)    Result Date: 3/29/2021  EXAMINATION: TWO XRAY VIEWS OF THE ABDOMEN 3/29/2021 10:12 am COMPARISON: July 19, 2020; same date chest radiograph HISTORY: ORDERING SYSTEM PROVIDED HISTORY: upper abdominal pain TECHNOLOGIST PROVIDED HISTORY: upper abdominal pain Reason for Exam: upper abdominal pain FINDINGS: No intraperitoneal free air. Nonspecific but nonobstructive bowel gas pattern.   No abnormal calcifications overlying the gallbladder urinary tract. No mass effect. Partially visualized right total hip arthroplasty appears intact. There is severe/advanced left hip osteoarthrosis with evidence for AVN to include flattening/deformity of the left femoral head. Nonspecific but nonobstructive bowel gas pattern. Indication:   Pancreatic body lesion     Postprocedure diagnosis: Pancreatic body lesion-fine-needle biopsy performed     Anesthesia:  MAC     Complications: None     Specimen: Sent to the lab     Description of Procedure:  Informed consent was obtained from the patient after explanation of the procedure including indications, description of the procedure,  benefits and possible risks and complications of the procedure, and alternatives. Questions were answered. The patient's history was reviewed and a directed physical examination was performed prior to the procedure. Patient was monitored throughout the procedure with pulse oximetry and periodic assessment of vital signs. Patient was sedated as noted above. With the patient in the left lateral decubitus position, the Olympus videoendoscope followed by endoechoendoscope was placed in the patient's mouth and under direct visualization passed into the esophagus. The scope was passed to the 2nd portion of the duodenum. The patient tolerated the procedure well and was taken to the recovery area in good condition. Estimated blood loss was none. EGD Findings[de-identified]   Esophagus: normal.   Stomach: Stomach had small sliding hiatal hernia. Stomach was otherwise normal.  Duodenum: normal     EUS findings:  Pancreas: There was a lesion near the pancreatic body measuring 23 mm x 18 mm. This had well-defined borders. There was a small calcifications within the lesion. This was solid-appearing. This was hypoechoic. Fine-needle biopsy of this lesion was performed via transgastric route with 22-gauge fine-needle biopsy needle. 4 passes were made.   Preliminary cytology showed adequate cellularity. The rest of the pancreas otherwise appeared normal. Normal PD. No signs of chronic pancreatitis. CBD: Normal, no stones, sludge. CBD diameter: 5 mm. Left adrenal: Normal.   Ampulla: Normal.  Left lobe of liver: normal.   Lymphadenopathy: No pathologic lymphadenopathy seen in upper abdomen. IMPRESSION:   Primary Problem  CYRSTAL (acute kidney injury) [facility])    Active Hospital Problems    Diagnosis Date Noted    Episode of recurrent major depressive disorder ([address] [F33.9] 04/24/2021    Noncompliance [Z91.19] 04/23/2021    Crack cocaine use [F14.90] 04/23/2021    Gastrointestinal stromal tumor (GIST) ([address] [C49. A0]     Hypoglycemia [E16.2]     History of pulmonary embolism [Z86.711]     Right-sided chest wall pain [R07.89] 04/21/2021    CRYSTAL (acute kidney injury) ([address] [N17.9] 04/21/2021    H/O malignant gastrointestinal stromal tumor (GIST) [Z85.09] 04/21/2021    Homeless [Z59.0] 04/04/2021    Pancreatic mass [K86.89] 04/04/2021    Multiple subsegmental pulmonary emboli without acute cor pulmonale (HCC) [I26.94]     Avascular necrosis of left femur ([address] [M87.052] 06/15/2020    Protein calorie malnutrition ([address] [E46] 09/22/2019    History of hepatitis C [Z86.19] 08/11/2019    COPD without exacerbation ([address] [J44.9] 08/11/2019    Hypokalemia [E87.6] 08/11/2019    Avascular necrosis of bone of right hip ([address] Fredderick Camera 08/10/2019    Polysubstance abuse ([address] [F19.10]     Pancreas head lesion [S36.200A] 01/10/2019    Psychosis, intermittent requring antipsychotic meds [F29] 26/69/0700    Alcoholic cirrhosis of liver without ascites ([address] [K70.30]     Chronic heart failure with reduced ejection fraction and diastolic dysfunction ([address] [I50.42] 10/23/2018    Essential hypertension [I10] 10/11/2018    History of substance abuse ([facility] [F19.11] 10/11/2018         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 I personally reviewed results of lab work-up imaging studies and other relevant clinical data. 2. She has GIST , likely gastric, pt is interested in surgery   3. Will discuss with gen surgery. Likely to need hepatobiliary surgeon for resection  Plan placement, we will coordinate care as outpatient,  Izell Hamman to discharge from my standpoint          Mumtaz Pressley MD  Hematologist/Medical [address] [place] [address] hematology oncology physicians            This note is created with the assistance of a speech recognition program.  While intending to generate a document that actually reflects the content of the visit, the document can still have some errors including those of syntax and sound a like substitutions which may escape proof reading. It such instances, actual meaning can be extrapolated by contextual diversion.

## 2021-04-26 PROBLEM — D75.839 THROMBOCYTOSIS: Chronic | Status: RESOLVED | Noted: 2019-08-13 | Resolved: 2021-04-26

## 2021-04-26 PROBLEM — F33.1 MODERATE EPISODE OF RECURRENT MAJOR DEPRESSIVE DISORDER (HCC): Status: ACTIVE | Noted: 2021-04-24

## 2021-04-26 LAB
ANION GAP SERPL CALCULATED.3IONS-SCNC: 13 MMOL/L (ref 9–17)
BUN BLDV-MCNC: 11 MG/DL (ref 8–23)
BUN/CREAT BLD: ABNORMAL (ref 9–20)
CALCIUM SERPL-MCNC: 8 MG/DL (ref 8.6–10.4)
CHLORIDE BLD-SCNC: 108 MMOL/L (ref 98–107)
CO2: 22 MMOL/L (ref 20–31)
CREAT SERPL-MCNC: 0.83 MG/DL (ref 0.5–0.9)
GFR AFRICAN AMERICAN: >60 ML/MIN
GFR NON-AFRICAN AMERICAN: >60 ML/MIN
GFR SERPL CREATININE-BSD FRML MDRD: ABNORMAL ML/MIN/{1.73_M2}
GFR SERPL CREATININE-BSD FRML MDRD: ABNORMAL ML/MIN/{1.73_M2}
GLUCOSE BLD-MCNC: 111 MG/DL (ref 65–105)
GLUCOSE BLD-MCNC: 127 MG/DL (ref 65–105)
GLUCOSE BLD-MCNC: 130 MG/DL (ref 65–105)
GLUCOSE BLD-MCNC: 144 MG/DL (ref 65–105)
GLUCOSE BLD-MCNC: 165 MG/DL (ref 70–99)
HCT VFR BLD CALC: 29.2 % (ref 36.3–47.1)
HEMOGLOBIN: 9.4 G/DL (ref 11.9–15.1)
MCH RBC QN AUTO: 33.2 PG (ref 25.2–33.5)
MCHC RBC AUTO-ENTMCNC: 32.2 G/DL (ref 28.4–34.8)
MCV RBC AUTO: 103.2 FL (ref 82.6–102.9)
NRBC AUTOMATED: 0 PER 100 WBC
PDW BLD-RTO: 14.2 % (ref 11.8–14.4)
PLATELET # BLD: 417 K/UL (ref 138–453)
PMV BLD AUTO: 9.6 FL (ref 8.1–13.5)
POTASSIUM SERPL-SCNC: 3.6 MMOL/L (ref 3.7–5.3)
RBC # BLD: 2.83 M/UL (ref 3.95–5.11)
SODIUM BLD-SCNC: 143 MMOL/L (ref 135–144)
WBC # BLD: 6 K/UL (ref 3.5–11.3)

## 2021-04-26 PROCEDURE — 2580000003 HC RX 258: Performed by: INTERNAL MEDICINE

## 2021-04-26 PROCEDURE — 97110 THERAPEUTIC EXERCISES: CPT

## 2021-04-26 PROCEDURE — 1200000000 HC SEMI PRIVATE

## 2021-04-26 PROCEDURE — 94640 AIRWAY INHALATION TREATMENT: CPT

## 2021-04-26 PROCEDURE — 85027 COMPLETE CBC AUTOMATED: CPT

## 2021-04-26 PROCEDURE — 99232 SBSQ HOSP IP/OBS MODERATE 35: CPT | Performed by: INTERNAL MEDICINE

## 2021-04-26 PROCEDURE — 6370000000 HC RX 637 (ALT 250 FOR IP): Performed by: INTERNAL MEDICINE

## 2021-04-26 PROCEDURE — 99233 SBSQ HOSP IP/OBS HIGH 50: CPT | Performed by: FAMILY MEDICINE

## 2021-04-26 PROCEDURE — 6370000000 HC RX 637 (ALT 250 FOR IP): Performed by: NURSE PRACTITIONER

## 2021-04-26 PROCEDURE — 82947 ASSAY GLUCOSE BLOOD QUANT: CPT

## 2021-04-26 PROCEDURE — 80048 BASIC METABOLIC PNL TOTAL CA: CPT

## 2021-04-26 PROCEDURE — 36415 COLL VENOUS BLD VENIPUNCTURE: CPT

## 2021-04-26 PROCEDURE — 97116 GAIT TRAINING THERAPY: CPT

## 2021-04-26 RX ORDER — LORAZEPAM 1 MG/1
1 TABLET ORAL ONCE
Status: COMPLETED | OUTPATIENT
Start: 2021-04-26 | End: 2021-04-26

## 2021-04-26 RX ADMIN — HYDROCODONE BITARTRATE AND ACETAMINOPHEN 1 TABLET: 5; 325 TABLET ORAL at 06:19

## 2021-04-26 RX ADMIN — ATORVASTATIN CALCIUM 40 MG: 80 TABLET, FILM COATED ORAL at 21:39

## 2021-04-26 RX ADMIN — GABAPENTIN 300 MG: 300 CAPSULE ORAL at 09:05

## 2021-04-26 RX ADMIN — FERROUS SULFATE TAB EC 325 MG (65 MG FE EQUIVALENT) 325 MG: 325 (65 FE) TABLET DELAYED RESPONSE at 09:08

## 2021-04-26 RX ADMIN — APIXABAN 5 MG: 5 TABLET, FILM COATED ORAL at 21:39

## 2021-04-26 RX ADMIN — CARVEDILOL 6.25 MG: 12.5 TABLET, FILM COATED ORAL at 18:11

## 2021-04-26 RX ADMIN — APIXABAN 5 MG: 5 TABLET, FILM COATED ORAL at 13:08

## 2021-04-26 RX ADMIN — HYDROCODONE BITARTRATE AND ACETAMINOPHEN 1 TABLET: 5; 325 TABLET ORAL at 18:11

## 2021-04-26 RX ADMIN — BUTALBITAL, ACETAMINOPHEN AND CAFFEINE 1 TABLET: 50; 325; 40 TABLET ORAL at 21:39

## 2021-04-26 RX ADMIN — BUDESONIDE AND FORMOTEROL FUMARATE DIHYDRATE 2 PUFF: 160; 4.5 AEROSOL RESPIRATORY (INHALATION) at 08:49

## 2021-04-26 RX ADMIN — CLOPIDOGREL 75 MG: 75 TABLET, FILM COATED ORAL at 09:06

## 2021-04-26 RX ADMIN — HYDROCODONE BITARTRATE AND ACETAMINOPHEN 1 TABLET: 5; 325 TABLET ORAL at 13:06

## 2021-04-26 RX ADMIN — SODIUM CHLORIDE: 9 INJECTION, SOLUTION INTRAVENOUS at 06:16

## 2021-04-26 RX ADMIN — HYDROCODONE BITARTRATE AND ACETAMINOPHEN 1 TABLET: 5; 325 TABLET ORAL at 22:59

## 2021-04-26 RX ADMIN — IPRATROPIUM BROMIDE AND ALBUTEROL SULFATE 3 ML: .5; 3 SOLUTION RESPIRATORY (INHALATION) at 16:04

## 2021-04-26 RX ADMIN — GABAPENTIN 300 MG: 300 CAPSULE ORAL at 21:39

## 2021-04-26 RX ADMIN — FAMOTIDINE 20 MG: 20 TABLET, FILM COATED ORAL at 21:39

## 2021-04-26 RX ADMIN — BUDESONIDE AND FORMOTEROL FUMARATE DIHYDRATE 2 PUFF: 160; 4.5 AEROSOL RESPIRATORY (INHALATION) at 21:06

## 2021-04-26 RX ADMIN — FOLIC ACID 1 MG: 1 TABLET ORAL at 09:05

## 2021-04-26 RX ADMIN — CARVEDILOL 6.25 MG: 12.5 TABLET, FILM COATED ORAL at 09:06

## 2021-04-26 RX ADMIN — SODIUM CHLORIDE, PRESERVATIVE FREE 10 ML: 5 INJECTION INTRAVENOUS at 21:42

## 2021-04-26 RX ADMIN — MIRTAZAPINE 30 MG: 30 TABLET, FILM COATED ORAL at 21:39

## 2021-04-26 RX ADMIN — IPRATROPIUM BROMIDE AND ALBUTEROL SULFATE 3 ML: .5; 3 SOLUTION RESPIRATORY (INHALATION) at 08:49

## 2021-04-26 RX ADMIN — BUTALBITAL, ACETAMINOPHEN AND CAFFEINE 1 TABLET: 50; 325; 40 TABLET ORAL at 14:55

## 2021-04-26 RX ADMIN — LORAZEPAM 1 MG: 1 TABLET ORAL at 16:22

## 2021-04-26 RX ADMIN — Medication 1 TABLET: at 09:05

## 2021-04-26 RX ADMIN — BUTALBITAL, ACETAMINOPHEN AND CAFFEINE 1 TABLET: 50; 325; 40 TABLET ORAL at 09:08

## 2021-04-26 RX ADMIN — AMITRIPTYLINE HYDROCHLORIDE 10 MG: 10 TABLET, FILM COATED ORAL at 21:39

## 2021-04-26 RX ADMIN — GABAPENTIN 300 MG: 300 CAPSULE ORAL at 13:36

## 2021-04-26 RX ADMIN — FAMOTIDINE 20 MG: 20 TABLET, FILM COATED ORAL at 09:06

## 2021-04-26 ASSESSMENT — ENCOUNTER SYMPTOMS
COUGH: 1
VOMITING: 0
NAUSEA: 0
ABDOMINAL PAIN: 1

## 2021-04-26 ASSESSMENT — PAIN SCALES - GENERAL
PAINLEVEL_OUTOF10: 4
PAINLEVEL_OUTOF10: 8
PAINLEVEL_OUTOF10: 7
PAINLEVEL_OUTOF10: 7
PAINLEVEL_OUTOF10: 8
PAINLEVEL_OUTOF10: 8

## 2021-04-26 NOTE — DISCHARGE INSTR - COC
disease), lumbar M51.36    Medication management Z79.899    Pancreatic mass K86.89    Homeless Z59.0    Colitis K52.9    Epigastric pain R10.13    Right-sided chest wall pain R07.89    CRYSATL (acute kidney injury) ([facility] N17.9    H/O malignant gastrointestinal stromal tumor (GIST) Z85.09    Gastrointestinal stromal tumor (GIST) ([facility] C49. A0    Hypoglycemia E16.2    History of pulmonary embolism Z86.711    Noncompliance Z91.19    Crack cocaine use F14.90    Moderate episode of recurrent major depressive disorder (HCC) F33.1    Atelectasis J98.11       Isolation/Infection:   Isolation            No Isolation          Patient Infection Status       Infection Onset Added Last Indicated Last Indicated By Review Planned Expiration Resolved Resolved By    None active    Resolved    COVID-19 Rule Out 03/29/21 03/29/21 03/29/21 COVID-19, Rapid (Ordered)   03/29/21 Rule-Out Test Resulted    COVID-19 Rule Out 07/19/20 07/19/20 07/19/20 COVID-19 (Ordered)   07/19/20 Rule-Out Test Resulted    COVID-19 Rule Out 04/24/20 04/24/20 04/24/20 COVID-19 (Ordered)   04/25/20 Rule-Out Test Resulted            Nurse Assessment:  Last Vital Signs: BP (!) 148/72   Pulse 88   Temp 98.5 °F (36.9 °C) (Oral)   Resp 18   Ht 4' 11\" (1.499 m)   Wt 92 lb 3.2 oz (41.8 kg)   LMP 03/18/2015   SpO2 99%   BMI 18.62 kg/m²     Last documented pain score (0-10 scale): Pain Level: 8  Last Weight:   Wt Readings from Last 1 Encounters:   04/25/21 92 lb 3.2 oz (41.8 kg)     Mental Status:  oriented and alert    IV Access:  - None    Nursing Mobility/ADLs:  Walking   Assisted  Transfer  Assisted  Bathing  Assisted  Dressing  Assisted  Toileting  Assisted  Feeding  Independent  Med Admin  Assisted  Med Delivery   whole    Wound Care Documentation and Therapy:  Puncture Hip Anterior; Left (Active)   Wound Assessment Intact 03/25/21 1115   Closure Adhesive bandage 03/25/21 1115   Drainage Amount None 03/25/21 1115   Odor None 03/25/21 1115   Dressing/Treatment signed by Kai Clark RN on 4/27/21 at 11:07 AM EDT    PHYSICIAN SECTION    Prognosis: Fair    Condition at Discharge: Stable    Rehab Potential (if transferring to Rehab): Fair    Recommended Labs or Other Treatments After Discharge:   Discharge planning  Robitussin for cough  Check chest x-ray  Incentive spirometry, atelectasis  Aerosols  Check bun and creatinine  Lisinopril on hold  Pain control  Oncology eval & f/u as scheduled  - GIST, pancreatic mass thrombocytosis in progress Dr Avila Prost service consult, the patient has been homeless  Blood Pressure - Monitor and control   Nutritional supplementation  Correct electrolyte abnormalities  Glycemic contol - monitor and control blood sugars   Hx of PE,  Eliquis  The patient is homeless  She was thrown out of the [employer] for drug use  She no longer has a PCP  APS has been contacted  Encourage compliance  Palliative care consulted  Social Service consult for placement  [facility] referral for surgical treatment of her GIST tumor, suggested [facility]  Precertification in progress  Will discharge when arrangements complete and ok with other services. Follow-up with PCP in one week   Needs repeat RFP in one week    Physician Certification: I certify the above information and transfer of Emi Daniels  is necessary for the continuing treatment of the diagnosis listed and that she requires [facility] for less 30 days.      Update Admission H&P:   Principal Problem:    CRYSTAL (acute kidney injury) ([address]  Active Problems:    Essential hypertension    History of substance abuse ([address]    Chronic heart failure with reduced ejection fraction and diastolic dysfunction (HCC)    Alcoholic cirrhosis of liver without ascites (HCC)    Pancreas head lesion    Polysubstance abuse ([address]    Avascular necrosis of bone of right hip (HCC)    COPD without exacerbation (HCC)    Hypokalemia    History of hepatitis C    Protein calorie malnutrition ([address]    Avascular necrosis of left femur (HCC)    Multiple subsegmental pulmonary emboli without acute cor pulmonale (HCC)    Pancreatic mass    Homeless    Right-sided chest wall pain    H/O malignant gastrointestinal stromal tumor (GIST)    Gastrointestinal stromal tumor (GIST) (HCC)    Hypoglycemia    History of pulmonary embolism    Noncompliance    Crack cocaine use    Moderate episode of recurrent major depressive disorder ([facility]    Atelectasis  Resolved Problems:    * No resolved hospital problems.  *     PHYSICIAN SIGNATURE:  Electronically signed by Frandy Cunningham DO on 4/27/21 at 11:14 AM EDT

## 2021-04-26 NOTE — PROGRESS NOTES
Occupational Therapy    Occupational Therapy Not Seen Note    DATE: 2021  Name: Melissa Jon  : 1955  MRN: [id number]    Patient not available for Occupational Therapy due to:    Patient Declined: after max TIM encouragement. Pt declined sec to increased SOB and fatigue, education provided on importance of sitting up for SOB pt declined.     Next Scheduled Treatment: Attempt back if time allotted    Electronically signed by VERONIQUE Ruiz on 2021 at 9:23 AM

## 2021-04-26 NOTE — CARE COORDINATION
Received call from APS. Pt had a past case however APS case has been closed. Reviewed chart and noted that pt is awaiting precert for admission to [facility] [place]. Pt was seen by psychiatry and denied need for voluntary admission to [facility] and plans on discharging to SNF.

## 2021-04-26 NOTE — PROGRESS NOTES
[facility]    Office: [address], DO, [place], DO, Trav Hollie, DO, Emeterio Dhillon Blood, DO, Sharonda Rivers MD, Aniceto Muir MD, Orquidea Bueno MD, Carmina Mixon MD, Barbara Menezes MD, Sanjuana Tinsley MD, Amy Bejarano MD, Martha To MD, Jenelle Shaw MD, Norah Dimas DO, Krystian Rosas MD, Chloe Valentino MD, Bayron Duarte DO, Maddi Riley MD,  Jeny Arriaga DO, Madan Easton MD, Neetu Iverson MD, Varinder Chavez, [facility], [address], [facility], Darell Bolanos, CNP, Calvin Montenegro, CNS, Alfredo Osgood, CNP, Caitlin Alert, CNP, Velvet Barber, CNP, Jose Hdz, CNP, Morris Covington, CNP, Cori Tanner PA-C, Carleen Isaacs, DUNG, Hung Wellington, CNP, Hari Woodson, CNP, Tomi Ford, CNP, Bea Grajeda, CNP, Kerry Manzo, [facility]   [facility]    Progress Note    4/26/2021    12:03 PM    Name:   Janet Downs  MRN:     [id number]     Acct:      [de-identified]   Room:   [address] [place] [address] Day:  5  Admit Date:  4/21/2021 11:00 AM    PCP:   No primary care provider on file. Code Status:  Full Code    Subjective:     C/C:   Chief Complaint   Patient presents with    Abdominal Pain    Chest Pain    Headache       Interval History Status:  Quite weak  Still using bedside commode so she does not have to ambulate to bathroom  Dyspnea on exertion  Right-sided and epigastric abdominal pain persists    Data Base Updates:  Blood sugars have been stable  [id number]     Awaiting placement    Brief History:     As documented in the medical record:  \"Emi Daniels is a 74 y.o. female with past medical history for COPD, diastolic heart failure, depression, chronic hepatitis, homelessness presents to the emergency department for right-sided chest pain and anxiety. Patient was previously seen earlier this month for a pancreatic mass which was biopsied.   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 She is recommended follow-up with general surgery which was supposed to be today. She states she has been so anxious about her diagnosis that she start developing chest pain and shortness of breath. She is unable to go to her appointment today. She states she has difficulty with follow-up due to scheduling issue, does not recall having a conversation with gastroenterology even though this conversation had been charted. During my examination she has reproducible chest wall pain, shortness of breath is improved. She had episode of hypoglycemia while in the emergency department and was given D50, she was also noted to have an CRYSTAL and was admitted for this. General surgery was consulted for recommendations regarding chest, patient is homeless, may not follow-up outpatient. The initial assessment and plan included:  Acute kidney Injury -creatinine 1.46 with a baseline 0.54,  start IV fluids, hold lisinopril. CT abdomen pelvis showing no obstruction. Could be from hypotension versus ACE inhibitor    Right-sided chest pain -chronic issue, did not think ACS. Worsens with palpation and deep inspiration, pain management    Homelessness -patient readmitted after recent discharge, unable to follow-up with appointments. Consult social work for recommendations as patient seems unable to take care of herself    GIST -gastroenterology seems to have contacted her regarding results with patient have no recollection. Patient is homeless, unable to follow-up outpatient and seems to have poor memory. Will consult general surgery to see if resection could possibly be performed inpatient    She had EUS with Bx  Surgical Pathology Report 04/05/2021  1:17  [address]   -- Diagnosis --   PANCREATIC LESION, EUS GUIDED FNB:          GASTROINTESTINAL STROMAL TUMOR (GIST).       Hypoglycemia -given amp of D50, continue to monitor. Severe protein calorie malnutritionlikely related to her homelessness  COPD  CVA  Tobacco abuse  Essential hypertension  Chronic hepatitis C\"     Troponin, High [id number]     Results for Carolyn Boyce (MRN [id number]) as of 4/22/2021 14:15   Ref. Range 10/20/2020 17:50 4/3/2021 19:33 4/4/2021 19:06 4/6/2021 08:37 4/21/2021 11:21   Creatinine Latest Ref Range: 0.50 - 0.90 mg/dL 0.92 (H) 0.56 0.64 0.54 1.46 (H)     MRI 1/2020  Chronic microvascular disease with a focus of restricted diffusion in the   left posterior frontal lobe consistent with acute/subacute ischemia.       Mild chronic sinusitis in the maxillary sinuses. CT abdomen 4/21/2021  No acute abdominal or pelvic abnormality.       Redemonstration of a peripherally enhancing mass in the pancreatic body that   is not significantly changed compared to prior examination. The patient's condition was stabilized  Discharge planning initiated    Medications: Allergies:     Allergies   Allergen Reactions    Aspirin Itching    Keflet [Cephalexin]      Tolerated penicillin V 12/13, tolerates cephalexin 6/14       Current Meds:   Scheduled Meds:    lidocaine 1 % injection  5 mL Intradermal Once    sodium chloride flush  5-40 mL Intravenous 2 times per day    amitriptyline  10 mg Oral Nightly    atorvastatin  40 mg Oral Nightly    carvedilol  6.25 mg Oral BID WC    clopidogrel  75 mg Oral Daily    famotidine  20 mg Oral BID    ferrous sulfate  325 mg Oral Daily    folic acid  1 mg Oral Daily    gabapentin  300 mg Oral TID    mirtazapine  30 mg Oral Nightly    budesonide-formoterol  2 puff Inhalation BID    therapeutic multivitamin-minerals  1 tablet Oral Daily    sodium chloride flush  5-40 mL Intravenous 2 times per day     Continuous Infusions:    sodium chloride      sodium chloride      dextrose      sodium chloride 100 mL/hr at 04/26/21 0616    heparin (PORCINE) Infusion Stopped (04/22/21 0050)     PRN Meds: dextromethorphan-guaiFENesin, butalbital-acetaminophen-caffeine, sodium chloride flush, sodium chloride, HYDROcodone 5 mg - acetaminophen, ipratropium-albuterol, sodium chloride flush, sodium chloride, potassium chloride **OR** potassium alternative oral replacement **OR** potassium chloride, magnesium sulfate, promethazine **OR** ondansetron, polyethylene glycol, acetaminophen **OR** acetaminophen, hydrOXYzine, glucose, dextrose, glucagon (rDNA), dextrose, heparin (porcine), heparin (porcine)    Data:     Past Medical History:   has a past medical history of Appendicitis, Cerebral artery occlusion with cerebral infarction ([address] CHF (congestive heart failure) ([address] Chronic respiratory failure with hypoxia and hypercapnia (HCC), Chronic rhinitis, Cigarette smoker, COPD (chronic obstructive pulmonary disease) ([address] Depression, Dysphagia, Essential hypertension, GERD (gastroesophageal reflux disease), Heart failure, diastolic, with acute decompensation ([address] Hepatitis C, chronic ([address] History of smoking at least 1 pack per day for at least 30 years, Hyperlipidemia, Hypertension, Migraine, Moderate COPD (chronic obstructive pulmonary disease) ([address] Multiple transfusions, Neurogenic dysphagia, Osteoarthritis, Pneumonia, Scoliosis, and Substance abuse ([address] Social History:   reports that she quit smoking about 2 years ago. Her smoking use included cigarettes. She started smoking about 51 years ago. She has a 40.00 pack-year smoking history. She has never used smokeless tobacco. She reports that she does not drink alcohol or use drugs. Family History:   Family History   Problem Relation Age of Onset    Breast Cancer Mother     Heart Disease Father        Review of Systems:     Review of Systems   Constitutional: Positive for activity change (Diminished), appetite change (Decreased) and fatigue (Exercise capacity is poor).    Respiratory: Positive for cough (cough, occasional white sputum). Cardiovascular: Positive for chest pain (Right rib pain). Negative for palpitations. Gastrointestinal: Positive for abdominal pain (Chronic right-sided rib and abdominal pain). Negative for nausea and vomiting. Genitourinary: Negative for flank pain and hematuria. Dysuria: TSAI. Musculoskeletal: Positive for arthralgias, gait problem (unsteady ) and myalgias. The patient has chronic arthralgias and myalgias    Neurological: Positive for weakness (Generalized). Psychiatric/Behavioral: Negative for agitation and confusion. The patient is nervous/anxious (Still nervous and anxious at times). Physical Examination:        Physical Exam  Vitals signs reviewed. Constitutional:       General: She is not in acute distress. Appearance: She is not diaphoretic. HENT:      Head: Normocephalic. Nose: Nose normal.      Mouth/Throat:      Comments: Dentition poor  Eyes:      General: No scleral icterus. Conjunctiva/sclera: Conjunctivae normal.   Neck:      Musculoskeletal: Neck supple. Trachea: No tracheal deviation. Cardiovascular:      Rate and Rhythm: Normal rate and regular rhythm. Pulmonary:      Effort: Pulmonary effort is normal. No respiratory distress. Breath sounds: Rhonchi present. No wheezing or rales. Chest:      Chest wall: No tenderness. Abdominal:      General: Bowel sounds are normal. There is no distension. Palpations: Abdomen is soft. Tenderness: There is no abdominal tenderness. There is no guarding or rebound. Musculoskeletal:         General: No tenderness. Comments: Muscle mass decreased   Skin:     General: Skin is warm and dry. Neurological:      Mental Status: She is alert.    Psychiatric:      Comments: Less agitated  Thoughts better organized       Vitals:  BP (!) 148/72   Pulse 88   Temp 98.5 °F (36.9 °C) (Oral)   Resp 18   Ht 4' 11\" (1.499 m)   Wt 92 lb 3.2 oz (41.8 kg)   LMP 03/18/2015   SpO2 99%   BMI 18.62 kg/m²   Temp (24hrs), Av.3 °F (36.8 °C), Min:98 °F (36.7 °C), Max:98.5 °F (36.9 °C)    Recent Labs     21  1620 21  0808 21  1150   POCGLU 141* 123* 111* 130*       I/O (24Hr): Intake/Output Summary (Last 24 hours) at 2021 1203  Last data filed at 2021 0616  Gross per 24 hour   Intake 1600 ml   Output    Net 1600 ml       Labs:  Hematology:  Recent Labs     21  0651   WBC 5.0   RBC 2.97*   HGB 9.6*   HCT 31.7*   .7*   MCH 32.3   MCHC 30.3   RDW 13.8      MPV 9.1     Chemistry:  Recent Labs     21  0651   PROBNP 672*     Recent Labs     21  0752 21  1124 21  1620 21  0808 21  1150   POCGLU 107* 135* 141* 123* 111* 130*     ABG:  Lab Results   Component Value Date    POCPH 7.366 2019    POCPCO2 51.0 2019    POCPO2 65.9 2019    POCHCO3 29.2 2019    NBEA NOT REPORTED 2019    PBEA 3 2019    XRF0VRA 31 2019    PCGG0VPR 92 2019    FIO2 NOT REPORTED 2019     Lab Results   Component Value Date/Time    SPECIAL NOT REPORTED 2021 12:09 AM     Lab Results   Component Value Date/Time    CULTURE NO SIGNIFICANT GROWTH 2021 12:09 AM       Radiology:  Ct Abdomen Pelvis W Iv Contrast Additional Contrast? None    Result Date: 2021  No acute abdominal or pelvic abnormality. Redemonstration of a peripherally enhancing mass in the pancreatic body that is not significantly changed compared to prior examination.          Assessment:        Principal Problem:    CRYSTAL (acute kidney injury) ([facility]  Active Problems:    Essential hypertension    History of substance abuse ([facility]    Chronic heart failure with reduced ejection fraction and diastolic dysfunction (HCC)    Alcoholic cirrhosis of liver without ascites (HCC)    Pancreas head lesion    Polysubstance abuse ([address]    Avascular necrosis of bone of right hip (HCC)    COPD without exacerbation ([address] Hypokalemia    History of hepatitis C    Protein calorie malnutrition (HCC)    Avascular necrosis of left femur (HCC)    Multiple subsegmental pulmonary emboli without acute cor pulmonale (HCC)    Pancreatic mass    Homeless    Right-sided chest wall pain    H/O malignant gastrointestinal stromal tumor (GIST)    Gastrointestinal stromal tumor (GIST) (HCC)    Hypoglycemia    History of pulmonary embolism    Noncompliance    Crack cocaine use    Moderate episode of recurrent major depressive disorder ([address]    Atelectasis  Resolved Problems:    * No resolved hospital problems. *      Plan:        Discharge planning  Robitussin for cough  Check chest x-ray  Incentive spirometry, atelectasis  Aerosols  Check bun and creatinine  Lisinopril on hold  Pain control  Oncology eval & f/u as scheduled  - GIST, pancreatic mass thrombocytosis in progress Dr Yuli Chase service consult, the patient has been homeless  Blood Pressure - Monitor and control   Nutritional supplementation  Correct electrolyte abnormalities  Trend troponins, doubt non-ST elevation MI  Glycemic contol - monitor and control blood sugars   Hx of PE last July, Heparin initiated  Eliquis upon discharge  The patient is homeless  She was thrown out of the [facility] for drug use  She no longer has a PCP  APS has been contacted  Encourage compliance  Palliative care consulted  Service consult for placement  [facility] referral for surgical treatment of her GIST tumor  Suggested [facility]  Precertification in progress  Will discharge when arrangements complete and ok with other services.   Follow-up with PCP in one week   Notify PCP of discharge   Med rec done  SKINNY done  DCP 37 min+    IP CONSULT TO HOSPITALIST  IP CONSULT TO SOCIAL WORK  IP CONSULT TO GENERAL SURGERY  IP CONSULT TO IV TEAM  IP CONSULT TO HEM/ONC  IP CONSULT TO PSYCHIATRY  IP CONSULT TO PALLIATIVE CARE  CHEMICAL DEPENDENCY REFERRAL FOR SOCIAL SERVICE CONSULT  IP CONSULT TO SOCIAL WORK  IP CONSULT TO [facility],   4/26/2021  12:03 PM

## 2021-04-26 NOTE — CARE COORDINATION
Transition planning  Gabrielle Pressley at [facility] and she relates still reviewing was delayed due to pts not working with therapy . Will let me know as soon as she hears .

## 2021-04-26 NOTE — PROGRESS NOTES
Today's Date: 4/26/2021  Patient Name: Miguel Blandon  Date of admission: 4/21/2021 11:00 AM  Patient's age: 74 y.o., 1955  Admission Dx: Right-sided chest wall pain [R07.89]  Acute kidney injury ([address] [N17.9]    Reason for Consult: management recommendations  Requesting Physician: Fadumo Son DO    CHIEF COMPLAINT: Abdominal pain. Nausea vomiting. Weight loss. History Obtained From:  patient, electronic medical record    Interval history:    Patient seen and examined  Labs and vitals reviewed  She was short of breath and receiving updraft treatment. She is quite anxious and we will give her a dose of Ativan    HISTORY OF PRESENT ILLNESS:      The patient is a 74 y.o.  female who presents with a emergency department with chief complaint of chest pain. Patient was recently diagnosed with just tumor on endoscopy performed on 4/5/2021. Patient was recently hospitalized with complaints of nausea and vomiting. Underwent CT scan which showed a 2.3 cm lesion arising from the mid pancreatic body. Patient underwent EGD with EUS EGD showed a small hiatal hernia otherwise was unremarkable. EUS showed lesion in the pancreatic body 2.3 x 1.8 mm well-defined borders. 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 Pathology report does not mention proliferative index. Patient again had a CT scan done in the ER which showed mildly distended stomach and duodenum. Did not show any obstruction. Patient does complain of weight loss over the last couple of months and recently had difficulties eating due to complaining of nausea. The tumor in the pancreas does not seem to cause any obstruction. Patient has been evaluated by surgery who are recommending low residue diet.   Patient's BMI is 19      Past Medical History:   has a past medical history of Appendicitis, Cerebral artery occlusion with cerebral infarction ([address] CHF (congestive heart failure) ([address] Chronic respiratory failure with hypoxia and hypercapnia ([address] Chronic rhinitis, Cigarette smoker, COPD (chronic obstructive pulmonary disease) ([address] Depression, Dysphagia, Essential hypertension, GERD (gastroesophageal reflux disease), Heart failure, diastolic, with acute decompensation ([address] Hepatitis C, chronic ([address] History of smoking at least 1 pack per day for at least 30 years, Hyperlipidemia, Hypertension, Migraine, Moderate COPD (chronic obstructive pulmonary disease) ([address] Multiple transfusions, Neurogenic dysphagia, Osteoarthritis, Pneumonia, Scoliosis, and Substance abuse ([address] Past Surgical History:   has a past surgical history that includes  section; LEEP (); Upper gastrointestinal endoscopy (); Gastric bypass surgery; Appendectomy (2017); laparoscopic appendectomy (N/A, 3/27/2017); Upper gastrointestinal endoscopy (2018); Upper gastrointestinal endoscopy (N/A, 2018); pr colonoscopy w/biopsy single/multiple (N/A, 11/10/2018); pr egd transoral biopsy single/multiple (N/A, 2018); hip surgery (Right, 2019); Total hip arthroplasty (Right, 2019); Medication Injection (Left, 3/25/2021); and Upper gastrointestinal endoscopy (N/A, 2021). Medications:    Prior to Admission medications    Medication Sig Start Date End Date Taking? Authorizing Provider   famotidine (PEPCID) 20 MG tablet Take 1 tablet by mouth 2 times daily 21   Ramiro Phoenix, DO   mometasone-formoterol [facility]) 100-5 MCG/ACT inhaler Inhale 2 puffs into the lungs 2 times daily 21   Ramiro Phoenix, DO   mirtazapine (REMERON) 30 MG tablet Take 1 tablet by mouth nightly 21   Ramiro Phoenix, DO   lisinopril (PRINIVIL;ZESTRIL) 10 MG tablet Take 1 tablet by mouth daily 21   Ramiro Phoenix, DO   gabapentin (NEURONTIN) 300 MG capsule Take 1 capsule by mouth 3 times daily for 30 days.  21  Ramiro Phoenix DO   ferrous sulfate (MELISSA-HAO) 325 (65 Fe) MG tablet Take 1 tablet by mouth daily 21   Ramiro Phoenix DO clopidogrel (PLAVIX) 75 MG tablet Take 1 tablet by mouth daily 4/6/21   [facility] Law[facility], DO   carvedilol (COREG) 6.25 MG tablet Take 1 tablet by mouth 2 times daily (with meals) 4/6/21   [facility], DO   atorvastatin (LIPITOR) 40 MG tablet Take 1 tablet by mouth nightly 4/6/21   [facility], [facility]   apixaban (ELIQUIS) 5 MG TABS tablet Take 1 tablet by mouth 2 times daily 4/6/21   [facility], DO   amitriptyline (ELAVIL) 10 MG tablet Take 1 tablet by mouth nightly 4/6/21   [facility], DO   albuterol sulfate HFA (PROVENTIL HFA) 108 (90 Base) MCG/ACT inhaler Inhale 1-2 puffs into the lungs once for 1 dose 4/6/21 4/6/21  [facility] Matos, DO   butalbital-acetaminophen-caffeine (FIORICET, ESGIC) -49 MG per tablet Take 1 tablet by mouth every 12 hours for 5 doses 3/28/21 3/31/21  Clevester Cogan, MD   Elastic Bandages & Supports (LUMBAR BACK BRACE/SUPPORT PAD) MISC 1 Units by Does not apply route daily 2/10/21 2/10/22  Queen Guille MD   amLODIPine (NORVASC) 10 MG tablet Take 1 tablet by mouth daily 2/9/21 2/9/21  Haylie Anderson MD   Sennosides (SENNA) 8.6 MG CAPS 8.6 capsules 8/2/20   Ramon Dye MD   [facility] [place]  Devices [employer]) MISC 1 Device by Does not apply route once for 1 dose 8/5/19 8/5/19  Danielle Lyons PA-C   ipratropium-albuterol (DUONEB) 0.5-2.5 (3) MG/3ML SOLN nebulizer solution Inhale 3 mLs into the lungs every 4 hours as needed for Shortness of Breath 1/11/19   Ventura Reece MD   folic acid (FOLVITE) 1 MG tablet Take 1 tablet by mouth daily 10/13/18   Oriana Coon MD   vitamin B-1 100 MG tablet Take 1 tablet by mouth daily 10/13/18   Oriana Coon MD   vitamin B-12 250 MCG tablet Take 1 tablet by mouth daily 10/13/18   Oriana Coon MD   Multiple Vitamins-Minerals (THERAPEUTIC MULTIVITAMIN-MINERALS) tablet Take 1 tablet by mouth daily 8/12/18   Susan Shukla MD     Current Facility-Administered Medications   Medication Dose Route Frequency Provider Last Rate Last Admin    apixaban (ELIQUIS) tablet 5 mg  5 mg Oral BID Ty Norris, DO   5 mg at 04/26/21 1308    dextromethorphan-guaiFENesin (ROBITUSSIN-DM)  MG/5ML liquid 10 mL  10 mL Oral Q4H PRN Ty Norris, DO        butalbital-acetaminophen-caffeine (FIORICET, [facility]) per tablet 1 tablet  1 tablet Oral Q6H PRN Ty Adalgisa, DO   1 tablet at 04/26/21 1455    lidocaine 1 % injection 5 mL  5 mL Intradermal Once Ty Adalgisa, DO   Stopped at 04/22/21 8628    sodium chloride flush 0.9 % injection 5-40 mL  5-40 mL Intravenous 2 times per day Ty Norris, DO   5 mL at 04/24/21 0951    sodium chloride flush 0.9 % injection 5-40 mL  5-40 mL Intravenous PRN Ty Norris, DO        0.9 % sodium chloride infusion  25 mL Intravenous PRN Ty Norris, DO        HYDROcodone-acetaminophen [facility]) 5-325 MG per tablet 1 tablet  1 tablet Oral Q4H PRN Ty Norris, DO   1 tablet at 04/26/21 1306    amitriptyline (ELAVIL) tablet 10 mg  10 mg Oral Nightly JAYDON Miller - NP   10 mg at 04/25/21 2048    atorvastatin (LIPITOR) tablet 40 mg  40 mg Oral Nightly Pako Anderson APRN - NP   40 mg at 04/25/21 2048    carvedilol (COREG) tablet 6.25 mg  6.25 mg Oral BID  JAYDON Miller - NP   6.25 mg at 04/26/21 0906    clopidogrel (PLAVIX) tablet 75 mg  75 mg Oral Daily Pako Anderson APRN - NP   75 mg at 04/26/21 0906    famotidine (PEPCID) tablet 20 mg  20 mg Oral BID JAYDON Miller - NP   20 mg at 04/26/21 0906    ferrous sulfate (FE TABS 325) EC tablet 325 mg  325 mg Oral Daily JAYDON Miller - NP   325 mg at 61/01/66 6643    folic acid (FOLVITE) tablet 1 mg  1 mg Oral Daily JAYDON Miller NP   1 mg at 04/26/21 2128    gabapentin (NEURONTIN) capsule 300 mg  300 mg Oral TID JAYDON Miller - NP   300 mg at 04/26/21 1336    ipratropium-albuterol (DUONEB) nebulizer solution 3 mL  1 vial Inhalation Q4H PRN Pako Anderson APRN - NP   3 mL at 04/26/21 1604    mirtazapine (REMERON) tablet 30 mg  30 mg Oral Nightly JAYDON Pratt - NP   30 mg at 04/25/21 2049    budesonide-formoterol (SYMBICORT) 160-4.5 MCG/ACT inhaler 2 puff  2 puff Inhalation BID JAYDON Pratt - CHRISTIANA   2 puff at 04/26/21 0849    therapeutic multivitamin-minerals 1 tablet  1 tablet Oral Daily JAYDON Pratt - NP   1 tablet at 04/26/21 0905    sodium chloride flush 0.9 % injection 5-40 mL  5-40 mL Intravenous 2 times per day JAYDON Pratt - NP   10 mL at 04/25/21 6195    sodium chloride flush 0.9 % injection 10 mL  10 mL Intravenous PRN JAYDON Pratt - NP        0.9 % sodium chloride infusion  25 mL Intravenous PRN JAYDON Pratt - NP        potassium chloride (KLOR-CON M) extended release tablet 40 mEq  40 mEq Oral PRN JAYDON Pratt NP        Or   Alexandra Dhillon potassium bicarb-citric acid (EFFER-K) effervescent tablet 40 mEq  40 mEq Oral PRN JAYDON Pratt - CHRISTIANA        Or   Alexandra Dhillon potassium chloride 10 mEq/100 mL IVPB (Peripheral Line)  10 mEq Intravenous PRN JAYDON Pratt NP        magnesium sulfate 1000 mg in dextrose 5% 100 mL IVPB  1,000 mg Intravenous PRN Kori Rivera APRN - NP        promethazine (PHENERGAN) tablet 12.5 mg  12.5 mg Oral Q6H PRN JAYDON Pratt - NP   12.5 mg at 04/24/21 2155    Or    ondansetron (ZOFRAN) injection 4 mg  4 mg Intravenous Q6H PRN JAYDON Pratt - NP   4 mg at 04/23/21 2042    polyethylene glycol (GLYCOLAX) packet 17 g  17 g Oral Daily PRN JAYDON Pratt - CHRISTIANA        acetaminophen (TYLENOL) tablet 650 mg  650 mg Oral Q6H PRN JAYDON Pratt - CHRISTIANA        Or   Alexandra Dhillon acetaminophen (TYLENOL) suppository 650 mg  650 mg Rectal Q6H PRN Kori Rivera APRN - CHRISTIANA        hydrOXYzine (ATARAX) tablet 25 mg  25 mg Oral TID PRN JAYDON Pratt NP   25 mg at 04/22/21 2103    glucose (GLUTOSE) 40 % oral gel 15 g  15 g Oral PRN JAYDON Pratt NP        dextrose 50 % IV solution  12.5 g Intravenous PRN [place]vel[place], APRN - NP        glucagon (rDNA) injection 1 mg  1 mg Intramuscular PRN Linda Bevels, APRN - NP        dextrose 5 % solution  100 mL/hr Intravenous PRN Linda Bevels, APRN - NP        0.9 % sodium chloride infusion   Intravenous Continuous [place] Prophet,  mL/hr at 04/26/21 0616 New Bag at 04/26/21 0616    heparin (porcine) injection 3,430 Units  80 Units/kg Intravenous PRN Chano Prophet, DO        heparin (porcine) injection 1,720 Units  40 Units/kg Intravenous PRN Chano Prophet, DO           Allergies:  Aspirin and Keflet [cephalexin]    Social History:   reports that she quit smoking about 2 years ago. Her smoking use included cigarettes. She started smoking about 51 years ago. She has a 40.00 pack-year smoking history. She has never used smokeless tobacco. She reports that she does not drink alcohol or use drugs. Family History: family history includes Breast Cancer in her mother; Heart Disease in her father. REVIEW OF SYSTEMS:      Constitutional: No fever or chills. No night sweats, positive weight loss. Eyes: No eye discharge, double vision, or eye pain   HEENT: negative for sore mouth, sore throat, hoarseness and voice change   Respiratory: negative for cough , sputum, dyspnea, wheezing, hemoptysis, chest pain   Cardiovascular: negative for chest pain, dyspnea, palpitations, orthopnea, PND   Gastrointestinal: Positive abdominal pain  Genitourinary: negative for frequency, dysuria, nocturia, urinary incontinence, and hematuria   Integument: negative for rash, skin lesions, bruises. Hematologic/Lymphatic: negative for easy bruising, bleeding, lymphadenopathy, or petechiae   Endocrine: negative for heat or cold intolerance,weight changes, change in bowel habits and hair loss   Musculoskeletal: negative for myalgias, arthralgias, pain, joint swelling,and bone pain   Neurological: negative for headaches, dizziness, seizures, weakness, numbness. Positive for memory. PHYSICAL EXAM:        BP (!) 148/72   Pulse 88   Temp 98.5 °F (36.9 °C) (Oral)   Resp 18   Ht 4' 11\" (1.499 m)   Wt 92 lb 3.2 oz (41.8 kg)   LMP 2015   SpO2 99%   BMI 18.62 kg/m²    Temp (24hrs), Av.3 °F (36.8 °C), Min:98 °F (36.7 °C), Max:98.5 °F (36.9 °C)      General appearance -frail appearing, no in pain or distress   Mental status - alert and cooperative   Eyes - pupils equal and reactive, extraocular eye movements intact   Ears - bilateral TM's and external ear canals normal   Mouth - mucous membranes moist, pharynx normal without lesions   Neck - supple, no significant adenopathy   Lymphatics - no palpable lymphadenopathy, no hepatosplenomegaly   Chest - clear to auscultation, no wheezes, rales or rhonchi, symmetric air entry   Heart - normal rate, regular rhythm, normal S1, S2, no murmurs  Abdomen -soft. Nondistended.   Positive tender  Neurological - alert, oriented, normal speech, no focal findings or movement disorder noted   Musculoskeletal - no joint tenderness, deformity or swelling   Extremities - peripheral pulses normal, no pedal edema, no clubbing or cyanosis   Skin - normal coloration and turgor, no rashes, no suspicious skin lesions noted ,      DATA:      Labs:     Results for orders placed or performed during the hospital encounter of 21   CBC   Result Value Ref Range    WBC 5.1 3.5 - 11.3 k/uL    RBC 4.24 3.95 - 5.11 m/uL    Hemoglobin 13.7 11.9 - 15.1 g/dL    Hematocrit 42.2 36.3 - 47.1 %    MCV 99.5 82.6 - 102.9 fL    MCH 32.3 25.2 - 33.5 pg    MCHC 32.5 28.4 - 34.8 g/dL    RDW 13.3 11.8 - 14.4 %    Platelets 495 (H) 923 - 453 k/uL    MPV 9.0 8.1 - 13.5 fL    NRBC Automated 0.0 0.0 per 100 WBC   COMPREHENSIVE METABOLIC PANEL   Result Value Ref Range    Glucose 46 (L) 70 - 99 mg/dL    BUN 23 8 - 23 mg/dL    CREATININE 1.46 (H) 0.50 - 0.90 mg/dL    Bun/Cre Ratio NOT REPORTED 9 - 20    Calcium 9.3 8.6 - 10.4 mg/dL    Sodium 139 135 - 144 mmol/L    Potassium 3.6 (L) 3.7 0 - 35 U/mL   CBC   Result Value Ref Range    WBC 4.4 3.5 - 11.3 k/uL    RBC 3.08 (L) 3.95 - 5.11 m/uL    Hemoglobin 9.9 (L) 11.9 - 15.1 g/dL    Hematocrit 30.9 (L) 36.3 - 47.1 %    .3 82.6 - 102.9 fL    MCH 32.1 25.2 - 33.5 pg    MCHC 32.0 28.4 - 34.8 g/dL    RDW 13.9 11.8 - 14.4 %    Platelets 956 (H) 850 - 453 k/uL    MPV 9.1 8.1 - 13.5 fL    NRBC Automated 0.0 0.0 per 100 WBC   Renal Function Panel   Result Value Ref Range    Glucose 96 70 - 99 mg/dL    BUN 15 8 - 23 mg/dL    CREATININE 1.12 (H) 0.50 - 0.90 mg/dL    Bun/Cre Ratio NOT REPORTED 9 - 20    Calcium 7.8 (L) 8.6 - 10.4 mg/dL    Albumin 3.2 (L) 3.5 - 5.2 g/dL    Phosphorus 2.4 (L) 2.6 - 4.5 mg/dL    Sodium 142 135 - 144 mmol/L    Potassium 4.0 3.7 - 5.3 mmol/L    Chloride 115 (H) 98 - 107 mmol/L    CO2 19 (L) 20 - 31 mmol/L    Anion Gap 8 (L) 9 - 17 mmol/L    GFR Non-African American 48 (L) >60 mL/min    GFR  59 (L) >60 mL/min    GFR Comment          GFR Staging NOT REPORTED    Magnesium   Result Value Ref Range    Magnesium 1.2 (L) 1.6 - 2.6 mg/dL   AFP Tumor Marker   Result Value Ref Range    AFP (Alpha Fetoprotein) 3.0 <8.4 ug/L   CBC   Result Value Ref Range    WBC 5.0 3.5 - 11.3 k/uL    RBC 2.97 (L) 3.95 - 5.11 m/uL    Hemoglobin 9.6 (L) 11.9 - 15.1 g/dL    Hematocrit 31.7 (L) 36.3 - 47.1 %    .7 (H) 82.6 - 102.9 fL    MCH 32.3 25.2 - 33.5 pg    MCHC 30.3 28.4 - 34.8 g/dL    RDW 13.8 11.8 - 14.4 %    Platelets 608 552 - 999 k/uL    MPV 9.1 8.1 - 13.5 fL    NRBC Automated 0.0 0.0 per 100 WBC   Brain Natriuretic Peptide   Result Value Ref Range    Pro- (H) <300 pg/mL    BNP Interpretation Pro-BNP Reference Range:    CBC   Result Value Ref Range    WBC 6.0 3.5 - 11.3 k/uL    RBC 2.83 (L) 3.95 - 5.11 m/uL    Hemoglobin 9.4 (L) 11.9 - 15.1 g/dL    Hematocrit 29.2 (L) 36.3 - 47.1 %    .2 (H) 82.6 - 102.9 fL    MCH 33.2 25.2 - 33.5 pg    MCHC 32.2 28.4 - 34.8 g/dL    RDW 14.2 11.8 - 14.4 %    Platelets 271 236 - 453 k/uL    MPV 9.6 8.1 - 13.5 fL    NRBC Automated 0.0 0.0 per 100 WBC   Basic Metabolic Panel   Result Value Ref Range    Glucose 165 (H) 70 - 99 mg/dL    BUN 11 8 - 23 mg/dL    CREATININE 0.83 0.50 - 0.90 mg/dL    Bun/Cre Ratio NOT REPORTED 9 - 20    Calcium 8.0 (L) 8.6 - 10.4 mg/dL    Sodium 143 135 - 144 mmol/L    Potassium 3.6 (L) 3.7 - 5.3 mmol/L    Chloride 108 (H) 98 - 107 mmol/L    CO2 22 20 - 31 mmol/L    Anion Gap 13 9 - 17 mmol/L    GFR Non-African American >60 >60 mL/min    GFR African American >60 >60 mL/min    GFR Comment          GFR Staging NOT REPORTED    POC Glucose Fingerstick   Result Value Ref Range    POC Glucose 37 (LL) 65 - 105 mg/dL   POC Glucose Fingerstick   Result Value Ref Range    POC Glucose 214 (H) 65 - 105 mg/dL   POC Glucose Fingerstick   Result Value Ref Range    POC Glucose 171 (H) 65 - 105 mg/dL   POC Glucose Fingerstick   Result Value Ref Range    POC Glucose 113 (H) 65 - 105 mg/dL   POC Glucose Fingerstick   Result Value Ref Range    POC Glucose 127 (H) 65 - 105 mg/dL   POC Glucose Fingerstick   Result Value Ref Range    POC Glucose 132 (H) 65 - 105 mg/dL   POC Glucose Fingerstick   Result Value Ref Range    POC Glucose 102 65 - 105 mg/dL   POC Glucose Fingerstick   Result Value Ref Range    POC Glucose 166 (H) 65 - 105 mg/dL   POC Glucose Fingerstick   Result Value Ref Range    POC Glucose 136 (H) 65 - 105 mg/dL   POC Glucose Fingerstick   Result Value Ref Range    POC Glucose 122 (H) 65 - 105 mg/dL   POC Glucose Fingerstick   Result Value Ref Range    POC Glucose 128 (H) 65 - 105 mg/dL   POC Glucose Fingerstick   Result Value Ref Range    POC Glucose 220 (H) 65 - 105 mg/dL   POC Glucose Fingerstick   Result Value Ref Range    POC Glucose 118 (H) 65 - 105 mg/dL   POC Glucose Fingerstick   Result Value Ref Range    POC Glucose 107 (H) 65 - 105 mg/dL   POC Glucose Fingerstick   Result Value Ref Range    POC Glucose 135 (H) 65 - 105 mg/dL   POC Glucose Fingerstick   Result Value Ref Range    POC Glucose 141 (H) 65 - 105 mg/dL   POC Glucose Fingerstick   Result Value Ref Range    POC Glucose 123 (H) 65 - 105 mg/dL   POC Glucose Fingerstick   Result Value Ref Range    POC Glucose 111 (H) 65 - 105 mg/dL   POC Glucose Fingerstick   Result Value Ref Range    POC Glucose 130 (H) 65 - 105 mg/dL   POC Glucose Fingerstick   Result Value Ref Range    POC Glucose 127 (H) 65 - 105 mg/dL   EKG 12 Lead   Result Value Ref Range    Ventricular Rate 97 BPM    Atrial Rate 97 BPM    P-R Interval 144 ms    QRS Duration 68 ms    Q-T Interval 386 ms    QTc Calculation (Bazett) 490 ms    P Axis 99 degrees    R Axis 68 degrees    T Axis 72 degrees   EKG 12 Lead   Result Value Ref Range    Ventricular Rate 81 BPM    Atrial Rate 81 BPM    P-R Interval 176 ms    QRS Duration 82 ms    Q-T Interval 344 ms    QTc Calculation (Bazett) 399 ms    P Axis 75 degrees    R Axis 56 degrees    T Axis -32 degrees         IMAGING DATA:    Us Gi Endoscopic S&i    Result Date: 4/5/2021  Radiology exam is complete. No Radiologist dictation. Please follow up with ordering provider. Ct Abdomen Pelvis W Iv Contrast Additional Contrast? None    Result Date: 4/21/2021  EXAMINATION: CT OF THE ABDOMEN AND PELVIS WITH CONTRAST 4/21/2021 12:58 pm TECHNIQUE: CT of the abdomen and pelvis was performed with the administration of intravenous contrast. Multiplanar reformatted images are provided for review. Dose modulation, iterative reconstruction, and/or weight based adjustment of the mA/kV was utilized to reduce the radiation dose to as low as reasonably achievable. COMPARISON: CT abdomen and pelvis performed 04/03/2021.  HISTORY: ORDERING SYSTEM PROVIDED HISTORY: abdominal pain, recent FNA of pancreatic mass, on Anticoagulation TECHNOLOGIST PROVIDED HISTORY: abdominal pain, recent FNA of pancreatic mass, on Anticoagulation Decision Support Exception->Emergency Medical Condition ([place]) Reason for Exam: abdominal pain, recent FNA of pancreatic mass, on Anticoagulation Acuity: Unknown Type of Exam: Unknown FINDINGS: Lower Chest: The lung bases are without consolidation or effusion. Visualized abdominal structures are unremarkable. Organs: The liver and spleen are normal size and overall attenuation. The gallbladder and adrenal glands are unremarkable. There is redemonstration of a peripherally enhancing mass in the pancreatic body that is not significantly changed compared to prior exam.  The kidneys are without obstructive uropathy. The ureters are not dilated. The urinary bladder is unremarkable. GI/Bowel: The stomach is fluid-filled and otherwise unremarkable loops of small bowel are normal in caliber without evidence for obstruction. The colon contains air and fecal residue and is otherwise unremarkable. There is no intraperitoneal free air or free fluid. Lexie Crofts Pelvis: Uterus appears age-appropriate. Peritoneum/Retroperitoneum: The psoas muscles are symmetric. The abdominal aorta is normal in caliber. The inferior vena cava is unremarkable. There is no retroperitoneal or mesenteric adenopathy. Bones/Soft Tissues: The extra-abdominal soft tissues are unremarkable. There is no acute osseous abnormality. No acute abdominal or pelvic abnormality. Redemonstration of a peripherally enhancing mass in the pancreatic body that is not significantly changed compared to prior examination. Ct Abdomen Pelvis W Iv Contrast Additional Contrast? None    Result Date: 4/3/2021  EXAMINATION: CT OF THE ABDOMEN AND PELVIS WITH CONTRAST 4/3/2021 10:29 pm TECHNIQUE: CT of the abdomen and pelvis was performed with the administration of intravenous contrast. Multiplanar reformatted images are provided for review. Dose modulation, iterative reconstruction, and/or weight based adjustment of the mA/kV was utilized to reduce the radiation dose to as low as reasonably achievable.  COMPARISON: 10/20/2020 HISTORY: ORDERING SYSTEM PROVIDED HISTORY: right upper quadrant pain TECHNOLOGIST PROVIDED HISTORY: right upper quadrant pain Decision Support Exception->Emergency Medical Condition (MA) Reason for Exam: right upper quadrant pain Acuity: Acute Type of Exam: Initial FINDINGS: Lower Chest: The visualized part of lung bases are clear. Organs: 2.3 x 1.9 cm peripherally enhancing lesion appears to arise from the mid body of the pancreas. The peripheral aspect of this lesion has a more solid appearance in comparison to prior exam.  There is also associated mild dilatation of pancreatic duct. 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 There is unchanged focal area of fatty infiltration involving the anterior aspect of the liver near ligamentum teres. The liver, spleen, adrenal glands, gallbladder,  kidneys and ureters and pelvic organs including the urinary bladder appear unremarkable. GI/Bowel:Apparent hyperenhancement of the mucosa of the stomach. There has been interval mild thickening of the wall of the ascending colon which was not present on prior examination. The finding may be suggestive of infectious/inflammatory colitis. Peritoneum/Retroperitoneum: No free air or free fluid is noted. No pathologically enlarged lymphadenopathy. The vasculature do not demonstrate acute abnormality. Bones/Soft Tissues: Mild levoscoliosis with tip at L3-L4. Severe degenerative changes of left hip joint. status post right hip arthroplasty. Mild disc degenerative disease at L4-L5. Thickening of the wall of the ascending colon with mild surrounding fatty stranding is likely suggestive of infectious/inflammatory colitis. Apparent hyperenhancement of the mucosa of the stomach. Finding is nonspecific but may be suggestive of gastritis. 2.3 x 1.9 cm peripherally enhancing lesion appears to arise from the mid body of the pancreas.  The peripheral aspect of this lesion has a more solid appearance in comparison to prior exam. There is also associated mild dilatation of pancreatic duct. 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 For further evaluation of this finding contrast enhance MRCP examination is recommended. Severe degenerative changes of the left hip joint with bone-on-bone appearance. Xr Chest Portable    Result Date: 3/29/2021  EXAMINATION: ONE XRAY VIEW OF THE CHEST 3/29/2021 9:30 am COMPARISON: Chest radiograph performed 10/20/2020. HISTORY: ORDERING SYSTEM PROVIDED HISTORY: chest pain sob TECHNOLOGIST PROVIDED HISTORY: chest pain sob Reason for Exam: short of breath FINDINGS: There is no acute consolidation or effusion. There is no pneumothorax. The mediastinal structures are unremarkable. The upper abdomen is unremarkable. The extrathoracic soft tissues are unremarkable. There is no acute osseous abnormality. No acute cardiopulmonary process. Fluoro For Surgical Procedures    Result Date: 3/25/2021  Radiology exam is complete. No Radiologist dictation. Please follow up with ordering provider. Xr Abdomen (2 Views)    Result Date: 3/29/2021  EXAMINATION: TWO XRAY VIEWS OF THE ABDOMEN 3/29/2021 10:12 am COMPARISON: July 19, 2020; same date chest radiograph HISTORY: ORDERING SYSTEM PROVIDED HISTORY: upper abdominal pain TECHNOLOGIST PROVIDED HISTORY: upper abdominal pain Reason for Exam: upper abdominal pain FINDINGS: No intraperitoneal free air. Nonspecific but nonobstructive bowel gas pattern. No abnormal calcifications overlying the gallbladder urinary tract. No mass effect. Partially visualized right total hip arthroplasty appears intact. There is severe/advanced left hip osteoarthrosis with evidence for AVN to include flattening/deformity of the left femoral head. Nonspecific but nonobstructive bowel gas pattern.      Indication:   Pancreatic body lesion     Postprocedure diagnosis: Pancreatic body lesion-fine-needle biopsy performed     Anesthesia:  MAC     Complications: None Specimen: Sent to the lab     Description of Procedure:  Informed consent was obtained from the patient after explanation of the procedure including indications, description of the procedure,  benefits and possible risks and complications of the procedure, and alternatives. Questions were answered. The patient's history was reviewed and a directed physical examination was performed prior to the procedure. Patient was monitored throughout the procedure with pulse oximetry and periodic assessment of vital signs. Patient was sedated as noted above. With the patient in the left lateral decubitus position, the Olympus videoendoscope followed by endoechoendoscope was placed in the patient's mouth and under direct visualization passed into the esophagus. The scope was passed to the 2nd portion of the duodenum. The patient tolerated the procedure well and was taken to the recovery area in good condition. Estimated blood loss was none. EGD Findings[de-identified]   Esophagus: normal.   Stomach: Stomach had small sliding hiatal hernia. Stomach was otherwise normal.  Duodenum: normal     EUS findings:  Pancreas: There was a lesion near the pancreatic body measuring 23 mm x 18 mm. This had well-defined borders. There was a small calcifications within the lesion. This was solid-appearing. This was hypoechoic. Fine-needle biopsy of this lesion was performed via transgastric route with 22-gauge fine-needle biopsy needle. 4 passes were made. Preliminary cytology showed adequate cellularity. The rest of the pancreas otherwise appeared normal. Normal PD. No signs of chronic pancreatitis. CBD: Normal, no stones, sludge. CBD diameter: 5 mm. Left adrenal: Normal.   Ampulla: Normal.  Left lobe of liver: normal.   Lymphadenopathy: No pathologic lymphadenopathy seen in upper abdomen.        IMPRESSION:   Primary Problem  CRYSTAL (acute kidney injury) [facility])    Active Hospital Problems    Diagnosis Date Noted    Atelectasis [J98.11] 04/25/2021    Moderate episode of recurrent major depressive disorder ([facility] [F33.1] 04/24/2021    Noncompliance [Z91.19] 04/23/2021    Crack cocaine use [F14.90] 04/23/2021    Gastrointestinal stromal tumor (GIST) ([facility] [C49. A0]     Hypoglycemia [E16.2]     History of pulmonary embolism [Z86.711]     Right-sided chest wall pain [R07.89] 04/21/2021    CRYSTAL (acute kidney injury) ([facility] [N17.9] 04/21/2021    H/O malignant gastrointestinal stromal tumor (GIST) [Z85.09] 04/21/2021    Homeless [Z59.0] 04/04/2021    Pancreatic mass [K86.89] 04/04/2021    Multiple subsegmental pulmonary emboli without acute cor pulmonale (HCC) [I26.94]     Avascular necrosis of left femur ([facility] [M87.052] 06/15/2020    Protein calorie malnutrition ([facility] [facility] [E46] 09/22/2019    History of hepatitis C [Z86.19] 08/11/2019    COPD without exacerbation ([facility] [facility] [J44.9] 08/11/2019    Hypokalemia [E87.6] 08/11/2019    Avascular necrosis of bone of right hip ([facility] [M87.051] 08/10/2019    Polysubstance abuse ([facility] [facility] [F19.10]     Pancreas head lesion [S36.200A] 35/78/4384    Alcoholic cirrhosis of liver without ascites ([facility] [facility] [K70.30]     Chronic heart failure with reduced ejection fraction and diastolic dysfunction ([facility] [facility] [I50.42] 10/23/2018    Essential hypertension [I10] 10/11/2018    History of substance abuse ([facility] [facility] [F19.11] 10/11/2018         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 I personally reviewed results of lab work-up imaging studies and other relevant clinical data.   2. She has GIST , likely gastric, pt is interested in surgery   If discharged, plan to follow as an outpatient and coordinate follow-up with hepatobiliary as an outpatient          PAWAN ESTRADA [facility] MD [place] Hematologist/Medical Oncologist  [facility] hematology oncology physicians            This note is created with the assistance of a speech recognition program.  While intending to generate a document that actually reflects the content of the visit, the document can still have some errors including those of syntax and sound a like substitutions which may escape proof reading. It such instances, actual meaning can be extrapolated by contextual diversion.

## 2021-04-26 NOTE — PROGRESS NOTES
years, Hyperlipidemia, Hypertension, Migraine, Moderate COPD (chronic obstructive pulmonary disease) (HCC), Multiple transfusions, Neurogenic dysphagia, Osteoarthritis, Pneumonia, Scoliosis, and Substance abuse ([facility] [facility] has a past surgical history that includes  section; LEEP (); Upper gastrointestinal endoscopy (); Gastric bypass surgery; Appendectomy (2017); laparoscopic appendectomy (N/A, 3/27/2017); Upper gastrointestinal endoscopy (2018); Upper gastrointestinal endoscopy (N/A, 2018); pr colonoscopy w/biopsy single/multiple (N/A, 11/10/2018); pr egd transoral biopsy single/multiple (N/A, 2018); hip surgery (Right, 2019); Total hip arthroplasty (Right, 2019); Medication Injection (Left, 3/25/2021); and Upper gastrointestinal endoscopy (N/A, 2021). Restrictions  Restrictions/Precautions  Restrictions/Precautions: General Precautions, Fall Risk, Up as Tolerated  Required Braces or Orthoses?: No  Position Activity Restriction  Other position/activity restrictions: Up with assist     Subjective   General  Chart Reviewed: Yes  Response To Previous Treatment: Patient with no complaints from previous session. Family / Caregiver Present: No  Subjective  Subjective: RN and pt agreeable to PT. Pt reported pain near her pancreas and migraine pain. Pain Screening  Patient Currently in Pain: Yes(near \"pancreas\" ; also migraine pain)  Pain Assessment  Pain Assessment: 0-10  Pain Level: 7  Pain Type: Chronic pain  Pain Location: Head;Eye;Flank  Non-Pharmaceutical Pain Intervention(s): Repositioned;Rest;Ambulation/Increased Activity; Distraction  Response to Pain Intervention: Patient Satisfied  Vital Signs  Patient Currently in Pain: Yes(near \"pancreas\" ; also migraine pain)       Orientation  Orientation  Overall Orientation Status: Within Normal Limits    Objective   Bed mobility  Supine to Sit: Stand by assistance  Scooting: Stand by assistance  Transfers  Sit to Stand: Stand by assistance(from EOB, RW)  Stand to sit: Stand by assistance(from EOB, RW)  Bed to Chair: (pt defer d/t c/o having a headache)  Ambulation  Ambulation?: Yes  Ambulation 1  Surface: level tile  Device: Rolling Walker  Assistance: Contact guard assistance  Gait Deviations: Slow Geovanna;Decreased step length;Decreased step height  Distance: ~80ft     Balance  Posture: Fair  Sitting - Static: Good  Sitting - Dynamic: Good  Standing - Static: Fair;+  Standing - Dynamic: Fair  Exercises  Heelslides: BLE x10  Hip Flexion: BLE x10  Hip Abduction: BLE x10  Knee Long Arc Quad: BLE x10  Ankle Pumps: BLE x10       Goals  Short term goals  Time Frame for Short term goals: 10 visits  Short term goal 1: Independent bed mobility  Short term goal 2: Independent transfers  Short term goal 3: Independent ambulation with cane. Short term goal 4: Pt to tolerate 30 minutes of activity to improve strength and endurance. Patient Goals   Patient goals : Move without pain.     Plan    Plan  Times per week: 5x/week  Current Treatment Recommendations: Strengthening, Transfer Training, Endurance Training, Functional Mobility Training, Gait Training, Balance Training, Safety Education & Training  Safety Devices  Type of devices: Call light within reach, Nurse notified, Gait belt, Left in chair     Therapy Time   Individual Concurrent Group Co-treatment   Time In 1131         Time Out 1155         Minutes 24         Timed Code Treatment Minutes: [address], PTA

## 2021-04-26 NOTE — PLAN OF CARE
BRONCHOSPASM/BRONCHOCONSTRICTION   [x]  IMPROVE AERATION/BREATH SOUNDS  [x]   ADMINISTER BRONCHODILATOR THERAPY AS APPROPRIATE  [x]   ASSESS BREATH SOUNDS  [x]   PATIENT EDUCATION AS NEEDED

## 2021-04-26 NOTE — PLAN OF CARE
Problem: Respiratory  Intervention: Respiratory assessment  Note: BRONCHOSPASM/BRONCHOCONSTRICTION     [x]         IMPROVE AERATION/BREATH SOUNDS  [x]   ADMINISTER BRONCHODILATOR THERAPY AS APPROPRIATE  [x]   ASSESS BREATH SOUNDS  [x]   IMPLEMENT AEROSOL/MDI PROTOCOL  [x]   PATIENT EDUCATION AS NEEDED

## 2021-04-26 NOTE — FLOWSHEET NOTE
Assessment: The patient appeared sad and shared her anger with the . She shared about her living situation and how she is frustrated with her siblings and her son. Intervention:  engaged in active listening.  asked if they would like prayer and informed them chaplains are available 24/7. Outcome:  Patient was comforted by prayer. Chaplains will remain available to offer spiritual and emotional support as needed. 04/26/21 1140   Encounter Summary   Services provided to: Patient   Referral/Consult From: [facility] Family members   Continue Visiting   (04/26/21)   Complexity of Encounter High   Length of Encounter 30 minutes   Spiritual Assessment Completed Yes   Routine   Type Initial   Assessment Angry; Sad   Intervention Active listening   Outcome Expressed feelings/needs/concerns

## 2021-04-26 NOTE — PLAN OF CARE
Problem: Falls - Risk of:  Goal: Will remain free from falls  Outcome: Ongoing  Goal: Absence of physical injury  Outcome: Ongoing     Problem: Pain:  Goal: Pain level will decrease  Outcome: Ongoing  Goal: Control of acute pain  Outcome: Ongoing  Goal: Control of chronic pain  Outcome: Ongoing     Problem: Nutrition  Goal: Optimal nutrition therapy  Outcome: Ongoing     Problem: Health Behavior:  Goal: Ability to identify and utilize available support systems will improve  Outcome: Ongoing  Goal: Compliance with therapeutic regimen will improve  Outcome: Ongoing  Goal: Ability to keep healthcare appointments will improve  Outcome: Ongoing     Problem: Skin Integrity:  Goal: Will show no infection signs and symptoms  Outcome: Ongoing  Goal: Absence of new skin breakdown  Outcome: Ongoing     Problem: Respiratory  Intervention: Respiratory assessment  4/26/2021 0850 by Apolonia Peter RCP  Note: BRONCHOSPASM/BRONCHOCONSTRICTION     [x]         IMPROVE AERATION/BREATH SOUNDS  [x]   ADMINISTER BRONCHODILATOR THERAPY AS APPROPRIATE  [x]   ASSESS BREATH SOUNDS  [x]   IMPLEMENT AEROSOL/MDI PROTOCOL  [x]   PATIENT EDUCATION AS NEEDED

## 2021-04-26 NOTE — PROGRESS NOTES
Palliative Care Progress Note    NAME:  Rafael Daniels  MEDICAL RECORD NUMBER:  [id number]  AGE: 74 y.o. GENDER: female  : 1955  TODAY'S DATE:  2021    Reason for Consult:  goals of care and CODE STATUS discussion and emotional support  History of Present Illness     The patient is a 74 y.o. Non-/non  female who presents with Abdominal Pain, Chest Pain, and Headache      Referred to Palliative Care by  [x] Physician   [] Nursing  [] Family Request   [] Other:       She was admitted to the internal medicine service for Right-sided chest wall pain [R07.89]  Acute kidney injury ([address] [N17.9]. Her hospital course has been associated with right chest pain.  The patient has a complicated medical history and has been hospitalized since 2021 11:00 AM.    OVERNIGHT EVENTS:  No acute events overnight  Patient hemodynamically stable  Afebrile     BP (!) 148/72   Pulse 88   Temp 98.5 °F (36.9 °C) (Oral)   Resp 18   Ht 4' 11\" (1.499 m)   Wt 92 lb 3.2 oz (41.8 kg)   LMP 2015   SpO2 99%   BMI 18.62 kg/m²     Past Medical History:   Diagnosis Date    Appendicitis 3/27/2017    Cerebral artery occlusion with cerebral infarction ([address]     CHF (congestive heart failure) (HCC)     Chronic respiratory failure with hypoxia and hypercapnia (HCC)     Chronic rhinitis     Cigarette smoker     COPD (chronic obstructive pulmonary disease) ([address] 2018    Depression     Dysphagia     Essential hypertension 2018    GERD (gastroesophageal reflux disease)     Heart failure, diastolic, with acute decompensation ([address] 2018    Hepatitis C, chronic (HCC)     History of smoking at least 1 pack per day for at least 30 years     Hyperlipidemia     Hypertension     Migraine     Moderate COPD (chronic obstructive pulmonary disease) (HCC)     Multiple transfusions     Neurogenic dysphagia     Osteoarthritis     hands    Pneumonia     Scoliosis     Substance abuse ([address] ivda and cocaine abuse in past       Family History   Problem Relation Age of Onset    Breast Cancer Mother     Heart Disease Father        Social History     Tobacco Use    Smoking status: Former Smoker     Packs/day: 1.00     Years: 40.00     Pack years: 40.00     Types: Cigarettes     Start date: 5     Quit date: 2018     Years since quittin.4    Smokeless tobacco: Never Used   Substance Use Topics    Alcohol use: No     Alcohol/week: 0.0 standard drinks    Drug use: No     Types: IV, Cocaine     Comment: past history         Assessment        REVIEW OF SYSTEMS    []   UNABLE TO OBTAIN:     Constitutional:  []   Chills   [x]  Fatigue   []  Fevers   []  Malaise   []  Weight loss   [x] Other: Generalized tiredness    Respiratory:   []  Cough    []  Shortness of breath    []  Chest pain    [] Other:     Cardiovascular:   []  Chest pain  []  Dyspnea    []  Exertional chest pressure/discomfort     [] Fatigue      []  Palpitations    []  Syncope   [] Other:     Gastrointestinal:   []  Abdominal pain   []  Constipation    []  Diarrhea    []   Dysphagia   []  Reflux             []  Vomiting   [] Other:     Genitourinary:  []  Dysuria     []  Frequency   []  Hematuria   [] Nocturia   []  Urinary incontinence   [] Other:     Musculoskeletal:   [] Back pain    []  Muscle weakness   []  Myalgias    []  Neck pain   []  Stiff joints   [x]  Other: Headache    Behavioral/Psych:   [] Anxiety    []  Depression     []  Mood swings   [] Other:     PHYSICAL ASSESSMENT:     General: [x]  Oriented x3      [] well appearing      [] Intubated      [] ill appearing      [x] Other: Frail    Mental Status: [x] normal mental status exam      [] drowsy      [] Confused      [] Other:     Cardiovascular: [x]  Regular rate/rhythm      [] Arrhythmia      [] Other:     Chest: [x] Effort normal      [] lungs clear      [] respiratory distress      [] Tachypnea      []  Other:    Abdomen: [x] Soft/+ tenderness      []  Normal appearance      [] Distended      [] Ascites      [] Other:    Neurological: [x] Normal Speech      [] Normal Sensation      []  Deficits present:      Extremity:  [x] normal skin color/temp      [] clubbing/cyanosis      []  No edema      [] Other:     Palliative Performance Scale:  __x_60%  Ambulation reduced; Significant disease; Can't do hobbies/housework; intake normal or reduced; occasional assist; LOC full/confusion  ___50%  Mainly sit/lie; Extensive disease; Can't do any work; Considerable assist; intake normal or reduced; LOC full/confusion  ___40%  Mainly in bed; Extensive disease; Mainly assist; intake normal or reduced; LOC full/confusion   ___30%  Bed Bound; Extensive disease; Total care; intake reduced; LOCfull/confusion  ___20%  Bed Bound; Extensive disease; Total care; intake minimal; Drowsy/coma  ___10%  Bed Bound; Extensive disease;  Total care; Mouth care only; Drowsy/coma  ___0       Death      Plan      Palliative Interaction:  The patient was seen today for continuation of care  No family member was present in patient's room  Patient was alert, awake, oriented and following commands and in no acute distress  Patient did complain of having headache and stated that she has just got up from nap and may be coffee will help her    I discussed patient's current medical conditions with her  Patient had psychiatry consult and patient denied any voluntary admission to psychiatry unit and wants to continue her psych medications    Patient told that she is looking forward to go to nursing home facility  Patient again stated that she wants to have surgery   I encouraged her to remain strong and positive in her malcolm  I explained to the patient that if anytime she is not able to make her decisions then her only son Pete Kennedy will be her decision maker and she stated that she understands and is fine with that  I again explained the different types of codes to the patient and patient wanted to remain as full code Episode of recurrent major depressive disorder (HCC)    Atelectasis    1- Symptom management/ pain control     Pain Assessment:  Pain is controlled with current analgesics. Medication(s) being used: acetaminophen, narcotic analgesics including narco.               Anxiety:  fatigue                          Dyspnea:  none                          Fatigue:  Generalized weakness and tiredness    We feel the patient symptoms are being controlled. her current regimen is reviewed by myself and discussed with the staff. We will continue to provide comfort and support to the patient and family    2- Goals of care evaluation   The patient goals of care are improve or maintain function/quality of life, accomplish a particular personal goal, spiritual needs, strengthening relationships, preserve independence/autonomy/control and support for family/caregiver   Goals of care discussed with:    [x] Patient independently    [] Patient and Family    [] Family or Healthcare DPOA independently    [] Unable to discuss with patient, family/DPOA not present    3- Code Status  Full Code    4- Other recommendations  - We will continue to provide comfort and support to the patient and the family    Please call with any palliative questions or concerns. Palliative Care Team is available via perfect serve or via phone. Palliative Care will continue to follow Ms. Daniels's care as needed. The note has been dictated by dragon, typing errors may be a possibility     Thank you for allowing Palliative Care to participate in the care of Ms. Daniels .        Electronically signed by   Gabriela Price MD  Palliative Care Team  on 4/26/2021 at 8:56 AM    Palliative care office: [phone]

## 2021-04-27 VITALS
RESPIRATION RATE: 16 BRPM | HEART RATE: 100 BPM | OXYGEN SATURATION: 96 % | BODY MASS INDEX: 19.27 KG/M2 | DIASTOLIC BLOOD PRESSURE: 73 MMHG | WEIGHT: 95.6 LBS | TEMPERATURE: 98.5 F | SYSTOLIC BLOOD PRESSURE: 148 MMHG | HEIGHT: 59 IN

## 2021-04-27 LAB — GLUCOSE BLD-MCNC: 100 MG/DL (ref 65–105)

## 2021-04-27 PROCEDURE — 6370000000 HC RX 637 (ALT 250 FOR IP): Performed by: NURSE PRACTITIONER

## 2021-04-27 PROCEDURE — 82947 ASSAY GLUCOSE BLOOD QUANT: CPT

## 2021-04-27 PROCEDURE — 97116 GAIT TRAINING THERAPY: CPT

## 2021-04-27 PROCEDURE — 6370000000 HC RX 637 (ALT 250 FOR IP): Performed by: INTERNAL MEDICINE

## 2021-04-27 PROCEDURE — 97110 THERAPEUTIC EXERCISES: CPT

## 2021-04-27 PROCEDURE — 99232 SBSQ HOSP IP/OBS MODERATE 35: CPT | Performed by: FAMILY MEDICINE

## 2021-04-27 PROCEDURE — 94640 AIRWAY INHALATION TREATMENT: CPT

## 2021-04-27 PROCEDURE — 99239 HOSP IP/OBS DSCHRG MGMT >30: CPT | Performed by: INTERNAL MEDICINE

## 2021-04-27 RX ADMIN — FOLIC ACID 1 MG: 1 TABLET ORAL at 09:24

## 2021-04-27 RX ADMIN — CARVEDILOL 6.25 MG: 12.5 TABLET, FILM COATED ORAL at 09:24

## 2021-04-27 RX ADMIN — Medication 1 TABLET: at 09:23

## 2021-04-27 RX ADMIN — FERROUS SULFATE TAB EC 325 MG (65 MG FE EQUIVALENT) 325 MG: 325 (65 FE) TABLET DELAYED RESPONSE at 09:24

## 2021-04-27 RX ADMIN — FAMOTIDINE 20 MG: 20 TABLET, FILM COATED ORAL at 09:28

## 2021-04-27 RX ADMIN — HYDROCODONE BITARTRATE AND ACETAMINOPHEN 1 TABLET: 5; 325 TABLET ORAL at 13:47

## 2021-04-27 RX ADMIN — HYDROCODONE BITARTRATE AND ACETAMINOPHEN 1 TABLET: 5; 325 TABLET ORAL at 09:28

## 2021-04-27 RX ADMIN — APIXABAN 5 MG: 5 TABLET, FILM COATED ORAL at 09:23

## 2021-04-27 RX ADMIN — GUAIFENESIN DEXTROMETHORPHAN HYDROBROMIDE ORAL SOLUTION 10 ML: 200; 20 SOLUTION ORAL at 03:18

## 2021-04-27 RX ADMIN — GABAPENTIN 300 MG: 300 CAPSULE ORAL at 13:49

## 2021-04-27 RX ADMIN — HYDROCODONE BITARTRATE AND ACETAMINOPHEN 1 TABLET: 5; 325 TABLET ORAL at 03:18

## 2021-04-27 RX ADMIN — GABAPENTIN 300 MG: 300 CAPSULE ORAL at 09:24

## 2021-04-27 RX ADMIN — BUDESONIDE AND FORMOTEROL FUMARATE DIHYDRATE 2 PUFF: 160; 4.5 AEROSOL RESPIRATORY (INHALATION) at 08:40

## 2021-04-27 RX ADMIN — CLOPIDOGREL 75 MG: 75 TABLET, FILM COATED ORAL at 09:24

## 2021-04-27 ASSESSMENT — PAIN SCALES - GENERAL
PAINLEVEL_OUTOF10: 8
PAINLEVEL_OUTOF10: 8
PAINLEVEL_OUTOF10: 4
PAINLEVEL_OUTOF10: 7
PAINLEVEL_OUTOF10: 4

## 2021-04-27 NOTE — CARE COORDINATION
Transition planning   Guy Salinas from [facility] calls and they have received authorization. [facility]   Number for nursing report [phone] option 1 then #2 (2nd floor nursing station  Transportation at [address] - Emergency contact Pts Son  Guy Mediate for [employer] of time of pts transportation.

## 2021-04-27 NOTE — PLAN OF CARE
Problem: Falls - Risk of:  Goal: Will remain free from falls  Description: Will remain free from falls  4/27/2021 1052 by Christine Montano RN  Outcome: Completed  4/27/2021 0510 by Ezekiel Yousif RN  Outcome: Ongoing  Goal: Absence of physical injury  Description: Absence of physical injury  4/27/2021 1052 by Christine Montano RN  Outcome: Completed  4/27/2021 0510 by Ezekiel Yousif RN  Outcome: Ongoing     Problem: Pain:  Goal: Pain level will decrease  Description: Pain level will decrease  4/27/2021 1052 by Christine Montano RN  Outcome: Completed  4/27/2021 0510 by Ezekiel Yousif RN  Outcome: Ongoing  Goal: Control of acute pain  Description: Control of acute pain  4/27/2021 1052 by Christine Montano RN  Outcome: Completed  4/27/2021 0510 by Ezekiel Yousif RN  Outcome: Ongoing  Goal: Control of chronic pain  Description: Control of chronic pain  4/27/2021 1052 by Christine Montano RN  Outcome: Completed  4/27/2021 0510 by Ezekiel Yousif RN  Outcome: Ongoing     Problem: Health Behavior:  Goal: Ability to identify and utilize available support systems will improve  Description: Ability to identify and utilize available support systems will improve  4/27/2021 1052 by Christine Montano RN  Outcome: Completed  4/27/2021 0510 by Ezekiel Yousif RN  Outcome: Ongoing  Goal: Compliance with therapeutic regimen will improve  Description: Compliance with therapeutic regimen will improve  4/27/2021 1052 by Christine Montano RN  Outcome: Completed  4/27/2021 0510 by Ezekiel Yousif RN  Outcome: Ongoing  Goal: Ability to keep healthcare appointments will improve  Description: Ability to keep healthcare appointments will improve  4/27/2021 1052 by Christine Montano RN  Outcome: Completed  4/27/2021 0510 by Ezekiel Yousif RN  Outcome: Ongoing     Problem: Nutrition  Goal: Optimal nutrition therapy  Description: Nutrition Problem #1: Inadequate oral intake  Intervention: Food and/or Nutrient Delivery: Continue Current Diet, Start Oral Nutrition Supplement Nutritional Goals: PO intake to meet >75% of estimated nutrition needs      4/27/2021 1052 by Paul Van RN  Outcome: Completed  4/27/2021 0510 by Dre Suarez RN  Outcome: Ongoing     Problem: Skin Integrity:  Goal: Will show no infection signs and symptoms  Description: Will show no infection signs and symptoms  4/27/2021 1052 by Paul Van RN  Outcome: Completed  4/27/2021 0510 by Dre Suarez RN  Outcome: Ongoing  Goal: Absence of new skin breakdown  Description: Absence of new skin breakdown  4/27/2021 1052 by Paul Van RN  Outcome: Completed  4/27/2021 0510 by Dre Suarez RN  Outcome: Ongoing

## 2021-04-27 NOTE — PROGRESS NOTES
Physical Therapy  Facility/Department: [facility]  Daily Treatment Note  NAME: Emi Daniels  : 1955  MRN: [id number]    Date of Service: 2021    Discharge Recommendations:  Patient would benefit from continued therapy after discharge   PT Equipment Recommendations  Equipment Needed: No    Assessment   Body structures, Functions, Activity limitations: Decreased functional mobility ; Decreased strength;Decreased endurance;Decreased balance  Assessment: Pt performs bed mobility and transfers with CGA/SBA. Pt ambulated ~60ft with RW and CGA. Required verbal cues throughout for safe RW management, samantha decreased safety awareness. She would benefit from continued PT to address deficits and improve mobility and safety. Prognosis: Good  PT Education: Plan of Care;General Safety;Goals;PT Role;Home Exercise Program;Adaptive Device Training;Precautions;Transfer Training  REQUIRES PT FOLLOW UP: Yes  Activity Tolerance  Activity Tolerance: Patient limited by endurance;Treatment limited secondary to agitation;Patient limited by fatigue;Patient limited by pain  Activity Tolerance: pt is very particular and demands things immediately     Patient Diagnosis(es): The primary encounter diagnosis was CRYSTAL (acute kidney injury) ([address] Diagnoses of Hypoglycemia, Gastrointestinal stromal tumor (GIST) ([address] and Right upper quadrant abdominal pain were also pertinent to this visit.      has a past medical history of Appendicitis, Cerebral artery occlusion with cerebral infarction ([address] CHF (congestive heart failure) ([address] Chronic respiratory failure with hypoxia and hypercapnia (HCC), Chronic rhinitis, Cigarette smoker, COPD (chronic obstructive pulmonary disease) ([address] Depression, Dysphagia, Essential hypertension, GERD (gastroesophageal reflux disease), Heart failure, diastolic, with acute decompensation ([address] Hepatitis C, chronic ([address] History of smoking at least 1 pack per day for at least 30 years, Hyperlipidemia, Hypertension, Migraine, Moderate COPD (chronic obstructive pulmonary disease) (HCC), Multiple transfusions, Neurogenic dysphagia, Osteoarthritis, Pneumonia, Scoliosis, and Substance abuse ([facility] has a past surgical history that includes  section; LEEP (); Upper gastrointestinal endoscopy (); Gastric bypass surgery; Appendectomy (2017); laparoscopic appendectomy (N/A, 3/27/2017); Upper gastrointestinal endoscopy (2018); Upper gastrointestinal endoscopy (N/A, 2018); pr colonoscopy w/biopsy single/multiple (N/A, 11/10/2018); pr egd transoral biopsy single/multiple (N/A, 2018); hip surgery (Right, 2019); Total hip arthroplasty (Right, 2019); Medication Injection (Left, 3/25/2021); and Upper gastrointestinal endoscopy (N/A, 2021). Restrictions  Restrictions/Precautions  Restrictions/Precautions: General Precautions, Fall Risk, Up as Tolerated  Required Braces or Orthoses?: No  Position Activity Restriction  Other position/activity restrictions: Up with assist  Subjective   General  Chart Reviewed: Yes  Response To Previous Treatment: Patient with no complaints from previous session. Family / Caregiver Present: No  Subjective  Subjective: RN and pt agreeable to PT this morning. Pt supine in bed upon arrival finishing breakfast. Pt has no c/o pain. Cooperative throughout. General Comment  Comments: Pt returned to supine in bed at end of session with call light in hand. Pain Screening  Patient Currently in Pain: Denies  Vital Signs  Patient Currently in Pain: Denies       Orientation  Orientation  Overall Orientation Status: Within Normal Limits  Cognition   Cognition  Overall Cognitive Status: WFL  Objective   Bed mobility  Supine to Sit: Stand by assistance  Sit to Supine: Stand by assistance  Scooting: Stand by assistance  Comment: HOB slightly elevated, utilized bed rails.   Transfers  Sit to Stand: Stand by assistance  Stand to sit: Stand by assistance  Comment: RW used  Ambulation  Ambulation?: Yes  Ambulation 1  Surface: level tile  Device: Rolling Walker  Assistance: Contact guard assistance  Gait Deviations: Slow Geovanna;Decreased step length;Decreased step height  Distance: ~60ft  Comments: slow, shaky, VC's given for safe RW management. Stairs/Curb  Stairs?: No     Balance  Posture: Fair  Sitting - Static: Good  Sitting - Dynamic: Good  Standing - Static: Fair;+  Standing - Dynamic: Fair  Comments: Standing balance with rolling walker. Exercises:  Seated LE exercise program: Long Arc Quads, hip abduction/adduction, heel/toe raises, and marches. Reps: 10x each  Comments: LE exercises performed while seated EOB. Goals  Short term goals  Time Frame for Short term goals: 10 visits  Short term goal 1: Independent bed mobility  Short term goal 2: Independent transfers  Short term goal 3: Independent ambulation with cane. Short term goal 4: Pt to tolerate 30 minutes of activity to improve strength and endurance. Patient Goals   Patient goals : Move without pain.     Plan    Plan  Times per week: 5x/week  Current Treatment Recommendations: Strengthening, Transfer Training, Endurance Training, Functional Mobility Training, Gait Training, Balance Training, Safety Education & Training  Safety Devices  Type of devices: Call light within reach, Nurse notified, Gait belt, Left in bed, Bed alarm in place  Restraints  Initially in place: No     Therapy Time   Individual Concurrent Group Co-treatment   Time In 1010         Time Out 1033         Minutes 23         Timed Code Treatment Minutes: [address] Lizbeth Albert PTA

## 2021-04-27 NOTE — PROGRESS NOTES
Allergen Reactions    Aspirin Itching    Keflet [Cephalexin]      Tolerated penicillin V 12/13, tolerates cephalexin 6/14       Current Meds:   Scheduled Meds:    apixaban  5 mg Oral BID    lidocaine 1 % injection  5 mL Intradermal Once    sodium chloride flush  5-40 mL Intravenous 2 times per day    amitriptyline  10 mg Oral Nightly    atorvastatin  40 mg Oral Nightly    carvedilol  6.25 mg Oral BID WC    clopidogrel  75 mg Oral Daily    famotidine  20 mg Oral BID    ferrous sulfate  325 mg Oral Daily    folic acid  1 mg Oral Daily    gabapentin  300 mg Oral TID    mirtazapine  30 mg Oral Nightly    budesonide-formoterol  2 puff Inhalation BID    therapeutic multivitamin-minerals  1 tablet Oral Daily    sodium chloride flush  5-40 mL Intravenous 2 times per day     Continuous Infusions:    sodium chloride      sodium chloride      dextrose      sodium chloride Stopped (04/27/21 4577)     PRN Meds: dextromethorphan-guaiFENesin, butalbital-acetaminophen-caffeine, sodium chloride flush, sodium chloride, HYDROcodone 5 mg - acetaminophen, ipratropium-albuterol, sodium chloride flush, sodium chloride, potassium chloride **OR** potassium alternative oral replacement **OR** potassium chloride, magnesium sulfate, promethazine **OR** ondansetron, polyethylene glycol, acetaminophen **OR** acetaminophen, hydrOXYzine, glucose, dextrose, glucagon (rDNA), dextrose, heparin (porcine), heparin (porcine)    Data:     Past Medical History:   has a past medical history of Appendicitis, Cerebral artery occlusion with cerebral infarction ([facility] CHF (congestive heart failure) ([facility] Chronic respiratory failure with hypoxia and hypercapnia ([facility] Chronic rhinitis, Cigarette smoker, COPD (chronic obstructive pulmonary disease) ([facility] Depression, Dysphagia, Essential hypertension, GERD (gastroesophageal reflux disease), Heart failure, diastolic, with acute decompensation ([facility] Hepatitis C, chronic ([facility] History of smoking at least 1 pack per day for at least 30 years, Hyperlipidemia, Hypertension, Migraine, Moderate COPD (chronic obstructive pulmonary disease) ([facility] Multiple transfusions, Neurogenic dysphagia, Osteoarthritis, Pneumonia, Scoliosis, and Substance abuse ([facility] Social History:   reports that she quit smoking about 2 years ago. Her smoking use included cigarettes. She started smoking about 51 years ago. She has a 40.00 pack-year smoking history. She has never used smokeless tobacco. She reports that she does not drink alcohol or use drugs. Family History:   Family History   Problem Relation Age of Onset    Breast Cancer Mother     Heart Disease Father        Vitals:  BP (!) 148/73   Pulse 100   Temp 98.5 °F (36.9 °C) (Oral)   Resp 16   Ht 4' 11\" (1.499 m)   Wt 95 lb 9.6 oz (43.4 kg)   LMP 2015   SpO2 96%   BMI 19.31 kg/m²   Temp (24hrs), Av.7 °F (37.1 °C), Min:98.5 °F (36.9 °C), Max:98.9 °F (37.2 °C)    Recent Labs     21  1150 21  1601 21  0805   POCGLU 130* 127* 144* 100       I/O (24Hr):     Intake/Output Summary (Last 24 hours) at 2021 1120  Last data filed at 2021 0520  Gross per 24 hour   Intake 1190 ml   Output    Net 1190 ml       Labs:  Hematology:  Recent Labs     21  0651 21  1220   WBC 5.0 6.0   RBC 2.97* 2.83*   HGB 9.6* 9.4*   HCT 31.7* 29.2*   .7* 103.2*   MCH 32.3 33.2   MCHC 30.3 32.2   RDW 13.8 14.2    417   MPV 9.1 9.6     Chemistry:  Recent Labs     21  0651 21  1220   NA  --  143   K  --  3.6*   CL  --  108*   CO2  --  22   GLUCOSE  --  165*   BUN  --  11   CREATININE  --  0.83   ANIONGAP  --  13   LABGLOM  --  >60   GFRAA  --  >60   CALCIUM  --  8.0*   PROBNP 672*  --      Recent Labs     21  0808 21  1150 21  1601 21  0805   POCGLU 123* 111* 130* 127* 144* 100     ABG:  Lab Results   Component Value Date    POCPH 7.366 2019 of liver without ascites ([address] 4/21/2021 Yes    Pancreas head lesion 4/22/2021 Yes    Polysubstance abuse ([address] 4/23/2021 Yes    Avascular necrosis of bone of right hip ([address] 4/22/2021 Yes    COPD without exacerbation ([address] 4/21/2021 Yes    Hypokalemia 4/22/2021 Yes    History of hepatitis C (Chronic) 4/21/2021 Yes    Overview Signed 8/11/2019  2:24 PM by Domitila Harper DO     Previously treated at [address] [place] [address] calorie malnutrition ([address] 4/22/2021 Yes    Avascular necrosis of left femur ([address] 4/22/2021 Yes    Multiple subsegmental pulmonary emboli without acute cor pulmonale ([address] 4/22/2021 Yes    Pancreatic mass 4/23/2021 Yes    Homeless 4/21/2021 Yes    Right-sided chest wall pain 4/21/2021 Yes    H/O malignant gastrointestinal stromal tumor (GIST) 4/21/2021 Yes    Gastrointestinal stromal tumor (GIST) ([address] 4/22/2021 Yes    Hypoglycemia 4/22/2021 Yes    History of pulmonary embolism 4/22/2021 Yes    Noncompliance 4/23/2021 Yes    Crack cocaine use 4/23/2021 Yes    Moderate episode of recurrent major depressive disorder ([address] 4/26/2021 Yes    Atelectasis 4/25/2021 Yes          Plan:        1. Patient medically stable for discharge today. 2. Will need continued outpatient follow-up with oncology and hepatobiliary surgery for continued treatment of GIST tumor. 3. Continue taking antihypertensive regimen on discharge. 4. Will need DEANNA for PT/OT  5. Continue Eliquis for treatment of PE in July 2020  6.  Needs repeat CBC/RFP in one weeks time    Christopher Shen DO  4/27/2021  11:20 AM

## 2021-04-27 NOTE — PROGRESS NOTES
admission, weight loss    Nutrition Interventions:   Food and/or Nutrient Delivery:  Continue Current Diet, Continue Oral Nutrition Supplement  Nutrition Education/Counseling:  No recommendation at this time   Coordination of Nutrition Care:  Continue to monitor while inpatient    Goals:  PO intake to meet >75% of estimated nutrition needs       Nutrition Monitoring and Evaluation:   Food/Nutrient Intake Outcomes:  Food and Nutrient Intake, Supplement Intake  Physical Signs/Symptoms Outcomes:  Biochemical Data, GI Status, Nausea or Vomiting, Hemodynamic Status, Nutrition Focused Physical Findings, Skin, Weight     Electronically signed by Dustin Mcdaniel RD, LD on 4/27/21 at 1:27 PM EDT    Contact: [phone]

## 2021-04-27 NOTE — DISCHARGE SUMMARY
[facility]  Office: [address], DO, Jim Perezritu, DO, Megan Smithman, DO, Sergio Bee, DO, Sidney Arcos MD, Mary Villarreal MD, Charlene Ponce MD, Manda Zaman MD, Naresh Kitchen MD, Nick Edouard MD, Shanti Dennis MD, Khalida Brito MD, Gopal Durbin, DO, Ollie Cordero MD, Juan A España, DO, Elyse Zheng MD,  Simon Orlando, DO, Romana Begin, MD, Xavi Rico MD, Gagan Cuellar MD, Rachel Urias MD, Elena Gallardo, [facility], CNP, [facility], CNP, Dandre Hutson, CNS, Eduard Vargas, CNP, Symone Welch, CNP, Sher Duong, CNP, Ramez Shelely, CNP, Osbaldo Rojas, CNP, Patricia Diaz PA-C, Chu Polo, [facility], Archie Hair, CNP, Hazel Milian, CNP, Nicci Mckee, CNP, Brandyn Perea, CNP, Elke [facility], CNP, Michel Evans, [facility]    Discharge Summary     Patient ID: Horace Lentz  :  1955   MRN: [id number]     ACCOUNT:  [de-identified]   Patient's PCP: No primary care provider on file. Admit Date: 2021   Discharge Date: 2021     Length of Stay: 6  Code Status:  Full Code  Admitting Physician: No admitting provider for patient encounter.   Discharge Physician: Diony Somers DO     Active Discharge Diagnoses:     Hospital Problem Lists:  Principal Problem:    CRYSTAL (acute kidney injury) [facility])  Active Problems:    Essential hypertension    History of substance abuse ([facility]    Chronic heart failure with reduced ejection fraction and diastolic dysfunction (HCC)    Alcoholic cirrhosis of liver without ascites (HCC)    Pancreas head lesion    Polysubstance abuse ([facility]    Avascular necrosis of bone of right hip (HCC)    COPD without exacerbation (HCC)    Hypokalemia    History of hepatitis C    Protein calorie malnutrition (HCC)    Avascular necrosis of left femur (HCC)    Multiple subsegmental pulmonary emboli without acute cor pulmonale (HCC)    Pancreatic mass 05/04/2012     04/26/2021    K 3.6 04/26/2021     04/26/2021    CO2 22 04/26/2021    ANIONGAP 13 04/26/2021    BUN 11 04/26/2021    CREATININE 0.83 04/26/2021    BUNCRER NOT REPORTED 04/26/2021    CALCIUM 8.0 04/26/2021    LABGLOM >60 04/26/2021    GFRAA >60 04/26/2021    GFR      04/26/2021    GFR NOT REPORTED 04/26/2021        Radiology:  Ct Abdomen W Contrast Additional Contrast? Radiologist Recommendation    Result Date: 4/23/2021  2.5 cm peripherally enhancing lesion either arising from or contacting the inferior aspect of the pancreatic body within the lesser sac unchanged dating back to 2007. This is nonspecific but given the long-term stability is likely benign. Ct Abdomen Pelvis W Iv Contrast Additional Contrast? None    Result Date: 4/21/2021  No acute abdominal or pelvic abnormality. Redemonstration of a peripherally enhancing mass in the pancreatic body that is not significantly changed compared to prior examination. Xr Chest Portable    Result Date: 4/25/2021  Left basal subsegmental atelectasis       Consultations:    Consults:     Final Specialist Recommendations/Findings:   IP CONSULT TO HOSPITALIST  IP CONSULT TO SOCIAL WORK  IP CONSULT TO GENERAL SURGERY  IP CONSULT TO IV TEAM  IP CONSULT TO HEM/ONC  IP CONSULT TO PSYCHIATRY  IP CONSULT TO PALLIATIVE CARE  CHEMICAL DEPENDENCY REFERRAL FOR SOCIAL SERVICE CONSULT  IP CONSULT TO SOCIAL WORK  IP CONSULT TO SPIRITUAL SERVICES      The patient was seen and examined on day of discharge and this discharge summary is in conjunction with any daily progress note from day of discharge. Discharge plan:     Disposition: To a non-[facility] facility    Physician Follow Up:     Alicia Saldivar MD  [address] [address]  [phone]    Schedule an appointment as soon as possible for a visit in 1 week  Hepatobiliary surgery eval due to GIST tumor with pancreatic body involvement.      Sanjuana Thompson MD  [address] [place] [address] [address]  [phone]    In 1 week  GIST tumor    Your PCP    Schedule an appointment as soon as possible for a visit         Requiring Further Evaluation/Follow Up POS-Patient is continued follow-up with her oncologist for continued treatment of her GIST tumor.  -Patient needs to follow-up with hepatobiliary surgery for treatment of GIST tumor  -Patient needs outpatient follow-up with her primary care physician for posthospitalization follow-up. -Return to the hospital he develop worsening chest pain, shortness of breath\" breathing, nausea, vomiting, diarrhea.  -Needs repeat RFP in one week.       HOSPITALIZATION/Incidental Findings:     Diet: regular diet    Activity: As tolerated    Instructions to Patient: see above    Discharge Medications:      Medication List      CONTINUE taking these medications    albuterol sulfate  (90 Base) MCG/ACT inhaler  Commonly known as: Proventil HFA  Inhale 1-2 puffs into the lungs once for 1 dose     amitriptyline 10 MG tablet  Commonly known as: ELAVIL  Take 1 tablet by mouth nightly     apixaban 5 MG Tabs tablet  Commonly known as: ELIQUIS  Take 1 tablet by mouth 2 times daily     atorvastatin 40 MG tablet  Commonly known as: LIPITOR  Take 1 tablet by mouth nightly     butalbital-acetaminophen-caffeine -40 MG per tablet  Commonly known as: FIORICET, ESGIC  Take 1 tablet by mouth every 12 hours for 5 doses     carvedilol 6.25 MG tablet  Commonly known as: COREG  Take 1 tablet by mouth 2 times daily (with meals)     clopidogrel 75 MG tablet  Commonly known as: PLAVIX  Take 1 tablet by mouth daily     cyanocobalamin 250 MCG tablet  Take 1 tablet by mouth daily     famotidine 20 MG tablet  Commonly known as: PEPCID  Take 1 tablet by mouth 2 times daily     ferrous sulfate 325 (65 Fe) MG tablet  Commonly known as: Willy-Dominik  Take 1 tablet by mouth daily     folic acid 1 MG tablet  Commonly known as: FOLVITE  Take 1 tablet by mouth daily     gabapentin 300 MG capsule  Commonly known as: NEURONTIN  Take 1 capsule by mouth 3 times daily for 30 days. ipratropium-albuterol 0.5-2.5 (3) MG/3ML Soln nebulizer solution  Commonly known as: DUONEB  Inhale 3 mLs into the lungs every 4 hours as needed for Shortness of Breath     lisinopril 10 MG tablet  Commonly known as: PRINIVIL;ZESTRIL  Take 1 tablet by mouth daily     Lumbar Back Brace/Support Pad Misc  1 Units by Does not apply route daily     mirtazapine 30 MG tablet  Commonly known as: REMERON  Take 1 tablet by mouth nightly     mometasone-formoterol 100-5 MCG/ACT inhaler  Commonly known as: DULERA  Inhale 2 puffs into the lungs 2 times daily     Senna 8.6 MG Caps     therapeutic multivitamin-minerals tablet  Take 1 tablet by mouth daily     thiamine 100 MG tablet  Take 1 tablet by mouth daily     Wheelchair Misc  1 Device by Does not apply route once for 1 dose        STOP taking these medications    melatonin 1 MG tablet            No discharge procedures on file. Time Spent on discharge is  38 mins in patient examination, evaluation, counseling as well as medication reconciliation, prescriptions for required medications, discharge plan and follow up. Electronically signed by   Percy Walsh DO  4/27/2021  11:35 AM      Thank you Dr. Raven Wilson primary care provider on file. for the opportunity to be involved in this patient's care.

## 2021-04-27 NOTE — CARE COORDINATION
Contacted Jane @ [facility]. Precert was started yesterday by third party- [employer]. Spoke with Santos Addison @ [employer]- request received. Hope to have decision later today.

## 2021-04-27 NOTE — PROGRESS NOTES
Palliative Care Progress Note    NAME:  Mariel Daniels  MEDICAL RECORD NUMBER:  [id number]  AGE: 74 y.o. GENDER: female  : 1955  TODAY'S DATE:  2021    Reason for Consult:  goals of care and family support  History of Present Illness     The patient is a 74 y.o. Non-/non  female who presents with Abdominal Pain, Chest Pain, and Headache      Referred to Palliative Care by  [x] Physician   [] Nursing  [] Family Request   [] Other:       She was admitted to the internal medicine service for Right-sided chest wall pain [R07.89]  Acute kidney injury ([address] [N17.9]. Her hospital course has been associated with chest pain.  The patient has a complicated medical history and has been hospitalized since 2021 11:00 AM.    OVERNIGHT EVENTS:  No acute events overnight  Patient hemodynamically stable  Afebrile     BP (!) 148/73   Pulse 100   Temp 98.5 °F (36.9 °C) (Oral)   Resp 16   Ht 4' 11\" (1.499 m)   Wt 95 lb 9.6 oz (43.4 kg)   LMP 2015   SpO2 96%   BMI 19.31 kg/m²     Past Medical History:   Diagnosis Date    Appendicitis 3/27/2017    Cerebral artery occlusion with cerebral infarction ([address]     CHF (congestive heart failure) (HCC)     Chronic respiratory failure with hypoxia and hypercapnia (HCC)     Chronic rhinitis     Cigarette smoker     COPD (chronic obstructive pulmonary disease) ([address] 2018    Depression     Dysphagia     Essential hypertension 2018    GERD (gastroesophageal reflux disease)     Heart failure, diastolic, with acute decompensation ([address] 2018    Hepatitis C, chronic (HCC)     History of smoking at least 1 pack per day for at least 30 years     Hyperlipidemia     Hypertension     Migraine     Moderate COPD (chronic obstructive pulmonary disease) (HCC)     Multiple transfusions     Neurogenic dysphagia     Osteoarthritis     hands    Pneumonia     Scoliosis     Substance abuse ([address]     ivda and cocaine abuse in past Family History   Problem Relation Age of Onset    Breast Cancer Mother     Heart Disease Father        Social History     Tobacco Use    Smoking status: Former Smoker     Packs/day: 1.00     Years: 40.00     Pack years: 40.00     Types: Cigarettes     Start date: 5     Quit date: 2018     Years since quittin.4    Smokeless tobacco: Never Used   Substance Use Topics    Alcohol use: No     Alcohol/week: 0.0 standard drinks    Drug use: No     Types: IV, Cocaine     Comment: past history         Assessment        REVIEW OF SYSTEMS    []   UNABLE TO OBTAIN:     Constitutional:  []   Chills   [x]  Fatigue   []  Fevers   []  Malaise   []  Weight loss   [x] Other: Tiredness and weakness    Respiratory:   []  Cough    []  Shortness of breath    []  Chest pain    [] Other:     Cardiovascular:   []  Chest pain  []  Dyspnea    []  Exertional chest pressure/discomfort     [x] Fatigue      []  Palpitations    []  Syncope   [] Other:     Gastrointestinal:   []  Abdominal pain   []  Constipation    []  Diarrhea    []   Dysphagia   []  Reflux             []  Vomiting   [] Other:     Genitourinary:  []  Dysuria     []  Frequency   []  Hematuria   [] Nocturia   []  Urinary incontinence   [] Other:     Musculoskeletal:   [] Back pain    []  Muscle weakness   []  Myalgias    []  Neck pain   []  Stiff joints   []  Other:     Behavioral/Psych:   [] Anxiety    []  Depression     []  Mood swings   [] Other:     PHYSICAL ASSESSMENT:     General: [x]  Oriented x3      [] well appearing      [] Intubated      [x] ill appearing      [x] Other: Frail    Mental Status: [x] normal mental status exam      [] drowsy      [] Confused      [] Other:     Cardiovascular: [x]  Regular rate/rhythm      [] Arrhythmia      [] Other:     Chest: [x] Effort normal      [] lungs clear      [] respiratory distress      [] Tachypnea      []  Other:    Abdomen: [x] Soft/+tender      []  Normal appearance      [] Distended      [] Ascites      [] Other:    Neurological: [x] Normal Speech      [] Normal Sensation      []  Deficits present:      Extremity:  [x] normal skin color/temp      [] clubbing/cyanosis      [x]  No edema      [] Other:     Palliative Performance Scale:  ___60%  Ambulation reduced; Significant disease; Can't do hobbies/housework; intake normal or reduced; occasional assist; LOC full/confusion  __x_50%  Mainly sit/lie; Extensive disease; Can't do any work; Considerable assist; intake normal or reduced; LOC full/confusion  ___40%  Mainly in bed; Extensive disease; Mainly assist; intake normal or reduced; LOC full/confusion   ___30%  Bed Bound; Extensive disease; Total care; intake reduced; LOCfull/confusion  ___20%  Bed Bound; Extensive disease; Total care; intake minimal; Drowsy/coma  ___10%  Bed Bound; Extensive disease;  Total care; Mouth care only; Drowsy/coma  ___0       Death      Plan      Palliative Interaction:  The patient was seen today for continuation of care and emotional support  No family member was present in patient's room  Patient was lying comfortably in the bed alert, awake, oriented and following commands  I discussed patient's current medical conditions with her  I told the patient that she may be going to the nursing home facility today  I encouraged the patient to take active part in PT/OT therapy scheduled for her  I encouraged the patient to remain positive and strong in her malcolm  I advised the patient to follow-up with her schedule doctor visits  I again told the patient that if anytime she cannot make her decisions then her only son Amanda Resendez will be her legal spokesperson and patient said that she understands and is okay with it  I offered comfort and emotional support to the patient    Education/support to staff  Education/support to family  Education/support to patient  Discharge planning/helping to coordinate care  Communications with primary service  Caregiver support/education     Principle Problem/Diagnosis: CRYSTAL (acute kidney injury) ([facility]    Additional Assessments:  Principal Problem:    CRYSTAL (acute kidney injury) ([facility]  Active Problems:    Essential hypertension    History of substance abuse ([facility]    Chronic heart failure with reduced ejection fraction and diastolic dysfunction (HCC)    Alcoholic cirrhosis of liver without ascites (HCC)    Pancreas head lesion    Polysubstance abuse ([facility]    Avascular necrosis of bone of right hip (HCC)    COPD without exacerbation (HCC)    Hypokalemia    History of hepatitis C    Protein calorie malnutrition (HCC)    Avascular necrosis of left femur (HCC)    Multiple subsegmental pulmonary emboli without acute cor pulmonale (HCC)    Pancreatic mass    Homeless    Right-sided chest wall pain    H/O malignant gastrointestinal stromal tumor (GIST)    Gastrointestinal stromal tumor (GIST) (HCC)    Hypoglycemia    History of pulmonary embolism    Noncompliance    Crack cocaine use    Moderate episode of recurrent major depressive disorder (HCC)    Atelectasis    1- Symptom management/ pain control     Pain Assessment:  Pain is controlled with current analgesics. Medication(s) being used: acetaminophen, narcotic analgesics including Norco.               Anxiety:  fatigue                          Dyspnea:  none                          Fatigue:  Weakness and tiredness     We feel the patient symptoms are being controlled. her current regimen is reviewed by myself and discussed with the staff.      We will continue to provide comfort and support to the patient and family    2- Goals of care evaluation   The patient goals of care are improve or maintain function/quality of life, accomplish a particular personal goal, spiritual needs, strengthening relationships, preserve independence/autonomy/control and support for family/caregiver   Goals of care discussed with:    [x] Patient independently    [] Patient and Family    [] Family or Healthcare DPOA independently    [] Unable to discuss with patient, family/DPOA not present    3- Code Status  Full Code    4- Other recommendations  - We will continue to provide comfort and support to the patient and the family    Please call with any palliative questions or concerns. Palliative Care Team is available via perfect serve or via phone. Palliative Care will continue to follow Ms. Daniels's care as needed. The note has been dictated by dragon, typing errors may be a possibility     Thank you for allowing Palliative Care to participate in the care of Ms. Daniels .        Electronically signed by   Michael Arredondo MD  Palliative Care Team  on 4/27/2021 at 9:57 AM    Palliative care office: [phone]

## 2021-04-27 NOTE — PLAN OF CARE
Problem: Falls - Risk of:  Goal: Will remain free from falls  Description: Will remain free from falls  4/27/2021 0510 by Alfred Lopez RN  Outcome: Ongoing  4/26/2021 1827 by Kristine Zayas RN  Outcome: Ongoing  Goal: Absence of physical injury  Description: Absence of physical injury  4/27/2021 0510 by Alfred Lopez RN  Outcome: Ongoing  4/26/2021 1827 by Kristine Zayas RN  Outcome: Ongoing     Problem: Pain:  Goal: Pain level will decrease  Description: Pain level will decrease  4/27/2021 0510 by Alfred Lopez RN  Outcome: Ongoing  4/26/2021 1827 by Kristine Zayas RN  Outcome: Ongoing  Goal: Control of acute pain  Description: Control of acute pain  4/27/2021 0510 by Alfred Lopez RN  Outcome: Ongoing  4/26/2021 1827 by Kristine Zayas RN  Outcome: Ongoing  Goal: Control of chronic pain  Description: Control of chronic pain  4/27/2021 0510 by Alfred Lopez RN  Outcome: Ongoing  4/26/2021 1827 by Kristine Zayas RN  Outcome: Ongoing

## 2021-04-28 LAB — GLUCOSE BLD-MCNC: 123 MG/DL (ref 65–105)

## 2021-04-28 NOTE — CARE COORDINATION
Transition planning   Completed and faxed [address] [phone] to Everton Gandara at [facility] at [phone]

## 2021-05-03 ENCOUNTER — HOSPITAL ENCOUNTER (OUTPATIENT)
Facility: MEDICAL CENTER | Age: 66
End: 2021-05-03
Payer: MEDICARE

## 2021-05-05 ENCOUNTER — APPOINTMENT (OUTPATIENT)
Dept: CT IMAGING | Age: 66
DRG: 282 | End: 2021-05-05
Payer: MEDICARE

## 2021-05-05 ENCOUNTER — APPOINTMENT (OUTPATIENT)
Dept: GENERAL RADIOLOGY | Age: 66
DRG: 282 | End: 2021-05-05
Payer: MEDICARE

## 2021-05-05 ENCOUNTER — HOSPITAL ENCOUNTER (INPATIENT)
Age: 66
LOS: 6 days | Discharge: HOME | DRG: 282 | End: 2021-05-13
Attending: EMERGENCY MEDICINE | Admitting: EMERGENCY MEDICINE
Payer: MEDICARE

## 2021-05-05 DIAGNOSIS — R10.11 RIGHT UPPER QUADRANT ABDOMINAL PAIN: ICD-10-CM

## 2021-05-05 DIAGNOSIS — R06.02 SHORTNESS OF BREATH: ICD-10-CM

## 2021-05-05 DIAGNOSIS — S36.200D: ICD-10-CM

## 2021-05-05 DIAGNOSIS — R10.13 EPIGASTRIC PAIN: Primary | ICD-10-CM

## 2021-05-05 PROBLEM — R10.9 ABDOMINAL PAIN: Status: ACTIVE | Noted: 2021-05-05

## 2021-05-05 LAB
ABSOLUTE EOS #: 0.05 K/UL (ref 0–0.44)
ABSOLUTE IMMATURE GRANULOCYTE: 0.07 K/UL (ref 0–0.3)
ABSOLUTE LYMPH #: 1.9 K/UL (ref 1.1–3.7)
ABSOLUTE MONO #: 0.63 K/UL (ref 0.1–1.2)
ALBUMIN SERPL-MCNC: 4.7 G/DL (ref 3.5–5.2)
ALBUMIN/GLOBULIN RATIO: 0.9 (ref 1–2.5)
ALP BLD-CCNC: 127 U/L (ref 35–104)
ALT SERPL-CCNC: 41 U/L (ref 5–33)
ANION GAP SERPL CALCULATED.3IONS-SCNC: 19 MMOL/L (ref 9–17)
AST SERPL-CCNC: 65 U/L
BASOPHILS # BLD: 0 % (ref 0–2)
BASOPHILS ABSOLUTE: <0.03 K/UL (ref 0–0.2)
BILIRUB SERPL-MCNC: 0.57 MG/DL (ref 0.3–1.2)
BNP INTERPRETATION: ABNORMAL
BUN BLDV-MCNC: 15 MG/DL (ref 8–23)
BUN/CREAT BLD: ABNORMAL (ref 9–20)
CALCIUM SERPL-MCNC: 9.8 MG/DL (ref 8.6–10.4)
CHLORIDE BLD-SCNC: 96 MMOL/L (ref 98–107)
CO2: 19 MMOL/L (ref 20–31)
CREAT SERPL-MCNC: 0.76 MG/DL (ref 0.5–0.9)
D-DIMER QUANTITATIVE: 3.01 MG/L FEU
DIFFERENTIAL TYPE: ABNORMAL
EOSINOPHILS RELATIVE PERCENT: 1 % (ref 1–4)
GFR AFRICAN AMERICAN: >60 ML/MIN
GFR NON-AFRICAN AMERICAN: >60 ML/MIN
GFR SERPL CREATININE-BSD FRML MDRD: ABNORMAL ML/MIN/{1.73_M2}
GFR SERPL CREATININE-BSD FRML MDRD: ABNORMAL ML/MIN/{1.73_M2}
GLUCOSE BLD-MCNC: 70 MG/DL (ref 70–99)
HCT VFR BLD CALC: 42.4 % (ref 36.3–47.1)
HEMOGLOBIN: 13.3 G/DL (ref 11.9–15.1)
IMMATURE GRANULOCYTES: 1 %
LIPASE: 56 U/L (ref 13–60)
LYMPHOCYTES # BLD: 18 % (ref 24–43)
MCH RBC QN AUTO: 33.2 PG (ref 25.2–33.5)
MCHC RBC AUTO-ENTMCNC: 31.4 G/DL (ref 28.4–34.8)
MCV RBC AUTO: 105.7 FL (ref 82.6–102.9)
MONOCYTES # BLD: 6 % (ref 3–12)
MYOGLOBIN: 81 NG/ML (ref 25–58)
NRBC AUTOMATED: 0 PER 100 WBC
PDW BLD-RTO: 15.1 % (ref 11.8–14.4)
PLATELET # BLD: 610 K/UL (ref 138–453)
PLATELET ESTIMATE: ABNORMAL
PMV BLD AUTO: 9.5 FL (ref 8.1–13.5)
POTASSIUM SERPL-SCNC: 3.7 MMOL/L (ref 3.7–5.3)
PRO-BNP: 478 PG/ML
RBC # BLD: 4.01 M/UL (ref 3.95–5.11)
RBC # BLD: ABNORMAL 10*6/UL
SARS-COV-2, RAPID: NOT DETECTED
SEG NEUTROPHILS: 74 % (ref 36–65)
SEGMENTED NEUTROPHILS ABSOLUTE COUNT: 7.65 K/UL (ref 1.5–8.1)
SODIUM BLD-SCNC: 134 MMOL/L (ref 135–144)
SPECIMEN DESCRIPTION: NORMAL
TOTAL CK: 262 U/L (ref 26–192)
TOTAL PROTEIN: 9.9 G/DL (ref 6.4–8.3)
TROPONIN INTERP: NORMAL
TROPONIN INTERP: NORMAL
TROPONIN T: NORMAL NG/ML
TROPONIN T: NORMAL NG/ML
TROPONIN, HIGH SENSITIVITY: 12 NG/L (ref 0–14)
TROPONIN, HIGH SENSITIVITY: 13 NG/L (ref 0–14)
WBC # BLD: 10.3 K/UL (ref 3.5–11.3)
WBC # BLD: ABNORMAL 10*3/UL

## 2021-05-05 PROCEDURE — 96375 TX/PRO/DX INJ NEW DRUG ADDON: CPT

## 2021-05-05 PROCEDURE — 2580000003 HC RX 258: Performed by: EMERGENCY MEDICINE

## 2021-05-05 PROCEDURE — 96372 THER/PROPH/DIAG INJ SC/IM: CPT

## 2021-05-05 PROCEDURE — 85025 COMPLETE CBC W/AUTO DIFF WBC: CPT

## 2021-05-05 PROCEDURE — 83880 ASSAY OF NATRIURETIC PEPTIDE: CPT

## 2021-05-05 PROCEDURE — 93005 ELECTROCARDIOGRAM TRACING: CPT | Performed by: EMERGENCY MEDICINE

## 2021-05-05 PROCEDURE — 71045 X-RAY EXAM CHEST 1 VIEW: CPT

## 2021-05-05 PROCEDURE — 87635 SARS-COV-2 COVID-19 AMP PRB: CPT

## 2021-05-05 PROCEDURE — 85379 FIBRIN DEGRADATION QUANT: CPT

## 2021-05-05 PROCEDURE — 83690 ASSAY OF LIPASE: CPT

## 2021-05-05 PROCEDURE — 6370000000 HC RX 637 (ALT 250 FOR IP): Performed by: EMERGENCY MEDICINE

## 2021-05-05 PROCEDURE — 84484 ASSAY OF TROPONIN QUANT: CPT

## 2021-05-05 PROCEDURE — G0378 HOSPITAL OBSERVATION PER HR: HCPCS

## 2021-05-05 PROCEDURE — 80053 COMPREHEN METABOLIC PANEL: CPT

## 2021-05-05 PROCEDURE — 96374 THER/PROPH/DIAG INJ IV PUSH: CPT

## 2021-05-05 PROCEDURE — 82550 ASSAY OF CK (CPK): CPT

## 2021-05-05 PROCEDURE — 99284 EMERGENCY DEPT VISIT MOD MDM: CPT

## 2021-05-05 PROCEDURE — 96376 TX/PRO/DX INJ SAME DRUG ADON: CPT

## 2021-05-05 PROCEDURE — 6360000002 HC RX W HCPCS: Performed by: EMERGENCY MEDICINE

## 2021-05-05 PROCEDURE — 83874 ASSAY OF MYOGLOBIN: CPT

## 2021-05-05 RX ORDER — MORPHINE SULFATE 4 MG/ML
4 INJECTION, SOLUTION INTRAMUSCULAR; INTRAVENOUS ONCE
Status: COMPLETED | OUTPATIENT
Start: 2021-05-05 | End: 2021-05-05

## 2021-05-05 RX ORDER — BUTALBITAL, ACETAMINOPHEN AND CAFFEINE 50; 325; 40 MG/1; MG/1; MG/1
1 TABLET ORAL ONCE
Status: COMPLETED | OUTPATIENT
Start: 2021-05-05 | End: 2021-05-05

## 2021-05-05 RX ORDER — SODIUM CHLORIDE, SODIUM LACTATE, POTASSIUM CHLORIDE, CALCIUM CHLORIDE 600; 310; 30; 20 MG/100ML; MG/100ML; MG/100ML; MG/100ML
1000 INJECTION, SOLUTION INTRAVENOUS ONCE
Status: COMPLETED | OUTPATIENT
Start: 2021-05-05 | End: 2021-05-05

## 2021-05-05 RX ORDER — ONDANSETRON 2 MG/ML
4 INJECTION INTRAMUSCULAR; INTRAVENOUS ONCE
Status: COMPLETED | OUTPATIENT
Start: 2021-05-05 | End: 2021-05-05

## 2021-05-05 RX ADMIN — ONDANSETRON 4 MG: 2 INJECTION INTRAMUSCULAR; INTRAVENOUS at 19:05

## 2021-05-05 RX ADMIN — ENOXAPARIN SODIUM 40 MG: 40 INJECTION, SOLUTION INTRAVENOUS; SUBCUTANEOUS at 22:43

## 2021-05-05 RX ADMIN — SODIUM CHLORIDE, POTASSIUM CHLORIDE, SODIUM LACTATE AND CALCIUM CHLORIDE 1000 ML: 600; 310; 30; 20 INJECTION, SOLUTION INTRAVENOUS at 19:08

## 2021-05-05 RX ADMIN — MORPHINE SULFATE 4 MG: 4 INJECTION INTRAVENOUS at 19:05

## 2021-05-05 RX ADMIN — MORPHINE SULFATE 4 MG: 4 INJECTION INTRAVENOUS at 22:43

## 2021-05-05 RX ADMIN — BUTALBITAL, ACETAMINOPHEN AND CAFFEINE 1 TABLET: 50; 325; 40 TABLET ORAL at 19:14

## 2021-05-05 ASSESSMENT — ENCOUNTER SYMPTOMS
SORE THROAT: 0
VOMITING: 1
ABDOMINAL PAIN: 1
NAUSEA: 1
CONSTIPATION: 0
DIARRHEA: 0
SHORTNESS OF BREATH: 1

## 2021-05-05 ASSESSMENT — PAIN DESCRIPTION - ORIENTATION: ORIENTATION: RIGHT

## 2021-05-05 ASSESSMENT — PAIN DESCRIPTION - PAIN TYPE: TYPE: ACUTE PAIN

## 2021-05-05 ASSESSMENT — PAIN DESCRIPTION - LOCATION: LOCATION: ABDOMEN

## 2021-05-05 ASSESSMENT — PAIN DESCRIPTION - DIRECTION: RADIATING_TOWARDS: BACK

## 2021-05-05 ASSESSMENT — PAIN DESCRIPTION - FREQUENCY: FREQUENCY: CONTINUOUS

## 2021-05-05 ASSESSMENT — PAIN SCALES - GENERAL: PAINLEVEL_OUTOF10: 9

## 2021-05-05 ASSESSMENT — PAIN DESCRIPTION - DESCRIPTORS: DESCRIPTORS: PRESSURE

## 2021-05-05 NOTE — ED NOTES
Bed: 44  Expected date:   Expected time:   Means of arrival:   Comments:     Jo Abarca RN  05/05/21 0520

## 2021-05-05 NOTE — ED PROVIDER NOTES
[facility] [facility]     Emergency Department     Faculty Attestation        I performed a history and physical examination of the patient and discussed management with the resident. I reviewed the residents note and agree with the documented findings and plan of care. Any areas of disagreement are noted on the chart. I was personally present for the key portions of any procedures. I have documented in the chart those procedures where I was not present during the key portions. I have reviewed the emergency nurses triage note. I agree with the chief complaint, past medical history, past surgical history, allergies, medications, social and family history as documented unless otherwise noted below. For mid-level providers such as nurse practitioners as well as physicians assistants:    I have personally seen and evaluated the patient. I find the patient's history and physical exam are consistent with NP/PA documentation. I agree with the care provided, treatment rendered, disposition, & follow-up plan. Additional findings are as noted. Vital Signs: BP (!) 154/93   Pulse 111   Temp 98.8 °F (37.1 °C)   Resp 18   Ht 4' 11\" (1.499 m)   Wt 91 lb (41.3 kg)   LMP 03/18/2015   SpO2 100%   BMI 18.38 kg/m²   PCP:  No primary care provider on file. Pertinent Comments:     Patient presents with numerous and varied complaints her main complaint is abdominal pain with \"spitting up\". Patient admitted for CRYSTAL.   Is slightly tachycardic but otherwise afebrile nontoxic on exam.      Critical Care  None          Robbie Garnica MD  [facility]  Attending Emergency Medicine Physician              Barber Isaacs MD  05/05/21 9350

## 2021-05-05 NOTE — ED PROVIDER NOTES
[facility] ED  Emergency Department Encounter  EmergencyMedicine Resident     Pt Name:Emi Navarrete  MRN: [id number]  Birthdate 1955  Date of evaluation: 5/5/21  PCP:  No primary care provider on file. CHIEF COMPLAINT       Chief Complaint   Patient presents with    Abdominal Pain       HISTORY OF PRESENT ILLNESS  (Location/Symptom, Timing/Onset, Context/Setting, Quality, Duration, Modifying Factors, Severity.)      Emi Daniels is a 74 y.o. female who presents to the emergency department with right-sided upper abdominal pain that radiates to the back. Patient is a poor historian. She is primarily concerned about \"my appendix\" and states that she was told her appendix need to be taken out but she has not yet gotten it removed due to health concerns with surgery. Throughout interview patient is spitting up into an emesis bag stating that she does not want to swallow but is unclear as to why. Denies recorded fever, chills, HEENT symptoms, chest pain, shortness of breath, problems urinating or with bowel movements, or numbness or tingling anywhere. Admitted from April 21 April 27 of this year due to acute kidney injury. Patient was homeless at the time and discharged to subacute rehab. She states she is still homeless and has previously lived at [facility] but states she can no longer go there due to showing up late one day and them saying they would not let her back in.     PAST MEDICAL / SURGICAL / SOCIAL / FAMILY HISTORY      has a past medical history of Appendicitis, Cerebral artery occlusion with cerebral infarction ([address] CHF (congestive heart failure) (HCC), Chronic respiratory failure with hypoxia and hypercapnia (HCC), Chronic rhinitis, Cigarette smoker, COPD (chronic obstructive pulmonary disease) ([address] Depression, Dysphagia, Essential hypertension, GERD (gastroesophageal reflux disease), Heart failure, diastolic, with acute decompensation ([address] Hepatitis C, connections     Talks on phone: Not on file     Gets together: Not on file     Attends Alevism service: Not on file     Active member of club or organization: Not on file     Attends meetings of clubs or organizations: Not on file     Relationship status: Not on file    Intimate partner violence     Fear of current or ex partner: Not on file     Emotionally abused: Not on file     Physically abused: Not on file     Forced sexual activity: Not on file   Other Topics Concern    Not on file   Social History Narrative    Not on file       Family History   Problem Relation Age of Onset    Breast Cancer Mother     Heart Disease Father        Allergies:  Aspirin and Jorge Font [cephalexin]    Home Medications:  Prior to Admission medications    Medication Sig Start Date End Date Taking? Authorizing Provider   famotidine (PEPCID) 20 MG tablet Take 1 tablet by mouth 2 times daily 4/6/21 Delane Dess, DO   mometasone-formoterol [facility]) 100-5 MCG/ACT inhaler Inhale 2 puffs into the lungs 2 times daily 4/6/21 Delane Dess, DO   mirtazapine (REMERON) 30 MG tablet Take 1 tablet by mouth nightly 4/6/21 Delane Dess, DO   lisinopril (PRINIVIL;ZESTRIL) 10 MG tablet Take 1 tablet by mouth daily 4/6/21 Delane Dess, DO   gabapentin (NEURONTIN) 300 MG capsule Take 1 capsule by mouth 3 times daily for 30 days.  4/6/21 5/6/21 Delane Dess, DO   ferrous sulfate (MELISSA-HAO) 325 (65 Fe) MG tablet Take 1 tablet by mouth daily 4/6/21 Delane Dess, DO   clopidogrel (PLAVIX) 75 MG tablet Take 1 tablet by mouth daily 4/6/21 Delane Dess, DO   carvedilol (COREG) 6.25 MG tablet Take 1 tablet by mouth 2 times daily (with meals) 4/6/21 Delane Dess, DO   atorvastatin (LIPITOR) 40 MG tablet Take 1 tablet by mouth nightly 4/6/21 Delane Dess, DO   apixaban (ELIQUIS) 5 MG TABS tablet Take 1 tablet by mouth 2 times daily 4/6/21 Delane Dess, DO   amitriptyline (ELAVIL) 10 MG tablet Take 1 tablet by mouth nightly 4/6/21 Delane Dess, DO   albuterol sulfate HFA (PROVENTIL HFA) 108 (90 Base) MCG/ACT inhaler Inhale 1-2 puffs into the lungs once for 1 dose 4/6/21 4/6/21  Juan A España DO   butalbital-acetaminophen-caffeine (FIORICET, ESGIC) -69 MG per tablet Take 1 tablet by mouth every 12 hours for 5 doses 3/28/21 3/31/21  Venice Roach MD   Elastic Bandages & Supports (LUMBAR BACK BRACE/SUPPORT PAD) MISC 1 Units by Does not apply route daily 2/10/21 2/10/22  Narda Salgado MD   amLODIPine (NORVASC) 10 MG tablet Take 1 tablet by mouth daily 2/9/21 2/9/21  Estefania Shirley MD   Sennosides (SENNA) 8.6 MG CAPS 8.6 capsules 8/2/20   Ramon Dye MD   [facility] [place] [facility]) MISC 1 Device by Does not apply route once for 1 dose 8/5/19 8/5/19  Mary Chance PA-C   ipratropium-albuterol (DUONEB) 0.5-2.5 (3) MG/3ML SOLN nebulizer solution Inhale 3 mLs into the lungs every 4 hours as needed for Shortness of Breath 1/11/19   Rafael Mackenzie MD   folic acid (FOLVITE) 1 MG tablet Take 1 tablet by mouth daily 10/13/18   Kalpesh Hampton MD   vitamin B-1 100 MG tablet Take 1 tablet by mouth daily 10/13/18   Kalpesh Hampton MD   vitamin B-12 250 MCG tablet Take 1 tablet by mouth daily 10/13/18   Kalpehs Hampton MD   Multiple Vitamins-Minerals (THERAPEUTIC MULTIVITAMIN-MINERALS) tablet Take 1 tablet by mouth daily 8/12/18   Debby Suarez MD       REVIEW OF SYSTEMS    (2-9 systems for level 4, 10 or more for level 5)      Review of Systems   Constitutional: Positive for chills. Negative for fever. HENT: Negative for ear pain, hearing loss and sore throat. Eyes: Negative for visual disturbance. Respiratory: Positive for shortness of breath. Cardiovascular: Positive for chest pain. Gastrointestinal: Positive for abdominal pain, nausea and vomiting (However pt is only spitting up saliva in ED). Negative for constipation and diarrhea. Genitourinary: Negative for difficulty urinating and dysuria.    Musculoskeletal: Negative for arthralgias and myalgias. Neurological: Negative for numbness. Psychiatric/Behavioral: Negative for agitation and confusion. PHYSICAL EXAM   (up to 7 for level 4, 8 or more for level 5)      INITIAL VITALS:   BP (!) 154/93   Pulse 111   Temp 98.8 °F (37.1 °C)   Resp 18   Ht 4' 11\" (1.499 m)   Wt 91 lb (41.3 kg)   LMP 03/18/2015   SpO2 100%   BMI 18.38 kg/m²     Physical Exam  Vitals signs and nursing note reviewed. Constitutional:       General: She is not in acute distress. Appearance: Normal appearance. She is well-developed. She is not ill-appearing or diaphoretic. Comments: Patient is spitting up into an emesis bag without vomiting   HENT:      Head: Normocephalic and atraumatic. Right Ear: External ear normal.      Left Ear: External ear normal.      Nose: Nose normal.      Mouth/Throat:      Mouth: Mucous membranes are moist.   Eyes:      Extraocular Movements: Extraocular movements intact. Conjunctiva/sclera: Conjunctivae normal.   Neck:      Musculoskeletal: Normal range of motion and neck supple. Trachea: No tracheal deviation. Cardiovascular:      Rate and Rhythm: Regular rhythm. Tachycardia present. Heart sounds: Normal heart sounds. No murmur. No friction rub. No gallop. Pulmonary:      Effort: Pulmonary effort is normal. No respiratory distress. Breath sounds: Normal breath sounds. No wheezing, rhonchi or rales. Abdominal:      General: Abdomen is flat. There is no distension. Palpations: Abdomen is soft. There is no mass. Tenderness: There is abdominal tenderness (Right upper quadrant with positive Tompkins sign). There is no guarding or rebound. Comments: Weakly positive psoas and obturator   Musculoskeletal: Normal range of motion. General: No swelling, tenderness (no posterior calf TTP), deformity or signs of injury. Right lower leg: No edema. Left lower leg: No edema. Skin:     General: Skin is warm and dry. Capillary Refill: Capillary refill takes less than 2 seconds. Coloration: Skin is not jaundiced. Findings: No bruising or lesion. Neurological:      General: No focal deficit present. Mental Status: She is alert and oriented to person, place, and time. Mental status is at baseline. Motor: No abnormal muscle tone. DIFFERENTIAL  DIAGNOSIS     PLAN (LABS / IMAGING / EKG):  Orders Placed This Encounter   Procedures    COVID-19, Rapid    XR CHEST PORTABLE    CT CHEST PULMONARY EMBOLISM W CONTRAST    CBC Auto Differential    Comprehensive Metabolic Panel w/ Reflex to MG    Lipase    Troponin    Brain Natriuretic Peptide    CK    MYOGLOBIN, SERUM    D-Dimer, Quantitative    EKG 12 Lead    Insert peripheral IV    PATIENT STATUS (FROM ED OR OR/PROCEDURAL) Observation       MEDICATIONS ORDERED:  Orders Placed This Encounter   Medications    lactated ringers infusion 1,000 mL    ondansetron (ZOFRAN) injection 4 mg    morphine injection 4 mg    butalbital-acetaminophen-caffeine (FIORICET, ESGIC) per tablet 1 tablet    iopamidol (ISOVUE-370) 76 % injection 75 mL    morphine injection 4 mg    enoxaparin (LOVENOX) injection 40 mg       DDX: Emi Daniels is a 74 y.o. female who presents to the emergency department with right upper quadrant abdominal pain. Differential diagnosis includes biliary colic, cholecystitis, choledocholithiasis, hepatitis, ACS, PE, pneumonia, malnourishment    DIAGNOSTIC RESULTS / EMERGENCY DEPARTMENT COURSE / MDM   LAB RESULTS:  Results for orders placed or performed during the hospital encounter of 05/05/21   COVID-19, Rapid    Specimen: Nasopharyngeal Swab   Result Value Ref Range    Specimen Description . NASOPHARYNGEAL SWAB     SARS-CoV-2, Rapid Not Detected Not Detected   CBC Auto Differential   Result Value Ref Range    WBC 10.3 3.5 - 11.3 k/uL    RBC 4.01 3.95 - 5.11 m/uL    Hemoglobin 13.3 11.9 - 15.1 g/dL    Hematocrit 42.4 36.3 - 47.1 %    MCV 105.7 (H) 82.6 - 102.9 fL    MCH 33.2 25.2 - 33.5 pg    MCHC 31.4 28.4 - 34.8 g/dL    RDW 15.1 (H) 11.8 - 14.4 %    Platelets 665 (H) 749 - 453 k/uL    MPV 9.5 8.1 - 13.5 fL    NRBC Automated 0.0 0.0 per 100 WBC    Differential Type NOT REPORTED     WBC Morphology NOT REPORTED     RBC Morphology ANISOCYTOSIS PRESENT     Platelet Estimate NOT REPORTED     Seg Neutrophils 74 (H) 36 - 65 %    Lymphocytes 18 (L) 24 - 43 %    Monocytes 6 3 - 12 %    Eosinophils % 1 1 - 4 %    Basophils 0 0 - 2 %    Immature Granulocytes 1 (H) 0 %    Segs Absolute 7.65 1.50 - 8.10 k/uL    Absolute Lymph # 1.90 1.10 - 3.70 k/uL    Absolute Mono # 0.63 0.10 - 1.20 k/uL    Absolute Eos # 0.05 0.00 - 0.44 k/uL    Basophils Absolute <0.03 0.00 - 0.20 k/uL    Absolute Immature Granulocyte 0.07 0.00 - 0.30 k/uL   Comprehensive Metabolic Panel w/ Reflex to MG   Result Value Ref Range    Glucose 70 70 - 99 mg/dL    BUN 15 8 - 23 mg/dL    CREATININE 0.76 0.50 - 0.90 mg/dL    Bun/Cre Ratio NOT REPORTED 9 - 20    Calcium 9.8 8.6 - 10.4 mg/dL    Sodium 134 (L) 135 - 144 mmol/L    Potassium 3.7 3.7 - 5.3 mmol/L    Chloride 96 (L) 98 - 107 mmol/L    CO2 19 (L) 20 - 31 mmol/L    Anion Gap 19 (H) 9 - 17 mmol/L    Alkaline Phosphatase 127 (H) 35 - 104 U/L    ALT 41 (H) 5 - 33 U/L    AST 65 (H) <32 U/L    Total Bilirubin 0.57 0.3 - 1.2 mg/dL    Total Protein 9.9 (H) 6.4 - 8.3 g/dL    Albumin 4.7 3.5 - 5.2 g/dL    Albumin/Globulin Ratio 0.9 (L) 1.0 - 2.5    GFR Non-African American >60 >60 mL/min    GFR African American >60 >60 mL/min    GFR Comment          GFR Staging NOT REPORTED    Lipase   Result Value Ref Range    Lipase 56 13 - 60 U/L   Troponin   Result Value Ref Range    Troponin, High Sensitivity 13 0 - 14 ng/L    Troponin T NOT REPORTED <0.03 ng/mL    Troponin Interp NOT REPORTED    Troponin   Result Value Ref Range    Troponin, High Sensitivity 12 0 - 14 ng/L    Troponin T NOT REPORTED <0.03 ng/mL    Troponin Interp NOT REPORTED    Brain Natriuretic Peptide   Result Value Ref Range    Pro- (H) <300 pg/mL    BNP Interpretation Pro-BNP Reference Range:    CK   Result Value Ref Range    Total  (H) 26 - 192 U/L   MYOGLOBIN, SERUM   Result Value Ref Range    Myoglobin 81 (H) 25 - 58 ng/mL   D-Dimer, Quantitative   Result Value Ref Range    D-Dimer, Quant 3.01 mg/L FEU       IMPRESSION: Emi Daniels is a 74 y.o. female who presents to the emergency department with right upper quadrant abdominal pain radiating to the back. On examination she is tachycardic, thin appearing, with a positive Tompkins sign in the right upper quadrant. Patient is a poor historian and appears to be homeless. Thin appearance, questionable history raises the possibility of malnourishment, medication noncompliance, and examination is not entirely reassuring against PE or ACS. We will obtain cardiopulmonary work-up including D-dimer, abdominal labs, treat for symptoms. Dispo pending labs and imaging. RADIOLOGY:  Ct Abdomen W Contrast Additional Contrast? Radiologist Recommendation    Result Date: 4/23/2021  EXAMINATION: CT ABDOMEN WITH CONTRAST 4/23/2021 5:16 pm TECHNIQUE: CT of the abdomen was performed with the administration of intravenous contrast in the arterial and venous phases. Multiplanar reformatted images are provided for review. Dose modulation, iterative reconstruction, and/or weight based adjustment of the mA/kV was utilized to reduce the radiation dose to as low as reasonably achievable. COMPARISON: CT abdomen and pelvis 04/21/2021. CT abdomen and pelvis 01/17/2007. HISTORY: ORDERING SYSTEM PROVIDED HISTORY: pancreatic lesion TECHNOLOGIST PROVIDED HISTORY: pancreatic lesion FINDINGS: Lower Chest: Mild bibasilar atelectasis or scarring left greater than right. Organs: The liver is unremarkable. The gallbladder is decompressed. No biliary ductal dilatation. The spleen, bilateral adrenal glands, and bilateral kidneys are unremarkable.  A 2.5 x 2.4 x 2.2 cm peripherally enhancing lesion is again seen within the lesser sac either arising from or contacting the inferior aspect of the pancreatic body as measured on axial image 58 and coronal image 31. Central low density is seen. There is no significant change dating back to 01/17/2007. The pancreas is otherwise normal in appearance. GI/Bowel: No bowel obstruction or wall thickening identified. Peritoneum/Retroperitoneum: The abdominal aorta is normal in caliber with mild atherosclerotic vascular disease. No retroperitoneal or mesenteric lymphadenopathy is identified. No free air or fluid is seen in the abdomen. Bones/Soft Tissues: No acute osseous or soft tissue abnormality. No suspicious lytic or blastic osseous lesion. 2.5 cm peripherally enhancing lesion either arising from or contacting the inferior aspect of the pancreatic body within the lesser sac unchanged dating back to 2007. This is nonspecific but given the long-term stability is likely benign. Ct Abdomen Pelvis W Iv Contrast Additional Contrast? None    Result Date: 4/21/2021  EXAMINATION: CT OF THE ABDOMEN AND PELVIS WITH CONTRAST 4/21/2021 12:58 pm TECHNIQUE: CT of the abdomen and pelvis was performed with the administration of intravenous contrast. Multiplanar reformatted images are provided for review. Dose modulation, iterative reconstruction, and/or weight based adjustment of the mA/kV was utilized to reduce the radiation dose to as low as reasonably achievable. COMPARISON: CT abdomen and pelvis performed 04/03/2021.  HISTORY: ORDERING SYSTEM PROVIDED HISTORY: abdominal pain, recent FNA of pancreatic mass, on Anticoagulation TECHNOLOGIST PROVIDED HISTORY: abdominal pain, recent FNA of pancreatic mass, on Anticoagulation Decision Support Exception->Emergency Medical Condition (MA) Reason for Exam: abdominal pain, recent FNA of pancreatic mass, on Anticoagulation Acuity: Unknown Type of Exam: Unknown FINDINGS: Lower Chest: The signs or co-signs this chart in the absence of a cardiologist.    EMERGENCY DEPARTMENT COURSE:  ED Course as of May 05 2250   Wed May 05, 2021   2239 Patient's IV infiltrated and she is historically a very difficult stick. Questionable whether or not the patient has been taking her Eliquis at home and so we will plan for empiric dosing of 1 mg per kilo of Lovenox to ensure that the patient is anticoagulated in the event she does have a PE, admit with plan for PICC team consultation for IV, right upper quadrant ultrasound to explore etiology of right upper quadrant abdominal pain/lab findings, CTPE (since it could not be done in ED), GI consultation if needed, pain control and symptom monitoring.    [TS]      ED Course User Index  [TS] Vanna Simental MD       PROCEDURES:  None    CONSULTS:  IP CONSULT TO IV TEAM    CRITICAL CARE:  Please see attending note. FINAL IMPRESSION      1. Right upper quadrant abdominal pain    2. Shortness of breath          DISPOSITION / PLAN     DISPOSITION Admitted 05/05/2021 10:39:01 PM      PATIENT REFERRED TO:  No follow-up provider specified. DISCHARGE MEDICATIONS:  New Prescriptions    No medications on file       Vanna Simental MD  Emergency Medicine Resident    This patient was evaluated in the Emergency Department for symptoms described in the history of present illness. He/she was evaluated in the context of the global COVID-19 pandemic, which necessitated consideration that the patient might be at risk for infection with the SARS-CoV-2 virus that causes COVID-19. Institutional protocols and algorithms that pertain to the evaluation of patients at risk for COVID-19 are in a state of rapid change based on information released by regulatory bodies including the CDC and federal and state organizations. These policies and algorithms were followed during the patient's care in the ED.     (Please note that portions of thisnote were completed with a voice recognition program. Efforts were made to edit the dictations but occasionally words are mis-transcribed.)       Medhat Lenz MD  Resident  05/05/21 5920

## 2021-05-06 ENCOUNTER — APPOINTMENT (OUTPATIENT)
Dept: CT IMAGING | Age: 66
DRG: 282 | End: 2021-05-06
Payer: MEDICARE

## 2021-05-06 LAB
ABSOLUTE EOS #: 0.09 K/UL (ref 0–0.44)
ABSOLUTE IMMATURE GRANULOCYTE: 0.06 K/UL (ref 0–0.3)
ABSOLUTE LYMPH #: 1.66 K/UL (ref 1.1–3.7)
ABSOLUTE MONO #: 0.66 K/UL (ref 0.1–1.2)
ALBUMIN SERPL-MCNC: 4 G/DL (ref 3.5–5.2)
ALBUMIN/GLOBULIN RATIO: 1 (ref 1–2.5)
ALP BLD-CCNC: 106 U/L (ref 35–104)
ALT SERPL-CCNC: 34 U/L (ref 5–33)
ANION GAP SERPL CALCULATED.3IONS-SCNC: 11 MMOL/L (ref 9–17)
AST SERPL-CCNC: 54 U/L
BASOPHILS # BLD: 0 % (ref 0–2)
BASOPHILS ABSOLUTE: <0.03 K/UL (ref 0–0.2)
BILIRUB SERPL-MCNC: 0.51 MG/DL (ref 0.3–1.2)
BILIRUBIN DIRECT: 0.3 MG/DL
BILIRUBIN, INDIRECT: 0.21 MG/DL (ref 0–1)
BUN BLDV-MCNC: 17 MG/DL (ref 8–23)
BUN/CREAT BLD: ABNORMAL (ref 9–20)
CALCIUM SERPL-MCNC: 9.2 MG/DL (ref 8.6–10.4)
CHLORIDE BLD-SCNC: 101 MMOL/L (ref 98–107)
CO2: 21 MMOL/L (ref 20–31)
CREAT SERPL-MCNC: 0.74 MG/DL (ref 0.5–0.9)
DIFFERENTIAL TYPE: ABNORMAL
EKG ATRIAL RATE: 92 BPM
EKG P AXIS: 68 DEGREES
EKG P-R INTERVAL: 144 MS
EKG Q-T INTERVAL: 384 MS
EKG QRS DURATION: 68 MS
EKG QTC CALCULATION (BAZETT): 474 MS
EKG R AXIS: 69 DEGREES
EKG T AXIS: 78 DEGREES
EKG VENTRICULAR RATE: 92 BPM
EOSINOPHILS RELATIVE PERCENT: 1 % (ref 1–4)
GFR AFRICAN AMERICAN: >60 ML/MIN
GFR NON-AFRICAN AMERICAN: >60 ML/MIN
GFR SERPL CREATININE-BSD FRML MDRD: ABNORMAL ML/MIN/{1.73_M2}
GFR SERPL CREATININE-BSD FRML MDRD: ABNORMAL ML/MIN/{1.73_M2}
GLOBULIN: ABNORMAL G/DL (ref 1.5–3.8)
GLUCOSE BLD-MCNC: 78 MG/DL (ref 70–99)
HCT VFR BLD CALC: 37 % (ref 36.3–47.1)
HEMOGLOBIN: 11.9 G/DL (ref 11.9–15.1)
IMMATURE GRANULOCYTES: 1 %
INR BLD: 1
LYMPHOCYTES # BLD: 25 % (ref 24–43)
MCH RBC QN AUTO: 33.3 PG (ref 25.2–33.5)
MCHC RBC AUTO-ENTMCNC: 32.2 G/DL (ref 28.4–34.8)
MCV RBC AUTO: 103.6 FL (ref 82.6–102.9)
MONOCYTES # BLD: 10 % (ref 3–12)
NRBC AUTOMATED: 0 PER 100 WBC
PARTIAL THROMBOPLASTIN TIME: 26.2 SEC (ref 20.5–30.5)
PDW BLD-RTO: 14.9 % (ref 11.8–14.4)
PLATELET # BLD: 456 K/UL (ref 138–453)
PLATELET ESTIMATE: ABNORMAL
PMV BLD AUTO: 9.5 FL (ref 8.1–13.5)
POTASSIUM SERPL-SCNC: 4.4 MMOL/L (ref 3.7–5.3)
PROTHROMBIN TIME: 10.4 SEC (ref 9.1–12.3)
RBC # BLD: 3.57 M/UL (ref 3.95–5.11)
RBC # BLD: ABNORMAL 10*6/UL
SEG NEUTROPHILS: 63 % (ref 36–65)
SEGMENTED NEUTROPHILS ABSOLUTE COUNT: 4.23 K/UL (ref 1.5–8.1)
SODIUM BLD-SCNC: 133 MMOL/L (ref 135–144)
TOTAL PROTEIN: 7.9 G/DL (ref 6.4–8.3)
WBC # BLD: 6.7 K/UL (ref 3.5–11.3)
WBC # BLD: ABNORMAL 10*3/UL

## 2021-05-06 PROCEDURE — 85025 COMPLETE CBC W/AUTO DIFF WBC: CPT

## 2021-05-06 PROCEDURE — 80048 BASIC METABOLIC PNL TOTAL CA: CPT

## 2021-05-06 PROCEDURE — 97166 OT EVAL MOD COMPLEX 45 MIN: CPT

## 2021-05-06 PROCEDURE — 97162 PT EVAL MOD COMPLEX 30 MIN: CPT

## 2021-05-06 PROCEDURE — 6370000000 HC RX 637 (ALT 250 FOR IP): Performed by: EMERGENCY MEDICINE

## 2021-05-06 PROCEDURE — 93010 ELECTROCARDIOGRAM REPORT: CPT | Performed by: INTERNAL MEDICINE

## 2021-05-06 PROCEDURE — G0378 HOSPITAL OBSERVATION PER HR: HCPCS

## 2021-05-06 PROCEDURE — 74177 CT ABD & PELVIS W/CONTRAST: CPT

## 2021-05-06 PROCEDURE — 76937 US GUIDE VASCULAR ACCESS: CPT

## 2021-05-06 PROCEDURE — 6360000002 HC RX W HCPCS: Performed by: EMERGENCY MEDICINE

## 2021-05-06 PROCEDURE — 96376 TX/PRO/DX INJ SAME DRUG ADON: CPT

## 2021-05-06 PROCEDURE — 97530 THERAPEUTIC ACTIVITIES: CPT

## 2021-05-06 PROCEDURE — 6360000002 HC RX W HCPCS: Performed by: STUDENT IN AN ORGANIZED HEALTH CARE EDUCATION/TRAINING PROGRAM

## 2021-05-06 PROCEDURE — 97535 SELF CARE MNGMENT TRAINING: CPT

## 2021-05-06 PROCEDURE — 85730 THROMBOPLASTIN TIME PARTIAL: CPT

## 2021-05-06 PROCEDURE — 94640 AIRWAY INHALATION TREATMENT: CPT

## 2021-05-06 PROCEDURE — 36415 COLL VENOUS BLD VENIPUNCTURE: CPT

## 2021-05-06 PROCEDURE — 96372 THER/PROPH/DIAG INJ SC/IM: CPT

## 2021-05-06 PROCEDURE — 6370000000 HC RX 637 (ALT 250 FOR IP): Performed by: STUDENT IN AN ORGANIZED HEALTH CARE EDUCATION/TRAINING PROGRAM

## 2021-05-06 PROCEDURE — 96375 TX/PRO/DX INJ NEW DRUG ADDON: CPT

## 2021-05-06 PROCEDURE — 6360000004 HC RX CONTRAST MEDICATION: Performed by: EMERGENCY MEDICINE

## 2021-05-06 PROCEDURE — 80076 HEPATIC FUNCTION PANEL: CPT

## 2021-05-06 PROCEDURE — 71260 CT THORAX DX C+: CPT

## 2021-05-06 PROCEDURE — 85610 PROTHROMBIN TIME: CPT

## 2021-05-06 PROCEDURE — 2580000003 HC RX 258: Performed by: EMERGENCY MEDICINE

## 2021-05-06 RX ORDER — ACETAMINOPHEN 325 MG/1
650 TABLET ORAL EVERY 4 HOURS PRN
Status: DISCONTINUED | OUTPATIENT
Start: 2021-05-06 | End: 2021-05-13 | Stop reason: HOSPADM

## 2021-05-06 RX ORDER — FENTANYL CITRATE 50 UG/ML
50 INJECTION, SOLUTION INTRAMUSCULAR; INTRAVENOUS EVERY 4 HOURS PRN
Status: DISCONTINUED | OUTPATIENT
Start: 2021-05-06 | End: 2021-05-06

## 2021-05-06 RX ORDER — SODIUM CHLORIDE 0.9 % (FLUSH) 0.9 %
5-40 SYRINGE (ML) INJECTION PRN
Status: DISCONTINUED | OUTPATIENT
Start: 2021-05-06 | End: 2021-05-13 | Stop reason: HOSPADM

## 2021-05-06 RX ORDER — AMITRIPTYLINE HYDROCHLORIDE 10 MG/1
10 TABLET, FILM COATED ORAL NIGHTLY
Status: DISCONTINUED | OUTPATIENT
Start: 2021-05-06 | End: 2021-05-13 | Stop reason: HOSPADM

## 2021-05-06 RX ORDER — MORPHINE SULFATE 4 MG/ML
4 INJECTION, SOLUTION INTRAMUSCULAR; INTRAVENOUS
Status: DISCONTINUED | OUTPATIENT
Start: 2021-05-06 | End: 2021-05-06

## 2021-05-06 RX ORDER — MORPHINE SULFATE 2 MG/ML
2 INJECTION, SOLUTION INTRAMUSCULAR; INTRAVENOUS
Status: DISCONTINUED | OUTPATIENT
Start: 2021-05-06 | End: 2021-05-06

## 2021-05-06 RX ORDER — FENTANYL CITRATE 50 UG/ML
50 INJECTION, SOLUTION INTRAMUSCULAR; INTRAVENOUS EVERY 4 HOURS PRN
Status: DISCONTINUED | OUTPATIENT
Start: 2021-05-06 | End: 2021-05-13 | Stop reason: HOSPADM

## 2021-05-06 RX ORDER — OXYCODONE HYDROCHLORIDE 5 MG/1
5 TABLET ORAL EVERY 4 HOURS PRN
Status: DISCONTINUED | OUTPATIENT
Start: 2021-05-06 | End: 2021-05-11

## 2021-05-06 RX ORDER — ATORVASTATIN CALCIUM 40 MG/1
40 TABLET, FILM COATED ORAL NIGHTLY
Status: DISCONTINUED | OUTPATIENT
Start: 2021-05-06 | End: 2021-05-13 | Stop reason: HOSPADM

## 2021-05-06 RX ORDER — LISINOPRIL 10 MG/1
10 TABLET ORAL DAILY
Status: DISCONTINUED | OUTPATIENT
Start: 2021-05-06 | End: 2021-05-13 | Stop reason: HOSPADM

## 2021-05-06 RX ORDER — SODIUM CHLORIDE 0.9 % (FLUSH) 0.9 %
5-40 SYRINGE (ML) INJECTION EVERY 12 HOURS SCHEDULED
Status: DISCONTINUED | OUTPATIENT
Start: 2021-05-06 | End: 2021-05-13 | Stop reason: HOSPADM

## 2021-05-06 RX ORDER — FAMOTIDINE 20 MG/1
20 TABLET, FILM COATED ORAL 2 TIMES DAILY
Status: DISCONTINUED | OUTPATIENT
Start: 2021-05-06 | End: 2021-05-13 | Stop reason: HOSPADM

## 2021-05-06 RX ORDER — IPRATROPIUM BROMIDE AND ALBUTEROL SULFATE 2.5; .5 MG/3ML; MG/3ML
1 SOLUTION RESPIRATORY (INHALATION) EVERY 4 HOURS PRN
Status: DISCONTINUED | OUTPATIENT
Start: 2021-05-06 | End: 2021-05-13 | Stop reason: HOSPADM

## 2021-05-06 RX ORDER — CLOPIDOGREL BISULFATE 75 MG/1
75 TABLET ORAL DAILY
Status: DISCONTINUED | OUTPATIENT
Start: 2021-05-06 | End: 2021-05-13 | Stop reason: HOSPADM

## 2021-05-06 RX ORDER — FOLIC ACID 1 MG/1
1 TABLET ORAL DAILY
Status: DISCONTINUED | OUTPATIENT
Start: 2021-05-06 | End: 2021-05-13 | Stop reason: HOSPADM

## 2021-05-06 RX ORDER — CHOLECALCIFEROL (VITAMIN D3) 125 MCG
250 CAPSULE ORAL DAILY
Status: DISCONTINUED | OUTPATIENT
Start: 2021-05-06 | End: 2021-05-13 | Stop reason: HOSPADM

## 2021-05-06 RX ORDER — BUTALBITAL, ACETAMINOPHEN AND CAFFEINE 50; 325; 40 MG/1; MG/1; MG/1
1 TABLET ORAL EVERY 12 HOURS
Status: DISCONTINUED | OUTPATIENT
Start: 2021-05-06 | End: 2021-05-13 | Stop reason: HOSPADM

## 2021-05-06 RX ORDER — MIRTAZAPINE 30 MG/1
30 TABLET, FILM COATED ORAL NIGHTLY
Status: DISCONTINUED | OUTPATIENT
Start: 2021-05-06 | End: 2021-05-13 | Stop reason: HOSPADM

## 2021-05-06 RX ORDER — SODIUM CHLORIDE 9 MG/ML
25 INJECTION, SOLUTION INTRAVENOUS PRN
Status: DISCONTINUED | OUTPATIENT
Start: 2021-05-06 | End: 2021-05-13 | Stop reason: HOSPADM

## 2021-05-06 RX ORDER — M-VIT,TX,IRON,MINS/CALC/FOLIC 27MG-0.4MG
1 TABLET ORAL DAILY
Status: DISCONTINUED | OUTPATIENT
Start: 2021-05-06 | End: 2021-05-13 | Stop reason: HOSPADM

## 2021-05-06 RX ORDER — LANOLIN ALCOHOL/MO/W.PET/CERES
325 CREAM (GRAM) TOPICAL DAILY
Status: DISCONTINUED | OUTPATIENT
Start: 2021-05-06 | End: 2021-05-13 | Stop reason: HOSPADM

## 2021-05-06 RX ORDER — BUDESONIDE AND FORMOTEROL FUMARATE DIHYDRATE 80; 4.5 UG/1; UG/1
2 AEROSOL RESPIRATORY (INHALATION) 2 TIMES DAILY
Status: DISCONTINUED | OUTPATIENT
Start: 2021-05-06 | End: 2021-05-13 | Stop reason: HOSPADM

## 2021-05-06 RX ORDER — GABAPENTIN 300 MG/1
300 CAPSULE ORAL 3 TIMES DAILY
Status: DISCONTINUED | OUTPATIENT
Start: 2021-05-06 | End: 2021-05-13 | Stop reason: HOSPADM

## 2021-05-06 RX ORDER — ALBUTEROL SULFATE 2.5 MG/3ML
2.5 SOLUTION RESPIRATORY (INHALATION) EVERY 4 HOURS PRN
Status: DISCONTINUED | OUTPATIENT
Start: 2021-05-06 | End: 2021-05-13 | Stop reason: HOSPADM

## 2021-05-06 RX ORDER — CARVEDILOL 6.25 MG/1
6.25 TABLET ORAL 2 TIMES DAILY WITH MEALS
Status: DISCONTINUED | OUTPATIENT
Start: 2021-05-06 | End: 2021-05-13 | Stop reason: HOSPADM

## 2021-05-06 RX ORDER — ONDANSETRON 4 MG/1
4 TABLET, ORALLY DISINTEGRATING ORAL EVERY 8 HOURS PRN
Status: DISCONTINUED | OUTPATIENT
Start: 2021-05-06 | End: 2021-05-13 | Stop reason: HOSPADM

## 2021-05-06 RX ORDER — GAUZE BANDAGE 2" X 2"
100 BANDAGE TOPICAL DAILY
Status: DISCONTINUED | OUTPATIENT
Start: 2021-05-06 | End: 2021-05-13 | Stop reason: HOSPADM

## 2021-05-06 RX ORDER — ONDANSETRON 2 MG/ML
4 INJECTION INTRAMUSCULAR; INTRAVENOUS EVERY 8 HOURS PRN
Status: DISCONTINUED | OUTPATIENT
Start: 2021-05-06 | End: 2021-05-06

## 2021-05-06 RX ADMIN — DESMOPRESSIN ACETATE 40 MG: 0.2 TABLET ORAL at 21:02

## 2021-05-06 RX ADMIN — IOPAMIDOL 75 ML: 755 INJECTION, SOLUTION INTRAVENOUS at 10:33

## 2021-05-06 RX ADMIN — CLOPIDOGREL 75 MG: 75 TABLET, FILM COATED ORAL at 08:31

## 2021-05-06 RX ADMIN — FOLIC ACID 1 MG: 1 TABLET ORAL at 08:31

## 2021-05-06 RX ADMIN — BUTALBITAL, ACETAMINOPHEN AND CAFFEINE 1 TABLET: 50; 325; 40 TABLET ORAL at 13:39

## 2021-05-06 RX ADMIN — OXYCODONE HYDROCHLORIDE 5 MG: 5 TABLET ORAL at 21:02

## 2021-05-06 RX ADMIN — SODIUM CHLORIDE, PRESERVATIVE FREE 10 ML: 5 INJECTION INTRAVENOUS at 08:35

## 2021-05-06 RX ADMIN — FAMOTIDINE 20 MG: 20 TABLET, FILM COATED ORAL at 08:29

## 2021-05-06 RX ADMIN — APIXABAN 5 MG: 5 TABLET, FILM COATED ORAL at 21:02

## 2021-05-06 RX ADMIN — FERROUS SULFATE TAB EC 325 MG (65 MG FE EQUIVALENT) 325 MG: 325 (65 FE) TABLET DELAYED RESPONSE at 08:31

## 2021-05-06 RX ADMIN — GABAPENTIN 300 MG: 300 CAPSULE ORAL at 21:02

## 2021-05-06 RX ADMIN — Medication 1 TABLET: at 08:33

## 2021-05-06 RX ADMIN — OXYCODONE HYDROCHLORIDE 5 MG: 5 TABLET ORAL at 03:15

## 2021-05-06 RX ADMIN — GABAPENTIN 300 MG: 300 CAPSULE ORAL at 13:39

## 2021-05-06 RX ADMIN — BUDESONIDE AND FORMOTEROL FUMARATE DIHYDRATE 2 PUFF: 80; 4.5 AEROSOL RESPIRATORY (INHALATION) at 09:02

## 2021-05-06 RX ADMIN — Medication 250 MCG: at 08:33

## 2021-05-06 RX ADMIN — SODIUM CHLORIDE, PRESERVATIVE FREE 10 ML: 5 INJECTION INTRAVENOUS at 21:03

## 2021-05-06 RX ADMIN — LISINOPRIL 10 MG: 10 TABLET ORAL at 08:31

## 2021-05-06 RX ADMIN — BUDESONIDE AND FORMOTEROL FUMARATE DIHYDRATE 2 PUFF: 80; 4.5 AEROSOL RESPIRATORY (INHALATION) at 21:38

## 2021-05-06 RX ADMIN — MIRTAZAPINE 30 MG: 30 TABLET, FILM COATED ORAL at 21:02

## 2021-05-06 RX ADMIN — CARVEDILOL 6.25 MG: 6.25 TABLET, FILM COATED ORAL at 08:33

## 2021-05-06 RX ADMIN — GABAPENTIN 300 MG: 300 CAPSULE ORAL at 08:30

## 2021-05-06 RX ADMIN — APIXABAN 5 MG: 5 TABLET, FILM COATED ORAL at 08:34

## 2021-05-06 RX ADMIN — AMITRIPTYLINE HYDROCHLORIDE 10 MG: 10 TABLET, FILM COATED ORAL at 21:02

## 2021-05-06 RX ADMIN — Medication 100 MG: at 08:30

## 2021-05-06 RX ADMIN — BUTALBITAL, ACETAMINOPHEN AND CAFFEINE 1 TABLET: 50; 325; 40 TABLET ORAL at 03:15

## 2021-05-06 RX ADMIN — FENTANYL CITRATE 50 MCG: 50 INJECTION, SOLUTION INTRAMUSCULAR; INTRAVENOUS at 13:17

## 2021-05-06 RX ADMIN — FENTANYL CITRATE 50 MCG: 50 INJECTION, SOLUTION INTRAMUSCULAR; INTRAVENOUS at 08:37

## 2021-05-06 RX ADMIN — FAMOTIDINE 20 MG: 20 TABLET, FILM COATED ORAL at 21:02

## 2021-05-06 RX ADMIN — CARVEDILOL 6.25 MG: 6.25 TABLET, FILM COATED ORAL at 17:33

## 2021-05-06 ASSESSMENT — PAIN DESCRIPTION - PAIN TYPE
TYPE: ACUTE PAIN

## 2021-05-06 ASSESSMENT — PAIN SCALES - GENERAL
PAINLEVEL_OUTOF10: 3
PAINLEVEL_OUTOF10: 9

## 2021-05-06 ASSESSMENT — PAIN - FUNCTIONAL ASSESSMENT: PAIN_FUNCTIONAL_ASSESSMENT: ACTIVITIES ARE NOT PREVENTED

## 2021-05-06 ASSESSMENT — PAIN DESCRIPTION - DESCRIPTORS
DESCRIPTORS: ACHING
DESCRIPTORS: DISCOMFORT

## 2021-05-06 ASSESSMENT — PAIN DESCRIPTION - ORIENTATION: ORIENTATION: RIGHT

## 2021-05-06 ASSESSMENT — PAIN SCALES - WONG BAKER: WONGBAKER_NUMERICALRESPONSE: 2

## 2021-05-06 NOTE — PROGRESS NOTES
Was informed by RN that patient lost IV access, transitioning to p.o. pain medication until patient received IV per PICC team later today    Electronically signed by Skip Gant DO on 5/6/2021 at 3:06 AM

## 2021-05-06 NOTE — PROGRESS NOTES
Occupational Therapy Not Seen Note    DATE: 2021  Name: Horace Lentz  : 1955  MRN: [id number]    Patient not available for Occupational Therapy due to:    Testing: CT of chest to r/o PE.  OT will await results    Next Scheduled Treatment: PM or 2021    Electronically signed by Darcel Curling, OTR/L on 2021 at 11:52 AM

## 2021-05-06 NOTE — PROGRESS NOTES
[address]  CDU / OBSERVATION ENCOUNTER  ATTENDING NOTE       I performed a history and physical examination of the patient and discussed management with the resident or midlevel provider. I reviewed the resident or midlevel provider's note and agree with the documented findings and plan of care. Any areas of disagreement are noted on the chart. I was personally present for the key portions of any procedures. I have documented in the chart those procedures where I was not present during the key portions. I have reviewed the nurses notes. I agree with the chief complaint, past medical history, past surgical history, allergies, medications, social and family history as documented unless otherwise noted below. The Family history, social history, and ROS are effectively unchanged since admission unless noted elsewhere in the chart. Patient with improvement in terms of symptoms. Patient has no clear follow-up. Patient seems to have done poorly with follow-up as an outpatient. Patient is left AMA previously. Patient was to be at a subacute rehab facility and she decided not to stay. Patient has no place to live. Patient prefers to live at [facility] for now. Patient does have an oncology appointment and pain clinic appointment but does not have a PCP. We will try to make arrangements for outpatient follow-up and safe place to live. Patient for reevaluation.     Scherry Primrose MD  Attending Emergency  Physician

## 2021-05-06 NOTE — ED PROVIDER NOTES
[facility] ED  Emergency Department  Emergency Medicine Resident Sign-out     Care of Emi Daniels was assumed from Dr. Alberto Puri and is being seen for Abdominal Pain  . The patient's initial evaluation and plan have been discussed with the prior provider who initially evaluated the patient. EMERGENCY DEPARTMENT COURSE / MEDICAL DECISION MAKING:       MEDICATIONS GIVEN:  Orders Placed This Encounter   Medications    lactated ringers infusion 1,000 mL    ondansetron (ZOFRAN) injection 4 mg    morphine injection 4 mg    butalbital-acetaminophen-caffeine (FIORICET, ESGIC) per tablet 1 tablet    iopamidol (ISOVUE-370) 76 % injection 75 mL    morphine injection 4 mg    enoxaparin (LOVENOX) injection 40 mg       LABS / RADIOLOGY:     Labs Reviewed   CBC WITH AUTO DIFFERENTIAL - Abnormal; Notable for the following components:       Result Value    .7 (*)     RDW 15.1 (*)     Platelets 422 (*)     Seg Neutrophils 74 (*)     Lymphocytes 18 (*)     Immature Granulocytes 1 (*)     All other components within normal limits   COMPREHENSIVE METABOLIC PANEL W/ REFLEX TO MG FOR LOW K - Abnormal; Notable for the following components:    Sodium 134 (*)     Chloride 96 (*)     CO2 19 (*)     Anion Gap 19 (*)     Alkaline Phosphatase 127 (*)     ALT 41 (*)     AST 65 (*)     Total Protein 9.9 (*)     Albumin/Globulin Ratio 0.9 (*)     All other components within normal limits   BRAIN NATRIURETIC PEPTIDE - Abnormal; Notable for the following components:    Pro- (*)     All other components within normal limits   CK - Abnormal; Notable for the following components:     Total  (*)     All other components within normal limits   MYOGLOBIN, SERUM - Abnormal; Notable for the following components:    Myoglobin 81 (*)     All other components within normal limits   COVID-19, RAPID   LIPASE   TROPONIN   TROPONIN   D-DIMER, QUANTITATIVE       Ct Abdomen W Contrast Additional Contrast? Radiologist Recommendation    Result Date: 4/23/2021  EXAMINATION: CT ABDOMEN WITH CONTRAST 4/23/2021 5:16 pm TECHNIQUE: CT of the abdomen was performed with the administration of intravenous contrast in the arterial and venous phases. Multiplanar reformatted images are provided for review. Dose modulation, iterative reconstruction, and/or weight based adjustment of the mA/kV was utilized to reduce the radiation dose to as low as reasonably achievable. COMPARISON: CT abdomen and pelvis 04/21/2021. CT abdomen and pelvis 01/17/2007. HISTORY: ORDERING SYSTEM PROVIDED HISTORY: pancreatic lesion TECHNOLOGIST PROVIDED HISTORY: pancreatic lesion FINDINGS: Lower Chest: Mild bibasilar atelectasis or scarring left greater than right. Organs: The liver is unremarkable. The gallbladder is decompressed. No biliary ductal dilatation. The spleen, bilateral adrenal glands, and bilateral kidneys are unremarkable. A 2.5 x 2.4 x 2.2 cm peripherally enhancing lesion is again seen within the lesser sac either arising from or contacting the inferior aspect of the pancreatic body as measured on axial image 58 and coronal image 31. Central low density is seen. There is no significant change dating back to 01/17/2007. The pancreas is otherwise normal in appearance. GI/Bowel: No bowel obstruction or wall thickening identified. Peritoneum/Retroperitoneum: The abdominal aorta is normal in caliber with mild atherosclerotic vascular disease. No retroperitoneal or mesenteric lymphadenopathy is identified. No free air or fluid is seen in the abdomen. Bones/Soft Tissues: No acute osseous or soft tissue abnormality. No suspicious lytic or blastic osseous lesion. 2.5 cm peripherally enhancing lesion either arising from or contacting the inferior aspect of the pancreatic body within the lesser sac unchanged dating back to 2007. This is nonspecific but given the long-term stability is likely benign.      Ct Abdomen Pelvis W Iv Contrast Additional Contrast? None    Result Date: 4/21/2021  EXAMINATION: CT OF THE ABDOMEN AND PELVIS WITH CONTRAST 4/21/2021 12:58 pm TECHNIQUE: CT of the abdomen and pelvis was performed with the administration of intravenous contrast. Multiplanar reformatted images are provided for review. Dose modulation, iterative reconstruction, and/or weight based adjustment of the mA/kV was utilized to reduce the radiation dose to as low as reasonably achievable. COMPARISON: CT abdomen and pelvis performed 04/03/2021. HISTORY: ORDERING SYSTEM PROVIDED HISTORY: abdominal pain, recent FNA of pancreatic mass, on Anticoagulation TECHNOLOGIST PROVIDED HISTORY: abdominal pain, recent FNA of pancreatic mass, on Anticoagulation Decision Support Exception->Emergency Medical Condition (MA) Reason for Exam: abdominal pain, recent FNA of pancreatic mass, on Anticoagulation Acuity: Unknown Type of Exam: Unknown FINDINGS: Lower Chest: The lung bases are without consolidation or effusion. Visualized abdominal structures are unremarkable. Organs: The liver and spleen are normal size and overall attenuation. The gallbladder and adrenal glands are unremarkable. There is redemonstration of a peripherally enhancing mass in the pancreatic body that is not significantly changed compared to prior exam.  The kidneys are without obstructive uropathy. The ureters are not dilated. The urinary bladder is unremarkable. GI/Bowel: The stomach is fluid-filled and otherwise unremarkable loops of small bowel are normal in caliber without evidence for obstruction. The colon contains air and fecal residue and is otherwise unremarkable. There is no intraperitoneal free air or free fluid. [place] Pelvis: Uterus appears age-appropriate. Peritoneum/Retroperitoneum: The psoas muscles are symmetric. The abdominal aorta is normal in caliber. The inferior vena cava is unremarkable. There is no retroperitoneal or mesenteric adenopathy. Bones/Soft Tissues:  The Shortness of breath        DISPOSITION:         DISPOSITION:  []  Discharge   []  Transfer -    [x]  Admission -  obs   []  Against Medical Advice   []  Eloped   FOLLOW-UP: No follow-up provider specified.    DISCHARGE MEDICATIONS: New Prescriptions    No medications on file           Mounika Shin DO  Emergency Medicine Resident  [address], [place]  Resident  05/05/21 [address] Eric[address] F[address]  Resident  05/05/21 6428

## 2021-05-06 NOTE — ED NOTES
Bed: 04  Expected date:   Expected time:   Means of arrival:   Comments:  [id number] Lisa Huerta, RN  05/05/21 [id number]

## 2021-05-06 NOTE — CARE COORDINATION
Call placed to [facility] at [facility], she states that the patient left AMA from their facility on 5-4-21. She will need to speak with her  to see if she can be accepted for another precert. Spoke with patient, and she states she does not want to return to [facility], she prefers to return to [facility], as they know her there, and they will make sure to goes to her appointments.     Will inform MD.

## 2021-05-06 NOTE — PROGRESS NOTES
Home Equipment: 4 wheeled walker  ADL Assistance: Independent  Additional Comments: Pt was discharged to SNF after prior admission. Difficult to obtain all social/functional history d/t pt emotional instability this date       Objective   Hearing: Within functional limits         Balance  Sitting Balance: Stand by assistance  Standing Balance: Contact guard assistance  Functional Mobility  Functional - Mobility Device: Rolling Walker  Activity: Other  Assist Level: Contact guard assistance  Functional Mobility Comments: Pt completed functional mobility with use of RW and CGA throughout d/t mild unsteadiness and decreased safety awareness     ADL  Feeding: Independent;Setup(pt feeding self independently upon OT entrance and exit)  Grooming: Stand by assistance;Setup; Increased time to complete  UE Bathing: Contact guard assistance;Setup; Increased time to complete  LE Bathing: Minimal assistance;Setup; Increased time to complete  UE Dressing: Contact guard assistance;Setup; Increased time to complete(pt donned gown to back with CGA; requiring assistance with tying)  LE Dressing: Minimal assistance;Setup; Increased time to complete(pt doffed personal pants and donned hospital pants requiring Min A to initiate threading of BLE. Pt donned underwear with CGA and setup.)  Toileting: Contact guard assistance;Setup; Increased time to complete  Tone RUE  RUE Tone: Normotonic  Tone LUE  LUE Tone: Normotonic  Coordination  Movements Are Fluid And Coordinated: Yes    Bed mobility  Supine to Sit: Contact guard assistance  Sit to Supine: Contact guard assistance  Scooting: Contact guard assistance  Comment: HOB elevated  Transfers  Sit to stand: Contact guard assistance  Stand to sit: Contact guard assistance  Transfer Comments: VCs for safety and hand placement; completed 3X during LB dressing    Cognition  Overall Cognitive Status: Exceptions  Arousal/Alertness: Delayed responses to stimuli  Following Commands:  Follows multistep commands with repitition  Attention Span: Attends with cues to redirect  Safety Judgement: Decreased awareness of need for assistance;Decreased awareness of need for safety  Problem Solving: Assistance required to identify errors made;Assistance required to correct errors made  Insights: Decreased awareness of deficits  Initiation: Requires cues for some  Sequencing: Requires cues for some       Sensation  Overall Sensation Status: WFL        LUE AROM (degrees)  LUE AROM : WFL  Left Hand AROM (degrees)  Left Hand AROM: WFL  RUE AROM (degrees)  RUE AROM : WFL  Right Hand AROM (degrees)  Right Hand AROM: WFL  LUE Strength  LUE Strength Comment: not formally assessed this date d/t pt emotional level; observed to be [facility]  RUE Strength  RUE Strength Comment: not formally assessed this date d/t pt emotional level; observed to be [facility]                   Plan   Plan  Times per week: 3-4 x/wk  Current Treatment Recommendations: Balance Training, Functional Mobility Training, Endurance Training, Cognitive Reorientation, Safety Education & Training, Patient/Caregiver Education & Training, Equipment Evaluation, Education, & procurement, Self-Care / ADL    AM-[facility] Score        [facility] Inpatient Daily Activity Raw Score: 19 (05/06/21 1513)  AM-PAC Inpatient ADL T-Scale Score : 40.22 (05/06/21 1513)  ADL Inpatient CMS 0-100% Score: 42.8 (05/06/21 1513)  ADL Inpatient CMS G-Code Modifier : CK (05/06/21 1513)    Goals  Short term goals  Time Frame for Short term goals: By discharge, pt will:  Short term goal 1: Demo Mod I with use of LRD for functional transfers and functional mobility  Short term goal 2: Demo I with setup for UB ADLs and grooming tasks  Short term goal 3: Demo SBA with setup for LB ADLs and toileting tasks  Short term goal 4: Demo 8+ minutes dynamic standing task to increase independence with ADL/IADLs  Short term goal 5: Demo good safety awareness throughout therapy session with 0 VCs       Therapy Time   Individual Concurrent Group Co-treatment   Time In 7708         Time Out 1353         Minutes 20         Timed Code Treatment Minutes: 8 Minutes       Brigitte Mendiola, OTR/L

## 2021-05-06 NOTE — PROGRESS NOTES
Physical Therapy    DATE: 2021    NAME: Miguel Blandon  MRN: [id number]   : 1955      Patient not seen this date for Physical Therapy due to:    Testing: Pt in CT.       Electronically signed by Latosha Tirado PT on 2021 at 10:19 AM

## 2021-05-06 NOTE — PROGRESS NOTES
Comprehensive Nutrition Assessment    Type and Reason for Visit:  Initial, Positive Nutrition Screen(Weight Loss, Poor Intakes/Appetite, Nausea/Vomiting)    Nutrition Recommendations/Plan: Continue current General diet. Will provide Ensure Enlive oral supplements with meals to boost intakes. Encourage/monitor PO intakes. Will monitor plan of care. Nutrition Assessment:  Pt admitted with c/o right upper abdominal pain, nausea, and vomiting. PMHx includes: CHF, COPD, h/o dysphagia and substance abuse. Pt eating some of lunch tray at visit. Pt reports a poor appetite and weight loss. Unsure of UBW. Per chart review, weight loss of 3% x 1 month noted. Labs reviewed: Na 133 mmol/L. Meds reviewed: Folic Acid, MVI, Thiamine, Vitamin B12. Will provide Ensure supplements with all meals. Malnutrition Assessment:  Malnutrition Status: Moderate malnutrition    Context:  Chronic Illness     Findings of the 6 clinical characteristics of malnutrition:  Energy Intake:  7 - 75% or less estimated energy requirements for 1 month or longer(Predicted)  Weight Loss:  No significant weight loss     Body Fat Loss:  1 - Mild body fat loss Orbital, Triceps   Muscle Mass Loss:  1 - Mild muscle mass loss Temples (temporalis), Clavicles (pectoralis & deltoids)  Fluid Accumulation:  No significant fluid accumulation   Strength:  Not Performed    Estimated Daily Nutrient Needs:  Energy (kcal):  30-35 kcal/kg = 7381-1162 kcals/day; Weight Used for Energy Requirements:  Admission     Protein (g):  1.2-1.5 gm/kg = 50-65 gm pro/day; Weight Used for Protein Requirements:  Ideal        Fluid (ml/day):  6526-2673 mL/day or per MD; Method Used for Fluid Requirements:  1 ml/kcal      Nutrition Related Findings:  Labs/Meds reviewed. C/o abdominal pain, nausea/vomiting. Wounds:  None       Current Nutrition Therapies:    DIET GENERAL;     Anthropometric Measures:  · Height: 4' 11\" (149.9 cm)  · Current Body Weight: 91 lb (41.3 kg)   · Admission Body Weight: 91 lb (41.3 kg)    · Usual Body Weight: 94 lb 8 oz (42.9 kg)(4/3/21 bedscale per chart review)     · Ideal Body Weight: 95 lbs; % Ideal Body Weight 95.8 %   · BMI: 18.4  · BMI Categories: Underweight (BMI less than 18.5)       Nutrition Diagnosis:   · Inadequate oral intake related to (current medical condition, decreased appetite) as evidenced by poor intake prior to admission, nausea, vomiting, intake 0-25%    Nutrition Interventions:   Food and/or Nutrient Delivery:  Continue Current Diet, Start Oral Nutrition Supplement(Provide Ensure oral supplements with all meals.)  Nutrition Education/Counseling:  No recommendation at this time   Coordination of Nutrition Care:  Continue to monitor while inpatient    Goals:  Oral intakes to meet % of estimated nutrition needs.        Nutrition Monitoring and Evaluation:   Food/Nutrient Intake Outcomes:  Food and Nutrient Intake, Supplement Intake  Physical Signs/Symptoms Outcomes:  Biochemical Data, GI Status, Nausea or Vomiting, Hemodynamic Status, Nutrition Focused Physical Findings, Skin, Weight     Electronically signed by Ambar Phan RD, LD on 5/6/21 at 3:18 PM EDT    Contact: [phone]

## 2021-05-06 NOTE — DISCHARGE INSTR - COC
Continuity of Care Form    Patient Name: Mai Jacobs   :  1955  MRN:  [id number]    Admit date:  2021  Discharge date:  ***    Code Status Order: Full Code   Advance Directives:   Advance Care Flowsheet Documentation       Date/Time Healthcare Directive Type of Healthcare Directive Copy in [address] Agent's Name Healthcare Agent's Phone Number    21 0116  No, patient does not have an advance directive for healthcare treatment -- -- -- -- --            Admitting Physician:  Archie Lane MD  PCP: No primary care provider on file.     Discharging Nurse: [facility] Unit/Room#: 5412/5802-32  Discharging Unit Phone Number: ***    Emergency Contact:   Extended Emergency Contact Information  Primary Emergency Contact: Romero Bundy  Home Phone: [phone]  Mobile Phone: [phone]  Relation: Child    Past Surgical History:  Past Surgical History:   Procedure Laterality Date    APPENDECTOMY  2017     SECTION      GASTRIC BYPASS SURGERY      HIP SURGERY Right 2019    HIP TOTAL ARTHROPLASTY ANTERIOR APPROACH - [place]  C-ARM, (Right )    LAPAROSCOPIC APPENDECTOMY N/A 3/27/2017    APPENDECTOMY LAPAROSCOPIC performed by Mariusz Braden MD at [address] [place] [address]    [email or web address]    MEDICATION INJECTION Left 3/25/2021    STEROID INJECTION LEFT HIP  WITH FLUORO performed by Lyndon Bustillo MD at [facility] N/A 11/10/2018    COLONOSCOPY WITH BIOPSY performed by Raheel Holguin MD at [facility]    NM EGD TRANSORAL BIOPSY SINGLE/MULTIPLE N/A 2018    EGD ESOPHAGOGASTRODUODENOSCOPY performed by Ingrid Costello MD at [address] [place] [address] Right 2019    HIP TOTAL ARTHROPLASTY ANTERIOR APPROACH - [place],  C-ARM, performed by Cheryl Ann DO at [facility]      oesophagitis, candidiasis    UPPER GASTROINTESTINAL ENDOSCOPY  2018 UPPER GASTROINTESTINAL ENDOSCOPY N/A 11/5/2018    EGD CONTROL HEMORRHAGE performed by Cody Burroughs MD at [address] 4/5/2021    EGD W/EUS FNA performed by Amy Dc MD at [facility]       Immunization History: There is no immunization history for the selected administration types on file for this patient.     Active Problems:  Patient Active Problem List   Diagnosis Code    Essential hypertension I10    Hepatitis C B19.20    History of substance abuse ([address] F19.11    Chronic heart failure with reduced ejection fraction and diastolic dysfunction (HCC) H71.64    Alcoholic cirrhosis of liver without ascites (HCC) K70.30    PUD (peptic ulcer disease) K27.9    Anemia D64.9    Duodenal ulcer with hemorrhage K26.4    Psychosis, intermittent requring antipsychotic meds F29    Opiate withdrawal ([address] F11.23    Pneumonia J18.9    Pancreas head lesion S36.200A    Polysubstance abuse ([address] F19.10    Right flank pain R10.9    Avascular necrosis of bone of right hip ([address] M87.051    Closed fracture of head of right femur ([address] S72.051A    COPD without exacerbation ([address] J44.9    Hypokalemia E87.6    History of hepatitis C Z86.19    Avascular necrosis (HCC) M87.00    History of total right hip replacement Z96.641    Protein calorie malnutrition (HCC) E46    Chronic diastolic heart failure (HCC) I50.32    Right sided weakness R53.1    Chronic daily headache R51.9    Memory changes R41.3    Generalized weakness R53.1    Stroke with cerebral ischemia (HCC) I63.9    SOB (shortness of breath) R06.02    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.052    Pulmonary embolism on right (HCC) I26.99    Multiple subsegmental pulmonary emboli without acute cor pulmonale (HCC) I26.94    Acute on chronic blood loss anemia D62    Severe comorbid illness R69    Primary osteoarthritis of left hip M16.12    Chronic anticoagulation Z79.01    Chronic idiopathic constipation K59.04    DDD (degenerative disc Delivery   [address] MED Delivery:111554838}    Wound Care Documentation and Therapy:  Puncture Hip Anterior; Left (Active)   Number of days:         Elimination:  Continence: Bowel: {YES / EV:70975}  Bladder: {YES / VS:37132}  Urinary Catheter: {Urinary Catheter:845181384}   Colostomy/Ileostomy/Ileal Conduit: {YES / IQ:76415}       Date of Last BM: ***    Intake/Output Summary (Last 24 hours) at 2021 1503  Last data filed at 2021 2100  Gross per 24 hour   Intake 1000 ml   Output    Net 1000 ml     I/O last 3 completed shifts:   In: 1000 [I.V.:1000]  Out: -     Safety Concerns:     [address] Safety Concerns:057230412}    Impairments/Disabilities:      [address] Impairments/Disabilities:704918511}    Nutrition Therapy:  Current Nutrition Therapy:   [address] Diet List:443095080}    Routes of Feeding: {CHP DME Other Feedings:355722182}  Liquids: {Slp liquid thickness:16650}  Daily Fluid Restriction: {CHP DME Yes amt example:013960934}  Last Modified Barium Swallow with Video (Video Swallowing Test): {Done Not Done LINU:146804625}    Treatments at the Time of Hospital Discharge:   Respiratory Treatments: ***  Oxygen Therapy:  {Therapy; copd oxygen:84242}  Ventilator:    {MH CC Vent IROY:051260865}    Rehab Therapies: {THERAPEUTIC INTERVENTION:2814653798}  Weight Bearing Status/Restrictions: [address]  Weight Bearin}  Other Medical Equipment (for information only, NOT a DME order):  {EQUIPMENT:229609478}  Other Treatments: ***    Patient's personal belongings (please select all that are sent with patient):  {P DME Belongings:246808801}    RN SIGNATURE:  {Esignature:853514343}    CASE MANAGEMENT/SOCIAL WORK SECTION    Inpatient Status Date: ***    Readmission Risk Assessment Score:  Readmission Risk              Risk of Unplanned Readmission:        0           Discharging to Facility/ [facility]  [address] [place]  [address]       Phone: [phone]       Fax: [phone]            / signature: Electronically signed by Johanny Rashid RN on 5/10/21 at 5:59 PM EDT    PHYSICIAN SECTION    Prognosis: Good    Condition at Discharge: Stable    Rehab Potential (if transferring to Rehab): Good    Recommended Labs or Other Treatments After Discharge: Follow up with surgical oncology    Physician Certification: I certify the above information and transfer of Emi Daniels  is necessary for the continuing treatment of the diagnosis listed and that she requires [place] for greater 30 days.      Update Admission H&P: No change in H&P    PHYSICIAN SIGNATURE:  Electronically signed by Queta Schroeder MD on 5/11/21 at 11:46 AM EDT

## 2021-05-06 NOTE — PROGRESS NOTES
Physical Therapy    Facility/Department: [address] MED SURG  Initial Assessment    NAME: Emi Daniels  : 1955  MRN: [id number]    Date of Service: 2021  Chief Complaint   Patient presents with    Abdominal Pain       Discharge Recommendations:    Further therapy recommended at discharge. PT Equipment Recommendations  Equipment Needed: Yes  Mobility Devices: Randy Knee: Rolling  Other: Pt reports owning a RW, but is \"unsure of where it ended up\". Assessment   Body structures, Functions, Activity limitations: Decreased functional mobility ; Decreased posture;Decreased endurance;Decreased strength; Increased pain;Decreased balance  Assessment: Pt ambulated 110ft w/ RW CGA, pt demonstrating significant shuffling gait throughout mobility. Pt would benefit from continued skilled physical therapy to address endurance deficits and gait training to return pt to prior level of indepdence. Prognosis: Fair  Decision Making: Medium Complexity  PT Education: PT Role;Plan of Care;Goals; General Safety  REQUIRES PT FOLLOW UP: Yes  Activity Tolerance  Activity Tolerance: Patient Tolerated treatment well       Patient Diagnosis(es): The primary encounter diagnosis was Right upper quadrant abdominal pain. A diagnosis of Shortness of breath was also pertinent to this visit.      has a past medical history of Appendicitis, Cerebral artery occlusion with cerebral infarction ([address] CHF (congestive heart failure) ([address] Chronic respiratory failure with hypoxia and hypercapnia (HCC), Chronic rhinitis, Cigarette smoker, COPD (chronic obstructive pulmonary disease) ([address] Depression, Dysphagia, Essential hypertension, GERD (gastroesophageal reflux disease), Heart failure, diastolic, with acute decompensation ([address] Hepatitis C, chronic ([address] History of smoking at least 1 pack per day for at least 30 years, Hyperlipidemia, Hypertension, Migraine, Moderate COPD (chronic obstructive pulmonary disease) ([address] Multiple Assistance: Independent  Additional Comments: Pt was discharged to SNF after prior admission. Difficult to obtain all social/functional history d/t pt emotional instability this date  Cognition   Cognition  Overall Cognitive Status: Exceptions  Arousal/Alertness: Delayed responses to stimuli  Following Commands: Follows multistep commands with repitition  Attention Span: Attends with cues to redirect  Safety Judgement: Decreased awareness of need for assistance;Decreased awareness of need for safety  Problem Solving: Assistance required to identify errors made;Assistance required to correct errors made  Insights: Decreased awareness of deficits  Initiation: Requires cues for some  Sequencing: Requires cues for some    Objective  Joint Mobility  Spine: WFL  ROM RLE: WFL  ROM LLE: WFL  ROM RUE: WFL  ROM LUE: WFL  Strength RLE  Strength RLE: WFL  Strength LLE  Strength LLE: WFL  Strength RUE  Strength RUE: WFL  Strength LUE  Strength LUE: WFL     Sensation  Overall Sensation Status: WFL  Bed mobility  Supine to Sit: Contact guard assistance  Sit to Supine: Contact guard assistance  Scooting: Contact guard assistance  Transfers  Sit to Stand: Contact guard assistance  Stand to sit: Contact guard assistance  Ambulation  Ambulation?: Yes  Ambulation 1  Surface: level tile  Device: Rolling Walker  Assistance: Contact guard assistance  Gait Deviations: Decreased step length;Shuffles  Distance: 110ft  Comments: No LOB noted throughout session. Pt ambulates with significant shuffling of gait despite max verbal cueing to increase step length bilaterally.   Stairs/Curb  Stairs?: No     Balance  Posture: Fair  Sitting - Static: Good  Sitting - Dynamic: Good  Standing - Static: Good;-  Standing - Dynamic: Fair;+  Comments: standing balance assessed w/ RW; pt able to sit EOB SBA        Plan   Plan  Times per week: 4-5x/week  Current Treatment Recommendations: Strengthening, Transfer Training, Endurance Training, Patient/Caregiver Education & Training, ROM, Equipment Evaluation, Education, & procurement, Balance Training, Gait Training, Home Exercise Program, Functional Mobility Training, Stair training, Safety Education & Training  Safety Devices  Type of devices: Left in bed, Call light within reach, Gait belt, Nurse notified  Restraints  Initially in place: No  AM-PAC Score     AM-PAC Inpatient Mobility without Stair Climbing Raw Score : 16 (05/06/21 1544)  AM-PAC Inpatient without Stair Climbing T-Scale Score : 45.54 (05/06/21 1544)  Mobility Inpatient CMS 0-100% Score: 40.64 (05/06/21 1544)  Mobility Inpatient without Stair CMS G-Code Modifier : CK (05/06/21 1544)       Goals  Short term goals  Time Frame for Short term goals: 14  Short term goal 1: Pt to perform bed mobility independently  Short term goal 2: Demonstrate functional transfers independently  Short term goal 3: Ambulate 300ft w/ least restrictive AD with supervision  Short term goal 4: Ascend/descend 2 stairs with no rail to simulate community ambulation  Patient Goals   Patient goals : Did not state       Therapy Time   Individual Concurrent Group Co-treatment   Time In 1333         Time Out 1353         Minutes 20         Timed Code Treatment Minutes: 8 Minutes       Moi Flood, PT

## 2021-05-06 NOTE — H&P
[facility]  CDU / OBSERVATION eNCOUnter  Resident Note     Pt Name: Ama Ko  MRN: [id number]  [place] 1955  Date of evaluation: 5/6/21  Patient's PCP is : No primary care provider on file. CHIEF COMPLAINT       Chief Complaint   Patient presents with    Abdominal Pain         HISTORY OF PRESENT ILLNESS    Emi Daniles is a 74 y.o. female who presents right upper quadrant abdominal pain that radiates to the back with associated nausea and mild bit of vomiting. Patient was admitted to observation overnight. Patient states that she is feeling much better this morning but still having some mild epigastric area tenderness and pain. Patient also concerned about the pain state that she needs to get surgery but unsure clear on what she needs the surgery on or with what specialty service. Patient more concerned that she should not be able to leave until having a place to go as she is homeless. From chart checking patient does have a large known pancreatic mass concerning for pancreatic cancer. Patient was supposed to follow-up outpatient for surgery but has not. Location/Symptom: Right upper quadrant  Timing/Onset: Months  Provocation: Provocation  Quality: Continual ache  Radiation:  To the back  Severity: 5/10  Timing/Duration: Ongoing  Modifying Factors: Analgesic medication    REVIEW OF SYSTEMS       General ROS - No fevers, No malaise   Ophthalmic ROS - No discharge, No changes in vision  ENT ROS -  No sore throat, No rhinorrhea,   Respiratory ROS - no shortness of breath, no cough, no  wheezing  Cardiovascular ROS - No chest pain, no dyspnea on exertion  Gastrointestinal ROS -positive for abdominal pain no nausea or vomiting, no change in bowel habits, no black or bloody stools  Genito-Urinary ROS - No dysuria, trouble voiding, or hematuria  Musculoskeletal ROS - No myalgias, No arthalgias  Neurological ROS - No headache, no dizziness/lightheadedness, No focal weakness, no loss of sensation  Dermatological ROS - No lesions, No rash     (PQRS) Advance directives on face sheet per hospital policy. No change unless specifically mentioned in chart    [address]    has a past medical history of Appendicitis, Cerebral artery occlusion with cerebral infarction ([address] CHF (congestive heart failure) ([address] Chronic respiratory failure with hypoxia and hypercapnia (HCC), Chronic rhinitis, Cigarette smoker, COPD (chronic obstructive pulmonary disease) ([address] Depression, Dysphagia, Essential hypertension, GERD (gastroesophageal reflux disease), Heart failure, diastolic, with acute decompensation ([address] Hepatitis C, chronic ([address] History of smoking at least 1 pack per day for at least 30 years, Hyperlipidemia, Hypertension, Migraine, Moderate COPD (chronic obstructive pulmonary disease) ([address] Multiple transfusions, Neurogenic dysphagia, Osteoarthritis, Pneumonia, Scoliosis, and Substance abuse ([address] I have reviewed the past medical history with the patient and it is pertinent to this complaint. SURGICAL HISTORY      has a past surgical history that includes  section; LEEP (); Upper gastrointestinal endoscopy (); Gastric bypass surgery; Appendectomy (2017); laparoscopic appendectomy (N/A, 3/27/2017); Upper gastrointestinal endoscopy (2018); Upper gastrointestinal endoscopy (N/A, 2018); pr colonoscopy w/biopsy single/multiple (N/A, 11/10/2018); pr egd transoral biopsy single/multiple (N/A, 2018); hip surgery (Right, 2019); Total hip arthroplasty (Right, 2019); Medication Injection (Left, 3/25/2021); and Upper gastrointestinal endoscopy (N/A, 2021). I have reviewed and agree with Surgical History entered and it is pertinent to this complaint.      CURRENT MEDICATIONS     therapeutic multivitamin-minerals 1 tablet, Daily  folic acid (FOLVITE) tablet 1 mg, Daily  thiamine mononitrate tablet 100 mg, Daily  vitamin B-12 (CYANOCOBALAMIN) tablet 250 mcg, Daily  ipratropium-albuterol (DUONEB) nebulizer solution 3 mL, Q4H PRN  butalbital-acetaminophen-caffeine (FIORICET, ESGIC) per tablet 1 tablet, Q12H  famotidine (PEPCID) tablet 20 mg, BID  budesonide-formoterol (SYMBICORT) 80-4.5 MCG/ACT inhaler 2 puff, BID  mirtazapine (REMERON) tablet 30 mg, Nightly  lisinopril (PRINIVIL;ZESTRIL) tablet 10 mg, Daily  gabapentin (NEURONTIN) capsule 300 mg, TID  ferrous sulfate (FE TABS 325) EC tablet 325 mg, Daily  clopidogrel (PLAVIX) tablet 75 mg, Daily  carvedilol (COREG) tablet 6.25 mg, BID WC  atorvastatin (LIPITOR) tablet 40 mg, Nightly  amitriptyline (ELAVIL) tablet 10 mg, Nightly  albuterol (PROVENTIL) nebulizer solution 2.5 mg, Q4H PRN  apixaban (ELIQUIS) tablet 5 mg, BID  sodium chloride flush 0.9 % injection 5-40 mL, 2 times per day  sodium chloride flush 0.9 % injection 5-40 mL, PRN  0.9 % sodium chloride infusion, PRN  acetaminophen (TYLENOL) tablet 650 mg, Q4H PRN  oxyCODONE (ROXICODONE) immediate release tablet 5 mg, Q4H PRN  ondansetron (ZOFRAN-ODT) disintegrating tablet 4 mg, Q8H PRN  fentaNYL (SUBLIMAZE) injection 50 mcg, Q4H PRN  iopamidol (ISOVUE-370) 76 % injection 75 mL, ONCE PRN        All medication charted and reviewed. ALLERGIES     is allergic to aspirin and keflet [cephalexin]. FAMILY HISTORY     She indicated that her mother is . She indicated that her father is . family history includes Breast Cancer in her mother; Heart Disease in her father. The patient denies any pertinent family history. I have reviewed and agree with the family history entered. I have reviewed the Family History and it is not significant to the case    SOCIAL HISTORY      reports that she has been smoking cigarettes. She started smoking about 51 years ago. She has a 20.00 pack-year smoking history. She has never used smokeless tobacco. She reports that she does not drink alcohol or use drugs.   I have reviewed and agree with all Social.  There are mild concerns for substance abuse/use. PHYSICAL EXAM     INITIAL VITALS:  height is 4' 11\" (1.499 m) and weight is 91 lb (41.3 kg). Her oral temperature is 97.2 °F (36.2 °C). Her blood pressure is 121/73 and her pulse is 86. Her respiration is 16 and oxygen saturation is 96%. CONSTITUTIONAL: AOx4, no apparent distress, appears older than stated age   HEAD: normocephalic, atraumatic   EYES: PERRLA, EOMI    ENT: moist mucous membranes, uvula midline   NECK: supple, symmetric   BACK: symmetric   LUNGS: clear to auscultation bilaterally   CARDIOVASCULAR: regular rate and rhythm, no murmurs, rubs or gallops   ABDOMEN: soft, non-tender, non-distended with normal active bowel sounds   NEUROLOGIC:  MAEx4, no focal sensory or motor deficits   MUSCULOSKELETAL: no clubbing, cyanosis or edema   SKIN: no rash or wounds       DIFFERENTIAL DIAGNOSIS/MDM:   Pancreatic cancer, right upper quadrant abdominal pain      DIAGNOSTIC RESULTS   Xr Chest Portable    Result Date: 5/5/2021  EXAMINATION: ONE XRAY VIEW OF THE CHEST 5/5/2021 3:27 pm COMPARISON: 04/25/2021 HISTORY: ORDERING SYSTEM PROVIDED HISTORY: SOA TECHNOLOGIST PROVIDED HISTORY: SOA Reason for Exam: shortness of breath   upright port FINDINGS: Lungs are hyperinflated. The cardiac size is normal.  Mild chronic interstitial lung changes. No acute infiltrates or pleural effusions are seen. Pulmonary vascularity appears normal. There is mild ectasia of the thoracic aorta. Stable scoliosis. No acute bony abnormalities. The hilar structures are normal.  No other significant changes     No acute cardiopulmonary disease       LABS:  I have reviewed and interpreted all available lab results.   Labs Reviewed   CBC WITH AUTO DIFFERENTIAL - Abnormal; Notable for the following components:       Result Value    .7 (*)     RDW 15.1 (*)     Platelets 364 (*)     Seg Neutrophils 74 (*)     Lymphocytes 18 (*)     Immature Granulocytes 1 (*)     All

## 2021-05-07 PROCEDURE — 6370000000 HC RX 637 (ALT 250 FOR IP): Performed by: EMERGENCY MEDICINE

## 2021-05-07 PROCEDURE — 2580000003 HC RX 258: Performed by: EMERGENCY MEDICINE

## 2021-05-07 PROCEDURE — 6370000000 HC RX 637 (ALT 250 FOR IP): Performed by: STUDENT IN AN ORGANIZED HEALTH CARE EDUCATION/TRAINING PROGRAM

## 2021-05-07 PROCEDURE — 97535 SELF CARE MNGMENT TRAINING: CPT | Performed by: OCCUPATIONAL THERAPIST

## 2021-05-07 PROCEDURE — 1200000000 HC SEMI PRIVATE

## 2021-05-07 PROCEDURE — 97116 GAIT TRAINING THERAPY: CPT

## 2021-05-07 PROCEDURE — 94640 AIRWAY INHALATION TREATMENT: CPT

## 2021-05-07 RX ORDER — OXYCODONE HYDROCHLORIDE 5 MG/1
5 TABLET ORAL EVERY 6 HOURS PRN
Qty: 5 TABLET | Refills: 0 | Status: SHIPPED | OUTPATIENT
Start: 2021-05-07 | End: 2021-05-10

## 2021-05-07 RX ORDER — ONDANSETRON 4 MG/1
4 TABLET, ORALLY DISINTEGRATING ORAL EVERY 8 HOURS PRN
Qty: 10 TABLET | Refills: 0 | Status: SHIPPED | OUTPATIENT
Start: 2021-05-07 | End: 2021-08-18

## 2021-05-07 RX ORDER — ACETAMINOPHEN 500 MG
1000 TABLET ORAL EVERY 6 HOURS PRN
Qty: 40 TABLET | Refills: 0 | Status: SHIPPED | OUTPATIENT
Start: 2021-05-07

## 2021-05-07 RX ORDER — POLYETHYLENE GLYCOL 3350 17 G/17G
17 POWDER, FOR SOLUTION ORAL DAILY PRN
Qty: 527 G | Refills: 0 | Status: SHIPPED | OUTPATIENT
Start: 2021-05-07 | End: 2021-06-06

## 2021-05-07 RX ADMIN — GABAPENTIN 300 MG: 300 CAPSULE ORAL at 15:43

## 2021-05-07 RX ADMIN — Medication 250 MCG: at 09:23

## 2021-05-07 RX ADMIN — BUDESONIDE AND FORMOTEROL FUMARATE DIHYDRATE 2 PUFF: 80; 4.5 AEROSOL RESPIRATORY (INHALATION) at 09:07

## 2021-05-07 RX ADMIN — CARVEDILOL 6.25 MG: 6.25 TABLET, FILM COATED ORAL at 09:26

## 2021-05-07 RX ADMIN — BUTALBITAL, ACETAMINOPHEN AND CAFFEINE 1 TABLET: 50; 325; 40 TABLET ORAL at 09:27

## 2021-05-07 RX ADMIN — SODIUM CHLORIDE, PRESERVATIVE FREE 10 ML: 5 INJECTION INTRAVENOUS at 09:28

## 2021-05-07 RX ADMIN — GABAPENTIN 300 MG: 300 CAPSULE ORAL at 21:04

## 2021-05-07 RX ADMIN — BUTALBITAL, ACETAMINOPHEN AND CAFFEINE 1 TABLET: 50; 325; 40 TABLET ORAL at 23:02

## 2021-05-07 RX ADMIN — FERROUS SULFATE TAB EC 325 MG (65 MG FE EQUIVALENT) 325 MG: 325 (65 FE) TABLET DELAYED RESPONSE at 09:25

## 2021-05-07 RX ADMIN — OXYCODONE HYDROCHLORIDE 5 MG: 5 TABLET ORAL at 21:03

## 2021-05-07 RX ADMIN — OXYCODONE HYDROCHLORIDE 5 MG: 5 TABLET ORAL at 16:31

## 2021-05-07 RX ADMIN — FAMOTIDINE 20 MG: 20 TABLET, FILM COATED ORAL at 08:02

## 2021-05-07 RX ADMIN — AMITRIPTYLINE HYDROCHLORIDE 10 MG: 10 TABLET, FILM COATED ORAL at 21:03

## 2021-05-07 RX ADMIN — OXYCODONE HYDROCHLORIDE 5 MG: 5 TABLET ORAL at 12:29

## 2021-05-07 RX ADMIN — APIXABAN 5 MG: 5 TABLET, FILM COATED ORAL at 09:27

## 2021-05-07 RX ADMIN — BUDESONIDE AND FORMOTEROL FUMARATE DIHYDRATE 2 PUFF: 80; 4.5 AEROSOL RESPIRATORY (INHALATION) at 20:04

## 2021-05-07 RX ADMIN — OXYCODONE HYDROCHLORIDE 5 MG: 5 TABLET ORAL at 08:02

## 2021-05-07 RX ADMIN — CARVEDILOL 6.25 MG: 6.25 TABLET, FILM COATED ORAL at 17:32

## 2021-05-07 RX ADMIN — CLOPIDOGREL 75 MG: 75 TABLET, FILM COATED ORAL at 09:26

## 2021-05-07 RX ADMIN — Medication 1 TABLET: at 09:25

## 2021-05-07 RX ADMIN — GABAPENTIN 300 MG: 300 CAPSULE ORAL at 09:25

## 2021-05-07 RX ADMIN — Medication 100 MG: at 09:24

## 2021-05-07 RX ADMIN — FOLIC ACID 1 MG: 1 TABLET ORAL at 09:25

## 2021-05-07 RX ADMIN — LISINOPRIL 10 MG: 10 TABLET ORAL at 09:24

## 2021-05-07 RX ADMIN — ONDANSETRON 4 MG: 4 TABLET, ORALLY DISINTEGRATING ORAL at 08:03

## 2021-05-07 ASSESSMENT — PAIN SCALES - GENERAL
PAINLEVEL_OUTOF10: 8
PAINLEVEL_OUTOF10: 7
PAINLEVEL_OUTOF10: 8

## 2021-05-07 ASSESSMENT — PAIN DESCRIPTION - PAIN TYPE
TYPE: CHRONIC PAIN;ACUTE PAIN
TYPE: CHRONIC PAIN;ACUTE PAIN

## 2021-05-07 NOTE — PROGRESS NOTES
Comprehensive Nutrition Assessment    Type and Reason for Visit:  Reassess    Nutrition Recommendations/Plan: Continue current General diet with Ensure Enlive oral supplements. Encourage/monitor PO intakes as tolerated. Will monitor labs and plan of care. Nutrition Assessment:  Pt continues to c/o abdominal pain. cramping when eating. Taking some of meals and Ensure supplements. Labs reviewed. Meds reviewed: Folic Acid, MVI, Thiamine, Vitamin B12. Malnutrition Assessment:  Malnutrition Status: Moderate malnutrition    Context:  Chronic Illness     Findings of the 6 clinical characteristics of malnutrition:  Energy Intake:  7 - 75% or less estimated energy requirements for 1 month or longer(Predicted)  Weight Loss:  No significant weight loss     Body Fat Loss:  1 - Mild body fat loss Orbital, Triceps   Muscle Mass Loss:  1 - Mild muscle mass loss Temples (temporalis), Clavicles (pectoralis & deltoids)  Fluid Accumulation:  No significant fluid accumulation   Strength:  Not Performed    Estimated Daily Nutrient Needs:  Energy (kcal):  30-35 kcal/kg = 5700-1309 kcals/day; Weight Used for Energy Requirements:  Admission     Protein (g):  1.2-1.5 gm/kg = 50-65 gm pro/day; Weight Used for Protein Requirements:  Ideal        Fluid (ml/day):  1716-8329 mL/day or per MD; Method Used for Fluid Requirements:  1 ml/kcal      Nutrition Related Findings:  Labs/Meds reviewed. C/o abdominal pain, nausea/vomiting.       Wounds:  None       Current Nutrition Therapies:    DIET GENERAL;  Dietary Nutrition Supplements: Standard High Calorie Oral Supplement    Anthropometric Measures:  · Height: 4' 11\" (149.9 cm)  · Current Body Weight: 91 lb (41.3 kg)   · Admission Body Weight: 91 lb (41.3 kg)    · Usual Body Weight: 94 lb 8 oz (42.9 kg)(4/3/21 bedscale per chart review)     · Ideal Body Weight: 95 lbs; % Ideal Body Weight 95.8 %   · BMI: 18.4  · BMI Categories: Underweight (BMI less than 18.5)       Nutrition Diagnosis:   · Inadequate oral intake related to (current medical condition, decreased appetite) as evidenced by poor intake prior to admission, nausea, vomiting, intake 0-25%, intake 26-50%    Nutrition Interventions:   Food and/or Nutrient Delivery:  Continue Current Diet, Continue Oral Nutrition Supplement  Nutrition Education/Counseling:  No recommendation at this time   Coordination of Nutrition Care:  Continue to monitor while inpatient    Goals:  Oral intakes to meet % of estimated nutrition needs.        Nutrition Monitoring and Evaluation:   Food/Nutrient Intake Outcomes:  Food and Nutrient Intake, Supplement Intake  Physical Signs/Symptoms Outcomes:  Biochemical Data, GI Status, Nausea or Vomiting, Hemodynamic Status, Nutrition Focused Physical Findings, Skin, Weight     Electronically signed by Kelly Robins RD, LD on 5/7/21 at 3:31 PM EDT    Contact: [phone]

## 2021-05-07 NOTE — CARE COORDINATION
Left message again for Zack Perez ([facility] ) [phone] x [phone]. Asking if he available to take patient to her upcoming appointments.

## 2021-05-07 NOTE — PROGRESS NOTES
Occupational Therapy  Facility/Department: [address] MED SURG  Daily Treatment Note  NAME: Emi Daniels  : 1955  MRN: [id number]    Date of Service: 2021    Discharge Recommendations:  Patient would benefit from continued therapy after discharge     Assessment   Performance deficits / Impairments: Decreased functional mobility ; Decreased ADL status; Decreased safe awareness;Decreased cognition;Decreased endurance;Decreased high-level IADLs;Decreased balance  Assessment: Pt agreeable to OT. Pt refusing ADLs, agreeable to get up and into chair. Pt reported pain with mobility and required physical assist to maintain safety during mobility. Prognosis: Good  OT Education: OT Role;Plan of Care  Patient Education: Pt verbalized understanding. REQUIRES OT FOLLOW UP: Yes  Activity Tolerance  Activity Tolerance: Patient Tolerated treatment well  Safety Devices  Safety Devices in place: Yes  Type of devices: Left in chair;Call light within reach;Gait belt;Patient at risk for falls  Restraints  Initially in place: No       Patient Diagnosis(es): The primary encounter diagnosis was Right upper quadrant abdominal pain. A diagnosis of Shortness of breath was also pertinent to this visit.       has a past medical history of Appendicitis, Cerebral artery occlusion with cerebral infarction ([address] CHF (congestive heart failure) ([address] Chronic respiratory failure with hypoxia and hypercapnia (HCC), Chronic rhinitis, Cigarette smoker, COPD (chronic obstructive pulmonary disease) ([address] Depression, Dysphagia, Essential hypertension, GERD (gastroesophageal reflux disease), Heart failure, diastolic, with acute decompensation ([address] Hepatitis C, chronic ([address] History of smoking at least 1 pack per day for at least 30 years, Hyperlipidemia, Hypertension, Migraine, Moderate COPD (chronic obstructive pulmonary disease) ([address] Multiple transfusions, Neurogenic dysphagia, Osteoarthritis, Pneumonia, Scoliosis, and Substance abuse ([facility] has a past surgical history that includes  section; LEEP (); Upper gastrointestinal endoscopy (); Gastric bypass surgery; Appendectomy (2017); laparoscopic appendectomy (N/A, 3/27/2017); Upper gastrointestinal endoscopy (2018); Upper gastrointestinal endoscopy (N/A, 2018); pr colonoscopy w/biopsy single/multiple (N/A, 11/10/2018); pr egd transoral biopsy single/multiple (N/A, 2018); hip surgery (Right, 2019); Total hip arthroplasty (Right, 2019); Medication Injection (Left, 3/25/2021); and Upper gastrointestinal endoscopy (N/A, 2021). Restrictions  Restrictions/Precautions  Restrictions/Precautions: Fall Risk  Required Braces or Orthoses?: No  Position Activity Restriction  Other position/activity restrictions: up with assist     Subjective   General  Patient assessed for rehabilitation services?: Yes  Family / Caregiver Present: No  Pain Assessment  Pain Assessment: 0-10  Pain Level: 7  Pain Type: Chronic pain;Acute pain  Pain Location: Abdomen  Non-Pharmaceutical Pain Intervention(s): Ambulation/Increased Activity;Repositioned  Response to Pain Intervention: Patient Satisfied  Vital Signs  Patient Currently in Pain: Yes      Objective    ADL  Additional Comments: Pt refusing ADLs this date, reporting already having completed this AM.        Balance  Sitting Balance: Stand by assistance  Standing Balance: Contact guard assistance  Standing Balance  Time: ~2 minutes  Activity: functional mobility  Functional Mobility  Functional - Mobility Device: Standard Walker  Activity: Other(from bed around to chair)  Assist Level: Contact guard assistance  Functional Mobility Comments: Verbal cueing provided to ensure safety with use of walker.   Bed mobility  Supine to Sit: Stand by assistance  Transfers  Sit to stand: Stand by assistance  Stand to sit: Stand by assistance     Plan   Plan  Times per week: 3-4 x/wk  Current Treatment Recommendations: Balance Training, Functional Mobility Training, Endurance Training, Cognitive Reorientation, Safety Education & Training, Patient/Caregiver Education & Training, Equipment Evaluation, Education, & procurement, Self-Care / ADL    Goals  Short term goals  Time Frame for Short term goals: By discharge, pt will:  Short term goal 1: Demo Mod I with use of LRD for functional transfers and functional mobility  Short term goal 2: Demo I with setup for UB ADLs and grooming tasks  Short term goal 3: Demo SBA with setup for LB ADLs and toileting tasks  Short term goal 4: Demo 8+ minutes dynamic standing task to increase independence with ADL/IADLs  Short term goal 5: Demo good safety awareness throughout therapy session with 0 VCs       Therapy Time   Individual Concurrent Group Co-treatment   Time In 0953         Time Out 1008         Minutes 15         Timed Code Treatment Minutes: [address] [facility], OTR/L

## 2021-05-07 NOTE — PLAN OF CARE
Problem: Falls - Risk of:  Goal: Will remain free from falls  Description: Will remain free from falls  Outcome: Ongoing  Goal: Absence of physical injury  Description: Absence of physical injury  Outcome: Ongoing     Problem: Pain:  Goal: Pain level will decrease  Description: Pain level will decrease  Outcome: Ongoing  Goal: Control of acute pain  Description: Control of acute pain  Outcome: Ongoing  Goal: Control of chronic pain  Description: Control of chronic pain  Outcome: Ongoing     Problem: Nutrition  Goal: Optimal nutrition therapy  5/7/2021 0316 by Lauren Nagel RN  Outcome: Ongoing  5/6/2021 1519 by Andrea Schwab RD, LD  Outcome: Ongoing  Note: Nutrition Problem #1: Inadequate oral intake  Intervention: Food and/or Nutrient Delivery: Continue Current Diet, Start Oral Nutrition Supplement(Provide Ensure oral supplements with all meals.)  Nutritional Goals: Oral intakes to meet % of estimated nutrition needs.      Problem: Musculor/Skeletal Functional Status  Goal: Highest potential functional level  Outcome: Ongoing  Goal: Absence of falls  Outcome: Ongoing

## 2021-05-07 NOTE — PROGRESS NOTES
[address]  CDU / OBSERVATION ENCOUNTER  ATTENDING NOTE       I performed a history and physical examination of the patient and discussed management with the resident or midlevel provider. I reviewed the resident or midlevel provider's note and agree with the documented findings and plan of care. Any areas of disagreement are noted on the chart. I was personally present for the key portions of any procedures. I have documented in the chart those procedures where I was not present during the key portions. I have reviewed the nurses notes. I agree with the chief complaint, past medical history, past surgical history, allergies, medications, social and family history as documented unless otherwise noted below. The Family history, social history, and ROS are effectively unchanged since admission unless noted elsewhere in the chart. Patient with difficulty with follow-up. Patient has had problems from both a situation with her social circumstances and to noncompliance. Patient has difficulty secondary to transportation issues but also has left AMA from other situations that were set up for her advantage. Patient has need for primary care physician. Patient has appointment made with both pain management and oncologist but is reliant on others for transportation. We will make an effort to get patient to appropriate living situation along with PCP appointment today. Patient's medical condition is in need of follow-up but not ongoing hospitalization at this point. PT Assessment:Assessment: Pt ambulated 110ft w/ RW CGA, pt demonstrating significant shuffling gait throughout mobility. Pt would benefit from continued skilled physical therapy to address endurance deficits and gait training to return pt to prior level of indepdence    Discussed also with OT. Significant concerns for safety at homeless shelter. Pt is amenable to SNF placement.     Taina Davenport MD  Attending Emergency  Physician

## 2021-05-07 NOTE — CARE COORDINATION
Received call from [facility], not able to accept the patient as she is not self sufficient, and she is a safety risk. 1300: informed that patient may consider [address], faxed face sheet,    1500, spoke with Nils Ag of [facility], not able to accept the patient's insurance. She states the [facility] facility can, and the patient will be able to smoke, but it will be in shifts. Spoke with Ameena [phone]) she states that [facility] will not have case precerted until next week. Patient is agreeable to go to facility.

## 2021-05-07 NOTE — PROGRESS NOTES
Physical Therapy  Facility/Department: [address] MED SURG  Daily Treatment Note  NAME: Emi Daniels  : 1955  MRN: [id number]    Date of Service: 2021    Discharge Recommendations:  Patient would benefit from continued therapy after discharge   PT Equipment Recommendations  Equipment Needed: Yes  Mobility Devices: Walker;Canes  Walker: Rolling  Cane: Straight Cane  Other: Pt reports owning a RW, but is \"unsure of where it ended up\". Assessment   Body structures, Functions, Activity limitations: Decreased functional mobility ; Decreased posture;Decreased endurance;Decreased strength; Increased pain;Decreased balance  Assessment: Pt ambulated ~100ft CGA with use of SPC. Pt mildly unsteady throughout ambulation with a shuffling gait pattern, would recommend utilizing a RW for longer ambulation distances. She would benefit from continued skilled physical therapy to address endurance deficits and gait training and return to prior level of independence. Prognosis: Fair  PT Education: PT Role;Plan of Care;Goals; General Safety  REQUIRES PT FOLLOW UP: Yes  Activity Tolerance  Activity Tolerance: Patient Tolerated treatment well     Patient Diagnosis(es): The primary encounter diagnosis was Epigastric pain. Diagnoses of Right upper quadrant abdominal pain, Shortness of breath, and Injury of head of pancreas, subsequent encounter were also pertinent to this visit.      has a past medical history of Appendicitis, Cerebral artery occlusion with cerebral infarction ([address] CHF (congestive heart failure) ([address] Chronic respiratory failure with hypoxia and hypercapnia (HCC), Chronic rhinitis, Cigarette smoker, COPD (chronic obstructive pulmonary disease) ([address] Depression, Dysphagia, Essential hypertension, GERD (gastroesophageal reflux disease), Heart failure, diastolic, with acute decompensation ([address] Hepatitis C, chronic ([address] History of smoking at least 1 pack per day for at least 30 years, Hyperlipidemia, assistance;Decreased awareness of need for safety  Problem Solving: Assistance required to identify errors made;Assistance required to correct errors made  Insights: Decreased awareness of deficits  Initiation: Requires cues for some  Sequencing: Requires cues for some  Objective   Bed mobility  Supine to Sit: Unable to assess  Sit to Supine: Unable to assess  Scooting: Unable to assess  Comment: PATRICK, pt up in chair upon entry/exit this date. Transfers  Sit to Stand: Stand by assistance  Stand to sit: Stand by assistance  Comment: SPC used. Ambulation  Ambulation?: Yes  Ambulation 1  Surface: level tile  Device: Single point cane  Assistance: Contact guard assistance  Quality of Gait: mildly unsteady, utilized hallway rail at times  Gait Deviations: Decreased step length;Shuffles  Distance: 100ft  Comments: No LOB noted throughout session. Pt ambulates with significant shuffling of gait despite max verbal cueing to increase step length bilaterally. Pt states she feels more comfortable with a RW. Stairs/Curb  Stairs?: No     Balance  Posture: Fair  Sitting - Static: Good  Sitting - Dynamic: Good  Standing - Static: Good;-  Standing - Dynamic: Fair;+  Comments: standing balance assessed with SPC  Exercises  Hip Flexion: marches: x10  Knee Long Arc Quad: x10  Ankle Pumps: x10  Comments: Pt states she performs BLE throughout the day on her own.          Goals  Short term goals  Time Frame for Short term goals: 15  Short term goal 1: Pt to perform bed mobility independently  Short term goal 2: Demonstrate functional transfers independently  Short term goal 3: Ambulate 300ft w/ least restrictive AD with supervision  Short term goal 4: Ascend/descend 2 stairs with no rail to simulate community ambulation  Patient Goals   Patient goals : Did not state    Plan    Plan  Times per week: 4-5x/week  Current Treatment Recommendations: Strengthening, Transfer Training, Endurance Training, Patient/Caregiver Education & Training, ROM, Equipment Evaluation, Education, & procurement, Balance Training, Gait Training, Home Exercise Program, Functional Mobility Training, Stair training, Safety Education & Training  Safety Devices  Type of devices: Call light within reach, Gait belt, Nurse notified, Left in chair  Restraints  Initially in place: No     Therapy Time   Individual Concurrent Group Co-treatment   Time In 1323         Time Out 1334         Minutes 11         Timed Code Treatment Minutes: [address], [facility]

## 2021-05-08 PROCEDURE — 1200000000 HC SEMI PRIVATE

## 2021-05-08 PROCEDURE — 2580000003 HC RX 258: Performed by: EMERGENCY MEDICINE

## 2021-05-08 PROCEDURE — 6370000000 HC RX 637 (ALT 250 FOR IP): Performed by: EMERGENCY MEDICINE

## 2021-05-08 PROCEDURE — 94640 AIRWAY INHALATION TREATMENT: CPT

## 2021-05-08 PROCEDURE — 6360000002 HC RX W HCPCS: Performed by: EMERGENCY MEDICINE

## 2021-05-08 PROCEDURE — 6370000000 HC RX 637 (ALT 250 FOR IP): Performed by: STUDENT IN AN ORGANIZED HEALTH CARE EDUCATION/TRAINING PROGRAM

## 2021-05-08 RX ORDER — MORPHINE SULFATE 4 MG/ML
4 INJECTION, SOLUTION INTRAMUSCULAR; INTRAVENOUS ONCE
Status: DISCONTINUED | OUTPATIENT
Start: 2021-05-08 | End: 2021-05-13 | Stop reason: HOSPADM

## 2021-05-08 RX ORDER — ONDANSETRON 2 MG/ML
4 INJECTION INTRAMUSCULAR; INTRAVENOUS ONCE
Status: DISCONTINUED | OUTPATIENT
Start: 2021-05-08 | End: 2021-05-13 | Stop reason: HOSPADM

## 2021-05-08 RX ADMIN — MIRTAZAPINE 30 MG: 30 TABLET, FILM COATED ORAL at 21:13

## 2021-05-08 RX ADMIN — APIXABAN 5 MG: 5 TABLET, FILM COATED ORAL at 21:13

## 2021-05-08 RX ADMIN — APIXABAN 5 MG: 5 TABLET, FILM COATED ORAL at 08:31

## 2021-05-08 RX ADMIN — LISINOPRIL 10 MG: 10 TABLET ORAL at 08:31

## 2021-05-08 RX ADMIN — FENTANYL CITRATE 50 MCG: 50 INJECTION, SOLUTION INTRAMUSCULAR; INTRAVENOUS at 21:13

## 2021-05-08 RX ADMIN — GABAPENTIN 300 MG: 300 CAPSULE ORAL at 08:31

## 2021-05-08 RX ADMIN — DESMOPRESSIN ACETATE 40 MG: 0.2 TABLET ORAL at 21:13

## 2021-05-08 RX ADMIN — OXYCODONE HYDROCHLORIDE 5 MG: 5 TABLET ORAL at 17:28

## 2021-05-08 RX ADMIN — OXYCODONE HYDROCHLORIDE 5 MG: 5 TABLET ORAL at 21:47

## 2021-05-08 RX ADMIN — FAMOTIDINE 20 MG: 20 TABLET, FILM COATED ORAL at 21:13

## 2021-05-08 RX ADMIN — BUTALBITAL, ACETAMINOPHEN AND CAFFEINE 1 TABLET: 50; 325; 40 TABLET ORAL at 14:26

## 2021-05-08 RX ADMIN — OXYCODONE HYDROCHLORIDE 5 MG: 5 TABLET ORAL at 02:02

## 2021-05-08 RX ADMIN — OXYCODONE HYDROCHLORIDE 5 MG: 5 TABLET ORAL at 12:26

## 2021-05-08 RX ADMIN — CLOPIDOGREL 75 MG: 75 TABLET, FILM COATED ORAL at 08:31

## 2021-05-08 RX ADMIN — GABAPENTIN 300 MG: 300 CAPSULE ORAL at 19:09

## 2021-05-08 RX ADMIN — AMITRIPTYLINE HYDROCHLORIDE 10 MG: 10 TABLET, FILM COATED ORAL at 21:13

## 2021-05-08 RX ADMIN — CARVEDILOL 6.25 MG: 6.25 TABLET, FILM COATED ORAL at 17:28

## 2021-05-08 RX ADMIN — CARVEDILOL 6.25 MG: 6.25 TABLET, FILM COATED ORAL at 08:31

## 2021-05-08 RX ADMIN — SODIUM CHLORIDE, PRESERVATIVE FREE 10 ML: 5 INJECTION INTRAVENOUS at 21:13

## 2021-05-08 RX ADMIN — BUDESONIDE AND FORMOTEROL FUMARATE DIHYDRATE 2 PUFF: 80; 4.5 AEROSOL RESPIRATORY (INHALATION) at 07:23

## 2021-05-08 RX ADMIN — OXYCODONE HYDROCHLORIDE 5 MG: 5 TABLET ORAL at 08:30

## 2021-05-08 RX ADMIN — FAMOTIDINE 20 MG: 20 TABLET, FILM COATED ORAL at 08:31

## 2021-05-08 ASSESSMENT — PAIN SCALES - GENERAL
PAINLEVEL_OUTOF10: 0
PAINLEVEL_OUTOF10: 5
PAINLEVEL_OUTOF10: 8
PAINLEVEL_OUTOF10: 7
PAINLEVEL_OUTOF10: 5
PAINLEVEL_OUTOF10: 8

## 2021-05-08 NOTE — PROGRESS NOTES
CDU Daily Progress Note  Attending Physician       Pt Name: Susan Mitchell  MRN: [id number]  [place] 1955  Date of evaluation: 5/8/21    I performed a history and physical examination of the patient and discussed management with the resident. I reviewed the residents note and agree with the documented findings and plan of care. Any areas of disagreement are noted on the chart. I was personally present for the key portions of any procedures. I have documented in the chart those procedures where I was not present during the key portions. I have reviewed the emergency nurses triage note. I agree with the chief complaint, past medical history, past surgical history, allergies, medications, social and family history as documented unless otherwise noted below. Documentation of the HPI, Physical Exam and Medical Decision Making performed by medical students or scribes is based on my personal performance of the HPI, PE and MDM. For Physician Assistant/ Nurse Practitioner cases/documentation I have personally evaluated this patient and have completed at least one if not all key elements of the E/M (history, physical exam, and MDM). Additional findings are as noted. The Family History, Social History and Review of Systems are unchanged from the previous day. No significant events overnight. CTPE negative for PE or PNA. May have some psych issues as well. Psych consult.     Silvano Arango MD  Attending Physician  Critical Decision Unit

## 2021-05-08 NOTE — PROGRESS NOTES
Physical Therapy    DATE: 2021    NAME: Keith Woods  MRN: [id number]   : 1955      Patient not seen this date for Physical Therapy due to:    Refused secondary to having a headache. PT will keep her on for .       Electronically signed by Jamal Cortez PTA on 2021 at 2:49 PM

## 2021-05-08 NOTE — PROGRESS NOTES
OBS/CDU   RESIDENT NOTE    Patients PCP is: No primary care provider on file. Urbano Mann is having right quadrant abdominal pain. Patient continues to have flight of ideas, unable to understand multiple conversations had with the patient and there seems to be some memory deficiency. No acute events overnight. Has been able to tolerate a full diet without nausea or vomiting. She has been urinating on her own and is passing flatus. She denies fever, chills, nausea, vomiting, chest pain, shortness of breath, abdominal pain, focal weakness, numbness, tingling, urinary/bowel symptoms, vision changes, visual hallucinations, or headache. PHYSICAL EXAM      General: NAD, A&Ox3  Heent: EOMI, PERRLA  Neck: Supple, No JVD  Cardiovascular: RRR, Normal S1/S2  Pulmonary: CTA-B, No SOB  Abdomen: Soft, tender in the right upper quadrant, nondistended, +bowel sounds  Extremities: +2/4 distal pulses, No swelling  Neuro/Psych: No numbness or tingling, mentating at baseline    PERTINENT TEST /EXAMS      I have reviewed all available laboratory results.     MEDICATIONS CURRENT     therapeutic multivitamin-minerals 1 tablet, Daily  folic acid (FOLVITE) tablet 1 mg, Daily  thiamine mononitrate tablet 100 mg, Daily  vitamin B-12 (CYANOCOBALAMIN) tablet 250 mcg, Daily  ipratropium-albuterol (DUONEB) nebulizer solution 3 mL, Q4H PRN  butalbital-acetaminophen-caffeine (FIORICET, ESGIC) per tablet 1 tablet, Q12H  famotidine (PEPCID) tablet 20 mg, BID  budesonide-formoterol (SYMBICORT) 80-4.5 MCG/ACT inhaler 2 puff, BID  mirtazapine (REMERON) tablet 30 mg, Nightly  lisinopril (PRINIVIL;ZESTRIL) tablet 10 mg, Daily  gabapentin (NEURONTIN) capsule 300 mg, TID  ferrous sulfate (FE TABS 325) EC tablet 325 mg, Daily  clopidogrel (PLAVIX) tablet 75 mg, Daily  carvedilol (COREG) tablet 6.25 mg, BID WC  atorvastatin (LIPITOR) tablet 40 mg, Nightly  amitriptyline (ELAVIL) tablet 10 mg, Nightly  albuterol (PROVENTIL) nebulizer solution 2.5 mg, Q4H PRN  apixaban (ELIQUIS) tablet 5 mg, BID  sodium chloride flush 0.9 % injection 5-40 mL, 2 times per day  sodium chloride flush 0.9 % injection 5-40 mL, PRN  0.9 % sodium chloride infusion, PRN  acetaminophen (TYLENOL) tablet 650 mg, Q4H PRN  oxyCODONE (ROXICODONE) immediate release tablet 5 mg, Q4H PRN  ondansetron (ZOFRAN-ODT) disintegrating tablet 4 mg, Q8H PRN  fentaNYL (SUBLIMAZE) injection 50 mcg, Q4H PRN        All medication charted and reviewed. CONSULTS      IP CONSULT TO IV TEAM    ASSESSMENT/PLAN       Emi Daniels is a 74 y.o. female who presents with    1. Right upper quadrant abdominal tenderness  · Ultrasound of the right upper quadrant  · Consider psych evaluation for patient's memory loss and disassociation of  ideas. · Continue home medications and pain control  · Monitor vitals, labs, and imaging  · DISPO: pending consults and clinical improvement    --  Evan Dixon DO   Emergency Medicine Resident Physician, PGY-1    This dictation was generated by voice recognition computer software. Although all attempts are made to edit the dictation for accuracy, there may be errors in the transcription that are not intended.

## 2021-05-09 ENCOUNTER — APPOINTMENT (OUTPATIENT)
Dept: ULTRASOUND IMAGING | Age: 66
DRG: 282 | End: 2021-05-09
Payer: MEDICARE

## 2021-05-09 PROCEDURE — 94640 AIRWAY INHALATION TREATMENT: CPT

## 2021-05-09 PROCEDURE — 1200000000 HC SEMI PRIVATE

## 2021-05-09 PROCEDURE — 99254 IP/OBS CNSLTJ NEW/EST MOD 60: CPT | Performed by: PSYCHIATRY & NEUROLOGY

## 2021-05-09 PROCEDURE — 94664 DEMO&/EVAL PT USE INHALER: CPT

## 2021-05-09 PROCEDURE — 6370000000 HC RX 637 (ALT 250 FOR IP): Performed by: STUDENT IN AN ORGANIZED HEALTH CARE EDUCATION/TRAINING PROGRAM

## 2021-05-09 PROCEDURE — 76705 ECHO EXAM OF ABDOMEN: CPT

## 2021-05-09 PROCEDURE — 6370000000 HC RX 637 (ALT 250 FOR IP): Performed by: EMERGENCY MEDICINE

## 2021-05-09 PROCEDURE — 97116 GAIT TRAINING THERAPY: CPT

## 2021-05-09 RX ADMIN — FAMOTIDINE 20 MG: 20 TABLET, FILM COATED ORAL at 08:35

## 2021-05-09 RX ADMIN — OXYCODONE HYDROCHLORIDE 5 MG: 5 TABLET ORAL at 17:40

## 2021-05-09 RX ADMIN — OXYCODONE HYDROCHLORIDE 5 MG: 5 TABLET ORAL at 08:35

## 2021-05-09 RX ADMIN — MIRTAZAPINE 30 MG: 30 TABLET, FILM COATED ORAL at 20:03

## 2021-05-09 RX ADMIN — CLOPIDOGREL 75 MG: 75 TABLET, FILM COATED ORAL at 08:35

## 2021-05-09 RX ADMIN — BUTALBITAL, ACETAMINOPHEN AND CAFFEINE 1 TABLET: 50; 325; 40 TABLET ORAL at 12:24

## 2021-05-09 RX ADMIN — DESMOPRESSIN ACETATE 40 MG: 0.2 TABLET ORAL at 20:04

## 2021-05-09 RX ADMIN — GABAPENTIN 300 MG: 300 CAPSULE ORAL at 20:03

## 2021-05-09 RX ADMIN — FAMOTIDINE 20 MG: 20 TABLET, FILM COATED ORAL at 20:04

## 2021-05-09 RX ADMIN — APIXABAN 5 MG: 5 TABLET, FILM COATED ORAL at 20:04

## 2021-05-09 RX ADMIN — APIXABAN 5 MG: 5 TABLET, FILM COATED ORAL at 08:35

## 2021-05-09 RX ADMIN — AMITRIPTYLINE HYDROCHLORIDE 10 MG: 10 TABLET, FILM COATED ORAL at 20:04

## 2021-05-09 RX ADMIN — OXYCODONE HYDROCHLORIDE 5 MG: 5 TABLET ORAL at 12:24

## 2021-05-09 RX ADMIN — CARVEDILOL 6.25 MG: 6.25 TABLET, FILM COATED ORAL at 08:35

## 2021-05-09 RX ADMIN — OXYCODONE HYDROCHLORIDE 5 MG: 5 TABLET ORAL at 21:24

## 2021-05-09 RX ADMIN — GABAPENTIN 300 MG: 300 CAPSULE ORAL at 08:35

## 2021-05-09 RX ADMIN — BUDESONIDE AND FORMOTEROL FUMARATE DIHYDRATE 2 PUFF: 80; 4.5 AEROSOL RESPIRATORY (INHALATION) at 21:06

## 2021-05-09 ASSESSMENT — PAIN SCALES - GENERAL
PAINLEVEL_OUTOF10: 3
PAINLEVEL_OUTOF10: 8
PAINLEVEL_OUTOF10: 2
PAINLEVEL_OUTOF10: 4

## 2021-05-09 NOTE — VIRTUAL HEALTH
Inpatient consult to Psychiatry  Consult performed by: Dena Lawrence MD  Consult ordered by: Brooke Mejias DO        Department of Psychiatry   Psychiatric Assessment      Thank you very much for allowing us to participate in the care of this patient. Reason for Consult: racing    HISTORY OF PRESENT ILLNESS:          The patient is a 74 y.o. female with significant history of heart failure, hepatitis, COPD, pancreatic tumor, who is admitted medically for abdominal pain. We are consulted for tangential thought processes    Patient reports a history of depression. Clearly meets criteria for major depressive episodes. However at present time denies being down or depressed nearly all day every day. She follows up with her outpatient psychiatrist Dr. Velma Boyd. She is denying prominent anhedonia at present time. Denying auditory or visual hallucinations. Denies past suicide attempts and denies any suicidal or homicidal ideation intent or plan. Patient does report feeling anxious about her situation. She does report that she feels keyed up or tense and on edge. She reports feeling fatigued with impaired concentration. She does feel that her worry is excessive compared to what others might experience in her situation. She endorses racing thoughts and difficulty concentrating secondary to her worry. She does acknowledge she often gets distracted in conversation because she cannot focus and has so many things going through her mind but she denies that these things are random thoughts but are more related to her worries. Next    Slums exam was performed. Nurse Denise Lee assisted in the slums examination for the clock drawing in the figure portions of the examination. Patient scored a 14 out of 30 on the slums examination. I did ask Denise Lee to place the clock drawing and figures in the chart. She scored one-point on questions 1, 2, 3, 6, 8, and 10. She scored 4 points on questions 9 and 11.   No other points scored. Per her own report she completed ninth grade however previous note states that she completed 11th grade. Regardless she is screening in the dementia range. I discussed with both the patient and the nurse that there are multiple factors going on here that could contribute to her present score. She is anxious. She is also on multiple medications including opiates, gabapentin and others that may impact her cognition. She also may have a neurocognitive impairment. I discussed with the patient and the nurse that the recommendations would be to establish with a neuropsychologist for neuropsychological testing once the patient stabilized with her living situation. This is outpatient based. Then she could follow-up for further work-up of any dementia process. Of note patient became somewhat defensive when I discussed with her the possible role of medications, specifically opiates and gabapentin in her cognitive processes. She was adamant they had nothing to do with it. PSYCHIATRIC HISTORY:      · Outpatient psychiatric provider: Dr. Dumont with [facility]  · Suicide attempts: Denies  · Inpatient psychiatric admissions: Denies    Past psychiatric medications includes:     Mirtazapine and amitriptyline    Adverse reactions from psychotropic medications:    denies      Lifetime Psychiatric Review of Systems      ·    Obsessions and Compulsions: Denies    ·    Galina or Hypomania: Denies  ·    Hallucinations: Denies  ·    Panic Attacks:  Denies  ·    Delusions:  Denies  ·    Phobias:  Denies  ·    Trauma: Denies    Prior to Admission medications    Medication Sig Start Date End Date Taking? Authorizing Provider   oxyCODONE (ROXICODONE) 5 MG immediate release tablet Take 1 tablet by mouth every 6 hours as needed (Pain not relieved by Tylenol or ibuprofen) for up to 3 days.  5/7/21 5/10/21 Yes Tara Leung, DO   ondansetron (ZOFRAN-ODT) 4 MG disintegrating tablet Take 1 tablet by mouth every 8 hours as needed for Nausea or Vomiting 5/7/21  Yes Tara Leung, DO   polyethylene glycol (MIRALAX) 17 g packet Take 17 g by mouth daily as needed for Constipation 5/7/21 6/6/21 Yes Tara Leung DO   acetaminophen (TYLENOL) 500 MG tablet Take 2 tablets by mouth every 6 hours as needed for Pain 5/7/21  Yes Tara Leung, DO   famotidine (PEPCID) 20 MG tablet Take 1 tablet by mouth 2 times daily 4/6/21   Brittany Acevedo, DO   mometasone-formoterol [facility]) 100-5 MCG/ACT inhaler Inhale 2 puffs into the lungs 2 times daily 4/6/21   Brittany Acevedo DO   mirtazapine (REMERON) 30 MG tablet Take 1 tablet by mouth nightly 4/6/21   Brittany Acevedo, DO   lisinopril (PRINIVIL;ZESTRIL) 10 MG tablet Take 1 tablet by mouth daily 4/6/21   Brittany Acevedo, DO   gabapentin (NEURONTIN) 300 MG capsule Take 1 capsule by mouth 3 times daily for 30 days.  4/6/21 5/6/21  Brittany Acevedo, DO   ferrous sulfate (MELISSA-HAO) 325 (65 Fe) MG tablet Take 1 tablet by mouth daily 4/6/21   Brittany Acevedo, DO   clopidogrel (PLAVIX) 75 MG tablet Take 1 tablet by mouth daily 4/6/21   Brittany Acevedo, DO   carvedilol (COREG) 6.25 MG tablet Take 1 tablet by mouth 2 times daily (with meals) 4/6/21   Brittany Acevedo, DO   atorvastatin (LIPITOR) 40 MG tablet Take 1 tablet by mouth nightly 4/6/21   Brittany Acevedo, DO   apixaban (ELIQUIS) 5 MG TABS tablet Take 1 tablet by mouth 2 times daily 4/6/21   Brittany Acevedo, DO   amitriptyline (ELAVIL) 10 MG tablet Take 1 tablet by mouth nightly 4/6/21   Brittany Acevedo, DO   albuterol sulfate HFA (PROVENTIL HFA) 108 (90 Base) MCG/ACT inhaler Inhale 1-2 puffs into the lungs once for 1 dose 4/6/21 4/6/21  Brittany Acevedo DO   butalbital-acetaminophen-caffeine (FIORICET, ESGIC) -38 MG per tablet Take 1 tablet by mouth every 12 hours for 5 doses 3/28/21 3/31/21  Raúl Seals MD   Elastic Bandages & Supports (LUMBAR BACK BRACE/SUPPORT PAD) MISC 1 Units by Does not apply route daily 2/10/21 2/10/22  Ashwini Lawrence MD   amLODIPine (NORVASC) 10 MG tablet Take 1 tablet by mouth daily 2/9/21 2/9/21  Gabi Cardona MD   Sennosides (SENNA) 8.6 MG CAPS 8.6 capsules 8/2/20   Historical Provider, MD   Misc.  Devices [facility]) MISC 1 Device by Does not apply route once for 1 dose 8/5/19 8/5/19  Ally Morgan PA-C   ipratropium-albuterol (DUONEB) 0.5-2.5 (3) MG/3ML SOLN nebulizer solution Inhale 3 mLs into the lungs every 4 hours as needed for Shortness of Breath 1/11/19   Lawrence Lee MD   folic acid (FOLVITE) 1 MG tablet Take 1 tablet by mouth daily 10/13/18   Jones Braden MD   vitamin B-1 100 MG tablet Take 1 tablet by mouth daily 10/13/18   Jones Braden MD   vitamin B-12 250 MCG tablet Take 1 tablet by mouth daily 10/13/18   Jones Braden MD   Multiple Vitamins-Minerals (THERAPEUTIC MULTIVITAMIN-MINERALS) tablet Take 1 tablet by mouth daily 8/12/18   Ambar Joel MD        Medications:    Current Facility-Administered Medications: morphine injection 4 mg, 4 mg, Intravenous, Once  ondansetron (ZOFRAN) injection 4 mg, 4 mg, Intravenous, Once  therapeutic multivitamin-minerals 1 tablet, 1 tablet, Oral, Daily  folic acid (FOLVITE) tablet 1 mg, 1 mg, Oral, Daily  thiamine mononitrate tablet 100 mg, 100 mg, Oral, Daily  vitamin B-12 (CYANOCOBALAMIN) tablet 250 mcg, 250 mcg, Oral, Daily  ipratropium-albuterol (DUONEB) nebulizer solution 3 mL, 1 vial, Inhalation, Q4H PRN  butalbital-acetaminophen-caffeine (FIORICET, ESGIC) per tablet 1 tablet, 1 tablet, Oral, Q12H  famotidine (PEPCID) tablet 20 mg, 20 mg, Oral, BID  budesonide-formoterol (SYMBICORT) 80-4.5 MCG/ACT inhaler 2 puff, 2 puff, Inhalation, BID  mirtazapine (REMERON) tablet 30 mg, 30 mg, Oral, Nightly  lisinopril (PRINIVIL;ZESTRIL) tablet 10 mg, 10 mg, Oral, Daily  gabapentin (NEURONTIN) capsule 300 mg, 300 mg, Oral, TID  ferrous sulfate (FE TABS 325) EC tablet 325 mg, 325 mg, Oral, Daily  clopidogrel (PLAVIX) tablet 75 mg, 75 mg, Oral, Daily  carvedilol (COREG) tablet 6.25 mg, 6.25 mg, Oral, BID WC  atorvastatin 3/27/2017    APPENDECTOMY LAPAROSCOPIC performed by Jay Wheeler MD at [address] [facility]      hgsil    MEDICATION INJECTION Left 3/25/2021    STEROID INJECTION LEFT HIP  WITH FLUORO performed by Natalia Putnam MD at [address] [facility] N/A 11/10/2018    COLONOSCOPY WITH BIOPSY performed by Kaley Brody MD at [facility]    GA EGD TRANSORAL BIOPSY SINGLE/MULTIPLE N/A 2018    EGD ESOPHAGOGASTRODUODENOSCOPY performed by Marcia Graves MD at [address] [facility] 2019    HIP TOTAL ARTHROPLASTY ANTERIOR APPROACH - [facility],  C-ARM, performed by Makenna Leigh DO at [facility] 2008    oesophagitis, candidiasis    UPPER GASTROINTESTINAL ENDOSCOPY  2018    UPPER GASTROINTESTINAL ENDOSCOPY N/A 2018    EGD CONTROL HEMORRHAGE performed by Marcia Graves MD at [address] [place] [address] 2021    EGD W/EUS FNA performed by Elvira Boston MD at [facility]       Allergies: Aspirin and Atha Parish [cephalexin]      Social History:      RESIDENCE: homeless  : not    CHILDREN: 3 children, 2    OCCUPATION: Retired  EDUCATION: Ninth grade    SUBSTANCE USE HISTORY: etoh, cigarrettes, thc (in past).   Heroin in the past.      No urine drug screen on admission, patient presently being prescribed tramadol on outpatient basis        Family Medical and Psychiatric History:             Problem Relation Age of Onset    Breast Cancer Mother     Heart Disease Father          Physical  /86   Pulse 62   Temp 97.7 °F (36.5 °C) (Oral)   Resp 16   Ht 4' 11\" (1.499 m)   Wt 91 lb (41.3 kg)   LMP 2015   SpO2 96%   BMI 18.38 kg/m²         Mental Status Examination:  Level of consciousness:  Within normal limits  Appearance: hospital attire, lying in bed, fair grooming  Behavior/Motor:  no abnormalities noted  Attitude toward examiner:  cooperative, attentive and good eye contact  Speech:  Spontaneous, normal rate and volume  Mood: \"Fine\"  Affect: mood congruent  Thought processes:  Linear, goal directed, coherent  Thought content: Denies suicidal ideations   Denies homicidal ideations    Denies hallucinations   Denies delusions  Cognition: Oriented to self and general circumstance   concentration slums examination was 14 out of 30  Memory impairedslums examination 14 out of 30   Insight & Judgment: Limited      DSM-5 DIAGNOSIS:    Major depressive disorder in partial remission  Generalized anxiety disorder  Rule out delirium  Rule out neurocognitive impairment    Stressors     Prominent stressors of homelessness and recent diagnosed with tumor      PLAN:      Recommend the patient follow-up with outpatient neurocognitive testing with a neuropsychologist to establish baseline cognitive functioning. There is high suspicion that this patient may have a neurocognitive impairment occurring    Cannot rule out the impact of medications on her cognition, at this point this is a risk versus benefit discussion. Patient with chronic abdominal pain and frequent hospitalizations for such with a tumor in the pancreas that may or may not be operated on. There is a lot of medical evaluation and decision making that needs to occur to determine the appropriateness of these medications on an ongoing basis    Patient does not meet criteria for inpatient psychiatric hospitalization. We will sign off for now  Please do not hesitate to reconsult if any further questions  Additional recommendations will follow the clinical course. Thank you very much for allowing us to participate in the care of this patient. Time spent 45 min.       Electronically signed by Kaushal Headley MD on 5/9/21 at 10:16 AM EDT        Patient Location:  [place] [id number]    Provider Location (City/[facility]):   [address] [place] [address]    This virtual visit was conducted via interactive/real-time audio/video.

## 2021-05-09 NOTE — PROGRESS NOTES
OBS/CDU   RESIDENT NOTE    Patients PCP is: No primary care provider on file. SUBJECTIVE    Reports continuing having abdominal pain, states it has not really changed. States she is having a bowel movement without any difficulty, tolerating diet. No acute events overnight. Has been able to tolerate a full diet without nausea or vomiting. She has been urinating on her own and is passing flatus. She denies fever, chills, nausea, vomiting, chest pain, shortness of breath, focal weakness, numbness, tingling, urinary/bowel symptoms, vision changes, visual hallucinations, or headache. PHYSICAL EXAM      General: NAD, A&Ox3  Heent: EOMI, PERRLA  Neck: Supple, No JVD  Cardiovascular: RRR, Normal S1/S2  Pulmonary: CTA-B, No SOB  Abdomen: Soft, nontender, nondistended, +bowel sounds  Extremities: +2/4 distal pulses, No swelling  Neuro/Psych: No numbness or tingling, mentating at baseline    PERTINENT TEST /EXAMS      I have reviewed all available laboratory results.     MEDICATIONS CURRENT     morphine injection 4 mg, Once  ondansetron (ZOFRAN) injection 4 mg, Once  therapeutic multivitamin-minerals 1 tablet, Daily  folic acid (FOLVITE) tablet 1 mg, Daily  thiamine mononitrate tablet 100 mg, Daily  vitamin B-12 (CYANOCOBALAMIN) tablet 250 mcg, Daily  ipratropium-albuterol (DUONEB) nebulizer solution 3 mL, Q4H PRN  butalbital-acetaminophen-caffeine (FIORICET, ESGIC) per tablet 1 tablet, Q12H  famotidine (PEPCID) tablet 20 mg, BID  budesonide-formoterol (SYMBICORT) 80-4.5 MCG/ACT inhaler 2 puff, BID  mirtazapine (REMERON) tablet 30 mg, Nightly  lisinopril (PRINIVIL;ZESTRIL) tablet 10 mg, Daily  gabapentin (NEURONTIN) capsule 300 mg, TID  ferrous sulfate (FE TABS 325) EC tablet 325 mg, Daily  clopidogrel (PLAVIX) tablet 75 mg, Daily  carvedilol (COREG) tablet 6.25 mg, BID WC  atorvastatin (LIPITOR) tablet 40 mg, Nightly  amitriptyline (ELAVIL) tablet 10 mg, Nightly  albuterol (PROVENTIL) nebulizer solution 2.5 mg, Q4H PRN  apixaban (ELIQUIS) tablet 5 mg, BID  sodium chloride flush 0.9 % injection 5-40 mL, 2 times per day  sodium chloride flush 0.9 % injection 5-40 mL, PRN  0.9 % sodium chloride infusion, PRN  acetaminophen (TYLENOL) tablet 650 mg, Q4H PRN  oxyCODONE (ROXICODONE) immediate release tablet 5 mg, Q4H PRN  ondansetron (ZOFRAN-ODT) disintegrating tablet 4 mg, Q8H PRN  fentaNYL (SUBLIMAZE) injection 50 mcg, Q4H PRN        All medication charted and reviewed. CONSULTS      IP CONSULT TO IV TEAM  IP CONSULT TO PSYCHIATRY    ASSESSMENT/PLAN       Emi Daniels is a 74 y.o. female who presents with    1. Right upper quadrant abdominal pain  · Plan to obtain ultrasound today  · [facility] with [facility] having tangential thoughts and inability to comprehend minor medical decision making problems. · Plan to have psychiatric consult today at 10 AM.    · Continue home medications and pain control  · Monitor vitals, labs, and imaging  · DISPO: pending consults and clinical improvement    --  [facility],    Emergency Medicine Resident Physician, PGY-1    This dictation was generated by voice recognition computer software. Although all attempts are made to edit the dictation for accuracy, there may be errors in the transcription that are not intended.

## 2021-05-09 NOTE — PROGRESS NOTES
Pt went off the unit to smoke and was asking another patient on the same unit for pain pills.  That pt then came back to the unit and notified RN due to concern

## 2021-05-09 NOTE — PROGRESS NOTES
[address]  CDU / OBSERVATION ENCOUNTER  ATTENDING NOTE       I performed a history and physical examination of the patient and discussed management with the resident or midlevel provider. I reviewed the resident or midlevel provider's note and agree with the documented findings and plan of care. Any areas of disagreement are noted on the chart. I was personally present for the key portions of any procedures. I have documented in the chart those procedures where I was not present during the key portions. I have reviewed the nurses notes. I agree with the chief complaint, past medical history, past surgical history, allergies, medications, social and family history as documented unless otherwise noted below. The Family history, social history, and ROS are effectively unchanged since admission unless noted elsewhere in the chart. No new medical complaint. Awaiting precertification for transfer to [facility].     Archie Lane MD  Attending Emergency  Physician

## 2021-05-09 NOTE — PROGRESS NOTES
Strengthening, Transfer Training, Endurance Training, Patient/Caregiver Education & Training, ROM, Equipment Evaluation, Education, & procurement, Balance Training, Gait Training, Home Exercise Program, Functional Mobility Training, Stair training, Safety Education & Training  Safety Devices  Type of devices: Call light within reach, Gait belt, Nurse notified, Left in bed  Restraints  Initially in place: No     Therapy Time   Individual Concurrent Group Co-treatment   Time In 1405         Time Out 1422         Minutes [address] [place] [address]

## 2021-05-09 NOTE — PLAN OF CARE
Problem: Falls - Risk of:  Goal: Will remain free from falls  Description: Will remain free from falls  Outcome: Ongoing  Goal: Absence of physical injury  Description: Absence of physical injury  Outcome: Ongoing     Problem: Pain:  Goal: Pain level will decrease  Description: Pain level will decrease  Outcome: Ongoing  Goal: Control of acute pain  Description: Control of acute pain  Outcome: Ongoing  Goal: Control of chronic pain  Description: Control of chronic pain  Outcome: Ongoing     Problem: Nutrition  Goal: Optimal nutrition therapy  Outcome: Ongoing     Problem: Musculor/Skeletal Functional Status  Goal: Highest potential functional level  Outcome: Ongoing  Goal: Absence of falls  Outcome: Ongoing

## 2021-05-10 LAB
-: ABNORMAL
AMORPHOUS: ABNORMAL
BACTERIA: ABNORMAL
BILIRUBIN URINE: NEGATIVE
CASTS UA: ABNORMAL /LPF (ref 0–8)
COLOR: ABNORMAL
CRYSTALS, UA: ABNORMAL /HPF
EPITHELIAL CELLS UA: ABNORMAL /HPF (ref 0–5)
GLUCOSE URINE: NEGATIVE
KETONES, URINE: NEGATIVE
LEUKOCYTE ESTERASE, URINE: ABNORMAL
MUCUS: ABNORMAL
NITRITE, URINE: NEGATIVE
OTHER OBSERVATIONS UA: ABNORMAL
PH UA: 7 (ref 5–8)
PROTEIN UA: NEGATIVE
RBC UA: ABNORMAL /HPF (ref 0–4)
RENAL EPITHELIAL, UA: ABNORMAL /HPF
SPECIFIC GRAVITY UA: 1.02 (ref 1–1.03)
TRICHOMONAS: ABNORMAL
TURBIDITY: CLEAR
URINE HGB: NEGATIVE
UROBILINOGEN, URINE: NORMAL
WBC UA: ABNORMAL /HPF (ref 0–5)
YEAST: ABNORMAL

## 2021-05-10 PROCEDURE — 1200000000 HC SEMI PRIVATE

## 2021-05-10 PROCEDURE — 6370000000 HC RX 637 (ALT 250 FOR IP): Performed by: STUDENT IN AN ORGANIZED HEALTH CARE EDUCATION/TRAINING PROGRAM

## 2021-05-10 PROCEDURE — 97116 GAIT TRAINING THERAPY: CPT

## 2021-05-10 PROCEDURE — 6370000000 HC RX 637 (ALT 250 FOR IP): Performed by: EMERGENCY MEDICINE

## 2021-05-10 PROCEDURE — 6370000000 HC RX 637 (ALT 250 FOR IP): Performed by: NURSE PRACTITIONER

## 2021-05-10 PROCEDURE — 94760 N-INVAS EAR/PLS OXIMETRY 1: CPT

## 2021-05-10 PROCEDURE — 94640 AIRWAY INHALATION TREATMENT: CPT

## 2021-05-10 PROCEDURE — 81001 URINALYSIS AUTO W/SCOPE: CPT

## 2021-05-10 RX ORDER — DOCUSATE SODIUM 100 MG/1
100 CAPSULE, LIQUID FILLED ORAL 2 TIMES DAILY
Status: DISCONTINUED | OUTPATIENT
Start: 2021-05-10 | End: 2021-05-13 | Stop reason: HOSPADM

## 2021-05-10 RX ADMIN — AMITRIPTYLINE HYDROCHLORIDE 10 MG: 10 TABLET, FILM COATED ORAL at 20:16

## 2021-05-10 RX ADMIN — DESMOPRESSIN ACETATE 40 MG: 0.2 TABLET ORAL at 20:16

## 2021-05-10 RX ADMIN — OXYCODONE HYDROCHLORIDE 5 MG: 5 TABLET ORAL at 13:50

## 2021-05-10 RX ADMIN — MIRTAZAPINE 30 MG: 30 TABLET, FILM COATED ORAL at 20:16

## 2021-05-10 RX ADMIN — OXYCODONE HYDROCHLORIDE 5 MG: 5 TABLET ORAL at 17:54

## 2021-05-10 RX ADMIN — APIXABAN 5 MG: 5 TABLET, FILM COATED ORAL at 08:04

## 2021-05-10 RX ADMIN — GABAPENTIN 300 MG: 300 CAPSULE ORAL at 08:04

## 2021-05-10 RX ADMIN — BUDESONIDE AND FORMOTEROL FUMARATE DIHYDRATE 2 PUFF: 80; 4.5 AEROSOL RESPIRATORY (INHALATION) at 20:17

## 2021-05-10 RX ADMIN — OXYCODONE HYDROCHLORIDE 5 MG: 5 TABLET ORAL at 23:10

## 2021-05-10 RX ADMIN — APIXABAN 5 MG: 5 TABLET, FILM COATED ORAL at 20:17

## 2021-05-10 RX ADMIN — GABAPENTIN 300 MG: 300 CAPSULE ORAL at 20:16

## 2021-05-10 RX ADMIN — GABAPENTIN 300 MG: 300 CAPSULE ORAL at 13:50

## 2021-05-10 RX ADMIN — MAGNESIUM HYDROXIDE 30 ML: 400 SUSPENSION ORAL at 18:03

## 2021-05-10 RX ADMIN — BUTALBITAL, ACETAMINOPHEN AND CAFFEINE 1 TABLET: 50; 325; 40 TABLET ORAL at 20:23

## 2021-05-10 RX ADMIN — OXYCODONE HYDROCHLORIDE 5 MG: 5 TABLET ORAL at 07:59

## 2021-05-10 RX ADMIN — CARVEDILOL 6.25 MG: 6.25 TABLET, FILM COATED ORAL at 08:00

## 2021-05-10 RX ADMIN — LISINOPRIL 10 MG: 10 TABLET ORAL at 07:59

## 2021-05-10 RX ADMIN — CLOPIDOGREL 75 MG: 75 TABLET, FILM COATED ORAL at 08:00

## 2021-05-10 RX ADMIN — FAMOTIDINE 20 MG: 20 TABLET, FILM COATED ORAL at 20:16

## 2021-05-10 RX ADMIN — FAMOTIDINE 20 MG: 20 TABLET, FILM COATED ORAL at 08:00

## 2021-05-10 RX ADMIN — BUTALBITAL, ACETAMINOPHEN AND CAFFEINE 1 TABLET: 50; 325; 40 TABLET ORAL at 08:16

## 2021-05-10 RX ADMIN — CARVEDILOL 6.25 MG: 6.25 TABLET, FILM COATED ORAL at 17:54

## 2021-05-10 RX ADMIN — BUDESONIDE AND FORMOTEROL FUMARATE DIHYDRATE 2 PUFF: 80; 4.5 AEROSOL RESPIRATORY (INHALATION) at 09:12

## 2021-05-10 ASSESSMENT — PAIN SCALES - GENERAL
PAINLEVEL_OUTOF10: 8
PAINLEVEL_OUTOF10: 7
PAINLEVEL_OUTOF10: 8
PAINLEVEL_OUTOF10: 0
PAINLEVEL_OUTOF10: 7
PAINLEVEL_OUTOF10: 8

## 2021-05-10 NOTE — PROGRESS NOTES
[address]  CDU / OBSERVATION ENCOUNTER  ATTENDING NOTE       I performed a history and physical examination of the patient and discussed management with the resident or midlevel provider. I reviewed the resident or midlevel provider's note and agree with the documented findings and plan of care. Any areas of disagreement are noted on the chart. I was personally present for the key portions of any procedures. I have documented in the chart those procedures where I was not present during the key portions. I have reviewed the nurses notes. I agree with the chief complaint, past medical history, past surgical history, allergies, medications, social and family history as documented unless otherwise noted below. The Family history, social history, and ROS are effectively unchanged since admission unless noted elsewhere in the chart. Awaiting ECF acceptance. Patient is homeless and based on assessment from PT and OT patient would have significant difficulties with ADLs and taking care of herself. Patient has had approval previously to ECF. Patient will need assistance in getting to and from appointments. Patient has established diagnosis and is currently being followed by oncology, pain management, and GI. Urinalysis checked today. Negative.     Jesus Felder MD  Attending Emergency  Physician

## 2021-05-10 NOTE — PROGRESS NOTES
Comprehensive Nutrition Assessment    Type and Reason for Visit:  Reassess    Nutrition Recommendations/Plan: Continue current diet. Will provide Ensure Clear Liquid ONS with meals. Encourage/monitor PO intakes as tolerated. Will monitor plan of care. Nutrition Assessment:  Pt reports tolerating a General diet but taking fair amounts of meals. States abdominal pain worse today. Will provide ONS with meals. No recorded BM since admission. Labs/Meds reviewed. Malnutrition Assessment:  Malnutrition Status: Moderate malnutrition    Context:  Chronic Illness     Findings of the 6 clinical characteristics of malnutrition:  Energy Intake:  7 - 75% or less estimated energy requirements for 1 month or longer(Predicted)  Weight Loss:  No significant weight loss     Body Fat Loss:  1 - Mild body fat loss Orbital, Triceps   Muscle Mass Loss:  1 - Mild muscle mass loss Temples (temporalis), Clavicles (pectoralis & deltoids)  Fluid Accumulation:  No significant fluid accumulation   Strength:  Not Performed    Estimated Daily Nutrient Needs:  Energy (kcal):  30-35 kcal/kg = 0162-0882 kcals/day; Weight Used for Energy Requirements:  Admission     Protein (g):  1.2-1.5 gm/kg = 50-65 gm pro/day; Weight Used for Protein Requirements:  Ideal        Fluid (ml/day):  9507-2826 mL/day or per MD; Method Used for Fluid Requirements:  1 ml/kcal      Nutrition Related Findings:  Labs/Meds reviewed. C/o abdominal pain. No recorded BM since admission. Wounds:  None       Current Nutrition Therapies:    DIET GENERAL;     Anthropometric Measures:  · Height: 4' 11\" (149.9 cm)  · Current Body Weight: 91 lb (41.3 kg)   · Admission Body Weight: 91 lb (41.3 kg)    · Usual Body Weight: 94 lb 8 oz (42.9 kg)(4/3/21 bedscale per chart review)     · Ideal Body Weight: 95 lbs; % Ideal Body Weight 95.8 %   · BMI: 18.4  · BMI Categories: Underweight (BMI less than 18.5)       Nutrition Diagnosis:   · Inadequate oral intake related to (current medical condition; abdominal pain; decreased appetite) as evidenced by poor intake prior to admission, intake 0-25%, intake 26-50%, intake 51-75%, nausea    Nutrition Interventions:   Food and/or Nutrient Delivery:  Continue Current Diet, Start Oral Nutrition Supplement  Nutrition Education/Counseling:  No recommendation at this time   Coordination of Nutrition Care:  Continue to monitor while inpatient    Goals:  Oral intakes to meet % of estimated nutrition needs.        Nutrition Monitoring and Evaluation:   Food/Nutrient Intake Outcomes:  Food and Nutrient Intake, Supplement Intake  Physical Signs/Symptoms Outcomes:  Biochemical Data, GI Status, Constipation, Nausea or Vomiting, Nutrition Focused Physical Findings, Skin, Weight     Electronically signed by Puma Lara RD, LD on 5/10/21 at 3:32 PM EDT    Contact: [phone]

## 2021-05-10 NOTE — CARE COORDINATION
Mariah Guan at [address] is notified of request is states she will begin to pull information from [address] and let writer know. Maximus states that everything looks good, However[facility]. [facility] chose [facility] from provided list. Referral is sent. [address] from [facility] states that the patient is accepted and is starting the precert.

## 2021-05-10 NOTE — PROGRESS NOTES
Physician Progress Note      Addi Mayer  CSN #:                  [id number]  :                       1955  ADMIT DATE:       2021 5:53 PM  DISCH DATE:  RESPONDING  PROVIDER #:        BAUTISTA GREEN DO          QUERY TEXT:    Patient admitted with abdominal pain, noted dietician consult documenting   meets criteria for moderate malnutrition BMI of 18.4 . If possible, please   document in progress notes and discharge summary if you are evaluating and /or   treating any of the following: The medical record reflects the following:  Risk Factors: copd, chf, homeless  Clinical Indicators: BMI 18.4 dietician consult documents meets criteria for   moderate malnutrition with mild muscle mass and body fat loss. noted abdominal   pain and emesis  Treatment: nutritional supplements recommended per dietician    thank you Brady Martinez RN BSN [facility]  [phone]  Options provided:  -- Protein calorie malnutrition moderate  -- Protein calorie malnutrition mild  -- Other - I will add my own diagnosis  -- Disagree - Not applicable / Not valid  -- Disagree - Clinically unable to determine / Unknown  -- Refer to Clinical Documentation Reviewer    PROVIDER RESPONSE TEXT:    This patient has mild protein calorie malnutrition. Query created by:  Karla Mock on 2021 8:52 AM      Electronically signed by:  Clifford Medina DO 5/10/2021 7:55 AM

## 2021-05-10 NOTE — PROGRESS NOTES
OBS/CDU   RESIDENT NOTE    Patients PCP is: No primary care provider on file. SUBJECTIVE    Reports worsening abdominal pain. Patient noted to have a history of pancreatic cancer. Patient states she did not sleep well last night, does not feel well today. Patient denies any nausea, vomiting, chest pain, shortness of breath, fever or chills. Patient does state that she has increased urinary frequency and dysuria. Plan to obtain a UA today. Patient tolerating p.o. without difficulty, patient appears comfortable in bed. PHYSICAL EXAM      General: NAD, A&Ox3  Heent: EOMI, PERRLA  Neck: Supple, No JVD  Cardiovascular: RRR, Normal S1/S2  Pulmonary: CTA-B, No SOB  Abdomen: Soft, diffuse tenderness in no particular spot, voluntary guarding, nondistended, +bowel sounds  Extremities: +2/4 distal pulses, No swelling  Neuro/Psych: No numbness or tingling, mentating at baseline    PERTINENT TEST /EXAMS      I have reviewed all available laboratory results.     MEDICATIONS CURRENT     morphine injection 4 mg, Once  ondansetron (ZOFRAN) injection 4 mg, Once  therapeutic multivitamin-minerals 1 tablet, Daily  folic acid (FOLVITE) tablet 1 mg, Daily  thiamine mononitrate tablet 100 mg, Daily  vitamin B-12 (CYANOCOBALAMIN) tablet 250 mcg, Daily  ipratropium-albuterol (DUONEB) nebulizer solution 3 mL, Q4H PRN  butalbital-acetaminophen-caffeine (FIORICET, ESGIC) per tablet 1 tablet, Q12H  famotidine (PEPCID) tablet 20 mg, BID  budesonide-formoterol (SYMBICORT) 80-4.5 MCG/ACT inhaler 2 puff, BID  mirtazapine (REMERON) tablet 30 mg, Nightly  lisinopril (PRINIVIL;ZESTRIL) tablet 10 mg, Daily  gabapentin (NEURONTIN) capsule 300 mg, TID  ferrous sulfate (FE TABS 325) EC tablet 325 mg, Daily  clopidogrel (PLAVIX) tablet 75 mg, Daily  carvedilol (COREG) tablet 6.25 mg, BID WC  atorvastatin (LIPITOR) tablet 40 mg, Nightly  amitriptyline (ELAVIL) tablet 10 mg, Nightly  albuterol (PROVENTIL) nebulizer solution 2.5 mg, Q4H PRN apixaban (ELIQUIS) tablet 5 mg, BID  sodium chloride flush 0.9 % injection 5-40 mL, 2 times per day  sodium chloride flush 0.9 % injection 5-40 mL, PRN  0.9 % sodium chloride infusion, PRN  acetaminophen (TYLENOL) tablet 650 mg, Q4H PRN  oxyCODONE (ROXICODONE) immediate release tablet 5 mg, Q4H PRN  ondansetron (ZOFRAN-ODT) disintegrating tablet 4 mg, Q8H PRN  fentaNYL (SUBLIMAZE) injection 50 mcg, Q4H PRN        All medication charted and reviewed. CONSULTS      IP CONSULT TO IV TEAM  IP CONSULT TO PSYCHIATRY    ASSESSMENT/PLAN       Emi Daniels is a 74 y.o. female who presents with    1. No new complaint, patient does state that she still has abdominal pain  · Obtain UA  · Awaiting precertification for transfer to F    · Continue home medications and pain control  · Monitor vitals, labs, and imaging  · DISPO: pending consults and clinical improvement    --  Beny Strange DO   Emergency Medicine Resident Physician, PGY-1    This dictation was generated by voice recognition computer software. Although all attempts are made to edit the dictation for accuracy, there may be errors in the transcription that are not intended.

## 2021-05-10 NOTE — PROGRESS NOTES
Physical Therapy  Facility/Department: [address] MED SURG  Daily Treatment Note  NAME: Emi Daniels  : 1955  MRN: [id number]    Date of Service: 5/10/2021    Discharge Recommendations:  Patient would benefit from continued therapy after discharge   PT Equipment Recommendations  Equipment Needed: Yes  Mobility Devices: Coralyn Lashae: Rolling  Other: Pt reports owning a RW, but is \"unsure of where it ended up\". Assessment   Body structures, Functions, Activity limitations: Decreased functional mobility ; Decreased posture;Decreased endurance;Decreased strength; Increased pain;Decreased balance  Assessment: Pt ambulated ~250ft with RW, CGA. Pt was steady throughout with no LOB noted. She would benefit from continued PT to address functional mobility deficits and return to prior level of independence. Prognosis: Good  PT Education: PT Role;Plan of Care;Goals; General Safety;Gait Training;Transfer Training  REQUIRES PT FOLLOW UP: Yes  Activity Tolerance  Activity Tolerance: Patient limited by fatigue;Patient limited by endurance     Patient Diagnosis(es): The primary encounter diagnosis was Epigastric pain. Diagnoses of Right upper quadrant abdominal pain, Shortness of breath, and Injury of head of pancreas, subsequent encounter were also pertinent to this visit.      has a past medical history of Appendicitis, Cerebral artery occlusion with cerebral infarction ([address] CHF (congestive heart failure) ([address] Chronic respiratory failure with hypoxia and hypercapnia (HCC), Chronic rhinitis, Cigarette smoker, COPD (chronic obstructive pulmonary disease) ([address] Depression, Dysphagia, Essential hypertension, GERD (gastroesophageal reflux disease), Heart failure, diastolic, with acute decompensation ([address] Hepatitis C, chronic ([address] History of smoking at least 1 pack per day for at least 30 years, Hyperlipidemia, Hypertension, Migraine, Moderate COPD (chronic obstructive pulmonary disease) ([address] Multiple transfusions, Neurogenic dysphagia, Osteoarthritis, Pneumonia, Scoliosis, and Substance abuse ([facility] has a past surgical history that includes  section; LEEP (); Upper gastrointestinal endoscopy (); Gastric bypass surgery; Appendectomy (2017); laparoscopic appendectomy (N/A, 3/27/2017); Upper gastrointestinal endoscopy (2018); Upper gastrointestinal endoscopy (N/A, 2018); pr colonoscopy w/biopsy single/multiple (N/A, 11/10/2018); pr egd transoral biopsy single/multiple (N/A, 2018); hip surgery (Right, 2019); Total hip arthroplasty (Right, 2019); Medication Injection (Left, 3/25/2021); and Upper gastrointestinal endoscopy (N/A, 2021). Restrictions  Restrictions/Precautions  Restrictions/Precautions: Fall Risk  Required Braces or Orthoses?: No  Position Activity Restriction  Other position/activity restrictions: up with assist  Subjective   General  Chart Reviewed: Yes  Response To Previous Treatment: Patient with no complaints from previous session. Family / Caregiver Present: No  Subjective  Subjective: Pt sitting up on EOB getting dressed upon arrival. Pt wants to go downstairs to sit outside. Agreeable to walk with PT to the elevators. Pt has no c/o pain. General Comment  Comments: RN agreeable to pt leaving the unit to go outside.   Pain Screening  Patient Currently in Pain: Denies  Vital Signs  Patient Currently in Pain: Denies       Orientation  Orientation  Overall Orientation Status: Within Functional Limits  Objective   Bed mobility  Supine to Sit: Unable to assess  Sit to Supine: Unable to assess  Scooting: Unable to assess  Comment: Pt sitting up on EOB upon arrival.  Transfers  Sit to Stand: Stand by assistance  Stand to sit: Stand by assistance  Ambulation  Ambulation?: Yes  Ambulation 1  Surface: level tile  Device: Rolling Walker  Assistance: Contact guard assistance  Gait Deviations: Decreased step length;Shuffles;Decreased step height  Distance: ~250ft to the main elevator.   Comments: No LOB, steady  Stairs/Curb  Stairs?: No     Balance  Posture: Fair  Sitting - Static: Good  Sitting - Dynamic: Good  Standing - Static: Good;-  Standing - Dynamic: Fair;+  Comments: standing balance assessed with RW        Goals  Short term goals  Time Frame for Short term goals: 14  Short term goal 1: Pt to perform bed mobility independently  Short term goal 2: Demonstrate functional transfers independently  Short term goal 3: Ambulate 300ft w/ least restrictive AD with supervision  Short term goal 4: Ascend/descend 2 stairs with no rail to simulate community ambulation  Patient Goals   Patient goals : Did not state    Plan    Plan  Times per week: 4-5x/week  Current Treatment Recommendations: Strengthening, Transfer Training, Endurance Training, Patient/Caregiver Education & Training, ROM, Equipment Evaluation, Education, & procurement, Balance Training, Gait Training, Home Exercise Program, Functional Mobility Training, Stair training, Safety Education & Training  Safety Devices  Type of devices: Call light within reach, Gait belt, Nurse notified(Pt leaving unit to go outside, RN agreeable.)  Restraints  Initially in place: No     Therapy Time   Individual Concurrent Group Co-treatment   Time In 1503         Time Out 1521         Minutes 18         Timed Code Treatment Minutes: 18 Minutes       Karyn Duverney, PTA

## 2021-05-11 PROCEDURE — 97535 SELF CARE MNGMENT TRAINING: CPT

## 2021-05-11 PROCEDURE — 6370000000 HC RX 637 (ALT 250 FOR IP): Performed by: EMERGENCY MEDICINE

## 2021-05-11 PROCEDURE — 94760 N-INVAS EAR/PLS OXIMETRY 1: CPT

## 2021-05-11 PROCEDURE — 94640 AIRWAY INHALATION TREATMENT: CPT

## 2021-05-11 PROCEDURE — 6370000000 HC RX 637 (ALT 250 FOR IP): Performed by: NURSE PRACTITIONER

## 2021-05-11 PROCEDURE — 97116 GAIT TRAINING THERAPY: CPT

## 2021-05-11 PROCEDURE — 1200000000 HC SEMI PRIVATE

## 2021-05-11 PROCEDURE — 97110 THERAPEUTIC EXERCISES: CPT

## 2021-05-11 PROCEDURE — 2580000003 HC RX 258: Performed by: EMERGENCY MEDICINE

## 2021-05-11 PROCEDURE — 6370000000 HC RX 637 (ALT 250 FOR IP): Performed by: STUDENT IN AN ORGANIZED HEALTH CARE EDUCATION/TRAINING PROGRAM

## 2021-05-11 RX ORDER — OXYCODONE HYDROCHLORIDE 5 MG/1
5 TABLET ORAL EVERY 4 HOURS PRN
Status: DISCONTINUED | OUTPATIENT
Start: 2021-05-11 | End: 2021-05-13 | Stop reason: HOSPADM

## 2021-05-11 RX ORDER — OXYCODONE HYDROCHLORIDE 5 MG/1
5 TABLET ORAL ONCE
Status: COMPLETED | OUTPATIENT
Start: 2021-05-11 | End: 2021-05-11

## 2021-05-11 RX ORDER — OXYCODONE HYDROCHLORIDE 5 MG/1
10 TABLET ORAL EVERY 4 HOURS PRN
Status: DISCONTINUED | OUTPATIENT
Start: 2021-05-11 | End: 2021-05-13 | Stop reason: HOSPADM

## 2021-05-11 RX ADMIN — CARVEDILOL 6.25 MG: 6.25 TABLET, FILM COATED ORAL at 20:17

## 2021-05-11 RX ADMIN — DOCUSATE SODIUM 100 MG: 100 CAPSULE, LIQUID FILLED ORAL at 09:02

## 2021-05-11 RX ADMIN — CLOPIDOGREL 75 MG: 75 TABLET, FILM COATED ORAL at 09:02

## 2021-05-11 RX ADMIN — BUDESONIDE AND FORMOTEROL FUMARATE DIHYDRATE 2 PUFF: 80; 4.5 AEROSOL RESPIRATORY (INHALATION) at 21:07

## 2021-05-11 RX ADMIN — GABAPENTIN 300 MG: 300 CAPSULE ORAL at 09:01

## 2021-05-11 RX ADMIN — BUTALBITAL, ACETAMINOPHEN AND CAFFEINE 1 TABLET: 50; 325; 40 TABLET ORAL at 08:58

## 2021-05-11 RX ADMIN — LISINOPRIL 10 MG: 10 TABLET ORAL at 09:02

## 2021-05-11 RX ADMIN — FAMOTIDINE 20 MG: 20 TABLET, FILM COATED ORAL at 09:01

## 2021-05-11 RX ADMIN — BUTALBITAL, ACETAMINOPHEN AND CAFFEINE 1 TABLET: 50; 325; 40 TABLET ORAL at 20:18

## 2021-05-11 RX ADMIN — Medication 100 MG: at 09:01

## 2021-05-11 RX ADMIN — GABAPENTIN 300 MG: 300 CAPSULE ORAL at 14:00

## 2021-05-11 RX ADMIN — FAMOTIDINE 20 MG: 20 TABLET, FILM COATED ORAL at 20:17

## 2021-05-11 RX ADMIN — CARVEDILOL 6.25 MG: 6.25 TABLET, FILM COATED ORAL at 09:01

## 2021-05-11 RX ADMIN — FERROUS SULFATE TAB EC 325 MG (65 MG FE EQUIVALENT) 325 MG: 325 (65 FE) TABLET DELAYED RESPONSE at 09:01

## 2021-05-11 RX ADMIN — MIRTAZAPINE 30 MG: 30 TABLET, FILM COATED ORAL at 20:18

## 2021-05-11 RX ADMIN — Medication 1 TABLET: at 09:02

## 2021-05-11 RX ADMIN — OXYCODONE HYDROCHLORIDE 5 MG: 5 TABLET ORAL at 17:43

## 2021-05-11 RX ADMIN — DESMOPRESSIN ACETATE 40 MG: 0.2 TABLET ORAL at 20:18

## 2021-05-11 RX ADMIN — OXYCODONE HYDROCHLORIDE 5 MG: 5 TABLET ORAL at 17:05

## 2021-05-11 RX ADMIN — Medication 250 MCG: at 09:01

## 2021-05-11 RX ADMIN — OXYCODONE HYDROCHLORIDE 5 MG: 5 TABLET ORAL at 13:05

## 2021-05-11 RX ADMIN — APIXABAN 5 MG: 5 TABLET, FILM COATED ORAL at 09:01

## 2021-05-11 RX ADMIN — OXYCODONE HYDROCHLORIDE 5 MG: 5 TABLET ORAL at 08:58

## 2021-05-11 RX ADMIN — AMITRIPTYLINE HYDROCHLORIDE 10 MG: 10 TABLET, FILM COATED ORAL at 20:17

## 2021-05-11 RX ADMIN — OXYCODONE HYDROCHLORIDE 10 MG: 5 TABLET ORAL at 23:23

## 2021-05-11 RX ADMIN — SODIUM CHLORIDE, PRESERVATIVE FREE 5 ML: 5 INJECTION INTRAVENOUS at 09:06

## 2021-05-11 RX ADMIN — BUDESONIDE AND FORMOTEROL FUMARATE DIHYDRATE 2 PUFF: 80; 4.5 AEROSOL RESPIRATORY (INHALATION) at 08:50

## 2021-05-11 RX ADMIN — APIXABAN 5 MG: 5 TABLET, FILM COATED ORAL at 20:18

## 2021-05-11 RX ADMIN — FOLIC ACID 1 MG: 1 TABLET ORAL at 09:01

## 2021-05-11 RX ADMIN — GABAPENTIN 300 MG: 300 CAPSULE ORAL at 20:17

## 2021-05-11 ASSESSMENT — PAIN DESCRIPTION - PAIN TYPE
TYPE: CHRONIC PAIN

## 2021-05-11 ASSESSMENT — PAIN DESCRIPTION - ONSET: ONSET: ON-GOING

## 2021-05-11 ASSESSMENT — PAIN SCALES - GENERAL
PAINLEVEL_OUTOF10: 8
PAINLEVEL_OUTOF10: 8
PAINLEVEL_OUTOF10: 0
PAINLEVEL_OUTOF10: 6
PAINLEVEL_OUTOF10: 6
PAINLEVEL_OUTOF10: 8

## 2021-05-11 ASSESSMENT — PAIN DESCRIPTION - LOCATION
LOCATION: BACK
LOCATION: BACK;RIB CAGE

## 2021-05-11 ASSESSMENT — PAIN DESCRIPTION - DESCRIPTORS: DESCRIPTORS: DISCOMFORT

## 2021-05-11 ASSESSMENT — PAIN DESCRIPTION - FREQUENCY
FREQUENCY: CONTINUOUS
FREQUENCY: CONTINUOUS

## 2021-05-11 ASSESSMENT — PAIN DESCRIPTION - PROGRESSION: CLINICAL_PROGRESSION: NOT CHANGED

## 2021-05-11 NOTE — PROGRESS NOTES
OBS/CDU   RESIDENT NOTE    Patients PCP is: No primary care provider on file. SUBJECTIVE    Patient reports that her abdominal pain she experienced yesterday in her lower abdomen that radiated to her legs is improved. Patient states that she is tolerating p.o. without difficulty and denies any nausea or vomiting. UA yesterday demonstrated no urinary tract infection, patient does state she still has urinary increase, denies dysuria. Patient denies chest pain, shortness of breath, change in bowel habits, fever, chills. Patient states he rested well through the night. Patient has concerned about a plan for surgery, discussed with her that there is no plan for surgery at this time. Other review of documentation shows that she is potentially going to receive surgery from surgical oncology over at [facility] pancreatic endocrine surgery team, discussed this with our case management team to ensure that she can follow-up with us. PHYSICAL EXAM      General: NAD, A&Ox3  Heent: EOMI, PERRLA  Neck: Supple, No JVD  Cardiovascular: RRR, Normal S1/S2  Pulmonary: CTA-B, No SOB  Abdomen: Soft, mild tenderness in the right upper quadrant, nondistended, +bowel sounds  Extremities: +2/4 distal pulses, No swelling  Neuro/Psych: No numbness or tingling, mentating at baseline    PERTINENT TEST /EXAMS      I have reviewed all available laboratory results.     MEDICATIONS CURRENT     magnesium hydroxide (MILK OF MAGNESIA) 400 MG/5ML suspension 30 mL, Daily PRN  docusate sodium (COLACE) capsule 100 mg, BID  morphine injection 4 mg, Once  ondansetron (ZOFRAN) injection 4 mg, Once  therapeutic multivitamin-minerals 1 tablet, Daily  folic acid (FOLVITE) tablet 1 mg, Daily  thiamine mononitrate tablet 100 mg, Daily  vitamin B-12 (CYANOCOBALAMIN) tablet 250 mcg, Daily  ipratropium-albuterol (DUONEB) nebulizer solution 3 mL, Q4H PRN  butalbital-acetaminophen-caffeine (FIORICET, ESGIC) per tablet 1 tablet, Q12H  famotidine (PEPCID) tablet 20 mg, BID  budesonide-formoterol (SYMBICORT) 80-4.5 MCG/ACT inhaler 2 puff, BID  mirtazapine (REMERON) tablet 30 mg, Nightly  lisinopril (PRINIVIL;ZESTRIL) tablet 10 mg, Daily  gabapentin (NEURONTIN) capsule 300 mg, TID  ferrous sulfate (FE TABS 325) EC tablet 325 mg, Daily  clopidogrel (PLAVIX) tablet 75 mg, Daily  carvedilol (COREG) tablet 6.25 mg, BID WC  atorvastatin (LIPITOR) tablet 40 mg, Nightly  amitriptyline (ELAVIL) tablet 10 mg, Nightly  albuterol (PROVENTIL) nebulizer solution 2.5 mg, Q4H PRN  apixaban (ELIQUIS) tablet 5 mg, BID  sodium chloride flush 0.9 % injection 5-40 mL, 2 times per day  sodium chloride flush 0.9 % injection 5-40 mL, PRN  0.9 % sodium chloride infusion, PRN  acetaminophen (TYLENOL) tablet 650 mg, Q4H PRN  oxyCODONE (ROXICODONE) immediate release tablet 5 mg, Q4H PRN  ondansetron (ZOFRAN-ODT) disintegrating tablet 4 mg, Q8H PRN  fentaNYL (SUBLIMAZE) injection 50 mcg, Q4H PRN        All medication charted and reviewed. CONSULTS      IP CONSULT TO IV TEAM  IP CONSULT TO PSYCHIATRY    ASSESSMENT/PLAN       Emi Daniels is a 74 y.o. female who presents with    1. Abdominal pain with history of pancreatic mass and alcoholic cirrhosis. · Continue symptomatic treatment  · Patient unable to take care of herself, further evaluation for placement. · Continue home medications and pain control  · Monitor vitals, labs, and imaging  · DISPO: pending consults and clinical improvement    --  Jeet Dietrich DO   Emergency Medicine Resident Physician, PGY-1    This dictation was generated by voice recognition computer software. Although all attempts are made to edit the dictation for accuracy, there may be errors in the transcription that are not intended.

## 2021-05-11 NOTE — PROGRESS NOTES
Neurogenic dysphagia, Osteoarthritis, Pneumonia, Scoliosis, and Substance abuse ([facility] [address] has a past surgical history that includes  section; LEEP (); Upper gastrointestinal endoscopy (); Gastric bypass surgery; Appendectomy (2017); laparoscopic appendectomy (N/A, 3/27/2017); Upper gastrointestinal endoscopy (2018); Upper gastrointestinal endoscopy (N/A, 2018); pr colonoscopy w/biopsy single/multiple (N/A, 11/10/2018); pr egd transoral biopsy single/multiple (N/A, 2018); hip surgery (Right, 2019); Total hip arthroplasty (Right, 2019); Medication Injection (Left, 3/25/2021); and Upper gastrointestinal endoscopy (N/A, 2021). Restrictions  Restrictions/Precautions  Restrictions/Precautions: General Precautions, Fall Risk, Up as Tolerated  Required Braces or Orthoses?: No  Position Activity Restriction  Other position/activity restrictions: up with assist  Subjective   General  Patient assessed for rehabilitation services?: Yes  Family / Caregiver Present: No  Pain Assessment  Pain Assessment: 0-10  Pain Level: 6  Patient's Stated Pain Goal: No pain  Pain Type: Chronic pain  Pain Location: Back;Rib cage  Pain Orientation: Right  Pain Descriptors: Aching  Pain Frequency: Continuous  Clinical Progression: Not changed  Response to Pain Intervention: Patient Satisfied(pain meds due at 5pm)  Vital Signs  Patient Currently in Pain: Yes      Objective    ADL  Feeding: Independent;Setup; Increased time to complete(able to open containers and feed self)      Pt in bed upon arrival. Pt states \"the nurse helped me take a shower this morning\". Pt transferred to EOB and was able to don footies. STS and func mob to recliner on other side of bed w/CGA using SW. Vc's for proper and safe use of SW. Pt states was staying w/brother for a couple of days prior to admit to hospital. Pt states does not have a place to stay as of now. Pt educ on EC/WS tech and safety with func mob and adls. Pt verbalized understanding. Call light and phone in reach. RN notified.        Balance  Sitting Balance: Stand by assistance(supported and unsupported)  Standing Balance: Contact guard assistance(w/SW)  Standing Balance  Time: Pt stood for approx 2 min for transfer, func mob in room from EOB to recliner  Comment: w/SW and no LOB, vc's for proper use of SW  Functional Mobility  Functional - Mobility Device: Standard Walker  Assist Level: Contact guard assistance  Functional Mobility Comments: vc's for proper use of SW  Bed mobility  Rolling to Right: Stand by assistance  Supine to Sit: Stand by assistance  Sit to Supine: Unable to assess(pt left seated in recliner)  Scooting: Stand by assistance  Transfers  Stand Step Transfers: Stand by assistance  Sit to stand: Stand by assistance  Stand to sit: Stand by assistance  Transfer Comments: VCs for safety and hand placement      Plan   Plan  Times per week: 3-4 x/wk  Current Treatment Recommendations: Balance Training, Functional Mobility Training, Endurance Training, Cognitive Reorientation, Safety Education & Training, Patient/Caregiver Education & Training, Equipment Evaluation, Education, & procurement, Self-Care / ADL    Goals  Short term goals  Time Frame for Short term goals: By discharge, pt will:  Short term goal 1: Demo Mod I with use of LRD for functional transfers and functional mobility  Short term goal 2: Demo I with setup for UB ADLs and grooming tasks  Short term goal 3: Demo SBA with setup for LB ADLs and toileting tasks  Short term goal 4: Demo 8+ minutes dynamic standing task to increase independence with ADL/IADLs  Short term goal 5: Demo good safety awareness throughout therapy session with 0 VCs     Therapy Time   Individual Concurrent Group Co-treatment   Time In  1528         Time Out  1558         Minutes  30 total tx time                 MEETA IBRAHIM/AASHISH

## 2021-05-11 NOTE — CARE COORDINATION
TRANSITIONAL CARE PLANNING/ 2 Rehab Shai Day: 4    Reason for Admission: Abdominal pain [R10.9]          Readmission Risk              Risk of Unplanned Readmission:        48            Patient goals/Treatment Preferences/Transitional Plan: berenice to do onsite today   11:04 spoke to froy [phone] with berenice submitted for precert yesterday   57:23 called dr Danish Wiseman office spoke to christine office will arrange surgery clearance appt's with cardiology & pulmonary with son Tim Link gave her his contact information.  Told her that we were working on snf placement for patient at [facility] with berenice no work on [employer] yet

## 2021-05-11 NOTE — PROGRESS NOTES
Physical Therapy  Facility/Department: [address] MED SURG  Daily Treatment Note  NAME: Emi Daniels  : 1955  MRN: [id number]    Date of Service: 2021    Discharge Recommendations:  Patient would benefit from continued therapy after discharge   PT Equipment Recommendations  Equipment Needed: Yes  Mobility Devices: Tory Byes: Rolling    Assessment   Body structures, Functions, Activity limitations: Decreased functional mobility ; Decreased posture;Decreased endurance;Decreased strength; Increased pain;Decreased balance  Assessment: Pt ambulated ~100ft with RW, CGA. Pt was mildly unsteady throughout with no LOB noted. She would benefit from continued PT to address functional mobility deficits and return to prior level of independence. Prognosis: Good  PT Education: PT Role;Plan of Care;Goals; General Safety;Gait Training;Transfer Training;Functional Mobility Training  REQUIRES PT FOLLOW UP: Yes  Activity Tolerance  Activity Tolerance: Patient limited by fatigue;Patient limited by endurance  Activity Tolerance: Fatigue with AMB. Patient Diagnosis(es): The primary encounter diagnosis was Epigastric pain. Diagnoses of Right upper quadrant abdominal pain, Shortness of breath, and Injury of head of pancreas, subsequent encounter were also pertinent to this visit.      has a past medical history of Appendicitis, Cerebral artery occlusion with cerebral infarction ([address] CHF (congestive heart failure) ([address] Chronic respiratory failure with hypoxia and hypercapnia (HCC), Chronic rhinitis, Cigarette smoker, COPD (chronic obstructive pulmonary disease) ([address] Depression, Dysphagia, Essential hypertension, GERD (gastroesophageal reflux disease), Heart failure, diastolic, with acute decompensation ([address] Hepatitis C, chronic ([address] History of smoking at least 1 pack per day for at least 30 years, Hyperlipidemia, Hypertension, Migraine, Moderate COPD (chronic obstructive pulmonary disease) ([address] Multiple transfusions, Neurogenic dysphagia, Osteoarthritis, Pneumonia, Scoliosis, and Substance abuse ([facility] [address] has a past surgical history that includes  section; LEEP (); Upper gastrointestinal endoscopy (); Gastric bypass surgery; Appendectomy (2017); laparoscopic appendectomy (N/A, 3/27/2017); Upper gastrointestinal endoscopy (2018); Upper gastrointestinal endoscopy (N/A, 2018); pr colonoscopy w/biopsy single/multiple (N/A, 11/10/2018); pr egd transoral biopsy single/multiple (N/A, 2018); hip surgery (Right, 2019); Total hip arthroplasty (Right, 2019); Medication Injection (Left, 3/25/2021); and Upper gastrointestinal endoscopy (N/A, 2021). Restrictions  Restrictions/Precautions  Restrictions/Precautions: Fall Risk  Required Braces or Orthoses?: No  Position Activity Restriction  Other position/activity restrictions: up with assist  Subjective   General  Response To Previous Treatment: Patient with no complaints from previous session. Family / Caregiver Present: No  Subjective  Subjective: Pt and RN agreeable to PT. Pt alert in bed upon arrival, perseverating on needing to speak to Dr about possible surgery. Cooperative t/o treatment.   General Comment  Comments: Pt left in recliner with call light within reach, aide present in room  Pain Screening  Patient Currently in Pain: Yes  Pain Assessment  Pain Assessment: 0-10  Pain Level: 6  Pain Type: Chronic pain  Pain Location: Back  Pain Descriptors: Discomfort  Pain Frequency: Continuous  Pain Onset: On-going  Clinical Progression: Not changed  Functional Pain Assessment: Activities are not prevented  Non-Pharmaceutical Pain Intervention(s): Ambulation/Increased Activity;Repositioned  Vital Signs  Patient Currently in Pain: Yes       Orientation  Orientation  Overall Orientation Status: Within Functional Limits  Cognition      Objective   Bed mobility  Supine to Sit: Stand by assistance  Sit to Supine: (pt left seated [address], [place]

## 2021-05-11 NOTE — PROGRESS NOTES
[facility]  CDU / OBSERVATION eNCOUnter  Attending NOte       I performed a history and physical examination of the patient and discussed management with the resident. I reviewed the residents note and agree with the documented findings and plan of care. Any areas of disagreement are noted on the chart. I was personally present for the key portions of any procedures. I have documented in the chart those procedures where I was not present during the key portions. I have reviewed the nurses notes. I agree with the chief complaint, past medical history, past surgical history, allergies, medications, social and family history as documented unless otherwise noted below. The Family history, social history, and ROS are effectively unchanged since admission unless noted elsewhere in the chart. Pending ECF placement. The patient reportedly has a pancreatic head mass, and has been evaluated by surgical oncology at another institution with plans for possible resection. Patient requires cardiology and pulmonology clearance for surgery, which can be accomplished as an outpatient. Social work aware of this, and are currently helping to coordinate care. Patient smokes 1 cigarettes per day for 30 years. Smoking cessation counseling for 3 minutes. Discussed the effects of smoking in regards to cardiac disease, lung disease. I explained how this negatively effects their condition, will contribute to increased recovery time, and possible lead to further health problems in the future.         Wood Bocanegra MD  Attending Emergency  Physician

## 2021-05-11 NOTE — CARE COORDINATION
Writer spoke with Mihir Delgado pts son, discussed plan of care and pending precert for pt to go to [facility] . Writer asked if family had preference for Pulmonology and Cardiology follow up for surgical clearance, Mihir Delgado does not have preference . Will update family as info is available.

## 2021-05-12 PROBLEM — G43.809 MIGRAINE VARIANTS, NOT INTRACTABLE: Status: ACTIVE | Noted: 2020-01-27

## 2021-05-12 PROBLEM — G43.909 MIGRAINE: Status: ACTIVE | Noted: 2021-05-12

## 2021-05-12 PROBLEM — M25.551 PAIN OF RIGHT HIP JOINT: Status: ACTIVE | Noted: 2021-05-12

## 2021-05-12 PROBLEM — M19.90 ARTHRITIS: Status: ACTIVE | Noted: 2021-05-12

## 2021-05-12 PROCEDURE — 97116 GAIT TRAINING THERAPY: CPT

## 2021-05-12 PROCEDURE — 97110 THERAPEUTIC EXERCISES: CPT

## 2021-05-12 PROCEDURE — 6370000000 HC RX 637 (ALT 250 FOR IP): Performed by: EMERGENCY MEDICINE

## 2021-05-12 PROCEDURE — 94640 AIRWAY INHALATION TREATMENT: CPT

## 2021-05-12 PROCEDURE — 6370000000 HC RX 637 (ALT 250 FOR IP): Performed by: NURSE PRACTITIONER

## 2021-05-12 PROCEDURE — 1200000000 HC SEMI PRIVATE

## 2021-05-12 RX ADMIN — GABAPENTIN 300 MG: 300 CAPSULE ORAL at 09:30

## 2021-05-12 RX ADMIN — FOLIC ACID 1 MG: 1 TABLET ORAL at 09:30

## 2021-05-12 RX ADMIN — GABAPENTIN 300 MG: 300 CAPSULE ORAL at 15:22

## 2021-05-12 RX ADMIN — GABAPENTIN 300 MG: 300 CAPSULE ORAL at 20:20

## 2021-05-12 RX ADMIN — Medication 1 TABLET: at 09:30

## 2021-05-12 RX ADMIN — LISINOPRIL 10 MG: 10 TABLET ORAL at 09:30

## 2021-05-12 RX ADMIN — BUDESONIDE AND FORMOTEROL FUMARATE DIHYDRATE 2 PUFF: 80; 4.5 AEROSOL RESPIRATORY (INHALATION) at 19:21

## 2021-05-12 RX ADMIN — DESMOPRESSIN ACETATE 40 MG: 0.2 TABLET ORAL at 20:20

## 2021-05-12 RX ADMIN — CLOPIDOGREL 75 MG: 75 TABLET, FILM COATED ORAL at 09:30

## 2021-05-12 RX ADMIN — OXYCODONE HYDROCHLORIDE 10 MG: 5 TABLET ORAL at 19:31

## 2021-05-12 RX ADMIN — BUTALBITAL, ACETAMINOPHEN AND CAFFEINE 1 TABLET: 50; 325; 40 TABLET ORAL at 09:29

## 2021-05-12 RX ADMIN — MIRTAZAPINE 30 MG: 30 TABLET, FILM COATED ORAL at 20:20

## 2021-05-12 RX ADMIN — OXYCODONE HYDROCHLORIDE 10 MG: 5 TABLET ORAL at 15:22

## 2021-05-12 RX ADMIN — BUTALBITAL, ACETAMINOPHEN AND CAFFEINE 1 TABLET: 50; 325; 40 TABLET ORAL at 22:13

## 2021-05-12 RX ADMIN — CARVEDILOL 6.25 MG: 6.25 TABLET, FILM COATED ORAL at 17:51

## 2021-05-12 RX ADMIN — AMITRIPTYLINE HYDROCHLORIDE 10 MG: 10 TABLET, FILM COATED ORAL at 20:20

## 2021-05-12 RX ADMIN — FAMOTIDINE 20 MG: 20 TABLET, FILM COATED ORAL at 20:20

## 2021-05-12 RX ADMIN — Medication 100 MG: at 09:30

## 2021-05-12 RX ADMIN — CARVEDILOL 6.25 MG: 6.25 TABLET, FILM COATED ORAL at 09:30

## 2021-05-12 RX ADMIN — Medication 250 MCG: at 09:31

## 2021-05-12 RX ADMIN — OXYCODONE HYDROCHLORIDE 10 MG: 5 TABLET ORAL at 08:29

## 2021-05-12 RX ADMIN — DOCUSATE SODIUM 100 MG: 100 CAPSULE, LIQUID FILLED ORAL at 09:30

## 2021-05-12 RX ADMIN — APIXABAN 5 MG: 5 TABLET, FILM COATED ORAL at 20:20

## 2021-05-12 RX ADMIN — APIXABAN 5 MG: 5 TABLET, FILM COATED ORAL at 09:30

## 2021-05-12 ASSESSMENT — PAIN SCALES - GENERAL
PAINLEVEL_OUTOF10: 2
PAINLEVEL_OUTOF10: 6
PAINLEVEL_OUTOF10: 8
PAINLEVEL_OUTOF10: 8

## 2021-05-12 ASSESSMENT — PAIN DESCRIPTION - LOCATION: LOCATION: BACK;HIP

## 2021-05-12 ASSESSMENT — PAIN DESCRIPTION - ONSET: ONSET: ON-GOING

## 2021-05-12 ASSESSMENT — PAIN DESCRIPTION - FREQUENCY: FREQUENCY: CONTINUOUS

## 2021-05-12 NOTE — PROGRESS NOTES
OBS/CDU   RESIDENT NOTE    Patients PCP is: No primary care provider on file. SUBJECTIVE    Patient reports no new symptoms, abdominal pain has improved. Patient states that he slept well through the night. Patient denies any nausea, vomiting, chest pain, shortness of breath, abdominal pain, change in urination or bowel habits. Patient denies any fevers or chills. Vitals noted to be stable, patient noted to be afebrile here. Patient seen and examined while resting comfortably in bed. Awaiting placement to skilled nursing facility as patient has difficulty taking care of her self, noted to be homeless, with pancreatic mass. PHYSICAL EXAM      General: NAD, A&Ox3  Heent: EOMI, PERRLA  Neck: Supple, No JVD  Cardiovascular: RRR, Normal S1/S2  Pulmonary: CTA-B, No SOB  Abdomen: Soft, nontender, nondistended, +bowel sounds  Extremities: +2/4 distal pulses, No swelling  Neuro/Psych: No numbness or tingling, mentating at baseline    PERTINENT TEST /EXAMS      I have reviewed all available laboratory results.     MEDICATIONS CURRENT     oxyCODONE (ROXICODONE) immediate release tablet 5 mg, Q4H PRN    Or  oxyCODONE (ROXICODONE) immediate release tablet 10 mg, Q4H PRN  magnesium hydroxide (MILK OF MAGNESIA) 400 MG/5ML suspension 30 mL, Daily PRN  docusate sodium (COLACE) capsule 100 mg, BID  morphine injection 4 mg, Once  ondansetron (ZOFRAN) injection 4 mg, Once  therapeutic multivitamin-minerals 1 tablet, Daily  folic acid (FOLVITE) tablet 1 mg, Daily  thiamine mononitrate tablet 100 mg, Daily  vitamin B-12 (CYANOCOBALAMIN) tablet 250 mcg, Daily  ipratropium-albuterol (DUONEB) nebulizer solution 3 mL, Q4H PRN  butalbital-acetaminophen-caffeine (FIORICET, ESGIC) per tablet 1 tablet, Q12H  famotidine (PEPCID) tablet 20 mg, BID  budesonide-formoterol (SYMBICORT) 80-4.5 MCG/ACT inhaler 2 puff, BID  mirtazapine (REMERON) tablet 30 mg, Nightly  lisinopril (PRINIVIL;ZESTRIL) tablet 10 mg, Daily  gabapentin (NEURONTIN) capsule 300 mg, TID  ferrous sulfate (FE TABS 325) EC tablet 325 mg, Daily  clopidogrel (PLAVIX) tablet 75 mg, Daily  carvedilol (COREG) tablet 6.25 mg, BID WC  atorvastatin (LIPITOR) tablet 40 mg, Nightly  amitriptyline (ELAVIL) tablet 10 mg, Nightly  albuterol (PROVENTIL) nebulizer solution 2.5 mg, Q4H PRN  apixaban (ELIQUIS) tablet 5 mg, BID  sodium chloride flush 0.9 % injection 5-40 mL, 2 times per day  sodium chloride flush 0.9 % injection 5-40 mL, PRN  0.9 % sodium chloride infusion, PRN  acetaminophen (TYLENOL) tablet 650 mg, Q4H PRN  ondansetron (ZOFRAN-ODT) disintegrating tablet 4 mg, Q8H PRN  fentaNYL (SUBLIMAZE) injection 50 mcg, Q4H PRN        All medication charted and reviewed. CONSULTS      IP CONSULT TO IV TEAM  IP CONSULT TO PSYCHIATRY    ASSESSMENT/PLAN       Emi Daniels is a 74 y.o. female who presents with    1. Abdominal pain with history of pancreatic head mass  · Patient noted to be homeless, has inability to care for self. Patient pending placement. · Continue home medications and pain control  · Monitor vitals, labs, and imaging  · DISPO: pending consults and clinical improvement    --  Bebe Montero DO   Emergency Medicine Resident Physician, PGY-1    This dictation was generated by voice recognition computer software. Although all attempts are made to edit the dictation for accuracy, there may be errors in the transcription that are not intended.

## 2021-05-12 NOTE — CARE COORDINATION
TRANSITIONAL CARE PLANNING/ 2 Rehab Shai Day: 5    Reason for Admission: Abdominal pain [R10.9]       Readmission Risk              Risk of Unplanned Readmission:        50            Patient goals/Treatment Preferences/Transitional Plan:     Referrals Made: called marisa with berenice still await precert

## 2021-05-12 NOTE — CARE COORDINATION
Message left for Bernadette at [place] intake ([phone]) with Magdaleno to call writer regarding follow up on precert  5:75 Bernadette from [place] called, precert is still pending

## 2021-05-12 NOTE — PROGRESS NOTES
Physical Therapy  Facility/Department: [address] MED SURG  Daily Treatment Note  NAME: Emi Daniels  : 1955  MRN: [id number]    Date of Service: 2021    Discharge Recommendations:  Patient would benefit from continued therapy after discharge   PT Equipment Recommendations  Equipment Needed: Yes  Mobility Devices: Karan Keas: Rolling    Assessment   Body structures, Functions, Activity limitations: Decreased functional mobility ; Decreased posture;Decreased endurance;Decreased strength; Increased pain;Decreased balance  Assessment: Pt ambulated ~100ft x2 with RW, CGA. Pt was mildly unsteady throughout with no LOB noted. She would benefit from continued PT to address functional mobility deficits and return to prior level of independence. Prognosis: Good  PT Education: PT Role;Plan of Care;Goals; General Safety;Gait Training;Transfer Training;Functional Mobility Training  REQUIRES PT FOLLOW UP: Yes  Activity Tolerance  Activity Tolerance: Patient limited by endurance  Activity Tolerance: Fatigue with AMB. Patient Diagnosis(es): The primary encounter diagnosis was Epigastric pain. Diagnoses of Right upper quadrant abdominal pain, Shortness of breath, and Injury of head of pancreas, subsequent encounter were also pertinent to this visit.      has a past medical history of Appendicitis, Cerebral artery occlusion with cerebral infarction ([address] CHF (congestive heart failure) ([address] Chronic respiratory failure with hypoxia and hypercapnia (HCC), Chronic rhinitis, Cigarette smoker, COPD (chronic obstructive pulmonary disease) ([address] Depression, Dysphagia, Essential hypertension, GERD (gastroesophageal reflux disease), Heart failure, diastolic, with acute decompensation ([address] Hepatitis C, chronic ([address] History of smoking at least 1 pack per day for at least 30 years, Hyperlipidemia, Hypertension, Migraine, Moderate COPD (chronic obstructive pulmonary disease) ([address] Multiple transfusions, Neurogenic dysphagia, Osteoarthritis, Pneumonia, Scoliosis, and Substance abuse ([facility] [address] has a past surgical history that includes  section; LEEP (); Upper gastrointestinal endoscopy (); Gastric bypass surgery; Appendectomy (2017); laparoscopic appendectomy (N/A, 3/27/2017); Upper gastrointestinal endoscopy (2018); Upper gastrointestinal endoscopy (N/A, 2018); pr colonoscopy w/biopsy single/multiple (N/A, 11/10/2018); pr egd transoral biopsy single/multiple (N/A, 2018); hip surgery (Right, 2019); Total hip arthroplasty (Right, 2019); Medication Injection (Left, 3/25/2021); and Upper gastrointestinal endoscopy (N/A, 2021). Restrictions  Restrictions/Precautions  Restrictions/Precautions: General Precautions, Fall Risk, Up as Tolerated  Required Braces or Orthoses?: No  Position Activity Restriction  Other position/activity restrictions: up with assist  Subjective   General  Response To Previous Treatment: Patient with no complaints from previous session. Family / Caregiver Present: No  Subjective  Subjective: Pt and RN agreeable to PT. Pt standing in restroom upon PT arrival. Cooperative t/o treatment. General Comment  Comments: Pt left seated EOB with call light within reach  Pain Screening  Patient Currently in Pain: Yes  Pain Assessment  Pain Assessment: 0-10  Pain Level: 8  Pain Type: Chronic pain  Pain Location: Back; Hip  Pain Orientation: Left  Pain Descriptors: Aching;Discomfort  Pain Frequency: Continuous  Pain Onset: On-going  Clinical Progression: Not changed  Functional Pain Assessment: Activities are not prevented  Non-Pharmaceutical Pain Intervention(s): Ambulation/Increased Activity; Therapeutic presence  Vital Signs  Patient Currently in Pain: Yes       Orientation  Orientation  Overall Orientation Status: Within Functional Limits  Cognition      Objective   Bed mobility  Comment: PATRICK, pt standing in restroom upon arrival, left seated EOB  Transfers  Sit to Stand: Stand by assistance(pt sat down on EOB prior to amb to don socks)  Stand to sit: Stand by assistance  Comment: with use of RW  Ambulation  Ambulation?: Yes  Ambulation 1  Surface: level tile  Device: Rolling Walker  Assistance: Contact guard assistance  Quality of Gait: mildly unsteady, flexed posture  Gait Deviations: Decreased step length;Shuffles;Decreased step height  Distance: 100ft  x2  Comments: No LOB, decreased endurance noted  Stairs/Curb  Stairs?: No     Balance  Posture: Fair  Sitting - Static: Good  Sitting - Dynamic: Good  Standing - Static: Fair;+  Standing - Dynamic: Fair  Comments: standing balance assessed with RW  Exercises  Hip Flexion: x10 bilat  Hip Abduction: x10 bilat  Knee Long Arc Quad: x10 bilat  Ankle Pumps: x10 bilat  Upper Extremity: bicep curls and shoulder flexion x10 seated EOB     Goals  Short term goals  Time Frame for Short term goals: 14  Short term goal 1: Pt to perform bed mobility independently  Short term goal 2: Demonstrate functional transfers independently  Short term goal 3: Ambulate 300ft w/ least restrictive AD with supervision  Short term goal 4: Ascend/descend 2 stairs with no rail to simulate community ambulation  Patient Goals   Patient goals : Did not state    Plan    Plan  Times per week: 4-5x/week  Current Treatment Recommendations: Strengthening, Transfer Training, Endurance Training, Patient/Caregiver Education & Training, ROM, Equipment Evaluation, Education, & procurement, Balance Training, Gait Training, Home Exercise Program, Functional Mobility Training, Stair training, Safety Education & Training  Safety Devices  Type of devices: Call light within reach, Gait belt, Nurse notified  Restraints  Initially in place: No     Therapy Time   Individual Concurrent Group Co-treatment   Time In [id number]         Time Out 0940         Minutes 23         Timed Code Treatment Minutes: 23 Minutes       [place], [place]

## 2021-05-12 NOTE — PROGRESS NOTES
[facility]  [facility]ERV[facility]NCOUNTER  ATTENDING NOTE       I performed a history and physical examination of the patient and discussed management with the resident. I reviewed the residents note and agree with the documented findings and plan of care. Any areas of disagreement are noted on the chart. I was personally present for the key portions of any procedures. I have documented in the chart those procedures where I was not present during the key portions. I have reviewed the nurses notes. I agree with the chief complaint, past medical history, past surgical history, allergies, medications, social and family history as documented unless otherwise noted below. The Family history, social history, and ROS are effectively unchanged since admission unless noted elsewhere in the chart. The patient is a 67-year-old female who was admitted for abdominal pain. She was found to have a pancreatic head mass and has been evaluated by surgical oncology at [address] with plans for resection. Pain is better controlled this morning. Plan for placement.      Gina Hinds DO  Attending Emergency Physician

## 2021-05-13 VITALS
RESPIRATION RATE: 18 BRPM | BODY MASS INDEX: 18.35 KG/M2 | WEIGHT: 91 LBS | TEMPERATURE: 97.9 F | DIASTOLIC BLOOD PRESSURE: 82 MMHG | HEIGHT: 59 IN | OXYGEN SATURATION: 93 % | SYSTOLIC BLOOD PRESSURE: 134 MMHG | HEART RATE: 85 BPM

## 2021-05-13 PROCEDURE — 94640 AIRWAY INHALATION TREATMENT: CPT

## 2021-05-13 PROCEDURE — 97535 SELF CARE MNGMENT TRAINING: CPT

## 2021-05-13 PROCEDURE — 6370000000 HC RX 637 (ALT 250 FOR IP): Performed by: EMERGENCY MEDICINE

## 2021-05-13 PROCEDURE — 97116 GAIT TRAINING THERAPY: CPT

## 2021-05-13 PROCEDURE — 97530 THERAPEUTIC ACTIVITIES: CPT

## 2021-05-13 PROCEDURE — 6370000000 HC RX 637 (ALT 250 FOR IP): Performed by: NURSE PRACTITIONER

## 2021-05-13 RX ORDER — AMITRIPTYLINE HYDROCHLORIDE 10 MG/1
10 TABLET, FILM COATED ORAL NIGHTLY
Qty: 14 TABLET | Refills: 0 | Status: ON HOLD | OUTPATIENT
Start: 2021-05-13 | End: 2022-04-24 | Stop reason: HOSPADM

## 2021-05-13 RX ORDER — FAMOTIDINE 20 MG/1
20 TABLET, FILM COATED ORAL 2 TIMES DAILY
Qty: 28 TABLET | Refills: 0 | Status: SHIPPED | OUTPATIENT
Start: 2021-05-13 | End: 2021-08-18

## 2021-05-13 RX ORDER — LISINOPRIL 10 MG/1
10 TABLET ORAL DAILY
Qty: 14 TABLET | Refills: 0 | Status: SHIPPED | OUTPATIENT
Start: 2021-05-13 | End: 2021-08-18

## 2021-05-13 RX ORDER — ATORVASTATIN CALCIUM 40 MG/1
40 TABLET, FILM COATED ORAL DAILY
Qty: 14 TABLET | Refills: 0 | Status: ON HOLD | OUTPATIENT
Start: 2021-05-13 | End: 2022-04-24 | Stop reason: HOSPADM

## 2021-05-13 RX ORDER — CARVEDILOL 6.25 MG/1
6.25 TABLET ORAL 2 TIMES DAILY
Qty: 28 TABLET | Refills: 0 | Status: SHIPPED | OUTPATIENT
Start: 2021-05-13 | End: 2021-08-18

## 2021-05-13 RX ORDER — CLOPIDOGREL BISULFATE 75 MG/1
75 TABLET ORAL DAILY
Qty: 14 TABLET | Refills: 0 | Status: SHIPPED | OUTPATIENT
Start: 2021-05-13 | End: 2021-06-30 | Stop reason: CLARIF

## 2021-05-13 RX ORDER — LANOLIN ALCOHOL/MO/W.PET/CERES
325 CREAM (GRAM) TOPICAL DAILY
Qty: 90 TABLET | Refills: 3 | Status: SHIPPED | OUTPATIENT
Start: 2021-05-14

## 2021-05-13 RX ORDER — GABAPENTIN 300 MG/1
300 CAPSULE ORAL 2 TIMES DAILY
Qty: 28 CAPSULE | Refills: 0 | Status: SHIPPED | OUTPATIENT
Start: 2021-05-13 | End: 2021-06-30 | Stop reason: SDUPTHER

## 2021-05-13 RX ORDER — THIAMINE MONONITRATE (VIT B1) 100 MG
100 TABLET ORAL DAILY
Qty: 14 TABLET | Refills: 0 | Status: SHIPPED | OUTPATIENT
Start: 2021-05-14 | End: 2021-08-18

## 2021-05-13 RX ORDER — PSEUDOEPHEDRINE HCL 30 MG
100 TABLET ORAL 2 TIMES DAILY
Qty: 28 CAPSULE | Refills: 0 | Status: SHIPPED | OUTPATIENT
Start: 2021-05-13 | End: 2021-05-27

## 2021-05-13 RX ORDER — BUTALBITAL, ACETAMINOPHEN AND CAFFEINE 50; 325; 40 MG/1; MG/1; MG/1
1 TABLET ORAL EVERY 12 HOURS
Qty: 28 TABLET | Refills: 0 | Status: SHIPPED | OUTPATIENT
Start: 2021-05-13 | End: 2021-09-27

## 2021-05-13 RX ORDER — MIRTAZAPINE 30 MG/1
30 TABLET, FILM COATED ORAL NIGHTLY
Qty: 14 TABLET | Refills: 0 | Status: SHIPPED | OUTPATIENT
Start: 2021-05-13 | End: 2021-08-18

## 2021-05-13 RX ADMIN — CARVEDILOL 6.25 MG: 6.25 TABLET, FILM COATED ORAL at 08:39

## 2021-05-13 RX ADMIN — Medication 100 MG: at 08:39

## 2021-05-13 RX ADMIN — GABAPENTIN 300 MG: 300 CAPSULE ORAL at 14:33

## 2021-05-13 RX ADMIN — BUDESONIDE AND FORMOTEROL FUMARATE DIHYDRATE 2 PUFF: 80; 4.5 AEROSOL RESPIRATORY (INHALATION) at 08:43

## 2021-05-13 RX ADMIN — Medication 250 MCG: at 08:39

## 2021-05-13 RX ADMIN — APIXABAN 5 MG: 5 TABLET, FILM COATED ORAL at 08:39

## 2021-05-13 RX ADMIN — LISINOPRIL 10 MG: 10 TABLET ORAL at 08:39

## 2021-05-13 RX ADMIN — CLOPIDOGREL 75 MG: 75 TABLET, FILM COATED ORAL at 08:39

## 2021-05-13 RX ADMIN — GABAPENTIN 300 MG: 300 CAPSULE ORAL at 08:39

## 2021-05-13 RX ADMIN — DOCUSATE SODIUM 100 MG: 100 CAPSULE, LIQUID FILLED ORAL at 08:39

## 2021-05-13 RX ADMIN — FAMOTIDINE 20 MG: 20 TABLET, FILM COATED ORAL at 08:39

## 2021-05-13 RX ADMIN — Medication 1 TABLET: at 08:39

## 2021-05-13 RX ADMIN — OXYCODONE HYDROCHLORIDE 10 MG: 5 TABLET ORAL at 08:42

## 2021-05-13 RX ADMIN — FERROUS SULFATE TAB EC 325 MG (65 MG FE EQUIVALENT) 325 MG: 325 (65 FE) TABLET DELAYED RESPONSE at 08:39

## 2021-05-13 RX ADMIN — BUTALBITAL, ACETAMINOPHEN AND CAFFEINE 1 TABLET: 50; 325; 40 TABLET ORAL at 08:41

## 2021-05-13 RX ADMIN — OXYCODONE HYDROCHLORIDE 10 MG: 5 TABLET ORAL at 13:31

## 2021-05-13 RX ADMIN — FOLIC ACID 1 MG: 1 TABLET ORAL at 08:39

## 2021-05-13 ASSESSMENT — PAIN DESCRIPTION - FREQUENCY: FREQUENCY: CONTINUOUS

## 2021-05-13 ASSESSMENT — PAIN SCALES - GENERAL
PAINLEVEL_OUTOF10: 6
PAINLEVEL_OUTOF10: 8
PAINLEVEL_OUTOF10: 7

## 2021-05-13 ASSESSMENT — PAIN DESCRIPTION - PAIN TYPE: TYPE: CHRONIC PAIN

## 2021-05-13 ASSESSMENT — PAIN DESCRIPTION - ORIENTATION: ORIENTATION: LEFT

## 2021-05-13 ASSESSMENT — PAIN DESCRIPTION - LOCATION: LOCATION: BACK;HIP

## 2021-05-13 ASSESSMENT — PAIN DESCRIPTION - DESCRIPTORS: DESCRIPTORS: ACHING

## 2021-05-13 NOTE — CARE COORDINATION
Met with pt after she has been denied SNF placement. Pt states that she has no where to go. She states that her brother doesn't have room. Pt keeps calling nursing homes to see if she can be placed. Attempted to explain that the insurance company denied SNF placement so that would be the same at any  SNF. Called [facility] and handed the phone to pt. She spoke to [facility] and was told that none of the woman's shelter's have openings except   [facility].  She states that they will not take her back. Called [facility] and spoke to the manager. They stated that they really cannot handle her medically. [facility] in [facility] and they can accept her. Informed pt and she is willing to go to this shelter. Cab is ordered for 6:00pm. Pt provided some clothes.

## 2021-05-13 NOTE — DISCHARGE SUMMARY
CDU Discharge Summary        Patient:  Mikayla Vela  YOB: 1955    MRN: [id number]   Acct: [de-identified]    Primary Care Physician: No primary care provider on file. Admit date:  5/5/2021  5:53 PM  Discharge date: 05/13/21     Discharge Diagnoses:     Acute abdominal pain due to chronic pancreatic mass  Improved with symptomatic control     Follow-up:  Call today/tomorrow for a follow up appointment with No primary care provider on file. , or return to the Emergency Room with worsening symptoms    Stressed to patient the importance of following up with primary care doctor for further workup/management of symptoms. Pt verbalizes understanding and agrees with plan.     Discharge Medications:  Changes to medications          [facility] PA[facility]INE[facility][id number]    Printed on:05/13/21 1511   Medication Information                      acetaminophen (TYLENOL) 500 MG tablet  Take 2 tablets by mouth every 6 hours as needed for Pain             albuterol sulfate HFA (PROVENTIL HFA) 108 (90 Base) MCG/ACT inhaler  Inhale 1-2 puffs into the lungs once for 1 dose             amitriptyline (ELAVIL) 10 MG tablet  Take 1 tablet by mouth nightly             amitriptyline (ELAVIL) 10 MG tablet  Take 1 tablet by mouth nightly for 14 days             apixaban (ELIQUIS) 5 MG TABS tablet  Take 1 tablet by mouth 2 times daily             apixaban (ELIQUIS) 5 MG TABS tablet  Take 1 tablet by mouth 2 times daily for 14 days             atorvastatin (LIPITOR) 40 MG tablet  Take 1 tablet by mouth nightly             atorvastatin (LIPITOR) 40 MG tablet  Take 1 tablet by mouth daily for 14 days             butalbital-acetaminophen-caffeine (FIORICET, ESGIC) -40 MG per tablet  Take 1 tablet by mouth every 12 hours for 14 days             carvedilol (COREG) 6.25 MG tablet  Take 1 tablet by mouth 2 times daily (with meals)             carvedilol (COREG) 6.25 MG tablet  Take 1 tablet by mouth 2 times daily for 14 days             clopidogrel (PLAVIX) 75 MG tablet  Take 1 tablet by mouth daily             clopidogrel (PLAVIX) 75 MG tablet  Take 1 tablet by mouth daily             docusate sodium (COLACE, DULCOLAX) 100 MG CAPS  Take 100 mg by mouth 2 times daily for 14 days             Elastic Bandages & Supports (LUMBAR BACK BRACE/SUPPORT PAD) MISC  1 Units by Does not apply route daily             famotidine (PEPCID) 20 MG tablet  Take 1 tablet by mouth 2 times daily             famotidine (PEPCID) 20 MG tablet  Take 1 tablet by mouth 2 times daily for 14 days             ferrous sulfate (FE TABS 325) 325 (65 Fe) MG EC tablet  Take 1 tablet by mouth daily             ferrous sulfate (MELISSA-HAO) 325 (65 Fe) MG tablet  Take 1 tablet by mouth daily             folic acid (FOLVITE) 1 MG tablet  Take 1 tablet by mouth daily             gabapentin (NEURONTIN) 300 MG capsule  Take 1 capsule by mouth 3 times daily for 30 days. gabapentin (NEURONTIN) 300 MG capsule  Take 1 capsule by mouth 2 times daily for 14 days. Intended supply: 90 days             ipratropium-albuterol (DUONEB) 0.5-2.5 (3) MG/3ML SOLN nebulizer solution  Inhale 3 mLs into the lungs every 4 hours as needed for Shortness of Breath             lisinopril (PRINIVIL;ZESTRIL) 10 MG tablet  Take 1 tablet by mouth daily             lisinopril (PRINIVIL;ZESTRIL) 10 MG tablet  Take 1 tablet by mouth daily for 14 days             mirtazapine (REMERON) 30 MG tablet  Take 1 tablet by mouth nightly             mirtazapine (REMERON) 30 MG tablet  Take 1 tablet by mouth nightly for 14 days             [facility]  Devices [facility]) MISC  1 Device by Does not apply route once for 1 dose             mometasone-formoterol (DULERA) 100-5 MCG/ACT inhaler  Inhale 2 puffs into the lungs 2 times daily             Multiple Vitamins-Minerals (THERAPEUTIC MULTIVITAMIN-MINERALS) tablet  Take 1 tablet by mouth daily             ondansetron (ZOFRAN-ODT) 4 MG disintegrating tablet  Take 1 tablet by mouth every 8 hours as needed for Nausea or Vomiting             polyethylene glycol (MIRALAX) 17 g packet  Take 17 g by mouth daily as needed for Constipation             Sennosides (SENNA) 8.6 MG CAPS  8.6 capsules             thiamine mononitrate (THIAMINE) 100 MG tablet  Take 1 tablet by mouth daily for 14 days             vitamin B-1 100 MG tablet  Take 1 tablet by mouth daily             vitamin B-12 250 MCG tablet  Take 1 tablet by mouth daily                 Diet:  DIET GENERAL;  Dietary Nutrition Supplements: Standard High Calorie Oral Supplement , Advance as tolerated     Activity:  As tolerated    Consultants: IP CONSULT TO IV TEAM  IP CONSULT TO PSYCHIATRY  IP CONSULT TO SOCIAL WORK    Procedures:  Not indicated     Diagnostic Test:   Results for orders placed or performed during the hospital encounter of 05/05/21   COVID-19, Rapid    Specimen: Nasopharyngeal Swab   Result Value Ref Range    Specimen Description . NASOPHARYNGEAL SWAB     SARS-CoV-2, Rapid Not Detected Not Detected   CBC Auto Differential   Result Value Ref Range    WBC 10.3 3.5 - 11.3 k/uL    RBC 4.01 3.95 - 5.11 m/uL    Hemoglobin 13.3 11.9 - 15.1 g/dL    Hematocrit 42.4 36.3 - 47.1 %    .7 (H) 82.6 - 102.9 fL    MCH 33.2 25.2 - 33.5 pg    MCHC 31.4 28.4 - 34.8 g/dL    RDW 15.1 (H) 11.8 - 14.4 %    Platelets 150 (H) 776 - 453 k/uL    MPV 9.5 8.1 - 13.5 fL    NRBC Automated 0.0 0.0 per 100 WBC    Differential Type NOT REPORTED     WBC Morphology NOT REPORTED     RBC Morphology ANISOCYTOSIS PRESENT     Platelet Estimate NOT REPORTED     Seg Neutrophils 74 (H) 36 - 65 %    Lymphocytes 18 (L) 24 - 43 %    Monocytes 6 3 - 12 %    Eosinophils % 1 1 - 4 %    Basophils 0 0 - 2 %    Immature Granulocytes 1 (H) 0 %    Segs Absolute 7.65 1.50 - 8.10 k/uL    Absolute Lymph # 1.90 1.10 - 3.70 k/uL    Absolute Mono # 0.63 0.10 - 1.20 k/uL    Absolute Eos # 0.05 0.00 - 0.44 k/uL    Basophils Absolute <0.03 0.0 0.0 per 100 WBC    Differential Type NOT REPORTED     WBC Morphology NOT REPORTED     RBC Morphology ANISOCYTOSIS PRESENT     Platelet Estimate NOT REPORTED     Seg Neutrophils 63 36 - 65 %    Lymphocytes 25 24 - 43 %    Monocytes 10 3 - 12 %    Eosinophils % 1 1 - 4 %    Basophils 0 0 - 2 %    Immature Granulocytes 1 (H) 0 %    Segs Absolute 4.23 1.50 - 8.10 k/uL    Absolute Lymph # 1.66 1.10 - 3.70 k/uL    Absolute Mono # 0.66 0.10 - 1.20 k/uL    Absolute Eos # 0.09 0.00 - 0.44 k/uL    Basophils Absolute <0.03 0.00 - 0.20 k/uL    Absolute Immature Granulocyte 0.06 0.00 - 0.30 k/uL   HEPATIC FUNCTION PANEL   Result Value Ref Range    Albumin 4.0 3.5 - 5.2 g/dL    Alkaline Phosphatase 106 (H) 35 - 104 U/L    ALT 34 (H) 5 - 33 U/L    AST 54 (H) <32 U/L    Total Bilirubin 0.51 0.3 - 1.2 mg/dL    Bilirubin, Direct 0.30 <0.31 mg/dL    Bilirubin, Indirect 0.21 0.00 - 1.00 mg/dL    Total Protein 7.9 6.4 - 8.3 g/dL    Globulin NOT REPORTED 1.5 - 3.8 g/dL    Albumin/Globulin Ratio 1.0 1.0 - 2.5   BASIC METABOLIC PANEL   Result Value Ref Range    Glucose 78 70 - 99 mg/dL    BUN 17 8 - 23 mg/dL    CREATININE 0.74 0.50 - 0.90 mg/dL    Bun/Cre Ratio NOT REPORTED 9 - 20    Calcium 9.2 8.6 - 10.4 mg/dL    Sodium 133 (L) 135 - 144 mmol/L    Potassium 4.4 3.7 - 5.3 mmol/L    Chloride 101 98 - 107 mmol/L    CO2 21 20 - 31 mmol/L    Anion Gap 11 9 - 17 mmol/L    GFR Non-African American >60 >60 mL/min    GFR African American >60 >60 mL/min    GFR Comment          GFR Staging NOT REPORTED    PROTIME-INR   Result Value Ref Range    Protime 10.4 9.1 - 12.3 sec    INR 1.0    APTT   Result Value Ref Range    PTT 26.2 20.5 - 30.5 sec   Urinalysis with microscopic   Result Value Ref Range    Color, UA DARK YELLOW (A) YELLOW    Turbidity UA CLEAR CLEAR    Glucose, Ur NEGATIVE NEGATIVE    Bilirubin Urine NEGATIVE NEGATIVE    Ketones, Urine NEGATIVE NEGATIVE    Specific Gravity, UA 1.019 1.005 - 1.030    Urine Hgb NEGATIVE NEGATIVE    pH, UA 7.0 5.0 - 8.0    Protein, UA NEGATIVE NEGATIVE    Urobilinogen, Urine Normal Normal    Nitrite, Urine NEGATIVE NEGATIVE    Leukocyte Esterase, Urine TRACE (A) NEGATIVE    -          WBC, UA 2 TO 5 0 - 5 /HPF    RBC, UA 0 TO 2 0 - 4 /HPF    Casts UA  0 - 8 /LPF     5 TO 10 HYALINE Reference range defined for non-centrifuged specimen. Crystals, UA NOT REPORTED None /HPF    Epithelial Cells UA 2 TO 5 0 - 5 /HPF    Renal Epithelial, UA NOT REPORTED 0 /HPF    Bacteria, UA NOT REPORTED None    Mucus, UA NOT REPORTED None    Trichomonas, UA NOT REPORTED None    Amorphous, UA NOT REPORTED None    Other Observations UA NOT REPORTED NOT REQ. Yeast, UA NOT REPORTED None   EKG 12 Lead   Result Value Ref Range    Ventricular Rate 92 BPM    Atrial Rate 92 BPM    P-R Interval 144 ms    QRS Duration 68 ms    Q-T Interval 384 ms    QTc Calculation (Bazett) 474 ms    P Axis 68 degrees    R Axis 69 degrees    T Axis 78 degrees     Ct Abdomen W Contrast Additional Contrast? Radiologist Recommendation    Result Date: 4/23/2021  EXAMINATION: CT ABDOMEN WITH CONTRAST 4/23/2021 5:16 pm TECHNIQUE: CT of the abdomen was performed with the administration of intravenous contrast in the arterial and venous phases. Multiplanar reformatted images are provided for review. Dose modulation, iterative reconstruction, and/or weight based adjustment of the mA/kV was utilized to reduce the radiation dose to as low as reasonably achievable. COMPARISON: CT abdomen and pelvis 04/21/2021. CT abdomen and pelvis 01/17/2007. HISTORY: ORDERING SYSTEM PROVIDED HISTORY: pancreatic lesion TECHNOLOGIST PROVIDED HISTORY: pancreatic lesion FINDINGS: Lower Chest: Mild bibasilar atelectasis or scarring left greater than right. Organs: The liver is unremarkable. The gallbladder is decompressed. No biliary ductal dilatation. The spleen, bilateral adrenal glands, and bilateral kidneys are unremarkable.  A 2.5 x 2.4 x 2.2 cm peripherally enhancing lesion is again seen within the lesser sac either arising from or contacting the inferior aspect of the pancreatic body as measured on axial image 58 and coronal image 31. Central low density is seen. There is no significant change dating back to 01/17/2007. The pancreas is otherwise normal in appearance. GI/Bowel: No bowel obstruction or wall thickening identified. Peritoneum/Retroperitoneum: The abdominal aorta is normal in caliber with mild atherosclerotic vascular disease. No retroperitoneal or mesenteric lymphadenopathy is identified. No free air or fluid is seen in the abdomen. Bones/Soft Tissues: No acute osseous or soft tissue abnormality. No suspicious lytic or blastic osseous lesion. 2.5 cm peripherally enhancing lesion either arising from or contacting the inferior aspect of the pancreatic body within the lesser sac unchanged dating back to 2007. This is nonspecific but given the long-term stability is likely benign. Ct Abdomen Pelvis W Iv Contrast Additional Contrast? None    Result Date: 5/6/2021  EXAMINATION: CTA OF THE CHEST; CT OF THE ABDOMEN AND PELVIS WITH CONTRAST 5/5/2021 10:00 pm; 5/5/2021 11:13 pm TECHNIQUE: CTA of the chest was performed after the administration of intravenous contrast.  Multiplanar reformatted images are provided for review. MIP images are provided for review. Dose modulation, iterative reconstruction, and/or weight based adjustment of the mA/kV was utilized to reduce the radiation dose to as low as reasonably achievable.; CT of the abdomen and pelvis was performed with the administration of intravenous contrast. Multiplanar reformatted images are provided for review. Dose modulation, iterative reconstruction, and/or weight based adjustment of the mA/kV was utilized to reduce the radiation dose to as low as reasonably achievable.  COMPARISON: CT abdomen pelvis 04/23/2021,  01/17/2007 CT chest 07/30/2020 HISTORY: ORDERING SYSTEM PROVIDED HISTORY: CP, SOA, pos d dimer TECHNOLOGIST PROVIDED HISTORY: CP, SOA, pos d dimer Decision Support Exception - unselect if not a suspected or confirmed emergency medical condition->Emergency Medical Condition (MA) Reason for Exam: elevated d dimer Acuity: Acute Type of Exam: Initial; ORDERING SYSTEM PROVIDED HISTORY: RUQ pain TECHNOLOGIST PROVIDED HISTORY: RUQ pain FINDINGS: Chest: Images degraded by motion artifact. Pulmonary Arteries: Pulmonary arteries show no evidence of intraluminal filling defect to suggest pulmonary embolism. Main pulmonary artery is normal in caliber. Mediastinum: No evidence of mediastinal lymphadenopathy. The heart and pericardium demonstrate no acute abnormality. There is no acute abnormality of the thoracic aorta. Lungs/pleura: Degraded by motion artifact. No focal consolidation. No significant nodules or masses. No pleural effusion or pneumothorax. Soft Tissues/Bones: No acute bone or soft tissue abnormality. Abdomen/Pelvis: Organs: Liver shows no focal lesions. Spleen, pancreas, adrenal glands and kidneys enhance normally without significant abnormality. There appears to be some gallbladder wall edema with mucosal hyperenhancement. No appreciable cholelithiasis. The CBD is at upper limits of normal size. Pancreatic duct is not dilated. GI/Bowel: Stomach distended with fluid and food debris. No bowel distension. No focal small bowel lesions. Appendix not visualized but no evidence for acute appendicitis. Evaluation of the colon shows no acute process. Pelvis: Streak artifact from right hip prosthesis obscures portions of the lower pelvis. Limited views of the urinary bladder unremarkable. Hysterectomy noted. There is probable right Bartholin's cyst on series 6, image 159 about 2.5 cm. Peritoneum/Retroperitoneum: No ascites. No significant lymphadenopathy. Previously reported 1.5 cm apparent rim enhancing density along the inferior aspect of the body of the pancreas in the midline dates back to 2007. No major change. This would favor benign etiology. Bones/Soft Tissues: Right total hip prosthesis. Severe osteoarthritis of the left hip with deformity of the femoral head. Levoscoliosis lumbar spine. L4-5 degenerative changes. Chest: 1. No evidence for acute pulmonary embolism. 2. No evidence for pneumonia. CT abdomen pelvis: 1. CBD at upper limits of normal size. No cholelithiasis or choledocholithiasis noted but there appears to be some gallbladder wall edema with mucosal hyperenhancement. Please correlate clinically. If deemed clinically necessary ultrasound of the gallbladder may obtained. 2. No evidence for bowel obstruction. 3. No clear evidence for acute infective or inflammatory process. Ct Abdomen Pelvis W Iv Contrast Additional Contrast? None    Result Date: 4/21/2021  EXAMINATION: CT OF THE ABDOMEN AND PELVIS WITH CONTRAST 4/21/2021 12:58 pm TECHNIQUE: CT of the abdomen and pelvis was performed with the administration of intravenous contrast. Multiplanar reformatted images are provided for review. Dose modulation, iterative reconstruction, and/or weight based adjustment of the mA/kV was utilized to reduce the radiation dose to as low as reasonably achievable. COMPARISON: CT abdomen and pelvis performed 04/03/2021. HISTORY: ORDERING SYSTEM PROVIDED HISTORY: abdominal pain, recent FNA of pancreatic mass, on Anticoagulation TECHNOLOGIST PROVIDED HISTORY: abdominal pain, recent FNA of pancreatic mass, on Anticoagulation Decision Support Exception->Emergency Medical Condition (MA) Reason for Exam: abdominal pain, recent FNA of pancreatic mass, on Anticoagulation Acuity: Unknown Type of Exam: Unknown FINDINGS: Lower Chest: The lung bases are without consolidation or effusion. Visualized abdominal structures are unremarkable. Organs: The liver and spleen are normal size and overall attenuation. The gallbladder and adrenal glands are unremarkable.   There is redemonstration of a peripherally enhancing mass in the pancreatic body that is not significantly changed compared to prior exam.  The kidneys are without obstructive uropathy. The ureters are not dilated. The urinary bladder is unremarkable. GI/Bowel: The stomach is fluid-filled and otherwise unremarkable loops of small bowel are normal in caliber without evidence for obstruction. The colon contains air and fecal residue and is otherwise unremarkable. There is no intraperitoneal free air or free fluid. Ledy Grumbles Pelvis: Uterus appears age-appropriate. Peritoneum/Retroperitoneum: The psoas muscles are symmetric. The abdominal aorta is normal in caliber. The inferior vena cava is unremarkable. There is no retroperitoneal or mesenteric adenopathy. Bones/Soft Tissues: The extra-abdominal soft tissues are unremarkable. There is no acute osseous abnormality. No acute abdominal or pelvic abnormality. Redemonstration of a peripherally enhancing mass in the pancreatic body that is not significantly changed compared to prior examination. Us Gallbladder Ruq    Result Date: 5/9/2021  EXAMINATION: RIGHT UPPER QUADRANT ULTRASOUND 5/9/2021 7:51 am COMPARISON: CT abdomen pelvis from 04/23/2021 HISTORY: ORDERING SYSTEM PROVIDED HISTORY: cbd enlargement TECHNOLOGIST PROVIDED HISTORY: cbd enlargement 78-year-old female with common bile duct enlargement FINDINGS: LIVER:  The liver demonstrates normal echogenicity without evidence of intrahepatic biliary ductal dilatation. Color flow projects over the main portal vein with a normal hepatopetal, monophasic waveform. Liver length measures 14.3 cm. No liver mass evident within the limitations of this study. BILIARY SYSTEM:  Gallbladder is unremarkable without evidence of pericholecystic fluid, wall thickening or stones. Negative sonographic Tompkins's sign. Gallbladder wall thickness measures 3 mm. Common bile duct is mildly enlarged measuring up to 8 mm. RIGHT KIDNEY: Right kidney measures 8.0 x 3.5 x 3.7 cm. Right renal cortical thickness measures 1 cm. No gross right-sided hydronephrosis. PANCREAS:  Visualized portions of the pancreas are unremarkable. OTHER: No evidence of right upper quadrant ascites. 1. Enlarged common bile duct measuring 8 mm in diameter. No sonographic evidence for choledocholithiasis. Further evaluation with MRCP is recommended. 2. Otherwise, unremarkable right upper quadrant abdominal ultrasound. Xr Chest Portable    Result Date: 5/5/2021  EXAMINATION: ONE XRAY VIEW OF THE CHEST 5/5/2021 3:27 pm COMPARISON: 04/25/2021 HISTORY: ORDERING SYSTEM PROVIDED HISTORY: SOA TECHNOLOGIST PROVIDED HISTORY: SOA Reason for Exam: shortness of breath   upright port FINDINGS: Lungs are hyperinflated. The cardiac size is normal.  Mild chronic interstitial lung changes. No acute infiltrates or pleural effusions are seen. Pulmonary vascularity appears normal. There is mild ectasia of the thoracic aorta. Stable scoliosis. No acute bony abnormalities. The hilar structures are normal.  No other significant changes     No acute cardiopulmonary disease     Xr Chest Portable    Result Date: 4/25/2021  EXAMINATION: ONE XRAY VIEW OF THE CHEST 4/25/2021 2:26 pm COMPARISON: 03/29/2021 HISTORY: ORDERING SYSTEM PROVIDED HISTORY: cough TECHNOLOGIST PROVIDED HISTORY: cough FINDINGS: Subsegmental atelectasis in the left base. The cardiac size is normal. No acute infiltrates or pleural effusions are seen. Pulmonary vascularity appears normal. There is mild ectasia of the thoracic aorta. Stable scoliosisno acute bony abnormalities.  The hilar structures are normal.     Left basal subsegmental atelectasis     Ct Chest Pulmonary Embolism W Contrast    Result Date: 5/6/2021  EXAMINATION: CTA OF THE CHEST; CT OF THE ABDOMEN AND PELVIS WITH CONTRAST 5/5/2021 10:00 pm; 5/5/2021 11:13 pm TECHNIQUE: CTA of the chest was performed after the administration of intravenous contrast.  Multiplanar reformatted images are provided for distended with fluid and food debris. No bowel distension. No focal small bowel lesions. Appendix not visualized but no evidence for acute appendicitis. Evaluation of the colon shows no acute process. Pelvis: Streak artifact from right hip prosthesis obscures portions of the lower pelvis. Limited views of the urinary bladder unremarkable. Hysterectomy noted. There is probable right Bartholin's cyst on series 6, image 159 about 2.5 cm. Peritoneum/Retroperitoneum: No ascites. No significant lymphadenopathy. Previously reported 1.5 cm apparent rim enhancing density along the inferior aspect of the body of the pancreas in the midline dates back to 2007. No major change. This would favor benign etiology. Bones/Soft Tissues: Right total hip prosthesis. Severe osteoarthritis of the left hip with deformity of the femoral head. Levoscoliosis lumbar spine. L4-5 degenerative changes. Chest: 1. No evidence for acute pulmonary embolism. 2. No evidence for pneumonia. CT abdomen pelvis: 1. CBD at upper limits of normal size. No cholelithiasis or choledocholithiasis noted but there appears to be some gallbladder wall edema with mucosal hyperenhancement. Please correlate clinically. If deemed clinically necessary ultrasound of the gallbladder may obtained. 2. No evidence for bowel obstruction. 3. No clear evidence for acute infective or inflammatory process. Physical Exam:    General appearance - NAD, AOx 3   Lungs -CTAB, no R/R/R  Heart - RRR, no M/R/G  Abdomen - Soft, NT/ND  Neurological:  MAEx4, No focal motor deficit, sensory loss  Extremities - Cap refil <2 sec in all ext., no edema  Skin -warm, dry      Hospital Course:  Clinical course has improved, labs and imaging reviewed. Emi Daniels originally presented to the hospital on 5/5/2021  5:53 PM. with abdominal pain from her pancreatic mass. At that time it was determined that She required further observation and symptomatic control.  She was admitted and labs and imaging were followed daily. Imaging results as above. She is medically stable to be discharged. Disposition: Home    Patient stated that they will not drive themselves home from the hospital if they have gotten pain killers/ narcotics earlier that day and that they will arrange for transportation on their own or work with the  for a ride. Patient counseled NOT to drive while under the influence of narcotics/ pain killers. Condition: Good    Patient stable and ready for discharge home. I have discussed plan of care with patient and they are in understanding. They were instructed to read discharge paperwork. All of their questions and concerns were addressed. Time Spent: 6 day      --  Jeanette Tobias MD  Emergency Medicine Resident Physician    This dictation was generated by voice recognition computer software. Although all attempts are made to edit the dictation for accuracy, there may be errors in the transcription that are not intended.

## 2021-05-13 NOTE — PROGRESS NOTES
Physical Therapy  Facility/Department: [address] MED SURG  Daily Treatment Note  NAME: Emi Daniels  : 1955  MRN: [id number]    Date of Service: 2021    Discharge Recommendations:  Patient would benefit from continued therapy after discharge        Assessment   Body structures, Functions, Activity limitations: Decreased functional mobility ; Decreased posture;Decreased endurance;Decreased strength; Increased pain;Decreased balance  Assessment: Pt ambulated ~125ft with RW, CGA. Pt was mildly unsteady throughout with no LOB noted. She would benefit from continued PT to address functional mobility deficits and return to prior level of independence. Prognosis: Good  PT Education: Plan of Care;General Safety  Barriers to Learning: Pt easily agitated and gets frustrated. REQUIRES PT FOLLOW UP: Yes  Activity Tolerance  Activity Tolerance: Patient limited by endurance  Activity Tolerance: Fatigue with AMB. Patient Diagnosis(es): The primary encounter diagnosis was Epigastric pain. Diagnoses of Right upper quadrant abdominal pain, Shortness of breath, and Injury of head of pancreas, subsequent encounter were also pertinent to this visit. has a past medical history of Appendicitis, Cerebral artery occlusion with cerebral infarction ([address] CHF (congestive heart failure) ([address] Chronic respiratory failure with hypoxia and hypercapnia (HCC), Chronic rhinitis, Cigarette smoker, COPD (chronic obstructive pulmonary disease) ([address] Depression, Dysphagia, Essential hypertension, GERD (gastroesophageal reflux disease), Heart failure, diastolic, with acute decompensation ([address] Hepatitis C, chronic ([address] History of smoking at least 1 pack per day for at least 30 years, Hyperlipidemia, Hypertension, Migraine, Moderate COPD (chronic obstructive pulmonary disease) ([address] Multiple transfusions, Neurogenic dysphagia, Osteoarthritis, Pneumonia, Scoliosis, and Substance abuse ([address]    has a past surgical history that Supervision  Ambulation  Ambulation?: Yes  Ambulation 1  Surface: level tile  Device: Rolling Walker  Assistance: Contact guard assistance  Quality of Gait: mildly unsteady, flexed posture  Gait Deviations: Decreased step length;Shuffles;Decreased step height  Distance: 125 ft, 40 ft from chair to bathroom and back. Pt states she gets around the room with her walker all the time without assistance. Sitting in chair: BUE/LE AROM exercise x 10 15 reps. Pt does become SOB with activity. Stairs/Curb  Stairs?: No  Wheelchair Activities  Wheelchair Cushion: None  Propulsion: Yes  Propulsion 1  Propulsion: Manual  Level: Level Tile  Method: RUE;LUE;RLE;LLE  Level of Assistance: Independent  Distance: Pt uses WC independently to go outside. Balance  Posture: Fair  Sitting - Static: Good  Sitting - Dynamic: Good  Standing - Static: Fair;+  Standing - Dynamic: Fair  Comments: Balance assessed without rolling walker.     Goals  Short term goals  Time Frame for Short term goals: 15  Short term goal 1: Pt to perform bed mobility independently  Short term goal 2: Demonstrate functional transfers independently  Short term goal 3: Ambulate 300ft w/ least restrictive AD with supervision  Short term goal 4: Ascend/descend 2 stairs with no rail to simulate community ambulation  Patient Goals   Patient goals : Did not state    Plan    Plan  Times per week: 4-5x/week  Current Treatment Recommendations: Strengthening, Transfer Training, Endurance Training, Patient/Caregiver Education & Training, ROM, Equipment Evaluation, Education, & procurement, Balance Training, Gait Training, Home Exercise Program, Functional Mobility Training, Stair training, Safety Education & Training  Safety Devices  Type of devices: Left in chair, Gait belt, Call light within reach, Nurse notified  Restraints  Initially in place: No     Therapy Time   Individual Concurrent Group Co-treatment   Time In 0930         Time Out 0954         Minutes 24 Quinn Born, PT

## 2021-05-13 NOTE — PROGRESS NOTES
[facility]OUN[facility]  ATTENDING NOTE       I performed a history and physical examination of the patient and discussed management with the resident or midlevel provider. I reviewed the resident or midlevel provider's note and agree with the documented findings and plan of care. Any areas of disagreement are noted on the chart. I was personally present for the key portions of any procedures. I have documented in the chart those procedures where I was not present during the key portions. I have reviewed the nurses notes. I agree with the chief complaint, past medical history, past surgical history, allergies, medications, social and family history as documented unless otherwise noted below. The Family history, social history, and ROS are effectively unchanged since admission unless noted elsewhere in the chart. Still awaiting patient placement. Patient doing well medically. Symptoms effectively improved but no acute change.   Awaiting precertification    Bed found for patient in [facility] [address] [facility]  Attending Emergency  Physician

## 2021-05-13 NOTE — PROGRESS NOTES
dysphagia, Osteoarthritis, Pneumonia, Scoliosis, and Substance abuse ([facility] has a past surgical history that includes  section; LEEP (); Upper gastrointestinal endoscopy (); Gastric bypass surgery; Appendectomy (2017); laparoscopic appendectomy (N/A, 3/27/2017); Upper gastrointestinal endoscopy (2018); Upper gastrointestinal endoscopy (N/A, 2018); pr colonoscopy w/biopsy single/multiple (N/A, 11/10/2018); pr egd transoral biopsy single/multiple (N/A, 2018); hip surgery (Right, 2019); Total hip arthroplasty (Right, 2019); Medication Injection (Left, 3/25/2021); and Upper gastrointestinal endoscopy (N/A, 2021). Restrictions  Restrictions/Precautions  Restrictions/Precautions: General Precautions, Fall Risk, Up as Tolerated  Required Braces or Orthoses?: No  Position Activity Restriction  Other position/activity restrictions: up with assist  Subjective   General  Patient assessed for rehabilitation services?: Yes  Family / Caregiver Present: No  Pain Assessment  Pain Level: 6  Pain Location: Hip;Back  Non-Pharmaceutical Pain Intervention(s): Ambulation/Increased Activity; Distraction; Emotional support  Vital Signs  Patient Currently in Pain: Yes   Orientation  Orientation  Overall Orientation Status: Within Functional Limits  Objective    ADL  Grooming: Stand by assistance;Setup; Increased time to complete  LE Dressing: Supervision  Additional Comments: Pt. resting in recliner upon entering room. Pt. agreeable to OT services. Pt. tolerated handwashing standing at sink with no LOB, however SBA provided d/t decreased safety and unstable at times. Dof/don shoes sitting at edge of recliner chair at S level with increased time and effort. Pt. declined further grooming or ADLs. Per PT pt. dressed herself at SBA/S level very quickly prior to OT session.         Balance  Sitting Balance: Supervision  Standing Balance: Stand by assistance(SBA standing at sink to ensure zero LOB.)  Standing Balance  Time: ~10 minutes  Activity: Functional mobility with RW, grooming  Comment: w/SW and no LOB, verbal cues for proper use of SW pt demo P carryover. Unstable throughout however no LOB. Functional Mobility  Functional - Mobility Device: Standard Walker  Activity: To/from bathroom; Other(Functional mobility house hold distances.)  Assist Level: Contact guard assistance  Functional Mobility Comments: verbal cues for proper use of SW- P carryover. NO LOB, however unstable with P safety awareness                             Cognition  Overall Cognitive Status: Exceptions  Arousal/Alertness: Appropriate responses to stimuli  Following Commands: Follows multistep commands with repitition  Attention Span: Attends with cues to redirect  Safety Judgement: Decreased awareness of need for assistance;Decreased awareness of need for safety  Problem Solving: Assistance required to identify errors made;Assistance required to correct errors made  Insights: Decreased awareness of deficits  Initiation: Requires cues for some  Sequencing: Does not require cues  Cognition Comment: Pt. fixated on going out side to get \"fresh air\", requesting to complete OT session soon, so she could go outside.                                          Plan   Plan  Times per week: 3-4 x/wk  Current Treatment Recommendations: Balance Training, Functional Mobility Training, Endurance Training, Cognitive Reorientation, Safety Education & Training, Patient/Caregiver Education & Training, Equipment Evaluation, Education, & procurement, Self-Care / ADL       Goals  Short term goals  Time Frame for Short term goals: By discharge, pt will:  Short term goal 1: Demo Mod I with use of LRD for functional transfers and functional mobility  Short term goal 2: Demo I with setup for UB ADLs and grooming tasks  Short term goal 3: Demo SBA with setup for LB ADLs and toileting tasks  Short term goal 4: Demo 8+ minutes dynamic standing task to increase independence with ADL/IADLs  Short term goal 5: Demo good safety awareness throughout therapy session with 0 VCs       Therapy Time   Individual Concurrent Group Co-treatment   Time In 0946         Time Out 1005         Minutes 19         Timed Code Treatment Minutes: Judith [address]., TIM/L

## 2021-05-13 NOTE — PROGRESS NOTES
OBS/CDU   RESIDENT NOTE      Patients PCP is: No primary care provider on file. SUBJECTIVE      No acute overnight events. Patient has been ambulating, tolerating a general diet and passing urine. Discussed with patient regarding placement. Patient has no further concerns or questions or current. PHYSICAL EXAM      General: NAD, AO X 3  Heent: EMOI, PERRL  Neck: SUPPLE, NO JVD  Cardiovascular: RRR, S1S2  Pulmonary: CTAB, NO SOB  Abdomen: SOFT, NTTP, ND, +BS  Extremities: +2/4 PULSES DISTAL, NO SWELLING  Neuro / Psych: NO NUMBNESS OR TINGLING, MENTATION AT BASELINE    PERTINENT TEST /EXAMS      I have reviewed all available laboratory results.     MEDICATIONS CURRENT   oxyCODONE (ROXICODONE) immediate release tablet 5 mg, Q4H PRN    Or  oxyCODONE (ROXICODONE) immediate release tablet 10 mg, Q4H PRN  magnesium hydroxide (MILK OF MAGNESIA) 400 MG/5ML suspension 30 mL, Daily PRN  docusate sodium (COLACE) capsule 100 mg, BID  morphine injection 4 mg, Once  ondansetron (ZOFRAN) injection 4 mg, Once  therapeutic multivitamin-minerals 1 tablet, Daily  folic acid (FOLVITE) tablet 1 mg, Daily  thiamine mononitrate tablet 100 mg, Daily  vitamin B-12 (CYANOCOBALAMIN) tablet 250 mcg, Daily  ipratropium-albuterol (DUONEB) nebulizer solution 3 mL, Q4H PRN  butalbital-acetaminophen-caffeine (FIORICET, ESGIC) per tablet 1 tablet, Q12H  famotidine (PEPCID) tablet 20 mg, BID  budesonide-formoterol (SYMBICORT) 80-4.5 MCG/ACT inhaler 2 puff, BID  mirtazapine (REMERON) tablet 30 mg, Nightly  lisinopril (PRINIVIL;ZESTRIL) tablet 10 mg, Daily  gabapentin (NEURONTIN) capsule 300 mg, TID  ferrous sulfate (FE TABS 325) EC tablet 325 mg, Daily  clopidogrel (PLAVIX) tablet 75 mg, Daily  carvedilol (COREG) tablet 6.25 mg, BID WC  atorvastatin (LIPITOR) tablet 40 mg, Nightly  amitriptyline (ELAVIL) tablet 10 mg, Nightly  albuterol (PROVENTIL) nebulizer solution 2.5 mg, Q4H PRN  apixaban (ELIQUIS) tablet 5 mg, BID  sodium chloride flush 0.9 % injection 5-40 mL, 2 times per day  sodium chloride flush 0.9 % injection 5-40 mL, PRN  0.9 % sodium chloride infusion, PRN  acetaminophen (TYLENOL) tablet 650 mg, Q4H PRN  ondansetron (ZOFRAN-ODT) disintegrating tablet 4 mg, Q8H PRN  fentaNYL (SUBLIMAZE) injection 50 mcg, Q4H PRN        All medication charted and reviewed. CONSULTS      IP CONSULT TO IV TEAM  IP CONSULT TO PSYCHIATRY    ASSESSMENT/PLAN       Emi Daniels is a 74 y.o. female who presents with history of pancreatic head mass    1. Abdominal pain   - Labs grossly unremarkable   - Plan for pancreatic mass resection at [facility]   - Pain management   - Placement planning    · Continue home medications and pain control  · Monitor vitals, labs, and imaging  · DISPO: pending consults and clinical improvement    --  [address]  Emergency Medicine Resident Physician     This dictation was generated by voice recognition computer software. Although all attempts are made to edit the dictation for accuracy, there may be errors in the transcription that are not intended.

## 2021-05-13 NOTE — PROGRESS NOTES
CLINICAL PHARMACY NOTE: MEDS TO [address] Select Patient?: No  Total # of Prescriptions Filled: 17   The following medications were delivered to the patient:  · Butalbital-APAP-caffeine 50/325/40mg  · Eliquis 5mg  · Docusate 100mg  · Thiamine 100mg  · Ferrous Sulfate 325mg  · Gabapentin 300mg  · Amitriptylene 10mg  · Mirtazapine 30mg  · Atorvastatin 40mg  · Lisinopril 10mg  · Carvedilol 6.25mg  · Clopidogrel 75mg  · Famotidine 20mg  · Ondansetron 4mg ODT  · Clearlax 17gm  · Acetaminophen 500mg  · Oxycodone 5mg  Total # of Interventions Completed: 0  Time Spent (min): 30    Additional Documentation:

## 2021-05-13 NOTE — CARE COORDINATION
[facility] notified writer that the patient's SNF stay has been denied by insurance. Patient and Dr Valentino Blum are notified. Patient is informed that she should call family members to determine where she will go at discharge. Patient walked off the unit to smoke. Patient ambulated by pushing a WC.

## 2021-05-19 ENCOUNTER — APPOINTMENT (OUTPATIENT)
Dept: CT IMAGING | Age: 66
End: 2021-05-19
Payer: MEDICARE

## 2021-05-19 ENCOUNTER — HOSPITAL ENCOUNTER (EMERGENCY)
Age: 66
Discharge: HOME | End: 2021-05-19
Attending: EMERGENCY MEDICINE
Payer: MEDICARE

## 2021-05-19 VITALS
SYSTOLIC BLOOD PRESSURE: 111 MMHG | RESPIRATION RATE: 16 BRPM | OXYGEN SATURATION: 97 % | WEIGHT: 80 LBS | HEART RATE: 99 BPM | HEIGHT: 59 IN | DIASTOLIC BLOOD PRESSURE: 77 MMHG | TEMPERATURE: 98.4 F | BODY MASS INDEX: 16.13 KG/M2

## 2021-05-19 DIAGNOSIS — R10.10 PAIN OF UPPER ABDOMEN: Primary | ICD-10-CM

## 2021-05-19 LAB
ABSOLUTE EOS #: 0.09 K/UL (ref 0–0.44)
ABSOLUTE IMMATURE GRANULOCYTE: 0.05 K/UL (ref 0–0.3)
ABSOLUTE LYMPH #: 2.02 K/UL (ref 1.1–3.7)
ABSOLUTE MONO #: 0.57 K/UL (ref 0.1–1.2)
ALBUMIN SERPL-MCNC: 4.1 G/DL (ref 3.5–5.2)
ALBUMIN/GLOBULIN RATIO: 0.9 (ref 1–2.5)
ALP BLD-CCNC: 158 U/L (ref 35–104)
ALT SERPL-CCNC: 63 U/L (ref 5–33)
ANION GAP SERPL CALCULATED.3IONS-SCNC: 14 MMOL/L (ref 9–17)
AST SERPL-CCNC: 93 U/L
BASOPHILS # BLD: 0 % (ref 0–2)
BASOPHILS ABSOLUTE: <0.03 K/UL (ref 0–0.2)
BILIRUB SERPL-MCNC: 0.36 MG/DL (ref 0.3–1.2)
BILIRUBIN URINE: NEGATIVE
BNP INTERPRETATION: NORMAL
BUN BLDV-MCNC: 11 MG/DL (ref 8–23)
BUN/CREAT BLD: ABNORMAL (ref 9–20)
CALCIUM SERPL-MCNC: 9.4 MG/DL (ref 8.6–10.4)
CHLORIDE BLD-SCNC: 101 MMOL/L (ref 98–107)
CO2: 20 MMOL/L (ref 20–31)
COLOR: YELLOW
COMMENT UA: ABNORMAL
CREAT SERPL-MCNC: 0.76 MG/DL (ref 0.5–0.9)
DIFFERENTIAL TYPE: ABNORMAL
EOSINOPHILS RELATIVE PERCENT: 2 % (ref 1–4)
GFR AFRICAN AMERICAN: >60 ML/MIN
GFR NON-AFRICAN AMERICAN: >60 ML/MIN
GFR SERPL CREATININE-BSD FRML MDRD: ABNORMAL ML/MIN/{1.73_M2}
GFR SERPL CREATININE-BSD FRML MDRD: ABNORMAL ML/MIN/{1.73_M2}
GLUCOSE BLD-MCNC: 71 MG/DL (ref 70–99)
GLUCOSE URINE: NEGATIVE
HCT VFR BLD CALC: 40.8 % (ref 36.3–47.1)
HEMOGLOBIN: 12.5 G/DL (ref 11.9–15.1)
IMMATURE GRANULOCYTES: 1 %
INR BLD: 0.9
KETONES, URINE: NEGATIVE
LACTIC ACID, WHOLE BLOOD: 1.1 MMOL/L (ref 0.7–2.1)
LACTIC ACID: NORMAL MMOL/L
LEUKOCYTE ESTERASE, URINE: NEGATIVE
LIPASE: 90 U/L (ref 13–60)
LYMPHOCYTES # BLD: 33 % (ref 24–43)
MCH RBC QN AUTO: 32.4 PG (ref 25.2–33.5)
MCHC RBC AUTO-ENTMCNC: 30.6 G/DL (ref 28.4–34.8)
MCV RBC AUTO: 105.7 FL (ref 82.6–102.9)
MONOCYTES # BLD: 9 % (ref 3–12)
NITRITE, URINE: NEGATIVE
NRBC AUTOMATED: 0 PER 100 WBC
PARTIAL THROMBOPLASTIN TIME: 23.1 SEC (ref 20.5–30.5)
PDW BLD-RTO: 13.7 % (ref 11.8–14.4)
PH UA: 7 (ref 5–8)
PLATELET # BLD: 533 K/UL (ref 138–453)
PLATELET ESTIMATE: ABNORMAL
PMV BLD AUTO: 9.4 FL (ref 8.1–13.5)
POTASSIUM SERPL-SCNC: 4.6 MMOL/L (ref 3.7–5.3)
PRO-BNP: 199 PG/ML
PROTEIN UA: NEGATIVE
PROTHROMBIN TIME: 10.1 SEC (ref 9.1–12.3)
RBC # BLD: 3.86 M/UL (ref 3.95–5.11)
RBC # BLD: ABNORMAL 10*6/UL
SEG NEUTROPHILS: 55 % (ref 36–65)
SEGMENTED NEUTROPHILS ABSOLUTE COUNT: 3.3 K/UL (ref 1.5–8.1)
SODIUM BLD-SCNC: 135 MMOL/L (ref 135–144)
SPECIFIC GRAVITY UA: 1.04 (ref 1–1.03)
TOTAL PROTEIN: 8.6 G/DL (ref 6.4–8.3)
TROPONIN INTERP: NORMAL
TROPONIN T: NORMAL NG/ML
TROPONIN, HIGH SENSITIVITY: 12 NG/L (ref 0–14)
TURBIDITY: CLEAR
URINE HGB: NEGATIVE
UROBILINOGEN, URINE: NORMAL
WBC # BLD: 6.1 K/UL (ref 3.5–11.3)
WBC # BLD: ABNORMAL 10*3/UL

## 2021-05-19 PROCEDURE — 6360000002 HC RX W HCPCS: Performed by: STUDENT IN AN ORGANIZED HEALTH CARE EDUCATION/TRAINING PROGRAM

## 2021-05-19 PROCEDURE — 85025 COMPLETE CBC W/AUTO DIFF WBC: CPT

## 2021-05-19 PROCEDURE — 93005 ELECTROCARDIOGRAM TRACING: CPT | Performed by: STUDENT IN AN ORGANIZED HEALTH CARE EDUCATION/TRAINING PROGRAM

## 2021-05-19 PROCEDURE — 70450 CT HEAD/BRAIN W/O DYE: CPT

## 2021-05-19 PROCEDURE — 80053 COMPREHEN METABOLIC PANEL: CPT

## 2021-05-19 PROCEDURE — 74177 CT ABD & PELVIS W/CONTRAST: CPT

## 2021-05-19 PROCEDURE — 6360000004 HC RX CONTRAST MEDICATION: Performed by: STUDENT IN AN ORGANIZED HEALTH CARE EDUCATION/TRAINING PROGRAM

## 2021-05-19 PROCEDURE — 83605 ASSAY OF LACTIC ACID: CPT

## 2021-05-19 PROCEDURE — 83880 ASSAY OF NATRIURETIC PEPTIDE: CPT

## 2021-05-19 PROCEDURE — 85610 PROTHROMBIN TIME: CPT

## 2021-05-19 PROCEDURE — 96374 THER/PROPH/DIAG INJ IV PUSH: CPT

## 2021-05-19 PROCEDURE — 71260 CT THORAX DX C+: CPT

## 2021-05-19 PROCEDURE — 81003 URINALYSIS AUTO W/O SCOPE: CPT

## 2021-05-19 PROCEDURE — 70498 CT ANGIOGRAPHY NECK: CPT

## 2021-05-19 PROCEDURE — 2580000003 HC RX 258: Performed by: STUDENT IN AN ORGANIZED HEALTH CARE EDUCATION/TRAINING PROGRAM

## 2021-05-19 PROCEDURE — 99284 EMERGENCY DEPT VISIT MOD MDM: CPT

## 2021-05-19 PROCEDURE — 84484 ASSAY OF TROPONIN QUANT: CPT

## 2021-05-19 PROCEDURE — 83690 ASSAY OF LIPASE: CPT

## 2021-05-19 PROCEDURE — 85730 THROMBOPLASTIN TIME PARTIAL: CPT

## 2021-05-19 RX ORDER — 0.9 % SODIUM CHLORIDE 0.9 %
1000 INTRAVENOUS SOLUTION INTRAVENOUS ONCE
Status: COMPLETED | OUTPATIENT
Start: 2021-05-19 | End: 2021-05-19

## 2021-05-19 RX ORDER — MORPHINE SULFATE 4 MG/ML
4 INJECTION, SOLUTION INTRAMUSCULAR; INTRAVENOUS ONCE
Status: COMPLETED | OUTPATIENT
Start: 2021-05-19 | End: 2021-05-19

## 2021-05-19 RX ADMIN — MORPHINE SULFATE 4 MG: 4 INJECTION INTRAVENOUS at 20:49

## 2021-05-19 RX ADMIN — SODIUM CHLORIDE 1000 ML: 9 INJECTION, SOLUTION INTRAVENOUS at 20:49

## 2021-05-19 RX ADMIN — IOPAMIDOL 165 ML: 755 INJECTION, SOLUTION INTRAVENOUS at 21:42

## 2021-05-19 ASSESSMENT — PAIN SCALES - GENERAL
PAINLEVEL_OUTOF10: 8
PAINLEVEL_OUTOF10: 8

## 2021-05-19 ASSESSMENT — PAIN DESCRIPTION - DESCRIPTORS: DESCRIPTORS: ACHING

## 2021-05-19 ASSESSMENT — PAIN DESCRIPTION - PAIN TYPE: TYPE: ACUTE PAIN

## 2021-05-19 NOTE — ED PROVIDER NOTES
[facility]  ED  Emergency Department Encounter  EmergencyMedicine Resident     Pt Name:Emi Kang  MRN: [id number]  Birthdate 1955  Date of evaluation: 5/19/21  PCP:  No primary care provider on file. CHIEF COMPLAINT       Chief Complaint   Patient presents with    Hip Pain    Abdominal Pain       HISTORY OF PRESENT ILLNESS  (Location/Symptom, Timing/Onset, Context/Setting, Quality, Duration, Modifying Factors, Severity.)      Emi Daniels is a 74 y.o. female who presents with abdominal pain. Patient presents with 2 days of progressive worsening epigastric abdominal pain, sharp, radiating to the right, severe, does not know what makes it better or worse, associated with nausea, vomiting. Patient also reports chest pain, anterior, nonradiating, sharp, does not know what makes it better or worse, associated with shortness of breath. Patient also reports right arm numbness and tingling, right-sided facial numbness and tingling, which started over 1 day ago. Patient denies fevers, chills, cough, congestion, rhinorrhea, lightheadedness, headaches, diaphoresis, dysuria, vaginal bleeding, diarrhea, constipation, lower extremity swelling or pain. Patient reports a history of pancreatic cancer, has not followed up with her PCP or oncologist.  Patient has significant cardiac history, history of CVA, substance abuse, homelessness.     PAST MEDICAL / SURGICAL / SOCIAL / FAMILY HISTORY      has a past medical history of Appendicitis, Cerebral artery occlusion with cerebral infarction ([address] CHF (congestive heart failure) (HCC), Chronic respiratory failure with hypoxia and hypercapnia (HCC), Chronic rhinitis, Cigarette smoker, COPD (chronic obstructive pulmonary disease) ([address] Depression, Dysphagia, Essential hypertension, GERD (gastroesophageal reflux disease), Heart failure, diastolic, with acute decompensation ([address] Hepatitis C, chronic ([address] History of smoking at least 1 pack per day for at least 30 years, Hyperlipidemia, Hypertension, Migraine, Moderate COPD (chronic obstructive pulmonary disease) (HCC), Multiple transfusions, Neurogenic dysphagia, Osteoarthritis, Pneumonia, Scoliosis, and Substance abuse ([facility] has a past surgical history that includes  section; LEEP (); Upper gastrointestinal endoscopy (); Gastric bypass surgery; Appendectomy (2017); laparoscopic appendectomy (N/A, 3/27/2017); Upper gastrointestinal endoscopy (2018); Upper gastrointestinal endoscopy (N/A, 2018); pr colonoscopy w/biopsy single/multiple (N/A, 11/10/2018); pr egd transoral biopsy single/multiple (N/A, 2018); hip surgery (Right, 2019); Total hip arthroplasty (Right, 2019); Medication Injection (Left, 3/25/2021); and Upper gastrointestinal endoscopy (N/A, 2021).       Social History     Socioeconomic History    Marital status: Single     Spouse name: Not on file    Number of children: Not on file    Years of education: Not on file    Highest education level: Not on file   Occupational History    Not on file   Tobacco Use    Smoking status: Current Some Day Smoker     Packs/day: 0.50     Years: 40.00     Pack years: 20.00     Types: Cigarettes     Start date:      Last attempt to quit: 2018     Years since quittin.5    Smokeless tobacco: Never Used   Vaping Use    Vaping Use: Never used   Substance and Sexual Activity    Alcohol use: No     Alcohol/week: 0.0 standard drinks    Drug use: No     Types: IV, Cocaine     Comment: past history    Sexual activity: Not on file   Other Topics Concern    Not on file   Social History Narrative    Not on file     Social Determinants of Health     Financial Resource Strain:     Difficulty of Paying Living Expenses:    Food Insecurity:     Worried About Running Out of Food in the Last Year:     Ran Out of Food in the Last Year:    Transportation Needs:     Lack of Transportation (Medical):    Tommie Patton Lack of Transportation (Non-Medical):    Physical Activity:     Days of Exercise per Week:     Minutes of Exercise per Session:    Stress:     Feeling of Stress :    Social Connections:     Frequency of Communication with Friends and Family:     Frequency of Social Gatherings with Friends and Family:     Attends Church Services:     Active Member of Clubs or Organizations:     Attends Club or Organization Meetings:     Marital Status:    Intimate Partner Violence:     Fear of Current or Ex-Partner:     Emotionally Abused:     Physically Abused:     Sexually Abused:        Family History   Problem Relation Age of Onset    Breast Cancer Mother     Heart Disease Father        Allergies:  Aspirin and Lasha Battles [cephalexin]    Home Medications:  Prior to Admission medications    Medication Sig Start Date End Date Taking? Authorizing Provider   butalbital-acetaminophen-caffeine (FIORICET, ESGIC) -76 MG per tablet Take 1 tablet by mouth every 12 hours for 14 days 5/13/21 5/27/21  E Bryant Mullen MD   apixaban (ELIQUIS) 5 MG TABS tablet Take 1 tablet by mouth 2 times daily for 14 days 5/13/21 5/27/21  E Bryant Mullen MD   docusate sodium (COLACE, DULCOLAX) 100 MG CAPS Take 100 mg by mouth 2 times daily for 14 days 5/13/21 5/27/21  E Bryant Mullen MD   thiamine mononitrate (THIAMINE) 100 MG tablet Take 1 tablet by mouth daily for 14 days 5/14/21 5/28/21  E Bryant Mullen MD   ferrous sulfate (FE TABS 325) 325 (65 Fe) MG EC tablet Take 1 tablet by mouth daily 5/14/21   E Bryant Mullen MD   gabapentin (NEURONTIN) 300 MG capsule Take 1 capsule by mouth 2 times daily for 14 days.  Intended supply: 90 days 5/13/21 5/27/21  E Bryant Mullen MD   amitriptyline (ELAVIL) 10 MG tablet Take 1 tablet by mouth nightly for 14 days 5/13/21 5/27/21  E Bryant Mullen MD   mirtazapine (REMERON) 30 MG tablet Take 1 tablet by mouth nightly for 14 days 5/13/21 5/27/21  E Bryant Mullen MD   atorvastatin (LIPITOR) 40 MG tablet Take 1 tablet by mouth daily for 14 days 5/13/21 5/27/21  Magalie Breaux MD   lisinopril (PRINIVIL;ZESTRIL) 10 MG tablet Take 1 tablet by mouth daily for 14 days 5/13/21 5/27/21  ISIDORO Brumfield MD   carvedilol (COREG) 6.25 MG tablet Take 1 tablet by mouth 2 times daily for 14 days 5/13/21 5/27/21  ISIDORO Brumfield MD   clopidogrel (PLAVIX) 75 MG tablet Take 1 tablet by mouth daily 5/13/21   ISIDORO Brumfield MD   famotidine (PEPCID) 20 MG tablet Take 1 tablet by mouth 2 times daily for 14 days 5/13/21 5/27/21  E Milka Brumfield MD   ondansetron (ZOFRAN-ODT) 4 MG disintegrating tablet Take 1 tablet by mouth every 8 hours as needed for Nausea or Vomiting 5/7/21   Tara Leung, DO   polyethylene glycol (MIRALAX) 17 g packet Take 17 g by mouth daily as needed for Constipation 5/7/21 6/6/21  Tara Leung, DO   acetaminophen (TYLENOL) 500 MG tablet Take 2 tablets by mouth every 6 hours as needed for Pain 5/7/21   Tara Leung, DO   famotidine (PEPCID) 20 MG tablet Take 1 tablet by mouth 2 times daily 4/6/21   Ramiro Phoenix, DO   mometasone-formoterol [facility]) 100-5 MCG/ACT inhaler Inhale 2 puffs into the lungs 2 times daily 4/6/21   Ramiro Phoenix, DO   mirtazapine (REMERON) 30 MG tablet Take 1 tablet by mouth nightly 4/6/21   Ramiro Phoenix, DO   lisinopril (PRINIVIL;ZESTRIL) 10 MG tablet Take 1 tablet by mouth daily 4/6/21   Ramiro Phoenix, DO   gabapentin (NEURONTIN) 300 MG capsule Take 1 capsule by mouth 3 times daily for 30 days.  4/6/21 5/12/21  Ramiro Phoenix, DO   ferrous sulfate (MELISSA-HAO) 325 (65 Fe) MG tablet Take 1 tablet by mouth daily 4/6/21   Ramiro Phoenix, DO   clopidogrel (PLAVIX) 75 MG tablet Take 1 tablet by mouth daily 4/6/21   Ramiro Phoenix DO   carvedilol (COREG) 6.25 MG tablet Take 1 tablet by mouth 2 times daily (with meals) 4/6/21   Ramiro Phoenix DO   atorvastatin (LIPITOR) 40 MG tablet Take 1 tablet by mouth nightly 4/6/21   Ramiro Phoenix DO   apixaban (ELIQUIS) 5 MG TABS tablet Take 1 tablet by mouth 2 times daily 4/6/21   Ramiro Phoenix DO   amitriptyline (ELAVIL) 10 MG tablet Take 1 tablet by mouth nightly 4/6/21   Yelitza Bunchter, DO   albuterol sulfate HFA (PROVENTIL HFA) 108 (90 Base) MCG/ACT inhaler Inhale 1-2 puffs into the lungs once for 1 dose 4/6/21 4/6/21  Yelitza Sherwood, DO   Elastic Bandages & Supports (LUMBAR BACK BRACE/SUPPORT PAD) MISC 1 Units by Does not apply route daily 2/10/21 2/10/22  Edna Pompa MD   amLODIPine (NORVASC) 10 MG tablet Take 1 tablet by mouth daily 2/9/21 2/9/21  Jeramy Glover MD   Sennosides (SENNA) 8.6 MG CAPS 8.6 capsules 8/2/20   Historical Provider, MD   Misc. Devices [facility]) MISC 1 Device by Does not apply route once for 1 dose 8/5/19 8/5/19  Genoveva Dudley PA-C   ipratropium-albuterol (DUONEB) 0.5-2.5 (3) MG/3ML SOLN nebulizer solution Inhale 3 mLs into the lungs every 4 hours as needed for Shortness of Breath 1/11/19   Burnadette Shone, MD   folic acid (FOLVITE) 1 MG tablet Take 1 tablet by mouth daily 10/13/18   Yessy Oleary MD   vitamin B-1 100 MG tablet Take 1 tablet by mouth daily 10/13/18   eYssy Oleary MD   vitamin B-12 250 MCG tablet Take 1 tablet by mouth daily 10/13/18   Yessy Oleary MD   Multiple Vitamins-Minerals (THERAPEUTIC MULTIVITAMIN-MINERALS) tablet Take 1 tablet by mouth daily 8/12/18   Marilee Menjivar MD       REVIEW OF SYSTEMS    (2-9 systems for level 4, 10 or more for level 5)      Review of Systems   Constitutional: Negative for chills, diaphoresis, fatigue and fever. HENT: Negative for congestion, rhinorrhea and sore throat. Eyes: Negative for visual disturbance. Respiratory: Positive for shortness of breath. Negative for chest tightness and wheezing. Cardiovascular: Positive for chest pain. Negative for palpitations and leg swelling. Gastrointestinal: Positive for abdominal pain, nausea and vomiting. Negative for blood in stool, constipation and diarrhea. Genitourinary: Negative for dysuria, frequency, hematuria and urgency. Musculoskeletal: Negative for neck pain and neck stiffness.    Skin: Negative for pallor and rash. Neurological: Positive for numbness. Negative for dizziness, seizures, syncope, facial asymmetry, speech difficulty, weakness, light-headedness and headaches. Psychiatric/Behavioral: Negative for confusion. PHYSICAL EXAM   (up to 7 for level 4, 8 or more for level 5)      INITIAL VITALS:   /77   Pulse 99   Temp 98.4 °F (36.9 °C)   Resp 16   Ht 4' 11\" (1.499 m)   Wt 80 lb (36.3 kg)   LMP 03/18/2015   SpO2 97%   BMI 16.16 kg/m²     Physical Exam  Constitutional:       General: She is not in acute distress. Appearance: She is well-developed. She is not ill-appearing, toxic-appearing or diaphoretic. Comments: Patient is alert oriented, openly communicative, no acute distress, nontoxic-appearing, speaking in full sentences, does not appear to be ill or in a significant pain. HENT:      Head: Normocephalic and atraumatic. Right Ear: External ear normal.      Left Ear: External ear normal.      Nose: Nose normal. No congestion or rhinorrhea. Mouth/Throat:      Mouth: Mucous membranes are moist.      Pharynx: Oropharynx is clear. No oropharyngeal exudate or posterior oropharyngeal erythema. Eyes:      General:         Right eye: No discharge. Left eye: No discharge. Extraocular Movements: Extraocular movements intact. Pupils: Pupils are equal, round, and reactive to light. Neck:      Vascular: No JVD. Trachea: No tracheal deviation. Cardiovascular:      Rate and Rhythm: Normal rate and regular rhythm. Pulses: Normal pulses. Heart sounds: Normal heart sounds. No murmur heard. No friction rub. No gallop. Pulmonary:      Effort: Pulmonary effort is normal. No respiratory distress. Breath sounds: Normal breath sounds. No stridor. No wheezing, rhonchi or rales. Chest:      Chest wall: No tenderness. Abdominal:      General: There is no distension. Palpations: Abdomen is soft. There is no mass. Tenderness: There is abdominal tenderness. There is no right CVA tenderness, left CVA tenderness or guarding. Comments: Epigastric generalized abdominal tenderness, negative Tompkins sign, no overlying skin changes, no CVA tenderness bilaterally. Musculoskeletal:         General: Normal range of motion. Cervical back: Normal range of motion and neck supple. No rigidity or tenderness. Right lower leg: No edema. Left lower leg: No edema. Skin:     General: Skin is warm. Capillary Refill: Capillary refill takes less than 2 seconds. Findings: No rash. Neurological:      General: No focal deficit present. Mental Status: She is alert and oriented to person, place, and time. Cranial Nerves: No cranial nerve deficit. Sensory: No sensory deficit. Motor: No weakness. Coordination: Coordination normal.      Gait: Gait normal.      Comments: Patient reports numbness, but patient does have sensation to light touch intact in all extremities, strength 5/5 in all extremities, cranial nerves intact, finger-nose-finger intact, patient able to walk without difficulty.    Psychiatric:         Mood and Affect: Mood normal.         Behavior: Behavior normal.         DIFFERENTIAL  DIAGNOSIS     PLAN (LABS / IMAGING / EKG):  Orders Placed This Encounter   Procedures    CT Head WO Contrast    CTA HEAD NECK W CONTRAST    CT ABDOMEN PELVIS W IV CONTRAST Additional Contrast? None    CT CHEST PULMONARY EMBOLISM W CONTRAST    CBC Auto Differential    Comprehensive Metabolic Panel w/ Reflex to MG    Troponin    Lipase    Brain Natriuretic Peptide    Protime-INR    APTT    Urinalysis Reflex to Culture    Lactic Acid, Plasma    EKG 12 Lead       MEDICATIONS ORDERED:  Orders Placed This Encounter   Medications    0.9 % sodium chloride bolus    morphine injection 4 mg    iopamidol (ISOVUE-370) 76 % injection 165 mL       DDX:     DIAGNOSTIC RESULTS / EMERGENCY DEPARTMENT COURSE / MDM   LAB RESULTS:  Results for orders placed or performed during the hospital encounter of 05/19/21   CBC Auto Differential   Result Value Ref Range    WBC 6.1 3.5 - 11.3 k/uL    RBC 3.86 (L) 3.95 - 5.11 m/uL    Hemoglobin 12.5 11.9 - 15.1 g/dL    Hematocrit 40.8 36.3 - 47.1 %    .7 (H) 82.6 - 102.9 fL    MCH 32.4 25.2 - 33.5 pg    MCHC 30.6 28.4 - 34.8 g/dL    RDW 13.7 11.8 - 14.4 %    Platelets 548 (H) 485 - 453 k/uL    MPV 9.4 8.1 - 13.5 fL    NRBC Automated 0.0 0.0 per 100 WBC    Differential Type NOT REPORTED     Seg Neutrophils 55 36 - 65 %    Lymphocytes 33 24 - 43 %    Monocytes 9 3 - 12 %    Eosinophils % 2 1 - 4 %    Basophils 0 0 - 2 %    Immature Granulocytes 1 (H) 0 %    Segs Absolute 3.30 1.50 - 8.10 k/uL    Absolute Lymph # 2.02 1.10 - 3.70 k/uL    Absolute Mono # 0.57 0.10 - 1.20 k/uL    Absolute Eos # 0.09 0.00 - 0.44 k/uL    Basophils Absolute <0.03 0.00 - 0.20 k/uL    Absolute Immature Granulocyte 0.05 0.00 - 0.30 k/uL    WBC Morphology NOT REPORTED     RBC Morphology MACROCYTOSIS PRESENT     Platelet Estimate NOT REPORTED    Comprehensive Metabolic Panel w/ Reflex to MG   Result Value Ref Range    Glucose 71 70 - 99 mg/dL    BUN 11 8 - 23 mg/dL    CREATININE 0.76 0.50 - 0.90 mg/dL    Bun/Cre Ratio NOT REPORTED 9 - 20    Calcium 9.4 8.6 - 10.4 mg/dL    Sodium 135 135 - 144 mmol/L    Potassium 4.6 3.7 - 5.3 mmol/L    Chloride 101 98 - 107 mmol/L    CO2 20 20 - 31 mmol/L    Anion Gap 14 9 - 17 mmol/L    Alkaline Phosphatase 158 (H) 35 - 104 U/L    ALT 63 (H) 5 - 33 U/L    AST 93 (H) <32 U/L    Total Bilirubin 0.36 0.3 - 1.2 mg/dL    Total Protein 8.6 (H) 6.4 - 8.3 g/dL    Albumin 4.1 3.5 - 5.2 g/dL    Albumin/Globulin Ratio 0.9 (L) 1.0 - 2.5    GFR Non-African American >60 >60 mL/min    GFR African American >60 >60 mL/min    GFR Comment          GFR Staging NOT REPORTED    Troponin   Result Value Ref Range    Troponin, High Sensitivity 12 0 - 14 ng/L    Troponin T NOT REPORTED <0.03 ng/mL    Troponin Interp NOT REPORTED    Lipase   Result Value Ref Range    Lipase 90 (H) 13 - 60 U/L   Brain Natriuretic Peptide   Result Value Ref Range    Pro- <300 pg/mL    BNP Interpretation Pro-BNP Reference Range:    Protime-INR   Result Value Ref Range    Protime 10.1 9.1 - 12.3 sec    INR 0.9    APTT   Result Value Ref Range    PTT 23.1 20.5 - 30.5 sec   Urinalysis Reflex to Culture    Specimen: Urine, clean catch   Result Value Ref Range    Color, UA YELLOW YELLOW    Turbidity UA CLEAR CLEAR    Glucose, Ur NEGATIVE NEGATIVE    Bilirubin Urine NEGATIVE NEGATIVE    Ketones, Urine NEGATIVE NEGATIVE    Specific Gravity, UA 1.036 (H) 1.005 - 1.030    Urine Hgb NEGATIVE NEGATIVE    pH, UA 7.0 5.0 - 8.0    Protein, UA NEGATIVE NEGATIVE    Urobilinogen, Urine Normal Normal    Nitrite, Urine NEGATIVE NEGATIVE    Leukocyte Esterase, Urine NEGATIVE NEGATIVE    Urinalysis Comments       Microscopic exam not performed based on chemical results unless requested in original order. Lactic Acid, Plasma   Result Value Ref Range    Lactic Acid NOT REPORTED mmol/L    Lactic Acid, Whole Blood 1.1 0.7 - 2.1 mmol/L       IMPRESSION: 58-year-old female with history of pancreatic cancer presenting with abdominal pain, nausea, vomiting. Patient also reports having some chest pain for the past couple weeks, associate with shortness of breath. Will obtain CT abdomen pelvis, abdominal labs to evaluate for abdominal pain. Will obtain EKG, troponin to evaluate for chest pain and shortness of breath. Patient has no focal neurologic deficits on exam, will obtain CT to evaluate for PE or lung masses.     RADIOLOGY:  CT Head WO Contrast    Result Date: 5/19/2021  EXAMINATION: CT OF THE HEAD WITHOUT CONTRAST  5/19/2021 6:32 pm TECHNIQUE: CT of the head was performed without the administration of intravenous contrast. Dose modulation, iterative reconstruction, and/or weight based adjustment of the mA/kV was utilized to reduce the radiation dose to as low as reasonably achievable. COMPARISON: None. HISTORY: ORDERING SYSTEM PROVIDED HISTORY: Right numbness TECHNOLOGIST PROVIDED HISTORY: Right numbness Decision Support Exception - unselect if not a suspected or confirmed emergency medical condition->Emergency Medical Condition (MA) Reason for Exam: Right numbness FINDINGS: BRAIN/VENTRICLES: The cerebral hemispheres, brainstem, and cerebellum have a normal appearance for the patient's age. The falx is midline. The ventricles and peripheral sulci are mildly dilated. There is decreased attenuation in the periventricular white matter. There is no sign of a space occupying lesion, infarction, or hemorrhage. Orbits: Portion of the orbits demonstrate no acute abnormality. SINUSES: Soft tissue opacification in the maxillary sinuses. The remaining imaged portions of the paranasal sinuses are clear. The mastoids and the middle ear chambers are clear. Cerumen in the external auditory canals. SOFT TISSUES/SKULL:  No acute abnormality of the visualized skull or soft tissues. Vascular calcifications are seen compatible with atherosclerotic disease. Mild central and cortical cerebral atrophy. Mild chronic deep white matter ischemic changes No acute intracranial abnormalities are noted. CT ABDOMEN PELVIS W IV CONTRAST Additional Contrast? None    Result Date: 5/19/2021  EXAMINATION: CT OF THE ABDOMEN AND PELVIS WITH CONTRAST 5/19/2021 9:25 pm TECHNIQUE: CT of the abdomen and pelvis was performed with the administration of intravenous contrast. Multiplanar reformatted images are provided for review. Dose modulation, iterative reconstruction, and/or weight based adjustment of the mA/kV was utilized to reduce the radiation dose to as low as reasonably achievable.  COMPARISON: 05/06/2021 HISTORY: ORDERING SYSTEM PROVIDED HISTORY: Hx pancreatic cancer, new right abdominal pain TECHNOLOGIST PROVIDED HISTORY: Hx pancreatic cancer, new right abdominal pain Decision Support Exception - unselect if not a suspected or confirmed emergency medical condition->Emergency Medical Condition (MA) Reason for Exam: Hx pancreatic cancer, new right abdominal pain FINDINGS: Lower Chest: There is subsegmental atelectasis in both lung bases. Organs: The liver is diffusely hypoattenuating. No acute abnormality within the spleen, gallbladder, or adrenal glands. Bile duct measures up to 6 mm, unchanged. The pancreatic duct is mildly prominent, similar to prior examination. There is a 2.7 x 2.3 cm rim enhancing lesion along the inferior margin of the pancreatic body (series 6, image 54). No hydronephrosis. No focal renal abnormality. GI/Bowel: Stomach is partially distended. The small bowel is nondilated. Prior appendectomy. The colon is nondilated. Pelvis: Visualization of the pelvis is limited due to streak artifact. The bladder is partially distended. The uterus is present. Peritoneum/Retroperitoneum: No ascites or pneumoperitoneum. The abdominal aorta is normal in caliber. Bones/Soft Tissues: There are multilevel degenerative changes of the lumbar spine. Right hip arthroplasty is partially visualized. There are advanced degenerative changes in the left hip with subchondral sclerosis and cyst-like change. Probable Bartholin's cyst partially visualized. 1. Prominence of the common bile duct and pancreatic duct are similar in appearance to prior examination. 2. Mixed solid and cystic lesion along the inferior margin of the pancreatic body is similar in appearance to prior examination. 3. Hepatic steatosis. CT CHEST PULMONARY EMBOLISM W CONTRAST    Result Date: 5/19/2021  EXAMINATION: CTA OF THE CHEST 5/19/2021 6:25 pm TECHNIQUE: CTA of the chest was performed after the administration of intravenous contrast.  Multiplanar reformatted images are provided for review. MIP images are provided for review.  Dose modulation, iterative reconstruction, and/or weight based adjustment of the mA/kV was utilized to reduce the radiation dose to as low as reasonably achievable. COMPARISON: 05/06/2021 HISTORY: ORDERING SYSTEM PROVIDED HISTORY: SOB, hx of cancer TECHNOLOGIST PROVIDED HISTORY: SOB, hx of cancer Decision Support Exception - unselect if not a suspected or confirmed emergency medical condition->Emergency Medical Condition (MA) Reason for Exam: SOB, hx of cancer FINDINGS: Pulmonary Arteries: Pulmonary arteries are adequately opacified for evaluation. No evidence of intraluminal filling defect to suggest pulmonary embolism. Main pulmonary artery is normal in caliber. Mediastinum: There are a few small nonspecific lymph nodes seen in the middle mediastinum. No suspicious lymphadenopathy. Lungs/pleura: Hyperinflated with bullous changes. . There is mild bibasilar atelectasis/fibrosis. No suspicious pulmonary masses are noted. No pleural effusions are identified. Cardiomediastinal Structures: Coronary arterial calcifications are seen. Intimal calcifications associated with the thoracic aorta. Cardiac chambers are mildly enlarged Upper Abdomen: Small hiatal hernia Soft Tissues/Bones: There are hypertrophic degenerative changes in the thoracic spine. Stable macrocalcifications in the right thyroid lobe. No acute osseous abnormalities. No acute intrathoracic abnormalities are noted. No evidence of pulmonary embolism Bullous emphysema     CTA HEAD NECK W CONTRAST    Result Date: 5/19/2021  EXAMINATION: CTA OF THE HEAD AND NECK WITH CONTRAST 5/19/2021 9:25 pm: TECHNIQUE: CTA of the head and neck was performed with the administration of intravenous contrast. Multiplanar reformatted images are provided for review. MIP images are provided for review. Stenosis of the internal carotid arteries measured using NASCET criteria. Dose modulation, iterative reconstruction, and/or weight based adjustment of the mA/kV was utilized to reduce the radiation dose to as low as reasonably achievable. 5/19/2021 11:03 PM          Lisa Nuno MD  Emergency Medicine Resident    (Please note that portions of thisnote were completed with a voice recognition program.  Efforts were made to edit the dictations but occasionally words are mis-transcribed.)        Lisa Nuno MD  Resident  05/20/21 [id number]

## 2021-05-19 NOTE — ED TRIAGE NOTES
Pt to ER with complaint of abdominal and hip pain. Pt states that she has chronic hip and back pain. Pt alert and oriented x 4, in a wheelchair in triage.

## 2021-05-19 NOTE — ED PROVIDER NOTES
after    Patient's work-up is essentially negative at this time and after discussion with the patient her numbness on the right side appears to be chronic and not new. Patient clearly moving the right side normally at this time. EKG Interpretation    Interpreted by emergency department physician    Rhythm: normal sinus   Rate: normal at 79 bpm  Axis: normal  Conduction: normal  ST Segments: no acute change  T Waves: no acute change  Q Waves: no acute change    Clinical Impression:  nonspecific EKG. Critical Care  None    This patient was evaluated in the Emergency Department for symptoms described in the history of present illness. He/she was evaluated in the context of the global COVID-19 pandemic, which necessitated consideration that the patient might be at risk for infection with the SARS-CoV-2 virus that causes COVID-19. Institutional protocols and algorithms that pertain to the evaluation of patients at risk for COVID-19 are in a state of rapid change based on information released by regulatory bodies including the CDC and federal and state organizations. These policies and algorithms were followed during the patient's care in the ED. (Please note that portions of this note were completed with a voice recognition program. Efforts were made to edit the dictations but occasionally words are mis-transcribed.  Whenever words are used in this note in any gender, they shall be construed as though they were used in the gender appropriate to the circumstances; and whenever words are used in this note in the singular or plural form, they shall be construed as though they were used in the form appropriate to the circumstances.)    MD Sotero Moreira  Attending Emergency Medicine Physician             Leron Holter, MD  05/19/21 [id number]       Leron Holter, MD  05/19/21 MD Slime  05/19/21 [id number]

## 2021-05-19 NOTE — ED TRIAGE NOTES
Pt to room 10 via WC , moaning in pain and holding her abdomen. Pt states being diagnosed with pancreatic Ca a couple of weeks ago (grading and staging not known). Pt has not started any sort of treatment as of yet. Pt C/O RUQ pain that wraps around to her back. Pt also C/O bloating and states this is here normal pancreas pain. Surgical hx includes an appendectomy a . Dr. Catherine Kenny at bedside. Pt also C/O left hip pain and states needing a hip replacement but being told she would likely night survive the surgery due to the cancer. Pt also C/O RUE N/T and pain and increased urination. Dr. Pelon Mcclain also at bedside. Pt stable, talkative and cooperative.

## 2021-05-20 LAB
EKG ATRIAL RATE: 79 BPM
EKG P AXIS: 72 DEGREES
EKG P-R INTERVAL: 160 MS
EKG Q-T INTERVAL: 400 MS
EKG QRS DURATION: 68 MS
EKG QTC CALCULATION (BAZETT): 458 MS
EKG R AXIS: 51 DEGREES
EKG T AXIS: 80 DEGREES
EKG VENTRICULAR RATE: 79 BPM

## 2021-05-20 PROCEDURE — 93010 ELECTROCARDIOGRAM REPORT: CPT | Performed by: INTERNAL MEDICINE

## 2021-05-20 ASSESSMENT — ENCOUNTER SYMPTOMS
VOMITING: 1
BLOOD IN STOOL: 0
WHEEZING: 0
SHORTNESS OF BREATH: 1
SORE THROAT: 0
CONSTIPATION: 0
NAUSEA: 1
RHINORRHEA: 0
CHEST TIGHTNESS: 0
ABDOMINAL PAIN: 1
DIARRHEA: 0

## 2021-05-26 ENCOUNTER — TELEPHONE (OUTPATIENT)
Dept: PAIN MANAGEMENT | Age: 66
End: 2021-05-26

## 2021-06-05 ENCOUNTER — HOSPITAL ENCOUNTER (OUTPATIENT)
Age: 66
Setting detail: OBSERVATION
Discharge: NURSING, REHAB OR LONG-TERM CARE | End: 2021-06-06
Attending: EMERGENCY MEDICINE | Admitting: EMERGENCY MEDICINE
Payer: MEDICARE

## 2021-06-05 ENCOUNTER — APPOINTMENT (OUTPATIENT)
Dept: GENERAL RADIOLOGY | Age: 66
End: 2021-06-05
Payer: MEDICARE

## 2021-06-05 DIAGNOSIS — R10.11 RIGHT UPPER QUADRANT ABDOMINAL PAIN: Primary | ICD-10-CM

## 2021-06-05 DIAGNOSIS — K86.89 PANCREATIC MASS: ICD-10-CM

## 2021-06-05 DIAGNOSIS — S36.200D: ICD-10-CM

## 2021-06-05 LAB
-: ABNORMAL
ABSOLUTE EOS #: <0.03 K/UL (ref 0–0.44)
ABSOLUTE IMMATURE GRANULOCYTE: <0.03 K/UL (ref 0–0.3)
ABSOLUTE LYMPH #: 1.36 K/UL (ref 1.1–3.7)
ABSOLUTE MONO #: 0.59 K/UL (ref 0.1–1.2)
ALBUMIN SERPL-MCNC: 4.1 G/DL (ref 3.5–5.2)
ALBUMIN/GLOBULIN RATIO: 1 (ref 1–2.5)
ALP BLD-CCNC: 116 U/L (ref 35–104)
ALT SERPL-CCNC: 34 U/L (ref 5–33)
AMORPHOUS: ABNORMAL
ANION GAP SERPL CALCULATED.3IONS-SCNC: 16 MMOL/L (ref 9–17)
AST SERPL-CCNC: 57 U/L
BACTERIA: ABNORMAL
BASOPHILS # BLD: 1 % (ref 0–2)
BASOPHILS ABSOLUTE: <0.03 K/UL (ref 0–0.2)
BILIRUB SERPL-MCNC: 0.48 MG/DL (ref 0.3–1.2)
BILIRUBIN URINE: ABNORMAL
BNP INTERPRETATION: NORMAL
BUN BLDV-MCNC: 11 MG/DL (ref 8–23)
BUN/CREAT BLD: ABNORMAL (ref 9–20)
CALCIUM SERPL-MCNC: 9.3 MG/DL (ref 8.6–10.4)
CASTS UA: ABNORMAL /LPF (ref 0–2)
CHLORIDE BLD-SCNC: 101 MMOL/L (ref 98–107)
CO2: 17 MMOL/L (ref 20–31)
COLOR: YELLOW
COMMENT UA: ABNORMAL
CREAT SERPL-MCNC: 0.59 MG/DL (ref 0.5–0.9)
CRYSTALS, UA: ABNORMAL /HPF
DIFFERENTIAL TYPE: ABNORMAL
EOSINOPHILS RELATIVE PERCENT: 1 % (ref 1–4)
EPITHELIAL CELLS UA: ABNORMAL /HPF (ref 0–5)
GFR AFRICAN AMERICAN: >60 ML/MIN
GFR NON-AFRICAN AMERICAN: >60 ML/MIN
GFR SERPL CREATININE-BSD FRML MDRD: ABNORMAL ML/MIN/{1.73_M2}
GFR SERPL CREATININE-BSD FRML MDRD: ABNORMAL ML/MIN/{1.73_M2}
GLUCOSE BLD-MCNC: 67 MG/DL (ref 70–99)
GLUCOSE URINE: NEGATIVE
HCT VFR BLD CALC: 47.4 % (ref 36.3–47.1)
HEMOGLOBIN: 14.9 G/DL (ref 11.9–15.1)
IMMATURE GRANULOCYTES: 1 %
KETONES, URINE: ABNORMAL
LACTIC ACID, SEPSIS WHOLE BLOOD: 1 MMOL/L (ref 0.5–1.9)
LACTIC ACID, SEPSIS WHOLE BLOOD: 2.2 MMOL/L (ref 0.5–1.9)
LACTIC ACID, SEPSIS: ABNORMAL MMOL/L (ref 0.5–1.9)
LACTIC ACID, SEPSIS: NORMAL MMOL/L (ref 0.5–1.9)
LEUKOCYTE ESTERASE, URINE: ABNORMAL
LIPASE: 43 U/L (ref 13–60)
LYMPHOCYTES # BLD: 40 % (ref 24–43)
MCH RBC QN AUTO: 31.8 PG (ref 25.2–33.5)
MCHC RBC AUTO-ENTMCNC: 31.4 G/DL (ref 28.4–34.8)
MCV RBC AUTO: 101.1 FL (ref 82.6–102.9)
MONOCYTES # BLD: 17 % (ref 3–12)
MUCUS: ABNORMAL
NITRITE, URINE: NEGATIVE
NRBC AUTOMATED: 0 PER 100 WBC
OTHER OBSERVATIONS UA: ABNORMAL
PDW BLD-RTO: 13.2 % (ref 11.8–14.4)
PH UA: 6 (ref 5–8)
PLATELET # BLD: 331 K/UL (ref 138–453)
PLATELET ESTIMATE: ABNORMAL
PMV BLD AUTO: 9.2 FL (ref 8.1–13.5)
POTASSIUM SERPL-SCNC: 4.5 MMOL/L (ref 3.7–5.3)
PRO-BNP: 178 PG/ML
PROTEIN UA: ABNORMAL
RBC # BLD: 4.69 M/UL (ref 3.95–5.11)
RBC # BLD: ABNORMAL 10*6/UL
RBC UA: ABNORMAL /HPF (ref 0–2)
RENAL EPITHELIAL, UA: ABNORMAL /HPF
SARS-COV-2, RAPID: NOT DETECTED
SEG NEUTROPHILS: 40 % (ref 36–65)
SEGMENTED NEUTROPHILS ABSOLUTE COUNT: 1.4 K/UL (ref 1.5–8.1)
SODIUM BLD-SCNC: 134 MMOL/L (ref 135–144)
SPECIFIC GRAVITY UA: 1.02 (ref 1–1.03)
SPECIMEN DESCRIPTION: NORMAL
TOTAL PROTEIN: 8.4 G/DL (ref 6.4–8.3)
TRICHOMONAS: ABNORMAL
TROPONIN INTERP: NORMAL
TROPONIN INTERP: NORMAL
TROPONIN T: NORMAL NG/ML
TROPONIN T: NORMAL NG/ML
TROPONIN, HIGH SENSITIVITY: 10 NG/L (ref 0–14)
TROPONIN, HIGH SENSITIVITY: 6 NG/L (ref 0–14)
TURBIDITY: CLEAR
URINE HGB: NEGATIVE
UROBILINOGEN, URINE: ABNORMAL
WBC # BLD: 3.4 K/UL (ref 3.5–11.3)
WBC # BLD: ABNORMAL 10*3/UL
WBC UA: ABNORMAL /HPF (ref 0–5)
YEAST: ABNORMAL

## 2021-06-05 PROCEDURE — 83605 ASSAY OF LACTIC ACID: CPT

## 2021-06-05 PROCEDURE — 93005 ELECTROCARDIOGRAM TRACING: CPT | Performed by: STUDENT IN AN ORGANIZED HEALTH CARE EDUCATION/TRAINING PROGRAM

## 2021-06-05 PROCEDURE — 81001 URINALYSIS AUTO W/SCOPE: CPT

## 2021-06-05 PROCEDURE — 71045 X-RAY EXAM CHEST 1 VIEW: CPT

## 2021-06-05 PROCEDURE — 6370000000 HC RX 637 (ALT 250 FOR IP): Performed by: STUDENT IN AN ORGANIZED HEALTH CARE EDUCATION/TRAINING PROGRAM

## 2021-06-05 PROCEDURE — 85025 COMPLETE CBC W/AUTO DIFF WBC: CPT

## 2021-06-05 PROCEDURE — 96374 THER/PROPH/DIAG INJ IV PUSH: CPT

## 2021-06-05 PROCEDURE — 6360000002 HC RX W HCPCS: Performed by: STUDENT IN AN ORGANIZED HEALTH CARE EDUCATION/TRAINING PROGRAM

## 2021-06-05 PROCEDURE — 83880 ASSAY OF NATRIURETIC PEPTIDE: CPT

## 2021-06-05 PROCEDURE — 99284 EMERGENCY DEPT VISIT MOD MDM: CPT

## 2021-06-05 PROCEDURE — 80053 COMPREHEN METABOLIC PANEL: CPT

## 2021-06-05 PROCEDURE — G0378 HOSPITAL OBSERVATION PER HR: HCPCS

## 2021-06-05 PROCEDURE — 83690 ASSAY OF LIPASE: CPT

## 2021-06-05 PROCEDURE — 2580000003 HC RX 258: Performed by: STUDENT IN AN ORGANIZED HEALTH CARE EDUCATION/TRAINING PROGRAM

## 2021-06-05 PROCEDURE — 87635 SARS-COV-2 COVID-19 AMP PRB: CPT

## 2021-06-05 PROCEDURE — 84484 ASSAY OF TROPONIN QUANT: CPT

## 2021-06-05 PROCEDURE — 96361 HYDRATE IV INFUSION ADD-ON: CPT

## 2021-06-05 RX ORDER — OXYCODONE HYDROCHLORIDE 5 MG/1
5 TABLET ORAL EVERY 6 HOURS PRN
Status: DISCONTINUED | OUTPATIENT
Start: 2021-06-05 | End: 2021-06-06 | Stop reason: HOSPADM

## 2021-06-05 RX ORDER — POLYETHYLENE GLYCOL 3350 17 G/17G
17 POWDER, FOR SOLUTION ORAL DAILY PRN
Status: DISCONTINUED | OUTPATIENT
Start: 2021-06-05 | End: 2021-06-06 | Stop reason: HOSPADM

## 2021-06-05 RX ORDER — CLOPIDOGREL BISULFATE 75 MG/1
75 TABLET ORAL DAILY
Status: DISCONTINUED | OUTPATIENT
Start: 2021-06-06 | End: 2021-06-06 | Stop reason: HOSPADM

## 2021-06-05 RX ORDER — CARVEDILOL 12.5 MG/1
6.25 TABLET ORAL 2 TIMES DAILY WITH MEALS
Status: DISCONTINUED | OUTPATIENT
Start: 2021-06-05 | End: 2021-06-06 | Stop reason: HOSPADM

## 2021-06-05 RX ORDER — ATORVASTATIN CALCIUM 80 MG/1
40 TABLET, FILM COATED ORAL NIGHTLY
Status: DISCONTINUED | OUTPATIENT
Start: 2021-06-05 | End: 2021-06-06 | Stop reason: HOSPADM

## 2021-06-05 RX ORDER — SODIUM CHLORIDE 0.9 % (FLUSH) 0.9 %
5-40 SYRINGE (ML) INJECTION PRN
Status: DISCONTINUED | OUTPATIENT
Start: 2021-06-05 | End: 2021-06-06 | Stop reason: HOSPADM

## 2021-06-05 RX ORDER — 0.9 % SODIUM CHLORIDE 0.9 %
500 INTRAVENOUS SOLUTION INTRAVENOUS ONCE
Status: COMPLETED | OUTPATIENT
Start: 2021-06-05 | End: 2021-06-05

## 2021-06-05 RX ORDER — MORPHINE SULFATE 4 MG/ML
2 INJECTION, SOLUTION INTRAMUSCULAR; INTRAVENOUS ONCE
Status: COMPLETED | OUTPATIENT
Start: 2021-06-05 | End: 2021-06-05

## 2021-06-05 RX ORDER — ONDANSETRON 2 MG/ML
4 INJECTION INTRAMUSCULAR; INTRAVENOUS EVERY 6 HOURS PRN
Status: DISCONTINUED | OUTPATIENT
Start: 2021-06-05 | End: 2021-06-06 | Stop reason: HOSPADM

## 2021-06-05 RX ORDER — ACETAMINOPHEN 500 MG
1000 TABLET ORAL EVERY 8 HOURS SCHEDULED
Status: DISCONTINUED | OUTPATIENT
Start: 2021-06-05 | End: 2021-06-06 | Stop reason: HOSPADM

## 2021-06-05 RX ORDER — SODIUM CHLORIDE 0.9 % (FLUSH) 0.9 %
5-40 SYRINGE (ML) INJECTION EVERY 12 HOURS SCHEDULED
Status: DISCONTINUED | OUTPATIENT
Start: 2021-06-05 | End: 2021-06-06 | Stop reason: HOSPADM

## 2021-06-05 RX ORDER — ONDANSETRON 4 MG/1
4 TABLET, ORALLY DISINTEGRATING ORAL EVERY 8 HOURS PRN
Status: DISCONTINUED | OUTPATIENT
Start: 2021-06-05 | End: 2021-06-06 | Stop reason: HOSPADM

## 2021-06-05 RX ORDER — GABAPENTIN 300 MG/1
300 CAPSULE ORAL 3 TIMES DAILY
Status: DISCONTINUED | OUTPATIENT
Start: 2021-06-05 | End: 2021-06-06 | Stop reason: HOSPADM

## 2021-06-05 RX ORDER — FAMOTIDINE 20 MG/1
20 TABLET, FILM COATED ORAL 2 TIMES DAILY
Status: DISCONTINUED | OUTPATIENT
Start: 2021-06-05 | End: 2021-06-06 | Stop reason: HOSPADM

## 2021-06-05 RX ORDER — AMITRIPTYLINE HYDROCHLORIDE 10 MG/1
10 TABLET, FILM COATED ORAL NIGHTLY
Status: DISCONTINUED | OUTPATIENT
Start: 2021-06-05 | End: 2021-06-06 | Stop reason: HOSPADM

## 2021-06-05 RX ORDER — BUTALBITAL, ACETAMINOPHEN AND CAFFEINE 50; 325; 40 MG/1; MG/1; MG/1
1 TABLET ORAL ONCE
Status: COMPLETED | OUTPATIENT
Start: 2021-06-05 | End: 2021-06-05

## 2021-06-05 RX ORDER — BUTALBITAL, ACETAMINOPHEN AND CAFFEINE 50; 325; 40 MG/1; MG/1; MG/1
1 TABLET ORAL EVERY 12 HOURS
Status: DISCONTINUED | OUTPATIENT
Start: 2021-06-06 | End: 2021-06-06 | Stop reason: HOSPADM

## 2021-06-05 RX ORDER — ALBUTEROL SULFATE 90 UG/1
2 AEROSOL, METERED RESPIRATORY (INHALATION) EVERY 6 HOURS PRN
Status: DISCONTINUED | OUTPATIENT
Start: 2021-06-05 | End: 2021-06-06 | Stop reason: HOSPADM

## 2021-06-05 RX ORDER — SODIUM CHLORIDE 9 MG/ML
25 INJECTION, SOLUTION INTRAVENOUS PRN
Status: DISCONTINUED | OUTPATIENT
Start: 2021-06-05 | End: 2021-06-06 | Stop reason: HOSPADM

## 2021-06-05 RX ORDER — LISINOPRIL 10 MG/1
10 TABLET ORAL DAILY
Status: DISCONTINUED | OUTPATIENT
Start: 2021-06-06 | End: 2021-06-06 | Stop reason: HOSPADM

## 2021-06-05 RX ADMIN — BUTALBITAL, ACETAMINOPHEN AND CAFFEINE 1 TABLET: 50; 325; 40 TABLET ORAL at 12:55

## 2021-06-05 RX ADMIN — SODIUM CHLORIDE 500 ML: 9 INJECTION, SOLUTION INTRAVENOUS at 11:39

## 2021-06-05 RX ADMIN — CARVEDILOL 6.25 MG: 12.5 TABLET, FILM COATED ORAL at 21:02

## 2021-06-05 RX ADMIN — FAMOTIDINE 20 MG: 20 TABLET, FILM COATED ORAL at 21:06

## 2021-06-05 RX ADMIN — GABAPENTIN 300 MG: 300 CAPSULE ORAL at 21:05

## 2021-06-05 RX ADMIN — MORPHINE SULFATE 2 MG: 4 INJECTION INTRAVENOUS at 11:40

## 2021-06-05 RX ADMIN — AMITRIPTYLINE HYDROCHLORIDE 10 MG: 10 TABLET, FILM COATED ORAL at 21:05

## 2021-06-05 RX ADMIN — SODIUM CHLORIDE, PRESERVATIVE FREE 10 ML: 5 INJECTION INTRAVENOUS at 21:08

## 2021-06-05 RX ADMIN — APIXABAN 5 MG: 5 TABLET, FILM COATED ORAL at 21:07

## 2021-06-05 RX ADMIN — OXYCODONE HYDROCHLORIDE 5 MG: 5 TABLET ORAL at 17:22

## 2021-06-05 RX ADMIN — ACETAMINOPHEN 1000 MG: 500 TABLET ORAL at 21:55

## 2021-06-05 RX ADMIN — ATORVASTATIN CALCIUM 40 MG: 80 TABLET, FILM COATED ORAL at 21:07

## 2021-06-05 ASSESSMENT — PAIN DESCRIPTION - LOCATION: LOCATION: ABDOMEN;BACK

## 2021-06-05 ASSESSMENT — PAIN SCALES - GENERAL
PAINLEVEL_OUTOF10: 9

## 2021-06-05 ASSESSMENT — ENCOUNTER SYMPTOMS
CONSTIPATION: 0
COUGH: 0
DIARRHEA: 0
CHEST TIGHTNESS: 0
BACK PAIN: 1
RHINORRHEA: 0
VOMITING: 0
NAUSEA: 0
SHORTNESS OF BREATH: 0
ABDOMINAL PAIN: 1
SORE THROAT: 0

## 2021-06-05 ASSESSMENT — PAIN DESCRIPTION - FREQUENCY: FREQUENCY: CONTINUOUS

## 2021-06-05 ASSESSMENT — PAIN DESCRIPTION - DESCRIPTORS: DESCRIPTORS: STABBING

## 2021-06-05 NOTE — ED NOTES
The following labs labeled with pt sticker and tubed:     [] Lavender   [] on ice   [] Blue   [x] Green/yellow  [x] Green/black [x] on ice  [] Pink  [] Red  [] Yellow     [] COVID-19 swab    [] Rapid     [] Urine Sample  [] Pelvic Cultures    [] Blood Cultures          Angi De Souza RN  06/05/21 [id number]

## 2021-06-05 NOTE — ED NOTES
Dr Susanne Bernstein at bedside, provided pt with juice and more crackers. Plan of care to place pt as she is homeless.       Margaret Hilton RN  06/05/21 [id number]

## 2021-06-05 NOTE — ED NOTES
Pt resting on cot with NAD noted. Pt remains on monitor, call light within reach, denies needs at this time. Will cont to monitor.       Faraz Cervantes RN  06/05/21 [id number]

## 2021-06-05 NOTE — H&P
bloody stools  Genito-Urinary ROS - No dysuria, trouble voiding, or hematuria  Musculoskeletal ROS - No myalgias, No arthalgias  Neurological ROS - No headache, no dizziness/lightheadedness, No focal weakness, no loss of sensation  Dermatological ROS - No lesions, No rash     (PQRS) Advance directives on face sheet per hospital policy. No change unless specifically mentioned in chart    [address]    has a past medical history of Appendicitis, Cerebral artery occlusion with cerebral infarction ([address] CHF (congestive heart failure) ([address] Chronic respiratory failure with hypoxia and hypercapnia (HCC), Chronic rhinitis, Cigarette smoker, COPD (chronic obstructive pulmonary disease) ([address] Depression, Dysphagia, Essential hypertension, GERD (gastroesophageal reflux disease), Heart failure, diastolic, with acute decompensation ([address] Hepatitis C, chronic ([address] History of smoking at least 1 pack per day for at least 30 years, Hyperlipidemia, Hypertension, Migraine, Moderate COPD (chronic obstructive pulmonary disease) ([address] Multiple transfusions, Neurogenic dysphagia, Osteoarthritis, Pneumonia, Scoliosis, and Substance abuse ([address] I have reviewed the past medical history with the patient and it is  pertinent to this complaint. SURGICAL HISTORY      has a past surgical history that includes  section; LEEP (); Upper gastrointestinal endoscopy (); Gastric bypass surgery; Appendectomy (2017); laparoscopic appendectomy (N/A, 3/27/2017); Upper gastrointestinal endoscopy (2018); Upper gastrointestinal endoscopy (N/A, 2018); pr colonoscopy w/biopsy single/multiple (N/A, 11/10/2018); pr egd transoral biopsy single/multiple (N/A, 2018); hip surgery (Right, 2019); Total hip arthroplasty (Right, 2019); Medication Injection (Left, 3/25/2021); and Upper gastrointestinal endoscopy (N/A, 2021).   I have reviewed and agree with Surgical History entered and it is pertinent to this complaint. CURRENT MEDICATIONS     No current facility-administered medications for this encounter. All medication charted and reviewed. ALLERGIES     is allergic to aspirin and keflet [cephalexin]. FAMILY HISTORY     She indicated that her mother is . She indicated that her father is . family history includes Breast Cancer in her mother; Heart Disease in her father. The patient denies any pertinent family history. I have reviewed and agree with the family history entered. I have reviewed the Family History and it is not significant to the case    SOCIAL HISTORY      reports that she has been smoking cigarettes. She started smoking about 51 years ago. She has a 20.00 pack-year smoking history. She has never used smokeless tobacco. She reports that she does not drink alcohol and does not use drugs. I have reviewed and agree with all Social.  There are no  concerns for substance abuse/use. PHYSICAL EXAM     INITIAL VITALS:  tympanic temperature is 97 °F (36.1 °C). Her blood pressure is 106/96 (abnormal) and her pulse is 94. Her respiration is 14 and oxygen saturation is 96%. CONSTITUTIONAL: AOx4, no apparent distress, mildly uncomfortable   HEAD: normocephalic, atraumatic   EYES: PERRLA, EOMI    ENT: moist mucous membranes, uvula midline   NECK: supple, symmetric   BACK: symmetric   LUNGS: clear to auscultation bilaterally   CARDIOVASCULAR: regular rate and rhythm, no murmurs, rubs or gallops   ABDOMEN: soft, non-tender, non-distended with normal active bowel sounds   NEUROLOGIC:  MAEx4, no focal sensory or motor deficits   MUSCULOSKELETAL: no clubbing, cyanosis or edema   SKIN: no rash or wounds       DIFFERENTIAL DIAGNOSIS/MDM:     Patient's primary issue is safety at home and ability to perform activities of daily living. Patient needs assistance with transportation, follow-up for her medical care, and management of her medications.     DIAGNOSTIC RESULTS     RADIOLOGY:   I directly visualized the following  images and reviewed the radiologist interpretations:    XR CHEST PORTABLE    Result Date: 6/5/2021  EXAMINATION: ONE XRAY VIEW OF THE CHEST 6/5/2021 11:31 am COMPARISON: 05/05/2021 HISTORY: ORDERING SYSTEM PROVIDED HISTORY: chest pain TECHNOLOGIST PROVIDED HISTORY: chest pain Reason for Exam: chest pain/  AP erect/ port. Acuity: Unknown Type of Exam: Unknown FINDINGS: Multiple linear metallic densities are again seen projecting near the thoracic inlet. Linear opacity is seen at the left base, similar to prior. No confluent airspace disease. No pleural effusion. Subtle interface is seen projecting at the lateral right apex overlying the scapula. Cardiac and mediastinal silhouettes are unchanged. Questionable small right apical pneumothorax versus overlying artifact at the lateral right apex. Correlate with symptomatology. Left basilar atelectasis or scarring. LABS:  I have reviewed and interpreted all available lab results.   Labs Reviewed   CBC WITH AUTO DIFFERENTIAL - Abnormal; Notable for the following components:       Result Value    WBC 3.4 (*)     Hematocrit 47.4 (*)     Monocytes 17 (*)     Immature Granulocytes 1 (*)     Segs Absolute 1.40 (*)     All other components within normal limits   COMPREHENSIVE METABOLIC PANEL W/ REFLEX TO MG FOR LOW K - Abnormal; Notable for the following components:    Glucose 67 (*)     Sodium 134 (*)     CO2 17 (*)     Alkaline Phosphatase 116 (*)     ALT 34 (*)     AST 57 (*)     Total Protein 8.4 (*)     All other components within normal limits   LACTATE, SEPSIS - Abnormal; Notable for the following components:    Lactic Acid, Sepsis, Whole Blood 2.2 (*)     All other components within normal limits   COVID-19, RAPID   LIPASE   TROPONIN   BRAIN NATRIURETIC PEPTIDE   LACTATE, SEPSIS   TROPONIN   URINALYSIS         CDU IMPRESSION / PLAN      Emi Daniels is a 74 y.o. female who presents with       · Safety issues at home. Known pancreatic cancer. Difficulty with follow-up and history of noncompliance. · Patient with significant social stressors and minimal resources compromising care  · Inability to get transportation and follow-up appropriately. · No clear safe living situation for patient  · Continue home medications and pain control  · Monitor vitals, labs, and imaging  · DISPO: pending consults and clinical improvement    CONSULTS:    Patient had evaluation by nurse . Patient does not have a change in medical condition but rather is significant stress in terms of her social circumstances. This is making it difficult for the patient to seek longer-term care and have stability. Patient also has safety issues at home. Patient requires a safe situation from which she can begin to care for herself and take care of her medical needs. Patient has medical needs that exceed that of homeless shelter but does not have any other place to go. Patient requires higher level of care in order to be safe but does not have an acute change or immediate need. Patient needs help with transportation and follow-up. Patient for long-term care facility. PROCEDURES:  Not indicated        PATIENT REFERRED TO:    No follow-up provider specified. --  Popeye Johnson MD   Emergency Medicine Attending    This dictation was generated by voice recognition computer software. Although all attempts are made to edit the dictation for accuracy, there may be errors in the transcription that are not intended.
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
## 2021-06-05 NOTE — ED PROVIDER NOTES
[facility] ED     Emergency Department     Faculty Attestation        I performed a history and physical examination of the patient and discussed management with the resident. I reviewed the residents note and agree with the documented findings and plan of care. Any areas of disagreement are noted on the chart. I was personally present for the key portions of any procedures. I have documented in the chart those procedures where I was not present during the key portions. I have reviewed the emergency nurses triage note. I agree with the chief complaint, past medical history, past surgical history, allergies, medications, social and family history as documented unless otherwise noted below. For Physician Assistant/ Nurse Practitioner cases/documentation I have personally evaluated this patient and have completed at least one if not all key elements of the E/M (history, physical exam, and MDM). Additional findings are as noted. Vital Signs: BP: 133/80  Pulse: 108  Resp: 14  Temp: 97 °F (36.1 °C) SpO2: 96 %  PCP:  No primary care provider on file. Pertinent Comments:         Critical Care  None    This patient was evaluated in the Emergency Department for symptoms described in the history of present illness. He/she was evaluated in the context of the global COVID-19 pandemic, which necessitated consideration that the patient might be at risk for infection with the SARS-CoV-2 virus that causes COVID-19. Institutional protocols and algorithms that pertain to the evaluation of patients at risk for COVID-19 are in a state of rapid change based on information released by regulatory bodies including the CDC and federal and state organizations. These policies and algorithms were followed during the patient's care in the ED.     (Please note that portions of this note were completed with a voice recognition program. Efforts were made to edit the dictations but occasionally words are mis-transcribed.  Whenever words are used in this note in any gender, they shall be construed as though they were used in the gender appropriate to the circumstances; and whenever words are used in this note in the singular or plural form, they shall be construed as though they were used in the form appropriate to the circumstances.)    MD Carolyn Noonan  Attending Emergency Medicine Physician             Matt Bey MD  06/05/21 8754

## 2021-06-05 NOTE — ED NOTES
Bed: 27  Expected date:   Expected time:   Means of arrival:   Comments:     Dc Morse RN  06/05/21 1100

## 2021-06-05 NOTE — CARE COORDINATION
Dr Helen Hensley spoke with pt regarding safe discharge- pt is homeless and cannot care for herself. Pt does not have a preference where she goes. Referral is made to [facility] and spoke with Uli, she will review and call back. 3:45 Ralph from [facility] calls pt is accepted and can go today.  Dr Helen Hensley aware

## 2021-06-06 VITALS
BODY MASS INDEX: 16.13 KG/M2 | SYSTOLIC BLOOD PRESSURE: 107 MMHG | WEIGHT: 80 LBS | DIASTOLIC BLOOD PRESSURE: 60 MMHG | RESPIRATION RATE: 16 BRPM | OXYGEN SATURATION: 97 % | HEIGHT: 59 IN | TEMPERATURE: 98.6 F | HEART RATE: 85 BPM

## 2021-06-06 PROCEDURE — 6370000000 HC RX 637 (ALT 250 FOR IP): Performed by: STUDENT IN AN ORGANIZED HEALTH CARE EDUCATION/TRAINING PROGRAM

## 2021-06-06 PROCEDURE — G0378 HOSPITAL OBSERVATION PER HR: HCPCS

## 2021-06-06 PROCEDURE — 2580000003 HC RX 258: Performed by: STUDENT IN AN ORGANIZED HEALTH CARE EDUCATION/TRAINING PROGRAM

## 2021-06-06 PROCEDURE — 97162 PT EVAL MOD COMPLEX 30 MIN: CPT

## 2021-06-06 PROCEDURE — 97530 THERAPEUTIC ACTIVITIES: CPT

## 2021-06-06 PROCEDURE — 97166 OT EVAL MOD COMPLEX 45 MIN: CPT

## 2021-06-06 PROCEDURE — 97535 SELF CARE MNGMENT TRAINING: CPT

## 2021-06-06 RX ORDER — OXYCODONE HYDROCHLORIDE 5 MG/1
5 TABLET ORAL EVERY 6 HOURS PRN
Qty: 56 TABLET | Refills: 0 | Status: SHIPPED | OUTPATIENT
Start: 2021-06-06 | End: 2021-06-20

## 2021-06-06 RX ADMIN — GABAPENTIN 300 MG: 300 CAPSULE ORAL at 14:37

## 2021-06-06 RX ADMIN — BUTALBITAL, ACETAMINOPHEN AND CAFFEINE 1 TABLET: 50; 325; 40 TABLET ORAL at 10:06

## 2021-06-06 RX ADMIN — CARVEDILOL 6.25 MG: 12.5 TABLET, FILM COATED ORAL at 08:59

## 2021-06-06 RX ADMIN — CLOPIDOGREL 75 MG: 75 TABLET, FILM COATED ORAL at 09:00

## 2021-06-06 RX ADMIN — APIXABAN 5 MG: 5 TABLET, FILM COATED ORAL at 09:00

## 2021-06-06 RX ADMIN — GABAPENTIN 300 MG: 300 CAPSULE ORAL at 09:00

## 2021-06-06 RX ADMIN — OXYCODONE HYDROCHLORIDE 5 MG: 5 TABLET ORAL at 14:37

## 2021-06-06 RX ADMIN — OXYCODONE HYDROCHLORIDE 5 MG: 5 TABLET ORAL at 09:00

## 2021-06-06 RX ADMIN — FAMOTIDINE 20 MG: 20 TABLET, FILM COATED ORAL at 09:00

## 2021-06-06 RX ADMIN — LISINOPRIL 10 MG: 10 TABLET ORAL at 09:00

## 2021-06-06 RX ADMIN — SODIUM CHLORIDE, PRESERVATIVE FREE 10 ML: 5 INJECTION INTRAVENOUS at 09:00

## 2021-06-06 RX ADMIN — OXYCODONE HYDROCHLORIDE 5 MG: 5 TABLET ORAL at 03:01

## 2021-06-06 ASSESSMENT — PAIN DESCRIPTION - ORIENTATION
ORIENTATION: RIGHT
ORIENTATION: RIGHT

## 2021-06-06 ASSESSMENT — PAIN DESCRIPTION - PAIN TYPE
TYPE: ACUTE PAIN

## 2021-06-06 ASSESSMENT — PAIN SCALES - GENERAL
PAINLEVEL_OUTOF10: 8
PAINLEVEL_OUTOF10: 8
PAINLEVEL_OUTOF10: 4
PAINLEVEL_OUTOF10: 9
PAINLEVEL_OUTOF10: 8
PAINLEVEL_OUTOF10: 9
PAINLEVEL_OUTOF10: 8
PAINLEVEL_OUTOF10: 8

## 2021-06-06 ASSESSMENT — PAIN DESCRIPTION - LOCATION
LOCATION: ABDOMEN;BACK
LOCATION: BACK
LOCATION: ABDOMEN;BACK

## 2021-06-06 ASSESSMENT — PAIN DESCRIPTION - PROGRESSION
CLINICAL_PROGRESSION: NOT CHANGED
CLINICAL_PROGRESSION: NOT CHANGED

## 2021-06-06 ASSESSMENT — PAIN - FUNCTIONAL ASSESSMENT
PAIN_FUNCTIONAL_ASSESSMENT: PREVENTS OR INTERFERES SOME ACTIVE ACTIVITIES AND ADLS
PAIN_FUNCTIONAL_ASSESSMENT: ACTIVITIES ARE NOT PREVENTED

## 2021-06-06 ASSESSMENT — PAIN DESCRIPTION - ONSET
ONSET: ON-GOING
ONSET: ON-GOING

## 2021-06-06 ASSESSMENT — PAIN DESCRIPTION - DESCRIPTORS
DESCRIPTORS: PRESSURE
DESCRIPTORS: ACHING;PRESSURE;SHARP

## 2021-06-06 ASSESSMENT — PAIN DESCRIPTION - FREQUENCY
FREQUENCY: CONTINUOUS
FREQUENCY: CONTINUOUS

## 2021-06-06 NOTE — ED NOTES
PT resting on cot with NAD noted, denies needs at this time. Call light within reach. Will cont to monitor.       Jose Antonio Lee RN  06/05/21 0123

## 2021-06-06 NOTE — PROGRESS NOTES
Report given to Daly Garcia at [facility]. Will send paperwork and script for roxicodone. Updated on patient assessment, history and testing done. Phone number given to contact for any further questions.   Yolande Fleming RN

## 2021-06-06 NOTE — DISCHARGE INSTR - COC
Continuity of Care Form    Patient Name: Chaim Hutton   :  1955  MRN:  [id number]    Admit date:  2021  Discharge date:  21    Code Status Order: Full Code   Advance Directives:   [facility] Documentation       Date/Time Healthcare Directive Type of Healthcare Directive Copy in [facility] [address] Agent's Name Healthcare Agent's Phone Number    21 0315  No, patient does not have an advance directive for healthcare treatment -- -- -- -- --            Admitting Physician:  Carson Olvera MD  PCP: No primary care provider on file.     Discharging Nurse: [facility] Unit/Room#: 6950/2937-14  Discharging Unit Phone Number:     Emergency Contact:   Extended Emergency Contact Information  Primary Emergency Contact: Shikha Garcia  Home Phone: [phone]  Mobile Phone: [phone]  Relation: Child    Past Surgical History:  Past Surgical History:   Procedure Laterality Date    APPENDECTOMY  2017     SECTION      GASTRIC BYPASS SURGERY      HIP SURGERY Right 2019    HIP TOTAL ARTHROPLASTY ANTERIOR APPROACH - [place]  C-ARM, (Right )    LAPAROSCOPIC APPENDECTOMY N/A 3/27/2017    APPENDECTOMY LAPAROSCOPIC performed by Jaydon Siegel MD at [facility]      [email or web address]    MEDICATION INJECTION Left 3/25/2021    STEROID INJECTION LEFT HIP  WITH FLUORO performed by Ashwini Lawrence MD at [facility] N/A 11/10/2018    COLONOSCOPY WITH BIOPSY performed by Stewart Voss MD at [facility]    [place] EGD TRANSORAL BIOPSY SINGLE/MULTIPLE N/A 2018    EGD ESOPHAGOGASTRODUODENOSCOPY performed by Ameya Glass MD at [address] [facility] Right 2019    HIP TOTAL ARTHROPLASTY ANTERIOR APPROACH - [place]  C-ARM, performed by Sadia Velazquez DO at [facility]      oesophagitis, candidiasis    UPPER GASTROINTESTINAL ENDOSCOPY  2018 Assisted  Toileting  Independent  Feeding  Independent  Med Admin  Independent  Med Delivery   [facility]IN[facility] MED Delivery:[id number]}    Wound Care Documentation and Therapy:  Puncture Hip Anterior; Left (Active)   Number of days:         Elimination:  Continence: Bowel: {YES / YY:82019}  Bladder: {YES / DB:42753}  Urinary Catheter: None   Colostomy/Ileostomy/Ileal Conduit: No       Date of Last BM: ***    Intake/Output Summary (Last 24 hours) at 6/6/2021 1416  Last data filed at 6/5/2021 2108  Gross per 24 hour   Intake 10 ml   Output    Net 10 ml     I/O last 3 completed shifts: In: 10 [I.V.:10]  Out: -     Safety Concerns: At Risk for Falls    Impairments/Disabilities:      None    Nutrition Therapy:  Current Nutrition Therapy:   - Oral Diet:  General    Routes of Feeding: Oral  Liquids: No Restrictions  Daily Fluid Restriction: no  Last Modified Barium Swallow with Video (Video Swallowing Test): not done    Treatments at the Time of Hospital Discharge:   Respiratory Treatments: ***  Oxygen Therapy:  is not on home oxygen therapy.   Ventilator:    - No ventilator support    Rehab Therapies: Physical Therapy and Occupational Therapy  Weight Bearing Status/Restrictions: No weight bearing restirctions  Other Medical Equipment (for information only, NOT a DME order):  cane  Other Treatments: ***    Patient's personal belongings (please select all that are sent with patient):  {[facility] DME Belongings:[id number]}    RN SIGNATURE:  Electronically signed by Barbara Mariano RN on 6/6/21 at 2:33 PM EDT    CASE MANAGEMENT/SOCIAL WORK SECTION    Inpatient Status Date: 6/6/21     Readmission Risk Assessment Score:  Readmission Risk              Risk of Unplanned Readmission:  0           Discharging to Facility/ Agency   [facility] Details  [address] [facility], [address] [place], [place] [address] [address]       Phone: [phone]       Fax: [phone]          Dialysis Facility (if applicable)   Name:  Address:  Dialysis
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
## 2021-06-06 NOTE — PLAN OF CARE
Problem: Skin Integrity:  Goal: Will show no infection signs and symptoms  Description: Will show no infection signs and symptoms  6/6/2021 0818 by Nasir Dave RN  Outcome: Ongoing  6/6/2021 0812 by Nasir Dave RN  Outcome: Ongoing  Goal: Absence of new skin breakdown  Description: Absence of new skin breakdown  6/6/2021 0818 by Nasir Dave RN  Outcome: Ongoing  6/6/2021 0812 by Nasir Dave RN  Outcome: Ongoing     Problem: Falls - Risk of:  Goal: Will remain free from falls  Description: Will remain free from falls  6/6/2021 0818 by Nasir Dave RN  Outcome: Ongoing  6/6/2021 0812 by Nasir Dave RN  Outcome: Ongoing  Goal: Absence of physical injury  Description: Absence of physical injury  6/6/2021 0818 by Nasir Dave RN  Outcome: Ongoing  6/6/2021 0812 by Nasir Dave RN  Outcome: Ongoing     Problem: Pain:  Goal: Pain level will decrease  Description: Pain level will decrease  6/6/2021 0818 by Nasir Dave RN  Outcome: Ongoing  6/6/2021 0812 by Nasri Dave RN  Outcome: Ongoing  Goal: Control of acute pain  Description: Control of acute pain  6/6/2021 0818 by Nasir Dave RN  Outcome: Ongoing  6/6/2021 0812 by Nasir Dave RN  Outcome: Ongoing  Goal: Control of chronic pain  Description: Control of chronic pain  6/6/2021 0818 by Nasir Dave RN  Outcome: Ongoing  6/6/2021 0812 by Nasir Dave RN  Outcome: Ongoing

## 2021-06-06 NOTE — PROGRESS NOTES
Occupational Therapy   Occupational Therapy Initial Assessment  Date: 2021   Patient Name: Keith Woods  MRN: [id number]     : 1955  Chief Complaint   Patient presents with    Abdominal Pain    Back Pain    Fatigue    Headache       Date of Service: 2021    Discharge Recommendations:    Further therapy recommended at discharge. Assessment   Performance deficits / Impairments: Decreased functional mobility ; Decreased ADL status; Decreased endurance;Decreased high-level IADLs;Decreased strength  Assessment: Due to patient limited support and fatigue/weakness impacting pt ability to safely and independently complete ADLs and functional mobility, pt would benefit from further therapy at discharge. Treatment Diagnosis: abdominal and back pain  Prognosis: Good  Decision Making: Medium Complexity  Patient Education: pt ed on POc, purpose ofeval, importance of movement, safety during functional transfers/mobility. good return  REQUIRES OT FOLLOW UP: Yes  Activity Tolerance  Activity Tolerance: Patient Tolerated treatment well;Patient limited by fatigue  Safety Devices  Safety Devices in place: Yes  Type of devices: Gait belt;Call light within reach; Left in bed  Restraints  Initially in place: No           Patient Diagnosis(es): The encounter diagnosis was Right upper quadrant abdominal pain.      has a past medical history of Appendicitis, Cerebral artery occlusion with cerebral infarction ([address] CHF (congestive heart failure) ([address] Chronic respiratory failure with hypoxia and hypercapnia (HCC), Chronic rhinitis, Cigarette smoker, COPD (chronic obstructive pulmonary disease) ([address] Depression, Dysphagia, Essential hypertension, GERD (gastroesophageal reflux disease), Heart failure, diastolic, with acute decompensation ([address] Hepatitis C, chronic ([address] History of smoking at least 1 pack per day for at least 30 years, Hyperlipidemia, Hypertension, Migraine, Moderate COPD (chronic obstructive pulmonary disease) ([facility] Multiple transfusions, Neurogenic dysphagia, Osteoarthritis, Pneumonia, Scoliosis, and Substance abuse ([facility] has a past surgical history that includes  section; LEEP (); Upper gastrointestinal endoscopy (); Gastric bypass surgery; Appendectomy (2017); laparoscopic appendectomy (N/A, 3/27/2017); Upper gastrointestinal endoscopy (2018); Upper gastrointestinal endoscopy (N/A, 2018); pr colonoscopy w/biopsy single/multiple (N/A, 11/10/2018); pr egd transoral biopsy single/multiple (N/A, 2018); hip surgery (Right, 2019); Total hip arthroplasty (Right, 2019); Medication Injection (Left, 3/25/2021); and Upper gastrointestinal endoscopy (N/A, 2021). Treatment Diagnosis: abdominal and back pain      Restrictions  Restrictions/Precautions  Restrictions/Precautions: Fall Risk  Required Braces or Orthoses?: No  Position Activity Restriction  Other position/activity restrictions: up with assist    Subjective   General  Patient assessed for rehabilitation services?: Yes  Family / Caregiver Present: No  Diagnosis: abdominal and back pain  General Comment  Comments: RN ok'd for therapy this afternoon. pt agreeable to participate in session and cooperative throughout  Patient Currently in Pain: Yes  Pain Assessment  Pain Assessment: 0-10  Pain Level: 8  Pain Type: Acute pain  Pain Location: Back  Non-Pharmaceutical Pain Intervention(s): Ambulation/Increased Activity; Distraction; Therapeutic presence  Response to Pain Intervention: Patient Satisfied    Oxygen Therapy  O2 Device: None (Room air)    Social/Functional History  Social/Functional History  Lives With: Alone  Type of Home: Homeless  Home Equipment: Quad cane (pt reported using quad cane at all times)  ADL Assistance: Independent (pt reported increased difficulty with LB ADLs)  Ambulation Assistance: Independent  Transfer Assistance: Independent  Active : No  Mode of Transportation: Walk WFL  RUE AROM (degrees)  RUE AROM : WFL  Right Hand AROM (degrees)  Right Hand AROM: WFL  LUE Strength  Gross LUE Strength: Exceptions to [facility]  L Shoulder Flex: 4-/5  L Hand General: 4/5  RUE Strength  Gross RUE Strength: Exceptions to [facility]  R Shoulder Flex: 4-/5  R Hand General: 4/5           Plan   Plan  Times per week: 2-3x/wk    AM-PAC Score        AM-PAC Inpatient Daily Activity Raw Score: 17 (06/06/21 1249)  AM-PAC Inpatient ADL T-Scale Score : 37.26 (06/06/21 1249)  ADL Inpatient CMS 0-100% Score: 50.11 (06/06/21 1249)  ADL Inpatient CMS G-Code Modifier : CK (06/06/21 1249)    Goals  Short term goals  Time Frame for Short term goals: pt will, by discharge  Short term goal 1: complete LB ADLs with min A, set up and AE, as needed  Short term goal 2: complete UB ADLs and grooming tasks with mod I  Short term goal 3: increase activity tolerance to 15+ minutes in order to participate in daily tasks  Short term goal 4: dem supervision during functional transfers/functionla mobility with LRD, as needed  Short term goal 5: dem ~6 minutes dynamic/static standing tolerance with supervision in order to complete functional tasks       Therapy Time   Individual Concurrent Group Co-treatment   Time In 1226         Time Out 1240         Minutes 14         Timed Code Treatment Minutes: Brian Almeida 27, OTR/L
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
## 2021-06-06 NOTE — ED NOTES
Pt resting on cot, NAD noted, blankets provided and lights dimmed. Call light within reach, will cont to monitor.       Yuri Pate RN  06/05/21 3226

## 2021-06-06 NOTE — PROGRESS NOTES
female who presents with        · Safety issues at home. Known pancreatic cancer. Difficulty with follow-up and history of noncompliance. · Patient with significant social stressors and minimal resources compromising care  · Inability to get transportation and follow-up appropriately. · No clear safe living situation for patient  ·  evaluated the patient; patient requires higher level of care in order to be safe. Coordination for long term care facility in place. · PT/OT ordered     · Continue home medications and pain control  · Monitor vitals, labs, and imaging  · DISPO: pending consults and clinical improvement    --  Micheline Moran, DO  Emergency Medicine Resident Physician     This dictation was generated by voice recognition computer software. Although all attempts are made to edit the dictation for accuracy, there may be errors in the transcription that are not intended.

## 2021-06-07 LAB
EKG ATRIAL RATE: 94 BPM
EKG P AXIS: 65 DEGREES
EKG P-R INTERVAL: 142 MS
EKG Q-T INTERVAL: 358 MS
EKG QRS DURATION: 68 MS
EKG QTC CALCULATION (BAZETT): 447 MS
EKG R AXIS: 65 DEGREES
EKG T AXIS: 71 DEGREES
EKG VENTRICULAR RATE: 94 BPM

## 2021-06-07 PROCEDURE — 93010 ELECTROCARDIOGRAM REPORT: CPT | Performed by: INTERNAL MEDICINE

## 2021-06-07 NOTE — DISCHARGE SUMMARY
CDU Discharge Summary        Patient:  Chaim Hutton  YOB: 1955    MRN: [id number]   Acct: [de-identified]    Primary Care Physician: No primary care provider on file. Admit date:  6/5/2021 11:00 AM  Discharge date: 6/6/2021  6:15 PM     Discharge Diagnoses:     Acute abdominal pain due to chronic pancreatic mass  Improved with symptomatic control    Follow-up:  Call today/tomorrow for a follow up appointment with No primary care provider on file. , or return to the Emergency Room with worsening symptoms    Stressed to patient the importance of following up with primary care doctor for further workup/management of symptoms. Pt verbalizes understanding and agrees with plan.     Discharge Medications:  Changes to medications          [facility]   Home Medication Instructions MPF:[id number]    Printed on:06/07/21 1939   Medication Information                      acetaminophen (TYLENOL) 500 MG tablet  Take 2 tablets by mouth every 6 hours as needed for Pain             albuterol sulfate HFA (PROVENTIL HFA) 108 (90 Base) MCG/ACT inhaler  Inhale 1-2 puffs into the lungs once for 1 dose             amitriptyline (ELAVIL) 10 MG tablet  Take 1 tablet by mouth nightly             amitriptyline (ELAVIL) 10 MG tablet  Take 1 tablet by mouth nightly for 14 days             apixaban (ELIQUIS) 5 MG TABS tablet  Take 1 tablet by mouth 2 times daily             atorvastatin (LIPITOR) 40 MG tablet  Take 1 tablet by mouth nightly             atorvastatin (LIPITOR) 40 MG tablet  Take 1 tablet by mouth daily for 14 days             butalbital-acetaminophen-caffeine (FIORICET, ESGIC) -40 MG per tablet  Take 1 tablet by mouth every 12 hours for 14 days             carvedilol (COREG) 6.25 MG tablet  Take 1 tablet by mouth 2 times daily (with meals)             carvedilol (COREG) 6.25 MG tablet  Take 1 tablet by mouth 2 times daily for 14 days             clopidogrel (PLAVIX) 75 MG tablet  Take 1 tablet by mouth daily             clopidogrel (PLAVIX) 75 MG tablet  Take 1 tablet by mouth daily             Elastic Bandages & Supports (LUMBAR BACK BRACE/SUPPORT PAD) MISC  1 Units by Does not apply route daily             famotidine (PEPCID) 20 MG tablet  Take 1 tablet by mouth 2 times daily             famotidine (PEPCID) 20 MG tablet  Take 1 tablet by mouth 2 times daily for 14 days             ferrous sulfate (FE TABS 325) 325 (65 Fe) MG EC tablet  Take 1 tablet by mouth daily             ferrous sulfate (MELISSA-HAO) 325 (65 Fe) MG tablet  Take 1 tablet by mouth daily             folic acid (FOLVITE) 1 MG tablet  Take 1 tablet by mouth daily             gabapentin (NEURONTIN) 300 MG capsule  Take 1 capsule by mouth 3 times daily for 30 days. gabapentin (NEURONTIN) 300 MG capsule  Take 1 capsule by mouth 2 times daily for 14 days. Intended supply: 90 days             ipratropium-albuterol (DUONEB) 0.5-2.5 (3) MG/3ML SOLN nebulizer solution  Inhale 3 mLs into the lungs every 4 hours as needed for Shortness of Breath             lisinopril (PRINIVIL;ZESTRIL) 10 MG tablet  Take 1 tablet by mouth daily             lisinopril (PRINIVIL;ZESTRIL) 10 MG tablet  Take 1 tablet by mouth daily for 14 days             mirtazapine (REMERON) 30 MG tablet  Take 1 tablet by mouth nightly             mirtazapine (REMERON) 30 MG tablet  Take 1 tablet by mouth nightly for 14 days             [facility].  Devices [facility]) MISC  1 Device by Does not apply route once for 1 dose             mometasone-formoterol (DULERA) 100-5 MCG/ACT inhaler  Inhale 2 puffs into the lungs 2 times daily             Multiple Vitamins-Minerals (THERAPEUTIC MULTIVITAMIN-MINERALS) tablet  Take 1 tablet by mouth daily             ondansetron (ZOFRAN-ODT) 4 MG disintegrating tablet  Take 1 tablet by mouth every 8 hours as needed for Nausea or Vomiting             oxyCODONE (ROXICODONE) 5 MG immediate release tablet  Take 1 tablet by mouth every 6 hours as needed 8.6 - 10.4 mg/dL    Sodium 134 (L) 135 - 144 mmol/L    Potassium 4.5 3.7 - 5.3 mmol/L    Chloride 101 98 - 107 mmol/L    CO2 17 (L) 20 - 31 mmol/L    Anion Gap 16 9 - 17 mmol/L    Alkaline Phosphatase 116 (H) 35 - 104 U/L    ALT 34 (H) 5 - 33 U/L    AST 57 (H) <32 U/L    Total Bilirubin 0.48 0.3 - 1.2 mg/dL    Total Protein 8.4 (H) 6.4 - 8.3 g/dL    Albumin 4.1 3.5 - 5.2 g/dL    Albumin/Globulin Ratio 1.0 1.0 - 2.5    GFR Non-African American >60 >60 mL/min    GFR African American >60 >60 mL/min    GFR Comment          GFR Staging NOT REPORTED    Lipase   Result Value Ref Range    Lipase 43 13 - 60 U/L   Troponin   Result Value Ref Range    Troponin, High Sensitivity 10 0 - 14 ng/L    Troponin T NOT REPORTED <0.03 ng/mL    Troponin Interp NOT REPORTED    Brain Natriuretic Peptide   Result Value Ref Range    Pro- <300 pg/mL    BNP Interpretation Pro-BNP Reference Range:    Urinalysis, reflex to microscopic   Result Value Ref Range    Color, UA YELLOW YELLOW    Turbidity UA CLEAR CLEAR    Glucose, Ur NEGATIVE NEGATIVE    Bilirubin Urine MODERATE (A) NEGATIVE    Ketones, Urine SMALL (A) NEGATIVE    Specific Gravity, UA 1.025 1.005 - 1.030    Urine Hgb NEGATIVE NEGATIVE    pH, UA 6.0 5.0 - 8.0    Protein, UA TRACE (A) NEGATIVE    Urobilinogen, Urine ELEVATED (A) Normal    Nitrite, Urine NEGATIVE NEGATIVE    Leukocyte Esterase, Urine SMALL (A) NEGATIVE    Urinalysis Comments NOT REPORTED    Lactate, Sepsis   Result Value Ref Range    Lactic Acid, Sepsis NOT REPORTED 0.5 - 1.9 mmol/L    Lactic Acid, Sepsis, Whole Blood 2.2 (H) 0.5 - 1.9 mmol/L   Lactate, Sepsis   Result Value Ref Range    Lactic Acid, Sepsis NOT REPORTED 0.5 - 1.9 mmol/L    Lactic Acid, Sepsis, Whole Blood 1.0 0.5 - 1.9 mmol/L   Troponin   Result Value Ref Range    Troponin, High Sensitivity 6 0 - 14 ng/L    Troponin T NOT REPORTED <0.03 ng/mL    Troponin Interp NOT REPORTED    Microscopic Urinalysis   Result Value Ref Range    -          WBC, UA 0 TO 2 0 - 5 /HPF    RBC, UA None 0 - 2 /HPF    Casts UA NOT REPORTED 0 - 2 /LPF    Crystals, UA NOT REPORTED None /HPF    Epithelial Cells UA 2 TO 5 0 - 5 /HPF    Renal Epithelial, UA NOT REPORTED 0 /HPF    Bacteria, UA FEW (A) None    Mucus, UA 1+ (A) None    Trichomonas, UA NOT REPORTED None    Amorphous, UA NOT REPORTED None    Other Observations UA NOT REPORTED NOT REQ. Yeast, UA NOT REPORTED None   EKG 12 Lead   Result Value Ref Range    Ventricular Rate 94 BPM    Atrial Rate 94 BPM    P-R Interval 142 ms    QRS Duration 68 ms    Q-T Interval 358 ms    QTc Calculation (Bazett) 447 ms    P Axis 65 degrees    R Axis 65 degrees    T Axis 71 degrees     CT Head WO Contrast    Result Date: 5/19/2021  EXAMINATION: CT OF THE HEAD WITHOUT CONTRAST  5/19/2021 6:32 pm TECHNIQUE: CT of the head was performed without the administration of intravenous contrast. Dose modulation, iterative reconstruction, and/or weight based adjustment of the mA/kV was utilized to reduce the radiation dose to as low as reasonably achievable. COMPARISON: None. HISTORY: ORDERING SYSTEM PROVIDED HISTORY: Right numbness TECHNOLOGIST PROVIDED HISTORY: Right numbness Decision Support Exception - unselect if not a suspected or confirmed emergency medical condition->Emergency Medical Condition (MA) Reason for Exam: Right numbness FINDINGS: BRAIN/VENTRICLES: The cerebral hemispheres, brainstem, and cerebellum have a normal appearance for the patient's age. The falx is midline. The ventricles and peripheral sulci are mildly dilated. There is decreased attenuation in the periventricular white matter. There is no sign of a space occupying lesion, infarction, or hemorrhage. Orbits: Portion of the orbits demonstrate no acute abnormality. SINUSES: Soft tissue opacification in the maxillary sinuses. The remaining imaged portions of the paranasal sinuses are clear. The mastoids and the middle ear chambers are clear.   Cerumen in the external auditory ascites or pneumoperitoneum. The abdominal aorta is normal in caliber. Bones/Soft Tissues: There are multilevel degenerative changes of the lumbar spine. Right hip arthroplasty is partially visualized. There are advanced degenerative changes in the left hip with subchondral sclerosis and cyst-like change. Probable Bartholin's cyst partially visualized. 1. Prominence of the common bile duct and pancreatic duct are similar in appearance to prior examination. 2. Mixed solid and cystic lesion along the inferior margin of the pancreatic body is similar in appearance to prior examination. 3. Hepatic steatosis. US GALLBLADDER RUQ    Result Date: 5/9/2021  EXAMINATION: RIGHT UPPER QUADRANT ULTRASOUND 5/9/2021 7:51 am COMPARISON: CT abdomen pelvis from 04/23/2021 HISTORY: ORDERING SYSTEM PROVIDED HISTORY: cbd enlargement TECHNOLOGIST PROVIDED HISTORY: cbd enlargement 29-year-old female with common bile duct enlargement FINDINGS: LIVER:  The liver demonstrates normal echogenicity without evidence of intrahepatic biliary ductal dilatation. Color flow projects over the main portal vein with a normal hepatopetal, monophasic waveform. Liver length measures 14.3 cm. No liver mass evident within the limitations of this study. BILIARY SYSTEM:  Gallbladder is unremarkable without evidence of pericholecystic fluid, wall thickening or stones. Negative sonographic Tompkins's sign. Gallbladder wall thickness measures 3 mm. Common bile duct is mildly enlarged measuring up to 8 mm. RIGHT KIDNEY: Right kidney measures 8.0 x 3.5 x 3.7 cm. Right renal cortical thickness measures 1 cm. No gross right-sided hydronephrosis. PANCREAS:  Visualized portions of the pancreas are unremarkable. OTHER: No evidence of right upper quadrant ascites. 1. Enlarged common bile duct measuring 8 mm in diameter. No sonographic evidence for choledocholithiasis. Further evaluation with MRCP is recommended.  2. Otherwise, unremarkable right upper quadrant abdominal ultrasound. XR CHEST PORTABLE    Result Date: 6/5/2021  EXAMINATION: ONE XRAY VIEW OF THE CHEST 6/5/2021 11:31 am COMPARISON: 05/05/2021 HISTORY: ORDERING SYSTEM PROVIDED HISTORY: chest pain TECHNOLOGIST PROVIDED HISTORY: chest pain Reason for Exam: chest pain/  AP erect/ port. Acuity: Unknown Type of Exam: Unknown FINDINGS: Multiple linear metallic densities are again seen projecting near the thoracic inlet. Linear opacity is seen at the left base, similar to prior. No confluent airspace disease. No pleural effusion. Subtle interface is seen projecting at the lateral right apex overlying the scapula. Cardiac and mediastinal silhouettes are unchanged. Questionable small right apical pneumothorax versus overlying artifact at the lateral right apex. Correlate with symptomatology. Left basilar atelectasis or scarring. CT CHEST PULMONARY EMBOLISM W CONTRAST    Result Date: 5/19/2021  EXAMINATION: CTA OF THE CHEST 5/19/2021 6:25 pm TECHNIQUE: CTA of the chest was performed after the administration of intravenous contrast.  Multiplanar reformatted images are provided for review. MIP images are provided for review. Dose modulation, iterative reconstruction, and/or weight based adjustment of the mA/kV was utilized to reduce the radiation dose to as low as reasonably achievable. COMPARISON: 05/06/2021 HISTORY: ORDERING SYSTEM PROVIDED HISTORY: SOB, hx of cancer TECHNOLOGIST PROVIDED HISTORY: SOB, hx of cancer Decision Support Exception - unselect if not a suspected or confirmed emergency medical condition->Emergency Medical Condition (MA) Reason for Exam: SOB, hx of cancer FINDINGS: Pulmonary Arteries: Pulmonary arteries are adequately opacified for evaluation. No evidence of intraluminal filling defect to suggest pulmonary embolism. Main pulmonary artery is normal in caliber. Mediastinum: There are a few small nonspecific lymph nodes seen in the middle mediastinum.  No suspicious lymphadenopathy. Lungs/pleura: Hyperinflated with bullous changes. . There is mild bibasilar atelectasis/fibrosis. No suspicious pulmonary masses are noted. No pleural effusions are identified. Cardiomediastinal Structures: Coronary arterial calcifications are seen. Intimal calcifications associated with the thoracic aorta. Cardiac chambers are mildly enlarged Upper Abdomen: Small hiatal hernia Soft Tissues/Bones: There are hypertrophic degenerative changes in the thoracic spine. Stable macrocalcifications in the right thyroid lobe. No acute osseous abnormalities. No acute intrathoracic abnormalities are noted. No evidence of pulmonary embolism Bullous emphysema     CTA HEAD NECK W CONTRAST    Result Date: 5/19/2021  EXAMINATION: CTA OF THE HEAD AND NECK WITH CONTRAST 5/19/2021 9:25 pm: TECHNIQUE: CTA of the head and neck was performed with the administration of intravenous contrast. Multiplanar reformatted images are provided for review. MIP images are provided for review. Stenosis of the internal carotid arteries measured using NASCET criteria. Dose modulation, iterative reconstruction, and/or weight based adjustment of the mA/kV was utilized to reduce the radiation dose to as low as reasonably achievable. COMPARISON: CT head without contrast May 19, 2021. CT a head and neck with contrast January 31, 2020. HISTORY: ORDERING SYSTEM PROVIDED HISTORY: Right arm and face numbness TECHNOLOGIST PROVIDED HISTORY: Right arm and face numbness Decision Support Exception - unselect if not a suspected or confirmed emergency medical condition->Emergency Medical Condition (MA) Reason for Exam: Right arm and face numbness FINDINGS: CTA NECK: AORTIC ARCH/ARCH VESSELS: No dissection or arterial injury. No significant stenosis of the brachiocephalic or subclavian arteries. CAROTID ARTERIES: No dissection, arterial injury, or hemodynamically significant stenosis by NASCET criteria.  VERTEBRAL ARTERIES: No dissection, arterial injury, or significant stenosis. SOFT TISSUES: The lung apices are clear. No cervical or superior mediastinal lymphadenopathy. The larynx and pharynx are unremarkable. Right thyroid lobe calcified nodule is noted which measures up to 11 mm in diameter. [employer] BONES: No acute osseous abnormality. CTA HEAD: ANTERIOR CIRCULATION: No significant stenosis of the intracranial internal carotid, anterior cerebral, or middle cerebral arteries. No aneurysm. POSTERIOR CIRCULATION: No significant stenosis of the vertebral, basilar, or posterior cerebral arteries. No aneurysm. OTHER: No dural venous sinus thrombosis on this non-dedicated study. BRAIN: No mass effect or midline shift. No extra-axial fluid collection. The gray-white differentiation is maintained. Ill-defined hypoattenuation is noted within cerebral white matter consistent with moderate microvascular ischemic change. Unremarkable CTA of the head and neck. Moderate cerebral white matter chronic microvascular ischemic disease. Incidental finding of 11 mm diameter calcified mid right thyroid nodule. RECOMMENDATIONS: For clinical suspicion of acute ischemia, recommend MRI brain with diffusion-weighted imaging for further evaluation 1.1 cm incidental right thyroid nodule. No follow-up imaging is recommended. Reference: J Am Odilon Radiol. 2015 Feb;12(2): 143-50           Physical Exam:    General appearance - NAD, AOx 3   Lungs -CTAB, no R/R/R  Heart - RRR, no M/R/G  Abdomen - Soft, mild diffuse tenderness to palpation, ND  Neurological:  MAEx4, No focal motor deficit, sensory loss  Extremities - Cap refil <2 sec in all ext., no edema  Skin -warm, dry      Hospital Course:  Clinical course has improved, labs and imaging reviewed. Emi Daniels originally presented to the hospital on 6/5/2021 11:00 AM. with epigastric abdominal pain, nausea, vomiting. Patient has known history of  pancreatic cancer.   At that time it was determined that She required further observation and coordination of follow-up medical care, management of medications, assistance with transportation and assistance at home. She was admitted and labs and imaging were followed daily. Imaging results as above. She is medically stable to be discharged and transferred to long-term care facility coordinated. Disposition: Home    Patient stated that they will not drive themselves home from the hospital if they have gotten pain killers/ narcotics earlier that day and that they will arrange for transportation on their own or work with the  for a ride. Patient counseled NOT to drive while under the influence of narcotics/ pain killers. Condition: Good    Patient stable and ready for discharge home. I have discussed plan of care with patient and they are in understanding. They were instructed to read discharge paperwork. All of their questions and concerns were addressed. Time Spent: 0 day      --  Susy Riggs DO  Emergency Medicine Resident Physician    This dictation was generated by voice recognition computer software. Although all attempts are made to edit the dictation for accuracy, there may be errors in the transcription that are not intended.

## 2021-06-09 ENCOUNTER — TELEPHONE (OUTPATIENT)
Dept: PAIN MANAGEMENT | Age: 66
End: 2021-06-09

## 2021-06-14 ENCOUNTER — TELEPHONE (OUTPATIENT)
Dept: PAIN MANAGEMENT | Age: 66
End: 2021-06-14

## 2021-06-14 DIAGNOSIS — R10.11 RIGHT UPPER QUADRANT ABDOMINAL PAIN: ICD-10-CM

## 2021-06-14 DIAGNOSIS — S36.200D: ICD-10-CM

## 2021-06-14 DIAGNOSIS — K86.89 PANCREATIC MASS: ICD-10-CM

## 2021-06-14 RX ORDER — OXYCODONE HYDROCHLORIDE 5 MG/1
5 TABLET ORAL EVERY 6 HOURS PRN
Qty: 56 TABLET | Refills: 0 | OUTPATIENT
Start: 2021-06-14 | End: 2021-06-28

## 2021-06-30 ENCOUNTER — OFFICE VISIT (OUTPATIENT)
Dept: PAIN MANAGEMENT | Age: 66
End: 2021-06-30
Payer: MEDICARE

## 2021-06-30 VITALS
DIASTOLIC BLOOD PRESSURE: 91 MMHG | WEIGHT: 92.6 LBS | HEIGHT: 59 IN | SYSTOLIC BLOOD PRESSURE: 168 MMHG | HEART RATE: 80 BPM | BODY MASS INDEX: 18.67 KG/M2 | RESPIRATION RATE: 18 BRPM | OXYGEN SATURATION: 96 %

## 2021-06-30 DIAGNOSIS — Z79.01 CHRONIC ANTICOAGULATION: ICD-10-CM

## 2021-06-30 DIAGNOSIS — M16.12 PRIMARY OSTEOARTHRITIS OF LEFT HIP: Primary | ICD-10-CM

## 2021-06-30 DIAGNOSIS — R69 SEVERE COMORBID ILLNESS: ICD-10-CM

## 2021-06-30 DIAGNOSIS — Z96.641 HISTORY OF TOTAL RIGHT HIP REPLACEMENT: ICD-10-CM

## 2021-06-30 PROCEDURE — 3017F COLORECTAL CA SCREEN DOC REV: CPT | Performed by: ANESTHESIOLOGY

## 2021-06-30 PROCEDURE — 1123F ACP DISCUSS/DSCN MKR DOCD: CPT | Performed by: ANESTHESIOLOGY

## 2021-06-30 PROCEDURE — 4004F PT TOBACCO SCREEN RCVD TLK: CPT | Performed by: ANESTHESIOLOGY

## 2021-06-30 PROCEDURE — 99213 OFFICE O/P EST LOW 20 MIN: CPT | Performed by: ANESTHESIOLOGY

## 2021-06-30 PROCEDURE — 4040F PNEUMOC VAC/ADMIN/RCVD: CPT | Performed by: ANESTHESIOLOGY

## 2021-06-30 PROCEDURE — G8420 CALC BMI NORM PARAMETERS: HCPCS | Performed by: ANESTHESIOLOGY

## 2021-06-30 PROCEDURE — G8428 CUR MEDS NOT DOCUMENT: HCPCS | Performed by: ANESTHESIOLOGY

## 2021-06-30 PROCEDURE — G8400 PT W/DXA NO RESULTS DOC: HCPCS | Performed by: ANESTHESIOLOGY

## 2021-06-30 PROCEDURE — 1090F PRES/ABSN URINE INCON ASSESS: CPT | Performed by: ANESTHESIOLOGY

## 2021-06-30 RX ORDER — GABAPENTIN 300 MG/1
300 CAPSULE ORAL 2 TIMES DAILY
Qty: 60 CAPSULE | Refills: 2 | Status: SHIPPED | OUTPATIENT
Start: 2021-06-30 | End: 2021-08-18

## 2021-06-30 RX ORDER — TRAMADOL HYDROCHLORIDE 50 MG/1
TABLET ORAL
COMMUNITY
Start: 2021-04-15 | End: 2021-09-27 | Stop reason: SDUPTHER

## 2021-06-30 RX ORDER — TRAMADOL HYDROCHLORIDE 50 MG/1
TABLET ORAL
Status: CANCELLED | OUTPATIENT
Start: 2021-06-30

## 2021-06-30 ASSESSMENT — ENCOUNTER SYMPTOMS
BACK PAIN: 1
RESPIRATORY NEGATIVE: 1

## 2021-06-30 NOTE — PROGRESS NOTES
The patient is a 74 y. o. Non-/non  female. Chief Complaint   Patient presents with    Hip Pain    Medication Refill        HPI  Seen in our clinic for history of chronic low back and hip pain  History of right total hip arthroplasty  Diagnosed with left hip arthritis  She is getting tramadol from our clinic  Recently had flareup of abdominal pain was in ER at [facility] was diagnosed with pancreatic mass  Currently is staying in nursing home for rehab and have a follow-up appointment with oncology  Receiving Percocet 5 mg 6 tablets a day at the nursing home      Medication Refill: Tramadol, gabapentin    Pain score Today:  9  Adverse effects (Constipation / Nausea / Sedation / sexual Dysfunction / others) : NO  Mood: fair  Sleep pattern and quality: poor  Activity level: fair    Pill count Today: none  Last dose taken: A month  OARRS report reviewed today: yes  ER/Hospitalizations/PCP visit related to pain since last visit:no   Any legal problems e.g. DUI etc.:No  Satisfied with current management: No    Opioid Contract:NA  Last Urine Dug screen dated:NA    Lab Results   Component Value Date    LABA1C 5.3 01/27/2020     Lab Results   Component Value Date     01/27/2020       Past Medical History, Past Surgical History, Social History, Allergies and Medications reviewed and updated in EPIC as indicated    Family History reviewed and is noncontributory.         Past Medical History:   Diagnosis Date    Appendicitis 3/27/2017    Cerebral artery occlusion with cerebral infarction [facility])     CHF (congestive heart failure) (HCC)     Chronic respiratory failure with hypoxia and hypercapnia (HCC)     Chronic rhinitis     Cigarette smoker     COPD (chronic obstructive pulmonary disease) ([facility] [address] 8/5/2018    Depression     Dysphagia     Essential hypertension 8/11/2018    GERD (gastroesophageal reflux disease)     Heart failure, diastolic, with acute decompensation ([address] 9/5/2018    Hepatitis C, chronic (HCC)     History of smoking at least 1 pack per day for at least 30 years     Hyperlipidemia     Hypertension     Migraine     Moderate COPD (chronic obstructive pulmonary disease) (HCC)     Multiple transfusions     Neurogenic dysphagia     Osteoarthritis     hands    Pneumonia     Scoliosis     Substance abuse ([facility] [address]     ivda and cocaine abuse in past      Past Surgical History:   Procedure Laterality Date    APPENDECTOMY  2017     SECTION      GASTRIC BYPASS SURGERY      HIP SURGERY Right 2019    HIP TOTAL ARTHROPLASTY ANTERIOR APPROACH - [facility],  C-ARM, (Right )    LAPAROSCOPIC APPENDECTOMY N/A 3/27/2017    APPENDECTOMY LAPAROSCOPIC performed by Suzan Polanco MD at [address] [place] [address]      hgsil    MEDICATION INJECTION Left 3/25/2021    STEROID INJECTION LEFT HIP  WITH FLUORO performed by Yohannes Claudio MD at [facility] N/A 11/10/2018    COLONOSCOPY WITH BIOPSY performed by Malena Foreman MD at [facility]    LA EGD TRANSORAL BIOPSY SINGLE/MULTIPLE N/A 2018    EGD ESOPHAGOGASTRODUODENOSCOPY performed by Cornelio Garcia MD at [address] [place] [address] Right 2019    HIP TOTAL ARTHROPLASTY ANTERIOR APPROACH - [facility],  C-ARM, performed by Castillo Romero DO at [address] [place] [address]      oesophagitis, candidiasis    UPPER GASTROINTESTINAL ENDOSCOPY  2018    UPPER GASTROINTESTINAL ENDOSCOPY N/A 2018    EGD CONTROL HEMORRHAGE performed by Cornelio Garcia MD at [address] [place] [address] N/A 2021    EGD W/EUS FNA performed by Rohan Shirley MD at [facility]     Social History     Socioeconomic History    Marital status: Single     Spouse name: None    Number of children: None    Years of education: None    Highest education level: None   Occupational History    None   Tobacco Use    Smoking status: Current Some capsule by mouth 2 times daily for 30 days.  Intended supply: 90 days 60 capsule 2    butalbital-acetaminophen-caffeine (FIORICET, ESGIC) -40 MG per tablet Take 1 tablet by mouth every 12 hours for 14 days 28 tablet 0    ferrous sulfate (FE TABS 325) 325 (65 Fe) MG EC tablet Take 1 tablet by mouth daily 90 tablet 3    lisinopril (PRINIVIL;ZESTRIL) 10 MG tablet Take 1 tablet by mouth daily 30 tablet 0    clopidogrel (PLAVIX) 75 MG tablet Take 1 tablet by mouth daily 30 tablet 3    carvedilol (COREG) 6.25 MG tablet Take 1 tablet by mouth 2 times daily (with meals) 60 tablet 3    apixaban (ELIQUIS) 5 MG TABS tablet Take 1 tablet by mouth 2 times daily 60 tablet 3    albuterol sulfate HFA (PROVENTIL HFA) 108 (90 Base) MCG/ACT inhaler Inhale 1-2 puffs into the lungs once for 1 dose 1 Inhaler 0    ipratropium-albuterol (DUONEB) 0.5-2.5 (3) MG/3ML SOLN nebulizer solution Inhale 3 mLs into the lungs every 4 hours as needed for Shortness of Breath 360 mL 5    vitamin B-1 100 MG tablet Take 1 tablet by mouth daily 30 tablet 3    vitamin B-12 250 MCG tablet Take 1 tablet by mouth daily 30 tablet 3    Multiple Vitamins-Minerals (THERAPEUTIC MULTIVITAMIN-MINERALS) tablet Take 1 tablet by mouth daily 30 tablet 3    thiamine mononitrate (THIAMINE) 100 MG tablet Take 1 tablet by mouth daily for 14 days 14 tablet 0    amitriptyline (ELAVIL) 10 MG tablet Take 1 tablet by mouth nightly for 14 days 14 tablet 0    mirtazapine (REMERON) 30 MG tablet Take 1 tablet by mouth nightly for 14 days 14 tablet 0    atorvastatin (LIPITOR) 40 MG tablet Take 1 tablet by mouth daily for 14 days 14 tablet 0    lisinopril (PRINIVIL;ZESTRIL) 10 MG tablet Take 1 tablet by mouth daily for 14 days 14 tablet 0    carvedilol (COREG) 6.25 MG tablet Take 1 tablet by mouth 2 times daily for 14 days 28 tablet 0    famotidine (PEPCID) 20 MG tablet Take 1 tablet by mouth 2 times daily for 14 days 28 tablet 0    ondansetron (ZOFRAN-ODT) 4 MG disintegrating tablet Take 1 tablet by mouth every 8 hours as needed for Nausea or Vomiting (Patient not taking: Reported on 6/30/2021) 10 tablet 0    acetaminophen (TYLENOL) 500 MG tablet Take 2 tablets by mouth every 6 hours as needed for Pain (Patient not taking: Reported on 6/30/2021) 40 tablet 0    famotidine (PEPCID) 20 MG tablet Take 1 tablet by mouth 2 times daily (Patient not taking: Reported on 6/30/2021) 60 tablet 3    mometasone-formoterol (DULERA) 100-5 MCG/ACT inhaler Inhale 2 puffs into the lungs 2 times daily (Patient not taking: Reported on 6/30/2021) 1 Inhaler 1    mirtazapine (REMERON) 30 MG tablet Take 1 tablet by mouth nightly (Patient not taking: Reported on 6/30/2021) 30 tablet 3    gabapentin (NEURONTIN) 300 MG capsule Take 1 capsule by mouth 3 times daily for 30 days. 90 capsule 0    ferrous sulfate (MELISSA-HAO) 325 (65 Fe) MG tablet Take 1 tablet by mouth daily 30 tablet 0    atorvastatin (LIPITOR) 40 MG tablet Take 1 tablet by mouth nightly (Patient not taking: Reported on 6/30/2021) 30 tablet 3    amitriptyline (ELAVIL) 10 MG tablet Take 1 tablet by mouth nightly (Patient not taking: Reported on 6/30/2021) 30 tablet 3    Elastic Bandages & Supports (LUMBAR BACK BRACE/SUPPORT PAD) MISC 1 Units by Does not apply route daily (Patient not taking: Reported on 6/30/2021) 1 each 0    Sennosides (SENNA) 8.6 MG CAPS 8.6 capsules (Patient not taking: Reported on 6/30/2021)      Misc. Devices [facility]) MISC 1 Device by Does not apply route once for 1 dose 1 each 0    folic acid (FOLVITE) 1 MG tablet Take 1 tablet by mouth daily (Patient not taking: Reported on 6/30/2021) 30 tablet 3     No current facility-administered medications for this visit. Review of Systems   Constitutional: Negative. Negative for fever. Respiratory: Negative. Cardiovascular: Negative. Musculoskeletal: Positive for back pain. Hip pain   Neurological: Positive for headaches. Objective:  General Appearance:  Well-appearing and in no acute distress. Vital signs: (most recent): Blood pressure (!) 168/91, pulse 80, resp. rate 18, height 4' 11\" (1.499 m), weight 92 lb 9.6 oz (42 kg), last menstrual period 03/18/2015, SpO2 96 %. Vital signs are normal.  No fever. Output: Producing urine and producing stool. HEENT: Normal HEENT exam.    Lungs:  Normal effort and normal respiratory rate. Breath sounds clear to auscultation. She is not in respiratory distress. Heart: Normal rate. Extremities: Normal range of motion. There is no deformity. Neurological: Patient is alert and oriented to person, place and time. Patient has normal coordination. Pupils:  Pupils are equal, round, and reactive to light. Pupils are equal.   Skin:  Warm and dry. No rash or cyanosis. Assessment & Plan  1. Primary osteoarthritis of left hip    2. History of total right hip replacement    3. Severe comorbid illness    4. Chronic anticoagulation        No orders of the defined types were placed in this encounter. Orders Placed This Encounter   Medications    gabapentin (NEURONTIN) 300 MG capsule     Sig: Take 1 capsule by mouth 2 times daily for 30 days.  Intended supply: 90 days     Dispense:  60 capsule     Refill:  2      Advised patient that she is getting pain medication in the much higher dose from nursing home and will not be able to continue tramadol  Will prescribe gabapentin        Electronically signed by Ashwini Lawrence MD on 6/30/2021 at 3:15 PM

## 2021-07-12 ENCOUNTER — APPOINTMENT (OUTPATIENT)
Dept: GENERAL RADIOLOGY | Age: 66
End: 2021-07-12
Attending: ANESTHESIOLOGY
Payer: MEDICARE

## 2021-07-12 ENCOUNTER — HOSPITAL ENCOUNTER (OUTPATIENT)
Age: 66
Setting detail: OUTPATIENT SURGERY
Discharge: HOME | End: 2021-07-12
Attending: ANESTHESIOLOGY | Admitting: ANESTHESIOLOGY
Payer: MEDICARE

## 2021-07-12 VITALS
SYSTOLIC BLOOD PRESSURE: 180 MMHG | HEART RATE: 77 BPM | HEIGHT: 59 IN | BODY MASS INDEX: 19.15 KG/M2 | WEIGHT: 95 LBS | DIASTOLIC BLOOD PRESSURE: 97 MMHG | TEMPERATURE: 98.3 F | RESPIRATION RATE: 16 BRPM | OXYGEN SATURATION: 94 %

## 2021-07-12 PROBLEM — M13.852 ALLERGIC ARTHRITIS OF LEFT HIP: Chronic | Status: ACTIVE | Noted: 2021-07-12

## 2021-07-12 PROBLEM — M16.10 HIP ARTHRITIS: Status: ACTIVE | Noted: 2021-07-12

## 2021-07-12 PROBLEM — M13.852 ALLERGIC ARTHRITIS OF LEFT HIP: Chronic | Status: RESOLVED | Noted: 2021-07-12 | Resolved: 2021-07-12

## 2021-07-12 PROCEDURE — 7100000010 HC PHASE II RECOVERY - FIRST 15 MIN: Performed by: ANESTHESIOLOGY

## 2021-07-12 PROCEDURE — 2580000003 HC RX 258: Performed by: ANESTHESIOLOGY

## 2021-07-12 PROCEDURE — 6360000002 HC RX W HCPCS: Performed by: ANESTHESIOLOGY

## 2021-07-12 PROCEDURE — 6360000004 HC RX CONTRAST MEDICATION: Performed by: ANESTHESIOLOGY

## 2021-07-12 PROCEDURE — 2709999900 HC NON-CHARGEABLE SUPPLY: Performed by: ANESTHESIOLOGY

## 2021-07-12 PROCEDURE — 3600000050 HC PAIN LEVEL 1 BASE: Performed by: ANESTHESIOLOGY

## 2021-07-12 PROCEDURE — 2500000003 HC RX 250 WO HCPCS: Performed by: ANESTHESIOLOGY

## 2021-07-12 PROCEDURE — 20610 DRAIN/INJ JOINT/BURSA W/O US: CPT | Performed by: ANESTHESIOLOGY

## 2021-07-12 PROCEDURE — 99152 MOD SED SAME PHYS/QHP 5/>YRS: CPT | Performed by: ANESTHESIOLOGY

## 2021-07-12 PROCEDURE — 3209999900 FLUORO FOR SURGICAL PROCEDURES

## 2021-07-12 PROCEDURE — 77002 NEEDLE LOCALIZATION BY XRAY: CPT | Performed by: ANESTHESIOLOGY

## 2021-07-12 PROCEDURE — 7100000011 HC PHASE II RECOVERY - ADDTL 15 MIN: Performed by: ANESTHESIOLOGY

## 2021-07-12 RX ORDER — SODIUM CHLORIDE, SODIUM LACTATE, POTASSIUM CHLORIDE, CALCIUM CHLORIDE 600; 310; 30; 20 MG/100ML; MG/100ML; MG/100ML; MG/100ML
INJECTION, SOLUTION INTRAVENOUS CONTINUOUS
Status: DISCONTINUED | OUTPATIENT
Start: 2021-07-12 | End: 2021-07-12 | Stop reason: HOSPADM

## 2021-07-12 RX ORDER — DEXAMETHASONE SODIUM PHOSPHATE 10 MG/ML
INJECTION INTRAMUSCULAR; INTRAVENOUS PRN
Status: DISCONTINUED | OUTPATIENT
Start: 2021-07-12 | End: 2021-07-12 | Stop reason: ALTCHOICE

## 2021-07-12 RX ORDER — BUPIVACAINE HYDROCHLORIDE 5 MG/ML
INJECTION, SOLUTION EPIDURAL; INTRACAUDAL PRN
Status: DISCONTINUED | OUTPATIENT
Start: 2021-07-12 | End: 2021-07-12 | Stop reason: ALTCHOICE

## 2021-07-12 RX ORDER — SODIUM CHLORIDE 9 MG/ML
INJECTION, SOLUTION INTRAVENOUS CONTINUOUS
Status: DISCONTINUED | OUTPATIENT
Start: 2021-07-12 | End: 2021-07-12

## 2021-07-12 RX ORDER — MIDAZOLAM HYDROCHLORIDE 1 MG/ML
INJECTION INTRAMUSCULAR; INTRAVENOUS PRN
Status: DISCONTINUED | OUTPATIENT
Start: 2021-07-12 | End: 2021-07-12 | Stop reason: ALTCHOICE

## 2021-07-12 RX ORDER — FENTANYL CITRATE 50 UG/ML
INJECTION, SOLUTION INTRAMUSCULAR; INTRAVENOUS PRN
Status: DISCONTINUED | OUTPATIENT
Start: 2021-07-12 | End: 2021-07-12 | Stop reason: ALTCHOICE

## 2021-07-12 RX ADMIN — SODIUM CHLORIDE, POTASSIUM CHLORIDE, SODIUM LACTATE AND CALCIUM CHLORIDE: 600; 310; 30; 20 INJECTION, SOLUTION INTRAVENOUS at 15:13

## 2021-07-12 ASSESSMENT — PAIN DESCRIPTION - LOCATION: LOCATION: HIP

## 2021-07-12 ASSESSMENT — PAIN DESCRIPTION - PAIN TYPE: TYPE: CHRONIC PAIN

## 2021-07-12 ASSESSMENT — PAIN DESCRIPTION - DESCRIPTORS: DESCRIPTORS: CONSTANT;ACHING

## 2021-07-12 ASSESSMENT — PAIN - FUNCTIONAL ASSESSMENT
PAIN_FUNCTIONAL_ASSESSMENT: PREVENTS OR INTERFERES SOME ACTIVE ACTIVITIES AND ADLS
PAIN_FUNCTIONAL_ASSESSMENT: 0-10

## 2021-07-12 ASSESSMENT — PAIN DESCRIPTION - ORIENTATION: ORIENTATION: LEFT

## 2021-07-12 ASSESSMENT — PAIN SCALES - GENERAL: PAINLEVEL_OUTOF10: 5

## 2021-07-12 NOTE — OP NOTE
PM  SEDATION NOTE:    ASA CLASSIFICATION  3  MP   CLASSIFICATION  2    · Moderate intravenous conscious sedation was supervised by Dr. Jignesh Truong  . The patient was independently monitored by a Registered Nurse assigned to the Procedure Room  . Monitoring included automated blood pressure, continuous EKG, Capnography and continuous pulse oximetry. . The detailed Conscious Record is permanently stored in the [place] [id number].      The following is the conscious sedation record;  Start Time:  1527  End times:  1540  Duration:  13 minutes  MEDS GIVEN 2 MG VERSED AND 50 MCG FENTANYL

## 2021-07-12 NOTE — H&P
Interval History and Physical    Pt Name: Jarad Ragland  MRN: [id number]  YOB: 1955  Date of evaluation: 7/12/2021      [x] I have reviewed the progress note found in Epic by Dr. Teri Diop dated 06/30/2021 used for an Interval History and Physical note. [x] I have examined  Emi Daniels a 74 y.o., female who is scheduled for a left hip steroid injection by Dr. Teri Diop due to left hip primary OA. The patient denies health changes since her appointment with Dr. Teri Diop on 06/30/2021. The pt has dyspnea with exertion. Pt denies fever, chills, productive cough, SOB at this time, chest pain, open sores, rashes, wounds, and history of diabetes. Pt states she had a \"mild\" stroke 2 years ago. Plavix, Eliquis, and multiple vitamins-minerals tablet were last taken on 07/11/2021. Vital signs: BP (!) 180/86   Pulse 77   Temp 98.6 °F (37 °C) (Oral)   Resp 20   LMP 03/18/2015   SpO2 96%      Dr. Teri Diop at bedside. History of migraines. Dr. Teri Diop informed of the pt's 9/10 (on the pain scale) headache and elevated blood pressure. Pt denies dizziness, lightheadedness, sudden changes in vision, chest pain, and dyspnea at this time. Allergies:  Aspirin and Keflet [cephalexin]    Past medical history, surgical history, social history, and family history were reviewed and updated in EPIC as indicated. Medications:    Prior to Admission medications    Medication Sig Start Date End Date Taking? Authorizing Provider   traMADol (ULTRAM) 50 MG tablet take 1 tablet by mouth twice a day if needed for pain 4/15/21   Historical Provider, MD   gabapentin (NEURONTIN) 300 MG capsule Take 1 capsule by mouth 2 times daily for 30 days.  Intended supply: 90 days 6/30/21 7/30/21  Walter Santizo MD   butalbital-acetaminophen-caffeine (FIORICET, [facility]) -12 MG per tablet Take 1 tablet by mouth every 12 hours for 14 days 5/13/21 6/30/21  ISIDORO Brock MD   thiamine mononitrate (THIAMINE) 100 MG tablet Take 1 tablet by mouth daily for 14 days 5/14/21 6/6/21  E Precious Acosta MD   ferrous sulfate (FE TABS 325) 325 (65 Fe) MG EC tablet Take 1 tablet by mouth daily 5/14/21   E Precious Acosta MD   amitriptyline (ELAVIL) 10 MG tablet Take 1 tablet by mouth nightly for 14 days 5/13/21 5/27/21  E Precious Acosta MD   mirtazapine (REMERON) 30 MG tablet Take 1 tablet by mouth nightly for 14 days 5/13/21 5/27/21  E Precious Acosta MD   atorvastatin (LIPITOR) 40 MG tablet Take 1 tablet by mouth daily for 14 days 5/13/21 5/27/21  E Precious Acosta MD   lisinopril (PRINIVIL;ZESTRIL) 10 MG tablet Take 1 tablet by mouth daily for 14 days 5/13/21 5/27/21  E Precious Acosta MD   carvedilol (COREG) 6.25 MG tablet Take 1 tablet by mouth 2 times daily for 14 days 5/13/21 5/27/21  E Precious Acosta MD   famotidine (PEPCID) 20 MG tablet Take 1 tablet by mouth 2 times daily for 14 days 5/13/21 5/27/21  E Precious Acosta MD   ondansetron (ZOFRAN-ODT) 4 MG disintegrating tablet Take 1 tablet by mouth every 8 hours as needed for Nausea or Vomiting  Patient not taking: Reported on 6/30/2021 5/7/21   Tara Leung DO   acetaminophen (TYLENOL) 500 MG tablet Take 2 tablets by mouth every 6 hours as needed for Pain  Patient not taking: Reported on 6/30/2021 5/7/21   Tara Leung DO   famotidine (PEPCID) 20 MG tablet Take 1 tablet by mouth 2 times daily  Patient not taking: Reported on 6/30/2021 4/6/21   Rene Hansen DO   mometasone-formoterol [facility]) 100-5 MCG/ACT inhaler Inhale 2 puffs into the lungs 2 times daily  Patient not taking: Reported on 6/30/2021 4/6/21   Rene Hansen DO   mirtazapine (REMERON) 30 MG tablet Take 1 tablet by mouth nightly  Patient not taking: Reported on 6/30/2021 4/6/21   Rene Standard, DO   lisinopril (PRINIVIL;ZESTRIL) 10 MG tablet Take 1 tablet by mouth daily 4/6/21   Rene Standard, DO   gabapentin (NEURONTIN) 300 MG capsule Take 1 capsule by mouth 3 times daily for 30 days.  4/6/21 6/6/21  Rene Standard, DO   ferrous sulfate (MELISSA-HAO) 325 (65 Fe) MG tablet Take 1 tablet by mouth daily 4/6/21   Rodolfo Arora, DO   clopidogrel (PLAVIX) 75 MG tablet Take 1 tablet by mouth daily 4/6/21   Rodolfo Arora, DO   carvedilol (COREG) 6.25 MG tablet Take 1 tablet by mouth 2 times daily (with meals) 4/6/21   Rodolfo Arora, DO   atorvastatin (LIPITOR) 40 MG tablet Take 1 tablet by mouth nightly  Patient not taking: Reported on 6/30/2021 4/6/21   Rodolfo Arora, DO   apixaban (ELIQUIS) 5 MG TABS tablet Take 1 tablet by mouth 2 times daily 4/6/21   Rodolfo Arora, DO   amitriptyline (ELAVIL) 10 MG tablet Take 1 tablet by mouth nightly  Patient not taking: Reported on 6/30/2021 4/6/21   Rodolfo Arora, DO   albuterol sulfate HFA (PROVENTIL HFA) 108 (90 Base) MCG/ACT inhaler Inhale 1-2 puffs into the lungs once for 1 dose 4/6/21 6/30/21  Rodolfo Arora,    Elastic Bandages & Supports (LUMBAR BACK BRACE/SUPPORT PAD) MISC 1 Units by Does not apply route daily  Patient not taking: Reported on 6/30/2021 2/10/21 2/10/22  Murphy Painter MD   amLODIPine (NORVASC) 10 MG tablet Take 1 tablet by mouth daily 2/9/21 2/9/21  Jessica Duran MD   Sennosides (SENNA) 8.6 MG CAPS 8.6 capsules  Patient not taking: Reported on 6/30/2021 8/2/20   Historical ProviderMD [place].  Devices [facility]) MISC 1 Device by Does not apply route once for 1 dose 8/5/19 6/6/21  Albin Scott PA-C   ipratropium-albuterol (DUONEB) 0.5-2.5 (3) MG/3ML SOLN nebulizer solution Inhale 3 mLs into the lungs every 4 hours as needed for Shortness of Breath 1/11/19   Mckenzie Mariano MD   folic acid (FOLVITE) 1 MG tablet Take 1 tablet by mouth daily  Patient not taking: Reported on 6/30/2021 10/13/18   Mckenzie Ontiveros MD   vitamin B-1 100 MG tablet Take 1 tablet by mouth daily 10/13/18   Mckenzie Ontiveros MD   vitamin B-12 250 MCG tablet Take 1 tablet by mouth daily 10/13/18   Mckenzie Ontiveros MD   Multiple Vitamins-Minerals (THERAPEUTIC MULTIVITAMIN-MINERALS) tablet Take 1 tablet by mouth daily 8/12/18   Suzy Villalpando MD       This is a 74 y.o. female who is pleasant, cooperative, alert and oriented x 3, in no acute distress. Heart: Regular rate and rhythm without murmur, gallop, or rub. Lungs: Diminished throughout. Normal respiratory effort and unlabored. No wheezes or rales bilaterally. Abdomen: RLQ tenderness. Soft, nontender, and active bowel sounds. Pedal pulses: are palpable bilaterally. Labs:  No results for input(s): HGB, HCT, WBC, MCV, PLT, NA, K, CL, CO2, BUN, CREATININE, GLUCOSE, INR, PROTIME, APTT, AST, ALT, LABALBU, HCG in the last 720 hours. No results for input(s): COVID19 in the last 720 hours. JAYDON Valdes - CNP   Electronically signed 7/12/2021 at 2:12 PM      Cammy Whitney MD   Physician   Specialty:  Pain Management   Progress Notes       Signed   Encounter Date:  6/30/2021         Related encounter: Office Visit from 6/30/2021 in [facility] [place]         Signed        Expand AllCollapse All     Show:Clear all  [x]Manual[x]Template[]Copied    Added by:  [x]Elgin Diaz    []Adamaris for details   The patient is a 74 y. o. Non-/non  female.      Chief Complaint   Patient presents with    Hip Pain    Medication Refill         HPI  Seen in our clinic for history of chronic low back and hip pain  History of right total hip arthroplasty  Diagnosed with left hip arthritis  She is getting tramadol from our clinic  Recently had flareup of abdominal pain was in ER at [facility] was diagnosed with pancreatic mass  Currently is staying in nursing home for rehab and have a follow-up appointment with oncology  Receiving Percocet 5 mg 6 tablets a day at the nursing home        Medication Refill: Tramadol, gabapentin     Pain score Today:  9  Adverse effects (Constipation / Nausea / Sedation / sexual Dysfunction / others) : NO  Mood: fair  Sleep pattern and quality: poor  Activity level: fair     Pill count Today: none  Last dose taken: A month  OARRS report reviewed today: MEDICATION INJECTION Left 3/25/2021     STEROID INJECTION LEFT HIP  WITH FLUORO performed by Enedina Balderrama MD at [address] [facility] N/A 11/10/2018     COLONOSCOPY WITH BIOPSY performed by Gian Randle MD at [facility]    MS EGD TRANSORAL BIOPSY SINGLE/MULTIPLE N/A 2018     EGD ESOPHAGOGASTRODUODENOSCOPY performed by Cornell Diez MD at [address] [facility] Right 2019     HIP TOTAL ARTHROPLASTY ANTERIOR APPROACH - [facility],  C-ARM, performed by Kris Pennington DO at [address] [place] [address]        oesophagitis, candidiasis    UPPER GASTROINTESTINAL ENDOSCOPY   2018    UPPER GASTROINTESTINAL ENDOSCOPY N/A 2018     EGD CONTROL HEMORRHAGE performed by Cornell Diez MD at [address] [place] [address] N/A 2021     EGD W/EUS FNA performed by Massiel Hamilton MD at [facility]         Social History   Social History            Socioeconomic History    Marital status: Single       Spouse name: None    Number of children: None    Years of education: None    Highest education level: None   Occupational History    None   Tobacco Use    Smoking status: Current Some Day Smoker       Packs/day: 0.50       Years: 40.00       Pack years: 20.00       Types: Cigarettes       Start date: 5       Last attempt to quit: 2018       Years since quittin.6    Smokeless tobacco: Never Used   Vaping Use    Vaping Use: Never used   Substance and Sexual Activity    Alcohol use: No       Alcohol/week: 0.0 standard drinks    Drug use: No       Types: IV, Cocaine       Comment: past history    Sexual activity: None   Other Topics Concern    None   Social History Narrative    None      Social Determinants of Health          Financial Resource Strain:     Difficulty of Paying Living Expenses:    Food Insecurity:     Worried About Running Out of Food in the Last Year:     Toby of Food in the 5 MG TABS tablet Take 1 tablet by mouth 2 times daily 60 tablet 3    albuterol sulfate HFA (PROVENTIL HFA) 108 (90 Base) MCG/ACT inhaler Inhale 1-2 puffs into the lungs once for 1 dose 1 Inhaler 0    ipratropium-albuterol (DUONEB) 0.5-2.5 (3) MG/3ML SOLN nebulizer solution Inhale 3 mLs into the lungs every 4 hours as needed for Shortness of Breath 360 mL 5    vitamin B-1 100 MG tablet Take 1 tablet by mouth daily 30 tablet 3    vitamin B-12 250 MCG tablet Take 1 tablet by mouth daily 30 tablet 3    Multiple Vitamins-Minerals (THERAPEUTIC MULTIVITAMIN-MINERALS) tablet Take 1 tablet by mouth daily 30 tablet 3    thiamine mononitrate (THIAMINE) 100 MG tablet Take 1 tablet by mouth daily for 14 days 14 tablet 0    amitriptyline (ELAVIL) 10 MG tablet Take 1 tablet by mouth nightly for 14 days 14 tablet 0    mirtazapine (REMERON) 30 MG tablet Take 1 tablet by mouth nightly for 14 days 14 tablet 0    atorvastatin (LIPITOR) 40 MG tablet Take 1 tablet by mouth daily for 14 days 14 tablet 0    lisinopril (PRINIVIL;ZESTRIL) 10 MG tablet Take 1 tablet by mouth daily for 14 days 14 tablet 0    carvedilol (COREG) 6.25 MG tablet Take 1 tablet by mouth 2 times daily for 14 days 28 tablet 0    famotidine (PEPCID) 20 MG tablet Take 1 tablet by mouth 2 times daily for 14 days 28 tablet 0    ondansetron (ZOFRAN-ODT) 4 MG disintegrating tablet Take 1 tablet by mouth every 8 hours as needed for Nausea or Vomiting (Patient not taking: Reported on 6/30/2021) 10 tablet 0    acetaminophen (TYLENOL) 500 MG tablet Take 2 tablets by mouth every 6 hours as needed for Pain (Patient not taking: Reported on 6/30/2021) 40 tablet 0    famotidine (PEPCID) 20 MG tablet Take 1 tablet by mouth 2 times daily (Patient not taking: Reported on 6/30/2021) 60 tablet 3    mometasone-formoterol (DULERA) 100-5 MCG/ACT inhaler Inhale 2 puffs into the lungs 2 times daily (Patient not taking: Reported on 6/30/2021) 1 Inhaler 1    mirtazapine (REMERON) 30 MG tablet Take 1 tablet by mouth nightly (Patient not taking: Reported on 6/30/2021) 30 tablet 3    gabapentin (NEURONTIN) 300 MG capsule Take 1 capsule by mouth 3 times daily for 30 days. 90 capsule 0    ferrous sulfate (MELISSA-HAO) 325 (65 Fe) MG tablet Take 1 tablet by mouth daily 30 tablet 0    atorvastatin (LIPITOR) 40 MG tablet Take 1 tablet by mouth nightly (Patient not taking: Reported on 6/30/2021) 30 tablet 3    amitriptyline (ELAVIL) 10 MG tablet Take 1 tablet by mouth nightly (Patient not taking: Reported on 6/30/2021) 30 tablet 3    Elastic Bandages & Supports (LUMBAR BACK BRACE/SUPPORT PAD) MISC 1 Units by Does not apply route daily (Patient not taking: Reported on 6/30/2021) 1 each 0    Sennosides (SENNA) 8.6 MG CAPS 8.6 capsules (Patient not taking: Reported on 6/30/2021)        Misc. Devices [facility]) MISC 1 Device by Does not apply route once for 1 dose 1 each 0    folic acid (FOLVITE) 1 MG tablet Take 1 tablet by mouth daily (Patient not taking: Reported on 6/30/2021) 30 tablet 3      No current facility-administered medications for this visit. Review of Systems   Constitutional: Negative. Negative for fever. Respiratory: Negative. Cardiovascular: Negative. Musculoskeletal: Positive for back pain. Hip pain   Neurological: Positive for headaches. Objective:  General Appearance:  Well-appearing and in no acute distress. Vital signs: (most recent): Blood pressure (!) 168/91, pulse 80, resp. rate 18, height 4' 11\" (1.499 m), weight 92 lb 9.6 oz (42 kg), last menstrual period 03/18/2015, SpO2 96 %. Vital signs are normal.  No fever. Output: Producing urine and producing stool. HEENT: Normal HEENT exam.    Lungs:  Normal effort and normal respiratory rate. Breath sounds clear to auscultation. She is not in respiratory distress. Heart: Normal rate. Extremities: Normal range of motion. There is no deformity.     Neurological: Patient is alert and oriented to person, place and time. Patient has normal coordination. Pupils:  Pupils are equal, round, and reactive to light. Pupils are equal.   Skin:  Warm and dry. No rash or cyanosis. Assessment & Plan  1. Primary osteoarthritis of left hip    2. History of total right hip replacement    3. Severe comorbid illness    4. Chronic anticoagulation        No orders of the defined types were placed in this encounter. Encounter Medications         Orders Placed This Encounter   Medications    gabapentin (NEURONTIN) 300 MG capsule       Sig: Take 1 capsule by mouth 2 times daily for 30 days.  Intended supply: 90 days       Dispense:  60 capsule       Refill:  2         Advised patient that she is getting pain medication in the much higher dose from nursing home and will not be able to continue tramadol  Will prescribe gabapentin           Electronically signed by Vannesa Pearce MD on 6/30/2021 at 3:15 PM               Revision History

## 2021-07-13 ENCOUNTER — HOSPITAL ENCOUNTER (OUTPATIENT)
Age: 66
Setting detail: SPECIMEN
Discharge: HOME | End: 2021-07-13
Payer: MEDICARE

## 2021-07-15 ENCOUNTER — TELEPHONE (OUTPATIENT)
Dept: PAIN MANAGEMENT | Age: 66
End: 2021-07-15

## 2021-07-15 NOTE — TELEPHONE ENCOUNTER
Pt states she is unable to fill Rx for Gabapentin at [facility]. Writer called [facility] and pharmacist stated that they ran the prescription through patients insurance and it showed that Gabapentin was filled and is ready for  at another pharmacy ([facility] # [phone]) and [facility] is unable to fill Rx until that pharmacy puts them medication back and deletes it. Writer then called patient back, pt states she is still staying in a nursing home and wasn't aware they were prescribing her Gabapentin as well at the [facility]. Pt states she has not received the medication and does not plan on being in that nursing home for much longer. Pt would rather receive Rx for Gabapentin from Dr. Pau Montero. Pt will talk to nursing home and have them cancel Rx for Gabapentin and then call her [employer] once taken care of.

## 2021-07-22 LAB — SURGICAL PATHOLOGY REPORT: NORMAL

## 2021-07-31 ENCOUNTER — APPOINTMENT (OUTPATIENT)
Dept: GENERAL RADIOLOGY | Age: 66
End: 2021-07-31
Payer: MEDICARE

## 2021-07-31 ENCOUNTER — HOSPITAL ENCOUNTER (EMERGENCY)
Age: 66
Discharge: HOME | End: 2021-07-31
Attending: EMERGENCY MEDICINE
Payer: MEDICARE

## 2021-07-31 VITALS
TEMPERATURE: 99.1 F | WEIGHT: 98 LBS | HEIGHT: 59 IN | BODY MASS INDEX: 19.76 KG/M2 | RESPIRATION RATE: 16 BRPM | OXYGEN SATURATION: 96 % | SYSTOLIC BLOOD PRESSURE: 171 MMHG | DIASTOLIC BLOOD PRESSURE: 93 MMHG | HEART RATE: 84 BPM

## 2021-07-31 DIAGNOSIS — M25.552 LEFT HIP PAIN: Primary | ICD-10-CM

## 2021-07-31 PROCEDURE — 96372 THER/PROPH/DIAG INJ SC/IM: CPT

## 2021-07-31 PROCEDURE — 99284 EMERGENCY DEPT VISIT MOD MDM: CPT

## 2021-07-31 PROCEDURE — 6360000002 HC RX W HCPCS: Performed by: STUDENT IN AN ORGANIZED HEALTH CARE EDUCATION/TRAINING PROGRAM

## 2021-07-31 PROCEDURE — 73502 X-RAY EXAM HIP UNI 2-3 VIEWS: CPT

## 2021-07-31 RX ORDER — MORPHINE SULFATE 4 MG/ML
4 INJECTION, SOLUTION INTRAMUSCULAR; INTRAVENOUS ONCE
Status: COMPLETED | OUTPATIENT
Start: 2021-07-31 | End: 2021-07-31

## 2021-07-31 RX ORDER — MORPHINE SULFATE 4 MG/ML
4 INJECTION, SOLUTION INTRAMUSCULAR; INTRAVENOUS ONCE
Status: DISCONTINUED | OUTPATIENT
Start: 2021-07-31 | End: 2021-07-31

## 2021-07-31 RX ORDER — METHOCARBAMOL 750 MG/1
750 TABLET, FILM COATED ORAL 4 TIMES DAILY
Qty: 40 TABLET | Refills: 0 | Status: SHIPPED | OUTPATIENT
Start: 2021-07-31 | End: 2021-08-10

## 2021-07-31 RX ADMIN — MORPHINE SULFATE 4 MG: 4 INJECTION, SOLUTION INTRAMUSCULAR; INTRAVENOUS at 21:25

## 2021-07-31 ASSESSMENT — ENCOUNTER SYMPTOMS
BACK PAIN: 0
SHORTNESS OF BREATH: 0
VOMITING: 0
ABDOMINAL PAIN: 0
SORE THROAT: 0
COUGH: 0

## 2021-07-31 ASSESSMENT — PAIN SCALES - GENERAL
PAINLEVEL_OUTOF10: 10
PAINLEVEL_OUTOF10: 10

## 2021-08-01 NOTE — ED PROVIDER NOTES
[facility]  ED  Emergency Department Encounter  EmergencyMedicine Resident     Pt Name:Emi Shah  MRN: [id number]  Birthdate 1955  Date of evaluation: 7/31/21  PCP:  No primary care provider on file. This patient was evaluated in the Emergency Department for symptoms described in the history of present illness. The patient was evaluated in the context of the global COVID-19 pandemic, which necessitated consideration that the patient might be at risk for infection with the SARS-CoV-2 virus that causes COVID-19. Institutional protocols and algorithms that pertain to the evaluation of patients at risk for COVID-19 are in a state of rapid change based on information released by regulatory bodies including the CDC and federal and state organizations. These policies and algorithms were followed during the patient's care in the ED. CHIEF COMPLAINT       Chief Complaint   Patient presents with    Hip Pain     left, chronic     HISTORY OF PRESENT ILLNESS  (Location/Symptom, Timing/Onset, Context/Setting, Quality, Duration, Modifying Factors, Severity.)      Emi Daniels is a 74 y.o. female who presents with 2-week history of left hip pain, patient is seeing a pain management specialist and is getting steroid injections into her left hip routinely. Patient states that her last injection was 2 weeks ago, pain has gotten better and then has gotten worse and injection is no longer working at this time. Denies recent trauma to the area. Patient states that she has been able to ambulate with a walker. Denies new symptoms at this time. Denies sensation loss. States that pain starts at the left lateral bursa, radiates down to her knee and up abdomen. Denies other symptoms including chest pain, shortness of breath, fevers, chills, nausea or vomiting.     PAST MEDICAL / SURGICAL / SOCIAL / FAMILY HISTORY      has a past medical history of Appendicitis, Cerebral artery occlusion with cerebral infarction [facility]), CHF (congestive heart failure) (HCC), Chronic respiratory failure with hypoxia and hypercapnia (HCC), Chronic rhinitis, Cigarette smoker, COPD (chronic obstructive pulmonary disease) ([facility] Depression, Dysphagia, Essential hypertension, GERD (gastroesophageal reflux disease), Heart failure, diastolic, with acute decompensation ([address] Hepatitis C, chronic ([address] History of smoking at least 1 pack per day for at least 30 years, Hyperlipidemia, Hypertension, Migraine, Moderate COPD (chronic obstructive pulmonary disease) ([address] Multiple transfusions, Neurogenic dysphagia, Osteoarthritis, Pneumonia, Scoliosis, and Substance abuse ([address] has a past surgical history that includes  section; LEEP (); Upper gastrointestinal endoscopy (); Gastric bypass surgery; Appendectomy (2017); laparoscopic appendectomy (N/A, 3/27/2017); Upper gastrointestinal endoscopy (2018); Upper gastrointestinal endoscopy (N/A, 2018); pr colonoscopy w/biopsy single/multiple (N/A, 11/10/2018); pr egd transoral biopsy single/multiple (N/A, 2018); hip surgery (Right, 2019); Total hip arthroplasty (Right, 2019); Medication Injection (Left, 3/25/2021); Upper gastrointestinal endoscopy (N/A, 2021); other surgical history (Left, 2021); Medication Injection (Left, 2021); and Medication Injection (Left, 2021).     Social History     Socioeconomic History    Marital status: Single     Spouse name: Not on file    Number of children: Not on file    Years of education: Not on file    Highest education level: Not on file   Occupational History    Not on file   Tobacco Use    Smoking status: Current Some Day Smoker     Packs/day: 0.50     Years: 40.00     Pack years: 20.00     Types: Cigarettes     Start date: 5     Last attempt to quit: 2018     Years since quittin.7    Smokeless tobacco: Never Used   Vaping Use    Vaping Use: Never used   Substance and 6/30/21  Sukhdeep Bland MD   thiamine mononitrate (THIAMINE) 100 MG tablet Take 1 tablet by mouth daily for 14 days 5/14/21 6/6/21  E Alicja Alcaraz MD   ferrous sulfate (FE TABS 325) 325 (65 Fe) MG EC tablet Take 1 tablet by mouth daily 5/14/21   E Alicja Alcaraz MD   amitriptyline (ELAVIL) 10 MG tablet Take 1 tablet by mouth nightly for 14 days 5/13/21 5/27/21  E Alicja Alcaraz MD   mirtazapine (REMERON) 30 MG tablet Take 1 tablet by mouth nightly for 14 days 5/13/21 5/27/21  E Alicja Alcaraz MD   atorvastatin (LIPITOR) 40 MG tablet Take 1 tablet by mouth daily for 14 days 5/13/21 5/27/21  E Alicja Alcaraz MD   lisinopril (PRINIVIL;ZESTRIL) 10 MG tablet Take 1 tablet by mouth daily for 14 days 5/13/21 5/27/21  E Alicja Alcaraz MD   carvedilol (COREG) 6.25 MG tablet Take 1 tablet by mouth 2 times daily for 14 days 5/13/21 5/27/21  E Alicja Alcaraz MD   famotidine (PEPCID) 20 MG tablet Take 1 tablet by mouth 2 times daily for 14 days 5/13/21 5/27/21  E Alicja Alcaraz MD   ondansetron (ZOFRAN-ODT) 4 MG disintegrating tablet Take 1 tablet by mouth every 8 hours as needed for Nausea or Vomiting  Patient not taking: Reported on 6/30/2021 5/7/21   Tara Leung DO   acetaminophen (TYLENOL) 500 MG tablet Take 2 tablets by mouth every 6 hours as needed for Pain 5/7/21   Tara Leung DO   famotidine (PEPCID) 20 MG tablet Take 1 tablet by mouth 2 times daily  Patient not taking: Reported on 6/30/2021 4/6/21   Alex Salomon DO   mometasone-formoterol [facility]) 100-5 MCG/ACT inhaler Inhale 2 puffs into the lungs 2 times daily  Patient not taking: Reported on 6/30/2021 4/6/21   Alex Salomon DO   mirtazapine (REMERON) 30 MG tablet Take 1 tablet by mouth nightly  Patient not taking: Reported on 6/30/2021 4/6/21   Alex Salomon DO   lisinopril (PRINIVIL;ZESTRIL) 10 MG tablet Take 1 tablet by mouth daily 4/6/21   Alex Salomon DO   gabapentin (NEURONTIN) 300 MG capsule Take 1 capsule by mouth 3 times daily for 30 days.  4/6/21 6/6/21  Alex Salomon DO   clopidogrel (PLAVIX) 75 MG tablet Take 1 tablet by mouth daily 4/6/21   Lawerence Harness, DO   carvedilol (COREG) 6.25 MG tablet Take 1 tablet by mouth 2 times daily (with meals) 4/6/21   Lawerence Harness, DO   atorvastatin (LIPITOR) 40 MG tablet Take 1 tablet by mouth nightly  Patient not taking: Reported on 6/30/2021 4/6/21   Lawerence Harness, DO   apixaban (ELIQUIS) 5 MG TABS tablet Take 1 tablet by mouth 2 times daily 4/6/21   Lawerence Harness, DO   amitriptyline (ELAVIL) 10 MG tablet Take 1 tablet by mouth nightly 4/6/21   Lawerence Harness, DO   albuterol sulfate HFA (PROVENTIL HFA) 108 (90 Base) MCG/ACT inhaler Inhale 1-2 puffs into the lungs once for 1 dose 4/6/21 7/12/21  Lawerence Harness, DO   Elastic Bandages & Supports (LUMBAR BACK BRACE/SUPPORT PAD) MISC 1 Units by Does not apply route daily  Patient not taking: Reported on 6/30/2021 2/10/21 2/10/22  Melody Anguiano MD   amLODIPine (NORVASC) 10 MG tablet Take 1 tablet by mouth daily 2/9/21 2/9/21  Ricardo Swan MD   Sennosides (SENNA) 8.6 MG CAPS 8.6 capsules  Patient not taking: Reported on 6/30/2021 8/2/20   Historical Provider, MD   Misc. Devices [facility]) MISC 1 Device by Does not apply route once for 1 dose 8/5/19 6/6/21  Bruce Williamson PA-C   ipratropium-albuterol (DUONEB) 0.5-2.5 (3) MG/3ML SOLN nebulizer solution Inhale 3 mLs into the lungs every 4 hours as needed for Shortness of Breath 1/11/19   Talitha Mcburney, MD   folic acid (FOLVITE) 1 MG tablet Take 1 tablet by mouth daily  Patient not taking: Reported on 6/30/2021 10/13/18   Rena Turner MD   vitamin B-1 100 MG tablet Take 1 tablet by mouth daily 10/13/18   Rena Turner MD   vitamin B-12 250 MCG tablet Take 1 tablet by mouth daily 10/13/18   Rena Turner MD   Multiple Vitamins-Minerals (THERAPEUTIC MULTIVITAMIN-MINERALS) tablet Take 1 tablet by mouth daily 8/12/18   Annabella Peraza MD       REVIEW OF SYSTEMS    (2-9 systems for level 4, 10 or more for level 5)      Review of Systems   Constitutional: Negative for chills and fever. HENT: Negative for sore throat. Eyes: Negative for visual disturbance. Respiratory: Negative for cough and shortness of breath. Cardiovascular: Negative for chest pain. Gastrointestinal: Negative for abdominal pain and vomiting. Endocrine: Negative for polyuria. Genitourinary: Negative for dysuria and hematuria. Musculoskeletal: Negative for back pain. Skin: Negative for rash. Neurological: Negative for light-headedness and headaches. Psychiatric/Behavioral: Negative for confusion. PHYSICAL EXAM   (up to 7 for level 4, 8 or more for level 5)      INITIAL VITALS:   BP (!) 171/93   Pulse 84   Temp 99.1 °F (37.3 °C) (Oral)   Resp 16   Ht 4' 11\" (1.499 m)   Wt 98 lb (44.5 kg)   LMP 03/18/2015   SpO2 96%   BMI 19.79 kg/m²     Physical Exam  Constitutional:       General: She is not in acute distress. Appearance: Normal appearance. She is normal weight. HENT:      Head: Normocephalic. Mouth/Throat:      Mouth: Mucous membranes are moist.   Eyes:      Extraocular Movements: Extraocular movements intact. Conjunctiva/sclera: Conjunctivae normal.      Pupils: Pupils are equal, round, and reactive to light. Cardiovascular:      Rate and Rhythm: Normal rate and regular rhythm. Pulses: Normal pulses. Heart sounds: Normal heart sounds. Pulmonary:      Effort: Pulmonary effort is normal.      Breath sounds: Normal breath sounds. Abdominal:      Palpations: Abdomen is soft. Tenderness: There is no abdominal tenderness. There is no right CVA tenderness or left CVA tenderness. Musculoskeletal:      Cervical back: Normal range of motion and neck supple. Comments: Limited range of movement of left hip due to pain, minimal tenderness to palpation of lateral bursa  Neurovascularly intact distally   Skin:     General: Skin is warm. Capillary Refill: Capillary refill takes less than 2 seconds. Neurological:      General: No focal deficit present. Mental Status: She is alert and oriented to person, place, and time. Mental status is at baseline. Psychiatric:         Mood and Affect: Mood normal.       DIFFERENTIAL  DIAGNOSIS     PLAN (LABS / IMAGING / EKG):  Orders Placed This Encounter   Procedures    XR HIP 2-3 VW W PELVIS LEFT       MEDICATIONS ORDERED:  Orders Placed This Encounter   Medications    DISCONTD: morphine injection 4 mg    morphine (PF) injection 4 mg    methocarbamol (ROBAXIN-750) 750 MG tablet     Sig: Take 1 tablet by mouth 4 times daily for 10 days     Dispense:  40 tablet     Refill:  0       DDX: Left hip lateral bursitis, sprain, fracture, arthritis, chronic pain    DIAGNOSTIC RESULTS / EMERGENCY DEPARTMENT COURSE / MDM   LAB RESULTS:  No results found for this visit on 07/31/21. IMPRESSION: 58-year-old lady presents to the emergency department with left hip pain that is chronic, patient has been seeing pain management specialist and her last steroid injection was 2 weeks ago. Patient states that this pain has been ongoing and worsening for the last 2 weeks after her injection. Denies trauma. Physical exam showing neurovascularly intact distally, minimal range of movement due to pain, however is mildly tender to palpation over the lateral bursa. X-ray showing osteoarthritis. Patient given analgesia, and will be discharged with Robaxin. Discussed with patient regarding follow-up with primary care doctor, pain management clinic, physiotherapy and for strict return precautions. Patient stable for discharge. RADIOLOGY:  See radiology report    EMERGENCY DEPARTMENT COURSE:  ED Course as of Jul 31 2304   Sat Jul 31, 2021 2119 XR L hip  Advanced osteoarthritis of the left femoral head with osteonecrosis and  subchondral collapse, not significantly changed since 03/02/2021.     No interval acute fracture.     [EM]      ED Course User Index  [EM] Kailyn Garcia MD        PROCEDURES:  None    CONSULTS:  None    FINAL IMPRESSION 1. Left hip pain          DISPOSITION / PLAN     DISPOSITION        PATIENT REFERRED TO:  [facility] AT [address] [address] [phone]  Schedule an appointment as soon as possible for a visit   For follow up    [facility]  [address] [facility] [address]  [phone]  Go to   As needed    [address] [facility]  [phone]  Schedule an appointment as soon as possible for a visit   For follow up      DISCHARGE MEDICATIONS:  Discharge Medication List as of 7/31/2021  9:22 PM          Dolly Granda MD  Emergency Medicine Resident    (Please note that portions of thisnote were completed with a voice recognition program.  Efforts were made to edit the dictations but occasionally words are mis-transcribed.)       Dolly Granda MD  Resident  07/31/21 [id number]

## 2021-08-01 NOTE — ED TRIAGE NOTES
Patient has chronic hip pain and it has worsened to the point that she can barely walk with a walker

## 2021-08-01 NOTE — ED PROVIDER NOTES
[facility]     Emergency Department     Faculty Attestation    I performed a history and physical examination of the patient and discussed management with the resident. I reviewed the resident´s note and agree with the documented findings and plan of care. Any areas of disagreement are noted on the chart. I was personally present for the key portions of any procedures. I have documented in the chart those procedures where I was not present during the key portions. I have reviewed the emergency nurses triage note. I agree with the chief complaint, past medical history, past surgical history, allergies, medications, social and family history as documented unless otherwise noted below. For Physician Assistant/ Nurse Practitioner cases/documentation I have personally evaluated this patient and have completed at least one if not all key elements of the E/M (history, physical exam, and MDM). Additional findings are as noted. Chronic left hip pain, no new injury, good range of motion left hip with pain, no erythema swelling or signs of cellulitis or infection.      Joe Mauro MD  07/31/21 [id number]

## 2021-08-01 NOTE — ED NOTES
Patient in need of transportation home. SW contacted [employer] [employer] and gave patient a voucher. ETA unknown.

## 2021-08-18 ENCOUNTER — OFFICE VISIT (OUTPATIENT)
Dept: FAMILY MEDICINE CLINIC | Age: 66
End: 2021-08-18
Payer: MEDICARE

## 2021-08-18 VITALS
TEMPERATURE: 98.3 F | HEIGHT: 59 IN | WEIGHT: 99 LBS | HEART RATE: 91 BPM | BODY MASS INDEX: 19.96 KG/M2 | SYSTOLIC BLOOD PRESSURE: 158 MMHG | DIASTOLIC BLOOD PRESSURE: 102 MMHG

## 2021-08-18 DIAGNOSIS — Z86.711 HISTORY OF PULMONARY EMBOLISM: ICD-10-CM

## 2021-08-18 DIAGNOSIS — Z12.31 ENCOUNTER FOR SCREENING MAMMOGRAM FOR BREAST CANCER: ICD-10-CM

## 2021-08-18 DIAGNOSIS — Z13.220 SCREENING FOR HYPERLIPIDEMIA: ICD-10-CM

## 2021-08-18 DIAGNOSIS — I10 ESSENTIAL HYPERTENSION: Primary | ICD-10-CM

## 2021-08-18 DIAGNOSIS — C49.A0 GASTROINTESTINAL STROMAL TUMOR (GIST) (HCC): ICD-10-CM

## 2021-08-18 DIAGNOSIS — I50.32 CHRONIC DIASTOLIC HEART FAILURE (HCC): ICD-10-CM

## 2021-08-18 DIAGNOSIS — Z78.0 POST-MENOPAUSAL: ICD-10-CM

## 2021-08-18 PROCEDURE — G8400 PT W/DXA NO RESULTS DOC: HCPCS | Performed by: STUDENT IN AN ORGANIZED HEALTH CARE EDUCATION/TRAINING PROGRAM

## 2021-08-18 PROCEDURE — G8427 DOCREV CUR MEDS BY ELIG CLIN: HCPCS | Performed by: STUDENT IN AN ORGANIZED HEALTH CARE EDUCATION/TRAINING PROGRAM

## 2021-08-18 PROCEDURE — 1090F PRES/ABSN URINE INCON ASSESS: CPT | Performed by: STUDENT IN AN ORGANIZED HEALTH CARE EDUCATION/TRAINING PROGRAM

## 2021-08-18 PROCEDURE — 4040F PNEUMOC VAC/ADMIN/RCVD: CPT | Performed by: STUDENT IN AN ORGANIZED HEALTH CARE EDUCATION/TRAINING PROGRAM

## 2021-08-18 PROCEDURE — 1123F ACP DISCUSS/DSCN MKR DOCD: CPT | Performed by: STUDENT IN AN ORGANIZED HEALTH CARE EDUCATION/TRAINING PROGRAM

## 2021-08-18 PROCEDURE — 99211 OFF/OP EST MAY X REQ PHY/QHP: CPT | Performed by: FAMILY MEDICINE

## 2021-08-18 PROCEDURE — G8420 CALC BMI NORM PARAMETERS: HCPCS | Performed by: STUDENT IN AN ORGANIZED HEALTH CARE EDUCATION/TRAINING PROGRAM

## 2021-08-18 PROCEDURE — 4004F PT TOBACCO SCREEN RCVD TLK: CPT | Performed by: STUDENT IN AN ORGANIZED HEALTH CARE EDUCATION/TRAINING PROGRAM

## 2021-08-18 PROCEDURE — 3017F COLORECTAL CA SCREEN DOC REV: CPT | Performed by: STUDENT IN AN ORGANIZED HEALTH CARE EDUCATION/TRAINING PROGRAM

## 2021-08-18 PROCEDURE — 99203 OFFICE O/P NEW LOW 30 MIN: CPT | Performed by: STUDENT IN AN ORGANIZED HEALTH CARE EDUCATION/TRAINING PROGRAM

## 2021-08-18 ASSESSMENT — PATIENT HEALTH QUESTIONNAIRE - PHQ9
SUM OF ALL RESPONSES TO PHQ QUESTIONS 1-9: 2
2. FEELING DOWN, DEPRESSED OR HOPELESS: 1
SUM OF ALL RESPONSES TO PHQ QUESTIONS 1-9: 2
SUM OF ALL RESPONSES TO PHQ9 QUESTIONS 1 & 2: 2
1. LITTLE INTEREST OR PLEASURE IN DOING THINGS: 1
SUM OF ALL RESPONSES TO PHQ QUESTIONS 1-9: 2

## 2021-08-18 ASSESSMENT — ENCOUNTER SYMPTOMS
SHORTNESS OF BREATH: 0
VOMITING: 0
NAUSEA: 0
ABDOMINAL DISTENTION: 0

## 2021-08-18 NOTE — PROGRESS NOTES
Subjective:    Emi Daniels is a 74 y.o. female with  has a past medical history of Appendicitis, Cerebral artery occlusion with cerebral infarction ([address] CHF (congestive heart failure) ([address] Chronic respiratory failure with hypoxia and hypercapnia ([address] Chronic rhinitis, Cigarette smoker, COPD (chronic obstructive pulmonary disease) ([address] Depression, Dysphagia, Essential hypertension, GERD (gastroesophageal reflux disease), Heart failure, diastolic, with acute decompensation ([address] Hepatitis C, chronic ([address] History of smoking at least 1 pack per day for at least 30 years, Hyperlipidemia, Hypertension, Migraine, Moderate COPD (chronic obstructive pulmonary disease) ([address] Multiple transfusions, Neurogenic dysphagia, Osteoarthritis, Pneumonia, Scoliosis, and Substance abuse ([address] Family History   Problem Relation Age of Onset   [place] Breast Cancer Mother     Heart Disease Father        Presented tothe office today for:  Chief Complaint   Patient presents with    New Patient     ed follow up Angi Zhou left hip pain    Discuss Medications     pt has med list with here, stated she is not taken all that medication unknown to which ones she is,please review    Referral - General     eye dr Man Valera     had covid vaccines will need to wait 14 days for any other vaccines       HPI 74year old female with a past medical history of hypertension, HFpEF, COPD, PE on Eliquis and a pancreatic mass who is here to establish care.     Hypertension  - BP today is 153/100  - Currently on Coreg 6.25 mg twice daily and Lisinopril 10 mg daily  - Denies any CP, SOB, headaches, lightheadedness, changes in vision     HFpEF  - Last Echo in 2019  - Will re order an echo to assess cardiac function at this time    History of PE  - Left sided subsegmental PE found in 2020  - On Eliquis 5 mg twice daily    Pancreatic Mass  - Mixed solid and cystic lesion in the inferior margin of pancreatic body found on abdominal CT in 05/19/2021  - Gastrointestinal stromal tumor  - 2.3 x 1.8 cm lesion found on endoscopy performed on 05/05/2021 by Dr. Carolyn Anderson  - Was biopsied showing GIST  - Not interested in surgery, was told she can see a hepatobiliary surgeon if she is interested    Review of Systems   Constitutional: Negative for unexpected weight change. Respiratory: Negative for shortness of breath. Cardiovascular: Negative for chest pain. Gastrointestinal: Negative for abdominal distention, nausea and vomiting. Endocrine: Negative for polyuria. Genitourinary: Negative for difficulty urinating. Musculoskeletal: Positive for arthralgias. Neurological: Negative for weakness. All other systems reviewed and are negative. Objective:    BP (!) 165/100 (Site: Left Upper Arm, Position: Sitting, Cuff Size: Medium Adult) Comment: machine  Pulse 91   Temp 98.3 °F (36.8 °C) (Temporal)   Ht 4' 11\" (1.499 m)   Wt 99 lb (44.9 kg)   LMP 03/18/2015   BMI 20.00 kg/m²    BP Readings from Last 3 Encounters:   08/18/21 (!) 165/100   07/31/21 (!) 171/93   07/12/21 (!) 180/97     Physical Exam  Vitals reviewed. Constitutional:       Comments: On wheelchair   Cardiovascular:      Rate and Rhythm: Normal rate and regular rhythm. Heart sounds: Normal heart sounds. Pulmonary:      Effort: Pulmonary effort is normal.      Breath sounds: Normal breath sounds and air entry. Abdominal:      General: Abdomen is flat. Tenderness: There is generalized abdominal tenderness. Musculoskeletal:      Right lower leg: No edema. Left lower leg: No edema. Neurological:      Mental Status: She is alert. Psychiatric:         Behavior: Behavior is cooperative.        Lab Results   Component Value Date    WBC 3.4 (L) 06/05/2021    HGB 14.9 06/05/2021    HCT 47.4 (H) 06/05/2021     06/05/2021    CHOL 182 01/27/2020    TRIG 121 01/27/2020    HDL 77 01/27/2020    ALT 34 (H) 06/05/2021    AST 57 (H) 06/05/2021     (L) 06/05/2021    K 4.5 06/05/2021     06/05/2021    CREATININE 0.59 06/05/2021    BUN 11 06/05/2021    CO2 17 (L) 06/05/2021    TSH 1.02 08/10/2020    INR 0.9 05/19/2021    LABA1C 5.3 01/27/2020     Lab Results   Component Value Date    CALCIUM 9.3 06/05/2021    PHOS 2.4 (L) 04/23/2021     Lab Results   Component Value Date    LDLCHOLESTEROL 81 01/27/2020       Assessment and Plan:    1. Essential hypertension  - Patient does not want to take any more medication  - BP was elevated at this visit  - Did talk about benefits and risks  - Will re assess at next visit    2. Post-menopausal  - DEXA Bone Density Axial Skeleton; Future    3. Encounter for screening mammogram for breast cancer  - [facility] Digital Screen Bilateral [[id number]]; Future    4. Screening for hyperlipidemia  - Lipid Panel; Future    5. History of pulmonary embolism  - Continue Eliquis 5 mg twice daily    6. Chronic diastolic heart failure (HCC)  - ECHO Complete 2D W Doppler W Color; Future  - Last Echo in 2019 showing EF 69%, LVH, Grade I diastolic dysfunction, RVSP 35 mmHg  - Does not follow anymore with her previous cardiologist Dr. Gosia Healy? 7.  [facility]  - Not interested in surgery  - Diagnosed this year      Requested Prescriptions      No prescriptions requested or ordered in this encounter       Medications Discontinued During This Encounter   Medication Reason    carvedilol (COREG) 6.25 MG tablet LIST CLEANUP    Elastic Bandages & Supports (LUMBAR BACK BRACE/SUPPORT PAD) MISC LIST CLEANUP    famotidine (PEPCID) 20 MG tablet LIST CLEANUP    famotidine (PEPCID) 20 MG tablet LIST CLEANUP    folic acid (FOLVITE) 1 MG tablet LIST CLEANUP    gabapentin (NEURONTIN) 300 MG capsule LIST CLEANUP    gabapentin (NEURONTIN) 300 MG capsule LIST CLEANUP    lisinopril (PRINIVIL;ZESTRIL) 10 MG tablet LIST CLEANUP    mirtazapine (REMERON) 30 MG tablet LIST CLEANUP    mirtazapine (REMERON) 30 MG tablet LIST CLEANUP    ondansetron (ZOFRAN-ODT) 4 MG disintegrating tablet LIST CLEANUP    thiamine mononitrate (THIAMINE) 100 MG tablet LIST CLEANUP    Sennosides (SENNA) 8.6 MG CAPS LIST CLEANUP       Return in about 4 weeks (around 9/15/2021) for HTN FU, Echo FU.

## 2021-08-18 NOTE — PROGRESS NOTES
Writer didn't check out patient or give AVS, did give patient orders.  Joanna Height checked out patient.

## 2021-08-18 NOTE — PROGRESS NOTES
Visit Information    Have you changed or started any medications since your last visit including any over-the-counter medicines, vitamins, or herbal medicines? no   Have you stopped taking any of your medications? Is so, why? -  no  Are you having any side effects from any of your medications? - no    Have you seen any other physician or provider since your last visit? NEW PATIENT   Have you had any other diagnostic tests since your last visit?  no   Have you been seen in the emergency room and/or had an admission in a hospital since we last saw you? [facility] 7[facility]  Have you had your routine dental cleaning in the past 6 months?  no     Do you have an active MyChart account? If no, what is the barrier?   No: declined     Patient Care Team:  Kika Bender MD as PCP - General (Emergency Medicine)  JYADON Birch CNP as Nurse Practitioner (Nurse Practitioner)  [address] LESLY MD as Consulting Physician (Pulmonology)  Viry Person MD as Consulting Physician (Gastroenterology)  Bonifacio Godwin RN as Nurse Navigator (Oncology)    Medical History Review  Past Medical, Family, and Social History reviewed and does not contribute to the patient presenting condition    Health Maintenance   Topic Date Due    Hepatitis A vaccine (1 of 2 - Risk 2-dose series) Never done    COVID-19 Vaccine (1) Never done    Hepatitis B vaccine (1 of 3 - Risk 3-dose series) Never done    DTaP/Tdap/Td vaccine (1 - Tdap) Never done    Shingles Vaccine (1 of 2) Never done    DEXA (modify frequency per FRAX score)  Never done    Pneumococcal 65+ years Vaccine (1 of 1 - PPSV23) Never done    Breast cancer screen  08/13/2019    Low dose CT lung screening  11/13/2020    Lipid screen  01/27/2021    Flu vaccine (1) 09/01/2021    Potassium monitoring  06/05/2022    Creatinine monitoring  06/05/2022    Colon cancer screen colonoscopy  11/10/2028    Hib vaccine  Aged Out    Meningococcal (ACWY) vaccine  Aged Out

## 2021-08-20 NOTE — PROGRESS NOTES
Attending Physician Statement  I  have discussed the care of Emi Daniels including pertinent history and exam findings with the resident. I agree with the assessment, plan and orders as documented by the resident. BP (!) 158/102 (Site: Left Upper Arm, Position: Sitting, Cuff Size: Medium Adult) Comment: manual  Pulse 91   Temp 98.3 °F (36.8 °C) (Temporal)   Ht 4' 11\" (1.499 m)   Wt 99 lb (44.9 kg)   LMP 03/18/2015   BMI 20.00 kg/m²    BP Readings from Last 3 Encounters:   08/18/21 (!) 158/102   07/31/21 (!) 171/93   07/12/21 (!) 180/97     Wt Readings from Last 3 Encounters:   08/18/21 99 lb (44.9 kg)   07/31/21 98 lb (44.5 kg)   07/12/21 95 lb (43.1 kg)          Diagnosis Orders   1. Essential hypertension     2. Post-menopausal  DEXA Bone Density Axial Skeleton   3. Encounter for screening mammogram for breast cancer  [facility] Digital Screen Bilateral [[id number]]   4. Screening for hyperlipidemia  Lipid Panel   5. History of pulmonary embolism     6. Chronic diastolic heart failure (HCC)  ECHO Complete 2D W Doppler W Color   7. Gastrointestinal stromal tumor (GIST) ([facility]         See orders. RTO as noted, discussed care plan with patient and Resident Physician. Questions answered. Call results - if indicated to patient.       Gerson Johns DO 8/20/2021 5:10 PM

## 2021-08-25 ENCOUNTER — TELEPHONE (OUTPATIENT)
Dept: ONCOLOGY | Age: 66
End: 2021-08-25

## 2021-08-25 NOTE — TELEPHONE ENCOUNTER
After reviewing chart, writer notes pt isnt interested in surgical tx for her known GIST tumor. Writer will end navigation.

## 2021-08-26 ENCOUNTER — HOSPITAL ENCOUNTER (OUTPATIENT)
Dept: MAMMOGRAPHY | Age: 66
Discharge: HOME | End: 2021-08-28
Payer: MEDICARE

## 2021-08-26 DIAGNOSIS — Z78.0 POST-MENOPAUSAL: ICD-10-CM

## 2021-08-26 PROCEDURE — 77080 DXA BONE DENSITY AXIAL: CPT

## 2021-09-27 ENCOUNTER — OFFICE VISIT (OUTPATIENT)
Dept: PAIN MANAGEMENT | Age: 66
End: 2021-09-27
Payer: MEDICARE

## 2021-09-27 VITALS — HEART RATE: 104 BPM | OXYGEN SATURATION: 97 % | SYSTOLIC BLOOD PRESSURE: 121 MMHG | DIASTOLIC BLOOD PRESSURE: 81 MMHG

## 2021-09-27 DIAGNOSIS — M25.551 PAIN OF RIGHT HIP JOINT: Primary | ICD-10-CM

## 2021-09-27 PROCEDURE — 1090F PRES/ABSN URINE INCON ASSESS: CPT | Performed by: NURSE PRACTITIONER

## 2021-09-27 PROCEDURE — 99213 OFFICE O/P EST LOW 20 MIN: CPT | Performed by: NURSE PRACTITIONER

## 2021-09-27 PROCEDURE — 4004F PT TOBACCO SCREEN RCVD TLK: CPT | Performed by: NURSE PRACTITIONER

## 2021-09-27 PROCEDURE — G8420 CALC BMI NORM PARAMETERS: HCPCS | Performed by: NURSE PRACTITIONER

## 2021-09-27 PROCEDURE — 4040F PNEUMOC VAC/ADMIN/RCVD: CPT | Performed by: NURSE PRACTITIONER

## 2021-09-27 PROCEDURE — 3017F COLORECTAL CA SCREEN DOC REV: CPT | Performed by: NURSE PRACTITIONER

## 2021-09-27 PROCEDURE — G8427 DOCREV CUR MEDS BY ELIG CLIN: HCPCS | Performed by: NURSE PRACTITIONER

## 2021-09-27 PROCEDURE — 1123F ACP DISCUSS/DSCN MKR DOCD: CPT | Performed by: NURSE PRACTITIONER

## 2021-09-27 PROCEDURE — G8399 PT W/DXA RESULTS DOCUMENT: HCPCS | Performed by: NURSE PRACTITIONER

## 2021-09-27 RX ORDER — TRAMADOL HYDROCHLORIDE 50 MG/1
50 TABLET ORAL 2 TIMES DAILY
Qty: 60 TABLET | Refills: 0 | Status: SHIPPED | OUTPATIENT
Start: 2021-09-27 | End: 2021-10-27

## 2021-09-27 RX ORDER — FAMOTIDINE 20 MG/1
TABLET, FILM COATED ORAL
COMMUNITY
Start: 2021-08-24

## 2021-09-27 RX ORDER — GABAPENTIN 300 MG/1
300 CAPSULE ORAL 3 TIMES DAILY
Qty: 90 CAPSULE | Refills: 2 | Status: ON HOLD | OUTPATIENT
Start: 2021-09-27 | End: 2022-04-24 | Stop reason: HOSPADM

## 2021-09-27 RX ORDER — IMATINIB MESYLATE 400 MG/1
TABLET, FILM COATED ORAL
Status: ON HOLD | COMMUNITY
Start: 2021-09-14 | End: 2022-04-21

## 2021-09-27 RX ORDER — MIRTAZAPINE 30 MG/1
TABLET, FILM COATED ORAL
Status: ON HOLD | COMMUNITY
Start: 2021-09-06 | End: 2022-04-24 | Stop reason: HOSPADM

## 2021-09-27 RX ORDER — OXYCODONE HYDROCHLORIDE AND ACETAMINOPHEN 5; 325 MG/1; MG/1
TABLET ORAL
Status: ON HOLD | COMMUNITY
Start: 2021-09-17 | End: 2022-01-05 | Stop reason: SDUPTHER

## 2021-09-27 RX ORDER — ONDANSETRON 8 MG/1
TABLET, ORALLY DISINTEGRATING ORAL
COMMUNITY
Start: 2021-09-23

## 2021-09-27 RX ORDER — LIDOCAINE AND MENTHOL 240; 60 MG/1; MG/1
PATCH TOPICAL
Status: ON HOLD | COMMUNITY
Start: 2021-09-13 | End: 2022-04-21

## 2021-09-27 RX ORDER — GABAPENTIN 300 MG/1
CAPSULE ORAL
Status: ON HOLD | COMMUNITY
Start: 2021-08-31 | End: 2022-04-21

## 2021-09-27 ASSESSMENT — ENCOUNTER SYMPTOMS
COUGH: 1
SHORTNESS OF BREATH: 1
CONSTIPATION: 0
BACK PAIN: 1

## 2021-09-27 NOTE — PROGRESS NOTES
Chief Complaint   Patient presents with    Hip Pain    Medication Refill    Follow Up After Procedure    Back Pain       PMH     history of chronic low back and hip pain  History of right total hip arthroplasty  Diagnosed with left hip arthritis  She was getting tramadol from our clinic until was in [facility] after diagnosed with pancreatic mass. She is now home and would like to resume  She is poor historian but believes she had appt last week at [address] and is going to start \"chemo pill\" She is unure of who her PCP is and records show new patient appt next week with [id number] NP    HPI:   Hip Pain   The pain is present in the right hip and left hip. The pain is at a severity of 9/10. The pain is moderate. The pain has been constant since onset. Associated symptoms include muscle weakness, numbness and tingling. The treatment provided mild relief. Back Pain  This is a chronic problem. The current episode started more than 1 year ago. The problem occurs constantly. The problem is unchanged. The pain is present in the lumbar spine and thoracic spine (right abd flank). The quality of the pain is described as aching and burning. The pain is at a severity of 9/10. The pain is moderate. The pain is the same all the time. The symptoms are aggravated by bending, lying down and position. Associated symptoms include numbness and tingling. Pertinent negatives include no chest pain or fever. She has tried analgesics for the symptoms. The treatment provided mild relief. S/P: Left hip steroid injection DOS 7/12/21      Outcome   Any improvement of activity?   No   Any side effects (appetite,leg cramping,facial fleshing):none   Increase of pain:  Yes   Pain score Today:  9  % of pain relief:0  Pain diary (medial branch block): No      Pain score Today:  9  Adverse effects (Constipation / Nausea / Sedation / sexual Dysfunction / others) : none  Mood: poor  Sleep pattern and quality: poor  Activity level: poor    Pill count Today:n/a  Last dose taken  9/27/21  OARRS report reviewed today: yes  ER/Hospitalizations/PCP visit related to pain since last visit:yes   Any legal problems e.g. DUI etc.:No  Satisfied with current management: Yes    Opioid Contract:11/10/2020  Last Urine Dug screen dated:10/20/20    Lab Results   Component Value Date    LABA1C 5.3 01/27/2020     Lab Results   Component Value Date     01/27/2020       Past Medical History, Past Surgical History, Social History, Allergies and Medications, reviewed and updated in EPIC as indicated  Controlled Substance Monitoring:    Acute and Chronic Pain Monitoring:   RX Monitoring 9/27/2021   Attestation -   Periodic Controlled Substance Monitoring Possible medication side effects, risk of tolerance/dependence & alternative treatments discussed. ;No signs of potential drug abuse or diversion identified. ;Assessed functional status. ;Obtaining appropriate analgesic effect of treatment. Chronic Pain > 50 MEDD -         Periodic Controlled Substance Monitoring: Possible medication side effects, risk of tolerance/dependence & alternative treatments discussed., No signs of potential drug abuse or diversion identified. , Assessed functional status., Obtaining appropriate analgesic effect of treatment.  Sue [place], APRN - CNP)      Past Medical History:   Diagnosis Date    Appendicitis 3/27/2017    Cerebral artery occlusion with cerebral infarction [facility])     CHF (congestive heart failure) (HCC)     Chronic respiratory failure with hypoxia and hypercapnia (HCC)     Chronic rhinitis     Cigarette smoker     COPD (chronic obstructive pulmonary disease) ([facility] 8/5/2018    Depression     Dysphagia     Essential hypertension 8/11/2018    GERD (gastroesophageal reflux disease)     Heart failure, diastolic, with acute decompensation ([facility] 9/5/2018    Hepatitis C, chronic (HCC)     History of smoking at least 1 pack per day for at least 30 years     Hyperlipidemia     Hypertension     Migraine     Moderate COPD (chronic obstructive pulmonary disease) (HCC)     Multiple transfusions     Neurogenic dysphagia     Osteoarthritis     hands    Pneumonia     Scoliosis     Substance abuse ([facility]     ivda and cocaine abuse in past       Past Surgical History:   Procedure Laterality Date    APPENDECTOMY  2017     SECTION      GASTRIC BYPASS SURGERY      HIP SURGERY Right 2019    HIP TOTAL ARTHROPLASTY ANTERIOR APPROACH - [facility],  C-[facility], (Right )    LAPAROSCOPIC APPENDECTOMY N/A 3/27/2017    APPENDECTOMY LAPAROSCOPIC performed by Pamela Mojica MD at [facility]      hgsil    MEDICATION INJECTION Left 3/25/2021    STEROID INJECTION LEFT HIP  WITH FLUORO performed by Elizabeth Lanier MD at [address] Left 2021    INJECTION STEROID LEFT HIP WITH FLUORO- ATTEMPTED performed by Elizabeth Lanier MD at [address] Left 2021    INJECTION STEROID LEFT HIP performed by Elizabeth Lanier MD at [address] [place] [address] Left 2021    hip injection    ND COLONOSCOPY W/BIOPSY SINGLE/MULTIPLE N/A 11/10/2018    COLONOSCOPY WITH BIOPSY performed by Treva Carpenter MD at [facility]    ND EGD TRANSORAL BIOPSY SINGLE/MULTIPLE N/A 2018    EGD ESOPHAGOGASTRODUODENOSCOPY performed by Charity Davis MD at [address] [facility] Right 2019    HIP TOTAL ARTHROPLASTY ANTERIOR APPROACH - [facility],  C-ARM, performed by Becky Santana DO at [facility]      oesophagitis, candidiasis    UPPER GASTROINTESTINAL ENDOSCOPY  2018    UPPER GASTROINTESTINAL ENDOSCOPY N/A 2018    EGD CONTROL HEMORRHAGE performed by Charity Davis MD at [address] [place] [address] 2021    EGD W/EUS FNA performed by Enriqueta Houston MD at [facility]       Allergies   Allergen Reactions    Aspirin Itching    Trentonflet [Cephalexin]      Tolerated penicillin V 12/13, tolerates cephalexin 6/14         Current Outpatient Medications:     gabapentin (NEURONTIN) 300 MG capsule, Take 1 capsule by mouth 3 times daily for 30 days. Intended supply: 90 days, Disp: 90 capsule, Rfl: 2    oxyCODONE-acetaminophen (PERCOCET) 5-325 MG per tablet, , Disp: , Rfl:     ondansetron (ZOFRAN-ODT) 8 MG TBDP disintegrating tablet, , Disp: , Rfl:     mirtazapine (REMERON) 30 MG tablet, , Disp: , Rfl:     ICY HOT LIDOCAINE PLUS MENTHOL 4-1 % PTCH, , Disp: , Rfl:     imatinib (GLEEVEC) 400 MG chemo tablet, , Disp: , Rfl:     gabapentin (NEURONTIN) 300 MG capsule, , Disp: , Rfl:     famotidine (PEPCID) 20 MG tablet, , Disp: , Rfl:     traMADol (ULTRAM) 50 MG tablet, Take 1 tablet by mouth 2 times daily for 30 days. , Disp: 60 tablet, Rfl: 0    butalbital-acetaminophen-caffeine (FIORICET, ESGIC) -40 MG per tablet, Take 1 tablet by mouth every 12 hours for 14 days, Disp: 28 tablet, Rfl: 0    ferrous sulfate (FE TABS 325) 325 (65 Fe) MG EC tablet, Take 1 tablet by mouth daily, Disp: 90 tablet, Rfl: 3    amitriptyline (ELAVIL) 10 MG tablet, Take 1 tablet by mouth nightly for 14 days, Disp: 14 tablet, Rfl: 0    atorvastatin (LIPITOR) 40 MG tablet, Take 1 tablet by mouth daily for 14 days, Disp: 14 tablet, Rfl: 0    acetaminophen (TYLENOL) 500 MG tablet, Take 2 tablets by mouth every 6 hours as needed for Pain, Disp: 40 tablet, Rfl: 0    mometasone-formoterol (DULERA) 100-5 MCG/ACT inhaler, Inhale 2 puffs into the lungs 2 times daily, Disp: 1 Inhaler, Rfl: 1    lisinopril (PRINIVIL;ZESTRIL) 10 MG tablet, Take 1 tablet by mouth daily, Disp: 30 tablet, Rfl: 0    clopidogrel (PLAVIX) 75 MG tablet, Take 1 tablet by mouth daily, Disp: 30 tablet, Rfl: 3    carvedilol (COREG) 6.25 MG tablet, Take 1 tablet by mouth 2 times daily (with meals), Disp: 60 tablet, Rfl: 3    atorvastatin (LIPITOR) 40 MG tablet, Take 1 tablet by mouth nightly, Disp: 30 tablet, Rfl: 3    apixaban (ELIQUIS) 5 MG TABS tablet, Take 1 tablet by mouth 2 times daily, Disp: 60 tablet, Rfl: 3    amitriptyline (ELAVIL) 10 MG tablet, Take 1 tablet by mouth nightly, Disp: 30 tablet, Rfl: 3    albuterol sulfate HFA (PROVENTIL HFA) 108 (90 Base) MCG/ACT inhaler, Inhale 1-2 puffs into the lungs once for 1 dose, Disp: 1 Inhaler, Rfl: 0    ipratropium-albuterol (DUONEB) 0.5-2.5 (3) MG/3ML SOLN nebulizer solution, Inhale 3 mLs into the lungs every 4 hours as needed for Shortness of Breath, Disp: 360 mL, Rfl: 5    vitamin B-1 100 MG tablet, Take 1 tablet by mouth daily, Disp: 30 tablet, Rfl: 3    vitamin B-12 250 MCG tablet, Take 1 tablet by mouth daily, Disp: 30 tablet, Rfl: 3    Multiple Vitamins-Minerals (THERAPEUTIC MULTIVITAMIN-MINERALS) tablet, Take 1 tablet by mouth daily, Disp: 30 tablet, Rfl: 3    Misc.  Devices [facility]) MISC, 1 Device by Does not apply route once for 1 dose, Disp: 1 each, Rfl: 0    Family History   Problem Relation Age of Onset    Breast Cancer Mother     Heart Disease Father        Social History     Socioeconomic History    Marital status: Single     Spouse name: Not on file    Number of children: Not on file    Years of education: Not on file    Highest education level: Not on file   Occupational History    Not on file   Tobacco Use    Smoking status: Current Some Day Smoker     Packs/day: 0.50     Years: 40.00     Pack years: 20.00     Types: Cigarettes     Start date: 5     Last attempt to quit: 2018     Years since quittin.9    Smokeless tobacco: Never Used   Vaping Use    Vaping Use: Never used   Substance and Sexual Activity    Alcohol use: No     Alcohol/week: 0.0 standard drinks    Drug use: No     Types: IV, Cocaine     Comment: past history    Sexual activity: Not on file   Other Topics Concern    Not on file   Social History Narrative    Not on file     Social Determinants of Health     Financial Resource Strain:    [facility] Difficulty of Paying Living Expenses:    Food Insecurity:     Worried About Running Out of Food in the Last Year:     [address] in the Last Year:    Transportation Needs:     Lack of Transportation (Medical):  Lack of Transportation (Non-Medical):    Physical Activity:     Days of Exercise per Week:     Minutes of Exercise per Session:    Stress:     Feeling of Stress :    Social Connections:     Frequency of Communication with Friends and Family:     Frequency of Social Gatherings with Friends and Family:     Attends Jain Services:     Active Member of Clubs or Organizations:     Attends Club or Organization Meetings:     Marital Status:    Intimate Partner Violence:     Fear of Current or Ex-Partner:     Emotionally Abused:     Physically Abused:     Sexually Abused:        Review of Systems:  Review of Systems   Constitutional: Positive for malaise/fatigue. Negative for chills and fever. Cardiovascular: Negative for chest pain. Respiratory: Positive for cough and shortness of breath. Musculoskeletal: Positive for back pain, joint pain, muscle weakness and myalgias. Gastrointestinal: Negative for constipation. Neurological: Positive for numbness and tingling. Physical Exam:  /81   Pulse 104   LMP 03/18/2015   SpO2 97%     Physical Exam  Cardiovascular:      Rate and Rhythm: Normal rate. Pulmonary:      Effort: Pulmonary effort is normal.   Musculoskeletal:      Lumbar back: Decreased range of motion. Comments: In w/c for visit    Skin:     General: Skin is warm and dry. Neurological:      Mental Status: She is alert and oriented to person, place, and time. Assessment:  Problem List Items Addressed This Visit     Pain of right hip joint - Primary    Relevant Medications    traMADol (ULTRAM) 50 MG tablet             Treatment Plan:  Patient relates current medications are helping the pain.  Patient reports taking pain medications as prescribed, denies obtaining medications from different sources and denies use of illegal drugs. Patient denies side effects from medications like nausea, vomiting, constipation or drowsiness. Patient reports current activities of daily living are possible due to medications and would like to continue them. As always, we encourage daily stretching and strengthening exercises, and recommend minimizing use of pain medications unless patient cannot get through daily activities due to pain. Contract requirements met. Continue opioid therapy.  Script written for tramadol and baclofen   Follow up appointment made for 4 weeks

## 2022-01-03 ENCOUNTER — HOSPITAL ENCOUNTER (OUTPATIENT)
Age: 67
Setting detail: OBSERVATION
Discharge: HOME | End: 2022-01-05
Attending: EMERGENCY MEDICINE | Admitting: EMERGENCY MEDICINE
Payer: MEDICARE

## 2022-01-03 ENCOUNTER — APPOINTMENT (OUTPATIENT)
Dept: CT IMAGING | Age: 67
End: 2022-01-03
Payer: MEDICARE

## 2022-01-03 DIAGNOSIS — K86.89 PANCREATIC MASS: ICD-10-CM

## 2022-01-03 DIAGNOSIS — R10.84 GENERALIZED ABDOMINAL PAIN: Primary | ICD-10-CM

## 2022-01-03 LAB
ABSOLUTE EOS #: 0.04 K/UL (ref 0–0.44)
ABSOLUTE IMMATURE GRANULOCYTE: <0.03 K/UL (ref 0–0.3)
ABSOLUTE LYMPH #: 1.29 K/UL (ref 1.1–3.7)
ABSOLUTE MONO #: 0.43 K/UL (ref 0.1–1.2)
ALBUMIN SERPL-MCNC: 4.1 G/DL (ref 3.5–5.2)
ALBUMIN/GLOBULIN RATIO: 1.1 (ref 1–2.5)
ALP BLD-CCNC: 136 U/L (ref 35–104)
ALT SERPL-CCNC: 25 U/L (ref 5–33)
ANION GAP SERPL CALCULATED.3IONS-SCNC: 10 MMOL/L (ref 9–17)
AST SERPL-CCNC: 36 U/L
BASOPHILS # BLD: 0 % (ref 0–2)
BASOPHILS ABSOLUTE: <0.03 K/UL (ref 0–0.2)
BILIRUB SERPL-MCNC: 0.6 MG/DL (ref 0.3–1.2)
BILIRUBIN DIRECT: 0.26 MG/DL
BILIRUBIN, INDIRECT: 0.34 MG/DL (ref 0–1)
BUN BLDV-MCNC: 9 MG/DL (ref 8–23)
BUN/CREAT BLD: ABNORMAL (ref 9–20)
CALCIUM SERPL-MCNC: 9.2 MG/DL (ref 8.6–10.4)
CHLORIDE BLD-SCNC: 101 MMOL/L (ref 98–107)
CO2: 24 MMOL/L (ref 20–31)
CREAT SERPL-MCNC: 0.48 MG/DL (ref 0.5–0.9)
DIFFERENTIAL TYPE: ABNORMAL
EOSINOPHILS RELATIVE PERCENT: 1 % (ref 1–4)
GFR AFRICAN AMERICAN: >60 ML/MIN
GFR NON-AFRICAN AMERICAN: >60 ML/MIN
GFR SERPL CREATININE-BSD FRML MDRD: ABNORMAL ML/MIN/{1.73_M2}
GFR SERPL CREATININE-BSD FRML MDRD: ABNORMAL ML/MIN/{1.73_M2}
GLOBULIN: ABNORMAL G/DL (ref 1.5–3.8)
GLUCOSE BLD-MCNC: 87 MG/DL (ref 70–99)
HCT VFR BLD CALC: 44.9 % (ref 36.3–47.1)
HEMOGLOBIN: 14 G/DL (ref 11.9–15.1)
IMMATURE GRANULOCYTES: 1 %
LIPASE: 32 U/L (ref 13–60)
LYMPHOCYTES # BLD: 40 % (ref 24–43)
MCH RBC QN AUTO: 32 PG (ref 25.2–33.5)
MCHC RBC AUTO-ENTMCNC: 31.2 G/DL (ref 28.4–34.8)
MCV RBC AUTO: 102.5 FL (ref 82.6–102.9)
MONOCYTES # BLD: 13 % (ref 3–12)
NRBC AUTOMATED: 0 PER 100 WBC
PDW BLD-RTO: 12.6 % (ref 11.8–14.4)
PLATELET # BLD: ABNORMAL K/UL (ref 138–453)
PLATELET ESTIMATE: ABNORMAL
PLATELET, FLUORESCENCE: 273 K/UL (ref 138–453)
PLATELET, IMMATURE FRACTION: 6.2 % (ref 1.1–10.3)
PMV BLD AUTO: ABNORMAL FL (ref 8.1–13.5)
POTASSIUM SERPL-SCNC: 3.9 MMOL/L (ref 3.7–5.3)
RBC # BLD: 4.38 M/UL (ref 3.95–5.11)
RBC # BLD: ABNORMAL 10*6/UL
SARS-COV-2, RAPID: NOT DETECTED
SEG NEUTROPHILS: 45 % (ref 36–65)
SEGMENTED NEUTROPHILS ABSOLUTE COUNT: 1.45 K/UL (ref 1.5–8.1)
SODIUM BLD-SCNC: 135 MMOL/L (ref 135–144)
SPECIMEN DESCRIPTION: NORMAL
TOTAL PROTEIN: 7.8 G/DL (ref 6.4–8.3)
WBC # BLD: 3.2 K/UL (ref 3.5–11.3)
WBC # BLD: ABNORMAL 10*3/UL

## 2022-01-03 PROCEDURE — 6370000000 HC RX 637 (ALT 250 FOR IP): Performed by: EMERGENCY MEDICINE

## 2022-01-03 PROCEDURE — 83690 ASSAY OF LIPASE: CPT

## 2022-01-03 PROCEDURE — 80048 BASIC METABOLIC PNL TOTAL CA: CPT

## 2022-01-03 PROCEDURE — 85025 COMPLETE CBC W/AUTO DIFF WBC: CPT

## 2022-01-03 PROCEDURE — G0378 HOSPITAL OBSERVATION PER HR: HCPCS

## 2022-01-03 PROCEDURE — 76937 US GUIDE VASCULAR ACCESS: CPT

## 2022-01-03 PROCEDURE — 80076 HEPATIC FUNCTION PANEL: CPT

## 2022-01-03 PROCEDURE — 85055 RETICULATED PLATELET ASSAY: CPT

## 2022-01-03 PROCEDURE — 6360000002 HC RX W HCPCS: Performed by: STUDENT IN AN ORGANIZED HEALTH CARE EDUCATION/TRAINING PROGRAM

## 2022-01-03 PROCEDURE — 87635 SARS-COV-2 COVID-19 AMP PRB: CPT

## 2022-01-03 PROCEDURE — 99283 EMERGENCY DEPT VISIT LOW MDM: CPT

## 2022-01-03 PROCEDURE — 96376 TX/PRO/DX INJ SAME DRUG ADON: CPT

## 2022-01-03 PROCEDURE — 93005 ELECTROCARDIOGRAM TRACING: CPT | Performed by: STUDENT IN AN ORGANIZED HEALTH CARE EDUCATION/TRAINING PROGRAM

## 2022-01-03 PROCEDURE — 96374 THER/PROPH/DIAG INJ IV PUSH: CPT

## 2022-01-03 PROCEDURE — 96361 HYDRATE IV INFUSION ADD-ON: CPT

## 2022-01-03 PROCEDURE — 71260 CT THORAX DX C+: CPT

## 2022-01-03 PROCEDURE — 6360000004 HC RX CONTRAST MEDICATION: Performed by: STUDENT IN AN ORGANIZED HEALTH CARE EDUCATION/TRAINING PROGRAM

## 2022-01-03 PROCEDURE — 2580000003 HC RX 258: Performed by: EMERGENCY MEDICINE

## 2022-01-03 RX ORDER — POTASSIUM CHLORIDE 7.45 MG/ML
10 INJECTION INTRAVENOUS PRN
Status: DISCONTINUED | OUTPATIENT
Start: 2022-01-03 | End: 2022-01-05 | Stop reason: HOSPADM

## 2022-01-03 RX ORDER — ATORVASTATIN CALCIUM 80 MG/1
40 TABLET, FILM COATED ORAL NIGHTLY
Status: DISCONTINUED | OUTPATIENT
Start: 2022-01-03 | End: 2022-01-05 | Stop reason: HOSPADM

## 2022-01-03 RX ORDER — SODIUM CHLORIDE 0.9 % (FLUSH) 0.9 %
5-40 SYRINGE (ML) INJECTION PRN
Status: DISCONTINUED | OUTPATIENT
Start: 2022-01-03 | End: 2022-01-05 | Stop reason: HOSPADM

## 2022-01-03 RX ORDER — CARVEDILOL 12.5 MG/1
6.25 TABLET ORAL 2 TIMES DAILY WITH MEALS
Status: DISCONTINUED | OUTPATIENT
Start: 2022-01-03 | End: 2022-01-05 | Stop reason: HOSPADM

## 2022-01-03 RX ORDER — SODIUM CHLORIDE 9 MG/ML
INJECTION, SOLUTION INTRAVENOUS CONTINUOUS
Status: DISCONTINUED | OUTPATIENT
Start: 2022-01-03 | End: 2022-01-05 | Stop reason: HOSPADM

## 2022-01-03 RX ORDER — SODIUM CHLORIDE 0.9 % (FLUSH) 0.9 %
5-40 SYRINGE (ML) INJECTION EVERY 12 HOURS SCHEDULED
Status: DISCONTINUED | OUTPATIENT
Start: 2022-01-03 | End: 2022-01-05 | Stop reason: HOSPADM

## 2022-01-03 RX ORDER — ACETAMINOPHEN 650 MG/1
650 SUPPOSITORY RECTAL EVERY 6 HOURS PRN
Status: DISCONTINUED | OUTPATIENT
Start: 2022-01-03 | End: 2022-01-05 | Stop reason: HOSPADM

## 2022-01-03 RX ORDER — ACETAMINOPHEN 325 MG/1
650 TABLET ORAL EVERY 6 HOURS PRN
Status: DISCONTINUED | OUTPATIENT
Start: 2022-01-03 | End: 2022-01-05 | Stop reason: HOSPADM

## 2022-01-03 RX ORDER — AMITRIPTYLINE HYDROCHLORIDE 10 MG/1
10 TABLET, FILM COATED ORAL NIGHTLY
Status: DISCONTINUED | OUTPATIENT
Start: 2022-01-03 | End: 2022-01-05 | Stop reason: HOSPADM

## 2022-01-03 RX ORDER — LISINOPRIL 10 MG/1
10 TABLET ORAL DAILY
Status: DISCONTINUED | OUTPATIENT
Start: 2022-01-03 | End: 2022-01-05 | Stop reason: HOSPADM

## 2022-01-03 RX ORDER — FAMOTIDINE 20 MG/1
20 TABLET, FILM COATED ORAL 2 TIMES DAILY
Status: DISCONTINUED | OUTPATIENT
Start: 2022-01-03 | End: 2022-01-05 | Stop reason: HOSPADM

## 2022-01-03 RX ORDER — OXYCODONE HYDROCHLORIDE AND ACETAMINOPHEN 5; 325 MG/1; MG/1
2 TABLET ORAL EVERY 4 HOURS PRN
Status: DISCONTINUED | OUTPATIENT
Start: 2022-01-03 | End: 2022-01-05 | Stop reason: HOSPADM

## 2022-01-03 RX ORDER — OXYCODONE HYDROCHLORIDE AND ACETAMINOPHEN 5; 325 MG/1; MG/1
1 TABLET ORAL EVERY 4 HOURS PRN
Status: DISCONTINUED | OUTPATIENT
Start: 2022-01-03 | End: 2022-01-05 | Stop reason: HOSPADM

## 2022-01-03 RX ORDER — ONDANSETRON 2 MG/ML
4 INJECTION INTRAMUSCULAR; INTRAVENOUS EVERY 4 HOURS PRN
Status: DISCONTINUED | OUTPATIENT
Start: 2022-01-03 | End: 2022-01-05 | Stop reason: HOSPADM

## 2022-01-03 RX ORDER — CLOPIDOGREL BISULFATE 75 MG/1
75 TABLET ORAL DAILY
Status: DISCONTINUED | OUTPATIENT
Start: 2022-01-03 | End: 2022-01-05 | Stop reason: HOSPADM

## 2022-01-03 RX ORDER — FENTANYL CITRATE 50 UG/ML
50 INJECTION, SOLUTION INTRAMUSCULAR; INTRAVENOUS ONCE
Status: COMPLETED | OUTPATIENT
Start: 2022-01-03 | End: 2022-01-03

## 2022-01-03 RX ORDER — GABAPENTIN 300 MG/1
300 CAPSULE ORAL 3 TIMES DAILY
Status: DISCONTINUED | OUTPATIENT
Start: 2022-01-03 | End: 2022-01-05 | Stop reason: HOSPADM

## 2022-01-03 RX ORDER — SODIUM CHLORIDE 9 MG/ML
25 INJECTION, SOLUTION INTRAVENOUS PRN
Status: DISCONTINUED | OUTPATIENT
Start: 2022-01-03 | End: 2022-01-05 | Stop reason: HOSPADM

## 2022-01-03 RX ORDER — POTASSIUM CHLORIDE 20 MEQ/1
40 TABLET, EXTENDED RELEASE ORAL PRN
Status: DISCONTINUED | OUTPATIENT
Start: 2022-01-03 | End: 2022-01-05 | Stop reason: HOSPADM

## 2022-01-03 RX ORDER — POLYETHYLENE GLYCOL 3350 17 G/17G
17 POWDER, FOR SOLUTION ORAL DAILY PRN
Status: DISCONTINUED | OUTPATIENT
Start: 2022-01-03 | End: 2022-01-05 | Stop reason: HOSPADM

## 2022-01-03 RX ORDER — FENTANYL CITRATE 50 UG/ML
50 INJECTION, SOLUTION INTRAMUSCULAR; INTRAVENOUS ONCE
Status: DISCONTINUED | OUTPATIENT
Start: 2022-01-03 | End: 2022-01-03

## 2022-01-03 RX ADMIN — AMITRIPTYLINE HYDROCHLORIDE 10 MG: 10 TABLET, FILM COATED ORAL at 21:16

## 2022-01-03 RX ADMIN — CARVEDILOL 6.25 MG: 12.5 TABLET, FILM COATED ORAL at 20:56

## 2022-01-03 RX ADMIN — OXYCODONE HYDROCHLORIDE AND ACETAMINOPHEN 1 TABLET: 5; 325 TABLET ORAL at 20:54

## 2022-01-03 RX ADMIN — IOPAMIDOL 100 ML: 755 INJECTION, SOLUTION INTRAVENOUS at 17:57

## 2022-01-03 RX ADMIN — FENTANYL CITRATE 50 MCG: 50 INJECTION, SOLUTION INTRAMUSCULAR; INTRAVENOUS at 16:59

## 2022-01-03 RX ADMIN — ATORVASTATIN CALCIUM 40 MG: 80 TABLET, FILM COATED ORAL at 20:54

## 2022-01-03 RX ADMIN — LISINOPRIL 10 MG: 10 TABLET ORAL at 20:56

## 2022-01-03 RX ADMIN — GABAPENTIN 300 MG: 300 CAPSULE ORAL at 20:57

## 2022-01-03 RX ADMIN — APIXABAN 5 MG: 5 TABLET, FILM COATED ORAL at 20:55

## 2022-01-03 RX ADMIN — SODIUM CHLORIDE: 9 INJECTION, SOLUTION INTRAVENOUS at 21:16

## 2022-01-03 RX ADMIN — CLOPIDOGREL 75 MG: 75 TABLET, FILM COATED ORAL at 21:02

## 2022-01-03 RX ADMIN — FAMOTIDINE 20 MG: 20 TABLET, FILM COATED ORAL at 20:56

## 2022-01-03 RX ADMIN — FENTANYL CITRATE 50 MCG: 50 INJECTION, SOLUTION INTRAMUSCULAR; INTRAVENOUS at 16:07

## 2022-01-03 ASSESSMENT — ENCOUNTER SYMPTOMS
APNEA: 0
DIARRHEA: 0
EYES NEGATIVE: 1
ABDOMINAL PAIN: 1
SHORTNESS OF BREATH: 0
COUGH: 0
CONSTIPATION: 0
VOMITING: 0

## 2022-01-03 ASSESSMENT — PAIN SCALES - GENERAL
PAINLEVEL_OUTOF10: 0
PAINLEVEL_OUTOF10: 8
PAINLEVEL_OUTOF10: 10

## 2022-01-03 NOTE — ED NOTES
2 failed IV attempts, pt reported that she is a difficult IV start and usually needs IV ultrasound     Maxwell Dinh RN  01/03/22 [id number]

## 2022-01-03 NOTE — ED PROVIDER NOTES
[facility]  FACULTY HANDOFF       Handoff taken on the following patient from prior Attending Physician:  Pt Name: Martin Trini  PCP:  No primary care provider on file. Attestation  I was available and discussed any additional care issues that arose and coordinated the management plans with the resident(s) caring for the patient during my duty period. Any areas of disagreement with resident's documentation of care or procedures are noted on the chart. I was personally present for the key portions of any/all procedures during my duty period. I have documented in the chart those procedures where I was not present during the key portions.          CHIEF COMPLAINT       Chief Complaint   Patient presents with    Hip Pain    Tachycardia         CURRENT MEDICATIONS     Previous Medications  Previous Medications    ACETAMINOPHEN (TYLENOL) 500 MG TABLET    Take 2 tablets by mouth every 6 hours as needed for Pain    ALBUTEROL SULFATE HFA (PROVENTIL HFA) 108 (90 BASE) MCG/ACT INHALER    Inhale 1-2 puffs into the lungs once for 1 dose    AMITRIPTYLINE (ELAVIL) 10 MG TABLET    Take 1 tablet by mouth nightly    AMITRIPTYLINE (ELAVIL) 10 MG TABLET    Take 1 tablet by mouth nightly for 14 days    APIXABAN (ELIQUIS) 5 MG TABS TABLET    Take 1 tablet by mouth 2 times daily    ATORVASTATIN (LIPITOR) 40 MG TABLET    Take 1 tablet by mouth nightly    ATORVASTATIN (LIPITOR) 40 MG TABLET    Take 1 tablet by mouth daily for 14 days    BUTALBITAL-ACETAMINOPHEN-CAFFEINE (FIORICET, ESGIC) -40 MG PER TABLET    Take 1 tablet by mouth every 12 hours for 14 days    CARVEDILOL (COREG) 6.25 MG TABLET    Take 1 tablet by mouth 2 times daily (with meals)    CLOPIDOGREL (PLAVIX) 75 MG TABLET    Take 1 tablet by mouth daily    FAMOTIDINE (PEPCID) 20 MG TABLET        FERROUS SULFATE (FE TABS 325) 325 (65 FE) MG EC TABLET    Take 1 tablet by mouth daily    GABAPENTIN (NEURONTIN) 300 MG CAPSULE    Take 1 capsule by mouth 3 times daily for 30 days. Intended supply: 90 days    GABAPENTIN (NEURONTIN) 300 MG CAPSULE        ICY HOT LIDOCAINE PLUS MENTHOL 4-1 % PTCH        IMATINIB (GLEEVEC) 400 MG CHEMO TABLET        IPRATROPIUM-ALBUTEROL (DUONEB) 0.5-2.5 (3) MG/3ML SOLN NEBULIZER SOLUTION    Inhale 3 mLs into the lungs every 4 hours as needed for Shortness of Breath    LISINOPRIL (PRINIVIL;ZESTRIL) 10 MG TABLET    Take 1 tablet by mouth daily    MIRTAZAPINE (REMERON) 30 MG TABLET        MISC. DEVICES (WHEELCHAIR) MISC    1 Device by Does not apply route once for 1 dose    MOMETASONE-FORMOTEROL (DULERA) 100-5 MCG/ACT INHALER    Inhale 2 puffs into the lungs 2 times daily    MULTIPLE VITAMINS-MINERALS (THERAPEUTIC MULTIVITAMIN-MINERALS) TABLET    Take 1 tablet by mouth daily    ONDANSETRON (ZOFRAN-ODT) 8 MG TBDP DISINTEGRATING TABLET        OXYCODONE-ACETAMINOPHEN (PERCOCET) 5-325 MG PER TABLET        VITAMIN B-1 100 MG TABLET    Take 1 tablet by mouth daily    VITAMIN B-12 250 MCG TABLET    Take 1 tablet by mouth daily       Encounter Medications  Orders Placed This Encounter   Medications    fentaNYL (SUBLIMAZE) injection 50 mcg    DISCONTD: iopamidol (ISOVUE-370) 76 % injection 75 mL    DISCONTD: fentaNYL (SUBLIMAZE) injection 50 mcg    fentaNYL (SUBLIMAZE) injection 50 mcg    iopamidol (ISOVUE-370) 76 % injection 100 mL       ALLERGIES     is allergic to aspirin and keflet [cephalexin].       RECENT VITALS:   Temp: 98.4 °F (36.9 °C),  Pulse: 88, Resp: 16, BP: (!) 171/98    RADIOLOGY:   CT CHEST ABDOMEN PELVIS W CONTRAST    (Results Pending)       LABS:  Labs Reviewed   CBC WITH AUTO DIFFERENTIAL - Abnormal; Notable for the following components:       Result Value    WBC 3.2 (*)     Monocytes 13 (*)     Immature Granulocytes 1 (*)     Segs Absolute 1.45 (*)     All other components within normal limits   BASIC METABOLIC PANEL W/ REFLEX TO MG FOR LOW K - Abnormal; Notable for the following components:    CREATININE 0.48 (*)     All other components within normal limits   HEPATIC FUNCTION PANEL - Abnormal; Notable for the following components:    Alkaline Phosphatase 136 (*)     AST 36 (*)     All other components within normal limits   LIPASE   IMMATURE PLATELET FRACTION     Pancreatic cancer, had chemotherapy and other medications still on 1 month ago, having chronic left hip pain and new worsening right upper quadrant abdominal pain. Oncology recommends admission. CT abdomen pending      PLAN/ TASKS OUTSTANDING     CT abdomen, symptom management, admission    (Please note that portions of this note were completed with a voice recognition program.  Efforts were made to edit the dictations but occasionally words are mis-transcribed. )    Gerri Pop MD,, MD, F.A.C.E.P.   Attending Emergency Physician        Gerri Pop MD  01/03/22 0508

## 2022-01-03 NOTE — ED PROVIDER NOTES
[facility]  Emergency Department Encounter  Emergency Medicine Resident     Pt Name: Zeyad Angeles  MSY:[id number]  [place] 1955  Date of evaluation: 1/3/22  PCP:  No primary care provider on file. CHIEF COMPLAINT       Chief Complaint   Patient presents with    Hip Pain    Tachycardia       HISTORY OF PRESENT ILLNESS  (Location/Symptom, Timing/Onset, Context/Setting, Quality, Duration, ModifyingFactors, Severity.)      Emi Daniels is a 74 y.o. female with PMH of chronic left hip pain, new diagnosed GI/pancreatic malignancy presents to the emergency department for hip pain, right-sided flank and abdominal pain and purported tachycardia. Patient states that she recently moved in with her niece and all of her medication which she has been taking for both pain management and for her malignancy were thrown away in the move. Patient states that she has not had pain relief or chemotherapy for 3 weeks secondary to her meds being thrown away. Patient states that she did not call 911 as she did not think it was an emergency and has not unable to contact her primary care physician because it is difficult for her to get out of the home. Patient not concerned about left hip pain it is at her chronic level, also not concerned about her mild bouts of tachycardia, patient most concerned about the fact that she has worsening right-sided abdominal pain now tracking up towards her chest with a known ligament see that has not been properly treated given that she has lost her chemo.   Patient in the room is nontoxic, speaking full sentences, not complaining of chest pain or shortness of breath, has no changes in her GI or     PAST MEDICAL / SURGICAL / SOCIAL /FAMILY HISTORY      has a past medical history of Appendicitis, Cerebral artery occlusion with cerebral infarction ([address] CHF (congestive heart failure) ([address] Chronic respiratory failure with hypoxia and hypercapnia ([address] Chronic rhinitis, Cigarette smoker, COPD (chronic obstructive pulmonary disease) ([address] Depression, Dysphagia, Essential hypertension, GERD (gastroesophageal reflux disease), Heart failure, diastolic, with acute decompensation ([address] Hepatitis C, chronic ([address] History of smoking at least 1 pack per day for at least 30 years, Hyperlipidemia, Hypertension, Migraine, Moderate COPD (chronic obstructive pulmonary disease) ([address] Multiple transfusions, Neurogenic dysphagia, Osteoarthritis, Pneumonia, Scoliosis, and Substance abuse ([address] No other pertinent PMH on review with patient/guardian. has a past surgical history that includes  section; LEEP (); Upper gastrointestinal endoscopy (); Gastric bypass surgery; Appendectomy (2017); laparoscopic appendectomy (N/A, 3/27/2017); Upper gastrointestinal endoscopy (2018); Upper gastrointestinal endoscopy (N/A, 2018); pr colonoscopy w/biopsy single/multiple (N/A, 11/10/2018); pr egd transoral biopsy single/multiple (N/A, 2018); hip surgery (Right, 2019); Total hip arthroplasty (Right, 2019); Medication Injection (Left, 3/25/2021); Upper gastrointestinal endoscopy (N/A, 2021); other surgical history (Left, 2021); Medication Injection (Left, 2021); and Medication Injection (Left, 2021). No other pertinent PSH on review with patient/guardian. Social History     Socioeconomic History    Marital status: Single     Spouse name: Not on file    Number of children: Not on file    Years of education: Not on file    Highest education level: Not on file   Occupational History    Not on file   Tobacco Use    Smoking status: Current Some Day Smoker     Packs/day: 0.50     Years: 40.00     Pack years: 20.00     Types: Cigarettes     Start date:      Last attempt to quit: 2018     Years since quitting: 3.1    Smokeless tobacco: Never Used   Vaping Use    Vaping Use: Never used   Substance and Sexual Activity    Alcohol use:  No Alcohol/week: 0.0 standard drinks    Drug use: No     Types: IV, Cocaine     Comment: past history    Sexual activity: Not on file   Other Topics Concern    Not on file   Social History Narrative    Not on file     Social Determinants of Health     Financial Resource Strain:     Difficulty of Paying Living Expenses: Not on file   Food Insecurity:     Worried About Running Out of Food in the Last Year: Not on file    Toby of Food in the Last Year: Not on file   Transportation Needs:     Lack of Transportation (Medical): Not on file    Lack of Transportation (Non-Medical): Not on file   Physical Activity:     Days of Exercise per Week: Not on file    Minutes of Exercise per Session: Not on file   Stress:     Feeling of Stress : Not on file   Social Connections:     Frequency of Communication with Friends and Family: Not on file    Frequency of Social Gatherings with Friends and Family: Not on file    Attends Scientologist Services: Not on file    Active Member of [address] [place] [address] [employer] or Organizations: Not on file    Attends Club or Organization Meetings: Not on file    Marital Status: Not on file   Intimate Partner Violence:     Fear of Current or Ex-Partner: Not on file    Emotionally Abused: Not on file    Physically Abused: Not on file    Sexually Abused: Not on file   Housing Stability:     Unable to Pay for Housing in the Last Year: Not on file    Number of [place] in the Last Year: Not on file    Unstable Housing in the Last Year: Not on file       I counseled the patient against using tobacco products. Family History   Problem Relation Age of Onset    Breast Cancer Mother     Heart Disease Father      No other pertinent FamHx on review with patient/guardian. Allergies:  Aspirin and Keflet [cephalexin]    Home Medications:  Prior to Admission medications    Medication Sig Start Date End Date Taking?  Authorizing Provider   gabapentin (NEURONTIN) 300 MG capsule Take 1 capsule by mouth 3 times daily for 30 days.  Intended supply: 90 days 9/27/21 10/27/21  Evi De La Paz, APRN - CNP   oxyCODONE-acetaminophen (PERCOCET) 5-325 MG per tablet  9/17/21   Historical Provider, MD   ondansetron (ZOFRAN-ODT) 8 MG TBDP disintegrating tablet  9/23/21   Historical Provider, MD   mirtazapine (REMERON) 30 MG tablet  9/6/21   Historical Provider, MD   [address] 4-1 % [facility]  9/13/21   Historical Provider, MD   imatinib (GLEEVEC) 400 MG chemo tablet  9/14/21   Historical Provider, MD   gabapentin (NEURONTIN) 300 MG capsule  8/31/21   Historical Provider, MD   famotidine (PEPCID) 20 MG tablet  8/24/21   Historical Provider, MD   butalbital-acetaminophen-caffeine (FIORICET, ESGIC) -40 MG per tablet Take 1 tablet by mouth every 12 hours for 14 days 5/13/21 9/27/21  E Paul Ybarra MD   ferrous sulfate (FE TABS 325) 325 (65 Fe) MG EC tablet Take 1 tablet by mouth daily 5/14/21   E Paul Ybarra MD   amitriptyline (ELAVIL) 10 MG tablet Take 1 tablet by mouth nightly for 14 days 5/13/21 9/27/21  E Paul Ybarra MD   atorvastatin (LIPITOR) 40 MG tablet Take 1 tablet by mouth daily for 14 days 5/13/21 9/27/21  E Paul Ybarra MD   acetaminophen (TYLENOL) 500 MG tablet Take 2 tablets by mouth every 6 hours as needed for Pain 5/7/21   Tara Leung, DO   mometasone-formoterol (DULERA) 100-5 MCG/ACT inhaler Inhale 2 puffs into the lungs 2 times daily 4/6/21   Gissell Larsen DO   lisinopril (PRINIVIL;ZESTRIL) 10 MG tablet Take 1 tablet by mouth daily 4/6/21   Gissell Larsen DO   clopidogrel (PLAVIX) 75 MG tablet Take 1 tablet by mouth daily 4/6/21   Gissell Larsen DO   carvedilol (COREG) 6.25 MG tablet Take 1 tablet by mouth 2 times daily (with meals) 4/6/21   Gissell Larsen,    atorvastatin (LIPITOR) 40 MG tablet Take 1 tablet by mouth nightly 4/6/21   Gissell Larsen, DO   apixaban (ELIQUIS) 5 MG TABS tablet Take 1 tablet by mouth 2 times daily 4/6/21   Gissell Larsen,    amitriptyline (ELAVIL) 10 MG tablet Take 1 tablet by mouth nightly 4/6/21 Ricke Bosworth, DO   albuterol sulfate HFA (PROVENTIL HFA) 108 (90 Base) MCG/ACT inhaler Inhale 1-2 puffs into the lungs once for 1 dose 4/6/21 9/27/21  Ricke Bosworth, DO   amLODIPine (NORVASC) 10 MG tablet Take 1 tablet by mouth daily 2/9/21 2/9/21  Lonnie Ngo MD   [facility] [place]) MISC 1 Device by Does not apply route once for 1 dose 8/5/19 6/6/21  Geovanny Tellez PA-C   ipratropium-albuterol (DUONEB) 0.5-2.5 (3) MG/3ML SOLN nebulizer solution Inhale 3 mLs into the lungs every 4 hours as needed for Shortness of Breath 1/11/19   Omega Pike MD   vitamin B-1 100 MG tablet Take 1 tablet by mouth daily 10/13/18   Marya Lindo MD   vitamin B-12 250 MCG tablet Take 1 tablet by mouth daily 10/13/18   Marya Lindo MD   Multiple Vitamins-Minerals (THERAPEUTIC MULTIVITAMIN-MINERALS) tablet Take 1 tablet by mouth daily 8/12/18   Larry Valera MD       REVIEW OF SYSTEMS    (2-9 systems for level 4, 10 ormore for level 5)      Review of Systems   Constitutional: Negative for activity change, appetite change and fatigue. HENT: Negative for congestion. Eyes: Negative. Respiratory: Negative for apnea, cough and shortness of breath. Cardiovascular: Negative for chest pain. Gastrointestinal: Positive for abdominal pain. Negative for constipation, diarrhea and vomiting. Genitourinary: Negative. Musculoskeletal: Positive for arthralgias, gait problem and myalgias. Skin: Negative. Neurological: Negative for dizziness, facial asymmetry and headaches. Psychiatric/Behavioral: Negative. PHYSICAL EXAM   (up to 7 for level 4, 8 or more for level 5)      INITIAL VITALS:   BP (!) 171/98   Pulse 88   Temp 98.4 °F (36.9 °C)   Resp 16   LMP 03/18/2015   SpO2 100%     Physical Exam  Vitals reviewed. Constitutional:       General: She is not in acute distress. Appearance: She is not ill-appearing. HENT:      Head: Normocephalic and atraumatic.       Nose: Nose normal. Mouth/Throat:      Mouth: Mucous membranes are moist.      Pharynx: Oropharynx is clear. Eyes:      Extraocular Movements: Extraocular movements intact. Conjunctiva/sclera: Conjunctivae normal.      Pupils: Pupils are equal, round, and reactive to light. Cardiovascular:      Rate and Rhythm: Normal rate and regular rhythm. Pulses: Normal pulses. Heart sounds: Normal heart sounds. Pulmonary:      Effort: Pulmonary effort is normal.      Breath sounds: Normal breath sounds. Abdominal:      General: Abdomen is flat. Palpations: Abdomen is soft. Musculoskeletal:      Cervical back: Normal range of motion and neck supple. Neurological:      Mental Status: She is alert.          DIFFERENTIAL  DIAGNOSIS     DDX: GI malignancy, pancreatitis, gastritis, metastasis    PLAN (LABS / IMAGING / EKG):  Orders Placed This Encounter   Procedures    COVID-19, Rapid    CT CHEST ABDOMEN PELVIS W CONTRAST    CBC Auto Differential    Basic Metabolic Panel w/ Reflex to MG    Lipase    Hepatic Function Panel    Immature Platelet Fraction    Basic Metabolic Panel w/ Reflex to MG    CBC auto differential    Diet NPO    Vital signs per unit routine    Telemetry monitoring - 72 hour duration    Notify physician    Up as tolerated    Place intermittent pneumatic compression device    Full Code    Inpatient consult to IV Team    OT eval and treat    PT evaluation and treat    Initiate Oxygen Therapy Protocol    PATIENT STATUS (FROM ED OR OR/PROCEDURAL) Observation       MEDICATIONS ORDERED:  Orders Placed This Encounter   Medications    fentaNYL (SUBLIMAZE) injection 50 mcg    DISCONTD: iopamidol (ISOVUE-370) 76 % injection 75 mL    DISCONTD: fentaNYL (SUBLIMAZE) injection 50 mcg    fentaNYL (SUBLIMAZE) injection 50 mcg    iopamidol (ISOVUE-370) 76 % injection 100 mL    amitriptyline (ELAVIL) tablet 10 mg    apixaban (ELIQUIS) tablet 5 mg    atorvastatin (LIPITOR) tablet 40 mg    carvedilol (COREG) tablet 6.25 mg    clopidogrel (PLAVIX) tablet 75 mg    famotidine (PEPCID) tablet 20 mg    gabapentin (NEURONTIN) capsule 300 mg    lisinopril (PRINIVIL;ZESTRIL) tablet 10 mg    0.9 % sodium chloride infusion    sodium chloride flush 0.9 % injection 5-40 mL    sodium chloride flush 0.9 % injection 5-40 mL    0.9 % sodium chloride infusion    OR Linked Order Group     potassium chloride (KLOR-CON M) extended release tablet 40 mEq     potassium bicarb-citric acid (EFFER-K) effervescent tablet 40 mEq     potassium chloride 10 mEq/100 mL IVPB (Peripheral Line)    ondansetron (ZOFRAN) injection 4 mg    polyethylene glycol (GLYCOLAX) packet 17 g    OR Linked Order Group     acetaminophen (TYLENOL) tablet 650 mg     acetaminophen (TYLENOL) suppository 650 mg    OR Linked Order Group     oxyCODONE-acetaminophen (PERCOCET) 5-325 MG per tablet 1 tablet     oxyCODONE-acetaminophen (PERCOCET) 5-325 MG per tablet 2 tablet           DIAGNOSTIC RESULTS / EMERGENCY DEPARTMENT COURSE / MDM     LABS:  Results for orders placed or performed during the hospital encounter of 01/03/22   COVID-19, Rapid    Specimen: Nasopharyngeal Swab   Result Value Ref Range    Specimen Description . NASOPHARYNGEAL SWAB     SARS-CoV-2, Rapid Not Detected Not Detected   CBC Auto Differential   Result Value Ref Range    WBC 3.2 (L) 3.5 - 11.3 k/uL    RBC 4.38 3.95 - 5.11 m/uL    Hemoglobin 14.0 11.9 - 15.1 g/dL    Hematocrit 44.9 36.3 - 47.1 %    .5 82.6 - 102.9 fL    MCH 32.0 25.2 - 33.5 pg    MCHC 31.2 28.4 - 34.8 g/dL    RDW 12.6 11.8 - 14.4 %    Platelets See Reflexed IPF Result 138 - 453 k/uL    MPV NOT REPORTED 8.1 - 13.5 fL    NRBC Automated 0.0 0.0 per 100 WBC    Differential Type NOT REPORTED     WBC Morphology NOT REPORTED     RBC Morphology NOT REPORTED     Platelet Estimate NOT REPORTED     Seg Neutrophils 45 36 - 65 %    Lymphocytes 40 24 - 43 %    Monocytes 13 (H) 3 - 12 %    Eosinophils % 1 1 - 4 %    Basophils 0 0 - 2 %    Immature Granulocytes 1 (H) 0 %    Segs Absolute 1.45 (L) 1.50 - 8.10 k/uL    Absolute Lymph # 1.29 1.10 - 3.70 k/uL    Absolute Mono # 0.43 0.10 - 1.20 k/uL    Absolute Eos # 0.04 0.00 - 0.44 k/uL    Basophils Absolute <0.03 0.00 - 0.20 k/uL    Absolute Immature Granulocyte <0.03 0.00 - 0.30 k/uL   Basic Metabolic Panel w/ Reflex to MG   Result Value Ref Range    Glucose 87 70 - 99 mg/dL    BUN 9 8 - 23 mg/dL    CREATININE 0.48 (L) 0.50 - 0.90 mg/dL    Bun/Cre Ratio NOT REPORTED 9 - 20    Calcium 9.2 8.6 - 10.4 mg/dL    Sodium 135 135 - 144 mmol/L    Potassium 3.9 3.7 - 5.3 mmol/L    Chloride 101 98 - 107 mmol/L    CO2 24 20 - 31 mmol/L    Anion Gap 10 9 - 17 mmol/L    GFR Non-African American >60 >60 mL/min    GFR African American >60 >60 mL/min    GFR Comment          GFR Staging NOT REPORTED    Lipase   Result Value Ref Range    Lipase 32 13 - 60 U/L   Hepatic Function Panel   Result Value Ref Range    Albumin 4.1 3.5 - 5.2 g/dL    Alkaline Phosphatase 136 (H) 35 - 104 U/L    ALT 25 5 - 33 U/L    AST 36 (H) <32 U/L    Total Bilirubin 0.60 0.3 - 1.2 mg/dL    Bilirubin, Direct 0.26 <0.31 mg/dL    Bilirubin, Indirect 0.34 0.00 - 1.00 mg/dL    Total Protein 7.8 6.4 - 8.3 g/dL    Globulin NOT REPORTED 1.5 - 3.8 g/dL    Albumin/Globulin Ratio 1.1 1.0 - 2.5   Immature Platelet Fraction   Result Value Ref Range    Platelet, Immature Fraction 6.2 1.1 - 10.3 %    Platelet, Fluorescence 273 138 - 453 k/uL         IMPRESSION/MDM/ED COURSE:   74 y.o. female presented with concerns for worsening right-sided abdominal and flank pain with untreated malignancy. Concern for possible metastasis at this time. Will obtain CBC, BMP, LFTs and lipase, repeat CT abdomen pelvis with IV contrast and contact patient's oncology doctor. ED Course as of 01/04/22 0704   Mon Jan 03, 2022   1870 Oncology returned page. Stated the patient was well-known to their office.   Recommended the patient have a CT chest abdomen pelvis and not chest abdomen pelvis as previously ordered. Also stated the patient should be admitted to the hospital regardless of findings so that they can have a thorough evaluation of her tomorrow. Also stated that if there was any GI type involvement that GI should should be consulted so that they may able to evaluate her as well. We will proceed in this fashion. [ES]   [id number] Patient was signed out to Dr. Meek Still  [ES]      ED Course User Index  [ES] Brandee Marte MD         RADIOLOGY:  CT CHEST ABDOMEN PELVIS W CONTRAST   Final Result   Slight decrease in size of the known pancreatic mass. Otherwise no evidence of metastatic disease elsewhere within the   chest/abdomen/pelvis. RECOMMENDATIONS:   Unavailable               EKG  None    All EKG's are interpreted by the Emergency Department Physician who either signs or Co-signs this chart in the absence of a cardiologist.      PROCEDURES:  None    CONSULTS:  IP CONSULT TO IV TEAM        FINAL IMPRESSION      1. Generalized abdominal pain          DISPOSITION / PLAN       DISPOSITION Admitted 01/03/2022 07:10:32 PM          PATIENT REFERREDTO:  No follow-up provider specified.     DISCHARGE MEDICATIONS:  Current Discharge Medication List          Brandee Marte MD  PGY 2  Resident Physician Emergency Medicine  01/04/22 7:04 AM        (Please note that portions of this note were completed with a voice recognition program.Efforts were made to edit the dictations but occasionally words are mis-transcribed.)       Brandee Marte MD  Resident  01/03/22 450 Alanna Carlisle MD  Resident  01/03/22 450 Alanna Carlisle MD  Resident  01/04/22 0730

## 2022-01-03 NOTE — ED NOTES
Pt medicated per order at time, refused to go to CT until pt received more pain medication     Abhijit Pandey RN  01/03/22 2338

## 2022-01-03 NOTE — ED PROVIDER NOTES
[facility] ED  Emergency Department  Emergency Medicine Resident Sign-out     Care of Emi Daniels was assumed from Dr. Efren Barnes and is being seen for Hip Pain and Tachycardia  . The patient's initial evaluation and plan have been discussed with the prior provider who initially evaluated the patient. EMERGENCY DEPARTMENT COURSE / MEDICAL DECISION MAKING:       MEDICATIONS GIVEN:  Orders Placed This Encounter   Medications    fentaNYL (SUBLIMAZE) injection 50 mcg       LABS / RADIOLOGY:     Labs Reviewed   BASIC METABOLIC PANEL W/ REFLEX TO MG FOR LOW K - Abnormal; Notable for the following components:       Result Value    CREATININE 0.48 (*)     All other components within normal limits   HEPATIC FUNCTION PANEL - Abnormal; Notable for the following components:    Alkaline Phosphatase 136 (*)     AST 36 (*)     All other components within normal limits   LIPASE   CBC WITH AUTO DIFFERENTIAL       CT CHEST ABDOMEN PELVIS W CONTRAST   Final Result   Slight decrease in size of the known pancreatic mass. Otherwise no evidence of metastatic disease elsewhere within the   chest/abdomen/pelvis. RECOMMENDATIONS:   Unavailable             RECENT VITALS:     Temp: 98.4 °F (36.9 °C),  Pulse: 88, Resp: 16, BP: (!) 171/98, SpO2: 100 %      This patient is a 74 y.o. Female with to the emergency department with intermittent chest pain, chronic left hip pain and right-sided abdominal pain tracking up to her flank and into her chest.  Patient recently moved from her own home to her niece's home approximate 3 weeks ago at which point during the move patient lost all of her medications. Patient has not had pain meds or chemotherapy as she had previously been prescribed. Patient also was unable to make her oncology appointment secondary to her move and not having transportation.   Patient's major concern of the day was the right-sided abdominal pain tracking up into her chest.  Patient getting abdominal labs and CT chest abdomen pelvis. Oncology was consulted. Stated that the patient should be admitted regardless of findings and that they want to see her in the hospital to make sure that she is able to receive the appropriate chemotherapy as well as other meds. Patient otherwise stable in the room, resting comfortably and in no acute distress. ED Course as of 01/03/22 1918 Mon Jan 03, 2022   1530 Oncology returned page. Stated the patient was well-known to their office. Recommended the patient have a CT chest abdomen pelvis and not chest abdomen pelvis as previously ordered. Also stated the patient should be admitted to the hospital regardless of findings so that they can have a thorough evaluation of her tomorrow. Also stated that if there was any GI type involvement that GI should should be consulted so that they may able to evaluate her as well. We will proceed in this fashion. [ES]   [id number] Patient was signed out to Dr. Walter Cartagena  [ES]      ED Course User Index  [ES] Jaz Murcia MD       OUTSTANDING TASKS / RECOMMENDATIONS:    1. Follow-up chest abdomen pelvis CT  2. Possible GI consult if CT concerning for GI involvement  3. Admit for oncology evaluation     FINAL IMPRESSION:     1. Generalized abdominal pain        DISPOSITION:         DISPOSITION:  []  Discharge   []  Transfer -    [x]  Admission -  Observation   []  Against Medical Advice   []  Eloped   FOLLOW-UP: No follow-up provider specified.    DISCHARGE MEDICATIONS: New Prescriptions    No medications on file          Jacqueline Olea MD  Emergency Medicine Resident  [facility]      Jacqueline Olea MD  Resident  01/03/22 3051

## 2022-01-03 NOTE — ED PROVIDER NOTES
tarry stools either. Assessment/plan: Patient with potentially worsening cancer given no treatment over the last month. Will obtain laboratories as well as CT abdomen/pelvis and pain control and reevaluate after. Will need to discuss with her hematologist/oncologist        EKG Interpretation    Interpreted by emergency department physician    Rhythm: normal sinus   Rate: normal at 87 bpm  Axis: normal  Conduction: normal  ST Segments: no acute change  T Waves: no acute change  Q Waves: no acute change    Clinical Impression:  nonspecific EKG. Critical Care  None    This patient was evaluated in the Emergency Department for symptoms described in the history of present illness. He/she was evaluated in the context of the global COVID-19 pandemic, which necessitated consideration that the patient might be at risk for infection with the SARS-CoV-2 virus that causes COVID-19. Institutional protocols and algorithms that pertain to the evaluation of patients at risk for COVID-19 are in a state of rapid change based on information released by regulatory bodies including the CDC and federal and state organizations. These policies and algorithms were followed during the patient's care in the ED. (Please note that portions of this note were completed with a voice recognition program. Efforts were made to edit the dictations but occasionally words are mis-transcribed.  Whenever words are used in this note in any gender, they shall be construed as though they were used in the gender appropriate to the circumstances; and whenever words are used in this note in the singular or plural form, they shall be construed as though they were used in the form appropriate to the circumstances.)    MD Donald Mcgraw  Attending Emergency Medicine Physician           Khari Marcelino MD  01/03/22 Kandice Puente MD  01/03/22 0006

## 2022-01-04 LAB
ABSOLUTE EOS #: 0.1 K/UL (ref 0–0.44)
ABSOLUTE IMMATURE GRANULOCYTE: <0.03 K/UL (ref 0–0.3)
ABSOLUTE LYMPH #: 1.59 K/UL (ref 1.1–3.7)
ABSOLUTE MONO #: 0.56 K/UL (ref 0.1–1.2)
ANION GAP SERPL CALCULATED.3IONS-SCNC: 9 MMOL/L (ref 9–17)
BASOPHILS # BLD: 1 % (ref 0–2)
BASOPHILS ABSOLUTE: <0.03 K/UL (ref 0–0.2)
BUN BLDV-MCNC: 9 MG/DL (ref 8–23)
BUN/CREAT BLD: ABNORMAL (ref 9–20)
CALCIUM SERPL-MCNC: 8.2 MG/DL (ref 8.6–10.4)
CHLORIDE BLD-SCNC: 110 MMOL/L (ref 98–107)
CO2: 18 MMOL/L (ref 20–31)
CREAT SERPL-MCNC: 0.54 MG/DL (ref 0.5–0.9)
DIFFERENTIAL TYPE: ABNORMAL
EOSINOPHILS RELATIVE PERCENT: 3 % (ref 1–4)
GFR AFRICAN AMERICAN: >60 ML/MIN
GFR NON-AFRICAN AMERICAN: >60 ML/MIN
GFR SERPL CREATININE-BSD FRML MDRD: ABNORMAL ML/MIN/{1.73_M2}
GFR SERPL CREATININE-BSD FRML MDRD: ABNORMAL ML/MIN/{1.73_M2}
GLUCOSE BLD-MCNC: 83 MG/DL (ref 70–99)
HCT VFR BLD CALC: 39 % (ref 36.3–47.1)
HEMOGLOBIN: 12.6 G/DL (ref 11.9–15.1)
IMMATURE GRANULOCYTES: 1 %
LYMPHOCYTES # BLD: 44 % (ref 24–43)
MCH RBC QN AUTO: 32.9 PG (ref 25.2–33.5)
MCHC RBC AUTO-ENTMCNC: 32.3 G/DL (ref 28.4–34.8)
MCV RBC AUTO: 101.8 FL (ref 82.6–102.9)
MONOCYTES # BLD: 16 % (ref 3–12)
NRBC AUTOMATED: 0 PER 100 WBC
PDW BLD-RTO: 12.7 % (ref 11.8–14.4)
PLATELET # BLD: ABNORMAL K/UL (ref 138–453)
PLATELET ESTIMATE: ABNORMAL
PLATELET, FLUORESCENCE: 168 K/UL (ref 138–453)
PLATELET, IMMATURE FRACTION: 4.3 % (ref 1.1–10.3)
PMV BLD AUTO: ABNORMAL FL (ref 8.1–13.5)
POTASSIUM SERPL-SCNC: 4.2 MMOL/L (ref 3.7–5.3)
RBC # BLD: 3.83 M/UL (ref 3.95–5.11)
RBC # BLD: ABNORMAL 10*6/UL
SEG NEUTROPHILS: 36 % (ref 36–65)
SEGMENTED NEUTROPHILS ABSOLUTE COUNT: 1.3 K/UL (ref 1.5–8.1)
SODIUM BLD-SCNC: 137 MMOL/L (ref 135–144)
WBC # BLD: 3.6 K/UL (ref 3.5–11.3)
WBC # BLD: ABNORMAL 10*3/UL

## 2022-01-04 PROCEDURE — 97116 GAIT TRAINING THERAPY: CPT

## 2022-01-04 PROCEDURE — 2580000003 HC RX 258: Performed by: EMERGENCY MEDICINE

## 2022-01-04 PROCEDURE — 6370000000 HC RX 637 (ALT 250 FOR IP): Performed by: STUDENT IN AN ORGANIZED HEALTH CARE EDUCATION/TRAINING PROGRAM

## 2022-01-04 PROCEDURE — G0378 HOSPITAL OBSERVATION PER HR: HCPCS

## 2022-01-04 PROCEDURE — 97166 OT EVAL MOD COMPLEX 45 MIN: CPT

## 2022-01-04 PROCEDURE — 96361 HYDRATE IV INFUSION ADD-ON: CPT

## 2022-01-04 PROCEDURE — 96375 TX/PRO/DX INJ NEW DRUG ADDON: CPT

## 2022-01-04 PROCEDURE — 85025 COMPLETE CBC W/AUTO DIFF WBC: CPT

## 2022-01-04 PROCEDURE — 80048 BASIC METABOLIC PNL TOTAL CA: CPT

## 2022-01-04 PROCEDURE — 97535 SELF CARE MNGMENT TRAINING: CPT

## 2022-01-04 PROCEDURE — 97530 THERAPEUTIC ACTIVITIES: CPT

## 2022-01-04 PROCEDURE — 97162 PT EVAL MOD COMPLEX 30 MIN: CPT

## 2022-01-04 PROCEDURE — 6370000000 HC RX 637 (ALT 250 FOR IP): Performed by: EMERGENCY MEDICINE

## 2022-01-04 PROCEDURE — 85055 RETICULATED PLATELET ASSAY: CPT

## 2022-01-04 PROCEDURE — 36415 COLL VENOUS BLD VENIPUNCTURE: CPT

## 2022-01-04 PROCEDURE — 6360000002 HC RX W HCPCS: Performed by: STUDENT IN AN ORGANIZED HEALTH CARE EDUCATION/TRAINING PROGRAM

## 2022-01-04 PROCEDURE — 99254 IP/OBS CNSLTJ NEW/EST MOD 60: CPT | Performed by: INTERNAL MEDICINE

## 2022-01-04 RX ORDER — MORPHINE SULFATE 4 MG/ML
4 INJECTION, SOLUTION INTRAMUSCULAR; INTRAVENOUS ONCE
Status: COMPLETED | OUTPATIENT
Start: 2022-01-04 | End: 2022-01-04

## 2022-01-04 RX ORDER — BUTALBITAL, ACETAMINOPHEN AND CAFFEINE 50; 325; 40 MG/1; MG/1; MG/1
1 TABLET ORAL EVERY 4 HOURS PRN
Status: DISCONTINUED | OUTPATIENT
Start: 2022-01-04 | End: 2022-01-05 | Stop reason: HOSPADM

## 2022-01-04 RX ADMIN — CARVEDILOL 6.25 MG: 12.5 TABLET, FILM COATED ORAL at 08:51

## 2022-01-04 RX ADMIN — GABAPENTIN 300 MG: 300 CAPSULE ORAL at 20:59

## 2022-01-04 RX ADMIN — FAMOTIDINE 20 MG: 20 TABLET, FILM COATED ORAL at 20:59

## 2022-01-04 RX ADMIN — OXYCODONE HYDROCHLORIDE AND ACETAMINOPHEN 2 TABLET: 5; 325 TABLET ORAL at 21:02

## 2022-01-04 RX ADMIN — APIXABAN 5 MG: 5 TABLET, FILM COATED ORAL at 20:58

## 2022-01-04 RX ADMIN — ATORVASTATIN CALCIUM 40 MG: 80 TABLET, FILM COATED ORAL at 20:59

## 2022-01-04 RX ADMIN — BUTALBITAL, ACETAMINOPHEN AND CAFFEINE 1 TABLET: 50; 325; 40 TABLET ORAL at 08:51

## 2022-01-04 RX ADMIN — GABAPENTIN 300 MG: 300 CAPSULE ORAL at 08:52

## 2022-01-04 RX ADMIN — CLOPIDOGREL 75 MG: 75 TABLET, FILM COATED ORAL at 08:52

## 2022-01-04 RX ADMIN — CARVEDILOL 6.25 MG: 12.5 TABLET, FILM COATED ORAL at 17:04

## 2022-01-04 RX ADMIN — MORPHINE SULFATE 4 MG: 4 INJECTION INTRAVENOUS at 08:51

## 2022-01-04 RX ADMIN — SODIUM CHLORIDE, PRESERVATIVE FREE 10 ML: 5 INJECTION INTRAVENOUS at 08:52

## 2022-01-04 RX ADMIN — GABAPENTIN 300 MG: 300 CAPSULE ORAL at 14:24

## 2022-01-04 RX ADMIN — SODIUM CHLORIDE: 9 INJECTION, SOLUTION INTRAVENOUS at 14:00

## 2022-01-04 RX ADMIN — APIXABAN 5 MG: 5 TABLET, FILM COATED ORAL at 08:51

## 2022-01-04 RX ADMIN — LISINOPRIL 10 MG: 10 TABLET ORAL at 08:51

## 2022-01-04 RX ADMIN — FAMOTIDINE 20 MG: 20 TABLET, FILM COATED ORAL at 08:52

## 2022-01-04 RX ADMIN — AMITRIPTYLINE HYDROCHLORIDE 10 MG: 10 TABLET, FILM COATED ORAL at 20:58

## 2022-01-04 RX ADMIN — OXYCODONE HYDROCHLORIDE AND ACETAMINOPHEN 2 TABLET: 5; 325 TABLET ORAL at 14:27

## 2022-01-04 ASSESSMENT — PAIN SCALES - GENERAL
PAINLEVEL_OUTOF10: 6
PAINLEVEL_OUTOF10: 4
PAINLEVEL_OUTOF10: 8
PAINLEVEL_OUTOF10: 5
PAINLEVEL_OUTOF10: 7
PAINLEVEL_OUTOF10: 6
PAINLEVEL_OUTOF10: 8
PAINLEVEL_OUTOF10: 8

## 2022-01-04 ASSESSMENT — PAIN DESCRIPTION - ONSET
ONSET: ON-GOING
ONSET: GRADUAL
ONSET: ON-GOING

## 2022-01-04 ASSESSMENT — PAIN DESCRIPTION - DESCRIPTORS
DESCRIPTORS: ACHING;DISCOMFORT
DESCRIPTORS: BURNING

## 2022-01-04 ASSESSMENT — PAIN DESCRIPTION - PAIN TYPE
TYPE: ACUTE PAIN
TYPE: CHRONIC PAIN;ACUTE PAIN
TYPE: CHRONIC PAIN

## 2022-01-04 ASSESSMENT — PAIN DESCRIPTION - LOCATION
LOCATION: ABDOMEN;BACK
LOCATION: ABDOMEN;BACK
LOCATION: ABDOMEN;HIP
LOCATION: ABDOMEN
LOCATION: ABDOMEN;BACK

## 2022-01-04 ASSESSMENT — PAIN DESCRIPTION - ORIENTATION
ORIENTATION: RIGHT;LOWER
ORIENTATION: RIGHT
ORIENTATION: RIGHT;LOWER
ORIENTATION: RIGHT;LOWER

## 2022-01-04 ASSESSMENT — PAIN DESCRIPTION - FREQUENCY
FREQUENCY: CONTINUOUS
FREQUENCY: CONTINUOUS
FREQUENCY: INTERMITTENT
FREQUENCY: CONTINUOUS

## 2022-01-04 ASSESSMENT — PAIN DESCRIPTION - PROGRESSION
CLINICAL_PROGRESSION: NOT CHANGED

## 2022-01-04 NOTE — PROGRESS NOTES
Comprehensive Nutrition Assessment    Type and Reason for Visit:  Initial,Positive Nutrition Screen (Unintentional Weight Loss - Pancreatitic Cancer, reports was on Ensures)    Nutrition Recommendations/Plan: Continue current diet with Ensure Enlive oral supplements at all meals. Encourage/monitor PO intakes as tolerated. Will monitor labs and plan of care. Obtain current bed scale weight as able. Nutrition Assessment:  Consulted for Unintentional Weight Loss - Pancreatitic Cancer. Pt previously on Ensure supplements. Admitted with hip, right-sided flank, and abdominal pain. PMH includes: CHF, COPD, h/o dysphagia, HTN, h/o pancreatic cancer and chemotherapy. Pt reports eating poorly before admission. Reports drinking Ensure sometimes but not always as it is expensive. Pt reports most recent weight of 91 lb. States she was up to 100 lb but then lost weight. Per chart review, fluctuating weights noted. RN reports pt ate 100% of lunch today. Pt reports she likes strawberry Ensure supplements. Labs/Meds reviewed. Malnutrition Assessment:  Malnutrition Status: Moderate malnutrition    Context:  Chronic Illness     Findings of the 6 clinical characteristics of malnutrition:  Energy Intake:  7 - 75% or less estimated energy requirements for 1 month or longer  Weight Loss:  Unable to assess - Weight fluctuations per chart review/pt noted.      Body Fat Loss:  1 - Mild body fat loss Orbital,Triceps   Muscle Mass Loss:  1 - Mild muscle mass loss Temples (temporalis),Clavicles (pectoralis & deltoids)  Fluid Accumulation:  No significant fluid accumulation   Strength:  Not Performed    Estimated Daily Nutrient Needs:  Energy (kcal):  30-35 kcal/kg = 1169-0246 kcals/day; Weight Used for Energy Requirements:  Ideal     Protein (g):  1.5 gm/kg = 65 gm pro/day; Weight Used for Protein Requirements:  Ideal        Fluid (ml/day):  0712-9717 mL/day or per MD; Method Used for Fluid Requirements:  1 ml/kcal Nutrition Related Findings:  Labs/Meds reviewed. Wounds:  None       Current Nutrition Therapies:    ADULT ORAL NUTRITION SUPPLEMENT; Breakfast, Lunch, HS Snack, Dinner; Standard High Calorie/High Protein Oral Supplement  ADULT DIET; Regular    Anthropometric Measures:  · Height: 4' 11\" (149.9 cm)  · Current Body Weight: 91 lb (41.3 kg) (per pt report)   · Usual Body Weight:  (80-99 lb per chart review)     · Ideal Body Weight: 95 lbs; % Ideal Body Weight 95.8 %   · BMI: 18.4  · BMI Categories: Underweight (BMI less than 18.5)       Nutrition Diagnosis:   · Inadequate oral intake related to catabolic illness,pain (appetite) as evidenced by poor intake prior to admission,weight loss (need for ONS)    Nutrition Interventions:   Food and/or Nutrient Delivery:  Continue Current Diet,Continue Oral Nutrition Supplement (Provide Ensure oral supplements with all meals.)  Nutrition Education/Counseling:  No recommendation at this time   Coordination of Nutrition Care:  Continue to monitor while inpatient    Goals:  Meet % of estimated nutrition needs.        Nutrition Monitoring and Evaluation:   Behavioral-Environmental Outcomes:  None Identified   Food/Nutrient Intake Outcomes:  Food and Nutrient Intake,Supplement Intake  Physical Signs/Symptoms Outcomes:  Biochemical Data,GI Status,Hemodynamic Status,Nutrition Focused Physical Findings,Skin,Weight     Discharge Planning:    Continue current diet,Continue Oral Nutrition Supplement     Electronically signed by Dustin Mcdaniel RD, LD on 1/4/22 at 3:55 PM EST    Contact: [phone]

## 2022-01-04 NOTE — H&P
[facility]  CDU / OBSERVATION eNCOUnter  Resident Note     Pt Name: Dilia Carvalho  MRN: [id number]  [place] 1955  Date of evaluation: 1/4/22  Patient's PCP is : No primary care provider on file. CHIEF COMPLAINT       Chief Complaint   Patient presents with    Hip Pain    Tachycardia     HISTORY OF PRESENT ILLNESS    Emi Daniels is a 74 y.o. female who presents with acute on chronic abdominal pain that has been worsening over the past few days. 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 Patient recently moved houses and lost her medications so she has not been taking her chemotherapy. Patient thought that because of this worsening pain that she may also have growing of her tumor. In the emergency department they did a CT of her abdomen which demonstrated no changes to the size of her tumor. An oncologist recommended admission for further oncology evaluation and possible surgical evaluation as well. Patient today reports that she is having a headache for which she typically takes Fioricet that she is continuing to have severe abdominal pain. She also does report pain in her hip, she has seen a surgeon for this and they told her that if she were to undergo surgery she would likely not wake up.     Location/Symptom: Abdominal pain  Timing/Onset: Ongoing for weeks, acutely worsened yesterday  Provocation: Pancreatic cancer  Quality: Cramping  Radiation: Does not radiate  Severity: 10/10  Timing/Duration: Constant, progressively worsening  Modifying Factors: Morphine improves pain    REVIEW OF SYSTEMS     Bold positive  General ROS - No fevers, No malaise, weight loss  Ophthalmic ROS - No discharge, No changes in vision  ENT ROS -  No sore throat, No rhinorrhea,   Respiratory ROS - no shortness of breath, no cough, no  wheezing  Cardiovascular ROS - No chest pain, no dyspnea on exertion  Gastrointestinal ROS - No abdominal pain, no nausea or vomiting, no change in bowel habits, no black or bloody stools  Genito-Urinary ROS - No dysuria, trouble voiding, or hematuria  Musculoskeletal ROS - No myalgias, No arthalgias  Neurological ROS - No headache, no dizziness/lightheadedness, No focal weakness, no loss of sensation  Dermatological ROS - No lesions, No rash     (PQRS) Advance directives on face sheet per hospital policy. No change unless specifically mentioned in chart    [address]    has a past medical history of Appendicitis, Cerebral artery occlusion with cerebral infarction ([address] CHF (congestive heart failure) ([address] Chronic respiratory failure with hypoxia and hypercapnia (HCC), Chronic rhinitis, Cigarette smoker, COPD (chronic obstructive pulmonary disease) ([address] Depression, Dysphagia, Essential hypertension, GERD (gastroesophageal reflux disease), Heart failure, diastolic, with acute decompensation ([address] Hepatitis C, chronic ([address] History of smoking at least 1 pack per day for at least 30 years, Hyperlipidemia, Hypertension, Migraine, Moderate COPD (chronic obstructive pulmonary disease) ([address] Multiple transfusions, Neurogenic dysphagia, Osteoarthritis, Pneumonia, Scoliosis, and Substance abuse ([address] I have reviewed the past medical history with the patient and it is pertinent to this complaint. SURGICAL HISTORY      has a past surgical history that includes  section; LEEP (); Upper gastrointestinal endoscopy (); Gastric bypass surgery; Appendectomy (2017); laparoscopic appendectomy (N/A, 3/27/2017); Upper gastrointestinal endoscopy (2018); Upper gastrointestinal endoscopy (N/A, 2018); pr colonoscopy w/biopsy single/multiple (N/A, 11/10/2018); pr egd transoral biopsy single/multiple (N/A, 2018); hip surgery (Right, 2019); Total hip arthroplasty (Right, 2019); Medication Injection (Left, 3/25/2021);  Upper gastrointestinal endoscopy (N/A, 2021); other surgical history (Left, 2021); Medication Injection (Left, 2021); and Medication Injection (Left, 2021). I have reviewed and agree with Surgical History entered and it is pertinent to this complaint. CURRENT MEDICATIONS     butalbital-acetaminophen-caffeine (FIORICET, ESGIC) per tablet 1 tablet, Q4H PRN  amitriptyline (ELAVIL) tablet 10 mg, Nightly  apixaban (ELIQUIS) tablet 5 mg, BID  atorvastatin (LIPITOR) tablet 40 mg, Nightly  carvedilol (COREG) tablet 6.25 mg, BID WC  clopidogrel (PLAVIX) tablet 75 mg, Daily  famotidine (PEPCID) tablet 20 mg, BID  gabapentin (NEURONTIN) capsule 300 mg, TID  lisinopril (PRINIVIL;ZESTRIL) tablet 10 mg, Daily  0.9 % sodium chloride infusion, Continuous  sodium chloride flush 0.9 % injection 5-40 mL, 2 times per day  sodium chloride flush 0.9 % injection 5-40 mL, PRN  0.9 % sodium chloride infusion, PRN  potassium chloride (KLOR-CON M) extended release tablet 40 mEq, PRN   Or  potassium bicarb-citric acid (EFFER-K) effervescent tablet 40 mEq, PRN   Or  potassium chloride 10 mEq/100 mL IVPB (Peripheral Line), PRN  ondansetron (ZOFRAN) injection 4 mg, Q4H PRN  polyethylene glycol (GLYCOLAX) packet 17 g, Daily PRN  acetaminophen (TYLENOL) tablet 650 mg, Q6H PRN   Or  acetaminophen (TYLENOL) suppository 650 mg, Q6H PRN  oxyCODONE-acetaminophen (PERCOCET) 5-325 MG per tablet 1 tablet, Q4H PRN   Or  oxyCODONE-acetaminophen (PERCOCET) 5-325 MG per tablet 2 tablet, Q4H PRN        All medication charted and reviewed. ALLERGIES     is allergic to aspirin and keflet [cephalexin]. FAMILY HISTORY     She indicated that her mother is . She indicated that her father is . family history includes Breast Cancer in her mother; Heart Disease in her father. The patient denies any pertinent family history. I have reviewed and agree with the family history entered.   I have reviewed the Family History and it is not significant to the case    SOCIAL HISTORY      reports WITH CONTRAST 1/3/2022 4:47 pm TECHNIQUE: CT of the chest, abdomen and pelvis was performed with the administration of intravenous contrast. Multiplanar reformatted images are provided for review. Dose modulation, iterative reconstruction, and/or weight based adjustment of the mA/kV was utilized to reduce the radiation dose to as low as reasonably achievable. COMPARISON: 05/19/2021, CT angiogram chest 11/13/2018 HISTORY: ORDERING SYSTEM PROVIDED HISTORY: history of malignancy and has not been taking her chemo TECHNOLOGIST PROVIDED HISTORY: history of malignancy and has not been taking her chemo Decision Support Exception - unselect if not a suspected or confirmed emergency medical condition->Emergency Medical Condition (MA) Reason for Exam: pain,tachycardia,malignancy FINDINGS: Chest: Mediastinum: Aorta noted diffuse atherosclerotic disease. Cardiomegaly. Coronary disease. No effusion. No suspicious adenopathy identified. Lungs/pleura: Emphysematous change. No effusion or pneumothorax. No suspicious nodule. Mild scarring left lung base. Soft Tissues/Bones: Multilevel degenerate change. Abdomen/Pelvis: Organs: Fatty liver. Prominence of the common bile duct again identified 6 mm. Otherwise the liver, gallbladder, spleen, adrenal glands, and kidneys are unremarkable. There is redemonstration of the heterogeneous enhancing mass along the inferior margin of the pancreatic body slightly smaller in size measuring 2.2 by 1.5 x 2.2 cm in size. GI/Bowel: Slight thickening the gastric antrum related to underdistention. Mild retained stool throughout colon. No bowel obstruction. Colonic diverticulosis noted. Pelvis: Beam hardening artifact arising from a right hip arthroplasty challenge evaluation of the pelvic contents. Bladder, uterus, and adnexal regions are grossly unremarkable as visualized. Sub lumbar confusion uterus suggesting fibroids. Peritoneum/Retroperitoneum: No free fluid.   Aortic vascular calcifications. Aorta is nonaneurysmal.  No bulky adenopathy. Bones/Soft Tissues: Dextrocurvature of the thoracolumbar junction. Multilevel degenerate changes. Advanced degenerate changes left hip with flattening of the femoral head noted. There is subchondral cystic changes identified involving femoral head and the acetabulum. Bartholin's cyst on the right again identified not substantially changed. Scattered subcentimeter lesion sidentified involving the spine appears stable dating back to 2019. Slight decrease in size of the known pancreatic mass. Otherwise no evidence of metastatic disease elsewhere within the chest/abdomen/pelvis. RECOMMENDATIONS: Unavailable       LABS:  I have reviewed and interpreted all available lab results.   Labs Reviewed   CBC WITH AUTO DIFFERENTIAL - Abnormal; Notable for the following components:       Result Value    WBC 3.2 (*)     Monocytes 13 (*)     Immature Granulocytes 1 (*)     Segs Absolute 1.45 (*)     All other components within normal limits   BASIC METABOLIC PANEL W/ REFLEX TO MG FOR LOW K - Abnormal; Notable for the following components:    CREATININE 0.48 (*)     All other components within normal limits   HEPATIC FUNCTION PANEL - Abnormal; Notable for the following components:    Alkaline Phosphatase 136 (*)     AST 36 (*)     All other components within normal limits   BASIC METABOLIC PANEL W/ REFLEX TO MG FOR LOW K - Abnormal; Notable for the following components:    Calcium 8.2 (*)     Chloride 110 (*)     CO2 18 (*)     All other components within normal limits   CBC WITH AUTO DIFFERENTIAL - Abnormal; Notable for the following components:    RBC 3.83 (*)     Lymphocytes 44 (*)     Monocytes 16 (*)     Immature Granulocytes 1 (*)     Segs Absolute 1.30 (*)     All other components within normal limits   COVID-19, RAPID   LIPASE   IMMATURE PLATELET FRACTION   IMMATURE PLATELET FRACTION       CDU IMPRESSION / PLAN      Emi Daniels is a 74 y.o. female who presents with     · Abdominal pain thought to be secondary to pancreatic GIST  · We will continue pain control with opioids  · Appreciate oncology recommendations  · Spoke with general surgery residents, need endocrine surgeon recommended Dr. Lizarraga [facility] or Dr. Mady Sabillon [address] · Reached out to Dr. Mady Sabillon via [employer], awaiting response. · Headaches, typical  · None Fioricet, will reevaluate later  · Weight loss  · Patient reports significant unintentional weight loss since cancer diagnosis  · What he injured on this hospitalization  · Was not taking ensures at home, will restart here in the hospital  · Consultation to dietitian  · Continue home medications and pain control  · Monitor vitals, labs, and imaging  · DISPO: pending consults and clinical improvement    CONSULTS:    IP CONSULT TO IV TEAM  IP CONSULT TO ONCOLOGY  IP CONSULT TO GENERAL SURGERY  IP CONSULT TO DIETITIAN    PROCEDURES:  Not indicated     PATIENT REFERRED TO:    No follow-up provider specified. --  Sarahi Zurita DO   Emergency Medicine Resident     This dictation was generated by voice recognition computer software. Although all attempts are made to edit the dictation for accuracy, there may be errors in the transcription that are not intended.

## 2022-01-04 NOTE — PROGRESS NOTES
Occupational Therapy   Occupational Therapy Initial Assessment  Date: 2022   Patient Name: Charles Fournier  MRN: [id number]     : 1955    Date of Service: 2022    Discharge Recommendations:  Patient would benefit from continued therapy after discharge. Pt is currently unsafe to return to prior living arrangement without 24/ supervision to safely engage in all aspects of ADLs/IADLs and functional mobility tasks. Will continue to assess throughout pt's acute stay. OT Equipment Recommendations  Equipment Needed: Yes  Mobility Devices: ADL Assistive Devices  ADL Assistive Devices: Shower Chair with back;Grab Bars - shower;Long-handled Sponge    Assessment   Performance deficits / Impairments: Decreased ADL status; Decreased safe awareness;Decreased functional mobility ; Decreased endurance;Decreased high-level IADLs  Assessment: Pt supine in bed upon arrival. Pt complete supine to sit transfer with supervision. Pt demonstrated good sitting balance at EOB unsupported with supervision. Pt completed sit<>stand transfer and functional mobility to/from bathroom with SBA and use of quad cane. No LOB/unsteadiness; however, pt reports fatigue with increased activity. Engaged in toileting tasks with SBA. SBA for clothing management standing, supervision for pericare seated. Completed hand hygiene IND. Pt limited due to decreased endurance. Pt denies completing further functional activities with OT despite max encouragement. Pt would benefit from continued occupational therapy services throughout her acute stay to address the above noted deficits through skilled occupational therapy intervention for promotion of increased independence throughout ADLs/IADLs and functional mobility tasks.    Prognosis: Good  Decision Making: Medium Complexity  OT Education: OT Role;Plan of Care  Patient Education: Pt educated on OT role, OT POC, activity promotion, importance of EOB/OOB activity, DME equipment- good return  REQUIRES OT FOLLOW UP: Yes  Activity Tolerance  Activity Tolerance: Treatment limited secondary to agitation  Activity Tolerance: Pt appears to be self limiting. Initially pt declined to engage in functional tasks d/t just receiving PT and stated \"I just did all of this\". OT reassured pt of importance and purpose of OT services. Safety Devices  Safety Devices in place: Yes  Type of devices: Left in bed;Call light within reach;Gait belt  Restraints  Initially in place: No           Patient Diagnosis(es): The encounter diagnosis was Generalized abdominal pain. has a past medical history of Appendicitis, Cerebral artery occlusion with cerebral infarction ([address] CHF (congestive heart failure) ([address] Chronic respiratory failure with hypoxia and hypercapnia (HCC), Chronic rhinitis, Cigarette smoker, COPD (chronic obstructive pulmonary disease) ([address] Depression, Dysphagia, Essential hypertension, GERD (gastroesophageal reflux disease), Heart failure, diastolic, with acute decompensation ([address] Hepatitis C, chronic ([address] History of smoking at least 1 pack per day for at least 30 years, Hyperlipidemia, Hypertension, Migraine, Moderate COPD (chronic obstructive pulmonary disease) ([address] Multiple transfusions, Neurogenic dysphagia, Osteoarthritis, Pneumonia, Scoliosis, and Substance abuse ([address] has a past surgical history that includes  section; LEEP (); Upper gastrointestinal endoscopy (); Gastric bypass surgery; Appendectomy (2017); laparoscopic appendectomy (N/A, 3/27/2017); Upper gastrointestinal endoscopy (2018); Upper gastrointestinal endoscopy (N/A, 2018); pr colonoscopy w/biopsy single/multiple (N/A, 11/10/2018); pr egd transoral biopsy single/multiple (N/A, 2018); hip surgery (Right, 2019); Total hip arthroplasty (Right, 2019); Medication Injection (Left, 3/25/2021); Upper gastrointestinal endoscopy (N/A, 2021); other surgical history (Left, 2021);  Medication Injection (Left, 7/12/2021); and Medication Injection (Left, 7/12/2021). Restrictions  Restrictions/Precautions  Restrictions/Precautions: Fall Risk,General Precautions,Up as Tolerated  Required Braces or Orthoses?: No    Subjective   General  Patient assessed for rehabilitation services?: Yes  Response to previous treatment: Patient with no complaints from previous session  Family / Caregiver Present: No  General Comment  Comments: RN ok'd for OT carole this AM. Pt agreeable to session, pleasent throughout. Pt required significant encouragment for participation at beginning of session.   Pain Assessment  Pain Level: 6  Response to Pain Intervention: Patient Satisfied    Social/Functional History  Social/Functional History  Lives With: Family (niece)  Type of Home: Apartment  Home Layout: One level (basement level apartment, stairs downward)  Home Access: Stairs to enter with rails  Entrance Stairs - Number of Steps: 2-3  Entrance Stairs - Rails: Right  Bathroom Shower/Tub: Tub/Shower unit  Bathroom Toilet: Standard  Bathroom Accessibility: Not accessible  Home Equipment: [employer] Help From: Family  ADL Assistance: Needs assistance  Bath: Minimal assistance  Homemaking Assistance: Needs assistance  Homemaking Responsibilities: Yes  Meal Prep Responsibility: Primary (simple meals and snacks due to difficulty standing for long periods of time)  Laundry Responsibility: No (pt reports doesnt do laundry; \"I only have two pairs of pants\")  Cleaning Responsibility: Secondary (simple cleaning, does not do heavy cleaning such as mopping or vacuuming)  Shopping Responsibility: No  Ambulation Assistance: Independent  Transfer Assistance: Independent  Active : No  Patient's  Info: nephew  Mode of Transportation: Car  Occupation: Other(comment)  Leisure & Hobbies: watch television ([employer])  Additional Comments: Pt states she does not have much family support, niece who she lives with is not available much during day       Objective   Vision: Impaired  Vision Exceptions: Wears glasses for reading (reports needing to go to eye doctor soon to get new glasses)  Hearing: Within functional limits      Orientation  Overall Orientation Status: Within Normal Limits  Orientation Level: Oriented X4      Balance  Sitting Balance: Supervision (sat EOB ~8 minutes unsupported for ROM/MMT and evaluation questions)  Standing Balance: Stand by assistance  Standing Balance  Time: ~2 minutes total  Activity: stood at sinkside to complete hand hygiene and static EOB  Comment: no LOB/unsteadiness; pt reports having difficulty standing long periods of time; declined to do many tasks in standing    Functional Mobility  Functional - Mobility Device: Cane  Activity: To/from bathroom  Assist Level: Stand by assistance  Functional Mobility Comments: Completed functional mobility to/from bathroom using cane; pt reports needing to use cane at all times    Toilet Transfers  Toilet - Technique: Ambulating  Equipment Used: Standard toilet  Toilet Transfer: Stand by assistance    ADL  Feeding: Independent  Grooming: Independent  UE Bathing: Stand by assistance;Setup  LE Bathing: Setup;Contact guard assistance  UE Dressing: Setup;Stand by assistance  LE Dressing: Setup;Stand by assistance  Toileting: Stand by assistance  Additional Comments: Pt able to complete toileting tasks with SBA. SBA required for clothing management standing at toilet, completed pericare seated, demonstrating good dynamic standing balance with supervision. Pt completed hand hygiene IND at sinkside.      Tone RUE  RUE Tone: Normotonic  Tone LUE  LUE Tone: Normotonic    Coordination  Movements Are Fluid And Coordinated: Yes  Coordination and Movement description: Decreased speed     Bed mobility  Supine to Sit: Supervision  Sit to Supine: Supervision  Scooting: Supervision  Comment: head of bed elevated     Transfers  Sit to stand: Stand by assistance  Stand to sit: Stand by assistance     Cognition  Overall Cognitive Status: WNL  Arousal/Alertness: Appropriate responses to stimuli  Following Commands:  Follows all commands without difficulty  Attention Span: Appears intact  Memory: Decreased recall of recent events  Safety Judgement: Decreased awareness of need for assistance;Decreased awareness of need for safety  Problem Solving: Assistance required to identify errors made  Insights: Decreased awareness of deficits  Initiation: Requires cues for some  Sequencing: Does not require cues        Sensation  Overall Sensation Status: Impaired (tingling in hands)        LUE AROM (degrees)  LUE AROM : WFL  Left Hand AROM (degrees)  Left Hand AROM: WFL  RUE AROM (degrees)  RUE AROM : WFL  Right Hand AROM (degrees)  Right Hand AROM: WFL  LUE Strength  Gross LUE Strength: WFL  L Hand General: 4/5  LUE Strength Comment: grossly 4/5  RUE Strength  Gross RUE Strength: WFL  R Hand General: 4/5  RUE Strength Comment: grossly 4/5           Plan   Plan  Times per week: 2-3x/wk  Current Treatment Recommendations: Functional Mobility Training,Endurance Training,Safety Education & Training,Self-Care / ADL,Home Management Training,Patient/Caregiver Education & Training             AM-PAC Score        [facility] Inpatient Daily Activity Raw Score: 20 (01/04/22 1519)  AM-PAC Inpatient ADL T-Scale Score : 42.03 (01/04/22 1519)  ADL Inpatient CMS 0-100% Score: 38.32 (01/04/22 1519)  ADL Inpatient CMS G-Code Modifier : Macarena Isidro (01/04/22 1519)    Goals  Short term goals  Time Frame for Short term goals: By discharge, pt will:  Short term goal 1: Pt will complete functional sit<>stand transfers with mod IND using LRD  Short term goal 2: Pt will complete functional mobility with supervision and LRD  Short term goal 3: Pt will complete 15 minutes of standing tolerance throughout functional tasks with SUP  Short term goal 4: Pt will complete UB ADLs IND  Short term goal 5: Pt will demo LB ADLs with SUP and DME PRN  Short term goal 6: Pt will demo 30 minutes of activity tolerance throughout functional/ADL tasks to promote increased endurance       Therapy Time   Individual Concurrent Group Co-treatment   Time In 1128         Time Out 1148         Minutes 20         Timed Code Treatment Minutes: [address]

## 2022-01-04 NOTE — CONSULTS
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 female who is admitted to the hospital on 1/3/2022  for abd pain. Patient has been following with oncologist at [facility] for diagnosis of pancreatic GIST diagnosed in April 2021. She was evaluated by Dr Marlene Santo at [address] [place] [address] on 7/14/2021. 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 Patient took that she took the pills for few weeks started recently around October but subsequently her all medications as reported by patient not lost or thrown away by her sister and therefore she was not taking any of her pills.     Past Medical History:   has a past medical history of Appendicitis, Cerebral artery occlusion with cerebral infarction ([address] CHF (congestive heart failure) ([address] Chronic respiratory failure with hypoxia and hypercapnia (HCC), Chronic rhinitis, Cigarette smoker, COPD (chronic obstructive pulmonary disease) ([address] Depression, Dysphagia, Essential hypertension, GERD (gastroesophageal reflux disease), Heart failure, diastolic, with acute decompensation ([address] Hepatitis C, chronic ([address] History of smoking at least 1 pack per day for at least 30 years, Hyperlipidemia, Hypertension, Migraine, Moderate COPD (chronic obstructive pulmonary disease) [address] Multiple transfusions, Neurogenic dysphagia, Osteoarthritis, Pneumonia, Scoliosis, and Substance abuse ([facility] Past Surgical History:   has a past surgical history that includes  section; LEEP (); Upper gastrointestinal endoscopy (); Gastric bypass surgery; Appendectomy (2017); laparoscopic appendectomy (N/A, 3/27/2017); Upper gastrointestinal endoscopy (2018); Upper gastrointestinal endoscopy (N/A, 2018); pr colonoscopy w/biopsy single/multiple (N/A, 11/10/2018); pr egd transoral biopsy single/multiple (N/A, 2018); hip surgery (Right, 2019); Total hip arthroplasty (Right, 2019); Medication Injection (Left, 3/25/2021); Upper gastrointestinal endoscopy (N/A, 2021); other surgical history (Left, 2021); Medication Injection (Left, 2021); and Medication Injection (Left, 2021). Medications:    Prior to Admission medications    Medication Sig Start Date End Date Taking? Authorizing Provider   gabapentin (NEURONTIN) 300 MG capsule Take 1 capsule by mouth 3 times daily for 30 days.  Intended supply: 90 days 9/27/21 10/27/21  Vernestine Cambric, APRN - CNP   oxyCODONE-acetaminophen (PERCOCET) 5-325 MG per tablet  21   Historical Provider, MD   ondansetron (ZOFRAN-ODT) 8 MG TBDP disintegrating tablet  21   Historical Provider, MD   mirtazapine (REMERON) 30 MG tablet  21   Historical Provider, MD   [address] 4-1 % [facility]  21   Historical Provider, MD   imatinib (GLEEVEC) 400 MG chemo tablet  21   Historical Provider, MD   gabapentin (NEURONTIN) 300 MG capsule  21   Historical Provider, MD   famotidine (PEPCID) 20 MG tablet  21   Historical Provider, MD   butalbital-acetaminophen-caffeine (FIORICET, ESGIC) -40 MG per tablet Take 1 tablet by mouth every 12 hours for 14 days 5/13/21 9/27/21  E Svitlana Guan MD   ferrous sulfate (FE TABS 325) 325 (65 Fe) MG EC tablet Take 1 tablet by mouth daily 5/14/21   E Svitlana Guan MD   amitriptyline (ELAVIL) 10 MG tablet Take 1 tablet by mouth nightly for 14 days 5/13/21 9/27/21  E Svitlana Guan MD   atorvastatin (LIPITOR) 40 MG tablet Take 1 tablet by mouth daily for 14 days 5/13/21 9/27/21  E Svitlana Guan MD   acetaminophen (TYLENOL) 500 MG tablet Take 2 tablets by mouth every 6 hours as needed for Pain 5/7/21   Tara Leung, DO   mometasone-formoterol (DULERA) 100-5 MCG/ACT inhaler Inhale 2 puffs into the lungs 2 times daily 4/6/21   Cyn Melena, DO   lisinopril (PRINIVIL;ZESTRIL) 10 MG tablet Take 1 tablet by mouth daily 4/6/21   Cyn Melena, DO   clopidogrel (PLAVIX) 75 MG tablet Take 1 tablet by mouth daily 4/6/21   Cyn Melena, DO   carvedilol (COREG) 6.25 MG tablet Take 1 tablet by mouth 2 times daily (with meals) 4/6/21   Cyn Melena, DO   atorvastatin (LIPITOR) 40 MG tablet Take 1 tablet by mouth nightly 4/6/21   Cyn Melena, DO   apixaban (ELIQUIS) 5 MG TABS tablet Take 1 tablet by mouth 2 times daily 4/6/21   Cyn Melena, DO   amitriptyline (ELAVIL) 10 MG tablet Take 1 tablet by mouth nightly 4/6/21   Cyn Melena, DO   albuterol sulfate HFA (PROVENTIL HFA) 108 (90 Base) MCG/ACT inhaler Inhale 1-2 puffs into the lungs once for 1 dose 4/6/21 9/27/21  Cyn Melena, DO   amLODIPine (NORVASC) 10 MG tablet Take 1 tablet by mouth daily 2/9/21 2/9/21  Nell Smith MD   [facility].  Devices [facility]) MISC 1 Device by Does not apply route once for 1 dose 8/5/19 6/6/21  Akash Allison PA-C   ipratropium-albuterol (DUONEB) 0.5-2.5 (3) MG/3ML SOLN nebulizer solution Inhale 3 mLs into the lungs every 4 hours as needed for Shortness of Breath 1/11/19   Miguel Alpers, MD   vitamin B-1 100 MG tablet Take 1 tablet by mouth daily 10/13/18   Ish Cameron MD   vitamin B-12 250 MCG tablet Take 1 tablet by mouth daily 10/13/18   Jami Purvis Nori Conti MD   Multiple Vitamins-Minerals (THERAPEUTIC MULTIVITAMIN-MINERALS) tablet Take 1 tablet by mouth daily 8/12/18   Milton Dow MD     Current Facility-Administered Medications   Medication Dose Route Frequency Provider Last Rate Last Admin    butalbital-acetaminophen-caffeine (FIORICET, ESGIC) per tablet 1 tablet  1 tablet Oral Q4H PRN Anu Padron, DO   1 tablet at 01/04/22 0851    amitriptyline (ELAVIL) tablet 10 mg  10 mg Oral Nightly Taina Mean, MD   10 mg at 01/03/22 2116    apixaban (ELIQUIS) tablet 5 mg  5 mg Oral BID Taina Mean, MD   5 mg at 01/04/22 0851    atorvastatin (LIPITOR) tablet 40 mg  40 mg Oral Nightly Taina Mean, MD   40 mg at 01/03/22 2054    carvedilol (COREG) tablet 6.25 mg  6.25 mg Oral BID WC Taina Mean, MD   6.25 mg at 01/04/22 0851    clopidogrel (PLAVIX) tablet 75 mg  75 mg Oral Daily Taina Mean, MD   75 mg at 01/04/22 2898    famotidine (PEPCID) tablet 20 mg  20 mg Oral BID Taina Mean, MD   20 mg at 01/04/22 5533    gabapentin (NEURONTIN) capsule 300 mg  300 mg Oral TID Taina Mean, MD   300 mg at 01/04/22 1424    lisinopril (PRINIVIL;ZESTRIL) tablet 10 mg  10 mg Oral Daily Taina Mean, MD   10 mg at 01/04/22 0851    0.9 % sodium chloride infusion   IntraVENous Continuous Taina Mean,  mL/hr at 01/04/22 1400 New Bag at 01/04/22 1400    sodium chloride flush 0.9 % injection 5-40 mL  5-40 mL IntraVENous 2 times per day Taina Mean, MD   10 mL at 01/04/22 9048    sodium chloride flush 0.9 % injection 5-40 mL  5-40 mL IntraVENous PRN Taina Mean, MD        0.9 % sodium chloride infusion  25 mL IntraVENous PRN Taina Mean, MD        potassium chloride (KLOR-CON M) extended release tablet 40 mEq  40 mEq Oral PRN Taina Mean, MD        Or    potassium bicarb-citric acid (EFFER-K) effervescent tablet 40 mEq  40 mEq Oral PRN Taina Mean, MD        Or    potassium chloride 10 mEq/100 mL IVPB (Peripheral Line)  10 mEq IntraVENous PRN Vanda Santana MD        ondansetron [facility]) injection 4 mg  4 mg IntraVENous Q4H PRN Vanda Santana MD        polyethylene glycol [facility]) packet 17 g  17 g Oral Daily PRN Vanda Santana MD        acetaminophen (TYLENOL) tablet 650 mg  650 mg Oral Q6H PRN Vanda Santana MD        Or    acetaminophen (TYLENOL) suppository 650 mg  650 mg Rectal Q6H PRN Vanda Santana MD        oxyCODONE-acetaminophen (PERCOCET) 5-325 MG per tablet 1 tablet  1 tablet Oral Q4H PRN Vanda Santana MD   1 tablet at 01/03/22 2054    Or    oxyCODONE-acetaminophen (PERCOCET) 5-325 MG per tablet 2 tablet  2 tablet Oral Q4H PRN Vanad Santana MD   2 tablet at 01/04/22 1427       Allergies:  Aspirin and Keflet [cephalexin]    Social History:   reports that she has been smoking cigarettes. She started smoking about 52 years ago. She has a 20.00 pack-year smoking history. She has never used smokeless tobacco. She reports that she does not drink alcohol and does not use drugs. Family History: family history includes Breast Cancer in her mother; Heart Disease in her father. REVIEW OF SYSTEMS:    Constitutional: No fever or chills. No night sweats, no weight loss   Eyes: No eye discharge, double vision, or eye pain   HEENT: negative for sore mouth, sore throat, hoarseness and voice change   Respiratory: negative for cough , sputum, dyspnea, wheezing, hemoptysis, chest pain   Cardiovascular: negative for chest pain, dyspnea, palpitations, orthopnea, PND   Gastrointestinal: negative for nausea, vomiting, diarrhea, constipation, abdominal pain, Dysphagia, hematemesis and hematochezia   Genitourinary: negative for frequency, dysuria, nocturia, urinary incontinence, and hematuria   Integument: negative for rash, skin lesions, bruises.    Hematologic/Lymphatic: negative for easy bruising, bleeding, lymphadenopathy, or petechiae   Endocrine: negative for heat or cold intolerance,weight changes, change in bowel habits and hair loss   Musculoskeletal: negative for myalgias, arthralgias, pain, joint swelling,and bone pain   Neurological: negative for headaches, dizziness, seizures, weakness, numbness    PHYSICAL EXAM:      /73   Pulse 66   Temp 97.2 °F (36.2 °C) (Oral)   Resp 16   Ht 4' 11\" (1.499 m)   Wt 95 lb 8 oz (43.3 kg)   LMP 2015   SpO2 95%   BMI 19.29 kg/m²    Temp (24hrs), Av.2 °F (36.2 °C), Min:97.2 °F (36.2 °C), Max:97.2 °F (36.2 °C)    General appearance - well appearing, no in pain or distress   Mental status - alert and cooperative   Eyes - pupils equal and reactive, extraocular eye movements intact   Ears - bilateral TM's and external ear canals normal   Mouth - mucous membranes moist, pharynx normal without lesions   Neck - supple, no significant adenopathy   Lymphatics - no palpable lymphadenopathy, no hepatosplenomegaly   Chest - clear to auscultation, no wheezes, rales or rhonchi, symmetric air entry   Heart - normal rate, regular rhythm, normal S1, S2, no murmurs  Abdomen - soft, nontender, nondistended, no masses or organomegaly   Neurological - alert, oriented, normal speech, no focal findings or movement disorder noted   Musculoskeletal - no joint tenderness, deformity or swelling   Extremities - peripheral pulses normal, no pedal edema, no clubbing or cyanosis   Skin - normal coloration and turgor, no rashes, no suspicious skin lesions noted ,    DATA:    Labs:   CBC:   Recent Labs     22  1554 22  0738   WBC 3.2* 3.6   HGB 14.0 12.6   HCT 44.9 39.0   PLT See Reflexed IPF Result See Reflexed IPF Result     BMP:   Recent Labs     22  1554 22  0738    137   K 3.9 4.2   CO2 24 18*   BUN 9 9   CREATININE 0.48* 0.54   LABGLOM >60 >60   GLUCOSE 87 83     PT/INR: No results for input(s): PROTIME, INR in the last 72 hours.     IMAGING DATA:  CT CHEST ABDOMEN PELVIS W CONTRAST   Final Result   Slight decrease in size of the known pancreatic mass.      Otherwise no evidence of metastatic disease elsewhere within the   chest/abdomen/pelvis. RECOMMENDATIONS:   Unavailable           Primary Problem  <principal problem not specified>    Active Hospital Problems    Diagnosis Date Noted    Abdominal pain [R10.9] 05/05/2021     IMPRESSION:   1. 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 She was started on a surgical candidate therefore started on neoadjuvant Gleevec. Is unclear how compliant patient is  2. Her recent scan showing decrease in the size of pancreatic mass  3. No other metastatic disease noted  4. Abdominal pain    RECOMMENDATIONS:  1. I reviewed the recent lab work, also the diagnosis and treatment recommendations  2. Patient's recent scan showing decrease in the size of pancreatic mass  3. Patient has taken [address] for only few weeks  4. I recommend follow with her medical oncology and also surgery as outpatient to consider resection of her GIST  5. Okay to discharge from oncology standpoint when she is stable    Discussed with patient and Nurse. Thank you for asking us to see this patient. James Whitlock MD  Hematologist/Medical Oncologist    Cell: [phone]    This note is created with the assistance of a speech recognition program.  While intending to generate a document that actually reflects the content of the visit, the document can still have some errors including those of syntax and sound a like substitutions which may escape proof reading. It such instances, actual meaning can be extrapolated by contextual diversion.

## 2022-01-04 NOTE — PLAN OF CARE
I spoke with Dr. Margaux Briggs who is an endocrine surgeon at [facility], he is willing to see the patient in his office after she is discharged. Pending further oncology recs.      Keisha Dominguez, DO

## 2022-01-04 NOTE — PROGRESS NOTES
[facility]  CDU / OBSERVATION eNCOUnter  Attending NOte       I performed a history and physical examination of the patient and discussed management with the resident. I reviewed the residents note and agree with the documented findings and plan of care. Any areas of disagreement are noted on the chart. I was personally present for the key portions of any procedures. I have documented in the chart those procedures where I was not present during the key portions. I have reviewed the nurses notes. I agree with the chief complaint, past medical history, past surgical history, allergies, medications, social and family history as documented unless otherwise noted below. The Family history, social history, and ROS are effectively unchanged since admission unless noted elsewhere in the chart. 78-year-old here with known pacreatic cancer and acute on chronic abdominal pain. Oncology to see. Further recommendations per Oncology. CT CHEST ABDOMEN PELVIS W CONTRAST    Result Date: 1/3/2022  EXAMINATION: CT OF THE CHEST, ABDOMEN, AND PELVIS WITH CONTRAST 1/3/2022 4:47 pm TECHNIQUE: CT of the chest, abdomen and pelvis was performed with the administration of intravenous contrast. Multiplanar reformatted images are provided for review. Dose modulation, iterative reconstruction, and/or weight based adjustment of the mA/kV was utilized to reduce the radiation dose to as low as reasonably achievable. COMPARISON: 05/19/2021, CT angiogram chest 11/13/2018 HISTORY: ORDERING SYSTEM PROVIDED HISTORY: history of malignancy and has not been taking her chemo TECHNOLOGIST PROVIDED HISTORY: history of malignancy and has not been taking her chemo Decision Support Exception - unselect if not a suspected or confirmed emergency medical condition->Emergency Medical Condition (MA) Reason for Exam: pain,tachycardia,malignancy FINDINGS: Chest: Mediastinum: Aorta noted diffuse atherosclerotic disease. Cardiomegaly. Coronary disease. No effusion. No suspicious adenopathy identified. Lungs/pleura: Emphysematous change. No effusion or pneumothorax. No suspicious nodule. Mild scarring left lung base. Soft Tissues/Bones: Multilevel degenerate change. Abdomen/Pelvis: Organs: Fatty liver. Prominence of the common bile duct again identified 6 mm. Otherwise the liver, gallbladder, spleen, adrenal glands, and kidneys are unremarkable. There is redemonstration of the heterogeneous enhancing mass along the inferior margin of the pancreatic body slightly smaller in size measuring 2.2 by 1.5 x 2.2 cm in size. GI/Bowel: Slight thickening the gastric antrum related to underdistention. Mild retained stool throughout colon. No bowel obstruction. Colonic diverticulosis noted. Pelvis: Beam hardening artifact arising from a right hip arthroplasty challenge evaluation of the pelvic contents. Bladder, uterus, and adnexal regions are grossly unremarkable as visualized. Sub lumbar confusion uterus suggesting fibroids. Peritoneum/Retroperitoneum: No free fluid. Aortic vascular calcifications. Aorta is nonaneurysmal.  No bulky adenopathy. Bones/Soft Tissues: Dextrocurvature of the thoracolumbar junction. Multilevel degenerate changes. Advanced degenerate changes left hip with flattening of the femoral head noted. There is subchondral cystic changes identified involving femoral head and the acetabulum. Bartholin's cyst on the right again identified not substantially changed. Scattered subcentimeter lesion sidentified involving the spine appears stable dating back to 2019. Slight decrease in size of the known pancreatic mass. Otherwise no evidence of metastatic disease elsewhere within the chest/abdomen/pelvis.  RECOMMENDATIONS: Nuvia Grullon MD  Attending Emergency  Physician

## 2022-01-04 NOTE — PROGRESS NOTES
Physical Therapy    Facility/Department: [address] MED SURG  Initial Assessment    NAME: Amari Daniels  : 1955  MRN: [id number]  Chief Complaint   Patient presents with    Hip Pain    Tachycardia     Date of Service: 2022. Discharge Recommendations:    Further therapy recommended at discharge. PT Equipment Recommendations  Equipment Needed: Yes  Mobility Devices: Lefty Dame: Rolling    Assessment   Body structures, Functions, Activity limitations: Decreased functional mobility ; Decreased strength;Decreased safe awareness;Decreased vision/visual deficit; Decreased balance;Decreased fine motor control  Assessment: Pt presents with gait, balance and strength deficits with fall risk due to right hip pain and pathology  Specific instructions for Next Treatment: Progress gait distance and stairs as indicated. Decision Making: Medium Complexity  Clinical Presentation: evolving  PT Education: Goals;PT Role;Home Exercise Program;Plan of Care;Transfer Training;General Safety;Gait Training;Functional Mobility Training;Disease Specific Education  Patient Education: Pt educated on fall risk and need for assist with mobility  REQUIRES PT FOLLOW UP: Yes  Activity Tolerance  Activity Tolerance: Patient Tolerated treatment well  Activity Tolerance: Pt tolerated activity well and reported mobilty and training as easy. Patient Diagnosis(es): The encounter diagnosis was Generalized abdominal pain.      has a past medical history of Appendicitis, Cerebral artery occlusion with cerebral infarction ([address] CHF (congestive heart failure) ([address] Chronic respiratory failure with hypoxia and hypercapnia (HCC), Chronic rhinitis, Cigarette smoker, COPD (chronic obstructive pulmonary disease) ([address] Depression, Dysphagia, Essential hypertension, GERD (gastroesophageal reflux disease), Heart failure, diastolic, with acute decompensation ([address] Hepatitis C, chronic ([address] History of smoking at least 1 pack per day for at least 30 years, Hyperlipidemia, Hypertension, Migraine, Moderate COPD (chronic obstructive pulmonary disease) (HCC), Multiple transfusions, Neurogenic dysphagia, Osteoarthritis, Pneumonia, Scoliosis, and Substance abuse ([facility] has a past surgical history that includes  section; LEEP (); Upper gastrointestinal endoscopy (); Gastric bypass surgery; Appendectomy (2017); laparoscopic appendectomy (N/A, 3/27/2017); Upper gastrointestinal endoscopy (2018); Upper gastrointestinal endoscopy (N/A, 2018); pr colonoscopy w/biopsy single/multiple (N/A, 11/10/2018); pr egd transoral biopsy single/multiple (N/A, 2018); hip surgery (Right, 2019); Total hip arthroplasty (Right, 2019); Medication Injection (Left, 3/25/2021); Upper gastrointestinal endoscopy (N/A, 2021); other surgical history (Left, 2021); Medication Injection (Left, 2021); and Medication Injection (Left, 2021). Restrictions  Restrictions/Precautions  Restrictions/Precautions: General Precautions,Fall Risk,Up as Tolerated  Required Braces or Orthoses?: No  Vision/Hearing  Vision: Impaired  Vision Exceptions: Wears glasses for reading;Wears glasses for distance (Pt states in need of perscription glasses)     Subjective  General  Chart Reviewed: Yes  Patient assessed for rehabilitation services?: Yes  Additional Pertinent Hx: further oncology evaluation pending  Response To Previous Treatment: Not applicable  Family / Caregiver Present: No  Subjective  Subjective: Pt report recent pain and unsteadiness with walking,  right hip patholoyg in need of surgery  Pain Screening  Patient Currently in Pain: Yes  Pain Assessment  Pain Level: 5  Pain Type: Chronic pain;Acute pain  Pain Location: Abdomen; Hip  Vital Signs  Patient Currently in Pain: Yes       Orientation  Orientation  Overall Orientation Status: Within Functional Limits  Social/Functional History  Social/Functional History  Lives With: Other (comment) (Pt states she lost her apartment and has been staying with sister and niece and is in need of her own apt due to currentl living situation)  Home Access: Stairs to enter with rails  Entrance Stairs - Number of Steps: 2-3  Entrance Stairs - Rails: Right  Bathroom Shower/Tub: Tub/Shower unit (pt report difficulty getting into tub assist needed)  Bathroom Toilet: Standard  Bathroom Accessibility: Not accessible  Home Equipment: Steve Netmari (had been using 4WW until family member disposed of it, in need of walker)  Receives Help From: Family  ADL Assistance: Needs assistance  Bath: Minimal assistance (assist needed to get into tub due to hip pathology)  Homemaking Responsibilities: No  Ambulation Assistance: Independent  Transfer Assistance: Independent  Active : No  Patient's  Info: used to use cab service at own apt  Occupation: Other(comment) (Pt on SSI)  Additional Comments: Pt states she has minimal family support and in need of assistance with housing and care  Cognition   Cognition  Overall Cognitive Status: Exceptions  Arousal/Alertness: Appropriate responses to stimuli  Following Commands: Follows one step commands consistently  Attention Span: Attends with cues to redirect  Memory: Decreased recall of recent events  Safety Judgement: Decreased awareness of need for assistance  Problem Solving: Assistance required to identify errors made  Insights: Decreased awareness of deficits  Initiation: Requires cues for some  Sequencing: Requires cues for some    Objective     Observation/Palpation  Posture: Good  Observation: Pt demonstrates antelgic gait and report right hip pathology    AROM RLE (degrees)  RLE AROM: WFL  AROM LLE (degrees)  LLE AROM : WFL  Strength RLE  Strength RLE: WNL  Strength LLE  Strength LLE: WNL  Tone RLE  RLE Tone: Normotonic  Tone LLE  LLE Tone: Normotonic  Sensation  Overall Sensation Status: WFL  Bed mobility  Bridging: Independent; Moderate assistance  Rolling to Left: Independent  Rolling to Right: Independent  Supine to Sit: Independent  Sit to Supine: Independent  Transfers  Sit to Stand: Stand by assistance  Stand to sit: Supervision;Stand by assistance  Bed to Chair: Stand by assistance  Ambulation  Ambulation?: Yes  Ambulation 1  Surface: level tile  Device: Small Casey Gilliland  Assistance: Stand by assistance  Quality of Gait: Pt report leg length descrepancy due to rigth hip pathology and need for hip replacement  Gait Deviations: Slow Geovanna;Decreased step length     Balance  Posture: Good  Sitting - Static: Good  Sitting - Dynamic: Good  Standing - Static: Good  Standing - Dynamic: Fair;+  Exercises  Comments: Pt sit unsupported at EOB x 15 minutes, sit <> stand SBA, ambulate 60 ft while carrying SBQC with unsteadiness during turns. Plan   Plan  Times per week: 5x/wk  Specific instructions for Next Treatment: Progress gait distance and stairs as indicated. Current Treatment Recommendations: Strengthening,Transfer Training,Balance Training,Functional Mobility Training,Stair training,Safety Education & Training,Neuromuscular Re-education,Endurance Training,Patient/Caregiver Education & Training  Safety Devices  Type of devices:  All fall risk precautions in place,Call light within reach,Gait belt,Patient at risk for falls,Left in bed,Nurse notified  Restraints  Initially in place: No                                                 AM-PAC Score     AM-PAC Inpatient Mobility without Stair Climbing Raw Score : 18 (01/04/22 1224)  AM-PAC Inpatient without Stair Climbing T-Scale Score : 51.97 (01/04/22 1224)  Mobility Inpatient CMS 0-100% Score: 23.26 (01/04/22 1224)  Mobility Inpatient without Stair CMS G-Code Modifier : Thornell Lesches (01/04/22 1224)       Goals  Short term goals  Time Frame for Short term goals: 14 days  Short term goal 1: Pt to ambulate 100 ft  with Mod I using appropraite AD for independent and safe mobilty  Short term goal 2: Pt to demonstrate good dynamic standing balance to facilitate safety with independent gait. [employer] Records   Short term goal 3: Pt to ascend/descend 4 steps with MOD I, using right railing for safety to enter current living environment  Patient Goals   Patient goals : to walk with less pain       Therapy Time   Individual Concurrent Group Co-treatment   Time In 1050         Time Out 1130         Minutes 40         Timed Code Treatment Minutes: 25 Minutes       Lm Farmer, PT

## 2022-01-04 NOTE — PLAN OF CARE
Nutrition Problem #1: Inadequate oral intake  Intervention: Food and/or Nutrient Delivery: Continue Current Diet,Continue Oral Nutrition Supplement (Provide Ensure oral supplements with all meals.)  Nutritional Goals: Meet % of estimated nutrition needs.

## 2022-01-04 NOTE — PLAN OF CARE
Problem: Falls - Risk of:  Goal: Will remain free from falls  Description: Will remain free from falls  1/4/2022 0951 by Lake Grajeda RN  Outcome: Ongoing  1/3/2022 2259 by Gerald Craig RN  Outcome: Ongoing  1/3/2022 2259 by Gerald Craig RN  Outcome: Ongoing  Goal: Absence of physical injury  Description: Absence of physical injury  1/4/2022 0951 by Lake Grajeda RN  Outcome: Ongoing  1/3/2022 2259 by Gerald Craig RN  Outcome: Ongoing  1/3/2022 2259 by Gerald Craig RN  Outcome: Ongoing     Problem: Skin Integrity:  Goal: Will show no infection signs and symptoms  Description: Will show no infection signs and symptoms  1/4/2022 0951 by Lake Grajeda RN  Outcome: Ongoing  1/3/2022 2259 by Gerald Craig RN  Outcome: Ongoing  1/3/2022 2259 by Gerald Craig RN  Outcome: Ongoing  Goal: Absence of new skin breakdown  Description: Absence of new skin breakdown  1/4/2022 0951 by Lake Grajeda RN  Outcome: Ongoing  1/3/2022 2259 by Gerald Craig RN  Outcome: Ongoing  1/3/2022 2259 by Gerald Craig RN  Outcome: Ongoing     Problem: Pain:  Goal: Pain level will decrease  Description: Pain level will decrease  1/4/2022 0951 by Lake Grajeda RN  Outcome: Ongoing  1/3/2022 2259 by Gerald Craig RN  Outcome: Ongoing  Goal: Control of acute pain  Description: Control of acute pain  1/4/2022 0951 by Lake Grajeda RN  Outcome: Ongoing  1/3/2022 2259 by Gerald Craig RN  Outcome: Ongoing  Goal: Control of chronic pain  Description: Control of chronic pain  1/4/2022 0951 by Lake Grajeda RN  Outcome: Ongoing  1/3/2022 2259 by Gerald Craig RN  Outcome: Ongoing

## 2022-01-04 NOTE — PLAN OF CARE
Problem: Falls - Risk of:  Goal: Will remain free from falls  Description: Will remain free from falls  1/3/2022 2259 by Jasmin Jason RN  Outcome: Ongoing  1/3/2022 2259 by Jasmin Jason RN  Outcome: Ongoing  Goal: Absence of physical injury  Description: Absence of physical injury  1/3/2022 2259 by Jasmin Jason RN  Outcome: Ongoing  1/3/2022 2259 by Jasmin Jason RN  Outcome: Ongoing     Problem: Skin Integrity:  Goal: Will show no infection signs and symptoms  Description: Will show no infection signs and symptoms  1/3/2022 2259 by Jasmin Jason RN  Outcome: Ongoing  1/3/2022 2259 by Jasmin Jason RN  Outcome: Ongoing  Goal: Absence of new skin breakdown  Description: Absence of new skin breakdown  1/3/2022 2259 by Jasmin Jason RN  Outcome: Ongoing  1/3/2022 2259 by Jasmin Jason RN  Outcome: Ongoing     Problem: Pain:  Goal: Pain level will decrease  Description: Pain level will decrease  Outcome: Ongoing  Goal: Control of acute pain  Description: Control of acute pain  Outcome: Ongoing  Goal: Control of chronic pain  Description: Control of chronic pain  Outcome: Ongoing

## 2022-01-05 VITALS
TEMPERATURE: 97.9 F | SYSTOLIC BLOOD PRESSURE: 173 MMHG | RESPIRATION RATE: 16 BRPM | OXYGEN SATURATION: 97 % | BODY MASS INDEX: 19.25 KG/M2 | WEIGHT: 95.5 LBS | DIASTOLIC BLOOD PRESSURE: 93 MMHG | HEIGHT: 59 IN | HEART RATE: 64 BPM

## 2022-01-05 PROCEDURE — 6370000000 HC RX 637 (ALT 250 FOR IP): Performed by: STUDENT IN AN ORGANIZED HEALTH CARE EDUCATION/TRAINING PROGRAM

## 2022-01-05 PROCEDURE — 6370000000 HC RX 637 (ALT 250 FOR IP): Performed by: EMERGENCY MEDICINE

## 2022-01-05 PROCEDURE — 2580000003 HC RX 258: Performed by: EMERGENCY MEDICINE

## 2022-01-05 PROCEDURE — 96375 TX/PRO/DX INJ NEW DRUG ADDON: CPT

## 2022-01-05 PROCEDURE — 6360000002 HC RX W HCPCS: Performed by: EMERGENCY MEDICINE

## 2022-01-05 PROCEDURE — G0378 HOSPITAL OBSERVATION PER HR: HCPCS

## 2022-01-05 PROCEDURE — 99232 SBSQ HOSP IP/OBS MODERATE 35: CPT | Performed by: INTERNAL MEDICINE

## 2022-01-05 RX ORDER — UREA 10 %
5 LOTION (ML) TOPICAL NIGHTLY
Status: DISCONTINUED | OUTPATIENT
Start: 2022-01-05 | End: 2022-01-05 | Stop reason: HOSPADM

## 2022-01-05 RX ORDER — OXYCODONE HYDROCHLORIDE AND ACETAMINOPHEN 5; 325 MG/1; MG/1
2 TABLET ORAL EVERY 4 HOURS PRN
Qty: 60 TABLET | Refills: 0 | Status: SHIPPED | OUTPATIENT
Start: 2022-01-05 | End: 2022-02-04

## 2022-01-05 RX ADMIN — APIXABAN 5 MG: 5 TABLET, FILM COATED ORAL at 08:51

## 2022-01-05 RX ADMIN — CLOPIDOGREL 75 MG: 75 TABLET, FILM COATED ORAL at 08:50

## 2022-01-05 RX ADMIN — Medication 5 MG: at 02:37

## 2022-01-05 RX ADMIN — CARVEDILOL 6.25 MG: 12.5 TABLET, FILM COATED ORAL at 08:51

## 2022-01-05 RX ADMIN — OXYCODONE HYDROCHLORIDE AND ACETAMINOPHEN 2 TABLET: 5; 325 TABLET ORAL at 08:51

## 2022-01-05 RX ADMIN — GABAPENTIN 300 MG: 300 CAPSULE ORAL at 08:50

## 2022-01-05 RX ADMIN — FAMOTIDINE 20 MG: 20 TABLET, FILM COATED ORAL at 08:50

## 2022-01-05 RX ADMIN — LISINOPRIL 10 MG: 10 TABLET ORAL at 08:50

## 2022-01-05 RX ADMIN — HYDROMORPHONE HYDROCHLORIDE 0.5 MG: 1 INJECTION, SOLUTION INTRAMUSCULAR; INTRAVENOUS; SUBCUTANEOUS at 12:43

## 2022-01-05 RX ADMIN — SODIUM CHLORIDE, PRESERVATIVE FREE 10 ML: 5 INJECTION INTRAVENOUS at 08:51

## 2022-01-05 ASSESSMENT — PAIN SCALES - GENERAL
PAINLEVEL_OUTOF10: 7
PAINLEVEL_OUTOF10: 7

## 2022-01-05 NOTE — PROGRESS NOTES
decompensation ([facility] [address] Hepatitis C, chronic ([facility] [address] History of smoking at least 1 pack per day for at least 30 years, Hyperlipidemia, Hypertension, Migraine, Moderate COPD (chronic obstructive pulmonary disease) ([facility] [address] Multiple transfusions, Neurogenic dysphagia, Osteoarthritis, Pneumonia, Scoliosis, and Substance abuse ([facility] [address] Past Surgical History:   has a past surgical history that includes  section; LEEP (); Upper gastrointestinal endoscopy (); Gastric bypass surgery; Appendectomy (2017); laparoscopic appendectomy (N/A, 3/27/2017); Upper gastrointestinal endoscopy (2018); Upper gastrointestinal endoscopy (N/A, 2018); pr colonoscopy w/biopsy single/multiple (N/A, 11/10/2018); pr egd transoral biopsy single/multiple (N/A, 2018); hip surgery (Right, 2019); Total hip arthroplasty (Right, 2019); Medication Injection (Left, 3/25/2021); Upper gastrointestinal endoscopy (N/A, 2021); other surgical history (Left, 2021); Medication Injection (Left, 2021); and Medication Injection (Left, 2021). Medications:    Prior to Admission medications    Medication Sig Start Date End Date Taking? Authorizing Provider   oxyCODONE-acetaminophen (PERCOCET) 5-325 MG per tablet Take 2 tablets by mouth every 4 hours as needed for Pain for up to 30 days. 22 Yes Lázaro Doherty,    gabapentin (NEURONTIN) 300 MG capsule Take 1 capsule by mouth 3 times daily for 30 days.  Intended supply: 90 days 9/27/21 10/27/21  JAYDON Quinones - CNP   ondansetron (ZOFRAN-ODT) 8 MG TBDP disintegrating tablet  21   Historical Provider, MD   mirtazapine (REMERON) 30 MG tablet  21   Historical Provider, MD   [address] 4-1 % [place] [facility]  21   Historical Provider, MD   imatinib (GLEEVEC) 400 MG chemo tablet  21   Historical Provider, MD   gabapentin (NEURONTIN) 300 MG capsule  21   Historical Provider, MD   famotidine (PEPCID) 20 MG tablet  8/24/21   Historical Provider, MD statonbital-acetaminophen-caffeine (FIORICET, [address]) -40 MG per tablet Take 1 tablet by mouth every 12 hours for 14 days 5/13/21 9/27/21  E Merline Cockayne, MD   ferrous sulfate (FE TABS 325) 325 (65 Fe) MG EC tablet Take 1 tablet by mouth daily 5/14/21   E Merline Cockayne, MD   amitriptyline (ELAVIL) 10 MG tablet Take 1 tablet by mouth nightly for 14 days 5/13/21 9/27/21  E Merline Cockayne, MD   atorvastatin (LIPITOR) 40 MG tablet Take 1 tablet by mouth daily for 14 days 5/13/21 9/27/21  E Merline Cockayne, MD   acetaminophen (TYLENOL) 500 MG tablet Take 2 tablets by mouth every 6 hours as needed for Pain 5/7/21   Tara Leung, DO   mometasone-formoterol (DULERA) 100-5 MCG/ACT inhaler Inhale 2 puffs into the lungs 2 times daily 4/6/21   Kai Arora, DO   lisinopril (PRINIVIL;ZESTRIL) 10 MG tablet Take 1 tablet by mouth daily 4/6/21   [facility], DO   clopidogrel (PLAVIX) 75 MG tablet Take 1 tablet by mouth daily 4/6/21   [facility] He[facility], DO   carvedilol (COREG) 6.25 MG tablet Take 1 tablet by mouth 2 times daily (with meals) 4/6/21   [facility] He[facility], DO   atorvastatin (LIPITOR) 40 MG tablet Take 1 tablet by mouth nightly 4/6/21   [facility] Arora, DO   apixaban (ELIQUIS) 5 MG TABS tablet Take 1 tablet by mouth 2 times daily 4/6/21   [facility] Ar[facility], DO   amitriptyline (ELAVIL) 10 MG tablet Take 1 tablet by mouth nightly 4/6/21   [facility] Ar[facility], DO   albuterol sulfate HFA (PROVENTIL HFA) 108 (90 Base) MCG/ACT inhaler Inhale 1-2 puffs into the lungs once for 1 dose 4/6/21 9/27/21  Kai Arora, DO   amLODIPine (NORVASC) 10 MG tablet Take 1 tablet by mouth daily 2/9/21 2/9/21  Balbina Lezama MD   [facility].  Devices [facility]) MISC 1 Device by Does not apply route once for 1 dose 8/5/19 6/6/21  Adelina Wood PA-C   ipratropium-albuterol (DUONEB) 0.5-2.5 (3) MG/3ML SOLN nebulizer solution Inhale 3 mLs into the lungs every 4 hours as needed for Shortness of Breath 1/11/19   Erika Kessler MD   vitamin B-1 100 MG tablet Take 1 tablet by mouth daily 10/13/18   Marya Lindo MD   vitamin B-12 250 MCG tablet Take 1 tablet by mouth daily 10/13/18   Marya Lindo MD   Multiple Vitamins-Minerals (THERAPEUTIC MULTIVITAMIN-MINERALS) tablet Take 1 tablet by mouth daily 8/12/18   Larry Valera MD     Current Facility-Administered Medications   Medication Dose Route Frequency Provider Last Rate Last Admin    melatonin tablet 5 mg  5 mg Oral Nightly Ruma Murdock MD   5 mg at 01/05/22 0237    HYDROmorphone (DILAUDID) injection 0.5 mg  0.5 mg IntraVENous Q3H PRN Rashard Lynch MD   0.5 mg at 01/05/22 1243    butalbital-acetaminophen-caffeine (FIORICET, ESGIC) per tablet 1 tablet  1 tablet Oral Q4H PRN Lindalee Leyden, DO   1 tablet at 01/04/22 0851    amitriptyline (ELAVIL) tablet 10 mg  10 mg Oral Nightly Rashard Lynch MD   10 mg at 01/04/22 2058    apixaban (ELIQUIS) tablet 5 mg  5 mg Oral BID Rashard Lynch MD   5 mg at 01/05/22 0851    atorvastatin (LIPITOR) tablet 40 mg  40 mg Oral Nightly Rashard Lynch MD   40 mg at 01/04/22 2059    carvedilol (COREG) tablet 6.25 mg  6.25 mg Oral BID WC Rashard Lynch MD   6.25 mg at 01/05/22 5445    clopidogrel (PLAVIX) tablet 75 mg  75 mg Oral Daily Rashard Lynch MD   75 mg at 01/05/22 0850    famotidine (PEPCID) tablet 20 mg  20 mg Oral BID Rashard Lynch MD   20 mg at 01/05/22 0850    gabapentin (NEURONTIN) capsule 300 mg  300 mg Oral TID Rashard Lynch MD   300 mg at 01/05/22 0850    lisinopril (PRINIVIL;ZESTRIL) tablet 10 mg  10 mg Oral Daily Rashrad Lynch MD   10 mg at 01/05/22 0850    0.9 % sodium chloride infusion   IntraVENous Continuous Rashard Lynch MD   Stopped at 01/05/22 1550    sodium chloride flush 0.9 % injection 5-40 mL  5-40 mL IntraVENous 2 times per day Rashard Lynch MD   10 mL at 01/05/22 0851    sodium chloride flush 0.9 % injection 5-40 mL  5-40 mL IntraVENous PRN Rashard Lynch MD        0.9 % sodium chloride infusion  25 mL IntraVENous PRDARON Garzon MD        potassium chloride [facility]) extended release tablet 40 mEq  40 mEq Oral PRDARON Garzon MD        Or    potassium bicarb-citric acid (EFFER-K) effervescent tablet 40 mEq  40 mEq Oral PRDARON Garzon MD        Or    potassium chloride 10 mEq/100 mL IVPB (Peripheral Line)  10 mEq IntraVENous DEMAR Garzon MD        ondansetron [facility]) injection 4 mg  4 mg IntraVENous Q4H PRDARON Garzon MD        polyethylene glycol [facility]) packet 17 g  17 g Oral Daily PRDARON Garzon MD        acetaminophen (TYLENOL) tablet 650 mg  650 mg Oral Q6H DEMAR Garzon MD        Or    acetaminophen (TYLENOL) suppository 650 mg  650 mg Rectal Q6H DEMAR Garzon MD        oxyCODONE-acetaminophen (PERCOCET) 5-325 MG per tablet 1 tablet  1 tablet Oral Q4H DEMAR Garzon MD   1 tablet at 01/03/22 2054    Or    oxyCODONE-acetaminophen (PERCOCET) 5-325 MG per tablet 2 tablet  2 tablet Oral Q4H DEMAR Garzon MD   2 tablet at 01/05/22 1158       Allergies:  Aspirin and Sharon Pock [cephalexin]    Social History:   reports that she has been smoking cigarettes. She started smoking about 52 years ago. She has a 20.00 pack-year smoking history. She has never used smokeless tobacco. She reports that she does not drink alcohol and does not use drugs. Family History: family history includes Breast Cancer in her mother; Heart Disease in her father. REVIEW OF SYSTEMS:    Constitutional: No fever or chills.  No night sweats, no weight loss   Eyes: No eye discharge, double vision, or eye pain   HEENT: negative for sore mouth, sore throat, hoarseness and voice change   Respiratory: negative for cough , sputum, dyspnea, wheezing, hemoptysis, chest pain   Cardiovascular: negative for chest pain, dyspnea, palpitations, orthopnea, PND   Gastrointestinal: negative for nausea, vomiting, diarrhea, constipation, abdominal pain, Dysphagia, hematemesis and hematochezia   Genitourinary: negative for frequency, dysuria, nocturia, urinary incontinence, and hematuria   Integument: negative for rash, skin lesions, bruises.    Hematologic/Lymphatic: negative for easy bruising, bleeding, lymphadenopathy, or petechiae   Endocrine: negative for heat or cold intolerance,weight changes, change in bowel habits and hair loss   Musculoskeletal: negative for myalgias, arthralgias, pain, joint swelling,and bone pain   Neurological: negative for headaches, dizziness, seizures, weakness, numbness    PHYSICAL EXAM:      BP (!) 173/93   Pulse 64   Temp 97.9 °F (36.6 °C) (Oral)   Resp 16   Ht 4' 11\" (1.499 m)   Wt 95 lb 8 oz (43.3 kg)   LMP 2015   SpO2 97%   BMI 19.29 kg/m²    Temp (24hrs), Av.1 °F (36.7 °C), Min:97.9 °F (36.6 °C), Max:98.2 °F (36.8 °C)    General appearance - well appearing, no in pain or distress   Mental status - alert and cooperative   Eyes - pupils equal and reactive, extraocular eye movements intact   Ears - bilateral TM's and external ear canals normal   Mouth - mucous membranes moist, pharynx normal without lesions   Neck - supple, no significant adenopathy   Lymphatics - no palpable lymphadenopathy, no hepatosplenomegaly   Chest - clear to auscultation, no wheezes, rales or rhonchi, symmetric air entry   Heart - normal rate, regular rhythm, normal S1, S2, no murmurs  Abdomen - soft, nontender, nondistended, no masses or organomegaly   Neurological - alert, oriented, normal speech, no focal findings or movement disorder noted   Musculoskeletal - no joint tenderness, deformity or swelling   Extremities - peripheral pulses normal, no pedal edema, no clubbing or cyanosis   Skin - normal coloration and turgor, no rashes, no suspicious skin lesions noted ,    DATA:    Labs:   CBC:   Recent Labs     22  1554 22  0738   WBC 3.2* 3.6   HGB 14.0 12.6   HCT 44.9 39.0   PLT See Reflexed IPF Result See Reflexed IPF Result     BMP:   Recent Labs 01/03/22  1554 01/04/22  0738    137   K 3.9 4.2   CO2 24 18*   BUN 9 9   CREATININE 0.48* 0.54   LABGLOM >60 >60   GLUCOSE 87 83     PT/INR: No results for input(s): PROTIME, INR in the last 72 hours. IMAGING DATA:  CT CHEST ABDOMEN PELVIS W CONTRAST   Final Result   Slight decrease in size of the known pancreatic mass. Otherwise no evidence of metastatic disease elsewhere within the   chest/abdomen/pelvis. RECOMMENDATIONS:   Unavailable           Primary Problem  <principal problem not specified>    Active Hospital Problems    Diagnosis Date Noted    Abdominal pain [R10.9] 05/05/2021     IMPRESSION:   1. 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 She was started on a surgical candidate therefore started on neoadjuvant Gleevec. Is unclear how compliant patient is  2. Her recent scan showing decrease in the size of pancreatic mass  3. No other metastatic disease noted  4. Abdominal pain    RECOMMENDATIONS:  1. I reviewed the recent lab work, also the diagnosis and treatment recommendations  2. Patient's recent scan showing decrease in the size of pancreatic mass  3. Patient has taken [address] for only few weeks  4. I recommend follow with her medical oncology and also surgery as outpatient to consider resection of her GIST  5. Okay to discharge from oncology standpoint when she is stable    Discussed with patient and Nurse. Thank you for asking us to see this patient. James Smith MD  Hematologist/Medical Oncologist    Cell: [phone]    This note is created with the assistance of a speech recognition program.  While intending to generate a document that actually reflects the content of the visit, the document can still have some errors including those of syntax and sound a like substitutions which may escape proof reading.   It such instances, actual meaning can be extrapolated by contextual diversion.

## 2022-01-05 NOTE — PROGRESS NOTES
OBS/CDU   RESIDENT NOTE      Patients PCP is: No primary care provider on file. SUBJECTIVE      No acute events overnight. Has been able to tolerate a full diet without nausea or vomiting. The patient is urinating on his own and is passing flatus. Still having mild to severe generalized abdominal and back pain. He is somewhat tired however she is not in any acute distress she is not in any acute pain. She is on Dilaudid for pain control. Patient is hemodynamically stable and otherwise doing well. PHYSICAL EXAM      General: NAD, AO X 3, appears cachectic, weak  Heent: EMOI, PERRL  Neck: SUPPLE, NO JVD  Cardiovascular: RRR, S1S2  Pulmonary: CTAB, NO SOB  Abdomen: SOFT, NTTP, ND, +BS  Extremities: +2/4 PULSES DISTAL, NO SWELLING  Neuro / Psych: NO NUMBNESS OR TINGLING, MENTATION AT BASELINE    PERTINENT TEST /EXAMS      I have reviewed all available laboratory results.     MEDICATIONS CURRENT   melatonin tablet 5 mg, Nightly  butalbital-acetaminophen-caffeine (FIORICET, ESGIC) per tablet 1 tablet, Q4H PRN  amitriptyline (ELAVIL) tablet 10 mg, Nightly  apixaban (ELIQUIS) tablet 5 mg, BID  atorvastatin (LIPITOR) tablet 40 mg, Nightly  carvedilol (COREG) tablet 6.25 mg, BID WC  clopidogrel (PLAVIX) tablet 75 mg, Daily  famotidine (PEPCID) tablet 20 mg, BID  gabapentin (NEURONTIN) capsule 300 mg, TID  lisinopril (PRINIVIL;ZESTRIL) tablet 10 mg, Daily  0.9 % sodium chloride infusion, Continuous  sodium chloride flush 0.9 % injection 5-40 mL, 2 times per day  sodium chloride flush 0.9 % injection 5-40 mL, PRN  0.9 % sodium chloride infusion, PRN  potassium chloride (KLOR-CON M) extended release tablet 40 mEq, PRN   Or  potassium bicarb-citric acid (EFFER-K) effervescent tablet 40 mEq, PRN   Or  potassium chloride 10 mEq/100 mL IVPB (Peripheral Line), PRN  ondansetron (ZOFRAN) injection 4 mg, Q4H PRN  polyethylene glycol (GLYCOLAX) packet 17 g, Daily PRN  acetaminophen (TYLENOL) tablet 650 mg, Q6H PRN Or  acetaminophen (TYLENOL) suppository 650 mg, Q6H PRN  oxyCODONE-acetaminophen (PERCOCET) 5-325 MG per tablet 1 tablet, Q4H PRN   Or  oxyCODONE-acetaminophen (PERCOCET) 5-325 MG per tablet 2 tablet, Q4H PRN        All medication charted and reviewed. CONSULTS      IP CONSULT TO IV TEAM  IP CONSULT TO ONCOLOGY  IP CONSULT TO DIETITIAN    ASSESSMENT/PLAN        Emi Daniels is a 74 y.o. female who presents with      Abdominal pain thought to be secondary to pancreatic GIST  We will continue pain control with opioids  Patient is okay to d/c from oncology standpoint. Recommended   Headaches, typical  None Fioricet, will reevaluate later  Weight loss  Patient reports significant unintentional weight loss since cancer diagnosis  What he injured on this hospitalization  Was not taking ensures at home, will restart here in the hospital  Consultation to dietitian pending recs  Continue home medications and pain control  Monitor vitals, labs, and imaging  DISPO: pending consults and clinical improvement    --  Elizabeth French, DO  Emergency Medicine Resident Physician     This dictation was generated by voice recognition computer software. Although all attempts are made to edit the dictation for accuracy, there may be errors in the transcription that are not intended.

## 2022-01-05 NOTE — DISCHARGE SUMMARY
CDU Discharge Summary        Patient:  Rudell Duverney  YOB: 1955    MRN: [id number]   Acct: [de-identified]    Primary Care Physician: Fadi Castellanos MD    Admit date:  1/3/2022  1:48 PM  Discharge date: 1/5/2021    Discharge Diagnoses:     Acute pain due to pancreatic mass/cancer  Improved with narcotic medications    Follow-up:  Call today/tomorrow for a follow up appointment with Fadi Castellanos MD , or return to the Emergency Room with worsening symptoms    Stressed to patient the importance of following up with primary care doctor for further workup/management of symptoms. Pt verbalizes understanding and agrees with plan. Discharge Medications:  Changes to medications            Medication List      CHANGE how you take these medications    oxyCODONE-acetaminophen 5-325 MG per tablet  Commonly known as: PERCOCET  Take 2 tablets by mouth every 4 hours as needed for Pain for up to 30 days.   What changed:   · how much to take  · how to take this  · when to take this  · reasons to take this        CONTINUE taking these medications    acetaminophen 500 MG tablet  Commonly known as: TYLENOL  Take 2 tablets by mouth every 6 hours as needed for Pain     albuterol sulfate  (90 Base) MCG/ACT inhaler  Commonly known as: Proventil HFA  Inhale 1-2 puffs into the lungs once for 1 dose     * amitriptyline 10 MG tablet  Commonly known as: ELAVIL  Take 1 tablet by mouth nightly     * amitriptyline 10 MG tablet  Commonly known as: ELAVIL  Take 1 tablet by mouth nightly for 14 days     apixaban 5 MG Tabs tablet  Commonly known as: ELIQUIS  Take 1 tablet by mouth 2 times daily     * atorvastatin 40 MG tablet  Commonly known as: LIPITOR  Take 1 tablet by mouth nightly     * atorvastatin 40 MG tablet  Commonly known as: Lipitor  Take 1 tablet by mouth daily for 14 days     butalbital-acetaminophen-caffeine -40 MG per tablet  Commonly known as: FIORICET, ESGIC  Take 1 tablet by mouth every 12 hours for 14 days     carvedilol 6.25 MG tablet  Commonly known as: COREG  Take 1 tablet by mouth 2 times daily (with meals)     clopidogrel 75 MG tablet  Commonly known as: PLAVIX  Take 1 tablet by mouth daily     cyanocobalamin 250 MCG tablet  Take 1 tablet by mouth daily     famotidine 20 MG tablet  Commonly known as: PEPCID     ferrous sulfate 325 (65 Fe) MG EC tablet  Commonly known as: FE TABS 325  Take 1 tablet by mouth daily     * gabapentin 300 MG capsule  Commonly known as: NEURONTIN     * gabapentin 300 MG capsule  Commonly known as: NEURONTIN  Take 1 capsule by mouth 3 times daily for 30 days. Intended supply: 90 days     Icy Hot Lidocaine Plus Menthol 4-1 % Ptch  Generic drug: Lidocaine-Menthol     imatinib 400 MG chemo tablet  Commonly known as: GLEEVEC     ipratropium-albuterol 0.5-2.5 (3) MG/3ML Soln nebulizer solution  Commonly known as: DUONEB  Inhale 3 mLs into the lungs every 4 hours as needed for Shortness of Breath     lisinopril 10 MG tablet  Commonly known as: PRINIVIL;ZESTRIL  Take 1 tablet by mouth daily     mirtazapine 30 MG tablet  Commonly known as: REMERON     mometasone-formoterol 100-5 MCG/ACT inhaler  Commonly known as: DULERA  Inhale 2 puffs into the lungs 2 times daily     ondansetron 8 MG Tbdp disintegrating tablet  Commonly known as: ZOFRAN-ODT     therapeutic multivitamin-minerals tablet  Take 1 tablet by mouth daily     thiamine 100 MG tablet  Take 1 tablet by mouth daily     Wheelchair Misc  1 Device by Does not apply route once for 1 dose         * This list has 6 medication(s) that are the same as other medications prescribed for you. Read the directions carefully, and ask your doctor or other care provider to review them with you.                Where to Get Your Medications      You can get these medications from any pharmacy    Bring a paper prescription for each of these medications  · oxyCODONE-acetaminophen 5-325 MG per tablet         Diet:  ADULT ORAL NUTRITION SUPPLEMENT; Breakfast, Lunch, HS Snack, Dinner; Standard High Calorie/High Protein Oral Supplement  ADULT DIET; Regular , Advance as tolerated     Activity:  As tolerated    Consultants: IP CONSULT TO IV TEAM  IP CONSULT TO ONCOLOGY  IP CONSULT TO DIETITIAN  IP CONSULT TO SOCIAL WORK    Procedures:  Not indicated     Diagnostic Test:   Results for orders placed or performed during the hospital encounter of 01/03/22   COVID-19, Rapid    Specimen: Nasopharyngeal Swab   Result Value Ref Range    Specimen Description . NASOPHARYNGEAL SWAB     SARS-CoV-2, Rapid Not Detected Not Detected   CBC Auto Differential   Result Value Ref Range    WBC 3.2 (L) 3.5 - 11.3 k/uL    RBC 4.38 3.95 - 5.11 m/uL    Hemoglobin 14.0 11.9 - 15.1 g/dL    Hematocrit 44.9 36.3 - 47.1 %    .5 82.6 - 102.9 fL    MCH 32.0 25.2 - 33.5 pg    MCHC 31.2 28.4 - 34.8 g/dL    RDW 12.6 11.8 - 14.4 %    Platelets See Reflexed IPF Result 138 - 453 k/uL    MPV NOT REPORTED 8.1 - 13.5 fL    NRBC Automated 0.0 0.0 per 100 WBC    Differential Type NOT REPORTED     WBC Morphology NOT REPORTED     RBC Morphology NOT REPORTED     Platelet Estimate NOT REPORTED     Seg Neutrophils 45 36 - 65 %    Lymphocytes 40 24 - 43 %    Monocytes 13 (H) 3 - 12 %    Eosinophils % 1 1 - 4 %    Basophils 0 0 - 2 %    Immature Granulocytes 1 (H) 0 %    Segs Absolute 1.45 (L) 1.50 - 8.10 k/uL    Absolute Lymph # 1.29 1.10 - 3.70 k/uL    Absolute Mono # 0.43 0.10 - 1.20 k/uL    Absolute Eos # 0.04 0.00 - 0.44 k/uL    Basophils Absolute <0.03 0.00 - 0.20 k/uL    Absolute Immature Granulocyte <0.03 0.00 - 0.30 k/uL   Basic Metabolic Panel w/ Reflex to MG   Result Value Ref Range    Glucose 87 70 - 99 mg/dL    BUN 9 8 - 23 mg/dL    CREATININE 0.48 (L) 0.50 - 0.90 mg/dL    Bun/Cre Ratio NOT REPORTED 9 - 20    Calcium 9.2 8.6 - 10.4 mg/dL    Sodium 135 135 - 144 mmol/L    Potassium 3.9 3.7 - 5.3 mmol/L    Chloride 101 98 - 107 mmol/L    CO2 24 20 - 31 mmol/L    Anion Gap 10 9 - 17 mmol/L    GFR Non-African American >60 >60 mL/min    GFR African American >60 >60 mL/min    GFR Comment          GFR Staging NOT REPORTED    Lipase   Result Value Ref Range    Lipase 32 13 - 60 U/L   Hepatic Function Panel   Result Value Ref Range    Albumin 4.1 3.5 - 5.2 g/dL    Alkaline Phosphatase 136 (H) 35 - 104 U/L    ALT 25 5 - 33 U/L    AST 36 (H) <32 U/L    Total Bilirubin 0.60 0.3 - 1.2 mg/dL    Bilirubin, Direct 0.26 <0.31 mg/dL    Bilirubin, Indirect 0.34 0.00 - 1.00 mg/dL    Total Protein 7.8 6.4 - 8.3 g/dL    Globulin NOT REPORTED 1.5 - 3.8 g/dL    Albumin/Globulin Ratio 1.1 1.0 - 2.5   Immature Platelet Fraction   Result Value Ref Range    Platelet, Immature Fraction 6.2 1.1 - 10.3 %    Platelet, Fluorescence 273 138 - 453 k/uL   Basic Metabolic Panel w/ Reflex to MG   Result Value Ref Range    Glucose 83 70 - 99 mg/dL    BUN 9 8 - 23 mg/dL    CREATININE 0.54 0.50 - 0.90 mg/dL    Bun/Cre Ratio NOT REPORTED 9 - 20    Calcium 8.2 (L) 8.6 - 10.4 mg/dL    Sodium 137 135 - 144 mmol/L    Potassium 4.2 3.7 - 5.3 mmol/L    Chloride 110 (H) 98 - 107 mmol/L    CO2 18 (L) 20 - 31 mmol/L    Anion Gap 9 9 - 17 mmol/L    GFR Non-African American >60 >60 mL/min    GFR African American >60 >60 mL/min    GFR Comment          GFR Staging NOT REPORTED    CBC auto differential   Result Value Ref Range    WBC 3.6 3.5 - 11.3 k/uL    RBC 3.83 (L) 3.95 - 5.11 m/uL    Hemoglobin 12.6 11.9 - 15.1 g/dL    Hematocrit 39.0 36.3 - 47.1 %    .8 82.6 - 102.9 fL    MCH 32.9 25.2 - 33.5 pg    MCHC 32.3 28.4 - 34.8 g/dL    RDW 12.7 11.8 - 14.4 %    Platelets See Reflexed IPF Result 138 - 453 k/uL    MPV NOT REPORTED 8.1 - 13.5 fL    NRBC Automated 0.0 0.0 per 100 WBC    Differential Type NOT REPORTED     WBC Morphology NOT REPORTED     RBC Morphology NOT REPORTED     Platelet Estimate NOT REPORTED     Seg Neutrophils 36 36 - 65 %    Lymphocytes 44 (H) 24 - 43 %    Monocytes 16 (H) 3 - 12 %    Eosinophils % 3 1 - 4 %    Basophils 1 0 - 2 %    Immature Granulocytes 1 (H) 0 %    Segs Absolute 1.30 (L) 1.50 - 8.10 k/uL    Absolute Lymph # 1.59 1.10 - 3.70 k/uL    Absolute Mono # 0.56 0.10 - 1.20 k/uL    Absolute Eos # 0.10 0.00 - 0.44 k/uL    Basophils Absolute <0.03 0.00 - 0.20 k/uL    Absolute Immature Granulocyte <0.03 0.00 - 0.30 k/uL   Immature Platelet Fraction   Result Value Ref Range    Platelet, Immature Fraction 4.3 1.1 - 10.3 %    Platelet, Fluorescence 168 138 - 453 k/uL     CT CHEST ABDOMEN PELVIS W CONTRAST    Result Date: 1/3/2022  EXAMINATION: CT OF THE CHEST, ABDOMEN, AND PELVIS WITH CONTRAST 1/3/2022 4:47 pm TECHNIQUE: CT of the chest, abdomen and pelvis was performed with the administration of intravenous contrast. Multiplanar reformatted images are provided for review. Dose modulation, iterative reconstruction, and/or weight based adjustment of the mA/kV was utilized to reduce the radiation dose to as low as reasonably achievable. COMPARISON: 05/19/2021, CT angiogram chest 11/13/2018 HISTORY: ORDERING SYSTEM PROVIDED HISTORY: history of malignancy and has not been taking her chemo TECHNOLOGIST PROVIDED HISTORY: history of malignancy and has not been taking her chemo Decision Support Exception - unselect if not a suspected or confirmed emergency medical condition->Emergency Medical Condition (MA) Reason for Exam: pain,tachycardia,malignancy FINDINGS: Chest: Mediastinum: Aorta noted diffuse atherosclerotic disease. Cardiomegaly. Coronary disease. No effusion. No suspicious adenopathy identified. Lungs/pleura: Emphysematous change. No effusion or pneumothorax. No suspicious nodule. Mild scarring left lung base. Soft Tissues/Bones: Multilevel degenerate change. Abdomen/Pelvis: Organs: Fatty liver. Prominence of the common bile duct again identified 6 mm. Otherwise the liver, gallbladder, spleen, adrenal glands, and kidneys are unremarkable.   There is redemonstration of the heterogeneous enhancing mass along the inferior margin of the pancreatic body slightly smaller in size measuring 2.2 by 1.5 x 2.2 cm in size. GI/Bowel: Slight thickening the gastric antrum related to underdistention. Mild retained stool throughout colon. No bowel obstruction. Colonic diverticulosis noted. Pelvis: Beam hardening artifact arising from a right hip arthroplasty challenge evaluation of the pelvic contents. Bladder, uterus, and adnexal regions are grossly unremarkable as visualized. Sub lumbar confusion uterus suggesting fibroids. Peritoneum/Retroperitoneum: No free fluid. Aortic vascular calcifications. Aorta is nonaneurysmal.  No bulky adenopathy. Bones/Soft Tissues: Dextrocurvature of the thoracolumbar junction. Multilevel degenerate changes. Advanced degenerate changes left hip with flattening of the femoral head noted. There is subchondral cystic changes identified involving femoral head and the acetabulum. Bartholin's cyst on the right again identified not substantially changed. Scattered subcentimeter lesion sidentified involving the spine appears stable dating back to 2019. Slight decrease in size of the known pancreatic mass. Otherwise no evidence of metastatic disease elsewhere within the chest/abdomen/pelvis. RECOMMENDATIONS: Unavailable           Physical Exam:    General appearance - NAD, AOx 3 appears cachectic, appears weak  Lungs -CTAB, no R/R/R  Heart - RRR, no no obvious murmurs rubs or gallops  Abdomen - Soft, NT/ND  Neurological:  MAEx4, No focal motor deficit, sensory loss  Extremities - Cap refil <2 sec in all ext., no edema  Skin -warm, dry      Hospital Course:  Clinical course has improved, labs and imaging reviewed. Emi Daniels originally presented to the hospital on 1/3/2022  1:48 PM. with pain and tachycardia. Patient is a known history of pancreatic cancer, she is currently undergoing chemotherapy and has lost a lot of weight.   At that time it was determined that She required further observation and valuation by surgical team and oncology team for further intervention for her pancreatic cancer. She was admitted and labs and imaging were followed daily. Imaging results as above. After evaluation by oncology team, patient was determined to be an appropriate candidate for outpatient chemo therapy treatment continuation and management outside the hospital.  Patient was hemodynamically stable, her pain was appropriately controlled and she was appropriate for discharge. Disposition: Home    Patient stated that they will not drive themselves home from the hospital if they have gotten pain killers/ narcotics earlier that day and that they will arrange for transportation on their own or work with the  for a ride. Patient counseled NOT to drive while under the influence of narcotics/ pain killers. Condition: Stable    Patient stable and ready for discharge home. I have discussed plan of care with patient and they are in understanding. They were instructed to read discharge paperwork. All of their questions and concerns were addressed. Time Spent: 0 day      --  Nikhil Bynum DO  Emergency Medicine Resident Physician    This dictation was generated by voice recognition computer software. Although all attempts are made to edit the dictation for accuracy, there may be errors in the transcription that are not intended.

## 2022-01-05 NOTE — PROGRESS NOTES
CLINICAL PHARMACY NOTE: MEDS TO BEDS    Total # of Prescriptions Filled: 1   The following medications were delivered to the patient:  · PERCOCET 5/325    Additional Documentation:    MED DELIVERED TO PT IN ROOM 349, NO COPAY

## 2022-01-05 NOTE — CARE COORDINATION
Case Management Initial Discharge Plan  Emi Daniels,             Met with:patient to discuss discharge plans. Information verified: address homeless, contacts, no phone number, , insurance Yes  Insurance Provider: [employer]    Emergency Contact/Next of Kin name & number: jose Greene  Who are involved in patient's support system? Family/friends    PCP:Dr Kris Anderson  Date of last visit:       Discharge Planning    Living Arrangements:    homeless staying at various family/friends home        Patient able to perform ADL's:Independent    Current Services (outpatient & in home) none  DME equipment: cane      Is patient receiving oral anticoagulation therapy?  eliquis    If indicated:   Physician managing anticoagulation treatment: [facility] nani cochran        Potential Assistance Needed:   ss consult, medication refills          Evaluation: yes    Expected Discharge date:       Patient expects to be discharged to:   niece's home [address] [employer] provider: alex  Transportation arrangements needed for discharge: Yes    Readmission Risk              Risk of Unplanned Readmission:  0             Does patient have a readmission risk score greater than 14?: No  If yes, follow-up appointment must be made within 7 days of discharge.      Goals of Care:   Safe transition plan     Educated yes on transitional options, provided freedom of choice and are agreeable with plan      Discharge Plan: niece's home      Electronically signed by Yisel Lewis RN on 22 at 12:53 PM EST      15:36 transportation arrangements made

## 2022-01-05 NOTE — PLAN OF CARE
Problem: Falls - Risk of:  Goal: Will remain free from falls  Description: Will remain free from falls  Outcome: Ongoing  Goal: Absence of physical injury  Description: Absence of physical injury  Outcome: Ongoing     Problem: Skin Integrity:  Goal: Will show no infection signs and symptoms  Description: Will show no infection signs and symptoms  Outcome: Ongoing  Goal: Absence of new skin breakdown  Description: Absence of new skin breakdown  Outcome: Ongoing     Problem: Pain:  Goal: Pain level will decrease  Description: Pain level will decrease  Outcome: Ongoing  Goal: Control of acute pain  Description: Control of acute pain  Outcome: Ongoing  Goal: Control of chronic pain  Description: Control of chronic pain  Outcome: Ongoing     Problem: Nutrition  Goal: Optimal nutrition therapy  1/5/2022 0343 by Radha Porras RN  Outcome: Ongoing  1/4/2022 1556 by Paulina Miller RD, LD  Outcome: Ongoing  Note: Nutrition Problem #1: Inadequate oral intake  Intervention: Food and/or Nutrient Delivery: Continue Current Diet,Continue Oral Nutrition Supplement (Provide Ensure oral supplements with all meals.)  Nutritional Goals: Meet % of estimated nutrition needs.

## 2022-01-05 NOTE — CARE COORDINATION
Consult received for homelessness  Pt has been seen by SW in the past.  Pt stated she is currently staying with her niece and plans to return there at discharge. Stated she has been there for 1.5 weeks and prior to that was at her sister's home. Pt stated her sister threw out her belongings and she is need of clothes. Pt advised that SW will provide her with some clothing. Pt stated she has been at [facility] in the past but she is not allowed back as they feel she has too many medical needs. Pt was provided housing resources and a list of area food pantries/meal sites. Pt stated she is linked with [facility] (psychiatrist - Dr Padmaja Hou) but has not seen either in a few months. Pt stated she has difficulty getting to her appts as she does not have a permanent address to arrange her cab service. Stated her [facility] CM, Ted Peck, does provide transportation at times and has taken her around to get housing applications in the past.  Stated she has been out of her psych meds. While resident and SW in pt's room, pt mentioned she was feeling like she wanted to be\"done\" with all this. SW questioned pt further and pt denied any current SI, stated she did have SI several months ago. Pt stated she is just frustrated with her situation and denied thoughts of wanting to hurt herself. Pt was agreeable to SW scheduling her an appt at [facility]. Pt has been discharged from their services. Pt will need to go to their walk in clinic (M-F from 8a-1p) for a new assessment to get re-established with care.  Pt updated and info added to AVS

## 2022-01-06 NOTE — PROGRESS NOTES
OBS/CDU   Attending NOTE      Patients PCP is:  Don Cabrera MD        SUBJECTIVE      Some ongoing pain but improved. Patient now handling p.o. and pain is tolerable. Patient for outpatient follow-up. PHYSICAL EXAM      General: NAD, AO X 3  Heent: EMOI, PERRL  Neck: SUPPLE, NO JVD  Cardiovascular: RRR, S1S2  Pulmonary: CTAB, NO SOB  Abdomen: SOFT, NTTP, ND, +BS  Extremities: +2/4 PULSES DISTAL, NO SWELLING  Neuro / Psych: NO NUMBNESS OR TINGLING, MENTATION AT BASELINE    PERTINENT TEST /EXAMS      I have reviewed all available laboratory results. MEDICATIONS CURRENT   No current facility-administered medications for this encounter. All medication charted and reviewed. CONSULTS      IP CONSULT TO IV TEAM  IP CONSULT TO ONCOLOGY  IP CONSULT TO DIETITIAN  IP CONSULT TO SOCIAL WORK    ASSESSMENT/PLAN       Emi Markham is a 74 y.o. female who presents with       · Pain secondary to metastatic cancer. Patient out of medication secondary to social circumstances  · Evaluated by allergy as well. Will refill medications. · Pain controlled  · Handling p.o. · Patient for outpatient follow-up. Has appropriate follow-up available  · Continue home medications and pain control  · Monitor vitals, labs, and imaging  · DISPO: pending consults and clinical improvement    --  Reza Loera MD  Emergency Medicine Attending Physician     This dictation was generated by voice recognition computer software. Although all attempts are made to edit the dictation for accuracy, there may be errors in the transcription that are not intended.

## 2022-01-10 LAB
EKG ATRIAL RATE: 87 BPM
EKG P AXIS: 69 DEGREES
EKG P-R INTERVAL: 164 MS
EKG Q-T INTERVAL: 372 MS
EKG QRS DURATION: 72 MS
EKG QTC CALCULATION (BAZETT): 447 MS
EKG R AXIS: 43 DEGREES
EKG T AXIS: 76 DEGREES
EKG VENTRICULAR RATE: 87 BPM

## 2022-01-10 PROCEDURE — 93010 ELECTROCARDIOGRAM REPORT: CPT | Performed by: INTERNAL MEDICINE

## 2022-02-08 ENCOUNTER — APPOINTMENT (OUTPATIENT)
Dept: GENERAL RADIOLOGY | Age: 67
End: 2022-02-08
Payer: MEDICARE

## 2022-02-08 ENCOUNTER — HOSPITAL ENCOUNTER (EMERGENCY)
Age: 67
Discharge: HOME | End: 2022-02-08
Attending: EMERGENCY MEDICINE
Payer: MEDICARE

## 2022-02-08 VITALS
RESPIRATION RATE: 16 BRPM | HEART RATE: 89 BPM | DIASTOLIC BLOOD PRESSURE: 102 MMHG | SYSTOLIC BLOOD PRESSURE: 169 MMHG | OXYGEN SATURATION: 96 % | TEMPERATURE: 97.5 F

## 2022-02-08 DIAGNOSIS — G89.29 CHRONIC LEFT HIP PAIN: ICD-10-CM

## 2022-02-08 DIAGNOSIS — N30.00 ACUTE CYSTITIS WITHOUT HEMATURIA: Primary | ICD-10-CM

## 2022-02-08 DIAGNOSIS — M25.552 CHRONIC LEFT HIP PAIN: ICD-10-CM

## 2022-02-08 LAB
-: ABNORMAL
ABSOLUTE EOS #: 0.12 K/UL (ref 0–0.44)
ABSOLUTE IMMATURE GRANULOCYTE: <0.03 K/UL (ref 0–0.3)
ABSOLUTE LYMPH #: 1.31 K/UL (ref 1.1–3.7)
ABSOLUTE MONO #: 0.4 K/UL (ref 0.1–1.2)
ALBUMIN SERPL-MCNC: 4 G/DL (ref 3.5–5.2)
ALBUMIN/GLOBULIN RATIO: 1.1 (ref 1–2.5)
ALP BLD-CCNC: 100 U/L (ref 35–104)
ALT SERPL-CCNC: 43 U/L (ref 5–33)
AMORPHOUS: ABNORMAL
ANION GAP SERPL CALCULATED.3IONS-SCNC: 10 MMOL/L (ref 9–17)
AST SERPL-CCNC: 57 U/L
BACTERIA: ABNORMAL
BASOPHILS # BLD: 0 % (ref 0–2)
BASOPHILS ABSOLUTE: <0.03 K/UL (ref 0–0.2)
BILIRUB SERPL-MCNC: 0.71 MG/DL (ref 0.3–1.2)
BILIRUBIN URINE: ABNORMAL
BUN BLDV-MCNC: 12 MG/DL (ref 8–23)
BUN/CREAT BLD: ABNORMAL (ref 9–20)
CALCIUM SERPL-MCNC: 9.5 MG/DL (ref 8.6–10.4)
CASTS UA: ABNORMAL /LPF (ref 0–2)
CASTS UA: ABNORMAL /LPF (ref 0–2)
CHLORIDE BLD-SCNC: 100 MMOL/L (ref 98–107)
CO2: 23 MMOL/L (ref 20–31)
COLOR: ABNORMAL
COMMENT UA: ABNORMAL
CREAT SERPL-MCNC: 0.47 MG/DL (ref 0.5–0.9)
CRYSTALS, UA: ABNORMAL /HPF
DIFFERENTIAL TYPE: NORMAL
EOSINOPHILS RELATIVE PERCENT: 3 % (ref 1–4)
EPITHELIAL CELLS UA: ABNORMAL /HPF (ref 0–5)
GFR AFRICAN AMERICAN: >60 ML/MIN
GFR NON-AFRICAN AMERICAN: >60 ML/MIN
GFR SERPL CREATININE-BSD FRML MDRD: ABNORMAL ML/MIN/{1.73_M2}
GFR SERPL CREATININE-BSD FRML MDRD: ABNORMAL ML/MIN/{1.73_M2}
GLUCOSE BLD-MCNC: 97 MG/DL (ref 70–99)
GLUCOSE URINE: NEGATIVE
HCT VFR BLD CALC: 44.7 % (ref 36.3–47.1)
HEMOGLOBIN: 14.5 G/DL (ref 11.9–15.1)
IMMATURE GRANULOCYTES: 0 %
KETONES, URINE: ABNORMAL
LEUKOCYTE ESTERASE, URINE: ABNORMAL
LIPASE: 32 U/L (ref 13–60)
LYMPHOCYTES # BLD: 31 % (ref 24–43)
MCH RBC QN AUTO: 33.1 PG (ref 25.2–33.5)
MCHC RBC AUTO-ENTMCNC: 32.4 G/DL (ref 28.4–34.8)
MCV RBC AUTO: 102.1 FL (ref 82.6–102.9)
MONOCYTES # BLD: 9 % (ref 3–12)
MUCUS: ABNORMAL
NITRITE, URINE: POSITIVE
NRBC AUTOMATED: 0 PER 100 WBC
OTHER OBSERVATIONS UA: ABNORMAL
PDW BLD-RTO: 13 % (ref 11.8–14.4)
PH UA: 5.5 (ref 5–8)
PLATELET # BLD: NORMAL K/UL (ref 138–453)
PLATELET ESTIMATE: NORMAL
PLATELET, FLUORESCENCE: 132 K/UL (ref 138–453)
PLATELET, IMMATURE FRACTION: 3.2 % (ref 1.1–10.3)
PMV BLD AUTO: NORMAL FL (ref 8.1–13.5)
POTASSIUM SERPL-SCNC: 4.5 MMOL/L (ref 3.7–5.3)
PROTEIN UA: ABNORMAL
RBC # BLD: 4.38 M/UL (ref 3.95–5.11)
RBC # BLD: NORMAL 10*6/UL
RBC UA: ABNORMAL /HPF (ref 0–2)
RENAL EPITHELIAL, UA: ABNORMAL /HPF
SEG NEUTROPHILS: 57 % (ref 36–65)
SEGMENTED NEUTROPHILS ABSOLUTE COUNT: 2.41 K/UL (ref 1.5–8.1)
SODIUM BLD-SCNC: 133 MMOL/L (ref 135–144)
SPECIFIC GRAVITY UA: 1.03 (ref 1–1.03)
TOTAL PROTEIN: 7.6 G/DL (ref 6.4–8.3)
TRICHOMONAS: ABNORMAL
TURBIDITY: CLEAR
URINE HGB: NEGATIVE
UROBILINOGEN, URINE: NORMAL
WBC # BLD: 4.3 K/UL (ref 3.5–11.3)
WBC # BLD: NORMAL 10*3/UL
WBC UA: ABNORMAL /HPF (ref 0–5)
YEAST: ABNORMAL

## 2022-02-08 PROCEDURE — 73502 X-RAY EXAM HIP UNI 2-3 VIEWS: CPT

## 2022-02-08 PROCEDURE — 73130 X-RAY EXAM OF HAND: CPT

## 2022-02-08 PROCEDURE — 81001 URINALYSIS AUTO W/SCOPE: CPT

## 2022-02-08 PROCEDURE — 80053 COMPREHEN METABOLIC PANEL: CPT

## 2022-02-08 PROCEDURE — 6370000000 HC RX 637 (ALT 250 FOR IP): Performed by: STUDENT IN AN ORGANIZED HEALTH CARE EDUCATION/TRAINING PROGRAM

## 2022-02-08 PROCEDURE — 85055 RETICULATED PLATELET ASSAY: CPT

## 2022-02-08 PROCEDURE — 99285 EMERGENCY DEPT VISIT HI MDM: CPT

## 2022-02-08 PROCEDURE — 85025 COMPLETE CBC W/AUTO DIFF WBC: CPT

## 2022-02-08 PROCEDURE — 83690 ASSAY OF LIPASE: CPT

## 2022-02-08 RX ORDER — OXYCODONE HYDROCHLORIDE AND ACETAMINOPHEN 5; 325 MG/1; MG/1
1 TABLET ORAL ONCE
Status: COMPLETED | OUTPATIENT
Start: 2022-02-08 | End: 2022-02-08

## 2022-02-08 RX ORDER — SULFAMETHOXAZOLE AND TRIMETHOPRIM 800; 160 MG/1; MG/1
1 TABLET ORAL 2 TIMES DAILY
Qty: 14 TABLET | Refills: 0 | Status: SHIPPED | OUTPATIENT
Start: 2022-02-08 | End: 2022-02-15

## 2022-02-08 RX ORDER — ONDANSETRON 2 MG/ML
4 INJECTION INTRAMUSCULAR; INTRAVENOUS ONCE
Status: DISCONTINUED | OUTPATIENT
Start: 2022-02-08 | End: 2022-02-08

## 2022-02-08 RX ORDER — ONDANSETRON 4 MG/1
4 TABLET, ORALLY DISINTEGRATING ORAL ONCE
Status: COMPLETED | OUTPATIENT
Start: 2022-02-08 | End: 2022-02-08

## 2022-02-08 RX ORDER — SULFAMETHOXAZOLE AND TRIMETHOPRIM 800; 160 MG/1; MG/1
1 TABLET ORAL ONCE
Status: COMPLETED | OUTPATIENT
Start: 2022-02-08 | End: 2022-02-08

## 2022-02-08 RX ADMIN — ONDANSETRON 4 MG: 4 TABLET, ORALLY DISINTEGRATING ORAL at 10:28

## 2022-02-08 RX ADMIN — SULFAMETHOXAZOLE AND TRIMETHOPRIM 1 TABLET: 800; 160 TABLET ORAL at 13:41

## 2022-02-08 RX ADMIN — OXYCODONE HYDROCHLORIDE AND ACETAMINOPHEN 1 TABLET: 5; 325 TABLET ORAL at 09:44

## 2022-02-08 RX ADMIN — OXYCODONE HYDROCHLORIDE AND ACETAMINOPHEN 1 TABLET: 5; 325 TABLET ORAL at 12:15

## 2022-02-08 ASSESSMENT — ENCOUNTER SYMPTOMS
FACIAL SWELLING: 0
NAUSEA: 1
TROUBLE SWALLOWING: 0
VOICE CHANGE: 0
VOMITING: 0
BLOOD IN STOOL: 0
ABDOMINAL PAIN: 1
SHORTNESS OF BREATH: 0
DIARRHEA: 0
CONSTIPATION: 0

## 2022-02-08 ASSESSMENT — PAIN DESCRIPTION - ORIENTATION: ORIENTATION: LEFT

## 2022-02-08 ASSESSMENT — PAIN SCALES - GENERAL
PAINLEVEL_OUTOF10: 10

## 2022-02-08 ASSESSMENT — PAIN DESCRIPTION - LOCATION: LOCATION: HIP

## 2022-02-08 ASSESSMENT — PAIN DESCRIPTION - PROGRESSION: CLINICAL_PROGRESSION: GRADUALLY IMPROVING

## 2022-02-08 ASSESSMENT — PAIN DESCRIPTION - PAIN TYPE: TYPE: CHRONIC PAIN

## 2022-02-08 NOTE — ED NOTES
Pt stated she did not want to sit around and wait on paperwork because she was mad. Pt was told how important the antibiotics were and still stated she needed to go wait outside. Pt was taken out in wheelchair.       Jonny Forrest RN  02/08/22 [id number]

## 2022-02-08 NOTE — ED NOTES
Patient requesting to meet with SW regarding housing. Patient states she is homeless and currently living with her niece. SW had reviewed patient's progress notes and patient was seen by [facility], Inpatient SW, on 1/5/22 and provided with housing resources at that time. SW asked if patient had utilized/followed-up with these resources and patient stated she had not because she does not have a phone and walks with a walker. A short time later patient talked about calling her pain management doctor. SW asked how patient was able to call that physician and patient turned her head away from SW and would not answer. On patient's 1/5/22 admission patient was also given information about connecting back with [facility] to re-establish care. Patient would have access to a  who could work with patient on housing at [place] as well. Patient never did call or walk-in to [place] either. Patient appears to want someone to find her housing with no effort on her part, which is not possible. Since patient states she is having difficulty ambulating, patient updated that her [place] [employer] can provide her with para-transit vehicle transportation to appointments and patient still not responsive to SW.  SW asked if patient would like to talk with the ED CM, Ivelisse Das, about short-term rehab in a skilled nursing facility and patient declined. Patient also declined to talk further with SW.  Patient told that SW/CM would be happy to come back and talk with her if she changes her mind on wanting to participate in a care plan. Ned Carvalho.  Ashwin MoraNorthside Hospital [facility]  02/08/22 7831

## 2022-02-08 NOTE — ED PROVIDER NOTES
[facility]     Emergency Department     Faculty Attestation    I performed a history and physical examination of the patient and discussed management with the resident. I have reviewed and agree with the residents findings including all diagnostic interpretations, and treatment plans as written at the time of my review. Any areas of disagreement are noted on the chart. I was personally present for the key portions of any procedures. I have documented in the chart those procedures where I was not present during the key portions. For Physician Assistant/ Nurse Practitioner cases/documentation I have personally evaluated this patient and have completed at least one if not all key elements of the E/M (history, physical exam, and MDM). Additional findings are as noted. This patient was evaluated in the Emergency Department for symptoms described in the history of present illness. The patient was evaluated in the context of the global COVID-19 pandemic, which necessitated consideration that the patient might be at risk for infection with the SARS-CoV-2 virus that causes COVID-19. Institutional protocols and algorithms that pertain to the evaluation of patients at risk for COVID-19 are in a state of rapid change based on information released by regulatory bodies including the CDC and federal and state organizations. These policies and algorithms were followed during the patient's care in the ED. (Please note that portions of this note were completed with a voice recognition program.  Efforts were made to edit the dictations but occasionally words are mis-transcribed.)    Sukhjinder Watson.  Carlos Butler MD, [address] [facility]  Attending Emergency Medicine Physician        Khoi Dawkins MD  02/08/22 1013

## 2022-02-08 NOTE — ED PROVIDER NOTES
[address]  ED  Emergency Department Encounter  Emergency Medicine Resident     Pt Name: Luis Hernandez  QPR:[id number]  Adonaygfsurinder 1955  Date of evaluation: 2/8/22  PCP:  Erika Morrison MD    CHIEF COMPLAINT       Chief Complaint   Patient presents with    Hip Pain     left     Dental Pain     HISTORY OF PRESENT ILLNESS  (Location/Symptom, Timing/Onset, Context/Setting, Quality, Duration, ModifyingFactors, Severity.)      Emi Daniels is a 74 y.o. female with PMH of hypertension, alcoholic cirrhosis, diastolic heart failure, COPD, CVA, PE, avascular necrosis, pancreatic cancer who presents for evaluation of left hip pain, dental pain, abdominal pain, and nausea. Patient has chronic pain of his hip and was supposed to have hip replacement 1 year ago however was not medically cleared for surgery. Has had chronic pain since then, currently unchanged from baseline. No numbness, weakness, tingling. Patient also complaining of left upper dental pain for the past few days. No fever, chills, trismus, difficulty swallowing. Patient also complaining of diffuse abdominal pain and nausea. Patient had recent admission for this 1/3 to observation unit. CT at that time showed slight decrease in size of known pancreatic mass. States that she has been out of all her medications for the past month. No constipation, diarrhea, vomiting, urinary symptoms, vaginal bleeding/discharge.     PAST MEDICAL / SURGICAL / SOCIAL /FAMILY HISTORY      has a past medical history of Appendicitis, Cerebral artery occlusion with cerebral infarction ([address] CHF (congestive heart failure) (HCC), Chronic respiratory failure with hypoxia and hypercapnia (HCC), Chronic rhinitis, Cigarette smoker, COPD (chronic obstructive pulmonary disease) ([address] Depression, Dysphagia, Essential hypertension, GERD (gastroesophageal reflux disease), Heart failure, diastolic, with acute decompensation ([address] Hepatitis C, chronic ([address] History of smoking at least 1 pack per day for at least 30 years, Hyperlipidemia, Hypertension, Migraine, Moderate COPD (chronic obstructive pulmonary disease) ([facility] [address] Multiple transfusions, Neurogenic dysphagia, Osteoarthritis, Pneumonia, Scoliosis, and Substance abuse ([facility] [address] No other pertinent PMH on review with patient/guardian. has a past surgical history that includes  section; LEEP (); Upper gastrointestinal endoscopy (); Gastric bypass surgery; Appendectomy (2017); laparoscopic appendectomy (N/A, 3/27/2017); Upper gastrointestinal endoscopy (2018); Upper gastrointestinal endoscopy (N/A, 2018); pr colonoscopy w/biopsy single/multiple (N/A, 11/10/2018); pr egd transoral biopsy single/multiple (N/A, 2018); hip surgery (Right, 2019); Total hip arthroplasty (Right, 2019); Medication Injection (Left, 3/25/2021); Upper gastrointestinal endoscopy (N/A, 2021); other surgical history (Left, 2021); Medication Injection (Left, 2021); and Medication Injection (Left, 2021). No other pertinent PSH on review with patient/guardian.   Social History     Socioeconomic History    Marital status: Single     Spouse name: Not on file    Number of children: Not on file    Years of education: Not on file    Highest education level: Not on file   Occupational History    Not on file   Tobacco Use    Smoking status: Current Some Day Smoker     Packs/day: 0.50     Years: 40.00     Pack years: 20.00     Types: Cigarettes     Start date: 5     Last attempt to quit: 2018     Years since quitting: 3.2    Smokeless tobacco: Never Used   Vaping Use    Vaping Use: Never used   Substance and Sexual Activity    Alcohol use: No     Alcohol/week: 0.0 standard drinks    Drug use: No     Types: IV, Cocaine     Comment: past history    Sexual activity: Not on file   Other Topics Concern    Not on file   Social History Narrative    Not on file     Social Chevy Cook, APRN - CNP   ondansetron (ZOFRAN-ODT) 8 MG TBDP disintegrating tablet  9/23/21   Historical Provider, MD   mirtazapine (REMERON) 30 MG tablet  9/6/21   Historical Provider, MD [address] 4-1 % [facility]  9/13/21   Historical Provider, MD   imatinib (GLEEVEC) 400 MG chemo tablet  9/14/21   Historical Provider, MD   gabapentin (NEURONTIN) 300 MG capsule  8/31/21   Historical Provider, MD   famotidine (PEPCID) 20 MG tablet  8/24/21   Historical Provider, MD   butalbital-acetaminophen-caffeine (FIORICET, ESGIC) -40 MG per tablet Take 1 tablet by mouth every 12 hours for 14 days 5/13/21 9/27/21  E Andrey Bailey MD   ferrous sulfate (FE TABS 325) 325 (65 Fe) MG EC tablet Take 1 tablet by mouth daily 5/14/21   E Andrey Bailey MD   amitriptyline (ELAVIL) 10 MG tablet Take 1 tablet by mouth nightly for 14 days 5/13/21 9/27/21  ISIDORO Bailey MD   atorvastatin (LIPITOR) 40 MG tablet Take 1 tablet by mouth daily for 14 days 5/13/21 9/27/21  E Andrey Bailey MD   acetaminophen (TYLENOL) 500 MG tablet Take 2 tablets by mouth every 6 hours as needed for Pain 5/7/21   Tara Leung, DO   mometasone-formoterol (DULERA) 100-5 MCG/ACT inhaler Inhale 2 puffs into the lungs 2 times daily 4/6/21   Pampo Brilliant, DO   lisinopril (PRINIVIL;ZESTRIL) 10 MG tablet Take 1 tablet by mouth daily 4/6/21   Pamula Brilliant, DO   clopidogrel (PLAVIX) 75 MG tablet Take 1 tablet by mouth daily 4/6/21   Pamula Brilliant, DO   carvedilol (COREG) 6.25 MG tablet Take 1 tablet by mouth 2 times daily (with meals) 4/6/21   Pamula Brilliant, DO   atorvastatin (LIPITOR) 40 MG tablet Take 1 tablet by mouth nightly 4/6/21   Pamula Brilliant, DO   apixaban (ELIQUIS) 5 MG TABS tablet Take 1 tablet by mouth 2 times daily 4/6/21   Pamula Brilliant, DO   amitriptyline (ELAVIL) 10 MG tablet Take 1 tablet by mouth nightly 4/6/21   Pamula Brilliant, DO   albuterol sulfate HFA (PROVENTIL HFA) 108 (90 Base) MCG/ACT inhaler Inhale 1-2 puffs into the lungs once for 1 dose 4/6/21 9/27/21 Jessica Montiel, DO   amLODIPine (NORVASC) 10 MG tablet Take 1 tablet by mouth daily 2/9/21 2/9/21  Celeste Wong MD   [facility]. Devices [facility]) MISC 1 Device by Does not apply route once for 1 dose 8/5/19 6/6/21  Fletcher Gupta PA-C   ipratropium-albuterol (DUONEB) 0.5-2.5 (3) MG/3ML SOLN nebulizer solution Inhale 3 mLs into the lungs every 4 hours as needed for Shortness of Breath 1/11/19   Sima Salazar MD   vitamin B-1 100 MG tablet Take 1 tablet by mouth daily 10/13/18   Teresa Stevens MD   vitamin B-12 250 MCG tablet Take 1 tablet by mouth daily 10/13/18   Teresa Stevens MD   Multiple Vitamins-Minerals (THERAPEUTIC MULTIVITAMIN-MINERALS) tablet Take 1 tablet by mouth daily 8/12/18   Sandra Clark MD     REVIEW OF SYSTEMS    (2-9 systems for level 4, 10 ormore for level 5)      Review of Systems   Constitutional: Negative for chills and fever. HENT: Positive for dental problem. Negative for facial swelling, mouth sores, trouble swallowing and voice change. Eyes: Negative for visual disturbance. Respiratory: Negative for shortness of breath. Cardiovascular: Negative for chest pain. Gastrointestinal: Positive for abdominal pain and nausea. Negative for blood in stool, constipation, diarrhea and vomiting. Genitourinary: Negative for dysuria, frequency, urgency, vaginal bleeding and vaginal discharge. Skin: Negative for rash. Allergic/Immunologic: Negative for immunocompromised state. Neurological: Negative for headaches. Hematological: Does not bruise/bleed easily. PHYSICAL EXAM   (up to 7 for level 4, 8 or more for level 5)      INITIAL VITALS:   BP (!) 169/102   Pulse 89   Temp 97.5 °F (36.4 °C) (Oral)   Resp 16   LMP 03/18/2015   SpO2 96%     Physical Exam  Constitutional:       General: She is not in acute distress. Appearance: Normal appearance. She is not ill-appearing, toxic-appearing or diaphoretic. HENT:      Head: Normocephalic and atraumatic.       Right Ear: External ear normal.      Left Ear: External ear normal.   Eyes:      General:         Right eye: No discharge. Left eye: No discharge. Cardiovascular:      Rate and Rhythm: Normal rate and regular rhythm. Pulses: Normal pulses. Heart sounds: No murmur heard. Pulmonary:      Effort: Pulmonary effort is normal. No respiratory distress. Breath sounds: Normal breath sounds. No wheezing, rhonchi or rales. Abdominal:      Palpations: Abdomen is soft. Tenderness: There is abdominal tenderness (Diffuse tenderness that resolves with distractibility). There is no right CVA tenderness, left CVA tenderness, guarding or rebound. Musculoskeletal:      Comments: Tenderness of left lateral hip and pain with ROM. No femur, knee, lower leg/foot pain. 5/5 strength with plantarflexion, dorsiflexion, hip flexion. Sensation intact. Pedal pulse 2+. Skin:     Capillary Refill: Capillary refill takes less than 2 seconds. Neurological:      General: No focal deficit present. Mental Status: She is alert.        DIFFERENTIAL  DIAGNOSIS     PLAN (LABS / IMAGING / EKG):  Orders Placed This Encounter   Procedures    XR HIP 2-3 VW W PELVIS LEFT    XR HAND LEFT (MIN 3 VIEWS)    CBC Auto Differential    Comprehensive Metabolic Panel w/ Reflex to MG    Lipase    Urinalysis Reflex to Culture    Immature Platelet Fraction    Microscopic Urinalysis       MEDICATIONS ORDERED:  Orders Placed This Encounter   Medications    oxyCODONE-acetaminophen (PERCOCET) 5-325 MG per tablet 1 tablet    DISCONTD: ondansetron (ZOFRAN) injection 4 mg    ondansetron (ZOFRAN-ODT) disintegrating tablet 4 mg    oxyCODONE-acetaminophen (PERCOCET) 5-325 MG per tablet 1 tablet    sulfamethoxazole-trimethoprim (BACTRIM DS;SEPTRA DS) 800-160 MG per tablet 1 tablet     Order Specific Question:   Antimicrobial Indications     Answer:   Urinary Tract Infection     Order Specific Question:   Antimicrobial Indications Answer:   Head and Neck Infection    sulfamethoxazole-trimethoprim (BACTRIM DS;SEPTRA DS) 800-160 MG per tablet     Sig: Take 1 tablet by mouth 2 times daily for 7 days     Dispense:  14 tablet     Refill:  0       DIAGNOSTIC RESULTS / EMERGENCY DEPARTMENT COURSE / MDM     LABS:  Results for orders placed or performed during the hospital encounter of 02/08/22   CBC Auto Differential   Result Value Ref Range    WBC 4.3 3.5 - 11.3 k/uL    RBC 4.38 3.95 - 5.11 m/uL    Hemoglobin 14.5 11.9 - 15.1 g/dL    Hematocrit 44.7 36.3 - 47.1 %    .1 82.6 - 102.9 fL    MCH 33.1 25.2 - 33.5 pg    MCHC 32.4 28.4 - 34.8 g/dL    RDW 13.0 11.8 - 14.4 %    Platelets See Reflexed IPF Result 138 - 453 k/uL    MPV NOT REPORTED 8.1 - 13.5 fL    NRBC Automated 0.0 0.0 per 100 WBC    Differential Type NOT REPORTED     WBC Morphology NOT REPORTED     RBC Morphology NOT REPORTED     Platelet Estimate NOT REPORTED     Seg Neutrophils 57 36 - 65 %    Lymphocytes 31 24 - 43 %    Monocytes 9 3 - 12 %    Eosinophils % 3 1 - 4 %    Basophils 0 0 - 2 %    Immature Granulocytes 0 0 %    Segs Absolute 2.41 1.50 - 8.10 k/uL    Absolute Lymph # 1.31 1.10 - 3.70 k/uL    Absolute Mono # 0.40 0.10 - 1.20 k/uL    Absolute Eos # 0.12 0.00 - 0.44 k/uL    Basophils Absolute <0.03 0.00 - 0.20 k/uL    Absolute Immature Granulocyte <0.03 0.00 - 0.30 k/uL   Comprehensive Metabolic Panel w/ Reflex to MG   Result Value Ref Range    Glucose 97 70 - 99 mg/dL    BUN 12 8 - 23 mg/dL    CREATININE 0.47 (L) 0.50 - 0.90 mg/dL    Bun/Cre Ratio NOT REPORTED 9 - 20    Calcium 9.5 8.6 - 10.4 mg/dL    Sodium 133 (L) 135 - 144 mmol/L    Potassium 4.5 3.7 - 5.3 mmol/L    Chloride 100 98 - 107 mmol/L    CO2 23 20 - 31 mmol/L    Anion Gap 10 9 - 17 mmol/L    Alkaline Phosphatase 100 35 - 104 U/L    ALT 43 (H) 5 - 33 U/L    AST 57 (H) <32 U/L    Total Bilirubin 0.71 0.3 - 1.2 mg/dL    Total Protein 7.6 6.4 - 8.3 g/dL    Albumin 4.0 3.5 - 5.2 g/dL    Albumin/Globulin Ratio 1.1 1.0 - 2.5    GFR Non-African American >60 >60 mL/min    GFR African American >60 >60 mL/min    GFR Comment          GFR Staging NOT REPORTED    Lipase   Result Value Ref Range    Lipase 32 13 - 60 U/L   Urinalysis Reflex to Culture    Specimen: Urine, clean catch   Result Value Ref Range    Color, UA Dark Yellow (A) Yellow    Turbidity UA Clear Clear    Glucose, Ur NEGATIVE NEGATIVE    Bilirubin Urine NEGATIVE  Verified by ictotest. (A) NEGATIVE    Ketones, Urine TRACE (A) NEGATIVE    Specific Gravity, UA 1.034 (H) 1.005 - 1.030    Urine Hgb NEGATIVE NEGATIVE    pH, UA 5.5 5.0 - 8.0    Protein, UA 1+ (A) NEGATIVE    Urobilinogen, Urine Normal Normal    Nitrite, Urine POSITIVE (A) NEGATIVE    Leukocyte Esterase, Urine SMALL (A) NEGATIVE    Urinalysis Comments NOT REPORTED    Immature Platelet Fraction   Result Value Ref Range    Platelet, Immature Fraction 3.2 1.1 - 10.3 %    Platelet, Fluorescence 132 (L) 138 - 453 k/uL   Microscopic Urinalysis   Result Value Ref Range    -          WBC, UA 5 TO 10 0 - 5 /HPF    RBC, UA 0 TO 2 0 - 2 /HPF    Casts UA HYALINE 0 - 2 /LPF    Casts UA 0 TO 2 0 - 2 /LPF    Crystals, UA NOT REPORTED None /HPF    Epithelial Cells UA None 0 - 5 /HPF    Renal Epithelial, UA NOT REPORTED 0 /HPF    Bacteria, UA FEW (A) None    Mucus, UA 2+ (A) None    Trichomonas, UA NOT REPORTED None    Amorphous, UA NOT REPORTED None    Other Observations UA NOT REPORTED NOT REQ. Yeast, UA NOT REPORTED None       IMPRESSION/MDM/ED COURSE:  74 y.o. female presented with left hip pain, dental pain, abdominal pain, and nausea. Patient afebrile. /102 however patient is out of her antihypertensive medications. Vitals otherwise WNL. On exam patient resting with no acute distress, nontoxic appearing. Heart RRR, lungs clear. Diffuse abdominal tenderness that does improve with distractibility. No CVA tenderness. Tenderness of left lateral hip and pain with ROM. Neurovascularly intact distally.   Will obtain abdominal pain lab work and UA as well as left hip x-ray. No chest pain or shortness of breath therefore not feel cardiac work-up necessary at this time. I did not order a pregnancy test because patient is postmenopausal.    ED Course as of 02/08/22 1342   Tue Feb 08, 2022   1118 IMPRESSION:  No acute abnormality.     Interval worsening changes associated with suspected left hip osteonecrosis,  as described.     Slightly greater heterotopic bone adjacent to right hip hardware status post  THR. [AF]   4168 Comprehensive Metabolic Panel w/ Reflex to MG(!):    Glucose 97   BUN 12   Creatinine 0.47(!)   Bun/Cre Ratio NOT REPORTED   CALCIUM, SERUM, 763898 9.5   Sodium 133(!)   Potassium 4.5   Chloride 100   CO2 23   Anion Gap 10   Alk Phos 100   ALT 43(!)   AST 57(!)   Bilirubin 0.71   Total Protein 7.6   Albumin 4.0   Albumin/Globulin Ratio 1.1   GFR Non- >60   GFR  >60   GFR Comment        GFR Staging NOT REPORTED [AF]   1133 Immature Platelet Fraction(!):    Platelet, Immature Fraction 3.2   Platelet, Fluorescence 132(!) [AF]   1133 Lipase:    Lipase 32 [AF]   1133 CBC Auto Differential:    WBC 4.3   RBC 4.38   Hemoglobin Quant 14.5   Hematocrit 44.7   .1   MCH 33.1   MCHC 32.4   RDW 13.0   Platelet Count See Reflexed IPF Result   MPV NOT REPORTED   NRBC Automated 0.0   Differential Type NOT REPORTED   WBC Morphology NOT REPORTED   RBC Morphology NOT REPORTED   Platelet Estimate NOT REPORTED   Seg Neutrophils 57   Lymphocytes 31   Monocytes 9   Eosinophils % 3   Basophils 0   Immature Granulocytes 0   Segs Absolute 2.41   Absolute Lymph # 1.31   Absolute Mono # 0.40   Absolute Eos # 0.12   Basophils Absolute <0.03   Absolute Immature Granulocyte <0.03 [AF]   1234 Left hand x-ray ordered on wrong patient in error.   Discussed with XR who will check patient does not get billed for this x-ray [AF]   1301 Urinalysis Reflex to Culture(!):    Color, UA Dark Yellow(!)   Turbidity UA Clear   Glucose, UA NEGATIVE   Bilirubin, Urine NEGATIVE  Verified by ictotest.(!)   Ketones, Urine TRACE(!)   Specific [place], UA 1.034(!)   Urine Hgb NEGATIVE   pH, UA 5.5   Protein, UA 1+(!)   Urobilinogen, Urine Normal   Nitrite, Urine POSITIVE(!)   Leukocyte Esterase, Urine SMALL(!)   Urinalysis Comments NOT REPORTED [AF]   1317 Microscopic Urinalysis(!):    -        WBC, UA 5 TO 10   RBC, UA 0 TO 2   Casts UA HYALINE   Casts UA 0 TO 2   Crystals, UA NOT REPORTED   Epithelial Cells, UA None   Renal Epithelial, UA NOT REPORTED   Bacteria, UA FEW(!)   Mucus, UA 2+(!)   Trichomonas, UA NOT REPORTED   Amorphous, UA NOT REPORTED   Other Observations UA NOT REPORTED   Yeast, Urine NOT REPORTED [AF]   1317 Urinalysis Reflex to Culture(!):    Color, UA Dark Yellow(!)   Turbidity UA Clear   Glucose, UA NEGATIVE   Bilirubin, Urine NEGATIVE  Verified by ictotest.(!)   Ketones, Urine TRACE(!)   Specific [place], UA 1.034(!)   Urine Hgb NEGATIVE   pH, UA 5.5   Protein, UA 1+(!)   Urobilinogen, Urine Normal   Nitrite, Urine POSITIVE(!)   Leukocyte Esterase, Urine SMALL(!)   Urinalysis Comments NOT REPORTED [AF]   1341 Discussed antibiotic coverage with pharmacy who recommends Bactrim for coverage of both the dental infection and UTI. Patient has dental appointment in 2 days. Recommend follow-up with PCP, Ortho, and pain management. Had long discussion with patient about her need to reschedule appointments and rides which are covered by her insurance. Patient expresses understanding. Tylenol, ibuprofen as needed for pain. I discussed signs and symptoms that would require reevaluation in the ED. The patient expressed understanding and agreement with plan. All questions answered. [AF]      ED Course User Index  [AF] Quyen Feliz,      RADIOLOGY:  XR HAND LEFT (MIN 3 VIEWS)   Final Result   No acute abnormality, with post prior amputation, mild soft tissue and   degenerative changes, as above.          XR HIP 2-3 VW W PELVIS LEFT   Final Result   No acute abnormality. Interval worsening changes associated with suspected left hip osteonecrosis,   as described. Slightly greater heterotopic bone adjacent to right hip hardware status post   THR. EKG  None    All EKG's are interpreted by the Emergency Department Physician who either signs or Co-signs this chart in the absence of a cardiologist.    PROCEDURES:  None    CONSULTS:  None    FINAL IMPRESSION      1. Acute cystitis without hematuria    2. Chronic left hip pain          DISPOSITION / PLAN     DISPOSITION        PATIENT REFERREDTO:  Gloria Cordon MD  [address]   [address]  [address]  [phone]    Schedule an appointment as soon as possible for a visit       [address]  [address] [address]  [phone]  Schedule an appointment as soon as possible for a visit         DISCHARGE MEDICATIONS:  New Prescriptions    SULFAMETHOXAZOLE-TRIMETHOPRIM (BACTRIM DS;SEPTRA DS) 800-160 MG PER TABLET    Take 1 tablet by mouth 2 times daily for 7 days       Darshan Martinez DO  PGY 2  Resident Physician Emergency Medicine  02/08/22 1:42 PM    (Please note that portions of this note were completed with a voice recognition program.Efforts were made to edit the dictations but occasionally words are mis-transcribed.)       Malina Stephens DO  Resident  02/08/22 [id number]

## 2022-02-08 NOTE — ED TRIAGE NOTES
Pt arrived to the ED with c/o left hip pain. Pt states she spoke with a doctor a year ago regarding surgery but they stated it would be good to do surgery based off pt's health. Pt states she is living at different spots and doesn't have meds to take or her hop pain. Pt also states she has dental pain and a hx of cancer. Pt is alert and oriented x4. Pt walks with a walker and came back to the room in wheelchair.

## 2022-02-08 NOTE — ED PROVIDER NOTES
[facility] ED  Emergency Department  Senior Resident Attestation     Primary Care Physician  Ameena Connor MD    I performed a history and physical examination of the patient and discussed management with the jamie resident. I reviewed the jamie residents note and agree with the documented findings and plan of care. Any areas of disagreement are noted on the chart. Case was then discussed with Faculty Attending Supervisor for additional medical management. PERTINENT ATTENDING PHYSICIAN COMMENTS:    HISTORY:   Emi Daniels is a 74 y.o. female who  has a past medical history of Appendicitis (3/27/2017), Cerebral artery occlusion with cerebral infarction ([address] CHF (congestive heart failure) ([address] Chronic respiratory failure with hypoxia and hypercapnia ([address] Chronic rhinitis, Cigarette smoker, COPD (chronic obstructive pulmonary disease) ([address] (8/5/2018), Depression, Dysphagia, Essential hypertension (8/11/2018), GERD (gastroesophageal reflux disease), Heart failure, diastolic, with acute decompensation ([address] (9/5/2018), Hepatitis C, chronic ([address] History of smoking at least 1 pack per day for at least 30 years, Hyperlipidemia, Hypertension, Migraine, Moderate COPD (chronic obstructive pulmonary disease) ([address] Multiple transfusions, Neurogenic dysphagia, Osteoarthritis, Pneumonia, Scoliosis, and Substance abuse ([address] and presents with complaint of multitude complaints including chronic left hip pain previously told she needs replacement but was not cleared for surgery, dental pain, abdominal pain with known pancreatic cancer, and nausea. Nothing new or different about nausea or abdominal pain. Has not been taking her medications or following up with oncology. Still making urine and tolerating p.o. intake.     PHYSICAL:   Temp: 97.5 °F (36.4 °C),  Pulse: 89, Resp: 16, BP: (!) 169/102, SpO2: 94 %  Gen: Non-toxic, Afebrile, chronically ill-appearing  Neck: Supple  Cards: Regular rate and rhythm  Pulm: Lung sounds clear to auscultation  Abdomen: Mild epigastric tenderness, soft, nonperitoneal  Skin: warm, dry  Extremities: pulses 2+ radial / dorsalis pedis, no clubbing, cyanosis, edema    IMPRESSION:   Possible dental abscess  Osteoarthritis of hip  Chronic abdominal pain and nausea secondary to known pancreatic cancer    PLAN:   Antibiotics for dental abscess, nothing to drain  Analgesics for osteoarthritis  Abdominal labs for signs of obstruction or need for intervention    CRITICAL CARE TIME:    None    Maicol Franklin MD  Senior Resident Physician    (Please note that portions of this note were completed with a voice recognition program.  Efforts were made to edit the dictations but occasionally words are mis-transcribed.)      Maicol Franklin MD  Resident  02/09/22 8230

## 2022-03-04 ENCOUNTER — HOSPITAL ENCOUNTER (INPATIENT)
Age: 67
LOS: 4 days | Discharge: NURSING, REHAB OR LONG-TERM CARE | DRG: 861 | End: 2022-03-08
Attending: EMERGENCY MEDICINE | Admitting: EMERGENCY MEDICINE
Payer: MEDICARE

## 2022-03-04 ENCOUNTER — APPOINTMENT (OUTPATIENT)
Dept: GENERAL RADIOLOGY | Age: 67
DRG: 861 | End: 2022-03-04
Payer: MEDICARE

## 2022-03-04 DIAGNOSIS — Z65.9 SOCIAL PROBLEM: ICD-10-CM

## 2022-03-04 DIAGNOSIS — G43.809 OTHER MIGRAINE WITHOUT STATUS MIGRAINOSUS, NOT INTRACTABLE: Primary | ICD-10-CM

## 2022-03-04 DIAGNOSIS — M16.10 HIP ARTHRITIS: ICD-10-CM

## 2022-03-04 PROBLEM — G43.109 MIGRAINE AURA WITHOUT HEADACHE: Status: ACTIVE | Noted: 2022-03-04

## 2022-03-04 LAB
ABSOLUTE EOS #: 0.11 K/UL (ref 0–0.44)
ABSOLUTE IMMATURE GRANULOCYTE: <0.03 K/UL (ref 0–0.3)
ABSOLUTE LYMPH #: 1.82 K/UL (ref 1.1–3.7)
ABSOLUTE MONO #: 0.37 K/UL (ref 0.1–1.2)
ALBUMIN SERPL-MCNC: 5.2 G/DL (ref 3.5–5.2)
ALBUMIN/GLOBULIN RATIO: 1.1 (ref 1–2.5)
ALP BLD-CCNC: 128 U/L (ref 35–104)
ALT SERPL-CCNC: 30 U/L (ref 5–33)
ANION GAP SERPL CALCULATED.3IONS-SCNC: 18 MMOL/L (ref 9–17)
AST SERPL-CCNC: 54 U/L
BASOPHILS # BLD: 1 % (ref 0–2)
BASOPHILS ABSOLUTE: 0.03 K/UL (ref 0–0.2)
BILIRUB SERPL-MCNC: 0.62 MG/DL (ref 0.3–1.2)
BILIRUBIN DIRECT: 0.27 MG/DL
BILIRUBIN, INDIRECT: 0.35 MG/DL (ref 0–1)
BUN BLDV-MCNC: 17 MG/DL (ref 8–23)
CALCIUM SERPL-MCNC: 10.3 MG/DL (ref 8.6–10.4)
CHLORIDE BLD-SCNC: 103 MMOL/L (ref 98–107)
CO2: 21 MMOL/L (ref 20–31)
CREAT SERPL-MCNC: 0.8 MG/DL (ref 0.5–0.9)
EKG ATRIAL RATE: 90 BPM
EKG P AXIS: 115 DEGREES
EKG P-R INTERVAL: 152 MS
EKG Q-T INTERVAL: 392 MS
EKG QRS DURATION: 68 MS
EKG QTC CALCULATION (BAZETT): 479 MS
EKG R AXIS: 135 DEGREES
EKG T AXIS: 124 DEGREES
EKG VENTRICULAR RATE: 90 BPM
EOSINOPHILS RELATIVE PERCENT: 2 % (ref 1–4)
GFR AFRICAN AMERICAN: >60 ML/MIN
GFR NON-AFRICAN AMERICAN: >60 ML/MIN
GFR SERPL CREATININE-BSD FRML MDRD: ABNORMAL ML/MIN/{1.73_M2}
GLUCOSE BLD-MCNC: 83 MG/DL (ref 70–99)
HCT VFR BLD CALC: 53.3 % (ref 36.3–47.1)
HEMOGLOBIN: 17.2 G/DL (ref 11.9–15.1)
IMMATURE GRANULOCYTES: 0 %
LIPASE: 73 U/L (ref 13–60)
LYMPHOCYTES # BLD: 38 % (ref 24–43)
MCH RBC QN AUTO: 32.6 PG (ref 25.2–33.5)
MCHC RBC AUTO-ENTMCNC: 32.3 G/DL (ref 28.4–34.8)
MCV RBC AUTO: 100.9 FL (ref 82.6–102.9)
MONOCYTES # BLD: 8 % (ref 3–12)
NRBC AUTOMATED: 0 PER 100 WBC
PDW BLD-RTO: 13.2 % (ref 11.8–14.4)
PLATELET # BLD: 319 K/UL (ref 138–453)
PMV BLD AUTO: 9.5 FL (ref 8.1–13.5)
POTASSIUM SERPL-SCNC: 3.9 MMOL/L (ref 3.7–5.3)
PRO-BNP: 381 PG/ML
RBC # BLD: 5.28 M/UL (ref 3.95–5.11)
SARS-COV-2, RAPID: NOT DETECTED
SEG NEUTROPHILS: 51 % (ref 36–65)
SEGMENTED NEUTROPHILS ABSOLUTE COUNT: 2.48 K/UL (ref 1.5–8.1)
SODIUM BLD-SCNC: 142 MMOL/L (ref 135–144)
SPECIMEN DESCRIPTION: NORMAL
TOTAL PROTEIN: 10.1 G/DL (ref 6.4–8.3)
TROPONIN, HIGH SENSITIVITY: 12 NG/L (ref 0–14)
WBC # BLD: 4.8 K/UL (ref 3.5–11.3)

## 2022-03-04 PROCEDURE — 84484 ASSAY OF TROPONIN QUANT: CPT

## 2022-03-04 PROCEDURE — 71045 X-RAY EXAM CHEST 1 VIEW: CPT

## 2022-03-04 PROCEDURE — 2580000003 HC RX 258: Performed by: STUDENT IN AN ORGANIZED HEALTH CARE EDUCATION/TRAINING PROGRAM

## 2022-03-04 PROCEDURE — G0378 HOSPITAL OBSERVATION PER HR: HCPCS

## 2022-03-04 PROCEDURE — 83880 ASSAY OF NATRIURETIC PEPTIDE: CPT

## 2022-03-04 PROCEDURE — 83690 ASSAY OF LIPASE: CPT

## 2022-03-04 PROCEDURE — 96375 TX/PRO/DX INJ NEW DRUG ADDON: CPT

## 2022-03-04 PROCEDURE — 6370000000 HC RX 637 (ALT 250 FOR IP): Performed by: STUDENT IN AN ORGANIZED HEALTH CARE EDUCATION/TRAINING PROGRAM

## 2022-03-04 PROCEDURE — 85025 COMPLETE CBC W/AUTO DIFF WBC: CPT

## 2022-03-04 PROCEDURE — 96374 THER/PROPH/DIAG INJ IV PUSH: CPT

## 2022-03-04 PROCEDURE — 6370000000 HC RX 637 (ALT 250 FOR IP): Performed by: HEALTH CARE PROVIDER

## 2022-03-04 PROCEDURE — 99283 EMERGENCY DEPT VISIT LOW MDM: CPT

## 2022-03-04 PROCEDURE — 1200000000 HC SEMI PRIVATE

## 2022-03-04 PROCEDURE — 87635 SARS-COV-2 COVID-19 AMP PRB: CPT

## 2022-03-04 PROCEDURE — 6360000002 HC RX W HCPCS: Performed by: STUDENT IN AN ORGANIZED HEALTH CARE EDUCATION/TRAINING PROGRAM

## 2022-03-04 PROCEDURE — 80076 HEPATIC FUNCTION PANEL: CPT

## 2022-03-04 PROCEDURE — 80048 BASIC METABOLIC PNL TOTAL CA: CPT

## 2022-03-04 RX ORDER — POTASSIUM CHLORIDE 7.45 MG/ML
10 INJECTION INTRAVENOUS PRN
Status: DISCONTINUED | OUTPATIENT
Start: 2022-03-04 | End: 2022-03-08 | Stop reason: HOSPADM

## 2022-03-04 RX ORDER — CARVEDILOL 6.25 MG/1
6.25 TABLET ORAL 2 TIMES DAILY WITH MEALS
Status: DISCONTINUED | OUTPATIENT
Start: 2022-03-04 | End: 2022-03-08 | Stop reason: HOSPADM

## 2022-03-04 RX ORDER — BUDESONIDE AND FORMOTEROL FUMARATE DIHYDRATE 80; 4.5 UG/1; UG/1
2 AEROSOL RESPIRATORY (INHALATION) 2 TIMES DAILY
Status: DISCONTINUED | OUTPATIENT
Start: 2022-03-04 | End: 2022-03-08 | Stop reason: HOSPADM

## 2022-03-04 RX ORDER — SODIUM CHLORIDE 9 MG/ML
25 INJECTION, SOLUTION INTRAVENOUS PRN
Status: DISCONTINUED | OUTPATIENT
Start: 2022-03-04 | End: 2022-03-08 | Stop reason: HOSPADM

## 2022-03-04 RX ORDER — SODIUM CHLORIDE 0.9 % (FLUSH) 0.9 %
5-40 SYRINGE (ML) INJECTION EVERY 12 HOURS SCHEDULED
Status: DISCONTINUED | OUTPATIENT
Start: 2022-03-04 | End: 2022-03-08 | Stop reason: HOSPADM

## 2022-03-04 RX ORDER — PROCHLORPERAZINE EDISYLATE 5 MG/ML
10 INJECTION INTRAMUSCULAR; INTRAVENOUS ONCE
Status: COMPLETED | OUTPATIENT
Start: 2022-03-04 | End: 2022-03-04

## 2022-03-04 RX ORDER — DIPHENHYDRAMINE HYDROCHLORIDE 50 MG/ML
25 INJECTION INTRAMUSCULAR; INTRAVENOUS ONCE
Status: COMPLETED | OUTPATIENT
Start: 2022-03-04 | End: 2022-03-04

## 2022-03-04 RX ORDER — ACETAMINOPHEN 650 MG/1
650 SUPPOSITORY RECTAL EVERY 6 HOURS PRN
Status: DISCONTINUED | OUTPATIENT
Start: 2022-03-04 | End: 2022-03-04

## 2022-03-04 RX ORDER — ACETAMINOPHEN 500 MG
1000 TABLET ORAL EVERY 6 HOURS PRN
Status: DISCONTINUED | OUTPATIENT
Start: 2022-03-04 | End: 2022-03-08 | Stop reason: HOSPADM

## 2022-03-04 RX ORDER — ONDANSETRON 2 MG/ML
4 INJECTION INTRAMUSCULAR; INTRAVENOUS EVERY 4 HOURS PRN
Status: DISCONTINUED | OUTPATIENT
Start: 2022-03-04 | End: 2022-03-05

## 2022-03-04 RX ORDER — POLYETHYLENE GLYCOL 3350 17 G/17G
17 POWDER, FOR SOLUTION ORAL DAILY PRN
Status: DISCONTINUED | OUTPATIENT
Start: 2022-03-04 | End: 2022-03-08 | Stop reason: HOSPADM

## 2022-03-04 RX ORDER — SODIUM CHLORIDE 9 MG/ML
INJECTION, SOLUTION INTRAVENOUS CONTINUOUS
Status: DISCONTINUED | OUTPATIENT
Start: 2022-03-04 | End: 2022-03-04

## 2022-03-04 RX ORDER — ACETAMINOPHEN 325 MG/1
650 TABLET ORAL EVERY 6 HOURS PRN
Status: DISCONTINUED | OUTPATIENT
Start: 2022-03-04 | End: 2022-03-04

## 2022-03-04 RX ORDER — CLOPIDOGREL BISULFATE 75 MG/1
75 TABLET ORAL DAILY
Status: DISCONTINUED | OUTPATIENT
Start: 2022-03-05 | End: 2022-03-08 | Stop reason: HOSPADM

## 2022-03-04 RX ORDER — DEXAMETHASONE SODIUM PHOSPHATE 10 MG/ML
10 INJECTION INTRAMUSCULAR; INTRAVENOUS ONCE
Status: COMPLETED | OUTPATIENT
Start: 2022-03-04 | End: 2022-03-04

## 2022-03-04 RX ORDER — OXYCODONE HYDROCHLORIDE 5 MG/1
2.5 TABLET ORAL EVERY 6 HOURS PRN
Status: DISCONTINUED | OUTPATIENT
Start: 2022-03-04 | End: 2022-03-06

## 2022-03-04 RX ORDER — CARVEDILOL 6.25 MG/1
6.25 TABLET ORAL 2 TIMES DAILY WITH MEALS
Status: DISCONTINUED | OUTPATIENT
Start: 2022-03-05 | End: 2022-03-04

## 2022-03-04 RX ORDER — SODIUM CHLORIDE 0.9 % (FLUSH) 0.9 %
5-40 SYRINGE (ML) INJECTION PRN
Status: DISCONTINUED | OUTPATIENT
Start: 2022-03-04 | End: 2022-03-08 | Stop reason: HOSPADM

## 2022-03-04 RX ORDER — METHOCARBAMOL 750 MG/1
750 TABLET, FILM COATED ORAL ONCE
Status: COMPLETED | OUTPATIENT
Start: 2022-03-05 | End: 2022-03-05

## 2022-03-04 RX ORDER — POTASSIUM CHLORIDE 20 MEQ/1
40 TABLET, EXTENDED RELEASE ORAL PRN
Status: DISCONTINUED | OUTPATIENT
Start: 2022-03-04 | End: 2022-03-08 | Stop reason: HOSPADM

## 2022-03-04 RX ORDER — ATORVASTATIN CALCIUM 40 MG/1
40 TABLET, FILM COATED ORAL NIGHTLY
Status: DISCONTINUED | OUTPATIENT
Start: 2022-03-04 | End: 2022-03-08 | Stop reason: HOSPADM

## 2022-03-04 RX ORDER — AMITRIPTYLINE HYDROCHLORIDE 10 MG/1
10 TABLET, FILM COATED ORAL NIGHTLY
Status: DISCONTINUED | OUTPATIENT
Start: 2022-03-04 | End: 2022-03-08 | Stop reason: HOSPADM

## 2022-03-04 RX ADMIN — DIPHENHYDRAMINE HYDROCHLORIDE 25 MG: 50 INJECTION, SOLUTION INTRAMUSCULAR; INTRAVENOUS at 18:08

## 2022-03-04 RX ADMIN — CARVEDILOL 6.25 MG: 6.25 TABLET, FILM COATED ORAL at 22:32

## 2022-03-04 RX ADMIN — AMITRIPTYLINE HYDROCHLORIDE 10 MG: 10 TABLET, FILM COATED ORAL at 22:32

## 2022-03-04 RX ADMIN — PROCHLORPERAZINE EDISYLATE 10 MG: 5 INJECTION INTRAMUSCULAR; INTRAVENOUS at 18:05

## 2022-03-04 RX ADMIN — DEXAMETHASONE SODIUM PHOSPHATE 10 MG: 10 INJECTION INTRAMUSCULAR; INTRAVENOUS at 18:07

## 2022-03-04 RX ADMIN — SODIUM CHLORIDE, PRESERVATIVE FREE 10 ML: 5 INJECTION INTRAVENOUS at 22:33

## 2022-03-04 RX ADMIN — DESMOPRESSIN ACETATE 40 MG: 0.2 TABLET ORAL at 22:32

## 2022-03-04 ASSESSMENT — ENCOUNTER SYMPTOMS
DIARRHEA: 0
RHINORRHEA: 0
BACK PAIN: 0
VOMITING: 0
NAUSEA: 1
ABDOMINAL PAIN: 0
PHOTOPHOBIA: 1
SHORTNESS OF BREATH: 1

## 2022-03-04 ASSESSMENT — PAIN DESCRIPTION - DESCRIPTORS: DESCRIPTORS: SHARP

## 2022-03-04 ASSESSMENT — PAIN DESCRIPTION - LOCATION: LOCATION: HIP

## 2022-03-04 ASSESSMENT — PAIN SCALES - GENERAL
PAINLEVEL_OUTOF10: 8
PAINLEVEL_OUTOF10: 9

## 2022-03-04 NOTE — ED PROVIDER NOTES
[facility]  Emergency Department  Faculty Attestation     I performed a history and physical examination of the patient and discussed management with the resident. I reviewed the residents note and agree with the documented findings and plan of care. Any areas of disagreement are noted on the chart. I was personally present for the key portions of any procedures. I have documented in the chart those procedures where I was not present during the key portions. I have reviewed the emergency nurses triage note. I agree with the chief complaint, past medical history, past surgical history, allergies, medications, social and family history as documented unless otherwise noted below. For Physician Assistant/ Nurse Practitioner cases/documentation I have personally evaluated this patient and have completed at least one if not all key elements of the E/M (history, physical exam, and MDM). Additional findings are as noted. Primary Care Physician:  Amilcar Duke MD    Screenings:  [unfilled]    CHIEF COMPLAINT       Chief Complaint   Patient presents with    Migraine    Hip Pain       RECENT VITALS:   Temp: 98.2 °F (36.8 °C),  Pulse: 97, Resp: 18, BP: (!) 156/99    LABS:  Labs Reviewed   CBC WITH AUTO DIFFERENTIAL   BASIC METABOLIC PANEL W/ REFLEX TO MG FOR LOW K   TROPONIN   BRAIN NATRIURETIC PEPTIDE   HEPATIC FUNCTION PANEL   LIPASE       Radiology  XR CHEST PORTABLE    (Results Pending)       CRITICAL CARE: There was a high probability of clinically significant/life threatening deterioration in this patient's condition which required my urgent intervention. Total critical care time was none minutes. This excludes any time for separately reportable procedures. EKG:      Attending Physician Additional  Notes    Patient has multiple complaints. Her sister apparently threw away all of her medications and her cane.   Patient has 3 days of a constant headache with photophobia and nausea similar to prior migraines. No stroke symptoms stiff neck or trauma. No sudden onset. She has chronic left hip pain is having difficulty ambulating without her cane. She has a history of pancreatic cancer and is on chemotherapy but has not had access to her medications. She also complains of intermittent well localized left pectoral chest discomfort without radiation. She has persistent shortness of breath over the past several days but no cough sputum or hemoptysis. No fevers chills or sweats. She also has a knot above her left canine which is eroded to the gumline. There is tenderness when she touches it and sensitivity to cold. On exam she is cachectic, uncomfortable, hypertensive, afebrile, GCS 15. Neck is supple. Normal pupils and extraocular movements. Motor strength is symmetrical.  Face is symmetrical.  Lungs are diminished on the left. No wheezes or rales. No excess muscle use or retractions. No JVD or gallop. No edema cords Homans or calf tenderness. Abdomen is soft but tender in the epigastrium with localized guarding. She has gingival swelling and tenderness on the left canine region with a tooth erosion with caries at the root. Impression is status migrainosus, chronic left hip pain/arthritis, atypical chest pain, dyspnea as yet unexplained, out of medications, pancreatic cancer, odontogenic infection. Plan is IV access, migraine cocktail including Decadron, fluids, labs, chest x-ray, EKG, troponins, social work consultation, reassess. Consider discharge if she has housing complete resolution in her symptoms and access to her medications otherwise anticipated mission. Hammad Robles.  Virgil Esparza MD, [address] [facility]  Attending Emergency  Physician               Ronn Fairbanks MD  03/04/22 1405

## 2022-03-04 NOTE — FLOWSHEET NOTE
SPIRITUAL CARE DEPARTMENT - [address] [address] [address]  PROGRESS NOTE    Shift date: 03/04/2022  Shift day: Friday   Shift # 2    Room # 39/39   Name: Flores Orona            Age: 74 y.o. Gender: female          Mormonism: Non Jehovah's witness   Place of Rastafarian:     Referral: Routine Visit    Admit Date & Time: 3/4/2022  4:46 PM    PATIENT/EVENT DESCRIPTION:  Jessica Daniels is a 74 y.o. female who is at ED for migraine and hip pain. SPIRITUAL ASSESSMENT/INTERVENTION:   provided emotional support and active listening for pt. Who reports that she is very tired and asks  for a blanket and to turn the lights down and close the curtain. Pt. Is open to prayer and emotional support. SPIRITUAL CARE FOLLOW-UP PLAN:  Chaplains will remain available to offer spiritual and emotional support as needed. Electronically signed by Lashae Lozano on 3/4/2022 at 5:51 PM.  [facility]  [phone]     03/04/22 6268   Encounter Summary   Services provided to: Patient   Referral/Consult From: [facility]   Place of Mormonism Non Denomination   Continue Visiting   (03/04/2022)   Complexity of Encounter Low   Length of Encounter 15 minutes   Spiritual Assessment Completed Yes   Routine   Type Initial   Assessment Calm; Approachable; Hopeful   Intervention Active listening;Explored feelings, thoughts, concerns;Explored coping resources;Nurtured hope;Prayer;Sustaining presence/ Ministry of presence   Outcome Comfort;Expressed gratitude;Coping

## 2022-03-04 NOTE — ED NOTES
Pt states she is having L hip pain that she needs hip replacement on, and a migraine that began 2 days ago.  Pt also states her sister has been throwing out her meds and pt states she has nowhere to stay      Ernesto ANTELMO Avina  03/04/22 [address]

## 2022-03-04 NOTE — FLOWSHEET NOTE
Pt states she is having L hip pain that she needs hip replacement on, and a migraine that began 2 days ago.  Pt also states her sister has been throwing out her meds and pt states she has nowhere to stay

## 2022-03-04 NOTE — ED NOTES
The following labs labeled with pt sticker and tubed to lab:     [] Blue     [x] Lavender   [] on ice  [x] Green/yellow  [] Green/black [] on ice  [] Yellow  [] Red  [] Pink      [] COVID-19 swab    [] Rapid  [] PCR  [] Flu swab  [] Peds Viral Panel     [] Urine Sample  [] Pelvic Cultures  [] Blood Cultures            Romel [facility]ipple[facility]  03/04/22 4415

## 2022-03-04 NOTE — ED NOTES
LSW met with patient about concerns at home. Patient stated she lives with her sister but won't return home with her. She states she is homeless. Patient stated her niece has been working with her on finding new housing but has not been successful. Patient stated her sister has thrown out her medication and took away her cane. Patient stated without her cane, she is not able to get around on her own. Patient stated she has contacted her insurance about scheduling rides to appts but has difficulty at times. Patient denied going to [facility] for follow up appointments and stated she has left messages with  about needs in the community. Patient stated she needs a nursing home. She stated she has been to [facility] before and would like to go back. Patient is observation. Case Management notified for discharge planning needs.      [place], [place]  03/04/22 5413

## 2022-03-04 NOTE — ED NOTES
Dr Fatimah Armendariz aware of no IV access, spoke with pt regarding potentially giving meds IM. RN is coming down to attempt US IV.       Devyn Spence RN  03/04/22 4883

## 2022-03-04 NOTE — ED PROVIDER NOTES
[facility] ED  Emergency Department Encounter  EmergencyMedicine Resident     Pt Name:Emi Blanc  MRN: [id number]  [place] 1955  Date of evaluation: 3/4/22  PCP:  Don Cabrera MD    This patient was evaluated in the Emergency Department for symptoms described in the history of present illness. The patient was evaluated in the context of the global COVID-19 pandemic, which necessitated consideration that the patient might be at risk for infection with the SARS-CoV-2 virus that causes COVID-19. Institutional protocols and algorithms that pertain to the evaluation of patients at risk for COVID-19 are in a state of rapid change based on information released by regulatory bodies including the CDC and federal and state organizations. These policies and algorithms were followed during the patient's care in the ED. CHIEF COMPLAINT       Chief Complaint   Patient presents with    Migraine    Hip Pain       HISTORY OF PRESENT ILLNESS  (Location/Symptom, Timing/Onset, Context/Setting, Quality, Duration, Modifying Factors, Severity.)      Emi Daniels is a 74 y.o. female who presents with complaint of headache as well as left hip pain. Past medical history significant for CHF, CVA, current pancreatic carcinoma as well as hep C and COPD. Patient states that she is had significant migraine headache for the past few days and it feels like her typical migraine. Denies any neck stiffness or fevers. Was not sudden onset denies it waking her from sleep. States that she has been out of her medications as her sister is destroyed them. Patient also states she has been much more short of breath lately on exertion but denies any chest pain. Does admit to some left hip pain states that this is chronic but is slightly worse and she is out of her medications. Denies any trauma to the region, numbness, tingling, saddle anesthesia, urinary incontinence or difficulty urinating.     PAST MEDICAL / SURGICAL / SOCIAL / FAMILY HISTORY      has a past medical history of Appendicitis, Cerebral artery occlusion with cerebral infarction ([address] CHF (congestive heart failure) (HCC), Chronic respiratory failure with hypoxia and hypercapnia (HCC), Chronic rhinitis, Cigarette smoker, COPD (chronic obstructive pulmonary disease) ([address] Depression, Dysphagia, Essential hypertension, GERD (gastroesophageal reflux disease), Heart failure, diastolic, with acute decompensation ([address] Hepatitis C, chronic ([address] History of smoking at least 1 pack per day for at least 30 years, Hyperlipidemia, Hypertension, Migraine, Moderate COPD (chronic obstructive pulmonary disease) ([address] Multiple transfusions, Neurogenic dysphagia, Osteoarthritis, Pneumonia, Scoliosis, and Substance abuse ([address] has a past surgical history that includes  section; LEEP (); Upper gastrointestinal endoscopy (); Gastric bypass surgery; Appendectomy (2017); laparoscopic appendectomy (N/A, 3/27/2017); Upper gastrointestinal endoscopy (2018); Upper gastrointestinal endoscopy (N/A, 2018); pr colonoscopy w/biopsy single/multiple (N/A, 11/10/2018); pr egd transoral biopsy single/multiple (N/A, 2018); hip surgery (Right, 2019); Total hip arthroplasty (Right, 2019); Medication Injection (Left, 3/25/2021); Upper gastrointestinal endoscopy (N/A, 2021); other surgical history (Left, 2021); Medication Injection (Left, 2021); and Medication Injection (Left, 2021).       Social History     Socioeconomic History    Marital status: Single     Spouse name: Not on file    Number of children: Not on file    Years of education: Not on file    Highest education level: Not on file   Occupational History    Not on file   Tobacco Use    Smoking status: Current Some Day Smoker     Packs/day: 0.50     Years: 40.00     Pack years: 20.00     Types: Cigarettes     Start date: 5     Last attempt to quit: 11/2018     Years since quitting: 3.3    Smokeless tobacco: Never Used   Vaping Use    Vaping Use: Never used   Substance and Sexual Activity    Alcohol use: No     Alcohol/week: 0.0 standard drinks    Drug use: No     Types: IV, Cocaine     Comment: past history    Sexual activity: Not on file   Other Topics Concern    Not on file   Social History Narrative    Not on file     Social Determinants of Health     Financial Resource Strain:     Difficulty of Paying Living Expenses: Not on file   Food Insecurity:     Worried About Running Out of Food in the Last Year: Not on file    Toby of Food in the Last Year: Not on file   Transportation Needs:     Lack of Transportation (Medical): Not on file    Lack of Transportation (Non-Medical): Not on file   Physical Activity:     Days of Exercise per Week: Not on file    Minutes of Exercise per Session: Not on file   Stress:     Feeling of Stress : Not on file   Social Connections:     Frequency of Communication with Friends and Family: Not on file    Frequency of Social Gatherings with Friends and Family: Not on file    Attends Orthodox Services: Not on file    Active Member of [address] [place] [address] or Organizations: Not on file    Attends Club or Organization Meetings: Not on file    Marital Status: Not on file   Intimate Partner Violence:     Fear of Current or Ex-Partner: Not on file    Emotionally Abused: Not on file    Physically Abused: Not on file    Sexually Abused: Not on file   Housing Stability:     Unable to Pay for Housing in the Last Year: Not on file    Number of [place] in the Last Year: Not on file    Unstable Housing in the Last Year: Not on file       Family History   Problem Relation Age of Onset    Breast Cancer Mother     Heart Disease Father        Allergies:  Aspirin and Emily Yandle Gibbons [cephalexin]    Home Medications:  Prior to Admission medications    Medication Sig Start Date End Date Taking?  Authorizing Provider   gabapentin (NEURONTIN) 300 MG capsule Take 1 capsule by mouth 3 times daily for 30 days.  Intended supply: 90 days 9/27/21 10/27/21  Lucio Maya, APRN - CNP   ondansetron (ZOFRAN-ODT) 8 MG TBDP disintegrating tablet  9/23/21   Historical Provider, MD   mirtazapine (REMERON) 30 MG tablet  9/6/21   Historical Provider, MD   [address] 4-1 % [facility]  9/13/21   Historical Provider, MD   imatinib (GLEEVEC) 400 MG chemo tablet  9/14/21   Historical Provider, MD   gabapentin (NEURONTIN) 300 MG capsule  8/31/21   Historical Provider, MD   famotidine (PEPCID) 20 MG tablet  8/24/21   Historical Provider, MD   butalbital-acetaminophen-caffeine (FIORICET, ESGIC) -40 MG per tablet Take 1 tablet by mouth every 12 hours for 14 days 5/13/21 9/27/21  E Bob Doshi MD   ferrous sulfate (FE TABS 325) 325 (65 Fe) MG EC tablet Take 1 tablet by mouth daily 5/14/21   ISIDORO Doshi MD   amitriptyline (ELAVIL) 10 MG tablet Take 1 tablet by mouth nightly for 14 days 5/13/21 9/27/21  ISIDORO Doshi MD   atorvastatin (LIPITOR) 40 MG tablet Take 1 tablet by mouth daily for 14 days 5/13/21 9/27/21  ISIDORO Doshi MD   acetaminophen (TYLENOL) 500 MG tablet Take 2 tablets by mouth every 6 hours as needed for Pain 5/7/21   Tara Leung, DO   mometasone-formoterol (DULERA) 100-5 MCG/ACT inhaler Inhale 2 puffs into the lungs 2 times daily 4/6/21   Charo Bright, DO   lisinopril (PRINIVIL;ZESTRIL) 10 MG tablet Take 1 tablet by mouth daily 4/6/21   Charo Bright, DO   clopidogrel (PLAVIX) 75 MG tablet Take 1 tablet by mouth daily 4/6/21   Charo Bright, DO   carvedilol (COREG) 6.25 MG tablet Take 1 tablet by mouth 2 times daily (with meals) 4/6/21   Charo Bright, DO   atorvastatin (LIPITOR) 40 MG tablet Take 1 tablet by mouth nightly 4/6/21   Vernard Coral, DO   apixaban (ELIQUIS) 5 MG TABS tablet Take 1 tablet by mouth 2 times daily 4/6/21   Vernard Coral, DO   amitriptyline (ELAVIL) 10 MG tablet Take 1 tablet by mouth nightly 4/6/21   Vernard Coral, DO   albuterol Frail-appearing 57-year-old female, speaking in full sentences   HENT:      Head: Normocephalic. Nose: No congestion or rhinorrhea. Eyes:      Extraocular Movements: Extraocular movements intact. Pupils: Pupils are equal, round, and reactive to light. Cardiovascular:      Rate and Rhythm: Normal rate and regular rhythm. Heart sounds: No murmur heard. No friction rub. No gallop. Pulmonary:      Effort: No respiratory distress. Breath sounds: No stridor. No wheezing, rhonchi or rales. Chest:      Chest wall: No tenderness. Abdominal:      General: There is no distension. Palpations: There is no mass. Tenderness: There is no abdominal tenderness. There is no guarding or rebound. Hernia: No hernia is present. Musculoskeletal:         General: No swelling, tenderness, deformity or signs of injury. Right lower leg: No edema. Left lower leg: No edema. Skin:     Capillary Refill: Capillary refill takes less than 2 seconds. Coloration: Skin is not jaundiced or pale. Findings: No bruising, erythema, lesion or rash. Neurological:      Mental Status: She is alert and oriented to person, place, and time.          DIFFERENTIAL  DIAGNOSIS     PLAN (LABS / IMAGING / EKG):  Orders Placed This Encounter   Procedures    COVID-19, Rapid    XR CHEST PORTABLE    CBC with Auto Differential    Basic Metabolic Panel w/ Reflex to MG    Troponin    Brain Natriuretic Peptide    Lipase    Hepatic Function Panel    Telemetry monitoring - continuous duration    EKG 12 Lead    Place in Observation Service       MEDICATIONS ORDERED:  Orders Placed This Encounter   Medications    prochlorperazine (COMPAZINE) injection 10 mg    diphenhydrAMINE (BENADRYL) injection 25 mg    dexamethasone (DECADRON) injection 10 mg       DDX: Migraine, social concerns, ACS, pneumonia, CVA    DIAGNOSTIC RESULTS / EMERGENCY DEPARTMENT COURSE / MDM   LAB RESULTS:  Results for orders placed or performed during the hospital encounter of 03/04/22   CBC with Auto Differential   Result Value Ref Range    WBC 4.8 3.5 - 11.3 k/uL    RBC 5.28 (H) 3.95 - 5.11 m/uL    Hemoglobin 17.2 (H) 11.9 - 15.1 g/dL    Hematocrit 53.3 (H) 36.3 - 47.1 %    .9 82.6 - 102.9 fL    MCH 32.6 25.2 - 33.5 pg    MCHC 32.3 28.4 - 34.8 g/dL    RDW 13.2 11.8 - 14.4 %    Platelets 672 202 - 562 k/uL    MPV 9.5 8.1 - 13.5 fL    NRBC Automated 0.0 0.0 per 100 WBC    Seg Neutrophils 51 36 - 65 %    Lymphocytes 38 24 - 43 %    Monocytes 8 3 - 12 %    Eosinophils % 2 1 - 4 %    Basophils 1 0 - 2 %    Immature Granulocytes 0 0 %    Segs Absolute 2.48 1.50 - 8.10 k/uL    Absolute Lymph # 1.82 1.10 - 3.70 k/uL    Absolute Mono # 0.37 0.10 - 1.20 k/uL    Absolute Eos # 0.11 0.00 - 0.44 k/uL    Basophils Absolute 0.03 0.00 - 0.20 k/uL    Absolute Immature Granulocyte <0.03 0.00 - 0.30 k/uL   Basic Metabolic Panel w/ Reflex to MG   Result Value Ref Range    Glucose 83 70 - 99 mg/dL    BUN 17 8 - 23 mg/dL    CREATININE 0.80 0.50 - 0.90 mg/dL    Calcium 10.3 8.6 - 10.4 mg/dL    Sodium 142 135 - 144 mmol/L    Potassium 3.9 3.7 - 5.3 mmol/L    Chloride 103 98 - 107 mmol/L    CO2 21 20 - 31 mmol/L    Anion Gap 18 (H) 9 - 17 mmol/L    GFR Non-African American >60 >60 mL/min    GFR African American >60 >60 mL/min    GFR Comment         Troponin   Result Value Ref Range    Troponin, High Sensitivity 12 0 - 14 ng/L   Brain Natriuretic Peptide   Result Value Ref Range    Pro- (H) <300 pg/mL   EKG 12 Lead   Result Value Ref Range    Ventricular Rate 90 BPM    Atrial Rate 90 BPM    P-R Interval 152 ms    QRS Duration 68 ms    Q-T Interval 392 ms    QTc Calculation (Bazett) 479 ms    P Axis 115 degrees    R Axis 135 degrees    T Axis 124 degrees       IMPRESSION: Labs consistent with patient's baseline.     RADIOLOGY:  XR CHEST PORTABLE    Result Date: 3/4/2022  EXAMINATION: ONE XRAY VIEW OF THE CHEST 3/4/2022 5:47 pm COMPARISON: 5 June 2021 HISTORY: ORDERING SYSTEM PROVIDED HISTORY: SOB TECHNOLOGIST PROVIDED HISTORY: SOB FINDINGS: AP view of the chest time stamped at 1743 hours submitted. Heart size is normal.  No vascular congestion, focal consolidation, effusion, or pneumothorax is noted. Osseous and mediastinal structures are age-appropriate. Lung fields are mildly hyperinflated. No acute cardiopulmonary process. Mild hyperinflation of the lung fields. EKG  Normal sinus rhythm with left posterior fascicular block abnormal compared to prior EKG. All EKG's are interpreted by the Emergency Department Physician who either signs or Co-signs this chart in the absence of a cardiologist.    EMERGENCY DEPARTMENT COURSE:  Patient is a 58-year-old female that presents with migraine-like headache as well as chronic left hip pain. When discussing with patient she states that her sister is throughout all of her medications do not feel safe at home. Discussed with social work who patient is very familiar with. Discussed housing for patient versus placement. Patient has very significant history of pancreatic cancer as well as multiple medical issues. Discussed with patient placement in observation unit for new refills of some of her medications as well as set up with primary care physician. Patient was amenable to observation overnight with discharge tomorrow. Concerns that patient was on her medications she will return emergency department with the next few days. Therefore is felt that is best that she be placed in observation unit for PT/OT, continued social work evaluation, with hopeful housing placement. PROCEDURES:  n/a    CONSULTS:  None    CRITICAL CARE:  n/a    FINAL IMPRESSION      1. Other migraine without status migrainosus, not intractable    2. Social problem          DISPOSITION / [id number] Admitted 03/04/2022 07:14:51 PM      PATIENT REFERRED TO:  No follow-up provider specified.     DISCHARGE MEDICATIONS:  New Prescriptions    No medications on file       Dona Govea DO  Emergency Medicine Resident    (Please note that portions of thisnote were completed with a voice recognition program.  Efforts were made to edit the dictations but occasionally words are mis-transcribed.)        Flako Alvarez DO  Resident  03/04/22 3197

## 2022-03-05 LAB
EKG ATRIAL RATE: 80 BPM
EKG P AXIS: 83 DEGREES
EKG P-R INTERVAL: 168 MS
EKG Q-T INTERVAL: 402 MS
EKG QRS DURATION: 68 MS
EKG QTC CALCULATION (BAZETT): 463 MS
EKG R AXIS: 64 DEGREES
EKG T AXIS: 59 DEGREES
EKG VENTRICULAR RATE: 80 BPM

## 2022-03-05 PROCEDURE — 6370000000 HC RX 637 (ALT 250 FOR IP): Performed by: STUDENT IN AN ORGANIZED HEALTH CARE EDUCATION/TRAINING PROGRAM

## 2022-03-05 PROCEDURE — 97116 GAIT TRAINING THERAPY: CPT

## 2022-03-05 PROCEDURE — 93005 ELECTROCARDIOGRAM TRACING: CPT | Performed by: STUDENT IN AN ORGANIZED HEALTH CARE EDUCATION/TRAINING PROGRAM

## 2022-03-05 PROCEDURE — 94761 N-INVAS EAR/PLS OXIMETRY MLT: CPT

## 2022-03-05 PROCEDURE — 6370000000 HC RX 637 (ALT 250 FOR IP): Performed by: HEALTH CARE PROVIDER

## 2022-03-05 PROCEDURE — G0378 HOSPITAL OBSERVATION PER HR: HCPCS

## 2022-03-05 PROCEDURE — 76937 US GUIDE VASCULAR ACCESS: CPT

## 2022-03-05 PROCEDURE — 6370000000 HC RX 637 (ALT 250 FOR IP): Performed by: EMERGENCY MEDICINE

## 2022-03-05 PROCEDURE — 2700000000 HC OXYGEN THERAPY PER DAY

## 2022-03-05 PROCEDURE — 93010 ELECTROCARDIOGRAM REPORT: CPT | Performed by: INTERNAL MEDICINE

## 2022-03-05 PROCEDURE — 2580000003 HC RX 258: Performed by: STUDENT IN AN ORGANIZED HEALTH CARE EDUCATION/TRAINING PROGRAM

## 2022-03-05 PROCEDURE — 1200000000 HC SEMI PRIVATE

## 2022-03-05 PROCEDURE — 94640 AIRWAY INHALATION TREATMENT: CPT

## 2022-03-05 PROCEDURE — 6360000002 HC RX W HCPCS: Performed by: STUDENT IN AN ORGANIZED HEALTH CARE EDUCATION/TRAINING PROGRAM

## 2022-03-05 PROCEDURE — 96372 THER/PROPH/DIAG INJ SC/IM: CPT

## 2022-03-05 PROCEDURE — 94664 DEMO&/EVAL PT USE INHALER: CPT

## 2022-03-05 PROCEDURE — 97161 PT EVAL LOW COMPLEX 20 MIN: CPT

## 2022-03-05 RX ORDER — IPRATROPIUM BROMIDE AND ALBUTEROL SULFATE 2.5; .5 MG/3ML; MG/3ML
1 SOLUTION RESPIRATORY (INHALATION)
Status: DISCONTINUED | OUTPATIENT
Start: 2022-03-05 | End: 2022-03-08 | Stop reason: HOSPADM

## 2022-03-05 RX ORDER — BUTALBITAL, ACETAMINOPHEN AND CAFFEINE 50; 325; 40 MG/1; MG/1; MG/1
1 TABLET ORAL EVERY 4 HOURS PRN
Status: DISCONTINUED | OUTPATIENT
Start: 2022-03-05 | End: 2022-03-08 | Stop reason: HOSPADM

## 2022-03-05 RX ORDER — ONDANSETRON 4 MG/1
4 TABLET, ORALLY DISINTEGRATING ORAL EVERY 8 HOURS PRN
Status: DISCONTINUED | OUTPATIENT
Start: 2022-03-05 | End: 2022-03-08 | Stop reason: HOSPADM

## 2022-03-05 RX ORDER — LISINOPRIL 10 MG/1
10 TABLET ORAL DAILY
Status: DISCONTINUED | OUTPATIENT
Start: 2022-03-06 | End: 2022-03-08 | Stop reason: HOSPADM

## 2022-03-05 RX ADMIN — BUTALBITAL, ACETAMINOPHEN AND CAFFEINE 1 TABLET: 50; 325; 40 TABLET ORAL at 10:37

## 2022-03-05 RX ADMIN — IPRATROPIUM BROMIDE AND ALBUTEROL SULFATE 1 AMPULE: .5; 2.5 SOLUTION RESPIRATORY (INHALATION) at 20:14

## 2022-03-05 RX ADMIN — DESMOPRESSIN ACETATE 40 MG: 0.2 TABLET ORAL at 20:35

## 2022-03-05 RX ADMIN — METHOCARBAMOL 750 MG: 750 TABLET, FILM COATED ORAL at 00:52

## 2022-03-05 RX ADMIN — BUDESONIDE AND FORMOTEROL FUMARATE DIHYDRATE 2 PUFF: 80; 4.5 AEROSOL RESPIRATORY (INHALATION) at 08:30

## 2022-03-05 RX ADMIN — APIXABAN 5 MG: 5 TABLET, FILM COATED ORAL at 21:08

## 2022-03-05 RX ADMIN — BUDESONIDE AND FORMOTEROL FUMARATE DIHYDRATE 2 PUFF: 80; 4.5 AEROSOL RESPIRATORY (INHALATION) at 20:14

## 2022-03-05 RX ADMIN — ONDANSETRON 4 MG: 4 TABLET, ORALLY DISINTEGRATING ORAL at 21:09

## 2022-03-05 RX ADMIN — BUTALBITAL, ACETAMINOPHEN AND CAFFEINE 1 TABLET: 50; 325; 40 TABLET ORAL at 16:19

## 2022-03-05 RX ADMIN — ENOXAPARIN SODIUM 40 MG: 100 INJECTION SUBCUTANEOUS at 09:33

## 2022-03-05 RX ADMIN — BUTALBITAL, ACETAMINOPHEN AND CAFFEINE 1 TABLET: 50; 325; 40 TABLET ORAL at 20:35

## 2022-03-05 RX ADMIN — ACETAMINOPHEN 1000 MG: 500 TABLET ORAL at 06:33

## 2022-03-05 RX ADMIN — CARVEDILOL 6.25 MG: 6.25 TABLET, FILM COATED ORAL at 18:14

## 2022-03-05 RX ADMIN — CARVEDILOL 6.25 MG: 6.25 TABLET, FILM COATED ORAL at 09:33

## 2022-03-05 RX ADMIN — IPRATROPIUM BROMIDE AND ALBUTEROL SULFATE 1 AMPULE: .5; 2.5 SOLUTION RESPIRATORY (INHALATION) at 15:15

## 2022-03-05 RX ADMIN — AMITRIPTYLINE HYDROCHLORIDE 10 MG: 10 TABLET, FILM COATED ORAL at 20:35

## 2022-03-05 RX ADMIN — CLOPIDOGREL 75 MG: 75 TABLET, FILM COATED ORAL at 09:33

## 2022-03-05 RX ADMIN — OXYCODONE 2.5 MG: 5 TABLET ORAL at 21:09

## 2022-03-05 RX ADMIN — SODIUM CHLORIDE, PRESERVATIVE FREE 10 ML: 5 INJECTION INTRAVENOUS at 08:50

## 2022-03-05 ASSESSMENT — ENCOUNTER SYMPTOMS
TROUBLE SWALLOWING: 0
CHEST TIGHTNESS: 0
ABDOMINAL PAIN: 0
BACK PAIN: 0
SORE THROAT: 0
COUGH: 0
SHORTNESS OF BREATH: 0
VOMITING: 0
DIARRHEA: 0
CONSTIPATION: 0
ABDOMINAL DISTENTION: 0

## 2022-03-05 ASSESSMENT — PAIN SCALES - GENERAL
PAINLEVEL_OUTOF10: 3
PAINLEVEL_OUTOF10: 6
PAINLEVEL_OUTOF10: 8
PAINLEVEL_OUTOF10: 5
PAINLEVEL_OUTOF10: 8
PAINLEVEL_OUTOF10: 8

## 2022-03-05 ASSESSMENT — PAIN DESCRIPTION - LOCATION: LOCATION: HIP;KNEE

## 2022-03-05 NOTE — PROGRESS NOTES
Physical Therapy    Facility/Department: [address] MED SURG  Initial Assessment    NAME: Emi Daniels  : 1955  MRN: [id number]  Chief Complaint   Patient presents with    Migraine    Hip Pain     Date of Service: 3/5/2022    Discharge Recommendations:  Patient would benefit from continued therapy after discharge   PT Equipment Recommendations  Equipment Needed: Yes  Mobility Devices: Taffy Maid: Rolling  Other: Her listed height is 4' 11''    Assessment   Body structures, Functions, Activity limitations: Decreased functional mobility ; Decreased strength;Decreased balance;Decreased endurance  Assessment: Patient is weak and frail. Poor gait of 20'. Complaining of pain left hip, right knee. Patient needs further PT to regain functional independence. Prognosis: Good  Decision Making: Low Complexity  Clinical Presentation: stable  PT Education: Goals; General Safety;Gait Training;PT Role;Plan of Care;Transfer Training;Precautions; Functional Mobility Training  REQUIRES PT FOLLOW UP: Yes  Activity Tolerance  Activity Tolerance: Patient limited by fatigue       Patient Diagnosis(es): The primary encounter diagnosis was Other migraine without status migrainosus, not intractable. A diagnosis of Social problem was also pertinent to this visit.      has a past medical history of Appendicitis, Cerebral artery occlusion with cerebral infarction ([address] CHF (congestive heart failure) ([address] Chronic respiratory failure with hypoxia and hypercapnia (HCC), Chronic rhinitis, Cigarette smoker, COPD (chronic obstructive pulmonary disease) ([address] Depression, Dysphagia, Essential hypertension, GERD (gastroesophageal reflux disease), Heart failure, diastolic, with acute decompensation ([address] Hepatitis C, chronic ([address] History of smoking at least 1 pack per day for at least 30 years, Hyperlipidemia, Hypertension, Migraine, Moderate COPD (chronic obstructive pulmonary disease) ([address] Multiple transfusions, Neurogenic dysphagia, Osteoarthritis, Pneumonia, Scoliosis, and Substance abuse ([facility] has a past surgical history that includes  section; LEEP (); Upper gastrointestinal endoscopy (); Gastric bypass surgery; Appendectomy (2017); laparoscopic appendectomy (N/A, 3/27/2017); Upper gastrointestinal endoscopy (2018); Upper gastrointestinal endoscopy (N/A, 2018); pr colonoscopy w/biopsy single/multiple (N/A, 11/10/2018); pr egd transoral biopsy single/multiple (N/A, 2018); hip surgery (Right, 2019); Total hip arthroplasty (Right, 2019); Medication Injection (Left, 3/25/2021); Upper gastrointestinal endoscopy (N/A, 2021); other surgical history (Left, 2021); Medication Injection (Left, 2021); and Medication Injection (Left, 2021). Restrictions  Restrictions/Precautions  Restrictions/Precautions: Up as Tolerated  Vision/Hearing  Vision: Impaired (Needs her glasses which are not here. Able to see the floor, read the clock on the wall.)     Subjective  General  Chart Reviewed: Yes  Patient assessed for rehabilitation services?: Yes  Family / Caregiver Present: No  Follows Commands: Within Functional Limits  Pain Screening  Patient Currently in Pain: Yes  Pain Assessment  Pain Assessment: 0-10  Pain Level: 6  Pain Location: Hip;Knee  Vital Signs  Patient Currently in Pain: Yes  Oxygen Therapy  O2 Device: None (Room air)       Orientation  Orientation  Overall Orientation Status: Within Functional Limits  Social/Functional History  Social/Functional History  Additional Comments: Patient is currently homeless. Previously lived with her sister but cannot return there. Formerly, used a cane. Cognition   Cognition  Overall Cognitive Status: WFL    Objective     Observation/Palpation  Palpation: Both patellas sit further medially than laterally. With patellar mobility, some knee joint effusion can be felt under the knee caps.   Observation: She is short in stature at 3' 11'' and very thin, 85 lbs. Upon sitting up and standing up, she appears lightheaded. Strength RLE  Strength RLE: Exception  R Hip Flexion: 3+/5  R Hip Extension: 4-/5  R Knee Extension: 4-/5  R Ankle Dorsiflexion: 3+/5  R Ankle Plantar flexion: 4-/5  Strength LLE  Strength LLE: Exception  L Hip Flexion: 3+/5  L Hip Extension: 4-/5  L Knee Extension: 4-/5  L Ankle Dorsiflexion: 3+/5  L Ankle Plantar Flexion: 4-/5        Bed mobility  Supine to Sit: Minimal assistance  Sit to Supine: Minimal assistance  Transfers  Sit to Stand: Minimal Assistance  Stand to sit: Minimal Assistance  Ambulation  Ambulation?: Yes  Ambulation 1  Surface: level tile  Device: Rolling Walker  Assistance: Contact guard assistance  Quality of Gait: Appears lightheaded. Denies \"wooziness\", reports dizziness. Gait Deviations: Slow Geovanna; Shuffles  Distance: 25'  Comments: Rushes returning to the bed, appearing to be  an effort to prevent herself from falling. Balance  Standing - Static: Fair;-  Standing - Dynamic: Poor;+        Plan   Plan  Times per week: 5-6x/wk  Current Treatment Recommendations: Strengthening,Home Exercise Program,Safety Education & Training,Endurance Training,Functional Mobility Training,Transfer Training,Gait Training,Equipment Evaluation, Education, & procurement,Patient/Caregiver Education & Training  Safety Devices  Type of devices: All fall risk precautions in place,Call light within reach,Gait belt,Left in bed,Bed alarm in place           AM-PAC Score  AM-PAC Inpatient Mobility Raw Score : 16 (03/05/22 1422)  AM-PAC Inpatient T-Scale Score : 40.78 (03/05/22 1422)  Mobility Inpatient CMS 0-100% Score: 54.16 (03/05/22 1422)  Mobility Inpatient CMS G-Code Modifier : CK (03/05/22 1422)          Goals  Short term goals  Time Frame for Short term goals: 14 visits  Short term goal 1: Sit to/from stand with independence. Short term goal 2: Ambulate 100' with walker with SBA.   Short term goal 3: Improve standing balance to fair dynamic.        Therapy Time   Individual Concurrent Group Co-treatment   Time In 1340         Time Out 1405         Minutes 25         Timed Code Treatment Minutes: 13 Minutes       Gabriel Hurtado PT

## 2022-03-05 NOTE — CARE COORDINATION
Consult re: homeless  Met with pt this date was alert and oriented  Pt states she is homeless, does not have a permanent place of her own. Pt states she was living with her sister prior admission and can not go back there. Pt states she recently at [facility] 1 month ago and want to go back. Writer explained admission criteria and will require insurance authorization. Pt states can you at least try please. Informed pt will notify casemgr of her request.  Also discussed shelter if does not meet criteria for SNF. Pt states she will stay with different people, no shelter. 3C CM informed of above.

## 2022-03-05 NOTE — ED NOTES
The following labs labeled with pt sticker and tubed to lab:     [] Blue     [] Lavender   [] on ice  [] Green/yellow  [] Green/black [] on ice  [] Yellow  [] Red  [] Pink      [x] COVID-19 swab    [x] Rapid  [] PCR  [] Flu swab  [] Peds Viral Panel     [] Urine Sample  [] Pelvic Cultures  [] Blood Cultures            Angi Rivero RN  03/04/22 1921
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
## 2022-03-05 NOTE — CARE COORDINATION
Spoke with patient, who relates she can barely walk, she wants to go to [facility]. Referral faxed.   Need PT/OT notes    [id number] discussed referral with Bernadette at [facility]

## 2022-03-05 NOTE — CARE COORDINATION
Case Management Initial Discharge Plan  Emi Daniels,             Met with:patient to discuss discharge plans. Information verified: address, contacts, phone number, , insurance Yes  Insurance Provider: Donald Bonilla    Emergency Contact/Next of Kin name & number:   Elijah Lassiter    [phone]           Who are involved in patient's support system? sister    PCP: Hillary Reyna MD  Date of last visit: 1 day      Discharge Planning    Living Arrangements:        Home has 0 stories  0 stairs to climb to get into front door, 0stairs to climb to reach second floor  Location of bedroom/bathroom in home main    Patient able to perform ADL's:Independent    Current Services (outpatient & in home) none  DME equipment: none needs cane  DME provider:     Is patient receiving oral anticoagulation therapy? No    If indicated:   Physician managing anticoagulation treatment: n/a  Where does patient obtain lab work for ATC treatment? n/a      Potential Assistance Needed:       Patient agreeable to home care: No  [place] of choice provided:  n/a    Prior SNF/Rehab Placement and Facility: none  Agreeable to SNF/Rehab: No  [place] of choice provided: n/a     Evaluation: yes    Expected Discharge date:       Patient expects to be discharged to: If home: is the family and/or caregiver wiling & able to provide support at home? sister  Who will be providing this support? Follow Up Appointment: Best Day/ Time:      Transportation provider: alex  Transportation arrangements needed for discharge: Yes    Readmission Risk              Risk of Unplanned Readmission:  0             Does patient have a readmission risk score greater than 14?: n/a  If yes, follow-up appointment must be made within 7 days of discharge.      Goals of Care: pain control      Educated pt on transitional options, provided freedom of choice and are agreeable with plan      Discharge Plan:         Electronically signed by René Dotson RN on 3/5/22 at 9:22 AM EST
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
## 2022-03-06 LAB
ABSOLUTE EOS #: 0.07 K/UL (ref 0–0.44)
ABSOLUTE IMMATURE GRANULOCYTE: 0.05 K/UL (ref 0–0.3)
ABSOLUTE LYMPH #: 2.29 K/UL (ref 1.1–3.7)
ABSOLUTE MONO #: 0.39 K/UL (ref 0.1–1.2)
ANION GAP SERPL CALCULATED.3IONS-SCNC: 15 MMOL/L (ref 9–17)
BASOPHILS # BLD: 1 % (ref 0–2)
BASOPHILS ABSOLUTE: 0.04 K/UL (ref 0–0.2)
BUN BLDV-MCNC: 15 MG/DL (ref 8–23)
CALCIUM SERPL-MCNC: 9.1 MG/DL (ref 8.6–10.4)
CHLORIDE BLD-SCNC: 107 MMOL/L (ref 98–107)
CO2: 21 MMOL/L (ref 20–31)
CREAT SERPL-MCNC: 0.74 MG/DL (ref 0.5–0.9)
EOSINOPHILS RELATIVE PERCENT: 1 % (ref 1–4)
GFR AFRICAN AMERICAN: >60 ML/MIN
GFR NON-AFRICAN AMERICAN: >60 ML/MIN
GFR SERPL CREATININE-BSD FRML MDRD: ABNORMAL ML/MIN/{1.73_M2}
GLUCOSE BLD-MCNC: 133 MG/DL (ref 70–99)
HCT VFR BLD CALC: 49.3 % (ref 36.3–47.1)
HEMOGLOBIN: 16.1 G/DL (ref 11.9–15.1)
IMMATURE GRANULOCYTES: 1 %
LYMPHOCYTES # BLD: 40 % (ref 24–43)
MCH RBC QN AUTO: 32.9 PG (ref 25.2–33.5)
MCHC RBC AUTO-ENTMCNC: 32.7 G/DL (ref 28.4–34.8)
MCV RBC AUTO: 100.8 FL (ref 82.6–102.9)
MONOCYTES # BLD: 7 % (ref 3–12)
NRBC AUTOMATED: 0 PER 100 WBC
PDW BLD-RTO: 13.2 % (ref 11.8–14.4)
PLATELET # BLD: 293 K/UL (ref 138–453)
PMV BLD AUTO: 9.6 FL (ref 8.1–13.5)
POTASSIUM SERPL-SCNC: 4 MMOL/L (ref 3.7–5.3)
RBC # BLD: 4.89 M/UL (ref 3.95–5.11)
SEG NEUTROPHILS: 50 % (ref 36–65)
SEGMENTED NEUTROPHILS ABSOLUTE COUNT: 2.87 K/UL (ref 1.5–8.1)
SODIUM BLD-SCNC: 143 MMOL/L (ref 135–144)
WBC # BLD: 5.7 K/UL (ref 3.5–11.3)

## 2022-03-06 PROCEDURE — 6370000000 HC RX 637 (ALT 250 FOR IP): Performed by: HEALTH CARE PROVIDER

## 2022-03-06 PROCEDURE — 97166 OT EVAL MOD COMPLEX 45 MIN: CPT

## 2022-03-06 PROCEDURE — 6360000002 HC RX W HCPCS: Performed by: STUDENT IN AN ORGANIZED HEALTH CARE EDUCATION/TRAINING PROGRAM

## 2022-03-06 PROCEDURE — G0378 HOSPITAL OBSERVATION PER HR: HCPCS

## 2022-03-06 PROCEDURE — 6370000000 HC RX 637 (ALT 250 FOR IP): Performed by: STUDENT IN AN ORGANIZED HEALTH CARE EDUCATION/TRAINING PROGRAM

## 2022-03-06 PROCEDURE — 1200000000 HC SEMI PRIVATE

## 2022-03-06 PROCEDURE — 2700000000 HC OXYGEN THERAPY PER DAY

## 2022-03-06 PROCEDURE — 85025 COMPLETE CBC W/AUTO DIFF WBC: CPT

## 2022-03-06 PROCEDURE — 94640 AIRWAY INHALATION TREATMENT: CPT

## 2022-03-06 PROCEDURE — 94761 N-INVAS EAR/PLS OXIMETRY MLT: CPT

## 2022-03-06 PROCEDURE — 97535 SELF CARE MNGMENT TRAINING: CPT

## 2022-03-06 PROCEDURE — 6370000000 HC RX 637 (ALT 250 FOR IP): Performed by: EMERGENCY MEDICINE

## 2022-03-06 PROCEDURE — 80048 BASIC METABOLIC PNL TOTAL CA: CPT

## 2022-03-06 PROCEDURE — 36415 COLL VENOUS BLD VENIPUNCTURE: CPT

## 2022-03-06 PROCEDURE — 94664 DEMO&/EVAL PT USE INHALER: CPT

## 2022-03-06 RX ORDER — OXYCODONE HYDROCHLORIDE 5 MG/1
2.5 TABLET ORAL EVERY 4 HOURS PRN
Status: DISCONTINUED | OUTPATIENT
Start: 2022-03-06 | End: 2022-03-08 | Stop reason: HOSPADM

## 2022-03-06 RX ORDER — LORAZEPAM 0.5 MG/1
0.5 TABLET ORAL ONCE
Status: COMPLETED | OUTPATIENT
Start: 2022-03-06 | End: 2022-03-06

## 2022-03-06 RX ORDER — PENICILLIN V POTASSIUM 250 MG/1
500 TABLET ORAL ONCE
Status: COMPLETED | OUTPATIENT
Start: 2022-03-06 | End: 2022-03-06

## 2022-03-06 RX ORDER — HYDROCODONE BITARTRATE AND ACETAMINOPHEN 5; 325 MG/1; MG/1
2 TABLET ORAL EVERY 6 HOURS PRN
Status: DISCONTINUED | OUTPATIENT
Start: 2022-03-06 | End: 2022-03-08 | Stop reason: HOSPADM

## 2022-03-06 RX ORDER — LIDOCAINE 4 G/G
1 PATCH TOPICAL DAILY
Status: DISCONTINUED | OUTPATIENT
Start: 2022-03-06 | End: 2022-03-08 | Stop reason: HOSPADM

## 2022-03-06 RX ADMIN — IPRATROPIUM BROMIDE AND ALBUTEROL SULFATE 1 AMPULE: .5; 2.5 SOLUTION RESPIRATORY (INHALATION) at 21:39

## 2022-03-06 RX ADMIN — CARVEDILOL 6.25 MG: 6.25 TABLET, FILM COATED ORAL at 21:56

## 2022-03-06 RX ADMIN — LISINOPRIL 10 MG: 10 TABLET ORAL at 08:38

## 2022-03-06 RX ADMIN — BUTALBITAL, ACETAMINOPHEN AND CAFFEINE 1 TABLET: 50; 325; 40 TABLET ORAL at 12:42

## 2022-03-06 RX ADMIN — PENICILLIN V POTASSIUM 500 MG: 250 TABLET ORAL at 12:44

## 2022-03-06 RX ADMIN — METHYLPREDNISOLONE 40 MG: 16 TABLET ORAL at 12:44

## 2022-03-06 RX ADMIN — HYDROCODONE BITARTRATE AND ACETAMINOPHEN 2 TABLET: 5; 325 TABLET ORAL at 20:29

## 2022-03-06 RX ADMIN — BUTALBITAL, ACETAMINOPHEN AND CAFFEINE 1 TABLET: 50; 325; 40 TABLET ORAL at 08:38

## 2022-03-06 RX ADMIN — LORAZEPAM 0.5 MG: 0.5 TABLET ORAL at 12:42

## 2022-03-06 RX ADMIN — IPRATROPIUM BROMIDE AND ALBUTEROL SULFATE 1 AMPULE: .5; 2.5 SOLUTION RESPIRATORY (INHALATION) at 08:25

## 2022-03-06 RX ADMIN — APIXABAN 5 MG: 5 TABLET, FILM COATED ORAL at 20:30

## 2022-03-06 RX ADMIN — IPRATROPIUM BROMIDE AND ALBUTEROL SULFATE 1 AMPULE: .5; 2.5 SOLUTION RESPIRATORY (INHALATION) at 15:31

## 2022-03-06 RX ADMIN — BUDESONIDE AND FORMOTEROL FUMARATE DIHYDRATE 2 PUFF: 80; 4.5 AEROSOL RESPIRATORY (INHALATION) at 08:25

## 2022-03-06 RX ADMIN — CLOPIDOGREL 75 MG: 75 TABLET, FILM COATED ORAL at 08:38

## 2022-03-06 RX ADMIN — HYDROCODONE BITARTRATE AND ACETAMINOPHEN 2 TABLET: 5; 325 TABLET ORAL at 14:48

## 2022-03-06 RX ADMIN — IPRATROPIUM BROMIDE AND ALBUTEROL SULFATE 1 AMPULE: .5; 2.5 SOLUTION RESPIRATORY (INHALATION) at 11:29

## 2022-03-06 RX ADMIN — CARVEDILOL 6.25 MG: 6.25 TABLET, FILM COATED ORAL at 08:39

## 2022-03-06 RX ADMIN — BUTALBITAL, ACETAMINOPHEN AND CAFFEINE 1 TABLET: 50; 325; 40 TABLET ORAL at 21:56

## 2022-03-06 RX ADMIN — DESMOPRESSIN ACETATE 40 MG: 0.2 TABLET ORAL at 20:29

## 2022-03-06 RX ADMIN — BUDESONIDE AND FORMOTEROL FUMARATE DIHYDRATE 2 PUFF: 80; 4.5 AEROSOL RESPIRATORY (INHALATION) at 21:39

## 2022-03-06 RX ADMIN — AMITRIPTYLINE HYDROCHLORIDE 10 MG: 10 TABLET, FILM COATED ORAL at 20:29

## 2022-03-06 ASSESSMENT — PAIN DESCRIPTION - PROGRESSION
CLINICAL_PROGRESSION: NOT CHANGED

## 2022-03-06 ASSESSMENT — PAIN SCALES - GENERAL
PAINLEVEL_OUTOF10: 7
PAINLEVEL_OUTOF10: 8

## 2022-03-06 ASSESSMENT — PAIN DESCRIPTION - ORIENTATION
ORIENTATION: LEFT
ORIENTATION: LEFT

## 2022-03-06 ASSESSMENT — PAIN DESCRIPTION - FREQUENCY: FREQUENCY: CONTINUOUS

## 2022-03-06 ASSESSMENT — PAIN DESCRIPTION - DESCRIPTORS: DESCRIPTORS: ACHING;DISCOMFORT;CONSTANT

## 2022-03-06 ASSESSMENT — PAIN DESCRIPTION - PAIN TYPE: TYPE: CHRONIC PAIN

## 2022-03-06 ASSESSMENT — PAIN DESCRIPTION - ONSET: ONSET: ON-GOING

## 2022-03-06 ASSESSMENT — PAIN DESCRIPTION - LOCATION
LOCATION: HIP
LOCATION: HIP;KNEE

## 2022-03-06 NOTE — PROGRESS NOTES
OBS/CDU   RESIDENT NOTE      Patients PCP is:  Mila Stevenson MD        SUBJECTIVE      No acute events overnight. Denies any abdominal pain. States that her headache has improved but her hip is still bothering her. She is requesting anti biotics for her dental infection and states that she has not been following consistently for her pancreatic cancer due to homelessness. PHYSICAL EXAM      General: uncomfortable appearing, AO X 3  Heent: EMOI, PERRL  Neck: SUPPLE, NO JVD  Cardiovascular: RRR, S1S2  Pulmonary: NO SOB  Abdomen: SOFT, NTTP, ND, +BS  Extremities: +2/4 PULSES DISTAL, NO SWELLING  Neuro / Psych: NO NUMBNESS OR TINGLING, MENTATION AT BASELINE    PERTINENT TEST /EXAMS      I have reviewed all available laboratory results.     MEDICATIONS CURRENT   lidocaine 4 % external patch 1 patch, Daily  apixaban (ELIQUIS) tablet 5 mg, BID  butalbital-acetaminophen-caffeine (FIORICET, ESGIC) per tablet 1 tablet, Q4H PRN  ipratropium-albuterol (DUONEB) nebulizer solution 1 ampule, Q4H WA  lisinopril (PRINIVIL;ZESTRIL) tablet 10 mg, Daily  ondansetron (ZOFRAN-ODT) disintegrating tablet 4 mg, Q8H PRN  acetaminophen (TYLENOL) tablet 1,000 mg, Q6H PRN  amitriptyline (ELAVIL) tablet 10 mg, Nightly  atorvastatin (LIPITOR) tablet 40 mg, Nightly  clopidogrel (PLAVIX) tablet 75 mg, Daily  budesonide-formoterol (SYMBICORT) 80-4.5 MCG/ACT inhaler 2 puff, BID  sodium chloride flush 0.9 % injection 5-40 mL, 2 times per day  sodium chloride flush 0.9 % injection 5-40 mL, PRN  0.9 % sodium chloride infusion, PRN  potassium chloride (KLOR-CON M) extended release tablet 40 mEq, PRN   Or  potassium bicarb-citric acid (EFFER-K) effervescent tablet 40 mEq, PRN   Or  potassium chloride 10 mEq/100 mL IVPB (Peripheral Line), PRN  polyethylene glycol (GLYCOLAX) packet 17 g, Daily PRN  carvedilol (COREG) tablet 6.25 mg, BID WC  oxyCODONE (ROXICODONE) immediate release tablet 2.5 mg, Q6H PRN        All medication charted and

## 2022-03-06 NOTE — CARE COORDINATION
Spoke with Bernadette at [facility] to follow up on referral.  Alexia Walters states she needs to speak with the building tomorrow to confirm acceptance and patient will need precert. I let her know that PT saw patient yesterday and OT is seeing her this morning.

## 2022-03-06 NOTE — PROGRESS NOTES
Occupational Therapy   Occupational Therapy Initial Assessment  Date: 3/6/2022   Patient Name: Inocencio Lo  MRN: [id number]     : 1955    Date of Service: 3/6/2022  Chief Complaint   Patient presents with    Migraine    Hip Pain     Discharge Recommendations:  Patient would benefit from continued therapy after discharge     Assessment   Performance deficits / Impairments: Decreased functional mobility ; Decreased ADL status; Decreased endurance;Decreased high-level IADLs;Decreased safe awareness;Decreased balance;Decreased strength  Assessment: Patient demonstrates with B UE weakness upon assessment sitting EOB. Pt engaged in functional mobility to bathroom to engage in toileting tasks at [facility]. Pt demo poor RW placement during functional tasks, requiring Max A to correct, pt with increased agitation, limiting further treatment during session. Pt retired supine with all needs met. Prognosis: Good  Decision Making: Medium Complexity  Patient Education: OT role, OT POC, purpose of evaluation, hand placement for transfers, activity promotion, fall prevention, RW placement during ADL - poor return  REQUIRES OT FOLLOW UP: Yes  Activity Tolerance  Activity Tolerance: Treatment limited secondary to agitation  Safety Devices  Safety Devices in place: Yes  Type of devices: Gait belt;Call light within reach;Nurse notified; Left in bed;Bed alarm in place  Restraints  Initially in place: No         Patient Diagnosis(es): The primary encounter diagnosis was Other migraine without status migrainosus, not intractable. A diagnosis of Social problem was also pertinent to this visit.      has a past medical history of Appendicitis, Cerebral artery occlusion with cerebral infarction [facility]), CHF (congestive heart failure) ([facility] [address] Chronic respiratory failure with hypoxia and hypercapnia (HCC), Chronic rhinitis, Cigarette smoker, COPD (chronic obstructive pulmonary disease) ([facility] [address] Depression, Dysphagia, Essential hypertension, GERD (gastroesophageal reflux disease), Heart failure, diastolic, with acute decompensation ([facility] [address] Hepatitis C, chronic (HCC), History of smoking at least 1 pack per day for at least 30 years, Hyperlipidemia, Hypertension, Migraine, Moderate COPD (chronic obstructive pulmonary disease) ([facility] [address] Multiple transfusions, Neurogenic dysphagia, Osteoarthritis, Pneumonia, Scoliosis, and Substance abuse ([facility] [address] has a past surgical history that includes  section; LEEP (); Upper gastrointestinal endoscopy (); Gastric bypass surgery; Appendectomy (2017); laparoscopic appendectomy (N/A, 3/27/2017); Upper gastrointestinal endoscopy (2018); Upper gastrointestinal endoscopy (N/A, 2018); pr colonoscopy w/biopsy single/multiple (N/A, 11/10/2018); pr egd transoral biopsy single/multiple (N/A, 2018); hip surgery (Right, 2019); Total hip arthroplasty (Right, 2019); Medication Injection (Left, 3/25/2021); Upper gastrointestinal endoscopy (N/A, 2021); other surgical history (Left, 2021); Medication Injection (Left, 2021); and Medication Injection (Left, 2021). Restrictions  Restrictions/Precautions  Restrictions/Precautions: Up as Tolerated    Subjective   General  Patient assessed for rehabilitation services?: Yes  Family / Caregiver Present: No  General Comment  Comments: RN ok'd patient for OT evaluation. Pt agreeable, participatory throughout. Limited d/t patient agitation with education. Patient Currently in Pain: Yes  Pain Assessment  Pain Level: 8  Pain Location: Hip  Pain Orientation: Left  Non-Pharmaceutical Pain Intervention(s): Ambulation/Increased Activity;Repositioned; Therapeutic presence; Rest  Response to Pain Intervention: Patient Satisfied  Vital Signs  Patient Currently in Pain: Yes    Social/Functional History  Social/Functional History  Type of Home: Homeless  ADL Assistance: Independent  Homemaking Assistance: Independent  Homemaking Responsibilities: Yes  Meal Prep Responsibility: Primary  Laundry Responsibility: Primary  Cleaning Responsibility: Primary  Shopping Responsibility: Primary  Ambulation Assistance: Independent  Transfer Assistance: Independent  Active :  (reports can drive but doesn't have a car)  Occupation: On disability  Additional Comments: Patient is currently homeless. Previously lived with her sister but cannot return there. Formerly, used a cane. Objective   Vision: Impaired  Vision Exceptions: Wears glasses for reading  Hearing: Exceptions to [facility]  Hearing Exceptions: Hard of hearing/hearing concerns    Orientation  Overall Orientation Status: Within Functional Limits     Balance  Sitting Balance: Supervision (EOB for ~2-3 min c/o increased back pain)  Standing Balance: Contact guard assistance  Standing Balance  Time: ~2-3 min  Activity: toileting tasks, sinkside grooming  Functional Mobility  Functional - Mobility Device: Rolling Walker  Activity: To/from bathroom  Assist Level: Contact guard assistance  Functional Mobility Comments: poor return on placement of RW during functional tasks, increased agitation and poor reception of education  Toilet Transfers  Toilet - Technique: Ambulating  Equipment Used: Standard toilet  Toilet Transfer: Contact guard assistance  ADL  Feeding: Independent  Grooming: Stand by assistance  UE Bathing: Stand by assistance  LE Bathing: Contact guard assistance  UE Dressing: Stand by assistance  LE Dressing: Contact guard assistance  Toileting: Contact guard assistance  Additional Comments: Patient donned socks sitting EOB at [facility], completed functional mobility to bathroom to engage in toiletingt tasks at [facility] for balance during clothing mgmt and B hand release from RW for patient safety. Pt stood sinkside and engaged in hand hygiene at [place] [id number] for balance, Mod I for task. Pt retired to bed d/t increased agitation with education on safety and appropriate positioning of RW.   Tone JANIE LIMA Tone: Normotonic  Tone LUE  LUE Tone: Normotonic  Coordination  Movements Are Fluid And Coordinated: No  Coordination and Movement description: Decreased speed;Decreased accuracy; Right UE;Left UE;Fine motor impairments     Bed mobility  Supine to Sit: Stand by assistance  Sit to Supine: Stand by assistance  Transfers  Sit to stand: Contact guard assistance  Stand to sit: Stand by assistance     Cognition  Overall Cognitive Status: Exceptions  Safety Judgement: Decreased awareness of need for assistance;Decreased awareness of need for safety        Sensation  Overall Sensation Status: Impaired (reports R fingers get numb)      LUE AROM : WFL  Left Hand AROM: WFL  RUE AROM : WFL  Right Hand AROM: WFL  LUE Strength  Gross LUE Strength: Exceptions to [facility]  L Shoulder Flex: 3+/5  L Elbow Flex: 3+/5  L Elbow Ext: 3+/5  L Hand General: 3+/5  LUE Strength Comment: grossly 3+/5, questionable effort provided  RUE Strength  Gross RUE Strength: Exceptions to [facility]  R Shoulder Flex: 3+/5  R Elbow Flex: 3+/5  R Elbow Ext: 3+/5  R Hand General: 3+/5  RUE Strength Comment: grossly 3+/5, questionable effort provided    Plan   Plan  Times per week: 3-4x/wk  Current Treatment Recommendations: Strengthening,Endurance Training,Patient/Caregiver Education & Training,Equipment Evaluation, Education, & procurement,Self-Care / ADL,Home Management Training,Safety Education & Training,Functional Mobility Training,Balance Training,Positioning      AM-PAC Score        AM-[facility] Inpatient Daily Activity Raw Score: 19 (03/06/22 1149)  AM-PAC Inpatient ADL T-Scale Score : 40.22 (03/06/22 1149)  ADL Inpatient CMS 0-100% Score: 42.8 (03/06/22 1149)  ADL Inpatient CMS G-Code Modifier : CK (03/06/22 1149)    Goals  Short term goals  Time Frame for Short term goals: Patient will, by discharge  Short term goal 1: demo ADLs independently  Short term goal 2: demo functional transfers/mobility using LRD at Supervision to engage in ADLs  Short term goal 3: demo proper placement of RW during funcitonal tasks independently  Short term goal 4: demo 10+ min of dynamic standing tolerance at Supervision to engage in ADLs safely  Short term goal 5: demo good safety awareness during functional tasks with 0 VCs     Therapy Time   Individual Concurrent Group Co-treatment   Time In 0923         Time Out 0937         Minutes 14         Timed Code Treatment Minutes: [address] [facility], OTR/L
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
## 2022-03-06 NOTE — PROGRESS NOTES
03/06/22 0824   Respiratory   Respiratory Pattern Regular   Respiratory Depth Normal   Respiratory Quality/Effort Unlabored; Other (Comment)  (patient noted feeling SOB )   Chest Assessment Chest expansion symmetrical   L Breath Sounds Clear;Diminished   R Breath Sounds Clear;Diminished     Patient on Room Air,   RR 20  Breath Sounds: Clear/Diminished    Respiratory Assessment/Evaluation     [x]         IMPROVE AERATION/BREATH SOUNDS  [x]   ADMINISTER BRONCHODILATOR THERAPY AS APPROPRIATE  [x]   ASSESS BREATH SOUNDS  [x]   PATIENT EDUCATION AS NEEDED    Carlyn Lujan, YULIP     Patient Assessment complete. Migraine aura without headache [G43.109]  Social problem [Z65.9]  Other migraine without status migrainosus, not intractable [G43.809] .

## 2022-03-07 ENCOUNTER — TELEPHONE (OUTPATIENT)
Dept: FAMILY MEDICINE CLINIC | Age: 67
End: 2022-03-07

## 2022-03-07 PROCEDURE — 1200000000 HC SEMI PRIVATE

## 2022-03-07 PROCEDURE — 97116 GAIT TRAINING THERAPY: CPT

## 2022-03-07 PROCEDURE — 6360000002 HC RX W HCPCS: Performed by: STUDENT IN AN ORGANIZED HEALTH CARE EDUCATION/TRAINING PROGRAM

## 2022-03-07 PROCEDURE — 6370000000 HC RX 637 (ALT 250 FOR IP): Performed by: STUDENT IN AN ORGANIZED HEALTH CARE EDUCATION/TRAINING PROGRAM

## 2022-03-07 PROCEDURE — 6370000000 HC RX 637 (ALT 250 FOR IP): Performed by: EMERGENCY MEDICINE

## 2022-03-07 PROCEDURE — 6370000000 HC RX 637 (ALT 250 FOR IP): Performed by: HEALTH CARE PROVIDER

## 2022-03-07 PROCEDURE — 99255 IP/OBS CONSLTJ NEW/EST HI 80: CPT | Performed by: INTERNAL MEDICINE

## 2022-03-07 PROCEDURE — 94640 AIRWAY INHALATION TREATMENT: CPT

## 2022-03-07 PROCEDURE — G0378 HOSPITAL OBSERVATION PER HR: HCPCS

## 2022-03-07 PROCEDURE — 97110 THERAPEUTIC EXERCISES: CPT

## 2022-03-07 RX ORDER — NICOTINE 21 MG/24HR
1 PATCH, TRANSDERMAL 24 HOURS TRANSDERMAL DAILY
Status: DISCONTINUED | OUTPATIENT
Start: 2022-03-07 | End: 2022-03-08 | Stop reason: HOSPADM

## 2022-03-07 RX ORDER — PENICILLIN V POTASSIUM 250 MG/1
500 TABLET ORAL EVERY 6 HOURS SCHEDULED
Status: DISCONTINUED | OUTPATIENT
Start: 2022-03-07 | End: 2022-03-08 | Stop reason: HOSPADM

## 2022-03-07 RX ADMIN — IPRATROPIUM BROMIDE AND ALBUTEROL SULFATE 1 AMPULE: .5; 2.5 SOLUTION RESPIRATORY (INHALATION) at 21:07

## 2022-03-07 RX ADMIN — IPRATROPIUM BROMIDE AND ALBUTEROL SULFATE 1 AMPULE: .5; 2.5 SOLUTION RESPIRATORY (INHALATION) at 12:18

## 2022-03-07 RX ADMIN — HYDROCODONE BITARTRATE AND ACETAMINOPHEN 2 TABLET: 5; 325 TABLET ORAL at 02:08

## 2022-03-07 RX ADMIN — IPRATROPIUM BROMIDE AND ALBUTEROL SULFATE 1 AMPULE: .5; 2.5 SOLUTION RESPIRATORY (INHALATION) at 07:55

## 2022-03-07 RX ADMIN — CARVEDILOL 6.25 MG: 6.25 TABLET, FILM COATED ORAL at 19:14

## 2022-03-07 RX ADMIN — BUDESONIDE AND FORMOTEROL FUMARATE DIHYDRATE 2 PUFF: 80; 4.5 AEROSOL RESPIRATORY (INHALATION) at 21:08

## 2022-03-07 RX ADMIN — IPRATROPIUM BROMIDE AND ALBUTEROL SULFATE 1 AMPULE: .5; 2.5 SOLUTION RESPIRATORY (INHALATION) at 16:24

## 2022-03-07 RX ADMIN — BUTALBITAL, ACETAMINOPHEN AND CAFFEINE 1 TABLET: 50; 325; 40 TABLET ORAL at 09:00

## 2022-03-07 RX ADMIN — APIXABAN 5 MG: 5 TABLET, FILM COATED ORAL at 08:53

## 2022-03-07 RX ADMIN — CLOPIDOGREL 75 MG: 75 TABLET, FILM COATED ORAL at 08:53

## 2022-03-07 RX ADMIN — APIXABAN 5 MG: 5 TABLET, FILM COATED ORAL at 20:40

## 2022-03-07 RX ADMIN — LISINOPRIL 10 MG: 10 TABLET ORAL at 08:53

## 2022-03-07 RX ADMIN — PENICILLIN V POTASSIUM 500 MG: 250 TABLET ORAL at 14:58

## 2022-03-07 RX ADMIN — HYDROCODONE BITARTRATE AND ACETAMINOPHEN 2 TABLET: 5; 325 TABLET ORAL at 14:59

## 2022-03-07 RX ADMIN — HYDROCODONE BITARTRATE AND ACETAMINOPHEN 2 TABLET: 5; 325 TABLET ORAL at 08:56

## 2022-03-07 RX ADMIN — METHYLPREDNISOLONE 40 MG: 16 TABLET ORAL at 14:56

## 2022-03-07 RX ADMIN — HYDROCODONE BITARTRATE AND ACETAMINOPHEN 2 TABLET: 5; 325 TABLET ORAL at 20:40

## 2022-03-07 RX ADMIN — BUDESONIDE AND FORMOTEROL FUMARATE DIHYDRATE 2 PUFF: 80; 4.5 AEROSOL RESPIRATORY (INHALATION) at 07:55

## 2022-03-07 RX ADMIN — PENICILLIN V POTASSIUM 500 MG: 250 TABLET ORAL at 19:15

## 2022-03-07 ASSESSMENT — PAIN SCALES - GENERAL
PAINLEVEL_OUTOF10: 6
PAINLEVEL_OUTOF10: 8
PAINLEVEL_OUTOF10: 8
PAINLEVEL_OUTOF10: 6
PAINLEVEL_OUTOF10: 7
PAINLEVEL_OUTOF10: 8
PAINLEVEL_OUTOF10: 9

## 2022-03-07 ASSESSMENT — PAIN DESCRIPTION - PROGRESSION

## 2022-03-07 ASSESSMENT — PAIN DESCRIPTION - PAIN TYPE
TYPE: CHRONIC PAIN
TYPE: CHRONIC PAIN

## 2022-03-07 ASSESSMENT — PAIN DESCRIPTION - DESCRIPTORS
DESCRIPTORS: ACHING
DESCRIPTORS: ACHING;DISCOMFORT

## 2022-03-07 ASSESSMENT — PAIN DESCRIPTION - ONSET: ONSET: ON-GOING

## 2022-03-07 ASSESSMENT — PAIN DESCRIPTION - FREQUENCY
FREQUENCY: INTERMITTENT
FREQUENCY: CONTINUOUS

## 2022-03-07 ASSESSMENT — PAIN DESCRIPTION - LOCATION
LOCATION: HIP
LOCATION: HIP

## 2022-03-07 ASSESSMENT — PAIN DESCRIPTION - ORIENTATION
ORIENTATION: LEFT
ORIENTATION: LEFT

## 2022-03-07 ASSESSMENT — PAIN - FUNCTIONAL ASSESSMENT: PAIN_FUNCTIONAL_ASSESSMENT: PREVENTS OR INTERFERES SOME ACTIVE ACTIVITIES AND ADLS

## 2022-03-07 NOTE — CARE COORDINATION
Uma Christy from [address] states that patient is accepted and a precert will be started.
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
## 2022-03-07 NOTE — PROGRESS NOTES
[address]  CDU / OBSERVATION ENCOUNTER  ATTENDING NOTE       I performed a history and physical examination of the patient and discussed management with the resident. I reviewed the residents note and agree with the documented findings and plan of care. Any areas of disagreement are noted on the chart. I was personally present for the key portions of any procedures. I have documented in the chart those procedures where I was not present during the key portions. I have reviewed the nurses notes. I agree with the chief complaint, past medical history, past surgical history, allergies, medications, social and family history as documented unless otherwise noted below. The Family history, social history, and ROS are effectively unchanged since admission unless noted elsewhere in the chart. The patient is a 74year old undomiciled female with history of pancreatic cancer who was admitted for pain control. The patient has been unable to complete chemotherapy as scheduled and is noncompliant with her medications due to social constraints. She is also being treated with penicillin for a dental infection. Heme/Onc has been consulted. Her pain has been well controlled today. Plan for placement.      Raffaele Tariq DO  Attending Emergency Physician

## 2022-03-07 NOTE — PLAN OF CARE
Problem: Falls - Risk of:  Goal: Will remain free from falls  Description: Will remain free from falls  3/7/2022 1854 by Geneva Quiroz RN  Outcome: Ongoing  3/7/2022 0818 by Prabhjot Elmore RN  Outcome: Ongoing  Goal: Absence of physical injury  Description: Absence of physical injury  3/7/2022 1854 by Geneva Quiroz RN  Outcome: Ongoing  3/7/2022 0818 by Prabhjot Elmore RN  Outcome: Ongoing     Problem: Pain:  Description: Pain management should include both nonpharmacologic and pharmacologic interventions. Goal: Pain level will decrease  Description: Pain level will decrease  3/7/2022 1854 by Geneva Quiroz RN  Outcome: Ongoing  3/7/2022 0818 by Prabhjot Elmore RN  Outcome: Ongoing  Goal: Control of acute pain  Description: Control of acute pain  3/7/2022 1854 by Geneva Quiroz RN  Outcome: Ongoing  3/7/2022 0818 by Prabhjot Elmore RN  Outcome: Ongoing  Goal: Control of chronic pain  Description: Control of chronic pain  3/7/2022 1854 by Geneva Quiroz RN  Outcome: Ongoing  3/7/2022 0818 by Prabhjot Elmore RN  Outcome: Ongoing     Problem: IP BALANCE  Goal: BALANCE EDUCATION  Description: Educate patients on maintaining dynamic/static standing/sitting balance, with/without upper extremity support.   3/7/2022 1854 by Geenva Quiroz RN  Outcome: Ongoing  3/7/2022 0818 by Prabhjot Elmore RN  Outcome: Ongoing     Problem: IP MOBILITY  Goal: LTG - patient will ambulate household distance  3/7/2022 1854 by Geneva Quiroz RN  Outcome: Ongoing  3/7/2022 0818 by Prabhjot Elmore RN  Outcome: Ongoing

## 2022-03-07 NOTE — PROGRESS NOTES
Physical Therapy  Facility/Department: [address] MED SURG  Daily Treatment Note  NAME: Emi Daniels  : 1955  MRN: [id number]    Date of Service: 3/7/2022    Discharge Recommendations:  Patient would benefit from continued therapy after discharge   PT Equipment Recommendations  Equipment Needed: Yes  Walker: Rolling  Other: Her listed height is 4' 11''    Assessment   Body structures, Functions, Activity limitations: Decreased functional mobility ; Decreased strength;Decreased balance;Decreased endurance  Assessment: Pt able to complete bed mobility with SBA, ambulated 25ft x2 with CGA and use of RW. Pt demonstrated very decreased endurance, SOB with all aspects of mobility. Pt c/o L hip pain with ambulation, grossly steady for short distances with RW. Pt would benefit from continued PT to address deficits, would require 24 hr assistance secondary to endurance deficits and generalized weakness  Prognosis: Good  PT Education: Goals; General Safety;Gait Training;PT Role;Transfer Training;Precautions; Functional Mobility Training;Home Exercise Program;Energy Conservation  REQUIRES PT FOLLOW UP: Yes  Activity Tolerance  Activity Tolerance: Patient limited by fatigue;Patient limited by endurance     Patient Diagnosis(es): The primary encounter diagnosis was Other migraine without status migrainosus, not intractable. A diagnosis of Social problem was also pertinent to this visit.      has a past medical history of Appendicitis, Cerebral artery occlusion with cerebral infarction ([address] CHF (congestive heart failure) ([address] Chronic respiratory failure with hypoxia and hypercapnia (HCC), Chronic rhinitis, Cigarette smoker, COPD (chronic obstructive pulmonary disease) ([address] Depression, Dysphagia, Essential hypertension, GERD (gastroesophageal reflux disease), Heart failure, diastolic, with acute decompensation ([address] Hepatitis C, chronic ([address] History of smoking at least 1 pack per day for at least 30 years, Hyperlipidemia, Hypertension, Migraine, Moderate COPD (chronic obstructive pulmonary disease) (HCC), Multiple transfusions, Neurogenic dysphagia, Osteoarthritis, Pneumonia, Scoliosis, and Substance abuse ([facility] [address] has a past surgical history that includes  section; LEEP (); Upper gastrointestinal endoscopy (); Gastric bypass surgery; Appendectomy (2017); laparoscopic appendectomy (N/A, 3/27/2017); Upper gastrointestinal endoscopy (2018); Upper gastrointestinal endoscopy (N/A, 2018); pr colonoscopy w/biopsy single/multiple (N/A, 11/10/2018); pr egd transoral biopsy single/multiple (N/A, 2018); hip surgery (Right, 2019); Total hip arthroplasty (Right, 2019); Medication Injection (Left, 3/25/2021); Upper gastrointestinal endoscopy (N/A, 2021); other surgical history (Left, 2021); Medication Injection (Left, 2021); and Medication Injection (Left, 2021). Restrictions  Restrictions/Precautions  Restrictions/Precautions: Up as Tolerated  Subjective   General  Response To Previous Treatment: Patient with no complaints from previous session. Family / Caregiver Present: No  Subjective  Subjective: Pt and RN agreeable to PT. Pt alert in bed upon arrival, very pleasant and cooperative with treatment. General Comment  Comments: Pt left in bed with call light within reach, alarm activated  Pain Screening  Patient Currently in Pain: Yes  Pain Assessment  Pain Assessment: 0-10  Pain Level: 8  Pain Type: Chronic pain  Pain Location: Hip  Pain Orientation: Left  Pain Descriptors: Aching;Discomfort  Pain Frequency: Intermittent  Clinical Progression: Not changed  Functional Pain Assessment: Prevents or interferes some active activities and ADLs  Non-Pharmaceutical Pain Intervention(s): Ambulation/Increased Activity;Repositioned; Therapeutic presence  Response to Pain Intervention: Patient Satisfied  Vital Signs  Patient Currently in Pain: Yes Orientation  Orientation  Overall Orientation Status: Within Functional Limits  Cognition      Objective   Bed mobility  Supine to Sit: Stand by assistance  Sit to Supine: Stand by assistance  Scooting: Stand by assistance  Comment: HOB slightly elevated, increased time to complete  Transfers  Sit to Stand: Contact guard assistance  Stand to sit: Contact guard assistance  Comment: vc's for UE placement and sequencing with good return demo  Ambulation  Ambulation?: Yes  Ambulation 1  Surface: level tile  Device: Rolling Walker  Assistance: Contact guard assistance  Quality of Gait: slow, cautious gait. Slightly flexed posture  Gait Deviations: Slow Geovanna;Decreased step length  Distance: 25ft x2  Comments: SOB with activity, resloves quickly with rest. Pt denies dizziness  Stairs/Curb  Stairs?: No     Balance  Posture: Fair  Sitting - Static: Good;-  Sitting - Dynamic: Fair;+  Standing - Static: Fair  Standing - Dynamic: Fair  Comments: standing balance assessed with RW  Exercises  Hip Flexion: 20x bilat seated EOB  Hip Abduction: 20x bilat seated EOB  Knee Long Arc Quad: 20x bilat seated EOB  Ankle Pumps: 20x bilat seated EOB     Upper extremity exercises: Bicep curl, shoulder flexion/extension, punches. Reps: 10 bilat     AM-PAC Score  AM-PAC Inpatient Mobility Raw Score : 18 (03/07/22 1455)  AM-PAC Inpatient T-Scale Score : 43.63 (03/07/22 1455)  Mobility Inpatient CMS 0-100% Score: 46.58 (03/07/22 1455)  Mobility Inpatient CMS G-Code Modifier : CK (03/07/22 1455)    Goals  Short term goals  Time Frame for Short term goals: 14 visits  Short term goal 1: Sit to/from stand with independence. Short term goal 2: Ambulate 100' with walker with SBA. Short term goal 3: Improve standing balance to fair dynamic.     Plan    Plan  Times per week: 5-6x/wk  Times per day: Daily  Current Treatment Recommendations: Strengthening,Home Exercise Program,Safety Education & Training,Endurance Training,Functional Mobility Training,Transfer Training,Gait Training,Equipment Evaluation, Education, & procurement,Patient/Caregiver Education & Training  Safety Devices  Type of devices:  All fall risk precautions in place,Call light within reach,Gait belt,Left in bed,Bed alarm in place,Nurse notified  Restraints  Initially in place: No     Therapy Time   Individual Concurrent Group Co-treatment   Time In 1782         Time Out 1439         Minutes 24         Timed Code Treatment Minutes: [address] [place], [place]

## 2022-03-07 NOTE — TELEPHONE ENCOUNTER
Attempted to reach patient to schedule appointment, a lady answered, took message to have patient contact her provider office to schedule appt.

## 2022-03-07 NOTE — PLAN OF CARE
Problem: Falls - Risk of:  Goal: Will remain free from falls  Description: Will remain free from falls  Outcome: Ongoing  Goal: Absence of physical injury  Description: Absence of physical injury  Outcome: Ongoing     Problem: Pain:  Goal: Pain level will decrease  Description: Pain level will decrease  Outcome: Ongoing  Goal: Control of acute pain  Description: Control of acute pain  Outcome: Ongoing  Goal: Control of chronic pain  Description: Control of chronic pain  Outcome: Ongoing     Problem: IP BALANCE  Goal: BALANCE EDUCATION  Description: Educate patients on maintaining dynamic/static standing/sitting balance, with/without upper extremity support.   Outcome: Ongoing     Problem: IP MOBILITY  Goal: LTG - patient will ambulate household distance  Outcome: Ongoing

## 2022-03-07 NOTE — DISCHARGE INSTR - COC
Continuity of Care Form    Patient Name: Juvencio Arboleda   :  1955  MRN:  [id number]    Admit date:  3/4/2022  Discharge date:  3/8/22    Code Status Order: Full Code   Advance Directives:      Admitting Physician:  Sharita Velez MD  PCP: Ameena oCnnor MD    Discharging Nurse: [facility] Unit/Room#: 5264/6817-80  Discharging Unit Phone Number: [phone]    Emergency Contact:   Extended Emergency Contact Information  Primary Emergency Contact: Lauri Ny  Home Phone: [phone]  Mobile Phone: [phone]  Relation: Child    Past Surgical History:  Past Surgical History:   Procedure Laterality Date    APPENDECTOMY  2017     SECTION      GASTRIC BYPASS SURGERY      HIP SURGERY Right 2019    HIP TOTAL ARTHROPLASTY ANTERIOR APPROACH - [place],  C-ARM, (Right )    LAPAROSCOPIC APPENDECTOMY N/A 3/27/2017    APPENDECTOMY LAPAROSCOPIC performed by Gwendolyn Saenz MD at [address] [place] [address]    [email or web address]    MEDICATION INJECTION Left 3/25/2021    STEROID INJECTION LEFT HIP  WITH FLUORO performed by Te Tineo MD at [address] [place] [address] Left 2021    INJECTION STEROID LEFT HIP WITH FLUORO- ATTEMPTED performed by Te Tineo MD at [address] [place] [address] 2021    INJECTION STEROID LEFT HIP performed by Te Tineo MD at [address] [facility] Left 2021    hip injection    IN COLONOSCOPY W/BIOPSY SINGLE/MULTIPLE N/A 11/10/2018    COLONOSCOPY WITH BIOPSY performed by Lisa Haile MD at [facility]    IN EGD TRANSORAL BIOPSY SINGLE/MULTIPLE N/A 2018    EGD ESOPHAGOGASTRODUODENOSCOPY performed by Emilio Peck MD at [facility] Right 2019    HIP TOTAL ARTHROPLASTY ANTERIOR APPROACH - [place]  C-ARM, performed by Kristen Patel DO at [address]      oesophagitis, candidiasis    UPPER GASTROINTESTINAL ENDOSCOPY  2018    UPPER GASTROINTESTINAL ENDOSCOPY N/A 11/5/2018    EGD CONTROL HEMORRHAGE performed by Sarah Hwang MD at [address] 4/5/2021    EGD W/EUS FNA performed by Meena Price MD at [facility]       Immunization History:   Immunization History   Administered Date(s) Administered    COVID-19, Luis Gallus, Primary or Immunocompromised, PF, 100mcg/0.5mL 07/13/2021, 08/10/2021       Active Problems:  Patient Active Problem List   Diagnosis Code    Essential hypertension I10    Hepatitis C B19.20    History of substance abuse ([address] F19.11    Chronic heart failure with reduced ejection fraction and diastolic dysfunction (HCC) P92.82    Alcoholic cirrhosis of liver without ascites (HCC) K70.30    PUD (peptic ulcer disease) K27.9    Anemia D64.9    Duodenal ulcer with hemorrhage K26.4    Psychosis, intermittent requring antipsychotic meds F29    Opiate withdrawal ([address] F11.23    Pneumonia J18.9    Pancreas head lesion S36.200A    Polysubstance abuse ([address] F19.10    Right flank pain R10.9    Avascular necrosis of bone of right hip ([address] M87.051    Closed fracture of head of right femur ([address] S72.051A    COPD without exacerbation ([address] J44.9    Hypokalemia E87.6    History of hepatitis C Z86.19    Avascular necrosis (HCC) M87.00    History of total right hip replacement Z96.641    Protein calorie malnutrition (HCC) E46    Chronic diastolic heart failure (HCC) I50.32    Right sided weakness R53.1    Chronic daily headache R51.9    Memory changes R41.3    Generalized weakness R53.1    Stroke with cerebral ischemia (HCC) I63.9    SOB (shortness of breath) R06.02    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.052    Pulmonary embolism on right (HCC) I26.99    Multiple subsegmental pulmonary emboli without acute cor pulmonale (HCC) I26.94    Acute on chronic blood loss anemia D62    Severe comorbid illness R69    Primary osteoarthritis of left hip M16.12    Chronic anticoagulation Z79.01    Chronic idiopathic constipation K59.04    DDD (degenerative disc disease), lumbar M51.36    Medication management Z79.899    Pancreatic mass K86.89    Homeless Z59.00    Colitis K52.9    Epigastric pain R10.13    Right-sided chest wall pain R07.89    CRYSTAL (acute kidney injury) ([facility] N17.9    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 A0    Hypoglycemia E16.2    History of pulmonary embolism Z86.711    Noncompliance Z91.19    Crack cocaine use F14.90    Moderate episode of recurrent major depressive disorder (HCC) F33.1    Atelectasis J98.11    Abdominal pain R10.9    Migraine G43.909    Migraine variants, not intractable G43.809    Pain of right hip joint M25.551    Arthritis M19.90    Hip arthritis M16.10    Migraine aura without headache G43.109       Isolation/Infection:   Isolation            No Isolation          Patient Infection Status       Infection Onset Added Last Indicated Last Indicated By Review Planned Expiration Resolved Resolved By    None active    Resolved    COVID-19 (Rule Out) 03/04/22 03/04/22 03/04/22 COVID-19, Rapid (Ordered)   03/04/22 Rule-Out Test Resulted    COVID-19 (Rule Out) 06/05/21 06/05/21 06/05/21 COVID-19, Rapid (Ordered)   06/05/21 Rule-Out Test Resulted    COVID-19 (Rule Out) 05/05/21 05/05/21 05/05/21 COVID-19, Rapid (Ordered)   05/05/21 Rule-Out Test Resulted    COVID-19 (Rule Out) 03/29/21 03/29/21 03/29/21 COVID-19, Rapid (Ordered)   03/29/21 Rule-Out Test Resulted    COVID-19 (Rule Out) 07/19/20 07/19/20 07/19/20 COVID-19 (Ordered)   07/19/20 Rule-Out Test Resulted    COVID-19 (Rule Out) 04/24/20 04/24/20 04/24/20 COVID-19 (Ordered)   04/25/20 Rule-Out Test Resulted            Nurse Assessment:  Last Vital Signs: /82   Pulse 69   Temp 97.8 °F (36.6 °C) (Oral)   Resp 16   Ht 4' 11\" (1.499 m)   Wt 85 lb (38.6 kg)   LMP 03/18/2015   SpO2 96%   BMI 17.17 kg/m²     Last documented pain score (0-10 scale): Pain Level: 8  Last Weight:   Wt Readings from Last 1 Encounters:   03/04/22 85 lb (38.6 kg)     Mental Status:  oriented and alert    IV Access:  - None    Nursing Mobility/ADLs:  Walking   Assisted  Transfer  Independent  Bathing  Independent  Dressing  Independent  [address] Delivery   none    Wound Care Documentation and Therapy:  Puncture Hip Anterior; Left (Active)   Number of days:         Elimination:  Continence: Bowel: Yes  Bladder: Yes  Urinary Catheter: None   Colostomy/Ileostomy/Ileal Conduit: No       Date of Last BM: 3/7/22  No intake or output data in the 24 hours ending 03/07/22 1317  No intake/output data recorded. Safety Concerns: At Risk for Falls    Impairments/Disabilities:      None    Nutrition Therapy:  Current Nutrition Therapy:   - Oral Diet:  General    Routes of Feeding: Oral  Liquids: No Restrictions  Daily Fluid Restriction: no  Last Modified Barium Swallow with Video (Video Swallowing Test): not done    Treatments at the Time of Hospital Discharge:   Respiratory Treatments: yes  Oxygen Therapy:  is not on home oxygen therapy.   Ventilator:    - No ventilator support    Rehab Therapies: Physical Therapy and Occupational Therapy  Weight Bearing Status/Restrictions: No weight bearing restirctions  Other Medical Equipment (for information only, NOT a DME order):  n/a  Other Treatments: OT/PT    Patient's personal belongings (please select all that are sent with patient):  None    RN SIGNATURE:  Electronically signed by Sara Oseguera RN on 3/8/22 at 11:18 AM EST    CASE MANAGEMENT/SOCIAL WORK SECTION    Inpatient Status Date: ***    Readmission Risk Assessment Score:  Readmission Risk              Risk of Unplanned Readmission:  0           Discharging to Facility/ Agency   Name: [facility]  Address:  [address] [place]  [address]         Phone: [phone]       Fax: [phone]        Phone:  Fax:    Dialysis Facility (if applicable)   Name:  Address:  Dialysis Schedule:  Phone:  Fax:    / signature: Electronically signed by Sintia Neri RN on 3/8/22 at 10:01 AM EST    PHYSICIAN SECTION    Prognosis: Fair    Condition at Discharge: Stable    Rehab Potential (if transferring to Rehab): Good    Recommended Labs or Other Treatments After Discharge:     Physician Certification: I certify the above information and transfer of Emi Daniels  is necessary for the continuing treatment of the diagnosis listed and that she requires [facility] for less 30 days.      Update Admission H&P: No change in H&P    PHYSICIAN SIGNATURE:  Electronically signed by Zahira Wellington DO on 3/7/22 at 1:17 PM EST

## 2022-03-07 NOTE — CONSULTS
_                         Today's Date: 3/7/2022  Patient Name: Drake Carrasquillo  Date of admission: 3/4/2022  4:46 PM  Patient's age: 74 y.o., 1955  Admission Dx: Migraine aura without headache [G43.109]  Social problem [Z65.9]  Other migraine without status migrainosus, not intractable [G43.809]      Requesting Physician: Reza Loera MD    CHIEF COMPLAINT: Headaches and hip pain. Consult for history of pancreatic cancer. History Obtained From:  patient, electronic medical record    HISTORY OF PRESENT ILLNESS:      The patient is a 74 y.o.  female who is admitted to the hospital for further management of migraine headaches with aura. Patient states that she has chronic migraines and she was on Fioricet treatment and recently she had no improvement on treatment so she presented in was subsequently admitted. She is feeling better now on Norco and Fioricet. She also had hip pain which is a chronic problem as well. Patient has no abdominal pain. No nausea or vomiting. The patient had history of tumor in the head of the pancreas. 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 The patient was seen at [facility] July 2021. She was prescribed treatment in the form of pills (which I believe is imatinib), but patient used it for 2 weeks and a family member threw the medicines away. She had no evaluation at the cancer center since the holidays. She had problem with transportation. Patient denies any GI bleeding. No change in the bowel movements.     Past Medical History:   has a past medical history of Appendicitis, Cerebral artery occlusion with cerebral infarction [facility]), CHF (congestive heart failure) ([facility] Chronic respiratory failure with hypoxia and hypercapnia ([facility] Chronic rhinitis, Cigarette smoker, COPD (chronic obstructive pulmonary disease) ([facility] Depression, Dysphagia, Essential hypertension, GERD (gastroesophageal reflux disease), Heart failure, diastolic, with acute decompensation ([facility] Hepatitis C, chronic (HCC), History of smoking at least 1 pack per day for at least 30 years, Hyperlipidemia, Hypertension, Migraine, Moderate COPD (chronic obstructive pulmonary disease) ([facility] Multiple transfusions, Neurogenic dysphagia, Osteoarthritis, Pneumonia, Scoliosis, and Substance abuse ([facility] Past Surgical History:   has a past surgical history that includes  section; LEEP (); Upper gastrointestinal endoscopy (); Gastric bypass surgery; Appendectomy (2017); laparoscopic appendectomy (N/A, 3/27/2017); Upper gastrointestinal endoscopy (2018); Upper gastrointestinal endoscopy (N/A, 2018); pr colonoscopy w/biopsy single/multiple (N/A, 11/10/2018); pr egd transoral biopsy single/multiple (N/A, 2018); hip surgery (Right, 2019); Total hip arthroplasty (Right, 2019); Medication Injection (Left, 3/25/2021); Upper gastrointestinal endoscopy (N/A, 2021); other surgical history (Left, 2021); Medication Injection (Left, 2021); and Medication Injection (Left, 2021). Family History: family history includes Breast Cancer in her mother; Heart Disease in her father. Social History:   reports that she has been smoking cigarettes. She started smoking about 52 years ago. She has a 20.00 pack-year smoking history. She has never used smokeless tobacco. She reports that she does not drink alcohol and does not use drugs. Medications:    Prior to Admission medications    Medication Sig Start Date End Date Taking?  Authorizing Provider   ondansetron (ZOFRAN-ODT) 8 MG TBDP disintegrating tablet  21  Yes Historical Provider, MD   gabapentin (NEURONTIN) 300 MG capsule  21  Yes Historical Provider, MD   lisinopril (PRINIVIL;ZESTRIL) 10 MG tablet Take 1 tablet by mouth daily 21  Yes Hortensia Boswell DO   carvedilol (COREG) 6.25 MG tablet Take 1 tablet by mouth 2 times daily (with meals) 4/6/21  Yes Jason Yepez,    atorvastatin (LIPITOR) 40 MG tablet Take 1 tablet by mouth nightly 4/6/21  Yes Jason Yepez DO   apixaban (ELIQUIS) 5 MG TABS tablet Take 1 tablet by mouth 2 times daily 4/6/21  Yes Jason Yepez DO   amitriptyline (ELAVIL) 10 MG tablet Take 1 tablet by mouth nightly 4/6/21  Yes Jason Yepez DO   vitamin B-1 100 MG tablet Take 1 tablet by mouth daily 10/13/18  Yes Alverto Gonzalez MD   vitamin B-12 250 MCG tablet Take 1 tablet by mouth daily 10/13/18  Yes Alverto Gonzalez MD   gabapentin (NEURONTIN) 300 MG capsule Take 1 capsule by mouth 3 times daily for 30 days.  Intended supply: 90 days 9/27/21 10/27/21  Sindy Ramos, APRN - CNP   mirtazapine (REMERON) 30 MG tablet  9/6/21   Historical Provider, MD   [address] 4-1 % [facility]  9/13/21   Historical Provider, MD   imatinib (GLEEVEC) 400 MG chemo tablet  9/14/21   Historical Provider, MD   famotidine (PEPCID) 20 MG tablet  8/24/21   Historical Provider, MD   butalbital-acetaminophen-caffeine (FIORICET, ESGIC) -40 MG per tablet Take 1 tablet by mouth every 12 hours for 14 days 5/13/21 9/27/21  E Cheli Leach MD   ferrous sulfate (FE TABS 325) 325 (65 Fe) MG EC tablet Take 1 tablet by mouth daily 5/14/21   E Cheli Leach MD   amitriptyline (ELAVIL) 10 MG tablet Take 1 tablet by mouth nightly for 14 days 5/13/21 9/27/21  E Cheli Leach MD   atorvastatin (LIPITOR) 40 MG tablet Take 1 tablet by mouth daily for 14 days 5/13/21 9/27/21  E Cheli Leach MD   acetaminophen (TYLENOL) 500 MG tablet Take 2 tablets by mouth every 6 hours as needed for Pain 5/7/21   Tara Leung,    mometasone-formoterol (DULERA) 100-5 MCG/ACT inhaler Inhale 2 puffs into the lungs 2 times daily 4/6/21   Jason Yepez DO   clopidogrel (PLAVIX) 75 MG tablet Take 1 tablet by mouth daily 4/6/21   Jason Yepez DO   albuterol sulfate HFA (PROVENTIL HFA) 108 (90 Base) MCG/ACT inhaler Inhale 1-2 puffs into the lungs once for 1 dose 4/6/21 9/27/21  Hortensia Boswell DO   amLODIPine (NORVASC) 10 MG tablet Take 1 tablet by mouth daily 2/9/21 2/9/21  Brandon Lopez MD   [facility]  Devices [facility]) MISC 1 Device by Does not apply route once for 1 dose 8/5/19 6/6/21  Abbe Burrell PA-C   ipratropium-albuterol (DUONEB) 0.5-2.5 (3) MG/3ML SOLN nebulizer solution Inhale 3 mLs into the lungs every 4 hours as needed for Shortness of Breath 1/11/19   Montserrat Miller MD   Multiple Vitamins-Minerals (THERAPEUTIC MULTIVITAMIN-MINERALS) tablet Take 1 tablet by mouth daily 8/12/18   Husam Sanderson MD     Current Facility-Administered Medications   Medication Dose Route Frequency Provider Last Rate Last Admin    penicillin v potassium (VEETID) tablet 500 mg  500 mg Oral 4 times per day Morgan Vila MD   500 mg at 03/07/22 1458    lidocaine 4 % external patch 1 patch  1 patch TransDERmal Daily Morgan Vila MD   1 patch at 03/07/22 0852    methylPREDNISolone (MEDROL) tablet 40 mg  40 mg Oral Daily Juana Brown MD   40 mg at 03/07/22 1456    oxyCODONE (ROXICODONE) immediate release tablet 2.5 mg  2.5 mg Oral Q4H PRN Orma Katy Brown MD        HYDROcodone-acetaminophen (NORCO) 5-325 MG per tablet 2 tablet  2 tablet Oral Q6H PRN Kp Che MD   2 tablet at 03/07/22 1459    apixaban (ELIQUIS) tablet 5 mg  5 mg Oral BID Zaira DO Tacos   5 mg at 03/07/22 0853    butalbital-acetaminophen-caffeine (FIORICET, ESGIC) per tablet 1 tablet  1 tablet Oral Q4H PRN Kp Che MD   1 tablet at 03/07/22 0900    ipratropium-albuterol (DUONEB) nebulizer solution 1 ampule  1 ampule Inhalation Q4H WA Morgan Vila MD   1 ampule at 03/07/22 1624    lisinopril (PRINIVIL;ZESTRIL) tablet 10 mg  10 mg Oral Daily Shawn Mills MD   10 mg at 03/07/22 0853    ondansetron (ZOFRAN-ODT) disintegrating tablet 4 mg  4 mg Oral Q8H PRN Shawn Mills MD   4 mg at 03/05/22 2107    acetaminophen (TYLENOL) tablet 1,000 mg  1,000 mg Oral Q6H PRN Zaira DO Tacos   1,000 mg at 03/05/22 1450    amitriptyline (ELAVIL) tablet 10 mg  10 mg Oral Nightly Lavona Elks, DO   10 mg at 03/06/22 2029    atorvastatin (LIPITOR) tablet 40 mg  40 mg Oral Nightly Lavona Elks, DO   40 mg at 03/06/22 2029    clopidogrel (PLAVIX) tablet 75 mg  75 mg Oral Daily Lavona Elks, DO   75 mg at 03/07/22 8833    budesonide-formoterol (SYMBICORT) 80-4.5 MCG/ACT inhaler 2 puff  2 puff Inhalation BID Lavona Elks, DO   2 puff at 03/07/22 1418    sodium chloride flush 0.9 % injection 5-40 mL  5-40 mL IntraVENous 2 times per day Lavona Elks, DO   10 mL at 03/05/22 4983    sodium chloride flush 0.9 % injection 5-40 mL  5-40 mL IntraVENous PRN Lavona Elks, DO        0.9 % sodium chloride infusion  25 mL IntraVENous PRN Lavona Elks, DO        potassium chloride (KLOR-CON M) extended release tablet 40 mEq  40 mEq Oral PRN Lavona Elks, DO        Or    potassium bicarb-citric acid (EFFER-K) effervescent tablet 40 mEq  40 mEq Oral PRN Lavona Elks, DO        Or    potassium chloride 10 mEq/100 mL IVPB (Peripheral Line)  10 mEq IntraVENous PRN Lavona Elks, DO        polyethylene glycol (GLYCOLAX) packet 17 g  17 g Oral Daily PRN Lavona Elks, DO        carvedilol (COREG) tablet 6.25 mg  6.25 mg Oral BID  Solange Wang MD   6.25 mg at 03/06/22 2156       Allergies:  Aspirin and Keflet [cephalexin]    REVIEW OF SYSTEMS:      · General: Positive for weakness and fatigue. Positive for weight loss and decreased appetite. No fever or chills. · Eyes: No blurred vision, eye pain or double vision. · Ears: No hearing problems or drainage. No tinnitus. · Throat: No sore throat, problems with swallowing or dysphagia. · Respiratory: No cough, sputum or hemoptysis. No shortness of breath. No pleuritic chest pain. · Cardiovascular: No chest pain, orthopnea or PND. No lower extremity edema. No palpitation. · Gastrointestinal: As above.     · Genitourinary: No dysuria, hematuria, frequency or urgency. · Musculoskeletal: Hips pain. .  · Dermatologic: No skin rashes or pruritus. No skin lesions or discolorations. · Psychiatric: No depression, anxiety, or stress or signs of schizophrenia. No change in mood or affect. · Hematologic: No history of bleeding tendency. No bruises or ecchymosis. No history of clotting problems. · Infectious disease: No fever, chills or frequent infections. · Endocrine: No polydipsia or polyuria. No temperature intolerance. · Neurologic: Positive for migraine headaches no dizziness. No weakness or numbness of the extremities. No changes in balance, coordination,  memory, mentation, behavior. · Allergic/Immunologic: No nasal congestion or hives. No repeated infections. PHYSICAL EXAM:      /82   Pulse 69   Temp 97.8 °F (36.6 °C) (Oral)   Resp 16   Ht 4' 11\" (1.499 m)   Wt 85 lb (38.6 kg)   LMP 2015   SpO2 96%   BMI 17.17 kg/m²    Temp (24hrs), Av.2 °F (36.8 °C), Min:97.8 °F (36.6 °C), Max:98.6 °F (37 °C)      General appearance - not in pain or distress. Looks chronically ill. Mental status - alert and oriented  Eyes - pupils equal and reactive, extraocular eye movements intact  Ears - bilateral TM's and external ear canals normal  Nose - normal and patent, no erythema, discharge or polyps  Mouth - mucous membranes moist, pharynx normal without lesions  Neck - supple, no significant adenopathy  Lymphatics - no palpable lymphadenopathy, no hepatosplenomegaly  Chest - clear to auscultation, no wheezes, rales or rhonchi, symmetric air entry  Heart - normal rate, regular rhythm, normal S1, S2, no murmurs, rubs, clicks or gallops  Abdomen - soft, mild tenderness. , nondistended, no masses or organomegaly  Neurological - alert, oriented, normal speech, no focal findings or movement disorder noted  Musculoskeletal - no joint tenderness, deformity or swelling  Extremities - peripheral pulses normal, no pedal edema, no clubbing or cyanosis  Skin - normal coloration and turgor, no rashes, no suspicious skin lesions noted           DATA:      Labs:       CBC:   Recent Labs     03/06/22  0901   WBC 5.7   HGB 16.1*   HCT 49.3*        BMP:   Recent Labs     03/06/22  0901      K 4.0   CO2 21   BUN 15   CREATININE 0.74   LABGLOM >60   GLUCOSE 133*     PT/INR: No results for input(s): PROTIME, INR in the last 72 hours. APTT:No results for input(s): APTT in the last 72 hours. LIVER PROFILE:No results for input(s): AST, ALT, LABALBU in the last 72 hours. XR CHEST PORTABLE  Narrative: EXAMINATION:  ONE XRAY VIEW OF THE CHEST    3/4/2022 5:47 pm    COMPARISON:  5 June 2021    HISTORY:  ORDERING SYSTEM PROVIDED HISTORY: SOB  TECHNOLOGIST PROVIDED HISTORY:  SOB    FINDINGS:  AP view of the chest time stamped at 1743 hours submitted. Heart size is  normal.  No vascular congestion, focal consolidation, effusion, or  pneumothorax is noted. Osseous and mediastinal structures are  age-appropriate. Lung fields are mildly hyperinflated. Impression: No acute cardiopulmonary process. Mild hyperinflation of the lung fields. 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 Records and labs and images were reviewed and discussed with the patient. 2. 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 Diagnosis was April 2021. She was established with oncologist in numbers of [facility], Dr. Becca Narayanan and was started on treatment likely imatinib. Patient used 2 weeks of treatment and family decided to throw the medicine away as per the patient. She has problem with transportation. 3. No active treatment for this cancer will be done during this hospitalization.   4. Patient will need  to arrange for transportation and follow-up with her oncologist at [facility]. 5. Patient's questions were answered to the best of her satisfaction and she verbalized full understanding and agreement. 6. Thank you for allowing us to participate in the care of this pleasant patient. Rodriguez Araujo MD, MD                            [address] [place] [address] [facility]                            This note is created with the assistance of a speech recognition program.  While intending to generate a document that actually reflects the content of the visit, the document can still have some errors including those of syntax and sound a like substitutions which may escape proof reading. It such instances, actual meaning can be extrapolated by contextual diversion.

## 2022-03-08 VITALS
WEIGHT: 85 LBS | TEMPERATURE: 97.7 F | OXYGEN SATURATION: 98 % | DIASTOLIC BLOOD PRESSURE: 118 MMHG | HEART RATE: 86 BPM | RESPIRATION RATE: 19 BRPM | BODY MASS INDEX: 17.14 KG/M2 | SYSTOLIC BLOOD PRESSURE: 160 MMHG | HEIGHT: 59 IN

## 2022-03-08 PROBLEM — Z78.9 UNABLE TO CARE FOR SELF: Status: ACTIVE | Noted: 2022-03-08

## 2022-03-08 PROCEDURE — 6370000000 HC RX 637 (ALT 250 FOR IP): Performed by: HEALTH CARE PROVIDER

## 2022-03-08 PROCEDURE — 6370000000 HC RX 637 (ALT 250 FOR IP): Performed by: STUDENT IN AN ORGANIZED HEALTH CARE EDUCATION/TRAINING PROGRAM

## 2022-03-08 PROCEDURE — 94761 N-INVAS EAR/PLS OXIMETRY MLT: CPT

## 2022-03-08 PROCEDURE — 6360000002 HC RX W HCPCS: Performed by: STUDENT IN AN ORGANIZED HEALTH CARE EDUCATION/TRAINING PROGRAM

## 2022-03-08 PROCEDURE — 94640 AIRWAY INHALATION TREATMENT: CPT

## 2022-03-08 PROCEDURE — 99232 SBSQ HOSP IP/OBS MODERATE 35: CPT | Performed by: INTERNAL MEDICINE

## 2022-03-08 PROCEDURE — 97116 GAIT TRAINING THERAPY: CPT

## 2022-03-08 PROCEDURE — 97535 SELF CARE MNGMENT TRAINING: CPT

## 2022-03-08 PROCEDURE — G0378 HOSPITAL OBSERVATION PER HR: HCPCS

## 2022-03-08 PROCEDURE — 1200000000 HC SEMI PRIVATE

## 2022-03-08 PROCEDURE — 6370000000 HC RX 637 (ALT 250 FOR IP): Performed by: EMERGENCY MEDICINE

## 2022-03-08 RX ORDER — PENICILLIN V POTASSIUM 500 MG/1
500 TABLET ORAL 4 TIMES DAILY
Qty: 40 TABLET | Refills: 0 | Status: SHIPPED | OUTPATIENT
Start: 2022-03-08 | End: 2022-03-18

## 2022-03-08 RX ORDER — METHYLPREDNISOLONE 8 MG/1
40 TABLET ORAL DAILY
Qty: 10 TABLET | Refills: 0 | Status: SHIPPED | OUTPATIENT
Start: 2022-03-09 | End: 2022-03-11

## 2022-03-08 RX ORDER — LIDOCAINE 4 G/G
1 PATCH TOPICAL DAILY
Qty: 14 PATCH | Refills: 0 | Status: ON HOLD | OUTPATIENT
Start: 2022-03-09 | End: 2022-04-21

## 2022-03-08 RX ORDER — HYDROCODONE BITARTRATE AND ACETAMINOPHEN 5; 325 MG/1; MG/1
2 TABLET ORAL EVERY 6 HOURS PRN
Qty: 20 TABLET | Refills: 0 | Status: SHIPPED | OUTPATIENT
Start: 2022-03-08 | End: 2022-03-13

## 2022-03-08 RX ADMIN — PENICILLIN V POTASSIUM 500 MG: 250 TABLET ORAL at 13:48

## 2022-03-08 RX ADMIN — PENICILLIN V POTASSIUM 500 MG: 250 TABLET ORAL at 08:01

## 2022-03-08 RX ADMIN — LISINOPRIL 10 MG: 10 TABLET ORAL at 09:14

## 2022-03-08 RX ADMIN — CARVEDILOL 6.25 MG: 6.25 TABLET, FILM COATED ORAL at 09:14

## 2022-03-08 RX ADMIN — APIXABAN 5 MG: 5 TABLET, FILM COATED ORAL at 09:14

## 2022-03-08 RX ADMIN — PENICILLIN V POTASSIUM 500 MG: 250 TABLET ORAL at 01:15

## 2022-03-08 RX ADMIN — METHYLPREDNISOLONE 40 MG: 16 TABLET ORAL at 09:14

## 2022-03-08 RX ADMIN — IPRATROPIUM BROMIDE AND ALBUTEROL SULFATE 1 AMPULE: .5; 2.5 SOLUTION RESPIRATORY (INHALATION) at 08:12

## 2022-03-08 RX ADMIN — CLOPIDOGREL 75 MG: 75 TABLET, FILM COATED ORAL at 09:14

## 2022-03-08 RX ADMIN — IPRATROPIUM BROMIDE AND ALBUTEROL SULFATE 1 AMPULE: .5; 2.5 SOLUTION RESPIRATORY (INHALATION) at 11:09

## 2022-03-08 RX ADMIN — BUDESONIDE AND FORMOTEROL FUMARATE DIHYDRATE 2 PUFF: 80; 4.5 AEROSOL RESPIRATORY (INHALATION) at 08:12

## 2022-03-08 RX ADMIN — ACETAMINOPHEN 1000 MG: 500 TABLET ORAL at 15:42

## 2022-03-08 RX ADMIN — HYDROCODONE BITARTRATE AND ACETAMINOPHEN 2 TABLET: 5; 325 TABLET ORAL at 11:58

## 2022-03-08 RX ADMIN — BUTALBITAL, ACETAMINOPHEN AND CAFFEINE 1 TABLET: 50; 325; 40 TABLET ORAL at 06:03

## 2022-03-08 RX ADMIN — IPRATROPIUM BROMIDE AND ALBUTEROL SULFATE 1 AMPULE: .5; 2.5 SOLUTION RESPIRATORY (INHALATION) at 15:34

## 2022-03-08 ASSESSMENT — PAIN DESCRIPTION - PAIN TYPE
TYPE: ACUTE PAIN
TYPE: CHRONIC PAIN
TYPE: ACUTE PAIN

## 2022-03-08 ASSESSMENT — PAIN DESCRIPTION - ORIENTATION
ORIENTATION: MID

## 2022-03-08 ASSESSMENT — PAIN SCALES - GENERAL
PAINLEVEL_OUTOF10: 7
PAINLEVEL_OUTOF10: 7
PAINLEVEL_OUTOF10: 9
PAINLEVEL_OUTOF10: 8
PAINLEVEL_OUTOF10: 6
PAINLEVEL_OUTOF10: 9
PAINLEVEL_OUTOF10: 8
PAINLEVEL_OUTOF10: 7
PAINLEVEL_OUTOF10: 8
PAINLEVEL_OUTOF10: 9

## 2022-03-08 ASSESSMENT — PAIN DESCRIPTION - PROGRESSION
CLINICAL_PROGRESSION: NOT CHANGED

## 2022-03-08 ASSESSMENT — PAIN DESCRIPTION - FREQUENCY
FREQUENCY: CONTINUOUS

## 2022-03-08 ASSESSMENT — PAIN DESCRIPTION - DESCRIPTORS
DESCRIPTORS: ACHING;PRESSURE
DESCRIPTORS: ACHING;PRESSURE
DESCRIPTORS: ACHING;HEADACHE
DESCRIPTORS: ACHING;PRESSURE

## 2022-03-08 ASSESSMENT — PAIN DESCRIPTION - LOCATION
LOCATION: HEAD

## 2022-03-08 ASSESSMENT — PAIN DESCRIPTION - ONSET
ONSET: GRADUAL
ONSET: GRADUAL
ONSET: ON-GOING
ONSET: GRADUAL

## 2022-03-08 ASSESSMENT — PAIN - FUNCTIONAL ASSESSMENT: PAIN_FUNCTIONAL_ASSESSMENT: PREVENTS OR INTERFERES SOME ACTIVE ACTIVITIES AND ADLS

## 2022-03-08 NOTE — PROGRESS NOTES
OBS/CDU   RESIDENT NOTE      Patients PCP is:  Wilfredo Mendiola MD        SUBJECTIVE      No acute events overnight. Tolerating p.o., denies any nausea or vomiting. Denies any chest pain or shortness of breath. Headache is improved. Pain is under control. PHYSICAL EXAM      General: NAD, AO X 3  Heent: EMOI, PERRL  Neck: SUPPLE, NO JVD  Cardiovascular: RRR, S1S2  Pulmonary: CTAB, NO SOB  Abdomen: SOFT, NTTP, ND, +BS  Extremities: +2/4 PULSES DISTAL, NO SWELLING  Neuro / Psych: NO NUMBNESS OR TINGLING, MENTATION AT BASELINE    PERTINENT TEST /EXAMS      I have reviewed all available laboratory results.     MEDICATIONS CURRENT   penicillin v potassium (VEETID) tablet 500 mg, 4 times per day  nicotine (NICODERM CQ) 14 MG/24HR 1 patch, Daily  lidocaine 4 % external patch 1 patch, Daily  methylPREDNISolone (MEDROL) tablet 40 mg, Daily  oxyCODONE (ROXICODONE) immediate release tablet 2.5 mg, Q4H PRN  HYDROcodone-acetaminophen (NORCO) 5-325 MG per tablet 2 tablet, Q6H PRN  apixaban (ELIQUIS) tablet 5 mg, BID  butalbital-acetaminophen-caffeine (FIORICET, ESGIC) per tablet 1 tablet, Q4H PRN  ipratropium-albuterol (DUONEB) nebulizer solution 1 ampule, Q4H WA  lisinopril (PRINIVIL;ZESTRIL) tablet 10 mg, Daily  ondansetron (ZOFRAN-ODT) disintegrating tablet 4 mg, Q8H PRN  acetaminophen (TYLENOL) tablet 1,000 mg, Q6H PRN  amitriptyline (ELAVIL) tablet 10 mg, Nightly  atorvastatin (LIPITOR) tablet 40 mg, Nightly  clopidogrel (PLAVIX) tablet 75 mg, Daily  budesonide-formoterol (SYMBICORT) 80-4.5 MCG/ACT inhaler 2 puff, BID  sodium chloride flush 0.9 % injection 5-40 mL, 2 times per day  sodium chloride flush 0.9 % injection 5-40 mL, PRN  0.9 % sodium chloride infusion, PRN  potassium chloride (KLOR-CON M) extended release tablet 40 mEq, PRN   Or  potassium bicarb-citric acid (EFFER-K) effervescent tablet 40 mEq, PRN   Or  potassium chloride 10 mEq/100 mL IVPB (Peripheral Line), PRN  polyethylene glycol (GLYCOLAX) packet 17 g, Daily PRN  carvedilol (COREG) tablet 6.25 mg, BID WC        All medication charted and reviewed. CONSULTS      IP CONSULT TO IV TEAM  IP CONSULT TO SOCIAL WORK  IP CONSULT TO ONCOLOGY    ASSESSMENT/PLAN       Emi Daniels is a 74 y.o. female who presents with green and hip pain. Patient has a history of homelessness her medications were thrown out by family member. Patient was placed in the observation unit for medication refills as well as PT/OT evaluation for possible placement. Social work is involved. · Penicillin for dental infection  · Hematology oncology was consulted due to patient's history of pancreatic cancer, recommend following up outpatient with established oncologist at [facility]  · Patient accepted to [facility]  · Continue home medications and pain control  · Monitor vitals, labs, and imaging  · DISPO: pending consults and clinical improvement    --  Gaetano Ordoñez MD  Emergency Medicine Resident Physician     This dictation was generated by voice recognition computer software. Although all attempts are made to edit the dictation for accuracy, there may be errors in the transcription that are not intended.

## 2022-03-08 NOTE — PLAN OF CARE
Problem: Falls - Risk of:  Goal: Will remain free from falls  Description: Will remain free from falls  3/7/2022 2219 by Giselle Leal RN  Outcome: Ongoing  3/7/2022 1854 by Sandy Malave RN  Outcome: Ongoing  Goal: Absence of physical injury  Description: Absence of physical injury  3/7/2022 2219 by Giselle Leal RN  Outcome: Ongoing  3/7/2022 1854 by Sandy Malave RN  Outcome: Ongoing     Problem: Pain:  Goal: Pain level will decrease  Description: Pain level will decrease  3/7/2022 2219 by Giselle Leal RN  Outcome: Ongoing  3/7/2022 1854 by Sandy Malave RN  Outcome: Ongoing  Goal: Control of acute pain  Description: Control of acute pain  3/7/2022 2219 by Giselle Leal RN  Outcome: Ongoing  3/7/2022 1854 by Sandy Malave RN  Outcome: Ongoing  Goal: Control of chronic pain  Description: Control of chronic pain  3/7/2022 2219 by Giselle Leal RN  Outcome: Ongoing  3/7/2022 1854 by Sandy Malave RN  Outcome: Ongoing

## 2022-03-08 NOTE — PROGRESS NOTES
[facility]  CDU / OBSERVATION eNCOUnter  Attending NOte       I performed a history and physical examination of the patient and discussed management with the resident. I reviewed the residents note and agree with the documented findings and plan of care. Any areas of disagreement are noted on the chart. I was personally present for the key portions of any procedures. I have documented in the chart those procedures where I was not present during the key portions. I have reviewed the nurses notes. I agree with the chief complaint, past medical history, past surgical history, allergies, medications, social and family history as documented unless otherwise noted below. The Family history, social history, and ROS are effectively unchanged since admission unless noted elsewhere in the chart. 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 Follows with Dr Joanna Morales at [address]. Social barriers to care. Appreciate Oncology meeting with her. Plan for placement.     Elizabeth Diego MD  Attending Emergency  Physician

## 2022-03-08 NOTE — PROGRESS NOTES
_                         Today's Date: 3/8/2022  Patient Name: Miguel Laguna  Date of admission: 3/4/2022  4:46 PM  Patient's age: 74 y.o., 1955  Admission Dx: Migraine aura without headache [G43.109]  Social problem [Z65.9]  Other migraine without status migrainosus, not intractable [G43.809]  Unable to care for self [Z78.9]      Requesting Physician: Tayo Kurtz MD    CHIEF COMPLAINT: Headaches and hip pain. Consult for history of pancreatic cancer. History Obtained From:  patient, electronic medical record    Interim history:  No issues overnight. Ready for discharge to SNF. Transportation has been arranged. Vitals are stable. no other symptoms     HISTORY OF PRESENT ILLNESS:      The patient is a 74 y.o.  female who is admitted to the hospital for further management of migraine headaches with aura. Patient states that she has chronic migraines and she was on Fioricet treatment and recently she had no improvement on treatment so she presented in was subsequently admitted. She is feeling better now on Norco and Fioricet. She also had hip pain which is a chronic problem as well. Patient has no abdominal pain. No nausea or vomiting. The patient had history of tumor in the head of the pancreas. 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 The patient was seen at [facility] July 2021. She was prescribed treatment in the form of pills (which I believe is imatinib), but patient used it for 2 weeks and a family member threw the medicines away. She had no evaluation at the cancer center since the holidays. She had problem with transportation. Patient denies any GI bleeding. No change in the bowel movements.     Past Medical History:   has a past medical history of Appendicitis, Cerebral artery occlusion with cerebral infarction [facility]), CHF (congestive heart failure) ([address] Chronic respiratory failure with hypoxia and hypercapnia (HCC), Chronic rhinitis, Cigarette smoker, COPD (chronic obstructive pulmonary disease) ([facility] [address] Depression, Dysphagia, Essential hypertension, GERD (gastroesophageal reflux disease), Heart failure, diastolic, with acute decompensation ([address] Hepatitis C, chronic ([address] History of smoking at least 1 pack per day for at least 30 years, Hyperlipidemia, Hypertension, Migraine, Moderate COPD (chronic obstructive pulmonary disease) ([address] Multiple transfusions, Neurogenic dysphagia, Osteoarthritis, Pneumonia, Scoliosis, and Substance abuse ([address] Past Surgical History:   has a past surgical history that includes  section; LEEP (); Upper gastrointestinal endoscopy (); Gastric bypass surgery; Appendectomy (2017); laparoscopic appendectomy (N/A, 3/27/2017); Upper gastrointestinal endoscopy (2018); Upper gastrointestinal endoscopy (N/A, 2018); pr colonoscopy w/biopsy single/multiple (N/A, 11/10/2018); pr egd transoral biopsy single/multiple (N/A, 2018); hip surgery (Right, 2019); Total hip arthroplasty (Right, 2019); Medication Injection (Left, 3/25/2021); Upper gastrointestinal endoscopy (N/A, 2021); other surgical history (Left, 2021); Medication Injection (Left, 2021); and Medication Injection (Left, 2021). Family History: family history includes Breast Cancer in her mother; Heart Disease in her father. Social History:   reports that she has been smoking cigarettes. She started smoking about 52 years ago. She has a 20.00 pack-year smoking history. She has never used smokeless tobacco. She reports that she does not drink alcohol and does not use drugs. Medications:    Prior to Admission medications    Medication Sig Start Date End Date Taking? Authorizing Provider   HYDROcodone-acetaminophen (NORCO) 5-325 MG per tablet Take 2 tablets by mouth every 6 hours as needed for Pain for up to 5 days.  3/8/22 3/13/22 Yes Juana Brown MD   methylPREDNISolone (MEDROL) 8 MG tablet Take 5 tablets by mouth daily for 2 doses 3/9/22 3/11/22 Yes Gaetano Ordoñez MD   lidocaine 4 % external patch Place 1 patch onto the skin daily 3/9/22  Yes Gaetano Ordoñez MD   penicillin v potassium (VEETID) 500 MG tablet Take 1 tablet by mouth 4 times daily for 10 days 3/8/22 3/18/22 Yes Gaetano Ordoñez MD   ondansetron (ZOFRAN-ODT) 8 MG TBDP disintegrating tablet  9/23/21  Yes Historical Provider, MD   gabapentin (NEURONTIN) 300 MG capsule  8/31/21  Yes Historical Provider, MD   lisinopril (PRINIVIL;ZESTRIL) 10 MG tablet Take 1 tablet by mouth daily 4/6/21  Yes Estrada Segal DO   carvedilol (COREG) 6.25 MG tablet Take 1 tablet by mouth 2 times daily (with meals) 4/6/21  Yes Estrada Segal DO   atorvastatin (LIPITOR) 40 MG tablet Take 1 tablet by mouth nightly 4/6/21  Yes Estrada Segal DO   apixaban (ELIQUIS) 5 MG TABS tablet Take 1 tablet by mouth 2 times daily 4/6/21  Yes Estrada Segal DO   amitriptyline (ELAVIL) 10 MG tablet Take 1 tablet by mouth nightly 4/6/21  Yes Estrada Segal DO   vitamin B-1 100 MG tablet Take 1 tablet by mouth daily 10/13/18  Yes Lupe Glass MD   vitamin B-12 250 MCG tablet Take 1 tablet by mouth daily 10/13/18  Yes Lupe Glass MD   gabapentin (NEURONTIN) 300 MG capsule Take 1 capsule by mouth 3 times daily for 30 days.  Intended supply: 90 days 9/27/21 10/27/21  Carlos Christianson APRN - CNP   mirtazapine (REMERON) 30 MG tablet  9/6/21   Historical Provider, MD   [address] 4-1 % [facility] AND [facility]  9/13/21   Historical Provider, MD   imatinib (GLEEVEC) 400 MG chemo tablet  9/14/21   Historical Provider, MD   famotidine (PEPCID) 20 MG tablet  8/24/21   Historical Provider, MD   butalbital-acetaminophen-caffeine (FIORICET, ESGIC) -40 MG per tablet Take 1 tablet by mouth every 12 hours for 14 days 5/13/21 9/27/21  ISIDORO Love MD   ferrous sulfate (FE TABS 325) 325 (65 Fe) MG EC tablet Take 1 tablet by mouth daily 5/14/21   Richi Bone MD amitriptyline (ELAVIL) 10 MG tablet Take 1 tablet by mouth nightly for 14 days 5/13/21 9/27/21  ISIDORO Yeboah MD   atorvastatin (LIPITOR) 40 MG tablet Take 1 tablet by mouth daily for 14 days 5/13/21 9/27/21  ISIDORO Yeboah MD   acetaminophen (TYLENOL) 500 MG tablet Take 2 tablets by mouth every 6 hours as needed for Pain 5/7/21   Tara Leung, DO   mometasone-formoterol (DULERA) 100-5 MCG/ACT inhaler Inhale 2 puffs into the lungs 2 times daily 4/6/21   Emily Verma DO   clopidogrel (PLAVIX) 75 MG tablet Take 1 tablet by mouth daily 4/6/21   Emily Verma, DO   albuterol sulfate HFA (PROVENTIL HFA) 108 (90 Base) MCG/ACT inhaler Inhale 1-2 puffs into the lungs once for 1 dose 4/6/21 9/27/21  Emily Verma DO   amLODIPine (NORVASC) 10 MG tablet Take 1 tablet by mouth daily 2/9/21 2/9/21  Berny Gandara MD   Misc.  Devices [facility]) MISC 1 Device by Does not apply route once for 1 dose 8/5/19 6/6/21  Leah Escalona PA-C   ipratropium-albuterol (DUONEB) 0.5-2.5 (3) MG/3ML SOLN nebulizer solution Inhale 3 mLs into the lungs every 4 hours as needed for Shortness of Breath 1/11/19   Prashant Cuellar MD   Multiple Vitamins-Minerals (THERAPEUTIC MULTIVITAMIN-MINERALS) tablet Take 1 tablet by mouth daily 8/12/18   Zainab Thurman MD     Current Facility-Administered Medications   Medication Dose Route Frequency Provider Last Rate Last Admin    penicillin v potassium (VEETID) tablet 500 mg  500 mg Oral 4 times per day Jim Harris MD   500 mg at 03/08/22 0801    nicotine (NICODERM CQ) 14 MG/24HR 1 patch  1 patch TransDERmal Daily Bran Turner MD   1 patch at 03/07/22 2153    lidocaine 4 % external patch 1 patch  1 patch TransDERmal Daily Jim Harris MD   1 patch at 03/08/22 0916    methylPREDNISolone (MEDROL) tablet 40 mg  40 mg Oral Daily Jim Harris MD   40 mg at 03/08/22 0914    oxyCODONE (ROXICODONE) immediate release tablet 2.5 mg  2.5 mg Oral Q4H PRN Jim Harris MD        HYDROcodone-acetaminophen [facility] AND [facility] 5-325 MG per tablet 2 tablet  2 tablet Oral Q6H PRN Bailey Velasquez MD   2 tablet at 03/08/22 1158    apixaban (ELIQUIS) tablet 5 mg  5 mg Oral BID Rain Grey, DO   5 mg at 03/08/22 1416    butalbital-acetaminophen-caffeine (FIORICET, ESGIC) per tablet 1 tablet  1 tablet Oral Q4H PRN Bailey Velasquez MD   1 tablet at 03/08/22 0603    ipratropium-albuterol (DUONEB) nebulizer solution 1 ampule  1 ampule Inhalation Q4H WA Juana Brwon MD   1 ampule at 03/08/22 1109    lisinopril (PRINIVIL;ZESTRIL) tablet 10 mg  10 mg Oral Daily Jorge Velez MD   10 mg at 03/08/22 0914    ondansetron (ZOFRAN-ODT) disintegrating tablet 4 mg  4 mg Oral Q8H PRN Jorge Velez MD   4 mg at 03/05/22 2109    acetaminophen (TYLENOL) tablet 1,000 mg  1,000 mg Oral Q6H PRN Rain Grey, DO   1,000 mg at 03/05/22 9585    amitriptyline (ELAVIL) tablet 10 mg  10 mg Oral Nightly Rain Grey, DO   10 mg at 03/06/22 2029    atorvastatin (LIPITOR) tablet 40 mg  40 mg Oral Nightly Rain Acevedos, DO   40 mg at 03/06/22 2029    clopidogrel (PLAVIX) tablet 75 mg  75 mg Oral Daily Rain Grey, DO   75 mg at 03/08/22 8407    budesonide-formoterol (SYMBICORT) 80-4.5 MCG/ACT inhaler 2 puff  2 puff Inhalation BID Rain Grey, DO   2 puff at 03/08/22 7329    sodium chloride flush 0.9 % injection 5-40 mL  5-40 mL IntraVENous 2 times per day Rain Grey, DO   10 mL at 03/05/22 7370    sodium chloride flush 0.9 % injection 5-40 mL  5-40 mL IntraVENous PRN Rain Grey, DO        0.9 % sodium chloride infusion  25 mL IntraVENous PRN Charlynn Matilda, DO        potassium chloride (KLOR-CON M) extended release tablet 40 mEq  40 mEq Oral PRN Charlynn Matilda, DO        Or    potassium bicarb-citric acid (EFFER-K) effervescent tablet 40 mEq  40 mEq Oral PRN Charlynn Matilda, DO        Or    potassium chloride 10 mEq/100 mL IVPB (Peripheral Line)  10 mEq IntraVENous PRN Charlynn Matilda, DO        polyethylene glycol (GLYCOLAX) packet 17 g  17 g Oral Daily PRN Zaira Balderrama DO        carvedilol (COREG) tablet 6.25 mg  6.25 mg Oral BID  Shawn Mills MD   6.25 mg at 22 5758       Allergies:  Aspirin and Keflet [cephalexin]    REVIEW OF SYSTEMS:      · General: Positive for weakness and fatigue. Positive for weight loss and decreased appetite. No fever or chills. · Eyes: No blurred vision, eye pain or double vision. · Ears: No hearing problems or drainage. No tinnitus. · Throat: No sore throat, problems with swallowing or dysphagia. · Respiratory: No cough, sputum or hemoptysis. No shortness of breath. No pleuritic chest pain. · Cardiovascular: No chest pain, orthopnea or PND. No lower extremity edema. No palpitation. · Gastrointestinal: As above. · Genitourinary: No dysuria, hematuria, frequency or urgency. · Musculoskeletal: Hips pain. .  · Dermatologic: No skin rashes or pruritus. No skin lesions or discolorations. · Psychiatric: No depression, anxiety, or stress or signs of schizophrenia. No change in mood or affect. · Hematologic: No history of bleeding tendency. No bruises or ecchymosis. No history of clotting problems. · Infectious disease: No fever, chills or frequent infections. · Endocrine: No polydipsia or polyuria. No temperature intolerance. · Neurologic: Positive for migraine headaches no dizziness. No weakness or numbness of the extremities. No changes in balance, coordination,  memory, mentation, behavior. · Allergic/Immunologic: No nasal congestion or hives. No repeated infections. PHYSICAL EXAM:      BP (!) 160/118   Pulse 86   Temp 97.7 °F (36.5 °C) (Oral)   Resp 19   Ht 4' 11\" (1.499 m)   Wt 85 lb (38.6 kg)   LMP 2015   SpO2 98%   BMI 17.17 kg/m²    Temp (24hrs), Av.8 °F (36.6 °C), Min:97.7 °F (36.5 °C), Max:98.1 °F (36.7 °C)      General appearance - not in pain or distress. Looks chronically ill.   Mental status - alert and oriented  Eyes - pupils equal and reactive, extraocular eye movements intact  Ears - bilateral TM's and external ear canals normal  Nose - normal and patent, no erythema, discharge or polyps  Mouth - mucous membranes moist, pharynx normal without lesions  Neck - supple, no significant adenopathy  Lymphatics - no palpable lymphadenopathy, no hepatosplenomegaly  Chest - clear to auscultation, no wheezes, rales or rhonchi, symmetric air entry  Heart - normal rate, regular rhythm, normal S1, S2, no murmurs, rubs, clicks or gallops  Abdomen - soft, mild tenderness. , nondistended, no masses or organomegaly  Neurological - alert, oriented, normal speech, no focal findings or movement disorder noted  Musculoskeletal - no joint tenderness, deformity or swelling  Extremities - peripheral pulses normal, no pedal edema, no clubbing or cyanosis  Skin - normal coloration and turgor, no rashes, no suspicious skin lesions noted           DATA:      Labs:       CBC:   Recent Labs     03/06/22  0901   WBC 5.7   HGB 16.1*   HCT 49.3*        BMP:   Recent Labs     03/06/22  0901      K 4.0   CO2 21   BUN 15   CREATININE 0.74   LABGLOM >60   GLUCOSE 133*     PT/INR: No results for input(s): PROTIME, INR in the last 72 hours. APTT:No results for input(s): APTT in the last 72 hours. LIVER PROFILE:No results for input(s): AST, ALT, LABALBU in the last 72 hours. XR CHEST PORTABLE  Narrative: EXAMINATION:  ONE XRAY VIEW OF THE CHEST    3/4/2022 5:47 pm    COMPARISON:  5 June 2021    HISTORY:  ORDERING SYSTEM PROVIDED HISTORY: SOB  TECHNOLOGIST PROVIDED HISTORY:  SOB    FINDINGS:  AP view of the chest time stamped at 1743 hours submitted. Heart size is  normal.  No vascular congestion, focal consolidation, effusion, or  pneumothorax is noted. Osseous and mediastinal structures are  age-appropriate. Lung fields are mildly hyperinflated. Impression: No acute cardiopulmonary process. Mild hyperinflation of the lung fields.     IMPRESSION: Primary Problem  Migraine aura without headache    Active Hospital Problems    Diagnosis Date Noted    Unable to care for self [Z78.9] 03/08/2022    Social problem [Z65.9]     Soft tissue tumor, malignant ([facility] [address] [C49.9]     Migraine aura without headache [G43.109] 03/04/2022   GIST of the head of the pancreas    RECOMMENDATIONS:  1. Records and labs and images were reviewed and discussed with the patient. 2. 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 Diagnosis was April 2021. She was established with oncologist in [facility], Dr. Chriss Mcclain, and was started on treatment likely imatinib. Patient used 2 weeks of treatment and family decided to throw the medicine away as per the patient. She has problem with transportation. 3. No need for active inpatient intervention. 4. Patient will need  to arrange for transportation and follow-up with her oncologist at [facility]. 5. Thank you for allowing us to participate in the care of this pleasant patient. 6. Ok to discharge from our standpoint. 7. We will sign off. Plan to be discuss with rounding attending        Tayo Douglas MD.  Internal Medicine Resident PGY [facility]   3/8/2022, 1:00 PM        Attending Physician Statement   I have discussed the care of Emi Daniels, including pertinent history and exam findings with the resident. I have reviewed the key elements of all parts of the encounter with the resident. I have seen and examined the patient with the resident. I agree with the assessment and plan and status of the problem list as documented.                                [facility]

## 2022-03-08 NOTE — CARE COORDINATION
Spoke with Ryan Mukherjee at [facility], still awaiting precert    [id number] received call from Ryan Mukherjee at [facility], who relates precert obtained. Transportation arranged for 4pm pickup per Loney Duverney at [facility] Bernadette at [facility] updated, ANTELMO Franklin updated, will given number for report.  Patient updated, agreeable    Discharge [facility] Case Management Department  Written by: Ck Gracia RN    Patient Name: Emilia Pimentel  Attending Provider: Taina Davenport MD  Admit Date: 3/4/2022  4:46 PM  MRN: [id number]  Account: [de-identified]                     : 1955  Discharge Date:  3/8/2022        Disposition: [facility] Jose Foley RN

## 2022-03-08 NOTE — PROGRESS NOTES
Physical Therapy  Facility/Department: [address] MED SURG  Daily Treatment Note  NAME: Emi Daniels  : 1955  MRN: [id number]    Date of Service: 3/8/2022    Discharge Recommendations:  Patient would benefit from continued therapy after discharge   PT Equipment Recommendations  Equipment Needed: Yes  Mobility Devices: Carla Ok: Rolling  Other: Her listed height is 4' 11''    Assessment   Body structures, Functions, Activity limitations: Decreased functional mobility ; Decreased strength;Decreased balance;Decreased endurance  Assessment: Pt able to complete bed mobility with SBA, ambulated 12ft x2 with CGA and use of RW. Pt demonstrated decreased endurance and c/o L hip pain with ambulation, grossly steady for short distances with RW. Pt would benefit from continued PT to address deficits, would require 24 hr assistance secondary to endurance deficits and generalized weakness  Prognosis: Good  PT Education: Goals; General Safety;Gait Training;PT Role;Transfer Training;Precautions; Functional Mobility Training;Home Exercise Program;Energy Conservation  REQUIRES PT FOLLOW UP: Yes  Activity Tolerance  Activity Tolerance: Patient limited by endurance; Patient limited by pain     Patient Diagnosis(es): The primary encounter diagnosis was Other migraine without status migrainosus, not intractable. Diagnoses of Social problem and Hip arthritis were also pertinent to this visit.      has a past medical history of Appendicitis, Cerebral artery occlusion with cerebral infarction ([address] CHF (congestive heart failure) ([address] Chronic respiratory failure with hypoxia and hypercapnia (HCC), Chronic rhinitis, Cigarette smoker, COPD (chronic obstructive pulmonary disease) ([address] Depression, Dysphagia, Essential hypertension, GERD (gastroesophageal reflux disease), Heart failure, diastolic, with acute decompensation ([address] Hepatitis C, chronic ([address] History of smoking at least 1 pack per day for at least 30 years, Hyperlipidemia, Hypertension, Migraine, Moderate COPD (chronic obstructive pulmonary disease) (HCC), Multiple transfusions, Neurogenic dysphagia, Osteoarthritis, Pneumonia, Scoliosis, and Substance abuse ([facility] has a past surgical history that includes  section; LEEP (); Upper gastrointestinal endoscopy (); Gastric bypass surgery; Appendectomy (2017); laparoscopic appendectomy (N/A, 3/27/2017); Upper gastrointestinal endoscopy (2018); Upper gastrointestinal endoscopy (N/A, 2018); pr colonoscopy w/biopsy single/multiple (N/A, 11/10/2018); pr egd transoral biopsy single/multiple (N/A, 2018); hip surgery (Right, 2019); Total hip arthroplasty (Right, 2019); Medication Injection (Left, 3/25/2021); Upper gastrointestinal endoscopy (N/A, 2021); other surgical history (Left, 2021); Medication Injection (Left, 2021); and Medication Injection (Left, 2021). Restrictions  Restrictions/Precautions  Restrictions/Precautions: Up as Tolerated  Subjective   General  Response To Previous Treatment: Patient reporting fatigue but able to participate. Family / Caregiver Present: No  Subjective  Subjective: Pt and RN agreeable to PT. Pt alert in bed upon arrival, c/o headache and fatigue. Agreeable to ambulate to restroom  General Comment  Comments: Pt left in bed with call light within reach  Pain Screening  Patient Currently in Pain: Yes  Pain Assessment  Pain Assessment: 0-10  Pain Level: 8  Pain Type: Acute pain  Pain Location: Head  Pain Descriptors: Aching;Headache  Pain Frequency: Continuous  Pain Onset: On-going  Clinical Progression: Not changed  Functional Pain Assessment: Prevents or interferes some active activities and ADLs  Non-Pharmaceutical Pain Intervention(s): Ambulation/Increased Activity; Distraction;Repositioned  Vital Signs  Patient Currently in Pain: Yes       Orientation  Orientation  Overall Orientation Status: Within Functional Limits  Cognition      Objective   Bed mobility  Supine to Sit: Supervision  Sit to Supine: Supervision  Scooting: Supervision  Comment: HOB flat  Transfers  Sit to Stand: Stand by assistance  Stand to sit: Stand by assistance  Comment: vc's for UE placement and sequencing with good return demo  Ambulation  Ambulation?: Yes  Ambulation 1  Surface: level tile  Device: Rolling Walker  Assistance: Contact guard assistance  Quality of Gait: slow, cautious gait. Slightly flexed posture  Distance: 12ft x2  Comments: pt declined further amb, states \"my head hurts too bad\"  Stairs/Curb  Stairs?: No     Balance  Posture: Fair  Sitting - Static: Good  Sitting - Dynamic: Good;-  Standing - Static: Fair;+  Standing - Dynamic: Fair  Comments: standing balance assessed with RW  Exercises  Ankle Pumps: 20x bilat seated EOB  Comments: pt declined further ther exs secondary to c/o HA     AM-PAC Score  AM-PAC Inpatient Mobility Raw Score : 19 (03/08/22 1339)  AM-PAC Inpatient T-Scale Score : 45.44 (03/08/22 1339)  Mobility Inpatient CMS 0-100% Score: 41.77 (03/08/22 1339)  Mobility Inpatient CMS G-Code Modifier : CK (03/08/22 1339)    Goals  Short term goals  Time Frame for Short term goals: 14 visits  Short term goal 1: Sit to/from stand with independence. Short term goal 2: Ambulate 100' with walker with SBA. Short term goal 3: Improve standing balance to fair dynamic. Plan    Plan  Times per week: 5-6x/wk  Times per day: Daily  Current Treatment Recommendations: Strengthening,Home Exercise Program,Safety Education & Training,Endurance Training,Functional Mobility Training,Transfer Training,Gait Training,Equipment Evaluation, Education, & procurement,Patient/Caregiver Education & Training  Safety Devices  Type of devices:  All fall risk precautions in place,Call light within reach,Gait belt,Left in bed,Nurse notified  Restraints  Initially in place: No     Therapy Time   Individual Concurrent Group Co-treatment   Time In 1126 Time Out 1136         Minutes 10         Timed Code Treatment Minutes: Bee [place]

## 2022-03-08 NOTE — PROGRESS NOTES
Occupational Therapy  Facility/Department: [address] MED SURG  Daily Treatment Note  NAME: Emi Daniels  : 1955  MRN: [id number]    Date of Service: 3/8/2022    Discharge Recommendations:  Patient would benefit from continued therapy after discharge in order to increase pt safety and independence       Assessment   Performance deficits / Impairments: Decreased functional mobility ; Decreased ADL status; Decreased endurance;Decreased high-level IADLs;Decreased safe awareness;Decreased balance;Decreased cognition  Prognosis: Good  OT Education: OT Role;Transfer Training;Precautions; ADL Adaptive Strategies  Patient Education: proper hand and foot placement; balance maintaince; safety precautions; adaptive dressing  Barriers to Learning: pt demo F carry over  REQUIRES OT FOLLOW UP: Yes  Activity Tolerance  Activity Tolerance: Treatment limited secondary to agitation  Safety Devices  Safety Devices in place: Yes  Type of devices: Gait belt;Patient at risk for falls; Left in bed;Call light within reach; Bed alarm in place;Nurse notified  Restraints  Initially in place: No         Patient Diagnosis(es): The primary encounter diagnosis was Other migraine without status migrainosus, not intractable. Diagnoses of Social problem and Hip arthritis were also pertinent to this visit.       has a past medical history of Appendicitis, Cerebral artery occlusion with cerebral infarction ([address] CHF (congestive heart failure) ([address] Chronic respiratory failure with hypoxia and hypercapnia (HCC), Chronic rhinitis, Cigarette smoker, COPD (chronic obstructive pulmonary disease) ([address] Depression, Dysphagia, Essential hypertension, GERD (gastroesophageal reflux disease), Heart failure, diastolic, with acute decompensation ([address] Hepatitis C, chronic ([address] History of smoking at least 1 pack per day for at least 30 years, Hyperlipidemia, Hypertension, Migraine, Moderate COPD (chronic obstructive pulmonary disease) ([address] Multiple transfusions, Neurogenic dysphagia, Osteoarthritis, Pneumonia, Scoliosis, and Substance abuse ([facility] has a past surgical history that includes  section; LEEP (); Upper gastrointestinal endoscopy (); Gastric bypass surgery; Appendectomy (2017); laparoscopic appendectomy (N/A, 3/27/2017); Upper gastrointestinal endoscopy (2018); Upper gastrointestinal endoscopy (N/A, 2018); pr colonoscopy w/biopsy single/multiple (N/A, 11/10/2018); pr egd transoral biopsy single/multiple (N/A, 2018); hip surgery (Right, 2019); Total hip arthroplasty (Right, 2019); Medication Injection (Left, 3/25/2021); Upper gastrointestinal endoscopy (N/A, 2021); other surgical history (Left, 2021); Medication Injection (Left, 2021); and Medication Injection (Left, 2021). Restrictions  Restrictions/Precautions  Restrictions/Precautions: Up as Tolerated  Subjective   General  Chart Reviewed: Yes  Patient assessed for rehabilitation services?: Yes  Family / Caregiver Present: No  General Comment  Comments: Pt and RN agreeable to therapy  Pain Assessment  Pain Assessment: 0-10  Pain Level: 7  Pain Type: Chronic pain  Pain Location: Head  Non-Pharmaceutical Pain Intervention(s): Rest;Shower  Pre Treatment Pain Screening  Comments / Details: Pt supine in bed at start of session req mod encouragement for therapy, agitated throughout. At session end pt retired to supine in bed with bed alarm on, call light in reach and RN notified.   Vital Signs  Patient Currently in Pain: Yes   Orientation  Orientation  Overall Orientation Status: Within Functional Limits  Objective    ADL  UE Bathing: Supervision;Setup (seated in shower)  LE Bathing: Stand by assistance;Setup (seated)  UE Dressing: Supervision;Setup (to doff/don gown)  LE Dressing: Supervision;Minimal assistance;Setup (to doff/don socks, underwear and pants)  Additional Comments: Bathing/dressing facilitated in order to increase pt balance, strength and independence. SUP for UB bathing seated and SUP to doff/don socks. SBA for LB bathing seated in shower, pt demo questionable hygiene awareness. SUP for LB dressing to doff socks seated demo F hip flexion. Min A for LB dressing to doff/don underwear and pants pt able to doff with CGA req Min A for threading. Throughout session pt limited per pain and agitation. Balance  Sitting Balance: Supervision (Pt tolerated approx 15 min seated static/dynamic during ADLs unsupported)  Standing Balance: Stand by assistance  Standing Balance  Time: approx 4-5 min  Activity: static/dynamic standing during ADLs  Comment: with and without RW  Functional Mobility  Functional - Mobility Device: Rolling Walker  Activity: To/from bathroom  Assist Level: Contact guard assistance  Functional Mobility Comments: SBA EOB>shower with RW and CGA shower>EOB without AD pt demo decreased safety awareness, impulsive  Toilet Transfers  Toilet - Technique: Ambulating  Equipment Used: Standard toilet  Toilet Transfer: Contact guard assistance  Toilet Transfers Comments: without AD  Tub Transfers  Tub - Transfer From: [employer] - Transfer From: [employer] - Transfer Type: To and From  Shower - Transfer To:  Shower seat with back  Shower - Technique: Ambulating  Shower Transfers: Contact Guard  Shower Transfers Comments: with and without RW  Bed mobility  Supine to Sit: Supervision  Sit to Supine: Supervision  Scooting: Supervision  Comment: HOB elevated, utilizing bed rails  Transfers  Sit to stand: Stand by assistance  Stand to sit: Stand by assistance  Transfer Comments: utilizing RW                       Cognition  Overall Cognitive Status: Exceptions  Safety Judgement: Decreased awareness of need for assistance;Decreased awareness of need for safety  Problem Solving: Decreased awareness of errors  Insights: Decreased awareness of deficits  Cognition Comment: impulsive Plan   Plan  Times per week: 3-4x/wk  Current Treatment Recommendations: Strengthening,Endurance Training,Patient/Caregiver Education & Training,Equipment Evaluation, Education, & procurement,Self-Care / ADL,Home Management Training,Safety Education & Training,Functional Mobility Training,Balance Training,Positioning                                               AM-PAC Score        AM-PAC Inpatient Daily Activity Raw Score: 20 (03/08/22 1158)  AM-PAC Inpatient ADL T-Scale Score : 42.03 (03/08/22 1158)  ADL Inpatient CMS 0-100% Score: 38.32 (03/08/22 1158)  ADL Inpatient CMS G-Code Modifier : Fabian Noland (03/08/22 1158)    Goals  Short term goals  Time Frame for Short term goals: Patient will, by discharge  Short term goal 1: demo ADLs independently  Short term goal 2: demo functional transfers/mobility using LRD at Supervision to engage in ADLs  Short term goal 3: demo proper placement of RW during funcitonal tasks independently  Short term goal 4: demo 10+ min of dynamic standing tolerance at Supervision to engage in ADLs safely  Short term goal 5: demo good safety awareness during functional tasks with 0 VCs       Therapy Time   Individual Concurrent Group Co-treatment   Time In 0849         Time Out 0912         Minutes 23         Timed Code Treatment Minutes: MEETA Jimenez/AASHISH

## 2022-03-08 NOTE — DISCHARGE INSTR - DIET

## 2022-03-09 NOTE — DISCHARGE SUMMARY
CDU Discharge Summary        Patient:  Zeyad Angeles  YOB: 1955    MRN: [id number]   Acct: [de-identified]    Primary Care Physician: Tejal Lugo MD    Admit date:  3/4/2022  4:46 PM  Discharge date: 3/8/2022  4:12 PM     Discharge Diagnoses:     Acute weakness due to medication loss and inability to care for self  Improved with rest, medication, placement    Follow-up:  Call today/tomorrow for a follow up appointment with Tejal Lugo MD , or return to the Emergency Room with worsening symptoms    Stressed to patient the importance of following up with primary care doctor for further workup/management of symptoms. Pt verbalizes understanding and agrees with plan. Discharge Medications:  Changes to medications            Medication List      START taking these medications    HYDROcodone-acetaminophen 5-325 MG per tablet  Commonly known as: NORCO  Take 2 tablets by mouth every 6 hours as needed for Pain for up to 5 days.      lidocaine 4 % external patch  Place 1 patch onto the skin daily     methylPREDNISolone 8 MG tablet  Commonly known as: MEDROL  Take 5 tablets by mouth daily for 2 doses     penicillin v potassium 500 MG tablet  Commonly known as: VEETID  Take 1 tablet by mouth 4 times daily for 10 days        CONTINUE taking these medications    acetaminophen 500 MG tablet  Commonly known as: TYLENOL  Take 2 tablets by mouth every 6 hours as needed for Pain     albuterol sulfate  (90 Base) MCG/ACT inhaler  Commonly known as: Proventil HFA  Inhale 1-2 puffs into the lungs once for 1 dose     * amitriptyline 10 MG tablet  Commonly known as: ELAVIL  Take 1 tablet by mouth nightly     * amitriptyline 10 MG tablet  Commonly known as: ELAVIL  Take 1 tablet by mouth nightly for 14 days     apixaban 5 MG Tabs tablet  Commonly known as: ELIQUIS  Take 1 tablet by mouth 2 times daily     * atorvastatin 40 MG tablet  Commonly known as: LIPITOR  Take 1 tablet by mouth nightly     * atorvastatin 40 MG tablet  Commonly known as: Lipitor  Take 1 tablet by mouth daily for 14 days     butalbital-acetaminophen-caffeine -40 MG per tablet  Commonly known as: FIORICET, ESGIC  Take 1 tablet by mouth every 12 hours for 14 days     carvedilol 6.25 MG tablet  Commonly known as: COREG  Take 1 tablet by mouth 2 times daily (with meals)     clopidogrel 75 MG tablet  Commonly known as: PLAVIX  Take 1 tablet by mouth daily     cyanocobalamin 250 MCG tablet  Take 1 tablet by mouth daily     famotidine 20 MG tablet  Commonly known as: PEPCID     ferrous sulfate 325 (65 Fe) MG EC tablet  Commonly known as: FE TABS 325  Take 1 tablet by mouth daily     * gabapentin 300 MG capsule  Commonly known as: NEURONTIN     * gabapentin 300 MG capsule  Commonly known as: NEURONTIN  Take 1 capsule by mouth 3 times daily for 30 days. Intended supply: 90 days     Icy Hot Lidocaine Plus Menthol 4-1 % Ptch  Generic drug: Lidocaine-Menthol     imatinib 400 MG chemo tablet  Commonly known as: GLEEVEC     ipratropium-albuterol 0.5-2.5 (3) MG/3ML Soln nebulizer solution  Commonly known as: DUONEB  Inhale 3 mLs into the lungs every 4 hours as needed for Shortness of Breath     lisinopril 10 MG tablet  Commonly known as: PRINIVIL;ZESTRIL  Take 1 tablet by mouth daily     mirtazapine 30 MG tablet  Commonly known as: REMERON     mometasone-formoterol 100-5 MCG/ACT inhaler  Commonly known as: DULERA  Inhale 2 puffs into the lungs 2 times daily     ondansetron 8 MG Tbdp disintegrating tablet  Commonly known as: ZOFRAN-ODT     therapeutic multivitamin-minerals tablet  Take 1 tablet by mouth daily     thiamine 100 MG tablet  Take 1 tablet by mouth daily     Wheelchair Misc  1 Device by Does not apply route once for 1 dose         * This list has 6 medication(s) that are the same as other medications prescribed for you. Read the directions carefully, and ask your doctor or other care provider to review them with you.                Where to Get Your Medications      You can get these medications from any pharmacy    Bring a paper prescription for each of these medications  · HYDROcodone-acetaminophen 5-325 MG per tablet  · lidocaine 4 % external patch  · methylPREDNISolone 8 MG tablet  · penicillin v potassium 500 MG tablet         Diet:  No diet orders on file , Advance as tolerated     Activity:  As tolerated    Consultants: IP CONSULT TO IV TEAM  IP CONSULT TO SOCIAL WORK  IP CONSULT TO ONCOLOGY    Procedures:  Not indicated     Diagnostic Test:   Results for orders placed or performed during the hospital encounter of 03/04/22   COVID-19, Rapid    Specimen: Nasopharyngeal Swab   Result Value Ref Range    Specimen Description . NASOPHARYNGEAL SWAB     SARS-CoV-2, Rapid Not Detected Not Detected   CBC with Auto Differential   Result Value Ref Range    WBC 4.8 3.5 - 11.3 k/uL    RBC 5.28 (H) 3.95 - 5.11 m/uL    Hemoglobin 17.2 (H) 11.9 - 15.1 g/dL    Hematocrit 53.3 (H) 36.3 - 47.1 %    .9 82.6 - 102.9 fL    MCH 32.6 25.2 - 33.5 pg    MCHC 32.3 28.4 - 34.8 g/dL    RDW 13.2 11.8 - 14.4 %    Platelets 964 040 - 076 k/uL    MPV 9.5 8.1 - 13.5 fL    NRBC Automated 0.0 0.0 per 100 WBC    Seg Neutrophils 51 36 - 65 %    Lymphocytes 38 24 - 43 %    Monocytes 8 3 - 12 %    Eosinophils % 2 1 - 4 %    Basophils 1 0 - 2 %    Immature Granulocytes 0 0 %    Segs Absolute 2.48 1.50 - 8.10 k/uL    Absolute Lymph # 1.82 1.10 - 3.70 k/uL    Absolute Mono # 0.37 0.10 - 1.20 k/uL    Absolute Eos # 0.11 0.00 - 0.44 k/uL    Basophils Absolute 0.03 0.00 - 0.20 k/uL    Absolute Immature Granulocyte <0.03 0.00 - 0.30 k/uL   Basic Metabolic Panel w/ Reflex to MG   Result Value Ref Range    Glucose 83 70 - 99 mg/dL    BUN 17 8 - 23 mg/dL    CREATININE 0.80 0.50 - 0.90 mg/dL    Calcium 10.3 8.6 - 10.4 mg/dL    Sodium 142 135 - 144 mmol/L    Potassium 3.9 3.7 - 5.3 mmol/L    Chloride 103 98 - 107 mmol/L    CO2 21 20 - 31 mmol/L    Anion Gap 18 (H) 9 - 17 mmol/L    GFR Non-African American >60 >60 mL/min    GFR African American >60 >60 mL/min    GFR Comment         Troponin   Result Value Ref Range    Troponin, High Sensitivity 12 0 - 14 ng/L   Brain Natriuretic Peptide   Result Value Ref Range    Pro- (H) <300 pg/mL   Lipase   Result Value Ref Range    Lipase 73 (H) 13 - 60 U/L   Hepatic Function Panel   Result Value Ref Range    Albumin 5.2 3.5 - 5.2 g/dL    Alkaline Phosphatase 128 (H) 35 - 104 U/L    ALT 30 5 - 33 U/L    AST 54 (H) <32 U/L    Total Bilirubin 0.62 0.3 - 1.2 mg/dL    Bilirubin, Direct 0.27 <0.31 mg/dL    Bilirubin, Indirect 0.35 0.00 - 1.00 mg/dL    Total Protein 10.1 (H) 6.4 - 8.3 g/dL    Albumin/Globulin Ratio 1.1 1.0 - 2.5   CBC with Auto Differential   Result Value Ref Range    WBC 5.7 3.5 - 11.3 k/uL    RBC 4.89 3.95 - 5.11 m/uL    Hemoglobin 16.1 (H) 11.9 - 15.1 g/dL    Hematocrit 49.3 (H) 36.3 - 47.1 %    .8 82.6 - 102.9 fL    MCH 32.9 25.2 - 33.5 pg    MCHC 32.7 28.4 - 34.8 g/dL    RDW 13.2 11.8 - 14.4 %    Platelets 072 297 - 464 k/uL    MPV 9.6 8.1 - 13.5 fL    NRBC Automated 0.0 0.0 per 100 WBC    Seg Neutrophils 50 36 - 65 %    Lymphocytes 40 24 - 43 %    Monocytes 7 3 - 12 %    Eosinophils % 1 1 - 4 %    Basophils 1 0 - 2 %    Immature Granulocytes 1 (H) 0 %    Segs Absolute 2.87 1.50 - 8.10 k/uL    Absolute Lymph # 2.29 1.10 - 3.70 k/uL    Absolute Mono # 0.39 0.10 - 1.20 k/uL    Absolute Eos # 0.07 0.00 - 0.44 k/uL    Basophils Absolute 0.04 0.00 - 0.20 k/uL    Absolute Immature Granulocyte 0.05 0.00 - 0.30 k/uL   Basic Metabolic Panel   Result Value Ref Range    Glucose 133 (H) 70 - 99 mg/dL    BUN 15 8 - 23 mg/dL    CREATININE 0.74 0.50 - 0.90 mg/dL    Calcium 9.1 8.6 - 10.4 mg/dL    Sodium 143 135 - 144 mmol/L    Potassium 4.0 3.7 - 5.3 mmol/L    Chloride 107 98 - 107 mmol/L    CO2 21 20 - 31 mmol/L    Anion Gap 15 9 - 17 mmol/L    GFR Non-African American >60 >60 mL/min    GFR African American >60 >60 mL/min    GFR Comment EKG 12 Lead   Result Value Ref Range    Ventricular Rate 90 BPM    Atrial Rate 90 BPM    P-R Interval 152 ms    QRS Duration 68 ms    Q-T Interval 392 ms    QTc Calculation (Bazett) 479 ms    P Axis 115 degrees    R Axis 135 degrees    T Axis 124 degrees   EKG 12 lead   Result Value Ref Range    Ventricular Rate 80 BPM    Atrial Rate 80 BPM    P-R Interval 168 ms    QRS Duration 68 ms    Q-T Interval 402 ms    QTc Calculation (Bazett) 463 ms    P Axis 83 degrees    R Axis 64 degrees    T Axis 59 degrees     XR HAND LEFT (MIN 3 VIEWS)    Result Date: 2/8/2022  EXAMINATION: THREE XRAY VIEWS OF THE LEFT HAND 2/8/2022 12:13 pm COMPARISON: Three-view study of the left hand from 04/26/2016 HISTORY: ORDERING SYSTEM PROVIDED HISTORY: Pinky/ring finger laceration with saw TECHNOLOGIST PROVIDED HISTORY: Pinky/ring finger laceration with saw Reason for Exam: old injury, no pain FINDINGS: Again noted amputation mid and distal 4th phalanges and distal 1/2 3rd DP. Mild soft tissue swelling stump surrounding remaining 4th proximal phalanx. No marked new soft tissue swelling or large laceration. No radiopaque foreign body. No fracture. Mild degenerative changes again seen triscaphe and carpal 1st metacarpal/metacarpal 1st phalangeal joint spaces and interphalangeal joints, including 3rd D IP. No acute abnormality, with post prior amputation, mild soft tissue and degenerative changes, as above. XR CHEST PORTABLE    Result Date: 3/4/2022  EXAMINATION: ONE XRAY VIEW OF THE CHEST 3/4/2022 5:47 pm COMPARISON: 5 June 2021 HISTORY: ORDERING SYSTEM PROVIDED HISTORY: SOB TECHNOLOGIST PROVIDED HISTORY: SOB FINDINGS: AP view of the chest time stamped at 1743 hours submitted. Heart size is normal.  No vascular congestion, focal consolidation, effusion, or pneumothorax is noted. Osseous and mediastinal structures are age-appropriate. Lung fields are mildly hyperinflated. No acute cardiopulmonary process.   Mild hyperinflation of the lung fields. XR HIP 2-3 VW W PELVIS LEFT    Result Date: 2/8/2022  EXAMINATION: ONE XRAY VIEW OF THE PELVIS AND TWO XRAY VIEWS LEFT HIP 2/8/2022 10:16 am COMPARISON: Three-view study of the pelvis and left hip from 07/31/2021 HISTORY: ORDERING SYSTEM PROVIDED HISTORY: l hip pain, hx avascular necrosis TECHNOLOGIST PROVIDED HISTORY: l hip pain, hx avascular necrosis Additional history of osteoarthritis, substance abuse, and kidney injury. FINDINGS: Interval slight increase flattening and proliferative changes/aid with severe arthropathy left hip/suspected osteonecrosis. Also, slight increase adjacent subarticular cystic and sclerotic acetabular changes. No fracture or dislocation. Unchanged right hip hardware status post THR; slight increase adjacent proliferative heterotopic bony change laterally. Degenerative changes visualized spine. Moderate-large amount of retained stool. No acute abnormality. Interval worsening changes associated with suspected left hip osteonecrosis, as described. Slightly greater heterotopic bone adjacent to right hip hardware status post THR. Physical Exam:    General appearance - NAD, AOx 3   Lungs -CTAB, no R/R/R  Heart - RRR, no M/R/G  Abdomen - Soft, NT/ND  Neurological:  MAEx4, No focal motor deficit, sensory loss  Extremities - Cap refil <2 sec in all ext., no edema  Skin -warm, dry      Hospital Course:  Clinical course has improved, labs and imaging reviewed. Emi Daniels originally presented to the hospital on 3/4/2022  4:46 PM. with generalized weakness, patient was also without all her home meds due to social issues. Patient had known history of pancreatic cancer. At that time it was determined that She required further observation and placement. Patient was also evaluated by hematology oncology due to her history of pancreatic cancer. Patient was due to follow-up outpatient and was placed by case management. . She was admitted and labs and imaging were followed daily. Imaging results as above. She is medically stable to be discharged. Disposition: Home    Patient stated that they will not drive themselves home from the hospital if they have gotten pain killers/ narcotics earlier that day and that they will arrange for transportation on their own or work with the  for a ride. Patient counseled NOT to drive while under the influence of narcotics/ pain killers. Condition: Good    Patient stable and ready for discharge home. I have discussed plan of care with patient and they are in understanding. They were instructed to read discharge paperwork. All of their questions and concerns were addressed. Time Spent: 1 day      --  Janee Prater MD  Emergency Medicine Resident Physician    This dictation was generated by voice recognition computer software. Although all attempts are made to edit the dictation for accuracy, there may be errors in the transcription that are not intended.

## 2022-03-10 ENCOUNTER — TELEPHONE (OUTPATIENT)
Dept: FAMILY MEDICINE CLINIC | Age: 67
End: 2022-03-10

## 2022-03-11 ENCOUNTER — TELEPHONE (OUTPATIENT)
Dept: PAIN MANAGEMENT | Age: 67
End: 2022-03-11

## 2022-03-11 NOTE — TELEPHONE ENCOUNTER
Pt in [address] [place] [address] Getting pain meds from there cancelled f/u appointments thanks

## 2022-03-14 ENCOUNTER — TELEPHONE (OUTPATIENT)
Dept: ORTHOPEDIC SURGERY | Age: 67
End: 2022-03-14

## 2022-03-14 ENCOUNTER — OFFICE VISIT (OUTPATIENT)
Dept: ORTHOPEDIC SURGERY | Age: 67
End: 2022-03-14
Payer: MEDICARE

## 2022-03-14 VITALS — WEIGHT: 85 LBS | HEIGHT: 59 IN | BODY MASS INDEX: 17.14 KG/M2

## 2022-03-14 DIAGNOSIS — M87.052 AVASCULAR NECROSIS OF BONE OF HIP, LEFT (HCC): Primary | ICD-10-CM

## 2022-03-14 DIAGNOSIS — G89.29 CHRONIC LEFT HIP PAIN: ICD-10-CM

## 2022-03-14 DIAGNOSIS — M25.552 CHRONIC LEFT HIP PAIN: ICD-10-CM

## 2022-03-14 PROCEDURE — G8427 DOCREV CUR MEDS BY ELIG CLIN: HCPCS | Performed by: PHYSICIAN ASSISTANT

## 2022-03-14 PROCEDURE — G8399 PT W/DXA RESULTS DOCUMENT: HCPCS | Performed by: PHYSICIAN ASSISTANT

## 2022-03-14 PROCEDURE — 4040F PNEUMOC VAC/ADMIN/RCVD: CPT | Performed by: PHYSICIAN ASSISTANT

## 2022-03-14 PROCEDURE — 1123F ACP DISCUSS/DSCN MKR DOCD: CPT | Performed by: PHYSICIAN ASSISTANT

## 2022-03-14 PROCEDURE — 3017F COLORECTAL CA SCREEN DOC REV: CPT | Performed by: PHYSICIAN ASSISTANT

## 2022-03-14 PROCEDURE — G8419 CALC BMI OUT NRM PARAM NOF/U: HCPCS | Performed by: PHYSICIAN ASSISTANT

## 2022-03-14 PROCEDURE — G8484 FLU IMMUNIZE NO ADMIN: HCPCS | Performed by: PHYSICIAN ASSISTANT

## 2022-03-14 PROCEDURE — 4004F PT TOBACCO SCREEN RCVD TLK: CPT | Performed by: PHYSICIAN ASSISTANT

## 2022-03-14 PROCEDURE — 99213 OFFICE O/P EST LOW 20 MIN: CPT | Performed by: PHYSICIAN ASSISTANT

## 2022-03-14 PROCEDURE — 1090F PRES/ABSN URINE INCON ASSESS: CPT | Performed by: PHYSICIAN ASSISTANT

## 2022-03-14 PROCEDURE — 1111F DSCHRG MED/CURRENT MED MERGE: CPT | Performed by: PHYSICIAN ASSISTANT

## 2022-03-14 ASSESSMENT — ENCOUNTER SYMPTOMS
SHORTNESS OF BREATH: 0
VOMITING: 0
COLOR CHANGE: 0
COUGH: 0

## 2022-03-14 NOTE — PROGRESS NOTES
[address]  [address] [facility] [address]  Dept: [phone]  Dept Fax: [phone]        Ambulatory Follow Up      Subjective:   Emi Grewal is a 74y.o. year old female who presents to our office today for routine followup regarding her   1. Avascular necrosis of bone of hip, left ([address]    2. Chronic left hip pain        Chief Complaint   Patient presents with    Follow-up     L hip pain. HPI Emi Grewal  is a 74 y.o. female who presents today in follow for chronic left hip pain with avascular necrosis. The patient was last seen on 10/12/2020 and underwent treatment in the form of referral to pain management due to the fact she is not healthy enough to have a left total hip arthroplasty . The patient notes continued discomfort within the left hip. She is seeing Dr Rosie Viera with [facility] and has had 2 intra articular left hip injections, which improve her discomfort for a short amount of time. She has a pain management appointment tomorrow to get a refill of her pain medication for her hip. The patient recently got diagnoses with pancreatic cancer (April 2021) and is being treated by Dr. Tyson James at [facility]. The patient is currently a resident at [address] [place] [address] and states that otherwise she is homeless. She has difficulty with transportation. She arrived using a 4 wheeled walker. Review of Systems   Constitutional: Negative for activity change and fever. HENT: Negative for sneezing. Respiratory: Negative for cough and shortness of breath. Cardiovascular: Negative for chest pain. Gastrointestinal: Negative for vomiting. Musculoskeletal: Positive for arthralgias (chronic left hip). Negative for joint swelling and myalgias. Skin: Negative for color change. Neurological: Negative for weakness and numbness.    Psychiatric/Behavioral: Negative for sleep disturbance. Objective :   Ht 4' 11\" (1.499 m)   Wt 85 lb (38.6 kg)   LMP 03/18/2015   BMI 17.17 kg/m²  Body mass index is 17.17 kg/m². General: Emi Daniels is a 74 y.o. female who is alert and oriented and sitting comfortably in our office. Ortho Exam  MS:  Patient ambulates with a 4 wheeled walker with a shortened weightbearing phase and antalgic gait to the Left lower extremity. There is no erythema, warmth, skin lesions, signs of infection. Positive hip logroll and Stinchfield test is noted. Patient has full range of motion of the Left knee and ankle. Motor, sensory, and vascular examination to the Left lower extremity is intact without focal deficits. Neuro: alert and oriented to person and place. Eyes: Extra-ocular muscles intact  Mouth: Oral mucosa moist. No perioral lesions  Pulm: Respirations unlabored and regular. Symmetric chest excursion without outward deformity is noted. Skin: warm, well perfused  Psych:   Patient has good fund of knowledge and displays understanging of exam, diagnosis, and plan. Radiology:  No new imaging obtained today in office.       XR HIP LEFT - 2/8/2022  EXAMINATION:   ONE XRAY VIEW OF THE PELVIS AND TWO XRAY VIEWS LEFT HIP       2/8/2022 10:16 am       COMPARISON:   Three-view study of the pelvis and left hip from 07/31/2021       HISTORY:   ORDERING SYSTEM PROVIDED HISTORY: l hip pain, hx avascular necrosis   TECHNOLOGIST PROVIDED HISTORY:   l hip pain, hx avascular necrosis       Additional history of osteoarthritis, substance abuse, and kidney injury.       FINDINGS:   Interval slight increase flattening and proliferative changes/aid with severe   arthropathy left hip/suspected osteonecrosis.  Also, slight increase adjacent   subarticular cystic and sclerotic acetabular changes.  No fracture or   dislocation.       Unchanged right hip hardware status post THR; slight increase adjacent   proliferative heterotopic bony change laterally.     Degenerative changes visualized spine.  Moderate-large amount of retained   stool.           Impression   No acute abnormality.       Interval worsening changes associated with suspected left hip osteonecrosis,   as described.       Slightly greater heterotopic bone adjacent to right hip hardware status post   THR. Assessment:      1. Avascular necrosis of bone of hip, left ([address]    2. Chronic left hip pain       Plan:       The patient is here today for follow-up in regards to her left hip avascular necrosis and severe arthritis. She is currently staying at [address] [place] [address] and is to continue facility PT/OT to the best of her ability. She may continue Tylenol and Ibuprofen for her left hip discomfort. I am unable to prescribe anything stronger due to the fact she has an active pain contract with [facility]. She has an appointment with Dr Andrey Ricci tomorrow, 3/15/2022 where she will hopefully get a refill of her pain medication to help with her hip discomfort. She is to follow up as needed in regards to the left hip. We continued to have a discussion about the fact that she is not healthy enough medically to have a left total hip arthroplasty. She voiced her understanding. Follow up:Return if symptoms worsen or fail to improve. No orders of the defined types were placed in this encounter. No orders of the defined types were placed in this encounter. This note is created with the assistance of a speech recognition program.  While intending to generate a document that actually reflects the content of the visit, the document can still have some errors including those of syntax and sound a like substitutions which may escape proof reading. In such instances, actual meaning can be extrapolated by contextual diversion.      Electronically signed by Gunnar Zurita PA-C on 3/14/2022 at 9:00 PM

## 2022-03-14 NOTE — TELEPHONE ENCOUNTER
Called primary number in patients demographic page, s/w gianna who said pt was discharged from [facility] to [facility]. I left message with gianna to have patient call us if she gets a hold of her as the cell number listed for patient is not working.   Has an appt with tracee this afternoon, and per provider request, would like to r/s pt for this thursday

## 2022-03-14 NOTE — LETTER
Kiki Kemp, [address]  Dept: [phone]  Dept Fax: [phone]  3/14/22    Patient: Guilherme Daniels  YOB: 1955    Dear Sukhdev Cordon had the pleasure of seeing one of your patients, EMRE DANIELS today in the office. Below are the relevant portions of my assessment and plan of care. IMPRESSION:  1. Avascular necrosis of bone of hip, left ([address]    2. Chronic left hip pain      PLAN:    1.)  Continue physical therapy and occupational therapy for lower extremity strengthening. Please limit activities that increase his left hip pain. 2.)  Continue Tylenol and/or ibuprofen for lower extremity discomfort. No pain medication prescribed today due to the fact that the patient is following up with pain management tomorrow 3/15/2022. The patient is to follow-up as needed in regards to her left hip. Thank you for allowing me to participate in the care of this patient. I will keep you updated on this patient's follow up and I look forward to serving you and your patients again in the future.           Kiki Lane PA-C

## 2022-03-22 ENCOUNTER — TELEPHONE (OUTPATIENT)
Dept: ADMINISTRATIVE | Age: 67
End: 2022-03-22

## 2022-03-25 NOTE — TELEPHONE ENCOUNTER
Called pts son Latosha Navarrete had to leave a message pt needs to be seen to discuss hip injection

## 2022-04-07 ENCOUNTER — OFFICE VISIT (OUTPATIENT)
Dept: PAIN MANAGEMENT | Age: 67
End: 2022-04-07
Payer: MEDICARE

## 2022-04-07 VITALS
HEART RATE: 80 BPM | WEIGHT: 100 LBS | SYSTOLIC BLOOD PRESSURE: 125 MMHG | HEIGHT: 59 IN | OXYGEN SATURATION: 94 % | BODY MASS INDEX: 20.16 KG/M2 | DIASTOLIC BLOOD PRESSURE: 80 MMHG

## 2022-04-07 DIAGNOSIS — F19.11 HX OF DRUG ABUSE (HCC): ICD-10-CM

## 2022-04-07 DIAGNOSIS — R69 SEVERE COMORBID ILLNESS: ICD-10-CM

## 2022-04-07 DIAGNOSIS — M16.12 PRIMARY OSTEOARTHRITIS OF LEFT HIP: Primary | ICD-10-CM

## 2022-04-07 PROCEDURE — G8420 CALC BMI NORM PARAMETERS: HCPCS | Performed by: ANESTHESIOLOGY

## 2022-04-07 PROCEDURE — 1123F ACP DISCUSS/DSCN MKR DOCD: CPT | Performed by: ANESTHESIOLOGY

## 2022-04-07 PROCEDURE — 1090F PRES/ABSN URINE INCON ASSESS: CPT | Performed by: ANESTHESIOLOGY

## 2022-04-07 PROCEDURE — 1111F DSCHRG MED/CURRENT MED MERGE: CPT | Performed by: ANESTHESIOLOGY

## 2022-04-07 PROCEDURE — 4040F PNEUMOC VAC/ADMIN/RCVD: CPT | Performed by: ANESTHESIOLOGY

## 2022-04-07 PROCEDURE — 3017F COLORECTAL CA SCREEN DOC REV: CPT | Performed by: ANESTHESIOLOGY

## 2022-04-07 PROCEDURE — 4004F PT TOBACCO SCREEN RCVD TLK: CPT | Performed by: ANESTHESIOLOGY

## 2022-04-07 PROCEDURE — G8399 PT W/DXA RESULTS DOCUMENT: HCPCS | Performed by: ANESTHESIOLOGY

## 2022-04-07 PROCEDURE — 99213 OFFICE O/P EST LOW 20 MIN: CPT | Performed by: ANESTHESIOLOGY

## 2022-04-07 PROCEDURE — G8427 DOCREV CUR MEDS BY ELIG CLIN: HCPCS | Performed by: ANESTHESIOLOGY

## 2022-04-07 RX ORDER — GABAPENTIN 300 MG/1
300 CAPSULE ORAL 3 TIMES DAILY
Qty: 90 CAPSULE | Refills: 2 | Status: CANCELLED | OUTPATIENT
Start: 2022-04-07 | End: 2022-05-07

## 2022-04-07 ASSESSMENT — ENCOUNTER SYMPTOMS
VOMITING: 0
SHORTNESS OF BREATH: 0
EYE ITCHING: 1
EYE PAIN: 1
DIARRHEA: 0
EYE DISCHARGE: 1
CONSTIPATION: 0
SORE THROAT: 0
CHOKING: 0
NAUSEA: 0
EYE REDNESS: 1
WHEEZING: 0

## 2022-04-07 NOTE — PROGRESS NOTES
The patient is a 74 y. o. Non- / non  female. Chief Complaint   Patient presents with    Hip Pain    Medication Refill     Gabapentin        HPI    25-year-old woman history of chronic low back and hip pain  History of right total hip arthroplasty  Diagnosed with lumbar spondylosis and left hip arthritis  Past had left hip injection with good outcome  Last seen 7 months back  Today returns stating that she had several complications medically during last several months is currently homeless living in a long-term care facility    Review of the chart shows that she is prescribed about 8 tablets of Norco a day there  Reports pain score 9 out of 10  Planning to follow-up with oncology for a possible work-up related to her cancer diagnosis      Pain score Today:  9  Adverse effects (Constipation / Nausea / Sedation / sexual Dysfunction / others) : no  Mood: fair  Sleep pattern and quality: fair  Activity level: poor      Last dose taken  04/07/2022 AM  OARRS report reviewed today: yes  ER/Hospitalizations/PCP visit related to pain since last visit:no   Any legal problems e.g. DUI etc.:No  Satisfied with current management: Yes    Opioid Contract:11/2020  Last Urine Dug screen dated:11/2020    Lab Results   Component Value Date    LABA1C 5.3 01/27/2020     Lab Results   Component Value Date     01/27/2020       Past Medical History, Past Surgical History, Social History, Allergies and Medications reviewed and updated in EPIC as indicated    Family History reviewed and is noncontributory.       Past Medical History:   Diagnosis Date    Appendicitis 3/27/2017    Cerebral artery occlusion with cerebral infarction [facility])     CHF (congestive heart failure) (HCC)     Chronic respiratory failure with hypoxia and hypercapnia (HCC)     Chronic rhinitis     Cigarette smoker     COPD (chronic obstructive pulmonary disease) ([facility] [address] 8/5/2018    Depression     Dysphagia     Essential hypertension 8/11/2018 HEMORRHAGE performed by Nena Bledsoe MD at [facility]. N/A 4/5/2021    EGD W/EUS FNA performed by Svitlana Cobos MD at [facility]       Social History     Socioeconomic History    Marital status: Single     Spouse name: None    Number of children: None    Years of education: None    Highest education level: None   Occupational History     Employer: DISABLED   Tobacco Use    Smoking status: Current Some Day Smoker     Packs/day: 0.50     Years: 40.00     Pack years: 20.00     Types: Cigarettes     Start date: 5     Last attempt to quit: 11/2018     Years since quitting: 3.4    Smokeless tobacco: Never Used   Vaping Use    Vaping Use: Never used   Substance and Sexual Activity    Alcohol use: No     Alcohol/week: 0.0 standard drinks    Drug use: No     Types: IV, Cocaine     Comment: past history    Sexual activity: None   Other Topics Concern    None   Social History Narrative    None     Social Determinants of Health     Financial Resource Strain:     Difficulty of Paying Living Expenses: Not on file   Food Insecurity:     Worried About Running Out of Food in the Last Year: Not on file    Toby of Food in the Last Year: Not on file   Transportation Needs:     Lack of Transportation (Medical): Not on file    Lack of Transportation (Non-Medical):  Not on file   Physical Activity:     Days of Exercise per Week: Not on file    Minutes of Exercise per Session: Not on file   Stress:     Feeling of Stress : Not on file   Social Connections:     Frequency of Communication with Friends and Family: Not on file    Frequency of Social Gatherings with Friends and Family: Not on file    Attends Hindu Services: Not on file    Active Member of Clubs or Organizations: Not on file    Attends Club or Organization Meetings: Not on file    Marital Status: Not on file   Intimate Partner Violence:     Fear of Current or Ex-Partner: Not on file    Emotionally Abused: Not on file    Physically Abused: Not on file    Sexually Abused: Not on file   Housing Stability:     Unable to Pay for Housing in the Last Year: Not on file    Number of Places Lived in the Last Year: Not on file    Unstable Housing in the Last Year: Not on file       Family History   Problem Relation Age of Onset    Breast Cancer Mother     Heart Disease Father        Allergies   Allergen Reactions    Aspirin Itching    Keflet [Cephalexin]      Tolerated penicillin V 12/13, tolerates cephalexin 6/14       Vitals:    04/07/22 1153   BP: 125/80   Pulse: 80   SpO2: 94%       Current Outpatient Medications   Medication Sig Dispense Refill    lidocaine 4 % external patch Place 1 patch onto the skin daily 14 patch 0    gabapentin (NEURONTIN) 300 MG capsule Take 1 capsule by mouth 3 times daily for 30 days.  Intended supply: 90 days 90 capsule 2    ondansetron (ZOFRAN-ODT) 8 MG TBDP disintegrating tablet       mirtazapine (REMERON) 30 MG tablet       ICY HOT LIDOCAINE PLUS MENTHOL 4-1 % PTCH       imatinib (GLEEVEC) 400 MG chemo tablet       gabapentin (NEURONTIN) 300 MG capsule       famotidine (PEPCID) 20 MG tablet       butalbital-acetaminophen-caffeine (FIORICET, ESGIC) -40 MG per tablet Take 1 tablet by mouth every 12 hours for 14 days 28 tablet 0    ferrous sulfate (FE TABS 325) 325 (65 Fe) MG EC tablet Take 1 tablet by mouth daily 90 tablet 3    amitriptyline (ELAVIL) 10 MG tablet Take 1 tablet by mouth nightly for 14 days 14 tablet 0    atorvastatin (LIPITOR) 40 MG tablet Take 1 tablet by mouth daily for 14 days 14 tablet 0    acetaminophen (TYLENOL) 500 MG tablet Take 2 tablets by mouth every 6 hours as needed for Pain 40 tablet 0    mometasone-formoterol (DULERA) 100-5 MCG/ACT inhaler Inhale 2 puffs into the lungs 2 times daily 1 Inhaler 1    lisinopril (PRINIVIL;ZESTRIL) 10 MG tablet Take 1 tablet by mouth daily 30 tablet 0    clopidogrel (PLAVIX) 75 MG tablet Take 1 tablet by mouth daily 30 tablet 3    carvedilol (COREG) 6.25 MG tablet Take 1 tablet by mouth 2 times daily (with meals) 60 tablet 3    atorvastatin (LIPITOR) 40 MG tablet Take 1 tablet by mouth nightly 30 tablet 3    apixaban (ELIQUIS) 5 MG TABS tablet Take 1 tablet by mouth 2 times daily 60 tablet 3    amitriptyline (ELAVIL) 10 MG tablet Take 1 tablet by mouth nightly 30 tablet 3    albuterol sulfate HFA (PROVENTIL HFA) 108 (90 Base) MCG/ACT inhaler Inhale 1-2 puffs into the lungs once for 1 dose 1 Inhaler 0    Misc. Devices [facility]) MISC 1 Device by Does not apply route once for 1 dose 1 each 0    ipratropium-albuterol (DUONEB) 0.5-2.5 (3) MG/3ML SOLN nebulizer solution Inhale 3 mLs into the lungs every 4 hours as needed for Shortness of Breath 360 mL 5    vitamin B-1 100 MG tablet Take 1 tablet by mouth daily 30 tablet 3    vitamin B-12 250 MCG tablet Take 1 tablet by mouth daily 30 tablet 3    Multiple Vitamins-Minerals (THERAPEUTIC MULTIVITAMIN-MINERALS) tablet Take 1 tablet by mouth daily 30 tablet 3     No current facility-administered medications for this visit. Review of Systems   Constitutional: Negative for chills and fever. HENT: Negative for congestion and sore throat. Eyes: Positive for pain, discharge, redness and itching. Respiratory: Negative for choking, shortness of breath and wheezing. Gastrointestinal: Negative for constipation, diarrhea, nausea and vomiting. Endocrine: Negative for cold intolerance and heat intolerance. Musculoskeletal: Positive for gait problem and joint swelling. Hematological: Does not bruise/bleed easily. Objective:  General Appearance:  Well-appearing and in no acute distress. Vital signs: (most recent): Height 4' 11\" (1.499 m), weight 100 lb (45.4 kg), last menstrual period 03/18/2015. No fever. Output: Producing urine and producing stool.     HEENT: (No visible masses in neck  Range of motion appears normal on cervical spine  External ears appears normal)    Lungs:  Normal effort. She is not in respiratory distress. Neurological: Patient is alert and oriented to person, place and time.  (Able to follow command    Psych  Mood is good  Affect is normal). Skin:  No rash or cyanosis. Assessment & Plan  1. Primary osteoarthritis of left hip    2. Severe comorbid illness    3. Hx of drug abuse ([facility] [address]        Unresolved of multiple recent medical issues  Patient is advised to follow-up with her ongoing medical problems and establish care with a primary care physician  I will avoid any interventional procedure at this time  Medication management to the discretion of prescribing physician    No orders of the defined types were placed in this encounter. No orders of the defined types were placed in this encounter.            Electronically signed by Melody Anguiano MD on 4/7/2022 at 12:31 PM

## 2022-04-20 ENCOUNTER — HOSPITAL ENCOUNTER (EMERGENCY)
Age: 67
Discharge: ANOTHER HOSPITAL | End: 2022-04-21
Attending: EMERGENCY MEDICINE
Payer: MEDICARE

## 2022-04-20 DIAGNOSIS — I61.0 BASAL GANGLIA HEMORRHAGE (HCC): ICD-10-CM

## 2022-04-20 DIAGNOSIS — R41.82 ALTERED MENTAL STATUS, UNSPECIFIED ALTERED MENTAL STATUS TYPE: Primary | ICD-10-CM

## 2022-04-20 PROCEDURE — 85025 COMPLETE CBC W/AUTO DIFF WBC: CPT

## 2022-04-20 PROCEDURE — 99285 EMERGENCY DEPT VISIT HI MDM: CPT

## 2022-04-20 PROCEDURE — 93005 ELECTROCARDIOGRAM TRACING: CPT | Performed by: EMERGENCY MEDICINE

## 2022-04-21 ENCOUNTER — APPOINTMENT (OUTPATIENT)
Dept: GENERAL RADIOLOGY | Age: 67
DRG: 044 | End: 2022-04-21
Payer: MEDICARE

## 2022-04-21 ENCOUNTER — APPOINTMENT (OUTPATIENT)
Dept: CT IMAGING | Age: 67
DRG: 044 | End: 2022-04-21
Payer: MEDICARE

## 2022-04-21 ENCOUNTER — APPOINTMENT (OUTPATIENT)
Dept: CT IMAGING | Age: 67
End: 2022-04-21
Payer: MEDICARE

## 2022-04-21 ENCOUNTER — HOSPITAL ENCOUNTER (INPATIENT)
Age: 67
LOS: 3 days | Discharge: NURSING, REHAB OR LONG-TERM CARE | DRG: 044 | End: 2022-04-24
Attending: EMERGENCY MEDICINE
Payer: MEDICARE

## 2022-04-21 VITALS
HEART RATE: 74 BPM | RESPIRATION RATE: 16 BRPM | WEIGHT: 100 LBS | HEIGHT: 59 IN | OXYGEN SATURATION: 100 % | DIASTOLIC BLOOD PRESSURE: 85 MMHG | TEMPERATURE: 98.6 F | BODY MASS INDEX: 20.16 KG/M2 | SYSTOLIC BLOOD PRESSURE: 150 MMHG

## 2022-04-21 DIAGNOSIS — I61.9 INTRAPARENCHYMAL HEMORRHAGE OF BRAIN (HCC): Primary | ICD-10-CM

## 2022-04-21 DIAGNOSIS — C49.A0 GASTROINTESTINAL STROMAL TUMOR (GIST) (HCC): ICD-10-CM

## 2022-04-21 PROBLEM — I61.0 BASAL GANGLIA HEMORRHAGE (HCC): Status: ACTIVE | Noted: 2022-04-21

## 2022-04-21 LAB
-: ABNORMAL
ABSOLUTE EOS #: 0.3 K/UL (ref 0–0.44)
ABSOLUTE IMMATURE GRANULOCYTE: 0.05 K/UL (ref 0–0.3)
ABSOLUTE LYMPH #: 2.15 K/UL (ref 1.1–3.7)
ABSOLUTE MONO #: 1 K/UL (ref 0.1–1.2)
ALBUMIN SERPL-MCNC: 3.9 G/DL (ref 3.5–5.2)
ALP BLD-CCNC: 139 U/L (ref 35–104)
ALT SERPL-CCNC: 41 U/L (ref 5–33)
ANION GAP SERPL CALCULATED.3IONS-SCNC: 13 MMOL/L (ref 9–17)
AST SERPL-CCNC: 57 U/L
BASOPHILS # BLD: 0 % (ref 0–2)
BASOPHILS ABSOLUTE: <0.03 K/UL (ref 0–0.2)
BILIRUB SERPL-MCNC: 0.31 MG/DL (ref 0.3–1.2)
BILIRUBIN URINE: NEGATIVE
BUN BLDV-MCNC: 14 MG/DL (ref 8–23)
BUN/CREAT BLD: 23 (ref 9–20)
CALCIUM SERPL-MCNC: 9.1 MG/DL (ref 8.6–10.4)
CASTS UA: ABNORMAL /LPF (ref 0–8)
CHLORIDE BLD-SCNC: 102 MMOL/L (ref 98–107)
CO2: 20 MMOL/L (ref 20–31)
COLOR: YELLOW
CREAT SERPL-MCNC: 0.6 MG/DL (ref 0.5–0.9)
EKG ATRIAL RATE: 83 BPM
EKG P AXIS: 63 DEGREES
EKG P-R INTERVAL: 198 MS
EKG Q-T INTERVAL: 382 MS
EKG QRS DURATION: 80 MS
EKG QTC CALCULATION (BAZETT): 448 MS
EKG R AXIS: 20 DEGREES
EKG T AXIS: 65 DEGREES
EKG VENTRICULAR RATE: 83 BPM
EOSINOPHILS RELATIVE PERCENT: 3 % (ref 1–4)
EPITHELIAL CELLS UA: ABNORMAL /HPF (ref 0–5)
ESTIMATED AVERAGE GLUCOSE: 82 MG/DL
ETHANOL PERCENT: <0.01 %
ETHANOL: <10 MG/DL
GFR AFRICAN AMERICAN: >60 ML/MIN
GFR NON-AFRICAN AMERICAN: >60 ML/MIN
GFR SERPL CREATININE-BSD FRML MDRD: ABNORMAL ML/MIN/{1.73_M2}
GLUCOSE BLD-MCNC: 109 MG/DL (ref 70–99)
GLUCOSE URINE: NEGATIVE
HBA1C MFR BLD: 4.5 % (ref 4–6)
HCT VFR BLD CALC: 41.9 % (ref 36.3–47.1)
HEMOGLOBIN: 13.1 G/DL (ref 11.9–15.1)
IMMATURE GRANULOCYTES: 0 %
INR BLD: 1
KETONES, URINE: NEGATIVE
LEUKOCYTE ESTERASE, URINE: ABNORMAL
LYMPHOCYTES # BLD: 18 % (ref 24–43)
MCH RBC QN AUTO: 34.2 PG (ref 25.2–33.5)
MCHC RBC AUTO-ENTMCNC: 31.3 G/DL (ref 28.4–34.8)
MCV RBC AUTO: 109.4 FL (ref 82.6–102.9)
MONOCYTES # BLD: 8 % (ref 3–12)
NITRITE, URINE: NEGATIVE
NRBC AUTOMATED: 0 PER 100 WBC
PARTIAL THROMBOPLASTIN TIME: 24.2 SEC (ref 23.9–33.8)
PDW BLD-RTO: 13.9 % (ref 11.8–14.4)
PH UA: 6 (ref 5–8)
PLATELET # BLD: 518 K/UL (ref 138–453)
PMV BLD AUTO: 10.8 FL (ref 8.1–13.5)
POTASSIUM SERPL-SCNC: 5.2 MMOL/L (ref 3.7–5.3)
PROTEIN UA: ABNORMAL
PROTHROMBIN TIME: 13 SEC (ref 11.5–14.2)
RBC # BLD: 3.83 M/UL (ref 3.95–5.11)
RBC # BLD: ABNORMAL 10*6/UL
RBC UA: ABNORMAL /HPF (ref 0–4)
REASON FOR REJECTION: NORMAL
SARS-COV-2, RAPID: NOT DETECTED
SEG NEUTROPHILS: 71 % (ref 36–65)
SEGMENTED NEUTROPHILS ABSOLUTE COUNT: 8.46 K/UL (ref 1.5–8.1)
SODIUM BLD-SCNC: 135 MMOL/L (ref 135–144)
SPECIFIC GRAVITY UA: 1.02 (ref 1–1.03)
SPECIMEN DESCRIPTION: NORMAL
TOTAL PROTEIN: 8.2 G/DL (ref 6.4–8.3)
TROPONIN, HIGH SENSITIVITY: 10 NG/L (ref 0–14)
TROPONIN, HIGH SENSITIVITY: 11 NG/L (ref 0–14)
TURBIDITY: CLEAR
URINE HGB: NEGATIVE
UROBILINOGEN, URINE: NORMAL
WBC # BLD: 12 K/UL (ref 3.5–11.3)
WBC UA: ABNORMAL /HPF (ref 0–5)
ZZ NTE CLEAN UP: ORDERED TEST: NORMAL
ZZ NTE WITH NAME CLEAN UP: SPECIMEN SOURCE: NORMAL

## 2022-04-21 PROCEDURE — 2700000000 HC OXYGEN THERAPY PER DAY

## 2022-04-21 PROCEDURE — G0480 DRUG TEST DEF 1-7 CLASSES: HCPCS

## 2022-04-21 PROCEDURE — 6370000000 HC RX 637 (ALT 250 FOR IP): Performed by: PSYCHIATRY & NEUROLOGY

## 2022-04-21 PROCEDURE — 93005 ELECTROCARDIOGRAM TRACING: CPT | Performed by: STUDENT IN AN ORGANIZED HEALTH CARE EDUCATION/TRAINING PROGRAM

## 2022-04-21 PROCEDURE — 6360000002 HC RX W HCPCS: Performed by: PSYCHIATRY & NEUROLOGY

## 2022-04-21 PROCEDURE — 6360000004 HC RX CONTRAST MEDICATION: Performed by: HEALTH CARE PROVIDER

## 2022-04-21 PROCEDURE — 85730 THROMBOPLASTIN TIME PARTIAL: CPT

## 2022-04-21 PROCEDURE — 83036 HEMOGLOBIN GLYCOSYLATED A1C: CPT

## 2022-04-21 PROCEDURE — 2060000000 HC ICU INTERMEDIATE R&B

## 2022-04-21 PROCEDURE — 95819 EEG AWAKE AND ASLEEP: CPT | Performed by: PSYCHIATRY & NEUROLOGY

## 2022-04-21 PROCEDURE — 97530 THERAPEUTIC ACTIVITIES: CPT

## 2022-04-21 PROCEDURE — 6370000000 HC RX 637 (ALT 250 FOR IP): Performed by: STUDENT IN AN ORGANIZED HEALTH CARE EDUCATION/TRAINING PROGRAM

## 2022-04-21 PROCEDURE — 87635 SARS-COV-2 COVID-19 AMP PRB: CPT

## 2022-04-21 PROCEDURE — 70450 CT HEAD/BRAIN W/O DYE: CPT

## 2022-04-21 PROCEDURE — 97535 SELF CARE MNGMENT TRAINING: CPT

## 2022-04-21 PROCEDURE — 99285 EMERGENCY DEPT VISIT HI MDM: CPT

## 2022-04-21 PROCEDURE — 85610 PROTHROMBIN TIME: CPT

## 2022-04-21 PROCEDURE — 97166 OT EVAL MOD COMPLEX 45 MIN: CPT

## 2022-04-21 PROCEDURE — 70498 CT ANGIOGRAPHY NECK: CPT

## 2022-04-21 PROCEDURE — 80053 COMPREHEN METABOLIC PANEL: CPT

## 2022-04-21 PROCEDURE — 36415 COLL VENOUS BLD VENIPUNCTURE: CPT

## 2022-04-21 PROCEDURE — 95819 EEG AWAKE AND ASLEEP: CPT

## 2022-04-21 PROCEDURE — 2000000003 HC NEURO ICU R&B

## 2022-04-21 PROCEDURE — 94761 N-INVAS EAR/PLS OXIMETRY MLT: CPT

## 2022-04-21 PROCEDURE — 87086 URINE CULTURE/COLONY COUNT: CPT

## 2022-04-21 PROCEDURE — 99291 CRITICAL CARE FIRST HOUR: CPT | Performed by: PSYCHIATRY & NEUROLOGY

## 2022-04-21 PROCEDURE — 84484 ASSAY OF TROPONIN QUANT: CPT

## 2022-04-21 PROCEDURE — 97162 PT EVAL MOD COMPLEX 30 MIN: CPT

## 2022-04-21 PROCEDURE — 81001 URINALYSIS AUTO W/SCOPE: CPT

## 2022-04-21 PROCEDURE — 71045 X-RAY EXAM CHEST 1 VIEW: CPT

## 2022-04-21 PROCEDURE — 99221 1ST HOSP IP/OBS SF/LOW 40: CPT | Performed by: NEUROLOGICAL SURGERY

## 2022-04-21 RX ORDER — OXYCODONE HYDROCHLORIDE 5 MG/1
10 TABLET ORAL EVERY 4 HOURS PRN
Status: DISCONTINUED | OUTPATIENT
Start: 2022-04-21 | End: 2022-04-22

## 2022-04-21 RX ORDER — LABETALOL HYDROCHLORIDE 5 MG/ML
10 INJECTION, SOLUTION INTRAVENOUS
Status: DISCONTINUED | OUTPATIENT
Start: 2022-04-21 | End: 2022-04-24 | Stop reason: HOSPADM

## 2022-04-21 RX ORDER — IPRATROPIUM BROMIDE AND ALBUTEROL SULFATE 2.5; .5 MG/3ML; MG/3ML
1 SOLUTION RESPIRATORY (INHALATION) EVERY 4 HOURS PRN
Status: DISCONTINUED | OUTPATIENT
Start: 2022-04-21 | End: 2022-04-24 | Stop reason: HOSPADM

## 2022-04-21 RX ORDER — ATORVASTATIN CALCIUM 40 MG/1
40 TABLET, FILM COATED ORAL NIGHTLY
Status: DISCONTINUED | OUTPATIENT
Start: 2022-04-21 | End: 2022-04-24 | Stop reason: HOSPADM

## 2022-04-21 RX ORDER — LEVETIRACETAM 10 MG/ML
1000 INJECTION INTRAVASCULAR ONCE
Status: COMPLETED | OUTPATIENT
Start: 2022-04-21 | End: 2022-04-21

## 2022-04-21 RX ORDER — FAMOTIDINE 20 MG/1
20 TABLET, FILM COATED ORAL DAILY
Status: DISCONTINUED | OUTPATIENT
Start: 2022-04-21 | End: 2022-04-24 | Stop reason: HOSPADM

## 2022-04-21 RX ORDER — ONDANSETRON 4 MG/1
4 TABLET, ORALLY DISINTEGRATING ORAL EVERY 8 HOURS PRN
Status: DISCONTINUED | OUTPATIENT
Start: 2022-04-21 | End: 2022-04-24 | Stop reason: HOSPADM

## 2022-04-21 RX ORDER — AMITRIPTYLINE HYDROCHLORIDE 10 MG/1
10 TABLET, FILM COATED ORAL NIGHTLY
Status: DISCONTINUED | OUTPATIENT
Start: 2022-04-21 | End: 2022-04-24 | Stop reason: HOSPADM

## 2022-04-21 RX ORDER — ALBUTEROL SULFATE 90 UG/1
1 AEROSOL, METERED RESPIRATORY (INHALATION) DAILY PRN
Status: DISCONTINUED | OUTPATIENT
Start: 2022-04-21 | End: 2022-04-24 | Stop reason: HOSPADM

## 2022-04-21 RX ORDER — OXYCODONE HYDROCHLORIDE 5 MG/1
5 TABLET ORAL EVERY 4 HOURS PRN
Status: DISCONTINUED | OUTPATIENT
Start: 2022-04-21 | End: 2022-04-22

## 2022-04-21 RX ORDER — CARVEDILOL 6.25 MG/1
6.25 TABLET ORAL 2 TIMES DAILY WITH MEALS
Status: DISCONTINUED | OUTPATIENT
Start: 2022-04-21 | End: 2022-04-22

## 2022-04-21 RX ORDER — LEVETIRACETAM 5 MG/ML
500 INJECTION INTRAVASCULAR EVERY 12 HOURS
Status: DISCONTINUED | OUTPATIENT
Start: 2022-04-21 | End: 2022-04-21

## 2022-04-21 RX ORDER — ONDANSETRON 2 MG/ML
4 INJECTION INTRAMUSCULAR; INTRAVENOUS EVERY 6 HOURS PRN
Status: DISCONTINUED | OUTPATIENT
Start: 2022-04-21 | End: 2022-04-24 | Stop reason: HOSPADM

## 2022-04-21 RX ORDER — ACETAMINOPHEN 325 MG/1
650 TABLET ORAL EVERY 4 HOURS PRN
Status: DISCONTINUED | OUTPATIENT
Start: 2022-04-21 | End: 2022-04-22

## 2022-04-21 RX ADMIN — FAMOTIDINE 20 MG: 20 TABLET, FILM COATED ORAL at 09:03

## 2022-04-21 RX ADMIN — OXYCODONE 10 MG: 5 TABLET ORAL at 21:35

## 2022-04-21 RX ADMIN — IOPAMIDOL 90 ML: 755 INJECTION, SOLUTION INTRAVENOUS at 04:29

## 2022-04-21 RX ADMIN — DESMOPRESSIN ACETATE 40 MG: 0.2 TABLET ORAL at 20:33

## 2022-04-21 RX ADMIN — AMITRIPTYLINE HYDROCHLORIDE 10 MG: 10 TABLET, FILM COATED ORAL at 20:33

## 2022-04-21 RX ADMIN — LEVETIRACETAM 1000 MG: 10 INJECTION INTRAVENOUS at 05:17

## 2022-04-21 RX ADMIN — OXYCODONE 10 MG: 5 TABLET ORAL at 09:40

## 2022-04-21 RX ADMIN — CARVEDILOL 6.25 MG: 6.25 TABLET, FILM COATED ORAL at 09:03

## 2022-04-21 RX ADMIN — CARVEDILOL 6.25 MG: 6.25 TABLET, FILM COATED ORAL at 17:36

## 2022-04-21 RX ADMIN — OXYCODONE 5 MG: 5 TABLET ORAL at 17:36

## 2022-04-21 RX ADMIN — ACETAMINOPHEN 650 MG: 325 TABLET ORAL at 21:33

## 2022-04-21 RX ADMIN — ACETAMINOPHEN 650 MG: 325 TABLET ORAL at 09:03

## 2022-04-21 ASSESSMENT — ENCOUNTER SYMPTOMS
VOMITING: 0
ABDOMINAL PAIN: 0
SHORTNESS OF BREATH: 0
NAUSEA: 0
BACK PAIN: 0

## 2022-04-21 ASSESSMENT — PAIN SCALES - GENERAL
PAINLEVEL_OUTOF10: 10
PAINLEVEL_OUTOF10: 8
PAINLEVEL_OUTOF10: 10
PAINLEVEL_OUTOF10: 10

## 2022-04-21 ASSESSMENT — PAIN DESCRIPTION - LOCATION
LOCATION: ABDOMEN
LOCATION: ABDOMEN;HIP
LOCATION: BACK;HEAD
LOCATION: ABDOMEN
LOCATION: ABDOMEN;HIP

## 2022-04-21 ASSESSMENT — PAIN DESCRIPTION - DESCRIPTORS
DESCRIPTORS: ACHING;SHARP
DESCRIPTORS: ACHING;SHARP

## 2022-04-21 ASSESSMENT — PAIN DESCRIPTION - ORIENTATION: ORIENTATION: LEFT

## 2022-04-21 ASSESSMENT — PAIN DESCRIPTION - PAIN TYPE: TYPE: CHRONIC PAIN

## 2022-04-21 NOTE — ED NOTES
Ambulated to bathroom with one assist  Patient arguementive about walking  Tolerated well  Returned to bed  Side rails up x2, call light at side    The following labs labeled with pt sticker and tubed to lab: by me  [] Blue     [] Lavender   [] on ice  [] Green/yellow  [] Green/black [] on ice  [] Yellow  [] Red  [] Pink      [] COVID-19 swab    [] Rapid  [] PCR  [] Flu swab  [] Peds Viral Panel     [x] Urine Sample  [] Pelvic Cultures  [] Blood Cultures            Robert Argueta RN  04/21/22 1056

## 2022-04-21 NOTE — ED NOTES
Pt arrived to ed via ems with c/o possible loss of consciousness. Ems reports pt went to dinner, unknown if alcohol was consumed at dinner, evening meds were given and pt was found with decreased mental status. EMS gave 4mg Narcan IN because of pinpoint pupils. Pt answering questions appropriate at this time. Pt vital signs within normal range. Respirations non labored. Pt A&Ox4.       Americo Dickinson RN  04/21/22 9749

## 2022-04-21 NOTE — PROGRESS NOTES
[id number]  Speech Language Pathology    Date: 4/21/2022  Patient Name: Diony Cox  YOB: 1955   AGE: 69 y.o. MRN: [id number]        Patient Not Available for Speech Therapy     Due to:  [] Testing  [] Hemodialysis  [] Cancelled by RN  [] Surgery   [] Intubation/Sedation/Pain Medication  [] Medical instability  [x] Other:  Pt. With PT/OT    Next scheduled treatment: 4/22/22  Completed by:  BRITNEY Rodriguez, M.S. [id number] [facility] ([place]

## 2022-04-21 NOTE — CONSULTS
TOTAL ARTHROPLASTY ANTERIOR APPROACH - [facility],  C-ARM, (Right )    LAPAROSCOPIC APPENDECTOMY N/A 3/27/2017    APPENDECTOMY LAPAROSCOPIC performed by Peter Kilgore MD at [address] [facility]  2002    hgsil    MEDICATION INJECTION Left 3/25/2021    STEROID INJECTION LEFT HIP  WITH FLUORO performed by Margorie Crigler, MD at [address] Left 7/12/2021    INJECTION STEROID LEFT HIP WITH FLUORO- ATTEMPTED performed by Margorie Crigler, MD at [address] Left 7/12/2021    INJECTION STEROID LEFT HIP performed by Margorie Crigler, MD at [address] [place] [address] Left 07/12/2021    hip injection    IL COLONOSCOPY W/BIOPSY SINGLE/MULTIPLE N/A 11/10/2018    COLONOSCOPY WITH BIOPSY performed by Koko Correia MD at [facility]    IL EGD TRANSORAL BIOPSY SINGLE/MULTIPLE N/A 11/9/2018    EGD ESOPHAGOGASTRODUODENOSCOPY performed by Yenni Gaming MD at [address] [facility] Right 8/13/2019    HIP TOTAL ARTHROPLASTY ANTERIOR APPROACH - [facility],  C-ARM, performed by Paul Schneider DO at [address] [facility]  2008    oesophagitis, candidiasis    UPPER GASTROINTESTINAL ENDOSCOPY  11/05/2018    UPPER GASTROINTESTINAL ENDOSCOPY N/A 11/5/2018    EGD CONTROL HEMORRHAGE performed by Yenni Gaming MD at [address] 4/5/2021    EGD W/EUS FNA performed by Daniel Atkinson MD at [facility]       Social History:   Social History     Socioeconomic History    Marital status: Single     Spouse name: Not on file    Number of children: Not on file    Years of education: Not on file    Highest education level: Not on file   Occupational History     Employer: DISABLED   Tobacco Use    Smoking status: Current Some Day Smoker     Packs/day: 0.50     Years: 40.00     Pack years: 20.00     Types: Cigarettes     Start date: 5     Last attempt to quit: 11/2018     Years since quitting: 3.4    Smokeless tobacco: Never Used   Vaping Use    Vaping Use: Never used   Substance and Sexual Activity    Alcohol use: No     Alcohol/week: 0.0 standard drinks    Drug use: No     Types: IV, Cocaine     Comment: past history    Sexual activity: Not on file   Other Topics Concern    Not on file   Social History Narrative    Not on file     Social Determinants of Health     Financial Resource Strain:     Difficulty of Paying Living Expenses: Not on file   Food Insecurity:     Worried About Running Out of Food in the Last Year: Not on file    Toby of Food in the Last Year: Not on file   Transportation Needs:     Lack of Transportation (Medical): Not on file    Lack of Transportation (Non-Medical): Not on file   Physical Activity:     Days of Exercise per Week: Not on file    Minutes of Exercise per Session: Not on file   Stress:     Feeling of Stress : Not on file   Social Connections:     Frequency of Communication with Friends and Family: Not on file    Frequency of Social Gatherings with Friends and Family: Not on file    Attends Synagogue Services: Not on file    Active Member of [address] [place] [address] or Organizations: Not on file    Attends Club or Organization Meetings: Not on file    Marital Status: Not on file   Intimate Partner Violence:     Fear of Current or Ex-Partner: Not on file    Emotionally Abused: Not on file    Physically Abused: Not on file    Sexually Abused: Not on file   Housing Stability:     Unable to Pay for Housing in the Last Year: Not on file    Number of [place] in the Last Year: Not on file    Unstable Housing in the Last Year: Not on file       Family History:       Problem Relation Age of Onset    Breast Cancer Mother     Heart Disease Father        Allergies:  Aspirin and Emily Yandel Gibbons [cephalexin]    Home Medications:  Prior to Admission medications    Medication Sig Start Date End Date Taking?  Authorizing Provider   gabapentin (NEURONTIN) 300 MG capsule Take 1 capsule by mouth 3 times daily for 30 days. Intended supply: 90 days 9/27/21 10/27/21  Wood Narayan, APRN - CNP   ondansetron (ZOFRAN-ODT) 8 MG TBDP disintegrating tablet  9/23/21   Historical Provider, MD   mirtazapine (REMERON) 30 MG tablet  9/6/21   Historical Provider, MD   famotidine (PEPCID) 20 MG tablet  8/24/21   Historical Provider, MD   butalbital-acetaminophen-caffeine (FIORICET, ESGIC) -40 MG per tablet Take 1 tablet by mouth every 12 hours for 14 days 5/13/21 9/27/21  E Salvatore Habermann, MD   ferrous sulfate (FE TABS 325) 325 (65 Fe) MG EC tablet Take 1 tablet by mouth daily 5/14/21   E Salvatore Habermann, MD   amitriptyline (ELAVIL) 10 MG tablet Take 1 tablet by mouth nightly for 14 days 5/13/21 9/27/21  E Salvatore Habermann, MD   atorvastatin (LIPITOR) 40 MG tablet Take 1 tablet by mouth daily for 14 days 5/13/21 9/27/21  E Salvatore Habermann, MD   acetaminophen (TYLENOL) 500 MG tablet Take 2 tablets by mouth every 6 hours as needed for Pain 5/7/21   Tara Leung, DO   mometasone-formoterol (DULERA) 100-5 MCG/ACT inhaler Inhale 2 puffs into the lungs 2 times daily 4/6/21   Rodolfo Arora,    clopidogrel (PLAVIX) 75 MG tablet Take 1 tablet by mouth daily 4/6/21   Rodolfo Arora,    carvedilol (COREG) 6.25 MG tablet Take 1 tablet by mouth 2 times daily (with meals) 4/6/21   Rodolfo Arora,    atorvastatin (LIPITOR) 40 MG tablet Take 1 tablet by mouth nightly 4/6/21   Rodolfo Arora DO   amitriptyline (ELAVIL) 10 MG tablet Take 1 tablet by mouth nightly 4/6/21   Rodolfo Arora,    albuterol sulfate HFA (PROVENTIL HFA) 108 (90 Base) MCG/ACT inhaler Inhale 1-2 puffs into the lungs once for 1 dose 4/6/21 9/27/21  Rodolfo Arora,    amLODIPine (NORVASC) 10 MG tablet Take 1 tablet by mouth daily 2/9/21 2/9/21  Jessica Duran MD   [facility].  Devices [facility] [place]) MISC 1 Device by Does not apply route once for 1 dose 8/5/19 6/6/21  Brooklyn Mccray PA-C   ipratropium-albuterol (DUONEB) 0.5-2.5 (3) MG/3ML SOLN nebulizer solution Inhale 3 mLs into the lungs every 4 hours as needed for Shortness of Breath 1/11/19   Joselo Jain MD   vitamin B-1 100 MG tablet Take 1 tablet by mouth daily 10/13/18   Palomo Garcia MD   vitamin B-12 250 MCG tablet Take 1 tablet by mouth daily 10/13/18   Palomo Garcia MD   Multiple Vitamins-Minerals (THERAPEUTIC MULTIVITAMIN-MINERALS) tablet Take 1 tablet by mouth daily 8/12/18   Tatum Cole MD       Current Medications:   Current Facility-Administered Medications: acetaminophen (TYLENOL) tablet 650 mg, 650 mg, Oral, Q4H PRN  ondansetron (ZOFRAN-ODT) disintegrating tablet 4 mg, 4 mg, Oral, Q8H PRN **OR** ondansetron (ZOFRAN) injection 4 mg, 4 mg, IntraVENous, Q6H PRN  levETIRAcetam (KEPPRA) 500 mg/100 mL IVPB, 500 mg, IntraVENous, Q12H  albuterol sulfate  (90 Base) MCG/ACT inhaler 1 puff, 1 puff, Inhalation, Daily PRN  amitriptyline (ELAVIL) tablet 10 mg, 10 mg, Oral, Nightly  atorvastatin (LIPITOR) tablet 40 mg, 40 mg, Oral, Nightly  carvedilol (COREG) tablet 6.25 mg, 6.25 mg, Oral, BID WC  famotidine (PEPCID) tablet 20 mg, 20 mg, Oral, Daily  ipratropium-albuterol (DUONEB) nebulizer solution 3 mL, 1 vial, Inhalation, Q4H PRN    REVIEW OF SYSTEMS:         CONSTITUTIONAL: negative for fatigue   EYES: negative for double vision, blurry vision, and photophobia    HEENT: negative for tinnitus and sore throat   RESPIRATORY: negative for cough, shortness of breath   CARDIOVASCULAR: negative for chest pain, palpitations, or syncope   GASTROINTESTINAL: negative for abdominal pain, nausea, vomiting   GENITOURINARY: negative for incontinence or retention    MUSCULOSKELETAL: negative for neck or back pain, negative for extremity pain   NEUROLOGICAL: Positive for AMS, Negative for seizures, headaches, weakness, numbness, aphasia, dysarthria    PSYCHIATRIC: negative for agitation, hallucination, SI/HI   SKIN Negative for spontaneous contusions, rashes, or lesions        Review of systems otherwise negative.     PHYSICAL EXAM:       /63   Pulse 68   Temp 97.9 °F (36.6 °C) (Oral)   Resp 15   LMP 03/18/2015   SpO2 94%     CONSTITUTIONAL:  Well developed, well nourished, alert and oriented x 3, in no acute distress. GCS 15. Nontoxic. No dysarthria. No aphasia. HEAD:  normocephalic, atraumatic    EYES:  PERRLA, EOMI. Visual acuity and peripheral vision intact b/l   ENT:  moist mucous membranes   NECK:  supple, symmetric   LUNGS:  Equal air entry bilaterally   CARDIOVASCULAR:  normal s1 / s2, RRR, distal pulses intact   ABDOMEN:  Soft, no rigidity   NEUROLOGIC:  Mental Status:  A & O x3,awake             Cranial Nerves:    II: Visual acuity:  normal  II: Visual fields:  normal  III: Pupils:  equal, round, reactive to light  III,IV,VI: Extra Ocular Movements: intact  V: Facial sensation:  intact  VII: Facial strength: intact  VIII: Hearing:  intact  IX: Palate:  intact  XI: Shoulder shrug:  intact  XII: Tongue movement:  normal     Motor Exam:    Drift:  absent  Tone:  normal     MOTOR:  RUE: 5/5  LUE: 5/5  RLE: 5/5  LLE: 5/5     Sensory:    Touch:    Right Upper Extremity:  normal  Left Upper Extremity:  normal  Right Lower Extremity:  normal  Left Lower Extremity:  normal     Deep Tendon Reflexes:       Right Knee:  2+  Left Knee:  2+     Plantar Response:  Right:  downgoing  Left:  downgoing     Clonus:  N/A     Coordination/Dysmetria:     Finger to Nose:   Right:  normal  Left:  normal          NIH Stroke Scale Total (if not done complete detailed one below):    1a.  Level of consciousness:  0 - alert; keenly responsive  1b. Level of consciousness questions:  0 - answers both questions correctly  1c. Level of consciousness questions:  0 - performs both tasks correctly  2. Best Gaze:  0 - normal  3. Visual:  0 - no visual loss  4. Facial Palsy:  0 - normal symmetric movement  5a. Motor left arm:  0 - no drift, limb holds 90 (or 45) degrees for full 10 seconds  5b. Motor right arm:  0 - no drift, limb holds 90 (or 45) degrees for full 10 seconds  6a. Motor left le - no drift; leg holds 30 degree position for full 5 seconds  6b. Motor right le - no drift; leg holds 30 degree position for full 5 seconds  7. Limb Ataxia:  0 - absent  8. Sensory:  0 - normal; no sensory loss  9. Best Language:  0 - no aphasia, normal  10. Dysarthria:  0 - normal  11.   Extinction and Inattention:  0 - no abnormality  NIH = 0    LABS AND IMAGING:     CBC with Differential:    Lab Results   Component Value Date    WBC 12.0 2022    RBC 3.83 2022    RBC 4.15 2012    HGB 13.1 2022    HCT 41.9 2022     2022     2012    .4 2022    MCH 34.2 2022    MCHC 31.3 2022    RDW 13.9 2022    NRBC 2 2019    LYMPHOPCT 18 2022    MONOPCT 8 2022    BASOPCT 0 2022    MONOSABS 1.00 2022    LYMPHSABS 2.15 2022    EOSABS 0.30 2022    BASOSABS <0.03 2022    DIFFTYPE NOT REPORTED 2022     BMP:    Lab Results   Component Value Date     2022    K 5.2 2022     2022    CO2 20 2022    BUN 14 2022    LABALBU 3.9 2022    LABALBU 4.4 2011    CREATININE 0.60 2022    CALCIUM 9.1 2022    GFRAA >60 2022    LABGLOM >60 2022    GLUCOSE 109 2022    GLUCOSE 76 2012       Radiology Review:  5mm left basal ganglia hemorrhage      ASSESSMENT AND PLAN:       Patient Active Problem List   Diagnosis    Essential hypertension    Hepatitis C    History of substance abuse ([facility]    Chronic heart failure with reduced ejection fraction and diastolic dysfunction (HCC)    Alcoholic cirrhosis of liver without ascites (HCC)    PUD (peptic ulcer disease)    Anemia    Duodenal ulcer with hemorrhage    Psychosis, intermittent requring antipsychotic meds    Opiate withdrawal (HCC)    Pneumonia    Pancreas head lesion    Polysubstance abuse ([facility]    Right flank pain    Avascular necrosis of bone of right hip (HCC)    Closed fracture of head of right femur (HCC)    COPD without exacerbation (HCC)    Hypokalemia    History of hepatitis C    Avascular necrosis (HCC)    History of total right hip replacement    Protein calorie malnutrition (HCC)    Chronic diastolic heart failure (HCC)    Right sided weakness    Chronic daily headache    Memory changes    Generalized weakness    Stroke with cerebral ischemia (HCC)    SOB (shortness of breath)    Avascular necrosis of left femur (HCC)    Pulmonary embolism on right (HCC)    Multiple subsegmental pulmonary emboli without acute cor pulmonale (HCC)    Acute on chronic blood loss anemia    Severe comorbid illness    Primary osteoarthritis of left hip    Chronic anticoagulation    Chronic idiopathic constipation    DDD (degenerative disc disease), lumbar    Medication management    Pancreatic mass    Homeless    Colitis    Epigastric pain    Right-sided chest wall pain    CRYSTAL (acute kidney injury) ([address]    H/O malignant gastrointestinal stromal tumor (GIST)    Gastrointestinal stromal tumor (GIST) (HCC)    Hypoglycemia    History of pulmonary embolism    Noncompliance    Crack cocaine use    Moderate episode of recurrent major depressive disorder (HCC)    Atelectasis    Abdominal pain    Migraine    Migraine variants, not intractable    Pain of right hip joint    Arthritis    Hip arthritis    Migraine aura without headache    Social problem    Soft tissue tumor, malignant ([address]    Unable to care for self    Intraparenchymal hemorrhage of brain ([address]    Basal ganglia hemorrhage (HCC)         A/P:  This is a 69 y.o. female on Plavix and Eliquis with left basal ganglia hemorrhage. No focal deficits. NIH of 0.     Patient care will be discussed with attending, will reevaluate patient along with attending     - No neurosurgical interventions planned for now  - No reversal with Alvina Mcduffie due to patient stability and risk of clotting   - Obtain CT/CTA head in AM   - Neuro checks per protocol  - Hold all antiplatelets and anticoagulants      Additional recommendations may follow    Please contact neurosurgery with any changes in patients neurologic status. Thank you for your consult.        Ania Balderas MD   NS pager [phone]  4/21/2022  7:51 AM

## 2022-04-21 NOTE — ED PROVIDER NOTES
EMERGENCY DEPARTMENT ENCOUNTER    Pt Name: Giancarlo Brown  MRN: [id number]  [place] 1955  Date of evaluation: 4/21/22  CHIEF COMPLAINT       Chief Complaint   Patient presents with    Altered Mental Status     HISTORY OF PRESENT ILLNESS   64-year-old female presents emergency room for altered mental status from nursing facility. Patient has history of substance misuse and alcohol use however is currently staying in the nursing facility. She reportedly had went out to dinner for her birthday tonight and when she returned she had an episode of unresponsiveness. Patient was given Narcan. Patient does not have any history of trauma that we are aware of. She is a very poor historian.           REVIEW OF SYSTEMS     Review of Systems   Unable to perform ROS: Mental status change       PASTMEDICAL HISTORY     Past Medical History:   Diagnosis Date    Appendicitis 3/27/2017    Cerebral artery occlusion with cerebral infarction ([address]     CHF (congestive heart failure) (HCC)     Chronic respiratory failure with hypoxia and hypercapnia (HCC)     Chronic rhinitis     Cigarette smoker     COPD (chronic obstructive pulmonary disease) ([address] 8/5/2018    Depression     Dysphagia     Essential hypertension 8/11/2018    GERD (gastroesophageal reflux disease)     Heart failure, diastolic, with acute decompensation ([address] 9/5/2018    Hepatitis C, chronic (HCC)     History of smoking at least 1 pack per day for at least 30 years     Hyperlipidemia     Hypertension     Migraine     Moderate COPD (chronic obstructive pulmonary disease) (HCC)     Multiple transfusions     Neurogenic dysphagia     Osteoarthritis     hands    Pneumonia     Scoliosis     Substance abuse ([address]     ivda and cocaine abuse in past     Past Problem List  Patient Active Problem List   Diagnosis Code    Essential hypertension I10    Hepatitis C B19.20    History of substance abuse ([address] U24.71    Alcoholic cirrhosis of liver without ascites (HCC) K70.30    PUD (peptic ulcer disease) K27.9    Anemia, macrocytic D53.9    Duodenal ulcer with hemorrhage K26.4    Psychosis, intermittent requring antipsychotic meds F29    Opiate withdrawal (HCC) F11.23    Pneumonia J18.9    Pancreas head lesion S36.200A    Polysubstance abuse (HCC) F19.10    Right flank pain R10.9    Avascular necrosis of bone of right hip (HCC) M87.051    Closed fracture of head of right femur (HCC) S72.051A    COPD without exacerbation (HCC) J44.9    Hypokalemia E87.6    History of hepatitis C Z86.19    Avascular necrosis (HCC) M87.00    History of total right hip replacement Z96.641    Protein calorie malnutrition (HCC) E46    Chronic diastolic heart failure (HCC) I50.32    Right sided weakness R53.1    Chronic daily headache R51.9    Memory changes R41.3    Generalized weakness R53.1    Stroke with cerebral ischemia ([facility]) I63.9    SOB (shortness of breath) R06.02    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.052    Pulmonary embolism on right ([facility]) I26.99    Multiple subsegmental pulmonary emboli without acute cor pulmonale ([facility]) I26.94    Acute on chronic blood loss anemia D62    Severe comorbid illness R69    Primary osteoarthritis of left hip M16.12    Chronic anticoagulation Z79.01    Chronic idiopathic constipation K59.04    DDD (degenerative disc disease), lumbar M51.36    Medication management Z79.899    Pancreatic mass K86.89    Homeless Z59.00    Colitis K52.9    Epigastric pain R10.13    Right-sided chest wall pain R07.89    CRYSTAL (acute kidney injury) (HCC) N17.9    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 A0    Hypoglycemia E16.2    History of pulmonary embolism Z86.711    Noncompliance Z91.19    Crack cocaine use F14.90    Moderate episode of recurrent major depressive disorder (HCC) F33.1    Atelectasis J98.11    Abdominal pain R10.9    Migraine G43.909    Migraine variants, not intractable G43.809    Pain of right hip joint M25.551    Arthritis M19.90    Hip arthritis M16.10    Migraine aura without headache G43. 109    Social problem Z65.9    Soft tissue tumor, malignant (HCC) C49.9    Unable to care for self Z78.9    Intraparenchymal hemorrhage of brain (HCC) I61.9    Basal ganglia hemorrhage (HCC) I61.0    History of blood clots:  On Eliquis Z86.718     SURGICAL HISTORY       Past Surgical History:   Procedure Laterality Date    APPENDECTOMY  2017     SECTION      GASTRIC BYPASS SURGERY      HIP SURGERY Right 2019    HIP TOTAL ARTHROPLASTY ANTERIOR APPROACH - [facility],  C-ARM, (Right )    LAPAROSCOPIC APPENDECTOMY N/A 3/27/2017    APPENDECTOMY LAPAROSCOPIC performed by Elizabeth Landis MD at [facility]      hgsil    MEDICATION INJECTION Left 3/25/2021    STEROID INJECTION LEFT HIP  WITH FLUORO performed by Melody Anguiano MD at [email or web address] Left 2021    INJECTION STEROID LEFT HIP WITH FLUORO- ATTEMPTED performed by Melody Anguiano MD at [address] Left 2021    INJECTION STEROID LEFT HIP performed by Melody Anguiano MD at [address] Left 2021    hip injection    MN COLONOSCOPY W/BIOPSY SINGLE/MULTIPLE N/A 11/10/2018    COLONOSCOPY WITH BIOPSY performed by Doris Mariano MD at [facility]    MN EGD TRANSORAL BIOPSY SINGLE/MULTIPLE N/A 2018    EGD ESOPHAGOGASTRODUODENOSCOPY performed by Lina Hou MD at [facility] Right 2019    HIP TOTAL ARTHROPLASTY ANTERIOR APPROACH - MEDACTVISHAL,  C-ARM, performed by Jumana Rivas DO at [facility]      oesophagitis, candidiasis    UPPER GASTROINTESTINAL ENDOSCOPY  2018    UPPER GASTROINTESTINAL ENDOSCOPY N/A 2018    EGD CONTROL HEMORRHAGE performed by Lina Hou MD at [address] [place] [address] UPPER GASTROINTESTINAL ENDOSCOPY N/A 4/5/2021    EGD W/EUS FNA performed by Becki Garibay MD at [place] [address]       Discharge Medication List as of 4/21/2022  2:00 AM      CONTINUE these medications which have NOT CHANGED    Details   lidocaine 4 % external patch Place 1 patch onto the skin daily, TransDERmal, DAILY Starting Wed 3/9/2022, Disp-14 patch, R-0, Print      ondansetron (ZOFRAN-ODT) 8 MG TBDP disintegrating tablet Historical Med      mirtazapine (REMERON) 30 MG tablet Historical Med      famotidine (PEPCID) 20 MG tablet Historical Med      ICY HOT LIDOCAINE PLUS MENTHOL 4-1 % PTCH DAWHistorical Med      imatinib (GLEEVEC) 400 MG chemo tablet Historical Med      gabapentin (NEURONTIN) 300 MG capsule Historical Med      butalbital-acetaminophen-caffeine (FIORICET, ESGIC) -40 MG per tablet Take 1 tablet by mouth every 12 hours for 14 days, Disp-28 tablet, R-0Normal      ferrous sulfate (FE TABS 325) 325 (65 Fe) MG EC tablet Take 1 tablet by mouth daily, Disp-90 tablet, R-3Normal      acetaminophen (TYLENOL) 500 MG tablet Take 2 tablets by mouth every 6 hours as needed for Pain, Disp-40 tablet, R-0Print      mometasone-formoterol (DULERA) 100-5 MCG/ACT inhaler Inhale 2 puffs into the lungs 2 times daily, Disp-1 Inhaler, R-1Normal      clopidogrel (PLAVIX) 75 MG tablet Take 1 tablet by mouth daily, Disp-30 tablet, R-3Normal      carvedilol (COREG) 6.25 MG tablet Take 1 tablet by mouth 2 times daily (with meals), Disp-60 tablet, R-3Normal      atorvastatin (LIPITOR) 40 MG tablet Take 1 tablet by mouth nightly, Disp-30 tablet, R-3Normal      amitriptyline (ELAVIL) 10 MG tablet Take 1 tablet by mouth nightly, Disp-30 tablet, R-3Normal      albuterol sulfate HFA (PROVENTIL HFA) 108 (90 Base) MCG/ACT inhaler Inhale 1-2 puffs into the lungs once for 1 dose, Disp-1 Inhaler, R-0Normal      lisinopril (PRINIVIL;ZESTRIL) 10 MG tablet Take 1 tablet by mouth daily, Disp-30 tablet, R-0Normal      apixaban (ELIQUIS) 5 MG TABS tablet Take 1 tablet by mouth 2 times daily, Disp-60 tablet, R-3Normal      Misc. Devices [facility]) MISC ONCE Starting Mon 2019, Disp-1 each, R-0, Print      ipratropium-albuterol (DUONEB) 0.5-2.5 (3) MG/3ML SOLN nebulizer solution Inhale 3 mLs into the lungs every 4 hours as needed for Shortness of Breath, Disp-360 mL, R-5Normal      vitamin B-1 100 MG tablet Take 1 tablet by mouth daily, Disp-30 tablet, R-3Normal      vitamin B-12 250 MCG tablet Take 1 tablet by mouth daily, Disp-30 tablet, R-3Normal      Multiple Vitamins-Minerals (THERAPEUTIC MULTIVITAMIN-MINERALS) tablet Take 1 tablet by mouth daily, Disp-30 tablet, R-3Normal           ALLERGIES     is allergic to aspirin and keflet [cephalexin]. FAMILY HISTORY     She indicated that her mother is . She indicated that her father is . SOCIAL HISTORY       Social History     Tobacco Use    Smoking status: Current Some Day Smoker     Packs/day: 0.50     Years: 40.00     Pack years: 20.00     Types: Cigarettes     Start date:      Last attempt to quit: 2018     Years since quitting: 3.4    Smokeless tobacco: Never Used   Vaping Use    Vaping Use: Never used   Substance Use Topics    Alcohol use: No     Alcohol/week: 0.0 standard drinks    Drug use: No     Types: IV, Cocaine     Comment: past history     PHYSICAL EXAM     INITIAL VITALS: BP (!) 150/85   Pulse 74   Temp 98.6 °F (37 °C) (Oral)   Resp 16   Ht 4' 11\" (1.499 m)   Wt 100 lb (45.4 kg)   LMP 2015   SpO2 100%   BMI 20.20 kg/m²    Physical Exam  Constitutional:       General: She is not in acute distress. Appearance: She is well-developed. HENT:      Head: Normocephalic. Eyes:      Pupils: Pupils are equal, round, and reactive to light. Cardiovascular:      Rate and Rhythm: Normal rate and regular rhythm. Heart sounds: Normal heart sounds.    Pulmonary:      Effort: Pulmonary effort is normal. No respiratory distress. Breath sounds: Normal breath sounds. Abdominal:      General: Bowel sounds are normal.      Palpations: Abdomen is soft. Tenderness: There is no abdominal tenderness. Musculoskeletal:         General: Normal range of motion. Skin:     General: Skin is warm and dry. Neurological:      Mental Status: She is confused. Comments: Patient is answering some questions appropriately. She is aware she is in the hospital.  She is unclear memory of tonight. MEDICAL DECISION MAKIN-year-old female presenting to the emergency room with mental status change. Patient has intact airway and is communicating appropriately. She does not have any focal neurologic deficits. CT head shows left basal ganglia bleed. Plan is for transfer to [place] for neurosurgical evaluation. ED Course as of 22 1317   Thu 2022   0156 I was alerted of basal ganglia bleed on the left side by radiology. Patient will require neurosurgical evaluation. Case was discussed with Dr. Sunni Day at [place] who will accept. [EG]      ED Course User Index  [EG] Ricardo Garcia MD       CRITICAL CARE:   Due to the high probability of sudden and clinically significant deterioration in the patient's condition she required highest level of my preparedness to intervene urgently. I provided critical care time including documentation time, medication orders and management, reevaluation, vital sign assessment, ordering and reviewing of of lab tests ordering and reviewing of x-ray studies, and admission orders. Aggregate critical care time is 30 minutes including only time during which I was engaged in work directly related to her care and did not include time spent treating other patients simultaneously.        PROCEDURES:    Procedures    DIAGNOSTIC RESULTS   EKG:All EKG's are interpreted by the Emergency Department Physician who either signs or Co-signs this chart in the absence of a cardiologist.    EKG-  Sinus rhythm ventricular rate 83  No significant ST changes  T wave inversion V5 V6  Possible left atrial enlargement    QTc 448    RADIOLOGY:All plain film, CT, MRI, and formal ultrasound images (except ED bedside ultrasound) are read by the radiologist, see reports below, unless otherwisenoted in MDM or here. CT Head WO Contrast   Final Result   5 mm left basal ganglial hemorrhage. Otherwise chronic microvascular disease   and involutional changes. Critical results were called by Dr. Carmina Perry to [facility] on 4/21/2022 at   01:06. LABS: All lab results were reviewed by myself, and all abnormals are listed below. Labs Reviewed   CBC WITH AUTO DIFFERENTIAL - Abnormal; Notable for the following components:       Result Value    WBC 12.0 (*)     RBC 3.83 (*)     .4 (*)     MCH 34.2 (*)     Platelets 792 (*)     Seg Neutrophils 71 (*)     Lymphocytes 18 (*)     Segs Absolute 8.46 (*)     All other components within normal limits   COMPREHENSIVE METABOLIC PANEL W/ REFLEX TO MG FOR LOW K - Abnormal; Notable for the following components:    Glucose 109 (*)     Bun/Cre Ratio 23 (*)     Alkaline Phosphatase 139 (*)     ALT 41 (*)     AST 57 (*)     All other components within normal limits   SPECIMEN REJECTION   ETHANOL   TROPONIN   APTT   PROTIME-INR       EMERGENCY DEPARTMENTCOURSE:         Vitals:    Vitals:    04/20/22 2321 04/21/22 0010 04/21/22 0031 04/21/22 0056   BP:       Pulse: 84 75 72 74   Resp: 21 18 18 16   Temp:       TempSrc:       SpO2: 98% 98% 97% 100%   Weight:       Height:           The patient was given the following medications while in the emergency department:  No orders of the defined types were placed in this encounter. CONSULTS:  None    FINAL IMPRESSION      1. Altered mental status, unspecified altered mental status type    2.  Basal ganglia hemorrhage ([facility])          DISPOSITION/PLAN   DISPOSITION Decision To Transfer 04/21/2022 01:48:49 AM      PATIENT REFERRED TO:  No follow-up provider specified. DISCHARGE MEDICATIONS:  Discharge Medication List as of 4/21/2022  2:00 AM        Lety Neri MD  Attending Emergency Physician      Care during this encounter was due to an unprecedented national emergency due to COVID-19.            Sonali Pimentel MD  04/21/22 0159       Sonali Pimentel MD  04/21/22 0200       Sonali Pimentel MD  04/21/22 1735 Enrique Huerta MD  04/23/22 3854

## 2022-04-21 NOTE — PROGRESS NOTES
Patient has arrived to unit at this time. Vitals taken. Patient is refusing assessment at this time swearing, and swatting arms at [place].

## 2022-04-21 NOTE — CARE COORDINATION
PHQ-9  Pt presents with AMS  Met with pt this date to assess for support and needs. Pt states she is presently at [facility] , has been there for approx 3 weeks. Pt states she has 3 sons ( 2 ) and 7 grandchildren   Pt denies having any feelings of depression or suicidal ideations. Patient Health Questionnaire-9 (PHQ-9)    Over the last 2 weeks, how often have you been bothered by any of the following problems? 1. Little Interest or pleasure in doing things? [x] Not at all  [] Several Days  [] More than half the day  []  Nearly every day    2. Feeling down, depressed or hopeless? [x] Not at all  [] Several Days  [] More than half the day  []  Nearly every day    3. Trouble falling or staying asleep, or sleeping too much? [x] Not at all  [] Several Days  [] More than half the day  []  Nearly every day    4. Feeling tired or having little energy? [x] Not at all  [] Several Days  [] More than half the day  []  Nearly every day    5. Poor apettite or overeating? [x] Not at all  [] Several Days  [] More than half the day  []  Nearly every day    6. Feeling bad about yourself-or that you are a failure or have let yourself or your family down? [x] Not at all  [] Several Days  [] More than half the day  []  Nearly every day    7. Trouble concentrating on things, such as reading the newspaper or watching television? [x] Not at all  [] Several Days  [] More than half the day  []  Nearly every day    8. Moving or speaking so slowly that other people could have noticed? Or the opposite-being so fidgety or restless that you have been moving around a lot more than usual?   [x] Not at all  [] Several Days  [] More than half the day  []  Nearly every day    9. Thoughts that you would be better off dead or of hurting yourself in some way?    [x] Not at all  [] Several Days  [] More than half the day  []  Nearly every day    Total Score: 0    If you checked off any problems, how difficult have these

## 2022-04-21 NOTE — ED NOTES
Patient is adamant she had glasses, phone and a coat when she arrived at [address] from [address] states patient did NOT have any of those items when EMS brought her in  Patient will be informed      Lethea Osler, RN  04/21/22 1575

## 2022-04-21 NOTE — ED PROVIDER NOTES
[facility]     Emergency Department     Faculty Attestation    I performed a history and physical examination of the patient and discussed management with the resident. I have reviewed and agree with the residents findings including all diagnostic interpretations, and treatment plans as written. Any areas of disagreement are noted on the chart. I was personally present for the key portions of any procedures. I have documented in the chart those procedures where I was not present during the key portions. I have reviewed the emergency nurses triage note. I agree with the chief complaint, past medical history, past surgical history, allergies, medications, social and family history as documented unless otherwise noted below. Documentation of the HPI, Physical Exam and Medical Decision Making performed by scribmio is based on my personal performance of the HPI, PE and MDM. For Physician Assistant/ Nurse Practitioner cases/documentation I have personally evaluated this patient and have completed at least one if not all key elements of the E/M (history, physical exam, and MDM). Additional findings are as noted. 70 yo F transfer from [facility] for ich, pt denies injury, or fall, denies pain at this time,   No cp, no abdominal pain, no nausea or vomiting,   pe afebrile, gcs 15, memory intact currently, albaro, no cervical tenderness, crepitus or deformity, abdomen non tender, no distension, no rigidity, no guarding,   No calf tenderness,      Neuro critical care, on eliquis,   admitted    EKG Interpretation    Interpreted by me  Normal sinus, heart rate 67, no ischemia, normal axis, QT corrected 422    CRITICAL CARE: There was a high probability of clinically significant/life threatening deterioration in this patient's condition which required my urgent intervention. Total critical care time was 10 minutes.   This excludes any time for separately reportable procedures.        [facility], DO  04/21/22 [facility], DO  04/21/22 [facility], DO  04/21/22 [facility], DO  04/21/22 1647

## 2022-04-21 NOTE — PROCEDURES
EEG REPORT       Patient: Emi Daniels Age: 69 y.o. MRN: [id number]      Referring Provider: No ref. provider found    History: This routine 30 minute scalp EEG was recorded with video- monitoring for a 69 y.o. [place] female who presented with encephalopathy. This EEG was performed to evaluate for focal and epileptiform abnormalities. Emi Daniels   Current Facility-Administered Medications   Medication Dose Route Frequency Provider Last Rate Last Admin    acetaminophen (TYLENOL) tablet 650 mg  650 mg Oral Q4H PRN Jovita Goldberg MD   650 mg at 04/21/22 0903    ondansetron (ZOFRAN-ODT) disintegrating tablet 4 mg  4 mg Oral Q8H PRN Jovita Goldberg MD        Or    ondansetron [facility]) injection 4 mg  4 mg IntraVENous Q6H PRN Jovita Goldberg MD        albuterol sulfate  (90 Base) MCG/ACT inhaler 1 puff  1 puff Inhalation Daily PRN Ivelisse Erickson MD        amitriptyline (ELAVIL) tablet 10 mg  10 mg Oral Nightly Ivelisse Erickson MD        atorvastatin (LIPITOR) tablet 40 mg  40 mg Oral Nightly Ivelisse Erickson MD        carvedilol (COREG) tablet 6.25 mg  6.25 mg Oral BID WC Ivelisse Erickson MD   6.25 mg at 04/21/22 0903    famotidine (PEPCID) tablet 20 mg  20 mg Oral Daily Ivelisse Erickson MD   20 mg at 04/21/22 0903    ipratropium-albuterol (DUONEB) nebulizer solution 3 mL  1 vial Inhalation Q4H PRN Ivelisse Erickson MD        oxyCODONE (ROXICODONE) immediate release tablet 5 mg  5 mg Oral Q4H PRN Ivelisse Erickson MD        Or    oxyCODONE (ROXICODONE) immediate release tablet 10 mg  10 mg Oral Q4H PRN Ivelisse Erickson MD   10 mg at 04/21/22 0940    labetalol (NORMODYNE;TRANDATE) injection 10 mg  10 mg IntraVENous Q1H PRN JAYDON Sutton - CNP           Technical Description: This is a 21 channel digital EEG recording with time-locked video. Electrodes were placed in accordance with the 10-20 International System of Electrode Placement.  Single lead EKG monitoring as well as temporal electrodes were included. The patient was not sleep deprived. This recording was obtained during wakefulness. EEG Description: The dominant background activity during maximal recorded wakefulness consisted of 6-7 Hz of polymorphic theta activity of 25-40 uV. There was poor anterior posterior amplitude gradient. There was no asymmetry between the two hemispheres. Sleep architecture was not seen. Activation procedures were not performed. The EKG channel demonstrated a normal sinus rhythm. Interpretation  This EEG was abnormal due to disorganized and slow background in polymorphic theta frequency. Clinical correlation  This EEG was abnormal. Disorganized and slow background suggested mild to moderate encephalopathy of non specific etiology.      Judith Dean MD  Diplomate, American Board of Psychiatry and Neurology  Diplomate, American Board of Clinical Neurophysiology  Diplomate, American Board of Epilepsy

## 2022-04-21 NOTE — Clinical Note
Discharge Plan[de-identified] Home with Office Follow-up   Telemetry/Cardiac Monitoring Required?: Yes   Bed request comments: LAURI Tellez

## 2022-04-21 NOTE — PLAN OF CARE
DATE: 04/21/22    AWAKE & FOLLOWING COMMANDS:  [] No   [x] Yes    INTUBATED:   [x] No   [] Yes    SEDATION/ANALGESIA:    [] Propofol gtt  [] Precedex gtt [] Versed gtt   [x] No Sedation or Not Applicable  Pain medications:     VASOPRESSORS:  [x] No    [] Yes  [] Levophed [] Dopamine [] Vasopressin  [] Dobutamine [] Phenylephrine [] Epinephrine    CENTRAL LINES:  [x] No    [] Yes:      TORRES CATHETER: [x] No    [] Yes:      FEEDING: Able to take PO?  [] No:  [] NG/OG [] PEG  [] NPO for:   [] Tube Feeds    [x] Yes:  Diet: Regular diet    DVT Prophylaxis:  [] Lovenox   [] Heparin subQ   [] Anticoagulation   [x] Contraindicated secondary to: ICH    Stress Ulcer Prophylaxis:  [] PPI  [x] H2 Receptor Blocker  [] Not indicated    Blood Glucose:  [x] Blood glucose <180 consistently  [] Insulin sliding scale   [] Home Medications addressed    HOB >30 Degrees:  [x] Yes  [] No, contraindicated due to    JAYDON Bell - CNP

## 2022-04-21 NOTE — H&P
Neuro ICU History & Physical    Patient Name: Wilian Daniels  Patient : 1955  Room/Bed:   Code Status: Prior  Allergies: Allergies   Allergen Reactions    Aspirin Itching    Keflet [Cephalexin]      Tolerated penicillin V , tolerates cephalexin        CHIEF COMPLAINT     Altered mental status    HPI    History Obtained From: Patient and EMR    The patient is a 69 y.o. female presented with altered mental status from nursing facility to [facility] in [place]. Patient reportedly on Plavix for history of cardiac stent, as well as Eliquis for history of PE. Patient was reportedly out to dinner for her birthday and had an episode of unresponsiveness thereafter. Evaluation in the ED there revealed 5 mm left basal ganglia hemorrhage. Patient is currently awake and alert and has no complaints at this time.       ICH score: 0    Admitted to ICU From: ED   Reason for ICU Admission: Neurochecks and BP monitoring       PATIENT HISTORY   Past Medical History:        Diagnosis Date    Appendicitis 3/27/2017    Cerebral artery occlusion with cerebral infarction ([address]     CHF (congestive heart failure) (HCC)     Chronic respiratory failure with hypoxia and hypercapnia (HCC)     Chronic rhinitis     Cigarette smoker     COPD (chronic obstructive pulmonary disease) ([address] 2018    Depression     Dysphagia     Essential hypertension 2018    GERD (gastroesophageal reflux disease)     Heart failure, diastolic, with acute decompensation ([address] 2018    Hepatitis C, chronic (HCC)     History of smoking at least 1 pack per day for at least 30 years     Hyperlipidemia     Hypertension     Migraine     Moderate COPD (chronic obstructive pulmonary disease) (HCC)     Multiple transfusions     Neurogenic dysphagia     Osteoarthritis     hands    Pneumonia     Scoliosis     Substance abuse ([address]     ivda and cocaine abuse in past       Past Surgical History:        Procedure Laterality Date  APPENDECTOMY  2017     SECTION      GASTRIC BYPASS SURGERY      HIP SURGERY Right 2019    HIP TOTAL ARTHROPLASTY ANTERIOR APPROACH - [facility],  C-ARM, (Right )    LAPAROSCOPIC APPENDECTOMY N/A 3/27/2017    APPENDECTOMY LAPAROSCOPIC performed by Helder Palmer MD at [address] [place]      hgsil    MEDICATION INJECTION Left 3/25/2021    STEROID INJECTION LEFT HIP  WITH FLUORO performed by Chase Sweeney MD at [address] Left 2021    INJECTION STEROID LEFT HIP WITH FLUORO- ATTEMPTED performed by Chase Sweeney MD at [address] Left 2021    INJECTION STEROID LEFT HIP performed by Chase Sweeney MD at [address] Left 2021    hip injection    MN COLONOSCOPY W/BIOPSY SINGLE/MULTIPLE N/A 11/10/2018    COLONOSCOPY WITH BIOPSY performed by Margarita Olea MD at [facility]    MN EGD TRANSORAL BIOPSY SINGLE/MULTIPLE N/A 2018    EGD ESOPHAGOGASTRODUODENOSCOPY performed by Constantino Dinh MD at [facility] Right 2019    HIP TOTAL ARTHROPLASTY ANTERIOR APPROACH - [facility],  C-ARM, performed by Geetha Curran DO at [address] [place] [address]      oesophagitis, candidiasis    UPPER GASTROINTESTINAL ENDOSCOPY  2018    UPPER GASTROINTESTINAL ENDOSCOPY N/A 2018    EGD CONTROL HEMORRHAGE performed by Constantino Dinh MD at [facility] N/A 2021    EGD W/EUS FNA performed by Shay Webb MD at [facility]       Social History:   Social History     Socioeconomic History    Marital status: Single     Spouse name: Not on file    Number of children: Not on file    Years of education: Not on file    Highest education level: Not on file   Occupational History     Employer: DISABLED   Tobacco Use    Smoking status: Current Some Day Smoker     Packs/day: 0.50     Years: 40.00     Pack years: 20.00 Types: Cigarettes     Start date: 5     Last attempt to quit: 11/2018     Years since quitting: 3.4    Smokeless tobacco: Never Used   Vaping Use    Vaping Use: Never used   Substance and Sexual Activity    Alcohol use: No     Alcohol/week: 0.0 standard drinks    Drug use: No     Types: IV, Cocaine     Comment: past history    Sexual activity: Not on file   Other Topics Concern    Not on file   Social History Narrative    Not on file     Social Determinants of Health     Financial Resource Strain:     Difficulty of Paying Living Expenses: Not on file   Food Insecurity:     Worried About Running Out of Food in the Last Year: Not on file    Toby of Food in the Last Year: Not on file   Transportation Needs:     Lack of Transportation (Medical): Not on file    Lack of Transportation (Non-Medical):  Not on file   Physical Activity:     Days of Exercise per Week: Not on file    Minutes of Exercise per Session: Not on file   Stress:     Feeling of Stress : Not on file   Social Connections:     Frequency of Communication with Friends and Family: Not on file    Frequency of Social Gatherings with Friends and Family: Not on file    Attends Jehovah's witness Services: Not on file    Active Member of [address] [place] [address] [employer] or Organizations: Not on file    Attends Club or Organization Meetings: Not on file    Marital Status: Not on file   Intimate Partner Violence:     Fear of Current or Ex-Partner: Not on file    Emotionally Abused: Not on file    Physically Abused: Not on file    Sexually Abused: Not on file   Housing Stability:     Unable to Pay for Housing in the Last Year: Not on file    Number of [place] in the Last Year: Not on file    Unstable Housing in the Last Year: Not on file       Family History:       Problem Relation Age of Onset    Breast Cancer Mother     Heart Disease Father        Allergies:    Aspirin and Nena Reil [cephalexin]    Medications Prior to Admission:    Not in a hospital admission. Current Medications:  No current facility-administered medications for this encounter. REVIEW OF SYSTEMS     CONSTITUTIONAL: negative for fatigue   EYES: negative for double vision, blurry vision, and photophobia    HEENT: negative for tinnitus and sore throat   RESPIRATORY: negative for cough, shortness of breath   CARDIOVASCULAR: negative for chest pain, palpitations, or syncope   GASTROINTESTINAL: negative for abdominal pain, nausea, vomiting   GENITOURINARY: negative for incontinence or retention    MUSCULOSKELETAL: negative for neck or back pain, negative for extremity pain   NEUROLOGICAL: Positive for AMS, Negative for seizures, headaches, weakness, numbness, aphasia, dysarthria    PSYCHIATRIC: negative for agitation, hallucination, SI/HI   SKIN Negative for spontaneous contusions, rashes, or lesions      PHYSICAL EXAM:     Temp 98.1 °F (36.7 °C) (Oral)   LMP 03/18/2015     PHYSICAL EXAM:  CONSTITUTIONAL:  Well developed, well nourished, alert and oriented x 3, in no acute distress. GCS 15. Nontoxic. No dysarthria. No aphasia. HEAD:  normocephalic, atraumatic    EYES:  PERRLA, EOMI.  Visual acuity and peripheral vision intact b/l   ENT:  moist mucous membranes   NECK:  supple, symmetric   LUNGS:  Equal air entry bilaterally   CARDIOVASCULAR:  normal s1 / s2, RRR, distal pulses intact   ABDOMEN:  Soft, no rigidity   NEUROLOGIC:  Mental Status:  A & O x3,awake             Cranial Nerves:    II: Visual acuity:  normal  II: Visual fields:  normal  III: Pupils:  equal, round, reactive to light  III,IV,VI: Extra Ocular Movements: intact  V: Facial sensation:  intact  VII: Facial strength: intact  VIII: Hearing:  intact  IX: Palate:  intact  XI: Shoulder shrug:  intact  XII: Tongue movement:  normal    Motor Exam:    Drift:  absent  Tone:  normal    MOTOR:  RUE: 5/5  LUE: 5/5  RLE: 5/5  LLE: 5/5    Sensory:    Touch:    Right Upper Extremity:  normal  Left Upper Extremity:  normal  Right Lower Extremity:  normal  Left Lower Extremity:  normal    Deep Tendon Reflexes:      Right Knee:  2+  Left Knee:  2+    Plantar Response:  Right:  downgoing  Left:  downgoing    Clonus:  N/A    Coordination/Dysmetria:    Finger to Nose:   Right:  normal  Left:  normal        NIH Stroke Scale Total (if not done complete detailed one below):    1a.  Level of consciousness:  0 - alert; keenly responsive  1b. Level of consciousness questions:  0 - answers both questions correctly  1c. Level of consciousness questions:  0 - performs both tasks correctly  2. Best Gaze:  0 - normal  3. Visual:  0 - no visual loss  4. Facial Palsy:  0 - normal symmetric movement  5a. Motor left arm:  0 - no drift, limb holds 90 (or 45) degrees for full 10 seconds  5b. Motor right arm:  0 - no drift, limb holds 90 (or 45) degrees for full 10 seconds  6a. Motor left le - no drift; leg holds 30 degree position for full 5 seconds  6b. Motor right le - no drift; leg holds 30 degree position for full 5 seconds  7. Limb Ataxia:  0 - absent  8. Sensory:  0 - normal; no sensory loss  9. Best Language:  0 - no aphasia, normal  10. Dysarthria:  0 - normal  11.   Extinction and Inattention:  0 - no abnormality  NIH = 0    LABS AND IMAGING:     RECENT LABS:  CBC with Differential:    Lab Results   Component Value Date    WBC 12.0 2022    RBC 3.83 2022    RBC 4.15 2012    HGB 13.1 2022    HCT 41.9 2022     2022     2012    .4 2022    MCH 34.2 2022    MCHC 31.3 2022    RDW 13.9 2022    NRBC 2 2019    LYMPHOPCT 18 2022    MONOPCT 8 2022    BASOPCT 0 2022    MONOSABS 1.00 2022    LYMPHSABS 2.15 2022    EOSABS 0.30 2022    BASOSABS <0.03 2022    DIFFTYPE NOT REPORTED 2022     BMP:    Lab Results   Component Value Date     2022    K 5.2 2022     2022    CO2 20 2022    BUN 14 2022    LABALBU 3.9 2022    LABALBU 4.4 2011    CREATININE 0.60 2022    CALCIUM 9.1 2022    GFRAA >60 2022    LABGLOM >60 2022    GLUCOSE 109 2022    GLUCOSE 76 2012       RADIOLOGY:  XR CHEST PORTABLE    (Results Pending)             Labs and Images reviewed with:    [x] Thuan Alvarado MD        ASSESSMENT AND PLAN:         ASSESSMENT:     This is a 69 y.o. female with altered mental status and findings of a 5 mm hemorrhage in the left basal ganglia. NIH is 0. Dose of Eliquis was at 10 PM.  Given patient's stability and risks of clotting due to her history of PE, no Kcentra will be given for reversal of Eliquis. Will admit to neuro ICU for neurochecks and blood pressure monitoring    Patient care will be discussed with attending, will reevaluate patient along with attending. PLAN/MEDICAL DECISION MAKING:    NEUROLOGIC:  - Imaging repeat CT head at 6-hour darinel  - AEDs Keppra  - Goal SBP <140mmHg    CARDIOVASCULAR:  BP Range: Systolic (77SFP), KEX:249 , Min:150 , PKU:761     Diastolic (48PBA), GIB:42, Min:85, Max:85    Pulse Range: Pulse  Av.3  Min: 72  Max: 84  - Goal SBP <140mmHg    PULMONARY:  Respiration Range: Resp  Av.3  Min: 16  Max: 21  Current Pulse Ox:    24HR Pulse Ox Range: SpO2  Av.2 %  Min: 93 %  Max: 100 %      RENAL/FLUID/ELECTROLYTE:  - BUN 14/ Creatinine 0.6  - I/O: No intake/output data recorded.       GI/NUTRITION:  NUTRITION:  No diet orders on file  - Bowel regimen: NA  - GI prophylaxis: NA    ID:  Tmax: Temp (24hrs), Av.4 °F (36.9 °C), Min:98.1 °F (36.7 °C), Max:98.6 °F (37 °C)    Temperature Range: Temp: 98.1 °F (36.7 °C) Temp  Av.4 °F (36.9 °C)  Min: 98.1 °F (36.7 °C)  Max: 98.6 °F (37 °C)  - WBC   Lab Results   Component Value Date    WBC 12.0 (H) 2022     - Antimicrobials: not indicated     HEME:   Recent Labs     22  2353   HGB 13.1     - Platelets 710    ENDOCRINE:  - Continue to monitor blood glucose, goal <180  - glucose controlled - most recent BGL is   Recent Labs     04/21/22  0127   GLUCOSE 109*       OTHER:  - PT/OT/ST when appropriate  - Code Status: Prior    PROPHYLAXIS:  Stress ulcer: NA    DVT PROPHYLAXIS:  - SCD sleeves - Thigh High   - Holding anticoagulation 2/2 bleeding risk      Daiana Solano MD  Neuro Critical Care Service   Pager [phone]  4/21/2022     2:49 AM

## 2022-04-21 NOTE — ACP (ADVANCE CARE PLANNING)
..Advance Care Planning     Advance Care Planning Activator (Inpatient)  Conversation Note      Date of ACP Conversation: 4/21/2022     Conversation Conducted with: Patient with Decision Making Capacity    ACP Activator: Gaetano Morton, [address] :     Current Designated Health Care Decision Maker:     Primary Decision Maker (Active): Lieutenant Juarez Lassiter - [phone]    Today we documented Decision Maker(s) consistent with Legal Next of Kin hierarchy. Care Preferences    Ventilation: \"If you were in your present state of health and suddenly became very ill and were unable to breathe on your own, what would your preference be about the use of a ventilator (breathing machine) if it were available to you? \"      Would the patient desire the use of ventilator (breathing machine)?: yes    \"If your health worsens and it becomes clear that your chance of recovery is unlikely, what would your preference be about the use of a ventilator (breathing machine) if it were available to you? \"     Would the patient desire the use of ventilator (breathing machine)?: Yes      Resuscitation  \"CPR works best to restart the heart when there is a sudden event, like a heart attack, in someone who is otherwise healthy. Unfortunately, CPR does not typically restart the heart for people who have serious health conditions or who are very sick. \"    \"In the event your heart stopped as a result of an underlying serious health condition, would you want attempts to be made to restart your heart (answer \"yes\" for attempt to resuscitate) or would you prefer a natural death (answer \"no\" for do not attempt to resuscitate)? \" yes       [] Yes   [] No   Educated Patient / Tapan Garcias regarding differences between Advance Directives and portable DNR orders.     Length of ACP Conversation in minutes:      Conversation Outcomes:  [x] ACP discussion completed  [] Existing advance directive reviewed with patient; no changes to patient's previously recorded wishes  [] New Advance Directive completed  [] Portable Do Not Rescitate prepared for Provider review and signature  [] POLST/POST/MOLST/MOST prepared for Provider review and signature      Follow-up plan:    [] Schedule follow-up conversation to continue planning  [] Referred individual to Provider for additional questions/concerns   [] Advised patient/agent/surrogate to review completed ACP document and update if needed with changes in condition, patient preferences or care setting    [] This note routed to one or more involved healthcare providers

## 2022-04-21 NOTE — ED NOTES
RN attempts to call report. Left on hold >6 minutes. Will call back.      Lara Pizano RN  04/21/22 [id number]

## 2022-04-21 NOTE — ED NOTES
The following labs obtained, labeled with pt sticker and tubed to lab: by me    [] Blue     [] Shivaniileana Landeros   [] on ice  [x] Green/yellow  [] Green/black [] on ice  [] Yellow  [] Red  [] Pink      [] COVID-19 swab    [] Rapid  [] PCR  [] Flu swab  [] Peds Viral Panel     [] Urine Sample  [] Pelvic Cultures  [] Blood Cultures            Zainab Abdul RN  04/21/22 [id number]

## 2022-04-21 NOTE — ED NOTES
Patient arrived to room 18 on [facility] stretcher from [facility]  Patient is alert and oriented  Patient went out to dinner with family for her birthday and then she was taken back to [facility]  She was found to have an altered mental status and EMS was called  EMS gave Narcan due to pinpoint pupils  Report received indicates a 5mm left basal ganglial hemorrhage   Unknown LKW, or if any falls or trauma  Patient is talkative, but does not recall events  No lateralizing weakness, no facial droop, no slurred speech noted  Side rails up x2, call light at side, atient placed on cardiac monitor, BP cuff and pulse ox. Alarms set.        Eduin Nugent RN  04/21/22 [id number]

## 2022-04-21 NOTE — ED NOTES
Report given to Kiki RODRIGES  No change in patient status  Continues to rest quietly       Johanna Saleh, ANTELMO  04/21/22 [id number]

## 2022-04-21 NOTE — PLAN OF CARE
Problem: Discharge Planning  Goal: Discharge to home or other facility with appropriate resources  Outcome: Progressing     Problem: Safety - Adult  Goal: Free from fall injury  Outcome: Progressing

## 2022-04-21 NOTE — PROGRESS NOTES
Physical Therapy  Facility/Department: [facility]  Physical Therapy Initial Assessment    Name: Nirav Polanco  : 1955  MRN: [id number]  Date of Service: 2022   Chief Complaint   Patient presents with    Cerebrovascular Accident     Unknown last time well, arrived [facility] with [facility]       Discharge Recommendations: Further therapy recommended at discharge. PT Equipment Recommendations  Equipment Needed: Yes (continue to assess needs pending pt progress)  Mobility Devices: Berneice Fairlee: Rolling      Patient Diagnosis(es): The encounter diagnosis was Intraparenchymal hemorrhage of brain ([address] Past Medical History:  has a past medical history of Appendicitis, Cerebral artery occlusion with cerebral infarction ([address] CHF (congestive heart failure) ([address] Chronic respiratory failure with hypoxia and hypercapnia (HCC), Chronic rhinitis, Cigarette smoker, COPD (chronic obstructive pulmonary disease) ([address] Depression, Dysphagia, Essential hypertension, GERD (gastroesophageal reflux disease), Heart failure, diastolic, with acute decompensation ([address] Hepatitis C, chronic ([address] History of smoking at least 1 pack per day for at least 30 years, Hyperlipidemia, Hypertension, Migraine, Moderate COPD (chronic obstructive pulmonary disease) ([address] Multiple transfusions, Neurogenic dysphagia, Osteoarthritis, Pneumonia, Scoliosis, and Substance abuse ([address] Past Surgical History:  has a past surgical history that includes  section; LEEP (); Upper gastrointestinal endoscopy (); Gastric bypass surgery; Appendectomy (2017); laparoscopic appendectomy (N/A, 3/27/2017); Upper gastrointestinal endoscopy (2018); Upper gastrointestinal endoscopy (N/A, 2018); pr colonoscopy w/biopsy single/multiple (N/A, 11/10/2018); pr egd transoral biopsy single/multiple (N/A, 2018); hip surgery (Right, 2019); Total hip arthroplasty (Right, 2019);  Medication Injection (Left, 3/25/2021); Upper gastrointestinal endoscopy (N/A, 4/5/2021); other surgical history (Left, 07/12/2021); Medication Injection (Left, 7/12/2021); and Medication Injection (Left, 7/12/2021). Assessment   Body Structures, Functions, Activity Limitations Requiring Skilled Therapeutic Intervention: Decreased functional mobility ; Decreased endurance; Increased pain;Decreased posture;Decreased balance;Decreased safe awareness;Decreased strength;Decreased ADL status  Assessment: The pt requires supervision for bed mobility this date, and CGA for transfers and ambulation of 10 ft with RW. The pt reports being independent with mobility prior to this admission and would benefit from continued physical therapy to adress deficits and promote increased independence.   Therapy Prognosis: Fair  Decision Making: Medium Complexity  Barriers to Learning: not receptive to education  Requires PT Follow-Up: Yes  Activity Tolerance  Activity Tolerance: Patient tolerated treatment well;Patient limited by pain     Plan   Plan  Plan:  (3-5x/week)  Current Treatment Recommendations: Strengthening,ROM,Balance training,Gait training,Stair training,Functional mobility training,Transfer training,ADL/Self-care training,Endurance training,Home exercise program,Safety education & training,Patient/Caregiver education & training,Equipment evaluation, education, & procurement  Safety Devices  Type of Devices: Patient at risk for falls,All fall risk precautions in place,Bed alarm in place,Call light within reach,Gait belt,Nurse notified,Left in bed  Restraints  Restraints Initially in Place: No     Restrictions  Restrictions/Precautions  Restrictions/Precautions: Fall Risk,General Precautions  Required Braces or Orthoses?: No  Position Activity Restriction  Other position/activity restrictions: up with assistance; SBP <140     Subjective   General  Patient assessed for rehabilitation services?: Yes  Response To Previous Treatment: Not applicable  Family / Caregiver Present: No  Follows Commands: Within Functional Limits  General Comment  Comments: Pt reamined on 4L O2 NC entire session. Subjective  Subjective: Pt and RN agreeable to physical therapy this date. Pt cooperative for most of session. Pt reports back and stomach pain throughout session 10/10. Pt performed ambulation and was repositioned, pt reports being comfortable at end of session. Social/Functional History  Social/Functional History  Lives With: Alone  Type of Home: Facility  Home Layout: One level  Home Access: Level entry  Receives Help From: Family (pt reports having some family close by.)  Additional Comments: Pt admitted from facility and was getting PT services, and reports plans to return to facility. Vision/Hearing  Hearing: Within functional limits    Cognition   Cognition  Overall Cognitive Status: Exceptions  Following Commands: Follows one step commands consistently  Safety Judgement: Decreased awareness of need for assistance;Decreased awareness of need for safety  Insights: Decreased awareness of deficits  Initiation: Requires cues for some  Sequencing: Does not require cues  Cognition Comment: pt was mildly agitated regarding performing mobility this date, but able to follow commands.      Objective      Observation/Palpation  Posture: Fair  Gross Assessment  AROM: Generally decreased, functional  Strength: Generally decreased, functional  Tone: Normal  Sensation: Intact     Joint Mobility  Spine: WFL  ROM RLE: WFL  ROM LLE: WFL  ROM RUE: WFL  ROM LUE: WFL  Strength RLE  Strength RLE: WFL  Comment: at least 4-/5 based on observed mobility  Strength LLE  Strength LLE: WFL  Comment: at least 4-/5 based on observed mobility  Strength RUE  Strength RUE: WFL  Comment: at least 3+/5 based on observed mobility  Strength LUE  Strength LUE: WFL  Comment: at least 3+/5 based on observed mobility  Strength Other  Other: Pt not agreeable to manual muscle testing at this time, reports doing therapy at facility and not needing additional therapy. Bed mobility  Rolling to Left: Supervision  Supine to Sit: Supervision  Sit to Supine: Supervision  Scooting: Supervision  Transfers  Sit to Stand: Contact guard assistance (performed 3x)  Stand to sit: Contact guard assistance  Bed to Chair: Contact guard assistance  Stand Pivot Transfers: Contact guard assistance  Lateral Transfers: Contact guard assistance  Comment: transfers performed with RW, min cues for management throughout  Ambulation  Surface: level tile  Device: Rolling Walker  Assistance: Contact guard assistance  Quality of Gait: flexed posture  Gait Deviations: Slow Geovanna; Increased NERY; Decreased step length;Decreased step height  Distance: 10 ft  Comments: pt slightly impulsive during ambulation to bedside commode, requires cues for RW management  More Ambulation?: No (pt declined d/t abdominal pain; RN notified)  Stairs/Curb  Stairs?: No     Balance  Posture: Fair  Sitting - Static: Fair;+  Sitting - Dynamic: Fair;+  Standing - Static: Fair;+  Standing - Dynamic: Fair;+  Comments: standing balance with RW           AM-PAC Score  AM-PAC Inpatient Mobility Raw Score : 20 (04/21/22 1516)  AM-PAC Inpatient T-Scale Score : 47.67 (04/21/22 1516)  Mobility Inpatient CMS 0-100% Score: 35.83 (04/21/22 1516)  Mobility Inpatient CMS G-Code Modifier : CJ (04/21/22 1516)        Goals  Short Term Goals  Time Frame for Short term goals: 10 visits  Short term goal 1: Pt will perform bed mobility independently  Short term goal 2: Pt will perform functional transfers mod I  Short term goal 3: Pt will ambulate 300 ft, CGA, least restrictive AD  Short term goal 4: Pt will improve dynamic standing balance to GOOD to decrease risk of falls  Short term goal 5: Pt will tolerate 30 mins of physical therapy to promote inc activity tolerance  Additional Goals?: No       Therapy Time   Individual Concurrent Group Co-treatment   Time In 0958         Time Out 1021 Minutes 23                 Franklin Doan, BETY   Evaluation/treatment performed by Student PT under the supervision of co-signing PT who agrees with all evaluation/treatment and documentation.

## 2022-04-21 NOTE — ED PROVIDER NOTES
[facility] ED  Emergency Department Encounter  Emergency Medicine Resident     Pt Name: Arnie Hobbs  MRN: [id number]  [place] 1955  Date of evaluation: 4/21/22  PCP:  Akin Mendez MD    CHIEF COMPLAINT       Chief Complaint   Patient presents with    Cerebrovascular Accident     Unknown last time well, arrived [facility] with [facility]       HISTORY OFPRESENT ILLNESS  (Location/Symptom, Timing/Onset, Context/Setting, Quality, Duration, Modifying Factors,Severity.)      Emi Daniels is a 69 y. o.yo female who presents with IPH bleed, transfer from outlying facility. Patient has a bleed in her left basal ganglia area. States she does have history of hypertension currently lives in a nursing home called  and [facility], [employer] was called unable to tell me if patient got her last dose of anticoagulation. Patient is currently on Eliquis and Plavix. States that she did not have any recent falls, is alert and oriented, is poor historian. States that she is not having any chest pain shortness of breath or weakness, headache or vision changes. States that she is moving all of her extremities as she was at baseline. States she has no abdominal pain nausea or vomiting. From the nursing home patient was found altered and was given Narcan she had pinpoint pupils.   Patient remains stable and alert easily awoken exam.     PAST MEDICAL / SURGICAL / SOCIAL / FAMILY HISTORY      has a past medical history of Appendicitis, Cerebral artery occlusion with cerebral infarction ([address] CHF (congestive heart failure) ([address] Chronic respiratory failure with hypoxia and hypercapnia (HCC), Chronic rhinitis, Cigarette smoker, COPD (chronic obstructive pulmonary disease) ([address] Depression, Dysphagia, Essential hypertension, GERD (gastroesophageal reflux disease), Heart failure, diastolic, with acute decompensation ([address] Hepatitis C, chronic ([address] History of smoking at least 1 pack per day for at least 30 years, Hyperlipidemia, Hypertension, Migraine, Moderate COPD (chronic obstructive pulmonary disease) ([facility]), Multiple transfusions, Neurogenic dysphagia, Osteoarthritis, Pneumonia, Scoliosis, and Substance abuse ([facility] has a past surgical history that includes  section; LEEP (); Upper gastrointestinal endoscopy (); Gastric bypass surgery; Appendectomy (2017); laparoscopic appendectomy (N/A, 3/27/2017); Upper gastrointestinal endoscopy (2018); Upper gastrointestinal endoscopy (N/A, 2018); pr colonoscopy w/biopsy single/multiple (N/A, 11/10/2018); pr egd transoral biopsy single/multiple (N/A, 2018); hip surgery (Right, 2019); Total hip arthroplasty (Right, 2019); Medication Injection (Left, 3/25/2021); Upper gastrointestinal endoscopy (N/A, 2021); other surgical history (Left, 2021); Medication Injection (Left, 2021); and Medication Injection (Left, 2021).      Social History     Socioeconomic History    Marital status: Single     Spouse name: Not on file    Number of children: Not on file    Years of education: Not on file    Highest education level: Not on file   Occupational History     Employer: DISABLED   Tobacco Use    Smoking status: Current Some Day Smoker     Packs/day: 0.50     Years: 40.00     Pack years: 20.00     Types: Cigarettes     Start date: 5     Last attempt to quit: 2018     Years since quitting: 3.4    Smokeless tobacco: Never Used   Vaping Use    Vaping Use: Never used   Substance and Sexual Activity    Alcohol use: No     Alcohol/week: 0.0 standard drinks    Drug use: No     Types: IV, Cocaine     Comment: past history    Sexual activity: Not on file   Other Topics Concern    Not on file   Social History Narrative    Not on file     Social Determinants of Health     Financial Resource Strain:     Difficulty of Paying Living Expenses: Not on file   Food Insecurity:     Worried About Running Out of Food in the Last Year: Not on file    Ran Out of Food in the Last Year: Not on file   Transportation Needs:     Lack of Transportation (Medical): Not on file    Lack of Transportation (Non-Medical): Not on file   Physical Activity:     Days of Exercise per Week: Not on file    Minutes of Exercise per Session: Not on file   Stress:     Feeling of Stress : Not on file   Social Connections:     Frequency of Communication with Friends and Family: Not on file    Frequency of Social Gatherings with Friends and Family: Not on file    Attends Jainism Services: Not on file    Active Member of [address] [place] [address] or Organizations: Not on file    Attends Club or Organization Meetings: Not on file    Marital Status: Not on file   Intimate Partner Violence:     Fear of Current or Ex-Partner: Not on file    Emotionally Abused: Not on file    Physically Abused: Not on file    Sexually Abused: Not on file   Housing Stability:     Unable to Pay for Housing in the Last Year: Not on file    Number of [place] in the Last Year: Not on file    Unstable Housing in the Last Year: Not on file       Family History   Problem Relation Age of Onset    Breast Cancer Mother     Heart Disease Father         Allergies:  Aspirin and Emily Humphries Shai [cephalexin]    Home Medications:  Prior to Admission medications    Medication Sig Start Date End Date Taking? Authorizing Provider   lidocaine 4 % external patch Place 1 patch onto the skin daily 3/9/22   Merrill Keating MD   gabapentin (NEURONTIN) 300 MG capsule Take 1 capsule by mouth 3 times daily for 30 days.  Intended supply: 90 days 9/27/21 10/27/21  Radha Momin, APRN - CNP   ondansetron (ZOFRAN-ODT) 8 MG TBDP disintegrating tablet  9/23/21   Historical Provider, MD   mirtazapine (REMERON) 30 MG tablet  9/6/21   Historical Provider, MD   [address] 4-1 % [facility]  9/13/21   Historical Provider, MD   imatinib (GLEEVEC) 400 MG chemo tablet  9/14/21   Historical Provider, MD gabapentin (NEURONTIN) 300 MG capsule  8/31/21   Historical Provider, MD   famotidine (PEPCID) 20 MG tablet  8/24/21   Historical Provider, MD   butalbital-acetaminophen-caffeine (FIORICET, ESGIC) -40 MG per tablet Take 1 tablet by mouth every 12 hours for 14 days 5/13/21 9/27/21  E Edgardo Sandoval MD   ferrous sulfate (FE TABS 325) 325 (65 Fe) MG EC tablet Take 1 tablet by mouth daily 5/14/21   E Edgardo Sandoval MD   amitriptyline (ELAVIL) 10 MG tablet Take 1 tablet by mouth nightly for 14 days 5/13/21 9/27/21  E Edgardo Sandoval MD   atorvastatin (LIPITOR) 40 MG tablet Take 1 tablet by mouth daily for 14 days 5/13/21 9/27/21  E Edgardo Sandoval MD   acetaminophen (TYLENOL) 500 MG tablet Take 2 tablets by mouth every 6 hours as needed for Pain 5/7/21   Tara Leung, DO   mometasone-formoterol (DULERA) 100-5 MCG/ACT inhaler Inhale 2 puffs into the lungs 2 times daily 4/6/21   Bepo Ju, DO   lisinopril (PRINIVIL;ZESTRIL) 10 MG tablet Take 1 tablet by mouth daily 4/6/21 Beula Ju, DO   clopidogrel (PLAVIX) 75 MG tablet Take 1 tablet by mouth daily 4/6/21 Beula Ju, DO   carvedilol (COREG) 6.25 MG tablet Take 1 tablet by mouth 2 times daily (with meals) 4/6/21   Beula Ju, DO   atorvastatin (LIPITOR) 40 MG tablet Take 1 tablet by mouth nightly 4/6/21   Beula Ju, DO   apixaban (ELIQUIS) 5 MG TABS tablet Take 1 tablet by mouth 2 times daily 4/6/21 Bepo Ju, DO   amitriptyline (ELAVIL) 10 MG tablet Take 1 tablet by mouth nightly 4/6/21   Beula Ju, DO   albuterol sulfate HFA (PROVENTIL HFA) 108 (90 Base) MCG/ACT inhaler Inhale 1-2 puffs into the lungs once for 1 dose 4/6/21 9/27/21  Jairo Dodd, DO   amLODIPine (NORVASC) 10 MG tablet Take 1 tablet by mouth daily 2/9/21 2/9/21  May MD Gee   [facility].  Devices [facility]) MISC 1 Device by Does not apply route once for 1 dose 8/5/19 6/6/21  Brooklyn Mccray PA-C   ipratropium-albuterol (DUONEB) 0.5-2.5 (3) MG/3ML SOLN nebulizer solution Inhale 3 mLs into the lungs every 4 and neck supple. Skin:     General: Skin is warm and dry. Capillary Refill: Capillary refill takes less than 2 seconds. Findings: No erythema or rash. Neurological:      Mental Status: She is alert and oriented to person, place, and time. Sensory: No sensory deficit. Deep Tendon Reflexes: Reflexes normal.   Psychiatric:         Behavior: Behavior normal.         DIFFERENTIAL  DIAGNOSIS     PLAN (LABS / IMAGING / EKG):  Orders Placed This Encounter   Procedures    Culture, Urine    XR CHEST PORTABLE    Urinalysis with Microscopic    Troponin    Telemetry monitoring - continuous duration    Inpatient consult to Neurocritical care    Inpatient consult to Neurosurgery    Saline lock IV    Insert peripheral IV    ADMIT TO INPATIENT       MEDICATIONS ORDERED:  No orders of the defined types were placed in this encounter. DDX:     Intraparenchymal hemorrhage    Initial MDM/Plan: 69 y.o. female who presents with ams, IPH. Patient [facility] as a [facility]  Intraparenchymal hemorrhage, 5 mm left basal ganglia intraparenchymal hemorrhage  Patient currently on Eliquis and Plavix  No traumatic injuries  States that she did not fall  Unknown time of last Eliquis dose  According to patient estimated around 10 PM, did reach out and try to call the facility patient is not in their system. No neuro deficits on exam  EKG, chest x-ray ordered, troponin x2 ordered  Discussed findings with neuro critical care  Discussed with neurosurgery  No plan for reversal here in the emergency department    Disposition:  Admitted to neuro critical care for further management    DIAGNOSTIC RESULTS / EMERGENCYDEPARTMENT COURSE / MDM     LABS:  No results found for this visit on 04/21/22.     RADIOLOGY:  XR CHEST PORTABLE    (Results Pending)         EMERGENCY DEPARTMENT COURSE:  ED Course as of 04/21/22 0250   Thu Apr 21, 2022   0236 Patient seen and assessed in the emergency department no acute respiratory or cardiovascular distress. Patient here as a transfer from [place] and's as a IPH, unknown etiology likely not traumatic. Patient has a bleed in her left basal ganglia area. States she does have history of hypertension currently lives in a nursing home called 8 and [facility] was called unable to tell me if patient got her last dose of anticoagulation. Patient is currently on Eliquis and Plavix. States that she did not have any recent falls, is alert and oriented, is poor historian. States that she is not having any chest pain shortness of breath or weakness, headache or vision changes. States that she is moving all of her extremities as she was at baseline. States she has no abdominal pain nausea or vomiting. From the nursing home patient was found altered and was given Narcan she had pinpoint pupils. Patient remains stable and alert easily awoken exam. [PS]   7126 5 mm left basal ganglial hemorrhage.  Otherwise chronic microvascular disease  and involutional changes. [PS]   6421 Unremarkable physical exam, no weakness noted on my exam, will page neuro critical care and neurosurgery. [PS]   [phone] Did call patient's nursing home facility and they were unable to find the patient's name, unable to get last Eliquis dose time. Will likely have been around 10 PM according to patient [PS]      ED Course User Index  [PS] Gregorio Finney MD          PROCEDURES:  None    CONSULTS:  IP CONSULT TO NEUROCRITICAL CARE  IP CONSULT TO NEUROSURGERY    CRITICAL CARE:  Please see attending note    FINAL IMPRESSION      1. Intraparenchymal hemorrhage of brain ([facility] [address]          DISPOSITION / PLAN     DISPOSITION Admitted 04/21/2022 02:49:54 AM       PATIENT REFERRED TO:  No follow-up provider specified.     DISCHARGE MEDICATIONS:  New Prescriptions    No medications on file       Gregorio Finney MD  Emergency Medicine Resident    (Please note that portions of this note were completed with a voice recognition program.Efforts were made to edit the dictations but occasionally words are mis-transcribed.)       Catrina Leonard MD  Resident  04/21/22 8391

## 2022-04-21 NOTE — PROGRESS NOTES
Occupational Therapy  [facility]  Occupational Therapy Initial Assessment    Name: Giancarlo Brown  : 1955  MRN: [id number]  Date of Service: 2022    Discharge Recommendations:  Patient would benefit from continued therapy after discharge   OT Equipment Recommendations  Equipment Needed: No       Patient Diagnosis(es): The encounter diagnosis was Intraparenchymal hemorrhage of brain ([address] Past Medical History:  has a past medical history of Appendicitis, Cerebral artery occlusion with cerebral infarction ([address] CHF (congestive heart failure) ([address] Chronic respiratory failure with hypoxia and hypercapnia (HCC), Chronic rhinitis, Cigarette smoker, COPD (chronic obstructive pulmonary disease) ([address] Depression, Dysphagia, Essential hypertension, GERD (gastroesophageal reflux disease), Heart failure, diastolic, with acute decompensation ([address] Hepatitis C, chronic ([address] History of smoking at least 1 pack per day for at least 30 years, Hyperlipidemia, Hypertension, Migraine, Moderate COPD (chronic obstructive pulmonary disease) ([address] Multiple transfusions, Neurogenic dysphagia, Osteoarthritis, Pneumonia, Scoliosis, and Substance abuse ([address] Past Surgical History:  has a past surgical history that includes  section; LEEP (); Upper gastrointestinal endoscopy (); Gastric bypass surgery; Appendectomy (2017); laparoscopic appendectomy (N/A, 3/27/2017); Upper gastrointestinal endoscopy (2018); Upper gastrointestinal endoscopy (N/A, 2018); pr colonoscopy w/biopsy single/multiple (N/A, 11/10/2018); pr egd transoral biopsy single/multiple (N/A, 2018); hip surgery (Right, 2019); Total hip arthroplasty (Right, 2019); Medication Injection (Left, 3/25/2021); Upper gastrointestinal endoscopy (N/A, 2021); other surgical history (Left, 2021); Medication Injection (Left, 2021); and Medication Injection (Left, 2021). Assessment   Performance deficits / Impairments: Decreased functional mobility ; Decreased ADL status; Decreased safe awareness;Decreased cognition;Decreased endurance;Decreased high-level IADLs  Assessment: Pt required significant education/encouragement throughout entire session for participation and education with occupational therapy. Completed sit<>supine transfer with SUP, functional sit<>stand transfers with SBA and functional mobility with SBA. Pt demonstrated feeding IND and toileting with SBA. Pt self limiting, denying participation in further functional activity. Required significant time for encouragement and education on importance of participation in therapy. Pt is expected to require skilled OT services during their acute hospitalization stay to address the above noted deficits through skilled occupational therapy intervention for promotion of increased independence throughout ADLs, IADLs and functional mobility tasks. Pt is currently unsafe to return to their prior living arrangements without 24/7 assist/supervision to safely engage in all aspects of ADLs, IADLs and functional mobility tasks.    Prognosis: Good  Decision Making: Medium Complexity  REQUIRES OT FOLLOW-UP: Yes  Activity Tolerance  Activity Tolerance: Patient Tolerated treatment well      Education: Educated on OT role, OT POC, activity promotion, safety awarenss-good to fair return   Safety devices: Bed alarm in place, call light in reach, gat belt, nurse notified, left in bed   Restraints: no     Plan   Plan  Times per Week: 3-4x/wk  Current Treatment Recommendations: Functional mobility training,Endurance training,Balance training,Safety education & training,Patient/Caregiver education & training,Equipment evaluation, education, & procurement,Self-Care / ADL,Home management training     Restrictions  Restrictions/Precautions  Restrictions/Precautions: Fall Risk,General Precautions  Required Braces or Orthoses?: No  Position Activity Restriction  Other position/activity restrictions: up with assistance; SBP <140    Subjective   General  Patient assessed for rehabilitation services?: Yes  Family / Caregiver Present: No  General Comment  Comments: RN ok'f or OT carole this PM w/ significant encouragement. Pt easily frustrated. Cooperative throughout with encouragement. Pre Treatment Pain Screening  Pain at present: 0  Scale Used: Numeric Score  Comments / Details: Pt denies pain. Social/Functional History  Social/Functional History  Lives With: Alone  Type of Home: Facility  Home Layout: One level  Home Access: Level entry  Receives Help From: Family  Additional Comments: Pt admitted from facility and was getting PT services, and reports plans to return to facility. Pt agitated when being asked questions. Objective   Hearing: Within functional limits         Balance  Sitting Balance: Supervision (Seated EOB and seated on BSC for toileting tasks ~6 minutes)  Standing Balance: Stand by assistance  Standing Balance  Time: 15 seconds  Activity: static standing  Comment: No LOB/unsteadiness    Functional Mobility  Functional - Mobility Device: No device  Activity: Other  Assist Level: Stand by assistance  Functional Mobility Comments: Took ~4 small steps to/from [facility] with no AD. No LOB/unsteadiness. Pt self limiting, denying participation in further functional mobility. Toilet Transfers  Toilet - Technique: Stand step  Equipment Used: Standard bedside commode  Toilet Transfer: Stand by assistance     ADL  Feeding: Independent (Pt IND feeding self upon arrival and during questions/education. Able to feed self with fork, open containers and manage beverages.)  Grooming: Independent  UE Bathing: Stand by assistance; Increased time to complete  LE Bathing: Setup; Increased time to complete;Contact guard assistance  UE Dressing: Stand by assistance  LE Dressing: Contact guard assistance; Increased time to complete  Toileting: Stand by assistance (Completed clothing management standing with SBA and pericare seated with SUP)  Additional Comments: Pt self limiting, denying participation in further ADLs/functional activities     Activity Tolerance  Activity Tolerance: Patient tolerated treatment well;Patient limited by pain     Bed mobility  Supine to Sit: Stand by assistance  Sit to Supine: Stand by assistance  Scooting: Stand by assistance  Comment: HOB elevated ~40 degrees     Transfers  Sit to stand: Stand by assistance  Stand to sit: Stand by assistance  Transfer Comments: No AD     Cognition  Overall Cognitive Status: Exceptions  Arousal/Alertness: Appropriate responses to stimuli  Following Commands: Follows one step commands consistently  Attention Span: Appears intact  Memory: Appears intact  Safety Judgement: Decreased awareness of need for assistance;Decreased awareness of need for safety  Insights: Decreased awareness of deficits  Initiation: Requires cues for some  Sequencing: Does not require cues  Cognition Comment: Pt requiring continued education. Pt often frustrated, not attending to writer's attempt to assist pt. Pt stating \"You come in and rush me\", pt reminded she was asked if she would like writer to check back later.                   LUE AROM (degrees)  LUE AROM : WFL  Left Hand AROM (degrees)  Left Hand AROM: WFL  RUE AROM (degrees)  RUE AROM : WFL  Right Hand AROM (degrees)  Right Hand AROM: WFL        Hand Dominance  Hand Dominance: Right       AM-PAC Score        AM-PAC Inpatient Daily Activity Raw Score: 20 (04/21/22 1621)  AM-PAC Inpatient ADL T-Scale Score : 42.03 (04/21/22 1621)  ADL Inpatient CMS 0-100% Score: 38.32 (04/21/22 1621)  ADL Inpatient CMS G-Code Modifier : CJ (04/21/22 1621)    Goals  Short Term Goals  Time Frame for Short term goals: By discharge, pt will:  Short Term Goal 1: Demo functional sit<>stand transfers and functional mobility with SUP and LRD PRN  Short Term Goal 2: Demo 8 minutes of dynamic standing balance with SBA to promote increased engagement in ADLs  Short Term Goal 3: Demo UB ADL with Mod IND  Short Term Goal 4: Demo LB ADLs/toileting with SUP and DME PRN  Short Term Goal 5: Demo 25 minutes of activity tolerance to promote increased endurance throughout all ADLs       Therapy Time   Individual Concurrent Group Co-treatment   Time In 1304         Time Out 1325         Minutes 21         Timed Code Treatment Minutes: 16 Minutes       Miroslava Fermin, OTR/L

## 2022-04-22 ENCOUNTER — APPOINTMENT (OUTPATIENT)
Dept: GENERAL RADIOLOGY | Age: 67
DRG: 044 | End: 2022-04-22
Payer: MEDICARE

## 2022-04-22 ENCOUNTER — APPOINTMENT (OUTPATIENT)
Dept: MRI IMAGING | Age: 67
DRG: 044 | End: 2022-04-22
Payer: MEDICARE

## 2022-04-22 PROBLEM — Z86.718 HISTORY OF BLOOD CLOTS: Status: ACTIVE | Noted: 2022-04-22

## 2022-04-22 PROBLEM — C49.A4 GIST (GASTROINTESTINAL STROMA TUMOR), MALIGNANT, COLON (HCC): Status: ACTIVE | Noted: 2022-04-22

## 2022-04-22 PROBLEM — D53.9 ANEMIA, MACROCYTIC: Status: ACTIVE | Noted: 2018-11-12

## 2022-04-22 LAB
ANION GAP SERPL CALCULATED.3IONS-SCNC: 9 MMOL/L (ref 9–17)
BUN BLDV-MCNC: 14 MG/DL (ref 8–23)
CALCIUM SERPL-MCNC: 8.8 MG/DL (ref 8.6–10.4)
CHLORIDE BLD-SCNC: 108 MMOL/L (ref 98–107)
CO2: 22 MMOL/L (ref 20–31)
CREAT SERPL-MCNC: 0.49 MG/DL (ref 0.5–0.9)
CULTURE: NORMAL
EKG ATRIAL RATE: 67 BPM
EKG P AXIS: 70 DEGREES
EKG P-R INTERVAL: 202 MS
EKG Q-T INTERVAL: 400 MS
EKG QRS DURATION: 78 MS
EKG QTC CALCULATION (BAZETT): 422 MS
EKG R AXIS: 55 DEGREES
EKG T AXIS: 75 DEGREES
EKG VENTRICULAR RATE: 67 BPM
GFR AFRICAN AMERICAN: >60 ML/MIN
GFR NON-AFRICAN AMERICAN: >60 ML/MIN
GFR SERPL CREATININE-BSD FRML MDRD: ABNORMAL ML/MIN/{1.73_M2}
GLUCOSE BLD-MCNC: 87 MG/DL (ref 70–99)
HCT VFR BLD CALC: 38.4 % (ref 36.3–47.1)
HEMOGLOBIN: 11.7 G/DL (ref 11.9–15.1)
MCH RBC QN AUTO: 33.4 PG (ref 25.2–33.5)
MCHC RBC AUTO-ENTMCNC: 30.5 G/DL (ref 28.4–34.8)
MCV RBC AUTO: 109.7 FL (ref 82.6–102.9)
NRBC AUTOMATED: 0 PER 100 WBC
PDW BLD-RTO: 13.7 % (ref 11.8–14.4)
PLATELET # BLD: 407 K/UL (ref 138–453)
PMV BLD AUTO: 10.1 FL (ref 8.1–13.5)
POTASSIUM SERPL-SCNC: 4.3 MMOL/L (ref 3.7–5.3)
RBC # BLD: 3.5 M/UL (ref 3.95–5.11)
SODIUM BLD-SCNC: 139 MMOL/L (ref 135–144)
SPECIMEN DESCRIPTION: NORMAL
WBC # BLD: 8.9 K/UL (ref 3.5–11.3)

## 2022-04-22 PROCEDURE — 6360000002 HC RX W HCPCS: Performed by: STUDENT IN AN ORGANIZED HEALTH CARE EDUCATION/TRAINING PROGRAM

## 2022-04-22 PROCEDURE — 36415 COLL VENOUS BLD VENIPUNCTURE: CPT

## 2022-04-22 PROCEDURE — 70551 MRI BRAIN STEM W/O DYE: CPT

## 2022-04-22 PROCEDURE — 80048 BASIC METABOLIC PNL TOTAL CA: CPT

## 2022-04-22 PROCEDURE — 6370000000 HC RX 637 (ALT 250 FOR IP): Performed by: INTERNAL MEDICINE

## 2022-04-22 PROCEDURE — 6370000000 HC RX 637 (ALT 250 FOR IP): Performed by: STUDENT IN AN ORGANIZED HEALTH CARE EDUCATION/TRAINING PROGRAM

## 2022-04-22 PROCEDURE — 99232 SBSQ HOSP IP/OBS MODERATE 35: CPT | Performed by: PSYCHIATRY & NEUROLOGY

## 2022-04-22 PROCEDURE — 94640 AIRWAY INHALATION TREATMENT: CPT

## 2022-04-22 PROCEDURE — 2500000003 HC RX 250 WO HCPCS: Performed by: NURSE PRACTITIONER

## 2022-04-22 PROCEDURE — 85027 COMPLETE CBC AUTOMATED: CPT

## 2022-04-22 PROCEDURE — 92523 SPEECH SOUND LANG COMPREHEN: CPT

## 2022-04-22 PROCEDURE — 6370000000 HC RX 637 (ALT 250 FOR IP): Performed by: PSYCHIATRY & NEUROLOGY

## 2022-04-22 PROCEDURE — 97535 SELF CARE MNGMENT TRAINING: CPT

## 2022-04-22 PROCEDURE — 2060000000 HC ICU INTERMEDIATE R&B

## 2022-04-22 PROCEDURE — 71045 X-RAY EXAM CHEST 1 VIEW: CPT

## 2022-04-22 PROCEDURE — 99223 1ST HOSP IP/OBS HIGH 75: CPT | Performed by: INTERNAL MEDICINE

## 2022-04-22 RX ORDER — BUDESONIDE AND FORMOTEROL FUMARATE DIHYDRATE 160; 4.5 UG/1; UG/1
2 AEROSOL RESPIRATORY (INHALATION) 2 TIMES DAILY
Refills: 1 | Status: DISCONTINUED | OUTPATIENT
Start: 2022-04-22 | End: 2022-04-24 | Stop reason: HOSPADM

## 2022-04-22 RX ORDER — OXYCODONE HYDROCHLORIDE AND ACETAMINOPHEN 5; 325 MG/1; MG/1
2 TABLET ORAL EVERY 6 HOURS PRN
Status: DISCONTINUED | OUTPATIENT
Start: 2022-04-22 | End: 2022-04-24 | Stop reason: HOSPADM

## 2022-04-22 RX ORDER — BUTALBITAL, ACETAMINOPHEN AND CAFFEINE 50; 325; 40 MG/1; MG/1; MG/1
1 TABLET ORAL EVERY 6 HOURS PRN
Status: DISCONTINUED | OUTPATIENT
Start: 2022-04-22 | End: 2022-04-24 | Stop reason: HOSPADM

## 2022-04-22 RX ORDER — LORAZEPAM 0.5 MG/1
0.5 TABLET ORAL ONCE
Status: COMPLETED | OUTPATIENT
Start: 2022-04-22 | End: 2022-04-22

## 2022-04-22 RX ORDER — CARVEDILOL 12.5 MG/1
12.5 TABLET ORAL 2 TIMES DAILY WITH MEALS
Status: DISCONTINUED | OUTPATIENT
Start: 2022-04-22 | End: 2022-04-24 | Stop reason: HOSPADM

## 2022-04-22 RX ORDER — MIRTAZAPINE 30 MG/1
30 TABLET, FILM COATED ORAL NIGHTLY
Status: DISCONTINUED | OUTPATIENT
Start: 2022-04-22 | End: 2022-04-24 | Stop reason: HOSPADM

## 2022-04-22 RX ORDER — MORPHINE SULFATE 4 MG/ML
4 INJECTION, SOLUTION INTRAMUSCULAR; INTRAVENOUS ONCE
Status: DISCONTINUED | OUTPATIENT
Start: 2022-04-22 | End: 2022-04-24 | Stop reason: HOSPADM

## 2022-04-22 RX ORDER — GABAPENTIN 600 MG/1
300 TABLET ORAL 3 TIMES DAILY
Status: DISCONTINUED | OUTPATIENT
Start: 2022-04-22 | End: 2022-04-24 | Stop reason: HOSPADM

## 2022-04-22 RX ORDER — FENTANYL CITRATE 50 UG/ML
50 INJECTION, SOLUTION INTRAMUSCULAR; INTRAVENOUS ONCE
Status: COMPLETED | OUTPATIENT
Start: 2022-04-22 | End: 2022-04-22

## 2022-04-22 RX ORDER — ENOXAPARIN SODIUM 100 MG/ML
30 INJECTION SUBCUTANEOUS DAILY
Status: DISCONTINUED | OUTPATIENT
Start: 2022-04-22 | End: 2022-04-24 | Stop reason: HOSPADM

## 2022-04-22 RX ORDER — HYDRALAZINE HYDROCHLORIDE 20 MG/ML
10 INJECTION INTRAMUSCULAR; INTRAVENOUS EVERY 6 HOURS PRN
Status: DISCONTINUED | OUTPATIENT
Start: 2022-04-22 | End: 2022-04-24 | Stop reason: HOSPADM

## 2022-04-22 RX ADMIN — OXYCODONE HYDROCHLORIDE AND ACETAMINOPHEN 2 TABLET: 5; 325 TABLET ORAL at 20:45

## 2022-04-22 RX ADMIN — CARVEDILOL 12.5 MG: 12.5 TABLET, FILM COATED ORAL at 18:32

## 2022-04-22 RX ADMIN — ACETAMINOPHEN 650 MG: 325 TABLET ORAL at 04:23

## 2022-04-22 RX ADMIN — BUDESONIDE AND FORMOTEROL FUMARATE DIHYDRATE 2 PUFF: 160; 4.5 AEROSOL RESPIRATORY (INHALATION) at 20:45

## 2022-04-22 RX ADMIN — GABAPENTIN 300 MG: 600 TABLET ORAL at 21:00

## 2022-04-22 RX ADMIN — MIRTAZAPINE 30 MG: 30 TABLET, FILM COATED ORAL at 21:00

## 2022-04-22 RX ADMIN — DESMOPRESSIN ACETATE 40 MG: 0.2 TABLET ORAL at 21:00

## 2022-04-22 RX ADMIN — FENTANYL CITRATE 50 MCG: 50 INJECTION, SOLUTION INTRAMUSCULAR; INTRAVENOUS at 06:56

## 2022-04-22 RX ADMIN — FAMOTIDINE 20 MG: 20 TABLET, FILM COATED ORAL at 08:18

## 2022-04-22 RX ADMIN — BUTALBITAL, ACETAMINOPHEN, AND CAFFEINE 1 TABLET: 50; 325; 40 TABLET ORAL at 16:03

## 2022-04-22 RX ADMIN — HYDRALAZINE HYDROCHLORIDE 10 MG: 20 INJECTION, SOLUTION INTRAMUSCULAR; INTRAVENOUS at 08:18

## 2022-04-22 RX ADMIN — GABAPENTIN 300 MG: 600 TABLET ORAL at 09:54

## 2022-04-22 RX ADMIN — Medication 10 MG: at 05:07

## 2022-04-22 RX ADMIN — OXYCODONE 10 MG: 5 TABLET ORAL at 08:23

## 2022-04-22 RX ADMIN — ENOXAPARIN SODIUM 30 MG: 100 INJECTION SUBCUTANEOUS at 09:57

## 2022-04-22 RX ADMIN — CARVEDILOL 12.5 MG: 12.5 TABLET, FILM COATED ORAL at 08:18

## 2022-04-22 RX ADMIN — LORAZEPAM 0.5 MG: 0.5 TABLET ORAL at 16:03

## 2022-04-22 RX ADMIN — AMITRIPTYLINE HYDROCHLORIDE 10 MG: 10 TABLET, FILM COATED ORAL at 21:00

## 2022-04-22 RX ADMIN — GABAPENTIN 300 MG: 600 TABLET ORAL at 14:49

## 2022-04-22 RX ADMIN — OXYCODONE 10 MG: 5 TABLET ORAL at 04:23

## 2022-04-22 RX ADMIN — OXYCODONE 10 MG: 5 TABLET ORAL at 12:36

## 2022-04-22 ASSESSMENT — PAIN DESCRIPTION - LOCATION
LOCATION: ABDOMEN;HIP
LOCATION: ABDOMEN;HIP
LOCATION: HEAD
LOCATION: ABDOMEN;HIP
LOCATION: HEAD

## 2022-04-22 ASSESSMENT — PAIN DESCRIPTION - ORIENTATION
ORIENTATION: LEFT

## 2022-04-22 ASSESSMENT — PAIN SCALES - GENERAL
PAINLEVEL_OUTOF10: 10
PAINLEVEL_OUTOF10: 9
PAINLEVEL_OUTOF10: 10
PAINLEVEL_OUTOF10: 9

## 2022-04-22 ASSESSMENT — ENCOUNTER SYMPTOMS
PHOTOPHOBIA: 0
ABDOMINAL DISTENTION: 0
BLOOD IN STOOL: 0
SHORTNESS OF BREATH: 0
VOMITING: 0
WHEEZING: 0
NAUSEA: 0
BACK PAIN: 1
COUGH: 1

## 2022-04-22 ASSESSMENT — PAIN DESCRIPTION - DESCRIPTORS
DESCRIPTORS: ACHING;SHARP
DESCRIPTORS: SHARP;ACHING
DESCRIPTORS: ACHING

## 2022-04-22 ASSESSMENT — PAIN DESCRIPTION - PAIN TYPE: TYPE: CHRONIC PAIN

## 2022-04-22 NOTE — PROGRESS NOTES
Physical Therapy        Physical Therapy Cancel Note      DATE: 2022    NAME: Arnie Hobbs  MRN: [id number]   : 1955      Patient not seen this date for Physical Therapy due to:    Patient Declined: checked on pt 2x this am, refusing both times due to headache, not feeling well \"needing more meds\"      Electronically signed by Kishore Fernandez PTA on 2022 at 12:25 PM

## 2022-04-22 NOTE — H&P
[facility]  Office: [address], DO, Yolie , DO, Camilla Alejo, DO, Bahman Lomeli Blood, DO, Anjali Drew MD, Feng Meeks MD, Guille Ramos MD, Cali Torres MD, Familia Watson MD, Stevie León MD, Jenni Armenta MD, Krystina Goodman, DO, Rodolfo Arora, DO, Domi Jefferson MD,  Susana Craven, DO, Jaspal Avalos MD, Mike Wheatley MD, Hope Opitz, MD, Arianna Griffith, DO, Lucas Santamaria MD, Yusuf Estrella MD, Solange Dodson, CNP, [facility] Porras, CNP, Sedrick Galan, CNP, Davida Bishop, CNP, Digna Pollard, CNP, Joaquina Richter, CNP, Simran Jacobo PA-C, Osman Bolton, DNP, Rosi Rice, CNP, Cee Tanner, CNP, Caitlin Marin, CNP, Christy Robles, CNS, Yo Julian, [facility], Mariana Mcmillan, CNP, Rosalind Islas, CNP, Royal Dupont, CNP         IN-PATIENT SERVICE  History and Physical  [facility]      Name: Shivani Garcia  MRN: [id number]     Acct: [de-identified]  [facility]    Admit Date: 4/21/2022  PCP: Ahmet Rico MD    Chief Complaint:  Chief Complaint   Patient presents with    Cerebrovascular Accident     Unknown last time well, arrived [facility] with [facility]        History of Present Illness:  Emi Cabrera [place] is a 69 y.o.  female who presents with Cerebrovascular Accident (Unknown last time well, arrived [facility] with [facility])    Patient was seen at the request of the Neurointensive care service for assumption of care    As documented in the medical record the patient was admitted 4/21 with an intracranial bleed    Patient reports that [birth year], had a rather busy day.   She returned to her nursing facility at [place]  She was resting quietly when she lost consciousness  Patient awoke in the neuro critical care unit    At this time she denies any residual deficits  Her chronic migraine seems to be exacerbated with frontal occipital headache  Chronic blurred vision but no new visual symptoms    Database has included:  CT brain:  Impression:     1. Stable 6 mm intraparenchymal hemorrhage involving the posterior left   putamen. This may be related to underlying hypertensive hemorrhage. 2. Stable cerebral parenchymal volume loss with chronic microvascular white   matter ischemic disease. 3. No new areas of hemorrhage. CTA:  Impression:        Unremarkable CTA of the head and neck. EEG:  Clinical correlation   This EEG was abnormal. Disorganized and slow background suggested   mild to moderate encephalopathy of non specific etiology. WBC8.9k/uL RBC3.50 Low m/uL Vlecgwginr10.7 Low g/dL Owvihskgus49.4% QDS650.7 High      Anemia work-up has included:  Results for Franine Gross (MRN [id number]) as of 4/22/2022 14:08   Ref. Range 2/5/2019 03:58 4/21/2019 01:41 9/22/2019 06:18 1/27/2020 04:48 7/31/2020 16:10   Ferritin Latest Ref Range: 13 - 150 ug/L   69  138   Iron Latest Ref Range: 37 - 145 ug/dL   23 (L) 40 36 (L)   Iron Saturation Latest Ref Range: 20 - 55 %   7 (L) 9 (L) 14 (L)   UIBC Latest Ref Range: 112 - 347 ug/dL   295 416 (H) 213   TIBC Latest Ref Range: 250 - 450 ug/dL   318 456 (H) 249 (L)   Transferrin Latest Ref Range: 200 - 360 mg/dL     197 (L)   FOLATE, FOLAT Latest Ref Range: >4.8 ng/mL >20.0 14.3   16.7   Vitamin B-12 Latest Ref Range: 232 - 1245 pg/mL 469 633   379       Chest x-ray:  Impression:        1. Cardiomegaly with increasing pulmonary vascular congestion and atelectasis   in the left lung base. CT abdomen 5/19/2021:  1. Prominence of the common bile duct and pancreatic duct are similar in   appearance to prior examination. 2. Mixed solid and cystic lesion along the inferior margin of the pancreatic   body is similar in appearance to prior examination. 3. Hepatic steatosis. CT abdomen 1/3/2022:  Slight decrease in size of the known pancreatic mass.       Otherwise no evidence of metastatic disease elsewhere within the   chest/abdomen/pelvis. EUS 4/5/21:  Pancreas:  There was a lesion near the pancreatic body measuring 23 mm x 18 mm. This had well-defined borders. There was a small calcifications within the lesion. This was solid-appearing. This was hypoechoic. Fine-needle biopsy of this lesion was performed via transgastric route with 22-gauge fine-needle biopsy needle. 4 passes were made. Preliminary cytology showed adequate cellularity. The rest of the pancreas otherwise appeared normal. Normal PD. No signs of chronic pancreatitis. CBD: Normal, no stones, sludge. CBD diameter: 5 mm. Left adrenal: Normal.   Ampulla: Normal.  Left lobe of liver: normal.   Lymphadenopathy: No pathologic lymphadenopathy seen in upper abdomen. The patient did undergo a EUS with biopsy on 4/29/2021  Cell block contains small fragments of atypical spindle cells which   are diffusely positive for  and CD34, compatible with   Gastrointestinal stromal tumor (GIST).  Further histologic evaluation   for grading and risk assessment will have to be performed on resection   specimen. Echo 2019:  Small LV cavity. Hyperdynamic left ventricular function. Calculated EF via heart model method  is 69 %. Moderate left ventricular hypertrophy. Basal septal hypertrophy (\"sigmoid  septum\"). Grade I (mild) left ventricular diastolic dysfunction. Left atrium is mildly dilated. Mild tricuspid regurgitation. Estimated right ventricular systolic pressure  is 35 mmHg.       PMHx:  Past Medical History:   Diagnosis Date    Appendicitis 3/27/2017    Cerebral artery occlusion with cerebral infarction [facility])     CHF (congestive heart failure) (HCC)     Chronic respiratory failure with hypoxia and hypercapnia (HCC)     Chronic rhinitis     Cigarette smoker     COPD (chronic obstructive pulmonary disease) ([address] 8/5/2018    Depression     Dysphagia     Essential hypertension 8/11/2018    GERD (gastroesophageal reflux disease)     Heart failure, diastolic, with acute decompensation ([address] 9/5/2018    [facility] C, chronic [facility]     History of smoking at least 1 pack per day for at least 30 years     Hyperlipidemia     Hypertension     Migraine     Moderate COPD (chronic obstructive pulmonary disease) (HCC)     Multiple transfusions     Neurogenic dysphagia     Osteoarthritis     hands    Pneumonia     Scoliosis     Substance abuse ([facility]     ivda and cocaine abuse in past        PSHx:  Past Surgical History:   Procedure Laterality Date    APPENDECTOMY  2017     SECTION      GASTRIC BYPASS SURGERY      HIP SURGERY Right 2019    HIP TOTAL ARTHROPLASTY ANTERIOR APPROACH - [facility],  C-ARM, (Right )    LAPAROSCOPIC APPENDECTOMY N/A 3/27/2017    APPENDECTOMY LAPAROSCOPIC performed by Bryce Bhardwaj MD at [facility]      hgsil    MEDICATION INJECTION Left 3/25/2021    STEROID INJECTION LEFT HIP  WITH FLUORO performed by Nikhil Castillo MD at [address] Left 2021    INJECTION STEROID LEFT HIP WITH FLUORO- ATTEMPTED performed by Nikhil Castillo MD at [address] Left 2021    INJECTION STEROID LEFT HIP performed by Nikhil Castillo MD at [facility] Left 2021    hip injection    CO COLONOSCOPY W/BIOPSY SINGLE/MULTIPLE N/A 11/10/2018    COLONOSCOPY WITH BIOPSY performed by Jaswinder Garcia MD at [facility]    CO EGD TRANSORAL BIOPSY SINGLE/MULTIPLE N/A 2018    EGD ESOPHAGOGASTRODUODENOSCOPY performed by Nena Bledsoe MD at [facility] [facility] Right 2019    HIP TOTAL ARTHROPLASTY ANTERIOR APPROACH - [facility],  C-ARM, performed by Cira Amaral DO at [facility]      oesophagitis, candidiasis    UPPER GASTROINTESTINAL ENDOSCOPY  2018    UPPER GASTROINTESTINAL ENDOSCOPY N/A 2018    EGD CONTROL HEMORRHAGE performed by Nena Bledsoe MD at [facility] 2021    EGD W/EUS FNA performed by Ilya Hubbard MD at [address]:  Prior to Admission medications    Medication Sig Start Date End Date Taking? Authorizing Provider   gabapentin (NEURONTIN) 300 MG capsule Take 1 capsule by mouth 3 times daily for 30 days. Intended supply: 90 days 9/27/21 10/27/21  Tana Walker APRN - CNP   ondansetron (ZOFRAN-ODT) 8 MG TBDP disintegrating tablet  9/23/21   Historical Provider, MD   mirtazapine (REMERON) 30 MG tablet  9/6/21   Historical Provider, MD   famotidine (PEPCID) 20 MG tablet  8/24/21   Historical Provider, MD   butalbital-acetaminophen-caffeine (FIORICET, ESGIC) -40 MG per tablet Take 1 tablet by mouth every 12 hours for 14 days 5/13/21 9/27/21  E Rosalina Juares MD   ferrous sulfate (FE TABS 325) 325 (65 Fe) MG EC tablet Take 1 tablet by mouth daily 5/14/21   E Rosalina Juares MD   amitriptyline (ELAVIL) 10 MG tablet Take 1 tablet by mouth nightly for 14 days 5/13/21 9/27/21  ISIDORO Juares MD   atorvastatin (LIPITOR) 40 MG tablet Take 1 tablet by mouth daily for 14 days 5/13/21 9/27/21  ISIDORO Juares MD   acetaminophen (TYLENOL) 500 MG tablet Take 2 tablets by mouth every 6 hours as needed for Pain 5/7/21   Tara Leung,    mometasone-formoterol (DULERA) 100-5 MCG/ACT inhaler Inhale 2 puffs into the lungs 2 times daily 4/6/21   Nilsa Ac DO   clopidogrel (PLAVIX) 75 MG tablet Take 1 tablet by mouth daily 4/6/21   Nilsa Ac DO   carvedilol (COREG) 6.25 MG tablet Take 1 tablet by mouth 2 times daily (with meals) 4/6/21   Nilsa Ac DO   atorvastatin (LIPITOR) 40 MG tablet Take 1 tablet by mouth nightly 4/6/21   Nilsa Ac DO   amitriptyline (ELAVIL) 10 MG tablet Take 1 tablet by mouth nightly 4/6/21   Nilsa Ac DO   albuterol sulfate HFA (PROVENTIL HFA) 108 (90 Base) MCG/ACT inhaler Inhale 1-2 puffs into the lungs once for 1 dose 4/6/21 9/27/21  Nilsa cA DO   amLODIPine (NORVASC) 10 MG tablet Take 1 tablet by mouth daily 2/9/21 2/9/21  Rocael Perry MD   Misc.  Devices \"migraine\"). Negative for dizziness, seizures, weakness and light-headedness. Psychiatric/Behavioral: The patient is nervous/anxious. Physical Exam:  Vitals:  BP (!) 148/90   Pulse 82   Temp 96.2 °F (35.7 °C) (Oral)   Resp 23   LMP 2015   SpO2 97%   Temp (24hrs), Av.2 °F (36.8 °C), Min:96.2 °F (35.7 °C), Max:99.2 °F (37.3 °C)    Physical Exam  Vitals reviewed. Constitutional:       General: She is not in acute distress. Appearance: She is not diaphoretic. HENT:      Head: Normocephalic. Nose: Nose normal.   Eyes:      General: No scleral icterus. Conjunctiva/sclera: Conjunctivae normal.   Neck:      Trachea: No tracheal deviation. Cardiovascular:      Rate and Rhythm: Normal rate and regular rhythm. Pulmonary:      Effort: Pulmonary effort is normal. No respiratory distress. Breath sounds: Normal breath sounds. No wheezing or rales. Chest:      Chest wall: No tenderness. Abdominal:      General: There is no distension. Palpations: Abdomen is soft. Tenderness: There is no abdominal tenderness. Musculoskeletal:         General: No tenderness. Cervical back: Neck supple. Comments: Muscle mass diminished   Skin:     General: Skin is warm and dry.    Neurological:      Comments: Orientated x3  Doing ok with historical data  Moving extremities x4  No gross motor or sensory deficits  Speech fluent   equal          Data:  Laboratory Testing:  Recent Results (from the past 24 hour(s))   Basic metabolic panel    Collection Time: 22  3:35 AM   Result Value Ref Range    Glucose 87 70 - 99 mg/dL    BUN 14 8 - 23 mg/dL    CREATININE 0.49 (L) 0.50 - 0.90 mg/dL    Calcium 8.8 8.6 - 10.4 mg/dL    Sodium 139 135 - 144 mmol/L    Potassium 4.3 3.7 - 5.3 mmol/L    Chloride 108 (H) 98 - 107 mmol/L    CO2 22 20 - 31 mmol/L    Anion Gap 9 9 - 17 mmol/L    GFR Non-African American >60 >60 mL/min    GFR African American >60 >60 mL/min    GFR Comment CBC    Collection Time: 04/22/22  3:35 AM   Result Value Ref Range    WBC 8.9 3.5 - 11.3 k/uL    RBC 3.50 (L) 3.95 - 5.11 m/uL    Hemoglobin 11.7 (L) 11.9 - 15.1 g/dL    Hematocrit 38.4 36.3 - 47.1 %    .7 (H) 82.6 - 102.9 fL    MCH 33.4 25.2 - 33.5 pg    MCHC 30.5 28.4 - 34.8 g/dL    RDW 13.7 11.8 - 14.4 %    Platelets 601 936 - 065 k/uL    MPV 10.1 8.1 - 13.5 fL    NRBC Automated 0.0 0.0 per 100 WBC       Imaging/Diagnostics:  CT head without contrast    Result Date: 4/21/2022  1. Stable 6 mm intraparenchymal hemorrhage involving the posterior left putamen. This may be related to underlying hypertensive hemorrhage. 2. Stable cerebral parenchymal volume loss with chronic microvascular white matter ischemic disease. 3. No new areas of hemorrhage. CT Head WO Contrast    Result Date: 4/21/2022  5 mm left basal ganglial hemorrhage. Otherwise chronic microvascular disease and involutional changes. Critical results were called by Dr. Dale Sharpe to [facility] on 4/21/2022 at 01:06. XR CHEST PORTABLE    Result Date: 4/22/2022  Stable cardiomegaly. Mild central vascular congestion without overt edema or effusion. XR CHEST PORTABLE    Result Date: 4/21/2022  1. Cardiomegaly with increasing pulmonary vascular congestion and atelectasis in the left lung base. CTA head neck with contrast    Result Date: 4/21/2022  Unremarkable CTA of the head and neck. Assessment:  Primary Problem  Intraparenchymal hemorrhage of brain [facility])    Active Hospital Problems    Diagnosis Date Noted    History of blood clots: On Eliquis [Z86.718] 04/22/2022     Priority: Medium    Intraparenchymal hemorrhage of brain ([facility] [I61.9] 04/21/2022     Priority: Medium    Basal ganglia hemorrhage ([address] [I61.0] 04/21/2022     Priority: Medium    Gastrointestinal stromal tumor (GIST) ([address] [C49. A0]     History of pulmonary embolism [Z86.711]     Pancreatic mass [K86.89] 04/04/2021    Avascular necrosis of left femur ([address]

## 2022-04-22 NOTE — CARE COORDINATION
Case Management Initial Discharge Plan  Emi Daniels,             Met with:patient to discuss discharge plans. Information verified: address, contacts, phone number, , insurance Yes  Insurance Provider: [employer]    Emergency Contact/Next of Kin name & number: Sharon Rosales [phone]  Who are involved in patient's support system? Sister     PCP: Johana Worley MD  Date of last visit: Beginning of March      Discharge Planning    Living Arrangements:   Came from [facility], homeless, \"trying to be permanent\"     Home is a SNF    Patient able to perform ADL's:Assisted    Current Services (outpatient & in home)   DME equipment: walker  DME provider:     Is patient receiving oral anticoagulation therapy? No    If indicated:   Physician managing anticoagulation treatment:   Where does patient obtain lab work for ATC treatment? Does patient have any issues/concerns obtaining medications? No  If yes, what are patient's concerns? Is there a preferred Pharmacy after hours or on weekends? No    If yes, which pharmacy? Potential Assistance Needed:       Patient agreeable to home care: N/A  Freedom of choice provided:  n/a    Prior SNF/Rehab Placement and Facility: [place] [id number] to SNF/Rehab: Yes  [place] of choice provided: yes     Evaluation: no    Expected Discharge date:   22    Patient expects to be discharged to:   Return to [place], verified with Belkis Villagomez that she is a bed hold with no precert needed    If home: is the family and/or caregiver wiling & able to provide support at home? Yes   Who will be providing this support?  Sister and son    Follow Up Appointment: Best Day/ Time:      Transportation provider: Needs transport  Transportation arrangements needed for discharge: Yes    Readmission Risk              Risk of Unplanned Readmission:  37             Does patient have a readmission risk score greater than 14?: Yes  If yes, follow-up appointment must be made within 7 days of discharge.      Goals of Care: Safe discharge plan,   Educated patient on transitional options, provided freedom of choice      Educated patient on transitional options, provided freedom of choice and are agreeable with plan      Discharge Plan: Return to . Gideon Rivera signed by Sherita Pruett RN on 4/22/22 at 1:25 PM EDT

## 2022-04-22 NOTE — PROGRESS NOTES
Daily Progress Note  Neuro Critical Care    Patient Name: Mai Car  Patient : 1955  Room/Bed: 0522/0522-01  Code Status: FULL code   Allergies: Allergies   Allergen Reactions    Aspirin Itching    Keflet [Cephalexin]      Tolerated penicillin V , tolerates cephalexin        CHIEF COMPLAINT:     Altered mental status 22  INTERVAL HISTORY    Initial Presentation (Admitted 22): History Obtained From: Patient and EMR     The patient is a 69 y.o. female presented with altered mental status from nursing facility to [facility]. Patient reportedly on Plavix for history of cardiac stent, as well as Eliquis for history of PE. Patient was reportedly out to dinner for her birthday and had an episode of unresponsiveness thereafter. Evaluation in the ED there revealed 5 mm left basal ganglia hemorrhage. Patient is currently awake and alert and has no complaints at this time.     ICH score: 0     Admitted to ICU From: ED   Reason for ICU Admission: Neurochecks and BP monitoring    Hospital Course:   -Repeat CT head WO stable. CTA head and neck unremarkable. Continue close BP monitoring goal <160. Last 24 hours- vitals stable overnight -176, HR 73-82 tmax-98. 6. CBC and BMP stable unremarkable. Blood pressure elevated this morning appears chronic pain control is difficult with this patient currently receiving Roxicodone 10mg Q4 hours PRN and has received 4 doses since yesterday morning. Complaining of left hip pain uncontrolled in her bed will attempt fentanyl 50 IV once this AM to see if this helps to control her BP. Increasing coreg to 12.5mg BID. BP did not respond to the fentanyl patient appeared comfortable sleeping after dose thus will give IV hydralazine 10mg PRN.      CURRENT MEDICATIONS:  SCHEDULED MEDICATIONS:   carvedilol  12.5 mg Oral BID WC    amitriptyline  10 mg Oral Nightly    atorvastatin  40 mg Oral Nightly    famotidine  20 mg Oral Daily     CONTINUOUS INFUSIONS:    PRN MEDICATIONS:   acetaminophen, ondansetron **OR** ondansetron, albuterol sulfate HFA, ipratropium-albuterol, oxyCODONE **OR** oxyCODONE, labetalol    VITALS:  Temperature Range: Temp: 98.4 °F (36.9 °C) Temp  Av.3 °F (36.8 °C)  Min: 98 °F (36.7 °C)  Max: 98.6 °F (37 °C)  BP Range: Systolic (52SHT), WON:641 , Min:103 , VML:108     Diastolic (76KHP), QNY:38, Min:49, Max:129    Pulse Range: Pulse  Av.8  Min: 65  Max: 82  Respiration Range: Resp  Av.3  Min: 13  Max: 29  Current Pulse Ox: SpO2: 97 %  24HR Pulse Ox Range: SpO2  Av.9 %  Min: 89 %  Max: 97 %  Patient Vitals for the past 12 hrs:   BP Temp Temp src Pulse Resp SpO2   22 0600 -- -- -- 73 16 97 %   22 0500 (!) 158/98 -- -- 75 15 93 %   22 0400 (!) 176/81 98.4 °F (36.9 °C) Oral 73 19 94 %   22 0300 -- -- -- 76 17 95 %   22 0200 -- -- -- 73 20 97 %   22 0100 -- -- -- 75 29 95 %   22 0000 (!) 124/53 98.2 °F (36.8 °C) Oral 74 21 96 %   22 2300 -- -- -- 78 22 (!) 89 %   22 2230 (!) 134/59 -- -- 79 16 96 %   22 2200 -- -- -- 81 18 92 %   22 2100 -- -- -- 82 24 (!) 89 %   22 2000 (!) 159/69 98.6 °F (37 °C) Oral 78 17 94 %   22 1900 (!) 149/63 -- -- 80 21 92 %     Estimated body mass index is 20.2 kg/m² as calculated from the following:    Height as of 22: 4' 11\" (1.499 m).     Weight as of 22: 100 lb (45.4 kg).  []<16 Severe malnutrition  []16-16.99 Moderate malnutrition  []17-18.49 Mild malnutrition  []18.5-24.9 Normal  [x]25-29.9 Overweight (not obese)  []30-34.9 Obese class 1 (Low Risk)  []35-39.9 Obese class 2 (Moderate Risk)  []?40 Obese class 3 (High Risk)    RECENT LABS:   Lab Results   Component Value Date    WBC 8.9 2022    HGB 11.7 (L) 2022    HCT 38.4 2022     2022    CHOL 182 2020    TRIG 121 2020    HDL 77 2020    ALT 41 (H) 2022    AST 57 (H) 2022     2022    K Bicep:  1+  Left Bicep:  1+  Right Knee:  1+  Left Knee:  1+    Plantar Response:  Right:  downgoing  Left:  downgoing    Clonus:  absent  Fatima's:  absent    Coordination/Dysmetria:  Heel to Shin:  Right:  normal  Left:  normal  Finger to Nose:   Right:  normal  Left:  normal        DRAINS:  [x] There are no drains for Neuro Critical Care to monitor at this time. ASSESSMENT AND PLAN:     Emi is a 68 YO  [place] Female who presents 4/21/22 with acute left [place] BG ICH. PMH significant for HTN, HLD, COPD, CAD with CHF on plavix, Chronic Hep C. Patient was on eliquis 5mg BID prior for history of PE currently held. Patient currently non-focal at her baseline and has been stable. Has difficult to control pain and harsh cough severe COPD. Will transfer out of NICU this morning and transition care to internal medicine team.     NEUROLOGIC:  - Imaging   CT head WO 4/21-->acute left BG IPH 5mm  CTA head and neck 4/21-->unremarkable  CT head WO repeat-->stable   -Routine EEG 4/21/22--> disorganized and slow background suggest mild to moderate encephalopathy.   - okay to begin DVT ppx  - holding eliquis 5bid 18-21 days will need repeat CT head prior to initiation must weight risk vs benefits before resuming   - Goal SBP <160   - Neuro checks per protocol    CARDIOVASCULAR:  - Goal SBP <160 aim for normotensive in next 24 hours   - increasing home dose coreg 6.25mg BID to 12.5mg BID   -has PRN labetalol and hydralazine will have medicine assist in BP management and optimizing her home medications.   -Cardiac ECHO 11/13/2019  LVEF 69% hyperdynamic, Grade 1 LV diastolic CHF, left atrium mildly dilated. - Continue telemetry    PULMONARY:  -baseline COPD continue home treatments   - CXR     RENAL/FLUID/ELECTROLYTE:  - BUN 14/ Creatinine . 49  - Replace electrolytes PRN  - Daily BMP    GI/NUTRITION:  NUTRITION:  ADULT DIET;  Regular  - Bowel regimen: glycolax  - GI prophylaxis: Pepcid     ID:  - Tmax 98.6  - WBC 8.9  - Infectious work up   [employer] negative   UA with microscopy small LE, 5-10 WBC culture pending  - Continue to monitor for fevers  - Daily CBC    HEME:   - H&H 11.7/38.4  - Platelets 812  - Daily CBC    ENDOCRINE:  - Continue to monitor blood glucose, goal <180  - [id number]    OTHER:  - PT/OT/ST   - Code Status: FULL     PROPHYLAXIS:  Stress ulcer: H2 blocker    DVT PROPHYLAXIS:  - SCD sleeves - Thigh High   - ISELA stockings - Thigh High  - OK for primary team to initiate chemoprophylaxis at this time. - home plavix being held on it for CAD and peripheral vascular disease     DISPOSITION:    [x] OK for out of ICU from Neuro Critical Care standpoint    We will continue to follow along. For any changes in exam or patient status please contact Neuro Critical Care.       Devante Smith MD   PGY-3 Neurology Resident   Neuro Critical Care  Pager [phone]  4/22/2022     6:17 AM

## 2022-04-22 NOTE — PLAN OF CARE
Problem: Discharge Planning  Goal: Discharge to home or other facility with appropriate resources  4/22/2022 0851 by Alexus Salinas RN  Outcome: Progressing  4/21/2022 2143 by Kirsten French RN  Outcome: Progressing     Problem: Safety - Adult  Goal: Free from fall injury  4/22/2022 0851 by Alexus Salinas RN  Outcome: Progressing  4/21/2022 2143 by Kirsten French RN  Outcome: Progressing     Problem: Chronic Conditions and Co-morbidities  Goal: Patient's chronic conditions and co-morbidity symptoms are monitored and maintained or improved  4/22/2022 0851 by Alexus Salinas RN  Outcome: Progressing  4/21/2022 2143 by Kirsten French RN  Outcome: Progressing     Problem: Pain  Goal: Verbalizes/displays adequate comfort level or baseline comfort level  4/22/2022 0851 by Alexus Salinas RN  Outcome: Progressing  4/21/2022 2143 by Kirsten French RN  Outcome: Not Progressing     Problem: Skin/Tissue Integrity  Goal: Absence of new skin breakdown  Description: 1. Monitor for areas of redness and/or skin breakdown  2. Assess vascular access sites hourly  3. Every 4-6 hours minimum:  Change oxygen saturation probe site  4. Every 4-6 hours:  If on nasal continuous positive airway pressure, respiratory therapy assess nares and determine need for appliance change or resting period.   4/22/2022 0851 by Alexus Salinas RN  Outcome: Progressing  4/21/2022 2143 by Kirsten French RN  Outcome: Progressing     Problem: ABCDS Injury Assessment  Goal: Absence of physical injury  4/22/2022 0851 by Alexus Salinas RN  Outcome: Progressing  4/21/2022 2143 by Kirsten French RN  Outcome: Progressing

## 2022-04-22 NOTE — PROGRESS NOTES
Speech Language Pathology  Facility/Department: [facility]  Initial Speech/Language/Cognitive Assessment    NAME: Emi Daniels  : 1955   MRN: [id number]  ADMISSION DATE: 2022  ADMITTING DIAGNOSIS: has Essential hypertension; Hepatitis C; History of substance abuse ([facility]); Chronic heart failure with reduced ejection fraction and diastolic dysfunction ([facility]); Alcoholic cirrhosis of liver without ascites ([facility]); PUD (peptic ulcer disease); Anemia; Duodenal ulcer with hemorrhage; Psychosis, intermittent requring antipsychotic meds; Opiate withdrawal ([facility]); Pneumonia; Pancreas head lesion; Polysubstance abuse ([facility]); Right flank pain; Avascular necrosis of bone of right hip ([facility]); Closed fracture of head of right femur ([facility]); COPD without exacerbation ([facility]); Hypokalemia; History of hepatitis C; Avascular necrosis ([facility]); History of total right hip replacement; Protein calorie malnutrition ([facility]); Chronic diastolic heart failure ([facility]); Right sided weakness; Chronic daily headache; Memory changes; Generalized weakness; Stroke with cerebral ischemia ([facility]); SOB (shortness of breath); Avascular necrosis of left femur ([facility]); Pulmonary embolism on right [facility]); Multiple subsegmental pulmonary emboli without acute cor pulmonale ([facility]); Acute on chronic blood loss anemia; Severe comorbid illness; Primary osteoarthritis of left hip; Chronic anticoagulation; Chronic idiopathic constipation; DDD (degenerative disc disease), lumbar; Medication management; Pancreatic mass; Homeless; Colitis; Epigastric pain; Right-sided chest wall pain; CRYSTAL (acute kidney injury) ([facility]); H/O malignant gastrointestinal stromal tumor (GIST); Gastrointestinal stromal tumor (GIST) ([facility]); Hypoglycemia; History of pulmonary embolism; Noncompliance; Crack cocaine use; Moderate episode of recurrent major depressive disorder ([facility]); Atelectasis; Abdominal pain; Migraine; Migraine variants, not intractable; Pain of right hip joint; Arthritis;  Hip arthritis; Migraine aura without headache; Social problem; Soft tissue tumor, malignant ([address] Unable to care for self; Intraparenchymal hemorrhage of brain ([address] and Basal ganglia hemorrhage ([address] on their problem list.      Date of Eval: 4/22/2022   Evaluating Therapist: Joe Lopez    Primary Complaint:   The patient is a 69 y.o. female presented with altered mental status from nursing facility to [facility]. Patient reportedly on Plavix for history of cardiac stent, as well as Eliquis for history of PE. Patient was reportedly out to dinner for her birthday and had an episode of unresponsiveness thereafter. Evaluation in the ED there revealed 5 mm left basal ganglia hemorrhage. Patient is currently awake and alert and has no complaints at this time. Pain:  Pain Assessment  Pain Assessment: None - Denies Pain  Pain Level: 10  Patient's Stated Pain Goal: 0 - No pain  Pain Location: Head  Pain Orientation: Left  Pain Descriptors: Sharp,Aching  Pain Type: Chronic pain  Non-Pharmaceutical Pain Intervention(s): Emotional support,Repositioned  Response to Pain Intervention: None (pain unchanged or no improvement)  Pain Assessment in Advanced Dementia (PAINAD)  Non-Pharmaceutical Pain Intervention(s): Emotional support,Repositioned  Response to Pain Intervention: None (pain unchanged or no improvement)  Faces, Legs, Activity, Cry, and Consolability (FLACC)  Non-Pharmaceutical Pain Intervention(s): Emotional support,Repositioned  Response to Pain Intervention: None (pain unchanged or no improvement)    Assessment:  Pt presents with mild-severe cognitive deficits characterized by difficulties with short term recall of 3 units, immediate recall of  5 units, antonyms, similarities/differences, inductive reasoning, deductive reasoning, task insight, and thought flexibility, and generative naming. Pt. Presents with no dysarthria, no O/M deficits at this time.  ST to follow up and provide treatment to address noted deficits. Education provided. Recommendations:  Requires SLP Intervention: Yes  Duration of Treatment: 3-5x per week  D/C Recommendations: Ongoing speech therapy is recommended during this hospitalization; Ongoing speech therapy is recommended at next level of care       Plan:   Goals:  Short-term Goals  Goal 1: Pt will recall 3-5 units with and without distractions with 90% accuracy. Goal 2: Pt will utilize memory compensatory strategies to aid in recall. Goal 3: Pt will complete thought organizational tasks with 90% accuracy. Goal 4: Pt will complete abstract reasoning/deductive reasoning tasks with 90% accuracy. Goal 5: Pt will generate 6-8 units in a category with 90% accuracy. Patient/family involved in developing goals and treatment plan: Yes    Subjective:     General  Chart Reviewed: Yes  Patient assessed for rehabilitation services?: Yes  Family / Caregiver Present: No     Vision  Vision: Within Functional Limits  Hearing  Hearing: Within functional limits           Objective:     Expression  Primary Mode of Expression: Verbal     Pragmatics/Social Functioning  Pragmatics: Within functional limits    Cognition:      Orientation  Overall Orientation Status: Within Normal Limits  Attention  Attention: Within Functional Limits  Memory  Memory: Exceptions to [facility] [place]  Short-term Memory: Severe (0/3 improve to 3/3 with min-mod cues. 0/3)  Immediate Recall: Mild (3/3, 4/5, 3/3)  Problem Solving  Problem Solving: Exceptions to [facility] [place]  Verbal Reasoning Skills: Moderate (Antonyms 1/3, Inductive Reasoning 3/4, Similarities/Differences 1/2, Deductive Reasoning 1/3)  Abstract Reasoning  Abstract Reasoning: Exceptions to [facility] [place]  Divergent Thinking:  Moderate (+3 in 30 seconds)  Safety/Judgment  Safety/Judgment: Exceptions to [facility] [place]  Insight: Mild   (2/3)  Flexibility of Thought: Moderate (1/3)  Word Associations: WFL  Verbal Sequencing: Mild (3/4)           Prognosis:  Speech Therapy Prognosis  Prognosis: Fair  Individuals consulted  Consulted and agree with results and recommendations: Patient    Education:  Patient Education: Yes  Patient Education Response: Verbalizes understanding          Therapy Time:   Individual Concurrent Group Co-treatment   Time In  0913         Time Out  4844         Minutes  10                 Completed by Yosi Gallardo,  Clinician  Co-signed by Jl Moya A.CCC/SLP    4/22/2022 9:33 AM

## 2022-04-22 NOTE — PROGRESS NOTES
Pharmacy Note     Enoxaparin Dose Adjustment    Emi Daniels is a 69 y.o. female. Pharmacist assessment of enoxaparin dose for VTE prophylaxis. Recent Labs     04/21/22  0127 04/22/22  0335   BUN 14 14       Recent Labs     04/21/22  0127 04/22/22  0335   CREATININE 0.60 0.49*       Estimated Creatinine Clearance: 78 mL/min (A) (based on SCr of 0.49 mg/dL (L)).       Height:   Ht Readings from Last 1 Encounters:   04/20/22 4' 11\" (1.499 m)     Weight:  Wt Readings from Last 1 Encounters:   04/20/22 100 lb (45.4 kg)       *Reason for admission/problem list does not contraindicate anticoagulation*    The ordered enoxaparin dose of 40mg daily has been adjusted based upon renal function and/or patient weight per P&T Guidelines to: 30 mg daily    -Randall Lee PharmD, [facility] 4/22/2022 9:32 AM

## 2022-04-22 NOTE — PROGRESS NOTES
Occupational Therapy  Facility/Department: [address]  Occupational Therapy Daily Treatment Note    Name: Jacky Toussaint  : 1955  MRN: [id number]  Date of Service: 2022    Discharge Recommendations:  Patient would benefit from continued therapy after discharge     Patient Diagnosis(es): The encounter diagnosis was Intraparenchymal hemorrhage of brain ([facility] Assessment   Performance deficits / Impairments: Decreased functional mobility ; Decreased ADL status; Decreased safe awareness;Decreased cognition;Decreased endurance;Decreased high-level IADLs  Assessment: Pt required MAX encouragment to complete further therapy with patient deny further activity due to pain and weakness  Prognosis: Good  Activity Tolerance  Activity Tolerance: Patient limited by pain  Activity Tolerance: Pt decreased willingness for further out of bed activity      Safety Devices  Safety Devices in place: Yes  Type of devices: Call light within reach; Left in bed; Nurse Notified  Restraints  Initially in place: No   Patient Education: Pt educated on safety, importance of all therapy participation in therapy, transfer training, ADL tasks with fair- carryover to tasks      Plan   Plan  Times per Week: 3-4x/wk  Current Treatment Recommendations: Functional mobility training,Endurance training,Balance training,Safety education & training,Patient/Caregiver education & training,Equipment evaluation, education, & procurement,Self-Care / ADL,Home management training     Restrictions  Restrictions/Precautions  Restrictions/Precautions: Fall Risk,General Precautions  Required Braces or Orthoses?: No  Position Activity Restriction  Other position/activity restrictions: up with assistance; SBP <140    Subjective   General  Patient assessed for rehabilitation services?: Yes  Response to previous treatment: Patient with no complaints from previous session  Family / Caregiver Present: No  Subjective  Subjective: RN ok'd patient for OT treatment this morning. Pt sitting up on EOB upon arrival. Pt c/o pain and decreased strength. Pt required max encouragment for participation other than using toilet  General Comment  Comments: RN ok'f or OT carole this PM w/ significant encouragement. Pt easily frustrated. Cooperative throughout with encouragement. Pain Assessment  Pain Assessment: None - Denies Pain  Response to Pain Intervention: None (pain unchanged or no improvement)  Pre Treatment Pain Screening  Pain at present:  (Pt unable to rate)  Intervention List: Patient unable to be redirected to participate in therapy  Comments / Details: Pt c/o chest pain and pancreas pain. RN is aware    Objective     Balance  Sitting Balance: Modified independent   Standing Balance: Stand by assistance  Standing Balance  Time: 20 seconds x2 stands  Activity: clothing mgt, toileting task  Comment: No LOB/unsteadiness  Functional Mobility  Functional - Mobility Device: No device  Activity: Other (to/from [facility] 3-4')  Functional Mobility Comments: Declines further activity  Toilet Transfers  Toilet - Technique: Ambulating  Equipment Used: Standard bedside commode  Toilet Transfer: Stand by assistance  Toilet Transfers Comments: Impulsive with movements  ADL  Toileting: Stand by assistance;Supervision  Additional Comments: Pt willing to complete toileting task with SBA>Supervision for safety. Pt declines any further treatment/activity   Bed mobility  Rolling to Left: Independent  Rolling to Right: Independent  Supine to Sit: Independent  Sit to Supine: Independent  Scooting: Independent  Comment: HOB flat, no rail use  Transfers  Sit to stand: Stand by assistance  Stand to sit: Stand by assistance  Transfer Comments: No AD, impulsive with movements  Cognition  Overall Cognitive Status: Exceptions  Arousal/Alertness: Appropriate responses to stimuli  Following Commands:  Follows one step commands consistently  Attention Span: Appears intact  Safety Judgement: Decreased awareness of need for assistance;Decreased awareness of need for safety  Insights: Decreased awareness of deficits  Initiation: Requires cues for some  Sequencing: Does not require cues  Cognition Comment: Pt very focused on pain and crying out for RN constantly.  RN has addressed patient this morning     AM-PAC Score  AM-PAC Inpatient Daily Activity Raw Score: 20 (04/22/22 0908)  AM-PAC Inpatient ADL T-Scale Score : 42.03 (04/22/22 0908)  ADL Inpatient CMS 0-100% Score: 38.32 (04/22/22 0908)  ADL Inpatient CMS G-Code Modifier : CJ (04/22/22 2540)    Goals  Short Term Goals  Time Frame for Short term goals: By discharge, pt will:  Short Term Goal 1: Demo functional sit<>stand transfers and functional mobility with SUP and LRD PRN  Short Term Goal 2: Demo 8 minutes of dynamic standing balance with SBA to promote increased engagement in ADLs  Short Term Goal 3: Demo UB ADL with Mod IND  Short Term Goal 4: Demo LB ADLs/toileting with SUP and DME PRN  Short Term Goal 5: Demo 25 minutes of activity tolerance to promote increased endurance throughout all ADLs       Therapy Time   Individual Concurrent Group Co-treatment   Time In 0830         Time Out 0845         Minutes 15         Timed Code Treatment Minutes: [address], TIM/L

## 2022-04-22 NOTE — PLAN OF CARE
Problem: Discharge Planning  Goal: Discharge to home or other facility with appropriate resources  4/22/2022 1259 by Elizabeth Ferro RN  Outcome: Progressing  4/22/2022 0851 by Ethan Fernandez RN  Outcome: Progressing     Problem: Safety - Adult  Goal: Free from fall injury  4/22/2022 1259 by Elizabeth Ferro RN  Outcome: Progressing  4/22/2022 0851 by Ethan Fernandez RN  Outcome: Progressing     Problem: Chronic Conditions and Co-morbidities  Goal: Patient's chronic conditions and co-morbidity symptoms are monitored and maintained or improved  4/22/2022 1259 by Elizabeth Ferro RN  Outcome: Progressing  4/22/2022 0851 by Ethan Fernandez RN  Outcome: Progressing     Problem: Pain  Goal: Verbalizes/displays adequate comfort level or baseline comfort level  4/22/2022 1259 by Elizabeth Ferro RN  Outcome: Progressing  4/22/2022 0851 by Ethan Fernandez RN  Outcome: Progressing     Problem: ABCDS Injury Assessment  Goal: Absence of physical injury  4/22/2022 1259 by Elizabeth Ferro RN  Outcome: Progressing  4/22/2022 0851 by Ethan Fernandez RN  Outcome: Progressing     Problem: Skin/Tissue Integrity  Goal: Absence of new skin breakdown  Description: 1. Monitor for areas of redness and/or skin breakdown  2. Assess vascular access sites hourly  3. Every 4-6 hours minimum:  Change oxygen saturation probe site  4. Every 4-6 hours:  If on nasal continuous positive airway pressure, respiratory therapy assess nares and determine need for appliance change or resting period.   4/22/2022 1259 by Elizabeth Ferro RN  Outcome: Progressing  4/22/2022 0851 by Ethan Fernandez RN  Outcome: Progressing

## 2022-04-23 LAB
ANION GAP SERPL CALCULATED.3IONS-SCNC: 12 MMOL/L (ref 9–17)
BUN BLDV-MCNC: 13 MG/DL (ref 8–23)
CALCIUM SERPL-MCNC: 9.1 MG/DL (ref 8.6–10.4)
CHLORIDE BLD-SCNC: 107 MMOL/L (ref 98–107)
CO2: 20 MMOL/L (ref 20–31)
CREAT SERPL-MCNC: 0.47 MG/DL (ref 0.5–0.9)
GFR AFRICAN AMERICAN: >60 ML/MIN
GFR NON-AFRICAN AMERICAN: >60 ML/MIN
GFR SERPL CREATININE-BSD FRML MDRD: ABNORMAL ML/MIN/{1.73_M2}
GLUCOSE BLD-MCNC: 97 MG/DL (ref 70–99)
HCT VFR BLD CALC: 40.5 % (ref 36.3–47.1)
HEMOGLOBIN: 13 G/DL (ref 11.9–15.1)
MCH RBC QN AUTO: 34.7 PG (ref 25.2–33.5)
MCHC RBC AUTO-ENTMCNC: 32.1 G/DL (ref 28.4–34.8)
MCV RBC AUTO: 108 FL (ref 82.6–102.9)
NRBC AUTOMATED: 0 PER 100 WBC
PDW BLD-RTO: 13.5 % (ref 11.8–14.4)
PLATELET # BLD: 387 K/UL (ref 138–453)
PMV BLD AUTO: 9.5 FL (ref 8.1–13.5)
POTASSIUM SERPL-SCNC: 4.8 MMOL/L (ref 3.7–5.3)
PRO-BNP: 370 PG/ML
RBC # BLD: 3.75 M/UL (ref 3.95–5.11)
SODIUM BLD-SCNC: 139 MMOL/L (ref 135–144)
WBC # BLD: 6.1 K/UL (ref 3.5–11.3)

## 2022-04-23 PROCEDURE — 36415 COLL VENOUS BLD VENIPUNCTURE: CPT

## 2022-04-23 PROCEDURE — 99232 SBSQ HOSP IP/OBS MODERATE 35: CPT | Performed by: STUDENT IN AN ORGANIZED HEALTH CARE EDUCATION/TRAINING PROGRAM

## 2022-04-23 PROCEDURE — 6370000000 HC RX 637 (ALT 250 FOR IP): Performed by: STUDENT IN AN ORGANIZED HEALTH CARE EDUCATION/TRAINING PROGRAM

## 2022-04-23 PROCEDURE — 97116 GAIT TRAINING THERAPY: CPT

## 2022-04-23 PROCEDURE — 83880 ASSAY OF NATRIURETIC PEPTIDE: CPT

## 2022-04-23 PROCEDURE — 2700000000 HC OXYGEN THERAPY PER DAY

## 2022-04-23 PROCEDURE — 2060000000 HC ICU INTERMEDIATE R&B

## 2022-04-23 PROCEDURE — 6370000000 HC RX 637 (ALT 250 FOR IP): Performed by: INTERNAL MEDICINE

## 2022-04-23 PROCEDURE — 97130 THER IVNTJ EA ADDL 15 MIN: CPT

## 2022-04-23 PROCEDURE — 85027 COMPLETE CBC AUTOMATED: CPT

## 2022-04-23 PROCEDURE — 97110 THERAPEUTIC EXERCISES: CPT

## 2022-04-23 PROCEDURE — 97129 THER IVNTJ 1ST 15 MIN: CPT

## 2022-04-23 PROCEDURE — 6360000002 HC RX W HCPCS: Performed by: STUDENT IN AN ORGANIZED HEALTH CARE EDUCATION/TRAINING PROGRAM

## 2022-04-23 PROCEDURE — 94640 AIRWAY INHALATION TREATMENT: CPT

## 2022-04-23 PROCEDURE — 80048 BASIC METABOLIC PNL TOTAL CA: CPT

## 2022-04-23 RX ORDER — LOSARTAN POTASSIUM 25 MG/1
25 TABLET ORAL DAILY
Status: DISCONTINUED | OUTPATIENT
Start: 2022-04-23 | End: 2022-04-24

## 2022-04-23 RX ORDER — MULTIVITAMIN WITH IRON
1 TABLET ORAL DAILY
Status: DISCONTINUED | OUTPATIENT
Start: 2022-04-23 | End: 2022-04-24 | Stop reason: HOSPADM

## 2022-04-23 RX ORDER — MULTIVITAMIN WITH FOLIC ACID 400 MCG
1 TABLET ORAL DAILY
Status: DISCONTINUED | OUTPATIENT
Start: 2022-04-23 | End: 2022-04-23

## 2022-04-23 RX ORDER — GUAIFENESIN DEXTROMETHORPHAN HYDROBROMIDE ORAL SOLUTION 10; 100 MG/5ML; MG/5ML
10 SOLUTION ORAL EVERY 4 HOURS PRN
Status: DISCONTINUED | OUTPATIENT
Start: 2022-04-23 | End: 2022-04-24 | Stop reason: HOSPADM

## 2022-04-23 RX ADMIN — GABAPENTIN 300 MG: 600 TABLET ORAL at 13:18

## 2022-04-23 RX ADMIN — FAMOTIDINE 20 MG: 20 TABLET, FILM COATED ORAL at 08:14

## 2022-04-23 RX ADMIN — BUDESONIDE AND FORMOTEROL FUMARATE DIHYDRATE 2 PUFF: 160; 4.5 AEROSOL RESPIRATORY (INHALATION) at 07:56

## 2022-04-23 RX ADMIN — GABAPENTIN 300 MG: 600 TABLET ORAL at 08:14

## 2022-04-23 RX ADMIN — GUAIFENESIN DEXTROMETHORPHAN HYDROBROMIDE ORAL SOLUTION 10 ML: 200; 20 SOLUTION ORAL at 13:15

## 2022-04-23 RX ADMIN — ALCOHOL 1 TABLET: 70.47 GEL TOPICAL at 13:14

## 2022-04-23 RX ADMIN — ENOXAPARIN SODIUM 30 MG: 100 INJECTION SUBCUTANEOUS at 08:13

## 2022-04-23 RX ADMIN — CARVEDILOL 12.5 MG: 12.5 TABLET, FILM COATED ORAL at 17:13

## 2022-04-23 RX ADMIN — GABAPENTIN 300 MG: 600 TABLET ORAL at 20:48

## 2022-04-23 RX ADMIN — CARVEDILOL 12.5 MG: 12.5 TABLET, FILM COATED ORAL at 08:14

## 2022-04-23 RX ADMIN — OXYCODONE HYDROCHLORIDE AND ACETAMINOPHEN 2 TABLET: 5; 325 TABLET ORAL at 11:33

## 2022-04-23 RX ADMIN — OXYCODONE HYDROCHLORIDE AND ACETAMINOPHEN 2 TABLET: 5; 325 TABLET ORAL at 20:50

## 2022-04-23 RX ADMIN — MIRTAZAPINE 30 MG: 30 TABLET, FILM COATED ORAL at 20:48

## 2022-04-23 RX ADMIN — AMITRIPTYLINE HYDROCHLORIDE 10 MG: 10 TABLET, FILM COATED ORAL at 20:48

## 2022-04-23 RX ADMIN — LOSARTAN POTASSIUM 25 MG: 25 TABLET, FILM COATED ORAL at 13:14

## 2022-04-23 RX ADMIN — DESMOPRESSIN ACETATE 40 MG: 0.2 TABLET ORAL at 20:48

## 2022-04-23 RX ADMIN — BUDESONIDE AND FORMOTEROL FUMARATE DIHYDRATE 2 PUFF: 160; 4.5 AEROSOL RESPIRATORY (INHALATION) at 20:10

## 2022-04-23 ASSESSMENT — PAIN SCALES - GENERAL
PAINLEVEL_OUTOF10: 8
PAINLEVEL_OUTOF10: 9
PAINLEVEL_OUTOF10: 9

## 2022-04-23 ASSESSMENT — PAIN DESCRIPTION - LOCATION
LOCATION: ABDOMEN
LOCATION: ABDOMEN

## 2022-04-23 ASSESSMENT — ENCOUNTER SYMPTOMS: TACHYPNEA: 1

## 2022-04-23 NOTE — PLAN OF CARE
Problem: Discharge Planning  Goal: Discharge to home or other facility with appropriate resources  4/23/2022 0507 by Fede Luke RN  Outcome: Progressing  4/23/2022 0507 by Fede Luke RN  Outcome: Progressing     Problem: Safety - Adult  Goal: Free from fall injury  4/23/2022 0507 by Fede Luke RN  Outcome: Progressing  4/23/2022 0507 by Fede Luke RN  Outcome: Progressing     Problem: Chronic Conditions and Co-morbidities  Goal: Patient's chronic conditions and co-morbidity symptoms are monitored and maintained or improved  4/23/2022 0507 by Fede Luke RN  Outcome: Progressing  4/23/2022 0507 by Fede Luke RN  Outcome: Progressing     Problem: Pain  Goal: Verbalizes/displays adequate comfort level or baseline comfort level  4/23/2022 0507 by Fede Luke RN  Outcome: Progressing  4/23/2022 0507 by Fede Luke RN  Outcome: Progressing     Problem: Skin/Tissue Integrity  Goal: Absence of new skin breakdown  Description: 1. Monitor for areas of redness and/or skin breakdown  2. Assess vascular access sites hourly  3. Every 4-6 hours minimum:  Change oxygen saturation probe site  4. Every 4-6 hours:  If on nasal continuous positive airway pressure, respiratory therapy assess nares and determine need for appliance change or resting period.   4/23/2022 0507 by Fede Luke RN  Outcome: Progressing  4/23/2022 0507 by Fede Luke RN  Outcome: Progressing     Problem: ABCDS Injury Assessment  Goal: Absence of physical injury  4/23/2022 0507 by Fede Luke RN  Outcome: Progressing  4/23/2022 0507 by Fede Luke RN  Outcome: Progressing

## 2022-04-23 NOTE — PROGRESS NOTES
[facility]  Office: [address], DO, Fawn Lubna, DO, Magi Edwards, DO, Nataly Bee, DO, Nely Guzman MD, Malik Yun MD, Padmini Holt MD, Benny Zamorano MD, Edith Carlisle MD, Sherlyn Allen MD, Wanda Piña MD, Chris Castellano, DO, Spike Velazquez, DO, Joshua Whiteside MD,  Kristin Bridges, DO, Alpesh Novak MD, Alix Tucker MD, Sal Oakes MD, Donn Garcia DO, Elsi Sandoval MD, Cyndee Fuchs MD, Jacki Farmer, [facility], [facility] Por[facility], CNP, Erin Hernandez, CNP, Elliott Puga, CNP, Richie Pantoja, [facility], Artis Barrientos, CNP, Sabrina Sarmiento, PA-C, Arin Sharpe, DNP, Stefani Wilson, CNP, Jacklyn Frankel, CNP, Susan Jimenez, [facility], Suki Long, [facility], Hugo Phoenix, [facility], Liss Garza, CNP, Cathy Brewster, CNP, Keyon Bautista, [address] [place] [address]    Progress Note    4/23/2022    12:12 PM    Name:   Arnie Hobbs  MRN:     [id number]     Acct:      [de-identified]   Room:   [address] [place] [address] Day:  2  Admit Date:  4/21/2022  2:39 AM    PCP:   Akin Mendez MD  Code Status:  Full Code    Subjective:     C/C:   Chief Complaint   Patient presents with    Cerebrovascular Accident     Unknown last time well, arrived [facility] with LUKE     Interval History Status: improved. Patient seen and examined. Doing well. No complaints other tahn some hip pain that improves with pain medicaitons    Brief History:     26-year-old female past medical history of hypertension, stage Ia T2 pancreatic stromal tumor force and deemed poor surgical candidate, started on imatinib, history of PE, Hepatitis C, chronic pain presents with nausea vomiting headache found to have acute left intracerebral hemorrhage left basal ganglia hemorrhage. Patient remained in the ICU and blood pressure medications were adjusted. Per neuro critical care anticoagulation to be held, okay for DVT prophylaxis.   Plan for repeat CT head in approximately 3 weeks to reevaluate for hemorrhage before restarting Eliquis. Review of Systems:     Constitutional:  negative for chills, fevers, sweats  Respiratory:  negative for cough, dyspnea on exertion, shortness of breath, wheezing  Cardiovascular:  negative for chest pain, chest pressure/discomfort, lower extremity edema, palpitations  Gastrointestinal:  negative for abdominal pain, constipation, diarrhea, nausea, vomiting  Neurological:  negative for dizziness, headache    Medications: Allergies:     Allergies   Allergen Reactions    Aspirin Itching    Keflet [Cephalexin]      Tolerated penicillin V 12/13, tolerates cephalexin 6/14       Current Meds:   Scheduled Meds:    multivitamin  1 tablet Oral Daily    carvedilol  12.5 mg Oral BID WC    gabapentin  300 mg Oral TID    enoxaparin  30 mg SubCUTAneous Daily    morphine  4 mg IntraVENous Once    mirtazapine  30 mg Oral Nightly    budesonide-formoterol  2 puff Inhalation BID    amitriptyline  10 mg Oral Nightly    atorvastatin  40 mg Oral Nightly    famotidine  20 mg Oral Daily     Continuous Infusions:   PRN Meds: dextromethorphan-guaiFENesin, hydrALAZINE, oxyCODONE-acetaminophen, butalbital-acetaminophen-caffeine, ondansetron **OR** ondansetron, albuterol sulfate HFA, ipratropium-albuterol, labetalol    Data:     Past Medical History:   has a past medical history of Appendicitis, Cerebral artery occlusion with cerebral infarction ([address] CHF (congestive heart failure) ([address] Chronic respiratory failure with hypoxia and hypercapnia (HCC), Chronic rhinitis, Cigarette smoker, COPD (chronic obstructive pulmonary disease) ([address] Depression, Dysphagia, Essential hypertension, GERD (gastroesophageal reflux disease), Heart failure, diastolic, with acute decompensation ([address] Hepatitis C, chronic ([address] History of smoking at least 1 pack per day for at least 30 years, Hyperlipidemia, Hypertension, Migraine, Moderate COPD (chronic obstructive pulmonary disease) [facility]), Multiple transfusions, Neurogenic dysphagia, Osteoarthritis, Pneumonia, Scoliosis, and Substance abuse ([address] Social History:   reports that she has been smoking cigarettes. She started smoking about 52 years ago. She has a 20.00 pack-year smoking history. She has never used smokeless tobacco. She reports that she does not drink alcohol and does not use drugs. Family History:   Family History   Problem Relation Age of Onset    Breast Cancer Mother     Heart Disease Father        Vitals:  /64   Pulse 74   Temp 98.5 °F (36.9 °C) (Oral)   Resp 18   LMP 2015   SpO2 99%   Temp (24hrs), Av.3 °F (36.8 °C), Min:98.2 °F (36.8 °C), Max:98.5 °F (36.9 °C)    No results for input(s): POCGLU in the last 72 hours. I/O (24Hr):   No intake or output data in the 24 hours ending 22 1212    Labs:  Hematology:  Recent Labs     22  01222  0335 22  0525   WBC 12.0*  --  8.9 6.1   RBC 3.83*  --  3.50* 3.75*   HGB 13.1  --  11.7* 13.0   HCT 41.9  --  38.4 40.5   .4*  --  109.7* 108.0*   MCH 34.2*  --  33.4 34.7*   MCHC 31.3  --  30.5 32.1   RDW 13.9  --  13.7 13.5   *  --  407 387   MPV 10.8  --  10.1 9.5   INR  --  1.0  --   --      Chemistry:  Recent Labs     22  01222  0246 22  0335 22  0525     --  139 139   K 5.2  --  4.3 4.8     --  108* 107   CO2 20  --  22 20   GLUCOSE 109*  --  87 97   BUN 14  --  14 13   CREATININE 0.60  --  0.49* 0.47*   ANIONGAP 13  --  9 12   LABGLOM >60  --  >60 >60   GFRAA >60  --  >60 >60   CALCIUM 9.1  --  8.8 9.1   PROBNP  --   --   --  370*   TROPHS 11 10  --   --      Recent Labs     22  0127 22  1035   PROT 8.2  --    LABALBU 3.9  --    LABA1C  --  4.5   AST 57*  --    ALT 41*  --    ALKPHOS 139*  --    BILITOT 0.31  --      ABG:  Lab Results   Component Value Date    POCPH 7.366 2019    POCPCO2 51.0 2019    POCPO2 65.9 2019    POCHCO3 29.2 01/08/2019    NBEA NOT REPORTED 01/08/2019    PBEA 3 01/08/2019    CSI3HOT 31 01/08/2019    CBXK4BFP 92 01/08/2019    FIO2 NOT REPORTED 09/04/2019     Lab Results   Component Value Date/Time    SPECIAL NOT REPORTED 04/04/2021 12:09 AM     Lab Results   Component Value Date/Time    CULTURE NO SIGNIFICANT GROWTH 04/21/2022 03:46 AM       Radiology:  CT head without contrast    Result Date: 4/21/2022  1. Stable 6 mm intraparenchymal hemorrhage involving the posterior left putamen. This may be related to underlying hypertensive hemorrhage. 2. Stable cerebral parenchymal volume loss with chronic microvascular white matter ischemic disease. 3. No new areas of hemorrhage. CT Head WO Contrast    Result Date: 4/21/2022  5 mm left basal ganglial hemorrhage. Otherwise chronic microvascular disease and involutional changes. Critical results were called by Dr. Tigre Davison to [facility] on 4/21/2022 at 01:06. XR CHEST PORTABLE    Result Date: 4/22/2022  Stable cardiomegaly. Mild central vascular congestion without overt edema or effusion. XR CHEST PORTABLE    Result Date: 4/21/2022  1. Cardiomegaly with increasing pulmonary vascular congestion and atelectasis in the left lung base. CTA head neck with contrast    Result Date: 4/21/2022  Unremarkable CTA of the head and neck. MRI BRAIN WO CONTRAST    Result Date: 4/22/2022  1. Stable tiny focus of intraparenchymal hemorrhage involving the posterior left putamen. 2. Mild-to-moderate chronic white matter microvascular ischemic changes with multiple chronic lacunar infarcts in the bilateral basal ganglia, left thalamus, and right leandro. 3. Punctate foci of remote microhemorrhage in the bilateral basal ganglia, right thalamus, and leandro. These findings are most consistent with sequela of chronic hypertensive microangiopathy.        Physical Examination:        General appearance:  alert, cooperative chronically ill appearing  Mental Status:  oriented to person, place and time and normal affect  Lungs: decreased bialterally  Heart:  regular rate and rhythm, systolic murmur  Abdomen:  soft, nontender, nondistended, normal bowel sounds  Extremities:  no edema, redness, tenderness in the calves  Skin:  no gross lesions, rashes, induration    Assessment:        Hospital Problems           Last Modified POA    * (Principal) Intraparenchymal hemorrhage of brain ([address] 4/21/2022 Yes    Basal ganglia hemorrhage ([address] 4/21/2022 Yes    History of blood clots: On Eliquis 4/22/2022 Yes    Essential hypertension 4/22/2022 Yes    History of substance abuse ([address] 5/42/9087 Yes    Alcoholic cirrhosis of liver without ascites ([address] 4/22/2022 Yes    Anemia, macrocytic 4/22/2022 Yes    Avascular necrosis of bone of right hip ([address] 4/22/2022 Yes    COPD without exacerbation ([address] 4/22/2022 Yes    History of hepatitis C (Chronic) 4/22/2022 Yes    Overview Signed 8/11/2019  2:24 PM by Annita Doty, DO     Previously treated at [facility]         Chronic diastolic heart failure ([address] 4/22/2022 Yes    Avascular necrosis of left femur ([address] 4/22/2022 Yes    Chronic anticoagulation 4/22/2022 Yes    Pancreatic mass 4/22/2022 Yes    Gastrointestinal stromal tumor (GIST) ([address] 4/22/2022 Yes    History of pulmonary embolism 4/22/2022 Yes                      Plan:        1. Intraparenchymal hemorrhage of the basal ganglia. Appreciate neuro critical care recommendations. Hold on Eliquis. Aspirin and Plavix also held  2. Hypertension. Coreg adjusted, added Cozaar 25 mg  3. Chronic headache. Continue Elavil  4. COPD. Continue inhalers as needed  5. History of GIST tumor. Was evaluated by surgery unfortunate poor candidate. Has follow-up with oncology at [facility]  6. History of PE. Eliquis on hold  7. Depression, remeron  8. Bronchitis. Robitussin  9. Chronic back pain, continue percocet  10. History of hepatitis C. Follow-up with gastroenterology as outpatient  11. PT/OT  12.  Continue to monitor patient's progress planning to discharge back to skilled nursing facility.     Elise Mcallister MD  4/23/2022  12:12 PM

## 2022-04-23 NOTE — PROGRESS NOTES
Physical Therapy  Facility/Department: [address] STEP DOWN  Daily treatment note    Name: Jannet Rose  : 1955  MRN: [id number]  Date of Service: 2022    Discharge Recommendations:  Patient would benefit from continued therapy after discharge   PT Equipment Recommendations  Equipment Needed: Yes  Mobility Devices: Ana Flash: Rolling      Patient Diagnosis(es): The encounter diagnosis was Intraparenchymal hemorrhage of brain ([address] Past Medical History:  has a past medical history of Appendicitis, Cerebral artery occlusion with cerebral infarction ([address] CHF (congestive heart failure) ([address] Chronic respiratory failure with hypoxia and hypercapnia (HCC), Chronic rhinitis, Cigarette smoker, COPD (chronic obstructive pulmonary disease) ([address] Depression, Dysphagia, Essential hypertension, GERD (gastroesophageal reflux disease), Heart failure, diastolic, with acute decompensation ([address] Hepatitis C, chronic ([address] History of smoking at least 1 pack per day for at least 30 years, Hyperlipidemia, Hypertension, Migraine, Moderate COPD (chronic obstructive pulmonary disease) ([address] Multiple transfusions, Neurogenic dysphagia, Osteoarthritis, Pneumonia, Scoliosis, and Substance abuse ([address] Past Surgical History:  has a past surgical history that includes  section; LEEP (); Upper gastrointestinal endoscopy (); Gastric bypass surgery; Appendectomy (2017); laparoscopic appendectomy (N/A, 3/27/2017); Upper gastrointestinal endoscopy (2018); Upper gastrointestinal endoscopy (N/A, 2018); pr colonoscopy w/biopsy single/multiple (N/A, 11/10/2018); pr egd transoral biopsy single/multiple (N/A, 2018); hip surgery (Right, 2019); Total hip arthroplasty (Right, 2019); Medication Injection (Left, 3/25/2021); Upper gastrointestinal endoscopy (N/A, 2021); other surgical history (Left, 2021);  Medication Injection (Left, 2021); and Medication Injection (Left, 7/12/2021). Assessment   Body Structures, Functions, Activity Limitations Requiring Skilled Therapeutic Intervention: Decreased functional mobility ; Decreased endurance; Increased pain;Decreased posture;Decreased balance;Decreased safe awareness;Decreased strength;Decreased ADL status  Assessment: The pt requires SBA for bed mobility this date, and CGA for transfers and ambulation of 10x2 and 20ft with RW. The pt reports being independent with mobility prior to this admission and would benefit from continued physical therapy to adress deficits and promote increased independence. Therapy Prognosis: Fair  Requires PT Follow-Up: Yes  Activity Tolerance  Activity Tolerance: Patient tolerated treatment well;Patient limited by fatigue;Patient limited by endurance     Plan   Plan  Plan:  (3-5xwk)  Current Treatment Recommendations: Strengthening,ROM,Balance training,Gait training,Stair training,Functional mobility training,Transfer training,ADL/Self-care training,Endurance training,Home exercise program,Safety education & training,Patient/Caregiver education & training,Equipment evaluation, education, & procurement  Safety Devices  Type of Devices: Patient at risk for falls,All fall risk precautions in place,Bed alarm in place,Call light within reach,Gait belt,Nurse notified,Left in bed  Restraints  Restraints Initially in Place: No     Restrictions  Restrictions/Precautions  Restrictions/Precautions: Fall Risk,General Precautions  Required Braces or Orthoses?: No  Position Activity Restriction  Other position/activity restrictions: up with assistance; SBP <140     Subjective   General  Response To Previous Treatment: Patient with no complaints from previous session. Family / Caregiver Present: No  Subjective  Subjective: Pt and RN agreeable to physical therapy this date. . Pt supine upon arrival a,request assist to use rest room. Pleasant and cooperative throughout.      Cognition   Orientation  Overall Orientation Status: Within Functional Limits  Cognition  Safety Judgement: Decreased awareness of need for assistance;Decreased awareness of need for safety     Objective    Bed mobility  Supine to Sit: Stand by assistance  Sit to Supine: Stand by assistance  Scooting: Modified independent  Transfers  Sit to Stand: Contact guard assistance  Stand to sit: Contact guard assistance  Comment: transfers performed with RW,  Ambulation  Surface: level tile  Device: Rolling Walker  Assistance: Contact guard assistance  Quality of Gait: flexed posture  Gait Deviations: Slow Geovanna; Increased NERY; Decreased step length;Decreased step height  Distance: 20fft and 10ft x2  Comments: no LOb noted ; cueing for safety t/o; limited byfatigue . Stairs/Curb  Stairs?: No     Balance  Sitting - Static: Fair;+  Sitting - Dynamic: Fair;+  Standing - Static: Fair;+  Standing - Dynamic: Fair;+  Comments: standing balance with RW  Exercise Treatment: Seated LE exercise program: Long Arc Quads, hip abduction/adduction, heel/toe raises, and marches.  Reps:  x10      AM-PAC Score       AM-PAC Inpatient Mobility Raw Score : 20 (04/21/22 1516)  AM-PAC Inpatient T-Scale Score : 47.67 (04/21/22 1516)  Mobility Inpatient CMS 0-100% Score: 35.83 (04/21/22 1516)  Mobility Inpatient CMS G-Code Modifier : CJ (04/21/22 1516       Goals  Short Term Goals  Time Frame for Short term goals: 10 visits  Short term goal 1: Pt will perform bed mobility independently  Short term goal 2: Pt will perform functional transfers mod I  Short term goal 3: Pt will ambulate 300 ft, CGA, least restrictive AD  Short term goal 4: Pt will improve dynamic standing balance to GOOD to decrease risk of falls  Short term goal 5: Pt will tolerate 30 mins of physical therapy to promote inc activity tolerance  Additional Goals?: No       Therapy Time   Individual Concurrent Group Co-treatment   Time In 1350         Time Out 1413         Minutes 23         Timed Code Treatment Minutes: 23 Minutes       Jose A Agudelo, PTA

## 2022-04-23 NOTE — PROGRESS NOTES
Speech Language Pathology  Speech Language Pathology  [facility] [facility]    Cognitive Treatment Note    Date: 4/23/2022  Patients Name: Kp Tiwari  MRN: [id number]  Diagnosis:   Patient Active Problem List   Diagnosis Code    Essential hypertension I10    Hepatitis C B19.20    History of substance abuse ([address] X37.87    Alcoholic cirrhosis of liver without ascites ([address] K70.30    PUD (peptic ulcer disease) K27.9    Anemia, macrocytic D53.9    Duodenal ulcer with hemorrhage K26.4    Psychosis, intermittent requring antipsychotic meds F29    Opiate withdrawal ([address] F11.23    Pneumonia J18.9    Pancreas head lesion S36.200A    Polysubstance abuse ([address] F19.10    Right flank pain R10.9    Avascular necrosis of bone of right hip (HCC) M87.051    Closed fracture of head of right femur ([address] S72.051A    COPD without exacerbation ([facility] J44.9    Hypokalemia E87.6    History of hepatitis C Z86.19    Avascular necrosis (HCC) M87.00    History of total right hip replacement Z96.641    Protein calorie malnutrition (HCC) E46    Chronic diastolic heart failure (HCC) I50.32    Right sided weakness R53.1    Chronic daily headache R51.9    Memory changes R41.3    Generalized weakness R53.1    Stroke with cerebral ischemia (HCC) I63.9    SOB (shortness of breath) R06.02    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.052    Pulmonary embolism on right (HCC) I26.99    Multiple subsegmental pulmonary emboli without acute cor pulmonale (HCC) I26.94    Acute on chronic blood loss anemia D62    Severe comorbid illness R69    Primary osteoarthritis of left hip M16.12    Chronic anticoagulation Z79.01    Chronic idiopathic constipation K59.04    DDD (degenerative disc disease), lumbar M51.36    Medication management Z79.899    Pancreatic mass K86.89    Homeless Z59.00    Colitis K52.9    Epigastric pain R10.13    Right-sided chest wall pain R07.89    CRYSTAL (acute kidney injury) ([facility] N17.9    H/O malignant gastrointestinal stromal tumor (GIST) Z85.09    Gastrointestinal stromal tumor (GIST) ([facility] C49. A0    Hypoglycemia E16.2    History of pulmonary embolism Z86.711    Noncompliance Z91.19    Crack cocaine use F14.90    Moderate episode of recurrent major depressive disorder (HCC) F33.1    Atelectasis J98.11    Abdominal pain R10.9    Migraine G43.909    Migraine variants, not intractable G43.809    Pain of right hip joint M25.551    Arthritis M19.90    Hip arthritis M16.10    Migraine aura without headache G43. 109    Social problem Z65.9    Soft tissue tumor, malignant (HCC) C49.9    Unable to care for self Z78.9    Intraparenchymal hemorrhage of brain (HCC) I61.9    Basal ganglia hemorrhage (HCC) I61.0    History of blood clots: On Eliquis Z86.718       Pain: 0/10    Cognitive Treatment    Treatment time: 10:58-11:21am      Subjective: [x] Alert [x] Cooperative     [] Confused     [] Agitated    [] Lethargic    Pt was cooperative for tx. States she feels she is still \"fuzzy\" but feels her cognition is improving. ST will continue to follow. Objective/Assessment:    Recall: Recall of 3 units with 60% accuracy, increasing to 80% with prompt. Organization: Thought organization during conversation appeared disorganized. Discussed strategies to given simple details at one time to share story. 60% with prompting. Problem Solving/Reasoning: Deductive reasoning tasks: 80% independent, 100% with prompt. Categorization: 50% independent 6 items, task appeared very difficult and required multiple prompts to list at least 6 items. Opposites: 100% independent. Plan:  [x] Continue ST services    [] Discharge from ST:      Discharge recommendations: []  Further therapy recommended at discharge. The patient should be able to tolerate at least 3 hours of therapy per day over 5 days or 15 hours over 7 days. [x] Further therapy recommended at discharge.    [] No therapy recommended at discharge. Treatment completed by: Real Rodrigez M.A.  CCC-SLP

## 2022-04-24 VITALS
DIASTOLIC BLOOD PRESSURE: 79 MMHG | OXYGEN SATURATION: 95 % | SYSTOLIC BLOOD PRESSURE: 159 MMHG | HEART RATE: 76 BPM | RESPIRATION RATE: 21 BRPM | TEMPERATURE: 98.6 F

## 2022-04-24 LAB
ANION GAP SERPL CALCULATED.3IONS-SCNC: 10 MMOL/L (ref 9–17)
BUN BLDV-MCNC: 11 MG/DL (ref 8–23)
CALCIUM SERPL-MCNC: 8.8 MG/DL (ref 8.6–10.4)
CHLORIDE BLD-SCNC: 107 MMOL/L (ref 98–107)
CO2: 21 MMOL/L (ref 20–31)
CREAT SERPL-MCNC: 0.49 MG/DL (ref 0.5–0.9)
GFR AFRICAN AMERICAN: >60 ML/MIN
GFR NON-AFRICAN AMERICAN: >60 ML/MIN
GFR SERPL CREATININE-BSD FRML MDRD: ABNORMAL ML/MIN/{1.73_M2}
GLUCOSE BLD-MCNC: 116 MG/DL (ref 70–99)
HCT VFR BLD CALC: 39.6 % (ref 36.3–47.1)
HEMOGLOBIN: 12.5 G/DL (ref 11.9–15.1)
MCH RBC QN AUTO: 33.7 PG (ref 25.2–33.5)
MCHC RBC AUTO-ENTMCNC: 31.6 G/DL (ref 28.4–34.8)
MCV RBC AUTO: 106.7 FL (ref 82.6–102.9)
NRBC AUTOMATED: 0 PER 100 WBC
PDW BLD-RTO: 13.2 % (ref 11.8–14.4)
PLATELET # BLD: 313 K/UL (ref 138–453)
PMV BLD AUTO: 10.4 FL (ref 8.1–13.5)
POTASSIUM SERPL-SCNC: 5.4 MMOL/L (ref 3.7–5.3)
RBC # BLD: 3.71 M/UL (ref 3.95–5.11)
SODIUM BLD-SCNC: 138 MMOL/L (ref 135–144)
WBC # BLD: 5.3 K/UL (ref 3.5–11.3)

## 2022-04-24 PROCEDURE — 94761 N-INVAS EAR/PLS OXIMETRY MLT: CPT

## 2022-04-24 PROCEDURE — 6370000000 HC RX 637 (ALT 250 FOR IP): Performed by: INTERNAL MEDICINE

## 2022-04-24 PROCEDURE — 6370000000 HC RX 637 (ALT 250 FOR IP): Performed by: STUDENT IN AN ORGANIZED HEALTH CARE EDUCATION/TRAINING PROGRAM

## 2022-04-24 PROCEDURE — 2700000000 HC OXYGEN THERAPY PER DAY

## 2022-04-24 PROCEDURE — 85027 COMPLETE CBC AUTOMATED: CPT

## 2022-04-24 PROCEDURE — 80048 BASIC METABOLIC PNL TOTAL CA: CPT

## 2022-04-24 PROCEDURE — 6360000002 HC RX W HCPCS: Performed by: STUDENT IN AN ORGANIZED HEALTH CARE EDUCATION/TRAINING PROGRAM

## 2022-04-24 PROCEDURE — 36415 COLL VENOUS BLD VENIPUNCTURE: CPT

## 2022-04-24 PROCEDURE — 94640 AIRWAY INHALATION TREATMENT: CPT

## 2022-04-24 PROCEDURE — 99232 SBSQ HOSP IP/OBS MODERATE 35: CPT | Performed by: STUDENT IN AN ORGANIZED HEALTH CARE EDUCATION/TRAINING PROGRAM

## 2022-04-24 RX ORDER — GUAIFENESIN DEXTROMETHORPHAN HYDROBROMIDE ORAL SOLUTION 10; 100 MG/5ML; MG/5ML
10 SOLUTION ORAL EVERY 4 HOURS PRN
Qty: 1 EACH | Refills: 1 | Status: SHIPPED | OUTPATIENT
Start: 2022-04-24

## 2022-04-24 RX ORDER — CARVEDILOL 12.5 MG/1
12.5 TABLET ORAL 2 TIMES DAILY WITH MEALS
Qty: 60 TABLET | Refills: 0 | Status: SHIPPED | OUTPATIENT
Start: 2022-04-24 | End: 2022-05-24

## 2022-04-24 RX ORDER — GABAPENTIN 600 MG/1
300 TABLET ORAL 3 TIMES DAILY
Qty: 11 TABLET | Refills: 0 | Status: SHIPPED | OUTPATIENT
Start: 2022-04-24 | End: 2022-05-01

## 2022-04-24 RX ORDER — LOSARTAN POTASSIUM 50 MG/1
50 TABLET ORAL NIGHTLY
Qty: 30 TABLET | Refills: 0 | Status: SHIPPED | OUTPATIENT
Start: 2022-04-24 | End: 2022-05-24

## 2022-04-24 RX ORDER — PANTOPRAZOLE SODIUM 40 MG/1
40 TABLET, DELAYED RELEASE ORAL
Qty: 30 TABLET | Refills: 0 | Status: SHIPPED | OUTPATIENT
Start: 2022-04-24

## 2022-04-24 RX ORDER — MIRTAZAPINE 30 MG/1
30 TABLET, FILM COATED ORAL NIGHTLY
Qty: 7 TABLET | Refills: 0 | Status: SHIPPED | OUTPATIENT
Start: 2022-04-24 | End: 2022-05-01

## 2022-04-24 RX ORDER — LOSARTAN POTASSIUM 50 MG/1
50 TABLET ORAL NIGHTLY
Status: DISCONTINUED | OUTPATIENT
Start: 2022-04-24 | End: 2022-04-24 | Stop reason: HOSPADM

## 2022-04-24 RX ORDER — OXYCODONE HYDROCHLORIDE AND ACETAMINOPHEN 5; 325 MG/1; MG/1
1 TABLET ORAL EVERY 6 HOURS PRN
Qty: 15 TABLET | Refills: 0 | Status: ON HOLD | OUTPATIENT
Start: 2022-04-24 | End: 2022-05-03

## 2022-04-24 RX ADMIN — ENOXAPARIN SODIUM 30 MG: 100 INJECTION SUBCUTANEOUS at 07:59

## 2022-04-24 RX ADMIN — ALCOHOL 1 TABLET: 70.47 GEL TOPICAL at 07:56

## 2022-04-24 RX ADMIN — FAMOTIDINE 20 MG: 20 TABLET, FILM COATED ORAL at 07:57

## 2022-04-24 RX ADMIN — GUAIFENESIN DEXTROMETHORPHAN HYDROBROMIDE ORAL SOLUTION 10 ML: 200; 20 SOLUTION ORAL at 06:05

## 2022-04-24 RX ADMIN — OXYCODONE HYDROCHLORIDE AND ACETAMINOPHEN 2 TABLET: 5; 325 TABLET ORAL at 04:37

## 2022-04-24 RX ADMIN — CARVEDILOL 12.5 MG: 12.5 TABLET, FILM COATED ORAL at 07:56

## 2022-04-24 RX ADMIN — LOSARTAN POTASSIUM 25 MG: 25 TABLET, FILM COATED ORAL at 07:56

## 2022-04-24 RX ADMIN — BUDESONIDE AND FORMOTEROL FUMARATE DIHYDRATE 2 PUFF: 160; 4.5 AEROSOL RESPIRATORY (INHALATION) at 08:19

## 2022-04-24 RX ADMIN — OXYCODONE HYDROCHLORIDE AND ACETAMINOPHEN 2 TABLET: 5; 325 TABLET ORAL at 11:09

## 2022-04-24 RX ADMIN — GABAPENTIN 300 MG: 600 TABLET ORAL at 07:57

## 2022-04-24 ASSESSMENT — PAIN SCALES - GENERAL
PAINLEVEL_OUTOF10: 10
PAINLEVEL_OUTOF10: 9

## 2022-04-24 ASSESSMENT — PAIN DESCRIPTION - LOCATION: LOCATION: HEAD;EYE;ABDOMEN

## 2022-04-24 ASSESSMENT — PAIN - FUNCTIONAL ASSESSMENT: PAIN_FUNCTIONAL_ASSESSMENT: PREVENTS OR INTERFERES SOME ACTIVE ACTIVITIES AND ADLS

## 2022-04-24 NOTE — CARE COORDINATION
Transitional planning. Spoke to Shadi Cottrell, pt is a long term pt, bed hold. No pre-cert, HENS or COVID test needed. ENRIQUEKA:[phone]  FAX: [phone]  Call her once we have transportation time. [phone] to Shaina with [facility], confirmed an ambulance transport time of 4:30pm today. Informed Shaina with [address] [place] [address] of transport time    5723 informed pt's RN, Juan Francisco Duran and pt of transport time. Pt does not have anyone she wants CM to inform of transport.

## 2022-04-24 NOTE — PLAN OF CARE
Problem: Discharge Planning  Goal: Discharge to home or other facility with appropriate resources  4/24/2022 1318 by Sahara Fields RN  Outcome: Progressing  4/24/2022 0525 by Geeta Lezama RN  Outcome: Progressing  4/24/2022 0525 by Geeta Lezama RN  Outcome: Progressing     Problem: Safety - Adult  Goal: Free from fall injury  4/24/2022 1318 by Sahara Fields RN  Outcome: Progressing  4/24/2022 0525 by Geeta Lezama RN  Outcome: Progressing  4/24/2022 0525 by Geeta Lezama RN  Outcome: Progressing     Problem: Chronic Conditions and Co-morbidities  Goal: Patient's chronic conditions and co-morbidity symptoms are monitored and maintained or improved  4/24/2022 1318 by Sahara Fields RN  Outcome: Progressing  4/24/2022 0525 by Geeta Lezama RN  Outcome: Progressing  4/24/2022 0525 by Geeta Lezama RN  Outcome: Progressing     Problem: Pain  Goal: Verbalizes/displays adequate comfort level or baseline comfort level  4/24/2022 1318 by Sahara Fields RN  Outcome: Progressing  4/24/2022 0525 by Geeta Lezama RN  Outcome: Progressing  4/24/2022 0525 by Geeta Lezama RN  Outcome: Progressing     Problem: Skin/Tissue Integrity  Goal: Absence of new skin breakdown  Description: 1. Monitor for areas of redness and/or skin breakdown  2. Assess vascular access sites hourly  3. Every 4-6 hours minimum:  Change oxygen saturation probe site  4. Every 4-6 hours:  If on nasal continuous positive airway pressure, respiratory therapy assess nares and determine need for appliance change or resting period.   4/24/2022 1318 by Sahara Fields RN  Outcome: Progressing  4/24/2022 0525 by Geeta Lezama RN  Outcome: Progressing  4/24/2022 0525 by Geeta Lezama RN  Outcome: Progressing     Problem: ABCDS Injury Assessment  Goal: Absence of physical injury  4/24/2022 1318 by Sahara Fields RN  Outcome: Progressing  4/24/2022 0525 by Geeta Lezama RN  Outcome: Progressing  4/24/2022 0525 by Geeta Lezama RN  Outcome: Progressing     Problem: Discharge Planning  Goal: Discharge to home or other facility with appropriate resources  4/24/2022 1318 by Teresa Christianson RN  Outcome: Progressing  4/24/2022 0525 by Sue Peter RN  Outcome: Progressing  4/24/2022 0525 by Sue Peter RN  Outcome: Progressing     Problem: Safety - Adult  Goal: Free from fall injury  4/24/2022 1318 by Teresa Christianson RN  Outcome: Progressing  4/24/2022 0525 by Sue Peter RN  Outcome: Progressing  4/24/2022 0525 by Sue Peter RN  Outcome: Progressing     Problem: Chronic Conditions and Co-morbidities  Goal: Patient's chronic conditions and co-morbidity symptoms are monitored and maintained or improved  4/24/2022 1318 by Teresa Christianson RN  Outcome: Progressing  4/24/2022 0525 by Sue Peter RN  Outcome: Progressing  4/24/2022 0525 by Sue Peter RN  Outcome: Progressing

## 2022-04-24 NOTE — DISCHARGE SUMMARY
No resolved hospital problems. *      Admission Condition:  stable     Discharged Condition: stable    Hospital Stay:     Hospital Course:  Emi Painting is a 69 y.o. female who was admitted for the management of   Intraparenchymal hemorrhage of brain ([address] , presented to ER with Cerebrovascular Accident (Unknown last time well, arrived [employer] with LUKE)    68-year-old female past medical history of hypertension, stage Ia T2 pancreatic stromal tumor force and deemed poor surgical candidate, started on imatinib, history of PE, Hepatitis C, chronic pain presents with nausea vomiting headache found to have acute left intracerebral hemorrhage left basal ganglia hemorrhage. Patient remained in the ICU and blood pressure medications were adjusted. Per neuro critical care anticoagulation to be held, okay for DVT prophylaxis. Plan for repeat CT head in approximately 3 weeks to reevaluate for hemorrhage before restarting Eliquis. Patient also started on protonix for GERD      Significant therapeutic interventions: see above    Significant Diagnostic Studies:   Labs / Micro:  CBC:   Lab Results   Component Value Date    WBC 5.3 04/24/2022    RBC 3.71 04/24/2022    RBC 4.15 05/04/2012    HGB 12.5 04/24/2022    HCT 39.6 04/24/2022    .7 04/24/2022    MCH 33.7 04/24/2022    MCHC 31.6 04/24/2022    RDW 13.2 04/24/2022     04/24/2022     05/04/2012     BMP:    Lab Results   Component Value Date    GLUCOSE 116 04/24/2022    GLUCOSE 76 05/04/2012     04/24/2022    K 5.4 04/24/2022     04/24/2022    CO2 21 04/24/2022    ANIONGAP 10 04/24/2022    BUN 11 04/24/2022    CREATININE 0.49 04/24/2022    BUNCRER 23 04/21/2022    CALCIUM 8.8 04/24/2022    LABGLOM >60 04/24/2022    GFRAA >60 04/24/2022    GFR      04/24/2022        Radiology:  CT head without contrast    Result Date: 4/21/2022  1. Stable 6 mm intraparenchymal hemorrhage involving the posterior left putamen.  This may be related to underlying hypertensive hemorrhage. 2. Stable cerebral parenchymal volume loss with chronic microvascular white matter ischemic disease. 3. No new areas of hemorrhage. CT Head WO Contrast    Result Date: 4/21/2022  5 mm left basal ganglial hemorrhage. Otherwise chronic microvascular disease and involutional changes. Critical results were called by Dr. Sharma Kanner to [facility] on 4/21/2022 at 01:06. XR CHEST PORTABLE    Result Date: 4/22/2022  Stable cardiomegaly. Mild central vascular congestion without overt edema or effusion. XR CHEST PORTABLE    Result Date: 4/21/2022  1. Cardiomegaly with increasing pulmonary vascular congestion and atelectasis in the left lung base. CTA head neck with contrast    Result Date: 4/21/2022  Unremarkable CTA of the head and neck. MRI BRAIN WO CONTRAST    Result Date: 4/22/2022  1. Stable tiny focus of intraparenchymal hemorrhage involving the posterior left putamen. 2. Mild-to-moderate chronic white matter microvascular ischemic changes with multiple chronic lacunar infarcts in the bilateral basal ganglia, left thalamus, and right leandro. 3. Punctate foci of remote microhemorrhage in the bilateral basal ganglia, right thalamus, and leandro. These findings are most consistent with sequela of chronic hypertensive microangiopathy. Consultations:    Consults:     Final Specialist Recommendations/Findings:   IP CONSULT TO NEUROCRITICAL CARE  IP CONSULT TO NEUROSURGERY  IP CONSULT TO INTERNAL MEDICINE  IP CONSULT TO IV TEAM      The patient was seen and examined on day of discharge and this discharge summary is in conjunction with any daily progress note from day of discharge. Discharge plan:     Disposition: To a non-[facility] facility    Physician Follow Up:     Nile Kayser, JAYDON - CNP  [address] [place] [address] [address]  [phone]    In 7 days  followup for head bleed.      Armaniena Mask  [address]  [place] [address] Catalina Montenegro Horace Ozuna MD  [address] [place] [address] [phone]          [facility] [address]  [address] [facility]  [phone]           Requiring Further Evaluation/Follow Up POST HOSPITALIZATION/Incidental Findings: follow up PCP, follow up neurology, follow up oncology, hold eliquis till re-evaluated as outapteint in about 3 weeks (5/12/22)    Diet: regular diet    Activity: As tolerated    Instructions to Patient: follow up PCP, follow up neurology, follow up oncology, hold eliquis till re-evaluated as outapteint in about 3 weeks (5/12/22)      Discharge Medications:      Medication List      START taking these medications    dextromethorphan-guaiFENesin  MG/5ML syrup  Commonly known as: ROBITUSSIN-DM  Take 10 mLs by mouth every 4 hours as needed for Cough     gabapentin 600 MG tablet  Commonly known as: NEURONTIN  Take 0.5 tablets by mouth 3 times daily for 7 days. Replaces: gabapentin 300 MG capsule     losartan 50 MG tablet  Commonly known as: COZAAR  Take 1 tablet by mouth at bedtime     oxyCODONE-acetaminophen 5-325 MG per tablet  Commonly known as: PERCOCET  Take 1 tablet by mouth every 6 hours as needed for Pain for up to 7 days. CHANGE how you take these medications    amitriptyline 10 MG tablet  Commonly known as: ELAVIL  Take 1 tablet by mouth nightly  What changed: Another medication with the same name was removed. Continue taking this medication, and follow the directions you see here. atorvastatin 40 MG tablet  Commonly known as: LIPITOR  Take 1 tablet by mouth nightly  What changed: Another medication with the same name was removed. Continue taking this medication, and follow the directions you see here.      carvedilol 12.5 MG tablet  Commonly known as: COREG  Take 1 tablet by mouth 2 times daily (with meals)  What changed:   · medication strength  · how much to take     mirtazapine 30 MG tablet  Commonly known as: REMERON  Take 1 tablet by mouth nightly for 7 days  What changed:   · how much to take  · how to take this  · when to take this        CONTINUE taking these medications    acetaminophen 500 MG tablet  Commonly known as: TYLENOL  Take 2 tablets by mouth every 6 hours as needed for Pain     albuterol sulfate  (90 Base) MCG/ACT inhaler  Commonly known as: Proventil HFA  Inhale 1-2 puffs into the lungs once for 1 dose     butalbital-acetaminophen-caffeine -40 MG per tablet  Commonly known as: FIORICET, ESGIC  Take 1 tablet by mouth every 12 hours for 14 days     cyanocobalamin 250 MCG tablet  Take 1 tablet by mouth daily     famotidine 20 MG tablet  Commonly known as: PEPCID     ferrous sulfate 325 (65 Fe) MG EC tablet  Commonly known as: FE TABS 325  Take 1 tablet by mouth daily     ipratropium-albuterol 0.5-2.5 (3) MG/3ML Soln nebulizer solution  Commonly known as: DUONEB  Inhale 3 mLs into the lungs every 4 hours as needed for Shortness of Breath     mometasone-formoterol 100-5 MCG/ACT inhaler  Commonly known as: DULERA  Inhale 2 puffs into the lungs 2 times daily     ondansetron 8 MG Tbdp disintegrating tablet  Commonly known as: ZOFRAN-ODT     therapeutic multivitamin-minerals tablet  Take 1 tablet by mouth daily     thiamine 100 MG tablet  Take 1 tablet by mouth daily     Wheelchair Misc  1 Device by Does not apply route once for 1 dose        STOP taking these medications    apixaban 5 MG Tabs tablet  Commonly known as: ELIQUIS     clopidogrel 75 MG tablet  Commonly known as: PLAVIX     gabapentin 300 MG capsule  Commonly known as: NEURONTIN  Replaced by: gabapentin 600 MG tablet     Icy Hot Lidocaine Plus Menthol 4-1 % Ptch  Generic drug: Lidocaine-Menthol     imatinib 400 MG chemo tablet  Commonly known as: GLEEVEC     lidocaine 4 % external patch     lisinopril 10 MG tablet  Commonly known as: PRINIVIL;ZESTRIL           Where to Get Your Medications      These medications were sent to [facility], [address] [facility]  [address], [address] [place] [address]    Phone: [phone]   · carvedilol 12.5 MG tablet  · dextromethorphan-guaiFENesin  MG/5ML syrup  · losartan 50 MG tablet     You can get these medications from any pharmacy    Bring a paper prescription for each of these medications  · gabapentin 600 MG tablet  · mirtazapine 30 MG tablet  · oxyCODONE-acetaminophen 5-325 MG per tablet         Discharge Procedure Orders   CT HEAD WO CONTRAST   Standing Status: Future Standing Exp. Date: 04/21/23     Order Specific Question Answer Comments   Reason for exam: followup scan        Time Spent on discharge is  33 mins in patient examination, evaluation, counseling as well as medication reconciliation, prescriptions for required medications, discharge plan and follow up. Electronically signed by   Rogelio Christiansen MD  4/24/2022  8:41 AM      Thank you Dr. Darnella Aase, MD for the opportunity to be involved in this patient's care.

## 2022-04-24 NOTE — PLAN OF CARE
Problem: Discharge Planning  Goal: Discharge to home or other facility with appropriate resources  4/24/2022 0525 by Alba Torres RN  Outcome: Progressing  4/24/2022 0525 by Alba Torres RN  Outcome: Progressing     Problem: Safety - Adult  Goal: Free from fall injury  4/24/2022 0525 by Alba Torres RN  Outcome: Progressing  4/24/2022 0525 by Alba Torres RN  Outcome: Progressing     Problem: Chronic Conditions and Co-morbidities  Goal: Patient's chronic conditions and co-morbidity symptoms are monitored and maintained or improved  4/24/2022 0525 by Alba Torres RN  Outcome: Progressing  4/24/2022 0525 by Alba Torres RN  Outcome: Progressing     Problem: Pain  Goal: Verbalizes/displays adequate comfort level or baseline comfort level  4/24/2022 0525 by Alba Torres RN  Outcome: Progressing  4/24/2022 0525 by Alba Torres RN  Outcome: Progressing     Problem: Skin/Tissue Integrity  Goal: Absence of new skin breakdown  Description: 1. Monitor for areas of redness and/or skin breakdown  2. Assess vascular access sites hourly  3. Every 4-6 hours minimum:  Change oxygen saturation probe site  4. Every 4-6 hours:  If on nasal continuous positive airway pressure, respiratory therapy assess nares and determine need for appliance change or resting period.   4/24/2022 0525 by Alba Torres RN  Outcome: Progressing  4/24/2022 0525 by Alba Torres RN  Outcome: Progressing     Problem: ABCDS Injury Assessment  Goal: Absence of physical injury  4/24/2022 0525 by Alba Torres RN  Outcome: Progressing  4/24/2022 0525 by Alba Torres RN  Outcome: Progressing

## 2022-04-24 NOTE — PROGRESS NOTES
[facility]  Office: [address], DO, Alda Simple, DO, Leroy Hedge, DO, Danielle Petit Blood, DO, Teo Maldonado MD, Rin Agarwal MD, Tevin Shaikh MD, Yi Conley MD, Kamala Wang MD, Nadira West MD, Sara Senior MD, Lorenza Ames, DO, Alia Martinez, DO, Daron Chisholm MD,  Marielle Sanchez, DO, Tova Figueroa MD, Beny Lorenzo MD, Vidal Vee MD, Carroll Braun, DO, Rigo Yang MD, Nasir Cavanaugh MD, Denise Shha, [facility], [facility], [facility], Jesse Mabry CNP, Angela Clinton, CNP, Elvis Hernández, CNP, Chelsey Alvarado, CNP, EVELINA AwadC, Lashawn Serarto, [facility], John Good, CNP, Angelique Benitez CNP, Dragan Wang, CNP, Tanvir Mckenna, CNS, Rosy Ramirez, [facility], Leopoldo Real, CNP, Sammie Jeter, CNP, Quinn Malloy, [address] [place] [address]    Progress Note    4/24/2022    8:34 AM    Name:   Tory Coronel  MRN:     [id number]     Acct:      [de-identified]   Room:   [facility] Day:  3  Admit Date:  4/21/2022  2:39 AM    PCP:   Almas Harris MD  Code Status:  Full Code    Subjective:     C/C:   Chief Complaint   Patient presents with    Cerebrovascular Accident     Unknown last time well, arrived [facility] with [facility]     Interval History Status: improved. Patient seen and examined. Some congestion, no headache today, intermittent abdominal pain. Brief History:     68-year-old female past medical history of hypertension, stage Ia T2 pancreatic stromal tumor force and deemed poor surgical candidate, started on imatinib, history of PE, Hepatitis C, chronic pain presents with nausea vomiting headache found to have acute left intracerebral hemorrhage left basal ganglia hemorrhage. Patient remained in the ICU and blood pressure medications were adjusted. Per neuro critical care anticoagulation to be held, okay for DVT prophylaxis.   Plan for repeat CT head in approximately 3 weeks to reevaluate for hemorrhage before restarting Eliquis. Review of Systems:     Constitutional:  negative for chills, fevers, sweats  Respiratory:  negative for cough, dyspnea on exertion, shortness of breath, wheezing  Cardiovascular:  negative for chest pain, chest pressure/discomfort, lower extremity edema, palpitations  Gastrointestinal:  negative for abdominal pain, constipation, diarrhea, nausea, vomiting  Neurological:  negative for dizziness, headache    Medications: Allergies:     Allergies   Allergen Reactions    Aspirin Itching    Keflet [Cephalexin]      Tolerated penicillin V 12/13, tolerates cephalexin 6/14       Current Meds:   Scheduled Meds:    multivitamin  1 tablet Oral Daily    losartan  25 mg Oral Daily    carvedilol  12.5 mg Oral BID WC    gabapentin  300 mg Oral TID    enoxaparin  30 mg SubCUTAneous Daily    morphine  4 mg IntraVENous Once    mirtazapine  30 mg Oral Nightly    budesonide-formoterol  2 puff Inhalation BID    amitriptyline  10 mg Oral Nightly    atorvastatin  40 mg Oral Nightly    famotidine  20 mg Oral Daily     Continuous Infusions:   PRN Meds: dextromethorphan-guaiFENesin, hydrALAZINE, oxyCODONE-acetaminophen, butalbital-acetaminophen-caffeine, ondansetron **OR** ondansetron, albuterol sulfate HFA, ipratropium-albuterol, labetalol    Data:     Past Medical History:   has a past medical history of Appendicitis, Cerebral artery occlusion with cerebral infarction ([address] CHF (congestive heart failure) ([address] Chronic respiratory failure with hypoxia and hypercapnia (HCC), Chronic rhinitis, Cigarette smoker, COPD (chronic obstructive pulmonary disease) ([address] Depression, Dysphagia, Essential hypertension, GERD (gastroesophageal reflux disease), Heart failure, diastolic, with acute decompensation ([address] Hepatitis C, chronic ([address] History of smoking at least 1 pack per day for at least 30 years, Hyperlipidemia, Hypertension, Migraine, Moderate COPD (chronic obstructive pulmonary disease) ([facility] Multiple transfusions, Neurogenic dysphagia, Osteoarthritis, Pneumonia, Scoliosis, and Substance abuse ([facility] Social History:   reports that she has been smoking cigarettes. She started smoking about 52 years ago. She has a 20.00 pack-year smoking history. She has never used smokeless tobacco. She reports that she does not drink alcohol and does not use drugs. Family History:   Family History   Problem Relation Age of Onset    Breast Cancer Mother     Heart Disease Father        Vitals:  BP (!) 171/95   Pulse 79   Temp 98.1 °F (36.7 °C) (Oral)   Resp 23   LMP 2015   SpO2 97%   Temp (24hrs), Av.5 °F (36.9 °C), Min:98.1 °F (36.7 °C), Max:98.7 °F (37.1 °C)    No results for input(s): POCGLU in the last 72 hours. I/O (24Hr):   No intake or output data in the 24 hours ending 22 0834    Labs:  Hematology:  Recent Labs     22  03322  0522  0503   WBC 8.9 6.1 5.3   RBC 3.50* 3.75* 3.71*   HGB 11.7* 13.0 12.5   HCT 38.4 40.5 39.6   .7* 108.0* 106.7*   MCH 33.4 34.7* 33.7*   MCHC 30.5 32.1 31.6   RDW 13.7 13.5 13.2    387 313   MPV 10.1 9.5 10.4     Chemistry:  Recent Labs     22  0335 22  0525 22  0503    139 138   K 4.3 4.8 5.4*   * 107 107   CO2 22 20 21   GLUCOSE 87 97 116*   BUN 14 13 11   CREATININE 0.49* 0.47* 0.49*   ANIONGAP 9 12 10   LABGLOM >60 >60 >60   GFRAA >60 >60 >60   CALCIUM 8.8 9.1 8.8   PROBNP  --  370*  --      Recent Labs     22  1035   LABA1C 4.5     ABG:  Lab Results   Component Value Date    POCPH 7.366 2019    POCPCO2 51.0 2019    POCPO2 65.9 2019    POCHCO3 29.2 2019    NBEA NOT REPORTED 2019    PBEA 3 2019    JIF9NNQ 31 2019    ZGPS3ZYA 92 2019    FIO2 NOT REPORTED 2019     Lab Results   Component Value Date/Time    SPECIAL NOT REPORTED 2021 12:09 AM     Lab Results   Component Value Date/Time    CULTURE NO SIGNIFICANT Problems           Last Modified POA    * (Principal) Intraparenchymal hemorrhage of brain ([address] 4/21/2022 Yes    Basal ganglia hemorrhage ([address] 4/21/2022 Yes    History of blood clots: On Eliquis 4/22/2022 Yes    Essential hypertension 4/22/2022 Yes    History of substance abuse ([address] 6/77/3838 Yes    Alcoholic cirrhosis of liver without ascites ([address] 4/22/2022 Yes    Anemia, macrocytic 4/22/2022 Yes    Avascular necrosis of bone of right hip ([address] 4/22/2022 Yes    COPD without exacerbation ([address] 4/22/2022 Yes    History of hepatitis C (Chronic) 4/22/2022 Yes    Overview Signed 8/11/2019  2:24 PM by Marija Mallory DO     Previously treated at [facility]         Chronic diastolic heart failure ([address] 4/22/2022 Yes    Generalized weakness 4/23/2022 Yes    Avascular necrosis of left femur ([address] 4/22/2022 Yes    Chronic anticoagulation 4/22/2022 Yes    Pancreatic mass 4/22/2022 Yes    Gastrointestinal stromal tumor (GIST) ([address] 4/22/2022 Yes    History of pulmonary embolism 4/22/2022 Yes                      Plan:        1. Intraparenchymal hemorrhage of the basal ganglia. Appreciate neuro critical care recommendations. Hold on Eliquis. Aspirin and Plavix also held  2. Hypertension. Coreg adjusted, added Cozaar 25 mg  3. Chronic headache. Continue Elavil  4. COPD. Continue inhalers as needed  5. History of GIST tumor. Was evaluated by surgery unfortunate poor candidate. Has follow-up with oncology at [facility]  6. History of PE. Eliquis on hold  7. Depression, remeron  8. Allergic rhinitis. Flonase, zyrtec, robitussin for cough  9. Chronic back pain, continue percocet  10. History of hepatitis C. Follow-up with gastroenterology as outpatient  11.  PT/OT  12. paln to discharge back to [facility]    Honey Alvarez MD  4/24/2022  8:34 AM

## 2022-04-24 NOTE — DISCHARGE INSTR - COC
Continuity of Care Form    Patient Name: Lb Dailey   :  1955  MRN:  [id number]    Admit date:  2022  Discharge date: 2022    Code Status Order: Full Code   Advance Directives:      Admitting Physician:  No admitting provider for patient encounter.   PCP: Sury Marquis MD    Discharging Nurse: Román Simmons Unit/Room#: 9608/5098-22  Discharging Unit Phone Number: [phone]    Emergency Contact:   Extended Emergency Contact Information  Primary Emergency Contact: Kevin Lopes  Home Phone: [phone]  Mobile Phone: [phone]  Relation: Child    Past Surgical History:  Past Surgical History:   Procedure Laterality Date    APPENDECTOMY  2017     SECTION      GASTRIC BYPASS SURGERY      HIP SURGERY Right 2019    HIP TOTAL ARTHROPLASTY ANTERIOR APPROACH - [place],  C-ARM, (Right )    LAPAROSCOPIC APPENDECTOMY N/A 3/27/2017    APPENDECTOMY LAPAROSCOPIC performed by Mallorie Wiley MD at [address] [place] [address]      hgsil    MEDICATION INJECTION Left 3/25/2021    STEROID INJECTION LEFT HIP  WITH FLUORO performed by Khurram Diane MD at [address] [place] [address] Left 2021    INJECTION STEROID LEFT HIP WITH FLUORO- ATTEMPTED performed by Khurram Diane MD at [address] [place] [address] 2021    INJECTION STEROID LEFT HIP performed by Khurram Diane MD at [address] [place] [address] Left 2021    hip injection    MA COLONOSCOPY W/BIOPSY SINGLE/MULTIPLE N/A 11/10/2018    COLONOSCOPY WITH BIOPSY performed by Shelia Story MD at [facility]    MA EGD TRANSORAL BIOPSY SINGLE/MULTIPLE N/A 2018    EGD ESOPHAGOGASTRODUODENOSCOPY performed by Maya Bernal MD at [address] [facility] Right 2019    HIP TOTAL ARTHROPLASTY ANTERIOR APPROACH - [place]  C-ARM, performed by Maykel Hewitt DO at [facility]      oesophagitis, candidiasis    UPPER GASTROINTESTINAL ENDOSCOPY  11/05/2018    UPPER GASTROINTESTINAL ENDOSCOPY N/A 11/5/2018    EGD CONTROL HEMORRHAGE performed by Liam Toussaint MD at [address] [place] [address] Rd 4/5/2021    EGD W/EUS FNA performed by Gisella Aguirre MD at [facility]       Immunization History:   Immunization History   Administered Date(s) Administered    COVID-19, Moderna, Booster, PF, 50mcg/0.25ml 03/08/2022    COVID-19, Hans Plater, Primary or Immunocompromised, PF, 100mcg/0.5mL 07/13/2021, 08/10/2021       Active Problems:  Patient Active Problem List   Diagnosis Code    Essential hypertension I10    Hepatitis C B19.20    History of substance abuse ([address] R79.01    Alcoholic cirrhosis of liver without ascites ([address] K70.30    PUD (peptic ulcer disease) K27.9    Anemia, macrocytic D53.9    Duodenal ulcer with hemorrhage K26.4    Psychosis, intermittent requring antipsychotic meds F29    Opiate withdrawal ([address] F11.23    Pneumonia J18.9    Pancreas head lesion S36.200A    Polysubstance abuse ([address] F19.10    Right flank pain R10.9    Avascular necrosis of bone of right hip ([address] M87.051    Closed fracture of head of right femur ([address] S72.051A    COPD without exacerbation ([address] J44.9    Hypokalemia E87.6    History of hepatitis C Z86.19    Avascular necrosis (HCC) M87.00    History of total right hip replacement Z96.641    Protein calorie malnutrition (HCC) E46    Chronic diastolic heart failure (HCC) I50.32    Right sided weakness R53.1    Chronic daily headache R51.9    Memory changes R41.3    Generalized weakness R53.1    Stroke with cerebral ischemia (HCC) I63.9    SOB (shortness of breath) R06.02    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.052    Pulmonary embolism on right (HCC) I26.99    Multiple subsegmental pulmonary emboli without acute cor pulmonale (HCC) I26.94    Acute on chronic blood loss anemia D62    Severe comorbid illness R69    Primary osteoarthritis of left hip M16.12    Chronic anticoagulation Z79.01    Chronic idiopathic constipation K59.04    DDD (degenerative disc disease), lumbar M51.36    Medication management Z79.899    Pancreatic mass K86.89    Homeless Z59.00    Colitis K52.9    Epigastric pain R10.13    Right-sided chest wall pain R07.89    CRYSTAL (acute kidney injury) ([facility] N17.9    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 A0    Hypoglycemia E16.2    History of pulmonary embolism Z86.711    Noncompliance Z91.19    Crack cocaine use F14.90    Moderate episode of recurrent major depressive disorder (HCC) F33.1    Atelectasis J98.11    Abdominal pain R10.9    Migraine G43.909    Migraine variants, not intractable G43.809    Pain of right hip joint M25.551    Arthritis M19.90    Hip arthritis M16.10    Migraine aura without headache G43.109    Social problem Z65.9    Soft tissue tumor, malignant (HCC) C49.9    Unable to care for self Z78.9    Intraparenchymal hemorrhage of brain (HCC) I61.9    Basal ganglia hemorrhage (HCC) I61.0    History of blood clots:  On Eliquis Z86.718       Isolation/Infection:   Isolation            No Isolation          Patient Infection Status       Infection Onset Added Last Indicated Last Indicated By Review Planned Expiration Resolved Resolved By    None active    Resolved    COVID-19 (Rule Out) 03/04/22 03/04/22 03/04/22 COVID-19, Rapid (Ordered)   03/04/22 Rule-Out Test Resulted    COVID-19 (Rule Out) 06/05/21 06/05/21 06/05/21 COVID-19, Rapid (Ordered)   06/05/21 Rule-Out Test Resulted    COVID-19 (Rule Out) 05/05/21 05/05/21 05/05/21 COVID-19, Rapid (Ordered)   05/05/21 Rule-Out Test Resulted    COVID-19 (Rule Out) 03/29/21 03/29/21 03/29/21 COVID-19, Rapid (Ordered)   03/29/21 Rule-Out Test Resulted    COVID-19 (Rule Out) 07/19/20 07/19/20 07/19/20 COVID-19 (Ordered)   07/19/20 Rule-Out Test Resulted    COVID-19 (Rule Out) 04/24/20 04/24/20 04/24/20 COVID-19 (Ordered)   04/25/20 Rule-Out Test Resulted            Nurse Assessment:  Last Vital Signs: BP (!) 171/95   Pulse 79   Temp 98.1 °F (36.7 °C) (Oral)   Resp 23   LMP 03/18/2015   SpO2 97%     Last documented pain score (0-10 scale): Pain Level: 9  Last Weight:   Wt Readings from Last 1 Encounters:   04/20/22 100 lb (45.4 kg)     Mental Status:  oriented, alert, and thought processes intact    IV Access:  - None    Nursing Mobility/ADLs:  Walking   Assisted  Transfer  Assisted  Bathing  Assisted  Dressing  Independent  Toileting  Dependent  Feeding  Dependent  Med Admin  Dependent  Med Delivery   none    Wound Care Documentation and Therapy:  Puncture Hip Anterior; Left (Active)   Number of days:         Elimination:  Continence: Bowel: Yes  Bladder: Yes  Urinary Catheter: None   Colostomy/Ileostomy/Ileal Conduit: No       Date of Last BM: 4-23-22  No intake or output data in the 24 hours ending 04/24/22 0840  No intake/output data recorded. Safety Concerns: At Risk for Falls    Impairments/Disabilities:      None    Nutrition Therapy:  Current Nutrition Therapy:   - Oral Diet:  General    Routes of Feeding: Oral  Liquids: Thin Liquids  Daily Fluid Restriction: no  Last Modified Barium Swallow with Video (Video Swallowing Test): 4-21-22    Treatments at the Time of Hospital Discharge:   Respiratory Treatments: N/A  Oxygen Therapy:  is not on home oxygen therapy.   Ventilator:    - No ventilator support    Rehab Therapies: Physical Therapy and Occupational Therapy  Weight Bearing Status/Restrictions: No weight bearing restrictions  Other Medical Equipment (for information only, NOT a DME order):  N/A  Other Treatments: N/A    Patient's personal belongings (please select all that are sent with patient):  None    RN SIGNATURE:  Electronically signed by Juan Antonio Terry RN on 4/24/22 at 3:14 PM EDT    CASE MANAGEMENT/SOCIAL WORK SECTION    Inpatient Status Date: ***    Readmission Risk Assessment Score:  Readmission Risk              Risk of Unplanned Readmission:  37 Discharging to Facility/ Agency   Name: [address] [place] [address]  Address: Gustavo Taylor, [place], [address] [place] [address]  Phone: [phone]  Fax: [phone]      Dialysis Facility (if applicable)   Name:  Address:  Dialysis Schedule:  Phone:  Fax:    / signature: Electronically signed by Mojgan Graf RN on 4/24/22 at 1:49 PM EDT    PHYSICIAN SECTION    Prognosis: Guarded    Condition at Discharge: Stable    Rehab Potential (if transferring to Rehab): Guarded    Recommended Labs or Other Treatments After Discharge: PT, OT, nursing evaluation and treatment, wean down oxygen as tolerated. Off anticoagulation due to intracranial hemorrhage, Follow up CT as outapteint. Follow up oncology, follow up PCP follow up neurology    Physician Certification: I certify the above information and transfer of Emi Daniels  is necessary for the continuing treatment of the diagnosis listed and that she requires [place] for less 30 days.      Update Admission H&P: No change in H&P    PHYSICIAN SIGNATURE:  Electronically signed by Jaclyn Oneal MD on 4/24/22 at 8:41 AM EDT

## 2022-04-25 ENCOUNTER — TELEPHONE (OUTPATIENT)
Dept: FAMILY MEDICINE CLINIC | Age: 67
End: 2022-04-25

## 2022-04-25 NOTE — TELEPHONE ENCOUNTER
[facility] Transitions Initial Follow Up Call    Outreach made within 2 business days of discharge: Yes    Patient: Lazaro Daniels   Patient : 1955   MRN: [id number]    Reason for Admission: hemmorrage of brain  Discharge Date: 2022       Spoke with: coordination notes    Discharge department/facility: [address] Stepdown    Scheduled appointment with PCP within 7-14 days  Patient discharged to SNF    Follow Up  Future Appointments   Date Time Provider Stephon Weiner   2022 11:00 AM [address] [place] [address], APRN - CNP [facility] John Kamara, [place]

## 2022-04-29 ENCOUNTER — HOSPITAL ENCOUNTER (EMERGENCY)
Age: 67
Discharge: ANOTHER HOSPITAL | DRG: 139 | End: 2022-04-29
Payer: MEDICARE

## 2022-04-29 ENCOUNTER — HOSPITAL ENCOUNTER (OUTPATIENT)
Age: 67
Discharge: HOME | End: 2022-04-29

## 2022-04-29 ENCOUNTER — APPOINTMENT (OUTPATIENT)
Dept: CT IMAGING | Age: 67
DRG: 139 | End: 2022-04-29
Payer: MEDICARE

## 2022-04-29 ENCOUNTER — APPOINTMENT (OUTPATIENT)
Dept: GENERAL RADIOLOGY | Age: 67
End: 2022-04-29
Payer: MEDICARE

## 2022-04-29 ENCOUNTER — HOSPITAL ENCOUNTER (EMERGENCY)
Age: 67
Discharge: LEFT BEFORE DISCHARGE | End: 2022-04-29
Attending: EMERGENCY MEDICINE
Payer: MEDICARE

## 2022-04-29 ENCOUNTER — APPOINTMENT (OUTPATIENT)
Dept: MRI IMAGING | Age: 67
End: 2022-04-29
Payer: MEDICARE

## 2022-04-29 VITALS
HEART RATE: 88 BPM | WEIGHT: 100 LBS | TEMPERATURE: 97.7 F | RESPIRATION RATE: 16 BRPM | DIASTOLIC BLOOD PRESSURE: 78 MMHG | HEIGHT: 59 IN | SYSTOLIC BLOOD PRESSURE: 136 MMHG | BODY MASS INDEX: 20.16 KG/M2 | OXYGEN SATURATION: 88 %

## 2022-04-29 DIAGNOSIS — G45.9 TIA (TRANSIENT ISCHEMIC ATTACK): Primary | ICD-10-CM

## 2022-04-29 LAB
ABSOLUTE EOS #: 0.14 K/UL (ref 0–0.4)
ABSOLUTE IMMATURE GRANULOCYTE: 0 K/UL (ref 0–0.3)
ABSOLUTE LYMPH #: 2.03 K/UL (ref 1–4.8)
ABSOLUTE MONO #: 0.56 K/UL (ref 0.2–0.8)
ANION GAP SERPL CALCULATED.3IONS-SCNC: 14 MMOL/L (ref 9–17)
BASOPHILS # BLD: 1 %
BASOPHILS ABSOLUTE: 0.07 K/UL (ref 0–0.2)
BUN BLDV-MCNC: 22 MG/DL (ref 8–23)
BUN/CREAT BLD: 24 (ref 9–20)
CALCIUM SERPL-MCNC: 10.3 MG/DL (ref 8.6–10.4)
CHLORIDE BLD-SCNC: 95 MMOL/L (ref 98–107)
CO2: 25 MMOL/L (ref 20–31)
CREAT SERPL-MCNC: 0.92 MG/DL (ref 0.5–0.9)
EOSINOPHILS RELATIVE PERCENT: 2 % (ref 1–4)
GFR AFRICAN AMERICAN: >60 ML/MIN
GFR NON-AFRICAN AMERICAN: >60 ML/MIN
GFR SERPL CREATININE-BSD FRML MDRD: ABNORMAL ML/MIN/{1.73_M2}
GLUCOSE BLD-MCNC: 91 MG/DL (ref 70–99)
GLUCOSE BLD-MCNC: 97 MG/DL (ref 65–105)
HCT VFR BLD CALC: 48.6 % (ref 36.3–47.1)
HEMOGLOBIN: 14.5 G/DL (ref 11.9–15.1)
IMMATURE GRANULOCYTES: 0 %
LACTIC ACID: 0.9 MMOL/L (ref 0.5–2.2)
LYMPHOCYTES # BLD: 29 % (ref 24–44)
MCH RBC QN AUTO: 33.1 PG (ref 25.2–33.5)
MCHC RBC AUTO-ENTMCNC: 29.8 G/DL (ref 28.4–34.8)
MCV RBC AUTO: 111 FL (ref 82.6–102.9)
MONOCYTES # BLD: 8 % (ref 1–7)
NRBC AUTOMATED: 0 PER 100 WBC
PDW BLD-RTO: 13.1 % (ref 11.8–14.4)
PLATELET # BLD: 453 K/UL (ref 138–453)
PMV BLD AUTO: 9.2 FL (ref 8.1–13.5)
POTASSIUM SERPL-SCNC: 4.5 MMOL/L (ref 3.7–5.3)
PROLACTIN: 26.4 NG/ML (ref 4.79–23.3)
RBC # BLD: 4.38 M/UL (ref 3.95–5.11)
SEG NEUTROPHILS: 60 % (ref 36–66)
SEGMENTED NEUTROPHILS ABSOLUTE COUNT: 4.2 K/UL (ref 1.8–7.7)
SODIUM BLD-SCNC: 134 MMOL/L (ref 135–144)
TOTAL CK: 344 U/L (ref 26–192)
TROPONIN, HIGH SENSITIVITY: 15 NG/L (ref 0–14)
WBC # BLD: 7 K/UL (ref 3.5–11.3)

## 2022-04-29 PROCEDURE — 84146 ASSAY OF PROLACTIN: CPT

## 2022-04-29 PROCEDURE — 82947 ASSAY GLUCOSE BLOOD QUANT: CPT

## 2022-04-29 PROCEDURE — 82550 ASSAY OF CK (CPK): CPT

## 2022-04-29 PROCEDURE — 70551 MRI BRAIN STEM W/O DYE: CPT

## 2022-04-29 PROCEDURE — 70450 CT HEAD/BRAIN W/O DYE: CPT

## 2022-04-29 PROCEDURE — 83605 ASSAY OF LACTIC ACID: CPT

## 2022-04-29 PROCEDURE — 84484 ASSAY OF TROPONIN QUANT: CPT

## 2022-04-29 PROCEDURE — 80048 BASIC METABOLIC PNL TOTAL CA: CPT

## 2022-04-29 PROCEDURE — 71045 X-RAY EXAM CHEST 1 VIEW: CPT

## 2022-04-29 PROCEDURE — 99449 NTRPROF PH1/NTRNET/EHR 31/>: CPT | Performed by: PSYCHIATRY & NEUROLOGY

## 2022-04-29 PROCEDURE — 99284 EMERGENCY DEPT VISIT MOD MDM: CPT

## 2022-04-29 PROCEDURE — 85025 COMPLETE CBC W/AUTO DIFF WBC: CPT

## 2022-04-29 ASSESSMENT — ENCOUNTER SYMPTOMS
EYE DISCHARGE: 0
EYE PAIN: 0
ABDOMINAL DISTENTION: 0
SHORTNESS OF BREATH: 0
BACK PAIN: 0
CHEST TIGHTNESS: 0
ABDOMINAL PAIN: 0
FACIAL SWELLING: 0

## 2022-04-29 NOTE — ED PROVIDER NOTES
EMERGENCY DEPARTMENT ENCOUNTER    Pt Name: Nirav Polanco  MRN: [id number]  [place] 1955  Date of evaluation: 4/29/22  CHIEF COMPLAINT       Chief Complaint   Patient presents with    Aphasia     facial droop     HISTORY OF PRESENT ILLNESS   HPI   The patient is a 49-year-old female with a history of CHF recent cerebral hemorrhage who presented to the emergency department by EMS from nursing facility secondary to concern for stroke. EMS was called to the facility at this patient's last known well was 12:30 PM and patient was noted to have slurred speech and a facial droop. Upon double stroke unit, a CT was obtained however patient's symptoms have resolved. Imaging was reviewed by Dr. Brennan Wei who is familiar with patient due to recent stroke. Recommended patient be transported to [facility] for MRI concern for versus stroke. Patient denied headache, she is complain of knee pain. Patient denies a history of COPD or asthma but she states she has been put on oxygen at the facility. She denied chest pain, shortness of breath, nausea, vomiting, fevers or chills. REVIEW OF SYSTEMS     Review of Systems   Constitutional: Negative for chills, diaphoresis and fever. HENT: Negative for congestion, ear pain and facial swelling. Eyes: Negative for pain, discharge and visual disturbance. Respiratory: Negative for chest tightness and shortness of breath. Cardiovascular: Negative for chest pain and palpitations. Gastrointestinal: Negative for abdominal distention and abdominal pain. Genitourinary: Negative for difficulty urinating and flank pain. Musculoskeletal: Negative for back pain. Skin: Negative for wound. Neurological: Negative for dizziness, light-headedness and headaches.      PASTMEDICAL HISTORY     Past Medical History:   Diagnosis Date    Appendicitis 3/27/2017    [facility]     CHF (congestive heart failure) (HCC)     Chronic respiratory failure with hypoxia and hypercapnia (HCC)     Chronic rhinitis     Cigarette smoker     COPD (chronic obstructive pulmonary disease) [facility] 2018    Depression     Dysphagia     Essential hypertension 2018    GERD (gastroesophageal reflux disease)     Heart failure, diastolic, with acute decompensation ([facility] 2018    Hepatitis C, chronic (HCC)     History of smoking at least 1 pack per day for at least 30 years     Hyperlipidemia     Hypertension     Migraine     Moderate COPD (chronic obstructive pulmonary disease) (HCC)     Multiple transfusions     Neurogenic dysphagia     Osteoarthritis     hands    Pneumonia     Scoliosis     Substance abuse ([facility]     ivda and cocaine abuse in past     SURGICAL HISTORY       Past Surgical History:   Procedure Laterality Date    APPENDECTOMY  2017     SECTION      GASTRIC BYPASS SURGERY      HIP SURGERY Right 2019    HIP TOTAL ARTHROPLASTY ANTERIOR APPROACH - [place],  C-ARM, (Right )    LAPAROSCOPIC APPENDECTOMY N/A 3/27/2017    APPENDECTOMY LAPAROSCOPIC performed by Cristhian Yeung MD at [address] [place] [address] Drive [facility]      hgsil    MEDICATION INJECTION Left 3/25/2021    STEROID INJECTION LEFT HIP  WITH FLUORO performed by Yogesh Downing MD at [address] Left 2021    INJECTION STEROID LEFT HIP WITH FLUORO- ATTEMPTED performed by Yogesh Downing MD at [address] [facility] Left 2021    INJECTION STEROID LEFT HIP performed by Yogesh Downing MD at [address] [place] [address] Left 2021    hip injection    SD COLONOSCOPY W/BIOPSY SINGLE/MULTIPLE N/A 11/10/2018    COLONOSCOPY WITH BIOPSY performed by Will Tamez MD at [facility]    SD EGD TRANSORAL BIOPSY SINGLE/MULTIPLE N/A 2018    EGD ESOPHAGOGASTRODUODENOSCOPY performed by Antonio Gongora MD at [address] [place] [address] Right 2019    HIP TOTAL ARTHROPLASTY ANTERIOR APPROACH - [place],  C-ARM, performed by Nghia Cortez DO at [address] [facility]  2008    oesophagitis, candidiasis    UPPER GASTROINTESTINAL ENDOSCOPY  11/05/2018    UPPER GASTROINTESTINAL ENDOSCOPY N/A 11/5/2018    EGD CONTROL HEMORRHAGE performed by Farhat Cadena MD at [address] [place] [address] 4/5/2021    EGD W/EUS FNA performed by Neville Lange MD at [place] [address]       Discharge Medication List as of 4/29/2022  3:52 PM      CONTINUE these medications which have NOT CHANGED    Details   carvedilol (COREG) 12.5 MG tablet Take 1 tablet by mouth 2 times daily (with meals), Disp-60 tablet, R-0Normal      gabapentin (NEURONTIN) 600 MG tablet Take 0.5 tablets by mouth 3 times daily for 7 days. , Disp-11 tablet, R-0Print      mirtazapine (REMERON) 30 MG tablet Take 1 tablet by mouth nightly for 7 days, Disp-7 tablet, R-0Print      dextromethorphan-guaiFENesin (ROBITUSSIN-DM)  MG/5ML syrup Take 10 mLs by mouth every 4 hours as needed for Cough, Disp-1 each, R-1Normal      losartan (COZAAR) 50 MG tablet Take 1 tablet by mouth at bedtime, Disp-30 tablet, R-0Normal      oxyCODONE-acetaminophen (PERCOCET) 5-325 MG per tablet Take 1 tablet by mouth every 6 hours as needed for Pain for up to 7 days. , Disp-15 tablet, R-0Print      pantoprazole (PROTONIX) 40 MG tablet Take 1 tablet by mouth every morning (before breakfast), Disp-30 tablet, R-0Normal      ondansetron (ZOFRAN-ODT) 8 MG TBDP disintegrating tablet Historical Med      famotidine (PEPCID) 20 MG tablet Historical Med      butalbital-acetaminophen-caffeine (FIORICET, ESGIC) -40 MG per tablet Take 1 tablet by mouth every 12 hours for 14 days, Disp-28 tablet, R-0Normal      ferrous sulfate (FE TABS 325) 325 (65 Fe) MG EC tablet Take 1 tablet by mouth daily, Disp-90 tablet, R-3Normal      acetaminophen (TYLENOL) 500 MG tablet Take 2 tablets by mouth every 6 hours as needed for Pain, Disp-40 tablet, R-0Print      mometasone-formoterol (DULERA) 100-5 MCG/ACT inhaler Inhale 2 puffs into the lungs 2 times daily, Disp-1 Inhaler, R-1Normal      atorvastatin (LIPITOR) 40 MG tablet Take 1 tablet by mouth nightly, Disp-30 tablet, R-3Normal      amitriptyline (ELAVIL) 10 MG tablet Take 1 tablet by mouth nightly, Disp-30 tablet, R-3Normal      albuterol sulfate HFA (PROVENTIL HFA) 108 (90 Base) MCG/ACT inhaler Inhale 1-2 puffs into the lungs once for 1 dose, Disp-1 Inhaler, R-0Normal      Misc. Devices [facility]) MISC ONCE Starting 2019, Disp-1 each, R-0, Print      ipratropium-albuterol (DUONEB) 0.5-2.5 (3) MG/3ML SOLN nebulizer solution Inhale 3 mLs into the lungs every 4 hours as needed for Shortness of Breath, Disp-360 mL, R-5Normal      vitamin B-1 100 MG tablet Take 1 tablet by mouth daily, Disp-30 tablet, R-3Normal      vitamin B-12 250 MCG tablet Take 1 tablet by mouth daily, Disp-30 tablet, R-3Normal      Multiple Vitamins-Minerals (THERAPEUTIC MULTIVITAMIN-MINERALS) tablet Take 1 tablet by mouth daily, Disp-30 tablet, R-3Normal           ALLERGIES     is allergic to aspirin and keflet [cephalexin]. FAMILY HISTORY     She indicated that her mother is . She indicated that her father is .      SOCIAL HISTORY       Social History     Tobacco Use    Smoking status: Current Some Day Smoker     Packs/day: 0.50     Years: 40.00     Pack years: 20.00     Types: Cigarettes     Start date: 5     Last attempt to quit: 2018     Years since quitting: 3.4    Smokeless tobacco: Never Used   Vaping Use    Vaping Use: Never used   Substance Use Topics    Alcohol use: No     Alcohol/week: 0.0 standard drinks    Drug use: No     Types: IV, Cocaine     Comment: past history     PHYSICAL EXAM     INITIAL VITALS: /78   Pulse 88   Temp 97.7 °F (36.5 °C) (Oral)   Resp 16   Ht 4' 11\" (1.499 m)   Wt 100 lb (45.4 kg)   LMP 2015   SpO2 (!) 88%   BMI 20.20 kg/m²    Physical Exam  Vitals and nursing note reviewed. Constitutional:       General: She is not in acute distress. Appearance: She is well-developed. She is not diaphoretic. HENT:      Head: Normocephalic and atraumatic. Eyes:      Pupils: Pupils are equal, round, and reactive to light. Cardiovascular:      Rate and Rhythm: Normal rate and regular rhythm. Pulmonary:      Effort: Pulmonary effort is normal.      Breath sounds: Normal breath sounds. Abdominal:      General: Bowel sounds are normal.      Palpations: Abdomen is soft. Musculoskeletal:         General: Normal range of motion. Cervical back: Normal range of motion and neck supple. Skin:     General: Skin is warm. Capillary Refill: Capillary refill takes less than 2 seconds. Neurological:      Mental Status: She is alert and oriented to person, place, and time. MEDICAL DECISION MAKING:   The patient is a 25-year-old female who presented to the emergency department via mobile stroke unit secondary to strokelike symptoms that resolved on arrival.  Patient is in a stroke scale is 0 although she has deficits from previous stroke. Blood sugar 97. Patient discussed with Dr. Mayte Kendall with recommendation for MRI and work-up seizure versus stroke. Orders labs, EKG and imaging. Later in patient's ED course she refused further testing MRI and requested to leave the emergency department. Given that patient presented to the emergency room by EMS from nursing facility discussed with patient providing transport back to the facility however she declined. She proceeded to walk out of the emergency department using a wheelchair to walk with and became verbally abusive to staff. Patient completed AMA documentation I did contact patient's son as patient was walking out of the emergency department in waiting room and went into the parking lot. Patient agreed to wait in the waiting room until her son came to pick her up.   Not have any shoes and no shoes were available for her to wear I can try to get patient to come back to treatment room she refused and remained in the waiting room so her son picked up from the emergency department. All patient's question's and concerns were answered prior to disposition and patient and/or family expressed understanding and agreement of treatment plan. CRITICAL CARE:              NIH STROKE SCALE:  NIH Stroke Scale  Level of Consciousness (1a): Alert  LOC Questions (1b): Answers both correctly  LOC Commands (1c): Performs both tasks correctly  Best Gaze (2): Normal  Visual (3): No visual loss  Facial Palsy (4): Normal symmetrical movement  Motor Arm, Left (5a): Drift, but does not hit bed  Motor Arm, Right (5b): Drift, but does not hit bed  Motor Leg, Left (6a): No drift  Motor Leg, Right (6b): No drift  Limb Ataxia (7): Absent  Sensory (8): Normal  Best Language (9): No aphasia  Dysarthria (10): Normal  Extinction and Inattention (11): No abnormality  Total: 2         PROCEDURES:    Procedures    DIAGNOSTIC RESULTS   EKG:All EKG's are interpreted by the Emergency Department Physician who either signs or Co-signs this chart in the absence of a cardiologist.        RADIOLOGY:All plain film, CT, MRI, and formal ultrasound images (except ED bedside ultrasound) are read by the radiologist, see reports below, unless otherwisenoted in MDM or here. MRI BRAIN WO CONTRAST   Final Result   1. No significant change compared to the recent MRI. 2. No acute infarct. 3. Focus is susceptibility along the left dorsal basal ganglia with minimal   surrounding T2 hyperintensity. 4. Minimal global parenchymal volume loss with mild-to-moderate chronic   microvascular ischemic changes. 5. Chronic lacunar infarcts involving the left caudate, bilateral basal   ganglia as well as the leandro.    6. Tiny foci of susceptibility involving the left basal ganglia, right   thalamus as well as the leandro, which may represent sequelae of hypertensive   microangiopathy. XR CHEST PORTABLE   Final Result   No acute cardiopulmonary disease. LABS: All lab results were reviewed by myself, and all abnormals are listed below. Labs Reviewed   CBC WITH AUTO DIFFERENTIAL - Abnormal; Notable for the following components:       Result Value    Hematocrit 48.6 (*)     .0 (*)     Monocytes 8 (*)     All other components within normal limits   BASIC METABOLIC PANEL W/ REFLEX TO MG FOR LOW K - Abnormal; Notable for the following components:    CREATININE 0.92 (*)     Bun/Cre Ratio 24 (*)     Sodium 134 (*)     Chloride 95 (*)     All other components within normal limits   PROLACTIN - Abnormal; Notable for the following components:    Prolactin 26.40 (*)     All other components within normal limits   CK - Abnormal; Notable for the following components: Total  (*)     All other components within normal limits   TROPONIN - Abnormal; Notable for the following components:    Troponin, High Sensitivity 15 (*)     All other components within normal limits   LACTIC ACID   POC GLUCOSE FINGERSTICK   POCT GLUCOSE       EMERGENCY DEPARTMENTCOURSE:         Vitals:    Vitals:    04/29/22 1411 04/29/22 1418   BP: 136/78    Pulse: 88    Resp: 16    Temp: 97.7 °F (36.5 °C)    TempSrc: Oral    SpO2:  (!) 88%   Weight: 100 lb (45.4 kg)    Height: 4' 11\" (1.499 m)        The patient was given the following medications while in the emergency department:  No orders of the defined types were placed in this encounter. CONSULTS:  None    FINAL IMPRESSION      1. TIA (transient ischemic attack)          DISPOSITION/PLAN   DISPOSITION [place] 04/29/2022 03:51:55 PM      PATIENT REFERRED TO:  No follow-up provider specified.   DISCHARGE MEDICATIONS:  Discharge Medication List as of 4/29/2022  9:95 PM        Rigoberto Song MD  Attending Emergency Physician      The care is provided during an unprecedented national emergency due to the novel coronavirus, COVID 19. This note was created with the assistance of a speech-recognition program. While intending to generate a document that actually reflects the content of the visit, no guarantees can be provided that every mistake has been identified and corrected by editing.     Rosales Chery MD  91/86/88 8244       Liliana Pleitez MD  68/90/89 6748

## 2022-04-29 NOTE — ED NOTES
..    4/29/2022 1:53 PM    Patient: Debbie John, 69 y.o., female Race:   Patient Address: [address] [facility] [address]  Incident Address:  [address], [address] [facility]    [x] [place]  [] [facility]  [] [facility] ORTHOPEDIC AND SPINE [facility] AT [place]   [] ADILSON PYLE JR. [facility]  [] [facility]  Patient Phone #: [phone]   Insurance: Payor: Shola Urbina /  /  /   Dewayne Grimes:  []  Yes   [x]  No  Emergency Contact: Extended Emergency Contact Information  Primary Emergency Contact: Neville Shah  Home Phone: [phone]  Mobile Phone: [phone]  Relation: Child  MRN: [id number]  [facility]# [id number]  YOB: 1955  Primary Care Physician: Fozia Dozier MD  Advance Directive: [x] Full Code   []DNR-CC   []DNR-CCA    MSU Crew: Lefty Aguayo - CT Tech, T Druhot - Paramedic, Kera Tellez RN    Pt Transported To:  [] [place] [address]  [x] [facility]  [] [facility]  [] [facility]  [] [facility]  [] [facility]  [facility] [] [facility] [] [facility]    Was patient transported to closest facility? [x]Yes  []No (if No specify reason)   []   Patient/family request    []   Divert to specialist       Response Code   [] 2  [x] 3  []   Change  [] 2  [] 3   Transport Code   [x] 2  [] 3  []   Change  [] 2  [] 3     Mileage:  [address] [address] [address] [address]   Total Miles 1.2     Call Received 4/29/2022 3074   Dispatched 4/29/2022 1242   Enroute 4/29/2022 1246   Arrived Scene 4/29/2022 1258   At Patient 4/29/2022 1302   Decision to Scan 4/29/2022 1304   Scan 4/29/2022 1314   Departed Scene 4/29/2022 [address] 4/29/2022 1333   Departed Hospital 4/29/2022 1352   In Service 4/29/2022 1352         Allergies  [] Updated/Reviewed by MSU  [x] Historical Data Only  [] Unavailable  is allergic to aspirin and keflet [cephalexin]. Medications  [] Updated/Reviewed by MSU  [x] Historical Data Only  [] Unavailable  Prior to Admission medications    Medication Sig Start Date End Date Taking?  Authorizing Provider   carvedilol (COREG) 12.5 MG tablet Take 1 tablet by mouth 2 times daily (with meals) 4/24/22 5/24/22  Rogelio Christiansen MD   gabapentin (NEURONTIN) 600 MG tablet Take 0.5 tablets by mouth 3 times daily for 7 days. 4/24/22 5/1/22  Rogelio Christiansen MD   mirtazapine (REMERON) 30 MG tablet Take 1 tablet by mouth nightly for 7 days 4/24/22 5/1/22  Rogelio Christiansen MD   dextromethorphan-guaiFENesin (ROBITUSSIN-DM)  MG/5ML syrup Take 10 mLs by mouth every 4 hours as needed for Cough 4/24/22   Rogelio Christiansen MD   losartan (COZAAR) 50 MG tablet Take 1 tablet by mouth at bedtime 4/24/22 5/24/22  Rogelio Christiansen MD   oxyCODONE-acetaminophen (PERCOCET) 5-325 MG per tablet Take 1 tablet by mouth every 6 hours as needed for Pain for up to 7 days.  4/24/22 5/1/22  Rogelio Christiansen MD   pantoprazole (PROTONIX) 40 MG tablet Take 1 tablet by mouth every morning (before breakfast) 4/24/22   Rogelio Christiansen MD   ondansetron (ZOFRAN-ODT) 8 MG TBDP disintegrating tablet  9/23/21   Historical Provider, MD   famotidine (PEPCID) 20 MG tablet  8/24/21   Historical Provider, MD   butalbital-acetaminophen-caffeine (FIORICET, ESGIC) -40 MG per tablet Take 1 tablet by mouth every 12 hours for 14 days 5/13/21 9/27/21  ISIDORO Novak MD   ferrous sulfate (FE TABS 325) 325 (65 Fe) MG EC tablet Take 1 tablet by mouth daily 5/14/21   ISIDORO Novak MD   acetaminophen (TYLENOL) 500 MG tablet Take 2 tablets by mouth every 6 hours as needed for Pain 5/7/21   Tara Leung DO   mometasone-formoterol (DULERA) 100-5 MCG/ACT inhaler Inhale 2 puffs into the lungs 2 times daily 4/6/21   Jax Howell DO   atorvastatin (LIPITOR) 40 MG tablet Take 1 tablet by mouth nightly 4/6/21   Jax Howell    amitriptyline (ELAVIL) 10 MG tablet Take 1 tablet by mouth nightly 4/6/21   Jax Howell DO   albuterol sulfate HFA (PROVENTIL HFA) 108 (90 Base) MCG/ACT inhaler Inhale 1-2 puffs into the lungs once for 1 dose 4/6/21 9/27/21  Jax Howell DO   amLODIPine (NORVASC) 10 MG tablet Take 1 tablet by mouth daily 2/9/21 2/9/21  Danni Baker MD   Misc. Devices [facility]) MISC 1 Device by Does not apply route once for 1 dose 8/5/19 6/6/21  Shahrzad Kelly PA-C   ipratropium-albuterol (DUONEB) 0.5-2.5 (3) MG/3ML SOLN nebulizer solution Inhale 3 mLs into the lungs every 4 hours as needed for Shortness of Breath 1/11/19   Nikky Russell MD   vitamin B-1 100 MG tablet Take 1 tablet by mouth daily 10/13/18   Dank Ibrahim MD   vitamin B-12 250 MCG tablet Take 1 tablet by mouth daily 10/13/18   Dank Ibrahim MD   Multiple Vitamins-Minerals (THERAPEUTIC MULTIVITAMIN-MINERALS) tablet Take 1 tablet by mouth daily 8/12/18   Morey Primrose, MD      Past Medical History  [] Updated/Reviewed by MSU  [x] Historical Data Only  [] Unavailable   has a past medical history of Appendicitis, Cerebral artery occlusion with cerebral infarction [facility]), CHF (congestive heart failure) ([address] Chronic respiratory failure with hypoxia and hypercapnia ([address] Chronic rhinitis, Cigarette smoker, COPD (chronic obstructive pulmonary disease) ([address] Depression, Dysphagia, Essential hypertension, GERD (gastroesophageal reflux disease), Heart failure, diastolic, with acute decompensation ([address] Hepatitis C, chronic ([address] History of smoking at least 1 pack per day for at least 30 years, Hyperlipidemia, Hypertension, Migraine, Moderate COPD (chronic obstructive pulmonary disease) ([address] Multiple transfusions, Neurogenic dysphagia, Osteoarthritis, Pneumonia, Scoliosis, and Substance abuse ([address]     Patient Weight: 120 lbs [x]   Estimated   []   Stated          VITALS          Time BP Pulse   Resp  Rate N-normal  S-shallow  D-deep Monitor SpO2 O2    LPM BGL   Temp Pain   1313 141/77 93 19 N NSR 95 2L 122 99.6 T 0   1319 126/83 94 20 N NSR 94 2L      1323 151/87 94 17 N NSR 95 2L      1328 150/86 95 17 N NSR 93 2L      1333 147/86 94 24 N NSR 94 2L         Pupils GCS   Time R L E  4 V  5 M  6 T  15   1303 2 2 4 5 6 15                                           NIH -   record on all transports and Q15 min after tPA administration    NIHSS       Time 1302      1a. LOC - Arousal Status 0      1b. LOC - Questions 0      1c. LOC - Commands 0      2. Eye Movements (gaze) 0      3. Visual Fields 0      4. Facial Palsy 0      5a. Motor Left Arm 0      5b. Motor Right Arm 0      6a. Motor Left Leg 0      6b. Motor Right Leg 0      7. Limb Ataxia 0      8. Sensory 0      9. Language/Aphasia 0      10. Dysarthria 0      11. Neglect 0      TOTAL 0          Research:    RACE Score: 0 Time: 3735  StrokeVAN Score: 0 Time: 7621    Time Last Known Well: 3093 04/29/2022    Narrative:    Dispatched to possible CVA per [facility]. Arrived to find Emi Daniels, 69 y.o., female c/o right sided weakness. Report received from # 3 EMS at patients side.  at patient side via telemedicine unit. EMS states that pt currently in nursing facility for rehab post cerebral bleed on 4/20/2022. Nurse stated at approx [phone] pt developed right sided weakness and was leaning to the right in her chair. Upon our arrival all pts symptoms had resolved and pt back to baseline. Due to pt history orders received to complete CT scan on the pt and transport to closest facility. Dr. Mercy Cabot reviewed scan and sees no change from previous scan at this time. Report called to Orin Rhoades along with ETA. Patient received the following medications prior to MSU arrival: NA  Patient has the following lines prior to MSU arrival: NA  Patient has the following airway placed prior to MSU arrival: NA    Patient was transferred to cot via 2 person assist and secured with straps x3. Zoll ECG, SpO2, and NIBP applied and monitored throughout encounter. HOB maintained at 30 degrees. Patient was transferred to ambulance, cot secured in scan position. Non contrast CT scan performed. After scan cot secured in transport position.      IV tPA inclusion/exclusion criteria reviewed with patient and physician. Candidate for IV tPA therapy? [] Yes   [x] No - due to the following exclusion criteria:    [] ICH   [] Taking anticoagulant -   [] Beyond last time known well window  [] At or returned to baseline   [] Marked improvement of symptoms  [] No disabling symptoms  [x] Other - Recent bleed    Patient is being transported to [facility]. During transport patient rests comfortably. Cabin temp maintained at 70-72 °F throughout transport. Patient was transferred to bed ER # 22 via 4 person sheet lift. . Patient care handoff completed with [place] RN at bedside. Imaging:  Images were obtained onboard the MSU including:  [x] CT brain without contrast - see results below:      CT Head WO Contrast    Result Date: 4/29/2022  EXAMINATION: CT OF THE HEAD WITHOUT CONTRAST  4/29/2022 1:10 pm TECHNIQUE: CT of the head was performed without the administration of intravenous contrast. Dose modulation, iterative reconstruction, and/or weight based adjustment of the mA/kV was utilized to reduce the radiation dose to as low as reasonably achievable. COMPARISON: 04/21/2022. HISTORY: ORDERING SYSTEM PROVIDED HISTORY: Stroke TECHNOLOGIST PROVIDED HISTORY: Stroke Initial evaluation. FINDINGS: Motion degrades images limiting evaluation. BRAIN/VENTRICLES: There is no significant change in the small hyperdensity in the region of the dorsal left basal ganglia. No new focus of intracranial hemorrhage clearly identified. The gray-white differentiation is grossly maintained. No evidence of mass effect or midline shift. There are areas of hypoattenuation in the periventricular and subcortical white matter, which is nonspecific, but may represent chronic microvasvular ischemic change. There is minimal global parenchymal volume loss. Atherosclerosis of the intracranial vasculature is noted. ORBITS: The visualized portion of the orbits demonstrate no acute abnormality.  SINUSES: Minimal coastal thickening of the right maxillary sinus. The mastoid air cells demonstrate no acute abnormality. SOFT TISSUES/SKULL:  No acute abnormality of the visualized skull or soft tissues. 1. Patient motion limits evaluation. 2. No gross change when compared to the recent CT. 3. Unchanged focus of hyperdensity involving the left dorsal basal ganglia. 4. No new intracranial hemorrhage is seen. 5. Minimal global parenchymal volume loss with chronic microvascular ischemic changes. 6. Atherosclerosis of the intracranial vasculature. These results were sent to the [facility] [address] ([address] [place] [address]) on 4/29/2022 at 1:29 pm to be communicated to the referring/covering health care provider/office. Physical assessment performed:    Neuro: Pt is A/O x3 Pt has midline gaze. Pt follows commands without difficulty. Pt has no facial droop. Pt hand grasps equal bilat. Pt has a brace on her right knee but is able to move both lower extremities. No sensory deficits. Resp: lungs clear to auscultation bilaterally, normal effort  Cardiac: regular rate, no murmur, 12 lead ECG shows NSR  Muscular/skeletal/peripheral vascular: no edema, no redness or tenderness in the calves, pulses present in 4 extremities   Abd/GI/: abd flat, soft, non tender, bowel sounds present x 4 quadrants   Skin: normal color, warm to touch dry Temp 99.6 temporal      IV LINES   Time Size Site # Attempts Performed By                            Total Amount of IV Fluids Infused: NA    MEDS / ELECTRICAL RX   Time Therapy Dosage Route Response Performed By   NA                                                    TPA Administration    Time Bolus Dose Infusion Dose Waste   NA                [] tPA dosing double checked by:      ACUTE STROKE DYSPHAGIA SCREEN              YES NO   1 GCS less than 13?         2 Facial Asymmetry or Weakness? 3 Tongue Asymmetry or Weakness? 4 Palatal Asymmetry or Weakness?        5 Signs of aspiration during 3oz water test?* If answers to questions 1-4 are NO proceed to 3oz water test               *Administer 3oz of water for sequential drinks, note any throat clearing, cough, or change in vocal quality immediately after and 1 minute following the swallow. If answers to questions 1-5 are NO proceed with ordered PO meds       POC LABS      TIME NA     Test (reference range)      FSBG ()      PT (11-13.5)      INR (0.8-1.1)      Na (138-146)      K (3.5-4.9)      Cl ()      iCa (1.2-1.32)      TCO2 (24-29)      Glu ()      BUN (8.0-26)      Crea (0.6-1. 3)      Hct (38-51)      Hb (12.0-17)      AnGap 10.0-20)                Assistance:   [x]    Fire - Q37    []    Police    [x]    Other EMS (See Below)    LS#3      Hospital Notification:    [address] -  [] [address] [address]  [] [facility]  [] [facility]  [] [facility]  [] [facility] [] [facility] [x] [facility] [] [facility]  [] [employer]     [x]    Med Channel:     []    Cell Phone     Med Control Orders Received:   [x] No  []  Yes:     Med Control Physician (if on line medical direction received)  -        Hospital Team Alert:   []    Trauma Alert    []    STEMI Alert         []    Stroke Alert    []    RACE Alert      Description Of Valuables: glasses gold earrings    Patient Valuables:   [x]    With Patient    []    Not Received    []    ER / Lab     Call Outcome:   [x]    Transport to Facility    []    Care Transferred to [facility]    []    Cancelled    []    Patient Refusal    []                           Mobile Stroke Unit    Electronically signed by Ghada Horton, RN on 4/29/22 at 1:53 PM EDT     Mario Dias RN  04/29/22 1515

## 2022-04-29 NOTE — VIRTUAL HEALTH
1 [facility] Stroke and Vascular Neurology Consult for  [facility] Stroke Unit Stroke Alert through [address] @ 12:50  4/29/2022 2:15 PM  Pt Name: Nirav Polanco  MRN: [id number]  YOB: 1955  Date of evaluation: 4/29/2022  Primary Care Physician: Zulema Wadsworth MD  Reason for Evaluation: Stroke Evaluation with Discussion with Ed or primary team with Telemedicine and stroke evaluation with Review of imaging and labs    Emi Poole is a 69 y.o. female who presents with recent hospitalization for hypertensive 5mm posterior limb left internal capsule hemorrhage. Stable on ct head and mri brain 1 week ago. Discharged to NH. Evaluated for concern of increased right sided leaning and possible right weakness worse than baseline. Improved during msu evaluation. Ct head with unchanged punctate 5mm hemorrhage. tx to [facility] for medical workup. LKW: 12:30  NIH:  0    Allergies  is allergic to aspirin and keflet [cephalexin]. Medications  Prior to Admission medications    Medication Sig Start Date End Date Taking? Authorizing Provider   carvedilol (COREG) 12.5 MG tablet Take 1 tablet by mouth 2 times daily (with meals) 4/24/22 5/24/22  Lenin Doran MD   gabapentin (NEURONTIN) 600 MG tablet Take 0.5 tablets by mouth 3 times daily for 7 days. 4/24/22 5/1/22  Lenin Doran MD   mirtazapine (REMERON) 30 MG tablet Take 1 tablet by mouth nightly for 7 days 4/24/22 5/1/22  Lenin Doran MD   dextromethorphan-guaiFENesin (ROBITUSSIN-DM)  MG/5ML syrup Take 10 mLs by mouth every 4 hours as needed for Cough 4/24/22   Lenin Doran MD   losartan (COZAAR) 50 MG tablet Take 1 tablet by mouth at bedtime 4/24/22 5/24/22  Lenin Doran MD   oxyCODONE-acetaminophen (PERCOCET) 5-325 MG per tablet Take 1 tablet by mouth every 6 hours as needed for Pain for up to 7 days.  4/24/22 5/1/22  Lenin Doran MD   pantoprazole ([address]) 40 MG tablet Take 1 tablet by mouth every morning (before breakfast) 4/24/22   Honey Alvarez MD   ondansetron (ZOFRAN-ODT) 8 MG TBDP disintegrating tablet  9/23/21   Historical Provider, MD   famotidine (PEPCID) 20 MG tablet  8/24/21   Historical Provider, MD   butalbital-acetaminophen-caffeine (FIORICET, ESGIC) -40 MG per tablet Take 1 tablet by mouth every 12 hours for 14 days 5/13/21 9/27/21  E Alicja Alcaraz MD   ferrous sulfate (FE TABS 325) 325 (65 Fe) MG EC tablet Take 1 tablet by mouth daily 5/14/21   E Alicja Alcaraz MD   acetaminophen (TYLENOL) 500 MG tablet Take 2 tablets by mouth every 6 hours as needed for Pain 5/7/21   Tara Leung,    mometasone-formoterol (DULERA) 100-5 MCG/ACT inhaler Inhale 2 puffs into the lungs 2 times daily 4/6/21   Ardath Barges, DO   atorvastatin (LIPITOR) 40 MG tablet Take 1 tablet by mouth nightly 4/6/21   Ardath Barges, DO   amitriptyline (ELAVIL) 10 MG tablet Take 1 tablet by mouth nightly 4/6/21   Ardath Barges, DO   albuterol sulfate HFA (PROVENTIL HFA) 108 (90 Base) MCG/ACT inhaler Inhale 1-2 puffs into the lungs once for 1 dose 4/6/21 9/27/21  Ardath Barges, DO   amLODIPine (NORVASC) 10 MG tablet Take 1 tablet by mouth daily 2/9/21 2/9/21  Marysol Wilkinson MD   [facility]  Devices [facility]) MISC 1 Device by Does not apply route once for 1 dose 8/5/19 6/6/21  Joseline Lloyd PA-C   ipratropium-albuterol (DUONEB) 0.5-2.5 (3) MG/3ML SOLN nebulizer solution Inhale 3 mLs into the lungs every 4 hours as needed for Shortness of Breath 1/11/19   Abdifatah Campos MD   vitamin B-1 100 MG tablet Take 1 tablet by mouth daily 10/13/18   Amy Reich MD   vitamin B-12 250 MCG tablet Take 1 tablet by mouth daily 10/13/18   Amy Reich MD   Multiple Vitamins-Minerals (THERAPEUTIC MULTIVITAMIN-MINERALS) tablet Take 1 tablet by mouth daily 8/12/18   Babita Agudelo MD    Scheduled Meds:  Continuous Infusions:  PRN Meds:.  Past Medical History   has a past medical history of Appendicitis, Cerebral artery occlusion with cerebral infarction [facility]), CHF (congestive heart failure) (HCC), Chronic respiratory failure with hypoxia and hypercapnia (HCC), Chronic rhinitis, Cigarette smoker, COPD (chronic obstructive pulmonary disease) ([facility] Depression, Dysphagia, Essential hypertension, GERD (gastroesophageal reflux disease), Heart failure, diastolic, with acute decompensation ([facility] Hepatitis C, chronic ([facility] History of smoking at least 1 pack per day for at least 30 years, Hyperlipidemia, Hypertension, Migraine, Moderate COPD (chronic obstructive pulmonary disease) ([facility] Multiple transfusions, Neurogenic dysphagia, Osteoarthritis, Pneumonia, Scoliosis, and Substance abuse ([facility] Social History  Social History     Socioeconomic History    Marital status: Single     Spouse name: Not on file    Number of children: Not on file    Years of education: Not on file    Highest education level: Not on file   Occupational History     Employer: DISABLED   Tobacco Use    Smoking status: Current Some Day Smoker     Packs/day: 0.50     Years: 40.00     Pack years: 20.00     Types: Cigarettes     Start date: 5     Last attempt to quit: 11/2018     Years since quitting: 3.4    Smokeless tobacco: Never Used   Vaping Use    Vaping Use: Never used   Substance and Sexual Activity    Alcohol use: No     Alcohol/week: 0.0 standard drinks    Drug use: No     Types: IV, Cocaine     Comment: past history    Sexual activity: Not on file   Other Topics Concern    Not on file   Social History Narrative    Not on file     Social Determinants of Health     Financial Resource Strain:     Difficulty of Paying Living Expenses: Not on file   Food Insecurity:     Worried About Running Out of Food in the Last Year: Not on file    Toby of Food in the Last Year: Not on file   Transportation Needs:     Lack of Transportation (Medical): Not on file    Lack of Transportation (Non-Medical):  Not on file   Physical Activity:  Days of Exercise per Week: Not on file    Minutes of Exercise per Session: Not on file   Stress:     Feeling of Stress : Not on file   Social Connections:     Frequency of Communication with Friends and Family: Not on file    Frequency of Social Gatherings with Friends and Family: Not on file    Attends Orthodox Services: Not on file    Active Member of [address] [place] [address] [employer] or Organizations: Not on file    Attends Club or Organization Meetings: Not on file    Marital Status: Not on file   Intimate Partner Violence:     Fear of Current or Ex-Partner: Not on file    Emotionally Abused: Not on file    Physically Abused: Not on file    Sexually Abused: Not on file   Housing Stability:     Unable to Pay for Housing in the Last Year: Not on file    Number of [place] in the Last Year: Not on file    Unstable Housing in the Last Year: Not on file     Family History      Problem Relation Age of Onset    Breast Cancer Mother     Heart Disease Father        OBJECTIVE  /78   Pulse 88   Temp 97.7 °F (36.5 °C) (Oral)   Resp 16   Ht 4' 11\" (1.499 m)   Wt 100 lb (45.4 kg)   LMP 2015   SpO2 (!) 88%   BMI 20.20 kg/m²     NIH Stroke Scale  Level of Consciousness (1a): Alert  LOC Questions (1b): Answers both correctly  LOC Commands (1c): Performs both tasks correctly  Best Gaze (2): Normal  Visual (3): No visual loss  Facial Palsy (4): Normal symmetrical movement  Motor Arm, Left (5a): Drift, but does not hit bed  Motor Arm, Right (5b): Drift, but does not hit bed  Motor Leg, Left (6a): No drift  Motor Leg, Right (6b): No drift  Limb Ataxia (7): Absent  Sensory (8): Normal  Best Language (9): No aphasia  Dysarthria (10): Normal  Extinction and Inattention (11): No abnormality  Total: 2  Pre-Morbid mRS: 2    Imaging:  Images were personally reviewed with VIZ. AI and PACS used to review images including:  CT brain without contrast: stable punctate 5mm left posterior limb hemorrhage  CTA imagin2022 unremarkable    Assessment    69 y.o. female who presents with recent hospitalization for hypertensive 5mm posterior limb left internal capsule hemorrhage. Stable on ct head and mri brain 1 week ago. Differential DDx:  1. Left stable posterior limb htn hemorrhage      Recommendations:  1. NIH 1  2. Recommend Outpatient Neurology Consult for further assessment and evaluation   3. Mri brain reviewed. No acute stroke. Stable hemorrhage and chronic strokes  4. followup outpatient  5. Normotensive sbp <140        Discussed with ED Physician    At least 50 min of Telemedicine and time in conversation directly with ED staff and physician for the patient who is in imminent and life threatening deterioration without further treatment and evaluation. This Virtual Visit was conducted with patient's (and/or legal guardian's) consent, to provide telestroke consultation and necessary medical care. Time spent examining patient, reviewing the images personally, reviewing the chart, perform high complexity decision making and speaking with the nursing staff regarding recommendations      Boris Reese MD, MD   Stroke, Neurocritical Care And/or [address] [place] [address] Stroke [address]  Electronically signed 4/29/2022 at 5:15 PM    Patient Location:  [facility] ED    Provider Location (City/State): [place], [place]    This virtual visit was conducted via interactive/real-time audio/video.

## 2022-04-30 ENCOUNTER — HOSPITAL ENCOUNTER (EMERGENCY)
Age: 67
Discharge: HOME | End: 2022-04-30
Attending: EMERGENCY MEDICINE
Payer: MEDICARE

## 2022-04-30 ENCOUNTER — APPOINTMENT (OUTPATIENT)
Dept: CT IMAGING | Age: 67
End: 2022-04-30
Payer: MEDICARE

## 2022-04-30 VITALS
HEART RATE: 84 BPM | RESPIRATION RATE: 19 BRPM | WEIGHT: 100 LBS | HEIGHT: 59 IN | OXYGEN SATURATION: 97 % | DIASTOLIC BLOOD PRESSURE: 76 MMHG | BODY MASS INDEX: 20.16 KG/M2 | TEMPERATURE: 99.2 F | SYSTOLIC BLOOD PRESSURE: 158 MMHG

## 2022-04-30 DIAGNOSIS — R10.13 ABDOMINAL PAIN, EPIGASTRIC: Primary | ICD-10-CM

## 2022-04-30 DIAGNOSIS — K86.89 PANCREATIC MASS: ICD-10-CM

## 2022-04-30 LAB
ABSOLUTE EOS #: 0.06 K/UL (ref 0–0.44)
ABSOLUTE IMMATURE GRANULOCYTE: <0.03 K/UL (ref 0–0.3)
ABSOLUTE LYMPH #: 1.12 K/UL (ref 1.1–3.7)
ABSOLUTE MONO #: 0.78 K/UL (ref 0.1–1.2)
ALBUMIN SERPL-MCNC: 4.2 G/DL (ref 3.5–5.2)
ALBUMIN/GLOBULIN RATIO: 1 (ref 1–2.5)
ALP BLD-CCNC: 146 U/L (ref 35–104)
ALT SERPL-CCNC: 55 U/L (ref 5–33)
ANION GAP SERPL CALCULATED.3IONS-SCNC: 15 MMOL/L (ref 9–17)
AST SERPL-CCNC: 79 U/L
BASOPHILS # BLD: 0 % (ref 0–2)
BASOPHILS ABSOLUTE: <0.03 K/UL (ref 0–0.2)
BILIRUB SERPL-MCNC: 0.46 MG/DL (ref 0.3–1.2)
BUN BLDV-MCNC: 18 MG/DL (ref 8–23)
CALCIUM SERPL-MCNC: 9.8 MG/DL (ref 8.6–10.4)
CHLORIDE BLD-SCNC: 98 MMOL/L (ref 98–107)
CO2: 23 MMOL/L (ref 20–31)
CREAT SERPL-MCNC: 0.62 MG/DL (ref 0.5–0.9)
EOSINOPHILS RELATIVE PERCENT: 1 % (ref 1–4)
GFR AFRICAN AMERICAN: >60 ML/MIN
GFR NON-AFRICAN AMERICAN: >60 ML/MIN
GFR SERPL CREATININE-BSD FRML MDRD: ABNORMAL ML/MIN/{1.73_M2}
GLUCOSE BLD-MCNC: 64 MG/DL (ref 70–99)
HCT VFR BLD CALC: 42.7 % (ref 36.3–47.1)
HEMOGLOBIN: 13.5 G/DL (ref 11.9–15.1)
IMMATURE GRANULOCYTES: 0 %
LIPASE: 38 U/L (ref 13–60)
LYMPHOCYTES # BLD: 23 % (ref 24–43)
MCH RBC QN AUTO: 33.9 PG (ref 25.2–33.5)
MCHC RBC AUTO-ENTMCNC: 31.6 G/DL (ref 28.4–34.8)
MCV RBC AUTO: 107.3 FL (ref 82.6–102.9)
MONOCYTES # BLD: 16 % (ref 3–12)
NRBC AUTOMATED: 0 PER 100 WBC
PDW BLD-RTO: 12.7 % (ref 11.8–14.4)
PLATELET # BLD: 440 K/UL (ref 138–453)
PMV BLD AUTO: 9.5 FL (ref 8.1–13.5)
POTASSIUM SERPL-SCNC: 4.5 MMOL/L (ref 3.7–5.3)
RBC # BLD: 3.98 M/UL (ref 3.95–5.11)
RBC # BLD: ABNORMAL 10*6/UL
SEG NEUTROPHILS: 60 % (ref 36–65)
SEGMENTED NEUTROPHILS ABSOLUTE COUNT: 2.98 K/UL (ref 1.5–8.1)
SODIUM BLD-SCNC: 136 MMOL/L (ref 135–144)
TOTAL PROTEIN: 8.4 G/DL (ref 6.4–8.3)
WBC # BLD: 5 K/UL (ref 3.5–11.3)

## 2022-04-30 PROCEDURE — 6360000004 HC RX CONTRAST MEDICATION: Performed by: STUDENT IN AN ORGANIZED HEALTH CARE EDUCATION/TRAINING PROGRAM

## 2022-04-30 PROCEDURE — 96375 TX/PRO/DX INJ NEW DRUG ADDON: CPT

## 2022-04-30 PROCEDURE — 74177 CT ABD & PELVIS W/CONTRAST: CPT

## 2022-04-30 PROCEDURE — 6360000002 HC RX W HCPCS: Performed by: STUDENT IN AN ORGANIZED HEALTH CARE EDUCATION/TRAINING PROGRAM

## 2022-04-30 PROCEDURE — 99285 EMERGENCY DEPT VISIT HI MDM: CPT

## 2022-04-30 PROCEDURE — 6370000000 HC RX 637 (ALT 250 FOR IP): Performed by: STUDENT IN AN ORGANIZED HEALTH CARE EDUCATION/TRAINING PROGRAM

## 2022-04-30 PROCEDURE — 85025 COMPLETE CBC W/AUTO DIFF WBC: CPT

## 2022-04-30 PROCEDURE — 83690 ASSAY OF LIPASE: CPT

## 2022-04-30 PROCEDURE — 96374 THER/PROPH/DIAG INJ IV PUSH: CPT

## 2022-04-30 PROCEDURE — 80053 COMPREHEN METABOLIC PANEL: CPT

## 2022-04-30 RX ORDER — MORPHINE SULFATE 4 MG/ML
4 INJECTION, SOLUTION INTRAMUSCULAR; INTRAVENOUS ONCE
Status: COMPLETED | OUTPATIENT
Start: 2022-04-30 | End: 2022-04-30

## 2022-04-30 RX ORDER — ONDANSETRON 2 MG/ML
4 INJECTION INTRAMUSCULAR; INTRAVENOUS ONCE
Status: COMPLETED | OUTPATIENT
Start: 2022-04-30 | End: 2022-04-30

## 2022-04-30 RX ORDER — OXYCODONE HYDROCHLORIDE 5 MG/1
5 TABLET ORAL ONCE
Status: COMPLETED | OUTPATIENT
Start: 2022-04-30 | End: 2022-04-30

## 2022-04-30 RX ADMIN — MORPHINE SULFATE 4 MG: 4 INJECTION INTRAVENOUS at 16:32

## 2022-04-30 RX ADMIN — ONDANSETRON 4 MG: 2 INJECTION INTRAMUSCULAR; INTRAVENOUS at 16:33

## 2022-04-30 RX ADMIN — OXYCODONE 5 MG: 5 TABLET ORAL at 18:54

## 2022-04-30 RX ADMIN — IOPAMIDOL 75 ML: 755 INJECTION, SOLUTION INTRAVENOUS at 17:32

## 2022-04-30 ASSESSMENT — PAIN DESCRIPTION - LOCATION: LOCATION: ABDOMEN

## 2022-04-30 ASSESSMENT — PAIN - FUNCTIONAL ASSESSMENT: PAIN_FUNCTIONAL_ASSESSMENT: 0-10

## 2022-04-30 ASSESSMENT — PAIN SCALES - GENERAL
PAINLEVEL_OUTOF10: 10

## 2022-04-30 NOTE — ED NOTES
Pt presents to ED c/o severe abdominal pain x1 day related to pancreatic cancer. Pt also reports migraine x1 day. Patient alert and oriented x4, talking in complete sentences. Respirations even and unlabored. Will continue plan of care.         Tu Lala RN  04/30/22 1371

## 2022-04-30 NOTE — ED PROVIDER NOTES
I performed a history and physical examination of the patient and discussed management with the resident. I reviewed the residents note and agree with the documented findings and plan of care. Any areas of disagreement are noted on the chart. I was personally present for the key portions of any procedures. I have documented in the chart those procedures where I was not present during the key portions. I have reviewed the emergency nurses triage note. I agree with the chief complaint, past medical history, past surgical history, allergies, medications, social and family history as documented unless otherwise noted below. Documentation of the HPI, Physical Exam and Medical Decision Making performed by medical students or scribes is based on my personal performance of the HPI, PE and MDM. For Phys Assistant/ Nurse Practitioner cases/documentation I have personally evaluated this patient and have completed at least one if not all key elements of the E/M (history, physical exam, and MDM). I find the patient's history and physical exam are consistent with the NP/PA documentation. I agree with the care provided, treatment rendered, disposition and followup plan. Additional findings are as noted. Daniel Allison. Bobby Leiv MD  Attending Emergency  Physician      This patient is presenting to the emergency department with complaints of headache which began sometime last evening and abdominal pain which began sometime over night. She denies any trauma to the head of the abdomen. She is complained of some nausea but no vomiting. No diarrhea. No fevers or chills. No disturbance of vision, smell, taste, hearing, speech. No dizziness or vertigo. No numbness, weakness, tingling. No chest pain or difficulty breathing. No cough. Abdominal pain is located in the upper abdomen and radiating to the mid back. No melena, hematochezia, hematemesis. No urinary frequency or urgency. No dysuria. No hematuria.   Past medical history is significant for migraines as well as pancreatic mass. Patient reports that the headache is similar to multiple previous headaches. Not the worst headache of her life. Of note is recent admission approximately week and a half ago for spontaneous hemorrhage within the left basal ganglia. Patient reports the abdominal discomfort is similar to those she normally experiences with respect to her pancreatic mass. Awake, alert. cooperative, responsive, oriented x4. GCS-15. Perrl, EOMI, fundi-flat discs, sharp margins, no hemorrhage or exudate. CN's II-XII intact. Neck supple, nontender. Speech fluent, normal comprehension. No focal motor or sensory deficits. Lungs clear bilaterally. No rales, rhonchi, wheezes, stridor, retractions. Cardiac-S1S2, RRR, no murmur, rub, or gallop. Abdomen soft, nondistended, tenderness in the mid epigastrium and the left lower quadrant. There is no guarding or rebound. .  No organomegaly, mass, bruit. Normal bowel sounds. Impression: Recurrent headache and recurrent abdominal pain. Plan: We will initiate IV access and provide medications for analgesia and for nausea. We will await the results of her blood work as well as CT of the abdomen. Results of blood work and CT have been reviewed. Patient reportedly was able to eat a meal and reported that her symptoms have essentially resolved. Patient will be discharged with instructions to follow-up with her primary care provider and to return to the emergency department should her symptoms worsen, recur, or progress.               Lorena Morgan MD  04/30/22 [id number]

## 2022-04-30 NOTE — ED NOTES
Writer met with patient at discharge in need of transportation. Patient states that she is staying at [address] [facility]. Writer contacted [facility] to confirm residence at facility. Writer informed that since patient AMA'ed from the hospital yesterday, she was discharged from the facility. Patient not understanding of this despite several attempts to assist her with explanation. Patient states she has no place to go but was able to find a friends address to go for the night. Patients insurance company not able to provide transportation to a an address no listed in their system. Writer used a black and white voucher.   Trip number [id number]     [facility], [address]  04/30/22 1936

## 2022-05-01 ENCOUNTER — HOSPITAL ENCOUNTER (INPATIENT)
Age: 67
LOS: 3 days | Discharge: NURSING, REHAB OR LONG-TERM CARE | DRG: 139 | End: 2022-05-04
Attending: EMERGENCY MEDICINE | Admitting: INTERNAL MEDICINE
Payer: MEDICARE

## 2022-05-01 ENCOUNTER — APPOINTMENT (OUTPATIENT)
Dept: GENERAL RADIOLOGY | Age: 67
DRG: 139 | End: 2022-05-01
Payer: MEDICARE

## 2022-05-01 DIAGNOSIS — J44.9 COPD WITHOUT EXACERBATION (HCC): Primary | ICD-10-CM

## 2022-05-01 DIAGNOSIS — I61.9 INTRAPARENCHYMAL HEMORRHAGE OF BRAIN (HCC): ICD-10-CM

## 2022-05-01 DIAGNOSIS — F19.10 POLYSUBSTANCE ABUSE (HCC): ICD-10-CM

## 2022-05-01 DIAGNOSIS — C49.A0 GASTROINTESTINAL STROMAL TUMOR (GIST) (HCC): ICD-10-CM

## 2022-05-01 PROBLEM — E78.5 HLD (HYPERLIPIDEMIA): Status: ACTIVE | Noted: 2022-05-01

## 2022-05-01 PROBLEM — Z87.39 HISTORY OF OSTEONECROSIS: Status: ACTIVE | Noted: 2022-05-01

## 2022-05-01 PROBLEM — K21.9 GERD (GASTROESOPHAGEAL REFLUX DISEASE): Status: ACTIVE | Noted: 2022-05-01

## 2022-05-01 PROBLEM — Z86.79: Status: ACTIVE | Noted: 2022-05-01

## 2022-05-01 LAB
ABSOLUTE EOS #: <0.03 K/UL (ref 0–0.44)
ABSOLUTE IMMATURE GRANULOCYTE: 0.03 K/UL (ref 0–0.3)
ABSOLUTE LYMPH #: 0.73 K/UL (ref 1.1–3.7)
ABSOLUTE MONO #: 0.14 K/UL (ref 0.1–1.2)
ALBUMIN SERPL-MCNC: 4.3 G/DL (ref 3.5–5.2)
ALBUMIN/GLOBULIN RATIO: 1 (ref 1–2.5)
ALP BLD-CCNC: 131 U/L (ref 35–104)
ALT SERPL-CCNC: 53 U/L (ref 5–33)
ANION GAP SERPL CALCULATED.3IONS-SCNC: 16 MMOL/L (ref 9–17)
AST SERPL-CCNC: 81 U/L
BASOPHILS # BLD: 0 % (ref 0–2)
BASOPHILS ABSOLUTE: <0.03 K/UL (ref 0–0.2)
BILIRUB SERPL-MCNC: 0.41 MG/DL (ref 0.3–1.2)
BILIRUBIN DIRECT: 0.17 MG/DL
BILIRUBIN, INDIRECT: 0.24 MG/DL (ref 0–1)
BUN BLDV-MCNC: 12 MG/DL (ref 8–23)
CALCIUM SERPL-MCNC: 9.5 MG/DL (ref 8.6–10.4)
CHLORIDE BLD-SCNC: 100 MMOL/L (ref 98–107)
CO2: 19 MMOL/L (ref 20–31)
CREAT SERPL-MCNC: 0.61 MG/DL (ref 0.5–0.9)
EOSINOPHILS RELATIVE PERCENT: 0 % (ref 1–4)
GFR AFRICAN AMERICAN: >60 ML/MIN
GFR NON-AFRICAN AMERICAN: >60 ML/MIN
GFR SERPL CREATININE-BSD FRML MDRD: ABNORMAL ML/MIN/{1.73_M2}
GLUCOSE BLD-MCNC: 86 MG/DL (ref 70–99)
HCT VFR BLD CALC: 40.2 % (ref 36.3–47.1)
HEMOGLOBIN: 13.4 G/DL (ref 11.9–15.1)
IMMATURE GRANULOCYTES: 1 %
LIPASE: 42 U/L (ref 13–60)
LYMPHOCYTES # BLD: 15 % (ref 24–43)
MCH RBC QN AUTO: 34 PG (ref 25.2–33.5)
MCHC RBC AUTO-ENTMCNC: 33.3 G/DL (ref 28.4–34.8)
MCV RBC AUTO: 102 FL (ref 82.6–102.9)
MONOCYTES # BLD: 3 % (ref 3–12)
NRBC AUTOMATED: 0 PER 100 WBC
PDW BLD-RTO: 12.6 % (ref 11.8–14.4)
PLATELET # BLD: 421 K/UL (ref 138–453)
PMV BLD AUTO: 9.6 FL (ref 8.1–13.5)
POTASSIUM SERPL-SCNC: 4.9 MMOL/L (ref 3.7–5.3)
PRO-BNP: 246 PG/ML
PROCALCITONIN: 0.05 NG/ML
RBC # BLD: 3.94 M/UL (ref 3.95–5.11)
REASON FOR REJECTION: NORMAL
SEG NEUTROPHILS: 82 % (ref 36–65)
SEGMENTED NEUTROPHILS ABSOLUTE COUNT: 4.1 K/UL (ref 1.5–8.1)
SODIUM BLD-SCNC: 135 MMOL/L (ref 135–144)
TOTAL PROTEIN: 8.7 G/DL (ref 6.4–8.3)
TROPONIN, HIGH SENSITIVITY: 10 NG/L (ref 0–14)
WBC # BLD: 5 K/UL (ref 3.5–11.3)
ZZ NTE CLEAN UP: ORDERED TEST: NORMAL
ZZ NTE WITH NAME CLEAN UP: SPECIMEN SOURCE: NORMAL

## 2022-05-01 PROCEDURE — 97535 SELF CARE MNGMENT TRAINING: CPT

## 2022-05-01 PROCEDURE — 82248 BILIRUBIN DIRECT: CPT

## 2022-05-01 PROCEDURE — 99285 EMERGENCY DEPT VISIT HI MDM: CPT

## 2022-05-01 PROCEDURE — 83690 ASSAY OF LIPASE: CPT

## 2022-05-01 PROCEDURE — 94640 AIRWAY INHALATION TREATMENT: CPT

## 2022-05-01 PROCEDURE — 84145 PROCALCITONIN (PCT): CPT

## 2022-05-01 PROCEDURE — 6370000000 HC RX 637 (ALT 250 FOR IP): Performed by: STUDENT IN AN ORGANIZED HEALTH CARE EDUCATION/TRAINING PROGRAM

## 2022-05-01 PROCEDURE — 83880 ASSAY OF NATRIURETIC PEPTIDE: CPT

## 2022-05-01 PROCEDURE — 80053 COMPREHEN METABOLIC PANEL: CPT

## 2022-05-01 PROCEDURE — 6360000002 HC RX W HCPCS: Performed by: STUDENT IN AN ORGANIZED HEALTH CARE EDUCATION/TRAINING PROGRAM

## 2022-05-01 PROCEDURE — 97530 THERAPEUTIC ACTIVITIES: CPT

## 2022-05-01 PROCEDURE — 71045 X-RAY EXAM CHEST 1 VIEW: CPT

## 2022-05-01 PROCEDURE — 94761 N-INVAS EAR/PLS OXIMETRY MLT: CPT

## 2022-05-01 PROCEDURE — 97166 OT EVAL MOD COMPLEX 45 MIN: CPT

## 2022-05-01 PROCEDURE — 1200000000 HC SEMI PRIVATE

## 2022-05-01 PROCEDURE — 85025 COMPLETE CBC W/AUTO DIFF WBC: CPT

## 2022-05-01 PROCEDURE — 36415 COLL VENOUS BLD VENIPUNCTURE: CPT

## 2022-05-01 PROCEDURE — 99223 1ST HOSP IP/OBS HIGH 75: CPT | Performed by: INTERNAL MEDICINE

## 2022-05-01 PROCEDURE — 94664 DEMO&/EVAL PT USE INHALER: CPT

## 2022-05-01 PROCEDURE — 93005 ELECTROCARDIOGRAM TRACING: CPT | Performed by: INTERNAL MEDICINE

## 2022-05-01 PROCEDURE — 84484 ASSAY OF TROPONIN QUANT: CPT

## 2022-05-01 PROCEDURE — 2700000000 HC OXYGEN THERAPY PER DAY

## 2022-05-01 PROCEDURE — 97161 PT EVAL LOW COMPLEX 20 MIN: CPT

## 2022-05-01 RX ORDER — ACETAMINOPHEN 325 MG/1
650 TABLET ORAL EVERY 6 HOURS PRN
Status: DISCONTINUED | OUTPATIENT
Start: 2022-05-01 | End: 2022-05-04 | Stop reason: HOSPADM

## 2022-05-01 RX ORDER — ENOXAPARIN SODIUM 100 MG/ML
30 INJECTION SUBCUTANEOUS DAILY
Status: DISCONTINUED | OUTPATIENT
Start: 2022-05-01 | End: 2022-05-04 | Stop reason: HOSPADM

## 2022-05-01 RX ORDER — GUAIFENESIN DEXTROMETHORPHAN HYDROBROMIDE ORAL SOLUTION 10; 100 MG/5ML; MG/5ML
10 SOLUTION ORAL EVERY 4 HOURS PRN
Status: DISCONTINUED | OUTPATIENT
Start: 2022-05-01 | End: 2022-05-04 | Stop reason: HOSPADM

## 2022-05-01 RX ORDER — ONDANSETRON 4 MG/1
4 TABLET, ORALLY DISINTEGRATING ORAL EVERY 8 HOURS PRN
Status: DISCONTINUED | OUTPATIENT
Start: 2022-05-01 | End: 2022-05-04 | Stop reason: HOSPADM

## 2022-05-01 RX ORDER — ONDANSETRON 4 MG/1
4 TABLET, ORALLY DISINTEGRATING ORAL ONCE
Status: COMPLETED | OUTPATIENT
Start: 2022-05-01 | End: 2022-05-01

## 2022-05-01 RX ORDER — SODIUM CHLORIDE 9 MG/ML
INJECTION, SOLUTION INTRAVENOUS PRN
Status: DISCONTINUED | OUTPATIENT
Start: 2022-05-01 | End: 2022-05-04 | Stop reason: HOSPADM

## 2022-05-01 RX ORDER — POLYETHYLENE GLYCOL 3350 17 G/17G
17 POWDER, FOR SOLUTION ORAL DAILY PRN
Status: DISCONTINUED | OUTPATIENT
Start: 2022-05-01 | End: 2022-05-04 | Stop reason: HOSPADM

## 2022-05-01 RX ORDER — ONDANSETRON 2 MG/ML
4 INJECTION INTRAMUSCULAR; INTRAVENOUS EVERY 6 HOURS PRN
Status: DISCONTINUED | OUTPATIENT
Start: 2022-05-01 | End: 2022-05-04 | Stop reason: HOSPADM

## 2022-05-01 RX ORDER — SODIUM CHLORIDE 0.9 % (FLUSH) 0.9 %
5-40 SYRINGE (ML) INJECTION EVERY 12 HOURS SCHEDULED
Status: DISCONTINUED | OUTPATIENT
Start: 2022-05-01 | End: 2022-05-04 | Stop reason: HOSPADM

## 2022-05-01 RX ORDER — AZITHROMYCIN 250 MG/1
500 TABLET, FILM COATED ORAL ONCE
Status: COMPLETED | OUTPATIENT
Start: 2022-05-01 | End: 2022-05-01

## 2022-05-01 RX ORDER — FAMOTIDINE 20 MG/1
20 TABLET, FILM COATED ORAL DAILY
Status: DISCONTINUED | OUTPATIENT
Start: 2022-05-01 | End: 2022-05-04 | Stop reason: HOSPADM

## 2022-05-01 RX ORDER — ATORVASTATIN CALCIUM 80 MG/1
40 TABLET, FILM COATED ORAL NIGHTLY
Status: DISCONTINUED | OUTPATIENT
Start: 2022-05-01 | End: 2022-05-04 | Stop reason: HOSPADM

## 2022-05-01 RX ORDER — AMITRIPTYLINE HYDROCHLORIDE 10 MG/1
10 TABLET, FILM COATED ORAL NIGHTLY
Status: DISCONTINUED | OUTPATIENT
Start: 2022-05-01 | End: 2022-05-04 | Stop reason: HOSPADM

## 2022-05-01 RX ORDER — OXYCODONE HYDROCHLORIDE 5 MG/1
5 TABLET ORAL ONCE
Status: DISCONTINUED | OUTPATIENT
Start: 2022-05-01 | End: 2022-05-01

## 2022-05-01 RX ORDER — ACETAMINOPHEN 650 MG/1
650 SUPPOSITORY RECTAL EVERY 6 HOURS PRN
Status: DISCONTINUED | OUTPATIENT
Start: 2022-05-01 | End: 2022-05-04 | Stop reason: HOSPADM

## 2022-05-01 RX ORDER — OXYCODONE HYDROCHLORIDE AND ACETAMINOPHEN 5; 325 MG/1; MG/1
1 TABLET ORAL ONCE
Status: COMPLETED | OUTPATIENT
Start: 2022-05-01 | End: 2022-05-01

## 2022-05-01 RX ORDER — PREDNISONE 20 MG/1
40 TABLET ORAL ONCE
Status: COMPLETED | OUTPATIENT
Start: 2022-05-01 | End: 2022-05-01

## 2022-05-01 RX ORDER — BUDESONIDE AND FORMOTEROL FUMARATE DIHYDRATE 80; 4.5 UG/1; UG/1
2 AEROSOL RESPIRATORY (INHALATION) 2 TIMES DAILY
Refills: 1 | Status: DISCONTINUED | OUTPATIENT
Start: 2022-05-01 | End: 2022-05-04 | Stop reason: HOSPADM

## 2022-05-01 RX ORDER — LOSARTAN POTASSIUM 50 MG/1
50 TABLET ORAL NIGHTLY
Status: DISCONTINUED | OUTPATIENT
Start: 2022-05-01 | End: 2022-05-04 | Stop reason: HOSPADM

## 2022-05-01 RX ORDER — OXYCODONE HYDROCHLORIDE AND ACETAMINOPHEN 5; 325 MG/1; MG/1
1 TABLET ORAL EVERY 6 HOURS PRN
Status: DISCONTINUED | OUTPATIENT
Start: 2022-05-01 | End: 2022-05-04 | Stop reason: HOSPADM

## 2022-05-01 RX ORDER — SODIUM CHLORIDE 0.9 % (FLUSH) 0.9 %
5-40 SYRINGE (ML) INJECTION PRN
Status: DISCONTINUED | OUTPATIENT
Start: 2022-05-01 | End: 2022-05-04 | Stop reason: HOSPADM

## 2022-05-01 RX ORDER — ACETAMINOPHEN 325 MG/1
650 TABLET ORAL ONCE
Status: COMPLETED | OUTPATIENT
Start: 2022-05-01 | End: 2022-05-01

## 2022-05-01 RX ORDER — CARVEDILOL 12.5 MG/1
12.5 TABLET ORAL 2 TIMES DAILY WITH MEALS
Status: DISCONTINUED | OUTPATIENT
Start: 2022-05-01 | End: 2022-05-04 | Stop reason: HOSPADM

## 2022-05-01 RX ADMIN — AMITRIPTYLINE HYDROCHLORIDE 10 MG: 10 TABLET, FILM COATED ORAL at 20:00

## 2022-05-01 RX ADMIN — ONDANSETRON 4 MG: 4 TABLET, ORALLY DISINTEGRATING ORAL at 09:16

## 2022-05-01 RX ADMIN — ACETAMINOPHEN 650 MG: 325 TABLET ORAL at 09:16

## 2022-05-01 RX ADMIN — PREDNISONE 40 MG: 20 TABLET ORAL at 09:16

## 2022-05-01 RX ADMIN — OXYCODONE HYDROCHLORIDE AND ACETAMINOPHEN 1 TABLET: 5; 325 TABLET ORAL at 10:28

## 2022-05-01 RX ADMIN — AZITHROMYCIN 500 MG: 250 TABLET, FILM COATED ORAL at 10:28

## 2022-05-01 RX ADMIN — OXYCODONE HYDROCHLORIDE AND ACETAMINOPHEN 1 TABLET: 5; 325 TABLET ORAL at 14:27

## 2022-05-01 RX ADMIN — IPRATROPIUM BROMIDE 0.5 MG: 0.5 SOLUTION RESPIRATORY (INHALATION) at 09:36

## 2022-05-01 RX ADMIN — ATORVASTATIN CALCIUM 40 MG: 80 TABLET, FILM COATED ORAL at 20:00

## 2022-05-01 RX ADMIN — LOSARTAN POTASSIUM 50 MG: 50 TABLET, FILM COATED ORAL at 20:00

## 2022-05-01 RX ADMIN — CARVEDILOL 12.5 MG: 12.5 TABLET, FILM COATED ORAL at 17:10

## 2022-05-01 RX ADMIN — OXYCODONE HYDROCHLORIDE AND ACETAMINOPHEN 1 TABLET: 5; 325 TABLET ORAL at 20:00

## 2022-05-01 RX ADMIN — ALBUTEROL SULFATE 5 MG: 5 SOLUTION RESPIRATORY (INHALATION) at 09:35

## 2022-05-01 ASSESSMENT — ENCOUNTER SYMPTOMS
STRIDOR: 0
DIARRHEA: 0
SINUS PAIN: 0
WHEEZING: 0
ABDOMINAL DISTENTION: 0
SHORTNESS OF BREATH: 1
EYE REDNESS: 0
EYE DISCHARGE: 0
BLOOD IN STOOL: 0
CONSTIPATION: 0
ABDOMINAL DISTENTION: 1
ABDOMINAL PAIN: 1
VOMITING: 0
EYE PAIN: 0
COUGH: 1
APNEA: 0
SINUS PRESSURE: 0
SORE THROAT: 0
RHINORRHEA: 0
WHEEZING: 1
NAUSEA: 0
EYE ITCHING: 0
NAUSEA: 1
CHOKING: 0

## 2022-05-01 ASSESSMENT — PAIN SCALES - GENERAL
PAINLEVEL_OUTOF10: 7
PAINLEVEL_OUTOF10: 10

## 2022-05-01 NOTE — PROGRESS NOTES
Physical Therapy  Facility/Department: [facility]  Physical Therapy Initial Assessment    Name: Tory Coronel  : 1955  MRN: [id number]  Date of Service: 2022  Chief Complaint   Patient presents with    Abdominal Pain       Discharge Recommendations:    Further therapy recommended at discharge. PT Equipment Recommendations  Equipment Needed: No (pt will require RW if unable to borrow/ locate from family)      Patient Diagnosis(es): There were no encounter diagnoses. Past Medical History:  has a past medical history of Appendicitis, Cerebral artery occlusion with cerebral infarction ([address] CHF (congestive heart failure) ([address] Chronic respiratory failure with hypoxia and hypercapnia (HCC), Chronic rhinitis, Cigarette smoker, COPD (chronic obstructive pulmonary disease) ([address] Depression, Dysphagia, Essential hypertension, GERD (gastroesophageal reflux disease), Heart failure, diastolic, with acute decompensation ([address] Hepatitis C, chronic ([address] History of smoking at least 1 pack per day for at least 30 years, Hyperlipidemia, Hypertension, Migraine, Moderate COPD (chronic obstructive pulmonary disease) ([address] Multiple transfusions, Neurogenic dysphagia, Osteoarthritis, Pneumonia, Scoliosis, and Substance abuse ([address] Past Surgical History:  has a past surgical history that includes  section; LEEP (); Upper gastrointestinal endoscopy (); Gastric bypass surgery; Appendectomy (2017); laparoscopic appendectomy (N/A, 3/27/2017); Upper gastrointestinal endoscopy (2018); Upper gastrointestinal endoscopy (N/A, 2018); pr colonoscopy w/biopsy single/multiple (N/A, 11/10/2018); pr egd transoral biopsy single/multiple (N/A, 2018); hip surgery (Right, 2019); Total hip arthroplasty (Right, 2019); Medication Injection (Left, 3/25/2021); Upper gastrointestinal endoscopy (N/A, 2021); other surgical history (Left, 2021);  Medication Injection (Left, 7/12/2021); and Medication Injection (Left, 7/12/2021). Assessment   Body Structures, Functions, Activity Limitations Requiring Skilled Therapeutic Intervention: Decreased functional mobility ; Decreased endurance;Decreased posture;Decreased high-level IADLs; Increased pain  Assessment: Pt ambulated 3ft with RW CGA, no LOB noted throughout session. Pt would benefit from continued skilled physical therapy to promote return to independent baseline upon discharge. Therapy Prognosis: Good  Decision Making: Medium Complexity  Requires PT Follow-Up: Yes  Activity Tolerance  Activity Tolerance: Patient tolerated treatment well     Plan   Plan  Plan:  (5x/week)  Current Treatment Recommendations: Strengthening,ROM,Balance training,Gait training,Stair training,Functional mobility training,Transfer training,Endurance training,Patient/Caregiver education & training  Safety Devices  Type of Devices: Call light within reach,Gait belt,Nurse notified,Left in bed  Restraints  Restraints Initially in Place: No     Restrictions  Restrictions/Precautions  Restrictions/Precautions: Up as Tolerated  Required Braces or Orthoses?: No     Subjective   General  Patient assessed for rehabilitation services?: Yes  Response To Previous Treatment: Not applicable  Family / Caregiver Present: No  Follows Commands: Within Functional Limits  General Comment  Comments: Pt notes diffuse abdominal pain. RN present to give pain medication.   Subjective  Subjective: RN and pt in agreement for PT eval; pt supine in bed upon PT arrival, pt required encouragement to participate; pt cooperative with encouragement         Social/Functional History  Social/Functional History  Type of Home: Facility  Home Equipment: Rollator (pt reports borrowing sister's rollator)  ADL Assistance: Independent  Ambulation Assistance: Independent  Additional Comments: Pt recently admitted to SNF with therapy services, recently returned to sister's home.  Vision/Hearing  Hearing: Within functional limits    Cognition   Cognition  Overall Cognitive Status: Exceptions  Following Commands: Follows multistep commands with increased time  Safety Judgement: Decreased awareness of need for assistance;Decreased awareness of need for safety  Insights: Decreased awareness of deficits     Objective      Gross Assessment  Sensation: Intact     Joint Mobility  ROM RLE: Appears WFL based on observed functional mobility  ROM LLE: Appears WFL based on observed functional mobility  ROM RUE: Appears WFL based on observed functional mobility  ROM LUE: Appears WFL based on observed functional mobility  Strength RLE  Strength RLE: WFL  Comment: Appears WFL based on observed functional mobility  Strength LLE  Strength LLE: WFL  Comment: Appears WFL based on observed functional mobility  Strength RUE  Strength RUE: WFL  Comment: Appears WFL based on observed functional mobility  Strength LUE  Strength LUE: WFL  Comment: Appears WFL based on observed functional mobility           Bed mobility  Supine to Sit: Contact guard assistance  Sit to Supine: Contact guard assistance  Comment: increased time required to complete  Transfers  Sit to Stand: Contact guard assistance  Stand to sit: Contact guard assistance  Ambulation  Surface: level tile  Device: Rolling Walker  Assistance: Contact guard assistance  Quality of Gait: Increased high reliance on RW; forward flexed posture  Gait Deviations: Slow Geovanna  Distance: 3ft to [facility]  Comments: Ambulation distance limited due to patient's preference to return to supine to finish lunch.   More Ambulation?: No  Stairs/Curb  Stairs?: No     Balance  Posture: Fair  Sitting - Static: Fair;+  Sitting - Dynamic: Fair;-  Standing - Static: Fair;-  Standing - Dynamic: Fair;-  Comments: standing balance assessed w/ RW; pt able to sit EOB CGA    AM-PAC Score     AM-PAC Inpatient Mobility without Stair Climbing Raw Score : 19 (05/01/22 1501)  AM-PAC Inpatient without Stair Climbing T-Scale Score : 56.04 (05/01/22 1501)  Mobility Inpatient CMS 0-100% Score: 12.25 (05/01/22 1501)  Mobility Inpatient without Stair CMS G-Code Modifier : CI (05/01/22 1501)       Goals  Short Term Goals  Time Frame for Short term goals: 14  Short term goal 1: Pt to perform bed mobility independently  Short term goal 2: Demonstrate functional transfers independently  Short term goal 3: Pt to ambulate 150ft w/ RW independently  Short term goal 4: Tolerate 30 minutes of therapy to demo increased endurance  Patient Goals   Patient goals : Did not state       Therapy Time   Individual Concurrent Group Co-treatment   Time In 1423         Time Out 1440         Minutes 17         Timed Code Treatment Minutes: 8 Minutes       Estrella Mena, PT

## 2022-05-01 NOTE — PROGRESS NOTES
I signed up for this patient in error as another resident had already started managing their care. I did not evaluate this patient.     Lew Pal, DO  Emergency Medicine Resident

## 2022-05-01 NOTE — ED PROVIDER NOTES
[address]  Emergency Department Encounter  Emergency Medicine Resident     Pt Name: Kp Tiwari  MRN: [id number]  Jean Claudetrongfsurinder 1955  Date of evaluation: 5/1/22  PCP:  Robby Ziegler MD    CHIEF COMPLAINT       Chief Complaint   Patient presents with    Abdominal Pain       HISTORY OF PRESENT ILLNESS  (Location/Symptom, Timing/Onset, Context/Setting, Quality, Duration, Modifying Factors, Severity.)    Emi Daniels is a 69 y.o. female with h/o CVA, chronic pain, COPD, pancreatic mass (without any treatment x 1 year), substance abuse,  who presents with epigastric pain and chest pain. Chest pain began today, and epigastric pain is chronic. Patient also with productive cough with white sputum, denies any fevers. Patient cannot recall how long she's had cough for. She was seen in the ED on 4/30 for similar pain and had a full work up including CT abd which confirmed pancreatic mass w/o obstruction. Labs done included lipase, cmp, and cbc. Lipase and cbc wnl, CMP with elevated liver enzymes (ALP, AST, ALP). Patient was tolerating po and sent home. She has history of a pancreatic mass that is being treated by Dr. Dakota Pichardo at [facility]. Patient denies any pain medications at home, however home medications reviewed and patient previously Rx percocet. Patient previously resided at [address] [facility], but she states that she left the facility 2 days ago after being transferred to [facility] ED for TIA. Patient was seen yesterday at [facility] ED with similar complaints (see above) and discharged home with taxi to sister's house. Patient endorses that she has not taken any treatment for pancreatic mass due to sister throwing medications away. Patient brought in by ambulance today. Positive SOB that began today. On arrival patient placed on 1.5L NC with o2 sats in mid 90's. Positive nausea, but denies emesis.  Denies any numbness, tingling, jaw pain, headache, abdominal pain, constipation, diarrhea, any recent URIs. Of note, patient was recently admitted from - for CVA. PAST MEDICAL / SURGICAL / SOCIAL / FAMILY HISTORY    has a past medical history of Appendicitis, Cerebral artery occlusion with cerebral infarction ([address] CHF (congestive heart failure) (HCC), Chronic respiratory failure with hypoxia and hypercapnia (HCC), Chronic rhinitis, Cigarette smoker, COPD (chronic obstructive pulmonary disease) ([address] Depression, Dysphagia, Essential hypertension, GERD (gastroesophageal reflux disease), Heart failure, diastolic, with acute decompensation ([address] Hepatitis C, chronic ([address] History of smoking at least 1 pack per day for at least 30 years, Hyperlipidemia, Hypertension, Migraine, Moderate COPD (chronic obstructive pulmonary disease) ([address] Multiple transfusions, Neurogenic dysphagia, Osteoarthritis, Pneumonia, Scoliosis, and Substance abuse ([address] has a past surgical history that includes  section; LEEP (); Upper gastrointestinal endoscopy (); Gastric bypass surgery; Appendectomy (2017); laparoscopic appendectomy (N/A, 3/27/2017); Upper gastrointestinal endoscopy (2018); Upper gastrointestinal endoscopy (N/A, 2018); pr colonoscopy w/biopsy single/multiple (N/A, 11/10/2018); pr egd transoral biopsy single/multiple (N/A, 2018); hip surgery (Right, 2019); Total hip arthroplasty (Right, 2019); Medication Injection (Left, 3/25/2021); Upper gastrointestinal endoscopy (N/A, 2021); other surgical history (Left, 2021); Medication Injection (Left, 2021); and Medication Injection (Left, 2021).     Social History     Socioeconomic History    Marital status: Single     Spouse name: Not on file    Number of children: Not on file    Years of education: Not on file    Highest education level: Not on file   Occupational History     Employer: DISABLED   Tobacco Use    Smoking status: Current Some Day Smoker     Packs/day: 0.50     Years: 40.00     Pack years: 20.00     Types: Cigarettes     Start date: 5     Last attempt to quit: 11/2018     Years since quitting: 3.4    Smokeless tobacco: Never Used   Vaping Use    Vaping Use: Never used   Substance and Sexual Activity    Alcohol use: No     Alcohol/week: 0.0 standard drinks    Drug use: No     Types: IV, Cocaine     Comment: past history    Sexual activity: Not on file   Other Topics Concern    Not on file   Social History Narrative    Not on file     Social Determinants of Health     Financial Resource Strain:     Difficulty of Paying Living Expenses: Not on file   Food Insecurity:     Worried About Running Out of Food in the Last Year: Not on file    Toby of Food in the Last Year: Not on file   Transportation Needs:     Lack of Transportation (Medical): Not on file    Lack of Transportation (Non-Medical):  Not on file   Physical Activity:     Days of Exercise per Week: Not on file    Minutes of Exercise per Session: Not on file   Stress:     Feeling of Stress : Not on file   Social Connections:     Frequency of Communication with Friends and Family: Not on file    Frequency of Social Gatherings with Friends and Family: Not on file    Attends Denominational Services: Not on file    Active Member of [address] [place] [address] [employer] or Organizations: Not on file    Attends Club or Organization Meetings: Not on file    Marital Status: Not on file   Intimate Partner Violence:     Fear of Current or Ex-Partner: Not on file    Emotionally Abused: Not on file    Physically Abused: Not on file    Sexually Abused: Not on file   Housing Stability:     Unable to Pay for Housing in the Last Year: Not on file    Number of [place] in the Last Year: Not on file    Unstable Housing in the Last Year: Not on file       Family History   Problem Relation Age of Onset    Breast Cancer Mother     Heart Disease Father        Allergies:    Aspirin and Keflet [cephalexin]    Home Medications:  Prior to Admission medications    Medication Sig Start Date End Date Taking? Authorizing Provider   carvedilol (COREG) 12.5 MG tablet Take 1 tablet by mouth 2 times daily (with meals) 4/24/22 5/24/22  Joshua Whiteside MD   gabapentin (NEURONTIN) 600 MG tablet Take 0.5 tablets by mouth 3 times daily for 7 days. 4/24/22 5/1/22  Joshua Whiteside MD   mirtazapine (REMERON) 30 MG tablet Take 1 tablet by mouth nightly for 7 days 4/24/22 5/1/22  Joshua Whiteside MD   dextromethorphan-guaiFENesin (ROBITUSSIN-DM)  MG/5ML syrup Take 10 mLs by mouth every 4 hours as needed for Cough 4/24/22   Joshua Whiteside MD   losartan (COZAAR) 50 MG tablet Take 1 tablet by mouth at bedtime 4/24/22 5/24/22  Joshua Whiteside MD   oxyCODONE-acetaminophen (PERCOCET) 5-325 MG per tablet Take 1 tablet by mouth every 6 hours as needed for Pain for up to 7 days.  4/24/22 5/1/22  Joshua Whiteside MD   pantoprazole (PROTONIX) 40 MG tablet Take 1 tablet by mouth every morning (before breakfast) 4/24/22   Joshua Whiteside MD   ondansetron (ZOFRAN-ODT) 8 MG TBDP disintegrating tablet  9/23/21   Historical Provider, MD   famotidine (PEPCID) 20 MG tablet  8/24/21   Historical Provider, MD   butalbital-acetaminophen-caffeine (FIORICET, ESGIC) -40 MG per tablet Take 1 tablet by mouth every 12 hours for 14 days 5/13/21 9/27/21  ISIDORO Hester MD   ferrous sulfate (FE TABS 325) 325 (65 Fe) MG EC tablet Take 1 tablet by mouth daily 5/14/21   ISIDORO Hester MD   acetaminophen (TYLENOL) 500 MG tablet Take 2 tablets by mouth every 6 hours as needed for Pain 5/7/21   Tara Leung DO   mometasone-formoterol (DULERA) 100-5 MCG/ACT inhaler Inhale 2 puffs into the lungs 2 times daily 4/6/21   Spike Velazquez, DO   atorvastatin (LIPITOR) 40 MG tablet Take 1 tablet by mouth nightly 4/6/21   Spike Velazquez, DO   amitriptyline (ELAVIL) 10 MG tablet Take 1 tablet by mouth nightly 4/6/21   Spike Velazquez, DO   albuterol sulfate HFA (PROVENTIL HFA) 108 (90 Base) MCG/ACT inhaler Inhale 1-2 puffs into the lungs once for 1 dose 4/6/21 9/27/21  Rene Standard, DO   amLODIPine (NORVASC) 10 MG tablet Take 1 tablet by mouth daily 2/9/21 2/9/21  Maru Graves MD   [facility]. Devices [facility]) MISC 1 Device by Does not apply route once for 1 dose 8/5/19 6/6/21  Dasia Avery PA-C   ipratropium-albuterol (DUONEB) 0.5-2.5 (3) MG/3ML SOLN nebulizer solution Inhale 3 mLs into the lungs every 4 hours as needed for Shortness of Breath 1/11/19   Sarah Markham MD   vitamin B-1 100 MG tablet Take 1 tablet by mouth daily 10/13/18   Sheeba Lopez MD   vitamin B-12 250 MCG tablet Take 1 tablet by mouth daily 10/13/18   Sheeba Lopez MD   Multiple Vitamins-Minerals (THERAPEUTIC MULTIVITAMIN-MINERALS) tablet Take 1 tablet by mouth daily 8/12/18   Mariusz Snider MD       REVIEW OF SYSTEMS    (2-9 systems for level 4, 10 or more for level 5)    Review of Systems   Constitutional: Negative for appetite change, chills, fatigue and fever. HENT: Negative for congestion, ear discharge, ear pain, postnasal drip, rhinorrhea, sinus pressure, sinus pain and sore throat. Eyes: Negative for pain, discharge, redness and itching. Respiratory: Positive for cough and shortness of breath. Negative for wheezing. Productive cough with white sputum     Cardiovascular: Positive for chest pain. Negative for palpitations and leg swelling. Gastrointestinal: Positive for abdominal distention, abdominal pain and nausea. Negative for constipation, diarrhea and vomiting. Genitourinary: Negative for decreased urine volume, dysuria and hematuria. Musculoskeletal: Negative for arthralgias, joint swelling, neck pain and neck stiffness. Skin: Negative for pallor and rash. Neurological: Negative for dizziness, seizures, facial asymmetry, weakness, light-headedness, numbness and headaches. Hematological: Negative for adenopathy. Psychiatric/Behavioral: Negative for agitation. PHYSICAL EXAM   (up to 7 for level 4, 8 or more for level 5)    INITIAL VITALS:   ED Triage Vitals   BP Temp Temp src Pulse Resp SpO2 Height Weight   05/01/22 0837 05/01/22 0837 -- 05/01/22 0837 05/01/22 0837 05/01/22 0837 05/01/22 0928 05/01/22 0928   (!) 161/99 98.3 °F (36.8 °C)  93 23 93 % 4' 11\" (1.499 m) 100 lb (45.4 kg)       Physical Exam  Vitals and nursing note reviewed. Constitutional:       Appearance: She is well-developed. HENT:      Head: Normocephalic and atraumatic. Mouth/Throat:      Mouth: Mucous membranes are moist.   Eyes:      Extraocular Movements: Extraocular movements intact. Pupils: Pupils are equal, round, and reactive to light. Cardiovascular:      Rate and Rhythm: Normal rate and regular rhythm. Heart sounds: Normal heart sounds. Pulmonary:      Effort: Respiratory distress present. Breath sounds: Rhonchi present. No wheezing. Comments: Diffuse rhonchi  On 1.5L NC upon arrival  Abdominal:      General: There is distension. Palpations: Abdomen is soft. Tenderness: There is abdominal tenderness in the epigastric area and periumbilical area. Skin:     General: Skin is warm. Capillary Refill: Capillary refill takes less than 2 seconds. Neurological:      General: No focal deficit present. Mental Status: She is alert and oriented to person, place, and time. Psychiatric:         Mood and Affect: Mood normal.         DIFFERENTIAL  DIAGNOSIS   PLAN (LABS / IMAGING / EKG):  Orders Placed This Encounter   Procedures    Culture, Respiratory    XR CHEST PORTABLE    Troponin    Brain Natriuretic Peptide    Lipase    Comprehensive Metabolic Panel with Bilirubin    SPECIMEN REJECTION    CBC with Auto Differential    PREVIOUS SPECIMEN    Basic Metabolic Panel w/ Reflex to MG    CBC with Auto Differential    Procalcitonin    ADULT DIET;  Regular    Misc nursing order (specify)    Vital signs per unit routine    Up as tolerated  Place intermittent pneumatic compression device    Full Code    Inpatient consult to Internal Medicine    Inpatient consult to Case Management    OT eval and treat    PT eval and treat    Initiate ED RT Aerosol protocol    Pulse oximetry, continuous    Initiate Oxygen Therapy Protocol    Nasal Cannula Oxygen    Initiate Oxygen Therapy Protocol    ADMIT TO INPATIENT    ADMIT TO INPATIENT       MEDICATIONS ORDERED:  Orders Placed This Encounter   Medications    acetaminophen (TYLENOL) tablet 650 mg    DISCONTD: oxyCODONE (ROXICODONE) immediate release tablet 5 mg    ondansetron (ZOFRAN-ODT) disintegrating tablet 4 mg    DISCONTD: albuterol (PROVENTIL) nebulizer solution 2.5 mg     Order Specific Question:   Initiate RT Bronchodilator Protocol     Answer: Yes    predniSONE (DELTASONE) tablet 40 mg    DISCONTD: azithromycin (ZITHROMAX) 500 mg in dextrose 5% 250 mL IVPB     Order Specific Question:   Antimicrobial Indications     Answer:   COPD Exacerbation    albuterol (PROVENTIL) nebulizer solution 5 mg     Order Specific Question:   Initiate RT Bronchodilator Protocol     Answer: Yes    ipratropium (ATROVENT) 0.02 % nebulizer solution 0.5 mg     Order Specific Question:   Initiate RT Bronchodilator Protocol     Answer:    Yes    oxyCODONE-acetaminophen (PERCOCET) 5-325 MG per tablet 1 tablet    azithromycin (ZITHROMAX) tablet 500 mg     Order Specific Question:   Antimicrobial Indications     Answer:   COPD Exacerbation    sodium chloride flush 0.9 % injection 5-40 mL    sodium chloride flush 0.9 % injection 5-40 mL    0.9 % sodium chloride infusion    enoxaparin Sodium (LOVENOX) injection 30 mg     Order Specific Question:   Indication of Use     Answer:   Prophylaxis-DVT/PE    OR Linked Order Group     ondansetron (ZOFRAN-ODT) disintegrating tablet 4 mg     ondansetron (ZOFRAN) injection 4 mg    polyethylene glycol (GLYCOLAX) packet 17 g    OR Linked Order Group    [facility] acetaminophen (TYLENOL) tablet 650 mg     acetaminophen (TYLENOL) suppository 650 mg    cefTRIAXone (ROCEPHIN) 1000 mg IVPB in NS 50ml minibag     Order Specific Question:   Antimicrobial Indications     Answer:   COPD Exacerbation     Order Specific Question:   COPD exacerbation duration of therapy     Answer:   5 days    azithromycin (ZITHROMAX) 500 mg in dextrose 5% 250 mL IVPB     Order Specific Question:   Antimicrobial Indications     Answer:   COPD Exacerbation     Order Specific Question:   COPD exacerbation duration of therapy     Answer:   5 days    amitriptyline (ELAVIL) tablet 10 mg    atorvastatin (LIPITOR) tablet 40 mg    carvedilol (COREG) tablet 12.5 mg    dextromethorphan-guaiFENesin (ROBITUSSIN-DM)  MG/5ML liquid 10 mL    famotidine (PEPCID) tablet 20 mg    losartan (COZAAR) tablet 50 mg    budesonide-formoterol (SYMBICORT) 80-4.5 MCG/ACT inhaler 2 puff     Refill:  1    oxyCODONE-acetaminophen (PERCOCET) 5-325 MG per tablet 1 tablet         DIAGNOSTIC RESULTS / EMERGENCYDEPARTMENT COURSE / MDM   LABS:  Labs Reviewed   COMPREHENSIVE METABOLIC PANEL WITH BILIRUBIN - Abnormal; Notable for the following components:       Result Value    Alkaline Phosphatase 131 (*)     ALT 53 (*)     AST 81 (*)     Total Protein 8.7 (*)     CO2 19 (*)     All other components within normal limits   CBC WITH AUTO DIFFERENTIAL - Abnormal; Notable for the following components:    RBC 3.94 (*)     MCH 34.0 (*)     Seg Neutrophils 82 (*)     Lymphocytes 15 (*)     Eosinophils % 0 (*)     Immature Granulocytes 1 (*)     Absolute Lymph # 0.73 (*)     All other components within normal limits   CULTURE, RESPIRATORY   TROPONIN   BRAIN NATRIURETIC PEPTIDE   LIPASE   SPECIMEN REJECTION   PROCALCITONIN   PREVIOUS SPECIMEN       RADIOLOGY:  CT ABDOMEN PELVIS W IV CONTRAST Additional Contrast? None    Result Date: 4/30/2022  EXAMINATION: CT OF THE ABDOMEN AND PELVIS WITH CONTRAST 4/30/2022 5:23 pm TECHNIQUE: CT of the abdomen and pelvis was performed with the administration of intravenous contrast. Multiplanar reformatted images are provided for review. Dose modulation, iterative reconstruction, and/or weight based adjustment of the mA/kV was utilized to reduce the radiation dose to as low as reasonably achievable. COMPARISON: January 3rd 2022 January 3, 2022 CT abdomen and pelvis HISTORY: ORDERING SYSTEM PROVIDED HISTORY: upper abd pain, h/o pancreatic mass TECHNOLOGIST PROVIDED HISTORY: upper abd pain, h/o pancreatic mass Decision Support Exception - unselect if not a suspected or confirmed emergency medical condition->Emergency Medical Condition (MA) Reason for Exam: upper abdominal pain, h/o pancreatic mass FINDINGS: Lower Chest: Calcific coronary artery disease. Bibasilar subsegmental atelectasis. Organs: Fat containing umbilical hernia. Stable heterogeneous mass in the body of the pancreas measures 17 x 24 mm. The liver, spleen,, and adrenals appear normal.  Gallbladder normal. Kidneys appear normal. The bladder appears normal. GI/Bowel: The stomach,small bowel, and colon appear normal. Status post appendectomy with residual appendiceal stump noted. Pelvis: Uterus normal. Peritoneum/Retroperitoneum: The abdominal aorta and iliac arteries are normal in caliber. There is no pathologic adenopathy. Bones/Soft Tissues: Total hip arthroplasty on the right. Severe degenerative changes left hip with cystic changes in coxa plana. Moderate dextroconvex scoliosis. Stable pancreatic mass. No evidence of metastatic disease. Severe degenerative changes left hip. XR CHEST PORTABLE    Result Date: 5/1/2022  EXAMINATION: ONE XRAY VIEW OF THE CHEST 5/1/2022 9:18 am COMPARISON: 04/29/2022 HISTORY: ORDERING SYSTEM PROVIDED HISTORY: chest pain TECHNOLOGIST PROVIDED HISTORY: chest pain FINDINGS: Heart size stable. Increased bilateral linear pulmonary opacities. No pneumothorax. No focal pulmonary consolidation.   No pleural effusion. Increased bilateral linear pulmonary opacities could represent diffuse pulmonary edema or infection       EKG    EKG Interpretation    Interpreted by emergency department physician attending at arrival without any acute changes. All EKG's are interpreted by the Emergency Department Physician whoeither signs or Co-signs this chart in the absence of a cardiologist.    Impression:  69year old female with past medical history of CVA, chronic pain, COPD, pancreatic mass (without any treatment x 1 year), substance abuse who presents with epigastric pain, chest pain, and productive cough. Patient known to have pancreatic mass with recent CT abd positive for mass (done 4/30). Pain likely related to mass. Lipase, cmp, cbc ordered. Chest pain is new onset and will get troponin, ekg, chest x-ray. Patient has a history of COPD with productive cough and sob requiring 1.5L NC. Will treat as COPD exacerbation and order albuterol, prednisone, atrovent. EMERGENCY DEPARTMENT COURSE:  ED Course as of 05/01/22 1531   Sun May 01, 2022   0930 Spoke to patient's two sisters, one being Gigi Upton who called EMS this AM. States patient was previously staying at [facility] [address] When asked if patient would be able to go home to sister, sister declined and would not like her to go back to her house. States she was not aware that she was going to be dropped off at her house. Requesting that ED admit her to psych facility in [place] for cocaine use. Advised sister that patient is currently medically ill and does not meet criteria for psych transfer. Continued to voice concerns about drug abuse. Patient cough improved s/p treatment with neb tx. S/p prednisone. [TH]   1100 Discussed with SW, well aware of patient's history. States that if patient would like to be sent back to [facility], that they would need to get  involved, PT/OT, and prior auths. [TH]   1133 Patient resting comfortably.   States percocet relieved some pain, but still has mid epigastric pain. Unable to obtain IV line after multiple attempts by nurses and with Renu Anderson guidance. Patient updated on chest x-ray results and labs. Patient ok for admission, and would like to go back to [facility]. Call placed for admission. [TH]   1200 IM team at bedside. [TH]   1217 Patient to be admitted under IM.  [TH]   1234  states patient does not qualify for SNF per [place], and Kate Brunner refuses to take patient back to facility. Patient has a history of leaving [place] multiple times in the past. Discussed possibility of hospice. Patient refuses and would like to start treatment for pancreatic mass and pain management.  will call and attempt placement. [TH]      ED Course User Index  [TH] Sara Huang MD       [facility]    PROCEDURES:  N/A    CONSULTS:  IP CONSULT TO INTERNAL MEDICINE  IP CONSULT TO CASE MANAGEMENT    CRITICAL CARE:  Please see attending note    FINAL IMPRESSION   Pneumonia  COPD exacerbation  Intractable abdominal pain  Pancreatic Mass     DISPOSITION / PLAN   DISPOSITION Admitted 05/01/2022 12:20:10 PM  Plan to admit to internal medicine    Evaluation and treatment course in the ED, and plan of care upon discharge was discussed in length with the patient. Patient had no further questions prior to being discharged and was instructed to return to the ED for new or worsening symptoms. Any changes to existing medications or new prescriptions were reviewed with patient and they expressed understanding of how to correctly take their medications and the possible side effects. PATIENT REFERRED TO:  No follow-up provider specified.     DISCHARGE MEDICATIONS:  Current Discharge Medication List          Sara Hunag MD  Emergency Medicine Resident Physician, PGY-2    (Please note that portions of this note were completed with a voice recognition program.  Efforts were made to edit the dictations but occasionally words are mis-transcribed.)       Paradise Martinez MD  Resident  05/01/22 [id number]

## 2022-05-01 NOTE — ED NOTES
Patient was seen yesterday for same complaints. Patient arrived via [facility] with complaints of abdomen pain. Patient states that she has pancreatic cancer and doesn't see a oncologist. Patient states that her sister called 911 due to her crying pain. Patient has a productive cough.       Isa Bobo RN  05/01/22 [id number]

## 2022-05-01 NOTE — H&P
[address]     Department of Internal Medicine - Staff Internal Medicine Teaching Service          ADMISSION NOTE/HISTORY AND PHYSICAL EXAMINATION   Date: 5/1/2022  Patient Name: Juana Mobley  Date of admission: 5/1/2022  8:33 AM  YOB: 1955  PCP: Gemma Story MD  History Obtained From:  patient, electronic medical record    CHIEF COMPLAINT     Chief complaint: SOB     HISTORY OF PRESENTING ILLNESS     The patient is a pleasant 69 y.o. female pmh CHF, COPD, HTN, HLD, CVA   presents with a chief complaint of chest pain, SOB and abdominal pain. Of note she came to the ED yesterday with similar sx and was sent home. She states the for the past week she has been having productive cough and difficulty breathing. Has been coughing up yellow sputum with coughing. Has a hx of COPD and states that she has been taking her inhalers. Denies any fevers/chills. Abdominal pain is chronic from a pancreatic mass that is stable that was confirmed with imaging yesterday. In the ED patient was placed on 1.5L of O2 and given 40 prednisone. CXR revealed mild bilateral pulmonary opacities. Lipase is wnl. On exam patient's lung sounds ronchi. Patient is current smoker, no alcohol, former illegal drug user. Patient was a resident in a facility but because she left [place] recently, she was denied readmission. Went to her sister's house but was told to not come back. Vitals:    05/01/22 1100   BP: (!) 174/90   Pulse: 85   Resp: 22   Temp:    SpO2: 93%       On my evaluation,  Vitals:    05/01/22 1100   BP: (!) 174/90   Pulse: 85   Resp: 22   Temp:    SpO2: 93%         Review of Systems:  Review of Systems   Constitutional: Negative for chills, diaphoresis, fatigue and fever. Respiratory: Positive for cough, shortness of breath and wheezing. Negative for apnea, choking and stridor. Cardiovascular: Negative for chest pain and leg swelling.    Gastrointestinal: Positive for abdominal pain. Negative for abdominal distention, blood in stool, constipation, diarrhea, nausea and vomiting. Genitourinary: Negative for decreased urine volume, dysuria and urgency. Skin: Negative for pallor and wound. Neurological: Negative for syncope, weakness, numbness and headaches. Psychiatric/Behavioral: Negative for agitation, behavioral problems, confusion, decreased concentration and self-injury.          PAST MEDICAL HISTORY     Past Medical History:   Diagnosis Date    Appendicitis 3/27/2017    Cerebral artery occlusion with cerebral infarction ([address]     CHF (congestive heart failure) (HCC)     Chronic respiratory failure with hypoxia and hypercapnia (HCC)     Chronic rhinitis     Cigarette smoker     COPD (chronic obstructive pulmonary disease) ([address] 2018    Depression     Dysphagia     Essential hypertension 2018    GERD (gastroesophageal reflux disease)     Heart failure, diastolic, with acute decompensation ([address] 2018    Hepatitis C, chronic (HCC)     History of smoking at least 1 pack per day for at least 30 years     Hyperlipidemia     Hypertension     Migraine     Moderate COPD (chronic obstructive pulmonary disease) (HCC)     Multiple transfusions     Neurogenic dysphagia     Osteoarthritis     hands    Pneumonia     Scoliosis     Substance abuse ([address]     ivda and cocaine abuse in past       PAST SURGICAL HISTORY     Past Surgical History:   Procedure Laterality Date    APPENDECTOMY  2017     SECTION      GASTRIC BYPASS SURGERY      HIP SURGERY Right 2019    HIP TOTAL ARTHROPLASTY ANTERIOR APPROACH - MEDACTA,  C-ARM, (Right )    LAPAROSCOPIC APPENDECTOMY N/A 3/27/2017    APPENDECTOMY LAPAROSCOPIC performed by Stephie Nagy MD at [address] [place] [address] Drive [facility]      hgsil    MEDICATION INJECTION Left 3/25/2021    STEROID INJECTION LEFT HIP  WITH FLUORO performed by Vannesa Pearce MD at [address] [place] [address] 7/12/2021    INJECTION STEROID LEFT HIP WITH FLUORO- ATTEMPTED performed by Wolf Chacko MD at [address] Left 7/12/2021    INJECTION STEROID LEFT HIP performed by Wolf Chacko MD at [address] Left 07/12/2021    hip injection    [place] COLONOSCOPY W/BIOPSY SINGLE/MULTIPLE N/A 11/10/2018    COLONOSCOPY WITH BIOPSY performed by Shazia Sexton MD at [facility]    [place] EGD TRANSORAL BIOPSY SINGLE/MULTIPLE N/A 11/9/2018    EGD ESOPHAGOGASTRODUODENOSCOPY performed by Emerita Sarmiento MD at [address] [facility] Right 8/13/2019    HIP TOTAL ARTHROPLASTY ANTERIOR APPROACH - [facility],  C-ARM, performed by Misael Art DO at [place] [address]  2008    oesophagitis, candidiasis    UPPER GASTROINTESTINAL ENDOSCOPY  11/05/2018    UPPER GASTROINTESTINAL ENDOSCOPY N/A 11/5/2018    EGD CONTROL HEMORRHAGE performed by Emerita Sarmiento MD at [place] [address] 4/5/2021    EGD W/EUS FNA performed by Ilya Hubbard MD at [place] [address]     Aspirin and Keflet [cephalexin]    MEDICATIONS PRIOR TO ADMISSION     Prior to Admission medications    Medication Sig Start Date End Date Taking? Authorizing Provider   carvedilol (COREG) 12.5 MG tablet Take 1 tablet by mouth 2 times daily (with meals) 4/24/22 5/24/22  Esau Santana MD   gabapentin (NEURONTIN) 600 MG tablet Take 0.5 tablets by mouth 3 times daily for 7 days.  4/24/22 5/1/22  Esau Santana MD   mirtazapine (REMERON) 30 MG tablet Take 1 tablet by mouth nightly for 7 days 4/24/22 5/1/22  Esau Santana MD   dextromethorphan-guaiFENesin (ROBITUSSIN-DM)  MG/5ML syrup Take 10 mLs by mouth every 4 hours as needed for Cough 4/24/22   Esau Santana MD   losartan (COZAAR) 50 MG tablet Take 1 tablet by mouth at bedtime 4/24/22 5/24/22  Esau Santana MD   oxyCODONE-acetaminophen (PERCOCET) 5-325 MG per tablet Take 1 tablet by mouth every 6 hours as needed for Pain for up to 7 days. 4/24/22 5/1/22  Kassy Granda MD   pantoprazole (PROTONIX) 40 MG tablet Take 1 tablet by mouth every morning (before breakfast) 4/24/22   Kassy Granda MD   ondansetron (ZOFRAN-ODT) 8 MG TBDP disintegrating tablet  9/23/21   Historical Provider, MD   famotidine (PEPCID) 20 MG tablet  8/24/21   Historical Provider, MD   butalbital-acetaminophen-caffeine (FIORICET, ESGIC) -40 MG per tablet Take 1 tablet by mouth every 12 hours for 14 days 5/13/21 9/27/21  ISIDORO Cortez MD   ferrous sulfate (FE TABS 325) 325 (65 Fe) MG EC tablet Take 1 tablet by mouth daily 5/14/21   ISIDORO Cortez MD   acetaminophen (TYLENOL) 500 MG tablet Take 2 tablets by mouth every 6 hours as needed for Pain 5/7/21   Tara Leung,    mometasone-formoterol (DULERA) 100-5 MCG/ACT inhaler Inhale 2 puffs into the lungs 2 times daily 4/6/21   Rosibel Velazquez, DO   atorvastatin (LIPITOR) 40 MG tablet Take 1 tablet by mouth nightly 4/6/21   Rosibel Velazquez, DO   amitriptyline (ELAVIL) 10 MG tablet Take 1 tablet by mouth nightly 4/6/21   Rosibel Velazquez, DO   albuterol sulfate HFA (PROVENTIL HFA) 108 (90 Base) MCG/ACT inhaler Inhale 1-2 puffs into the lungs once for 1 dose 4/6/21 9/27/21  Rosibel Velazquez, DO   amLODIPine (NORVASC) 10 MG tablet Take 1 tablet by mouth daily 2/9/21 2/9/21  Luis Mishra MD   [facility].  Devices [facility]) MISC 1 Device by Does not apply route once for 1 dose 8/5/19 6/6/21  Elpidio España PA-C   ipratropium-albuterol (DUONEB) 0.5-2.5 (3) MG/3ML SOLN nebulizer solution Inhale 3 mLs into the lungs every 4 hours as needed for Shortness of Breath 1/11/19   Amalia Roger MD   vitamin B-1 100 MG tablet Take 1 tablet by mouth daily 10/13/18   Jarod Polo MD   vitamin B-12 250 MCG tablet Take 1 tablet by mouth daily 10/13/18   Jarod Polo MD   Multiple Vitamins-Minerals (THERAPEUTIC MULTIVITAMIN-MINERALS) tablet Take 1 tablet by mouth daily 8/12/18   Jim Belle MD Jorge       SOCIAL HISTORY     Tobacco: yes  Alcohol: no  Illicits: fomer  Occupation: disabled    FAMILY HISTORY     Family History   Problem Relation Age of Onset    Breast Cancer Mother     Heart Disease Father        PHYSICAL EXAM     Vitals: BP (!) 174/90   Pulse 85   Temp 98.3 °F (36.8 °C)   Resp 22   Ht 4' 11\" (1.499 m)   Wt 100 lb (45.4 kg)   LMP 2015   SpO2 93%   BMI 20.20 kg/m²   Tmax: Temp (24hrs), Av.8 °F (37.1 °C), Min:98.3 °F (36.8 °C), Max:99.2 °F (37.3 °C)    Last Body weight:   Wt Readings from Last 3 Encounters:   22 100 lb (45.4 kg)   22 100 lb (45.4 kg)   22 100 lb (45.4 kg)     Body Mass Index : Body mass index is 20.2 kg/m². PHYSICAL EXAMINATION:  Physical Exam  Constitutional:       General: She is not in acute distress. Appearance: Normal appearance. She is normal weight. She is not ill-appearing, toxic-appearing or diaphoretic. HENT:      Mouth/Throat:      Mouth: Mucous membranes are moist.      Pharynx: Oropharynx is clear. Eyes:      General: No scleral icterus. Cardiovascular:      Rate and Rhythm: Normal rate and regular rhythm. Pulses: Normal pulses. Heart sounds: Normal heart sounds. No murmur heard. No friction rub. No gallop. Pulmonary:      Effort: Pulmonary effort is normal.      Breath sounds: Wheezing and rhonchi present. Abdominal:      General: Abdomen is flat. Bowel sounds are normal. There is no distension. Palpations: Abdomen is soft. There is no mass. Tenderness: There is no abdominal tenderness. There is no guarding or rebound. Hernia: No hernia is present. Musculoskeletal:         General: No swelling, tenderness, deformity or signs of injury. Right lower leg: No edema. Left lower leg: No edema. Skin:     General: Skin is warm and dry. Coloration: Skin is not jaundiced or pale. Findings: No bruising. Neurological:      General: No focal deficit present. Mental Status: She is alert and oriented to person, place, and time. Cranial Nerves: No cranial nerve deficit. Motor: No weakness. Psychiatric:         Mood and Affect: Mood normal.         Behavior: Behavior normal.         Thought Content:  Thought content normal.         Judgment: Judgment normal.           INVESTIGATIONS     Laboratory Testing:     Recent Results (from the past 24 hour(s))   CBC with Auto Differential    Collection Time: 04/30/22  4:38 PM   Result Value Ref Range    WBC 5.0 3.5 - 11.3 k/uL    RBC 3.98 3.95 - 5.11 m/uL    Hemoglobin 13.5 11.9 - 15.1 g/dL    Hematocrit 42.7 36.3 - 47.1 %    .3 (H) 82.6 - 102.9 fL    MCH 33.9 (H) 25.2 - 33.5 pg    MCHC 31.6 28.4 - 34.8 g/dL    RDW 12.7 11.8 - 14.4 %    Platelets 639 857 - 207 k/uL    MPV 9.5 8.1 - 13.5 fL    NRBC Automated 0.0 0.0 per 100 WBC    Seg Neutrophils 60 36 - 65 %    Lymphocytes 23 (L) 24 - 43 %    Monocytes 16 (H) 3 - 12 %    Eosinophils % 1 1 - 4 %    Basophils 0 0 - 2 %    Immature Granulocytes 0 0 %    Segs Absolute 2.98 1.50 - 8.10 k/uL    Absolute Lymph # 1.12 1.10 - 3.70 k/uL    Absolute Mono # 0.78 0.10 - 1.20 k/uL    Absolute Eos # 0.06 0.00 - 0.44 k/uL    Basophils Absolute <0.03 0.00 - 0.20 k/uL    Absolute Immature Granulocyte <0.03 0.00 - 0.30 k/uL    RBC Morphology MACROCYTOSIS PRESENT    Comprehensive Metabolic Panel w/ Reflex to MG    Collection Time: 04/30/22  4:38 PM   Result Value Ref Range    Glucose 64 (L) 70 - 99 mg/dL    BUN 18 8 - 23 mg/dL    CREATININE 0.62 0.50 - 0.90 mg/dL    Calcium 9.8 8.6 - 10.4 mg/dL    Sodium 136 135 - 144 mmol/L    Potassium 4.5 3.7 - 5.3 mmol/L    Chloride 98 98 - 107 mmol/L    CO2 23 20 - 31 mmol/L    Anion Gap 15 9 - 17 mmol/L    Alkaline Phosphatase 146 (H) 35 - 104 U/L    ALT 55 (H) 5 - 33 U/L    AST 79 (H) <32 U/L    Total Bilirubin 0.46 0.3 - 1.2 mg/dL    Total Protein 8.4 (H) 6.4 - 8.3 g/dL    Albumin 4.2 3.5 - 5.2 g/dL    Albumin/Globulin Ratio 1.0 1.0 - 2.5 GFR Non-African American >60 >60 mL/min    GFR African American >60 >60 mL/min    GFR Comment         Lipase    Collection Time: 04/30/22  4:38 PM   Result Value Ref Range    Lipase 38 13 - 60 U/L   Troponin    Collection Time: 05/01/22  9:36 AM   Result Value Ref Range    Troponin, High Sensitivity 10 0 - 14 ng/L   Brain Natriuretic Peptide    Collection Time: 05/01/22  9:36 AM   Result Value Ref Range    Pro- <300 pg/mL   Lipase    Collection Time: 05/01/22  9:36 AM   Result Value Ref Range    Lipase 42 13 - 60 U/L   Comprehensive Metabolic Panel with Bilirubin    Collection Time: 05/01/22  9:36 AM   Result Value Ref Range    Albumin 4.3 3.5 - 5.2 g/dL    Albumin/Globulin Ratio 1.0 1.0 - 2.5    Alkaline Phosphatase 131 (H) 35 - 104 U/L    ALT 53 (H) 5 - 33 U/L    AST 81 (H) <32 U/L    Total Bilirubin 0.41 0.3 - 1.2 mg/dL    Bilirubin, Direct 0.17 <0.31 mg/dL    Bilirubin, Indirect 0.24 0.00 - 1.00 mg/dL    BUN 12 8 - 23 mg/dL    Calcium 9.5 8.6 - 10.4 mg/dL    CREATININE 0.61 0.50 - 0.90 mg/dL    Glucose 86 70 - 99 mg/dL    Total Protein 8.7 (H) 6.4 - 8.3 g/dL    Sodium 135 135 - 144 mmol/L    Potassium 4.9 3.7 - 5.3 mmol/L    Chloride 100 98 - 107 mmol/L    CO2 19 (L) 20 - 31 mmol/L    Anion Gap 16 9 - 17 mmol/L    GFR Non-African American >60 >60 mL/min    GFR African American >60 >60 mL/min    GFR Comment         SPECIMEN REJECTION    Collection Time: 05/01/22  9:36 AM   Result Value Ref Range    Specimen Source . BLOOD     Ordered Test CDP     Reason for Rejection Unable to perform testing: Specimen clotted. Imaging:   CT Head WO Contrast    Result Date: 4/29/2022  1. Patient motion limits evaluation. 2. No gross change when compared to the recent CT. 3. Unchanged focus of hyperdensity involving the left dorsal basal ganglia. 4. No new intracranial hemorrhage is seen. 5. Minimal global parenchymal volume loss with chronic microvascular ischemic changes.  6. Atherosclerosis of the intracranial vasculature. These results were sent to the [facility] [id number] ([address] [facility]) on 4/29/2022 at 1:29 pm to be communicated to the referring/covering health care provider/office. CT ABDOMEN PELVIS W IV CONTRAST Additional Contrast? None    Result Date: 4/30/2022  Stable pancreatic mass. No evidence of metastatic disease. Severe degenerative changes left hip. XR CHEST PORTABLE    Result Date: 5/1/2022  Increased bilateral linear pulmonary opacities could represent diffuse pulmonary edema or infection     XR CHEST PORTABLE    Result Date: 4/29/2022  No acute cardiopulmonary disease. MRI BRAIN WO CONTRAST    Result Date: 4/29/2022  1. No significant change compared to the recent MRI. 2. No acute infarct. 3. Focus is susceptibility along the left dorsal basal ganglia with minimal surrounding T2 hyperintensity. 4. Minimal global parenchymal volume loss with mild-to-moderate chronic microvascular ischemic changes. 5. Chronic lacunar infarcts involving the left caudate, bilateral basal ganglia as well as the leandro. 6. Tiny foci of susceptibility involving the left basal ganglia, right thalamus as well as the leandro, which may represent sequelae of hypertensive microangiopathy.        ASSESSMENT & PLAN     ASSESSMENT:     Primary Problem  Pneumonia    Active Hospital Problems    Diagnosis Date Noted    HLD (hyperlipidemia) [E78.5] 05/01/2022     Priority: Medium    Unable to care for self [Z78.9] 03/08/2022    Homeless [Z59.00] 04/04/2021    COPD exacerbation ([facility] [J44.1] 11/13/2019    Pneumonia [J18.9]     Essential hypertension [I10] 10/11/2018       PLAN:     IMPRESSION  This is a 69 y.o. female who presented with above mentioned complaints and was admitted to inpatient service for further management as follows:     Principal Problem:    Pneumonia  Active Problems:    HLD (hyperlipidemia)    Essential hypertension    COPD exacerbation ([facility]    Homeless    Unable to care for self  Resolved Problems:    * No resolved hospital problems. *      Community acquired pneumonia   - cxr shows bilateral infiltrates  - rhonchi on exam   - productive sputum per patient  - start on ceftriaxone and azithromycin  - check procal    COPD exacerbation  - supplemental O2 as needed  - bronchodilators as needed  - pulse ox monitoring     HTN  - resume coreg, losartan at home dose. HLD  - resume statins    GERD  - restart pepcid    Pancreatic mass  - CTAP showed mass which is 17x 24 mm which is stable from previous imaging   - follows with Dr. Les Tomlinson at [facility]. Is apparently on chemo but \"her sister threw away her meds\"    Homeless  - originally from [place] but left AMA 2x. Will f/u CM for placement      PT/OT/SW-consulted  Discharge Planning:consulted       Concetta Mcgraw MD  Internal Medicine Resident  [facility]  5/1/2022 1:29 PM    Attending Physician Statement  I have discussed the care of Emi Daniels with the resident team. I have examined the patient myself and taken ros and hpi , including pertinent history and exam findings,  with the resident. I have reviewed the key elements of all parts of the encounter with the resident. I agree with the assessment, plan and orders as documented by the resident. Principal Problem:    Pneumonia  Active Problems:    HLD (hyperlipidemia)    H/O of spont intraparenchymal intracranial hemorrhage assoc w/ hypertension    GERD (gastroesophageal reflux disease)    History of osteonecrosis    Essential hypertension    COPD with acute exacerbation (HCC)    History of hepatitis C    Pancreatic mass    Homeless    Unable to care for self  Resolved Problems:    * No resolved hospital problems.  *      Electronically signed by Danni Tiwari MD

## 2022-05-01 NOTE — PROGRESS NOTES
Occupational Therapy  Facility/Department: [facility]  Occupational Therapy Initial Assessment    Name: Danni Cerda  : 1955  MRN: [id number]  Date of Service: 2022     Chief Complaint   Patient presents with    Abdominal Pain       Discharge Recommendations:  Patient would benefit from continued therapy after discharge          Patient Diagnosis(es): There were no encounter diagnoses. Past Medical History:  has a past medical history of Appendicitis, Cerebral artery occlusion with cerebral infarction ([address] CHF (congestive heart failure) ([address] Chronic respiratory failure with hypoxia and hypercapnia (HCC), Chronic rhinitis, Cigarette smoker, COPD (chronic obstructive pulmonary disease) ([address] Depression, Dysphagia, Essential hypertension, GERD (gastroesophageal reflux disease), Heart failure, diastolic, with acute decompensation ([address] Hepatitis C, chronic ([address] History of smoking at least 1 pack per day for at least 30 years, Hyperlipidemia, Hypertension, Migraine, Moderate COPD (chronic obstructive pulmonary disease) ([address] Multiple transfusions, Neurogenic dysphagia, Osteoarthritis, Pneumonia, Scoliosis, and Substance abuse ([address] Past Surgical History:  has a past surgical history that includes  section; LEEP (); Upper gastrointestinal endoscopy (); Gastric bypass surgery; Appendectomy (2017); laparoscopic appendectomy (N/A, 3/27/2017); Upper gastrointestinal endoscopy (2018); Upper gastrointestinal endoscopy (N/A, 2018); pr colonoscopy w/biopsy single/multiple (N/A, 11/10/2018); pr egd transoral biopsy single/multiple (N/A, 2018); hip surgery (Right, 2019); Total hip arthroplasty (Right, 2019); Medication Injection (Left, 3/25/2021); Upper gastrointestinal endoscopy (N/A, 2021); other surgical history (Left, 2021); Medication Injection (Left, 2021); and Medication Injection (Left, 2021).            Assessment Performance deficits / Impairments: Decreased functional mobility ; Decreased ADL status; Decreased safe awareness;Decreased cognition;Decreased endurance;Decreased balance;Decreased high-level IADLs  Assessment: Pt agreeable to OT eval this date requiring extensive encouragement and education on purpose and importance of therapy evaluation. Pt completed bed mobility with CGA and increased time and VCs for performance. Pt requires Min A for LB dressing task while seated EOB d/t decreased dynamic sitting balance and overall poor level of cooperation. Pt completed functional transfers and short functional mobility laterally at bedside with CGA and RW use. Pt will require continued OT services to increase safety and independence with ADLs/IADLs and functional transfers/mobility  Prognosis: Good  Decision Making: Medium Complexity  REQUIRES OT FOLLOW-UP: Yes  Activity Tolerance  Activity Tolerance: Patient Tolerated treatment well;Treatment limited secondary to agitation        Plan   Plan  Times per Week: 2 x/wk  Current Treatment Recommendations: Balance training,Functional mobility training,Endurance training,Cognitive reorientation,Safety education & training,Patient/Caregiver education & training,Equipment evaluation, education, & procurement,Home management training,Self-Care / ADL     Restrictions  Restrictions/Precautions  Restrictions/Precautions: Up as Tolerated  Required Braces or Orthoses?: No    Subjective   General  Patient assessed for rehabilitation services?: Yes  Family / Caregiver Present: No  General Comment  Comments: RN ok'd pt for OT eval this date. Pt agreeable to session requiring encouragement and education on importance of evaluation. Pt reports pain in abdominal region, unrated.      Social/Functional History  Social/Functional History  Type of Home: Facility  Home Equipment: Rollator (pt reports borrowing sister's rollator)  ADL Assistance: Independent  Ambulation Assistance: Independent  Transfer Assistance: Independent  Active : No  Additional Comments: Pt recently admitted to SNF with therapy services, recently returned to sister's home. Limited information obtained d/t pt level of cooperation       Objective   Hearing: Within functional limits         Safety Devices  Type of Devices: Call light within reach;Gait belt;Nurse notified; Left in bed  Restraints  Restraints Initially in Place: No     Bed Mobility Training  Bed Mobility Training: Yes  Overall Level of Assistance: Contact-guard assistance (pt completed supine <> sit with VCs for initiation and encouragement to complete. Pt initially completed supined > long sitting and then was educated on importance of sitting EOB for assessment of balance and strength requiring increased time to obtain)  Interventions: Verbal cues  Supine to Sit: Contact-guard assistance  Sit to Supine: Contact-guard assistance  Balance  Sitting: With support (SBA for static, CGA for dynamic)  Standing: With support (CGA)  Transfer Training  Transfer Training: Yes  Overall Level of Assistance: Contact-guard assistance; Adaptive equipment  Sit to Stand: Contact-guard assistance; Adaptive equipment  Stand to Sit: Contact-guard assistance; Adaptive equipment  Gait  Overall Level of Assistance: Contact-guard assistance; Adaptive equipment (Pt completed short functional mobility task, completing ~3-4 R lateral side steps toward [facility] with CGA and RW use; unsteadiness noted)    AROM: Within functional limits  Strength:  Within functional limits  Coordination: Within functional limits  Tone: Normal  Sensation: Intact     ADL  Feeding: Modified independent ;Setup (pt seated supported in bed feeding self independently at OT arrival)  Grooming: Modified independent ;Setup  UE Bathing: Stand by assistance;Setup;Verbal cueing  LE Bathing: Contact guard assistance;Setup;Verbal cueing  UE Dressing: Stand by assistance;Setup;Verbal cueing  LE Dressing: Minimal assistance;Setup;Verbal cueing (pt requires Min A to don B socks d/t decreased balance and overall poor cooperation)  Toileting: Contact guard assistance;Setup; Increased time to complete    Activity Tolerance  Activity Tolerance: Patient tolerated treatment well          Cognition  Overall Cognitive Status: Exceptions  Following Commands: Follows multistep commands with increased time  Safety Judgement: Decreased awareness of need for assistance;Decreased awareness of need for safety  Insights: Decreased awareness of deficits                  Education Given To: Patient  Education Provided Comments: Pt ed on OT role, OT POC, safety awareness, transfer training, DME use, and importance of continued OT. Pt verbalized good understanding  Education Method: Demonstration;Verbal  Barriers to Learning: None  Education Outcome: Demonstrated understanding  LUE AROM (degrees)  LUE AROM : WFL  Left Hand AROM (degrees)  Left Hand AROM: WFL  RUE AROM (degrees)  RUE AROM : WFL  Right Hand AROM (degrees)  Right Hand AROM: WFL                     AM-PAC Score  AM-PAC Inpatient Daily Activity Raw Score: 20 (05/01/22 1555)  AM-PAC Inpatient ADL T-Scale Score : 42.03 (05/01/22 1555)  ADL Inpatient CMS 0-100% Score: 38.32 (05/01/22 1555)  ADL Inpatient CMS G-Code Modifier : CJ (05/01/22 1555)    Goals  Short Term Goals  Time Frame for Short term goals: By discharge, pt will:  Short Term Goal 1: Demo Mod I for functional transfers and functional mobility with use of LRD for engagement in ADLs/IADLs  Short Term Goal 2: Demo I with UB ADLs, LB ADLs, and toileting tasks  Short Term Goal 3: Demo 6+ min dynamic standing activity with SUP and unilateral hand release for improved balance for ADL/IADL performance  Short Term Goal 4: Demo good safety awareness throughout therapy session with 0 VCs  Short Term Goal 5:  Increase activity tolerance to 30+ min, utilizing EC/WS appropriately with 0 VCs       Therapy Time   Individual Concurrent Group Co-treatment   Time In 1423         Time Out 1440         Minutes 17         Timed Code Treatment Minutes: 8 Minutes       Eric Kim, OTR/L

## 2022-05-01 NOTE — ED NOTES
Pt placed on continuous cardiac monitoring, BP, Pulse ox. EKG obtained.       Joanna Watson RN  05/01/22 0513

## 2022-05-01 NOTE — CARE COORDINATION
CM spoke with Neda Peraza at [facility], pt has left [facility] twice. Neda Peraza informs writer that [facility] has declined SNF placement at this time. Dr Kyle Hernandez discusses hospice with pt, pt declines states she wants chemo treatment for her pancreatic cancer, however she has been non compliant in the past (\"sister threw away chemo pills last year\"). Follow for needs. 12:48 Carrie Mann calls writer back ,she will try for prior-auth again and accept pt back to [facility] if she can get prior Caron Bateman.  Will need pt/ot shamar

## 2022-05-01 NOTE — ED PROVIDER NOTES
[facility]     Emergency Department     Faculty Note/ Attestation      Pt Name: Giancarlo Brown                                       MRN: [id number]  [place] 1955  Date of evaluation: 5/1/2022    Patients PCP:    Shadia العلي MD      Attestation  I performed a history and physical examination of the patient and discussed management with the resident. I reviewed the residents note and agree with the documented findings and plan of care. Any areas of disagreement are noted on the chart. I was personally present for the key portions of any procedures. I have documented in the chart those procedures where I was not present during the key portions. I have reviewed the emergency nurses triage note. I agree with the chief complaint, past medical history, past surgical history, allergies, medications, social and family history as documented unless otherwise noted below. For Physician Assistant/ Nurse Practitioner cases/documentation I have personally evaluated this patient and have completed at least one if not all key elements of the E/M (history, physical exam, and MDM). Additional findings are as noted.       Initial Screens:        Home Coma Scale  Eye Opening: Spontaneous  Best Verbal Response: Oriented  Best Motor Response: Obeys commands  Roxann Coma Scale Score: 15    Vitals:    Vitals:    05/01/22 1015 05/01/22 1030 05/01/22 1045 05/01/22 1100   BP: (!) 177/84 (!) 165/95 (!) 173/84 (!) 174/90   Pulse: 92 89 84 85   Resp: 20 27 20 22   Temp:       SpO2: (!) 87% 95% 96% 93%   Weight:       Height:           CHIEF COMPLAINT       Chief Complaint   Patient presents with    Abdominal Pain             DIAGNOSTIC RESULTS             RADIOLOGY:   XR CHEST PORTABLE   Final Result   Increased bilateral linear pulmonary opacities could represent diffuse   pulmonary edema or infection               LABS:  Labs Reviewed   COMPREHENSIVE METABOLIC PANEL WITH BILIRUBIN - Abnormal; Notable for the following components:       Result Value    Alkaline Phosphatase 131 (*)     ALT 53 (*)     AST 81 (*)     Total Protein 8.7 (*)     CO2 19 (*)     All other components within normal limits   TROPONIN   BRAIN NATRIURETIC PEPTIDE   LIPASE   SPECIMEN REJECTION   CBC WITH AUTO DIFFERENTIAL   PREVIOUS SPECIMEN         EMERGENCY DEPARTMENT COURSE:     -------------------------  BP: (!) 174/90, Temp: 98.3 °F (36.8 °C), Pulse: 85, Resp: 22      Comments    Patient has known pancreatic mass, has been in the hospital several times in the last few days, recently discharged yesterday and returned 12 hours later. Her main complaint is her chest pain however she does have a cough productive of yellowish-greenish sputum. Work-up yesterday for her abdominal pain was reassuring including CT scan, lipase, lab work. Today her chest x-ray shows concern for pneumonia, she is on oxygen at home. At time of discharge after antibiotics and nebulizer treatment it was discovered the patient does not have a home to go to and her family are not amenable to her staying with them. She was staying at an extended care facility but has left and does not have an place to go back to. We will admit her for placement, abdominal pain, and community-acquired pneumonia.     (Please note that portions of this note were completed with a voice recognition program.  Efforts were made to edit the dictations but occasionally words are mis-transcribed.)      Dontae Travis MD,, MD  Attending Emergency Physician         Dontae Travis MD  05/01/22 3099

## 2022-05-02 LAB
ABSOLUTE EOS #: <0.03 K/UL (ref 0–0.44)
ABSOLUTE IMMATURE GRANULOCYTE: <0.03 K/UL (ref 0–0.3)
ABSOLUTE LYMPH #: 1.31 K/UL (ref 1.1–3.7)
ABSOLUTE MONO #: 1.31 K/UL (ref 0.1–1.2)
ADENOVIRUS PCR: NOT DETECTED
ANION GAP SERPL CALCULATED.3IONS-SCNC: 10 MMOL/L (ref 9–17)
BASOPHILS # BLD: 1 % (ref 0–2)
BASOPHILS ABSOLUTE: 0.03 K/UL (ref 0–0.2)
BORDETELLA PARAPERTUSSIS: NOT DETECTED
BORDETELLA PERTUSSIS PCR: NOT DETECTED
BUN BLDV-MCNC: 21 MG/DL (ref 8–23)
CALCIUM SERPL-MCNC: 9.1 MG/DL (ref 8.6–10.4)
CARBOXYHEMOGLOBIN: 1.1 % (ref 0–5)
CHLAMYDIA PNEUMONIAE BY PCR: NOT DETECTED
CHLORIDE BLD-SCNC: 104 MMOL/L (ref 98–107)
CO2: 24 MMOL/L (ref 20–31)
CORONAVIRUS 229E PCR: NOT DETECTED
CORONAVIRUS HKU1 PCR: NOT DETECTED
CORONAVIRUS NL63 PCR: NOT DETECTED
CORONAVIRUS OC43 PCR: NOT DETECTED
CREAT SERPL-MCNC: 0.67 MG/DL (ref 0.5–0.9)
EKG ATRIAL RATE: 92 BPM
EKG P AXIS: 70 DEGREES
EKG P-R INTERVAL: 158 MS
EKG Q-T INTERVAL: 372 MS
EKG QRS DURATION: 72 MS
EKG QTC CALCULATION (BAZETT): 460 MS
EKG R AXIS: 67 DEGREES
EKG T AXIS: 76 DEGREES
EKG VENTRICULAR RATE: 92 BPM
EOSINOPHILS RELATIVE PERCENT: 0 % (ref 1–4)
FIO2: ABNORMAL
GFR AFRICAN AMERICAN: >60 ML/MIN
GFR NON-AFRICAN AMERICAN: >60 ML/MIN
GFR SERPL CREATININE-BSD FRML MDRD: ABNORMAL ML/MIN/{1.73_M2}
GLUCOSE BLD-MCNC: 110 MG/DL (ref 70–99)
HCO3 VENOUS: 24.6 MMOL/L (ref 24–30)
HCT VFR BLD CALC: 37 % (ref 36.3–47.1)
HEMOGLOBIN: 12.2 G/DL (ref 11.9–15.1)
HUMAN METAPNEUMOVIRUS PCR: NOT DETECTED
IMMATURE GRANULOCYTES: 0 %
INFLUENZA A BY PCR: NOT DETECTED
INFLUENZA B BY PCR: NOT DETECTED
LYMPHOCYTES # BLD: 26 % (ref 24–43)
MCH RBC QN AUTO: 34.5 PG (ref 25.2–33.5)
MCHC RBC AUTO-ENTMCNC: 33 G/DL (ref 28.4–34.8)
MCV RBC AUTO: 104.5 FL (ref 82.6–102.9)
MONOCYTES # BLD: 26 % (ref 3–12)
MYCOPLASMA PNEUMONIAE PCR: NOT DETECTED
NEGATIVE BASE EXCESS, VEN: 0.8 MMOL/L (ref 0–2)
NRBC AUTOMATED: 0 PER 100 WBC
O2 SAT, VEN: 91.8 % (ref 60–85)
PARAINFLUENZA 1 PCR: NOT DETECTED
PARAINFLUENZA 2 PCR: NOT DETECTED
PARAINFLUENZA 3 PCR: NOT DETECTED
PARAINFLUENZA 4 PCR: DETECTED
PATIENT TEMP: 37
PCO2, VEN: 46.2 (ref 39–55)
PDW BLD-RTO: 12.7 % (ref 11.8–14.4)
PH VENOUS: 7.35 (ref 7.32–7.42)
PLATELET # BLD: 378 K/UL (ref 138–453)
PMV BLD AUTO: 9.7 FL (ref 8.1–13.5)
PO2, VEN: 60.8 (ref 30–50)
POTASSIUM SERPL-SCNC: 4.3 MMOL/L (ref 3.7–5.3)
RBC # BLD: 3.54 M/UL (ref 3.95–5.11)
RBC # BLD: ABNORMAL 10*6/UL
RESP SYNCYTIAL VIRUS PCR: NOT DETECTED
RHINO/ENTEROVIRUS PCR: NOT DETECTED
SARS-COV-2, PCR: NOT DETECTED
SEG NEUTROPHILS: 47 % (ref 36–65)
SEGMENTED NEUTROPHILS ABSOLUTE COUNT: 2.34 K/UL (ref 1.5–8.1)
SODIUM BLD-SCNC: 138 MMOL/L (ref 135–144)
SPECIMEN DESCRIPTION: ABNORMAL
WBC # BLD: 5 K/UL (ref 3.5–11.3)

## 2022-05-02 PROCEDURE — 6360000002 HC RX W HCPCS: Performed by: STUDENT IN AN ORGANIZED HEALTH CARE EDUCATION/TRAINING PROGRAM

## 2022-05-02 PROCEDURE — 94640 AIRWAY INHALATION TREATMENT: CPT

## 2022-05-02 PROCEDURE — 93010 ELECTROCARDIOGRAM REPORT: CPT | Performed by: INTERNAL MEDICINE

## 2022-05-02 PROCEDURE — 6370000000 HC RX 637 (ALT 250 FOR IP): Performed by: STUDENT IN AN ORGANIZED HEALTH CARE EDUCATION/TRAINING PROGRAM

## 2022-05-02 PROCEDURE — 93005 ELECTROCARDIOGRAM TRACING: CPT | Performed by: INTERNAL MEDICINE

## 2022-05-02 PROCEDURE — 85025 COMPLETE CBC W/AUTO DIFF WBC: CPT

## 2022-05-02 PROCEDURE — 80048 BASIC METABOLIC PNL TOTAL CA: CPT

## 2022-05-02 PROCEDURE — 94761 N-INVAS EAR/PLS OXIMETRY MLT: CPT

## 2022-05-02 PROCEDURE — 2700000000 HC OXYGEN THERAPY PER DAY

## 2022-05-02 PROCEDURE — 76937 US GUIDE VASCULAR ACCESS: CPT

## 2022-05-02 PROCEDURE — 2580000003 HC RX 258: Performed by: STUDENT IN AN ORGANIZED HEALTH CARE EDUCATION/TRAINING PROGRAM

## 2022-05-02 PROCEDURE — 0202U NFCT DS 22 TRGT SARS-COV-2: CPT

## 2022-05-02 PROCEDURE — 1200000000 HC SEMI PRIVATE

## 2022-05-02 PROCEDURE — 36415 COLL VENOUS BLD VENIPUNCTURE: CPT

## 2022-05-02 PROCEDURE — 82805 BLOOD GASES W/O2 SATURATION: CPT

## 2022-05-02 RX ORDER — GUAIFENESIN 600 MG/1
600 TABLET, EXTENDED RELEASE ORAL 2 TIMES DAILY
Status: DISCONTINUED | OUTPATIENT
Start: 2022-05-02 | End: 2022-05-04 | Stop reason: HOSPADM

## 2022-05-02 RX ORDER — IPRATROPIUM BROMIDE AND ALBUTEROL SULFATE 2.5; .5 MG/3ML; MG/3ML
1 SOLUTION RESPIRATORY (INHALATION) EVERY 4 HOURS
Status: DISCONTINUED | OUTPATIENT
Start: 2022-05-02 | End: 2022-05-03

## 2022-05-02 RX ORDER — METHYLPREDNISOLONE SODIUM SUCCINATE 40 MG/ML
40 INJECTION, POWDER, LYOPHILIZED, FOR SOLUTION INTRAMUSCULAR; INTRAVENOUS EVERY 8 HOURS
Status: DISCONTINUED | OUTPATIENT
Start: 2022-05-02 | End: 2022-05-04

## 2022-05-02 RX ORDER — CETIRIZINE HYDROCHLORIDE 10 MG/1
10 TABLET ORAL DAILY
Status: DISCONTINUED | OUTPATIENT
Start: 2022-05-02 | End: 2022-05-04 | Stop reason: HOSPADM

## 2022-05-02 RX ADMIN — GUAIFENESIN 600 MG: 600 TABLET, EXTENDED RELEASE ORAL at 20:57

## 2022-05-02 RX ADMIN — SODIUM CHLORIDE, PRESERVATIVE FREE 10 ML: 5 INJECTION INTRAVENOUS at 08:29

## 2022-05-02 RX ADMIN — OXYCODONE HYDROCHLORIDE AND ACETAMINOPHEN 1 TABLET: 5; 325 TABLET ORAL at 05:11

## 2022-05-02 RX ADMIN — OXYCODONE HYDROCHLORIDE AND ACETAMINOPHEN 1 TABLET: 5; 325 TABLET ORAL at 17:07

## 2022-05-02 RX ADMIN — FAMOTIDINE 20 MG: 20 TABLET, FILM COATED ORAL at 08:30

## 2022-05-02 RX ADMIN — BUDESONIDE AND FORMOTEROL FUMARATE DIHYDRATE 2 PUFF: 80; 4.5 AEROSOL RESPIRATORY (INHALATION) at 07:59

## 2022-05-02 RX ADMIN — CETIRIZINE HYDROCHLORIDE 10 MG: 10 TABLET ORAL at 20:57

## 2022-05-02 RX ADMIN — ALBUTEROL SULFATE 5 MG: 5 SOLUTION RESPIRATORY (INHALATION) at 07:50

## 2022-05-02 RX ADMIN — IPRATROPIUM BROMIDE AND ALBUTEROL SULFATE 1 AMPULE: .5; 2.5 SOLUTION RESPIRATORY (INHALATION) at 21:50

## 2022-05-02 RX ADMIN — GUAIFENESIN 600 MG: 600 TABLET, EXTENDED RELEASE ORAL at 11:40

## 2022-05-02 RX ADMIN — OXYCODONE HYDROCHLORIDE AND ACETAMINOPHEN 1 TABLET: 5; 325 TABLET ORAL at 11:29

## 2022-05-02 RX ADMIN — BUDESONIDE AND FORMOTEROL FUMARATE DIHYDRATE 2 PUFF: 80; 4.5 AEROSOL RESPIRATORY (INHALATION) at 21:50

## 2022-05-02 RX ADMIN — LOSARTAN POTASSIUM 50 MG: 50 TABLET, FILM COATED ORAL at 20:57

## 2022-05-02 RX ADMIN — SODIUM CHLORIDE, PRESERVATIVE FREE 10 ML: 5 INJECTION INTRAVENOUS at 20:59

## 2022-05-02 RX ADMIN — AMITRIPTYLINE HYDROCHLORIDE 10 MG: 10 TABLET, FILM COATED ORAL at 20:57

## 2022-05-02 RX ADMIN — ATORVASTATIN CALCIUM 40 MG: 80 TABLET, FILM COATED ORAL at 20:57

## 2022-05-02 RX ADMIN — CEFTRIAXONE SODIUM 1000 MG: 1 INJECTION, POWDER, FOR SOLUTION INTRAMUSCULAR; INTRAVENOUS at 12:58

## 2022-05-02 RX ADMIN — METHYLPREDNISOLONE SODIUM SUCCINATE 40 MG: 40 INJECTION, POWDER, FOR SOLUTION INTRAMUSCULAR; INTRAVENOUS at 20:57

## 2022-05-02 RX ADMIN — CARVEDILOL 12.5 MG: 12.5 TABLET, FILM COATED ORAL at 08:28

## 2022-05-02 RX ADMIN — IPRATROPIUM BROMIDE AND ALBUTEROL SULFATE 1 AMPULE: .5; 2.5 SOLUTION RESPIRATORY (INHALATION) at 11:33

## 2022-05-02 RX ADMIN — OXYCODONE HYDROCHLORIDE AND ACETAMINOPHEN 1 TABLET: 5; 325 TABLET ORAL at 22:59

## 2022-05-02 RX ADMIN — SODIUM CHLORIDE: 9 INJECTION, SOLUTION INTRAVENOUS at 11:50

## 2022-05-02 RX ADMIN — METHYLPREDNISOLONE SODIUM SUCCINATE 40 MG: 40 INJECTION, POWDER, FOR SOLUTION INTRAMUSCULAR; INTRAVENOUS at 11:54

## 2022-05-02 RX ADMIN — ENOXAPARIN SODIUM 30 MG: 100 INJECTION SUBCUTANEOUS at 08:30

## 2022-05-02 RX ADMIN — Medication 500 MG: at 11:52

## 2022-05-02 RX ADMIN — CARVEDILOL 12.5 MG: 12.5 TABLET, FILM COATED ORAL at 17:49

## 2022-05-02 ASSESSMENT — PAIN SCALES - GENERAL
PAINLEVEL_OUTOF10: 6
PAINLEVEL_OUTOF10: 6
PAINLEVEL_OUTOF10: 9
PAINLEVEL_OUTOF10: 5
PAINLEVEL_OUTOF10: 5
PAINLEVEL_OUTOF10: 8
PAINLEVEL_OUTOF10: 9
PAINLEVEL_OUTOF10: 7

## 2022-05-02 ASSESSMENT — PAIN DESCRIPTION - DESCRIPTORS
DESCRIPTORS: ACHING;SHARP
DESCRIPTORS: ACHING
DESCRIPTORS: ACHING

## 2022-05-02 ASSESSMENT — PAIN DESCRIPTION - FREQUENCY: FREQUENCY: INTERMITTENT

## 2022-05-02 ASSESSMENT — PAIN DESCRIPTION - ONSET: ONSET: ON-GOING

## 2022-05-02 ASSESSMENT — PAIN DESCRIPTION - LOCATION
LOCATION: BACK;ABDOMEN
LOCATION: BACK
LOCATION: BACK

## 2022-05-02 ASSESSMENT — PAIN DESCRIPTION - ORIENTATION
ORIENTATION: LOWER

## 2022-05-02 ASSESSMENT — PAIN DESCRIPTION - PAIN TYPE
TYPE: CHRONIC PAIN
TYPE: CHRONIC PAIN

## 2022-05-02 NOTE — PROGRESS NOTES
[facility] [address]  Occupational Therapy Not Seen Note    DATE: 2022    NAME: Cris Ybarra  MRN: [id number]   : 1955      Patient not seen this date for Occupational Therapy due to:    Patient Declined: Pt c/o pain in R side radiating across chest/abdomnen 8/10. Pt states \"I just need to sleep\".     Next Scheduled Treatment: 5/3    Electronically signed by VERONIQUE Morillo on 2022 at 4:13 PM

## 2022-05-02 NOTE — PROGRESS NOTES
Physical Therapy         Physical Therapy Cancel Note      DATE: 2022    NAME: Bowen Arreola  MRN: [id number]   : 1955      Patient not seen this date for Physical Therapy due to:    Patient declines, in pain, not feeling good.       Electronically signed by Radha Ribeiro PT on 2022 at 3:09 PM

## 2022-05-02 NOTE — PROGRESS NOTES
[facility]  Internal Medicine Teaching Residency Program  Inpatient Daily Progress Note  ______________________________________________________________________________    Patient: Georgia Robles  YOB: 1955   QRU:[id number]    Acct: [de-identified]     Room: [facility]/0624-99  Admit date: 5/1/2022  Today's date: 05/02/22  Number of days in the hospital: 1    SUBJECTIVE   Admitting Diagnosis: Pneumonia  CC: SOB   Pt examined at bedside. Chart & results reviewed. Patient still complaining of cough. Currently on 2L of O2. Still having chronic abdominal pain. ROS:  Constitutional:  negative for chills, fevers, sweats  Respiratory:  Cough, on 2L NC   Cardiovascular:  negative for chest pain, chest pressure/discomfort, lower extremity edema, palpitations  Gastrointestinal: chronic abdominal pain due to pancreatic mass Neurological:  negative for dizziness, headache    BRIEF HISTORY       The patient is a pleasant 69 y.o. female pmh CHF, COPD, HTN, HLD, CVA   presents with a chief complaint of chest pain, SOB and abdominal pain. Of note she came to the ED yesterday with similar sx and was sent home. She states the for the past week she has been having productive cough and difficulty breathing. Has been coughing up yellow sputum with coughing. Has a hx of COPD and states that she has been taking her inhalers. Denies any fevers/chills. Abdominal pain is chronic from a pancreatic mass that is stable that was confirmed with imaging yesterday. In the ED patient was placed on 1.5L of O2 and given 40 prednisone. CXR revealed mild bilateral pulmonary opacities. Lipase is wnl. On exam patient's lung sounds ronchi. Patient is current smoker, no alcohol, former illegal drug user. Patient was a resident in a facility but because she left AMA recently, she was denied readmission.  Went to her sister's house but was told to not come back.        OBJECTIVE     Vital Signs:  BP (!) 149/82   Pulse 81   Temp 98.4 °F (36.9 °C) (Oral)   Resp 20   Ht 4' 11\" (1.499 m)   Wt 100 lb (45.4 kg)   LMP 2015   SpO2 99%   BMI 20.20 kg/m²     Temp (24hrs), Av.4 °F (36.9 °C), Min:98 °F (36.7 °C), Max:98.7 °F (37.1 °C)    In: 10   Out: -     Physical Exam:  Physical Exam  Constitutional:       General: She is not in acute distress. Appearance: Normal appearance. She is normal weight. She is ill-appearing. She is not toxic-appearing or diaphoretic. HENT:      Mouth/Throat:      Mouth: Mucous membranes are moist.      Pharynx: Oropharynx is clear. Eyes:      General: No scleral icterus. Cardiovascular:      Rate and Rhythm: Normal rate and regular rhythm. Pulses: Normal pulses. Heart sounds: Normal heart sounds. No murmur heard. No friction rub. No gallop. Pulmonary:      Breath sounds: Wheezing present. Comments: Diminished   Abdominal:      General: Abdomen is flat. Bowel sounds are normal. There is no distension. Palpations: Abdomen is soft. There is no mass. Tenderness: There is no abdominal tenderness. There is no guarding or rebound. Hernia: No hernia is present. Musculoskeletal:         General: No swelling, tenderness, deformity or signs of injury. Right lower leg: No edema. Left lower leg: No edema. Skin:     General: Skin is warm and dry. Coloration: Skin is not jaundiced or pale. Findings: No bruising. Neurological:      General: No focal deficit present. Mental Status: She is alert and oriented to person, place, and time. Cranial Nerves: No cranial nerve deficit. Motor: No weakness. Psychiatric:         Mood and Affect: Mood normal.         Behavior: Behavior normal.         Thought Content:  Thought content normal.         Judgment: Judgment normal.           Medications:  Scheduled Medications:    methylPREDNISolone  40 mg IntraVENous Q8H    ipratropium-albuterol  1 ampule Inhalation Q4H    guaiFENesin  600 mg Oral BID    sodium chloride flush  5-40 mL IntraVENous 2 times per day    enoxaparin  30 mg SubCUTAneous Daily    cefTRIAXone (ROCEPHIN) IV  1,000 mg IntraVENous Q24H    azithromycin  500 mg IntraVENous Q24H    amitriptyline  10 mg Oral Nightly    atorvastatin  40 mg Oral Nightly    carvedilol  12.5 mg Oral BID WC    famotidine  20 mg Oral Daily    losartan  50 mg Oral Nightly    budesonide-formoterol  2 puff Inhalation BID     Continuous Infusions:    sodium chloride       PRN Medicationssodium chloride flush, 5-40 mL, PRN  sodium chloride, , PRN  ondansetron, 4 mg, Q8H PRN   Or  ondansetron, 4 mg, Q6H PRN  polyethylene glycol, 17 g, Daily PRN  acetaminophen, 650 mg, Q6H PRN   Or  acetaminophen, 650 mg, Q6H PRN  dextromethorphan-guaiFENesin, 10 mL, Q4H PRN  oxyCODONE-acetaminophen, 1 tablet, Q6H PRN        Diagnostic Labs:  CBC:   Recent Labs     04/30/22  1638 05/01/22  1401 05/02/22  0453   WBC 5.0 5.0 5.0   RBC 3.98 3.94* 3.54*   HGB 13.5 13.4 12.2   HCT 42.7 40.2 37.0   .3* 102.0 104.5*   RDW 12.7 12.6 12.7    421 378     BMP:   Recent Labs     04/30/22  1638 05/01/22  0936 05/02/22  0453    135 138   K 4.5 4.9 4.3   CL 98 100 104   CO2 23 19* 24   BUN 18 12 21   CREATININE 0.62 0.61 0.67     BNP: No results for input(s): BNP in the last 72 hours. PT/INR: No results for input(s): PROTIME, INR in the last 72 hours. APTT: No results for input(s): APTT in the last 72 hours. CARDIAC ENZYMES: No results for input(s): CKMB, CKMBINDEX, TROPONINI in the last 72 hours.     Invalid input(s): CKTOTAL;3  FASTING LIPID PANEL:  Lab Results   Component Value Date    CHOL 182 01/27/2020    HDL 77 01/27/2020    TRIG 121 01/27/2020     LIVER PROFILE:   Recent Labs     04/30/22  1638 05/01/22  0936   AST 79* 81*   ALT 55* 53*   BILIDIR  --  0.17   BILITOT 0.46 0.41   ALKPHOS 146* 131*      MICROBIOLOGY:   Lab Results   Component Value Date/Time    CULTURE NO SIGNIFICANT GROWTH 04/21/2022 03:46 AM       Imaging:    CT Head WO Contrast    Result Date: 4/29/2022  1. Patient motion limits evaluation. 2. No gross change when compared to the recent CT. 3. Unchanged focus of hyperdensity involving the left dorsal basal ganglia. 4. No new intracranial hemorrhage is seen. 5. Minimal global parenchymal volume loss with chronic microvascular ischemic changes. 6. Atherosclerosis of the intracranial vasculature. These results were sent to the [facility] [address] ([address] [facility]) on 4/29/2022 at 1:29 pm to be communicated to the referring/covering health care provider/office. CT ABDOMEN PELVIS W IV CONTRAST Additional Contrast? None    Result Date: 4/30/2022  Stable pancreatic mass. No evidence of metastatic disease. Severe degenerative changes left hip. XR CHEST PORTABLE    Result Date: 5/1/2022  Increased bilateral linear pulmonary opacities could represent diffuse pulmonary edema or infection     XR CHEST PORTABLE    Result Date: 4/29/2022  No acute cardiopulmonary disease. MRI BRAIN WO CONTRAST    Result Date: 4/29/2022  1. No significant change compared to the recent MRI. 2. No acute infarct. 3. Focus is susceptibility along the left dorsal basal ganglia with minimal surrounding T2 hyperintensity. 4. Minimal global parenchymal volume loss with mild-to-moderate chronic microvascular ischemic changes. 5. Chronic lacunar infarcts involving the left caudate, bilateral basal ganglia as well as the leandro. 6. Tiny foci of susceptibility involving the left basal ganglia, right thalamus as well as the leandro, which may represent sequelae of hypertensive microangiopathy.        ASSESSMENT & PLAN     Assessment and Plan:    Principal Problem:    Pneumonia  Active Problems:    HLD (hyperlipidemia)    H/O of spont intraparenchymal intracranial hemorrhage assoc w/ hypertension    GERD (gastroesophageal reflux disease)    History of osteonecrosis    Essential hypertension    COPD with acute exacerbation [facility])    History of hepatitis C    Pancreatic mass    Homeless    Unable to care for self  Resolved Problems:    * No resolved hospital problems. *      Community acquired pneumonia   - cxr shows bilateral infiltrates  - rhonchi on exam   - productive sputum per patient  - ceftriaxone and azithromycin     COPD exacerbation  - supplemental O2 as needed  - bronchodilators as needed  - pulse ox monitoring   - duoneb Q4   - solumedrol 40 Q8      HTN  - resume coreg, losartan at home dose.      HLD  - resume statins     GERD  - restart pepcid     Pancreatic mass  - CTAP showed mass which is 17x 24 mm which is stable from previous imaging   - follows with Dr. Les Tomlinson at [facility]. Is apparently on chemo but \"her sister threw away her meds\"     Homeless  - originally from [place] but left AMA 2x.  Will f/u CM for placement      Concetta Mcgraw MD  Internal Medicine Resident  [facility]  5/2/2022 11:08 AM

## 2022-05-02 NOTE — CARE COORDINATION
Social work consult received for homelessness. Pt well known to SW department from previous consults. Currently unit  is working on getting authorization for pt to go to [facility]. Attempted to meet with pt to discuss plan if insurance will not approve SNF. Pt is hopeful insurance will approve SNF because she states she is unable to care for herself. Pt states that she is not feeling well and asked writer to come speak with her when she is feeling better. Per previous notes pt has stayed with her sister and niece in the past however,  It was not an option for her to return to their homes. Pt unable to go to [facility] as she has to many medical needs to return. In past admissions pt was accepted at the [facility] in [facility] and then decided not to go upon discharge. Pt was linked with [facility] mental health in the past but was discharged from their services in January, due to numerous missed appointments. Pt will need to complete new assessment at [facility] walk in clinic in order to reestablish services. Await insurance decision regarding placement, will follow.

## 2022-05-02 NOTE — PLAN OF CARE
Problem: Pain  Goal: Verbalizes/displays adequate comfort level or baseline comfort level  Outcome: Progressing     Problem: Skin/Tissue Integrity  Goal: Absence of new skin breakdown  Description: 1. Monitor for areas of redness and/or skin breakdown  2. Assess vascular access sites hourly  3. Every 4-6 hours minimum:  Change oxygen saturation probe site  4. Every 4-6 hours:  If on nasal continuous positive airway pressure, respiratory therapy assess nares and determine need for appliance change or resting period.   Outcome: Progressing     Problem: Chronic Conditions and Co-morbidities  Goal: Patient's chronic conditions and co-morbidity symptoms are monitored and maintained or improved  Outcome: Progressing     Problem: Safety - Adult  Goal: Free from fall injury  Outcome: Progressing     Problem: ABCDS Injury Assessment  Goal: Absence of physical injury  Outcome: Progressing

## 2022-05-03 LAB
ANION GAP SERPL CALCULATED.3IONS-SCNC: 12 MMOL/L (ref 9–17)
BUN BLDV-MCNC: 12 MG/DL (ref 8–23)
CALCIUM SERPL-MCNC: 9.1 MG/DL (ref 8.6–10.4)
CHLORIDE BLD-SCNC: 104 MMOL/L (ref 98–107)
CO2: 19 MMOL/L (ref 20–31)
CREAT SERPL-MCNC: 0.53 MG/DL (ref 0.5–0.9)
EKG ATRIAL RATE: 74 BPM
EKG P AXIS: 67 DEGREES
EKG P-R INTERVAL: 174 MS
EKG Q-T INTERVAL: 410 MS
EKG QRS DURATION: 72 MS
EKG QTC CALCULATION (BAZETT): 455 MS
EKG R AXIS: 64 DEGREES
EKG T AXIS: 69 DEGREES
EKG VENTRICULAR RATE: 74 BPM
GFR AFRICAN AMERICAN: >60 ML/MIN
GFR NON-AFRICAN AMERICAN: >60 ML/MIN
GFR SERPL CREATININE-BSD FRML MDRD: ABNORMAL ML/MIN/{1.73_M2}
GLUCOSE BLD-MCNC: 114 MG/DL (ref 65–105)
GLUCOSE BLD-MCNC: 116 MG/DL (ref 65–105)
GLUCOSE BLD-MCNC: 207 MG/DL (ref 65–105)
GLUCOSE BLD-MCNC: 241 MG/DL (ref 70–99)
POTASSIUM SERPL-SCNC: 4.3 MMOL/L (ref 3.7–5.3)
REASON FOR REJECTION: NORMAL
SODIUM BLD-SCNC: 135 MMOL/L (ref 135–144)
ZZ NTE CLEAN UP: ORDERED TEST: NORMAL
ZZ NTE WITH NAME CLEAN UP: SPECIMEN SOURCE: NORMAL

## 2022-05-03 PROCEDURE — 6370000000 HC RX 637 (ALT 250 FOR IP): Performed by: STUDENT IN AN ORGANIZED HEALTH CARE EDUCATION/TRAINING PROGRAM

## 2022-05-03 PROCEDURE — 97530 THERAPEUTIC ACTIVITIES: CPT

## 2022-05-03 PROCEDURE — 97110 THERAPEUTIC EXERCISES: CPT

## 2022-05-03 PROCEDURE — 6360000002 HC RX W HCPCS: Performed by: STUDENT IN AN ORGANIZED HEALTH CARE EDUCATION/TRAINING PROGRAM

## 2022-05-03 PROCEDURE — 80048 BASIC METABOLIC PNL TOTAL CA: CPT

## 2022-05-03 PROCEDURE — 93010 ELECTROCARDIOGRAM REPORT: CPT | Performed by: INTERNAL MEDICINE

## 2022-05-03 PROCEDURE — 6360000002 HC RX W HCPCS

## 2022-05-03 PROCEDURE — 1200000000 HC SEMI PRIVATE

## 2022-05-03 PROCEDURE — 36415 COLL VENOUS BLD VENIPUNCTURE: CPT

## 2022-05-03 PROCEDURE — 2580000003 HC RX 258: Performed by: STUDENT IN AN ORGANIZED HEALTH CARE EDUCATION/TRAINING PROGRAM

## 2022-05-03 PROCEDURE — 6360000002 HC RX W HCPCS: Performed by: INTERNAL MEDICINE

## 2022-05-03 PROCEDURE — 2500000003 HC RX 250 WO HCPCS: Performed by: INTERNAL MEDICINE

## 2022-05-03 PROCEDURE — 99232 SBSQ HOSP IP/OBS MODERATE 35: CPT | Performed by: INTERNAL MEDICINE

## 2022-05-03 PROCEDURE — 82947 ASSAY GLUCOSE BLOOD QUANT: CPT

## 2022-05-03 RX ORDER — DEXTROSE MONOHYDRATE 50 MG/ML
100 INJECTION, SOLUTION INTRAVENOUS PRN
Status: DISCONTINUED | OUTPATIENT
Start: 2022-05-03 | End: 2022-05-04 | Stop reason: HOSPADM

## 2022-05-03 RX ORDER — MORPHINE SULFATE 2 MG/ML
2 INJECTION, SOLUTION INTRAMUSCULAR; INTRAVENOUS ONCE
Status: COMPLETED | OUTPATIENT
Start: 2022-05-03 | End: 2022-05-03

## 2022-05-03 RX ORDER — INSULIN LISPRO 100 [IU]/ML
0-3 INJECTION, SOLUTION INTRAVENOUS; SUBCUTANEOUS NIGHTLY
Status: DISCONTINUED | OUTPATIENT
Start: 2022-05-03 | End: 2022-05-04 | Stop reason: HOSPADM

## 2022-05-03 RX ORDER — INSULIN LISPRO 100 [IU]/ML
0-6 INJECTION, SOLUTION INTRAVENOUS; SUBCUTANEOUS
Status: DISCONTINUED | OUTPATIENT
Start: 2022-05-03 | End: 2022-05-04 | Stop reason: HOSPADM

## 2022-05-03 RX ORDER — DEXTROSE MONOHYDRATE 25 G/50ML
12.5 INJECTION, SOLUTION INTRAVENOUS PRN
Status: DISCONTINUED | OUTPATIENT
Start: 2022-05-03 | End: 2022-05-04 | Stop reason: HOSPADM

## 2022-05-03 RX ORDER — CEPHALEXIN 500 MG/1
500 CAPSULE ORAL 2 TIMES DAILY
Qty: 5 CAPSULE | Refills: 0 | Status: SHIPPED | OUTPATIENT
Start: 2022-05-03 | End: 2022-05-04 | Stop reason: HOSPADM

## 2022-05-03 RX ORDER — IPRATROPIUM BROMIDE AND ALBUTEROL SULFATE 2.5; .5 MG/3ML; MG/3ML
1 SOLUTION RESPIRATORY (INHALATION) EVERY 4 HOURS PRN
Status: DISCONTINUED | OUTPATIENT
Start: 2022-05-03 | End: 2022-05-04 | Stop reason: HOSPADM

## 2022-05-03 RX ORDER — OXYCODONE HYDROCHLORIDE AND ACETAMINOPHEN 5; 325 MG/1; MG/1
1 TABLET ORAL ONCE
Status: COMPLETED | OUTPATIENT
Start: 2022-05-03 | End: 2022-05-03

## 2022-05-03 RX ORDER — MORPHINE SULFATE 2 MG/ML
1 INJECTION, SOLUTION INTRAMUSCULAR; INTRAVENOUS ONCE
Status: COMPLETED | OUTPATIENT
Start: 2022-05-03 | End: 2022-05-03

## 2022-05-03 RX ORDER — FENTANYL CITRATE 50 UG/ML
25 INJECTION, SOLUTION INTRAMUSCULAR; INTRAVENOUS ONCE
Status: COMPLETED | OUTPATIENT
Start: 2022-05-03 | End: 2022-05-03

## 2022-05-03 RX ORDER — NICOTINE POLACRILEX 4 MG
15 LOZENGE BUCCAL PRN
Status: DISCONTINUED | OUTPATIENT
Start: 2022-05-03 | End: 2022-05-04 | Stop reason: HOSPADM

## 2022-05-03 RX ORDER — HYDRALAZINE HYDROCHLORIDE 20 MG/ML
10 INJECTION INTRAMUSCULAR; INTRAVENOUS EVERY 6 HOURS PRN
Status: DISCONTINUED | OUTPATIENT
Start: 2022-05-03 | End: 2022-05-04 | Stop reason: HOSPADM

## 2022-05-03 RX ORDER — ALBUTEROL SULFATE 2.5 MG/3ML
2.5 SOLUTION RESPIRATORY (INHALATION) EVERY 6 HOURS PRN
Status: DISCONTINUED | OUTPATIENT
Start: 2022-05-03 | End: 2022-05-04 | Stop reason: HOSPADM

## 2022-05-03 RX ORDER — OXYCODONE HYDROCHLORIDE AND ACETAMINOPHEN 5; 325 MG/1; MG/1
1 TABLET ORAL EVERY 6 HOURS PRN
Qty: 15 TABLET | Refills: 0 | Status: SHIPPED | OUTPATIENT
Start: 2022-05-03 | End: 2022-05-04

## 2022-05-03 RX ORDER — PREDNISONE 20 MG/1
40 TABLET ORAL DAILY
Qty: 20 TABLET | Refills: 0 | Status: SHIPPED | OUTPATIENT
Start: 2022-05-03 | End: 2022-05-08

## 2022-05-03 RX ORDER — AZITHROMYCIN 500 MG/1
500 TABLET, FILM COATED ORAL DAILY
Qty: 3 TABLET | Refills: 0 | Status: SHIPPED | OUTPATIENT
Start: 2022-05-03 | End: 2022-05-06

## 2022-05-03 RX ADMIN — METHYLPREDNISOLONE SODIUM SUCCINATE 40 MG: 40 INJECTION, POWDER, FOR SOLUTION INTRAMUSCULAR; INTRAVENOUS at 04:13

## 2022-05-03 RX ADMIN — CEFTRIAXONE SODIUM 1000 MG: 1 INJECTION, POWDER, FOR SOLUTION INTRAMUSCULAR; INTRAVENOUS at 12:49

## 2022-05-03 RX ADMIN — OXYCODONE HYDROCHLORIDE AND ACETAMINOPHEN 1 TABLET: 5; 325 TABLET ORAL at 15:52

## 2022-05-03 RX ADMIN — Medication 500 MG: at 10:19

## 2022-05-03 RX ADMIN — OXYCODONE HYDROCHLORIDE AND ACETAMINOPHEN 1 TABLET: 5; 325 TABLET ORAL at 09:22

## 2022-05-03 RX ADMIN — SODIUM CHLORIDE, PRESERVATIVE FREE 10 ML: 5 INJECTION INTRAVENOUS at 09:17

## 2022-05-03 RX ADMIN — OXYCODONE HYDROCHLORIDE AND ACETAMINOPHEN 1 TABLET: 5; 325 TABLET ORAL at 22:08

## 2022-05-03 RX ADMIN — ATORVASTATIN CALCIUM 40 MG: 80 TABLET, FILM COATED ORAL at 20:36

## 2022-05-03 RX ADMIN — ENOXAPARIN SODIUM 30 MG: 100 INJECTION SUBCUTANEOUS at 09:24

## 2022-05-03 RX ADMIN — AMITRIPTYLINE HYDROCHLORIDE 10 MG: 10 TABLET, FILM COATED ORAL at 21:05

## 2022-05-03 RX ADMIN — FAMOTIDINE 20 MG: 20 TABLET, FILM COATED ORAL at 10:17

## 2022-05-03 RX ADMIN — ONDANSETRON 4 MG: 2 INJECTION INTRAMUSCULAR; INTRAVENOUS at 05:29

## 2022-05-03 RX ADMIN — SODIUM CHLORIDE: 9 INJECTION, SOLUTION INTRAVENOUS at 10:25

## 2022-05-03 RX ADMIN — SODIUM CHLORIDE, PRESERVATIVE FREE 10 ML: 5 INJECTION INTRAVENOUS at 18:38

## 2022-05-03 RX ADMIN — HYDRALAZINE HYDROCHLORIDE 10 MG: 20 INJECTION INTRAMUSCULAR; INTRAVENOUS at 18:31

## 2022-05-03 RX ADMIN — METHYLPREDNISOLONE SODIUM SUCCINATE 40 MG: 40 INJECTION, POWDER, FOR SOLUTION INTRAMUSCULAR; INTRAVENOUS at 12:44

## 2022-05-03 RX ADMIN — MORPHINE SULFATE 2 MG: 2 INJECTION, SOLUTION INTRAMUSCULAR; INTRAVENOUS at 18:38

## 2022-05-03 RX ADMIN — INSULIN LISPRO 2 UNITS: 100 INJECTION, SOLUTION INTRAVENOUS; SUBCUTANEOUS at 18:13

## 2022-05-03 RX ADMIN — GUAIFENESIN 600 MG: 600 TABLET, EXTENDED RELEASE ORAL at 09:23

## 2022-05-03 RX ADMIN — GUAIFENESIN 600 MG: 600 TABLET, EXTENDED RELEASE ORAL at 20:36

## 2022-05-03 RX ADMIN — FENTANYL CITRATE 25 MCG: 50 INJECTION, SOLUTION INTRAMUSCULAR; INTRAVENOUS at 12:43

## 2022-05-03 RX ADMIN — MORPHINE SULFATE 1 MG: 2 INJECTION, SOLUTION INTRAMUSCULAR; INTRAVENOUS at 20:37

## 2022-05-03 RX ADMIN — OXYCODONE HYDROCHLORIDE AND ACETAMINOPHEN 1 TABLET: 5; 325 TABLET ORAL at 04:54

## 2022-05-03 RX ADMIN — SODIUM CHLORIDE, PRESERVATIVE FREE 10 ML: 5 INJECTION INTRAVENOUS at 20:37

## 2022-05-03 RX ADMIN — CETIRIZINE HYDROCHLORIDE 10 MG: 10 TABLET ORAL at 10:17

## 2022-05-03 RX ADMIN — METHYLPREDNISOLONE SODIUM SUCCINATE 40 MG: 40 INJECTION, POWDER, FOR SOLUTION INTRAMUSCULAR; INTRAVENOUS at 20:36

## 2022-05-03 RX ADMIN — LOSARTAN POTASSIUM 50 MG: 50 TABLET, FILM COATED ORAL at 20:36

## 2022-05-03 RX ADMIN — HYDRALAZINE HYDROCHLORIDE 10 MG: 20 INJECTION INTRAMUSCULAR; INTRAVENOUS at 09:15

## 2022-05-03 ASSESSMENT — PAIN SCALES - GENERAL
PAINLEVEL_OUTOF10: 9
PAINLEVEL_OUTOF10: 1
PAINLEVEL_OUTOF10: 9
PAINLEVEL_OUTOF10: 8
PAINLEVEL_OUTOF10: 9
PAINLEVEL_OUTOF10: 8
PAINLEVEL_OUTOF10: 1
PAINLEVEL_OUTOF10: 10

## 2022-05-03 ASSESSMENT — PAIN DESCRIPTION - LOCATION
LOCATION: ABDOMEN
LOCATION: CHEST;ABDOMEN;BACK
LOCATION: BACK;ABDOMEN

## 2022-05-03 ASSESSMENT — PAIN DESCRIPTION - ORIENTATION: ORIENTATION: LEFT;LOWER;MID

## 2022-05-03 ASSESSMENT — PAIN DESCRIPTION - DESCRIPTORS
DESCRIPTORS: SHARP;ACHING;DISCOMFORT
DESCRIPTORS: SHARP;ACHING;DISCOMFORT

## 2022-05-03 NOTE — PLAN OF CARE
Problem: Pain  Goal: Verbalizes/displays adequate comfort level or baseline comfort level  5/3/2022 0612 by Darren Campbell RN  Outcome: Progressing  5/2/2022 1854 by Marina Ceja RN  Outcome: Progressing     Problem: Skin/Tissue Integrity  Goal: Absence of new skin breakdown  Description: 1. Monitor for areas of redness and/or skin breakdown  2. Assess vascular access sites hourly  3. Every 4-6 hours minimum:  Change oxygen saturation probe site  4. Every 4-6 hours:  If on nasal continuous positive airway pressure, respiratory therapy assess nares and determine need for appliance change or resting period.   5/3/2022 0612 by Darren Campbell RN  Outcome: Progressing  5/2/2022 1854 by Marina Ceja RN  Outcome: Progressing     Problem: Chronic Conditions and Co-morbidities  Goal: Patient's chronic conditions and co-morbidity symptoms are monitored and maintained or improved  5/3/2022 0612 by Darren Campbell RN  Outcome: Progressing  5/2/2022 1854 by Marina Ceja RN  Outcome: Progressing     Problem: Safety - Adult  Goal: Free from fall injury  5/3/2022 0612 by Darren Campbell RN  Outcome: Progressing  5/2/2022 1854 by Marina Ceja RN  Outcome: Progressing     Problem: ABCDS Injury Assessment  Goal: Absence of physical injury  5/3/2022 0612 by Darren Campbell RN  Outcome: Progressing  5/2/2022 1854 by Marina Ceja RN  Outcome: Progressing

## 2022-05-03 NOTE — PROGRESS NOTES
Physical Therapy  Facility/Department: [facility] ONC/MED SURG  Physical Therapy Daily Treatment Note    Name: Bowen Arreola  : 1955  MRN: [id number]  Date of Service: 5/3/2022    Discharge Recommendations:  Patient would benefit from continued therapy after discharge          Patient Diagnosis(es): There were no encounter diagnoses. Past Medical History:  has a past medical history of Appendicitis, Cerebral artery occlusion with cerebral infarction ([address] CHF (congestive heart failure) ([address] Chronic respiratory failure with hypoxia and hypercapnia (HCC), Chronic rhinitis, Cigarette smoker, COPD (chronic obstructive pulmonary disease) ([address] Depression, Dysphagia, Essential hypertension, GERD (gastroesophageal reflux disease), Heart failure, diastolic, with acute decompensation ([address] Hepatitis C, chronic ([address] History of smoking at least 1 pack per day for at least 30 years, Hyperlipidemia, Hypertension, Migraine, Moderate COPD (chronic obstructive pulmonary disease) ([address] Multiple transfusions, Neurogenic dysphagia, Osteoarthritis, Pneumonia, Scoliosis, and Substance abuse ([address] Past Surgical History:  has a past surgical history that includes  section; LEEP (); Upper gastrointestinal endoscopy (); Gastric bypass surgery; Appendectomy (2017); laparoscopic appendectomy (N/A, 3/27/2017); Upper gastrointestinal endoscopy (2018); Upper gastrointestinal endoscopy (N/A, 2018); pr colonoscopy w/biopsy single/multiple (N/A, 11/10/2018); pr egd transoral biopsy single/multiple (N/A, 2018); hip surgery (Right, 2019); Total hip arthroplasty (Right, 2019); Medication Injection (Left, 3/25/2021); Upper gastrointestinal endoscopy (N/A, 2021); other surgical history (Left, 2021); Medication Injection (Left, 2021); and Medication Injection (Left, 2021).     Assessment   Body Structures, Functions, Activity Limitations Requiring Skilled Therapeutic Intervention: Decreased functional mobility ; Decreased endurance;Decreased posture;Decreased high-level IADLs; Increased pain  Assessment: Pt having 9/10 pain this day in abdominal area; RN aware. Pt refused ambulation due to pain but agreeable to sitting EOB x 15 minutes with SBA. Sit<->stand transfers with CGA. Previously, pt ambulated 3ft with RW CGA, no LOB noted throughout session. Pt would benefit from continued skilled physical therapy to promote return to independent baseline upon discharge. Therapy Prognosis: Good  Activity Tolerance  Activity Tolerance: Patient limited by pain; Patient limited by fatigue;Patient limited by endurance     Plan   Plan  Plan:  (5x/wk)  Current Treatment Recommendations: Strengthening,ROM,Balance training,Gait training,Stair training,Functional mobility training,Transfer training,Endurance training,Patient/Caregiver education & training  Safety Devices  Type of Devices: Call light within reach,Gait belt,Nurse notified,Left in bed  Restraints  Restraints Initially in Place: No     Restrictions  Restrictions/Precautions  Restrictions/Precautions: Isolation  Required Braces or Orthoses?: No  Position Activity Restriction  Other position/activity restrictions: droplet precautions, pt on 2L NC hi basilia. Subjective   General  Chart Reviewed: Yes  Response To Previous Treatment: Patient with no complaints from previous session. Family / Caregiver Present: No  General Comment  Comments: Pt returned to supine upon exiting with call light. Subjective  Subjective: RN agreeable to PT. Pt needed encouragement to participate. pt supine in bed upon PT arrival, pt cooperative with encouragement. Pt having 9/10 pain in abdominal area. RN in room and aware. Pt having L chronic hip pain with mobility. Cognition   Orientation  Overall Orientation Status: Within Functional Limits  Cognition  Overall Cognitive Status: Exceptions  Following Commands:  Follows multistep commands with increased time  Safety Judgement: Decreased awareness of need for assistance;Decreased awareness of need for safety  Insights: Decreased awareness of deficits     Objective      Bed mobility  Supine to Sit: Contact guard assistance  Sit to Supine: Minimal assistance (Wild with L LE due to pain in L hip.)  Comment: HOB slightly elevated with use of bed rail. Increased time and effort. Transfers  Sit to Stand: Contact guard assistance  Stand to sit: Contact guard assistance  Comment: Assessed to RW  Ambulation  Distance:  (Pt refusing ambulation due to increased pain.)     Balance  Posture: Fair  Sitting - Static: Fair;+  Sitting - Dynamic: Fair  Standing - Static: Fair;-  Comments: standing balance assessed w/ RW; Assessed sitting EOB  A/AROM Exercises: AROM supine B ankle pumps and B quad sets x 10 reps with tactile and verbal cues for proper technique. Static Sitting Balance Exercises: pt sat EOB x 15 minutes with SBA to maintain  Dynamic Sitting Balance Exercises: .sit x 10 reps with tactile and verbal cues for proper technique  Static Standing Balance Exercises: Stood at RW x 1-2 minutes x 2 trials with CGA to maintain. Pt refused to ambulate due to pain.         OutComes Score   AM-PAC Score  AM-PAC Inpatient Mobility Raw Score : 17 (05/03/22 1040)  -PAC Inpatient T-Scale Score : 42.13 (05/03/22 1040)  Mobility Inpatient CMS 0-100% Score: 50.57 (05/03/22 1040)  Mobility Inpatient CMS G-Code Modifier : CK (05/03/22 1040)          Goals  Short Term Goals  Time Frame for Short term goals: 14  Short term goal 1: Pt to perform bed mobility independently  Short term goal 2: Demonstrate functional transfers independently  Short term goal 3: Pt to ambulate 150ft w/ RW independently  Short term goal 4: Tolerate 30 minutes of therapy to demo increased endurance  Patient Goals   Patient goals : Did not state       Therapy Time   Individual Concurrent Group Co-treatment   Time In 1246 [address]         Time Out 1033 Minutes 40         Timed Code Treatment Minutes: [address] [place]lin Potts

## 2022-05-03 NOTE — PROGRESS NOTES
[facility]  Internal Medicine Teaching Residency Program  Inpatient Daily Progress Note  ______________________________________________________________________________    Patient: Wing Greenwood  YOB: 1955   CJK:[id number]    Acct: [de-identified]     Room: [facility]/7342-  Admit date: 5/1/2022  Today's date: 05/03/22  Number of days in the hospital: 2    SUBJECTIVE   Admitting Diagnosis: Pneumonia  CC: SOB   Pt examined at bedside. Chart & results reviewed. Patient still complaining of cough. Was off O2 this AM. BP elevated. Will start hydralazine. CM still working on dispo. resp panel is positive for parainfluenza 4. ROS:  Constitutional:  negative for chills, fevers, sweats  Respiratory:  Cough,  Cardiovascular:  negative for chest pain, chest pressure/discomfort, lower extremity edema, palpitations  Gastrointestinal: chronic abdominal pain due to pancreatic mass Neurological:  negative for dizziness, headache    BRIEF HISTORY       The patient is a pleasant 69 y.o. female pmh CHF, COPD, HTN, HLD, CVA   presents with a chief complaint of chest pain, SOB and abdominal pain. Of note she came to the ED yesterday with similar sx and was sent home. She states the for the past week she has been having productive cough and difficulty breathing. Has been coughing up yellow sputum with coughing. Has a hx of COPD and states that she has been taking her inhalers. Denies any fevers/chills. Abdominal pain is chronic from a pancreatic mass that is stable that was confirmed with imaging yesterday. In the ED patient was placed on 1.5L of O2 and given 40 prednisone. CXR revealed mild bilateral pulmonary opacities. Lipase is wnl. On exam patient's lung sounds ronchi. Patient is current smoker, no alcohol, former illegal drug user. Patient was a resident in a facility but because she left AMA recently, she was denied readmission.  Went to her sister's house but was told to not come back.        OBJECTIVE     Vital Signs:  BP (!) 186/107   Pulse 62   Temp 98.3 °F (36.8 °C) (Oral)   Resp 16   Ht 4' 11\" (1.499 m)   Wt 100 lb (45.4 kg)   LMP 2015   SpO2 100%   BMI 20.20 kg/m²     Temp (24hrs), Av.5 °F (36.9 °C), Min:98.3 °F (36.8 °C), Max:98.8 °F (37.1 °C)    In: 810   Out: 800 [Urine:800]    Physical Exam:  Physical Exam  Constitutional:       General: She is not in acute distress. Appearance: Normal appearance. She is normal weight. She is ill-appearing. She is not toxic-appearing or diaphoretic. HENT:      Mouth/Throat:      Mouth: Mucous membranes are moist.      Pharynx: Oropharynx is clear. Eyes:      General: No scleral icterus. Cardiovascular:      Rate and Rhythm: Normal rate and regular rhythm. Pulses: Normal pulses. Heart sounds: Normal heart sounds. No murmur heard. No friction rub. No gallop. Pulmonary:      Breath sounds: Wheezing present. Comments: Diminished   Abdominal:      General: Abdomen is flat. Bowel sounds are normal. There is no distension. Palpations: Abdomen is soft. There is no mass. Tenderness: There is no abdominal tenderness. There is no guarding or rebound. Hernia: No hernia is present. Musculoskeletal:         General: No swelling, tenderness, deformity or signs of injury. Right lower leg: No edema. Left lower leg: No edema. Skin:     General: Skin is warm and dry. Coloration: Skin is not jaundiced or pale. Findings: No bruising. Neurological:      General: No focal deficit present. Mental Status: She is alert and oriented to person, place, and time. Cranial Nerves: No cranial nerve deficit. Motor: No weakness. Psychiatric:         Mood and Affect: Mood normal.         Behavior: Behavior normal.         Thought Content:  Thought content normal.         Judgment: Judgment normal.           Medications:  Scheduled Medications:    cefTRIAXone (ROCEPHIN) IV 1,000 mg IntraVENous Q24H    insulin lispro  0-6 Units SubCUTAneous TID     insulin lispro  0-3 Units SubCUTAneous Nightly    methylPREDNISolone  40 mg IntraVENous Q8H    ipratropium-albuterol  1 ampule Inhalation Q4H    guaiFENesin  600 mg Oral BID    cetirizine  10 mg Oral Daily    sodium chloride flush  5-40 mL IntraVENous 2 times per day    enoxaparin  30 mg SubCUTAneous Daily    azithromycin  500 mg IntraVENous Q24H    amitriptyline  10 mg Oral Nightly    atorvastatin  40 mg Oral Nightly    carvedilol  12.5 mg Oral BID     famotidine  20 mg Oral Daily    losartan  50 mg Oral Nightly    budesonide-formoterol  2 puff Inhalation BID     Continuous Infusions:    dextrose      sodium chloride 20 mL/hr at 05/02/22 1150     PRN Medicationsglucose, 15 g, PRN  dextrose, 12.5 g, PRN  glucagon (rDNA), 1 mg, PRN  dextrose, 100 mL/hr, PRN  hydrALAZINE, 10 mg, Q6H PRN  sodium chloride flush, 5-40 mL, PRN  sodium chloride, , PRN  ondansetron, 4 mg, Q8H PRN   Or  ondansetron, 4 mg, Q6H PRN  polyethylene glycol, 17 g, Daily PRN  acetaminophen, 650 mg, Q6H PRN   Or  acetaminophen, 650 mg, Q6H PRN  dextromethorphan-guaiFENesin, 10 mL, Q4H PRN  oxyCODONE-acetaminophen, 1 tablet, Q6H PRN        Diagnostic Labs:  CBC:   Recent Labs     04/30/22  1638 05/01/22  1401 05/02/22  0453   WBC 5.0 5.0 5.0   RBC 3.98 3.94* 3.54*   HGB 13.5 13.4 12.2   HCT 42.7 40.2 37.0   .3* 102.0 104.5*   RDW 12.7 12.6 12.7    421 378     BMP:   Recent Labs     05/01/22  0936 05/02/22  0453 05/03/22  0650    138 135   K 4.9 4.3 4.3    104 104   CO2 19* 24 19*   BUN 12 21 12   CREATININE 0.61 0.67 0.53     BNP: No results for input(s): BNP in the last 72 hours. PT/INR: No results for input(s): PROTIME, INR in the last 72 hours. APTT: No results for input(s): APTT in the last 72 hours. CARDIAC ENZYMES: No results for input(s): CKMB, CKMBINDEX, TROPONINI in the last 72 hours.     Invalid input(s): CKTOTAL;3  FASTING LIPID PANEL:  Lab Results   Component Value Date    CHOL 182 01/27/2020    HDL 77 01/27/2020    TRIG 121 01/27/2020     LIVER PROFILE:   Recent Labs     04/30/22  1638 05/01/22  0936   AST 79* 81*   ALT 55* 53*   BILIDIR  --  0.17   BILITOT 0.46 0.41   ALKPHOS 146* 131*      MICROBIOLOGY:   Lab Results   Component Value Date/Time    CULTURE NO SIGNIFICANT GROWTH 04/21/2022 03:46 AM       Imaging:    CT Head WO Contrast    Result Date: 4/29/2022  1. Patient motion limits evaluation. 2. No gross change when compared to the recent CT. 3. Unchanged focus of hyperdensity involving the left dorsal basal ganglia. 4. No new intracranial hemorrhage is seen. 5. Minimal global parenchymal volume loss with chronic microvascular ischemic changes. 6. Atherosclerosis of the intracranial vasculature. These results were sent to the [facility] [address] ([address] [place] [address]) on 4/29/2022 at 1:29 pm to be communicated to the referring/covering health care provider/office. CT ABDOMEN PELVIS W IV CONTRAST Additional Contrast? None    Result Date: 4/30/2022  Stable pancreatic mass. No evidence of metastatic disease. Severe degenerative changes left hip. XR CHEST PORTABLE    Result Date: 5/1/2022  Increased bilateral linear pulmonary opacities could represent diffuse pulmonary edema or infection     XR CHEST PORTABLE    Result Date: 4/29/2022  No acute cardiopulmonary disease. MRI BRAIN WO CONTRAST    Result Date: 4/29/2022  1. No significant change compared to the recent MRI. 2. No acute infarct. 3. Focus is susceptibility along the left dorsal basal ganglia with minimal surrounding T2 hyperintensity. 4. Minimal global parenchymal volume loss with mild-to-moderate chronic microvascular ischemic changes. 5. Chronic lacunar infarcts involving the left caudate, bilateral basal ganglia as well as the leandro.  6. Tiny foci of susceptibility involving the left basal ganglia, right thalamus as well as the leandro, which may represent sequelae of hypertensive microangiopathy. ASSESSMENT & PLAN     Assessment and Plan:    Principal Problem:    Pneumonia  Active Problems:    HLD (hyperlipidemia)    H/O of spont intraparenchymal intracranial hemorrhage assoc w/ hypertension    GERD (gastroesophageal reflux disease)    History of osteonecrosis    Essential hypertension    COPD with acute exacerbation (HCC)    History of hepatitis C    Pancreatic mass    Homeless    Unable to care for self  Resolved Problems:    * No resolved hospital problems. *      Community acquired pneumonia   - cxr shows bilateral infiltrates  - rhonchi on exam   - productive sputum per patient  - ceftriaxone and azithromycin     COPD exacerbation  - supplemental O2 as needed  - bronchodilators as needed  - pulse ox monitoring   - duoneb Q4   - solumedrol 40 Q8   - was off O2 this AM      HTN  - resume coreg, losartan at home dose. - started hydralazine prn due to persistently high bp      HLD  - resume statins     GERD  - restart pepcid     Pancreatic mass  - CTAP showed mass which is 17x 24 mm which is stable from previous imaging   - follows with Dr. Maryann Lemus at [facility]. Is apparently on chemo but \"her sister threw away her meds\"     Homeless  - originally from [place] but left AMA 2x. Will f/u CM for placement. Will hear back form berenice today. Queen Jeremias MD  Internal Medicine Resident  Shree Loera  5/3/2022 9:07 AM    Attending Physician Statement  I have discussed the care of Emi Daniels with the resident team. I have examined the patient myself and taken ros and hpi , including pertinent history and exam findings,  with the resident. I have reviewed the key elements of all parts of the encounter with the resident. I agree with the assessment, plan and orders as documented by the resident.     Principal Problem:    Pneumonia  Active Problems:    HLD (hyperlipidemia)    H/O of spont intraparenchymal intracranial hemorrhage assoc w/ hypertension    GERD (gastroesophageal reflux disease)    History of osteonecrosis    Essential hypertension    COPD with acute exacerbation ([facility]    History of hepatitis C    Pancreatic mass    Homeless    Unable to care for self  Resolved Problems:    * No resolved hospital problems. *    COPD improving, patient ready for discharge.   Daily labs discontinued  Difficulty finding [id number] to be done today        Electronically signed by Janna Anne MD

## 2022-05-03 NOTE — CARE COORDINATION
Transitional Planning:  SOLIS received call from MARY at [facility]. She states that [facility] will not look at the behavioral request for precert until the physician has completed the P2P for the 1st medical precert denial.      Devan Wood requests that 2 dates & times that MD is available for P2P be faxed to [phone] and then they will arrange the time. CM at [facility] is Marie Troncoso [phone]. Above information relayed to Daiana Cain CM who is now following.

## 2022-05-03 NOTE — PROGRESS NOTES
[address]  Occupational Therapy Not Seen Note    DATE: 5/3/2022    NAME: Debbie John  MRN: [id number]   : 1955      Patient not seen this date for Occupational Therapy due to:    Patient Declined: Pt c/o high pain radiating from R side across abdomen and into back, RN aware. Next Scheduled Treatment:  ns.     Electronically signed by VERONIQUE Hargrove on 5/3/2022 at 11:49 AM

## 2022-05-03 NOTE — CARE COORDINATION
Scott served Dr. Byron Calzada regarding the patient needing a P2P. Awaiting a response. [facility]  requests that 2 dates & times that MD is available for P2P be faxed to [phone] and then they will arrange the time. SOLIS at [facility] is Marie [phone].

## 2022-05-03 NOTE — PLAN OF CARE
Problem: Pain  Goal: Verbalizes/displays adequate comfort level or baseline comfort level  5/3/2022 1948 by Indira Goldsmith RN  Outcome: Progressing  5/3/2022 0612 by Maicol Rothman RN  Outcome: Progressing     Problem: Skin/Tissue Integrity  Goal: Absence of new skin breakdown  Description: 1. Monitor for areas of redness and/or skin breakdown  2. Assess vascular access sites hourly  3. Every 4-6 hours minimum:  Change oxygen saturation probe site  4. Every 4-6 hours:  If on nasal continuous positive airway pressure, respiratory therapy assess nares and determine need for appliance change or resting period.   5/3/2022 1948 by Indira Goldsmith RN  Outcome: Progressing  5/3/2022 0612 by Maicol Rothman RN  Outcome: Progressing     Problem: Chronic Conditions and Co-morbidities  Goal: Patient's chronic conditions and co-morbidity symptoms are monitored and maintained or improved  5/3/2022 1948 by Indira Goldsmith RN  Outcome: Progressing  5/3/2022 0612 by Maicol Rothman RN  Outcome: Progressing     Problem: Safety - Adult  Goal: Free from fall injury  5/3/2022 1948 by Indira Goldsmith RN  Outcome: Progressing  5/3/2022 0612 by Maicol Rothman RN  Outcome: Progressing     Problem: ABCDS Injury Assessment  Goal: Absence of physical injury  5/3/2022 1948 by Indira Goldsmith RN  Outcome: Progressing  5/3/2022 0612 by Maicol Rothman RN  Outcome: Progressing

## 2022-05-03 NOTE — DISCHARGE INSTR - COC
Continuity of Care Form    Patient Name: Debbie John   :  1955  MRN:  [id number]    Admit date:  2022  Discharge date:  2022     Code Status Order: Full Code   Advance Directives:      Admitting Physician:  Rossy Tejada MD  PCP: Pantera Marcelino MD    Discharging Nurse:  Román Trejo Unit/Room#: 5546/3963-67  Discharging Unit Phone Number: [phone]    Emergency Contact:   Extended Emergency Contact Information  Primary Emergency Contact: Neville Shah  Home Phone: [phone]  Mobile Phone: [phone]  Relation: Child    Past Surgical History:  Past Surgical History:   Procedure Laterality Date    APPENDECTOMY  2017     SECTION      GASTRIC BYPASS SURGERY      HIP SURGERY Right 2019    HIP TOTAL ARTHROPLASTY ANTERIOR APPROACH - [facility],  C-ARM, (Right )    LAPAROSCOPIC APPENDECTOMY N/A 3/27/2017    APPENDECTOMY LAPAROSCOPIC performed by Stephie Nagy MD at [address] [place] [address]  2002    hgsil    MEDICATION INJECTION Left 3/25/2021    STEROID INJECTION LEFT HIP  WITH FLUORO performed by Vannesa Pearce MD at [address]  Left 2021    INJECTION STEROID LEFT HIP WITH FLUORO- ATTEMPTED performed by Vannesa Pearce MD at [address] Left 2021    INJECTION STEROID LEFT HIP performed by Vannesa Pearce MD at [place] [address] Left 2021    hip injection    OH COLONOSCOPY W/BIOPSY SINGLE/MULTIPLE N/A 11/10/2018    COLONOSCOPY WITH BIOPSY performed by Becki Bowman MD at [facility]    OH EGD TRANSORAL BIOPSY SINGLE/MULTIPLE N/A 2018    EGD ESOPHAGOGASTRODUODENOSCOPY performed by Michael Clay MD at [address] [place] [address] Right 2019    HIP TOTAL ARTHROPLASTY [facility]TERIOR [facility]PROACH - [facility],  [facility], performed by Cyril Proctor DO at [address]      oesophagitis, candidiasis    UPPER GASTROINTESTINAL ENDOSCOPY 11/05/2018    UPPER GASTROINTESTINAL ENDOSCOPY N/A 11/5/2018    EGD CONTROL HEMORRHAGE performed by Latisha Augustin MD at [address] 4/5/2021    EGD W/EUS FNA performed by Sheri Vuong MD at [facility]       Immunization History:   Immunization History   Administered Date(s) Administered    COVID-19, Moderna, Booster, PF, 50mcg/0.25ml 03/08/2022    COVID-19, Climmie Messier, Primary or Immunocompromised, PF, 100mcg/0.5mL 07/13/2021, 08/10/2021       Active Problems:  Patient Active Problem List   Diagnosis Code    Essential hypertension I10    Hepatitis C B19.20    History of substance abuse ([address] R31.49    Alcoholic cirrhosis of liver without ascites ([address] K70.30    PUD (peptic ulcer disease) K27.9    Anemia, macrocytic D53.9    Duodenal ulcer with hemorrhage K26.4    Psychosis, intermittent requring antipsychotic meds F29    Opiate withdrawal ([address] F11.23    Pneumonia J18.9    Pancreas head lesion S36.200A    COPD with acute exacerbation ([address] J44.1    Polysubstance abuse ([address] F19.10    Right flank pain R10.9    Avascular necrosis of bone of right hip ([address] M87.051    Closed fracture of head of right femur ([address] S72.051A    COPD without exacerbation ([address] J44.9    Hypokalemia E87.6    History of hepatitis C Z86.19    Avascular necrosis (HCC) M87.00    History of total right hip replacement Z96.641    Protein calorie malnutrition ([address] E46    COPD exacerbation (HCC) J44.1    Chronic diastolic heart failure (HCC) I50.32    Right sided weakness R53.1    Chronic daily headache R51.9    Memory changes R41.3    Generalized weakness R53.1    Stroke with cerebral ischemia (HCC) I63.9    SOB (shortness of breath) R06.02    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.052    Pulmonary embolism on right (HCC) I26.99    Multiple subsegmental pulmonary emboli without acute cor pulmonale (HCC) I26.94    Acute on chronic blood loss anemia D62    Severe comorbid illness R69    Primary osteoarthritis of left hip M16.12    Chronic anticoagulation Z79.01    Chronic idiopathic constipation K59.04    DDD (degenerative disc disease), lumbar M51.36    Medication management Z79.899    Pancreatic mass K86.89    Homeless Z59.00    Colitis K52.9    Epigastric pain R10.13    Right-sided chest wall pain R07.89    CRYSTAL (acute kidney injury) ([address] N17.9    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 A0    Hypoglycemia E16.2    History of pulmonary embolism Z86.711    Noncompliance Z91.19    Crack cocaine use F14.90    Moderate episode of recurrent major depressive disorder (HCC) F33.1    Atelectasis J98.11    Abdominal pain R10.9    Migraine G43.909    Migraine variants, not intractable G43.809    Pain of right hip joint M25.551    Arthritis M19.90    Hip arthritis M16.10    Migraine aura without headache G43.109    Social problem Z65.9    Soft tissue tumor, malignant (HCC) C49.9    Unable to care for self Z78.9    Intraparenchymal hemorrhage of brain (HCC) I61.9    Basal ganglia hemorrhage (HCC) I61.0    History of blood clots:  On Eliquis Z86.718    HLD (hyperlipidemia) E78.5    H/O of spont intraparenchymal intracranial hemorrhage assoc w/ hypertension Z86.79    GERD (gastroesophageal reflux disease) K21.9    History of osteonecrosis Z87.39       Isolation/Infection:   Isolation            No Isolation          Patient Infection Status       Infection Onset Added Last Indicated Last Indicated By Review Planned Expiration Resolved Resolved By    None active    Resolved    COVID-19 (Rule Out) 05/02/22 05/02/22 05/02/22 Respiratory Panel, Molecular, with COVID-19 (Restricted: peds pts or suitable admitted adults) (Ordered)   05/02/22 Rule-Out Test Resulted    COVID-19 (Rule Out) 03/04/22 03/04/22 03/04/22 COVID-19, Rapid (Ordered)   03/04/22 Rule-Out Test Resulted            Nurse Assessment:  Last Vital Signs: BP (!) 149/86   Pulse 86   Temp 98.3 °F (36.8 °C) (Oral)   Resp 18   Ht 4' 11\" (1.499 m)   Wt 100 lb (45.4 kg)   LMP 03/18/2015   SpO2 100%   BMI 20.20 kg/m²     Last documented pain score (0-10 scale): Pain Level: 9  Last Weight:   Wt Readings from Last 1 Encounters:   05/03/22 100 lb (45.4 kg)     Mental Status:  oriented and alert    IV Access:  - None    Nursing Mobility/ADLs:  Walking   Assisted  Transfer  Assisted  Bathing  Assisted  Dressing  Assisted  Toileting  Assisted  Feeding  Independent  Med Admin  Assisted  Med Delivery   whole    Wound Care Documentation and Therapy:        Elimination:  Continence: Bowel: Yes  Bladder: Yes  Urinary Catheter: None   Colostomy/Ileostomy/Ileal Conduit: No       Date of Last BM: 05/03/2022    Intake/Output Summary (Last 24 hours) at 5/3/2022 1521  Last data filed at [facility]  Gross per 24 hour   Intake --   Output 450 ml   Net -450 ml     I/O last 3 completed shifts: In: 80 [P.O.:800; I.V.:10]  Out: 800 [Urine:800]    Safety Concerns: At Risk for Falls    Impairments/Disabilities:      None    Nutrition Therapy:  Current Nutrition Therapy:   - Oral Diet:  General    Routes of Feeding: Oral  Liquids: No Restrictions  Daily Fluid Restriction: no  Last Modified Barium Swallow with Video (Video Swallowing Test): not done    Treatments at the Time of Hospital Discharge:   Respiratory Treatments: albuterol (PROVENTIL) nebulizer solution 2.5 mg   Dose 2.5 mg : Nebulization : EVERY 6 HOURS PRN : Wheezing   Oxygen Therapy:  is on oxygen at 2 L/min per nasal cannula.   Ventilator:    - No ventilator support    Rehab Therapies: Physical Therapy and Occupational Therapy  Weight Bearing Status/Restrictions: No weight bearing restrictions  Other Medical Equipment (for information only, NOT a DME order):  walker  Other Treatments: NA    Patient's personal belongings (please select all that are sent with patient):  None    RN SIGNATURE:  Electronically signed by Bella John RN on 5/4/22 at 12:05 PM EDT    CASE MANAGEMENT/SOCIAL WORK SECTION    Inpatient Status Date: ***    Readmission Risk Assessment Score:  Readmission Risk              Risk of Unplanned Readmission:  50           Discharging to Facility/ [facility] Details  Fax          [address] [place] [address]       Phone: [phone]       Fax: [phone]        Dialysis Facility (if applicable)   Name:  Address:  Dialysis Schedule:  Phone:  Fax:    / signature: Electronically signed by Peralta RN on 5/4/22 at 3:19 PM EDT    PHYSICIAN SECTION    Prognosis: Fair    Condition at Discharge: Stable    Rehab Potential (if transferring to Rehab): Fair    Recommended Labs or Other Treatments After Discharge: None    Physician Certification: I certify the above information and transfer of Emi Daniels  is necessary for the continuing treatment of the diagnosis listed and that she requires [facility] for greater 30 days.      Update Admission H&P: No change in H&P    PHYSICIAN SIGNATURE:  Electronically signed by Aurelia Parham MD on 5/3/22 at 6:21 PM EDT

## 2022-05-03 NOTE — ACP (ADVANCE CARE PLANNING)
..Advance Care Planning     Advance Care Planning Activator (Inpatient)  Conversation Note      Date of ACP Conversation: 5/3/2022     Conversation Conducted with: Patient with Decision Making Capacity    ACP Activator: Remy RN        Health Care Decision Maker:     Current Designated Health Care Decision Maker:     Primary Decision Maker (Active): Alexander Lassiter - [phone]    Today we documented Decision Maker(s) consistent with Legal Next of Kin hierarchy. Care Preferences    Ventilation: \"If you were in your present state of health and suddenly became very ill and were unable to breathe on your own, what would your preference be about the use of a ventilator (breathing machine) if it were available to you? \"      Would the patient desire the use of ventilator (breathing machine)?: yes    \"If your health worsens and it becomes clear that your chance of recovery is unlikely, what would your preference be about the use of a ventilator (breathing machine) if it were available to you? \"     Would the patient desire the use of ventilator (breathing machine)?: Yes      Resuscitation  \"CPR works best to restart the heart when there is a sudden event, like a heart attack, in someone who is otherwise healthy. Unfortunately, CPR does not typically restart the heart for people who have serious health conditions or who are very sick. \"    \"In the event your heart stopped as a result of an underlying serious health condition, would you want attempts to be made to restart your heart (answer \"yes\" for attempt to resuscitate) or would you prefer a natural death (answer \"no\" for do not attempt to resuscitate)? \" yes

## 2022-05-04 VITALS
DIASTOLIC BLOOD PRESSURE: 77 MMHG | HEIGHT: 59 IN | SYSTOLIC BLOOD PRESSURE: 157 MMHG | RESPIRATION RATE: 17 BRPM | WEIGHT: 100 LBS | TEMPERATURE: 98.1 F | BODY MASS INDEX: 20.16 KG/M2 | HEART RATE: 87 BPM | OXYGEN SATURATION: 97 %

## 2022-05-04 PROBLEM — B34.8 INFECTION DUE TO PARAINFLUENZA VIRUS 4: Status: ACTIVE | Noted: 2022-05-04

## 2022-05-04 LAB
GLUCOSE BLD-MCNC: 101 MG/DL (ref 65–105)
GLUCOSE BLD-MCNC: 125 MG/DL (ref 65–105)
GLUCOSE BLD-MCNC: 131 MG/DL (ref 65–105)

## 2022-05-04 PROCEDURE — 94760 N-INVAS EAR/PLS OXIMETRY 1: CPT

## 2022-05-04 PROCEDURE — 2700000000 HC OXYGEN THERAPY PER DAY

## 2022-05-04 PROCEDURE — 82947 ASSAY GLUCOSE BLOOD QUANT: CPT

## 2022-05-04 PROCEDURE — 6370000000 HC RX 637 (ALT 250 FOR IP): Performed by: STUDENT IN AN ORGANIZED HEALTH CARE EDUCATION/TRAINING PROGRAM

## 2022-05-04 PROCEDURE — 6360000002 HC RX W HCPCS: Performed by: STUDENT IN AN ORGANIZED HEALTH CARE EDUCATION/TRAINING PROGRAM

## 2022-05-04 PROCEDURE — 2580000003 HC RX 258: Performed by: STUDENT IN AN ORGANIZED HEALTH CARE EDUCATION/TRAINING PROGRAM

## 2022-05-04 PROCEDURE — 99231 SBSQ HOSP IP/OBS SF/LOW 25: CPT | Performed by: INTERNAL MEDICINE

## 2022-05-04 RX ORDER — METHYLPREDNISOLONE SODIUM SUCCINATE 40 MG/ML
40 INJECTION, POWDER, LYOPHILIZED, FOR SOLUTION INTRAMUSCULAR; INTRAVENOUS EVERY 12 HOURS
Status: DISCONTINUED | OUTPATIENT
Start: 2022-05-04 | End: 2022-05-04 | Stop reason: HOSPADM

## 2022-05-04 RX ORDER — OXYCODONE HYDROCHLORIDE AND ACETAMINOPHEN 5; 325 MG/1; MG/1
1 TABLET ORAL EVERY 6 HOURS PRN
Qty: 8 TABLET | Refills: 0 | Status: SHIPPED | OUTPATIENT
Start: 2022-05-04 | End: 2022-05-07

## 2022-05-04 RX ADMIN — GUAIFENESIN 600 MG: 600 TABLET, EXTENDED RELEASE ORAL at 10:06

## 2022-05-04 RX ADMIN — CARVEDILOL 12.5 MG: 12.5 TABLET, FILM COATED ORAL at 10:06

## 2022-05-04 RX ADMIN — SODIUM CHLORIDE: 9 INJECTION, SOLUTION INTRAVENOUS at 10:10

## 2022-05-04 RX ADMIN — SODIUM CHLORIDE, PRESERVATIVE FREE 10 ML: 5 INJECTION INTRAVENOUS at 10:20

## 2022-05-04 RX ADMIN — OXYCODONE HYDROCHLORIDE AND ACETAMINOPHEN 1 TABLET: 5; 325 TABLET ORAL at 18:35

## 2022-05-04 RX ADMIN — ENOXAPARIN SODIUM 30 MG: 100 INJECTION SUBCUTANEOUS at 10:22

## 2022-05-04 RX ADMIN — FAMOTIDINE 20 MG: 20 TABLET, FILM COATED ORAL at 10:06

## 2022-05-04 RX ADMIN — METHYLPREDNISOLONE SODIUM SUCCINATE 40 MG: 40 INJECTION, POWDER, FOR SOLUTION INTRAMUSCULAR; INTRAVENOUS at 03:32

## 2022-05-04 RX ADMIN — CETIRIZINE HYDROCHLORIDE 10 MG: 10 TABLET ORAL at 10:06

## 2022-05-04 RX ADMIN — OXYCODONE HYDROCHLORIDE AND ACETAMINOPHEN 1 TABLET: 5; 325 TABLET ORAL at 07:07

## 2022-05-04 RX ADMIN — HYDRALAZINE HYDROCHLORIDE 10 MG: 20 INJECTION INTRAMUSCULAR; INTRAVENOUS at 17:24

## 2022-05-04 RX ADMIN — OXYCODONE HYDROCHLORIDE AND ACETAMINOPHEN 1 TABLET: 5; 325 TABLET ORAL at 12:56

## 2022-05-04 RX ADMIN — METHYLPREDNISOLONE SODIUM SUCCINATE 40 MG: 40 INJECTION, POWDER, FOR SOLUTION INTRAMUSCULAR; INTRAVENOUS at 16:31

## 2022-05-04 RX ADMIN — ACETAMINOPHEN 650 MG: 325 TABLET ORAL at 11:38

## 2022-05-04 RX ADMIN — Medication 500 MG: at 10:15

## 2022-05-04 ASSESSMENT — PAIN DESCRIPTION - LOCATION
LOCATION: ABDOMEN;CHEST;BACK
LOCATION: ABDOMEN;CHEST;BACK
LOCATION: BACK
LOCATION: ABDOMEN

## 2022-05-04 ASSESSMENT — PAIN - FUNCTIONAL ASSESSMENT: PAIN_FUNCTIONAL_ASSESSMENT: PREVENTS OR INTERFERES SOME ACTIVE ACTIVITIES AND ADLS

## 2022-05-04 ASSESSMENT — PAIN DESCRIPTION - DESCRIPTORS
DESCRIPTORS: DISCOMFORT;DULL
DESCRIPTORS: DISCOMFORT
DESCRIPTORS: DISCOMFORT;DULL

## 2022-05-04 ASSESSMENT — PAIN SCALES - GENERAL
PAINLEVEL_OUTOF10: 9
PAINLEVEL_OUTOF10: 9
PAINLEVEL_OUTOF10: 8
PAINLEVEL_OUTOF10: 8

## 2022-05-04 ASSESSMENT — PAIN DESCRIPTION - ORIENTATION
ORIENTATION: LOWER;LEFT;RIGHT
ORIENTATION: LEFT;LOWER

## 2022-05-04 ASSESSMENT — PAIN DESCRIPTION - PAIN TYPE: TYPE: ACUTE PAIN;CHRONIC PAIN

## 2022-05-04 NOTE — PROGRESS NOTES
[facility]  Internal Medicine Teaching Residency Program  Inpatient Daily Progress Note  ______________________________________________________________________________    Patient: Kp Tiwari  YOB: 1955   NHW:[id number]    Acct: [de-identified]     Room: [address] [place], [place] [address]  Admit date: 5/1/2022  Today's date: 05/04/22  Number of days in the hospital: 3    SUBJECTIVE   Admitting Diagnosis: Pneumonia  CC: chest pain and shortness of breath    No acute issues overnight  Pt examined at bedside. Complains of some abdominal pain  reports cough and shortness of breath got better  reports that she wants to go back to [place]    Chart & results reviewed. Has been afebrile  HR has been stable  Blood pressure on the higher side  On 3 L NC    No labs today    Patient is eating ok, sleeping ok, normal bowel/ bladder movements. respiratory panel positive for parainfluenza virus  On rocephin and azithromycin for pneumonia      ROS:  Constitutional:  negative for chills, fevers, sweats  Respiratory:  negative for cough, dyspnea on exertion, hemoptysis, shortness of breath, wheezing  Cardiovascular:  negative for chest pain, chest pressure/discomfort, lower extremity edema, palpitations  Gastrointestinal:  Positive for abdominal pain  Neurological:  negative for dizziness, headache    BRIEF HISTORY     The patient is a pleasant 67 y.o. female pmh CHF, COPD, HTN, HLD, CVA   presents with a chief complaint of chest pain, SOB and abdominal pain. Of note she came to the ED yesterday with similar sx and was sent home. She states the for the past week she has been having productive cough and difficulty breathing. Has been coughing up yellow sputum with coughing. Has a hx of COPD and states that she has been taking her inhalers. Denies any fevers/chills.  Abdominal pain is chronic from a pancreatic mass that is stable that was confirmed with imaging yesterday.  In the ED patient was placed on 1.5L of O2 and given 40 prednisone. CXR revealed mild bilateral pulmonary opacities. Lipase is wnl. On exam patient's lung sounds ronchi. Patient is current smoker, no alcohol, former illegal drug user. Patient was a resident in a facility but because she left A recently, she was denied readmission. Went to her sister's house but was told to not come back. OBJECTIVE     Vital Signs:  /74   Pulse 97   Temp 98.9 °F (37.2 °C) (Oral)   Resp 16   Ht 4' 11\" (1.499 m)   Wt 100 lb (45.4 kg)   LMP 2015   SpO2 99%   BMI 20.20 kg/m²     Temp (24hrs), Av.6 °F (37 °C), Min:98.3 °F (36.8 °C), Max:98.9 °F (37.2 °C)    No intake/output data recorded. Physical Exam:  Physical Exam  Constitutional:       Comments: malnourished   HENT:      Mouth/Throat:      Mouth: Mucous membranes are dry. Eyes:      Extraocular Movements: Extraocular movements intact. Cardiovascular:      Rate and Rhythm: Normal rate and regular rhythm. Pulses: Normal pulses. Heart sounds: Normal heart sounds. No murmur heard. Pulmonary:      Breath sounds: Wheezing present. Abdominal:      General: There is no distension. Palpations: Abdomen is soft. Tenderness: There is no abdominal tenderness. Musculoskeletal:         General: No swelling. Cervical back: Normal range of motion. No rigidity. Right lower leg: No edema. Left lower leg: No edema. Skin:     General: Skin is warm. Coloration: Skin is not jaundiced. Findings: No bruising or rash. Neurological:      General: No focal deficit present. Mental Status: She is alert and oriented to person, place, and time.    Psychiatric:         Mood and Affect: Mood normal.         Behavior: Behavior normal.           Medications:  Scheduled Medications:    cefTRIAXone (ROCEPHIN) IV  1,000 mg IntraVENous Q24H    insulin lispro  0-6 Units SubCUTAneous TID WC    insulin lispro  0-3 Units SubCUTAneous Nightly    methylPREDNISolone  40 mg IntraVENous Q8H    guaiFENesin  600 mg Oral BID    cetirizine  10 mg Oral Daily    sodium chloride flush  5-40 mL IntraVENous 2 times per day    enoxaparin  30 mg SubCUTAneous Daily    azithromycin  500 mg IntraVENous Q24H    amitriptyline  10 mg Oral Nightly    atorvastatin  40 mg Oral Nightly    carvedilol  12.5 mg Oral BID WC    famotidine  20 mg Oral Daily    losartan  50 mg Oral Nightly    budesonide-formoterol  2 puff Inhalation BID     Continuous Infusions:    dextrose      sodium chloride 20 mL/hr at 05/03/22 1025     PRN Medicationsglucose, 15 g, PRN  dextrose, 12.5 g, PRN  glucagon (rDNA), 1 mg, PRN  dextrose, 100 mL/hr, PRN  hydrALAZINE, 10 mg, Q6H PRN  ipratropium-albuterol, 1 ampule, Q4H PRN  albuterol, 2.5 mg, Q6H PRN  sodium chloride flush, 5-40 mL, PRN  sodium chloride, , PRN  ondansetron, 4 mg, Q8H PRN   Or  ondansetron, 4 mg, Q6H PRN  polyethylene glycol, 17 g, Daily PRN  acetaminophen, 650 mg, Q6H PRN   Or  acetaminophen, 650 mg, Q6H PRN  dextromethorphan-guaiFENesin, 10 mL, Q4H PRN  oxyCODONE-acetaminophen, 1 tablet, Q6H PRN        Diagnostic Labs:  CBC:   Recent Labs     05/01/22  1401 05/02/22  0453   WBC 5.0 5.0   RBC 3.94* 3.54*   HGB 13.4 12.2   HCT 40.2 37.0   .0 104.5*   RDW 12.6 12.7    378     BMP:   Recent Labs     05/01/22  0936 05/02/22  0453 05/03/22  0650    138 135   K 4.9 4.3 4.3    104 104   CO2 19* 24 19*   BUN 12 21 12   CREATININE 0.61 0.67 0.53     BNP: No results for input(s): BNP in the last 72 hours. PT/INR: No results for input(s): PROTIME, INR in the last 72 hours. APTT: No results for input(s): APTT in the last 72 hours. CARDIAC ENZYMES: No results for input(s): CKMB, CKMBINDEX, TROPONINI in the last 72 hours.     Invalid input(s): CKTOTAL;3  FASTING LIPID PANEL:  Lab Results   Component Value Date    CHOL 182 01/27/2020    HDL 77 01/27/2020    TRIG 121 01/27/2020     LIVER PROFILE:   Recent Labs 05/01/22  0936   AST 81*   ALT 53*   BILIDIR 0.17   BILITOT 0.41   ALKPHOS 131*      MICROBIOLOGY:   Lab Results   Component Value Date/Time    CULTURE NO SIGNIFICANT GROWTH 04/21/2022 03:46 AM       Imaging:    CT Head WO Contrast    Result Date: 4/29/2022  1. Patient motion limits evaluation. 2. No gross change when compared to the recent CT. 3. Unchanged focus of hyperdensity involving the left dorsal basal ganglia. 4. No new intracranial hemorrhage is seen. 5. Minimal global parenchymal volume loss with chronic microvascular ischemic changes. 6. Atherosclerosis of the intracranial vasculature. These results were sent to the [facility] [address] ([address] [place] [address]) on 4/29/2022 at 1:29 pm to be communicated to the referring/covering health care provider/office. CT ABDOMEN PELVIS W IV CONTRAST Additional Contrast? None    Result Date: 4/30/2022  Stable pancreatic mass. No evidence of metastatic disease. Severe degenerative changes left hip. XR CHEST PORTABLE    Result Date: 5/1/2022  Increased bilateral linear pulmonary opacities could represent diffuse pulmonary edema or infection     XR CHEST PORTABLE    Result Date: 4/29/2022  No acute cardiopulmonary disease. MRI BRAIN WO CONTRAST    Result Date: 4/29/2022  1. No significant change compared to the recent MRI. 2. No acute infarct. 3. Focus is susceptibility along the left dorsal basal ganglia with minimal surrounding T2 hyperintensity. 4. Minimal global parenchymal volume loss with mild-to-moderate chronic microvascular ischemic changes. 5. Chronic lacunar infarcts involving the left caudate, bilateral basal ganglia as well as the leandro. 6. Tiny foci of susceptibility involving the left basal ganglia, right thalamus as well as the leandro, which may represent sequelae of hypertensive microangiopathy.        ASSESSMENT & PLAN     Assessment and Plan:    Principal Problem:    Pneumonia  Active Problems:    HLD (hyperlipidemia)    H/O of spont intraparenchymal intracranial hemorrhage assoc w/ hypertension    GERD (gastroesophageal reflux disease)    History of osteonecrosis    Essential hypertension    COPD with acute exacerbation ([facility]    History of hepatitis C    Pancreatic mass    Homeless    Unable to care for self  Resolved Problems:    * No resolved hospital problems. *      Community acquired pneumonia   - cxr shows bilateral infiltrates  - rhonchi on exam   - productive sputum per patient  - ceftriaxone and azithromycin     COPD exacerbation  - supplemental O2 as needed  - bronchodilators as needed  - pulse ox monitoring   - duoneb Q4   - solumedrol 40 Q8   - was off O2 this AM      HTN  - coreg 12.5 mg BID  - cozaar 50 mg nightly daily  - started hydralazine prn due to persistently high bp      HLD  - resume statins     GERD  - restart pepcid     Pancreatic mass  - CTAP showed mass which is 17x 24 mm which is stable from previous imaging   - follows with Dr. Aretha Godwin at [facility]  - originally from [place] but left [facility] 2x. Will f/u CM for placement. Will hear back form berenice today. Diet: regular diet  DVT ppx : lovenox  GI ppx: pepcid    PT/OT: recommends continued therapy after discharge  Discharge Planning / SW: ? [facility]    Melvindaniel Tamra  PGY-1  Internal Medicine  [address] [facility]   8:45 [place] 5/4/2022     Attending Physician Statement  I have discussed the care of Emi Daniels with the resident team. I have examined the patient myself and taken ros and hpi , including pertinent history and exam findings,  with the resident. I have reviewed the key elements of all parts of the encounter with the resident. I agree with the assessment, plan and orders as documented by the resident.     Principal Problem:    Pneumonia  Active Problems:    HLD (hyperlipidemia)    H/O of spont intraparenchymal intracranial hemorrhage assoc w/ hypertension    GERD (gastroesophageal reflux disease)    History of osteonecrosis    Essential hypertension    COPD with acute exacerbation ([facility]    History of hepatitis C    Pancreatic mass    Homeless    Unable to care for self  Resolved Problems:    * No resolved hospital problems. *    Doing well.  Improving  Awaiting placement   Delayed discharge    Electronically signed by Irvin Dean MD

## 2022-05-04 NOTE — PLAN OF CARE
Problem: Pain  Goal: Verbalizes/displays adequate comfort level or baseline comfort level  5/4/2022 0405 by Krishan Boykin RN  Outcome: Progressing  5/3/2022 1948 by Sumit Vera RN  Outcome: Progressing     Problem: Skin/Tissue Integrity  Goal: Absence of new skin breakdown  Description: 1. Monitor for areas of redness and/or skin breakdown  2. Assess vascular access sites hourly  3. Every 4-6 hours minimum:  Change oxygen saturation probe site  4. Every 4-6 hours:  If on nasal continuous positive airway pressure, respiratory therapy assess nares and determine need for appliance change or resting period.   5/4/2022 0405 by Krishan Boykin RN  Outcome: Progressing  5/3/2022 1948 by Sumit Vera RN  Outcome: Progressing     Problem: Chronic Conditions and Co-morbidities  Goal: Patient's chronic conditions and co-morbidity symptoms are monitored and maintained or improved  5/4/2022 0405 by Krishan Boykin RN  Outcome: Progressing  5/3/2022 1948 by Sumit Vera RN  Outcome: Progressing     Problem: Safety - Adult  Goal: Free from fall injury  5/4/2022 0405 by Krishan Boykin RN  Outcome: Progressing  5/3/2022 1948 by Sumit Vera RN  Outcome: Progressing     Problem: ABCDS Injury Assessment  Goal: Absence of physical injury  5/4/2022 0405 by Krishan Boykin RN  Outcome: Progressing  5/3/2022 1948 by Sumit Vera RN  Outcome: Progressing

## 2022-05-04 NOTE — PROGRESS NOTES
Physical Therapy        Physical Therapy Cancel Note      DATE: 2022    NAME: Giancarlo Brown  MRN: [id number]   : 1955      Patient not seen this date for Physical Therapy due to:    Patient Declined: states can't get any rest. and pain all over, unrated. Pt encouraged, continued to decline any therapy, including bed exercises. ck .       Electronically signed by Goldy Kendrick PT on 1436 at 9:20 AM

## 2022-05-04 NOTE — CARE COORDINATION
Called Marie at [facility],  She informs me that [facility] needs to start a precert for admission to SNF from this hospital stay. Called Carrie from [facility] and she tells me that she did start a new precert. I provided her with Marie's number and asked her to please call the insurance company. She agreed to do so. Awaiting outcome    1022 Herve Tarik from [facility] calls to inform me that she has spoken to [facility] and that there referral is now under an expedited review and she should hear something back today.

## 2022-05-04 NOTE — PLAN OF CARE
Problem: Pain  Goal: Verbalizes/displays adequate comfort level or baseline comfort level  Outcome: Completed     Problem: Skin/Tissue Integrity  Goal: Absence of new skin breakdown  Description: 1. Monitor for areas of redness and/or skin breakdown  2. Assess vascular access sites hourly  3. Every 4-6 hours minimum:  Change oxygen saturation probe site  4. Every 4-6 hours:  If on nasal continuous positive airway pressure, respiratory therapy assess nares and determine need for appliance change or resting period.   Outcome: Completed     Problem: Chronic Conditions and Co-morbidities  Goal: Patient's chronic conditions and co-morbidity symptoms are monitored and maintained or improved  Outcome: Completed     Problem: Safety - Adult  Goal: Free from fall injury  Outcome: Completed     Problem: ABCDS Injury Assessment  Goal: Absence of physical injury  Outcome: Completed

## 2022-05-05 NOTE — CARE COORDINATION
Pt discharged to [facility], [phone]. Message left for Pancho Hunt at [facility] notifying her that pt was discharged from [address] in January and will need to go to the walk in clinic at [facility] for an assessment to get reestablished as a pt. Patients prior  at [facility] was [employer], [phone].

## 2022-05-06 ASSESSMENT — ENCOUNTER SYMPTOMS
SHORTNESS OF BREATH: 0
BACK PAIN: 0
COUGH: 0
NAUSEA: 1
VOMITING: 0
RHINORRHEA: 0
ABDOMINAL PAIN: 1
DIARRHEA: 0

## 2022-05-06 NOTE — ED PROVIDER NOTES
[facility] ED  Emergency Department Encounter  Emergency Medicine Resident     Pt Name: Diony Cox  MRN: [id number]  Adonaygfsurinder 1955  Date of evaluation: 5/6/22  PCP:  Yasmine Monahan MD    CHIEF COMPLAINT       Chief Complaint   Patient presents with    Abdominal Pain    Migraine       HISTORY OFPRESENT ILLNESS  (Location/Symptom, Timing/Onset, Context/Setting, Quality, Duration, Modifying Randall Boyd.)      Emi Daniels is a 69 y.o. female who presents with epigastric abdominal pain. Patient states this began overnight. She notes a history of a pancreatic mass that she is supposed to have further work-up on. Patient has nausea but has not been vomiting. She also has a mild headache. She denies any diarrhea or constipation. No fevers, chills, chest pain, shortness of breath, numbness, weakness, tingling. According to EMR, she was evaluated at another hospital last night was for a TIA with symptoms of slurred speech and facial droop. Symptoms resolved. She refused further work-up including MRI and requested to leave. Patient left AMA. It appears she left with her son according to notes. Patient states she has resided at [facility]. She is getting pain medications at that facility. She is requesting pain medications here. He is denying any other symptoms at this time.     PAST MEDICAL / SURGICAL / SOCIAL / FAMILY HISTORY      has a past medical history of Appendicitis, Cerebral artery occlusion with cerebral infarction ([address] CHF (congestive heart failure) (HCC), Chronic respiratory failure with hypoxia and hypercapnia (HCC), Chronic rhinitis, Cigarette smoker, COPD (chronic obstructive pulmonary disease) ([address] Depression, Dysphagia, Essential hypertension, GERD (gastroesophageal reflux disease), Heart failure, diastolic, with acute decompensation ([address] Hepatitis C, chronic ([address] History of smoking at least 1 pack per day for at least 30 years, Hyperlipidemia, Hypertension, Migraine, Moderate COPD (chronic obstructive pulmonary disease) (HCC), Multiple transfusions, Neurogenic dysphagia, Osteoarthritis, Pneumonia, Scoliosis, and Substance abuse ([facility] has a past surgical history that includes  section; LEEP (); Upper gastrointestinal endoscopy (); Gastric bypass surgery; Appendectomy (2017); laparoscopic appendectomy (N/A, 3/27/2017); Upper gastrointestinal endoscopy (2018); Upper gastrointestinal endoscopy (N/A, 2018); pr colonoscopy w/biopsy single/multiple (N/A, 11/10/2018); pr egd transoral biopsy single/multiple (N/A, 2018); hip surgery (Right, 2019); Total hip arthroplasty (Right, 2019); Medication Injection (Left, 3/25/2021); Upper gastrointestinal endoscopy (N/A, 2021); other surgical history (Left, 2021); Medication Injection (Left, 2021); and Medication Injection (Left, 2021).      Social History     Socioeconomic History    Marital status: Single     Spouse name: Not on file    Number of children: Not on file    Years of education: Not on file    Highest education level: Not on file   Occupational History     Employer: DISABLED   Tobacco Use    Smoking status: Current Some Day Smoker     Packs/day: 0.50     Years: 40.00     Pack years: 20.00     Types: Cigarettes     Start date: 5     Last attempt to quit: 2018     Years since quitting: 3.5    Smokeless tobacco: Never Used   Vaping Use    Vaping Use: Never used   Substance and Sexual Activity    Alcohol use: No     Alcohol/week: 0.0 standard drinks    Drug use: No     Types: IV, Cocaine     Comment: past history    Sexual activity: Not on file   Other Topics Concern    Not on file   Social History Narrative    Not on file     Social Determinants of Health     Financial Resource Strain:     Difficulty of Paying Living Expenses: Not on file   Food Insecurity:     Worried About Running Out of Food in the Last Year: Not on file    Ran Out of Food in the Last Year: Not on file   Transportation Needs:     Lack of Transportation (Medical): Not on file    Lack of Transportation (Non-Medical): Not on file   Physical Activity:     Days of Exercise per Week: Not on file    Minutes of Exercise per Session: Not on file   Stress:     Feeling of Stress : Not on file   Social Connections:     Frequency of Communication with Friends and Family: Not on file    Frequency of Social Gatherings with Friends and Family: Not on file    Attends [employer] Services: Not on file    Active Member of [address] [place] [address] or Organizations: Not on file    Attends Club or Organization Meetings: Not on file    Marital Status: Not on file   Intimate Partner Violence:     Fear of Current or Ex-Partner: Not on file    Emotionally Abused: Not on file    Physically Abused: Not on file    Sexually Abused: Not on file   Housing Stability:     Unable to Pay for Housing in the Last Year: Not on file    Number of [place] in the Last Year: Not on file    Unstable Housing in the Last Year: Not on file       Family History   Problem Relation Age of Onset    Breast Cancer Mother     Heart Disease Father         Allergies:  Aspirin and Emily Humphries Shai [cephalexin]    Home Medications:  Prior to Admission medications    Medication Sig Start Date End Date Taking? Authorizing Provider   oxyCODONE-acetaminophen (PERCOCET) 5-325 MG per tablet Take 1 tablet by mouth every 6 hours as needed for Pain for up to 3 days.  5/4/22 5/7/22  Demarcus Pickering MD   azithromycin (ZITHROMAX) 500 MG tablet Take 1 tablet by mouth daily for 3 days 5/3/22 5/6/22  Candelario Maher MD   predniSONE (DELTASONE) 20 MG tablet Take 2 tablets by mouth daily for 5 days 5/3/22 5/8/22  Candelario Maher MD   carvedilol (COREG) 12.5 MG tablet Take 1 tablet by mouth 2 times daily (with meals) 4/24/22 5/24/22  Honey Alvarez MD   gabapentin (NEURONTIN) 600 MG tablet Take 0.5 tablets by mouth 3 times daily for 7 days. 4/24/22 5/1/22  Rogelio Christiansen MD   mirtazapine (REMERON) 30 MG tablet Take 1 tablet by mouth nightly for 7 days 4/24/22 5/1/22  Rogelio Christiansen MD   dextromethorphan-guaiFENesin (ROBITUSSIN-DM)  MG/5ML syrup Take 10 mLs by mouth every 4 hours as needed for Cough 4/24/22   Rogelio Christiansen MD   losartan (COZAAR) 50 MG tablet Take 1 tablet by mouth at bedtime 4/24/22 5/24/22  Rogelio Christiansen MD   pantoprazole (PROTONIX) 40 MG tablet Take 1 tablet by mouth every morning (before breakfast) 4/24/22   Rogelio Christiansen MD   ondansetron (ZOFRAN-ODT) 8 MG TBDP disintegrating tablet  9/23/21   Historical Provider, MD   famotidine (PEPCID) 20 MG tablet  8/24/21   Historical Provider, MD   butalbital-acetaminophen-caffeine (FIORICET, ESGIC) -40 MG per tablet Take 1 tablet by mouth every 12 hours for 14 days 5/13/21 9/27/21  ISIDORO Novak MD   ferrous sulfate (FE TABS 325) 325 (65 Fe) MG EC tablet Take 1 tablet by mouth daily 5/14/21   ISIDORO Novak MD   acetaminophen (TYLENOL) 500 MG tablet Take 2 tablets by mouth every 6 hours as needed for Pain 5/7/21   Tara Leung,    mometasone-formoterol (DULERA) 100-5 MCG/ACT inhaler Inhale 2 puffs into the lungs 2 times daily 4/6/21   Jax Howell DO   atorvastatin (LIPITOR) 40 MG tablet Take 1 tablet by mouth nightly 4/6/21   Jax Howell DO   amitriptyline (ELAVIL) 10 MG tablet Take 1 tablet by mouth nightly 4/6/21   Jax Howell DO   albuterol sulfate HFA (PROVENTIL HFA) 108 (90 Base) MCG/ACT inhaler Inhale 1-2 puffs into the lungs once for 1 dose 4/6/21 9/27/21  Jax Howell DO   amLODIPine (NORVASC) 10 MG tablet Take 1 tablet by mouth daily 2/9/21 2/9/21  Emy Moya MD   [facility].  Devices [facility]) MISC 1 Device by Does not apply route once for 1 dose 8/5/19 6/6/21  Brookyln Mccray PA-C   ipratropium-albuterol (DUONEB) 0.5-2.5 (3) MG/3ML SOLN nebulizer solution Inhale 3 mLs into the lungs every 4 hours as needed for Shortness of Breath 1/11/19   Talat Leach MD   vitamin B-1 100 MG tablet Take 1 tablet by mouth daily 10/13/18   Juana Miranda MD   vitamin B-12 250 MCG tablet Take 1 tablet by mouth daily 10/13/18   Juana Miranda MD   Multiple Vitamins-Minerals (THERAPEUTIC MULTIVITAMIN-MINERALS) tablet Take 1 tablet by mouth daily 8/12/18   Manda Connor MD       REVIEW OFSYSTEMS    (2-9 systems for level 4, 10 or more for level 5)      Review of Systems   Constitutional: Negative for chills and fever. HENT: Negative for congestion and rhinorrhea. Eyes: Negative for visual disturbance. Respiratory: Negative for cough and shortness of breath. Cardiovascular: Negative for chest pain. Gastrointestinal: Positive for abdominal pain and nausea. Negative for diarrhea and vomiting. Genitourinary: Negative for dysuria. Musculoskeletal: Negative for back pain and neck pain. Skin: Negative for rash. Neurological: Positive for headaches. Negative for weakness and numbness. PHYSICAL EXAM   (up to 7 for level 4, 8 or more forlevel 5)      INITIAL VITALS:   ED Triage Vitals [04/30/22 1557]   BP Temp Temp Source Pulse Resp SpO2 Height Weight   (!) 159/87 99.2 °F (37.3 °C) Oral 89 24 94 % 4' 11\" (1.499 m) 100 lb (45.4 kg)       Physical Exam  Constitutional:       General: She is not in acute distress. Appearance: Normal appearance. She is normal weight. She is not ill-appearing, toxic-appearing or diaphoretic. HENT:      Head: Normocephalic and atraumatic. Nose: Nose normal.      Mouth/Throat:      Mouth: Mucous membranes are moist.      Pharynx: Oropharynx is clear. No oropharyngeal exudate or posterior oropharyngeal erythema. Eyes:      Extraocular Movements: Extraocular movements intact. Pupils: Pupils are equal, round, and reactive to light. Cardiovascular:      Rate and Rhythm: Normal rate and regular rhythm. Heart sounds: Normal heart sounds. No murmur heard.       Pulmonary: Effort: Pulmonary effort is normal. No respiratory distress. Breath sounds: Normal breath sounds. No wheezing, rhonchi or rales. Abdominal:      Comments: Abdomen is soft, tender to palpation epigastric region without rebound or guarding. Unable to palpate mass. No lower abdominal tenderness. Musculoskeletal:         General: No tenderness. Normal range of motion. Cervical back: Normal range of motion and neck supple. Right lower leg: No edema. Left lower leg: No edema. Skin:     General: Skin is warm and dry. Neurological:      General: No focal deficit present. Mental Status: She is alert and oriented to person, place, and time. Cranial Nerves: No cranial nerve deficit. Sensory: No sensory deficit. Motor: No weakness. Psychiatric:         Mood and Affect: Mood normal.         DIFFERENTIAL  DIAGNOSIS     PLAN (LABS / IMAGING / EKG):  Orders Placed This Encounter   Procedures    CT ABDOMEN PELVIS W IV CONTRAST Additional Contrast? None    CBC with Auto Differential    Comprehensive Metabolic Panel w/ Reflex to MG    Lipase       MEDICATIONS ORDERED:  Orders Placed This Encounter   Medications    ondansetron (ZOFRAN) injection 4 mg    morphine injection 4 mg    iopamidol (ISOVUE-370) 76 % injection 75 mL    oxyCODONE (ROXICODONE) immediate release tablet 5 mg       DDX: Bowel obstruction, gastritis, pancreatitis, gallbladder disease, viral illness, mesenteric ischemia, other    Initial MDM/Plan/ED COURSE:    69 y.o. female who presents with epigastric abdominal pain in the setting of known pancreatic mass. On exam, patient is in no acute distress and vitals are stable. She does have tenderness in the epigastric region, no masses palpated in no rebound or guarding. Remainder of exam and is unremarkable overall. She also had a mild headache, she is grossly neurologically intact. Patient requesting pain medication.   Given known history of pancreatic mass and what appears to be poor follow-up recently, will do CT of the abdomen and pelvis. Lab work also ordered. Patient given analgesics and antiemetics. We will continue to reassess. ED Course as of 05/06/22 1606   Sat Apr 30, 2022   1805 CT ABDOMEN PELVIS W IV CONTRAST Additional Contrast? None  IMPRESSION:  Stable pancreatic mass. No evidence of metastatic disease.     Severe degenerative changes left hip. [JS]   4[address] Tolerating p.o. We discussed discharge to [facility] as she continues to have pain management there. [JS]      ED Course User Index  [JS] Danyelle Neville, DO    :     DIAGNOSTIC RESULTS / EMERGENCYDEPARTMENT COURSE / MDM     LABS:  Labs Reviewed   CBC WITH AUTO DIFFERENTIAL - Abnormal; Notable for the following components:       Result Value    .3 (*)     MCH 33.9 (*)     Lymphocytes 23 (*)     Monocytes 16 (*)     All other components within normal limits   COMPREHENSIVE METABOLIC PANEL W/ REFLEX TO MG FOR LOW K - Abnormal; Notable for the following components:    Glucose 64 (*)     Alkaline Phosphatase 146 (*)     ALT 55 (*)     AST 79 (*)     Total Protein 8.4 (*)     All other components within normal limits   LIPASE           No results found. EKG      All EKG's are interpreted by the Emergency Department Physicianwho either signs or Co-signs this chart in the absence of a cardiologist.      PROCEDURES:  None    CONSULTS:  None    CRITICAL CARE:  Please see attending note    FINAL IMPRESSION      1. Abdominal pain, epigastric    2.  Pancreatic mass         DISPOSITION / PLAN     DISPOSITION Decision To Discharge 04/30/2022 06:37:53 PM      PATIENT REFERRED TO:  Eryn Pierson MD  [address]  [phone]    Schedule an appointment as soon as possible for a visit in 1 day      [facility] ED  [address] [place] [address]  [phone]    If symptoms worsen      DISCHARGE MEDICATIONS:  Discharge Medication List as of 4/30/2022  6:40 PM          Saranya Brown, DO  Emergency Medicine Resident    (Please note that portions of this note were completed with a voice recognition program.Efforts were made to edit the dictations but occasionally words are mis-transcribed.)       Saranya Brown,   Resident  05/06/22 8376

## 2022-05-09 NOTE — DISCHARGE SUMMARY
[facility]     Department of Internal Medicine - Staff Internal Medicine Teaching Service    INPATIENT DISCHARGE SUMMARY      Patient Identification:  Zenaida Blakely is a 69 y.o. female. :  1955  MRN: [id number]     Acct: [de-identified]   PCP: Jeny Brunner MD  Admit Date:  2022  Discharge date and time: 22  Attending Provider: No att. providers found                                     [address] Problem Lists:  Principal Problem:    Pneumonia  Active Problems:    HLD (hyperlipidemia)    H/O of spont intraparenchymal intracranial hemorrhage assoc w/ hypertension    GERD (gastroesophageal reflux disease)    History of osteonecrosis    Infection due to parainfluenza virus 4    Essential hypertension    COPD with acute exacerbation ([facility]    History of hepatitis C    Pancreatic mass    Homeless    Unable to care for self  Resolved Problems:    * No resolved hospital problems. *      HOSPITAL STAY     Brief Inpatient course:   Zenaida Blakely is a 69 y.o. female who was admitted for the management of Pneumonia, presented to the emergency department with a chief complaint of chest pain, SOB and abdominal pain. Of note she came to the ED 1 day PTA with similar sx and was sent home. She states the for the past week she has been having productive cough and difficulty breathing. Has been coughing up yellow sputum with coughing. Has a hx of COPD and states that she has been taking her inhalers. Denies any fevers/chills. Abdominal pain is chronic from a pancreatic mass that is stable that was confirmed with imaging this admission. In the ED patient was placed on 1.5L of O2 and given 40 prednisone. CXR revealed mild bilateral pulmonary opacities. Lipase is wnl. On exam patient's lung sounds ronchi. Patient is current smoker, no alcohol, former illegal drug user.  Patient was a resident in a facility but because she left AMA recently, she was denied readmission. Went to her sister's house but was told to not come back. patient was placed on abx and on steroids for COPD exacerbation. Patient was eventually DC back to her facility on 5/4/22      Procedures/ Significant Interventions:      No results found. Consults:     Consults:     Final Specialist Recommendations/Findings:   IP CONSULT TO INTERNAL MEDICINE  IP CONSULT TO CASE MANAGEMENT  IP CONSULT TO SOCIAL WORK  IP CONSULT TO IV TEAM      Any Hospital Acquired Infections: none    Discharge Functional Status:  stable    DISCHARGE PLAN     Disposition: long term care facility    Patient Instructions:   Discharge Medication List as of 5/4/2022  6:18 PM        START taking these medications    Details   azithromycin (ZITHROMAX) 500 MG tablet Take 1 tablet by mouth daily for 3 days, Disp-3 tablet, R-0Normal      predniSONE (DELTASONE) 20 MG tablet Take 2 tablets by mouth daily for 5 days, Disp-20 tablet, R-0Normal           CONTINUE these medications which have CHANGED    Details   oxyCODONE-acetaminophen (PERCOCET) 5-325 MG per tablet Take 1 tablet by mouth every 6 hours as needed for Pain for up to 3 days. , Disp-8 tablet, R-0Print           CONTINUE these medications which have NOT CHANGED    Details   carvedilol (COREG) 12.5 MG tablet Take 1 tablet by mouth 2 times daily (with meals), Disp-60 tablet, R-0Normal      gabapentin (NEURONTIN) 600 MG tablet Take 0.5 tablets by mouth 3 times daily for 7 days. , Disp-11 tablet, R-0Print      mirtazapine (REMERON) 30 MG tablet Take 1 tablet by mouth nightly for 7 days, Disp-7 tablet, R-0Print      dextromethorphan-guaiFENesin (ROBITUSSIN-DM)  MG/5ML syrup Take 10 mLs by mouth every 4 hours as needed for Cough, Disp-1 each, R-1Normal      losartan (COZAAR) 50 MG tablet Take 1 tablet by mouth at bedtime, Disp-30 tablet, R-0Normal      pantoprazole (PROTONIX) 40 MG tablet Take 1 tablet by mouth every morning (before breakfast), Disp-30 tablet, R-0Normal ondansetron (ZOFRAN-ODT) 8 MG TBDP disintegrating tablet Historical Med      famotidine (PEPCID) 20 MG tablet Historical Med      butalbital-acetaminophen-caffeine (FIORICET, ESGIC) -40 MG per tablet Take 1 tablet by mouth every 12 hours for 14 days, Disp-28 tablet, R-0Normal      ferrous sulfate (FE TABS 325) 325 (65 Fe) MG EC tablet Take 1 tablet by mouth daily, Disp-90 tablet, R-3Normal      acetaminophen (TYLENOL) 500 MG tablet Take 2 tablets by mouth every 6 hours as needed for Pain, Disp-40 tablet, R-0Print      mometasone-formoterol (DULERA) 100-5 MCG/ACT inhaler Inhale 2 puffs into the lungs 2 times daily, Disp-1 Inhaler, R-1Normal      atorvastatin (LIPITOR) 40 MG tablet Take 1 tablet by mouth nightly, Disp-30 tablet, R-3Normal      amitriptyline (ELAVIL) 10 MG tablet Take 1 tablet by mouth nightly, Disp-30 tablet, R-3Normal      albuterol sulfate HFA (PROVENTIL HFA) 108 (90 Base) MCG/ACT inhaler Inhale 1-2 puffs into the lungs once for 1 dose, Disp-1 Inhaler, R-0Normal      Misc.  Devices [facility]) MISC ONCE Starting Mon 8/5/2019, Disp-1 each, R-0, Print      ipratropium-albuterol (DUONEB) 0.5-2.5 (3) MG/3ML SOLN nebulizer solution Inhale 3 mLs into the lungs every 4 hours as needed for Shortness of Breath, Disp-360 mL, R-5Normal      vitamin B-1 100 MG tablet Take 1 tablet by mouth daily, Disp-30 tablet, R-3Normal      vitamin B-12 250 MCG tablet Take 1 tablet by mouth daily, Disp-30 tablet, R-3Normal      Multiple Vitamins-Minerals (THERAPEUTIC MULTIVITAMIN-MINERALS) tablet Take 1 tablet by mouth daily, Disp-30 tablet, R-3Normal             Activity: activity as tolerated    Diet: regular diet    Follow-up:    Raffy Hargrove MD  [address] [facility]  [phone]    Schedule an appointment as soon as possible for a visit        Patient Instructions:   Keep taking azithromycin and prednisone  Keep taking all other medications regularly  Follow up with PCP  If symptoms persist or worsen would recommend coming back to the ER        Ramon Gama MD  Internal Medicine Resident  [address]  5/9/2022 8:46 AM    Attending Physician Statement  I have discussed the care of Emi Daniels and I have examined the patient myselft and taken ros and hpi , including pertinent history and exam findings,  with the resident. I have reviewed the key elements of all parts of the encounter with the resident. I agree with the assessment, plan and orders as documented by the resident. I spent approx 35 mins in direct patient care as above and discussing discharge with patient, reviewing medications and counseling for discharge .     Electronically signed by Anjali Santos MD

## 2022-07-14 NOTE — PROGRESS NOTES
Pt upset and does not want to return to bed-pt refused IV at this time Sister of patient Scarlet requesting to cancel patient's surgery scheduled for 7/19. Patients appointment with primary for clearance is scheduled for 8/1. Please call sister and discuss

## 2023-06-21 NOTE — PROGRESS NOTES
Physical Therapy    Facility/Department: [facility]  Initial Assessment    NAME: Emi Daniels  : 1955  MRN: [id number]    Date of Service: 2021    Discharge Recommendations:  Patient would benefit from continued therapy after discharge   PT Equipment Recommendations  Equipment Needed: Yes  Mobility Devices: Jyothi Estrin: Rolling    Assessment   Body structures, Functions, Activity limitations: Decreased functional mobility ; Decreased endurance;Decreased strength;Decreased safe awareness; Increased pain  Assessment: Patient was able to sit up and take a few steps toward head of bed. Reluctant to ambulate more than that. Patient describes her current situation of being homeless. Pain \"7\"/10 in back and head. Patient will need PT to regain functional independence. Prognosis: Good  Decision Making: Low Complexity  Clinical Presentation: stable  PT Education: Goals;Transfer Training;Functional Mobility Training;Plan of Care;General Safety;Gait Training;PT Role  REQUIRES PT FOLLOW UP: Yes  Activity Tolerance  Activity Tolerance: Patient limited by fatigue;Patient limited by pain       Patient Diagnosis(es): The primary encounter diagnosis was Right flank pain. Diagnoses of Acute gastritis without hemorrhage, unspecified gastritis type, Colitis, and Pancreatic mass were also pertinent to this visit.      has a past medical history of Appendicitis, Cerebral artery occlusion with cerebral infarction ([address] CHF (congestive heart failure) ([address] Chronic respiratory failure with hypoxia and hypercapnia (HCC), Chronic rhinitis, Cigarette smoker, COPD (chronic obstructive pulmonary disease) ([address] Depression, Dysphagia, Essential hypertension, GERD (gastroesophageal reflux disease), Heart failure, diastolic, with acute decompensation ([address] Hepatitis C, chronic ([address] History of smoking at least 1 pack per day for at least 30 years, Hyperlipidemia, Hypertension, Migraine, Moderate COPD (chronic obstructive pulmonary disease) ([facility] Multiple transfusions, Neurogenic dysphagia, Osteoarthritis, Pneumonia, Scoliosis, and Substance abuse ([facility] has a past surgical history that includes  section; LEEP (); Upper gastrointestinal endoscopy (); Gastric bypass surgery; Appendectomy (2017); laparoscopic appendectomy (N/A, 3/27/2017); Upper gastrointestinal endoscopy (2018); Upper gastrointestinal endoscopy (N/A, 2018); pr colonoscopy w/biopsy single/multiple (N/A, 11/10/2018); pr egd transoral biopsy single/multiple (N/A, 2018); hip surgery (Right, 2019); Total hip arthroplasty (Right, 2019); and Medication Injection (Left, 3/25/2021). Restrictions  Position Activity Restriction  Other position/activity restrictions: Up with assist  Vision/Hearing  Vision: Within Functional Limits  Hearing: Within functional limits     Subjective  General  Chart Reviewed: Yes  Patient assessed for rehabilitation services?: Yes  Family / Caregiver Present: No  Follows Commands: Within Functional Limits  Pain Screening  Patient Currently in Pain: Yes  Pain Assessment  Pain Assessment: 0-10  Pain Level: 7  Pain Type: Chronic pain  Pain Location: Back;Head  Vital Signs  Resp: 28(with exertion)  Patient Currently in Pain: Yes  Oxygen Therapy  SpO2: 96 %  O2 Device: None (Room air)       Orientation  Orientation  Overall Orientation Status: Within Functional Limits  Social/Functional History  Social/Functional History  Lives With: (She is recently homeless)  Home Equipment: Quad cane  Additional Comments: She has her large base quad cane  Cognition   Cognition  Overall Cognitive Status: WFL    Objective     Observation/Palpation  Observation: Incision right anterior thigh. L scar distal anterior thigh.        Strength RLE  Strength RLE: Exception  R Hip Flexion: 3/5  R Knee Extension: 3/5  R Ankle Dorsiflexion: 3/5  R Ankle Plantar flexion: 3/5  Strength LLE  Strength LLE: Exception  L Hip Flexion: 3/5  L Knee Extension: 3/5  L Ankle Dorsiflexion: 3/5  L Ankle Plantar Flexion: 3/5     Sensation  Overall Sensation Status: WFL  Bed mobility  Supine to Sit: Stand by assistance  Sit to Supine: Minimal assistance  Transfers  Sit to Stand: Contact guard assistance  Stand to sit: Contact guard assistance  Ambulation  Ambulation?: Yes  Ambulation 1  Surface: level tile  Device: Large Casey Talat  Assistance: Contact guard assistance  Distance: 3'  Comments: Mildly unsteady. Balance  Sitting - Static: Good  Sitting - Dynamic: Good  Standing - Static: Fair;-  Standing - Dynamic: Poor;+        Plan   Plan  Times per week: 5-6x/wk  Current Treatment Recommendations: Gait Training, Strengthening, Functional Mobility Training, Endurance Training, Home Exercise Program, Transfer Training, Safety Education & Training, Patient/Caregiver Education & Training  Safety Devices  Type of devices: All fall risk precautions in place, Left in bed, Call light within reach, Gait belt, Nurse notified    AM-PAC Score  AM-PAC Inpatient Mobility Raw Score : 20 (04/04/21 1145)  AM-PAC Inpatient T-Scale Score : 47.67 (04/04/21 1145)  Mobility Inpatient CMS 0-100% Score: 35.83 (04/04/21 1145)  Mobility Inpatient CMS G-Code Modifier : Thuan Servant (04/04/21 1145)          Goals  Short term goals  Time Frame for Short term goals: 14 visits  Short term goal 1: Sit to/from stand with SBA  Short term goal 2: Ambulate 48' with RW with SBA. Short term goal 3: Demonstrate standing LE HEP for use at home.   Patient Goals   Patient goals : Find a place to live       Therapy Time   Individual Concurrent Group Co-treatment   Time In 1         Time Out 1112         Minutes 17                 Anyi Franklin, PT None

## 2024-04-28 NOTE — FLOWSHEET NOTE
[x] [facility] [address]  [address]    [phone]  F: [phone]   [] [address]  [address]   [address]  P: [phone]  F: [phone]  [] [place]  [address]  P: [phone]  F: [phone]  [] [address]  [facility]   [address]   [place]: [phone]  F: [phone]   [] [facility]  [address]  [place]: [phone]   F: [phone]     Physical Therapy Cancel/No Show note    Date: 2019  Patient: Tigre Mota  : 1955  MRN: [id number]    Cancels/No Shows to date:     For today's appointment patient:      [x]  Cancelled For Initial Evaluation    [] Rescheduled appointment    [] No-show     Reason given by patient:    []  Patient ill    []  Conflicting appointment    [] No transportation      [] Conflict with work    [] No reason given    [] Weather related    [x] Other:      Comments: Pt said she is in too much pain. pt. states she will call us back when she is ready to sched.       [] Next appointment was confirmed    Electronically signed by: Gadiel Badillo, PT
Initial (On Arrival)
